# Patient Record
Sex: MALE | Race: WHITE | NOT HISPANIC OR LATINO | Employment: OTHER | URBAN - METROPOLITAN AREA
[De-identification: names, ages, dates, MRNs, and addresses within clinical notes are randomized per-mention and may not be internally consistent; named-entity substitution may affect disease eponyms.]

---

## 2017-01-03 ENCOUNTER — ALLSCRIPTS OFFICE VISIT (OUTPATIENT)
Dept: OTHER | Facility: OTHER | Age: 82
End: 2017-01-03

## 2017-04-02 ENCOUNTER — APPOINTMENT (EMERGENCY)
Dept: ULTRASOUND IMAGING | Facility: HOSPITAL | Age: 82
DRG: 728 | End: 2017-04-02
Payer: MEDICARE

## 2017-04-02 ENCOUNTER — HOSPITAL ENCOUNTER (INPATIENT)
Facility: HOSPITAL | Age: 82
LOS: 2 days | Discharge: HOME | DRG: 728 | End: 2017-04-04
Attending: EMERGENCY MEDICINE | Admitting: HOSPITALIST
Payer: MEDICARE

## 2017-04-02 ENCOUNTER — APPOINTMENT (EMERGENCY)
Dept: CT IMAGING | Facility: HOSPITAL | Age: 82
DRG: 728 | End: 2017-04-02
Payer: MEDICARE

## 2017-04-02 DIAGNOSIS — S32.040A COMPRESSION FRACTURE OF FOURTH LUMBAR VERTEBRA (HCC): ICD-10-CM

## 2017-04-02 DIAGNOSIS — N50.811 TESTICULAR PAIN, RIGHT: Primary | ICD-10-CM

## 2017-04-02 DIAGNOSIS — N49.2 CELLULITIS, SCROTUM: ICD-10-CM

## 2017-04-02 DIAGNOSIS — N43.3 HYDROCELE IN ADULT: ICD-10-CM

## 2017-04-02 DIAGNOSIS — N45.1 EPIDIDYMITIS: ICD-10-CM

## 2017-04-02 LAB
ANION GAP SERPL CALCULATED.3IONS-SCNC: 11 MMOL/L (ref 4–13)
BACTERIA UR QL AUTO: ABNORMAL /HPF
BASOPHILS # BLD AUTO: 0.02 THOUSANDS/ΜL (ref 0–0.1)
BASOPHILS NFR BLD AUTO: 0 % (ref 0–1)
BILIRUB UR QL STRIP: NEGATIVE
BUN SERPL-MCNC: 22 MG/DL (ref 5–25)
CALCIUM SERPL-MCNC: 8.5 MG/DL (ref 8.3–10.1)
CHLORIDE SERPL-SCNC: 103 MMOL/L (ref 100–108)
CLARITY UR: ABNORMAL
CLARITY, POC: CLEAR
CO2 SERPL-SCNC: 25 MMOL/L (ref 21–32)
COLOR UR: YELLOW
COLOR, POC: YELLOW
CREAT SERPL-MCNC: 1.34 MG/DL (ref 0.6–1.3)
EOSINOPHIL # BLD AUTO: 0.18 THOUSAND/ΜL (ref 0–0.61)
EOSINOPHIL NFR BLD AUTO: 2 % (ref 0–6)
ERYTHROCYTE [DISTWIDTH] IN BLOOD BY AUTOMATED COUNT: 14.7 % (ref 11.6–15.1)
EXT BILIRUBIN, UA: NEGATIVE
EXT BLOOD URINE: NORMAL
EXT GLUCOSE, UA: NEGATIVE
EXT KETONES: NEGATIVE
EXT NITRITE, UA: NEGATIVE
EXT PH, UA: 5
EXT PROTEIN, UA: NORMAL
EXT SPECIFIC GRAVITY, UA: 1
EXT UROBILINOGEN: NEGATIVE
GFR SERPL CREATININE-BSD FRML MDRD: 51.3 ML/MIN/1.73SQ M
GLUCOSE SERPL-MCNC: 136 MG/DL (ref 65–140)
GLUCOSE UR STRIP-MCNC: NEGATIVE MG/DL
HCT VFR BLD AUTO: 35.2 % (ref 36.5–49.3)
HGB BLD-MCNC: 11.4 G/DL (ref 12–17)
HGB UR QL STRIP.AUTO: ABNORMAL
KETONES UR STRIP-MCNC: NEGATIVE MG/DL
LEUKOCYTE ESTERASE UR QL STRIP: ABNORMAL
LYMPHOCYTES # BLD AUTO: 1.73 THOUSANDS/ΜL (ref 0.6–4.47)
LYMPHOCYTES NFR BLD AUTO: 21 % (ref 14–44)
MCH RBC QN AUTO: 28.9 PG (ref 26.8–34.3)
MCHC RBC AUTO-ENTMCNC: 32.4 G/DL (ref 31.4–37.4)
MCV RBC AUTO: 89 FL (ref 82–98)
MONOCYTES # BLD AUTO: 0.96 THOUSAND/ΜL (ref 0.17–1.22)
MONOCYTES NFR BLD AUTO: 12 % (ref 4–12)
NEUTROPHILS # BLD AUTO: 5.45 THOUSANDS/ΜL (ref 1.85–7.62)
NEUTS SEG NFR BLD AUTO: 65 % (ref 43–75)
NITRITE UR QL STRIP: NEGATIVE
NON-SQ EPI CELLS URNS QL MICRO: ABNORMAL /HPF
PH UR STRIP.AUTO: 5 [PH] (ref 4.5–8)
PLATELET # BLD AUTO: 177 THOUSANDS/UL (ref 149–390)
PMV BLD AUTO: 10.1 FL (ref 8.9–12.7)
POTASSIUM SERPL-SCNC: 3.9 MMOL/L (ref 3.5–5.3)
PROT UR STRIP-MCNC: NEGATIVE MG/DL
RBC # BLD AUTO: 3.95 MILLION/UL (ref 3.88–5.62)
RBC #/AREA URNS AUTO: ABNORMAL /HPF
SODIUM SERPL-SCNC: 139 MMOL/L (ref 136–145)
SP GR UR STRIP.AUTO: 1.01 (ref 1–1.03)
UROBILINOGEN UR QL STRIP.AUTO: 0.2 E.U./DL
WBC # BLD AUTO: 8.34 THOUSAND/UL (ref 4.31–10.16)
WBC # BLD EST: NEGATIVE 10*3/UL
WBC #/AREA URNS AUTO: ABNORMAL /HPF

## 2017-04-02 PROCEDURE — 76870 US EXAM SCROTUM: CPT

## 2017-04-02 PROCEDURE — 74177 CT ABD & PELVIS W/CONTRAST: CPT

## 2017-04-02 PROCEDURE — 81001 URINALYSIS AUTO W/SCOPE: CPT | Performed by: HOSPITALIST

## 2017-04-02 PROCEDURE — 36415 COLL VENOUS BLD VENIPUNCTURE: CPT | Performed by: EMERGENCY MEDICINE

## 2017-04-02 PROCEDURE — 87086 URINE CULTURE/COLONY COUNT: CPT | Performed by: HOSPITALIST

## 2017-04-02 PROCEDURE — 85025 COMPLETE CBC W/AUTO DIFF WBC: CPT | Performed by: EMERGENCY MEDICINE

## 2017-04-02 PROCEDURE — 81002 URINALYSIS NONAUTO W/O SCOPE: CPT | Performed by: EMERGENCY MEDICINE

## 2017-04-02 PROCEDURE — 99285 EMERGENCY DEPT VISIT HI MDM: CPT

## 2017-04-02 PROCEDURE — 80048 BASIC METABOLIC PNL TOTAL CA: CPT | Performed by: EMERGENCY MEDICINE

## 2017-04-02 PROCEDURE — 96374 THER/PROPH/DIAG INJ IV PUSH: CPT

## 2017-04-02 PROCEDURE — 87040 BLOOD CULTURE FOR BACTERIA: CPT | Performed by: EMERGENCY MEDICINE

## 2017-04-02 RX ORDER — WARFARIN SODIUM 6 MG/1
6 TABLET ORAL
Status: DISCONTINUED | OUTPATIENT
Start: 2017-04-03 | End: 2017-04-04 | Stop reason: HOSPADM

## 2017-04-02 RX ORDER — ATORVASTATIN CALCIUM 40 MG/1
40 TABLET, FILM COATED ORAL
Status: DISCONTINUED | OUTPATIENT
Start: 2017-04-02 | End: 2017-04-04 | Stop reason: HOSPADM

## 2017-04-02 RX ORDER — POTASSIUM CHLORIDE 20 MEQ/1
20 TABLET, EXTENDED RELEASE ORAL DAILY
Status: DISCONTINUED | OUTPATIENT
Start: 2017-04-02 | End: 2017-04-04 | Stop reason: HOSPADM

## 2017-04-02 RX ORDER — DOXYCYCLINE HYCLATE 100 MG/1
100 CAPSULE ORAL 2 TIMES DAILY
Qty: 20 CAPSULE | Refills: 0 | Status: SHIPPED | OUTPATIENT
Start: 2017-04-02 | End: 2017-04-04 | Stop reason: HOSPADM

## 2017-04-02 RX ORDER — WARFARIN SODIUM 4 MG/1
4 TABLET ORAL
Status: DISCONTINUED | OUTPATIENT
Start: 2017-04-05 | End: 2017-04-04 | Stop reason: HOSPADM

## 2017-04-02 RX ORDER — DOXYCYCLINE HYCLATE 100 MG/1
100 CAPSULE ORAL ONCE
Status: COMPLETED | OUTPATIENT
Start: 2017-04-02 | End: 2017-04-02

## 2017-04-02 RX ORDER — PANTOPRAZOLE SODIUM 20 MG/1
20 TABLET, DELAYED RELEASE ORAL
Status: DISCONTINUED | OUTPATIENT
Start: 2017-04-03 | End: 2017-04-04 | Stop reason: HOSPADM

## 2017-04-02 RX ORDER — FENTANYL CITRATE 50 UG/ML
100 INJECTION, SOLUTION INTRAMUSCULAR; INTRAVENOUS ONCE
Status: COMPLETED | OUTPATIENT
Start: 2017-04-02 | End: 2017-04-02

## 2017-04-02 RX ORDER — AMOXICILLIN 250 MG/1
250 CAPSULE ORAL 2 TIMES DAILY
COMMUNITY
End: 2017-04-04 | Stop reason: HOSPADM

## 2017-04-02 RX ORDER — WARFARIN SODIUM 6 MG/1
6 TABLET ORAL
Status: COMPLETED | OUTPATIENT
Start: 2017-04-02 | End: 2017-04-02

## 2017-04-02 RX ORDER — DOXYCYCLINE HYCLATE 100 MG/1
100 CAPSULE ORAL EVERY 12 HOURS SCHEDULED
Status: DISCONTINUED | OUTPATIENT
Start: 2017-04-02 | End: 2017-04-04 | Stop reason: HOSPADM

## 2017-04-02 RX ORDER — OXYCODONE HYDROCHLORIDE AND ACETAMINOPHEN 5; 325 MG/1; MG/1
1 TABLET ORAL EVERY 6 HOURS PRN
Qty: 30 TABLET | Refills: 0 | Status: SHIPPED | OUTPATIENT
Start: 2017-04-02 | End: 2017-04-04 | Stop reason: HOSPADM

## 2017-04-02 RX ORDER — OXYCODONE HYDROCHLORIDE 5 MG/1
5 TABLET ORAL EVERY 6 HOURS PRN
Status: DISCONTINUED | OUTPATIENT
Start: 2017-04-02 | End: 2017-04-04 | Stop reason: HOSPADM

## 2017-04-02 RX ORDER — HYDROCHLOROTHIAZIDE 12.5 MG/1
12.5 TABLET ORAL DAILY
Status: DISCONTINUED | OUTPATIENT
Start: 2017-04-02 | End: 2017-04-04 | Stop reason: HOSPADM

## 2017-04-02 RX ORDER — LEVOFLOXACIN 500 MG/1
500 TABLET, FILM COATED ORAL DAILY
Qty: 10 TABLET | Refills: 0 | Status: SHIPPED | OUTPATIENT
Start: 2017-04-02 | End: 2017-04-02 | Stop reason: ALTCHOICE

## 2017-04-02 RX ORDER — ACETAMINOPHEN 325 MG/1
650 TABLET ORAL EVERY 6 HOURS PRN
Status: DISCONTINUED | OUTPATIENT
Start: 2017-04-02 | End: 2017-04-02 | Stop reason: SDUPTHER

## 2017-04-02 RX ORDER — OXYCODONE HYDROCHLORIDE 10 MG/1
10 TABLET ORAL EVERY 6 HOURS PRN
Status: DISCONTINUED | OUTPATIENT
Start: 2017-04-02 | End: 2017-04-04 | Stop reason: HOSPADM

## 2017-04-02 RX ORDER — FENTANYL CITRATE 50 UG/ML
INJECTION, SOLUTION INTRAMUSCULAR; INTRAVENOUS
Status: COMPLETED
Start: 2017-04-02 | End: 2017-04-02

## 2017-04-02 RX ORDER — TORSEMIDE 20 MG/1
20 TABLET ORAL DAILY
Status: DISCONTINUED | OUTPATIENT
Start: 2017-04-02 | End: 2017-04-04 | Stop reason: HOSPADM

## 2017-04-02 RX ORDER — SOTALOL HYDROCHLORIDE 80 MG/1
80 TABLET ORAL EVERY 12 HOURS SCHEDULED
Status: DISCONTINUED | OUTPATIENT
Start: 2017-04-02 | End: 2017-04-04 | Stop reason: HOSPADM

## 2017-04-02 RX ORDER — FENTANYL CITRATE 50 UG/ML
50 INJECTION, SOLUTION INTRAMUSCULAR; INTRAVENOUS ONCE
Status: COMPLETED | OUTPATIENT
Start: 2017-04-02 | End: 2017-04-02

## 2017-04-02 RX ORDER — ACETAMINOPHEN 325 MG/1
650 TABLET ORAL EVERY 6 HOURS PRN
Status: DISCONTINUED | OUTPATIENT
Start: 2017-04-02 | End: 2017-04-04 | Stop reason: HOSPADM

## 2017-04-02 RX ORDER — VALSARTAN 160 MG/1
160 TABLET ORAL DAILY
Status: DISCONTINUED | OUTPATIENT
Start: 2017-04-02 | End: 2017-04-04 | Stop reason: HOSPADM

## 2017-04-02 RX ADMIN — IOHEXOL 100 ML: 350 INJECTION, SOLUTION INTRAVENOUS at 06:09

## 2017-04-02 RX ADMIN — CEFAZOLIN SODIUM 1000 MG: 1 SOLUTION INTRAVENOUS at 09:21

## 2017-04-02 RX ADMIN — HYDROCHLOROTHIAZIDE 12.5 MG: 12.5 TABLET ORAL at 17:14

## 2017-04-02 RX ADMIN — OXYCODONE HYDROCHLORIDE 5 MG: 5 TABLET ORAL at 17:36

## 2017-04-02 RX ADMIN — CEFAZOLIN SODIUM 1000 MG: 1 SOLUTION INTRAVENOUS at 17:19

## 2017-04-02 RX ADMIN — FENTANYL CITRATE 50 MCG: 50 INJECTION, SOLUTION INTRAMUSCULAR; INTRAVENOUS at 15:20

## 2017-04-02 RX ADMIN — POTASSIUM CHLORIDE 20 MEQ: 1500 TABLET, EXTENDED RELEASE ORAL at 17:14

## 2017-04-02 RX ADMIN — ATORVASTATIN CALCIUM 40 MG: 40 TABLET, FILM COATED ORAL at 17:15

## 2017-04-02 RX ADMIN — FENTANYL CITRATE 50 MCG: 50 INJECTION INTRAMUSCULAR; INTRAVENOUS at 15:20

## 2017-04-02 RX ADMIN — WARFARIN SODIUM 6 MG: 6 TABLET ORAL at 17:15

## 2017-04-02 RX ADMIN — FENTANYL CITRATE 100 MCG: 50 INJECTION, SOLUTION INTRAMUSCULAR; INTRAVENOUS at 05:42

## 2017-04-02 RX ADMIN — TORSEMIDE 20 MG: 20 TABLET ORAL at 17:15

## 2017-04-02 RX ADMIN — SOTALOL HYDROCHLORIDE 80 MG: 80 TABLET ORAL at 17:15

## 2017-04-02 RX ADMIN — VALSARTAN 160 MG: 160 TABLET, FILM COATED ORAL at 17:15

## 2017-04-02 RX ADMIN — DOXYCYCLINE HYCLATE 100 MG: 100 CAPSULE, GELATIN COATED ORAL at 09:23

## 2017-04-02 RX ADMIN — DOXYCYCLINE HYCLATE 100 MG: 100 CAPSULE, GELATIN COATED ORAL at 21:23

## 2017-04-03 LAB
ALBUMIN SERPL BCP-MCNC: 3.3 G/DL (ref 3.5–5)
ALP SERPL-CCNC: 58 U/L (ref 46–116)
ALT SERPL W P-5'-P-CCNC: 20 U/L (ref 12–78)
ANION GAP SERPL CALCULATED.3IONS-SCNC: 12 MMOL/L (ref 4–13)
AST SERPL W P-5'-P-CCNC: 19 U/L (ref 5–45)
BACTERIA UR CULT: NORMAL
BILIRUB SERPL-MCNC: 1.2 MG/DL (ref 0.2–1)
BUN SERPL-MCNC: 23 MG/DL (ref 5–25)
CALCIUM SERPL-MCNC: 8.4 MG/DL (ref 8.3–10.1)
CHLORIDE SERPL-SCNC: 103 MMOL/L (ref 100–108)
CO2 SERPL-SCNC: 25 MMOL/L (ref 21–32)
CREAT SERPL-MCNC: 1.37 MG/DL (ref 0.6–1.3)
ERYTHROCYTE [DISTWIDTH] IN BLOOD BY AUTOMATED COUNT: 14.9 % (ref 11.6–15.1)
GFR SERPL CREATININE-BSD FRML MDRD: 50 ML/MIN/1.73SQ M
GLUCOSE SERPL-MCNC: 119 MG/DL (ref 65–140)
HCT VFR BLD AUTO: 33.7 % (ref 36.5–49.3)
HGB BLD-MCNC: 10.5 G/DL (ref 12–17)
INR PPP: 2.88 (ref 0.86–1.16)
MAGNESIUM SERPL-MCNC: 2 MG/DL (ref 1.6–2.6)
MCH RBC QN AUTO: 28.1 PG (ref 26.8–34.3)
MCHC RBC AUTO-ENTMCNC: 31.2 G/DL (ref 31.4–37.4)
MCV RBC AUTO: 90 FL (ref 82–98)
PLATELET # BLD AUTO: 199 THOUSANDS/UL (ref 149–390)
PMV BLD AUTO: 10.6 FL (ref 8.9–12.7)
POTASSIUM SERPL-SCNC: 4 MMOL/L (ref 3.5–5.3)
PROT SERPL-MCNC: 7 G/DL (ref 6.4–8.2)
PROTHROMBIN TIME: 29 SECONDS (ref 12–14.3)
RBC # BLD AUTO: 3.74 MILLION/UL (ref 3.88–5.62)
SODIUM SERPL-SCNC: 140 MMOL/L (ref 136–145)
WBC # BLD AUTO: 6.56 THOUSAND/UL (ref 4.31–10.16)

## 2017-04-03 PROCEDURE — 80053 COMPREHEN METABOLIC PANEL: CPT | Performed by: HOSPITALIST

## 2017-04-03 PROCEDURE — 85027 COMPLETE CBC AUTOMATED: CPT | Performed by: HOSPITALIST

## 2017-04-03 PROCEDURE — 85610 PROTHROMBIN TIME: CPT | Performed by: HOSPITALIST

## 2017-04-03 PROCEDURE — 83735 ASSAY OF MAGNESIUM: CPT | Performed by: HOSPITALIST

## 2017-04-03 RX ADMIN — HYDROCHLOROTHIAZIDE 12.5 MG: 12.5 TABLET ORAL at 09:54

## 2017-04-03 RX ADMIN — DOXYCYCLINE HYCLATE 100 MG: 100 CAPSULE, GELATIN COATED ORAL at 21:11

## 2017-04-03 RX ADMIN — CEFAZOLIN SODIUM 1000 MG: 1 SOLUTION INTRAVENOUS at 09:56

## 2017-04-03 RX ADMIN — CEFAZOLIN SODIUM 1000 MG: 1 SOLUTION INTRAVENOUS at 00:53

## 2017-04-03 RX ADMIN — SOTALOL HYDROCHLORIDE 80 MG: 80 TABLET ORAL at 09:54

## 2017-04-03 RX ADMIN — VALSARTAN 160 MG: 160 TABLET, FILM COATED ORAL at 09:55

## 2017-04-03 RX ADMIN — OXYCODONE HYDROCHLORIDE 10 MG: 10 TABLET ORAL at 16:20

## 2017-04-03 RX ADMIN — METFORMIN HYDROCHLORIDE 1000 MG: 500 TABLET, FILM COATED ORAL at 09:54

## 2017-04-03 RX ADMIN — WARFARIN SODIUM 6 MG: 6 TABLET ORAL at 16:25

## 2017-04-03 RX ADMIN — ATORVASTATIN CALCIUM 40 MG: 40 TABLET, FILM COATED ORAL at 16:19

## 2017-04-03 RX ADMIN — DOXYCYCLINE HYCLATE 100 MG: 100 CAPSULE, GELATIN COATED ORAL at 09:53

## 2017-04-03 RX ADMIN — TORSEMIDE 20 MG: 20 TABLET ORAL at 09:55

## 2017-04-03 RX ADMIN — CEFAZOLIN SODIUM 1000 MG: 1 SOLUTION INTRAVENOUS at 16:27

## 2017-04-03 RX ADMIN — SOTALOL HYDROCHLORIDE 80 MG: 80 TABLET ORAL at 21:11

## 2017-04-03 RX ADMIN — PANTOPRAZOLE SODIUM 20 MG: 20 TABLET, DELAYED RELEASE ORAL at 05:33

## 2017-04-03 RX ADMIN — POTASSIUM CHLORIDE 20 MEQ: 1500 TABLET, EXTENDED RELEASE ORAL at 09:55

## 2017-04-04 VITALS
TEMPERATURE: 98 F | SYSTOLIC BLOOD PRESSURE: 140 MMHG | RESPIRATION RATE: 20 BRPM | HEIGHT: 73 IN | DIASTOLIC BLOOD PRESSURE: 67 MMHG | BODY MASS INDEX: 41.75 KG/M2 | WEIGHT: 315 LBS | HEART RATE: 63 BPM | OXYGEN SATURATION: 95 %

## 2017-04-04 PROBLEM — E11.29 TYPE 2 DIABETES MELLITUS WITH RENAL COMPLICATION (HCC): Status: ACTIVE | Noted: 2017-04-04

## 2017-04-04 PROBLEM — Z79.01 CHRONIC ANTICOAGULATION: Status: ACTIVE | Noted: 2017-04-04

## 2017-04-04 PROBLEM — D64.9 ANEMIA: Status: ACTIVE | Noted: 2017-04-04

## 2017-04-04 PROBLEM — N43.3 HYDROCELE: Status: ACTIVE | Noted: 2017-04-04

## 2017-04-04 PROBLEM — E66.01 MORBID OBESITY (HCC): Status: ACTIVE | Noted: 2017-04-04

## 2017-04-04 LAB
BASOPHILS # BLD AUTO: 0.02 THOUSANDS/ΜL (ref 0–0.1)
BASOPHILS NFR BLD AUTO: 0 % (ref 0–1)
EOSINOPHIL # BLD AUTO: 0.4 THOUSAND/ΜL (ref 0–0.61)
EOSINOPHIL NFR BLD AUTO: 7 % (ref 0–6)
ERYTHROCYTE [DISTWIDTH] IN BLOOD BY AUTOMATED COUNT: 14.7 % (ref 11.6–15.1)
GLUCOSE SERPL-MCNC: 131 MG/DL (ref 65–140)
HCT VFR BLD AUTO: 33.8 % (ref 36.5–49.3)
HGB BLD-MCNC: 10.8 G/DL (ref 12–17)
LYMPHOCYTES # BLD AUTO: 1.55 THOUSANDS/ΜL (ref 0.6–4.47)
LYMPHOCYTES NFR BLD AUTO: 28 % (ref 14–44)
MCH RBC QN AUTO: 28.5 PG (ref 26.8–34.3)
MCHC RBC AUTO-ENTMCNC: 32 G/DL (ref 31.4–37.4)
MCV RBC AUTO: 89 FL (ref 82–98)
MONOCYTES # BLD AUTO: 0.83 THOUSAND/ΜL (ref 0.17–1.22)
MONOCYTES NFR BLD AUTO: 15 % (ref 4–12)
NEUTROPHILS # BLD AUTO: 2.67 THOUSANDS/ΜL (ref 1.85–7.62)
NEUTS SEG NFR BLD AUTO: 50 % (ref 43–75)
PLATELET # BLD AUTO: 208 THOUSANDS/UL (ref 149–390)
PMV BLD AUTO: 10.2 FL (ref 8.9–12.7)
RBC # BLD AUTO: 3.79 MILLION/UL (ref 3.88–5.62)
WBC # BLD AUTO: 5.47 THOUSAND/UL (ref 4.31–10.16)

## 2017-04-04 PROCEDURE — 82948 REAGENT STRIP/BLOOD GLUCOSE: CPT

## 2017-04-04 PROCEDURE — 85025 COMPLETE CBC W/AUTO DIFF WBC: CPT | Performed by: HOSPITALIST

## 2017-04-04 RX ORDER — CEPHALEXIN 500 MG/1
500 CAPSULE ORAL 2 TIMES DAILY
Qty: 22 CAPSULE | Refills: 0 | Status: SHIPPED | OUTPATIENT
Start: 2017-04-04 | End: 2017-04-04 | Stop reason: HOSPADM

## 2017-04-04 RX ORDER — DOXYCYCLINE HYCLATE 100 MG/1
100 CAPSULE ORAL EVERY 12 HOURS SCHEDULED
Qty: 22 CAPSULE | Refills: 0 | Status: SHIPPED | OUTPATIENT
Start: 2017-04-04 | End: 2017-04-15

## 2017-04-04 RX ADMIN — CEFAZOLIN SODIUM 1000 MG: 1 SOLUTION INTRAVENOUS at 01:50

## 2017-04-04 RX ADMIN — DOXYCYCLINE HYCLATE 100 MG: 100 CAPSULE, GELATIN COATED ORAL at 08:10

## 2017-04-04 RX ADMIN — POTASSIUM CHLORIDE 20 MEQ: 1500 TABLET, EXTENDED RELEASE ORAL at 08:10

## 2017-04-04 RX ADMIN — TORSEMIDE 20 MG: 20 TABLET ORAL at 08:10

## 2017-04-04 RX ADMIN — VALSARTAN 160 MG: 160 TABLET, FILM COATED ORAL at 08:10

## 2017-04-04 RX ADMIN — HYDROCHLOROTHIAZIDE 12.5 MG: 12.5 TABLET ORAL at 08:12

## 2017-04-04 RX ADMIN — PANTOPRAZOLE SODIUM 20 MG: 20 TABLET, DELAYED RELEASE ORAL at 05:38

## 2017-04-04 RX ADMIN — METFORMIN HYDROCHLORIDE 1000 MG: 500 TABLET, FILM COATED ORAL at 08:10

## 2017-04-04 RX ADMIN — CEFAZOLIN SODIUM 1000 MG: 1 SOLUTION INTRAVENOUS at 08:13

## 2017-04-04 RX ADMIN — SOTALOL HYDROCHLORIDE 80 MG: 80 TABLET ORAL at 08:10

## 2017-04-05 ENCOUNTER — TRANSCRIBE ORDERS (OUTPATIENT)
Dept: ADMINISTRATIVE | Facility: HOSPITAL | Age: 82
End: 2017-04-05

## 2017-04-05 DIAGNOSIS — N28.1 ACQUIRED CYST OF KIDNEY: Primary | ICD-10-CM

## 2017-04-07 LAB
BACTERIA BLD CULT: NORMAL
BACTERIA BLD CULT: NORMAL

## 2017-04-08 ENCOUNTER — HOSPITAL ENCOUNTER (OUTPATIENT)
Dept: ULTRASOUND IMAGING | Facility: HOSPITAL | Age: 82
Discharge: HOME | End: 2017-04-08
Payer: MEDICARE

## 2017-04-08 DIAGNOSIS — N28.1 ACQUIRED CYST OF KIDNEY: ICD-10-CM

## 2017-04-08 PROCEDURE — 51798 US URINE CAPACITY MEASURE: CPT

## 2017-04-17 DIAGNOSIS — N40.0 ENLARGED PROSTATE WITHOUT LOWER URINARY TRACT SYMPTOMS (LUTS): ICD-10-CM

## 2017-04-17 DIAGNOSIS — I48.0 PAROXYSMAL ATRIAL FIBRILLATION (HCC): ICD-10-CM

## 2017-04-17 DIAGNOSIS — N39.0 URINARY TRACT INFECTION: ICD-10-CM

## 2017-04-19 ENCOUNTER — ALLSCRIPTS OFFICE VISIT (OUTPATIENT)
Dept: OTHER | Facility: OTHER | Age: 82
End: 2017-04-19

## 2017-04-20 ENCOUNTER — ALLSCRIPTS OFFICE VISIT (OUTPATIENT)
Dept: OTHER | Facility: OTHER | Age: 82
End: 2017-04-20

## 2017-04-20 ENCOUNTER — APPOINTMENT (OUTPATIENT)
Dept: LAB | Facility: HOSPITAL | Age: 82
End: 2017-04-20
Attending: UROLOGY
Payer: MEDICARE

## 2017-04-20 DIAGNOSIS — N40.0 ENLARGED PROSTATE WITHOUT LOWER URINARY TRACT SYMPTOMS (LUTS): ICD-10-CM

## 2017-04-20 LAB
BILIRUB UR QL STRIP: NORMAL
CLARITY UR: NORMAL
COLOR UR: YELLOW
GLUCOSE (HISTORICAL): NORMAL
HGB UR QL STRIP.AUTO: NORMAL
KETONES UR STRIP-MCNC: NORMAL MG/DL
LEUKOCYTE ESTERASE UR QL STRIP: NORMAL
NITRITE UR QL STRIP: NORMAL
PH UR STRIP.AUTO: 6 [PH]
PROT UR STRIP-MCNC: NORMAL MG/DL
SP GR UR STRIP.AUTO: 1.01
UROBILINOGEN UR QL STRIP.AUTO: NORMAL

## 2017-04-20 PROCEDURE — 87086 URINE CULTURE/COLONY COUNT: CPT

## 2017-04-21 LAB — BACTERIA UR CULT: NORMAL

## 2017-04-24 ENCOUNTER — ALLSCRIPTS OFFICE VISIT (OUTPATIENT)
Dept: OTHER | Facility: OTHER | Age: 82
End: 2017-04-24

## 2017-04-26 LAB
LEFT EYE DIABETIC RETINOPATHY: NORMAL
RIGHT EYE DIABETIC RETINOPATHY: NORMAL

## 2017-05-18 ENCOUNTER — HOSPITAL ENCOUNTER (OUTPATIENT)
Dept: NON INVASIVE DIAGNOSTICS | Facility: CLINIC | Age: 82
Discharge: HOME | End: 2017-05-18
Payer: MEDICARE

## 2017-05-18 DIAGNOSIS — I48.0 PAROXYSMAL ATRIAL FIBRILLATION (HCC): ICD-10-CM

## 2017-05-18 PROCEDURE — 93306 TTE W/DOPPLER COMPLETE: CPT

## 2017-05-19 ENCOUNTER — APPOINTMENT (OUTPATIENT)
Dept: LAB | Age: 82
End: 2017-05-19
Payer: MEDICARE

## 2017-05-19 ENCOUNTER — TRANSCRIBE ORDERS (OUTPATIENT)
Dept: ADMINISTRATIVE | Age: 82
End: 2017-05-19

## 2017-05-19 DIAGNOSIS — R31.0 GROSS HEMATURIA: ICD-10-CM

## 2017-05-19 PROCEDURE — 87086 URINE CULTURE/COLONY COUNT: CPT

## 2017-05-19 PROCEDURE — 87077 CULTURE AEROBIC IDENTIFY: CPT

## 2017-05-19 PROCEDURE — 87186 SC STD MICRODIL/AGAR DIL: CPT

## 2017-05-21 LAB — BACTERIA UR CULT: NORMAL

## 2017-06-02 ENCOUNTER — ALLSCRIPTS OFFICE VISIT (OUTPATIENT)
Dept: OTHER | Facility: OTHER | Age: 82
End: 2017-06-02

## 2017-06-23 DIAGNOSIS — N39.0 URINARY TRACT INFECTION: ICD-10-CM

## 2017-06-24 ENCOUNTER — LAB CONVERSION - ENCOUNTER (OUTPATIENT)
Dept: OTHER | Facility: OTHER | Age: 82
End: 2017-06-24

## 2017-06-24 LAB
BILIRUB UR QL STRIP: NEGATIVE
COLOR UR: NORMAL
COMMENT (HISTORICAL): CLEAR
CULTURE RESULT (HISTORICAL): NORMAL
FECAL OCCULT BLOOD DIAGNOSTIC (HISTORICAL): NEGATIVE
GLUCOSE (HISTORICAL): NEGATIVE
KETONES UR STRIP-MCNC: NEGATIVE MG/DL
LEUKOCYTE ESTERASE UR QL STRIP: NEGATIVE
NITRITE UR QL STRIP: NEGATIVE
PH UR STRIP.AUTO: 5.5 [PH] (ref 5–8)
PROT UR STRIP-MCNC: NEGATIVE MG/DL
SP GR UR STRIP.AUTO: 1.02 (ref 1–1.03)

## 2017-07-06 ENCOUNTER — ALLSCRIPTS OFFICE VISIT (OUTPATIENT)
Dept: OTHER | Facility: OTHER | Age: 82
End: 2017-07-06

## 2017-07-06 LAB
CLARITY UR: NORMAL
COLOR UR: YELLOW
GLUCOSE (HISTORICAL): NORMAL
HGB UR QL STRIP.AUTO: NORMAL
KETONES UR STRIP-MCNC: NORMAL MG/DL
LEUKOCYTE ESTERASE UR QL STRIP: NORMAL
NITRITE UR QL STRIP: NORMAL
PH UR STRIP.AUTO: 6 [PH]
PROT UR STRIP-MCNC: NORMAL MG/DL
SP GR UR STRIP.AUTO: 1.02

## 2017-10-09 ENCOUNTER — ALLSCRIPTS OFFICE VISIT (OUTPATIENT)
Dept: OTHER | Facility: OTHER | Age: 82
End: 2017-10-09

## 2017-10-18 ENCOUNTER — ALLSCRIPTS OFFICE VISIT (OUTPATIENT)
Dept: OTHER | Facility: OTHER | Age: 82
End: 2017-10-18

## 2017-10-19 NOTE — PROGRESS NOTES
Assessment  Assessed    1  Aortic Valve Replacement   2  Diastolic congestive heart failure (428 30,428 0) (I50 30)   3  Dyslipidemia (272 4) (E78 5)   4  Edema (782 3) (R60 9)    Plan  Diastolic congestive heart failure, Edema, Hypertension, ICD (implantable  cardioverter-defibrillator) in place    · Torsemide 20 MG Oral Tablet (Demadex); Take 1 tablet twice daily   Rx By: Urbano Perkins; Dispense: 90 Days ; #:180 Tablet; Refill: 3;For: Diastolic congestive heart failure, Edema, Hypertension, ICD (implantable cardioverter-defibrillator) in place; ROBERT = N; Sent To: [place] [id number]  Edema    · Follow-up visit in 2 months Evaluation and Treatment  Follow-up  Status: Hold For -  Scheduling  Requested for: [id number]   Ordered; For: Edema; Ordered By: Urbano Perkins Performed:  Due: 58XUT8765  Paroxysmal atrial fibrillation    · EKG/ECG- POC; Status:Complete;   Done: 05MPB8631   Perform: In Office; Due:11Xdi0136; Last Updated By:Dontae Richmond; 10/18/2017 4:13:31 PM;Ordered; For:Paroxysmal atrial fibrillation; Ordered By:Miguel Angel Cutler; Discussion/Summary  Cardiology Discussion Summary Free Text Note Form [facility]:   81year old man with morbid obesity, hypertension, recent aortic valve replacement for endocarditis who returns for follow up  Has an increase in dyspnea and edema since last visit  Echocardiogram demonstrated normal left ventricular systolic function with normal AVR  s/p aortic valve replacement for endocarditis - doing well  Hypertension - elevated  s/p ICD for ventricular tachycardiaDVT - on warfarinParoxysmal atrial fibrillation - on Sotalol  In normal sinus rhythm  Progressive dyspnea/edema - appears to be somewhat volume overloaded  Increase Demadex to 20mg 2x/day at noon and 4 PM Continue remainder of medications  Follow up in 2 months  Follow up in 6 months  Chief Complaint  Chief Complaint Free Text Note Form: Pt here for a 6 month f/u   Pt complains of sob and swelling of his right leg    Chief Complaint Chronic Condition [facility]: Patient is here today for follow up of chronic conditions described in HPI  History of Present Illness  Cardiology HPI Free Text Note Form [facility]: Mr Jenna Joshi is an 81year old man with morbid obesity, hypertension, recent aortic valve replacement for endocarditis who returns for follow up  Has an increase in dyspnea and edema since last visit  Echocardiogram demonstrated normal left ventricular systolic function with normal AVR  Review of Systems  Cardiology Male ROS:     Cardiac: has swelling in the     Skin: No complaints of nonhealing sores or skin rash  Genitourinary: No complaints of recurrent urinary tract infections, frequent urination at night, difficult urination, blood in urine, kidney stones, loss of bladder control, no kidney or prostate problems, no erectile dysfunction  Psychological: No complaints of feeling depressed, anxiety, panic attacks, or difficulty concentrating  General: lack of energy/fatigue  Respiratory: No complaints of shortness of breath, cough with sputum, or wheezing  HEENT: No complaints of serious problems, hearing problems, nose problems, throat problems, or snoring  Gastrointestinal: No complaints of liver problems, nausea, vomiting, heartburn, constipation, bloody stools, diarrhea, problems swallowing, adbominal pain, or rectal bleeding  Hematologic: bleeding disorders   Neurological: No complaints of numbness, tingling, dizziness, weakness, seizures, headaches, syncope or fainting, AM fatigue, daytime sleepiness, no witnessed apnea episodes  Musculoskeletal: No complaints of arthritis, back pain, or painfull swelling  Active Problems  Problems    1  Aortic stenosis (424 1) (I35 0)   2  Aortic Valve Replacement   3  BPH (benign prostatic hyperplasia) (600 00) (N40 0)   4  BPH (benign prostatic hyperplasia) (600 00) (N40 0)   5  Cardiomegaly (429 3) (I51 7)   6   Chronic bilateral low back pain without sciatica (724 2,338 29) (M54 5,G89 29)   7  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 (459 31) (I87 311)   8  Compression fracture of L4 lumbar vertebra (805 4) (S32 040A)   9  Diabetes Mellitus (250 00)   10  Diastolic congestive heart failure (428 30,428 0) (I50 30)   11  Dyslipidemia (272 4) (E78 5)   12  Edema (782 3) (R60 9)   13  Endocarditis, valve unspecified (424 90) (I38)   14  Epididymitis (604 90) (N45 1)   15  Hematuria, gross (599 71) (R31 0)   16  Hydrocele (603 9) (N43 3)   17  Hypertension (401 9) (I10)   18  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)   19  Left knee pain (719 46) (M25 562)   20  Obesity, unspecified obesity severity, unspecified obesity type (278 00) (E66 9)   21  Osteoarthritis (715 90) (M19 90)   22  Paroxysmal atrial fibrillation (427 31) (I48 0)   23  Pseudogout (275 49)   24  Renal cyst (753 10) (N28 1)   25  Urinary tract infection, acute (599 0) (N39 0)   26  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)   27  Venous insufficiency (chronic) (peripheral) (459 81) (I87 2)   28  Ventricular tachycardia (427 1) (I47 2)    Past Medical History  Problems    1  History of Acute deep vein thrombosis of lower limb, unspecified laterality   2  History of Diabetes Mellitus (250 00)   3  History of Dyslipidemia (272 4) (E78 5)   4  History of hypertension (V12 59) (Z86 79)   5  History of Morbid or severe obesity due to excess calories (278 01) (E66 01)   6  History of Open wound of foot (892 0) (S91 309A)   7  History of Pulmonary Embolism (V12 55)  Active Problems And Past Medical History Reviewed: The active problems and past medical history were reviewed and updated today  Surgical History  Problems    1  History of Aortic Valve Replacement   2  Aortic Valve Replacement   3  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   4  History of Varicose Vein Ligation  Surgical History Reviewed:    The surgical history was reviewed and updated today  Family History  Daughter    1  Family history of Arthritis  Family History    2  Family history of Arthritis  Family History Reviewed: The family history was reviewed and updated today  Social History  Problems    · Denied: History of Alcohol Use (History)   · Marital History - Currently    · Never A Smoker   · No drug use  Social History Reviewed: The social history was reviewed and updated today  The social history was reviewed and is unchanged  Current Meds   1  Atorvastatin Calcium 40 MG Oral Tablet; Take 1 tablet daily; Therapy: (Recorded:16Apr2015) to Recorded   2  Demadex 20 MG Oral Tablet; TAKE 1 TABLET ONCE DAILY; Therapy: (Recorded:24Apr2014) to Recorded   3  Diovan -12 5 MG Oral Tablet; TAKE 1 TABLET ONCE DAILY; Therapy: 15Apr2014 to Recorded   4  Lidocaine HCl (Local Anesth ) 4 % SOLN; Apply prn to wound(s) prior to debridment for   pain control; Therapy: (Dayne Winter) to Recorded   5  Meloxicam 15 MG Oral Tablet; Therapy: (Recorded:93Bym6695) to Recorded   6  MetFORMIN HCl ER (MOD) 1000 MG Oral Tablet Extended Release 24 Hour; Take one   tablet daily; Therapy: (Recorded:09Apr2014) to Recorded   7  Multivitamins TABS; TAKE 1 TABLET DAILY; Therapy: (Recorded:20Apr2016) to Recorded   8  Nitrofurantoin Monohyd Macro 100 MG Oral Capsule; TAKE 1 CAPSULE EVERY 12   HOURS DAILY; Therapy: 09LDO8384 to (Evaluate:01Jun2017)  Requested for: 09UUP8410; Last   Rx:[id number] Ordered   9  Omeprazole 20 MG Oral Tablet Delayed Release; Take 1 tablet daily; Therapy: (Recorded:19Mar2014) to Recorded   10  Potassium Chloride Jesusita ER 20 MEQ Oral Tablet Extended Release; Take 1 tablet daily; Therapy: (Recorded:24Apr2014) to Recorded   11  Sotalol HCl - 80 MG Oral Tablet; TAKE 1 TABLET EVERY 12 HOURS DAILY; Therapy: (Recorded:09Apr2014) to Recorded   12  Tamsulosin HCl - 0 4 MG Oral Capsule;     Therapy: (Recorded:60Ula3243) to Recorded 13  Tylenol 325 MG Oral Tablet; Therapy: (Recorded:80Vex5178) to Recorded   14  Warfarin Sodium 7 5 MG Oral Tablet; TAKE 1 TABLET DAILY; Therapy: (Recorded:19Mar2014) to Recorded    Allergies  Medication    1  No Known Drug Allergies    Vitals  Vital Signs    Recorded: 08CHH8560 04:08PM   Heart Rate 62   Systolic 371, RUE, Sitting   Diastolic 84, RUE, Sitting   Height 6 ft 1 in   Weight 348 lb 7 oz   BMI Calculated 45 97   BSA Calculated 2 72     Physical Exam    Constitutional   General appearance: Abnormal  -- elderly obese male  Ears, Nose, Mouth, and Throat - External inspection of ears and nose: Normal without deformities or discharge  Neck   Neck and thyroid: Normal, supple, trachea midline, no thyromegaly  Pulmonary   Respiratory effort: No increased work of breathing or signs of respiratory distress  Auscultation of lungs: Clear to auscultation, no rales, no rhonchi, no wheezing, good air movement  Cardiovascular   Auscultation of heart: Abnormal   A grade 2 systolic murmur was heard at the RUSB  Examination of extremities for edema and/or varicosities: Abnormal   bilateral ankle 1+ pitting edema  Chest - Chest: Normal    Abdomen   Abdomen: Abnormal  -- obese  Musculoskeletal Gait and station: Normal gait  Skin - Skin and subcutaneous tissue: Normal without rashes or lesions  Skin is warm and well perfused, normal turgor  Neurologic - Speech: Normal     Psychiatric - Orientation to person, place, and time: Normal -- Mood and affect: Normal       Results/Data  ECG Report:   Rhythm and rate:  ventricular rate is 62 beats per minute  -- normal sinus rhythm  ME Interval: first degree heart block     QRS: left bundle branch block      Future Appointments    Date/Time Provider Specialty Site   07/10/2018 08:00 AM Cardiology, [address] [facility]   01/10/2018 08:00 AM Cardiology, Device Remote  [address]   01/11/2018 09:00 AM Corrinne Ogle, Colt Russo, [facility] Urology [facility]     Signatures   Electronically signed by : MAXIME Crowell ; Oct 18 2017  4:26PM EST                       (Author)

## 2017-12-05 ENCOUNTER — APPOINTMENT (OUTPATIENT)
Dept: WOUND CARE | Facility: SKILLED NURSING FACILITY | Age: 82
End: 2017-12-05
Payer: MEDICARE

## 2017-12-05 PROCEDURE — 11042 DBRDMT SUBQ TIS 1ST 20SQCM/<: CPT

## 2017-12-05 PROCEDURE — 99213 OFFICE O/P EST LOW 20 MIN: CPT

## 2017-12-14 ENCOUNTER — ALLSCRIPTS OFFICE VISIT (OUTPATIENT)
Dept: OTHER | Facility: OTHER | Age: 82
End: 2017-12-14

## 2017-12-15 NOTE — PROGRESS NOTES
Assessment  Assessed    1  Aortic Valve Replacement   2  Diastolic congestive heart failure (428 30,428 0) (I50 30)   3  Dyslipidemia (272 4) (E78 5)   4  Edema (782 3) (R60 9)   5  Hypertension (401 9) (I10)   6  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)    Plan  Hypertension    · Valsartan-Hydrochlorothiazide 320-25 MG Oral Tablet (Diovan HCT); TAKE 1TABLET BY MOUTH EVERY DAY   Rx By: Cherri Kimball; Dispense: 90 Days ; #:90 Tablet; Refill: 3;For: Hypertension; ROBERT = N; [facility]  [id number]  ICD (implantable cardioverter-defibrillator) in place    · Follow-up visit in 4 Months Evaluation and Treatment  Follow-up  Status: Hold For -Scheduling  Requested for: [id number]   Ordered; For: ICD (implantable cardioverter-defibrillator) in place; Ordered By: Cherri Kimball Performed:  Due: 04AYB0555    Discussion/Summary  Cardiology Discussion Summary Free Text Note Form [facility]:   81year old man with morbid obesity, hypertension, recent aortic valve replacement for endocarditis who returns for follow up  Dyspnea is stable  Echocardiogram demonstrated normal left ventricular systolic function with normal AVR  Takes Torsemide daily because urinates too much  Impression:1  s/p aortic valve replacement for endocarditis - doing well  2  Hypertension - elevated  3  s/p ICD for ventricular tachycardia4  DVT - on warfarin5  Paroxysmal atrial fibrillation - on Sotalol  In normal sinus rhythm  6  Progressive dyspnea/edema - appears to be somewhat volume overloaded  Recommendations:1  Change Diovan HCT to 320-25mg daily  2  Continue remainder of medications  3  Follow up in 4 months  Chief Complaint  Chief Complaint Free Text Note Form: patient at the office for 2 months follow up, patient complain of shortness of breath when walk and right leg swelling almost daily  Chief Complaint Chronic Condition [facility]: Patient is here today for follow up of chronic conditions described in HPI  History of Present Illness  Cardiology [facility] [place] Free Text Note Form [facility]: Mr  Lelia Barry is an 81year old man with morbid obesity, hypertension, recent aortic valve replacement for endocarditis who returns for follow up  Dyspnea is stable  Echocardiogram demonstrated normal left ventricular systolic function with normal AVR  Takes Torsemide daily because urinates too much  Review of Systems  Cardiology Male ROS:    Cardiac: has swelling in the    Skin: No complaints of nonhealing sores or skin rash  Genitourinary: No complaints of recurrent urinary tract infections, frequent urination at night, difficult urination, blood in urine, kidney stones, loss of bladder control, no kidney or prostate problems, no erectile dysfunction  Psychological: No complaints of feeling depressed, anxiety, panic attacks, or difficulty concentrating  General: lack of energy/fatigue  Respiratory: No complaints of shortness of breath, cough with sputum, or wheezing  HEENT: No complaints of serious problems, hearing problems, nose problems, throat problems, or snoring  Gastrointestinal: No complaints of liver problems, nausea, vomiting, heartburn, constipation, bloody stools, diarrhea, problems swallowing, adbominal pain, or rectal bleeding  Hematologic: bleeding disorders  Neurological: No complaints of numbness, tingling, dizziness, weakness, seizures, headaches, syncope or fainting, AM fatigue, daytime sleepiness, no witnessed apnea episodes  Musculoskeletal: No complaints of arthritis, back pain, or painfull swelling  Active Problems  Problems    1  Aortic stenosis (424 1) (I35 0)   2  Aortic Valve Replacement   3  BPH (benign prostatic hyperplasia) (600 00) (N40 0)   4  BPH (benign prostatic hyperplasia) (600 00) (N40 0)   5  Cardiomegaly (429 3) (I51 7)   6  Chronic bilateral low back pain without sciatica (724 2,338 29) (M54 5,G89 29)   7   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 (459 31) (I87 311)   8  Compression fracture of L4 lumbar vertebra (805 4) (S32 040A)   9  Diabetes Mellitus (250 00)   10  Diastolic congestive heart failure (428 30,428 0) (I50 30)   11  Dyslipidemia (272 4) (E78 5)   12  Edema (782 3) (R60 9)   13  Endocarditis, valve unspecified (424 90) (I38)   14  Epididymitis (604 90) (N45 1)   15  Hematuria, gross (599 71) (R31 0)   16  Hydrocele (603 9) (N43 3)   17  Hypertension (401 9) (I10)   18  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)   19  Left knee pain (719 46) (M25 562)   20  Obesity, unspecified obesity severity, unspecified obesity type (278 00) (E66 9)   21  Osteoarthritis (715 90) (M19 90)   22  Paroxysmal atrial fibrillation (427 31) (I48 0)   23  Pseudogout (275 49)   24  Renal cyst (753 10) (N28 1)   25  Urinary tract infection, acute (599 0) (N39 0)   26  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)   27  Venous insufficiency (chronic) (peripheral) (459 81) (I87 2)   28  Ventricular tachycardia (427 1) (I47 2)    Past Medical History  Problems    1  History of Acute deep vein thrombosis of lower limb, unspecified laterality   2  History of Diabetes Mellitus (250 00)   3  History of Dyslipidemia (272 4) (E78 5)   4  History of hypertension (V12 59) (Z86 79)   5  History of Morbid or severe obesity due to excess calories (278 01) (E66 01)   6  History of Open wound of foot (892 0) (S91 309A)   7  History of Pulmonary Embolism (V12 55)  Active Problems And Past Medical History Reviewed: The active problems and past medical history were reviewed and updated today  Surgical History  Problems    1  History of Aortic Valve Replacement   2  Aortic Valve Replacement   3  History of Interruption Inferior Vena Cava Worden Filter Placement   4  History of Varicose Vein Ligation  Surgical History Reviewed: The surgical history was reviewed and updated today  Family History  Daughter    1  Family history of Arthritis  Family History    2  Family history of Arthritis  Family History Reviewed: The family history was reviewed and updated today  Social History  Problems    · Denied: History of Alcohol Use (History)   · Marital History - Currently    · Never A Smoker   · No drug use  Social History Reviewed: The social history was reviewed and updated today  The social history was reviewed and is unchanged  Current Meds   1  Atorvastatin Calcium 40 MG Oral Tablet; Take 1 tablet daily; Therapy: (Recorded:16Apr2015) to Recorded   2  Diovan -12 5 MG Oral Tablet; TAKE 1 TABLET ONCE DAILY; Therapy: 15Apr2014 to Recorded   3  Lidocaine HCl (Local Anesth ) 4 % SOLN; Apply prn to wound(s) prior to debridment for pain control; Therapy: (Albert Khan) to Recorded   4  Meloxicam 15 MG Oral Tablet; Therapy: (Recorded:48Exn8812) to Recorded   5  MetFORMIN HCl ER (MOD) 1000 MG Oral Tablet Extended Release 24 Hour; Take one tablet daily; Therapy: (Recorded:09Apr2014) to Recorded   6  Multivitamins TABS; TAKE 1 TABLET DAILY; Therapy: (Recorded:20Apr2016) to Recorded   7  Nitrofurantoin Monohyd Macro 100 MG Oral Capsule; TAKE 1 CAPSULE EVERY 12 HOURS DAILY; Therapy: 95DTL3004 to (Evaluate:01Jun2017)  Requested for: 77SWX1931; Last Rx:72Qbz1491 Ordered   8  Omeprazole 20 MG Oral Tablet Delayed Release; Take 1 tablet daily; Therapy: (Recorded:19Mar2014) to Recorded   9  Potassium Chloride Jesusita ER 20 MEQ Oral Tablet Extended Release; Take 1 tablet daily; Therapy: (Recorded:24Apr2014) to Recorded   10  Sotalol HCl - 80 MG Oral Tablet; TAKE 1 TABLET EVERY 12 HOURS DAILY; Therapy: (Recorded:09Apr2014) to Recorded   11  Tamsulosin HCl - 0 4 MG Oral Capsule; Therapy: (Recorded:12Spb1386) to Recorded   12  Torsemide 20 MG Oral Tablet; Take 1 tablet twice daily  Requested for: 88WNX8681; Last  Rx:18Oct2017 Ordered   13  Tylenol 325 MG Oral Tablet; Therapy: (Recorded:17Oct2013) to Recorded   14   Warfarin Sodium 7 5 MG Oral Tablet; TAKE 1 TABLET DAILY; Therapy: (Recorded:19Mar2014) to Recorded  Medication List Reviewed: The medication list was reviewed and updated today  Allergies  Medication    1  No Known Drug Allergies    Vitals  Vital Signs    Recorded: 35JYN8586 04:17PM   Heart Rate 64   Systolic 019, RUE, Sitting   Diastolic 90, RUE, Sitting   Height 6 ft 1 in   Weight 352 lb    BMI Calculated 46 44   BSA Calculated 2 74   O2 Saturation 94     Physical Exam   Constitutional  General appearance: Abnormal  -- elderly obese male  Ears, Nose, Mouth, and Throat - External inspection of ears and nose: Normal without deformities or discharge  Neck  Neck and thyroid: Normal, supple, trachea midline, no thyromegaly  Pulmonary  Respiratory effort: No increased work of breathing or signs of respiratory distress  Auscultation of lungs: Clear to auscultation, no rales, no rhonchi, no wheezing, good air movement  Cardiovascular  Auscultation of heart: Abnormal   A grade 2 systolic murmur was heard at the RUSB  Examination of extremities for edema and/or varicosities: Abnormal   bilateral ankle 1+ pitting edema  Chest - Chest: Normal   Abdomen  Abdomen: Abnormal  -- obese  Musculoskeletal Gait and station: Normal gait  Skin - Skin and subcutaneous tissue: Normal without rashes or lesions  Skin is warm and well perfused, normal turgor  Neurologic - Speech: Normal    Psychiatric - Orientation to person, place, and time: Normal -- Mood and affect: Normal       Results/Data  ECG Report:  Rhythm and rate:  ventricular rate is 63 beats per minute  -- normal sinus rhythm  RI Interval: first degree heart block    QRS: left bundle branch block      Future Appointments    Date/Time Provider Specialty Site   07/10/2018 08:00 AM Cardiology, [address] [facility]   01/10/2018 08:00 AM Cardiology, Device Remote  [address]   01/11/2018 09:00 AM Mojgan Olson, [address] Urology [facility] FOR UROLOGY [place] [facility] Signatures   Electronically signed by : MAXIME Trent ; Dec 14 2017  4:33PM EST                       (Author)

## 2017-12-19 ENCOUNTER — APPOINTMENT (OUTPATIENT)
Dept: WOUND CARE | Facility: SKILLED NURSING FACILITY | Age: 82
End: 2017-12-19
Payer: MEDICARE

## 2017-12-19 PROCEDURE — 97597 DBRDMT OPN WND 1ST 20 CM/<: CPT | Performed by: REGISTERED NURSE

## 2018-01-02 ENCOUNTER — APPOINTMENT (OUTPATIENT)
Dept: WOUND CARE | Facility: SKILLED NURSING FACILITY | Age: 83
End: 2018-01-02
Payer: MEDICARE

## 2018-01-02 PROCEDURE — 99212 OFFICE O/P EST SF 10 MIN: CPT

## 2018-01-10 ENCOUNTER — GENERIC CONVERSION - ENCOUNTER (OUTPATIENT)
Dept: OTHER | Facility: OTHER | Age: 83
End: 2018-01-10

## 2018-01-10 ENCOUNTER — ALLSCRIPTS OFFICE VISIT (OUTPATIENT)
Dept: OTHER | Facility: OTHER | Age: 83
End: 2018-01-10

## 2018-01-10 NOTE — PROGRESS NOTES
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; Ordered By:Chung Mcintosh;    Wound Care Orders/Instructions    Wound Identification and Instructions   Wound #1: right medial ankle    Wound Care Instructions  Discussed with Patient/Caregiver  Dressing Type: Richie Bevel with mild soap and water, normal saline, wound cleanser or as specified  Apply specified dressing to wound base/bed  To periwound apply: Skin prep barrier  Secondary dressing apply: Gauze  Secure with: Kareem, and tape  Dressing change frequency: Daily  Comments/Other:   Puracol with Dermagran over top today only   Apply compression using: compression stockings set to 30 - 40 mm Hg  Wound Goals  Wound Goals:   Healing Goals:   Fair healing potential secondary to moderate comorbid conditions  Wound edges will appear with evidence of contraction and epithelialization   Patient will achieve full wound closure and edema management for wound prevention       Discussion/Summary    Right leg ulcer getting smaller  One dose of Puracol today then Dermagran  Chief Complaint  Follow up for right medial ankle wound      History of Present Illness    Wound Identification HPI   Wound #1: right medial ankle          The patient came to Wound Care and was ambulatory  The patient is being seen for a follow-up with MD and at [facility] at the [address] [place] [address]  The patient's identification was verified  A secondary verification process was completed  Orientation: oriented to person, oriented to place and oriented to time  Blood Glucose:  not instructed to check daily mg/dL as reported by patient  8/11/2015: Patient reports the wound reopened about 4 weeks ago  Patient has been using Maxorb to wound base  Venous with reflux and arterial Doppler ordered  8/18/2015: Patient arrived with lidocaine intact to right medial ankle  Patient is scheduled for his venous and arterial Doppler on Thursday  8/25/15: The patient arrived with dressing intact to wound  No issues since last visit  9/8/15: The patient arrived with lidocaine soaked gauze intact to wound  No issues since last visit  9/15/15: The patient arrived with lidocaine soaked gauze intact to wound  States that he received wound care supplies today  9/22/15: The patient arrived with lidocaine soaked gauze applied to wound  10/6/15: The patient arrived with lidocaine soaked gauze intact to wound  10/20/2015: Arrived with 4x4, kareem, tape intact to right medial ankle  11/3/2015: Arrived with 4x4,Kareem intact to right medial ankle  11/17/2015: Arrived with 4x4, remington intact to right lower extremity  12/1/15: The patient arrived with dressing intact to wound  12/15/2015: Arrived with Dermagran, 4x4, Kareem intact to right lower extremity  Arrived wearing compression stocking intact to bilateral lower extremities  12/29/15: Arrived with Dermagran, 4x4, Kareem intact to right medial ankle  Compression stocking  1/12/2016: Arrived with Dermagran, 4x4, Kareem intact to right lower extremity  1/26/16: Arrived with Dermagran, 4x4,Kareem intact, compression stocking intact to right lower extremity  History of Falling: No = 0   Secondary Diagnosis: Yes = 15   Ambulatory Aid None, Bedrest, Nurse Assist = 0   No = 0   Gait: Normal = 0   Mental Status: Oriented to own ability = 0   Total Score: 15   < 25 = Low Risk         The most recent fall occurred denies any recent falls     Nutrition Assessment Screening: Food intake over the last 3 months due to the loss of appetite, digestive problems, chewing or swallowing difficulties is graded as: 2 = no decrease in food intake   Weight loss during the last 3 months: 3 = no weight loss Mobility scored as: 2 = goes out  Psychological Stress and Acute Disease Scored as: 2 = The patient has not experienced psychological stress or acute disease in the last 3 months  Neuropsychological problems scored as: 2 = no psychological problems  Body Mass Index (BMI) scored as: 3 = BMI 23 or greater  Nutritional Assessment Screening Score: 12 - 14 points - Normal nutritional status  Pain Assessment   the patient states they do not have pain  (on a scale of 0 to 10, the patient rates the pain at 0 )   Abuse And Domestic Violence Screen    Yes, the patient is safe at home  The patient states no one is hurting them  Depression And Suicide Screen  No, the patient has not had thoughts of hurting themself  No, the patient has not felt depressed in the past 7 days  Prefered Language is  [place]  Primary Language is  English  Readiness To Learn: Receptive  Barriers To Learning: none  Preferred Learning: demonstration   Education Completed: further treatment/follow-up and treatment/procedure   Teaching Method: verbal   Person Taught: patient   Evaluation Of Learning: verbalized/demonstrated understanding         Provider Wound Care HPI: no new wound complaints      Active Problems    1  Aortic stenosis (424 1) (I35 0)   2  Aortic Valve Replacement   3  Cardiomegaly (429 3) (I51 7)   4  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 (459 31) (I87 311)   5  Diabetes Mellitus (250 00)   6  Diastolic congestive heart failure (428 30,428 0) (I50 30)   7  Dyslipidemia (272 4) (E78 5)   8  Edema (782 3) (R60 9)   9  Endocarditis, valve unspecified (424 90) (I38)   10  Hypertension (401 9) (I10)   11  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)   12  Left knee pain (719 46) (M25 562)   13  Osteoarthritis (715 90) (M19 90)   14  Paroxysmal atrial fibrillation (427 31) (I48 0)   15  Pseudogout (275 49)   16  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)   17   Venous insufficiency (chronic) (peripheral) (459 81) (I87 2)   18  Ventricular tachycardia (427 1) (I47 2)    Past Medical History    1  History of Acute deep vein thrombosis of lower limb, unspecified laterality   2  History of Diabetes Mellitus (250 00)   3  History of Dyslipidemia (272 4) (E78 5)   4  History of hypertension (V12 59) (Z86 79)   5  History of Morbid or severe obesity due to excess calories (278 01) (E66 01)   6  History of Open wound of foot (892 0) (S91 309A)   7  History of Pulmonary Embolism (V12 55)    Surgical History    1  History of Aortic Valve Replacement   2  Aortic Valve Replacement   3  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   4  History of Varicose Vein Ligation    Family History    1  Family history of Arthritis    2  Family history of Arthritis    Social History    · Denied: History of Alcohol Use (History)   · Marital History - Currently    · Never A Smoker   · No drug use    Current Meds   1  Atorvastatin Calcium 40 MG Oral Tablet; Take 1 tablet daily; Therapy: (Recorded:16Apr2015) to Recorded   2  Demadex 20 MG Oral Tablet; TAKE 1 TABLET ONCE DAILY; Therapy: (Recorded:24Apr2014) to Recorded   3  Diovan -12 5 MG Oral Tablet; TAKE 1 TABLET ONCE DAILY; Therapy: 15Apr2014 to Recorded   4  Lidocaine HCl (Local Anesth ) 4 % SOLN; Apply prn to wound(s) prior to debridment for   pain control; Therapy: (Marybeth Ca) to Recorded   5  Meloxicam 15 MG Oral Tablet; TAKE 1 TABLET DAILY WITH FOOD; Therapy: (Recorded:27Jun2014) to Recorded   6  Metamucil CAPS; Therapy: (Recorded:27Jun2014) to Recorded   7  MetFORMIN HCl 1000 MG (MOD) TB24; Take one tablet daily; Therapy: (Recorded:09Apr2014) to Recorded   8  Omeprazole 20 MG Oral Tablet Delayed Release; Take 1 tablet daily; Therapy: (Recorded:19Mar2014) to Recorded   9  Potassium Chloride Jesusita ER 20 MEQ Oral Tablet Extended Release; Take 1 tablet daily;    Therapy: (Recorded:24Apr2014) to Recorded   10  Sotalol HCl - 80 MG Oral Tablet; TAKE 1 TABLET EVERY 12 HOURS DAILY; Therapy: (Recorded:09Apr2014) to Recorded   11  Tylenol 325 MG Oral Tablet; Therapy: (Recorded:17Oct2013) to Recorded   12  Warfarin Sodium 7 5 MG Oral Tablet; TAKE 1 TABLET DAILY; Therapy: (Recorded:19Mar2014) to Recorded    Allergies    1  No Known Drug Allergies    Vitals  Vital Signs [Data Includes: Current Encounter]    Recorded: 16GUU6268 04:03PM   Temperature 98 2 F, Tympanic   Heart Rate 64, R Radial   Pulse Quality Regular, R Radial   Respiration 18   Respiration Quality Normal   Systolic 513, RUE, Sitting   Diastolic 70, RUE, Sitting   Height 6 ft    Weight 319 lb    BMI Calculated 43 26   BSA Calculated 2 6   Pain Scale 0     Physical Exam    Wound #1 Assessment wound #1 Location:, right medial ankle, started on 7/2015, Care for this wound started on 8/11/2015  Wound Status: not healed  Venous Ulcer: Full Thickness  Length: 0 9cm x Width: 0 3cm x Depth: 0 1cm   Total: 0 27sq cm   Wound Volume: 0 027cm3           Tissue type: Granulation and Slough   Color of Wound: Red - 90% and Yellow - 10%   Exudate Amount: Minimal   Exudate Type: Serosangiunous   Odor: None   Wound Edges: Callous   Periwound Skin Condition: Callus, Scaly        Physician/Provider Wound #1 Exam   I agree with the nursing assessment and documentation  Right medial ankle wound with small open area that is fairly superficial       Trg Deja 1: Wound Nursing Care Plan   Impaired Tissue Integrity related to: right medial ankle   Goals   Patient will achieve 100% epithelialization:  Patient will demonstrate and verbalize knowledge of their disease process and management:  Wound Nursing Care Interventions:   Provide moist wound healing:  Evaluate current medication regime for medications which may slow/impede wound healing:     Teach patient and/or family about disease process and management methods:    Evaluate effectiveness of all above measures every 4 weeks with Patient Specific CQI:    Other:  Plan of care initiated 8/11/2015, reviewed 9/15/15, reviewed 10/20/2015, reviewed 12/1/15,  reviewed 1/12/2016      Procedure      Wound #1: right medial ankle     Nurse Dressing Change:   Wound #1 The wound located on the right medial ankle  Wound care rendered as per Physician/Advanced Practitioner order/plan  Return to [address] [place] [address] 2 weeks  Comments:    Excisional Debridement Subcutaneous Tissue:   Wound was excisionally debrided to subcutaneous tissue as follows  Prior to the procedure, the patient was identified using two identifiers, the general consent was signed, the proper site of procedure was identified, and a time out was taken  Anesthesia: Local anesthesia with 4% topical lidocaine was utilized prior to the procedure for pain control  A curette was utilized to surgically excise devitalized tissue and/or slough through epidermis, dermis and into the subcutaneous tissue  The total sq cm excised was 0 2sq cm  See wound assessment for further details  There was minimal bleeding controlled with gentle pressure  the patient tolerated the procedure well without complication  CPT Code(s)   [id number] - Excisional DebridementTo Subcutaneous Tissue; first 20 sq cm        Future Appointments    Date/Time Provider Specialty Site   06/30/2016 08:00 AM Cardiology, [facility]     Signatures   Electronically signed by : Ricardo Pillai MD; Jan 26 2016  4:26PM EST                       (Author)    Electronically signed by : Ricardo Pillai MD; Jan 29 2016  2:55PM EST                       (Author)

## 2018-01-11 ENCOUNTER — ALLSCRIPTS OFFICE VISIT (OUTPATIENT)
Dept: OTHER | Facility: OTHER | Age: 83
End: 2018-01-11

## 2018-01-13 VITALS
BODY MASS INDEX: 41.75 KG/M2 | WEIGHT: 315 LBS | SYSTOLIC BLOOD PRESSURE: 170 MMHG | HEIGHT: 73 IN | DIASTOLIC BLOOD PRESSURE: 87 MMHG | HEART RATE: 76 BPM

## 2018-01-13 VITALS
DIASTOLIC BLOOD PRESSURE: 84 MMHG | HEART RATE: 62 BPM | BODY MASS INDEX: 41.75 KG/M2 | HEIGHT: 73 IN | WEIGHT: 315 LBS | SYSTOLIC BLOOD PRESSURE: 154 MMHG

## 2018-01-13 VITALS
WEIGHT: 315 LBS | SYSTOLIC BLOOD PRESSURE: 124 MMHG | HEIGHT: 72 IN | HEART RATE: 65 BPM | BODY MASS INDEX: 42.66 KG/M2 | DIASTOLIC BLOOD PRESSURE: 78 MMHG

## 2018-01-13 VITALS
HEART RATE: 64 BPM | WEIGHT: 315 LBS | SYSTOLIC BLOOD PRESSURE: 142 MMHG | DIASTOLIC BLOOD PRESSURE: 88 MMHG | BODY MASS INDEX: 41.75 KG/M2 | HEIGHT: 73 IN

## 2018-01-13 NOTE — CONSULTS
I had the pleasure of evaluating your patient, Claudetta Dredge  My full evaluation follows:      Chief Complaint  Pt here for a 6 month f/u  Pt complains of sob and swelling of his right leg  Patient is here today for follow up of chronic conditions described in HPI  History of Present Illness  Mr Liv Hernandez is an 81year old man with morbid obesity, hypertension, recent aortic valve replacement for endocarditis who returns for follow up  Has an increase in dyspnea and edema since last visit  Echocardiogram demonstrated normal left ventricular systolic function with normal AVR  Review of Systems      Cardiac: has swelling in the     Skin: No complaints of nonhealing sores or skin rash  Genitourinary: No complaints of recurrent urinary tract infections, frequent urination at night, difficult urination, blood in urine, kidney stones, loss of bladder control, no kidney or prostate problems, no erectile dysfunction  Psychological: No complaints of feeling depressed, anxiety, panic attacks, or difficulty concentrating  General: lack of energy/fatigue  Respiratory: No complaints of shortness of breath, cough with sputum, or wheezing  HEENT: No complaints of serious problems, hearing problems, nose problems, throat problems, or snoring  Gastrointestinal: No complaints of liver problems, nausea, vomiting, heartburn, constipation, bloody stools, diarrhea, problems swallowing, adbominal pain, or rectal bleeding  Hematologic: bleeding disorders   Neurological: No complaints of numbness, tingling, dizziness, weakness, seizures, headaches, syncope or fainting, AM fatigue, daytime sleepiness, no witnessed apnea episodes  Musculoskeletal: No complaints of arthritis, back pain, or painfull swelling  Active Problems    1  Aortic stenosis (424 1) (I35 0)   2  Aortic Valve Replacement   3  BPH (benign prostatic hyperplasia) (600 00) (N40 0)   4  BPH (benign prostatic hyperplasia) (600 00) (N40 0)   5  Cardiomegaly (429 3) (I51 7)   6  Chronic bilateral low back pain without sciatica (724 2,338 29) (M54 5,G89 29)   7  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 (459 31) (I87 311)   8  Compression fracture of L4 lumbar vertebra (805 4) (S32 040A)   9  Diabetes Mellitus (250 00)   10  Diastolic congestive heart failure (428 30,428 0) (I50 30)   11  Dyslipidemia (272 4) (E78 5)   12  Edema (782 3) (R60 9)   13  Endocarditis, valve unspecified (424 90) (I38)   14  Epididymitis (604 90) (N45 1)   15  Hematuria, gross (599 71) (R31 0)   16  Hydrocele (603 9) (N43 3)   17  Hypertension (401 9) (I10)   18  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)   19  Left knee pain (719 46) (M25 562)   20  Obesity, unspecified obesity severity, unspecified obesity type (278 00) (E66 9)   21  Osteoarthritis (715 90) (M19 90)   22  Paroxysmal atrial fibrillation (427 31) (I48 0)   23  Pseudogout (275 49)   24  Renal cyst (753 10) (N28 1)   25  Urinary tract infection, acute (599 0) (N39 0)   26  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)   27  Venous insufficiency (chronic) (peripheral) (459 81) (I87 2)   28  Ventricular tachycardia (427 1) (I47 2)    Past Medical History    · History of Acute deep vein thrombosis of lower limb, unspecified laterality   · History of Diabetes Mellitus (250 00)   · History of Dyslipidemia (272 4) (E78 5)   · History of hypertension (V12 59) (Z86 79)   · History of Morbid or severe obesity due to excess calories (278 01) (E66 01)   · History of Open wound of foot (892 0) (S91 309A)   · History of Pulmonary Embolism (V12 55)    The active problems and past medical history were reviewed and updated today  Surgical History    · History of Aortic Valve Replacement   · Aortic Valve Replacement   · 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   · History of Varicose Vein Ligation    The surgical history was reviewed and updated today         Family History    · Family history of Arthritis    · Family history of Arthritis    The family history was reviewed and updated today  Social History    · Denied: History of Alcohol Use (History)   · Marital History - Currently    · Never A Smoker   · No drug use  The social history was reviewed and updated today  The social history was reviewed and is unchanged  Current Meds   1  Atorvastatin Calcium 40 MG Oral Tablet; Take 1 tablet daily; Therapy: (Recorded:37Guu6462) to Recorded   2  Demadex 20 MG Oral Tablet; TAKE 1 TABLET ONCE DAILY; Therapy: (Recorded:50Dit2221) to Recorded   3  Diovan -12 5 MG Oral Tablet; TAKE 1 TABLET ONCE DAILY; Therapy: 15Apr2014 to Recorded   4  Lidocaine HCl (Local Anesth ) 4 % SOLN; Apply prn to wound(s) prior to debridment for   pain control; Therapy: (Tani Nash) to Recorded   5  Meloxicam 15 MG Oral Tablet; Therapy: (Recorded:95Whu1585) to Recorded   6  MetFORMIN HCl ER (MOD) 1000 MG Oral Tablet Extended Release 24 Hour; Take one   tablet daily; Therapy: (Recorded:09Apr2014) to Recorded   7  Multivitamins TABS; TAKE 1 TABLET DAILY; Therapy: (Recorded:20Apr2016) to Recorded   8  Nitrofurantoin Monohyd Macro 100 MG Oral Capsule; TAKE 1 CAPSULE EVERY 12   HOURS DAILY; Therapy: 62EOM4486 to (Evaluate:01Jun2017)  Requested for: 58HTX0879; Last   Rx:20Rgn1715 Ordered   9  Omeprazole 20 MG Oral Tablet Delayed Release; Take 1 tablet daily; Therapy: (Recorded:19Mar2014) to Recorded   10  Potassium Chloride Jesusita ER 20 MEQ Oral Tablet Extended Release; Take 1 tablet daily; Therapy: (Recorded:24Apr2014) to Recorded   11  Sotalol HCl - 80 MG Oral Tablet; TAKE 1 TABLET EVERY 12 HOURS DAILY; Therapy: (Recorded:09Apr2014) to Recorded   12  Tamsulosin HCl - 0 4 MG Oral Capsule; Therapy: (Recorded:27Orn4102) to Recorded   13  Tylenol 325 MG Oral Tablet; Therapy: (Recorded:91Lec0771) to Recorded   14   Warfarin Sodium 7 5 MG Oral Tablet; TAKE 1 TABLET DAILY; Therapy: (Recorded:19Mar2014) to Recorded    Allergies    1  No Known Drug Allergies    Vitals   Recorded: 62AGC9609 04:08PM   Heart Rate 62   Systolic 983, RUE, Sitting   Diastolic 84, RUE, Sitting   Height 6 ft 1 in   Weight 348 lb 7 oz   BMI Calculated 45 97   BSA Calculated 2 72     Physical Exam    Constitutional   General appearance: Abnormal   elderly obese male  Ears, Nose, Mouth, and Throat - External inspection of ears and nose: Normal without deformities or discharge  Neck   Neck and thyroid: Normal, supple, trachea midline, no thyromegaly  Pulmonary   Respiratory effort: No increased work of breathing or signs of respiratory distress  Auscultation of lungs: Clear to auscultation, no rales, no rhonchi, no wheezing, good air movement  Cardiovascular   Auscultation of heart: Abnormal   A grade 2 systolic murmur was heard at the RUSB  Examination of extremities for edema and/or varicosities: Abnormal   bilateral ankle 1+ pitting edema  Chest - Chest: Normal    Abdomen   Abdomen: Abnormal   obese  Musculoskeletal Gait and station: Normal gait  Skin - Skin and subcutaneous tissue: Normal without rashes or lesions  Skin is warm and well perfused, normal turgor  Neurologic - Speech: Normal     Psychiatric - Orientation to person, place, and time: Normal  Mood and affect: Normal       Results/Data    Rhythm and rate:  ventricular rate is 62 beats per minute  normal sinus rhythm  IA Interval: first degree heart block  QRS: left bundle branch block      Assessment    1  Aortic Valve Replacement   2  Diastolic congestive heart failure (428 30,428 0) (I50 30)   3  Dyslipidemia (272 4) (E78 5)   4  Edema (782 3) (R60 9)    Plan  Diastolic congestive heart failure, Edema, Hypertension, ICD (implantable  cardioverter-defibrillator) in place    · Torsemide 20 MG Oral Tablet (Demadex); Take 1 tablet twice daily   Rx By: Nany Mondragon; Dispense: 90 Days ; #:180 Tablet;  Refill: 3; For: Diastolic congestive heart failure, Edema, Hypertension, ICD (implantable cardioverter-defibrillator) in place; ROBERT = N; Sent To: [facility] 52 [id number]  Edema    · Follow-up visit in 2 months Evaluation and Treatment  Follow-up  Status: Hold For -  Scheduling  Requested for: 58UCC0877   Ordered; For: Edema; Ordered By: Ventura Vaca Performed:  Due: 75ZYZ0269  Paroxysmal atrial fibrillation    · EKG/ECG- POC; Status:Complete;   Done: 53FZZ9901   Perform: In Office; Due:18Oct2018; Last Updated By:Khanh Richmond; 10/18/2017 4:13:31 PM;Ordered; For:Paroxysmal atrial fibrillation; Ordered By:Miguel Angel Cutler; Discussion/Summary    81year old man with morbid obesity, hypertension, recent aortic valve replacement for endocarditis who returns for follow up  Has an increase in dyspnea and edema since last visit  Echocardiogram demonstrated normal left ventricular systolic function with normal AVR  Impression:  1  s/p aortic valve replacement for endocarditis - doing well  2  Hypertension - elevated  3  s/p ICD for ventricular tachycardia  4  DVT - on warfarin  5  Paroxysmal atrial fibrillation - on Sotalol  In normal sinus rhythm  6  Progressive dyspnea/edema - appears to be somewhat volume overloaded  Recommendations:  1  Increase Demadex to 20mg 2x/day at noon and 4 PM   2  Continue remainder of medications  3  Follow up in 2 months  3  Follow up in 6 months  Thank you very much for allowing me to participate in the care of this patient  If you have any questions, please do not hesitate to contact me        Future Appointments    Signatures   Electronically signed by : MAXIME Bains ; Oct 18 2017  4:26PM EST                       (Author)

## 2018-01-14 VITALS
HEIGHT: 73 IN | WEIGHT: 315 LBS | DIASTOLIC BLOOD PRESSURE: 80 MMHG | SYSTOLIC BLOOD PRESSURE: 152 MMHG | HEART RATE: 72 BPM | BODY MASS INDEX: 41.75 KG/M2

## 2018-01-14 VITALS
BODY MASS INDEX: 41.75 KG/M2 | DIASTOLIC BLOOD PRESSURE: 90 MMHG | HEIGHT: 73 IN | WEIGHT: 315 LBS | HEART RATE: 66 BPM | SYSTOLIC BLOOD PRESSURE: 142 MMHG

## 2018-01-16 NOTE — MISCELLANEOUS
Physical Exam    Wound #1 Assessment wound #1 Location:, right medial ankle, started on 7/2015, Care for this wound started on 8/11/2015  Wound Status: not healed  Venous Ulcer: Full Thickness  Length: 0 9cm x Width: 0 3cm x Depth: 0 1cm   Total: 0 27sq cm   Wound Volume: 0 027cm3           Tissue type: Granulation and Slough   Color of Wound: Red - 90% and Yellow - 10%   Exudate Amount: Minimal   Exudate Type: Serosangiunous   Odor: None   Wound Edges: Callous   Periwound Skin Condition: Callus, Scaly        Physician/Provider Wound #1 Exam   I agree with the nursing assessment and documentation  Right medial ankle wound with small open area that is fairly superficial       Wound Drsg  Orders/Instructions  Wound Identification Dressing Orders--Instructions:   Wound Identification and Instructions   Wound #1: right medial ankle    Wound Care Instructions  Discussed with Patient/Caregiver  Dressing Type: Emigdio Jovita with mild soap and water, normal saline, wound cleanser or as specified  Apply specified dressing to wound base/bed  To periwound apply: Skin prep barrier  Secondary dressing apply: Gauze  Secure with: Kareem, and tape  Dressing change frequency: Daily  Comments/Other:   Puracol with Dermagran over top today only   Apply compression using: compression stockings set to 30 - 40 mm Hg  Wound Goals  Wound Goals:   Healing Goals:   Fair healing potential secondary to moderate comorbid conditions  Wound edges will appear with evidence of contraction and epithelialization   Patient will achieve full wound closure and edema management for wound prevention       Future Appointments    Date/Time Provider Specialty Site   06/30/2016 08:00 AM Cardiology, [facility]:    Wound Nursing Care Plan   Impaired Tissue Integrity related to: right medial ankle   Goals   Patient will achieve 100% epithelialization:  Patient will demonstrate and verbalize knowledge of their disease process and management:  Wound Nursing Care Interventions:   Provide moist wound healing:  Evaluate current medication regime for medications which may slow/impede wound healing:     Teach patient and/or family about disease process and management methods:    Evaluate effectiveness of all above measures every 4 weeks with Patient Specific CQI:    Other:  Plan of care initiated 8/11/2015, reviewed 9/15/15, reviewed 10/20/2015, reviewed 12/1/15,  reviewed 1/12/2016      Signatures   Electronically signed by : Perla Jung MD; Jan 26 2016  4:26PM EST                       (Author)    Electronically signed by : Perla Jung MD; Jan 29 2016  2:55PM EST                       (Author)

## 2018-01-17 NOTE — MISCELLANEOUS
Physical Exam    Wound #1 Assessment wound #1 Location:, right medial ankle, started on 7/2015, Care for this wound started on 8/11/2015  Wound Status: not healed  Venous Ulcer: Full Thickness  Length: 1 2cm x Width: 0 3cm x Depth: 0 1cm   Total: 0 36sq cm   Wound Volume: 0 036cm3           Tissue type: Granulation and Slough   Color of Wound: Red - 90% and Yellow - 10%   Exudate Amount: Minimal   Exudate Type: Serosangiunous   Odor: None   Wound Edges: Intact   Periwound Skin Condition: Macerated, Callus, Scaly        Physician/Provider Wound #1 Exam   I agree with the nursing assessment and documentation  Right medial ankle wound with pink tissue on base, edges rolled, periwound clean  Wound Drsg  Orders/Instructions  Wound Identification Dressing Orders--Instructions:   Wound Identification and Instructions   Wound #1: right medial ankle    Wound Care Instructions  Discussed with Patient/Caregiver  Dressing Type: Marysue Olp with mild soap and water, normal saline, wound cleanser or as specified  Apply specified dressing to wound base/bed  To periwound apply: Skin prep barrier  Secondary dressing apply: Gauze  Secure with: Kareem, and tape  Dressing change frequency: Daily   Apply compression using: compression stockings set to 30 - 40 mm Hg  Wound Goals  Wound Goals:   Healing Goals:   Fair healing potential secondary to moderate comorbid conditions  Wound edges will appear with evidence of contraction and epithelialization   Patient will achieve full wound closure and edema management for wound prevention       Future Appointments    Date/Time Provider Specialty Site   06/30/2016 08:00 AM Cardiology, [facility]: Wound Nursing Care Plan   Impaired Tissue Integrity related to: right medial ankle   Goals   Patient will achieve 100% epithelialization:     Patient will demonstrate and verbalize knowledge of their disease process and management:  Wound Nursing Care Interventions:   Provide moist wound healing:  Evaluate current medication regime for medications which may slow/impede wound healing:     Teach patient and/or family about disease process and management methods:    Evaluate effectiveness of all above measures every 4 weeks with Patient Specific CQI:    Other:  Plan of care initiated 8/11/2015, reviewed 9/15/15, reviewed 10/20/2015, reviewed 12/1/15, reviewed 1/12/2016      Signatures   Electronically signed by : Asher Lee MD; Jan 12 2016  4:47PM EST                       (Author)    Electronically signed by : Asher Lee MD; Jan 20 2016 10:05AM EST                       (Author)

## 2018-01-17 NOTE — PROGRESS NOTES
Assessment   1  Epididymitis (604 90) (N45 1)   2  BPH (benign prostatic hyperplasia) (600 00) (N40 0)   3  Urinary tract infection, acute (599 0) (N39 0)   4  Morbid obesity (278 01) (E66 01)    Plan   BPH (benign prostatic hyperplasia)    · Follow-up PRN Evaluation and Treatment  Follow-up  Status: Complete  Done:    [id number]   Ordered; For: BPH (benign prostatic hyperplasia); Ordered By: Kathi Ku Performed:  Due: 30OLL0699  Morbid obesity    · *1 - [facility]  *  Status: Active  Requested    for: 27EMJ3206   Ordered; For: Morbid obesity; Ordered By: Kathi Ku Performed:  Due: 41ZVT2742  Care Summary provided  : Yes    Discussion/Summary   Discussion Summary:    Resolved UTI, epididymitis, hydrocele and BPH   is a 82 y/o male being managed by Dr Alvah Gosselin  He is currently doing well with no complaints  We discussed that he can follow up on an as-needed basis  He was instructed to call with any recurrence of UTI like symptoms or any other concerns  He was provided a referral to Medical weight management  All questions were answered  Chief Complaint   Chief Complaint Free Text Note Form: Patient presents for epididymitis, UTI and BPH      History of Present Illness   HPI: 82 y/o male with epididymitis, UTI, and hydrocele presents today for 6 month follow up  He denies any lower urinary tract symptoms  He has occasional nocturia 2 times a night  He does not find this bothersome  He denies any dysuria or urinary tract infections since his last visit  He denies any testicular pain  He has been doing well with no changes in his overall health  He states he is no longer taking Flomax  Review of Systems   Complete-Male Urology:      Constitutional: No fever or chills, feels well, no tiredness, no recent weight gain or weight loss  Respiratory: No complaints of shortness of breath, no wheezing, no cough, no SOB on exertion, no orthopnea or PND        Cardiovascular: No complaints of slow heart rate, no fast heart rate, no chest pain, no palpitations, no leg claudication, no lower extremity  Gastrointestinal: No complaints of abdominal pain, no constipation, no nausea or vomiting, no diarrhea or bloody stools  Genitourinary: Empty sensation-- and-- stream quality fair, but-- no dysuria,-- no urinary hesitancy,-- no hematuria,-- no incontinence-- and-- no feelings of urinary urgency--       The patient presents with complaints of nocturia (2 times )  Musculoskeletal: No complaints of arthralgia, no myalgias, no joint swelling or stiffness, no limb pain or swelling  Integumentary: No complaints of skin rash or skin lesions, no itching, no skin wound, no dry skin  Hematologic/Lymphatic: No complaints of swollen glands, no swollen glands in the neck, does not bleed easily, no easy bruising  Neurological: No compliants of headache, no confusion, no convulsions, no numbness or tingling, no dizziness or fainting, no limb weakness, no difficulty walking  ROS Reviewed:    ROS reviewed  Active Problems   1  Aortic stenosis (424 1) (I35 0)   2  Aortic Valve Replacement   3  BPH (benign prostatic hyperplasia) (600 00) (N40 0)   4  BPH (benign prostatic hyperplasia) (600 00) (N40 0)   5  Cardiomegaly (429 3) (I51 7)   6  Chronic bilateral low back pain without sciatica (724 2,338 29) (M54 5,G89 29)   7  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 (459 31) (I87 311)   8  Compression fracture of L4 lumbar vertebra (805 4) (S32 040A)   9  Diabetes Mellitus (250 00)   10  Diastolic congestive heart failure (428 30,428 0) (I50 30)   11  Dyslipidemia (272 4) (E78 5)   12  Edema (782 3) (R60 9)   13  Endocarditis, valve unspecified (424 90) (I38)   14  Epididymitis (604 90) (N45 1)   15  Hematuria, gross (599 71) (R31 0)   16  Hydrocele (603 9) (N43 3)   17  Hypertension (401 9) (I10)   18   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)   19  Left knee pain (719 46) (M25 562)   20  Obesity, unspecified obesity severity, unspecified obesity type (278 00) (E66 9)   21  Osteoarthritis (715 90) (M19 90)   22  Paroxysmal atrial fibrillation (427 31) (I48 0)   23  Pseudogout (275 49)   24  Renal cyst (753 10) (N28 1)   25  Urinary tract infection, acute (599 0) (N39 0)   26  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)   27  Venous insufficiency (chronic) (peripheral) (459 81) (I87 2)   28  Ventricular tachycardia (427 1) (I47 2)    Past Medical History   1  History of Acute deep vein thrombosis of lower limb, unspecified laterality   2  History of Diabetes Mellitus (250 00)   3  History of Dyslipidemia (272 4) (E78 5)   4  History of hypertension (V12 59) (Z86 79)   5  History of Morbid or severe obesity due to excess calories (278 01) (E66 01)   6  History of Open wound of foot (892 0) (S91 309A)   7  History of Pulmonary Embolism (V12 55)  Active Problems And Past Medical History Reviewed: The active problems and past medical history were reviewed and updated today  Surgical History   1  History of Aortic Valve Replacement   2  Aortic Valve Replacement   3  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   4  History of Varicose Vein Ligation  Surgical History Reviewed: The surgical history was reviewed and updated today  Family History   Daughter    1  Family history of Arthritis  Family History    2  Family history of Arthritis  Family History Reviewed: The family history was reviewed and updated today  Social History    · Denied: History of Alcohol Use (History)   · Marital History - Currently    · Never A Smoker   · No drug use  Social History Reviewed: The social history was reviewed and updated today  The social history was reviewed and is unchanged  Current Meds    1  Atorvastatin Calcium 40 MG Oral Tablet; Take 1 tablet daily;      Therapy: (Recorded:91Ohy5507) to Recorded   2  Lidocaine HCl (Local Anesth ) 4 % SOLN; Apply prn to wound(s) prior to debridment for     pain control; Therapy: (Alfred Solis) to Recorded   3  Meloxicam 15 MG Oral Tablet; Therapy: (Recorded:61Zft0752) to Recorded   4  MetFORMIN HCl ER (MOD) 1000 MG Oral Tablet Extended Release 24 Hour; Take one     tablet daily; Therapy: (Recorded:09Apr2014) to Recorded   5  Multivitamins TABS; TAKE 1 TABLET DAILY; Therapy: (Recorded:20Apr2016) to Recorded   6  Omeprazole 20 MG Oral Tablet Delayed Release; Take 1 tablet daily; Therapy: (Recorded:19Mar2014) to Recorded   7  Potassium Chloride Jesusita ER 20 MEQ Oral Tablet Extended Release; Take 1 tablet daily; Therapy: (Recorded:24Apr2014) to Recorded   8  Sotalol HCl - 80 MG Oral Tablet; TAKE 1 TABLET EVERY 12 HOURS DAILY; Therapy: (Recorded:09Apr2014) to Recorded   9  Tamsulosin HCl - 0 4 MG Oral Capsule; Therapy: (Recorded:06Jul2017) to Recorded   10  Torsemide 20 MG Oral Tablet; Take 1 tablet twice daily  Requested for: 22OBM1959; Last      Rx:[id number] Ordered   11  Tylenol 325 MG Oral Tablet; Therapy: (Recorded:17Oct2013) to Recorded   12  Valsartan-Hydrochlorothiazide 320-25 MG Oral Tablet; TAKE 1 TABLET BY MOUTH      EVERY DAY; Therapy: 15Apr2014 to (Manny Wilkinson)  Requested for: 66DFQ5945; Last      Rx:[id number] Ordered   13  Warfarin Sodium 7 5 MG Oral Tablet; TAKE 1 TABLET DAILY; Therapy: (Recorded:19Mar2014) to Recorded  Medication List Reviewed: The medication list was reviewed and updated today  Allergies   1  No Known Drug Allergies    Vitals   Vital Signs    Recorded: 07VGI0224 09:01AM   Heart Rate 64   Systolic 372   Diastolic 80   Height 6 ft 1 in   Weight 352 lb 6 oz   BMI Calculated 46 49   BSA Calculated 2 74     Physical Exam        Constitutional      General appearance: No acute distress, well appearing and well nourished         Pulmonary      Respiratory effort: No increased work of breathing or signs of respiratory distress  Cardiovascular      Palpation of heart: Normal PMI, no thrills  Examination of extremities for edema and/or varicosities: Normal        Abdomen      Abdomen: Non-tender, no masses  Musculoskeletal      Gait and station: Normal        Skin      Skin and subcutaneous tissue: Normal without rashes or lesions         Results/Data   AUA Symptom Score 77ETD5005 09:02AM User, Ahs      Test Name Result Flag Reference   AUA Symptom Score (for prostate disease) 2     Incomplete emptying: Not at all (0)     Frequency: Not at all (0)     Intermittency: Not at all (0)     Urgency: Not at all (0)     Weak-stream: Not at all (0)     Straining: Not at all (0)     Nocturia: Less than half the time (2)   AUA Symptom Score (for prostate disease) - Quality of Life Due to Urinary Symptoms Mostly satisfied     AUA Symptom Score (for prostate disease) - Score Category Mild          Future Appointments      Date/Time Provider Specialty Site   07/10/2018 08:00 AM Cardiology, [facility]   04/10/2018 08:30 AM Cardiology, Device Remote  [address]     Signatures    Electronically signed by : Maria Alejandra Decker, Tobin Rubi ; Jan 11 2018  9:32AM EST                       (Author)     Electronically signed by : MAXIME Bonilla ; Jan 16 2018  8:59AM EST

## 2018-01-22 VITALS
DIASTOLIC BLOOD PRESSURE: 90 MMHG | HEIGHT: 73 IN | HEART RATE: 64 BPM | SYSTOLIC BLOOD PRESSURE: 150 MMHG | BODY MASS INDEX: 41.75 KG/M2 | OXYGEN SATURATION: 94 % | WEIGHT: 315 LBS

## 2018-01-23 VITALS
HEART RATE: 64 BPM | WEIGHT: 315 LBS | SYSTOLIC BLOOD PRESSURE: 148 MMHG | DIASTOLIC BLOOD PRESSURE: 80 MMHG | HEIGHT: 73 IN | BODY MASS INDEX: 41.75 KG/M2

## 2018-01-23 NOTE — CONSULTS
I had the pleasure of evaluating your patient, Rica Goins  My full evaluation follows:      Chief Complaint  patient at the office for 2 months follow up, patient complain of shortness of breath when walk and right leg swelling almost daily  Patient is here today for follow up of chronic conditions described in HPI  History of Present Illness  Mr Merry Castano is an 81year old man with morbid obesity, hypertension, recent aortic valve replacement for endocarditis who returns for follow up  Dyspnea is stable  Echocardiogram demonstrated normal left ventricular systolic function with normal AVR  Takes Torsemide daily because urinates too much  Review of Systems      Cardiac: has swelling in the     Skin: No complaints of nonhealing sores or skin rash  Genitourinary: No complaints of recurrent urinary tract infections, frequent urination at night, difficult urination, blood in urine, kidney stones, loss of bladder control, no kidney or prostate problems, no erectile dysfunction  Psychological: No complaints of feeling depressed, anxiety, panic attacks, or difficulty concentrating  General: lack of energy/fatigue  Respiratory: No complaints of shortness of breath, cough with sputum, or wheezing  HEENT: No complaints of serious problems, hearing problems, nose problems, throat problems, or snoring  Gastrointestinal: No complaints of liver problems, nausea, vomiting, heartburn, constipation, bloody stools, diarrhea, problems swallowing, adbominal pain, or rectal bleeding  Hematologic: bleeding disorders   Neurological: No complaints of numbness, tingling, dizziness, weakness, seizures, headaches, syncope or fainting, AM fatigue, daytime sleepiness, no witnessed apnea episodes  Musculoskeletal: No complaints of arthritis, back pain, or painfull swelling  Active Problems    1  Aortic stenosis (424 1) (I35 0)   2  Aortic Valve Replacement   3   BPH (benign prostatic hyperplasia) (600 00) (N40 0)   4  BPH (benign prostatic hyperplasia) (600 00) (N40 0)   5  Cardiomegaly (429 3) (I51 7)   6  Chronic bilateral low back pain without sciatica (724 2,338 29) (M54 5,G89 29)   7  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 (459 31) (I87 311)   8  Compression fracture of L4 lumbar vertebra (805 4) (S32 040A)   9  Diabetes Mellitus (250 00)   10  Diastolic congestive heart failure (428 30,428 0) (I50 30)   11  Dyslipidemia (272 4) (E78 5)   12  Edema (782 3) (R60 9)   13  Endocarditis, valve unspecified (424 90) (I38)   14  Epididymitis (604 90) (N45 1)   15  Hematuria, gross (599 71) (R31 0)   16  Hydrocele (603 9) (N43 3)   17  Hypertension (401 9) (I10)   18  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)   19  Left knee pain (719 46) (M25 562)   20  Obesity, unspecified obesity severity, unspecified obesity type (278 00) (E66 9)   21  Osteoarthritis (715 90) (M19 90)   22  Paroxysmal atrial fibrillation (427 31) (I48 0)   23  Pseudogout (275 49)   24  Renal cyst (753 10) (N28 1)   25  Urinary tract infection, acute (599 0) (N39 0)   26  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)   27  Venous insufficiency (chronic) (peripheral) (459 81) (I87 2)   28  Ventricular tachycardia (427 1) (I47 2)    Past Medical History    · History of Acute deep vein thrombosis of lower limb, unspecified laterality   · History of Diabetes Mellitus (250 00)   · History of Dyslipidemia (272 4) (E78 5)   · History of hypertension (V12 59) (Z86 79)   · History of Morbid or severe obesity due to excess calories (278 01) (E66 01)   · History of Open wound of foot (892 0) (S91 309A)   · History of Pulmonary Embolism (V12 55)    The active problems and past medical history were reviewed and updated today        Surgical History    · History of Aortic Valve Replacement   · Aortic Valve Replacement   · 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   · History of Varicose Vein Ligation    The surgical history was reviewed and updated today  Family History    · Family history of Arthritis    · Family history of Arthritis    The family history was reviewed and updated today  Social History    · Denied: History of Alcohol Use (History)   · Marital History - Currently    · Never A Smoker   · No drug use  The social history was reviewed and updated today  The social history was reviewed and is unchanged  Current Meds   1  Atorvastatin Calcium 40 MG Oral Tablet; Take 1 tablet daily; Therapy: (Recorded:16Apr2015) to Recorded   2  Diovan -12 5 MG Oral Tablet; TAKE 1 TABLET ONCE DAILY; Therapy: 15Apr2014 to Recorded   3  Lidocaine HCl (Local Anesth ) 4 % SOLN; Apply prn to wound(s) prior to debridment for   pain control; Therapy: (Florentin Huynh) to Recorded   4  Meloxicam 15 MG Oral Tablet; Therapy: (Recorded:48Pbh8238) to Recorded   5  MetFORMIN HCl ER (MOD) 1000 MG Oral Tablet Extended Release 24 Hour; Take one   tablet daily; Therapy: (Recorded:09Apr2014) to Recorded   6  Multivitamins TABS; TAKE 1 TABLET DAILY; Therapy: (Recorded:20Apr2016) to Recorded   7  Nitrofurantoin Monohyd Macro 100 MG Oral Capsule; TAKE 1 CAPSULE EVERY 12   HOURS DAILY; Therapy: 37ADR6498 to (Evaluate:01Jun2017)  Requested for: 80NJQ8311; Last   Rx:[id number] Ordered   8  Omeprazole 20 MG Oral Tablet Delayed Release; Take 1 tablet daily; Therapy: (Recorded:19Mar2014) to Recorded   9  Potassium Chloride Jesusita ER 20 MEQ Oral Tablet Extended Release; Take 1 tablet daily; Therapy: (Recorded:24Apr2014) to Recorded   10  Sotalol HCl - 80 MG Oral Tablet; TAKE 1 TABLET EVERY 12 HOURS DAILY; Therapy: (Recorded:09Apr2014) to Recorded   11  Tamsulosin HCl - 0 4 MG Oral Capsule; Therapy: (Recorded:46Byd9708) to Recorded   12  Torsemide 20 MG Oral Tablet; Take 1 tablet twice daily  Requested for: 75EQC8139; Last    Rx:18Oct2017 Ordered   13   Tylenol 325 MG Oral Tablet; Therapy: (Recorded:17Oct2013) to Recorded   14  Warfarin Sodium 7 5 MG Oral Tablet; TAKE 1 TABLET DAILY; Therapy: (Recorded:19Mar2014) to Recorded    The medication list was reviewed and updated today  Allergies    1  No Known Drug Allergies    Vitals   Recorded: 93HER3894 04:17PM   Heart Rate 64   Systolic 354, RUE, Sitting   Diastolic 90, RUE, Sitting   Height 6 ft 1 in   Weight 352 lb    BMI Calculated 46 44   BSA Calculated 2 74   O2 Saturation 94     Physical Exam    Constitutional   General appearance: Abnormal   elderly obese male  Ears, Nose, Mouth, and Throat - External inspection of ears and nose: Normal without deformities or discharge  Neck   Neck and thyroid: Normal, supple, trachea midline, no thyromegaly  Pulmonary   Respiratory effort: No increased work of breathing or signs of respiratory distress  Auscultation of lungs: Clear to auscultation, no rales, no rhonchi, no wheezing, good air movement  Cardiovascular   Auscultation of heart: Abnormal   A grade 2 systolic murmur was heard at the RUSB  Examination of extremities for edema and/or varicosities: Abnormal   bilateral ankle 1+ pitting edema  Chest - Chest: Normal    Abdomen   Abdomen: Abnormal   obese  Musculoskeletal Gait and station: Normal gait  Skin - Skin and subcutaneous tissue: Normal without rashes or lesions  Skin is warm and well perfused, normal turgor  Neurologic - Speech: Normal     Psychiatric - Orientation to person, place, and time: Normal  Mood and affect: Normal       Results/Data    Rhythm and rate:  ventricular rate is 63 beats per minute  normal sinus rhythm  [place] Interval: first degree heart block  QRS: left bundle branch block      Assessment    1  Aortic Valve Replacement   2  Diastolic congestive heart failure (428 30,428 0) (I50 30)   3  Dyslipidemia (272 4) (E78 5)   4  Edema (782 3) (R60 9)   5  Hypertension (401 9) (I10)   6   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)    Plan  Hypertension    · Valsartan-Hydrochlorothiazide 320-25 MG Oral Tablet (Diovan HCT); TAKE 1  TABLET BY MOUTH EVERY DAY   Rx By: Karmen Aiken; Dispense: 90 Days ; #:90 Tablet; Refill: 3; For: Hypertension; ROBERT = N; Sent To: [place] [id number]  ICD (implantable cardioverter-defibrillator) in place    · Follow-up visit in 4 Months Evaluation and Treatment  Follow-up  Status: Hold For -  Scheduling  Requested for: 92BFP9180   Ordered; For: ICD (implantable cardioverter-defibrillator) in place; Ordered By: Karmen Aiken Performed:  Due: 48FHL2501    Discussion/Summary    81year old man with morbid obesity, hypertension, recent aortic valve replacement for endocarditis who returns for follow up  Dyspnea is stable  Echocardiogram demonstrated normal left ventricular systolic function with normal AVR  Takes Torsemide daily because urinates too much  Impression:  1  s/p aortic valve replacement for endocarditis - doing well  2  Hypertension - elevated  3  s/p ICD for ventricular tachycardia  4  DVT - on warfarin  5  Paroxysmal atrial fibrillation - on Sotalol  In normal sinus rhythm  6  Progressive dyspnea/edema - appears to be somewhat volume overloaded  Recommendations:  1  Change Diovan HCT to 320-25mg daily  2  Continue remainder of medications  3  Follow up in 4 months  Thank you very much for allowing me to participate in the care of this patient  If you have any questions, please do not hesitate to contact me        Future Appointments    Signatures   Electronically signed by : MAXIME Dixon ; Dec 14 2017  4:33PM EST                       (Author)

## 2018-01-23 NOTE — PROGRESS NOTES
Assessment    1  Epididymitis (604 90) (N45 1)   2  Urinary tract infection, acute (599 0) (N39 0)   3  BPH (benign prostatic hyperplasia) (600 00) (N40 0)   4  Obesity, unspecified obesity severity, unspecified obesity type (278 00) (E66 9)    Plan  BPH (benign prostatic hyperplasia)    · Follow-up visit in 6 months Evaluation and Treatment  Follow-up  Status: Complete   Done: 37EIR6862   Ordered; For: BPH (benign prostatic hyperplasia); Ordered By: Swapnil Sams Performed:  Due: 33LUQ1527; Last Updated By: Patsy Holm; 7/6/2017 9:23:09 AM  Obesity, unspecified obesity severity, unspecified obesity type    · *1 - [facility]  *  Status: Active  Requested  for: 90AKN0285   Ordered; For: Obesity, unspecified obesity severity, unspecified obesity type; Ordered By: Swapnil Sams Performed:  Due: 60YMU0729; Last Updated By: Patsy Holm; 7/6/2017 9:39:15 AM  Care Summary provided  : Yes  Urinary tract infection, acute    · Urine Dip Non-Automated- POC; Status:Complete - Retrospective By Protocol  Authorization;   Done: 48SZI5788 09:06AM   Performed: In Office; BTR:30XAG3219; Last Updated By:Esteban Wallis; 7/6/2017 9:07:41 AM;Ordered; For:Urinary tract infection, acute; Ordered By:Parth Domingo; Discussion/Summary  Discussion Summary:   UTI, epididymitis, hydrocele and BPH    Pratima Alfonso is a [de-identified] y/o male being managed by Dr Denise Baig  He is currently doing well with no complaints  His hematuria and urinary symptoms have resolved  His urine culture is negative for bacterial growth  There is no evidence of microscopic hematuria  A bladder ultrasound was obtained and his PVR was 28ml  He will return in 6 months for follow up  We discussed weight loss to assist with some of his complaints  Instructed to call with any issues        Chief Complaint  Chief Complaint Free Text Note Form: patient presents for PVR; UTI;epididymitis      History of Present Illness  HPI: [de-identified] y/o male with epididymitis, UTI, and hydrocele presents today for follow up  He completed antibiotics as prescribed  His dysuria, hematuria, and testicular pain has resolved  He is doing well with no urinary complaints  He denies any issues since his last visit  He does note that he has difficulty obtaining an erection  Review of Systems  Complete-Male Urology:   Constitutional: No fever or chills, feels well, no tiredness, no recent weight gain or weight loss  Respiratory: No complaints of shortness of breath, no wheezing, no cough, no SOB on exertion, no orthopnea or PND  Cardiovascular: No complaints of slow heart rate, no fast heart rate, no chest pain, no palpitations, no leg claudication, no lower extremity  Gastrointestinal: No complaints of abdominal pain, no constipation, no nausea or vomiting, no diarrhea or bloody stools  Genitourinary: Empty sensation and stream quality fair, but No complaints of dysuria, no incontinence, no hesitancy, no nocturia, no genital lesion, no testicular pain, no dysuria, no urinary hesitancy, no hematuria, no incontinence and no feelings of urinary urgency    The patient presents with complaints of nocturia (1-3x)  Musculoskeletal: No complaints of arthralgia, no myalgias, no joint swelling or stiffness, no limb pain or swelling  Integumentary: No complaints of skin rash or skin lesions, no itching, no skin wound, no dry skin  Hematologic/Lymphatic: No complaints of swollen glands, no swollen glands in the neck, does not bleed easily, no easy bruising  Neurological: No compliants of headache, no confusion, no convulsions, no numbness or tingling, no dizziness or fainting, no limb weakness, no difficulty walking  ROS Reviewed:   ROS reviewed  Active Problems    1  Aortic stenosis (424 1) (I35 0)   2  Aortic Valve Replacement   3  BPH (benign prostatic hyperplasia) (600 00) (N40 0)   4  BPH (benign prostatic hyperplasia) (600 00) (N40 0)   5   Cardiomegaly (429 3) (I51 7)   6  Chronic bilateral low back pain without sciatica (724 2,338 29) (M54 5,G89 29)   7  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 (459 31) (I87 311)   8  Compression fracture of L4 lumbar vertebra (805 4) (S32 040A)   9  Diabetes Mellitus (250 00)   10  Diastolic congestive heart failure (428 30,428 0) (I50 30)   11  Dyslipidemia (272 4) (E78 5)   12  Edema (782 3) (R60 9)   13  Endocarditis, valve unspecified (424 90) (I38)   14  Epididymitis (604 90) (N45 1)   15  Hematuria, gross (599 71) (R31 0)   16  Hydrocele (603 9) (N43 3)   17  Hypertension (401 9) (I10)   18  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)   19  Left knee pain (719 46) (M25 562)   20  Osteoarthritis (715 90) (M19 90)   21  Paroxysmal atrial fibrillation (427 31) (I48 0)   22  Pseudogout (275 49)   23  Renal cyst (753 10) (N28 1)   24  Urinary tract infection, acute (599 0) (N39 0)   25  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)   26  Venous insufficiency (chronic) (peripheral) (459 81) (I87 2)   27  Ventricular tachycardia (427 1) (I47 2)    Past Medical History    1  History of Acute deep vein thrombosis of lower limb, unspecified laterality   2  History of Diabetes Mellitus (250 00)   3  History of Dyslipidemia (272 4) (E78 5)   4  History of hypertension (V12 59) (Z86 79)   5  History of Morbid or severe obesity due to excess calories (278 01) (E66 01)   6  History of Open wound of foot (892 0) (S91 309A)   7  History of Pulmonary Embolism (V12 55)  Active Problems And Past Medical History Reviewed: The active problems and past medical history were reviewed and updated today  Surgical History    1  History of Aortic Valve Replacement   2  Aortic Valve Replacement   3  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   4  History of Varicose Vein Ligation  Surgical History Reviewed: The surgical history was reviewed and updated today         Family History  Daughter 1  Family history of Arthritis  Family History    2  Family history of Arthritis  Family History Reviewed: The family history was reviewed and updated today  Social History    · Denied: History of Alcohol Use (History)   · Marital History - Currently    · Never A Smoker   · No drug use  Social History Reviewed: The social history was reviewed and updated today  The social history was reviewed and is unchanged  Current Meds   1  Atorvastatin Calcium 40 MG Oral Tablet; Take 1 tablet daily; Therapy: (Recorded:16Apr2015) to Recorded   2  Demadex 20 MG Oral Tablet; TAKE 1 TABLET ONCE DAILY; Therapy: (Recorded:24Apr2014) to Recorded   3  Diovan -12 5 MG Oral Tablet; TAKE 1 TABLET ONCE DAILY; Therapy: 15Apr2014 to Recorded   4  Lidocaine HCl (Local Anesth ) 4 % SOLN; Apply prn to wound(s) prior to debridment for   pain control; Therapy: (Isreal Eli) to Recorded   5  Meloxicam 15 MG Oral Tablet; Therapy: (Recorded:76Ooo8746) to Recorded   6  MetFORMIN HCl ER (MOD) 1000 MG Oral Tablet Extended Release 24 Hour; Take one   tablet daily; Therapy: (Recorded:09Apr2014) to Recorded   7  Multivitamins TABS; TAKE 1 TABLET DAILY; Therapy: (Recorded:20Apr2016) to Recorded   8  Nitrofurantoin Monohyd Macro 100 MG Oral Capsule; TAKE 1 CAPSULE EVERY 12   HOURS DAILY; Therapy: 68CTF4167 to (Evaluate:01Jun2017)  Requested for: 33UAF0613; Last   Rx:40Dgz6114 Ordered   9  Omeprazole 20 MG Oral Tablet Delayed Release; Take 1 tablet daily; Therapy: (Recorded:19Mar2014) to Recorded   10  Potassium Chloride Jesusita ER 20 MEQ Oral Tablet Extended Release; Take 1 tablet daily; Therapy: (Recorded:24Apr2014) to Recorded   11  Sotalol HCl - 80 MG Oral Tablet; TAKE 1 TABLET EVERY 12 HOURS DAILY; Therapy: (Recorded:09Apr2014) to Recorded   12  Tamsulosin HCl - 0 4 MG Oral Capsule; Therapy: (Recorded:02Xik7461) to Recorded   13  Tylenol 325 MG Oral Tablet;     Therapy: (Recorded:17Oct2013) to Recorded   14  Warfarin Sodium 7 5 MG Oral Tablet; TAKE 1 TABLET DAILY; Therapy: (Recorded:19Mar2014) to Recorded  Medication List Reviewed: The medication list was reviewed and updated today  Allergies    1  No Known Drug Allergies    Vitals  Vital Signs    Recorded: 40FDE0750 09:06AM   Heart Rate 66   Systolic 151   Diastolic 90   Height 6 ft 1 in   Weight 349 lb    BMI Calculated 46 05   BSA Calculated 2 73     Physical Exam    Constitutional   General appearance: No acute distress, well appearing and well nourished  Pulmonary   Respiratory effort: No increased work of breathing or signs of respiratory distress  Cardiovascular   Palpation of heart: Normal PMI, no thrills  Examination of extremities for edema and/or varicosities: Normal     Abdomen   Abdomen: Non-tender, no masses  Musculoskeletal   Gait and station: Normal     Skin   Skin and subcutaneous tissue: Normal without rashes or lesions         Results/Data  AUA Symptom Score 29JPA6447 09:02AM User, s     Test Name Result Flag Reference   AUA Symptom Score (for prostate disease) 4     Incomplete emptying: Not at all (0)  Frequency: Less than 1 time in 5 (1)  Intermittency: Not at all (0)  Urgency: Not at all (0)  Weak-stream: Not at all (0)  Straining: Not at all (0)  Nocturia: About half the time (3)   AUA Symptom Score (for prostate disease) - Quality of Life Due to Urinary Symptoms Pleased     AUA Symptom Score (for prostate disease) - Score Category Mild       (Q) URINALYSIS REFLEX 94RAB0462 11:59AM Denise Keen     Test Name Result Flag Reference   COLOR DARK YELLOW  YELLOW   APPEARANCE CLEAR  CLEAR   SPECIFIC GRAVITY 1 021  1 001-1 035   [place] 30 5 5  5 0-8 0   GLUCOSE NEGATIVE  NEGATIVE   BILIRUBIN NEGATIVE  NEGATIVE   KETONES NEGATIVE  NEGATIVE   OCCULT BLOOD NEGATIVE  NEGATIVE   PROTEIN NEGATIVE  NEGATIVE   NITRITE NEGATIVE  NEGATIVE   LEUKOCYTE ESTERASE NEGATIVE  NEGATIVE     (1) URINE CULTURE 23Jun2017 11:59AM Denise Keen Test Name Result Flag Reference   CULTURE, URINE, ROUTINE      CULTURE, URINE, ROUTINE         MICRO NUMBER:      [id number]    TEST STATUS:       FINAL    SPECIMEN SOURCE:   URINE    SPECIMEN QUALITY:  ADEQUATE    RESULT:            No Growth     US KIDNEY AND BLADDER WITH PVR 08Apr2017 03:55PM Joseph Suarez     Test Name Result Flag Reference   US KIDNEY AND BLADDER WITH PVR (Report)     RENAL ULTRASOUND     INDICATION: 56-year-old male with history of UTI microhematuria  COMPARISON: Renal ultrasound 7/1/2010; CT abdomen pelvis 4/2/2017  TECHNIQUE:  Ultrasound of the retroperitoneum was performed with a curvilinear transducer utilizing volumetric sweeps and still imaging techniques  FINDINGS:     KIDNEYS:   Symmetric and normal size  Right kidney: 11 4 x 6 1 cm  Normal echogenicity and contour  No suspicious masses detected  An exophytic cyst arising from the upper pole is simple and anechoic measuring 4 0 x 2 7 x 3 3 cm  No hydronephrosis  No shadowing calculi  No perinephric fluid collections  Left kidney: 12 9 x 5 1 cm  Normal echogenicity and contour  No suspicious masses detected  A simple anechoic cyst in the midpole corresponds to the CT finding measuring 1 7 x 1 2 x 1 3 cm  No hydronephrosis  No shadowing calculi  No perinephric fluid collections  URETERS:   Nonvisualized  BLADDER:    Normally distended  No focal thickening or mass lesions  No significant postvoid residual urine volume  IMPRESSION:     Bilateral renal cysts including in the left mid pole which corresponds to the CT finding  Workstation performed: [id number]     Signed by:   Royer Olmedo MD   4/11/17     Procedure    Procedure: Bladder Ultrasound Post Void Residual    The procedure's were discussed with the patient  Equipment And Procedure: The patient voided  U/S Findings: bladder scan=28ml        Future Appointments    Date/Time Provider Specialty Site   07/10/2018 08:00 AM Cardiology, [address]   10/09/2017 01:00 PM Cardiology, Device Remote   [address]   01/11/2018 09:00 AM Nahed Romero, [address] [facility] Urology [facility]     Signatures   Electronically signed by : Tobin Madrigal; Jul 6 2017 10:37AM EST                       (Author)    Electronically signed by : MAXIME Herr ; Jul 7 2017 12:24PM EST

## 2018-03-07 NOTE — PROGRESS NOTES
"  Discussion/Summary  Normal device function      Results/Data  Results   Cardiac Device Remote [id number] 07:34AM Jose Clinton     Test Name Result Flag Reference   MISCELLANEOUS COMMENT      CARELINK TRANSMISSION: BATTERY STATUS "OK"   0% AP 3 2%  NO SIGNIFICANT HIGH RATE EPISODES  ALL OTHER TESTING WITHIN NORMAL LIMITS  NORMAL DEVICE FUNCTION  NC   Cardiac Electrophysiology Report      wbeczsmqxnauxk[id number]{[id number]}  pdf   DEVICE TYPE CRT-D       Cardiac Electrophysiology Report [id number] 07:34AM Jose Clinton     Test Name Result Flag Reference   Cardiac Electrophysiology Report      oijtriq[id number]  pdf     Signatures   Electronically signed by : Gianni Kitchen, ; Apr 5 2016  9:15AM EST                       (Author)    Electronically signed by : MAXIME Springer ; Apr 5 2016  1:06PM EST                       (Author)    "

## 2018-03-07 NOTE — PROGRESS NOTES
"  Discussion/Summary  Normal device function      Results/Data  Cardiac Device Remote [id number] 08:36AM Sandra Copeland     Test Name Result Flag Reference   MISCELLANEOUS COMMENT      CARELINK TRANSMISSION: BATTERY VOLTAGE ADEQUATE (7 6 YR)  AP 3 3%  < 0 1%  ALL LEAD PARAMETERS WITHIN NORMAL LIMITS  NO HIGH RATE EPISODES  NORMAL DEVICE FUNCTION  RG   Cardiac Electrophysiology Report      [id number] [id number]_[id number]  pdf   DEVICE TYPE ICD       Cardiac Electrophysiology Report [id number] 08:36AM Sandra Copeland     Test Name Result Flag Reference   Cardiac Electrophysiology Report      [id number]  pdf     Signatures   Electronically signed by : Walt Calles, ; Jan 23 2018 10:06AM EST                       (Author)    Electronically signed by : MAXIME Boyer ; Jan 23 2018  5:44PM EST                       (Author)    "

## 2018-03-07 NOTE — PROGRESS NOTES
"  Discussion/Summary  Normal device function     PAF  brief PAT  on sotalol  on coumadin  Results/Data  Cardiac Device Remote 03Oct2016 05:22AM Millicent Renthackrs     Test Name Result Flag Reference   MISCELLANEOUS COMMENT      CARELINK TRANSMISSION: BATTERY VOLTAGE ADEQUATE (8 9 YRS)  AP-3%, <0 1%  1 VHR EPISODE FOR 7 BEAT PAT  PT ON WARFARIN & SOTALOL  ALL AVAILABLE LEAD PARAMETERS WITHIN NORMAL LIMITS  NORMAL DEVICE FUNCTION  GV   Cardiac Electrophysiology Report      sl[id number] HospitalFARZAD_[facility]_1935_[id number]_[id number]  pdf   DEVICE TYPE ICD       Cardiac Electrophysiology Report [id number] 05:22AM Millicent Vides     Test Name Result Flag Reference   Cardiac Electrophysiology Report      [id number]  pdf     Signatures   Electronically signed by : Chikis Anderson RN; Oct  3 2016  3:21PM EST                       (Author)    Electronically signed by : MAXIME Kendall ; Oct  9 2016  3:39PM EST                       (Author)    "

## 2018-03-07 NOTE — PROGRESS NOTES
"  Discussion/Summary  Normal device function      Results/Data  Cardiac Device Remote [id number] 05:18AM Dean Felix     Test Name Result Flag Reference   MISCELLANEOUS COMMENT      CARELINK TRANSMISSION: BATTERY STATUS "OK"  AP 5%  0%  NO SIGNIFICANT HIGH RATE EPISODES  ALL LEAD PARAMETERS WITHIN NORMAL LIMITS  NORMAL DEVICE FUNCTION  NC   Cardiac Electrophysiology Report      sl[id number]_[id number]  pdf   DEVICE TYPE ICD       Cardiac Electrophysiology Report [id number] 05:18AM Dean Albany     Test Name Result Flag Reference   Cardiac Electrophysiology Report      [id number]  pdf     Signatures   Electronically signed by : Cory Hernandez, ; Jan 4 2017  3:21PM EST                       (Author)    Electronically signed by : Ventura Driscoll DO; Jan 7 2017  4:56PM EST                       (Author)    "

## 2018-03-07 NOTE — PROGRESS NOTES
"  Discussion/Summary  Normal device function      Results/Data  Cardiac Device Remote 09Oct2017 07:34AM Evan Mueller     Test Name Result Flag Reference   MISCELLANEOUS COMMENT      CARELINK TRANSMISSION: BATTERY VOLTAGE ADEQUATE (7 8 YRS)  AP 3 9%  <0 1%  ALL AVAILABLE LEAD PARAMETERS WITHIN NORMAL LIMITS  NO SIGNIFICANT HIGH RATE EPISODES  APPROPRIATELY FUNCTIONING ICD  CP   Cardiac Electrophysiology Report      [id number]_[facility]_1935_[id number]_[id number]  pdf   DEVICE TYPE ICD       Cardiac Electrophysiology Report [id number] 07:34AM Evan Ervin     Test Name Result Flag Reference   Cardiac Electrophysiology Report      Domonique Owens  pdf     Signatures   Electronically signed by : Obie Domínguez, ; Oct  9 2017  3:59PM EST                       (Author)    Electronically signed by : MAXIME Brewer ; Oct 19 2017  7:10PM EST                       (Author)    "

## 2018-03-07 NOTE — PROGRESS NOTES
"  Discussion/Summary  Normal device function      Results/Data  Cardiac Device In Clinic 30Jun2016 11:59AM Clari Gomez     Test Name Result Flag Reference   MISCELLANEOUS COMMENT      DEVICE INTERROGATED IN THE [facility] OFFICE  BATTERY VOLTAGE ADEQUATE (9 1 YRS)  AP-3%, <0 1%  NO NEW SIGNIFICANT HIGH RATE EPISODES  ALL LEAD PARAMETERS WITHIN NORMAL LIMITS  ALL OTHER TESTING WITHIN NORMAL LIMITS  NORMAL DEVICE FUNCTION  GV   Cardiac Electrophysiology Report      hmr[id number] Henry_[id number] [id number]_30_16_1  pdf   DEVICE TYPE ICD       Cardiac Electrophysiology Report [id number] 11:59AM Clari Gomez     Test Name Result Flag Reference   Cardiac Electrophysiology Report      ukl[id number]  pdf     Signatures   Electronically signed by : Benji Rebolledo RN; Jun 30 2016  8:14AM EST                       (Author)    Electronically signed by : MAXIME Valladares ; Jul 19 2016  2:49PM EST                       (Author)    "

## 2018-03-07 NOTE — PROGRESS NOTES
"  Discussion/Summary  Normal device function      Results/Data  Cardiac Device In Clinic [id number] 12:09PM Alturas Carbine     Test Name Result Flag Reference   MISCELLANEOUS COMMENT (Report)     DEVICE INTERROGATED IN THE [facility] OFFICE  BATTERY VOLTAGE ADEQUATE (8 1 YRS)  AP-4%, <0 1%  ALL LEAD PARAMETERS WITHIN NORMAL LIMITS  ALL OTHER TESTING WITHIN NORMAL LIMITS  NO NEW SIGNIFICANT HIGH RATE EPISODES  INCREASE MADE TO RA AMPLITUDE TO PROVIDE ADEQUATE SAFETY MARGIN  NORMAL DEVICE FUNCTION  GV   Cardiac Electrophysiology Report      vyvvkjyl[id number] Henry_[id number]_Session [id number]_06_17_1  pdf   DEVICE TYPE ICD       Cardiac Electrophysiology Report [id number] 12:09PM Alturas Carbine     Test Name Result Flag Reference   Cardiac Electrophysiology Report      fgyuc[id number]  pdf     Signatures   Electronically signed by : Adriana Sauer RN; Jul 6 2017  8:22AM EST                       (Author)    Electronically signed by : MAXIME Mea ; Jul 6 2017  8:51PM EST                       (Author)    "

## 2018-03-15 RX ORDER — TAMSULOSIN HYDROCHLORIDE 0.4 MG/1
CAPSULE ORAL
COMMUNITY
End: 2018-08-01

## 2018-03-15 RX ORDER — MELOXICAM 15 MG/1
TABLET ORAL DAILY
COMMUNITY
End: 2022-05-09 | Stop reason: HOSPADM

## 2018-03-15 RX ORDER — FERROUS ASPARTO GLYCINATE, IRON, ASCORBIC ACID, FOLIC ACID, CYANOCOBALAMIN, ZINC, SUCCINIC ACID, AND INTRINSIC FACTOR 50; 100; 60; 750; 250; 25; 15; 50; 100 MG/1; MG/1; MG/1; UG/1; UG/1; UG/1; MG/1; MG/1; MG/1
1 TABLET ORAL DAILY
Refills: 5 | COMMUNITY
Start: 2018-02-13

## 2018-03-15 RX ORDER — AMOXICILLIN 250 MG/1
CAPSULE ORAL
Refills: 2 | COMMUNITY
Start: 2017-12-29 | End: 2018-03-22 | Stop reason: ALTCHOICE

## 2018-03-15 RX ORDER — VALSARTAN AND HYDROCHLOROTHIAZIDE 320; 25 MG/1; MG/1
1 TABLET, FILM COATED ORAL DAILY
COMMUNITY
Start: 2014-04-15 | End: 2018-03-22

## 2018-03-15 RX ORDER — A/SINGAPORE/GP1908/2015 IVR-180 (H1N1) (AN A/MICHIGAN/45/2015 (H1N1)PDM09-LIKE VIRUS), A/HONG KONG/4801/2014, NYMC X-263B (H3N2) (AN A/HONG KONG/4801/2014-LIKE VIRUS), AND B/BRISBANE/60/2008, WILD TYPE (A B/BRISBANE/60/2008-LIKE VIRUS) 15; 15; 15 UG/.5ML; UG/.5ML; UG/.5ML
INJECTION, SUSPENSION INTRAMUSCULAR
Refills: 0 | COMMUNITY
Start: 2017-12-31 | End: 2018-03-22 | Stop reason: ALTCHOICE

## 2018-03-15 RX ORDER — NITROFURANTOIN 25; 75 MG/1; MG/1
1 CAPSULE ORAL EVERY 12 HOURS
COMMUNITY
Start: 2017-05-25 | End: 2018-08-01

## 2018-03-22 ENCOUNTER — OFFICE VISIT (OUTPATIENT)
Dept: CARDIOLOGY CLINIC | Facility: CLINIC | Age: 83
End: 2018-03-22
Payer: MEDICARE

## 2018-03-22 VITALS
SYSTOLIC BLOOD PRESSURE: 104 MMHG | WEIGHT: 315 LBS | HEIGHT: 73 IN | HEART RATE: 69 BPM | OXYGEN SATURATION: 97 % | DIASTOLIC BLOOD PRESSURE: 60 MMHG | BODY MASS INDEX: 41.75 KG/M2

## 2018-03-22 DIAGNOSIS — I10 ESSENTIAL HYPERTENSION: ICD-10-CM

## 2018-03-22 DIAGNOSIS — Z95.810 PRESENCE OF IMPLANTABLE CARDIOVERTER-DEFIBRILLATOR (ICD): ICD-10-CM

## 2018-03-22 DIAGNOSIS — I48.0 PAF (PAROXYSMAL ATRIAL FIBRILLATION) (HCC): Primary | ICD-10-CM

## 2018-03-22 DIAGNOSIS — Z95.2 S/P AVR: ICD-10-CM

## 2018-03-22 DIAGNOSIS — O22.30 DVT (DEEP VEIN THROMBOSIS) IN PREGNANCY: ICD-10-CM

## 2018-03-22 PROCEDURE — 99214 OFFICE O/P EST MOD 30 MIN: CPT | Performed by: INTERNAL MEDICINE

## 2018-03-22 RX ORDER — VALSARTAN 160 MG/1
160 TABLET ORAL DAILY
Qty: 30 TABLET | Refills: 5 | Status: SHIPPED | OUTPATIENT
Start: 2018-03-22 | End: 2018-03-23 | Stop reason: SDUPTHER

## 2018-03-22 NOTE — PROGRESS NOTES
Cardiology   Travis Pedro 81 y o  male MRN: [id number]        Impression:  1  s/p aortic valve replacement for endocarditis - doing well  2  Hypertension - controlled  3  s/p ICD for ventricular tachycardia  4  DVT - on warfarin   5  Paroxysmal atrial fibrillation - on Sotalol  In normal sinus rhythm  6  Progressive dyspnea/edema - Dyspnea is unchanged on diuretics  Does not note a change with diuretics, but urinates continually  Recommendations:  1  Change Diovan HCT to Valsartan 160mg daily  2  Continue remainder of medications  3  Follow up in 4 months  HPI: Travis Pedro is a 81y o  year old male with morbid obesity, hypertension, recent aortic valve replacement for endocarditis who returns for follow up  Dyspnea is stable  Echocardiogram demonstrated normal left ventricular systolic function with normal AVR  Takes Torsemide daily because urinates too much  Review of Systems   Constitutional: Negative  HENT: Negative  Eyes: Negative  Respiratory: Positive for shortness of breath  Negative for chest tightness  Cardiovascular: Negative for chest pain, palpitations and leg swelling  Gastrointestinal: Negative  Endocrine: Negative  Genitourinary: Negative  Musculoskeletal: Negative  Skin: Negative  Allergic/Immunologic: Negative  Neurological: Negative  Hematological: Negative  Psychiatric/Behavioral: Negative  All other systems reviewed and are negative  Past Medical History:   Diagnosis Date    Sepsis [facility])      Past Surgical History:   Procedure Laterality Date    AORTIC VALVE REPLACEMENT      CARDIAC SURGERY      TOTAL KNEE ARTHROPLASTY Left     VASCULAR SURGERY       History   Alcohol Use No     History   Drug Use No     History   Smoking Status    Never Smoker   Smokeless Tobacco    Never Used     No family history on file      Allergies:  No Known Allergies    Medications:     Current Outpatient Prescriptions:    acetaminophen (TYLENOL) 325 mg tablet, Take 2 tablets by mouth every 6 (six) hours as needed for mild pain or fever, Disp: 30 tablet, Rfl: 0    atorvastatin (LIPITOR) 40 mg tablet, Atorvastatin Calcium 40 MG Oral Tablet Take 1 tablet daily  Refills: 0  Active, Disp: , Rfl:     Iron Combinations (NIFEREX) TABS, TK 1 T PO BID, Disp: , Rfl: 5    meloxicam (MOBIC) 15 mg tablet, Take by mouth, Disp: , Rfl:     metFORMIN (GLUCOPHAGE) 1000 MG tablet, MetFORMIN HCl 1000 MG (MOD) TB24 Take one tablet daily  Refills: 0  Active, Disp: , Rfl:     Multiple Vitamin (MULTIVITAMINS PO), Take 1 tablet by mouth daily, Disp: , Rfl:     nitrofurantoin (MACROBID) 100 mg capsule, Take 1 capsule by mouth every 12 (twelve) hours, Disp: , Rfl:     omeprazole (PriLOSEC) 20 mg delayed release capsule, Omeprazole 20 MG Oral Tablet Delayed Release Take 1 tablet daily  Refills: 0  Active, Disp: , Rfl:     potassium chloride (K-DUR,KLOR-CON) 20 mEq tablet, Potassium Chloride Jesusita ER 20 MEQ Oral Tablet Extended Release Take 1 tablet daily  Refills: 0  Active, Disp: , Rfl:     sotalol AF (BETAPACE AF) 80 MG, 80 mg 2 (two) times a day  Sotalol HCl - 80 MG Oral Tablet TAKE 1 TABLET EVERY 12 HOURS DAILY    Refills: 0  Active , Disp: , Rfl:     tamsulosin (FLOMAX) 0 4 mg, Take by mouth, Disp: , Rfl:     valsartan-hydrochlorothiazide (DIOVAN-HCT) 320-25 MG per tablet, Take 1 tablet by mouth daily, Disp: , Rfl:     hydrochlorothiazide (HYDRODIURIL) 12 5 mg tablet, Take 1 tablet by mouth daily for 30 days, Disp: 30 tablet, Rfl: 0    torsemide (DEMADEX) 20 mg tablet, Take 1 tablet by mouth daily for 30 days, Disp: 30 tablet, Rfl: 0    valsartan (DIOVAN) 160 mg tablet, Take 1 tablet by mouth daily for 30 days, Disp: 30 tablet, Rfl: 0    warfarin (COUMADIN) 4 mg tablet, Take 1 tablet by mouth 2 (two) times a week for 30 days Sat/Wed, Disp: 8 tablet, Rfl: 0      Wt Readings from Last 3 Encounters:   03/22/18 (!) 157 kg (346 lb 14 4 oz) 18 (!) 160 kg (352 lb 6 oz)   17 (!) 160 kg (351 lb 15 9 oz)     Temp Readings from Last 3 Encounters:   17 98 °F (36 7 °C) (Oral)   16 98 4 °F (36 9 °C) (Oral)   16 98 2 °F (36 8 °C)     BP Readings from Last 3 Encounters:   18 104/60   18 148/80   17 150/90     Pulse Readings from Last 3 Encounters:   18 69   18 64   17 64         Physical Exam   Constitutional: He is oriented to person, place, and time  He appears well-developed  HENT:   Head: Normocephalic  Eyes: EOM are normal    Neck: Normal range of motion  Cardiovascular: Normal rate and regular rhythm  Exam reveals no gallop and no friction rub  Murmur heard  Systolic murmur is present with a grade of 2/6   Pulmonary/Chest: Effort normal and breath sounds normal  No respiratory distress  He has no wheezes  He has no rales  Abdominal: Soft  Musculoskeletal: Normal range of motion  Neurological: He is alert and oriented to person, place, and time  Skin: Skin is warm and dry  Psychiatric: He has a normal mood and affect           Laboratory Studies:  CMP:  Lab Results   Component Value Date     2017    K 4 0 2017     2017    CO2 25 2017    ANIONGAP 12 2017    BUN 23 2017    CREATININE 1 37 (H) 2017    GLUCOSE 119 2017    AST 19 2017    ALT 20 2017    BILITOT 1 20 (H) 2017    EGFR 50 0 2017         Cardiac testing:     Results for orders placed during the hospital encounter of 17   Echo complete with contrast if indicated    [facility] [place] [address]  [address]  [phone]    Transthoracic Echocardiogram  2D, M-mode, Doppler, and Color Doppler    Study date:  18-May-2017    Patient: Remi Gan  MR number: [id number]  Account number: [de-identified]  : 1935  Age: [de-identified] years  Gender: Male  Status: Outpatient  Location: [facility] [place]  Height: 73 in  Weight: 339 lb  BP: 152/ 80 mmHg    Indications: Atrial fibrillation  Diagnoses: I48 0 - Atrial fibrillation    Sonographer:  ABBY Martell  Primary Physician:  Blacno Winchester MD  Referring Physician:  Filemon Thomas MD  Group:  [id number]  Interpreting Physician:  Renée Noland MD    SUMMARY    PROCEDURE INFORMATION:  This was a technically difficult study  LEFT VENTRICLE:  Systolic function was normal  Ejection fraction was estimated to be 55 %  This study was inadequate for the evaluation of regional wall motion  Wall thickness was moderately increased  VENTRICULAR SEPTUM:  There was mild paradoxical motion  RIGHT VENTRICLE:  The ventricle was mildly dilated  Systolic function was mildly reduced  LEFT ATRIUM:  The atrium was mildly dilated  RIGHT ATRIUM:  The atrium was mildly dilated  MITRAL VALVE:  There was mild regurgitation  AORTIC VALVE:  A bioprosthesis was present  It exhibited normal function  TRICUSPID VALVE:  There was mild to moderate regurgitation  PULMONIC VALVE:  There was mild regurgitation  AORTA:  The root exhibited mild dilatation  3 68 cm    HISTORY: PRIOR HISTORY: Endocarditis; Aortic stenosis; Aortic valve replacement; Hypertension; Diabetes; ICD; Cardiomyopathy; DVT; IVC Filter    PROCEDURE: The study was performed in the [address]  This was a routine study  The transthoracic approach was used  The study included complete 2D imaging, M-mode, complete spectral Doppler, and color Doppler  The  heart rate was 66 bpm, at the start of the study  Images were obtained from the parasternal, apical, subcostal, and suprasternal notch acoustic windows  This was a technically difficult study  LEFT VENTRICLE: Size was normal  Systolic function was normal  Ejection fraction was estimated to be 55 %  This study was inadequate for the evaluation of regional wall motion  Wall thickness was moderately increased  No evidence of apical  thrombus  DOPPLER: Left ventricular diastolic function parameters were normal     VENTRICULAR SEPTUM: There was mild paradoxical motion  RIGHT VENTRICLE: The ventricle was mildly dilated  Systolic function was mildly reduced  Wall thickness was normal  A pacing wire was present  LEFT ATRIUM: The atrium was mildly dilated  RIGHT ATRIUM: The atrium was mildly dilated  MITRAL VALVE: Valve structure was normal  There was mild thickening  There was normal leaflet separation  DOPPLER: The transmitral velocity was within the normal range  There was no evidence for stenosis  There was mild regurgitation  AORTIC VALVE: The valve was trileaflet  Leaflets exhibited normal thickness and normal cuspal separation  A bioprosthesis was present  It exhibited normal function  DOPPLER: Transaortic velocity was within the normal range  There was no  evidence for stenosis  There was no significant regurgitation  TRICUSPID VALVE: The valve structure was normal  There was normal leaflet separation  DOPPLER: The transtricuspid velocity was within the normal range  There was no evidence for stenosis  There was mild to moderate regurgitation  PULMONIC VALVE: Leaflets exhibited normal thickness, no calcification, and normal cuspal separation  DOPPLER: The transpulmonic velocity was within the normal range  There was mild regurgitation  PERICARDIUM: There was no pericardial effusion  The pericardium was normal in appearance  AORTA: The root exhibited mild dilatation  3 68 cm    SYSTEMIC VEINS: IVC: The inferior vena cava was normal in size      SYSTEM MEASUREMENT TABLES    2D  %FS: 38 82 %  Ao Diam: 3 68 cm  EDV(Teich): 116 15 ml  EF Biplane: 52 92 %  EF(Cube): 77 1 %  EF(Teich): 68 98 %  ESV(Cube): 27 98 ml  ESV(Teich): 36 03 ml  IVSd: 1 52 cm  LA Area: 24 91 cm2  LA Diam: 3 51 cm  LVEDV MOD A2C: 101 48 ml  LVEDV MOD A4C: 143 69 ml  LVEDV MOD BP: 131 ml  LVEF MOD A2C: 53 51 %  LVEF MOD A4C: 50 94 %  LVESV MOD A2C: 47 18 ml  LVESV MOD A4C: 70 49 ml  LVESV MOD BP: 61 68 ml  LVIDd: 4 96 cm  LVIDs: 3 04 cm  LVLd A2C: 7 05 cm  LVLd A4C: 8 38 cm  LVLs A2C: 5 99 cm  LVLs A4C: 6 87 cm  LVOT Diam: 1 69 cm  LVPWd: 1 51 cm  RA Area: 26 72 cm2  RV Diam : 5 41 cm  SV MOD A2C: 54 31 ml  SV MOD A4C: 73 2 ml  SV(Cube): 94 18 ml  SV(Teich): 80 12 ml    CW  AV Env  Ti: 317 93 ms  AV VTI: 41 26 cm  AV Vmax: 1 79 m/s  AV Vmean: 1 3 m/s  AV maxP 96 mmHg  AV meanP 52 mmHg  HR: 60 11 BPM  MV VTI: 58 36 cm  MV Vmax: 1 76 m/s  MV Vmean: 1 08 m/s  MV maxP 37 mmHg  MV meanP 34 mmHg  TR Vmax: 2 92 m/s  TR maxP 14 mmHg    MM  TAPSE: 1 33 cm    PW  KISHA (VTI): 1 28 cm2  KISHA Vmax: 1 24 cm2  E': 0 06 m/s  E/E': 25 32  HR: 62 79 BPM  LVCO Dopp: 3 31 L/min  LVOT Env  Ti: 313 ms  LVOT VTI: 23 38 cm  LVOT Vmax: 0 99 m/s  LVOT Vmean: 0 75 m/s  LVOT maxPG: 3 91 mmHg  LVOT meanP 42 mmHg  LVSV Dopp: 52 64 ml  MV A Juan: 1 3 m/s  MV Dec [place]: 4 95 m/s2  MV DecT: 295 35 ms  MV E Juan: 1 46 m/s  MV E/A Ratio: 1 13  MVA (VTI): 0 9 cm2    [employer] Accredited Echocardiography Laboratory    Prepared and electronically signed by    Samantha Gallagher MD  Signed [id number] 05:13:10

## 2018-03-22 NOTE — PATIENT INSTRUCTIONS
Recommendations:  1  Change Diovan HCT to Valsartan 160mg daily  2  Continue remainder of medications    3  Follow up in 4 months

## 2018-03-23 ENCOUNTER — TELEPHONE (OUTPATIENT)
Dept: CARDIOLOGY CLINIC | Facility: CLINIC | Age: 83
End: 2018-03-23

## 2018-03-23 DIAGNOSIS — I10 ESSENTIAL HYPERTENSION: ICD-10-CM

## 2018-03-26 RX ORDER — VALSARTAN 160 MG/1
TABLET ORAL
Qty: 90 TABLET | Refills: 3 | Status: SHIPPED | OUTPATIENT
Start: 2018-03-26 | End: 2018-04-05 | Stop reason: SDUPTHER

## 2018-03-27 RX ORDER — VALSARTAN 160 MG/1
160 TABLET ORAL DAILY
Qty: 30 TABLET | Refills: 5 | OUTPATIENT
Start: 2018-03-27 | End: 2018-06-18 | Stop reason: SDUPTHER

## 2018-04-05 ENCOUNTER — OFFICE VISIT (OUTPATIENT)
Dept: CARDIOLOGY CLINIC | Facility: CLINIC | Age: 83
End: 2018-04-05
Payer: MEDICARE

## 2018-04-05 VITALS
HEIGHT: 73 IN | HEART RATE: 69 BPM | SYSTOLIC BLOOD PRESSURE: 158 MMHG | WEIGHT: 315 LBS | BODY MASS INDEX: 41.75 KG/M2 | DIASTOLIC BLOOD PRESSURE: 80 MMHG

## 2018-04-05 DIAGNOSIS — R06.02 SOB (SHORTNESS OF BREATH): Primary | ICD-10-CM

## 2018-04-05 DIAGNOSIS — I48.0 PAF (PAROXYSMAL ATRIAL FIBRILLATION) (HCC): ICD-10-CM

## 2018-04-05 DIAGNOSIS — I10 ESSENTIAL HYPERTENSION: ICD-10-CM

## 2018-04-05 DIAGNOSIS — Z95.810 PRESENCE OF IMPLANTABLE CARDIOVERTER-DEFIBRILLATOR (ICD): ICD-10-CM

## 2018-04-05 DIAGNOSIS — R06.02 SOB (SHORTNESS OF BREATH): ICD-10-CM

## 2018-04-05 DIAGNOSIS — E66.01 MORBID OBESITY (HCC): ICD-10-CM

## 2018-04-05 DIAGNOSIS — Z95.2 S/P AVR: ICD-10-CM

## 2018-04-05 DIAGNOSIS — O22.30 DVT (DEEP VEIN THROMBOSIS) IN PREGNANCY: ICD-10-CM

## 2018-04-05 PROBLEM — I50.33 ACUTE ON CHRONIC DIASTOLIC HEART FAILURE (HCC): Status: ACTIVE | Noted: 2018-04-05

## 2018-04-05 PROBLEM — R60.1 GENERALIZED EDEMA: Status: ACTIVE | Noted: 2018-04-05

## 2018-04-05 PROCEDURE — 99214 OFFICE O/P EST MOD 30 MIN: CPT | Performed by: INTERNAL MEDICINE

## 2018-04-05 PROCEDURE — 93000 ELECTROCARDIOGRAM COMPLETE: CPT | Performed by: INTERNAL MEDICINE

## 2018-04-05 RX ORDER — TORSEMIDE 20 MG/1
20 TABLET ORAL
Qty: 60 TABLET | Refills: 5 | Status: SHIPPED | OUTPATIENT
Start: 2018-04-05 | End: 2018-04-05 | Stop reason: SDUPTHER

## 2018-04-05 NOTE — PROGRESS NOTES
Cardiology   Derrick Sosa 81 y o  male MRN: [id number]        Impression:  1  s/p aortic valve replacement for endocarditis - doing well  2  Hypertension - controlled  3  s/p ICD for ventricular tachycardia  4  DVT - on warfarin   5  Paroxysmal atrial fibrillation - on Sotalol  In normal sinus rhythm  6  Acute on chronic diastolic heart failure - 13 lb weight gain over past several weeks  Recommendations:  1  Torsemide 20mg 2x/day at 9AM and 1PM for one week, then 20mg daily  2  Continue remainder of medications  3  Follow up in 2-3 weeks  HPI: Derrick Sosa is a 81y o  year old male with morbid obesity, hypertension, recent aortic valve replacement for endocarditis who returns for follow up  Has been feeling very dyspneic since decreasing torsemide  Has had increase in LE edema, predominantly in the right leg  Review of Systems   Constitutional: Negative  HENT: Negative  Eyes: Negative  Respiratory: Positive for shortness of breath  Negative for chest tightness  Cardiovascular: Positive for leg swelling  Negative for chest pain and palpitations  Gastrointestinal: Negative  Endocrine: Negative  Genitourinary: Negative  Musculoskeletal: Negative  Skin: Negative  Allergic/Immunologic: Negative  Neurological: Negative  Hematological: Negative  Psychiatric/Behavioral: Negative  All other systems reviewed and are negative  Past Medical History:   Diagnosis Date    [facility]epsis [facility])      Past Surgical History:   Procedure Laterality Date    AORTIC VALVE REPLACEMENT      CARDIAC SURGERY      TOTAL KNEE ARTHROPLASTY Left     VASCULAR SURGERY       History   Alcohol Use No     History   Drug Use No     History   Smoking Status    Never Smoker   Smokeless Tobacco    Never Used     No family history on file      Allergies:  No Known Allergies    Medications:     Current Outpatient Prescriptions:     acetaminophen (TYLENOL) 325 mg tablet, Take 2 tablets by mouth every 6 (six) hours as needed for mild pain or fever, Disp: 30 tablet, Rfl: 0    atorvastatin (LIPITOR) 40 mg tablet, Atorvastatin Calcium 40 MG Oral Tablet Take 1 tablet daily  Refills: 0  Active, Disp: , Rfl:     Iron Combinations (NIFEREX) TABS, TK 1 T PO BID, Disp: , Rfl: 5    meloxicam (MOBIC) 15 mg tablet, Take by mouth, Disp: , Rfl:     metFORMIN (GLUCOPHAGE) 1000 MG tablet, MetFORMIN HCl 1000 MG (MOD) TB24 Take one tablet daily  Refills: 0  Active, Disp: , Rfl:     Multiple Vitamin (MULTIVITAMINS PO), Take 1 tablet by mouth daily, Disp: , Rfl:     nitrofurantoin (MACROBID) 100 mg capsule, Take 1 capsule by mouth every 12 (twelve) hours, Disp: , Rfl:     omeprazole (PriLOSEC) 20 mg delayed release capsule, Omeprazole 20 MG Oral Tablet Delayed Release Take 1 tablet daily  Refills: 0  Active, Disp: , Rfl:     potassium chloride (K-DUR,KLOR-CON) 20 mEq tablet, Potassium Chloride Jesusita ER 20 MEQ Oral Tablet Extended Release Take 1 tablet daily  Refills: 0  Active, Disp: , Rfl:     sotalol AF (BETAPACE AF) 80 MG, 80 mg 2 (two) times a day  Sotalol HCl - 80 MG Oral Tablet TAKE 1 TABLET EVERY 12 HOURS DAILY    Refills: 0  Active , Disp: , Rfl:     tamsulosin (FLOMAX) 0 4 mg, Take by mouth, Disp: , Rfl:     valsartan (DIOVAN) 160 mg tablet, Take 1 tablet (160 mg total) by mouth daily, Disp: 30 tablet, Rfl: 5    torsemide (DEMADEX) 20 mg tablet, Take 1 tablet (20 mg total) by mouth 2 (two) times a day, Disp: 60 tablet, Rfl: 5    warfarin (COUMADIN) 4 mg tablet, Take 1 tablet by mouth 2 (two) times a week for 30 days Sat/Wed, Disp: 8 tablet, Rfl: 0      Wt Readings from Last 3 Encounters:   04/05/18 (!) 163 kg (359 lb)   03/22/18 (!) 157 kg (346 lb 14 4 oz)   01/11/18 (!) 160 kg (352 lb 6 oz)     Temp Readings from Last 3 Encounters:   04/04/17 98 °F (36 7 °C) (Oral)   12/28/16 98 4 °F (36 9 °C) (Oral)   04/23/16 98 2 °F (36 8 °C)     BP Readings from Last 3 Encounters:   04/05/18 158/80   03/22/18 104/60   18 148/80     Pulse Readings from Last 3 Encounters:   18 69   18 69   18 64         Physical Exam   Constitutional: He is oriented to person, place, and time  He appears well-developed  HENT:   Head: Normocephalic  Eyes: EOM are normal    Neck: Normal range of motion  Cardiovascular: Normal rate, regular rhythm and normal heart sounds  Exam reveals no gallop and no friction rub  No murmur heard  2+ B LE edema   Pulmonary/Chest: Effort normal and breath sounds normal  No respiratory distress  He has no wheezes  He has no rales  Abdominal: Soft  Musculoskeletal: Normal range of motion  Neurological: He is alert and oriented to person, place, and time  Skin: Skin is warm and dry  Psychiatric: He has a normal mood and affect  Laboratory Studies:  CMP:  Lab Results   Component Value Date     2017    K 4 0 2017     2017    CO2 25 2017    ANIONGAP 12 2017    BUN 23 2017    CREATININE 1 37 (H) 2017    GLUCOSE 119 2017    AST 19 2017    ALT 20 2017    BILITOT 1 20 (H) 2017    EGFR 50 0 2017       Cardiac testing:   EKG reviewed personally: NSR 69 LBBB  Results for orders placed during the hospital encounter of 17   Echo complete with contrast if indicated    [facility]  [address]  [phone]    Transthoracic Echocardiogram  2D, M-mode, Doppler, and Color Doppler    Study date:  18-May-2017    Patient: Ketan Talamantes  MR number: [id number]  Account number: [de-identified]  : 1935  Age: [de-identified] years  Gender: Male  Status: Outpatient  [facility] [address] [place] [address] [facility]  Height: 73 in  Weight: 339 lb  BP: 152/ 80 mmHg    Indications: Atrial fibrillation      Diagnoses: I48 0 - Atrial fibrillation    Sonographer:  ABBY Olivera  Primary Physician:  Julianne Hilliard MD  Referring Physician:  Sam Ventura Ilsa Salinas MD  Group:  [facility]  Interpreting Physician:  Keron Herrera MD    SUMMARY    PROCEDURE INFORMATION:  This was a technically difficult study  LEFT VENTRICLE:  Systolic function was normal  Ejection fraction was estimated to be 55 %  This study was inadequate for the evaluation of regional wall motion  Wall thickness was moderately increased  VENTRICULAR SEPTUM:  There was mild paradoxical motion  RIGHT VENTRICLE:  The ventricle was mildly dilated  Systolic function was mildly reduced  LEFT ATRIUM:  The atrium was mildly dilated  RIGHT ATRIUM:  The atrium was mildly dilated  MITRAL VALVE:  There was mild regurgitation  AORTIC VALVE:  A bioprosthesis was present  It exhibited normal function  TRICUSPID VALVE:  There was mild to moderate regurgitation  PULMONIC VALVE:  There was mild regurgitation  AORTA:  The root exhibited mild dilatation  3 68 cm    HISTORY: PRIOR HISTORY: Endocarditis; Aortic stenosis; Aortic valve replacement; Hypertension; Diabetes; ICD; Cardiomyopathy; DVT; IVC Filter    PROCEDURE: The study was performed in the [address]  This was a routine study  The transthoracic approach was used  The study included complete 2D imaging, M-mode, complete spectral Doppler, and color Doppler  The  heart rate was 66 bpm, at the start of the study  Images were obtained from the parasternal, apical, subcostal, and suprasternal notch acoustic windows  This was a technically difficult study  LEFT VENTRICLE: Size was normal  Systolic function was normal  Ejection fraction was estimated to be 55 %  This study was inadequate for the evaluation of regional wall motion  Wall thickness was moderately increased  No evidence of apical  thrombus  DOPPLER: Left ventricular diastolic function parameters were normal     VENTRICULAR SEPTUM: There was mild paradoxical motion  RIGHT VENTRICLE: The ventricle was mildly dilated  Systolic function was mildly reduced  Wall thickness was normal  A pacing wire was present  LEFT ATRIUM: The atrium was mildly dilated  RIGHT ATRIUM: The atrium was mildly dilated  MITRAL VALVE: Valve structure was normal  There was mild thickening  There was normal leaflet separation  DOPPLER: The transmitral velocity was within the normal range  There was no evidence for stenosis  There was mild regurgitation  AORTIC VALVE: The valve was trileaflet  Leaflets exhibited normal thickness and normal cuspal separation  A bioprosthesis was present  It exhibited normal function  DOPPLER: Transaortic velocity was within the normal range  There was no  evidence for stenosis  There was no significant regurgitation  TRICUSPID VALVE: The valve structure was normal  There was normal leaflet separation  DOPPLER: The transtricuspid velocity was within the normal range  There was no evidence for stenosis  There was mild to moderate regurgitation  PULMONIC VALVE: Leaflets exhibited normal thickness, no calcification, and normal cuspal separation  DOPPLER: The transpulmonic velocity was within the normal range  There was mild regurgitation  PERICARDIUM: There was no pericardial effusion  The pericardium was normal in appearance  AORTA: The root exhibited mild dilatation  3 68 cm    SYSTEMIC VEINS: IVC: The inferior vena cava was normal in size      SYSTEM MEASUREMENT TABLES    2D  %FS: 38 82 %  Ao Diam: 3 68 cm  EDV(Teich): 116 15 ml  EF Biplane: 52 92 %  EF(Cube): 77 1 %  EF(Teich): 68 98 %  ESV(Cube): 27 98 ml  ESV(Teich): 36 03 ml  IVSd: 1 52 cm  LA Area: 24 91 cm2  LA Diam: 3 51 cm  LVEDV MOD A2C: 101 48 ml  LVEDV MOD A4C: 143 69 ml  LVEDV MOD BP: 131 ml  LVEF MOD A2C: 53 51 %  LVEF MOD A4C: 50 94 %  LVESV MOD A2C: 47 18 ml  LVESV MOD A4C: 70 49 ml  LVESV MOD BP: 61 68 ml  LVIDd: 4 96 cm  LVIDs: 3 04 cm  LVLd A2C: 7 05 cm  LVLd A4C: 8 38 cm  LVLs A2C: 5 99 cm  LVLs A4C: 6 87 cm  LVOT Diam: 1 69 cm  LVPWd: 1 51 cm  RA Area: 26 72 cm2  RV Diam : 5 41 cm  SV MOD A2C: 54 31 ml  SV MOD A4C: 73 2 ml  SV(Cube): 94 18 ml  SV(Teich): 80 12 ml    CW  AV Env  Ti: 317 93 ms  AV VTI: 41 26 cm  AV Vmax: 1 79 m/s  AV Vmean: 1 3 m/s  AV maxP 96 mmHg  AV meanP 52 mmHg  HR: 60 11 BPM  MV VTI: 58 36 cm  MV Vmax: 1 76 m/s  MV Vmean: 1 08 m/s  MV maxP 37 mmHg  MV meanP 34 mmHg  TR Vmax: 2 92 m/s  TR maxP 14 mmHg    MM  TAPSE: 1 33 cm    PW  KISHA (VTI): 1 28 cm2  KISHA Vmax: 1 24 cm2  E': 0 06 m/s  E/E': 25 32  HR: 62 79 BPM  LVCO Dopp: 3 31 L/min  LVOT Env  Ti: 313 ms  LVOT VTI: 23 38 cm  LVOT Vmax: 0 99 m/s  LVOT Vmean: 0 75 m/s  LVOT maxPG: 3 91 mmHg  LVOT meanP 42 mmHg  LVSV Dopp: 52 64 ml  MV A Juan: 1 3 m/s  MV Dec [place]: 4 95 m/s2  MV DecT: 295 35 ms  MV E Juan: 1 46 m/s  MV E/A Ratio: 1 13  MVA (VTI): 0 9 cm2    [employer] Accredited Echocardiography Laboratory    Prepared and electronically signed by    Latrice Kaur MD  Signed [id number] 05:13:10

## 2018-04-05 NOTE — PATIENT INSTRUCTIONS
Recommendations:  1  Torsemide 20mg 2x/day at 9AM and 1PM for one week, then 20mg daily  2  Continue remainder of medications  3  Follow up in 2-3 weeks

## 2018-04-06 RX ORDER — TORSEMIDE 20 MG/1
TABLET ORAL
Qty: 180 TABLET | Refills: 5 | Status: SHIPPED | OUTPATIENT
Start: 2018-04-06 | End: 2019-07-19 | Stop reason: SDUPTHER

## 2018-04-10 ENCOUNTER — CLINICAL SUPPORT (OUTPATIENT)
Dept: CARDIOLOGY CLINIC | Facility: CLINIC | Age: 83
End: 2018-04-10
Payer: MEDICARE

## 2018-04-10 DIAGNOSIS — Z95.810 PRESENCE OF AUTOMATIC CARDIOVERTER/DEFIBRILLATOR (AICD): ICD-10-CM

## 2018-04-10 DIAGNOSIS — I47.2 VENTRICULAR TACHYARRHYTHMIA (HCC): Primary | ICD-10-CM

## 2018-04-10 DIAGNOSIS — I48.0 PAROXYSMAL ATRIAL FIBRILLATION (HCC): ICD-10-CM

## 2018-04-10 PROCEDURE — 93296 REM INTERROG EVL PM/IDS: CPT | Performed by: INTERNAL MEDICINE

## 2018-04-10 PROCEDURE — 93295 DEV INTERROG REMOTE 1/2/MLT: CPT | Performed by: INTERNAL MEDICINE

## 2018-04-10 NOTE — PROGRESS NOTES
[employer] TRANSMISSION: (ICD)BATTERY VOLTAGE ADEQUATE  (7 3 YRS)  AP 6%  <1%  ALL AVAILABLE LEAD PARAMETERS WITHIN NORMAL LIMITS  NO SIGNIFICANT HIGH RATE EPISODES   NORMAL DEVICE FUNCTION --HAWKINS

## 2018-05-08 ENCOUNTER — DOCUMENTATION (OUTPATIENT)
Dept: SURGERY | Facility: CLINIC | Age: 83
End: 2018-05-08

## 2018-05-08 DIAGNOSIS — I87.2 VENOUS STASIS ULCER OF RIGHT ANKLE LIMITED TO BREAKDOWN OF SKIN WITHOUT VARICOSE VEINS (HCC): Primary | ICD-10-CM

## 2018-05-08 DIAGNOSIS — L97.311 VENOUS STASIS ULCER OF RIGHT ANKLE LIMITED TO BREAKDOWN OF SKIN WITHOUT VARICOSE VEINS (HCC): Primary | ICD-10-CM

## 2018-05-08 RX ORDER — CEPHALEXIN 500 MG/1
500 TABLET ORAL 4 TIMES DAILY
Qty: 28 TABLET | Refills: 2 | Status: SHIPPED | OUTPATIENT
Start: 2018-05-08 | End: 2018-05-15

## 2018-05-09 LAB
LEFT EYE DIABETIC RETINOPATHY: NORMAL
RIGHT EYE DIABETIC RETINOPATHY: NORMAL

## 2018-05-10 ENCOUNTER — OFFICE VISIT (OUTPATIENT)
Dept: CARDIOLOGY CLINIC | Facility: CLINIC | Age: 83
End: 2018-05-10
Payer: MEDICARE

## 2018-05-10 VITALS
BODY MASS INDEX: 41.75 KG/M2 | HEIGHT: 73 IN | DIASTOLIC BLOOD PRESSURE: 68 MMHG | OXYGEN SATURATION: 97 % | WEIGHT: 315 LBS | HEART RATE: 70 BPM | SYSTOLIC BLOOD PRESSURE: 144 MMHG

## 2018-05-10 DIAGNOSIS — E66.01 MORBID OBESITY (HCC): ICD-10-CM

## 2018-05-10 DIAGNOSIS — I48.0 PAF (PAROXYSMAL ATRIAL FIBRILLATION) (HCC): ICD-10-CM

## 2018-05-10 DIAGNOSIS — Z95.810 PRESENCE OF IMPLANTABLE CARDIOVERTER-DEFIBRILLATOR (ICD): ICD-10-CM

## 2018-05-10 DIAGNOSIS — Z95.2 S/P AVR: ICD-10-CM

## 2018-05-10 DIAGNOSIS — I50.33 ACUTE ON CHRONIC DIASTOLIC HEART FAILURE (HCC): ICD-10-CM

## 2018-05-10 DIAGNOSIS — I48.91 ATRIAL FIBRILLATION, UNSPECIFIED TYPE (HCC): Primary | ICD-10-CM

## 2018-05-10 DIAGNOSIS — I10 ESSENTIAL HYPERTENSION: ICD-10-CM

## 2018-05-10 PROCEDURE — 99214 OFFICE O/P EST MOD 30 MIN: CPT | Performed by: INTERNAL MEDICINE

## 2018-05-10 PROCEDURE — 93000 ELECTROCARDIOGRAM COMPLETE: CPT | Performed by: INTERNAL MEDICINE

## 2018-05-10 NOTE — PROGRESS NOTES
Cardiology   Essie Gannon 81 y o  male MRN: [id number]        Impression:  1  s/p aortic valve replacement for endocarditis - doing well  2  Hypertension - controlled  3  s/p ICD for ventricular tachycardia  4  DVT - on warfarin   5  Paroxysmal atrial fibrillation - on Sotalol  In normal sinus rhythm  6  Acute on chronic diastolic heart failure - has lost 13 lbs since last visit      Recommendations:  1  Torsemide 20mg daily, except 20mg 2x/day on days not working   2  Continue remainder of medications  3  Check basic metabolic profile  4  Follow up in 3 months  HPI: Essie Gannon is a 81y o  year old male with morbid obesity, hypertension, recent aortic valve replacement for endocarditis who returns for follow up  Feels better since taking the increase in torsemide  Still with some dyspnea  Still with some edema  Review of Systems   Constitutional: Negative  HENT: Negative  Eyes: Negative  Respiratory: Positive for shortness of breath  Negative for chest tightness  Cardiovascular: Negative for chest pain and palpitations  Gastrointestinal: Negative  Endocrine: Negative  Genitourinary: Negative  Musculoskeletal: Negative  Skin: Negative  Allergic/Immunologic: Negative  Neurological: Negative  Hematological: Negative  Psychiatric/Behavioral: Negative  All other systems reviewed and are negative          Past Medical History:   Diagnosis Date    Atrial fibrillation ([address] )     [facility])     Hyperlipidemia     Hypertension     Sepsis ([address] )      Past Surgical History:   Procedure Laterality Date    AORTIC VALVE REPLACEMENT      CARDIAC SURGERY      TOTAL KNEE ARTHROPLASTY Left     VASCULAR SURGERY       History   Alcohol Use No     History   Drug Use No     History   Smoking Status    Never Smoker   Smokeless Tobacco    Never Used     Family History   Problem Relation Age of Onset    Arthritis Mother     Arthritis Father        Allergies:  No Known Allergies    Medications:     Current Outpatient Prescriptions:     acetaminophen (TYLENOL) 325 mg tablet, Take 2 tablets by mouth every 6 (six) hours as needed for mild pain or fever, Disp: 30 tablet, Rfl: 0    atorvastatin (LIPITOR) 40 mg tablet, Atorvastatin Calcium 40 MG Oral Tablet Take 1 tablet daily  Refills: 0  Active, Disp: , Rfl:     Cephalexin 500 MG tablet, Take 1 tablet (500 mg total) by mouth 4 (four) times a day for 7 days, Disp: 28 tablet, Rfl: 2    Iron Combinations (NIFEREX) TABS, TK 1 T PO BID, Disp: , Rfl: 5    meloxicam (MOBIC) 15 mg tablet, Take by mouth, Disp: , Rfl:     metFORMIN (GLUCOPHAGE) 1000 MG tablet, MetFORMIN HCl 1000 MG (MOD) TB24 Take one tablet daily  Refills: 0  Active, Disp: , Rfl:     Multiple Vitamin (MULTIVITAMINS PO), Take 1 tablet by mouth daily, Disp: , Rfl:     nitrofurantoin (MACROBID) 100 mg capsule, Take 1 capsule by mouth every 12 (twelve) hours, Disp: , Rfl:     omeprazole (PriLOSEC) 20 mg delayed release capsule, Omeprazole 20 MG Oral Tablet Delayed Release Take 1 tablet daily  Refills: 0  Active, Disp: , Rfl:     potassium chloride (K-DUR,KLOR-CON) 20 mEq tablet, Potassium Chloride Jesusita ER 20 MEQ Oral Tablet Extended Release Take 1 tablet daily  Refills: 0  Active, Disp: , Rfl:     sotalol AF (BETAPACE AF) 80 MG, 80 mg 2 (two) times a day  Sotalol HCl - 80 MG Oral Tablet TAKE 1 TABLET EVERY 12 HOURS DAILY    Refills: 0  Active , Disp: , Rfl:     tamsulosin (FLOMAX) 0 4 mg, Take by mouth, Disp: , Rfl:     torsemide (DEMADEX) 20 mg tablet, TAKE 1 TABLET(20 MG) BY MOUTH TWICE DAILY, Disp: 180 tablet, Rfl: 5    valsartan (DIOVAN) 160 mg tablet, Take 1 tablet (160 mg total) by mouth daily, Disp: 30 tablet, Rfl: 5    warfarin (COUMADIN) 4 mg tablet, Take 1 tablet by mouth 2 (two) times a week for 30 days Sat/Wed, Disp: 8 tablet, Rfl: 0      Wt Readings from Last 3 Encounters:   05/10/18 (!) 157 kg (346 lb)   04/05/18 (!) 163 kg (359 lb)   18 (!) 157 kg (346 lb 14 4 oz)     Temp Readings from Last 3 Encounters:   17 98 °F (36 7 °C) (Oral)   16 98 4 °F (36 9 °C) (Oral)   16 98 2 °F (36 8 °C)     BP Readings from Last 3 Encounters:   05/10/18 144/68   18 158/80   18 104/60     Pulse Readings from Last 3 Encounters:   05/10/18 70   18 69   18 69         Physical Exam   Constitutional: He is oriented to person, place, and time  He appears well-developed  HENT:   Head: Normocephalic  Eyes: EOM are normal    Neck: Normal range of motion  Cardiovascular: Normal rate, regular rhythm and normal heart sounds  Exam reveals no gallop and no friction rub  No murmur heard  1+ B LE edema   Pulmonary/Chest: Effort normal and breath sounds normal  No respiratory distress  He has no wheezes  He has no rales  Abdominal: Soft  Musculoskeletal: Normal range of motion  Neurological: He is alert and oriented to person, place, and time  Skin: Skin is warm and dry  Psychiatric: He has a normal mood and affect           Laboratory Studies:  CMP:  Lab Results   Component Value Date     2017    K 4 0 2017     2017    CO2 25 2017    ANIONGAP 12 2017    BUN 23 2017    CREATININE 1 37 (H) 2017    GLUCOSE 119 2017    AST 19 2017    ALT 20 2017    BILITOT 1 20 (H) 2017    EGFR 50 0 2017     Cardiac testing:   EKG reviewed personally: NSR 70 1st deg AVB LBBB  Results for orders placed during the hospital encounter of 17   Echo complete with contrast if indicated    Narrative [address]  [address]  [phone]    Transthoracic Echocardiogram  2D, M-mode, Doppler, and Color Doppler    Study date:  18-May-2017    Patient: Jacqueline Daugherty  MR number: [id number]  Account number: [de-identified]  : 1935  Age: [de-identified] years  Gender: Male  Status: Outpatient  Location: [address] [place] [address] and [facility]  Height: 73 in  Weight: 339 lb  BP: 152/ 80 mmHg    Indications: Atrial fibrillation  Diagnoses: I48 0 - Atrial fibrillation    Sonographer:  ABBY Lauren  Primary Physician:  Romana Pintos, MD  Referring Physician:  Can Cerrato MD  Group:  [id number]  Interpreting Physician:  Darian Bajwa MD    SUMMARY    PROCEDURE INFORMATION:  This was a technically difficult study  LEFT VENTRICLE:  Systolic function was normal  Ejection fraction was estimated to be 55 %  This study was inadequate for the evaluation of regional wall motion  Wall thickness was moderately increased  VENTRICULAR SEPTUM:  There was mild paradoxical motion  RIGHT VENTRICLE:  The ventricle was mildly dilated  Systolic function was mildly reduced  LEFT ATRIUM:  The atrium was mildly dilated  RIGHT ATRIUM:  The atrium was mildly dilated  MITRAL VALVE:  There was mild regurgitation  AORTIC VALVE:  A bioprosthesis was present  It exhibited normal function  TRICUSPID VALVE:  There was mild to moderate regurgitation  PULMONIC VALVE:  There was mild regurgitation  AORTA:  The root exhibited mild dilatation  3 68 cm    HISTORY: PRIOR HISTORY: Endocarditis; Aortic stenosis; Aortic valve replacement; Hypertension; Diabetes; ICD; Cardiomyopathy; DVT; IVC Filter    PROCEDURE: The study was performed in the [address]  This was a routine study  The transthoracic approach was used  The study included complete 2D imaging, M-mode, complete spectral Doppler, and color Doppler  The  heart rate was 66 bpm, at the start of the study  Images were obtained from the parasternal, apical, subcostal, and suprasternal notch acoustic windows  This was a technically difficult study  LEFT VENTRICLE: Size was normal  Systolic function was normal  Ejection fraction was estimated to be 55 %   This study was inadequate for the evaluation of regional wall motion  Wall thickness was moderately increased  No evidence of apical  thrombus  DOPPLER: Left ventricular diastolic function parameters were normal     VENTRICULAR SEPTUM: There was mild paradoxical motion  RIGHT VENTRICLE: The ventricle was mildly dilated  Systolic function was mildly reduced  Wall thickness was normal  A pacing wire was present  LEFT ATRIUM: The atrium was mildly dilated  RIGHT ATRIUM: The atrium was mildly dilated  MITRAL VALVE: Valve structure was normal  There was mild thickening  There was normal leaflet separation  DOPPLER: The transmitral velocity was within the normal range  There was no evidence for stenosis  There was mild regurgitation  AORTIC VALVE: The valve was trileaflet  Leaflets exhibited normal thickness and normal cuspal separation  A bioprosthesis was present  It exhibited normal function  DOPPLER: Transaortic velocity was within the normal range  There was no  evidence for stenosis  There was no significant regurgitation  TRICUSPID VALVE: The valve structure was normal  There was normal leaflet separation  DOPPLER: The transtricuspid velocity was within the normal range  There was no evidence for stenosis  There was mild to moderate regurgitation  PULMONIC VALVE: Leaflets exhibited normal thickness, no calcification, and normal cuspal separation  DOPPLER: The transpulmonic velocity was within the normal range  There was mild regurgitation  PERICARDIUM: There was no pericardial effusion  The pericardium was normal in appearance  AORTA: The root exhibited mild dilatation  3 68 cm    SYSTEMIC VEINS: IVC: The inferior vena cava was normal in size      SYSTEM MEASUREMENT TABLES    2D  %FS: 38 82 %  Ao Diam: 3 68 cm  EDV(Teich): 116 15 ml  EF Biplane: 52 92 %  EF(Cube): 77 1 %  EF(Teich): 68 98 %  ESV(Cube): 27 98 ml  ESV(Teich): 36 03 ml  IVSd: 1 52 cm  LA Area: 24 91 cm2  LA Diam: 3 51 cm  LVEDV MOD A2C: 101 48 ml  LVEDV MOD A4C: 143 69 ml  LVEDV MOD BP: 131 ml  LVEF MOD A2C: 53 51 %  LVEF MOD A4C: 50 94 %  LVESV MOD A2C: 47 18 ml  LVESV MOD A4C: 70 49 ml  LVESV MOD BP: 61 68 ml  LVIDd: 4 96 cm  LVIDs: 3 04 cm  LVLd A2C: 7 05 cm  LVLd A4C: 8 38 cm  LVLs A2C: 5 99 cm  LVLs A4C: 6 87 cm  LVOT Diam: 1 69 cm  LVPWd: 1 51 cm  RA Area: 26 72 cm2  RV Diam : 5 41 cm  SV MOD A2C: 54 31 ml  SV MOD A4C: 73 2 ml  SV(Cube): 94 18 ml  SV(Teich): 80 12 ml    CW  AV Env  Ti: 317 93 ms  AV VTI: 41 26 cm  AV Vmax: 1 79 m/s  AV Vmean: 1 3 m/s  AV maxP 96 mmHg  AV meanP 52 mmHg  HR: 60 11 BPM  MV VTI: 58 36 cm  MV Vmax: 1 76 m/s  MV Vmean: 1 08 m/s  MV maxP 37 mmHg  MV meanP 34 mmHg  TR Vmax: 2 92 m/s  TR maxP 14 mmHg    MM  TAPSE: 1 33 cm    PW  KISHA (VTI): 1 28 cm2  KISHA Vmax: 1 24 cm2  E': 0 06 m/s  E/E': 25 32  HR: 62 79 BPM  LVCO Dopp: 3 31 L/min  LVOT Env  Ti: 313 ms  LVOT VTI: 23 38 cm  LVOT Vmax: 0 99 m/s  LVOT Vmean: 0 75 m/s  LVOT maxPG: 3 91 mmHg  LVOT meanP 42 mmHg  LVSV Dopp: 52 64 ml  MV A Juan: 1 3 m/s  MV Dec Cassia: 4 95 m/s2  MV DecT: 295 35 ms  MV E Juan: 1 46 m/s  MV E/A Ratio: 1 13  MVA (VTI): 0 9 cm2    [employer] Accredited Echocardiography Laboratory    Prepared and electronically signed by    Celena Zamudio MD  Signed [id number] 05:13:10

## 2018-05-10 NOTE — PATIENT INSTRUCTIONS
Recommendations:  1  Torsemide 20mg daily, except 20mg 2x/day on days not working   2  Continue remainder of medications  3  Check basic metabolic profile  4  Follow up in 3 months

## 2018-05-12 LAB
BUN SERPL-MCNC: 38 MG/DL (ref 7–25)
BUN/CREAT SERPL: 25 (CALC) (ref 6–22)
CALCIUM SERPL-MCNC: 9.1 MG/DL (ref 8.6–10.3)
CHLORIDE SERPL-SCNC: 106 MMOL/L (ref 98–110)
CO2 SERPL-SCNC: 27 MMOL/L (ref 20–31)
CREAT SERPL-MCNC: 1.53 MG/DL (ref 0.7–1.11)
GLUCOSE SERPL-MCNC: 131 MG/DL (ref 65–99)
POTASSIUM SERPL-SCNC: 4.5 MMOL/L (ref 3.5–5.3)
SL AMB EGFR AFRICAN AMERICAN: 49 ML/MIN/1.73M2
SL AMB EGFR NON AFRICAN AMERICAN: 42 ML/MIN/1.73M2
SODIUM SERPL-SCNC: 141 MMOL/L (ref 135–146)

## 2018-06-01 ENCOUNTER — TELEPHONE (OUTPATIENT)
Dept: UROLOGY | Facility: HOSPITAL | Age: 83
End: 2018-06-01

## 2018-06-01 NOTE — TELEPHONE ENCOUNTER
Patient called stating that he was having some burning with urination  He was not have any blood or anything,  Suggested going for a urine culture and possibly drinking more water  He wanted to try drinking more water   He would call back Monday if he felt he need the order for a urine culture

## 2018-06-05 ENCOUNTER — TRANSCRIBE ORDERS (OUTPATIENT)
Dept: ADMINISTRATIVE | Facility: HOSPITAL | Age: 83
End: 2018-06-05

## 2018-06-05 DIAGNOSIS — L97.311 SKIN ULCER OF RIGHT ANKLE, LIMITED TO BREAKDOWN OF SKIN (HCC): Primary | ICD-10-CM

## 2018-06-18 DIAGNOSIS — I10 ESSENTIAL HYPERTENSION: ICD-10-CM

## 2018-06-18 RX ORDER — VALSARTAN 160 MG/1
160 TABLET ORAL DAILY
Qty: 30 TABLET | Refills: 11 | Status: SHIPPED | OUTPATIENT
Start: 2018-06-18 | End: 2018-08-30

## 2018-06-19 ENCOUNTER — TELEPHONE (OUTPATIENT)
Dept: CARDIOLOGY CLINIC | Facility: CLINIC | Age: 83
End: 2018-06-19

## 2018-06-19 NOTE — TELEPHONE ENCOUNTER
Patient decided that he would like a new script for the Valsartan 160mg, instead of using the valsartan/hctz that he received in error  He states he will pay out of pocket for the script

## 2018-06-19 NOTE — TELEPHONE ENCOUNTER
Patient received a bottle of Valsartan HCTZ 320/25, due to pharmacy working off an old script  Patient was switched to regular Valsartan 160mg back in march  Pharmacy will not allow him to take these pills back and he will have to pay out of pocket for a new script  Can he use the combination pill until he is out or should or should I call in a new script of regular valsartan for him    FYI he is on Torsemide currently

## 2018-06-19 NOTE — TELEPHONE ENCOUNTER
He can continue current medications, but check BMP in 2 weeks  Also, if he feels lightheaded, let us know  Thanks

## 2018-06-21 ENCOUNTER — HOSPITAL ENCOUNTER (OUTPATIENT)
Dept: NON INVASIVE DIAGNOSTICS | Facility: CLINIC | Age: 83
Discharge: HOME | End: 2018-06-21
Payer: MEDICARE

## 2018-06-21 DIAGNOSIS — L97.311 SKIN ULCER OF RIGHT ANKLE, LIMITED TO BREAKDOWN OF SKIN (HCC): ICD-10-CM

## 2018-06-21 PROCEDURE — 93970 EXTREMITY STUDY: CPT

## 2018-06-21 PROCEDURE — 93925 LOWER EXTREMITY STUDY: CPT

## 2018-06-21 PROCEDURE — 93923 UPR/LXTR ART STDY 3+ LVLS: CPT

## 2018-06-22 PROCEDURE — 93970 EXTREMITY STUDY: CPT | Performed by: SURGERY

## 2018-06-22 PROCEDURE — 93925 LOWER EXTREMITY STUDY: CPT | Performed by: SURGERY

## 2018-06-22 PROCEDURE — 93922 UPR/L XTREMITY ART 2 LEVELS: CPT | Performed by: SURGERY

## 2018-06-27 ENCOUNTER — TELEPHONE (OUTPATIENT)
Dept: SURGERY | Facility: CLINIC | Age: 83
End: 2018-06-27

## 2018-06-27 NOTE — TELEPHONE ENCOUNTER
I let him know about the vascular studies  There may be a vessel at the ulcer site  I told him he should make an appointment to see 1 of the vascular surgeons for evaluation  He understands and is agreeable

## 2018-07-10 ENCOUNTER — IN-CLINIC DEVICE VISIT (OUTPATIENT)
Dept: CARDIOLOGY CLINIC | Facility: CLINIC | Age: 83
End: 2018-07-10
Payer: MEDICARE

## 2018-07-10 DIAGNOSIS — I47.2 VENTRICULAR TACHYCARDIA (HCC): Primary | ICD-10-CM

## 2018-07-10 DIAGNOSIS — I50.32 CHRONIC DIASTOLIC CONGESTIVE HEART FAILURE (HCC): ICD-10-CM

## 2018-07-10 DIAGNOSIS — I48.0 PAROXYSMAL ATRIAL FIBRILLATION (HCC): ICD-10-CM

## 2018-07-10 DIAGNOSIS — Z95.810 PRESENCE OF CARDIOVERTER DEFIBRILLATOR: ICD-10-CM

## 2018-07-10 PROCEDURE — 93283 PRGRMG EVAL IMPLANTABLE DFB: CPT | Performed by: INTERNAL MEDICINE

## 2018-07-10 NOTE — PROGRESS NOTES
Results for orders placed or performed in visit on 07/10/18   Cardiac EP device report    Narrative    MDT DUAL CHAMBER ICD  DEVICE INTERROGATED IN THE [facility] OFFICE  BATTERY VOLTAGE ADEQUATE (7 1 YRS)  AP - 3 6%  - <0 1%  ALL LEAD PARAMETERS TEST WITHIN NORMAL LIMITS & STABLE  ALL OTHER TESTING WITHIN NORMAL LIMITS  NO SIGNIFICANT HIGH RATE EPISODES  OPTI-VOL WITHIN NORMAL LIMITS  NORMAL DEVICE FUNCTION    eb

## 2018-07-11 ENCOUNTER — TELEPHONE (OUTPATIENT)
Dept: ADMINISTRATIVE | Facility: HOSPITAL | Age: 83
End: 2018-07-11

## 2018-07-11 ENCOUNTER — OFFICE VISIT (OUTPATIENT)
Dept: VASCULAR SURGERY | Facility: CLINIC | Age: 83
End: 2018-07-11
Payer: MEDICARE

## 2018-07-11 VITALS
BODY MASS INDEX: 41.75 KG/M2 | DIASTOLIC BLOOD PRESSURE: 78 MMHG | TEMPERATURE: 98.4 F | WEIGHT: 315 LBS | HEIGHT: 73 IN | SYSTOLIC BLOOD PRESSURE: 154 MMHG

## 2018-07-11 DIAGNOSIS — I82.591 CHRONIC DEEP VEIN THROMBOSIS (DVT) OF OTHER VEIN OF RIGHT LOWER EXTREMITY (HCC): ICD-10-CM

## 2018-07-11 DIAGNOSIS — E66.01 MORBID OBESITY (HCC): ICD-10-CM

## 2018-07-11 DIAGNOSIS — L97.319 VENOUS ULCER OF ANKLE, RIGHT (HCC): Primary | ICD-10-CM

## 2018-07-11 DIAGNOSIS — I83.013 VENOUS ULCER OF ANKLE, RIGHT (HCC): Primary | ICD-10-CM

## 2018-07-11 PROBLEM — I82.409 DVT (DEEP VENOUS THROMBOSIS) (HCC): Status: ACTIVE | Noted: 2018-03-22

## 2018-07-11 PROCEDURE — 99203 OFFICE O/P NEW LOW 30 MIN: CPT | Performed by: SURGERY

## 2018-07-11 NOTE — ASSESSMENT & PLAN NOTE
Longstanding venous insufficiency  History of right greater saphenous vein stripping  Patient has recurrence of right venous ankle wound  I reviewed his lower extremity venous Doppler with reflux it demonstrates a significant reflux at a perforated her in the ankle that is communicating directly  Into the wound area  Patient is also significant reflux in his below-knee greater saphenous vein  Patient would benefit from foam sclerotherapy of the  and also stripping and ligation of the greater saphenous vein that is remaining  Risks of the procedure such as bleeding common nonhealing of wound, deep vein thrombosis, recurrence of ulceration, nonhealing of ulceration were discussed  I also reviewed his arterial studies, he has excellent arterial inflow as evidenced by normal triphasic waveforms and normal metatarsal and toe pressures

## 2018-07-11 NOTE — TELEPHONE ENCOUNTER
Spoke with [facility] to see if Dr Sonia Chan can clear patient off of last office visit    Faxed clearance request to [phone]

## 2018-07-11 NOTE — LETTER
July 11, 2018     Brad Ca MD  [address] [address]    Patient: Valarie Barker   YOB: 1935   Date of Visit: 7/11/2018       Dear Dr Tanja Robles: Thank you for referring Jorje Leonard to me for evaluation  Below are my notes for this consultation  If you have questions, please do not hesitate to call me  I look forward to following your patient along with you  Sincerely,        Eugene Mckeon MD        CC: MD Chago Painting LipomaMD Eugene MD  7/11/2018  3:21 PM  Sign at close encounter  Assessment/Plan:    Venous ulcer of ankle, right ([address] )   Longstanding venous insufficiency  History of right greater saphenous vein stripping  Patient has recurrence of right venous ankle wound  I reviewed his lower extremity venous Doppler with reflux it demonstrates a significant reflux at a  in the ankle that is communicating directly  into the wound area  Patient also has significant reflux in his below-knee greater saphenous vein  Patient would benefit from foam sclerotherapy of the  and also stripping and ligation of the greater saphenous vein that is remaining  Explained to the patient it might be difficult to strip the remainder of saphenous vein because of significant scarring from prior stripping procedures  Risks of the procedure such as bleeding common nonhealing of wound, deep vein thrombosis, recurrence of ulceration, nonhealing of ulceration were discussed  I also reviewed his arterial studies, he has excellent arterial inflow as evidenced by normal triphasic waveforms and normal metatarsal and toe pressures  DVT (deep venous thrombosis) (HCC)    History of deep vein thrombosis when he was 28years old  At that time he had a Josue filter placed as he had a pulmonary embolism as well  He has been on Coumadin since then  Morbid obesity ([address] )    Importance of weight loss discussed         Diagnoses and all orders for this visit:    Chronic deep vein thrombosis (DVT) of other vein of right lower extremity (HCC)    Venous ulcer of ankle, right ([facility] )    Morbid obesity ([facility] )               Patient ID: Katja Tomlinson is a 81 y o  male  Chief Complaint: Pt is new to our practice referred by Dr Nasir Wagner  Pt is here to review KARSTEN and evaluate right ankle ulcer  Pt states he has two wounds to right leg that has been ongoing for three month  Pt is going to wound care weekly  Pt is changing dressing every other day  Pt states he has tingling to right foot only when sitting for long periods of times  Pt is taking coumadin  The following portions of the patient's history were reviewed and updated as appropriate: allergies, current medications, past family history, past medical history, past social history, past surgical history and problem list     Review of Systems   Constitutional: Negative  HENT: Negative  Eyes: Negative  Respiratory: Positive for shortness of breath  Cardiovascular: Positive for leg swelling  Gastrointestinal: Negative  Endocrine: Negative  Genitourinary: Negative  Musculoskeletal: Positive for back pain, gait problem and joint swelling  Skin: Positive for wound  Allergic/Immunologic: Negative  Neurological: Negative for dizziness, tremors, seizures, syncope, facial asymmetry, speech difficulty, weakness, light-headedness, numbness and headaches  Hematological: Negative  Psychiatric/Behavioral: Negative  Objective:      /78 (BP Location: Right arm, Patient Position: Sitting, Cuff Size: Adult)   Temp 98 4 °F (36 9 °C) (Tympanic)   Ht 6' 1" (1 854 m)   Wt (!) 157 kg (346 lb)   BMI 45 65 kg/m²           Physical Exam   Constitutional: He appears well-developed and well-nourished  No distress  Cardiovascular: Normal rate, regular rhythm, normal heart sounds and intact distal pulses  Exam reveals no gallop and no friction rub      No murmur heard   + Click - prosthetic valve  Pulmonary/Chest: Effort normal and breath sounds normal  No respiratory distress  He has no wheezes  He has no rales  Abdominal: Soft  Morbid obese   Musculoskeletal: Normal range of motion  He exhibits edema  He exhibits no tenderness  Skin: Skin is warm and dry  He is not diaphoretic  Two separate areas of venous ulceration over the right medial malleolus  There is extensive changes of lipodermatosclerosis and hyperpigmentation   On right leg  The is hyperpigmentation of the left leg  There is C6 venous disease on right leg and C4 venous disease on left leg  Nursing note and vitals reviewed       right leg venous ulcer

## 2018-07-11 NOTE — ASSESSMENT & PLAN NOTE
History of deep vein thrombosis when he was 28years old  At that time he had a Josue filter placed as he had a pulmonary embolism as well  He has been on Coumadin since then

## 2018-07-11 NOTE — PROGRESS NOTES
Assessment/Plan:    Venous ulcer of ankle, right (HCC)   Longstanding venous insufficiency  History of right greater saphenous vein stripping  Patient has recurrence of right venous ankle wound  I reviewed his lower extremity venous Doppler with reflux it demonstrates a significant reflux at a  in the ankle that is communicating directly  into the wound area  Patient also has significant reflux in his below-knee greater saphenous vein  Patient would benefit from foam sclerotherapy of the  and also stripping and ligation of the greater saphenous vein that is remaining  Explained to the patient it might be difficult to strip the remainder of saphenous vein because of significant scarring from prior stripping procedures  Risks of the procedure such as bleeding common nonhealing of wound, deep vein thrombosis, recurrence of ulceration, nonhealing of ulceration were discussed  I also reviewed his arterial studies, he has excellent arterial inflow as evidenced by normal triphasic waveforms and normal metatarsal and toe pressures  DVT (deep venous thrombosis) ([facility])    History of deep vein thrombosis when he was 28years old  At that time he had a Elrod filter placed as he had a pulmonary embolism as well  He has been on Coumadin since then  Morbid obesity ([address] )    Importance of weight loss discussed  Diagnoses and all orders for this visit:    Chronic deep vein thrombosis (DVT) of other vein of right lower extremity (HCC)    Venous ulcer of ankle, right ([address] )    Morbid obesity ([address] )               Patient ID: Nkechi Conteh is a 81 y o  male  Chief Complaint: Pt is new to our practice referred by Dr Deisi Brooks  Pt is here to review KARSTEN and evaluate right ankle ulcer  Pt states he has two wounds to right leg that has been ongoing for three month  Pt is going to wound care weekly  Pt is changing dressing every other day   Pt states he has tingling to right foot only when sitting for long periods of times  Pt is taking coumadin  The following portions of the patient's history were reviewed and updated as appropriate: allergies, current medications, past family history, past medical history, past social history, past surgical history and problem list     Review of Systems   Constitutional: Negative  HENT: Negative  Eyes: Negative  Respiratory: Positive for shortness of breath  Cardiovascular: Positive for leg swelling  Gastrointestinal: Negative  Endocrine: Negative  Genitourinary: Negative  Musculoskeletal: Positive for back pain, gait problem and joint swelling  Skin: Positive for wound  Allergic/Immunologic: Negative  Neurological: Negative for dizziness, tremors, seizures, syncope, facial asymmetry, speech difficulty, weakness, light-headedness, numbness and headaches  Hematological: Negative  Psychiatric/Behavioral: Negative  Objective:      /78 (BP Location: Right arm, Patient Position: Sitting, Cuff Size: Adult)   Temp 98 4 °F (36 9 °C) (Tympanic)   Ht 6' 1" (1 854 m)   Wt (!) 157 kg (346 lb)   BMI 45 65 kg/m²          Physical Exam   Constitutional: He appears well-developed and well-nourished  No distress  Cardiovascular: Normal rate, regular rhythm, normal heart sounds and intact distal pulses  Exam reveals no gallop and no friction rub  No murmur heard   + Click - prosthetic valve  Pulmonary/Chest: Effort normal and breath sounds normal  No respiratory distress  He has no wheezes  He has no rales  Abdominal: Soft  Morbid obese   Musculoskeletal: Normal range of motion  He exhibits edema  He exhibits no tenderness  Skin: Skin is warm and dry  He is not diaphoretic  Two separate areas of venous ulceration over the right medial malleolus  There is extensive changes of lipodermatosclerosis and hyperpigmentation   On right leg  The is hyperpigmentation of the left leg    There is C6 venous disease on right leg and C4 venous disease on left leg  Nursing note and vitals reviewed       right leg venous ulcer

## 2018-07-16 ENCOUNTER — TELEPHONE (OUTPATIENT)
Dept: VASCULAR SURGERY | Facility: CLINIC | Age: 83
End: 2018-07-16

## 2018-07-16 NOTE — TELEPHONE ENCOUNTER
I spoke to pt and confirmed the date of surgery for 8/17/18 with Dr Cris Whitaker at [facility]  Pt said he was told by Dr Cris Whitaker he could do the surgery in the afternoon, but this day Dr Cris Whitaker is only available in the morning  He wanted me to look for a date the Dr Del Wilson be available in the afternoon  I did, and the next date available would be 9/7  Pt decided to stay with 8/17, though he was not happy that Dr Cris Whitaker told him it would be afternoon  I tried to explain the best I could the OR scheduling process  Pt will go for pre admission blood work and EKG ordered  He does not need to stop his Coumadin  NPO after MN  Showering and all other instructions reviewed and understood

## 2018-07-19 ENCOUNTER — TELEPHONE (OUTPATIENT)
Dept: VASCULAR SURGERY | Facility: CLINIC | Age: 83
End: 2018-07-19

## 2018-07-19 NOTE — TELEPHONE ENCOUNTER
Pt called stating yesterday his L lat ankle was itchy and now has developed into a wound 3/4" in diameter  He has two known ulcers med aspect  The new ulcer was burning but is better since he put acticoat on it, which is what he is tx other uclers w/   He is sched for sx 8/17/18 w/ Dr Tahmina Camacho  He is wearing his comp hose  Dr Tahmina Camacho informed of same, pt is already sched for surgery, nothing further to do  He should cont to treat w/ acticoat  Pt notified of same,  req pt call if any additional concerns or questions  Pt verb understanding of same

## 2018-08-01 RX ORDER — MELATONIN
2000 DAILY
COMMUNITY

## 2018-08-01 NOTE — PRE-PROCEDURE INSTRUCTIONS
Pre-Surgery Instructions:   Medication Instructions    acetaminophen (TYLENOL) 325 mg tablet Instructed patient per Anesthesia Guidelines   atorvastatin (LIPITOR) 40 mg tablet Instructed patient per Anesthesia Guidelines   B Complex-C (SUPER B COMPLEX PO) Patient was instructed by Physician and understands   cholecalciferol (VITAMIN D3) 1,000 units tablet Patient was instructed by Physician and understands   Iron Combinations (NIFEREX) TABS Patient was instructed by Physician and understands   meloxicam (MOBIC) 15 mg tablet Patient was instructed by Physician and understands   metFORMIN (GLUCOPHAGE) 1000 MG tablet Patient was instructed by Physician and understands   Multiple Vitamin (MULTIVITAMINS PO) Patient was instructed by Physician and understands   omeprazole (PriLOSEC) 20 mg delayed release capsule Patient was instructed by Physician and understands   potassium chloride (K-DUR,KLOR-CON) 20 mEq tablet Instructed patient per Anesthesia Guidelines   sotalol AF (BETAPACE AF) 80 MG Instructed patient per Anesthesia Guidelines   torsemide (DEMADEX) 20 mg tablet Instructed patient per Anesthesia Guidelines   valsartan (DIOVAN) 160 mg tablet Instructed patient per Anesthesia Guidelines   warfarin (COUMADIN) 4 mg tablet Patient was instructed by Physician and understands  Pre op and bathing instructions reviewed   Pt has hibiclens

## 2018-08-06 ENCOUNTER — ANESTHESIA EVENT (OUTPATIENT)
Dept: PERIOP | Facility: AMBULARY SURGERY CENTER | Age: 83
End: 2018-08-06
Payer: MEDICARE

## 2018-08-08 ENCOUNTER — LAB (OUTPATIENT)
Dept: LAB | Facility: CLINIC | Age: 83
End: 2018-08-08
Payer: MEDICARE

## 2018-08-08 ENCOUNTER — APPOINTMENT (OUTPATIENT)
Dept: LAB | Facility: CLINIC | Age: 83
End: 2018-08-08
Payer: MEDICARE

## 2018-08-08 ENCOUNTER — TRANSCRIBE ORDERS (OUTPATIENT)
Dept: LAB | Facility: CLINIC | Age: 83
End: 2018-08-08

## 2018-08-08 DIAGNOSIS — I83.013 VENOUS ULCER OF ANKLE, RIGHT (HCC): ICD-10-CM

## 2018-08-08 DIAGNOSIS — I82.591 CHRONIC DEEP VEIN THROMBOSIS (DVT) OF OTHER VEIN OF RIGHT LOWER EXTREMITY (HCC): ICD-10-CM

## 2018-08-08 DIAGNOSIS — L97.319 VENOUS ULCER OF ANKLE, RIGHT (HCC): ICD-10-CM

## 2018-08-08 LAB
ANION GAP SERPL CALCULATED.3IONS-SCNC: 11 MMOL/L (ref 4–13)
BUN SERPL-MCNC: 34 MG/DL (ref 5–25)
CALCIUM SERPL-MCNC: 9.3 MG/DL (ref 8.3–10.1)
CHLORIDE SERPL-SCNC: 102 MMOL/L (ref 100–108)
CO2 SERPL-SCNC: 27 MMOL/L (ref 21–32)
CREAT SERPL-MCNC: 1.57 MG/DL (ref 0.6–1.3)
ERYTHROCYTE [DISTWIDTH] IN BLOOD BY AUTOMATED COUNT: 13.9 % (ref 11.6–15.1)
GFR SERPL CREATININE-BSD FRML MDRD: 40 ML/MIN/1.73SQ M
GLUCOSE SERPL-MCNC: 135 MG/DL (ref 65–140)
HCT VFR BLD AUTO: 37.1 % (ref 36.5–49.3)
HGB BLD-MCNC: 12.2 G/DL (ref 12–17)
INR PPP: 3.41 (ref 0.86–1.17)
MCH RBC QN AUTO: 30.3 PG (ref 26.8–34.3)
MCHC RBC AUTO-ENTMCNC: 32.9 G/DL (ref 31.4–37.4)
MCV RBC AUTO: 92 FL (ref 82–98)
PLATELET # BLD AUTO: 222 THOUSANDS/UL (ref 149–390)
PMV BLD AUTO: 10 FL (ref 8.9–12.7)
POTASSIUM SERPL-SCNC: 4.4 MMOL/L (ref 3.5–5.3)
PROTHROMBIN TIME: 33.4 SECONDS (ref 11.8–14.2)
RBC # BLD AUTO: 4.03 MILLION/UL (ref 3.88–5.62)
SODIUM SERPL-SCNC: 140 MMOL/L (ref 136–145)
WBC # BLD AUTO: 6.98 THOUSAND/UL (ref 4.31–10.16)

## 2018-08-08 PROCEDURE — 93005 ELECTROCARDIOGRAM TRACING: CPT

## 2018-08-08 PROCEDURE — 80048 BASIC METABOLIC PNL TOTAL CA: CPT

## 2018-08-08 PROCEDURE — 85610 PROTHROMBIN TIME: CPT

## 2018-08-08 PROCEDURE — 85027 COMPLETE CBC AUTOMATED: CPT

## 2018-08-08 PROCEDURE — 36415 COLL VENOUS BLD VENIPUNCTURE: CPT

## 2018-08-09 LAB
ATRIAL RATE: 61 BPM
P AXIS: 41 DEGREES
PR INTERVAL: 278 MS
QRS AXIS: 28 DEGREES
QRSD INTERVAL: 176 MS
QT INTERVAL: 514 MS
QTC INTERVAL: 517 MS
T WAVE AXIS: 74 DEGREES
VENTRICULAR RATE: 61 BPM

## 2018-08-09 PROCEDURE — 93010 ELECTROCARDIOGRAM REPORT: CPT | Performed by: INTERNAL MEDICINE

## 2018-08-11 ENCOUNTER — HOSPITAL ENCOUNTER (EMERGENCY)
Facility: HOSPITAL | Age: 83
Discharge: HOME | End: 2018-08-11
Attending: EMERGENCY MEDICINE
Payer: MEDICARE

## 2018-08-11 ENCOUNTER — APPOINTMENT (EMERGENCY)
Dept: ULTRASOUND IMAGING | Facility: HOSPITAL | Age: 83
End: 2018-08-11
Payer: MEDICARE

## 2018-08-11 VITALS
TEMPERATURE: 98.7 F | RESPIRATION RATE: 18 BRPM | OXYGEN SATURATION: 98 % | DIASTOLIC BLOOD PRESSURE: 64 MMHG | BODY MASS INDEX: 46.28 KG/M2 | WEIGHT: 315 LBS | SYSTOLIC BLOOD PRESSURE: 129 MMHG | HEART RATE: 60 BPM

## 2018-08-11 DIAGNOSIS — M79.606 LEG PAIN: Primary | ICD-10-CM

## 2018-08-11 LAB
ANION GAP SERPL CALCULATED.3IONS-SCNC: 9 MMOL/L (ref 4–13)
APTT PPP: 49 SECONDS (ref 24–36)
BASOPHILS # BLD AUTO: 0.02 THOUSANDS/ΜL (ref 0–0.1)
BASOPHILS NFR BLD AUTO: 0 % (ref 0–1)
BUN SERPL-MCNC: 34 MG/DL (ref 5–25)
CALCIUM SERPL-MCNC: 8.7 MG/DL (ref 8.3–10.1)
CHLORIDE SERPL-SCNC: 106 MMOL/L (ref 100–108)
CO2 SERPL-SCNC: 27 MMOL/L (ref 21–32)
CREAT SERPL-MCNC: 1.56 MG/DL (ref 0.6–1.3)
EOSINOPHIL # BLD AUTO: 0.17 THOUSAND/ΜL (ref 0–0.61)
EOSINOPHIL NFR BLD AUTO: 4 % (ref 0–6)
ERYTHROCYTE [DISTWIDTH] IN BLOOD BY AUTOMATED COUNT: 13.9 % (ref 11.6–15.1)
GFR SERPL CREATININE-BSD FRML MDRD: 41 ML/MIN/1.73SQ M
GLUCOSE SERPL-MCNC: 163 MG/DL (ref 65–140)
HCT VFR BLD AUTO: 34.8 % (ref 36.5–49.3)
HGB BLD-MCNC: 11.1 G/DL (ref 12–17)
IMM GRANULOCYTES # BLD AUTO: 0.01 THOUSAND/UL (ref 0–0.2)
IMM GRANULOCYTES NFR BLD AUTO: 0 % (ref 0–2)
INR PPP: 3.02 (ref 0.86–1.17)
LYMPHOCYTES # BLD AUTO: 1.02 THOUSANDS/ΜL (ref 0.6–4.47)
LYMPHOCYTES NFR BLD AUTO: 23 % (ref 14–44)
MCH RBC QN AUTO: 29.7 PG (ref 26.8–34.3)
MCHC RBC AUTO-ENTMCNC: 31.9 G/DL (ref 31.4–37.4)
MCV RBC AUTO: 93 FL (ref 82–98)
MONOCYTES # BLD AUTO: 0.49 THOUSAND/ΜL (ref 0.17–1.22)
MONOCYTES NFR BLD AUTO: 11 % (ref 4–12)
NEUTROPHILS # BLD AUTO: 2.8 THOUSANDS/ΜL (ref 1.85–7.62)
NEUTS SEG NFR BLD AUTO: 62 % (ref 43–75)
NRBC BLD AUTO-RTO: 0 /100 WBCS
PLATELET # BLD AUTO: 194 THOUSANDS/UL (ref 149–390)
PMV BLD AUTO: 9.8 FL (ref 8.9–12.7)
POTASSIUM SERPL-SCNC: 4 MMOL/L (ref 3.5–5.3)
PROTHROMBIN TIME: 30.4 SECONDS (ref 11.8–14.2)
RBC # BLD AUTO: 3.74 MILLION/UL (ref 3.88–5.62)
SODIUM SERPL-SCNC: 142 MMOL/L (ref 136–145)
WBC # BLD AUTO: 4.51 THOUSAND/UL (ref 4.31–10.16)

## 2018-08-11 PROCEDURE — 80048 BASIC METABOLIC PNL TOTAL CA: CPT | Performed by: EMERGENCY MEDICINE

## 2018-08-11 PROCEDURE — 93971 EXTREMITY STUDY: CPT

## 2018-08-11 PROCEDURE — 36415 COLL VENOUS BLD VENIPUNCTURE: CPT | Performed by: EMERGENCY MEDICINE

## 2018-08-11 PROCEDURE — 85730 THROMBOPLASTIN TIME PARTIAL: CPT | Performed by: EMERGENCY MEDICINE

## 2018-08-11 PROCEDURE — 99284 EMERGENCY DEPT VISIT MOD MDM: CPT

## 2018-08-11 PROCEDURE — 85025 COMPLETE CBC W/AUTO DIFF WBC: CPT | Performed by: EMERGENCY MEDICINE

## 2018-08-11 PROCEDURE — 85610 PROTHROMBIN TIME: CPT | Performed by: EMERGENCY MEDICINE

## 2018-08-11 NOTE — DISCHARGE INSTRUCTIONS
Leg Pain   WHAT YOU NEED TO KNOW:   Leg pain may be caused by a variety of health conditions  Your tests did not show any broken bones or blood clots  DISCHARGE INSTRUCTIONS:   Return to the emergency department if:   · You have a fever  · Your leg starts to swell  · Your leg pain gets worse  · You have numbness or tingling in your leg or toes  · You cannot put any weight on or move your leg  Contact your healthcare provider if:   · Your pain does not decrease, even after treatment  · You have questions or concerns about your condition or care  Medicines:   · NSAIDs , such as ibuprofen, help decrease swelling, pain, and fever  This medicine is available with or without a doctor's order  NSAIDs can cause stomach bleeding or kidney problems in certain people  If you take blood thinner medicine, always ask your healthcare provider if NSAIDs are safe for you  Always read the medicine label and follow directions  · Take your medicine as directed  Contact your healthcare provider if you think your medicine is not helping or if you have side effects  Tell him of her if you are allergic to any medicine  Keep a list of the medicines, vitamins, and herbs you take  Include the amounts, and when and why you take them  Bring the list or the pill bottles to follow-up visits  Carry your medicine list with you in case of an emergency  Follow up with your healthcare provider as directed: You may need more tests to find the cause of your leg pain  You may need to see an orthopedic specialist or a physical therapist  Write down your questions so you remember to ask them during your visits  Manage your leg pain:   · Rest  your injured leg so that it can heal  You may need an immobilizer, brace, or splint to limit the movement of your leg  You may need to avoid putting any weight on your leg for at least 48 hours  Return to normal activities as directed      · Ice  the injury for 20 minutes every 4 hours for up to 24 hours, or as directed  Use an ice pack, or put crushed ice in a plastic bag  Cover it with a towel to protect your skin  Ice helps prevent tissue damage and decreases swelling and pain  · Elevate  your injured leg above the level of your heart as often as you can  This will help decrease swelling and pain  If possible, prop your leg on pillows or blankets to keep the area elevated comfortably  · Use assistive devices as directed  You may need to use a cane or crutches  Assistive devices help decrease pain and pressure on your leg when you walk  Ask your healthcare provider for more information about assistive devices and how to use them correctly  · Maintain a healthy weight  Extra body weight can cause pressure and pain in your hip, knee, and ankle joints  Ask your healthcare provider how much you should weigh  Ask him to help you create a weight loss plan if you are overweight  © 2017 [employer] Information is for End User's use only and may not be sold, redistributed or otherwise used for commercial purposes  All illustrations and images included in CareNotes® are the copyrighted property of [employer] , [employer]  or Davi Meza  The above information is an  only  It is not intended as medical advice for individual conditions or treatments  Talk to your doctor, nurse or pharmacist before following any medical regimen to see if it is safe and effective for you

## 2018-08-11 NOTE — ED NOTES
Made aware from Nan that pt will have to wait for US due to OP tests        Maximus Biggs RN  08/11/18 0634

## 2018-08-11 NOTE — ED PROVIDER NOTES
History  Chief Complaint   Patient presents with    Leg Swelling     Pt rpoerts ongoing R lower extremity swelling  pt has surg scheduled for venous "stripping" friday  Pt has non healing wounds on same foot  Pt comes intoday due to new redness, tenderness, and swelling on R calf  52-year-old male comes in complaining of leg pain patient has a known history of nonhealing wounds on the same foot and is set up for vascular surgery this Friday  Patient states leg has become more painful and swollen over the last 24 hours  Patient is concerned that they had is a blood clot in there  Patient wants to make sure everything is okay so that he can have surgery on Friday as scheduled  History provided by:  Patient   used: No    Leg Pain   Location:  Leg  Injury: no    Leg location:  R lower leg  Pain details:     Quality:  Pressure, shooting and throbbing    Radiates to:  Does not radiate    Severity:  Moderate    Onset quality:  Sudden    Duration:  1 day    Timing:  Constant    Progression:  Worsening  Chronicity:  Recurrent  Dislocation: no    Foreign body present:  No foreign bodies  Tetanus status:  Up to date  Prior injury to area:  No  Ineffective treatments:  None tried  Associated symptoms: swelling    Associated symptoms: no back pain and no fever    Swelling:     Location:  Leg    Onset quality:  Sudden    Duration:  1 day    Timing:  Constant    Progression:  Worsening    Chronicity:  New  Risk factors: obesity    Risk factors: no concern for non-accidental trauma        Prior to Admission Medications   Prescriptions Last Dose Informant Patient Reported? Taking?    B Complex-C (SUPER B COMPLEX PO)   Yes No   Sig: Take by mouth   Iron Combinations (NIFEREX) TABS  Self Yes No   Sig: TK 1 T PO BID   Multiple Vitamin (MULTIVITAMINS PO)  Self Yes No   Sig: Take 1 tablet by mouth daily   acetaminophen (TYLENOL) 325 mg tablet  Self No No   Sig: Take 2 tablets by mouth every 6 (six) hours as needed for mild pain or fever   atorvastatin (LIPITOR) 40 mg tablet  Self Yes No   Sig: Take 40 mg by mouth daily after dinner Atorvastatin Calcium 40 MG Oral Tablet Take 1 tablet daily  Refills: 0  Active    cholecalciferol (VITAMIN D3) 1,000 units tablet   Yes No   Sig: Take 2,000 Units by mouth daily   meloxicam (MOBIC) 15 mg tablet  Self Yes No   Sig: Take by mouth   metFORMIN (GLUCOPHAGE) 1000 MG tablet  Self Yes No   Sig: Take 1,000 mg by mouth daily after dinner MetFORMIN HCl 1000 MG (MOD) TB24 Take one tablet daily  Refills: 0  Active    omeprazole (PriLOSEC) 20 mg delayed release capsule  Self Yes No   Sig: Omeprazole 20 MG Oral Tablet Delayed Release Take 1 tablet daily  Refills: 0  Active   potassium chloride (K-DUR,KLOR-CON) 20 mEq tablet  Self Yes No   Sig: Take 20 mEq by mouth daily after dinner Potassium Chloride Jesusita ER 20 MEQ Oral Tablet Extended Release Take 1 tablet daily  Refills: 0  Active    sotalol AF (BETAPACE AF) 80 MG  Self Yes No   Si mg 2 (two) times a day  Sotalol HCl - 80 MG Oral Tablet TAKE 1 TABLET EVERY 12 HOURS DAILY    Refills: 0  Active    torsemide (DEMADEX) 20 mg tablet  Self No No   Sig: TAKE 1 TABLET(20 MG) BY MOUTH TWICE DAILY   Patient taking differently: TAKE 1 TABLET(20 MG) BY MOUTH  once daily   valsartan (DIOVAN) 160 mg tablet   No No   Sig: Take 1 tablet (160 mg total) by mouth daily   warfarin (COUMADIN) 4 mg tablet   No No   Sig: Take 1 tablet by mouth 2 (two) times a week for 30 days Sat/Wed      Facility-Administered Medications: None       Past Medical History:   Diagnosis Date    Anemia     Atrial fibrillation (HCC)     CHF (congestive heart failure) (HCC)     Diabetes mellitus ([facility] )     Niddm    DVT (deep vein thrombosis) in pregnancy ([facility] )     not in pregnancy    DVT (deep venous thrombosis) ([place] [address] )     GERD (gastroesophageal reflux disease)     Hyperlipidemia     Hypertension     Irregular heart beat     Afib    Sepsis ([place] [address] ) Past Surgical History:   Procedure Laterality Date    AORTIC VALVE REPLACEMENT      CARDIAC DEFIBRILLATOR PLACEMENT  04/2014   Ruby Lisa CARDIAC SURGERY  02/2014    AVR    COLONOSCOPY      INSERT / REPLACE / REMOVE PACEMAKER      JOINT REPLACEMENT Left     LTKR    TOTAL KNEE ARTHROPLASTY Left     VASCULAR SURGERY      WISDOM TOOTH EXTRACTION         Family History   Problem Relation Age of Onset    Arthritis Mother     Arthritis Father      I have reviewed and agree with the history as documented  Social History   Substance Use Topics    Smoking status: Never Smoker    Smokeless tobacco: Never Used    Alcohol use No        Review of Systems   Constitutional: Negative for activity change, appetite change and fever  HENT: Negative for congestion and facial swelling  Eyes: Negative for discharge and redness  Respiratory: Negative for cough and wheezing  Cardiovascular: Negative for chest pain and leg swelling  Gastrointestinal: Negative for abdominal distention, abdominal pain and blood in stool  Endocrine: Negative for cold intolerance and polydipsia  Genitourinary: Negative for difficulty urinating and hematuria  Musculoskeletal: Negative for arthralgias, back pain and gait problem  Skin: Negative for color change and rash  Allergic/Immunologic: Negative for food allergies and immunocompromised state  Neurological: Negative for dizziness, seizures and headaches  Hematological: Negative for adenopathy  Does not bruise/bleed easily  Psychiatric/Behavioral: Negative for agitation, confusion and decreased concentration  All other systems reviewed and are negative  Physical Exam  Physical Exam   Constitutional: He is oriented to person, place, and time  He appears well-developed and well-nourished  Non-toxic appearance  HENT:   Head: Normocephalic and atraumatic     Right Ear: Tympanic membrane normal    Left Ear: Tympanic membrane normal    Nose: Nose normal  Mouth/Throat: Oropharynx is clear and moist    Eyes: Conjunctivae, EOM and lids are normal  Pupils are equal, round, and reactive to light  Neck: Trachea normal and normal range of motion  Neck supple  No JVD present  Carotid bruit is not present  Cardiovascular: Normal rate, regular rhythm, normal heart sounds and intact distal pulses  No extrasystoles are present  Pulmonary/Chest: Effort normal  He has no decreased breath sounds  He has no wheezes  He has no rhonchi  He has no rales  He exhibits no tenderness and no deformity  Abdominal: Soft  Bowel sounds are normal  There is no tenderness  There is no rebound, no guarding and no CVA tenderness  Musculoskeletal:        Right shoulder: He exhibits normal range of motion, no tenderness, no swelling and no deformity  Cervical back: Normal  He exhibits normal range of motion, no tenderness, no bony tenderness and no deformity  Lymphadenopathy:     He has no cervical adenopathy  He has no axillary adenopathy  Neurological: He is alert and oriented to person, place, and time  He has normal strength and normal reflexes  No cranial nerve deficit or sensory deficit  He displays a negative Romberg sign  Skin: Skin is warm and dry  Psychiatric: He has a normal mood and affect  His speech is normal and behavior is normal  Judgment and thought content normal  Cognition and memory are normal    Nursing note and vitals reviewed        Vital Signs  ED Triage Vitals [08/11/18 0944]   Temperature Pulse Respirations Blood Pressure SpO2   98 7 °F (37 1 °C) 72 18 (!) 177/78 97 %      Temp Source Heart Rate Source Patient Position - Orthostatic VS BP Location FiO2 (%)   Oral Monitor Lying Right arm --      Pain Score       2           Vitals:    08/11/18 0944 08/11/18 1145   BP: (!) 177/78 129/64   Pulse: 72 60   Patient Position - Orthostatic VS: Lying Lying       Visual Acuity      ED Medications  Medications - No data to display    Diagnostic Studies  Results Reviewed     Procedure Component Value Units Date/Time    Protime-INR [[id number]]  (Abnormal) Collected:  08/11/18 1020    Lab Status:  Final result Specimen:  Blood from Arm, Right Updated:  08/11/18 1047     Protime 30 4 (H) seconds      INR 3 02 (H)    APTT [[id number]]  (Abnormal) Collected:  08/11/18 1020    Lab Status:  Final result Specimen:  Blood from Arm, Right Updated:  08/11/18 1047     PTT 49 (H) seconds     Basic metabolic panel [[id number]]  (Abnormal) Collected:  08/11/18 1020    Lab Status:  Final result Specimen:  Blood from Arm, Right Updated:  08/11/18 1037     Sodium 142 mmol/L      Potassium 4 0 mmol/L      Chloride 106 mmol/L      CO2 27 mmol/L      Anion Gap 9 mmol/L      BUN 34 (H) mg/dL      Creatinine 1 56 (H) mg/dL      Glucose 163 (H) mg/dL      Calcium 8 7 mg/dL      eGFR 41 ml/min/1 73sq m     Narrative:         National Kidney Disease Education Program recommendations are as follows:  GFR calculation is accurate only with a steady state creatinine  Chronic Kidney disease less than 60 ml/min/1 73 sq  meters  Kidney failure less than 15 ml/min/1 73 sq  meters      CBC and differential [[id number]]  (Abnormal) Collected:  08/11/18 1020    Lab Status:  Final result Specimen:  Blood from Arm, Right Updated:  08/11/18 1028     WBC 4 51 Thousand/uL      RBC 3 74 (L) Million/uL      Hemoglobin 11 1 (L) g/dL      Hematocrit 34 8 (L) %      MCV 93 fL      MCH 29 7 pg      MCHC 31 9 g/dL      RDW 13 9 %      MPV 9 8 fL      Platelets 123 Thousands/uL      nRBC 0 /100 WBCs      Neutrophils Relative 62 %      Immat GRANS % 0 %      Lymphocytes Relative 23 %      Monocytes Relative 11 %      Eosinophils Relative 4 %      Basophils Relative 0 %      Neutrophils Absolute 2 80 Thousands/µL      Immature Grans Absolute 0 01 Thousand/uL      Lymphocytes Absolute 1 02 Thousands/µL      Monocytes Absolute 0 49 Thousand/µL      Eosinophils Absolute 0 17 Thousand/µL      Basophils Absolute 0 02 Thousands/µL                  VAS lower limb venous duplex study, unilateral/limited    (Results Pending)              Procedures  Procedures       Phone Contacts  ED Phone Contact    ED Course                               MDM  Number of Diagnoses or Management Options  Leg pain: established and worsening     Amount and/or Complexity of Data Reviewed  Clinical lab tests: ordered and reviewed  Tests in the radiology section of CPT®: ordered  Tests in the medicine section of CPT®: ordered and reviewed  Independent visualization of images, tracings, or specimens: yes    Patient Progress  Patient progress: stable    CritCare Time    Disposition  Final diagnoses:   Leg pain     Time reflects when diagnosis was documented in both MDM as applicable and the Disposition within this note     Time User Action Codes Description Comment    8/11/2018 12:23 PM Carmel Seals Add [M79 606] Leg pain       ED Disposition     ED Disposition Condition Comment    Discharge  Daniele Corona discharge to home/self care  Condition at discharge: Good        Follow-up Information     Follow up With Specialties Details Why Carlos Smith MD Internal Medicine Schedule an appointment as soon as possible for a visit As needed [address] [place] [address] [facility] [address] [facility]  [phone]            Patient's Medications   Discharge Prescriptions    No medications on file     No discharge procedures on file      ED Provider  Electronically Signed by           Ervin Bryan DO  08/11/18 8252

## 2018-08-12 PROCEDURE — 93971 EXTREMITY STUDY: CPT | Performed by: SURGERY

## 2018-08-14 ENCOUNTER — TELEPHONE (OUTPATIENT)
Dept: VASCULAR SURGERY | Facility: CLINIC | Age: 83
End: 2018-08-14

## 2018-08-14 NOTE — TELEPHONE ENCOUNTER
Pt is on OR sched for Friday for Dr Taylor Aguirre  He called to report he went to er 8/11  due to severe pain in R calf area and had lev Doppler done which was negative for dvt  He said he has area on calf near old incision which is very painful, it is red  He was concerned he had an infection but was told he did not  He is wearing comp hose  He said he wants to make sure there will be no problem proceeding w/ surgery Friday  Dr Taylor Aguirre notified of above, per Dr Taylor Aguirre as long as there was not finding of dvt ok to proceed as planned  Pt notified of same, req he call w /any further questions or concerns

## 2018-08-17 ENCOUNTER — HOSPITAL ENCOUNTER (OUTPATIENT)
Facility: AMBULARY SURGERY CENTER | Age: 83
Setting detail: OUTPATIENT SURGERY
Discharge: HOME | End: 2018-08-17
Attending: SURGERY | Admitting: SURGERY
Payer: MEDICARE

## 2018-08-17 ENCOUNTER — HOSPITAL ENCOUNTER (OUTPATIENT)
Dept: ULTRASOUND IMAGING | Facility: AMBULARY SURGERY CENTER | Age: 83
Setting detail: OUTPATIENT SURGERY
Discharge: HOME | End: 2018-08-17
Payer: MEDICARE

## 2018-08-17 ENCOUNTER — ANESTHESIA (OUTPATIENT)
Dept: PERIOP | Facility: AMBULARY SURGERY CENTER | Age: 83
End: 2018-08-17
Payer: MEDICARE

## 2018-08-17 VITALS
SYSTOLIC BLOOD PRESSURE: 181 MMHG | HEIGHT: 73 IN | DIASTOLIC BLOOD PRESSURE: 77 MMHG | BODY MASS INDEX: 41.75 KG/M2 | RESPIRATION RATE: 18 BRPM | TEMPERATURE: 96.9 F | OXYGEN SATURATION: 99 % | HEART RATE: 63 BPM | WEIGHT: 315 LBS

## 2018-08-17 DIAGNOSIS — I83.90 VARICOSE VEINS OF LOWER EXTREMITY: ICD-10-CM

## 2018-08-17 DIAGNOSIS — L97.319 VENOUS ULCER OF ANKLE, RIGHT (HCC): Primary | ICD-10-CM

## 2018-08-17 DIAGNOSIS — I83.013 VENOUS ULCER OF ANKLE, RIGHT (HCC): Primary | ICD-10-CM

## 2018-08-17 PROCEDURE — 36471 NJX SCLRSNT MLT INCMPTNT VN: CPT | Performed by: SURGERY

## 2018-08-17 PROCEDURE — 93971 EXTREMITY STUDY: CPT

## 2018-08-17 PROCEDURE — 76942 ECHO GUIDE FOR BIOPSY: CPT | Performed by: SURGERY

## 2018-08-17 RX ORDER — MAGNESIUM HYDROXIDE 1200 MG/15ML
LIQUID ORAL AS NEEDED
Status: DISCONTINUED | OUTPATIENT
Start: 2018-08-17 | End: 2018-08-17 | Stop reason: HOSPADM

## 2018-08-17 RX ORDER — SODIUM CHLORIDE 9 MG/ML
INJECTION, SOLUTION INTRAVENOUS CONTINUOUS PRN
Status: DISCONTINUED | OUTPATIENT
Start: 2018-08-17 | End: 2018-08-17 | Stop reason: SURG

## 2018-08-17 RX ORDER — HYDROCODONE BITARTRATE AND ACETAMINOPHEN 5; 325 MG/1; MG/1
1 TABLET ORAL EVERY 6 HOURS PRN
Status: DISCONTINUED | OUTPATIENT
Start: 2018-08-17 | End: 2018-08-17 | Stop reason: HOSPADM

## 2018-08-17 RX ORDER — LIDOCAINE HYDROCHLORIDE 10 MG/ML
INJECTION, SOLUTION INFILTRATION; PERINEURAL AS NEEDED
Status: DISCONTINUED | OUTPATIENT
Start: 2018-08-17 | End: 2018-08-17 | Stop reason: SURG

## 2018-08-17 RX ORDER — PROPOFOL 10 MG/ML
INJECTION, EMULSION INTRAVENOUS AS NEEDED
Status: DISCONTINUED | OUTPATIENT
Start: 2018-08-17 | End: 2018-08-17 | Stop reason: SURG

## 2018-08-17 RX ORDER — ONDANSETRON 2 MG/ML
INJECTION INTRAMUSCULAR; INTRAVENOUS AS NEEDED
Status: DISCONTINUED | OUTPATIENT
Start: 2018-08-17 | End: 2018-08-17 | Stop reason: SURG

## 2018-08-17 RX ORDER — SODIUM CHLORIDE 9 MG/ML
50 INJECTION, SOLUTION INTRAVENOUS CONTINUOUS
Status: DISCONTINUED | OUTPATIENT
Start: 2018-08-17 | End: 2018-08-17 | Stop reason: HOSPADM

## 2018-08-17 RX ORDER — ONDANSETRON 2 MG/ML
4 INJECTION INTRAMUSCULAR; INTRAVENOUS ONCE AS NEEDED
Status: DISCONTINUED | OUTPATIENT
Start: 2018-08-17 | End: 2018-08-17 | Stop reason: HOSPADM

## 2018-08-17 RX ORDER — FENTANYL CITRATE 50 UG/ML
INJECTION, SOLUTION INTRAMUSCULAR; INTRAVENOUS AS NEEDED
Status: DISCONTINUED | OUTPATIENT
Start: 2018-08-17 | End: 2018-08-17 | Stop reason: SURG

## 2018-08-17 RX ORDER — HYDROCODONE BITARTRATE AND ACETAMINOPHEN 5; 325 MG/1; MG/1
1 TABLET ORAL EVERY 6 HOURS PRN
Qty: 20 TABLET | Refills: 0 | Status: SHIPPED | OUTPATIENT
Start: 2018-08-17 | End: 2018-08-27

## 2018-08-17 RX ORDER — FENTANYL CITRATE/PF 50 MCG/ML
25 SYRINGE (ML) INJECTION
Status: DISCONTINUED | OUTPATIENT
Start: 2018-08-17 | End: 2018-08-17 | Stop reason: HOSPADM

## 2018-08-17 RX ADMIN — ONDANSETRON 4 MG: 2 INJECTION INTRAMUSCULAR; INTRAVENOUS at 11:52

## 2018-08-17 RX ADMIN — FENTANYL CITRATE 25 MCG: 50 INJECTION, SOLUTION INTRAMUSCULAR; INTRAVENOUS at 12:55

## 2018-08-17 RX ADMIN — FENTANYL CITRATE 25 MCG: 50 INJECTION, SOLUTION INTRAMUSCULAR; INTRAVENOUS at 10:46

## 2018-08-17 RX ADMIN — FENTANYL CITRATE 25 MCG: 50 INJECTION, SOLUTION INTRAMUSCULAR; INTRAVENOUS at 11:32

## 2018-08-17 RX ADMIN — FENTANYL CITRATE 25 MCG: 50 INJECTION, SOLUTION INTRAMUSCULAR; INTRAVENOUS at 12:49

## 2018-08-17 RX ADMIN — LIDOCAINE HYDROCHLORIDE 50 MG: 10 INJECTION, SOLUTION INFILTRATION; PERINEURAL at 10:43

## 2018-08-17 RX ADMIN — CEFAZOLIN SODIUM 3000 MG: 2 SOLUTION INTRAVENOUS at 10:50

## 2018-08-17 RX ADMIN — PROPOFOL 200 MG: 10 INJECTION, EMULSION INTRAVENOUS at 10:43

## 2018-08-17 RX ADMIN — SODIUM CHLORIDE: 0.9 INJECTION, SOLUTION INTRAVENOUS at 10:37

## 2018-08-17 RX ADMIN — DEXAMETHASONE SODIUM PHOSPHATE 5 MG: 10 INJECTION INTRAMUSCULAR; INTRAVENOUS at 10:55

## 2018-08-17 RX ADMIN — HYDROCODONE BITARTRATE AND ACETAMINOPHEN 1 TABLET: 5; 325 TABLET ORAL at 13:30

## 2018-08-17 RX ADMIN — FENTANYL CITRATE 25 MCG: 50 INJECTION, SOLUTION INTRAMUSCULAR; INTRAVENOUS at 12:36

## 2018-08-17 NOTE — DISCHARGE INSTRUCTIONS
DISCHARGE INSTRUCTIONS  VARICOSE VEIN SURGERY    1) When released from the hospital, you should have a compression bandage in place on the operated leg  This bandage should feel snug but not too tight  If the bandage becomes blood soaked or painfully tight, elevate your leg and call your surgeon immediately  2) If the operated leg becomes increasingly painful or swollen, or if there is increasing redness or pain around your incision, contact our office  3) On the day of your operation, take it easy and elevate your leg as much as possible  You can take short walks around the house  When sitting, the leg should be elevated  The preferred position is to have the leg at or above the level of the heart  Starting on the first day after surgery, light walking is strongly encouraged as tolerated  After your ultrasound test, you can resume your normal activity, but no heavy lifting or strenuous exercise for 2 weeks  4) Some bruising of the skin is common after varicose vein surgery  This can be lessened by strict elevation of the leg  Many patients will notice some numbness of the shin, ankle, calf, or the top of the foot  This usually fades with time, but may be persistent  After surgery you can expect bruising, swelling and hard knots on your leg  As your body heals the bruising will fade and the swelling and knots will subside  5) Observe incisions daily  Report to our office any of the following:  a) Any areas that are red and angry in appearance  b) Any drainage that is milky or cloudy in appearance or that has a foul odor  c) Elevated temperature of 100 5 degrees F or greater  6) Apply sunscreen with SPF 30 to incisions while sun bathing for up to one year after surgery to reduce the chances of your incisions darkening  7) Your first post-operative appointment will be 2-3 days after your surgery   At this appointment your bandages will be removed and you will be seen by a Vascular Surgeon, Nurse Practicioner, or Physician Assistant  An appointment for a follow up Doppler study will be scheduled 5-7 days after surgery  8)         If have any questions, please call our office at ([phone])

## 2018-08-17 NOTE — ANESTHESIA POSTPROCEDURE EVALUATION
Post-Op Assessment Note      CV Status:  Stable    Mental Status:  Alert and awake    Hydration Status:  Euvolemic    PONV Controlled:  Controlled    Airway Patency:  Patent    Post Op Vitals Reviewed: Yes          Staff: Anesthesiologist, CRNA           BP  172/78   Temp 97 5 °F (36 4 °C) (08/17/18 1205)    Pulse  61   Resp   16   SpO2   100%

## 2018-08-17 NOTE — H&P (VIEW-ONLY)
History  Chief Complaint   Patient presents with    Leg Swelling     Pt rpoerts ongoing R lower extremity swelling  pt has surg scheduled for venous "stripping" friday  Pt has non healing wounds on same foot  Pt comes intoday due to new redness, tenderness, and swelling on R calf  81-year-old male comes in complaining of leg pain patient has a known history of nonhealing wounds on the same foot and is set up for vascular surgery this Friday  Patient states leg has become more painful and swollen over the last 24 hours  Patient is concerned that they had is a blood clot in there  Patient wants to make sure everything is okay so that he can have surgery on Friday as scheduled  History provided by:  Patient   used: No    Leg Pain   Location:  Leg  Injury: no    Leg location:  R lower leg  Pain details:     Quality:  Pressure, shooting and throbbing    Radiates to:  Does not radiate    Severity:  Moderate    Onset quality:  Sudden    Duration:  1 day    Timing:  Constant    Progression:  Worsening  Chronicity:  Recurrent  Dislocation: no    Foreign body present:  No foreign bodies  Tetanus status:  Up to date  Prior injury to area:  No  Ineffective treatments:  None tried  Associated symptoms: swelling    Associated symptoms: no back pain and no fever    Swelling:     Location:  Leg    Onset quality:  Sudden    Duration:  1 day    Timing:  Constant    Progression:  Worsening    Chronicity:  New  Risk factors: obesity    Risk factors: no concern for non-accidental trauma        Prior to Admission Medications   Prescriptions Last Dose Informant Patient Reported? Taking?    B Complex-C (SUPER B COMPLEX PO)   Yes No   Sig: Take by mouth   Iron Combinations (NIFEREX) TABS  Self Yes No   Sig: TK 1 T PO BID   Multiple Vitamin (MULTIVITAMINS PO)  Self Yes No   Sig: Take 1 tablet by mouth daily   acetaminophen (TYLENOL) 325 mg tablet  Self No No   Sig: Take 2 tablets by mouth every 6 (six) hours as needed for mild pain or fever   atorvastatin (LIPITOR) 40 mg tablet  Self Yes No   Sig: Take 40 mg by mouth daily after dinner Atorvastatin Calcium 40 MG Oral Tablet Take 1 tablet daily  Refills: 0  Active    cholecalciferol (VITAMIN D3) 1,000 units tablet   Yes No   Sig: Take 2,000 Units by mouth daily   meloxicam (MOBIC) 15 mg tablet  Self Yes No   Sig: Take by mouth   metFORMIN (GLUCOPHAGE) 1000 MG tablet  Self Yes No   Sig: Take 1,000 mg by mouth daily after dinner MetFORMIN HCl 1000 MG (MOD) TB24 Take one tablet daily  Refills: 0  Active    omeprazole (PriLOSEC) 20 mg delayed release capsule  Self Yes No   Sig: Omeprazole 20 MG Oral Tablet Delayed Release Take 1 tablet daily  Refills: 0  Active   potassium chloride (K-DUR,KLOR-CON) 20 mEq tablet  Self Yes No   Sig: Take 20 mEq by mouth daily after dinner Potassium Chloride Jesusita ER 20 MEQ Oral Tablet Extended Release Take 1 tablet daily  Refills: 0  Active    sotalol AF (BETAPACE AF) 80 MG  Self Yes No   Si mg 2 (two) times a day  Sotalol HCl - 80 MG Oral Tablet TAKE 1 TABLET EVERY 12 HOURS DAILY    Refills: 0  Active    torsemide (DEMADEX) 20 mg tablet  Self No No   Sig: TAKE 1 TABLET(20 MG) BY MOUTH TWICE DAILY   Patient taking differently: TAKE 1 TABLET(20 MG) BY MOUTH  once daily   valsartan (DIOVAN) 160 mg tablet   No No   Sig: Take 1 tablet (160 mg total) by mouth daily   warfarin (COUMADIN) 4 mg tablet   No No   Sig: Take 1 tablet by mouth 2 (two) times a week for 30 days Sat/Wed      Facility-Administered Medications: None       Past Medical History:   Diagnosis Date    Anemia     Atrial fibrillation (HCC)     CHF (congestive heart failure) (HCC)     Diabetes mellitus ([facility] )     Niddm    DVT (deep vein thrombosis) in pregnancy ([facility] )     not in pregnancy    DVT (deep venous thrombosis) ([place] [address] )     GERD (gastroesophageal reflux disease)     Hyperlipidemia     Hypertension     Irregular heart beat     Afib    Sepsis ([place] [address] ) Past Surgical History:   Procedure Laterality Date    AORTIC VALVE REPLACEMENT      CARDIAC DEFIBRILLATOR PLACEMENT  04/2014   [facility] CARDIAC SURGERY  02/2014    [facility]    COLONOSCOPY      INSERT / REPLACE / REMOVE PACEMAKER      JOINT REPLACEMENT Left     LTKR    TOTAL KNEE ARTHROPLASTY Left     VASCULAR SURGERY      WISDOM TOOTH EXTRACTION         Family History   Problem Relation Age of Onset    Arthritis Mother     Arthritis Father      I have reviewed and agree with the history as documented  Social History   Substance Use Topics    Smoking status: Never Smoker    Smokeless tobacco: Never Used    Alcohol use No        Review of Systems   Constitutional: Negative for activity change, appetite change and fever  HENT: Negative for congestion and facial swelling  Eyes: Negative for discharge and redness  Respiratory: Negative for cough and wheezing  Cardiovascular: Negative for chest pain and leg swelling  Gastrointestinal: Negative for abdominal distention, abdominal pain and blood in stool  Endocrine: Negative for cold intolerance and polydipsia  Genitourinary: Negative for difficulty urinating and hematuria  Musculoskeletal: Negative for arthralgias, back pain and gait problem  Skin: Negative for color change and rash  Allergic/Immunologic: Negative for food allergies and immunocompromised state  Neurological: Negative for dizziness, seizures and headaches  Hematological: Negative for adenopathy  Does not bruise/bleed easily  Psychiatric/Behavioral: Negative for agitation, confusion and decreased concentration  All other systems reviewed and are negative  Physical Exam  Physical Exam   Constitutional: He is oriented to person, place, and time  He appears well-developed and well-nourished  Non-toxic appearance  HENT:   Head: Normocephalic and atraumatic     Right Ear: Tympanic membrane normal    Left Ear: Tympanic membrane normal    Nose: Nose normal  Mouth/Throat: Oropharynx is clear and moist    Eyes: Conjunctivae, EOM and lids are normal  Pupils are equal, round, and reactive to light  Neck: Trachea normal and normal range of motion  Neck supple  No JVD present  Carotid bruit is not present  Cardiovascular: Normal rate, regular rhythm, normal heart sounds and intact distal pulses  No extrasystoles are present  Pulmonary/Chest: Effort normal  He has no decreased breath sounds  He has no wheezes  He has no rhonchi  He has no rales  He exhibits no tenderness and no deformity  Abdominal: Soft  Bowel sounds are normal  There is no tenderness  There is no rebound, no guarding and no CVA tenderness  Musculoskeletal:        Right shoulder: He exhibits normal range of motion, no tenderness, no swelling and no deformity  Cervical back: Normal  He exhibits normal range of motion, no tenderness, no bony tenderness and no deformity  Lymphadenopathy:     He has no cervical adenopathy  He has no axillary adenopathy  Neurological: He is alert and oriented to person, place, and time  He has normal strength and normal reflexes  No cranial nerve deficit or sensory deficit  He displays a negative Romberg sign  Skin: Skin is warm and dry  Psychiatric: He has a normal mood and affect  His speech is normal and behavior is normal  Judgment and thought content normal  Cognition and memory are normal    Nursing note and vitals reviewed        Vital Signs  ED Triage Vitals [08/11/18 0944]   Temperature Pulse Respirations Blood Pressure SpO2   98 7 °F (37 1 °C) 72 18 (!) 177/78 97 %      Temp Source Heart Rate Source Patient Position - Orthostatic VS BP Location FiO2 (%)   Oral Monitor Lying Right arm --      Pain Score       2           Vitals:    08/11/18 0944 08/11/18 1145   BP: (!) 177/78 129/64   Pulse: 72 60   Patient Position - Orthostatic VS: Lying Lying       Visual Acuity      ED Medications  Medications - No data to display    Diagnostic Studies  Results Reviewed     Procedure Component Value Units Date/Time    Protime-INR [[id number]]  (Abnormal) Collected:  08/11/18 1020    Lab Status:  Final result Specimen:  Blood from Arm, Right Updated:  08/11/18 1047     Protime 30 4 (H) seconds      INR 3 02 (H)    APTT [[id number]]  (Abnormal) Collected:  08/11/18 1020    Lab Status:  Final result Specimen:  Blood from Arm, Right Updated:  08/11/18 1047     PTT 49 (H) seconds     Basic metabolic panel [[id number]]  (Abnormal) Collected:  08/11/18 1020    Lab Status:  Final result Specimen:  Blood from Arm, Right Updated:  08/11/18 1037     Sodium 142 mmol/L      Potassium 4 0 mmol/L      Chloride 106 mmol/L      CO2 27 mmol/L      Anion Gap 9 mmol/L      BUN 34 (H) mg/dL      Creatinine 1 56 (H) mg/dL      Glucose 163 (H) mg/dL      Calcium 8 7 mg/dL      eGFR 41 ml/min/1 73sq m     Narrative:         National Kidney Disease Education Program recommendations are as follows:  GFR calculation is accurate only with a steady state creatinine  Chronic Kidney disease less than 60 ml/min/1 73 sq  meters  Kidney failure less than 15 ml/min/1 73 sq  meters      CBC and differential [[id number]]  (Abnormal) Collected:  08/11/18 1020    Lab Status:  Final result Specimen:  Blood from Arm, Right Updated:  08/11/18 1028     WBC 4 51 Thousand/uL      RBC 3 74 (L) Million/uL      Hemoglobin 11 1 (L) g/dL      Hematocrit 34 8 (L) %      MCV 93 fL      MCH 29 7 pg      MCHC 31 9 g/dL      RDW 13 9 %      MPV 9 8 fL      Platelets 911 Thousands/uL      nRBC 0 /100 WBCs      Neutrophils Relative 62 %      Immat GRANS % 0 %      Lymphocytes Relative 23 %      Monocytes Relative 11 %      Eosinophils Relative 4 %      Basophils Relative 0 %      Neutrophils Absolute 2 80 Thousands/µL      Immature Grans Absolute 0 01 Thousand/uL      Lymphocytes Absolute 1 02 Thousands/µL      Monocytes Absolute 0 49 Thousand/µL      Eosinophils Absolute 0 17 Thousand/µL      Basophils Absolute 0 02 Thousands/µL                  VAS lower limb venous duplex study, unilateral/limited    (Results Pending)              Procedures  Procedures       Phone Contacts  ED Phone Contact    ED Course                               MDM  Number of Diagnoses or Management Options  Leg pain: established and worsening     Amount and/or Complexity of Data Reviewed  Clinical lab tests: ordered and reviewed  Tests in the radiology section of CPT®: ordered  Tests in the medicine section of CPT®: ordered and reviewed  Independent visualization of images, tracings, or specimens: yes    Patient Progress  Patient progress: stable    CritCare Time    Disposition  Final diagnoses:   Leg pain     Time reflects when diagnosis was documented in both MDM as applicable and the Disposition within this note     Time User Action Codes Description Comment    8/11/2018 12:23 PM Catina Rowell [[id number]] Leg pain       ED Disposition     ED Disposition Condition Comment    Discharge  Perico Barley discharge to home/self care  Condition at discharge: Good        Follow-up Information     Follow up With Specialties Details Why Steve Julian MD Internal Medicine Schedule an appointment as soon as possible for a visit As needed [address] [place] [address] [facility] [address] [facility]  [phone]            Patient's Medications   Discharge Prescriptions    No medications on file     No discharge procedures on file      ED Provider  Electronically Signed by           Parth Olsen DO  08/11/18 8027

## 2018-08-17 NOTE — ANESTHESIA PREPROCEDURE EVALUATION
Review of Systems/Medical History  Patient summary reviewed    No history of anesthetic complications     Cardiovascular  EKG reviewed, Exercise tolerance (METS): <4,  Pacemaker/AICD, Hyperlipidemia, Hypertension controlled, Valve replacement aortic valve  replacement, Dysrhythmias , atrial fibrillation,   Comment: 05/18/2017  SUMMARY     PROCEDURE INFORMATION:  This was a technically difficult study      LEFT VENTRICLE:  Systolic function was normal  Ejection fraction was estimated to be 55 %  This study was inadequate for the evaluation of regional wall motion  Wall thickness was moderately increased      VENTRICULAR SEPTUM:  There was mild paradoxical motion      RIGHT VENTRICLE:  The ventricle was mildly dilated  Systolic function was mildly reduced      LEFT ATRIUM:  The atrium was mildly dilated      RIGHT ATRIUM:  The atrium was mildly dilated      MITRAL VALVE:  There was mild regurgitation      AORTIC VALVE:  A bioprosthesis was present  It exhibited normal function      TRICUSPID VALVE:  There was mild to moderate regurgitation      PULMONIC VALVE:  There was mild regurgitation      AORTA:  The root exhibited mild dilatation  3 68 cm,  Pulmonary  Negative pulmonary ROS Not a smoker ,        GI/Hepatic    GERD well controlled,        Negative  ROS        Endo/Other  Diabetes well controlled type 2 Oral agent,   Obesity  super morbid obesity   GYN       Hematology  Anemia ,     Musculoskeletal    Arthritis     Neurology  Negative neurology ROS      Psychology   Negative psychology ROS              Physical Exam    Airway    Mallampati score: III  TM Distance: >3 FB  Neck ROM: full     Dental   Comment: Upper partial denture ,     Cardiovascular  Rhythm: regular, Rate: normal,     Pulmonary  Breath sounds clear to auscultation,     Other Findings        Anesthesia Plan  ASA Score- 3     Anesthesia Type- general with ASA Monitors  Additional Monitors:   Airway Plan: LMA  Plan Factors-  Patient did not smoke on day of surgery  Induction- intravenous  Postoperative Plan-   Planned trial extubation    Informed Consent- Anesthetic plan and risks discussed with patient  I personally reviewed this patient with the CRNA  Discussed and agreed on the Anesthesia Plan with the CRNA  Edwin Davis

## 2018-08-17 NOTE — OP NOTE
OPERATIVE REPORT  PATIENT NAME: Daniela Olvera    :  1935  MRN: [id number]  Pt Location: AN  OR ROOM 05    SURGERY DATE: 2018    Surgeon(s) and Role:     Jak Correia MD - Primary    Preop Diagnosis:  Venous ulcer of ankle, right ([facility] ) [I83 013, P67 [id number]]    Post-Op Diagnosis Codes: * Venous ulcer of ankle, right (HCC) [I83 013, Z87 319]    Procedure(s) (LRB):  LEG PERFORATED INJECTION SCLEROTHERAPY (Right)    Specimen(s):  * No specimens in log *    Estimated Blood Loss:   Minimal    Drains:   none    Anesthesia Type:   Choice    Operative Indications:  Venous ulcer of ankle, right ([facility] ) [I83 013, I85 319]      Operative Findings:  Large incompetent perforators arising from posterior tibial vein in the lower medial leg in the vicinity of the ulcerations  Both successfully closed with injection with Asclera 1/2 % Foam   Remnant GSV in the distal calf also obliterated with Asclera Foam  There is patent posterior tibial vein and artery at completion duplex    Complications:   None    Procedure and Technique:  Patient was brought to OR and LMA was used to induce anesthesia  Ancef was given for iv antibiotics  Duplex was performed to evaluate the veins  The incompetent perforators were identified as directly contributing to the ulcerated area of skin  The GSV in mid to distal calf had recanalized / recurred after prior stripping  The GSV in most of the thigh was absent, however the proximal thigh / inguinal region did have a recurrence    Entire leg was prepped and draped with betadine  Foam was created using Tessari method with 1:4 Asclera: Air  We cannulated each of the perforators using micropuncture needle under realtime ultrasound guidance  Entry was confirmed with aspiration of venous blood  Then Foam was injected slowly into the   We then performed flexion and extension of the foot for several times  Duplex was done and showed patent posterior tibial vein and cessation of flow in the        We then injected Asclera foam after cannulating the remnant GSV in the distal calf and obliterated it    Total of 4cc of Asclera was used in procedure    Adaptic and multilayered compression dressings were applied using webroll and ACE wrap    Patient tolerated procedure well     I was present for the entire procedure and A qualified resident physician was not available    Patient Disposition:  PACU     SIGNATURE: Derrick Roth MD  DATE: August 17, 2018  TIME: 12:09 PM

## 2018-08-17 NOTE — INTERVAL H&P NOTE
H&P reviewed  After examining the patient I find no changes in the patients condition since the H&P had been written  Plan for right GSV ligation and stripping and  sclerotherapy

## 2018-08-21 ENCOUNTER — OFFICE VISIT (OUTPATIENT)
Dept: VASCULAR SURGERY | Facility: CLINIC | Age: 83
End: 2018-08-21

## 2018-08-21 VITALS
BODY MASS INDEX: 41.75 KG/M2 | SYSTOLIC BLOOD PRESSURE: 146 MMHG | TEMPERATURE: 97.6 F | HEIGHT: 73 IN | WEIGHT: 315 LBS | DIASTOLIC BLOOD PRESSURE: 82 MMHG

## 2018-08-21 DIAGNOSIS — I83.013 VENOUS ULCER OF ANKLE, RIGHT (HCC): Primary | ICD-10-CM

## 2018-08-21 DIAGNOSIS — I87.2 CHRONIC VENOUS INSUFFICIENCY: ICD-10-CM

## 2018-08-21 DIAGNOSIS — L97.319 VENOUS ULCER OF ANKLE, RIGHT (HCC): Primary | ICD-10-CM

## 2018-08-21 PROCEDURE — 99024 POSTOP FOLLOW-UP VISIT: CPT | Performed by: NURSE PRACTITIONER

## 2018-08-21 NOTE — PROGRESS NOTES
Assessment/Plan:   81year-oldmale with HTN, DM, paroxysmal AFib, chronic venous insufficiency, history of right lower extremity DVT/PE and Josue filter at age 28 on chronic anticoagulation, history of right GSV stripping with recurrent varicosities and right medial ankle venous ulcerations now status post foam injection of perforated vein and remnant saphenous vein of the calf 8/17 by Dr Diane Zheng  He presents to the office for postop follow-up   -Continue local wound care at [address] for venous ulcerations  -He worn compression for years and requires a new script for right lower extremity as it has been swelling the past several weeks and current pair of compression does not fit  -Recommending pneumatic compression pounds given his chronic venous insufficiency, skin fibrosis and diffuse ulcerations  -Will do postoperative venous scan next week  -Return to the office in 3-4 weeks with Dr Diane Zheng for recheck       Problem List Items Addressed This Visit        Cardiovascular and Mediastinum    Chronic venous insufficiency    Relevant Orders    Compression Stocking    VAS lower limb venous duplex study, unilateral/limited    Pneumatic compression pumps       Musculoskeletal and Integument    Venous ulcer of ankle, right ([address] ) - Primary    Relevant Orders    Compression Stocking    VAS lower limb venous duplex study, unilateral/limited    Pneumatic compression pumps                 Patient ID: Lux Saldana is a 81 y o  male  Chief complaint: DRESSING CHANGE R s/p SCLERO INJ 8/17 SLT EDD    HPI  81year-oldmale with HTN, DM, paroxysmal AFib, chronic venous insufficiency, history of right lower extremity DVT/PE and Gideon filter at age 28 on chronic anticoagulation, history of right GSV stripping with recurrent varicosities and right medial ankle venous ulcerations now status post foam injection of perforated vein and remnant saphenous vein of the calf 8/17 by Dr Diane Zheng    He presents to the office for postop follow-up  He has had worsening right lower extremity swelling over the past several weeks  His current compression does not fit properly due to the increased size of the right lower extremity  He has been maintained on Coumadin throughout his preoperative/postoperative  He has pain to the proximal calf ulceration  He going to wound care weekly  He was at wound Care center today  He denies any chest pain, palpitations, shortness of breath  The following portions of the patient's history were reviewed and updated as appropriate: allergies, current medications, past family history, past medical history, past social history, past surgical history and problem list     Review of Systems   Constitutional: Negative  HENT: Negative  Eyes: Negative  Respiratory: Negative  Cardiovascular: Positive for leg swelling  Gastrointestinal: Negative  Endocrine: Negative  Genitourinary: Negative  Musculoskeletal: Negative  Skin: Positive for wound  Allergic/Immunologic: Negative  Neurological: Negative  Hematological: Negative  Psychiatric/Behavioral: Negative            Objective:  Vitals:    08/21/18 1506   BP: 146/82   BP Location: Right arm   Patient Position: Sitting   Cuff Size: Adult   Temp: 97 6 °F (36 4 °C)   TempSrc: Tympanic   Weight: (!) 161 kg (355 lb)   Height: 6' 1" (1 854 m)       Patient Active Problem List   Diagnosis    Facial cellulitis    Dental abscess    Epididymitis    Cellulitis of scrotum    Morbid obesity ([address] )    Type 2 diabetes mellitus with [facility] complication ([facility]    Hydrocele    Chronic anticoagulation    Anemia    PAF (paroxysmal atrial fibrillation) ([facility])    S/P AVR    Essential hypertension    DVT (deep venous thrombosis) ([facility])    Presence of implantable cardioverter-defibrillator (ICD)    Generalized edema    Acute on chronic diastolic heart failure ([facility])    Venous ulcer of ankle, right ([facility])    Chronic venous insufficiency Past Surgical History:   Procedure Laterality Date    AORTIC VALVE REPLACEMENT      CARDIAC DEFIBRILLATOR PLACEMENT  04/2014   [facility] CARDIAC SURGERY  02/2014    AVR    COLONOSCOPY      INSERT / REPLACE / REMOVE PACEMAKER      JOINT REPLACEMENT Left     LTKR    IL LIGATE/STRIP LONG SAPH VEIN BELW SEP-FEM JUNC Right 8/17/2018    Procedure: LEG PERFORATED INJECTION SCLEROTHERAPY;  Surgeon: Derrick Roth MD;  [facility];  Service: Vascular    TOTAL KNEE ARTHROPLASTY Left     VASCULAR SURGERY      WISDOM TOOTH EXTRACTION         Family History   Problem Relation Age of Onset    Arthritis Mother     Arthritis Father        Social History     Social History    Marital status: /Civil Union     Spouse name: N/A    Number of children: N/A    Years of education: N/A     Occupational History    Not on file       Social History Main Topics    Smoking status: Never Smoker    Smokeless tobacco: Never Used    Alcohol use No    Drug use: No    Sexual activity: Not on file     Other Topics Concern    Not on file     Social History Narrative    No narrative on file       No Known Allergies      Current Outpatient Prescriptions:     acetaminophen (TYLENOL) 325 mg tablet, Take 2 tablets by mouth every 6 (six) hours as needed for mild pain or fever, Disp: 30 tablet, Rfl: 0    atorvastatin (LIPITOR) 40 mg tablet, Take 40 mg by mouth daily after dinner Atorvastatin Calcium 40 MG Oral Tablet Take 1 tablet daily  Refills: 0  Active , Disp: , Rfl:     B Complex-C (SUPER B COMPLEX PO), Take by mouth, Disp: , Rfl:     cholecalciferol (VITAMIN D3) 1,000 units tablet, Take 2,000 Units by mouth daily, Disp: , Rfl:     HYDROcodone-acetaminophen (NORCO) 5-325 mg per tablet, Take 1 tablet by mouth every 6 (six) hours as needed for pain for up to 10 days Max Daily Amount: 4 tablets, Disp: 20 tablet, Rfl: 0    Iron Combinations (NIFEREX) TABS, TK 1 T PO BID, Disp: , Rfl: 5    meloxicam (MOBIC) 15 mg tablet, Take by mouth, Disp: , Rfl:     metFORMIN (GLUCOPHAGE) 1000 MG tablet, Take 1,000 mg by mouth daily after dinner MetFORMIN HCl 1000 MG (MOD) TB24 Take one tablet daily  Refills: 0  Active , Disp: , Rfl:     Multiple Vitamin (MULTIVITAMINS PO), Take 1 tablet by mouth daily, Disp: , Rfl:     omeprazole (PriLOSEC) 20 mg delayed release capsule, Omeprazole 20 MG Oral Tablet Delayed Release Take 1 tablet daily  Refills: 0  Active, Disp: , Rfl:     potassium chloride (K-DUR,KLOR-CON) 20 mEq tablet, Take 20 mEq by mouth daily after dinner Potassium Chloride Jesusita ER 20 MEQ Oral Tablet Extended Release Take 1 tablet daily  Refills: 0  Active , Disp: , Rfl:     sotalol AF (BETAPACE AF) 80 MG, 80 mg 2 (two) times a day  Sotalol HCl - 80 MG Oral Tablet TAKE 1 TABLET EVERY 12 HOURS DAILY  Refills: 0  Active , Disp: , Rfl:     torsemide (DEMADEX) 20 mg tablet, TAKE 1 TABLET(20 MG) BY MOUTH TWICE DAILY (Patient taking differently: TAKE 1 TABLET(20 MG) BY MOUTH  once daily), Disp: 180 tablet, Rfl: 5    valsartan (DIOVAN) 160 mg tablet, Take 1 tablet (160 mg total) by mouth daily, Disp: 30 tablet, Rfl: 11    warfarin (COUMADIN) 4 mg tablet, Take 1 tablet by mouth 2 (two) times a week for 30 days Sat/Wed, Disp: 8 tablet, Rfl: 0           Physical Exam   Constitutional: He appears well-developed  obese   Eyes: EOM are normal    Cardiovascular: Normal heart sounds and intact distal pulses  Pulmonary/Chest: Effort normal and breath sounds normal    Abdominal:   Abdomen is obese   Musculoskeletal: He exhibits edema  Right LE   Skin: Skin is warm  Venous ulceration right medial leg x3  See photo  Bottle neck shape of the right lower extremity with chronic venous skin changes   Psychiatric: He has a normal mood and affect  His behavior is normal  Judgment and thought content normal    Nursing note and vitals reviewed

## 2018-08-22 ENCOUNTER — DOCUMENTATION (OUTPATIENT)
Dept: VASCULAR SURGERY | Facility: CLINIC | Age: 83
End: 2018-08-22

## 2018-08-22 NOTE — PROGRESS NOTES
The patient was seen By Doctor Noemi Martinez on 08/21/18  Doctor Noemi Martinez ordered Pneumatic compression pumps  I faxed over the demographic sheet any notes and insurance information to phone number [phone] to [phone]

## 2018-08-28 ENCOUNTER — HOSPITAL ENCOUNTER (OUTPATIENT)
Dept: NON INVASIVE DIAGNOSTICS | Facility: CLINIC | Age: 83
Discharge: HOME | End: 2018-08-28
Payer: MEDICARE

## 2018-08-28 DIAGNOSIS — L97.319 VENOUS ULCER OF ANKLE, RIGHT (HCC): ICD-10-CM

## 2018-08-28 DIAGNOSIS — I83.013 VENOUS ULCER OF ANKLE, RIGHT (HCC): ICD-10-CM

## 2018-08-28 DIAGNOSIS — I87.2 CHRONIC VENOUS INSUFFICIENCY: ICD-10-CM

## 2018-08-28 PROCEDURE — 93971 EXTREMITY STUDY: CPT

## 2018-08-30 ENCOUNTER — OFFICE VISIT (OUTPATIENT)
Dept: CARDIOLOGY CLINIC | Facility: CLINIC | Age: 83
End: 2018-08-30
Payer: MEDICARE

## 2018-08-30 VITALS
DIASTOLIC BLOOD PRESSURE: 78 MMHG | WEIGHT: 315 LBS | SYSTOLIC BLOOD PRESSURE: 142 MMHG | BODY MASS INDEX: 41.75 KG/M2 | HEIGHT: 73 IN | HEART RATE: 62 BPM

## 2018-08-30 DIAGNOSIS — Z95.2 S/P AVR: ICD-10-CM

## 2018-08-30 DIAGNOSIS — I10 ESSENTIAL HYPERTENSION: Primary | ICD-10-CM

## 2018-08-30 DIAGNOSIS — I47.2 VENTRICULAR TACHYCARDIA (HCC): ICD-10-CM

## 2018-08-30 DIAGNOSIS — I48.0 PAF (PAROXYSMAL ATRIAL FIBRILLATION) (HCC): ICD-10-CM

## 2018-08-30 DIAGNOSIS — I82.591 CHRONIC DEEP VEIN THROMBOSIS (DVT) OF OTHER VEIN OF RIGHT LOWER EXTREMITY (HCC): ICD-10-CM

## 2018-08-30 DIAGNOSIS — Z95.810 PRESENCE OF IMPLANTABLE CARDIOVERTER-DEFIBRILLATOR (ICD): ICD-10-CM

## 2018-08-30 PROBLEM — I47.20 VENTRICULAR TACHYCARDIA: Status: ACTIVE | Noted: 2018-08-30

## 2018-08-30 PROCEDURE — 93971 EXTREMITY STUDY: CPT | Performed by: SURGERY

## 2018-08-30 PROCEDURE — 99214 OFFICE O/P EST MOD 30 MIN: CPT | Performed by: INTERNAL MEDICINE

## 2018-08-30 RX ORDER — CANDESARTAN 16 MG/1
16 TABLET ORAL
Refills: 3 | COMMUNITY
Start: 2018-08-01 | End: 2019-07-22 | Stop reason: SDUPTHER

## 2018-08-30 RX ORDER — SOTALOL HYDROCHLORIDE 80 MG/1
80 TABLET ORAL EVERY 12 HOURS
Refills: 2 | COMMUNITY
Start: 2018-07-28

## 2018-08-30 NOTE — PROGRESS NOTES
Cardiology   Thao James 81 y o  male MRN: [id number]        Impression:  1  s/p aortic valve replacement for endocarditis - doing well  2  Hypertension - borderline  3  s/p ICD for ventricular tachycardia  4  DVT - on warfarin   5  Paroxysmal atrial fibrillation - on Sotalol  In normal sinus rhythm  6  Chronic diastolic heart failure - compensated      Recommendations:  1  Continue current medications  2  Follow up in 6 months  HPI: Thao James is a 81y o  year old male with morbid obesity, hypertension, recent aortic valve replacement for endocarditis who returns for follow up  Recently in hospital 2 weeks ago for sclerotherapy in right leg  Asymptomatic from a cardiac standpoint - no chest pain, shortness of breath, or palpitations  Review of Systems   Constitutional: Negative  HENT: Negative  Eyes: Negative  Respiratory: Positive for shortness of breath  Negative for chest tightness  Cardiovascular: Positive for leg swelling  Negative for chest pain and palpitations  Gastrointestinal: Negative  Endocrine: Negative  Genitourinary: Negative  Musculoskeletal: Negative  Skin: Negative  Allergic/Immunologic: Negative  Neurological: Negative  Hematological: Negative  Psychiatric/Behavioral: Negative  All other systems reviewed and are negative          Past Medical History:   Diagnosis Date    Anemia     [facility] ([facility]     CH[facility]estive [facility] failure) [facility]     Diabetes mellitus ([facility] )     Niddm    DVT (deep vein thrombosis) in pregnancy ([facility] ) 1966    not in pregnancy    DVT (deep venous thrombosis) ([place] [address] ) 1966    GERD (gastroesophageal reflux disease)     Hyperlipidemia     Hypertension     Irregular heart beat     Afib    Sepsis [facility])      Past Surgical History:   Procedure Laterality Date    AORTIC VALVE REPLACEMENT      CARDIAC DEFIBRILLATOR PLACEMENT  04/2014   [facility] CARDIAC SURGERY  02/2014    AVR    COLONOSCOPY      INSERT / Lizbeth Moots / REMOVE PACEMAKER      JOINT REPLACEMENT Left     LTKR    NE LIGATE/STRIP LONG SAPH VEIN BELW SEP-FEM JUNC Right 8/17/2018    Procedure: LEG PERFORATED INJECTION SCLEROTHERAPY;  Surgeon: Matheus Albright MD;  [facility];  Service: Vascular    TOTAL KNEE ARTHROPLASTY Left     VASCULAR SURGERY      WISDOM TOOTH EXTRACTION       History   Alcohol Use No     History   Drug Use No     History   Smoking Status    Never Smoker   Smokeless Tobacco    Never Used     Family History   Problem Relation Age of Onset    Arthritis Mother     Arthritis Father        Allergies:  No Known Allergies    Medications:     Current Outpatient Prescriptions:     acetaminophen (TYLENOL) 325 mg tablet, Take 2 tablets by mouth every 6 (six) hours as needed for mild pain or fever, Disp: 30 tablet, Rfl: 0    atorvastatin (LIPITOR) 40 mg tablet, Take 40 mg by mouth daily after dinner Atorvastatin Calcium 40 MG Oral Tablet Take 1 tablet daily  Refills: 0  Active , Disp: , Rfl:     B Complex-C (SUPER B COMPLEX PO), Take by mouth, Disp: , Rfl:     candesartan (ATACAND) 16 mg tablet, 16 mg, Disp: , Rfl: 3    cholecalciferol (VITAMIN D3) 1,000 units tablet, Take 2,000 Units by mouth daily, Disp: , Rfl:     Iron Combinations (NIFEREX) TABS, TK 1 T PO BID, Disp: , Rfl: 5    meloxicam (MOBIC) 15 mg tablet, Take by mouth, Disp: , Rfl:     metFORMIN (GLUCOPHAGE) 1000 MG tablet, Take 1,000 mg by mouth daily after dinner MetFORMIN HCl 1000 MG (MOD) TB24 Take one tablet daily  Refills: 0  Active , Disp: , Rfl:     Multiple Vitamin (MULTIVITAMINS PO), Take 1 tablet by mouth daily, Disp: , Rfl:     omeprazole (PriLOSEC) 20 mg delayed release capsule, Omeprazole 20 MG Oral Tablet Delayed Release Take 1 tablet daily  Refills: 0  Active, Disp: , Rfl:     potassium chloride (K-DUR,KLOR-CON) 20 mEq tablet, Take 20 mEq by mouth daily after dinner Potassium Chloride Jesusita ER 20 MEQ Oral Tablet Extended Release Take 1 tablet daily  Refills: 0  Active , Disp: , Rfl:     sotalol (BETAPACE) 80 mg tablet, TK 1 T PO Q DAY AT 9 AM AND 9 PM, Disp: , Rfl: 2    torsemide (DEMADEX) 20 mg tablet, TAKE 1 TABLET(20 MG) BY MOUTH TWICE DAILY (Patient taking differently: TAKE 1 TABLET(20 MG) BY MOUTH  once daily), Disp: 180 tablet, Rfl: 5    warfarin (COUMADIN) 4 mg tablet, Take 1 tablet by mouth 2 (two) times a week for 30 days Sat/Wed, Disp: 8 tablet, Rfl: 0      Wt Readings from Last 3 Encounters:   08/30/18 (!) 160 kg (352 lb 12 8 oz)   08/21/18 (!) 161 kg (355 lb)   08/17/18 (!) 156 kg (345 lb)     Temp Readings from Last 3 Encounters:   08/21/18 97 6 °F (36 4 °C) (Tympanic)   08/17/18 (!) 96 9 °F (36 1 °C) (Temporal)   08/11/18 98 7 °F (37 1 °C) (Oral)     BP Readings from Last 3 Encounters:   08/30/18 142/78   08/21/18 146/82   08/17/18 (!) 181/77     Pulse Readings from Last 3 Encounters:   08/30/18 62   08/17/18 63   08/11/18 60         Physical Exam   Constitutional: He is oriented to person, place, and time  He appears well-developed  HENT:   Head: Atraumatic  Eyes: EOM are normal    Neck: Normal range of motion  Cardiovascular: Normal rate, regular rhythm and normal heart sounds  Exam reveals no gallop and no friction rub  No murmur heard  R LE in wrapping from surgery  Pulmonary/Chest: Effort normal and breath sounds normal  No respiratory distress  He has no wheezes  He has no rales  Abdominal: Soft  Musculoskeletal: Normal range of motion  Neurological: He is alert and oriented to person, place, and time  Skin: Skin is warm and dry  Psychiatric: He has a normal mood and affect           Laboratory Studies:  CMP:  Lab Results   Component Value Date     08/11/2018    K 4 0 08/11/2018     08/11/2018    CO2 27 08/11/2018    ANIONGAP 8 10/10/2015    BUN 34 (H) 08/11/2018    CREATININE 1 56 (H) 08/11/2018    GLUCOSE 121 10/10/2015    AST 19 04/03/2017    ALT 20 04/03/2017    BILITOT 2 23 (H) 10/10/2015 EGFR 41 2018       Cardiac testing:     Results for orders placed during the hospital encounter of 17   Echo complete with contrast if indicated    [address]  [address] [facility], [facility]  [phone]    Transthoracic Echocardiogram  2D, M-mode, Doppler, and Color Doppler    Study date:  18-May-2017    Patient: Horacio Ureña  MR number: [id number]  Account number: [de-identified]  : 1935  Age: [de-identified] years  Gender: Male  Status: Outpatient  Location: [address] [place] [address] and [facility] [place]  Height: 73 in  Weight: 339 lb  BP: 152/ 80 mmHg    Indications: Atrial fibrillation  Diagnoses: I48 0 - Atrial fibrillation    Sonographer:  ABBY Salazar  Primary Physician:  Priscilla Khanna MD  Referring Physician:  Francy Ramachandran MD  Group:  [facility]  Interpreting Physician:  Karoline Combs MD    SUMMARY    PROCEDURE INFORMATION:  This was a technically difficult study  LEFT VENTRICLE:  Systolic function was normal  Ejection fraction was estimated to be 55 %  This study was inadequate for the evaluation of regional wall motion  Wall thickness was moderately increased  VENTRICULAR SEPTUM:  There was mild paradoxical motion  RIGHT VENTRICLE:  The ventricle was mildly dilated  Systolic function was mildly reduced  LEFT ATRIUM:  The atrium was mildly dilated  RIGHT ATRIUM:  The atrium was mildly dilated  MITRAL VALVE:  There was mild regurgitation  AORTIC VALVE:  A bioprosthesis was present  It exhibited normal function  TRICUSPID VALVE:  There was mild to moderate regurgitation  PULMONIC VALVE:  There was mild regurgitation  AORTA:  The root exhibited mild dilatation  3 68 cm    HISTORY: PRIOR HISTORY: Endocarditis; Aortic stenosis; Aortic valve replacement; Hypertension; Diabetes; ICD; Cardiomyopathy; DVT; IVC Filter    PROCEDURE: The study was performed in the [place] [address] and [facility] [place]   This was a routine study  The transthoracic approach was used  The study included complete 2D imaging, M-mode, complete spectral Doppler, and color Doppler  The  heart rate was 66 bpm, at the start of the study  Images were obtained from the parasternal, apical, subcostal, and suprasternal notch acoustic windows  This was a technically difficult study  LEFT VENTRICLE: Size was normal  Systolic function was normal  Ejection fraction was estimated to be 55 %  This study was inadequate for the evaluation of regional wall motion  Wall thickness was moderately increased  No evidence of apical  thrombus  DOPPLER: Left ventricular diastolic function parameters were normal     VENTRICULAR SEPTUM: There was mild paradoxical motion  RIGHT VENTRICLE: The ventricle was mildly dilated  Systolic function was mildly reduced  Wall thickness was normal  A pacing wire was present  LEFT ATRIUM: The atrium was mildly dilated  RIGHT ATRIUM: The atrium was mildly dilated  MITRAL VALVE: Valve structure was normal  There was mild thickening  There was normal leaflet separation  DOPPLER: The transmitral velocity was within the normal range  There was no evidence for stenosis  There was mild regurgitation  AORTIC VALVE: The valve was trileaflet  Leaflets exhibited normal thickness and normal cuspal separation  A bioprosthesis was present  It exhibited normal function  DOPPLER: Transaortic velocity was within the normal range  There was no  evidence for stenosis  There was no significant regurgitation  TRICUSPID VALVE: The valve structure was normal  There was normal leaflet separation  DOPPLER: The transtricuspid velocity was within the normal range  There was no evidence for stenosis  There was mild to moderate regurgitation  PULMONIC VALVE: Leaflets exhibited normal thickness, no calcification, and normal cuspal separation  DOPPLER: The transpulmonic velocity was within the normal range   There was mild regurgitation  PERICARDIUM: There was no pericardial effusion  The pericardium was normal in appearance  AORTA: The root exhibited mild dilatation  3 68 cm    SYSTEMIC VEINS: IVC: The inferior vena cava was normal in size  SYSTEM MEASUREMENT TABLES    2D  %FS: 38 82 %  Ao Diam: 3 68 cm  EDV(Teich): 116 15 ml  EF Biplane: 52 92 %  EF(Cube): 77 1 %  EF(Teich): 68 98 %  ESV(Cube): 27 98 ml  ESV(Teich): 36 03 ml  IVSd: 1 52 cm  LA Area: 24 91 cm2  LA Diam: 3 51 cm  LVEDV MOD A2C: 101 48 ml  LVEDV MOD A4C: 143 69 ml  LVEDV MOD BP: 131 ml  LVEF MOD A2C: 53 51 %  LVEF MOD A4C: 50 94 %  LVESV MOD A2C: 47 18 ml  LVESV MOD A4C: 70 49 ml  LVESV MOD BP: 61 68 ml  LVIDd: 4 96 cm  LVIDs: 3 04 cm  LVLd A2C: 7 05 cm  LVLd A4C: 8 38 cm  LVLs A2C: 5 99 cm  LVLs A4C: 6 87 cm  LVOT Diam: 1 69 cm  LVPWd: 1 51 cm  RA Area: 26 72 cm2  RV Diam : 5 41 cm  SV MOD A2C: 54 31 ml  SV MOD A4C: 73 2 ml  SV(Cube): 94 18 ml  SV(Teich): 80 12 ml    CW  AV Env  Ti: 317 93 ms  AV VTI: 41 26 cm  AV Vmax: 1 79 m/s  AV Vmean: 1 3 m/s  AV maxP 96 mmHg  AV meanP 52 mmHg  HR: 60 11 BPM  MV VTI: 58 36 cm  MV Vmax: 1 76 m/s  MV Vmean: 1 08 m/s  MV maxP 37 mmHg  MV meanP 34 mmHg  TR Vmax: 2 92 m/s  TR maxP 14 mmHg    MM  TAPSE: 1 33 cm    PW  KISHA (VTI): 1 28 cm2  KISHA Vmax: 1 24 cm2  E': 0 06 m/s  E/E': 25 32  HR: 62 79 BPM  LVCO Dopp: 3 31 L/min  LVOT Env  Ti: 313 ms  LVOT VTI: 23 38 cm  LVOT Vmax: 0 99 m/s  LVOT Vmean: 0 75 m/s  LVOT maxPG: 3 91 mmHg  LVOT meanP 42 mmHg  LVSV Dopp: 52 64 ml  MV A Juan: 1 3 m/s  MV Dec Gonzales: 4 95 m/s2  MV DecT: 295 35 ms  MV E Juan: 1 46 m/s  MV E/A Ratio: 1 13  MVA (VTI): 0 9 cm2    [facility]    Prepared and electronically signed by    Román Hughes MD  Signed [id number] 05:13:10

## 2018-09-20 ENCOUNTER — OFFICE VISIT (OUTPATIENT)
Dept: VASCULAR SURGERY | Facility: CLINIC | Age: 83
End: 2018-09-20
Payer: MEDICARE

## 2018-09-20 VITALS
HEIGHT: 73 IN | SYSTOLIC BLOOD PRESSURE: 146 MMHG | DIASTOLIC BLOOD PRESSURE: 80 MMHG | TEMPERATURE: 98.3 F | BODY MASS INDEX: 41.75 KG/M2 | WEIGHT: 315 LBS

## 2018-09-20 DIAGNOSIS — I83.013 VENOUS ULCER OF ANKLE, RIGHT (HCC): Primary | ICD-10-CM

## 2018-09-20 DIAGNOSIS — E66.01 MORBID OBESITY (HCC): ICD-10-CM

## 2018-09-20 DIAGNOSIS — I87.2 CHRONIC VENOUS INSUFFICIENCY: ICD-10-CM

## 2018-09-20 DIAGNOSIS — L97.319 VENOUS ULCER OF ANKLE, RIGHT (HCC): Primary | ICD-10-CM

## 2018-09-20 PROCEDURE — 99214 OFFICE O/P EST MOD 30 MIN: CPT | Performed by: SURGERY

## 2018-09-20 NOTE — PROGRESS NOTES
Assessment/Plan: Morbid obesity ([facility] )    Weight loss is important, daily exercise  Venous ulcer of ankle, right (HCC)    Underwent perforators sclero injection and injection of GSV remnant in the right lower leg  He will benefit from use of lymphedema compression pump Rx for venous ulcer healing  He would benefit from skin grafting to accelerate the wound healing as well      patient has been educated on importance of exercise and elevation  He elevates the leg  Daily basis  He will start exercising using his treadmill   As he has obtained an okay from his cardiologist recently  Diagnoses and all orders for this visit:    Venous ulcer of ankle, right ([facility] )    Morbid [facility] ([facility] )    Chronic venous insufficiency               Patient ID: Clair Domingo is a 81 y o  male  SCLERO INJ 8/17 SLT EDD/ Review results LEV 08/28/18  Pt is c/o pain,swelling to right leg  Pt has wound to right leg  Pt is going [facility]  Pt is changing dressing every other day  Pt is wearing compression stockings daily  Pt is on coumadin  The wounds have drainage  The following portions of the patient's history were reviewed and updated as appropriate: allergies, current medications, past family history, past medical history, past social history, past surgical history and problem list     Review of Systems   Constitutional: Negative  HENT: Negative  Eyes: Negative  Respiratory: Negative  Cardiovascular: Positive for leg swelling  Gastrointestinal: Negative  Endocrine: Negative  Genitourinary: Negative  Musculoskeletal: Negative  Leg pain   Skin: Positive for wound  Allergic/Immunologic: Negative  Neurological: Negative  Hematological: Negative  Psychiatric/Behavioral: Negative            Objective:      /80 (BP Location: Right arm, Patient Position: Sitting, Cuff Size: Adult)   Temp 98 3 °F (36 8 °C) (Tympanic)   Ht 6' 1" (1 854 m)   Wt (!) 157 kg (347 lb) BMI 45 78 kg/m²          Physical Exam   Constitutional: He appears well-developed and well-nourished  Morbid obesity   Musculoskeletal: Normal range of motion  He exhibits edema  Skin: Skin is warm  Extensive changes of lipodermatosclerosis on the right leg  Milder changes on the left leg  There venous insufficiency wounds along the medial aspect of the right leg  There are 3 separate wounds that have granular base and are superficial    Nursing note and vitals reviewed

## 2018-09-20 NOTE — LETTER
September 20, 2018     Ria Anaya MD  [address] [address]    Patient: Eliel Alonzo   YOB: 1935   Date of Visit: 9/20/2018       Dear Dr Jevon Rogers:    Thank you for referring Shanika Look to me for evaluation  Below are my notes for this consultation  If you have questions, please do not hesitate to call me  I look forward to following your patient along with you  Sincerely,        Fernando Diggs MD        CC: Garnett Arrant, MD Ulysess Ormond, MD [address], MD  9/20/2018  5:19 PM  Addendum  Assessment/Plan: Morbid obesity ([address] )    Weight loss is important, daily exercise  Venous ulcer of ankle, right (HCC)    Underwent perforators sclero injection and injection of GSV remnant in the right lower leg  He will benefit from use of lymphedema compression pump Rx for venous ulcer healing  He would benefit from skin grafting to accelerate the wound healing as well      patient has been educated on importance of exercise and elevation  He elevates the leg  Daily basis  He will start exercising using his treadmill   As he has obtained an okay from his cardiologist recently  Diagnoses and all orders for this visit:    Venous ulcer of ankle, right ([address] )    Morbid obesity ([facility] [address] )    Chronic venous insufficiency               Patient ID: Eliel Alonzo is a 81 y o  male  SCLERO INJ 8/17 SLT EDD/ Review results LEV 08/28/18  Pt is c/o pain,swelling to right leg  Pt has wound to right leg  Pt is going to [facility] weekly  Pt is changing dressing every other day  Pt is wearing compression stockings daily  Pt is on coumadin  The wounds have drainage          The following portions of the patient's history were reviewed and updated as appropriate: allergies, current medications, past family history, past medical history, past social history, past surgical history and problem list     Review of Systems   Constitutional: Negative  HENT: Negative  Eyes: Negative  Respiratory: Negative  Cardiovascular: Positive for leg swelling  Gastrointestinal: Negative  Endocrine: Negative  Genitourinary: Negative  Musculoskeletal: Negative  Leg pain   Skin: Positive for wound  Allergic/Immunologic: Negative  Neurological: Negative  Hematological: Negative  Psychiatric/Behavioral: Negative  Objective:      /80 (BP Location: Right arm, Patient Position: Sitting, Cuff Size: Adult)   Temp 98 3 °F (36 8 °C) (Tympanic)   Ht 6' 1" (1 854 m)   Wt (!) 157 kg (347 lb)   BMI 45 78 kg/m²           Physical Exam   Constitutional: He appears well-developed and well-nourished  Morbid obesity   Musculoskeletal: Normal range of motion  He exhibits edema  Skin: Skin is warm  Extensive changes of lipodermatosclerosis on the right leg  Milder changes on the left leg  There venous insufficiency wounds along the medial aspect of the right leg  There are 3 separate wounds that have granular base and are superficial    Nursing note and vitals reviewed

## 2018-09-20 NOTE — ASSESSMENT & PLAN NOTE
Underwent perforators sclero injection and injection of GSV remnant in the right lower leg  He will benefit from use of lymphedema compression pump Rx for venous ulcer healing  He would benefit from skin grafting to accelerate the wound healing as well      patient has been educated on importance of exercise and elevation  He elevates the leg  Daily basis  He will start exercising using his treadmill   As he has obtained an okay from his cardiologist recently

## 2018-09-24 PROBLEM — I89.0 SECONDARY LYMPHEDEMA: Status: ACTIVE | Noted: 2018-09-24

## 2018-09-24 NOTE — ASSESSMENT & PLAN NOTE
Due to long standing venous insufficiency he has secondary lymphedema  Non healing ulceration for several months > 6 months and has undergone compression therapy with no improvement  He will benefit from use of lymphedema compression pumps

## 2018-10-15 ENCOUNTER — APPOINTMENT (OUTPATIENT)
Dept: WOUND CARE | Facility: HOSPITAL | Age: 83
End: 2018-10-15
Payer: MEDICARE

## 2018-10-15 PROCEDURE — 11042 DBRDMT SUBQ TIS 1ST 20SQCM/<: CPT

## 2018-10-15 PROCEDURE — 11045 DBRDMT SUBQ TISS EACH ADDL: CPT

## 2018-10-15 PROCEDURE — 99214 OFFICE O/P EST MOD 30 MIN: CPT

## 2018-10-29 ENCOUNTER — APPOINTMENT (OUTPATIENT)
Dept: WOUND CARE | Facility: HOSPITAL | Age: 83
End: 2018-10-29
Payer: MEDICARE

## 2018-10-29 PROCEDURE — 11042 DBRDMT SUBQ TIS 1ST 20SQCM/<: CPT

## 2018-11-05 ENCOUNTER — APPOINTMENT (OUTPATIENT)
Dept: WOUND CARE | Facility: HOSPITAL | Age: 83
End: 2018-11-05
Payer: MEDICARE

## 2018-11-05 PROCEDURE — 11042 DBRDMT SUBQ TIS 1ST 20SQCM/<: CPT

## 2018-11-09 ENCOUNTER — APPOINTMENT (OUTPATIENT)
Dept: WOUND CARE | Facility: HOSPITAL | Age: 83
End: 2018-11-09
Payer: MEDICARE

## 2018-11-09 PROCEDURE — 99211 OFF/OP EST MAY X REQ PHY/QHP: CPT

## 2018-11-12 ENCOUNTER — APPOINTMENT (OUTPATIENT)
Dept: WOUND CARE | Facility: HOSPITAL | Age: 83
End: 2018-11-12
Payer: MEDICARE

## 2018-11-12 PROCEDURE — 11045 DBRDMT SUBQ TISS EACH ADDL: CPT

## 2018-11-12 PROCEDURE — 11042 DBRDMT SUBQ TIS 1ST 20SQCM/<: CPT

## 2018-11-19 ENCOUNTER — APPOINTMENT (OUTPATIENT)
Dept: WOUND CARE | Facility: HOSPITAL | Age: 83
End: 2018-11-19
Payer: MEDICARE

## 2018-11-19 PROCEDURE — 15272 SKIN SUB GRAFT T/A/L ADD-ON: CPT

## 2018-11-19 PROCEDURE — 15271 SKIN SUB GRAFT TRNK/ARM/LEG: CPT

## 2018-11-26 ENCOUNTER — APPOINTMENT (OUTPATIENT)
Dept: WOUND CARE | Facility: HOSPITAL | Age: 83
End: 2018-11-26
Payer: MEDICARE

## 2018-11-26 ENCOUNTER — LAB REQUISITION (OUTPATIENT)
Dept: LAB | Facility: HOSPITAL | Age: 83
End: 2018-11-26
Payer: MEDICARE

## 2018-11-26 DIAGNOSIS — L03.115 CELLULITIS OF RIGHT LOWER EXTREMITY: ICD-10-CM

## 2018-11-26 PROCEDURE — 87147 CULTURE TYPE IMMUNOLOGIC: CPT | Performed by: PREVENTIVE MEDICINE

## 2018-11-26 PROCEDURE — 99214 OFFICE O/P EST MOD 30 MIN: CPT

## 2018-11-26 PROCEDURE — 87186 SC STD MICRODIL/AGAR DIL: CPT | Performed by: PREVENTIVE MEDICINE

## 2018-11-26 PROCEDURE — 87205 SMEAR GRAM STAIN: CPT | Performed by: PREVENTIVE MEDICINE

## 2018-11-26 PROCEDURE — 87070 CULTURE OTHR SPECIMN AEROBIC: CPT | Performed by: PREVENTIVE MEDICINE

## 2018-11-26 PROCEDURE — 11042 DBRDMT SUBQ TIS 1ST 20SQCM/<: CPT

## 2018-11-29 LAB
BACTERIA WND AEROBE CULT: ABNORMAL
BACTERIA WND AEROBE CULT: ABNORMAL
GRAM STN SPEC: ABNORMAL

## 2018-12-03 ENCOUNTER — APPOINTMENT (OUTPATIENT)
Dept: WOUND CARE | Facility: HOSPITAL | Age: 83
End: 2018-12-03
Payer: MEDICARE

## 2018-12-03 LAB
CREAT ?TM UR-SCNC: 56 UMOL/L
EXT MICROALBUMIN URINE RANDOM: 0.2
EXT MICROALBUMIN URINE RANDOM: 0.2
HBA1C MFR BLD HPLC: 6.4 %
MICROALBUMIN/CREAT UR: 4 MG/G{CREAT}
MICROALBUMIN/CREAT UR: 4 MG/G{CREAT}

## 2018-12-03 PROCEDURE — 15271 SKIN SUB GRAFT TRNK/ARM/LEG: CPT

## 2018-12-06 ENCOUNTER — APPOINTMENT (OUTPATIENT)
Dept: WOUND CARE | Facility: HOSPITAL | Age: 83
End: 2018-12-06
Payer: MEDICARE

## 2018-12-06 PROCEDURE — 99211 OFF/OP EST MAY X REQ PHY/QHP: CPT

## 2018-12-10 ENCOUNTER — APPOINTMENT (OUTPATIENT)
Dept: WOUND CARE | Facility: HOSPITAL | Age: 83
End: 2018-12-10
Payer: MEDICARE

## 2018-12-10 PROCEDURE — 99214 OFFICE O/P EST MOD 30 MIN: CPT

## 2018-12-17 ENCOUNTER — APPOINTMENT (OUTPATIENT)
Dept: WOUND CARE | Facility: HOSPITAL | Age: 83
End: 2018-12-17
Payer: MEDICARE

## 2018-12-17 PROCEDURE — 15271 SKIN SUB GRAFT TRNK/ARM/LEG: CPT

## 2018-12-24 ENCOUNTER — APPOINTMENT (OUTPATIENT)
Dept: WOUND CARE | Facility: HOSPITAL | Age: 83
End: 2018-12-24
Payer: MEDICARE

## 2018-12-24 PROCEDURE — 15271 SKIN SUB GRAFT TRNK/ARM/LEG: CPT

## 2018-12-26 ENCOUNTER — OFFICE VISIT (OUTPATIENT)
Dept: VASCULAR SURGERY | Facility: CLINIC | Age: 83
End: 2018-12-26
Payer: MEDICARE

## 2018-12-26 VITALS
HEIGHT: 73 IN | RESPIRATION RATE: 18 BRPM | WEIGHT: 315 LBS | SYSTOLIC BLOOD PRESSURE: 136 MMHG | BODY MASS INDEX: 41.75 KG/M2 | HEART RATE: 68 BPM | DIASTOLIC BLOOD PRESSURE: 88 MMHG

## 2018-12-26 DIAGNOSIS — I83.013 VENOUS ULCER OF ANKLE, RIGHT (HCC): Primary | ICD-10-CM

## 2018-12-26 DIAGNOSIS — L97.319 VENOUS ULCER OF ANKLE, RIGHT (HCC): Primary | ICD-10-CM

## 2018-12-26 PROCEDURE — 99213 OFFICE O/P EST LOW 20 MIN: CPT | Performed by: SURGERY

## 2018-12-26 RX ORDER — WARFARIN SODIUM 6 MG/1
6 TABLET ORAL
COMMUNITY

## 2018-12-26 RX ORDER — WARFARIN SODIUM 4 MG/1
4 TABLET ORAL 2 TIMES WEEKLY
COMMUNITY
End: 2019-08-01 | Stop reason: SDUPTHER

## 2018-12-26 RX ORDER — LIRAGLUTIDE 6 MG/ML
1.8 INJECTION SUBCUTANEOUS DAILY
Refills: 0 | COMMUNITY
Start: 2018-12-11 | End: 2019-06-04 | Stop reason: SDUPTHER

## 2018-12-26 NOTE — LETTER
December 26, 2018     Juanito Price MD  [address] [place] [id number]    Patient: Wenceslao Mcardle   YOB: 1935   Date of Visit: 12/26/2018       Dear Dr Tanja Bernal: Thank you for referring Donato Blair to me for evaluation  Below are my notes for this consultation  If you have questions, please do not hesitate to call me  I look forward to following your patient along with you  Sincerely,        Juan Cleveland MD        CC: [address] Jaime MD  12/26/2018  5:19 PM  Sign at close encounter  Assessment/Plan:    Venous ulcer of ankle, right [facility])    Patient underwent greater saphenous vein remnant sclero injection and also perforated sclerotherapy in August 2018 for nonhealing ulcerations that were venous in etiology  Patient also has significant lymphedema is using compression therapy for ulceration healing  He is here for follow-up  ulceration is improving with regular use of compression, lymphedema pumps and recently wound care center has been placing placental graft over it  Follow up as needed  Diagnoses and all orders for this visit:    Venous ulcer of ankle, right ([address] )    Other orders  -     NOVOFINE 32G X 6 MM MISC; USE 1 NEEDLE QD FOR VICTOZA INJECTION  -     VICTOZA injection; 1 8 mg daily For diabetes  -     warfarin (COUMADIN) 4 mg tablet; Take 6 mg by mouth 2 (two) times a week    -     warfarin (COUMADIN) 6 mg tablet; Take 6 mg by mouth 2 (two) times a week          Subjective:      Patient ID: Wenceslao Mcardle is a 81 y o  male  Pt is here for his 3 month follow up of his 3 right lower leg ulcer  Pt uses his pump 3 time a day for a half hour  Pump has helped with the swelling  Pt does elevate his legs as often as possible  Pt is currently taking Warfarin 4 mg and Lipitor 40 mg  Patient is here for follow-up is right lower extremity wounds and swelling    He has undergone sclero injection in its August for greater saphenous vein and  reflux  He  Is going to wound [facility] once a week where there using special placental membrane dressings with significant improvement in his wound  He also regularly uses the lymphedema compression therapy pumps 3 times a day and has noticed significant improvement in his leg swelling  The wound has also improved significantly  The following portions of the patient's history were reviewed and updated as appropriate: allergies, current medications, past family history, past medical history, past social history, past surgical history and problem list     Review of Systems   Constitutional: Negative  HENT: Positive for trouble swallowing  Eyes: Negative  Respiratory: Positive for shortness of breath (With activity)  Cardiovascular: Positive for leg swelling  Gastrointestinal: Negative  Endocrine: Negative  Genitourinary: Negative  Musculoskeletal: Positive for back pain  Skin: Positive for color change and wound  Allergic/Immunologic: Negative  Neurological: Negative  Hematological: Negative  Psychiatric/Behavioral: Negative  I have reviewed the review of systems as entered and made appropriate changes as necessary    Objective:      /88 (BP Location: Right arm, Patient Position: Sitting, Cuff Size: Adult)   Pulse 68   Resp 18   Ht 6' 1" (1 854 m)   Wt (!) 154 kg (340 lb)   BMI 44 86 kg/m²           Physical Exam   Constitutional: He is oriented to person, place, and time  He appears well-developed and well-nourished  Morbid obesity   HENT:   Head: Normocephalic and atraumatic  Cardiovascular: Normal rate  Pulmonary/Chest: Effort normal    Abdominal: Soft  Bowel sounds are normal    Musculoskeletal: He exhibits edema ( significantly improved compared to last office visit  The wound is in a special wrap with placental graft hence not taken down this office visit)  Neurological: He is alert and oriented to person, place, and time  Skin: Skin is warm and dry  Psychiatric: He has a normal mood and affect  His behavior is normal    Nursing note and vitals reviewed

## 2018-12-26 NOTE — PROGRESS NOTES
Assessment/Plan:    Venous ulcer of ankle, right [facility])    Patient underwent greater saphenous vein remnant sclero injection and also perforated sclerotherapy in August 2018 for nonhealing ulcerations that were venous in etiology  Patient also has significant lymphedema is using compression therapy for ulceration healing  He is here for follow-up  ulceration is improving with regular use of compression, lymphedema pumps and recently wound care center has been placing placental graft over it  Follow up as needed  Diagnoses and all orders for this visit:    Venous ulcer of ankle, right ([address] )    Other orders  -     NOVOFINE 32G X 6 MM MISC; USE 1 NEEDLE QD FOR VICTOZA INJECTION  -     VICTOZA injection; 1 8 mg daily For diabetes  -     warfarin (COUMADIN) 4 mg tablet; Take 6 mg by mouth 2 (two) times a week    -     warfarin (COUMADIN) 6 mg tablet; Take 6 mg by mouth 2 (two) times a week          Subjective:      Patient ID: Marietta Boone is a 81 y o  male  Pt is here for his 3 month follow up of his 3 right lower leg ulcer  Pt uses his pump 3 time a day for a half hour  Pump has helped with the swelling  Pt does elevate his legs as often as possible  Pt is currently taking Warfarin 4 mg and Lipitor 40 mg  Patient is here for follow-up is right lower extremity wounds and swelling  He has undergone sclero injection in its August for greater saphenous vein and  reflux  He  Is going to wound [place] once a week where there using special placental membrane dressings with significant improvement in his wound  He also regularly uses the lymphedema compression therapy pumps 3 times a day and has noticed significant improvement in his leg swelling  The wound has also improved significantly          The following portions of the patient's history were reviewed and updated as appropriate: allergies, current medications, past family history, past medical history, past social history, past surgical history and problem list     Review of Systems   Constitutional: Negative  HENT: Positive for trouble swallowing  Eyes: Negative  Respiratory: Positive for shortness of breath (With activity)  Cardiovascular: Positive for leg swelling  Gastrointestinal: Negative  Endocrine: Negative  Genitourinary: Negative  Musculoskeletal: Positive for back pain  Skin: Positive for color change and wound  Allergic/Immunologic: Negative  Neurological: Negative  Hematological: Negative  Psychiatric/Behavioral: Negative  I have reviewed the review of systems as entered and made appropriate changes as necessary    Objective:      /88 (BP Location: Right arm, Patient Position: Sitting, Cuff Size: Adult)   Pulse 68   Resp 18   Ht 6' 1" (1 854 m)   Wt (!) 154 kg (340 lb)   BMI 44 86 kg/m²          Physical Exam   Constitutional: He is oriented to person, place, and time  He appears well-developed and well-nourished  Morbid obesity   HENT:   Head: Normocephalic and atraumatic  Cardiovascular: Normal rate  Pulmonary/Chest: Effort normal    Abdominal: Soft  Bowel sounds are normal    Musculoskeletal: He exhibits edema ( significantly improved compared to last office visit  The wound is in a special wrap with placental graft hence not taken down this office visit)  Neurological: He is alert and oriented to person, place, and time  Skin: Skin is warm and dry  Psychiatric: He has a normal mood and affect  His behavior is normal    Nursing note and vitals reviewed

## 2018-12-26 NOTE — ASSESSMENT & PLAN NOTE
Patient underwent greater saphenous vein remnant sclero injection and also perforated sclerotherapy in August 2018 for nonhealing ulcerations that were venous in etiology  Patient also has significant lymphedema is using compression therapy for ulceration healing  He is here for follow-up  ulceration is improving with regular use of compression, lymphedema pumps and recently wound care center has been placing placental graft over it  Follow up as needed

## 2018-12-31 ENCOUNTER — APPOINTMENT (OUTPATIENT)
Dept: WOUND CARE | Facility: HOSPITAL | Age: 83
End: 2018-12-31
Payer: MEDICARE

## 2018-12-31 PROCEDURE — 15271 SKIN SUB GRAFT TRNK/ARM/LEG: CPT

## 2018-12-31 PROCEDURE — 15002 WOUND PREP TRK/ARM/LEG: CPT

## 2019-01-07 ENCOUNTER — APPOINTMENT (OUTPATIENT)
Dept: WOUND CARE | Facility: HOSPITAL | Age: 84
End: 2019-01-07
Payer: MEDICARE

## 2019-01-07 PROCEDURE — 11042 DBRDMT SUBQ TIS 1ST 20SQCM/<: CPT

## 2019-01-14 ENCOUNTER — APPOINTMENT (OUTPATIENT)
Dept: WOUND CARE | Facility: HOSPITAL | Age: 84
End: 2019-01-14
Payer: MEDICARE

## 2019-01-14 PROCEDURE — 11042 DBRDMT SUBQ TIS 1ST 20SQCM/<: CPT

## 2019-01-28 ENCOUNTER — APPOINTMENT (OUTPATIENT)
Dept: WOUND CARE | Facility: HOSPITAL | Age: 84
End: 2019-01-28
Payer: MEDICARE

## 2019-01-28 PROCEDURE — 11042 DBRDMT SUBQ TIS 1ST 20SQCM/<: CPT

## 2019-02-07 ENCOUNTER — EVALUATION (OUTPATIENT)
Dept: PHYSICAL THERAPY | Facility: CLINIC | Age: 84
End: 2019-02-07
Payer: MEDICARE

## 2019-02-07 DIAGNOSIS — Z74.09 DECREASED FUNCTIONAL MOBILITY AND ENDURANCE: ICD-10-CM

## 2019-02-07 DIAGNOSIS — R26.2 AMBULATORY DYSFUNCTION: Primary | ICD-10-CM

## 2019-02-07 PROCEDURE — 97162 PT EVAL MOD COMPLEX 30 MIN: CPT | Performed by: PHYSICAL THERAPIST

## 2019-02-07 NOTE — PROGRESS NOTES
PT Evaluation     Today's date: 2019  Patient name: Christal Cosby  : 1935  MRN: [id number]  Referring provider: Karoline Joy MD  Dx: No diagnosis found  Assessment  Assessment details: Pt is a 81year old male referred to OPPT with diagnosis of ambulatory dysfunction and currently have a vascular wound at R ankle  Presents with bilateral strength WFL per MMT however has functional strength deficits per 5 x STS  Also considered a fall risk per multiple outcome measures including FGA, TUG, and slow gait speed  He also demo very poor endurance and activity tolerance since he was easily out of breath with just ambulation in hallway, could not even complete 2 MWT  Pt would benefit from skilled PT to address above impairments in order to improve QOL, decrease fall risk, and maximize functional mobility  Goals  STG - 4 weeks  1  Complete 2 MWT  2  5 x STS < 13 sec  3  SSV = 0 8 m/s    LTG - 6-8 weeks  1  FGA > 22/30  2  Independent with HEP    Plan  Frequency: 2x week  Duration in weeks: 8  Treatment plan discussed with: patient        Subjective Evaluation    History of Present Illness  Mechanism of injury: Pt is a 81year old male referred to OPPT with diagnosis of ambulatory dysfunction  PmHx is significant for Afib, CHF, DM, hx of DVT, GERD, HLD, HTN, aortic valve replacement, cardiac defibrillator, pacemaker, L THR, and other vascular surgeries  Currently ambulating with [facility] which he started to use in Oct/2018 for balance but only uses it in the community - no AD in house or office  Still working as a chaplen and director of educator for "[employer]  Cannot walk very fall and gets OB easily  Used to exercise on TM and use recumbent bike about 2 years ago until he was recommended to stop due to vascular issues  States that he currently has a wound at R medial malleolus that is healing (bandaged now) but previously had 3 total which started in 2017   Felt like since his wounds, he has been losing a lot of "muscle tone " Pt's goals are to improve strength and lose weight  Wears compression stockings since 1966  Home set up: no steps in house, bed and bathroom on 1st floor  Lives with wife and daughter  Pain  No pain reported          Objective     Neurological Testing     Additional Neurological Details  - R LE bandaged, R LE edema noted    Strength/Myotome Testing     Left Hip   Planes of Motion   Flexion: WFL  Extension: 4  Abduction: WFL    Right Hip   Planes of Motion   Flexion: 4+  Extension: 4  Abduction: WFL    Left Knee   Flexion: WFL  Extension: WFL    Right Knee   Flexion: WFL  Extension: WFL    Left Ankle/Foot   Dorsiflexion: WFL  Plantar flexion: WFL  Right Ankle/Foot   Dorsiflexion: WFL  Plantar flexion: WFL  Neuro Exam:     Sensation   Light touch LE: right impaired  Light touch LE: left WNL  Proprioception LE: left WNL and right WNL    Coordination   Normal left heel to shin: unable due to limited ROM   Normal right heel to shin: unable due to limited ROM   Rapid alternating movements: LE impaired     Functional outcomes   Gait speed: 8 87 sec or 0 69m/s  5x sit to stand: 22 (wide ROWENA) (seconds)  TU 09 sec, no AD (seconds)  Functional outcome gait comment: Trendelenburg gait    FGA: 18/30  2MWT: 1 min 30 sec, 200 ft with no AD      MCTSIB Number of Seconds   Feet Together, Eyes Open 30   Feet Together, Eyes Closed 30   Feet Together, Eyes Open Foam 30   Feet Together, Eyes Closed Foam 30 (increased swaying)      Transfers    Sit To Stand Independent   W/C To Bed Independent   Sit To Supine Independent   Roll Independent       BP: 155/70 (pt report norm is 135/70) HR: 65    Precautions: vascular issues, HTN, DM, CHF, pacemaker    Specialty Daily Treatment Diary     Manual                                                     Exercise Diary         Nustep        Step ups - fw/lat        Step downs        Tandem stance        Hurdles Ambulation with HT/HN        Static balance FTEC - airex                                                                                                                    Modalities

## 2019-02-11 ENCOUNTER — OFFICE VISIT (OUTPATIENT)
Dept: PHYSICAL THERAPY | Facility: CLINIC | Age: 84
End: 2019-02-11
Payer: MEDICARE

## 2019-02-11 ENCOUNTER — APPOINTMENT (OUTPATIENT)
Dept: WOUND CARE | Facility: HOSPITAL | Age: 84
End: 2019-02-11
Payer: MEDICARE

## 2019-02-11 DIAGNOSIS — Z74.09 DECREASED FUNCTIONAL MOBILITY AND ENDURANCE: ICD-10-CM

## 2019-02-11 DIAGNOSIS — R26.2 AMBULATORY DYSFUNCTION: Primary | ICD-10-CM

## 2019-02-11 PROCEDURE — 97112 NEUROMUSCULAR REEDUCATION: CPT | Performed by: PHYSICAL THERAPIST

## 2019-02-11 PROCEDURE — 99211 OFF/OP EST MAY X REQ PHY/QHP: CPT

## 2019-02-11 PROCEDURE — 97150 GROUP THERAPEUTIC PROCEDURES: CPT | Performed by: PHYSICAL THERAPIST

## 2019-02-11 NOTE — PROGRESS NOTES
Daily Note     Today's date: 2019  Patient name: Jyoti Terry  : 1935  MRN: [id number]  Referring provider: Kevin Brandt MD  Dx:   Encounter Diagnosis     ICD-10-CM    1  Ambulatory dysfunction R26 2    2  Decreased functional mobility and endurance Z74 09                   Subjective: No new complaints  Objective: See treatment diary below    Precautions: vascular issues, HTN, DM, CHF, pacemaker     Specialty Daily Treatment Diary      Manual                                                                                          Exercise Diary              Nustep  L5, 10 min           Step ups - fw/lat  6" 2 x 10           Step downs             Tandem stance  30" x 2           Hurdles  fw/lat 2 laps           Ambulation with HT/HN  2 laps           Static balance FTEC - airex 30" x 3                                                                                                                                                                                                        Modalities                                                           6:00-6:25 self directed  6:25-6:35 group   6:35-7:00 one on one    Assessment: Had difficulty performing exercises without UE support and more challenged with R LE than L due to knee OA/pain  Demo SOB with exercises and required frequent rest breaks throughout the session  Patient would benefit from continued PT      Plan: Progress treatment as tolerated

## 2019-02-14 ENCOUNTER — OFFICE VISIT (OUTPATIENT)
Dept: PHYSICAL THERAPY | Facility: CLINIC | Age: 84
End: 2019-02-14
Payer: MEDICARE

## 2019-02-14 DIAGNOSIS — R26.2 AMBULATORY DYSFUNCTION: Primary | ICD-10-CM

## 2019-02-14 DIAGNOSIS — Z74.09 DECREASED FUNCTIONAL MOBILITY AND ENDURANCE: ICD-10-CM

## 2019-02-14 PROCEDURE — 97112 NEUROMUSCULAR REEDUCATION: CPT | Performed by: PHYSICAL THERAPIST

## 2019-02-14 PROCEDURE — 97110 THERAPEUTIC EXERCISES: CPT | Performed by: PHYSICAL THERAPIST

## 2019-02-14 NOTE — PROGRESS NOTES
Daily Note     Today's date: 2019   Patient name: Ramu Jamil  : 1935  MRN: [id number]  Referring provider: Summer Renteria MD  Dx:   Encounter Diagnosis     ICD-10-CM    1  Ambulatory dysfunction R26 2    2  Decreased functional mobility and endurance Z74 09                   Subjective: States that he went to see a doctor for his knee, recommended PT 2x/week for 4 week for knee (new script)  Had cortisone shot for R knee yesterday too so feeling more sore  During eval with doctor, he accidentally "hit something" that caused him to bleed but it stopped pretty quickly  Objective: See treatment diary below    Precautions: vascular issues, HTN, DM, CHF, pacemaker     Specialty Daily Treatment Diary      Manual                                                                                          Exercise Diary            Nustep  L5, 10 min  L5, 10 min          Step ups - fw/lat  6" 2 x 10  def due to knee pain          Step downs             Tandem stance  30" x 2  30" x 2         Hurdles  fw/lat 2 laps  fw/lat 2 laps         Ambulation with HT/HN  2 laps  2 laps         Static balance FTEC - airex 30" x 3   FTEC w/HT/HN - airex 30" x 3                                                                                                                                                                                                      Modalities                                                          Assessment: Pt had increased difficulty with exercises today due to increased R knee pain so also deferred step ups  Attempted hurdles without UE support however pt required at least unilateral support  Patient would benefit from continued PT      Plan: Assess R knee NV

## 2019-02-18 ENCOUNTER — OFFICE VISIT (OUTPATIENT)
Dept: PHYSICAL THERAPY | Facility: CLINIC | Age: 84
End: 2019-02-18
Payer: MEDICARE

## 2019-02-18 ENCOUNTER — APPOINTMENT (OUTPATIENT)
Dept: WOUND CARE | Facility: HOSPITAL | Age: 84
End: 2019-02-18
Payer: MEDICARE

## 2019-02-18 DIAGNOSIS — M17.11 OSTEOARTHRITIS OF RIGHT KNEE, UNSPECIFIED OSTEOARTHRITIS TYPE: ICD-10-CM

## 2019-02-18 DIAGNOSIS — R26.2 AMBULATORY DYSFUNCTION: Primary | ICD-10-CM

## 2019-02-18 DIAGNOSIS — Z74.09 DECREASED FUNCTIONAL MOBILITY AND ENDURANCE: ICD-10-CM

## 2019-02-18 PROCEDURE — 97597 DBRDMT OPN WND 1ST 20 CM/<: CPT

## 2019-02-18 PROCEDURE — 97112 NEUROMUSCULAR REEDUCATION: CPT | Performed by: PHYSICAL THERAPIST

## 2019-02-18 PROCEDURE — 97110 THERAPEUTIC EXERCISES: CPT | Performed by: PHYSICAL THERAPIST
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
## 2019-02-21 ENCOUNTER — OFFICE VISIT (OUTPATIENT)
Dept: PHYSICAL THERAPY | Facility: CLINIC | Age: 84
End: 2019-02-21
Payer: MEDICARE

## 2019-02-21 DIAGNOSIS — R26.2 AMBULATORY DYSFUNCTION: Primary | ICD-10-CM

## 2019-02-21 DIAGNOSIS — M17.11 OSTEOARTHRITIS OF RIGHT KNEE, UNSPECIFIED OSTEOARTHRITIS TYPE: ICD-10-CM

## 2019-02-21 DIAGNOSIS — Z74.09 DECREASED FUNCTIONAL MOBILITY AND ENDURANCE: ICD-10-CM

## 2019-02-21 PROCEDURE — 97112 NEUROMUSCULAR REEDUCATION: CPT | Performed by: PHYSICAL THERAPIST

## 2019-02-21 PROCEDURE — 97110 THERAPEUTIC EXERCISES: CPT | Performed by: PHYSICAL THERAPIST

## 2019-02-21 NOTE — PROGRESS NOTES
Daily Note     Today's date: 2019  Patient name: Komal Davila  : 1935  MRN: [id number]  Referring provider: Jaye Wick MD  Dx:   Encounter Diagnosis     ICD-10-CM    1  Ambulatory dysfunction R26 2    2  Decreased functional mobility and endurance Z74 09    3  Osteoarthritis of right knee, unspecified osteoarthritis type M17 11                   Subjective: States knee pain is minimal today  Objective: See treatment diary below    Precautions: vascular issues, HTN, DM, CHF, pacemaker     Specialty Daily Treatment Diary      Manual               R knee flexion mobs, grade III 30" x 3                                                                         Exercise Diary        Nustep  L5, 10 min  L5, 10 min    Recumbentbike - rocking fw/bw 10 min     Step ups - fw/lat  6" 2 x 10  def due to knee pain          Step downs             Tandem stance  30" x 2  30" x 2    30" x 2     Hurdles  fw/lat 2 laps  fw/lat 2 laps         Ambulation with HT/HN  2 laps  2 laps    2 laps     Static balance FTEC - airex 30" x 3   FTEC w/HT/HN - airex 30" x 3     FTEC - airex 30" x 3     Gastroc S     30" x 2 ea   30" x 2     Mod noelle S     1 min ea       HS S     30" x 2  30" x 2     Bridges     10x  2 x 10      R prone k' flexion S       30" x 2      BW ambulation       2 laps      Sidestepping                                                                                                       Modalities                                                            Assessment: Tolerated treatment fair  Attempted full rotation on recumbent bike however pt reported high knee pain and had difficulty due to limited R knee ROM so performed fw/bw rocking instead  No knee pain reported with any other exercises  Overall demo fair balance but continues to demo trendelenburg gait  Patient would benefit from continued PT      Plan: Progress treatment as tolerated

## 2019-02-25 ENCOUNTER — OFFICE VISIT (OUTPATIENT)
Dept: PHYSICAL THERAPY | Facility: CLINIC | Age: 84
End: 2019-02-25
Payer: MEDICARE

## 2019-02-25 DIAGNOSIS — M17.11 OSTEOARTHRITIS OF RIGHT KNEE, UNSPECIFIED OSTEOARTHRITIS TYPE: ICD-10-CM

## 2019-02-25 DIAGNOSIS — Z74.09 DECREASED FUNCTIONAL MOBILITY AND ENDURANCE: ICD-10-CM

## 2019-02-25 DIAGNOSIS — R26.2 AMBULATORY DYSFUNCTION: Primary | ICD-10-CM

## 2019-02-25 PROCEDURE — 97110 THERAPEUTIC EXERCISES: CPT | Performed by: PHYSICAL THERAPIST

## 2019-02-25 PROCEDURE — 97112 NEUROMUSCULAR REEDUCATION: CPT | Performed by: PHYSICAL THERAPIST

## 2019-02-25 NOTE — PROGRESS NOTES
Daily Note     Today's date: 2019  Patient name: Bonny Ford  : 1935  MRN: [id number]  Referring provider: Mello Esquivel MD  Dx:   Encounter Diagnosis     ICD-10-CM    1  Ambulatory dysfunction R26 2    2  Decreased functional mobility and endurance Z74 09    3  Osteoarthritis of right knee, unspecified osteoarthritis type M17 11                   Subjective: Pt states that he think he irritated his knee on Saturday or  because he notices increased swelling  May be from wearing new shoes but switched back to his old one know and is feeling better  Objective: See treatment diary below    Negative R valgus test and varus test, only tenderness reported at medial knee where therapist was putting pressure       Precautions: vascular issues, HTN, DM, CHF, pacemaker     Specialty Daily Treatment Diary      Manual             R knee flexion mobs, grade III 30" x 3 30" x 3                                                                       Exercise Diary      Nustep  L5, 10 min  L5, 10 min    Recumbentbike - rocking fw/bw 10 min recumbent bike rocking fw/bw 5 min, Nustep L5, 5 min    Step ups - fw/lat  6" 2 x 10  def due to knee pain          Step downs             Tandem stance  30" x 2  30" x 2    30" x 2  30" x 2 EC   Hurdles  fw/lat 2 laps  fw/lat 2 laps         Ambulation with HT/HN  2 laps  2 laps    2 laps  2 laps    Static balance FTEC - airex 30" x 3   FTEC w/HT/HN - airex 30" x 3     FTEC - airex 30" x 3  FTEC - airex 30" x 3   Gastroc S     30" x 2 ea   30" x 2     Mod noelle S     1 min ea       HS S     30" x 2  30" x 2  30" x 3   Bridges     10x  2 x 10   2 x 10    R prone k' flexion S       30" x 2  30" x 2    BW ambulation       2 laps 1 lap   Sidestepping             R SLR - sidelying abduction         2 x 10                                                                               Modalities                                                            Assessment: Continues to have c/o medial R knee pain fátima when knee is in varus position  Very limited endurance noted and required multiple rest breaks throughout the session between sets and positional changes  During sidelying abduction, required min A to prevent pelvic retraction and VC to maintain quad contraction and minimize hip flexion due to weak hip abductors  Patient would benefit from continued PT      Plan: Progress treatment as tolerated

## 2019-02-28 ENCOUNTER — OFFICE VISIT (OUTPATIENT)
Dept: PHYSICAL THERAPY | Facility: CLINIC | Age: 84
End: 2019-02-28
Payer: MEDICARE

## 2019-02-28 DIAGNOSIS — M17.11 OSTEOARTHRITIS OF RIGHT KNEE, UNSPECIFIED OSTEOARTHRITIS TYPE: ICD-10-CM

## 2019-02-28 DIAGNOSIS — Z74.09 DECREASED FUNCTIONAL MOBILITY AND ENDURANCE: ICD-10-CM

## 2019-02-28 DIAGNOSIS — R26.2 AMBULATORY DYSFUNCTION: Primary | ICD-10-CM

## 2019-02-28 PROCEDURE — 97150 GROUP THERAPEUTIC PROCEDURES: CPT | Performed by: PHYSICAL THERAPIST

## 2019-02-28 PROCEDURE — 97110 THERAPEUTIC EXERCISES: CPT | Performed by: PHYSICAL THERAPIST

## 2019-02-28 PROCEDURE — 97112 NEUROMUSCULAR REEDUCATION: CPT | Performed by: PHYSICAL THERAPIST

## 2019-02-28 NOTE — PROGRESS NOTES
Daily Note     Today's date: 2019  Patient name: Sherif Pepe  : 1935  MRN: [id number]  Referring provider: Mary Marrero MD  Dx:   Encounter Diagnosis     ICD-10-CM    1  Ambulatory dysfunction R26 2    2  Decreased functional mobility and endurance Z74 09    3  Osteoarthritis of right knee, unspecified osteoarthritis type M17 11                   Subjective: Has 6/10 R R knee pain  States a wound opened back up but he put some cream on and it is healing again  Objective: See treatment diary below    Precautions: venous insufficiency - venous ulcer of R ankel, HTN, DM, CHF, pacemaker     Specialty Daily Treatment Diary      Manual           R knee flexion mobs, grade III 30" x 3 30" x 3  30" x 3                                                                     Exercise Diary     Nustep L6  10 min     recumbent bike rocking fw/bw 5 min, Nustep L5, 5 min    Step ups - fw/lat         Step downs         Tandem stance      30" x 2 EC   Hurdles 2 laps no UE fw/lat        Ambulation with HT/HN      2 laps    Static balance      FTEC - airex 30" x 3   Gastroc S 30" x 2        Mod noelle S         HS S 30" x 3     30" x 3   Bridges 3 x 10      2 x 10    R prone k' flexion S 30" x 3     30" x 2    BW ambulation     1 lap   Sidestepping BTB around ankles 3 laps         R SLR - sidelying abduction     2 x 10                                                                           Modalities                                                          5:00-5:25: one on one  5:25-5:35: group  5:35-6:00: self directed    Assessment: Tolerated treatment well  Challenged with hurdles without UE support  Session limited due to decreased endurance, required frequent seated rest breaks  Patient would benefit from continued PT      Plan: Progress treatment as tolerated

## 2019-03-04 ENCOUNTER — APPOINTMENT (OUTPATIENT)
Dept: WOUND CARE | Facility: HOSPITAL | Age: 84
End: 2019-03-04
Payer: MEDICARE

## 2019-03-04 ENCOUNTER — OFFICE VISIT (OUTPATIENT)
Dept: PHYSICAL THERAPY | Facility: CLINIC | Age: 84
End: 2019-03-04
Payer: MEDICARE

## 2019-03-04 DIAGNOSIS — M17.11 OSTEOARTHRITIS OF RIGHT KNEE, UNSPECIFIED OSTEOARTHRITIS TYPE: ICD-10-CM

## 2019-03-04 DIAGNOSIS — Z74.09 DECREASED FUNCTIONAL MOBILITY AND ENDURANCE: ICD-10-CM

## 2019-03-04 DIAGNOSIS — R26.2 AMBULATORY DYSFUNCTION: Primary | ICD-10-CM

## 2019-03-04 PROCEDURE — 97110 THERAPEUTIC EXERCISES: CPT | Performed by: PHYSICAL THERAPIST

## 2019-03-04 PROCEDURE — 97140 MANUAL THERAPY 1/> REGIONS: CPT | Performed by: PHYSICAL THERAPIST

## 2019-03-04 PROCEDURE — 99212 OFFICE O/P EST SF 10 MIN: CPT

## 2019-03-04 PROCEDURE — 97112 NEUROMUSCULAR REEDUCATION: CPT | Performed by: PHYSICAL THERAPIST

## 2019-03-04 NOTE — PROGRESS NOTES
Daily Note     Today's date: 3/4/2019  Patient name: Vu Stoner  : 1935  MRN: [id number]  Referring provider: Crescencio Coronel MD  Dx:   Encounter Diagnosis     ICD-10-CM    1  Ambulatory dysfunction R26 2    2  Decreased functional mobility and endurance Z74 09    3  Osteoarthritis of right knee, unspecified osteoarthritis type M17 11                   Subjective: Patient states he has no complaints today  Objective: See treatment diary below    Precautions: venous insufficiency - venous ulcer of R ankel, HTN, DM, CHF, pacemaker     Specialty Daily Treatment Diary      Manual           R knee flexion mobs, grade III 30" x 3 30" x 3  30" x 3                                                                     Exercise Diary     Nustep L6  10 min  L6 10 min   recumbent bike rocking fw/bw 5 min, Nustep L5, 5 min    Step ups - fw/lat         Step downs         Tandem stance      30" x 2 EC   Hurdles 2 laps no UE fw/lat 2 laps no UE fw/lat       Ambulation with HT/HN      2 laps    Static balance  FTEC - airex 30" x 3    FTEC - airex 30" x 3   Gastroc S 30" x 2        Mod noelle S         HS S 30" x 3 30" x 3 manual    30" x 3   Bridges 3 x 10      2 x 10    R prone k' flexion S 30" x 3 30" x 3 b/L    30" x 2    BW ambulation     1 lap   Sidestepping BTB around ankles 3 laps  BTB around ankles 3 laps        R SLR - sidelying abduction     2 x 10    marching  In // bars  B/L UE support  30"x3        cone taps   10x ea                                                          Modalities                                                            Assessment: Patient displayed significant muscle tightness with quadriceps stretching in prone  Patient was able to complete resisted side stepping with no loss in balance  Patient displayed difficultly with ambulation over hurdles  Patient would circumduct his foot to complete the hurdles 25% of the time   Patient performed marching and cone taps in order to aid in improving foot clearance during gait  Plan: Progress treatment as tolerated

## 2019-03-06 ENCOUNTER — OFFICE VISIT (OUTPATIENT)
Dept: CARDIOLOGY CLINIC | Facility: CLINIC | Age: 84
End: 2019-03-06
Payer: MEDICARE

## 2019-03-06 VITALS
HEIGHT: 73 IN | BODY MASS INDEX: 41.75 KG/M2 | HEART RATE: 70 BPM | DIASTOLIC BLOOD PRESSURE: 70 MMHG | WEIGHT: 315 LBS | SYSTOLIC BLOOD PRESSURE: 124 MMHG

## 2019-03-06 DIAGNOSIS — I48.91 ATRIAL FIBRILLATION, UNSPECIFIED TYPE (HCC): Primary | ICD-10-CM

## 2019-03-06 DIAGNOSIS — I10 ESSENTIAL HYPERTENSION: ICD-10-CM

## 2019-03-06 DIAGNOSIS — I47.2 VENTRICULAR TACHYCARDIA (HCC): ICD-10-CM

## 2019-03-06 DIAGNOSIS — Z95.2 S/P AVR: ICD-10-CM

## 2019-03-06 DIAGNOSIS — I48.0 PAF (PAROXYSMAL ATRIAL FIBRILLATION) (HCC): ICD-10-CM

## 2019-03-06 PROCEDURE — 93000 ELECTROCARDIOGRAM COMPLETE: CPT | Performed by: INTERNAL MEDICINE

## 2019-03-06 PROCEDURE — 99214 OFFICE O/P EST MOD 30 MIN: CPT | Performed by: INTERNAL MEDICINE

## 2019-03-06 NOTE — PROGRESS NOTES
Cardiology   Jyoti Terry 81 y o  male MRN: [id number]        Impression:  1  s/p aortic valve replacement for endocarditis - doing well  2  Hypertension - borderline  3  s/p ICD for ventricular tachycardia  4  DVT - on warfarin   5  Paroxysmal atrial fibrillation - on Sotalol  In normal sinus rhythm  6  Chronic diastolic heart failure - compensated      Recommendations:  1  Continue current medications  2  Check echocardiogram to evaluate aortic valve replacement  2  Follow up in 6 months  HPI: Jyoti Terry is a 81y o  year old male with morbid obesity, hypertension, paroxysmal atrial fibrillation, and aortic valve replacement for endocarditis who returns for follow up  Doing well from a cardiac standpoint  No chest pain, shortness of breath, or palpitations  Has slowly healing wound on right leg  Review of Systems   Constitutional: Negative  HENT: Negative  Eyes: Negative  Respiratory: Negative for chest tightness and shortness of breath  Cardiovascular: Positive for leg swelling  Negative for chest pain and palpitations  Gastrointestinal: Negative  Endocrine: Negative  Genitourinary: Negative  Musculoskeletal: Negative  Skin: Negative  Allergic/Immunologic: Negative  Neurological: Negative  Hematological: Negative  Psychiatric/Behavioral: Negative  All other systems reviewed and are negative          Past Medical History:   Diagnosis Date    Anemia     [facility]     [facility]     Diabetes mellitus ([facility] )     Niddm    DVT (deep vein thrombosis) in pregnancy [facility] 1966    not in pregnancy    DVT (deep venous thrombosis) ([facility] 1966    GERD (gastroesophageal reflux disease)     Hyperlipidemia     Hypertension     Irregular heart beat     Afib    Sepsis ([facility] )      Past Surgical History:   Procedure Laterality Date    AORTIC VALVE REPLACEMENT      CARDIAC DEFIBRILLATOR PLACEMENT  04/2014   [facility] CARDIAC SURGERY  02/2014    [facility]R    COLONOSCOPY      INSERT / REPLACE / REMOVE PACEMAKER      JOINT REPLACEMENT Left     LTKR    VT LIGATE/STRIP LONG SAPH VEIN BELW SEP-FEM JUNC Right 8/17/2018    Procedure: LEG PERFORATED INJECTION SCLEROTHERAPY;  Surgeon: Baron Elvira MD;  [facility];  Service: Vascular    TOTAL KNEE ARTHROPLASTY Left     VASCULAR SURGERY      WISDOM TOOTH EXTRACTION       Social History     Substance and Sexual Activity   Alcohol Use No     Social History     Substance and Sexual Activity   Drug Use No     Social History     Tobacco Use   Smoking Status Never Smoker   Smokeless Tobacco Never Used     Family History   Problem Relation Age of Onset    Arthritis Mother     Arthritis Father        Allergies:  No Known Allergies    Medications:     Current Outpatient Medications:     acetaminophen (TYLENOL) 325 mg tablet, Take 2 tablets by mouth every 6 (six) hours as needed for mild pain or fever, Disp: 30 tablet, Rfl: 0    atorvastatin (LIPITOR) 40 mg tablet, Take 40 mg by mouth daily after dinner Atorvastatin Calcium 40 MG Oral Tablet Take 1 tablet daily  Refills: 0  Active , Disp: , Rfl:     B Complex-C (SUPER B COMPLEX PO), Take by mouth, Disp: , Rfl:     candesartan (ATACAND) 16 mg tablet, 16 mg, Disp: , Rfl: 3    cholecalciferol (VITAMIN D3) 1,000 units tablet, Take 2,000 Units by mouth daily, Disp: , Rfl:     Iron Combinations (NIFEREX) TABS, TK 1 T PO BID, Disp: , Rfl: 5    meloxicam (MOBIC) 15 mg tablet, Take by mouth, Disp: , Rfl:     metFORMIN (GLUCOPHAGE) 1000 MG tablet, Take 1,000 mg by mouth daily after dinner MetFORMIN HCl 1000 MG (MOD) TB24 Take one tablet daily  Refills: 0  Active , Disp: , Rfl:     Multiple Vitamin (MULTIVITAMINS PO), Take 1 tablet by mouth daily, Disp: , Rfl:     NOVOFINE 32G X 6 MM MISC, USE 1 NEEDLE QD FOR VICTOZA INJECTION, Disp: , Rfl: 3    omeprazole (PriLOSEC) 20 mg delayed release capsule, Omeprazole 20 MG Oral Tablet Delayed Release Take 1 tablet daily  Refills: 0  Active, Disp: , Rfl:     potassium chloride (K-DUR,KLOR-CON) 20 mEq tablet, Take 20 mEq by mouth daily after dinner Potassium Chloride Jesusita ER 20 MEQ Oral Tablet Extended Release Take 1 tablet daily  Refills: 0  Active , Disp: , Rfl:     sotalol (BETAPACE) 80 mg tablet, TK 1 T PO Q DAY AT 9 AM AND 9 PM, Disp: , Rfl: 2    torsemide (DEMADEX) 20 mg tablet, TAKE 1 TABLET(20 MG) BY MOUTH TWICE DAILY (Patient taking differently: TAKE 1 TABLET(20 MG) BY MOUTH  once daily), Disp: 180 tablet, Rfl: 5    VICTOZA injection, 1 8 mg daily For diabetes, Disp: , Rfl: 0    warfarin (COUMADIN) 4 mg tablet, Take 4 mg by mouth 2 (two) times a week Patient take 4mg 5 times a week , Disp: , Rfl:     warfarin (COUMADIN) 6 mg tablet, Take 6 mg by mouth 2 (two) times a week, Disp: , Rfl:     warfarin (COUMADIN) 4 mg tablet, Take 1 tablet by mouth 2 (two) times a week for 30 days Sat/Wed, Disp: 8 tablet, Rfl: 0      Wt Readings from Last 3 Encounters:   03/06/19 (!) 154 kg (339 lb 14 4 oz)   12/26/18 (!) 154 kg (340 lb)   09/20/18 (!) 157 kg (347 lb)     Temp Readings from Last 3 Encounters:   09/20/18 98 3 °F (36 8 °C) (Tympanic)   08/21/18 97 6 °F (36 4 °C) (Tympanic)   08/17/18 (!) 96 9 °F (36 1 °C) (Temporal)     BP Readings from Last 3 Encounters:   03/06/19 124/70   12/26/18 136/88   09/20/18 146/80     Pulse Readings from Last 3 Encounters:   03/06/19 70   12/26/18 68   08/30/18 62         Physical Exam   Constitutional: He is oriented to person, place, and time  He appears well-developed  HENT:   Head: Atraumatic  Eyes: EOM are normal    Neck: Normal range of motion  Cardiovascular: Normal rate and regular rhythm  Murmur heard  Systolic murmur is present with a grade of 2/6  RLE edema   Pulmonary/Chest: Effort normal and breath sounds normal  No stridor  No respiratory distress  He has no wheezes  Musculoskeletal: Normal range of motion     Neurological: He is alert and oriented to person, place, and time  Skin: Skin is warm and dry  Psychiatric: He has a normal mood and affect  Laboratory Studies:  CMP:  Lab Results   Component Value Date     10/10/2015    K 4 0 2018     2018    CO2 27 2018    ANIONGAP 8 10/10/2015    BUN 34 (H) 2018    CREATININE 1 56 (H) 2018    GLUCOSE 121 10/10/2015    AST 19 2017    ALT 20 2017    BILITOT 2 23 (H) 10/10/2015    EGFR 41 2018       Cardiac testing:   EKG reviewed personally: NSR 70 1st deg AV block LBBB  Results for orders placed during the hospital encounter of 17   Echo complete with contrast if indicated    Narrative [facility]  [address]  [address] [facility]  [phone]    Transthoracic Echocardiogram  2D, M-mode, Doppler, and Color Doppler    Study date:  18-May-2017    Patient: Charmayne Harper  MR number: [id number]  Account number: [de-identified]  : 1935  Age: [de-identified] years  Gender: Male  Status: Outpatient  Location: [address] [place] [address] [facility] [place]  Height: 73 in  Weight: 339 lb  BP: 152/ 80 mmHg    Indications: Atrial fibrillation  Diagnoses: I48 0 - Atrial fibrillation    Sonographer:  ABBY Guido  Primary Physician:  Ishmael Ramirez MD  Referring Physician:  Chucky Rausch MD  Group:  [facility]  Interpreting Physician:  Keily Boss MD    SUMMARY    PROCEDURE INFORMATION:  This was a technically difficult study  LEFT VENTRICLE:  Systolic function was normal  Ejection fraction was estimated to be 55 %  This study was inadequate for the evaluation of regional wall motion  Wall thickness was moderately increased  VENTRICULAR SEPTUM:  There was mild paradoxical motion  RIGHT VENTRICLE:  The ventricle was mildly dilated  Systolic function was mildly reduced  LEFT ATRIUM:  The atrium was mildly dilated  RIGHT ATRIUM:  The atrium was mildly dilated      MITRAL VALVE:  There was mild regurgitation  AORTIC VALVE:  A bioprosthesis was present  It exhibited normal function  TRICUSPID VALVE:  There was mild to moderate regurgitation  PULMONIC VALVE:  There was mild regurgitation  AORTA:  The root exhibited mild dilatation  3 68 cm    HISTORY: PRIOR HISTORY: Endocarditis; Aortic stenosis; Aortic valve replacement; Hypertension; Diabetes; ICD; Cardiomyopathy; DVT; IVC Filter    PROCEDURE: The study was performed in the [address]  This was a routine study  The transthoracic approach was used  The study included complete 2D imaging, M-mode, complete spectral Doppler, and color Doppler  The  heart rate was 66 bpm, at the start of the study  Images were obtained from the parasternal, apical, subcostal, and suprasternal notch acoustic windows  This was a technically difficult study  LEFT VENTRICLE: Size was normal  Systolic function was normal  Ejection fraction was estimated to be 55 %  This study was inadequate for the evaluation of regional wall motion  Wall thickness was moderately increased  No evidence of apical  thrombus  DOPPLER: Left ventricular diastolic function parameters were normal     VENTRICULAR SEPTUM: There was mild paradoxical motion  RIGHT VENTRICLE: The ventricle was mildly dilated  Systolic function was mildly reduced  Wall thickness was normal  A pacing wire was present  LEFT ATRIUM: The atrium was mildly dilated  RIGHT ATRIUM: The atrium was mildly dilated  MITRAL VALVE: Valve structure was normal  There was mild thickening  There was normal leaflet separation  DOPPLER: The transmitral velocity was within the normal range  There was no evidence for stenosis  There was mild regurgitation  AORTIC VALVE: The valve was trileaflet  Leaflets exhibited normal thickness and normal cuspal separation  A bioprosthesis was present  It exhibited normal function  DOPPLER: Transaortic velocity was within the normal range  There was no  evidence for stenosis  There was no significant regurgitation  TRICUSPID VALVE: The valve structure was normal  There was normal leaflet separation  DOPPLER: The transtricuspid velocity was within the normal range  There was no evidence for stenosis  There was mild to moderate regurgitation  PULMONIC VALVE: Leaflets exhibited normal thickness, no calcification, and normal cuspal separation  DOPPLER: The transpulmonic velocity was within the normal range  There was mild regurgitation  PERICARDIUM: There was no pericardial effusion  The pericardium was normal in appearance  AORTA: The root exhibited mild dilatation  3 68 cm    SYSTEMIC VEINS: IVC: The inferior vena cava was normal in size  SYSTEM MEASUREMENT TABLES    2D  %FS: 38 82 %  Ao Diam: 3 68 cm  EDV(Teich): 116 15 ml  EF Biplane: 52 92 %  EF(Cube): 77 1 %  EF(Teich): 68 98 %  ESV(Cube): 27 98 ml  ESV(Teich): 36 03 ml  IVSd: 1 52 cm  LA Area: 24 91 cm2  LA Diam: 3 51 cm  LVEDV MOD A2C: 101 48 ml  LVEDV MOD A4C: 143 69 ml  LVEDV MOD BP: 131 ml  LVEF MOD A2C: 53 51 %  LVEF MOD A4C: 50 94 %  LVESV MOD A2C: 47 18 ml  LVESV MOD A4C: 70 49 ml  LVESV MOD BP: 61 68 ml  LVIDd: 4 96 cm  LVIDs: 3 04 cm  LVLd A2C: 7 05 cm  LVLd A4C: 8 38 cm  LVLs A2C: 5 99 cm  LVLs A4C: 6 87 cm  LVOT Diam: 1 69 cm  LVPWd: 1 51 cm  RA Area: 26 72 cm2  RV Diam : 5 41 cm  SV MOD A2C: 54 31 ml  SV MOD A4C: 73 2 ml  SV(Cube): 94 18 ml  SV(Teich): 80 12 ml    CW  AV Env  Ti: 317 93 ms  AV VTI: 41 26 cm  AV Vmax: 1 79 m/s  AV Vmean: 1 3 m/s  AV maxP 96 mmHg  AV meanP 52 mmHg  HR: 60 11 BPM  MV VTI: 58 36 cm  MV Vmax: 1 76 m/s  MV Vmean: 1 08 m/s  MV maxP 37 mmHg  MV meanP 34 mmHg  TR Vmax: 2 92 m/s  TR maxP 14 mmHg    MM  TAPSE: 1 33 cm    PW  KISHA (VTI): 1 28 cm2  KISHA Vmax: 1 24 cm2  E': 0 06 m/s  E/E': 25 32  HR: 62 79 BPM  LVCO Dopp: 3 31 L/min  LVOT Env  Ti: 313 ms  LVOT VTI: 23 38 cm  LVOT Vmax: 0 99 m/s  LVOT Vmean: 0 75 m/s  LVOT maxPG: 3 91 mmHg  LVOT meanP 42 mmHg  LVSV Dopp: 52 64 ml  MV A Juan: 1 3 m/s  MV Dec [place]: 4 95 m/s2  MV DecT: 295 35 ms  MV E Juan: 1 46 m/s  MV E/A Ratio: 1 13  MVA (VTI): 0 9 cm2    [facility] Accredited Echocardiography [facility]    Prepared and electronically signed by    Yesy Pereira MD  Signed [id number] 05:13:10

## 2019-03-06 NOTE — PATIENT INSTRUCTIONS
Recommendations:  1  Continue current medications  2  Check echocardiogram to evaluate aortic valve replacement  2  Follow up in 6 months

## 2019-03-07 ENCOUNTER — OFFICE VISIT (OUTPATIENT)
Dept: PHYSICAL THERAPY | Facility: CLINIC | Age: 84
End: 2019-03-07
Payer: MEDICARE

## 2019-03-07 DIAGNOSIS — R26.2 AMBULATORY DYSFUNCTION: Primary | ICD-10-CM

## 2019-03-07 DIAGNOSIS — Z74.09 DECREASED FUNCTIONAL MOBILITY AND ENDURANCE: ICD-10-CM

## 2019-03-07 DIAGNOSIS — M17.11 OSTEOARTHRITIS OF RIGHT KNEE, UNSPECIFIED OSTEOARTHRITIS TYPE: ICD-10-CM

## 2019-03-07 PROCEDURE — 97110 THERAPEUTIC EXERCISES: CPT

## 2019-03-07 PROCEDURE — 97112 NEUROMUSCULAR REEDUCATION: CPT

## 2019-03-07 PROCEDURE — 97140 MANUAL THERAPY 1/> REGIONS: CPT

## 2019-03-07 NOTE — PROGRESS NOTES
Daily Note     Today's date: 3/7/2019  Patient name: Yovana Fair  : 1935  MRN: [id number]  Referring provider: Andre Villarreal MD  Dx:   Encounter Diagnosis     ICD-10-CM    1  Ambulatory dysfunction R26 2    2  Decreased functional mobility and endurance Z74 09    3  Osteoarthritis of right knee, unspecified osteoarthritis type M17 11          Subjective: Patient noted no new complaints  Objective: See treatment diary below      Assessment: Tolerated treatment fair  Patient was very challenged with side stepping BTB instead changed band to GTB patient was able to complete 3 laps with GTB  Patient demonstrated fatigue post treatment and would benefit from continued PT      Plan: Continue per plan of care        Precautions: venous insufficiency - venous ulcer of R ankel, HTN, DM, CHF, pacemaker     Specialty Daily Treatment Diary      Manual           R knee flexion mobs, grade III 30" x 3 30" x 3  30" x 3                                                                     Exercise Diary  2/28 03/04  3/7    2/25   Nustep L6  10 min  L6 10 min  L6 10 min    recumbent bike rocking fw/bw 5 min, Nustep L5, 5 min    Step ups - fw/lat             Step downs             Tandem stance          30" x 2 EC   Hurdles 2 laps no UE fw/lat 2 laps no UE fw/lat  1 lap no UE fw       Ambulation with HT/HN          2 laps    Static balance   FTEC - airex 30" x 3  FTEC - airex 30" x 3    FTEC - airex 30" x 3   Gastroc S 30" x 2           Mod noelle S             HS S 30" x 3 30" x 3 manual  30" x 3 manual    30" x 3   Bridges 3 x 10         2 x 10    R prone k' flexion S 30" x 3 30" x 3 b/L  30" x 3 b/L    30" x 2    BW ambulation         1 lap   Sidestepping BTB around ankles 3 laps  BTB around ankles 3 laps   GTB around ankles 3 laps        R SLR - sidelying abduction         2 x 10    marching   In // bars  B/L UE support  30"x3  In // bars  B/L UE support  30"x3 2 laps //         cone taps   10x ea   10xea                                                       Modalities

## 2019-03-11 ENCOUNTER — OFFICE VISIT (OUTPATIENT)
Dept: PHYSICAL THERAPY | Facility: CLINIC | Age: 84
End: 2019-03-11
Payer: MEDICARE

## 2019-03-11 DIAGNOSIS — R26.2 AMBULATORY DYSFUNCTION: Primary | ICD-10-CM

## 2019-03-11 DIAGNOSIS — M17.11 OSTEOARTHRITIS OF RIGHT KNEE, UNSPECIFIED OSTEOARTHRITIS TYPE: ICD-10-CM

## 2019-03-11 DIAGNOSIS — Z74.09 DECREASED FUNCTIONAL MOBILITY AND ENDURANCE: ICD-10-CM

## 2019-03-11 PROCEDURE — 97112 NEUROMUSCULAR REEDUCATION: CPT

## 2019-03-11 PROCEDURE — 97140 MANUAL THERAPY 1/> REGIONS: CPT

## 2019-03-11 PROCEDURE — 97110 THERAPEUTIC EXERCISES: CPT

## 2019-03-11 NOTE — PROGRESS NOTES
Daily Note     Today's date: 3/11/2019  Patient name: Mili Melchor  : 1935  MRN: [id number]  Referring provider: Donovan Castillo MD  Dx:   Encounter Diagnosis     ICD-10-CM    1  Ambulatory dysfunction R26 2    2  Decreased functional mobility and endurance Z74 09    3  Osteoarthritis of right knee, unspecified osteoarthritis type M17 11          Subjective: States that he has noticed improvements in balance since starting PT  Objective: See treatment diary below      Assessment: Tried adding lateral stepping over hurdles, patient experienced increased R knee pain of -9/10  Patient demo some moderate sway with semi tandem stance on foam with EC, but improved with repetition  Plan: Continue per plan of care        Precautions: venous insufficiency - venous ulcer of R ankel, HTN, DM, CHF, pacemaker     Specialty Daily Treatment Diary      Manual           R knee flexion mobs, grade III 30" x 3 30" x 3  30" x 3                                                                     Exercise Diary  2/28 03/04  3/7  3/11    Nustep L6  10 min  L6 10 min  L6 10 min   L5, 10 min    Step ups - fw/lat            Step downs            Tandem stance            Hurdles 2 laps no UE fw/lat 2 laps no UE fw/lat  1 lap no UE fw  fwd, 3 laps  Lateral, 2 laps  R knee pain 8-9/10    Ambulation with HT/HN            Static balance   FTEC - airex 30" x 3  FTEC - airex 30" x 3  FTEC - airex 30" x 3    Gastroc S 30" x 2          Mod noelle S            HS S 30" x 3 30" x 3 manual  30" x 3 manual   30" x 3 manual    Bridges 3 x 10           R prone k' flexion S 30" x 3 30" x 3 b/L  30" x 3 b/L  30" x 3 b/L     BW ambulation            Sidestepping BTB around ankles 3 laps  BTB around ankles 3 laps   GTB around ankles 3 laps   GTB around ankles 3 laps     R SLR - sidelying abduction             marching   In // bars  B/L UE support  30"x3  In // bars  B/L UE support  30"x3 2 laps //        cone taps   10x ea  10xea       semi tandem stance       Foam, EC, 30''x2                                    Modalities

## 2019-03-14 ENCOUNTER — OFFICE VISIT (OUTPATIENT)
Dept: PHYSICAL THERAPY | Facility: CLINIC | Age: 84
End: 2019-03-14
Payer: MEDICARE

## 2019-03-14 DIAGNOSIS — M17.11 OSTEOARTHRITIS OF RIGHT KNEE, UNSPECIFIED OSTEOARTHRITIS TYPE: ICD-10-CM

## 2019-03-14 DIAGNOSIS — R26.2 AMBULATORY DYSFUNCTION: Primary | ICD-10-CM

## 2019-03-14 DIAGNOSIS — Z74.09 DECREASED FUNCTIONAL MOBILITY AND ENDURANCE: ICD-10-CM

## 2019-03-14 PROCEDURE — 97110 THERAPEUTIC EXERCISES: CPT | Performed by: PHYSICAL THERAPIST

## 2019-03-14 PROCEDURE — 97112 NEUROMUSCULAR REEDUCATION: CPT | Performed by: PHYSICAL THERAPIST

## 2019-03-14 NOTE — PROGRESS NOTES
Daily Note     Today's date: 3/14/2019  Patient name: Travis Pedro  : 1935  MRN: [id number]  Referring provider: José Miguel Ortiz MD  Dx:   Encounter Diagnosis     ICD-10-CM    1  Ambulatory dysfunction R26 2    2  Decreased functional mobility and endurance Z74 09    3  Osteoarthritis of right knee, unspecified osteoarthritis type M17 11                   Subjective: Pt states that he feels like balance has improved and his knee feels better in the morning but knee still sore by end of the day       Objective: See treatment diary below    Precautions: venous insufficiency - venous ulcer of R ankle, HTN, DM, CHF, pacemaker     Specialty Daily Treatment Diary      Manual   2/21  2/25  2/28  3/14      R knee flexion mobs, grade III 30" x 3 30" x 3  30" x 3       R patella mobs       3 min                                                      Exercise Diary  2/28 03/04  3/7  3/11  3/14   Nustep L6  10 min  L6 10 min  L6 10 min   L5, 10 min  L6, 10 min    Step ups - fw/lat             Step downs             Tandem stance             Hurdles 2 laps no UE fw/lat 2 laps no UE fw/lat  1 lap no UE fw  fwd, 3 laps  Lateral, 2 laps  R knee pain 8-9/10     Ambulation3 with HT/HN             Static balance   FTEC - airex 30" x 3  FTEC - airex 30" x 3  FTEC - airex 30" x 3  FTEC - airex w/ HT/HN 30" x 3   Gastroc S 30" x 2        30" x 2   Mod noelle S             HS S 30" x 3 30" x 3 manual  30" x 3 manual   30" x 3 manual   30" x 3 manual   Bridges 3 x 10            B/L Prone k' flexion S w/ strap 30" x 3 30" x 3 b/L  30" x 3 b/L  30" x 3 b/L  30" x 2    BW ambulation             Sidestepping BTB around ankles 3 laps  BTB around ankles 3 laps   GTB around ankles 3 laps   GTB around ankles 3 laps    GTB around ankles 3 laps    R SLR - sidelying abduction              marching   In // bars  B/L UE support  30"x3  In // bars  B/L UE support  30"x3 2 laps //         cone taps   10x ea   10xea    15x    semi tandem stance       Foam, EC, 30''x2  Foam, EC, 30''x2                                     Modalities                                                             Assessment: Crepitus noted during manual HS today in R knee and pt also demo hypomobile R patella  Limited seated rest breaks taken this session, demoing improved endurance and activity tolerance  Patient would benefit from continued PT      Plan: Progress treatment as tolerated  Re-eval NV

## 2019-03-18 ENCOUNTER — APPOINTMENT (OUTPATIENT)
Dept: WOUND CARE | Facility: HOSPITAL | Age: 84
End: 2019-03-18
Payer: MEDICARE

## 2019-03-18 PROCEDURE — 99212 OFFICE O/P EST SF 10 MIN: CPT

## 2019-03-19 ENCOUNTER — APPOINTMENT (OUTPATIENT)
Dept: PHYSICAL THERAPY | Facility: CLINIC | Age: 84
End: 2019-03-19
Payer: MEDICARE

## 2019-03-21 ENCOUNTER — EVALUATION (OUTPATIENT)
Dept: PHYSICAL THERAPY | Facility: CLINIC | Age: 84
End: 2019-03-21
Payer: MEDICARE

## 2019-03-21 PROCEDURE — 97110 THERAPEUTIC EXERCISES: CPT | Performed by: PHYSICAL THERAPIST

## 2019-03-21 PROCEDURE — 97112 NEUROMUSCULAR REEDUCATION: CPT | Performed by: PHYSICAL THERAPIST

## 2019-03-21 NOTE — PROGRESS NOTES
Progress Note     Today's date: 3/21/2019  Patient name: Mili Melchor  : 1935  MRN: [id number]  Referring provider: Donovan Castillo MD  Dx: No diagnosis found  Subjective: Pt feels like PT is helping fátima for the balance  Still having difficulty with R knee pain  States he saw doctor earlier this week and wound is fully healed  Not using SPC as much as before  Objective: See treatment diary below    Goals  STG - 4 weeks  1  Complete 2 MWT - NOT MET  2  5 x STS < 13 sec - NOT MET  3  SSV = 0 8 m/s - MET  4  PSFS (avg) = at least 4/10 - MET  5  Improve R Knee flexion AROM to > 105 deg - NOT MET    LTG - 6-8 weeks  1  FGA >  - progressing   2  Independent with HEP - MET  3   PSFS (avg) = at least 6/10 - progressing    PSFS  Walking long distances 5/10, prev = 4/10  Going down steps 2/10, prev = 1/10  Standing 6/10, prev = 2/10  Av/10, prev = 2/10    Avg knee pain rated as 7/10      Pain  Current pain ratin (R knee), prev = 7  At best pain ratin, prev = 2  At worst pain ratin, prev = 10    Active Range of Motion      Right Knee   Supine Flexion: : 98 degrees, 3/21: 100-103 deg (pain)   Prone Flexion: 3/21: 85 deg    KOS: : 31%    Functional outcomes   Gait speed: : 8 87 sec or 0 69m/s, 3/21: 7 7 sec or 0 8 m/s  5x sit to stand: : 22 (wide ROWENA) (seconds), 3/21: 19 sec    TU/7: 16 09 sec, no AD (seconds), 3/21:13 56 sec, no AD     FGA: : , 3/21:   2MWT: : 1 min 30 sec, 200 ft with no AD, 3/21: 1 min 30 min, 200 ft    Precautions: venous insufficiency - venous ulcer of R ankle, HTN, DM, CHF, pacemaker     Specialty Daily Treatment Diary      Manual   2/21  2/25  2/28  3/14      R knee flexion mobs, grade III 30" x 3 30" x 3  30" x 3       R patella mobs       3 min                                                      Exercise Diary  3/21     3/14   Nustep      L6, 10 min    Step ups - fw/lat         Step downs         Tandem stance       Hurdles         Ambulation with HT/HN         Static balance      FTEC - airex w/ HT/HN 30" x 3   Gastroc S      30" x 2   Mod noelle S         HS S       30" x 3 manual   Bridges         B/L Prone k' flexion S w/ strap      30" x 2    BW ambulation         Sidestepping       GTB around ankles 3 laps    R SLR - sidelying abduction          marching          cone taps      15x    semi tandem stance      Foam, EC, 30''x2    STS 10x                           Modalities                                                              Assessment: Progress note performed this session  Pt continues to demo significant limitation in knee flexion ROM and report high knee pain which makes it difficulty for him to ambulate down steps  Overall reported improvement in functional activities per PSFS and subjective report fátima in regards to his balance  He demo improvement in gait speed and some balance per FGA but not significant and still limited in endurance/activity tolerance, requiring frequent rest breaks throughout testing and session due to SOB  He is still considered a high fall risk per 5 x STS, TUG, and FGA  Knee pain and vascular disease contributes significantly to balance impairment and gait dysfunction  At this time, pt would benefit from continued PT 2x/week for 4 more weeks focusing on R knee to manage pain, improve ROM, and improve overall balance to maximize functional mobility and safety and improve QOL  Plan: Progress treatment as tolerated

## 2019-03-25 ENCOUNTER — OFFICE VISIT (OUTPATIENT)
Dept: PHYSICAL THERAPY | Facility: CLINIC | Age: 84
End: 2019-03-25
Payer: MEDICARE

## 2019-03-25 DIAGNOSIS — R26.2 AMBULATORY DYSFUNCTION: Primary | ICD-10-CM

## 2019-03-25 DIAGNOSIS — M17.11 OSTEOARTHRITIS OF RIGHT KNEE, UNSPECIFIED OSTEOARTHRITIS TYPE: ICD-10-CM

## 2019-03-25 DIAGNOSIS — Z74.09 DECREASED FUNCTIONAL MOBILITY AND ENDURANCE: ICD-10-CM

## 2019-03-25 PROCEDURE — 97140 MANUAL THERAPY 1/> REGIONS: CPT | Performed by: PHYSICAL THERAPIST

## 2019-03-25 PROCEDURE — 97112 NEUROMUSCULAR REEDUCATION: CPT | Performed by: PHYSICAL THERAPIST

## 2019-03-25 PROCEDURE — 97110 THERAPEUTIC EXERCISES: CPT | Performed by: PHYSICAL THERAPIST

## 2019-03-25 NOTE — PROGRESS NOTES
Daily Note     Today's date: 3/25/2019  Patient name: Mili Melchor  : 1935  MRN: [id number]  Referring provider: Donovan Castillo MD  Dx:   Encounter Diagnosis     ICD-10-CM    1  Ambulatory dysfunction R26 2    2  Decreased functional mobility and endurance Z74 09    3  Osteoarthritis of right knee, unspecified osteoarthritis type M17 11                   Subjective: Pt reports 6-7/10 R knee pain today  Objective: See treatment diary below    Precautions: venous insufficiency - venous ulcer of R ankle, HTN, DM, CHF, pacemaker     Specialty Daily Treatment Diary      Manual   2/21  2/25  2/28  3/14  3/25    R knee flexion mobs, grade III 30" x 3 30" x 3  30" x 3   30" x 3   R patella mobs       3 min   3 min all directions                                                   Exercise Diary  3/21  3/25      3/14   Nustep    L6, 10 min       L6, 10 min    Step ups - fw/lat             Step downs             Tandem stance             Hurdles             Ambulation with HT/HN             Static balance          FTEC - airex w/ HT/HN 30" x 3   Gastroc S    30" x 3      30" x 2   Mod noelle S             HS S    30" x 3       30" x 3 manual   Bridges             R Prone k' flexion S w/ strap    30" x 3      30" x 2    BW ambulation             Sidestepping   GTB around ankles 3 laps     Foam beams 2 laps       GTB around ankles 3 laps    R SLR - sidelying abduction              marching              cone taps          15x    semi tandem stance    Foam, EC, 30''x2      Foam, EC, 30''x2    STS 10x           R TKE   15x, 3" holds BTB               Modalities                                                            Assessment: Increased R knee pain today so pt overall had increased difficulty with weightbearing activities and balance  Patient would benefit from continued PT      Plan: Progress treatment as tolerated

## 2019-03-28 ENCOUNTER — OFFICE VISIT (OUTPATIENT)
Dept: PHYSICAL THERAPY | Facility: CLINIC | Age: 84
End: 2019-03-28
Payer: MEDICARE

## 2019-03-28 DIAGNOSIS — Z74.09 DECREASED FUNCTIONAL MOBILITY AND ENDURANCE: ICD-10-CM

## 2019-03-28 DIAGNOSIS — M17.11 OSTEOARTHRITIS OF RIGHT KNEE, UNSPECIFIED OSTEOARTHRITIS TYPE: ICD-10-CM

## 2019-03-28 DIAGNOSIS — R26.2 AMBULATORY DYSFUNCTION: Primary | ICD-10-CM

## 2019-03-28 PROCEDURE — 97110 THERAPEUTIC EXERCISES: CPT

## 2019-03-28 PROCEDURE — 97112 NEUROMUSCULAR REEDUCATION: CPT

## 2019-03-28 NOTE — PROGRESS NOTES
Daily Note     Today's date: 3/28/2019  Patient name: Ish Chery  : 1935  MRN: [id number]  Referring provider: Mirella Méndez MD  Dx:   Encounter Diagnosis     ICD-10-CM    1  Ambulatory dysfunction R26 2    2  Decreased functional mobility and endurance Z74 09    3  Osteoarthritis of right knee, unspecified osteoarthritis type M17 11          Subjective: Pt reports 2-3/10 R knee when he is sitting, but 6/10 pain with standing and weight bearing  Objective: See treatment diary below    Precautions: venous insufficiency - venous ulcer of R ankle, HTN, DM, CHF, pacemaker     Specialty Daily Treatment Diary      Manual  3/28        R knee flexion mobs, grade III        R patella ROM 3 min                                               Exercise Diary  3/21  3/25  3/28      Nustep    L6, 10 min   L6, 10 min       Step ups - fw/lat            Step downs            Tandem stance            Hurdles            Ambulation with HT/HN            Static balance            Gastroc S    30" x 3  30''x2      Mod noelle S            HS S    30" x 3  30''x2      Bridges            R Prone k' flexion S w/ strap    30" x 3         BW ambulation            Sidestepping   GTB around ankles 3 laps     Foam beams 2 laps  GTB around ankles, foam beams 3 laps        R SLR - sidelying abduction             marching             cone taps            Tandem walking   Foam, 3 laps      semi tandem stance    Foam, EC, 30''x2  Foam, EC, 30''x2       STS 10x          LAQ   3#, RLE, 3x10     SLR   2X10     R TKE   15x, 3" holds BTB  2X15, 3" holds BTB            Modalities                                                            Assessment: Patient has reports of R knee pain throughout session  Patient demo difficulty picking up his feet with sidestepping on foam  Requires fingertip assist with tandem and sidestepping on foam  Noted muscular fatigue with LAQ   Demo decreased ROM, tenderness, and discomfort with patellar ROM       Plan: Progress treatment as tolerated

## 2019-04-01 ENCOUNTER — OFFICE VISIT (OUTPATIENT)
Dept: PHYSICAL THERAPY | Facility: CLINIC | Age: 84
End: 2019-04-01
Payer: MEDICARE

## 2019-04-01 DIAGNOSIS — R26.2 AMBULATORY DYSFUNCTION: Primary | ICD-10-CM

## 2019-04-01 DIAGNOSIS — M17.11 OSTEOARTHRITIS OF RIGHT KNEE, UNSPECIFIED OSTEOARTHRITIS TYPE: ICD-10-CM

## 2019-04-01 DIAGNOSIS — Z74.09 DECREASED FUNCTIONAL MOBILITY AND ENDURANCE: ICD-10-CM

## 2019-04-01 PROCEDURE — 97112 NEUROMUSCULAR REEDUCATION: CPT | Performed by: PHYSICAL THERAPIST

## 2019-04-01 PROCEDURE — 97150 GROUP THERAPEUTIC PROCEDURES: CPT | Performed by: PHYSICAL THERAPIST

## 2019-04-04 ENCOUNTER — OFFICE VISIT (OUTPATIENT)
Dept: PHYSICAL THERAPY | Facility: CLINIC | Age: 84
End: 2019-04-04
Payer: MEDICARE

## 2019-04-04 DIAGNOSIS — Z74.09 DECREASED FUNCTIONAL MOBILITY AND ENDURANCE: ICD-10-CM

## 2019-04-04 DIAGNOSIS — M17.11 OSTEOARTHRITIS OF RIGHT KNEE, UNSPECIFIED OSTEOARTHRITIS TYPE: ICD-10-CM

## 2019-04-04 DIAGNOSIS — R26.2 AMBULATORY DYSFUNCTION: Primary | ICD-10-CM

## 2019-04-04 PROCEDURE — 97140 MANUAL THERAPY 1/> REGIONS: CPT | Performed by: PHYSICAL THERAPIST

## 2019-04-04 PROCEDURE — 97110 THERAPEUTIC EXERCISES: CPT | Performed by: PHYSICAL THERAPIST

## 2019-04-08 ENCOUNTER — OFFICE VISIT (OUTPATIENT)
Dept: PHYSICAL THERAPY | Facility: CLINIC | Age: 84
End: 2019-04-08
Payer: MEDICARE

## 2019-04-08 DIAGNOSIS — M17.11 OSTEOARTHRITIS OF RIGHT KNEE, UNSPECIFIED OSTEOARTHRITIS TYPE: ICD-10-CM

## 2019-04-08 DIAGNOSIS — Z74.09 DECREASED FUNCTIONAL MOBILITY AND ENDURANCE: ICD-10-CM

## 2019-04-08 DIAGNOSIS — R26.2 AMBULATORY DYSFUNCTION: Primary | ICD-10-CM

## 2019-04-08 PROCEDURE — 97110 THERAPEUTIC EXERCISES: CPT | Performed by: PHYSICAL THERAPIST

## 2019-04-11 ENCOUNTER — OFFICE VISIT (OUTPATIENT)
Dept: PHYSICAL THERAPY | Facility: CLINIC | Age: 84
End: 2019-04-11
Payer: MEDICARE

## 2019-04-11 DIAGNOSIS — M17.11 OSTEOARTHRITIS OF RIGHT KNEE, UNSPECIFIED OSTEOARTHRITIS TYPE: ICD-10-CM

## 2019-04-11 DIAGNOSIS — R26.2 AMBULATORY DYSFUNCTION: Primary | ICD-10-CM

## 2019-04-11 DIAGNOSIS — Z74.09 DECREASED FUNCTIONAL MOBILITY AND ENDURANCE: ICD-10-CM

## 2019-04-11 PROCEDURE — 97150 GROUP THERAPEUTIC PROCEDURES: CPT

## 2019-04-11 PROCEDURE — 97110 THERAPEUTIC EXERCISES: CPT | Performed by: PHYSICAL THERAPIST

## 2019-04-11 PROCEDURE — 97140 MANUAL THERAPY 1/> REGIONS: CPT | Performed by: PHYSICAL THERAPIST

## 2019-04-13 LAB — HBA1C MFR BLD HPLC: 6.7 %

## 2019-04-15 ENCOUNTER — OFFICE VISIT (OUTPATIENT)
Dept: PHYSICAL THERAPY | Facility: CLINIC | Age: 84
End: 2019-04-15
Payer: MEDICARE

## 2019-04-15 DIAGNOSIS — Z74.09 DECREASED FUNCTIONAL MOBILITY AND ENDURANCE: ICD-10-CM

## 2019-04-15 DIAGNOSIS — M17.11 OSTEOARTHRITIS OF RIGHT KNEE, UNSPECIFIED OSTEOARTHRITIS TYPE: ICD-10-CM

## 2019-04-15 DIAGNOSIS — R26.2 AMBULATORY DYSFUNCTION: Primary | ICD-10-CM

## 2019-04-15 PROCEDURE — 97150 GROUP THERAPEUTIC PROCEDURES: CPT | Performed by: PHYSICAL THERAPIST

## 2019-04-15 PROCEDURE — 97110 THERAPEUTIC EXERCISES: CPT | Performed by: PHYSICAL THERAPIST

## 2019-04-18 ENCOUNTER — APPOINTMENT (OUTPATIENT)
Dept: PHYSICAL THERAPY | Facility: CLINIC | Age: 84
End: 2019-04-18
Payer: MEDICARE

## 2019-04-22 ENCOUNTER — EVALUATION (OUTPATIENT)
Dept: PHYSICAL THERAPY | Facility: CLINIC | Age: 84
End: 2019-04-22
Payer: MEDICARE

## 2019-04-22 DIAGNOSIS — M17.11 OSTEOARTHRITIS OF RIGHT KNEE, UNSPECIFIED OSTEOARTHRITIS TYPE: ICD-10-CM

## 2019-04-22 DIAGNOSIS — Z74.09 DECREASED FUNCTIONAL MOBILITY AND ENDURANCE: ICD-10-CM

## 2019-04-22 DIAGNOSIS — R26.2 AMBULATORY DYSFUNCTION: Primary | ICD-10-CM

## 2019-04-22 PROCEDURE — 97112 NEUROMUSCULAR REEDUCATION: CPT | Performed by: PHYSICAL THERAPIST

## 2019-04-22 PROCEDURE — 97110 THERAPEUTIC EXERCISES: CPT | Performed by: PHYSICAL THERAPIST

## 2019-04-24 ENCOUNTER — HOSPITAL ENCOUNTER (OUTPATIENT)
Dept: NON INVASIVE DIAGNOSTICS | Facility: CLINIC | Age: 84
Discharge: HOME | End: 2019-04-24
Payer: MEDICARE

## 2019-04-24 DIAGNOSIS — Z95.2 S/P AVR: ICD-10-CM

## 2019-04-24 PROCEDURE — 93306 TTE W/DOPPLER COMPLETE: CPT

## 2019-04-24 PROCEDURE — 93306 TTE W/DOPPLER COMPLETE: CPT | Performed by: INTERNAL MEDICINE

## 2019-04-25 ENCOUNTER — APPOINTMENT (OUTPATIENT)
Dept: PHYSICAL THERAPY | Facility: CLINIC | Age: 84
End: 2019-04-25
Payer: MEDICARE

## 2019-04-26 ENCOUNTER — TELEPHONE (OUTPATIENT)
Dept: CARDIOLOGY CLINIC | Facility: CLINIC | Age: 84
End: 2019-04-26

## 2019-04-29 ENCOUNTER — REMOTE DEVICE CLINIC VISIT (OUTPATIENT)
Dept: CARDIOLOGY CLINIC | Facility: CLINIC | Age: 84
End: 2019-04-29
Payer: MEDICARE

## 2019-04-29 ENCOUNTER — APPOINTMENT (OUTPATIENT)
Dept: PHYSICAL THERAPY | Facility: CLINIC | Age: 84
End: 2019-04-29
Payer: MEDICARE

## 2019-04-29 DIAGNOSIS — I48.0 PAROXYSMAL ATRIAL FIBRILLATION (HCC): ICD-10-CM

## 2019-04-29 DIAGNOSIS — Z95.810 AICD (AUTOMATIC CARDIOVERTER/DEFIBRILLATOR) PRESENT: ICD-10-CM

## 2019-04-29 DIAGNOSIS — I47.2 VENTRICULAR TACHYARRHYTHMIA (HCC): Primary | ICD-10-CM

## 2019-04-29 PROCEDURE — 93295 DEV INTERROG REMOTE 1/2/MLT: CPT | Performed by: INTERNAL MEDICINE

## 2019-04-29 PROCEDURE — 93296 REM INTERROG EVL PM/IDS: CPT | Performed by: INTERNAL MEDICINE

## 2019-05-15 ENCOUNTER — TRANSCRIBE ORDERS (OUTPATIENT)
Dept: PHYSICAL THERAPY | Facility: REHABILITATION | Age: 84
End: 2019-05-15

## 2019-05-15 ENCOUNTER — EVALUATION (OUTPATIENT)
Dept: PHYSICAL THERAPY | Facility: REHABILITATION | Age: 84
End: 2019-05-15
Payer: MEDICARE

## 2019-05-15 DIAGNOSIS — M17.11 OSTEOARTHRITIS OF RIGHT KNEE, UNSPECIFIED OSTEOARTHRITIS TYPE: Primary | ICD-10-CM

## 2019-05-15 PROCEDURE — 97161 PT EVAL LOW COMPLEX 20 MIN: CPT | Performed by: PHYSICAL THERAPIST

## 2019-05-20 ENCOUNTER — OFFICE VISIT (OUTPATIENT)
Dept: PHYSICAL THERAPY | Facility: REHABILITATION | Age: 84
End: 2019-05-20
Payer: MEDICARE

## 2019-05-20 DIAGNOSIS — M17.11 OSTEOARTHRITIS OF RIGHT KNEE, UNSPECIFIED OSTEOARTHRITIS TYPE: Primary | ICD-10-CM

## 2019-05-20 DIAGNOSIS — R26.2 AMBULATORY DYSFUNCTION: ICD-10-CM

## 2019-05-20 DIAGNOSIS — Z74.09 DECREASED FUNCTIONAL MOBILITY AND ENDURANCE: ICD-10-CM

## 2019-05-20 PROCEDURE — 97110 THERAPEUTIC EXERCISES: CPT

## 2019-05-20 PROCEDURE — 97112 NEUROMUSCULAR REEDUCATION: CPT

## 2019-05-22 LAB
LEFT EYE DIABETIC RETINOPATHY: NORMAL
RIGHT EYE DIABETIC RETINOPATHY: NORMAL

## 2019-05-23 ENCOUNTER — OFFICE VISIT (OUTPATIENT)
Dept: PHYSICAL THERAPY | Facility: REHABILITATION | Age: 84
End: 2019-05-23
Payer: MEDICARE

## 2019-05-23 DIAGNOSIS — R26.2 AMBULATORY DYSFUNCTION: ICD-10-CM

## 2019-05-23 DIAGNOSIS — M17.11 OSTEOARTHRITIS OF RIGHT KNEE, UNSPECIFIED OSTEOARTHRITIS TYPE: Primary | ICD-10-CM

## 2019-05-23 DIAGNOSIS — Z74.09 DECREASED FUNCTIONAL MOBILITY AND ENDURANCE: ICD-10-CM

## 2019-05-23 PROCEDURE — 97112 NEUROMUSCULAR REEDUCATION: CPT

## 2019-05-23 PROCEDURE — 97110 THERAPEUTIC EXERCISES: CPT

## 2019-05-28 ENCOUNTER — APPOINTMENT (OUTPATIENT)
Dept: PHYSICAL THERAPY | Facility: REHABILITATION | Age: 84
End: 2019-05-28
Payer: MEDICARE

## 2019-05-30 ENCOUNTER — OFFICE VISIT (OUTPATIENT)
Dept: PHYSICAL THERAPY | Facility: REHABILITATION | Age: 84
End: 2019-05-30
Payer: MEDICARE

## 2019-05-30 DIAGNOSIS — R26.2 AMBULATORY DYSFUNCTION: ICD-10-CM

## 2019-05-30 DIAGNOSIS — Z74.09 DECREASED FUNCTIONAL MOBILITY AND ENDURANCE: ICD-10-CM

## 2019-05-30 DIAGNOSIS — M17.11 OSTEOARTHRITIS OF RIGHT KNEE, UNSPECIFIED OSTEOARTHRITIS TYPE: Primary | ICD-10-CM

## 2019-05-30 PROCEDURE — 97110 THERAPEUTIC EXERCISES: CPT

## 2019-05-30 PROCEDURE — 97112 NEUROMUSCULAR REEDUCATION: CPT

## 2019-06-03 ENCOUNTER — OFFICE VISIT (OUTPATIENT)
Dept: PHYSICAL THERAPY | Facility: REHABILITATION | Age: 84
End: 2019-06-03
Payer: MEDICARE

## 2019-06-03 DIAGNOSIS — M17.11 OSTEOARTHRITIS OF RIGHT KNEE, UNSPECIFIED OSTEOARTHRITIS TYPE: Primary | ICD-10-CM

## 2019-06-03 DIAGNOSIS — R26.2 AMBULATORY DYSFUNCTION: ICD-10-CM

## 2019-06-03 DIAGNOSIS — Z74.09 DECREASED FUNCTIONAL MOBILITY AND ENDURANCE: ICD-10-CM

## 2019-06-03 PROCEDURE — 97110 THERAPEUTIC EXERCISES: CPT | Performed by: PHYSICAL THERAPIST

## 2019-06-03 PROCEDURE — 97112 NEUROMUSCULAR REEDUCATION: CPT | Performed by: PHYSICAL THERAPIST

## 2019-06-04 ENCOUNTER — OFFICE VISIT (OUTPATIENT)
Dept: INTERNAL MEDICINE CLINIC | Facility: CLINIC | Age: 84
End: 2019-06-04
Payer: MEDICARE

## 2019-06-04 VITALS
TEMPERATURE: 98.4 F | DIASTOLIC BLOOD PRESSURE: 68 MMHG | HEIGHT: 73 IN | SYSTOLIC BLOOD PRESSURE: 146 MMHG | BODY MASS INDEX: 41.75 KG/M2 | HEART RATE: 82 BPM | OXYGEN SATURATION: 99 % | RESPIRATION RATE: 16 BRPM | WEIGHT: 315 LBS

## 2019-06-04 DIAGNOSIS — E11.29 TYPE 2 DIABETES MELLITUS WITH MICROALBUMINURIA, WITHOUT LONG-TERM CURRENT USE OF INSULIN (HCC): Primary | ICD-10-CM

## 2019-06-04 DIAGNOSIS — I48.0 PAF (PAROXYSMAL ATRIAL FIBRILLATION) (HCC): ICD-10-CM

## 2019-06-04 DIAGNOSIS — E66.01 MORBID OBESITY (HCC): ICD-10-CM

## 2019-06-04 DIAGNOSIS — Z23 NEED FOR TDAP VACCINATION: ICD-10-CM

## 2019-06-04 DIAGNOSIS — R80.9 TYPE 2 DIABETES MELLITUS WITH MICROALBUMINURIA, WITHOUT LONG-TERM CURRENT USE OF INSULIN (HCC): Primary | ICD-10-CM

## 2019-06-04 DIAGNOSIS — Z23 NEED FOR STREPTOCOCCUS PNEUMONIAE VACCINATION: ICD-10-CM

## 2019-06-04 LAB
CREAT UR-MCNC: 107 MG/DL
MICROALBUMIN UR-MCNC: 14.4 MG/L (ref 0–20)
MICROALBUMIN/CREAT 24H UR: 13 MG/G CREATININE (ref 0–30)
SL AMB POCT HEMOGLOBIN AIC: 6.2 (ref ?–6.5)

## 2019-06-04 PROCEDURE — 90670 PCV13 VACCINE IM: CPT

## 2019-06-04 PROCEDURE — G0009 ADMIN PNEUMOCOCCAL VACCINE: HCPCS

## 2019-06-04 PROCEDURE — 90715 TDAP VACCINE 7 YRS/> IM: CPT

## 2019-06-04 PROCEDURE — 82043 UR ALBUMIN QUANTITATIVE: CPT | Performed by: NURSE PRACTITIONER

## 2019-06-04 PROCEDURE — 83036 HEMOGLOBIN GLYCOSYLATED A1C: CPT | Performed by: NURSE PRACTITIONER

## 2019-06-04 PROCEDURE — 82570 ASSAY OF URINE CREATININE: CPT | Performed by: NURSE PRACTITIONER

## 2019-06-04 PROCEDURE — 90471 IMMUNIZATION ADMIN: CPT

## 2019-06-04 PROCEDURE — 99204 OFFICE O/P NEW MOD 45 MIN: CPT | Performed by: NURSE PRACTITIONER

## 2019-06-04 RX ORDER — AMOXICILLIN 500 MG/1
CAPSULE ORAL
Refills: 3 | COMMUNITY
Start: 2019-05-30

## 2019-06-06 ENCOUNTER — OFFICE VISIT (OUTPATIENT)
Dept: PHYSICAL THERAPY | Facility: REHABILITATION | Age: 84
End: 2019-06-06
Payer: MEDICARE

## 2019-06-06 DIAGNOSIS — M17.11 OSTEOARTHRITIS OF RIGHT KNEE, UNSPECIFIED OSTEOARTHRITIS TYPE: Primary | ICD-10-CM

## 2019-06-06 DIAGNOSIS — Z74.09 DECREASED FUNCTIONAL MOBILITY AND ENDURANCE: ICD-10-CM

## 2019-06-06 DIAGNOSIS — R26.2 AMBULATORY DYSFUNCTION: ICD-10-CM

## 2019-06-06 PROCEDURE — 97112 NEUROMUSCULAR REEDUCATION: CPT

## 2019-06-06 PROCEDURE — 97110 THERAPEUTIC EXERCISES: CPT

## 2019-06-10 ENCOUNTER — OFFICE VISIT (OUTPATIENT)
Dept: PHYSICAL THERAPY | Facility: REHABILITATION | Age: 84
End: 2019-06-10
Payer: MEDICARE

## 2019-06-10 DIAGNOSIS — Z74.09 DECREASED FUNCTIONAL MOBILITY AND ENDURANCE: ICD-10-CM

## 2019-06-10 DIAGNOSIS — R26.2 AMBULATORY DYSFUNCTION: ICD-10-CM

## 2019-06-10 DIAGNOSIS — M17.11 OSTEOARTHRITIS OF RIGHT KNEE, UNSPECIFIED OSTEOARTHRITIS TYPE: Primary | ICD-10-CM

## 2019-06-10 PROCEDURE — 97110 THERAPEUTIC EXERCISES: CPT

## 2019-06-10 PROCEDURE — 97112 NEUROMUSCULAR REEDUCATION: CPT

## 2019-06-13 ENCOUNTER — OFFICE VISIT (OUTPATIENT)
Dept: PHYSICAL THERAPY | Facility: REHABILITATION | Age: 84
End: 2019-06-13
Payer: MEDICARE

## 2019-06-13 DIAGNOSIS — M17.11 OSTEOARTHRITIS OF RIGHT KNEE, UNSPECIFIED OSTEOARTHRITIS TYPE: Primary | ICD-10-CM

## 2019-06-13 DIAGNOSIS — R26.2 AMBULATORY DYSFUNCTION: ICD-10-CM

## 2019-06-13 DIAGNOSIS — Z74.09 DECREASED FUNCTIONAL MOBILITY AND ENDURANCE: ICD-10-CM

## 2019-06-13 PROCEDURE — 97112 NEUROMUSCULAR REEDUCATION: CPT

## 2019-06-13 PROCEDURE — 97110 THERAPEUTIC EXERCISES: CPT

## 2019-06-17 ENCOUNTER — APPOINTMENT (OUTPATIENT)
Dept: PHYSICAL THERAPY | Facility: REHABILITATION | Age: 84
End: 2019-06-17
Payer: MEDICARE

## 2019-06-17 LAB
INR PPP: 2.6
PROTHROMBIN TIME: 26.5 SEC (ref 9–11.5)

## 2019-06-18 ENCOUNTER — ANTICOAG VISIT (OUTPATIENT)
Dept: INTERNAL MEDICINE CLINIC | Facility: CLINIC | Age: 84
End: 2019-06-18

## 2019-06-20 ENCOUNTER — EVALUATION (OUTPATIENT)
Dept: PHYSICAL THERAPY | Facility: REHABILITATION | Age: 84
End: 2019-06-20
Payer: MEDICARE

## 2019-06-20 DIAGNOSIS — M17.11 OSTEOARTHRITIS OF RIGHT KNEE, UNSPECIFIED OSTEOARTHRITIS TYPE: Primary | ICD-10-CM

## 2019-06-20 PROCEDURE — 97112 NEUROMUSCULAR REEDUCATION: CPT | Performed by: PHYSICAL THERAPIST

## 2019-06-20 PROCEDURE — 97110 THERAPEUTIC EXERCISES: CPT | Performed by: PHYSICAL THERAPIST

## 2019-06-21 ENCOUNTER — TRANSCRIBE ORDERS (OUTPATIENT)
Dept: PHYSICAL THERAPY | Facility: REHABILITATION | Age: 84
End: 2019-06-21

## 2019-06-21 DIAGNOSIS — M17.11 OSTEOARTHRITIS OF RIGHT KNEE, UNSPECIFIED OSTEOARTHRITIS TYPE: Primary | ICD-10-CM

## 2019-06-24 ENCOUNTER — OFFICE VISIT (OUTPATIENT)
Dept: PHYSICAL THERAPY | Facility: REHABILITATION | Age: 84
End: 2019-06-24
Payer: MEDICARE

## 2019-06-24 DIAGNOSIS — M17.11 OSTEOARTHRITIS OF RIGHT KNEE, UNSPECIFIED OSTEOARTHRITIS TYPE: Primary | ICD-10-CM

## 2019-06-24 PROCEDURE — 97112 NEUROMUSCULAR REEDUCATION: CPT | Performed by: PHYSICAL THERAPIST

## 2019-06-27 ENCOUNTER — OFFICE VISIT (OUTPATIENT)
Dept: PHYSICAL THERAPY | Facility: REHABILITATION | Age: 84
End: 2019-06-27
Payer: MEDICARE

## 2019-06-27 DIAGNOSIS — R26.2 AMBULATORY DYSFUNCTION: ICD-10-CM

## 2019-06-27 DIAGNOSIS — Z74.09 DECREASED FUNCTIONAL MOBILITY AND ENDURANCE: ICD-10-CM

## 2019-06-27 DIAGNOSIS — M17.11 OSTEOARTHRITIS OF RIGHT KNEE, UNSPECIFIED OSTEOARTHRITIS TYPE: Primary | ICD-10-CM

## 2019-06-27 PROCEDURE — 97110 THERAPEUTIC EXERCISES: CPT

## 2019-06-27 PROCEDURE — 97112 NEUROMUSCULAR REEDUCATION: CPT

## 2019-07-01 ENCOUNTER — OFFICE VISIT (OUTPATIENT)
Dept: PHYSICAL THERAPY | Facility: REHABILITATION | Age: 84
End: 2019-07-01
Payer: MEDICARE

## 2019-07-01 DIAGNOSIS — M17.11 OSTEOARTHRITIS OF RIGHT KNEE, UNSPECIFIED OSTEOARTHRITIS TYPE: Primary | ICD-10-CM

## 2019-07-01 DIAGNOSIS — Z74.09 DECREASED FUNCTIONAL MOBILITY AND ENDURANCE: ICD-10-CM

## 2019-07-01 DIAGNOSIS — R26.2 AMBULATORY DYSFUNCTION: ICD-10-CM

## 2019-07-01 PROCEDURE — 97112 NEUROMUSCULAR REEDUCATION: CPT

## 2019-07-01 PROCEDURE — 97140 MANUAL THERAPY 1/> REGIONS: CPT

## 2019-07-01 NOTE — PROGRESS NOTES
Daily Note     Today's date: 2019  Patient name: Diane Domingo  : 1935  MRN: [id number]  Referring provider: yK Lantigua MD  Dx:   Encounter Diagnosis     ICD-10-CM    1  Osteoarthritis of right knee, unspecified osteoarthritis type M17 11    2  Ambulatory dysfunction R26 2    3  Decreased functional mobility and endurance Z74 09                   Subjective: Pt reported feeling okay today  Intermittent discomfort in medial knee  Objective: See treatment diary below  [facility]  5/20  5/23  5/30  6/3  6/6  6/10  6/13  6/20  6/24  6/27 7/1   Posterior right tibiofemoral JM's Gr 3  TP  TP  TP  DD  TP  TP  TP  TP  TP  np TP   Patellar ROM PRN                                                                                                         Exercise Diary   5/20  5/23  5/30  6/3  6/6  6/10  6/13  6/20  6/24  6/27 7/1   Nustep 10'  10'  10'  10' L4  L5 x10'   L5 x10'  L5 x10'  L5x10'  L5x10'  L5 x10' L5 x10'   Heel slides 10"x10  10"x10  10"x10  5" 20x  10"x10  10"x10  10"x10  10"x10  10"x10  10"x10 10"x10   Bridges  3"x10  3"x10  3"x10  3"x10  3'x10  3"x10  3"x10  3"x15  3"x15  3"x15 3"x15   SLR flex  10  10  10  10x2  10x2  10x2  10x2  2x10  2x10  2x10 20   Clamshells                x10  x15  15 15   LAQ  3"x10  3"x10  3"x10  3" 2x10  3" 2x10  3"x20  3"x20  3"x25 3"x25  3"x25 3"x25   HS curls  10  10  10  10x2 yell TB  10x2  10x2  10x2  2x10  2x10  2x10 20   Step ups                4"x10  4"x15  4"x15 4"x15   Mini squats pain-free  10  10  10  10x2  10x2  10x2  10x2  2x10  2x10  20 20   Sit to stand                x10 w/ biodex foam  x10 w/biodex foam  10 biodex foam 10 biodex foam   Side stepping          x1  x2  x2  x2  2 laps  x2 x3   Biodex:  LOS                        Biodex:  RC                        Standing hip ABD  10  10  10  10x2  10x2  10x2  10x2  2x10  2x10ea  20 ea   20 ea                                                                                                                                                                Modalities   5/20                     CP PRN  def                                                                            Assessment: Tolerated treatment well  Patient demonstrated fatigue post treatment and exhibited good technique with therapeutic exercises  Vc's needed for correct technique throughout session  Improved tolerance with exercises  Continue to progress as tolerated  Plan: Continue per plan of care

## 2019-07-03 ENCOUNTER — OFFICE VISIT (OUTPATIENT)
Dept: PHYSICAL THERAPY | Facility: REHABILITATION | Age: 84
End: 2019-07-03
Payer: MEDICARE

## 2019-07-03 DIAGNOSIS — M17.11 OSTEOARTHRITIS OF RIGHT KNEE, UNSPECIFIED OSTEOARTHRITIS TYPE: Primary | ICD-10-CM

## 2019-07-03 DIAGNOSIS — Z74.09 DECREASED FUNCTIONAL MOBILITY AND ENDURANCE: ICD-10-CM

## 2019-07-03 DIAGNOSIS — R26.2 AMBULATORY DYSFUNCTION: ICD-10-CM

## 2019-07-03 PROCEDURE — 97140 MANUAL THERAPY 1/> REGIONS: CPT

## 2019-07-03 PROCEDURE — 97112 NEUROMUSCULAR REEDUCATION: CPT

## 2019-07-03 NOTE — PROGRESS NOTES
Daily Note     Today's date: 7/3/2019  Patient name: Diane Domingo  : 1935  MRN: [id number]  Referring provider: Ky Lantigua MD  Dx:   Encounter Diagnosis     ICD-10-CM    1  Osteoarthritis of right knee, unspecified osteoarthritis type M17 11    2  Ambulatory dysfunction R26 2    3  Decreased functional mobility and endurance Z74 09                   Subjective: Pt reported intermittent discomfort in knee  Objective: See treatment diary below  [facility]  5/20  5/23  5/30  6/3  6/6  6/10  6/13  6/20  6/24  6/27 7/1 7/3   Posterior right tibiofemoral JM's Gr 3  TP  TP  TP  DD  TP  TP  TP  TP  TP  np TP TP   Patellar ROM PRN                                                                                                                 Exercise Diary   5/20  5/23  5/30  6/3  6/6  6/10  6/13  6/20  6/24  6/27 7/1 7/3   Nustep 10'  10'  10'  10' L4  L5 x10'   L5 x10'  L5 x10'  L5x10'  L5x10'  L5 x10' L5 x10' L7x10'   Heel slides 10"x10  10"x10  10"x10  5" 20x  10"x10  10"x10  10"x10  10"x10  10"x10  10"x10 10"x10 10"x10   Bridges  3"x10  3"x10  3"x10  3"x10  3'x10  3"x10  3"x10  3"x15  3"x15  3"x15 3"x15 3"x15   SLR flex  10  10  10  10x2  10x2  10x2  10x2  2x10  2x10  2x10 20 20   Clamshells                x10  x15  15 15 15   LAQ  3"x10  3"x10  3"x10  3" 2x10  3" 2x10  3"x20  3"x20  3"x25 3"x25  3"x25 3"x25 3"x25   HS curls  10  10  10  10x2 yell TB  10x2  10x2  10x2  2x10  2x10  2x10 20 20   Step ups                4"x10  4"x15  4"x15 4"x15 4"x15   Mini squats pain-free  10  10  10  10x2  10x2  10x2  10x2  2x10  2x10  20 20 20   Sit to stand                x10 w/ biodex foam  x10 w/biodex foam  10 biodex foam 10 biodex foam 10 biodex foam   Side stepping          x1  x2  x2  x2  2 laps  x2 x3 x3   Biodex:  LOS                          Biodex:  RC                          Standing hip ABD  10  10  10  10x2  10x2  10x2  10x2  2x10  2x10ea  20 ea  20 ea   20 ea                                                                                                                                                                            Modalities   5/20                     CP PRN  def                                                                            Assessment: Tolerated treatment well  Patient demonstrated fatigue post treatment and exhibited good technique with therapeutic exercises  Vc's needed for correct technique throughout session  No reports of increased pain throughout session  Plan: Continue per plan of care   1 on 1 with PTA and PT

## 2019-07-08 ENCOUNTER — OFFICE VISIT (OUTPATIENT)
Dept: PHYSICAL THERAPY | Facility: REHABILITATION | Age: 84
End: 2019-07-08
Payer: MEDICARE

## 2019-07-08 DIAGNOSIS — M17.11 OSTEOARTHRITIS OF RIGHT KNEE, UNSPECIFIED OSTEOARTHRITIS TYPE: Primary | ICD-10-CM

## 2019-07-08 DIAGNOSIS — R26.2 AMBULATORY DYSFUNCTION: ICD-10-CM

## 2019-07-08 DIAGNOSIS — Z74.09 DECREASED FUNCTIONAL MOBILITY AND ENDURANCE: ICD-10-CM

## 2019-07-08 PROCEDURE — 97112 NEUROMUSCULAR REEDUCATION: CPT

## 2019-07-08 NOTE — PROGRESS NOTES
Daily Note     Today's date: 2019  Patient name: Akilah Vasquez  : 1935  MRN: [id number]  Referring provider: Karol Lauren MD  Dx:   Encounter Diagnosis     ICD-10-CM    1  Osteoarthritis of right knee, unspecified osteoarthritis type M17 11    2  Ambulatory dysfunction R26 2    3  Decreased functional mobility and endurance Z74 09                   Subjective: Pt reported good and bad days  Intermittent knee soreness  Objective: See treatment diary below  [facility]                Posterior right tibiofemoral JM's Gr 3  TP              Patellar ROM PRN                                                                                                                     Exercise Diary                 Nustep L7 x10'              Heel slides 10"x10              Bridges  3"x15              SLR flex  20              Clamshells  15                    LAQ  3"x25              HS curls  20              Step ups  4"x15                    Mini squats pain-free  20              Sit to stand  10 biodex foam                    Side stepping  x3                 Biodex:  LOS                   Biodex:  RC                           Standing hip ABD  20                                                                                                                                                                                            Modalities   5/20                     CP PRN  def                                                                            Assessment: Tolerated treatment well  Patient demonstrated fatigue post treatment and exhibited good technique with therapeutic exercises  VC's needed for correct technique throughout session  Knee soreness with mini squats  Continue to progress as tolerated  Plan: Continue per plan of care

## 2019-07-09 ENCOUNTER — OFFICE VISIT (OUTPATIENT)
Dept: UROLOGY | Facility: AMBULATORY SURGERY CENTER | Age: 84
End: 2019-07-09
Payer: MEDICARE

## 2019-07-09 VITALS
SYSTOLIC BLOOD PRESSURE: 126 MMHG | HEIGHT: 73 IN | WEIGHT: 315 LBS | HEART RATE: 62 BPM | BODY MASS INDEX: 41.75 KG/M2 | DIASTOLIC BLOOD PRESSURE: 78 MMHG

## 2019-07-09 DIAGNOSIS — R31.9 URINARY TRACT INFECTION WITH HEMATURIA, SITE UNSPECIFIED: ICD-10-CM

## 2019-07-09 DIAGNOSIS — N39.0 URINARY TRACT INFECTION WITH HEMATURIA, SITE UNSPECIFIED: ICD-10-CM

## 2019-07-09 DIAGNOSIS — R31.0 GROSS HEMATURIA: Primary | ICD-10-CM

## 2019-07-09 LAB
BACTERIA UR QL AUTO: ABNORMAL /HPF
BILIRUB UR QL STRIP: NEGATIVE
CLARITY UR: CLEAR
COLOR UR: YELLOW
GLUCOSE UR STRIP-MCNC: NEGATIVE MG/DL
HGB UR QL STRIP.AUTO: NEGATIVE
HYALINE CASTS #/AREA URNS LPF: ABNORMAL /LPF
KETONES UR STRIP-MCNC: NEGATIVE MG/DL
LEUKOCYTE ESTERASE UR QL STRIP: ABNORMAL
NITRITE UR QL STRIP: NEGATIVE
NON-SQ EPI CELLS URNS QL MICRO: ABNORMAL /HPF
PH UR STRIP.AUTO: 5.5 [PH]
PROT UR STRIP-MCNC: NEGATIVE MG/DL
RBC #/AREA URNS AUTO: ABNORMAL /HPF
SL AMB  POCT GLUCOSE, UA: NORMAL
SL AMB LEUKOCYTE ESTERASE,UA: NORMAL
SL AMB POCT BILIRUBIN,UA: NORMAL
SL AMB POCT BLOOD,UA: NORMAL
SL AMB POCT CLARITY,UA: CLEAR
SL AMB POCT COLOR,UA: NORMAL
SL AMB POCT KETONES,UA: NORMAL
SL AMB POCT NITRITE,UA: NORMAL
SL AMB POCT PH,UA: 5
SL AMB POCT SPECIFIC GRAVITY,UA: 1.01
SL AMB POCT URINE PROTEIN: NORMAL
SL AMB POCT UROBILINOGEN: 0.2
SP GR UR STRIP.AUTO: 1.01 (ref 1–1.03)
UROBILINOGEN UR QL STRIP.AUTO: 0.2 E.U./DL
WBC #/AREA URNS AUTO: ABNORMAL /HPF

## 2019-07-09 PROCEDURE — 99213 OFFICE O/P EST LOW 20 MIN: CPT | Performed by: NURSE PRACTITIONER

## 2019-07-09 PROCEDURE — 87086 URINE CULTURE/COLONY COUNT: CPT | Performed by: NURSE PRACTITIONER

## 2019-07-09 PROCEDURE — 81001 URINALYSIS AUTO W/SCOPE: CPT | Performed by: NURSE PRACTITIONER

## 2019-07-09 PROCEDURE — 81002 URINALYSIS NONAUTO W/O SCOPE: CPT | Performed by: NURSE PRACTITIONER

## 2019-07-09 PROCEDURE — 88112 CYTOPATH CELL ENHANCE TECH: CPT | Performed by: PATHOLOGY

## 2019-07-09 RX ORDER — CEPHALEXIN 500 MG/1
500 CAPSULE ORAL EVERY 6 HOURS SCHEDULED
Qty: 28 CAPSULE | Refills: 0 | Status: SHIPPED | OUTPATIENT
Start: 2019-07-09 | End: 2019-07-16

## 2019-07-09 NOTE — PROGRESS NOTES
7/9/2019    Chief Complaint   Patient presents with   Shaggy Kimball Hematuria     Assessment and Plan    81 y o  male managed by Dr Thakur Rome    1  Benign prostatic hyperplasia  · Continue to monitor urinary symptoms, urinary tract infections    2  Hematuria  · Urine in office clear yellow  · Urine dip positive for trace blood, positive for leukocytes  · Cephalexin 500 mg p o  Q 6 hours x7 days prescription provided  · Instructed patient to call for concerning symptoms such as uncontrolled pain, fever, chills, worsening bleeding with urination, passage of clots, inability urinate  · Return for follow-up in 2 weeks      History of Present Illness  Erin Sandhu is a 81 y o  male here for  evaluation of  intermittent gross hematuria  Patient currently presents to the office with clear yellow urine and no complaints of hematuria  Patient reports couple of days ago noticing some light pink drainage on the tissue after cleaning himself after urinating and then more recently obvious gross blood in the toilet after urinating  He does complain of mild dysuria occasionally  He denies urinary frequency and urgency  He denies fever and chills  Patient has no current complaints of suprapubic abdominal pain or bilateral flank pain  Patient with significant past medical history diabetes, benign prostatic hyperplasia, hypertension, ICD insertion, atrial fibrillation treated with Coumadin  Most recent INR evaluated on 06/17/2019 which was 2 6  Review of Systems   Constitutional: Negative for chills and fever  Respiratory: Negative for cough and shortness of breath  Cardiovascular: Negative for chest pain  Gastrointestinal: Negative for abdominal distention, abdominal pain, blood in stool, nausea and vomiting  Genitourinary: Positive for dysuria and hematuria  Negative for difficulty urinating, enuresis, flank pain, frequency, penile pain, penile swelling, scrotal swelling, testicular pain and urgency     Skin: Negative for rash  Urinary Incontinence Screening      Most Recent Value   Urinary Incontinence   Urinary Incontinence? No   Incomplete emptying? No   Urinary frequency? Yes   Urinary urgency? No   Dysuria (painful difficult urination)? No   Nocturia (waking up to use the bathroom)? Yes [3x]   Straining (having to push to go)? No   Weak stream?  No   Intermittent stream?  No          AUA SYMPTOM SCORE      Most Recent Value   AUA SYMPTOM SCORE   How often have you had a sensation of not emptying your bladder completely after you finished urinating? 0   How often have you had to urinate again less than two hours after you finished urinating? 4   How often have you found you stopped and started again several times when you urinate?  0   How often have you found it difficult to postpone urination? 0   How often have you had a weak urinary stream?  0   How often have you had to push or strain to begin urination? 1   How many times did you most typically get up to urinate from the time you went to bed at night until the time you got up in the morning?   3   Quality of Life: If you were to spend the rest of your life with your urinary condition just the way it is now, how would you feel about that?  2   AUA SYMPTOM SCORE  8           Past Medical History  Past Medical History:   Diagnosis Date    Anemia     Arthritis     Atrial fibrillation ([facility] )     [facility]cong[facility] )     Diabetes mellitus ([facility] )     Niddm    DVT (deep vein thrombosis) in pregnancy ([facility] ) 1966    not in pregnancy    DVT (deep venous thrombosis) ([facility] ) 1966    Dyslipidemia     GERD (gastroesophageal reflux disease)     Hyperlipidemia     Hypertension     Irregular heart beat     Afib    Morbid obesity due to excess calories ([facility] )     Resolved 9/2/2014     Pulmonary embolism (HCC)     Sepsis ([facility] )     Visual impairment        Past Social History  Past Surgical History:   Procedure Laterality Date    AORTIC VALVE REPLACEMENT      CARDIAC DEFIBRILLATOR PLACEMENT  04/2014    CARDIAC SURGERY  02/2014    AVR    COLONOSCOPY      INSERT / REPLACE / REMOVE PACEMAKER      JOINT REPLACEMENT Left     LTKR    MD LIGATE/STRIP LONG SAPH VEIN BELW SEP-FEM JUNC Right 8/17/2018    Procedure: LEG PERFORATED INJECTION SCLEROTHERAPY;  Surgeon: Brock Coombs MD;  [facility];  Service: Vascular    TOTAL KNEE ARTHROPLASTY Left     VASCULAR SURGERY      VENA CAVA FILTER PLACEMENT      Interruption inferior vena cava, [place] filter, placement    WISDOM TOOTH EXTRACTION       Social History     Tobacco Use   Smoking Status Never Smoker   Smokeless Tobacco Never Used       Past Family History  Family History   Problem Relation Age of Onset    Arthritis Mother     Stroke Mother     Arthritis Father     Cancer Father     Arthritis Daughter      Past Social history  Social History     Socioeconomic History    Marital status: /Civil Union     Spouse name: Not on file    Number of children: Not on file    Years of education: Not on file    Highest education level: Not on file   Occupational History    Not on file   Social Needs    Financial resource strain: Not on file    Food insecurity:     Worry: Not on file     Inability: Not on file    Transportation needs:     Medical: Not on file     Non-medical: Not on file   Tobacco Use    Smoking status: Never Smoker    Smokeless tobacco: Never Used   Substance and Sexual Activity    Alcohol use: No    Drug use: No    Sexual activity: Not on file   Lifestyle    Physical activity:     Days per week: Not on file     Minutes per session: Not on file    Stress: Not on file   Relationships    Social connections:     Talks on phone: Not on file     Gets together: Not on file     Attends Zoroastrianism service: Not on file     Active member of club or organization: Not on file     Attends meetings of clubs or organizations: Not on file     Relationship status: Not on file    Intimate partner violence:     Fear of current or ex partner: Not on file     Emotionally abused: Not on file     Physically abused: Not on file     Forced sexual activity: Not on file   Other Topics Concern    Not on file   Social History Narrative    Not on file       Current Medications  Current Outpatient Medications   Medication Sig Dispense Refill    acetaminophen (TYLENOL) 325 mg tablet Take 2 tablets by mouth every 6 (six) hours as needed for mild pain or fever 30 tablet 0    amoxicillin (AMOXIL) 500 mg capsule TK 4 CS BEFORE 2 HOURS BEFORE DENTAL OR FACIAL SURGERY WORK  3    atorvastatin (LIPITOR) 40 mg tablet Take 40 mg by mouth daily after dinner Atorvastatin Calcium 40 MG Oral Tablet Take 1 tablet daily  Refills: 0  Active       B Complex-C (SUPER B COMPLEX PO) Take by mouth      candesartan (ATACAND) 16 mg tablet 16 mg  3    cholecalciferol (VITAMIN D3) 1,000 units tablet Take 2,000 Units by mouth daily      Iron Combinations (NIFEREX) TABS Take one tablet daily  5    meloxicam (MOBIC) 15 mg tablet Take by mouth      metFORMIN (GLUCOPHAGE) 1000 MG tablet Take 1,000 mg by mouth daily after dinner MetFORMIN HCl 1000 MG (MOD) TB24 Take one tablet daily  Refills: 0  Active       Multiple Vitamin (MULTIVITAMINS PO) Take 1 tablet by mouth daily      omeprazole (PriLOSEC) 20 mg delayed release capsule Omeprazole 20 MG Oral Tablet Delayed Release Take 1 tablet daily  Refills: 0  Active      potassium chloride (K-DUR,KLOR-CON) 20 mEq tablet Take 20 mEq by mouth daily after dinner Potassium Chloride Jesusita ER 20 MEQ Oral Tablet Extended Release Take 1 tablet daily  Refills: 0  Active       sotalol (BETAPACE) 80 mg tablet TK 1 T PO Q DAY AT 9 AM AND 9 PM  2    torsemide (DEMADEX) 20 mg tablet TAKE 1 TABLET(20 MG) BY MOUTH TWICE DAILY (Patient taking differently: TAKE 1 TABLET(20 MG) BY MOUTH  once daily) 180 tablet 5    warfarin (COUMADIN) 4 mg tablet Take 4 mg by mouth 2 (two) times a week Patient take 4mg everyday but Tuesday and Friday   warfarin (COUMADIN) 6 mg tablet Take 6 mg by mouth 2 (two) times a week The patient takes on Tuesday and Fridays      cephalexin (KEFLEX) 500 mg capsule Take 1 capsule (500 mg total) by mouth every 6 (six) hours for 7 days 28 capsule 0     No current facility-administered medications for this visit  Allergies  No Known Allergies      The following portions of the patient's history were reviewed and updated as appropriate: allergies, current medications, past medical history, past social history, past surgical history and problem list       Vitals  Vitals:    07/09/19 1032   BP: 126/78   BP Location: Left arm   Patient Position: Sitting   Cuff Size: Large   Pulse: 62   Weight: (!) 155 kg (341 lb 12 8 oz)   Height: 6' 1" (1 854 m)     Physical Exam  Physical Exam   Constitutional: He is oriented to person, place, and time  He appears well-developed and well-nourished  HENT:   Head: Atraumatic  Eyes: EOM are normal    Neck: Normal range of motion  Neck supple  Cardiovascular: Normal rate and normal heart sounds  No murmur heard  Pulmonary/Chest: Effort normal and breath sounds normal  No respiratory distress  Abdominal: Soft  Bowel sounds are normal    Obese   Musculoskeletal: Normal range of motion  Neurological: He is alert and oriented to person, place, and time  Skin: Skin is warm and dry  Psychiatric: He has a normal mood and affect  Vitals reviewed      Results  Recent Results (from the past 1 hour(s))   POCT urine dip    Collection Time: 07/09/19 10:32 AM   Result Value Ref Range    LEUKOCYTE ESTERASE,UA ++     NITRITE,UA -     SL AMB POCT UROBILINOGEN 0 2     POCT URINE PROTEIN -      PH,UA 5 0     BLOOD,UA trace     SPECIFIC GRAVITY,UA 1 010     KETONES,UA -     BILIRUBIN,UA -     GLUCOSE, UA -      COLOR,UA yelow     CLARITY,UA clear      No results found for: PSA  Lab Results   Component Value Date    GLUCOSE 121 10/10/2015    CALCIUM 8 7 08/11/2018     10/10/2015    K 4 0 08/11/2018    CO2 27 08/11/2018     08/11/2018    BUN 34 (H) 08/11/2018    CREATININE 1 56 (H) 08/11/2018     Lab Results   Component Value Date    WBC 4 51 08/11/2018    HGB 11 1 (L) 08/11/2018    HCT 34 8 (L) 08/11/2018    MCV 93 08/11/2018     08/11/2018       Orders  Orders Placed This Encounter   Procedures    Urine culture    Urinalysis with microscopic    POCT urine dip     ODESSA Carnes

## 2019-07-09 NOTE — PATIENT INSTRUCTIONS
Maintain adequate hydration  Keflex for 7 days  Call for concerning symptoms such as uncontrolled pain, fever, chills, worsening bleeding with urination

## 2019-07-09 NOTE — LETTER
July 10, 2019     Manasa Cox  [phone] [address]    Patient: Valarie Barker   YOB: 1935   Date of Visit: 7/9/2019       Dear Dr Barrington White: Thank you for referring Robsongrayson Hufflola to me for evaluation  Below are my notes for this consultation  If you have questions, please do not hesitate to call me  I look forward to following your patient along with you  Sincerely,        ODESSA Foster        CC: No Recipients  Cristobal Angel, Tobin Casia   7/10/2019  8:20 AM  Sign at close encounter  7/9/2019    Chief Complaint   Patient presents with   Betsy Hancock Hematuria     Assessment and Plan    81 y o  male managed by Dr Healy Cassette    1  Benign prostatic hyperplasia  · Continue to monitor urinary symptoms, urinary tract infections    2  Hematuria  · Urine in office clear yellow  · Urine dip positive for trace blood, positive for leukocytes  · Cephalexin 500 mg p o  Q 6 hours x7 days prescription provided  · Instructed patient to call for concerning symptoms such as uncontrolled pain, fever, chills, worsening bleeding with urination, passage of clots, inability urinate  · Return for follow-up in 2 weeks      History of Present Illness  Valarie Barker is a 81 y o  male here for  evaluation of  intermittent gross hematuria  Patient currently presents to the office with clear yellow urine and no complaints of hematuria  Patient reports couple of days ago noticing some light pink drainage on the tissue after cleaning himself after urinating and then more recently obvious gross blood in the toilet after urinating  He does complain of mild dysuria occasionally  He denies urinary frequency and urgency  He denies fever and chills  Patient has no current complaints of suprapubic abdominal pain or bilateral flank pain  Patient with significant past medical history diabetes, benign prostatic hyperplasia, hypertension, ICD insertion, atrial fibrillation treated with Coumadin  Most recent INR evaluated on 06/17/2019 which was 2 6  Review of Systems   Constitutional: Negative for chills and fever  Respiratory: Negative for cough and shortness of breath  Cardiovascular: Negative for chest pain  Gastrointestinal: Negative for abdominal distention, abdominal pain, blood in stool, nausea and vomiting  Genitourinary: Positive for dysuria and hematuria  Negative for difficulty urinating, enuresis, flank pain, frequency, penile pain, penile swelling, scrotal swelling, testicular pain and urgency  Skin: Negative for rash  Urinary Incontinence Screening      Most Recent Value   Urinary Incontinence   Urinary Incontinence? No   Incomplete emptying? No   Urinary frequency? Yes   Urinary urgency? No   Dysuria (painful difficult urination)? No   Nocturia (waking up to use the bathroom)? Yes [3x]   Straining (having to push to go)? No   Weak stream?  No   Intermittent stream?  No          AUA SYMPTOM SCORE      Most Recent Value   AUA SYMPTOM SCORE   How often have you had a sensation of not emptying your bladder completely after you finished urinating? 0   How often have you had to urinate again less than two hours after you finished urinating? 4   How often have you found you stopped and started again several times when you urinate?  0   How often have you found it difficult to postpone urination? 0   How often have you had a weak urinary stream?  0   How often have you had to push or strain to begin urination? 1   How many times did you most typically get up to urinate from the time you went to bed at night until the time you got up in the morning?   3   Quality of Life: If you were to spend the rest of your life with your urinary condition just the way it is now, how would you feel about that?  2   AUA SYMPTOM SCORE  8           Past Medical History  Past Medical History:   Diagnosis Date    Anemia     Arthritis     [facility]     [facility]congest[facility] heart failure) ([facility] )     Diabetes mellitus ([place] [address] )     Niddm    DVT (deep vein thrombosis) in pregnancy ([place] [address] ) 1966    not in pregnancy    DVT (deep venous thrombosis) ([place] [address] ) 1966    Dyslipidemia     GERD (gastroesophageal reflux disease)     Hyperlipidemia     Hypertension     Irregular heart beat     Afib    Morbid obesity due to excess calories ([facility] )     Resolved 9/2/2014     Pulmonary embolism (HCC)     Sepsis ([place] [address] )     Visual impairment        Past Social History  Past Surgical History:   Procedure Laterality Date    AORTIC VALVE REPLACEMENT      CARDIAC DEFIBRILLATOR PLACEMENT  04/2014    CARDIAC SURGERY  02/2014    AVR    COLONOSCOPY      INSERT / REPLACE / REMOVE PACEMAKER      JOINT REPLACEMENT Left     LTKR    DE LIGATE/STRIP LONG SAPH VEIN BELW SEP-FEM JUNC Right 8/17/2018    Procedure: LEG PERFORATED INJECTION SCLEROTHERAPY;  Surgeon: Marvin Cardenas MD;  [facility];  Service: Vascular    TOTAL KNEE ARTHROPLASTY Left     VASCULAR SURGERY      VENA CAVA FILTER PLACEMENT      Interruption inferior vena cava, [place] filter, placement    WISDOM TOOTH EXTRACTION       Social History     Tobacco Use   Smoking Status Never Smoker   Smokeless Tobacco Never Used       Past Family History  Family History   Problem Relation Age of Onset    Arthritis Mother     Stroke Mother     Arthritis Father     Cancer Father     Arthritis Daughter      Past Social history  Social History     Socioeconomic History    Marital status: /Civil Union     Spouse name: Not on file    Number of children: Not on file    Years of education: Not on file    Highest education level: Not on file   Occupational History    Not on file   Social Needs    Financial resource strain: Not on file    Food insecurity:     Worry: Not on file     Inability: Not on file    Transportation needs:     Medical: Not on file     Non-medical: Not on file   Tobacco Use    Smoking status: Never Smoker    Smokeless tobacco: Never Used   Substance and Sexual Activity    Alcohol use: No    Drug use: No    Sexual activity: Not on file   Lifestyle    Physical activity:     Days per week: Not on file     Minutes per session: Not on file    Stress: Not on file   Relationships    Social connections:     Talks on phone: Not on file     Gets together: Not on file     Attends [employer] service: Not on file     Active member of club or organization: Not on file     Attends meetings of clubs or organizations: Not on file     Relationship status: Not on file    Intimate partner violence:     Fear of current or ex partner: Not on file     Emotionally abused: Not on file     Physically abused: Not on file     Forced sexual activity: Not on file   Other Topics Concern    Not on file   Social History Narrative    Not on file       Current Medications  Current Outpatient Medications   Medication Sig Dispense Refill    acetaminophen (TYLENOL) 325 mg tablet Take 2 tablets by mouth every 6 (six) hours as needed for mild pain or fever 30 tablet 0    amoxicillin (AMOXIL) 500 mg capsule TK 4 CS BEFORE 2 HOURS BEFORE DENTAL OR FACIAL SURGERY WORK  3    atorvastatin (LIPITOR) 40 mg tablet Take 40 mg by mouth daily after dinner Atorvastatin Calcium 40 MG Oral Tablet Take 1 tablet daily  Refills: 0  Active       B Complex-C (SUPER B COMPLEX PO) Take by mouth      candesartan (ATACAND) 16 mg tablet 16 mg  3    cholecalciferol (VITAMIN D3) 1,000 units tablet Take 2,000 Units by mouth daily      Iron Combinations (NIFEREX) TABS Take one tablet daily  5    meloxicam (MOBIC) 15 mg tablet Take by mouth      metFORMIN (GLUCOPHAGE) 1000 MG tablet Take 1,000 mg by mouth daily after dinner MetFORMIN HCl 1000 MG (MOD) TB24 Take one tablet daily  Refills: 0  Active       Multiple Vitamin (MULTIVITAMINS PO) Take 1 tablet by mouth daily      omeprazole (PriLOSEC) 20 mg delayed release capsule Omeprazole 20 MG Oral Tablet Delayed Release Take 1 tablet daily  Refills: 0  Active      potassium chloride (K-DUR,KLOR-CON) 20 mEq tablet Take 20 mEq by mouth daily after dinner Potassium Chloride Jesusita ER 20 MEQ Oral Tablet Extended Release Take 1 tablet daily  Refills: 0  Active       sotalol (BETAPACE) 80 mg tablet TK 1 T PO Q DAY AT 9 AM AND 9 PM  2    torsemide (DEMADEX) 20 mg tablet TAKE 1 TABLET(20 MG) BY MOUTH TWICE DAILY (Patient taking differently: TAKE 1 TABLET(20 MG) BY MOUTH  once daily) 180 tablet 5    warfarin (COUMADIN) 4 mg tablet Take 4 mg by mouth 2 (two) times a week Patient take 4mg everyday but Tuesday and Friday   warfarin (COUMADIN) 6 mg tablet Take 6 mg by mouth 2 (two) times a week The patient takes on Tuesday and Fridays      cephalexin (KEFLEX) 500 mg capsule Take 1 capsule (500 mg total) by mouth every 6 (six) hours for 7 days 28 capsule 0     No current facility-administered medications for this visit  Allergies  No Known Allergies      The following portions of the patient's history were reviewed and updated as appropriate: allergies, current medications, past medical history, past social history, past surgical history and problem list       Vitals  Vitals:    07/09/19 1032   BP: 126/78   BP Location: Left arm   Patient Position: Sitting   Cuff Size: Large   Pulse: 62   Weight: (!) 155 kg (341 lb 12 8 oz)   Height: 6' 1" (1 854 m)     Physical Exam  Physical Exam   Constitutional: He is oriented to person, place, and time  He appears well-developed and well-nourished  HENT:   Head: Atraumatic  Eyes: EOM are normal    Neck: Normal range of motion  Neck supple  Cardiovascular: Normal rate and normal heart sounds  No murmur heard  Pulmonary/Chest: Effort normal and breath sounds normal  No respiratory distress  Abdominal: Soft  Bowel sounds are normal    Obese   Musculoskeletal: Normal range of motion  Neurological: He is alert and oriented to person, place, and time  Skin: Skin is warm and dry  Psychiatric: He has a normal mood and affect  Vitals reviewed      Results  Recent Results (from the past 1 hour(s))   POCT urine dip    Collection Time: 07/09/19 10:32 AM   Result Value Ref Range    LEUKOCYTE ESTERASE,UA ++     NITRITE,UA -     SL AMB POCT UROBILINOGEN 0 2     POCT URINE PROTEIN -      PH,UA 5 0     BLOOD,UA trace     SPECIFIC GRAVITY,UA 1 010     KETONES,UA -     BILIRUBIN,UA -     GLUCOSE, UA -      COLOR,UA yelow     CLARITY,UA clear      No results found for: PSA  Lab Results   Component Value Date    GLUCOSE 121 10/10/2015    CALCIUM 8 7 08/11/2018     10/10/2015    K 4 0 08/11/2018    CO2 27 08/11/2018     08/11/2018    BUN 34 (H) 08/11/2018    CREATININE 1 56 (H) 08/11/2018     Lab Results   Component Value Date    WBC 4 51 08/11/2018    HGB 11 1 (L) 08/11/2018    HCT 34 8 (L) 08/11/2018    MCV 93 08/11/2018     08/11/2018       Orders  Orders Placed This Encounter   Procedures    Urine culture    Urinalysis with microscopic    POCT urine dip     ODESSA Villar

## 2019-07-10 ENCOUNTER — IN-CLINIC DEVICE VISIT (OUTPATIENT)
Dept: CARDIOLOGY CLINIC | Facility: CLINIC | Age: 84
End: 2019-07-10
Payer: MEDICARE

## 2019-07-10 DIAGNOSIS — Z95.810 AICD (AUTOMATIC CARDIOVERTER/DEFIBRILLATOR) PRESENT: Primary | ICD-10-CM

## 2019-07-10 LAB — BACTERIA UR CULT: NORMAL

## 2019-07-10 PROCEDURE — 93283 PRGRMG EVAL IMPLANTABLE DFB: CPT | Performed by: INTERNAL MEDICINE

## 2019-07-10 NOTE — PROGRESS NOTES
Results for orders placed or performed in visit on 07/10/19   Cardiac EP device report    Narrative    MDT DUAL CHAMBER ICD  DEVICE INTERROGATED IN THE [place] OFFICE: BATTERY VOLTAGE ADEQUATE  AP 1 7%  0%  ALL AVAILABLE LEAD PARAMETERS WITHIN NORMAL LIMITS  NO SIGNIFICANT HIGH RATE EPISODES  NO PROGRAMMING CHANGES MADE TO DEVICE PARAMETERS  NORMAL DEVICE FUNCTION   NC

## 2019-07-11 ENCOUNTER — TELEPHONE (OUTPATIENT)
Dept: UROLOGY | Facility: CLINIC | Age: 84
End: 2019-07-11

## 2019-07-11 ENCOUNTER — OFFICE VISIT (OUTPATIENT)
Dept: PHYSICAL THERAPY | Facility: REHABILITATION | Age: 84
End: 2019-07-11
Payer: MEDICARE

## 2019-07-11 ENCOUNTER — ANTICOAG VISIT (OUTPATIENT)
Dept: INTERNAL MEDICINE CLINIC | Facility: CLINIC | Age: 84
End: 2019-07-11

## 2019-07-11 DIAGNOSIS — M17.11 OSTEOARTHRITIS OF RIGHT KNEE, UNSPECIFIED OSTEOARTHRITIS TYPE: Primary | ICD-10-CM

## 2019-07-11 DIAGNOSIS — Z74.09 DECREASED FUNCTIONAL MOBILITY AND ENDURANCE: ICD-10-CM

## 2019-07-11 DIAGNOSIS — R26.2 AMBULATORY DYSFUNCTION: ICD-10-CM

## 2019-07-11 PROCEDURE — 97140 MANUAL THERAPY 1/> REGIONS: CPT

## 2019-07-11 PROCEDURE — 97112 NEUROMUSCULAR REEDUCATION: CPT

## 2019-07-11 NOTE — TELEPHONE ENCOUNTER
Pt  Called in 11:34am today advise saw Haily Olvera earlier this week and had some questions about medications prescribed

## 2019-07-11 NOTE — PROGRESS NOTES
Daily Note     Today's date: 2019  Patient name: Serafin Coronado  : 1935  MRN: [id number]  Referring provider: Sunita Morelos MD  Dx:   Encounter Diagnosis     ICD-10-CM    1  Osteoarthritis of right knee, unspecified osteoarthritis type M17 11    2  Ambulatory dysfunction R26 2    3  Decreased functional mobility and endurance Z74 09                   Subjective: Pt reported slight increased soreness along medial knee today possibly due to weather  Objective: See treatment diary below  [facility]                         Posterior right tibiofemoral JM's Gr 3  TP  TP                       Patellar ROM PRN                                                                                                                     Exercise Diary                          Nustep L7 x10'  L7 x10'                       Heel slides 10"x10  10"x10                       Bridges  3"x15  3"x15                       SLR flex  20  20                       Clamshells  15  15                       LAQ  3"Z46  8"D64                       HS curls  20  20                       Step ups  4"x15  4"x15                       Mini squats pain-free  20  20                       Sit to stand  10 biodex foam  10 biodex foam                        Side stepping  x3  x3                       Biodex:  LOS                           Biodex:  RC                           Standing hip ABD  20  20                                                                                                                                                                                                     Modalities   5/20                     CP PRN  def                                                                           Assessment: Tolerated treatment well  Patient demonstrated fatigue post treatment and exhibited good technique with therapeutic exercises  VC's needed for correct technique throughout session   Some soreness noted along medial knee with exercises  Plan: Continue per plan of care

## 2019-07-12 NOTE — TELEPHONE ENCOUNTER
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

## 2019-07-14 LAB
INR PPP: 1.8
PROTHROMBIN TIME: 18.6 SEC (ref 9–11.5)

## 2019-07-15 ENCOUNTER — ANTICOAG VISIT (OUTPATIENT)
Dept: INTERNAL MEDICINE CLINIC | Facility: CLINIC | Age: 84
End: 2019-07-15

## 2019-07-18 ENCOUNTER — OFFICE VISIT (OUTPATIENT)
Dept: PHYSICAL THERAPY | Facility: REHABILITATION | Age: 84
End: 2019-07-18
Payer: MEDICARE

## 2019-07-18 DIAGNOSIS — M17.11 OSTEOARTHRITIS OF RIGHT KNEE, UNSPECIFIED OSTEOARTHRITIS TYPE: Primary | ICD-10-CM

## 2019-07-18 PROCEDURE — 97140 MANUAL THERAPY 1/> REGIONS: CPT

## 2019-07-18 PROCEDURE — 97112 NEUROMUSCULAR REEDUCATION: CPT

## 2019-07-18 NOTE — PROGRESS NOTES
Daily Note     Today's date: 2019  Patient name: Juan Antonio Brown  : 1935  MRN: [id number]  Referring provider: Norma Reynolds MD  Dx:   Encounter Diagnosis     ICD-10-CM    1  Osteoarthritis of right knee, unspecified osteoarthritis type M17 11         1:1 with PTA CR 5:15- 5:55  Subjective: " I feel I'm making progress but it's slow  " Continues with medial knee pain  Objective: See treatment diary below  [facility]                       Posterior right tibiofemoral JM's Gr 3  TP  TP  PT,TP  5 mins                       Patellar ROM PRN      NP                                                       Exercise Diary                      Nustep L7 x10'  L7 x10'  L6 x 10 mins                   Heel slides 10"x10  10"x10  10"x10                   Bridges  3"x15  3"x15  3"x20                   SLR flex  20  20  2x10                   Clamshells  15  15  2x10                   LAQ  3"x25  3"x25  3"x30                   HS curls  20  20  20                   Step ups  4"x15  4"x15  4"x20                   Mini squats pain-free  20  20  2x10                   Sit to stand  10 biodex foam  10 biodex foam   10   Biodex  foam                   Side stepping  x3  x3  3 laps                   Biodex:  LOS                         Biodex:  RC                         Standing hip ABD  20  20  20                                                                                                                                                                                     Modalities                    CP PRN  def  declined                                                                   Assessment: Tolerated treatment well  Patient demonstrated fatigue post treatment and exhibited good technique with therapeutic exercises  Able to progress repetitions as charted  Attempted 6" step up however unable due to pain  Plan: Continue per plan of care

## 2019-07-19 DIAGNOSIS — R06.02 SOB (SHORTNESS OF BREATH): ICD-10-CM

## 2019-07-19 RX ORDER — TORSEMIDE 20 MG/1
TABLET ORAL
Qty: 180 TABLET | Refills: 0 | Status: ON HOLD | OUTPATIENT
Start: 2019-07-19 | End: 2022-05-09 | Stop reason: SDUPTHER

## 2019-07-22 ENCOUNTER — OFFICE VISIT (OUTPATIENT)
Dept: PHYSICAL THERAPY | Facility: REHABILITATION | Age: 84
End: 2019-07-22
Payer: MEDICARE

## 2019-07-22 DIAGNOSIS — Z74.09 DECREASED FUNCTIONAL MOBILITY AND ENDURANCE: ICD-10-CM

## 2019-07-22 DIAGNOSIS — R26.2 AMBULATORY DYSFUNCTION: ICD-10-CM

## 2019-07-22 DIAGNOSIS — I10 ESSENTIAL HYPERTENSION: Primary | ICD-10-CM

## 2019-07-22 DIAGNOSIS — M17.11 OSTEOARTHRITIS OF RIGHT KNEE, UNSPECIFIED OSTEOARTHRITIS TYPE: Primary | ICD-10-CM

## 2019-07-22 PROCEDURE — 97110 THERAPEUTIC EXERCISES: CPT

## 2019-07-22 PROCEDURE — 97112 NEUROMUSCULAR REEDUCATION: CPT

## 2019-07-22 RX ORDER — CANDESARTAN 16 MG/1
16 TABLET ORAL DAILY
Qty: 90 TABLET | Refills: 3 | Status: SHIPPED | OUTPATIENT
Start: 2019-07-22 | End: 2022-05-09 | Stop reason: HOSPADM

## 2019-07-22 NOTE — PROGRESS NOTES
Daily Note     Today's date: 2019  Patient name: Phillip Negrete  : 1935  MRN: [id number]  Referring provider: January Casey MD  Dx:   Encounter Diagnosis     ICD-10-CM    1  Osteoarthritis of right knee, unspecified osteoarthritis type M17 11    2  Ambulatory dysfunction R26 2    3  Decreased functional mobility and endurance Z74 09                   Subjective: Pt reported no changes in symptoms since LV  Objective: See treatment diary below  [facility]                   Posterior right tibiofemoral JM's Gr 3  TP  TP  PT,TP  5 mins     TP                 Patellar ROM PRN      NP                                                   Exercise Diary                    Nustep L7 x10'  L7 x10'  L6 x 10 mins  L6 x10'                 Heel slides 10"x10  10"x10  10"x10  10"x10                 Bridges  3"x15  3"x15  3"x20  3"x20                 SLR flex  20  20  2x10  20                 Clamshells  15  15  2x10  20                 LAQ  3"x25  3"x25  3"x30  3"x30                 HS curls  20  20  20  20                 Step ups  4"x15  4"x15  4"x20  4"x20                 Mini squats pain-free  20  20  2x10  20                 Sit to stand  10 biodex foam  10 biodex foam   10   Biodex  foam  10 biodex foam                 Side stepping  x3  x3  3 laps  x3                 Biodex:  LOS                         Biodex:  RC                         Standing hip ABD  20  20  20  20                                                                                                                                                                                   Modalities                    CP PRN  def  declined                                                                   Assessment: Tolerated treatment well  Patient demonstrated fatigue post treatment and exhibited good technique with therapeutic exercises  VC's needed for correct technique throughout session   Rest breaks needed to properly perform exercises  Plan: Continue per plan of care   1 on 1 with PTA and PT

## 2019-07-25 ENCOUNTER — OFFICE VISIT (OUTPATIENT)
Dept: PHYSICAL THERAPY | Facility: REHABILITATION | Age: 84
End: 2019-07-25
Payer: MEDICARE

## 2019-07-25 DIAGNOSIS — M17.11 OSTEOARTHRITIS OF RIGHT KNEE, UNSPECIFIED OSTEOARTHRITIS TYPE: Primary | ICD-10-CM

## 2019-07-25 DIAGNOSIS — Z74.09 DECREASED FUNCTIONAL MOBILITY AND ENDURANCE: ICD-10-CM

## 2019-07-25 DIAGNOSIS — R26.2 AMBULATORY DYSFUNCTION: ICD-10-CM

## 2019-07-25 PROCEDURE — 97110 THERAPEUTIC EXERCISES: CPT

## 2019-07-25 PROCEDURE — 97112 NEUROMUSCULAR REEDUCATION: CPT

## 2019-07-25 NOTE — PROGRESS NOTES
Daily Note     Today's date: 2019  Patient name: Greg Park  : 1935  MRN: [id number]  Referring provider: Jose Carlos Cisneros MD  Dx:   Encounter Diagnosis     ICD-10-CM    1  Osteoarthritis of right knee, unspecified osteoarthritis type M17 11    2  Ambulatory dysfunction R26 2    3  Decreased functional mobility and endurance Z74 09         1:1 with PTA CR 4:45-5:30  Subjective: Tired after working today but no significant changes in pain  Objective: See treatment diary below  [facility]                 Posterior right tibiofemoral JM's Gr 3  TP  TP  PT,TP  5 mins     TP  KEN,PT  5 mins               Patellar ROM PRN      NP    NP                                               Exercise Diary                Nustep L7 x10'  L7 x10'  L6 x 10 mins  L6 x10'  L6  10 mins             Heel slides 10"x10  10"x10  10"x10  10"x10  10"x10             Bridges  3"x15  3"x15  3"x20  3"x20  3"x20             SLR flex  20  20  2x10  20  2x10             Clamshells  15  15  2x10  20  20              LAQ  3"x25  3"x25  3"x30  3"x30  3"x30             HS curls  20  20  20  20  20 ea              Step ups  4"x15  4"x15  4"x20  4"x20  4"x20             Mini squats pain-free  20  20  2x10  20  2x10             Sit to stand  10 biodex foam  10 biodex foam   10   Biodex  foam  10 biodex foam  15  biodex  foam             Side stepping  x3  x3  3 laps  x3  x3             Standing hip ABD  20  20  20  20  20 ea  B/L                                                                                                                   Modalities                    CP PRN  def  declined                                             Assessment: Tolerated treatment well  Patient demonstrated fatigue post treatment and exhibited good technique with therapeutic exercises  Several standing exercises performed bilaterally today  Scheduled for RE [facility]         Plan: Continue per plan of care

## 2019-08-01 ENCOUNTER — EVALUATION (OUTPATIENT)
Dept: PHYSICAL THERAPY | Facility: REHABILITATION | Age: 84
End: 2019-08-01
Payer: MEDICARE

## 2019-08-01 ENCOUNTER — OFFICE VISIT (OUTPATIENT)
Dept: UROLOGY | Facility: AMBULATORY SURGERY CENTER | Age: 84
End: 2019-08-01
Payer: MEDICARE

## 2019-08-01 VITALS
BODY MASS INDEX: 41.75 KG/M2 | HEART RATE: 68 BPM | DIASTOLIC BLOOD PRESSURE: 78 MMHG | SYSTOLIC BLOOD PRESSURE: 138 MMHG | WEIGHT: 315 LBS | HEIGHT: 73 IN

## 2019-08-01 DIAGNOSIS — Z79.01 ANTICOAGULATED: Primary | ICD-10-CM

## 2019-08-01 DIAGNOSIS — Z74.09 DECREASED FUNCTIONAL MOBILITY AND ENDURANCE: ICD-10-CM

## 2019-08-01 DIAGNOSIS — R26.2 AMBULATORY DYSFUNCTION: ICD-10-CM

## 2019-08-01 DIAGNOSIS — M17.11 OSTEOARTHRITIS OF RIGHT KNEE, UNSPECIFIED OSTEOARTHRITIS TYPE: Primary | ICD-10-CM

## 2019-08-01 DIAGNOSIS — R31.0 GROSS HEMATURIA: Primary | ICD-10-CM

## 2019-08-01 DIAGNOSIS — N39.0 URINARY TRACT INFECTION WITH HEMATURIA, SITE UNSPECIFIED: ICD-10-CM

## 2019-08-01 DIAGNOSIS — R31.9 URINARY TRACT INFECTION WITH HEMATURIA, SITE UNSPECIFIED: ICD-10-CM

## 2019-08-01 PROCEDURE — 97110 THERAPEUTIC EXERCISES: CPT

## 2019-08-01 PROCEDURE — 97112 NEUROMUSCULAR REEDUCATION: CPT

## 2019-08-01 PROCEDURE — 99213 OFFICE O/P EST LOW 20 MIN: CPT | Performed by: NURSE PRACTITIONER

## 2019-08-01 RX ORDER — WARFARIN SODIUM 4 MG/1
4 TABLET ORAL 2 TIMES WEEKLY
Qty: 90 TABLET | Refills: 0 | Status: SHIPPED | OUTPATIENT
Start: 2019-08-01

## 2019-08-01 NOTE — LETTER
August 4, 2019     Jeanie Briscoe Rosyhunter  [address] [address]    Patient: Marifer Smith   YOB: 1935   Date of Visit: 8/1/2019       Dear Dr Alcides Canales: Thank you for referring Latanya Barker to me for evaluation  Below are my notes for this consultation  If you have questions, please do not hesitate to call me  I look forward to following your patient along with you  Sincerely,        ODESSA Fitzgerald        CC: No Recipients  ODESSA Fitzgerald  8/4/2019 10:26 PM  Incomplete  8/1/2019      Chief Complaint   Patient presents with   Yuri Giordano Hematuria     2 week f/u     Assessment and Plan    81 y o  male managed by Dr Carter Quinonez    1  Benign prostatic hyperplasia  · Bladder scan PVR not performed due to patient inability to void    2  Hematuria  · Resolved  · Urine for cytology negative for malignancy performed 07/09/2019      History of Present Illness  Marifer Smith is a 81 y o  male here for follow up evaluation of  urinary symptoms related to benign prostatic hyperplasia and gross hematuria  On last office visit dated 07/09/2019 patient complained of intermittent gross hematuria and urine dip is positive for trace blood and trace leukocytes  At that time, cephalexin 500 mg p o  Q 6 hours x7 days was provided  Pt reports full resolution of urinary symptoms and hematuria  He currently has no complaints today on examination  Past medical history includes diabetes, benign prostatic hyperplasia, hypertension, ICD insertion, atrial fibrillation treated with Coumadin  Review of Systems   Constitutional: Negative for chills and fever  Respiratory: Negative for cough and shortness of breath  Cardiovascular: Negative for chest pain  Gastrointestinal: Negative for abdominal distention, abdominal pain, blood in stool, nausea and vomiting  Genitourinary: Negative for difficulty urinating, dysuria, enuresis, flank pain, frequency, hematuria and urgency  Skin: Negative for rash       Past Medical History  Past Medical History:   Diagnosis Date    Anemia     Arthritis     Atrial fibrillation (HCC)     CHF (congestive heart failure) (HCC)     Diabetes mellitus (HCC)     Niddm    DVT (deep vein thrombosis) in pregnancy ([place] [address] ) 1966    not in pregnancy    DVT (deep venous thrombosis) ([place] [address] ) 1966    Dyslipidemia     GERD (gastroesophageal reflux disease)     Hyperlipidemia     Hypertension     Irregular heart beat     Afib    Morbid obesity due to excess calories ([place] [address] )     Resolved 9/2/2014     Pulmonary embolism (HCC)     Sepsis ([place] [address] )     Visual impairment        Past Social History  Past Surgical History:   Procedure Laterality Date    AORTIC VALVE REPLACEMENT      CARDIAC DEFIBRILLATOR PLACEMENT  04/2014    CARDIAC SURGERY  02/2014    AVR    COLONOSCOPY      INSERT / REPLACE / REMOVE PACEMAKER      JOINT REPLACEMENT Left     LTKR    NE LIGATE/STRIP LONG SAPH VEIN BELW SEP-FEM JUNC Right 8/17/2018    Procedure: LEG PERFORATED INJECTION SCLEROTHERAPY;  Surgeon: Sy Ly MD;  [facility];  Service: Vascular    TOTAL KNEE ARTHROPLASTY Left     VASCULAR SURGERY      VENA CAVA FILTER PLACEMENT      Interruption inferior vena cava, [place] filter, placement    WISDOM TOOTH EXTRACTION       Social History     Tobacco Use   Smoking Status Never Smoker   Smokeless Tobacco Never Used       Past Family History  Family History   Problem Relation Age of Onset    Arthritis Mother     Stroke Mother     Arthritis Father     Cancer Father     Arthritis Daughter        Past Social history  Social History     Socioeconomic History    Marital status: /Civil Union     Spouse name: Not on file    Number of children: Not on file    Years of education: Not on file    Highest education level: Not on file   Occupational History    Not on file   Social Needs    Financial resource strain: Not on file    Food insecurity:     Worry: Not on file     Inability: Not on file    Transportation needs:     Medical: Not on file     Non-medical: Not on file   Tobacco Use    Smoking status: Never Smoker    Smokeless tobacco: Never Used   Substance and Sexual Activity    Alcohol use: No    Drug use: No    Sexual activity: Not on file   Lifestyle    Physical activity:     Days per week: Not on file     Minutes per session: Not on file    Stress: Not on file   Relationships    Social connections:     Talks on phone: Not on file     Gets together: Not on file     Attends Gnosticist service: Not on file     Active member of club or organization: Not on file     Attends meetings of clubs or organizations: Not on file     Relationship status: Not on file    Intimate partner violence:     Fear of current or ex partner: Not on file     Emotionally abused: Not on file     Physically abused: Not on file     Forced sexual activity: Not on file   Other Topics Concern    Not on file   Social History Narrative    Not on file       Current Medications  Current Outpatient Medications   Medication Sig Dispense Refill    acetaminophen (TYLENOL) 325 mg tablet Take 2 tablets by mouth every 6 (six) hours as needed for mild pain or fever 30 tablet 0    amoxicillin (AMOXIL) 500 mg capsule TK 4 CS BEFORE 2 HOURS BEFORE DENTAL OR FACIAL SURGERY WORK  3    atorvastatin (LIPITOR) 40 mg tablet Take 40 mg by mouth daily after dinner Atorvastatin Calcium 40 MG Oral Tablet Take 1 tablet daily  Refills: 0  Active       B Complex-C (SUPER B COMPLEX PO) Take by mouth      candesartan (ATACAND) 16 mg tablet Take 1 tablet (16 mg total) by mouth daily 90 tablet 3    cholecalciferol (VITAMIN D3) 1,000 units tablet Take 2,000 Units by mouth daily      Iron Combinations (NIFEREX) TABS Take one tablet daily  5    meloxicam (MOBIC) 15 mg tablet Take by mouth      metFORMIN (GLUCOPHAGE) 1000 MG tablet Take 1,000 mg by mouth daily after dinner MetFORMIN HCl 1000 MG (MOD) TB24 Take one tablet daily  Refills: 0  Active       Multiple Vitamin (MULTIVITAMINS PO) Take 1 tablet by mouth daily      omeprazole (PriLOSEC) 20 mg delayed release capsule Omeprazole 20 MG Oral Tablet Delayed Release Take 1 tablet daily  Refills: 0  Active      potassium chloride (K-DUR,KLOR-CON) 20 mEq tablet Take 20 mEq by mouth daily after dinner Potassium Chloride Jesusita ER 20 MEQ Oral Tablet Extended Release Take 1 tablet daily  Refills: 0  Active       sotalol (BETAPACE) 80 mg tablet TK 1 T PO Q DAY AT 9 AM AND 9 PM  2    torsemide (DEMADEX) 20 mg tablet TAKE 1 TABLET(20 MG) BY MOUTH TWICE DAILY 180 tablet 0    warfarin (COUMADIN) 4 mg tablet Take 4 mg by mouth 2 (two) times a week Patient take 4mg everyday but Tuesday and Friday   warfarin (COUMADIN) 6 mg tablet Take 6 mg by mouth 2 (two) times a week The patient takes on Tuesday and Fridays       No current facility-administered medications for this visit  Allergies  No Known Allergies    The following portions of the patient's history were reviewed and updated as appropriate: allergies, current medications, past medical history, past social history, past surgical history and problem list     Vitals  Vitals:    08/01/19 0833   BP: 138/78   BP Location: Left arm   Patient Position: Sitting   Cuff Size: Large   Pulse: 68   Weight: (!) 154 kg (338 lb 12 8 oz)   Height: 6' 1" (1 854 m)     Physical Exam  Physical Exam   Constitutional: He is oriented to person, place, and time  He appears well-developed and well-nourished  HENT:   Head: Atraumatic  Eyes: EOM are normal    Neck: Neck supple  Pulmonary/Chest: Effort normal  No respiratory distress  Abdominal: Soft  Bowel sounds are normal    Musculoskeletal: Normal range of motion  Neurological: He is alert and oriented to person, place, and time  Skin: Skin is warm and dry  Psychiatric: He has a normal mood and affect  Vitals reviewed      Results  No results found for this or any previous visit (from the past 1 hour(s))  ]  No results found for: PSA  Lab Results   Component Value Date    GLUCOSE 121 10/10/2015    CALCIUM 8 7 08/11/2018     10/10/2015    K 4 0 08/11/2018    CO2 27 08/11/2018     08/11/2018    BUN 34 (H) 08/11/2018    CREATININE 1 56 (H) 08/11/2018     Lab Results   Component Value Date    WBC 4 51 08/11/2018    HGB 11 1 (L) 08/11/2018    HCT 34 8 (L) 08/11/2018    MCV 93 08/11/2018     08/11/2018     Orders  Orders Placed This Encounter   Procedures    POCT Measure PVR     [facility], [address] [place] [address], [address]  8/1/2019  8:55 AM  Sign at close encounter  8/1/2019      Chief Complaint   Patient presents with   Dariuszne Senior Hematuria     2 week f/u     Assessment and Plan    81 y o  male managed by Dr Hope Poe    1  Benign prostatic hyperplasia  · Bladder scan PVR    2  Hematuria  ·       History of Present Illness  Nkechi Conteh is a 81 y o  male here for follow up evaluation of  urinary symptoms related to benign prostatic hyperplasia and gross hematuria  On last office visit dated 07/09/2019 patient complained of intermittent gross hematuria and urine dip is positive for trace blood and trace leukocytes  At that time, cephalexin 500 mg p o  Q 6 hours x7 days was provided  Pt reports full resolution of urinary symptoms and hematuria  He currently has no complaints today on examination  Past medical history includes diabetes, benign prostatic hyperplasia, hypertension, ICD insertion, atrial fibrillation treated with Coumadin  Review of Systems   Constitutional: Negative for chills and fever  Respiratory: Negative for cough and shortness of breath  Cardiovascular: Negative for chest pain  Gastrointestinal: Negative for abdominal distention, abdominal pain, blood in stool, nausea and vomiting  Genitourinary: Negative for difficulty urinating, dysuria, enuresis, flank pain, frequency, hematuria and urgency  Skin: Negative for rash  Past Medical History  Past Medical History:   Diagnosis Date    Anemia     Arthritis     Atrial fibrillation (HCC)     CHF (congestive heart failure) (HCC)     Diabetes mellitus (HCC)     Niddm    DVT (deep vein thrombosis) in pregnancy ([place] [address] ) 1966    not in pregnancy    DVT (deep venous thrombosis) ([place] [address] ) 1966    Dyslipidemia     GERD (gastroesophageal reflux disease)     Hyperlipidemia     Hypertension     Irregular heart beat     Afib    Morbid obesity due to excess calories ([place] [address] )     Resolved 9/2/2014     Pulmonary embolism (HCC)     Sepsis ([place] [address] )     Visual impairment        Past Social History  Past Surgical History:   Procedure Laterality Date    AORTIC VALVE REPLACEMENT      CARDIAC DEFIBRILLATOR PLACEMENT  04/2014    CARDIAC SURGERY  02/2014    AVR    COLONOSCOPY      INSERT / REPLACE / REMOVE PACEMAKER      JOINT REPLACEMENT Left     LTKR    OR LIGATE/STRIP LONG SAPH VEIN BELW SEP-FEM JUNC Right 8/17/2018    Procedure: LEG PERFORATED INJECTION SCLEROTHERAPY;  Surgeon: Elie Farley MD;  [facility];  Service: Vascular    TOTAL KNEE ARTHROPLASTY Left     VASCULAR SURGERY      VENA CAVA FILTER PLACEMENT      Interruption inferior vena cava, [place] filter, placement    WISDOM TOOTH EXTRACTION       Social History     Tobacco Use   Smoking Status Never Smoker   Smokeless Tobacco Never Used       Past Family History  Family History   Problem Relation Age of Onset    Arthritis Mother     Stroke Mother     Arthritis Father     Cancer Father     Arthritis Daughter        Past Social history  Social History     Socioeconomic History    Marital status: /Civil Union     Spouse name: Not on file    Number of children: Not on file    Years of education: Not on file    Highest education level: Not on file   Occupational History    Not on file   Social Needs    Financial resource strain: Not on file    Food insecurity:     Worry: Not on file     Inability: Not on file    Transportation needs:     Medical: Not on file     Non-medical: Not on file   Tobacco Use    Smoking status: Never Smoker    Smokeless tobacco: Never Used   Substance and Sexual Activity    Alcohol use: No    Drug use: No    Sexual activity: Not on file   Lifestyle    Physical activity:     Days per week: Not on file     Minutes per session: Not on file    Stress: Not on file   Relationships    Social connections:     Talks on phone: Not on file     Gets together: Not on file     Attends Islam service: Not on file     Active member of club or organization: Not on file     Attends meetings of clubs or organizations: Not on file     Relationship status: Not on file    Intimate partner violence:     Fear of current or ex partner: Not on file     Emotionally abused: Not on file     Physically abused: Not on file     Forced sexual activity: Not on file   Other Topics Concern    Not on file   Social History Narrative    Not on file       Current Medications  Current Outpatient Medications   Medication Sig Dispense Refill    acetaminophen (TYLENOL) 325 mg tablet Take 2 tablets by mouth every 6 (six) hours as needed for mild pain or fever 30 tablet 0    amoxicillin (AMOXIL) 500 mg capsule TK 4 CS BEFORE 2 HOURS BEFORE DENTAL OR FACIAL SURGERY WORK  3    atorvastatin (LIPITOR) 40 mg tablet Take 40 mg by mouth daily after dinner Atorvastatin Calcium 40 MG Oral Tablet Take 1 tablet daily  Refills: 0  Active       B Complex-C (SUPER B COMPLEX PO) Take by mouth      candesartan (ATACAND) 16 mg tablet Take 1 tablet (16 mg total) by mouth daily 90 tablet 3    cholecalciferol (VITAMIN D3) 1,000 units tablet Take 2,000 Units by mouth daily      Iron Combinations (NIFEREX) TABS Take one tablet daily  5    meloxicam (MOBIC) 15 mg tablet Take by mouth      metFORMIN (GLUCOPHAGE) 1000 MG tablet Take 1,000 mg by mouth daily after dinner MetFORMIN HCl 1000 MG (MOD) TB24 Take one tablet daily Refills: 0  Active       Multiple Vitamin (MULTIVITAMINS PO) Take 1 tablet by mouth daily      omeprazole (PriLOSEC) 20 mg delayed release capsule Omeprazole 20 MG Oral Tablet Delayed Release Take 1 tablet daily  Refills: 0  Active      potassium chloride (K-DUR,KLOR-CON) 20 mEq tablet Take 20 mEq by mouth daily after dinner Potassium Chloride Jesusita ER 20 MEQ Oral Tablet Extended Release Take 1 tablet daily  Refills: 0  Active       sotalol (BETAPACE) 80 mg tablet TK 1 T PO Q DAY AT 9 AM AND 9 PM  2    torsemide (DEMADEX) 20 mg tablet TAKE 1 TABLET(20 MG) BY MOUTH TWICE DAILY 180 tablet 0    warfarin (COUMADIN) 4 mg tablet Take 4 mg by mouth 2 (two) times a week Patient take 4mg everyday but Tuesday and Friday   warfarin (COUMADIN) 6 mg tablet Take 6 mg by mouth 2 (two) times a week The patient takes on Tuesday and Fridays       No current facility-administered medications for this visit  Allergies  No Known Allergies    The following portions of the patient's history were reviewed and updated as appropriate: allergies, current medications, past medical history, past social history, past surgical history and problem list     Vitals  Vitals:    08/01/19 0833   BP: 138/78   BP Location: Left arm   Patient Position: Sitting   Cuff Size: Large   Pulse: 68   Weight: (!) 154 kg (338 lb 12 8 oz)   Height: 6' 1" (1 854 m)     Physical Exam  Physical Exam   Constitutional: He is oriented to person, place, and time  He appears well-developed and well-nourished  HENT:   Head: Atraumatic  Eyes: EOM are normal    Neck: Neck supple  Pulmonary/Chest: Effort normal  No respiratory distress  Abdominal: Soft  Bowel sounds are normal    Musculoskeletal: Normal range of motion  Neurological: He is alert and oriented to person, place, and time  Skin: Skin is warm and dry  Psychiatric: He has a normal mood and affect  Vitals reviewed      Results  No results found for this or any previous visit (from the past 1 hour(s))  ]  No results found for: PSA  Lab Results   Component Value Date    GLUCOSE 121 10/10/2015    CALCIUM 8 7 08/11/2018     10/10/2015    K 4 0 08/11/2018    CO2 27 08/11/2018     08/11/2018    BUN 34 (H) 08/11/2018    CREATININE 1 56 (H) 08/11/2018     Lab Results   Component Value Date    WBC 4 51 08/11/2018    HGB 11 1 (L) 08/11/2018    HCT 34 8 (L) 08/11/2018    MCV 93 08/11/2018     08/11/2018     Orders  Orders Placed This Encounter   Procedures    POCT Measure PVR     ODESSA Burns

## 2019-08-01 NOTE — LETTER
August 4, 2019     Manasa Veras  [address] [id number]    Patient: Yfn Lara   YOB: 1935   Date of Visit: 8/1/2019       Dear Dr Jonathan Gallo: Thank you for referring Ayaan Leonel to me for evaluation  Below are my notes for this consultation  If you have questions, please do not hesitate to call me  I look forward to following your patient along with you  Sincerely,        Pierrette Mortimer, CRNP        CC: No Recipients  Pierrette Mortimer, CRNP  8/1/2019  8:55 AM  Incomplete  8/1/2019      Chief Complaint   Patient presents with   Stevphen Hart Hematuria     2 week f/u     Assessment and Plan    81 y o  male managed by Dr Kimber Grossman    1  Benign prostatic hyperplasia  · Bladder scan PVR    2  Hematuria  ·       History of Present Illness  Yfn Lara is a 81 y o  male here for follow up evaluation of  urinary symptoms related to benign prostatic hyperplasia and gross hematuria  On last office visit dated 07/09/2019 patient complained of intermittent gross hematuria and urine dip is positive for trace blood and trace leukocytes  At that time, cephalexin 500 mg p o  Q 6 hours x7 days was provided  Pt reports full resolution of urinary symptoms and hematuria  He currently has no complaints today on examination  Past medical history includes diabetes, benign prostatic hyperplasia, hypertension, ICD insertion, atrial fibrillation treated with Coumadin  Review of Systems   Constitutional: Negative for chills and fever  Respiratory: Negative for cough and shortness of breath  Cardiovascular: Negative for chest pain  Gastrointestinal: Negative for abdominal distention, abdominal pain, blood in stool, nausea and vomiting  Genitourinary: Negative for difficulty urinating, dysuria, enuresis, flank pain, frequency, hematuria and urgency  Skin: Negative for rash       Past Medical History  Past Medical History:   Diagnosis Date    Anemia     Arthritis     Atrial fibrillation (HCC)     CHF (congestive heart failure) (HCC)     Diabetes mellitus ([facility] )     Niddm    DVT (deep vein thrombosis) in pregnancy ([place] [address] ) 1966    not in pregnancy    DVT (deep venous thrombosis) ([place] [address] ) 1966    Dyslipidemia     GERD (gastroesophageal reflux disease)     Hyperlipidemia     Hypertension     Irregular heart beat     Afib    Morbid obesity due to excess calories ([place] [address] )     Resolved 9/2/2014     Pulmonary embolism (HCC)     Sepsis ([place] [address] )     Visual impairment        Past Social History  Past Surgical History:   Procedure Laterality Date    AORTIC VALVE REPLACEMENT      CARDIAC DEFIBRILLATOR PLACEMENT  04/2014    CARDIAC SURGERY  02/2014    AVR    COLONOSCOPY      INSERT / REPLACE / REMOVE PACEMAKER      JOINT REPLACEMENT Left     LTKR    WV LIGATE/STRIP LONG SAPH VEIN BELW SEP-FEM JUNC Right 8/17/2018    Procedure: LEG PERFORATED INJECTION SCLEROTHERAPY;  Surgeon: Chon Machuca MD;  [facility];  Service: Vascular    TOTAL KNEE ARTHROPLASTY Left     VASCULAR SURGERY      VENA CAVA FILTER PLACEMENT      Interruption inferior vena cava, [place] filter, placement    WISDOM TOOTH EXTRACTION       Social History     Tobacco Use   Smoking Status Never Smoker   Smokeless Tobacco Never Used       Past Family History  Family History   Problem Relation Age of Onset    Arthritis Mother     Stroke Mother     Arthritis Father     Cancer Father     Arthritis Daughter        Past Social history  Social History     Socioeconomic History    Marital status: /Civil Union     Spouse name: Not on file    Number of children: Not on file    Years of education: Not on file    Highest education level: Not on file   Occupational History    Not on file   Social Needs    Financial resource strain: Not on file    Food insecurity:     Worry: Not on file     Inability: Not on file    Transportation needs:     Medical: Not on file     Non-medical: Not on file Tobacco Use    Smoking status: Never Smoker    Smokeless tobacco: Never Used   Substance and Sexual Activity    Alcohol use: No    Drug use: No    Sexual activity: Not on file   Lifestyle    Physical activity:     Days per week: Not on file     Minutes per session: Not on file    Stress: Not on file   Relationships    Social connections:     Talks on phone: Not on file     Gets together: Not on file     Attends Judaism service: Not on file     Active member of club or organization: Not on file     Attends meetings of clubs or organizations: Not on file     Relationship status: Not on file    Intimate partner violence:     Fear of current or ex partner: Not on file     Emotionally abused: Not on file     Physically abused: Not on file     Forced sexual activity: Not on file   Other Topics Concern    Not on file   Social History Narrative    Not on file       Current Medications  Current Outpatient Medications   Medication Sig Dispense Refill    acetaminophen (TYLENOL) 325 mg tablet Take 2 tablets by mouth every 6 (six) hours as needed for mild pain or fever 30 tablet 0    amoxicillin (AMOXIL) 500 mg capsule TK 4 CS BEFORE 2 HOURS BEFORE DENTAL OR FACIAL SURGERY WORK  3    atorvastatin (LIPITOR) 40 mg tablet Take 40 mg by mouth daily after dinner Atorvastatin Calcium 40 MG Oral Tablet Take 1 tablet daily  Refills: 0  Active       B Complex-C (SUPER B COMPLEX PO) Take by mouth      candesartan (ATACAND) 16 mg tablet Take 1 tablet (16 mg total) by mouth daily 90 tablet 3    cholecalciferol (VITAMIN D3) 1,000 units tablet Take 2,000 Units by mouth daily      Iron Combinations (NIFEREX) TABS Take one tablet daily  5    meloxicam (MOBIC) 15 mg tablet Take by mouth      metFORMIN (GLUCOPHAGE) 1000 MG tablet Take 1,000 mg by mouth daily after dinner MetFORMIN HCl 1000 MG (MOD) TB24 Take one tablet daily  Refills: 0  Active       Multiple Vitamin (MULTIVITAMINS PO) Take 1 tablet by mouth daily      omeprazole (PriLOSEC) 20 mg delayed release capsule Omeprazole 20 MG Oral Tablet Delayed Release Take 1 tablet daily  Refills: 0  Active      potassium chloride (K-DUR,KLOR-CON) 20 mEq tablet Take 20 mEq by mouth daily after dinner Potassium Chloride Jesusita ER 20 MEQ Oral Tablet Extended Release Take 1 tablet daily  Refills: 0  Active       sotalol (BETAPACE) 80 mg tablet TK 1 T PO Q DAY AT 9 AM AND 9 PM  2    torsemide (DEMADEX) 20 mg tablet TAKE 1 TABLET(20 MG) BY MOUTH TWICE DAILY 180 tablet 0    warfarin (COUMADIN) 4 mg tablet Take 4 mg by mouth 2 (two) times a week Patient take 4mg everyday but Tuesday and Friday   warfarin (COUMADIN) 6 mg tablet Take 6 mg by mouth 2 (two) times a week The patient takes on Tuesday and Fridays       No current facility-administered medications for this visit  Allergies  No Known Allergies    The following portions of the patient's history were reviewed and updated as appropriate: allergies, current medications, past medical history, past social history, past surgical history and problem list     Vitals  Vitals:    08/01/19 0833   BP: 138/78   BP Location: Left arm   Patient Position: Sitting   Cuff Size: Large   Pulse: 68   Weight: (!) 154 kg (338 lb 12 8 oz)   Height: 6' 1" (1 854 m)     Physical Exam  Physical Exam   Constitutional: He is oriented to person, place, and time  He appears well-developed and well-nourished  HENT:   Head: Atraumatic  Eyes: EOM are normal    Neck: Neck supple  Pulmonary/Chest: Effort normal  No respiratory distress  Abdominal: Soft  Bowel sounds are normal    Musculoskeletal: Normal range of motion  Neurological: He is alert and oriented to person, place, and time  Skin: Skin is warm and dry  Psychiatric: He has a normal mood and affect  Vitals reviewed  Results  No results found for this or any previous visit (from the past 1 hour(s))  ]  No results found for: PSA  Lab Results   Component Value Date    GLUCOSE 121 10/10/2015    CALCIUM 8 7 08/11/2018     10/10/2015    K 4 0 08/11/2018    CO2 27 08/11/2018     08/11/2018    BUN 34 (H) 08/11/2018    CREATININE 1 56 (H) 08/11/2018     Lab Results   Component Value Date    WBC 4 51 08/11/2018    HGB 11 1 (L) 08/11/2018    HCT 34 8 (L) 08/11/2018    MCV 93 08/11/2018     08/11/2018     Orders  Orders Placed This Encounter   Procedures    POCT Measure PVR     oJdi Gómez, [address] [facility], [address]  8/1/2019  8:25 AM  Sign at close encounter  8/1/2019      No chief complaint on file  Assessment and Plan    81 y o  male managed by Dr Mi Tee    1  Benign prostatic hyperplasia  · Bladder scan PVR    2  Hematuria  ·       History of Present Illness  Ever Uribe is a 81 y o  male here for follow up evaluation of  urinary symptoms related to benign prostatic hyperplasia and gross hematuria  On last office visit dated 07/09/2019 patient complained of intermittent gross hematuria and urine dip is positive for trace blood and trace leukocytes  At that time, cephalexin 500 mg p o  Q 6 hours x7 days was provided  Past medical history includes diabetes, benign prostatic hyperplasia, hypertension, ICD insertion, atrial fibrillation treated with Coumadin          Review of Systems               Past Medical History  Past Medical History:   Diagnosis Date    Anemia     Arthritis     [facility]     CHF (congestive heart failure) ([address] )     Diabetes mellitus ([address] )     Niddm    DVT (deep vein thrombosis) in pregnancy ([address] ) 1966    not in pregnancy    DVT (deep venous thrombosis) ([address] ) 1966    Dyslipidemia     GERD (gastroesophageal reflux disease)     Hyperlipidemia     Hypertension     Irregular heart beat     Afib    Morbid obesity due to excess calories ([address] )     Resolved 9/2/2014     Pulmonary embolism ([address] )     Sepsis ([address] )     Visual impairment        Past Social History  Past Surgical History:   Procedure Laterality Date  AORTIC VALVE REPLACEMENT      CARDIAC DEFIBRILLATOR PLACEMENT  04/2014    CARDIAC SURGERY  02/2014    AVR    COLONOSCOPY      INSERT / REPLACE / REMOVE PACEMAKER      JOINT REPLACEMENT Left     LTKR    ID LIGATE/STRIP LONG SAPH VEIN BELW SEP-FEM JUNC Right 8/17/2018    Procedure: LEG PERFORATED INJECTION SCLEROTHERAPY;  Surgeon: Ericka Jackson MD;  [facility];  Service: Vascular    TOTAL KNEE ARTHROPLASTY Left     VASCULAR SURGERY      VENA CAVA FILTER PLACEMENT      Interruption inferior vena cava, [place] filter, placement    WISDOM TOOTH EXTRACTION       Social History     Tobacco Use   Smoking Status Never Smoker   Smokeless Tobacco Never Used       Past Family History  Family History   Problem Relation Age of Onset    Arthritis Mother     Stroke Mother     Arthritis Father     Cancer Father     Arthritis Daughter        Past Social history  Social History     Socioeconomic History    Marital status: /Civil Union     Spouse name: Not on file    Number of children: Not on file    Years of education: Not on file    Highest education level: Not on file   Occupational History    Not on file   Social Needs    Financial resource strain: Not on file    Food insecurity:     Worry: Not on file     Inability: Not on file    Transportation needs:     Medical: Not on file     Non-medical: Not on file   Tobacco Use    Smoking status: Never Smoker    Smokeless tobacco: Never Used   Substance and Sexual Activity    Alcohol use: No    Drug use: No    Sexual activity: Not on file   Lifestyle    Physical activity:     Days per week: Not on file     Minutes per session: Not on file    Stress: Not on file   Relationships    Social connections:     Talks on phone: Not on file     Gets together: Not on file     Attends Lutheran service: Not on file     Active member of club or organization: Not on file     Attends meetings of clubs or organizations: Not on file Relationship status: Not on file    Intimate partner violence:     Fear of current or ex partner: Not on file     Emotionally abused: Not on file     Physically abused: Not on file     Forced sexual activity: Not on file   Other Topics Concern    Not on file   Social History Narrative    Not on file       Current Medications  Current Outpatient Medications   Medication Sig Dispense Refill    acetaminophen (TYLENOL) 325 mg tablet Take 2 tablets by mouth every 6 (six) hours as needed for mild pain or fever 30 tablet 0    amoxicillin (AMOXIL) 500 mg capsule TK 4 CS BEFORE 2 HOURS BEFORE DENTAL OR FACIAL SURGERY WORK  3    atorvastatin (LIPITOR) 40 mg tablet Take 40 mg by mouth daily after dinner Atorvastatin Calcium 40 MG Oral Tablet Take 1 tablet daily  Refills: 0  Active       B Complex-C (SUPER B COMPLEX PO) Take by mouth      candesartan (ATACAND) 16 mg tablet Take 1 tablet (16 mg total) by mouth daily 90 tablet 3    cholecalciferol (VITAMIN D3) 1,000 units tablet Take 2,000 Units by mouth daily      Iron Combinations (NIFEREX) TABS Take one tablet daily  5    meloxicam (MOBIC) 15 mg tablet Take by mouth      metFORMIN (GLUCOPHAGE) 1000 MG tablet Take 1,000 mg by mouth daily after dinner MetFORMIN HCl 1000 MG (MOD) TB24 Take one tablet daily  Refills: 0  Active       Multiple Vitamin (MULTIVITAMINS PO) Take 1 tablet by mouth daily      omeprazole (PriLOSEC) 20 mg delayed release capsule Omeprazole 20 MG Oral Tablet Delayed Release Take 1 tablet daily  Refills: 0  Active      potassium chloride (K-DUR,KLOR-CON) 20 mEq tablet Take 20 mEq by mouth daily after dinner Potassium Chloride Jesusita ER 20 MEQ Oral Tablet Extended Release Take 1 tablet daily  Refills: 0  Active       sotalol (BETAPACE) 80 mg tablet TK 1 T PO Q DAY AT 9 AM AND 9 PM  2    torsemide (DEMADEX) 20 mg tablet TAKE 1 TABLET(20 MG) BY MOUTH TWICE DAILY 180 tablet 0    warfarin (COUMADIN) 4 mg tablet Take 4 mg by mouth 2 (two) times a week Patient take 4mg everyday but Tuesday and Friday   warfarin (COUMADIN) 6 mg tablet Take 6 mg by mouth 2 (two) times a week The patient takes on Tuesday and Fridays       No current facility-administered medications for this visit  Allergies  No Known Allergies      The following portions of the patient's history were reviewed and updated as appropriate: allergies, current medications, past medical history, past social history, past surgical history and problem list       Vitals  There were no vitals filed for this visit  Physical Exam  Physical Exam      Results  No results found for this or any previous visit (from the past 1 hour(s))  ]  No results found for: PSA  Lab Results   Component Value Date    GLUCOSE 121 10/10/2015    CALCIUM 8 7 08/11/2018     10/10/2015    K 4 0 08/11/2018    CO2 27 08/11/2018     08/11/2018    BUN 34 (H) 08/11/2018    CREATININE 1 56 (H) 08/11/2018     Lab Results   Component Value Date    WBC 4 51 08/11/2018    HGB 11 1 (L) 08/11/2018    HCT 34 8 (L) 08/11/2018    MCV 93 08/11/2018     08/11/2018     Orders  No orders of the defined types were placed in this encounter      ODESSA Cheng

## 2019-08-01 NOTE — PROGRESS NOTES
Daily Note     Today's date: 2019  Patient name: Phillip Negrete  : 1935  MRN: [id number]  Referring provider: January Casey MD  Dx:   Encounter Diagnosis     ICD-10-CM    1  Osteoarthritis of right knee, unspecified osteoarthritis type M17 11    2  Ambulatory dysfunction R26 2    3  Decreased functional mobility and endurance Z74 09        Start Time: 1645  Stop Time: 1730  Total time in clinic (min): 45 minutes  Assessment    Patient is a very pleasant male who was referred to Out-patient PT due to functional limitations secondary to pain Right knee  Patient has made great progress in pain level, improved ROM and manual test as well as improved tolerance to exercise and ADL's  At this time he has achieved a plateau in mobility and symptom improvement and is ready to be discharged  He is Independent with his HEP at this time      Goals  Short Term:  Pt will report decreased levels of pain by at least 2 subjective ratings in 4 weeks-met  Pt will demonstrate improved ROM by at least 10 degrees in 4 weeks-partially met  Pt will demonstrate improved strength by 1/2 grade MMT in 4 weeks-partially met  Long Term:   Pt will be independent in their HEP in 8 weeks-MET  Pt will demonstrate improved FOTO, > 45-partially met (35)  Pt will be independent with all ADL's-MET  Plan  Dischage at this time          Subjective Evaluation     Pain  At best pain ratin  At worst pain ratin  Location: R knee        Objective      Active Range of Motion      Right Knee   Flexion: 103 degrees   Extension: 4 degrees      Passive Range of Motion      Right Knee   Flexion: 105 degrees   Extension: 0 degrees      Strength/Myotome Testing      Right Hip   Planes of Motion   Flexion: 4  Abduction: 4     Right Knee   Flexion: 4+  Extension: 4+     General Comments: client has achieved a plateau                 Daily Treatment Diary   Precautions: OA, a-fib, CHF, diabetes, HTN, hx of DVT, pacemaker, L TKA,      Manual     7/11 7/18 7/22 7/25  8/1             Posterior right tibiofemoral JM's Gr 3  TP  TP  PT,TP  5 mins     TP  KEN,PT  5 mins               Patellar ROM PRN      NP    NP  KS              ROM / MS manual re-assessment            KS                   Exercise Diary   7/8 7/11 7/18 7/22 7/25  81           Nustep L7 x10'  L7 x10'  L6 x 10 mins  L6 x10'  L6  10 mins  L6 10 min           Heel slides 10"x10  10"x10  10"x10  10"x10  10"x10  10" x 10           Bridges  3"x15  3"x15  3"x20  3"x20  3"x20  3"x20           SLR flex  20  20  2x10  20  2x10  2 x 10           Clamshells  15  15  2x10  20  20   20           LAQ  3"x25  3"x25  3"x30  3"x30  3"x30  3" x 30           HS curls  20  20  20  20  20 ea   20ea           Step ups  4"x15  4"x15  4"x20  4"x20  4"x20  4" 20           Mini squats pain-free  20  20  2x10  20  2x10  2 x10           Sit to stand  10 biodex foam  10 biodex foam   10   Biodex  foam  10 biodex foam  15  biodex  foam  15 x biodex foam           Side stepping  x3  x3  3 laps  x3  x3  x 3 laps           Standing hip ABD  20  20  20  20  20 ea    B/L  20 ea B/L

## 2019-08-01 NOTE — PROGRESS NOTES
8/1/2019      Chief Complaint   Patient presents with   Yuri Giordano Hematuria     2 week f/u     Assessment and Plan    81 y o  male managed by Dr Carter Quinonez    1  Benign prostatic hyperplasia  · Bladder scan PVR not performed due to patient inability to void    2  Hematuria  · Resolved  · Urine for cytology negative for malignancy performed 07/09/2019      History of Present Illness  Essie Gannon is a 81 y o  male here for follow up evaluation of  urinary symptoms related to benign prostatic hyperplasia and gross hematuria  On last office visit dated 07/09/2019 patient complained of intermittent gross hematuria and urine dip is positive for trace blood and trace leukocytes  At that time, cephalexin 500 mg p o  Q 6 hours x7 days was provided  Pt reports full resolution of urinary symptoms and hematuria  He currently has no complaints today on examination  Past medical history includes diabetes, benign prostatic hyperplasia, hypertension, ICD insertion, atrial fibrillation treated with Coumadin  Review of Systems   Constitutional: Negative for chills and fever  Respiratory: Negative for cough and shortness of breath  Cardiovascular: Negative for chest pain  Gastrointestinal: Negative for abdominal distention, abdominal pain, blood in stool, nausea and vomiting  Genitourinary: Negative for difficulty urinating, dysuria, enuresis, flank pain, frequency, hematuria and urgency  Skin: Negative for rash       Past Medical History  Past Medical History:   Diagnosis Date    Anemia     Arthritis     At[facility] fibrillation ([facility] )     [facility]est[facility] failure) [facility])     Diabetes mellitus ([facility] )     Niddm    DVT (deep vein thrombosis) in pregnancy ([facility] ) 1966    not in pregnancy    DVT (deep venous thrombosis) ([place] [address] ) 1966    Dyslipidemia     GERD (gastroesophageal reflux disease)     Hyperlipidemia     Hypertension     Irregular heart beat     Afib    Morbid obesity due to excess calories ([facility] ) Resolved 9/2/2014     Pulmonary embolism (HCC)     Sepsis ([facility] )     Visual impairment        Past Social History  Past Surgical History:   Procedure Laterality Date    AORTIC VALVE REPLACEMENT      CARDIAC DEFIBRILLATOR PLACEMENT  04/2014    CARDIAC SURGERY  02/2014    AVR    COLONOSCOPY      INSERT / REPLACE / REMOVE PACEMAKER      JOINT REPLACEMENT Left     LTKR    [place] LIGATE/STRIP LONG SAPH VEIN BELW SEP-FEM JUNC Right 8/17/2018    Procedure: LEG PERFORATED INJECTION SCLEROTHERAPY;  Surgeon: Radha Dowell MD;  [facility];  Service: Vascular    TOTAL KNEE ARTHROPLASTY Left     VASCULAR SURGERY      VENA CAVA FILTER PLACEMENT      Interruption inferior vena cava, [place] filter, placement    WISDOM TOOTH EXTRACTION       Social History     Tobacco Use   Smoking Status Never Smoker   Smokeless Tobacco Never Used       Past Family History  Family History   Problem Relation Age of Onset    Arthritis Mother     Stroke Mother     Arthritis Father     Cancer Father     Arthritis Daughter        Past Social history  Social History     Socioeconomic History    Marital status: /Civil Union     Spouse name: Not on file    Number of children: Not on file    Years of education: Not on file    Highest education level: Not on file   Occupational History    Not on file   Social Needs    Financial resource strain: Not on file    Food insecurity:     Worry: Not on file     Inability: Not on file    Transportation needs:     Medical: Not on file     Non-medical: Not on file   Tobacco Use    Smoking status: Never Smoker    Smokeless tobacco: Never Used   Substance and Sexual Activity    Alcohol use: No    Drug use: No    Sexual activity: Not on file   Lifestyle    Physical activity:     Days per week: Not on file     Minutes per session: Not on file    Stress: Not on file   Relationships    Social connections:     Talks on phone: Not on file     Gets together: Not on file Attends [employer] service: Not on file     Active member of club or organization: Not on file     Attends meetings of clubs or organizations: Not on file     Relationship status: Not on file    Intimate partner violence:     Fear of current or ex partner: Not on file     Emotionally abused: Not on file     Physically abused: Not on file     Forced sexual activity: Not on file   Other Topics Concern    Not on file   Social History Narrative    Not on file       Current Medications  Current Outpatient Medications   Medication Sig Dispense Refill    acetaminophen (TYLENOL) 325 mg tablet Take 2 tablets by mouth every 6 (six) hours as needed for mild pain or fever 30 tablet 0    amoxicillin (AMOXIL) 500 mg capsule TK 4 CS BEFORE 2 HOURS BEFORE DENTAL OR FACIAL SURGERY WORK  3    atorvastatin (LIPITOR) 40 mg tablet Take 40 mg by mouth daily after dinner Atorvastatin Calcium 40 MG Oral Tablet Take 1 tablet daily  Refills: 0  Active       B Complex-C (SUPER B COMPLEX PO) Take by mouth      candesartan (ATACAND) 16 mg tablet Take 1 tablet (16 mg total) by mouth daily 90 tablet 3    cholecalciferol (VITAMIN D3) 1,000 units tablet Take 2,000 Units by mouth daily      Iron Combinations (NIFEREX) TABS Take one tablet daily  5    meloxicam (MOBIC) 15 mg tablet Take by mouth      metFORMIN (GLUCOPHAGE) 1000 MG tablet Take 1,000 mg by mouth daily after dinner MetFORMIN HCl 1000 MG (MOD) TB24 Take one tablet daily  Refills: 0  Active       Multiple Vitamin (MULTIVITAMINS PO) Take 1 tablet by mouth daily      omeprazole (PriLOSEC) 20 mg delayed release capsule Omeprazole 20 MG Oral Tablet Delayed Release Take 1 tablet daily  Refills: 0  Active      potassium chloride (K-DUR,KLOR-CON) 20 mEq tablet Take 20 mEq by mouth daily after dinner Potassium Chloride Jesusita ER 20 MEQ Oral Tablet Extended Release Take 1 tablet daily  Refills: 0  Active       sotalol (BETAPACE) 80 mg tablet TK 1 T PO Q DAY AT 9 AM AND 9 PM  2    torsemide (DEMADEX) 20 mg tablet TAKE 1 TABLET(20 MG) BY MOUTH TWICE DAILY 180 tablet 0    warfarin (COUMADIN) 4 mg tablet Take 4 mg by mouth 2 (two) times a week Patient take 4mg everyday but Tuesday and Friday   warfarin (COUMADIN) 6 mg tablet Take 6 mg by mouth 2 (two) times a week The patient takes on Tuesday and Fridays       No current facility-administered medications for this visit  Allergies  No Known Allergies    The following portions of the patient's history were reviewed and updated as appropriate: allergies, current medications, past medical history, past social history, past surgical history and problem list     Vitals  Vitals:    08/01/19 0833   BP: 138/78   BP Location: Left arm   Patient Position: Sitting   Cuff Size: Large   Pulse: 68   Weight: (!) 154 kg (338 lb 12 8 oz)   Height: 6' 1" (1 854 m)     Physical Exam  Physical Exam   Constitutional: He is oriented to person, place, and time  He appears well-developed and well-nourished  HENT:   Head: Atraumatic  Eyes: EOM are normal    Neck: Neck supple  Pulmonary/Chest: Effort normal  No respiratory distress  Abdominal: Soft  Bowel sounds are normal    Musculoskeletal: Normal range of motion  Neurological: He is alert and oriented to person, place, and time  Skin: Skin is warm and dry  Psychiatric: He has a normal mood and affect  Vitals reviewed  Results  No results found for this or any previous visit (from the past 1 hour(s))  ]  No results found for: PSA  Lab Results   Component Value Date    GLUCOSE 121 10/10/2015    CALCIUM 8 7 08/11/2018     10/10/2015    K 4 0 08/11/2018    CO2 27 08/11/2018     08/11/2018    BUN 34 (H) 08/11/2018    CREATININE 1 56 (H) 08/11/2018     Lab Results   Component Value Date    WBC 4 51 08/11/2018    HGB 11 1 (L) 08/11/2018    HCT 34 8 (L) 08/11/2018    MCV 93 08/11/2018     08/11/2018     Orders  Orders Placed This Encounter   Procedures    POCT Measure PVR ODESSA Torres

## 2019-08-08 ENCOUNTER — TELEPHONE (OUTPATIENT)
Dept: VASCULAR SURGERY | Facility: CLINIC | Age: 84
End: 2019-08-08

## 2019-08-08 NOTE — TELEPHONE ENCOUNTER
Pt called because 2 days ago he noticed a lump on the outside of his left calf about 3/4 inch in diameter  He states it is tender when he touches it  He denies redness or warmth to the area  Libia, please advise  Thank you

## 2019-08-08 NOTE — TELEPHONE ENCOUNTER
Pt denies any ulceration, wounds, trauma, or bites to the area  He reports no other symptoms other than "tenderness" when he touches it

## 2019-08-13 LAB
INR PPP: 2.4
PROTHROMBIN TIME: 24.2 SEC (ref 9–11.5)

## 2019-08-15 ENCOUNTER — ANTICOAG VISIT (OUTPATIENT)
Dept: INTERNAL MEDICINE CLINIC | Facility: CLINIC | Age: 84
End: 2019-08-15

## 2019-09-09 LAB
INR PPP: 1.9
PROTHROMBIN TIME: 19.4 SEC (ref 9–11.5)

## 2019-09-10 ENCOUNTER — ANTICOAG VISIT (OUTPATIENT)
Dept: INTERNAL MEDICINE CLINIC | Facility: CLINIC | Age: 84
End: 2019-09-10

## 2019-10-09 ENCOUNTER — ANTICOAG VISIT (OUTPATIENT)
Dept: INTERNAL MEDICINE CLINIC | Facility: CLINIC | Age: 84
End: 2019-10-09

## 2019-10-10 ENCOUNTER — REMOTE DEVICE CLINIC VISIT (OUTPATIENT)
Dept: CARDIOLOGY CLINIC | Facility: CLINIC | Age: 84
End: 2019-10-10
Payer: MEDICARE

## 2019-10-10 DIAGNOSIS — Z95.810 PRESENCE OF AUTOMATIC CARDIOVERTER/DEFIBRILLATOR (AICD): Primary | ICD-10-CM

## 2019-10-10 PROCEDURE — 93295 DEV INTERROG REMOTE 1/2/MLT: CPT | Performed by: INTERNAL MEDICINE

## 2019-10-10 PROCEDURE — 93296 REM INTERROG EVL PM/IDS: CPT | Performed by: INTERNAL MEDICINE

## 2019-10-10 NOTE — PROGRESS NOTES
Results for orders placed or performed in visit on 10/10/19   Cardiac EP device report    Narrative    MDT DUAL CHAMBER ICD  CARELINK TRANSMISSION: BATTERY VOLTAGE ADEQUATE  (6 YRS)  AP 2%  <1%  ALL AVAILABLE LEAD PARAMETERS WITHIN NORMAL LIMITS  NO SIGNIFICANT HIGH RATE EPISODES  NORMAL DEVICE FUNCTION  ---HAWKINS

## 2019-10-11 ENCOUNTER — ANTICOAG VISIT (OUTPATIENT)
Dept: INTERNAL MEDICINE CLINIC | Facility: CLINIC | Age: 84
End: 2019-10-11

## 2019-10-15 ENCOUNTER — ANTICOAG VISIT (OUTPATIENT)
Dept: INTERNAL MEDICINE CLINIC | Facility: CLINIC | Age: 84
End: 2019-10-15

## 2019-10-16 ENCOUNTER — OFFICE VISIT (OUTPATIENT)
Dept: CARDIOLOGY CLINIC | Facility: CLINIC | Age: 84
End: 2019-10-16
Payer: MEDICARE

## 2019-10-16 VITALS
HEART RATE: 69 BPM | SYSTOLIC BLOOD PRESSURE: 134 MMHG | WEIGHT: 315 LBS | DIASTOLIC BLOOD PRESSURE: 82 MMHG | BODY MASS INDEX: 41.75 KG/M2 | HEIGHT: 73 IN

## 2019-10-16 DIAGNOSIS — Z95.810 PRESENCE OF IMPLANTABLE CARDIOVERTER-DEFIBRILLATOR (ICD): ICD-10-CM

## 2019-10-16 DIAGNOSIS — I47.2 VENTRICULAR TACHYARRHYTHMIA (HCC): Primary | ICD-10-CM

## 2019-10-16 DIAGNOSIS — I47.2 VENTRICULAR TACHYCARDIA (HCC): ICD-10-CM

## 2019-10-16 DIAGNOSIS — I10 ESSENTIAL HYPERTENSION: ICD-10-CM

## 2019-10-16 DIAGNOSIS — I48.0 PAF (PAROXYSMAL ATRIAL FIBRILLATION) (HCC): ICD-10-CM

## 2019-10-16 DIAGNOSIS — I82.591 CHRONIC DEEP VEIN THROMBOSIS (DVT) OF OTHER VEIN OF RIGHT LOWER EXTREMITY (HCC): ICD-10-CM

## 2019-10-16 DIAGNOSIS — Z95.2 S/P AVR: ICD-10-CM

## 2019-10-16 PROCEDURE — 93000 ELECTROCARDIOGRAM COMPLETE: CPT | Performed by: INTERNAL MEDICINE

## 2019-10-16 PROCEDURE — 99214 OFFICE O/P EST MOD 30 MIN: CPT | Performed by: INTERNAL MEDICINE

## 2019-10-16 NOTE — PROGRESS NOTES
Cardiology   Era Blair 81 y o  male MRN: [id number]        Impression:  1  s/p aortic valve replacement for endocarditis - doing well  Echo 4/45 Nml LV systolic function and normal bio AVR  2  Hypertension - borderline  3  s/p ICD for ventricular tachycardia  4  DVT - on warfarin   5  Paroxysmal atrial fibrillation - on Sotalol  In normal sinus rhythm  6  Chronic diastolic heart failure - compensated      Recommendations:  1  Continue current medications  2  Follow up in 6 months  HPI: Era Blair is a 81y o  year old male with morbid obesity, hypertension, recent aortic valve replacement for endocarditis who returns for follow up  Asymptomatic from a cardiac standpoint - no chest pain, shortness of breath, or palpitations  Does have some dyspnea on exertion  Review of Systems   Constitutional: Negative  HENT: Negative  Eyes: Negative  Respiratory: Negative for chest tightness and shortness of breath  Cardiovascular: Negative for chest pain, palpitations and leg swelling  Gastrointestinal: Negative  Endocrine: Negative  Genitourinary: Negative  Musculoskeletal: Negative  Skin: Negative  Allergic/Immunologic: Negative  Neurological: Negative  Hematological: Negative  Psychiatric/Behavioral: Negative  All other systems reviewed and are negative          Past Medical History:   Diagnosis Date    Anemia     Arthritis     [facility]     [facility]     Diabetes mellitus ([facility] )     Niddm    DVT (deep vein thrombosis) in pregnancy 1966    not in pregnancy    DVT (deep venous thrombosis) ([address] ) 1966    Dyslipidemia     GERD (gastroesophageal reflux disease)     Hyperlipidemia     Hypertension     Irregular heart beat     Afib    Morbid obesity due to excess calories ([address] )     Resolved 9/2/2014     Pulmonary embolism ([address] )     Sepsis ([address] )     Visual impairment      Past Surgical History:   Procedure Laterality Date    AORTIC VALVE REPLACEMENT      CARDIAC DEFIBRILLATOR PLACEMENT  04/2014   Sharon Rodriguez CARDIAC SURGERY  02/2014    AVR    COLONOSCOPY      INSERT / REPLACE / REMOVE PACEMAKER      JOINT REPLACEMENT Left     LTKR    [place] LIGATE/STRIP LONG SAPH VEIN BELW SEP-FEM JUNC Right 8/17/2018    Procedure: LEG PERFORATED INJECTION SCLEROTHERAPY;  Surgeon: Viviane Quijano MD;  [facility];  Service: Vascular    TOTAL KNEE ARTHROPLASTY Left     VASCULAR SURGERY      VENA CAVA FILTER PLACEMENT      Interruption inferior vena cava, [place] filter, placement    WISDOM TOOTH EXTRACTION       Social History     Substance and Sexual Activity   Alcohol Use No     Social History     Substance and Sexual Activity   Drug Use No     Social History     Tobacco Use   Smoking Status Never Smoker   Smokeless Tobacco Never Used     Family History   Problem Relation Age of Onset    Arthritis Mother     Stroke Mother     Arthritis Father     Cancer Father     Arthritis Daughter        Allergies:  No Known Allergies    Medications:     Current Outpatient Medications:     atorvastatin (LIPITOR) 40 mg tablet, Take 40 mg by mouth daily after dinner Atorvastatin Calcium 40 MG Oral Tablet Take 1 tablet daily  Refills: 0  Active , Disp: , Rfl:     B Complex-C (SUPER B COMPLEX PO), Take by mouth, Disp: , Rfl:     candesartan (ATACAND) 16 mg tablet, Take 1 tablet (16 mg total) by mouth daily, Disp: 90 tablet, Rfl: 3    cholecalciferol (VITAMIN D3) 1,000 units tablet, Take 2,000 Units by mouth daily, Disp: , Rfl:     Iron Combinations (NIFEREX) TABS, Take one tablet daily, Disp: , Rfl: 5    meloxicam (MOBIC) 15 mg tablet, Take by mouth, Disp: , Rfl:     metFORMIN (GLUCOPHAGE) 1000 MG tablet, Take 1,000 mg by mouth daily after dinner MetFORMIN HCl 1000 MG (MOD) TB24 Take one tablet daily  Refills: 0  Active , Disp: , Rfl:     Multiple Vitamin (MULTIVITAMINS PO), Take 1 tablet by mouth daily, Disp: , Rfl:    omeprazole (PriLOSEC) 20 mg delayed release capsule, Omeprazole 20 MG Oral Tablet Delayed Release Take 1 tablet daily  Refills: 0  Active, Disp: , Rfl:     potassium chloride (K-DUR,KLOR-CON) 20 mEq tablet, Take 20 mEq by mouth daily after dinner Potassium Chloride Jesusita ER 20 MEQ Oral Tablet Extended Release Take 1 tablet daily  Refills: 0  Active , Disp: , Rfl:     sotalol (BETAPACE) 80 mg tablet, TK 1 T PO Q DAY AT 9 AM AND 9 PM, Disp: , Rfl: 2    torsemide (DEMADEX) 20 mg tablet, TAKE 1 TABLET(20 MG) BY MOUTH TWICE DAILY, Disp: 180 tablet, Rfl: 0    warfarin (COUMADIN) 4 mg tablet, Take 1 tablet (4 mg total) by mouth 2 (two) times a week Patient take 4mg everyday but Tuesday and Friday , Disp: 90 tablet, Rfl: 0    warfarin (COUMADIN) 6 mg tablet, Take 6 mg by mouth 2 (two) times a week The patient takes on Tuesday and Fridays, Disp: , Rfl:     acetaminophen (TYLENOL) 325 mg tablet, Take 2 tablets by mouth every 6 (six) hours as needed for mild pain or fever (Patient not taking: Reported on 10/16/2019), Disp: 30 tablet, Rfl: 0    amoxicillin (AMOXIL) 500 mg capsule, TK 4 CS BEFORE 2 HOURS BEFORE DENTAL OR FACIAL SURGERY WORK, Disp: , Rfl: 3      Wt Readings from Last 3 Encounters:   10/16/19 (!) 152 kg (334 lb)   08/01/19 (!) 154 kg (338 lb 12 8 oz)   07/09/19 (!) 155 kg (341 lb 12 8 oz)     Temp Readings from Last 3 Encounters:   06/04/19 98 4 °F (36 9 °C) (Tympanic)   09/20/18 98 3 °F (36 8 °C) (Tympanic)   08/21/18 97 6 °F (36 4 °C) (Tympanic)     BP Readings from Last 3 Encounters:   10/16/19 134/82   08/01/19 138/78   07/09/19 126/78     Pulse Readings from Last 3 Encounters:   10/16/19 69   08/01/19 68   07/09/19 62         Physical Exam   Constitutional: He is oriented to person, place, and time  He appears well-developed  HENT:   Head: Atraumatic  Eyes: EOM are normal    Neck: Normal range of motion  Cardiovascular: Normal rate and regular rhythm  Murmur heard     Systolic murmur is present with a grade of 2/6  Pulmonary/Chest: Effort normal and breath sounds normal  No respiratory distress  Abdominal: Soft  Musculoskeletal: Normal range of motion  Neurological: He is alert and oriented to person, place, and time  Skin: Skin is warm and dry  Psychiatric: He has a normal mood and affect  Laboratory Studies:  CMP:  Lab Results   Component Value Date     10/10/2015    K 4 0 2018     2018    CO2 27 2018    ANIONGAP 8 10/10/2015    BUN 34 (H) 2018    CREATININE 1 56 (H) 2018    GLUCOSE 121 10/10/2015    AST 19 2017    ALT 20 2017    BILITOT 2 23 (H) 10/10/2015    EGFR 41 2018         Cardiac testing:   EKG reviewed personally: NSR 69 1st deg AVB LBBB  Results for orders placed during the hospital encounter of 19   Echo complete with contrast if indicated    N[facility]ative [facility]  [address] [place] [address]  [phone]    Transthoracic Echocardiogram  2D, M-mode, Doppler, and Color Doppler    Study date:  2019    Patient: Francia Essex  MR number: [id number]  Account number: [de-identified]  : 1935  Age: 81 years  Gender: Male  Status: Outpatient  Location: [address] [place] [address] [facility] [place]  Height: 73 in  Weight: 338 4 lb  BP: 124/ 70 mmHg    Indications: Aortic Valve Disorder    Diagnoses: I35 9 - Nonrheumatic aortic valve disorder, unspecified    Sonographer:  Fabian Campbell RDCS  Primary Physician:  Karan Oliver MD  Referring Physician:  Dionicio Weiss MD  Group:  [id number]  Interpreting Physician:  Jarred Wakefield MD    SUMMARY    LEFT VENTRICLE:  Size was at the upper limits of normal   Systolic function was normal  Ejection fraction was estimated in the range of 65 % to 70 %  There were no regional wall motion abnormalities  Wall thickness was increased  There was moderate concentric hypertrophy    There was an increased relative contribution of atrial contraction to ventricular filling  LEFT ATRIUM:  The atrium was moderately dilated  RIGHT ATRIUM:  The atrium was mildly dilated  MITRAL VALVE:  There was moderate annular calcification  There was mild regurgitation  AORTIC VALVE:  A bioprosthesis was present  It exhibited normal function  There was mild regurgitation  TRICUSPID VALVE:  There was mild regurgitation  Estimated peak PA pressure was 35 mmHg  PULMONIC VALVE:  There was trace regurgitation  HISTORY: PRIOR HISTORY: AV Endocarditis, S/P AVR, Hyperlipidemia, Hypertension, V-tach, ICD, A-fib, Heart Failure, DVT, Obesity    PROCEDURE: The study was performed in the [address]  This was a routine study  The transthoracic approach was used  The study included complete 2D imaging, M-mode, complete spectral Doppler, and color Doppler  The  heart rate was 71 bpm, at the start of the study  Images were obtained from the parasternal, apical, subcostal, and suprasternal notch acoustic windows  Echocardiographic views were limited due to decreased penetration  This was a  technically difficult study  LEFT VENTRICLE: Size was at the upper limits of normal  Systolic function was normal  Ejection fraction was estimated in the range of 65 % to 70 %  There were no regional wall motion abnormalities  Wall thickness was increased  There was  moderate concentric hypertrophy  DOPPLER: There was an increased relative contribution of atrial contraction to ventricular filling  RIGHT VENTRICLE: The size was normal  Systolic function was normal  Wall thickness was normal     LEFT ATRIUM: The atrium was moderately dilated  RIGHT ATRIUM: The atrium was mildly dilated  MITRAL VALVE: There was moderate annular calcification  DOPPLER: There was mild regurgitation  AORTIC VALVE: A bioprosthesis was present  It exhibited normal function  DOPPLER: There was mild regurgitation      TRICUSPID VALVE: DOPPLER: There was mild regurgitation  Estimated peak PA pressure was 35 mmHg  PULMONIC VALVE: DOPPLER: There was trace regurgitation  PERICARDIUM: There was no pericardial effusion  AORTA: The root exhibited normal size  SYSTEMIC VEINS: IVC: The inferior vena cava was not well visualized  SYSTEM MEASUREMENT TABLES    2D  %FS: 35 22 %  Ao Diam: 2 88 cm  EDV(Teich): 194 07 ml  EF(Cube): 72 82 %  EF(Teich): 63 57 %  ESV(Cube): 64 82 ml  ESV(Teich): 70 71 ml  IVSd: 1 47 cm  LA Area: 26 99 cm2  LA Diam: 4 78 cm  LVEDV MOD A4C: 173 24 ml  LVEF MOD A4C: 63 99 %  LVESV MOD A4C: 62 39 ml  LVIDd: 6 2 cm  LVIDs: 4 02 cm  LVLd A4C: 8 88 cm  LVLs A4C: 7 87 cm  LVOT Diam: 1 8 cm  LVPWd: 0 86 cm  RA Area: 22 24 cm2  RV Diam : 3 6 cm  SI(Cube): 64 31 ml/m2  SI(Teich): 45 69 ml/m2  SV MOD A4C: 110 85 ml  SV(Cube): 173 65 ml  SV(Teich): 123 37 ml    CW  AV Env  Ti: 273 57 ms  AV VTI: 35 86 cm  AV Vmax: 2 m/s  AV Vmean: 1 3 m/s  AV maxP 26 mmHg  AV meanP 02 mmHg  TR Vmax: 2 9 m/s  TR maxP 56 mmHg    MM  TAPSE: 1 09 cm    PW  E': 0 05 m/s  E/E': 27 12  MV A Juan: 1 23 m/s  MV Dec [place]: 4 11 m/s2  MV DecT: 302 23 ms  MV E Juan: 1 24 m/s  MV E/A Ratio: 1 01    [employer] Accredited Echocardiography Laboratory    Prepared and electronically signed by    Curtis Parry MD  Signed [id number] 33:95:00

## 2019-10-28 ENCOUNTER — ANTICOAG VISIT (OUTPATIENT)
Dept: INTERNAL MEDICINE CLINIC | Facility: CLINIC | Age: 84
End: 2019-10-28

## 2019-11-04 ENCOUNTER — ANTICOAG VISIT (OUTPATIENT)
Dept: INTERNAL MEDICINE CLINIC | Facility: CLINIC | Age: 84
End: 2019-11-04

## 2020-01-13 ENCOUNTER — REMOTE DEVICE CLINIC VISIT (OUTPATIENT)
Dept: CARDIOLOGY CLINIC | Facility: CLINIC | Age: 85
End: 2020-01-13
Payer: MEDICARE

## 2020-01-13 DIAGNOSIS — Z95.810 AICD (AUTOMATIC CARDIOVERTER/DEFIBRILLATOR) PRESENT: Primary | ICD-10-CM

## 2020-01-13 PROCEDURE — 93296 REM INTERROG EVL PM/IDS: CPT | Performed by: INTERNAL MEDICINE

## 2020-01-13 PROCEDURE — 93295 DEV INTERROG REMOTE 1/2/MLT: CPT | Performed by: INTERNAL MEDICINE

## 2020-02-20 ENCOUNTER — OFFICE VISIT (OUTPATIENT)
Dept: UROLOGY | Facility: AMBULATORY SURGERY CENTER | Age: 85
End: 2020-02-20
Payer: MEDICARE

## 2020-02-20 VITALS
SYSTOLIC BLOOD PRESSURE: 148 MMHG | HEIGHT: 73 IN | HEART RATE: 66 BPM | DIASTOLIC BLOOD PRESSURE: 78 MMHG | BODY MASS INDEX: 41.75 KG/M2 | WEIGHT: 315 LBS

## 2020-02-20 DIAGNOSIS — N40.1 BENIGN PROSTATIC HYPERPLASIA WITH LOWER URINARY TRACT SYMPTOMS, SYMPTOM DETAILS UNSPECIFIED: ICD-10-CM

## 2020-02-20 DIAGNOSIS — R31.0 GROSS HEMATURIA: Primary | ICD-10-CM

## 2020-02-20 PROCEDURE — 3077F SYST BP >= 140 MM HG: CPT | Performed by: NURSE PRACTITIONER

## 2020-02-20 PROCEDURE — 2022F DILAT RTA XM EVC RTNOPTHY: CPT | Performed by: NURSE PRACTITIONER

## 2020-02-20 PROCEDURE — 3066F NEPHROPATHY DOC TX: CPT | Performed by: NURSE PRACTITIONER

## 2020-02-20 PROCEDURE — 99213 OFFICE O/P EST LOW 20 MIN: CPT | Performed by: NURSE PRACTITIONER

## 2020-02-20 PROCEDURE — 1036F TOBACCO NON-USER: CPT | Performed by: NURSE PRACTITIONER

## 2020-02-20 PROCEDURE — 3078F DIAST BP <80 MM HG: CPT | Performed by: NURSE PRACTITIONER

## 2020-02-20 PROCEDURE — 3008F BODY MASS INDEX DOCD: CPT | Performed by: NURSE PRACTITIONER

## 2020-02-20 PROCEDURE — 4040F PNEUMOC VAC/ADMIN/RCVD: CPT | Performed by: NURSE PRACTITIONER

## 2020-02-20 PROCEDURE — 1160F RVW MEDS BY RX/DR IN RCRD: CPT | Performed by: NURSE PRACTITIONER

## 2020-02-20 NOTE — PROGRESS NOTES
2/20/2020    Juan Pablo Alcaraz  1935  [id number]      Assessment  -BPH  -Resolved gross hematuria    Discussion/Plan  Cindy Fontana is a 81 y o  male being managed by Dr Julio César Cervantes        -Patient is doing well without any urinary complaints  Reviewed dietary and behavior modifications  Encouraged patient to increase his water intake and avoid bladder irritants  He was instructed to call if he experiences recurrence in gross hematuria  Otherwise follow-up in 1 year with urinalysis  If findings remain stable, we will consider follow-up on as-needed basis at that time   -All questions answered, patient agrees with plan      History of Present Illness  81 y o  male with a history of BPH and resolved gross hematuria presents today for follow up  Patient denies any episodes of profound gross hematuria  He does report rare occurrences of darkened urine and trace bright red blood  Cause likely secondary to BPH and long-term anticoagulation on Coumadin  He reports episodes of daytime urinary frequency, but does not find bothersome  He denies any flank pain or dysuria  Patient had previously been on Flomax, but discontinued due to improvement of his symptoms  He denies any strong family history of prostate, bladder, or kidney malignancy  Patient is a lifetime nonsmoker  Review of Systems  Review of Systems   Constitutional: Negative  HENT: Negative  Respiratory: Negative  Cardiovascular: Negative  Gastrointestinal: Negative  Genitourinary: Positive for frequency  Negative for decreased urine volume, difficulty urinating, dysuria, flank pain, hematuria and urgency  Musculoskeletal: Negative  Skin: Negative  Neurological: Negative  Psychiatric/Behavioral: Negative          Past Medical History  Past Medical History:   Diagnosis Date    Anemia     Arthritis     Atrial fibrillation ([facility] )     CHF (congestive heart failure) (HCC)     Diabetes mellitus ([facility] )     Niddm    DVT (deep vein thrombosis) in pregnancy 1966    not in pregnancy    DVT (deep venous thrombosis) ([facility] ) 1966    Dyslipidemia     GERD (gastroesophageal reflux disease)     Hyperlipidemia     Hypertension     Irregular heart beat     Afib    Morbid obesity due to excess calories ([facility] )     Resolved 9/2/2014     Pulmonary embolism (HCC)     Sepsis ([facility] )     Visual impairment        Past Social History  Past Surgical History:   Procedure Laterality Date    AORTIC VALVE REPLACEMENT      CARDIAC DEFIBRILLATOR PLACEMENT  04/2014    CARDIAC SURGERY  02/2014    AVR    COLONOSCOPY      INSERT / REPLACE / REMOVE PACEMAKER      JOINT REPLACEMENT Left     LTKR    TX LIGATE/STRIP LONG SAPH VEIN BELW SEP-FEM JUNC Right 8/17/2018    Procedure: LEG PERFORATED INJECTION SCLEROTHERAPY;  Surgeon: Victoria Garcia MD;  [facility];  Service: Vascular    TOTAL KNEE ARTHROPLASTY Left     VASCULAR SURGERY      VENA CAVA FILTER PLACEMENT      Interruption inferior vena cava, [place] filter, placement    WISDOM TOOTH EXTRACTION         Past Family History  Family History   Problem Relation Age of Onset    Arthritis Mother     Stroke Mother     Arthritis Father     Cancer Father     Arthritis Daughter        Past Social history  Social History     Socioeconomic History    Marital status: /Civil Union     Spouse name: Not on file    Number of children: Not on file    Years of education: Not on file    Highest education level: Not on file   Occupational History    Not on file   Social Needs    Financial resource strain: Not on file    Food insecurity:     Worry: Not on file     Inability: Not on file    Transportation needs:     Medical: Not on file     Non-medical: Not on file   Tobacco Use    Smoking status: Never Smoker    Smokeless tobacco: Never Used   Substance and Sexual Activity    Alcohol use: No    Drug use: No    Sexual activity: Not on file   Lifestyle    Physical activity:     Days per week: Not on file     Minutes per session: Not on file    Stress: Not on file   Relationships    Social connections:     Talks on phone: Not on file     Gets together: Not on file     Attends Mu-ism service: Not on file     Active member of club or organization: Not on file     Attends meetings of clubs or organizations: Not on file     Relationship status: Not on file    Intimate partner violence:     Fear of current or ex partner: Not on file     Emotionally abused: Not on file     Physically abused: Not on file     Forced sexual activity: Not on file   Other Topics Concern    Not on file   Social History Narrative    Not on file       Current Medications  Current Outpatient Medications   Medication Sig Dispense Refill    amoxicillin (AMOXIL) 500 mg capsule TK 4 CS BEFORE 2 HOURS BEFORE DENTAL OR FACIAL SURGERY WORK  3    atorvastatin (LIPITOR) 40 mg tablet Take 40 mg by mouth daily after dinner Atorvastatin Calcium 40 MG Oral Tablet Take 1 tablet daily  Refills: 0  Active       B Complex-C (SUPER B COMPLEX PO) Take by mouth      candesartan (ATACAND) 16 mg tablet Take 1 tablet (16 mg total) by mouth daily 90 tablet 3    cholecalciferol (VITAMIN D3) 1,000 units tablet Take 2,000 Units by mouth daily      meloxicam (MOBIC) 15 mg tablet Take by mouth      metFORMIN (GLUCOPHAGE) 1000 MG tablet Take 1,000 mg by mouth daily after dinner MetFORMIN HCl 1000 MG (MOD) TB24 Take one tablet daily  Refills: 0  Active       Multiple Vitamin (MULTIVITAMINS PO) Take 1 tablet by mouth daily      omeprazole (PriLOSEC) 20 mg delayed release capsule Omeprazole 20 MG Oral Tablet Delayed Release Take 1 tablet daily  Refills: 0  Active      potassium chloride (K-DUR,KLOR-CON) 20 mEq tablet Take 20 mEq by mouth daily after dinner Potassium Chloride Jesusita ER 20 MEQ Oral Tablet Extended Release Take 1 tablet daily  Refills: 0  Active       sotalol (BETAPACE) 80 mg tablet TK 1 T PO Q DAY AT 9 AM AND 9 PM  2    torsemide (DEMADEX) 20 mg tablet TAKE 1 TABLET(20 MG) BY MOUTH TWICE DAILY 180 tablet 0    warfarin (COUMADIN) 4 mg tablet Take 1 tablet (4 mg total) by mouth 2 (two) times a week Patient take 4mg everyday but Tuesday and Friday  90 tablet 0    warfarin (COUMADIN) 6 mg tablet Take 6 mg by mouth 2 (two) times a week The patient takes on Tuesday and Fridays      acetaminophen (TYLENOL) 325 mg tablet Take 2 tablets by mouth every 6 (six) hours as needed for mild pain or fever (Patient not taking: Reported on 10/16/2019) 30 tablet 0    Iron Combinations (NIFEREX) TABS Take one tablet daily  5    mupirocin (BACTROBAN) 2 % ointment ANUSHA SPARINGLY EXT AA BID       No current facility-administered medications for this visit  Allergies  No Known Allergies    Past Medical History, Social History, Family History, medications and allergies were reviewed  Vitals  Vitals:    02/20/20 0813   BP: 148/78   BP Location: Left arm   Patient Position: Sitting   Cuff Size: Adult   Pulse: 66   Weight: (!) 158 kg (347 lb 6 4 oz)   Height: 6' 1" (1 854 m)       Physical Exam  Physical Exam   Constitutional: He is oriented to person, place, and time  He appears well-developed and well-nourished  Obese     HENT:   Head: Normocephalic  Eyes: Pupils are equal, round, and reactive to light  Neck: Normal range of motion  Cardiovascular: Normal rate and regular rhythm  Pulmonary/Chest: Effort normal    Abdominal: Soft  Normal appearance  He exhibits no distension  There is no tenderness  There is no CVA tenderness  Musculoskeletal: Normal range of motion  Neurological: He is alert and oriented to person, place, and time  Skin: Skin is warm and dry  Psychiatric: He has a normal mood and affect   His behavior is normal  Judgment and thought content normal        Results    I have personally reviewed all pertinent lab results and reviewed with patient  No results found for: PSA  Lab Results   Component Value Date    GLUCOSE 121 10/10/2015    CALCIUM 8 7 08/11/2018     10/10/2015    K 4 0 08/11/2018    CO2 27 08/11/2018     08/11/2018    BUN 34 (H) 08/11/2018    CREATININE 1 56 (H) 08/11/2018     Lab Results   Component Value Date    WBC 4 51 08/11/2018    HGB 11 1 (L) 08/11/2018    HCT 34 8 (L) 08/11/2018    MCV 93 08/11/2018     08/11/2018     No results found for this or any previous visit (from the past 1 hour(s))

## 2020-04-06 ENCOUNTER — TELEMEDICINE (OUTPATIENT)
Dept: CARDIOLOGY CLINIC | Facility: CLINIC | Age: 85
End: 2020-04-06
Payer: MEDICARE

## 2020-04-06 VITALS
HEIGHT: 73 IN | WEIGHT: 315 LBS | SYSTOLIC BLOOD PRESSURE: 145 MMHG | HEART RATE: 63 BPM | BODY MASS INDEX: 41.75 KG/M2 | DIASTOLIC BLOOD PRESSURE: 65 MMHG | OXYGEN SATURATION: 97 %

## 2020-04-06 DIAGNOSIS — Z95.2 S/P AVR: ICD-10-CM

## 2020-04-06 DIAGNOSIS — Z95.810 PRESENCE OF IMPLANTABLE CARDIOVERTER-DEFIBRILLATOR (ICD): ICD-10-CM

## 2020-04-06 DIAGNOSIS — I10 ESSENTIAL HYPERTENSION: ICD-10-CM

## 2020-04-06 DIAGNOSIS — I82.591 CHRONIC DEEP VEIN THROMBOSIS (DVT) OF OTHER VEIN OF RIGHT LOWER EXTREMITY (HCC): Primary | ICD-10-CM

## 2020-04-06 DIAGNOSIS — I48.0 PAF (PAROXYSMAL ATRIAL FIBRILLATION) (HCC): ICD-10-CM

## 2020-04-06 DIAGNOSIS — E66.01 MORBID OBESITY (HCC): ICD-10-CM

## 2020-04-06 PROCEDURE — 99214 OFFICE O/P EST MOD 30 MIN: CPT | Performed by: INTERNAL MEDICINE

## 2020-04-15 ENCOUNTER — REMOTE DEVICE CLINIC VISIT (OUTPATIENT)
Dept: CARDIOLOGY CLINIC | Facility: CLINIC | Age: 85
End: 2020-04-15
Payer: MEDICARE

## 2020-04-15 DIAGNOSIS — Z95.810 AICD (AUTOMATIC CARDIOVERTER/DEFIBRILLATOR) PRESENT: Primary | ICD-10-CM

## 2020-04-15 PROCEDURE — 93296 REM INTERROG EVL PM/IDS: CPT | Performed by: INTERNAL MEDICINE

## 2020-04-15 PROCEDURE — 93295 DEV INTERROG REMOTE 1/2/MLT: CPT | Performed by: INTERNAL MEDICINE

## 2020-06-05 DIAGNOSIS — I10 ESSENTIAL HYPERTENSION: ICD-10-CM

## 2020-06-08 RX ORDER — CANDESARTAN 16 MG/1
TABLET ORAL
Qty: 90 TABLET | Refills: 3 | OUTPATIENT
Start: 2020-06-08

## 2020-06-29 ENCOUNTER — HOSPITAL ENCOUNTER (EMERGENCY)
Facility: HOSPITAL | Age: 85
Discharge: HOME | End: 2020-06-29
Attending: EMERGENCY MEDICINE | Admitting: EMERGENCY MEDICINE
Payer: MEDICARE

## 2020-06-29 ENCOUNTER — APPOINTMENT (EMERGENCY)
Dept: RADIOLOGY | Facility: HOSPITAL | Age: 85
End: 2020-06-29
Payer: MEDICARE

## 2020-06-29 ENCOUNTER — TELEPHONE (OUTPATIENT)
Dept: CARDIOLOGY CLINIC | Facility: CLINIC | Age: 85
End: 2020-06-29

## 2020-06-29 VITALS
RESPIRATION RATE: 20 BRPM | TEMPERATURE: 98.3 F | BODY MASS INDEX: 45.04 KG/M2 | DIASTOLIC BLOOD PRESSURE: 73 MMHG | WEIGHT: 315 LBS | SYSTOLIC BLOOD PRESSURE: 161 MMHG | HEART RATE: 58 BPM | OXYGEN SATURATION: 95 %

## 2020-06-29 DIAGNOSIS — R07.9 CHEST PAIN, UNSPECIFIED TYPE: Primary | ICD-10-CM

## 2020-06-29 LAB
ALBUMIN SERPL BCP-MCNC: 3.9 G/DL (ref 3.5–5)
ALP SERPL-CCNC: 65 U/L (ref 46–116)
ALT SERPL W P-5'-P-CCNC: 27 U/L (ref 12–78)
ANION GAP SERPL CALCULATED.3IONS-SCNC: 9 MMOL/L (ref 4–13)
AST SERPL W P-5'-P-CCNC: 22 U/L (ref 5–45)
ATRIAL RATE: 67 BPM
BASOPHILS # BLD AUTO: 0.02 THOUSANDS/ΜL (ref 0–0.1)
BASOPHILS NFR BLD AUTO: 0 % (ref 0–1)
BILIRUB SERPL-MCNC: 1.45 MG/DL (ref 0.2–1)
BUN SERPL-MCNC: 23 MG/DL (ref 5–25)
CALCIUM SERPL-MCNC: 8.8 MG/DL (ref 8.3–10.1)
CHLORIDE SERPL-SCNC: 105 MMOL/L (ref 100–108)
CO2 SERPL-SCNC: 26 MMOL/L (ref 21–32)
CREAT SERPL-MCNC: 1.22 MG/DL (ref 0.6–1.3)
EOSINOPHIL # BLD AUTO: 0.1 THOUSAND/ΜL (ref 0–0.61)
EOSINOPHIL NFR BLD AUTO: 2 % (ref 0–6)
ERYTHROCYTE [DISTWIDTH] IN BLOOD BY AUTOMATED COUNT: 13.9 % (ref 11.6–15.1)
GFR SERPL CREATININE-BSD FRML MDRD: 54 ML/MIN/1.73SQ M
GLUCOSE SERPL-MCNC: 119 MG/DL (ref 65–140)
HCT VFR BLD AUTO: 39.2 % (ref 36.5–49.3)
HGB BLD-MCNC: 12.8 G/DL (ref 12–17)
IMM GRANULOCYTES # BLD AUTO: 0.03 THOUSAND/UL (ref 0–0.2)
IMM GRANULOCYTES NFR BLD AUTO: 0 % (ref 0–2)
INR PPP: 1.53 (ref 0.84–1.19)
LYMPHOCYTES # BLD AUTO: 1.39 THOUSANDS/ΜL (ref 0.6–4.47)
LYMPHOCYTES NFR BLD AUTO: 21 % (ref 14–44)
MCH RBC QN AUTO: 30.8 PG (ref 26.8–34.3)
MCHC RBC AUTO-ENTMCNC: 32.7 G/DL (ref 31.4–37.4)
MCV RBC AUTO: 95 FL (ref 82–98)
MONOCYTES # BLD AUTO: 0.67 THOUSAND/ΜL (ref 0.17–1.22)
MONOCYTES NFR BLD AUTO: 10 % (ref 4–12)
NEUTROPHILS # BLD AUTO: 4.52 THOUSANDS/ΜL (ref 1.85–7.62)
NEUTS SEG NFR BLD AUTO: 67 % (ref 43–75)
NRBC BLD AUTO-RTO: 0 /100 WBCS
P AXIS: 51 DEGREES
PLATELET # BLD AUTO: 200 THOUSANDS/UL (ref 149–390)
PMV BLD AUTO: 9.4 FL (ref 8.9–12.7)
POTASSIUM SERPL-SCNC: 4.7 MMOL/L (ref 3.5–5.3)
PR INTERVAL: 308 MS
PROT SERPL-MCNC: 7.6 G/DL (ref 6.4–8.2)
PROTHROMBIN TIME: 17.6 SECONDS (ref 11.6–14.5)
QRS AXIS: -46 DEGREES
QRSD INTERVAL: 172 MS
QT INTERVAL: 462 MS
QTC INTERVAL: 477 MS
RBC # BLD AUTO: 4.15 MILLION/UL (ref 3.88–5.62)
SODIUM SERPL-SCNC: 140 MMOL/L (ref 136–145)
T WAVE AXIS: 86 DEGREES
TROPONIN I SERPL-MCNC: <0.02 NG/ML
VENTRICULAR RATE: 64 BPM
WBC # BLD AUTO: 6.73 THOUSAND/UL (ref 4.31–10.16)

## 2020-06-29 PROCEDURE — 84484 ASSAY OF TROPONIN QUANT: CPT | Performed by: EMERGENCY MEDICINE

## 2020-06-29 PROCEDURE — 85610 PROTHROMBIN TIME: CPT | Performed by: EMERGENCY MEDICINE

## 2020-06-29 PROCEDURE — 99283 EMERGENCY DEPT VISIT LOW MDM: CPT | Performed by: EMERGENCY MEDICINE

## 2020-06-29 PROCEDURE — 80053 COMPREHEN METABOLIC PANEL: CPT | Performed by: EMERGENCY MEDICINE

## 2020-06-29 PROCEDURE — 93010 ELECTROCARDIOGRAM REPORT: CPT | Performed by: INTERNAL MEDICINE

## 2020-06-29 PROCEDURE — 85025 COMPLETE CBC W/AUTO DIFF WBC: CPT | Performed by: EMERGENCY MEDICINE

## 2020-06-29 PROCEDURE — 99285 EMERGENCY DEPT VISIT HI MDM: CPT

## 2020-06-29 PROCEDURE — 36415 COLL VENOUS BLD VENIPUNCTURE: CPT | Performed by: EMERGENCY MEDICINE

## 2020-06-29 PROCEDURE — 93005 ELECTROCARDIOGRAM TRACING: CPT

## 2020-06-29 PROCEDURE — 71045 X-RAY EXAM CHEST 1 VIEW: CPT

## 2020-06-29 RX ORDER — LIDOCAINE 50 MG/G
1 PATCH TOPICAL ONCE
Status: DISCONTINUED | OUTPATIENT
Start: 2020-06-29 | End: 2020-06-29 | Stop reason: HOSPADM

## 2020-06-29 RX ORDER — ACETAMINOPHEN 325 MG/1
650 TABLET ORAL ONCE
Status: COMPLETED | OUTPATIENT
Start: 2020-06-29 | End: 2020-06-29

## 2020-06-29 RX ORDER — HYDROCHLOROTHIAZIDE 25 MG/1
25 TABLET ORAL DAILY
COMMUNITY
Start: 2020-06-05 | End: 2022-05-09 | Stop reason: HOSPADM

## 2020-06-29 RX ADMIN — ACETAMINOPHEN 650 MG: 325 TABLET, FILM COATED ORAL at 12:50

## 2020-06-29 RX ADMIN — LIDOCAINE 1 PATCH: 50 PATCH TOPICAL at 12:50

## 2020-06-29 NOTE — DISCHARGE INSTRUCTIONS
DIAGNOSIS; LEFT SIDED CHEST PAIN     - ACTIVITY AS TOLERATED     - FOR PAIN- CAN TRY OVER THE COUNTER TYLENOL 500 MG EVERY 4 HRS     - THE LIDOCAINE PATCH NEEDS TO BE REMOVED IN 12 HRS- CAN USE OVER THE COUNTER LIDOCAINE PATCHES TO AREA - CAN NOT GET WARM OR WET     - PLEASE RETURN TO  THE ER FOR ANY WORSENING PAIN/ ANY INCREASING DIFFICULTY BREATHING OR ANY NEW/ WORSENING/CONCERNING SYMPTOMS TO YOU

## 2020-06-29 NOTE — ED PROCEDURE NOTE
PROCEDURE  ECG 12 Lead Documentation Only  Date/Time: 6/29/2020 1:43 PM  Performed by: Shamika Owens MD  Authorized by: Shamika Owens MD     Indications / Diagnosis:  LEFT SIDED CP   ECG reviewed by me, the ED Provider: yes    Patient location:  ED  Previous ECG:     Previous ECG:  Compared to current    Comparison ECG info:  8/8/18- OTHERT THAN LAD- NO SIGN CHAGNES    Similarity:  No change    Comparison to cardiac monitor: Yes    Interpretation:     Interpretation: non-specific    Rate:     ECG rate:  64    ECG rate assessment: normal    Rhythm:     Rhythm: sinus rhythm    Ectopy:     Ectopy: none    QRS:     QRS axis:  Left    QRS intervals:   Wide  Conduction:     Conduction: abnormal      Abnormal conduction: complete LBBB and 1st degree    ST segments:     ST segments:  Normal  T waves:     T waves: inverted      Inverted:  I and aVL  Q waves:     Q waves:  III, aVF and V1  Other findings:     Other findings: poor R wave progression    Comments:      NO MILARBEVELINAA A/B/C BARTOLOME JOHNSON CRITERIA         Shaimka wOens MD  06/29/20 [id number]

## 2020-07-01 ENCOUNTER — TELEPHONE (OUTPATIENT)
Dept: DERMATOLOGY | Facility: CLINIC | Age: 85
End: 2020-07-01

## 2020-07-01 NOTE — TELEPHONE ENCOUNTER
Telephone call to patient  Left voice message to patient in regards scheduling a Consult virtual visit with Dr Trevor Torrez   Pt is to call to scheduled

## 2020-07-01 NOTE — ED PROVIDER NOTES
History  Chief Complaint   Patient presents with    Chest Pain     PT reports "what I thought was indigestion at 11pm last night continued through the night  called cardiologist and nurse asked that I go to ER for further evaluation"     81 yr male- since last night at 11 pm with approx 6 episodes of left sided sharp cp lasting minutes at a time-- with no other symptoms-- no sob/ no radiation/ no n/v- no diaphoresis- pain not worse upon deep breath- no rash in area- no injury -- no abd pain-gerd/dyspepsia/ progressive dysphagia/ melena- currently has no pain       History provided by:  Patient   used: No    Chest Pain   Associated symptoms: no palpitations        Prior to Admission Medications   Prescriptions Last Dose Informant Patient Reported? Taking?    B Complex-C (SUPER B COMPLEX PO) 6/29/2020 at Unknown time Self Yes Yes   Sig: Take 1 capsule by mouth daily    Iron Combinations (NIFEREX) TABS Not Taking at Unknown time Self Yes No   Sig: Take one tablet daily   Multiple Vitamin (MULTIVITAMINS PO)  Self Yes Yes   Sig: Take 1 tablet by mouth daily   acetaminophen (TYLENOL) 325 mg tablet  Self No Yes   Sig: Take 2 tablets by mouth every 6 (six) hours as needed for mild pain or fever   amoxicillin (AMOXIL) 500 mg capsule  Self Yes Yes   Sig: TK 4 CS BEFORE 2 HOURS BEFORE DENTAL OR FACIAL SURGERY WORK   atorvastatin (LIPITOR) 40 mg tablet 6/28/2020 at Unknown time Self Yes Yes   Sig: Take 40 mg by mouth daily after dinner Atorvastatin Calcium 40 MG Oral Tablet Take 1 tablet daily  Refills: 0  Active    candesartan (ATACAND) 16 mg tablet  Self No Yes   Sig: Take 1 tablet (16 mg total) by mouth daily   cholecalciferol (VITAMIN D3) 1,000 units tablet  Self Yes Yes   Sig: Take 2,000 Units by mouth daily   hydrochlorothiazide (HYDRODIURIL) 25 mg tablet  Self Yes Yes   Sig: Take 25 mg by mouth daily    meloxicam (MOBIC) 15 mg tablet  Self Yes Yes   Sig: Take by mouth   metFORMIN (GLUCOPHAGE) 1000 MG tablet  Self Yes Yes   Sig: Take 1,000 mg by mouth daily after dinner MetFORMIN HCl 1000 MG (MOD) TB24 Take one tablet daily  Refills: 0  Active    mupirocin (BACTROBAN) 2 % ointment  Self Yes No   Sig: ANUSHA SPARINGLY EXT AA BID   omeprazole (PriLOSEC) 20 mg delayed release capsule  Self Yes Yes   Sig: Omeprazole 20 MG Oral Tablet Delayed Release Take 1 tablet daily  Refills: 0  Active   potassium chloride (K-DUR,KLOR-CON) 20 mEq tablet  Self Yes Yes   Sig: Take 20 mEq by mouth daily after dinner Potassium Chloride Jesusita ER 20 MEQ Oral Tablet Extended Release Take 1 tablet daily  Refills: 0  Active    sotalol (BETAPACE) 80 mg tablet  Self Yes Yes   Sig: Take 80 mg by mouth every 12 (twelve) hours    torsemide (DEMADEX) 20 mg tablet Not Taking at Unknown time Self No No   Sig: TAKE 1 TABLET(20 MG) BY MOUTH TWICE DAILY   Patient not taking: Reported on 6/29/2020   warfarin (COUMADIN) 4 mg tablet  Self No Yes   Sig: Take 1 tablet (4 mg total) by mouth 2 (two) times a week Patient take 4mg everyday but Tuesday and Friday     Patient taking differently: Take 4 mg by mouth 2 (two) times a week 4 or 6 mg as directed   warfarin (COUMADIN) 6 mg tablet  Self Yes Yes   Sig: Take 6 mg by mouth The patient takes on Tuesday and Fridays      Facility-Administered Medications: None       Past Medical History:   Diagnosis Date    Anemia     Arthritis     [facility]     [facility]     Diabetes mellitus ([facility]     Niddm    DVT (deep vein thrombosis) in pregnancy 1966    not in pregnancy    DVT (deep venous thrombosis) ([facility] ) 1966    Dyslipidemia     GERD (gastroesophageal reflux disease)     Hyperlipidemia     Hypertension     Irregular heart beat     Afib    Morbid obesity due to excess calories ([facility] )     Resolved 9/2/2014     Pulmonary embolism ([facility]     Sepsis ([facility]     Visual impairment        Past Surgical History:   Procedure Laterality Date    AORTIC VALVE REPLACEMENT      CARDIAC DEFIBRILLATOR PLACEMENT  04/2014    CARDIAC SURGERY  02/2014    AVR    COLONOSCOPY      INSERT / REPLACE / REMOVE PACEMAKER      JOINT REPLACEMENT Left     LTKR    FL LIGATE/STRIP LONG SAPH VEIN BELW SEP-FEM JUNC Right 8/17/2018    Procedure: LEG PERFORATED INJECTION SCLEROTHERAPY;  Surgeon: Kamari Bolton MD;  [facility]: [facility];  Service: Vascular    TOTAL KNEE ARTHROPLASTY Left     VASCULAR SURGERY      VENA CAVA FILTER PLACEMENT      Interruption inferior vena cava, [place] filter, placement    WISDOM TOOTH EXTRACTION         Family History   Problem Relation Age of Onset    Arthritis Mother     Stroke Mother     Arthritis Father     Cancer Father     Arthritis Daughter      I have reviewed and agree with the history as documented  E-Cigarette/Vaping    E-Cigarette Use Never User      E-Cigarette/Vaping Substances     Social History     Tobacco Use    Smoking status: Never Smoker    Smokeless tobacco: Never Used   Substance Use Topics    Alcohol use: Not Currently    Drug use: No       Review of Systems   Constitutional: Negative  HENT: Negative  Eyes: Negative  Respiratory: Negative  Cardiovascular: Positive for chest pain  Negative for palpitations and leg swelling  Gastrointestinal: Negative  Endocrine: Negative  Genitourinary: Negative  Musculoskeletal: Negative  Skin: Negative  Allergic/Immunologic: Negative  Neurological: Negative  Hematological: Negative  Psychiatric/Behavioral: Negative  Physical Exam  Physical Exam   Constitutional: He is oriented to person, place, and time  He appears well-developed and well-nourished  Non-toxic appearance  He does not appear ill  No distress  avss-- pulse ox 95-97 % on ra- interpretation is normal- no intervention - well appearing- in nad    HENT:   Head: Normocephalic and atraumatic  Eyes: Pupils are equal, round, and reactive to light   EOM are normal    Mm pink   Neck: Normal range of motion  Neck supple  No hepatojugular reflux and no JVD present  No tracheal deviation present  No thyromegaly present  No pmt c/t/l/s spine   Cardiovascular: Normal rate, regular rhythm and intact distal pulses  No extrasystoles are present  PMI is not displaced  Exam reveals no gallop, no S3, no S4, no distant heart sounds and no friction rub  No murmur heard  No systolic murmur is present  No diastolic murmur is present  Pulses:       Radial pulses are 3+ on the right side, and 3+ on the left side  Dorsalis pedis pulses are 3+ on the right side, and 3+ on the left side  Pulmonary/Chest: Effort normal and breath sounds normal  No accessory muscle usage or stridor  No tachypnea  No respiratory distress  He has no decreased breath sounds  He has no wheezes  He has no rhonchi  He has no rales  Pos left lower anterior chest wall tenderness to palpation under left nippel area   Abdominal: Soft  Bowel sounds are normal  He exhibits no distension, no ascites and no mass  There is no splenomegaly or hepatomegaly  There is no tenderness  There is no rebound and no guarding  Soft nt/nd- no hsm/ no cva tenderness/ no peritoneal signs- no pulsatile abd mass/bruit/ tenderness   Musculoskeletal: Normal range of motion  Right lower leg: Normal  He exhibits edema  He exhibits no tenderness  Left lower leg: Normal  He exhibits edema  He exhibits no tenderness  Trace ble pretibial edema- nt- no asym/ erythema- equal bilateral radial/dp pulses   Lymphadenopathy:     He has no cervical adenopathy  Neurological: He is alert and oriented to person, place, and time  He is not disoriented  No cranial nerve deficit  Normal non focal neuro exam    Skin: Skin is warm  Capillary refill takes less than 2 seconds  No abrasion, no ecchymosis and no rash noted  He is not diaphoretic  No cyanosis or erythema  There is pallor  Nails show no clubbing     Psychiatric: He has a normal mood and affect  His behavior is normal  His mood appears not anxious  He is not agitated  Nursing note and vitals reviewed        Vital Signs  ED Triage Vitals [06/29/20 1148]   Temperature Pulse Respirations Blood Pressure SpO2   98 3 °F (36 8 °C) 69 20 (!) 223/86 97 %      Temp Source Heart Rate Source Patient Position - Orthostatic VS BP Location FiO2 (%)   Oral Monitor Sitting Left arm --      Pain Score       Worst Possible Pain           Vitals:    06/29/20 1245 06/29/20 1315 06/29/20 1345 06/29/20 1415   BP: (!) 176/82 161/75  161/73   Pulse: 70 62 58    Patient Position - Orthostatic VS:             Visual Acuity      ED Medications  Medications   acetaminophen (TYLENOL) tablet 650 mg (650 mg Oral Given 6/29/20 1250)       Diagnostic Studies  Results Reviewed     Procedure Component Value Units Date/Time    Troponin I [[id number]]  (Normal) Collected:  06/29/20 1304    Lab Status:  Final result Specimen:  Blood from Arm, Left Updated:  06/29/20 1337     Troponin I <0 02 ng/mL     Comprehensive metabolic panel [[id number]]  (Abnormal) Collected:  06/29/20 1304    Lab Status:  Final result Specimen:  Blood from Arm, Left Updated:  06/29/20 1336     Sodium 140 mmol/L      Potassium 4 7 mmol/L      Chloride 105 mmol/L      CO2 26 mmol/L      ANION GAP 9 mmol/L      BUN 23 mg/dL      Creatinine 1 22 mg/dL      Glucose 119 mg/dL      Calcium 8 8 mg/dL      AST 22 U/L      ALT 27 U/L      Alkaline Phosphatase 65 U/L      Total Protein 7 6 g/dL      Albumin 3 9 g/dL      Total Bilirubin 1 45 mg/dL      eGFR 54 ml/min/1 73sq m     Narrative:       Vandana guidelines for Chronic Kidney Disease (CKD):     Stage 1 with normal or high GFR (GFR > 90 mL/min/1 73 square meters)    Stage 2 Mild CKD (GFR = 60-89 mL/min/1 73 square meters)    Stage 3A Moderate CKD (GFR = 45-59 mL/min/1 73 square meters)    Stage 3B Moderate CKD (GFR = 30-44 mL/min/1 73 square meters)    Stage 4 Severe CKD (GFR = 15-29 mL/min/1 73 square meters)    Stage 5 End Stage CKD (GFR <15 mL/min/1 73 square meters)  Note: GFR calculation is accurate only with a steady state creatinine    Protime-INR [[id number]]  (Abnormal) Collected:  06/29/20 1304    Lab Status:  Final result Specimen:  Blood from Arm, Left Updated:  06/29/20 1328     Protime 17 6 seconds      INR 1 53    CBC and differential [[id number]] Collected:  06/29/20 1304    Lab Status:  Final result Specimen:  Blood from Arm, Left Updated:  06/29/20 1319     WBC 6 73 Thousand/uL      RBC 4 15 Million/uL      Hemoglobin 12 8 g/dL      Hematocrit 39 2 %      MCV 95 fL      MCH 30 8 pg      MCHC 32 7 g/dL      RDW 13 9 %      MPV 9 4 fL      Platelets 455 Thousands/uL      nRBC 0 /100 WBCs      Neutrophils Relative 67 %      Immat GRANS % 0 %      Lymphocytes Relative 21 %      Monocytes Relative 10 %      Eosinophils Relative 2 %      Basophils Relative 0 %      Neutrophils Absolute 4 52 Thousands/µL      Immature Grans Absolute 0 03 Thousand/uL      Lymphocytes Absolute 1 39 Thousands/µL      Monocytes Absolute 0 67 Thousand/µL      Eosinophils Absolute 0 10 Thousand/µL      Basophils Absolute 0 02 Thousands/µL                  XR chest 1 view portable   ED Interpretation by Lorraine Guerrero MD (06/29 1340)   NAD      Final Result by Charlee Schwab MD (06/29 8903)      No acute cardiopulmonary disease              Workstation performed: [id number]                    Procedures  Procedures         ED Course  ED Course as of Jul 01 1305 Mon Jun 29, 2020   1234 Er md note- pt took normal am meds today       1234 Er md note- normal / equal bue pulse ox pleth 96-97 % on ra      1340 CXR PORTABLE-  COMPARED TO PREVIOUS FROM 2014-- NO SIGN CHANGES-  STERNOTOMY/ DUAL LEAD PACEMAKER-- CARDIOMEGALY- NO FREE/SQ AIR- NO INFILTRATE/ PTX/ PULM EDEMA/ PLEURAL EFFUSIONS      1346 - ER MD NOTE- PT IS HOLDING COUMADIN FOR KNEE PROCEDURE THIS WEEK       1347 ER MD MEDICAL DECISION MAKING NOTE-- BASED ON H AND P- NATURE OF PAIN -- ER MD SUSPICION OF CARDIAC /PULMONARY / VASCULAR CAUSE OF CP IS LOW-- WILL D/C-           US AUDIT      Most Recent Value   Initial Alcohol Screen: US AUDIT-C    1  How often do you have a drink containing alcohol?  0 Filed at: 06/29/2020 1248   2  How many drinks containing alcohol do you have on a typical day you are drinking? 0 Filed at: 06/29/2020 1248   3b  FEMALE Any Age, or MALE 65+: How often do you have 4 or more drinks on one occassion? 0 Filed at: 06/29/2020 1248   Audit-C Score  0 Filed at: 06/29/2020 1248                  KRISTIAN/DAST-10      Most Recent Value   How many times in the past year have you    Used an illegal drug or used a prescription medication for non-medical reasons? Never Filed at: 06/29/2020 1248                                MDM      Disposition  Final diagnoses:   Chest pain, unspecified type     Time reflects when diagnosis was documented in both MDM as applicable and the Disposition within this note     Time User Action Codes Description Comment    6/29/2020  1:48 PM Faisal Ibarra Add [R07 9] Chest pain, unspecified type       ED Disposition     ED Disposition Condition Date/Time Comment    Discharge Stable Mon Jun 29, 2020 1348 Neema Deleon discharge to home/self care              Follow-up Information    None         Discharge Medication List as of 6/29/2020  1:50 PM      CONTINUE these medications which have NOT CHANGED    Details   acetaminophen (TYLENOL) 325 mg tablet Take 2 tablets by mouth every 6 (six) hours as needed for mild pain or fever, Starting Wed 12/28/2016, Print      amoxicillin (AMOXIL) 500 mg capsule TK 4 CS BEFORE 2 HOURS BEFORE DENTAL OR FACIAL SURGERY WORK, Historical Med      atorvastatin (LIPITOR) 40 mg tablet Take 40 mg by mouth daily after dinner Atorvastatin Calcium 40 MG Oral Tablet Take 1 tablet daily  Refills: 0  Active , Historical Med      B Complex-C (SUPER B COMPLEX PO) Take 1 capsule by mouth daily , Historical Med      candesartan (ATACAND) 16 mg tablet Take 1 tablet (16 mg total) by mouth daily, Starting Mon 7/22/2019, Normal      cholecalciferol (VITAMIN D3) 1,000 units tablet Take 2,000 Units by mouth daily, Historical Med      hydrochlorothiazide (HYDRODIURIL) 25 mg tablet Take 25 mg by mouth daily , Starting Fri 6/5/2020, Historical Med      meloxicam (MOBIC) 15 mg tablet Take by mouth, Historical Med      metFORMIN (GLUCOPHAGE) 1000 MG tablet Take 1,000 mg by mouth daily after dinner MetFORMIN HCl 1000 MG (MOD) TB24 Take one tablet daily  Refills: 0  Active , Historical Med      Multiple Vitamin (MULTIVITAMINS PO) Take 1 tablet by mouth daily, Historical Med      omeprazole (PriLOSEC) 20 mg delayed release capsule Omeprazole 20 MG Oral Tablet Delayed Release Take 1 tablet daily  Refills: 0  Active, Historical Med      potassium chloride (K-DUR,KLOR-CON) 20 mEq tablet Take 20 mEq by mouth daily after dinner Potassium Chloride Jesusita ER 20 MEQ Oral Tablet Extended Release Take 1 tablet daily  Refills: 0  Active , Historical Med      sotalol (BETAPACE) 80 mg tablet Take 80 mg by mouth every 12 (twelve) hours , Starting Sat 7/28/2018, Historical Med      !! warfarin (COUMADIN) 4 mg tablet Take 1 tablet (4 mg total) by mouth 2 (two) times a week Patient take 4mg everyday but Tuesday and Friday , Starting Thu 8/1/2019, Normal      !! warfarin (COUMADIN) 6 mg tablet Take 6 mg by mouth The patient takes on Tuesday and Fridays, Historical Med      Iron Combinations (NIFEREX) TABS Take one tablet daily, Historical Med      mupirocin (BACTROBAN) 2 % ointment ANUSHA SPARINGLY EXT AA BID, Historical Med      torsemide (DEMADEX) 20 mg tablet TAKE 1 TABLET(20 MG) BY MOUTH TWICE DAILY, Normal       !! - Potential duplicate medications found  Please discuss with provider  No discharge procedures on file      PDMP Review     None          ED Provider  Electronically Signed by           Yossi Brady MD  07/01/20 [address]

## 2020-07-13 DIAGNOSIS — I10 ESSENTIAL HYPERTENSION: ICD-10-CM

## 2020-07-13 RX ORDER — CANDESARTAN 16 MG/1
TABLET ORAL
Qty: 90 TABLET | Refills: 3 | OUTPATIENT
Start: 2020-07-13

## 2020-07-14 ENCOUNTER — TELEPHONE (OUTPATIENT)
Dept: DERMATOLOGY | Facility: CLINIC | Age: 85
End: 2020-07-14

## 2020-07-15 ENCOUNTER — TELEMEDICINE (OUTPATIENT)
Dept: DERMATOLOGY | Facility: CLINIC | Age: 85
End: 2020-07-15
Payer: MEDICARE

## 2020-07-15 DIAGNOSIS — C44.42 SCC (SQUAMOUS CELL CARCINOMA), SCALP/NECK: Primary | ICD-10-CM

## 2020-07-15 PROCEDURE — 99204 OFFICE O/P NEW MOD 45 MIN: CPT | Performed by: DERMATOLOGY

## 2020-07-15 NOTE — PATIENT INSTRUCTIONS
Assessment and Plan:  Based on a thorough discussion of this condition and the management approach to it (including a comprehensive discussion of the known risks, side effects and potential benefits of treatment), the patient (family) agrees to implement the following specific plan:   [facility], scheduled with Dr Shay De La Cruz 7/30/2020    What is cutaneous squamous cell carcinoma? Cutaneous squamous cell carcinoma (SCC) is a common type of keratinocyte or non-melanoma skin cancer  It is derived from cells within the epidermis that make keratin -- the horny protein that makes up skin, hair and nails  Cutaneous SCC is an invasive disease, referring to cancer cells that have grown beyond the epidermis  SCC can sometimes metastasise and may prove fatal   Intraepidermal carcinoma (cutaneous SCC in situ) and mucosal SCC are considered elsewhere  Who gets cutaneous squamous cell carcinoma? Risk factors for cutaneous SCC include:   Age and sex: SCCs are particularly prevalent in elderly males  However, they also affect females and younger adults   Previous SCC or another form of skin cancer (basal cell carcinoma, melanoma) are a strong predictor for further skin cancers   Actinic keratoses    Outdoor occupation or recreation    Smoking    Fair skin, blue eyes and blond or red hair    Previous cutaneous injury, thermal burn, disease (eg cutaneous lupus, epidermolysis bullosa, leg ulcer)    Inherited syndromes: SCC is a particular problem for families with xeroderma pigmentosum and albinism    Other risk factors include ionising radiation, exposure to arsenic, and immune suppression due to disease (eg chronic lymphocytic leukaemia) or medicines  Organ transplant recipients have a massively increased risk of developing SCC     What causes cutaneous squamous cell carcinoma? More than 90% of cases of SCC are associated with numerous DNA mutations in multiple somatic genes   Mutations in the p53 tumour suppressor gene are caused by exposure to ultraviolet radiation (UV), especially UVB (known as signature 7)  Other signature mutations relate to cigarette smoking, ageing and immune suppression (eg, to drugs such as azathioprine)  Mutations in signalling pathways affect the epidermal growth factor receptor, VERÓNICA, Fyn, and [id number] signalling  Beta-genus human papillomaviruses (wart virus) are thought to play a role in SCC arising in immune-suppressed populations  ?-HPV and HPV subtypes 5, 8, 17, 20, 24, and 38 have also been associated with an increased risk of cutaneous SCC in immunocompetent individuals  What are the clinical features of cutaneous squamous cell carcinoma? Cutaneous SCCs present as enlarging scaly or crusted lumps  They usually arise within pre-existing actinic keratosis or intraepidermal carcinoma   They grow over weeks to months    They may ulcerate    They are often tender or painful    Located on sun-exposed sites, particularly the face, lips, ears, hands, forearms and lower legs    Size varies from a few millimetres to several centimetres in diameter  Types of cutaneous squamous cell carcinoma  Distinct clinical types of invasive cutaneous SCC include:   Cutaneous horn -- the horn is due to excessive production of keratin    Keratoacanthoma (KA) -- a rapidly growing keratinising nodule that may resolve without treatment    Carcinoma cuniculatum (verrucous carcinoma), a slow-growing, warty tumour on the sole of the foot   Multiple eruptive SCC/KA-like lesions arising in syndromes, such as multiple self-healing squamous epitheliomas of Reid-Smith and Grzybowski syndrome  The pathologist may classify a tumour as well differentiated, moderately well differentiated, poorly differentiated or anaplastic cutaneous SCC  There are other variants      Classification of squamous cell carcinoma by risk  Cutaneous SCC is classified as low-risk or high-risk, depending on the chance of tumour recurrence and metastasis  Characteristics of high-risk SCC include:  High-risk cutaneous squamous cell carcinoma has the following characteristics:   Diameter greater than or equal to 2 cm    Location on the ear, vermilion of the lip, central face, hands, feet, genitalia    Arising in elderly or immune suppressed patient    Histological thickness greater than 2 mm, poorly differentiated histology, or with the invasion of the subcutaneous tissue, nerves and blood vessels  Metastatic SCC is found in regional lymph nodes (80%), lungs, liver, brain, bones and skin  Staging cutaneous squamous cell carcinoma  In 2011, the [address] [place] [address] Ave on Cancer (AJCC) published a new staging systemic for cutaneous SCC for the 7th Edition of the AJCC manual  This evaluates the dimensions of the original primary tumour (T) and its metastases to lymph nodes (N)  Tumour staging for cutaneous SCC  TX: Th Primary tumour cannot be assessed  T0: No evidence of a primary tumour  Tis: Carcinoma in situ  T1: Tumour ? 2cm without high-risk features  T2: Tumour ? 2cm; or; Tumour ? 2 cm with high-risk features  T3: Tumour with the invasion of maxilla, mandible, orbit or temporal bone  T4: Tumour with the invasion of axial or appendicular skeleton or perineural invasion of skull base    Harley staging for cutaneous SCC  NX: Regional lymph nodes cannot be assessed  N0: No regional lymph node metastasis  N1: Metastasis in one local lymph node ? 3cm  N2: Metastasis in one local lymph node ? 3cm; or; Metastasis in >1 local lymph node ? 6cm  N3: Metastasis in lymph node ? 6cm    How is squamous cell carcinoma diagnosed? Diagnosis of cutaneous SCC is based on clinical features  The diagnosis and histological subtype are confirmed pathologically by diagnostic biopsy or following excision  See squamous cell carcinoma - pathology    Patients with high-risk SCC may also undergo staging investigations to determine whether it has spread to lymph nodes or elsewhere  These may include:   Imaging using ultrasound scan, X-rays, CT scans, MRI scans    Lymph node or other tissue biopsies    What is the treatment for cutaneous squamous cell carcinoma? Cutaneous SCC is nearly always treated surgically  Most cases are excised with a 3-10 mm margin of normal tissue around a visible tumour  A flap or skin graft may be needed to repair the defect  Other methods of removal include:   Shave, curettage, and electrocautery for low-risk tumours on trunk and limbs    Aggressive cryotherapy for very small, thin, low-risk tumours    Mohs micrographic surgery for large facial lesions with indistinct margins or recurrent tumours    Radiotherapy for an inoperable tumour, patients unsuitable for surgery, or as adjuvant    What is the treatment for advanced or metastatic squamous cell carcinoma? Locally advanced primary, recurrent or metastatic SCC requires multidisciplinary consultation  Often a combination of treatments is used   Surgery    Radiotherapy    Cemiplimab    Experimental targeted therapy using epidermal growth factor receptor inhibitors    How can cutaneous squamous cell carcinoma be prevented? There is a great deal of evidence to show that very careful sun protection at any time of life reduces the number of SCCs  This is particularly important in ageing, sun-damaged, fair skin; in patients that are immune suppressed; and in those who already have actinic keratoses or previous SCC   Stay indoors or under the shade in the middle of the day    Wear covering clothing    Apply high protection factor SPF50+ broad-spectrum sunscreens generously to exposed skin if outdoors    Avoid indoor tanning (sun beds, solaria)    Oral nicotinamide (vitamin B3) in a dose of 500 mg twice daily may reduce the number and severity of SCCs in people at high risk    Patients with multiple squamous cell carcinomas may be prescribed an oral retinoid (acitretin or isotretinoin)  These reduce the number of tumours but have some nuisance side effects  What is the outlook for cutaneous squamous cell carcinoma? Most SCCs are cured by treatment  A cure is most likely if treatment is undertaken when the lesion is small  The risk of recurrence or disease-associated death is greater for tumours that are > 20 mm in diameter and/or > 2 mm in thickness at the time of surgical excision  About 50% of people at high risk of SCC develop a second one within 5 years of the first  They are also at increased risk of other skin cancers, especially melanoma  Regular self-skin examinations and long-term annual skin checks by an experienced health professional are recommended

## 2020-07-15 NOTE — PROGRESS NOTES
Virtual Regular Visit      Assessment/Plan:    Problem List Items Addressed This Visit     None           Reason for visit is   Chief Complaint   Patient presents with    Virtual Regular Visit      Encounter provider Myla Strickland MD    Provider located at [address]  [address] Juliane Markham 115[address] [phone]      Recent Visits  Date Type Provider Dept   07/14/20 Telephone [facility] [place]   Showing recent visits within past 7 days and meeting all other requirements     Future Appointments  No visits were found meeting these conditions  Showing future appointments within next 150 days and meeting all other requirements        The patient was identified by name and date of birth  Sonam Blackmon was informed that this is a telemedicine visit and that the visit is being conducted through [employer] and patient was informed that this is not a secure, HIPAA-complaint platform  He agrees to proceed     My office door was closed  The following individuals were in the room with me and the patient informed Maria Del Carmen Manners  He acknowledged consent and understanding of privacy and security of the video platform  The patient has agreed to participate and understands they can discontinue the visit at any time  Patient is aware this is a billable service  Subjective  Sonam Blackmon is a 81 y o  male see above note       HPI     Past Medical History:   Diagnosis Date    Anemia     Arthritis     Atrial fibrillation ([address] )     CH[facility]congestive [facility] failure[facility])     Diabetes mellitus ([address] )     Niddm    DVT (deep vein thrombosis) in pregnancy 1966    not in pregnancy    DVT (deep venous thrombosis) ([address] ) 1966    Dyslipidemia     GERD (gastroesophageal reflux disease)     Hyperlipidemia     Hypertension     Irregular heart beat     Afib    Morbid obesity due to excess calories ([address] )     Resolved 9/2/2014     Pulmonary embolism [facility]     Sepsis ([facility] )     Squamous cell skin cancer     Visual impairment        Past Surgical History:   Procedure Laterality Date    AORTIC VALVE REPLACEMENT      CARDIAC DEFIBRILLATOR PLACEMENT  04/2014   [facility] CARDIAC SURGERY  02/2014    AVR    COLONOSCOPY      INSERT / REPLACE / REMOVE PACEMAKER      JOINT REPLACEMENT Left     LTKR    MO LIGATE/STRIP LONG SAPH VEIN BELW SEP-FEM JUNC Right 8/17/2018    Procedure: LEG PERFORATED INJECTION SCLEROTHERAPY;  Surgeon: Bev Pettit MD;  [facility];  Service: Vascular    TOTAL KNEE ARTHROPLASTY Left     VASCULAR SURGERY      VENA CAVA FILTER PLACEMENT      Interruption inferior vena cava, Josue filter, placement    WISDOM TOOTH EXTRACTION         Current Outpatient Medications   Medication Sig Dispense Refill    acetaminophen (TYLENOL) 325 mg tablet Take 2 tablets by mouth every 6 (six) hours as needed for mild pain or fever 30 tablet 0    atorvastatin (LIPITOR) 40 mg tablet Take 40 mg by mouth daily after dinner Atorvastatin Calcium 40 MG Oral Tablet Take 1 tablet daily  Refills: 0  Active       B Complex-C (SUPER B COMPLEX PO) Take 1 capsule by mouth daily       candesartan (ATACAND) 16 mg tablet Take 1 tablet (16 mg total) by mouth daily 90 tablet 3    cholecalciferol (VITAMIN D3) 1,000 units tablet Take 2,000 Units by mouth daily      hydrochlorothiazide (HYDRODIURIL) 25 mg tablet Take 25 mg by mouth daily       Iron Combinations (NIFEREX) TABS Take one tablet daily  5    meloxicam (MOBIC) 15 mg tablet Take by mouth      metFORMIN (GLUCOPHAGE) 1000 MG tablet Take 1,000 mg by mouth daily after dinner MetFORMIN HCl 1000 MG (MOD) TB24 Take one tablet daily  Refills: 0  Active       Multiple Vitamin (MULTIVITAMINS PO) Take 1 tablet by mouth daily      mupirocin (BACTROBAN) 2 % ointment ANUSHA SPARINGLY EXT AA BID      omeprazole (PriLOSEC) 20 mg delayed release capsule Omeprazole 20 MG Oral Tablet Delayed Release Take 1 tablet daily  Refills: 0  Active      potassium chloride (K-DUR,KLOR-CON) 20 mEq tablet Take 20 mEq by mouth daily after dinner Potassium Chloride Jesusita ER 20 MEQ Oral Tablet Extended Release Take 1 tablet daily  Refills: 0  Active       sotalol (BETAPACE) 80 mg tablet Take 80 mg by mouth every 12 (twelve) hours   2    torsemide (DEMADEX) 20 mg tablet TAKE 1 TABLET(20 MG) BY MOUTH TWICE DAILY 180 tablet 0    warfarin (COUMADIN) 4 mg tablet Take 1 tablet (4 mg total) by mouth 2 (two) times a week Patient take 4mg everyday but Tuesday and Friday  (Patient taking differently: Take 4 mg by mouth 2 (two) times a week 4 or 6 mg as directed) 90 tablet 0    warfarin (COUMADIN) 6 mg tablet Take 6 mg by mouth The patient takes on Tuesday and Fridays      amoxicillin (AMOXIL) 500 mg capsule TK 4 CS BEFORE 2 HOURS BEFORE DENTAL OR FACIAL SURGERY WORK  3     No current facility-administered medications for this visit  No Known Allergies    Review of Systems    Video Exam    There were no vitals filed for this visit  Physical Exam     I spent 10 minutes directly with the patient during this visit      VIRTUAL VISIT [address] acknowledges that he has consented to an online visit or consultation  He understands that the online visit is based solely on information provided by him, and that, in the absence of a face-to-face physical evaluation by the physician, the diagnosis he receives is both limited and provisional in terms of accuracy and completeness  This is not intended to replace a full medical face-to-face evaluation by the physician  Christal Cosby understands and accepts these terms  [facility] V[facility]UAL Clinic Note     Patient Name: Christal Button  Encounter Date: 07/15/2020     Have you been cared for by a [facility] Dermatologist in the last 3 years and, if so, which one?   No    · Have you traveled outside of the [facility] in the past 3 months or outside of the [facility] area in the last 2 weeks? No     May we call your Preferred Phone number to discuss your specific medical information? Yes     May we leave a detailed message that includes your specific medical information? Yes      Today's Chief Concerns:   Concern #1:  MOHS consult    Past Medical History:  Have you personally ever had or currently have any of the following? · Skin cancer (such as Melanoma, Basal Cell Carcinoma, Squamous Cell Carcinoma? (If Yes, please provide more detail)- No  · Eczema: No  · Psoriasis: No  · HIV/AIDS: No  · Hepatitis B or C: No  · Tuberculosis: No  · Systemic Immunosuppression such as Diabetes, Biologic or Immunotherapy, Chemotherapy, Organ Transplantation, Bone Marrow Transplantation (If YES, please provide more detail): No  · Radiation Treatment (If YES, please provide more detail): No  · Any other major medical conditions/concerns? (If Yes, which types)- No    Social History:     What is/was your primary occupation? Retired      What are your hobbies/past-times? Self employed     Family History:  Have any of your "first degree relatives" (parent, brother, sister, or child) had any of the following       · Skin cancer such as Melanoma or Merkel Cell Carcinoma or Pancreatic Cancer? No  · Eczema, Asthma, Hay Fever or Seasonal Allergies: No  · Psoriasis or Psoriatic Arthritis: No  · Do any other medical conditions seem to run in your family? If Yes, what condition and which relatives?   No    Current Medications:       Current Outpatient Medications:     acetaminophen (TYLENOL) 325 mg tablet, Take 2 tablets by mouth every 6 (six) hours as needed for mild pain or fever, Disp: 30 tablet, Rfl: 0    atorvastatin (LIPITOR) 40 mg tablet, Take 40 mg by mouth daily after dinner Atorvastatin Calcium 40 MG Oral Tablet Take 1 tablet daily  Refills: 0  Active , Disp: , Rfl:     B Complex-C (SUPER B COMPLEX PO), Take 1 capsule by mouth daily , Disp: , Rfl:     candesartan (ATACAND) 16 mg tablet, Take 1 tablet (16 mg total) by mouth daily, Disp: 90 tablet, Rfl: 3    cholecalciferol (VITAMIN D3) 1,000 units tablet, Take 2,000 Units by mouth daily, Disp: , Rfl:     hydrochlorothiazide (HYDRODIURIL) 25 mg tablet, Take 25 mg by mouth daily , Disp: , Rfl:     Iron Combinations (NIFEREX) TABS, Take one tablet daily, Disp: , Rfl: 5    meloxicam (MOBIC) 15 mg tablet, Take by mouth, Disp: , Rfl:     metFORMIN (GLUCOPHAGE) 1000 MG tablet, Take 1,000 mg by mouth daily after dinner MetFORMIN HCl 1000 MG (MOD) TB24 Take one tablet daily  Refills: 0  Active , Disp: , Rfl:     Multiple Vitamin (MULTIVITAMINS PO), Take 1 tablet by mouth daily, Disp: , Rfl:     mupirocin (BACTROBAN) 2 % ointment, ANUSHA SPARINGLY EXT AA BID, Disp: , Rfl:     omeprazole (PriLOSEC) 20 mg delayed release capsule, Omeprazole 20 MG Oral Tablet Delayed Release Take 1 tablet daily  Refills: 0  Active, Disp: , Rfl:     potassium chloride (K-DUR,KLOR-CON) 20 mEq tablet, Take 20 mEq by mouth daily after dinner Potassium Chloride Jesusita ER 20 MEQ Oral Tablet Extended Release Take 1 tablet daily  Refills: 0  Active , Disp: , Rfl:     sotalol (BETAPACE) 80 mg tablet, Take 80 mg by mouth every 12 (twelve) hours , Disp: , Rfl: 2    torsemide (DEMADEX) 20 mg tablet, TAKE 1 TABLET(20 MG) BY MOUTH TWICE DAILY, Disp: 180 tablet, Rfl: 0    warfarin (COUMADIN) 4 mg tablet, Take 1 tablet (4 mg total) by mouth 2 (two) times a week Patient take 4mg everyday but Tuesday and Friday  (Patient taking differently: Take 4 mg by mouth 2 (two) times a week 4 or 6 mg as directed), Disp: 90 tablet, Rfl: 0    warfarin (COUMADIN) 6 mg tablet, Take 6 mg by mouth The patient takes on Tuesday and Fridays, Disp: , Rfl:     amoxicillin (AMOXIL) 500 mg capsule, TK 4 CS BEFORE 2 HOURS BEFORE DENTAL OR FACIAL SURGERY WORK, Disp: , Rfl: 3      Review of Systems:  Have you recently had or currently have any of the following?   If YES, what are you doing for the problem? · Fever, chills or unintended weight loss: No  · Sudden loss or change in your vision: No  · Nausea, vomiting or blood in your stool: No  · Painful or swollen joints: No  · Wheezing or cough: No  · Changing mole or non-healing wound: No  · Nosebleeds: No  · Excessive sweating: No  · Easy or prolonged bleeding? No  · Over the last 2 weeks, how often have you been bothered by the following problems? · Taking little interest or pleasure in doing things: 1 - Not at All  · Feeling down, depressed, or hopeless: 1 - Not at All  · Rapid heartbeat with epinephrine:  No    · FEMALES ONLY:    · Are you pregnant or planning to become pregnant? No  · Are you currently or planning to be nursing or breast feeding? N/A    · Any known allergies?      · No Known Allergies      Physical Exam:     Was a chaperone (Derm Clinical Assistant) present throughout the entire virtual Physical Exam? Yes     Did the Dermatology Team specifically  the patient on the technical and practical limitations of a virtual Physical Exam? Yes}  o Did the patient ultimately request or accept a virtual Physical Exam?  Yes  o Did the patient specifically refuse to have the areas "under-the-bra" examined by the Dermatologist? No  o Did the patient specifically refuse to have the areas "under-the-underwear" examined by the Dermatologist? No    CONSTITUTIONAL:   Appearance: alert, well appearing, and in no distress  PSYCH: Normal mood and affect  EYES: Normal appearing eyes with normal color; no obvious deformities  ENT: Normal ears, nose, lips and neck with normal color and no obvious deformities; no obvious difficulty swallowing  CARDIOVASCULAR: No obvious edema; no obvious jugular venous distension  RESPIRATORY: Normal appearing respirations; no obvious shortness of breath  HEME/LYMPH/IMMUNO:  Normal color without obvious pallor, jaundice, petechiae or bleeding; no obvious cervical chain masses    SKIN: FULL ORGAN SYSTEM EXAM  Hair, Scalp, Ears, Face Normal except as noted below in Assessment   Neck, Cervical Chain Nodes    Right Arm/Hand/Fingers    Left Arm/Hand/Fingers    Chest/Breasts/Axillae    Abdomen, Umbilicus    Back/Spine    Groin/Genitalia/Buttocks    Right Leg, Foot, Toes    Left Leg, Foot, Toes       SQUAMOUS CELL CARCINOMA    Physical Exam:   Anatomic Location Affected:  Left posterior scalp   Morphological Description:  1 1 cm ulcerated nodule   Pertinent Positives:   Pertinent Negatives: n/a    Additional History of Present Condition:  SCC proven    Assessment and Plan:  Based on a thorough discussion of this condition and the management approach to it (including a comprehensive discussion of the known risks, side effects and potential benefits of treatment), the patient (family) agrees to implement the following specific plan:   MOHS, scheduled with Dr Owen aCsper 7/30/2020    What is cutaneous squamous cell carcinoma? Cutaneous squamous cell carcinoma (SCC) is a common type of keratinocyte or non-melanoma skin cancer  It is derived from cells within the epidermis that make keratin -- the horny protein that makes up skin, hair and nails  Cutaneous SCC is an invasive disease, referring to cancer cells that have grown beyond the epidermis  SCC can sometimes metastasise and may prove fatal   Intraepidermal carcinoma (cutaneous SCC in situ) and mucosal SCC are considered elsewhere  Who gets cutaneous squamous cell carcinoma? Risk factors for cutaneous SCC include:   Age and sex: SCCs are particularly prevalent in elderly males  However, they also affect females and younger adults   Previous SCC or another form of skin cancer (basal cell carcinoma, melanoma) are a strong predictor for further skin cancers      Actinic keratoses    Outdoor occupation or recreation    Smoking    Fair skin, blue eyes and blond or red hair    Previous cutaneous injury, thermal burn, disease (eg cutaneous lupus, epidermolysis bullosa, leg ulcer)    Inherited syndromes: SCC is a particular problem for families with xeroderma pigmentosum and albinism    Other risk factors include ionising radiation, exposure to arsenic, and immune suppression due to disease (eg chronic lymphocytic leukaemia) or medicines  Organ transplant recipients have a massively increased risk of developing SCC     What causes cutaneous squamous cell carcinoma? More than 90% of cases of SCC are associated with numerous DNA mutations in multiple somatic genes  Mutations in the p53 tumour suppressor gene are caused by exposure to ultraviolet radiation (UV), especially UVB (known as signature 7)  Other signature mutations relate to cigarette smoking, ageing and immune suppression (eg, to drugs such as azathioprine)  Mutations in signalling pathways affect the epidermal growth factor receptor, VERÓNICA, Fyn, and [id number] signalling  Beta-genus human papillomaviruses (wart virus) are thought to play a role in SCC arising in immune-suppressed populations  ?-HPV and HPV subtypes 5, 8, 17, 20, 24, and 38 have also been associated with an increased risk of cutaneous SCC in immunocompetent individuals  What are the clinical features of cutaneous squamous cell carcinoma? Cutaneous SCCs present as enlarging scaly or crusted lumps  They usually arise within pre-existing actinic keratosis or intraepidermal carcinoma   They grow over weeks to months    They may ulcerate    They are often tender or painful    Located on sun-exposed sites, particularly the face, lips, ears, hands, forearms and lower legs    Size varies from a few millimetres to several centimetres in diameter      Types of cutaneous squamous cell carcinoma  Distinct clinical types of invasive cutaneous SCC include:   Cutaneous horn -- the horn is due to excessive production of keratin    Keratoacanthoma (KA) -- a rapidly growing keratinising nodule that may resolve without treatment  Carcinoma cuniculatum (verrucous carcinoma), a slow-growing, warty tumour on the sole of the foot   Multiple eruptive SCC/KA-like lesions arising in syndromes, such as multiple self-healing squamous epitheliomas of Reid-Smith and Grzybowski syndrome  The pathologist may classify a tumour as well differentiated, moderately well differentiated, poorly differentiated or anaplastic cutaneous SCC  There are other variants  Classification of squamous cell carcinoma by risk  Cutaneous SCC is classified as low-risk or high-risk, depending on the chance of tumour recurrence and metastasis  Characteristics of high-risk SCC include:  High-risk cutaneous squamous cell carcinoma has the following characteristics:   Diameter greater than or equal to 2 cm    Location on the ear, vermilion of the lip, central face, hands, feet, genitalia    Arising in elderly or immune suppressed patient    Histological thickness greater than 2 mm, poorly differentiated histology, or with the invasion of the subcutaneous tissue, nerves and blood vessels  Metastatic SCC is found in regional lymph nodes (80%), lungs, liver, brain, bones and skin  Staging cutaneous squamous cell carcinoma  In 2011, the [address] [place] [address] Av on Cancer (AJCC) published a new staging systemic for cutaneous SCC for the 7th Edition of the AJCC manual  This evaluates the dimensions of the original primary tumour (T) and its metastases to lymph nodes (N)  Tumour staging for cutaneous SCC  TX: Th Primary tumour cannot be assessed  T0: No evidence of a primary tumour  Tis: Carcinoma in situ  T1: Tumour ? 2cm without high-risk features  T2: Tumour ? 2cm; or; Tumour ?  2 cm with high-risk features  T3: Tumour with the invasion of maxilla, mandible, orbit or temporal bone  T4: Tumour with the invasion of axial or appendicular skeleton or perineural invasion of skull base    Harley staging for cutaneous SCC  NX: Regional lymph nodes cannot be assessed  N0: No regional lymph node metastasis  N1: Metastasis in one local lymph node ? 3cm  N2: Metastasis in one local lymph node ? 3cm; or; Metastasis in >1 local lymph node ? 6cm  N3: Metastasis in lymph node ? 6cm    How is squamous cell carcinoma diagnosed? Diagnosis of cutaneous SCC is based on clinical features  The diagnosis and histological subtype are confirmed pathologically by diagnostic biopsy or following excision  See squamous cell carcinoma - pathology  Patients with high-risk SCC may also undergo staging investigations to determine whether it has spread to lymph nodes or elsewhere  These may include:   Imaging using ultrasound scan, X-rays, CT scans, MRI scans    Lymph node or other tissue biopsies    What is the treatment for cutaneous squamous cell carcinoma? Cutaneous SCC is nearly always treated surgically  Most cases are excised with a 3-10 mm margin of normal tissue around a visible tumour  A flap or skin graft may be needed to repair the defect  Other methods of removal include:   Shave, curettage, and electrocautery for low-risk tumours on trunk and limbs    Aggressive cryotherapy for very small, thin, low-risk tumours    Mohs micrographic surgery for large facial lesions with indistinct margins or recurrent tumours    Radiotherapy for an inoperable tumour, patients unsuitable for surgery, or as adjuvant    What is the treatment for advanced or metastatic squamous cell carcinoma? Locally advanced primary, recurrent or metastatic SCC requires multidisciplinary consultation  Often a combination of treatments is used   Surgery    Radiotherapy    Cemiplimab    Experimental targeted therapy using epidermal growth factor receptor inhibitors    How can cutaneous squamous cell carcinoma be prevented? There is a great deal of evidence to show that very careful sun protection at any time of life reduces the number of SCCs   This is particularly important in ageing, sun-damaged, fair skin; in patients that are immune suppressed; and in those who already have actinic keratoses or previous SCC   Stay indoors or under the shade in the middle of the day    Wear covering clothing    Apply high protection factor SPF50+ broad-spectrum sunscreens generously to exposed skin if outdoors    Avoid indoor tanning (sun beds, solaria)    Oral nicotinamide (vitamin B3) in a dose of 500 mg twice daily may reduce the number and severity of SCCs in people at high risk  Patients with multiple squamous cell carcinomas may be prescribed an oral retinoid (acitretin or isotretinoin)  These reduce the number of tumours but have some nuisance side effects  What is the outlook for cutaneous squamous cell carcinoma? Most SCCs are cured by treatment  A cure is most likely if treatment is undertaken when the lesion is small  The risk of recurrence or disease-associated death is greater for tumours that are > 20 mm in diameter and/or > 2 mm in thickness at the time of surgical excision  About 50% of people at high risk of SCC develop a second one within 5 years of the first  They are also at increased risk of other skin cancers, especially melanoma  Regular self-skin examinations and long-term annual skin checks by an experienced health professional are recommended         Scribe Attestation    I,:   Kathryn Alaniz MA am acting as a scribe while in the presence of the attending physician :        I,:   Wyatt Denton MD personally performed the services described in this documentation    as scribed in my presence :

## 2020-07-30 ENCOUNTER — PROCEDURE VISIT (OUTPATIENT)
Dept: DERMATOLOGY | Facility: CLINIC | Age: 85
End: 2020-07-30
Payer: MEDICARE

## 2020-07-30 VITALS
TEMPERATURE: 97.5 F | RESPIRATION RATE: 16 BRPM | SYSTOLIC BLOOD PRESSURE: 119 MMHG | HEART RATE: 77 BPM | DIASTOLIC BLOOD PRESSURE: 72 MMHG | BODY MASS INDEX: 41.75 KG/M2 | HEIGHT: 73 IN | WEIGHT: 315 LBS

## 2020-07-30 DIAGNOSIS — C44.42 SCC (SQUAMOUS CELL CARCINOMA), SCALP/NECK: Primary | ICD-10-CM

## 2020-07-30 PROCEDURE — 12032 INTMD RPR S/A/T/EXT 2.6-7.5: CPT | Performed by: DERMATOLOGY

## 2020-07-30 PROCEDURE — 17311 MOHS 1 STAGE H/N/HF/G: CPT | Performed by: DERMATOLOGY

## 2020-07-30 NOTE — PROGRESS NOTES
MOHS Procedure Note    Patient: Michael Eugene  : 1935  MRN: [id number]  Date: 2020    History of Present Illness:  The patient is a 81 y o  male who presents with complaints of Squamous Cell Carcinoma left podterior scalp    Past Medical History:   Diagnosis Date    Anemia     Arthritis     Atrial fibrillation ([facility] )     [facility]) [facility]     Diabetes mellitus ([facility] )     Niddm    DVT (deep vein thrombosis) in pregnancy 1966    not in pregnancy    DVT (deep venous thrombosis) ([place] [address] ) 1966    Dyslipidemia     GERD (gastroesophageal reflux disease)     Hyperlipidemia     Hypertension     Irregular heart beat     Afib    Morbid obesity due to excess calories ([facility] )     Resolved 2014     Pulmonary embolism (HCC)     Sepsis ([place] [address] )     Squamous cell skin cancer 2020    Left posterior scalp    Visual impairment        Past Surgical History:   Procedure Laterality Date    AORTIC VALVE REPLACEMENT      CARDIAC DEFIBRILLATOR PLACEMENT  2014   Azeb Butler CARDIAC SURGERY  2014    AVR    COLONOSCOPY      INSERT / REPLACE / REMOVE PACEMAKER      JOINT REPLACEMENT Left     LTKR    MOHS SURGERY  2020    Left posterior scalp, Dr aDniel Field SEP-FEM [facility] Right 2018    Procedure: LEG PERFORATED INJECTION SCLEROTHERAPY;  Surgeon: Lurena Alpers, MD;  Location: [facility]  MAIN OR;  Service: Vascular    TOTAL KNEE ARTHROPLASTY Left     VASCULAR SURGERY      VENA CAVA FILTER PLACEMENT      Interruption inferior vena cava, [place] filter, placement    WISDOM TOOTH EXTRACTION           Current Outpatient Medications:     acetaminophen (TYLENOL) 325 mg tablet, Take 2 tablets by mouth every 6 (six) hours as needed for mild pain or fever, Disp: 30 tablet, Rfl: 0    amoxicillin (AMOXIL) 500 mg capsule, TK 4 CS BEFORE 2 HOURS BEFORE DENTAL OR FACIAL SURGERY WORK, Disp: , Rfl: 3    atorvastatin (LIPITOR) 40 mg tablet, Take 40 mg by mouth daily after dinner Atorvastatin Calcium 40 MG Oral Tablet Take 1 tablet daily  Refills: 0  Active , Disp: , Rfl:     B Complex-C (SUPER B COMPLEX PO), Take 1 capsule by mouth daily , Disp: , Rfl:     candesartan (ATACAND) 16 mg tablet, Take 1 tablet (16 mg total) by mouth daily, Disp: 90 tablet, Rfl: 3    cholecalciferol (VITAMIN D3) 1,000 units tablet, Take 2,000 Units by mouth daily, Disp: , Rfl:     hydrochlorothiazide (HYDRODIURIL) 25 mg tablet, Take 25 mg by mouth daily , Disp: , Rfl:     Iron Combinations (NIFEREX) TABS, Take one tablet daily, Disp: , Rfl: 5    meloxicam (MOBIC) 15 mg tablet, Take by mouth, Disp: , Rfl:     metFORMIN (GLUCOPHAGE) 1000 MG tablet, Take 1,000 mg by mouth daily after dinner MetFORMIN HCl 1000 MG (MOD) TB24 Take one tablet daily  Refills: 0  Active , Disp: , Rfl:     Multiple Vitamin (MULTIVITAMINS PO), Take 1 tablet by mouth daily, Disp: , Rfl:     mupirocin (BACTROBAN) 2 % ointment, ANUSHA SPARINGLY EXT AA BID, Disp: , Rfl:     omeprazole (PriLOSEC) 20 mg delayed release capsule, Omeprazole 20 MG Oral Tablet Delayed Release Take 1 tablet daily  Refills: 0  Active, Disp: , Rfl:     potassium chloride (K-DUR,KLOR-CON) 20 mEq tablet, Take 20 mEq by mouth daily after dinner Potassium Chloride Jesusita ER 20 MEQ Oral Tablet Extended Release Take 1 tablet daily  Refills: 0  Active , Disp: , Rfl:     sotalol (BETAPACE) 80 mg tablet, Take 80 mg by mouth every 12 (twelve) hours , Disp: , Rfl: 2    torsemide (DEMADEX) 20 mg tablet, TAKE 1 TABLET(20 MG) BY MOUTH TWICE DAILY, Disp: 180 tablet, Rfl: 0    warfarin (COUMADIN) 4 mg tablet, Take 1 tablet (4 mg total) by mouth 2 (two) times a week Patient take 4mg everyday but Tuesday and Friday   (Patient taking differently: Take 4 mg by mouth 2 (two) times a week 4 or 6 mg as directed), Disp: 90 tablet, Rfl: 0    warfarin (COUMADIN) 6 mg tablet, Take 6 mg by mouth The patient takes on Tuesday and Fridays, Disp: , Rfl:     No Known Allergies    Physical Exam:   Vitals:    07/30/20 0901   BP: 119/72   Pulse: 77   Resp: 16   Temp: 97 5 °F (36 4 °C)     General: Awake, Alert, Oriented x 3, Mood and affect appropriate  Respiratory: Respirations even and unlabored  Cardiovascular: Peripheral pulses intact; no edema  Musculoskeletal Exam: N/A    Assessment: 1 0cm x 1 5cm pink scar    Plan: MOHS     MOHS Procedure Timeout      Most Recent Value   Timeout:  0910   Patient Identity Verified:  Yes   Correct Site Verified:  Yes   Correct Procedure Verified:  Yes          MOHS Diagnosis/Indication/Location/ID      Most Recent Value   Anatomic Site  -- [Left posterior scalp]   Indications for MOHS  tumor location, large tumor size   MOHS ID  [id number]          MOHS Site/Accession/Pre-Post      Most Recent Value   Original Site Identified (as submitted by referring clinician)  Referral   Biopsy Accession/Specimen # (as submitted by referring clincian)  [id number]   Pre-MOHS Size Length (cm)  1   Pre-MOHS Size Width (cm)  1 5   Post-MOHS Size-Length (cm)  1 8   Post MOHS Size-Width (cm)  1 4   Repair Type  Intermediate layered closure   Suture Type  Vicryl, Ethilon   Ethilon Suture Size  4   Vicryl Suture Size  4   Final repair length (cm):  4   Anesthetic Used  1% Lidocaine with epinephrine          MOHS Tumor Stage 1 Information      Most Recent Value   Tissue Sections (blocks)  2   Microscopic Exam Section 2:  No tumor identified in section  Tumor Clear After Stage I? Yes                      Patient identified, procedure verified, site identified and verified  Time out completed  Surgical removal of the lesion discussed with the patient (risks and benefits, including possibility of scarring, infection, recurrence or potential for further treatment)  I have specifically identified the site with the patient  I have discussed the fact that the patient will have a scar after the procedure regardless of granulation or repair with sutures   I have discussed that the repair options can range from granulation in some cases to linear or curvilinear closures to larger flaps or grafts  There are sometimes flaps or grafts used that require multiples stages of surgery and will not be completed today, rather be completed over a series of appointments  I have discussed that occasionally due to location, size or depth of the lesion I may recommend consultation with and transfer of care for further removal or the reconstruction to another provider such as ophthalmology surgery, plastic surgery, ENT surgery, or surgical oncology  There are cases in which other testing such as imaging with MRI or CT scan or testing of lymph nodes is recommended because of the nature/depth/location of tumor seen during the removal  There is a risk of injury to nerves causing temporary or permanent numbness or the inability to move muscles full such as the inability to lift eyebrows  Questions answered and verbal and written consent was obtained  The tumor qualifies for Mohs based on AUC criteria  Dr Rush Paulson served as the surgeon and pathologist during the procedure  Excisions with Simple and Intermediate Closures  The surgical procedure, complications, side effects including scarring, and alternative options were discussed  Informed consent was obtained  The patient was placed in the supine position on the operating table  The left posterior scalp was prepped with chlorhexadine and draped in the usual sterile fashion  1% Lidocaine with Epinephrine was used for local anesthesia  Elliptical fashion through the subcutaneous tissue  To achieve closure without tension, the wound was undermined sharply in the subcutis  Bleeding points were cauterized  The wound was closed in layers buried 4-0 vicryl sutures  Superficial closure was achieved utilizing and 4-0 prolene  Final wound length: 4 cm  The wound was dressed with mupirocin ointment and pressure dressing  There were no complications  Patient tolerated the procedure well  Wound care instructions given  Suture removal in 14 days  SCC cleared with 1 stage of mohs and repaired with 4cm closure  Well tolerated  COVID precautions taken  S/R in 2 weeks      Scribe Attestation    I,:   Remi Wynn MA am acting as a scribe while in the presence of the attending physician :        I,:   Gary Hopson MD personally performed the services described in this documentation    as scribed in my presence :

## 2020-07-30 NOTE — PATIENT INSTRUCTIONS
Mohs Microscopic Surgery After Care    What You Will Need to Do After the Procedure  1  Keep the area clean and dry the first day  Try NOT to remove the bandage for the first day  2  Gently clean the area with soap and water and apply Vaseline ointment (this is over the counter and not a prescription) to the excision site for up to 2 weeks  3  Apply a clean appropriately sized bandage to area  Gauze and paper tape are recommended for sensitive skin  4  Return for suture removal as instructed (generally 1 week for the face, 2 weeks for the body)  5  Take Acetaminophen (Tylenol) for discomfort, if no contraindications  Do NOT take Ibuprofen or aspirin unless specifically told to do so by your Dermatologist because these medications can make bleeding worse  6  Call our office immediately for signs of infection: fever, chills, increased redness, warmth, tenderness, discomfort/pain, or pus or foul smell coming from the wound  If bleeding is noticed, place a clean cloth over the area and apply firm pressure for thirty minutes  Check the wound ONLY after 30 minutes of direct pressure; do not cheat and sneak a peak, as that does not count  If bleeding persists after 30 minutes of legitimate direct pressure, then try one more round of direct pressure for an additional 10 minutes to the area  Should the bleeding become heavier or not stop after the second attempt, call [facility] directly at [phone] (SKIN) or, if after hours, go to your nearest Emergency Room or Urgent Care

## 2020-08-13 ENCOUNTER — OFFICE VISIT (OUTPATIENT)
Dept: DERMATOLOGY | Facility: CLINIC | Age: 85
End: 2020-08-13

## 2020-08-13 DIAGNOSIS — C44.42 SCC (SQUAMOUS CELL CARCINOMA), SCALP/NECK: Primary | ICD-10-CM

## 2020-08-13 PROCEDURE — 3078F DIAST BP <80 MM HG: CPT | Performed by: DERMATOLOGY

## 2020-08-13 PROCEDURE — RECHECK: Performed by: DERMATOLOGY

## 2020-08-13 PROCEDURE — 3066F NEPHROPATHY DOC TX: CPT | Performed by: DERMATOLOGY

## 2020-08-13 PROCEDURE — 4040F PNEUMOC VAC/ADMIN/RCVD: CPT | Performed by: DERMATOLOGY

## 2020-08-13 PROCEDURE — 3074F SYST BP LT 130 MM HG: CPT | Performed by: DERMATOLOGY

## 2020-08-13 PROCEDURE — 2022F DILAT RTA XM EVC RTNOPTHY: CPT | Performed by: DERMATOLOGY

## 2020-08-13 NOTE — PROGRESS NOTES
Suture removal    Date/Time: 8/13/2020 11:24 AM  Performed by: Bryant Carlos  Authorized by: Kenyetta Edward MD     Consent:     Consent obtained:  Verbal  Universal protocol:     Patient identity confirmed:  Verbally with patient  Location:     Location:  [address] [place] [address] location:  Scalp  Procedure details: Tools used:  Suture removal kit    Wound appearance:  No sign(s) of infection  Post-procedure details:     Post-removal:  No dressing applied    Patient tolerance of procedure:   Tolerated well, no immediate complications          Scribe Attestation    I,:   Bryant Carlos am acting as a scribe while in the presence of the attending physician :        I,:   Kenyetta Edward MD personally performed the services described in this documentation    as scribed in my presence :

## 2020-09-30 ENCOUNTER — OFFICE VISIT (OUTPATIENT)
Dept: CARDIOLOGY CLINIC | Facility: CLINIC | Age: 85
End: 2020-09-30
Payer: MEDICARE

## 2020-09-30 VITALS
SYSTOLIC BLOOD PRESSURE: 136 MMHG | DIASTOLIC BLOOD PRESSURE: 92 MMHG | HEART RATE: 67 BPM | HEIGHT: 73 IN | TEMPERATURE: 97.4 F | OXYGEN SATURATION: 99 % | WEIGHT: 315 LBS | BODY MASS INDEX: 41.75 KG/M2

## 2020-09-30 DIAGNOSIS — Z95.810 PRESENCE OF IMPLANTABLE CARDIOVERTER-DEFIBRILLATOR (ICD): ICD-10-CM

## 2020-09-30 DIAGNOSIS — I10 ESSENTIAL HYPERTENSION: ICD-10-CM

## 2020-09-30 DIAGNOSIS — Z95.2 S/P AVR: ICD-10-CM

## 2020-09-30 DIAGNOSIS — I48.0 PAF (PAROXYSMAL ATRIAL FIBRILLATION) (HCC): Primary | ICD-10-CM

## 2020-09-30 DIAGNOSIS — I47.2 VENTRICULAR TACHYCARDIA (HCC): ICD-10-CM

## 2020-09-30 DIAGNOSIS — E66.01 MORBID OBESITY (HCC): ICD-10-CM

## 2020-09-30 DIAGNOSIS — I82.591 CHRONIC DEEP VEIN THROMBOSIS (DVT) OF OTHER VEIN OF RIGHT LOWER EXTREMITY (HCC): ICD-10-CM

## 2020-09-30 DIAGNOSIS — I50.33 ACUTE ON CHRONIC DIASTOLIC HEART FAILURE (HCC): ICD-10-CM

## 2020-09-30 PROCEDURE — 99214 OFFICE O/P EST MOD 30 MIN: CPT | Performed by: INTERNAL MEDICINE

## 2020-09-30 PROCEDURE — 93000 ELECTROCARDIOGRAM COMPLETE: CPT | Performed by: INTERNAL MEDICINE

## 2020-09-30 NOTE — PROGRESS NOTES
Cardiology   Mihai Cortés 81 y o  male MRN: [id number]        Impression:  1  s/p aortic valve replacement for endocarditis - doing well  Echo 3/89 Nml LV systolic function and normal bio AVR  2  Hypertension - previously borderline control  3  s/p ICD for ventricular tachycardia  4  DVT - on warfarin   5  Paroxysmal atrial fibrillation - on Sotalol  On anticoagulation  6  Chronic diastolic heart failure - compensated      Recommendations:  1  Continue current medications  2  Check echocardiogram to evaluation of AVR  3  Follow up in 6 months  HPI: Mihai Cortés is a 81y o  year old male with morbid obesity, hypertension, recent aortic valve replacement for endocarditis who returns for follow up   Asymptomatic from a cardiac standpoint - no chest pain, shortness of breath, or palpitations  Does have some dyspnea on exertion          Review of Systems   Constitutional: Negative  HENT: Negative  Eyes: Negative  Respiratory: Positive for shortness of breath  Negative for chest tightness  Cardiovascular: Positive for leg swelling  Negative for chest pain and palpitations  Gastrointestinal: Negative  Endocrine: Negative  Genitourinary: Negative  Musculoskeletal: Negative  Skin: Negative  Allergic/Immunologic: Negative  Neurological: Negative  Hematological: Negative  Psychiatric/Behavioral: Negative  All other systems reviewed and are negative          Past Medical History:   Diagnosis Date    Anemia     Arthritis     [facility]     [facility]     Diabetes mellitus ([facility] )     Niddm    DVT (deep vein thrombosis) in pregnancy 1966    not in pregnancy    DVT (deep venous thrombosis) ([facility] ) 1966    Dyslipidemia     GERD (gastroesophageal reflux disease)     Hyperlipidemia     Hypertension     Irregular heart beat     Afib    Morbid obesity due to excess calories ([facility] )     Resolved 9/2/2014     Pulmonary [facility]     Sepsis ([facility] )     Squamous cell skin cancer 07/30/2020    Left posterior scalp    Visual impairment      Past Surgical History:   Procedure Laterality Date    AORTIC VALVE REPLACEMENT      CARDIAC DEFIBRILLATOR PLACEMENT  04/2014   [facility] CARDIAC SURGERY  02/2014    AVR    COLONOSCOPY      INSERT / REPLACE / REMOVE PACEMAKER      JOINT REPLACEMENT Left     LTKR    MOHS SURGERY  07/30/2020    Left posterior scalp, Dr Doug Gonzalez SEP-FEM [facility] Right 8/17/2018    Procedure: LEG PERFORATED INJECTION SCLEROTHERAPY;  Surgeon: Santana Gongora MD;  Location: [facility] MA[facility] OR;  Service: Vascular    TOTAL KNEE ARTHROPLASTY Left     VASCULAR SURGERY      VENA CAVA FILTER PLACEMENT      Interruption inferior vena cava, Josue filter, placement    WISDOM TOOTH EXTRACTION       Social History     Substance and Sexual Activity   Alcohol Use Not Currently     Social History     Substance and Sexual Activity   Drug Use No     Social History     Tobacco Use   Smoking Status Never Smoker   Smokeless Tobacco Never Used     Family History   Problem Relation Age of Onset    Arthritis Mother     Stroke Mother     Arthritis Father     Cancer Father     Arthritis Daughter        Allergies:  No Known Allergies    Medications:     Current Outpatient Medications:     acetaminophen (TYLENOL) 325 mg tablet, Take 2 tablets by mouth every 6 (six) hours as needed for mild pain or fever, Disp: 30 tablet, Rfl: 0    amoxicillin (AMOXIL) 500 mg capsule, TK 4 CS BEFORE 2 HOURS BEFORE DENTAL OR FACIAL SURGERY WORK, Disp: , Rfl: 3    atorvastatin (LIPITOR) 40 mg tablet, Take 40 mg by mouth daily after dinner Atorvastatin Calcium 40 MG Oral Tablet Take 1 tablet daily  Refills: 0  Active , Disp: , Rfl:     B Complex-C (SUPER B COMPLEX PO), Take 1 capsule by mouth daily , Disp: , Rfl:     candesartan (ATACAND) 16 mg tablet, Take 1 tablet (16 mg total) by mouth daily, Disp: 90 tablet, Rfl: 3   cholecalciferol (VITAMIN D3) 1,000 units tablet, Take 2,000 Units by mouth daily, Disp: , Rfl:     hydrochlorothiazide (HYDRODIURIL) 25 mg tablet, Take 25 mg by mouth daily , Disp: , Rfl:     Iron Combinations (NIFEREX) TABS, Take one tablet daily, Disp: , Rfl: 5    meloxicam (MOBIC) 15 mg tablet, Take by mouth, Disp: , Rfl:     metFORMIN (GLUCOPHAGE) 1000 MG tablet, Take 1,000 mg by mouth daily after dinner MetFORMIN HCl 1000 MG (MOD) TB24 Take one tablet daily  Refills: 0  Active , Disp: , Rfl:     Multiple Vitamin (MULTIVITAMINS PO), Take 1 tablet by mouth daily, Disp: , Rfl:     mupirocin (BACTROBAN) 2 % ointment, ANUSHA SPARINGLY EXT AA BID, Disp: , Rfl:     omeprazole (PriLOSEC) 20 mg delayed release capsule, Omeprazole 20 MG Oral Tablet Delayed Release Take 1 tablet daily  Refills: 0  Active, Disp: , Rfl:     potassium chloride (K-DUR,KLOR-CON) 20 mEq tablet, Take 20 mEq by mouth daily after dinner Potassium Chloride Jesusita ER 20 MEQ Oral Tablet Extended Release Take 1 tablet daily  Refills: 0  Active , Disp: , Rfl:     sotalol (BETAPACE) 80 mg tablet, Take 80 mg by mouth every 12 (twelve) hours , Disp: , Rfl: 2    torsemide (DEMADEX) 20 mg tablet, TAKE 1 TABLET(20 MG) BY MOUTH TWICE DAILY, Disp: 180 tablet, Rfl: 0    warfarin (COUMADIN) 4 mg tablet, Take 1 tablet (4 mg total) by mouth 2 (two) times a week Patient take 4mg everyday but Tuesday and Friday   (Patient taking differently: Take 4 mg by mouth 2 (two) times a week 4 or 6 mg as directed), Disp: 90 tablet, Rfl: 0    warfarin (COUMADIN) 6 mg tablet, Take 6 mg by mouth The patient takes on Tuesday and Fridays, Disp: , Rfl:       Wt Readings from Last 3 Encounters:   09/30/20 (!) 158 kg (348 lb)   07/30/20 (!) 156 kg (345 lb)   06/29/20 (!) 155 kg (341 lb 6 4 oz)     Temp Readings from Last 3 Encounters:   09/30/20 (!) 97 4 °F (36 3 °C)   07/30/20 97 5 °F (36 4 °C) (Tympanic)   06/29/20 98 3 °F (36 8 °C) (Oral)     BP Readings from Last 3 Encounters:   20 136/92   20 119/72   20 161/73     Pulse Readings from Last 3 Encounters:   20 67   20 77   20 58         Physical Exam  HENT:      Head: Atraumatic  Mouth/Throat:      Mouth: Mucous membranes are moist    Eyes:      Extraocular Movements: Extraocular movements intact  Neck:      Musculoskeletal: Normal range of motion  Cardiovascular:      Rate and Rhythm: Normal rate and regular rhythm  Heart sounds: Normal heart sounds  Pulmonary:      Effort: Pulmonary effort is normal       Breath sounds: Normal breath sounds  Abdominal:      General: Abdomen is flat  Musculoskeletal: Normal range of motion  Skin:     General: Skin is warm  Neurological:      General: No focal deficit present  Mental Status: He is alert and oriented to person, place, and time  Psychiatric:         Mood and Affect: Mood normal          Behavior: Behavior normal            Laboratory Studies:  CMP:  Lab Results   Component Value Date     10/10/2015    K 4 7 2020     2020    CO2 26 2020    ANIONGAP 8 10/10/2015    BUN 23 2020    CREATININE 1 22 2020    GLUCOSE 121 10/10/2015    AST 22 2020    ALT 27 2020    BILITOT 2 23 (H) 10/10/2015    EGFR 54 2020         Cardiac testing:   EKG reviewed personally: NSR 67 LBBB  Results for orders placed during the hospital encounter of 19   Echo complete with contrast if indicated    [facility] [address]  [phone]    Transthoracic Echocardiogram  2D, M-mode, Doppler, and Color Doppler    Study date:  2019    Patient: Michael Wu  MR number: [id number]  Account number: [de-identified]  : 1935  Age: 81 years  Gender: Male  Status: Outpatient  Location: [address] [place] [address] [facility]  Height: 73 in  Weight: 338 4 lb  BP: 124/ 70 mmHg    Indications:  Aortic Valve Disorder    Diagnoses: I35 9 - Nonrheumatic aortic valve disorder, unspecified    Sonographer:  Julien Brown RDCS  Primary Physician:  Adamaris Goldberg MD  Referring Physician:  Gino Matthews MD  Group:  [id number]  Interpreting Physician:  Melissa Kowalski MD    SUMMARY    LEFT VENTRICLE:  Size was at the upper limits of normal   Systolic function was normal  Ejection fraction was estimated in the range of 65 % to 70 %  There were no regional wall motion abnormalities  Wall thickness was increased  There was moderate concentric hypertrophy  There was an increased relative contribution of atrial contraction to ventricular filling  LEFT ATRIUM:  The atrium was moderately dilated  RIGHT ATRIUM:  The atrium was mildly dilated  MITRAL VALVE:  There was moderate annular calcification  There was mild regurgitation  AORTIC VALVE:  A bioprosthesis was present  It exhibited normal function  There was mild regurgitation  TRICUSPID VALVE:  There was mild regurgitation  Estimated peak PA pressure was 35 mmHg  PULMONIC VALVE:  There was trace regurgitation  HISTORY: PRIOR HISTORY: AV Endocarditis, S/P AVR, Hyperlipidemia, Hypertension, V-tach, ICD, A-fib, Heart Failure, DVT, Obesity    PROCEDURE: The study was performed in the [address]  This was a routine study  The transthoracic approach was used  The study included complete 2D imaging, M-mode, complete spectral Doppler, and color Doppler  The  heart rate was 71 bpm, at the start of the study  Images were obtained from the parasternal, apical, subcostal, and suprasternal notch acoustic windows  Echocardiographic views were limited due to decreased penetration  This was a  technically difficult study  LEFT VENTRICLE: Size was at the upper limits of normal  Systolic function was normal  Ejection fraction was estimated in the range of 65 % to 70 %  There were no regional wall motion abnormalities   Wall thickness was increased  There was  moderate concentric hypertrophy  DOPPLER: There was an increased relative contribution of atrial contraction to ventricular filling  RIGHT VENTRICLE: The size was normal  Systolic function was normal  Wall thickness was normal     LEFT ATRIUM: The atrium was moderately dilated  RIGHT ATRIUM: The atrium was mildly dilated  MITRAL VALVE: There was moderate annular calcification  DOPPLER: There was mild regurgitation  AORTIC VALVE: A bioprosthesis was present  It exhibited normal function  DOPPLER: There was mild regurgitation  TRICUSPID VALVE: DOPPLER: There was mild regurgitation  Estimated peak PA pressure was 35 mmHg  PULMONIC VALVE: DOPPLER: There was trace regurgitation  PERICARDIUM: There was no pericardial effusion  AORTA: The root exhibited normal size  SYSTEMIC VEINS: IVC: The inferior vena cava was not well visualized  SYSTEM MEASUREMENT TABLES    2D  %FS: 35 22 %  Ao Diam: 2 88 cm  EDV(Teich): 194 07 ml  EF(Cube): 72 82 %  EF(Teich): 63 57 %  ESV(Cube): 64 82 ml  ESV(Teich): 70 71 ml  IVSd: 1 47 cm  LA Area: 26 99 cm2  LA Diam: 4 78 cm  LVEDV MOD A4C: 173 24 ml  LVEF MOD A4C: 63 99 %  LVESV MOD A4C: 62 39 ml  LVIDd: 6 2 cm  LVIDs: 4 02 cm  LVLd A4C: 8 88 cm  LVLs A4C: 7 87 cm  LVOT Diam: 1 8 cm  LVPWd: 0 86 cm  RA Area: 22 24 cm2  RV Diam : 3 6 cm  SI(Cube): 64 31 ml/m2  SI(Teich): 45 69 ml/m2  SV MOD A4C: 110 85 ml  SV(Cube): 173 65 ml  SV(Teich): 123 37 ml    CW  AV Env  Ti: 273 57 ms  AV VTI: 35 86 cm  AV Vmax: 2 m/s  AV Vmean: 1 3 m/s  AV maxP 26 mmHg  AV meanP 02 mmHg  TR Vmax: 2 9 m/s  TR maxP 56 mmHg    MM  TAPSE: 1 09 cm    PW  E': 0 05 m/s  E/E': 27 12  MV A Juan: 1 23 m/s  MV Dec Wirt: 4 11 m/s2  MV DecT: 302 23 ms  MV E Juan: 1 24 m/s  MV E/A Ratio: 1 01    [employer] Accredited Echocardiography Laboratory    Prepared and electronically signed by    Aleksandr Mcmullen MD  Signed [id number] 35:95:51

## 2020-09-30 NOTE — PATIENT INSTRUCTIONS
Recommendations:  1  Continue current medications  2  Check echocardiogram to evaluation of AVR  3  Follow up in 6 months

## 2020-10-12 ENCOUNTER — TELEPHONE (OUTPATIENT)
Dept: ADMINISTRATIVE | Facility: HOSPITAL | Age: 85
End: 2020-10-12

## 2020-10-12 ENCOUNTER — OFFICE VISIT (OUTPATIENT)
Dept: VASCULAR SURGERY | Facility: CLINIC | Age: 85
End: 2020-10-12
Payer: MEDICARE

## 2020-10-12 VITALS
DIASTOLIC BLOOD PRESSURE: 78 MMHG | TEMPERATURE: 96.8 F | HEIGHT: 73 IN | SYSTOLIC BLOOD PRESSURE: 146 MMHG | WEIGHT: 315 LBS | HEART RATE: 84 BPM | BODY MASS INDEX: 41.75 KG/M2

## 2020-10-12 DIAGNOSIS — I89.0 SECONDARY LYMPHEDEMA: Primary | ICD-10-CM

## 2020-10-12 PROCEDURE — 99213 OFFICE O/P EST LOW 20 MIN: CPT | Performed by: NURSE PRACTITIONER

## 2020-11-05 ENCOUNTER — APPOINTMENT (EMERGENCY)
Dept: RADIOLOGY | Facility: HOSPITAL | Age: 85
End: 2020-11-05
Payer: MEDICARE

## 2020-11-05 ENCOUNTER — TELEPHONE (OUTPATIENT)
Dept: CARDIOLOGY CLINIC | Facility: CLINIC | Age: 85
End: 2020-11-05

## 2020-11-05 ENCOUNTER — HOSPITAL ENCOUNTER (EMERGENCY)
Facility: HOSPITAL | Age: 85
Discharge: HOME | End: 2020-11-05
Attending: EMERGENCY MEDICINE | Admitting: EMERGENCY MEDICINE
Payer: MEDICARE

## 2020-11-05 VITALS
TEMPERATURE: 98.2 F | RESPIRATION RATE: 16 BRPM | HEART RATE: 73 BPM | DIASTOLIC BLOOD PRESSURE: 73 MMHG | OXYGEN SATURATION: 95 % | SYSTOLIC BLOOD PRESSURE: 152 MMHG

## 2020-11-05 DIAGNOSIS — M54.9 UPPER BACK PAIN ON LEFT SIDE: Primary | ICD-10-CM

## 2020-11-05 LAB
ANION GAP SERPL CALCULATED.3IONS-SCNC: 10 MMOL/L (ref 4–13)
BASOPHILS # BLD AUTO: 0.03 THOUSANDS/ΜL (ref 0–0.1)
BASOPHILS NFR BLD AUTO: 0 % (ref 0–1)
BUN SERPL-MCNC: 27 MG/DL (ref 5–25)
CALCIUM SERPL-MCNC: 9.4 MG/DL (ref 8.3–10.1)
CHLORIDE SERPL-SCNC: 105 MMOL/L (ref 100–108)
CO2 SERPL-SCNC: 24 MMOL/L (ref 21–32)
CREAT SERPL-MCNC: 1.25 MG/DL (ref 0.6–1.3)
EOSINOPHIL # BLD AUTO: 0.13 THOUSAND/ΜL (ref 0–0.61)
EOSINOPHIL NFR BLD AUTO: 2 % (ref 0–6)
ERYTHROCYTE [DISTWIDTH] IN BLOOD BY AUTOMATED COUNT: 13.9 % (ref 11.6–15.1)
GFR SERPL CREATININE-BSD FRML MDRD: 53 ML/MIN/1.73SQ M
GLUCOSE SERPL-MCNC: 111 MG/DL (ref 65–140)
HCT VFR BLD AUTO: 38.7 % (ref 36.5–49.3)
HGB BLD-MCNC: 12.7 G/DL (ref 12–17)
IMM GRANULOCYTES # BLD AUTO: 0.03 THOUSAND/UL (ref 0–0.2)
IMM GRANULOCYTES NFR BLD AUTO: 0 % (ref 0–2)
INR PPP: 2.01 (ref 0.84–1.19)
LYMPHOCYTES # BLD AUTO: 1.35 THOUSANDS/ΜL (ref 0.6–4.47)
LYMPHOCYTES NFR BLD AUTO: 19 % (ref 14–44)
MCH RBC QN AUTO: 30.7 PG (ref 26.8–34.3)
MCHC RBC AUTO-ENTMCNC: 32.8 G/DL (ref 31.4–37.4)
MCV RBC AUTO: 94 FL (ref 82–98)
MONOCYTES # BLD AUTO: 0.8 THOUSAND/ΜL (ref 0.17–1.22)
MONOCYTES NFR BLD AUTO: 11 % (ref 4–12)
NEUTROPHILS # BLD AUTO: 4.93 THOUSANDS/ΜL (ref 1.85–7.62)
NEUTS SEG NFR BLD AUTO: 68 % (ref 43–75)
NRBC BLD AUTO-RTO: 0 /100 WBCS
PLATELET # BLD AUTO: 214 THOUSANDS/UL (ref 149–390)
PMV BLD AUTO: 10.1 FL (ref 8.9–12.7)
POTASSIUM SERPL-SCNC: 5 MMOL/L (ref 3.5–5.3)
PROTHROMBIN TIME: 22.8 SECONDS (ref 11.6–14.5)
RBC # BLD AUTO: 4.14 MILLION/UL (ref 3.88–5.62)
SODIUM SERPL-SCNC: 139 MMOL/L (ref 136–145)
TROPONIN I SERPL-MCNC: <0.02 NG/ML
WBC # BLD AUTO: 7.27 THOUSAND/UL (ref 4.31–10.16)

## 2020-11-05 PROCEDURE — 85025 COMPLETE CBC W/AUTO DIFF WBC: CPT | Performed by: EMERGENCY MEDICINE

## 2020-11-05 PROCEDURE — 80048 BASIC METABOLIC PNL TOTAL CA: CPT | Performed by: EMERGENCY MEDICINE

## 2020-11-05 PROCEDURE — 71046 X-RAY EXAM CHEST 2 VIEWS: CPT

## 2020-11-05 PROCEDURE — 84484 ASSAY OF TROPONIN QUANT: CPT | Performed by: EMERGENCY MEDICINE

## 2020-11-05 PROCEDURE — 85610 PROTHROMBIN TIME: CPT | Performed by: EMERGENCY MEDICINE

## 2020-11-05 PROCEDURE — 36415 COLL VENOUS BLD VENIPUNCTURE: CPT | Performed by: EMERGENCY MEDICINE

## 2020-11-05 PROCEDURE — 99284 EMERGENCY DEPT VISIT MOD MDM: CPT

## 2020-11-05 PROCEDURE — 99285 EMERGENCY DEPT VISIT HI MDM: CPT | Performed by: EMERGENCY MEDICINE

## 2020-11-05 PROCEDURE — 93005 ELECTROCARDIOGRAM TRACING: CPT

## 2020-11-05 RX ORDER — LIDOCAINE 50 MG/G
2 PATCH TOPICAL ONCE
Status: DISCONTINUED | OUTPATIENT
Start: 2020-11-05 | End: 2020-11-05 | Stop reason: HOSPADM

## 2020-11-05 RX ADMIN — LIDOCAINE 5% 2 PATCH: 700 PATCH TOPICAL at 14:59

## 2020-11-06 LAB
ATRIAL RATE: 65 BPM
P AXIS: 45 DEGREES
PR INTERVAL: 310 MS
QRS AXIS: -73 DEGREES
QRSD INTERVAL: 172 MS
QT INTERVAL: 482 MS
QTC INTERVAL: 494 MS
T WAVE AXIS: 83 DEGREES
VENTRICULAR RATE: 63 BPM

## 2020-11-06 PROCEDURE — 93010 ELECTROCARDIOGRAM REPORT: CPT | Performed by: INTERNAL MEDICINE

## 2020-11-17 ENCOUNTER — HOSPITAL ENCOUNTER (OUTPATIENT)
Dept: NON INVASIVE DIAGNOSTICS | Facility: CLINIC | Age: 85
Discharge: HOME | End: 2020-11-17
Payer: MEDICARE

## 2020-11-17 DIAGNOSIS — I48.0 PAF (PAROXYSMAL ATRIAL FIBRILLATION) (HCC): ICD-10-CM

## 2020-11-17 DIAGNOSIS — Z95.2 S/P AVR: ICD-10-CM

## 2020-11-17 DIAGNOSIS — I50.33 ACUTE ON CHRONIC DIASTOLIC HEART FAILURE (HCC): ICD-10-CM

## 2020-11-17 PROCEDURE — 93306 TTE W/DOPPLER COMPLETE: CPT

## 2020-11-17 PROCEDURE — 93306 TTE W/DOPPLER COMPLETE: CPT | Performed by: INTERNAL MEDICINE

## 2020-12-02 ENCOUNTER — TELEPHONE (OUTPATIENT)
Dept: CARDIOLOGY CLINIC | Facility: CLINIC | Age: 85
End: 2020-12-02

## 2021-02-25 DIAGNOSIS — N45.1 EPIDIDYMITIS: Primary | ICD-10-CM

## 2021-04-06 ENCOUNTER — REMOTE DEVICE CLINIC VISIT (OUTPATIENT)
Dept: CARDIOLOGY CLINIC | Facility: CLINIC | Age: 86
End: 2021-04-06
Payer: MEDICARE

## 2021-04-06 DIAGNOSIS — Z95.810 PRESENCE OF IMPLANTABLE CARDIOVERTER-DEFIBRILLATOR (ICD): Primary | ICD-10-CM

## 2021-04-06 PROCEDURE — 93295 DEV INTERROG REMOTE 1/2/MLT: CPT | Performed by: INTERNAL MEDICINE

## 2021-04-06 PROCEDURE — 93296 REM INTERROG EVL PM/IDS: CPT | Performed by: INTERNAL MEDICINE

## 2021-04-06 NOTE — PROGRESS NOTES
MDT DUAL CHAMBER ICD   CARELINK TRANSMISSION:  BATTERY VOLTAGE ADEQUATE (4 5 YR)   AP 4 1%  <0 1%    ALL LEAD PARAMETERS WITHIN NORMAL LIMITS   NO HIGH RATE EPISODES   NORMAL DEVICE FUNCTION  Romaine Negrete

## 2021-05-12 ENCOUNTER — OFFICE VISIT (OUTPATIENT)
Dept: CARDIOLOGY CLINIC | Facility: CLINIC | Age: 86
End: 2021-05-12
Payer: MEDICARE

## 2021-05-12 VITALS
SYSTOLIC BLOOD PRESSURE: 136 MMHG | DIASTOLIC BLOOD PRESSURE: 78 MMHG | BODY MASS INDEX: 41.75 KG/M2 | WEIGHT: 315 LBS | HEIGHT: 73 IN | HEART RATE: 68 BPM

## 2021-05-12 DIAGNOSIS — I82.591 CHRONIC DEEP VEIN THROMBOSIS (DVT) OF OTHER VEIN OF RIGHT LOWER EXTREMITY (HCC): ICD-10-CM

## 2021-05-12 DIAGNOSIS — I48.0 PAF (PAROXYSMAL ATRIAL FIBRILLATION) (HCC): Primary | ICD-10-CM

## 2021-05-12 DIAGNOSIS — Z95.810 PRESENCE OF IMPLANTABLE CARDIOVERTER-DEFIBRILLATOR (ICD): ICD-10-CM

## 2021-05-12 DIAGNOSIS — I10 ESSENTIAL HYPERTENSION: ICD-10-CM

## 2021-05-12 DIAGNOSIS — Z95.2 S/P AVR: ICD-10-CM

## 2021-05-12 PROCEDURE — 99214 OFFICE O/P EST MOD 30 MIN: CPT | Performed by: INTERNAL MEDICINE

## 2021-05-12 PROCEDURE — 93000 ELECTROCARDIOGRAM COMPLETE: CPT | Performed by: INTERNAL MEDICINE

## 2021-05-12 RX ORDER — ALBUTEROL SULFATE 90 UG/1
AEROSOL, METERED RESPIRATORY (INHALATION)
COMMUNITY
Start: 2021-03-04

## 2021-05-12 NOTE — PROGRESS NOTES
Cardiology   Jyoti Terry 81 y o  male MRN: [id number]        Impression:  1  s/p aortic valve replacement for endocarditis - doing well  Echo 5/36 Nml LV systolic function and normal bio AVR  2  Hypertension - previously borderline control  3  s/p ICD for ventricular tachycardia  4  DVT - on warfarin   5  Paroxysmal atrial fibrillation - in NSR  on Sotalol  On anticoagulation  6  Chronic diastolic heart failure - compensated      Recommendations:  1  Continue current medications  2  Follow up in 6 months  HPI: Jyoti Terry is a 81y o  year old male with morbid obesity, hypertension, recent aortic valve replacement for endocarditis who returns for follow up   Asymptomatic from a cardiac standpoint - no chest pain, shortness of breath, or palpitations  Does have some dyspnea on exertion, which is getting worse  Review of Systems   Constitutional: Negative  HENT: Negative  Eyes: Negative  Respiratory: Positive for shortness of breath  Negative for chest tightness  Cardiovascular: Negative for chest pain, palpitations and leg swelling  Gastrointestinal: Negative  Endocrine: Negative  Genitourinary: Negative  Musculoskeletal: Negative  Skin: Negative  Allergic/Immunologic: Negative  Neurological: Negative  Hematological: Negative  Psychiatric/Behavioral: Negative  All other systems reviewed and are negative          Past Medical History:   Diagnosis Date    Anemia     Arthritis     [facility]rial fibrillation [facility]     [facility]     Diabetes mellitus ([facility] )     Niddm    DVT (deep vein thrombosis) in pregnancy 1966    not in pregnancy    DVT (deep venous thrombosis) ([facility] ) 1966    Dyslipidemia     GERD (gastroesophageal reflux disease)     Hyperlipidemia     Hypertension     Irregular heart beat     Afib    Morbid obesity due to excess calories ([address] )     Resolved 9/2/2014     Pulmonary embolism ([address] )     Sepsis ([facility] [facility] )     Squamous cell skin cancer 07/30/2020    Left posterior scalp    Visual impairment      Past Surgical History:   Procedure Laterality Date    AORTIC VALVE REPLACEMENT      CARDIAC DEFIBRILLATOR PLACEMENT  04/2014   Wash Caller CARDIAC SURGERY  02/2014    AVR    COLONOSCOPY      INSERT / REPLACE / REMOVE PACEMAKER      JOINT REPLACEMENT Left     LTKR    MOHS SURGERY  07/30/2020    Left posterior scalp, Dr Buffy Peralta [facility] Right 8/17/2018    Procedure: LEG PERFORATED INJECTION SCLEROTHERAPY;  Surgeon: Nara Yancey MD;  Location: [facility];  Service: Vascular    TOTAL KNEE ARTHROPLASTY Left     VASCULAR SURGERY      VENA CAVA FILTER PLACEMENT      Interruption inferior vena cava, Josue filter, placement    WISDOM TOOTH EXTRACTION       Social History     Substance and Sexual Activity   Alcohol Use Not Currently     Social History     Substance and Sexual Activity   Drug Use No     Social History     Tobacco Use   Smoking Status Never Smoker   Smokeless Tobacco Never Used     Family History   Problem Relation Age of Onset    Arthritis Mother     Stroke Mother     Arthritis Father     Cancer Father     Arthritis Daughter        Allergies:  No Known Allergies    Medications:     Current Outpatient Medications:     atorvastatin (LIPITOR) 40 mg tablet, Take 40 mg by mouth daily after dinner Atorvastatin Calcium 40 MG Oral Tablet Take 1 tablet daily  Refills: 0  Active , Disp: , Rfl:     B Complex-C (SUPER B COMPLEX PO), Take 1 capsule by mouth daily , Disp: , Rfl:     candesartan (ATACAND) 16 mg tablet, Take 1 tablet (16 mg total) by mouth daily, Disp: 90 tablet, Rfl: 3    cholecalciferol (VITAMIN D3) 1,000 units tablet, Take 2,000 Units by mouth daily, Disp: , Rfl:     hydrochlorothiazide (HYDRODIURIL) 25 mg tablet, Take 25 mg by mouth daily , Disp: , Rfl:     meloxicam (MOBIC) 15 mg tablet, Take by mouth daily , Disp: , Rfl:     metFORMIN (GLUCOPHAGE) 1000 MG tablet, Take 1,000 mg by mouth daily after dinner MetFORMIN HCl 1000 MG (MOD) TB24 Take one tablet daily  Refills: 0  Active , Disp: , Rfl:     Multiple Vitamin (MULTIVITAMINS PO), Take 1 tablet by mouth daily, Disp: , Rfl:     omeprazole (PriLOSEC) 20 mg delayed release capsule, Omeprazole 20 MG Oral Tablet Delayed Release Take 1 tablet daily  Refills: 0  Active, Disp: , Rfl:     potassium chloride (K-DUR,KLOR-CON) 20 mEq tablet, Take 20 mEq by mouth daily after dinner Potassium Chloride Jesusita ER 20 MEQ Oral Tablet Extended Release Take 1 tablet daily  Refills: 0  Active , Disp: , Rfl:     sotalol (BETAPACE) 80 mg tablet, Take 80 mg by mouth every 12 (twelve) hours , Disp: , Rfl: 2    torsemide (DEMADEX) 20 mg tablet, TAKE 1 TABLET(20 MG) BY MOUTH TWICE DAILY, Disp: 180 tablet, Rfl: 0    warfarin (COUMADIN) 4 mg tablet, Take 1 tablet (4 mg total) by mouth 2 (two) times a week Patient take 4mg everyday but Tuesday and Friday   (Patient taking differently: Take 4 mg by mouth 2 (two) times a week 4 or 6 mg as directed), Disp: 90 tablet, Rfl: 0    warfarin (COUMADIN) 6 mg tablet, Take 6 mg by mouth The patient takes on Tuesday and Fridays, Disp: , Rfl:     acetaminophen (TYLENOL) 325 mg tablet, Take 2 tablets by mouth every 6 (six) hours as needed for mild pain or fever (Patient not taking: Reported on 5/12/2021), Disp: 30 tablet, Rfl: 0    albuterol (PROVENTIL HFA,VENTOLIN HFA) 90 mcg/act inhaler, INHALE 2 PUFFS BY MOUTH EVERY 6 HOURS AS NEEDED FOR WHEEZING OR SHORTNESS OF BREATH, Disp: , Rfl:     amoxicillin (AMOXIL) 500 mg capsule, TK 4 CS BEFORE 2 HOURS BEFORE DENTAL OR FACIAL SURGERY WORK, Disp: , Rfl: 3    Iron Combinations (NIFEREX) TABS, Take one tablet daily, Disp: , Rfl: 5    mupirocin (BACTROBAN) 2 % ointment, as needed , Disp: , Rfl:       Wt Readings from Last 3 Encounters:   05/12/21 (!) 159 kg (351 lb 3 2 oz)   10/12/20 (!) 160 kg (352 lb)   09/30/20 (!) 158 kg (348 lb)     Temp Readings from Last 3 Encounters:   11/05/20 98 2 °F (36 8 °C)   10/12/20 (!) 96 8 °F (36 °C) (Tympanic)   09/30/20 (!) 97 4 °F (36 3 °C)     BP Readings from Last 3 Encounters:   05/12/21 136/78   11/05/20 152/73   10/12/20 146/78     Pulse Readings from Last 3 Encounters:   05/12/21 68   11/05/20 73   10/12/20 84         Physical Exam  HENT:      Head: Atraumatic  Mouth/Throat:      Mouth: Mucous membranes are moist    Eyes:      Extraocular Movements: Extraocular movements intact  Neck:      Musculoskeletal: Normal range of motion  Cardiovascular:      Rate and Rhythm: Normal rate and regular rhythm  Heart sounds: Murmur present  Systolic murmur present with a grade of 2/6  Pulmonary:      Effort: Pulmonary effort is normal       Breath sounds: Normal breath sounds  Abdominal:      General: Abdomen is flat  Musculoskeletal: Normal range of motion  Skin:     General: Skin is warm  Neurological:      General: No focal deficit present  Mental Status: He is alert and oriented to person, place, and time     Psychiatric:         Mood and Affect: Mood normal          Behavior: Behavior normal            Laboratory Studies:  CMP:  Lab Results   Component Value Date     10/10/2015    K 5 0 11/05/2020     11/05/2020    CO2 24 11/05/2020    ANIONGAP 8 10/10/2015    BUN 27 (H) 11/05/2020    CREATININE 1 25 11/05/2020    GLUCOSE 121 10/10/2015    AST 22 06/29/2020    ALT 27 06/29/2020    BILITOT 2 23 (H) 10/10/2015    EGFR 53 11/05/2020         Cardiac testing:   EKG reviewed personally: NSR 68 1st deg AV block LBBB  Results for orders placed during the hospital encounter of 11/17/20   Echo complete with contrast if indicated    Narrative [address]  [phone]    Transthoracic Echocardiogram  2D, M-mode, Doppler, and Color Doppler    Study date:  17-Nov-2020    Patient: Mehnaz Shi  MR number: [id number]  Account number: [de-identified]  : 1935  Age: 81 years  Gender: Male  Status: Outpatient  [facility] [address] [place] [address] [facility]  Height: 73 in  Weight: 351 3 lb  BP: 152/ 73 mmHg    Indications: PAF; Acute on chronic diastolic HF;s/p AVR  Diagnoses: I35 9 - Nonrheumatic aortic valve disorder, unspecified, I48 0 - Atrial fibrillation, I50 9 - Heart failure, unspecified    Sonographer:  ABBY Daniels  Interpreting Physician:  Brittaney Leahy MD  Primary Physician:  Chester Holland DO  Referring Physician:  Juliana Khanna MD  Group:  [id number]  Cardiology Fellow:  Nakul Vaz DO    SUMMARY    PROCEDURE INFORMATION:  This was a technically difficult study  Echocardiographic views were limited due to restricted patient mobility, poor patient compliance, poor acoustic window availability, decreased penetration, and lung interference  LEFT VENTRICLE:  Systolic function was normal  Ejection fraction was estimated to be 55 %  Although no diagnostic regional wall motion abnormality was identified, this possibility cannot be completely excluded on the basis of this study  Wall thickness was mildly to moderately increased  There was mild concentric hypertrophy  RIGHT VENTRICLE:  The ventricle was mildly dilated  Wall thickness was mildly increased  LEFT ATRIUM:  The atrium was mildly to moderately dilated  RIGHT ATRIUM:  The atrium was mildly to moderately dilated  MITRAL VALVE:  There was mild to moderate annular calcification  There was mild regurgitation  AORTIC VALVE:  A bioprosthesis was present  It exhibited normal function  The peak valve velocity was 197 cm/s  The mean valve velocity was 152 cm/s  Valve peak gradient was 16 mmHg  Valve mean gradient was 9 mmHg  Estimated aortic valve area (by VTI) was 2 16 cmï¾²  TRICUSPID VALVE:  There was mild regurgitation  Pulmonary artery systolic pressure was mildly increased    Estimated peak PA pressure was 40 mmHg  PULMONIC VALVE:  There was mild regurgitation  HISTORY: PRIOR HISTORY: HTN;s/p ICD;VT;SOB; Morbid obesity  PROCEDURE: The study was performed in the [address]  This was a routine study  The transthoracic approach was used  The study included complete 2D imaging, M-mode, complete spectral Doppler, and color Doppler  The  heart rate was 67 bpm, at the start of the study  Images were obtained from the parasternal, apical, subcostal, and suprasternal notch acoustic windows  Echocardiographic views were limited due to restricted patient mobility, poor patient  compliance, poor acoustic window availability, decreased penetration, and lung interference  This was a technically difficult study  LEFT VENTRICLE: Size was normal  Systolic function was normal  Ejection fraction was estimated to be 55 %  Although no diagnostic regional wall motion abnormality was identified, this possibility cannot be completely excluded on the basis  of this study  Wall thickness was mildly to moderately increased  There was mild concentric hypertrophy  RIGHT VENTRICLE: The ventricle was mildly dilated  Systolic function was low normal  Wall thickness was mildly increased  A pacing wire was present in the ventricular cavity  LEFT ATRIUM: The atrium was mildly to moderately dilated  RIGHT ATRIUM: The atrium was mildly to moderately dilated  MITRAL VALVE: There was mild to moderate annular calcification  There was normal leaflet separation  DOPPLER: The transmitral velocity was within the normal range  There was no evidence for stenosis  There was mild regurgitation  AORTIC VALVE: A bioprosthesis was present  It exhibited normal function  TRICUSPID VALVE: The valve structure was normal  There was normal leaflet separation  DOPPLER: The transtricuspid velocity was within the normal range  There was no evidence for stenosis  There was mild regurgitation   Pulmonary artery  systolic pressure was mildly increased  Estimated peak PA pressure was 40 mmHg  PULMONIC VALVE: Leaflets exhibited normal thickness, no calcification, and normal cuspal separation  DOPPLER: The transpulmonic velocity was within the normal range  There was mild regurgitation  PERICARDIUM: There was no pericardial effusion  AORTA: The root exhibited normal size  SYSTEMIC VEINS: IVC: The inferior vena cava was not well visualized  MEASUREMENT TABLES    2D MEASUREMENTS  LVOT   (Reference normals)  Diam   23 mm   (--)    DOPPLER MEASUREMENTS  LVOT   (Reference normals)  Peak annie   101 cm/s   (--)  Mean annie   70 cm/s   (--)  VTI   23 cm   (--)  Peak gradient   4 mmHg   (--)  Mean gradient   2 2 mmHg   (--)  Stroke vol   95 56 ml   (--)  Aortic valve   (Reference normals)  Peak annie   197 cm/s   (--)  Mean annie   152 cm/s   (--)  VTI   44 cm   (--)  Peak gradient   16 mmHg   (--)  Mean gradient   9 mmHg   (--)  Obstr index, VTI   0 52    (--)  Valve area, VTI   2 16 cmï¾²   (--)  Area index, VTI   0 79 cmï¾²/mï¾²   (--)  Obstr index, Vmax   0 51    (--)  Valve area, Vmax   2 12 cmï¾²   (--)  Area index, Vmax   0 77 cmï¾²/mï¾²   (--)  Obstr index, Vmean   0 46    (--)  Valve area, Vmean   1 91 cmï¾²   (--)  Area index, Vmean   0 7 cmï¾²/mï¾²   (--)    SYSTEM MEASUREMENT TABLES    2D  %FS: 22 82 %  Ao Diam: 2 49 cm  EDV(Teich): 57 38 ml  EF(Teich): 46 66 %  ESV(Teich): 30 61 ml  IVSd: 1 96 cm  LA Area: 26 79 cm2  LA Diam: 4 97 cm  LVEDV MOD A4C: 112 1 ml  LVEF MOD A4C: 50 26 %  LVESV MOD A4C: 55 76 ml  LVIDd: 3 68 cm  LVIDs: 2 84 cm  LVLd A4C: 8 11 cm  LVLs A4C: 6 99 cm  LVOT Diam: 2 25 cm  LVPWd: 1 98 cm  RA Area: 24 17 cm2  RVIDd: 4 17 cm  SV MOD A4C: 56 34 ml  SV(Teich): 26 78 ml    CW  AV Env  Ti: 296 86 ms  AV VTI: 39 2 cm  AV Vmax: 2 1 m/s  AV Vmean: 1 32 m/s  AV maxP 68 mmHg  AV meanP 71 mmHg  TR Vmax: 2 85 m/s  TR maxP 45 mmHg    MM  TAPSE: 1 69 cm    PW  KISHA (VTI): 2 34 cm2  KISHA Vmax: 1 91 cm2  AVAI (VTI): 0 cm2/m2  AVAI Vmax: 0 cm2/m2  E' Sept: 0 06 m/s  E/E' Sept: 19 55  LVOT Env  Ti: 327 31 ms  LVOT VTI: 22 97 cm  LVOT Vmax: 1 01 m/s  LVOT Vmean: 0 7 m/s  LVOT maxP 04 mmHg  LVOT meanP 24 mmHg  LVSI Dopp: 33 45 ml/m2  LVSV Dopp: 91 66 ml  MV A Juan: 1 43 m/s  MV Dec Dodge: 5 21 m/s2  MV DecT: 213 91 ms  MV E Juan: 1 11 m/s  MV E/A Ratio: 0 78  MV PHT: 62 03 ms  MVA By PHT: 3 55 cm2    [employer] Accredited Echocardiography Laboratory    Prepared and electronically signed by    Roseline Huertas MD  Signed [id number] 10:03:59

## 2021-05-18 ENCOUNTER — IN-CLINIC DEVICE VISIT (OUTPATIENT)
Dept: CARDIOLOGY CLINIC | Facility: CLINIC | Age: 86
End: 2021-05-18
Payer: MEDICARE

## 2021-05-18 DIAGNOSIS — Z95.810 AICD (AUTOMATIC CARDIOVERTER/DEFIBRILLATOR) PRESENT: Primary | ICD-10-CM

## 2021-05-18 PROCEDURE — 93283 PRGRMG EVAL IMPLANTABLE DFB: CPT | Performed by: INTERNAL MEDICINE

## 2021-05-18 NOTE — PROGRESS NOTES
Results for orders placed or performed in visit on 05/18/21   Cardiac EP device report    Narrative    MDT DUAL CHAMBER ICD/ NOT MRI CONDITIONAL  DEVICE INTERROGATED IN THE [facility] OFFICE  BATTERY VOLTAGE ADEQUATE (4 4 YRS)  AP-3%, -0 1%  ALL LEAD PARAMETERS WITHIN NORMAL LIMITS  NO SIGNIFICANT HIGH RATE EPISODES  NORMAL DEVICE FUNCTION   GV

## 2021-07-23 ENCOUNTER — OFFICE VISIT (OUTPATIENT)
Dept: VASCULAR SURGERY | Facility: CLINIC | Age: 86
End: 2021-07-23
Payer: MEDICARE

## 2021-07-23 VITALS
BODY MASS INDEX: 41.75 KG/M2 | DIASTOLIC BLOOD PRESSURE: 80 MMHG | SYSTOLIC BLOOD PRESSURE: 140 MMHG | WEIGHT: 315 LBS | HEART RATE: 71 BPM | HEIGHT: 73 IN | TEMPERATURE: 97.6 F | RESPIRATION RATE: 16 BRPM

## 2021-07-23 DIAGNOSIS — I87.2 CHRONIC VENOUS INSUFFICIENCY: Primary | ICD-10-CM

## 2021-07-23 PROCEDURE — 99212 OFFICE O/P EST SF 10 MIN: CPT | Performed by: SURGERY

## 2021-07-23 RX ORDER — FLUTICASONE PROPIONATE 50 MCG
2 SPRAY, SUSPENSION (ML) NASAL AS NEEDED
COMMUNITY
Start: 2021-07-07

## 2021-07-23 NOTE — PROGRESS NOTES
Assessment/Plan:    Chronic venous insufficiency  Extensive history of DVT in past   Chronic venous insufficiency in both legs  Right leg had non healing ulcer in 2018, after performing  sclerotherapy he underwent skin graft and it has remained healed since then  He is now using compression pumps in addition to compression stockings  Overall doing very well with management of edema and venous stasis skin changes  Using aquaphor ointment daily for moisturizer  Follow up in 6 months  If he develops any open wounds he should contact us as we will have to evaluate the left leg for reflux  Diagnoses and all orders for this visit:    Chronic venous insufficiency    Other orders  -     fluticasone (FLONASE) 50 mcg/act nasal spray; 2 sprays as needed Shake liquid and spray           Subjective:      Patient ID: Srinivas Yeung is a 81 y o  male  Patient presents in office for 3 month follow up of L leg burning and redness  HPI  Follow up after starting the use of lymphedema compression pump therapy  His left leg has improved over time with use of compression  No open wounds or drainage  He is also using stockings regularly  On long term coumadin for DVTs  The following portions of the patient's history were reviewed and updated as appropriate: allergies, current medications, past family history, past medical history, past social history, past surgical history and problem list     Review of Systems   Constitutional: Negative  Negative for chills and fever  HENT: Negative  Negative for hearing loss and trouble swallowing  Eyes: Negative  Negative for visual disturbance  Respiratory: Negative  Negative for cough, chest tightness and shortness of breath  Cardiovascular: Positive for leg swelling  Gastrointestinal: Negative  Negative for diarrhea, nausea and vomiting  Endocrine: Negative  Genitourinary: Negative      Musculoskeletal: Positive for gait problem (ambulates with cane)  Skin: Negative  Negative for wound  Allergic/Immunologic: Negative  Neurological: Negative for dizziness, speech difficulty, weakness, numbness and headaches  Hematological: Negative  Does not bruise/bleed easily  Psychiatric/Behavioral: Negative  Negative for self-injury and suicidal ideas  I have reviewed the ROS as entered and made changes as necessary  Objective:      /80 (BP Location: Left arm, Patient Position: Sitting, Cuff Size: Adult)   Pulse 71   Temp 97 6 °F (36 4 °C) (Tympanic)   Resp 16   Ht 6' 1" (1 854 m)   Wt (!) 159 kg (351 lb)   BMI 46 31 kg/m²          Physical Exam  Vitals and nursing note reviewed  Constitutional:       Appearance: He is obese  Cardiovascular:      Rate and Rhythm: Normal rate and regular rhythm  Musculoskeletal:      Right lower leg: Edema present  Left lower leg: Edema present  Skin:     General: Skin is warm  Capillary Refill: Capillary refill takes less than 2 seconds  Comments: Bilateral lower extremity venous hyperpigmentation  No open wounds

## 2021-07-23 NOTE — PATIENT INSTRUCTIONS
Continue with daily moisturizer, elevation, compression stockings and use of lymphedema compression pump therapy  If there is any deterioration in the skin quality of the skin by the inner ankle area,  if there are any skin openings or drainage/ seepage please contact us

## 2021-07-24 NOTE — ASSESSMENT & PLAN NOTE
Extensive history of DVT in past   Chronic venous insufficiency in both legs  Right leg had non healing ulcer in 2018, after performing  sclerotherapy he underwent skin graft and it has remained healed since then  He is now using compression pumps in addition to compression stockings  Overall doing very well with management of edema and venous stasis skin changes  Using aquaphor ointment daily for moisturizer  Follow up in 6 months  If he develops any open wounds he should contact us as we will have to evaluate the left leg for reflux

## 2021-08-10 ENCOUNTER — APPOINTMENT (OUTPATIENT)
Dept: LAB | Age: 86
End: 2021-08-10
Payer: MEDICARE

## 2021-08-10 LAB
BACTERIA UR QL AUTO: ABNORMAL /HPF
BILIRUB UR QL STRIP: NEGATIVE
CLARITY UR: CLEAR
COLOR UR: YELLOW
GLUCOSE UR STRIP-MCNC: NEGATIVE MG/DL
HGB UR QL STRIP.AUTO: NEGATIVE
HYALINE CASTS #/AREA URNS LPF: ABNORMAL /LPF
KETONES UR STRIP-MCNC: NEGATIVE MG/DL
LEUKOCYTE ESTERASE UR QL STRIP: NEGATIVE
NITRITE UR QL STRIP: NEGATIVE
NON-SQ EPI CELLS URNS QL MICRO: ABNORMAL /HPF
PH UR STRIP.AUTO: 5.5 [PH]
PROT UR STRIP-MCNC: NEGATIVE MG/DL
RBC #/AREA URNS AUTO: ABNORMAL /HPF
SP GR UR STRIP.AUTO: 1.02 (ref 1–1.03)
UROBILINOGEN UR QL STRIP.AUTO: 0.2 E.U./DL
WBC #/AREA URNS AUTO: ABNORMAL /HPF

## 2021-08-10 PROCEDURE — 81001 URINALYSIS AUTO W/SCOPE: CPT

## 2021-08-12 ENCOUNTER — OFFICE VISIT (OUTPATIENT)
Dept: UROLOGY | Facility: AMBULATORY SURGERY CENTER | Age: 86
End: 2021-08-12
Payer: MEDICARE

## 2021-08-12 VITALS
HEART RATE: 80 BPM | DIASTOLIC BLOOD PRESSURE: 82 MMHG | BODY MASS INDEX: 41.75 KG/M2 | HEIGHT: 73 IN | WEIGHT: 315 LBS | SYSTOLIC BLOOD PRESSURE: 142 MMHG

## 2021-08-12 DIAGNOSIS — R31.29 MICROSCOPIC HEMATURIA: ICD-10-CM

## 2021-08-12 DIAGNOSIS — N40.1 BENIGN PROSTATIC HYPERPLASIA WITH LOWER URINARY TRACT SYMPTOMS, SYMPTOM DETAILS UNSPECIFIED: ICD-10-CM

## 2021-08-12 DIAGNOSIS — N52.9 ERECTILE DYSFUNCTION, UNSPECIFIED ERECTILE DYSFUNCTION TYPE: Primary | ICD-10-CM

## 2021-08-12 PROCEDURE — 99213 OFFICE O/P EST LOW 20 MIN: CPT | Performed by: NURSE PRACTITIONER

## 2021-08-12 NOTE — PROGRESS NOTES
8/12/2021    Katja Tomlinson  1935  [id number]      Assessment  -BPH with lower urinary tract symptoms  -History of gross hematuria, microscopic hematuria  -Erectile dysfunction    Discussion/Plan  Donald Wyman is a 81 y o  male being managed by our office    1  BPH with lower urinary tract symptoms-   Patient reports mild lower urinary tract symptoms  At this time, he is not interested in restarting tamsulosin due to polypharmacy  We reviewed dietary and behavioral modifications including timed and double voiding  2  Microscopic hematuria-   Recent urinalysis with microscopic results identified 2-4 RBC on hpf  He has had no episodes of gross hematuria  Results may be secondary to anticoagulation  He does not wish to pursue invasive testing, but is amenable to a renal ultrasound  His last upper tract imaging was performed in 2017  Will call patient with results  3  Erectile dysfunction-   Discussed management with oral PDE 5 inhibitors  However, will consult with his cardiologist to obtain clearance  If medication is okay to start, will send prescription for sildenafil to his pharmacy  Reviewed mechanism of action, potential side effects, and administration instructions  Plan to follow-up in 1 year for re-evaluation with PVR assessment  However, will be in contact with patient sooner to review ultrasound results  He was instructed to call with any issues   -All questions answered, patient agrees with plan      History of Present Illness  81 y o  male with a history of BPH, microhematuria, ED presents today for follow up  Patient last seen in the office in February 2020  He continues to report weak urinary stream   Patient had previously tried tamsulosin, but discontinue medication due to ineffectiveness  He denies any gross hematuria or dysuria  Patient had episode of gross hematuria over 2 years ago  He remains on coumadin for history of aortic valve replacement    Patient continues to follow under Cardiology  He also reports difficulties achieving an erection  He has never tried an oral PDE 5 inhibitor  Patient denies any additional changes to his overall health  Review of Systems  Review of Systems   Constitutional: Negative  HENT: Negative  Respiratory: Negative  Cardiovascular: Negative  Gastrointestinal: Negative  Genitourinary: Negative for decreased urine volume, difficulty urinating, dysuria, flank pain, frequency, hematuria and urgency  Musculoskeletal: Negative  Skin: Negative  Neurological: Negative  Psychiatric/Behavioral: Negative  AUA SYMPTOM SCORE      Most Recent Value   AUA SYMPTOM SCORE   How often have you had a sensation of not emptying your bladder completely after you finished urinating? 0   How often have you had to urinate again less than two hours after you finished urinating? 5   How often have you found you stopped and started again several times when you urinate?  0   How often have you found it difficult to postpone urination? 3   How often have you had a weak urinary stream?  2   How often have you had to push or strain to begin urination? 1   How many times did you most typically get up to urinate from the time you went to bed at night until the time you got up in the morning?   3   Quality of Life: If you were to spend the rest of your life with your urinary condition just the way it is now, how would you feel about that?  2   AUA SYMPTOM SCORE  14          Past Medical History  Past Medical History:   Diagnosis Date    Anemia     Arthritis     Atrial fibrillation ([facility] )     CHF (congestive heart failure) ([facility] )     Diabetes mellitus ([facility] )     Niddm    DVT (deep vein thrombosis) in pregnancy 1966    not in pregnancy    DVT (deep venous thrombosis) ([place] [address] ) 1966    Dyslipidemia     GERD (gastroesophageal reflux disease)     Hyperlipidemia     Hypertension     Irregular heart beat     Afib    Morbid obesity due to excess calories ([facility] )     Resolved 9/2/2014     Pulmonary embolism (HCC)     Sepsis ([facility] )     Squamous cell skin cancer 07/30/2020    Left posterior scalp    Visual impairment        Past Social History  Past Surgical History:   Procedure Laterality Date    AORTIC VALVE REPLACEMENT      CARDIAC DEFIBRILLATOR PLACEMENT  04/2014   Leslie Winkler CARDIAC SURGERY  02/2014    [facility]    COLONOSCOPY      INSERT / REPLACE / REMOVE PACEMAKER      JOINT REPLACEMENT Left     LTKR    MOHS SURGERY  07/30/2020    Left posterior scalp, Dr Cheyenne Rollins SEP-FEM JUNC Right 8/17/2018    Procedure: LEG PERFORATED INJECTION SCLEROTHERAPY;  Surgeon: Darwin Matute MD;  Location: [facility];  Service: Vascular    TOTAL KNEE ARTHROPLASTY Left     VASCULAR SURGERY      VENA CAVA FILTER PLACEMENT      Interruption inferior vena cava, Josue filter, placement    WISDOM TOOTH EXTRACTION         Past Family History  Family History   Problem Relation Age of Onset    Arthritis Mother     Stroke Mother     Arthritis Father     Cancer Father     Arthritis Daughter        Past Social history  Social History     Socioeconomic History    Marital status: /Civil Union     Spouse name: Not on file    Number of children: Not on file    Years of education: Not on file    Highest education level: Not on file   Occupational History    Not on file   Tobacco Use    Smoking status: Never Smoker    Smokeless tobacco: Never Used   Vaping Use    Vaping Use: Never used   Substance and Sexual Activity    Alcohol use: Not Currently    Drug use: No    Sexual activity: Not on file   Other Topics Concern    Not on file   Social History Narrative    Not on file     Social Determinants of Health     Financial Resource Strain:     Difficulty of Paying Living Expenses:    Food Insecurity:     Worried About Running Out of Food in the Last Year:     [address] in the Last Year:    Transportation Needs:     Lack of Transportation (Medical):      Lack of Transportation (Non-Medical):    Physical Activity:     Days of Exercise per Week:     Minutes of Exercise per Session:    Stress:     Feeling of Stress :    Social Connections:     Frequency of Communication with Friends and Family:     Frequency of Social Gatherings with Friends and Family:     Attends Baptism Services:     Active Member of Clubs or Organizations:     Attends Club or Organization Meetings:     Marital Status:    Intimate Partner Violence:     Fear of Current or Ex-Partner:     Emotionally Abused:     Physically Abused:     Sexually Abused:        Current Medications  Current Outpatient Medications   Medication Sig Dispense Refill    acetaminophen (TYLENOL) 325 mg tablet Take 2 tablets by mouth every 6 (six) hours as needed for mild pain or fever (Patient not taking: Reported on 5/12/2021) 30 tablet 0    albuterol (PROVENTIL HFA,VENTOLIN HFA) 90 mcg/act inhaler INHALE 2 PUFFS BY MOUTH EVERY 6 HOURS AS NEEDED FOR WHEEZING OR SHORTNESS OF BREATH      amoxicillin (AMOXIL) 500 mg capsule TK 4 CS BEFORE 2 HOURS BEFORE DENTAL OR FACIAL SURGERY WORK  3    atorvastatin (LIPITOR) 40 mg tablet Take 40 mg by mouth daily after dinner Atorvastatin Calcium 40 MG Oral Tablet Take 1 tablet daily  Refills: 0  Active       B Complex-C (SUPER B COMPLEX PO) Take 1 capsule by mouth daily       candesartan (ATACAND) 16 mg tablet Take 1 tablet (16 mg total) by mouth daily 90 tablet 3    cholecalciferol (VITAMIN D3) 1,000 units tablet Take 2,000 Units by mouth daily      fluticasone (FLONASE) 50 mcg/act nasal spray 2 sprays as needed Shake liquid and spray       hydrochlorothiazide (HYDRODIURIL) 25 mg tablet Take 25 mg by mouth daily       Iron Combinations (NIFEREX) TABS Take one tablet daily  5    meloxicam (MOBIC) 15 mg tablet Take by mouth daily       metFORMIN (GLUCOPHAGE) 1000 MG tablet Take 1,000 mg by mouth daily after dinner MetFORMIN HCl 1000 MG (MOD) TB24 Take one tablet daily  Refills: 0  Active       Multiple Vitamin (MULTIVITAMINS PO) Take 1 tablet by mouth daily      mupirocin (BACTROBAN) 2 % ointment as needed       omeprazole (PriLOSEC) 20 mg delayed release capsule Omeprazole 20 MG Oral Tablet Delayed Release Take 1 tablet daily  Refills: 0  Active      potassium chloride (K-DUR,KLOR-CON) 20 mEq tablet Take 20 mEq by mouth daily after dinner Potassium Chloride Jesusita ER 20 MEQ Oral Tablet Extended Release Take 1 tablet daily  Refills: 0  Active       sotalol (BETAPACE) 80 mg tablet Take 80 mg by mouth every 12 (twelve) hours   2    torsemide (DEMADEX) 20 mg tablet TAKE 1 TABLET(20 MG) BY MOUTH TWICE DAILY (Patient taking differently: 20 mg daily ) 180 tablet 0    warfarin (COUMADIN) 4 mg tablet Take 1 tablet (4 mg total) by mouth 2 (two) times a week Patient take 4mg everyday but Tuesday and Friday  (Patient taking differently: Take 4 mg by mouth 2 (two) times a week 4 or 6 mg as directed) 90 tablet 0    warfarin (COUMADIN) 6 mg tablet Take 6 mg by mouth The patient takes on Tuesday and Fridays       No current facility-administered medications for this visit  Allergies  No Known Allergies    Past Medical History, Social History, Family History, medications and allergies were reviewed  Vitals  Vitals:    08/12/21 1000   BP: 142/82   Pulse: 80   Weight: (!) 152 kg (336 lb)   Height: 6' 1" (1 854 m)       Physical Exam  Physical Exam  Constitutional:       Appearance: Normal appearance  He is well-developed  He is obese  HENT:      Head: Normocephalic  Eyes:      Pupils: Pupils are equal, round, and reactive to light  Pulmonary:      Effort: Pulmonary effort is normal    Abdominal:      Palpations: Abdomen is soft  Musculoskeletal:         General: Normal range of motion  Cervical back: Normal range of motion  Skin:     General: Skin is warm and dry  Neurological:      General: No focal deficit present  Mental Status: He is alert and oriented to person, place, and time  Psychiatric:         Mood and Affect: Mood normal          Behavior: Behavior normal          Thought Content: Thought content normal          Judgment: Judgment normal          Results    I have personally reviewed all pertinent lab results and reviewed with patient  No results found for: PSA  Lab Results   Component Value Date    GLUCOSE 121 10/10/2015    CALCIUM 9 4 11/05/2020     10/10/2015    K 5 0 11/05/2020    CO2 24 11/05/2020     11/05/2020    BUN 27 (H) 11/05/2020    CREATININE 1 25 11/05/2020     Lab Results   Component Value Date    WBC 7 27 11/05/2020    HGB 12 7 11/05/2020    HCT 38 7 11/05/2020    MCV 94 11/05/2020     11/05/2020     No results found for this or any previous visit (from the past 1 hour(s))

## 2021-08-12 NOTE — Clinical Note
Hello, this is a mutual patient seen in our office today  He would like to start sildenafil for management of ED  I wanted to confirm that this is ok with you first   Thank you

## 2021-08-19 ENCOUNTER — REMOTE DEVICE CLINIC VISIT (OUTPATIENT)
Dept: CARDIOLOGY CLINIC | Facility: CLINIC | Age: 86
End: 2021-08-19
Payer: MEDICARE

## 2021-08-19 DIAGNOSIS — Z95.810 AICD (AUTOMATIC CARDIOVERTER/DEFIBRILLATOR) PRESENT: Primary | ICD-10-CM

## 2021-08-19 PROCEDURE — 93296 REM INTERROG EVL PM/IDS: CPT | Performed by: INTERNAL MEDICINE

## 2021-08-19 PROCEDURE — 93295 DEV INTERROG REMOTE 1/2/MLT: CPT | Performed by: INTERNAL MEDICINE

## 2021-12-28 ENCOUNTER — REMOTE DEVICE CLINIC VISIT (OUTPATIENT)
Dept: CARDIOLOGY CLINIC | Facility: CLINIC | Age: 86
End: 2021-12-28
Payer: MEDICARE

## 2021-12-28 DIAGNOSIS — Z95.810 PRESENCE OF IMPLANTABLE CARDIOVERTER-DEFIBRILLATOR (ICD): Primary | ICD-10-CM

## 2021-12-28 PROCEDURE — 93295 DEV INTERROG REMOTE 1/2/MLT: CPT | Performed by: INTERNAL MEDICINE

## 2021-12-28 PROCEDURE — 93296 REM INTERROG EVL PM/IDS: CPT | Performed by: INTERNAL MEDICINE

## 2022-01-10 ENCOUNTER — OFFICE VISIT (OUTPATIENT)
Dept: CARDIOLOGY CLINIC | Facility: CLINIC | Age: 87
End: 2022-01-10
Payer: MEDICARE

## 2022-01-10 VITALS
DIASTOLIC BLOOD PRESSURE: 60 MMHG | WEIGHT: 315 LBS | BODY MASS INDEX: 41.75 KG/M2 | HEART RATE: 72 BPM | HEIGHT: 73 IN | SYSTOLIC BLOOD PRESSURE: 152 MMHG

## 2022-01-10 DIAGNOSIS — Z95.2 S/P AVR: ICD-10-CM

## 2022-01-10 DIAGNOSIS — I50.33 ACUTE ON CHRONIC DIASTOLIC HEART FAILURE (HCC): ICD-10-CM

## 2022-01-10 DIAGNOSIS — E66.01 MORBID OBESITY (HCC): ICD-10-CM

## 2022-01-10 DIAGNOSIS — Z95.810 PRESENCE OF IMPLANTABLE CARDIOVERTER-DEFIBRILLATOR (ICD): ICD-10-CM

## 2022-01-10 DIAGNOSIS — I47.2 VENTRICULAR TACHYCARDIA (HCC): ICD-10-CM

## 2022-01-10 DIAGNOSIS — I48.0 PAF (PAROXYSMAL ATRIAL FIBRILLATION) (HCC): Primary | ICD-10-CM

## 2022-01-10 DIAGNOSIS — I82.591 CHRONIC DEEP VEIN THROMBOSIS (DVT) OF OTHER VEIN OF RIGHT LOWER EXTREMITY (HCC): ICD-10-CM

## 2022-01-10 PROCEDURE — 93000 ELECTROCARDIOGRAM COMPLETE: CPT | Performed by: INTERNAL MEDICINE

## 2022-01-10 PROCEDURE — 99214 OFFICE O/P EST MOD 30 MIN: CPT | Performed by: INTERNAL MEDICINE

## 2022-01-10 NOTE — PATIENT INSTRUCTIONS
Recommendations:  1  Continue current medications  2  Check BP 9-6Y/DACC - if systolic BP is consistently greater than 140    3  Follow up in 6 months

## 2022-01-10 NOTE — PROGRESS NOTES
Cardiology   Yovana Fair 81 y o  male MRN: [id number]          Impression:  1  s/p aortic valve replacement for endocarditis - doing well  Echo 9/18 Nml LV systolic function and normal bio AVR  2  Hypertension - elevated today  Usually better controlled at home  3  s/p ICD for ventricular tachycardia  4  DVT - on warfarin   5  Paroxysmal atrial fibrillation - in NSR  on Sotalol  On anticoagulation    6  Chronic diastolic heart failure - compensated      Recommendations:  1  Continue current medications  2  Check BP 2-6M/NOFN - if systolic BP is consistently greater than 140    3  Follow up in 6 months  HPI: Yovana Fair is a 81y o  year old male with morbid obesity, hypertension, recent aortic valve replacement for endocarditis who returns for follow up   Asymptomatic from a cardiac standpoint - no chest pain, shortness of breath, or palpitations  Underwent R TKR 11/21  Does get some dyspnea on exertion  No chest pain, palpitations  Some RLE edema  Review of Systems   Constitutional: Negative  HENT: Negative  Eyes: Negative  Respiratory: Positive for shortness of breath  Negative for chest tightness  Cardiovascular: Positive for leg swelling  Negative for chest pain and palpitations  Gastrointestinal: Negative  Endocrine: Negative  Genitourinary: Negative  Musculoskeletal: Negative  Skin: Negative  Allergic/Immunologic: Negative  Neurological: Negative  Hematological: Negative  Psychiatric/Behavioral: Negative  All other systems reviewed and are negative          Past Medical History:   Diagnosis Date    Anemia     Arthritis     Atrial fibrillation ([facility] )     CHF (congestive heart failure) ([facility])     Diabetes mellitus ([facility] )     Niddm    DVT (deep vein thrombosis) in pregnancy 1966    not in pregnancy    DVT (deep venous thrombosis) ([place] [address] ) 1966    Dyslipidemia     GERD (gastroesophageal reflux disease)     Hyperlipidemia     Hypertension     Irregular heart beat     Afib    Morbid obesity due to excess calories ([facility] )     Resolved 9/2/2014     Pulmonary embolism (HCC)     Sepsis ([facility] )     Squamous cell skin cancer 07/30/2020    Left posterior scalp    Visual impairment      Past Surgical History:   Procedure Laterality Date    AORTIC VALVE REPLACEMENT      CARDIAC DEFIBRILLATOR PLACEMENT  04/2014   Eduin Murray CARDIAC SURGERY  02/2014    AVR    COLONOSCOPY      INSERT / REPLACE / REMOVE PACEMAKER      JOINT REPLACEMENT Left     LTKR    MOHS SURGERY  07/30/2020    Left posterior scalp, Dr Ryder Cornejo SEP-FEM JUNC Right 8/17/2018    Procedure: LEG PERFORATED INJECTION SCLEROTHERAPY;  Surgeon: Flori Shook MD;  [facility];  Service: Vascular    TOTAL KNEE ARTHROPLASTY Left     VASCULAR SURGERY      VENA CAVA FILTER PLACEMENT      Interruption inferior vena cava, [place] filter, placement    WISDOM TOOTH EXTRACTION       Social History     Substance and Sexual Activity   Alcohol Use Not Currently     Social History     Substance and Sexual Activity   Drug Use No     Social History     Tobacco Use   Smoking Status Never Smoker   Smokeless Tobacco Never Used     Family History   Problem Relation Age of Onset    Arthritis Mother     Stroke Mother     Arthritis Father     Cancer Father     Arthritis Daughter        Allergies:   Allergies   Allergen Reactions    Tramadol Other (See Comments)     intolerance       Medications:     Current Outpatient Medications:     albuterol (PROVENTIL HFA,VENTOLIN HFA) 90 mcg/act inhaler, INHALE 2 PUFFS BY MOUTH EVERY 6 HOURS AS NEEDED FOR WHEEZING OR SHORTNESS OF BREATH, Disp: , Rfl:     amoxicillin (AMOXIL) 500 mg capsule, TK 4 CS BEFORE 2 HOURS BEFORE DENTAL OR FACIAL SURGERY WORK, Disp: , Rfl: 3    atorvastatin (LIPITOR) 40 mg tablet, Take 40 mg by mouth daily after dinner Atorvastatin Calcium 40 MG Oral Tablet Take 1 tablet daily  Refills: 0  Active , Disp: , Rfl:     B Complex-C (SUPER B COMPLEX PO), Take 1 capsule by mouth daily , Disp: , Rfl:     candesartan (ATACAND) 16 mg tablet, Take 1 tablet (16 mg total) by mouth daily, Disp: 90 tablet, Rfl: 3    cholecalciferol (VITAMIN D3) 1,000 units tablet, Take 2,000 Units by mouth daily, Disp: , Rfl:     fluticasone (FLONASE) 50 mcg/act nasal spray, 2 sprays as needed Shake liquid and spray , Disp: , Rfl:     hydrochlorothiazide (HYDRODIURIL) 25 mg tablet, Take 25 mg by mouth daily , Disp: , Rfl:     Iron Combinations (NIFEREX) TABS, Take one tablet daily, Disp: , Rfl: 5    metFORMIN (GLUCOPHAGE) 1000 MG tablet, Take 1,000 mg by mouth daily after dinner MetFORMIN HCl 1000 MG (MOD) TB24 Take one tablet daily  Refills: 0  Active , Disp: , Rfl:     Multiple Vitamin (MULTIVITAMINS PO), Take 1 tablet by mouth daily, Disp: , Rfl:     mupirocin (BACTROBAN) 2 % ointment, as needed , Disp: , Rfl:     omeprazole (PriLOSEC) 20 mg delayed release capsule, Omeprazole 20 MG Oral Tablet Delayed Release Take 1 tablet daily  Refills: 0  Active, Disp: , Rfl:     potassium chloride (K-DUR,KLOR-CON) 20 mEq tablet, Take 20 mEq by mouth daily after dinner Potassium Chloride Jesusita ER 20 MEQ Oral Tablet Extended Release Take 1 tablet daily  Refills: 0  Active , Disp: , Rfl:     sotalol (BETAPACE) 80 mg tablet, Take 80 mg by mouth every 12 (twelve) hours , Disp: , Rfl: 2    torsemide (DEMADEX) 20 mg tablet, TAKE 1 TABLET(20 MG) BY MOUTH TWICE DAILY (Patient taking differently: 20 mg daily ), Disp: 180 tablet, Rfl: 0    warfarin (COUMADIN) 4 mg tablet, Take 1 tablet (4 mg total) by mouth 2 (two) times a week Patient take 4mg everyday but Tuesday and Friday   (Patient taking differently: Take 4 mg by mouth 2 (two) times a week 4 or 6 mg as directed), Disp: 90 tablet, Rfl: 0    warfarin (COUMADIN) 6 mg tablet, Take 6 mg by mouth The patient takes on Tuesday and Fridays, Disp: , Rfl:     acetaminophen (TYLENOL) 325 mg tablet, Take 2 tablets by mouth every 6 (six) hours as needed for mild pain or fever (Patient not taking: Reported on 5/12/2021), Disp: 30 tablet, Rfl: 0    meloxicam (MOBIC) 15 mg tablet, Take by mouth daily  (Patient not taking: Reported on 1/10/2022 ), Disp: , Rfl:       Wt Readings from Last 3 Encounters:   01/10/22 (!) 150 kg (330 lb 14 4 oz)   08/12/21 (!) 152 kg (336 lb)   07/23/21 (!) 159 kg (351 lb)     Temp Readings from Last 3 Encounters:   07/23/21 97 6 °F (36 4 °C) (Tympanic)   11/05/20 98 2 °F (36 8 °C)   10/12/20 (!) 96 8 °F (36 °C) (Tympanic)     BP Readings from Last 3 Encounters:   01/10/22 152/60   08/12/21 142/82   07/23/21 140/80     Pulse Readings from Last 3 Encounters:   01/10/22 72   08/12/21 80   07/23/21 71         Physical Exam  Constitutional:       Appearance: He is obese  HENT:      Head: Atraumatic  Mouth/Throat:      Mouth: Mucous membranes are moist    Eyes:      Extraocular Movements: Extraocular movements intact  Cardiovascular:      Rate and Rhythm: Normal rate and regular rhythm  Heart sounds: Normal heart sounds  Pulmonary:      Effort: Pulmonary effort is normal       Breath sounds: Normal breath sounds  Abdominal:      General: Abdomen is flat  Musculoskeletal:         General: Normal range of motion  Cervical back: Normal range of motion  Skin:     General: Skin is warm  Neurological:      General: No focal deficit present  Mental Status: He is alert and oriented to person, place, and time     Psychiatric:         Mood and Affect: Mood normal          Behavior: Behavior normal            Laboratory Studies:  CMP:  Lab Results   Component Value Date     10/10/2015    K 5 0 11/05/2020     11/05/2020    CO2 24 11/05/2020    ANIONGAP 8 10/10/2015    BUN 27 (H) 11/05/2020    CREATININE 1 25 11/05/2020    GLUCOSE 121 10/10/2015    AST 22 06/29/2020    ALT 27 06/29/2020    BILITOT 2 23 (H) 10/10/2015    EGFR 53 11/05/2020       Cardiac testing:   EKG reviewed personally: NSR 72 1st deg AV block LBBB  Results for orders placed during the hospital encounter of 20    Echo complete with contrast if indicated    Narrative  [address]  [address] [facility], [address] [place] [address]  [phone]    Transthoracic Echocardiogram  2D, M-mode, Doppler, and Color Doppler    Study date:  2020    Patient: Dianne Alexis  MR number: [id number]  Account number: [de-identified]  : 1935  Age: 81 years  Gender: Male  Status: Outpatient  Location: [address] [place] [address] [facility] [place]  Height: 73 in  Weight: 351 3 lb  BP: 152/ 73 mmHg    Indications: PAF; Acute on chronic diastolic HF;s/p AVR  Diagnoses: I35 9 - Nonrheumatic aortic valve disorder, unspecified, I48 0 - Atrial fibrillation, I50 9 - Heart failure, unspecified    Sonographer:  ABBY Sheikh  Interpreting Physician:  Fabian Munguia MD  Primary Physician:  Meryl Taylor DO  Referring Physician:  Cirilo Braswell MD  Group:  [id number]  Cardiology Fellow:  Sherlyn Cushing, DO    SUMMARY    PROCEDURE INFORMATION:  This was a technically difficult study  Echocardiographic views were limited due to restricted patient mobility, poor patient compliance, poor acoustic window availability, decreased penetration, and lung interference  LEFT VENTRICLE:  Systolic function was normal  Ejection fraction was estimated to be 55 %  Although no diagnostic regional wall motion abnormality was identified, this possibility cannot be completely excluded on the basis of this study  Wall thickness was mildly to moderately increased  There was mild concentric hypertrophy  RIGHT VENTRICLE:  The ventricle was mildly dilated  Wall thickness was mildly increased  LEFT ATRIUM:  The atrium was mildly to moderately dilated  RIGHT ATRIUM:  The atrium was mildly to moderately dilated  MITRAL VALVE:  There was mild to moderate annular calcification    There was mild regurgitation  AORTIC VALVE:  A bioprosthesis was present  It exhibited normal function  The peak valve velocity was 197 cm/s  The mean valve velocity was 152 cm/s  Valve peak gradient was 16 mmHg  Valve mean gradient was 9 mmHg  Estimated aortic valve area (by VTI) was 2 16 cmï¾²  TRICUSPID VALVE:  There was mild regurgitation  Pulmonary artery systolic pressure was mildly increased  Estimated peak PA pressure was 40 mmHg  PULMONIC VALVE:  There was mild regurgitation  HISTORY: PRIOR HISTORY: HTN;s/p ICD;VT;SOB; Morbid obesity  PROCEDURE: The study was performed in the [address]  This was a routine study  The transthoracic approach was used  The study included complete 2D imaging, M-mode, complete spectral Doppler, and color Doppler  The  heart rate was 67 bpm, at the start of the study  Images were obtained from the parasternal, apical, subcostal, and suprasternal notch acoustic windows  Echocardiographic views were limited due to restricted patient mobility, poor patient  compliance, poor acoustic window availability, decreased penetration, and lung interference  This was a technically difficult study  LEFT VENTRICLE: Size was normal  Systolic function was normal  Ejection fraction was estimated to be 55 %  Although no diagnostic regional wall motion abnormality was identified, this possibility cannot be completely excluded on the basis  of this study  Wall thickness was mildly to moderately increased  There was mild concentric hypertrophy  RIGHT VENTRICLE: The ventricle was mildly dilated  Systolic function was low normal  Wall thickness was mildly increased  A pacing wire was present in the ventricular cavity  LEFT ATRIUM: The atrium was mildly to moderately dilated  RIGHT ATRIUM: The atrium was mildly to moderately dilated  MITRAL VALVE: There was mild to moderate annular calcification  There was normal leaflet separation   DOPPLER: The transmitral velocity was within the normal range  There was no evidence for stenosis  There was mild regurgitation  AORTIC VALVE: A bioprosthesis was present  It exhibited normal function  TRICUSPID VALVE: The valve structure was normal  There was normal leaflet separation  DOPPLER: The transtricuspid velocity was within the normal range  There was no evidence for stenosis  There was mild regurgitation  Pulmonary artery  systolic pressure was mildly increased  Estimated peak PA pressure was 40 mmHg  PULMONIC VALVE: Leaflets exhibited normal thickness, no calcification, and normal cuspal separation  DOPPLER: The transpulmonic velocity was within the normal range  There was mild regurgitation  PERICARDIUM: There was no pericardial effusion  AORTA: The root exhibited normal size  SYSTEMIC VEINS: IVC: The inferior vena cava was not well visualized      MEASUREMENT TABLES    2D MEASUREMENTS  LVOT   (Reference normals)  Diam   23 mm   (--)    DOPPLER MEASUREMENTS  LVOT   (Reference normals)  Peak annie   101 cm/s   (--)  Mean annie   70 cm/s   (--)  VTI   23 cm   (--)  Peak gradient   4 mmHg   (--)  Mean gradient   2 2 mmHg   (--)  Stroke vol   95 56 ml   (--)  Aortic valve   (Reference normals)  Peak annie   197 cm/s   (--)  Mean annie   152 cm/s   (--)  VTI   44 cm   (--)  Peak gradient   16 mmHg   (--)  Mean gradient   9 mmHg   (--)  Obstr index, VTI   0 52    (--)  Valve area, VTI   2 16 cmï¾²   (--)  Area index, VTI   0 79 cmï¾²/mï¾²   (--)  Obstr index, Vmax   0 51    (--)  Valve area, Vmax   2 12 cmï¾²   (--)  Area index, Vmax   0 77 cmï¾²/mï¾²   (--)  Obstr index, Vmean   0 46    (--)  Valve area, Vmean   1 91 cmï¾²   (--)  Area index, Vmean   0 7 cmï¾²/mï¾²   (--)    SYSTEM MEASUREMENT TABLES    2D  %FS: 22 82 %  Ao Diam: 2 49 cm  EDV(Teich): 57 38 ml  EF(Teich): 46 66 %  ESV(Teich): 30 61 ml  IVSd: 1 96 cm  LA Area: 26 79 cm2  LA Diam: 4 97 cm  LVEDV MOD A4C: 112 1 ml  LVEF MOD A4C: 50 26 %  LVESV MOD A4C: 55 76 ml  LVIDd: 3 68 cm  LVIDs: 2 84 cm  LVLd A4C: 8 11 cm  LVLs A4C: 6 99 cm  LVOT Diam: 2 25 cm  LVPWd: 1 98 cm  RA Area: 24 17 cm2  RVIDd: 4 17 cm  SV MOD A4C: 56 34 ml  SV(Teich): 26 78 ml    CW  AV Env  Ti: 296 86 ms  AV VTI: 39 2 cm  AV Vmax: 2 1 m/s  AV Vmean: 1 32 m/s  AV maxP 68 mmHg  AV meanP 71 mmHg  TR Vmax: 2 85 m/s  TR maxP 45 mmHg    MM  TAPSE: 1 69 cm    PW  KISHA (VTI): 2 34 cm2  KISHA Vmax: 1 91 cm2  AVAI (VTI): 0 cm2/m2  AVAI Vmax: 0 cm2/m2  E' Sept: 0 06 m/s  E/E' Sept: 19 55  LVOT Env  Ti: 327 31 ms  LVOT VTI: 22 97 cm  LVOT Vmax: 1 01 m/s  LVOT Vmean: 0 7 m/s  LVOT maxP 04 mmHg  LVOT meanP 24 mmHg  LVSI Dopp: 33 45 ml/m2  LVSV Dopp: 91 66 ml  MV A Juan: 1 43 m/s  MV Dec Hendry: 5 21 m/s2  MV DecT: 213 91 ms  MV E Juan: 1 11 m/s  MV E/A Ratio: 0 78  MV PHT: 62 03 ms  MVA By PHT: 3 55 cm2    [facility] Accredited Echocardiography Laboratory    Prepared and electronically signed by    Michel Man MD  Signed  10:03:59    No results found for this or any previous visit  No results found for this or any previous visit  No results found for this or any previous visit

## 2022-03-29 ENCOUNTER — REMOTE DEVICE CLINIC VISIT (OUTPATIENT)
Dept: CARDIOLOGY CLINIC | Facility: CLINIC | Age: 87
End: 2022-03-29
Payer: MEDICARE

## 2022-03-29 DIAGNOSIS — Z95.810 PRESENCE OF IMPLANTABLE CARDIOVERTER-DEFIBRILLATOR (ICD): Primary | ICD-10-CM

## 2022-03-29 PROCEDURE — 93296 REM INTERROG EVL PM/IDS: CPT | Performed by: INTERNAL MEDICINE

## 2022-03-29 PROCEDURE — 93295 DEV INTERROG REMOTE 1/2/MLT: CPT | Performed by: INTERNAL MEDICINE

## 2022-03-29 NOTE — PROGRESS NOTES
Results for orders placed or performed in visit on 03/29/22   Cardiac EP device report    Narrative    MDT DUAL CHAMBER ICD/ NOT MRI CONDITIONAL  CARELINK TRANSMISSION: BATTERY VOLTAGE ADEQUATE (3 3 YRS)  AP 0 4%  0 1% (AAI-DDD 50)  ALL AVAILABLE LEAD PARAMETERS WITHIN NORMAL LIMITS  NO SIGNIFICANT HIGH RATE EPISODES  PATIENT ON WARFARIN, SOTALOL  NORMAL DEVICE FUNCTION     ES

## 2022-04-04 ENCOUNTER — OFFICE VISIT (OUTPATIENT)
Dept: DERMATOLOGY | Facility: CLINIC | Age: 87
End: 2022-04-04
Payer: MEDICARE

## 2022-04-04 ENCOUNTER — TELEPHONE (OUTPATIENT)
Dept: DERMATOLOGY | Facility: CLINIC | Age: 87
End: 2022-04-04

## 2022-04-04 VITALS — TEMPERATURE: 98.2 F | HEIGHT: 73 IN | WEIGHT: 315 LBS | BODY MASS INDEX: 41.75 KG/M2

## 2022-04-04 DIAGNOSIS — C44.311 BASAL CELL CARCINOMA (BCC) OF SKIN OF NOSE: Primary | ICD-10-CM

## 2022-04-04 PROCEDURE — 99213 OFFICE O/P EST LOW 20 MIN: CPT | Performed by: DERMATOLOGY

## 2022-04-04 RX ORDER — GABAPENTIN 100 MG/1
100 CAPSULE ORAL DAILY
COMMUNITY
Start: 2022-01-25

## 2022-04-04 RX ORDER — CANDESARTAN CILEXETIL AND HYDROCHLOROTHIAZIDE 16; 12.5 MG/1; MG/1
1 TABLET ORAL DAILY
COMMUNITY
Start: 2022-03-19 | End: 2022-05-09 | Stop reason: HOSPADM

## 2022-04-04 RX ORDER — HYDROCODONE BITARTRATE AND ACETAMINOPHEN 5; 325 MG/1; MG/1
1 TABLET ORAL EVERY 6 HOURS PRN
COMMUNITY
Start: 2022-01-25

## 2022-04-04 RX ORDER — POLYETHYLENE GLYCOL 3350 17 G/17G
17 POWDER, FOR SOLUTION ORAL 2 TIMES DAILY
COMMUNITY

## 2022-04-04 NOTE — PROGRESS NOTES
MOHS CONSULTATION    Referring Physician: Dr Alondra Fleming  Biopsy Location: Tip of nose  Resulting Laboratory:   Case number: [id number]   Biopsy diagnosis: Basal Cell Carcinoma, superficial/nodular type  Patient on blood thinners: Yes, coumadin      Reviewed directly with patient treatment options, specifically Mohs  Discussed possible surgical complications and alternatives  All questions were answered in full  Objective:     CONSTITUTIONAL:   Vitals:    04/04/22 1423   Temp: 98 2 °F (36 8 °C)       PSYCH: Normal mood and affect  EYES: Normal conjunctiva  ENT: Normal lips and oral mucosa  CARDIOVASCULAR: No edema  RESPIRATORY: Normal respirations  HEME/LYMPH/IMMUNO:  No regional lymphadenopathy except as noted below in "ASSESSMENT AND PLAN BY DIAGNOSIS"     SKIN:  FOCUSED ORGAN SYSTEM EXAM  Face Normal except as noted below in Assessment       BASAL CELL CARCINOMA    Physical Exam:   Anatomic Location Affected: Tip of nose   Morphological Description:  0 7 cm x 0 7 cm pink macule    Additional History of Present Condition:  Previous accession #[id number] by Dr Felicita Mirza and Plan:  Based on a thorough discussion of this condition and the management approach to it (including a comprehensive discussion of the known risks, side effects and potential benefits of treatment), the patient (family) agrees to implement the following specific plan:   Schedule Mohs      Pre- operative Mohs Scheduling Note    Do you have a pacemaker or defibrillator? yes: Defibrillator and no    Do you take antibiotics before skin or dental procedures? yes: Amoxicillin and no  If yes, will likely require pre-operative antibiotics  Ask  the patient why they take the antibiotics (usually because of joint replacement)  Do you have a history of a joint replacements within the past 2 years? yes: Bilateral knees and no   If yes, will likely require pre-operative antibiotics   Ask if orthopaedic surgeon has prescribed pre-operative antibiotics to take before procedures/dental work?    Patient states he has amoxicillin at home, advised to sonja if he needs some    Do you take any OTC medications that thin your blood (Aspirin, Aleve, Ibuprofen) or supplements that thin your blood (fish oil, garlic, vitamin E, Ginko Biloba)? no    Do you take any prescribed medications that thin your blood (Coumadin, Plavix, Xarelto, Eliquis or another prescribed blood thinner)? yes: Coumadin and no    Do you have an allergy to lidocaine or epinephrine? no    Do you have an allergy to shellfish? no    Do you smoke? no      If yes,  patient to try and stop 2 days before surgery and 7 days after the surgery  Minimizing smoking as much as possible during this time will improve healing and the cosmetic result after surgery  Date scheduled: 4/18/22 at 11:00 with Dr Hunetr Cadena of Care with other provider (Abdoul Wahl, ENT) required? no   IF YES, PLEASE FORWARD TO APPROPRIATE PERSONNEL TO HELP COORDINATE  Are there remaining tumors to be scheduled? no    BCC on tip of nose, discussed treatment options in detail with the patient and will plan on mohs  Patient is familiar with the procedure and the site does qualify for mohs  Discussed plastics closure but patient prefers everything done together  Will schedule for next available    Scribe Attestation    I,:  Juan Antonio Arreola MA am acting as a scribe while in the presence of the attending physician :       I,:  Katerina Santos MD personally performed the services described in this documentation    as scribed in my presence :

## 2022-04-04 NOTE — TELEPHONE ENCOUNTER
Telephone call to patient to advise patient to get a PT/INR  Patient will get Pt/INR 1 week prior to Mohs, Patient has Amoxicillin at home and will take 1 hour prior to surgery  patient reports that he has a pacemake and a defibrillator

## 2022-04-04 NOTE — PATIENT INSTRUCTIONS
--PRE-OPERATIVE INSTRUCTIONS - MOHS    Before your scheduled surgery, there are a number of important precautions and positive steps you should take to help prepare yourself for a successful treatment and speedy recovery  Some of the steps, which are listed below, may seem unnecessary and inconvenient, but they are important  For example, when you stop smoking, you increase your ability to heal  Occasionally, there may be valid reasons, personal or medical, why you can't comply  In such cases, please call the office so we can discuss possible ways to overcome any obstacles you may be encountering  If you have any questions about the surgery, or remember additional medical information that you forgot to mention to our staff, please contact the office prior to your surgery  GENERAL INFORMATION REGARDING MOHS MICROGRAPHIC SURGERY    Mohs surgery is a specialized technique for the removal of skin cancer developed by Dr Dannette Beach Mohs over 50 years ago to improve the cure rates of skin cancer  Traditionally, skin cancers are treated by destructive methods (radiation, freezing, scraping, and burning) or excision (cutting out the tissue with standards margins and sending it to an outside laboratory for testing)  These methods all yield cure rates between 65%-94%  However, for cancers located in cosmetically sensitive areas, large tumors, or tumors unsuccessfully treated by other means, Mohs surgery offers a higher cure rate  In most cases, Mohs surgery provides you with a 99% cure rate for primary (previously untreated) basal cell cancer and a 95% cure rate for primary squamous cell cancer  In Mohs surgery, tissue is removed and processed in a way that we are able to check 100% of the margins, giving the highest cure rate for any method of treating skin cancers while providing maximal preservation of normal skin   This allows the surgeon to produce an optimal cosmetic result for the patient by maximizing the amount of tissue removed yielding as small a scar as possible    On the day of surgery, you will be given local anesthesia only (similar to what was given to you during your initial biopsy)  You will remain awake  You will verify the location of the skin cancer prior to the onset of the surgery  Once the area is numb, the tissue containing the skin cancer will be removed, taking a small safety margin  This margin is usually smaller than what would be taken with a standard excision  Once the tissue is removed, it is marked and oriented  The first layer (Stage I) will be processed in our laboratory  The wound will be treated for bleeding and a bandage will be placed to keep you comfortable while you wait an approximate 45 minutes-1 hour (for the processing of the tissue) in your room  Your Mohs surgeon will examine the pathology in the lab, checking all the margins  If any tumor remains, you will need to take a second layer of skin (Stage 2)  The area will be re-anesthetized and your Mohs surgeon will remove more skin only in the area where the tumor exists  This process will continue until all the skin cancer is removed  Unfortunately, there is no method to predict how many layers or stages will be taken  Once the tumor has been removed completely, we will discuss the best ways to close the defect  Most wounds may be closed with stitches  A larger wound may require a skin graft or a flap  In rare instances, especially for cancers around the eye or for larger cancers, we may work with another surgeon (oculoplastic, ENT, plastics) with special skills to assist with reconstruction  Medications: Please take all your normal medications the morning of your surgery  If you are a diabetic, please bring your insulin or medications with you, as well as a snack to avoid having low blood sugar during your day with us  1)  Blood Thinners  ;  Most people should stop all aspirins, aspirin-containing medications (Pily-Tippo, Anacin, Ecotrin, etc), and non-steroidal anti-inflammatory medications (Motrin, Naproxen, Advil, Midol, Aleve, etc ) for 7 days prior to your scheduled surgery and 2 days after (unless instructed otherwise after surgery)  You may take Tylenol for pain  ; VERY IMPORTANT: If you take aspirin because you have had a stroke, heart attack, heart disease, other condition, or your physician has prescribed you to take it, please continue your aspirin     ; Ask the doctor (that prescribed the medication) if prior to surgery you should stop your prescribed blood thinners, such as Coumadin/Warfarin, Plavix, or Aggrenox  NEVER stop them without your doctor's permission or knowledge  If you have had a stroke, heart attack, or have an irregular heartbeat, your doctor may want you to continue your medication  We can still do your surgery  You may have more bruising  2) Antibiotics  ; If you usually require antibiotics prior to dental work, please let the office know at least 24 hours prior to your surgery  Medical conditions that sometimes require preoperative antibiotics include artificial heart valves, heart murmurs, artificial joints, and related problems  We will give you a different medication than the dentist, so please contact us for the correct antibiotic   ; If you were prescribed pre-operative antibiotics by our office, please take the medication 2 hours prior to your procedure  3) Vitamins and Supplements  ; Avoid taking any supplements with Vitamin E, Fish Oil, Gingko, Ginseng, and Garlic for 2 weeks before and 2 days after your surgery  These thin your blood    Alcohol: Avoid drinking alcohol for 2 days prior to your surgery, and for 2 days afterwards (it thins the blood and causes more bruising and swelling)  Smoking: Try to STOP or reduce smoking significantly the week before your surgery, and especially the week afterwards (it greatly improves how well you heal)   Tobacco smoke deprives the blood of oxygen, which is urgently needed by the wound during the healing process  Contact Lenses: Do not wear them on the day of the surgery  Instead, wear glasses and bring your case, in case we need to remove them  Clothing: Do not wear your nicest clothing on your surgery day  We recommend wearing a button down shirt that will not disrupt your post-operative dressing when changing later that night  Bathing: On the morning of your surgery, you may bathe or shower normally  If you get your hair done on a weekly basis, remember to get your hair washed the day before surgery  You will need to keep your surgical site dry for a minimum of 48 hours  Makeup: If your surgery is on the face, please do not wear any makeup on the day of the surgery  Jewelry: Please try to avoid wearing jewelry on the day of surgery  Food: On the morning of surgery, have breakfast but limit your intake of caffeinated beverages  They are diuretic and may inconvenience you during surgery  If you are following up with another surgeon the same day as your Mohs surgery, you must receive permission to eat breakfast from that surgeon  What to bring with you on the day of your surgery:  ; Bring snacks - Since you could be at the office long, you may bring snacks and/or lunch with you  Some snacks and drinks are available at the office as well    ; Bring a sweater - Bring a sweater or jacket that buttons or zips down the front and will not disturb your wound dressing during removal   ; Bring something to do - You will be spending much of the day in our office  There will be 45-60 minute waiting periods  between layers/stages, and while there is a television with cable in every room, it is nice to have something to keep you occupied such as books, magazines, knitting, music, or work       Planning Ahead:  ; Other Appointments - It is important to realize that no matter how small the skin cancer appears to be, looks can be deceiving  Since your surgery may last the entire day, you should not schedule any other appointments that day   ; Special Occasions - Surgery often creates swelling and bruising  Also, the post-op dressing may be rather large and obvious  Keep this in mind as you arrange your social/work schedule  If an important event is already planned, please check with your referring physician or your Mohs surgeon to see if the surgery can be postponed  ; Activity Limits after Surgery - If surgery was performed on your face, we recommend that you keep your activity level to a minimum for 2-3 days (the blood pressure elevation related to exercise can lead to bleeding)  If you have stitches in an area that will be under tension or significant movement (neck, back, arms, legs), you will need to avoid heavy lifting (anything over 5 lbs) or exercise for at least 2 weeks and possibly longer  We also advise that you limit out of town travel for the first 7 days after surgery  You should also wait at least 7 days before going into a pool or the ocean   ; Housework - Since you will need to minimize activity after surgery, plan to do your groceries, laundry, gardening, and other heavy household chores prior to your surgery  Please make arrangements for assistance during the post-op period  If surgery is around your mouth area, you may need to eat soft foods, such as soup, milkshakes, or yogurt for 48 hours  Purchasing bandage supplies: Prior to surgery, please purchase the following items to care for your surgical wound properly   ; Cotton swabs (Q-tips)  ; Vaseline or Aquaphor  ; Telfa pads (or any non-stick dressing)  ; Paper tape or Hypafix tape  ; Gauze pads (3x3)      We will provide you with some bandage supplies after surgery to get you started  Transportation: It is often reassuring and comforting to have a  drive you to and from the surgery  He or she is welcome to wait in the office during the surgery  Please note that for safety reasons, only the patient is allowed in the procedure room during surgery  Thank you for your cooperation  Rescheduling: If you need to reschedule your surgery, please notify the office as soon as possible

## 2022-04-12 NOTE — TELEPHONE ENCOUNTER
Pt called and asked to speak with Aixa regarding his blood test results and if he should take Amoxicillin prior to surgery  Sent message to [facility] to contact Pt back

## 2022-04-13 NOTE — TELEPHONE ENCOUNTER
Return call to patient  Patient advised me his INR is 2 9  Advised him to stay on the warfarin  Also advised patient to take preop Amoxicillin because his right knee was replaced 11/15/2021

## 2022-04-18 ENCOUNTER — PROCEDURE VISIT (OUTPATIENT)
Dept: DERMATOLOGY | Facility: CLINIC | Age: 87
End: 2022-04-18
Payer: MEDICARE

## 2022-04-18 VITALS
SYSTOLIC BLOOD PRESSURE: 154 MMHG | OXYGEN SATURATION: 98 % | RESPIRATION RATE: 18 BRPM | TEMPERATURE: 97.8 F | HEART RATE: 76 BPM | DIASTOLIC BLOOD PRESSURE: 67 MMHG | HEIGHT: 73 IN | WEIGHT: 315 LBS | BODY MASS INDEX: 41.75 KG/M2

## 2022-04-18 DIAGNOSIS — C44.311 BASAL CELL CARCINOMA (BCC) OF SKIN OF NOSE: Primary | ICD-10-CM

## 2022-04-18 PROCEDURE — 17311 MOHS 1 STAGE H/N/HF/G: CPT | Performed by: DERMATOLOGY

## 2022-04-18 PROCEDURE — 17312 MOHS ADDL STAGE: CPT | Performed by: DERMATOLOGY

## 2022-04-18 PROCEDURE — 12051 INTMD RPR FACE/MM 2.5 CM/<: CPT | Performed by: DERMATOLOGY

## 2022-04-18 NOTE — PATIENT INSTRUCTIONS
Mohs Microscopic Surgery After Care    WOUND CARE AFTER SURGERY:    1  Do NOT to remove the pressure bandage for 48 hours  Keep the area clean and dry while this bandage is on  2  After removing the bandage for the first time, gently clean the area with soap and water  If the bandage is difficult to remove, getting the bandage wet in the shower will sometimes help soften the adhesive and allow it to be removed more easily  3  You will now need to cleanse this area daily in the shower with gentle soap  There is no need to scrub the area  Apply plain Vaseline ointment (this is over the counter and not a prescription) to the site followed by a clean appropriately sized bandage to area  Non stick dressing and paper tape (or Hypafix) are recommended for sensitive skin but a bandaid is fine if it covers the area well  4  You will need to continue the above daily wound care until you return for suture removal in 7 days (generally 7 days for the face, 10-14 days off the face)      RESTRICTIONS:     For two DAYS:   - You will need to take it very easy as this time is highest risk for bleeding  Being a "couch potato" during these two days is generally recommended  - For surgeries on the face/neck/scalp: Avoid leaning down to pick things up off the floor as this brings blood up to your head  Instead, squat down to pick things up  For two WEEKS:   - No heavy lifting (anything greater than 10 pounds)   - You can start to do slow, gentle activities such as slow walking but nothing to increase your heart rate and blood pressure too much (such as cardiovascular exercise)  It is important to take it easy as there is still a risk for bleeding and a high risk popping of stitches open during this time  If we did surgery near the eyes (including the nose, forehead, front part of your scalp, cheeks): It is VERY common to get a large amount of swelling around your eyes (puffy eyes)   Although less frequent, this can be enough to swell your eyes shut and can also come along with bruising  This should not hurt and is very expected and normal  It is typically worst at ~ 3 days out from your surgery and dramatically better 1 week post-operatively  If we did surgery around your nose: No blowing your nose as this puts you at higher risk of popping stitches durign this time  Instead dab under your nose with a tissue or use a Q-tip inside your nose  MANAGING YOUR PAIN AFTER SURGERY     You can expect to have some pain after surgery  This is normal  The pain is typically worse the first two days after surgery, and quickly begins to get better  The best strategy for controlling your pain after surgery is around the clock pain control  You can take over the counter Acetaminophen (Tylenol) for discomfort, if no contraindications  If you are taking this at the maximum dose, you can alternate this with Motrin (ibuprofen or Advil) as well  Alternating these medications with each other allows you to maximize your pain control  In addition to Tylenol and Motrin, you can use heating pads or ice packs on your incisions to help reduce your pain  How will I alternate your regular strength over-the-counter pain medication? You will take a dose of pain medication every three hours   Start by taking 650 mg of Tylenol (2 pills of 325 mg)   3 hours later take 600 mg of Motrin (3 pills of 200 mg)   3 hours after taking the Motrin take 650 mg of Tylenol   3 hours after that take 600 mg of Motrin  See example - if your first dose of Tylenol is at 12:00 PM     12:00 PM  Tylenol 650 mg (2 pills of 325 mg)    3:00 PM  Motrin 600 mg (3 pills of 200 mg)    6:00 PM  Tylenol 650 mg (2 pills of 325 mg)    9:00 PM  Motrin 600 mg (3 pills of 200 mg)    Continue alternating every 3 hours      Important:   Do not take more than 4000mg of Tylenol or 3200mg of Motrin in a 24-hour period  What if I still have pain?    If you have pain that is not controlled with the over-the-counter pain medications (Tylenol and Motrin or Advil), don't hesitate to call our staff using the number provided  We will help make sure you are managing your pain in the best way possible, and if necessary, we can provide a prescription for additional pain medication  CALL OUR OFFICE IMMEDIATELY FOR ANY SIGNS OF INFECTION:    This includes fever, chills, increased redness, warmth, tenderness, severe discomfort/pain, or pus or foul smell coming from the wound  [facility] directly at [phone] (SKIN)    IF BLEEDING IS NOTICED:    Place a clean cloth over the area and apply firm pressure for thirty minutes  Check the wound ONLY after 30 minutes of direct pressure; do not cheat and sneak a peak, as that does not count  If bleeding persists after 30 minutes of legitimate direct pressure, then try one more round of direct pressure to the area  Should the bleeding become heavier or not stop after the second attempt, call [facility] directly at [phone] (SKIN)  Your call will get routed to the dermatology surgeon on call even after hours

## 2022-04-18 NOTE — PROGRESS NOTES
MOHS Procedure Note    Patient: Tania Cabrera  : 1935  MRN: [id number]  Date: 22    History of Present Illness: The patient is a 81 y o  male who presents with complaints of a Basal Cell Carcinoma on the tip of the nose      Past Medical History:   Diagnosis Date    Anemia     Arthritis     [facility] ([facility] )     Basal cell carcinoma 2022    Tip of Nose    CHF (congestive heart failure) (HCC)     Diabetes mellitus ([facility] )     Niddm    DVT (deep vein thrombosis) in pregnancy 1966    not in pregnancy    DVT (deep venous thrombosis) ([facility] ) 1966    Dyslipidemia     GERD (gastroesophageal reflux disease)     Hyperlipidemia     Hypertension     Irregular heart beat     Afib    Morbid obesity due to excess calories ([facility] )     Resolved 2014     Pulmonary embolism (HCC)     Sepsis ([place] [address] )     Squamous cell skin cancer 2020    Left posterior scalp    Visual impairment        Past Surgical History:   Procedure Laterality Date    AORTIC VALVE REPLACEMENT      CARDIAC DEFIBRILLATOR PLACEMENT  2014   Emaline Folds CARDIAC SURGERY  2014    AVR    COLONOSCOPY      INSERT / REPLACE / REMOVE PACEMAKER      JOINT REPLACEMENT Left     LTKR    MOHS SURGERY  2020    Left posterior scalp, Dr Kulwinder Wolfe  2022    BCC Tip of Nose- Dr Elda Bone SEP-FEM JUN Right 2018    Procedure: LEG PERFORATED INJECTION SCLEROTHERAPY;  Surgeon: Florencia Vera MD;  [facility];  Service: Vascular    REPLACEMENT TOTAL KNEE Right     TOTAL KNEE ARTHROPLASTY Left     VASCULAR SURGERY      VENA CAVA FILTER PLACEMENT      Interruption inferior vena cava, Josue filter, placement    WISDOM TOOTH EXTRACTION           Current Outpatient Medications:     acetaminophen (TYLENOL) 325 mg tablet, Take 2 tablets by mouth every 6 (six) hours as needed for mild pain or fever, Disp: 30 tablet, Rfl: 0    albuterol (PROVENTIL HFA,VENTOLIN HFA) 90 mcg/act inhaler, INHALE 2 PUFFS BY MOUTH EVERY 6 HOURS AS NEEDED FOR WHEEZING OR SHORTNESS OF BREATH, Disp: , Rfl:     amoxicillin (AMOXIL) 500 mg capsule, TK 4 CS BEFORE 2 HOURS BEFORE DENTAL OR FACIAL SURGERY WORK, Disp: , Rfl: 3    atorvastatin (LIPITOR) 40 mg tablet, Take 40 mg by mouth daily after dinner Atorvastatin Calcium 40 MG Oral Tablet Take 1 tablet daily  Refills: 0  Active , Disp: , Rfl:     B Complex-C (SUPER B COMPLEX PO), Take 1 capsule by mouth daily , Disp: , Rfl:     candesartan (ATACAND) 16 mg tablet, Take 1 tablet (16 mg total) by mouth daily, Disp: 90 tablet, Rfl: 3    candesartan-hydrochlorothiazide (ATACAND HCT) 16-12 5 MG per tablet, Take 1 tablet by mouth daily, Disp: , Rfl:     cholecalciferol (VITAMIN D3) 1,000 units tablet, Take 2,000 Units by mouth daily, Disp: , Rfl:     fluticasone (FLONASE) 50 mcg/act nasal spray, 2 sprays as needed Shake liquid and spray , Disp: , Rfl:     gabapentin (NEURONTIN) 100 mg capsule, , Disp: , Rfl:     hydrochlorothiazide (HYDRODIURIL) 25 mg tablet, Take 25 mg by mouth daily , Disp: , Rfl:     HYDROcodone-acetaminophen (NORCO) 5-325 mg per tablet, , Disp: , Rfl:     Iron Combinations (NIFEREX) TABS, Take one tablet daily, Disp: , Rfl: 5    meloxicam (MOBIC) 15 mg tablet, Take by mouth daily  , Disp: , Rfl:     metFORMIN (GLUCOPHAGE) 1000 MG tablet, Take 1,000 mg by mouth daily after dinner MetFORMIN HCl 1000 MG (MOD) TB24 Take one tablet daily  Refills: 0  Active , Disp: , Rfl:     Multiple Vitamin (MULTIVITAMINS PO), Take 1 tablet by mouth daily, Disp: , Rfl:     mupirocin (BACTROBAN) 2 % ointment, as needed , Disp: , Rfl:     omeprazole (PriLOSEC) 20 mg delayed release capsule, Omeprazole 20 MG Oral Tablet Delayed Release Take 1 tablet daily  Refills: 0  Active, Disp: , Rfl:     polyethylene glycol (GLYCOLAX) 17 GM/SCOOP powder, Take 17 g by mouth 2 (two) times a day, Disp: , Rfl:     potassium chloride (K-DUR,KLOR-CON) 20 mEq tablet, Take 20 mEq by mouth daily after dinner Potassium Chloride Jesusita ER 20 MEQ Oral Tablet Extended Release Take 1 tablet daily  Refills: 0  Active , Disp: , Rfl:     sotalol (BETAPACE) 80 mg tablet, Take 80 mg by mouth every 12 (twelve) hours , Disp: , Rfl: 2    torsemide (DEMADEX) 20 mg tablet, TAKE 1 TABLET(20 MG) BY MOUTH TWICE DAILY, Disp: 180 tablet, Rfl: 0    warfarin (COUMADIN) 4 mg tablet, Take 1 tablet (4 mg total) by mouth 2 (two) times a week Patient take 4mg everyday but Tuesday and Friday  (Patient taking differently: Take 4 mg by mouth 2 (two) times a week 4 or 6 mg as directed), Disp: 90 tablet, Rfl: 0    warfarin (COUMADIN) 6 mg tablet, Take 6 mg by mouth The patient takes on Tuesday and Fridays, Disp: , Rfl:     Allergies   Allergen Reactions    Tramadol Other (See Comments)     intolerance       Physical Exam:   Vitals:    04/18/22 1154   BP: 154/67   Pulse: 76   Resp: 18   Temp: 97 8 °F (36 6 °C)   SpO2: 98%     General: Awake, Alert, Oriented x 3, Mood and affect appropriate  Respiratory: Respirations even and unlabored  Cardiovascular: Peripheral pulses intact; no edema  Musculoskeletal Exam: N/A    Assessment: 0 5cm x 0 4cm pink scar, biopsy proven Superficial/Nodular Basal Cell Carcinoma      Plan: MOHS Procedure    MOHS Procedure Timeout      Most Recent Value   Timeout: 1644   Patient Identity Verified: Yes   Correct Site Verified: Yes   Correct Procedure Verified: Yes          MOHS Diagnosis/Indication/Location/ID      Most Recent Value   Pathology Type Basal cell carcinoma   Anatomic Site --  [Tip of Nose]   Indications for MOHS tumor location   MOHS ID [id number]          MOHS Site/Accession/Pre-Post      Most Recent Value   Original Site Identified (as submitted by referring clinician) Referral   Biopsy Accession/Specimen # (as submitted by referring clincian) [id number]   Pre-MOHS Size Length (cm) 0 5   Pre-MOHS Size Width (cm) 0 4   Post-MOHS Size-Length (cm) 1   Post MOHS Size-Width (cm) 1 2   Repair Type Intermediate layered closure   Suture Type Vicryl, Prolene   Prolene Suture Size 5   Vicryl Suture Size 5   Final repair length (cm): 2   Anesthetic Used 1% Lidocaine with epinephrine          MOHS Tumor Stage 1 Information      Most Recent Value   Tissue Sections (blocks) 2   Microscopic Exam Section 1: Irregularly shaped islands of basaloid keratinocytes with peripheral palisading and retraction artifact consistent with basal cell carcinoma were noted on microscopic analysis  The cells have scant cytoplasm and round dark nuclei , Emanating from the epidermis and infiltrating the dermis are irregularly shaped islands of basaloid keratinocytes  The nuclei at the periphery of the islands have a palisaded arrangement  [address] are associated with a fibromyxoid stroma and clefting , Arising from the epidermis and superficial follicles are small, superficial, multicentric buds of basaloid keratinocytes associated with a fibromyxoid stroma and clefting  Nuclei at the periphery of the islands have a palisaded arrangement  Microscopic Exam Section 2: Irregularly shaped islands of basaloid keratinocytes with peripheral palisading and retraction artifact consistent with basal cell carcinoma were noted on microscopic analysis  The cells have scant cytoplasm and round dark nuclei , Emanating from the epidermis and infiltrating the dermis are irregularly shaped islands of basaloid keratinocytes  The nuclei at the periphery of the islands have a palisaded arrangement  [address] are associated with a fibromyxoid stroma and clefting , Arising from the epidermis and superficial follicles are small, superficial, multicentric buds of basaloid keratinocytes associated with a fibromyxoid stroma and clefting  Nuclei at the periphery of the islands have a palisaded arrangement  Tumor Clear After Stage I?  No          MOHS Tumor Stage 2 Information      Most Recent Value Tissue Sections (blocks) 1   Microscopic Exam Section 1: No tumor identified in section  Tumor Clear After Stage II? Yes                      Patient identified, procedure verified, site identified and verified  Time out completed  Surgical removal of the lesion discussed with the patient (risks and benefits, including possibility of scarring, infection, recurrence or potential for further treatment)  I have specifically identified the site with the patient  I have discussed the fact that the patient will have a scar after the procedure regardless of granulation or repair with sutures  I have discussed that the repair options can range from granulation in some cases to linear or curvilinear closures to larger flaps or grafts  There are sometimes flaps or grafts used that require multiples stages of surgery and will not be completed today, rather be completed over a series of appointments  I have discussed that occasionally due to location, size or depth of the lesion I may recommend consultation with and transfer of care for further removal or the reconstruction to another provider such as ophthalmology surgery, plastic surgery, ENT surgery, or surgical oncology  There are cases in which other testing such as imaging with MRI or CT scan or testing of lymph nodes is recommended because of the nature/depth/location of tumor seen during the removal  There is a risk of injury to nerves causing temporary or permanent numbness or the inability to move muscles full such as the inability to lift eyebrows  Questions answered and verbal and written consent was obtained  The tumor qualifies for Mohs based on AUC criteria  Dr Frances Smith served as the surgeon and pathologist during the procedure  With the patient in the supine position and under adequate local anesthesia with 1% lidocaine with epinephrine 1:100,000, the defect was scrubbed with Hibiclens  Sterile drapes were placed from the sterile tray    Because of the location of the surgical defect, an intermediate closure was judged to give the best possible cosmetic and functional result  The edges of the defect were carefully debrided removing any dead or coagulated tissue  Hemostasis was obtained by pinpoint electrocoagulation  Careful planning of removal of redundant tissue at either end of the defect was drawn out so that the suture lines would fall in the optimal orientation with regard to the relaxed skin tension lines  These were then removed with a #15 blade scalpel  The wound was then approximated by a deep layer of buried vertical mattress sutures and the cutaneous margins were approximated and closed by superficial sutures as noted above  Estimated blood loss was less than 5 mL  The patient tolerated the procedure well  The wound was dressed with petrolatum, a non-stick pad, and a compression dressing  Jackson Ordonez MD served as the surgeon and pathologist during the procedure  Postoperative care: Wound care discussed at length  I urged the patient to call us if any problems or question should arise  Complications: none  Post-op medications: none  Patient condition after procedure: stable  Discharge plans: Plan for suture removal 7 days, at next scheduled appointment  BCC cleared with 2 stages of mohs and repaired with 2cm closure  Well tolerated  S/R in 1 week      Scribe Attestation    I,:  Charis Jimenez RN am acting as a scribe while in the presence of the attending physician :       I,:  Jackson Ordonez MD personally performed the services described in this documentation    as scribed in my presence :

## 2022-04-25 ENCOUNTER — OFFICE VISIT (OUTPATIENT)
Dept: DERMATOLOGY | Facility: CLINIC | Age: 87
End: 2022-04-25

## 2022-04-25 DIAGNOSIS — Z48.02 ENCOUNTER FOR REMOVAL OF SUTURES: Primary | ICD-10-CM

## 2022-04-25 PROCEDURE — 99024 POSTOP FOLLOW-UP VISIT: CPT | Performed by: DERMATOLOGY

## 2022-04-25 NOTE — PROGRESS NOTES
]Suture removal    Date/Time: 4/25/2022 8:35 AM  Performed by: Felix Trinidad MA  Authorized by: Keren Carrera MD   Universal Protocol:  Consent: Verbal consent obtained  Consent given by: patient  Timeout called at: 4/25/2022 8:35 AM   Patient understanding: patient states understanding of the procedure being performed  Patient consent: the patient's understanding of the procedure matches consent given  Procedure consent: procedure consent matches procedure scheduled  Relevant documents: relevant documents present and verified  Test results: test results not available  Site marked: the operative site was not marked  Radiology Images displayed and confirmed  If images not available, report reviewed: imaging studies not available  Patient identity confirmed: verbally with patient        Patient location:  Clinic  Location:     Location:  [address] [facility] location:  Nose  Procedure details: Tools used:  Suture removal kit    Wound appearance:  No sign(s) of infection    Number of sutures removed:  5  Post-procedure details:     Post-removal:  No dressing applied and antibiotic ointment applied (vaseline)    Patient tolerance of procedure: Tolerated well, no immediate complications  Comments: Follow Up 3-6 months Skin Exam        Well healing scar, sutures removed  Discussed erythema should improve over time and to call for worsening symptoms      Scribe Attestation    I,:  Felix Trinidad MA am acting as a scribe while in the presence of the attending physician :       I,:  Felix Trinidad MA personally performed the services described in this documentation    as scribed in my presence :

## 2022-05-07 ENCOUNTER — HOSPITAL ENCOUNTER (INPATIENT)
Facility: HOSPITAL | Age: 87
LOS: 2 days | Discharge: HOME | DRG: 291 | End: 2022-05-09
Attending: EMERGENCY MEDICINE | Admitting: INTERNAL MEDICINE
Payer: MEDICARE

## 2022-05-07 ENCOUNTER — APPOINTMENT (EMERGENCY)
Dept: CT IMAGING | Facility: HOSPITAL | Age: 87
DRG: 291 | End: 2022-05-07
Payer: MEDICARE

## 2022-05-07 ENCOUNTER — HOSPITAL ENCOUNTER (EMERGENCY)
Dept: VASCULAR ULTRASOUND | Facility: HOSPITAL | Age: 87
Discharge: HOME | DRG: 291 | End: 2022-05-07
Payer: MEDICARE

## 2022-05-07 ENCOUNTER — APPOINTMENT (EMERGENCY)
Dept: RADIOLOGY | Facility: HOSPITAL | Age: 87
DRG: 291 | End: 2022-05-07
Payer: MEDICARE

## 2022-05-07 DIAGNOSIS — R06.02 SOB (SHORTNESS OF BREATH): ICD-10-CM

## 2022-05-07 DIAGNOSIS — N18.31 STAGE 3A CHRONIC KIDNEY DISEASE (HCC): ICD-10-CM

## 2022-05-07 DIAGNOSIS — I50.9 CHF (CONGESTIVE HEART FAILURE) (HCC): Primary | ICD-10-CM

## 2022-05-07 DIAGNOSIS — R60.9 PERIPHERAL EDEMA: ICD-10-CM

## 2022-05-07 DIAGNOSIS — R09.02 HYPOXIA: ICD-10-CM

## 2022-05-07 LAB
2HR DELTA HS TROPONIN: 1 NG/L
ALBUMIN SERPL BCP-MCNC: 4.1 G/DL (ref 3.5–5)
ALP SERPL-CCNC: 61 U/L (ref 34–104)
ALT SERPL W P-5'-P-CCNC: 13 U/L (ref 7–52)
ANION GAP SERPL CALCULATED.3IONS-SCNC: 9 MMOL/L (ref 4–13)
AST SERPL W P-5'-P-CCNC: 15 U/L (ref 13–39)
ATRIAL RATE: 84 BPM
BASOPHILS # BLD AUTO: 0.02 THOUSANDS/ΜL (ref 0–0.1)
BASOPHILS NFR BLD AUTO: 0 % (ref 0–1)
BILIRUB SERPL-MCNC: 3.22 MG/DL (ref 0.2–1)
BNP SERPL-MCNC: 224 PG/ML (ref 0–100)
BUN SERPL-MCNC: 33 MG/DL (ref 5–25)
CALCIUM SERPL-MCNC: 8.9 MG/DL (ref 8.4–10.2)
CARDIAC TROPONIN I PNL SERPL HS: 12 NG/L
CARDIAC TROPONIN I PNL SERPL HS: 13 NG/L
CHLORIDE SERPL-SCNC: 106 MMOL/L (ref 96–108)
CO2 SERPL-SCNC: 25 MMOL/L (ref 21–32)
CREAT SERPL-MCNC: 1.51 MG/DL (ref 0.6–1.3)
EOSINOPHIL # BLD AUTO: 0.14 THOUSAND/ΜL (ref 0–0.61)
EOSINOPHIL NFR BLD AUTO: 2 % (ref 0–6)
ERYTHROCYTE [DISTWIDTH] IN BLOOD BY AUTOMATED COUNT: 14.8 % (ref 11.6–15.1)
FLUAV RNA RESP QL NAA+PROBE: NEGATIVE
FLUBV RNA RESP QL NAA+PROBE: NEGATIVE
GFR SERPL CREATININE-BSD FRML MDRD: 41 ML/MIN/1.73SQ M
GLUCOSE SERPL-MCNC: 116 MG/DL (ref 65–140)
GLUCOSE SERPL-MCNC: 158 MG/DL (ref 65–140)
HCT VFR BLD AUTO: 32.4 % (ref 36.5–49.3)
HGB BLD-MCNC: 10.1 G/DL (ref 12–17)
IMM GRANULOCYTES # BLD AUTO: 0.03 THOUSAND/UL (ref 0–0.2)
IMM GRANULOCYTES NFR BLD AUTO: 1 % (ref 0–2)
INR PPP: 2.44 (ref 0.84–1.19)
LACTATE SERPL-SCNC: 1.4 MMOL/L (ref 0.5–2)
LIPASE SERPL-CCNC: 19 U/L (ref 11–82)
LYMPHOCYTES # BLD AUTO: 0.92 THOUSANDS/ΜL (ref 0.6–4.47)
LYMPHOCYTES NFR BLD AUTO: 15 % (ref 14–44)
MAGNESIUM SERPL-MCNC: 1.6 MG/DL (ref 1.9–2.7)
MCH RBC QN AUTO: 29.4 PG (ref 26.8–34.3)
MCHC RBC AUTO-ENTMCNC: 31.2 G/DL (ref 31.4–37.4)
MCV RBC AUTO: 95 FL (ref 82–98)
MONOCYTES # BLD AUTO: 0.74 THOUSAND/ΜL (ref 0.17–1.22)
MONOCYTES NFR BLD AUTO: 12 % (ref 4–12)
NEUTROPHILS # BLD AUTO: 4.25 THOUSANDS/ΜL (ref 1.85–7.62)
NEUTS SEG NFR BLD AUTO: 70 % (ref 43–75)
NRBC BLD AUTO-RTO: 0 /100 WBCS
P AXIS: 71 DEGREES
PLATELET # BLD AUTO: 221 THOUSANDS/UL (ref 149–390)
PMV BLD AUTO: 10 FL (ref 8.9–12.7)
POTASSIUM SERPL-SCNC: 4.4 MMOL/L (ref 3.5–5.3)
PR INTERVAL: 280 MS
PROCALCITONIN SERPL-MCNC: <0.05 NG/ML
PROT SERPL-MCNC: 7.2 G/DL (ref 6.4–8.4)
PROTHROMBIN TIME: 26 SECONDS (ref 11.6–14.5)
QRS AXIS: 87 DEGREES
QRSD INTERVAL: 150 MS
QT INTERVAL: 430 MS
QTC INTERVAL: 500 MS
RBC # BLD AUTO: 3.43 MILLION/UL (ref 3.88–5.62)
RSV RNA RESP QL NAA+PROBE: NEGATIVE
SARS-COV-2 RNA RESP QL NAA+PROBE: NEGATIVE
SODIUM SERPL-SCNC: 140 MMOL/L (ref 135–147)
T WAVE AXIS: 71 DEGREES
VENTRICULAR RATE: 81 BPM
WBC # BLD AUTO: 6.1 THOUSAND/UL (ref 4.31–10.16)

## 2022-05-07 PROCEDURE — 71275 CT ANGIOGRAPHY CHEST: CPT

## 2022-05-07 PROCEDURE — 82948 REAGENT STRIP/BLOOD GLUCOSE: CPT

## 2022-05-07 PROCEDURE — 93005 ELECTROCARDIOGRAM TRACING: CPT

## 2022-05-07 PROCEDURE — 99285 EMERGENCY DEPT VISIT HI MDM: CPT

## 2022-05-07 PROCEDURE — 36415 COLL VENOUS BLD VENIPUNCTURE: CPT

## 2022-05-07 PROCEDURE — 71045 X-RAY EXAM CHEST 1 VIEW: CPT

## 2022-05-07 PROCEDURE — 1124F ACP DISCUSS-NO DSCNMKR DOCD: CPT

## 2022-05-07 PROCEDURE — 85025 COMPLETE CBC W/AUTO DIFF WBC: CPT

## 2022-05-07 PROCEDURE — 80053 COMPREHEN METABOLIC PANEL: CPT

## 2022-05-07 PROCEDURE — 84484 ASSAY OF TROPONIN QUANT: CPT

## 2022-05-07 PROCEDURE — G1004 CDSM NDSC: HCPCS

## 2022-05-07 PROCEDURE — 85610 PROTHROMBIN TIME: CPT | Performed by: INTERNAL MEDICINE

## 2022-05-07 PROCEDURE — 0241U HB NFCT DS VIR RESP RNA 4 TRGT: CPT

## 2022-05-07 PROCEDURE — 83735 ASSAY OF MAGNESIUM: CPT

## 2022-05-07 PROCEDURE — 93971 EXTREMITY STUDY: CPT

## 2022-05-07 PROCEDURE — 99223 1ST HOSP IP/OBS HIGH 75: CPT | Performed by: INTERNAL MEDICINE

## 2022-05-07 PROCEDURE — 93010 ELECTROCARDIOGRAM REPORT: CPT | Performed by: INTERNAL MEDICINE

## 2022-05-07 PROCEDURE — 83880 ASSAY OF NATRIURETIC PEPTIDE: CPT

## 2022-05-07 PROCEDURE — 83690 ASSAY OF LIPASE: CPT

## 2022-05-07 PROCEDURE — 83605 ASSAY OF LACTIC ACID: CPT

## 2022-05-07 PROCEDURE — 87040 BLOOD CULTURE FOR BACTERIA: CPT

## 2022-05-07 PROCEDURE — 84145 PROCALCITONIN (PCT): CPT

## 2022-05-07 RX ORDER — INSULIN LISPRO 100 [IU]/ML
2-12 INJECTION, SOLUTION INTRAVENOUS; SUBCUTANEOUS
Status: DISCONTINUED | OUTPATIENT
Start: 2022-05-08 | End: 2022-05-09 | Stop reason: HOSPADM

## 2022-05-07 RX ORDER — ACETAMINOPHEN 325 MG/1
650 TABLET ORAL EVERY 4 HOURS PRN
Status: DISCONTINUED | OUTPATIENT
Start: 2022-05-07 | End: 2022-05-07

## 2022-05-07 RX ORDER — MAGNESIUM SULFATE HEPTAHYDRATE 40 MG/ML
2 INJECTION, SOLUTION INTRAVENOUS ONCE
Status: DISCONTINUED | OUTPATIENT
Start: 2022-05-07 | End: 2022-05-07 | Stop reason: SDUPTHER

## 2022-05-07 RX ORDER — FUROSEMIDE 10 MG/ML
40 INJECTION INTRAMUSCULAR; INTRAVENOUS 2 TIMES DAILY
Status: DISCONTINUED | OUTPATIENT
Start: 2022-05-07 | End: 2022-05-07

## 2022-05-07 RX ORDER — FERROUS ASPARTO GLYCINATE, IRON, ASCORBIC ACID, FOLIC ACID, CYANOCOBALAMIN, ZINC, SUCCINIC ACID, AND INTRINSIC FACTOR 50; 100; 60; 750; 250; 25; 15; 50; 100 MG/1; MG/1; MG/1; UG/1; UG/1; UG/1; MG/1; MG/1; MG/1
1 TABLET ORAL DAILY
Status: DISCONTINUED | OUTPATIENT
Start: 2022-05-08 | End: 2022-05-07 | Stop reason: CLARIF

## 2022-05-07 RX ORDER — FLUTICASONE PROPIONATE 50 MCG
2 SPRAY, SUSPENSION (ML) NASAL DAILY
Status: DISCONTINUED | OUTPATIENT
Start: 2022-05-08 | End: 2022-05-09 | Stop reason: HOSPADM

## 2022-05-07 RX ORDER — SODIUM CHLORIDE 9 MG/ML
3 INJECTION INTRAVENOUS
Status: DISCONTINUED | OUTPATIENT
Start: 2022-05-07 | End: 2022-05-09 | Stop reason: HOSPADM

## 2022-05-07 RX ORDER — MELATONIN
2000 DAILY
Status: DISCONTINUED | OUTPATIENT
Start: 2022-05-08 | End: 2022-05-09 | Stop reason: HOSPADM

## 2022-05-07 RX ORDER — FUROSEMIDE 10 MG/ML
40 INJECTION INTRAMUSCULAR; INTRAVENOUS 2 TIMES DAILY
Status: DISCONTINUED | OUTPATIENT
Start: 2022-05-08 | End: 2022-05-08

## 2022-05-07 RX ORDER — FUROSEMIDE 10 MG/ML
60 INJECTION INTRAMUSCULAR; INTRAVENOUS ONCE
Status: COMPLETED | OUTPATIENT
Start: 2022-05-07 | End: 2022-05-07

## 2022-05-07 RX ORDER — ACETAMINOPHEN 325 MG/1
650 TABLET ORAL EVERY 6 HOURS PRN
Status: DISCONTINUED | OUTPATIENT
Start: 2022-05-07 | End: 2022-05-09 | Stop reason: HOSPADM

## 2022-05-07 RX ORDER — INSULIN LISPRO 100 [IU]/ML
1-5 INJECTION, SOLUTION INTRAVENOUS; SUBCUTANEOUS
Status: DISCONTINUED | OUTPATIENT
Start: 2022-05-07 | End: 2022-05-09 | Stop reason: HOSPADM

## 2022-05-07 RX ORDER — POTASSIUM CHLORIDE 20 MEQ/1
20 TABLET, EXTENDED RELEASE ORAL
Status: DISCONTINUED | OUTPATIENT
Start: 2022-05-08 | End: 2022-05-09 | Stop reason: HOSPADM

## 2022-05-07 RX ORDER — WARFARIN SODIUM 6 MG/1
6 TABLET ORAL
Status: DISCONTINUED | OUTPATIENT
Start: 2022-05-10 | End: 2022-05-09 | Stop reason: HOSPADM

## 2022-05-07 RX ORDER — SOTALOL HYDROCHLORIDE 80 MG/1
80 TABLET ORAL EVERY 12 HOURS
Status: DISCONTINUED | OUTPATIENT
Start: 2022-05-07 | End: 2022-05-09 | Stop reason: HOSPADM

## 2022-05-07 RX ORDER — ATORVASTATIN CALCIUM 40 MG/1
40 TABLET, FILM COATED ORAL
Status: DISCONTINUED | OUTPATIENT
Start: 2022-05-07 | End: 2022-05-09 | Stop reason: HOSPADM

## 2022-05-07 RX ORDER — MAGNESIUM SULFATE HEPTAHYDRATE 40 MG/ML
2 INJECTION, SOLUTION INTRAVENOUS ONCE
Status: COMPLETED | OUTPATIENT
Start: 2022-05-07 | End: 2022-05-07

## 2022-05-07 RX ORDER — WARFARIN SODIUM 4 MG/1
4 TABLET ORAL
Status: DISCONTINUED | OUTPATIENT
Start: 2022-05-07 | End: 2022-05-09 | Stop reason: HOSPADM

## 2022-05-07 RX ORDER — PANTOPRAZOLE SODIUM 40 MG/1
40 TABLET, DELAYED RELEASE ORAL
Status: DISCONTINUED | OUTPATIENT
Start: 2022-05-08 | End: 2022-05-09 | Stop reason: HOSPADM

## 2022-05-07 RX ORDER — POTASSIUM CHLORIDE 20 MEQ/1
20 TABLET, EXTENDED RELEASE ORAL
Status: DISCONTINUED | OUTPATIENT
Start: 2022-05-07 | End: 2022-05-07

## 2022-05-07 RX ORDER — BENZONATATE 100 MG/1
100 CAPSULE ORAL ONCE
Status: COMPLETED | OUTPATIENT
Start: 2022-05-07 | End: 2022-05-07

## 2022-05-07 RX ADMIN — FUROSEMIDE 60 MG: 10 INJECTION, SOLUTION INTRAMUSCULAR; INTRAVENOUS at 19:20

## 2022-05-07 RX ADMIN — IOHEXOL 100 ML: 350 INJECTION, SOLUTION INTRAVENOUS at 17:51

## 2022-05-07 RX ADMIN — SOTALOL HYDROCHLORIDE 80 MG: 80 TABLET ORAL at 21:35

## 2022-05-07 RX ADMIN — MAGNESIUM SULFATE HEPTAHYDRATE 2 G: 40 INJECTION, SOLUTION INTRAVENOUS at 19:52

## 2022-05-07 RX ADMIN — BENZONATATE 100 MG: 100 CAPSULE ORAL at 19:52

## 2022-05-07 RX ADMIN — ATORVASTATIN CALCIUM 40 MG: 40 TABLET, FILM COATED ORAL at 21:35

## 2022-05-07 NOTE — ASSESSMENT & PLAN NOTE
Wt Readings from Last 3 Encounters:   05/07/22 (!) 160 kg (352 lb)   04/18/22 (!) 159 kg (350 lb 12 8 oz)   04/04/22 (!) 150 kg (330 lb)     · POA, history of diastolic heart failure on torsemide 20 mg twice daily  · Patient on advise of his pharmacist discontinued torsemide about 1 month ago after he was started on blood pressure medication with HCTZ  · Presents with 1 day history shortness of breath especially with exertion  · CTA chest revealed "1  Findings suggestive of mild congestive heart failure   2  No evidence of acute pulmonary embolus, thoracic aortic aneurysm or dissection"  · Last echocardiogram was in November 2020 which showed EF 55%, mild to moderately dilated left and right atrium, bioprosthetic AV  · He will be started on IV Lasix 40 mg twice daily, daily weights, I's and O's, 2 g salt diet  · Obtain echocardiogram, cardiology consult

## 2022-05-07 NOTE — ASSESSMENT & PLAN NOTE
· Heart rate on telemetry ranging from 40s to 80s during my evaluation  · He is status post pacemaker placement and on sotalol  · Continue medications  Anticoagulation with Coumadin    INR pending

## 2022-05-07 NOTE — ASSESSMENT & PLAN NOTE
Lab Results   Component Value Date    HGBA1C 6 3 (H) 03/01/2022     No results for input(s): POCGLU in the last 72 hours  Blood Sugar Average: Last 72 hrs:  ·  Most recent hemoglobin A1c results noted  · On metformin 1000 mg q p m   At home  · Hold oral medications, start on Accu-Cheks a c  HS with SSI coverage

## 2022-05-07 NOTE — ASSESSMENT & PLAN NOTE
· History of V-tach    Status post ICD placement  · Patient denies ICD firing  · Follows outpatient with Dr Imani Blanco

## 2022-05-07 NOTE — ASSESSMENT & PLAN NOTE
Lab Results   Component Value Date    EGFR 41 05/07/2022    EGFR 53 11/05/2020    EGFR 54 06/29/2020    CREATININE 1 51 (H) 05/07/2022    CREATININE 1 25 11/05/2020    CREATININE 1 22 06/29/2020     · Baseline creatinine appears to be around 1 2  · Patient presents with creatinine elevated at 1 51 likely in the setting of acute CHF  · Continue to monitor closely with diuresis  · Hold ARB pending return to baseline function  · Avoid NSAID's

## 2022-05-07 NOTE — ED PROVIDER NOTES
History  Chief Complaint   Patient presents with    Shortness of Breath     sob since yesterday  worse today  states he is unable to ambulate more than 30 feet  no chest pain  speaking in full sentences   Leg Swelling     RLE edema p knee replacement 11/2021     Patient is a 12year-old male who comes in for 2 days of shortness of breath  Patient denies any chest pain, nausea, vomiting, abdominal pain, additional swelling of the legs  Patient states that he has been compliant with his medications  Patient does have a cardiac history of heart valve replacement, and chronic DVTs  Patient states he feels out of breath after approximately 30 ft of walking      History provided by:  Patient   used: No    Shortness of Breath  Severity:  Moderate  Associated symptoms: no abdominal pain, no chest pain, no cough, no diaphoresis, no fever, no headaches, no vomiting and no wheezing    Risk factors: hx of PE/DVT and obesity        Prior to Admission Medications   Prescriptions Last Dose Informant Patient Reported? Taking?    B Complex-C (SUPER B COMPLEX PO)  Self Yes Yes   Sig: Take 1 capsule by mouth daily    HYDROcodone-acetaminophen (NORCO) 5-325 mg per tablet More than a month at Unknown time  Yes No   Sig: Take 1 tablet by mouth every 6 (six) hours as needed     Iron Combinations (NIFEREX) TABS  Self Yes Yes   Sig: Take one tablet daily   Multiple Vitamin (MULTIVITAMINS PO)  Self Yes Yes   Sig: Take 1 tablet by mouth daily   acetaminophen (TYLENOL) 325 mg tablet Past Month at Unknown time Self No Yes   Sig: Take 2 tablets by mouth every 6 (six) hours as needed for mild pain or fever   albuterol (PROVENTIL HFA,VENTOLIN HFA) 90 mcg/act inhaler Past Month at Unknown time Self Yes Yes   Sig: INHALE 2 PUFFS BY MOUTH EVERY 6 HOURS AS NEEDED FOR WHEEZING OR SHORTNESS OF BREATH   amoxicillin (AMOXIL) 500 mg capsule More than a month at Unknown time Self Yes No   Sig: TK 4 CS BEFORE 2 HOURS BEFORE DENTAL OR FACIAL SURGERY WORK   atorvastatin (LIPITOR) 40 mg tablet  Self Yes Yes   Sig: Take 40 mg by mouth daily after dinner Atorvastatin Calcium 40 MG Oral Tablet Take 1 tablet daily  Refills: 0  Active    candesartan (ATACAND) 16 mg tablet  Self No Yes   Sig: Take 1 tablet (16 mg total) by mouth daily   candesartan-hydrochlorothiazide (ATACAND HCT) 16-12 5 MG per tablet   Yes Yes   Sig: Take 1 tablet by mouth daily   cholecalciferol (VITAMIN D3) 1,000 units tablet  Self Yes Yes   Sig: Take 2,000 Units by mouth daily   fluticasone (FLONASE) 50 mcg/act nasal spray Past Month at Unknown time Self Yes Yes   Si sprays as needed Shake liquid and spray    gabapentin (NEURONTIN) 100 mg capsule   Yes Yes   hydrochlorothiazide (HYDRODIURIL) 25 mg tablet  Self Yes Yes   Sig: Take 25 mg by mouth daily    meloxicam (MOBIC) 15 mg tablet  Self Yes Yes   Sig: Take by mouth daily     metFORMIN (GLUCOPHAGE) 1000 MG tablet  Self Yes Yes   Sig: Take 1,000 mg by mouth daily after dinner MetFORMIN HCl 1000 MG (MOD) TB24 Take one tablet daily  Refills: 0  Active    mupirocin (BACTROBAN) 2 % ointment Past Month at Unknown time Self Yes Yes   Sig: as needed    omeprazole (PriLOSEC) 20 mg delayed release capsule  Self Yes Yes   Sig: Omeprazole 20 MG Oral Tablet Delayed Release Take 1 tablet daily  Refills: 0  Active   polyethylene glycol (GLYCOLAX) 17 GM/SCOOP powder   Yes Yes   Sig: Take 17 g by mouth 2 (two) times a day   potassium chloride (K-DUR,KLOR-CON) 20 mEq tablet  Self Yes Yes   Sig: Take 20 mEq by mouth daily after dinner Potassium Chloride Jesusita ER 20 MEQ Oral Tablet Extended Release Take 1 tablet daily  Refills: 0  Active    sotalol (BETAPACE) 80 mg tablet  Self Yes Yes   Sig: Take 80 mg by mouth every 12 (twelve) hours    torsemide (DEMADEX) 20 mg tablet  Self No Yes   Sig: TAKE 1 TABLET(20 MG) BY MOUTH TWICE DAILY   warfarin (COUMADIN) 4 mg tablet  Self No Yes   Sig: Take 1 tablet (4 mg total) by mouth 2 (two) times a week Patient take 4mg everyday but Tuesday and Friday     Patient taking differently: Take 4 mg by mouth 2 (two) times a week 4 or 6 mg as directed   warfarin (COUMADIN) 6 mg tablet  Self Yes Yes   Sig: Take 6 mg by mouth The patient takes on Tuesday and Fridays      Facility-Administered Medications: None       Past Medical History:   Diagnosis Date    Anemia     Arthritis     [facility]     Basal cell carcinoma 03/22/2022    Tip of Nose    CHF (congestive heart failure) (HCC)     Diabetes mellitus ([facility] )     Niddm    DVT (deep vein thrombosis) in pregnancy 1966    not in pregnancy    DVT (deep venous thrombosis) ([facility] ) 1966    Dyslipidemia     GERD (gastroesophageal reflux disease)     Hyperlipidemia     Hypertension     Irregular heart beat     Afib    Morbid obesity due to excess calories ([place] [address] )     Resolved 9/2/2014     Pulmonary embolism (HCC)     Sepsis ([facility] )     Squamous cell skin cancer 07/30/2020    Left posterior scalp    Visual impairment        Past Surgical History:   Procedure Laterality Date    AORTIC VALVE REPLACEMENT      CARDIAC DEFIBRILLATOR PLACEMENT  04/2014   [facility] CARDIAC SURGERY  02/2014    AVR    COLONOSCOPY      INSERT / REPLACE / REMOVE PACEMAKER      JOINT REPLACEMENT Left     LTKR    MOHS SURGERY  07/30/2020    Left posterior scalp, Dr Baron Esteban  04/18/2022    [facility] Tip of Nose- Dr Holly Lindo SEP-FEM JUNC Right 8/17/2018    Procedure: LEG PERFORATED INJECTION SCLEROTHERAPY;  Surgeon: Ran Torres MD;  [facility];  Service: Vascular    REPLACEMENT TOTAL KNEE Right     TOTAL KNEE ARTHROPLASTY Left     VASCULAR SURGERY      VENA CAVA FILTER PLACEMENT      Interruption inferior vena cava, Josue filter, placement    WISDOM TOOTH EXTRACTION         Family History   Problem Relation Age of Onset    Arthritis Mother     Stroke Mother     Arthritis Father     Cancer Father     Arthritis Daughter      I have reviewed and agree with the history as documented  E-Cigarette/Vaping    E-Cigarette Use Never User      E-Cigarette/Vaping Substances    Nicotine No     THC No     CBD No     Other No     Unknown No      Social History     Tobacco Use    Smoking status: Never Smoker    Smokeless tobacco: Never Used   Vaping Use    Vaping Use: Never used   Substance Use Topics    Alcohol use: Not Currently    Drug use: No       Review of Systems   Constitutional: Negative  Negative for activity change, appetite change, diaphoresis and fever  HENT: Negative  Eyes: Negative  Respiratory: Positive for shortness of breath  Negative for cough, chest tightness and wheezing  Cardiovascular: Negative  Negative for chest pain and palpitations  Gastrointestinal: Negative  Negative for abdominal pain and vomiting  Genitourinary: Negative  Musculoskeletal: Negative  Skin: Negative  Neurological: Negative  Negative for headaches  Psychiatric/Behavioral: Negative  Physical Exam  Physical Exam  Vitals reviewed  Constitutional:       Appearance: He is well-developed  He is obese  HENT:      Head: Normocephalic and atraumatic  Right Ear: External ear normal       Left Ear: External ear normal       Nose: Nose normal    Eyes:      Conjunctiva/sclera: Conjunctivae normal    Cardiovascular:      Rate and Rhythm: Normal rate  Pulmonary:      Effort: Pulmonary effort is normal       Breath sounds: Normal breath sounds  Abdominal:      Palpations: Abdomen is soft  There is no mass  Tenderness: There is no abdominal tenderness  There is no guarding  Musculoskeletal:         General: Normal range of motion  Cervical back: Normal range of motion  Skin:     General: Skin is warm and dry  Neurological:      Mental Status: He is alert           Vital Signs  ED Triage Vitals   Temperature Pulse Respirations Blood Pressure SpO2   05/07/22 1600 05/07/22 1545 05/07/22 1545 05/07/22 1545 05/07/22 1545   98 9 °F (37 2 °C) 84 20 (!) 176/77 99 %      Temp Source Heart Rate Source Patient Position - Orthostatic VS BP Location FiO2 (%)   05/07/22 1600 05/07/22 1558 05/07/22 1600 05/07/22 1600 --   Oral Right Lying Right arm       Pain Score       05/07/22 1600       5           Vitals:    05/07/22 1558 05/07/22 1600 05/07/22 1700 05/07/22 1807   BP:  170/80 164/72 148/70   Pulse: 77 78 76 70   Patient Position - Orthostatic VS:  Lying Lying          Visual Acuity      ED Medications  Medications   sodium chloride (PF) 0 9 % injection 3 mL (has no administration in time range)   magnesium sulfate 2 g/50 mL IVPB (premix) 2 g (has no administration in time range)   furosemide (LASIX) injection 60 mg (has no administration in time range)   iohexol (OMNIPAQUE) 350 MG/ML injection (SINGLE-DOSE) 100 mL (100 mL Intravenous Given 5/7/22 1751)       Diagnostic Studies  Results Reviewed     Procedure Component Value Units Date/Time    HS Troponin I 2hr [[id number]]  (Normal) Collected: 05/07/22 1805    Lab Status: Final result Specimen: Blood from Line, Venous Updated: 05/07/22 1901     hs TnI 2hr 13 ng/L      Delta 2hr hsTnI 1 ng/L     COVID/FLU/RSV [[id number]]     Lab Status: No result Specimen: Nares from Nose     Procalcitonin [[id number]]  (Normal) Collected: 05/07/22 1550    Lab Status: Final result Specimen: Blood from Arm, Left Updated: 05/07/22 1701     Procalcitonin <0 05 ng/ml     HS Troponin 0hr (reflex protocol) [[id number]]  (Normal) Collected: 05/07/22 1550    Lab Status: Final result Specimen: Blood from Arm, Left Updated: 05/07/22 1700     hs TnI 0hr 12 ng/L     Comprehensive metabolic panel [[id number]]  (Abnormal) Collected: 05/07/22 1550    Lab Status: Final result Specimen: Blood from Arm, Left Updated: 05/07/22 1655     Sodium 140 mmol/L      Potassium 4 4 mmol/L      Chloride 106 mmol/L      CO2 25 mmol/L      ANION GAP 9 mmol/L      BUN 33 mg/dL      Creatinine 1 51 mg/dL      Glucose 116 mg/dL      Calcium 8 9 mg/dL      AST 15 U/L      ALT 13 U/L      Alkaline Phosphatase 61 U/L      Total Protein 7 2 g/dL      Albumin 4 1 g/dL      Total Bilirubin 3 22 mg/dL      eGFR 41 ml/min/1 73sq m     Narrative:      [place] guidelines for Chronic Kidney Disease (CKD):     Stage 1 with normal or high GFR (GFR > 90 mL/min/1 73 square meters)    Stage 2 Mild CKD (GFR = 60-89 mL/min/1 73 square meters)    Stage 3A Moderate CKD (GFR = 45-59 mL/min/1 73 square meters)    Stage 3B Moderate CKD (GFR = 30-44 mL/min/1 73 square meters)    Stage 4 Severe CKD (GFR = 15-29 mL/min/1 73 square meters)    Stage 5 End Stage CKD (GFR <15 mL/min/1 73 square meters)  Note: GFR calculation is accurate only with a steady state creatinine    Lipase [[id number]]  (Normal) Collected: 05/07/22 1550    Lab Status: Final result Specimen: Blood from Arm, Left Updated: 05/07/22 1655     Lipase 19 u/L     Magnesium [[id number]]  (Abnormal) Collected: 05/07/22 1550    Lab Status: Final result Specimen: Blood from Arm, Left Updated: 05/07/22 1655     Magnesium 1 6 mg/dL     Lactic acid, plasma [[id number]]  (Normal) Collected: 05/07/22 1550    Lab Status: Final result Specimen: Blood from Arm, Left Updated: 05/07/22 1653     LACTIC ACID 1 4 mmol/L     Narrative:      Result may be elevated if tourniquet was used during collection      CBC and differential [[id number]]  (Abnormal) Collected: 05/07/22 1550    Lab Status: Final result Specimen: Blood from Arm, Left Updated: 05/07/22 1632     WBC 6 10 Thousand/uL      RBC 3 43 Million/uL      Hemoglobin 10 1 g/dL      Hematocrit 32 4 %      MCV 95 fL      MCH 29 4 pg      MCHC 31 2 g/dL      RDW 14 8 %      MPV 10 0 fL      Platelets 816 Thousands/uL      nRBC 0 /100 WBCs      Neutrophils Relative 70 %      Immat GRANS % 1 %      Lymphocytes Relative 15 %      Monocytes Relative 12 %      Eosinophils Relative 2 %      Basophils Relative 0 % Neutrophils Absolute 4 25 Thousands/µL      Immature Grans Absolute 0 03 Thousand/uL      Lymphocytes Absolute 0 92 Thousands/µL      Monocytes Absolute 0 74 Thousand/µL      Eosinophils Absolute 0 14 Thousand/µL      Basophils Absolute 0 02 Thousands/µL     B-Type Natriuretic Peptide(BNP) [[id number]] Collected: 05/07/22 1550    Lab Status: In process Specimen: Blood from Arm, Left Updated: 05/07/22 1618    Blood culture [[id number]] Collected: 05/07/22 1550    Lab Status: In process Specimen: Blood from Arm, Left Updated: 05/07/22 1616                 CTA ED chest PE Study   Final Result by Janina Serrano MD (05/07 1851)         1  Findings suggestive of mild congestive heart failure  2   No evidence of acute pulmonary embolus, thoracic aortic aneurysm or dissection  Workstation performed: [id number]         X-ray chest 1 view portable   ED Interpretation by Komal Nix PA-C (05/07 1738)   Potential vascular congestion      VAS lower limb venous duplex study, unilateral/limited    (Results Pending)              Procedures  Procedures         ED Course  ED Course as of 05/07/22 1910   Sat May 07, 2022   1631 Ventricular rate 81 beats per minute  AK interval 280 milliseconds  QRS duration 150 milliseconds  QT//500 milliseconds  PRT axes 71, 87, 71,    ECG interpretation-"sinus rhythm first-degree AV block, left bundle-branch block, abnormal ECG"     1637 Patient spo2 was in 80s when I first interviewed patient  Went up to 100% on 3 liters   1654 Per Vascular techDaniela, duplex was "grossly negative for dvt "   1703 hs TnI 0hr: 12   1712 VAS lower limb venous duplex study, unilateral/limited  Evaluation shows no evidence of thrombus in the common femoral vein  Doppler evaluation shows a normal response to augmentation maneuvers  Technically difficult/limited study to body habitus, pitting edema and poor  visualization of calf veins     [address] ED chest PE Study     1  Findings suggestive of mild congestive heart failure  2   No evidence of acute pulmonary embolus, thoracic aortic aneurysm or dissection  1855 Magnesium(!): 1 6 1901 Delta 2hr hsTnI: 1 1902 Patient told me that he works at the [employer], and he was exposed last week to multiple people with Veronica  Patient also said that he felt short of breath going to the bathroom and had to stop             HEART Risk Score      Most Recent Value   Heart Score Risk Calculator    History 1 Filed at: 05/07/2022 1908   ECG 0 Filed at: 05/07/2022 1908   Age 2 Filed at: 05/07/2022 1908   Risk Factors 2 Filed at: 05/07/2022 1908   Troponin 0 Filed at: 05/07/2022 1908   HEART Score 5 Filed at: 05/07/2022 1908                        SBIRT 22yo+      Most Recent Value   SBIRT (23 yo +)    In order to provide better care to our patients, we are screening all of our patients for alcohol and drug use  Would it be okay to ask you these screening questions? Unable to answer at this time Filed at: 05/07/2022 1614                    MDM  Number of Diagnoses or Management Options  CHF (congestive heart failure) ([facility] ): new and requires workup  Hypoxia: new and requires workup  Peripheral edema: established and worsening  SOB (shortness of breath): new and requires workup  Diagnosis management comments: Patient presents with shortness breath for 2 days  Patient was hypoxic on initial exam, put on oxygen  CTA showed no signs of PE, but did show evidence of congestive heart failure  BNP is still pending at this time    Consulted with attending, will give magnesium sulfate, Lasix, and admit patient for further treatment    Counseling: I had a detailed discussion with the patient and/or guardian regarding: the historical points, exam findings, and any diagnostic results supporting the discharge diagnosis, lab results, radiology results, discharge instructions reviewed with patient and/or family/caregiver and understanding was verbalized  Instructions given to return to the emergency department if symptoms worsen or persist, or if there are any questions or concerns that arise at home       All labs reviewed and utilized in the medical decision making process     All radiology studies independently viewed by me and interpreted by the radiologist     Portions of the record may have been created with voice recognition software   Occasional wrong word or "sound a like" substitutions may have occurred due to the inherent limitations of voice recognition software   Read the chart carefully and recognize, using context, where substitutions have occurred  Amount and/or Complexity of Data Reviewed  Clinical lab tests: ordered and reviewed  Tests in the radiology section of CPT®: ordered and reviewed    Risk of Complications, Morbidity, and/or Mortality  Presenting problems: moderate  Diagnostic procedures: minimal  Management options: minimal    Patient Progress  Patient progress: stable      Disposition  Final diagnoses:   CHF (congestive heart failure) ([facility])   SOB (shortness of breath)   Peripheral edema   Hypoxia     Time reflects when diagnosis was documented in both MDM as applicable and the Disposition within this note     Time User Action Codes Description Comment    5/7/2022  7:03 PM Samy Darrick Add [I50 9] CHF (congestive heart failure) ([facility] )     5/7/2022  7:03 PM Samy Darrick Add [R06 02] SOB (shortness of breath)     5/7/2022  7:03 PM Samy Darrick Add [R60 9] Peripheral edema     5/7/2022  7:03 PM Samy Darrick Add [R09 02] Hypoxia       ED Disposition     ED Disposition Condition Date/Time Comment    Admit Stable Sat May 7, 2022  7:03 PM Case was discussed with Dr Neil Fraser and the patient's admission status was agreed to be Admission Status: inpatient status to the service of Dr Neil Fraser           Follow-up Information    None         Patient's Medications   Discharge Prescriptions    No medications on file       No discharge procedures on file      PDMP Review     None          ED Provider  Electronically Signed by           Paul Durbin PA-C  05/07/22 4213

## 2022-05-07 NOTE — ASSESSMENT & PLAN NOTE
· Extensive history of DVT with chronic venous insufficiency in both legs  · Uses compression pumps and stockings regularly and is on [facility] with Warfarin  · INR today is pending    Will follow-up on results and place order for Coumadin accordingly

## 2022-05-08 ENCOUNTER — APPOINTMENT (INPATIENT)
Dept: NON INVASIVE DIAGNOSTICS | Facility: HOSPITAL | Age: 87
DRG: 291 | End: 2022-05-08
Payer: MEDICARE

## 2022-05-08 LAB
ANION GAP SERPL CALCULATED.3IONS-SCNC: 12 MMOL/L (ref 4–13)
AORTIC ROOT: 2.8 CM
AORTIC VALVE MEAN VELOCITY: 14.8 M/S
APICAL FOUR CHAMBER EJECTION FRACTION: 66 %
ASCENDING AORTA: 3.2 CM (ref 2.41–3.62)
ATRIAL RATE: 83 BPM
AV LVOT MEAN GRADIENT: 2 MMHG
AV LVOT PEAK GRADIENT: 3 MMHG
AV MEAN GRADIENT: 10 MMHG
AV PEAK GRADIENT: 20 MMHG
AV VELOCITY RATIO: 0.41
BUN SERPL-MCNC: 31 MG/DL (ref 5–25)
CALCIUM SERPL-MCNC: 9.1 MG/DL (ref 8.4–10.2)
CHLORIDE SERPL-SCNC: 103 MMOL/L (ref 96–108)
CO2 SERPL-SCNC: 23 MMOL/L (ref 21–32)
CREAT SERPL-MCNC: 1.37 MG/DL (ref 0.6–1.3)
DOP CALC AO PEAK VEL: 2.22 M/S
DOP CALC AO VTI: 42.4 CM
DOP CALC LVOT PEAK VEL VTI: 21.29 CM
DOP CALC LVOT PEAK VEL: 0.92 M/S
FRACTIONAL SHORTENING: 43 % (ref 28–44)
GFR SERPL CREATININE-BSD FRML MDRD: 46 ML/MIN/1.73SQ M
GLUCOSE SERPL-MCNC: 132 MG/DL (ref 65–140)
GLUCOSE SERPL-MCNC: 132 MG/DL (ref 65–140)
GLUCOSE SERPL-MCNC: 142 MG/DL (ref 65–140)
GLUCOSE SERPL-MCNC: 143 MG/DL (ref 65–140)
GLUCOSE SERPL-MCNC: 156 MG/DL (ref 65–140)
INTERVENTRICULAR SEPTUM IN DIASTOLE (PARASTERNAL SHORT AXIS VIEW): 1.2 CM
INTERVENTRICULAR SEPTUM: 1.2 CM (ref 0.61–1.15)
LEFT ATRIUM AREA SYSTOLE SINGLE PLANE A4C: 21.5 CM2
LEFT ATRIUM SIZE: 4.4 CM
LEFT INTERNAL DIMENSION IN SYSTOLE: 3.3 CM (ref 20.19–30.64)
LEFT VENTRICLE DIASTOLIC VOLUME (MOD BIPLANE): 123 ML (ref 148.15–333.66)
LEFT VENTRICLE SYSTOLIC VOLUME (MOD BIPLANE): 40 ML
LEFT VENTRICULAR INTERNAL DIMENSION IN DIASTOLE: 5.8 CM (ref 35.8–53.4)
LEFT VENTRICULAR POSTERIOR WALL IN END DIASTOLE: 1.2 CM (ref 0.6–1.13)
LEFT VENTRICULAR STROKE VOLUME: 122 ML
LV EF: 68 %
LVSV (TEICH): 122 ML
MV STENOSIS PRESSURE HALF TIME: 77 MS
MV VALVE AREA P 1/2 METHOD: 2.86 CM2
P AXIS: 53 DEGREES
PA SYSTOLIC PRESSURE: 70 MMHG
POTASSIUM SERPL-SCNC: 4 MMOL/L (ref 3.5–5.3)
PR INTERVAL: 280 MS
QRS AXIS: 48 DEGREES
QRSD INTERVAL: 164 MS
QT INTERVAL: 446 MS
QTC INTERVAL: 512 MS
RIGHT ATRIUM AREA SYSTOLE A4C: 19.4 CM2
RIGHT VENTRICLE ID DIMENSION: 4.4 CM
SL CV LV DIAS VOL ENDO Z SCORE: -2.54
SL CV LV EF: 60
SL CV PED ECHO LEFT VENTRICLE DIASTOLIC VOLUME (MOD BIPLANE) 2D: 166 ML
SL CV PED ECHO LEFT VENTRICLE SYSTOLIC VOLUME (MOD BIPLANE) 2D: 44 ML
SODIUM SERPL-SCNC: 138 MMOL/L (ref 135–147)
T WAVE AXIS: 66 DEGREES
TR MAX PG: 69 MMHG
TR PEAK VELOCITY: 4.2 M/S
TRICUSPID VALVE PEAK REGURGITATION VELOCITY: 4.15 M/S
VENTRICULAR RATE: 79 BPM
Z-SCORE OF ASCENDING AORTA: 0.6
Z-SCORE OF INTERVENTRICULAR SEPTUM IN END DIASTOLE: 2.34
Z-SCORE OF LEFT VENTRICULAR DIMENSION IN END DIASTOLE: -20.2
Z-SCORE OF LEFT VENTRICULAR DIMENSION IN END SYSTOLE: -15.97
Z-SCORE OF LEFT VENTRICULAR POSTERIOR WALL IN END DIASTOLE: 2.45

## 2022-05-08 PROCEDURE — 93306 TTE W/DOPPLER COMPLETE: CPT

## 2022-05-08 PROCEDURE — 99232 SBSQ HOSP IP/OBS MODERATE 35: CPT | Performed by: PHYSICIAN ASSISTANT

## 2022-05-08 PROCEDURE — 93971 EXTREMITY STUDY: CPT | Performed by: SURGERY

## 2022-05-08 PROCEDURE — 93010 ELECTROCARDIOGRAM REPORT: CPT | Performed by: INTERNAL MEDICINE

## 2022-05-08 PROCEDURE — 82948 REAGENT STRIP/BLOOD GLUCOSE: CPT

## 2022-05-08 PROCEDURE — 80048 BASIC METABOLIC PNL TOTAL CA: CPT | Performed by: INTERNAL MEDICINE

## 2022-05-08 PROCEDURE — 93306 TTE W/DOPPLER COMPLETE: CPT | Performed by: INTERNAL MEDICINE

## 2022-05-08 PROCEDURE — 99222 1ST HOSP IP/OBS MODERATE 55: CPT | Performed by: INTERNAL MEDICINE

## 2022-05-08 RX ORDER — FUROSEMIDE 10 MG/ML
40 INJECTION INTRAMUSCULAR; INTRAVENOUS ONCE
Status: COMPLETED | OUTPATIENT
Start: 2022-05-08 | End: 2022-05-08

## 2022-05-08 RX ORDER — FUROSEMIDE 10 MG/ML
80 INJECTION INTRAMUSCULAR; INTRAVENOUS 2 TIMES DAILY
Status: DISCONTINUED | OUTPATIENT
Start: 2022-05-08 | End: 2022-05-09

## 2022-05-08 RX ADMIN — FUROSEMIDE 40 MG: 10 INJECTION, SOLUTION INTRAMUSCULAR; INTRAVENOUS at 08:44

## 2022-05-08 RX ADMIN — B-COMPLEX W/ C & FOLIC ACID TAB 1 TABLET: TAB at 08:45

## 2022-05-08 RX ADMIN — SOTALOL HYDROCHLORIDE 80 MG: 80 TABLET ORAL at 21:11

## 2022-05-08 RX ADMIN — ATORVASTATIN CALCIUM 40 MG: 40 TABLET, FILM COATED ORAL at 17:13

## 2022-05-08 RX ADMIN — WARFARIN SODIUM 4 MG: 4 TABLET ORAL at 17:13

## 2022-05-08 RX ADMIN — FUROSEMIDE 40 MG: 10 INJECTION, SOLUTION INTRAMUSCULAR; INTRAVENOUS at 12:14

## 2022-05-08 RX ADMIN — PANTOPRAZOLE SODIUM 40 MG: 40 TABLET, DELAYED RELEASE ORAL at 04:45

## 2022-05-08 RX ADMIN — POTASSIUM CHLORIDE 20 MEQ: 1500 TABLET, EXTENDED RELEASE ORAL at 17:13

## 2022-05-08 RX ADMIN — Medication 2000 UNITS: at 08:43

## 2022-05-08 RX ADMIN — FUROSEMIDE 80 MG: 10 INJECTION, SOLUTION INTRAMUSCULAR; INTRAVENOUS at 17:13

## 2022-05-08 RX ADMIN — SOTALOL HYDROCHLORIDE 80 MG: 80 TABLET ORAL at 08:43

## 2022-05-08 NOTE — ASSESSMENT & PLAN NOTE
Wt Readings from Last 3 Encounters:   05/08/22 (!) 149 kg (329 lb)   04/18/22 (!) 159 kg (350 lb 12 8 oz)   04/04/22 (!) 150 kg (330 lb)     · POA, history of diastolic heart failure on torsemide 20 mg twice daily  · Patient on advise of his pharmacist discontinued torsemide about 1 month ago after he was started on blood pressure medication with HCTZ  · Presents with 1 day history shortness of breath especially with exertion  · CTA chest revealed "1  Findings suggestive of mild congestive heart failure   2  No evidence of acute pulmonary embolus, thoracic aortic aneurysm or dissection"  · Last echocardiogram was in November 2020 which showed EF 55%, mild to moderately dilated left and right atrium, bioprosthetic AV  · Cardiology consultation appreciated   · Increase Lasix to 80 mg IV BID  · Continue I/O, daily weights  · Cardiac   · Follow up ECHO  · Likely transition to PO Torsemide tomorrow

## 2022-05-08 NOTE — ASSESSMENT & PLAN NOTE
· Extensive history of DVT with chronic venous insufficiency in both legs  · Uses compression pumps and stockings regularly and is on [facility] with Warfarin  · INR therapeutic  · Continue Coumadin

## 2022-05-08 NOTE — PLAN OF CARE
Problem: Potential for Falls  Goal: Patient will remain free of falls  Description: INTERVENTIONS:  - Educate patient/family on patient safety including physical limitations  - Instruct patient to call for assistance with activity   - Consult OT/PT to assist with strengthening/mobility   - Keep Call bell within reach  - Keep bed low and locked with side rails adjusted as appropriate  - Keep care items and personal belongings within reach  - Initiate and maintain comfort rounds  - Make Fall Risk Sign visible to staff  - Offer Toileting every 2 Hours, in advance of need  - Initiate/Maintain call bell alarm  - Obtain necessary fall risk management equipment: call bell alarm  - Apply yellow socks and bracelet for high fall risk patients  - Consider moving patient to room near nurses station  Outcome: Progressing     Problem: PAIN - ADULT  Goal: Verbalizes/displays adequate comfort level or baseline comfort level  Description: Interventions:  - Encourage patient to monitor pain and request assistance  - Assess pain using appropriate pain scale  - Administer analgesics based on type and severity of pain and evaluate response  - Implement non-pharmacological measures as appropriate and evaluate response  - Consider cultural and social influences on pain and pain management  - Notify physician/advanced practitioner if interventions unsuccessful or patient reports new pain  Outcome: Progressing     Problem: INFECTION - ADULT  Goal: Absence or prevention of progression during hospitalization  Description: INTERVENTIONS:  - Assess and monitor for signs and symptoms of infection  - Monitor lab/diagnostic results  - Monitor all insertion sites, i e  indwelling lines, tubes, and drains  - Monitor endotracheal if appropriate and nasal secretions for changes in amount and color  - [place] appropriate cooling/warming therapies per order  - Administer medications as ordered  - Instruct and encourage patient and family to use good hand hygiene technique  - Identify and instruct in appropriate isolation precautions for identified infection/condition  Outcome: Progressing  Goal: Absence of fever/infection during neutropenic period  Description: INTERVENTIONS:  - Monitor WBC    Outcome: Progressing     Problem: SAFETY ADULT  Goal: Patient will remain free of falls  Description: INTERVENTIONS:  - Educate patient/family on patient safety including physical limitations  - Instruct patient to call for assistance with activity   - Consult OT/PT to assist with strengthening/mobility   - Keep Call bell within reach  - Keep bed low and locked with side rails adjusted as appropriate  - Keep care items and personal belongings within reach  - Initiate and maintain comfort rounds  - Make Fall Risk Sign visible to staff  - Offer Toileting every 2 Hours, in advance of need  - Initiate/Maintain call bell alarm  - Obtain necessary fall risk management equipment: call bell alarm/bed alarm  - Apply yellow socks and bracelet for high fall risk patients  - Consider moving patient to room near nurses station  Outcome: Progressing  Goal: Maintain or return to baseline ADL function  Description: INTERVENTIONS:  -  Assess patient's ability to carry out ADLs; assess patient's baseline for ADL function and identify physical deficits which impact ability to perform ADLs (bathing, care of mouth/teeth, toileting, grooming, dressing, etc )  - Assess/evaluate cause of self-care deficits   - Assess range of motion  - Assess patient's mobility; develop plan if impaired  - Assess patient's need for assistive devices and provide as appropriate  - Encourage maximum independence but intervene and supervise when necessary  - Involve family in performance of ADLs  - Assess for home care needs following discharge   - Consider OT consult to assist with ADL evaluation and planning for discharge  - Provide patient education as appropriate  Outcome: Progressing  Goal: Maintains/Returns to pre admission functional level  Description: INTERVENTIONS:  - Perform BMAT or MOVE assessment daily    - Set and communicate daily mobility goal to care team and patient/family/caregiver  - Collaborate with rehabilitation services on mobility goals if consulted  - Perform Range of Motion 2 times a day  - Reposition patient every 2 hours  - Dangle patient 2 times a day  - Stand patient 2 times a day  - Ambulate patient 2 times a day  - Out of bed to chair 2 times a day   - Out of bed for meals 2 times a day  - Out of bed for toileting  - Record patient progress and toleration of activity level   Outcome: Progressing     Problem: DISCHARGE PLANNING  Goal: Discharge to home or other facility with appropriate resources  Description: INTERVENTIONS:  - Identify barriers to discharge w/patient and caregiver  - Arrange for needed discharge resources and transportation as appropriate  - Identify discharge learning needs (meds, wound care, etc )  - Arrange for interpretive services to assist at discharge as needed  - Refer to Case Management Department for coordinating discharge planning if the patient needs post-hospital services based on physician/advanced practitioner order or complex needs related to functional status, cognitive ability, or social support system  Outcome: Progressing     Problem: Knowledge Deficit  Goal: Patient/family/caregiver demonstrates understanding of disease process, treatment plan, medications, and discharge instructions  Description: Complete learning assessment and assess knowledge base    Interventions:  - Provide teaching at level of understanding  - Provide teaching via preferred learning methods  Outcome: Progressing

## 2022-05-08 NOTE — CONSULTS
Consultation - Cardiology Team One  Zander Shaffer 81 y o  male MRN: [id number]  Unit/Bed#: S -01 Encounter: [id number]    Consults    Physician Requesting Consult: Callum Ahmadi MD  Reason for Consult / Principal Problem:  CHF    Assessment:    1  Acute on chronic HFpEF:  Presents with progressive shortness of breath and lower extremity edema in the setting of torsemide discontinuation 1 month ago  CXR and CTA with vascular congestion    Given 60 mg IV Lasix in the ED and started on 40 IV BID  · Echocardiogram 11/17/2020:  EF 55% with no obvious wall motion abnormalities but this cannot be excluded on the basis of this study, mild concentric LVH, mildly dilated RV, mild-moderate SID, bioprosthetic aortic valve with normal function, mild MR, mild TR  · Home diuretic regimen:  HCTZ only  Torsemide 20 mg BID was stopped 1 month ago when he went on a combination candesartan-HCTZ pill  · Dry weight:  330 lb per office visit 01/2022  · Weight on admission:  352 lb  · Weight today:  329 lb per standing scale  · Net output: -1 2 L  · Continues to appear volume overloaded on exam with lower extremity edema, abdominal bloating  2  History of Endocarditis s/p bioprosthetic AVR:  Normal valve function on echocardiogram 11/2020  3  History of VT s/p ICD:  MDT DC ICD (NOT MRI conditional)  Device interrogation 03/2022 with no significant high rate episodes and normal device function  4  PAF:  Currently maintaining NSR on sotalol 80 mg BID  Anticoagulated on Coumadin with INR 2 44   5  Essential hypertension:  Average /73  6  Hyperlipidemia:  Maintained on atorvastatin 40 mg daily  7  History of DVT:  Maintained on Coumadin        Plan/Recommendations:  · Follow-up on echocardiogram to evaluate heart function and valvular status  · Increase IV Lasix to 80 mg BID today and may be able to transition to oral torsemide tomorrow  · Monitor I&Os, renal function, electrolytes and standing weight  · Maintain potassium >4 and magnesium >2  · Low-sodium, fluid-restricted diet  · Continue remaining cardiac medications  · Wean oxygen as able  _____________________________________________________________________________________    CC:  Shortness of breath and lower extremity edema      History of Present Illness   HPI: Era Blair is a 81y o  year old male who has history of endocarditis s/p bioprosthetic aortic valve replacement, VT a/p ICD, PAF, chronic diastolic CHF, essential hypertension, history of DVT who follows with cardiologist Dr Ash Mcdaniel  Patient presented to the emergency room at [facility] on 05/07/2022 with complaints of worsening shortness of breath and lower extremity edema over the last 2 days  On arrival to the ED patient's BP was 176/771 oxygen saturation 99% on 3 L  EKG revealed sinus rhythm with LBBB  CXR revealed cardiomegaly with mild vascular congestion  Right lower extremity venous duplex was negative for DVT or superficial thrombophlebitis  CTA chest was negative for PE, thoracic aortic aneurysm or dissection with mild congestive heart failure  Labs revealed creatinine 1 51 otherwise stable CMP, HS troponin negative x2, , unremarkable lactic acid and procalcitonin, hemoglobin 10 otherwise stable CBC, INR 2 44  Patient was given 60 mg IV Lasix in the ED and started on 40 IV BID for acute on chronic CHF exacerbation  Cardiology has been consulted for further evaluation management of CHF  Home medication regimen includes atorvastatin 40 mg daily, candesartan-HCTZ 16-12 5 mg daily, sotalol 80 mg BID, torsemide 20 mg BID, Coumadin  Patient resting in bed during consultation and states that approximately 1 month ago his torsemide was discontinued when he was put on candesartan-HCTZ combination medication  He does not weigh himself daily but has slowly noticed a slow weight gain    Since admission he feels his breathing has improved but is still not at baseline  Denies any chest pain, palpitations, lightheadedness or dizziness  He continues with edema though it is slightly improved  ICD interrogation 03/29/2022:  MDT DC ICD (NOT MRI conditional)  AP 0 4%,  0 1% no significant high rate episodes, normal device function  Echocardiogram 11/17/2020:  EF 55% with no obvious wall motion abnormalities but this cannot be excluded on the basis of this study, mild concentric LVH, mildly dilated RV, mild-moderate SID, bioprosthetic aortic valve with normal function, mild MR, mild TR  EKG reviewed personally: 5/7/2022-sinus rhythm with first-degree AVB 79 beats per minute with LBBB  No significant change when compared to the EKG from 11/05/2020  Telemetry reviewed personally:  NSR        Review of Systems   Constitutional: Negative  Negative for chills  Cardiovascular: Positive for dyspnea on exertion and leg swelling  Negative for chest pain, near-syncope, orthopnea, palpitations, paroxysmal nocturnal dyspnea and syncope  Respiratory: Negative  Negative for cough, shortness of breath and wheezing  Endocrine: Negative  Hematologic/Lymphatic: Negative  Skin: Negative  Musculoskeletal: Negative  Gastrointestinal: Negative  Negative for diarrhea, nausea and vomiting  Neurological: Negative for dizziness, light-headedness and weakness  Psychiatric/Behavioral: Negative  Negative for altered mental status  All other systems reviewed and are negative      Historical Information   Past Medical History:   Diagnosis Date    Anemia     Arthritis     Atrial fibrillation ([facility] )     Basal cell carcinoma 03/22/2022    Tip of Nose    CHF (congestive heart failure) (HCC)     Diabetes mellitus ([facility] )     Niddm    DVT (deep vein thrombosis) in pregnancy 1966    not in pregnancy    DVT (deep venous thrombosis) ([facility] ) 1966    Dyslipidemia     GERD (gastroesophageal reflux disease)     Hyperlipidemia     Hypertension     Irregular heart beat     Afib    Morbid obesity due to excess calories ([facility] )     Resolved 9/2/2014     Pulmonary embolism (HCC)     Sepsis ([facility] )     Squamous cell skin cancer 07/30/2020    Left posterior scalp    Visual impairment      Past Surgical History:   Procedure Laterality Date    AORTIC VALVE REPLACEMENT      CARDIAC DEFIBRILLATOR PLACEMENT  04/2014   Reji Alonzo CARDIAC SURGERY  02/2014    AVR    COLONOSCOPY      INSERT / REPLACE / REMOVE PACEMAKER      JOINT REPLACEMENT Left     LTKR    MOHS SURGERY  07/30/2020    Left posterior scalp, Dr Hanson Foots  04/18/2022    [address]  [employer] Tip of Nose- Dr Ryan Mancera SEP-FEM JUNC Right 8/17/2018    Procedure: LEG PERFORATED INJECTION SCLEROTHERAPY;  Surgeon: Martina Wright MD;  [facility];  Service: Vascular    REPLACEMENT TOTAL KNEE Right     TOTAL KNEE ARTHROPLASTY Left     VASCULAR SURGERY      VENA CAVA FILTER PLACEMENT      Interruption inferior vena cava, [place] filter, placement    WISDOM TOOTH EXTRACTION       Social History     Substance and Sexual Activity   Alcohol Use Not Currently     Social History     Substance and Sexual Activity   Drug Use No     Social History     Tobacco Use   Smoking Status Never Smoker   Smokeless Tobacco Never Used     Family History:   Family History   Problem Relation Age of Onset    Arthritis Mother     Stroke Mother     Arthritis Father     Cancer Father     Arthritis Daughter        Meds/Allergies   all current active meds have been reviewed, current meds:   Current Facility-Administered Medications   Medication Dose Route Frequency    acetaminophen (TYLENOL) tablet 650 mg  650 mg Oral Q6H PRN    atorvastatin (LIPITOR) tablet 40 mg  40 mg Oral After Dinner    cholecalciferol (VITAMIN D3) tablet 2,000 Units  2,000 Units Oral Daily    fluticasone (FLONASE) 50 mcg/act nasal spray 2 spray  2 spray Each Nare Daily    furosemide (LASIX) injection 40 mg  40 mg Intravenous BID    insulin lispro (HumaLOG) 100 units/mL subcutaneous injection 1-5 Units  1-5 Units Subcutaneous HS    insulin lispro (HumaLOG) 100 units/mL subcutaneous injection 2-12 Units  2-12 Units Subcutaneous TID AC    multivitamin stress formula tablet 1 tablet  1 tablet Oral Daily    pantoprazole (PROTONIX) EC tablet 40 mg  40 mg Oral Early Morning    potassium chloride (K-DUR,KLOR-CON) CR tablet 20 mEq  20 mEq Oral After Dinner    sodium chloride (PF) 0 9 % injection 3 mL  3 mL Intravenous Q1H PRN    sotalol (BETAPACE) tablet 80 mg  80 mg Oral Q12H    warfarin (COUMADIN) tablet 4 mg  4 mg Oral Once per day on Sun Mon Wed Thu Sat    [START ON 5/10/2022] warfarin (COUMADIN) tablet 6 mg  6 mg Oral Once per day on Tue Fri    and PTA meds:   Prior to Admission Medications   Prescriptions Last Dose Informant Patient Reported? Taking?    B Complex-C (SUPER B COMPLEX PO)  Self Yes Yes   Sig: Take 1 capsule by mouth daily    HYDROcodone-acetaminophen (NORCO) 5-325 mg per tablet More than a month at Unknown time  Yes No   Sig: Take 1 tablet by mouth every 6 (six) hours as needed     Iron Combinations (NIFEREX) TABS  Self Yes Yes   Sig: Take one tablet daily   Multiple Vitamin (MULTIVITAMINS PO)  Self Yes Yes   Sig: Take 1 tablet by mouth daily   acetaminophen (TYLENOL) 325 mg tablet Past Month at Unknown time Self No Yes   Sig: Take 2 tablets by mouth every 6 (six) hours as needed for mild pain or fever   albuterol (PROVENTIL HFA,VENTOLIN HFA) 90 mcg/act inhaler Past Month at Unknown time Self Yes Yes   Sig: INHALE 2 PUFFS BY MOUTH EVERY 6 HOURS AS NEEDED FOR WHEEZING OR SHORTNESS OF BREATH   amoxicillin (AMOXIL) 500 mg capsule More than a month at Unknown time Self Yes No   Sig: TK 4 CS BEFORE 2 HOURS BEFORE DENTAL OR FACIAL SURGERY WORK   atorvastatin (LIPITOR) 40 mg tablet  Self Yes Yes   Sig: Take 40 mg by mouth daily after dinner Atorvastatin Calcium 40 MG Oral Tablet Take 1 tablet daily  Refills: 0 Active    candesartan (ATACAND) 16 mg tablet  Self No Yes   Sig: Take 1 tablet (16 mg total) by mouth daily   candesartan-hydrochlorothiazide (ATACAND HCT) 16-12 5 MG per tablet   Yes Yes   Sig: Take 1 tablet by mouth daily   cholecalciferol (VITAMIN D3) 1,000 units tablet  Self Yes Yes   Sig: Take 2,000 Units by mouth daily   fluticasone (FLONASE) 50 mcg/act nasal spray Past Month at Unknown time Self Yes Yes   Si sprays as needed Shake liquid and spray    gabapentin (NEURONTIN) 100 mg capsule   Yes Yes   hydrochlorothiazide (HYDRODIURIL) 25 mg tablet  Self Yes Yes   Sig: Take 25 mg by mouth daily    meloxicam (MOBIC) 15 mg tablet  Self Yes Yes   Sig: Take by mouth daily     metFORMIN (GLUCOPHAGE) 1000 MG tablet  Self Yes Yes   Sig: Take 1,000 mg by mouth daily after dinner MetFORMIN HCl 1000 MG (MOD) TB24 Take one tablet daily  Refills: 0  Active    mupirocin (BACTROBAN) 2 % ointment Past Month at Unknown time Self Yes Yes   Sig: as needed    omeprazole (PriLOSEC) 20 mg delayed release capsule  Self Yes Yes   Sig: Omeprazole 20 MG Oral Tablet Delayed Release Take 1 tablet daily  Refills: 0  Active   polyethylene glycol (GLYCOLAX) 17 GM/SCOOP powder   Yes Yes   Sig: Take 17 g by mouth 2 (two) times a day   potassium chloride (K-DUR,KLOR-CON) 20 mEq tablet  Self Yes Yes   Sig: Take 20 mEq by mouth daily after dinner Potassium Chloride Jesusita ER 20 MEQ Oral Tablet Extended Release Take 1 tablet daily  Refills: 0  Active    sotalol (BETAPACE) 80 mg tablet  Self Yes Yes   Sig: Take 80 mg by mouth every 12 (twelve) hours    torsemide (DEMADEX) 20 mg tablet  Self No Yes   Sig: TAKE 1 TABLET(20 MG) BY MOUTH TWICE DAILY   warfarin (COUMADIN) 4 mg tablet  Self No Yes   Sig: Take 1 tablet (4 mg total) by mouth 2 (two) times a week Patient take 4mg everyday but Tuesday and Friday     Patient taking differently: Take 4 mg by mouth 2 (two) times a week 4 or 6 mg as directed   warfarin (COUMADIN) 6 mg tablet  Self Yes Yes Sig: Take 6 mg by mouth The patient takes on Tuesday and Fridays      Facility-Administered Medications: None          Allergies   Allergen Reactions    Tramadol Other (See Comments)     intolerance       Objective   Vitals: Blood pressure 117/77, pulse 85, temperature 98 3 °F (36 8 °C), temperature source Oral, resp  rate 18, height 6' 1" (1 854 m), weight (!) 149 kg (329 lb), SpO2 96 %  ,     Body mass index is 43 41 kg/m²  ,     Systolic (36JZE), PWL:490 , Min:114 , XZF:826     Diastolic (61EXN), PPE:70, Min:58, Max:80    Wt Readings from Last 3 Encounters:   05/08/22 (!) 149 kg (329 lb)   04/18/22 (!) 159 kg (350 lb 12 8 oz)   04/04/22 (!) 150 kg (330 lb)      Lab Results   Component Value Date    CREATININE 1 37 (H) 05/08/2022    CREATININE 1 51 (H) 05/07/2022    CREATININE 1 25 11/05/2020         Intake/Output Summary (Last 24 hours) at 5/8/2022 1040  Last data filed at 5/8/2022 0601  Gross per 24 hour   Intake 480 ml   Output 1750 ml   Net -1270 ml     Weight (last 2 days)     Date/Time Weight    05/08/22 0810 149 (329)    05/08/22 0600 149 (329 37)    05/08/22 0300 149 (329 37)    05/07/22 2115 156 (344 58)    05/07/22 2004 156 (344 58)    05/07/22 1600 160 (352)        Invasive Devices  Report    Peripheral Intravenous Line            Peripheral IV 05/07/22 Right Antecubital <1 day                  Physical Exam  Vitals and nursing note reviewed  Constitutional:       General: He is not in acute distress  Appearance: He is well-developed  He is obese  Comments: On 2 L NC in NAD   HENT:      Head: Normocephalic and atraumatic  Neck:      Vascular: No JVD  Cardiovascular:      Rate and Rhythm: Normal rate and regular rhythm  Heart sounds: Normal heart sounds  No murmur heard  No friction rub  No gallop  Pulmonary:      Effort: Pulmonary effort is normal  No respiratory distress  Breath sounds: No wheezing or rales        Comments: Diminished breath sounds at the bases bilaterally  Chest:      Chest wall: No tenderness  Abdominal:      General: Bowel sounds are normal  There is distension  Palpations: Abdomen is soft  Tenderness: There is no abdominal tenderness  Musculoskeletal:         General: No tenderness  Normal range of motion  Cervical back: Normal range of motion and neck supple  Right lower leg: Edema present  Left lower leg: Edema present  Skin:     General: Skin is warm and dry  Coloration: Skin is not pale  Findings: No erythema  Neurological:      Mental Status: He is alert and oriented to person, place, and time  Psychiatric:         Mood and Affect: Mood normal          Behavior: Behavior normal          Thought Content:  Thought content normal          Judgment: Judgment normal            LABORATORY RESULTS:      CBC with diff:   Results from last 7 days   Lab Units 05/07/22  1550   WBC Thousand/uL 6 10   HEMOGLOBIN g/dL 10 1*   HEMATOCRIT % 32 4*   MCV fL 95   PLATELETS Thousands/uL 221   MCH pg 29 4   MCHC g/dL 31 2*   RDW % 14 8   MPV fL 10 0   NRBC AUTO /100 WBCs 0       CMP:  Results from last 7 days   Lab Units 05/08/22  0452 05/07/22  1550   POTASSIUM mmol/L 4 0 4 4   CHLORIDE mmol/L 103 106   CO2 mmol/L 23 25   BUN mg/dL 31* 33*   CREATININE mg/dL 1 37* 1 51*   CALCIUM mg/dL 9 1 8 9   AST U/L  --  15   ALT U/L  --  13   ALK PHOS U/L  --  61   EGFR ml/min/1 73sq m 46 41       BMP:  Results from last 7 days   Lab Units 05/08/22  0452 05/07/22  1550   POTASSIUM mmol/L 4 0 4 4   CHLORIDE mmol/L 103 106   CO2 mmol/L 23 25   BUN mg/dL 31* 33*   CREATININE mg/dL 1 37* 1 51*   CALCIUM mg/dL 9 1 8 9          No results found for: NTBNP         Results from last 7 days   Lab Units 05/07/22  1550   MAGNESIUM mg/dL 1 6*         Results from last 7 days   Lab Units 05/07/22  1954   INR  2 44*     Lipid Profile:   No results found for: CHOL  No results found for: HDL  No results found for: LDLCALC  No results found for: TRIG      Cardiac testing:   Results for orders placed during the hospital encounter of 20    Echo complete with contrast if indicated    Narrative  [address]  [phone]    Transthoracic Echocardiogram  2D, M-mode, Doppler, and Color Doppler    Study date:  2020    Patient: Antoinette Leong  MR number: [id number]  Account number: [de-identified]  : 1935  Age: 81 years  Gender: Male  Status: Outpatient  Location: [address] [place] [address] [facility] [place]  Height: 73 in  Weight: 351 3 lb  BP: 152/ 73 mmHg    Indications: PAF; Acute on chronic diastolic HF;s/p AVR  Diagnoses: I35 9 - Nonrheumatic aortic valve disorder, unspecified, I48 0 - Atrial fibrillation, I50 9 - Heart failure, unspecified    Sonographer:  ABBY Felipe  Interpreting Physician:  Evelia Doll MD  Primary Physician:  Roark Brunner, DO  Referring Physician:  Mariano Sullivan MD  Group:  [id number]  Cardiology Fellow:  Dayana Ndiaye DO    SUMMARY    PROCEDURE INFORMATION:  This was a technically difficult study  Echocardiographic views were limited due to restricted patient mobility, poor patient compliance, poor acoustic window availability, decreased penetration, and lung interference  LEFT VENTRICLE:  Systolic function was normal  Ejection fraction was estimated to be 55 %  Although no diagnostic regional wall motion abnormality was identified, this possibility cannot be completely excluded on the basis of this study  Wall thickness was mildly to moderately increased  There was mild concentric hypertrophy  RIGHT VENTRICLE:  The ventricle was mildly dilated  Wall thickness was mildly increased  LEFT ATRIUM:  The atrium was mildly to moderately dilated  RIGHT ATRIUM:  The atrium was mildly to moderately dilated  MITRAL VALVE:  There was mild to moderate annular calcification  There was mild regurgitation      AORTIC VALVE:  A bioprosthesis was present  It exhibited normal function  The peak valve velocity was 197 cm/s  The mean valve velocity was 152 cm/s  Valve peak gradient was 16 mmHg  Valve mean gradient was 9 mmHg  Estimated aortic valve area (by VTI) was 2 16 cmï¾²  TRICUSPID VALVE:  There was mild regurgitation  Pulmonary artery systolic pressure was mildly increased  Estimated peak PA pressure was 40 mmHg  PULMONIC VALVE:  There was mild regurgitation  HISTORY: PRIOR HISTORY: HTN;s/p ICD;VT;SOB; Morbid obesity  PROCEDURE: The study was performed in the [address] [facility] [place]  This was a routine study  The transthoracic approach was used  The study included complete 2D imaging, M-mode, complete spectral Doppler, and color Doppler  The  heart rate was 67 bpm, at the start of the study  Images were obtained from the parasternal, apical, subcostal, and suprasternal notch acoustic windows  Echocardiographic views were limited due to restricted patient mobility, poor patient  compliance, poor acoustic window availability, decreased penetration, and lung interference  This was a technically difficult study  LEFT VENTRICLE: Size was normal  Systolic function was normal  Ejection fraction was estimated to be 55 %  Although no diagnostic regional wall motion abnormality was identified, this possibility cannot be completely excluded on the basis  of this study  Wall thickness was mildly to moderately increased  There was mild concentric hypertrophy  RIGHT VENTRICLE: The ventricle was mildly dilated  Systolic function was low normal  Wall thickness was mildly increased  A pacing wire was present in the ventricular cavity  LEFT ATRIUM: The atrium was mildly to moderately dilated  RIGHT ATRIUM: The atrium was mildly to moderately dilated  MITRAL VALVE: There was mild to moderate annular calcification  There was normal leaflet separation   DOPPLER: The transmitral velocity was within the normal range  There was no evidence for stenosis  There was mild regurgitation  AORTIC VALVE: A bioprosthesis was present  It exhibited normal function  TRICUSPID VALVE: The valve structure was normal  There was normal leaflet separation  DOPPLER: The transtricuspid velocity was within the normal range  There was no evidence for stenosis  There was mild regurgitation  Pulmonary artery  systolic pressure was mildly increased  Estimated peak PA pressure was 40 mmHg  PULMONIC VALVE: Leaflets exhibited normal thickness, no calcification, and normal cuspal separation  DOPPLER: The transpulmonic velocity was within the normal range  There was mild regurgitation  PERICARDIUM: There was no pericardial effusion  AORTA: The root exhibited normal size  SYSTEMIC VEINS: IVC: The inferior vena cava was not well visualized      MEASUREMENT TABLES    2D MEASUREMENTS  LVOT   (Reference normals)  Diam   23 mm   (--)    DOPPLER MEASUREMENTS  LVOT   (Reference normals)  Peak annie   101 cm/s   (--)  Mean annie   70 cm/s   (--)  VTI   23 cm   (--)  Peak gradient   4 mmHg   (--)  Mean gradient   2 2 mmHg   (--)  Stroke vol   95 56 ml   (--)  Aortic valve   (Reference normals)  Peak annie   197 cm/s   (--)  Mean annie   152 cm/s   (--)  VTI   44 cm   (--)  Peak gradient   16 mmHg   (--)  Mean gradient   9 mmHg   (--)  Obstr index, VTI   0 52    (--)  Valve area, VTI   2 16 cmï¾²   (--)  Area index, VTI   0 79 cmï¾²/mï¾²   (--)  Obstr index, Vmax   0 51    (--)  Valve area, Vmax   2 12 cmï¾²   (--)  Area index, Vmax   0 77 cmï¾²/mï¾²   (--)  Obstr index, Vmean   0 46    (--)  Valve area, Vmean   1 91 cmï¾²   (--)  Area index, Vmean   0 7 cmï¾²/mï¾²   (--)    SYSTEM MEASUREMENT TABLES    2D  %FS: 22 82 %  Ao Diam: 2 49 cm  EDV(Teich): 57 38 ml  EF(Teich): 46 66 %  ESV(Teich): 30 61 ml  IVSd: 1 96 cm  LA Area: 26 79 cm2  LA Diam: 4 97 cm  LVEDV MOD A4C: 112 1 ml  LVEF MOD A4C: 50 26 %  LVESV MOD A4C: 55 76 ml  LVIDd: 3 68 cm  LVIDs: 2 84 cm  LVLd A4C: 8 11 cm  LVLs A4C: 6 99 cm  LVOT Diam: 2 25 cm  LVPWd: 1 98 cm  RA Area: 24 17 cm2  RVIDd: 4 17 cm  SV MOD A4C: 56 34 ml  SV(Teich): 26 78 ml    CW  AV Env  Ti: 296 86 ms  AV VTI: 39 2 cm  AV Vmax: 2 1 m/s  AV Vmean: 1 32 m/s  AV maxP 68 mmHg  AV meanP 71 mmHg  TR Vmax: 2 85 m/s  TR maxP 45 mmHg    MM  TAPSE: 1 69 cm    PW  KISHA (VTI): 2 34 cm2  KISHA Vmax: 1 91 cm2  AVAI (VTI): 0 cm2/m2  AVAI Vmax: 0 cm2/m2  E' Sept: 0 06 m/s  E/E' Sept: 19 55  LVOT Env  Ti: 327 31 ms  LVOT VTI: 22 97 cm  LVOT Vmax: 1 01 m/s  LVOT Vmean: 0 7 m/s  LVOT maxP 04 mmHg  LVOT meanP 24 mmHg  LVSI Dopp: 33 45 ml/m2  LVSV Dopp: 91 66 ml  MV A Juan: 1 43 m/s  MV Dec Wirt: 5 21 m/s2  MV DecT: 213 91 ms  MV E Juan: 1 11 m/s  MV E/A Ratio: 0 78  MV PHT: 62 03 ms  MVA By PHT: 3 55 cm2    [facility] Accredited Echocardiography Laboratory    Prepared and electronically signed by    Kenneth Linares MD  Signed [id number] 10:03:59    No results found for this or any previous visit  No valid procedures specified  No results found for this or any previous visit  Imaging: I have personally reviewed pertinent reports  X-ray chest 1 view portable    Result Date: 2022  Narrative: CHEST INDICATION:   chest pain  COMPARISON:  2020 EXAM PERFORMED/VIEWS:  XR CHEST PORTABLE FINDINGS:  Status post median sternotomy  Atrial appendage occlusive device  Dual-lead cardiac device remains in place  Cardiomegaly  Mild central vascular congestion  No pneumothorax or pleural effusion  Osseous structures appear within normal limits for patient age  Impression: Cardiomegaly with mild central vascular congestion  Workstation performed: [id number]     CTA ED chest PE Study    Result Date: 2022  Narrative: CTA - CHEST WITH IV CONTRAST - PULMONARY ANGIOGRAM INDICATION:   Shortness of breath  Chronic DVT  COMPARISON: 2022   TECHNIQUE: CTA examination of the chest was performed using angiographic technique according to a protocol specifically tailored to evaluate for pulmonary embolism  Axial, sagittal, and coronal 2D reformatted images were created from the source data and  submitted for interpretation  In addition, coronal 3D MIP postprocessing was performed on the acquisition scanner  Radiation dose length product (DLP) for this visit:  1894 mGy-cm   This examination, like all CT scans performed in the [facility], was performed utilizing techniques to minimize radiation dose exposure, including the use of iterative reconstruction and automated exposure control  IV Contrast:  100 mL of iohexol (OMNIPAQUE)  FINDINGS: PULMONARY ARTERIAL TREE:  No filling defect in the visualized pulmonary arteries to suggest acute embolus  LUNGS:  Respiratory motion artifact  Pulmonary vascular congestion and mild interstitial edema at the lung bases and apices  There is no tracheal or endobronchial lesion  PLEURA:  Small right effusion  HEART/GREAT VESSELS:  Unremarkable for patient's age  No thoracic aortic aneurysm  MEDIASTINUM AND HANG:  Cardiomegaly  Postsurgical change from CABG  Left atrial closure device and prosthetic aortic valve  No thoracic aortic aneurysm or dissection  CHEST WALL AND LOWER NECK:   Unremarkable  VISUALIZED STRUCTURES IN THE UPPER ABDOMEN:  Stone filled partially collapsed gallbladder  Partially visualized exophytic right renal 4 cm cyst  Small hiatal hernia OSSEOUS STRUCTURES:  No acute fracture or destructive osseous lesion  Impression: 1  Findings suggestive of mild congestive heart failure  2   No evidence of acute pulmonary embolus, thoracic aortic aneurysm or dissection  Workstation performed: [id number]     VAS lower limb venous duplex study, unilateral/limited    Result Date: 5/8/2022  Narrative:  [facility] REPORT CLINICAL: Indications: Patient presents with increased right lower extremity edema x 4 days   Operative History: 2018-08-17 Right Sclerotherapy 2015-08-20 Endovascular procedure AO valve replacement zahra filter R gsv stripping and ligation Risk Factors The patient has history of Obesity, HTN, Diabetes (NIDDM (Diet)), Hyperlipidemia and DVT  CONCLUSION: Impression: RIGHT LOWER LIMB No evidence of acute or chronic deep vein thrombosis   No evidence of superficial thrombophlebitis noted  Doppler evaluation shows a normal response to augmentation maneuvers  Popliteal, posterior tibial and anterior tibial arterial Doppler waveforms are triphasic  LEFT LOWER LIMB LIMITED Evaluation shows no evidence of thrombus in the common femoral vein  Doppler evaluation shows a normal response to augmentation maneuvers  Technically difficult/limited study to body habitus, pitting edema and poor visualization of calf veins  Technical findings were given to Marcin Gaston via [employer] and posted to [employer]  SIGNATURE: Electronically Signed by: Angel Palomo MD on 2022-05-08 09:24:53 AM      Counseling / Coordination of Care  Total floor / unit time spent today 45 minutes  Greater than 50% of total time was spent with the patient and / or family counseling and / or coordination of care  A description of the counseling / coordination of care: Review of history, current assessment, development of a plan  Code Status: Level 1 - Full Code    ** Please Note: Dragon 360 Dictation voice to text software may have been used in the creation of this document   **

## 2022-05-08 NOTE — H&P
[address] 1935, 81 y o  male MRN: [id number]  Unit/Bed#: ED 05 Encounter: [id number]  Primary Care Provider: Jada Calderon DO   Date and time admitted to hospital: 5/7/2022  3:46 PM    * Acute on chronic diastolic heart failure [facility])  Assessment & Plan  Wt Readings from Last 3 Encounters:   05/07/22 (!) 160 kg (352 lb)   04/18/22 (!) 159 kg (350 lb 12 8 oz)   04/04/22 (!) 150 kg (330 lb)     · POA, history of diastolic heart failure on torsemide 20 mg twice daily  · Patient on advise of his pharmacist discontinued torsemide about 1 month ago after he was started on blood pressure medication with HCTZ  · Presents with 1 day history shortness of breath especially with exertion  · CTA chest revealed "1  Findings suggestive of mild congestive heart failure  2  No evidence of acute pulmonary embolus, thoracic aortic aneurysm or dissection"  · Last echocardiogram was in November 2020 which showed EF 55%, mild to moderately dilated left and right atrium, bioprosthetic AV  · He will be started on IV Lasix 40 mg twice daily, daily weights, I's and O's, 2 g salt diet  · Obtain echocardiogram, cardiology consult    Ventricular tachycardia [facility])  Assessment & Plan  · History of V-tach  Status post ICD placement  · Patient denies ICD firing  · Follows outpatient with Dr Roberto Hyman    Type 2 diabetes mellitus with renal complication [facility])  Assessment & Plan  Lab Results   Component Value Date    HGBA1C 6 3 (H) 03/01/2022     No results for input(s): POCGLU in the last 72 hours  Blood Sugar Average: Last 72 hrs:  ·  Most recent hemoglobin A1c results noted  · On metformin 1000 mg q p m   At home  · Hold oral medications, start on Accu-Cheks a c  HS with SSI coverage    DVT (deep venous thrombosis) (HCC)  Assessment & Plan  · Extensive history of DVT with chronic venous insufficiency in both legs  · Uses compression pumps and stockings regularly and is on [facility] with Warfarin  · INR today is pending  Will follow-up on results and place order for Coumadin accordingly    PAF (paroxysmal atrial fibrillation) ([facility])  Assessment & Plan  · Heart rate on telemetry ranging from 40s to 80s during my evaluation  · He is status post pacemaker placement and on sotalol  · Continue medications  Anticoagulation with Coumadin  INR pending    Essential hypertension  Assessment & Plan  · Blood pressure acceptable in low 983H LCOKLRPM/63Z diastolic  · Continue Atacand/HCTZ    S/P AVR  Assessment & Plan  · Status post bioprosthetic aortic valve replacement  · Follow-up on echocardiogram    Stage 3a chronic kidney disease with elevated creatinine  Assessment & Plan  Lab Results   Component Value Date    EGFR 41 05/07/2022    EGFR 53 11/05/2020    EGFR 54 06/29/2020    CREATININE 1 51 (H) 05/07/2022    CREATININE 1 25 11/05/2020    CREATININE 1 22 06/29/2020     · Baseline creatinine appears to be around 1 2  · Patient presents with creatinine elevated at 1 51 likely in the setting of acute CHF  · Continue to monitor closely with diuresis  · Hold ARB pending return to baseline function  · Avoid NSAID's      VTE Prophylaxis: Warfarin (Coumadin)  / sequential compression device     Code Status:  Full code    Patient and spouse updated at the bedside, all questions answered    Anticipated Length of Stay:  Patient will be admitted on an Inpatient basis with an anticipated length of stay of  > 2 midnights  Justification for Hospital Stay:  Acute CHF    Chief Complaint:     Shortness of breath    History of Present Illness:  Joan Pinedo is a 81 y o  male who presents with 1 day history dyspnea on exertion  The patient reports symptoms started yesterday when he was walking to his chart poor  Symptoms worse with exertion and continued into today prompting his presentation to the ED  He denies chest pain, diaphoresis or palpitations    He reports approximately 1 month ago he was informed by his pharmacist to discontinue torsemide since he was started on a blood pressure medication that included a diuretic  For about a month he has not been taking his torsemide which was typically dose at 20 mg twice daily  He denies any fever or chills, no cough  He reports chronic lower extremity swelling which has been worse in the last several days  He uses compression pumps and stockings on a regular basis  He is status post right knee surgery and reports right knee pain is typically worse  He has been on chronic anticoagulation with warfarin since the 60s secondary to extensive history of DVT  Workup in the ED which included CT of the chest showed findings suggestive of mild congestive heart failure  He reports chronic dysphagia symptoms which is worse recently but no odynophagia  Review of Systems   Constitutional: Positive for fatigue and unexpected weight change  Negative for diaphoresis  HENT: Negative  Eyes: Negative  Respiratory: Positive for shortness of breath  Negative for cough  Gastrointestinal: Negative  Genitourinary: Negative  Musculoskeletal: Negative  Neurological: Negative  Psychiatric/Behavioral: Negative  All other systems reviewed and are negative        Past Medical History:   Diagnosis Date    Anemia     Arthritis     Atrial fibrillation ([facility]     Basal cell carcinoma 03/22/2022    Tip of Nose    CHF (congestive heart failure) (HCC)     Diabetes mellitus ([facility] )     Niddm    DVT (deep vein thrombosis) in pregnancy 1966    not in pregnancy    DVT (deep venous thrombosis) ([facility] ) 1966    Dyslipidemia     GERD (gastroesophageal reflux disease)     Hyperlipidemia     Hypertension     Irregular heart beat     Afib    Morbid obesity due to excess calories ([address] )     Resolved 9/2/2014     Pulmonary embolism ([facility]     Sepsis ([facility]     Squamous cell skin cancer 07/30/2020    Left posterior scalp    Visual impairment        Past Surgical History:   Procedure Laterality Date    AORTIC VALVE REPLACEMENT      CARDIAC DEFIBRILLATOR PLACEMENT  04/2014   [facility] CARDIAC SURGERY  02/2014    AVR    COLONOSCOPY      INSERT / REPLACE / REMOVE PACEMAKER      JOINT REPLACEMENT Left     LTKR    MOHS SURGERY  07/30/2020    Left posterior scalp, Dr Bela Gasca  04/18/2022    [address]  [place] Tip of Nose- Dr José Miguel Mitchell SEP-FEM JUNC Right 8/17/2018    Procedure: LEG PERFORATED INJECTION SCLEROTHERAPY;  Surgeon: Jann Masterson MD;  Location: [facility];  Service: Vascular    REPLACEMENT TOTAL KNEE Right     TOTAL KNEE ARTHROPLASTY Left     VASCULAR SURGERY      VENA CAVA FILTER PLACEMENT      Interruption inferior vena cava, [place] filter, placement    WISDOM TOOTH EXTRACTION         Family History:  Family History   Problem Relation Age of Onset    Arthritis Mother     Stroke Mother     Arthritis Father     Cancer Father     Arthritis Daughter        Home Meds:  all medications and allergies reviewed    Allergies: Allergies   Allergen Reactions    Tramadol Other (See Comments)     intolerance       Marital Status: /Civil Union     Social History     Substance and Sexual Activity   Alcohol Use Not Currently     Social History     Tobacco Use   Smoking Status Never Smoker   Smokeless Tobacco Never Used     Social History     Substance and Sexual Activity   Drug Use No     Physical Exam:   Vitals:   Blood Pressure: 154/69 (05/07/22 1927)  Pulse: 77 (05/07/22 1927)  Temperature: 98 9 °F (37 2 °C) (05/07/22 1600)  Temp Source: Oral (05/07/22 1600)  Respirations: 20 (05/07/22 1927)  Height: 6' 1" (185 4 cm) (05/07/22 1600)  Weight - Scale: (!) 160 kg (352 lb) (05/07/22 1600)  SpO2: 98 % (05/07/22 1927)    Physical Exam  Vitals reviewed  Constitutional:       General: He is not in acute distress  Appearance: He is obese  He is not ill-appearing, toxic-appearing or diaphoretic     HENT:      Head: Normocephalic and atraumatic  Mouth/Throat:      Mouth: Mucous membranes are moist    Eyes:      General: No scleral icterus  Right eye: No discharge  Left eye: No discharge  Cardiovascular:      Rate and Rhythm: Normal rate and regular rhythm  Heart sounds: Murmur heard  Pulmonary:      Effort: Pulmonary effort is normal       Breath sounds: Examination of the right-lower field reveals rales  Examination of the left-lower field reveals rales  Rales present  No wheezing or rhonchi  Abdominal:      General: There is no distension  Palpations: Abdomen is soft  There is no mass  Tenderness: There is no abdominal tenderness  Musculoskeletal:      Cervical back: Neck supple  Right lower leg: Edema present  Left lower leg: Edema present  Skin:     General: Skin is warm  Neurological:      General: No focal deficit present  Mental Status: He is alert and oriented to person, place, and time  Mental status is at baseline  Psychiatric:         Mood and Affect: Mood normal          Behavior: Behavior normal          Lab Results: I have personally reviewed pertinent reports  Results from last 7 days   Lab Units 05/07/22  1550   WBC Thousand/uL 6 10   HEMOGLOBIN g/dL 10 1*   HEMATOCRIT % 32 4*   PLATELETS Thousands/uL 221   NEUTROS PCT % 70   LYMPHS PCT % 15   MONOS PCT % 12   EOS PCT % 2     Results from last 7 days   Lab Units 05/07/22  1550   POTASSIUM mmol/L 4 4   CHLORIDE mmol/L 106   CO2 mmol/L 25   BUN mg/dL 33*   CREATININE mg/dL 1 51*   CALCIUM mg/dL 8 9   ALK PHOS U/L 61   ALT U/L 13   AST U/L 15           Imaging: I have personally reviewed pertinent reports  CTA ED chest PE Study    Result Date: 5/7/2022  Narrative: CTA - CHEST WITH IV CONTRAST - PULMONARY ANGIOGRAM INDICATION:   Shortness of breath  Chronic DVT  COMPARISON: 5/7/2022   TECHNIQUE: CTA examination of the chest was performed using angiographic technique according to a protocol specifically tailored to evaluate for pulmonary embolism  Axial, sagittal, and coronal 2D reformatted images were created from the source data and  submitted for interpretation  In addition, coronal 3D MIP postprocessing was performed on the acquisition scanner  Radiation dose length product (DLP) for this visit:  1894 mGy-cm   This examination, like all CT scans performed in the [facility], was performed utilizing techniques to minimize radiation dose exposure, including the use of iterative reconstruction and automated exposure control  IV Contrast:  100 mL of iohexol (OMNIPAQUE)  FINDINGS: PULMONARY ARTERIAL TREE:  No filling defect in the visualized pulmonary arteries to suggest acute embolus  LUNGS:  Respiratory motion artifact  Pulmonary vascular congestion and mild interstitial edema at the lung bases and apices  There is no tracheal or endobronchial lesion  PLEURA:  Small right effusion  HEART/GREAT VESSELS:  Unremarkable for patient's age  No thoracic aortic aneurysm  MEDIASTINUM AND HANG:  Cardiomegaly  Postsurgical change from CABG  Left atrial closure device and prosthetic aortic valve  No thoracic aortic aneurysm or dissection  CHEST WALL AND LOWER NECK:   Unremarkable  VISUALIZED STRUCTURES IN THE UPPER ABDOMEN:  Stone filled partially collapsed gallbladder  Partially visualized exophytic right renal 4 cm cyst  Small hiatal hernia OSSEOUS STRUCTURES:  No acute fracture or destructive osseous lesion  Impression: 1  Findings suggestive of mild congestive heart failure  2   No evidence of acute pulmonary embolus, thoracic aortic aneurysm or dissection  Workstation performed: [id number]     VAS lower limb venous duplex study, unilateral/limited    Result Date: 5/7/2022  Narrative:  [facility] REPORT CLINICAL: Indications: Patient presents with increased right lower extremity edema x 4 days   Operative History: 2018-08-17 Right Sclerotherapy 2015-08-20 Endovascular procedure AO valve replacement zahra filter R gsv stripping and ligation Risk Factors The patient has history of Obesity, HTN, Diabetes (NIDDM (Diet)), Hyperlipidemia and DVT  CONCLUSION: RIGHT LOWER LIMB No evidence of acute or chronic deep vein thrombosis   No evidence of superficial thrombophlebitis noted  Doppler evaluation shows a normal response to augmentation maneuvers  Popliteal, posterior tibial and anterior tibial arterial Doppler waveforms are triphasic  LEFT LOWER LIMB LIMITED Evaluation shows no evidence of thrombus in the common femoral vein  Doppler evaluation shows a normal response to augmentation maneuvers  Technically difficult/limited study to body habitus, pitting edema and poor visualization of calf veins  Technical findings were given to Roslyn Martinez via [employer] and posted to [facility]  EKG, Pathology, and Other Studies Reviewed on Admission:   · Sinus rhythm with first-degree AV block, LBBB    ** Please Note: Dragon 360 Dictation voice to text software may have been used in the creation of this document   **

## 2022-05-08 NOTE — ASSESSMENT & PLAN NOTE
· History of V-tach    Status post ICD placement  · Patient denies ICD firing  · Follows outpatient with Dr Angy Franco  · Follow up tele

## 2022-05-08 NOTE — PROGRESS NOTES
[facility]  Progress Note - [facility] 1935, 81 y o  male MRN: [id number]  Unit/Bed#: S -01 Encounter: [id number]  Primary Care Provider: William Roberson DO   Date and time admitted to hospital: 5/7/2022  3:46 PM    * Acute on chronic diastolic heart failure [facility])  Assessment & Plan  Wt Readings from Last 3 Encounters:   05/08/22 (!) 149 kg (329 lb)   04/18/22 (!) 159 kg (350 lb 12 8 oz)   04/04/22 (!) 150 kg (330 lb)     · POA, history of diastolic heart failure on torsemide 20 mg twice daily  · Patient on advise of his pharmacist discontinued torsemide about 1 month ago after he was started on blood pressure medication with HCTZ  · Presents with 1 day history shortness of breath especially with exertion  · CTA chest revealed "1  Findings suggestive of mild congestive heart failure  2  No evidence of acute pulmonary embolus, thoracic aortic aneurysm or dissection"  · Last echocardiogram was in November 2020 which showed EF 55%, mild to moderately dilated left and right atrium, bioprosthetic AV  · Cardiology consultation appreciated   · Increase Lasix to 80 mg IV BID  · Continue I/O, daily weights  · Cardiac   · Follow up ECHO  · Likely transition to PO Torsemide tomorrow     PAF (paroxysmal atrial fibrillation) (HCC)  Assessment & Plan  · Heart rate on telemetry ranging from 40s to 80s during my evaluation  · He is status post pacemaker placement and on sotalol  · Continue medications  · Anticoagulation with Coumadin  · INR therapeutic     Ventricular tachycardia (HCC)  Assessment & Plan  · History of V-tach    Status post ICD placement  · Patient denies ICD firing  · Follows outpatient with Dr Elly Peralta  · Follow up tele     S/P AVR  Assessment & Plan  · Status post bioprosthetic aortic valve replacement  · Follow-up on echocardiogram    Stage 3a chronic kidney disease with elevated creatinine  Assessment & Plan  Lab Results   Component Value Date    EGFR 46 05/08/2022 EGFR 41 05/07/2022    EGFR 53 11/05/2020    CREATININE 1 37 (H) 05/08/2022    CREATININE 1 51 (H) 05/07/2022    CREATININE 1 25 11/05/2020     · Baseline creatinine appears to be around 1 2  · Patient presents with creatinine elevated at 1 51 likely in the setting of acute CHF  · Improved to 1 3 with diuresis   · Continue to monitor closely with diuresis  · Hold ARB pending return to baseline function  · Avoid NSAID's    DVT (deep venous thrombosis) ([address] )  Assessment & Plan  · Extensive history of DVT with chronic venous insufficiency in both legs  · Uses compression pumps and stockings regularly and is on [facility] with Warfarin  · INR therapeutic  · Continue Coumadin     Type 2 diabetes mellitus with renal complication [facility])  Assessment & Plan  Lab Results   Component Value Date    HGBA1C 6 3 (H) 03/01/2022     Recent Labs     05/07/22  2121 05/08/22  0749 05/08/22  1118   POCGLU 158* 142* 143*       Blood Sugar Average: Last 72 hrs:  · (P) 094 [id number]   · Most recent hemoglobin A1c results noted  · On metformin 1000 mg QPM at home  · Hold oral medications  · Start on Accu-Cheks AC/HS with SSI coverage    Essential hypertension  Assessment & Plan  · Blood pressure acceptable  · Holding ARB in setting of elevated creatinine   · Stop HCTZ  · Likely hold indefinitely when placed back on loop diuretic         VTE Pharmacologic Prophylaxis: VTE Score: 4 Moderate Risk (Score 3-4) - Pharmacological DVT Prophylaxis Ordered: warfarin (Coumadin)  Patient Centered Rounds: I performed bedside rounds with nursing staff today  Discussions with Specialists or Other Care Team Provider: Discussed with Cardiology, RN, CM    Education and Discussions with Family / Patient: Updated  (wife) via phone  Time Spent for Care: 30 minutes  More than 50% of total time spent on counseling and coordination of care as described above      Current Length of Stay: 1 day(s)  Current Patient Status: Inpatient   Certification Statement: The patient will continue to require additional inpatient hospital stay due to further IV diuresis   Discharge Plan: Anticipate discharge tomorrow to home  Code Status: Level 1 - Full Code    Subjective:   Patient reports feeling much better today  Reports less shortness of breath  Reports urinating frequently  Denies chest pain  Objective:     Vitals:   Temp (24hrs), Av 4 °F (36 9 °C), Min:97 9 °F (36 6 °C), Max:98 9 °F (37 2 °C)    Temp:  [97 9 °F (36 6 °C)-98 9 °F (37 2 °C)] 98 3 °F (36 8 °C)  HR:  [67-85] 85  Resp:  [18-22] 18  BP: (114-176)/(58-80) 117/77  SpO2:  [95 %-100 %] 96 %  Body mass index is 43 41 kg/m²  Input and Output Summary (last 24 hours): Intake/Output Summary (Last 24 hours) at 2022 1130  Last data filed at 2022 0601  Gross per 24 hour   Intake 480 ml   Output 1750 ml   Net -1270 ml       Physical Exam:   Physical Exam  Constitutional:       General: He is not in acute distress  Appearance: Normal appearance  He is overweight  He is not ill-appearing or diaphoretic  HENT:      Head: Normocephalic and atraumatic  Mouth/Throat:      Mouth: Mucous membranes are moist    Eyes:      General: No scleral icterus  Pupils: Pupils are equal, round, and reactive to light  Cardiovascular:      Rate and Rhythm: Normal rate and regular rhythm  Pulses: Normal pulses  Heart sounds: Normal heart sounds, S1 normal and S2 normal  No murmur heard  No systolic murmur is present  No diastolic murmur is present  No gallop  No S3 or S4 sounds  Pulmonary:      Effort: Pulmonary effort is normal  No accessory muscle usage or respiratory distress  Breath sounds: Normal breath sounds  No stridor  No decreased breath sounds, wheezing, rhonchi or rales  Chest:      Chest wall: No tenderness  Abdominal:      General: Bowel sounds are normal  There is no distension  Palpations: Abdomen is soft  Tenderness:  There is no abdominal tenderness  There is no guarding  Musculoskeletal:      Right lower leg: No edema  Left lower leg: No edema  Skin:     General: Skin is warm and dry  Coloration: Skin is not jaundiced  Neurological:      General: No focal deficit present  Mental Status: He is alert  Mental status is at baseline  Motor: No tremor or seizure activity  Psychiatric:         Behavior: Behavior is cooperative  Additional Data:     Labs:  Results from last 7 days   Lab Units 05/07/22  1550   WBC Thousand/uL 6 10   HEMOGLOBIN g/dL 10 1*   HEMATOCRIT % 32 4*   PLATELETS Thousands/uL 221   NEUTROS PCT % 70   LYMPHS PCT % 15   MONOS PCT % 12   EOS PCT % 2     Results from last 7 days   Lab Units 05/08/22  0452 05/07/22  1550 05/07/22  1550   SODIUM mmol/L 138   < > 140   POTASSIUM mmol/L 4 0   < > 4 4   CHLORIDE mmol/L 103   < > 106   CO2 mmol/L 23   < > 25   BUN mg/dL 31*   < > 33*   CREATININE mg/dL 1 37*   < > 1 51*   ANION GAP mmol/L 12   < > 9   CALCIUM mg/dL 9 1   < > 8 9   ALBUMIN g/dL  --   --  4 1   TOTAL BILIRUBIN mg/dL  --   --  3 22*   ALK PHOS U/L  --   --  61   ALT U/L  --   --  13   AST U/L  --   --  15   GLUCOSE RANDOM mg/dL 132   < > 116    < > = values in this interval not displayed       Results from last 7 days   Lab Units 05/07/22  1954   INR  2 44*     Results from last 7 days   Lab Units 05/08/22  1118 05/08/22  0749 05/07/22  2121   POC GLUCOSE mg/dl 143* 142* 158*         Results from last 7 days   Lab Units 05/07/22  1550   LACTIC ACID mmol/L 1 4   PROCALCITONIN ng/ml <0 05       Lines/Drains:  Invasive Devices  Report    Peripheral Intravenous Line            Peripheral IV 05/07/22 Right Antecubital <1 day                  Telemetry:  Telemetry Orders (From admission, onward)             48 Hour Telemetry Monitoring  Continuous x 48 hours        References:    Telemetry Guidelines   Question:  Reason for 48 Hour Telemetry  Answer:  Acute Decompensated CHF (continuous diuretic infusion or total diuretic dose > 200 mg daily, associated electrolyte derangement, ionotropic drip, history of ventricular arrhythmia, or new EF <35%)                 Telemetry Reviewed: Normal Sinus Rhythm  Indication for Continued Telemetry Use: Acute CHF on >200 mg lasix/day or equivalent dose or with new reduced EF  Imaging: No pertinent imaging reviewed  Recent Cultures (last 7 days):   Results from last 7 days   Lab Units 05/07/22  1550   BLOOD CULTURE  Received in Microbiology Lab  Culture in Progress  Last 24 Hours Medication List:   Current Facility-Administered Medications   Medication Dose Route Frequency Provider Last Rate    acetaminophen  650 mg Oral Q6H PRN Gabrielle Ying MD      atorvastatin  40 mg Oral After Donnie Garcia MD      cholecalciferol  2,000 Units Oral Daily Rin Duff MD      fluticasone  2 spray Each Nare Daily Rin Duff MD      furosemide  40 mg Intravenous Once Automatic Data, ALLISON      furosemide  80 mg Intravenous BID Automatic Data, ALLISON      insulin lispro  1-5 Units Subcutaneous HS Rin Duff MD      insulin lispro  2-12 Units Subcutaneous TID AC Rin Duff MD      multivitamin stress formula  1 tablet Oral Daily Rin Duff MD      pantoprazole  40 mg Oral Early Morning Rin Duff MD      potassium chloride  20 mEq Oral After Donnie Garcia MD      sodium chloride (PF)  3 mL Intravenous Q1H PRN Gabrielle Ying MD      sotalol  80 mg Oral Q12H Rin Duff MD      warfarin  4 mg Oral Once per day on Sun Mon Wed Thu Sat ODESSA Nails      [START ON 5/10/2022] warfarin  6 mg Oral Once per day on Tue Fri ODESSA Nails          Today, Patient Was Seen By: Meeta Méndez PA-C    **Please Note: This note may have been constructed using a voice recognition system  **

## 2022-05-08 NOTE — ASSESSMENT & PLAN NOTE
Lab Results   Component Value Date    EGFR 46 05/08/2022    EGFR 41 05/07/2022    EGFR 53 11/05/2020    CREATININE 1 37 (H) 05/08/2022    CREATININE 1 51 (H) 05/07/2022    CREATININE 1 25 11/05/2020     · Baseline creatinine appears to be around 1 2  · Patient presents with creatinine elevated at 1 51 likely in the setting of acute CHF  · Improved to 1 3 with diuresis   · Continue to monitor closely with diuresis  · Hold ARB pending return to baseline function  · Avoid NSAID's

## 2022-05-08 NOTE — PLAN OF CARE
Problem: Potential for Falls  Goal: Patient will remain free of falls  Description: INTERVENTIONS:  - Educate patient/family on patient safety including physical limitations  - Instruct patient to call for assistance with activity   - Consult OT/PT to assist with strengthening/mobility   - Keep Call bell within reach  - Keep bed low and locked with side rails adjusted as appropriate  - Keep care items and personal belongings within reach  - Initiate and maintain comfort rounds  - Make Fall Risk Sign visible to staff  - Offer Toileting every  Hours, in advance of need  - Initiate/Maintain alarm  - Obtain necessary fall risk management equipment:   - Apply yellow socks and bracelet for high fall risk patients  - Consider moving patient to room near nurses station  Outcome: Progressing     Problem: PAIN - ADULT  Goal: Verbalizes/displays adequate comfort level or baseline comfort level  Description: Interventions:  - Encourage patient to monitor pain and request assistance  - Assess pain using appropriate pain scale  - Administer analgesics based on type and severity of pain and evaluate response  - Implement non-pharmacological measures as appropriate and evaluate response  - Consider cultural and social influences on pain and pain management  - Notify physician/advanced practitioner if interventions unsuccessful or patient reports new pain  Outcome: Progressing     Problem: INFECTION - ADULT  Goal: Absence or prevention of progression during hospitalization  Description: INTERVENTIONS:  - Assess and monitor for signs and symptoms of infection  - Monitor lab/diagnostic results  - Monitor all insertion sites, i e  indwelling lines, tubes, and drains  - Monitor endotracheal if appropriate and nasal secretions for changes in amount and color  - [place] appropriate cooling/warming therapies per order  - Administer medications as ordered  - Instruct and encourage patient and family to use good hand hygiene technique  - Identify and instruct in appropriate isolation precautions for identified infection/condition  Outcome: Progressing  Goal: Absence of fever/infection during neutropenic period  Description: INTERVENTIONS:  - Monitor WBC    Outcome: Progressing     Problem: SAFETY ADULT  Goal: Patient will remain free of falls  Description: INTERVENTIONS:  - Educate patient/family on patient safety including physical limitations  - Instruct patient to call for assistance with activity   - Consult OT/PT to assist with strengthening/mobility   - Keep Call bell within reach  - Keep bed low and locked with side rails adjusted as appropriate  - Keep care items and personal belongings within reach  - Initiate and maintain comfort rounds  - Make Fall Risk Sign visible to staff  - Offer Toileting every  Hours, in advance of need  - Initiate/Maintain alarm  - Obtain necessary fall risk management equipment:   - Apply yellow socks and bracelet for high fall risk patients  - Consider moving patient to room near nurses station  Outcome: Progressing  Goal: Maintain or return to baseline ADL function  Description: INTERVENTIONS:  -  Assess patient's ability to carry out ADLs; assess patient's baseline for ADL function and identify physical deficits which impact ability to perform ADLs (bathing, care of mouth/teeth, toileting, grooming, dressing, etc )  - Assess/evaluate cause of self-care deficits   - Assess range of motion  - Assess patient's mobility; develop plan if impaired  - Assess patient's need for assistive devices and provide as appropriate  - Encourage maximum independence but intervene and supervise when necessary  - Involve family in performance of ADLs  - Assess for home care needs following discharge   - Consider OT consult to assist with ADL evaluation and planning for discharge  - Provide patient education as appropriate  Outcome: Progressing  Goal: Maintains/Returns to pre admission functional level  Description: INTERVENTIONS:  - Perform BMAT or MOVE assessment daily    - Set and communicate daily mobility goal to care team and patient/family/caregiver  - Collaborate with rehabilitation services on mobility goals if consulted  - Perform Range of Motion  times a day  - Reposition patient every  hours  - Dangle patient  times a day  - Stand patient  times a day  - Ambulate patient  times a day  - Out of bed to chair  times a day   - Out of bed for meals  times a day  - Out of bed for toileting  - Record patient progress and toleration of activity level   Outcome: Progressing     Problem: DISCHARGE PLANNING  Goal: Discharge to home or other facility with appropriate resources  Description: INTERVENTIONS:  - Identify barriers to discharge w/patient and caregiver  - Arrange for needed discharge resources and transportation as appropriate  - Identify discharge learning needs (meds, wound care, etc )  - Arrange for interpretive services to assist at discharge as needed  - Refer to Case Management Department for coordinating discharge planning if the patient needs post-hospital services based on physician/advanced practitioner order or complex needs related to functional status, cognitive ability, or social support system  Outcome: Progressing     Problem: Knowledge Deficit  Goal: Patient/family/caregiver demonstrates understanding of disease process, treatment plan, medications, and discharge instructions  Description: Complete learning assessment and assess knowledge base    Interventions:  - Provide teaching at level of understanding  - Provide teaching via preferred learning methods  Outcome: Progressing

## 2022-05-08 NOTE — ASSESSMENT & PLAN NOTE
Lab Results   Component Value Date    HGBA1C 6 3 (H) 03/01/2022     Recent Labs     05/07/22  2121 05/08/22  0749 05/08/22  1118   POCGLU 158* 142* 143*       Blood Sugar Average: Last 72 hrs:  · (P) 009 [id number]   · Most recent hemoglobin A1c results noted  · On metformin 1000 mg QPM at home  · Hold oral medications  · Start on Accu-Cheks AC/HS with SSI coverage

## 2022-05-08 NOTE — ASSESSMENT & PLAN NOTE
· Heart rate on telemetry ranging from 40s to 80s during my evaluation  · He is status post pacemaker placement and on sotalol  · Continue medications  · Anticoagulation with Coumadin  · INR therapeutic

## 2022-05-08 NOTE — ASSESSMENT & PLAN NOTE
· Blood pressure acceptable  · Holding ARB in setting of elevated creatinine   · Stop HCTZ  · Likely hold indefinitely when placed back on loop diuretic

## 2022-05-09 ENCOUNTER — EPISODE CHANGES (OUTPATIENT)
Dept: CASE MANAGEMENT | Facility: OTHER | Age: 87
End: 2022-05-09

## 2022-05-09 VITALS
BODY MASS INDEX: 41.75 KG/M2 | TEMPERATURE: 98.2 F | DIASTOLIC BLOOD PRESSURE: 59 MMHG | SYSTOLIC BLOOD PRESSURE: 122 MMHG | HEART RATE: 64 BPM | RESPIRATION RATE: 18 BRPM | WEIGHT: 315 LBS | HEIGHT: 73 IN | OXYGEN SATURATION: 96 %

## 2022-05-09 LAB
ANION GAP SERPL CALCULATED.3IONS-SCNC: 10 MMOL/L (ref 4–13)
ATRIAL RATE: 85 BPM
BUN SERPL-MCNC: 40 MG/DL (ref 5–25)
CALCIUM SERPL-MCNC: 8.7 MG/DL (ref 8.4–10.2)
CHLORIDE SERPL-SCNC: 104 MMOL/L (ref 96–108)
CO2 SERPL-SCNC: 26 MMOL/L (ref 21–32)
CREAT SERPL-MCNC: 1.57 MG/DL (ref 0.6–1.3)
GFR SERPL CREATININE-BSD FRML MDRD: 39 ML/MIN/1.73SQ M
GLUCOSE SERPL-MCNC: 130 MG/DL (ref 65–140)
GLUCOSE SERPL-MCNC: 130 MG/DL (ref 65–140)
GLUCOSE SERPL-MCNC: 136 MG/DL (ref 65–140)
POTASSIUM SERPL-SCNC: 3.8 MMOL/L (ref 3.5–5.3)
PR INTERVAL: 248 MS
QRS AXIS: 85 DEGREES
QRSD INTERVAL: 164 MS
QT INTERVAL: 454 MS
QTC INTERVAL: 540 MS
SODIUM SERPL-SCNC: 140 MMOL/L (ref 135–147)
T WAVE AXIS: 47 DEGREES
VENTRICULAR RATE: 85 BPM

## 2022-05-09 PROCEDURE — 93010 ELECTROCARDIOGRAM REPORT: CPT | Performed by: INTERNAL MEDICINE

## 2022-05-09 PROCEDURE — 99239 HOSP IP/OBS DSCHRG MGMT >30: CPT | Performed by: PHYSICIAN ASSISTANT

## 2022-05-09 PROCEDURE — 99232 SBSQ HOSP IP/OBS MODERATE 35: CPT | Performed by: INTERNAL MEDICINE

## 2022-05-09 PROCEDURE — 80048 BASIC METABOLIC PNL TOTAL CA: CPT | Performed by: PHYSICIAN ASSISTANT

## 2022-05-09 PROCEDURE — 82948 REAGENT STRIP/BLOOD GLUCOSE: CPT

## 2022-05-09 RX ORDER — TORSEMIDE 20 MG/1
40 TABLET ORAL DAILY
Status: DISCONTINUED | OUTPATIENT
Start: 2022-05-09 | End: 2022-05-09

## 2022-05-09 RX ORDER — TORSEMIDE 20 MG/1
20 TABLET ORAL 2 TIMES DAILY
Qty: 180 TABLET | Refills: 0 | Status: SHIPPED | OUTPATIENT
Start: 2022-05-09 | End: 2022-06-20

## 2022-05-09 RX ORDER — TORSEMIDE 20 MG/1
20 TABLET ORAL
Status: DISCONTINUED | OUTPATIENT
Start: 2022-05-10 | End: 2022-05-09 | Stop reason: HOSPADM

## 2022-05-09 RX ADMIN — PANTOPRAZOLE SODIUM 40 MG: 40 TABLET, DELAYED RELEASE ORAL at 05:09

## 2022-05-09 RX ADMIN — B-COMPLEX W/ C & FOLIC ACID TAB 1 TABLET: TAB at 09:23

## 2022-05-09 RX ADMIN — FLUTICASONE PROPIONATE 2 SPRAY: 50 SPRAY, METERED NASAL at 09:24

## 2022-05-09 RX ADMIN — Medication 2000 UNITS: at 09:23

## 2022-05-09 RX ADMIN — SOTALOL HYDROCHLORIDE 80 MG: 80 TABLET ORAL at 09:23

## 2022-05-09 RX ADMIN — FUROSEMIDE 80 MG: 10 INJECTION, SOLUTION INTRAMUSCULAR; INTRAVENOUS at 09:23

## 2022-05-09 NOTE — DISCHARGE SUMMARY
[facility] 1935, 81 y o  male MRN: [id number]  Unit/Bed#: S -01 Encounter: [id number]  Primary Care Provider: Delbert Castro DO   Date and time admitted to hospital: 5/7/2022  3:46 PM    * Acute on chronic diastolic heart failure [facility])  Assessment & Plan  Wt Readings from Last 3 Encounters:   05/09/22 (!) 149 kg (328 lb 11 3 oz)   04/18/22 (!) 159 kg (350 lb 12 8 oz)   04/04/22 (!) 150 kg (330 lb)     · POA, history of diastolic heart failure on torsemide 20 mg twice daily  · Patient on advise of his pharmacist discontinued torsemide about 1 month ago after he was started on blood pressure medication with HCTZ  · Presents with 1 day history shortness of breath especially with exertion  · CTA chest revealed "1  Findings suggestive of mild congestive heart failure  2  No evidence of acute pulmonary embolus, thoracic aortic aneurysm or dissection"  · Last echocardiogram was in November 2020 which showed EF 55%, mild to moderately dilated left and right atrium, bioprosthetic AV  · Stable for discharge   · Cardiology consultation appreciated   · Restart Torsemide 20 mg BID   · Continue I/O, daily weights      PAF (paroxysmal atrial fibrillation) (HCC)  Assessment & Plan  · Heart rate on telemetry ranging from 40s to 80s during my evaluation  · He is status post pacemaker placement and on sotalol  · Continue medications  · Anticoagulation with Coumadin  · INR therapeutic     Ventricular tachycardia (HCC)  Assessment & Plan  · History of V-tach    Status post ICD placement  · Patient denies ICD firing  · Follows outpatient with Dr Estefany Khanna  · Continue Sotalol     S/P AVR  Assessment & Plan  · Status post bioprosthetic aortic valve replacement  · ECHO stable     Stage 3a chronic kidney disease with elevated creatinine  Assessment & Plan  Lab Results   Component Value Date    EGFR 39 05/09/2022    EGFR 46 05/08/2022    EGFR 41 05/07/2022    CREATININE 1 57 (H) 05/09/2022    CREATININE 1 37 (H) 05/08/2022    CREATININE 1 51 (H) 05/07/2022     · Baseline creatinine appears to be around 1 2  · Patient presents with creatinine elevated at 1 51 likely in the setting of acute CHF  · Improved to 1 3 with diuresis but then increased to 1 57 again   · Hold ARB pending return to baseline function  · Avoid NSAID's  · Check BMP on Wednesday    DVT (deep venous thrombosis) ([facility])  Assessment & Plan  · Extensive history of DVT with chronic venous insufficiency in both legs  · Uses compression pumps and stockings regularly and is on [facility] with Warfarin  · INR therapeutic  · Continue Coumadin     Type 2 diabetes mellitus with renal complication [facility])  Assessment & Plan  Lab Results   Component Value Date    HGBA1C 6 3 (H) 03/01/2022     Recent Labs     05/08/22  1619 05/08/22  2103 05/09/22  0722 05/09/22  1103   POCGLU 132 156* 136 130       Blood Sugar Average: Last 72 hrs:  · (P) 142 [id number]   · Most recent hemoglobin A1c results noted  · Restart home medications       Essential hypertension  Assessment & Plan  · Blood pressure acceptable  · Holding ARB in setting of elevated creatinine   · Stop HCTZ  · Restart ARB as indicated in renal function stabilizes       Medical Problems             Resolved Problems  Date Reviewed: 5/9/2022    None              Discharging Physician / Practitioner: Candace Bishop PA-C  PCP: Sherryll Essex, DO  Admission Date:   Admission Orders (From admission, onward)     Ordered        05/07/22 1904  Inpatient Admission  Once                      Discharge Date: 05/09/22    Consultations During Hospital Stay:  · Cardiology - Dr Liban Brandt, Dr Hal Wong     Procedures Performed:   · CXR  · CTA PE Study  · LE Duplex     · ECHO    Significant Findings / Test Results:   · CXR: Cardiomegaly with mild central vascular congestion   · CTA PE study: Findings suggestive of mild congestive heart failure   No evidence of acute pulmonary embolus, thoracic aortic aneurysm or dissection   · LE Duplex: Negative for DVT bilaterally   · ECHO: EF = 60%  Normal systolic function and wall motion    Incidental Findings:   · None     Test Results Pending at Discharge (will require follow up): · None     Outpatient Tests Requested:  · BMP    Complications:  None    Reason for Admission: Acute on Chronic Congestive Heart Failure     Hospital Course: Cal Ware is a 81 y o  male patient who originally presented to the hospital on 5/7/2022 due to shortness of breath  Patient was found to have mild congestive heart failure  DVT and PE were ruled out  He was started on IV diuresis and admitted to the hospital  Cardiology was consulted and increased diuretics  ECHO was updated and was stable  His symptoms improved and he was at his dry weight day of discharge  He was changed back to his oral torsemide from prior  His creatinine is above his baseline on day of discharge  His ARB and HCTZ are on hold currently and he will get a BMP on Wednesday  He will follow up with Cardiology in the outpatient setting  Please see above list of diagnoses and related plan for additional information  Condition at Discharge: stable    Discharge Day Visit / Exam:   Subjective:  Patient reports feeling much better day of discharge  He is excited to go home  Vitals: Blood Pressure: 122/59 (05/09/22 0700)  Pulse: 64 (05/09/22 0700)  Temperature: 98 2 °F (36 8 °C) (05/09/22 0700)  Temp Source: Oral (05/09/22 0700)  Respirations: 18 (05/09/22 0700)  Height: 6' 1" (185 4 cm) (05/08/22 0810)  Weight - Scale: (!) 149 kg (328 lb 11 3 oz) (05/09/22 0551)  SpO2: 96 % (05/09/22 0700)  Exam:   Physical Exam  Constitutional:       General: He is not in acute distress  Appearance: Normal appearance  He is obese  He is not ill-appearing or diaphoretic  HENT:      Head: Normocephalic and atraumatic  Mouth/Throat:      Mouth: Mucous membranes are moist    Eyes:      General: No scleral icterus       Pupils: Pupils are equal, round, and reactive to light  Cardiovascular:      Rate and Rhythm: Normal rate and regular rhythm  Pulses: Normal pulses  Heart sounds: S1 normal and S2 normal  Murmur heard  No systolic murmur is present  No diastolic murmur is present  No gallop  No S3 or S4 sounds  Pulmonary:      Effort: Pulmonary effort is normal  No accessory muscle usage or respiratory distress  Breath sounds: Normal breath sounds  No stridor  No decreased breath sounds, wheezing, rhonchi or rales  Chest:      Chest wall: No tenderness  Abdominal:      General: Bowel sounds are normal  There is no distension  Palpations: Abdomen is soft  Tenderness: There is no abdominal tenderness  There is no guarding  Musculoskeletal:      Right lower leg: No edema  Left lower leg: No edema  Skin:     General: Skin is warm and dry  Coloration: Skin is not jaundiced  Neurological:      General: No focal deficit present  Mental Status: He is alert  Mental status is at baseline  Motor: No tremor or seizure activity  Psychiatric:         Behavior: Behavior is cooperative  Discussion with Family: Updated  (wife) at bedside  Discharge instructions/Information to patient and family:   See after visit summary for information provided to patient and family  Provisions for Follow-Up Care:  See after visit summary for information related to follow-up care and any pertinent home health orders  Disposition:   Home    Planned Readmission: None     Discharge Statement:  I spent 45 minutes discharging the patient  This time was spent on the day of discharge  I had direct contact with the patient on the day of discharge  Greater than 50% of the total time was spent examining patient, answering all patient questions, arranging and discussing plan of care with patient as well as directly providing post-discharge instructions    Additional time then spent on discharge activities  Discharge Medications:  See after visit summary for reconciled discharge medications provided to patient and/or family        **Please Note: This note may have been constructed using a voice recognition system**

## 2022-05-09 NOTE — ASSESSMENT & PLAN NOTE
· Extensive history of DVT with chronic venous insufficiency in both legs  · Uses compression pumps and stockings regularly and is on [facility] with Warfarin  · INR therapeutic  · Continue Coumadin

## 2022-05-09 NOTE — ASSESSMENT & PLAN NOTE
· Blood pressure acceptable  · Holding ARB in setting of elevated creatinine   · Stop HCTZ  · Restart ARB as indicated in renal function stabilizes

## 2022-05-09 NOTE — DISCHARGE INSTR - AVS FIRST PAGE
Dear Zander Shaffer,     It was our pleasure to care for you here at [facility]  It is our hope that we were always able to exceed the expected standards for your care during your stay  You were hospitalized due to congestive heart failure  You were cared for on the 3rd floor by Will Hernandez PA-C under the service of Callum Ahmadi MD with the [facility] who covers for your primary care physician (PCP), [place], DO, while you were hospitalized  If you have any questions or concerns related to this hospitalization, you may contact us at [phone]  For follow up as well as any medication refills, we recommend that you follow up with your primary care physician  A registered nurse will reach out to you by phone within a few days after your discharge to answer any additional questions that you may have after going home  However, at this time we provide for you here, the most important instructions / recommendations at discharge:     Notable Medication Adjustments -   RESTART your Torsemide 20 mg tablets - Take 1 tablet by mouth twice a day   STOP the Candesartan and HCTZ  Continue the rest of your home medications   Testing Required after Discharge -   Check you kidney function on Wednesday   Important follow up information -   Follow up with Dr Timoteo Dean as planned and he will make further adjustments as necessary   Other Instructions -   None  Please review this entire after visit summary as additional general instructions including medication list, appointments, activity, diet, any pertinent wound care, and other additional recommendations from your care team that may be provided for you        Sincerely,     Will Hernandez PA-C

## 2022-05-09 NOTE — DISCHARGE INSTRUCTIONS
Take your medications as directed, and keep your follow up appointments  Adhere to a heart healthy lifestyle, maintaining a low sodium diet  Daily weight and record    If your weight increases 2-3 lbs in one day, or 5 lbs in 2 days, you are short of breath or have lower extremity swelling, please call Nurse Carroll Ly at  [place] [address] office  at [phone]

## 2022-05-09 NOTE — ASSESSMENT & PLAN NOTE
· History of V-tach    Status post ICD placement  · Patient denies ICD firing  · Follows outpatient with Dr Queenie Lamb  · Continue Sotalol

## 2022-05-09 NOTE — PROGRESS NOTES
General Cardiology   Progress Note -  [facility]   Viki Olmos 81 y o  male MRN: [id number]    Unit/Bed#: S -01 Encounter: [id number]    Assessment:    1  Acute on chronic HFpEF:  Presents with progressive shortness of breath and lower extremity edema in the setting of torsemide discontinuation 1 month ago  CXR and CTA with vascular congestion    Currently on IV Lasix 80 mg BID  · Echocardiogram 05/08/2022:   EF 60% with no WMA, dilated RV with moderately reduced function, mild SID, normal bioprosthetic AV function, mild-moderate MR, moderate TR  · Home diuretic regimen:  HCTZ only  Torsemide 20 mg BID was stopped 1 month ago when he went on a combination candesartan-HCTZ pill per pharmacy recommendation  · Dry weight:  330 lb per office visit 01/2022  · Weight on admission:  352 lb per bed scale  · Weight today:  328 lb per standing scale  · Net output: -1 5 L  · Volume status appears improved on exam   Continues with lower extremity edema that is close to his baseline  2  History of Endocarditis s/p bioprosthetic AVR:  Normal valve function without regurgitation or stenosis on echocardiogram this admission  3  History of VT s/p ICD:  MDT DC ICD (NOT MRI conditional)  Device interrogation 03/2022 with no significant high rate episodes and normal device function  4  PAF:  Currently maintaining NSR on sotalol 80 mg BID  Anticoagulated on Coumadin with INR 2 44   5  Essential hypertension:  Average /68 on sotalol 80 mg BID and IV Lasix  6  Hyperlipidemia: No lipid panel on file  Maintained on atorvastatin 40 mg daily  7  REMA on CKD stage IIIA:  Baseline creatinine 1 2  Creatinine 1 51 POA, likely cardiorenal in the setting of volume overload  Had improved to 1 3 yesterday    Today has worsened to 1 57   8  History of DVT:  Maintained on Coumadin         Plan/Recommendations:  · Will have patient ambulate halls to ensure symptomatic improvement on room air  · Transition to torsemide 40 mg daily  · Monitor I&Os, renal function, electrolytes and standing weight  · Maintain potassium >4 and magnesium >2  · Low-sodium, fluid-restricted diet  · May need to accept a higher creatinine in order to achieve euvolemia  · Continue remaining cardiac medications  · Follow-up with ODESSA Madrigal on 05/25/2022  _____________________________________________________________________________________    Subjective    Patient seen and examined  No acute events overnight  He denies any shortness of breath, palpitations, lightheadedness or dizziness  Reports significant UOP stating he has another full urine L in the bathroom that has not been measured yet  His lower extremity edema feels close to his baseline  He wears compression stockings daily  Review of Systems   Constitutional: Negative  Negative for chills  Cardiovascular: Positive for leg swelling  Negative for chest pain, dyspnea on exertion, near-syncope, orthopnea, palpitations, paroxysmal nocturnal dyspnea and syncope  Respiratory: Negative  Negative for cough, shortness of breath and wheezing  Endocrine: Negative  Hematologic/Lymphatic: Negative  Skin: Negative  Musculoskeletal: Negative  Gastrointestinal: Negative  Negative for diarrhea, nausea and vomiting  Neurological: Negative for dizziness, light-headedness and weakness  Psychiatric/Behavioral: Negative  Negative for altered mental status  All other systems reviewed and are negative  Objective:   Vitals: Blood pressure 122/59, pulse 64, temperature 98 2 °F (36 8 °C), temperature source Oral, resp  rate 18, height 6' 1" (1 854 m), weight (!) 149 kg (328 lb 11 3 oz), SpO2 96 %  ,     Wt Readings from Last 3 Encounters:   05/09/22 (!) 149 kg (328 lb 11 3 oz)   04/18/22 (!) 159 kg (350 lb 12 8 oz)   04/04/22 (!) 150 kg (330 lb)        Lab Results   Component Value Date    CREATININE 1 57 (H) 05/09/2022    CREATININE 1 37 (H) 05/08/2022    CREATININE 1 51 (H) 05/07/2022         Body mass index is 43 37 kg/m²  ,     Systolic (15CSK), RES:064 , Min:122 , WVT:196     Diastolic (78NPH), LDU:93, Min:59, Max:77          Intake/Output Summary (Last 24 hours) at 5/9/2022 0948  Last data filed at 5/9/2022 0452  Gross per 24 hour   Intake 480 ml   Output 600 ml   Net -120 ml     Weight (last 2 days)     Date/Time Weight    05/09/22 0551 149 (328 71)    05/09/22 0500 149 (328 71)    05/08/22 0810 149 (329)    05/08/22 0600 149 (329 37)    05/08/22 0300 149 (329 37)    05/07/22 2115 156 (344 58)    05/07/22 2004 156 (344 58)    05/07/22 1600 160 (352)            Telemetry Review: No significant arrhythmias seen on telemetry review  Physical Exam  Vitals and nursing note reviewed  Constitutional:       General: He is not in acute distress  Appearance: He is well-developed  He is obese  Comments: On RA in NAD   HENT:      Head: Normocephalic and atraumatic  Neck:      Vascular: No JVD  Cardiovascular:      Rate and Rhythm: Normal rate and regular rhythm  Heart sounds: Normal heart sounds  No murmur heard  No friction rub  No gallop  Pulmonary:      Effort: Pulmonary effort is normal  No respiratory distress  Breath sounds: Normal breath sounds  No wheezing or rales  Chest:      Chest wall: No tenderness  Abdominal:      General: Bowel sounds are normal  There is no distension  Palpations: Abdomen is soft  Tenderness: There is no abdominal tenderness  Musculoskeletal:         General: No tenderness  Normal range of motion  Cervical back: Normal range of motion and neck supple  Right lower leg: Edema present  Left lower leg: Edema present  Skin:     General: Skin is warm and dry  Coloration: Skin is not pale  Findings: No erythema  Comments: Venous stasis changes noted bilateral lower extremities   Neurological:      Mental Status: He is alert and oriented to person, place, and time     Psychiatric: Mood and Affect: Mood normal          Behavior: Behavior normal          Thought Content:  Thought content normal          Judgment: Judgment normal          LABORATORY RESULTS      CBC with diff:   Results from last 7 days   Lab Units 22  1550   WBC Thousand/uL 6 10   HEMOGLOBIN g/dL 10 1*   HEMATOCRIT % 32 4*   MCV fL 95   PLATELETS Thousands/uL 221   MCH pg 29 4   MCHC g/dL 31 2*   RDW % 14 8   MPV fL 10 0   NRBC AUTO /100 WBCs 0       CMP:  Results from last 7 days   Lab Units 22  0452 22  0452 22  1550   POTASSIUM mmol/L 3 8 4 0 4 4   CHLORIDE mmol/L 104 103 106   CO2 mmol/L 26 23 25   BUN mg/dL 40* 31* 33*   CREATININE mg/dL 1 57* 1 37* 1 51*   CALCIUM mg/dL 8 7 9 1 8 9   AST U/L  --   --  15   ALT U/L  --   --  13   ALK PHOS U/L  --   --  61   EGFR ml/min/1 73sq m 39 46 41       BMP:  Results from last 7 days   Lab Units 22  0452 22  0452 22  1550   POTASSIUM mmol/L 3 8 4 0 4 4   CHLORIDE mmol/L 104 103 106   CO2 mmol/L 26 23 25   BUN mg/dL 40* 31* 33*   CREATININE mg/dL 1 57* 1 37* 1 51*   CALCIUM mg/dL 8 7 9 1 8 9       No results found for: NTBNP          Results from last 7 days   Lab Units 22  1550   MAGNESIUM mg/dL 1 6*                     Results from last 7 days   Lab Units 22  1954   INR  2 44*       Lipid Profile:   No results found for: CHOL  No results found for: HDL  No results found for: LDLCALC  No results found for: TRIG    Cardiac testing:   Results for orders placed during the hospital encounter of 20    Echo complete with contrast if indicated    Narrative  [facility]  [facility], [address] [place] [address]  [phone]    Transthoracic Echocardiogram  2D, M-mode, Doppler, and Color Doppler    Study date:  2020    Patient: Maryann Sterling  MR number: [id number]  Account number: [de-identified]  : 1935  Age: 81 years  Gender: Male  Status: Outpatient  Location: [address] [place] [address] [facility]  Height: 73 in  Weight: 351 3 lb  BP: 152/ 73 mmHg    Indications: PAF; Acute on chronic diastolic HF;s/p AVR  Diagnoses: I35 9 - Nonrheumatic aortic valve disorder, unspecified, I48 0 - Atrial fibrillation, I50 9 - Heart failure, unspecified    Sonographer:  ABBY Novoa  Interpreting Physician:  Marc Arvizu MD  Primary Physician:  Brandee Stevens DO  Referring Physician:  Kt Fofana MD  Group:  [id number]  Cardiology Fellow:  Leonard Harris DO    SUMMARY    PROCEDURE INFORMATION:  This was a technically difficult study  Echocardiographic views were limited due to restricted patient mobility, poor patient compliance, poor acoustic window availability, decreased penetration, and lung interference  LEFT VENTRICLE:  Systolic function was normal  Ejection fraction was estimated to be 55 %  Although no diagnostic regional wall motion abnormality was identified, this possibility cannot be completely excluded on the basis of this study  Wall thickness was mildly to moderately increased  There was mild concentric hypertrophy  RIGHT VENTRICLE:  The ventricle was mildly dilated  Wall thickness was mildly increased  LEFT ATRIUM:  The atrium was mildly to moderately dilated  RIGHT ATRIUM:  The atrium was mildly to moderately dilated  MITRAL VALVE:  There was mild to moderate annular calcification  There was mild regurgitation  AORTIC VALVE:  A bioprosthesis was present  It exhibited normal function  The peak valve velocity was 197 cm/s  The mean valve velocity was 152 cm/s  Valve peak gradient was 16 mmHg  Valve mean gradient was 9 mmHg  Estimated aortic valve area (by VTI) was 2 16 cmï¾²  TRICUSPID VALVE:  There was mild regurgitation  Pulmonary artery systolic pressure was mildly increased  Estimated peak PA pressure was 40 mmHg  PULMONIC VALVE:  There was mild regurgitation  HISTORY: PRIOR HISTORY: HTN;s/p ICD;VT;SOB; Morbid obesity  PROCEDURE: The study was performed in the [address] [facility] [place]  This was a routine study  The transthoracic approach was used  The study included complete 2D imaging, M-mode, complete spectral Doppler, and color Doppler  The  heart rate was 67 bpm, at the start of the study  Images were obtained from the parasternal, apical, subcostal, and suprasternal notch acoustic windows  Echocardiographic views were limited due to restricted patient mobility, poor patient  compliance, poor acoustic window availability, decreased penetration, and lung interference  This was a technically difficult study  LEFT VENTRICLE: Size was normal  Systolic function was normal  Ejection fraction was estimated to be 55 %  Although no diagnostic regional wall motion abnormality was identified, this possibility cannot be completely excluded on the basis  of this study  Wall thickness was mildly to moderately increased  There was mild concentric hypertrophy  RIGHT VENTRICLE: The ventricle was mildly dilated  Systolic function was low normal  Wall thickness was mildly increased  A pacing wire was present in the ventricular cavity  LEFT ATRIUM: The atrium was mildly to moderately dilated  RIGHT ATRIUM: The atrium was mildly to moderately dilated  MITRAL VALVE: There was mild to moderate annular calcification  There was normal leaflet separation  DOPPLER: The transmitral velocity was within the normal range  There was no evidence for stenosis  There was mild regurgitation  AORTIC VALVE: A bioprosthesis was present  It exhibited normal function  TRICUSPID VALVE: The valve structure was normal  There was normal leaflet separation  DOPPLER: The transtricuspid velocity was within the normal range  There was no evidence for stenosis  There was mild regurgitation  Pulmonary artery  systolic pressure was mildly increased  Estimated peak PA pressure was 40 mmHg      PULMONIC VALVE: Leaflets exhibited normal thickness, no calcification, and normal cuspal separation  DOPPLER: The transpulmonic velocity was within the normal range  There was mild regurgitation  PERICARDIUM: There was no pericardial effusion  AORTA: The root exhibited normal size  SYSTEMIC VEINS: IVC: The inferior vena cava was not well visualized  MEASUREMENT TABLES    2D MEASUREMENTS  LVOT   (Reference normals)  Diam   23 mm   (--)    DOPPLER MEASUREMENTS  LVOT   (Reference normals)  Peak annie   101 cm/s   (--)  Mean annie   70 cm/s   (--)  VTI   23 cm   (--)  Peak gradient   4 mmHg   (--)  Mean gradient   2 2 mmHg   (--)  Stroke vol   95 56 ml   (--)  Aortic valve   (Reference normals)  Peak annie   197 cm/s   (--)  Mean annie   152 cm/s   (--)  VTI   44 cm   (--)  Peak gradient   16 mmHg   (--)  Mean gradient   9 mmHg   (--)  Obstr index, VTI   0 52    (--)  Valve area, VTI   2 16 cmï¾²   (--)  Area index, VTI   0 79 cmï¾²/mï¾²   (--)  Obstr index, Vmax   0 51    (--)  Valve area, Vmax   2 12 cmï¾²   (--)  Area index, Vmax   0 77 cmï¾²/mï¾²   (--)  Obstr index, Vmean   0 46    (--)  Valve area, Vmean   1 91 cmï¾²   (--)  Area index, Vmean   0 7 cmï¾²/mï¾²   (--)    SYSTEM MEASUREMENT TABLES    2D  %FS: 22 82 %  Ao Diam: 2 49 cm  EDV(Teich): 57 38 ml  EF(Teich): 46 66 %  ESV(Teich): 30 61 ml  IVSd: 1 96 cm  LA Area: 26 79 cm2  LA Diam: 4 97 cm  LVEDV MOD A4C: 112 1 ml  LVEF MOD A4C: 50 26 %  LVESV MOD A4C: 55 76 ml  LVIDd: 3 68 cm  LVIDs: 2 84 cm  LVLd A4C: 8 11 cm  LVLs A4C: 6 99 cm  LVOT Diam: 2 25 cm  LVPWd: 1 98 cm  RA Area: 24 17 cm2  RVIDd: 4 17 cm  SV MOD A4C: 56 34 ml  SV(Teich): 26 78 ml    CW  AV Env  Ti: 296 86 ms  AV VTI: 39 2 cm  AV Vmax: 2 1 m/s  AV Vmean: 1 32 m/s  AV maxP 68 mmHg  AV meanP 71 mmHg  TR Vmax: 2 85 m/s  TR maxP 45 mmHg    MM  TAPSE: 1 69 cm    PW  KISHA (VTI): 2 34 cm2  KISHA Vmax: 1 91 cm2  AVAI (VTI): 0 cm2/m2  AVAI Vmax: 0 cm2/m2  E' Sept: 0 06 m/s  E/E' Sept: 19 55  LVOT Env  Ti: 327 31 ms  LVOT VTI: 22 97 cm  LVOT Vmax: 1 01 m/s  LVOT Vmean: 0 7 m/s  LVOT maxP 04 mmHg  LVOT meanP 24 mmHg  LVSI Dopp: 33 45 ml/m2  LVSV Dopp: 91 66 ml  MV A Juan: 1 43 m/s  MV Dec [place]: 5 21 m/s2  MV DecT: 213 91 ms  MV E Juan: 1 11 m/s  MV E/A Ratio: 0 78  MV PHT: 62 03 ms  MVA By PHT: 3 55 cm2    [employer] Accredited Echocardiography Laboratory    Prepared and electronically signed by    Renetta Augustin MD  Signed [id number] 10:03:59    No results found for this or any previous visit  No results found for this or any previous visit  No valid procedures specified  No results found for this or any previous visit          Meds/Allergies   all current active meds have been reviewed, current meds:   Current Facility-Administered Medications   Medication Dose Route Frequency    acetaminophen (TYLENOL) tablet 650 mg  650 mg Oral Q6H PRN    atorvastatin (LIPITOR) tablet 40 mg  40 mg Oral After Dinner    cholecalciferol (VITAMIN D3) tablet 2,000 Units  2,000 Units Oral Daily    fluticasone (FLONASE) 50 mcg/act nasal spray 2 spray  2 spray Each Nare Daily    furosemide (LASIX) injection 80 mg  80 mg Intravenous BID    insulin lispro (HumaLOG) 100 units/mL subcutaneous injection 1-5 Units  1-5 Units Subcutaneous HS    insulin lispro (HumaLOG) 100 units/mL subcutaneous injection 2-12 Units  2-12 Units Subcutaneous TID AC    multivitamin stress formula tablet 1 tablet  1 tablet Oral Daily    pantoprazole (PROTONIX) EC tablet 40 mg  40 mg Oral Early Morning    potassium chloride (K-DUR,KLOR-CON) CR tablet 20 mEq  20 mEq Oral After Dinner    sodium chloride (PF) 0 9 % injection 3 mL  3 mL Intravenous Q1H PRN    sotalol (BETAPACE) tablet 80 mg  80 mg Oral Q12H    warfarin (COUMADIN) tablet 4 mg  4 mg Oral Once per day on Sun  Sat    [START ON 5/10/2022] warfarin (COUMADIN) tablet 6 mg  6 mg Oral Once per day on     and PTA meds:   Prior to Admission Medications   Prescriptions Last Dose Informant Patient Reported? Taking?    B Complex-C (SUPER B COMPLEX PO)  Self Yes Yes   Sig: Take 1 capsule by mouth daily    HYDROcodone-acetaminophen (NORCO) 5-325 mg per tablet More than a month at Unknown time  Yes No   Sig: Take 1 tablet by mouth every 6 (six) hours as needed     Iron Combinations (NIFEREX) TABS  Self Yes Yes   Sig: Take one tablet daily   Multiple Vitamin (MULTIVITAMINS PO)  Self Yes Yes   Sig: Take 1 tablet by mouth daily   acetaminophen (TYLENOL) 325 mg tablet Past Month at Unknown time Self No Yes   Sig: Take 2 tablets by mouth every 6 (six) hours as needed for mild pain or fever   albuterol (PROVENTIL HFA,VENTOLIN HFA) 90 mcg/act inhaler Past Month at Unknown time Self Yes Yes   Sig: INHALE 2 PUFFS BY MOUTH EVERY 6 HOURS AS NEEDED FOR WHEEZING OR SHORTNESS OF BREATH   amoxicillin (AMOXIL) 500 mg capsule More than a month at Unknown time Self Yes No   Sig: TK 4 CS BEFORE 2 HOURS BEFORE DENTAL OR FACIAL SURGERY WORK   atorvastatin (LIPITOR) 40 mg tablet  Self Yes Yes   Sig: Take 40 mg by mouth daily after dinner Atorvastatin Calcium 40 MG Oral Tablet Take 1 tablet daily  Refills: 0  Active    candesartan (ATACAND) 16 mg tablet  Self No Yes   Sig: Take 1 tablet (16 mg total) by mouth daily   candesartan-hydrochlorothiazide (ATACAND HCT) 16-12 5 MG per tablet   Yes Yes   Sig: Take 1 tablet by mouth daily   cholecalciferol (VITAMIN D3) 1,000 units tablet  Self Yes Yes   Sig: Take 2,000 Units by mouth daily   fluticasone (FLONASE) 50 mcg/act nasal spray Past Month at Unknown time Self Yes Yes   Si sprays as needed Shake liquid and spray    gabapentin (NEURONTIN) 100 mg capsule   Yes Yes   hydrochlorothiazide (HYDRODIURIL) 25 mg tablet  Self Yes Yes   Sig: Take 25 mg by mouth daily    meloxicam (MOBIC) 15 mg tablet  Self Yes Yes   Sig: Take by mouth daily     metFORMIN (GLUCOPHAGE) 1000 MG tablet  Self Yes Yes   Sig: Take 1,000 mg by mouth daily after dinner MetFORMIN HCl 1000 MG (MOD) TB24 Take one tablet daily  Refills: 0  Active    mupirocin (BACTROBAN) 2 % ointment Past Month at Unknown time Self Yes Yes   Sig: as needed    omeprazole (PriLOSEC) 20 mg delayed release capsule  Self Yes Yes   Sig: Omeprazole 20 MG Oral Tablet Delayed Release Take 1 tablet daily  Refills: 0  Active   polyethylene glycol (GLYCOLAX) 17 GM/SCOOP powder   Yes Yes   Sig: Take 17 g by mouth 2 (two) times a day   potassium chloride (K-DUR,KLOR-CON) 20 mEq tablet  Self Yes Yes   Sig: Take 20 mEq by mouth daily after dinner Potassium Chloride Jesusita ER 20 MEQ Oral Tablet Extended Release Take 1 tablet daily  Refills: 0  Active    sotalol (BETAPACE) 80 mg tablet  Self Yes Yes   Sig: Take 80 mg by mouth every 12 (twelve) hours    torsemide (DEMADEX) 20 mg tablet  Self No Yes   Sig: TAKE 1 TABLET(20 MG) BY MOUTH TWICE DAILY   warfarin (COUMADIN) 4 mg tablet  Self No Yes   Sig: Take 1 tablet (4 mg total) by mouth 2 (two) times a week Patient take 4mg everyday but Tuesday and Friday     Patient taking differently: Take 4 mg by mouth 2 (two) times a week 4 or 6 mg as directed   warfarin (COUMADIN) 6 mg tablet  Self Yes Yes   Sig: Take 6 mg by mouth The patient takes on Tuesday and Fridays      Facility-Administered Medications: None     Medications Prior to Admission   Medication    acetaminophen (TYLENOL) 325 mg tablet    albuterol (PROVENTIL HFA,VENTOLIN HFA) 90 mcg/act inhaler    atorvastatin (LIPITOR) 40 mg tablet    B Complex-C (SUPER B COMPLEX PO)    candesartan (ATACAND) 16 mg tablet    candesartan-hydrochlorothiazide (ATACAND HCT) 16-12 5 MG per tablet    cholecalciferol (VITAMIN D3) 1,000 units tablet    fluticasone (FLONASE) 50 mcg/act nasal spray    gabapentin (NEURONTIN) 100 mg capsule    hydrochlorothiazide (HYDRODIURIL) 25 mg tablet    Iron Combinations (NIFEREX) TABS    meloxicam (MOBIC) 15 mg tablet    metFORMIN (GLUCOPHAGE) 1000 MG tablet    Multiple Vitamin (MULTIVITAMINS PO)    mupirocin (BACTROBAN) 2 % ointment    omeprazole (PriLOSEC) 20 mg delayed release capsule    polyethylene glycol (GLYCOLAX) 17 GM/SCOOP powder    potassium chloride (K-DUR,KLOR-CON) 20 mEq tablet    sotalol (BETAPACE) 80 mg tablet    torsemide (DEMADEX) 20 mg tablet    warfarin (COUMADIN) 4 mg tablet    warfarin (COUMADIN) 6 mg tablet    amoxicillin (AMOXIL) 500 mg capsule    HYDROcodone-acetaminophen (NORCO) 5-325 mg per tablet            Counseling / Coordination of Care  Total floor / unit time spent today 20 minutes  Greater than 50% of total time was spent with the patient and / or family counseling and / or coordination of care  ** Please Note: Dragon 360 Dictation voice to text software may have been used in the creation of this document   **

## 2022-05-09 NOTE — ASSESSMENT & PLAN NOTE
Lab Results   Component Value Date    HGBA1C 6 3 (H) 03/01/2022     Recent Labs     05/08/22  1619 05/08/22  2103 05/09/22  0722 05/09/22  1103   POCGLU 132 156* 136 130       Blood Sugar Average: Last 72 hrs:  · (P) 142 [id number]   · Most recent hemoglobin A1c results noted  · Restart home medications

## 2022-05-09 NOTE — ASSESSMENT & PLAN NOTE
Lab Results   Component Value Date    EGFR 39 05/09/2022    EGFR 46 05/08/2022    EGFR 41 05/07/2022    CREATININE 1 57 (H) 05/09/2022    CREATININE 1 37 (H) 05/08/2022    CREATININE 1 51 (H) 05/07/2022     · Baseline creatinine appears to be around 1 2  · Patient presents with creatinine elevated at 1 51 likely in the setting of acute CHF  · Improved to 1 3 with diuresis but then increased to 1 57 again   · Hold ARB pending return to baseline function  · Avoid NSAID's  · Check BMP on Wednesday

## 2022-05-09 NOTE — ASSESSMENT & PLAN NOTE
Wt Readings from Last 3 Encounters:   05/09/22 (!) 149 kg (328 lb 11 3 oz)   04/18/22 (!) 159 kg (350 lb 12 8 oz)   04/04/22 (!) 150 kg (330 lb)     · POA, history of diastolic heart failure on torsemide 20 mg twice daily  · Patient on advise of his pharmacist discontinued torsemide about 1 month ago after he was started on blood pressure medication with HCTZ  · Presents with 1 day history shortness of breath especially with exertion  · CTA chest revealed "1  Findings suggestive of mild congestive heart failure   2  No evidence of acute pulmonary embolus, thoracic aortic aneurysm or dissection"  · Last echocardiogram was in November 2020 which showed EF 55%, mild to moderately dilated left and right atrium, bioprosthetic AV  · Stable for discharge   · Cardiology consultation appreciated   · Restart Torsemide 20 mg BID   · Continue I/O, daily weights

## 2022-05-09 NOTE — PLAN OF CARE
Problem: Potential for Falls  Goal: Patient will remain free of falls  Description: INTERVENTIONS:  - Educate patient/family on patient safety including physical limitations  - Instruct patient to call for assistance with activity   - Consult OT/PT to assist with strengthening/mobility   - Keep Call bell within reach  - Keep bed low and locked with side rails adjusted as appropriate  - Keep care items and personal belongings within reach  - Initiate and maintain comfort rounds  - Make Fall Risk Sign visible to staff  - Offer Toileting every 2 Hours, in advance of need  - Initiate/Maintain bed alarm  - Obtain necessary fall risk management equipment: bed alarm  - Apply yellow socks and bracelet for high fall risk patients  - Consider moving patient to room near nurses station  Outcome: Progressing     Problem: PAIN - ADULT  Goal: Verbalizes/displays adequate comfort level or baseline comfort level  Description: Interventions:  - Encourage patient to monitor pain and request assistance  - Assess pain using appropriate pain scale  - Administer analgesics based on type and severity of pain and evaluate response  - Implement non-pharmacological measures as appropriate and evaluate response  - Consider cultural and social influences on pain and pain management  - Notify physician/advanced practitioner if interventions unsuccessful or patient reports new pain  Outcome: Progressing     Problem: INFECTION - ADULT  Goal: Absence or prevention of progression during hospitalization  Description: INTERVENTIONS:  - Assess and monitor for signs and symptoms of infection  - Monitor lab/diagnostic results  - Monitor all insertion sites, i e  indwelling lines, tubes, and drains  - Monitor endotracheal if appropriate and nasal secretions for changes in amount and color  - [place] appropriate cooling/warming therapies per order  - Administer medications as ordered  - Instruct and encourage patient and family to use good hand hygiene technique  - Identify and instruct in appropriate isolation precautions for identified infection/condition  Outcome: Progressing  Goal: Absence of fever/infection during neutropenic period  Description: INTERVENTIONS:  - Monitor WBC    Outcome: Progressing     Problem: SAFETY ADULT  Goal: Patient will remain free of falls  Description: INTERVENTIONS:  - Educate patient/family on patient safety including physical limitations  - Instruct patient to call for assistance with activity   - Consult OT/PT to assist with strengthening/mobility   - Keep Call bell within reach  - Keep bed low and locked with side rails adjusted as appropriate  - Keep care items and personal belongings within reach  - Initiate and maintain comfort rounds  - Make Fall Risk Sign visible to staff  - Offer Toileting every 2 Hours, in advance of need  - Initiate/Maintain bed alarmalarm  - Obtain necessary fall risk management equipment:bed alarm  - Apply yellow socks and bracelet for high fall risk patients  - Consider moving patient to room near nurses station  Outcome: Progressing  Goal: Maintain or return to baseline ADL function  Description: INTERVENTIONS:  -  Assess patient's ability to carry out ADLs; assess patient's baseline for ADL function and identify physical deficits which impact ability to perform ADLs (bathing, care of mouth/teeth, toileting, grooming, dressing, etc )  - Assess/evaluate cause of self-care deficits   - Assess range of motion  - Assess patient's mobility; develop plan if impaired  - Assess patient's need for assistive devices and provide as appropriate  - Encourage maximum independence but intervene and supervise when necessary  - Involve family in performance of ADLs  - Assess for home care needs following discharge   - Consider OT consult to assist with ADL evaluation and planning for discharge  - Provide patient education as appropriate  Outcome: Progressing  Goal: Maintains/Returns to pre admission functional level  Description: INTERVENTIONS:  - Perform BMAT or MOVE assessment daily    - Set and communicate daily mobility goal to care team and patient/family/caregiver  - Collaborate with rehabilitation services on mobility goals if consulted  - Perform Range of Motion 2 times a day  - Reposition patient every 2 hours    - Dangle patient 2 times a day  - Stand patient 2 times a day  - Ambulate patient 2 times a day  - Out of bed to chair 2 times a day   - Out of bed for meals 2 times a day  - Out of bed for toileting  - Record patient progress and toleration of activity level   Outcome: Progressing

## 2022-05-11 ENCOUNTER — PATIENT OUTREACH (OUTPATIENT)
Dept: CASE MANAGEMENT | Facility: OTHER | Age: 87
End: 2022-05-11

## 2022-05-11 NOTE — PROGRESS NOTES
Patient returned my call  He reports he is at home feeling better  He denies shortness of breath,swelling or chest pressure  Patient is taking his medications as directed  He is a  and returned to work  He has his follow up appointments with cardiology 5/24 for a device check and 5/25 at the office  He is aware of the red flags and I advised him to call the office with a weight gain of 2-3 pounds overnight or 5 pounds in one week  He read the discharge instructions and knows the foods high in salt  He has no needs at this time  He has my contact information and I advised him to call me with questions/concerns  He did consent to another call

## 2022-05-11 NOTE — PROGRESS NOTES
In basket message received with hospital discharge  Chart reviewed  Patient was admitted to [facility] 5/7 with heart failure,diabetes,hypertension S/P AVR and ICD implant  Discharged home on 5/9  Outreach attempted  I left a message on patient's voicemail with my contact information

## 2022-05-13 LAB — BACTERIA BLD CULT: NORMAL

## 2022-05-20 ENCOUNTER — PATIENT OUTREACH (OUTPATIENT)
Dept: CASE MANAGEMENT | Facility: OTHER | Age: 87
End: 2022-05-20

## 2022-05-20 NOTE — PROGRESS NOTES
I spoke with patient who is doing well  He denies shortness of breath or swelling of the legs  His weight has not changed since last outreach and his blood pressures at home have been normal   He has a cardiology appointment 5/23 and I advised him to take his recorded weights  Will continue to follow

## 2022-05-22 PROBLEM — I50.32 CHRONIC HEART FAILURE WITH PRESERVED EJECTION FRACTION (HFPEF) (HCC): Status: ACTIVE | Noted: 2022-05-22

## 2022-05-22 PROBLEM — I50.33 ACUTE ON CHRONIC DIASTOLIC HEART FAILURE (HCC): Status: RESOLVED | Noted: 2018-04-05 | Resolved: 2022-05-22

## 2022-05-22 NOTE — PROGRESS NOTES
Cardiology  Heart Failure   Follow Up Office Visit Note     Lavenia Sicard   81 y o    male   MRN: [id number]  [address]  [address]  [facility] [address] [facility]  [address] [address] [phone]    PCP: Hyacinth Rinne, DO  Cardiologist : Dr Codie Herron            Summary of Recommendations  Low-sodium diet, Heart failure education as below  Nonfasting BMP tomorrow, he gets this drawn at [facility]   His a creatinine was 1 8 a day after discharge  This may reflect diuresis with IV medication, when hospitalized Before changing medications, would recheck his BMP  His baseline creatinine is 1 4-1 6  Follow-up with his PCP for discolored phlegm  Follow up will be scheduled with Dr Codie Herron in a few months      Impression/plan  TATE  His weight has improved  He appears euvolemic  He has a persistent cough productive for gray- green phlegm  He may need antibiotics  I recommend he follow with his PCP  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, recent adm 5/7-5/9/22 for decompensation related to being off his loop diuretics(per an outpatient pharmacist)  He is wearing lower extremity tense  Home wt today 322 lb  Wt Readings from Last 3 Encounters:   05/23/22 (!) 145 kg (320 lb)   05/09/22 (!) 149 kg (328 lb 11 3 oz)   04/18/22 (!) 159 kg (350 lb 12 8 oz)     --beta-blocker:   sotalol 80 mg b i d   --Diuretic:   torsemide 20 mg b i d   prior to admission  HCTZ only  Torsemide 20 mg BID was stopped 1 month ago when he went on a combination candesartan-HCTZ pill  --2 g sodium diet, 1800 cc fluid restriction  Daily weights, call weight gain 2-3 lb in 1 day or 5 lb in 5 days  PAF  Maintained on AARD with sotalol 80 mg b i d  anticoagulation with warfarin monitored by his PCP  Maintaining NSR  History endocarditis; S/P bio AVR 2014  Normal functioning valve on echocardiogram 5/22  History ventricular tachycardia   On sotalol 80 mg q 12  · Status post MDT ICD    Interrogation 03/29/2022: (NOT MRI conditional)  AP 0 4%,  0 1% no significant high rate episodes, normal device function  Chronic left bundle branch block  Pulmonary hypertension  Hypertension, essential  BP  146/94  On sotalol, torsemide 20 b i d  He monitors blood pressures at home  His systolics are in the 856A  Today, he tells me he had a challenging day  Continue to monitor  Hyperlipidemia  On atorvastatin 40 mg/d  Type 2 diabetes mellitus  Hemoglobin A1c 6 2 On metformin  History DVT maintained on warfarin, per his PCP  5/7/22:  INR 2 4  CKD 3  Baseline creatinine 1 4-1 6  Morbid obesity, BMI 43  Cardiac testing  · TTE11/17/2020:  EF 55% with no obvious wall motion abnormalities but this cannot be excluded on the basis of this study, mild concentric LVH, mildly dilated RV, mild-moderate ISD, bioprosthetic aortic valve with normal function, mild MR, mild TR   · TTE 5/8/22  EF 60%  Wall motion is normal   RV cavity size moderately dilated, with moderately reduced systolic function  Mild biatrial enlargement  Bioprosthetic aortic valve present  Mild-to-moderate MR  Moderate TR  Mild pulmonic regurgitation, PASP 70 mm Hg  PASP severely increased                     HPI:   Clari Good is an 60-year-old gentleman who has a history of endocarditis, and is S/P Bio AVR, chronicHFpEF, ventricular tachycardia, S/P ICD, PAF on sotalol, with chronic anticoagulation, essential hypertension, prior DVT  He follows with Dr Floridalma Irizarry and was last seen in the office January 2022  NOted: Underwent R TKR 11/21  Does get some dyspnea on exertion  No chest pain, palpitations  Some RLE edema  Home medication regimen includes atorvastatin 40 mg daily, candesartan-HCTZ 16-12 5 mg daily, sotalol 80 mg BID, torsemide 20 mg BID, Coumadin  Adm 5/7-5/9/22  CC: Worsening SOB and lower extremity edema  His torsemide was stopped a month prior  20 lb over his dry weight    Normally on torsemide 20 mg b i d --->advised by his pharmacist to discontinue torsemide a month prior as he was already on BP medication with HCTZ  EKG showed normal sinus rhythm, first-degree AV block, left bundle branch block   Echocardiogram:  EF 55%  Mild LVH  Normal function bioprosthetic AVR  Lower extremity duplex:  No DVT  CTA: No PE  Mild heart failure  Dx: Acute on chronic heart failure preserved ejection fraction secondary to inadequate diuretics  Discharge weight :328 lb down from 352  OV wt 330 1/22  Discharge creatinine:1 57  Discharge diuretics: Torsemide 20 mg b i d  ---not to be on HCTZ or chlorthalidone      5/23/22  Hospital follow-up-->Acute diastolic heart failure in the setting of being off his loop diuretics  ROS: productive cough for gray /green phlegm, 3-4 times a week-Mostly its clear  Positive TATE, however is improved  No chest pain  He regularly monitor his is blood pressures  Systolics are in the 611H routinely  BP Readings from Last 3 Encounters:   05/23/22 146/94   05/09/22 122/59   04/18/22 154/67     Wt Readings from Last 3 Encounters:   05/23/22 (!) 145 kg (320 lb)   05/09/22 (!) 149 kg (328 lb 11 3 oz)   04/18/22 (!) 159 kg (350 lb 12 8 oz)   Labs 5/10: cr 1 82 BUN 51 K 4 0  INR 1 9  Labs drawn May 10th may reflect hospital medicine/IV diuretics  Would change no medications, and simply recheck his BMP  He tells me he is getting labs drawn tomorrow for his PCP      I have spent  40 minutes with Patient and family today in which greater than 50% of this time was spent in counseling/coordination of care regarding Intructions for management, Patient and family education, Importance of tx compliance and Risk factor reductions  Assessment  Diagnoses and all orders for this visit:    Hospital discharge follow-up    Chronic heart failure with preserved ejection fraction (HFpEF) ([facility] [address] )  -     Basic metabolic panel;  Future    S/P AVR    PAF (paroxysmal atrial fibrillation) ([facility])    Chronic right-sided HF (heart failure) [facility])    Ventricular tachycardia ([place] [address] )    Presence of implantable cardioverter-defibrillator (ICD)    Chronic anticoagulation    Essential hypertension    Chronic venous insufficiency    Type 2 diabetes mellitus with microalbuminuria, without long-term current use of insulin ([place] [address] )        Past Medical History:   Diagnosis Date    Anemia     Arthritis     Atrial fibrillation ([place] [address] )     Basal cell carcinoma 03/22/2022    Tip of Nose    CH[facility]congestive heart failure) ([facility])     Diabetes mellitus ([place] [address] )     Niddm    DVT (deep vein thrombosis) in pregnancy 1966    not in pregnancy    DVT (deep venous thrombosis) ([place] [address] ) 1966    Dyslipidemia     GERD (gastroesophageal reflux disease)     Hyperlipidemia     Hypertension     Irregular heart beat     Afib    Morbid obesity due to excess calories ([place] [address] )     Resolved 9/2/2014     Pulmonary embolism (HCC)     Sepsis ([place] [address] )     Squamous cell skin cancer 07/30/2020    Left posterior scalp    Visual impairment        Review of Systems   Constitutional: Negative for chills  Cardiovascular: Positive for dyspnea on exertion  Negative for chest pain, claudication, cyanosis, irregular heartbeat, leg swelling, near-syncope, orthopnea, palpitations, paroxysmal nocturnal dyspnea and syncope  Respiratory: Positive for shortness of breath  Negative for cough  Gastrointestinal: Negative for heartburn and nausea  Neurological: Negative for dizziness, focal weakness, headaches, light-headedness and weakness  All other systems reviewed and are negative  Allergies   Allergen Reactions    Tramadol Other (See Comments)     intolerance           Current Outpatient Medications:     acetaminophen (TYLENOL) 325 mg tablet, Take 2 tablets by mouth every 6 (six) hours as needed for mild pain or fever, Disp: 30 tablet, Rfl: 0    albuterol (PROVENTIL HFA,VENTOLIN HFA) 90 mcg/act inhaler, INHALE 2 PUFFS BY MOUTH EVERY 6 HOURS AS NEEDED FOR WHEEZING OR SHORTNESS OF BREATH, Disp: , Rfl:     amoxicillin (AMOXIL) 500 mg capsule, TK 4 CS BEFORE 2 HOURS BEFORE DENTAL OR FACIAL SURGERY WORK, Disp: , Rfl: 3    atorvastatin (LIPITOR) 40 mg tablet, Take 40 mg by mouth daily after dinner Atorvastatin Calcium 40 MG Oral Tablet Take 1 tablet daily  Refills: 0  Active , Disp: , Rfl:     B Complex-C (SUPER B COMPLEX PO), Take 1 capsule by mouth daily , Disp: , Rfl:     cholecalciferol (VITAMIN D3) 1,000 units tablet, Take 2,000 Units by mouth daily, Disp: , Rfl:     fluticasone (FLONASE) 50 mcg/act nasal spray, 2 sprays as needed Shake liquid and spray , Disp: , Rfl:     gabapentin (NEURONTIN) 100 mg capsule, , Disp: , Rfl:     HYDROcodone-acetaminophen (NORCO) 5-325 mg per tablet, Take 1 tablet by mouth every 6 (six) hours as needed  , Disp: , Rfl:     metFORMIN (GLUCOPHAGE) 1000 MG tablet, Take 1,000 mg by mouth daily after dinner MetFORMIN HCl 1000 MG (MOD) TB24 Take one tablet daily  Refills: 0  Active , Disp: , Rfl:     Multiple Vitamin (MULTIVITAMINS PO), Take 1 tablet by mouth daily, Disp: , Rfl:     mupirocin (BACTROBAN) 2 % ointment, as needed , Disp: , Rfl:     omeprazole (PriLOSEC) 20 mg delayed release capsule, Omeprazole 20 MG Oral Tablet Delayed Release Take 1 tablet daily  Refills: 0  Active, Disp: , Rfl:     polyethylene glycol (GLYCOLAX) 17 GM/SCOOP powder, Take 17 g by mouth 2 (two) times a day, Disp: , Rfl:     potassium chloride (K-DUR,KLOR-CON) 20 mEq tablet, Take 20 mEq by mouth daily after dinner Potassium Chloride Jesusita ER 20 MEQ Oral Tablet Extended Release Take 1 tablet daily  Refills: 0  Active , Disp: , Rfl:     sotalol (BETAPACE) 80 mg tablet, Take 80 mg by mouth every 12 (twelve) hours , Disp: , Rfl: 2    torsemide (DEMADEX) 20 mg tablet, Take 1 tablet (20 mg total) by mouth 2 (two) times a day, Disp: 180 tablet, Rfl: 0    warfarin (COUMADIN) 4 mg tablet, Take 1 tablet (4 mg total) by mouth 2 (two) times a week Patient take 4mg everyday but Tuesday and Friday  (Patient taking differently: Take 4 mg by mouth 2 (two) times a week 4 or 6 mg as directed), Disp: 90 tablet, Rfl: 0    warfarin (COUMADIN) 6 mg tablet, Take 6 mg by mouth The patient takes on Tuesday and Fridays, Disp: , Rfl:     Iron Combinations (NIFEREX) TABS, Take one tablet daily (Patient not taking: Reported on 5/23/2022), Disp: , Rfl: 5    Social History     Socioeconomic History    Marital status: /Civil Union     Spouse name: Not on file    Number of children: Not on file    Years of education: Not on file    Highest education level: Not on file   Occupational History    Not on file   Tobacco Use    Smoking status: Never Smoker    Smokeless tobacco: Never Used   Vaping Use    Vaping Use: Never used   Substance and Sexual Activity    Alcohol use: Not Currently    Drug use: No    Sexual activity: Not on file   Other Topics Concern    Not on file   Social History Narrative    Not on file     Social Determinants of Health     Financial Resource Strain: Not on file   Food Insecurity: Not on file   Transportation Needs: Not on file   Physical Activity: Not on file   Stress: Not on file   Social Connections: Not on file   Intimate Partner Violence: Not on file   Housing Stability: Not on file       Family History   Problem Relation Age of Onset    Arthritis Mother     Stroke Mother     Arthritis Father     Cancer Father     Arthritis Daughter        Physical Exam  Vitals and nursing note reviewed  Constitutional:       General: He is not in acute distress  Appearance: He is obese  HENT:      Head: Normocephalic and atraumatic  Eyes:      Conjunctiva/sclera: Conjunctivae normal    Cardiovascular:      Rate and Rhythm: Normal rate and regular rhythm  Pulses: Intact distal pulses  Heart sounds: Heart sounds are distant  Pulmonary:      Effort: Pulmonary effort is normal       Breath sounds: Decreased breath sounds present     Abdominal:      General: Bowel sounds are normal       Palpations: Abdomen is soft  Musculoskeletal:         General: Normal range of motion  Cervical back: Normal range of motion and neck supple  Comments: Bilateral lower extremity TEDS on   Skin:     General: Skin is warm and dry  Neurological:      Mental Status: He is alert and oriented to person, place, and time  Vitals: Blood pressure 146/94, pulse 68, height 6' 1" (1 854 m), weight (!) 145 kg (320 lb), SpO2 95 %  Wt Readings from Last 3 Encounters:   22 (!) 145 kg (320 lb)   22 (!) 149 kg (328 lb 11 3 oz)   22 (!) 159 kg (350 lb 12 8 oz)         Labs & Results:  Lab Results   Component Value Date    WBC 6 10 2022    HGB 10 1 (L) 2022    HCT 32 4 (L) 2022    MCV 95 2022     2022     BNP   Date Value Ref Range Status   2022 224 (H) 0 - 100 pg/mL Final   2014 270 (H) 0 - 99 pg/mL Final     Comment:     The above 1 analytes were performed by [id number]     2014 262 (H) 0 - 100 pg/mL Final     Comment:     The above 1 analytes were performed by [facility] [id number]     2014 281 (H) 0 - 99 pg/mL Final     Comment:     The above 1 analytes were performed by [id number]       No components found for: CHEM    Results for orders placed during the hospital encounter of 20    Echo complete with contrast if indicated    Narrative  Can [address]  [address]  [phone]    Transthoracic Echocardiogram  2D, M-mode, Doppler, and Color Doppler    Study date:  2020    Patient: Darlene Hayes  MR number: [id number]  Account number: [de-identified]  : 1935  Age: 81 years  Gender: Male  Status: Outpatient  [facility] [address] [place] [address] [facility]  Height: 73 in  Weight: 351 3 lb  BP: 152/ 73 mmHg    Indications: PAF; Acute on chronic diastolic HF;s/p AVR  Diagnoses: I35 9 - Nonrheumatic aortic valve disorder, unspecified, I48 0 - Atrial fibrillation, I50 9 - Heart failure, unspecified    Sonographer:  ABBY Novoa  Interpreting Physician:  Marc Arvizu MD  Primary Physician:  Brandee Stevens DO  Referring Physician:  Kt Fofana MD  Group:  [id number]  Cardiology Fellow:  Leonard Harris DO    SUMMARY    PROCEDURE INFORMATION:  This was a technically difficult study  Echocardiographic views were limited due to restricted patient mobility, poor patient compliance, poor acoustic window availability, decreased penetration, and lung interference  LEFT VENTRICLE:  Systolic function was normal  Ejection fraction was estimated to be 55 %  Although no diagnostic regional wall motion abnormality was identified, this possibility cannot be completely excluded on the basis of this study  Wall thickness was mildly to moderately increased  There was mild concentric hypertrophy  RIGHT VENTRICLE:  The ventricle was mildly dilated  Wall thickness was mildly increased  LEFT ATRIUM:  The atrium was mildly to moderately dilated  RIGHT ATRIUM:  The atrium was mildly to moderately dilated  MITRAL VALVE:  There was mild to moderate annular calcification  There was mild regurgitation  AORTIC VALVE:  A bioprosthesis was present  It exhibited normal function  The peak valve velocity was 197 cm/s  The mean valve velocity was 152 cm/s  Valve peak gradient was 16 mmHg  Valve mean gradient was 9 mmHg  Estimated aortic valve area (by VTI) was 2 16 cmï¾²  TRICUSPID VALVE:  There was mild regurgitation  Pulmonary artery systolic pressure was mildly increased  Estimated peak PA pressure was 40 mmHg  PULMONIC VALVE:  There was mild regurgitation  HISTORY: PRIOR HISTORY: HTN;s/p ICD;VT;SOB; Morbid obesity  PROCEDURE: The study was performed in the [address]  This was a routine study   The transthoracic approach was used  The study included complete 2D imaging, M-mode, complete spectral Doppler, and color Doppler  The  heart rate was 67 bpm, at the start of the study  Images were obtained from the parasternal, apical, subcostal, and suprasternal notch acoustic windows  Echocardiographic views were limited due to restricted patient mobility, poor patient  compliance, poor acoustic window availability, decreased penetration, and lung interference  This was a technically difficult study  LEFT VENTRICLE: Size was normal  Systolic function was normal  Ejection fraction was estimated to be 55 %  Although no diagnostic regional wall motion abnormality was identified, this possibility cannot be completely excluded on the basis  of this study  Wall thickness was mildly to moderately increased  There was mild concentric hypertrophy  RIGHT VENTRICLE: The ventricle was mildly dilated  Systolic function was low normal  Wall thickness was mildly increased  A pacing wire was present in the ventricular cavity  LEFT ATRIUM: The atrium was mildly to moderately dilated  RIGHT ATRIUM: The atrium was mildly to moderately dilated  MITRAL VALVE: There was mild to moderate annular calcification  There was normal leaflet separation  DOPPLER: The transmitral velocity was within the normal range  There was no evidence for stenosis  There was mild regurgitation  AORTIC VALVE: A bioprosthesis was present  It exhibited normal function  TRICUSPID VALVE: The valve structure was normal  There was normal leaflet separation  DOPPLER: The transtricuspid velocity was within the normal range  There was no evidence for stenosis  There was mild regurgitation  Pulmonary artery  systolic pressure was mildly increased  Estimated peak PA pressure was 40 mmHg  PULMONIC VALVE: Leaflets exhibited normal thickness, no calcification, and normal cuspal separation   DOPPLER: The transpulmonic velocity was within the normal range  There was mild regurgitation  PERICARDIUM: There was no pericardial effusion  AORTA: The root exhibited normal size  SYSTEMIC VEINS: IVC: The inferior vena cava was not well visualized  MEASUREMENT TABLES    2D MEASUREMENTS  LVOT   (Reference normals)  Diam   23 mm   (--)    DOPPLER MEASUREMENTS  LVOT   (Reference normals)  Peak annie   101 cm/s   (--)  Mean annie   70 cm/s   (--)  VTI   23 cm   (--)  Peak gradient   4 mmHg   (--)  Mean gradient   2 2 mmHg   (--)  Stroke vol   95 56 ml   (--)  Aortic valve   (Reference normals)  Peak annie   197 cm/s   (--)  Mean annie   152 cm/s   (--)  VTI   44 cm   (--)  Peak gradient   16 mmHg   (--)  Mean gradient   9 mmHg   (--)  Obstr index, VTI   0 52    (--)  Valve area, VTI   2 16 cmï¾²   (--)  Area index, VTI   0 79 cmï¾²/mï¾²   (--)  Obstr index, Vmax   0 51    (--)  Valve area, Vmax   2 12 cmï¾²   (--)  Area index, Vmax   0 77 cmï¾²/mï¾²   (--)  Obstr index, Vmean   0 46    (--)  Valve area, Vmean   1 91 cmï¾²   (--)  Area index, Vmean   0 7 cmï¾²/mï¾²   (--)    SYSTEM MEASUREMENT TABLES    2D  %FS: 22 82 %  Ao Diam: 2 49 cm  EDV(Teich): 57 38 ml  EF(Teich): 46 66 %  ESV(Teich): 30 61 ml  IVSd: 1 96 cm  LA Area: 26 79 cm2  LA Diam: 4 97 cm  LVEDV MOD A4C: 112 1 ml  LVEF MOD A4C: 50 26 %  LVESV MOD A4C: 55 76 ml  LVIDd: 3 68 cm  LVIDs: 2 84 cm  LVLd A4C: 8 11 cm  LVLs A4C: 6 99 cm  LVOT Diam: 2 25 cm  LVPWd: 1 98 cm  RA Area: 24 17 cm2  RVIDd: 4 17 cm  SV MOD A4C: 56 34 ml  SV(Teich): 26 78 ml    CW  AV Env  Ti: 296 86 ms  AV VTI: 39 2 cm  AV Vmax: 2 1 m/s  AV Vmean: 1 32 m/s  AV maxP 68 mmHg  AV meanP 71 mmHg  TR Vmax: 2 85 m/s  TR maxP 45 mmHg    MM  TAPSE: 1 69 cm    PW  KISHA (VTI): 2 34 cm2  KISHA Vmax: 1 91 cm2  AVAI (VTI): 0 cm2/m2  AVAI Vmax: 0 cm2/m2  E' Sept: 0 06 m/s  E/E' Sept: 19 55  LVOT Env  Ti: 327 31 ms  LVOT VTI: 22 97 cm  LVOT Vmax: 1 01 m/s  LVOT Vmean: 0 7 m/s  LVOT maxP 04 mmHg  LVOT meanP 24 mmHg  LVSI Dopp: 33 45 ml/m2  LVSV Dopp: 91 66 ml  MV A Juan: 1 43 m/s  MV Dec Barbour: 5 21 m/s2  MV DecT: 213 91 ms  MV E Juan: 1 11 m/s  MV E/A Ratio: 0 78  MV PHT: 62 03 ms  MVA By PHT: 3 55 cm2    [employer] Accredited Echocardi[facility]    Prepared and electronically signed by    Seema Savage MD  Signed [id number] 10:03:59    No results found for this or any previous visit  This note was completed in part utilizing m-modal fluency direct voice recognition software  Grammatical errors, random word insertion, spelling mistakes, and incomplete sentences may be an occasional consequence of the system secondary to software limitations, ambient noise and hardware issues  At the time of dictation, efforts were made to edit, clarify and /or correct errors  Please read the chart carefully and recognize, using context, where substitutions have occurred    If you have any questions or concerns about the context, text or information contained within the body of this dictation, please contact myself, the provider, for further clarification Rhombic Flap Text: The defect edges were debeveled with a #15 scalpel blade.  Given the location of the defect and the proximity to free margins a rhombic flap was deemed most appropriate.  Using a sterile surgical marker, an appropriate rhombic flap was drawn incorporating the defect.    The area thus outlined was incised deep to adipose tissue with a #15 scalpel blade.  The skin margins were undermined to an appropriate distance in all directions utilizing iris scissors.

## 2022-05-23 ENCOUNTER — OFFICE VISIT (OUTPATIENT)
Dept: CARDIOLOGY CLINIC | Facility: CLINIC | Age: 87
End: 2022-05-23
Payer: MEDICARE

## 2022-05-23 VITALS
BODY MASS INDEX: 41.75 KG/M2 | HEART RATE: 68 BPM | WEIGHT: 315 LBS | SYSTOLIC BLOOD PRESSURE: 146 MMHG | OXYGEN SATURATION: 95 % | DIASTOLIC BLOOD PRESSURE: 94 MMHG | HEIGHT: 73 IN

## 2022-05-23 DIAGNOSIS — Z79.01 CHRONIC ANTICOAGULATION: ICD-10-CM

## 2022-05-23 DIAGNOSIS — I50.812 CHRONIC RIGHT-SIDED HF (HEART FAILURE) (HCC): ICD-10-CM

## 2022-05-23 DIAGNOSIS — Z95.2 S/P AVR: ICD-10-CM

## 2022-05-23 DIAGNOSIS — I87.2 CHRONIC VENOUS INSUFFICIENCY: ICD-10-CM

## 2022-05-23 DIAGNOSIS — I48.0 PAF (PAROXYSMAL ATRIAL FIBRILLATION) (HCC): ICD-10-CM

## 2022-05-23 DIAGNOSIS — I10 ESSENTIAL HYPERTENSION: ICD-10-CM

## 2022-05-23 DIAGNOSIS — R80.9 TYPE 2 DIABETES MELLITUS WITH MICROALBUMINURIA, WITHOUT LONG-TERM CURRENT USE OF INSULIN (HCC): ICD-10-CM

## 2022-05-23 DIAGNOSIS — Z09 HOSPITAL DISCHARGE FOLLOW-UP: Primary | ICD-10-CM

## 2022-05-23 DIAGNOSIS — I50.32 CHRONIC HEART FAILURE WITH PRESERVED EJECTION FRACTION (HFPEF) (HCC): ICD-10-CM

## 2022-05-23 DIAGNOSIS — E11.29 TYPE 2 DIABETES MELLITUS WITH MICROALBUMINURIA, WITHOUT LONG-TERM CURRENT USE OF INSULIN (HCC): ICD-10-CM

## 2022-05-23 DIAGNOSIS — I47.2 VENTRICULAR TACHYCARDIA (HCC): ICD-10-CM

## 2022-05-23 DIAGNOSIS — Z95.810 PRESENCE OF IMPLANTABLE CARDIOVERTER-DEFIBRILLATOR (ICD): ICD-10-CM

## 2022-05-23 PROCEDURE — 99215 OFFICE O/P EST HI 40 MIN: CPT | Performed by: NURSE PRACTITIONER

## 2022-05-23 NOTE — LETTER
May 23, 2022     Annika Bui DO  [address] [address]    Patient: Tess Bowman   YOB: 1935   Date of Visit: 5/23/2022       Dear Dr Erin Aguilar: Thank you for referring Racheal Payne to me for evaluation  Below are my notes for this consultation  If you have questions, please do not hesitate to call me  I look forward to following your patient along with you  Sincerely,        ODESSA Grimes        CC: MD Seb Alonso CRNP  5/23/2022  4:02 PM  Sign when Signing Visit  Cardiology  Heart Failure   Follow Up Office Visit Note     Tess Bowman   81 y o    male   MRN: [id number]  [address]  [address]  [address]  [address] [facility]  [phone]    PCP: Annika Bui DO  Cardiologist : Dr Jm Hickman            Summary of Recommendations  Low-sodium diet, Heart failure education as below  Nonfasting BMP tomorrow, he gets this drawn at [facility]   His a creatinine was 1 8 a day after discharge  This may reflect diuresis with IV medication, when hospitalized Before changing medications, would recheck his BMP  His baseline creatinine is 1 4-1 6  Follow-up with his PCP for discolored phlegm  Follow up will be scheduled with Dr Jm Hickman in a few months      Impression/plan  TATE  His weight has improved  He appears euvolemic  He has a persistent cough productive for gray- green phlegm  He may need antibiotics  I recommend he follow with his PCP  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, recent adm 5/7-5/9/22 for decompensation related to being off his loop diuretics(per an outpatient pharmacist)    He is wearing lower extremity tense  Home wt today 322 lb  Wt Readings from Last 3 Encounters:   05/23/22 (!) 145 kg (320 lb)   05/09/22 (!) 149 kg (328 lb 11 3 oz)   04/18/22 (!) 159 kg (350 lb 12 8 oz)     --beta-blocker:   sotalol 80 mg b i d   --Diuretic:   torsemide 20 mg b i d   prior to admission  HCTZ only  Torsemide 20 mg BID was stopped 1 month ago when he went on a combination candesartan-HCTZ pill  --2 g sodium diet, 1800 cc fluid restriction  Daily weights, call weight gain 2-3 lb in 1 day or 5 lb in 5 days  PAF  Maintained on AARD with sotalol 80 mg b i d  anticoagulation with warfarin monitored by his PCP  Maintaining NSR  History endocarditis; S/P bio AVR 2014  Normal functioning valve on echocardiogram 5/22  History ventricular tachycardia   On sotalol 80 mg q 12  · Status post MDT ICD  Interrogation 03/29/2022: (NOT MRI conditional)  AP 0 4%,  0 1% no significant high rate episodes, normal device function  Chronic left bundle branch block  Pulmonary hypertension  Hypertension, essential  BP  146/94  On sotalol, torsemide 20 b i d  He monitors blood pressures at home  His systolics are in the 783E  Today, he tells me he had a challenging day  Continue to monitor  Hyperlipidemia  On atorvastatin 40 mg/d  Type 2 diabetes mellitus  Hemoglobin A1c 6 2 On metformin  History DVT maintained on warfarin, per his PCP  5/7/22:  INR 2 4  CKD 3  Baseline creatinine 1 4-1 6  Morbid obesity, BMI 43  Cardiac testing  · TTE11/17/2020:  EF 55% with no obvious wall motion abnormalities but this cannot be excluded on the basis of this study, mild concentric LVH, mildly dilated RV, mild-moderate SID, bioprosthetic aortic valve with normal function, mild MR, mild TR   · TTE 5/8/22  EF 60%  Wall motion is normal   RV cavity size moderately dilated, with moderately reduced systolic function  Mild biatrial enlargement  Bioprosthetic aortic valve present  Mild-to-moderate MR  Moderate TR  Mild pulmonic regurgitation, PASP 70 mm Hg    PASP severely increased                     HPI:   Micheal Speaker is an 28-year-old gentleman who has a history of endocarditis, and is S/P Bio AVR, chronicHFpEF, ventricular tachycardia, S/P ICD, PAF on sotalol, with chronic anticoagulation, essential hypertension, prior DVT  He follows with Dr Jluiane English and was last seen in the office January 2022  NOted: Underwent R TKR 11/21  Does get some dyspnea on exertion  No chest pain, palpitations  Some RLE edema  Home medication regimen includes atorvastatin 40 mg daily, candesartan-HCTZ 16-12 5 mg daily, sotalol 80 mg BID, torsemide 20 mg BID, Coumadin  Adm 5/7-5/9/22  CC: Worsening SOB and lower extremity edema  His torsemide was stopped a month prior  20 lb over his dry weight  Normally on torsemide 20 mg b i d --->advised by his pharmacist to discontinue torsemide a month prior as he was already on BP medication with HCTZ  EKG showed normal sinus rhythm, first-degree AV block, left bundle branch block   Echocardiogram:  EF 55%  Mild LVH  Normal function bioprosthetic AVR  Lower extremity duplex:  No DVT  CTA: No PE  Mild heart failure  Dx: Acute on chronic heart failure preserved ejection fraction secondary to inadequate diuretics  Discharge weight :328 lb down from 352  OV wt 330 1/22  Discharge creatinine:1 57  Discharge diuretics: Torsemide 20 mg b i d  ---not to be on HCTZ or chlorthalidone      5/23/22  Hospital follow-up-->Acute diastolic heart failure in the setting of being off his loop diuretics  ROS: productive cough for gray /green phlegm, 3-4 times a week-Mostly its clear  Positive TATE, however is improved  No chest pain  He regularly monitor his is blood pressures  Systolics are in the 624X routinely  BP Readings from Last 3 Encounters:   05/23/22 146/94   05/09/22 122/59   04/18/22 154/67     Wt Readings from Last 3 Encounters:   05/23/22 (!) 145 kg (320 lb)   05/09/22 (!) 149 kg (328 lb 11 3 oz)   04/18/22 (!) 159 kg (350 lb 12 8 oz)   Labs 5/10: cr 1 82 BUN 51 K 4 0  INR 1 9  Labs drawn May 10th may reflect hospital medicine/IV diuretics  Would change no medications, and simply recheck his BMP    He tells me he is getting labs drawn tomorrow for his PCP      I have spent  40 minutes with Patient and family today in which greater than 50% of this time was spent in counseling/coordination of care regarding Intructions for management, Patient and family education, Importance of tx compliance and Risk factor reductions  Assessment  Diagnoses and all orders for this visit:    Hospital discharge follow-up    Chronic heart failure with preserved ejection fraction (HFpEF) ([facility] )  -     Basic metabolic panel; Future    S/P AVR    PAF (paroxysmal atrial fibrillation) (HCC)    Chronic right-sided HF (heart failure) ([facility])    Ventricular tachycardia (HCC)    Presence of implantable cardioverter-defibrillator (ICD)    Chronic anticoagulation    Essential hypertension    Chronic venous insufficiency    Type 2 diabetes mellitus with microalbuminuria, without long-term current use of insulin ([facility])        Past Medical History:   Diagnosis Date    Anemia     Arthritis     Atrial fibrillation ([facility] )     Basal cell carcinoma 03/22/2022    Tip of Nose    CHF (congestive heart failure) ([facility] )     Diabetes mellitus ([facility] )     Niddm    DVT (deep vein thrombosis) in pregnancy 1966    not in pregnancy    DVT (deep venous thrombosis) ([place] [address] ) 1966    Dyslipidemia     GERD (gastroesophageal reflux disease)     Hyperlipidemia     Hypertension     Irregular heart beat     Afib    Morbid obesity due to excess calories ([facility] )     Resolved 9/2/2014     Pulmonary embolism ([place] [address] )     Sepsis ([place] [address] )     Squamous cell skin cancer 07/30/2020    Left posterior scalp    Visual impairment        Review of Systems   Constitutional: Negative for chills  Cardiovascular: Positive for dyspnea on exertion  Negative for chest pain, claudication, cyanosis, irregular heartbeat, leg swelling, near-syncope, orthopnea, palpitations, paroxysmal nocturnal dyspnea and syncope  Respiratory: Positive for shortness of breath  Negative for cough  Gastrointestinal: Negative for heartburn and nausea     Neurological: Negative for dizziness, focal weakness, headaches, light-headedness and weakness  All other systems reviewed and are negative  Allergies   Allergen Reactions    Tramadol Other (See Comments)     intolerance           Current Outpatient Medications:     acetaminophen (TYLENOL) 325 mg tablet, Take 2 tablets by mouth every 6 (six) hours as needed for mild pain or fever, Disp: 30 tablet, Rfl: 0    albuterol (PROVENTIL HFA,VENTOLIN HFA) 90 mcg/act inhaler, INHALE 2 PUFFS BY MOUTH EVERY 6 HOURS AS NEEDED FOR WHEEZING OR SHORTNESS OF BREATH, Disp: , Rfl:     amoxicillin (AMOXIL) 500 mg capsule, TK 4 CS BEFORE 2 HOURS BEFORE DENTAL OR FACIAL SURGERY WORK, Disp: , Rfl: 3    atorvastatin (LIPITOR) 40 mg tablet, Take 40 mg by mouth daily after dinner Atorvastatin Calcium 40 MG Oral Tablet Take 1 tablet daily  Refills: 0  Active , Disp: , Rfl:     B Complex-C (SUPER B COMPLEX PO), Take 1 capsule by mouth daily , Disp: , Rfl:     cholecalciferol (VITAMIN D3) 1,000 units tablet, Take 2,000 Units by mouth daily, Disp: , Rfl:     fluticasone (FLONASE) 50 mcg/act nasal spray, 2 sprays as needed Shake liquid and spray , Disp: , Rfl:     gabapentin (NEURONTIN) 100 mg capsule, , Disp: , Rfl:     HYDROcodone-acetaminophen (NORCO) 5-325 mg per tablet, Take 1 tablet by mouth every 6 (six) hours as needed  , Disp: , Rfl:     metFORMIN (GLUCOPHAGE) 1000 MG tablet, Take 1,000 mg by mouth daily after dinner MetFORMIN HCl 1000 MG (MOD) TB24 Take one tablet daily  Refills: 0  Active , Disp: , Rfl:     Multiple Vitamin (MULTIVITAMINS PO), Take 1 tablet by mouth daily, Disp: , Rfl:     mupirocin (BACTROBAN) 2 % ointment, as needed , Disp: , Rfl:     omeprazole (PriLOSEC) 20 mg delayed release capsule, Omeprazole 20 MG Oral Tablet Delayed Release Take 1 tablet daily  Refills: 0  Active, Disp: , Rfl:     polyethylene glycol (GLYCOLAX) 17 GM/SCOOP powder, Take 17 g by mouth 2 (two) times a day, Disp: , Rfl:     potassium chloride (K-DUR,KLOR-CON) 20 mEq tablet, Take 20 mEq by mouth daily after dinner Potassium Chloride Jesusita ER 20 MEQ Oral Tablet Extended Release Take 1 tablet daily  Refills: 0  Active , Disp: , Rfl:     sotalol (BETAPACE) 80 mg tablet, Take 80 mg by mouth every 12 (twelve) hours , Disp: , Rfl: 2    torsemide (DEMADEX) 20 mg tablet, Take 1 tablet (20 mg total) by mouth 2 (two) times a day, Disp: 180 tablet, Rfl: 0    warfarin (COUMADIN) 4 mg tablet, Take 1 tablet (4 mg total) by mouth 2 (two) times a week Patient take 4mg everyday but Tuesday and Friday   (Patient taking differently: Take 4 mg by mouth 2 (two) times a week 4 or 6 mg as directed), Disp: 90 tablet, Rfl: 0    warfarin (COUMADIN) 6 mg tablet, Take 6 mg by mouth The patient takes on Tuesday and Fridays, Disp: , Rfl:     Iron Combinations (NIFEREX) TABS, Take one tablet daily (Patient not taking: Reported on 5/23/2022), Disp: , Rfl: 5    Social History     Socioeconomic History    Marital status: /Civil Union     Spouse name: Not on file    Number of children: Not on file    Years of education: Not on file    Highest education level: Not on file   Occupational History    Not on file   Tobacco Use    Smoking status: Never Smoker    Smokeless tobacco: Never Used   Vaping Use    Vaping Use: Never used   Substance and Sexual Activity    Alcohol use: Not Currently    Drug use: No    Sexual activity: Not on file   Other Topics Concern    Not on file   Social History Narrative    Not on file     Social Determinants of Health     Financial Resource Strain: Not on file   Food Insecurity: Not on file   Transportation Needs: Not on file   Physical Activity: Not on file   Stress: Not on file   Social Connections: Not on file   Intimate Partner Violence: Not on file   Housing Stability: Not on file       Family History   Problem Relation Age of Onset    Arthritis Mother     Stroke Mother     Arthritis Father     Cancer Father     Arthritis Daughter        Physical Exam  Vitals and nursing note reviewed  Constitutional:       General: He is not in acute distress  Appearance: He is obese  HENT:      Head: Normocephalic and atraumatic  Eyes:      Conjunctiva/sclera: Conjunctivae normal    Cardiovascular:      Rate and Rhythm: Normal rate and regular rhythm  Pulses: Intact distal pulses  Heart sounds: Heart sounds are distant  Pulmonary:      Effort: Pulmonary effort is normal       Breath sounds: Decreased breath sounds present  Abdominal:      General: Bowel sounds are normal       Palpations: Abdomen is soft  Musculoskeletal:         General: Normal range of motion  Cervical back: Normal range of motion and neck supple  Comments: Bilateral lower extremity TEDS on   Skin:     General: Skin is warm and dry  Neurological:      Mental Status: He is alert and oriented to person, place, and time  Vitals: Blood pressure 146/94, pulse 68, height 6' 1" (1 854 m), weight (!) 145 kg (320 lb), SpO2 95 %     Wt Readings from Last 3 Encounters:   05/23/22 (!) 145 kg (320 lb)   05/09/22 (!) 149 kg (328 lb 11 3 oz)   04/18/22 (!) 159 kg (350 lb 12 8 oz)         Labs & Results:  Lab Results   Component Value Date    WBC 6 10 05/07/2022    HGB 10 1 (L) 05/07/2022    HCT 32 4 (L) 05/07/2022    MCV 95 05/07/2022     05/07/2022     BNP   Date Value Ref Range Status   05/07/2022 224 (H) 0 - 100 pg/mL Final   03/30/2014 270 (H) 0 - 99 pg/mL Final     Comment:     The above 1 analytes were performed by [id number]     02/25/2014 262 (H) 0 - 100 pg/mL Final     Comment:     The above 1 analytes were performed by [facility] [id number]     02/14/2014 281 (H) 0 - 99 pg/mL Final     Comment:     The above 1 analytes were performed by [id number]       No components found for: CHEM    Results for orders placed during the hospital encounter of 20    Echo complete with contrast if indicated    Narrative  [address]  [address] [facility], [facility]  [phone]    Transthoracic Echocardiogram  2D, M-mode, Doppler, and Color Doppler    Study date:  2020    Patient: Tanvir Becerra  MR number: [id number]  Account number: [de-identified]  : 1935  Age: 81 years  Gender: Male  Status: Outpatient  Location: [address] [place] [address] [facility] [place]  Height: 73 in  Weight: 351 3 lb  BP: 152/ 73 mmHg    Indications: PAF; Acute on chronic diastolic HF;s/p AVR  Diagnoses: I35 9 - Nonrheumatic aortic valve disorder, unspecified, I48 0 - Atrial fibrillation, I50 9 - Heart failure, unspecified    Sonographer:  ABBY Denis  Interpreting Physician:  Queenie Case MD  Primary Physician:  Nallely Villasenor DO  Referring Physician:  Quique Turk MD  Group:  [facility]  Cardiology Fellow:  Tasha Lindsey DO    SUMMARY    PROCEDURE INFORMATION:  This was a technically difficult study  Echocardiographic views were limited due to restricted patient mobility, poor patient compliance, poor acoustic window availability, decreased penetration, and lung interference  LEFT VENTRICLE:  Systolic function was normal  Ejection fraction was estimated to be 55 %  Although no diagnostic regional wall motion abnormality was identified, this possibility cannot be completely excluded on the basis of this study  Wall thickness was mildly to moderately increased  There was mild concentric hypertrophy  RIGHT VENTRICLE:  The ventricle was mildly dilated  Wall thickness was mildly increased  LEFT ATRIUM:  The atrium was mildly to moderately dilated  RIGHT ATRIUM:  The atrium was mildly to moderately dilated  MITRAL VALVE:  There was mild to moderate annular calcification  There was mild regurgitation  AORTIC VALVE:  A bioprosthesis was present  It exhibited normal function    The peak valve velocity was 197 cm/s  The mean valve velocity was 152 cm/s  Valve peak gradient was 16 mmHg  Valve mean gradient was 9 mmHg  Estimated aortic valve area (by VTI) was 2 16 cmï¾²  TRICUSPID VALVE:  There was mild regurgitation  Pulmonary artery systolic pressure was mildly increased  Estimated peak PA pressure was 40 mmHg  PULMONIC VALVE:  There was mild regurgitation  HISTORY: PRIOR HISTORY: HTN;s/p ICD;VT;SOB; Morbid obesity  PROCEDURE: The study was performed in the [address]  This was a routine study  The transthoracic approach was used  The study included complete 2D imaging, M-mode, complete spectral Doppler, and color Doppler  The  heart rate was 67 bpm, at the start of the study  Images were obtained from the parasternal, apical, subcostal, and suprasternal notch acoustic windows  Echocardiographic views were limited due to restricted patient mobility, poor patient  compliance, poor acoustic window availability, decreased penetration, and lung interference  This was a technically difficult study  LEFT VENTRICLE: Size was normal  Systolic function was normal  Ejection fraction was estimated to be 55 %  Although no diagnostic regional wall motion abnormality was identified, this possibility cannot be completely excluded on the basis  of this study  Wall thickness was mildly to moderately increased  There was mild concentric hypertrophy  RIGHT VENTRICLE: The ventricle was mildly dilated  Systolic function was low normal  Wall thickness was mildly increased  A pacing wire was present in the ventricular cavity  LEFT ATRIUM: The atrium was mildly to moderately dilated  RIGHT ATRIUM: The atrium was mildly to moderately dilated  MITRAL VALVE: There was mild to moderate annular calcification  There was normal leaflet separation  DOPPLER: The transmitral velocity was within the normal range  There was no evidence for stenosis   There was mild regurgitation  AORTIC VALVE: A bioprosthesis was present  It exhibited normal function  TRICUSPID VALVE: The valve structure was normal  There was normal leaflet separation  DOPPLER: The transtricuspid velocity was within the normal range  There was no evidence for stenosis  There was mild regurgitation  Pulmonary artery  systolic pressure was mildly increased  Estimated peak PA pressure was 40 mmHg  PULMONIC VALVE: Leaflets exhibited normal thickness, no calcification, and normal cuspal separation  DOPPLER: The transpulmonic velocity was within the normal range  There was mild regurgitation  PERICARDIUM: There was no pericardial effusion  AORTA: The root exhibited normal size  SYSTEMIC VEINS: IVC: The inferior vena cava was not well visualized      MEASUREMENT TABLES    2D MEASUREMENTS  LVOT   (Reference normals)  Diam   23 mm   (--)    DOPPLER MEASUREMENTS  LVOT   (Reference normals)  Peak annie   101 cm/s   (--)  Mean annie   70 cm/s   (--)  VTI   23 cm   (--)  Peak gradient   4 mmHg   (--)  Mean gradient   2 2 mmHg   (--)  Stroke vol   95 56 ml   (--)  Aortic valve   (Reference normals)  Peak annie   197 cm/s   (--)  Mean annie   152 cm/s   (--)  VTI   44 cm   (--)  Peak gradient   16 mmHg   (--)  Mean gradient   9 mmHg   (--)  Obstr index, VTI   0 52    (--)  Valve area, VTI   2 16 cmï¾²   (--)  Area index, VTI   0 79 cmï¾²/mï¾²   (--)  Obstr index, Vmax   0 51    (--)  Valve area, Vmax   2 12 cmï¾²   (--)  Area index, Vmax   0 77 cmï¾²/mï¾²   (--)  Obstr index, Vmean   0 46    (--)  Valve area, Vmean   1 91 cmï¾²   (--)  Area index, Vmean   0 7 cmï¾²/mï¾²   (--)    SYSTEM MEASUREMENT TABLES    2D  %FS: 22 82 %  Ao Diam: 2 49 cm  EDV(Teich): 57 38 ml  EF(Teich): 46 66 %  ESV(Teich): 30 61 ml  IVSd: 1 96 cm  LA Area: 26 79 cm2  LA Diam: 4 97 cm  LVEDV MOD A4C: 112 1 ml  LVEF MOD A4C: 50 26 %  LVESV MOD A4C: 55 76 ml  LVIDd: 3 68 cm  LVIDs: 2 84 cm  LVLd A4C: 8 11 cm  LVLs A4C: 6 99 cm  LVOT Diam: 2 25 cm  LVPWd: 1 98 cm  RA Area: 24 17 cm2  RVIDd: 4 17 cm  SV MOD A4C: 56 34 ml  SV(Teich): 26 78 ml    CW  AV Env  Ti: 296 86 ms  AV VTI: 39 2 cm  AV Vmax: 2 1 m/s  AV Vmean: 1 32 m/s  AV maxP 68 mmHg  AV meanP 71 mmHg  TR Vmax: 2 85 m/s  TR maxP 45 mmHg    MM  TAPSE: 1 69 cm    PW  KISHA (VTI): 2 34 cm2  KISHA Vmax: 1 91 cm2  AVAI (VTI): 0 cm2/m2  AVAI Vmax: 0 cm2/m2  E' Sept: 0 06 m/s  E/E' Sept: 19 55  LVOT Env  Ti: 327 31 ms  LVOT VTI: 22 97 cm  LVOT Vmax: 1 01 m/s  LVOT Vmean: 0 7 m/s  LVOT maxP 04 mmHg  LVOT meanP 24 mmHg  LVSI Dopp: 33 45 ml/m2  LVSV Dopp: 91 66 ml  MV A Juan: 1 43 m/s  MV Dec Luna: 5 21 m/s2  MV DecT: 213 91 ms  MV E Juan: 1 11 m/s  MV E/A Ratio: 0 78  MV PHT: 62 03 ms  MVA By PHT: 3 55 cm2    [employer] Accredited Echocardi[facility]    Prepared and electronically signed by    Daxa Souza MD  Signed [id number] 10:03:59    No results found for this or any previous visit  This note was completed in part utilizing [employer] fluency direct voice recognition software  Grammatical errors, random word insertion, spelling mistakes, and incomplete sentences may be an occasional consequence of the system secondary to software limitations, ambient noise and hardware issues  At the time of dictation, efforts were made to edit, clarify and /or correct errors  Please read the chart carefully and recognize, using context, where substitutions have occurred    If you have any questions or concerns about the context, text or information contained within the body of this dictation, please contact myself, the provider, for further clarification

## 2022-05-24 ENCOUNTER — IN-CLINIC DEVICE VISIT (OUTPATIENT)
Dept: CARDIOLOGY CLINIC | Facility: CLINIC | Age: 87
End: 2022-05-24
Payer: MEDICARE

## 2022-05-24 DIAGNOSIS — Z95.810 AICD (AUTOMATIC CARDIOVERTER/DEFIBRILLATOR) PRESENT: Primary | ICD-10-CM

## 2022-05-24 LAB
BUN SERPL-MCNC: 32 MG/DL (ref 7–25)
BUN/CREAT SERPL: 19 (CALC) (ref 6–22)
CALCIUM SERPL-MCNC: 9.5 MG/DL (ref 8.6–10.3)
CHLORIDE SERPL-SCNC: 102 MMOL/L (ref 98–110)
CO2 SERPL-SCNC: 27 MMOL/L (ref 20–32)
CREAT SERPL-MCNC: 1.65 MG/DL (ref 0.7–1.11)
GLUCOSE SERPL-MCNC: 120 MG/DL (ref 65–99)
POTASSIUM SERPL-SCNC: 4 MMOL/L (ref 3.5–5.3)
SL AMB EGFR AFRICAN AMERICAN: 43 ML/MIN/1.73M2
SL AMB EGFR NON AFRICAN AMERICAN: 37 ML/MIN/1.73M2
SODIUM SERPL-SCNC: 141 MMOL/L (ref 135–146)

## 2022-05-24 PROCEDURE — 93283 PRGRMG EVAL IMPLANTABLE DFB: CPT | Performed by: INTERNAL MEDICINE

## 2022-05-24 NOTE — PROGRESS NOTES
Results for orders placed or performed in visit on 05/24/22   Cardiac EP device report    Narrative    MDT DUAL CHAMBER ICD/ NOT MRI CONDITIONAL  DEVICE INTERROGATED IN THE [facility] OFFICE  BATTERY VOLTAGE ADEQUATE (3 2 YRS)  AP-1%, <0 1%  ALL LEAD PARAMETERS WITHIN NORMAL LIMITS  NO SIGNIFICANT HIGH RATE EPISODES  NORMAL DEVICE FUNCTION   GV

## 2022-05-31 ENCOUNTER — TELEPHONE (OUTPATIENT)
Dept: VASCULAR SURGERY | Facility: CLINIC | Age: 87
End: 2022-05-31

## 2022-05-31 NOTE — TELEPHONE ENCOUNTER
Attempted to contact patient to schedule appointment(s) listed below  Requested patient call [phone] option 3 to schedule appointment(s)  Patient's appointment(s) are past due, expected ASAP      Dopplers  [] Abdominal Aorta Iliac (AOIL)  [] Carotid (CV)   [] Celiac and/or Mesenteric  [] Endovascular Aortic Repair (EVAR)   [] Hemodialysis Access (HD)   [] Lower Limb Arterial (KARSTEN)  [] Lower Limb Venous Duplex with Reflux (LEVDR)  [] Renal Artery  [] Upper Limb (UEA)    Advanced Imaging   [] CTA abdomen    [] CTA abdomen & pelvis    [] CT abdomen with/ without contrast  [] CT abdomen with contrast  [] CT abdomen without contrast    [] CT abdomen & pelvis with/ without contrast  [] CT abdomen & pelvis with contrast  [] CT abdomen & pelvis without contrast    Office Visit   [] New patient, patient last seen over 3 years ago  [] Risk factor modification (RFM)   [x] Follow up

## 2022-06-03 ENCOUNTER — PATIENT OUTREACH (OUTPATIENT)
Dept: CASE MANAGEMENT | Facility: OTHER | Age: 87
End: 2022-06-03

## 2022-06-03 NOTE — PROGRESS NOTES
Patient reports he is doing well  He remains asymptomatic, no shortness of breath or leg swelling  He knows how to manage heart failure  He has scheduled a follow up with his PCP on 6/9  He has no needs at this time

## 2022-06-15 NOTE — PROGRESS NOTES
Heart Failure Outpatient Consult Note - Joan Pinedo 81 y o  male MRN: [id number]    @ Encounter: [id number]      Assessment/Plan:    Patient Active Problem List    Diagnosis Date Noted    Chronic heart failure with preserved ejection fraction (HFpEF) ([place] [address] ) 05/22/2022    Stage 3a chronic kidney disease with elevated creatinine 05/07/2022    Secondary lymphedema 09/24/2018    Ventricular tachycardia ([place] [address] ) 08/30/2018    Chronic venous insufficiency 08/21/2018    Venous ulcer of ankle, right ([place] [address] ) 07/11/2018    Generalized edema 04/05/2018    PAF (paroxysmal atrial fibrillation) ([place] [address] ) 03/22/2018    S/P AVR 03/22/2018    Essential hypertension 03/22/2018    DVT (deep venous thrombosis) ([place] [address] ) 03/22/2018    Presence of implantable cardioverter-defibrillator (ICD) 03/22/2018    Morbid obesity ([place] [address] ) 04/04/2017    Type 2 diabetes mellitus with renal complication ([place] [address] ) 99/87/7727    Hydrocele 04/04/2017    Chronic anticoagulation 04/04/2017    Anemia 04/04/2017    Epididymitis 04/02/2017    Cellulitis of scrotum 04/02/2017    Facial cellulitis 12/26/2016    Dental abscess 12/26/2016     Chronic HFpEF with PH, RV dysfunction  Has elevated PAP on TTE though performed in decompensated state  Likely has some level of RV dysfunction given obesity, likely untreated OBINNA, left sided heart dsz  Torsemide recently decreased to  20 mg daily by PCP for unknown reasons    BP is controlled  Will increase Torsemide back to BID dosing with 20 meq of Kdur BID  Patient orthopneic with  weight gain, worsening TATE, elevated JVP  Will check in on progress next week  Weight:  320-->330 lbs  TTE 5/8/22 (patient in decompensated state)  EF 60%  Wall motion is normal   RV cavity size moderately dilated, with moderately reduced systolic function  Mild biatrial enlargement  Bioprosthetic aortic valve present  Mild-to-moderate MR  Moderate TR  Est PASP 70 mmHg  Mild pulmonic regurgitation, PASP 70 mm Hg    PASP severely increased, IVC dilated, no pericardial effusion    TTE11/17/2020:  EF 55% with no obvious wall motion abnormalities but this cannot be excluded on the basis of this study, mild concentric LVH, mildly dilated RV, mild-moderate SID, bioprosthetic aortic valve with normal function, mild MR, mild TR  PAF  H/O DCCV  Maintained on AARD with sotalol 80 mg b i d  anticoagulation with warfarin monitored by his PCP  Maintaining NSR  History endocarditis; S/P bio AVR 2014  Normal functioning valve on echocardiogram 5/22  History ventricular tachycardia  In setting of structural heart disease     On sotalol 80 mg q 12, Status post MDT DC ICD 2014  --Interrogation 03/29/2022: (NOT MRI conditional)   AP 0 4%,  0 1% no significant high rate episodes, normal device function  Chronic left bundle branch block  Hypertension, essential     Hyperlipidemia  On atorvastatin 40 mg/d  Type 2 diabetes mellitus  Hemoglobin A1c 6 2 On metformin  History DVT maintained on warfarin, per his PCP  5/7/22:  INR 2 4  CKD 3  Baseline creatinine 1 4-1 6  Stable  Morbid obesity, BMI 43  Possible OBINNA  Will send for PSG to screen given risk factors  No results found for: NTBNP       HPI:   Eugnee Gomes is an 27-year-old gentleman who has a history of endocarditis, and is S/P Bio AVR, chronicHFpEF, ventricular tachycardia, S/P ICD, PAF on sotalol, with chronic anticoagulation, essential hypertension, prior DVT  He follows with Dr Michelle Jensen and was last seen in the office January 2022  NOted: Underwent R TKR 11/21   Does get some dyspnea on exertion   No chest pain, palpitations   Some RLE edema     Home medication regimen includes atorvastatin 40 mg daily, candesartan-HCTZ 16-12 5 mg daily, sotalol 80 mg BID, torsemide 20 mg BID, Coumadin      Adm 5/7-5/9/22  CC: Worsening SOB and lower extremity edema  His torsemide was stopped a month prior  20 lb over his dry weight    Normally on torsemide 20 mg b i d --->advised by his pharmacist to discontinue torsemide a month prior as he was already on BP medication with HCTZ  EKG showed normal sinus rhythm, first-degree AV block, left bundle branch block   Echocardiogram:  EF 55%  Mild LVH  Normal function bioprosthetic AVR  Lower extremity duplex:  No DVT  CTA: No PE  Mild heart failure  Dx: Acute on chronic heart failure preserved ejection fraction secondary to inadequate diuretics  Discharge weight :328 lb down from 352  OV wt 330 1/22  Discharge creatinine:1 57  Discharge diuretics: Torsemide 20 mg b i d  ---not to be on HCTZ or chlorthalidone        5/23/22  Hospital follow-up-->Acute diastolic heart failure in the setting of being off his loop diuretics  ROS: productive cough for gray /green phlegm, 3-4 times a week-Mostly its clear  Positive TATE, however is improved  No chest pain  He regularly monitor his is blood pressures  Systolics are in the 091W routinely    6/20/22  Patient presents for follow up and initial visit to our clinic  Recently admitted in May with acute heart failure decompensation  Doing well about a month ago  SOB was improved  Tells me his PCP took him off BID dosing of his Torsemide and now with weight gain, worsening TATE         Past Medical History:   Diagnosis Date    Anemia     Arthritis     Atrial fibrillation ([address] )     Basal cell carcinoma 03/22/2022    Tip of Nose    CHF (congestive heart failure) (HCC)     Diabetes mellitus ([address] )     Niddm    DVT (deep vein thrombosis) in pregnancy 1966    not in pregnancy    DVT (deep venous thrombosis) ([address] ) 1966    Dyslipidemia     GERD (gastroesophageal reflux disease)     Hyperlipidemia     Hypertension     Irregular heart beat     Afib    Morbid obesity due to excess calories ([address] )     Resolved 9/2/2014     Pulmonary embolism (HCC)     Sepsis [facility]     Squamous cell skin cancer 07/30/2020    Left posterior scalp    Visual impairment        12 point ROS negative other than that stated in HPI    Allergies   Allergen Reactions    Tramadol Other (See Comments)     intolerance           Current Outpatient Medications:     acetaminophen (TYLENOL) 325 mg tablet, Take 2 tablets by mouth every 6 (six) hours as needed for mild pain or fever, Disp: 30 tablet, Rfl: 0    albuterol (PROVENTIL HFA,VENTOLIN HFA) 90 mcg/act inhaler, INHALE 2 PUFFS BY MOUTH EVERY 6 HOURS AS NEEDED FOR WHEEZING OR SHORTNESS OF BREATH, Disp: , Rfl:     amoxicillin (AMOXIL) 500 mg capsule, TK 4 CS BEFORE 2 HOURS BEFORE DENTAL OR FACIAL SURGERY WORK, Disp: , Rfl: 3    atorvastatin (LIPITOR) 40 mg tablet, Take 40 mg by mouth daily after dinner Atorvastatin Calcium 40 MG Oral Tablet Take 1 tablet daily  Refills: 0  Active , Disp: , Rfl:     B Complex-C (SUPER B COMPLEX PO), Take 1 capsule by mouth daily , Disp: , Rfl:     cholecalciferol (VITAMIN D3) 1,000 units tablet, Take 2,000 Units by mouth daily, Disp: , Rfl:     fluticasone (FLONASE) 50 mcg/act nasal spray, 2 sprays as needed Shake liquid and spray , Disp: , Rfl:     gabapentin (NEURONTIN) 100 mg capsule, , Disp: , Rfl:     HYDROcodone-acetaminophen (NORCO) 5-325 mg per tablet, Take 1 tablet by mouth every 6 (six) hours as needed  , Disp: , Rfl:     Iron Combinations (NIFEREX) TABS, Take one tablet daily (Patient not taking: Reported on 5/23/2022), Disp: , Rfl: 5    metFORMIN (GLUCOPHAGE) 1000 MG tablet, Take 1,000 mg by mouth daily after dinner MetFORMIN HCl 1000 MG (MOD) TB24 Take one tablet daily  Refills: 0  Active , Disp: , Rfl:     Multiple Vitamin (MULTIVITAMINS PO), Take 1 tablet by mouth daily, Disp: , Rfl:     mupirocin (BACTROBAN) 2 % ointment, as needed , Disp: , Rfl:     omeprazole (PriLOSEC) 20 mg delayed release capsule, Omeprazole 20 MG Oral Tablet Delayed Release Take 1 tablet daily  Refills: 0  Active, Disp: , Rfl:     polyethylene glycol (GLYCOLAX) 17 GM/SCOOP powder, Take 17 g by mouth 2 (two) times a day, Disp: , Rfl:     potassium chloride (K-DUR,KLOR-CON) 20 mEq tablet, Take 20 mEq by mouth daily after dinner Potassium Chloride Jesusita ER 20 MEQ Oral Tablet Extended Release Take 1 tablet daily  Refills: 0  Active , Disp: , Rfl:     sotalol (BETAPACE) 80 mg tablet, Take 80 mg by mouth every 12 (twelve) hours , Disp: , Rfl: 2    torsemide (DEMADEX) 20 mg tablet, Take 1 tablet (20 mg total) by mouth 2 (two) times a day, Disp: 180 tablet, Rfl: 0    warfarin (COUMADIN) 4 mg tablet, Take 1 tablet (4 mg total) by mouth 2 (two) times a week Patient take 4mg everyday but Tuesday and Friday   (Patient taking differently: Take 4 mg by mouth 2 (two) times a week 4 or 6 mg as directed), Disp: 90 tablet, Rfl: 0    warfarin (COUMADIN) 6 mg tablet, Take 6 mg by mouth The patient takes on Tuesday and Fridays, Disp: , Rfl:     Social History     Socioeconomic History    Marital status: /Civil Union     Spouse name: Not on file    Number of children: Not on file    Years of education: Not on file    Highest education level: Not on file   Occupational History    Not on file   Tobacco Use    Smoking status: Never Smoker    Smokeless tobacco: Never Used   Vaping Use    Vaping Use: Never used   Substance and Sexual Activity    Alcohol use: Not Currently    Drug use: No    Sexual activity: Not on file   Other Topics Concern    Not on file   Social History Narrative    Not on file     Social Determinants of Health     Financial Resource Strain: Not on file   Food Insecurity: Not on file   Transportation Needs: Not on file   Physical Activity: Not on file   Stress: Not on file   Social Connections: Not on file   Intimate Partner Violence: Not on file   Housing Stability: Not on file       Family History   Problem Relation Age of Onset    Arthritis Mother     Stroke Mother     Arthritis Father     Cancer Father     Arthritis Daughter        Physical Exam:    Vitals:/70 (BP Location: Left arm, Patient Position: Sitting, Cuff Size: Standard) Pulse 74   Ht 6' 1" (1 854 m)   Wt (!) 150 kg (330 lb 3 2 oz)   SpO2 99%   BMI 43 56 kg/m²     Wt Readings from Last 3 Encounters:   05/23/22 (!) 145 kg (320 lb)   05/09/22 (!) 149 kg (328 lb 11 3 oz)   04/18/22 (!) 159 kg (350 lb 12 8 oz)       Physical Exam:    GEN: [place] appears well, alert and oriented x 3, pleasant and cooperative   HEENT: pupils equal, round, and reactive to light; extraocular muscles intact  NECK: supple, no carotid bruits   HEART: regular rhythm, normal S1 and S2, no murmurs, clicks, gallops or rubs, JVP is  up  LUNGS: clear to auscultation bilaterally; no wheezes, rales, or rhonchi   ABDOMEN: normal bowel sounds, soft, no tenderness, no distention  EXTREMITIES: peripheral pulses normal; no clubbing, cyanosis, + BLLE edema, R>L  NEURO: no focal findings   SKIN: normal without suspicious lesions on exposed skin    Labs & Results:    Lab Results   Component Value Date    WBC 6 10 05/07/2022    HGB 10 1 (L) 05/07/2022    HCT 32 4 (L) 05/07/2022    MCV 95 05/07/2022     05/07/2022     Lab Results   Component Value Date    SODIUM 141 05/24/2022    K 4 0 05/24/2022     05/24/2022    CO2 27 05/24/2022    BUN 32 (H) 05/24/2022    CREATININE 1 65 (H) 05/24/2022    GLUC 120 (H) 05/24/2022    CALCIUM 9 5 05/24/2022     No results found for: NTBNP   No results found for: CHOLESTEROL  No results found for: HDL  No results found for: TRIG  No results found for: [place]    EKG personally reviewed by ODESSA Naik  No results found for this visit on 06/20/22  Counseling / Coordination of Care  Total floor / unit time spent today 40 minutes  Greater than 50% of total time was spent with the patient and / or family counseling and / or coordination of care  A description of the counseling / coordination of care: 25 min  Thank you for the opportunity to participate in the care of this patient  Elvira Mahmood

## 2022-06-16 ENCOUNTER — TELEPHONE (OUTPATIENT)
Dept: WOUND CARE | Facility: HOSPITAL | Age: 87
End: 2022-06-16

## 2022-06-17 ENCOUNTER — PATIENT OUTREACH (OUTPATIENT)
Dept: CASE MANAGEMENT | Facility: OTHER | Age: 87
End: 2022-06-17

## 2022-06-20 ENCOUNTER — OFFICE VISIT (OUTPATIENT)
Dept: CARDIOLOGY CLINIC | Facility: CLINIC | Age: 87
End: 2022-06-20
Payer: MEDICARE

## 2022-06-20 VITALS
HEART RATE: 74 BPM | HEIGHT: 73 IN | DIASTOLIC BLOOD PRESSURE: 70 MMHG | SYSTOLIC BLOOD PRESSURE: 124 MMHG | OXYGEN SATURATION: 99 % | BODY MASS INDEX: 41.75 KG/M2 | WEIGHT: 315 LBS

## 2022-06-20 DIAGNOSIS — L97.319 VENOUS ULCER OF ANKLE, RIGHT (HCC): ICD-10-CM

## 2022-06-20 DIAGNOSIS — G47.33 OSA (OBSTRUCTIVE SLEEP APNEA): Primary | ICD-10-CM

## 2022-06-20 DIAGNOSIS — I50.30 DIASTOLIC CONGESTIVE HEART FAILURE, UNSPECIFIED HF CHRONICITY (HCC): ICD-10-CM

## 2022-06-20 DIAGNOSIS — R06.02 SOB (SHORTNESS OF BREATH): ICD-10-CM

## 2022-06-20 DIAGNOSIS — I83.013 VENOUS ULCER OF ANKLE, RIGHT (HCC): ICD-10-CM

## 2022-06-20 PROCEDURE — 99215 OFFICE O/P EST HI 40 MIN: CPT | Performed by: NURSE PRACTITIONER

## 2022-06-20 RX ORDER — TORSEMIDE 20 MG/1
20 TABLET ORAL 2 TIMES DAILY
Qty: 180 TABLET | Refills: 0 | Status: SHIPPED | OUTPATIENT
Start: 2022-06-20 | End: 2022-07-18

## 2022-06-20 NOTE — PATIENT INSTRUCTIONS
Weight yourself daily  If you gain 3 lbs in one day or 5 lbs in one week, please call the office at [phone] and ask for a nurse or the heart failure nurse  Keep your sodium intake to <2 grams, (2000 mg) per day, and fluids <2 Liters (2000 ml) per day  This is around 6-7, 8 oz glasses of fluid per day    Try a melatonin supplement to help you sleep  Increase Torsemide back to 20 mg twice daily  Increase Potassium to 20 meq twice daily  I will check in with you in one week to see how you are progressing  Schedule sleep study

## 2022-06-21 ENCOUNTER — OFFICE VISIT (OUTPATIENT)
Dept: VASCULAR SURGERY | Facility: CLINIC | Age: 87
End: 2022-06-21
Payer: MEDICARE

## 2022-06-21 VITALS
DIASTOLIC BLOOD PRESSURE: 80 MMHG | BODY MASS INDEX: 41.75 KG/M2 | SYSTOLIC BLOOD PRESSURE: 132 MMHG | HEIGHT: 73 IN | WEIGHT: 315 LBS | HEART RATE: 68 BPM

## 2022-06-21 DIAGNOSIS — I87.2 CHRONIC VENOUS INSUFFICIENCY: Primary | ICD-10-CM

## 2022-06-21 PROCEDURE — 99213 OFFICE O/P EST LOW 20 MIN: CPT | Performed by: NURSE PRACTITIONER

## 2022-06-21 NOTE — PROGRESS NOTES
Assessment/Plan:    Chronic venous insufficiency  81-year-old male with HTN, DM, paroxysmal AFib, chronic HF, s/p AVR '14, pHTN,chronic venous insufficiency, secondary lymphedema, history of right lower extremity DVT/PE/IVCF, chronic anticoagulation, history of right GSV stripping with recurrent varicosities and RLE ulceration s/p injection sclero of  vein by Dr Candy Arana '18, s/p R TKR November '21  Presents to the office for evaluation right distal anterior lower leg ulceration   -Patient admitted 5/7- 5/9 for acute CHF exacerbation  -Developed weeping from right distal anterior lower leg and ulceration which has since healed with local wound care  -continue with compression   -Resume pneumatic compression pumps   -moisturize skin daily to maintain skin integrity   -No vascular intervention   -Follow up PRN        Diagnoses and all orders for this visit:    Chronic venous insufficiency          Subjective:      Patient ID: Vvii Meier is a 81 y o  male  Pt is here to eval RL wound  Pt says wound on RL has healed and denies drainage or pain  Pt is taking Warfarin and Atorvastatin  HPI  81-year-old male with HTN, DM, paroxysmal AFib, chronic HF, s/p AVR '14, pHTN,chronic venous insufficiency, secondary lymphedema, history of right lower extremity DVT/PE/IVCF, chronic anticoagulation, history of right GSV stripping with recurrent varicosities and RLE ulceration s/p injection sclero of  vein by Dr Candy Arana '18  Presents to the office for evaluation right distal anterior lower leg ulceration  Previously seen by Dr Candy Arana 7/23/21  Patient admitted last month with acute on chronic heart failure  Was diuresed  About 2-3 weeks ago noticed weeping from distal anterior right lower leg  Area has since healed with local wound care  He stopped using pneumatic compression pumps with weeping wound  He is compliant with prescription heavy grade compression socks  He has no open ulceration presently  No signs of cellulitis  Diuretics were adjusted by cardiology recently  The following portions of the patient's history were reviewed and updated as appropriate: allergies, current medications, past family history, past medical history, past social history, past surgical history and problem list   ROS reviewed     Review of Systems   Constitutional: Negative  HENT: Negative  Eyes: Negative  Respiratory: Negative  Cardiovascular: Negative  Gastrointestinal: Negative  Endocrine: Negative  Genitourinary: Negative  Musculoskeletal: Negative  Skin: Negative  Allergic/Immunologic: Negative  Neurological: Negative  Hematological: Negative  Psychiatric/Behavioral: Negative  Objective:  I have reviewed and made appropriate changes to the review of systems input by the medical assistant      Vitals:    06/21/22 1550   BP: 132/80   BP Location: Right arm   Patient Position: Sitting   Cuff Size: Large   Pulse: 68   Weight: (!) 169 kg (373 lb 9 6 oz)   Height: 6' 1" (1 854 m)       Patient Active Problem List   Diagnosis    Facial cellulitis    Dental abscess    Epididymitis    Cellulitis of scrotum    Morbid obesity ([address] )    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)    Hydrocele    Chronic anticoagulation    Anemia    [facility]) [facility])    S/P AVR    Essential hypertension    DVT (deep venous thrombosis) ([facility])    Presence of implantable cardioverter-defibrillator (ICD)    Generalized edema    Venous ulcer of ankle, right ([facility])    Chronic venous insufficiency    Ventricular tachycardia ([facility])    Secondary lymphedema    Stage 3a chronic kidney disease with elevated creatinine    Chronic heart failure with preserved ejection fraction (HFpEF) ([address] )       Past Surgical History:   Procedure Laterality Date    AORTIC VALVE REPLACEMENT      CARDIAC DEFIBRILLATOR PLACEMENT  04/2014   Guanako Iqbal CARDIAC SURGERY  02/2014    AVR    COLONOSCOPY      INSERT / REPLACE / REMOVE PACEMAKER      JOINT REPLACEMENT Left     LTKR    MOHS SURGERY  07/30/2020    Left posterior scalp, Dr Camejo Bis  04/18/2022    [address] [place] of [address]- Dr Paramjit Miller SEP-FEM JUNC Right 8/17/2018    Procedure: LEG PERFORATED INJECTION SCLEROTHERAPY;  Surgeon: Mulu Gonzalez MD;  [facility]: [facility];  Service: Vascular    REPLACEMENT TOTAL KNEE Right     TOTAL KNEE ARTHROPLASTY Left     VASCULAR SURGERY      VENA CAVA FILTER PLACEMENT      Interruption inferior vena cava, [place] filter, placement    WISDOM TOOTH EXTRACTION         Family History   Problem Relation Age of Onset    Arthritis Mother     Stroke Mother     Arthritis Father     Cancer Father     Arthritis Daughter        Social History     Socioeconomic History    Marital status: /Civil Union     Spouse name: Not on file    Number of children: Not on file    Years of education: Not on file    Highest education level: Not on file   Occupational History    Not on file   Tobacco Use    Smoking status: Never Smoker    Smokeless tobacco: Never Used   Vaping Use    Vaping Use: Never used   Substance and Sexual Activity    Alcohol use: Not Currently    Drug use: No    Sexual activity: Not on file   Other Topics Concern    Not on file   Social History Narrative    Not on file     Social Determinants of Health     Financial Resource Strain: Not on file   Food Insecurity: Not on file   Transportation Needs: Not on file   Physical Activity: Not on file   Stress: Not on file   Social Connections: Not on file   Intimate Partner Violence: Not on file   Housing Stability: Not on file       Allergies   Allergen Reactions    Tramadol Other (See Comments)     intolerance         Current Outpatient Medications:     acetaminophen (TYLENOL) 325 mg tablet, Take 2 tablets by mouth every 6 (six) hours as needed for mild pain or fever, Disp: 30 tablet, Rfl: 0    atorvastatin (LIPITOR) 40 mg tablet, Take 40 mg by mouth daily after dinner Atorvastatin Calcium 40 MG Oral Tablet Take 1 tablet daily  Refills: 0  Active , Disp: , Rfl:     B Complex-C (SUPER B COMPLEX PO), Take 1 capsule by mouth daily , Disp: , Rfl:     cholecalciferol (VITAMIN D3) 1,000 units tablet, Take 2,000 Units by mouth daily, Disp: , Rfl:     fluticasone (FLONASE) 50 mcg/act nasal spray, 2 sprays as needed Shake liquid and spray , Disp: , Rfl:     gabapentin (NEURONTIN) 100 mg capsule, Take 100 mg by mouth daily, Disp: , Rfl:     Iron Combinations (NIFEREX) TABS, Take 1 tablet by mouth in the morning, Disp: , Rfl: 5    metFORMIN (GLUCOPHAGE) 1000 MG tablet, Take 1,000 mg by mouth daily after dinner MetFORMIN HCl 1000 MG (MOD) TB24 Take one tablet daily  Refills: 0  Active , Disp: , Rfl:     Multiple Vitamin (MULTIVITAMINS PO), Take 1 tablet by mouth daily, Disp: , Rfl:     mupirocin (BACTROBAN) 2 % ointment, as needed , Disp: , Rfl:     omeprazole (PriLOSEC) 20 mg delayed release capsule, Omeprazole 20 MG Oral Tablet Delayed Release Take 1 tablet daily  Refills: 0  Active, Disp: , Rfl:     polyethylene glycol (GLYCOLAX) 17 GM/SCOOP powder, Take 17 g by mouth 2 (two) times a day, Disp: , Rfl:     potassium chloride (K-DUR,KLOR-CON) 20 mEq tablet, Take 20 mEq by mouth 2 (two) times a day Potassium Chloride Jesusita ER 20 MEQ Oral Tablet Extended Release Take 1 tablet daily  Refills: 0  Active , Disp: , Rfl:     sotalol (BETAPACE) 80 mg tablet, Take 80 mg by mouth every 12 (twelve) hours , Disp: , Rfl: 2    torsemide (DEMADEX) 20 mg tablet, Take 1 tablet (20 mg total) by mouth 2 (two) times a day, Disp: 180 tablet, Rfl: 0    warfarin (COUMADIN) 4 mg tablet, Take 1 tablet (4 mg total) by mouth 2 (two) times a week Patient take 4mg everyday but Tuesday and Friday   (Patient taking differently: Take 4 mg by mouth 2 (two) times a week 4 or 6 mg as directed), Disp: 90 tablet, Rfl: 0   warfarin (COUMADIN) 6 mg tablet, Take 6 mg by mouth The patient takes on Tuesday and Fridays, Disp: , Rfl:     albuterol (PROVENTIL HFA,VENTOLIN HFA) 90 mcg/act inhaler, INHALE 2 PUFFS BY MOUTH EVERY 6 HOURS AS NEEDED FOR WHEEZING OR SHORTNESS OF BREATH (Patient not taking: Reported on 6/21/2022), Disp: , Rfl:     amoxicillin (AMOXIL) 500 mg capsule, As needed (Patient not taking: Reported on 6/21/2022), Disp: , Rfl: 3    HYDROcodone-acetaminophen (NORCO) 5-325 mg per tablet, Take 1 tablet by mouth every 6 (six) hours as needed   (Patient not taking: Reported on 6/21/2022), Disp: , Rfl:       /80 (BP Location: Right arm, Patient Position: Sitting, Cuff Size: Large)   Pulse 68   Ht 6' 1" (1 854 m)   Wt (!) 169 kg (373 lb 9 6 oz)   BMI 49 29 kg/m²          Physical Exam  Vitals and nursing note reviewed  Exam conducted with a chaperone present  Constitutional:       Appearance: Normal appearance  He is obese  HENT:      Head: Normocephalic and atraumatic  Eyes:      Extraocular Movements: Extraocular movements intact  Pulmonary:      Effort: Pulmonary effort is normal    Abdominal:      General: Bowel sounds are normal    Musculoskeletal:      Comments: Chronic venous stasis changes of the right lower extremity with medial ankle prior skin grafting  Advanced lymphedema with lichenification and papilomatosis at the medial proximal calf  Right anterior distal lower leg ulceration has completely healed  No weeping or cellulitis   Neurological:      Mental Status: He is alert

## 2022-06-21 NOTE — ASSESSMENT & PLAN NOTE
67-year-old male with HTN, DM, paroxysmal AFib, chronic HF, s/p AVR '14, pHTN,chronic venous insufficiency, secondary lymphedema, history of right lower extremity DVT/PE/IVCF, chronic anticoagulation, history of right GSV stripping with recurrent varicosities and RLE ulceration s/p injection sclero of  vein by Dr Ortega Mcdowell '18, s/p R TKR November '21   Presents to the office for evaluation right distal anterior lower leg ulceration   -Patient admitted 5/7- 5/9 for acute CHF exacerbation  -Developed weeping from right distal anterior lower leg and ulceration which has since healed with local wound care  -continue with compression   -Resume pneumatic compression pumps   -moisturize skin daily to maintain skin integrity   -No vascular intervention   -Follow up PRN

## 2022-06-22 ENCOUNTER — TELEPHONE (OUTPATIENT)
Dept: SLEEP CENTER | Facility: CLINIC | Age: 87
End: 2022-06-22

## 2022-06-22 NOTE — TELEPHONE ENCOUNTER
----- Message from Tawnya Mathew MD sent at 6/21/2022  4:23 PM EDT -----  Approved    ----- Message -----  From: Abigail Grubbs  Sent: 6/21/2022   8:55 AM EDT  To: Sleep Medicine Ranjith Provider    This Diagnostic sleep study needs approval      If approved please sign and return to clerical pool  If denied please include reasons why  Also provide alternative testing if warranted  Please sign and return to clerical pool

## 2022-07-01 ENCOUNTER — PATIENT OUTREACH (OUTPATIENT)
Dept: CASE MANAGEMENT | Facility: OTHER | Age: 87
End: 2022-07-01

## 2022-07-01 NOTE — PROGRESS NOTES
I spoke with patient who reports he has dyspnea on exertion relied by rest  He was seen by his cardiologist on 6/20 and his Torsemide was increased to 20 mg twice daily  He denies chest pain or weight gain at this time  He reports he is following a low sodium diet  He stated he has not missed any of his doses of Torsemide  I advised him to call the cardiology if shortness of breath does not improve or he gains 2-3 pounds overnight or 5 pounds in one week  Next appointment 7/18 with cardiology 
none/eyeglasses

## 2022-07-11 NOTE — PROGRESS NOTES
Heart Failure Outpatient Progress Note - Nan Claros 81 y o  male MRN: [id number]    @ Encounter: [id number]      Assessment/Plan:    Patient Active Problem List    Diagnosis Date Noted    Chronic heart failure with preserved ejection fraction (HFpEF) ([place] [address] ) 05/22/2022    Stage 3a chronic kidney disease with elevated creatinine 05/07/2022    Secondary lymphedema 09/24/2018    Ventricular tachycardia ([place] [address] ) 08/30/2018    Chronic venous insufficiency 08/21/2018    Venous ulcer of ankle, right ([place] [address] ) 07/11/2018    Generalized edema 04/05/2018    PAF (paroxysmal atrial fibrillation) ([place] [address] ) 03/22/2018    S/P AVR 03/22/2018    Essential hypertension 03/22/2018    DVT (deep venous thrombosis) ([place] [address] ) 03/22/2018    Presence of implantable cardioverter-defibrillator (ICD) 03/22/2018    Morbid obesity ([place] [address] ) 04/04/2017    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) 09/51/2612    Hydrocele 04/04/2017    Chronic anticoagulation 04/04/2017    Anemia 04/04/2017    Epididymitis 04/02/2017    Cellulitis of scrotum 04/02/2017    Facial cellulitis 12/26/2016    Dental abscess 12/26/2016     Chronic HFpEF with PH, RV dysfunction  Has elevated PAP on TTE though performed in decompensated state  Likely has some level of RV dysfunction given obesity, likely untreated OBINNA, left sided heart dsz  Restarted on BID dosing of Torsemide 20 mg at last visit however still with elevated JVP, weight gain, abdominal distension  Suspect some of this is dietary/fluid intake related  Will increase Torsemide again today to 60 mg daily with Kdur 20 meq BID  Check labs  Does not want to go for sleep study but in agreement to home study         Weight:  320-->330 lbs, today 334 lbs  TTE 5/8/22 (patient in decompensated state)  EF 60%  Wall motion is normal   RV cavity size moderately dilated, with moderately reduced systolic function  Mild biatrial enlargement  Bioprosthetic aortic valve present  Mild-to-moderate MR  Moderate TR  Est PASP 70 mmHg  Mild pulmonic regurgitation, PASP 70 mm Hg  PASP severely increased, IVC dilated, no pericardial effusion    TTE11/17/2020:  EF 55% with no obvious wall motion abnormalities but this cannot be excluded on the basis of this study, mild concentric LVH, mildly dilated RV, mild-moderate SID, bioprosthetic aortic valve with normal function, mild MR, mild TR  PAF  H/O DCCV  Maintained on AARD with sotalol 80 mg b i d  anticoagulation with warfarin monitored by his PCP  Maintaining NSR  History endocarditis; S/P bio AVR 2014  Normal functioning valve on echocardiogram 5/22  History ventricular tachycardia  In setting of structural heart disease     On sotalol 80 mg q 12, Status post MDT DC ICD 2014  --Interrogation 03/29/2022: (NOT MRI conditional)   AP 0 4%,  0 1% no significant high rate episodes, normal device function  Chronic left bundle branch block  Hypertension, essential     Hyperlipidemia  On atorvastatin 40 mg/d  Type 2 diabetes mellitus  Hemoglobin A1c 6 2 On metformin  History DVT maintained on warfarin, per his PCP  5/7/22:  INR 2 4  CKD 3  Baseline creatinine 1 4-1 6  Stable  Repeat BMP  Morbid obesity, BMI 43  Possible OBINNA  Will send for home study, possible PSG to screen given risk factors  No results found for: NTBNP       HPI:   Temo Alston is an 52-year-old gentleman who has a history of endocarditis, and is S/P Bio AVR, chronicHFpEF, ventricular tachycardia, S/P ICD, PAF on sotalol, with chronic anticoagulation, essential hypertension, prior DVT  He follows with Dr aKmila Wilder and was last seen in the office January 2022  NOted: Underwent R TKR 11/21   Does get some dyspnea on exertion   No chest pain, palpitations   Some RLE edema     Home medication regimen includes atorvastatin 40 mg daily, candesartan-HCTZ 16-12 5 mg daily, sotalol 80 mg BID, torsemide 20 mg BID, Coumadin      Adm 5/7-5/9/22  CC:   Worsening SOB and lower extremity edema  His torsemide was stopped a month prior  20 lb over his dry weight  Normally on torsemide 20 mg b i d --->advised by his pharmacist to discontinue torsemide a month prior as he was already on BP medication with HCTZ  EKG showed normal sinus rhythm, first-degree AV block, left bundle branch block   Echocardiogram:  EF 55%  Mild LVH  Normal function bioprosthetic AVR  Lower extremity duplex:  No DVT  CTA: No PE  Mild heart failure  Dx: Acute on chronic heart failure preserved ejection fraction secondary to inadequate diuretics  Discharge weight :328 lb down from 352  OV wt 330 1/22  Discharge creatinine:1 57  Discharge diuretics: Torsemide 20 mg b i d  ---not to be on HCTZ or chlorthalidone        5/23/22  Hospital follow-up-->Acute diastolic heart failure in the setting of being off his loop diuretics  ROS: productive cough for gray /green phlegm, 3-4 times a week-Mostly its clear  Positive TATE, however is improved  No chest pain  He regularly monitor his is blood pressures  Systolics are in the 957F routinely    6/20/22  Patient presents for follow up and initial visit to our clinic  Recently admitted in May with acute heart failure decompensation  Doing well about a month ago  SOB was improved  Tells me his PCP took him off BID dosing of his Torsemide and now with weight gain, worsening TATE      7/18/22  Presents for follow up  At last visit, increased his Torsemide back to BID due to progressively worsening volume overload  He continues with significant TATE despite this change  His weight is up  Feeling bloated  Admits to excessive thirst and probably drinking more than he should  Diet contains a good amount of sodium         Past Medical History:   Diagnosis Date    Anemia     Arthritis     Atrial fibrillation ([facility] )     Basal cell carcinoma 03/22/2022    Tip of Nose    CHF (congestive heart failure) (HCC)     Diabetes mellitus ([facility] )     Niddm    DVT (deep vein thrombosis) in pregnancy 1966    not in pregnancy  DVT (deep venous thrombosis) ([facility] ) 1966    Dyslipidemia     GERD (gastroesophageal reflux disease)     Hyperlipidemia     Hypertension     Irregular heart beat     Afib    Morbid obesity due to excess calories ([facility] )     Resolved 9/2/2014     Pulmonary embolism (HCC)     Sepsis ([facility] )     Squamous cell skin cancer 07/30/2020    Left posterior scalp    Visual impairment        12 point ROS negative other than that stated in HPI    Allergies   Allergen Reactions    Tramadol Other (See Comments)     intolerance           Current Outpatient Medications:     acetaminophen (TYLENOL) 325 mg tablet, Take 2 tablets by mouth every 6 (six) hours as needed for mild pain or fever, Disp: 30 tablet, Rfl: 0    albuterol (PROVENTIL HFA,VENTOLIN HFA) 90 mcg/act inhaler, INHALE 2 PUFFS BY MOUTH EVERY 6 HOURS AS NEEDED FOR WHEEZING OR SHORTNESS OF BREATH (Patient not taking: Reported on 6/21/2022), Disp: , Rfl:     amoxicillin (AMOXIL) 500 mg capsule, As needed (Patient not taking: Reported on 6/21/2022), Disp: , Rfl: 3    atorvastatin (LIPITOR) 40 mg tablet, Take 40 mg by mouth daily after dinner Atorvastatin Calcium 40 MG Oral Tablet Take 1 tablet daily  Refills: 0  Active , Disp: , Rfl:     B Complex-C (SUPER B COMPLEX PO), Take 1 capsule by mouth daily , Disp: , Rfl:     cholecalciferol (VITAMIN D3) 1,000 units tablet, Take 2,000 Units by mouth daily, Disp: , Rfl:     fluticasone (FLONASE) 50 mcg/act nasal spray, 2 sprays as needed Shake liquid and spray , Disp: , Rfl:     gabapentin (NEURONTIN) 100 mg capsule, Take 100 mg by mouth daily, Disp: , Rfl:     HYDROcodone-acetaminophen (NORCO) 5-325 mg per tablet, Take 1 tablet by mouth every 6 (six) hours as needed   (Patient not taking: Reported on 6/21/2022), Disp: , Rfl:     Iron Combinations (NIFEREX) TABS, Take 1 tablet by mouth in the morning, Disp: , Rfl: 5    metFORMIN (GLUCOPHAGE) 1000 MG tablet, Take 1,000 mg by mouth daily after dinner MetFORMIN HCl 1000 MG (MOD) TB24 Take one tablet daily  Refills: 0  Active , Disp: , Rfl:     Multiple Vitamin (MULTIVITAMINS PO), Take 1 tablet by mouth daily, Disp: , Rfl:     mupirocin (BACTROBAN) 2 % ointment, as needed , Disp: , Rfl:     omeprazole (PriLOSEC) 20 mg delayed release capsule, Omeprazole 20 MG Oral Tablet Delayed Release Take 1 tablet daily  Refills: 0  Active, Disp: , Rfl:     polyethylene glycol (GLYCOLAX) 17 GM/SCOOP powder, Take 17 g by mouth 2 (two) times a day, Disp: , Rfl:     potassium chloride (K-DUR,KLOR-CON) 20 mEq tablet, Take 20 mEq by mouth 2 (two) times a day Potassium Chloride Jesusita ER 20 MEQ Oral Tablet Extended Release Take 1 tablet daily  Refills: 0  Active , Disp: , Rfl:     sotalol (BETAPACE) 80 mg tablet, Take 80 mg by mouth every 12 (twelve) hours , Disp: , Rfl: 2    torsemide (DEMADEX) 20 mg tablet, Take 1 tablet (20 mg total) by mouth 2 (two) times a day, Disp: 180 tablet, Rfl: 0    warfarin (COUMADIN) 4 mg tablet, Take 1 tablet (4 mg total) by mouth 2 (two) times a week Patient take 4mg everyday but Tuesday and Friday   (Patient taking differently: Take 4 mg by mouth 2 (two) times a week 4 or 6 mg as directed), Disp: 90 tablet, Rfl: 0    warfarin (COUMADIN) 6 mg tablet, Take 6 mg by mouth The patient takes on Tuesday and Fridays, Disp: , Rfl:     Social History     Socioeconomic History    Marital status: /Civil Union     Spouse name: Not on file    Number of children: Not on file    Years of education: Not on file    Highest education level: Not on file   Occupational History    Not on file   Tobacco Use    Smoking status: Never Smoker    Smokeless tobacco: Never Used   Vaping Use    Vaping Use: Never used   Substance and Sexual Activity    Alcohol use: Not Currently    Drug use: No    Sexual activity: Not on file   Other Topics Concern    Not on file   Social History Narrative    Not on file     Social Determinants of Health     Financial Resource Strain: Not on file   Food Insecurity: Not on file   Transportation Needs: Not on file   Physical Activity: Not on file   Stress: Not on file   Social Connections: Not on file   Intimate Partner Violence: Not on file   Housing Stability: Not on file       Family History   Problem Relation Age of Onset    Arthritis Mother     Stroke Mother     Arthritis Father     Cancer Father     Arthritis Daughter        Physical Exam:    Vitals:/74 (BP Location: Right arm, Patient Position: Sitting, Cuff Size: Standard)   Pulse 69   Ht 6' 1" (1 854 m)   Wt (!) 152 kg (334 lb 6 4 oz)   SpO2 98%   BMI 44 12 kg/m²       Wt Readings from Last 3 Encounters:   06/21/22 (!) 169 kg (373 lb 9 6 oz)   06/20/22 (!) 150 kg (330 lb 3 2 oz)   05/23/22 (!) 145 kg (320 lb)       Physical Exam:    GEN: Leah Hunger appears well, alert and oriented x 3, pleasant and cooperative   HEENT: pupils equal, round, and reactive to light; extraocular muscles intact  NECK: supple, no carotid bruits   HEART: regular rhythm, normal S1 and S2, no murmurs, clicks, gallops or rubs, JVP is  up  LUNGS: clear to auscultation bilaterally; no wheezes, rales, or rhonchi   ABDOMEN: normal bowel sounds, soft, no tenderness, no distention  EXTREMITIES: peripheral pulses normal; no clubbing, cyanosis, +1 BLLE edema, R>L  NEURO: no focal findings   SKIN: normal without suspicious lesions on exposed skin    Labs & Results:    Lab Results   Component Value Date    WBC 6 10 05/07/2022    HGB 10 1 (L) 05/07/2022    HCT 32 4 (L) 05/07/2022    MCV 95 05/07/2022     05/07/2022     Lab Results   Component Value Date    SODIUM 141 05/24/2022    K 4 0 05/24/2022     05/24/2022    CO2 27 05/24/2022    BUN 32 (H) 05/24/2022    CREATININE 1 65 (H) 05/24/2022    GLUC 120 (H) 05/24/2022    CALCIUM 9 5 05/24/2022     No results found for: NTBNP   No results found for: CHOLESTEROL  No results found for: HDL  No results found for: TRIG  No results found for: Tam    EKG personally reviewed by ODESSA Cohen  No results found for this visit on 07/18/22  Counseling / Coordination of Care  Total floor / unit time spent today 40 minutes  Greater than 50% of total time was spent with the patient and / or family counseling and / or coordination of care  A description of the counseling / coordination of care: 25 min  Thank you for the opportunity to participate in the care of this patient  Elvira Santillan

## 2022-07-18 ENCOUNTER — OFFICE VISIT (OUTPATIENT)
Dept: CARDIOLOGY CLINIC | Facility: CLINIC | Age: 87
End: 2022-07-18
Payer: MEDICARE

## 2022-07-18 VITALS
OXYGEN SATURATION: 98 % | HEART RATE: 69 BPM | WEIGHT: 315 LBS | DIASTOLIC BLOOD PRESSURE: 74 MMHG | HEIGHT: 73 IN | SYSTOLIC BLOOD PRESSURE: 138 MMHG | BODY MASS INDEX: 41.75 KG/M2

## 2022-07-18 DIAGNOSIS — I50.30 DIASTOLIC CONGESTIVE HEART FAILURE, UNSPECIFIED HF CHRONICITY (HCC): ICD-10-CM

## 2022-07-18 DIAGNOSIS — R06.02 SOB (SHORTNESS OF BREATH): ICD-10-CM

## 2022-07-18 DIAGNOSIS — G47.33 OSA (OBSTRUCTIVE SLEEP APNEA): Primary | ICD-10-CM

## 2022-07-18 PROCEDURE — 99215 OFFICE O/P EST HI 40 MIN: CPT | Performed by: NURSE PRACTITIONER

## 2022-07-18 RX ORDER — COLCHICINE 0.6 MG/1
0.6 TABLET ORAL AS NEEDED
COMMUNITY

## 2022-07-18 RX ORDER — TORSEMIDE 20 MG/1
60 TABLET ORAL DAILY
Qty: 180 TABLET | Refills: 2 | Status: SHIPPED | OUTPATIENT
Start: 2022-07-18 | End: 2022-07-18

## 2022-07-18 RX ORDER — TORSEMIDE 20 MG/1
TABLET ORAL
Qty: 270 TABLET | Refills: 3 | Status: SHIPPED | OUTPATIENT
Start: 2022-07-18

## 2022-07-18 NOTE — PATIENT INSTRUCTIONS
Weight yourself daily  If you gain 3 lbs in one day or 5 lbs in one week, please call the office at [phone] and ask for a nurse or the heart failure nurse  Keep your sodium intake to <2 grams, (2000 mg) per day, and fluids <2 Liters (2000 ml) per day  This is around 6-7, 8 oz glasses of fluid per day    Increase Torsemide to 60 mg daily  You can take 40 mg in the morning and 20 mg in the afternoon  Continue potassium 20 meq twice daily  Get lab work to check kidney function and electrolytes ASAP  Schedule home sleep study

## 2022-07-19 ENCOUNTER — APPOINTMENT (OUTPATIENT)
Dept: LAB | Age: 87
End: 2022-07-19
Payer: MEDICARE

## 2022-07-19 DIAGNOSIS — I50.32 CHRONIC HEART FAILURE WITH PRESERVED EJECTION FRACTION (HFPEF) (HCC): ICD-10-CM

## 2022-07-19 DIAGNOSIS — N18.31 STAGE 3A CHRONIC KIDNEY DISEASE (HCC): ICD-10-CM

## 2022-07-19 DIAGNOSIS — G47.33 OSA (OBSTRUCTIVE SLEEP APNEA): ICD-10-CM

## 2022-07-19 DIAGNOSIS — I50.9 CHF (CONGESTIVE HEART FAILURE) (HCC): ICD-10-CM

## 2022-07-19 LAB
ANION GAP SERPL CALCULATED.3IONS-SCNC: 8 MMOL/L (ref 4–13)
BUN SERPL-MCNC: 32 MG/DL (ref 5–25)
CALCIUM SERPL-MCNC: 9.4 MG/DL (ref 8.3–10.1)
CHLORIDE SERPL-SCNC: 106 MMOL/L (ref 96–108)
CO2 SERPL-SCNC: 26 MMOL/L (ref 21–32)
CREAT SERPL-MCNC: 1.93 MG/DL (ref 0.6–1.3)
GFR SERPL CREATININE-BSD FRML MDRD: 30 ML/MIN/1.73SQ M
GLUCOSE P FAST SERPL-MCNC: 154 MG/DL (ref 65–99)
MAGNESIUM SERPL-MCNC: 2.2 MG/DL (ref 1.6–2.6)
NT-PROBNP SERPL-MCNC: 2258 PG/ML
POTASSIUM SERPL-SCNC: 4 MMOL/L (ref 3.5–5.3)
SODIUM SERPL-SCNC: 140 MMOL/L (ref 135–147)

## 2022-07-19 PROCEDURE — 36415 COLL VENOUS BLD VENIPUNCTURE: CPT | Performed by: NURSE PRACTITIONER

## 2022-07-19 PROCEDURE — 83880 ASSAY OF NATRIURETIC PEPTIDE: CPT

## 2022-07-19 PROCEDURE — 83735 ASSAY OF MAGNESIUM: CPT | Performed by: NURSE PRACTITIONER

## 2022-07-19 PROCEDURE — 80048 BASIC METABOLIC PNL TOTAL CA: CPT | Performed by: NURSE PRACTITIONER

## 2022-07-21 ENCOUNTER — TELEPHONE (OUTPATIENT)
Dept: CARDIOLOGY CLINIC | Facility: CLINIC | Age: 87
End: 2022-07-21

## 2022-07-21 ENCOUNTER — TELEPHONE (OUTPATIENT)
Dept: SLEEP CENTER | Facility: CLINIC | Age: 87
End: 2022-07-21

## 2022-07-21 ENCOUNTER — HOSPITAL ENCOUNTER (OUTPATIENT)
Dept: SLEEP CENTER | Facility: CLINIC | Age: 87
Discharge: HOME | End: 2022-07-21
Payer: MEDICARE

## 2022-07-21 DIAGNOSIS — G47.33 OSA (OBSTRUCTIVE SLEEP APNEA): ICD-10-CM

## 2022-07-21 PROCEDURE — G0399 HOME SLEEP TEST/TYPE 3 PORTA: HCPCS

## 2022-07-21 NOTE — TELEPHONE ENCOUNTER
----- Message from Nena Chin MD sent at 7/19/2022  1:53 PM EDT -----  Fozia Vital      ----- Message -----  From: Antonio Castro  Sent: 7/19/2022   9:24 AM EDT  To: Nena Chin MD    This Home sleep study needs approval      If approved please sign and return to clerical pool  If denied please include reasons why  Also provide alternative testing if warranted  Please sign and return to clerical pool

## 2022-07-22 ENCOUNTER — TELEPHONE (OUTPATIENT)
Dept: CARDIOLOGY CLINIC | Facility: CLINIC | Age: 87
End: 2022-07-22

## 2022-07-22 NOTE — PROGRESS NOTES
Home Sleep Study Documentation    HOME STUDY DEVICE: Noxturnal yes                                           Amanda G3 no      Pre-Sleep Home Study:    Set-up and instructions performed by: GENARO Zhou     Technician performed demonstration for Patient: yes    Return demonstration performed by Patient: yes    Written instructions provided to Patient: yes    Patient signed consent form: yes        Post-Sleep Home Study:    Additional comments by Patient: None    Home Sleep Study Failed:no:    Failure reason: N/A    Reported or Detected: N/A    Scored by: KIERSTEN Fierro

## 2022-07-22 NOTE — TELEPHONE ENCOUNTER
Susana Monahan, [facility] Card[facility] Ranjith Clinical  Please call patient and let him know that his home study showed evidence of sleep apnea  We recommend a formal sleep study at this time  Will place order if he is in agreement  Thanks         I called and spoke to patient, advised of results, he stated he is not ready at this time  Pt stated he wanted to take care of his breathing issues first  I told him I would put a note in his chart and when he is ready to give us a call

## 2022-07-25 ENCOUNTER — TELEPHONE (OUTPATIENT)
Dept: SLEEP CENTER | Facility: CLINIC | Age: 87
End: 2022-07-25

## 2022-07-25 ENCOUNTER — TELEPHONE (OUTPATIENT)
Dept: CARDIOLOGY CLINIC | Facility: CLINIC | Age: 87
End: 2022-07-25

## 2022-07-25 NOTE — TELEPHONE ENCOUNTER
----- Message from Ken Stuart sent at 7/20/2022  9:28 AM EDT -----  Hi,     Can you please follow up with patient next week and check to see how he is doing on the higher dose of Torsemide? His creat is up but I suspect this is from congestion  If he is not feeling any better in a week, may need to escalate dosing again       Thanks  Sweta Alcaraz

## 2022-07-25 NOTE — TELEPHONE ENCOUNTER
Ann Xiong is off this week as is Dr Sherman   Please let me know if you think patient should increase diuretic more  Continues w/ SOB w/ exertion  Unable to walk w/o resting  States it is a "shade worse" since seeing Ann Xiong  Works as a  at [employer] and has trouble w/ working his full day  LE edema has improved  Voiding good amount of urine-- needs to know where bathrooms are     Weight yesterday 328 lbs-- did not weigh himself today  Weight 7/18 334 4 lbs    Currently taking Torsemide 40 mg am and 20 mg pm w/ Potassium 20 meq BID     Made some changes w/ diet-- now avoiding canned veggies but still eating frozen burgers, steak sandwiches  Trying to stay within 2L fluids daily but tends to be thirty so likely drinking more     O/v w/ Dr Sherman  8/23    Calling central scheduling to have formal sleep study done   Home study showed mod to severe OBINNA

## 2022-07-25 NOTE — TELEPHONE ENCOUNTER
S/w Lisset Linares advised-- verbally understood  Discussed checking daily weights and contact office at [phone] if he gains 3 lbs in one day or 5 lbs in 5-7 days  Read labels  Limit daily sodium intake to 2000 mg daily  Limit daily fluid intake to 2000 mL (about 60 ounces) daily

## 2022-07-25 NOTE — TELEPHONE ENCOUNTER
Sleep study shows moderate to severe OBINNA scheduled the patient for first available consult with Dr Surendra Trevino in [facility]

## 2022-07-27 ENCOUNTER — PATIENT OUTREACH (OUTPATIENT)
Dept: CASE MANAGEMENT | Facility: OTHER | Age: 87
End: 2022-07-27

## 2022-07-28 ENCOUNTER — PATIENT OUTREACH (OUTPATIENT)
Dept: CASE MANAGEMENT | Facility: OTHER | Age: 87
End: 2022-07-28

## 2022-07-28 NOTE — PROGRESS NOTES
I spoke with patient who reports he saw his PCP this morning who changed his medications  Un fortunately the office note is not complete when I opened it  He feels good he reports  He stated he is less short of breath and note less leg swelling  I advised him I will be closing this episode next week but I am always available to assist with his healthcare needs  He has my contact information

## 2022-08-02 ENCOUNTER — OFFICE VISIT (OUTPATIENT)
Dept: SLEEP CENTER | Facility: HOSPITAL | Age: 87
End: 2022-08-02
Payer: MEDICARE

## 2022-08-02 VITALS
DIASTOLIC BLOOD PRESSURE: 78 MMHG | WEIGHT: 315 LBS | HEIGHT: 73 IN | SYSTOLIC BLOOD PRESSURE: 130 MMHG | BODY MASS INDEX: 41.75 KG/M2

## 2022-08-02 DIAGNOSIS — E11.29 TYPE 2 DIABETES MELLITUS WITH MICROALBUMINURIA, WITHOUT LONG-TERM CURRENT USE OF INSULIN (HCC): ICD-10-CM

## 2022-08-02 DIAGNOSIS — I82.591 CHRONIC DEEP VEIN THROMBOSIS (DVT) OF OTHER VEIN OF RIGHT LOWER EXTREMITY (HCC): ICD-10-CM

## 2022-08-02 DIAGNOSIS — E66.01 MORBID OBESITY (HCC): ICD-10-CM

## 2022-08-02 DIAGNOSIS — I50.32 CHRONIC HEART FAILURE WITH PRESERVED EJECTION FRACTION (HFPEF) (HCC): ICD-10-CM

## 2022-08-02 DIAGNOSIS — I10 ESSENTIAL HYPERTENSION: ICD-10-CM

## 2022-08-02 DIAGNOSIS — I47.20 VENTRICULAR TACHYCARDIA: ICD-10-CM

## 2022-08-02 DIAGNOSIS — R80.9 TYPE 2 DIABETES MELLITUS WITH MICROALBUMINURIA, WITHOUT LONG-TERM CURRENT USE OF INSULIN (HCC): ICD-10-CM

## 2022-08-02 DIAGNOSIS — I48.0 PAF (PAROXYSMAL ATRIAL FIBRILLATION) (HCC): ICD-10-CM

## 2022-08-02 DIAGNOSIS — G47.33 OSA (OBSTRUCTIVE SLEEP APNEA): Primary | ICD-10-CM

## 2022-08-02 DIAGNOSIS — I89.0 SECONDARY LYMPHEDEMA: ICD-10-CM

## 2022-08-02 PROCEDURE — 99204 OFFICE O/P NEW MOD 45 MIN: CPT | Performed by: INTERNAL MEDICINE

## 2022-08-02 NOTE — PATIENT INSTRUCTIONS
What is OBINNA? Obstructive sleep apnea is a common and serious sleep disorder that causes you to stop breathing during sleep  The airway repeatedly becomes blocked, limiting the amount of air that reaches your lungs  When this happens, you may snore loudly or making choking noises as you try to breathe  Your brain and body becomes oxygen deprived and you may wake up  This may happen a few times a night, or in more severe cases, several hundred times a night  Sleep apnea can make you wake up in the morning feeling tired or unrefreshed even though you have had a full night of sleep  During the day, you may feel fatigued, have difficulty concentrating or you may even unintentionally fall asleep  This is because your body is waking up numerous times throughout the night, even though you might not be conscious of each awakening  The lack of oxygen your body receives can have negative long-term consequences for your health  This includes:  High blood pressure  Heart disease  Irregular heart rhythms  Stroke  Pre-diabetes and diabetes  Depression    Testing  An objective evaluation of your sleep may be needed before your board certified sleep physician can make a diagnosis  Options include:   In-lab overnight sleep study  This type of sleep study requires you to stay overnight at a sleep center, in a bed that may resemble a hotel room  You will sleep with sensors hooked up to various parts of your body  These sensors record your brain waves, heartbeat, breathing and movement  An overnight sleep study also provides your doctor with the most complete information about your sleep  Learn more about an overnight sleep study  Home sleep apnea test  Some patients with high risk factors for obstructive sleep apnea and no other medical disorders may be candidates for a home sleep apnea test  The testing equipment differs in that it is less complicated than what is used in an overnight sleep study   As such, does not give all the data an in-lab will and if negative, may not mean you do not have the problem  Treatment for sleep apnea  includes using a continuous positive airway pressure (CPAP) machine to keep your airway open during sleep  A mask is placed over your nose and mouth, or just your nose  The mask is hooked to the CPAP machine that blows a gentle stream of air into the mask when you breathe  This helps keep your airway open so you can breathe more regularly  Extra oxygen may be given to you through the machine  You may be given a mouth device  It looks like a mouth guard or dental retainer and stops your tongue and mouth tissues from blocking your throat while you sleep  Surgery may be needed to remove extra tissues that block your mouth, throat, or nose  Manage sleep apnea:   Do not smoke  Nicotine and other chemicals in cigarettes and cigars can cause lung damage  Ask your healthcare provider for information if you currently smoke and need help to quit  E-cigarettes or smokeless tobacco still contain nicotine  Talk to your healthcare provider before you use these products  Do not drink alcohol or take sedative medicine before you go to sleep  Alcohol and sedatives can relax the muscles and tissues around your throat  This can block the airflow to your lungs  Maintain a healthy weight  Excess tissue around your throat may restrict your breathing  Ask your healthcare provider for information if you need to lose weight  Sleep on your side or use pillows designed to prevent sleep apnea  This prevents your tongue or other tissues from blocking your throat  You can also raise the head of your bed  Driving Safety  Refrain from driving when drowsy  Follow up with your healthcare provider as directed:  Write down your questions so you remember to ask them during your visits  Go to AASM website for more information: Sleepeducation  org     What is OBINNA?    Obstructive sleep apnea is a common and serious sleep disorder that causes you to stop breathing during sleep  The airway repeatedly becomes blocked, limiting the amount of air that reaches your lungs  When this happens, you may snore loudly or making choking noises as you try to breathe  Your brain and body becomes oxygen deprived and you may wake up  This may happen a few times a night, or in more severe cases, several hundred times a night  Sleep apnea can make you wake up in the morning feeling tired or unrefreshed even though you have had a full night of sleep  During the day, you may feel fatigued, have difficulty concentrating or you may even unintentionally fall asleep  This is because your body is waking up numerous times throughout the night, even though you might not be conscious of each awakening  The lack of oxygen your body receives can have negative long-term consequences for your health  This includes:  High blood pressure  Heart disease  Irregular heart rhythms  Stroke  Pre-diabetes and diabetes  Depression    Testing  An objective evaluation of your sleep may be needed before your board certified sleep physician can make a diagnosis  Options include:   In-lab overnight sleep study  This type of sleep study requires you to stay overnight at a sleep center, in a bed that may resemble a hotel room  You will sleep with sensors hooked up to various parts of your body  These sensors record your brain waves, heartbeat, breathing and movement  An overnight sleep study also provides your doctor with the most complete information about your sleep  Learn more about an overnight sleep study  Home sleep apnea test  Some patients with high risk factors for obstructive sleep apnea and no other medical disorders may be candidates for a home sleep apnea test  The testing equipment differs in that it is less complicated than what is used in an overnight sleep study   As such, does not give all the data an in-lab will and if negative, may not mean you do not have the problem  Treatment for sleep apnea  includes using a continuous positive airway pressure (CPAP) machine to keep your airway open during sleep  A mask is placed over your nose and mouth, or just your nose  The mask is hooked to the CPAP machine that blows a gentle stream of air into the mask when you breathe  This helps keep your airway open so you can breathe more regularly  Extra oxygen may be given to you through the machine  You may be given a mouth device  It looks like a mouth guard or dental retainer and stops your tongue and mouth tissues from blocking your throat while you sleep  Surgery may be needed to remove extra tissues that block your mouth, throat, or nose  Manage sleep apnea:   Do not smoke  Nicotine and other chemicals in cigarettes and cigars can cause lung damage  Ask your healthcare provider for information if you currently smoke and need help to quit  E-cigarettes or smokeless tobacco still contain nicotine  Talk to your healthcare provider before you use these products  Do not drink alcohol or take sedative medicine before you go to sleep  Alcohol and sedatives can relax the muscles and tissues around your throat  This can block the airflow to your lungs  Maintain a healthy weight  Excess tissue around your throat may restrict your breathing  Ask your healthcare provider for information if you need to lose weight  Sleep on your side or use pillows designed to prevent sleep apnea  This prevents your tongue or other tissues from blocking your throat  You can also raise the head of your bed  Driving Safety  Refrain from driving when drowsy  Follow up with your healthcare provider as directed:  Write down your questions so you remember to ask them during your visits  Go to AAS website for more information: Sleepeducation  org       Nursing Support:  When: Monday through Friday 7A-5PM except holidays  Where: Our direct line is [phone]      If you are having a true emergency please call 911  In the event that the line is busy or it is after hours please leave a voice message and we will return your call  Please speak clearly, leaving your full name, birth date, best number to reach you and the reason for your call  Medication refills: We will need the name of the medication, the dosage, the ordering provider, whether you get a 30 or 90 day refill, and the pharmacy name and address  Medications will be ordered by the provider only  Nurses cannot call in prescriptions  Please allow 7 days for medication refills  Physician requested updates: If your provider requested that you call with an update after starting medication, please be ready to provide us the medication and dosage, what time you take your medication, the time you attempt to fall asleep, time you fall asleep, when you wake up, and what time you get out of bed  Sleep Study Results: We will contact you with sleep study results and/or next steps after the physician has reviewed your testing

## 2022-08-02 NOTE — PROGRESS NOTES
Review of Systems      Genitourinary need to urinate more than twice a night and difficulty with erection   Cardiology ankle/leg swelling   Gastrointestinal none   Neurology muscle weakness and numbness/tingling of an extremity   Constitutional fatigue and weight change   Integumentary none   Psychiatry none   Musculoskeletal joint pain, muscle aches and back pain   Pulmonary frequent cough   ENT throat clearing   Endocrine none   Hematological none 23-Aug-2019 02:12

## 2022-08-02 NOTE — PROGRESS NOTES
Consultation - [facility]  81 y o  male  VRV:7/62/9913  NVT:[id number]  DOS:8/2/2022    Physician Requesting Consult: Monie Groves DO             Reason for Consult : At your kind request I saw Sandie Prince for initial sleep evaluation today  Home sleep testing was undertaken to evaluate for sleep disordered breathing and patient is here to review results and further options  The study demonstrated :  respiratory event index (ILDA) of 25 6/hr  The lowest SpO2 recorded is 69 0% and 11 5% of the study spent with saturations below 90%  The snore index was 0 8%    PFSH, Problem List, Medications & Allergies were reviewed in EMR  Prince thayer  has a past medical history of Anemia, Arthritis, Atrial fibrillation ([facility] ), Basal cell carcinoma (03/22/2022), CHF (congestive heart failure) ([facility] ), Diabetes mellitus ([facility] ), DVT (deep vein thrombosis) in pregnancy (1966), DVT (deep venous thrombosis) ([facility] ) (1966), Dyslipidemia, GERD (gastroesophageal reflux disease), Hyperlipidemia, Hypertension, Irregular heart beat, Morbid obesity due to excess calories ([facility] ), Pulmonary embolism ([facility] ), Sepsis ([facility] ), Squamous cell skin cancer (07/30/2020), and Visual impairment  He has a current medication list which includes the following prescription(s): acetaminophen, albuterol, atorvastatin, b complex-c, colchicine, fluticasone, gabapentin, hydrocodone-acetaminophen, niferex, metformin, multiple vitamin, mupirocin, omeprazole, polyethylene glycol, potassium chloride, sotalol, torsemide, warfarin, warfarin, amoxicillin, and cholecalciferol  HPI:  He has study was undertaken because of newly diagnosed CHF, comorbidities and his other risk factors  He also had an episode of sleep difficulties which he feels has resolved  Wife has not noted snoring or breathing difficulties during sleep  Other Complaints: none   Restless Leg Syndrome: reports no suggestive symptoms but reports aching in lower extremities and radicular symptoms  Parasomnia: no features reported    Sleep Routine (on average):   Typical Bedtime:  10:30 p m  Gets OOB:  6:00 a m  TIB:7 5 hrs  Sleep latency:< 15 minutes Sleep Interruptions:3-4/nite because of nocturia and able to fall back asleep  Awakens: spontaneously  Upon awakening: usually feels refreshed   Recardo Pluck denies Excessive Daytime Sleepiness and very rarely dozes off when sedentary at home   He rated himself at Total score: 2 /24 on the [place] Sleepiness Scale  Habits:  reports that he has never smoked  He has never used smokeless tobacco  ;   E-Cigarette/Vaping    E-Cigarette Use Never User     ;  reports no history of drug use ;  reports previous alcohol use  ; Caffeine use:very little ; Exercise routine: "not enough"   Family History: Negative for sleep disturbance  ROS - reviewed and as attached  Significant for approximately 20 lb weight loss since he is being diuresed  He has frequent postnasal drip and cough  He has chronic swelling of lower extremities  He had increased shortness of breath that has improved somewhat since he is on treatment for CHF  He reported no other cardiac or respiratory symptoms  He has musculoskeletal aches and pains  Zoey Tony EXAM:  /78 (BP Location: Left arm, Patient Position: Sitting, Cuff Size: Large)   Ht 6' 1" (1 854 m)   Wt (!) 144 kg (317 lb)   BMI 41 82 kg/m²    General: Well groomed male, well appearing, in no apparent distress  Neurological: Alert and orientated;  cooperative; Cranial nerves intact;    Psychiatric: Speech:clear and coherent;  Normal mood, affect & thought   Skin: warm and dry; Color& Hydration good; no facial rashes or lesions   HEENT:  Craniofacial anatomy: obvious overjet Sinuses: non- tender   TMJ: Normal    Eyes: EOM's intact;  conjunctiva/corneas clear   Ears: Externallyappear normal     Nasal Airway: is patent Septum:intact; Mucosa: normal; Turbinates: normal; Rhinorrhea: None   Mouth: Lips: normal posture; Dentition: worn down  Mucosa:moist  ; Hard Palate:normal    Oropharryx: crowded and AP narrowing Tongue: Mallampati:Class IV, Mobile and MacroglossiaSoft Palate:  redundant  Tonsils: absent  Neck:is thick and excess fatty tissue; Neck Circumference: 19 "; Supple; no abnormal masses; Thyroid:normal  Trachea:central     Lymph: No Cervical or Submandibular Lymhadenopathy  Heart: S1,S2 normal; RRR; no gallop; 3/6 pansystolic murmur   Lungs: Respiratory Effort:normal  Air entry good bilaterally  No wheezes  No rales  Abdomen: Obese, Soft & non-tender    Extremities: + pedal edema - wearing compression stockings  No clubbing or cyanosis  Musculoskeletal:  Motor normal; Gait:  Ambulant with a cane  Last CMP in June of this year demonstrated finding consistent with chronic renal failure and diabetes  But CO2 was normal at 26 millimoles per L    IMPRESSION: Primary/Secondary Sleep Diagnoses (to Medical or Psych conditions) & Comorbidities   1  OBINNA (obstructive sleep apnea)  CPAP Study   2  Chronic heart failure with preserved ejection fraction (HFpEF) ([facility])  CPAP Study   3  Essential hypertension     4  PAF (paroxysmal atrial fibrillation) ([facility] )     5  Ventricular tachycardia ([facility] )     6  Chronic deep vein thrombosis (DVT) of other vein of right lower extremity ([facility])     7  Morbid obesity ([facility] )     8  Type 2 diabetes mellitus with microalbuminuria, without long-term current use of insulin ([facility])     9  Secondary lymphedema          PLAN:   1  I reviewed results of the Sleep study with the patient  2  With respect to above conditions, I counseled on pathophysiology, diagnosis, treatment options, risks and benefits; inter-relationship and effects on symptoms and comorbidities; risks of no treatment; costs and insurance aspects  3  Patient elected positive airway pressure therapy and is to be scheduled for a titration study  4  I also advised on weight reduction    5  Follow-up to be scheduled after the study to review results and to initiate therapy  Sincerely,      Authenticated electronically on 43/26/17   Board Certified Specialist     Portions of the record may have been created with voice recognition software  Occasional wrong word or "sound a like" substitutions may have occurred due to the inherent limitations of voice recognition software  There may also be notations and random deletions of words or characters from malfunctioning software  Read the chart carefully and recognize, using context, where substitutions/deletions have occurred

## 2022-08-08 ENCOUNTER — PATIENT OUTREACH (OUTPATIENT)
Dept: CASE MANAGEMENT | Facility: OTHER | Age: 87
End: 2022-08-08

## 2022-08-09 ENCOUNTER — TELEMEDICINE (OUTPATIENT)
Dept: UROLOGY | Facility: AMBULATORY SURGERY CENTER | Age: 87
End: 2022-08-09
Payer: MEDICARE

## 2022-08-09 DIAGNOSIS — N40.1 BENIGN PROSTATIC HYPERPLASIA WITH LOWER URINARY TRACT SYMPTOMS, SYMPTOM DETAILS UNSPECIFIED: Primary | ICD-10-CM

## 2022-08-09 PROCEDURE — 99442 PR PHYS/QHP TELEPHONE EVALUATION 11-20 MIN: CPT | Performed by: NURSE PRACTITIONER

## 2022-08-09 NOTE — ASSESSMENT & PLAN NOTE
Patient is doing well without any urinary complaints  Reviewed the results of his recent BMP, creatinine continues to rise  Recommend patient follow up with PCP and consider referral to nephrology  He denies any episodes of gross hematuria

## 2022-08-09 NOTE — PROGRESS NOTES
Virtual Brief Visit    Patient is located in the following state in which I hold an active license [place]      Assessment/Plan:    Problem List Items Addressed This Visit        Genitourinary    Benign prostatic hyperplasia with lower urinary tract symptoms - Primary     Patient is doing well without any urinary complaints  Reviewed the results of his recent BMP, creatinine continues to rise  Recommend patient follow up with PCP and consider referral to nephrology  He denies any episodes of gross hematuria  Patient will follow up with our office on as needed basis  All questions answered, patient amenable with plan and call with any issues  History of Present Illness  81 y o  male with a history of BPH, microhematuria, ED presents today for follow up  Patient last seen in the office in August 2021  He denies any lower urinary tract symptoms, and is no longer on Flomax  Patient had episode of gross hematuria over 3 years ago  He denies any recurrences  He remains on coumadin for history of aortic valve replacement  Patient continues to follow under Cardiology  He feels he is otherwise emptying his bladder to completion  No additional changes to his overall health  Recent Visits  No visits were found meeting these conditions  Showing recent visits within past 7 days and meeting all other requirements  Today's Visits  Date Type Provider Dept   08/09/22 [id number]   Showing today's visits and meeting all other requirements  Future Appointments  No visits were found meeting these conditions    Showing future appointments within next 150 days and meeting all other requirements         I spent 15 minutes with patient today in which greater than 50% of the time was spent in counseling/coordination of care regarding plan of care

## 2022-08-23 ENCOUNTER — OFFICE VISIT (OUTPATIENT)
Dept: CARDIOLOGY CLINIC | Facility: CLINIC | Age: 87
End: 2022-08-23
Payer: MEDICARE

## 2022-08-23 VITALS
SYSTOLIC BLOOD PRESSURE: 132 MMHG | HEIGHT: 73 IN | WEIGHT: 314.2 LBS | HEART RATE: 64 BPM | BODY MASS INDEX: 41.64 KG/M2 | DIASTOLIC BLOOD PRESSURE: 74 MMHG

## 2022-08-23 DIAGNOSIS — I48.0 PAF (PAROXYSMAL ATRIAL FIBRILLATION) (HCC): Primary | ICD-10-CM

## 2022-08-23 DIAGNOSIS — Z95.2 S/P AVR: ICD-10-CM

## 2022-08-23 DIAGNOSIS — I50.32 CHRONIC HEART FAILURE WITH PRESERVED EJECTION FRACTION (HFPEF) (HCC): ICD-10-CM

## 2022-08-23 DIAGNOSIS — Z95.810 PRESENCE OF IMPLANTABLE CARDIOVERTER-DEFIBRILLATOR (ICD): ICD-10-CM

## 2022-08-23 DIAGNOSIS — I10 ESSENTIAL HYPERTENSION: ICD-10-CM

## 2022-08-23 PROCEDURE — 99214 OFFICE O/P EST MOD 30 MIN: CPT | Performed by: INTERNAL MEDICINE

## 2022-08-23 PROCEDURE — 93000 ELECTROCARDIOGRAM COMPLETE: CPT | Performed by: INTERNAL MEDICINE

## 2022-08-23 NOTE — PATIENT INSTRUCTIONS
Recommendations:  1  Continue current medications, including current dosing of diuretics  2  Check basic metabolic profile in approx 2 weeks  3  Continue remainder of medications  4  Follow up in 2 months

## 2022-08-23 NOTE — PROGRESS NOTES
Cardiology   Scarlett Ceja 81 y o  male MRN: [id number]          Impression:  1  s/p aortic valve replacement for endocarditis - doing well  Echo 2/28 Nml LV systolic function and normal bio AVR  2  Hypertension - improved control  3  s/p ICD for ventricular tachycardia  4  DVT - on warfarin   5  Paroxysmal atrial fibrillation - in NSR   on Sotalol  On anticoagulation    6  Chronic diastolic heart failure/PH/RV failure -      Recommendations:  1  Continue current medications, including current dosing of diuretics  2  Check basic metabolic profile in approx 2 weeks  3  Continue remainder of medications  4  Follow up in 2 months  HPI: Scarlett Ceja is a 81y o  year old male with morbid obesity, diastolic HF with PH/RV failure, hypertension,  aortic valve replacement for endocarditis who returns for follow up  Admitted to hospital 5/22 with dyspnea/fluid overload - had preserved LV LV systolic function, mild-mod MR, mod TR, but elevated PAP and RV dysfunction  Diuretics increased at last visit a month ago by HF team to torsemide 60mg daily  Still having dyspnea on exertion, but improving  Has lost 20 lbs since visit with HF one month ago  Review of Systems   Constitutional: Negative  HENT: Negative  Eyes: Negative  Respiratory: Positive for shortness of breath  Negative for chest tightness  Cardiovascular: Positive for leg swelling  Negative for chest pain and palpitations  Gastrointestinal: Negative  Endocrine: Negative  Genitourinary: Negative  Musculoskeletal: Negative  Skin: Negative  Allergic/Immunologic: Negative  Neurological: Negative  Hematological: Negative  Psychiatric/Behavioral: Negative  All other systems reviewed and are negative          Past Medical History:   Diagnosis Date    Anemia     Arthritis     Atrial fibrillation ([facility] )     Basal cell carcinoma 03/22/2022    Tip of Nose    CHF (congestive heart failure) ([facility]     Diabetes mellitus ([facility] )     Niddm    DVT (deep vein thrombosis) in pregnancy 1966    not in pregnancy    DVT (deep venous thrombosis) ([place] [address] ) 1966    Dyslipidemia     GERD (gastroesophageal reflux disease)     Hyperlipidemia     Hypertension     Irregular heart beat     Afib    Morbid obesity due to excess calories ([facility] )     Resolved 9/2/2014     Pulmonary embolism (HCC)     Sepsis ([facility] )     Squamous cell skin cancer 07/30/2020    Left posterior scalp    Visual impairment      Past Surgical History:   Procedure Laterality Date    AORTIC VALVE REPLACEMENT      CARDIAC DEFIBRILLATOR PLACEMENT  04/2014   [facility] CARDIAC SURGERY  02/2014    AVR    COLONOSCOPY      INSERT / REPLACE / REMOVE PACEMAKER      JOINT REPLACEMENT Left     LTKR    MOHS SURGERY  07/30/2020    Left posterior scalp, Dr William Goodrich  04/18/2022    [facility] Tip of Nose- Dr Olinda Bates SEP-FEM JUN Right 8/17/2018    Procedure: LEG PERFORATED INJECTION SCLEROTHERAPY;  Surgeon: Judson Mathias MD;  [facility];  Service: Vascular    REPLACEMENT TOTAL KNEE Right     TOTAL KNEE ARTHROPLASTY Left     VASCULAR SURGERY      VENA CAVA FILTER PLACEMENT      Interruption inferior vena cava, Josue filter, placement    WISDOM TOOTH EXTRACTION       Social History     Substance and Sexual Activity   Alcohol Use Not Currently     Social History     Substance and Sexual Activity   Drug Use No     Social History     Tobacco Use   Smoking Status Never Smoker   Smokeless Tobacco Never Used     Family History   Problem Relation Age of Onset    Arthritis Mother     Stroke Mother     Arthritis Father     Cancer Father     Arthritis Daughter        Allergies:   Allergies   Allergen Reactions    Tramadol Other (See Comments)     intolerance       Medications:     Current Outpatient Medications:     acetaminophen (TYLENOL) 325 mg tablet, Take 2 tablets by mouth every 6 (six) hours as needed for mild pain or fever, Disp: 30 tablet, Rfl: 0    albuterol (PROVENTIL HFA,VENTOLIN HFA) 90 mcg/act inhaler, , Disp: , Rfl:     atorvastatin (LIPITOR) 40 mg tablet, Take 40 mg by mouth daily after dinner Atorvastatin Calcium 40 MG Oral Tablet Take 1 tablet daily  Refills: 0  Active , Disp: , Rfl:     B Complex-C (SUPER B COMPLEX PO), Take 1 capsule by mouth daily , Disp: , Rfl:     colchicine (COLCRYS) 0 6 mg tablet, Take 0 6 mg by mouth as needed for muscle/joint pain, Disp: , Rfl:     fluticasone (FLONASE) 50 mcg/act nasal spray, 2 sprays as needed Shake liquid and spray , Disp: , Rfl:     gabapentin (NEURONTIN) 100 mg capsule, Take 100 mg by mouth daily, Disp: , Rfl:     HYDROcodone-acetaminophen (NORCO) 5-325 mg per tablet, Take 1 tablet by mouth every 6 (six) hours as needed, Disp: , Rfl:     Iron Combinations (NIFEREX) TABS, Take 1 tablet by mouth in the morning, Disp: , Rfl: 5    metFORMIN (GLUCOPHAGE) 1000 MG tablet, Take 1,000 mg by mouth daily after dinner MetFORMIN HCl 1000 MG (MOD) TB24 Take one tablet daily  Refills: 0  Active , Disp: , Rfl:     Multiple Vitamin (MULTIVITAMINS PO), Take 1 tablet by mouth daily, Disp: , Rfl:     mupirocin (BACTROBAN) 2 % ointment, as needed , Disp: , Rfl:     omeprazole (PriLOSEC) 20 mg delayed release capsule, Omeprazole 20 MG Oral Tablet Delayed Release Take 1 tablet daily  Refills: 0  Active, Disp: , Rfl:     polyethylene glycol (GLYCOLAX) 17 GM/SCOOP powder, Take 17 g by mouth 2 (two) times a day, Disp: , Rfl:     potassium chloride (K-DUR,KLOR-CON) 20 mEq tablet, Take 20 mEq by mouth 2 (two) times a day Potassium Chloride Jesusita ER 20 MEQ Oral Tablet Extended Release Take 1 tablet daily  Refills: 0  Active , Disp: , Rfl:     sotalol (BETAPACE) 80 mg tablet, Take 80 mg by mouth every 12 (twelve) hours , Disp: , Rfl: 2    torsemide (DEMADEX) 20 mg tablet, TAKE 3 TABLETS(60 MG TOTAL) BY MOUTH EVERY DAY   CAN TAKE 40 MG IN THE AM AND 20 MG IN THE AFTERNOON, Disp: 270 tablet, Rfl: 3    warfarin (COUMADIN) 4 mg tablet, Take 1 tablet (4 mg total) by mouth 2 (two) times a week Patient take 4mg everyday but Tuesday and Friday  (Patient taking differently: Take 4 mg by mouth 2 (two) times a week 4 or 6 mg as directed), Disp: 90 tablet, Rfl: 0    warfarin (COUMADIN) 6 mg tablet, Take 6 mg by mouth The patient takes on Tuesday and Fridays, Disp: , Rfl:     amoxicillin (AMOXIL) 500 mg capsule, As needed (Patient not taking: No sig reported), Disp: , Rfl: 3    cholecalciferol (VITAMIN D3) 1,000 units tablet, Take 2,000 Units by mouth daily (Patient not taking: No sig reported), Disp: , Rfl:       Wt Readings from Last 3 Encounters:   08/23/22 (!) 143 kg (314 lb 3 2 oz)   08/02/22 (!) 144 kg (317 lb)   07/18/22 (!) 152 kg (334 lb 6 4 oz)     Temp Readings from Last 3 Encounters:   05/09/22 98 2 °F (36 8 °C) (Oral)   04/18/22 97 8 °F (36 6 °C) (Temporal)   04/04/22 98 2 °F (36 8 °C) (Temporal)     BP Readings from Last 3 Encounters:   08/23/22 132/74   08/02/22 130/78   07/18/22 138/74     Pulse Readings from Last 3 Encounters:   08/23/22 64   07/18/22 69   06/21/22 68         Physical Exam  HENT:      Head: Atraumatic  Mouth/Throat:      Mouth: Mucous membranes are moist    Eyes:      Extraocular Movements: Extraocular movements intact  Cardiovascular:      Rate and Rhythm: Normal rate and regular rhythm  Heart sounds: Normal heart sounds  Pulmonary:      Effort: Pulmonary effort is normal       Breath sounds: Normal breath sounds  Abdominal:      General: Abdomen is flat  Musculoskeletal:         General: Normal range of motion  Cervical back: Normal range of motion  Skin:     General: Skin is warm  Neurological:      General: No focal deficit present  Mental Status: He is alert and oriented to person, place, and time     Psychiatric:         Mood and Affect: Mood normal          Behavior: Behavior normal            Laboratory Studies:  CMP:  Lab Results   Component Value Date     10/10/2015    K 4 0 2022     2022    CO2 26 2022    ANIONGAP 8 10/10/2015    BUN 32 (H) 2022    CREATININE 1 93 (H) 2022    GLUCOSE 121 10/10/2015    AST 15 2022    ALT 13 2022    BILITOT 2 23 (H) 10/10/2015    EGFR 30 2022       Cardiac testing:   EKG reviewed personally: Atrial bigeminy 64 LBBB  Results for orders placed during the hospital encounter of 20    Echo complete with contrast if indicated    Narrative  [address]  [phone]    Transthoracic Echocardiogram  2D, M-mode, Doppler, and Color Doppler    Study date:  2020    Patient: Connor Slaughter  MR number: [id number]  Account number: [de-identified]  : 1935  Age: 81 years  Gender: Male  Status: Outpatient  Location: [address] [place] [address] [facility] [place]  Height: 73 in  Weight: 351 3 lb  BP: 152/ 73 mmHg    Indications: PAF; Acute on chronic diastolic HF;s/p AVR  Diagnoses: I35 9 - Nonrheumatic aortic valve disorder, unspecified, I48 0 - Atrial fibrillation, I50 9 - Heart failure, unspecified    Sonographer:  ABBY Hodges  Interpreting Physician:  Connor Solomon MD  Primary Physician:  Aubrey Matute DO  Referring Physician:  Maryana Cole MD  Group:  [facility]  Cardiology Fellow:  Jocelyn Blevins DO    SUMMARY    PROCEDURE INFORMATION:  This was a technically difficult study  Echocardiographic views were limited due to restricted patient mobility, poor patient compliance, poor acoustic window availability, decreased penetration, and lung interference  LEFT VENTRICLE:  Systolic function was normal  Ejection fraction was estimated to be 55 %  Although no diagnostic regional wall motion abnormality was identified, this possibility cannot be completely excluded on the basis of this study    Wall thickness was mildly to moderately increased  There was mild concentric hypertrophy  RIGHT VENTRICLE:  The ventricle was mildly dilated  Wall thickness was mildly increased  LEFT ATRIUM:  The atrium was mildly to moderately dilated  RIGHT ATRIUM:  The atrium was mildly to moderately dilated  MITRAL VALVE:  There was mild to moderate annular calcification  There was mild regurgitation  AORTIC VALVE:  A bioprosthesis was present  It exhibited normal function  The peak valve velocity was 197 cm/s  The mean valve velocity was 152 cm/s  Valve peak gradient was 16 mmHg  Valve mean gradient was 9 mmHg  Estimated aortic valve area (by VTI) was 2 16 cmï¾²  TRICUSPID VALVE:  There was mild regurgitation  Pulmonary artery systolic pressure was mildly increased  Estimated peak PA pressure was 40 mmHg  PULMONIC VALVE:  There was mild regurgitation  HISTORY: PRIOR HISTORY: HTN;s/p ICD;VT;SOB; Morbid obesity  PROCEDURE: The study was performed in the [address] [facility] [place]  This was a routine study  The transthoracic approach was used  The study included complete 2D imaging, M-mode, complete spectral Doppler, and color Doppler  The  heart rate was 67 bpm, at the start of the study  Images were obtained from the parasternal, apical, subcostal, and suprasternal notch acoustic windows  Echocardiographic views were limited due to restricted patient mobility, poor patient  compliance, poor acoustic window availability, decreased penetration, and lung interference  This was a technically difficult study  LEFT VENTRICLE: Size was normal  Systolic function was normal  Ejection fraction was estimated to be 55 %  Although no diagnostic regional wall motion abnormality was identified, this possibility cannot be completely excluded on the basis  of this study  Wall thickness was mildly to moderately increased  There was mild concentric hypertrophy  RIGHT VENTRICLE: The ventricle was mildly dilated   Systolic function was low normal  Wall thickness was mildly increased  A pacing wire was present in the ventricular cavity  LEFT ATRIUM: The atrium was mildly to moderately dilated  RIGHT ATRIUM: The atrium was mildly to moderately dilated  MITRAL VALVE: There was mild to moderate annular calcification  There was normal leaflet separation  DOPPLER: The transmitral velocity was within the normal range  There was no evidence for stenosis  There was mild regurgitation  AORTIC VALVE: A bioprosthesis was present  It exhibited normal function  TRICUSPID VALVE: The valve structure was normal  There was normal leaflet separation  DOPPLER: The transtricuspid velocity was within the normal range  There was no evidence for stenosis  There was mild regurgitation  Pulmonary artery  systolic pressure was mildly increased  Estimated peak PA pressure was 40 mmHg  PULMONIC VALVE: Leaflets exhibited normal thickness, no calcification, and normal cuspal separation  DOPPLER: The transpulmonic velocity was within the normal range  There was mild regurgitation  PERICARDIUM: There was no pericardial effusion  AORTA: The root exhibited normal size  SYSTEMIC VEINS: IVC: The inferior vena cava was not well visualized      MEASUREMENT TABLES    2D MEASUREMENTS  LVOT   (Reference normals)  Diam   23 mm   (--)    DOPPLER MEASUREMENTS  LVOT   (Reference normals)  Peak annie   101 cm/s   (--)  Mean annie   70 cm/s   (--)  VTI   23 cm   (--)  Peak gradient   4 mmHg   (--)  Mean gradient   2 2 mmHg   (--)  Stroke vol   95 56 ml   (--)  Aortic valve   (Reference normals)  Peak annie   197 cm/s   (--)  Mean annie   152 cm/s   (--)  VTI   44 cm   (--)  Peak gradient   16 mmHg   (--)  Mean gradient   9 mmHg   (--)  Obstr index, VTI   0 52    (--)  Valve area, VTI   2 16 cmï¾²   (--)  Area index, VTI   0 79 cmï¾²/mï¾²   (--)  Obstr index, Vmax   0 51    (--)  Valve area, Vmax   2 12 cmï¾²   (--)  Area index, Vmax   0 77 cmï¾²/mï¾²   (--)  Obstr index, Vmean   0 46    (--)  Valve area, Vmean   1 91 cmï¾²   (--)  Area index, Vmean   0 7 cmï¾²/mï¾²   (--)    SYSTEM MEASUREMENT TABLES    2D  %FS: 22 82 %  Ao Diam: 2 49 cm  EDV(Teich): 57 38 ml  EF(Teich): 46 66 %  ESV(Teich): 30 61 ml  IVSd: 1 96 cm  LA Area: 26 79 cm2  LA Diam: 4 97 cm  LVEDV MOD A4C: 112 1 ml  LVEF MOD A4C: 50 26 %  LVESV MOD A4C: 55 76 ml  LVIDd: 3 68 cm  LVIDs: 2 84 cm  LVLd A4C: 8 11 cm  LVLs A4C: 6 99 cm  LVOT Diam: 2 25 cm  LVPWd: 1 98 cm  RA Area: 24 17 cm2  RVIDd: 4 17 cm  SV MOD A4C: 56 34 ml  SV(Teich): 26 78 ml    CW  AV Env  Ti: 296 86 ms  AV VTI: 39 2 cm  AV Vmax: 2 1 m/s  AV Vmean: 1 32 m/s  AV maxP 68 mmHg  AV meanP 71 mmHg  TR Vmax: 2 85 m/s  TR maxP 45 mmHg    MM  TAPSE: 1 69 cm    PW  KISHA (VTI): 2 34 cm2  KISHA Vmax: 1 91 cm2  AVAI (VTI): 0 cm2/m2  AVAI Vmax: 0 cm2/m2  E' Sept: 0 06 m/s  E/E' Sept: 19 55  LVOT Env  Ti: 327 31 ms  LVOT VTI: 22 97 cm  LVOT Vmax: 1 01 m/s  LVOT Vmean: 0 7 m/s  LVOT maxP 04 mmHg  LVOT meanP 24 mmHg  LVSI Dopp: 33 45 ml/m2  LVSV Dopp: 91 66 ml  MV A Juan: 1 43 m/s  MV Dec [place]: 5 21 m/s2  MV DecT: 213 91 ms  MV E Juan: 1 11 m/s  MV E/A Ratio: 0 78  MV PHT: 62 03 ms  MVA By PHT: 3 55 cm2    [employer] Accredited Echocardiography Laboratory    Prepared and electronically signed by    Artemio Jensen MD  Signed [id number] 10:03:59    No results found for this or any previous visit  No results found for this or any previous visit  No results found for this or any previous visit

## 2022-08-26 ENCOUNTER — REMOTE DEVICE CLINIC VISIT (OUTPATIENT)
Dept: CARDIOLOGY CLINIC | Facility: CLINIC | Age: 87
End: 2022-08-26
Payer: MEDICARE

## 2022-08-26 DIAGNOSIS — Z95.810 PRESENCE OF AUTOMATIC CARDIOVERTER/DEFIBRILLATOR (AICD): Primary | ICD-10-CM

## 2022-08-26 PROCEDURE — 93296 REM INTERROG EVL PM/IDS: CPT | Performed by: INTERNAL MEDICINE

## 2022-08-26 PROCEDURE — 93295 DEV INTERROG REMOTE 1/2/MLT: CPT | Performed by: INTERNAL MEDICINE

## 2022-08-29 ENCOUNTER — HOSPITAL ENCOUNTER (INPATIENT)
Facility: HOSPITAL | Age: 87
LOS: 4 days | Discharge: HOME HEALTH | DRG: 445 | End: 2022-09-03
Attending: EMERGENCY MEDICINE | Admitting: INTERNAL MEDICINE
Payer: MEDICARE

## 2022-08-29 ENCOUNTER — APPOINTMENT (OUTPATIENT)
Dept: ULTRASOUND IMAGING | Facility: HOSPITAL | Age: 87
DRG: 445 | End: 2022-08-29
Payer: MEDICARE

## 2022-08-29 ENCOUNTER — APPOINTMENT (EMERGENCY)
Dept: CT IMAGING | Facility: HOSPITAL | Age: 87
DRG: 445 | End: 2022-08-29
Payer: MEDICARE

## 2022-08-29 ENCOUNTER — APPOINTMENT (OUTPATIENT)
Dept: RADIOLOGY | Facility: HOSPITAL | Age: 87
DRG: 445 | End: 2022-08-29
Payer: MEDICARE

## 2022-08-29 DIAGNOSIS — R07.9 CHEST PAIN: ICD-10-CM

## 2022-08-29 DIAGNOSIS — I48.0 PAF (PAROXYSMAL ATRIAL FIBRILLATION) (HCC): ICD-10-CM

## 2022-08-29 DIAGNOSIS — K81.0 ACUTE CHOLECYSTITIS: Primary | ICD-10-CM

## 2022-08-29 DIAGNOSIS — Z95.2 S/P AVR: ICD-10-CM

## 2022-08-29 LAB
2HR DELTA HS TROPONIN: 1 NG/L
4HR DELTA HS TROPONIN: 1 NG/L
ABO GROUP BLD: NORMAL
ALBUMIN SERPL BCP-MCNC: 4.2 G/DL (ref 3.5–5)
ALP SERPL-CCNC: 78 U/L (ref 34–104)
ALT SERPL W P-5'-P-CCNC: 11 U/L (ref 7–52)
ANION GAP SERPL CALCULATED.3IONS-SCNC: 10 MMOL/L (ref 4–13)
APTT PPP: 45 SECONDS (ref 23–37)
AST SERPL W P-5'-P-CCNC: 17 U/L (ref 13–39)
BASOPHILS # BLD AUTO: 0.03 THOUSANDS/ΜL (ref 0–0.1)
BASOPHILS NFR BLD AUTO: 1 % (ref 0–1)
BILIRUB SERPL-MCNC: 1.99 MG/DL (ref 0.2–1)
BLD GP AB SCN SERPL QL: NEGATIVE
BNP SERPL-MCNC: 385 PG/ML (ref 0–100)
BUN SERPL-MCNC: 33 MG/DL (ref 5–25)
CALCIUM SERPL-MCNC: 9.5 MG/DL (ref 8.4–10.2)
CARDIAC TROPONIN I PNL SERPL HS: 8 NG/L
CARDIAC TROPONIN I PNL SERPL HS: 9 NG/L
CARDIAC TROPONIN I PNL SERPL HS: 9 NG/L
CHLORIDE SERPL-SCNC: 103 MMOL/L (ref 96–108)
CO2 SERPL-SCNC: 26 MMOL/L (ref 21–32)
CREAT SERPL-MCNC: 1.63 MG/DL (ref 0.6–1.3)
EOSINOPHIL # BLD AUTO: 0.25 THOUSAND/ΜL (ref 0–0.61)
EOSINOPHIL NFR BLD AUTO: 5 % (ref 0–6)
ERYTHROCYTE [DISTWIDTH] IN BLOOD BY AUTOMATED COUNT: 16.5 % (ref 11.6–15.1)
GFR SERPL CREATININE-BSD FRML MDRD: 37 ML/MIN/1.73SQ M
GLUCOSE SERPL-MCNC: 166 MG/DL (ref 65–140)
HCT VFR BLD AUTO: 33.4 % (ref 36.5–49.3)
HGB BLD-MCNC: 10.4 G/DL (ref 12–17)
IMM GRANULOCYTES # BLD AUTO: 0.01 THOUSAND/UL (ref 0–0.2)
IMM GRANULOCYTES NFR BLD AUTO: 0 % (ref 0–2)
INR PPP: 2.59 (ref 0.84–1.19)
LACTATE SERPL-SCNC: 1.2 MMOL/L (ref 0.5–2)
LIPASE SERPL-CCNC: 53 U/L (ref 11–82)
LYMPHOCYTES # BLD AUTO: 1 THOUSANDS/ΜL (ref 0.6–4.47)
LYMPHOCYTES NFR BLD AUTO: 18 % (ref 14–44)
MCH RBC QN AUTO: 27.1 PG (ref 26.8–34.3)
MCHC RBC AUTO-ENTMCNC: 31.1 G/DL (ref 31.4–37.4)
MCV RBC AUTO: 87 FL (ref 82–98)
MONOCYTES # BLD AUTO: 0.68 THOUSAND/ΜL (ref 0.17–1.22)
MONOCYTES NFR BLD AUTO: 12 % (ref 4–12)
NEUTROPHILS # BLD AUTO: 3.53 THOUSANDS/ΜL (ref 1.85–7.62)
NEUTS SEG NFR BLD AUTO: 64 % (ref 43–75)
NRBC BLD AUTO-RTO: 0 /100 WBCS
PLATELET # BLD AUTO: 188 THOUSANDS/UL (ref 149–390)
PMV BLD AUTO: 9.9 FL (ref 8.9–12.7)
POTASSIUM SERPL-SCNC: 3.9 MMOL/L (ref 3.5–5.3)
PROT SERPL-MCNC: 7.2 G/DL (ref 6.4–8.4)
PROTHROMBIN TIME: 28 SECONDS (ref 11.6–14.5)
RBC # BLD AUTO: 3.84 MILLION/UL (ref 3.88–5.62)
RH BLD: NEGATIVE
SODIUM SERPL-SCNC: 139 MMOL/L (ref 135–147)
SPECIMEN EXPIRATION DATE: NORMAL
WBC # BLD AUTO: 5.5 THOUSAND/UL (ref 4.31–10.16)

## 2022-08-29 PROCEDURE — 85025 COMPLETE CBC W/AUTO DIFF WBC: CPT | Performed by: EMERGENCY MEDICINE

## 2022-08-29 PROCEDURE — 83880 ASSAY OF NATRIURETIC PEPTIDE: CPT | Performed by: EMERGENCY MEDICINE

## 2022-08-29 PROCEDURE — 86900 BLOOD TYPING SEROLOGIC ABO: CPT | Performed by: EMERGENCY MEDICINE

## 2022-08-29 PROCEDURE — 93005 ELECTROCARDIOGRAM TRACING: CPT

## 2022-08-29 PROCEDURE — 96374 THER/PROPH/DIAG INJ IV PUSH: CPT

## 2022-08-29 PROCEDURE — 83690 ASSAY OF LIPASE: CPT | Performed by: EMERGENCY MEDICINE

## 2022-08-29 PROCEDURE — 83605 ASSAY OF LACTIC ACID: CPT | Performed by: EMERGENCY MEDICINE

## 2022-08-29 PROCEDURE — 85730 THROMBOPLASTIN TIME PARTIAL: CPT

## 2022-08-29 PROCEDURE — 96376 TX/PRO/DX INJ SAME DRUG ADON: CPT

## 2022-08-29 PROCEDURE — 71275 CT ANGIOGRAPHY CHEST: CPT

## 2022-08-29 PROCEDURE — 76705 ECHO EXAM OF ABDOMEN: CPT

## 2022-08-29 PROCEDURE — 80053 COMPREHEN METABOLIC PANEL: CPT | Performed by: EMERGENCY MEDICINE

## 2022-08-29 PROCEDURE — 96361 HYDRATE IV INFUSION ADD-ON: CPT

## 2022-08-29 PROCEDURE — 86901 BLOOD TYPING SEROLOGIC RH(D): CPT | Performed by: EMERGENCY MEDICINE

## 2022-08-29 PROCEDURE — 86850 RBC ANTIBODY SCREEN: CPT | Performed by: EMERGENCY MEDICINE

## 2022-08-29 PROCEDURE — 36415 COLL VENOUS BLD VENIPUNCTURE: CPT | Performed by: EMERGENCY MEDICINE

## 2022-08-29 PROCEDURE — 99285 EMERGENCY DEPT VISIT HI MDM: CPT

## 2022-08-29 PROCEDURE — 71045 X-RAY EXAM CHEST 1 VIEW: CPT

## 2022-08-29 PROCEDURE — 74174 CTA ABD&PLVS W/CONTRAST: CPT

## 2022-08-29 PROCEDURE — 85610 PROTHROMBIN TIME: CPT

## 2022-08-29 PROCEDURE — G1004 CDSM NDSC: HCPCS

## 2022-08-29 PROCEDURE — 96375 TX/PRO/DX INJ NEW DRUG ADDON: CPT

## 2022-08-29 PROCEDURE — 84484 ASSAY OF TROPONIN QUANT: CPT | Performed by: EMERGENCY MEDICINE

## 2022-08-29 PROCEDURE — 99285 EMERGENCY DEPT VISIT HI MDM: CPT | Performed by: EMERGENCY MEDICINE

## 2022-08-29 RX ORDER — FENTANYL CITRATE 50 UG/ML
100 INJECTION, SOLUTION INTRAMUSCULAR; INTRAVENOUS ONCE
Status: COMPLETED | OUTPATIENT
Start: 2022-08-29 | End: 2022-08-29

## 2022-08-29 RX ORDER — ONDANSETRON 2 MG/ML
4 INJECTION INTRAMUSCULAR; INTRAVENOUS ONCE
Status: COMPLETED | OUTPATIENT
Start: 2022-08-29 | End: 2022-08-29

## 2022-08-29 RX ORDER — NITROGLYCERIN 0.4 MG/1
0.4 TABLET SUBLINGUAL ONCE
Status: COMPLETED | OUTPATIENT
Start: 2022-08-29 | End: 2022-08-29

## 2022-08-29 RX ORDER — FENTANYL CITRATE 50 UG/ML
50 INJECTION, SOLUTION INTRAMUSCULAR; INTRAVENOUS ONCE
Status: COMPLETED | OUTPATIENT
Start: 2022-08-29 | End: 2022-08-29

## 2022-08-29 RX ORDER — MORPHINE SULFATE 4 MG/ML
4 INJECTION, SOLUTION INTRAMUSCULAR; INTRAVENOUS ONCE
Status: COMPLETED | OUTPATIENT
Start: 2022-08-29 | End: 2022-08-29

## 2022-08-29 RX ADMIN — MORPHINE SULFATE 4 MG: 4 INJECTION INTRAVENOUS at 20:05

## 2022-08-29 RX ADMIN — IOHEXOL 100 ML: 350 INJECTION, SOLUTION INTRAVENOUS at 21:15

## 2022-08-29 RX ADMIN — ONDANSETRON 4 MG: 2 INJECTION INTRAMUSCULAR; INTRAVENOUS at 20:06

## 2022-08-29 RX ADMIN — NITROGLYCERIN 0.4 MG: 0.4 TABLET SUBLINGUAL at 20:02

## 2022-08-29 RX ADMIN — FENTANYL CITRATE 50 MCG: 50 INJECTION INTRAMUSCULAR; INTRAVENOUS at 23:21

## 2022-08-29 RX ADMIN — FENTANYL CITRATE 50 MCG: 50 INJECTION INTRAMUSCULAR; INTRAVENOUS at 22:11

## 2022-08-29 RX ADMIN — NITROGLYCERIN 0.4 MG: 0.4 TABLET SUBLINGUAL at 20:21

## 2022-08-29 RX ADMIN — FENTANYL CITRATE 100 MCG: 50 INJECTION INTRAMUSCULAR; INTRAVENOUS at 20:39

## 2022-08-29 RX ADMIN — SODIUM CHLORIDE 500 ML: 0.9 INJECTION, SOLUTION INTRAVENOUS at 20:40

## 2022-08-29 NOTE — PROGRESS NOTES
Results for orders placed or performed in visit on 08/26/22   Cardiac EP device report    Narrative    MDT DUAL CHAMBER ICD/ NOT MRI CONDITIONAL  CARELINK TRANSMISSION: BATTERY VOLTAGE ADEQUATE  (2 8 YRS) AP 6%  <1%  ALL AVAILABLE LEAD PARAMETERS WITHIN NORMAL LIMITS  NO SIGNIFICANT HIGH RATE EPISODES  14 AT/AF EPISODES DETECTED <3 HOURS  PATIENT IS ON  WARFARIN AND SOTALOL  NORMAL DEVICE FUNCTION  ---HAWKINS

## 2022-08-29 NOTE — LETTER
Contains abnormal data Body fluid culture and Gram stain  Order: [id number]   Status: Final result     Visible to patient: Yes (not seen)     Next appt: Today at 02:00 PM in [place] [address] ([address] [facility], [facility])    Specimen Information: Gallbladder;  Body Fluid         0 Result Notes    Body Fluid Culture, Sterile 3+ Growth of Streptococcus sanguis Abnormal     Not viable on subculture - unable to perform sensitivities            GRAM STAIN RESULT   Abnormal   2+ Gram positive cocci in pairs and chains      No polys seen

## 2022-08-30 ENCOUNTER — APPOINTMENT (OUTPATIENT)
Dept: NUCLEAR MEDICINE | Facility: HOSPITAL | Age: 87
DRG: 445 | End: 2022-08-30
Payer: MEDICARE

## 2022-08-30 PROBLEM — R10.13 EPIGASTRIC PAIN: Status: ACTIVE | Noted: 2022-08-30

## 2022-08-30 LAB
ALBUMIN SERPL BCP-MCNC: 4.4 G/DL (ref 3.5–5)
ALP SERPL-CCNC: 77 U/L (ref 34–104)
ALT SERPL W P-5'-P-CCNC: 13 U/L (ref 7–52)
ANION GAP SERPL CALCULATED.3IONS-SCNC: 11 MMOL/L (ref 4–13)
AST SERPL W P-5'-P-CCNC: 18 U/L (ref 13–39)
ATRIAL RATE: 56 BPM
ATRIAL RATE: 61 BPM
ATRIAL RATE: 62 BPM
BASOPHILS # BLD AUTO: 0.03 THOUSANDS/ΜL (ref 0–0.1)
BASOPHILS NFR BLD AUTO: 0 % (ref 0–1)
BILIRUB SERPL-MCNC: 2.3 MG/DL (ref 0.2–1)
BUN SERPL-MCNC: 27 MG/DL (ref 5–25)
CALCIUM SERPL-MCNC: 9.7 MG/DL (ref 8.4–10.2)
CHLORIDE SERPL-SCNC: 104 MMOL/L (ref 96–108)
CO2 SERPL-SCNC: 26 MMOL/L (ref 21–32)
CREAT SERPL-MCNC: 1.59 MG/DL (ref 0.6–1.3)
EOSINOPHIL # BLD AUTO: 0.03 THOUSAND/ΜL (ref 0–0.61)
EOSINOPHIL NFR BLD AUTO: 0 % (ref 0–6)
ERYTHROCYTE [DISTWIDTH] IN BLOOD BY AUTOMATED COUNT: 16.6 % (ref 11.6–15.1)
FLUAV RNA RESP QL NAA+PROBE: NEGATIVE
FLUBV RNA RESP QL NAA+PROBE: NEGATIVE
GFR SERPL CREATININE-BSD FRML MDRD: 38 ML/MIN/1.73SQ M
GLUCOSE P FAST SERPL-MCNC: 171 MG/DL (ref 65–99)
GLUCOSE SERPL-MCNC: 137 MG/DL (ref 65–140)
GLUCOSE SERPL-MCNC: 143 MG/DL (ref 65–140)
GLUCOSE SERPL-MCNC: 163 MG/DL (ref 65–140)
GLUCOSE SERPL-MCNC: 171 MG/DL (ref 65–140)
HCT VFR BLD AUTO: 37 % (ref 36.5–49.3)
HGB BLD-MCNC: 11.3 G/DL (ref 12–17)
IMM GRANULOCYTES # BLD AUTO: 0.06 THOUSAND/UL (ref 0–0.2)
IMM GRANULOCYTES NFR BLD AUTO: 1 % (ref 0–2)
INR PPP: 2.49 (ref 0.84–1.19)
LYMPHOCYTES # BLD AUTO: 0.65 THOUSANDS/ΜL (ref 0.6–4.47)
LYMPHOCYTES NFR BLD AUTO: 6 % (ref 14–44)
MCH RBC QN AUTO: 26.8 PG (ref 26.8–34.3)
MCHC RBC AUTO-ENTMCNC: 30.5 G/DL (ref 31.4–37.4)
MCV RBC AUTO: 88 FL (ref 82–98)
MONOCYTES # BLD AUTO: 1.25 THOUSAND/ΜL (ref 0.17–1.22)
MONOCYTES NFR BLD AUTO: 11 % (ref 4–12)
NEUTROPHILS # BLD AUTO: 9.89 THOUSANDS/ΜL (ref 1.85–7.62)
NEUTS SEG NFR BLD AUTO: 82 % (ref 43–75)
NRBC BLD AUTO-RTO: 0 /100 WBCS
P AXIS: 53 DEGREES
PLATELET # BLD AUTO: 192 THOUSANDS/UL (ref 149–390)
PMV BLD AUTO: 10.4 FL (ref 8.9–12.7)
POTASSIUM SERPL-SCNC: 4.3 MMOL/L (ref 3.5–5.3)
PR INTERVAL: 312 MS
PROT SERPL-MCNC: 7.7 G/DL (ref 6.4–8.4)
PROTHROMBIN TIME: 27.2 SECONDS (ref 11.6–14.5)
QRS AXIS: -80 DEGREES
QRS AXIS: 106 DEGREES
QRS AXIS: 253 DEGREES
QRSD INTERVAL: 166 MS
QRSD INTERVAL: 170 MS
QRSD INTERVAL: 178 MS
QT INTERVAL: 536 MS
QT INTERVAL: 536 MS
QT INTERVAL: 562 MS
QTC INTERVAL: 539 MS
QTC INTERVAL: 542 MS
QTC INTERVAL: 544 MS
RBC # BLD AUTO: 4.22 MILLION/UL (ref 3.88–5.62)
RSV RNA RESP QL NAA+PROBE: NEGATIVE
SARS-COV-2 RNA RESP QL NAA+PROBE: NEGATIVE
SODIUM SERPL-SCNC: 141 MMOL/L (ref 135–147)
T WAVE AXIS: 51 DEGREES
T WAVE AXIS: 69 DEGREES
T WAVE AXIS: 74 DEGREES
VENTRICULAR RATE: 56 BPM
VENTRICULAR RATE: 61 BPM
VENTRICULAR RATE: 62 BPM
WBC # BLD AUTO: 11.91 THOUSAND/UL (ref 4.31–10.16)

## 2022-08-30 PROCEDURE — A9537 TC99M MEBROFENIN: HCPCS

## 2022-08-30 PROCEDURE — 99223 1ST HOSP IP/OBS HIGH 75: CPT | Performed by: INTERNAL MEDICINE

## 2022-08-30 PROCEDURE — 96375 TX/PRO/DX INJ NEW DRUG ADDON: CPT

## 2022-08-30 PROCEDURE — 0241U HB NFCT DS VIR RESP RNA 4 TRGT: CPT | Performed by: PHYSICIAN ASSISTANT

## 2022-08-30 PROCEDURE — 78226 HEPATOBILIARY SYSTEM IMAGING: CPT

## 2022-08-30 PROCEDURE — 99284 EMERGENCY DEPT VISIT MOD MDM: CPT | Performed by: SURGERY

## 2022-08-30 PROCEDURE — 0F9430Z DRAINAGE OF GALLBLADDER WITH DRAINAGE DEVICE, PERCUTANEOUS APPROACH: ICD-10-PCS | Performed by: RADIOLOGY

## 2022-08-30 PROCEDURE — 36415 COLL VENOUS BLD VENIPUNCTURE: CPT | Performed by: PHYSICIAN ASSISTANT

## 2022-08-30 PROCEDURE — 82948 REAGENT STRIP/BLOOD GLUCOSE: CPT

## 2022-08-30 PROCEDURE — G1004 CDSM NDSC: HCPCS

## 2022-08-30 PROCEDURE — 80053 COMPREHEN METABOLIC PANEL: CPT | Performed by: PHYSICIAN ASSISTANT

## 2022-08-30 PROCEDURE — 93010 ELECTROCARDIOGRAM REPORT: CPT | Performed by: INTERNAL MEDICINE

## 2022-08-30 PROCEDURE — 85610 PROTHROMBIN TIME: CPT | Performed by: PHYSICIAN ASSISTANT

## 2022-08-30 PROCEDURE — 85025 COMPLETE CBC W/AUTO DIFF WBC: CPT | Performed by: PHYSICIAN ASSISTANT

## 2022-08-30 PROCEDURE — NC001 PR NO CHARGE: Performed by: INTERNAL MEDICINE

## 2022-08-30 PROCEDURE — 87040 BLOOD CULTURE FOR BACTERIA: CPT | Performed by: PHYSICIAN ASSISTANT

## 2022-08-30 RX ORDER — HYDROCODONE BITARTRATE AND ACETAMINOPHEN 5; 325 MG/1; MG/1
1 TABLET ORAL EVERY 6 HOURS PRN
Status: DISCONTINUED | OUTPATIENT
Start: 2022-08-30 | End: 2022-09-03 | Stop reason: HOSPADM

## 2022-08-30 RX ORDER — IRON POLYSACCHARIDE COMPLEX 150 MG
150 CAPSULE ORAL DAILY
Status: DISCONTINUED | OUTPATIENT
Start: 2022-08-30 | End: 2022-09-03 | Stop reason: HOSPADM

## 2022-08-30 RX ORDER — TORSEMIDE 20 MG/1
40 TABLET ORAL DAILY
Status: DISCONTINUED | OUTPATIENT
Start: 2022-08-30 | End: 2022-08-31

## 2022-08-30 RX ORDER — MORPHINE SULFATE 10 MG/ML
6 INJECTION, SOLUTION INTRAMUSCULAR; INTRAVENOUS
Status: COMPLETED | OUTPATIENT
Start: 2022-08-30 | End: 2022-08-30

## 2022-08-30 RX ORDER — WARFARIN SODIUM 4 MG/1
4 TABLET ORAL
Status: DISCONTINUED | OUTPATIENT
Start: 2022-08-30 | End: 2022-08-30

## 2022-08-30 RX ORDER — WARFARIN SODIUM 4 MG/1
4 TABLET ORAL
Status: DISCONTINUED | OUTPATIENT
Start: 2022-08-31 | End: 2022-08-30

## 2022-08-30 RX ORDER — SOTALOL HYDROCHLORIDE 80 MG/1
80 TABLET ORAL EVERY 12 HOURS
Status: DISCONTINUED | OUTPATIENT
Start: 2022-08-30 | End: 2022-09-03 | Stop reason: HOSPADM

## 2022-08-30 RX ORDER — ATORVASTATIN CALCIUM 40 MG/1
40 TABLET, FILM COATED ORAL
Status: DISCONTINUED | OUTPATIENT
Start: 2022-08-30 | End: 2022-09-03 | Stop reason: HOSPADM

## 2022-08-30 RX ORDER — WARFARIN SODIUM 6 MG/1
6 TABLET ORAL
Status: COMPLETED | OUTPATIENT
Start: 2022-08-30 | End: 2022-08-30

## 2022-08-30 RX ORDER — PANTOPRAZOLE SODIUM 40 MG/1
40 TABLET, DELAYED RELEASE ORAL
Status: DISCONTINUED | OUTPATIENT
Start: 2022-08-30 | End: 2022-09-03 | Stop reason: HOSPADM

## 2022-08-30 RX ORDER — INSULIN LISPRO 100 [IU]/ML
2-12 INJECTION, SOLUTION INTRAVENOUS; SUBCUTANEOUS EVERY 6 HOURS
Status: DISCONTINUED | OUTPATIENT
Start: 2022-08-30 | End: 2022-08-31

## 2022-08-30 RX ORDER — HYDROMORPHONE HCL/PF 1 MG/ML
0.5 SYRINGE (ML) INJECTION ONCE AS NEEDED
Status: COMPLETED | OUTPATIENT
Start: 2022-08-30 | End: 2022-08-30

## 2022-08-30 RX ORDER — HYDROMORPHONE HCL IN WATER/PF 6 MG/30 ML
0.2 PATIENT CONTROLLED ANALGESIA SYRINGE INTRAVENOUS EVERY 4 HOURS PRN
Status: DISCONTINUED | OUTPATIENT
Start: 2022-08-30 | End: 2022-09-03 | Stop reason: HOSPADM

## 2022-08-30 RX ORDER — ACETAMINOPHEN 325 MG/1
650 TABLET ORAL EVERY 6 HOURS PRN
Status: DISCONTINUED | OUTPATIENT
Start: 2022-08-30 | End: 2022-09-03 | Stop reason: HOSPADM

## 2022-08-30 RX ORDER — POTASSIUM CHLORIDE 20 MEQ/1
20 TABLET, EXTENDED RELEASE ORAL 2 TIMES DAILY
Status: DISCONTINUED | OUTPATIENT
Start: 2022-08-30 | End: 2022-09-03 | Stop reason: HOSPADM

## 2022-08-30 RX ORDER — GABAPENTIN 100 MG/1
100 CAPSULE ORAL DAILY
Status: DISCONTINUED | OUTPATIENT
Start: 2022-08-30 | End: 2022-09-03 | Stop reason: HOSPADM

## 2022-08-30 RX ORDER — TORSEMIDE 20 MG/1
20 TABLET ORAL EVERY EVENING
Status: DISCONTINUED | OUTPATIENT
Start: 2022-08-30 | End: 2022-08-31

## 2022-08-30 RX ORDER — PHYTONADIONE 5 MG/1
2.5 TABLET ORAL ONCE
Status: COMPLETED | OUTPATIENT
Start: 2022-08-30 | End: 2022-08-30

## 2022-08-30 RX ORDER — HYDROMORPHONE HCL/PF 1 MG/ML
0.5 SYRINGE (ML) INJECTION EVERY 6 HOURS PRN
Status: DISCONTINUED | OUTPATIENT
Start: 2022-08-30 | End: 2022-09-03 | Stop reason: HOSPADM

## 2022-08-30 RX ADMIN — SOTALOL HYDROCHLORIDE 80 MG: 80 TABLET ORAL at 16:19

## 2022-08-30 RX ADMIN — PHYTONADIONE 2.5 MG: 5 TABLET ORAL at 16:20

## 2022-08-30 RX ADMIN — WARFARIN SODIUM 6 MG: 6 TABLET ORAL at 08:59

## 2022-08-30 RX ADMIN — TORSEMIDE 40 MG: 20 TABLET ORAL at 08:57

## 2022-08-30 RX ADMIN — GABAPENTIN 100 MG: 100 CAPSULE ORAL at 08:57

## 2022-08-30 RX ADMIN — PIPERACILLIN AND TAZOBACTAM 3.38 G: 36; 4.5 INJECTION, POWDER, FOR SOLUTION INTRAVENOUS at 07:25

## 2022-08-30 RX ADMIN — ACETAMINOPHEN 650 MG: 325 TABLET ORAL at 08:57

## 2022-08-30 RX ADMIN — POLYSACCHARIDE-IRON COMPLEX 150 MG: 150 CAPSULE ORAL at 08:58

## 2022-08-30 RX ADMIN — MORPHINE SULFATE 6 MG: 10 INJECTION, SOLUTION INTRAMUSCULAR; INTRAVENOUS at 14:00

## 2022-08-30 RX ADMIN — HYDROMORPHONE HYDROCHLORIDE 0.5 MG: 1 INJECTION, SOLUTION INTRAMUSCULAR; INTRAVENOUS; SUBCUTANEOUS at 00:33

## 2022-08-30 RX ADMIN — POTASSIUM CHLORIDE 20 MEQ: 1500 TABLET, EXTENDED RELEASE ORAL at 08:58

## 2022-08-30 RX ADMIN — PANTOPRAZOLE SODIUM 40 MG: 40 TABLET, DELAYED RELEASE ORAL at 06:07

## 2022-08-30 RX ADMIN — POTASSIUM CHLORIDE 20 MEQ: 1500 TABLET, EXTENDED RELEASE ORAL at 17:36

## 2022-08-30 RX ADMIN — HYDROMORPHONE HYDROCHLORIDE 0.2 MG: 0.2 INJECTION, SOLUTION INTRAMUSCULAR; INTRAVENOUS; SUBCUTANEOUS at 20:01

## 2022-08-30 RX ADMIN — HYDROMORPHONE HYDROCHLORIDE 0.5 MG: 1 INJECTION, SOLUTION INTRAMUSCULAR; INTRAVENOUS; SUBCUTANEOUS at 21:14

## 2022-08-30 RX ADMIN — TORSEMIDE 20 MG: 20 TABLET ORAL at 17:36

## 2022-08-30 RX ADMIN — SOTALOL HYDROCHLORIDE 80 MG: 80 TABLET ORAL at 06:07

## 2022-08-30 NOTE — ASSESSMENT & PLAN NOTE
· P/w severe chest and epigastric pain  · HST neg x3, EKG without RADHA  · CTA dissection study neg for dissection   · Imaging shows gallstones but no pericholecystic inflammation on CT however RUQ US read as "equivocal for cholecystitis", CBD 6mm, no evidence of CBD stone  · T   Bili 2 3 not outside of baseline, no transaminitis, lactate neg  · Postprandial, positive Patel's sign on exam, new leukocytosis to 11 91, 8/30AM  · DDx  · Most likely:  · Acute cholecystitis, biliary colic, choledocholithiasis  · Less likely:  · Kidney stone  · MSK  · Rectus sheath hematoma  HIDA scan showed nonvisualization of gallbladder, consistent with acute cholecystitis    Plan:  · Continue NPO  · Plan as per surgery for percutaneous cholecystostomy  · Started Zosyn given new leukocytosis  · F/u blood cultures 8/31AM  · Appreciate further surgery input  · Continue to monitor CBC and LFTs

## 2022-08-30 NOTE — ASSESSMENT & PLAN NOTE
Wt Readings from Last 3 Encounters:   08/29/22 (!) 145 kg (319 lb 7 1 oz)   08/23/22 (!) 143 kg (314 lb 3 2 oz)   08/02/22 (!) 144 kg (317 lb)       · BNP in 300's up from prior admissions but satting well on RA and no pulmonary edema on imaging  · Echo May 2022 (while decompensated) with EF 60-% and moderately reduced systolic function, dilated RV, PASP 70 mm Hg  · Examines euvolemic  · Cont home torsemide dose of 60 mg daily for now (increased to 60 mg in July 2022), cont BB   · Monitor daily weights

## 2022-08-30 NOTE — ASSESSMENT & PLAN NOTE
· Cont Sotalol 80 mg BID and AC with warfarin  · Warfarin managed by PCP Dr Melly Fraser of   · Dosing is 6 mg on Tuesdays and Fridays, 4 mg daily on all other days of the week

## 2022-08-30 NOTE — PROGRESS NOTES
[facility]  Progress Note - Jaime Gipson 1935, 81 y o  male MRN: [id number]  Unit/Bed#: S -01 Encounter: [id number]  Primary Care Provider: Katia Helms DO   Date and time admitted to hospital: 8/29/2022  7:32 PM    * Epigastric pain  Assessment & Plan  · P/w severe chest and epigastric pain  · HST neg x3, EKG without RADHA  · CTA dissection study neg for dissection   · Imaging shows gallstones but no pericholecystic inflammation on CT however RUQ US read as "equivocal for cholecystitis", CBD 6mm, no evidence of CBD stone  · T  Bili 2 3 not outside of baseline, no transaminitis, lactate neg  · Postprandial, positive Patel's sign on exam, new leukocytosis to 11 91, 8/30AM  · DDx  · Most likely:  · Acute cholecystitis, biliary colic, choledocholithiasis  · Less likely:  · Kidney stone  · MSK  · Rectus sheath hematoma  HIDA scan showed nonvisualization of gallbladder, consistent with acute cholecystitis    Plan:  · Continue NPO  · Plan as per surgery for percutaneous cholecystostomy  · Started Zosyn given new leukocytosis  · F/u blood cultures 8/31AM  · Appreciate further surgery input  · Continue to monitor CBC and LFTs    PAF (paroxysmal atrial fibrillation) ([facility])  Assessment & Plan    · Warfarin managed by PCP Dr Yaniv Gerber of   · Dosing is 6 mg on Tuesdays and Fridays, 4 mg daily on all other days of the week    Plan:  · Cont Sotalol 80 mg BID  · A m   INR - pending a m  values, start heparin drip  · Vitamin K 2 5 mg x1 now  · Warfarin held    OBINNA (obstructive sleep apnea)  Assessment & Plan  · Recent home sleep study suggestive of OBINNA but is in process of confirming with in office polysomnography    Chronic heart failure with preserved ejection fraction (HFpEF) ([facility])  Assessment & Plan  Wt Readings from Last 3 Encounters:   08/29/22 (!) 145 kg (319 lb 7 1 oz)   08/23/22 (!) 143 kg (314 lb 3 2 oz)   08/02/22 (!) 144 kg (317 lb)       · BNP in 300's up from prior admissions but satting well on RA and no pulmonary edema on imaging  · Echo May 2022 (while decompensated) with EF 60-% and moderately reduced systolic function, dilated RV, PASP 70 mm Hg  · Examines euvolemic  · Cont home torsemide dose of 60 mg daily for now (increased to 60 mg in July 2022), cont BB   · Monitor daily weights       Stage 3a chronic kidney disease with elevated creatinine  Assessment & Plan  Lab Results   Component Value Date    EGFR 38 08/30/2022    EGFR 37 08/29/2022    EGFR 30 07/19/2022    CREATININE 1 59 (H) 08/30/2022    CREATININE 1 63 (H) 08/29/2022    CREATININE 1 93 (H) 07/19/2022   · Cr 1 63 here, appears near baseline of approx 1 4-1 6  · A m  CMP    Ventricular tachycardia ([address] )  Assessment & Plan  · ICD placed with recent interrogation in March 2022, nml functioning device    DVT (deep venous thrombosis) ([facility])  Assessment & Plan  · Hx of DVT   Plan:  · Hold warfarin, monitor INR daily    Essential hypertension  Assessment & Plan  · Cont satolol and torsemide    S/P AVR  Assessment & Plan  · H/o endocarditis s/p AVR in 2014  · Echo 5/22 with nml functioning valve  · Hold Warfarin     Anemia  Assessment & Plan  · Hgb 10 4 on arrival, stable  Baseline around 11  Plan:  · Monitor CBC    Type 2 diabetes mellitus with renal complication [facility])  Assessment & Plan    Lab Results   Component Value Date    HGBA1C 6 3 (H) 03/01/2022   · Hold Metformin,   · SSI q 6 while NPO  · Fingersticks q 6      VTE Pharmacologic Prophylaxis:   VTE Score: 5 High Risk (Score >/= 5) - Pharmacological DVT Prophylaxis Contraindicated  Sequential Compression Devices Ordered  Mechanical VTE Prophylaxis in Place: Yes    Patient Centered Rounds: I have performed bedside rounds with nursing staff today  Discussions with Specialists or Other Care Team Provider:  Nursing, surgical team    Education and Discussions with Family / Patient: Updated  (wife) at bedside      Current Length of Stay: 0 day(s)    Current Patient Status: Inpatient     Discharge Plan / Estimated Discharge Date: Anticipate discharge in 48 hrs to home  Code Status: Level 1 - Full Code      Subjective:   Patient reported pain habits sided scored 0/10 with pain medication  Patient denied nausea and vomiting, and diarrhea  Patient reports order urine appropriate  Patient reported he was feeling very tired given he did not sleep overnight  Objective:     Vitals:   Temp (24hrs), Av 5 °F (36 9 °C), Min:97 6 °F (36 4 °C), Max:99 4 °F (37 4 °C)    Temp:  [97 6 °F (36 4 °C)-99 4 °F (37 4 °C)] 99 4 °F (37 4 °C)  HR:  [51-82] 78  Resp:  [16-22] 18  BP: (113-173)/(54-86) 141/71  SpO2:  [94 %-100 %] 94 %  Body mass index is 42 15 kg/m²  Input and Output Summary (last 24 hours): Intake/Output Summary (Last 24 hours) at 2022 1540  Last data filed at 2022 0801  Gross per 24 hour   Intake 1100 ml   Output --   Net 1100 ml       Physical Exam:     Physical Exam  Constitutional:       General: He is not in acute distress  Appearance: He is obese  He is ill-appearing  HENT:      Head: Normocephalic and atraumatic  Mouth/Throat:      Mouth: Mucous membranes are moist    Eyes:      General: No scleral icterus  Right eye: No discharge  Left eye: No discharge  Extraocular Movements: Extraocular movements intact  Cardiovascular:      Rate and Rhythm: Normal rate and regular rhythm  Pulses: Normal pulses  Heart sounds: Normal heart sounds  No murmur heard  No gallop  Pulmonary:      Effort: Pulmonary effort is normal  No respiratory distress  Breath sounds: Normal breath sounds  No stridor  No wheezing or rales  Abdominal:      General: Abdomen is flat  Bowel sounds are normal  There is no distension  Palpations: Abdomen is soft  There is no mass  Tenderness: There is abdominal tenderness (RUQ)  There is right CVA tenderness and left CVA tenderness  There is no guarding or rebound  Musculoskeletal:         General: Tenderness (mid thoracic and lumbar spine) present  No deformity or signs of injury  Right lower leg: Edema present  Left lower leg: Edema present  Skin:     General: Skin is warm and dry  Coloration: Skin is not jaundiced  Findings: No bruising, erythema or rash  Neurological:      General: No focal deficit present  Mental Status: He is alert and oriented to person, place, and time  Psychiatric:         Mood and Affect: Mood normal       Comments: Pt very confused on exam, improved with one hour          Additional Data:     Labs:  Results from last 7 days   Lab Units 08/30/22  0600   WBC Thousand/uL 11 91*   HEMOGLOBIN g/dL 11 3*   HEMATOCRIT % 37 0   PLATELETS Thousands/uL 192   NEUTROS PCT % 82*   LYMPHS PCT % 6*   MONOS PCT % 11   EOS PCT % 0     Results from last 7 days   Lab Units 08/30/22  0600   SODIUM mmol/L 141   POTASSIUM mmol/L 4 3   CHLORIDE mmol/L 104   CO2 mmol/L 26   BUN mg/dL 27*   CREATININE mg/dL 1 59*   ANION GAP mmol/L 11   CALCIUM mg/dL 9 7   ALBUMIN g/dL 4 4   TOTAL BILIRUBIN mg/dL 2 30*   ALK PHOS U/L 77   ALT U/L 13   AST U/L 18   GLUCOSE RANDOM mg/dL 171*     Results from last 7 days   Lab Units 08/30/22  0600   INR  2 49*             Results from last 7 days   Lab Units 08/29/22  2225   LACTIC ACID mmol/L 1 2       Imaging: Reviewed radiology reports from this admission including: CTA, RUQ U/S, HIDA scan    Recent Cultures (last 7 days):     Results from last 7 days   Lab Units 08/30/22  0601 08/30/22  0600   BLOOD CULTURE  Received in Microbiology Lab  Culture in Progress  Received in Microbiology Lab  Culture in Progress         Lines/Drains:  Invasive Devices  Report    Peripheral Intravenous Line  Duration           Peripheral IV 08/29/22 Left Forearm <1 day    Peripheral IV 08/29/22 Right Arm <1 day                Telemetry:        Last 24 Hours Medication List:   Current Facility-Administered Medications   Medication Dose Route Frequency Provider Last Rate    acetaminophen  650 mg Oral Q6H PRN Maricruz Beards, PA-C      atorvastatin  40 mg Oral After [employer], PA-C      gabapentin  100 mg Oral Daily Maricruz Beards, PA-C      HYDROcodone-acetaminophen  1 tablet Oral Q6H PRN Maricruz Beards, PA-C      HYDROmorphone  0 5 mg Intravenous Q6H PRN Maricruz Beards, PA-C      HYDROmorphone  0 2 mg Intravenous Q4H PRN Maricruz Beards, PA-C      insulin lispro  2-12 Units Subcutaneous Q6H Zora Pena MD      iron polysaccharides  150 mg Oral Daily Maricruz Beards, PA-C      pantoprazole  40 mg Oral Early Morning Maricruz Beards, PA-C      phytonadione  2 5 mg Oral Once Zora Pena MD      potassium chloride  20 mEq Oral BID Maricruz Beards, PA-C      sotalol  80 mg Oral Q12H [facility], PA-C      torsemide  20 mg Oral QPM Maricruz Beards, PA-C      torsemide  40 mg Oral Daily Maricruz Beards, PA-C          Today, Patient Was Seen By: Zora Pena MD    ** Please Note: This note has been constructed using a voice recognition system   **

## 2022-08-30 NOTE — ED PROVIDER NOTES
History  Chief Complaint   Patient presents with    Chest Pain     Generalized chest pain x 2 5 hrs  Received nitro sl  x 1 and [facility]      Patient is an 67-year-old male who presents to the emergency department via EMS with intense lower chest discomfort  Heaviness started while driving at 25:54  He had consumed ice cream shortly prior to this  This improved a couple of hours later from a 10/10 to a 6/10 but persisted and felt unlike anything he has ever experienced prompting call for ambulance  He was appreciated to be pale and diaphoretic  He was given aspirin 325 mg and 1 sublingual nitroglycerin with improvement of pain from a 6 to a 5  Blood pressure following this was appreciated to be low in the 90s  Patient continues to describe heaviness and gestures to the lower chest/epigastric region  He denies radiation of pain, associated dyspnea, nausea, vomiting, lightheadedness, abdominal discomfort or abnormal sensation in the legs  Past medical history significant for hyperlipidemia, aortic valve replacement following endocarditis and placement of pacemaker/defibrillator after experiencing V-tach  No known history of CAD/mi  He does have CHF  No known aortic aneurysm  Cardiology note from this month reviewed  Heart failure appreciated to be under better control following prior increase of torsemide to 60 mg daily  No recent weight gain or increased LE swelling  Echo reveals cardiomegaly with some enlargement of all of the chambers, good function of the aortic valve and trace regurgitation of the others  EF 55%  Due to degree of distress and ill appearance briefly addressed advanced directive/code status  Patient is full code  Prior to Admission Medications   Prescriptions Last Dose Informant Patient Reported? Taking?    B Complex-C (SUPER B COMPLEX PO) 8/29/2022 at Unknown time Self Yes Yes   Sig: Take 1 capsule by mouth daily    HYDROcodone-acetaminophen (NORCO) 5-325 mg per tablet 2022 at Unknown time Self Yes Yes   Sig: Take 1 tablet by mouth every 6 (six) hours as needed   Iron Combinations (NIFEREX) TABS 2022 at Unknown time Self Yes Yes   Sig: Take 1 tablet by mouth in the morning   Multiple Vitamin (MULTIVITAMINS PO) 2022 at Unknown time Self Yes Yes   Sig: Take 1 tablet by mouth daily   acetaminophen (TYLENOL) 325 mg tablet 2022 at Unknown time Self No Yes   Sig: Take 2 tablets by mouth every 6 (six) hours as needed for mild pain or fever   albuterol (PROVENTIL HFA,VENTOLIN HFA) 90 mcg/act inhaler 2022 at Unknown time Self Yes Yes   atorvastatin (LIPITOR) 40 mg tablet 2022 at Unknown time Self Yes Yes   Sig: Take 40 mg by mouth daily after dinner Atorvastatin Calcium 40 MG Oral Tablet Take 1 tablet daily  Refills: 0  Active    colchicine (COLCRYS) 0 6 mg tablet 2022 at Unknown time Self Yes Yes   Sig: Take 0 6 mg by mouth as needed for muscle/joint pain   fluticasone (FLONASE) 50 mcg/act nasal spray 2022 at Unknown time Self Yes Yes   Si sprays as needed Shake liquid and spray    gabapentin (NEURONTIN) 100 mg capsule 2022 at Unknown time Self Yes Yes   Sig: Take 100 mg by mouth daily   metFORMIN (GLUCOPHAGE) 1000 MG tablet 2022 at Unknown time Self Yes Yes   Sig: Take 1,000 mg by mouth daily after dinner MetFORMIN HCl 1000 MG (MOD) TB24 Take one tablet daily  Refills: 0  Active    mupirocin (BACTROBAN) 2 % ointment 2022 at Unknown time Self Yes Yes   Sig: as needed    omeprazole (PriLOSEC) 20 mg delayed release capsule 2022 at Unknown time Self Yes Yes   Sig: Omeprazole 20 MG Oral Tablet Delayed Release Take 1 tablet daily  Refills: 0  Active   polyethylene glycol (GLYCOLAX) 17 GM/SCOOP powder 2022 at Unknown time Self Yes Yes   Sig: Take 17 g by mouth 2 (two) times a day   potassium chloride (K-DUR,KLOR-CON) 20 mEq tablet 2022 at Unknown time Self Yes Yes   Sig: Take 20 mEq by mouth 2 (two) times a day Potassium Chloride Jesusita ER 20 MEQ Oral Tablet Extended Release Take 1 tablet daily  Refills: 0  Active    sotalol (BETAPACE) 80 mg tablet 8/29/2022 at Unknown time Self Yes Yes   Sig: Take 80 mg by mouth every 12 (twelve) hours    torsemide (DEMADEX) 20 mg tablet 8/29/2022 at Unknown time Self No Yes   Sig: TAKE 3 TABLETS(60 MG TOTAL) BY MOUTH EVERY DAY  CAN TAKE 40 MG IN THE AM AND 20 MG IN THE AFTERNOON   warfarin (COUMADIN) 4 mg tablet 8/29/2022 at Unknown time Self No Yes   Sig: Take 1 tablet (4 mg total) by mouth 2 (two) times a week Patient take 4mg everyday but Tuesday and Friday     Patient taking differently: Take 4 mg by mouth 2 (two) times a week 4 or 6 mg as directed   warfarin (COUMADIN) 6 mg tablet 8/29/2022 at Unknown time Self Yes Yes   Sig: Take 6 mg by mouth The patient takes on Tuesday and Fridays      Facility-Administered Medications: None       Past Medical History:   Diagnosis Date    Anemia     Arthritis     Atrial fibrillation ([facility] [address] )     Basal cell carcinoma 03/22/2022    Tip of Nose    CHF (congestive heart failure) (HCC)     Diabetes mellitus ([facility] [address] )     Niddm    DVT (deep vein thrombosis) in pregnancy 1966    not in pregnancy    DVT (deep venous thrombosis) ([facility] [address] ) 1966    Dyslipidemia     GERD (gastroesophageal reflux disease)     Hyperlipidemia     Hypertension     Irregular heart beat     Afib    Morbid obesity due to excess calories ([address] )     Resolved 9/2/2014     Pulmonary embolism (HCC)     Sepsis ([facility] [address] )     Squamous cell skin cancer 07/30/2020    Left posterior scalp    Visual impairment        Past Surgical History:   Procedure Laterality Date    AORTIC VALVE REPLACEMENT      CARDIAC DEFIBRILLATOR PLACEMENT  04/2014   [facility] CARDIAC SURGERY  02/2014    AVR    COLONOSCOPY      INSERT / REPLACE / REMOVE PACEMAKER      JOINT REPLACEMENT Left     LTKR    MOHS SURGERY  07/30/2020    Left posterior scalp, Dr Robina Bobby  04/18/2022    [facility] Tip of Nose-   Seven    WY LIGATE/STRIP LONG SAPH VEIN BELW SEP-FEM JUNC Right 8/17/2018    Procedure: LEG PERFORATED INJECTION SCLEROTHERAPY;  Surgeon: Judson Mathias MD;  [facility]: [facility];  Service: Vascular    REPLACEMENT TOTAL KNEE Right     TOTAL KNEE ARTHROPLASTY Left     VASCULAR SURGERY      VENA CAVA FILTER PLACEMENT      Interruption inferior vena cava, Josue filter, placement    WISDOM TOOTH EXTRACTION         Family History   Problem Relation Age of Onset    Arthritis Mother     Stroke Mother     Arthritis Father     Cancer Father     Arthritis Daughter      I have reviewed and agree with the history as documented  E-Cigarette/Vaping    E-Cigarette Use Never User      E-Cigarette/Vaping Substances    Nicotine No     THC No     CBD No     Other No     Unknown No      Social History     Tobacco Use    Smoking status: Never Smoker    Smokeless tobacco: Never Used   Vaping Use    Vaping Use: Never used   Substance Use Topics    Alcohol use: Not Currently    Drug use: No       Review of Systems   Constitutional: Negative for fever  Respiratory: Negative for shortness of breath  Cardiovascular: Negative for palpitations  All other systems reviewed and are negative  Physical Exam  Physical Exam  Vitals and nursing note reviewed  Constitutional:       General: He is in acute distress (Very uncomfortable appearing with pallor)  Appearance: He is well-developed  He is obese  He is ill-appearing and diaphoretic  Eyes:      Extraocular Movements: Extraocular movements intact  Comments: No icterus   Cardiovascular:      Rate and Rhythm: Normal rate and regular rhythm  Heart sounds: Normal heart sounds  Pulmonary:      Effort: Pulmonary effort is normal  No accessory muscle usage or respiratory distress  Breath sounds: No decreased breath sounds, wheezing, rhonchi or rales  Chest:      Chest wall: No tenderness  Comments: Well-healed sternotomy  Pacemaker appreciated in the left upper chest  Abdominal:      Comments: Abdomen protuberant  Normoactive bowel sounds  Patient denies tenderness though does appear to grimace slightly upon palpation in the epigastrium  No CVAT  Musculoskeletal:      Right lower leg: No tenderness  Edema ( trace) present  Left lower leg: No tenderness  Edema ( trace) present  Comments: Compression stockings in place  Plus two PT pulses  Neurological:      General: No focal deficit present  Mental Status: He is alert     Psychiatric:         Mood and Affect: Mood normal          Behavior: Behavior normal          Vital Signs  ED Triage Vitals   Temperature Pulse Respirations Blood Pressure SpO2   08/29/22 2010 08/29/22 1943 08/29/22 1943 08/29/22 1943 08/29/22 1943   97 6 °F (36 4 °C) 61 22 131/65 97 %      Temp Source Heart Rate Source Patient Position - Orthostatic VS BP Location FiO2 (%)   08/29/22 2010 08/29/22 1943 08/29/22 1943 08/29/22 1943 --   Oral Monitor Sitting Right arm       Pain Score       08/29/22 1943       10 - Worst Possible Pain           Vitals:    08/30/22 0400 08/30/22 0600 08/30/22 0700 08/30/22 1613   BP: 156/72 141/63 141/71 144/68   Pulse: 80 82 78 68   Patient Position - Orthostatic VS:   Lying          Visual Acuity      ED Medications  Medications   acetaminophen (TYLENOL) tablet 650 mg (650 mg Oral Given 8/30/22 0857)   atorvastatin (LIPITOR) tablet 40 mg (0 mg Oral Hold 8/30/22 1735)   gabapentin (NEURONTIN) capsule 100 mg (100 mg Oral Given 8/30/22 0857)   HYDROcodone-acetaminophen (NORCO) 5-325 mg per tablet 1 tablet (has no administration in time range)   iron polysaccharides (FERREX) capsule 150 mg (150 mg Oral Given 8/30/22 0858)   pantoprazole (PROTONIX) EC tablet 40 mg (40 mg Oral Given 8/30/22 0607)   potassium chloride (K-DUR,KLOR-CON) CR tablet 20 mEq (20 mEq Oral Given 8/30/22 1736)   sotalol (BETAPACE) tablet 80 mg (80 mg Oral Given 8/30/22 1619)   torsemide (DEMADEX) tablet 40 mg (40 mg Oral Given 8/30/22 0857)   torsemide (DEMADEX) tablet 20 mg (20 mg Oral Given 8/30/22 1736)   HYDROmorphone HCl (DILAUDID) injection 0 2 mg (0 2 mg Intravenous Given 8/30/22 2001)   HYDROmorphone (DILAUDID) injection 0 5 mg (has no administration in time range)   insulin lispro (HumaLOG) 100 units/mL subcutaneous injection 2-12 Units (2 Units Subcutaneous Not Given 8/30/22 1724)   morphine injection 4 mg (4 mg Intravenous Given 8/29/22 2005)   nitroglycerin (NITROSTAT) SL tablet 0 4 mg (0 4 mg Sublingual Given 8/29/22 2002)   ondansetron (ZOFRAN) injection 4 mg (4 mg Intravenous Given 8/29/22 2006)   nitroglycerin (NITROSTAT) SL tablet 0 4 mg (0 4 mg Sublingual Given 8/29/22 2021)   fentanyl citrate (PF) 100 MCG/2ML 100 mcg (100 mcg Intravenous Given 8/29/22 2039)   sodium chloride 0 9 % bolus 500 mL (0 mL Intravenous Stopped 8/29/22 2158)   iohexol (OMNIPAQUE) 350 MG/ML injection (MULTI-DOSE) 100 mL (100 mL Intravenous Given 8/29/22 2115)   fentanyl citrate (PF) 100 MCG/2ML 50 mcg (50 mcg Intravenous Given 8/29/22 2211)   fentanyl citrate (PF) 100 MCG/2ML 50 mcg (50 mcg Intravenous Given 8/29/22 2321)   HYDROmorphone (DILAUDID) injection 0 5 mg (0 5 mg Intravenous Given 8/30/22 0033)   warfarin (COUMADIN) tablet 6 mg (6 mg Oral Given 8/30/22 0859)   piperacillin-tazobactam (ZOSYN) 3 375 g in sodium chloride 0 9 % 100 mL IVPB (0 g Intravenous Stopped 8/30/22 0801)   morphine injection 6 mg (6 mg Intravenous Given 8/30/22 1400)   phytonadione (MEPHYTON) tablet 2 5 mg (2 5 mg Oral Given 8/30/22 1620)       Diagnostic Studies  Results Reviewed     Procedure Component Value Units Date/Time    Blood culture [[id number]] Collected: 08/30/22 0600    Lab Status: Preliminary result Specimen: Blood from Arm, Right Updated: 08/30/22 1103     Blood Culture Received in Microbiology Lab  Culture in Progress      Blood culture [[id number]] Collected: 08/30/22 0601    Lab Status: Preliminary result Specimen: Blood from Arm, Left Updated: 08/30/22 1103     Blood Culture Received in Microbiology Lab  Culture in Progress  COVID/FLU/RSV [[id number]]  (Normal) Collected: 08/30/22 0601    Lab Status: Final result Specimen: Nares from Nose Updated: 08/30/22 0657     SARS-CoV-2 Negative     INFLUENZA A PCR Negative     INFLUENZA B PCR Negative     RSV PCR Negative    Narrative:      FOR PEDIATRIC PATIENTS - copy/paste COVID Guidelines URL to browser: [email or web address]  [facility]    SARS-CoV-2 assay is a Nucleic Acid Amplification assay intended for the  qualitative detection of nucleic acid from SARS-CoV-2 in nasopharyngeal  swabs  Results are for the presumptive identification of SARS-CoV-2 RNA  Positive results are indicative of infection with SARS-CoV-2, the virus  causing COVID-19, but do not rule out bacterial infection or co-infection  with other viruses  Laboratories within the [place] and its  territories are required to report all positive results to the appropriate  public health authorities  Negative results do not preclude SARS-CoV-2  infection and should not be used as the sole basis for treatment or other  patient management decisions  Negative results must be combined with  clinical observations, patient history, and epidemiological information  This test has not been FDA cleared or approved  This test has been authorized by FDA under an Emergency Use Authorization  (EUA)  This test is only authorized for the duration of time the  declaration that circumstances exist justifying the authorization of the  emergency use of an in vitro diagnostic tests for detection of SARS-CoV-2  virus and/or diagnosis of COVID-19 infection under section 564(b)(1) of  the Act, 21 U  S C  850RBT-4(I)(9), unless the authorization is terminated  or revoked sooner  The test has been validated but independent review by FDA  and CLIA is pending      Test performed using [employer] GeneXpert: This RT-PCR assay targets N2,  a region unique to SARS-CoV-2  A conserved region in the E-gene was chosen  for pan-Sarbecovirus detection which includes SARS-CoV-2      Comprehensive metabolic panel [[id number]]  (Abnormal) Collected: 08/30/22 0600    Lab Status: Final result Specimen: Blood from Arm, Right Updated: 08/30/22 9066     Sodium 141 mmol/L      Potassium 4 3 mmol/L      Chloride 104 mmol/L      CO2 26 mmol/L      ANION GAP 11 mmol/L      BUN 27 mg/dL      Creatinine 1 59 mg/dL      Glucose 171 mg/dL      Glucose, Fasting 171 mg/dL      Calcium 9 7 mg/dL      AST 18 U/L      ALT 13 U/L      Alkaline Phosphatase 77 U/L      Total Protein 7 7 g/dL      Albumin 4 4 g/dL      Total Bilirubin 2 30 mg/dL      eGFR 38 ml/min/1 73sq m     Narrative:      [facility] guidelines for Chronic Kidney Disease (CKD):     Stage 1 with normal or high GFR (GFR > 90 mL/min/1 73 square meters)    Stage 2 Mild CKD (GFR = 60-89 mL/min/1 73 square meters)    Stage 3A Moderate CKD (GFR = 45-59 mL/min/1 73 square meters)    Stage 3B Moderate CKD (GFR = 30-44 mL/min/1 73 square meters)    Stage 4 Severe CKD (GFR = 15-29 mL/min/1 73 square meters)    Stage 5 End Stage CKD (GFR <15 mL/min/1 73 square meters)  Note: GFR calculation is accurate only with a steady state creatinine    Protime-INR [[id number]]  (Abnormal) Collected: 08/30/22 0600    Lab Status: Final result Specimen: Blood from Arm, Right Updated: 08/30/22 0624     Protime 27 2 seconds      INR 2 49    CBC and differential [[id number]]  (Abnormal) Collected: 08/30/22 0600    Lab Status: Final result Specimen: Blood from Arm, Right Updated: 08/30/22 0614     WBC 11 91 Thousand/uL      RBC 4 22 Million/uL      Hemoglobin 11 3 g/dL      Hematocrit 37 0 %      MCV 88 fL      MCH 26 8 pg      MCHC 30 5 g/dL      RDW 16 6 %      MPV 10 4 fL      Platelets 717 Thousands/uL      nRBC 0 /100 WBCs      Neutrophils Relative 82 %      Immat GRANS % 1 %      Lymphocytes Relative 6 %      Monocytes Relative 11 %      Eosinophils Relative 0 %      Basophils Relative 0 %      Neutrophils Absolute 9 89 Thousands/µL      Immature Grans Absolute 0 06 Thousand/uL      Lymphocytes Absolute 0 65 Thousands/µL      Monocytes Absolute 1 25 Thousand/µL      Eosinophils Absolute 0 03 Thousand/µL      Basophils Absolute 0 03 Thousands/µL     HS Troponin I 4hr [[id number]]  (Normal) Collected: 08/29/22 2305    Lab Status: Final result Specimen: Blood Updated: 08/29/22 2337     hs TnI 4hr 9 ng/L      Delta 4hr hsTnI 1 ng/L     Lactic acid, plasma [[id number]]  (Normal) Collected: 08/29/22 2225    Lab Status: Final result Specimen: Blood from Arm, Right Updated: 08/29/22 2249     LACTIC ACID 1 2 mmol/L     Narrative:      Result may be elevated if tourniquet was used during collection      HS Troponin I 2hr [[id number]]  (Normal) Collected: 08/29/22 2157    Lab Status: Final result Specimen: Blood Updated: 08/29/22 2229     hs TnI 2hr 9 ng/L      Delta 2hr hsTnI 1 ng/L     Protime-INR [[id number]]  (Abnormal) Collected: 08/29/22 1952    Lab Status: Final result Specimen: Blood from Arm, Right Updated: 08/29/22 2130     Protime 28 0 seconds      INR 2 59    APTT [[id number]]  (Abnormal) Collected: 08/29/22 1952    Lab Status: Final result Specimen: Blood from Arm, Right Updated: 08/29/22 2130     PTT 45 seconds     Lipase [[id number]]  (Normal) Collected: 08/29/22 1952    Lab Status: Final result Specimen: Blood from Arm, Right Updated: 08/29/22 2105     Lipase 53 u/L     B-Type Natriuretic Peptide(BNP), AN, CA, EA Campuses Only [[id number]]  (Abnormal) Collected: 08/29/22 1952    Lab Status: Final result Specimen: Blood from Arm, Right Updated: 08/29/22 2101      pg/mL     HS Troponin 0hr (reflex protocol) [[id number]]  (Normal) Collected: 08/29/22 1952    Lab Status: Final result Specimen: Blood from Arm, Right Updated: 08/29/22 2022     hs TnI 0hr 8 ng/L     Comprehensive metabolic panel [[id number]]  (Abnormal) Collected: 08/29/22 1952    Lab Status: Final result Specimen: Blood from Arm, Right Updated: 08/29/22 2015     Sodium 139 mmol/L      Potassium 3 9 mmol/L      Chloride 103 mmol/L      CO2 26 mmol/L      ANION GAP 10 mmol/L      BUN 33 mg/dL      Creatinine 1 63 mg/dL      Glucose 166 mg/dL      Calcium 9 5 mg/dL      AST 17 U/L      ALT 11 U/L      Alkaline Phosphatase 78 U/L      Total Protein 7 2 g/dL      Albumin 4 2 g/dL      Total Bilirubin 1 99 mg/dL      eGFR 37 ml/min/1 73sq m     Narrative:      [place] guidelines for Chronic Kidney Disease (CKD):     Stage 1 with normal or high GFR (GFR > 90 mL/min/1 73 square meters)    Stage 2 Mild CKD (GFR = 60-89 mL/min/1 73 square meters)    Stage 3A Moderate CKD (GFR = 45-59 mL/min/1 73 square meters)    Stage 3B Moderate CKD (GFR = 30-44 mL/min/1 73 square meters)    Stage 4 Severe CKD (GFR = 15-29 mL/min/1 73 square meters)    Stage 5 End Stage CKD (GFR <15 mL/min/1 73 square meters)  Note: GFR calculation is accurate only with a steady state creatinine    CBC and differential [[id number]]  (Abnormal) Collected: 08/29/22 1952    Lab Status: Final result Specimen: Blood from Arm, Right Updated: 08/29/22 1957     WBC 5 50 Thousand/uL      RBC 3 84 Million/uL      Hemoglobin 10 4 g/dL      Hematocrit 33 4 %      MCV 87 fL      MCH 27 1 pg      MCHC 31 1 g/dL      RDW 16 5 %      MPV 9 9 fL      Platelets 596 Thousands/uL      nRBC 0 /100 WBCs      Neutrophils Relative 64 %      Immat GRANS % 0 %      Lymphocytes Relative 18 %      Monocytes Relative 12 %      Eosinophils Relative 5 %      Basophils Relative 1 %      Neutrophils Absolute 3 53 Thousands/µL      Immature Grans Absolute 0 01 Thousand/uL      Lymphocytes Absolute 1 00 Thousands/µL      Monocytes Absolute 0 68 Thousand/µL      Eosinophils Absolute 0 25 Thousand/µL      Basophils Absolute 0 03 Thousands/µL                  NM hepatobiliary   Final Result by Aliza Huffman MD (08/30 4895)      1  Nonvisualization of the gallbladder despite morphine administration  Findings consistent with acute cholecystitis in the appropriate clinical setting, see discussion above  2   Patent common bile duct  The study was marked in [facility] for immediate notification  Workstation performed: [id number]         US right upper quadrant   Final Result by Julissa Benson MD (38/10 1557)      1  Cholelithiasis with mild wall thickening and trace pericholecystic fluid  No evidence of sonographic Patel's sign  The findings are equivocal for cholecystitis  Correlate with LFTs  HIDA scan may be considered as clinically appropriate  2   Hepatomegaly  3   Trace perihepatic fluid  Workstation performed: [id number]         CTA dissection protocol chest/abdomen/pelvis   Final Result by Susie Mata DO (08/29 2145)      No aortic aneurysm or dissection  No acute inflammatory process  Cholelithiasis without evidence of acute cholecystitis  Additional nonacute findings, as described  Workstation performed: [id number]         XR chest 1 view portable   Final Result by Phil Dockery MD (08/30 0805)   Stable cardiomegaly and mild central congestion        Workstation performed: [id number]         IR cholecystostomy tube placement    (Results Pending)              Procedures  ECG 12 Lead Documentation Only    Date/Time: 8/29/2022 10:38 PM  Performed by: Mandi Bauer MD  Authorized by: Mandi Bauer MD     Patient location:  ED  Previous ECG:     Previous ECG:  Compared to current  Rate:     ECG rate:  56    ECG rate assessment: bradycardic    Rhythm:     Rhythm: paced    Pacing:     Capture:  Intermittent  QRS:     QRS axis:  Normal    QRS intervals:  Normal  Conduction:     Conduction: normal    T waves:     T waves: inverted      Inverted:  V6  ECG 12 Lead Documentation Only    Date/Time: 8/29/2022 10:40 PM  Performed by: Herminio Bernal MD  Authorized by: Herminio Bernal MD     ECG reviewed by me, the ED Provider: yes    Patient location:  ED  Previous ECG:     Previous ECG:  Compared to current    Comparison ECG info:  5/7/22-sinus rhythm appreciated on prior    Similarity:  Changes noted  Rate:     ECG rate:  62    ECG rate assessment: normal    Rhythm:     Rhythm: sinus rhythm and paced    Pacing:     Capture:  Intermittent             ED Course  ED Course as of 08/30/22 2106   Mon Aug 29, 2022   2015 Received morphine and sublingual nitroglycerin with relief  Pain re intensified to a 10/10  Will try additional sublingual nitroglycerin   And and reassess   2101 Remains uncomfortable  He continued to endorse 10/10 pain  He did have some mild sedation with this and O2 sats dipped to the 80s  Nasal cannula at 4 L applied  I did speak with CT tech; patient will be next to CT scan  2124 Much more comfortable at this time  He does not appreciate any pain when laying still  He continues to have mild tenderness upon palpation in the epigastric  I do not appreciate dissection on his CT scan  He does have a large quantity of gallstones  Radiology read pending  2218 Pain returned  Additional fentanyl ordered  Second troponin is in process  2316 Repeat troponin not significantly changed nor elevated  Lactic acid is normal at 1 2  Ultrasound currently at the bedside  2354 Ultrasound images reveal wall thickening and pericholecystic fluid as well as nonmobile gallstones  Radiologist currently reviewing images  I did send a message to the surgical resident with concern for early cholecystitis  Tue Aug 30, 2022   0021 Patient feeling well at this time  Now that blood pressure is no longer low have ordered hydromorphone for next dose of analgesic as needed for longer duration of coverage    Care is being transferred to   Jose Tran  Surgical resident aware of patient and will evaluate  HEART Risk Score    Flowsheet Row Most Recent Value   Heart Score Risk Calculator    History 2 Filed at: 08/29/2022 2236   ECG 1 Filed at: 08/29/2022 2236   Age 2 Filed at: 08/29/2022 2236   Risk Factors 2 Filed at: 08/29/2022 2236   Troponin 0 Filed at: 08/29/2022 2236   HEART Score 7 Filed at: 08/29/2022 2236                        SBIRT 22yo+    Flowsheet Row Most Recent Value   SBIRT (25 yo +)    In order to provide better care to our patients, we are screening all of our patients for alcohol and drug use  Would it be okay to ask you these screening questions? Yes Filed at: 08/30/2022 8818   Initial Alcohol Screen: US AUDIT-C     1  How often do you have a drink containing alcohol? 0 Filed at: 08/30/2022 0436   2  How many drinks containing alcohol do you have on a typical day you are drinking? 0 Filed at: 08/30/2022 0436   3a  Male UNDER 65: How often do you have five or more drinks on one occasion? 0 Filed at: 08/30/2022 0436   3b  FEMALE Any Age, or MALE 65+: How often do you have 4 or more drinks on one occassion? 0 Filed at: 08/30/2022 0436   Audit-C Score 0 Filed at: 08/30/2022 4600   KRISTIAN: How many times in the past year have you    Used an illegal drug or used a prescription medication for non-medical reasons?  Never Filed at: 08/30/2022 8875                    MDM    Disposition  Final diagnoses:   Chest pain     Time reflects when diagnosis was documented in both MDM as applicable and the Disposition within this note     Time User Action Codes Description Comment    8/30/2022  3:14 AM Shantel Miles Add [R07 9] Chest pain     8/30/2022  3:23 PM Darrick Escobar Add [K81 0] Acute cholecystitis       ED Disposition     ED Disposition   Admit    Condition   Stable    Date/Time   Tue Aug 30, 2022  3:14 AM    Comment   --         Follow-up Information    None         Current Discharge Medication List      CONTINUE these medications which have NOT CHANGED    Details   acetaminophen (TYLENOL) 325 mg tablet Take 2 tablets by mouth every 6 (six) hours as needed for mild pain or fever  Qty: 30 tablet, Refills: 0      albuterol (PROVENTIL HFA,VENTOLIN HFA) 90 mcg/act inhaler     Comments: Substitution to a formulary equivalent within the same pharmaceutical class is authorized  atorvastatin (LIPITOR) 40 mg tablet Take 40 mg by mouth daily after dinner Atorvastatin Calcium 40 MG Oral Tablet Take 1 tablet daily  Refills: 0  Active       B Complex-C (SUPER B COMPLEX PO) Take 1 capsule by mouth daily       colchicine (COLCRYS) 0 6 mg tablet Take 0 6 mg by mouth as needed for muscle/joint pain      fluticasone (FLONASE) 50 mcg/act nasal spray 2 sprays as needed Shake liquid and spray       gabapentin (NEURONTIN) 100 mg capsule Take 100 mg by mouth daily      HYDROcodone-acetaminophen (NORCO) 5-325 mg per tablet Take 1 tablet by mouth every 6 (six) hours as needed      Iron Combinations (NIFEREX) TABS Take 1 tablet by mouth in the morning  Refills: 5      metFORMIN (GLUCOPHAGE) 1000 MG tablet Take 1,000 mg by mouth daily after dinner MetFORMIN HCl 1000 MG (MOD) TB24 Take one tablet daily  Refills: 0  Active       Multiple Vitamin (MULTIVITAMINS PO) Take 1 tablet by mouth daily      mupirocin (BACTROBAN) 2 % ointment as needed       omeprazole (PriLOSEC) 20 mg delayed release capsule Omeprazole 20 MG Oral Tablet Delayed Release Take 1 tablet daily  Refills: 0  Active      polyethylene glycol (GLYCOLAX) 17 GM/SCOOP powder Take 17 g by mouth 2 (two) times a day      potassium chloride (K-DUR,KLOR-CON) 20 mEq tablet Take 20 mEq by mouth 2 (two) times a day Potassium Chloride Jesusita ER 20 MEQ Oral Tablet Extended Release Take 1 tablet daily  Refills: 0  Active       sotalol (BETAPACE) 80 mg tablet Take 80 mg by mouth every 12 (twelve) hours   Refills: 2      torsemide (DEMADEX) 20 mg tablet TAKE 3 TABLETS(60 MG TOTAL) BY MOUTH EVERY DAY   CAN TAKE 40 MG IN THE AM AND 20 MG IN THE AFTERNOON  Qty: 270 tablet, Refills: 3    Comments: **Patient requests 90 days supply**  Associated Diagnoses: SOB (shortness of breath); Diastolic congestive heart failure, unspecified [facility] )      ! ! warfarin (COUMADIN) 4 mg tablet Take 1 tablet (4 mg total) by mouth 2 (two) times a week Patient take 4mg everyday but Tuesday and Friday  Qty: 90 tablet, Refills: 0    Associated Diagnoses: Anticoagulated      !! warfarin (COUMADIN) 6 mg tablet Take 6 mg by mouth The patient takes on Tuesday and Fridays       ! ! - Potential duplicate medications found  Please discuss with provider  No discharge procedures on file      PDMP Review     None          ED Provider  Electronically Signed by           Racehll Dumont MD  08/30/22 1848

## 2022-08-30 NOTE — ASSESSMENT & PLAN NOTE
Lab Results   Component Value Date    HGBA1C 6 3 (H) 03/01/2022   · Hold Metformin, start SSI  · Fingersticks

## 2022-08-30 NOTE — PLAN OF CARE
Problem: Potential for Falls  Goal: Patient will remain free of falls  Description: INTERVENTIONS:  - Educate patient/family on patient safety including physical limitations  - Instruct patient to call for assistance with activity   - Consult OT/PT to assist with strengthening/mobility   - Keep Call bell within reach  - Keep bed low and locked with side rails adjusted as appropriate  - Keep care items and personal belongings within reach  - Initiate and maintain comfort rounds  - Make Fall Risk Sign visible to staff  - Offer Toileting every 2 Hours, in advance of need  - Initiate/Maintain bed alarm  - Obtain necessary fall risk management equipment: Alarms  - Apply yellow socks and bracelet for high fall risk patients  - Consider moving patient to room near nurses station  Outcome: Progressing     Problem: MOBILITY - ADULT  Goal: Maintain or return to baseline ADL function  Description: INTERVENTIONS:  -  Assess patient's ability to carry out ADLs; assess patient's baseline for ADL function and identify physical deficits which impact ability to perform ADLs (bathing, care of mouth/teeth, toileting, grooming, dressing, etc )  - Assess/evaluate cause of self-care deficits   - Assess range of motion  - Assess patient's mobility; develop plan if impaired  - Assess patient's need for assistive devices and provide as appropriate  - Encourage maximum independence but intervene and supervise when necessary  - Involve family in performance of ADLs  - Assess for home care needs following discharge   - Consider OT consult to assist with ADL evaluation and planning for discharge  - Provide patient education as appropriate  Outcome: Progressing  Goal: Maintains/Returns to pre admission functional level  Description: INTERVENTIONS:  - Perform BMAT or MOVE assessment daily    - Set and communicate daily mobility goal to care team and patient/family/caregiver     - Collaborate with rehabilitation services on mobility goals if consulted  - Out of bed to chair 3 times a day   - Out of bed for meals 3 times a day  - Out of bed for toileting  - Record patient progress and toleration of activity level   Outcome: Progressing     Problem: PAIN - ADULT  Goal: Verbalizes/displays adequate comfort level or baseline comfort level  Description: Interventions:  - Encourage patient to monitor pain and request assistance  - Assess pain using appropriate pain scale  - Administer analgesics based on type and severity of pain and evaluate response  - Implement non-pharmacological measures as appropriate and evaluate response  - Consider cultural and social influences on pain and pain management  - Notify physician/advanced practitioner if interventions unsuccessful or patient reports new pain  Outcome: Progressing     Problem: INFECTION - ADULT  Goal: Absence or prevention of progression during hospitalization  Description: INTERVENTIONS:  - Assess and monitor for signs and symptoms of infection  - Monitor lab/diagnostic results  - Monitor all insertion sites, i e  indwelling lines, tubes, and drains  - Monitor endotracheal if appropriate and nasal secretions for changes in amount and color  - [place] appropriate cooling/warming therapies per order  - Administer medications as ordered  - Instruct and encourage patient and family to use good hand hygiene technique  - Identify and instruct in appropriate isolation precautions for identified infection/condition  Outcome: Progressing     Problem: DISCHARGE PLANNING  Goal: Discharge to home or other facility with appropriate resources  Description: INTERVENTIONS:  - Identify barriers to discharge w/patient and caregiver  - Arrange for needed discharge resources and transportation as appropriate  - Identify discharge learning needs (meds, wound care, etc )  - Arrange for interpretive services to assist at discharge as needed  - Refer to Case Management Department for coordinating discharge planning if the patient needs post-hospital services based on physician/advanced practitioner order or complex needs related to functional status, cognitive ability, or social support system  Outcome: Progressing     Problem: Knowledge Deficit  Goal: Patient/family/caregiver demonstrates understanding of disease process, treatment plan, medications, and discharge instructions  Description: Complete learning assessment and assess knowledge base    Interventions:  - Provide teaching at level of understanding  - Provide teaching via preferred learning methods  Outcome: Progressing

## 2022-08-30 NOTE — CONSULTS
Consultation - General Surgery  Laury Stroud 81 y o  male MRN: [id number]  Unit/Bed#: ED-20 Encounter: [id number]        Assessment/Plan     Assessment:  Laury Stroud is a 81 y o  male with PMHx HTN, HLD, aortic valve replacement for endocarditis, s/p ICD, CHF (last EF 55%), afib, DM, DVT/PE on warfarin, GERD, who presents with acute onset chest pain  ED workup with findings of cholelithiasis  Tbil 1 99  WBC 5 5  Abdomen soft, protuberant, mildly tender upper abdomen, negative Patel's  Without associated symptoms  Discussed with patient and family bedside extensively  The severity of patient's symptoms are seemingly out of proportion for typical cholelithiasis  He is still requiring frequent redosing of analgesic medications  While gallbladder pathology is not completely remote, the extent of patient's presentation warrants further medicine workup to rule out critical pathologies (I e  cardiac)    Plan:  Serial abdominal exams  Recommend further workup of pain etiology per medicine   Surgery team to follow closely  Trend LFTs/tbil/WBC  If suspicion for cholecystitis persists, can consider HIDA scan, however further workup (including cardiac etiology given patient's history) is recommended as acute presentation is inconsistent with acute gallstone etiology       History of Present Illness     HPI:  Laury Stroud is a 81 y o  male with PMHx HTN, HLD, aortic valve replacement for endocarditis, s/p ICD, CHF (last EF 55%), afib, DM, DVT/PE on warfarin, GERD, who presents with acute onset chest pain  Patient states this evening he went out for ice cream with his family and began feeling a pressure sensation across his chest  This pressure spread across his lower abdomen and worsened over time  He described it as "a ton of bricks"  He denies any additional associated symptoms, aside from recent weight loss of 30lbs (which he attributes to his heart failure)   No nausea, emesis, fevers, chills, constipation, diarrhea, changes to bowel movements or urinary symptoms  Patient received nitro, morphine, dilaudid, and multiple fentanyl doses without relief  Due to acute cardiac concerns, CTA dissection protocol was obtained by ED which was revealing for cholelithiasis without cholecystitis  A RUQ US was obtained which was "equivocal for cholecystitis"  Labs significant for tbil 1 99, Hgb 10 4, WBC 5 5, INR 2 59, trops neg,   Abdomen soft, protuberant, mildly tender upper abdomen, negative Patel's  Review of Systems   Constitutional: Negative  HENT: Negative  Eyes: Negative  Respiratory: Negative  Cardiovascular: Positive for chest pain and leg swelling  Endocrine: Negative  Genitourinary: Negative  Musculoskeletal: Negative  Skin: Negative  Allergic/Immunologic: Negative  Neurological: Negative  Hematological: Negative  Psychiatric/Behavioral: Negative  All other systems reviewed and are negative        Historical Information   Past Medical History:   Diagnosis Date    Anemia     Arthritis     Atrial fibrillation ([address] )     Basal cell carcinoma 03/22/2022    Tip of Nose    CHF (congestive heart failure) (HCC)     Diabetes mellitus ([facility] )     Niddm    DVT (deep vein thrombosis) in pregnancy 1966    not in pregnancy    DVT (deep venous thrombosis) ([facility] ) 1966    Dyslipidemia     GERD (gastroesophageal reflux disease)     Hyperlipidemia     Hypertension     Irregular heart beat     Afib    Morbid obesity due to excess calories ([address] )     Resolved 9/2/2014     Pulmonary embolism (HCC)     Sepsis ([address] )     Squamous cell skin cancer 07/30/2020    Left posterior scalp    Visual impairment      Past Surgical History:   Procedure Laterality Date    AORTIC VALVE REPLACEMENT      CARDIAC DEFIBRILLATOR PLACEMENT  04/2014   Mavis Cousin CARDIAC SURGERY  02/2014    AVR    COLONOSCOPY      INSERT / REPLACE / REMOVE PACEMAKER      JOINT REPLACEMENT Left     LTKR    MOHS SURGERY 07/30/2020    Left posterior scalp, Dr Barry Ortiz  04/18/2022    [address]  [employer] Tip of Nose- Dr Destiny Valera SEP-FEM JUNC Right 8/17/2018    Procedure: LEG PERFORATED INJECTION SCLEROTHERAPY;  Surgeon: Kamari Bolton MD;  Location: [facility];  Service: Vascular    REPLACEMENT TOTAL KNEE Right     TOTAL KNEE ARTHROPLASTY Left     VASCULAR SURGERY      VENA CAVA FILTER PLACEMENT      Interruption inferior vena cava, Guttenberg filter, placement    WISDOM TOOTH EXTRACTION       Social History   Social History     Substance and Sexual Activity   Alcohol Use Not Currently     Social History     Substance and Sexual Activity   Drug Use No     Social History     Tobacco Use   Smoking Status Never Smoker   Smokeless Tobacco Never Used     Family History:   Family History   Problem Relation Age of Onset    Arthritis Mother     Stroke Mother     Arthritis Father     Cancer Father     Arthritis Daughter        Meds/Allergies   PTA meds:   Prior to Admission Medications   Prescriptions Last Dose Informant Patient Reported? Taking?    B Complex-C (SUPER B COMPLEX PO)  Self Yes No   Sig: Take 1 capsule by mouth daily    HYDROcodone-acetaminophen (NORCO) 5-325 mg per tablet  Self Yes No   Sig: Take 1 tablet by mouth every 6 (six) hours as needed   Iron Combinations (NIFEREX) TABS  Self Yes No   Sig: Take 1 tablet by mouth in the morning   Multiple Vitamin (MULTIVITAMINS PO)  Self Yes No   Sig: Take 1 tablet by mouth daily   acetaminophen (TYLENOL) 325 mg tablet  Self No No   Sig: Take 2 tablets by mouth every 6 (six) hours as needed for mild pain or fever   albuterol (PROVENTIL HFA,VENTOLIN HFA) 90 mcg/act inhaler  Self Yes No   amoxicillin (AMOXIL) 500 mg capsule  Self Yes No   Sig: As needed   Patient not taking: No sig reported   atorvastatin (LIPITOR) 40 mg tablet  Self Yes No   Sig: Take 40 mg by mouth daily after dinner Atorvastatin Calcium 40 MG Oral Tablet Take 1 tablet daily  Refills: 0  Active    cholecalciferol (VITAMIN D3) 1,000 units tablet  Self Yes No   Sig: Take 2,000 Units by mouth daily   Patient not taking: No sig reported   colchicine (COLCRYS) 0 6 mg tablet  Self Yes No   Sig: Take 0 6 mg by mouth as needed for muscle/joint pain   fluticasone (FLONASE) 50 mcg/act nasal spray  Self Yes No   Si sprays as needed Shake liquid and spray    gabapentin (NEURONTIN) 100 mg capsule  Self Yes No   Sig: Take 100 mg by mouth daily   metFORMIN (GLUCOPHAGE) 1000 MG tablet  Self Yes No   Sig: Take 1,000 mg by mouth daily after dinner MetFORMIN HCl 1000 MG (MOD) TB24 Take one tablet daily  Refills: 0  Active    mupirocin (BACTROBAN) 2 % ointment  Self Yes No   Sig: as needed    omeprazole (PriLOSEC) 20 mg delayed release capsule  Self Yes No   Sig: Omeprazole 20 MG Oral Tablet Delayed Release Take 1 tablet daily  Refills: 0  Active   polyethylene glycol (GLYCOLAX) 17 GM/SCOOP powder  Self Yes No   Sig: Take 17 g by mouth 2 (two) times a day   potassium chloride (K-DUR,KLOR-CON) 20 mEq tablet  Self Yes No   Sig: Take 20 mEq by mouth 2 (two) times a day Potassium Chloride Jesusita ER 20 MEQ Oral Tablet Extended Release Take 1 tablet daily  Refills: 0  Active    sotalol (BETAPACE) 80 mg tablet  Self Yes No   Sig: Take 80 mg by mouth every 12 (twelve) hours    torsemide (DEMADEX) 20 mg tablet  Self No No   Sig: TAKE 3 TABLETS(60 MG TOTAL) BY MOUTH EVERY DAY  CAN TAKE 40 MG IN THE AM AND 20 MG IN THE AFTERNOON   warfarin (COUMADIN) 4 mg tablet  Self No No   Sig: Take 1 tablet (4 mg total) by mouth 2 (two) times a week Patient take 4mg everyday but Tuesday and Friday     Patient taking differently: Take 4 mg by mouth 2 (two) times a week 4 or 6 mg as directed   warfarin (COUMADIN) 6 mg tablet  Self Yes No   Sig: Take 6 mg by mouth The patient takes on Tuesday and       Facility-Administered Medications: None     Allergies   Allergen Reactions    Tramadol Other (See Comments) intolerance       Objective   First Vitals:   Blood Pressure: 131/65 (08/29/22 1943)  Pulse: 61 (08/29/22 1943)  Temperature: 97 6 °F (36 4 °C) (08/29/22 2010)  Temp Source: Oral (08/29/22 2010)  Respirations: 22 (08/29/22 1943)  Height: 6' 1" (185 4 cm) (08/29/22 1943)  Weight - Scale: (!) 145 kg (319 lb 7 1 oz) (08/29/22 1943)  SpO2: 97 % (08/29/22 1943)    Current Vitals:   Blood Pressure: 155/86 (08/30/22 0030)  Pulse: 68 (08/30/22 0030)  Temperature: 97 6 °F (36 4 °C) (08/29/22 2010)  Temp Source: Oral (08/29/22 2010)  Respirations: 17 (08/30/22 0030)  Height: 6' 1" (185 4 cm) (08/29/22 1943)  Weight - Scale: (!) 145 kg (319 lb 7 1 oz) (08/29/22 1943)  SpO2: 100 % (08/30/22 0030)      Intake/Output Summary (Last 24 hours) at 8/30/2022 0158  Last data filed at 8/29/2022 2158  Gross per 24 hour   Intake 1000 ml   Output --   Net 1000 ml       Invasive Devices  Report    Peripheral Intravenous Line  Duration           Peripheral IV 08/29/22 Right Arm <1 day    Peripheral IV 08/29/22 Right Forearm <1 day                Physical Exam  Vitals reviewed  Constitutional:       Appearance: He is obese  HENT:      Head: Normocephalic and atraumatic  Right Ear: External ear normal       Left Ear: External ear normal       Nose: Nose normal       Mouth/Throat:      Mouth: Mucous membranes are moist       Pharynx: Oropharynx is clear  Eyes:      Extraocular Movements: Extraocular movements intact  Conjunctiva/sclera: Conjunctivae normal    Cardiovascular:      Rate and Rhythm: Normal rate  Pulmonary:      Effort: Pulmonary effort is normal  No respiratory distress  Abdominal:      General: There is distension  Palpations: Abdomen is soft  Tenderness: There is abdominal tenderness (mild, nonspecific, upper abdomen)  There is no guarding or rebound  Genitourinary:     Comments: deferred  Musculoskeletal:         General: Normal range of motion  Cervical back: Normal range of motion  Right lower leg: Edema present  Left lower leg: Edema present  Skin:     General: Skin is warm and dry  Neurological:      General: No focal deficit present  Mental Status: He is oriented to person, place, and time  Motor: No weakness  Psychiatric:         Mood and Affect: Mood normal          Judgment: Judgment normal          Lab Results:   CBC:   Lab Results   Component Value Date    WBC 5 50 08/29/2022    HGB 10 4 (L) 08/29/2022    HCT 33 4 (L) 08/29/2022    MCV 87 08/29/2022     08/29/2022    MCH 27 1 08/29/2022    MCHC 31 1 (L) 08/29/2022    RDW 16 5 (H) 08/29/2022    MPV 9 9 08/29/2022    NRBC 0 08/29/2022   , CMP:   Lab Results   Component Value Date    SODIUM 139 08/29/2022    K 3 9 08/29/2022     08/29/2022    CO2 26 08/29/2022    BUN 33 (H) 08/29/2022    CREATININE 1 63 (H) 08/29/2022    CALCIUM 9 5 08/29/2022    AST 17 08/29/2022    ALT 11 08/29/2022    ALKPHOS 78 08/29/2022    EGFR 37 08/29/2022   , Coagulation:   Lab Results   Component Value Date    INR 2 59 (H) 08/29/2022   , Urinalysis: No results found for: Albers Maximo, SPECGRAV, PHUR, LEUKOCYTESUR, NITRITE, PROTEINUA, GLUCOSEU, KETONESU, BILIRUBINUR, BLOODU, Amylase: No results found for: AMYLASE, Lipase:   Lab Results   Component Value Date    LIPASE 53 08/29/2022     Imaging: I have personally reviewed pertinent reports  EKG, Pathology, and Other Studies: I have personally reviewed pertinent reports        Code Status: Prior  Advance Directive and Living Will:      Power of :    POLST:

## 2022-08-30 NOTE — TELEMEDICINE
e-Consult (IPC)  - Interventional Radiology  Bonny Ford 81 y o  male MRN: [id number]  Unit/Bed#: S -01 Encounter: [id number]          Interventional Radiology has been consulted to evaluate Bonny Ford    We were consulted by surgery service concerning this patient with abdominal pain  IP Consult to IR  Consult performed by: Orlin Peoples MD  Consult ordered by: Vazquez Pollock MD        08/30/22    Assessment/Recommendation:   Patient is a 81year-old male with history of paroxysmal atrial fibrillation on coumadin transitioning to heparin, obstructive sleep apnea, CHF, CKD, HTN, now with epigastric abdominal pain  Recent HIDA showed findings of acute cholecystitis  Patient has mild leukocytosis of 11 9, but currently afebrile and hemodynamically stable  INR is currently elevated at 2 49  We will plan for percutaneous cholecystostomy tube placement tomorrow pending INR tomorrow morning, ideally the INR would be <1 5  Please make patient NPO  5-10 minutes, >50% of the total time devoted to medical consultative verbal/EMR discussion between providers  Written report will be generated in the EMR  Thank you for allowing Interventional Radiology to participate in the care of Bonny Ford  Please don't hesitate to call or TigerText us with any questions       Orlin Peoples MD

## 2022-08-30 NOTE — ASSESSMENT & PLAN NOTE
· P/w severe chest and epigastric pain  · HST neg x3, EKG without RADHA  · CTA dissection study neg for dissection   · Imaging shows gallstones but no pericholecystic inflammation on CT however RUQ US read as "equivocal for cholecystitis"  · T   Bili 1 9, no transaminitis, lactate neg  · Will continue with symptomatic management  · No indication to further trend HS troponin, effectively ruled out for ACS  · Given significant ttp over RUQ, will proceed with ordering HIDA  · Low threshold to initiate abx but can monitor off for now given lack of leukocytosis  · Pt rigoring at bedside initially but obtained rectal temp on my exam, normothermic  · Appreciate further surgery input  · Monitor CBC and LFTs

## 2022-08-30 NOTE — ED NOTES
Patient and sheets/gown cleaned and changed  Comfort and safety measures provided  Patient denies complaints at this time  Will monitor        Elisa Rogers RN  08/30/22 6222

## 2022-08-30 NOTE — H&P
[address] 1935, 81 y o  male MRN: [id number]  Unit/Bed#: ED-20 Encounter: [id number]  Primary Care Provider: Jabier Buitrago DO   Date and time admitted to hospital: 8/29/2022  7:32 PM     **Addendum: Given worsening rigors with new leukocytosis this AM, ordered Blood Cx's x2 and will dose empiric Zosyn  Text paged Surgery on call to notify  * Epigastric pain  Assessment & Plan  · P/w severe chest and epigastric pain  · HST neg x3, EKG without RADHA  · CTA dissection study neg for dissection   · Imaging shows gallstones but no pericholecystic inflammation on CT however RUQ US read as "equivocal for cholecystitis"  · T   Bili 1 9, no transaminitis, lactate neg  · Will continue with symptomatic management  · No indication to further trend HS troponin, effectively ruled out for ACS  · Given significant ttp over RUQ, will proceed with ordering HIDA  · Low threshold to initiate abx but can monitor off for now given lack of leukocytosis  · Pt rigoring at bedside initially but obtained rectal temp on my exam, normothermic  · Appreciate further surgery input  · Monitor CBC and LFTs    OBINNA (obstructive sleep apnea)  Assessment & Plan  · Recent home sleep study suggestive of OBINNA but is in process of confirming with in office polysomnography    Chronic heart failure with preserved ejection fraction (HFpEF) ([facility])  Assessment & Plan  Wt Readings from Last 3 Encounters:   08/29/22 (!) 145 kg (319 lb 7 1 oz)   08/23/22 (!) 143 kg (314 lb 3 2 oz)   08/02/22 (!) 144 kg (317 lb)       · BNP in 300's up from prior admissions but satting well on RA and no pulmonary edema on imaging  · Echo May 2022 (while decompensated) with EF 60-% and moderately reduced systolic function, dilated RV, PASP 70 mm Hg  · Examines euvolemic  · Cont home torsemide dose of 60 mg daily for now (increased to 60 mg in July 2022), cont BB   · Monitor daily weights       Stage 3a chronic kidney disease with elevated creatinine  Assessment & Plan  Lab Results   Component Value Date    EGFR 37 08/29/2022    EGFR 30 07/19/2022    EGFR 39 05/09/2022    CREATININE 1 63 (H) 08/29/2022    CREATININE 1 93 (H) 07/19/2022    CREATININE 1 65 (H) 05/24/2022   · Cr 1 63 here, appears near baseline of approx 1 4-1 6    Ventricular tachycardia ([address] )  Assessment & Plan  · ICD placed with recent interrogation in March 2022, nml functioning device    DVT (deep venous thrombosis) ([facility])  Assessment & Plan  · Cont warfarin, monitor INR daily    Essential hypertension  Assessment & Plan  · Cont BB and torsemide    S/P AVR  Assessment & Plan  · H/o endocarditis s/p AVR in 2014  · Echo 5/22 with nml functioning valve  · Continue AC    PAF (paroxysmal atrial fibrillation) ([facility])  Assessment & Plan  · Cont Sotalol 80 mg BID and AC with warfarin  · Warfarin managed by PCP Dr Lennox Fraire of [place]  · Dosing is 6 mg on Tuesdays and Fridays, 4 mg daily on all other days of the week    Anemia  Assessment & Plan  · Hgb 10 4 on arrival, stable   · Monitor CBC    Type 2 diabetes mellitus with renal complication [facility])  Assessment & Plan    Lab Results   Component Value Date    HGBA1C 6 3 (H) 03/01/2022   · Hold Metformin, start SSI  · Fingersticks    VTE Prophylaxis: Warfarin (Coumadin)  / sequential compression device   Code Status: Full  POLST: POLST form is not discussed and not completed at this time  Discussion with family: discussed plan with wife at bedside    Anticipated Length of Stay:  Patient will be admitted on an Observation basis with an anticipated length of stay of  < 2 midnights  Justification for Hospital Stay: epigastric pain requiring pain control    Total Time for Visit, including Counseling / Coordination of Care: 45 minutes  Greater than 50% of this total time spent on direct patient counseling and coordination of care      Chief Complaint:   Epigastric pain    History of Present Illness:    Mona Duggan is a 81 y o  male with PMHx of pAF and VT s/p ICD, CHF, CKD, DM, HTN, who presents with epigastric/chest pain  Patient states he was coming home yesterday evening around 5:00 p m  When he developed sudden onset chest last epigastric pain that has been constant in nature with radiation to his back  He has never had any similar episodes to this pain  He denies any shortness of breath associated with this, denies nausea or diaphoresis associated with the chest pain  He tried taking Tums at home with no relief and ultimately presented to the ED where he says morphine did not even relieve his pain, he finally experience relief following administration of IV Dilaudid  He denies any fevers or chills until this past hour during my exam or he states he suddenly has become extremely cold  He also endorses bright red blood per rectum since yesterday with wiping  He has no knowledge of hemorrhoids and states he had a bowel movement this morning that was normal for him  Denies black or tarry stools  Denies vomiting  Denies paresthesias  Denies headache or dizziness  Denies any recent cough, congestion  Patient states he just saw Dr Sammi Camacho 2 weeks ago who said he was doing well from a cardiac standpoint and congratulated him on a recent 30 lb weight loss  He has made recent lifestyle changes to lose weight and has subsequently noticed less lower extremity edema over the past couple months  Review of Systems:    Review of Systems   Constitutional: Positive for chills and diaphoresis  Negative for activity change, appetite change, fatigue and fever  HENT: Negative for congestion, rhinorrhea and sore throat  Eyes: Negative  Respiratory: Positive for chest tightness  Negative for cough, shortness of breath and wheezing  Cardiovascular: Positive for chest pain  Negative for palpitations and leg swelling  Gastrointestinal: Positive for abdominal pain and blood in stool  Negative for diarrhea, nausea, rectal pain and vomiting  Endocrine: Negative  Genitourinary: Negative for difficulty urinating, dysuria and frequency  Musculoskeletal: Positive for back pain  Skin: Negative  Allergic/Immunologic: Negative  Neurological: Negative for weakness, light-headedness and headaches  Hematological: Negative  Psychiatric/Behavioral: Negative          Past Medical and Surgical History:     Past Medical History:   Diagnosis Date    Anemia     Arthritis     Atrial fibrillation ([facility] )     Basal cell carcinoma 03/22/2022    Tip of Nose    CHF (congestive heart failure) (HCC)     Diabetes mellitus ([facility] )     Niddm    DVT (deep vein thrombosis) in pregnancy 1966    not in pregnancy    DVT (deep venous thrombosis) ([facility] ) 1966    Dyslipidemia     GERD (gastroesophageal reflux disease)     Hyperlipidemia     Hypertension     Irregular heart beat     Afib    Morbid obesity due to excess calories ([facility] )     Resolved 9/2/2014     Pulmonary embolism (HCC)     Sepsis ([facility] )     Squamous cell skin cancer 07/30/2020    Left posterior scalp    Visual impairment        Past Surgical History:   Procedure Laterality Date    AORTIC VALVE REPLACEMENT      CARDIAC DEFIBRILLATOR PLACEMENT  04/2014   [facility] CARDIAC SURGERY  02/2014    AVR    COLONOSCOPY      INSERT / REPLACE / REMOVE PACEMAKER      JOINT REPLACEMENT Left     LTKR    MOHS SURGERY  07/30/2020    Left posterior scalp, Dr Murphy Moment  04/18/2022    BCC Tip of Nose- Dr Fern Ha SEP-FEM JUNC Right 8/17/2018    Procedure: LEG PERFORATED INJECTION SCLEROTHERAPY;  Surgeon: Janine Garcia MD;  Location: [facility];  Service: Vascular    REPLACEMENT TOTAL KNEE Right     TOTAL KNEE ARTHROPLASTY Left     VASCULAR SURGERY      VENA CAVA FILTER PLACEMENT      Interruption inferior vena cava, Josue filter, placement    WISDOM TOOTH EXTRACTION         Meds/Allergies:    Prior to Admission medications    Medication Sig Start Date End Date Taking?  Authorizing Provider   acetaminophen (TYLENOL) 325 mg tablet Take 2 tablets by mouth every 6 (six) hours as needed for mild pain or fever 12/28/16  Yes Manoj Trevizo MD   albuterol (PROVENTIL HFA,VENTOLIN HFA) 90 mcg/act inhaler  3/4/21  Yes Historical Provider, MD   atorvastatin (LIPITOR) 40 mg tablet Take 40 mg by mouth daily after dinner Atorvastatin Calcium 40 MG Oral Tablet Take 1 tablet daily  Refills: 0  Active    Yes Historical Provider, MD   B Complex-C (SUPER B COMPLEX PO) Take 1 capsule by mouth daily    Yes Historical Provider, MD   colchicine (COLCRYS) 0 6 mg tablet Take 0 6 mg by mouth as needed for muscle/joint pain   Yes Historical Provider, MD   fluticasone (FLONASE) 50 mcg/act nasal spray 2 sprays as needed Shake liquid and spray  7/7/21  Yes Historical Provider, MD   gabapentin (NEURONTIN) 100 mg capsule Take 100 mg by mouth daily 1/25/22  Yes Historical Provider, MD   HYDROcodone-acetaminophen (NORCO) 5-325 mg per tablet Take 1 tablet by mouth every 6 (six) hours as needed 1/25/22  Yes Historical Provider, MD   Iron Combinations (NIFEREX) TABS Take 1 tablet by mouth in the morning 2/13/18  Yes Historical Provider, MD   metFORMIN (GLUCOPHAGE) 1000 MG tablet Take 1,000 mg by mouth daily after dinner MetFORMIN HCl 1000 MG (MOD) TB24 Take one tablet daily  Refills: 0  Active    Yes Historical Provider, MD   Multiple Vitamin (MULTIVITAMINS PO) Take 1 tablet by mouth daily   Yes Historical Provider, MD   mupirocin (BACTROBAN) 2 % ointment as needed  1/7/20  Yes Historical Provider, MD   omeprazole (PriLOSEC) 20 mg delayed release capsule Omeprazole 20 MG Oral Tablet Delayed Release Take 1 tablet daily  Refills: 0  Active   Yes Historical Provider, MD   polyethylene glycol (GLYCOLAX) 17 GM/SCOOP powder Take 17 g by mouth 2 (two) times a day   Yes Historical Provider, MD   potassium chloride (K-DUR,KLOR-CON) 20 mEq tablet Take 20 mEq by mouth 2 (two) times a day Potassium Chloride Jesusita ER 20 MEQ Oral Tablet Extended Release Take 1 tablet daily  Refills: 0  Active    Yes Historical Provider, MD   sotalol (BETAPACE) 80 mg tablet Take 80 mg by mouth every 12 (twelve) hours  7/28/18  Yes Historical Provider, MD   torsemide (DEMADEX) 20 mg tablet TAKE 3 TABLETS(60 MG TOTAL) BY MOUTH EVERY DAY  CAN TAKE 40 MG IN THE AM AND 20 MG IN THE AFTERNOON 7/18/22  Yes ODESSA Brown   warfarin (COUMADIN) 4 mg tablet Take 1 tablet (4 mg total) by mouth 2 (two) times a week Patient take 4mg everyday but Tuesday and Friday  Patient taking differently: Take 4 mg by mouth 2 (two) times a week 4 or 6 mg as directed 8/1/19  Yes ODESSA Shah   warfarin (COUMADIN) 6 mg tablet Take 6 mg by mouth The patient takes on Tuesday and Fridays   Yes Historical Provider, MD   amoxicillin (AMOXIL) 500 mg capsule As needed  Patient not taking: No sig reported 5/30/19 8/30/22  Historical Provider, MD   cholecalciferol (VITAMIN D3) 1,000 units tablet Take 2,000 Units by mouth daily  Patient not taking: No sig reported  8/30/22  Historical Provider, MD     I have reviewed home medications with patient personally  Allergies:    Allergies   Allergen Reactions    Tramadol Other (See Comments)     intolerance       Social History:     Marital Status: /Civil Union   Occupation: , still working at [employer]  Patient Pre-hospital Living Situation: home with wife  Patient Pre-hospital Level of Mobility: ambulates with cane   Patient Pre-hospital Diet Restrictions: cardiac  Substance Use History:   Social History     Substance and Sexual Activity   Alcohol Use Not Currently     Social History     Tobacco Use   Smoking Status Never Smoker   Smokeless Tobacco Never Used     Social History     Substance and Sexual Activity   Drug Use No       Family History:    non-contributory    Physical Exam:     Vitals:   Blood Pressure: 156/72 (08/30/22 0400)  Pulse: 80 (08/30/22 0400)  Temperature: 97 6 °F (36 4 °C) (08/29/22 2010)  Temp Source: Oral (08/29/22 2010)  Respirations: 16 (08/30/22 0300)  Height: 6' 1" (185 4 cm) (08/29/22 1943)  Weight - Scale: (!) 145 kg (319 lb 7 1 oz) (08/29/22 1943)  SpO2: 96 % (08/30/22 0400)    Physical Exam  Vitals and nursing note reviewed  Constitutional:       General: He is not in acute distress  Appearance: Normal appearance  He is normal weight  He is not ill-appearing or toxic-appearing  HENT:      Head: Normocephalic and atraumatic  Right Ear: External ear normal       Left Ear: External ear normal       Nose: Nose normal       Mouth/Throat:      Mouth: Mucous membranes are moist       Pharynx: Oropharynx is clear  No oropharyngeal exudate  Eyes:      General: No scleral icterus  Conjunctiva/sclera: Conjunctivae normal    Cardiovascular:      Rate and Rhythm: Normal rate and regular rhythm  Pulses: Normal pulses  Heart sounds: No murmur heard  No gallop  Pulmonary:      Effort: Pulmonary effort is normal  No respiratory distress  Breath sounds: Normal breath sounds  No stridor  No wheezing, rhonchi or rales  Abdominal:      General: Abdomen is flat  Bowel sounds are normal       Tenderness: There is abdominal tenderness (Pos murphys sign, mild ttp over LUQ as well)  Musculoskeletal:      Cervical back: Normal range of motion  Right lower leg: Edema present  Left lower leg: Edema present  Skin:     General: Skin is warm and dry  Capillary Refill: Capillary refill takes less than 2 seconds  Coloration: Skin is not jaundiced or pale  Findings: No lesion  Neurological:      General: No focal deficit present  Mental Status: He is alert  Mental status is at baseline  Sensory: No sensory deficit  Motor: No weakness  Psychiatric:         Mood and Affect: Mood normal          Thought Content: Thought content normal          Additional Data:     Lab Results: I have personally reviewed pertinent reports  Results from last 7 days   Lab Units 08/29/22 1952   WBC Thousand/uL 5 50   HEMOGLOBIN g/dL 10 4*   HEMATOCRIT % 33 4*   PLATELETS Thousands/uL 188   NEUTROS PCT % 64   LYMPHS PCT % 18   MONOS PCT % 12   EOS PCT % 5     Results from last 7 days   Lab Units 08/29/22 1952   SODIUM mmol/L 139   POTASSIUM mmol/L 3 9   CHLORIDE mmol/L 103   CO2 mmol/L 26   BUN mg/dL 33*   CREATININE mg/dL 1 63*   ANION GAP mmol/L 10   CALCIUM mg/dL 9 5   ALBUMIN g/dL 4 2   TOTAL BILIRUBIN mg/dL 1 99*   ALK PHOS U/L 78   ALT U/L 11   AST U/L 17   GLUCOSE RANDOM mg/dL 166*     Results from last 7 days   Lab Units 08/29/22 1952   INR  2 59*             Results from last 7 days   Lab Units 08/29/22  2225   LACTIC ACID mmol/L 1 2       Imaging: I have personally reviewed pertinent reports  US right upper quadrant   Final Result by Julissa Benson MD (92/31 [id number])      1  Cholelithiasis with mild wall thickening and trace pericholecystic fluid  No evidence of sonographic Patel's sign  The findings are equivocal for cholecystitis  Correlate with LFTs  HIDA scan may be considered as clinically appropriate  2   Hepatomegaly  3   Trace perihepatic fluid  Workstation performed: [id number]         CTA dissection protocol chest/abdomen/pelvis   Final Result by Susie Mata DO (08/29 2145)      No aortic aneurysm or dissection  No acute inflammatory process  Cholelithiasis without evidence of acute cholecystitis  Additional nonacute findings, as described  Workstation performed: [id number]         XR chest 1 view portable    (Results Pending)   NM inpatient order    (Results Pending)       EKG, Pathology, and Other Studies Reviewed on Admission:   · EKG: SR, paced at 62 BPM    Allscripts / Epic Records Reviewed: Yes     ** Please Note: This note has been constructed using a voice recognition system   **

## 2022-08-30 NOTE — ASSESSMENT & PLAN NOTE
· Recent home sleep study suggestive of OBINNA but is in process of confirming with in office polysomnography

## 2022-08-30 NOTE — ASSESSMENT & PLAN NOTE
· Warfarin managed by PCP Dr Nathan Calderón of   · Dosing is 6 mg on Tuesdays and Fridays, 4 mg daily on all other days of the week    Plan:  · Cont Sotalol 80 mg BID  · A m   INR - pending a m  values, start heparin drip  · Vitamin K 2 5 mg x1 now  · Warfarin held

## 2022-08-30 NOTE — ASSESSMENT & PLAN NOTE
Lab Results   Component Value Date    EGFR 38 08/30/2022    EGFR 37 08/29/2022    EGFR 30 07/19/2022    CREATININE 1 59 (H) 08/30/2022    CREATININE 1 63 (H) 08/29/2022    CREATININE 1 93 (H) 07/19/2022   · Cr 1 63 here, appears near baseline of approx 1 4-1 6  · A m  CMP

## 2022-08-30 NOTE — ASSESSMENT & PLAN NOTE
Lab Results   Component Value Date    HGBA1C 6 3 (H) 03/01/2022   · Hold Metformin,   · SSI q 6 while NPO  · Fingersticks q 6

## 2022-08-30 NOTE — ASSESSMENT & PLAN NOTE
Lab Results   Component Value Date    EGFR 37 08/29/2022    EGFR 30 07/19/2022    EGFR 39 05/09/2022    CREATININE 1 63 (H) 08/29/2022    CREATININE 1 93 (H) 07/19/2022    CREATININE 1 65 (H) 05/24/2022   · Cr 1 63 here, appears near baseline of approx 1 4-1 6

## 2022-08-30 NOTE — PLAN OF CARE
Problem: Potential for Falls  Goal: Patient will remain free of falls  Description: INTERVENTIONS:  - Educate patient/family on patient safety including physical limitations  - Instruct patient to call for assistance with activity   - Consult OT/PT to assist with strengthening/mobility   - Keep Call bell within reach  - Keep bed low and locked with side rails adjusted as appropriate  - Keep care items and personal belongings within reach  - Initiate and maintain comfort rounds  - Make Fall Risk Sign visible to staff  - Offer Toileting every 2 Hours, in advance of need  - Initiate/Maintain bed alarm  - Obtain necessary fall risk management equipment: Alarms  - Apply yellow socks and bracelet for high fall risk patients  - Consider moving patient to room near nurses station  Outcome: Progressing     Problem: MOBILITY - ADULT  Goal: Maintain or return to baseline ADL function  Description: INTERVENTIONS:  -  Assess patient's ability to carry out ADLs; assess patient's baseline for ADL function and identify physical deficits which impact ability to perform ADLs (bathing, care of mouth/teeth, toileting, grooming, dressing, etc )  - Assess/evaluate cause of self-care deficits   - Assess range of motion  - Assess patient's mobility; develop plan if impaired  - Assess patient's need for assistive devices and provide as appropriate  - Encourage maximum independence but intervene and supervise when necessary  - Involve family in performance of ADLs  - Assess for home care needs following discharge   - Consider OT consult to assist with ADL evaluation and planning for discharge  - Provide patient education as appropriate  Outcome: Progressing  Goal: Maintains/Returns to pre admission functional level  Description: INTERVENTIONS:  - Perform BMAT or MOVE assessment daily    - Set and communicate daily mobility goal to care team and patient/family/caregiver     - Collaborate with rehabilitation services on mobility goals if consulted  - Out of bed to chair 3 times a day   - Out of bed for meals 3 times a day  - Out of bed for toileting  - Record patient progress and toleration of activity level   Outcome: Progressing     Problem: PAIN - ADULT  Goal: Verbalizes/displays adequate comfort level or baseline comfort level  Description: Interventions:  - Encourage patient to monitor pain and request assistance  - Assess pain using appropriate pain scale  - Administer analgesics based on type and severity of pain and evaluate response  - Implement non-pharmacological measures as appropriate and evaluate response  - Consider cultural and social influences on pain and pain management  - Notify physician/advanced practitioner if interventions unsuccessful or patient reports new pain  Outcome: Progressing     Problem: INFECTION - ADULT  Goal: Absence or prevention of progression during hospitalization  Description: INTERVENTIONS:  - Assess and monitor for signs and symptoms of infection  - Monitor lab/diagnostic results  - Monitor all insertion sites, i e  indwelling lines, tubes, and drains  - Monitor endotracheal if appropriate and nasal secretions for changes in amount and color  - [place] appropriate cooling/warming therapies per order  - Administer medications as ordered  - Instruct and encourage patient and family to use good hand hygiene technique  - Identify and instruct in appropriate isolation precautions for identified infection/condition  Outcome: Progressing     Problem: DISCHARGE PLANNING  Goal: Discharge to home or other facility with appropriate resources  Description: INTERVENTIONS:  - Identify barriers to discharge w/patient and caregiver  - Arrange for needed discharge resources and transportation as appropriate  - Identify discharge learning needs (meds, wound care, etc )  - Arrange for interpretive services to assist at discharge as needed  - Refer to Case Management Department for coordinating discharge planning if the patient needs post-hospital services based on physician/advanced practitioner order or complex needs related to functional status, cognitive ability, or social support system  Outcome: Progressing     Problem: Knowledge Deficit  Goal: Patient/family/caregiver demonstrates understanding of disease process, treatment plan, medications, and discharge instructions  Description: Complete learning assessment and assess knowledge base    Interventions:  - Provide teaching at level of understanding  - Provide teaching via preferred learning methods  Outcome: Progressing

## 2022-08-31 PROBLEM — N18.30 ACUTE RENAL FAILURE SUPERIMPOSED ON STAGE 3 CHRONIC KIDNEY DISEASE (HCC): Status: ACTIVE | Noted: 2022-08-31

## 2022-08-31 PROBLEM — K81.0 ACUTE CHOLECYSTITIS: Status: ACTIVE | Noted: 2022-08-30

## 2022-08-31 PROBLEM — N17.9 ACUTE RENAL FAILURE SUPERIMPOSED ON STAGE 3 CHRONIC KIDNEY DISEASE (HCC): Status: ACTIVE | Noted: 2022-08-31

## 2022-08-31 LAB
ALBUMIN SERPL BCP-MCNC: 4 G/DL (ref 3.5–5)
ALP SERPL-CCNC: 63 U/L (ref 34–104)
ALT SERPL W P-5'-P-CCNC: 12 U/L (ref 7–52)
ANION GAP SERPL CALCULATED.3IONS-SCNC: 11 MMOL/L (ref 4–13)
AST SERPL W P-5'-P-CCNC: 18 U/L (ref 13–39)
BASOPHILS # BLD AUTO: 0.06 THOUSANDS/ΜL (ref 0–0.1)
BASOPHILS NFR BLD AUTO: 0 % (ref 0–1)
BILIRUB DIRECT SERPL-MCNC: 0.68 MG/DL (ref 0–0.2)
BILIRUB SERPL-MCNC: 3.78 MG/DL (ref 0.2–1)
BUN SERPL-MCNC: 29 MG/DL (ref 5–25)
CALCIUM SERPL-MCNC: 9.5 MG/DL (ref 8.4–10.2)
CHLORIDE SERPL-SCNC: 106 MMOL/L (ref 96–108)
CO2 SERPL-SCNC: 25 MMOL/L (ref 21–32)
CREAT SERPL-MCNC: 1.91 MG/DL (ref 0.6–1.3)
EOSINOPHIL # BLD AUTO: 0.01 THOUSAND/ΜL (ref 0–0.61)
EOSINOPHIL NFR BLD AUTO: 0 % (ref 0–6)
ERYTHROCYTE [DISTWIDTH] IN BLOOD BY AUTOMATED COUNT: 17.1 % (ref 11.6–15.1)
GFR SERPL CREATININE-BSD FRML MDRD: 31 ML/MIN/1.73SQ M
GLUCOSE SERPL-MCNC: 117 MG/DL (ref 65–140)
GLUCOSE SERPL-MCNC: 153 MG/DL (ref 65–140)
GLUCOSE SERPL-MCNC: 153 MG/DL (ref 65–140)
GLUCOSE SERPL-MCNC: 176 MG/DL (ref 65–140)
HCT VFR BLD AUTO: 37 % (ref 36.5–49.3)
HGB BLD-MCNC: 11.2 G/DL (ref 12–17)
IMM GRANULOCYTES # BLD AUTO: 0.1 THOUSAND/UL (ref 0–0.2)
IMM GRANULOCYTES NFR BLD AUTO: 1 % (ref 0–2)
INR PPP: 2.41 (ref 0.84–1.19)
INR PPP: 3.04 (ref 0.84–1.19)
LYMPHOCYTES # BLD AUTO: 0.84 THOUSANDS/ΜL (ref 0.6–4.47)
LYMPHOCYTES NFR BLD AUTO: 5 % (ref 14–44)
MCH RBC QN AUTO: 27.6 PG (ref 26.8–34.3)
MCHC RBC AUTO-ENTMCNC: 30.3 G/DL (ref 31.4–37.4)
MCV RBC AUTO: 91 FL (ref 82–98)
MONOCYTES # BLD AUTO: 1.53 THOUSAND/ΜL (ref 0.17–1.22)
MONOCYTES NFR BLD AUTO: 10 % (ref 4–12)
NEUTROPHILS # BLD AUTO: 13.43 THOUSANDS/ΜL (ref 1.85–7.62)
NEUTS SEG NFR BLD AUTO: 84 % (ref 43–75)
NRBC BLD AUTO-RTO: 0 /100 WBCS
PLATELET # BLD AUTO: 172 THOUSANDS/UL (ref 149–390)
PMV BLD AUTO: 10.3 FL (ref 8.9–12.7)
POTASSIUM SERPL-SCNC: 4.5 MMOL/L (ref 3.5–5.3)
PROT SERPL-MCNC: 7.2 G/DL (ref 6.4–8.4)
PROTHROMBIN TIME: 26.5 SECONDS (ref 11.6–14.5)
PROTHROMBIN TIME: 31.7 SECONDS (ref 11.6–14.5)
RBC # BLD AUTO: 4.06 MILLION/UL (ref 3.88–5.62)
SODIUM SERPL-SCNC: 142 MMOL/L (ref 135–147)
WBC # BLD AUTO: 15.97 THOUSAND/UL (ref 4.31–10.16)

## 2022-08-31 PROCEDURE — 82248 BILIRUBIN DIRECT: CPT | Performed by: PHYSICIAN ASSISTANT

## 2022-08-31 PROCEDURE — 99232 SBSQ HOSP IP/OBS MODERATE 35: CPT | Performed by: INTERNAL MEDICINE

## 2022-08-31 PROCEDURE — 80053 COMPREHEN METABOLIC PANEL: CPT | Performed by: PHYSICIAN ASSISTANT

## 2022-08-31 PROCEDURE — 99223 1ST HOSP IP/OBS HIGH 75: CPT | Performed by: INTERNAL MEDICINE

## 2022-08-31 PROCEDURE — P9017 PLASMA 1 DONOR FRZ W/IN 8 HR: HCPCS

## 2022-08-31 PROCEDURE — 85610 PROTHROMBIN TIME: CPT

## 2022-08-31 PROCEDURE — 99232 SBSQ HOSP IP/OBS MODERATE 35: CPT | Performed by: SURGERY

## 2022-08-31 PROCEDURE — 82948 REAGENT STRIP/BLOOD GLUCOSE: CPT

## 2022-08-31 PROCEDURE — 85025 COMPLETE CBC W/AUTO DIFF WBC: CPT | Performed by: PHYSICIAN ASSISTANT

## 2022-08-31 RX ORDER — SODIUM CHLORIDE 9 MG/ML
50 INJECTION, SOLUTION INTRAVENOUS CONTINUOUS
Status: DISCONTINUED | OUTPATIENT
Start: 2022-08-31 | End: 2022-09-02

## 2022-08-31 RX ORDER — SODIUM CHLORIDE 9 MG/ML
50 INJECTION, SOLUTION INTRAVENOUS CONTINUOUS
Status: DISCONTINUED | OUTPATIENT
Start: 2022-08-31 | End: 2022-08-31

## 2022-08-31 RX ORDER — INSULIN LISPRO 100 [IU]/ML
2-12 INJECTION, SOLUTION INTRAVENOUS; SUBCUTANEOUS EVERY 6 HOURS
Status: DISCONTINUED | OUTPATIENT
Start: 2022-08-31 | End: 2022-09-02

## 2022-08-31 RX ORDER — PHYTONADIONE 5 MG/1
2.5 TABLET ORAL ONCE
Status: COMPLETED | OUTPATIENT
Start: 2022-08-31 | End: 2022-08-31

## 2022-08-31 RX ADMIN — INSULIN LISPRO 2 UNITS: 100 INJECTION, SOLUTION INTRAVENOUS; SUBCUTANEOUS at 17:38

## 2022-08-31 RX ADMIN — HYDROMORPHONE HYDROCHLORIDE 0.2 MG: 0.2 INJECTION, SOLUTION INTRAMUSCULAR; INTRAVENOUS; SUBCUTANEOUS at 14:43

## 2022-08-31 RX ADMIN — PIPERACILLIN AND TAZOBACTAM 3.38 G: 36; 4.5 INJECTION, POWDER, FOR SOLUTION INTRAVENOUS at 17:38

## 2022-08-31 RX ADMIN — PANTOPRAZOLE SODIUM 40 MG: 40 TABLET, DELAYED RELEASE ORAL at 05:25

## 2022-08-31 RX ADMIN — PIPERACILLIN AND TAZOBACTAM 3.38 G: 36; 4.5 INJECTION, POWDER, FOR SOLUTION INTRAVENOUS at 21:57

## 2022-08-31 RX ADMIN — SODIUM CHLORIDE 50 ML/HR: 0.9 INJECTION, SOLUTION INTRAVENOUS at 11:37

## 2022-08-31 RX ADMIN — POTASSIUM CHLORIDE 20 MEQ: 1500 TABLET, EXTENDED RELEASE ORAL at 17:37

## 2022-08-31 RX ADMIN — HYDROCODONE BITARTRATE AND ACETAMINOPHEN 1 TABLET: 5; 325 TABLET ORAL at 13:47

## 2022-08-31 RX ADMIN — SOTALOL HYDROCHLORIDE 80 MG: 80 TABLET ORAL at 05:25

## 2022-08-31 RX ADMIN — HYDROCODONE BITARTRATE AND ACETAMINOPHEN 1 TABLET: 5; 325 TABLET ORAL at 00:40

## 2022-08-31 RX ADMIN — HYDROCODONE BITARTRATE AND ACETAMINOPHEN 1 TABLET: 5; 325 TABLET ORAL at 07:18

## 2022-08-31 RX ADMIN — SOTALOL HYDROCHLORIDE 80 MG: 80 TABLET ORAL at 17:38

## 2022-08-31 RX ADMIN — TORSEMIDE 40 MG: 20 TABLET ORAL at 09:17

## 2022-08-31 RX ADMIN — INSULIN LISPRO 2 UNITS: 100 INJECTION, SOLUTION INTRAVENOUS; SUBCUTANEOUS at 12:14

## 2022-08-31 RX ADMIN — HYDROMORPHONE HYDROCHLORIDE 0.5 MG: 1 INJECTION, SOLUTION INTRAMUSCULAR; INTRAVENOUS; SUBCUTANEOUS at 10:32

## 2022-08-31 RX ADMIN — HYDROMORPHONE HYDROCHLORIDE 0.2 MG: 0.2 INJECTION, SOLUTION INTRAMUSCULAR; INTRAVENOUS; SUBCUTANEOUS at 20:53

## 2022-08-31 RX ADMIN — PHYTONADIONE 2.5 MG: 5 TABLET ORAL at 17:37

## 2022-08-31 RX ADMIN — POLYSACCHARIDE-IRON COMPLEX 150 MG: 150 CAPSULE ORAL at 09:17

## 2022-08-31 RX ADMIN — GABAPENTIN 100 MG: 100 CAPSULE ORAL at 09:17

## 2022-08-31 RX ADMIN — ATORVASTATIN CALCIUM 40 MG: 40 TABLET, FILM COATED ORAL at 17:37

## 2022-08-31 RX ADMIN — POTASSIUM CHLORIDE 20 MEQ: 1500 TABLET, EXTENDED RELEASE ORAL at 09:17

## 2022-08-31 NOTE — CONSULTS
Consultation - 126 [facility]  Ish Chery 81 y o  male MRN: [id number]  Unit/Bed#: S -01 Encounter: [id number]        Consults    Reason for Consult / Principal Problem: Epigastric pain    HPI: Ish Chery is a 81y o  year old male BMI 39 with multiple medical problems including CHF, ICD placement, atrial fibrillation anticoagulated with Coumadin who presented to the hospital with complaints of acute onset of epigastric and right upper quadrant pain which started Monday after eating some ice cream   Patient says he has never had pain like this ever before  He did not have any blood in his stools or hematemesis  Last colonoscopy in 2015 saw the removal of 2 polyps  No recent EGD  CT scan here 8/29 showed evidence of gallbladder stones, this was followed up by an ultrasound which showed similar findings and also mild thickening of the gallbladder wall equivocal for cholecystitis, CBD was measured at 6 mm at that time  This was followed up by a HIDA scan yesterday which did show findings consistent with cholecystitis, and the CBD appeared to be patent  Our service were consulted as his bilirubin was noted to be elevated; was 2 3 yesterday and is 3 78 this morning  His alkaline phosphatase, ALT and AST however appear to have been normal throughout this time  He is currently receiving FFP transfusion, his INR this morning was 3 04 and he was seen by interventional radiology who are planning to perform percutaneous cholecystostomy tube placement this afternoon  Patient reports ongoing substantial right upper quadrant pain, just recently received pain medications  Has not had vomiting today  REVIEW OF SYSTEMS:    CONSTITUTIONAL: Denies any fever, chills, or rigors  Good appetite, and no recent weight loss  HEENT: No earache or tinnitus  Denies hearing loss or visual disturbances  CARDIOVASCULAR: No chest pain or palpitations     RESPIRATORY: Denies any cough, hemoptysis, shortness of breath or dyspnea on exertion  GASTROINTESTINAL: As noted in the History of Present Illness  GENITOURINARY: No problems with urination  Denies any hematuria or dysuria  NEUROLOGIC: No dizziness or vertigo, denies headaches  MUSCULOSKELETAL: Denies any muscle or joint pain  SKIN: Denies skin rashes or itching  ENDOCRINE: Denies excessive thirst  Denies intolerance to heat or cold  PSYCHOSOCIAL: Denies depression or anxiety  Denies any recent memory loss         Historical Information   Past Medical History:   Diagnosis Date    Anemia     Arthritis     Atrial fibrillation ([facility] )     Basal cell carcinoma 03/22/2022    Tip of Nose    CHF (congestive heart failure) (HCC)     Diabetes mellitus ([facility] )     Niddm    DVT (deep vein thrombosis) in pregnancy 1966    not in pregnancy    DVT (deep venous thrombosis) ([facility] ) 1966    Dyslipidemia     GERD (gastroesophageal reflux disease)     Hyperlipidemia     Hypertension     Irregular heart beat     Afib    Morbid obesity due to excess calories ([facility] )     Resolved 9/2/2014     Pulmonary embolism (HCC)     Sepsis ([facility] )     Squamous cell skin cancer 07/30/2020    Left posterior scalp    Visual impairment      Past Surgical History:   Procedure Laterality Date    AORTIC VALVE REPLACEMENT      CARDIAC DEFIBRILLATOR PLACEMENT  04/2014   [place] CARDIAC SURGERY  02/2014    AVR    COLONOSCOPY      INSERT / REPLACE / REMOVE PACEMAKER      JOINT REPLACEMENT Left     LTKR    MOHS SURGERY  07/30/2020    Left posterior scalp, Dr Della Duran  04/18/2022    BCC Tip of Nose- Dr Kenia Leonard SEP-FEM JUN Right 8/17/2018    Procedure: LEG PERFORATED INJECTION SCLEROTHERAPY;  Surgeon: Geovanny Prince MD;  Location: [facility];  Service: Vascular    REPLACEMENT TOTAL KNEE Right     TOTAL KNEE ARTHROPLASTY Left     VASCULAR SURGERY      VENA CAVA FILTER PLACEMENT      Interruption inferior vena cava, [place] filter, placement    WISDOM TOOTH EXTRACTION       Social History   Social History     Substance and Sexual Activity   Alcohol Use Not Currently     Social History     Substance and Sexual Activity   Drug Use No     Social History     Tobacco Use   Smoking Status Never Smoker   Smokeless Tobacco Never Used     Family History   Problem Relation Age of Onset    Arthritis Mother     Stroke Mother     Arthritis Father     Cancer Father     Arthritis Daughter        Meds/Allergies     Medications Prior to Admission   Medication    acetaminophen (TYLENOL) 325 mg tablet    albuterol (PROVENTIL HFA,VENTOLIN HFA) 90 mcg/act inhaler    atorvastatin (LIPITOR) 40 mg tablet    B Complex-C (SUPER B COMPLEX PO)    colchicine (COLCRYS) 0 6 mg tablet    fluticasone (FLONASE) 50 mcg/act nasal spray    gabapentin (NEURONTIN) 100 mg capsule    HYDROcodone-acetaminophen (NORCO) 5-325 mg per tablet    Iron Combinations (NIFEREX) TABS    metFORMIN (GLUCOPHAGE) 1000 MG tablet    Multiple Vitamin (MULTIVITAMINS PO)    mupirocin (BACTROBAN) 2 % ointment    omeprazole (PriLOSEC) 20 mg delayed release capsule    polyethylene glycol (GLYCOLAX) 17 GM/SCOOP powder    potassium chloride (K-DUR,KLOR-CON) 20 mEq tablet    sotalol (BETAPACE) 80 mg tablet    torsemide (DEMADEX) 20 mg tablet    warfarin (COUMADIN) 4 mg tablet    warfarin (COUMADIN) 6 mg tablet     Current Facility-Administered Medications   Medication Dose Route Frequency    acetaminophen (TYLENOL) tablet 650 mg  650 mg Oral Q6H PRN    atorvastatin (LIPITOR) tablet 40 mg  40 mg Oral After Dinner    gabapentin (NEURONTIN) capsule 100 mg  100 mg Oral Daily    HYDROcodone-acetaminophen (NORCO) 5-325 mg per tablet 1 tablet  1 tablet Oral Q6H PRN    HYDROmorphone (DILAUDID) injection 0 5 mg  0 5 mg Intravenous Q6H PRN    HYDROmorphone HCl (DILAUDID) injection 0 2 mg  0 2 mg Intravenous Q4H PRN    insulin lispro (HumaLOG) 100 units/mL subcutaneous injection 2-12 Units  2-12 Units Subcutaneous Q6H    iron polysaccharides (FERREX) capsule 150 mg  150 mg Oral Daily    pantoprazole (PROTONIX) EC tablet 40 mg  40 mg Oral Early Morning    potassium chloride (K-DUR,KLOR-CON) CR tablet 20 mEq  20 mEq Oral BID    sodium chloride 0 9 % infusion  50 mL/hr Intravenous Continuous    sotalol (BETAPACE) tablet 80 mg  80 mg Oral Q12H       Allergies   Allergen Reactions    Tramadol Other (See Comments)     intolerance           Objective     Blood pressure 140/67, pulse 68, temperature 99 5 °F (37 5 °C), resp  rate 18, height 6' 1" (1 854 m), weight (!) 141 kg (309 lb 15 5 oz), SpO2 91 %  Intake/Output Summary (Last 24 hours) at 8/31/2022 1033  Last data filed at 8/31/2022 1012  Gross per 24 hour   Intake 286 67 ml   Output --   Net 286 67 ml         PHYSICAL EXAM     General Appearance:   Alert, cooperative, no distress, appears stated age    HEENT:   Normocephalic, atraumatic, anicteric      Neck:  Supple, symmetrical, trachea midline, no adenopathy;    thyroid: no enlargement/tenderness/nodules; no carotid  bruit or JVD    Lungs:   Clear to auscultation bilaterally; no rales, rhonchi or wheezing; respirations unlabored    Heart[de-identified]   S1 and S2 normal; regular rate and rhythm; no murmur, rub, or gallop     Abdomen:   Soft, substantial tenderness of the right upper quadrant elicited, non-distended; normal bowel sounds; no masses, no organomegaly    Genitalia:   Deferred    Rectal:   Deferred    Extremities:  No cyanosis, clubbing or edema    Pulses:  2+ and symmetric all extremities    Skin:  Skin color, texture, turgor normal, no rashes or lesions    Lymph nodes:  No palpable cervical, axillary or inguinal lymphadenopathy        Lab Results:   Admission on 08/29/2022   Component Date Value    Ventricular Rate 08/29/2022 62     Atrial Rate 08/29/2022 62     QRSD Interval 08/29/2022 170     QT Interval 08/29/2022 536     QTC Interval 08/29/2022 544     QRS Axis 08/29/2022 -80     T Wave Axis 08/29/2022 74     Ventricular Rate 08/29/2022 61     Atrial Rate 08/29/2022 61     QRSD Interval 08/29/2022 166     QT Interval 08/29/2022 536     QTC Interval 08/29/2022 539     QRS Axis 08/29/2022 253     T Wave Axis 08/29/2022 69     WBC 08/29/2022 5 50     RBC 08/29/2022 3 84 (A)    Hemoglobin 08/29/2022 10 4 (A)    Hematocrit 08/29/2022 33 4 (A)    MCV 08/29/2022 87     MCH 08/29/2022 27 1     MCHC 08/29/2022 31 1 (A)    RDW 08/29/2022 16 5 (A)    MPV 08/29/2022 9 9     Platelets 60/30/3097 188     nRBC 08/29/2022 0     Neutrophils Relative 08/29/2022 64     Immat GRANS % 08/29/2022 0     Lymphocytes Relative 08/29/2022 18     Monocytes Relative 08/29/2022 12     Eosinophils Relative 08/29/2022 5     Basophils Relative 08/29/2022 1     Neutrophils Absolute 08/29/2022 3 53     Immature Grans Absolute 08/29/2022 0 01     Lymphocytes Absolute 08/29/2022 1 00     Monocytes Absolute 08/29/2022 0 68     Eosinophils Absolute 08/29/2022 0 25     Basophils Absolute 08/29/2022 0 03     Sodium 08/29/2022 139     Potassium 08/29/2022 3 9     Chloride 08/29/2022 103     CO2 08/29/2022 26     ANION GAP 08/29/2022 10     BUN 08/29/2022 33 (A)    Creatinine 08/29/2022 1 63 (A)    Glucose 08/29/2022 166 (A)    Calcium 08/29/2022 9 5     AST 08/29/2022 17     ALT 08/29/2022 11     Alkaline Phosphatase 08/29/2022 78     Total Protein 08/29/2022 7 2     Albumin 08/29/2022 4 2     Total Bilirubin 08/29/2022 1 99 (A)    eGFR 08/29/2022 37     hs TnI 0hr 08/29/2022 8     ABO Grouping 08/29/2022 O     Rh Factor 08/29/2022 Negative     Antibody Screen 08/29/2022 Negative     Specimen Expiration Date 08/29/2022 [id number]     Protime 08/29/2022 28 0 (A)    INR 08/29/2022 2 59 (A)    PTT 08/29/2022 45 (A)    BNP 08/29/2022 385 (A)    hs TnI 2hr 08/29/2022 9     Delta 2hr hsTnI 08/29/2022 1     hs TnI 4hr 08/29/2022 9     Delta 4hr hsTnI 08/29/2022 1  Lipase 08/29/2022 53     LACTIC ACID 08/29/2022 1 2     Ventricular Rate 08/29/2022 56     Atrial Rate 08/29/2022 56     CT Interval 08/29/2022 312     QRSD Interval 08/29/2022 178     QT Interval 08/29/2022 562     QTC Interval 08/29/2022 542     P Axis 08/29/2022 53     QRS Axis 08/29/2022 106     T Wave Axis 08/29/2022 51     Sodium 08/30/2022 141     Potassium 08/30/2022 4 3     Chloride 08/30/2022 104     CO2 08/30/2022 26     ANION GAP 08/30/2022 11     BUN 08/30/2022 27 (A)    Creatinine 08/30/2022 1 59 (A)    Glucose 08/30/2022 171 (A)    Glucose, Fasting 08/30/2022 171 (A)    Calcium 08/30/2022 9 7     AST 08/30/2022 18     ALT 08/30/2022 13     Alkaline Phosphatase 08/30/2022 77     Total Protein 08/30/2022 7 7     Albumin 08/30/2022 4 4     Total Bilirubin 08/30/2022 2 30 (A)    eGFR 08/30/2022 38     WBC 08/30/2022 11 91 (A)    RBC 08/30/2022 4 22     Hemoglobin 08/30/2022 11 3 (A)    Hematocrit 08/30/2022 37 0     MCV 08/30/2022 88     MCH 08/30/2022 26 8     MCHC 08/30/2022 30 5 (A)    RDW 08/30/2022 16 6 (A)    MPV 08/30/2022 10 4     Platelets 41/88/4320 192     nRBC 08/30/2022 0     Neutrophils Relative 08/30/2022 82 (A)    Immat GRANS % 08/30/2022 1     Lymphocytes Relative 08/30/2022 6 (A)    Monocytes Relative 08/30/2022 11     Eosinophils Relative 08/30/2022 0     Basophils Relative 08/30/2022 0     Neutrophils Absolute 08/30/2022 9 89 (A)    Immature Grans Absolute 08/30/2022 0 06     Lymphocytes Absolute 08/30/2022 0 65     Monocytes Absolute 08/30/2022 1 25 (A)    Eosinophils Absolute 08/30/2022 0 03     Basophils Absolute 08/30/2022 0 03     Protime 08/30/2022 27 2 (A)    INR 08/30/2022 2 49 (A)    SARS-CoV-2 08/30/2022 Negative     INFLUENZA A PCR 08/30/2022 Negative     INFLUENZA B PCR 08/30/2022 Negative     RSV PCR 08/30/2022 Negative     Blood Culture 08/30/2022 No Growth at 24 hrs       Blood Culture 08/30/2022 No Growth at 24 hrs       POC Glucose 08/30/2022 163 (A)    POC Glucose 08/30/2022 143 (A)    POC Glucose 08/30/2022 137     WBC 08/31/2022 15 97 (A)    RBC 08/31/2022 4 06     Hemoglobin 08/31/2022 11 2 (A)    Hematocrit 08/31/2022 37 0     MCV 08/31/2022 91     MCH 08/31/2022 27 6     MCHC 08/31/2022 30 3 (A)    RDW 08/31/2022 17 1 (A)    MPV 08/31/2022 10 3     Platelets 96/73/2629 172     nRBC 08/31/2022 0     Neutrophils Relative 08/31/2022 84 (A)    Immat GRANS % 08/31/2022 1     Lymphocytes Relative 08/31/2022 5 (A)    Monocytes Relative 08/31/2022 10     Eosinophils Relative 08/31/2022 0     Basophils Relative 08/31/2022 0     Neutrophils Absolute 08/31/2022 13 43 (A)    Immature Grans Absolute 08/31/2022 0 10     Lymphocytes Absolute 08/31/2022 0 84     Monocytes Absolute 08/31/2022 1 53 (A)    Eosinophils Absolute 08/31/2022 0 01     Basophils Absolute 08/31/2022 0 06     Sodium 08/31/2022 142     Potassium 08/31/2022 4 5     Chloride 08/31/2022 106     CO2 08/31/2022 25     ANION GAP 08/31/2022 11     BUN 08/31/2022 29 (A)    Creatinine 08/31/2022 1 91 (A)    Glucose 08/31/2022 153 (A)    Calcium 08/31/2022 9 5     AST 08/31/2022 18     ALT 08/31/2022 12     Alkaline Phosphatase 08/31/2022 63     Total Protein 08/31/2022 7 2     Albumin 08/31/2022 4 0     Total Bilirubin 08/31/2022 3 78 (A)    eGFR 08/31/2022 31     Protime 08/31/2022 31 7 (A)    INR 08/31/2022 3 04 (A)    Unit Product Code 08/31/2022 D4638R39     Unit Number 08/31/2022 [id number]     Unit ABO 08/31/2022 A     Unit [facility] 08/31/2022 POS     Unit Dispense Status 08/31/2022 Issued     Unit Product Volume 08/31/2022 280     Unit Product Code 08/31/2022 [id number]     Unit Number 08/31/2022 [id number]     Unit ABO 08/31/2022 AB     Unit [facility] 08/31/2022 POS     Unit Dispense Status 08/31/2022 Issued     Unit Product Volume 08/31/2022 280     Bilirubin, Direct 08/31/2022 0 68 (A)       Imaging Studies: I have personally reviewed pertinent reports  RIGHT UPPER QUADRANT ULTRASOUND     INDICATION:     Severe epigastric discomfort, onset after eating, cholelithiasis - concern for biliary colic, choledocholithiasis or early cholecystitis      COMPARISON:  CTA dissection protocol on 8/29/2022     TECHNIQUE:   Real-time ultrasound of the right upper quadrant was performed with a curvilinear transducer with both volumetric sweeps and still imaging techniques      FINDINGS:     PANCREAS:  Not visualized due to bowel gas       AORTA AND IVC:  Visualized portions are normal for patient age      LIVER:  Size:  Mildly enlarged  The liver measures 19 6 cm in the midclavicular line  Contour:  Surface contour is smooth  Parenchyma:  Echogenicity and echotexture are within normal limits  No liver mass identified  Limited imaging of the main portal vein shows it to be patent and hepatopetal      BILIARY:  The gallbladder is normal in caliber  Mild wall thickening measuring 4 mm  Trace pericholecystic fluid  There are multiple dependent calculi without sludge  No sonographic Patel sign  No intrahepatic biliary dilatation  Proximal CBD measures 6 0 mm  Distal portion is obscured by bowel gas  No choledocholithiasis      KIDNEY:   Right kidney measures 11 1 x 6 4 x 4 2 cm  Volume 158 5 mL  Kidney within normal limits  4 8 x 3 3 x 3 8 cm cyst in the upper pole      ASCITES:   Trace perihepatic fluid      IMPRESSION:     1  Cholelithiasis with mild wall thickening and trace pericholecystic fluid  No evidence of sonographic Patel's sign  The findings are equivocal for cholecystitis  Correlate with LFTs  HIDA scan may be considered as clinically appropriate      2  Hepatomegaly      3  Trace perihepatic fluid  HEPATOBILIARY SCAN WITH MORPHINE ADMINISTRATION      INDICATION:  Abdominal pain    rule out cholecystitis     COMPARISON: Comparison is made to the gallbladder on coronal CT angiography of the chest, abdomen, and pelvis dated 8/29/2022 as well as abdominal ultrasound dated 8/29/2022      TECHNIQUE:   Following the intravenous administration of 5 2 mCi Tc-99m labeled mebrofenin, dynamic abdominal images were obtained over a 60 minute time period  Images were performed in AP projection         FINDINGS:      There is prompt, uniform accumulation with normal clearance of the radiopharmaceutical by the liver  There is normal excretion of tracer activity via the common bile duct into the small bowel consistent with patent common bile duct      At 60 minutes, the gallbladder was not visualized  Therefore, 6 mg of intravenous morphine was administered      30 minutes after morphine administration, there is nonvisualization of the gallbladder indicating occluded cystic duct  Findings are consistent with acute cholecystitis in the appropriate clinical setting  Please note that false positive results can   occur in various clinical settings such as severe chronic cholecystitis, hyperalimentation, and prolonged nothing by mouth status  Correlation is recommended  ]       IMPRESSION:     1  Nonvisualization of the gallbladder despite morphine administration  Findings consistent with acute cholecystitis in the appropriate clinical setting, see discussion above      2   Patent common bile duct  ASSESSMENT/PLAN:     1  Acute cholecystitis with isolated hyperbilirubinemia, question for choledocholithiasis to account for this finding    He does have multiple medical problems including anticoagulation with Coumadin for atrial fibrillation, CHF requiring ICD placement, obesity with sleep apnea; would be at elevated risk for ERCP    - IR input appreciated, planning for cholecystostomy placement this afternoon with FFP transfusion beforehand    -symptomatic management, pain control; patient's family are enquiring about adjusting patient's pain medications as he still is having substantial discomfort at this time, I discussed with Dr Javed Burnham ([facility])    - I ordered a direct bilirubin to be checked against his total bilirubin drawn from this morning and results indicate his hyperbilirubinemia appears to be predominantly indirect at this time - this may be related to benign conjugation disorder such as Gilbert's syndrome, and is probably related in some degree to cholecystitis itself  His HIDA scan findings also implied patency of the CBD    -in view of above, will continue to monitor patient's liver enzymes after cholecystostomy tube placement, but doubt patient requires ERCP at this time      The patient was seen and examined by Dr Bob Plummer, all pascual medical decisions were made with Dr Bob Plummer  Thank you for allowing us to participate in the care of this pleasant patient  We will follow up with you closely

## 2022-08-31 NOTE — ASSESSMENT & PLAN NOTE
· H/o endocarditis s/p AVR in 2014  · Echo 5/22 with nml functioning valve  · Hold Warfarin - pending IR procedure, restart tonight

## 2022-08-31 NOTE — ASSESSMENT & PLAN NOTE
Lab Results   Component Value Date    EGFR 31 08/31/2022    EGFR 38 08/30/2022    EGFR 37 08/29/2022    CREATININE 1 91 (H) 08/31/2022    CREATININE 1 59 (H) 08/30/2022    CREATININE 1 63 (H) 08/29/2022   On presentation, creatinine 1 63  baseline 1 5-1 6      Plan:  ·

## 2022-08-31 NOTE — PLAN OF CARE
Problem: Potential for Falls  Goal: Patient will remain free of falls  Description: INTERVENTIONS:  - Educate patient/family on patient safety including physical limitations  - Instruct patient to call for assistance with activity   - Consult OT/PT to assist with strengthening/mobility   - Keep Call bell within reach  - Keep bed low and locked with side rails adjusted as appropriate  - Keep care items and personal belongings within reach  - Initiate and maintain comfort rounds  - Make Fall Risk Sign visible to staff  - Offer Toileting every 2 Hours, in advance of need  - Initiate/Maintain bed alarm  - Obtain necessary fall risk management equipment: Alarms  - Apply yellow socks and bracelet for high fall risk patients  - Consider moving patient to room near nurses station  Outcome: Progressing     Problem: MOBILITY - ADULT  Goal: Maintain or return to baseline ADL function  Description: INTERVENTIONS:  -  Assess patient's ability to carry out ADLs; assess patient's baseline for ADL function and identify physical deficits which impact ability to perform ADLs (bathing, care of mouth/teeth, toileting, grooming, dressing, etc )  - Assess/evaluate cause of self-care deficits   - Assess range of motion  - Assess patient's mobility; develop plan if impaired  - Assess patient's need for assistive devices and provide as appropriate  - Encourage maximum independence but intervene and supervise when necessary  - Involve family in performance of ADLs  - Assess for home care needs following discharge   - Consider OT consult to assist with ADL evaluation and planning for discharge  - Provide patient education as appropriate  Outcome: Progressing  Goal: Maintains/Returns to pre admission functional level  Description: INTERVENTIONS:  - Perform BMAT or MOVE assessment daily    - Set and communicate daily mobility goal to care team and patient/family/caregiver     - Collaborate with rehabilitation services on mobility goals if consulted  - Out of bed to chair 3 times a day   - Out of bed for meals 3 times a day  - Out of bed for toileting  - Record patient progress and toleration of activity level   Outcome: Progressing     Problem: PAIN - ADULT  Goal: Verbalizes/displays adequate comfort level or baseline comfort level  Description: Interventions:  - Encourage patient to monitor pain and request assistance  - Assess pain using appropriate pain scale  - Administer analgesics based on type and severity of pain and evaluate response  - Implement non-pharmacological measures as appropriate and evaluate response  - Consider cultural and social influences on pain and pain management  - Notify physician/advanced practitioner if interventions unsuccessful or patient reports new pain  Outcome: Progressing     Problem: INFECTION - ADULT  Goal: Absence or prevention of progression during hospitalization  Description: INTERVENTIONS:  - Assess and monitor for signs and symptoms of infection  - Monitor lab/diagnostic results  - Monitor all insertion sites, i e  indwelling lines, tubes, and drains  - Monitor endotracheal if appropriate and nasal secretions for changes in amount and color  - [place] appropriate cooling/warming therapies per order  - Administer medications as ordered  - Instruct and encourage patient and family to use good hand hygiene technique  - Identify and instruct in appropriate isolation precautions for identified infection/condition  Outcome: Progressing     Problem: DISCHARGE PLANNING  Goal: Discharge to home or other facility with appropriate resources  Description: INTERVENTIONS:  - Identify barriers to discharge w/patient and caregiver  - Arrange for needed discharge resources and transportation as appropriate  - Identify discharge learning needs (meds, wound care, etc )  - Arrange for interpretive services to assist at discharge as needed  - Refer to Case Management Department for coordinating discharge planning if the patient needs post-hospital services based on physician/advanced practitioner order or complex needs related to functional status, cognitive ability, or social support system  Outcome: Progressing     Problem: Knowledge Deficit  Goal: Patient/family/caregiver demonstrates understanding of disease process, treatment plan, medications, and discharge instructions  Description: Complete learning assessment and assess knowledge base  Interventions:  - Provide teaching at level of understanding  - Provide teaching via preferred learning methods  Outcome: Progressing     Problem: Nutrition/Hydration-ADULT  Goal: Nutrient/Hydration intake appropriate for improving, restoring or maintaining nutritional needs  Description: Monitor and assess patient's nutrition/hydration status for malnutrition  Collaborate with interdisciplinary team and initiate plan and interventions as ordered  Monitor patient's weight and dietary intake as ordered or per policy  Utilize nutrition screening tool and intervene as necessary  Determine patient's food preferences and provide high-protein, high-caloric foods as appropriate       INTERVENTIONS:  - Monitor oral intake, urinary output, labs, and treatment plans  - Assess nutrition and hydration status and recommend course of action  - Evaluate amount of meals eaten  - Assist patient with eating if necessary   - Allow adequate time for meals  - Recommend/ encourage appropriate diets, oral nutritional supplements, and vitamin/mineral supplements  - Order, calculate, and assess calorie counts as needed  - Recommend, monitor, and adjust tube feedings and TPN/PPN based on assessed needs  - Assess need for intravenous fluids  - Provide specific nutrition/hydration education as appropriate  - Include patient/family/caregiver in decisions related to nutrition  Outcome: Progressing

## 2022-08-31 NOTE — PROGRESS NOTES
[facility]  Progress Note - Liliana Houser 1935, 81 y o  male MRN: [id number]  Unit/Bed#: S -01 Encounter: [id number]  Primary Care Provider: Fay Elena DO   Date and time admitted to hospital: 8/29/2022  7:32 PM    * Acute cholecystitis  Assessment & Plan  · P/w severe chest and epigastric pain  · HST neg x3, EKG without RADHA  · CTA dissection study neg for dissection   · Imaging shows gallstones but no pericholecystic inflammation on CT however RUQ US read as "equivocal for cholecystitis", CBD 6mm, no evidence of CBD stone  · T   Bili 2 3 not outside of baseline/previously elevated, no transaminitis, lactate neg  · Postprandial, positive Patel's sign on exam, elevated leukocytosis 15 97  · Dx: HIDA scan showed nonvisualization of gallbladder, consistent with acute cholecystitis  · 8/31AM INR 3 04  Plan:  · Continue NPO  · Plan as per surgery for percutaneous cholecystostomy 8/31  · Cont Zosyn given new leukocytosis  · F/u blood cultures - -ve 24h  · Appreciate further surgery input  · Continue to monitor CBC and LFTs  · FFP x2, recheck 8/31 INR  · Goal INR <1 5 for procedure  · GI consult in place, per surgery team     PAF (paroxysmal atrial fibrillation) ([place] [address] )  Assessment & Plan    · Warfarin managed by PCP Dr Baldemar Tobar of   · Dosing is 6 mg on Tuesdays and Fridays, 4 mg daily on all other days of the week  · 8/31 INR 3 09     Plan:  · Vitamin K 2 5 mg x1 yesterday   · Warfarin held  · Cont Sotalol 80 mg BID  · FFPx2 8/31 AM, start heparin drip when INR below 2   · Repeat INR today   · Plan to restart warfarin 5mg after perc jeny tonight   · AM INR     OBINNA (obstructive sleep apnea)  Assessment & Plan  · Recent home sleep study suggestive of OBINNA but is in process of confirming with in office polysomnography    Chronic heart failure with preserved ejection fraction (HFpEF) ([facility] )  Assessment & Plan  Wt Readings from Last 3 Encounters:   08/31/22 (!) 141 kg (309 lb 15 5 oz) 08/23/22 (!) 143 kg (314 lb 3 2 oz)   08/02/22 (!) 144 kg (317 lb)       · BNP in 300's up from prior admissions but satting well on RA and no pulmonary edema on imaging  · Echo May 2022 (while decompensated) with EF 60-% and moderately reduced systolic function, dilated RV, PASP 70 mm Hg  · Examines hypovolemic  · Hold torsemide given REMA 8/31AM, cont BB   · Monitor daily weights       Stage 3a chronic kidney disease with elevated creatinine  Assessment & Plan  Lab Results   Component Value Date    EGFR 31 08/31/2022    EGFR 38 08/30/2022    EGFR 37 08/29/2022    CREATININE 1 91 (H) 08/31/2022    CREATININE 1 59 (H) 08/30/2022    CREATININE 1 63 (H) 08/29/2022   · Cr 1 63 here, appears near baseline of approx 1 4-1 6  · Cr 1 91 on 8/31AM demonstrating REMA - in the setting poor oral intake given NPO status, and diuretic use    Plan:  · Hold home torsemide  · Started on IVF 50 CC/hr  · Urine retention protocol  · Bladder scan  · A m  CMP    Ventricular tachycardia ([address] )  Assessment & Plan  · ICD placed with recent interrogation in March 2022, nml functioning device    DVT (deep venous thrombosis) ([facility])  Assessment & Plan  · Hx of DVT   Plan:  · Hold warfarin, monitor INR daily  · Start SubQ lovenox when INR <2  · Restart Warfarin 5mg after perc jeny    Essential hypertension  Assessment & Plan  · Cont satolol  · Hold torsemide given 8/31AM REMA    S/P AVR  Assessment & Plan  · H/o endocarditis s/p AVR in 2014  · Echo 5/22 with nml functioning valve  · Hold Warfarin - pending IR procedure, restart tonight    Anemia  Assessment & Plan  · Hgb 10 4 on arrival, stable   Baseline around 11    · Hgb 11 2 on 8/31 AM    Plan:  · Monitor CBC    Type 2 diabetes mellitus with renal complication [facility])  Assessment & Plan    Lab Results   Component Value Date    HGBA1C 6 3 (H) 03/01/2022       · Hold Metformin,   · SSI q 6 while NPO  · Fingersticks q 6  · Hypoglycemia protocol in place      VTE Pharmacologic Prophylaxis:   VTE Score: 5 High Risk (Score >/= 5) - Pharmacological DVT Prophylaxis Contraindicated  Sequential Compression Devices Ordered  Plan for IR procedure, INR above goal of 1 5  Mechanical VTE Prophylaxis in Place: Yes    Patient Centered Rounds: I have performed bedside rounds with nursing staff today  Discussions with Specialists or Other Care Team Provider:  NURSING TEAM, SURGERY    Education and Discussions with Family / Patient: Updated  (wife) at bedside  Current Length of Stay: 1 day(s)    Current Patient Status: Inpatient     Discharge Plan / Estimated Discharge Date: Anticipate discharge in 48 hrs to TBD, pending PT/OT and clinical improvement  Code Status: Level 1 - Full Code      Subjective:   Overnight pt experienced 10/10 pain requiring dilaudid x2  Upon entering the room pt said the abdominal pain was improved  He denied N/V, CP, SOB, fever/chills, or change in urination  His wife at bedside said he had not had a BM since he's been in the hospital, though he denied constipation and has been NPO since yesterday  Medical student Nicole Livingston educated them on the percutaneous cholecystostomy tube procedure as well as discussed with the pt and wife, his REMA likely from dehydration given his NPO status since admission  Objective:     Vitals:   Temp (24hrs), Av 4 °F (36 9 °C), Min:97 6 °F (36 4 °C), Max:99 5 °F (37 5 °C)    Temp:  [97 6 °F (36 4 °C)-99 5 °F (37 5 °C)] 99 1 °F (37 3 °C)  HR:  [63-72] 63  Resp:  [16-20] 18  BP: (120-144)/(61-75) 133/68  SpO2:  [90 %-97 %] 94 %  Body mass index is 40 9 kg/m²  Input and Output Summary (last 24 hours): Intake/Output Summary (Last 24 hours) at 2022 1159  Last data filed at 2022 1140  Gross per 24 hour   Intake 653 34 ml   Output --   Net 653 34 ml       Physical Exam:     Physical Exam  Constitutional:       General: He is not in acute distress  Appearance: He is obese  He is ill-appearing     HENT:      Head: Normocephalic and atraumatic  Mouth/Throat:      Mouth: Mucous membranes are moist    Eyes:      General: No scleral icterus  Right eye: No discharge  Left eye: No discharge  Extraocular Movements: Extraocular movements intact  Cardiovascular:      Rate and Rhythm: Normal rate and regular rhythm  Pulses: Normal pulses  Heart sounds: Normal heart sounds  No murmur heard  No gallop  Pulmonary:      Effort: Pulmonary effort is normal  No respiratory distress  Breath sounds: Normal breath sounds  No stridor  No wheezing or rales  Abdominal:      General: Abdomen is flat  Bowel sounds are normal  There is no distension  Palpations: Abdomen is soft  There is no mass  Tenderness: There is abdominal tenderness (RUQ, positive Patel's sign)  There is no right CVA tenderness, guarding or rebound  Musculoskeletal:         General: No tenderness, deformity or signs of injury  Right lower leg: Edema present  Left lower leg: Edema present  Skin:     General: Skin is warm and dry  Coloration: Skin is not jaundiced  Findings: No bruising, erythema or rash  Neurological:      General: No focal deficit present  Mental Status: He is alert and oriented to person, place, and time     Psychiatric:         Mood and Affect: Mood normal          Behavior: Behavior normal           Additional Data:     Labs:  Results from last 7 days   Lab Units 08/31/22  0532   WBC Thousand/uL 15 97*   HEMOGLOBIN g/dL 11 2*   HEMATOCRIT % 37 0   PLATELETS Thousands/uL 172   NEUTROS PCT % 84*   LYMPHS PCT % 5*   MONOS PCT % 10   EOS PCT % 0     Results from last 7 days   Lab Units 08/31/22  0532   SODIUM mmol/L 142   POTASSIUM mmol/L 4 5   CHLORIDE mmol/L 106   CO2 mmol/L 25   BUN mg/dL 29*   CREATININE mg/dL 1 91*   ANION GAP mmol/L 11   CALCIUM mg/dL 9 5   ALBUMIN g/dL 4 0   TOTAL BILIRUBIN mg/dL 3 78*   ALK PHOS U/L 63   ALT U/L 12   AST U/L 18   GLUCOSE RANDOM mg/dL 153*     Results from last 7 days   Lab Units 08/31/22  0532   INR  3 04*     Results from last 7 days   Lab Units 08/31/22  1122 08/30/22  2329 08/30/22  1946 08/30/22  1717   POC GLUCOSE mg/dl 153* 137 143* 163*         Results from last 7 days   Lab Units 08/29/22  2225   LACTIC ACID mmol/L 1 2       Imaging: Reviewed radiology reports from this admission including: HIDA scan    Recent Cultures (last 7 days):     Results from last 7 days   Lab Units 08/30/22  0601 08/30/22  0600   BLOOD CULTURE  No Growth at 24 hrs  No Growth at 24 hrs  Lines/Drains:  Invasive Devices  Report    Peripheral Intravenous Line  Duration           Peripheral IV 08/29/22 Left Forearm 1 day    Peripheral IV 08/29/22 Right Arm 1 day                Telemetry:        Last 24 Hours Medication List:   Current Facility-Administered Medications   Medication Dose Route Frequency Provider Last Rate    acetaminophen  650 mg Oral Q6H PRN Hollice Sites, PA-C      atorvastatin  40 mg Oral After Archuleta Media, PA-C      gabapentin  100 mg Oral Daily Hollice Sites, PA-C      HYDROcodone-acetaminophen  1 tablet Oral Q6H PRN Hollice Sites, PA-C      HYDROmorphone  0 5 mg Intravenous Q6H PRN Hollice Sites, PA-C      HYDROmorphone  0 2 mg Intravenous Q4H PRN Hollice Sites, PA-C      insulin lispro  2-12 Units Subcutaneous Q6H Benny Valle MD      iron polysaccharides  150 mg Oral Daily Hollice Sites, PA-C      pantoprazole  40 mg Oral Early Morning Hollice Sites, PA-C      potassium chloride  20 mEq Oral BID Hollice Sites, PA-C      sodium chloride  50 mL/hr Intravenous Continuous Renetta Adamson MD 50 mL/hr (08/31/22 1137)    sotalol  80 mg Oral Q12H Hollice Sites, PA-C          Today, Patient Was Seen By: Don Alfaro MD    ** Please Note: This note has been constructed using a voice recognition system   **

## 2022-08-31 NOTE — ASSESSMENT & PLAN NOTE
· Hx of DVT   Plan:  · Hold warfarin, monitor INR daily  · Start SubQ lovenox when INR <2  · Restart Warfarin 5mg after perc jeny

## 2022-08-31 NOTE — ASSESSMENT & PLAN NOTE
· P/w severe chest and epigastric pain  · HST neg x3, EKG without RADHA  · CTA dissection study neg for dissection   · Imaging shows gallstones but no pericholecystic inflammation on CT however RUQ US read as "equivocal for cholecystitis", CBD 6mm, no evidence of CBD stone  · T   Bili 2 3 not outside of baseline/previously elevated, no transaminitis, lactate neg  · Postprandial, positive Patel's sign on exam, elevated leukocytosis 15 97  · Dx: HIDA scan showed nonvisualization of gallbladder, consistent with acute cholecystitis  · 8/31AM INR 3 04  Plan:  · Continue NPO  · Plan as per surgery for percutaneous cholecystostomy 8/31  · Cont Zosyn given new leukocytosis  · F/u blood cultures - -ve 24h  · Appreciate further surgery input  · Continue to monitor CBC and LFTs  · FFP x2, recheck 8/31 INR  · Goal INR <1 5 for procedure  · GI consult in place, per surgery team

## 2022-08-31 NOTE — PROGRESS NOTES
Progress Note - General Surgery   Mili Pastures 81 y o  male MRN: [id number]  Unit/Bed#: S -01 Encounter: [id number]    Assessment:  Patient is a 81 y o  male who presented with acute cholecystitis  Plan:  - IR to place percutaneous cholecystostomy tube today   - continue zosyn  - NPO  - pain control  - FFP with INR recheck per medicine      Subjective/Objective     Subjective:   No acute events overnight  Patient denies nausea or vomiting  Patient states he does have abdominal pain  Objective:    Blood pressure 137/73, pulse 69, temperature 98 3 °F (36 8 °C), temperature source Oral, resp  rate 18, height 6' 1" (1 854 m), weight (!) 141 kg (309 lb 15 5 oz), SpO2 95 %  ,Body mass index is 40 9 kg/m²  Intake/Output Summary (Last 24 hours) at 8/31/2022 0630  Last data filed at 8/30/2022 0801  Gross per 24 hour   Intake 100 ml   Output --   Net 100 ml       Invasive Devices  Report    Peripheral Intravenous Line  Duration           Peripheral IV 08/29/22 Left Forearm 1 day    Peripheral IV 08/29/22 Right Arm 1 day                Physical Exam:   Gen:  Well-developed, well-nourished male in NAD  CV: HR 69  Lungs: Normal respiratory effort  Abd: soft, tender RUQ, nondistended  Neuro:  AxO x3      Results from last 7 days   Lab Units 08/30/22  0600 08/29/22 1952   WBC Thousand/uL 11 91* 5 50   HEMOGLOBIN g/dL 11 3* 10 4*   HEMATOCRIT % 37 0 33 4*   PLATELETS Thousands/uL 192 188     Results from last 7 days   Lab Units 08/30/22  0600 08/29/22 1952   POTASSIUM mmol/L 4 3 3 9   CHLORIDE mmol/L 104 103   CO2 mmol/L 26 26   BUN mg/dL 27* 33*   CREATININE mg/dL 1 59* 1 63*   CALCIUM mg/dL 9 7 9 5     Results from last 7 days   Lab Units 08/31/22  0532 08/30/22  0600 08/29/22 1952   INR  3 04* 2 49* 2 59*   PTT seconds  --   --  45*

## 2022-08-31 NOTE — ASSESSMENT & PLAN NOTE
Lab Results   Component Value Date    HGBA1C 6 3 (H) 03/01/2022       · Hold Metformin,   · SSI q 6 while NPO  · Fingersticks q 6  · Hypoglycemia protocol in place

## 2022-08-31 NOTE — QUICK NOTE
70J with plans for cholecystostomy tube placement    A on Coumadin, unfortunately this continue to arise and despite appropriate administration of a vitamin K and FFP INR 2 4 on most recent check    Guideline for placement is INR less than 1 5, however in certain situations may be reasonable to place with even higher INR    CHOLECYSTOSTOMY TUBE PLACEMENT No CBC, INR Yes ²² No Yes Yes Yes Yes ? No Yes Yes Yes ? Yes ? Yes Yes ?1 5/50¹?      We will defer tube placement at this time  - okay to proceed with diet as per primary team    - plan for tube placement tomorrow, hopefully with further correction of INR    This was discussed with primary team and surgical service    Unfortunately also with rising leukocytosis and bilirubin, if patient begins to show signs of systemic decompensation would encourage earlier placement

## 2022-08-31 NOTE — ASSESSMENT & PLAN NOTE
· Warfarin managed by PCP Dr Melly Fraser of   · Dosing is 6 mg on Tuesdays and Fridays, 4 mg daily on all other days of the week  · 8/31 INR 3 09     Plan:  · Vitamin K 2 5 mg x1 yesterday   · Warfarin held  · Cont Sotalol 80 mg BID  · FFPx2 8/31 AM, start heparin drip when INR below 2   · Repeat INR today   · Plan to restart warfarin 5mg after perc jeny tonight   · AM INR

## 2022-08-31 NOTE — ASSESSMENT & PLAN NOTE
Lab Results   Component Value Date    EGFR 31 08/31/2022    EGFR 38 08/30/2022    EGFR 37 08/29/2022    CREATININE 1 91 (H) 08/31/2022    CREATININE 1 59 (H) 08/30/2022    CREATININE 1 63 (H) 08/29/2022   · Cr 1 63 here, appears near baseline of approx 1 4-1 6  · Cr 1 91 on 8/31AM demonstrating REMA - in the setting poor oral intake given NPO status, and diuretic use    Plan:  · Hold home torsemide  · Started on IVF 50 CC/hr  · Urine retention protocol  · Bladder scan  · A m  CMP

## 2022-08-31 NOTE — ASSESSMENT & PLAN NOTE
Wt Readings from Last 3 Encounters:   08/31/22 (!) 141 kg (309 lb 15 5 oz)   08/23/22 (!) 143 kg (314 lb 3 2 oz)   08/02/22 (!) 144 kg (317 lb)       · BNP in 300's up from prior admissions but satting well on RA and no pulmonary edema on imaging  · Echo May 2022 (while decompensated) with EF 60-% and moderately reduced systolic function, dilated RV, PASP 70 mm Hg  · Examines hypovolemic  · Hold torsemide given REMA 8/31AM, cont BB   · Monitor daily weights

## 2022-09-01 ENCOUNTER — APPOINTMENT (INPATIENT)
Dept: RADIOLOGY | Facility: HOSPITAL | Age: 87
DRG: 445 | End: 2022-09-01
Payer: MEDICARE

## 2022-09-01 LAB
ABO GROUP BLD BPU: NORMAL
ABO GROUP BLD BPU: NORMAL
ALBUMIN SERPL BCP-MCNC: 3.7 G/DL (ref 3.5–5)
ALP SERPL-CCNC: 63 U/L (ref 34–104)
ALT SERPL W P-5'-P-CCNC: 12 U/L (ref 7–52)
ANION GAP SERPL CALCULATED.3IONS-SCNC: 11 MMOL/L (ref 4–13)
AST SERPL W P-5'-P-CCNC: 17 U/L (ref 13–39)
BILIRUB DIRECT SERPL-MCNC: 0.92 MG/DL (ref 0–0.2)
BILIRUB SERPL-MCNC: 4.89 MG/DL (ref 0.2–1)
BPU ID: NORMAL
BPU ID: NORMAL
BUN SERPL-MCNC: 37 MG/DL (ref 5–25)
CALCIUM SERPL-MCNC: 9.3 MG/DL (ref 8.4–10.2)
CHLORIDE SERPL-SCNC: 108 MMOL/L (ref 96–108)
CO2 SERPL-SCNC: 24 MMOL/L (ref 21–32)
CREAT SERPL-MCNC: 1.99 MG/DL (ref 0.6–1.3)
ERYTHROCYTE [DISTWIDTH] IN BLOOD BY AUTOMATED COUNT: 17.1 % (ref 11.6–15.1)
GFR SERPL CREATININE-BSD FRML MDRD: 29 ML/MIN/1.73SQ M
GLUCOSE SERPL-MCNC: 129 MG/DL (ref 65–140)
GLUCOSE SERPL-MCNC: 130 MG/DL (ref 65–140)
GLUCOSE SERPL-MCNC: 133 MG/DL (ref 65–140)
GLUCOSE SERPL-MCNC: 134 MG/DL (ref 65–140)
GLUCOSE SERPL-MCNC: 143 MG/DL (ref 65–140)
GLUCOSE SERPL-MCNC: 144 MG/DL (ref 65–140)
GLUCOSE SERPL-MCNC: 176 MG/DL (ref 65–140)
HCT VFR BLD AUTO: 32.8 % (ref 36.5–49.3)
HGB BLD-MCNC: 10 G/DL (ref 12–17)
INR PPP: 1.86 (ref 0.84–1.19)
INR PPP: 2.16 (ref 0.84–1.19)
MCH RBC QN AUTO: 27.5 PG (ref 26.8–34.3)
MCHC RBC AUTO-ENTMCNC: 30.5 G/DL (ref 31.4–37.4)
MCV RBC AUTO: 90 FL (ref 82–98)
PLATELET # BLD AUTO: 172 THOUSANDS/UL (ref 149–390)
PMV BLD AUTO: 10.5 FL (ref 8.9–12.7)
POTASSIUM SERPL-SCNC: 4.1 MMOL/L (ref 3.5–5.3)
PROT SERPL-MCNC: 6.9 G/DL (ref 6.4–8.4)
PROTHROMBIN TIME: 21.7 SECONDS (ref 11.6–14.5)
PROTHROMBIN TIME: 24.3 SECONDS (ref 11.6–14.5)
RBC # BLD AUTO: 3.63 MILLION/UL (ref 3.88–5.62)
SODIUM SERPL-SCNC: 143 MMOL/L (ref 135–147)
UNIT DISPENSE STATUS: NORMAL
UNIT DISPENSE STATUS: NORMAL
UNIT PRODUCT CODE: NORMAL
UNIT PRODUCT CODE: NORMAL
UNIT PRODUCT VOLUME: 280 ML
UNIT PRODUCT VOLUME: 280 ML
UNIT RH: NORMAL
UNIT RH: NORMAL
WBC # BLD AUTO: 14.19 THOUSAND/UL (ref 4.31–10.16)

## 2022-09-01 PROCEDURE — 87205 SMEAR GRAM STAIN: CPT | Performed by: INTERNAL MEDICINE

## 2022-09-01 PROCEDURE — 80053 COMPREHEN METABOLIC PANEL: CPT | Performed by: INTERNAL MEDICINE

## 2022-09-01 PROCEDURE — 99232 SBSQ HOSP IP/OBS MODERATE 35: CPT | Performed by: PHYSICIAN ASSISTANT

## 2022-09-01 PROCEDURE — 85610 PROTHROMBIN TIME: CPT

## 2022-09-01 PROCEDURE — C1729 CATH, DRAINAGE: HCPCS

## 2022-09-01 PROCEDURE — 85610 PROTHROMBIN TIME: CPT | Performed by: INTERNAL MEDICINE

## 2022-09-01 PROCEDURE — P9017 PLASMA 1 DONOR FRZ W/IN 8 HR: HCPCS

## 2022-09-01 PROCEDURE — 85027 COMPLETE CBC AUTOMATED: CPT | Performed by: INTERNAL MEDICINE

## 2022-09-01 PROCEDURE — 99153 MOD SED SAME PHYS/QHP EA: CPT

## 2022-09-01 PROCEDURE — 87070 CULTURE OTHR SPECIMN AEROBIC: CPT | Performed by: INTERNAL MEDICINE

## 2022-09-01 PROCEDURE — 99232 SBSQ HOSP IP/OBS MODERATE 35: CPT | Performed by: INTERNAL MEDICINE

## 2022-09-01 PROCEDURE — 99232 SBSQ HOSP IP/OBS MODERATE 35: CPT | Performed by: SURGERY

## 2022-09-01 PROCEDURE — 87075 CULTR BACTERIA EXCEPT BLOOD: CPT | Performed by: INTERNAL MEDICINE

## 2022-09-01 PROCEDURE — 82948 REAGENT STRIP/BLOOD GLUCOSE: CPT

## 2022-09-01 PROCEDURE — 47490 INCISION OF GALLBLADDER: CPT

## 2022-09-01 PROCEDURE — 82248 BILIRUBIN DIRECT: CPT | Performed by: PHYSICIAN ASSISTANT

## 2022-09-01 PROCEDURE — 99152 MOD SED SAME PHYS/QHP 5/>YRS: CPT

## 2022-09-01 PROCEDURE — C1769 GUIDE WIRE: HCPCS

## 2022-09-01 PROCEDURE — 87077 CULTURE AEROBIC IDENTIFY: CPT | Performed by: INTERNAL MEDICINE

## 2022-09-01 RX ORDER — MIDAZOLAM HYDROCHLORIDE 2 MG/2ML
INJECTION, SOLUTION INTRAMUSCULAR; INTRAVENOUS CODE/TRAUMA/SEDATION MEDICATION
Status: COMPLETED | OUTPATIENT
Start: 2022-09-01 | End: 2022-09-01

## 2022-09-01 RX ORDER — SODIUM CHLORIDE 9 MG/ML
10 INJECTION INTRAVENOUS DAILY
Qty: 300 ML | Refills: 3 | Status: ON HOLD | OUTPATIENT
Start: 2022-09-01 | End: 2022-10-24 | Stop reason: SDUPTHER

## 2022-09-01 RX ORDER — HEPARIN SODIUM 5000 [USP'U]/ML
5000 INJECTION, SOLUTION INTRAVENOUS; SUBCUTANEOUS EVERY 12 HOURS SCHEDULED
Status: DISCONTINUED | OUTPATIENT
Start: 2022-09-01 | End: 2022-09-03 | Stop reason: HOSPADM

## 2022-09-01 RX ORDER — CEFTRIAXONE 2 G/50ML
2000 INJECTION, SOLUTION INTRAVENOUS ONCE
Status: COMPLETED | OUTPATIENT
Start: 2022-09-01 | End: 2022-09-01

## 2022-09-01 RX ORDER — FENTANYL CITRATE 50 UG/ML
INJECTION, SOLUTION INTRAMUSCULAR; INTRAVENOUS CODE/TRAUMA/SEDATION MEDICATION
Status: COMPLETED | OUTPATIENT
Start: 2022-09-01 | End: 2022-09-01

## 2022-09-01 RX ORDER — WARFARIN SODIUM 5 MG/1
5 TABLET ORAL
Status: DISCONTINUED | OUTPATIENT
Start: 2022-09-01 | End: 2022-09-02

## 2022-09-01 RX ORDER — ENOXAPARIN SODIUM 100 MG/ML
40 INJECTION SUBCUTANEOUS EVERY 12 HOURS SCHEDULED
Status: DISCONTINUED | OUTPATIENT
Start: 2022-09-01 | End: 2022-09-01

## 2022-09-01 RX ADMIN — PIPERACILLIN AND TAZOBACTAM 3.38 G: 36; 4.5 INJECTION, POWDER, FOR SOLUTION INTRAVENOUS at 18:36

## 2022-09-01 RX ADMIN — GABAPENTIN 100 MG: 100 CAPSULE ORAL at 09:06

## 2022-09-01 RX ADMIN — HYDROMORPHONE HYDROCHLORIDE 0.5 MG: 1 INJECTION, SOLUTION INTRAMUSCULAR; INTRAVENOUS; SUBCUTANEOUS at 15:56

## 2022-09-01 RX ADMIN — CEFTRIAXONE 2000 MG: 2 INJECTION, SOLUTION INTRAVENOUS at 16:07

## 2022-09-01 RX ADMIN — HYDROMORPHONE HYDROCHLORIDE 0.2 MG: 0.2 INJECTION, SOLUTION INTRAMUSCULAR; INTRAVENOUS; SUBCUTANEOUS at 07:09

## 2022-09-01 RX ADMIN — ACETAMINOPHEN 650 MG: 325 TABLET ORAL at 21:25

## 2022-09-01 RX ADMIN — SOTALOL HYDROCHLORIDE 80 MG: 80 TABLET ORAL at 05:22

## 2022-09-01 RX ADMIN — IOHEXOL 15 ML: 350 INJECTION, SOLUTION INTRAVENOUS at 14:11

## 2022-09-01 RX ADMIN — WARFARIN SODIUM 5 MG: 5 TABLET ORAL at 19:43

## 2022-09-01 RX ADMIN — HEPARIN SODIUM 5000 UNITS: 5000 INJECTION INTRAVENOUS; SUBCUTANEOUS at 22:21

## 2022-09-01 RX ADMIN — HEPARIN SODIUM 5000 UNITS: 5000 INJECTION INTRAVENOUS; SUBCUTANEOUS at 14:41

## 2022-09-01 RX ADMIN — POTASSIUM CHLORIDE 20 MEQ: 1500 TABLET, EXTENDED RELEASE ORAL at 19:43

## 2022-09-01 RX ADMIN — PIPERACILLIN AND TAZOBACTAM 3.38 G: 36; 4.5 INJECTION, POWDER, FOR SOLUTION INTRAVENOUS at 11:57

## 2022-09-01 RX ADMIN — FENTANYL CITRATE 50 MCG: 50 INJECTION, SOLUTION INTRAMUSCULAR; INTRAVENOUS at 13:55

## 2022-09-01 RX ADMIN — MIDAZOLAM HYDROCHLORIDE 0.5 MG: 1 INJECTION, SOLUTION INTRAMUSCULAR; INTRAVENOUS at 13:55

## 2022-09-01 RX ADMIN — POTASSIUM CHLORIDE 20 MEQ: 1500 TABLET, EXTENDED RELEASE ORAL at 09:06

## 2022-09-01 RX ADMIN — FENTANYL CITRATE 50 MCG: 50 INJECTION, SOLUTION INTRAMUSCULAR; INTRAVENOUS at 13:37

## 2022-09-01 RX ADMIN — POLYSACCHARIDE-IRON COMPLEX 150 MG: 150 CAPSULE ORAL at 09:06

## 2022-09-01 RX ADMIN — SOTALOL HYDROCHLORIDE 80 MG: 80 TABLET ORAL at 19:47

## 2022-09-01 RX ADMIN — PIPERACILLIN AND TAZOBACTAM 3.38 G: 36; 4.5 INJECTION, POWDER, FOR SOLUTION INTRAVENOUS at 05:20

## 2022-09-01 RX ADMIN — MIDAZOLAM HYDROCHLORIDE 0.5 MG: 1 INJECTION, SOLUTION INTRAMUSCULAR; INTRAVENOUS at 13:37

## 2022-09-01 RX ADMIN — PANTOPRAZOLE SODIUM 40 MG: 40 TABLET, DELAYED RELEASE ORAL at 05:20

## 2022-09-01 RX ADMIN — ATORVASTATIN CALCIUM 40 MG: 40 TABLET, FILM COATED ORAL at 19:42

## 2022-09-01 RX ADMIN — SODIUM CHLORIDE 50 ML/HR: 0.9 INJECTION, SOLUTION INTRAVENOUS at 11:59

## 2022-09-01 NOTE — ASSESSMENT & PLAN NOTE
Wt Readings from Last 3 Encounters:   09/01/22 (!) 141 kg (310 lb 12 8 oz)   08/23/22 (!) 143 kg (314 lb 3 2 oz)   08/02/22 (!) 144 kg (317 lb)       · BNP in 300's up from prior admissions but satting well on RA and no pulmonary edema on imaging  · Echo May 2022 (while decompensated) with EF 60-% and moderately reduced systolic function, dilated RV, PASP 70 mm Hg  · Examines hypovolemic  · Hold torsemide given REMA 9/1AM, cont BB   · Monitor daily weights

## 2022-09-01 NOTE — BRIEF OP NOTE (RAD/CATH)
INTERVENTIONAL RADIOLOGY PROCEDURE NOTE    Date: 9/1/2022    Procedure: Procedure name not found  Preoperative diagnosis:   1  Chest pain    2  Acute cholecystitis         Postoperative diagnosis: Same  Surgeon: Gisell Crouch MD     Assistant: None  No qualified resident was available  Blood loss: None    Specimens: Yes     Findings: Successful placement of 10F APDL percutaneous cholecystostomy tube into stone-filled GB with turbid, dark brown, slightly thick bilious material recovered, sent for micro analysis  Complications: None immediate      Anesthesia: conscious sedation and local

## 2022-09-01 NOTE — CASE MANAGEMENT
Case Management Discharge Planning Note    Patient name Alfred Holm  Location S /S -35 MRN [id number]  : 1935 Date 2022       Current Admission Date: 2022  Current Admission Diagnosis:Acute cholecystitis   Patient Active Problem List    Diagnosis Date Noted    Acute cholecystitis 2022    Benign prostatic hyperplasia with lower urinary tract symptoms 2022    OBINNA (obstructive sleep apnea)     Chronic heart failure with preserved ejection fraction (HFpEF) ([facility] ) 2022    Stage 3a chronic kidney disease with elevated creatinine 2022    Secondary lymphedema 2018    Ventricular tachycardia ([facility] ) 2018    Chronic venous insufficiency 2018    Venous ulcer of ankle, right ([facility] ) 2018    Generalized edema 2018    PAF (paroxysmal atrial fibrillation) ([facility] ) 2018    S/P AVR 2018    Essential hypertension 2018    DVT (deep venous thrombosis) ([facility] ) 2018    Presence of implantable cardioverter-defibrillator (ICD) 2018    Morbid obesity ([facility] ) 2017    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)     Hydrocele 2017    Chronic anticoagulation 2017    Anemia 2017    Epididymitis 2017    Cellulitis of scrotum 2017    Facial cellulitis 2016    Dental abscess 2016      LOS (days): 2  Geometric Mean LOS (GMLOS) (days): 3 00  Days to GMLOS:0 9     OBJECTIVE:  Risk of Unplanned Readmission Score: 19 7         Current admission status: Inpatient   Preferred Pharmacy:   [address] [address]  Phone: [phone] Fax: [phone]    Primary Care Provider: Mateo Mirza DO    Primary Insurance: MEDICARE  Secondary Insurance: [facility] HEALTH OPTIONS PROGRAM    DISCHARGE DETAILS:    Contacts  Patient Contacts: Spouse-Paloma and patient  Contact Method:  In Person  Reason/Outcome: Continuity of Care, Discharge Planning    Other Referral/Resources/Interventions Provided:  Interventions: [facility]  Referral Comments: CM met with pt and spouse at bedside to offer VNA services at [place]  Family currently declining, wife feels she would be able to care for him after teaching  CM encouraged wife to reach out if she changes her mind      Treatment Team Recommendation: Home with [address] [facility]  Discharge Destination Plan[de-identified] Home

## 2022-09-01 NOTE — PROGRESS NOTES
Progress Note - Paddy Band 81 y o  male MRN: [id number]    Unit/Bed#: S -01 Encounter: [id number]        Subjective:   Patient says he is feeling little better today, still some abdominal pain but not as bad as yesterday, no vomiting or nausea currently    Objective:     Vitals: Blood pressure 148/79, pulse 73, temperature 98 8 °F (37 1 °C), resp  rate 16, height 6' 1" (1 854 m), weight (!) 141 kg (310 lb 12 8 oz), SpO2 99 %  ,Body mass index is 41 01 kg/m²  Intake/Output Summary (Last 24 hours) at 9/1/2022 1408  Last data filed at 9/1/2022 1100  Gross per 24 hour   Intake 730 ml   Output --   Net 730 ml       Physical Exam:   General appearance: alert, appears stated age and cooperative  Lungs: clear to auscultation bilaterally, no labored breathing/accessory muscle use  Heart: regular rate and rhythm, S1, S2 normal, no murmur, click, rub or gallop  Abdomen: soft, non-tender; bowel sounds normal; no masses,  no organomegaly  Extremities: no edema    Invasive Devices  Report    Peripheral Intravenous Line  Duration           Peripheral IV 08/29/22 Right Arm 2 days          Drain  Duration           Cholecystostomy Tube RUQ <1 day                Lab, Imaging and other studies: I have personally reviewed pertinent reports  No results displayed because visit has over 200 results  Latest Reference Range & Units 09/01/22 08:27 09/01/22 10:06 09/01/22 11:37 09/01/22 11:56   POC Glucose 65 - 140 mg/dl   133    PROTIME 11 6 - 14 5 seconds    21 7 (H)   POCT INR 0 84 - 1 19     1 86 (H)   Unit ABO  A  A A     Unit RH  POS  POS NEG     Unit Number  [id number]  [id number]     Unit Dispense Status  Issued  Presumed Trans Issued     (H): Data is abnormally high    Assessment/Plan:    1  Abdominal pain and elevated bilirubin, with acute cholecystitis    Hyperbilirubinemia appears to be predominantly indirect, and 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

## 2022-09-01 NOTE — ASSESSMENT & PLAN NOTE
· Hgb 10 4 on arrival, stable   Baseline around 11    · Hgb 11 2 on 8/31 AM  · Hgb 10 0 on 9/1AM    Plan:  · Monitor CBC

## 2022-09-01 NOTE — OCCUPATIONAL THERAPY NOTE
Occupational Therapy Cancellation       09/01/22 6151   OT Last Visit   OT Visit Date 09/01/22   Note Type   Note type Cancelled Session   Cancel Reasons Medical status   Additional Comments OT consult received  Chart reviewed  NSG reports pt with 10/10 pain and s/p procedure recently and therefore not appropriate at this time  Will f/u as appropraite       [facility], OT

## 2022-09-01 NOTE — ASSESSMENT & PLAN NOTE
· Warfarin managed by PCP Dr Baldemar Tobar of   · Dosing is 6 mg on Tuesdays and Fridays, 4 mg daily on all other days of the week  · Vitamin K 2 5 mg x1 on 8/30 and 8/31  · FFPx2 8/31 AM   · 8/31 INR 3 09     Plan:  · Warfarin held  · Cont Sotalol 80 mg BID  · FFPx2 9/1AM, INR 1 86, Start SubQ Lovenox 40mg   · Repeat INR today   · Plan to restart warfarin 5mg after perc jeny tonight

## 2022-09-01 NOTE — PHYSICAL THERAPY NOTE
Physical Therapy Cancellation Note       09/01/22 1545   PT Last Visit   PT Visit Date 09/01/22   Note Type   Note type Cancelled Session; Evaluation   Cancel Reasons Medical status   Additional Comments referral received for PT eval and tx  attempted to see pt but Corrinne Pap states pt is having 10/10 pain and recently received a procedure  she stated pt is not appropriate presently  will follow and initiate PT as appropriate       Lexis Jarquin, PT

## 2022-09-01 NOTE — DISCHARGE INSTR - AVS FIRST PAGE
Dear Christal Cosby,     It was our pleasure to care for you here at [facility], [address]  It is our hope that we were always able to exceed the expected standards for your care during your stay  You were hospitalized due to Acute cholecystitis  You were cared for on the [facility] 3rd floor by Paloma Santiago MD under the service of Refugio Medeiros MD with the [facility] who covers for your primary care physician (PCP), Chivo De La Cruz DO, while you were hospitalized  If you have any questions or concerns related to this hospitalization, you may contact us at [phone]  For follow up as well as any medication refills, we recommend that you follow up with your primary care physician  A registered nurse will reach out to you by phone within a few days after your discharge to answer any additional questions that you may have after going home  However, at this time we provide for you here, the most important instructions / recommendations at discharge:     Notable Medication Adjustments -   Please, start taking Augmentin (amoxicillin-clavulanate) 875 mg-125 mg once every 12 hours for the next 7 days for your gallbladder infection  Please, take warfarin 2 mg once per day  Please, continue taking the rest of your home medications as you were  Testing Required after Discharge -   Please, obtain PT/INR blood work in 3 days from discharge  Discussed the results with your primary care provider  Important follow up information -   Please, follow-up with your primary care provider within 1 week of discharge from the hospital   Please, follow-up with the surgery team within 2 weeks  Other Instructions -   Please, return to the emergency department if you have any worsening abdominal pain, fever, chills, yellowing of your skin (jaundice), constipation, diarrhea, chest pain, difficulty breathing    Please review this entire after visit summary as additional general instructions including medication list, appointments, activity, diet, any pertinent wound care, and other additional recommendations from your care team that may be provided for you        Sincerely,     Marifer Hubbard MD

## 2022-09-01 NOTE — ASSESSMENT & PLAN NOTE
Lab Results   Component Value Date    EGFR 29 09/01/2022    EGFR 31 08/31/2022    EGFR 38 08/30/2022    CREATININE 1 99 (H) 09/01/2022    CREATININE 1 91 (H) 08/31/2022    CREATININE 1 59 (H) 08/30/2022   · Cr 1 63 here, appears near baseline of approx 1 4-1 6  · Cr 1 91 on 8/31AM demonstrating REMA - in the setting poor oral intake given NPO status, and diuretic use  · Cr 1 99 on 9/1AM demonstrating REMA    Plan:  · Hold home torsemide  · Started on IVF 50 CC/hr  · Urine retention protocol  · Bladder scan  · A m  CMP

## 2022-09-01 NOTE — PROGRESS NOTES
[facility]  Progress Note - Jyoti Terry 1935, 81 y o  male MRN: [id number]  Unit/Bed#: S -01 Encounter: [id number]  Primary Care Provider: Guilherme Powell DO   Date and time admitted to hospital: 8/29/2022  7:32 PM    * Acute cholecystitis  Assessment & Plan  · P/w severe chest and epigastric pain  · HST neg x3, EKG without RADHA  · CTA dissection study neg for dissection   · Imaging shows gallstones but no pericholecystic inflammation on CT however RUQ US read as "equivocal for cholecystitis", CBD 6mm, no evidence of CBD stone  · T   Bili 2 3 not outside of baseline/previously elevated, no transaminitis, lactate neg  · Postprandial, positive Patel's sign on exam, elevated leukocytosis 15 97  · Dx: HIDA scan showed nonvisualization of gallbladder, consistent with acute cholecystitis  · 8/31AM INR 3 04  · 9/1AM INR 2 16  · GI consulted for hyperbilirubinemia--> d/t hepatic congestion vs  Cholecystitis  · recs to treat cholecystitis with fluids and Abx  · Blood cultures negative at 24hr, 9/1 WBC down to 14 19 from 15 97  · Repeat 9/1 INR, 1 86 after 2 FFP u's  Plan:  · Continue NPO  · Plan as per surgery for percutaneous cholecystostomy 9/1  · Cont Zosyn IV  · F/u blood cultures - -ve 48hr  · Appreciate further surgery input  · Continue to monitor CBC and LFTs  · Start SubQ Lovenox 40mg given INR<2  · Goal INR <1 5 for procedure    PAF (paroxysmal atrial fibrillation) ([facility])  Assessment & Plan    · Warfarin managed by PCP Dr Jackie Escobedo of   · Dosing is 6 mg on Tuesdays and Fridays, 4 mg daily on all other days of the week  · Vitamin K 2 5 mg x1 on 8/30 and 8/31  · FFPx2 8/31 AM   · 8/31 INR 3 09     Plan:  · Warfarin held  · Cont Sotalol 80 mg BID  · FFPx2 9/1AM, INR 1 86, Start SubQ Lovenox 40mg   · Repeat INR today   · Plan to restart warfarin 5mg after perc jeny tonight     Chronic heart failure with preserved ejection fraction (HFpEF) ([facility])  Assessment & Plan  Wt Readings from Last 3 Encounters:   09/01/22 (!) 141 kg (310 lb 12 8 oz)   08/23/22 (!) 143 kg (314 lb 3 2 oz)   08/02/22 (!) 144 kg (317 lb)       · BNP in 300's up from prior admissions but satting well on RA and no pulmonary edema on imaging  · Echo May 2022 (while decompensated) with EF 60-% and moderately reduced systolic function, dilated RV, PASP 70 mm Hg  · Examines hypovolemic  · Hold torsemide given REMA 9/1AM, cont BB   · Monitor daily weights       Stage 3a chronic kidney disease with elevated creatinine  Assessment & Plan  Lab Results   Component Value Date    EGFR 29 09/01/2022    EGFR 31 08/31/2022    EGFR 38 08/30/2022    CREATININE 1 99 (H) 09/01/2022    CREATININE 1 91 (H) 08/31/2022    CREATININE 1 59 (H) 08/30/2022   · Cr 1 63 here, appears near baseline of approx 1 4-1 6  · Cr 1 91 on 8/31AM demonstrating REMA - in the setting poor oral intake given NPO status, and diuretic use  · Cr 1 99 on 9/1AM demonstrating REMA    Plan:  · Hold home torsemide  · Started on IVF 50 CC/hr  · Urine retention protocol  · Bladder scan  · A m  CMP    DVT (deep venous thrombosis) ([facility])  Assessment & Plan  · Hx of DVT   Plan:  · Hold warfarin, monitor INR daily  · Start SubQ lovenox when INR <2  · Restart Warfarin 5mg after perc jeny    Essential hypertension  Assessment & Plan  · Cont satolol  · Hold torsemide given 8/31AM, 9/1AM REMA    Anemia  Assessment & Plan  · Hgb 10 4 on arrival, stable  Baseline around 11    · Hgb 11 2 on 8/31 AM  · Hgb 10 0 on 9/1AM    Plan:  · Monitor CBC    Type 2 diabetes mellitus with renal complication [facility])  Assessment & Plan    Lab Results   Component Value Date    HGBA1C 6 3 (H) 03/01/2022       · Hold Metformin,   · SSI q 6 while NPO  · Fingersticks q 6  · Hypoglycemia protocol in place      VTE Pharmacologic Prophylaxis:   VTE Score: 5 High Risk (Score >/= 5) - Pharmacological DVT Prophylaxis Ordered: Enoxaparin (Lovenox)  Sequential Compression Devices Ordered      Mechanical VTE Prophylaxis in Place: Yes    Patient Centered Rounds: I have performed bedside rounds with nursing staff today  Discussions with Specialists or Other Care Team Provider:  Nursing, surgery    Education and Discussions with Family / Patient: Updated  (wife) at bedside  Current Length of Stay: 2 day(s)    Current Patient Status: Inpatient     Discharge Plan / Estimated Discharge Date: Anticipate discharge in 24-48 hours, pending medical improvement and PT OT eval    Code Status: Level 1 - Full Code      Subjective:   Pt was sitting up right and in pain upon entering the room this AM  His wife said he barely slept at night but that his pain was well controlled on Norco  He denied N/V, SOB, CP, headache, fever/chills  Patient has not had any bowel movements recently  Pt stated he had no change in his urination but that he was very thirsty  He is eager to get the percutaneous cholecystotomy tube placed  Objective:     Vitals:   Temp (24hrs), Av 7 °F (37 1 °C), Min:98 2 °F (36 8 °C), Max:100 1 °F (37 8 °C)    Temp:  [98 2 °F (36 8 °C)-100 1 °F (37 8 °C)] 98 8 °F (37 1 °C)  HR:  [64-74] 69  Resp:  [16-19] 16  BP: (102-147)/(39-90) 131/67  SpO2:  [92 %-99 %] 99 %  Body mass index is 41 01 kg/m²  Input and Output Summary (last 24 hours): Intake/Output Summary (Last 24 hours) at 2022 1358  Last data filed at 2022 1100  Gross per 24 hour   Intake 983 33 ml   Output --   Net 983 33 ml       Physical Exam:     Physical Exam  Constitutional:       General: He is not in acute distress  Appearance: He is obese  He is ill-appearing  HENT:      Head: Normocephalic and atraumatic  Mouth/Throat:      Mouth: Mucous membranes are moist    Eyes:      General: No scleral icterus  Right eye: No discharge  Left eye: No discharge  Extraocular Movements: Extraocular movements intact  Cardiovascular:      Rate and Rhythm: Normal rate and regular rhythm  Pulses: Normal pulses  Heart sounds: Murmur (holosystolic) heard  No gallop  Pulmonary:      Effort: Pulmonary effort is normal  No respiratory distress  Breath sounds: Normal breath sounds  No stridor  No wheezing or rales  Abdominal:      General: Abdomen is flat  Bowel sounds are normal  There is no distension  Palpations: Abdomen is soft  There is no mass  Tenderness: There is abdominal tenderness (RUQ, positive Patel's sign)  There is no right CVA tenderness, guarding or rebound  Musculoskeletal:         General: No tenderness, deformity or signs of injury  Right lower leg: Edema present  Left lower leg: Edema present  Skin:     General: Skin is warm and dry  Coloration: Skin is not jaundiced  Findings: No bruising, erythema or rash  Neurological:      General: No focal deficit present  Mental Status: He is alert and oriented to person, place, and time     Psychiatric:         Mood and Affect: Mood normal          Behavior: Behavior normal           Additional Data:     Labs:  Results from last 7 days   Lab Units 09/01/22  0526 08/31/22  0532   WBC Thousand/uL 14 19* 15 97*   HEMOGLOBIN g/dL 10 0* 11 2*   HEMATOCRIT % 32 8* 37 0   PLATELETS Thousands/uL 172 172   NEUTROS PCT %  --  84*   LYMPHS PCT %  --  5*   MONOS PCT %  --  10   EOS PCT %  --  0     Results from last 7 days   Lab Units 09/01/22  0526   SODIUM mmol/L 143   POTASSIUM mmol/L 4 1   CHLORIDE mmol/L 108   CO2 mmol/L 24   BUN mg/dL 37*   CREATININE mg/dL 1 99*   ANION GAP mmol/L 11   CALCIUM mg/dL 9 3   ALBUMIN g/dL 3 7   TOTAL BILIRUBIN mg/dL 4 89*   ALK PHOS U/L 63   ALT U/L 12   AST U/L 17   GLUCOSE RANDOM mg/dL 129     Results from last 7 days   Lab Units 09/01/22  1156   INR  1 86*     Results from last 7 days   Lab Units 09/01/22  1137 09/01/22  0513 09/01/22  0002 08/31/22  2201 08/31/22  1616 08/31/22  1122 08/30/22  2329 08/30/22  1946 08/30/22  1717   POC GLUCOSE mg/dl 133 130 134 117 176* 153* 137 143* 163*         Results from last 7 days   Lab Units 08/29/22  2225   LACTIC ACID mmol/L 1 2       Imaging: No pertinent imaging reviewed  Recent Cultures (last 7 days):     Results from last 7 days   Lab Units 08/30/22  0601 08/30/22  0600   BLOOD CULTURE  No Growth at 48 hrs  No Growth at 48 hrs         Lines/Drains:  Invasive Devices  Report    Peripheral Intravenous Line  Duration           Peripheral IV 08/29/22 Right Arm 2 days          Drain  Duration           Cholecystostomy Tube RUQ <1 day                Telemetry:        Last 24 Hours Medication List:   Current Facility-Administered Medications   Medication Dose Route Frequency Provider Last Rate    acetaminophen  650 mg Oral Q6H PRN Jose Shadow, PA-C      atorvastatin  40 mg Oral After [employer], PA-C      cefTRIAXone  2,000 mg Intravenous Once Jai Light MD      enoxaparin  40 mg Subcutaneous Q12H [address] [place] MD      fentanyl citrate (PF)   Intravenous Code/Trauma/Sedation Aden Bates MD      gabapentin  100 mg Oral Daily Jose [address], [place]      HYDROcodone-acetaminophen  1 tablet Oral Q6H PRN Jose Shadow, PA-NAZARIO      HYDROmorphone  0 5 mg Intravenous Q6H PRN Jose Shadow, PA-C      HYDROmorphone  0 2 mg Intravenous Q4H PRN Jose Shadow, PA-C      insulin lispro  2-12 Units Subcutaneous Q6H Candy Fermin MD      iron polysaccharides  150 mg Oral Daily Jose Chacon, PA-NAZARIO      midazolam    Code/Trauma/Sedation Aden Bates MD      pantoprazole  40 mg Oral Early Morning Jose Chacon, PA-C      piperacillin-tazobactam  3 375 g Intravenous Q6H [place] MD 3 375 g (09/01/22 1157)    potassium chloride  20 mEq Oral BID DARWIN Joseph-NAZARIO      sodium chloride  50 mL/hr Intravenous Continuous Ranjit Baig MD 50 mL/hr (09/01/22 1159)    sotalol  80 mg Oral Q12H Jose Chacon PA-C          Today, Patient Was Seen By: Khang Mcknight MD    ** Please Note: This note has been constructed using a voice recognition system   **

## 2022-09-01 NOTE — PROGRESS NOTES
Progress Note - General Surgery   Kartik Jackson 81 y o  male MRN: [id number]  Unit/Bed#: S -01 Encounter: [id number]    Assessment:  80 yo M with acute cholecystitis  INR 2 1 (2 4)  Tbili 4 89 (3 78, 2  3)  AF, WBC 14 (15, 11)    Plan:  - No perc jeny tube yesterday 2/2 INR   - another 2 u FFP ordered for this am, per SLIM  - f/u post transfusion INR  Ideally, pt would get perc jeny tube today  - cont zosyn  - NPO/IVF  - prn pain, antiemetic regimen  - trend TBili, white count  Monitor for fevers    Rest of care per primary    Subjective/Objective     Subjective: doing well overnight  States abdominal pain improving  Objective:     Blood pressure 130/61, pulse 71, temperature 98 2 °F (36 8 °C), resp  rate 19, height 6' 1" (1 854 m), weight (!) 141 kg (309 lb 15 5 oz), SpO2 92 %  ,Body mass index is 40 9 kg/m²  Intake/Output Summary (Last 24 hours) at 9/1/2022 0631  Last data filed at 9/1/2022 0520  Gross per 24 hour   Intake 1106 67 ml   Output --   Net 1106 67 ml       Invasive Devices  Report    Peripheral Intravenous Line  Duration           Peripheral IV 08/29/22 Left Forearm 2 days    Peripheral IV 08/29/22 Right Arm 2 days                Physical Exam  Vitals and nursing note reviewed  Constitutional:       General: He is not in acute distress  Appearance: Normal appearance  He is normal weight  He is not ill-appearing, toxic-appearing or diaphoretic  HENT:      Head: Normocephalic and atraumatic  Eyes:      Extraocular Movements: Extraocular movements intact  Cardiovascular:      Rate and Rhythm: Normal rate  Pulmonary:      Effort: Pulmonary effort is normal  No respiratory distress  Abdominal:      General: There is no distension  Palpations: Abdomen is soft  Tenderness: There is abdominal tenderness (RUQ)  There is no guarding or rebound  Comments: Protuberant abdomen   Skin:     General: Skin is warm and dry        Capillary Refill: Capillary refill takes less than 2 seconds  Neurological:      General: No focal deficit present  Mental Status: He is alert and oriented to person, place, and time  Psychiatric:         Mood and Affect: Mood normal          Behavior: Behavior normal             Scheduled Meds:  Current Facility-Administered Medications   Medication Dose Route Frequency Provider Last Rate    acetaminophen  650 mg Oral Q6H PRN Rosita Pleva, PA-C      atorvastatin  40 mg Oral After [employer], PA-C      gabapentin  100 mg Oral Daily Rosita Pleva, PA-C      HYDROcodone-acetaminophen  1 tablet Oral Q6H PRN Rosita Pleva, PA-C      HYDROmorphone  0 5 mg Intravenous Q6H PRN Rosita Pleva, PA-C      HYDROmorphone  0 2 mg Intravenous Q4H PRN Rosita Pleva, PA-C      insulin lispro  2-12 Units Subcutaneous Q6H Sharmila Payan MD      iron polysaccharides  150 mg Oral Daily Rosita Pleva, PA-C      pantoprazole  40 mg Oral Early Morning Rosita Pleva, PA-C      piperacillin-tazobactam  3 375 g Intravenous Q6H Melecio Garcia MD 3 375 g (09/01/22 0520)    potassium chloride  20 mEq Oral BID Rosita Castanon, PA-C      sodium chloride  50 mL/hr Intravenous Continuous Elsy Martinez MD 50 mL/hr (08/31/22 1137)    sotalol  80 mg Oral Q12H Clari Yancey PA-C       Continuous Infusions:sodium chloride, 50 mL/hr, Last Rate: 50 mL/hr (08/31/22 1137)      PRN Meds:   acetaminophen    HYDROcodone-acetaminophen    HYDROmorphone    HYDROmorphone      Lab, Imaging and other studies:  I have personally reviewed pertinent lab results    , CBC:   Lab Results   Component Value Date    WBC 14 19 (H) 09/01/2022    HGB 10 0 (L) 09/01/2022    HCT 32 8 (L) 09/01/2022    MCV 90 09/01/2022     09/01/2022    MCH 27 5 09/01/2022    MCHC 30 5 (L) 09/01/2022    RDW 17 1 (H) 09/01/2022    MPV 10 5 09/01/2022   , CMP:   Lab Results   Component Value Date    SODIUM 143 09/01/2022    K 4 1 09/01/2022    CL 108 09/01/2022    CO2 24 09/01/2022    BUN 37 (H) 09/01/2022    CREATININE 1 99 (H) 09/01/2022    CALCIUM 9 3 09/01/2022    AST 17 09/01/2022    ALT 12 09/01/2022    ALKPHOS 63 09/01/2022    EGFR 29 09/01/2022   , Coagulation:   Lab Results   Component Value Date    INR 2 16 (H) 09/01/2022   , Urinalysis: No results found for: Jimmy Climes, SPECGRAV, PHUR, LEUKOCYTESUR, NITRITE, PROTEINUA, GLUCOSEU, KETONESU, BILIRUBINUR, BLOODU  VTE Pharmacologic Prophylaxis: Reason for no pharmacologic prophylaxis supratherapeutic INR  VTE Mechanical Prophylaxis: sequential compression device      Cathi Montelongo PA-C  9/1/2022 6:31 AM

## 2022-09-01 NOTE — ASSESSMENT & PLAN NOTE
· P/w severe chest and epigastric pain  · HST neg x3, EKG without RADHA  · CTA dissection study neg for dissection   · Imaging shows gallstones but no pericholecystic inflammation on CT however RUQ US read as "equivocal for cholecystitis", CBD 6mm, no evidence of CBD stone  · T   Bili 2 3 not outside of baseline/previously elevated, no transaminitis, lactate neg  · Postprandial, positive Patel's sign on exam, elevated leukocytosis 15 97  · Dx: HIDA scan showed nonvisualization of gallbladder, consistent with acute cholecystitis  · 8/31AM INR 3 04  · 9/1AM INR 2 16  · GI consulted for hyperbilirubinemia--> d/t hepatic congestion vs  Cholecystitis  · recs to treat cholecystitis with fluids and Abx  · Blood cultures negative at 24hr, 9/1 WBC down to 14 19 from 15 97  · Repeat 9/1 INR, 1 86 after 2 FFP u's  Plan:  · Continue NPO  · Plan as per surgery for percutaneous cholecystostomy 9/1  · Cont Zosyn IV  · F/u blood cultures - -ve 48hr  · Appreciate further surgery input  · Continue to monitor CBC and LFTs  · Start SubQ Lovenox 40mg given INR<2  · Goal INR <1 5 for procedure

## 2022-09-02 LAB
ABO GROUP BLD BPU: NORMAL
ALBUMIN SERPL BCP-MCNC: 3.3 G/DL (ref 3.5–5)
ALP SERPL-CCNC: 61 U/L (ref 34–104)
ALT SERPL W P-5'-P-CCNC: 13 U/L (ref 7–52)
ANION GAP SERPL CALCULATED.3IONS-SCNC: 10 MMOL/L (ref 4–13)
AST SERPL W P-5'-P-CCNC: 23 U/L (ref 13–39)
BILIRUB DIRECT SERPL-MCNC: 1.07 MG/DL (ref 0–0.2)
BILIRUB SERPL-MCNC: 4.47 MG/DL (ref 0.2–1)
BPU ID: NORMAL
BUN SERPL-MCNC: 38 MG/DL (ref 5–25)
CALCIUM ALBUM COR SERPL-MCNC: 9.3 MG/DL (ref 8.3–10.1)
CALCIUM SERPL-MCNC: 8.7 MG/DL (ref 8.4–10.2)
CHLORIDE SERPL-SCNC: 108 MMOL/L (ref 96–108)
CO2 SERPL-SCNC: 25 MMOL/L (ref 21–32)
CREAT SERPL-MCNC: 1.95 MG/DL (ref 0.6–1.3)
ERYTHROCYTE [DISTWIDTH] IN BLOOD BY AUTOMATED COUNT: 17.2 % (ref 11.6–15.1)
GFR SERPL CREATININE-BSD FRML MDRD: 30 ML/MIN/1.73SQ M
GLUCOSE SERPL-MCNC: 136 MG/DL (ref 65–140)
GLUCOSE SERPL-MCNC: 142 MG/DL (ref 65–140)
GLUCOSE SERPL-MCNC: 146 MG/DL (ref 65–140)
GLUCOSE SERPL-MCNC: 151 MG/DL (ref 65–140)
GLUCOSE SERPL-MCNC: 156 MG/DL (ref 65–140)
GLUCOSE SERPL-MCNC: 180 MG/DL (ref 65–140)
HCT VFR BLD AUTO: 31.4 % (ref 36.5–49.3)
HGB BLD-MCNC: 9.7 G/DL (ref 12–17)
INR PPP: 1.97 (ref 0.84–1.19)
MCH RBC QN AUTO: 27.7 PG (ref 26.8–34.3)
MCHC RBC AUTO-ENTMCNC: 30.9 G/DL (ref 31.4–37.4)
MCV RBC AUTO: 90 FL (ref 82–98)
PLATELET # BLD AUTO: 169 THOUSANDS/UL (ref 149–390)
PMV BLD AUTO: 10.6 FL (ref 8.9–12.7)
POTASSIUM SERPL-SCNC: 4 MMOL/L (ref 3.5–5.3)
PROT SERPL-MCNC: 6.2 G/DL (ref 6.4–8.4)
PROTHROMBIN TIME: 22.7 SECONDS (ref 11.6–14.5)
RBC # BLD AUTO: 3.5 MILLION/UL (ref 3.88–5.62)
SODIUM SERPL-SCNC: 143 MMOL/L (ref 135–147)
UNIT DISPENSE STATUS: NORMAL
UNIT PRODUCT CODE: NORMAL
UNIT PRODUCT VOLUME: 250 ML
UNIT PRODUCT VOLUME: 280 ML
UNIT PRODUCT VOLUME: 280 ML
UNIT RH: NORMAL
WBC # BLD AUTO: 8.86 THOUSAND/UL (ref 4.31–10.16)

## 2022-09-02 PROCEDURE — 97116 GAIT TRAINING THERAPY: CPT

## 2022-09-02 PROCEDURE — 85027 COMPLETE CBC AUTOMATED: CPT

## 2022-09-02 PROCEDURE — 99232 SBSQ HOSP IP/OBS MODERATE 35: CPT | Performed by: SURGERY

## 2022-09-02 PROCEDURE — 85610 PROTHROMBIN TIME: CPT

## 2022-09-02 PROCEDURE — 97163 PT EVAL HIGH COMPLEX 45 MIN: CPT

## 2022-09-02 PROCEDURE — 80053 COMPREHEN METABOLIC PANEL: CPT

## 2022-09-02 PROCEDURE — 82948 REAGENT STRIP/BLOOD GLUCOSE: CPT

## 2022-09-02 PROCEDURE — 82248 BILIRUBIN DIRECT: CPT | Performed by: PHYSICIAN ASSISTANT

## 2022-09-02 PROCEDURE — 99232 SBSQ HOSP IP/OBS MODERATE 35: CPT | Performed by: INTERNAL MEDICINE

## 2022-09-02 RX ORDER — POLYETHYLENE GLYCOL 3350 17 G/17G
17 POWDER, FOR SOLUTION ORAL DAILY
Status: DISCONTINUED | OUTPATIENT
Start: 2022-09-02 | End: 2022-09-03 | Stop reason: HOSPADM

## 2022-09-02 RX ORDER — INSULIN LISPRO 100 [IU]/ML
2-12 INJECTION, SOLUTION INTRAVENOUS; SUBCUTANEOUS
Status: DISCONTINUED | OUTPATIENT
Start: 2022-09-02 | End: 2022-09-03 | Stop reason: HOSPADM

## 2022-09-02 RX ORDER — WARFARIN SODIUM 2 MG/1
2 TABLET ORAL
Status: DISCONTINUED | OUTPATIENT
Start: 2022-09-02 | End: 2022-09-03 | Stop reason: HOSPADM

## 2022-09-02 RX ADMIN — POLYSACCHARIDE-IRON COMPLEX 150 MG: 150 CAPSULE ORAL at 08:39

## 2022-09-02 RX ADMIN — INSULIN LISPRO 2 UNITS: 100 INJECTION, SOLUTION INTRAVENOUS; SUBCUTANEOUS at 23:00

## 2022-09-02 RX ADMIN — PIPERACILLIN AND TAZOBACTAM 3.38 G: 36; 4.5 INJECTION, POWDER, FOR SOLUTION INTRAVENOUS at 13:11

## 2022-09-02 RX ADMIN — INSULIN LISPRO 2 UNITS: 100 INJECTION, SOLUTION INTRAVENOUS; SUBCUTANEOUS at 00:35

## 2022-09-02 RX ADMIN — PIPERACILLIN AND TAZOBACTAM 3.38 G: 36; 4.5 INJECTION, POWDER, FOR SOLUTION INTRAVENOUS at 05:38

## 2022-09-02 RX ADMIN — POTASSIUM CHLORIDE 20 MEQ: 1500 TABLET, EXTENDED RELEASE ORAL at 17:33

## 2022-09-02 RX ADMIN — ATORVASTATIN CALCIUM 40 MG: 40 TABLET, FILM COATED ORAL at 17:33

## 2022-09-02 RX ADMIN — HEPARIN SODIUM 5000 UNITS: 5000 INJECTION INTRAVENOUS; SUBCUTANEOUS at 08:40

## 2022-09-02 RX ADMIN — GABAPENTIN 100 MG: 100 CAPSULE ORAL at 08:40

## 2022-09-02 RX ADMIN — POLYETHYLENE GLYCOL 3350 17 G: 17 POWDER, FOR SOLUTION ORAL at 09:34

## 2022-09-02 RX ADMIN — POTASSIUM CHLORIDE 20 MEQ: 1500 TABLET, EXTENDED RELEASE ORAL at 08:39

## 2022-09-02 RX ADMIN — WARFARIN SODIUM 2 MG: 2 TABLET ORAL at 17:33

## 2022-09-02 RX ADMIN — PANTOPRAZOLE SODIUM 40 MG: 40 TABLET, DELAYED RELEASE ORAL at 04:53

## 2022-09-02 RX ADMIN — HEPARIN SODIUM 5000 UNITS: 5000 INJECTION INTRAVENOUS; SUBCUTANEOUS at 22:59

## 2022-09-02 RX ADMIN — INSULIN LISPRO 2 UNITS: 100 INJECTION, SOLUTION INTRAVENOUS; SUBCUTANEOUS at 11:49

## 2022-09-02 RX ADMIN — PIPERACILLIN AND TAZOBACTAM 3.38 G: 36; 4.5 INJECTION, POWDER, FOR SOLUTION INTRAVENOUS at 00:31

## 2022-09-02 RX ADMIN — PIPERACILLIN AND TAZOBACTAM 3.38 G: 36; 4.5 INJECTION, POWDER, FOR SOLUTION INTRAVENOUS at 18:25

## 2022-09-02 RX ADMIN — SOTALOL HYDROCHLORIDE 80 MG: 80 TABLET ORAL at 08:39

## 2022-09-02 NOTE — CASE MANAGEMENT
Case Management Discharge Planning Note    Patient name Komal   Location S /S -67 MRN [id number]  : 1935 Date 2022       Current Admission Date: 2022  Current Admission Diagnosis:Acute cholecystitis   Patient Active Problem List    Diagnosis Date Noted    Acute cholecystitis 2022    Benign prostatic hyperplasia with lower urinary tract symptoms 2022    OBINNA (obstructive sleep apnea)     Chronic heart failure with preserved ejection fraction (HFpEF) ([facility] ) 2022    Stage 3a chronic kidney disease with elevated creatinine 2022    Secondary lymphedema 2018    Ventricular tachycardia ([facility] ) 2018    Chronic venous insufficiency 2018    Venous ulcer of ankle, right ([facility] ) 2018    Generalized edema 2018    PAF (paroxysmal atrial fibrillation) ([facility] ) 2018    S/P AVR 2018    Essential hypertension 2018    DVT (deep venous thrombosis) ([facility] ) 2018    Presence of implantable cardioverter-defibrillator (ICD) 2018    Morbid obesity ([facility] ) 2017    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)     Hydrocele 2017    Chronic anticoagulation 2017    Anemia 2017    Epididymitis 2017    Cellulitis of scrotum 2017    Facial cellulitis 2016    Dental abscess 2016      LOS (days): 3  Geometric Mean LOS (GMLOS) (days): 3 00  Days to GMLOS:0     OBJECTIVE:  Risk of Unplanned Readmission Score: 20 58         Current admission status: Inpatient   Preferred Pharmacy:   [address]   [address] [place] [address] [place] [address]  Phone: [phone] Fax: [phone]    Primary Care Provider: Roxane Vallecillo DO    Primary Insurance: MEDICARE  Secondary Insurance: [facility] HEALTH OPTIONS PROGRAM    DISCHARGE DETAILS:    Discharge planning discussed with[de-identified] Patient and spouse-Paloma  [place] of Choice: Yes  Comments - Freedom of Choice: FOC discussed regarding therapy recs for IP rehab  Family and pt declining, in favor of home with [facility]-no preference in agency at this time  CM contacted family/caregiver?: Yes  Were Treatment Team discharge recommendations reviewed with patient/caregiver?: Yes  Did patient/caregiver verbalize understanding of patient care needs?: Yes  Were patient/caregiver advised of the risks associated with not following Treatment Team discharge recommendations?: Yes    Contacts  Patient Contacts: Patient and spouse-Paloma  Relationship to Patient[de-identified] Family  Contact Method:  In Person  Reason/Outcome: Continuity of Care, Referral, Discharge [address]         Is the patient interested in [place] [address] at discharge?: Yes  [address] requested[de-identified] Physical Therapy, Occupational Therapy, [address] Name[de-identified] Other  [address] [place] [address] Provider[de-identified] PCP  Home Health Services Needed[de-identified] Evaluate Functional Status and Safety, Gait/ADL Training, Strengthening/Theraputic Exercises to Improve Function, Wound/Ostomy Care  Homebound Criteria Met[de-identified] Requires the Assistance of Another Person for Safe Ambulation or to Leave the Home, Uses an Assist Device (i e  cane, walker, etc)  Supporting Clincal Findings[de-identified] Limited Endurance, Fatigues Easliy in [employer]    Other Referral/Resources/Interventions Provided:  Interventions: [facility]  Referral Comments: CM placed referral to several [place] [address] agencies for home PT/OT/SN    Treatment Team Recommendation: Short Term Rehab  Discharge Destination Plan[de-identified] Home with [address]

## 2022-09-02 NOTE — ASSESSMENT & PLAN NOTE
· H/o endocarditis s/p AVR in 2014  · Echo 5/22 with nml functioning valve  · Home meds include warfarin

## 2022-09-02 NOTE — PROGRESS NOTES
[facility]  Progress Note - Erick Siddiqui 1935, 81 y o  male MRN: [id number]  Unit/Bed#: S -01 Encounter: [id number]  Primary Care Provider: Meryl Taylor DO   Date and time admitted to hospital: 8/29/2022  7:32 PM    * Acute cholecystitis  Assessment & Plan  · P/w severe chest and epigastric pain  · Imaging shows gallstones but no pericholecystic inflammation on CT however RUQ US read as "equivocal for cholecystitis", CBD 6mm, no evidence of CBD stone  · HIDA scan showed nonvisualization of gallbladder, consistent with acute cholecystitis  · hyperbilirubinemia likely d/t fasting state vs  Cholecystitis  · Blood cultures negative at 72 hours  · Leukocytosis resolved  · Repeat 9/1 INR, 1 86 after 2 FFP u's  · Percutaneous cholecystostomy tube placed 9/1 - fluid culture growing g positive cocci is likely a contaminant from skin    Plan:  · Continue Zosyn, stop IVF  · Follow Blood cultures   · Follow body fluid culture and anaerobic culture  · Percutaneous cholecystostomy flushing b i d   · Continue to monitor CBC and LFTs  · Continue SubQ Heparin 40mg given INR<2  · Continue Warfarin 2 5mg to INR goal of 2 5-3 5  · Changed pain meds to PRN    PAF (paroxysmal atrial fibrillation) ([facility])  Assessment & Plan  · Warfarin managed by PCP Dr Dat Mireles of   · Dosing is 6 mg on Tuesdays and Fridays, 4 mg daily on all other days of the week  · Vitamin K 2 5 mg - total of 2 doses  · FFP - total of 4 units  · 9/2 INR 1 97    Plan:  · Warfarin 5mg to 2 0mg today  · Cont Sotalol 80 mg BID  · Continue SubQ Heparin 40mg while INR below 2  · Repeat INR a m     OBINNA (obstructive sleep apnea)  Assessment & Plan  · Recent home sleep study suggestive of OBINNA but is in process of confirming with in office polysomnography    Chronic heart failure with preserved ejection fraction (HFpEF) ([facility] )  Assessment & Plan  Wt Readings from Last 3 Encounters:   09/02/22 (!) 140 kg (308 lb 10 3 oz)   08/23/22 (!) 143 kg (314 lb 3 2 oz)   08/02/22 (!) 144 kg (317 lb)       · BNP in 300's up from prior admissions but satting well on RA and no pulmonary edema on imaging  · Echo May 2022 (while decompensated) with EF 60-% and moderately reduced systolic function, dilated RV, PASP 70 mm Hg  · Examines euvolemic  · Hold torsemide given REMA   · Cont sotalol  · Monitor daily weights       Stage 3a chronic kidney disease with elevated creatinine  Assessment & Plan  Lab Results   Component Value Date    EGFR 30 09/02/2022    EGFR 29 09/01/2022    EGFR 31 08/31/2022    CREATININE 1 95 (H) 09/02/2022    CREATININE 1 99 (H) 09/01/2022    CREATININE 1 91 (H) 08/31/2022   · Cr 1 63 on admission,baseline of approx 1 4-1 6    Plan:  · Hold home torsemide  · Encourage oral hydration, monitor off of fluids given history of CHF  · Urine retention protocol  · A m  CMP    Ventricular tachycardia ([address] )  Assessment & Plan  · ICD placed with recent interrogation in March 2022, nml functioning device    DVT (deep venous thrombosis) (HCC)  Assessment & Plan  · Hx of DVT   Plan:  · Started SubQ Heparin when INR <2  · Warfarin 2 0 mg     Essential hypertension  Assessment & Plan  · Cont satolol  · Hold torsemide in setting of REMA    S/P AVR  Assessment & Plan  · H/o endocarditis s/p AVR in 2014  · Echo 5/22 with nml functioning valve  · Home meds include warfarin    Anemia  Assessment & Plan  · Hgb 10 4 on arrival, stable  Baseline around 11    · Hgb 9 7 on 9/2AM    Plan:  · Monitor CBC    Type 2 diabetes mellitus with renal complication [facility])  Assessment & Plan    Lab Results   Component Value Date    HGBA1C 6 3 (H) 03/01/2022     · Hold Metformin, continue at discharge  · SSI a c  And HS  · Fingersticks q i d   · Diabetic diet  · Hypoglycemia protocol in place      VTE Pharmacologic Prophylaxis:   VTE Score: 5 High Risk (Score >/= 5) - Pharmacological DVT Prophylaxis Ordered: Warfarin (Coumadin)  Sequential Compression Devices Ordered      Mechanical VTE Prophylaxis in Place: Yes    Patient Centered Rounds: I have performed bedside rounds with nursing staff today  Discussions with Specialists or Other Care Team Provider: IR, GI, surgery    Education and Discussions with Family / Patient: Updated  (wife and daughter) at bedside  Current Length of Stay: 3 day(s)    Current Patient Status: Inpatient     Discharge Plan / Estimated Discharge Date: Anticipate discharge tomorrow to home  Code Status: Level 1 - Full Code      Subjective:   Mr Charli Hartley was asleep with pillows under his right side upon entering the room  He said he was not in pain overnight or this morning and felt much better following the procedure  His appetite had returned and he was able to eat the breakfast provided to him in the AM  He denied bleeding, N/V, CP, SOB, fever/chills, urination changes, and said he had passed 1BM overnight  Objective:     Vitals:   Temp (24hrs), Av 4 °F (37 4 °C), Min:97 9 °F (36 6 °C), Max:102 8 °F (39 3 °C)    Temp:  [97 9 °F (36 6 °C)-102 8 °F (39 3 °C)] 98 1 °F (36 7 °C)  HR:  [67-93] 67  Resp:  [16] 16  BP: (118-148)/(42-76) 140/73  SpO2:  [90 %-97 %] 92 %  Body mass index is 40 72 kg/m²  Input and Output Summary (last 24 hours): Intake/Output Summary (Last 24 hours) at 2022 1455  Last data filed at 2022 1311  Gross per 24 hour   Intake 360 ml   Output 400 ml   Net -40 ml       Physical Exam:     Physical Exam  Constitutional:       General: He is not in acute distress  Appearance: He is obese  He is not ill-appearing or toxic-appearing  HENT:      Head: Normocephalic and atraumatic  Mouth/Throat:      Mouth: Mucous membranes are moist    Eyes:      General: No scleral icterus  Right eye: No discharge  Left eye: No discharge  Extraocular Movements: Extraocular movements intact  Cardiovascular:      Rate and Rhythm: Normal rate and regular rhythm  Pulses: Normal pulses        Heart sounds: Murmur (holosystolic) heard  No gallop  Pulmonary:      Effort: Pulmonary effort is normal  No respiratory distress  Breath sounds: Normal breath sounds  No stridor  No wheezing or rales  Abdominal:      General: Abdomen is flat  Bowel sounds are normal  There is no distension  Palpations: Abdomen is soft  There is no mass  Tenderness: There is abdominal tenderness (RUQ)  There is no guarding  Musculoskeletal:         General: No tenderness, deformity or signs of injury  Right lower leg: Edema present  Left lower leg: Edema present  Skin:     General: Skin is warm and dry  Coloration: Skin is not jaundiced  Findings: No bruising, erythema or rash  Comments: No signs of infection or inflammation around the cholecystostomy tube site  Neurological:      General: No focal deficit present  Mental Status: He is alert and oriented to person, place, and time  Psychiatric:         Mood and Affect: Mood normal          Behavior: Behavior normal           Additional Data:     Labs:  Results from last 7 days   Lab Units 09/02/22  0528 09/01/22  0526 08/31/22  0532   WBC Thousand/uL 8 86   < > 15 97*   HEMOGLOBIN g/dL 9 7*   < > 11 2*   HEMATOCRIT % 31 4*   < > 37 0   PLATELETS Thousands/uL 169   < > 172   NEUTROS PCT %  --   --  84*   LYMPHS PCT %  --   --  5*   MONOS PCT %  --   --  10   EOS PCT %  --   --  0    < > = values in this interval not displayed       Results from last 7 days   Lab Units 09/02/22  0528   SODIUM mmol/L 143   POTASSIUM mmol/L 4 0   CHLORIDE mmol/L 108   CO2 mmol/L 25   BUN mg/dL 38*   CREATININE mg/dL 1 95*   ANION GAP mmol/L 10   CALCIUM mg/dL 8 7   ALBUMIN g/dL 3 3*   TOTAL BILIRUBIN mg/dL 4 47*   ALK PHOS U/L 61   ALT U/L 13   AST U/L 23   GLUCOSE RANDOM mg/dL 136     Results from last 7 days   Lab Units 09/02/22  0528   INR  1 97*     Results from last 7 days   Lab Units 09/02/22  1117 09/02/22  0802 09/02/22  0034 09/01/22  2148 09/01/22  1939 09/01/22  1547 09/01/22  1137 09/01/22  0513 09/01/22  0002 08/31/22  2201 08/31/22  1616 08/31/22  1122   POC GLUCOSE mg/dl 180* 146* 156* 176* 143* 144* 133 130 134 117 176* 153*         Results from last 7 days   Lab Units 08/29/22  2225   LACTIC ACID mmol/L 1 2       Imaging: None    Recent Cultures (last 7 days):     Results from last 7 days   Lab Units 09/01/22  1347 08/30/22  0601 08/30/22  0600   BLOOD CULTURE   --  No Growth at 72 hrs  No Growth at 72 hrs     GRAM STAIN RESULT  2+ Gram positive cocci in pairs and chains*  No polys seen*  --   --    BODY FLUID CULTURE, STERILE  Culture too young- will reincubate  --   --        Lines/Drains:  Invasive Devices  Report    Peripheral Intravenous Line  Duration           Peripheral IV 08/29/22 Right Arm 3 days          Drain  Duration           Cholecystostomy Tube RUQ 1 day                Telemetry:        Last 24 Hours Medication List:   Current Facility-Administered Medications   Medication Dose Route Frequency Provider Last Rate    acetaminophen  650 mg Oral Q6H PRN Lori Hernandez, PA-C      atorvastatin  40 mg Oral After [employer], PA-C      gabapentin  100 mg Oral Daily Lori Hernandez, PA-C      heparin (porcine)  5,000 Units Subcutaneous Q12H [address] Jeannine Arnold MD      HYDROcodone-acetaminophen  1 tablet Oral Q6H PRN Lori Hernandez, PA-C      HYDROmorphone  0 5 mg Intravenous Q6H PRN Lori Hernandez, PA-C      HYDROmorphone  0 2 mg Intravenous Q4H PRN Lori Hernandez, PA-C      insulin lispro  2-12 Units Subcutaneous 4x Daily (AC & HS) Jeannine Arnold MD      iron polysaccharides  150 mg Oral Daily Lori Hernandez, PA-C      pantoprazole  40 mg Oral Early Morning Lori Hernandez, PA-C      piperacillin-tazobactam  3 375 g Intravenous Q6H Jeannine Arnold MD 3 375 g (09/02/22 1311)    polyethylene glycol  17 g Oral Daily Jeannine Arnold MD      potassium chloride  20 mEq Oral BID Santana Crook PA-C      sotalol  80 mg Oral Q12H Santana Crook PA-C      warfarin  2 mg Oral Daily (warfarin) Dylon Hale MD          Today, Patient Was Seen By: Betty Mathew MD    ** Please Note: This note has been constructed using a voice recognition system   **

## 2022-09-02 NOTE — PHYSICAL THERAPY NOTE
PHYSICAL THERAPY EVALUATION NOTE    Patient Name: Alfred Holm  GWDKM'T Date: 9/2/2022  AGE:   81 y o  Mrn:   [id number]  ADMIT DX:  Acute cholecystitis [K81 0]  Chest pain [R07 9]    Past Medical History:   Diagnosis Date    Anemia     Arthritis     Atrial fibrillation ([facility] )     Basal cell carcinoma 03/22/2022    Tip of Nose    CHF (congestive heart failure) (HCC)     Diabetes mellitus (HCC)     Niddm    DVT (deep vein thrombosis) in pregnancy 1966    not in pregnancy    DVT (deep venous thrombosis) ([facility] ) 1966    Dyslipidemia     GERD (gastroesophageal reflux disease)     Hyperlipidemia     Hypertension     Irregular heart beat     Afib    Morbid obesity due to excess calories ([place] [address] )     Resolved 9/2/2014     Pulmonary embolism (HCC)     Sepsis ([place] [address] )     Squamous cell skin cancer 07/30/2020    Left posterior scalp    Visual impairment      Length Of Stay: 3  PHYSICAL THERAPY EVALUATION :    09/02/22 1429   PT Last Visit   PT Visit Date 09/02/22   Pain Assessment   Pain Assessment Tool 0-10   Pain Score 4   Pain Location/Orientation Orientation: Right;Location: Abdomen   Restrictions/Precautions   Other Precautions Chair Alarm; Bed Alarm;Multiple lines; Fall Risk;Pain;Hard of hearing  ([place])   Home Living   Type of [address] [place] [address] Two level; Able to live on main level with bedroom/bathroom; Other (Comment)  (no RADHA)   Additional Comments lives w/ spouse and family  ambulates w/ cane  owns rollator  independent w/ ADLs  no falls in last 6 months  General   Additional Pertinent History 8/31/22 at 22:02 blood pressure was 102/39   Family/Caregiver Present Yes   Cognition   Arousal/Participation Cooperative   Orientation Level Oriented to person; Other (Comment)  (pt was identified w/ full name, birth date)   Following Commands Follows one step commands with increased time or repetition   Comments room air resting pulse ox 97% and 73 BPM, active 94% and 85 BPM    Subjective   Subjective pt seen sitting out of bed w/ spouse present  pt agreed to PT eval  pt states having right sided abdominal pain  RUE Assessment   RUE Assessment WFL   LUE Assessment   LUE Assessment WFL   RLE Assessment   RLE Assessment X  (3+ to 4-/5, hip flexion 3-/5)   LLE Assessment   LLE Assessment X  (3+ to 4-/5, hip flexion 3-/5)   Vision-Basic Assessment   Current Vision Wears glasses all the time   Light Touch   RLE Light Touch Grossly intact   LLE Light Touch Grossly intact   Bed Mobility   Additional Comments pt was incontinent of urine while ambulating to the bathroom and had to be cleaned  Transfers   Sit to Stand 3  Moderate assistance   Additional items Assist x 1; Increased time required;Verbal cues  (for LE positioning)   Stand to Sit 4  Minimal assistance   Additional items Assist x 1; Increased time required;Verbal cues  (for body positioning)   Toilet transfer 4  Minimal assistance   Additional items Standard toilet; Increased time required;Verbal cues  (for body positioning, grab bar use)   Ambulation/Elevation   Gait pattern Wide ROWENA; Decreased foot clearance; Short stride; Excessively slow   Gait Assistance 3  Moderate assist   Additional items Assist x 1;Verbal cues  (for cane positioning)   Assistive Device [address]   Distance 20 feet  (additional not possible due to fatigue and dyspnea)   Balance   Static Sitting Fair +   Static Standing Poor +  (w/ cane)   Ambulatory Poor  (w/ cane)   Activity Tolerance   Activity Tolerance Patient limited by fatigue;Patient limited by pain   Nurse Made Aware spoke to Grace Stone    Assessment   Problem List Decreased strength;Decreased range of motion; Impaired balance;Decreased endurance;Decreased mobility; Decreased safety awareness; Impaired hearing;Obesity   Assessment Pt presents with epigastric/chest pain   Dx: acute cholecystitis, anemia, chronic heart failure, a-fib, and DM  9/1/22 IR percutaneous cholecystostomy tube placement  order placed for PT eval and tx  pt presents w/ comorbidities of anemia, arthritis, CHF, DM, dyslipidemia, DVT, HTN, a-fib, PE, morbid obesity, and AVR and personal factors of advanced age and hearing impairments  pt presents w/ pain, weakness, decreased ROM, decreased endurance, impaired balance, gait deviations, decreased safety awareness and fall risk  these impairments are evident in findings from physical examination (weakness and decreased ROM), mobility assessment (need for min to mod assist w/ all phases of mobility when usually mobilizing independently, tolerance to only 20 feet of ambulation and need for cueing for mobility technique), and Barthel Index: 20/100  pt needed input for task focus and mobility technique  pt is at risk for falls due to physical and safety awareness deficits  pt's clinical presentation is unstable/unpredictable (evident in poor blood pressure control, need for assist w/ all phases of mobility when usually mobilizing independently, tolerance to only 20 feet of ambulation and need for input for mobility technique/safety)  pt needs inpatient PT tx to improve mobility deficits and progress mobility training as appropriate  discharge recommendation is for inpatient rehab to reduce fall risk and maximize level of functional independence  Goals   Patient Goals go home  go back to work  STG Expiration Date 09/12/22   Short Term Goal #1 pt will:  Increase bilateral LE strength 1/2 grade to facilitate independent mobility, Perform bed mobility modified independent to increase level of independence, Perform all transfers modified independent to improve independence, Ambulate 150 ft  with least restrictive assistive device w/ supervision w/o LOB to improve functional independence and expedite eventual return to work, Increase all balance 1 grade to decrease risk for falls, Complete exercise program independently to increase strength and endurance, Tolerate 3 hr OOB to faciliate upright tolerance, Improve Barthel Index score to 70 or greater to facilitate independence and Complete Timed Up and Go or Comfortable Gait Speed to further assess mobility and monitor progress   PT Treatment Day 1   Plan   Treatment/Interventions Functional transfer training;LE strengthening/ROM; Therapeutic exercise; Endurance training;Bed mobility; Equipment eval/education;Gait training;Patient/family training   PT Frequency Other (Comment)  (5x/week)   Recommendation   PT Discharge Recommendation Post acute rehabilitation services   Additional Comments recommend bariatric roller walker use w/ mobility   AM-PAC Basic Mobility Inpatient   Turning in Bed Without Bedrails 3   Lying on Back to Sitting on Edge of Flat Bed 2   Moving Bed to Chair 2   Standing Up From Chair 2   Walk in Room 2   Climb 3-5 Stairs 1   Basic Mobility Inpatient Raw Score 12   Basic Mobility Standardized Score 32 23   Highest Level Of Mobility   -[facility] Goal 4: Move to chair/commode   -HL Achieved 7: Walk 25 feet or more   Barthel Index   Feeding 10   Bathing 0   Grooming Score 5   Dressing Score 5   Bladder Score 5   Bowels Score 10   Toilet Use Score 5   Transfers (Bed/Chair) Score 10   Mobility (Level Surface) Score 0   Stairs Score 0   Barthel Index Score 50   Additional Treatment Session   Start Time 1429   End Time 1439   Treatment Assessment Pt agreed to participate in PT intervention  Therapist introduced baratric roller walker use w/ ambulation to improve gait stability  Sit <---> stand transfer w/ minx1  Ambulated 20 feet, 30 feet w/ bariatric roller walker w/ min to modx1 (increased level of assistance was needed when turning)  Seated rest break was required  Additional ambulation was not possible due to fatigue and dyspnea  Pt was noted to have improvement w/ use of bariatric roller walker w/ increased ambulation distance and decreased level of assist to maintain safety   continued inpatient PT tx is indicated to reduce fall risk  Equipment Use bariatric roller walker   Additional Treatment Day 1   End of Consult   Patient Position at End of Consult Bedside chair;Bed/Chair alarm activated; All needs within reach     The patient's AM-PAC Basic Mobility Inpatient Short Form Raw Score is 12  A Raw score of less than or equal to 16 suggests the patient may benefit from discharge to post-acute rehabilitation services  Please also refer to the recommendation of the Physical Therapist for safe discharge planning  Skilled PT recommended while in hospital and upon DC to progress pt toward treatment goals       Jackie Kennedy, PT

## 2022-09-02 NOTE — ASSESSMENT & PLAN NOTE
Wt Readings from Last 3 Encounters:   09/02/22 (!) 140 kg (308 lb 10 3 oz)   08/23/22 (!) 143 kg (314 lb 3 2 oz)   08/02/22 (!) 144 kg (317 lb)       · BNP in 300's up from prior admissions but satting well on RA and no pulmonary edema on imaging  · Echo May 2022 (while decompensated) with EF 60-% and moderately reduced systolic function, dilated RV, PASP 70 mm Hg  · Examines euvolemic  · Hold torsemide given REMA   · Cont sotalol  · Monitor daily weights

## 2022-09-02 NOTE — DISCHARGE SUMMARY
[facility]  Discharge- Raynald  1935, 81 y o  male MRN: [id number]  Unit/Bed#: S -01 Encounter: [id number]  Primary Care Provider: Roxane Vallecillo DO   Date and time admitted to hospital: 8/29/2022  7:32 PM    * Acute cholecystitis  Assessment & Plan  · P/w severe chest and epigastric pain  · Imaging shows gallstones but no pericholecystic inflammation on CT however RUQ US read as "equivocal for cholecystitis", CBD 6mm, no evidence of CBD stone  · HIDA scan showed nonvisualization of gallbladder, consistent with acute cholecystitis  · hyperbilirubinemia likely d/t fasting state vs  Cholecystitis  · Blood cultures negative at 72 hours  · Leukocytosis resolved  · Repeat 9/1 INR, 1 86 after 2 FFP u's  · Percutaneous cholecystostomy tube placed 9/1 - fluid culture growing g positive cocci is likely a contaminant from skin    Plan:  · Augmentin for 1 week  · Follow Blood cultures   · Home VNA for drain care  · Percutaneous cholecystostomy flushing b i d   · Follow-up with surgery in 2 weeks    Chronic heart failure with preserved ejection fraction (HFpEF) ([facility])  Assessment & Plan  Wt Readings from Last 3 Encounters:   09/03/22 (!) 144 kg (317 lb 10 9 oz)   08/23/22 (!) 143 kg (314 lb 3 2 oz)   08/02/22 (!) 144 kg (317 lb)       Resume home torsemide    Ventricular tachycardia ([address] )  Assessment & Plan  · ICD placed with recent interrogation in March 2022, nml functioning device    DVT (deep venous thrombosis) ([facility])  Assessment & Plan  · Hx of DVT   Plan:  · Warfarin 2 0 mg   · See plan for AFib    PAF (paroxysmal atrial fibrillation) ([facility])  Assessment & Plan  · Warfarin managed by PCP Dr Chu Barrios of   · Dosing is 6 mg on Tuesdays and Fridays, 4 mg daily on all other days of the week  · Vitamin K 2 5 mg - total of 2 doses  · FFP - total of 4 units  · 9/2 INR 1 97    Plan:  · Warfarin 2 mg once daily  · Check PT INR in 3 days  · Follow-up with primary care provider to adjust    Type 2 diabetes mellitus with renal complication [facility])  Assessment & Plan    Lab Results   Component Value Date    HGBA1C 6 3 (H) 03/01/2022     · Continue home metformin    Medical Problems             Resolved Problems  Date Reviewed: 9/2/2022   None               Discharging Resident: Milo Lira MD  Discharging Attending: Cleo Leonard MD  PCP: Reyes Buhl, DO  Admission Date:   Admission Orders (From admission, onward)     Ordered        08/30/22 1433  Inpatient Admission  Once            08/30/22 0314  Place in Observation  Once                      Discharge Date: 09/03/22    Consultations During Hospital Stay:  · Interventional Radiology  · General Surgery  · Gastroenterology    Procedures Performed:   · Percutaneous cholecystostomy by IR - successful placement of percutaneous cholecystostomy tube into stone filled gallbladder  Recovered to bid, dark brown, slightly thick bilious material which was sent for culture  Significant Findings / Test Results:   XR chest 1 view portable    Result Date: 8/30/2022  Impression: Stable cardiomegaly and mild central congestion  Workstation performed: [id number]     NM hepatobiliary/HIDA scan    Result Date: 8/30/2022  Impression: 1  Nonvisualization of the gallbladder despite morphine administration  Findings consistent with acute cholecystitis in the appropriate clinical setting, see discussion above  2   Patent common bile duct  The study was marked in [facility] for immediate notification  Workstation performed: [id number]     IR cholecystostomy tube placement    Result Date: 9/1/2022  Impression: Technically successful placement of percutaneous cholecystostomy tube as noted  40 mL turbid brown bile was collected  Specimen sent for microbiologic analysis  Gallstones present   Workstation performed: [id number]     CTA dissection protocol chest/abdomen/pelvis    Result Date: 8/29/2022  Impression: No aortic aneurysm or dissection  No acute inflammatory process  Cholelithiasis without evidence of acute cholecystitis  Additional nonacute findings, as described  Workstation performed: [id number]     US right upper quadrant    Result Date: 8/30/2022  Impression: 1  Cholelithiasis with mild wall thickening and trace pericholecystic fluid  No evidence of sonographic Patel's sign  The findings are equivocal for cholecystitis  Correlate with LFTs  HIDA scan may be considered as clinically appropriate  2   Hepatomegaly  3   Trace perihepatic fluid  Workstation performed: [id number]     Incidental Findings:   · None     Test Results Pending at Discharge (will require follow up): · Fluid culture  · Anaerobic cultures - can be followed with PCP     Outpatient Tests Requested:  · PT INR in 3 days    Complications:  None    Reason for Admission:  Epigastric and chest pain    Hospital Course: Travis Pedro is a 81 y o  male patient who originally presented to the hospital on 8/29/2022 due to  chief complaint of epigastric and chest pain  Patient underwent cardiac workup including high sensitivity troponins, EKG, and CTA which were negative for any acute signs MI or dissection  Right upper quadrant ultrasound demonstrated gallstones and given equivocal results patient underwent HIDA scan which was consistent with acute cholecystitis  Patient managed with IV antibiotics Zosyn and monitored with daily chemistries and cell counts  Patient underwent percutaneous cholecystostomy tube placement for drainage performed by IR, and fluid was sent for cultures  Patient discharged with home PT/OT/VNA and antibiotics x1 week  Patient will follow-up with surgery department in 2 weeks  The patient's home warfarin dose was decreased with plan for checking PT INR in 3 days with adjustment per patient's PCP  Incidentally, on presentation patient noted to have elevated total bilirubin which was predominantly indirect bilirubin    Patient was evaluated by Gastroenterology  Etiology unknown, possible multifactorial including component of benign conjugation disorder, fasting state prior to IR intervention or other etiology  Bilirubin was down trending on day of discharge  Please see above list of diagnoses and related plan for additional information  Condition at Discharge: good    Discharge Day Visit / Exam:   Subjective:     Patient denies any physical complaints today  There was an episode last night where pulse ox registered heart rate in 30s  EKG was performed which demonstrated heart rate in 50s to 60s with activation of ICD  Patient did remain asymptomatic throughout this time denying any chest pain, palpitations, lightheadedness/dizziness, fatigue, confusion  Patient denies any abdominal pain today  Patient states his last bowel movement was early this morning but he feels that he has another one coming  No issues with urination  Vitals: Blood Pressure: 124/57 (09/03/22 0737)  Pulse: 57 (09/03/22 0737)  Temperature: 98 2 °F (36 8 °C) (09/03/22 0737)  Temp Source: Oral (09/01/22 2125)  Respirations: 18 (09/03/22 0737)  Height: 6' 1" (185 4 cm) (08/30/22 1613)  Weight - Scale: (!) 144 kg (317 lb 10 9 oz) (09/03/22 0511)  SpO2: 97 % (09/03/22 0737)  Exam:   Physical Exam  Constitutional:       General: He is not in acute distress  Appearance: He is obese  He is not ill-appearing or toxic-appearing  HENT:      Head: Normocephalic and atraumatic  Mouth/Throat:      Mouth: Mucous membranes are moist    Eyes:      General: No scleral icterus  Right eye: No discharge  Left eye: No discharge  Extraocular Movements: Extraocular movements intact  Cardiovascular:      Rate and Rhythm: Normal rate and regular rhythm  Pulses: Normal pulses  Heart sounds: Murmur (holosystolic) heard  No gallop  Pulmonary:      Effort: Pulmonary effort is normal  No respiratory distress        Breath sounds: Normal breath sounds  No stridor  No wheezing or rales  Abdominal:      General: Abdomen is flat  Bowel sounds are normal  There is no distension  Palpations: Abdomen is soft  There is no mass  Tenderness: There is abdominal tenderness  There is no guarding  Comments:   Denies pain at rest but doses mild tenderness to palpation of right upper quadrant around insertion site    Cholecystostomy tube place with dressing draining approximately 30 mL of dark fluid   Musculoskeletal:         General: No tenderness, deformity or signs of injury  Right lower leg: Edema present  Left lower leg: Edema present  Skin:     General: Skin is warm and dry  Coloration: Skin is not jaundiced  Findings: No bruising, erythema or rash  Comments: No signs of infection or inflammation around the cholecystostomy tube site  Neurological:      General: No focal deficit present  Mental Status: He is alert and oriented to person, place, and time  Psychiatric:         Mood and Affect: Mood normal          Behavior: Behavior normal          Discussion with Family: Updated  (wife) at bedside  Discharge instructions/Information to patient and family:   See after visit summary for information provided to patient and family  Provisions for Follow-Up Care:  See after visit summary for information related to follow-up care and any pertinent home health orders  Disposition:   Home with VNA Services (Reminder: Complete face to face encounter)    Planned Readmission: None    Discharge Medications:  See after visit summary for reconciled discharge medications provided to patient and/or family        **Please Note: This note may have been constructed using a voice recognition system**

## 2022-09-02 NOTE — PROGRESS NOTES
Progress Note - General Surgery   Travis Pedro 81 y o  male MRN: [id number]  Unit/Bed#: S -01 Encounter: [id number]    Assessment:  80 yo M with acute cholecystitis s/p IR percutaneous cholecystostomy tube placement w/turbid, dark brown, bilious material on 9/1      Plan:  - Monitor perc jeny tube output and character  - Strict I/Os  - Continue abx  - Diet as tolerated  - Pain and nausea control PRN  - Rest of care per primary team  - trend TBili, white count  Monitor for fevers    Rest of care per primary    Subjective/Objective     Subjective:   Patient states his pain is overall improved  Perc jeny with bilious drainage per bag  Resting comfortably in NAD    Objective:     Blood pressure 140/73, pulse 67, temperature 98 1 °F (36 7 °C), resp  rate 16, height 6' 1" (1 854 m), weight (!) 140 kg (308 lb 10 3 oz), SpO2 92 %  ,Body mass index is 40 72 kg/m²        Intake/Output Summary (Last 24 hours) at 9/2/2022 0944  Last data filed at 9/2/2022 1976  Gross per 24 hour   Intake 520 ml   Output 400 ml   Net 120 ml       Invasive Devices  Report    Peripheral Intravenous Line  Duration           Peripheral IV 08/29/22 Right Arm 3 days          Drain  Duration           Cholecystostomy Tube RUQ <1 day              Physical Exam:  General - no acute distress, responsive and cooperative  CV - warm, regular rate  Pulm - normal work of breathing, no respiratory distress  Abd - soft, protuberant, distended, tender RUQ and diffusely, per jeny drain  Neuro - m/s grossly intact, cn grossly intact  Ext - moving all extremities          Scheduled Meds:  Current Facility-Administered Medications   Medication Dose Route Frequency Provider Last Rate    acetaminophen  650 mg Oral Q6H PRN Jasmin Hernandez PA-C      atorvastatin  40 mg Oral After [employer]      gabapentin  100 mg Oral Daily Jasmin Hernandez PA-C      heparin (porcine)  5,000 Units Subcutaneous Q12H [address] Iza Dewitt MD  HYDROcodone-acetaminophen  1 tablet Oral Q6H PRN Miguel Angel Sep, PA-C      HYDROmorphone  0 5 mg Intravenous Q6H PRN Miguel Angel Sep, PA-C      HYDROmorphone  0 2 mg Intravenous Q4H PRN Miguel Angel Sep, PA-C      insulin lispro  2-12 Units Subcutaneous 4x Daily (AC & HS) Jeneane Given, MD      iron polysaccharides  150 mg Oral Daily Miguel Angel Sep, PA-C      pantoprazole  40 mg Oral Early Morning Miguel Angel Sep, PA-C      piperacillin-tazobactam  3 375 g Intravenous Q6H Nikolai Given, MD 3 375 g (09/02/22 0538)    polyethylene glycol  17 g Oral Daily Biancaeane Given, MD      potassium chloride  20 mEq Oral BID Miguel Angel Sep, PA-NAZARIO      sotalol  80 mg Oral Q12H Clari Pichardo PA-C      warfarin  5 mg Oral Daily (warfarin) Nikolai Given, MD       Continuous Infusions:   PRN Meds:   acetaminophen    HYDROcodone-acetaminophen    HYDROmorphone    HYDROmorphone      Lab, Imaging and other studies:  I have personally reviewed pertinent lab results    , CBC:   Lab Results   Component Value Date    WBC 8 86 09/02/2022    HGB 9 7 (L) 09/02/2022    HCT 31 4 (L) 09/02/2022    MCV 90 09/02/2022     09/02/2022    MCH 27 7 09/02/2022    MCHC 30 9 (L) 09/02/2022    RDW 17 2 (H) 09/02/2022    MPV 10 6 09/02/2022   , CMP:   Lab Results   Component Value Date    SODIUM 143 09/02/2022    K 4 0 09/02/2022     09/02/2022    CO2 25 09/02/2022    BUN 38 (H) 09/02/2022    CREATININE 1 95 (H) 09/02/2022    CALCIUM 8 7 09/02/2022    AST 23 09/02/2022    ALT 13 09/02/2022    ALKPHOS 61 09/02/2022    EGFR 30 09/02/2022   , Coagulation:   Lab Results   Component Value Date    INR 1 97 (H) 09/02/2022   , Urinalysis: No results found for: Seymour Ana, SPECGRAV, PHUR, LEUKOCYTESUR, NITRITE, PROTEINUA, GLUCOSEU, KETONESU, BILIRUBINUR, BLOODU  VTE Pharmacologic Prophylaxis: Reason for no pharmacologic prophylaxis supratherapeutic INR  VTE Mechanical Prophylaxis: sequential compression device      Maxime Membreno MD  9/2/2022 9:44 AM

## 2022-09-02 NOTE — ASSESSMENT & PLAN NOTE
· P/w severe chest and epigastric pain  · Imaging shows gallstones but no pericholecystic inflammation on CT however RUQ US read as "equivocal for cholecystitis", CBD 6mm, no evidence of CBD stone  · HIDA scan showed nonvisualization of gallbladder, consistent with acute cholecystitis  · hyperbilirubinemia likely d/t fasting state vs  Cholecystitis  · Blood cultures negative at 72 hours  · Leukocytosis resolved  · Repeat 9/1 INR, 1 86 after 2 FFP u's  · Percutaneous cholecystostomy tube placed 9/1 - fluid culture growing g positive cocci is likely a contaminant from skin    Plan:  · Continue Zosyn, stop IVF  · Follow Blood cultures   · Follow body fluid culture and anaerobic culture  · Percutaneous cholecystostomy flushing b i d   · Continue to monitor CBC and LFTs  · Continue SubQ Heparin 40mg given INR<2  · Continue Warfarin 2 5mg to INR goal of 2 5-3 5  · Changed pain meds to PRN

## 2022-09-02 NOTE — ASSESSMENT & PLAN NOTE
Lab Results   Component Value Date    HGBA1C 6 3 (H) 03/01/2022     · Hold Metformin, continue at discharge  · SSI a c   And HS  · Fingersticks q i d   · Diabetic diet  · Hypoglycemia protocol in place

## 2022-09-02 NOTE — PLAN OF CARE
Problem: Potential for Falls  Goal: Patient will remain free of falls  Description: INTERVENTIONS:  - Educate patient/family on patient safety including physical limitations  - Instruct patient to call for assistance with activity   - Consult OT/PT to assist with strengthening/mobility   - Keep Call bell within reach  - Keep bed low and locked with side rails adjusted as appropriate  - Keep care items and personal belongings within reach  - Initiate and maintain comfort rounds  - Make Fall Risk Sign visible to staff  - Offer Toileting every 2 Hours, in advance of need  - Initiate/Maintain bed alarm  - Obtain necessary fall risk management equipment: Alarms  - Apply yellow socks and bracelet for high fall risk patients  - Consider moving patient to room near nurses station  Outcome: Progressing     Problem: MOBILITY - ADULT  Goal: Maintain or return to baseline ADL function  Description: INTERVENTIONS:  -  Assess patient's ability to carry out ADLs; assess patient's baseline for ADL function and identify physical deficits which impact ability to perform ADLs (bathing, care of mouth/teeth, toileting, grooming, dressing, etc )  - Assess/evaluate cause of self-care deficits   - Assess range of motion  - Assess patient's mobility; develop plan if impaired  - Assess patient's need for assistive devices and provide as appropriate  - Encourage maximum independence but intervene and supervise when necessary  - Involve family in performance of ADLs  - Assess for home care needs following discharge   - Consider OT consult to assist with ADL evaluation and planning for discharge  - Provide patient education as appropriate  Outcome: Progressing  Goal: Maintains/Returns to pre admission functional level  Description: INTERVENTIONS:  - Perform BMAT or MOVE assessment daily    - Set and communicate daily mobility goal to care team and patient/family/caregiver     - Collaborate with rehabilitation services on mobility goals if consulted  - Out of bed to chair 3 times a day   - Out of bed for meals 3 times a day  - Out of bed for toileting  - Record patient progress and toleration of activity level   Outcome: Progressing     Problem: PAIN - ADULT  Goal: Verbalizes/displays adequate comfort level or baseline comfort level  Description: Interventions:  - Encourage patient to monitor pain and request assistance  - Assess pain using appropriate pain scale  - Administer analgesics based on type and severity of pain and evaluate response  - Implement non-pharmacological measures as appropriate and evaluate response  - Consider cultural and social influences on pain and pain management  - Notify physician/advanced practitioner if interventions unsuccessful or patient reports new pain  Outcome: Progressing     Problem: INFECTION - ADULT  Goal: Absence or prevention of progression during hospitalization  Description: INTERVENTIONS:  - Assess and monitor for signs and symptoms of infection  - Monitor lab/diagnostic results  - Monitor all insertion sites, i e  indwelling lines, tubes, and drains  - Monitor endotracheal if appropriate and nasal secretions for changes in amount and color  - [place] appropriate cooling/warming therapies per order  - Administer medications as ordered  - Instruct and encourage patient and family to use good hand hygiene technique  - Identify and instruct in appropriate isolation precautions for identified infection/condition  Outcome: Progressing     Problem: DISCHARGE PLANNING  Goal: Discharge to home or other facility with appropriate resources  Description: INTERVENTIONS:  - Identify barriers to discharge w/patient and caregiver  - Arrange for needed discharge resources and transportation as appropriate  - Identify discharge learning needs (meds, wound care, etc )  - Arrange for interpretive services to assist at discharge as needed  - Refer to Case Management Department for coordinating discharge planning if the patient needs post-hospital services based on physician/advanced practitioner order or complex needs related to functional status, cognitive ability, or social support system  Outcome: Progressing     Problem: Knowledge Deficit  Goal: Patient/family/caregiver demonstrates understanding of disease process, treatment plan, medications, and discharge instructions  Description: Complete learning assessment and assess knowledge base  Interventions:  - Provide teaching at level of understanding  - Provide teaching via preferred learning methods  Outcome: Progressing     Problem: Nutrition/Hydration-ADULT  Goal: Nutrient/Hydration intake appropriate for improving, restoring or maintaining nutritional needs  Description: Monitor and assess patient's nutrition/hydration status for malnutrition  Collaborate with interdisciplinary team and initiate plan and interventions as ordered  Monitor patient's weight and dietary intake as ordered or per policy  Utilize nutrition screening tool and intervene as necessary  Determine patient's food preferences and provide high-protein, high-caloric foods as appropriate       INTERVENTIONS:  - Monitor oral intake, urinary output, labs, and treatment plans  - Assess nutrition and hydration status and recommend course of action  - Evaluate amount of meals eaten  - Assist patient with eating if necessary   - Allow adequate time for meals  - Recommend/ encourage appropriate diets, oral nutritional supplements, and vitamin/mineral supplements  - Order, calculate, and assess calorie counts as needed  - Recommend, monitor, and adjust tube feedings and TPN/PPN based on assessed needs  - Assess need for intravenous fluids  - Provide specific nutrition/hydration education as appropriate  - Include patient/family/caregiver in decisions related to nutrition  Outcome: Progressing

## 2022-09-02 NOTE — ASSESSMENT & PLAN NOTE
· Warfarin managed by PCP Dr Yaniv Gerber of   · Dosing is 6 mg on Tuesdays and Fridays, 4 mg daily on all other days of the week  · Vitamin K 2 5 mg - total of 2 doses  · FFP - total of 4 units  · 9/2 INR 1 97    Plan:  · Warfarin 5mg to 2 0mg today  · Cont Sotalol 80 mg BID  · Continue SubQ Heparin 40mg while INR below 2  · Repeat INR a m

## 2022-09-02 NOTE — PLAN OF CARE
Problem: PHYSICAL THERAPY ADULT  Goal: Performs mobility at highest level of function for planned discharge setting  See evaluation for individualized goals  Description: Treatment/Interventions: Functional transfer training, LE strengthening/ROM, Therapeutic exercise, Endurance training, Bed mobility, Equipment eval/education, Gait training, Patient/family training          See flowsheet documentation for full assessment, interventions and recommendations  Note:    Problem List: Decreased strength, Decreased range of motion, Impaired balance, Decreased endurance, Decreased mobility, Decreased safety awareness, Impaired hearing, Obesity  Assessment: Pt presents with epigastric/chest pain  Dx: acute cholecystitis, anemia, chronic heart failure, a-fib, and DM  9/1/22 IR percutaneous cholecystostomy tube placement  order placed for PT eval and tx  pt presents w/ comorbidities of anemia, arthritis, CHF, DM, dyslipidemia, DVT, HTN, a-fib, PE, morbid obesity, and AVR and personal factors of advanced age and hearing impairments  pt presents w/ pain, weakness, decreased ROM, decreased endurance, impaired balance, gait deviations, decreased safety awareness and fall risk  these impairments are evident in findings from physical examination (weakness and decreased ROM), mobility assessment (need for min to mod assist w/ all phases of mobility when usually mobilizing independently, tolerance to only 20 feet of ambulation and need for cueing for mobility technique), and Barthel Index: 20/100  pt needed input for task focus and mobility technique  pt is at risk for falls due to physical and safety awareness deficits  pt's clinical presentation is unstable/unpredictable (evident in poor blood pressure control, need for assist w/ all phases of mobility when usually mobilizing independently, tolerance to only 20 feet of ambulation and need for input for mobility technique/safety)   pt needs inpatient PT tx to improve mobility deficits and progress mobility training as appropriate  discharge recommendation is for inpatient rehab to reduce fall risk and maximize level of functional independence  PT Discharge Recommendation: Post acute rehabilitation services    See flowsheet documentation for full assessment

## 2022-09-02 NOTE — ASSESSMENT & PLAN NOTE
Lab Results   Component Value Date    EGFR 30 09/02/2022    EGFR 29 09/01/2022    EGFR 31 08/31/2022    CREATININE 1 95 (H) 09/02/2022    CREATININE 1 99 (H) 09/01/2022    CREATININE 1 91 (H) 08/31/2022   · Cr 1 63 on admission,baseline of approx 1 4-1 6    Plan:  · Hold home torsemide  · Encourage oral hydration, monitor off of fluids given history of CHF  · Urine retention protocol  · A m  CMP

## 2022-09-03 ENCOUNTER — HOME HEALTH ADMISSION (OUTPATIENT)
Dept: HOME HEALTH SERVICES | Facility: HOME HEALTHCARE | Age: 87
End: 2022-09-03
Payer: MEDICARE

## 2022-09-03 VITALS
SYSTOLIC BLOOD PRESSURE: 124 MMHG | HEIGHT: 73 IN | HEART RATE: 57 BPM | OXYGEN SATURATION: 97 % | RESPIRATION RATE: 18 BRPM | TEMPERATURE: 98.2 F | BODY MASS INDEX: 41.75 KG/M2 | DIASTOLIC BLOOD PRESSURE: 57 MMHG | WEIGHT: 315 LBS

## 2022-09-03 LAB
ALBUMIN SERPL BCP-MCNC: 3.4 G/DL (ref 3.5–5)
ALP SERPL-CCNC: 73 U/L (ref 34–104)
ALT SERPL W P-5'-P-CCNC: 21 U/L (ref 7–52)
ANION GAP SERPL CALCULATED.3IONS-SCNC: 12 MMOL/L (ref 4–13)
AST SERPL W P-5'-P-CCNC: 40 U/L (ref 13–39)
BILIRUB SERPL-MCNC: 3.31 MG/DL (ref 0.2–1)
BUN SERPL-MCNC: 36 MG/DL (ref 5–25)
CALCIUM ALBUM COR SERPL-MCNC: 9.2 MG/DL (ref 8.3–10.1)
CALCIUM SERPL-MCNC: 8.7 MG/DL (ref 8.4–10.2)
CHLORIDE SERPL-SCNC: 105 MMOL/L (ref 96–108)
CO2 SERPL-SCNC: 20 MMOL/L (ref 21–32)
CREAT SERPL-MCNC: 1.76 MG/DL (ref 0.6–1.3)
ERYTHROCYTE [DISTWIDTH] IN BLOOD BY AUTOMATED COUNT: 16.8 % (ref 11.6–15.1)
GFR SERPL CREATININE-BSD FRML MDRD: 34 ML/MIN/1.73SQ M
GLUCOSE SERPL-MCNC: 141 MG/DL (ref 65–140)
GLUCOSE SERPL-MCNC: 147 MG/DL (ref 65–140)
GLUCOSE SERPL-MCNC: 204 MG/DL (ref 65–140)
HCT VFR BLD AUTO: 32.4 % (ref 36.5–49.3)
HGB BLD-MCNC: 9.8 G/DL (ref 12–17)
INR PPP: 1.92 (ref 0.84–1.19)
MCH RBC QN AUTO: 27.7 PG (ref 26.8–34.3)
MCHC RBC AUTO-ENTMCNC: 30.2 G/DL (ref 31.4–37.4)
MCV RBC AUTO: 92 FL (ref 82–98)
PLATELET # BLD AUTO: 178 THOUSANDS/UL (ref 149–390)
PMV BLD AUTO: 10.4 FL (ref 8.9–12.7)
POTASSIUM SERPL-SCNC: 3.7 MMOL/L (ref 3.5–5.3)
PROT SERPL-MCNC: 6.4 G/DL (ref 6.4–8.4)
PROTHROMBIN TIME: 22.2 SECONDS (ref 11.6–14.5)
RBC # BLD AUTO: 3.54 MILLION/UL (ref 3.88–5.62)
SODIUM SERPL-SCNC: 137 MMOL/L (ref 135–147)
WBC # BLD AUTO: 7.03 THOUSAND/UL (ref 4.31–10.16)

## 2022-09-03 PROCEDURE — 82948 REAGENT STRIP/BLOOD GLUCOSE: CPT

## 2022-09-03 PROCEDURE — 85610 PROTHROMBIN TIME: CPT

## 2022-09-03 PROCEDURE — 99239 HOSP IP/OBS DSCHRG MGMT >30: CPT | Performed by: INTERNAL MEDICINE

## 2022-09-03 PROCEDURE — 93005 ELECTROCARDIOGRAM TRACING: CPT

## 2022-09-03 PROCEDURE — 85027 COMPLETE CBC AUTOMATED: CPT

## 2022-09-03 PROCEDURE — 80053 COMPREHEN METABOLIC PANEL: CPT

## 2022-09-03 PROCEDURE — 99232 SBSQ HOSP IP/OBS MODERATE 35: CPT | Performed by: SURGERY

## 2022-09-03 RX ORDER — AMOXICILLIN AND CLAVULANATE POTASSIUM 875; 125 MG/1; MG/1
1 TABLET, FILM COATED ORAL EVERY 12 HOURS SCHEDULED
Qty: 14 TABLET | Refills: 0 | Status: SHIPPED | OUTPATIENT
Start: 2022-09-03 | End: 2022-09-10

## 2022-09-03 RX ORDER — LANOLIN ALCOHOL/MO/W.PET/CERES
3 CREAM (GRAM) TOPICAL
Status: DISCONTINUED | OUTPATIENT
Start: 2022-09-03 | End: 2022-09-03 | Stop reason: HOSPADM

## 2022-09-03 RX ORDER — WARFARIN SODIUM 2 MG/1
TABLET ORAL
Qty: 14 TABLET | Refills: 0 | Status: ON HOLD | OUTPATIENT
Start: 2022-09-03 | End: 2022-10-24 | Stop reason: SDUPTHER

## 2022-09-03 RX ORDER — AMOXICILLIN AND CLAVULANATE POTASSIUM 250; 125 MG/1; MG/1
1 TABLET, FILM COATED ORAL EVERY 12 HOURS SCHEDULED
Qty: 14 TABLET | Refills: 0 | Status: SHIPPED | OUTPATIENT
Start: 2022-09-03 | End: 2022-09-03

## 2022-09-03 RX ORDER — TORSEMIDE 20 MG/1
20 TABLET ORAL DAILY
Status: DISCONTINUED | OUTPATIENT
Start: 2022-09-03 | End: 2022-09-03 | Stop reason: HOSPADM

## 2022-09-03 RX ADMIN — POLYSACCHARIDE-IRON COMPLEX 150 MG: 150 CAPSULE ORAL at 09:09

## 2022-09-03 RX ADMIN — POTASSIUM CHLORIDE 20 MEQ: 1500 TABLET, EXTENDED RELEASE ORAL at 09:09

## 2022-09-03 RX ADMIN — SOTALOL HYDROCHLORIDE 80 MG: 80 TABLET ORAL at 09:09

## 2022-09-03 RX ADMIN — PIPERACILLIN AND TAZOBACTAM 3.38 G: 36; 4.5 INJECTION, POWDER, FOR SOLUTION INTRAVENOUS at 06:47

## 2022-09-03 RX ADMIN — TORSEMIDE 20 MG: 20 TABLET ORAL at 10:47

## 2022-09-03 RX ADMIN — PIPERACILLIN AND TAZOBACTAM 3.38 G: 36; 4.5 INJECTION, POWDER, FOR SOLUTION INTRAVENOUS at 01:08

## 2022-09-03 RX ADMIN — GABAPENTIN 100 MG: 100 CAPSULE ORAL at 09:09

## 2022-09-03 RX ADMIN — Medication 3 MG: at 01:23

## 2022-09-03 RX ADMIN — PANTOPRAZOLE SODIUM 40 MG: 40 TABLET, DELAYED RELEASE ORAL at 06:43

## 2022-09-03 RX ADMIN — HEPARIN SODIUM 5000 UNITS: 5000 INJECTION INTRAVENOUS; SUBCUTANEOUS at 09:09

## 2022-09-03 NOTE — ASSESSMENT & PLAN NOTE
· P/w severe chest and epigastric pain  · Imaging shows gallstones but no pericholecystic inflammation on CT however RUQ US read as "equivocal for cholecystitis", CBD 6mm, no evidence of CBD stone  · HIDA scan showed nonvisualization of gallbladder, consistent with acute cholecystitis  · hyperbilirubinemia likely d/t fasting state vs  Cholecystitis  · Blood cultures negative at 72 hours  · Leukocytosis resolved  · Repeat 9/1 INR, 1 86 after 2 FFP u's  · Percutaneous cholecystostomy tube placed 9/1 - fluid culture growing g positive cocci is likely a contaminant from skin    Plan:  · Augmentin for 1 week  · Follow Blood cultures   · Home VNA for drain care  · Percutaneous cholecystostomy flushing b i d   · Follow-up with surgery in 2 weeks

## 2022-09-03 NOTE — PROGRESS NOTES
Progress Note - General Surgery   Gale Breaux 81 y o  male MRN: [id number]  Unit/Bed#: S -01 Encounter: [id number]    Assessment:  82 yo M with acute cholecystitis s/p IR percutaneous cholecystostomy tube placement w/turbid, dark brown, bilious material on 9/1    afebrile  UOP -/200, 1x  Perc jeny 100cc bilious  tbil 3 31 from 4 47     Plan:  - Monitor perc jeny tube output and character  - Strict I/Os  - Currently on IV Zosyn   Recommend 1 week course abx post perc jeny drainage placement   Follow up outpatient surgery office in 2 weeks post discharge  - Diet as tolerated  - Pain and nausea control PRN  - OOB ambulate  - Rest of care per primary team  - Surgery team to sign off, please reach out for any questions or concerns        Subjective/Objective     Subjective:   Patient states his pain is overall improved  OOB and ambulating  Denies nausea, emesis, fevers, chills  +BMs and flatus    Objective:     Blood pressure 138/76, pulse 73, temperature 98 8 °F (37 1 °C), resp  rate 17, height 6' 1" (1 854 m), weight (!) 144 kg (317 lb 10 9 oz), SpO2 97 %  ,Body mass index is 41 91 kg/m²        Intake/Output Summary (Last 24 hours) at 9/3/2022 3086  Last data filed at 9/3/2022 0301  Gross per 24 hour   Intake 360 ml   Output 300 ml   Net 60 ml       Invasive Devices  Report    Peripheral Intravenous Line  Duration           Peripheral IV 08/29/22 Right Arm 4 days          Drain  Duration           Cholecystostomy Tube RUQ 1 day              Physical Exam:  General - no acute distress, responsive and cooperative  CV - warm, regular rate  Pulm - normal work of breathing, no respiratory distress  Abd - soft, protuberant, tender RUQ, perc jeny drain w/bilious output  Neuro - m/s grossly intact, cn grossly intact  Ext - moving all extremities        Scheduled Meds:  Current Facility-Administered Medications   Medication Dose Route Frequency Provider Last Rate    acetaminophen  650 mg Oral Q6H PRN Clari P Monica Maloney PA-C      atorvastatin  40 mg Oral After [employer], ALLISON      gabapentin  100 mg Oral Daily Nicky Mcgraw PA-C      heparin (porcine)  5,000 Units Subcutaneous Q12H [address] Jacob Zuniga MD      HYDROcodone-acetaminophen  1 tablet Oral Q6H PRN Nicky Mcgraw PA-C      HYDROmorphone  0 5 mg Intravenous Q6H PRN Nicky Mcgraw PA-C      HYDROmorphone  0 2 mg Intravenous Q4H PRN Nicky Mcgraw PA-C      insulin lispro  2-12 Units Subcutaneous 4x Daily (AC & HS) Jacob Zuniga MD      iron polysaccharides  150 mg Oral Daily Nicky Mcgraw PA-C      melatonin  3 mg Oral HS Lizzie Cueva MD      pantoprazole  40 mg Oral Early Morning Nicky Mcgraw PA-C      piperacillin-tazobactam  3 375 g Intravenous Q6H Jacob Zuniga MD 3 375 g (09/03/22 0647)    polyethylene glycol  17 g Oral Daily Jacob Zuniga MD      potassium chloride  20 mEq Oral BID Nicky Mcgraw PA-C      sotalol  80 mg Oral Q12H Clari Bansal PA-C      warfarin  2 mg Oral Daily (warfarin) Roxanna Matmaoros MD       Continuous Infusions:   PRN Meds:   acetaminophen    HYDROcodone-acetaminophen    HYDROmorphone    HYDROmorphone      Lab, Imaging and other studies:  I have personally reviewed pertinent lab results    , CBC:   Lab Results   Component Value Date    WBC 7 03 09/03/2022    HGB 9 8 (L) 09/03/2022    HCT 32 4 (L) 09/03/2022    MCV 92 09/03/2022     09/03/2022    MCH 27 7 09/03/2022    MCHC 30 2 (L) 09/03/2022    RDW 16 8 (H) 09/03/2022    MPV 10 4 09/03/2022   , CMP:   Lab Results   Component Value Date    SODIUM 137 09/03/2022    K 3 7 09/03/2022     09/03/2022    CO2 20 (L) 09/03/2022    BUN 36 (H) 09/03/2022    CREATININE 1 76 (H) 09/03/2022    CALCIUM 8 7 09/03/2022    AST 40 (H) 09/03/2022    ALT 21 09/03/2022    ALKPHOS 73 09/03/2022    EGFR 34 09/03/2022   , Coagulation:   Lab Results   Component Value Date    INR 1 92 (H) 09/03/2022   , Urinalysis: No results found for: Wanda Roel, SPECGRAV, PHUR, LEUKOCYTESUR, NITRITE, PROTEINUA, GLUCOSEU, KETONESU, BILIRUBINUR, BLOODU  VTE Pharmacologic Prophylaxis: Reason for no pharmacologic prophylaxis supratherapeutic INR  VTE Mechanical Prophylaxis: sequential compression device      Fina Gramajo MD  9/3/2022 7:14 AM

## 2022-09-03 NOTE — CASE MANAGEMENT
Case Management Discharge Planning Note    Patient name Sonam Blackmon  Location S /S -20 MRN [id number]  : 1935 Date 9/3/2022       Current Admission Date: 2022  Current Admission Diagnosis:Acute cholecystitis   Patient Active Problem List    Diagnosis Date Noted    Acute cholecystitis 2022    Benign prostatic hyperplasia with lower urinary tract symptoms 2022    OBINNA (obstructive sleep apnea)     Chronic heart failure with preserved ejection fraction (HFpEF) ([facility] ) 2022    Stage 3a chronic kidney disease with elevated creatinine 2022    Secondary lymphedema 2018    Ventricular tachycardia ([facility] ) 2018    Chronic venous insufficiency 2018    Venous ulcer of ankle, right ([facility] ) 2018    Generalized edema 2018    PAF (paroxysmal atrial fibrillation) ([facility] ) 2018    S/P AVR 2018    Essential hypertension 2018    DVT (deep venous thrombosis) ([facility] ) 2018    Presence of implantable cardioverter-defibrillator (ICD) 2018    Morbid obesity ([facility] ) 2017    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)     Hydrocele 2017    Chronic anticoagulation 2017    Anemia 2017    Epididymitis 2017    Cellulitis of scrotum 2017    Facial cellulitis 2016    Dental abscess 2016      LOS (days): 4  Geometric Mean LOS (GMLOS) (days): 3 00  Days to GMLOS:-0 9     OBJECTIVE:  Risk of Unplanned Readmission Score: 21 71         Current admission status: Inpatient   Preferred Pharmacy:   [facility] [address] [address]  Phone: [phone] Fax: [phone]    Primary Care Provider: Margaret Arias DO    Primary Insurance: MEDICARE  Secondary Insurance: [facility] HEALTH OPTIONS PROGRAM    DISCHARGE DETAILS:    Discharge planning discussed with[de-identified] Patient and spouse Kenyatta Roman  Freedom of Choice: Yes  Comments - Freedom of Choice: Reviewed with patient and wife accepting [facility] agencies for them to pick from  CM contacted family/caregiver?: Yes  Were Treatment Team discharge recommendations reviewed with patient/caregiver?: Yes  Did patient/caregiver verbalize understanding of patient care needs?: Yes  Were patient/caregiver advised of the risks associated with not following Treatment Team discharge recommendations?: Yes    Contacts  Patient Contacts: Patient and spouse-Paloma  Relationship to Patient[de-identified] Family  Contact Method: In Person  Reason/Outcome: Discharge Planning, Referral    Requested [address] [facility]         Is the patient interested in [facility] at discharge?: Yes  [address] requested[de-identified] Occupational Therapy, Nursing, Physical [address] Name[de-identified] [address] [facility] Provider[de-identified] PCP  Home Health Services Needed[de-identified] Evaluate Functional Status and Safety, Gait/ADL Training, Strengthening/Theraputic Exercises to Improve Function, Wound/Ostomy Care  Homebound Criteria Met[de-identified] Requires the Assistance of Another Person for Safe Ambulation or to Leave the Home, Uses an Assist Device (i e  cane, walker, etc)  Supporting Clincal Findings[de-identified] Fatigues Easliy in [employer], Limited Endurance    DME Referral Provided  Referral made for DME?: No    Other Referral/Resources/Interventions Provided:  Interventions: [facility]  Referral Comments: CM met with patient and spouse at bedside and reviewed available accepting [facility] agencies  Patient requested to move forward with [facility]  CM reserved them as the selected provider and updated SLIM that patient was all set on CM end for DC           Treatment Team Recommendation: Short Term Rehab  Discharge Destination Plan[de-identified] Home with [place] at Discharge : Family                             IMM Given (Date):: 09/03/22  IMM Given to[de-identified] Patient                       CM encouraged patient to follow up with all recommended appointments after discharge  Patient advised of importance for patient and family to participate in managing patient's medical well being

## 2022-09-03 NOTE — PLAN OF CARE
Problem: Potential for Falls  Goal: Patient will remain free of falls  Description: INTERVENTIONS:  - Educate patient/family on patient safety including physical limitations  - Instruct patient to call for assistance with activity   - Consult OT/PT to assist with strengthening/mobility   - Keep Call bell within reach  - Keep bed low and locked with side rails adjusted as appropriate  - Keep care items and personal belongings within reach  - Initiate and maintain comfort rounds  - Make Fall Risk Sign visible to staff  - Offer Toileting every 2 Hours, in advance of need  - Initiate/Maintain bed alarm  - Obtain necessary fall risk management equipment: Alarms  - Apply yellow socks and bracelet for high fall risk patients  - Consider moving patient to room near nurses station  Outcome: Progressing     Problem: PAIN - ADULT  Goal: Verbalizes/displays adequate comfort level or baseline comfort level  Description: Interventions:  - Encourage patient to monitor pain and request assistance  - Assess pain using appropriate pain scale  - Administer analgesics based on type and severity of pain and evaluate response  - Implement non-pharmacological measures as appropriate and evaluate response  - Consider cultural and social influences on pain and pain management  - Notify physician/advanced practitioner if interventions unsuccessful or patient reports new pain  Outcome: Progressing     Problem: INFECTION - ADULT  Goal: Absence or prevention of progression during hospitalization  Description: INTERVENTIONS:  - Assess and monitor for signs and symptoms of infection  - Monitor lab/diagnostic results  - Monitor all insertion sites, i e  indwelling lines, tubes, and drains  - Monitor endotracheal if appropriate and nasal secretions for changes in amount and color  - [place] appropriate cooling/warming therapies per order  - Administer medications as ordered  - Instruct and encourage patient and family to use good hand hygiene technique  - Identify and instruct in appropriate isolation precautions for identified infection/condition  Outcome: Progressing     Problem: DISCHARGE PLANNING  Goal: Discharge to home or other facility with appropriate resources  Description: INTERVENTIONS:  - Identify barriers to discharge w/patient and caregiver  - Arrange for needed discharge resources and transportation as appropriate  - Identify discharge learning needs (meds, wound care, etc )  - Arrange for interpretive services to assist at discharge as needed  - Refer to Case Management Department for coordinating discharge planning if the patient needs post-hospital services based on physician/advanced practitioner order or complex needs related to functional status, cognitive ability, or social support system  Outcome: Progressing     Problem: Knowledge Deficit  Goal: Patient/family/caregiver demonstrates understanding of disease process, treatment plan, medications, and discharge instructions  Description: Complete learning assessment and assess knowledge base  Interventions:  - Provide teaching at level of understanding  - Provide teaching via preferred learning methods  Outcome: Progressing     Problem: Nutrition/Hydration-ADULT  Goal: Nutrient/Hydration intake appropriate for improving, restoring or maintaining nutritional needs  Description: Monitor and assess patient's nutrition/hydration status for malnutrition  Collaborate with interdisciplinary team and initiate plan and interventions as ordered  Monitor patient's weight and dietary intake as ordered or per policy  Utilize nutrition screening tool and intervene as necessary  Determine patient's food preferences and provide high-protein, high-caloric foods as appropriate       INTERVENTIONS:  - Monitor oral intake, urinary output, labs, and treatment plans  - Assess nutrition and hydration status and recommend course of action  - Evaluate amount of meals eaten  - Assist patient with eating if necessary   - Allow adequate time for meals  - Recommend/ encourage appropriate diets, oral nutritional supplements, and vitamin/mineral supplements  - Order, calculate, and assess calorie counts as needed  - Recommend, monitor, and adjust tube feedings and TPN/PPN based on assessed needs  - Assess need for intravenous fluids  - Provide specific nutrition/hydration education as appropriate  - Include patient/family/caregiver in decisions related to nutrition  Outcome: Progressing

## 2022-09-03 NOTE — ASSESSMENT & PLAN NOTE
Wt Readings from Last 3 Encounters:   09/03/22 (!) 144 kg (317 lb 10 9 oz)   08/23/22 (!) 143 kg (314 lb 3 2 oz)   08/02/22 (!) 144 kg (317 lb)       Resume home torsemide

## 2022-09-03 NOTE — ASSESSMENT & PLAN NOTE
· Warfarin managed by PCP Dr Yaw Davila of   · Dosing is 6 mg on Tuesdays and Fridays, 4 mg daily on all other days of the week  · Vitamin K 2 5 mg - total of 2 doses  · FFP - total of 4 units  · 9/2 INR 1 97    Plan:  · Warfarin 2 mg once daily  · Check PT INR in 3 days  · Follow-up with primary care provider to adjust

## 2022-09-03 NOTE — PLAN OF CARE
Problem: Potential for Falls  Goal: Patient will remain free of falls  Description: INTERVENTIONS:  - Educate patient/family on patient safety including physical limitations  - Instruct patient to call for assistance with activity   - Consult OT/PT to assist with strengthening/mobility   - Keep Call bell within reach  - Keep bed low and locked with side rails adjusted as appropriate  - Keep care items and personal belongings within reach  - Initiate and maintain comfort rounds  - Make Fall Risk Sign visible to staff  - Offer Toileting every 2 Hours, in advance of need  - Initiate/Maintain bed alarm  - Obtain necessary fall risk management equipment: Alarms  - Apply yellow socks and bracelet for high fall risk patients  - Consider moving patient to room near nurses station  Outcome: Progressing

## 2022-09-04 LAB
BACTERIA BLD CULT: NORMAL
BACTERIA BLD CULT: NORMAL
BACTERIA SPEC ANAEROBE CULT: ABNORMAL
BACTERIA SPEC BFLD CULT: ABNORMAL
GRAM STN SPEC: ABNORMAL
GRAM STN SPEC: ABNORMAL

## 2022-09-05 ENCOUNTER — HOME CARE VISIT (OUTPATIENT)
Dept: HOME HEALTH SERVICES | Facility: HOME HEALTHCARE | Age: 87
End: 2022-09-05
Payer: MEDICARE

## 2022-09-05 VITALS
TEMPERATURE: 96.3 F | OXYGEN SATURATION: 97 % | SYSTOLIC BLOOD PRESSURE: 128 MMHG | HEART RATE: 82 BPM | DIASTOLIC BLOOD PRESSURE: 64 MMHG | RESPIRATION RATE: 16 BRPM

## 2022-09-05 PROCEDURE — 400013 VN SOC

## 2022-09-05 PROCEDURE — G0299 HHS/HOSPICE OF RN EA 15 MIN: HCPCS

## 2022-09-05 PROCEDURE — 10330081 VN NO-PAY CLAIM PROCEDURE

## 2022-09-05 NOTE — CASE COMMUNICATION
Back office please fax to PCP Marc Leon DO Fax: [phone]   [facility]'s [facility] has admitted your patient to [address] [place] [address] service with the following disciplines:      SN, PT and OT    Response needed, please respond via [employer] or tiger connect or call Colman Mcburney RN [facility] [phone]    Primary focus of home health care: GI  Patient stated goals of care:Get weight down and keep family doctor frequent check ups  Anticipated visit pa ttern: 2w3 and next visit date: 9/6/22 Return demo IR tube flushing and care  See medication list - meds in home differ from AVS: Potassium 20 meq BID and daily on AVS but patients bottle says daily  Patient taking miralax PRN only for consitpation  Patient reports using albuterol twice per week but no frequency listed in EPIC  Patient may benifit from a maitanance inhaler  TC to PCP left message with Chan Hua and tiger text to Dr Yovany jeffers to clarify potassium  Thank you for allowing us to participate in the care of your patient        Colman Mcburney RN

## 2022-09-06 ENCOUNTER — HOME CARE VISIT (OUTPATIENT)
Dept: HOME HEALTH SERVICES | Facility: HOME HEALTHCARE | Age: 87
End: 2022-09-06
Payer: MEDICARE

## 2022-09-06 VITALS
SYSTOLIC BLOOD PRESSURE: 140 MMHG | HEART RATE: 89 BPM | RESPIRATION RATE: 18 BRPM | TEMPERATURE: 97.7 F | OXYGEN SATURATION: 97 % | DIASTOLIC BLOOD PRESSURE: 72 MMHG

## 2022-09-06 PROCEDURE — 10330064 SPONGE, EXCILON SPLIT DRN STR 4"X4" (2/P

## 2022-09-06 PROCEDURE — 10330064

## 2022-09-06 PROCEDURE — G0299 HHS/HOSPICE OF RN EA 15 MIN: HCPCS

## 2022-09-06 NOTE — CASE COMMUNICATION
Ship to [facility]    Wound 1 Gail IR tube       Partial x    Drain sponges 4x4 split [id number] _[id number]  Transpore white  2in [id number] _1

## 2022-09-06 NOTE — CASE COMMUNICATION
Ship to _ Pt   Home    Insurance [facility]    Wound 1 Gail IR tube       Partial x    Drain sponges 4x4 split [id number] _7

## 2022-09-07 LAB
ATRIAL RATE: 86 BPM
QRS AXIS: 105 DEGREES
QRSD INTERVAL: 154 MS
QT INTERVAL: 444 MS
QTC INTERVAL: 506 MS
T WAVE AXIS: 76 DEGREES
VENTRICULAR RATE: 78 BPM

## 2022-09-07 PROCEDURE — 93010 ELECTROCARDIOGRAM REPORT: CPT | Performed by: INTERNAL MEDICINE

## 2022-09-09 ENCOUNTER — HOME CARE VISIT (OUTPATIENT)
Dept: HOME HEALTH SERVICES | Facility: HOME HEALTHCARE | Age: 87
End: 2022-09-09
Payer: MEDICARE

## 2022-09-09 PROCEDURE — G0152 HHCP-SERV OF OT,EA 15 MIN: HCPCS

## 2022-09-09 NOTE — CASE COMMUNICATION
OT evaluation completed this date and no further skilled intervention planned  Patient is performing nearly all ADLs independently although he  has not resumed showering on MD orders  Spouse is assisting with flushing drain  No DME or other devices needed  OT discharging

## 2022-09-12 ENCOUNTER — HOME CARE VISIT (OUTPATIENT)
Dept: HOME HEALTH SERVICES | Facility: HOME HEALTHCARE | Age: 87
End: 2022-09-12
Payer: MEDICARE

## 2022-09-12 VITALS — OXYGEN SATURATION: 97 % | DIASTOLIC BLOOD PRESSURE: 68 MMHG | SYSTOLIC BLOOD PRESSURE: 130 MMHG | HEART RATE: 78 BPM

## 2022-09-12 VITALS
DIASTOLIC BLOOD PRESSURE: 64 MMHG | RESPIRATION RATE: 20 BRPM | OXYGEN SATURATION: 98 % | HEART RATE: 77 BPM | SYSTOLIC BLOOD PRESSURE: 144 MMHG

## 2022-09-12 PROCEDURE — G0151 HHCP-SERV OF PT,EA 15 MIN: HCPCS

## 2022-09-12 PROCEDURE — G0180 MD CERTIFICATION HHA PATIENT: HCPCS | Performed by: INTERNAL MEDICINE

## 2022-09-13 ENCOUNTER — HOME CARE VISIT (OUTPATIENT)
Dept: HOME HEALTH SERVICES | Facility: HOME HEALTHCARE | Age: 87
End: 2022-09-13
Payer: MEDICARE

## 2022-09-13 VITALS
TEMPERATURE: 97.2 F | SYSTOLIC BLOOD PRESSURE: 122 MMHG | HEART RATE: 72 BPM | DIASTOLIC BLOOD PRESSURE: 68 MMHG | OXYGEN SATURATION: 96 % | RESPIRATION RATE: 18 BRPM

## 2022-09-13 PROCEDURE — G0299 HHS/HOSPICE OF RN EA 15 MIN: HCPCS

## 2022-09-13 NOTE — CASE COMMUNICATION
Home PT eval completed   Pts PCP Dr Chari Guerrero called for PT orders   Plan 2w2      teach home endurance program   Teach Energy conservation strategies    Teach bilat leg strengthening exs    DC to home program

## 2022-09-14 ENCOUNTER — HOME CARE VISIT (OUTPATIENT)
Dept: HOME HEALTH SERVICES | Facility: HOME HEALTHCARE | Age: 87
End: 2022-09-14
Payer: MEDICARE

## 2022-09-14 VITALS
RESPIRATION RATE: 20 BRPM | SYSTOLIC BLOOD PRESSURE: 112 MMHG | DIASTOLIC BLOOD PRESSURE: 68 MMHG | OXYGEN SATURATION: 97 % | HEART RATE: 78 BPM

## 2022-09-14 PROCEDURE — G0151 HHCP-SERV OF PT,EA 15 MIN: HCPCS

## 2022-09-15 ENCOUNTER — HOME CARE VISIT (OUTPATIENT)
Dept: HOME HEALTH SERVICES | Facility: HOME HEALTHCARE | Age: 87
End: 2022-09-15
Payer: MEDICARE

## 2022-09-15 VITALS
SYSTOLIC BLOOD PRESSURE: 122 MMHG | TEMPERATURE: 97.9 F | DIASTOLIC BLOOD PRESSURE: 48 MMHG | RESPIRATION RATE: 18 BRPM | OXYGEN SATURATION: 96 % | HEART RATE: 62 BPM

## 2022-09-15 PROCEDURE — G0299 HHS/HOSPICE OF RN EA 15 MIN: HCPCS

## 2022-09-20 ENCOUNTER — HOME CARE VISIT (OUTPATIENT)
Dept: HOME HEALTH SERVICES | Facility: HOME HEALTHCARE | Age: 87
End: 2022-09-20
Payer: MEDICARE

## 2022-09-20 VITALS
SYSTOLIC BLOOD PRESSURE: 118 MMHG | HEART RATE: 62 BPM | RESPIRATION RATE: 18 BRPM | OXYGEN SATURATION: 97 % | DIASTOLIC BLOOD PRESSURE: 64 MMHG | WEIGHT: 315 LBS | BODY MASS INDEX: 42.09 KG/M2

## 2022-09-20 VITALS
OXYGEN SATURATION: 95 % | HEART RATE: 62 BPM | TEMPERATURE: 97.9 F | DIASTOLIC BLOOD PRESSURE: 60 MMHG | RESPIRATION RATE: 18 BRPM | SYSTOLIC BLOOD PRESSURE: 116 MMHG

## 2022-09-20 PROCEDURE — G0151 HHCP-SERV OF PT,EA 15 MIN: HCPCS

## 2022-09-20 PROCEDURE — G0299 HHS/HOSPICE OF RN EA 15 MIN: HCPCS

## 2022-09-22 ENCOUNTER — HOME CARE VISIT (OUTPATIENT)
Dept: HOME HEALTH SERVICES | Facility: HOME HEALTHCARE | Age: 87
End: 2022-09-22
Payer: MEDICARE

## 2022-09-22 VITALS
RESPIRATION RATE: 18 BRPM | HEART RATE: 68 BPM | OXYGEN SATURATION: 97 % | SYSTOLIC BLOOD PRESSURE: 100 MMHG | DIASTOLIC BLOOD PRESSURE: 56 MMHG

## 2022-09-22 PROCEDURE — G0151 HHCP-SERV OF PT,EA 15 MIN: HCPCS

## 2022-09-26 ENCOUNTER — OFFICE VISIT (OUTPATIENT)
Dept: SURGERY | Facility: CLINIC | Age: 87
End: 2022-09-26
Payer: MEDICARE

## 2022-09-26 VITALS — BODY MASS INDEX: 40.69 KG/M2 | HEIGHT: 73 IN | WEIGHT: 307 LBS

## 2022-09-26 DIAGNOSIS — K81.0 ACUTE CHOLECYSTITIS: Primary | ICD-10-CM

## 2022-09-26 PROCEDURE — 99214 OFFICE O/P EST MOD 30 MIN: CPT | Performed by: SURGERY

## 2022-09-26 NOTE — PROGRESS NOTES
Assessment/Plan:  Patient was hospitalized recently with acute calculous cholecystitis  Given his delay in diagnosis and comorbidities, a percutaneous cholecystostomy tube was placed  His symptoms abated  He returns today for follow-up visit  Overall he feels well  He seems to be back to his baseline  Denies any fevers or chills  He is due for a biliary tube check in 2 weeks  If the cystic duct is patent, he will be able to remove the biliary drainage bag  This may make him more comfortable  Examination reveals his abdomen is soft  Cholecystostomy tube in place  I recommended an interval of time to allow the adhesions and scar tissue from the acute inflammation to self  Recommended a office visit his cardiologist in 3 months to clear him in terms of the history of aortic valvular replacement, atrial fibrillation resolved to normal sinus rhythm, history of V-tach with AICD implantation as well as diastolic heart failure  A follow-up visit in 4 months to schedule interval laparoscopic cholecystectomy is planned  He is agreeable  All questions answered  There are no diagnoses linked to this encounter  Subjective:      Patient ID: Vu Stoner is a 81 y o  male  Patient presents for hospital follow up  Hospitalized 8/29 to 9/3 for acute cholecystitis  He has a cholecystostomy tube        The following portions of the patient's history were reviewed and updated as appropriate:     He  has a past medical history of Anemia, Arthritis, Atrial fibrillation ([address] ), Basal cell carcinoma (03/22/2022), CHF (congestive heart failure) ([address] ), Diabetes mellitus ([address] ), DVT (deep vein thrombosis) in pregnancy (1966), DVT (deep venous thrombosis) ([address] ) (1966), Dyslipidemia, GERD (gastroesophageal reflux disease), Hyperlipidemia, Hypertension, Irregular heart beat, Morbid obesity due to excess calories ([address] ), Pulmonary embolism ([address] ), Sepsis ([address] ), Squamous cell skin cancer (07/30/2020), and Visual impairment  He  has a past surgical history that includes Vascular surgery; Aortic valve replacement; Total knee arthroplasty (Left); Cardiac surgery (02/2014); Insert / replace / remove pacemaker; Cardiac defibrillator placement (04/2014); Joint replacement (Left); Ivel tooth extraction; Colonoscopy; pr ligate/strip long saph vein belw sep-fem junc (Right, 8/17/2018); Vena cava filter placement; Replacement total knee (Right); Mohs surgery (07/30/2020); Mohs surgery (04/18/2022); and IR cholecystostomy tube placement (9/1/2022)  His family history includes Arthritis in his daughter, father, and mother; Cancer in his father; Stroke in his mother  He  reports that he has never smoked  He has never used smokeless tobacco  He reports previous alcohol use  He reports that he does not use drugs    Current Outpatient Medications   Medication Sig Dispense Refill    acetaminophen (TYLENOL) 325 mg tablet Take 2 tablets by mouth every 6 (six) hours as needed for mild pain or fever 30 tablet 0    albuterol (PROVENTIL HFA,VENTOLIN HFA) 90 mcg/act inhaler       atorvastatin (LIPITOR) 40 mg tablet Take 40 mg by mouth daily after dinner Atorvastatin Calcium 40 MG Oral Tablet Take 1 tablet daily  Refills: 0  Active       B Complex-C (SUPER B COMPLEX PO) Take 1 capsule by mouth daily       colchicine (COLCRYS) 0 6 mg tablet Take 0 6 mg by mouth as needed for muscle/joint pain      fluticasone (FLONASE) 50 mcg/act nasal spray 2 sprays as needed Shake liquid and spray       gabapentin (NEURONTIN) 100 mg capsule Take 100 mg by mouth daily      HYDROcodone-acetaminophen (NORCO) 5-325 mg per tablet Take 1 tablet by mouth every 6 (six) hours as needed      Iron Combinations (NIFEREX) TABS Take 1 tablet by mouth in the morning  5    metFORMIN (GLUCOPHAGE) 1000 MG tablet Take 1,000 mg by mouth daily after dinner MetFORMIN HCl 1000 MG (MOD) TB24 Take one tablet daily  Refills: 0  Active       Multiple Vitamin (MULTIVITAMINS PO) Take 1 tablet by mouth daily      mupirocin (BACTROBAN) 2 % ointment as needed       omeprazole (PriLOSEC) 20 mg delayed release capsule Omeprazole 20 MG Oral Tablet Delayed Release Take 1 tablet daily  Refills: 0  Active      polyethylene glycol (GLYCOLAX) 17 GM/SCOOP powder Take 17 g by mouth 2 (two) times a day      potassium chloride (K-DUR,KLOR-CON) 20 mEq tablet Take 20 mEq by mouth daily       sodium chloride, PF, 0 9 % 10 mL by Intracatheter route daily Intracatheter flushing daily 300 mL 3    sotalol (BETAPACE) 80 mg tablet Take 80 mg by mouth every 12 (twelve) hours   2    torsemide (DEMADEX) 20 mg tablet TAKE 3 TABLETS(60 MG TOTAL) BY MOUTH EVERY DAY  CAN TAKE 40 MG IN THE AM AND 20 MG IN THE AFTERNOON 270 tablet 3    warfarin (COUMADIN) 2 mg tablet Acknowledge (Patient taking differently: Acknowledge patient has 4 mg and 6 mg in home  Indications: Atrial Fibrillation, and DVT) 14 tablet 0     No current facility-administered medications for this visit  He is allergic to tramadol       Review of Systems   Constitutional: Negative  Negative for activity change  HENT: Negative  Eyes: Negative  Respiratory: Negative  Cardiovascular: Negative  Gastrointestinal: Negative  Endocrine: Negative  Genitourinary: Negative  Musculoskeletal: Positive for gait problem  Skin: Negative  Allergic/Immunologic: Negative  Psychiatric/Behavioral: Negative for agitation, behavioral problems and confusion  The patient is not nervous/anxious  Objective:      Ht 6' 1" (1 854 m)   Wt (!) 139 kg (307 lb)   BMI 40 50 kg/m²          Physical Exam  Constitutional:       Appearance: He is well-developed  He is not diaphoretic  HENT:      Head: Normocephalic and atraumatic  Eyes:      General: No scleral icterus  Right eye: No discharge  Left eye: No discharge  Extraocular Movements: Extraocular movements intact        Conjunctiva/sclera: Conjunctivae normal    Neck:      Thyroid: No thyromegaly  Trachea: No tracheal deviation  Cardiovascular:      Rate and Rhythm: Normal rate and regular rhythm  Heart sounds: Normal heart sounds  No murmur heard  No friction rub  Pulmonary:      Effort: Pulmonary effort is normal  No respiratory distress  Breath sounds: Normal breath sounds  No stridor  No wheezing  Chest:      Chest wall: No tenderness  Abdominal:      General: There is no distension  Palpations: Abdomen is soft  There is no mass  Tenderness: There is no abdominal tenderness  There is no guarding or rebound  Musculoskeletal:         General: No tenderness  Cervical back: Normal range of motion and neck supple  Right lower leg: Edema present  Left lower leg: Edema present  Lymphadenopathy:      Cervical: No cervical adenopathy  Skin:     General: Skin is warm and dry  Findings: No erythema or rash  Neurological:      Mental Status: He is alert and oriented to person, place, and time  Cranial Nerves: No cranial nerve deficit        Coordination: Coordination normal    Psychiatric:         Behavior: Behavior normal          Judgment: Judgment normal

## 2022-09-27 ENCOUNTER — HOME CARE VISIT (OUTPATIENT)
Dept: HOME HEALTH SERVICES | Facility: HOME HEALTHCARE | Age: 87
End: 2022-09-27
Payer: MEDICARE

## 2022-09-27 PROCEDURE — G0151 HHCP-SERV OF PT,EA 15 MIN: HCPCS

## 2022-09-28 VITALS — SYSTOLIC BLOOD PRESSURE: 122 MMHG | HEART RATE: 70 BPM | DIASTOLIC BLOOD PRESSURE: 60 MMHG | RESPIRATION RATE: 18 BRPM

## 2022-09-29 ENCOUNTER — HOME CARE VISIT (OUTPATIENT)
Dept: HOME HEALTH SERVICES | Facility: HOME HEALTHCARE | Age: 87
End: 2022-09-29
Payer: MEDICARE

## 2022-09-29 VITALS — HEART RATE: 64 BPM | DIASTOLIC BLOOD PRESSURE: 58 MMHG | SYSTOLIC BLOOD PRESSURE: 122 MMHG | RESPIRATION RATE: 20 BRPM

## 2022-09-29 PROCEDURE — G0151 HHCP-SERV OF PT,EA 15 MIN: HCPCS

## 2022-10-11 ENCOUNTER — TELEPHONE (OUTPATIENT)
Dept: SLEEP CENTER | Facility: CLINIC | Age: 87
End: 2022-10-11

## 2022-10-11 NOTE — TELEPHONE ENCOUNTER
Patient left message on nurse line, unsure if he should cancel CPAP titration study  Patient recently had a gallbladder infection and surgery  Patient is in a great deal of pain and has been unable to sleep in any position  Patient also having difficulty with ambulation  Patient was re-scheduled to 3/30/2023 in [place]  Patient was added to cancellation list for sooner study date  Staff made aware of cancellation

## 2022-10-13 ENCOUNTER — HOSPITAL ENCOUNTER (OUTPATIENT)
Dept: RADIOLOGY | Facility: HOSPITAL | Age: 87
Discharge: HOME | End: 2022-10-13
Attending: RADIOLOGY
Payer: MEDICARE

## 2022-10-13 DIAGNOSIS — K81.0 ACUTE CHOLECYSTITIS: Primary | ICD-10-CM

## 2022-10-13 PROCEDURE — 47531 INJECTION FOR CHOLANGIOGRAM: CPT

## 2022-10-13 RX ORDER — SODIUM CHLORIDE 9 MG/ML
10 INJECTION INTRAVENOUS DAILY
Qty: 1200 ML | Refills: 0 | Status: SHIPPED | OUTPATIENT
Start: 2022-10-13 | End: 2022-10-24

## 2022-10-13 RX ADMIN — IOHEXOL 10 ML: 350 INJECTION, SOLUTION INTRAVENOUS at 09:56

## 2022-10-13 NOTE — SEDATION DOCUMENTATION
Tube check, cystic duct not visualized  Reconnected to bag  Patient reports flushing daily with 10 mL of saline

## 2022-10-18 ENCOUNTER — HOSPITAL ENCOUNTER (INPATIENT)
Facility: HOSPITAL | Age: 87
LOS: 6 days | Discharge: HOME HEALTH | DRG: 444 | End: 2022-10-24
Attending: EMERGENCY MEDICINE | Admitting: SURGERY
Payer: MEDICARE

## 2022-10-18 ENCOUNTER — APPOINTMENT (EMERGENCY)
Dept: CT IMAGING | Facility: HOSPITAL | Age: 87
DRG: 444 | End: 2022-10-18
Payer: MEDICARE

## 2022-10-18 ENCOUNTER — APPOINTMENT (EMERGENCY)
Dept: RADIOLOGY | Facility: HOSPITAL | Age: 87
DRG: 444 | End: 2022-10-18
Payer: MEDICARE

## 2022-10-18 DIAGNOSIS — R79.1 SUPRATHERAPEUTIC INR: ICD-10-CM

## 2022-10-18 DIAGNOSIS — K81.0 ACUTE CHOLECYSTITIS: Primary | ICD-10-CM

## 2022-10-18 DIAGNOSIS — Z95.2 S/P AVR: ICD-10-CM

## 2022-10-18 DIAGNOSIS — I48.0 PAF (PAROXYSMAL ATRIAL FIBRILLATION) (HCC): ICD-10-CM

## 2022-10-18 DIAGNOSIS — N18.31 STAGE 3A CHRONIC KIDNEY DISEASE (HCC): ICD-10-CM

## 2022-10-18 DIAGNOSIS — E11.40 TYPE 2 DIABETES MELLITUS WITH DIABETIC NEUROPATHY, WITHOUT LONG-TERM CURRENT USE OF INSULIN (HCC): ICD-10-CM

## 2022-10-18 LAB
2HR DELTA HS TROPONIN: 1 NG/L
ALBUMIN SERPL BCP-MCNC: 3.6 G/DL (ref 3.5–5)
ALP SERPL-CCNC: 99 U/L (ref 34–104)
ALT SERPL W P-5'-P-CCNC: 61 U/L (ref 7–52)
ANION GAP SERPL CALCULATED.3IONS-SCNC: 11 MMOL/L (ref 4–13)
APTT PPP: 67 SECONDS (ref 23–37)
AST SERPL W P-5'-P-CCNC: 68 U/L (ref 13–39)
ATRIAL RATE: 51 BPM
BASOPHILS # BLD AUTO: 0.03 THOUSANDS/ÂΜL (ref 0–0.1)
BASOPHILS NFR BLD AUTO: 0 % (ref 0–1)
BILIRUB SERPL-MCNC: 1.58 MG/DL (ref 0.2–1)
BILIRUB UR QL STRIP: NEGATIVE
BUN SERPL-MCNC: 54 MG/DL (ref 5–25)
CALCIUM SERPL-MCNC: 9.7 MG/DL (ref 8.4–10.2)
CARDIAC TROPONIN I PNL SERPL HS: 10 NG/L
CARDIAC TROPONIN I PNL SERPL HS: 9 NG/L
CHLORIDE SERPL-SCNC: 98 MMOL/L (ref 96–108)
CLARITY UR: CLEAR
CO2 SERPL-SCNC: 28 MMOL/L (ref 21–32)
COLOR UR: NORMAL
CREAT SERPL-MCNC: 1.92 MG/DL (ref 0.6–1.3)
EOSINOPHIL # BLD AUTO: 0.16 THOUSAND/ÂΜL (ref 0–0.61)
EOSINOPHIL NFR BLD AUTO: 2 % (ref 0–6)
ERYTHROCYTE [DISTWIDTH] IN BLOOD BY AUTOMATED COUNT: 17.3 % (ref 11.6–15.1)
FLUAV RNA RESP QL NAA+PROBE: NEGATIVE
FLUBV RNA RESP QL NAA+PROBE: NEGATIVE
GFR SERPL CREATININE-BSD FRML MDRD: 30 ML/MIN/1.73SQ M
GLUCOSE SERPL-MCNC: 136 MG/DL (ref 65–140)
GLUCOSE UR STRIP-MCNC: NEGATIVE MG/DL
HCT VFR BLD AUTO: 31.5 % (ref 36.5–49.3)
HGB BLD-MCNC: 9.9 G/DL (ref 12–17)
HGB UR QL STRIP.AUTO: NEGATIVE
IMM GRANULOCYTES # BLD AUTO: 0.03 THOUSAND/UL (ref 0–0.2)
IMM GRANULOCYTES NFR BLD AUTO: 0 % (ref 0–2)
INR PPP: 3.95 (ref 0.84–1.19)
KETONES UR STRIP-MCNC: NEGATIVE MG/DL
LACTATE SERPL-SCNC: 1.2 MMOL/L (ref 0.5–2)
LEUKOCYTE ESTERASE UR QL STRIP: NEGATIVE
LIPASE SERPL-CCNC: 26 U/L (ref 11–82)
LYMPHOCYTES # BLD AUTO: 0.92 THOUSANDS/ÂΜL (ref 0.6–4.47)
LYMPHOCYTES NFR BLD AUTO: 12 % (ref 14–44)
MAGNESIUM SERPL-MCNC: 1.8 MG/DL (ref 1.9–2.7)
MCH RBC QN AUTO: 27 PG (ref 26.8–34.3)
MCHC RBC AUTO-ENTMCNC: 31.4 G/DL (ref 31.4–37.4)
MCV RBC AUTO: 86 FL (ref 82–98)
MONOCYTES # BLD AUTO: 0.77 THOUSAND/ÂΜL (ref 0.17–1.22)
MONOCYTES NFR BLD AUTO: 10 % (ref 4–12)
NEUTROPHILS # BLD AUTO: 6.03 THOUSANDS/ÂΜL (ref 1.85–7.62)
NEUTS SEG NFR BLD AUTO: 76 % (ref 43–75)
NITRITE UR QL STRIP: NEGATIVE
NRBC BLD AUTO-RTO: 0 /100 WBCS
PH UR STRIP.AUTO: 5 [PH]
PLATELET # BLD AUTO: 262 THOUSANDS/UL (ref 149–390)
PMV BLD AUTO: 9.9 FL (ref 8.9–12.7)
POTASSIUM SERPL-SCNC: 4.2 MMOL/L (ref 3.5–5.3)
PROCALCITONIN SERPL-MCNC: 0.1 NG/ML
PROT SERPL-MCNC: 7.7 G/DL (ref 6.4–8.4)
PROT UR STRIP-MCNC: NEGATIVE MG/DL
PROTHROMBIN TIME: 38.9 SECONDS (ref 11.6–14.5)
QRS AXIS: 126 DEGREES
QRSD INTERVAL: 158 MS
QT INTERVAL: 460 MS
QTC INTERVAL: 513 MS
RBC # BLD AUTO: 3.66 MILLION/UL (ref 3.88–5.62)
RSV RNA RESP QL NAA+PROBE: NEGATIVE
SARS-COV-2 RNA RESP QL NAA+PROBE: NEGATIVE
SODIUM SERPL-SCNC: 137 MMOL/L (ref 135–147)
SP GR UR STRIP.AUTO: 1.01 (ref 1–1.03)
T WAVE AXIS: 51 DEGREES
UROBILINOGEN UR STRIP-ACNC: <2 MG/DL
VENTRICULAR RATE: 75 BPM
WBC # BLD AUTO: 7.94 THOUSAND/UL (ref 4.31–10.16)

## 2022-10-18 PROCEDURE — 83605 ASSAY OF LACTIC ACID: CPT

## 2022-10-18 PROCEDURE — 36415 COLL VENOUS BLD VENIPUNCTURE: CPT

## 2022-10-18 PROCEDURE — NC001 PR NO CHARGE: Performed by: SURGERY

## 2022-10-18 PROCEDURE — 93010 ELECTROCARDIOGRAM REPORT: CPT | Performed by: INTERNAL MEDICINE

## 2022-10-18 PROCEDURE — 96361 HYDRATE IV INFUSION ADD-ON: CPT

## 2022-10-18 PROCEDURE — 85730 THROMBOPLASTIN TIME PARTIAL: CPT

## 2022-10-18 PROCEDURE — 99285 EMERGENCY DEPT VISIT HI MDM: CPT | Performed by: EMERGENCY MEDICINE

## 2022-10-18 PROCEDURE — 83690 ASSAY OF LIPASE: CPT

## 2022-10-18 PROCEDURE — 71045 X-RAY EXAM CHEST 1 VIEW: CPT

## 2022-10-18 PROCEDURE — G1004 CDSM NDSC: HCPCS

## 2022-10-18 PROCEDURE — 85610 PROTHROMBIN TIME: CPT

## 2022-10-18 PROCEDURE — 71275 CT ANGIOGRAPHY CHEST: CPT

## 2022-10-18 PROCEDURE — 85025 COMPLETE CBC W/AUTO DIFF WBC: CPT

## 2022-10-18 PROCEDURE — 87040 BLOOD CULTURE FOR BACTERIA: CPT

## 2022-10-18 PROCEDURE — 84145 PROCALCITONIN (PCT): CPT

## 2022-10-18 PROCEDURE — 96365 THER/PROPH/DIAG IV INF INIT: CPT

## 2022-10-18 PROCEDURE — 84484 ASSAY OF TROPONIN QUANT: CPT

## 2022-10-18 PROCEDURE — 81003 URINALYSIS AUTO W/O SCOPE: CPT

## 2022-10-18 PROCEDURE — 74177 CT ABD & PELVIS W/CONTRAST: CPT

## 2022-10-18 PROCEDURE — 99285 EMERGENCY DEPT VISIT HI MDM: CPT

## 2022-10-18 PROCEDURE — 93005 ELECTROCARDIOGRAM TRACING: CPT

## 2022-10-18 PROCEDURE — 83735 ASSAY OF MAGNESIUM: CPT

## 2022-10-18 PROCEDURE — 80053 COMPREHEN METABOLIC PANEL: CPT

## 2022-10-18 PROCEDURE — 87154 CUL TYP ID BLD PTHGN 6+ TRGT: CPT

## 2022-10-18 PROCEDURE — 0241U HB NFCT DS VIR RESP RNA 4 TRGT: CPT

## 2022-10-18 RX ORDER — ONDANSETRON 2 MG/ML
4 INJECTION INTRAMUSCULAR; INTRAVENOUS EVERY 6 HOURS PRN
Status: DISCONTINUED | OUTPATIENT
Start: 2022-10-18 | End: 2022-10-18

## 2022-10-18 RX ORDER — OXYCODONE HYDROCHLORIDE 5 MG/1
5 TABLET ORAL EVERY 4 HOURS PRN
Status: DISCONTINUED | OUTPATIENT
Start: 2022-10-18 | End: 2022-10-19

## 2022-10-18 RX ORDER — SODIUM CHLORIDE, SODIUM LACTATE, POTASSIUM CHLORIDE, CALCIUM CHLORIDE 600; 310; 30; 20 MG/100ML; MG/100ML; MG/100ML; MG/100ML
75 INJECTION, SOLUTION INTRAVENOUS CONTINUOUS
Status: DISCONTINUED | OUTPATIENT
Start: 2022-10-18 | End: 2022-10-20

## 2022-10-18 RX ORDER — ACETAMINOPHEN 325 MG/1
975 TABLET ORAL EVERY 8 HOURS SCHEDULED
Status: DISCONTINUED | OUTPATIENT
Start: 2022-10-18 | End: 2022-10-24 | Stop reason: HOSPADM

## 2022-10-18 RX ORDER — HYDROMORPHONE HCL IN WATER/PF 6 MG/30 ML
0.2 PATIENT CONTROLLED ANALGESIA SYRINGE INTRAVENOUS EVERY 4 HOURS PRN
Status: DISCONTINUED | OUTPATIENT
Start: 2022-10-18 | End: 2022-10-19

## 2022-10-18 RX ORDER — CEFAZOLIN SODIUM 2 G/50ML
2000 SOLUTION INTRAVENOUS EVERY 8 HOURS
Status: DISCONTINUED | OUTPATIENT
Start: 2022-10-18 | End: 2022-10-21

## 2022-10-18 RX ORDER — OXYCODONE HYDROCHLORIDE 5 MG/1
2.5 TABLET ORAL EVERY 4 HOURS PRN
Status: DISCONTINUED | OUTPATIENT
Start: 2022-10-18 | End: 2022-10-19

## 2022-10-18 RX ORDER — METRONIDAZOLE 500 MG/100ML
500 INJECTION, SOLUTION INTRAVENOUS EVERY 8 HOURS
Status: DISCONTINUED | OUTPATIENT
Start: 2022-10-18 | End: 2022-10-21

## 2022-10-18 RX ORDER — SCOLOPAMINE TRANSDERMAL SYSTEM 1 MG/1
1 PATCH, EXTENDED RELEASE TRANSDERMAL
Status: DISCONTINUED | OUTPATIENT
Start: 2022-10-18 | End: 2022-10-24 | Stop reason: HOSPADM

## 2022-10-18 RX ADMIN — SODIUM CHLORIDE 500 ML: 0.9 INJECTION, SOLUTION INTRAVENOUS at 20:19

## 2022-10-18 RX ADMIN — IOHEXOL 100 ML: 350 INJECTION, SOLUTION INTRAVENOUS at 20:05

## 2022-10-18 RX ADMIN — PIPERACILLIN AND TAZOBACTAM 4.5 G: 36; 4.5 INJECTION, POWDER, FOR SOLUTION INTRAVENOUS at 21:42

## 2022-10-18 NOTE — ED NOTES
Patient transported to [facility]  10/18/22 1954

## 2022-10-18 NOTE — ED NOTES
This RN attempted twice at IV access and was unsuccessful    Will request assistance from another RN     April Keith Denis, RN  10/18/22 [id number]

## 2022-10-18 NOTE — LETTER
[address] [address]  Dept: [phone]    October 24, 2022     Patient: Emmett Duggan   YOB: 1935   Date of Visit: 10/18/2022       To Whom it May Concern:    Maida Bustos is under my professional care  He was seen in the hospital from 10/18/2022   to 10/24/22  He may return to work on 10/25 with the following limitations no lifting greather than 20lbs  If you have any questions or concerns, please don't hesitate to call           Sincerely,          Yimi Morrow MD

## 2022-10-19 ENCOUNTER — APPOINTMENT (INPATIENT)
Dept: RADIOLOGY | Facility: HOSPITAL | Age: 87
DRG: 444 | End: 2022-10-19
Attending: RADIOLOGY
Payer: MEDICARE

## 2022-10-19 PROBLEM — G89.29 CHRONIC BILATERAL LOW BACK PAIN WITHOUT SCIATICA: Status: ACTIVE | Noted: 2017-04-24

## 2022-10-19 PROBLEM — N28.1 RENAL CYST: Status: ACTIVE | Noted: 2017-04-04

## 2022-10-19 PROBLEM — M79.89 SWELLING OF LIMB: Status: ACTIVE | Noted: 2021-02-04

## 2022-10-19 PROBLEM — R31.0 HEMATURIA, GROSS: Status: ACTIVE | Noted: 2017-05-19

## 2022-10-19 PROBLEM — M17.9 OSTEOARTHRITIS, KNEE: Status: ACTIVE | Noted: 2021-11-15

## 2022-10-19 PROBLEM — N17.9 AKI (ACUTE KIDNEY INJURY) (HCC): Status: ACTIVE | Noted: 2021-11-18

## 2022-10-19 PROBLEM — Z96.651 STATUS POST RIGHT KNEE REPLACEMENT: Status: ACTIVE | Noted: 2021-11-15

## 2022-10-19 PROBLEM — S32.040A COMPRESSION FRACTURE OF L4 LUMBAR VERTEBRA: Status: ACTIVE | Noted: 2017-04-24

## 2022-10-19 PROBLEM — M54.50 CHRONIC BILATERAL LOW BACK PAIN WITHOUT SCIATICA: Status: ACTIVE | Noted: 2017-04-24

## 2022-10-19 PROBLEM — E78.2 MIXED HYPERLIPIDEMIA: Status: ACTIVE | Noted: 2021-03-29

## 2022-10-19 PROBLEM — N40.0 BPH (BENIGN PROSTATIC HYPERPLASIA): Status: ACTIVE | Noted: 2017-04-20

## 2022-10-19 PROBLEM — E66.9 OBESITY, UNSPECIFIED OBESITY SEVERITY, UNSPECIFIED OBESITY TYPE: Status: ACTIVE | Noted: 2017-04-04

## 2022-10-19 PROBLEM — I82.509 CHRONIC DEEP VEIN THROMBOSIS (DVT) (HCC): Status: ACTIVE | Noted: 2020-09-10

## 2022-10-19 PROBLEM — E83.42 HYPOMAGNESEMIA: Status: ACTIVE | Noted: 2022-10-19

## 2022-10-19 PROBLEM — N39.0 URINARY TRACT INFECTION, ACUTE: Status: ACTIVE | Noted: 2017-04-04

## 2022-10-19 LAB
ALBUMIN SERPL BCP-MCNC: 3.3 G/DL (ref 3.5–5)
ALP SERPL-CCNC: 80 U/L (ref 34–104)
ALT SERPL W P-5'-P-CCNC: 49 U/L (ref 7–52)
ANION GAP SERPL CALCULATED.3IONS-SCNC: 10 MMOL/L (ref 4–13)
AST SERPL W P-5'-P-CCNC: 49 U/L (ref 13–39)
BASOPHILS # BLD AUTO: 0.03 THOUSANDS/ÂΜL (ref 0–0.1)
BASOPHILS NFR BLD AUTO: 0 % (ref 0–1)
BILIRUB SERPL-MCNC: 1.5 MG/DL (ref 0.2–1)
BUN SERPL-MCNC: 50 MG/DL (ref 5–25)
CALCIUM ALBUM COR SERPL-MCNC: 9.9 MG/DL (ref 8.3–10.1)
CALCIUM SERPL-MCNC: 9.3 MG/DL (ref 8.4–10.2)
CHLORIDE SERPL-SCNC: 103 MMOL/L (ref 96–108)
CO2 SERPL-SCNC: 27 MMOL/L (ref 21–32)
CREAT SERPL-MCNC: 1.93 MG/DL (ref 0.6–1.3)
EOSINOPHIL # BLD AUTO: 0.14 THOUSAND/ÂΜL (ref 0–0.61)
EOSINOPHIL NFR BLD AUTO: 2 % (ref 0–6)
ERYTHROCYTE [DISTWIDTH] IN BLOOD BY AUTOMATED COUNT: 17.4 % (ref 11.6–15.1)
GFR SERPL CREATININE-BSD FRML MDRD: 30 ML/MIN/1.73SQ M
GLUCOSE SERPL-MCNC: 108 MG/DL (ref 65–140)
GLUCOSE SERPL-MCNC: 113 MG/DL (ref 65–140)
GLUCOSE SERPL-MCNC: 126 MG/DL (ref 65–140)
GLUCOSE SERPL-MCNC: 131 MG/DL (ref 65–140)
GLUCOSE SERPL-MCNC: 137 MG/DL (ref 65–140)
GLUCOSE SERPL-MCNC: 152 MG/DL (ref 65–140)
GLUCOSE SERPL-MCNC: 164 MG/DL (ref 65–140)
HCT VFR BLD AUTO: 29.6 % (ref 36.5–49.3)
HGB BLD-MCNC: 9.2 G/DL (ref 12–17)
IMM GRANULOCYTES # BLD AUTO: 0.04 THOUSAND/UL (ref 0–0.2)
IMM GRANULOCYTES NFR BLD AUTO: 1 % (ref 0–2)
INR PPP: 4.36 (ref 0.84–1.19)
LYMPHOCYTES # BLD AUTO: 1.25 THOUSANDS/ÂΜL (ref 0.6–4.47)
LYMPHOCYTES NFR BLD AUTO: 16 % (ref 14–44)
MAGNESIUM SERPL-MCNC: 1.8 MG/DL (ref 1.9–2.7)
MCH RBC QN AUTO: 27.1 PG (ref 26.8–34.3)
MCHC RBC AUTO-ENTMCNC: 31.1 G/DL (ref 31.4–37.4)
MCV RBC AUTO: 87 FL (ref 82–98)
MONOCYTES # BLD AUTO: 0.74 THOUSAND/ÂΜL (ref 0.17–1.22)
MONOCYTES NFR BLD AUTO: 9 % (ref 4–12)
NEUTROPHILS # BLD AUTO: 5.65 THOUSANDS/ÂΜL (ref 1.85–7.62)
NEUTS SEG NFR BLD AUTO: 72 % (ref 43–75)
NRBC BLD AUTO-RTO: 0 /100 WBCS
PHOSPHATE SERPL-MCNC: 5 MG/DL (ref 2.3–4.1)
PLATELET # BLD AUTO: 251 THOUSANDS/UL (ref 149–390)
PMV BLD AUTO: 9.7 FL (ref 8.9–12.7)
POTASSIUM SERPL-SCNC: 3.7 MMOL/L (ref 3.5–5.3)
PROT SERPL-MCNC: 6.8 G/DL (ref 6.4–8.4)
PROTHROMBIN TIME: 41.9 SECONDS (ref 11.6–14.5)
RBC # BLD AUTO: 3.39 MILLION/UL (ref 3.88–5.62)
SODIUM SERPL-SCNC: 140 MMOL/L (ref 135–147)
WBC # BLD AUTO: 7.85 THOUSAND/UL (ref 4.31–10.16)

## 2022-10-19 PROCEDURE — 85025 COMPLETE CBC W/AUTO DIFF WBC: CPT | Performed by: STUDENT IN AN ORGANIZED HEALTH CARE EDUCATION/TRAINING PROGRAM

## 2022-10-19 PROCEDURE — 36415 COLL VENOUS BLD VENIPUNCTURE: CPT | Performed by: STUDENT IN AN ORGANIZED HEALTH CARE EDUCATION/TRAINING PROGRAM

## 2022-10-19 PROCEDURE — C1729 CATH, DRAINAGE: HCPCS

## 2022-10-19 PROCEDURE — 83735 ASSAY OF MAGNESIUM: CPT | Performed by: STUDENT IN AN ORGANIZED HEALTH CARE EDUCATION/TRAINING PROGRAM

## 2022-10-19 PROCEDURE — 99223 1ST HOSP IP/OBS HIGH 75: CPT | Performed by: INTERNAL MEDICINE

## 2022-10-19 PROCEDURE — 82948 REAGENT STRIP/BLOOD GLUCOSE: CPT

## 2022-10-19 PROCEDURE — 47536 EXCHANGE BILIARY DRG CATH: CPT | Performed by: RADIOLOGY

## 2022-10-19 PROCEDURE — 99232 SBSQ HOSP IP/OBS MODERATE 35: CPT | Performed by: SURGERY

## 2022-10-19 PROCEDURE — 84100 ASSAY OF PHOSPHORUS: CPT | Performed by: STUDENT IN AN ORGANIZED HEALTH CARE EDUCATION/TRAINING PROGRAM

## 2022-10-19 PROCEDURE — 80053 COMPREHEN METABOLIC PANEL: CPT | Performed by: STUDENT IN AN ORGANIZED HEALTH CARE EDUCATION/TRAINING PROGRAM

## 2022-10-19 PROCEDURE — 47531 INJECTION FOR CHOLANGIOGRAM: CPT

## 2022-10-19 PROCEDURE — NC001 PR NO CHARGE: Performed by: RADIOLOGY

## 2022-10-19 PROCEDURE — 85610 PROTHROMBIN TIME: CPT | Performed by: STUDENT IN AN ORGANIZED HEALTH CARE EDUCATION/TRAINING PROGRAM

## 2022-10-19 RX ORDER — HYDROMORPHONE HCL IN WATER/PF 6 MG/30 ML
0.2 PATIENT CONTROLLED ANALGESIA SYRINGE INTRAVENOUS
Status: DISCONTINUED | OUTPATIENT
Start: 2022-10-19 | End: 2022-10-20

## 2022-10-19 RX ORDER — ALBUTEROL SULFATE 90 UG/1
2 AEROSOL, METERED RESPIRATORY (INHALATION) EVERY 4 HOURS PRN
Status: DISCONTINUED | OUTPATIENT
Start: 2022-10-19 | End: 2022-10-24 | Stop reason: HOSPADM

## 2022-10-19 RX ORDER — LIDOCAINE WITH 8.4% SOD BICARB 0.9%(10ML)
SYRINGE (ML) INJECTION CODE/TRAUMA/SEDATION MEDICATION
Status: COMPLETED | OUTPATIENT
Start: 2022-10-19 | End: 2022-10-19

## 2022-10-19 RX ORDER — INSULIN LISPRO 100 [IU]/ML
4-20 INJECTION, SOLUTION INTRAVENOUS; SUBCUTANEOUS EVERY 6 HOURS SCHEDULED
Status: DISCONTINUED | OUTPATIENT
Start: 2022-10-19 | End: 2022-10-21

## 2022-10-19 RX ORDER — SOTALOL HYDROCHLORIDE 80 MG/1
80 TABLET ORAL DAILY
Status: DISCONTINUED | OUTPATIENT
Start: 2022-10-19 | End: 2022-10-24 | Stop reason: HOSPADM

## 2022-10-19 RX ORDER — ATORVASTATIN CALCIUM 40 MG/1
40 TABLET, FILM COATED ORAL
Status: DISCONTINUED | OUTPATIENT
Start: 2022-10-19 | End: 2022-10-24 | Stop reason: HOSPADM

## 2022-10-19 RX ORDER — PHYTONADIONE 5 MG/1
2.5 TABLET ORAL ONCE
Status: COMPLETED | OUTPATIENT
Start: 2022-10-19 | End: 2022-10-19

## 2022-10-19 RX ORDER — HYDROMORPHONE HCL/PF 1 MG/ML
0.5 SYRINGE (ML) INJECTION
Status: DISCONTINUED | OUTPATIENT
Start: 2022-10-19 | End: 2022-10-20

## 2022-10-19 RX ORDER — MAGNESIUM SULFATE HEPTAHYDRATE 40 MG/ML
2 INJECTION, SOLUTION INTRAVENOUS ONCE
Status: COMPLETED | OUTPATIENT
Start: 2022-10-19 | End: 2022-10-19

## 2022-10-19 RX ORDER — PANTOPRAZOLE SODIUM 40 MG/1
40 TABLET, DELAYED RELEASE ORAL
Status: DISCONTINUED | OUTPATIENT
Start: 2022-10-20 | End: 2022-10-24 | Stop reason: HOSPADM

## 2022-10-19 RX ADMIN — METRONIDAZOLE 500 MG: 500 INJECTION, SOLUTION INTRAVENOUS at 00:34

## 2022-10-19 RX ADMIN — METRONIDAZOLE 500 MG: 500 INJECTION, SOLUTION INTRAVENOUS at 07:36

## 2022-10-19 RX ADMIN — ACETAMINOPHEN 975 MG: 325 TABLET, FILM COATED ORAL at 22:17

## 2022-10-19 RX ADMIN — METRONIDAZOLE 500 MG: 500 INJECTION, SOLUTION INTRAVENOUS at 22:51

## 2022-10-19 RX ADMIN — SODIUM CHLORIDE, SODIUM LACTATE, POTASSIUM CHLORIDE, AND CALCIUM CHLORIDE 75 ML/HR: .6; .31; .03; .02 INJECTION, SOLUTION INTRAVENOUS at 00:29

## 2022-10-19 RX ADMIN — ACETAMINOPHEN 975 MG: 325 TABLET, FILM COATED ORAL at 05:56

## 2022-10-19 RX ADMIN — CEFAZOLIN SODIUM 2000 MG: 2 SOLUTION INTRAVENOUS at 00:33

## 2022-10-19 RX ADMIN — METRONIDAZOLE 500 MG: 500 INJECTION, SOLUTION INTRAVENOUS at 15:31

## 2022-10-19 RX ADMIN — PHYTONADIONE 2.5 MG: 5 TABLET ORAL at 13:01

## 2022-10-19 RX ADMIN — SODIUM CHLORIDE, SODIUM LACTATE, POTASSIUM CHLORIDE, AND CALCIUM CHLORIDE 75 ML/HR: .6; .31; .03; .02 INJECTION, SOLUTION INTRAVENOUS at 20:10

## 2022-10-19 RX ADMIN — CEFAZOLIN SODIUM 2000 MG: 2 SOLUTION INTRAVENOUS at 22:30

## 2022-10-19 RX ADMIN — CEFAZOLIN SODIUM 2000 MG: 2 SOLUTION INTRAVENOUS at 14:33

## 2022-10-19 RX ADMIN — ATORVASTATIN CALCIUM 40 MG: 40 TABLET, FILM COATED ORAL at 19:25

## 2022-10-19 RX ADMIN — INSULIN LISPRO 4 UNITS: 100 INJECTION, SOLUTION INTRAVENOUS; SUBCUTANEOUS at 01:12

## 2022-10-19 RX ADMIN — SCOPALAMINE 1 PATCH: 1 PATCH, EXTENDED RELEASE TRANSDERMAL at 18:37

## 2022-10-19 RX ADMIN — CEFAZOLIN SODIUM 2000 MG: 2 SOLUTION INTRAVENOUS at 08:23

## 2022-10-19 RX ADMIN — Medication 10 ML: at 17:20

## 2022-10-19 RX ADMIN — MAGNESIUM SULFATE HEPTAHYDRATE 2 G: 40 INJECTION, SOLUTION INTRAVENOUS at 09:10

## 2022-10-19 RX ADMIN — ACETAMINOPHEN 975 MG: 325 TABLET, FILM COATED ORAL at 00:38

## 2022-10-19 RX ADMIN — HYDROMORPHONE HYDROCHLORIDE 0.2 MG: 0.2 INJECTION, SOLUTION INTRAMUSCULAR; INTRAVENOUS; SUBCUTANEOUS at 07:36

## 2022-10-19 NOTE — ED CARE HANDOFF
Emergency Department Sign Out Note        Sign out and transfer of care from [facility]  See Separate Emergency Department note  The patient, Tima Shine, was evaluated by the previous provider for abdominal pain, fever and malaise  Workup Completed:  Labs and CT    ED Course / Workup Pending (followup):  CT reveals acute cholecystitis, suggestion of possible obstructed cholecystostomy tube  Admit patient to general surgery's service after evaluation by surgery resident, Dr Hedy Alvarez                                     Procedures  MDM        Disposition  Final diagnoses:   Acute cholecystitis   S/P AVR   PAF (paroxysmal atrial fibrillation) ([facility] )   Supratherapeutic INR     Time reflects when diagnosis was documented in both MDM as applicable and the Disposition within this note     Time User Action Codes Description Comment    10/18/2022 11:13 PM Derrick Ovalles Add [K81 0] Acute cholecystitis     10/18/2022 11:29 PM Sage Maynard Modify [K81 0] Acute cholecystitis     10/19/2022  9:35 AM Aleck Lyn Add [Z95 2] S/P AVR     10/19/2022  9:35 AM Aleck Lyn Add [I48 0] PAF (paroxysmal atrial fibrillation) ([facility] )     10/19/2022  9:35 AM Aleck Lyn Add [R79 1] Supratherapeutic INR       ED Disposition     ED Disposition   Admit    Condition   Stable    Date/Time   Tue Oct 18, 2022 11:28 PM    Comment   Case was discussed with Dr Hedy Alvarez and the patient's admission status was agreed to be Admission Status: inpatient status to the service of Dr Hedy Alvarez             Follow-up Information    None       Current Discharge Medication List      CONTINUE these medications which have NOT CHANGED    Details   acetaminophen (TYLENOL) 325 mg tablet Take 2 tablets by mouth every 6 (six) hours as needed for mild pain or fever  Qty: 30 tablet, Refills: 0      albuterol (PROVENTIL HFA,VENTOLIN HFA) 90 mcg/act inhaler     Comments: Substitution to a formulary equivalent within the same pharmaceutical class is authorized  atorvastatin (LIPITOR) 40 mg tablet Take 40 mg by mouth daily after dinner Atorvastatin Calcium 40 MG Oral Tablet Take 1 tablet daily  Refills: 0  Active       B Complex-C (SUPER B COMPLEX PO) Take 1 capsule by mouth daily       colchicine (COLCRYS) 0 6 mg tablet Take 0 6 mg by mouth as needed for muscle/joint pain      fluticasone (FLONASE) 50 mcg/act nasal spray 2 sprays as needed Shake liquid and spray       gabapentin (NEURONTIN) 100 mg capsule Take 100 mg by mouth daily      HYDROcodone-acetaminophen (NORCO) 5-325 mg per tablet Take 1 tablet by mouth every 6 (six) hours as needed      Iron Combinations (NIFEREX) TABS Take 1 tablet by mouth in the morning  Refills: 5      metFORMIN (GLUCOPHAGE) 1000 MG tablet Take 1,000 mg by mouth daily after dinner MetFORMIN HCl 1000 MG (MOD) TB24 Take one tablet daily  Refills: 0  Active       Multiple Vitamin (MULTIVITAMINS PO) Take 1 tablet by mouth daily      mupirocin (BACTROBAN) 2 % ointment as needed       omeprazole (PriLOSEC) 20 mg delayed release capsule Omeprazole 20 MG Oral Tablet Delayed Release Take 1 tablet daily  Refills: 0  Active      polyethylene glycol (GLYCOLAX) 17 GM/SCOOP powder Take 17 g by mouth 2 (two) times a day      potassium chloride (K-DUR,KLOR-CON) 20 mEq tablet Take 20 mEq by mouth daily       !! sodium chloride, PF, 0 9 % 10 mL by Intracatheter route daily Intracatheter flushing daily  Qty: 300 mL, Refills: 3    Associated Diagnoses: Acute cholecystitis      !! sodium chloride, PF, 0 9 % 10 mL by Intracatheter route daily Intracatheter flushing daily  Qty: 1200 mL, Refills: 0    Associated Diagnoses: Acute cholecystitis      sotalol (BETAPACE) 80 mg tablet Take 80 mg by mouth every 12 (twelve) hours   Refills: 2      torsemide (DEMADEX) 20 mg tablet TAKE 3 TABLETS(60 MG TOTAL) BY MOUTH EVERY DAY   CAN TAKE 40 MG IN THE AM AND 20 MG IN THE AFTERNOON  Qty: 270 tablet, Refills: 3    Comments: **Patient requests 90 days supply**  Associated Diagnoses: SOB (shortness of breath); Diastolic congestive heart failure, unspecified HF chronicity (HCC)      warfarin (COUMADIN) 2 mg tablet Acknowledge  Qty: 14 tablet, Refills: 0    Associated Diagnoses: PA[facility]ox[facility] fibrillation) [facility]       ! ! - Potential duplicate medications found  Please discuss with provider  No discharge procedures on file         ED Provider  Electronically Signed by     Val Ewing DO  10/19/22 [id number]

## 2022-10-19 NOTE — ASSESSMENT & PLAN NOTE
Lab Results   Component Value Date    EGFR 30 10/19/2022    EGFR 30 10/18/2022    EGFR 34 09/03/2022    CREATININE 1 93 (H) 10/19/2022    CREATININE 1 92 (H) 10/18/2022    CREATININE 1 76 (H) 09/03/2022     Patient's baseline renal function appears to be 1 6-1 9  Daily BMP  Continue torsemide

## 2022-10-19 NOTE — ASSESSMENT & PLAN NOTE
Plan:  · IR tube check/exchanged once INR <1 5  · Patient is currently stable, no need for rapid anticoagulation  · Vitamin K 2 5 mg given x1  · Recheck INR in a m    · Ancef/Flagyl  · Patient becomes unstable/rapid anticoagulation given, consider IV vitamin K + [facility]  · Heparin for DVT ppx once INR <2, given hx of DVT

## 2022-10-19 NOTE — ASSESSMENT & PLAN NOTE
Lab Results   Component Value Date    HGBA1C 6 3 (H) 03/01/2022       Recent Labs     10/19/22  0101 10/19/22  0544 10/19/22  0739 10/19/22  1154   POCGLU 164* 108 126 137       Plan:  [facility]  Hypoglycemia protocol  Q 6 sugar checks while NPO  When diet resumed, diabetic diet

## 2022-10-19 NOTE — ASSESSMENT & PLAN NOTE
Plan:  · IR tube exchanged yesterday and with bloody discharge today  · INR remains elevated today, surgery ordered FFP 2 units  S/p 2 5 mg of vitamin K yesterday    · Continue Ancef and Flagyl per primary team she  · Goal INR 2-3

## 2022-10-19 NOTE — CONSULTS
[facility] 1935, 81 y o  male MRN: [id number]  Unit/Bed#: W -01 Encounter: [id number]  Primary Care Provider: Kevin Olivo DO   Date and time admitted to hospital: 10/18/2022  5:38 PM    Inpatient consult to Internal Medicine  Consult performed by: Marina Gonzales MD  Consult ordered by: Philip Zafar PA-C        Acute cholecystitis  Assessment & Plan  Plan:  · IR tube check/exchanged  Per surgery AP, IR is okay with elevated INR  · Patient is currently stable, no need for rapid anticoagulation  · Vitamin K 2 5 mg given x1  · Recheck INR in a m    · Ancef/Flagyl  · If patient becomes unstable/rapid anticoagulation needed, consider IV vitamin K + PCC  · Goal INR 2-3    Paroxysmal atrial fibrillation (HCC)  Assessment & Plan  Holding warfarin due to supratheraputic level, goal INR 2-3  Continue sotalol, will decrease dose adjust to 80 mg once daily due to renal function    Type 2 diabetes mellitus with diabetic neuropathy, without long-term current use of insulin [facility])  Assessment & Plan  Lab Results   Component Value Date    HGBA1C 6 3 (H) 03/01/2022       Recent Labs     10/19/22  0101 10/19/22  0544 10/19/22  0739 10/19/22  1154   POCGLU 164* 108 126 137       Plan:  SSI  Hypoglycemia protocol  Q 6 sugar checks while NPO  When diet resumed, diabetic diet    Hypomagnesemia  Assessment & Plan  Currently 1 8, goal greater than/equal to 2  Will replete    Chronic deep vein thrombosis (DVT) (HCC)  Assessment & Plan  History lower extremity DVT in 2015  Goal INR 2-3    OBINNA (obstructive sleep apnea)  Assessment & Plan  Will order CPAP while inpatient    Diastolic congestive heart failure [facility])  Assessment & Plan  Wt Readings from Last 3 Encounters:   09/26/22 (!) 139 kg (307 lb)   09/20/22 (!) 145 kg (319 lb)   09/03/22 (!) 144 kg (317 lb 10 9 oz)       Will check patient's current weight      Stage 3a chronic kidney disease with elevated creatinine  Assessment & Plan  Lab Results   Component Value Date    EGFR 30 10/19/2022    EGFR 30 10/18/2022    EGFR 34 09/03/2022    CREATININE 1 93 (H) 10/19/2022    CREATININE 1 92 (H) 10/18/2022    CREATININE 1 76 (H) 09/03/2022     Patient's baseline renal function appears to be 1 6-1 9  Daily BMP  Continue torsemide       VTE Prophylaxis: VTE Score: 7 High Risk (Score >/= 5) - Pharmacological DVT Prophylaxis Ordered: heparin  Sequential Compression Devices Ordered  Recommendations for Discharge:  · Pending perc jeny exchange    Collaboration of Care: Were Recommendations Directly Discussed with Primary Treatment Team? Yes    History of Present Illness:  Helder Williamson is a 81 y o  male who is originally admitted to the surgery service due to recurrent cholecystitis  We are consulted for anticoagulation reversal and management of chronic medical conditions  Past medical history AFib on Coumadin, bioprosthetic aortic valve replacement 2014, chronic DVT from 2015, AICD, CHF, HTN, HLD, CKD3  Patient had percutaneous cholecystostomy tube placed 9/1 for cholecystitis  Patient was recovering well at home  Patient had appointment with surgery 9/26 I which time he agreed to future cholecystectomy  Within 1 week of office visit, patient began experiencing gradually increasing abdominal pain/malaise  Patient versus anorexia, subjective fevers, diarrhea  Few days prior to admission, patient had decreased output from drain as well as difficulty flushing the drain  Patient presented to ED where CT abdomen revealed distended gallbladder, despite proper placement of tube, with stones and evidence of inflammation/phlegmon around rectus muscle  VSS, patient afebrile, no leukocytosis  T bili still elevated  Patient's INR supratherapeutic 3 95  Creatinine appears to be her baseline 1 6-1 9  Per surgery, plan for tube check and possible replacement with IR    Per gen surg AP, IR is okay with supratherapitic INR   Goal INR 2-3  Per chart review, patient took a few days for his INR to become subtherapeutic for initial perc jeny placement, despite 2 doses with K 2 5 mg plus 4 units FFP  Review of Systems:  Review of Systems   Constitutional: Positive for appetite change, chills and fatigue  Negative for fever  HENT: Negative for rhinorrhea and sore throat  Eyes: Negative for pain and redness  Respiratory: Negative for chest tightness and shortness of breath  Cardiovascular: Negative for chest pain  Gastrointestinal: Positive for abdominal pain and diarrhea  Negative for blood in stool, constipation, nausea and vomiting  Genitourinary: Negative for difficulty urinating  Neurological: Negative for light-headedness and headaches         Past Medical and Surgical History:   Past Medical History:   Diagnosis Date   • Anemia    • Arthritis    • Atrial fibrillation ([facility] [address] )    • Basal cell carcinoma 03/22/2022    Tip of Nose   • CHF (congestive heart failure) (HCC)    • Diabetes mellitus (HCC)     Niddm   • DVT (deep vein thrombosis) in pregnancy 1966    not in pregnancy   • DVT (deep venous thrombosis) ([facility] [address] ) 1966   • Dyslipidemia    • GERD (gastroesophageal reflux disease)    • Hyperlipidemia    • Hypertension    • Irregular heart beat     Afib   • Morbid obesity due to excess calories ([address] )     Resolved 9/2/2014    • Pulmonary embolism (HCC)    • Sepsis ([facility] [address] )    • Squamous cell skin cancer 07/30/2020    Left posterior scalp   • Visual impairment        Past Surgical History:   Procedure Laterality Date   • AORTIC VALVE REPLACEMENT     • CARDIAC DEFIBRILLATOR PLACEMENT  04/2014   • CARDIAC SURGERY  02/2014    AVR   • COLONOSCOPY     • INSERT / REPLACE / REMOVE PACEMAKER     • IR CHOLECYSTOSTOMY TUBE CHECK/CHANGE/REPOSITION/REINSERTION/UPSIZE  10/13/2022   • IR CHOLECYSTOSTOMY TUBE PLACEMENT  9/1/2022   • JOINT REPLACEMENT Left     LTKR   • MOHS SURGERY  07/30/2020    Left posterior scalp, Dr Ashley Mendoza   • MOHS SURGERY  2022    [facility] Tip of Nose- Dr Ashley Mendoza   • WI LIGATE/STRIP LONG [address] SEP-FEM JUN Right 2018    Procedure: LEG PERFORATED INJECTION SCLEROTHERAPY;  Surgeon: Dayami Guadarrama MD;  Location: [facility]  MAIN OR;  Service: Vascular   • REPLACEMENT TOTAL KNEE Right    • TOTAL KNEE ARTHROPLASTY Left    • VASCULAR SURGERY     • VENA CAVA FILTER PLACEMENT      Interruption inferior vena cava, Josue filter, placement   • WISDOM TOOTH EXTRACTION         Meds/Allergies:  PTA meds:   Prior to Admission Medications   Prescriptions Last Dose Informant Patient Reported? Taking?    B Complex-C (SUPER B COMPLEX PO)  Self Yes No   Sig: Take 1 capsule by mouth daily    HYDROcodone-acetaminophen (NORCO) 5-325 mg per tablet  Self Yes No   Sig: Take 1 tablet by mouth every 6 (six) hours as needed   Iron Combinations (NIFEREX) TABS  Self Yes No   Sig: Take 1 tablet by mouth in the morning   Multiple Vitamin (MULTIVITAMINS PO)  Self Yes No   Sig: Take 1 tablet by mouth daily   acetaminophen (TYLENOL) 325 mg tablet  Self No No   Sig: Take 2 tablets by mouth every 6 (six) hours as needed for mild pain or fever   albuterol (PROVENTIL HFA,VENTOLIN HFA) 90 mcg/act inhaler  Self Yes No   atorvastatin (LIPITOR) 40 mg tablet  Self Yes No   Sig: Take 40 mg by mouth daily after dinner Atorvastatin Calcium 40 MG Oral Tablet Take 1 tablet daily  Refills: 0  Active    colchicine (COLCRYS) 0 6 mg tablet  Self Yes No   Sig: Take 0 6 mg by mouth as needed for muscle/joint pain   fluticasone (FLONASE) 50 mcg/act nasal spray  Self Yes No   Si sprays as needed Shake liquid and spray    gabapentin (NEURONTIN) 100 mg capsule  Self Yes No   Sig: Take 100 mg by mouth daily   metFORMIN (GLUCOPHAGE) 1000 MG tablet  Self Yes No   Sig: Take 1,000 mg by mouth daily after dinner MetFORMIN HCl 1000 MG (MOD) TB24 Take one tablet daily  Refills: 0  Active    mupirocin (BACTROBAN) 2 % ointment  Self Yes No   Sig: as needed omeprazole (PriLOSEC) 20 mg delayed release capsule  Self Yes No   Sig: Omeprazole 20 MG Oral Tablet Delayed Release Take 1 tablet daily  Refills: 0  Active   polyethylene glycol (GLYCOLAX) 17 GM/SCOOP powder  Self Yes No   Sig: Take 17 g by mouth 2 (two) times a day   potassium chloride (K-DUR,KLOR-CON) 20 mEq tablet  Self Yes No   Sig: Take 20 mEq by mouth daily    sodium chloride, PF, 0 9 %   No No   Sig: 10 mL by Intracatheter route daily Intracatheter flushing daily   sodium chloride, PF, 0 9 %   No No   Sig: 10 mL by Intracatheter route daily Intracatheter flushing daily   sotalol (BETAPACE) 80 mg tablet  Self Yes No   Sig: Take 80 mg by mouth every 12 (twelve) hours    torsemide (DEMADEX) 20 mg tablet  Self No No   Sig: TAKE 3 TABLETS(60 MG TOTAL) BY MOUTH EVERY DAY  CAN TAKE 40 MG IN THE AM AND 20 MG IN THE AFTERNOON   warfarin (COUMADIN) 2 mg tablet   No No   Sig: Acknowledge   Patient taking differently: Acknowledge patient has 4 mg and 6 mg in home  Indications: Atrial Fibrillation, and DVT      Facility-Administered Medications: None       Allergies:    Allergies   Allergen Reactions   • Tramadol Other (See Comments)     intolerance       Social History:  Marital Status: /Civil Union  Substance Use History:   Social History     Substance and Sexual Activity   Alcohol Use Not Currently    Comment: no alcohol in 28 yrs     Social History     Tobacco Use   Smoking Status Never Smoker   Smokeless Tobacco Never Used     Social History     Substance and Sexual Activity   Drug Use Never       Family History:  Family History   Problem Relation Age of Onset   • Arthritis Mother    • Stroke Mother    • Arthritis Father    • Cancer Father    • Arthritis Daughter        Physical Exam:   Vitals:   Blood Pressure: 144/61 (10/19/22 1323)  Pulse: (!) 54 (10/19/22 1323)  Temperature: 97 8 °F (36 6 °C) (10/19/22 0724)  Temp Source: Oral (10/19/22 0724)  Respirations: 18 (10/19/22 0315)  SpO2: 99 % (10/19/22 1323)    Physical Exam  Vitals and nursing note reviewed  Constitutional:       Appearance: He is well-developed  HENT:      Head: Normocephalic and atraumatic  Eyes:      Conjunctiva/sclera: Conjunctivae normal    Cardiovascular:      Rate and Rhythm: Normal rate  Rhythm irregular  Heart sounds: No murmur heard  Pulmonary:      Effort: Pulmonary effort is normal  No respiratory distress  Breath sounds: Normal breath sounds  No wheezing  Abdominal:      Palpations: Abdomen is soft  Tenderness: There is abdominal tenderness  There is no guarding  Musculoskeletal:      Cervical back: Neck supple  Skin:     General: Skin is warm and dry  Neurological:      General: No focal deficit present  Mental Status: He is alert and oriented to person, place, and time            Additional Data:   Lab Results:    Results from last 7 days   Lab Units 10/19/22  0545   WBC Thousand/uL 7 85   HEMOGLOBIN g/dL 9 2*   HEMATOCRIT % 29 6*   PLATELETS Thousands/uL 251   NEUTROS PCT % 72   LYMPHS PCT % 16   MONOS PCT % 9   EOS PCT % 2     Results from last 7 days   Lab Units 10/19/22  0545   SODIUM mmol/L 140   POTASSIUM mmol/L 3 7   CHLORIDE mmol/L 103   CO2 mmol/L 27   BUN mg/dL 50*   CREATININE mg/dL 1 93*   ANION GAP mmol/L 10   CALCIUM mg/dL 9 3   ALBUMIN g/dL 3 3*   TOTAL BILIRUBIN mg/dL 1 50*   ALK PHOS U/L 80   ALT U/L 49   AST U/L 49*   GLUCOSE RANDOM mg/dL 113     Results from last 7 days   Lab Units 10/19/22  0545   INR  4 36*         Lab Results   Component Value Date/Time    HGBA1C 6 3 (H) 03/01/2022 09:03 AM    HGBA1C 6 2 (H) 12/09/2021 08:40 AM    HGBA1C 6 3 (H) 09/13/2021 08:17 AM    HGBA1C 6 4 (H) 05/04/2021 08:25 AM     Results from last 7 days   Lab Units 10/19/22  1154 10/19/22  0739 10/19/22  0544 10/19/22  0101   POC GLUCOSE mg/dl 137 126 108 164*     Results from last 7 days   Lab Units 10/18/22  1847   LACTIC ACID mmol/L 1 2   PROCALCITONIN ng/ml 0 10       Imaging: Reviewed radiology reports from this admission including: abdominal/pelvic CT and Personally reviewed the following imaging: abdominal/pelvic CT  PE Study with CT Abdomen and Pelvis with contrast   Final Result by Reynold Nino MD (10/18 2057)      1  No filling defect to suggest acute or chronic pulmonary embolism in the entire pulmonary arterial system  Measured RV/LV ratio is within normal limits at less than 0 9      2   Cholelithiasis with distended gallbladder and inflammation of the gallbladder wall and adjacent fat despite appropriately positioned cholecystostomy tube, likely related to obstructed catheter  There are inflammatory changes about the catheter tract    with probable phlegmon splaying the rectus abdominis musculature without discrete collection/abscess  The study was marked in EPIC for significant notification  Workstation performed: [id number]         XR chest 1 view portable   Final Result by Reynold Nino MD (10/18 2058)      Stable mild central pulmonary vascular congestion and cardiomegaly  Workstation performed: [id number]         IR cholecystostomy tube check/change/reposition/reinsertion/upsize    (Results Pending)       EKG, Pathology, and Other Studies Reviewed on Admission:   · EKG: Atrial fibrillation  HR 75     ** Please Note: This note may have been constructed using a voice recognition system  **

## 2022-10-19 NOTE — PROGRESS NOTES
Progress Note - general Surgery  Lady Metzger 81 y o  male MRN: [id number]  Unit/Bed#: ED-02 Encounter: [id number]    Assessment:  81 y o  male now with cholecystitis s/p perc cholecystostomy tube placed on 09/01  CT scan suggested potential obstruction of the drainage tube  Tube was able to be flushed, but there is no return from the drain  Plan:  - Diet NPO; Sips with meds  - Pain and Nausea control PRN  - Incentive spirometry  - OOB, ambulate  - monitor drain output  - IR for tube check today  - monitor INR    Subjective/Objective     Subjective:  Patient feels better this morning and states that his pain has significantly improved, now only located in the LUQ in RUQ  He denies any nausea  Objective:   Vitals: Blood pressure 109/82, pulse 61, temperature 98 4 °F (36 9 °C), temperature source Oral, resp  rate 18, SpO2 95 %  ,There is no height or weight on file to calculate BMI  I/O       10/17 0701  10/18 0700 10/18 0701  10/19 0700 10/19 0701  10/20 0700    IV Piggyback  750     Total Intake  750     Net  +750                  Physical Exam:  Gen: NAD, Aox3, Comfortable in bed  Chest: Normal work of breathing, no respiratory distress  Abd: Soft, ND, LUQ in RUQ tenderness improved  Drain is purulent  Ext: No Edema  Skin: Warm, Dry, Intact      Lab, Imaging and other studies:   I have personally reviewed pertinent reports    , CBC with diff:   Lab Results   Component Value Date    WBC 7 85 10/19/2022    HGB 9 2 (L) 10/19/2022    HCT 29 6 (L) 10/19/2022    MCV 87 10/19/2022     10/19/2022    MCH 27 1 10/19/2022    MCHC 31 1 (L) 10/19/2022    RDW 17 4 (H) 10/19/2022    MPV 9 7 10/19/2022    NRBC 0 10/19/2022   , BMP/CMP:   Lab Results   Component Value Date    SODIUM 140 10/19/2022    K 3 7 10/19/2022     10/19/2022    CO2 27 10/19/2022    BUN 50 (H) 10/19/2022    CREATININE 1 93 (H) 10/19/2022    CALCIUM 9 3 10/19/2022    AST 49 (H) 10/19/2022    ALT 49 10/19/2022    ALKPHOS 80 10/19/2022    EGFR 30 10/19/2022   , Magnesium: No components found for: MAG, Coags:   Lab Results   Component Value Date    PTT 67 (H) 10/18/2022    INR 4 36 (H) 10/19/2022     VTE Pharmacologic Prophylaxis: Sequential compression device (Venodyne)   VTE Mechanical Prophylaxis: sequential compression device        Edouard Driscoll  10/19/2022  7:33 AM

## 2022-10-19 NOTE — ASSESSMENT & PLAN NOTE
Wt Readings from Last 3 Encounters:   09/26/22 (!) 139 kg (307 lb)   09/20/22 (!) 145 kg (319 lb)   09/03/22 (!) 144 kg (317 lb 10 9 oz)       Without shortness of breath or symptoms suggestive of failure  Zyclara Pregnancy And Lactation Text: This medication is Pregnancy Category C. It is unknown if this medication is excreted in breast milk.

## 2022-10-19 NOTE — ASSESSMENT & PLAN NOTE
Wt Readings from Last 3 Encounters:   09/26/22 (!) 139 kg (307 lb)   09/20/22 (!) 145 kg (319 lb)   09/03/22 (!) 144 kg (317 lb 10 9 oz)       Will check patient's current weight

## 2022-10-19 NOTE — ASSESSMENT & PLAN NOTE
Lab Results   Component Value Date    HGBA1C 6 3 (H) 03/01/2022       Recent Labs     10/19/22  0101 10/19/22  0544 10/19/22  0739 10/19/22  1154   POCGLU 164* 108 126 137       Plan:  Continue insulin sliding scale with hypoglycemia protocol    Carb controlled diet when eating

## 2022-10-19 NOTE — SEDATION DOCUMENTATION
Perc choley tube exchange completed  Removal 75ml purulent fluid  Sutured in placed and transferred back to room via bed

## 2022-10-19 NOTE — H&P
General surgery  History and Physical  Max Lopez 81 y o  male MRN: [id number]  Unit/Bed#: ED-02 Encounter: [id number]    Assessment and Plan:  80yo M with multiple comorbidities presents with prolonged abdominal pain consistent with recurrent cholecystitis  Patient initially had a perc cholecystostomy tube placed on 09/01  He began feeling pain and weakness over the last 2-3 weeks  CT scan now suggest a distended gallbladder with innumerable stones seen within, likely pointing to obstruction of the IR drain  This is further supported by patient's difficulty in flushing the drain and aunts recent decrease in output despite CT showing that the gallbladder is distended  Patient is currently hemodynamically stable without fever or leukocytosis, however imaging does suggest cholecystitis  Patient's INR is supratherapeutic at 3 95, and he has a hyperbilirubinemia of 1 6 from 3 3     - will admit to general surgery service  - will reach out to IR for tube check and possible replacement  - NPO IV fluids  - Ancef Flagyl  - hold anticoagulation at this time as he is supratherapeutic with INR of 3 95      History of Present Illness     HPI:  Max Lopez is a 81 y o  male with history of AFib on Coumadin, aortic valve replacement, AICD, CHF, HTN, HLD, DVT/PE, , IVCFplacement SCC, BCC, cholecystitis with placement of percutaneous cholecystostomy tube 9/1 who presents with recurrent abdominal pain  Patient was initially seen in September for cholecystitis where he underwent placement of a IR perc cholecystostomy tube on 09/01  Since discharge from the hospital patient has been seen in the office as recently as 9/26, at which time it was agreed that he would receive an interval cholecystectomy after being cleared by Cardiology and allowing time for resolution of the acute inflammatory state      Patient states that within a week of being seen in the office he began experiencing gradually increasing abdominal pain and generalized malaise and energy loss  He has had poor p o  intake since this time, and reports having episodes of diarrhea and subjective fevers  Per patient his wife over the past several days they have had difficulty flushing the drain, and output has decreased to almost nothing  A CT was performed showing stones with the distended gallbladder and inflammation despite placement of the cholecystostomy to, likely related to obstructive cath, as well as probable phlegmon how around the area of the rectus muscle  Physical exam shows moderate diffuse tenderness  Patient is afebrile with normal white count, however he still retains an elevation in his bilirubin of 1 6 from previously recorded values of 3 3  Patient's INR is supratherapeutic at 3 95, and he appears to have a baseline renal dysfunction with creatinine ranging between 1 6-1 9  Review of Systems   Review of systems otherwise negative except for as listed above      Historical Information   Past Medical History:   Diagnosis Date   • Anemia    • Arthritis    • Atrial fibrillation ([address] )    • Basal cell carcinoma 03/22/2022    Tip of Nose   • CHF (congestive heart failure) (HCC)    • Diabetes mellitus (HCC)     Niddm   • DVT (deep vein thrombosis) in pregnancy 1966    not in pregnancy   • DVT (deep venous thrombosis) ([address] ) 1966   • Dyslipidemia    • GERD (gastroesophageal reflux disease)    • Hyperlipidemia    • Hypertension    • Irregular heart beat     Afib   • Morbid obesity due to excess calories ([address] )     Resolved 9/2/2014    • Pulmonary embolism (HCC)    • Sepsis ([address] )    • Squamous cell skin cancer 07/30/2020    Left posterior scalp   • Visual impairment      Past Surgical History:   Procedure Laterality Date   • AORTIC VALVE REPLACEMENT     • CARDIAC DEFIBRILLATOR PLACEMENT  04/2014   • CARDIAC SURGERY  02/2014    AVR   • COLONOSCOPY     • INSERT / REPLACE / REMOVE PACEMAKER     • IR CHOLECYSTOSTOMY TUBE CHECK/CHANGE/REPOSITION/REINSERTION/UPSIZE  10/13/2022   • IR CHOLECYSTOSTOMY TUBE PLACEMENT  9/1/2022   • JOINT REPLACEMENT Left     LTKR   • MOHS SURGERY  07/30/2020    Left posterior scalp, Dr Elba Damon   • [address]  04/18/2022    [facility] Tip of Nose- Dr Elba Damon   • OR LIGATE/STRIP LONG [address] SEP-FEM JUNC Right 8/17/2018    Procedure: LEG PERFORATED INJECTION SCLEROTHERAPY;  Surgeon: Radha Andersen MD;  Location: [facility];  Service: Vascular   • REPLACEMENT TOTAL KNEE Right    • TOTAL KNEE ARTHROPLASTY Left    • VASCULAR SURGERY     • VENA CAVA FILTER PLACEMENT      Interruption inferior vena cava, Josue filter, placement   • WISDOM TOOTH EXTRACTION       Social History   Social History     Substance and Sexual Activity   Alcohol Use Not Currently     Social History     Substance and Sexual Activity   Drug Use No     Social History     Tobacco Use   Smoking Status Never Smoker   Smokeless Tobacco Never Used     Family History:   Family History   Problem Relation Age of Onset   • Arthritis Mother    • Stroke Mother    • Arthritis Father    • Cancer Father    • Arthritis Daughter        Meds/Allergies   PTA meds:   Prior to Admission Medications   Prescriptions Last Dose Informant Patient Reported? Taking?    B Complex-C (SUPER B COMPLEX PO)  Self Yes No   Sig: Take 1 capsule by mouth daily    HYDROcodone-acetaminophen (NORCO) 5-325 mg per tablet  Self Yes No   Sig: Take 1 tablet by mouth every 6 (six) hours as needed   Iron Combinations (NIFEREX) TABS  Self Yes No   Sig: Take 1 tablet by mouth in the morning   Multiple Vitamin (MULTIVITAMINS PO)  Self Yes No   Sig: Take 1 tablet by mouth daily   acetaminophen (TYLENOL) 325 mg tablet  Self No No   Sig: Take 2 tablets by mouth every 6 (six) hours as needed for mild pain or fever   albuterol (PROVENTIL HFA,VENTOLIN HFA) 90 mcg/act inhaler  Self Yes No   atorvastatin (LIPITOR) 40 mg tablet  Self Yes No   Sig: Take 40 mg by mouth daily after dinner Atorvastatin Calcium 40 MG Oral Tablet Take 1 tablet daily  Refills: 0  Active    colchicine (COLCRYS) 0 6 mg tablet  Self Yes No   Sig: Take 0 6 mg by mouth as needed for muscle/joint pain   fluticasone (FLONASE) 50 mcg/act nasal spray  Self Yes No   Si sprays as needed Shake liquid and spray    gabapentin (NEURONTIN) 100 mg capsule  Self Yes No   Sig: Take 100 mg by mouth daily   metFORMIN (GLUCOPHAGE) 1000 MG tablet  Self Yes No   Sig: Take 1,000 mg by mouth daily after dinner MetFORMIN HCl 1000 MG (MOD) TB24 Take one tablet daily  Refills: 0  Active    mupirocin (BACTROBAN) 2 % ointment  Self Yes No   Sig: as needed    omeprazole (PriLOSEC) 20 mg delayed release capsule  Self Yes No   Sig: Omeprazole 20 MG Oral Tablet Delayed Release Take 1 tablet daily  Refills: 0  Active   polyethylene glycol (GLYCOLAX) 17 GM/SCOOP powder  Self Yes No   Sig: Take 17 g by mouth 2 (two) times a day   potassium chloride (K-DUR,KLOR-CON) 20 mEq tablet  Self Yes No   Sig: Take 20 mEq by mouth daily    sodium chloride, PF, 0 9 %   No No   Sig: 10 mL by Intracatheter route daily Intracatheter flushing daily   sodium chloride, PF, 0 9 %   No No   Sig: 10 mL by Intracatheter route daily Intracatheter flushing daily   sotalol (BETAPACE) 80 mg tablet  Self Yes No   Sig: Take 80 mg by mouth every 12 (twelve) hours    torsemide (DEMADEX) 20 mg tablet  Self No No   Sig: TAKE 3 TABLETS(60 MG TOTAL) BY MOUTH EVERY DAY  CAN TAKE 40 MG IN THE AM AND 20 MG IN THE AFTERNOON   warfarin (COUMADIN) 2 mg tablet   No No   Sig: Acknowledge   Patient taking differently: Acknowledge patient has 4 mg and 6 mg in home     Indications: Atrial Fibrillation, and DVT      Facility-Administered Medications: None     Allergies   Allergen Reactions   • Tramadol Other (See Comments)     intolerance       Objective   First Vitals:   Blood Pressure: 118/57 (10/18/22 1750)  Pulse: 72 (10/18/22 1750)  Temperature: 98 4 °F (36 9 °C) (10/18/22 1752)  Temp Source: Oral (10/18/22 1752)  Respirations: 16 (10/18/22 1750)  SpO2: 97 % (10/18/22 1750)    Current Vitals:   Blood Pressure: 107/56 (10/18/22 2215)  Pulse: 78 (10/18/22 2215)  Temperature: 98 4 °F (36 9 °C) (10/18/22 1752)  Temp Source: Oral (10/18/22 1752)  Respirations: 20 (10/18/22 2215)  SpO2: 94 % (10/18/22 2215)      Intake/Output Summary (Last 24 hours) at 10/18/2022 2335  Last data filed at 10/18/2022 2215  Gross per 24 hour   Intake 600 ml   Output --   Net 600 ml       Invasive Devices  Report    Peripheral Intravenous Line  Duration           Peripheral IV 10/18/22 Right Antecubital <1 day                Physical Exam  Vitals reviewed  HENT:      Head: Normocephalic  Nose: Nose normal       Mouth/Throat:      Mouth: Mucous membranes are moist    Eyes:      Pupils: Pupils are equal, round, and reactive to light  Cardiovascular:      Pulses: Normal pulses  Pulmonary:      Effort: Pulmonary effort is normal    Abdominal:      Comments: Abdomen is soft with right upper quadrant IR drain with bilious output  He is diffusely tender more concentrated in the upper abdomen compared to lower abdomen  Mild guarding, no rebound  Musculoskeletal:         General: Normal range of motion  Skin:     General: Skin is warm  Capillary Refill: Capillary refill takes less than 2 seconds  Neurological:      General: No focal deficit present  Mental Status: He is alert and oriented to person, place, and time  Mental status is at baseline  Lab Results:   I have personally reviewed pertinent lab results    , CBC:   Lab Results   Component Value Date    WBC 7 94 10/18/2022    HGB 9 9 (L) 10/18/2022    HCT 31 5 (L) 10/18/2022    MCV 86 10/18/2022     10/18/2022    MCH 27 0 10/18/2022    MCHC 31 4 10/18/2022    RDW 17 3 (H) 10/18/2022    MPV 9 9 10/18/2022    NRBC 0 10/18/2022   , CMP:   Lab Results   Component Value Date    SODIUM 137 10/18/2022    K 4 2 10/18/2022    CL 98 10/18/2022    CO2 28 10/18/2022    BUN 54 (H) 10/18/2022    CREATININE 1 92 (H) 10/18/2022    CALCIUM 9 7 10/18/2022    AST 68 (H) 10/18/2022    ALT 61 (H) 10/18/2022    ALKPHOS 99 10/18/2022    EGFR 30 10/18/2022   , Coagulation:   Lab Results   Component Value Date    INR 3 95 (H) 10/18/2022   , Urinalysis:   Lab Results   Component Value Date    COLORU Light Yellow 10/18/2022    CLARITYU Clear 10/18/2022    SPECGRAV 1 010 10/18/2022    PHUR 5 0 10/18/2022    LEUKOCYTESUR Negative 10/18/2022    NITRITE Negative 10/18/2022    GLUCOSEU Negative 10/18/2022    KETONESU Negative 10/18/2022    BILIRUBINUR Negative 10/18/2022    BLOODU Negative 10/18/2022   , Lipase:   Lab Results   Component Value Date    LIPASE 26 10/18/2022     Imaging: I have personally reviewed pertinent reports  XR chest 1 view portable    Result Date: 10/18/2022  Impression: Stable mild central pulmonary vascular congestion and cardiomegaly  Workstation performed: [id number]     PE Study with CT Abdomen and Pelvis with contrast    Result Date: 10/18/2022  Impression: 1  No filling defect to suggest acute or chronic pulmonary embolism in the entire pulmonary arterial system  Measured RV/LV ratio is within normal limits at less than 0 9   2   Cholelithiasis with distended gallbladder and inflammation of the gallbladder wall and adjacent fat despite appropriately positioned cholecystostomy tube, likely related to obstructed catheter  There are inflammatory changes about the catheter tract with probable phlegmon splaying the rectus abdominis musculature without discrete collection/abscess  The study was marked in EPIC for significant notification  Workstation performed: [id number]     EKG, Pathology, and Other Studies: I have personally reviewed pertinent reports  Code Status: Prior  Advance Directive and Living Will:      Power of :    POLST:      Counseling / Coordination of Care  Total floor / unit time spent today 30 minutes   This involved direct patient contact where I performed a full history and physical, reviewed previous records, and reviewed laboratory and other diagnostic studies  Greater than 50% of total time was spent with the patient and / or family counseling and / or coordination of care      Maria Luisa Perera  10/18/2022

## 2022-10-19 NOTE — ASSESSMENT & PLAN NOTE
Warfarin held due to supratherapeutic INR  Plasma given today    Continue sotalol at reduced dose of 80 mg due to renal function

## 2022-10-19 NOTE — DISCHARGE INSTRUCTIONS
Acute Care Surgery Discharge Instructions    Please get your INR checked on 10/25 and 10/28  Please follow up with your primary care provider regarding checking your INR and discuss your home medications  Please follow-up as instructed or on an as needed basis  If you need a follow-up appointment, please call the office when you leave to schedule an appointment  Activity:  - You may resume activity as tolerated  Return to work:    - You are clear to return to work on discharge  Diet:    - You may resume your normal diet  Medications:  - You should continue your current medication regimenafter discharge unless otherwise instructed  Please refer to your discharge medication list for further details  - Please take the pain medications as directed  - You are encouraged to use non-narcotic pain medications first and whenever possible  Reserve the use of narcotic pain medication for moderate to severe pain not controlled by non-narcotic medications   - No driving while taking narcotic pain medications  - You may become constipated, especially if taking pain medications  You may take any over the counter stool softeners or laxatives as needed  Examples: Milk of Magnesia, Colace, Senna  Additional Instructions:  - May shower daily and/or bathe normally  - If you have any questions or concerns after discharge please call the office   - Call office or return to ER if fever greater than 101, chills, persistent nausea/vomiting, and/or worsening/uncontrollable pain  TUBE CARE INSTRUCTIONS    Care after your procedure:    Resume your normal diet  Small sips of flat soda will help with nausea  1  The properly functioning catheter should be forward flushed once (1x) daily with 10ml of normal saline using clean technique  You will be given a prescription for flushes  To flush the tube, clean both connections with alcohol swab  Twist off the drainage bag/ bulb  tubing and twist the saline syringe into the drainage tube and flush  Remove the syringe and twist the drainage bag / bulb tubing tubing back on     2  The drainage bag/bulb may be emptied as necessary  Keep a record of the amount of fluid you drain from your tube  This should be done with clean technique as well  3  A fresh dressing should be applied daily over the tube insertion site  4  As the tube is secured to the skin with only a suture,try not to pull on your tube  Tub baths are not permitted  Showers are permitted if the patient's skin entry site is prevented from getting wet  Similarly, washcloth "baths" are acceptable  Contact Interventional Radiology at [phone] Denn[place] PATIENTS: Contact Interventional Radiology at [phone]) Zulema Martins PATIENTS: Contact Interventional Radiology at [phone]) if:    1  Leakage or large amounts of liquid around the catheter  2  Fever of 101 degrees lasting several hours without other obvious cause (such as sore throat, flu, etc)  3  Persistent nausea or vomiting  4  Diminished drainage, which may be associated with pressure or pain  Or when the     drainage from your tube is less than 10mls for 48 hours  5  Catheter pulled back or falls out  The following pharmacies carry the flush syringes  [facility]                     [facility]  [address]  Phone [phone]            Phone [phone] [address]             [address] [facility]                                [phone] [address] [facility] [address]                      [address] [facility]  Phone [phone]            Phone [phone]                      [facility] [address]    [address]  Phone [phone]

## 2022-10-19 NOTE — ASSESSMENT & PLAN NOTE
Lab Results   Component Value Date    EGFR 30 10/19/2022    EGFR 30 10/18/2022    EGFR 34 09/03/2022    CREATININE 1 93 (H) 10/19/2022    CREATININE 1 92 (H) 10/18/2022    CREATININE 1 76 (H) 09/03/2022     Appears close to baseline of 1 6-1 9  Continue to trend daily BMP    Torsemide continued

## 2022-10-19 NOTE — ED PROVIDER NOTES
Discharge Planner History  Chief Complaint   Patient presents with   • Surgical Problem Re-Evaluation     PT had a gall bladder drain placed over a week ago and now presents with pain, discharge from site, and general malaise and some confusion     Patient is an 73-year-old male with a past medical history of her cholecystitis drain placement, DVT, dyslipidemia, sepsis arriving for evaluation of shortness of breath, and right-sided abdominal pain  Patient reports that he also had a fever on Wednesday of 101  Patient's daughter reports that patient has been doing poorly for several weeks since he was initially diagnosed cholecystitis common had a drain placed by IR  Patient endorses that over the past week he has had decrease in energy, and notes an objective fever as well  Patient denies any urinary symptoms  Patient reports that he has been short of breath with intermittent nonproductive cough  Patient reports pain palpation to right-sided  Patient has a drain in place, the drains to gravity  Wife endorses that this amount has been coming out over the past week  Noting that there has also been important discharge as well  Reports the patient was seen on 10/13, with no change to his course  Patient's reports that last night she tried to flush the drain, was more difficult to flush normal           Prior to Admission Medications   Prescriptions Last Dose Informant Patient Reported? Taking?    B Complex-C (SUPER B COMPLEX PO)  Self Yes No   Sig: Take 1 capsule by mouth daily    HYDROcodone-acetaminophen (NORCO) 5-325 mg per tablet  Self Yes No   Sig: Take 1 tablet by mouth every 6 (six) hours as needed   Iron Combinations (NIFEREX) TABS  Self Yes No   Sig: Take 1 tablet by mouth in the morning   Multiple Vitamin (MULTIVITAMINS PO)  Self Yes No   Sig: Take 1 tablet by mouth daily   acetaminophen (TYLENOL) 325 mg tablet  Self No No   Sig: Take 2 tablets by mouth every 6 (six) hours as needed for mild pain or fever   albuterol (PROVENTIL HFA,VENTOLIN HFA) 90 mcg/act inhaler  Self Yes No   atorvastatin (LIPITOR) 40 mg tablet  Self Yes No   Sig: Take 40 mg by mouth daily after dinner Atorvastatin Calcium 40 MG Oral Tablet Take 1 tablet daily  Refills: 0  Active    colchicine (COLCRYS) 0 6 mg tablet  Self Yes No   Sig: Take 0 6 mg by mouth as needed for muscle/joint pain   fluticasone (FLONASE) 50 mcg/act nasal spray  Self Yes No   Si sprays as needed Shake liquid and spray    gabapentin (NEURONTIN) 100 mg capsule  Self Yes No   Sig: Take 100 mg by mouth daily   metFORMIN (GLUCOPHAGE) 1000 MG tablet  Self Yes No   Sig: Take 1,000 mg by mouth daily after dinner MetFORMIN HCl 1000 MG (MOD) TB24 Take one tablet daily  Refills: 0  Active    mupirocin (BACTROBAN) 2 % ointment  Self Yes No   Sig: as needed    omeprazole (PriLOSEC) 20 mg delayed release capsule  Self Yes No   Sig: Omeprazole 20 MG Oral Tablet Delayed Release Take 1 tablet daily  Refills: 0  Active   polyethylene glycol (GLYCOLAX) 17 GM/SCOOP powder  Self Yes No   Sig: Take 17 g by mouth 2 (two) times a day   potassium chloride (K-DUR,KLOR-CON) 20 mEq tablet  Self Yes No   Sig: Take 20 mEq by mouth daily    sodium chloride, PF, 0 9 %   No No   Sig: 10 mL by Intracatheter route daily Intracatheter flushing daily   sodium chloride, PF, 0 9 %   No No   Sig: 10 mL by Intracatheter route daily Intracatheter flushing daily   sotalol (BETAPACE) 80 mg tablet  Self Yes No   Sig: Take 80 mg by mouth every 12 (twelve) hours    torsemide (DEMADEX) 20 mg tablet  Self No No   Sig: TAKE 3 TABLETS(60 MG TOTAL) BY MOUTH EVERY DAY  CAN TAKE 40 MG IN THE AM AND 20 MG IN THE AFTERNOON   warfarin (COUMADIN) 2 mg tablet   No No   Sig: Acknowledge   Patient taking differently: Acknowledge patient has 4 mg and 6 mg in home     Indications: Atrial Fibrillation, and DVT      Facility-Administered Medications: None       Past Medical History:   Diagnosis Date   • Anemia    • Arthritis    • Atrial fibrillation ([place] [address] )    • Basal cell carcinoma 03/22/2022    Tip of Nose   • CHF (congestive heart failure) (HCC)    • Diabetes mellitus ([place] [address] )     Niddm   • DVT (deep vein thrombosis) in pregnancy 1966    not in pregnancy   • DVT (deep venous thrombosis) ([place] [address] ) 1966   • Dyslipidemia    • GERD (gastroesophageal reflux disease)    • Hyperlipidemia    • Hypertension    • Irregular heart beat     Afib   • Morbid obesity due to excess calories ([place] [address] )     Resolved 9/2/2014    • Pulmonary embolism (HCC)    • Sepsis ([place] [address] )    • Squamous cell skin cancer 07/30/2020    Left posterior scalp   • Visual impairment        Past Surgical History:   Procedure Laterality Date   • AORTIC VALVE REPLACEMENT     • CARDIAC DEFIBRILLATOR PLACEMENT  04/2014   • CARDIAC SURGERY  02/2014    AVR   • COLONOSCOPY     • INSERT / REPLACE / REMOVE PACEMAKER     • IR CHOLECYSTOSTOMY TUBE CHECK/CHANGE/REPOSITION/REINSERTION/UPSIZE  10/13/2022   • IR CHOLECYSTOSTOMY TUBE PLACEMENT  9/1/2022   • JOINT REPLACEMENT Left     LTKR   • MOHS SURGERY  07/30/2020    Left posterior scalp, Dr Bing England   • [address]  04/18/2022    [facility] Tip of Nose- Dr Bing England   • AR LIGATE/STRIP LONG [address] SEP-FEM JUNC Right 8/17/2018    Procedure: LEG PERFORATED INJECTION SCLEROTHERAPY;  Surgeon: Morro Jaramillo MD;  Location: [facility]  MAIN OR;  Service: Vascular   • REPLACEMENT TOTAL KNEE Right    • TOTAL KNEE ARTHROPLASTY Left    • VASCULAR SURGERY     • VENA CAVA FILTER PLACEMENT      Interruption inferior vena cava, Siler filter, placement   • WISDOM TOOTH EXTRACTION         Family History   Problem Relation Age of Onset   • Arthritis Mother    • Stroke Mother    • Arthritis Father    • Cancer Father    • Arthritis Daughter      I have reviewed and agree with the history as documented      E-Cigarette/Vaping   • E-Cigarette Use Never User      E-Cigarette/Vaping Substances   • Nicotine No    • THC No    • CBD No    • Other No    • Unknown No      Social History Tobacco Use   • Smoking status: Never Smoker   • Smokeless tobacco: Never Used   Vaping Use   • Vaping Use: Never used   Substance Use Topics   • Alcohol use: Not Currently   • Drug use: No       Review of Systems   Constitutional: Positive for activity change, appetite change, fatigue and fever  Negative for diaphoresis  HENT: Negative  Eyes: Negative  Respiratory: Positive for cough and shortness of breath  Negative for apnea, choking, chest tightness, wheezing and stridor  Cardiovascular: Negative  Negative for chest pain, palpitations and leg swelling  Gastrointestinal: Positive for abdominal pain  Negative for nausea and vomiting  Endocrine: Negative  Genitourinary: Negative  Negative for dysuria  Musculoskeletal: Negative  Skin: Negative  Allergic/Immunologic: Negative  Neurological: Negative  Hematological: Negative  Psychiatric/Behavioral: Negative  All other systems reviewed and are negative  Physical Exam  Physical Exam  Vitals and nursing note reviewed  Constitutional:       General: He is not in acute distress  Appearance: Normal appearance  He is normal weight  He is not ill-appearing  HENT:      Head: Normocephalic  Right Ear: External ear normal       Left Ear: External ear normal       Nose: Nose normal       Mouth/Throat:      Mouth: Mucous membranes are moist    Eyes:      Extraocular Movements: Extraocular movements intact  Pupils: Pupils are equal, round, and reactive to light  Cardiovascular:      Rate and Rhythm: Normal rate and regular rhythm  Pulses: Normal pulses  Heart sounds: Normal heart sounds  Pulmonary:      Effort: Pulmonary effort is normal       Breath sounds: Normal breath sounds  Abdominal:      General: Abdomen is flat  Bowel sounds are normal       Palpations: Abdomen is soft  Tenderness: There is abdominal tenderness  There is no right CVA tenderness or left CVA tenderness  Musculoskeletal:         General: Normal range of motion  Cervical back: Normal range of motion and neck supple  No tenderness  Skin:     General: Skin is warm  Capillary Refill: Capillary refill takes less than 2 seconds  Neurological:      General: No focal deficit present  Mental Status: He is alert and oriented to person, place, and time  Mental status is at baseline  Psychiatric:         Mood and Affect: Mood normal          Vital Signs  ED Triage Vitals   Temperature Pulse Respirations Blood Pressure SpO2   10/18/22 1752 10/18/22 1750 10/18/22 1750 10/18/22 1750 10/18/22 1750   98 4 °F (36 9 °C) 72 16 118/57 97 %      Temp Source Heart Rate Source Patient Position - Orthostatic VS BP Location FiO2 (%)   10/18/22 1752 10/18/22 1800 10/18/22 1750 10/18/22 1750 --   Oral Monitor Lying Left arm       Pain Score       10/18/22 1945       No Pain           Vitals:    10/18/22 1750 10/18/22 1800 10/18/22 1845 10/18/22 1945   BP: 118/57 134/60 126/60 113/55   Pulse: 72 77 77 78   Patient Position - Orthostatic VS: Lying Lying Lying          Visual Acuity      ED Medications  Medications   sodium chloride 0 9 % bolus 500 mL (500 mL Intravenous New Bag 10/18/22 2019)   iohexol (OMNIPAQUE) 350 MG/ML injection (SINGLE-DOSE) 100 mL (100 mL Intravenous Given 10/18/22 2005)       Diagnostic Studies  Results Reviewed     Procedure Component Value Units Date/Time    HS Troponin 0hr (reflex protocol) [[id number]] Collected: 10/18/22 2009    Lab Status:  In process Specimen: Blood from Arm, Right Updated: 10/18/22 2012    Protime-INR [[id number]]  (Abnormal) Collected: 10/18/22 1847    Lab Status: Final result Specimen: Blood from Arm, Right Updated: 10/18/22 1946     Protime 38 9 seconds      INR 3 95    APTT [[id number]]  (Abnormal) Collected: 10/18/22 1847    Lab Status: Final result Specimen: Blood from Arm, Right Updated: 10/18/22 1946     PTT 67 seconds     UA w Reflex to Microscopic w Reflex to Culture [[id number]] Collected: 10/18/22 1931    Lab Status: Final result Specimen: Urine, Clean Catch Updated: 10/18/22 1946     Color, UA Light Yellow     Clarity, UA Clear     Specific Gravity, UA 1 010     pH, UA 5 0     Leukocytes, UA Negative     Nitrite, UA Negative     Protein, UA Negative mg/dl      Glucose, UA Negative mg/dl      Ketones, UA Negative mg/dl      Urobilinogen, UA <2 0 mg/dl      Bilirubin, UA Negative     Occult Blood, UA Negative    FLU/RSV/COVID - if FLU/RSV clinically relevant [[id number]]  (Normal) Collected: 10/18/22 1847    Lab Status: Final result Specimen: Nares from Nose Updated: 10/18/22 1939     SARS-CoV-2 Negative     INFLUENZA A PCR Negative     INFLUENZA B PCR Negative     RSV PCR Negative    Narrative:      FOR PEDIATRIC PATIENTS - copy/paste COVID Guidelines URL to browser: [email or web address]    SARS-CoV-2 assay is a Nucleic Acid Amplification assay intended for the  qualitative detection of nucleic acid from SARS-CoV-2 in nasopharyngeal  swabs  Results are for the presumptive identification of SARS-CoV-2 RNA  Positive results are indicative of infection with SARS-CoV-2, the virus  causing COVID-19, but do not rule out bacterial infection or co-infection  with other viruses  Laboratories within the [place] and its  territories are required to report all positive results to the appropriate  public health authorities  Negative results do not preclude SARS-CoV-2  infection and should not be used as the sole basis for treatment or other  patient management decisions  Negative results must be combined with  clinical observations, patient history, and epidemiological information  This test has not been FDA cleared or approved  This test has been authorized by FDA under an Emergency Use Authorization  (EUA)   This test is only authorized for the duration of time the  declaration that circumstances exist justifying the authorization of the  emergency use of an in vitro diagnostic tests for detection of SARS-CoV-2  virus and/or diagnosis of COVID-19 infection under section 564(b)(1) of  the Act, 21 U  S C  609UCY-1(Z)(0), unless the authorization is terminated  or revoked sooner  The test has been validated but independent review by FDA  and CLIA is pending  Test performed using [employer] GeneXpert: This RT-PCR assay targets N2,  a region unique to SARS-CoV-2  A conserved region in the E-gene was chosen  for pan-Sarbecovirus detection which includes SARS-CoV-2  According to CMS-2020-01-R, this platform meets the definition of high-throughput technology  Blood culture #2 [[id number]] Collected: 10/18/22 1926    Lab Status: In process Specimen: Blood from Arm, Left Updated: 10/18/22 1937    Procalcitonin [[id number]]  (Normal) Collected: 10/18/22 1847    Lab Status: Final result Specimen: Blood from Arm, Right Updated: 10/18/22 1925     Procalcitonin 0 10 ng/ml     Lactic acid [[id number]]  (Normal) Collected: 10/18/22 1847    Lab Status: Final result Specimen: Blood from Arm, Right Updated: 10/18/22 1915     LACTIC ACID 1 2 mmol/L     Narrative:      Result may be elevated if tourniquet was used during collection      Comprehensive metabolic panel [[id number]]  (Abnormal) Collected: 10/18/22 1847    Lab Status: Final result Specimen: Blood from Arm, Right Updated: 10/18/22 1915     Sodium 137 mmol/L      Potassium 4 2 mmol/L      Chloride 98 mmol/L      CO2 28 mmol/L      ANION GAP 11 mmol/L      BUN 54 mg/dL      Creatinine 1 92 mg/dL      Glucose 136 mg/dL      Calcium 9 7 mg/dL      AST 68 U/L      ALT 61 U/L      Alkaline Phosphatase 99 U/L      Total Protein 7 7 g/dL      Albumin 3 6 g/dL      Total Bilirubin 1 58 mg/dL      eGFR 30 ml/min/1 73sq m     Narrative:      [place] guidelines for Chronic Kidney Disease (CKD):   •  Stage 1 with normal or high GFR (GFR > 90 mL/min/1 73 square meters)  • Stage 2 Mild CKD (GFR = 60-89 mL/min/1 73 square meters)  •  Stage 3A Moderate CKD (GFR = 45-59 mL/min/1 73 square meters)  •  Stage 3B Moderate CKD (GFR = 30-44 mL/min/1 73 square meters)  •  Stage 4 Severe CKD (GFR = 15-29 mL/min/1 73 square meters)  •  Stage 5 End Stage CKD (GFR <15 mL/min/1 73 square meters)  Note: GFR calculation is accurate only with a steady state creatinine    Lipase [[id number]]  (Normal) Collected: 10/18/22 1847    Lab Status: Final result Specimen: Blood from Arm, Right Updated: 10/18/22 1915     Lipase 26 u/L     Magnesium [[id number]]  (Abnormal) Collected: 10/18/22 1847    Lab Status: Final result Specimen: Blood from Arm, Right Updated: 10/18/22 1915     Magnesium 1 8 mg/dL     CBC and differential [[id number]]  (Abnormal) Collected: 10/18/22 1847    Lab Status: Final result Specimen: Blood from Arm, Right Updated: 10/18/22 1903     WBC 7 94 Thousand/uL      RBC 3 66 Million/uL      Hemoglobin 9 9 g/dL      Hematocrit 31 5 %      MCV 86 fL      MCH 27 0 pg      MCHC 31 4 g/dL      RDW 17 3 %      MPV 9 9 fL      Platelets 808 Thousands/uL      nRBC 0 /100 WBCs      Neutrophils Relative 76 %      Immat GRANS % 0 %      Lymphocytes Relative 12 %      Monocytes Relative 10 %      Eosinophils Relative 2 %      Basophils Relative 0 %      Neutrophils Absolute 6 03 Thousands/µL      Immature Grans Absolute 0 03 Thousand/uL      Lymphocytes Absolute 0 92 Thousands/µL      Monocytes Absolute 0 77 Thousand/µL      Eosinophils Absolute 0 16 Thousand/µL      Basophils Absolute 0 03 Thousands/µL     Blood culture #1 [[id number]] Collected: 10/18/22 1847    Lab Status:  In process Specimen: Blood from Arm, Right Updated: 10/18/22 1856                 XR chest 1 view portable    (Results Pending)   PE Study with CT Abdomen and Pelvis with contrast    (Results Pending)              Procedures  ECG 12 Lead Documentation Only    Date/Time: 10/18/2022 6:22 PM  Performed by: Miriam Flores ODESSA  Authorized by: ODESSA Perea     Patient location:  ED  Previous ECG:     Previous ECG:  Compared to current    Similarity:  No change  Interpretation:     Interpretation: abnormal    Rate:     ECG rate:  75  Rhythm:     Rhythm: atrial fibrillation    Ectopy:     Ectopy: none    QRS:     QRS axis:  Normal  Conduction:     Conduction: abnormal      Abnormal conduction: complete LBBB    ST segments:     ST segments:  Normal  T waves:     T waves: normal               ED Course                                             MDM  Number of Diagnoses or Management Options  Acute cholecystitis  PAF (paroxysmal atrial fibrillation) (HCC)  S/P AVR  Supratherapeutic INR  Diagnosis management comments: DDx: PE, pneumonia, abdominal abscess  Review of records shows that pt/family called doctor's office today to report shortness of breath with note from answerer, "Pt whispered," and denoted, "DVT history," and recent surgery, sending them to the ED  Pt does endorse increasing shortness of breath as well  Pt was subtherapeutic on coumadin at last check earlier in the mouth  Pt appear chronically ill  Pt has drainage coming from right upper quadrant drain site, as well as into the collection  Wife reports consistent amount draining to gravity into bag  As pt is also reporting fever of 101 less than a week ago and during this acute phase illness, will check sepsis labs  Additionally, will PE a/p CT scan as pt has recently had a surgical procedure, been more sedentary, has been subtherapeutic on his coumadin, and reports shortness of breath  Shortness of breath was also documented by other medical provider in call discussion today  Will also r/o covid/flu/rsv as pt has been in and out of medical settings  Review of records shows IR evaluated catheter on 10/13 as well  Pt has slight increase to creatinine   Will continue with plan to r/o PE based on pt's own reporting of shortness of breath with aforementioned risk factors  Reached out to general surgery resident prior to CT read to come evaluated catheter as there is drainage from around it  CT findings appreciated, see read for specifics  No PE visualized  Again, reached back out to general surgery resident  Will initiate antibiotics while awaiting response from General Surgery Resident  Reviewed findings and plan for abx with family  Aware general surgery has been contacted for further recommendations  Pt has no leukocytotsis, no tachycardia, no fever, anemia is at baseline  No lactic acidosis or elevated pro calcitonin  Pt is supra therapeutic on his coumadin today  COVID/FLU/RSV is negative today  Will provide 500 mL NSS for increase to creatinine and need for CT study, eGFR remains baseline in 30s  Pt remains without episodes of hypotension  Pt signed out to Dr Jj Smith as awaiting pt to be evaluated by general surgery for CT findings  Aware of antibiotics in response to CT findings  Amount and/or Complexity of Data Reviewed  Clinical lab tests: reviewed and ordered  Tests in the radiology section of CPT®: ordered and reviewed    Risk of Complications, Morbidity, and/or Mortality  General comments: Pt signed out to Dr Burgess to await final disposition based on general surgery eval      Patient Progress  Patient progress: stable      Disposition  Final diagnoses:   None     ED Disposition     None      Follow-up Information    None         Patient's Medications   Discharge Prescriptions    No medications on file       No discharge procedures on file      PDMP Review     None          ED Provider  Electronically Signed by           ODESSA Hernandez  10/21/22 [address] [place] Dumont  10/21/22 0431

## 2022-10-19 NOTE — PLAN OF CARE
Problem: Potential for Falls  Goal: Patient will remain free of falls  Description: INTERVENTIONS:  - Educate patient/family on patient safety including physical limitations  - Instruct patient to call for assistance with activity   - Consult OT/PT to assist with strengthening/mobility   - Keep Call bell within reach  - Keep bed low and locked with side rails adjusted as appropriate  - Keep care items and personal belongings within reach  - Initiate and maintain comfort rounds  - Make Fall Risk Sign visible to staff  - Offer Toileting every  Hours, in advance of need  - Initiate/Maintain alarm  - Obtain necessary fall risk management equipment:   - Apply yellow socks and bracelet for high fall risk patients  - Consider moving patient to room near nurses station  Outcome: Progressing     Problem: MOBILITY - ADULT  Goal: Maintain or return to baseline ADL function  Description: INTERVENTIONS:  -  Assess patient's ability to carry out ADLs; assess patient's baseline for ADL function and identify physical deficits which impact ability to perform ADLs (bathing, care of mouth/teeth, toileting, grooming, dressing, etc )  - Assess/evaluate cause of self-care deficits   - Assess range of motion  - Assess patient's mobility; develop plan if impaired  - Assess patient's need for assistive devices and provide as appropriate  - Encourage maximum independence but intervene and supervise when necessary  - Involve family in performance of ADLs  - Assess for home care needs following discharge   - Consider OT consult to assist with ADL evaluation and planning for discharge  - Provide patient education as appropriate  Outcome: Progressing  Goal: Maintains/Returns to pre admission functional level  Description: INTERVENTIONS:  - Perform BMAT or MOVE assessment daily    - Set and communicate daily mobility goal to care team and patient/family/caregiver     - Collaborate with rehabilitation services on mobility goals if consulted  - Perform Range of Motion  times a day  - Reposition patient every  hours    - Dangle patient  times a day  - Stand patient  times a day  - Ambulate patient  times a day  - Out of bed to chair  times a day   - Out of bed for meal times a day  - Out of bed for toileting  - Record patient progress and toleration of activity level   Outcome: Progressing     Problem: CARDIOVASCULAR - ADULT  Goal: Maintains optimal cardiac output and hemodynamic stability  Description: INTERVENTIONS:  - Monitor I/O, vital signs and rhythm  - Monitor for S/S and trends of decreased cardiac output  - Administer and titrate ordered vasoactive medications to optimize hemodynamic stability  - Assess quality of pulses, skin color and temperature  - Assess for signs of decreased coronary artery perfusion  - Instruct patient to report change in severity of symptoms  Outcome: Progressing  Goal: Absence of cardiac dysrhythmias or at baseline rhythm  Description: INTERVENTIONS:  - Continuous cardiac monitoring, vital signs, obtain 12 lead EKG if ordered  - Administer antiarrhythmic and heart rate control medications as ordered  - Monitor electrolytes and administer replacement therapy as ordered  Outcome: Progressing     Problem: GASTROINTESTINAL - ADULT  Goal: Minimal or absence of nausea and/or vomiting  Description: INTERVENTIONS:  - Administer IV fluids if ordered to ensure adequate hydration  - Maintain NPO status until nausea and vomiting are resolved  - Nasogastric tube if ordered  - Administer ordered antiemetic medications as needed  - Provide nonpharmacologic comfort measures as appropriate  - Advance diet as tolerated, if ordered  - Consider nutrition services referral to assist patient with adequate nutrition and appropriate food choices  Outcome: Progressing  Goal: Maintains or returns to baseline bowel function  Description: INTERVENTIONS:  - Assess bowel function  - Encourage oral fluids to ensure adequate hydration  - Administer IV fluids if ordered to ensure adequate hydration  - Administer ordered medications as needed  - Encourage mobilization and activity  - Consider nutritional services referral to assist patient with adequate nutrition and appropriate food choices  Outcome: Progressing  Goal: Maintains adequate nutritional intake  Description: INTERVENTIONS:  - Monitor percentage of each meal consumed  - Identify factors contributing to decreased intake, treat as appropriate  - Assist with meals as needed  - Monitor I&O, weight, and lab values if indicated  - Obtain nutrition services referral as needed  Outcome: Progressing  Goal: Establish and maintain optimal ostomy function  Description: INTERVENTIONS:  - Assess bowel function  - Encourage oral fluids to ensure adequate hydration  - Administer IV fluids if ordered to ensure adequate hydration   - Administer ordered medications as needed  - Encourage mobilization and activity  - Nutrition services referral to assist patient with appropriate food choices  - Assess stoma site  - Consider wound care consult   Outcome: Progressing  Goal: Oral mucous membranes remain intact  Description: INTERVENTIONS  - Assess oral mucosa and hygiene practices  - Implement preventative oral hygiene regimen  - Implement oral medicated treatments as ordered  - Initiate Nutrition services referral as needed  Outcome: Progressing     Problem: METABOLIC, FLUID AND ELECTROLYTES - ADULT  Goal: Electrolytes maintained within normal limits  Description: INTERVENTIONS:  - Monitor labs and assess patient for signs and symptoms of electrolyte imbalances  - Administer electrolyte replacement as ordered  - Monitor response to electrolyte replacements, including repeat lab results as appropriate  - Instruct patient on fluid and nutrition as appropriate  Outcome: Progressing  Goal: Fluid balance maintained  Description: INTERVENTIONS:  - Monitor labs   - Monitor I/O and WT  - Instruct patient on fluid and nutrition as appropriate  - Assess for signs & symptoms of volume excess or deficit  Outcome: Progressing  Goal: Glucose maintained within target range  Description: INTERVENTIONS:  - Monitor Blood Glucose as ordered  - Assess for signs and symptoms of hyperglycemia and hypoglycemia  - Administer ordered medications to maintain glucose within target range  - Assess nutritional intake and initiate nutrition service referral as needed  Outcome: Progressing     Problem: MUSCULOSKELETAL - ADULT  Goal: Maintain or return mobility to safest level of function  Description: INTERVENTIONS:  - Assess patient's ability to carry out ADLs; assess patient's baseline for ADL function and identify physical deficits which impact ability to perform ADLs (bathing, care of mouth/teeth, toileting, grooming, dressing, etc )  - Assess/evaluate cause of self-care deficits   - Assess range of motion  - Assess patient's mobility  - Assess patient's need for assistive devices and provide as appropriate  - Encourage maximum independence but intervene and supervise when necessary  - Involve family in performance of ADLs  - Assess for home care needs following discharge   - Consider OT consult to assist with ADL evaluation and planning for discharge  - Provide patient education as appropriate  Outcome: Progressing  Goal: Maintain proper alignment of affected body part  Description: INTERVENTIONS:  - Support, maintain and protect limb and body alignment  - Provide patient/ family with appropriate education  Outcome: Progressing

## 2022-10-19 NOTE — CONSULTS
Interventional Radiology  Consultation 10/19/2022     Consults  Brent Jaquez   1935   [id number]      Assessment/Plan:  81year old male with recent cholecystostomy tube placement  Presented with abdominal pain, discharge from the site and confusion  Will plan for cholecystostomy tube check and exchange       Medical Problems             Problem List     * (Principal) Acute cholecystitis    Facial cellulitis    Dental abscess    Epididymitis    Cellulitis of scrotum    Obesity, unspecified obesity severity, unspecified obesity type    Type 2 diabetes mellitus with diabetic neuropathy, without long-term current use of insulin ([facility])    Lab Results   Component Value Date    HGBA1C 6 3 (H) 03/01/2022       Recent Labs     10/19/22  0101 10/19/22  0544 10/19/22  0739 10/19/22  1154   POCGLU 164* 108 126 137       Blood Sugar Average: Last 72 hrs:  (P) 133 75        Hydrocele    Chronic anticoagulation    Anemia    Paroxysmal atrial fibrillation (HCC)    S/P AVR    PVD (peripheral vascular disease) ([facility])    DVT (deep venous thrombosis) ([facility])    Presence of implantable cardioverter-defibrillator (ICD)    Edema    Venous ulcer of ankle, right ([facility])    Peripheral venous insufficiency    Ventricular tachycardia    Secondary lymphedema    Stage 3a chronic kidney disease with elevated creatinine    Lab Results   Component Value Date    EGFR 30 10/19/2022    EGFR 30 10/18/2022    EGFR 34 09/03/2022    CREATININE 1 93 (H) 10/19/2022    CREATININE 1 92 (H) 10/18/2022    CREATININE 1 76 (H) 31/85/2262         Diastolic congestive heart failure (HCC)    Wt Readings from Last 3 Encounters:   09/26/22 (!) 139 kg (307 lb)   09/20/22 (!) 145 kg (319 lb)   09/03/22 (!) 144 kg (317 lb 10 9 oz)                 OBINNA (obstructive sleep apnea)    Benign prostatic hyperplasia with lower urinary tract symptoms    REMA (acute kidney injury) ([facility] [address] )    Aortic stenosis    Cardiomegaly    Chronic bilateral low back pain without sciatica 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    Compression fracture of L4 lumbar vertebra    Dyslipidemia    Endocarditis    Hematuria, gross    Left knee pain    Mixed hyperlipidemia    Other disorder of calcium metabolism    Osteoarthritis, knee    Renal cyst    Status post right knee replacement    Swelling of limb    Urinary tract infection, acute    BPH (benign prostatic hyperplasia)    Chronic deep vein thrombosis (DVT) (HCC)    Hypomagnesemia                  Subjective:     Patient ID: Lady Metzger is a 81 y o  male  History of Present Illness  81year old male with recent cholecystostomy tube placement  Presented with abdominal pain, discharge from the site and confusion  Will plan for cholecystostomy tube check and exchange         Review of Systems  as above    Past Medical History:   Diagnosis Date   • Anemia    • Arthritis    • Atrial fibrillation ([address] )    • Basal cell carcinoma 03/22/2022    Tip of Nose   • CHF (congestive heart failure) (HCC)    • Diabetes mellitus (HCC)     Niddm   • DVT (deep vein thrombosis) in pregnancy 1966    not in pregnancy   • DVT (deep venous thrombosis) ([address] ) 1966   • Dyslipidemia    • GERD (gastroesophageal reflux disease)    • Hyperlipidemia    • Hypertension    • Irregular heart beat     Afib   • Morbid obesity due to excess calories ([address] )     Resolved 9/2/2014    • Pulmonary embolism (HCC)    • Sepsis ([address] )    • Squamous cell skin cancer 07/30/2020    Left posterior scalp   • Visual impairment         Past Surgical History:   Procedure Laterality Date   • AORTIC VALVE REPLACEMENT     • CARDIAC DEFIBRILLATOR PLACEMENT  04/2014   • CARDIAC SURGERY  02/2014    AVR   • COLONOSCOPY     • INSERT / REPLACE / REMOVE PACEMAKER     • IR CHOLECYSTOSTOMY TUBE CHECK/CHANGE/REPOSITION/REINSERTION/UPSIZE  10/13/2022   • IR CHOLECYSTOSTOMY TUBE PLACEMENT  9/1/2022   • JOINT REPLACEMENT Left     LTKR   • MOHS SURGERY  07/30/2020    Left posterior scalp, Dr Falguni Wolf • MOHS SURGERY  04/18/2022    [facility] Tip of Nose- Dr Alpha Sandhoff   • GA LIGATE/STRIP LONG [address] SEP-FEM JUNC Right 8/17/2018    Procedure: LEG PERFORATED INJECTION SCLEROTHERAPY;  Surgeon: Antolin Hernandez MD;  Location: [facility]  MAIN [facility];  Service: Vascular   • REPLACEMENT TOTAL KNEE Right    • TOTAL KNEE ARTHROPLASTY Left    • VASCULAR SURGERY     • VENA CAVA FILTER PLACEMENT      Interruption inferior vena cava, Josue filter, placement   • WISDOM TOOTH EXTRACTION          Social History     Tobacco Use   Smoking Status Never Smoker   Smokeless Tobacco Never Used        Social History     Substance and Sexual Activity   Alcohol Use Not Currently    Comment: no alcohol in 28 yrs        Social History     Substance and Sexual Activity   Drug Use Never        Allergies   Allergen Reactions   • Tramadol Other (See Comments)     intolerance       Current Facility-Administered Medications   Medication Dose Route Frequency Provider Last Rate Last Admin   • acetaminophen (TYLENOL) tablet 975 mg  975 mg Oral Q8H [address] Ruth Jimenez MD   975 mg at 10/19/22 0556   • albuterol (PROVENTIL HFA,VENTOLIN HFA) inhaler 2 puff  2 puff Inhalation Q4H PRN Ruth Jimenez MD       • atorvastatin (LIPITOR) tablet 40 mg  40 mg Oral After Yarelis Fitch MD       • ceFAZolin (ANCEF) IVPB (premix in dextrose) 2,000 mg 50 mL  2,000 mg Intravenous Q8H Ruth Jimenez  mL/hr at 10/19/22 1433 2,000 mg at 10/19/22 1433   • HYDROmorphone (DILAUDID) injection 0 5 mg  0 5 mg Intravenous Q3H PRN Yao Morris PA-C       • HYDROmorphone HCl (DILAUDID) injection 0 2 mg  0 2 mg Intravenous Q1H PRN Yao Morris PA-C       • HYDROmorphone HCl (DILAUDID) injection 0 2 mg  0 2 mg Intravenous Q3H PRN Yao Morris PA-C       • influenza vaccine, high-dose (FLUZONE HIGH-DOSE) IM injection RAMON 0 7 mL  0 7 mL Intramuscular Prior to discharge Amrik Ventura DO       • insulin lispro (HumaLOG) 100 units/mL subcutaneous injection 4-20 Units  4-20 Units Subcutaneous Q6H [address] Lynette Virk MD   4 Units at 10/19/22 0112   • lactated ringers infusion  75 mL/hr Intravenous Continuous Lynette Virk MD 75 mL/hr at 10/19/22 0029 75 mL/hr at 10/19/22 0029   • metroNIDAZOLE (FLAGYL) IVPB (premix) 500 mg 100 mL  500 mg Intravenous Q8H Lynette Virk  mL/hr at 10/19/22 1531 500 mg at 10/19/22 1531   • [START ON 10/20/2022] pantoprazole (PROTONIX) EC tablet 40 mg  40 mg Oral Early Morning Maxine Roman MD       • pneumococcal 23-valent polysaccharide vaccine (PNEUMOVAX-23) injection 0 5 mL  0 5 mL Subcutaneous Prior to discharge Marcus Jorge Ventura DO       • scopolamine (TRANSDERM-SCOP) 1 mg/3 days TD 72 hr patch 1 patch  1 patch Transdermal Q72H PRN Lynette Virk MD       • sotalol (BETAPACE) tablet 80 mg  80 mg Oral Daily Teja Victor MD              Objective:    Vitals:    10/19/22 0315 10/19/22 0724 10/19/22 1323 10/19/22 1641   BP: 101/55 109/82 144/61 143/79   BP Location: Left arm   Right arm   Pulse: 73 61 (!) 54 68   Resp: 18   18   Temp:  97 8 °F (36 6 °C)  98 °F (36 7 °C)   TempSrc:  Oral  Oral   SpO2: 95% 96% 99% 97%        Physical Exam  Not performed    Lab Results   Component Value Date     (H) 08/29/2022      Lab Results   Component Value Date    WBC 7 85 10/19/2022    HGB 9 2 (L) 10/19/2022    HCT 29 6 (L) 10/19/2022    MCV 87 10/19/2022     10/19/2022     Lab Results   Component Value Date    INR 4 36 (H) 10/19/2022    INR 3 95 (H) 10/18/2022    INR 1 92 (H) 09/03/2022    PROTIME 41 9 (H) 10/19/2022    PROTIME 38 9 (H) 10/18/2022    PROTIME 22 2 (H) 09/03/2022     Lab Results   Component Value Date    PTT 67 (H) 10/18/2022         I have personally reviewed pertinent imaging and laboratory results       Code Status: Prior  Advance Directive and Living Will:      Power of :    POLST:      IR has been consulted to evaluate the patient, determine the appropriate procedure, and whether or not a procedure can and should be performed  Thank you for allowing me to participate in the care of Kishan Connelly  Please don't hesitate to call, text, email, or TigerText with any questions  This text is generated with voice recognition software  There may be translation, syntax,  or grammatical errors  If you have any questions, please contact the dictating provider

## 2022-10-19 NOTE — ASSESSMENT & PLAN NOTE
Holding warfarin due to planned IR procedure  Continue sotalol, will decrease dose adjust to 80 mg once daily due to renal function

## 2022-10-20 LAB
ABO GROUP BLD: NORMAL
ALBUMIN SERPL BCP-MCNC: 3.2 G/DL (ref 3.5–5)
ALP SERPL-CCNC: 204 U/L (ref 34–104)
ALT SERPL W P-5'-P-CCNC: 94 U/L (ref 7–52)
ANION GAP SERPL CALCULATED.3IONS-SCNC: 10 MMOL/L (ref 4–13)
AST SERPL W P-5'-P-CCNC: 221 U/L (ref 13–39)
BASOPHILS # BLD AUTO: 0.02 THOUSANDS/ÂΜL (ref 0–0.1)
BASOPHILS NFR BLD AUTO: 0 % (ref 0–1)
BILIRUB SERPL-MCNC: 2.36 MG/DL (ref 0.2–1)
BLD GP AB SCN SERPL QL: NEGATIVE
BUN SERPL-MCNC: 47 MG/DL (ref 5–25)
CALCIUM ALBUM COR SERPL-MCNC: 9.6 MG/DL (ref 8.3–10.1)
CALCIUM SERPL-MCNC: 9 MG/DL (ref 8.4–10.2)
CHLORIDE SERPL-SCNC: 104 MMOL/L (ref 96–108)
CO2 SERPL-SCNC: 27 MMOL/L (ref 21–32)
CREAT SERPL-MCNC: 1.8 MG/DL (ref 0.6–1.3)
EOSINOPHIL # BLD AUTO: 0.17 THOUSAND/ÂΜL (ref 0–0.61)
EOSINOPHIL NFR BLD AUTO: 3 % (ref 0–6)
ERYTHROCYTE [DISTWIDTH] IN BLOOD BY AUTOMATED COUNT: 17.4 % (ref 11.6–15.1)
GFR SERPL CREATININE-BSD FRML MDRD: 33 ML/MIN/1.73SQ M
GLUCOSE SERPL-MCNC: 115 MG/DL (ref 65–140)
GLUCOSE SERPL-MCNC: 122 MG/DL (ref 65–140)
GLUCOSE SERPL-MCNC: 137 MG/DL (ref 65–140)
GLUCOSE SERPL-MCNC: 144 MG/DL (ref 65–140)
GLUCOSE SERPL-MCNC: 166 MG/DL (ref 65–140)
HCT VFR BLD AUTO: 28.5 % (ref 36.5–49.3)
HGB BLD-MCNC: 8.9 G/DL (ref 12–17)
IMM GRANULOCYTES # BLD AUTO: 0.03 THOUSAND/UL (ref 0–0.2)
IMM GRANULOCYTES NFR BLD AUTO: 1 % (ref 0–2)
INR PPP: 5.51 (ref 0.84–1.19)
INR PPP: 7.32 (ref 0.84–1.19)
LYMPHOCYTES # BLD AUTO: 1.02 THOUSANDS/ÂΜL (ref 0.6–4.47)
LYMPHOCYTES NFR BLD AUTO: 17 % (ref 14–44)
MCH RBC QN AUTO: 26.5 PG (ref 26.8–34.3)
MCHC RBC AUTO-ENTMCNC: 31.2 G/DL (ref 31.4–37.4)
MCV RBC AUTO: 85 FL (ref 82–98)
MONOCYTES # BLD AUTO: 0.56 THOUSAND/ÂΜL (ref 0.17–1.22)
MONOCYTES NFR BLD AUTO: 9 % (ref 4–12)
NEUTROPHILS # BLD AUTO: 4.28 THOUSANDS/ÂΜL (ref 1.85–7.62)
NEUTS SEG NFR BLD AUTO: 70 % (ref 43–75)
NRBC BLD AUTO-RTO: 0 /100 WBCS
PHOSPHATE SERPL-MCNC: 5 MG/DL (ref 2.3–4.1)
PLATELET # BLD AUTO: 246 THOUSANDS/UL (ref 149–390)
PMV BLD AUTO: 10 FL (ref 8.9–12.7)
POTASSIUM SERPL-SCNC: 3.5 MMOL/L (ref 3.5–5.3)
PROT SERPL-MCNC: 6.5 G/DL (ref 6.4–8.4)
PROTHROMBIN TIME: 50.3 SECONDS (ref 11.6–14.5)
PROTHROMBIN TIME: 62.7 SECONDS (ref 11.6–14.5)
RBC # BLD AUTO: 3.36 MILLION/UL (ref 3.88–5.62)
RH BLD: NEGATIVE
SODIUM SERPL-SCNC: 141 MMOL/L (ref 135–147)
SPECIMEN EXPIRATION DATE: NORMAL
WBC # BLD AUTO: 6.08 THOUSAND/UL (ref 4.31–10.16)

## 2022-10-20 PROCEDURE — 85025 COMPLETE CBC W/AUTO DIFF WBC: CPT | Performed by: PHYSICIAN ASSISTANT

## 2022-10-20 PROCEDURE — 86850 RBC ANTIBODY SCREEN: CPT | Performed by: PHYSICIAN ASSISTANT

## 2022-10-20 PROCEDURE — 80053 COMPREHEN METABOLIC PANEL: CPT | Performed by: PHYSICIAN ASSISTANT

## 2022-10-20 PROCEDURE — 0F24X0Z CHANGE DRAINAGE DEVICE IN GALLBLADDER, EXTERNAL APPROACH: ICD-10-PCS | Performed by: SURGERY

## 2022-10-20 PROCEDURE — 86900 BLOOD TYPING SEROLOGIC ABO: CPT | Performed by: PHYSICIAN ASSISTANT

## 2022-10-20 PROCEDURE — 99232 SBSQ HOSP IP/OBS MODERATE 35: CPT | Performed by: SURGERY

## 2022-10-20 PROCEDURE — 85610 PROTHROMBIN TIME: CPT | Performed by: PHYSICIAN ASSISTANT

## 2022-10-20 PROCEDURE — 99232 SBSQ HOSP IP/OBS MODERATE 35: CPT | Performed by: INTERNAL MEDICINE

## 2022-10-20 PROCEDURE — P9017 PLASMA 1 DONOR FRZ W/IN 8 HR: HCPCS

## 2022-10-20 PROCEDURE — 84100 ASSAY OF PHOSPHORUS: CPT | Performed by: PHYSICIAN ASSISTANT

## 2022-10-20 PROCEDURE — 86901 BLOOD TYPING SEROLOGIC RH(D): CPT | Performed by: PHYSICIAN ASSISTANT

## 2022-10-20 PROCEDURE — 82948 REAGENT STRIP/BLOOD GLUCOSE: CPT

## 2022-10-20 PROCEDURE — 93005 ELECTROCARDIOGRAM TRACING: CPT

## 2022-10-20 RX ORDER — SODIUM CHLORIDE, SODIUM LACTATE, POTASSIUM CHLORIDE, CALCIUM CHLORIDE 600; 310; 30; 20 MG/100ML; MG/100ML; MG/100ML; MG/100ML
50 INJECTION, SOLUTION INTRAVENOUS CONTINUOUS
Status: DISCONTINUED | OUTPATIENT
Start: 2022-10-20 | End: 2022-10-20

## 2022-10-20 RX ORDER — DEXTROSE, SODIUM CHLORIDE, AND POTASSIUM CHLORIDE 5; .45; .15 G/100ML; G/100ML; G/100ML
50 INJECTION INTRAVENOUS CONTINUOUS
Status: DISCONTINUED | OUTPATIENT
Start: 2022-10-20 | End: 2022-10-21

## 2022-10-20 RX ORDER — OXYCODONE HYDROCHLORIDE 5 MG/1
2.5 TABLET ORAL EVERY 6 HOURS PRN
Status: DISCONTINUED | OUTPATIENT
Start: 2022-10-20 | End: 2022-10-24 | Stop reason: HOSPADM

## 2022-10-20 RX ORDER — OXYCODONE HYDROCHLORIDE 5 MG/1
5 TABLET ORAL EVERY 6 HOURS PRN
Status: DISCONTINUED | OUTPATIENT
Start: 2022-10-20 | End: 2022-10-24 | Stop reason: HOSPADM

## 2022-10-20 RX ORDER — ONDANSETRON 2 MG/ML
4 INJECTION INTRAMUSCULAR; INTRAVENOUS EVERY 4 HOURS PRN
Status: DISCONTINUED | OUTPATIENT
Start: 2022-10-20 | End: 2022-10-20

## 2022-10-20 RX ORDER — HYDROMORPHONE HCL IN WATER/PF 6 MG/30 ML
0.2 PATIENT CONTROLLED ANALGESIA SYRINGE INTRAVENOUS
Status: DISCONTINUED | OUTPATIENT
Start: 2022-10-20 | End: 2022-10-24 | Stop reason: HOSPADM

## 2022-10-20 RX ADMIN — METRONIDAZOLE 500 MG: 500 INJECTION, SOLUTION INTRAVENOUS at 23:10

## 2022-10-20 RX ADMIN — PANTOPRAZOLE SODIUM 40 MG: 40 TABLET, DELAYED RELEASE ORAL at 05:28

## 2022-10-20 RX ADMIN — ACETAMINOPHEN 975 MG: 325 TABLET, FILM COATED ORAL at 21:49

## 2022-10-20 RX ADMIN — METRONIDAZOLE 500 MG: 500 INJECTION, SOLUTION INTRAVENOUS at 08:28

## 2022-10-20 RX ADMIN — DEXTROSE, SODIUM CHLORIDE, AND POTASSIUM CHLORIDE 50 ML/HR: 5; .45; .15 INJECTION INTRAVENOUS at 17:42

## 2022-10-20 RX ADMIN — CEFAZOLIN SODIUM 2000 MG: 2 SOLUTION INTRAVENOUS at 07:36

## 2022-10-20 RX ADMIN — SODIUM CHLORIDE, SODIUM LACTATE, POTASSIUM CHLORIDE, AND CALCIUM CHLORIDE 50 ML/HR: .6; .31; .03; .02 INJECTION, SOLUTION INTRAVENOUS at 09:45

## 2022-10-20 RX ADMIN — ACETAMINOPHEN 975 MG: 325 TABLET, FILM COATED ORAL at 05:28

## 2022-10-20 RX ADMIN — SODIUM CHLORIDE, SODIUM LACTATE, POTASSIUM CHLORIDE, AND CALCIUM CHLORIDE 75 ML/HR: .6; .31; .03; .02 INJECTION, SOLUTION INTRAVENOUS at 07:36

## 2022-10-20 RX ADMIN — ATORVASTATIN CALCIUM 40 MG: 40 TABLET, FILM COATED ORAL at 17:55

## 2022-10-20 RX ADMIN — METRONIDAZOLE 500 MG: 500 INJECTION, SOLUTION INTRAVENOUS at 16:18

## 2022-10-20 RX ADMIN — INSULIN LISPRO 4 UNITS: 100 INJECTION, SOLUTION INTRAVENOUS; SUBCUTANEOUS at 17:56

## 2022-10-20 RX ADMIN — CEFAZOLIN SODIUM 2000 MG: 2 SOLUTION INTRAVENOUS at 15:31

## 2022-10-20 RX ADMIN — SOTALOL HYDROCHLORIDE 80 MG: 80 TABLET ORAL at 11:29

## 2022-10-20 RX ADMIN — ACETAMINOPHEN 975 MG: 325 TABLET, FILM COATED ORAL at 16:31

## 2022-10-20 NOTE — PROGRESS NOTES
[facility]  Progress Note - Lady Metzger 1935, 81 y o  male MRN: [id number]  Unit/Bed#: W -01 Encounter: [id number]  Primary Care Provider: Mariola Li DO   Date and time admitted to hospital: 10/18/2022  5:38 PM    * Acute cholecystitis  Assessment & Plan  Plan:  · IR tube exchanged yesterday and with bloody discharge today  · INR remains elevated today, surgery ordered FFP 2 units  S/p 2 5 mg of vitamin K yesterday  · Continue Ancef and Flagyl per primary team she  · Goal INR 2-3    Hypomagnesemia  Assessment & Plan  Currently 1 8, goal greater than/equal to 2  Will replete    Chronic deep vein thrombosis (DVT) (HCC)  Assessment & Plan  History lower extremity DVT in 2015  Goal INR 2-3    OBINNA (obstructive sleep apnea)  Assessment & Plan  Continue CPAP    Diastolic congestive heart failure [facility])  Assessment & Plan  Wt Readings from Last 3 Encounters:   09/26/22 (!) 139 kg (307 lb)   09/20/22 (!) 145 kg (319 lb)   09/03/22 (!) 144 kg (317 lb 10 9 oz)       Without shortness of breath or symptoms suggestive of failure  Stage 3a chronic kidney disease with elevated creatinine  Assessment & Plan  Lab Results   Component Value Date    EGFR 30 10/19/2022    EGFR 30 10/18/2022    EGFR 34 09/03/2022    CREATININE 1 93 (H) 10/19/2022    CREATININE 1 92 (H) 10/18/2022    CREATININE 1 76 (H) 09/03/2022     Appears close to baseline of 1 6-1 9  Continue to trend daily BMP  Torsemide continued    Paroxysmal atrial fibrillation (HCC)  Assessment & Plan  Warfarin held due to supratherapeutic INR  Plasma given today    Continue sotalol at reduced dose of 80 mg due to renal function    Anemia  Assessment & Plan  Appears chronic without signs of bleeding outside of the recent tube exchange    Type 2 diabetes mellitus with diabetic neuropathy, without long-term current use of insulin [facility])  Assessment & Plan  Lab Results   Component Value Date    HGBA1C 6 3 (H) 03/01/2022 Recent Labs     10/19/22  0101 10/19/22  0544 10/19/22  0739 10/19/22  1154   POCGLU 164* 108 126 137       Plan:  Continue insulin sliding scale with hypoglycemia protocol  Carb controlled diet when eating        VTE Pharmacologic Prophylaxis: VTE Score: 7 High Risk (Score >/= 5) - Pharmacological DVT Prophylaxis Ordered: warfarin (Coumadin)  Sequential Compression Devices Ordered  Patient Centered Rounds: I evaluated the patient without nursing staff present due to Unavailable  Discussions with Specialists or Other Care Team Provider:  Discussed with primary team, surgery AP    Education and Discussions with Family / Patient: Patient declined call to   Time Spent for Care: 30 minutes  More than 50% of total time spent on counseling and coordination of care as described above  Current Length of Stay: 2 day(s)  Current Patient Status: Inpatient   Certification Statement: The patient will continue to require additional inpatient hospital stay due to IV antibiotics and monitoring of recent tube exchange good  Discharge Plan: [facility] is following this patient on consult  They are not yet medically stable for discharge secondary to Elevated INR, sepsis control  Code Status: Prior    Subjective:   He is feeling much better since yesterday  Although he admits the procedure was painful than he had expected he feels it has improved today  He denies any fevers or chills  He does not feel constipated though he has not had a bowel movement since being admitted which he attributes to not eating as much  He denies any shortness of breath or chest pain  He denies any abdominal pain  He denies any bright red blood per rectum or hematuria      Objective:     Vitals:   Temp (24hrs), Av 1 °F (36 7 °C), Min:97 7 °F (36 5 °C), Max:98 9 °F (37 2 °C)    Temp:  [97 7 °F (36 5 °C)-98 9 °F (37 2 °C)] 97 8 °F (36 6 °C)  HR:  [54-68] 59  Resp:  [16-19] 16  BP: (114-144)/(61-79) 124/61  SpO2:  [92 %-99 %] 99 %  Body mass index is 40 72 kg/m²  Input and Output Summary (last 24 hours): Intake/Output Summary (Last 24 hours) at 10/20/2022 1128  Last data filed at 10/20/2022 0736  Gross per 24 hour   Intake 1857 5 ml   Output 215 ml   Net 1642 5 ml       Physical Exam:   Physical Exam  Vitals and nursing note reviewed  Constitutional:       Appearance: Normal appearance  He is obese  He is not ill-appearing or diaphoretic  HENT:      Head: Normocephalic  Nose: Nose normal  No congestion  Mouth/Throat:      Mouth: Mucous membranes are moist       Pharynx: No oropharyngeal exudate  Eyes:      General: No scleral icterus  Conjunctiva/sclera: Conjunctivae normal    Pulmonary:      Effort: Pulmonary effort is normal  No respiratory distress  Breath sounds: No wheezing or rales  Abdominal:      General: Bowel sounds are normal  There is no distension  Palpations: Abdomen is soft  Tenderness: There is no abdominal tenderness  Comments: Percutaneous cholecystostomy tube present with bloody discharge in right upper quadrant   Musculoskeletal:      Cervical back: Neck supple  No rigidity  Skin:     General: Skin is warm  Neurological:      Mental Status: He is alert     Psychiatric:         Mood and Affect: Mood normal          Behavior: Behavior normal           Additional Data:     Labs:  Results from last 7 days   Lab Units 10/20/22  0449   WBC Thousand/uL 6 08   HEMOGLOBIN g/dL 8 9*   HEMATOCRIT % 28 5*   PLATELETS Thousands/uL 246   NEUTROS PCT % 70   LYMPHS PCT % 17   MONOS PCT % 9   EOS PCT % 3     Results from last 7 days   Lab Units 10/20/22  0449   SODIUM mmol/L 141   POTASSIUM mmol/L 3 5   CHLORIDE mmol/L 104   CO2 mmol/L 27   BUN mg/dL 47*   CREATININE mg/dL 1 80*   ANION GAP mmol/L 10   CALCIUM mg/dL 9 0   ALBUMIN g/dL 3 2*   TOTAL BILIRUBIN mg/dL 2 36*   ALK PHOS U/L 204*   ALT U/L 94*   AST U/L 221*   GLUCOSE RANDOM mg/dL 115     Results from last 7 days   Lab Units 10/20/22  0449   INR  7 32*     Results from last 7 days   Lab Units 10/20/22  0454 10/19/22  2356 10/19/22  2203 10/19/22  1803 10/19/22  1154 10/19/22  0739 10/19/22  0544 10/19/22  0101   POC GLUCOSE mg/dl 122 144* 152* 131 137 126 108 164*         Results from last 7 days   Lab Units 10/18/22  1847   LACTIC ACID mmol/L 1 2   PROCALCITONIN ng/ml 0 10       Lines/Drains:  Invasive Devices  Report    Peripheral Intravenous Line  Duration           Peripheral IV 10/18/22 Right Antecubital 1 day    Peripheral IV 10/19/22 Left Antecubital 1 day          Drain  Duration           Cholecystostomy Tube RUQ <1 day                      Imaging: Personally reviewed the following imaging: procedure reports    Recent Cultures (last 7 days):   Results from last 7 days   Lab Units 10/18/22  1926 10/18/22  1847   BLOOD CULTURE  No Growth at 24 hrs  No Growth at 24 hrs         Last 24 Hours Medication List:   Current Facility-Administered Medications   Medication Dose Route Frequency Provider Last Rate   • acetaminophen  975 mg Oral [facility] Maria Luisa Perera MD     • albuterol  2 puff Inhalation Q4H PRN Maria Luisa Perera MD     • atorvastatin  40 mg Oral After Missy Daniels MD     • cefazolin  2,000 mg Intravenous Q8H Maria Luisa Perera MD 2,000 mg (10/20/22 0736)   • HYDROmorphone  0 5 mg Intravenous Q3H PRN Maureen Goode PA-C     • HYDROmorphone  0 2 mg Intravenous Q1H PRN Maureen Goode PA-C     • HYDROmorphone  0 2 mg Intravenous Q3H PRN Maureen Goode PA-C     • influenza vaccine  0 7 mL Intramuscular Prior to discharge Angelica Ventura DO     • insulin lispro  4-20 Units Subcutaneous Q6H [address] Maria Luisa Perera MD     • lactated ringers  50 mL/hr Intravenous Continuous Yimi Morrow MD     • metroNIDAZOLE  500 mg Intravenous Q8H Maria Luisa Perera  mg (10/20/22 1074)   • pantoprazole  40 mg Oral Early Morning Maxine Nevarez MD     • pneumococcal 23-valent polysaccharide vaccine  0 5 mL Subcutaneous Prior to discharge Justen Ventura DO     • scopolamine  1 patch Transdermal Q72H PRN Sarina Caldwell MD     • sotalol  80 mg Oral Daily Shaun Velia Lefort, MD          Today, Patient Was Seen By: Elizabeth Rucker    **Please Note: This note may have been constructed using a voice recognition system  **

## 2022-10-20 NOTE — PROGRESS NOTES
Progress Note - General Surgery  Angélica Police 81 y o  male MRN: [id number]  Unit/Bed#: W -01 Encounter: [id number]      Assessment:  81 y o  male with cholecystitis s/p perc cholecystostomy tube placed on 09/01  CT scan suggested potential obstruction of the drainage tube  Perc cholecystostomy tube replaced by IR on 10/19  Patient course complicated by elevated INR  Drain:  40cc bilious  Hgb: 8 9 from 9 2  T Bili: 2 36 from 1 5    Plan:  - CLD  - PRN antiemetics and analgesia  - Holding warfarin and DVT ppx given INR of 7 3, will transfuse two units of FFP and recheck INR   - Trend T Bili        Subjective: No acute events overnight  Afebrile, hemodynamically stable  Tolerating diet  No nausea, or vomiting, fevers or chills  Patient states his pain is significantly improved since drain placement  Objective:   Temp:  [97 7 °F (36 5 °C)-98 9 °F (37 2 °C)] 97 7 °F (36 5 °C)  HR:  [54-68] 62  Resp:  [17-19] 17  BP: (114-144)/(61-79) 130/68    Physical Exam:  General: No acute distress, alert and oriented  CV: Well perfused, regular rate and rhythm  Lungs: Normal work of breathing, no increased respiratory effort  Abdomen: Soft, non-tender, non-distended  Incision(s) clean, dry and intact  Extremities: No edema, clubbing or cyanosis  Skin: Warm, dry      I/O       10/18 0701  10/19 0700 10/19 0701  10/20 0700 10/20 0701  10/21 0700    P  O   0     I V  (mL/kg)  1000 (7 1) 857 5 (6 1)    IV Piggyback 750      Total Intake(mL/kg) 750 1000 (7 1) 857 5 (6 1)    Urine (mL/kg/hr)  175 (0 1)     Stool  40     Total Output  215     Net +750 +785 +857 5           Unmeasured Urine Occurrence  1 x     Unmeasured Stool Occurrence  0 x           Lab, Imaging and other studies: I have personally reviewed pertinent reports    , CBC with diff:   Lab Results   Component Value Date    WBC 6 08 10/20/2022    HGB 8 9 (L) 10/20/2022    HCT 28 5 (L) 10/20/2022    MCV 85 10/20/2022     10/20/2022    MCH 26 5 (L) 10/20/2022    MCHC 31 2 (L) 10/20/2022    RDW 17 4 (H) 10/20/2022    MPV 10 0 10/20/2022    NRBC 0 10/20/2022   , BMP/CMP:   Lab Results   Component Value Date    SODIUM 141 10/20/2022    K 3 5 10/20/2022     10/20/2022    CO2 27 10/20/2022    BUN 47 (H) 10/20/2022    CREATININE 1 80 (H) 10/20/2022    CALCIUM 9 0 10/20/2022     (H) 10/20/2022    ALT 94 (H) 10/20/2022    ALKPHOS 204 (H) 10/20/2022    EGFR 33 10/20/2022         [address]  10/20/2022 8:47 AM

## 2022-10-20 NOTE — PLAN OF CARE
Problem: Potential for Falls  Goal: Patient will remain free of falls  Description: INTERVENTIONS:  - Educate patient/family on patient safety including physical limitations  - Instruct patient to call for assistance with activity   - Consult OT/PT to assist with strengthening/mobility   - Keep Call bell within reach  - Keep bed low and locked with side rails adjusted as appropriate  - Keep care items and personal belongings within reach  - Initiate and maintain comfort rounds  - Make Fall Risk Sign visible to staff  - Offer Toileting every  Hours, in advance of need  - Initiate/Maintain alarm  - Obtain necessary fall risk management equipment  - Apply yellow socks and bracelet for high fall risk patients  - Consider moving patient to room near nurses station  Outcome: Progressing     Problem: MOBILITY - ADULT  Goal: Maintain or return to baseline ADL function  Description: INTERVENTIONS:  -  Assess patient's ability to carry out ADLs; assess patient's baseline for ADL function and identify physical deficits which impact ability to perform ADLs (bathing, care of mouth/teeth, toileting, grooming, dressing, etc )  - Assess/evaluate cause of self-care deficits   - Assess range of motion  - Assess patient's mobility; develop plan if impaired  - Assess patient's need for assistive devices and provide as appropriate  - Encourage maximum independence but intervene and supervise when necessary  - Involve family in performance of ADLs  - Assess for home care needs following discharge   - Consider OT consult to assist with ADL evaluation and planning for discharge  - Provide patient education as appropriate  Outcome: Progressing  Goal: Maintains/Returns to pre admission functional level  Description: INTERVENTIONS:  - Perform BMAT or MOVE assessment daily    - Set and communicate daily mobility goal to care team and patient/family/caregiver     - Collaborate with rehabilitation services on mobility goals if consulted  - Perform Range of Motion  times a day  - Reposition patient every  hours    - Dangle patient  times a day  - Stand patient  times a day  - Ambulate patient  times a day  - Out of bed to chair  times a day   - Out of bed for meals  times a day  - Out of bed for toileting  - Record patient progress and toleration of activity level   Outcome: Progressing     Problem: CARDIOVASCULAR - ADULT  Goal: Maintains optimal cardiac output and hemodynamic stability  Description: INTERVENTIONS:  - Monitor I/O, vital signs and rhythm  - Monitor for S/S and trends of decreased cardiac output  - Administer and titrate ordered vasoactive medications to optimize hemodynamic stability  - Assess quality of pulses, skin color and temperature  - Assess for signs of decreased coronary artery perfusion  - Instruct patient to report change in severity of symptoms  Outcome: Progressing  Goal: Absence of cardiac dysrhythmias or at baseline rhythm  Description: INTERVENTIONS:  - Continuous cardiac monitoring, vital signs, obtain 12 lead EKG if ordered  - Administer antiarrhythmic and heart rate control medications as ordered  - Monitor electrolytes and administer replacement therapy as ordered  Outcome: Progressing     Problem: GASTROINTESTINAL - ADULT  Goal: Minimal or absence of nausea and/or vomiting  Description: INTERVENTIONS:  - Administer IV fluids if ordered to ensure adequate hydration  - Maintain NPO status until nausea and vomiting are resolved  - Nasogastric tube if ordered  - Administer ordered antiemetic medications as needed  - Provide nonpharmacologic comfort measures as appropriate  - Advance diet as tolerated, if ordered  - Consider nutrition services referral to assist patient with adequate nutrition and appropriate food choices  Outcome: Progressing  Goal: Maintains or returns to baseline bowel function  Description: INTERVENTIONS:  - Assess bowel function  - Encourage oral fluids to ensure adequate hydration  - Administer IV fluids if ordered to ensure adequate hydration  - Administer ordered medications as needed  - Encourage mobilization and activity  - Consider nutritional services referral to assist patient with adequate nutrition and appropriate food choices  Outcome: Progressing  Goal: Maintains adequate nutritional intake  Description: INTERVENTIONS:  - Monitor percentage of each meal consumed  - Identify factors contributing to decreased intake, treat as appropriate  - Assist with meals as needed  - Monitor I&O, weight, and lab values if indicated  - Obtain nutrition services referral as needed  Outcome: Progressing  Goal: Establish and maintain optimal ostomy function  Description: INTERVENTIONS:  - Assess bowel function  - Encourage oral fluids to ensure adequate hydration  - Administer IV fluids if ordered to ensure adequate hydration   - Administer ordered medications as needed  - Encourage mobilization and activity  - Nutrition services referral to assist patient with appropriate food choices  - Assess stoma site  - Consider wound care consult   Outcome: Progressing  Goal: Oral mucous membranes remain intact  Description: INTERVENTIONS  - Assess oral mucosa and hygiene practices  - Implement preventative oral hygiene regimen  - Implement oral medicated treatments as ordered  - Initiate Nutrition services referral as needed  Outcome: Progressing     Problem: METABOLIC, FLUID AND ELECTROLYTES - ADULT  Goal: Electrolytes maintained within normal limits  Description: INTERVENTIONS:  - Monitor labs and assess patient for signs and symptoms of electrolyte imbalances  - Administer electrolyte replacement as ordered  - Monitor response to electrolyte replacements, including repeat lab results as appropriate  - Instruct patient on fluid and nutrition as appropriate  Outcome: Progressing  Goal: Fluid balance maintained  Description: INTERVENTIONS:  - Monitor labs   - Monitor I/O and WT  - Instruct patient on fluid and nutrition as appropriate  - Assess for signs & symptoms of volume excess or deficit  Outcome: Progressing  Goal: Glucose maintained within target range  Description: INTERVENTIONS:  - Monitor Blood Glucose as ordered  - Assess for signs and symptoms of hyperglycemia and hypoglycemia  - Administer ordered medications to maintain glucose within target range  - Assess nutritional intake and initiate nutrition service referral as needed  Outcome: Progressing     Problem: MUSCULOSKELETAL - ADULT  Goal: Maintain or return mobility to safest level of function  Description: INTERVENTIONS:  - Assess patient's ability to carry out ADLs; assess patient's baseline for ADL function and identify physical deficits which impact ability to perform ADLs (bathing, care of mouth/teeth, toileting, grooming, dressing, etc )  - Assess/evaluate cause of self-care deficits   - Assess range of motion  - Assess patient's mobility  - Assess patient's need for assistive devices and provide as appropriate  - Encourage maximum independence but intervene and supervise when necessary  - Involve family in performance of ADLs  - Assess for home care needs following discharge   - Consider OT consult to assist with ADL evaluation and planning for discharge  - Provide patient education as appropriate  Outcome: Progressing  Goal: Maintain proper alignment of affected body part  Description: INTERVENTIONS:  - Support, maintain and protect limb and body alignment  - Provide patient/ family with appropriate education  Outcome: Progressing     Problem: Nutrition/Hydration-ADULT  Goal: Nutrient/Hydration intake appropriate for improving, restoring or maintaining nutritional needs  Description: Monitor and assess patient's nutrition/hydration status for malnutrition  Collaborate with interdisciplinary team and initiate plan and interventions as ordered  Monitor patient's weight and dietary intake as ordered or per policy   Utilize nutrition screening tool and intervene as necessary  Determine patient's food preferences and provide high-protein, high-caloric foods as appropriate       INTERVENTIONS:  - Monitor oral intake, urinary output, labs, and treatment plans  - Assess nutrition and hydration status and recommend course of action  - Evaluate amount of meals eaten  - Assist patient with eating if necessary   - Allow adequate time for meals  - Recommend/ encourage appropriate diets, oral nutritional supplements, and vitamin/mineral supplements  - Order, calculate, and assess calorie counts as needed  - Recommend, monitor, and adjust tube feedings and TPN/PPN based on assessed needs  - Assess need for intravenous fluids  - Provide specific nutrition/hydration education as appropriate  - Include patient/family/caregiver in decisions related to nutrition  Outcome: Progressing

## 2022-10-21 LAB
ABO GROUP BLD BPU: NORMAL
ALBUMIN SERPL BCP-MCNC: 3.3 G/DL (ref 3.5–5)
ALP SERPL-CCNC: 176 U/L (ref 34–104)
ALT SERPL W P-5'-P-CCNC: 15 U/L (ref 7–52)
ANION GAP SERPL CALCULATED.3IONS-SCNC: 13 MMOL/L (ref 4–13)
AST SERPL W P-5'-P-CCNC: 112 U/L (ref 13–39)
ATRIAL RATE: 100 BPM
BASOPHILS # BLD AUTO: 0.02 THOUSANDS/ÂΜL (ref 0–0.1)
BASOPHILS NFR BLD AUTO: 0 % (ref 0–1)
BILIRUB SERPL-MCNC: 1.38 MG/DL (ref 0.2–1)
BPU ID: NORMAL
BUN SERPL-MCNC: 46 MG/DL (ref 5–25)
CALCIUM ALBUM COR SERPL-MCNC: 9.5 MG/DL (ref 8.3–10.1)
CALCIUM SERPL-MCNC: 8.9 MG/DL (ref 8.4–10.2)
CHLORIDE SERPL-SCNC: 103 MMOL/L (ref 96–108)
CO2 SERPL-SCNC: 25 MMOL/L (ref 21–32)
CREAT SERPL-MCNC: 2.47 MG/DL (ref 0.6–1.3)
EOSINOPHIL # BLD AUTO: 0.28 THOUSAND/ÂΜL (ref 0–0.61)
EOSINOPHIL NFR BLD AUTO: 5 % (ref 0–6)
ERYTHROCYTE [DISTWIDTH] IN BLOOD BY AUTOMATED COUNT: 17.6 % (ref 11.6–15.1)
GFR SERPL CREATININE-BSD FRML MDRD: 22 ML/MIN/1.73SQ M
GLUCOSE SERPL-MCNC: 114 MG/DL (ref 65–140)
GLUCOSE SERPL-MCNC: 129 MG/DL (ref 65–140)
GLUCOSE SERPL-MCNC: 134 MG/DL (ref 65–140)
GLUCOSE SERPL-MCNC: 135 MG/DL (ref 65–140)
GLUCOSE SERPL-MCNC: 147 MG/DL (ref 65–140)
GLUCOSE SERPL-MCNC: 161 MG/DL (ref 65–140)
GLUCOSE SERPL-MCNC: 165 MG/DL (ref 65–140)
HCT VFR BLD AUTO: 27.2 % (ref 36.5–49.3)
HGB BLD-MCNC: 8.5 G/DL (ref 12–17)
IMM GRANULOCYTES # BLD AUTO: 0.03 THOUSAND/UL (ref 0–0.2)
IMM GRANULOCYTES NFR BLD AUTO: 1 % (ref 0–2)
INR PPP: 5.55 (ref 0.84–1.19)
LYMPHOCYTES # BLD AUTO: 0.8 THOUSANDS/ÂΜL (ref 0.6–4.47)
LYMPHOCYTES NFR BLD AUTO: 14 % (ref 14–44)
MCH RBC QN AUTO: 26.7 PG (ref 26.8–34.3)
MCHC RBC AUTO-ENTMCNC: 31.3 G/DL (ref 31.4–37.4)
MCV RBC AUTO: 86 FL (ref 82–98)
MONOCYTES # BLD AUTO: 0.42 THOUSAND/ÂΜL (ref 0.17–1.22)
MONOCYTES NFR BLD AUTO: 7 % (ref 4–12)
NEUTROPHILS # BLD AUTO: 4.3 THOUSANDS/ÂΜL (ref 1.85–7.62)
NEUTS SEG NFR BLD AUTO: 73 % (ref 43–75)
NRBC BLD AUTO-RTO: 0 /100 WBCS
PLATELET # BLD AUTO: 249 THOUSANDS/UL (ref 149–390)
PMV BLD AUTO: 9.5 FL (ref 8.9–12.7)
POTASSIUM SERPL-SCNC: 3.5 MMOL/L (ref 3.5–5.3)
PROT SERPL-MCNC: 6.9 G/DL (ref 6.4–8.4)
PROTHROMBIN TIME: 50.6 SECONDS (ref 11.6–14.5)
QRS AXIS: -25 DEGREES
QRSD INTERVAL: 176 MS
QT INTERVAL: 536 MS
QTC INTERVAL: 566 MS
RBC # BLD AUTO: 3.18 MILLION/UL (ref 3.88–5.62)
SODIUM SERPL-SCNC: 141 MMOL/L (ref 135–147)
T WAVE AXIS: 74 DEGREES
UNIT DISPENSE STATUS: NORMAL
UNIT PRODUCT CODE: NORMAL
UNIT PRODUCT VOLUME: 280 ML
UNIT RH: NORMAL
VENTRICULAR RATE: 67 BPM
WBC # BLD AUTO: 5.85 THOUSAND/UL (ref 4.31–10.16)

## 2022-10-21 PROCEDURE — 99223 1ST HOSP IP/OBS HIGH 75: CPT | Performed by: INTERNAL MEDICINE

## 2022-10-21 PROCEDURE — 82948 REAGENT STRIP/BLOOD GLUCOSE: CPT

## 2022-10-21 PROCEDURE — 93010 ELECTROCARDIOGRAM REPORT: CPT | Performed by: INTERNAL MEDICINE

## 2022-10-21 PROCEDURE — 99232 SBSQ HOSP IP/OBS MODERATE 35: CPT | Performed by: SURGERY

## 2022-10-21 PROCEDURE — 80053 COMPREHEN METABOLIC PANEL: CPT | Performed by: PHYSICIAN ASSISTANT

## 2022-10-21 PROCEDURE — 85025 COMPLETE CBC W/AUTO DIFF WBC: CPT | Performed by: PHYSICIAN ASSISTANT

## 2022-10-21 PROCEDURE — 85610 PROTHROMBIN TIME: CPT | Performed by: PHYSICIAN ASSISTANT

## 2022-10-21 PROCEDURE — 87040 BLOOD CULTURE FOR BACTERIA: CPT | Performed by: PHYSICIAN ASSISTANT

## 2022-10-21 RX ORDER — INSULIN LISPRO 100 [IU]/ML
4-20 INJECTION, SOLUTION INTRAVENOUS; SUBCUTANEOUS
Status: DISCONTINUED | OUTPATIENT
Start: 2022-10-21 | End: 2022-10-24 | Stop reason: HOSPADM

## 2022-10-21 RX ORDER — CEFAZOLIN SODIUM 1 G/50ML
1000 SOLUTION INTRAVENOUS EVERY 8 HOURS
Status: DISCONTINUED | OUTPATIENT
Start: 2022-10-21 | End: 2022-10-24 | Stop reason: HOSPADM

## 2022-10-21 RX ORDER — LANOLIN ALCOHOL/MO/W.PET/CERES
6 CREAM (GRAM) TOPICAL ONCE
Status: COMPLETED | OUTPATIENT
Start: 2022-10-21 | End: 2022-10-21

## 2022-10-21 RX ORDER — METRONIDAZOLE 500 MG/1
500 TABLET ORAL EVERY 8 HOURS SCHEDULED
Status: DISCONTINUED | OUTPATIENT
Start: 2022-10-21 | End: 2022-10-24 | Stop reason: HOSPADM

## 2022-10-21 RX ORDER — PHYTONADIONE 5 MG/1
10 TABLET ORAL ONCE
Status: COMPLETED | OUTPATIENT
Start: 2022-10-21 | End: 2022-10-21

## 2022-10-21 RX ORDER — DEXTROSE, SODIUM CHLORIDE, AND POTASSIUM CHLORIDE 5; .45; .15 G/100ML; G/100ML; G/100ML
75 INJECTION INTRAVENOUS CONTINUOUS
Status: DISCONTINUED | OUTPATIENT
Start: 2022-10-21 | End: 2022-10-22

## 2022-10-21 RX ORDER — INSULIN LISPRO 100 [IU]/ML
4-20 INJECTION, SOLUTION INTRAVENOUS; SUBCUTANEOUS EVERY 6 HOURS SCHEDULED
Status: DISCONTINUED | OUTPATIENT
Start: 2022-10-21 | End: 2022-10-21

## 2022-10-21 RX ADMIN — PANTOPRAZOLE SODIUM 40 MG: 40 TABLET, DELAYED RELEASE ORAL at 05:15

## 2022-10-21 RX ADMIN — ATORVASTATIN CALCIUM 40 MG: 40 TABLET, FILM COATED ORAL at 18:01

## 2022-10-21 RX ADMIN — PHYTONADIONE 10 MG: 5 TABLET ORAL at 08:45

## 2022-10-21 RX ADMIN — METRONIDAZOLE 500 MG: 500 TABLET ORAL at 22:04

## 2022-10-21 RX ADMIN — SOTALOL HYDROCHLORIDE 80 MG: 80 TABLET ORAL at 08:45

## 2022-10-21 RX ADMIN — DEXTROSE, SODIUM CHLORIDE, AND POTASSIUM CHLORIDE 75 ML/HR: 5; .45; .15 INJECTION INTRAVENOUS at 12:11

## 2022-10-21 RX ADMIN — CEFAZOLIN SODIUM 1000 MG: 1 SOLUTION INTRAVENOUS at 23:08

## 2022-10-21 RX ADMIN — INSULIN LISPRO 4 UNITS: 100 INJECTION, SOLUTION INTRAVENOUS; SUBCUTANEOUS at 12:14

## 2022-10-21 RX ADMIN — CEFAZOLIN SODIUM 2000 MG: 2 SOLUTION INTRAVENOUS at 00:13

## 2022-10-21 RX ADMIN — ACETAMINOPHEN 975 MG: 325 TABLET, FILM COATED ORAL at 05:15

## 2022-10-21 RX ADMIN — CEFAZOLIN SODIUM 2000 MG: 2 SOLUTION INTRAVENOUS at 08:45

## 2022-10-21 RX ADMIN — CEFAZOLIN SODIUM 2000 MG: 2 SOLUTION INTRAVENOUS at 16:29

## 2022-10-21 RX ADMIN — DEXTROSE, SODIUM CHLORIDE, AND POTASSIUM CHLORIDE 75 ML/HR: 5; .45; .15 INJECTION INTRAVENOUS at 16:24

## 2022-10-21 RX ADMIN — Medication 6 MG: at 03:17

## 2022-10-21 RX ADMIN — METRONIDAZOLE 500 MG: 500 INJECTION, SOLUTION INTRAVENOUS at 08:45

## 2022-10-21 RX ADMIN — METRONIDAZOLE 500 MG: 500 TABLET ORAL at 16:29

## 2022-10-21 NOTE — ASSESSMENT & PLAN NOTE
Recent Labs     10/19/22  0545 10/20/22  0449 10/21/22  0920   CREATININE 1 93* 1 80* 2 47*   EGFR 30 33 22     Estimated Creatinine Clearance: 31 mL/min (A) (by C-G formula based on SCr of 2 47 mg/dL (H))  Unclear etiology  May be prerenal secondary to fluid overload as torsemide was held since admission ( he was taking 60 mg torsemide a day also patient with severe pulmonary hypertension being on 3 5 L plus since admission), unlikely ATN versus postobstructive    With restart torsemide today and monitor kidney function tomorrow  Bladder scans/PVRs  Intake output  avoid nephrotoxins and hypotension

## 2022-10-21 NOTE — ASSESSMENT & PLAN NOTE
Plan:  · IR tube exchanged on 10/19 and with bloody discharge today  · INR remains elevated today, status post 3 units FFP; S/p 2 5 mg of vitamin K 2 days ago  · Continue Ancef and Flagyl per primary team she  · Goal INR 2-3

## 2022-10-21 NOTE — ASSESSMENT & PLAN NOTE
Recent Labs     10/19/22  0545 10/20/22  0449 10/21/22  0920   HGB 9 2* 8 9* 8 5*     Appears chronic without signs of bleeding outside of the recent tube exchange

## 2022-10-21 NOTE — ASSESSMENT & PLAN NOTE
Wt Readings from Last 3 Encounters:   10/21/22 135 kg (298 lb)   09/26/22 (!) 139 kg (307 lb)   09/20/22 (!) 145 kg (319 lb)     Last echo LVEF 60%, by prostatic LD valve, mild to moderate mitral regurgitation, severe pulmonary hypertension  Without shortness of breath or symptoms suggestive of failure    With lower extremity edema  On torsemide 60 mg daily that was held since admission  Oxygen saturation 93- 96 % at room air, patient denied shortness of breath  Status post 3 5 L since admission  Might need torsemide to be restarted  Also recommend discontinuing IV fluids if not needed

## 2022-10-21 NOTE — PLAN OF CARE
Problem: Potential for Falls  Goal: Patient will remain free of falls  Description: INTERVENTIONS:  - Educate patient/family on patient safety including physical limitations  - Instruct patient to call for assistance with activity   - Consult OT/PT to assist with strengthening/mobility   - Keep Call bell within reach  - Keep bed low and locked with side rails adjusted as appropriate  - Keep care items and personal belongings within reach  - Initiate and maintain comfort rounds  - Make Fall Risk Sign visible to staff  - Initiate/Maintain bed/chair alarm  - Apply yellow socks and bracelet for high fall risk patients  - Consider moving patient to room near nurses station  Outcome: Progressing      Problem: GASTROINTESTINAL - ADULT  Goal: Minimal or absence of nausea and/or vomiting  Description: INTERVENTIONS:  - Administer IV fluids if ordered to ensure adequate hydration  - Maintain NPO status until nausea and vomiting are resolved  - Nasogastric tube if ordered  - Administer ordered antiemetic medications as needed  - Provide nonpharmacologic comfort measures as appropriate  - Advance diet as tolerated, if ordered  - Consider nutrition services referral to assist patient with adequate nutrition and appropriate food choices  Outcome: Progressing  Goal: Maintains or returns to baseline bowel function  Description: INTERVENTIONS:  - Assess bowel function  - Encourage oral fluids to ensure adequate hydration  - Administer IV fluids if ordered to ensure adequate hydration  - Administer ordered medications as needed  - Encourage mobilization and activity  - Consider nutritional services referral to assist patient with adequate nutrition and appropriate food choices  Outcome: Progressing  Goal: Maintains adequate nutritional intake  Description: INTERVENTIONS:  - Monitor percentage of each meal consumed  - Identify factors contributing to decreased intake, treat as appropriate  - Assist with meals as needed  - Monitor I&O, weight, and lab values if indicated  - Obtain nutrition services referral as needed  Outcome: Progressing  Goal: Establish and maintain optimal ostomy function  Description: INTERVENTIONS:  - Assess bowel function  - Encourage oral fluids to ensure adequate hydration  - Administer IV fluids if ordered to ensure adequate hydration   - Administer ordered medications as needed  - Encourage mobilization and activity  - Nutrition services referral to assist patient with appropriate food choices  - Assess stoma site  - Consider wound care consult   Outcome: Progressing  Goal: Oral mucous membranes remain intact  Description: INTERVENTIONS  - Assess oral mucosa and hygiene practices  - Implement preventative oral hygiene regimen  - Implement oral medicated treatments as ordered  - Initiate Nutrition services referral as needed  Outcome: Progressing     Problem: METABOLIC, FLUID AND ELECTROLYTES - ADULT  Goal: Electrolytes maintained within normal limits  Description: INTERVENTIONS:  - Monitor labs and assess patient for signs and symptoms of electrolyte imbalances  - Administer electrolyte replacement as ordered  - Monitor response to electrolyte replacements, including repeat lab results as appropriate  - Instruct patient on fluid and nutrition as appropriate  Outcome: Progressing  Goal: Fluid balance maintained  Description: INTERVENTIONS:  - Monitor labs   - Monitor I/O and WT  - Instruct patient on fluid and nutrition as appropriate  - Assess for signs & symptoms of volume excess or deficit  Outcome: Progressing  Goal: Glucose maintained within target range  Description: INTERVENTIONS:  - Monitor Blood Glucose as ordered  - Assess for signs and symptoms of hyperglycemia and hypoglycemia  - Administer ordered medications to maintain glucose within target range  - Assess nutritional intake and initiate nutrition service referral as needed  Outcome: Progressing     Problem: Nutrition/Hydration-ADULT  Goal: Nutrient/Hydration intake appropriate for improving, restoring or maintaining nutritional needs  Description: Monitor and assess patient's nutrition/hydration status for malnutrition  Collaborate with interdisciplinary team and initiate plan and interventions as ordered  Monitor patient's weight and dietary intake as ordered or per policy  Utilize nutrition screening tool and intervene as necessary  Determine patient's food preferences and provide high-protein, high-caloric foods as appropriate       INTERVENTIONS:  - Monitor oral intake, urinary output, labs, and treatment plans  - Assess nutrition and hydration status and recommend course of action  - Evaluate amount of meals eaten  - Assist patient with eating if necessary   - Allow adequate time for meals  - Recommend/ encourage appropriate diets, oral nutritional supplements, and vitamin/mineral supplements  - Order, calculate, and assess calorie counts as needed  - Recommend, monitor, and adjust tube feedings and TPN/PPN based on assessed needs  - Assess need for intravenous fluids  - Provide specific nutrition/hydration education as appropriate  - Include patient/family/caregiver in decisions related to nutrition  Outcome: Progressing

## 2022-10-21 NOTE — PROGRESS NOTES
Progress Note - General Surgery   Erick Harkins 81 y o  male MRN: [id number]  Unit/Bed#: W -01 Encounter: [id number]    Assessment:  81 M with hx cholecystitis status post percutaneous cholecystostomy placement on 09/01, returning with obstructed percutaneous cholecystostomy tube status post replacement 70/65, admission complicated by supratherapeutic INR requiring correction and evolving REMA  VS:  AVSS, room air  UOP:  Numerous unmeasured occurrences  Perc jeny: 135cc, bilious     AM Labs:   Serum creatinine 2 67 from 2 47  Potassium 3 4    WBC 5 97 from 5 85  Hemoglobin 8 4 from 8 5    INR 2 5    10/21 blood cultures: NGTD      Plan:  -trend INR, plan for resumption of oral warfarin with target INR 2-3  -diet as tolerated  - nephrology consultation for evolving REMA, recently restarted torsemide, will discuss management of diuresis versus fluid balance  -pain/nausea control  -out of bed, ambulate  -ID input appreciated will follow-up repeat blood cultures, plan for 10-14 day total course of Augmentin in setting of dislodged percutaneous cholecystostomy, now replaced  -disposition planning    Subjective/Objective       Subjective:  No acute complaints overnight    Objective:     Blood pressure 134/65, pulse 60, temperature (!) 97 4 °F (36 3 °C), resp  rate 18, weight (!) 142 kg (313 lb 12 8 oz), SpO2 97 %  ,Body mass index is 41 4 kg/m²        Intake/Output Summary (Last 24 hours) at 10/22/2022 0657  Last data filed at 10/22/2022 0522  Gross per 24 hour   Intake 2381 25 ml   Output 210 ml   Net 2171 25 ml       Invasive Devices  Report    Peripheral Intravenous Line  Duration           Peripheral IV 10/18/22 Right Antecubital 3 days    Peripheral IV 10/19/22 Left Antecubital 3 days          Drain  Duration           Cholecystostomy Tube RUQ 2 days                Physical Exam:     General:  No acute distress, morbidly obese  HEENT:  Normocephalic, atraumatic  Cardiovascular:  Intermittent sinus bradycardia  Respiratory:  No distress, room air  Abdomen:  Soft, minimally tender drain insertion site, protuberant but not distended, drain with bilious effluent  Extremities:  No clubbing or cyanosis  Neuro:  AAO x3  Skin:  Warm, dry      Lab, Imaging and other studies:  I have personally reviewed pertinent lab results    , CBC:   Lab Results   Component Value Date    WBC 5 97 10/22/2022    HGB 8 4 (L) 10/22/2022    HCT 26 9 (L) 10/22/2022    MCV 87 10/22/2022     10/22/2022    MCH 27 0 10/22/2022    MCHC 31 2 (L) 10/22/2022    RDW 17 5 (H) 10/22/2022    MPV 10 0 10/22/2022    NRBC 0 10/22/2022   , CMP:   Lab Results   Component Value Date    SODIUM 134 (L) 10/22/2022    K 3 4 (L) 10/22/2022    CL 99 10/22/2022    CO2 25 10/22/2022    BUN 48 (H) 10/22/2022    CREATININE 2 67 (H) 10/22/2022    CALCIUM 8 9 10/22/2022     (H) 10/21/2022    ALT 15 10/21/2022    ALKPHOS 176 (H) 10/21/2022    EGFR 20 10/22/2022   , Coagulation:   Lab Results   Component Value Date    INR 2 50 (H) 10/22/2022     VTE Pharmacologic Prophylaxis: Sequential compression device (Venodyne)   VTE Mechanical Prophylaxis: sequential compression device

## 2022-10-21 NOTE — ASSESSMENT & PLAN NOTE
INR 7 5 yesterday status post 2 FFP but decreased to 5 5 this morning 1 unit of FFP ordered per primary team  Warfarin held due to supratherapeutic INR      Continue sotalol at reduced dose of 80 mg due to renal function  Patient denied any bleeding  Monitor INR

## 2022-10-21 NOTE — ASSESSMENT & PLAN NOTE
Lab Results   Component Value Date    HGBA1C 6 3 (H) 03/01/2022       Recent Labs     10/20/22  1630 10/21/22  0020 10/21/22  0734 10/21/22  1113   POCGLU 166* 135 134 161*       Plan:  Continue insulin sliding scale with hypoglycemia protocol    Carb controlled diet when eating

## 2022-10-21 NOTE — PLAN OF CARE
Problem: Potential for Falls  Goal: Patient will remain free of falls  Description: INTERVENTIONS:  - Educate patient/family on patient safety including physical limitations  - Instruct patient to call for assistance with activity   - Consult OT/PT to assist with strengthening/mobility   - Keep Call bell within reach  - Keep bed low and locked with side rails adjusted as appropriate  - Keep care items and personal belongings within reach  - Initiate and maintain comfort rounds  - Make Fall Risk Sign visible to staff  - Apply yellow socks and bracelet for high fall risk patients  - Consider moving patient to room near nurses station  Outcome: Progressing     Problem: MOBILITY - ADULT  Goal: Maintain or return to baseline ADL function  Description: INTERVENTIONS:  -  Assess patient's ability to carry out ADLs; assess patient's baseline for ADL function and identify physical deficits which impact ability to perform ADLs (bathing, care of mouth/teeth, toileting, grooming, dressing, etc )  - Assess/evaluate cause of self-care deficits   - Assess range of motion  - Assess patient's mobility; develop plan if impaired  - Assess patient's need for assistive devices and provide as appropriate  - Encourage maximum independence but intervene and supervise when necessary  - Involve family in performance of ADLs  - Assess for home care needs following discharge   - Consider OT consult to assist with ADL evaluation and planning for discharge  - Provide patient education as appropriate  Outcome: Progressing  Goal: Maintains/Returns to pre admission functional level  Description: INTERVENTIONS:  - Perform BMAT or MOVE assessment daily    - Set and communicate daily mobility goal to care team and patient/family/caregiver     - Collaborate with rehabilitation services on mobility goals if consulted  - Out of bed for toileting  - Record patient progress and toleration of activity level   Outcome: Progressing     Problem: CARDIOVASCULAR - ADULT  Goal: Maintains optimal cardiac output and hemodynamic stability  Description: INTERVENTIONS:  - Monitor I/O, vital signs and rhythm  - Monitor for S/S and trends of decreased cardiac output  - Administer and titrate ordered vasoactive medications to optimize hemodynamic stability  - Assess quality of pulses, skin color and temperature  - Assess for signs of decreased coronary artery perfusion  - Instruct patient to report change in severity of symptoms  Outcome: Progressing  Goal: Absence of cardiac dysrhythmias or at baseline rhythm  Description: INTERVENTIONS:  - Continuous cardiac monitoring, vital signs, obtain 12 lead EKG if ordered  - Administer antiarrhythmic and heart rate control medications as ordered  - Monitor electrolytes and administer replacement therapy as ordered  Outcome: Progressing     Problem: GASTROINTESTINAL - ADULT  Goal: Minimal or absence of nausea and/or vomiting  Description: INTERVENTIONS:  - Administer IV fluids if ordered to ensure adequate hydration  - Maintain NPO status until nausea and vomiting are resolved  - Nasogastric tube if ordered  - Administer ordered antiemetic medications as needed  - Provide nonpharmacologic comfort measures as appropriate  - Advance diet as tolerated, if ordered  - Consider nutrition services referral to assist patient with adequate nutrition and appropriate food choices  Outcome: Progressing  Goal: Maintains or returns to baseline bowel function  Description: INTERVENTIONS:  - Assess bowel function  - Encourage oral fluids to ensure adequate hydration  - Administer IV fluids if ordered to ensure adequate hydration  - Administer ordered medications as needed  - Encourage mobilization and activity  - Consider nutritional services referral to assist patient with adequate nutrition and appropriate food choices  Outcome: Progressing  Goal: Maintains adequate nutritional intake  Description: INTERVENTIONS:  - Monitor percentage of each meal consumed  - Identify factors contributing to decreased intake, treat as appropriate  - Assist with meals as needed  - Monitor I&O, weight, and lab values if indicated  - Obtain nutrition services referral as needed  Outcome: Progressing  Goal: Establish and maintain optimal ostomy function  Description: INTERVENTIONS:  - Assess bowel function  - Encourage oral fluids to ensure adequate hydration  - Administer IV fluids if ordered to ensure adequate hydration   - Administer ordered medications as needed  - Encourage mobilization and activity  - Nutrition services referral to assist patient with appropriate food choices  - Assess stoma site  - Consider wound care consult   Outcome: Progressing  Goal: Oral mucous membranes remain intact  Description: INTERVENTIONS  - Assess oral mucosa and hygiene practices  - Implement preventative oral hygiene regimen  - Implement oral medicated treatments as ordered  - Initiate Nutrition services referral as needed  Outcome: Progressing     Problem: METABOLIC, FLUID AND ELECTROLYTES - ADULT  Goal: Electrolytes maintained within normal limits  Description: INTERVENTIONS:  - Monitor labs and assess patient for signs and symptoms of electrolyte imbalances  - Administer electrolyte replacement as ordered  - Monitor response to electrolyte replacements, including repeat lab results as appropriate  - Instruct patient on fluid and nutrition as appropriate  Outcome: Progressing  Goal: Fluid balance maintained  Description: INTERVENTIONS:  - Monitor labs   - Monitor I/O and WT  - Instruct patient on fluid and nutrition as appropriate  - Assess for signs & symptoms of volume excess or deficit  Outcome: Progressing  Goal: Glucose maintained within target range  Description: INTERVENTIONS:  - Monitor Blood Glucose as ordered  - Assess for signs and symptoms of hyperglycemia and hypoglycemia  - Administer ordered medications to maintain glucose within target range  - Assess nutritional intake and initiate nutrition service referral as needed  Outcome: Progressing     Problem: MUSCULOSKELETAL - ADULT  Goal: Maintain or return mobility to safest level of function  Description: INTERVENTIONS:  - Assess patient's ability to carry out ADLs; assess patient's baseline for ADL function and identify physical deficits which impact ability to perform ADLs (bathing, care of mouth/teeth, toileting, grooming, dressing, etc )  - Assess/evaluate cause of self-care deficits   - Assess range of motion  - Assess patient's mobility  - Assess patient's need for assistive devices and provide as appropriate  - Encourage maximum independence but intervene and supervise when necessary  - Involve family in performance of ADLs  - Assess for home care needs following discharge   - Consider OT consult to assist with ADL evaluation and planning for discharge  - Provide patient education as appropriate  Outcome: Progressing  Goal: Maintain proper alignment of affected body part  Description: INTERVENTIONS:  - Support, maintain and protect limb and body alignment  - Provide patient/ family with appropriate education  Outcome: Progressing

## 2022-10-21 NOTE — CONSULTS
Consultation - Infectious Disease   Aleena Delgado 81 y o  male MRN: [id number]  Unit/Bed#: W -01 Encounter: [id number]      IMPRESSION & RECOMMENDATIONS:     1  Gram positive jack in blood culture:  - In setting of bioprosthetic aortic valve and AICD  1 of 2 sets of blood cultures from 10/18 with late growth of a GPR  Repeat blood cultures collected today  Patient has been afebrile overall  He denies having any B-symptoms over last few months  He has no major wounds on exam other than one on right medial ankle which is superficial and does not appear infected  - GPR in 1 of 2 blood culture sets that is not identifiable on blood ID panel may very well be a contaminant (Bacillus species, Corynebacterium etc); we will need to follow up formal identification to determine if is of true concern or not and if further evaluation warrented  - follow up repeat blood cultures 10/21     2  Acute cholecystitis with associated catheter tract infection:  - Patient had likely malfunction/blocked perc jeny drain with resultant gallbladder infection, now s/p IR replacement of tube on 10/19 with purulent fluid found but no cultures sent  He was not systemically ill, and has resolved pain now  CT did show inflammatory changess about the catheter tract, concerning for possible catheter tunnel infection in addition to infection of gallbladder itself  - Prior cultures in September had grown Strep sanguis + Lactobacillus; as improving currently can continue Cefazolin (dose adjusted to 1g q8h based on renal function) plus PO Metronidazole 500 mg q8h  - please check BMP to renally adjust antibiotics  - Once ready for discharge, can switch to PO Amox-Clav, dose pending renal function  - Would plan to treat for 10-14 days from catheter removal/exchange given possible catheter tunnel infection    3   Phlegmonous changes of rectus abdominis muscles:  - This was seen on CT scan, related to his gallbladder infection and catheter tunnel inflammation/infection  - antibiotics as above  - total duration of  10-14 days from tube exchange as above    4  REMA: Estimated Creatinine Clearance: 31 mL/min (A) (by C-G formula based on SCr of 2 47 mg/dL (H))  - trend BMP  - renally adjust antibiotics       I have discussed the above management plan in detail with the primary service    I have performed an extensive review of the medical records in Epic including review of the notes, radiographs, and laboratory results     HISTORY OF PRESENT ILLNESS:  Reason for Consult:     HPI: Alisha Rodriguez is a 81y o  year old male PMHx infective endocarditis s/p bioprosthetic Aortic Valve (2014), Hx of DVT/PE s/p IVC filter, ventricular tachycardia s/p ICD (2014), T2DM, CKD, cholecystitis s/p percutaneous cholecystotomy tube (9/1/22) who presented on 10/18 with complaints of right sided abdominal pain  He was admitted on August 29th with HIDA scan showing acute cholecystitis  He underwent percutaneous cholecystostomy tube placement with IR and cultures grew Strep sanguis and Lactobacillus species on 9/1/22  He was eventually discharged to complete course of Amox-Clav  He had IR tube study on 10/13, tube was not changed with plans for reassessment and tube change in 6 weeks ( 3 months from placement)  Eventual plans are for gallbladder removal once he is cleared by his cardiologist and after gallbladder inflammation had improved  Patient said over the last week he developed intermittent RLQ abdominal pains near his tube  Then his wife noted decreased output from his drain with difficulty flushing  He denied fever or chills  CT done in ED noted cholelithiasis with distended gallbladder and inflammation of the gallbladder wall and adjacent fat, felt to be from obstructed catheter  Also noted probable phlegmon splaying the rectus abdominis musculature without abscess   IR was consulted and on 10/19 placed new 10 F tube, with 75 cc of purulent fluid removed from gallbladder  No cultures sent  He has been on IV Cefazolin + Metronidazole for treatment of recurrent cholecystitis in setting of malfunctioning perc jeny tube  He has been afebrile, without leukocytosis and reports today his abdominal pain is resolved  He feels well overall  Denies any wounds other than one to medial right ankle but is not having any significant pain there and is healing  Denies diarrhea  REVIEW OF SYSTEMS:  A complete review of systems is negative other than that noted in the HPI      PAST MEDICAL HISTORY:  Past Medical History:   Diagnosis Date   • Anemia    • Arthritis    • Atrial fibrillation ([facility] [address] )    • Basal cell carcinoma 03/22/2022    Tip of Nose   • CHF (congestive heart failure) ([facility])    • Diabetes mellitus (HCC)     Niddm   • DVT (deep vein thrombosis) in pregnancy 1966    not in pregnancy   • DVT (deep venous thrombosis) ([facility] [place] [address] ) 1966   • Dyslipidemia    • GERD (gastroesophageal reflux disease)    • Hyperlipidemia    • Hypertension    • Irregular heart beat     Afib   • Morbid obesity due to excess calories ([facility] [place] [address] )     Resolved 9/2/2014    • Pulmonary embolism (HCC)    • Sepsis ([facility] [address] )    • Squamous cell skin cancer 07/30/2020    Left posterior scalp   • Visual impairment      Past Surgical History:   Procedure Laterality Date   • AORTIC VALVE REPLACEMENT     • CARDIAC DEFIBRILLATOR PLACEMENT  04/2014   • CARDIAC SURGERY  02/2014    AVR   • COLONOSCOPY     • INSERT / REPLACE / REMOVE PACEMAKER     • IR CHOLECYSTOSTOMY TUBE CHECK/CHANGE/REPOSITION/REINSERTION/UPSIZE  10/13/2022   • IR CHOLECYSTOSTOMY TUBE CHECK/CHANGE/REPOSITION/REINSERTION/UPSIZE  10/19/2022   • IR CHOLECYSTOSTOMY TUBE PLACEMENT  9/1/2022   • JOINT REPLACEMENT Left     LTKR   • MOHS SURGERY  07/30/2020    Left posterior scalp, Dr Edouard Joseph   • MOHS SURGERY  04/18/2022    BCC Tip of Nose- Dr Edouard Joseph   • NE LIGATE/STRIP LONG [address] SEP-FEM JUNC Right 8/17/2018    Procedure: LEG PERFORATED INJECTION SCLEROTHERAPY; Surgeon: Ev Lovett MD;  Location: [facility];  Service: Vascular   • REPLACEMENT TOTAL KNEE Right    • TOTAL KNEE ARTHROPLASTY Left    • VASCULAR SURGERY     • VENA CAVA FILTER PLACEMENT      Interruption inferior vena cava, Kell filter, placement   • WISDOM TOOTH EXTRACTION         FAMILY HISTORY:  Non-contributory    SOCIAL HISTORY:  Social History   Social History     Substance and Sexual Activity   Alcohol Use Not Currently    Comment: no alcohol in 28 yrs     Social History     Substance and Sexual Activity   Drug Use Never     Social History     Tobacco Use   Smoking Status Never Smoker   Smokeless Tobacco Never Used       ALLERGIES:  Allergies   Allergen Reactions   • Tramadol Other (See Comments)     intolerance       MEDICATIONS:  All current active medications have been reviewed      PHYSICAL EXAM:  Temp:  [97 3 °F (36 3 °C)-99 5 °F (37 5 °C)] 98 8 °F (37 1 °C)  HR:  [54-67] 58  Resp:  [16-18] 18  BP: (110-125)/(48-78) 120/54  SpO2:  [91 %-98 %] 95 %  Temp (24hrs), Av 2 °F (36 8 °C), Min:97 3 °F (36 3 °C), Max:99 5 °F (37 5 °C)  Current: Temperature: 98 8 °F (37 1 °C)    Intake/Output Summary (Last 24 hours) at 10/21/2022 1521  Last data filed at 10/21/2022 1430  Gross per 24 hour   Intake 2446 67 ml   Output 505 ml   Net 1941 67 ml       General Appearance:  Appearing well, nontoxic, and in no distress   Head:  Normocephalic, without obvious abnormality, atraumatic   Eyes:  Conjunctiva pink and sclera anicteric, both eyes   Nose: Nares normal, mucosa normal, no drainage   Throat: Oropharynx moist without lesions   Neck: Supple, symmetrical, no adenopathy, no tenderness/mass/nodules   Back:   Symmetric, no curvature   Lungs:   Clear to auscultation bilaterally, respirations unlabored   Chest Wall:  No tenderness or deformity   Heart:  RRR   Abdomen:   Soft, non-tender,  No rebound or guarding; perc jeny tube in place with dark brown/green fluid in bag   Extremities: Chronic venous stasis of both legs; right medial ankle with superficial wound without erythema or drainage   Skin: No rashes or lesions  No draining wounds noted  Lymph nodes: Cervical, supraclavicular nodes normal   Neurologic: Alert and oriented times 3       LABS, IMAGING, & OTHER STUDIES:  Lab Results:  I have personally reviewed pertinent labs  Results from last 7 days   Lab Units 10/21/22  0920 10/20/22  0449 10/19/22  0545   WBC Thousand/uL 5 85 6 08 7 85   HEMOGLOBIN g/dL 8 5* 8 9* 9 2*   PLATELETS Thousands/uL 249 246 251     Results from last 7 days   Lab Units 10/21/22  0920 10/20/22  0449 10/19/22  0545   SODIUM mmol/L 141 141 140   POTASSIUM mmol/L 3 5 3 5 3 7   CHLORIDE mmol/L 103 104 103   CO2 mmol/L 25 27 27   BUN mg/dL 46* 47* 50*   CREATININE mg/dL 2 47* 1 80* 1 93*   EGFR ml/min/1 73sq m 22 33 30   CALCIUM mg/dL 8 9 9 0 9 3   AST U/L 112* 221* 49*   ALT U/L 15 94* 49   ALK PHOS U/L 176* 204* 80     Results from last 7 days   Lab Units 10/18/22  1926 10/18/22  1847   BLOOD CULTURE   --  No Growth at 48 hrs  GRAM STAIN RESULT  Gram positive rods*  --      Results from last 7 days   Lab Units 10/18/22  1847   PROCALCITONIN ng/ml 0 10       Imaging Studies:   I have personally reviewed pertinent imaging study reports and images in PACS  CTA chest/abd/pelvis with contrast 10/18:  1  No filling defect to suggest acute or chronic pulmonary embolism in the entire pulmonary arterial system  Measured RV/LV ratio is within normal limits at less than 0 9     2   Cholelithiasis with distended gallbladder and inflammation of the gallbladder wall and adjacent fat despite appropriately positioned cholecystostomy tube, likely related to obstructed catheter  There are inflammatory changes about the catheter tract   with probable phlegmon splaying the rectus abdominis musculature without discrete collection/abscess  Other Studies:   I have personally reviewed pertinent reports        Audrey Combs MD  [facility]

## 2022-10-21 NOTE — PROGRESS NOTES
Progress Note - Kierra Butler 81 y o  male MRN: [id number]    Unit/Bed#: W -01 Encounter: [id number]      Assessment:  Kierra Butler is a 81 y o  male with cholecystitis s/p perc jeny tube placement 9/1, presented with obstructed perc jeny tube, s/p replacement by IR 10/19, now with supra therapeutic INR    INR overnight 5 5 from 7 3 s/p 2u FFP  Additional unit of FFP given for INR 5 5    Plan:  F/u am INR  Diet as tolerated  Hold warfarin in setting of supra therapeutic INR  F/u outpt for dosing and resumption  Pain and nausea control PRN  OOB and ambulate  Dispo planning    Subjective:   Patient seen and examined bedside  States he had some nausea yesterday when he had chicken noodle soup  Otherwise has no continued episodes  No emesis at this time  Passing flatus, no bowel movement this admission  Objective:     Vitals: Blood pressure 125/58, pulse 67, temperature 97 9 °F (36 6 °C), temperature source Oral, resp  rate 18, weight (!) 140 kg (308 lb 10 3 oz), SpO2 93 %  ,Body mass index is 40 72 kg/m²  Intake/Output Summary (Last 24 hours) at 10/21/2022 1636  Last data filed at 10/20/2022 2340  Gross per 24 hour   Intake 2484 17 ml   Output 300 ml   Net 2184 17 ml       Physical Exam:   General - no acute distress, responsive and cooperative  CV - warm, regular rate  Pulm - normal work of breathing, no respiratory distress  Abd - soft, nondistended, perc jeny w/bilious/sang output  Neuro - m/s grossly intact, cn grossly intact  Ext - moving all extremities       Invasive Devices  Report    Peripheral Intravenous Line  Duration           Peripheral IV 10/18/22 Right Antecubital 2 days    Peripheral IV 10/19/22 Left Antecubital 2 days          Drain  Duration           Cholecystostomy Tube RUQ 1 day                Lab, Imaging and other studies: I have personally reviewed pertinent reports      VTE Pharmacologic Prophylaxis: Reason for no pharmacologic prophylaxis supra therapeutic INR  VTE Mechanical Prophylaxis: sequential compression device

## 2022-10-21 NOTE — PROGRESS NOTES
[facility]  Progress Note - Khai Marshall 1935, 81 y o  male MRN: [id number]  Unit/Bed#: W -01 Encounter: [id number]  Primary Care Provider: Meg Alcantara DO   Date and time admitted to hospital: 10/18/2022  5:38 PM    Acute kidney injury [facility])  Assessment & Plan  Recent Labs     10/19/22  0545 10/20/22  0449 10/21/22  0920   CREATININE 1 93* 1 80* 2 47*   EGFR 30 33 22     Estimated Creatinine Clearance: 31 mL/min (A) (by C-G formula based on SCr of 2 47 mg/dL (H))  Unclear etiology  May be prerenal secondary to fluid overload as torsemide was held since admission ( he was taking 60 mg torsemide a day also patient with severe pulmonary hypertension being on 3 5 L plus since admission), unlikely ATN versus postobstructive  With restart torsemide today and monitor kidney function tomorrow  Bladder scans/PVRs  Intake output  avoid nephrotoxins and hypotension      * Acute cholecystitis  Assessment & Plan  Plan:  · IR tube exchanged on 10/19 and with bloody discharge today  · INR remains elevated today, status post 3 units FFP; S/p 2 5 mg of vitamin K 2 days ago  · Continue Ancef and Flagyl per primary team she  · Goal INR 2-3    Hypomagnesemia  Assessment & Plan  Currently 1 8, goal greater than/equal to 2  Was repleted yesterday    Chronic deep vein thrombosis (DVT) (HCC)  Assessment & Plan  History lower extremity DVT in 2015  Goal INR 2-3    OBINNA (obstructive sleep apnea)  Assessment & Plan  Continue CPAP    Diastolic congestive heart failure (HCC)  Assessment & Plan  Wt Readings from Last 3 Encounters:   10/21/22 135 kg (298 lb)   09/26/22 (!) 139 kg (307 lb)   09/20/22 (!) 145 kg (319 lb)     Last echo LVEF 60%, by prostatic LD valve, mild to moderate mitral regurgitation, severe pulmonary hypertension  Without shortness of breath or symptoms suggestive of failure    With lower extremity edema  On torsemide 60 mg daily that was held since admission  Oxygen saturation 93- 96 % at room air, patient denied shortness of breath  Status post 3 5 L since admission  Might need torsemide to be restarted  Also recommend discontinuing IV fluids if not needed      Stage 3a chronic kidney disease with elevated creatinine  Assessment & Plan  Lab Results   Component Value Date    EGFR 22 10/21/2022    EGFR 33 10/20/2022    EGFR 30 10/19/2022    CREATININE 2 47 (H) 10/21/2022    CREATININE 1 80 (H) 10/20/2022    CREATININE 1 93 (H) 10/19/2022     Appears close to baseline of 1 6-1 9  Continue to trend daily BMP  Torsemide was held since admission    Paroxysmal atrial fibrillation (HCC)  Assessment & Plan  INR 7 5 yesterday status post 2 FFP but decreased to 5 5 this morning 1 unit of FFP ordered per primary team  Warfarin held due to supratherapeutic INR  Continue sotalol at reduced dose of 80 mg due to renal function  Patient denied any bleeding  Monitor INR    Anemia  Assessment & Plan  Recent Labs     10/19/22  0545 10/20/22  0449 10/21/22  0920   HGB 9 2* 8 9* 8 5*     Appears chronic without signs of bleeding outside of the recent tube exchange    Type 2 diabetes mellitus with diabetic neuropathy, without long-term current use of insulin [facility])  Assessment & Plan  Lab Results   Component Value Date    HGBA1C 6 3 (H) 03/01/2022       Recent Labs     10/20/22  1630 10/21/22  0020 10/21/22  0734 10/21/22  1113   POCGLU 166* 135 134 161*       Plan:  Continue insulin sliding scale with hypoglycemia protocol  Carb controlled diet when eating      VTE Pharmacologic Prophylaxis: VTE Score: 7 High Risk (Score >/= 5) - Pharmacological DVT Prophylaxis Contraindicated  Sequential Compression Devices Ordered  Patient Centered Rounds: I performed bedside rounds with nursing staff today    Discussions with Specialists or Other Care Team Provider: cm    Education and Discussions with Family / Patient: defer to primary team       Current Length of Stay: 3 day(s)  Current Patient Status: Inpatient Discharge Plan: per primary team     Code Status: Prior    Subjective:   Patient in recliner in no distress  Said he has lower extremity edema there chronic, denied shortness of breath chest pain or abdominal pain  Had no chills or fevers  Denied any bleeding except from the tube    Objective:     Vitals:   Temp (24hrs), Av 2 °F (36 8 °C), Min:97 3 °F (36 3 °C), Max:99 5 °F (37 5 °C)    Temp:  [97 3 °F (36 3 °C)-99 5 °F (37 5 °C)] 98 5 °F (36 9 °C)  HR:  [51-67] 51  Resp:  [18] 18  BP: (110-125)/(48-78) 122/62  SpO2:  [91 %-98 %] 98 %  Body mass index is 39 32 kg/m²  Input and Output Summary (last 24 hours): Intake/Output Summary (Last 24 hours) at 10/21/2022 1627  Last data filed at 10/21/2022 1430  Gross per 24 hour   Intake 2446 67 ml   Output 505 ml   Net 1941 67 ml       Physical Exam:   Physical Exam  Vitals reviewed  Constitutional:       General: He is not in acute distress  Appearance: He is obese  He is not diaphoretic  Eyes:      General: No scleral icterus  Right eye: No discharge  Left eye: No discharge  Cardiovascular:      Rate and Rhythm: Normal rate and regular rhythm  Heart sounds: Murmur heard  Pulmonary:      Effort: No respiratory distress  Breath sounds: Normal breath sounds  No wheezing, rhonchi or rales  Abdominal:      General: There is distension  Palpations: Abdomen is soft  Tenderness: There is no abdominal tenderness  There is no guarding or rebound  Musculoskeletal:      Right lower leg: Edema present  Left lower leg: Edema present  Skin:     General: Skin is warm  Coloration: Skin is pale  Neurological:      Mental Status: He is alert  Psychiatric:         Mood and Affect: Mood normal          Behavior: Behavior normal          Thought Content:  Thought content normal          Judgment: Judgment normal           Additional Data:     Labs:  Results from last 7 days   Lab Units 10/21/22  0920   WBC Thousand/uL 5 85   HEMOGLOBIN g/dL 8 5*   HEMATOCRIT % 27 2*   PLATELETS Thousands/uL 249   NEUTROS PCT % 73   LYMPHS PCT % 14   MONOS PCT % 7   EOS PCT % 5     Results from last 7 days   Lab Units 10/21/22  0920   SODIUM mmol/L 141   POTASSIUM mmol/L 3 5   CHLORIDE mmol/L 103   CO2 mmol/L 25   BUN mg/dL 46*   CREATININE mg/dL 2 47*   ANION GAP mmol/L 13   CALCIUM mg/dL 8 9   ALBUMIN g/dL 3 3*   TOTAL BILIRUBIN mg/dL 1 38*   ALK PHOS U/L 176*   ALT U/L 15   AST U/L 112*   GLUCOSE RANDOM mg/dL 165*     Results from last 7 days   Lab Units 10/21/22  0521   INR  5 55*     Results from last 7 days   Lab Units 10/21/22  1113 10/21/22  0734 10/21/22  0020 10/20/22  1630 10/20/22  1139 10/20/22  0454 10/19/22  2356 10/19/22  2203 10/19/22  1803 10/19/22  1154 10/19/22  0739 10/19/22  0544   POC GLUCOSE mg/dl 161* 134 135 166* 137 122 144* 152* 131 137 126 108         Results from last 7 days   Lab Units 10/18/22  1847   LACTIC ACID mmol/L 1 2   PROCALCITONIN ng/ml 0 10       Lines/Drains:  Invasive Devices  Report    Peripheral Intravenous Line  Duration           Peripheral IV 10/18/22 Right Antecubital 2 days    Peripheral IV 10/19/22 Left Antecubital 2 days          Drain  Duration           Cholecystostomy Tube RUQ 1 day                      Imaging: No pertinent imaging reviewed  Recent Cultures (last 7 days):   Results from last 7 days   Lab Units 10/21/22  0912 10/18/22  1926 10/18/22  1847   BLOOD CULTURE  Received in Microbiology Lab  Culture in Progress  Received in Microbiology Lab  Culture in Progress  --  No Growth at 48 hrs     GRAM STAIN RESULT   --  Gram positive rods*  --        Last 24 Hours Medication List:   Current Facility-Administered Medications   Medication Dose Route Frequency Provider Last Rate   • acetaminophen  975 mg Oral Q8H [address] Becky Salas MD     • albuterol  2 puff Inhalation Q4H PRN Becky Saals MD     • atorvastatin  40 mg Oral After Marcelina Carnes MD     • cefazolin 2,000 mg Intravenous Q8H Brendan Sweet MD Stopped (10/21/22 1100)   • dextrose 5 % and sodium chloride 0 45 % with KCl 20 mEq/L  75 mL/hr Intravenous Continuous Sixto Nunes PA-C 75 mL/hr (10/21/22 1624)   • HYDROmorphone  0 2 mg Intravenous Q3H PRN Yecenia Blanco PA-C     • influenza vaccine  0 7 mL Intramuscular Prior to discharge Clifford Ventura DO     • insulin lispro  4-20 Units Subcutaneous 4x Daily (AC & HS) Endy Ventura DO     • metroNIDAZOLE  500 mg Oral Q8H [address] Latrelle Kehr, MD     • oxyCODONE  2 5 mg Oral Q6H PRN Yecenia Blanco PA-C     • oxyCODONE  5 mg Oral Q6H PRN Yecenia Blanco PA-C     • pantoprazole  40 mg Oral Early Morning Maxine Hall MD     • pneumococcal 23-valent polysaccharide vaccine  0 5 mL Subcutaneous Prior to discharge Clifford Ventura DO     • scopolamine  1 patch Transdermal Q72H PRN Brendan Sweet MD     • sotalol  80 mg Oral Daily Alfredo Leone MD          Today, Patient Was Seen By: Maximino Dutta    **Please Note: This note may have been constructed using a voice recognition system  **

## 2022-10-21 NOTE — ASSESSMENT & PLAN NOTE
Lab Results   Component Value Date    EGFR 22 10/21/2022    EGFR 33 10/20/2022    EGFR 30 10/19/2022    CREATININE 2 47 (H) 10/21/2022    CREATININE 1 80 (H) 10/20/2022    CREATININE 1 93 (H) 10/19/2022     Appears close to baseline of 1 6-1 9  Continue to trend daily BMP    Torsemide was held since admission

## 2022-10-22 LAB
ANION GAP SERPL CALCULATED.3IONS-SCNC: 10 MMOL/L (ref 4–13)
BASOPHILS # BLD AUTO: 0.03 THOUSANDS/ÂΜL (ref 0–0.1)
BASOPHILS NFR BLD AUTO: 1 % (ref 0–1)
BUN SERPL-MCNC: 48 MG/DL (ref 5–25)
CALCIUM SERPL-MCNC: 8.9 MG/DL (ref 8.4–10.2)
CHLORIDE SERPL-SCNC: 99 MMOL/L (ref 96–108)
CO2 SERPL-SCNC: 25 MMOL/L (ref 21–32)
CREAT SERPL-MCNC: 2.67 MG/DL (ref 0.6–1.3)
EOSINOPHIL # BLD AUTO: 0.26 THOUSAND/ÂΜL (ref 0–0.61)
EOSINOPHIL NFR BLD AUTO: 4 % (ref 0–6)
ERYTHROCYTE [DISTWIDTH] IN BLOOD BY AUTOMATED COUNT: 17.5 % (ref 11.6–15.1)
GFR SERPL CREATININE-BSD FRML MDRD: 20 ML/MIN/1.73SQ M
GLUCOSE SERPL-MCNC: 107 MG/DL (ref 65–140)
GLUCOSE SERPL-MCNC: 132 MG/DL (ref 65–140)
GLUCOSE SERPL-MCNC: 134 MG/DL (ref 65–140)
GLUCOSE SERPL-MCNC: 137 MG/DL (ref 65–140)
GLUCOSE SERPL-MCNC: 160 MG/DL (ref 65–140)
HCT VFR BLD AUTO: 26.9 % (ref 36.5–49.3)
HGB BLD-MCNC: 8.4 G/DL (ref 12–17)
IMM GRANULOCYTES # BLD AUTO: 0.04 THOUSAND/UL (ref 0–0.2)
IMM GRANULOCYTES NFR BLD AUTO: 1 % (ref 0–2)
INR PPP: 2.5 (ref 0.84–1.19)
LYMPHOCYTES # BLD AUTO: 1.03 THOUSANDS/ÂΜL (ref 0.6–4.47)
LYMPHOCYTES NFR BLD AUTO: 17 % (ref 14–44)
MCH RBC QN AUTO: 27 PG (ref 26.8–34.3)
MCHC RBC AUTO-ENTMCNC: 31.2 G/DL (ref 31.4–37.4)
MCV RBC AUTO: 87 FL (ref 82–98)
MONOCYTES # BLD AUTO: 0.59 THOUSAND/ÂΜL (ref 0.17–1.22)
MONOCYTES NFR BLD AUTO: 10 % (ref 4–12)
NEUTROPHILS # BLD AUTO: 4.02 THOUSANDS/ÂΜL (ref 1.85–7.62)
NEUTS SEG NFR BLD AUTO: 67 % (ref 43–75)
NRBC BLD AUTO-RTO: 0 /100 WBCS
PLATELET # BLD AUTO: 261 THOUSANDS/UL (ref 149–390)
PMV BLD AUTO: 10 FL (ref 8.9–12.7)
POTASSIUM SERPL-SCNC: 3.4 MMOL/L (ref 3.5–5.3)
PROTHROMBIN TIME: 27.2 SECONDS (ref 11.6–14.5)
RBC # BLD AUTO: 3.11 MILLION/UL (ref 3.88–5.62)
SODIUM SERPL-SCNC: 134 MMOL/L (ref 135–147)
WBC # BLD AUTO: 5.97 THOUSAND/UL (ref 4.31–10.16)

## 2022-10-22 PROCEDURE — 85610 PROTHROMBIN TIME: CPT | Performed by: PHYSICIAN ASSISTANT

## 2022-10-22 PROCEDURE — 99232 SBSQ HOSP IP/OBS MODERATE 35: CPT | Performed by: INTERNAL MEDICINE

## 2022-10-22 PROCEDURE — 85025 COMPLETE CBC W/AUTO DIFF WBC: CPT | Performed by: PHYSICIAN ASSISTANT

## 2022-10-22 PROCEDURE — 99223 1ST HOSP IP/OBS HIGH 75: CPT | Performed by: INTERNAL MEDICINE

## 2022-10-22 PROCEDURE — 80048 BASIC METABOLIC PNL TOTAL CA: CPT | Performed by: PHYSICIAN ASSISTANT

## 2022-10-22 PROCEDURE — 93005 ELECTROCARDIOGRAM TRACING: CPT

## 2022-10-22 PROCEDURE — 87205 SMEAR GRAM STAIN: CPT | Performed by: INTERNAL MEDICINE

## 2022-10-22 PROCEDURE — 82948 REAGENT STRIP/BLOOD GLUCOSE: CPT

## 2022-10-22 PROCEDURE — 99233 SBSQ HOSP IP/OBS HIGH 50: CPT | Performed by: SURGERY

## 2022-10-22 PROCEDURE — 84166 PROTEIN E-PHORESIS/URINE/CSF: CPT | Performed by: INTERNAL MEDICINE

## 2022-10-22 RX ORDER — POTASSIUM CHLORIDE 20 MEQ/1
40 TABLET, EXTENDED RELEASE ORAL ONCE
Status: COMPLETED | OUTPATIENT
Start: 2022-10-22 | End: 2022-10-22

## 2022-10-22 RX ORDER — WARFARIN SODIUM 4 MG/1
4 TABLET ORAL
Status: DISCONTINUED | OUTPATIENT
Start: 2022-10-22 | End: 2022-10-24 | Stop reason: HOSPADM

## 2022-10-22 RX ORDER — ALBUMIN (HUMAN) 12.5 G/50ML
25 SOLUTION INTRAVENOUS 2 TIMES DAILY
Status: COMPLETED | OUTPATIENT
Start: 2022-10-22 | End: 2022-10-23

## 2022-10-22 RX ADMIN — ALBUMIN (HUMAN) 25 G: 0.25 INJECTION, SOLUTION INTRAVENOUS at 12:46

## 2022-10-22 RX ADMIN — ATORVASTATIN CALCIUM 40 MG: 40 TABLET, FILM COATED ORAL at 18:12

## 2022-10-22 RX ADMIN — POTASSIUM CHLORIDE 40 MEQ: 1500 TABLET, EXTENDED RELEASE ORAL at 07:53

## 2022-10-22 RX ADMIN — SOTALOL HYDROCHLORIDE 80 MG: 80 TABLET ORAL at 08:06

## 2022-10-22 RX ADMIN — INSULIN LISPRO 4 UNITS: 100 INJECTION, SOLUTION INTRAVENOUS; SUBCUTANEOUS at 12:45

## 2022-10-22 RX ADMIN — METRONIDAZOLE 500 MG: 500 TABLET ORAL at 05:09

## 2022-10-22 RX ADMIN — CEFAZOLIN SODIUM 1000 MG: 1 SOLUTION INTRAVENOUS at 16:47

## 2022-10-22 RX ADMIN — WARFARIN SODIUM 4 MG: 4 TABLET ORAL at 18:12

## 2022-10-22 RX ADMIN — ACETAMINOPHEN 975 MG: 325 TABLET, FILM COATED ORAL at 14:42

## 2022-10-22 RX ADMIN — METRONIDAZOLE 500 MG: 500 TABLET ORAL at 14:42

## 2022-10-22 RX ADMIN — ALBUMIN (HUMAN) 25 G: 0.25 INJECTION, SOLUTION INTRAVENOUS at 18:12

## 2022-10-22 RX ADMIN — METRONIDAZOLE 500 MG: 500 TABLET ORAL at 21:53

## 2022-10-22 RX ADMIN — ACETAMINOPHEN 975 MG: 325 TABLET, FILM COATED ORAL at 05:09

## 2022-10-22 RX ADMIN — CEFAZOLIN SODIUM 1000 MG: 1 SOLUTION INTRAVENOUS at 22:43

## 2022-10-22 RX ADMIN — PANTOPRAZOLE SODIUM 40 MG: 40 TABLET, DELAYED RELEASE ORAL at 05:09

## 2022-10-22 RX ADMIN — CEFAZOLIN SODIUM 1000 MG: 1 SOLUTION INTRAVENOUS at 07:52

## 2022-10-22 NOTE — QUICK NOTE
Discussed with cardiology- okay to reduce dose from 80 mg BID to once a day  Discussed with primary team regarding telemetry versus EKG  Plan for now is to get EKG and if concern for QTc prolongation worsening  Will start off with EKG and if further QTc prolongation will place on telemetry

## 2022-10-22 NOTE — CONSULTS
Consultation - Nephrology   Rojelio Garcia 81 y o  male MRN: [id number]  Unit/Bed#: W -01 Encounter: [id number]    ASSESSMENT and PLAN:  Acute kidney injury on chronic kidney disease stage IIIB  -Baseline creatinine:1 5 to 1 9 mg/dl based on blood work from May 2022 to September 2022  -Admission creatinine: on 10/18 was 1 92 mg/dl  - Work up:   · UA with microscopy:  From 10/18 was bland  · Imaging:  CT without any hydronephrosis, finding of  -Etiology:  Acute kidney injury likely due to hemodynamic changes as well as contrast nephropathy with patient receiving CTA with IV contrast on 10/18 and renal function worsening since 10/21  Less likely AIN as he has been on antibiotic for less than a week and does not have any peripheral eosinophilia  -Hospital Course:  Status post IV contrast with CT on 10/18  Renal function worsened to creatinine 2 4 on 10/21 and further worsening today to 2 6  -Plan:   · Renal function continue to worsen, no improvement renal function despite IV fluid started on 10/21 by primary team   Most likely this is ATN from CT with IV contrast/contrast nephropathy  Will stop further IV fluids, stressed on oral hydration  If any worsening volume status may need to consider IV Lasix  Would hold off on Lasix for today  Ordered for IV albumin  · Bladder scan was negative for urine retention  · In the setting of worsening renal function with EGFR around 20, recommend discussion with Cardiology regarding the dose of sotalol as there is risk of QT prolongation with worsening renal function  · Less likely GN as UA was bland on 10/18, check SPEP UPEP light chain ratio in the setting of anemia  Consider renal ultrasound if there is no improvement in renal function  Check urine eosinophils  · Discussed with patient that renal function may worsen further until it plateaus    No indication for renal replacement therapy at this time  · Agree with holding torsemide for now  · Avoid nephrotoxins and dose all medications per EGFR  · Avoid hypotension  Hypokalemia:  Potassium 3 4, replaced with oral potassium chloride    Hyponatremia:  Sodium 134 likely due to hypotonic IV fluid which has been stopped, continue to monitor    Anemia, hemoglobin currently stable at 8 4 gram/deciliter, MCV 87 continue to monitor per primary team  -checking SPEP UPEP light chain ratio    Lower extremity edema/as per patient has a history of lymphedema and uses compression stockings at home  -weight trending up slightly to 142 kg from 140 kg  -hold off on further IV fluids, ordered for IV albumin   -may consider Lasix if any worsening shortness of breath or respiratory distress    Acute cholecystitis, with cholecystostomy which was blocked and exchange on 10/19 by IR  Surgery team on board  Also finding of Gram-positive rods in blood culture in the setting of bioprosthetic AV well and AICD, ID on board  Follow-up repeat blood cultures    Others obstructive sleep apnea/chronic lower extremity DVT/paroxysmal AFib/diabetes mellitus type 2      Discussed with primary team    HISTORY OF PRESENT ILLNESS:  Requesting Physician: Corwin Martinez DO  Reason for Consult: REMA Paulino is a 81 y o  male with history of AFib on Coumadin, aortic valve replacement, CHF, hypertension, hyperlipidemia, DVT/PE, cholecystitis with placement of a percutaneous cholecystostomy tube on 09/01 who was admitted to [facility] after presenting with recurrent abdominal pain  She was plan for elective cholecystectomy after clearance by Cardiology  She was found to have obstructed cholecystostomy tube on CT abdomen with contrast done on 10/18  Underwent exchange of cholecystostomy tube on 10/19 by IR    Admission creatinine was within recent baseline but worsened to creatinine 2 6 on 10/22 for which Nephrology was consulted    PAST MEDICAL HISTORY:  Past Medical History: Diagnosis Date   • Anemia    • Arthritis    • Atrial fibrillation ([facility] )    • Basal cell carcinoma 03/22/2022    Tip of Nose   • CHF (congestive heart failure) (HCC)    • Diabetes mellitus (HCC)     Niddm   • DVT (deep vein thrombosis) in pregnancy 1966    not in pregnancy   • DVT (deep venous thrombosis) ([facility] ) 1966   • Dyslipidemia    • GERD (gastroesophageal reflux disease)    • Hyperlipidemia    • Hypertension    • Irregular heart beat     Afib   • Morbid obesity due to excess calories ([facility] )     Resolved 9/2/2014    • Pulmonary embolism (HCC)    • Sepsis ([facility] )    • Squamous cell skin cancer 07/30/2020    Left posterior scalp   • Visual impairment        PAST SURGICAL HISTORY:  Past Surgical History:   Procedure Laterality Date   • AORTIC VALVE REPLACEMENT     • CARDIAC DEFIBRILLATOR PLACEMENT  04/2014   • CARDIAC SURGERY  02/2014    AVR   • COLONOSCOPY     • INSERT / REPLACE / REMOVE PACEMAKER     • IR CHOLECYSTOSTOMY TUBE CHECK/CHANGE/REPOSITION/REINSERTION/UPSIZE  10/13/2022   • IR CHOLECYSTOSTOMY TUBE CHECK/CHANGE/REPOSITION/REINSERTION/UPSIZE  10/19/2022   • IR CHOLECYSTOSTOMY TUBE PLACEMENT  9/1/2022   • JOINT REPLACEMENT Left     LTKR   • MOHS SURGERY  07/30/2020    Left posterior scalp, Dr Ari Reardon   • [address]  04/18/2022    [facility] Tip of Nose- Dr Ari Reardon   • FL LIGATE/STRIP LONG [address] SEP-FEM JUNC Right 8/17/2018    Procedure: LEG PERFORATED INJECTION SCLEROTHERAPY;  Surgeon: Keily Salmeron MD;  Location: [facility]  MAIN OR;  Service: Vascular   • REPLACEMENT TOTAL KNEE Right    • TOTAL KNEE ARTHROPLASTY Left    • VASCULAR SURGERY     • VENA CAVA FILTER PLACEMENT      Interruption inferior vena cava, [place] filter, placement   • WISDOM TOOTH EXTRACTION         SOCIAL HISTORY:  Social History     Substance and Sexual Activity   Alcohol Use Not Currently    Comment: no alcohol in 28 yrs     Social History     Substance and Sexual Activity   Drug Use Never     Social History     Tobacco Use Smoking Status Never Smoker   Smokeless Tobacco Never Used       FAMILY HISTORY:  Family History   Problem Relation Age of Onset   • Arthritis Mother    • Stroke Mother    • Arthritis Father    • Cancer Father    • Arthritis Daughter        ALLERGIES:  Allergies   Allergen Reactions   • Tramadol Other (See Comments)     intolerance       MEDICATIONS:    Current Facility-Administered Medications:   •  acetaminophen (TYLENOL) tablet 975 mg, 975 mg, Oral, Q8H [facility], Lynette Virk MD, 975 mg at 10/22/22 0509  •  albuterol (PROVENTIL HFA,VENTOLIN HFA) inhaler 2 puff, 2 puff, Inhalation, Q4H PRN, Lynette Virk MD  •  atorvastatin (LIPITOR) tablet 40 mg, 40 mg, Oral, After Dinner, Lynette Virk MD, 40 mg at 10/21/22 1801  •  ceFAZolin (ANCEF) IVPB (premix in dextrose) 1,000 mg 50 mL, 1,000 mg, Intravenous, Q8H, Mariano Mueller MD, Last Rate: 100 mL/hr at 10/22/22 0752, 1,000 mg at 10/22/22 0752  •  dextrose 5 % and sodium chloride 0 45 % with KCl 20 mEq/L infusion, 75 mL/hr, Intravenous, Continuous, Julianne Bunch PA-C, Last Rate: 75 mL/hr at 10/21/22 1624, 75 mL/hr at 10/21/22 1624  •  HYDROmorphone HCl (DILAUDID) injection 0 2 mg, 0 2 mg, Intravenous, Q3H PRN, Yecenia Blanco PA-C  •  influenza vaccine, high-dose (FLUZONE HIGH-DOSE) IM injection RAMON 0 7 mL, 0 7 mL, Intramuscular, Prior to discharge, Marcus Ventura DO  •  insulin lispro (HumaLOG) 100 units/mL subcutaneous injection 4-20 Units, 4-20 Units, Subcutaneous, 4x Daily (AC & HS), 4 Units at 10/21/22 1214 **AND** Fingerstick Glucose (POCT), , , 4x Daily AC and at bedtime, Endy Ventura DO  •  metroNIDAZOLE (FLAGYL) tablet 500 mg, 500 mg, Oral, Q8H [facility], Mariano Mueller MD, 500 mg at 10/22/22 0509  •  oxyCODONE (ROXICODONE) IR tablet 2 5 mg, 2 5 mg, Oral, Q6H PRN, Yecenia Blanco PA-C  •  oxyCODONE (ROXICODONE) IR tablet 5 mg, 5 mg, Oral, Q6H PRN, Yecenia Blanco PA-C  •  pantoprazole (PROTONIX) EC tablet 40 mg, 40 mg, Oral, Early Morning, Maxine Bentley MD, 40 mg at 10/22/22 0106  •  pneumococcal 23-valent polysaccharide vaccine (PNEUMOVAX-23) injection 0 5 mL, 0 5 mL, Subcutaneous, Prior to discharge, Gomez Ventura DO  •  scopolamine (TRANSDERM-SCOP) 1 mg/3 days TD 72 hr patch 1 patch, 1 patch, Transdermal, Q72H PRN, Elda Garcia MD, 1 patch at 10/19/22 1837  •  sotalol (BETAPACE) tablet 80 mg, 80 mg, Oral, Daily, Alfredo Donnelly MD, 80 mg at 10/22/22 2825  •  warfarin (COUMADIN) tablet 4 mg, 4 mg, Oral, Daily (warfarin), Susie Yeung MD    REVIEW OF SYSTEMS:   Review of Systems   Constitutional: Negative for activity change, appetite change, chills, diaphoresis, fatigue and fever  HENT: Negative for congestion, facial swelling and nosebleeds  Eyes: Negative for pain and visual disturbance  Respiratory: Negative for cough, chest tightness and shortness of breath  Cardiovascular: Positive for leg swelling (Is chronic and use compression stockings at home)  Negative for chest pain and palpitations  Gastrointestinal: Negative for abdominal distention, abdominal pain, diarrhea, nausea and vomiting  Genitourinary: Negative for difficulty urinating, dysuria, flank pain, frequency and hematuria  Musculoskeletal: Negative for arthralgias, back pain and joint swelling  Skin: Negative for rash  Neurological: Negative for dizziness, seizures, syncope, weakness and headaches  Psychiatric/Behavioral: Negative for agitation and confusion  The patient is not nervous/anxious  All the systems were reviewed and were negative except as documented on the HPI      PHYSICAL EXAM:  Current Weight: Weight - Scale: (!) 142 kg (313 lb 12 8 oz)  First Weight: Weight - Scale: (!) 140 kg (308 lb 10 3 oz)  Vitals:    10/21/22 2021 10/21/22 2316 10/22/22 0600 10/22/22 0740   BP: 134/65   160/53   Pulse: 60   (!) 50   Resp: 18   16   Temp: (!) 97 4 °F (36 3 °C)   98 4 °F (36 9 °C)   TempSrc: SpO2: 98% 97%  98%   Weight:   (!) 142 kg (313 lb 12 8 oz)        Intake/Output Summary (Last 24 hours) at 10/22/2022 1018  Last data filed at 10/22/2022 0522  Gross per 24 hour   Intake 2371 25 ml   Output 200 ml   Net 2171 25 ml     Physical Exam  Constitutional:       General: He is not in acute distress  Appearance: He is well-developed  He is not diaphoretic  HENT:      Head: Normocephalic and atraumatic  Mouth/Throat:      Mouth: Mucous membranes are moist    Eyes:      General: No scleral icterus  Conjunctiva/sclera: Conjunctivae normal       Pupils: Pupils are equal, round, and reactive to light  Neck:      Thyroid: No thyromegaly  Cardiovascular:      Rate and Rhythm: Normal rate and regular rhythm  Heart sounds: Normal heart sounds  No murmur heard  No friction rub  Pulmonary:      Effort: Pulmonary effort is normal  No respiratory distress  Breath sounds: Normal breath sounds  No wheezing or rales  Abdominal:      General: Bowel sounds are normal  There is no distension  Palpations: Abdomen is soft  Tenderness: There is no abdominal tenderness  Musculoskeletal:         General: No deformity  Cervical back: Neck supple  Right lower leg: Edema (1+) present  Left lower leg: Edema (1+) present  Lymphadenopathy:      Cervical: No cervical adenopathy  Skin:     Coloration: Skin is not pale  Nails: There is no clubbing  Neurological:      Mental Status: He is alert and oriented to person, place, and time  He is not disoriented  Psychiatric:         Mood and Affect: Mood normal  Mood is not anxious  Affect is not inappropriate  Behavior: Behavior normal          Thought Content:  Thought content normal            Invasive Devices:        Lab Results:   Results from last 7 days   Lab Units 10/22/22  0529 10/21/22  0920 10/20/22  0449 10/19/22  0545 10/18/22  1847   WBC Thousand/uL 5 97 5 85 6 08 7 85 7 94   HEMOGLOBIN g/dL 8 4* 8  5* 8 9* 9 2* 9 9*   HEMATOCRIT % 26 9* 27 2* 28 5* 29 6* 31 5*   PLATELETS Thousands/uL 261 249 246 251 262   POTASSIUM mmol/L 3 4* 3 5 3 5 3 7 4 2   CHLORIDE mmol/L 99 103 104 103 98   CO2 mmol/L 25 25 27 27 28   BUN mg/dL 48* 46* 47* 50* 54*   CREATININE mg/dL 2 67* 2 47* 1 80* 1 93* 1 92*   CALCIUM mg/dL 8 9 8 9 9 0 9 3 9 7   MAGNESIUM mg/dL  --   --   --  1 8* 1 8*   PHOSPHORUS mg/dL  --   --  5 0* 5 0*  --    ALK PHOS U/L  --  176* 204* 80 99   ALT U/L  --  15 94* 49 61*   AST U/L  --  112* 221* 49* 68*       Other Studies:   CT chest abdomen pelvis with contrast  IMPRESSION:     1  No filling defect to suggest acute or chronic pulmonary embolism in the entire pulmonary arterial system  Measured RV/LV ratio is within normal limits at less than 0 9     2   Cholelithiasis with distended gallbladder and inflammation of the gallbladder wall and adjacent fat despite appropriately positioned cholecystostomy tube, likely related to obstructed catheter  There are inflammatory changes about the catheter tract   with probable phlegmon splaying the rectus abdominis musculature without discrete collection/abscess  a 4 2 cm simple cysts at the upper pole right kidney, as well as multiple bilateral smaller circumscribed renal hypodensities that are too small to accurately characterize, which   are statistically benign findings requiring no further workup or follow-up     Portions of the record may have been created with voice recognition software  Occasional wrong word or "sound a like" substitutions may have occurred due to the inherent limitations of voice recognition software  Read the chart carefully and recognize, using context, where substitutions have occurred  If you have any questions, please contact the dictating provider

## 2022-10-22 NOTE — PROGRESS NOTES
Progress Note - Kierra Butler 81 y o  male MRN: [id number]    Unit/Bed#: W -01 Encounter: [id number]      Assessment:  Kierra Butler is a 81 y o  male with cholecystitis s/p perc jeny tube placement 9/1, presented with obstructed perc jeny tube, s/p replacement by IR 10/19, course c/b supra therapeutic INR and REMA    INR yesterday therapeutic, warfarin resumed at 4mg QD  Cre 2 51 from 2 67  Repeat bcx [id number], prior likely contaminant   Perc jeny drain: 40 cc output turbid/bilious w/sediment    Plan:  F/u am INR and cre  Nephrology consulted, appreciate recommendations  Diet as tolerated  ID consulted, appreciate recommendations, continue abx while inpatient, will dose adjust PO abx on d/c  F/u with PCP for dosing adjustments of INR  Pain and nausea control PRN  OOB and ambulate  Dispo planning    Subjective:   Patient seen examined bedside  No acute complaints  Voiding spontaneously  Out of bed ambulating  States he has a decreased appetite for the food here, but he's still eating as much as he can  Having bowel movements  Objective:     Vitals: Blood pressure 118/59, pulse (!) 54, temperature 98 6 °F (37 °C), resp  rate 16, weight (!) 138 kg (303 lb 6 4 oz), SpO2 95 %  ,Body mass index is 40 03 kg/m²        Intake/Output Summary (Last 24 hours) at 10/23/2022 9203  Last data filed at 10/23/2022 0300  Gross per 24 hour   Intake 1275 ml   Output 475 ml   Net 800 ml       Physical Exam:   General - no acute distress, responsive and cooperative  CV - warm, regular rate  Pulm - normal work of breathing, no respiratory distress  Abd - soft, nondistended, perc jeny w/bilious/turbid output  Neuro - m/s grossly intact, cn grossly intact  Ext - moving all extremities       Invasive Devices  Report    Peripheral Intravenous Line  Duration           Peripheral IV 10/18/22 Right Antecubital 4 days    Peripheral IV 10/22/22 Left;Ventral (anterior) Forearm <1 day          Drain  Duration Cholecystostomy Tube RUQ 3 days                Lab, Imaging and other studies: I have personally reviewed pertinent reports      VTE Pharmacologic Prophylaxis: Reason for no pharmacologic prophylaxis supra therapeutic INR  VTE Mechanical Prophylaxis: sequential compression device

## 2022-10-22 NOTE — ASSESSMENT & PLAN NOTE
Lab Results   Component Value Date    HGBA1C 6 3 (H) 03/01/2022       Recent Labs     10/21/22  2018 10/22/22  0737 10/22/22  1109 10/22/22  1509   POCGLU 147* 137 160* 132       Plan:  Continue insulin sliding scale with hypoglycemia protocol    Carb controlled diet

## 2022-10-22 NOTE — PROGRESS NOTES
Progress Note - Infectious Disease   Erick Harkins 81 y o  male MRN: [id number]  Unit/Bed#: W -01 Encounter: [id number]      Impression/Plan:  1  Gram-positive jack bacteremia  One of 2 blood cultures isolate diphtheroids  Noted to be with late growth  Repeat cultures remain negative at 24 hours  Patient is overall hemodynamically stable  No other progressive her proceeding symptoms  Previous episode of endocarditis was in 2014 with strep mitis  Suspect these cultures represent contaminant  Continue antibiotics as below  Continue to trend fever curve/vitals  Repeat chemistry tomorrow  Duration of therapy as below  Follow-up repeat blood cultures while admitted  Additional supportive care as per primary  Notified ID office staff to follow-up final cultures as outpatient    2  Acute cholecystitis with possible tract infection  Patient likely developed malfunction/blockage of his percutaneous cholecystostomy tube  Status post IR intervention on 10/19  No cultures were sent  Systemically appears well  No residual pain  CT showed some inflammatory changes along the catheter tract  Course complicated by 3  Continue Ancef 1 g q 8  Continue Flagyl 500 q 8  Continue to trend fever curve/vitals  Repeat chemistry tomorrow  Tentative plan for total of 10 days of therapy from intervention through 10/28  Transition oral Augmentin at discharge  Will need to dose adjust antibiotic based on creatinine at discharge  Additional supportive care as per primary    3  Acute kidney injury on chronic kidney disease  Acute elevation in creatinine noted from baseline  Likely multifactorial given issues above  Patient seen by Nephrology today  Continue antibiotics as above  Repeat chemistry tomorrow  Ongoing follow-up by Nephrology  Will need to dose adjust oral antibiotic regimen at discharge  Avoid nephrotoxic agents    Above plan discussed in detail with the patient, his wife and surgical resident      ID consult service will re-evaluate this patient again on Monday, if admitted  Please contact ID attending on call if questions in the interim  Antibiotics:  Ancef/flagyl 5    Subjective:  Patient seen at bedside and he denied having any nausea vomiting, chest pain or shortness of breath  Denies significant abdominal discomfort  Renal function unfortunately worsened  Patient is still hopeful to go home tomorrow if kidney function improves  Discussed the need for antibiotic dose adjustment with surgical resident  Additionally reviewed prior records and patient was noted to have a 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   Current blood cultures reviewed  Objective:  Vitals:  Temp:  [97 4 °F (36 3 °C)-98 6 °F (37 °C)] 98 6 °F (37 °C)  HR:  [50-60] 57  Resp:  [16-18] 17  BP: (110-160)/(43-65) 110/43  SpO2:  [97 %-98 %] 98 %  Temp (24hrs), Av 2 °F (36 8 °C), Min:97 4 °F (36 3 °C), Max:98 6 °F (37 °C)  Current: Temperature: 98 6 °F (37 °C)    Physical Exam:   General Appearance:  Alert, interactive, nontoxic, no acute distress  Throat: Oropharynx moist without lesions  Lungs:   Clear to auscultation bilaterally; no wheezes, rhonchi or rales; respirations unlabored on room air   Heart:  RRR; no murmur, rub or gallop appreciated   Abdomen:   Soft, non-tender, non-distended, positive bowel sounds  No discomfort appreciated in the right upper quadrant  The drain is putting out purulent bilious drainage  Extremities: No clubbing, cyanosis or edema   Skin: No new rashes or lesions  No new draining wounds noted         Labs, Imaging, & Other studies:   All pertinent labs and imaging studies were personally reviewed  Results from last 7 days   Lab Units 10/22/22  0529 10/21/22  0920 10/20/22  0449   WBC Thousand/uL 5 97 5 85 6 08   HEMOGLOBIN g/dL 8 4* 8 5* 8 9*   PLATELETS Thousands/uL 261 249 246     Results from last 7 days   Lab Units 10/22/22  0529 10/21/22  0920 10/20/22  0449 10/19/22  0545   POTASSIUM mmol/L 3 4* 3 5 3 5 3 7   CHLORIDE mmol/L 99 103 104 103   CO2 mmol/L 25 25 27 27   BUN mg/dL 48* 46* 47* 50*   CREATININE mg/dL 2 67* 2 47* 1 80* 1 93*   EGFR ml/min/1 73sq m 20 22 33 30   CALCIUM mg/dL 8 9 8 9 9 0 9 3   AST U/L  --  112* 221* 49*   ALT U/L  --  15 94* 49   ALK PHOS U/L  --  176* 204* 80     Results from last 7 days   Lab Units 10/21/22  0912 10/18/22  1926 10/18/22  1847   BLOOD CULTURE  No Growth at 24 hrs  No Growth at 24 hrs  Diphtheroids No Growth at 72 hrs     GRAM STAIN RESULT   --  Gram positive rods*  --

## 2022-10-23 PROBLEM — R78.81 BACTEREMIA: Status: ACTIVE | Noted: 2022-10-23

## 2022-10-23 LAB
ALBUMIN SERPL BCP-MCNC: 3.6 G/DL (ref 3.5–5)
ALL TARGETS: NOT DETECTED
ALP SERPL-CCNC: 149 U/L (ref 34–104)
ALT SERPL W P-5'-P-CCNC: <3 U/L (ref 7–52)
ANION GAP SERPL CALCULATED.3IONS-SCNC: 11 MMOL/L (ref 4–13)
AST SERPL W P-5'-P-CCNC: 49 U/L (ref 13–39)
ATRIAL RATE: 57 BPM
BACTERIA BLD CULT: ABNORMAL
BASOPHILS # BLD AUTO: 0.03 THOUSANDS/ÂΜL (ref 0–0.1)
BASOPHILS NFR BLD AUTO: 0 % (ref 0–1)
BILIRUB SERPL-MCNC: 1.33 MG/DL (ref 0.2–1)
BUN SERPL-MCNC: 47 MG/DL (ref 5–25)
CALCIUM SERPL-MCNC: 9 MG/DL (ref 8.4–10.2)
CHLORIDE SERPL-SCNC: 103 MMOL/L (ref 96–108)
CO2 SERPL-SCNC: 23 MMOL/L (ref 21–32)
CREAT SERPL-MCNC: 2.51 MG/DL (ref 0.6–1.3)
EOSINOPHIL # BLD AUTO: 0.26 THOUSAND/ÂΜL (ref 0–0.61)
EOSINOPHIL NFR BLD AUTO: 4 % (ref 0–6)
EOSINOPHIL NFR URNS MANUAL: 0 %
ERYTHROCYTE [DISTWIDTH] IN BLOOD BY AUTOMATED COUNT: 17.7 % (ref 11.6–15.1)
GFR SERPL CREATININE-BSD FRML MDRD: 22 ML/MIN/1.73SQ M
GLUCOSE SERPL-MCNC: 109 MG/DL (ref 65–140)
GLUCOSE SERPL-MCNC: 109 MG/DL (ref 65–140)
GLUCOSE SERPL-MCNC: 113 MG/DL (ref 65–140)
GLUCOSE SERPL-MCNC: 118 MG/DL (ref 65–140)
GLUCOSE SERPL-MCNC: 120 MG/DL (ref 65–140)
GLUCOSE SERPL-MCNC: 145 MG/DL (ref 65–140)
GRAM STN SPEC: ABNORMAL
HCT VFR BLD AUTO: 26.7 % (ref 36.5–49.3)
HGB BLD-MCNC: 8.5 G/DL (ref 12–17)
IMM GRANULOCYTES # BLD AUTO: 0.04 THOUSAND/UL (ref 0–0.2)
IMM GRANULOCYTES NFR BLD AUTO: 1 % (ref 0–2)
INR PPP: 1.98 (ref 0.84–1.19)
LYMPHOCYTES # BLD AUTO: 1.02 THOUSANDS/ÂΜL (ref 0.6–4.47)
LYMPHOCYTES NFR BLD AUTO: 15 % (ref 14–44)
MCH RBC QN AUTO: 27.2 PG (ref 26.8–34.3)
MCHC RBC AUTO-ENTMCNC: 31.8 G/DL (ref 31.4–37.4)
MCV RBC AUTO: 86 FL (ref 82–98)
MONOCYTES # BLD AUTO: 0.62 THOUSAND/ÂΜL (ref 0.17–1.22)
MONOCYTES NFR BLD AUTO: 9 % (ref 4–12)
NEUTROPHILS # BLD AUTO: 4.82 THOUSANDS/ÂΜL (ref 1.85–7.62)
NEUTS SEG NFR BLD AUTO: 71 % (ref 43–75)
NRBC BLD AUTO-RTO: 0 /100 WBCS
PLATELET # BLD AUTO: 255 THOUSANDS/UL (ref 149–390)
PMV BLD AUTO: 10.3 FL (ref 8.9–12.7)
POTASSIUM SERPL-SCNC: 3.7 MMOL/L (ref 3.5–5.3)
PROT SERPL-MCNC: 6.8 G/DL (ref 6.4–8.4)
PROTHROMBIN TIME: 22.7 SECONDS (ref 11.6–14.5)
QRS AXIS: 137 DEGREES
QRSD INTERVAL: 176 MS
QT INTERVAL: 584 MS
QTC INTERVAL: 578 MS
RBC # BLD AUTO: 3.12 MILLION/UL (ref 3.88–5.62)
SODIUM SERPL-SCNC: 137 MMOL/L (ref 135–147)
T WAVE AXIS: 16 DEGREES
VENTRICULAR RATE: 59 BPM
WBC # BLD AUTO: 6.79 THOUSAND/UL (ref 4.31–10.16)

## 2022-10-23 PROCEDURE — 85610 PROTHROMBIN TIME: CPT | Performed by: STUDENT IN AN ORGANIZED HEALTH CARE EDUCATION/TRAINING PROGRAM

## 2022-10-23 PROCEDURE — 86334 IMMUNOFIX E-PHORESIS SERUM: CPT | Performed by: INTERNAL MEDICINE

## 2022-10-23 PROCEDURE — 93010 ELECTROCARDIOGRAM REPORT: CPT | Performed by: INTERNAL MEDICINE

## 2022-10-23 PROCEDURE — NC001 PR NO CHARGE: Performed by: SURGERY

## 2022-10-23 PROCEDURE — 99232 SBSQ HOSP IP/OBS MODERATE 35: CPT | Performed by: INTERNAL MEDICINE

## 2022-10-23 PROCEDURE — 80053 COMPREHEN METABOLIC PANEL: CPT | Performed by: SURGERY

## 2022-10-23 PROCEDURE — 84165 PROTEIN E-PHORESIS SERUM: CPT | Performed by: INTERNAL MEDICINE

## 2022-10-23 PROCEDURE — 82948 REAGENT STRIP/BLOOD GLUCOSE: CPT

## 2022-10-23 PROCEDURE — 85025 COMPLETE CBC W/AUTO DIFF WBC: CPT | Performed by: SURGERY

## 2022-10-23 PROCEDURE — 83521 IG LIGHT CHAINS FREE EACH: CPT | Performed by: INTERNAL MEDICINE

## 2022-10-23 RX ORDER — FUROSEMIDE 10 MG/ML
40 INJECTION INTRAMUSCULAR; INTRAVENOUS ONCE
Status: COMPLETED | OUTPATIENT
Start: 2022-10-23 | End: 2022-10-23

## 2022-10-23 RX ORDER — POTASSIUM CHLORIDE 20 MEQ/1
20 TABLET, EXTENDED RELEASE ORAL ONCE
Status: COMPLETED | OUTPATIENT
Start: 2022-10-23 | End: 2022-10-23

## 2022-10-23 RX ADMIN — ACETAMINOPHEN 975 MG: 325 TABLET, FILM COATED ORAL at 05:01

## 2022-10-23 RX ADMIN — FUROSEMIDE 40 MG: 10 INJECTION, SOLUTION INTRAMUSCULAR; INTRAVENOUS at 10:55

## 2022-10-23 RX ADMIN — ATORVASTATIN CALCIUM 40 MG: 40 TABLET, FILM COATED ORAL at 17:38

## 2022-10-23 RX ADMIN — CEFAZOLIN SODIUM 1000 MG: 1 SOLUTION INTRAVENOUS at 07:50

## 2022-10-23 RX ADMIN — WARFARIN SODIUM 4 MG: 4 TABLET ORAL at 17:38

## 2022-10-23 RX ADMIN — CEFAZOLIN SODIUM 1000 MG: 1 SOLUTION INTRAVENOUS at 15:49

## 2022-10-23 RX ADMIN — ALBUMIN (HUMAN) 25 G: 0.25 INJECTION, SOLUTION INTRAVENOUS at 10:47

## 2022-10-23 RX ADMIN — POTASSIUM CHLORIDE 20 MEQ: 1500 TABLET, EXTENDED RELEASE ORAL at 07:51

## 2022-10-23 RX ADMIN — METRONIDAZOLE 500 MG: 500 TABLET ORAL at 05:01

## 2022-10-23 RX ADMIN — METRONIDAZOLE 500 MG: 500 TABLET ORAL at 22:12

## 2022-10-23 RX ADMIN — CEFAZOLIN SODIUM 1000 MG: 1 SOLUTION INTRAVENOUS at 22:35

## 2022-10-23 RX ADMIN — PANTOPRAZOLE SODIUM 40 MG: 40 TABLET, DELAYED RELEASE ORAL at 05:01

## 2022-10-23 RX ADMIN — ACETAMINOPHEN 975 MG: 325 TABLET, FILM COATED ORAL at 14:56

## 2022-10-23 RX ADMIN — METRONIDAZOLE 500 MG: 500 TABLET ORAL at 14:56

## 2022-10-23 RX ADMIN — SOTALOL HYDROCHLORIDE 80 MG: 80 TABLET ORAL at 10:26

## 2022-10-23 NOTE — ASSESSMENT & PLAN NOTE
· 1/2 blood culture showed g negative rods bacteremia  · Repeated blood culture showed no growth at 48 hours  · ID on board  · Continue cefazolin and metronidazole

## 2022-10-23 NOTE — ASSESSMENT & PLAN NOTE
Initially warfarin was held due to supratherapeutic INR, INR normalized and the warfarin was resumed yesterday    Continue sotalol at reduced dose of 80 mg due to renal function  Monitor INR

## 2022-10-23 NOTE — ASSESSMENT & PLAN NOTE
Recent Labs     10/21/22  0920 10/22/22  0529 10/23/22  0502   CREATININE 2 47* 2 67* 2 51*   EGFR 22 20 22     Estimated Creatinine Clearance: 30 8 mL/min (A) (by C-G formula based on SCr of 2 51 mg/dL (H))     Nephrology on board, appreciate recommendations  Status post 40 IV Lasix today  Bladder scans/PVRs  Intake output  avoid nephrotoxins and hypotension

## 2022-10-23 NOTE — ASSESSMENT & PLAN NOTE
Plan:  · IR tube exchanged on 10/19  · INR remains elevated today, status post 3 units FFP; S/p 2 5 mg of vitamin K 2 since admission  · Continue Ancef and Flagyl per primary team she  · Goal INR 2-3

## 2022-10-23 NOTE — ASSESSMENT & PLAN NOTE
Recent Labs     10/21/22  0920 10/22/22  0529 10/23/22  0502   HGB 8 5* 8 4* 8 5*     Appears chronic without signs of bleeding outside of the recent tube exchange

## 2022-10-23 NOTE — PROGRESS NOTES
NEPHROLOGY PROGRESS NOTE   Helder Williamson 81 y o  male MRN: [id number]  Unit/Bed#: W -01 Encounter: [id number]    ASSESSMENT & PLAN:  79-year-old male presented with abdominal pain and found to have obstructed cholecystostomy tube which was exchanged on 10/19 by IR, nephrology consulted for REMA    Acute kidney injury on chronic kidney disease stage IIIB  -Baseline creatinine:1 5 to 1 9 mg/dl based on blood work from May 2022 to September 2022  -Admission creatinine: on 10/18 was 1 92 mg/dl  - Work up:   · UA with microscopy:  From 10/18 was bland  · Imaging:  CT without any hydronephrosis, finding of  -Etiology:  Acute kidney injury likely due to hemodynamic changes as well as contrast nephropathy with patient receiving CTA with IV contrast on 10/18 and renal function worsening since 10/21     Most likely has ATN with IV contrast  Less likely AIN as he has been on antibiotic for less than a week and does not have any peripheral eosinophilia, also low urine eosinophils  -Hospital Course:  Status post IV contrast with CT on 10/18  Renal function worsened to creatinine 2 4 on 10/21 a peak creatinine 2 6 on 10/22 at the time of consultation, was taken off IV fluids on 10/22, given IV albumin  -Plan:   · Renal function improving today to creatinine 2 5 mg/dL with stable electrolytes  Urine output improving over last 24 hours to around 800 mL, has lower extremity edema will give IV Lasix 40 mg x 1 dose and monitor  · Follow-up SPEP UPEP light chain ratio which was ordered in the setting of worsening renal function and anemia  · Bladder scan was negative for retention   · Internal Medicine Team discussed with Cardiology and decrease the dose of sotalol to 80 mg daily in the setting of worsening renal function and monitoring EKG  · No indication for renal replacement therapy at this time  · Avoid nephrotoxins and dose all medications per EGFR  · Avoid hypotension     Hypokalemia: resolved  Continue to monitor as patient receiving IV Lasix today     Hyponatremia:  Sodium 134 on 10/22  likely due to hypotonic IV fluid which was stopped and sodium level improving to normal range today     Anemia, hemoglobin currently stable at 8 5 gram/deciliter, MCV 87 continue to monitor per primary team  -checking SPEP UPEP light chain ratio     Lower extremity edema/as per patient has a history of lymphedema and uses compression stockings at home  -weight currently stable at 303 lb  -ordered IV Lasix     Acute cholecystitis, with cholecystostomy which was blocked and exchange on 10/19 by IR  Surgery team on board  Also finding of Gram-positive rods in blood culture in the setting of bioprosthetic AV well and AICD, ID on board  Follow-up repeat blood cultures-negative for last 24 hours  Patient followed by ID and noted plan for 10 days of antibiotics through 10/28     Others obstructive sleep apnea/chronic lower extremity DVT/paroxysmal AFib/diabetes mellitus type 2     Discussed with surgical team    SUBJECTIVE:  Patient still with lower extremity edema, no chest pain or shortness of breath, no nausea vomiting    OBJECTIVE:  Current Weight: Weight - Scale: (!) 138 kg (303 lb 6 4 oz)  Vitals:    10/23/22 0719   BP: 118/59   Pulse: (!) 54   Resp: 16   Temp: 98 6 °F (37 °C)   SpO2: 95%       Intake/Output Summary (Last 24 hours) at 10/23/2022 0901  Last data filed at 10/23/2022 0300  Gross per 24 hour   Intake 1275 ml   Output 875 ml   Net 400 ml       Physical Exam  General:  Ill looking, awake  Eyes: Conjunctivae pink,  Sclera anicteric  ENT: lips and mucous membranes moist  Neck: supple   Chest: Clear to Auscultation both lungs,  no crackles, ronchus or wheezing  CVS: S1 & S2 present, normal rate, regular rhythm, no murmur  Abdomen: soft, non-tender, non-distended, Bowel sounds normoactive    Has cholecystostomy tube  Extremities: 2 +  edema of  legs  Skin: no rash  Neuro: awake, alert, oriented x 3   Psych: Mood and affect appropriate     Medications:    Current Facility-Administered Medications:   •  acetaminophen (TYLENOL) tablet 975 mg, 975 mg, Oral, Q8H [address], All Sauer MD, 975 mg at 10/23/22 0501  •  albumin human (FLEXBUMIN) 25 % injection 25 g, 25 g, Intravenous, BID, Layla Collins MD, 25 g at 10/22/22 1812  •  albuterol (PROVENTIL HFA,VENTOLIN HFA) inhaler 2 puff, 2 puff, Inhalation, Q4H PRN, All Sauer MD  •  atorvastatin (LIPITOR) tablet 40 mg, 40 mg, Oral, After Dinner, All Sauer MD, 40 mg at 10/22/22 1812  •  ceFAZolin (ANCEF) IVPB (premix in dextrose) 1,000 mg 50 mL, 1,000 mg, Intravenous, Q8H, Matt Rosa MD, Last Rate: 100 mL/hr at 10/23/22 0750, 1,000 mg at 10/23/22 0750  •  HYDROmorphone HCl (DILAUDID) injection 0 2 mg, 0 2 mg, Intravenous, Q3H PRN, Yecenia Blanco PA-C  •  influenza vaccine, high-dose (FLUZONE HIGH-DOSE) IM injection RAMON 0 7 mL, 0 7 mL, Intramuscular, Prior to discharge, Peter Ventura DO  •  insulin lispro (HumaLOG) 100 units/mL subcutaneous injection 4-20 Units, 4-20 Units, Subcutaneous, 4x Daily (AC & HS), 4 Units at 10/22/22 1245 **AND** Fingerstick Glucose (POCT), , , 4x Daily AC and at bedtime, Endy Ventura DO  •  metroNIDAZOLE (FLAGYL) tablet 500 mg, 500 mg, Oral, Q8H [address], Matt Rosa MD, 500 mg at 10/23/22 0501  •  oxyCODONE (ROXICODONE) IR tablet 2 5 mg, 2 5 mg, Oral, Q6H PRN, Yecenia Blanco PA-C  •  oxyCODONE (ROXICODONE) IR tablet 5 mg, 5 mg, Oral, Q6H PRN, Yecenia Blanco PA-C  •  pantoprazole (PROTONIX) EC tablet 40 mg, 40 mg, Oral, Early Morning, Maxine Hall MD, 40 mg at 10/23/22 0501  •  pneumococcal 23-valent polysaccharide vaccine (PNEUMOVAX-23) injection 0 5 mL, 0 5 mL, Subcutaneous, Prior to discharge, hospitals Frank Ventura DO  •  scopolamine (TRANSDERM-SCOP) 1 mg/3 days TD 72 hr patch 1 patch, 1 patch, Transdermal, Q72H PRN, All Sauer MD, 1 patch at 10/19/22 1837  •  sotalol (BETAPACE) tablet 80 mg, 80 mg, Oral, Daily, Jeffrey Garcia MD, 80 mg at 10/22/22 0806  •  warfarin (COUMADIN) tablet 4 mg, 4 mg, Oral, Daily (warfarin), Rex Reed MD, 4 mg at 10/22/22 1812    Invasive Devices:        Lab Results:   Results from last 7 days   Lab Units 10/23/22  0502 10/22/22  0529 10/21/22  0920 10/20/22  0449 10/19/22  0545 10/18/22  1847   WBC Thousand/uL 6 79 5 97 5 85 6 08 7 85 7 94   HEMOGLOBIN g/dL 8 5* 8 4* 8 5* 8 9* 9 2* 9 9*   HEMATOCRIT % 26 7* 26 9* 27 2* 28 5* 29 6* 31 5*   PLATELETS Thousands/uL 255 261 249 246 251 262   POTASSIUM mmol/L 3 7 3 4* 3 5 3 5 3 7 4 2   CHLORIDE mmol/L 103 99 103 104 103 98   CO2 mmol/L 23 25 25 27 27 28   BUN mg/dL 47* 48* 46* 47* 50* 54*   CREATININE mg/dL 2 51* 2 67* 2 47* 1 80* 1 93* 1 92*   CALCIUM mg/dL 9 0 8 9 8 9 9 0 9 3 9 7   MAGNESIUM mg/dL  --   --   --   --  1 8* 1 8*   PHOSPHORUS mg/dL  --   --   --  5 0* 5 0*  --    ALK PHOS U/L 149*  --  176* 204* 80 99   ALT U/L <3*  --  15 94* 49 61*   AST U/L 49*  --  112* 221* 49* 68*       Previous work up:         Portions of the record may have been created with voice recognition software  Occasional wrong word or "sound a like" substitutions may have occurred due to the inherent limitations of voice recognition software  Read the chart carefully and recognize, using context, where substitutions have occurred  If you have any questions, please contact the dictating provider

## 2022-10-23 NOTE — ASSESSMENT & PLAN NOTE
Lab Results   Component Value Date    EGFR 22 10/23/2022    EGFR 20 10/22/2022    EGFR 22 10/21/2022    CREATININE 2 51 (H) 10/23/2022    CREATININE 2 67 (H) 10/22/2022    CREATININE 2 47 (H) 10/21/2022     Appears close to baseline of 1 6-1 9  Continue to trend daily BMP    Torsemide was held since admission

## 2022-10-23 NOTE — ASSESSMENT & PLAN NOTE
Wt Readings from Last 3 Encounters:   10/23/22 (!) 138 kg (303 lb 6 4 oz)   09/26/22 (!) 139 kg (307 lb)   09/20/22 (!) 145 kg (319 lb)     Last echo LVEF 60%, by prostatic LD valve, mild to moderate mitral regurgitation, severe pulmonary hypertension  Without shortness of breath or symptoms suggestive of failure  With lower extremity edema  On torsemide 60 mg daily that was held since admission  Oxygen saturation 93- 96 % at room air, patient denied shortness of breath  Status post + 6 3 L since admission  Received 40 mg IV Lasix today 1 time per Nephrology    Strict intake output  Monitor volume status  Daily weights

## 2022-10-23 NOTE — PROGRESS NOTES
[facility]  Progress Note - Nichole Gallegos 1935, 81 y o  male MRN: [id number]  Unit/Bed#: W -01 Encounter: [id number]  Primary Care Provider: Paul Rehman DO   Date and time admitted to hospital: 10/18/2022  5:38 PM    Acute kidney injury [facility])  Assessment & Plan  Recent Labs     10/21/22  0920 10/22/22  0529 10/23/22  0502   CREATININE 2 47* 2 67* 2 51*   EGFR 22 20 22     Estimated Creatinine Clearance: 30 8 mL/min (A) (by C-G formula based on SCr of 2 51 mg/dL (H))  Nephrology on board, appreciate recommendations  Status post 40 IV Lasix today  Bladder scans/PVRs  Intake output  avoid nephrotoxins and hypotension      * Acute cholecystitis  Assessment & Plan  Plan:  · IR tube exchanged on 10/19  · INR remains elevated today, status post 3 units FFP; S/p 2 5 mg of vitamin K 2 since admission  · Continue Ancef and Flagyl per primary team she  · Goal INR 2-3    Bacteremia  Assessment & Plan  · 1/2 blood culture showed g negative rods bacteremia  · Repeated blood culture showed no growth at 48 hours  · ID on board  · Continue cefazolin and metronidazole    Hypomagnesemia  Assessment & Plan  Monitor    Chronic deep vein thrombosis (DVT) (HCC)  Assessment & Plan  History lower extremity DVT in 2015  Goal INR 2-3    OBINNA (obstructive sleep apnea)  Assessment & Plan  Continue CPAP    Diastolic congestive heart failure (HCC)  Assessment & Plan  Wt Readings from Last 3 Encounters:   10/23/22 (!) 138 kg (303 lb 6 4 oz)   09/26/22 (!) 139 kg (307 lb)   09/20/22 (!) 145 kg (319 lb)     Last echo LVEF 60%, by prostatic LD valve, mild to moderate mitral regurgitation, severe pulmonary hypertension  Without shortness of breath or symptoms suggestive of failure    With lower extremity edema  On torsemide 60 mg daily that was held since admission  Oxygen saturation 93- 96 % at room air, patient denied shortness of breath  Status post + 6 3 L since admission  Received 40 mg IV Lasix today 1 time per Nephrology  Strict intake output  Monitor volume status  Daily weights      Stage 3a chronic kidney disease with elevated creatinine  Assessment & Plan  Lab Results   Component Value Date    EGFR 22 10/23/2022    EGFR 20 10/22/2022    EGFR 22 10/21/2022    CREATININE 2 51 (H) 10/23/2022    CREATININE 2 67 (H) 10/22/2022    CREATININE 2 47 (H) 10/21/2022     Appears close to baseline of 1 6-1 9  Continue to trend daily BMP  Torsemide was held since admission    Paroxysmal atrial fibrillation [facility])  Assessment & Plan  Initially warfarin was held due to supratherapeutic INR, INR normalized and the warfarin was resumed yesterday  Continue sotalol at reduced dose of 80 mg due to renal function  Monitor INR    Anemia  Assessment & Plan  Recent Labs     10/21/22  0920 10/22/22  0529 10/23/22  0502   HGB 8 5* 8 4* 8 5*     Appears chronic without signs of bleeding outside of the recent tube exchange    Type 2 diabetes mellitus with diabetic neuropathy, without long-term current use of insulin [facility])  Assessment & Plan  Lab Results   Component Value Date    HGBA1C 6 3 (H) 03/01/2022       Recent Labs     10/21/22  2018 10/22/22  0737 10/22/22  1109 10/22/22  1509   POCGLU 147* 137 160* 132       Plan:  Continue insulin sliding scale with hypoglycemia protocol  Carb controlled diet          VTE Pharmacologic Prophylaxis: VTE Score: 7 High Risk (Score >/= 5) - Pharmacological DVT Prophylaxis Ordered: warfarin (Coumadin)  Sequential Compression Devices Ordered  Patient Centered Rounds: I performed bedside rounds with nursing staff today  Discussions with Specialists or Other Care Team Provider:     Education and Discussions with Family / Patient: per primary team       Current Length of Stay: 5 day(s)  Current Patient Status: Inpatient   Discharge Plan: Anticipate discharge in 24-48 hrs to home  Code Status: Prior    Subjective:   Patient doing fine  Said he has mild sob with exertion intermittent   Had no other complaints  Wants to go home     Objective:     Vitals:   Temp (24hrs), Av 1 °F (36 7 °C), Min:97 5 °F (36 4 °C), Max:98 6 °F (37 °C)    Temp:  [97 5 °F (36 4 °C)-98 6 °F (37 °C)] 98 3 °F (36 8 °C)  HR:  [54-57] 57  Resp:  [16-18] 18  BP: (117-127)/(59-96) 127/96  SpO2:  [95 %-97 %] 97 %  Body mass index is 40 03 kg/m²  Input and Output Summary (last 24 hours): Intake/Output Summary (Last 24 hours) at 10/23/2022 1535  Last data filed at 10/23/2022 1533  Gross per 24 hour   Intake 1575 ml   Output 650 ml   Net 925 ml       Physical Exam:   Physical Exam  Vitals reviewed  Constitutional:       General: He is not in acute distress  Appearance: He is obese  He is not diaphoretic  HENT:      Head: Normocephalic  Eyes:      General: No scleral icterus  Right eye: No discharge  Left eye: No discharge  Cardiovascular:      Rate and Rhythm: Normal rate  Rhythm irregular  Heart sounds: Murmur heard  No gallop  Pulmonary:      Effort: No respiratory distress  Breath sounds: Normal breath sounds  No wheezing, rhonchi or rales  Abdominal:      General: There is no distension  Palpations: Abdomen is soft  Tenderness: There is no abdominal tenderness  There is no guarding  Musculoskeletal:      Right lower leg: Edema present  Left lower leg: Edema present  Skin:     General: Skin is warm  Neurological:      Mental Status: He is alert and oriented to person, place, and time  Psychiatric:         Mood and Affect: Mood normal          Thought Content:  Thought content normal          Judgment: Judgment normal           Additional Data:     Labs:  Results from last 7 days   Lab Units 10/23/22  0502   WBC Thousand/uL 6 79   HEMOGLOBIN g/dL 8 5*   HEMATOCRIT % 26 7*   PLATELETS Thousands/uL 255   NEUTROS PCT % 71   LYMPHS PCT % 15   MONOS PCT % 9   EOS PCT % 4     Results from last 7 days   Lab Units 10/23/22  0502   SODIUM mmol/L 137   POTASSIUM mmol/L 3  7   CHLORIDE mmol/L 103   CO2 mmol/L 23   BUN mg/dL 47*   CREATININE mg/dL 2 51*   ANION GAP mmol/L 11   CALCIUM mg/dL 9 0   ALBUMIN g/dL 3 6   TOTAL BILIRUBIN mg/dL 1 33*   ALK PHOS U/L 149*   ALT U/L <3*   AST U/L 49*   GLUCOSE RANDOM mg/dL 109     Results from last 7 days   Lab Units 10/23/22  0502   INR  1 98*     Results from last 7 days   Lab Units 10/23/22  1138 10/23/22  0717 10/23/22  0625 10/22/22  2049 10/22/22  1509 10/22/22  1109 10/22/22  0737 10/21/22  2018 10/21/22  1627 10/21/22  1552 10/21/22  1113 10/21/22  0734   POC GLUCOSE mg/dl 145* 109 120 107 132 160* 137 147* 114 129 161* 134         Results from last 7 days   Lab Units 10/18/22  1847   LACTIC ACID mmol/L 1 2   PROCALCITONIN ng/ml 0 10       Lines/Drains:  Invasive Devices  Report    Peripheral Intravenous Line  Duration           Peripheral IV 10/18/22 Right Antecubital 4 days    Peripheral IV 10/22/22 Left;Ventral (anterior) Forearm <1 day          Drain  Duration           Cholecystostomy Tube RUQ 3 days                      Imaging: No pertinent imaging reviewed  Recent Cultures (last 7 days):   Results from last 7 days   Lab Units 10/21/22  0912 10/18/22  1926 10/18/22  1847   BLOOD CULTURE  No Growth at 48 hrs  No Growth at 48 hrs  Diphtheroids No Growth After 4 Days     GRAM STAIN RESULT   --  Gram positive rods*  --        Last 24 Hours Medication List:   Current Facility-Administered Medications   Medication Dose Route Frequency Provider Last Rate   • acetaminophen  975 mg Oral Q8H [address] Theresa Champagne MD     • albuterol  2 puff Inhalation Q4H PRN Theresa Champagne MD     • atorvastatin  40 mg Oral After Marck Hsu MD     • cefazolin  1,000 mg Intravenous Rosamaria Arriola MD 1,000 mg (10/23/22 0750)   • HYDROmorphone  0 2 mg Intravenous Q3H PRN Yecenia Blanco PA-C     • influenza vaccine  0 7 mL Intramuscular Prior to discharge Lorena Ventura DO     • insulin lispro  4-20 Units Subcutaneous 4x Daily [facility]) Emiliana Ventura DO     • metroNIDAZOLE  500 mg Oral Q8H Yfn Mustafa MD     • oxyCODONE  2 5 mg Oral Q6H PRN Yecenia Blanco PA-C     • oxyCODONE  5 mg Oral Q6H PRN Yecenia Blanco PA-C     • pantoprazole  40 mg Oral Early Morning Maxine Hall MD     • pneumococcal 23-valent polysaccharide vaccine  0 5 mL Subcutaneous Prior to discharge Emiliana Ventura DO     • scopolamine  1 patch Transdermal Q72H PRN Pedro De La Vega MD     • sotalol  80 mg Oral Daily Adina Bloch, MD     • warfarin  4 mg Oral Daily (warfarin) Tony Mckeon MD          Today, Patient Was Seen By: Conner London    **Please Note: This note may have been constructed using a voice recognition system  **

## 2022-10-23 NOTE — PROGRESS NOTES
Progress Note - General Surgery   Nichole Gallegos 81 y o  male MRN: [id number]  Unit/Bed#: W -01 Encounter: [id number]    Assessment:  81 M with Hx cholecystitis s/p percutaneous cholecystostomy on 9/1, p/w obstructed/displaced perc jeny tube, replaced 10/19 by IR  Hospitalization c/b supratherapeutic INR and REMA  VS:  AVSS, room air  UOP:  575 cc recorded plus multiple unmeasured voids  Perc Jeny:  50 cc, bilious    AM Labs:  Pending    10/21 Repeat Blood Cx: NGTD    Plan:  -diet as tolerated  -maintain percutaneous cholecystostomy tube, record output  -daily INR monitoring, continue warfarin, Goal INR 2-3  -trend Cr daily, nephrology input appreciated for REMA on CKD  -continue IV ancef/Flagyl, ID input appreciated, plan to d/ on Augmentin for total of 10d, thru 10/28  -oral augmentin dose adjustment required on d/c  -f/u final repeat blood culture results, suspect 1/2 GPR growth due to contaminant at this time  -OOB, ambulate  -strict I/O recording  -PT/OT eval  -disposition planning  -will require 2x weekly INR checks with PCP upon discharge     Subjective/Objective       Subjective:  No new complaints this a m  Objective:     Blood pressure 134/62, pulse 63, temperature 98 7 °F (37 1 °C), resp  rate 16, weight (!) 138 kg (305 lb 3 2 oz), SpO2 97 %  ,Body mass index is 40 27 kg/m²        Intake/Output Summary (Last 24 hours) at 10/24/2022 0604  Last data filed at 10/24/2022 0301  Gross per 24 hour   Intake 1260 ml   Output 575 ml   Net 685 ml       Invasive Devices  Report    Peripheral Intravenous Line  Duration           Peripheral IV 10/22/22 Left;Ventral (anterior) Forearm 1 day          Drain  Duration           Cholecystostomy Tube RUQ 4 days                Physical Exam:     General:  No acute distress, morbidly obese  HEENT:  Normocephalic, atraumatic  Cardiovascular: Regular rate, regular rhythm  Respiratory:  No distress, room air  Abdomen:  Soft, minimally tender drain insertion site, nondistended, percutaneous cholecystostomy with bilious effluent  Extremities:  No clubbing, cyanosis, trace pedal edema b/l  Neuro:  AAO x3  Skin:  Warm, dry, no jaundice      Lab, Imaging and other studies:  Pending  VTE Pharmacologic Prophylaxis: Warfarin (Coumadin)  VTE Mechanical Prophylaxis: sequential compression device

## 2022-10-24 ENCOUNTER — HOME HEALTH ADMISSION (OUTPATIENT)
Dept: HOME HEALTH SERVICES | Facility: HOME HEALTHCARE | Age: 87
End: 2022-10-24

## 2022-10-24 VITALS
DIASTOLIC BLOOD PRESSURE: 64 MMHG | RESPIRATION RATE: 16 BRPM | OXYGEN SATURATION: 97 % | BODY MASS INDEX: 40.27 KG/M2 | HEART RATE: 60 BPM | WEIGHT: 305.2 LBS | SYSTOLIC BLOOD PRESSURE: 127 MMHG | TEMPERATURE: 99.2 F

## 2022-10-24 PROBLEM — E83.42 HYPOMAGNESEMIA: Status: RESOLVED | Noted: 2022-10-19 | Resolved: 2022-10-24

## 2022-10-24 LAB
ANION GAP SERPL CALCULATED.3IONS-SCNC: 10 MMOL/L (ref 4–13)
ATRIAL RATE: 56 BPM
ATRIAL RATE: 60 BPM
BACTERIA BLD CULT: NORMAL
BUN SERPL-MCNC: 44 MG/DL (ref 5–25)
CALCIUM SERPL-MCNC: 9.1 MG/DL (ref 8.4–10.2)
CHLORIDE SERPL-SCNC: 104 MMOL/L (ref 96–108)
CO2 SERPL-SCNC: 24 MMOL/L (ref 21–32)
CREAT SERPL-MCNC: 2.08 MG/DL (ref 0.6–1.3)
GFR SERPL CREATININE-BSD FRML MDRD: 27 ML/MIN/1.73SQ M
GLUCOSE SERPL-MCNC: 114 MG/DL (ref 65–140)
GLUCOSE SERPL-MCNC: 119 MG/DL (ref 65–140)
GLUCOSE SERPL-MCNC: 132 MG/DL (ref 65–140)
INR PPP: 2.06 (ref 0.84–1.19)
P AXIS: 59 DEGREES
POTASSIUM SERPL-SCNC: 3.8 MMOL/L (ref 3.5–5.3)
PR INTERVAL: 336 MS
PROTHROMBIN TIME: 23.4 SECONDS (ref 11.6–14.5)
QRS AXIS: -45 DEGREES
QRS AXIS: -45 DEGREES
QRSD INTERVAL: 176 MS
QRSD INTERVAL: 178 MS
QT INTERVAL: 542 MS
QT INTERVAL: 546 MS
QTC INTERVAL: 523 MS
QTC INTERVAL: 546 MS
SODIUM SERPL-SCNC: 138 MMOL/L (ref 135–147)
T WAVE AXIS: 69 DEGREES
T WAVE AXIS: 69 DEGREES
VENTRICULAR RATE: 56 BPM
VENTRICULAR RATE: 60 BPM

## 2022-10-24 PROCEDURE — 80048 BASIC METABOLIC PNL TOTAL CA: CPT | Performed by: STUDENT IN AN ORGANIZED HEALTH CARE EDUCATION/TRAINING PROGRAM

## 2022-10-24 PROCEDURE — 82948 REAGENT STRIP/BLOOD GLUCOSE: CPT

## 2022-10-24 PROCEDURE — 97163 PT EVAL HIGH COMPLEX 45 MIN: CPT

## 2022-10-24 PROCEDURE — 99232 SBSQ HOSP IP/OBS MODERATE 35: CPT | Performed by: INTERNAL MEDICINE

## 2022-10-24 PROCEDURE — 93010 ELECTROCARDIOGRAM REPORT: CPT | Performed by: INTERNAL MEDICINE

## 2022-10-24 PROCEDURE — 93005 ELECTROCARDIOGRAM TRACING: CPT

## 2022-10-24 PROCEDURE — NC001 PR NO CHARGE: Performed by: SURGERY

## 2022-10-24 PROCEDURE — 85610 PROTHROMBIN TIME: CPT | Performed by: STUDENT IN AN ORGANIZED HEALTH CARE EDUCATION/TRAINING PROGRAM

## 2022-10-24 PROCEDURE — 99232 SBSQ HOSP IP/OBS MODERATE 35: CPT | Performed by: SURGERY

## 2022-10-24 RX ORDER — SOTALOL HYDROCHLORIDE 80 MG/1
80 TABLET ORAL DAILY
Refills: 0 | Status: ON HOLD
Start: 2022-10-24

## 2022-10-24 RX ORDER — ACETAMINOPHEN 325 MG/1
975 TABLET ORAL EVERY 8 HOURS SCHEDULED
Refills: 0 | Status: ON HOLD | COMMUNITY
Start: 2022-10-24

## 2022-10-24 RX ORDER — AMOXICILLIN AND CLAVULANATE POTASSIUM 875; 125 MG/1; MG/1
1 TABLET, FILM COATED ORAL EVERY 12 HOURS SCHEDULED
Qty: 8 TABLET | Refills: 0 | Status: SHIPPED | OUTPATIENT
Start: 2022-10-24 | End: 2022-10-28

## 2022-10-24 RX ORDER — AMOXICILLIN AND CLAVULANATE POTASSIUM 875; 125 MG/1; MG/1
1 TABLET, FILM COATED ORAL EVERY 12 HOURS SCHEDULED
Qty: 8 TABLET | Refills: 0 | Status: SHIPPED | OUTPATIENT
Start: 2022-10-24 | End: 2022-10-24 | Stop reason: SDUPTHER

## 2022-10-24 RX ORDER — WARFARIN SODIUM 2 MG/1
4 TABLET ORAL
Qty: 14 TABLET | Refills: 0 | Status: ON HOLD
Start: 2022-10-24

## 2022-10-24 RX ORDER — SODIUM CHLORIDE 9 MG/ML
10 INJECTION INTRAVENOUS DAILY
Qty: 300 ML | Refills: 1 | Status: ON HOLD | OUTPATIENT
Start: 2022-10-24 | End: 2022-12-23

## 2022-10-24 RX ORDER — POTASSIUM CHLORIDE 20 MEQ/1
20 TABLET, EXTENDED RELEASE ORAL ONCE
Status: COMPLETED | OUTPATIENT
Start: 2022-10-24 | End: 2022-10-24

## 2022-10-24 RX ADMIN — PANTOPRAZOLE SODIUM 40 MG: 40 TABLET, DELAYED RELEASE ORAL at 05:09

## 2022-10-24 RX ADMIN — METRONIDAZOLE 500 MG: 500 TABLET ORAL at 05:09

## 2022-10-24 RX ADMIN — POTASSIUM CHLORIDE 20 MEQ: 1500 TABLET, EXTENDED RELEASE ORAL at 07:19

## 2022-10-24 RX ADMIN — SOTALOL HYDROCHLORIDE 80 MG: 80 TABLET ORAL at 08:27

## 2022-10-24 RX ADMIN — ACETAMINOPHEN 975 MG: 325 TABLET, FILM COATED ORAL at 05:09

## 2022-10-24 RX ADMIN — CEFAZOLIN SODIUM 1000 MG: 1 SOLUTION INTRAVENOUS at 08:27

## 2022-10-24 NOTE — ASSESSMENT & PLAN NOTE
Wt Readings from Last 3 Encounters:   10/24/22 (!) 138 kg (305 lb 3 2 oz)   09/26/22 (!) 139 kg (307 lb)   09/20/22 (!) 145 kg (319 lb)     Last echo LVEF 60%, by prostatic LD valve, mild to moderate mitral regurgitation, severe pulmonary hypertension  Without shortness of breath or symptoms suggestive of failure  With lower extremity edema  Okay to resume torsemide 40 mg a m  And 20 mg p m   Per Nephrology  Follow-up with nephrology and cardiology

## 2022-10-24 NOTE — ASSESSMENT & PLAN NOTE
· 1/2 blood culture showed g negative rods bacteremia  · Repeated blood culture showed no growth at 48 hours  · ID on board  · Continue antibiotics per ID recommendations

## 2022-10-24 NOTE — ASSESSMENT & PLAN NOTE
Lab Results   Component Value Date    HGBA1C 6 3 (H) 03/01/2022       Recent Labs     10/23/22  1522 10/23/22  2114 10/24/22  0747 10/24/22  1228   POCGLU 113 118 119 132       Discontinue metformin on discharge  Follow-up with PCP

## 2022-10-24 NOTE — PLAN OF CARE
Problem: Potential for Falls  Goal: Patient will remain free of falls  Description: INTERVENTIONS:  - Educate patient/family on patient safety including physical limitations  - Instruct patient to call for assistance with activity   - Consult OT/PT to assist with strengthening/mobility   - Keep Call bell within reach  - Keep bed low and locked with side rails adjusted as appropriate  - Keep care items and personal belongings within reach  - Initiate and maintain comfort rounds  - Make Fall Risk Sign visible to staff  - Offer Toileting every 2 Hours, in advance of need  - Initiate/Maintain alarm  - Obtain necessary fall risk management equipment:   - Apply yellow socks and bracelet for high fall risk patients  - Consider moving patient to room near nurses station  Outcome: Progressing     Problem: MOBILITY - ADULT  Goal: Maintain or return to baseline ADL function  Description: INTERVENTIONS:  -  Assess patient's ability to carry out ADLs; assess patient's baseline for ADL function and identify physical deficits which impact ability to perform ADLs (bathing, care of mouth/teeth, toileting, grooming, dressing, etc )  - Assess/evaluate cause of self-care deficits   - Assess range of motion  - Assess patient's mobility; develop plan if impaired  - Assess patient's need for assistive devices and provide as appropriate  - Encourage maximum independence but intervene and supervise when necessary  - Involve family in performance of ADLs  - Assess for home care needs following discharge   - Consider OT consult to assist with ADL evaluation and planning for discharge  - Provide patient education as appropriate  Outcome: Progressing  Goal: Maintains/Returns to pre admission functional level  Description: INTERVENTIONS:  - Perform BMAT or MOVE assessment daily    - Set and communicate daily mobility goal to care team and patient/family/caregiver     - Collaborate with rehabilitation services on mobility goals if consulted  - Perform Range of Motion 3 times a day  - Reposition patient every 2 hours    - Dangle patient 2 times a day  - Stand patient 2 times a day  - Ambulate patient 2 times a day  - Out of bed to chair 2 times a day   - Out of bed for meals 2 times a day  - Out of bed for toileting  - Record patient progress and toleration of activity level   Outcome: Progressing

## 2022-10-24 NOTE — ASSESSMENT & PLAN NOTE
Lab Results   Component Value Date    EGFR 27 10/24/2022    EGFR 22 10/23/2022    EGFR 20 10/22/2022    CREATININE 2 08 (H) 10/24/2022    CREATININE 2 51 (H) 10/23/2022    CREATININE 2 67 (H) 10/22/2022     Appears close to baseline of 1 6-1 9    BMP in 1 week

## 2022-10-24 NOTE — CASE MANAGEMENT
Case Management Assessment & Discharge Planning Note    Patient name Kishan Connelly  Location W /W -20 MRN [id number]  : 1935 Date 10/24/2022       Current Admission Date: 10/18/2022  Current Admission Diagnosis:Acute cholecystitis   Patient Active Problem List    Diagnosis Date Noted   • Bacteremia 10/23/2022   • Hypomagnesemia 10/19/2022   • Acute cholecystitis 2022   • Benign prostatic hyperplasia with lower urinary tract symptoms 2022   • OBINNA (obstructive sleep apnea)    • Stage 3a chronic kidney disease with elevated creatinine 2022   • Acute kidney injury ([address] ) 2021   • Osteoarthritis, knee 11/15/2021   • Status post right knee replacement 11/15/2021   • Mixed hyperlipidemia 2021   • Swelling of limb 2021   • Chronic deep vein thrombosis (DVT) ([facility] ) 09/10/2020   • Secondary lymphedema 2018   • Venous ulcer of ankle, right ([facility] ) 2018   • S/P AVR 2018   • DVT (deep venous thrombosis) ([facility] ) 2018   • Presence of implantable cardioverter-defibrillator (ICD) 2018   • Hematuria, gross 2017   • Chronic bilateral low back pain without sciatica 2017   • Compression fracture of L4 lumbar vertebra 2017   • BPH (benign prostatic hyperplasia) 2017   • Obesity, unspecified obesity severity, unspecified obesity type 2017   • Hydrocele 2017   • Chronic anticoagulation 2017   • Anemia 2017   • Renal cyst 2017   • Urinary tract infection, acute 2017   • Epididymitis 2017   • Cellulitis of scrotum 2017   • Facial cellulitis 2016   • Dental abscess 2016   • 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 2015   • Ventricular tachycardia 04/15/2014   • Paroxysmal atrial fibrillation ([address] ) 2014   • Edema 92/10/9632   • Diastolic congestive heart failure ([address] ) 2014   • Endocarditis 2014   • Left knee pain 2014   • Other disorder of calcium metabolism 03/14/2014   • Aortic stenosis 10/17/2013   • Type 2 diabetes mellitus with diabetic neuropathy, without long-term current use of insulin ([facility] ) 08/19/2013   • Peripheral venous insufficiency 08/19/2013   • Cardiomegaly 08/19/2013   • PVD (peripheral vascular disease) ([facility] ) 04/19/2013   • Dyslipidemia 04/19/2013      LOS (days): 6  Geometric Mean LOS (GMLOS) (days): 3 00  Days to GMLOS:-2 5     OBJECTIVE:    Risk of Unplanned Readmission Score: 24 64         Current admission status: Inpatient       Preferred Pharmacy:   [address] [facility] [address]  Phone: [phone] Fax: [phone] [address] ([address]) [facility], [address] [facility] [address] [facility] [address]  [address] [place] [address]  Phone: [phone] Fax: [phone]    Primary Care Provider: Erick Chun DO    Primary Insurance: MEDICARE  Secondary Insurance: [employer] PROGRAM    ASSESSMENT:  Active Health Care Proxies    There are no active Health Care Proxies on file                   Readmission Root Cause  30 Day Readmission: No    Patient Information  Admitted from[de-identified] Home  Mental Status: Alert  During Assessment patient was accompanied by: Spouse  Assessment information provided by[de-identified] Patient  Primary Caregiver: Self  Support Systems: Self, Spouse/significant other, Daughter, Friend, [address] of Residence: [address] [place] [address] do you live in?: [address] [facility]: Lives w/ Spouse/significant other    Activities of Daily Living Prior to Admission  Functional Status: Independent  Does patient currently own DME?: Yes  What DME does the patient currently own?: [employer] Burkitt  Does patient have a history of [address]?: Yes  Does patient currently have [address]?: No                            DISCHARGE DETAILS:    Discharge planning discussed with[de-identified] Pt and spouse  Freedom of Choice: Yes  Comments - Freedom of Choice: Pt wishes to use [facility] for his home physical therapy  CM contacted family/caregiver?: Yes  Were Treatment Team discharge recommendations reviewed with patient/caregiver?: Yes  Did patient/caregiver verbalize understanding of patient care needs?: Yes  Were patient/caregiver advised of the risks associated with not following Treatment Team discharge recommendations?: Yes    Contacts  Patient Contacts: Patient and spouse-Paloma  Relationship to Patient[de-identified] Family  Contact Method: In Person  Reason/Outcome: Discharge Planning, Referral    Requested [address]         Is the patient interested in Cheryle Sullivan at discharge?: Yes  [facility] [facility]ogg requested[de-identified] Physical [address] Name[de-identified] [address] [facility] Provider[de-identified] PCP  Home Health Services Needed[de-identified] Evaluate Functional Status and Safety, Gait/ADL Training, Strengthening/Theraputic Exercises to Improve Function  Homebound Criteria Met[de-identified] Requires the Assistance of Another Person for Safe Ambulation or to Leave the Home, Uses an Assist Device (i e  cane, walker, etc)  Supporting Clincal Findings[de-identified] Fatigues Easliy in [employer], Limited Endurance    DME Referral Provided  Referral made for DME?: No    Other Referral/Resources/Interventions Provided:  Interventions: [facility]  Referral Comments: CM placed a referral via [address] for home physical therapy for the pt  CM accepted the availability of [facility] for pt's home PT           Treatment Team Recommendation: Home with [address] [employer]  Discharge Destination Plan[de-identified] Home with [place] at Discharge : Family

## 2022-10-24 NOTE — OCCUPATIONAL THERAPY NOTE
Occupational Therapy Screen Note         Patient Name: Mando GOMEZP Date: 10/24/2022           10/24/22 1116   OT Last Visit   OT Visit Date 10/24/22   Note Type   Note type Screen   Additional Comments OT orders received  Chart review performed  Based on conversation with RN Quincy Rodas and PT Jerome Mancia, pt appears to be performing at functional baseline  Pt has been (I) in room and getting to/from bathroom without assistance  Pt does not demonstrate need for skilled OT at this time  Pt will not be seen further by OT services  DC OT orders  If pt's functional status declines please re-consult         Sharda Bliss MS, OTR/L

## 2022-10-24 NOTE — ASSESSMENT & PLAN NOTE
Recent Labs     10/22/22  0529 10/23/22  0502 10/24/22  0523   CREATININE 2 67* 2 51* 2 08*   EGFR 20 22 27     Estimated Creatinine Clearance: 37 1 mL/min (A) (by C-G formula based on SCr of 2 08 mg/dL (H))  Nephrology on board, appreciate recommendations  Creatinine improving, cleared by Nephrology for discharge    Resume torsemide and have a repeat BMP in 1 week

## 2022-10-24 NOTE — ASSESSMENT & PLAN NOTE
Initially warfarin was held due to supratherapeutic INR, INR normalized and the warfarin was resumed  Continue sotalol at reduced dose of 80 mg due to renal function and follow-up with cardiology  Continue warfarin and follow up with primary care physician for INR

## 2022-10-24 NOTE — PROGRESS NOTES
[address] [facility] NOTE   Max Brood 81 y o  male MRN: [id number]  Unit/Bed#: W -01 Encounter: [id number]  Reason for Consult: REMA    ASSESSMENT and PLAN:  1  Acute kidney injury  · Admitted with a creatinine of 1 92 on October 18, 2022  · Peak creatinine 2 67 on October 22  · Etiology felt to be ATN due to contrast nephropathy  · UA is bland  No hydronephrosis on CT imaging  · Renal function improving  · Creatinine down to 2 08 today  2  Chronic kidney disease, stage III:  · Baseline creatinine around 1 3-1 6  · No prior renal follow-up  3  Lower extremity edema:  · Prior to admission, he was on torsemide 40 mg in AM and 20 mg in PM    · May restart this upon discharge  4  Anemia:  · Hemoglobin low but stable  · SPEP, free light chains, and UPEP are pending  5  Acute cholecystitis  · S/p cholecystostomy tube replacement on 10/19/22  · On abx  DISPOSITION:  · Improving renal function  · Stable for discharge from renal standpoint  · Okay to restart torsemide upon discharge  · Would hold off on metformin on discharge  · Check BMP in 1 week  · Will arrange for renal follow-up  Discussed with medical resident  SUBJECTIVE / 24H INTERVAL HISTORY:  Feels good  No CP or SOB  OBJECTIVE:  Current Weight: Weight - Scale: (!) 138 kg (305 lb 3 2 oz)  Vitals:    10/23/22 1915 10/23/22 2117 10/24/22 0557 10/24/22 0750   BP:  134/62  127/64   BP Location:    Right arm   Pulse:  63  60   Resp:  16  16   Temp:  98 7 °F (37 1 °C)  99 2 °F (37 3 °C)   TempSrc:    Oral   SpO2: 95% 97%  97%   Weight:   (!) 138 kg (305 lb 3 2 oz)        Intake/Output Summary (Last 24 hours) at 10/24/2022 1104  Last data filed at 10/24/2022 0901  Gross per 24 hour   Intake 1550 ml   Output 400 ml   Net 1150 ml     General: conscious, cooperative, no distress  Skin: dry  Eyes: pink conjunctivae  ENT: moist mucous membranes  Respiratory: equal chest expansion, decreased in bases     Cardiovascular: distinct heart sounds, normal rate, regular rhythm  Abdomen: soft, non tender, non distended, normal bowel sounds  Extremities: (+) bilateral lower extremity edema  Genitourinary: no alfred catheter  Neuro: awake, alert     Psych: appropriate affect    Medications:    Current Facility-Administered Medications:   •  acetaminophen (TYLENOL) tablet 975 mg, 975 mg, Oral, Q8H [facility], Torin Barajas MD, 975 mg at 10/24/22 0509  •  albuterol (PROVENTIL HFA,VENTOLIN HFA) inhaler 2 puff, 2 puff, Inhalation, Q4H PRN, Torin Barajas MD  •  atorvastatin (LIPITOR) tablet 40 mg, 40 mg, Oral, After Dinner, Torin Barajas MD, 40 mg at 10/23/22 1738  •  ceFAZolin (ANCEF) IVPB (premix in dextrose) 1,000 mg 50 mL, 1,000 mg, Intravenous, Q8H, Juan Carlos Morrissey MD, Stopped at 10/24/22 0901  •  HYDROmorphone HCl (DILAUDID) injection 0 2 mg, 0 2 mg, Intravenous, Q3H PRN, Yecenia Blanco PA-C  •  influenza vaccine, high-dose (FLUZONE HIGH-DOSE) IM injection RAMON 0 7 mL, 0 7 mL, Intramuscular, Prior to discharge, Sim Ventura DO  •  insulin lispro (HumaLOG) 100 units/mL subcutaneous injection 4-20 Units, 4-20 Units, Subcutaneous, 4x Daily (AC & HS), 4 Units at 10/22/22 1245 **AND** Fingerstick Glucose (POCT), , , 4x Daily AC and at bedtime, Endy Ventura DO  •  metroNIDAZOLE (FLAGYL) tablet 500 mg, 500 mg, Oral, Q8H [facility], Juan Carlos Morrissey MD, 500 mg at 10/24/22 0509  •  oxyCODONE (ROXICODONE) IR tablet 2 5 mg, 2 5 mg, Oral, Q6H PRN, Yecenia Blanco PA-C  •  oxyCODONE (ROXICODONE) IR tablet 5 mg, 5 mg, Oral, Q6H PRN, Yecenia Blanco PA-C  •  pantoprazole (PROTONIX) EC tablet 40 mg, 40 mg, Oral, Early Morning, Maxine Hall MD, 40 mg at 10/24/22 2310  •  pneumococcal 23-valent polysaccharide vaccine (PNEUMOVAX-23) injection 0 5 mL, 0 5 mL, Subcutaneous, Prior to discharge, Sim Ventura DO  •  scopolamine (TRANSDERM-SCOP) 1 mg/3 days TD 72 hr patch 1 patch, 1 patch, Transdermal, Q72H PRN, Rossy Ramirez MD, 1 patch at 10/19/22 1837  •  sotalol (BETAPACE) tablet 80 mg, 80 mg, Oral, Daily, Nav Morris MD, 80 mg at 10/24/22 0827  •  warfarin (COUMADIN) tablet 4 mg, 4 mg, Oral, Daily (warfarin), Bob Beard MD, 4 mg at 10/23/22 1738    Laboratory Results:  Results from last 7 days   Lab Units 10/24/22  0523 10/23/22  0502 10/22/22  0529 10/21/22  0920 10/20/22  0449 10/19/22  0545 10/18/22  1847   WBC Thousand/uL  --  6 79 5 97 5 85 6 08 7 85 7 94   HEMOGLOBIN g/dL  --  8 5* 8 4* 8 5* 8 9* 9 2* 9 9*   HEMATOCRIT %  --  26 7* 26 9* 27 2* 28 5* 29 6* 31 5*   PLATELETS Thousands/uL  --  255 261 249 246 251 262   POTASSIUM mmol/L 3 8 3 7 3 4* 3 5 3 5 3 7 4 2   CHLORIDE mmol/L 104 103 99 103 104 103 98   CO2 mmol/L 24 23 25 25 27 27 28   BUN mg/dL 44* 47* 48* 46* 47* 50* 54*   CREATININE mg/dL 2 08* 2 51* 2 67* 2 47* 1 80* 1 93* 1 92*   CALCIUM mg/dL 9 1 9 0 8 9 8 9 9 0 9 3 9 7   MAGNESIUM mg/dL  --   --   --   --   --  1 8* 1 8*   PHOSPHORUS mg/dL  --   --   --   --  5 0* 5 0*  --

## 2022-10-24 NOTE — PLAN OF CARE
Problem: PHYSICAL THERAPY ADULT  Goal: Performs mobility at highest level of function for planned discharge setting  See evaluation for individualized goals  Description: Treatment/Interventions: Functional transfer training, LE strengthening/ROM, Therapeutic exercise, Endurance training, Patient/family training, Equipment eval/education, Bed mobility  Equipment Recommended: Melanie Beyer       See flowsheet documentation for full assessment, interventions and recommendations  10/24/2022 1105 by Cecilia Maynard, PT  Note: Prognosis: Good  Problem List: Decreased strength, Decreased endurance, Impaired balance, Decreased mobility  Assessment: Suzan Sibley is a 81 y o  Male who [facility] on 10/18/2022 due to surgical problem re-evaluation and diagnosis of acute cholecystitis  Orders for PT eval and treat received  Comorbidities affecting pt at time of evaluation include: DM, anemia, CHF, chronic DVT, CKD, osteoarthritis  Personal factors affecting DC include: ambulating w/ assistive device  At baseline, pt mobilizes mod I w/ SPC vs walkers, and w/ 0 fall(s) in the previous 6 months  Upon evaluation, pt presents w/ the following deficits: weakness, impaired balance, decreased endurance and gait deviations  Pt currently requires supervision for transfers and supervision w/ RW for ambulation  Pt's clinical presentation is unstable/unpredictable due to need for input for mobility technique, abnormal lab values, ongoing medical monitoring/management, and recent drastic decline in mobility compared to baseline  Pt is at an increased risk of falls due to impaired balance  From a PT/mobility standpoint, given the above findings, DC recommendation is: Home w/ HHPT  During current admission, pt will benefit from continued skilled inpatient PT in the acute care setting in order to address the above deficits and to maximize function and mobility prior to DC from acute care          PT Discharge Recommendation: Home with home health rehabilitation    See flowsheet documentation for full assessment

## 2022-10-24 NOTE — ASSESSMENT & PLAN NOTE
Recent Labs     10/22/22  0529 10/23/22  0502   HGB 8 4* 8 5*     Appears chronic without signs of bleeding outside of the recent tube exchange

## 2022-10-24 NOTE — PROGRESS NOTES
[facility]  Progress Note - Ramóne Cue 1935, 81 y o  male MRN: [id number]  Unit/Bed#: W -01 Encounter: [id number]  Primary Care Provider: Laurie Denise DO   Date and time admitted to hospital: 10/18/2022  5:38 PM    Acute kidney injury [facility])  Assessment & Plan  Recent Labs     10/22/22  0529 10/23/22  0502 10/24/22  0523   CREATININE 2 67* 2 51* 2 08*   EGFR 20 22 27     Estimated Creatinine Clearance: 37 1 mL/min (A) (by C-G formula based on SCr of 2 08 mg/dL (H))  Nephrology on board, appreciate recommendations  Creatinine improving, cleared by Nephrology for discharge  Resume torsemide and have a repeat BMP in 1 week      * Acute cholecystitis  Assessment & Plan  Plan:  · IR tube exchanged on 10/19  · INR remains elevated today, status post 3 units FFP; S/p 2 5 mg of vitamin K 2 since admission  · Continue Ancef and Flagyl per primary team she  · Goal INR 2-3    Managed per surgery    Bacteremia  Assessment & Plan  · 1/2 blood culture showed g negative rods bacteremia  · Repeated blood culture showed no growth at 48 hours  · ID on board  · Continue antibiotics per ID recommendations    Chronic deep vein thrombosis (DVT) ([address] )  Assessment & Plan  History lower extremity DVT in 2015  Goal INR 2-3    OBINNA (obstructive sleep apnea)  Assessment & Plan  Continue CPAP    Diastolic congestive heart failure (HCC)  Assessment & Plan  Wt Readings from Last 3 Encounters:   10/24/22 (!) 138 kg (305 lb 3 2 oz)   09/26/22 (!) 139 kg (307 lb)   09/20/22 (!) 145 kg (319 lb)     Last echo LVEF 60%, by prostatic LD valve, mild to moderate mitral regurgitation, severe pulmonary hypertension  Without shortness of breath or symptoms suggestive of failure  With lower extremity edema  Okay to resume torsemide 40 mg a m  And 20 mg p m   Per Nephrology  Follow-up with nephrology and cardiology      Stage 3a chronic kidney disease with elevated creatinine  Assessment & Plan  Lab Results Component Value Date    EGFR 27 10/24/2022    EGFR 22 10/23/2022    EGFR 20 10/22/2022    CREATININE 2 08 (H) 10/24/2022    CREATININE 2 51 (H) 10/23/2022    CREATININE 2 67 (H) 10/22/2022     Appears close to baseline of 1 6-1 9  BMP in 1 week    Paroxysmal atrial fibrillation (HCC)  Assessment & Plan  Initially warfarin was held due to supratherapeutic INR, INR normalized and the warfarin was resumed  Continue sotalol at reduced dose of 80 mg due to renal function and follow-up with cardiology  Continue warfarin and follow up with primary care physician for INR      Anemia  Assessment & Plan  Recent Labs     10/22/22  0529 10/23/22  0502   HGB 8 4* 8 5*     Appears chronic without signs of bleeding outside of the recent tube exchange    Type 2 diabetes mellitus with diabetic neuropathy, without long-term current use of insulin [facility])  Assessment & Plan  Lab Results   Component Value Date    HGBA1C 6 3 (H) 2022       Recent Labs     10/23/22  1522 10/23/22  2114 10/24/22  0747 10/24/22  1228   POCGLU 113 118 119 132       Discontinue metformin on discharge  Follow-up with PCP        VTE Pharmacologic Prophylaxis: VTE Score: 7 High Risk (Score >/= 5) - Pharmacological DVT Prophylaxis Ordered: warfarin (Coumadin)  Sequential Compression Devices Ordered  Patient Centered Rounds: I performed bedside rounds with nursing staff today  Discussions with Specialists or Other Care Team Provider: surgery, nephrology     Education and Discussions with Family / Patient: Updated  (wife) at bedside  Current Length of Stay: 6 day(s)  Current Patient Status: Inpatient   Discharge Plan: per primary     Code Status: Prior    Subjective:   Patient eager to go home today  Denied any pain or shortness of breath      Objective:     Vitals:   Temp (24hrs), Av °F (37 2 °C), Min:98 7 °F (37 1 °C), Max:99 2 °F (37 3 °C)    Temp:  [98 7 °F (37 1 °C)-99 2 °F (37 3 °C)] 99 2 °F (37 3 °C)  HR:  [60-63] 60  Resp: [16] 16  BP: (127-134)/(62-64) 127/64  SpO2:  [95 %-97 %] 97 %  Body mass index is 40 27 kg/m²  Input and Output Summary (last 24 hours): Intake/Output Summary (Last 24 hours) at 10/24/2022 1702  Last data filed at 10/24/2022 0901  Gross per 24 hour   Intake 1250 ml   Output 400 ml   Net 850 ml       Physical Exam:   Physical Exam  Vitals reviewed  Constitutional:       General: He is not in acute distress  Appearance: He is obese  He is not diaphoretic  Eyes:      General: No scleral icterus  Right eye: No discharge  Left eye: No discharge  Cardiovascular:      Rate and Rhythm: Normal rate  Rhythm irregular  Heart sounds: Murmur heard  Pulmonary:      Effort: No respiratory distress  Breath sounds: Normal breath sounds  No wheezing or rales  Abdominal:      General: There is no distension  Palpations: Abdomen is soft  Tenderness: There is no abdominal tenderness  There is no guarding  Musculoskeletal:      Right lower leg: Edema present  Left lower leg: Edema present  Skin:     General: Skin is warm  Neurological:      Mental Status: He is alert and oriented to person, place, and time  Psychiatric:         Mood and Affect: Mood normal          Behavior: Behavior normal          Thought Content:  Thought content normal          Judgment: Judgment normal           Additional Data:     Labs:  Results from last 7 days   Lab Units 10/23/22  0502   WBC Thousand/uL 6 79   HEMOGLOBIN g/dL 8 5*   HEMATOCRIT % 26 7*   PLATELETS Thousands/uL 255   NEUTROS PCT % 71   LYMPHS PCT % 15   MONOS PCT % 9   EOS PCT % 4     Results from last 7 days   Lab Units 10/24/22  0523 10/23/22  0502   SODIUM mmol/L 138 137   POTASSIUM mmol/L 3 8 3 7   CHLORIDE mmol/L 104 103   CO2 mmol/L 24 23   BUN mg/dL 44* 47*   CREATININE mg/dL 2 08* 2 51*   ANION GAP mmol/L 10 11   CALCIUM mg/dL 9 1 9 0   ALBUMIN g/dL  --  3 6   TOTAL BILIRUBIN mg/dL  --  1 33*   ALK PHOS U/L  --  149* ALT U/L  --  <3*   AST U/L  --  49*   GLUCOSE RANDOM mg/dL 114 109     Results from last 7 days   Lab Units 10/24/22  0523   INR  2 06*     Results from last 7 days   Lab Units 10/24/22  1228 10/24/22  0747 10/23/22  2114 10/23/22  1522 10/23/22  1138 10/23/22  0717 10/23/22  0625 10/22/22  2049 10/22/22  1509 10/22/22  1109 10/22/22  0737 10/21/22  2018   POC GLUCOSE mg/dl 132 119 118 113 145* 109 120 107 132 160* 137 147*         Results from last 7 days   Lab Units 10/18/22  1847   LACTIC ACID mmol/L 1 2   PROCALCITONIN ng/ml 0 10       Lines/Drains:  Invasive Devices  Report    Drain  Duration           Cholecystostomy Tube RUQ 4 days                      Imaging: No pertinent imaging reviewed  Recent Cultures (last 7 days):   Results from last 7 days   Lab Units 10/21/22  0912 10/18/22  1926 10/18/22  1847   BLOOD CULTURE  No Growth at 72 hrs  No Growth at 72 hrs  Diphtheroids No Growth After 5 Days  GRAM STAIN RESULT   --  Gram positive rods*  --        Last 24 Hours Medication List:        Today, Patient Was Seen By: Jaiden Ocasio    **Please Note: This note may have been constructed using a voice recognition system  **

## 2022-10-24 NOTE — ASSESSMENT & PLAN NOTE
Plan:  · IR tube exchanged on 10/19  · INR remains elevated today, status post 3 units FFP; S/p 2 5 mg of vitamin K 2 since admission  · Continue Ancef and Flagyl per primary team she  · Goal INR 2-3    Managed per surgery

## 2022-10-24 NOTE — DISCHARGE SUMMARY
Discharge Summary - Aleena Delgado 81 y o  male MRN: [id number]    Unit/Bed#: W -01 Encounter: [id number]    Admission Date:   Admission Orders (From admission, onward)     Ordered        10/18/22 2310  Inpatient Admission  Once                        Admitting Diagnosis: Acute cholecystitis [K81 0]  Bladder pain [R39 89]  PAF (paroxysmal atrial fibrillation) ([address] ) [I48 0]  Supratherapeutic INR [R79 1]  S/P AVR [Z95 2]    HPI: Aleena Delgado is a 81 y o  male with history of AFib on Coumadin, aortic valve replacement, AICD, CHF, HTN, HLD, DVT/PE, , IVCFplacement SCC, BCC, cholecystitis with placement of percutaneous cholecystostomy tube 9/1 who presents with recurrent abdominal pain  Patient was initially seen in September for cholecystitis where he underwent placement of a IR perc cholecystostomy tube on 09/01  Since discharge from the hospital patient has been seen in the office as recently as 9/26, at which time it was agreed that he would receive an interval cholecystectomy after being cleared by Cardiology and allowing time for resolution of the acute inflammatory state      Patient states that within a week of being seen in the office he began experiencing gradually increasing abdominal pain and generalized malaise and energy loss  He has had poor p o  intake since this time, and reports having episodes of diarrhea and subjective fevers  Per patient his wife over the past several days they have had difficulty flushing the drain, and output has decreased to almost nothing  A CT was performed showing stones with the distended gallbladder and inflammation despite placement of the cholecystostomy to, likely related to obstructive cath, as well as probable phlegmon how around the area of the rectus muscle  Physical exam shows moderate diffuse tenderness  Patient is afebrile with normal white count, however he still retains an elevation in his bilirubin of 1 6 from previously recorded values of 3 3  Patient's INR is supratherapeutic at 3 95, and he appears to have a baseline renal dysfunction with creatinine ranging between 1 6-1 9  Procedures Performed:   10/19 Cholecystostomy tube exchange- IR    Summary of Hospital Course: Patient was admitted for above  His cholecystostomy tube was replaced on 10/19 by IR and he tolerated the procedure well  His hospital course was complicated by a supratherapeutic INR requiring reversal with Vitamin K and FFP as well as an REMA  Medicine was consulted for assistance with management of the patients INR and Nephrology was consulted for patient's REMA  His Cr began normalizing and his urine output improved  The patients sotalol was decreased to 80mg daily in the setting of decreased renal function  Over the next few days, the patient's INR returned to the therapeutic range and the patient was tolerating a diet and ambulating with assistance  The patient was seen on rounds on 10/24 and deemed stable for discharge home with close outpatient follow up, INR checks, and home health PT  Significant Findings, Care, Treatment and Services Provided:   IR cholecystostomy tube check/change/reposition/reinsertion/upsize   Final Result by Adriana Fontaine MD (10/20 1330)   Impression:    1  Technically successful cholecystostomy tube check and exchange  Workstation performed: [id number]         PE Study with CT Abdomen and Pelvis with contrast   Final Result by Shiv Barbour MD (10/18 2057)      1  No filling defect to suggest acute or chronic pulmonary embolism in the entire pulmonary arterial system  Measured RV/LV ratio is within normal limits at less than 0 9      2   Cholelithiasis with distended gallbladder and inflammation of the gallbladder wall and adjacent fat despite appropriately positioned cholecystostomy tube, likely related to obstructed catheter   There are inflammatory changes about the catheter tract    with probable phlegmon splaying the rectus abdominis musculature without discrete collection/abscess  The study was marked in EPIC for significant notification  Workstation performed: [id number]         XR chest 1 view portable   Final Result by Phoebe Iqbal MD (10/18 2058)      Stable mild central pulmonary vascular congestion and cardiomegaly  Workstation performed: [id number]         IR cholecystostomy tube check/change/reposition/reinsertion/upsize    (Results Pending)         Complications: Supratherapeutic INR, REMA    Discharge Diagnosis: Cholecystitis    Medical Problems             Resolved Problems  Date Reviewed: 10/19/2022   None                 Condition at Discharge: stable         Discharge instructions/Information to patient and family:   See after visit summary for information provided to patient and family  Provisions for Follow-Up Care:  See after visit summary for information related to follow-up care and any pertinent home health orders  Please follow up with your PCP, will need specific follow up regarding INR and warfarin dosing, home medications, and labs regarding immunoglobulin free light chains and protein electrophoresis  PCP: Ghazala Membreno DO    Disposition: See After Visit Summary for discharge disposition information  Planned Readmission: No      Discharge Statement   I spent 25 minutes discharging the patient  This time was spent on the day of discharge  I had direct contact with the patient on the day of discharge  Additional documentation is required if more than 30 minutes were spent on discharge  Discharge Medications:  See after visit summary for reconciled discharge medications provided to patient and family         Brice Vieyra MD  General Surgery   10/24/22

## 2022-10-24 NOTE — PHYSICAL THERAPY NOTE
PHYSICAL THERAPY EVALUATION NOTE          Patient Name: Placido Jones  [id number] Date: 10/24/2022      AGE:   81 y o    Mrn:   [id number]  ADMIT DX:  Acute cholecystitis [K81 0]  Bladder pain [R39 89]  PAF (paroxysmal atrial fibrillation) ([facility] ) [I48 0]  Supratherapeutic INR [R79 1]  S/P AVR [Z95 2]    Past Medical History:  Past Medical History:   Diagnosis Date    Anemia     Arthritis     Atrial fibrillation ([place] [address] )     Basal cell carcinoma 03/22/2022    Tip of Nose    CHF (congestive heart failure) (HCC)     Diabetes mellitus (HCC)     Niddm    DVT (deep vein thrombosis) in pregnancy 1966    not in pregnancy    DVT (deep venous thrombosis) ([place] [address] ) 1966    Dyslipidemia     GERD (gastroesophageal reflux disease)     Hyperlipidemia     Hypertension     Irregular heart beat     Afib    Morbid obesity due to excess calories ([place] [address] )     Resolved 9/2/2014     Pulmonary embolism (HCC)     Sepsis ([place] [address] )     Squamous cell skin cancer 07/30/2020    Left posterior scalp    Visual impairment        Past Surgical History:  Past Surgical History:   Procedure Laterality Date    AORTIC VALVE REPLACEMENT      CARDIAC DEFIBRILLATOR PLACEMENT  04/2014    CARDIAC SURGERY  02/2014    AVR    COLONOSCOPY      INSERT / REPLACE / REMOVE PACEMAKER      IR CHOLECYSTOSTOMY TUBE CHECK/CHANGE/REPOSITION/REINSERTION/UPSIZE  10/13/2022    IR CHOLECYSTOSTOMY TUBE CHECK/CHANGE/REPOSITION/REINSERTION/UPSIZE  10/19/2022    IR CHOLECYSTOSTOMY TUBE PLACEMENT  9/1/2022    JOINT REPLACEMENT Left     LTKR    MOHS SURGERY  07/30/2020    Left posterior scalp, Dr Torito Vincent  04/18/2022    BCC Tip of Nose- Dr Diane Kothari SEP-FEM JUNC Right 8/17/2018    Procedure: LEG PERFORATED INJECTION SCLEROTHERAPY;  Surgeon: Milton Sanabria MD;  [facility];  Service: Vascular    REPLACEMENT TOTAL KNEE Right     TOTAL KNEE ARTHROPLASTY Left     VASCULAR SURGERY      VENA CAVA FILTER PLACEMENT      Interruption inferior vena cava, [place] filter, placement    WISDOM TOOTH EXTRACTION       Length Of Stay: 6        PHYSICAL THERAPY EVALUATION:    Patient's identity confirmed via 2 patient identifiers (full name and ) at start of session       10/24/22 0828   PT Last Visit   PT Visit Date 10/24/22   Note Type   Note type Evaluation   Pain Assessment   Pain Assessment Tool 0-10   Pain Score No Pain   Restrictions/Precautions   Weight Bearing Precautions Per Order No   Other Precautions Multiple lines   Home Living   Type of [address] Two level;Performs ADLs on one level; Able to live on main level with bedroom/bathroom  (0 RADHA)   Bathroom Shower/Tub Walk-in shower   Bathroom Toilet Raised   Bathroom Equipment Shower chair   Bathroom Accessibility Accessible   Home Equipment Walker;Cane  (SPC, laly RW, 3 wheeled rollator walker)   Additional Comments Pt lives w/ his wife and family (daughter's family) in a 2 level house w/ 0 RADHA  Pt remains on the first floor w/ bedroom and full shower   Prior Function   Level of [place] Independent with functional mobility; Independent with ADLs; Needs assistance with IADLS   Lives With Spouse; Family   Receives Help From Family  (daughter's family lives on 2nd floor of house)   IADLs Independent with driving;Family/Friend/Other provides meals; Family/Friend/Other provides medication management   Falls in the last 6 months 0   Vocational Self employed  ()   Comments At baseline, pt ambulates w/ SPC for household distances and RW vs rollator walker in community   General   Family/Caregiver Present Yes  (pt's spouse)   Cognition   Overall Cognitive Status WFL   Arousal/Participation Cooperative   Orientation Level Oriented X4   Memory Within functional limits   Following Commands Follows multistep commands without difficulty   Comments Pt ID via name and ; pt agreeable to PT eval and functional mobility   Strength RLE   RLE Overall Strength 3+/5  (grossly assessed w/ functional mobility)   Strength LLE   LLE Overall Strength 3+/5  (grossly assessed w/ functional mobility)   Vision-Basic Assessment   Current Vision Wears glasses all the time   Bed Mobility   Supine to Sit Unable to assess   Sit to Supine Unable to assess   Additional Comments pt OOB sitting in chair upon arrival and returned to chair at end of session   Transfers   Sit to Stand 5  Supervision   Additional items Assist x 1; Armrests; Increased time required   Stand to Sit 5  Supervision   Additional items Assist x 1; Armrests; Increased time required;Verbal cues   Ambulation/Elevation   Gait pattern Decreased foot clearance; Forward Flexion; Short stride; Excessively slow   Gait Assistance 5  Supervision   Additional items Assist x 1;Verbal cues   Assistive Device Rolling walker   Distance 79'   Ambulation/Elevation Additional Comments Pt demonstrated ability to negotiate multiple turns and obstacles w/o evidence of LOB   Balance   Static Sitting Fair +   Dynamic Sitting Fair   Static Standing Fair   Dynamic Standing [address],4, & 5 -  (w/ RW)   Endurance Deficit   Endurance Deficit Yes   Endurance Deficit Description pt w/ limited ambulatory endurance   Activity Tolerance   Activity Tolerance Patient limited by fatigue   Medical Staff [facility] Residents via role on TT   Nurse Made Aware JEET Fishman   Assessment   Prognosis Good   Problem List Decreased strength;Decreased endurance; Impaired balance;Decreased mobility   Assessment Susan Pollard is a 81 y o  Male who [facility]18[facility] due to surgical problem re-evaluation and diagnosis of acute cholecystitis  Orders for PT eval and treat received  Comorbidities affecting pt at time of evaluation include: DM, anemia, CHF, chronic DVT, CKD, osteoarthritis  Personal factors affecting DC include: ambulating w/ assistive device   At baseline, pt mobilizes mod I w/ SPC vs walkers, and w/ 0 fall(s) in the previous 6 months  Upon evaluation, pt presents w/ the following deficits: weakness, impaired balance, decreased endurance and gait deviations  Pt currently requires supervision for transfers and supervision w/ RW for ambulation  Pt's clinical presentation is unstable/unpredictable due to need for input for mobility technique, abnormal lab values, ongoing medical monitoring/management, and recent drastic decline in mobility compared to baseline  Pt is at an increased risk of falls due to impaired balance  From a PT/mobility standpoint, given the above findings, DC recommendation is: Home w/ HHPT  During current admission, pt will benefit from continued skilled inpatient PT in the acute care setting in order to address the above deficits and to maximize function and mobility prior to DC from acute care  Goals   Patient Goals to go home today   STG Expiration Date 11/03/22   Short Term Goal #1 Pt will: perform bed mobility w/ mod I to decrease pt's burden of care and increase pt's independence w/ repositioning in bed; perform transfers w/ mod I to increase pt's OOB mobility; ambulate at least 250' w/ LRAD and mod I to increase pt's ambulatory endurance/tolerance; increase all balance ratings by at least 1 grade to decrease pt's risk of falls   PT Treatment Day 0   Plan   Treatment/Interventions Functional transfer training;LE strengthening/ROM; Therapeutic exercise; Endurance training;Patient/family training;Equipment eval/education; Bed mobility   PT Frequency 2-3x/wk   Recommendation   PT Discharge Recommendation Home with home health rehabilitation   Equipment Recommended Joe walker   Change/add to [email or web address]?  Yes, Change Size  (wide RW, pt reports owning PTA)   AM-PAC Basic Mobility Inpatient   Turning in Bed Without Bedrails 3   Lying on Back to Sitting on Edge of Flat Bed 3   Moving Bed to Chair 3   Standing Up From Chair 3   Walk in Room 3   Climb 3-5 Stairs 3   Basic Mobility Inpatient Raw Score 18   Basic Mobility Standardized Score 41 05   Highest Level Of Mobility   JH-HLM Goal 6: Walk 10 steps or more   JH-HLM Achieved 7: Walk 25 feet or more   End of Consult   Patient Position at End of Consult Bedside chair; All needs within reach       The patient's AM-PAC Basic Mobility Inpatient Short Form Raw Score is 18  A Raw score of greater than 16 suggests the patient may benefit from discharge to home  Please also refer to the recommendation of the Physical Therapist for safe discharge planning      Pt will benefit from skilled inpatient PT during this admission in order to facilitate progress towards goals and to maximize functional independence prior to [address] rec: Almedia Goodell, PT, DPT  10/24/22

## 2022-10-25 ENCOUNTER — HOME CARE VISIT (OUTPATIENT)
Dept: HOME HEALTH SERVICES | Facility: HOME HEALTHCARE | Age: 87
End: 2022-10-25
Payer: MEDICARE

## 2022-10-25 VITALS
OXYGEN SATURATION: 94 % | SYSTOLIC BLOOD PRESSURE: 126 MMHG | BODY MASS INDEX: 40.02 KG/M2 | DIASTOLIC BLOOD PRESSURE: 72 MMHG | TEMPERATURE: 97.5 F | HEART RATE: 74 BPM | HEIGHT: 73 IN | WEIGHT: 302 LBS

## 2022-10-25 LAB
ALBUMIN SERPL ELPH-MCNC: 3.6 G/DL (ref 3.5–5)
ALBUMIN SERPL ELPH-MCNC: 54.5 % (ref 52–65)
ALBUMIN UR ELPH-MCNC: 100 %
ALPHA1 GLOB MFR UR ELPH: 0 %
ALPHA1 GLOB SERPL ELPH-MCNC: 0.48 G/DL (ref 0.1–0.4)
ALPHA1 GLOB SERPL ELPH-MCNC: 7.3 % (ref 2.5–5)
ALPHA2 GLOB MFR UR ELPH: 0 %
ALPHA2 GLOB SERPL ELPH-MCNC: 1.06 G/DL (ref 0.4–1.2)
ALPHA2 GLOB SERPL ELPH-MCNC: 16 % (ref 7–13)
B-GLOBULIN MFR UR ELPH: 0 %
BETA GLOB ABNORMAL SERPL ELPH-MCNC: 0.27 G/DL (ref 0.4–0.8)
BETA1 GLOB SERPL ELPH-MCNC: 4.1 % (ref 5–13)
BETA2 GLOB SERPL ELPH-MCNC: 3.8 % (ref 2–8)
BETA2+GAMMA GLOB SERPL ELPH-MCNC: 0.25 G/DL (ref 0.2–0.5)
GAMMA GLOB ABNORMAL SERPL ELPH-MCNC: 0.94 G/DL (ref 0.5–1.6)
GAMMA GLOB MFR UR ELPH: 0 %
GAMMA GLOB SERPL ELPH-MCNC: 14.3 % (ref 12–22)
IGG/ALB SER: 1.2 {RATIO} (ref 1.1–1.8)
INTERPRETATION UR IFE-IMP: NORMAL
KAPPA LC FREE SER-MCNC: 72.2 MG/L (ref 3.3–19.4)
KAPPA LC FREE/LAMBDA FREE SER: 1.79 {RATIO} (ref 0.26–1.65)
LAMBDA LC FREE SERPL-MCNC: 40.3 MG/L (ref 5.7–26.3)
M PROTEIN 1 MFR SERPL ELPH: 2.8 %
M PROTEIN 1 SERPL ELPH-MCNC: 0.18 G/DL
PROT PATTERN SERPL ELPH-IMP: ABNORMAL
PROT PATTERN UR ELPH-IMP: ABNORMAL
PROT SERPL-MCNC: 6.6 G/DL (ref 6.4–8.2)
PROT UR-MCNC: 42 MG/DL

## 2022-10-25 PROCEDURE — 84166 PROTEIN E-PHORESIS/URINE/CSF: CPT | Performed by: PATHOLOGY

## 2022-10-25 PROCEDURE — G0151 HHCP-SERV OF PT,EA 15 MIN: HCPCS

## 2022-10-25 PROCEDURE — 84165 PROTEIN E-PHORESIS SERUM: CPT | Performed by: PATHOLOGY

## 2022-10-25 PROCEDURE — 86334 IMMUNOFIX E-PHORESIS SERUM: CPT | Performed by: PATHOLOGY

## 2022-10-26 ENCOUNTER — PREP FOR PROCEDURE (OUTPATIENT)
Dept: INTERVENTIONAL RADIOLOGY/VASCULAR | Facility: CLINIC | Age: 87
End: 2022-10-26

## 2022-10-26 ENCOUNTER — TELEPHONE (OUTPATIENT)
Dept: NEPHROLOGY | Facility: CLINIC | Age: 87
End: 2022-10-26

## 2022-10-26 ENCOUNTER — TELEPHONE (OUTPATIENT)
Dept: INTERVENTIONAL RADIOLOGY/VASCULAR | Facility: CLINIC | Age: 87
End: 2022-10-26

## 2022-10-26 ENCOUNTER — HOME CARE VISIT (OUTPATIENT)
Dept: HOME HEALTH SERVICES | Facility: HOME HEALTHCARE | Age: 87
End: 2022-10-26
Payer: MEDICARE

## 2022-10-26 DIAGNOSIS — D47.2 MONOCLONAL PARAPROTEINEMIA: Primary | ICD-10-CM

## 2022-10-26 DIAGNOSIS — K81.0 ACUTE CHOLECYSTITIS: Primary | ICD-10-CM

## 2022-10-26 LAB
BACTERIA BLD CULT: NORMAL
BACTERIA BLD CULT: NORMAL

## 2022-10-26 NOTE — TELEPHONE ENCOUNTER
Wife called regarding clogged cholecystostomy tube  I returned a call  Her  is without fever or pain  She reports a clogged cholecystostomy tube with leakage along side the drain to the dressings  Will arrange for him to come in for cholecystostomy tube check/change tomorrow  Placed order with schedulers and contacted them

## 2022-10-26 NOTE — CASE COMMUNICATION
[facility] has admitted your patient to [address] [place] [address] service with the following disciplines:      PT    Recommend home OT and pt declines    Response needed, please respond via EPIC communication and documentation pertaining to medication information listed below  Primary focus of home health care URINARY    Patient stated goals of care to get stronger in the legs, wants to get back to activity as an , to get appetite back     Anticipated visit pattern 2wk4 and 1wk2    See medication list - meds in home differ from AVS Pt reports taking Metformin which is not listed in EPIC medication list   Pt reports not taking hydrocodone-Acetaminophen  Report not using Colchicine "in a long time"  Only taking Sodium Chloride in the hospital and takes Polyethylene Glycol as needed  Pt reports taking extra strength Tylenol as needed for pain  Pt reports having Marifer Arnt in the home, but has not taken it since hospital admission    Significant clinical findings inc edema BLE, ataxic and usnteady gait pattern  Site of BRIANNA TUBE insertion, yellow mucous type drainage and red around edges of insertion  pt reports having a MD appointment to check the BRIANNA TUBE insertion site "soon"  Potential barriers to goal achievement inc fatigue and BLE weakness, fall risk      Thank you for allowing us to part nikko in the care of your patient        Nirmala HERNANDEZT

## 2022-10-26 NOTE — CASE COMMUNICATION
Major drug to drug medication interaction include:  Sotalol and Albuterol, Colchicine and atorvastatin

## 2022-10-26 NOTE — TELEPHONE ENCOUNTER
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

## 2022-10-27 ENCOUNTER — TELEPHONE (OUTPATIENT)
Dept: HEMATOLOGY ONCOLOGY | Facility: CLINIC | Age: 87
End: 2022-10-27

## 2022-10-27 ENCOUNTER — HOME CARE VISIT (OUTPATIENT)
Dept: HOME HEALTH SERVICES | Facility: HOME HEALTHCARE | Age: 87
End: 2022-10-27
Payer: MEDICARE

## 2022-10-27 ENCOUNTER — HOSPITAL ENCOUNTER (OUTPATIENT)
Dept: RADIOLOGY | Facility: HOSPITAL | Age: 87
Discharge: HOME | End: 2022-10-27
Attending: RADIOLOGY
Payer: MEDICARE

## 2022-10-27 VITALS — HEART RATE: 70 BPM | SYSTOLIC BLOOD PRESSURE: 108 MMHG | DIASTOLIC BLOOD PRESSURE: 62 MMHG | OXYGEN SATURATION: 97 %

## 2022-10-27 DIAGNOSIS — K81.0 ACUTE CHOLECYSTITIS: ICD-10-CM

## 2022-10-27 PROCEDURE — C1729 CATH, DRAINAGE: HCPCS

## 2022-10-27 PROCEDURE — C1769 GUIDE WIRE: HCPCS

## 2022-10-27 PROCEDURE — 47531 INJECTION FOR CHOLANGIOGRAM: CPT

## 2022-10-27 PROCEDURE — G0157 HHC PT ASSISTANT EA 15: HCPCS

## 2022-10-27 RX ADMIN — IOHEXOL 10 ML: 350 INJECTION, SOLUTION INTRAVENOUS at 10:26

## 2022-10-27 NOTE — H&P
Interventional Radiology  History and Physical 10/27/2022     Veronica Mishra   1935   [id number]    Assessment/Plan:  25-year-old male with history of acute cholecystitis status post cholecystostomy tube placement, most recently exchanged on 10/19/2022, returns due to no output from drain for 2 days  There has been some leakage around the tube  Patient returns for a drain check/change  Problem List Items Addressed This Visit        Digestive    Acute cholecystitis    Relevant Orders    IR cholecystostomy tube check/change/reposition/reinsertion/upsize             Subjective:     Patient ID: Veronica Mishra is a 81 y o  male  History of Present Illness  Patient with history of acute cholecystitis status post cholecystostomy tube placement, most recently exchanged on 10/19/2022, returns due to no output from drain for 2 days  There has been some leakage around the tube  Patient returns for a drain check/change      Review of Systems      Past Medical History:   Diagnosis Date   • Anemia    • Arthritis    • Atrial fibrillation ([address] )    • Basal cell carcinoma 03/22/2022    Tip of Nose   • CHF (congestive heart failure) (HCC)    • Diabetes mellitus (HCC)     Niddm   • DVT (deep vein thrombosis) in pregnancy 1966    not in pregnancy   • DVT (deep venous thrombosis) ([address] ) 1966   • Dyslipidemia    • GERD (gastroesophageal reflux disease)    • Hyperlipidemia    • Hypertension    • Hypomagnesemia 10/19/2022   • Irregular heart beat     Afib   • Morbid obesity due to excess calories ([address] )     Resolved 9/2/2014    • Pulmonary embolism (HCC)    • Sepsis ([address] )    • Squamous cell skin cancer 07/30/2020    Left posterior scalp   • Visual impairment         Past Surgical History:   Procedure Laterality Date   • AORTIC VALVE REPLACEMENT     • CARDIAC DEFIBRILLATOR PLACEMENT  04/2014   • CARDIAC SURGERY  02/2014    AVR   • COLONOSCOPY     • INSERT / REPLACE / REMOVE PACEMAKER     • IR CHOLECYSTOSTOMY TUBE CHECK/CHANGE/REPOSITION/REINSERTION/UPSIZE  10/13/2022   • IR CHOLECYSTOSTOMY TUBE CHECK/CHANGE/REPOSITION/REINSERTION/UPSIZE  10/19/2022   • IR CHOLECYSTOSTOMY TUBE PLACEMENT  9/1/2022   • JOINT REPLACEMENT Left     LTKR   • MOHS SURGERY  07/30/2020    Left posterior scalp, Dr Liliana Esparza   • [address]  04/18/2022    [address] Tip of Nose- Dr Liliana Esparza   • MD LIGATE/STRIP LONG [address] SEP-FEM JUNC Right 8/17/2018    Procedure: LEG PERFORATED INJECTION SCLEROTHERAPY;  Surgeon: Bridgette Figueroa MD;  [facility];  Service: Vascular   • REPLACEMENT TOTAL KNEE Right    • TOTAL KNEE ARTHROPLASTY Left    • VASCULAR SURGERY     • VENA CAVA FILTER PLACEMENT      Interruption inferior vena cava, [place] filter, placement   • WISDOM TOOTH EXTRACTION          Social History     Tobacco Use   Smoking Status Never Smoker   Smokeless Tobacco Never Used        Social History     Substance and Sexual Activity   Alcohol Use Not Currently    Comment: no alcohol in 28 yrs        Social History     Substance and Sexual Activity   Drug Use Never        Allergies   Allergen Reactions   • Tramadol Other (See Comments)     intolerance       Current Outpatient Medications   Medication Sig Dispense Refill   • Acetaminophen (TYLENOL EXTRA STRENGTH PO) Take 500 mg by mouth if needed (for pain as needed)   Indications: pain as needed     • acetaminophen (TYLENOL) 325 mg tablet Take 3 tablets (975 mg total) by mouth every 8 (eight) hours  0   • albuterol (PROVENTIL HFA,VENTOLIN HFA) 90 mcg/act inhaler      • amoxicillin-clavulanate (AUGMENTIN) 875-125 mg per tablet Take 1 tablet by mouth every 12 (twelve) hours for 4 days 8 tablet 0   • atorvastatin (LIPITOR) 40 mg tablet Take 40 mg by mouth daily after dinner Atorvastatin Calcium 40 MG Oral Tablet Take 1 tablet daily  Refills: 0  Active      • B Complex-C (SUPER B COMPLEX PO) Take 1 capsule by mouth daily      • colchicine (COLCRYS) 0 6 mg tablet Take 0 6 mg by mouth as needed for muscle/joint pain     • fluticasone (FLONASE) 50 mcg/act nasal spray 2 sprays as needed Shake liquid and spray      • gabapentin (NEURONTIN) 100 mg capsule Take 100 mg by mouth daily     • HYDROcodone-acetaminophen (NORCO) 5-325 mg per tablet Take 1 tablet by mouth every 6 (six) hours as needed     • Iron Combinations (NIFEREX) TABS Take 1 tablet by mouth in the morning  5   • metFORMIN (GLUCOPHAGE) 1000 MG tablet Take 1,000 mg by mouth daily with dinner  Indications: Type 2 Diabetes     • Multiple Vitamin (MULTIVITAMINS PO) Take 1 tablet by mouth daily     • mupirocin (BACTROBAN) 2 % ointment as needed      • omeprazole (PriLOSEC) 20 mg delayed release capsule Omeprazole 20 MG Oral Tablet Delayed Release Take 1 tablet daily  Refills: 0  Active     • polyethylene glycol (GLYCOLAX) 17 GM/SCOOP powder Take 17 g by mouth 2 (two) times a day     • potassium chloride (K-DUR,KLOR-CON) 20 mEq tablet Take 20 mEq by mouth daily      • sodium chloride, PF, 0 9 % 10 mL by Intracatheter route daily Intracatheter flushing daily 300 mL 1   • sotalol (BETAPACE) 80 mg tablet Take 1 tablet (80 mg total) by mouth daily  0   • torsemide (DEMADEX) 20 mg tablet TAKE 3 TABLETS(60 MG TOTAL) BY MOUTH EVERY DAY  CAN TAKE 40 MG IN THE AM AND 20 MG IN THE AFTERNOON 270 tablet 3   • warfarin (COUMADIN) 2 mg tablet Take 2 tablets (4 mg total) by mouth daily Acknowledge 14 tablet 0     No current facility-administered medications for this encounter  Objective: There were no vitals filed for this visit  Physical Exam  Constitutional:       Appearance: Normal appearance     Pulmonary:      Effort: Pulmonary effort is normal    Abdominal:      Comments: Gail tube in place           Lab Results   Component Value Date     (H) 08/29/2022      Lab Results   Component Value Date    WBC 6 79 10/23/2022    HGB 8 5 (L) 10/23/2022    HCT 26 7 (L) 10/23/2022    MCV 86 10/23/2022     10/23/2022     Lab Results   Component Value Date    INR 2 06 (H) 10/24/2022    INR 1 98 (H) 10/23/2022    INR 2 50 (H) 10/22/2022    PROTIME 23 4 (H) 10/24/2022    PROTIME 22 7 (H) 10/23/2022    PROTIME 27 2 (H) 10/22/2022     Lab Results   Component Value Date    PTT 67 (H) 10/18/2022         I have personally reviewed pertinent imaging and laboratory results  Code Status: Prior  Advance Directive and Living Will:      Power of :    POLST:      This text is generated with voice recognition software  There may be translation, syntax,  or grammatical errors  If you have any questions, please contact the dictating provider

## 2022-10-27 NOTE — BRIEF OP NOTE (RAD/CATH)
INTERVENTIONAL RADIOLOGY PROCEDURE NOTE    Date: 10/27/2022    Procedure: IR CHOLECYSTOSTOMY TUBE CHECK/CHANGE/REPOSITION/REINSERTION/UPSIZE    Preoperative diagnosis:   1  Acute cholecystitis         Postoperative diagnosis: Same  Surgeon: Rebeca Aaron     Assistant: None  No qualified resident was available  Blood loss: None    Specimens: None     Findings:   1  Cholecystostomy tube was in proper position within gallbladder lumen  2  Multiple filling defects within the gallbladder consistent with stones  3  Cholecystostomy tube exchange using 10 [place] catheter  4  Cystic and common bile ducts were patent  Complications: None immediate      Anesthesia: local

## 2022-10-27 NOTE — TELEPHONE ENCOUNTER
I spoke to Leida Tavarez and he would like hematology to call and help coordinate consult appointment   Patient lives in [place] he is okay going to either location

## 2022-10-27 NOTE — DISCHARGE INSTRUCTIONS
TUBE CARE INSTRUCTIONS    Care after your procedure:    Resume your normal diet  Small sips of flat soda will help with nausea  1  The properly functioning catheter should be forward flushed once (1x) daily with 10ml of normal saline using clean technique  You will be given a prescription for flushes  To flush the tube, clean both connections with alcohol swab  Twist off the drainage bag/ bulb  tubing and twist the saline syringe into the drainage tube and flush  Remove the syringe and twist the drainage bag / bulb tubing tubing back on     2  The drainage bag/bulb may be emptied as necessary  Keep a record of the amount of fluid you drain from your tube  This should be done with clean technique as well  3  A fresh dressing should be applied daily over the tube insertion site  4  As the tube is secured to the skin with only a suture,try not to pull on your tube  Tub baths are not permitted  Showers are permitted if the patient's skin entry site is prevented from getting wet  Similarly, washcloth "baths" are acceptable  Contact Interventional Radiology at [phone] Denn[place] PATIENTS: Contact Interventional Radiology at [phone]) Cristal Rangel PATIENTS: Contact Interventional Radiology at [phone]) if:    1  Leakage or large amounts of liquid around the catheter  2  Fever of 101 degrees lasting several hours without other obvious cause (such as sore throat, flu, etc)  3  Persistent nausea or vomiting  4  Diminished drainage, which may be associated with pressure or pain  Or when the     drainage from your tube is less than 10mls for 48 hours  5  Catheter pulled back or falls out  The following pharmacies carry the flush syringes         [facility]                     [facility]  [address] [facility]                         [address] [facility]  Phone [phone]            Phone [phone]   [place] [address]                                [phone]  [address] [facility] [address]                      [address] [facility]  Phone [phone]            Phone [phone]                      [facility]                                                                                                          [phone]  [facility]  [facility]    [address]  Phone [phone]

## 2022-10-27 NOTE — TELEPHONE ENCOUNTER
Made attempt to schedule new patient consultation  Spoke with patient who explained that he is just getting home from the hospital and will call back at a later time  I reviewed [place] phone number with patient

## 2022-10-28 NOTE — HOME HEALTH
"The tube that drains from my gallbladder got blocked  This morning I had a new tube placed in my gall bladder   I have a lot of pain and I am not sure how much I can do today"

## 2022-10-31 ENCOUNTER — TELEPHONE (OUTPATIENT)
Dept: NEPHROLOGY | Facility: HOSPITAL | Age: 87
End: 2022-10-31

## 2022-10-31 ENCOUNTER — HOME CARE VISIT (OUTPATIENT)
Dept: HOME HEALTH SERVICES | Facility: HOME HEALTHCARE | Age: 87
End: 2022-10-31

## 2022-10-31 VITALS
SYSTOLIC BLOOD PRESSURE: 122 MMHG | DIASTOLIC BLOOD PRESSURE: 54 MMHG | RESPIRATION RATE: 16 BRPM | HEART RATE: 78 BPM | TEMPERATURE: 96.7 F | OXYGEN SATURATION: 99 %

## 2022-10-31 NOTE — TELEPHONE ENCOUNTER
Called and spoke with Patient to complete their bloodwork prior to their appointment on 11/7 with Dr Cathleen Brady at the [facility]

## 2022-10-31 NOTE — TELEPHONE ENCOUNTER
----- Message from Rosas Broussard, DO sent at 10/31/2022 10:01 AM EDT -----  He already has BMP ordered - please ensure he gets this done, thanks!  ----- Message -----  From: Cuca Thorne  Sent: 10/31/2022   9:38 AM EDT  To: Rosas Broussard, DO    What labs you want me to order   Thanks

## 2022-11-01 ENCOUNTER — TELEPHONE (OUTPATIENT)
Dept: HEMATOLOGY ONCOLOGY | Facility: CLINIC | Age: 87
End: 2022-11-01

## 2022-11-01 ENCOUNTER — HOME CARE VISIT (OUTPATIENT)
Dept: HOME HEALTH SERVICES | Facility: HOME HEALTHCARE | Age: 87
End: 2022-11-01

## 2022-11-01 VITALS — OXYGEN SATURATION: 92 % | SYSTOLIC BLOOD PRESSURE: 118 MMHG | HEART RATE: 97 BPM | DIASTOLIC BLOOD PRESSURE: 70 MMHG

## 2022-11-01 NOTE — CASE COMMUNICATION
Back office please fax to PCP  Zander Liu DO  at [phone]  [facility] has added SN to [facility] Bustos  [address] service    Response needed, please respond via phone [phone]  Primary focus of home health care:   Patient stated goals of care: get back to work  Anticipated visit pattern: 2w3 and next visit date: 11/3/22  Significant clinical findings:  TC to Zander Liu [phone] left message with  [place] to report patient had diarrhea this am and an increase in fatigue in last week  /54 HR 78  Decrease in appetite in last wee  Patient reports shooting dagger pain when moving left arm down ever since having blood cultures done last week  There is a hard mass under the skin in the left anticubital area it is not warm or red  Patient taking the following not in [facility] EPIC Candesartan HCT 16-12 5 mg daily, vitamin D3 1000IU inder y, and tylenol 975 mg every 8 hours  Taking Gabapentin PRN 100mg for pain, not 300 mg HS  No longer using mupirocin or miralax  Confirmed PCP fax number as  [phone]  Potential barriers to goal achievement: imobility  Other pertinent information: -TT to Dr Paulina Akins to clarify frequency of Gail tube flushing  Verbal order obtained for patient to flush BID  Thank you for allowing us to participate in the care of your patient         Nan MARCANO

## 2022-11-01 NOTE — TELEPHONE ENCOUNTER
Made attempt to schedule a new patient appointment with Hematology oncology, patient stating he will call back to schedule a appointment at a later time

## 2022-11-02 ENCOUNTER — HOME CARE VISIT (OUTPATIENT)
Dept: HOME HEALTH SERVICES | Facility: HOME HEALTHCARE | Age: 87
End: 2022-11-02

## 2022-11-02 NOTE — CASE COMMUNICATION
TC to schedule routine visit for 11/3/22 patient reports seeing MD tomorrow so has no need for SN visit tomorrow

## 2022-11-04 ENCOUNTER — HOME CARE VISIT (OUTPATIENT)
Dept: HOME HEALTH SERVICES | Facility: HOME HEALTHCARE | Age: 87
End: 2022-11-04

## 2022-11-04 ENCOUNTER — TELEPHONE (OUTPATIENT)
Dept: NEPHROLOGY | Facility: CLINIC | Age: 87
End: 2022-11-04

## 2022-11-04 ENCOUNTER — APPOINTMENT (EMERGENCY)
Dept: RADIOLOGY | Facility: HOSPITAL | Age: 87
DRG: 444 | End: 2022-11-04
Payer: MEDICARE

## 2022-11-04 ENCOUNTER — HOSPITAL ENCOUNTER (INPATIENT)
Facility: HOSPITAL | Age: 87
LOS: 10 days | Discharge: NURSING, REHAB OR LONG-TERM CARE | DRG: 444 | End: 2022-11-14
Attending: EMERGENCY MEDICINE | Admitting: HOSPITALIST
Payer: MEDICARE

## 2022-11-04 ENCOUNTER — APPOINTMENT (EMERGENCY)
Dept: CT IMAGING | Facility: HOSPITAL | Age: 87
DRG: 444 | End: 2022-11-04
Payer: MEDICARE

## 2022-11-04 DIAGNOSIS — M79.605 PAIN IN BOTH LOWER EXTREMITIES: ICD-10-CM

## 2022-11-04 DIAGNOSIS — K81.9 CHOLECYSTITIS: ICD-10-CM

## 2022-11-04 DIAGNOSIS — M10.9 GOUT, UNSPECIFIED CAUSE, UNSPECIFIED CHRONICITY, UNSPECIFIED SITE: ICD-10-CM

## 2022-11-04 DIAGNOSIS — R13.10 DYSPHAGIA: ICD-10-CM

## 2022-11-04 DIAGNOSIS — T85.518A CHOLECYSTOSTOMY TUBE DYSFUNCTION: ICD-10-CM

## 2022-11-04 DIAGNOSIS — K81.0 ACUTE CHOLECYSTITIS: ICD-10-CM

## 2022-11-04 DIAGNOSIS — R41.82 ALTERED MENTAL STATUS: ICD-10-CM

## 2022-11-04 DIAGNOSIS — M79.604 PAIN IN BOTH LOWER EXTREMITIES: ICD-10-CM

## 2022-11-04 DIAGNOSIS — R10.11 RUQ PAIN: ICD-10-CM

## 2022-11-04 DIAGNOSIS — R53.1 WEAKNESS: Primary | ICD-10-CM

## 2022-11-04 DIAGNOSIS — K81.2 ACUTE ON CHRONIC CHOLECYSTITIS: ICD-10-CM

## 2022-11-04 DIAGNOSIS — K83.09 CHOLANGITIS: ICD-10-CM

## 2022-11-04 PROBLEM — G93.40 ENCEPHALOPATHY ACUTE: Status: ACTIVE | Noted: 2022-11-04

## 2022-11-04 PROBLEM — G93.40 ENCEPHALOPATHY ACUTE: Status: RESOLVED | Noted: 2022-11-04 | Resolved: 2022-11-04

## 2022-11-04 PROBLEM — M54.6 THORACIC BACK PAIN: Status: ACTIVE | Noted: 2022-11-04

## 2022-11-04 LAB
2HR DELTA HS TROPONIN: 0 NG/L
ALBUMIN SERPL BCP-MCNC: 3.7 G/DL (ref 3.5–5)
ALP SERPL-CCNC: 131 U/L (ref 34–104)
ALT SERPL W P-5'-P-CCNC: 65 U/L (ref 7–52)
AMMONIA PLAS-SCNC: 24 UMOL/L (ref 18–72)
ANION GAP SERPL CALCULATED.3IONS-SCNC: 10 MMOL/L (ref 4–13)
APTT PPP: 52 SECONDS (ref 23–37)
AST SERPL W P-5'-P-CCNC: 108 U/L (ref 13–39)
BACTERIA UR QL AUTO: ABNORMAL /HPF
BASOPHILS # BLD AUTO: 0.05 THOUSANDS/ÂΜL (ref 0–0.1)
BASOPHILS NFR BLD AUTO: 1 % (ref 0–1)
BILIRUB SERPL-MCNC: 1.8 MG/DL (ref 0.2–1)
BILIRUB UR QL STRIP: NEGATIVE
BNP SERPL-MCNC: 195 PG/ML (ref 0–100)
BUN SERPL-MCNC: 32 MG/DL (ref 5–25)
CALCIUM SERPL-MCNC: 9.2 MG/DL (ref 8.4–10.2)
CARDIAC TROPONIN I PNL SERPL HS: 10 NG/L
CARDIAC TROPONIN I PNL SERPL HS: 10 NG/L
CHLORIDE SERPL-SCNC: 97 MMOL/L (ref 96–108)
CLARITY UR: CLEAR
CO2 SERPL-SCNC: 28 MMOL/L (ref 21–32)
COLOR UR: YELLOW
CREAT SERPL-MCNC: 1.62 MG/DL (ref 0.6–1.3)
EOSINOPHIL # BLD AUTO: 0.16 THOUSAND/ÂΜL (ref 0–0.61)
EOSINOPHIL NFR BLD AUTO: 2 % (ref 0–6)
ERYTHROCYTE [DISTWIDTH] IN BLOOD BY AUTOMATED COUNT: 18.6 % (ref 11.6–15.1)
FLUAV RNA RESP QL NAA+PROBE: NEGATIVE
FLUBV RNA RESP QL NAA+PROBE: NEGATIVE
GFR SERPL CREATININE-BSD FRML MDRD: 37 ML/MIN/1.73SQ M
GLUCOSE SERPL-MCNC: 128 MG/DL (ref 65–140)
GLUCOSE UR STRIP-MCNC: NEGATIVE MG/DL
HCT VFR BLD AUTO: 32.1 % (ref 36.5–49.3)
HGB BLD-MCNC: 10 G/DL (ref 12–17)
HGB UR QL STRIP.AUTO: NEGATIVE
HYALINE CASTS #/AREA URNS LPF: ABNORMAL /LPF
IMM GRANULOCYTES # BLD AUTO: 0.04 THOUSAND/UL (ref 0–0.2)
IMM GRANULOCYTES NFR BLD AUTO: 1 % (ref 0–2)
INR PPP: 3.49 (ref 0.84–1.19)
KETONES UR STRIP-MCNC: NEGATIVE MG/DL
LACTATE SERPL-SCNC: 1.7 MMOL/L (ref 0.5–2)
LEUKOCYTE ESTERASE UR QL STRIP: NEGATIVE
LIPASE SERPL-CCNC: 42 U/L (ref 11–82)
LYMPHOCYTES # BLD AUTO: 1.54 THOUSANDS/ÂΜL (ref 0.6–4.47)
LYMPHOCYTES NFR BLD AUTO: 18 % (ref 14–44)
MCH RBC QN AUTO: 27 PG (ref 26.8–34.3)
MCHC RBC AUTO-ENTMCNC: 31.2 G/DL (ref 31.4–37.4)
MCV RBC AUTO: 87 FL (ref 82–98)
MONOCYTES # BLD AUTO: 1.02 THOUSAND/ÂΜL (ref 0.17–1.22)
MONOCYTES NFR BLD AUTO: 12 % (ref 4–12)
NEUTROPHILS # BLD AUTO: 5.79 THOUSANDS/ÂΜL (ref 1.85–7.62)
NEUTS SEG NFR BLD AUTO: 66 % (ref 43–75)
NITRITE UR QL STRIP: NEGATIVE
NON-SQ EPI CELLS URNS QL MICRO: ABNORMAL /HPF
NRBC BLD AUTO-RTO: 0 /100 WBCS
PH UR STRIP.AUTO: 6 [PH]
PLATELET # BLD AUTO: 387 THOUSANDS/UL (ref 149–390)
PMV BLD AUTO: 10.4 FL (ref 8.9–12.7)
POTASSIUM SERPL-SCNC: 4 MMOL/L (ref 3.5–5.3)
PROCALCITONIN SERPL-MCNC: 0.07 NG/ML
PROT SERPL-MCNC: 7.7 G/DL (ref 6.4–8.4)
PROT UR STRIP-MCNC: ABNORMAL MG/DL
PROTHROMBIN TIME: 35.3 SECONDS (ref 11.6–14.5)
RBC # BLD AUTO: 3.7 MILLION/UL (ref 3.88–5.62)
RBC #/AREA URNS AUTO: ABNORMAL /HPF
RSV RNA RESP QL NAA+PROBE: NEGATIVE
SARS-COV-2 RNA RESP QL NAA+PROBE: NEGATIVE
SODIUM SERPL-SCNC: 135 MMOL/L (ref 135–147)
SP GR UR STRIP.AUTO: 1.03 (ref 1–1.03)
UROBILINOGEN UR STRIP-ACNC: <2 MG/DL
WBC # BLD AUTO: 8.6 THOUSAND/UL (ref 4.31–10.16)
WBC #/AREA URNS AUTO: ABNORMAL /HPF

## 2022-11-04 PROCEDURE — 96375 TX/PRO/DX INJ NEW DRUG ADDON: CPT

## 2022-11-04 PROCEDURE — 99285 EMERGENCY DEPT VISIT HI MDM: CPT | Performed by: EMERGENCY MEDICINE

## 2022-11-04 PROCEDURE — 83605 ASSAY OF LACTIC ACID: CPT

## 2022-11-04 PROCEDURE — 81001 URINALYSIS AUTO W/SCOPE: CPT

## 2022-11-04 PROCEDURE — 83690 ASSAY OF LIPASE: CPT

## 2022-11-04 PROCEDURE — 0241U HB NFCT DS VIR RESP RNA 4 TRGT: CPT

## 2022-11-04 PROCEDURE — 70450 CT HEAD/BRAIN W/O DYE: CPT

## 2022-11-04 PROCEDURE — 84484 ASSAY OF TROPONIN QUANT: CPT

## 2022-11-04 PROCEDURE — 36415 COLL VENOUS BLD VENIPUNCTURE: CPT

## 2022-11-04 PROCEDURE — 71260 CT THORAX DX C+: CPT

## 2022-11-04 PROCEDURE — 85730 THROMBOPLASTIN TIME PARTIAL: CPT

## 2022-11-04 PROCEDURE — 87040 BLOOD CULTURE FOR BACTERIA: CPT

## 2022-11-04 PROCEDURE — 83880 ASSAY OF NATRIURETIC PEPTIDE: CPT | Performed by: EMERGENCY MEDICINE

## 2022-11-04 PROCEDURE — G1004 CDSM NDSC: HCPCS

## 2022-11-04 PROCEDURE — 96365 THER/PROPH/DIAG IV INF INIT: CPT

## 2022-11-04 PROCEDURE — 85610 PROTHROMBIN TIME: CPT

## 2022-11-04 PROCEDURE — 99223 1ST HOSP IP/OBS HIGH 75: CPT | Performed by: HOSPITALIST

## 2022-11-04 PROCEDURE — 82140 ASSAY OF AMMONIA: CPT

## 2022-11-04 PROCEDURE — 80053 COMPREHEN METABOLIC PANEL: CPT

## 2022-11-04 PROCEDURE — 84145 PROCALCITONIN (PCT): CPT

## 2022-11-04 PROCEDURE — 85025 COMPLETE CBC W/AUTO DIFF WBC: CPT

## 2022-11-04 PROCEDURE — 93005 ELECTROCARDIOGRAM TRACING: CPT

## 2022-11-04 PROCEDURE — 99285 EMERGENCY DEPT VISIT HI MDM: CPT

## 2022-11-04 PROCEDURE — 71045 X-RAY EXAM CHEST 1 VIEW: CPT

## 2022-11-04 PROCEDURE — 74177 CT ABD & PELVIS W/CONTRAST: CPT

## 2022-11-04 PROCEDURE — 99222 1ST HOSP IP/OBS MODERATE 55: CPT | Performed by: SURGERY

## 2022-11-04 RX ORDER — OXYCODONE HYDROCHLORIDE AND ACETAMINOPHEN 5; 325 MG/1; MG/1
1 TABLET ORAL ONCE
Status: DISCONTINUED | OUTPATIENT
Start: 2022-11-04 | End: 2022-11-04

## 2022-11-04 RX ORDER — PANTOPRAZOLE SODIUM 40 MG/1
40 TABLET, DELAYED RELEASE ORAL
Status: DISCONTINUED | OUTPATIENT
Start: 2022-11-05 | End: 2022-11-14 | Stop reason: HOSPADM

## 2022-11-04 RX ORDER — SOTALOL HYDROCHLORIDE 80 MG/1
80 TABLET ORAL DAILY
Status: DISCONTINUED | OUTPATIENT
Start: 2022-11-05 | End: 2022-11-14 | Stop reason: HOSPADM

## 2022-11-04 RX ORDER — ACETAMINOPHEN 325 MG/1
650 TABLET ORAL EVERY 6 HOURS SCHEDULED
Status: DISCONTINUED | OUTPATIENT
Start: 2022-11-04 | End: 2022-11-14 | Stop reason: HOSPADM

## 2022-11-04 RX ORDER — CEFTRIAXONE 2 G/50ML
2000 INJECTION, SOLUTION INTRAVENOUS EVERY 24 HOURS
Status: DISCONTINUED | OUTPATIENT
Start: 2022-11-04 | End: 2022-11-11

## 2022-11-04 RX ORDER — FENTANYL CITRATE 50 UG/ML
50 INJECTION, SOLUTION INTRAMUSCULAR; INTRAVENOUS ONCE
Status: COMPLETED | OUTPATIENT
Start: 2022-11-04 | End: 2022-11-04

## 2022-11-04 RX ORDER — HYDROMORPHONE HCL IN WATER/PF 6 MG/30 ML
0.2 PATIENT CONTROLLED ANALGESIA SYRINGE INTRAVENOUS EVERY 4 HOURS PRN
Status: DISCONTINUED | OUTPATIENT
Start: 2022-11-04 | End: 2022-11-14 | Stop reason: HOSPADM

## 2022-11-04 RX ORDER — LIDOCAINE 50 MG/G
1 PATCH TOPICAL ONCE
Status: COMPLETED | OUTPATIENT
Start: 2022-11-04 | End: 2022-11-05

## 2022-11-04 RX ORDER — METRONIDAZOLE 500 MG/100ML
500 INJECTION, SOLUTION INTRAVENOUS EVERY 8 HOURS
Status: DISCONTINUED | OUTPATIENT
Start: 2022-11-04 | End: 2022-11-11

## 2022-11-04 RX ORDER — SODIUM CHLORIDE 9 MG/ML
10 INJECTION INTRAVENOUS DAILY
Status: DISCONTINUED | OUTPATIENT
Start: 2022-11-05 | End: 2022-11-05

## 2022-11-04 RX ADMIN — CEFTRIAXONE 2000 MG: 2 INJECTION, SOLUTION INTRAVENOUS at 22:20

## 2022-11-04 RX ADMIN — ACETAMINOPHEN 650 MG: 325 TABLET, FILM COATED ORAL at 22:22

## 2022-11-04 RX ADMIN — FENTANYL CITRATE 50 MCG: 50 INJECTION INTRAMUSCULAR; INTRAVENOUS at 18:27

## 2022-11-04 RX ADMIN — HYDROMORPHONE HYDROCHLORIDE 0.2 MG: 0.2 INJECTION, SOLUTION INTRAMUSCULAR; INTRAVENOUS; SUBCUTANEOUS at 20:36

## 2022-11-04 RX ADMIN — METRONIDAZOLE 500 MG: 5 INJECTION, SOLUTION INTRAVENOUS at 21:19

## 2022-11-04 RX ADMIN — LIDOCAINE 5% 1 PATCH: 700 PATCH TOPICAL at 22:13

## 2022-11-04 RX ADMIN — IOHEXOL 100 ML: 350 INJECTION, SOLUTION INTRAVENOUS at 16:40

## 2022-11-04 RX ADMIN — PIPERACILLIN AND TAZOBACTAM 3.38 G: 36; 4.5 INJECTION, POWDER, FOR SOLUTION INTRAVENOUS at 15:43

## 2022-11-04 NOTE — ED PROVIDER NOTES
History  Chief Complaint   Patient presents with   • Weakness - Generalized     81-year-old male with PMH of CHF, AFib, DVTs, DM2, HTN, GERD, s/p aortic valve replacement, presents today due to 1 day altered mental status  Upon arrival to the patient was lethargic and mostly incoherent  Patient was repeatedly moaning and groaning, but denied any pain  Most questions were answered with a "no", or a "I don't know "  Patient was unable to answer any questions appropriately  He was recently hospitalized in September due to cholecystitis  A cholecystostomy tube was placed  He was then hospitalized again about a month later due to blockage of the tube  Shortly after, his wife and daughter were bedside and stated that after his 1st hospitalization, he returned to his baseline and was continuing to work  Then after he was hospitalized again he never returned to baseline  His wife stated that his AMS started acutely this morning, but after talking with daughter they state he has been this way since his last discharge  ROS was unable to be performed due to acuity of condition  Prior to Admission Medications   Prescriptions Last Dose Informant Patient Reported? Taking? Acetaminophen (TYLENOL EXTRA STRENGTH PO) 11/4/2022 at Unknown time  Yes Yes   Sig: Take 500 mg by mouth if needed (for pain as needed)   Indications: pain as needed   B Complex-C (SUPER B COMPLEX PO) 11/4/2022 at Unknown time Self Yes Yes   Sig: Take 1 capsule by mouth daily    HYDROcodone-acetaminophen (NORCO) 5-325 mg per tablet Past Week at Unknown time Self Yes Yes   Sig: Take 1 tablet by mouth every 6 (six) hours as needed   Iron Combinations (NIFEREX) TABS 11/4/2022 at Unknown time Self Yes Yes   Sig: Take 1 tablet by mouth in the morning   Multiple Vitamin (MULTIVITAMINS PO) 11/4/2022 at Unknown time Self Yes Yes   Sig: Take 1 tablet by mouth daily   acetaminophen (TYLENOL) 325 mg tablet Not Taking at Unknown time  No No   Sig: Take 3 tablets (975 mg total) by mouth every 8 (eight) hours   Patient not taking: Reported on 2022   albuterol (PROVENTIL HFA,VENTOLIN HFA) 90 mcg/act inhaler Unknown at Unknown time Self Yes No   atorvastatin (LIPITOR) 40 mg tablet 11/3/2022 at Unknown time Self Yes Yes   Sig: Take 40 mg by mouth daily after dinner Atorvastatin Calcium 40 MG Oral Tablet Take 1 tablet daily  Refills: 0  Active    colchicine (COLCRYS) 0 6 mg tablet Unknown at Unknown time Self Yes No   Sig: Take 0 6 mg by mouth as needed for muscle/joint pain   fluticasone (FLONASE) 50 mcg/act nasal spray Not Taking at Unknown time Self Yes No   Si sprays as needed Shake liquid and spray    Patient not taking: Reported on 2022   gabapentin (NEURONTIN) 100 mg capsule Not Taking at Unknown time Self Yes No   Sig: Take 100 mg by mouth daily   Patient not taking: Reported on 2022   metFORMIN (GLUCOPHAGE) 1000 MG tablet 11/3/2022 at Unknown time  Yes Yes   Sig: Take 1,000 mg by mouth daily with dinner   Indications: Type 2 Diabetes   mupirocin (BACTROBAN) 2 % ointment 11/3/2022 at Unknown time Self Yes Yes   Sig: as needed    omeprazole (PriLOSEC) 20 mg delayed release capsule 11/3/2022 at Unknown time Self Yes Yes   Sig: Omeprazole 20 MG Oral Tablet Delayed Release Take 1 tablet daily  Refills: 0  Active   polyethylene glycol (GLYCOLAX) 17 GM/SCOOP powder Not Taking at Unknown time Self Yes No   Sig: Take 17 g by mouth 2 (two) times a day   Patient not taking: Reported on 2022   potassium chloride (K-DUR,KLOR-CON) 20 mEq tablet 11/3/2022 at Unknown time Self Yes Yes   Sig: Take 20 mEq by mouth daily    sodium chloride, PF, 0 9 % 11/3/2022 at Unknown time  No Yes   Sig: 10 mL by Intracatheter route daily Intracatheter flushing daily   sotalol (BETAPACE) 80 mg tablet 2022 at Unknown time  No Yes   Sig: Take 1 tablet (80 mg total) by mouth daily   torsemide (DEMADEX) 20 mg tablet  Self No No   Sig: TAKE 3 TABLETS(60 MG TOTAL) BY MOUTH EVERY DAY   CAN TAKE 40 MG IN THE AM AND 20 MG IN THE AFTERNOON   warfarin (COUMADIN) 2 mg tablet 11/3/2022 at Unknown time  No Yes   Sig: Take 2 tablets (4 mg total) by mouth daily Acknowledge      Facility-Administered Medications: None       Past Medical History:   Diagnosis Date   • Anemia    • Arthritis    • Atrial fibrillation ([facility] [address] )    • Basal cell carcinoma 03/22/2022    Tip of Nose   • CHF (congestive heart failure) (HCC)    • Diabetes mellitus (HCC)     Niddm   • DVT (deep vein thrombosis) in pregnancy 1966    not in pregnancy   • DVT (deep venous thrombosis) ([facility] [address] ) 1966   • Dyslipidemia    • GERD (gastroesophageal reflux disease)    • Hyperlipidemia    • Hypertension    • Hypomagnesemia 10/19/2022   • Irregular heart beat     Afib   • Morbid obesity due to excess calories ([facility] [address] )     Resolved 9/2/2014    • Pulmonary embolism (HCC)    • Sepsis ([facility] )    • Squamous cell skin cancer 07/30/2020    Left posterior scalp   • Visual impairment        Past Surgical History:   Procedure Laterality Date   • AORTIC VALVE REPLACEMENT     • CARDIAC DEFIBRILLATOR PLACEMENT  04/2014   • CARDIAC SURGERY  02/2014    AVR   • COLONOSCOPY     • INSERT / REPLACE / REMOVE PACEMAKER     • IR CHOLECYSTOSTOMY TUBE CHECK/CHANGE/REPOSITION/REINSERTION/UPSIZE  10/13/2022   • IR CHOLECYSTOSTOMY TUBE CHECK/CHANGE/REPOSITION/REINSERTION/UPSIZE  10/19/2022   • IR CHOLECYSTOSTOMY TUBE CHECK/CHANGE/REPOSITION/REINSERTION/UPSIZE  10/27/2022   • IR CHOLECYSTOSTOMY TUBE PLACEMENT  9/1/2022   • JOINT REPLACEMENT Left     LTKR   • MOHS SURGERY  07/30/2020    Left posterior scalp, Dr Boyd   • MOHS SURGERY  04/18/2022    [facility] Tip of Nose- Dr Boyd   • NM LIGATE/STRIP LONG Cleave Drafts SEP-FEM JUNC Right 8/17/2018    Procedure: LEG PERFORATED INJECTION SCLEROTHERAPY;  Surgeon: Juan Carlos Santillan MD;  Location: [facility];  Service: Vascular   • REPLACEMENT TOTAL KNEE Right    • TOTAL KNEE ARTHROPLASTY Left    • VASCULAR SURGERY     • VENA CAVA FILTER PLACEMENT      Interruption inferior vena cava, [place] filter, placement   • WISDOM TOOTH EXTRACTION         Family History   Problem Relation Age of Onset   • Arthritis Mother    • Stroke Mother    • Arthritis Father    • Cancer Father    • Arthritis Daughter      I have reviewed and agree with the history as documented  E-Cigarette/Vaping   • E-Cigarette Use Never User      E-Cigarette/Vaping Substances   • Nicotine No    • THC No    • CBD No    • Other No    • Unknown No      Social History     Tobacco Use   • Smoking status: Never Smoker   • Smokeless tobacco: Never Used   Vaping Use   • Vaping Use: Never used   Substance Use Topics   • Alcohol use: Not Currently     Comment: no alcohol in 28 yrs   • Drug use: Never        Review of Systems   Unable to perform ROS: Acuity of condition       Physical Exam  ED Triage Vitals   Temperature Pulse Respirations Blood Pressure SpO2   11/04/22 1507 11/04/22 1447 11/04/22 1447 11/04/22 1447 11/04/22 1447   99 7 °F (37 6 °C) 97 18 145/71 99 %      Temp Source Heart Rate Source Patient Position - Orthostatic VS BP Location FiO2 (%)   11/04/22 1507 11/04/22 1447 11/04/22 1447 11/04/22 1447 --   Oral Monitor Lying Right arm       Pain Score       11/04/22 1447       No Pain             Orthostatic Vital Signs  Vitals:    11/04/22 1700 11/04/22 1800 11/04/22 1830 11/04/22 1900   BP: 141/60 107/59 109/56 110/59   Pulse: 99 98 79 94   Patient Position - Orthostatic VS: Lying Sitting         Physical Exam  Constitutional:       General: He is in acute distress  Appearance: He is ill-appearing  HENT:      Head: Normocephalic and atraumatic  Right Ear: External ear normal       Left Ear: External ear normal       Nose: Nose normal       Mouth/Throat:      Mouth: Mucous membranes are moist       Pharynx: Oropharynx is clear  Eyes:      Extraocular Movements: Extraocular movements intact  Pupils: Pupils are equal, round, and reactive to light  Cardiovascular:      Rate and Rhythm: Normal rate and regular rhythm  Pulses: Normal pulses  Heart sounds: Normal heart sounds  Pulmonary:      Effort: Pulmonary effort is normal       Breath sounds: Normal breath sounds  Abdominal:      Palpations: Abdomen is soft  Tenderness: There is no abdominal tenderness  Musculoskeletal:         General: Normal range of motion  Cervical back: Normal range of motion and neck supple  No rigidity  Right lower leg: Edema present  Left lower leg: Edema present  Neurological:      Mental Status: He is lethargic, disoriented and confused  Cranial Nerves: Cranial nerves are intact  Sensory: Sensation is intact  Motor: Weakness present  Psychiatric:         Cognition and Memory: Cognition is impaired           ED Medications  Medications   HYDROmorphone HCl (DILAUDID) injection 0 2 mg (has no administration in time range)   cefTRIAXone (ROCEPHIN) IVPB (premix in dextrose) 2,000 mg 50 mL (has no administration in time range)   metroNIDAZOLE (FLAGYL) IVPB (premix) 500 mg 100 mL (has no administration in time range)   piperacillin-tazobactam (ZOSYN) 3 375 g in sodium chloride 0 9 % 100 mL IVPB (0 g Intravenous Stopped 11/4/22 1613)   iohexol (OMNIPAQUE) 350 MG/ML injection (SINGLE-DOSE) 100 mL (100 mL Intravenous Given 11/4/22 1640)   fentanyl citrate (PF) 100 MCG/2ML 50 mcg (50 mcg Intravenous Given 11/4/22 1827)       Diagnostic Studies  Results Reviewed     Procedure Component Value Units Date/Time    HS Troponin I 2hr [[id number]]  (Normal) Collected: 11/04/22 1751    Lab Status: Final result Specimen: Blood from Arm, Right Updated: 11/04/22 1838     hs TnI 2hr 10 ng/L      Delta 2hr hsTnI 0 ng/L     Ammonia [[id number]]  (Normal) Collected: 11/04/22 1751    Lab Status: Final result Specimen: Blood from Arm, Right Updated: 11/04/22 1833     Ammonia 24 umol/L     Urine Microscopic [[id number]]  (Abnormal) Collected: 11/04/22 1704 Lab Status: Final result Specimen: Urine, Straight Cath Updated: 11/04/22 1731     RBC, UA 1-2 /hpf      WBC, UA 1-2 /hpf      Epithelial Cells None Seen /hpf      Bacteria, UA None Seen /hpf      Hyaline Casts, UA 3-5 /lpf     UA w Reflex to Microscopic w Reflex to Culture [[id number]]  (Abnormal) Collected: 11/04/22 1704    Lab Status: Final result Specimen: Urine, Straight Cath Updated: 11/04/22 1715     Color, UA Yellow     Clarity, UA Clear     Specific [place], UA 1 030     pH, UA 6 0     Leukocytes, UA Negative     Nitrite, UA Negative     Protein, UA Trace mg/dl      Glucose, UA Negative mg/dl      Ketones, UA Negative mg/dl      Urobilinogen, UA <2 0 mg/dl      Bilirubin, UA Negative     Occult Blood, UA Negative    B-Type Natriuretic Peptide(BNP) AN, CA, EA Campuses Only [[id number]]  (Abnormal) Collected: 11/04/22 1535    Lab Status: Final result Specimen: Blood from Arm, Right Updated: 11/04/22 1700      pg/mL     FLU/RSV/COVID - if FLU/RSV clinically relevant [[id number]]  (Normal) Collected: 11/04/22 1535    Lab Status: Final result Specimen: Nares from Nose Updated: 11/04/22 1623     SARS-CoV-2 Negative     INFLUENZA A PCR Negative     INFLUENZA B PCR Negative     RSV PCR Negative    Narrative:      FOR PEDIATRIC PATIENTS - copy/paste COVID Guidelines URL to browser: [email or web address]  [email or web address]    SARS-CoV-2 assay is a Nucleic Acid Amplification assay intended for the  qualitative detection of nucleic acid from SARS-CoV-2 in nasopharyngeal  swabs  Results are for the presumptive identification of SARS-CoV-2 RNA  Positive results are indicative of infection with SARS-CoV-2, the virus  causing COVID-19, but do not rule out bacterial infection or co-infection  with other viruses  Laboratories within the [place] and its  territories are required to report all positive results to the appropriate  public health authorities   Negative results do not preclude SARS-CoV-2  infection and should not be used as the sole basis for treatment or other  patient management decisions  Negative results must be combined with  clinical observations, patient history, and epidemiological information  This test has not been FDA cleared or approved  This test has been authorized by FDA under an Emergency Use Authorization  (EUA)  This test is only authorized for the duration of time the  declaration that circumstances exist justifying the authorization of the  emergency use of an in vitro diagnostic tests for detection of SARS-CoV-2  virus and/or diagnosis of COVID-19 infection under section 564(b)(1) of  the Act, 21 U  S C  455FBD-5(V)(9), unless the authorization is terminated  or revoked sooner  The test has been validated but independent review by FDA  and CLIA is pending  Test performed using [employer] GeneXpert: This RT-PCR assay targets N2,  a region unique to SARS-CoV-2  A conserved region in the E-gene was chosen  for pan-Sarbecovirus detection which includes SARS-CoV-2  According to CMS-2020-01-R, this platform meets the definition of high-throughput technology      Procalcitonin [[id number]]  (Normal) Collected: 11/04/22 1535    Lab Status: Final result Specimen: Blood from Arm, Right Updated: 11/04/22 1613     Procalcitonin 0 07 ng/ml     HS Troponin 0hr (reflex protocol) [[id number]]  (Normal) Collected: 11/04/22 1539    Lab Status: Final result Specimen: Blood from Arm, Right Updated: 11/04/22 1610     hs TnI 0hr 10 ng/L     Comprehensive metabolic panel [[id number]]  (Abnormal) Collected: 11/04/22 1535    Lab Status: Final result Specimen: Blood from Arm, Right Updated: 11/04/22 1603     Sodium 135 mmol/L      Potassium 4 0 mmol/L      Chloride 97 mmol/L      CO2 28 mmol/L      ANION GAP 10 mmol/L      BUN 32 mg/dL      Creatinine 1 62 mg/dL      Glucose 128 mg/dL      Calcium 9 2 mg/dL       U/L      ALT 65 U/L      Alkaline Phosphatase 131 U/L Total Protein 7 7 g/dL      Albumin 3 7 g/dL      Total Bilirubin 1 80 mg/dL      eGFR 37 ml/min/1 73sq m     Narrative:      [place] guidelines for Chronic Kidney Disease (CKD):   •  Stage 1 with normal or high GFR (GFR > 90 mL/min/1 73 square meters)  •  Stage 2 Mild CKD (GFR = 60-89 mL/min/1 73 square meters)  •  Stage 3A Moderate CKD (GFR = 45-59 mL/min/1 73 square meters)  •  Stage 3B Moderate CKD (GFR = 30-44 mL/min/1 73 square meters)  •  Stage 4 Severe CKD (GFR = 15-29 mL/min/1 73 square meters)  •  Stage 5 End Stage CKD (GFR <15 mL/min/1 73 square meters)  Note: GFR calculation is accurate only with a steady state creatinine    Lipase [[id number]]  (Normal) Collected: 11/04/22 1535    Lab Status: Final result Specimen: Blood from Arm, Right Updated: 11/04/22 1603     Lipase 42 u/L     APTT [[id number]]  (Abnormal) Collected: 11/04/22 1535    Lab Status: Final result Specimen: Blood from Arm, Right Updated: 11/04/22 1602     PTT 52 seconds     Protime-INR [[id number]]  (Abnormal) Collected: 11/04/22 1535    Lab Status: Final result Specimen: Blood from Arm, Right Updated: 11/04/22 1601     Protime 35 3 seconds      INR 3 49    Lactic acid [[id number]]  (Normal) Collected: 11/04/22 1535    Lab Status: Final result Specimen: Blood from Arm, Right Updated: 11/04/22 1601     LACTIC ACID 1 7 mmol/L     Narrative:      Result may be elevated if tourniquet was used during collection      CBC and differential [[id number]]  (Abnormal) Collected: 11/04/22 1535    Lab Status: Final result Specimen: Blood from Arm, Right Updated: 11/04/22 1547     WBC 8 60 Thousand/uL      RBC 3 70 Million/uL      Hemoglobin 10 0 g/dL      Hematocrit 32 1 %      MCV 87 fL      MCH 27 0 pg      MCHC 31 2 g/dL      RDW 18 6 %      MPV 10 4 fL      Platelets 594 Thousands/uL      nRBC 0 /100 WBCs      Neutrophils Relative 66 %      Immat GRANS % 1 %      Lymphocytes Relative 18 %      Monocytes Relative 12 % Eosinophils Relative 2 %      Basophils Relative 1 %      Neutrophils Absolute 5 79 Thousands/µL      Immature Grans Absolute 0 04 Thousand/uL      Lymphocytes Absolute 1 54 Thousands/µL      Monocytes Absolute 1 02 Thousand/µL      Eosinophils Absolute 0 16 Thousand/µL      Basophils Absolute 0 05 Thousands/µL     Blood culture #2 [[id number]] Collected: 11/04/22 1535    Lab Status: In process Specimen: Blood from Arm, Right Updated: 11/04/22 1541    Blood culture #1 [[id number]] Collected: 11/04/22 1535    Lab Status: In process Specimen: Blood from Arm, Left Updated: 11/04/22 1541                 CT head without contrast   Final Result by Aure Huynh MD (11/04 1651)      No acute intracranial abnormality  Workstation performed: [id number]         CT chest abdomen pelvis w contrast   Final Result by Aure Huynh MD (11/04 1659)      No acute findings in the chest abdomen or pelvis  Transhepatic percutaneous cholecystostomy tube remains in place  Persistent gallbladder inflammatory changes  Workstation performed: [id number]         XR chest 1 view portable   Final Result by Khurram Low MD (11/04 1650)      No acute cardiopulmonary disease                    Workstation performed: [id number]               Procedures  ECG 12 Lead Documentation Only    Date/Time: 11/4/2022 7:43 PM  Performed by: Minor Melchor DO  Authorized by: Minor Melchor DO     Indications / Diagnosis:  AMS  ECG reviewed by me, the ED Provider: yes    Patient location:  ED  Previous ECG:     Previous ECG:  Compared to current    Similarity:  No change    Comparison to cardiac monitor: Yes    Interpretation:     Interpretation: abnormal    Rate:     ECG rate:  115    ECG rate assessment: tachycardic    Rhythm:     Rhythm: sinus rhythm    Ectopy:     Ectopy: PVCs    QRS:     QRS axis:  Normal    QRS intervals:  Normal  Conduction:     Conduction: normal    ST segments:     ST segments:  Normal  T waves:     T waves: normal            ED Course  ED Course as of 11/04/22 1954 Fri Nov 04, 2022   1547 Attempted to contact wife via telephone number in chart  No answer x2   1622 hs TnI 0hr: 10   1622 Lipase: 42   1622 WBC: 8 60   1622 Hemoglobin(!): 10 0   1625 FLU/RSV/COVID - if FLU/RSV clinically relevant  All negative   1656 CT head without contrast  No acute intracranial abnormality   1656 XR chest 1 view portable  No acute cardiopulmonary disease   1706 CT chest abdomen pelvis w contrast  No acute chest abdomen or pelvis abnormalities  Cholecystostomy tube present and continued inflammation in the gallbladder  1707 BNP(!): 195   1718 UA w Reflex to Microscopic w Reflex to Culture(!)  wnl   1948 Ammonia: 24       SBIRT 22yo+    Flowsheet Row Most Recent Value   SBIRT (25 yo +)    In order to provide better care to our patients, we are screening all of our patients for alcohol and drug use  Would it be okay to ask you these screening questions? Yes Filed at: 11/04/2022 1637   Initial Alcohol Screen: US AUDIT-C     1  How often do you have a drink containing alcohol? 0 Filed at: 11/04/2022 1637   2  How many drinks containing alcohol do you have on a typical day you are drinking? 0 Filed at: 11/04/2022 1637   3b  FEMALE Any Age, or MALE 65+: How often do you have 4 or more drinks on one occassion? 0 Filed at: 11/04/2022 1637   Audit-C Score 0 Filed at: 11/04/2022 1637   KRISTIAN: How many times in the past year have you    Used an illegal drug or used a prescription medication for non-medical reasons? Never Filed at: 11/04/2022 1637          MDM  Number of Diagnoses or Management Options  Altered mental status  Weakness  Diagnosis management comments:   Patient presented due to altered mental status after most recent hospitalization approximately 3 weeks ago  Patient was mostly nonresponsive and disoriented for the majority of his time in the ED    He was moaning most of the time but denying pain initially  For workup was initiated including CBC, CMP, troponin, BNP, lipase, UA, Procal, blood cultures, lactic acid, coags, flu/COVID/RSV, CT head, CT C/A/P, CXR which were all largely unremarkable except for slightly elevated transaminase levels and continued inflammation of his gallbladder  Ammonia level sent off which also came back normal   Since this now seems to be more subacute, delirium is high probability  Patient will be admitted for further evaluation and treatment  Disposition  Final diagnoses:   Weakness   Altered mental status     Time reflects when diagnosis was documented in both MDM as applicable and the Disposition within this note     Time User Action Codes Description Comment    11/4/2022  6:38 PM Nikki Prakash Add [R53 1] Weakness     11/4/2022  6:38 PM Nikki Prakash Add [R41 82] Altered mental status     11/4/2022  7:16 PM Estevan Holley Add [K81 9] Cholecystitis       ED Disposition     ED Disposition   Admit    Condition   Stable    Date/Time   Fri Nov 4, 2022  6:38 PM    Comment   Case was discussed with TAMAR and the patient's admission status was agreed to be Admission Status: inpatient status to the service of Dr Susie Segura   Follow-up Information    None         Patient's Medications   Discharge Prescriptions    No medications on file     No discharge procedures on file  PDMP Review     None           ED Provider  Attending physically available and evaluated Corcoransharon Gardner  PRISCILLA managed the patient along with the ED Attending      Electronically Signed by         Donald Mckeon DO  11/04/22 1954

## 2022-11-04 NOTE — TELEPHONE ENCOUNTER
Appointment Confirmation   Person confirmed appointment with  If not patient, name of the person Patient    Date and time of appointment 11/7/22  2:30 pm   Patient acknowledged and will be at appointment? yes    Did you advise the patient that they will need a urine sample if they are a new patient?  N/A    Did you advise the patient to bring their current medications for verification? (including any OTC) Yes    Additional Information

## 2022-11-04 NOTE — H&P
[address] 1935, 81 y o  male MRN: [id number]  Unit/Bed#: [facility] Encounter: [id number]  Primary Care Provider: Chuck Pena DO   Date and time admitted to hospital: 11/4/2022  2:38 PM    RUQ pain  Assessment & Plan  · Patient has a history of acute cholecystitis s/p percutaneous cholecystostomy tube  · Now presenting with LFT elevation and right upper quadrant pain  · Patient appears to be in significant pain with perhaps some confusion  · Ammonia normal]  · CT abdomen/pelvis: No acute findings in the chest abdomen or pelvis Transhepatic percutaneous cholecystostomy tube remains in place  Persistent gallbladder inflammatory changes    · UA negative  · Procalcitonin negative  · Troponin 10, 10   · INR 3 49    Plan:  · Consult Surgery for assessment of perc jeny tube  · Continue IV antibiotics with ceftriaxone and flagyl  · Continue to monitor CMP      Thoracic back pain  Assessment & Plan  · Consider MRI if patient's pain better controlled or increased concern for bone infection    OBINNA (obstructive sleep apnea)  Assessment & Plan  · Continue cpap    Diastolic congestive heart failure (HCC)  Assessment & Plan  Wt Readings from Last 3 Encounters:   11/04/22 133 kg (293 lb 6 9 oz)   10/25/22 (!) 137 kg (302 lb)   10/24/22 (!) 138 kg (305 lb 3 2 oz)     · Hold home diuretic for now  · Reassess volume status daily with daily weight and I/O         Stage 3a chronic kidney disease with elevated creatinine  Assessment & Plan  Lab Results   Component Value Date    EGFR 37 11/04/2022    EGFR 27 10/24/2022    EGFR 22 10/23/2022    CREATININE 1 62 (H) 11/04/2022    CREATININE 2 08 (H) 10/24/2022    CREATININE 2 51 (H) 10/23/2022   · Check AM BMP    DVT (deep venous thrombosis) (HCC)  Assessment & Plan  · S/p IVC filter     Paroxysmal atrial fibrillation (HCC)  Assessment & Plan  · Hold warfarin  · Check INR in AM, can restart if therapeutic  · Continue sotalol    VTE Pharmacologic Prophylaxis: VTE Score: 3 Moderate Risk (Score 3-4) - Pharmacological DVT Prophylaxis Ordered: holding warfarin due to supratherapeutic inr  Code Status: Level 1  Discussion with family: Updated  (wife and daughter) at bedside  Anticipated Length of Stay: Patient will be admitted on an inpatient basis with an anticipated length of stay of greater than 2 midnights secondary to acute encephalopathy  Chief Complaint: Pain     History of Present Illness:  Jaime Gipson is a 81 y o  male with a PMH of atrial fibrillation on warfarin, DVT/PE s/p IVC filter, aortic valve replacement, dCHF, HTN, GERD, DM Type 2 and cholecystitis with placement of percutaneous cholecystostomy tube in 9/2022 who presents with right upper quadrant pain and back pain  Most of history is provided by patient's daughter and wife at bedside  Patient was hospitalized from 8/29/2022 - 9/3/2022 for acute cholecystitis and had a percutaneous jeny tube placed at that time with plan to perform cholecystectomy in approximately 4 months  Patient had his tube replaced on 10/19  Patient's wife reports that he woke up in normal health this morning and had no complaints  Around noontime he went to go take a nap in his recliner  Approximately 30 minutes later he woke up and started moaning in pain  Wife became concerned and his home physical therapist soon arrived  The physical therapist recommended that he be taken to the  hospital for evaluation  In the ED, patient is currently afebrile but he is groaning in pain  He is redirectable when you ask him direct questions repeatedly  He reports pain in his right upper quadrant as well as his thoracic back  He cannot well qualify his pain  We will continue to monitor his LFTs which are elevated from prior  Will hold his warfarin tonight due to his supratherapeutic INR  Will also consult acute surgery for consideration of cholecystectomy    Will continue IV antibiotics  Review of Systems:  Review of Systems   Unable to perform ROS: Mental status change   Gastrointestinal: Positive for abdominal pain  Psychiatric/Behavioral: Positive for confusion         Past Medical and Surgical History:   Past Medical History:   Diagnosis Date   • Anemia    • Arthritis    • Atrial fibrillation ([facility] )    • Basal cell carcinoma 03/22/2022    Tip of Nose   • CHF (congestive heart failure) ([facility])    • Diabetes mellitus (HCC)     Niddm   • DVT (deep vein thrombosis) in pregnancy 1966    not in pregnancy   • DVT (deep venous thrombosis) ([facility] ) 1966   • Dyslipidemia    • GERD (gastroesophageal reflux disease)    • Hyperlipidemia    • Hypertension    • Hypomagnesemia 10/19/2022   • Irregular heart beat     Afib   • Morbid obesity due to excess calories ([facility] )     Resolved 9/2/2014    • Pulmonary embolism (HCC)    • Sepsis ([facility] )    • Squamous cell skin cancer 07/30/2020    Left posterior scalp   • Visual impairment        Past Surgical History:   Procedure Laterality Date   • AORTIC VALVE REPLACEMENT     • CARDIAC DEFIBRILLATOR PLACEMENT  04/2014   • CARDIAC SURGERY  02/2014    AVR   • COLONOSCOPY     • INSERT / REPLACE / REMOVE PACEMAKER     • IR CHOLECYSTOSTOMY TUBE CHECK/CHANGE/REPOSITION/REINSERTION/UPSIZE  10/13/2022   • IR CHOLECYSTOSTOMY TUBE CHECK/CHANGE/REPOSITION/REINSERTION/UPSIZE  10/19/2022   • IR CHOLECYSTOSTOMY TUBE CHECK/CHANGE/REPOSITION/REINSERTION/UPSIZE  10/27/2022   • IR CHOLECYSTOSTOMY TUBE PLACEMENT  9/1/2022   • JOINT REPLACEMENT Left     LTKR   • MOHS SURGERY  07/30/2020    Left posterior scalp, Dr Hilda Young   • MOHS SURGERY  04/18/2022    BCC Tip of Nose- Dr Hilda Young   • AK LIGATE/STRIP LONG [address] SEP-FEM JUNC Right 8/17/2018    Procedure: LEG PERFORATED INJECTION SCLEROTHERAPY;  Surgeon: Janine Garcia MD;  Location: [facility]  MAIN [facility];  Service: Vascular   • REPLACEMENT TOTAL KNEE Right    • TOTAL KNEE ARTHROPLASTY Left    • VASCULAR SURGERY     • VENA CAVA FILTER PLACEMENT      Interruption inferior vena cava, [place] filter, placement   • WISDOM TOOTH EXTRACTION         Meds/Allergies:  Prior to Admission medications    Medication Sig Start Date End Date Taking? Authorizing Provider   Acetaminophen (TYLENOL EXTRA STRENGTH PO) Take 500 mg by mouth if needed (for pain as needed)  Indications: pain as needed    Historical Provider, MD   acetaminophen (TYLENOL) 325 mg tablet Take 3 tablets (975 mg total) by mouth every 8 (eight) hours 10/24/22   Nishi Viera MD   albuterol (PROVENTIL HFA,[facility]) 90 mcg/act inhaler  3/4/21   Historical Provider, MD   atorvastatin (LIPITOR) 40 mg tablet Take 40 mg by mouth daily after dinner Atorvastatin Calcium 40 MG Oral Tablet Take 1 tablet daily  Refills: 0  Active     Historical Provider, MD   B Complex-C (SUPER B COMPLEX PO) Take 1 capsule by mouth daily     Historical Provider, MD   colchicine (COLCRYS) 0 6 mg tablet Take 0 6 mg by mouth as needed for muscle/joint pain    Historical Provider, MD   fluticasone (FLONASE) 50 mcg/act nasal spray 2 sprays as needed Shake liquid and spray  7/7/21   Historical Provider, MD   gabapentin (NEURONTIN) 100 mg capsule Take 100 mg by mouth daily 1/25/22   Historical Provider, MD   HYDROcodone-acetaminophen (NORCO) 5-325 mg per tablet Take 1 tablet by mouth every 6 (six) hours as needed 1/25/22   Historical Provider, MD   Iron Combinations (NIFEREX) TABS Take 1 tablet by mouth in the morning 2/13/18   Historical Provider, MD   metFORMIN (GLUCOPHAGE) 1000 MG tablet Take 1,000 mg by mouth daily with dinner   Indications: Type 2 Diabetes    Historical Provider, MD   Multiple Vitamin (MULTIVITAMINS PO) Take 1 tablet by mouth daily    Historical Provider, MD   mupirocin (BACTROBAN) 2 % ointment as needed  1/7/20   Historical Provider, MD   omeprazole (PriLOSEC) 20 mg delayed release capsule Omeprazole 20 MG Oral Tablet Delayed Release Take 1 tablet daily  Refills: 0  Active Historical Provider, MD   polyethylene glycol (GLYCOLAX) 17 GM/SCOOP powder Take 17 g by mouth 2 (two) times a day    Historical Provider, MD   potassium chloride (K-DUR,KLOR-CON) 20 mEq tablet Take 20 mEq by mouth daily     Historical Provider, MD   sodium chloride, PF, 0 9 % 10 mL by Intracatheter route daily Intracatheter flushing daily 10/24/22 12/23/22  Kathleen Cid MD   sotalol (BETAPACE) 80 mg tablet Take 1 tablet (80 mg total) by mouth daily 10/24/22   Kathleen Cid MD   torsemide (DEMADEX) 20 mg tablet TAKE 3 TABLETS(60 MG TOTAL) BY MOUTH EVERY DAY  CAN TAKE 40 MG IN THE AM AND 20 MG IN THE AFTERNOON 7/18/22   ODESSA Florian   warfarin (COUMADIN) 2 mg tablet Take 2 tablets (4 mg total) by mouth daily Acknowledge 10/24/22   Kathleen Cid MD     I have reviewed home medications with patient family member  Allergies:    Allergies   Allergen Reactions   • Tramadol Other (See Comments)     intolerance       Social History:  Marital Status: /Civil Union   Occupation: Retired  Patient Pre-hospital Living Situation: Home  Patient Pre-hospital Level of Mobility: walks with cane  Patient Pre-hospital Diet Restrictions: Low fat   Substance Use History:   Social History     Substance and Sexual Activity   Alcohol Use Not Currently    Comment: no alcohol in 28 yrs     Social History     Tobacco Use   Smoking Status Never Smoker   Smokeless Tobacco Never Used     Social History     Substance and Sexual Activity   Drug Use Never       Family History:  Family History   Problem Relation Age of Onset   • Arthritis Mother    • Stroke Mother    • Arthritis Father    • Cancer Father    • Arthritis Daughter        Physical Exam:     Vitals:   Blood Pressure: 110/59 (11/04/22 1900)  Pulse: 94 (11/04/22 1900)  Temperature: 99 7 °F (37 6 °C) (11/04/22 1507)  Temp Source: Oral (11/04/22 1507)  Respirations: 18 (11/04/22 1900)  Height: 6' 1" (185 4 cm) (11/04/22 7387)  Weight - Scale: 133 kg (293 lb 6 9 oz) (11/04/22 1447)  SpO2: 94 % (11/04/22 1900)    Physical Exam  Constitutional:       Appearance: He is ill-appearing  HENT:      Head: Normocephalic and atraumatic  Cardiovascular:      Rate and Rhythm: Normal rate and regular rhythm  Heart sounds: Murmur heard  Pulmonary:      Effort: Pulmonary effort is normal       Breath sounds: Normal breath sounds  Abdominal:      General: Bowel sounds are normal       Palpations: Abdomen is soft  Tenderness: There is no abdominal tenderness  Comments: Percutaneous cholecystostomy tube intact, bag showing milky brown tan looking fluid  Musculoskeletal:      Right lower leg: No edema  Left lower leg: No edema  Comments: Midline thoracic tenderness approximately around T5-T7   Skin:     General: Skin is warm and dry  Neurological:      General: No focal deficit present  Mental Status: He is alert and oriented to person, place, and time  Comments: Patient moaning in pain but will answer questions appropriately when redirected      Psychiatric:         Mood and Affect: Mood normal          Behavior: Behavior normal           Additional Data:     Lab Results:  Results from last 7 days   Lab Units 11/04/22  1535   WBC Thousand/uL 8 60   HEMOGLOBIN g/dL 10 0*   HEMATOCRIT % 32 1*   PLATELETS Thousands/uL 387   NEUTROS PCT % 66   LYMPHS PCT % 18   MONOS PCT % 12   EOS PCT % 2     Results from last 7 days   Lab Units 11/04/22  1535   SODIUM mmol/L 135   POTASSIUM mmol/L 4 0   CHLORIDE mmol/L 97   CO2 mmol/L 28   BUN mg/dL 32*   CREATININE mg/dL 1 62*   ANION GAP mmol/L 10   CALCIUM mg/dL 9 2   ALBUMIN g/dL 3 7   TOTAL BILIRUBIN mg/dL 1 80*   ALK PHOS U/L 131*   ALT U/L 65*   AST U/L 108*   GLUCOSE RANDOM mg/dL 128     Results from last 7 days   Lab Units 11/04/22  1535   INR  3 49*             Results from last 7 days   Lab Units 11/04/22  1535   LACTIC ACID mmol/L 1 7   PROCALCITONIN ng/ml 0 07       Imaging: Reviewed radiology reports from this admission including: chest xray, abdominal/pelvic CT and CT head  CT head without contrast   Final Result by Mario Root MD (11/04 1651)      No acute intracranial abnormality  Workstation performed: [id number]         CT chest abdomen pelvis w contrast   Final Result by Mario Root MD (11/04 1659)      No acute findings in the chest abdomen or pelvis  Transhepatic percutaneous cholecystostomy tube remains in place  Persistent gallbladder inflammatory changes  Workstation performed: [id number]         XR chest 1 view portable   Final Result by Zaira Lancaster MD (11/04 1650)      No acute cardiopulmonary disease  Workstation performed: [id number]             EKG and Other Studies Reviewed on Admission:   · EKG: Atrial fibrillation  ** Please Note: This note has been constructed using a voice recognition system   **

## 2022-11-04 NOTE — CASE COMMUNICATION
Arrived to patient home during scheduled apt time  Daughter meets PTA in driveway reporting that her father has not been himself since breakfast  Reports pt slurring his words and occasionally moaning  PTA greets pt and spouse inside home and notices the same sx  Vitals taken    70   O2 saturation 90%   Pulse rate 85 bpm    Based off of pt sx, PTA recommends pt seek medical attention immediately  Family telephones for an [address] e  Pt taken to [employer]

## 2022-11-04 NOTE — ED ATTENDING ATTESTATION
11/4/2022  I, Mare Brittle, MD, saw and evaluated the patient  I have discussed the patient with the resident/non-physician practitioner and agree with the resident's/non-physician practitioner's findings, Plan of Care, and MDM as documented in the resident's/non-physician practitioner's note, except where noted  All available labs and Radiology studies were reviewed  I was present for key portions of any procedure(s) performed by the resident/non-physician practitioner and I was immediately available to provide assistance  At this point I agree with the current assessment done in the Emergency Department  I have conducted an independent evaluation of this patient a history and physical is as follows:    67-year-old male with history of AFib on Coumadin, prior aortic valve replacement, AICD in place, CHF, hypertension, hyperlipidemia, DVT and PE with IVC filter in place, recent cholecystitis with placement of percutaneous cholecystectomy tube on September 1st requiring IR tube exchange on October 19th for failure to drain  Patient presents from home with acute altered mental status that started this morning  History is provided by the patient's wife and daughter  They state that this morning the patient began moaning and he saying “help me help me ” The patient denies any areas of pain  At the time of arrival to the emergency department the patient has a nonfocal exam, moves all 4 extremities but is generally weak, face symmetric, speech clear, however is oriented only to person and knows that he is in the hospital   When asked all other questions he states “I can not remember ”    Patient denies any areas of pain however is moaning  Specifically, he denies headache, chest pain, abdominal pain, or pain in his back or extremities however appears to be uncomfortable at the time of my evaluation  Physical Exam  Vitals and nursing note reviewed     Constitutional:       General: He is in acute distress  Appearance: He is well-developed  He is ill-appearing  He is not diaphoretic  Comments: Patient moaning and stating “help me ”   HENT:      Head: Normocephalic and atraumatic  Right Ear: External ear normal       Left Ear: External ear normal       Nose: Nose normal    Eyes:      Extraocular Movements: Extraocular movements intact  Conjunctiva/sclera: Conjunctivae normal       Pupils: Pupils are equal, round, and reactive to light  Cardiovascular:      Rate and Rhythm: Regular rhythm  Tachycardia present  Pulses: Normal pulses  Heart sounds: Normal heart sounds  No murmur heard  No friction rub  No gallop  Pulmonary:      Effort: Pulmonary effort is normal  No respiratory distress  Breath sounds: Rales (scattered) present  No wheezing  Abdominal:      General: Bowel sounds are normal  There is no distension  Palpations: Abdomen is soft  Tenderness: There is no abdominal tenderness  There is no guarding  Comments: Abdomen soft and pt denies pain, however he moans with generalized palpation of the abdomen  Cholecystostomy tube in place, yellow bilious drainage in the bag  No surrounding erythema or drainage  Genitourinary:     Comments: No rash to the perineum  No sacral decubitus ulcer  Musculoskeletal:         General: No deformity  Normal range of motion  Cervical back: Normal range of motion and neck supple  Comments: 2+ pitting edema to the bilateral LEs up the knees with overlying venous stasis changes  Superficial wound over the R medial malleolus with scant white drainage  No associated TTP  Skin:     General: Skin is warm and dry  Neurological:      Mental Status: He is alert  Motor: No abnormal muscle tone  Comments: Oriented to person and place easily  Face symmetric  Generally weak but moves all 4 extremities with equal strength and follows commands with extensive prompting  Speech clear             ED Course  ED Course as of 11/04/22 1931   Viridiana Gold Nov 04, 2022   1629 I personally interpreted the patient's EKG which has a poor baseline but was the best EKG obtainable by the machine  Patient has sinus tachycardia to 115 beats per minutes, left bundle branch block unchanged from prior, S no obvious ST segment deviation  Initial troponin is 10, will continue to trend  36 COVID, flu, and RSV negative  CMP with mild improvement in renal function with GFR of 37  INR is therapeutic at 3 49  Patient is satting well on room air, is has an IVC filter and supratherapeutic INR, doubt PE  It is CBC with no leukocytosis  No lactic acidosis or procalcitonin elevation  1630 Patient denies tenderness to palpation of the abdomen but moans when I palpate his abdomen  There is yellow bilious drainage in the bag coming from his cholecystostomy tube  36 Doubt CVA given nonfocal neurologic exam but will obtain CT scan of the head as well as scan of the chest, abdomen, and pelvis for further evaluation  UA ordered  Patient empirically treated with Zosyn for possible infectious source as the underlying cause of his encephalopathy  1733 CT head with no acute intracranial abnormality  0 CT scan of the chest, abdomen, and pelvis with persistent inflammatory changes around the gallbladder without acute findings  1929 Ammonia level is within normal limits  Patient's daughter arrives at bedside who now gives history that his confusion and cognitive decline has been more subacute since he was discharged from the hospital   Concern at this point is high for delirium  Will admit for further workup and management            Critical Care Time  Procedures

## 2022-11-05 PROBLEM — K81.2 ACUTE ON CHRONIC CHOLECYSTITIS: Status: RESOLVED | Noted: 2022-11-05 | Resolved: 2022-11-05

## 2022-11-05 PROBLEM — K81.2 ACUTE ON CHRONIC CHOLECYSTITIS: Status: ACTIVE | Noted: 2022-11-05

## 2022-11-05 PROBLEM — K81.2 ACUTE ON CHRONIC CHOLECYSTITIS: Status: ACTIVE | Noted: 2022-11-04

## 2022-11-05 LAB
ALBUMIN SERPL BCP-MCNC: 3.2 G/DL (ref 3.5–5)
ALP SERPL-CCNC: 107 U/L (ref 34–104)
ALT SERPL W P-5'-P-CCNC: 50 U/L (ref 7–52)
ANION GAP SERPL CALCULATED.3IONS-SCNC: 9 MMOL/L (ref 4–13)
AST SERPL W P-5'-P-CCNC: 63 U/L (ref 13–39)
BASOPHILS # BLD AUTO: 0.04 THOUSANDS/ÂΜL (ref 0–0.1)
BASOPHILS NFR BLD AUTO: 0 % (ref 0–1)
BILIRUB SERPL-MCNC: 1.92 MG/DL (ref 0.2–1)
BUN SERPL-MCNC: 36 MG/DL (ref 5–25)
CALCIUM ALBUM COR SERPL-MCNC: 9.5 MG/DL (ref 8.3–10.1)
CALCIUM SERPL-MCNC: 8.9 MG/DL (ref 8.4–10.2)
CHLORIDE SERPL-SCNC: 100 MMOL/L (ref 96–108)
CO2 SERPL-SCNC: 28 MMOL/L (ref 21–32)
CREAT SERPL-MCNC: 1.75 MG/DL (ref 0.6–1.3)
EOSINOPHIL # BLD AUTO: 0.07 THOUSAND/ÂΜL (ref 0–0.61)
EOSINOPHIL NFR BLD AUTO: 1 % (ref 0–6)
ERYTHROCYTE [DISTWIDTH] IN BLOOD BY AUTOMATED COUNT: 18.6 % (ref 11.6–15.1)
GFR SERPL CREATININE-BSD FRML MDRD: 34 ML/MIN/1.73SQ M
GLUCOSE SERPL-MCNC: 118 MG/DL (ref 65–140)
HCT VFR BLD AUTO: 26.2 % (ref 36.5–49.3)
HGB BLD-MCNC: 8.3 G/DL (ref 12–17)
IMM GRANULOCYTES # BLD AUTO: 0.05 THOUSAND/UL (ref 0–0.2)
IMM GRANULOCYTES NFR BLD AUTO: 1 % (ref 0–2)
INR PPP: 4.06 (ref 0.84–1.19)
LYMPHOCYTES # BLD AUTO: 1.45 THOUSANDS/ÂΜL (ref 0.6–4.47)
LYMPHOCYTES NFR BLD AUTO: 16 % (ref 14–44)
MCH RBC QN AUTO: 27.2 PG (ref 26.8–34.3)
MCHC RBC AUTO-ENTMCNC: 31.7 G/DL (ref 31.4–37.4)
MCV RBC AUTO: 86 FL (ref 82–98)
MONOCYTES # BLD AUTO: 1.28 THOUSAND/ÂΜL (ref 0.17–1.22)
MONOCYTES NFR BLD AUTO: 14 % (ref 4–12)
NEUTROPHILS # BLD AUTO: 6.36 THOUSANDS/ÂΜL (ref 1.85–7.62)
NEUTS SEG NFR BLD AUTO: 68 % (ref 43–75)
NRBC BLD AUTO-RTO: 0 /100 WBCS
PLATELET # BLD AUTO: 277 THOUSANDS/UL (ref 149–390)
PMV BLD AUTO: 9.6 FL (ref 8.9–12.7)
POTASSIUM SERPL-SCNC: 3.7 MMOL/L (ref 3.5–5.3)
PROT SERPL-MCNC: 6.8 G/DL (ref 6.4–8.4)
PROTHROMBIN TIME: 39.7 SECONDS (ref 11.6–14.5)
RBC # BLD AUTO: 3.05 MILLION/UL (ref 3.88–5.62)
SODIUM SERPL-SCNC: 137 MMOL/L (ref 135–147)
WBC # BLD AUTO: 9.25 THOUSAND/UL (ref 4.31–10.16)

## 2022-11-05 PROCEDURE — 99232 SBSQ HOSP IP/OBS MODERATE 35: CPT | Performed by: HOSPITALIST

## 2022-11-05 PROCEDURE — G0008 ADMIN INFLUENZA VIRUS VAC: HCPCS | Performed by: INTERNAL MEDICINE

## 2022-11-05 PROCEDURE — 85610 PROTHROMBIN TIME: CPT | Performed by: INTERNAL MEDICINE

## 2022-11-05 PROCEDURE — 99232 SBSQ HOSP IP/OBS MODERATE 35: CPT | Performed by: SURGERY

## 2022-11-05 PROCEDURE — 99223 1ST HOSP IP/OBS HIGH 75: CPT | Performed by: INTERNAL MEDICINE

## 2022-11-05 PROCEDURE — 90662 IIV NO PRSV INCREASED AG IM: CPT | Performed by: INTERNAL MEDICINE

## 2022-11-05 PROCEDURE — 80053 COMPREHEN METABOLIC PANEL: CPT | Performed by: INTERNAL MEDICINE

## 2022-11-05 PROCEDURE — NC001 PR NO CHARGE: Performed by: RADIOLOGY

## 2022-11-05 PROCEDURE — 85025 COMPLETE CBC W/AUTO DIFF WBC: CPT | Performed by: INTERNAL MEDICINE

## 2022-11-05 RX ORDER — GABAPENTIN 100 MG/1
100 CAPSULE ORAL
Status: DISCONTINUED | OUTPATIENT
Start: 2022-11-05 | End: 2022-11-07

## 2022-11-05 RX ORDER — COLCHICINE 0.6 MG/1
0.6 TABLET ORAL DAILY PRN
Status: DISCONTINUED | OUTPATIENT
Start: 2022-11-05 | End: 2022-11-06

## 2022-11-05 RX ORDER — OXYCODONE HYDROCHLORIDE 5 MG/1
2.5 TABLET ORAL EVERY 6 HOURS PRN
Status: DISCONTINUED | OUTPATIENT
Start: 2022-11-05 | End: 2022-11-14 | Stop reason: HOSPADM

## 2022-11-05 RX ORDER — FUROSEMIDE 10 MG/ML
40 INJECTION INTRAMUSCULAR; INTRAVENOUS ONCE
Status: COMPLETED | OUTPATIENT
Start: 2022-11-05 | End: 2022-11-05

## 2022-11-05 RX ORDER — SODIUM CHLORIDE 9 MG/ML
5 INJECTION INTRAVENOUS DAILY
Status: DISCONTINUED | OUTPATIENT
Start: 2022-11-06 | End: 2022-11-14 | Stop reason: HOSPADM

## 2022-11-05 RX ORDER — OXYCODONE HYDROCHLORIDE 5 MG/1
5 TABLET ORAL EVERY 6 HOURS PRN
Status: DISCONTINUED | OUTPATIENT
Start: 2022-11-05 | End: 2022-11-14 | Stop reason: HOSPADM

## 2022-11-05 RX ADMIN — METRONIDAZOLE 500 MG: 5 INJECTION, SOLUTION INTRAVENOUS at 03:39

## 2022-11-05 RX ADMIN — HYDROMORPHONE HYDROCHLORIDE 0.2 MG: 0.2 INJECTION, SOLUTION INTRAMUSCULAR; INTRAVENOUS; SUBCUTANEOUS at 21:49

## 2022-11-05 RX ADMIN — HYDROMORPHONE HYDROCHLORIDE 0.2 MG: 0.2 INJECTION, SOLUTION INTRAMUSCULAR; INTRAVENOUS; SUBCUTANEOUS at 12:41

## 2022-11-05 RX ADMIN — SOTALOL HYDROCHLORIDE 80 MG: 80 TABLET ORAL at 08:38

## 2022-11-05 RX ADMIN — HYDROMORPHONE HYDROCHLORIDE 0.2 MG: 0.2 INJECTION, SOLUTION INTRAMUSCULAR; INTRAVENOUS; SUBCUTANEOUS at 04:38

## 2022-11-05 RX ADMIN — COLCHICINE 0.6 MG: 0.6 TABLET ORAL at 04:05

## 2022-11-05 RX ADMIN — ACETAMINOPHEN 650 MG: 325 TABLET, FILM COATED ORAL at 11:51

## 2022-11-05 RX ADMIN — PANTOPRAZOLE SODIUM 40 MG: 40 TABLET, DELAYED RELEASE ORAL at 06:22

## 2022-11-05 RX ADMIN — FUROSEMIDE 40 MG: 10 INJECTION, SOLUTION INTRAMUSCULAR; INTRAVENOUS at 16:04

## 2022-11-05 RX ADMIN — HYDROMORPHONE HYDROCHLORIDE 0.2 MG: 0.2 INJECTION, SOLUTION INTRAMUSCULAR; INTRAVENOUS; SUBCUTANEOUS at 16:43

## 2022-11-05 RX ADMIN — CEFTRIAXONE 2000 MG: 2 INJECTION, SOLUTION INTRAVENOUS at 19:35

## 2022-11-05 RX ADMIN — GABAPENTIN 100 MG: 100 CAPSULE ORAL at 21:49

## 2022-11-05 RX ADMIN — ACETAMINOPHEN 650 MG: 325 TABLET, FILM COATED ORAL at 06:22

## 2022-11-05 RX ADMIN — METRONIDAZOLE 500 MG: 5 INJECTION, SOLUTION INTRAVENOUS at 10:58

## 2022-11-05 RX ADMIN — SODIUM CHLORIDE 10 ML: 9 INJECTION INTRAMUSCULAR; INTRAVENOUS; SUBCUTANEOUS at 08:38

## 2022-11-05 RX ADMIN — HYDROMORPHONE HYDROCHLORIDE 0.2 MG: 0.2 INJECTION, SOLUTION INTRAMUSCULAR; INTRAVENOUS; SUBCUTANEOUS at 00:40

## 2022-11-05 RX ADMIN — INFLUENZA A VIRUS A/VICTORIA/2570/2019 IVR-215 (H1N1) ANTIGEN (FORMALDEHYDE INACTIVATED), INFLUENZA A VIRUS A/DARWIN/9/2021 SAN-010 (H3N2) ANTIGEN (FORMALDEHYDE INACTIVATED), INFLUENZA B VIRUS B/PHUKET/3073/2013 ANTIGEN (FORMALDEHYDE INACTIVATED), AND INFLUENZA B VIRUS B/MICHIGAN/01/2021 ANTIGEN (FORMALDEHYDE INACTIVATED) 0.7 ML: 60; 60; 60; 60 INJECTION, SUSPENSION INTRAMUSCULAR at 09:03

## 2022-11-05 RX ADMIN — ACETAMINOPHEN 650 MG: 325 TABLET, FILM COATED ORAL at 17:40

## 2022-11-05 RX ADMIN — OXYCODONE HYDROCHLORIDE 5 MG: 5 TABLET ORAL at 19:48

## 2022-11-05 RX ADMIN — METRONIDAZOLE 500 MG: 5 INJECTION, SOLUTION INTRAVENOUS at 19:35

## 2022-11-05 NOTE — ASSESSMENT & PLAN NOTE
· Patient has a history of acute cholecystitis s/p percutaneous cholecystostomy tube  · Now presenting with LFT elevation and right upper quadrant pain  · Patient appears to be in significant pain with perhaps some confusion  · Ammonia normal]  · CT abdomen/pelvis: No acute findings in the chest abdomen or pelvis Transhepatic percutaneous cholecystostomy tube remains in place  Persistent gallbladder inflammatory changes    · UA negative  · Procalcitonin negative  · Troponin 10, 10   · INR 3 49    Plan:  · Consult Surgery for assessment of perc jeny tube  · Continue IV antibiotics with ceftriaxone and flagyl  · Continue to monitor CMP

## 2022-11-05 NOTE — CONSULTS
Acute Care Surgery  Consultation  Assessment:  81year-old male with 1 day of altered mental status  History of chronic cholecystitis status post percutaneous cholecystostomy tube placement initially on 09/01, exchanged on 10/19 by IR during patient admission from 10/18-10/24, exchanged again on 10/27 by IR  Diastolic congestive heart failure  Stage III CKD  History of AFib, on warfarin-currently supratherapeutic INR    Plan:  • Recommend IR consult for tube check  • Although normal WBC and procal, patient has elevated LFTs and worsening AMS, would consult GI to rule out cholangitis + assess patient's new onset dysphagia   • IV Rocephin/Flagyl per SLIM  • Given chronicity of cholecystitis, patient would be a high risk surgery  Recommend continuing percutaneous cholecystostomy drain until inflammation has decreased and follow-up with interval cholecystectomy  If surgery would be attempted at this time per patient, he would be a high likelihood for open cholecystectomy and high risk for morbidity (damage to CBD, bowel) and mortality   • Recommend DVT ppx      History of Present Illness   HPI:  Usman Farias is a 81 y o  male with a past medical history of chronic cholecystitis status post percutaneous cholecystostomy tube placement, congestive heart failure, atrial fibrillation on warfarin, DVT, GERD, hypertension, hyperlipidemia, morbid obesity who presents with 1 day of altered mental status  Patient was reported to be normal earlier this morning, however subsequently developed word-finding difficulties and acute changes in his memory  He was reported to be unable to remember his name, birthday  Per family, patient was fully functional and healthy prior to perc jeny drain placement but has since experienced weakness, change in appetite, difficulty swallowing  Family states that he seems to be choking on food, decrease in p o  Intake    He was previously seen and admitted from 10/18 to 10/24 after presenting for leakage around his perc jeny drain  His drain was interrogated by IR which was found to be clogged and was subsequently replaced  After discharge, he experienced decreased output from the drain and was seen by IR outpatient on 10/27 in which they noted the drain to be in good position  At that time they change the drain a 2nd time  He reports minimal abdominal pain, reports he does have some back pain but appears to be an unreliable historian at bedside  Denies nausea, vomiting, chest pain  Family does report that he has had increased swelling in bilateral lower extremities and difficulty walking secondary to this  Review of Systems   Constitutional: Positive for activity change and appetite change  Negative for chills, fever and unexpected weight change  HENT: Positive for trouble swallowing  Negative for voice change  Eyes: Negative  Respiratory: Negative for cough and shortness of breath  Cardiovascular: Positive for leg swelling  Negative for chest pain  Gastrointestinal: Positive for abdominal pain  Negative for abdominal distention, diarrhea, nausea and vomiting  Musculoskeletal: Positive for back pain  Neurological: Positive for speech difficulty and weakness  Psychiatric/Behavioral: Positive for confusion  Negative for agitation and behavioral problems  All other systems reviewed and are negative        Historical Information   Past Medical History:   Diagnosis Date   • Anemia    • Arthritis    • Atrial fibrillation ([facility] [address] )    • Basal cell carcinoma 03/22/2022    Tip of Nose   • CHF (congestive heart failure) (HCC)    • Diabetes mellitus (HCC)     Niddm   • DVT (deep vein thrombosis) in pregnancy 1966    not in pregnancy   • DVT (deep venous thrombosis) ([facility] [address] ) 1966   • Dyslipidemia    • GERD (gastroesophageal reflux disease)    • Hyperlipidemia    • Hypertension    • Hypomagnesemia 10/19/2022   • Irregular heart beat     Afib   • Morbid obesity due to excess calories ([facility] [address] ) Resolved 9/2/2014    • Pulmonary embolism (HCC)    • Sepsis ([address] )    • Squamous cell skin cancer 07/30/2020    Left posterior scalp   • Visual impairment      Past Surgical History:   Procedure Laterality Date   • AORTIC VALVE REPLACEMENT     • CARDIAC DEFIBRILLATOR PLACEMENT  04/2014   • CARDIAC SURGERY  02/2014    AVR   • COLONOSCOPY     • INSERT / REPLACE / REMOVE PACEMAKER     • IR CHOLECYSTOSTOMY TUBE CHECK/CHANGE/REPOSITION/REINSERTION/UPSIZE  10/13/2022   • IR CHOLECYSTOSTOMY TUBE CHECK/CHANGE/REPOSITION/REINSERTION/UPSIZE  10/19/2022   • IR CHOLECYSTOSTOMY TUBE CHECK/CHANGE/REPOSITION/REINSERTION/UPSIZE  10/27/2022   • IR CHOLECYSTOSTOMY TUBE PLACEMENT  9/1/2022   • JOINT REPLACEMENT Left     LTKR   • MOHS SURGERY  07/30/2020    Left posterior scalp, Dr Michael العلي   • [address]  04/18/2022    [address] Tip of Nose- Dr Michael العلي   • AK LIGATE/STRIP LONG [address] SEP-FEM JUNC Right 8/17/2018    Procedure: LEG PERFORATED INJECTION SCLEROTHERAPY;  Surgeon: Manolo Hernández MD;  Location: [facility];  Service: Vascular   • REPLACEMENT TOTAL KNEE Right    • TOTAL KNEE ARTHROPLASTY Left    • VASCULAR SURGERY     • VENA CAVA FILTER PLACEMENT      Interruption inferior vena cava, [place] filter, placement   • WISDOM TOOTH EXTRACTION       Social History   Social History     Substance and Sexual Activity   Alcohol Use Not Currently    Comment: no alcohol in 28 yrs     Social History     Substance and Sexual Activity   Drug Use Never     Social History     Tobacco Use   Smoking Status Never Smoker   Smokeless Tobacco Never Used     Family History   Problem Relation Age of Onset   • Arthritis Mother    • Stroke Mother    • Arthritis Father    • Cancer Father    • Arthritis Daughter        Meds/Allergies   all current active meds have been reviewed  Allergies   Allergen Reactions   • Tramadol Other (See Comments)     intolerance       Objective   First Vitals:   Blood Pressure: 145/71 (11/04/22 1447)  Pulse: 97 (11/04/22 1447)  Temperature: 99 7 °F (37 6 °C) (11/04/22 1507)  Temp Source: Oral (11/04/22 1507)  Respirations: 18 (11/04/22 1447)  Height: 6' 1" (185 4 cm) (11/04/22 1447)  Weight - Scale: 133 kg (293 lb 6 9 oz) (11/04/22 1447)  SpO2: 99 % (11/04/22 1447)    Current Vitals:   Blood Pressure: 110/59 (11/04/22 1900)  Pulse: 94 (11/04/22 1900)  Temperature: 99 7 °F (37 6 °C) (11/04/22 1507)  Temp Source: Oral (11/04/22 1507)  Respirations: 18 (11/04/22 1900)  Height: 6' 1" (185 4 cm) (11/04/22 1447)  Weight - Scale: 133 kg (293 lb 6 9 oz) (11/04/22 1447)  SpO2: 94 % (11/04/22 1900)      Intake/Output Summary (Last 24 hours) at 11/4/2022 2207  Last data filed at 11/4/2022 1613  Gross per 24 hour   Intake 100 ml   Output --   Net 100 ml       Invasive Devices  Report    Peripheral Intravenous Line  Duration           Peripheral IV 11/04/22 Right Antecubital <1 day                Physical Exam  Constitutional:       General: He is not in acute distress  Appearance: He is obese  He is ill-appearing  HENT:      Head: Normocephalic and atraumatic  Mouth/Throat:      Mouth: Mucous membranes are moist       Pharynx: Oropharynx is clear  Eyes:      Conjunctiva/sclera: Conjunctivae normal    Cardiovascular:      Rate and Rhythm: Normal rate and regular rhythm  Pulmonary:      Effort: Pulmonary effort is normal  No respiratory distress  Abdominal:      General: Abdomen is flat  There is no distension (minimal distension on palpaption  Initially reports tenderness throughout abdomen, however, on second evaluation patient is non tender)  Palpations: Abdomen is soft  Tenderness: There is abdominal tenderness (minimal distension on palpaption  Initially reports tenderness throughout abdomen, however, on second evaluation patient is non tender)  There is no guarding or rebound  Musculoskeletal:      Cervical back: Neck supple  Right lower leg: Edema present  Left lower leg: Edema present  Skin:     General: Skin is warm and dry  Coloration: Skin is not jaundiced  Comments: B/l lower extremity venous stasis changes   Neurological:      Mental Status: He is alert  He is disoriented  Lab Results:   CBC:   Lab Results   Component Value Date    WBC 8 60 11/04/2022    HGB 10 0 (L) 11/04/2022    HCT 32 1 (L) 11/04/2022    MCV 87 11/04/2022     11/04/2022    MCH 27 0 11/04/2022    MCHC 31 2 (L) 11/04/2022    RDW 18 6 (H) 11/04/2022    MPV 10 4 11/04/2022    NRBC 0 11/04/2022   , CMP:   Lab Results   Component Value Date    SODIUM 135 11/04/2022    K 4 0 11/04/2022    CL 97 11/04/2022    CO2 28 11/04/2022    BUN 32 (H) 11/04/2022    CREATININE 1 62 (H) 11/04/2022    CALCIUM 9 2 11/04/2022     (H) 11/04/2022    ALT 65 (H) 11/04/2022    ALKPHOS 131 (H) 11/04/2022    EGFR 37 11/04/2022     Imaging:   CT head without contrast   Final Result by Janette Olsen MD (11/04 1651)      No acute intracranial abnormality  Workstation performed: [id number]         CT chest abdomen pelvis w contrast   Final Result by Janette Olsen MD (11/04 1659)      No acute findings in the chest abdomen or pelvis  Transhepatic percutaneous cholecystostomy tube remains in place  Persistent gallbladder inflammatory changes  Workstation performed: [id number]         XR chest 1 view portable   Final Result by Nhi Delgado MD (11/04 1650)      No acute cardiopulmonary disease  Workstation performed: [id number]             EKG, Pathology, and Other Studies: I have personally reviewed pertinent reports  Counseling / Coordination of Care  Total floor / unit time spent today 30 minutes  Greater than 50% of total time was spent with the patient and / or family counseling and / or coordination of care      Misty Marie  11/4/2022 10:07 PM

## 2022-11-05 NOTE — PROGRESS NOTES
[facility]  Progress Note - Carla Parker 1935, 81 y o  male MRN: [id number]  Unit/Bed#: S -01 Encounter: [id number]  Primary Care Provider: Dev Velázquez DO   Date and time admitted to hospital: 11/4/2022  2:38 PM    Acute on chronic cholecystitis  Assessment & Plan  · Patient has a history of acute cholecystitis s/p percutaneous cholecystostomy tube  · Now presenting with LFT elevation and right upper quadrant pain  · Patient appears to be in significant pain with perhaps some confusion  · Ammonia normal]  · CT abdomen/pelvis: No acute findings in the chest abdomen or pelvis Transhepatic percutaneous cholecystostomy tube remains in place  Persistent gallbladder inflammatory changes  · UA negative  · Procalcitonin negative  · Troponin 10, 10   · INR 3 49    Plan:  · Surgery consulted, suggested IR evaluation  IR consult, plan to evaluate  · GI was consulted regarding the possibility of acute cholangitis  · Continue IV antibiotics with ceftriaxone and flagyl  Patient had had symptomatic improvement  · Continue to monitor CMP      Diastolic congestive heart failure [facility])  Assessment & Plan  Wt Readings from Last 3 Encounters:   11/04/22 133 kg (293 lb 6 9 oz)   10/25/22 (!) 137 kg (302 lb)   10/24/22 (!) 138 kg (305 lb 3 2 oz)     · Resumed home Lasix    · Reassess volume status daily with daily weight and I/O         Stage 3a chronic kidney disease with elevated creatinine  Assessment & Plan  Lab Results   Component Value Date    EGFR 34 11/05/2022    EGFR 37 11/04/2022    EGFR 27 10/24/2022    CREATININE 1 75 (H) 11/05/2022    CREATININE 1 62 (H) 11/04/2022    CREATININE 2 08 (H) 10/24/2022   · Following BMP    Paroxysmal atrial fibrillation (HCC)  Assessment & Plan  · Hold warfarin, INR still supratherapeutic at 4 06  · Continue sotalol    Thoracic back pain  Assessment & Plan  · Consider MRI if patient's pain better controlled or increased concern for bone infection  · He did not complain of back pain on 2022  OBINNA (obstructive sleep apnea)  Assessment & Plan  · Continue cpap    DVT (deep venous thrombosis) ([facility])  Assessment & Plan  · S/p IVC filter   · Warfarin held as above  VTE Pharmacologic Prophylaxis: VTE Score: 3 Moderate Risk (Score 3-4) - Pharmacological DVT Prophylaxis Contraindicated  Sequential Compression Devices Ordered  Supratherapeutic INR    Patient Centered Rounds: I performed bedside rounds with nursing staff today  Discussions with Specialists or Other Care Team Provider:  IR, GI  Education and Discussions with Family / Patient: Updated  (wife and daughter) at bedside  Current Length of Stay: 1 day(s)  Current Patient Status: Inpatient   Discharge Plan: Anticipate discharge in 48-72 hrs to home  Code Status: Level 1 - Full Code    Subjective: The patient had no acute events overnight  This morning, he was feeling significantly improved from yesterday, with less right upper quadrant pain, and no complaints of back pain  He does state that he has pain with dorsiflexion of his bilateral feet and increased swelling of his legs  He denies any shortness of breath, chest pain, fevers, chills, or nausea or vomiting or diarrhea  Objective:     Vitals:   Temp (24hrs), Av 1 °F (37 8 °C), Min:99 6 °F (37 6 °C), Max:101 °F (38 3 °C)    Temp:  [99 6 °F (37 6 °C)-101 °F (38 3 °C)] 99 9 °F (37 7 °C)  HR:  [79-99] 82  Resp:  [16-20] 18  BP: (107-145)/(56-71) 110/56  SpO2:  [94 %-99 %] 94 %  Body mass index is 38 71 kg/m²  Input and Output Summary (last 24 hours): Intake/Output Summary (Last 24 hours) at 2022 1429  Last data filed at 2022 1300  Gross per 24 hour   Intake 110 ml   Output --   Net 110 ml       Physical Exam:   Physical Exam  Constitutional:       General: He is not in acute distress  Appearance: He is not toxic-appearing  HENT:      Head: Normocephalic and atraumatic  Nose: Nose normal       Mouth/Throat:      Mouth: Mucous membranes are moist       Pharynx: No oropharyngeal exudate or posterior oropharyngeal erythema  Eyes:      Conjunctiva/sclera: Conjunctivae normal       Pupils: Pupils are equal, round, and reactive to light  Cardiovascular:      Rate and Rhythm: Normal rate and regular rhythm  Pulses: Normal pulses  Heart sounds: Normal heart sounds  Pulmonary:      Breath sounds: Normal breath sounds  No wheezing, rhonchi or rales  Abdominal:      General: Bowel sounds are normal  There is no distension  Palpations: Abdomen is soft  Tenderness: There is abdominal tenderness  Comments: He has some tenderness when the ostomy site is manipulated, but no tenderness otherwise around his abdomen  Musculoskeletal:         General: No deformity  Cervical back: Neck supple  Right lower leg: Edema present  Left lower leg: Edema present  Comments: Tenderness on dorsiflexion of the bilateral feet  Skin:     General: Skin is warm and dry  Comments: Evidence of chronic venous stasis in the lower leg  Neurological:      General: No focal deficit present  Mental Status: He is alert and oriented to person, place, and time  Psychiatric:         Mood and Affect: Mood normal          Behavior: Behavior normal       Comments: The patient's wife confirms that the patient's mood and personality are at his baseline this morning, but states that he was not yesterday            Additional Data:     Labs:  Results from last 7 days   Lab Units 11/05/22  0754   WBC Thousand/uL 9 25   HEMOGLOBIN g/dL 8 3*   HEMATOCRIT % 26 2*   PLATELETS Thousands/uL 277   NEUTROS PCT % 68   LYMPHS PCT % 16   MONOS PCT % 14*   EOS PCT % 1     Results from last 7 days   Lab Units 11/05/22  0754   SODIUM mmol/L 137   POTASSIUM mmol/L 3 7   CHLORIDE mmol/L 100   CO2 mmol/L 28   BUN mg/dL 36*   CREATININE mg/dL 1 75*   ANION GAP mmol/L 9   CALCIUM mg/dL 8  9   ALBUMIN g/dL 3 2*   TOTAL BILIRUBIN mg/dL 1 92*   ALK PHOS U/L 107*   ALT U/L 50   AST U/L 63*   GLUCOSE RANDOM mg/dL 118     Results from last 7 days   Lab Units 11/05/22  0754   INR  4 06*             Results from last 7 days   Lab Units 11/04/22  1535   LACTIC ACID mmol/L 1 7   PROCALCITONIN ng/ml 0 07       Lines/Drains:  Invasive Devices  Report    Peripheral Intravenous Line  Duration           Peripheral IV 11/04/22 Right Antecubital <1 day                      Imaging: Reviewed radiology reports from this admission including: abdominal/pelvic CT and CT head    Recent Cultures (last 7 days):   Results from last 7 days   Lab Units 11/04/22  1535   BLOOD CULTURE  Received in Microbiology Lab  Culture in Progress  Received in Microbiology Lab  Culture in Progress  Last 24 Hours Medication List:   Current Facility-Administered Medications   Medication Dose Route Frequency Provider Last Rate   • acetaminophen  650 mg Oral Q6H [facility] Luwana Helling, DO     • cefTRIAXone  2,000 mg Intravenous Q24H Luwana Helling, DO 2,000 mg (11/04/22 2220)   • colchicine  0 6 mg Oral Daily PRN Emanuel Valdez DO     • furosemide  40 mg Intravenous Once Mariano Yanes MD     • HYDROmorphone  0 2 mg Intravenous Q4H PRN Luwana Helling, DO     • metroNIDAZOLE  500 mg Intravenous Q8H Luwana Helling,  mg (11/05/22 1058)   • pantoprazole  40 mg Oral Early Morning Luwana Helling, DO     • sodium chloride (PF)  10 mL Intracatheter Daily Luwana Helling, DO     • sotalol  80 mg Oral Daily Luwana Helling, DO          Today, Patient Was Seen By: Edmond Barbosa    **Please Note: This note may have been constructed using a voice recognition system  **

## 2022-11-05 NOTE — ASSESSMENT & PLAN NOTE
· Patient has a history of acute cholecystitis s/p percutaneous cholecystostomy tube  · Now presenting with LFT elevation and right upper quadrant pain  · Patient appears to be in significant pain with perhaps some confusion  · Ammonia normal]  · CT abdomen/pelvis: No acute findings in the chest abdomen or pelvis Transhepatic percutaneous cholecystostomy tube remains in place  Persistent gallbladder inflammatory changes  · UA negative  · Procalcitonin negative  · Troponin 10, 10   · INR 3 49    Plan:  · Surgery consulted, suggested IR evaluation  IR consult planning for tube check tomorrow  · GI was consulted regarding the possibility of acute cholangitis  No plan for ERCP or MRCP at this time  · Continue IV antibiotics with ceftriaxone and flagyl  Patient had had symptomatic improvement    · Continue to monitor CMP

## 2022-11-05 NOTE — TELEMEDICINE
INTERPROFESSIONAL (ELECTRONIC OR PHONE) [address] 81 y o  male MRN: [id number]  Unit/Bed#: S -01 Encounter: [id number]    IR has been consulted regarding the care of Vincent Greco  We were consulted by SLIM concerning this pt with right upper quadrant pain    IP Consult to IR  Consult performed by: Giovanni Scheuermann, MD  Consult ordered by: Jose Roberto Dnoaldson DO        11/05/22      Assessment/Recommendation:   47U with cholecystitis, poor surgical candidate    Initially placed September 1st, subsequently exchanged twice in October    Elevated liver functions imply hepatic dysfunction but imaging does not reveal further gallbladder distention or inflammation    Therefore I believe the tube is working    There may be tract inflammation seen in the abdominal wall although this can not be soft by exchanging the tube, in fact manipulating the tube may make it worse due to transient leakage    Based on my review of the imaging this abdominal wall fluid actually appears decreased from October 18th, therefore I favor no acute manipulation    Low output from cholecystostomy tube may not clinically indicated problem, if the cystic duct is obstructed and no bile enters the gallbladder than no bile exit the tube    Recommendations  - reduced flush volume 5 cc per day for the tube  - trend output  - continue antibiotics    Nothing to indicate tube malfunction on CT    It is reasonable to perform a drain check when logistically feasible if the patient remains in patient, I will place order    Total time spent in review of data, discussion with requesting provider and rendering advice was 12 minutes     Thank you for allowing Interventional Radiology to participate in the care of Vincent Greco  Please don't hesitate to call or TigerText us with any questions       Giovanni Scheuermann      Past Medical History:  Past Medical History:   Diagnosis Date   • Anemia    • Arthritis    • Atrial fibrillation ([place] [address] )    • Basal cell carcinoma 03/22/2022    Tip of Nose   • CHF (congestive heart failure) (HCC)    • Diabetes mellitus ([place] [address] )     Niddm   • DVT (deep vein thrombosis) in pregnancy 1966    not in pregnancy   • DVT (deep venous thrombosis) ([place] [address] ) 1966   • Dyslipidemia    • GERD (gastroesophageal reflux disease)    • Hyperlipidemia    • Hypertension    • Hypomagnesemia 10/19/2022   • Irregular heart beat     Afib   • Morbid obesity due to excess calories ([place] [address] )     Resolved 9/2/2014    • Pulmonary embolism (HCC)    • Sepsis ([place] [address] )    • Squamous cell skin cancer 07/30/2020    Left posterior scalp   • Visual impairment        Past Surgical History:  Past Surgical History:   Procedure Laterality Date   • AORTIC VALVE REPLACEMENT     • CARDIAC DEFIBRILLATOR PLACEMENT  04/2014   • CARDIAC SURGERY  02/2014    AVR   • COLONOSCOPY     • INSERT / REPLACE / REMOVE PACEMAKER     • IR CHOLECYSTOSTOMY TUBE CHECK/CHANGE/REPOSITION/REINSERTION/UPSIZE  10/13/2022   • IR CHOLECYSTOSTOMY TUBE CHECK/CHANGE/REPOSITION/REINSERTION/UPSIZE  10/19/2022   • IR CHOLECYSTOSTOMY TUBE CHECK/CHANGE/REPOSITION/REINSERTION/UPSIZE  10/27/2022   • IR CHOLECYSTOSTOMY TUBE PLACEMENT  9/1/2022   • JOINT REPLACEMENT Left     LTKR   • MOHS SURGERY  07/30/2020    Left posterior scalp, Dr Olu Clay   • MOHS SURGERY  04/18/2022    BCC Tip of Nose- Dr Olu Clay   • NH LIGATE/STRIP LONG [address] SEP-FEM JUNC Right 8/17/2018    Procedure: LEG PERFORATED INJECTION SCLEROTHERAPY;  Surgeon: Sukhwinder Cesar MD;  Location: [facility];  Service: Vascular   • REPLACEMENT TOTAL KNEE Right    • TOTAL KNEE ARTHROPLASTY Left    • VASCULAR SURGERY     • VENA CAVA FILTER PLACEMENT      Interruption inferior vena cava, Gardners filter, placement   • WISDOM TOOTH EXTRACTION         Social History:  Social History     Substance and Sexual Activity   Alcohol Use Not Currently    Comment: no alcohol in 28 yrs     Social History     Substance and Sexual Activity Drug Use Never     Social History     Tobacco Use   Smoking Status Never Smoker   Smokeless Tobacco Never Used       Family History:  Family History   Problem Relation Age of Onset   • Arthritis Mother    • Stroke Mother    • Arthritis Father    • Cancer Father    • Arthritis Daughter        Allergies:   Allergies   Allergen Reactions   • Tramadol Other (See Comments)     intolerance       Medications:  Medications Prior to Admission   Medication   • Acetaminophen (TYLENOL EXTRA STRENGTH PO)   • atorvastatin (LIPITOR) 40 mg tablet   • B Complex-C (SUPER B COMPLEX PO)   • HYDROcodone-acetaminophen (NORCO) 5-325 mg per tablet   • Iron Combinations (NIFEREX) TABS   • metFORMIN (GLUCOPHAGE) 1000 MG tablet   • Multiple Vitamin (MULTIVITAMINS PO)   • mupirocin (BACTROBAN) 2 % ointment   • omeprazole (PriLOSEC) 20 mg delayed release capsule   • potassium chloride (K-DUR,KLOR-CON) 20 mEq tablet   • sodium chloride, PF, 0 9 %   • sotalol (BETAPACE) 80 mg tablet   • torsemide (DEMADEX) 20 mg tablet   • warfarin (COUMADIN) 2 mg tablet   • acetaminophen (TYLENOL) 325 mg tablet   • albuterol (PROVENTIL HFA,VENTOLIN HFA) 90 mcg/act inhaler   • colchicine (COLCRYS) 0 6 mg tablet   • fluticasone (FLONASE) 50 mcg/act nasal spray   • gabapentin (NEURONTIN) 100 mg capsule   • polyethylene glycol (GLYCOLAX) 17 GM/SCOOP powder     Current Facility-Administered Medications   Medication Dose Route Frequency   • acetaminophen (TYLENOL) tablet 650 mg  650 mg Oral Q6H [facility] & assisted   • cefTRIAXone (ROCEPHIN) IVPB (premix in dextrose) 2,000 mg 50 mL  2,000 mg Intravenous Q24H   • colchicine (COLCRYS) tablet 0 6 mg  0 6 mg Oral Daily PRN   • HYDROmorphone HCl (DILAUDID) injection 0 2 mg  0 2 mg Intravenous Q4H PRN   • influenza vaccine, high-dose (FLUZONE HIGH-DOSE) IM injection RAMON 0 7 mL  0 7 mL Intramuscular Once   • lidocaine (LIDODERM) 5 % patch 1 patch  1 patch Topical Once   • metroNIDAZOLE (FLAGYL) IVPB (premix) 500 mg 100 mL  500 mg Intravenous Q8H   • pantoprazole (PROTONIX) EC tablet 40 mg  40 mg Oral Early Morning   • sodium chloride (PF) 0 9 % injection 10 mL  10 mL Intracatheter Daily   • sotalol (BETAPACE) tablet 80 mg  80 mg Oral Daily       Vitals:  /56 (BP Location: Right arm)   Pulse 82   Temp 99 6 °F (37 6 °C) (Oral)   Resp 18   Ht 6' 1" (1 854 m)   Wt 133 kg (293 lb 6 9 oz)   SpO2 94%   BMI 38 71 kg/m²   Body mass index is 38 71 kg/m²    Weight (last 2 days)     Date/Time Weight    11/04/22 1447 133 (293 43)          I/Os:    Intake/Output Summary (Last 24 hours) at 11/5/2022 0857  Last data filed at 11/5/2022 0839  Gross per 24 hour   Intake 110 ml   Output --   Net 110 ml       Lab Results and Cultures:   CBC with diff:   Lab Results   Component Value Date    WBC 9 25 11/05/2022    HGB 8 3 (L) 11/05/2022    HCT 26 2 (L) 11/05/2022    MCV 86 11/05/2022     11/05/2022    MCH 27 2 11/05/2022    MCHC 31 7 11/05/2022    RDW 18 6 (H) 11/05/2022    MPV 9 6 11/05/2022    NRBC 0 11/05/2022      BMP/CMP:  Lab Results   Component Value Date     10/10/2015    K 3 7 11/05/2022    K 4 0 05/24/2022     11/05/2022     05/24/2022    CO2 28 11/05/2022    CO2 27 05/24/2022    ANIONGAP 8 10/10/2015    BUN 36 (H) 11/05/2022    BUN 32 (H) 05/24/2022    CREATININE 1 75 (H) 11/05/2022    CREATININE 1 32 (H) 10/10/2015    GLUCOSE 121 10/10/2015    CALCIUM 8 9 11/05/2022    CALCIUM 9 5 05/24/2022    AST 63 (H) 11/05/2022    AST 23 10/10/2015    ALT 50 11/05/2022    ALT 30 10/10/2015    ALKPHOS 107 (H) 11/05/2022    ALKPHOS 68 10/10/2015    PROT 7 5 10/10/2015    BILITOT 2 23 (H) 10/10/2015    EGFR 34 11/05/2022   ,     Coags:   Lab Results   Component Value Date    PTT 52 (H) 11/04/2022    PTT 37 (H) 04/04/2014    INR 4 06 (H) 11/05/2022    INR 3 25 (H) 04/11/2015   ,   Results from last 7 days   Lab Units 11/05/22  0754 11/04/22  1535   PTT seconds  --  52*   INR  4 06* 3 49*        HgbA1c:   Lab Results   Component Value Date    HGBA1C 6 3 (H) 03/01/2022    HGBA1C 6 2 (H) 12/09/2021    HGBA1C 6 3 (H) 09/13/2021       Blood Culture:   Lab Results   Component Value Date    BLOODCX Received in Microbiology Lab  Culture in Progress  11/04/2022    BLOODCX Received in Microbiology Lab  Culture in Progress   11/04/2022   ,   Urinalysis:   Lab Results   Component Value Date    COLORU Yellow 11/04/2022    COLORU Yellow 07/06/2017    CLARITYU Clear 11/04/2022    CLARITYU Transparent 07/06/2017    SPECGRAV 1 030 11/04/2022    SPECGRAV 1 025 07/06/2017    PHUR 6 0 11/04/2022    PHUR 6 0 07/06/2017    LEUKOCYTESUR Negative 11/04/2022    LEUKOCYTESUR - 07/06/2017    NITRITE Negative 11/04/2022    NITRITE - 07/06/2017    PROTEINUA - 07/06/2017    GLUCOSEU Negative 11/04/2022    GLUCOSEU Negative 02/25/2014    KETONESU Negative 11/04/2022    KETONESU - 07/06/2017    BILIRUBINUR Negative 11/04/2022    BILIRUBINUR NEGATIVE 06/23/2017    BLOODU Negative 11/04/2022    BLOODU - 07/06/2017   ,   Urine Culture:   Lab Results   Component Value Date    URINECX No Growth <1000 cfu/mL 07/09/2019   ,   Wound Culure:    Lab Results   Component Value Date    WOUNDCULT (A) 11/26/2018     1+ Growth of Methicillin Resistant Staphylococcus aureus    WOUNDCULT 2+ Growth of  11/26/2018       Imaging Studies: I have personally reviewed pertinent films in PACS

## 2022-11-05 NOTE — ASSESSMENT & PLAN NOTE
Wt Readings from Last 3 Encounters:   11/04/22 133 kg (293 lb 6 9 oz)   10/25/22 (!) 137 kg (302 lb)   10/24/22 (!) 138 kg (305 lb 3 2 oz)     · Resumed home Lasix    · Reassess volume status daily with daily weight and I/O

## 2022-11-05 NOTE — ASSESSMENT & PLAN NOTE
Lab Results   Component Value Date    EGFR 37 11/04/2022    EGFR 27 10/24/2022    EGFR 22 10/23/2022    CREATININE 1 62 (H) 11/04/2022    CREATININE 2 08 (H) 10/24/2022    CREATININE 2 51 (H) 10/23/2022   · Check AM BMP

## 2022-11-05 NOTE — ASSESSMENT & PLAN NOTE
· Consider MRI if patient's pain better controlled or increased concern for bone infection  · He did not complain of back pain on 11/05/2022

## 2022-11-05 NOTE — DISCHARGE INSTR - AVS FIRST PAGE
Any questions or problems with your tube, please contact Interventional Radiology  [phone]      Dear Vu Stoner,     It was our pleasure to care for you here at [facility]  It is our hope that we were always able to exceed the expected standards for your care during your stay  You were hospitalized due to cholecystitis  You were cared for on the 3rd floor by Philomena Moreau MD under the service of Albina Guadalupe MD with the [facility] who covers for your primary care physician (PCP), Jabier Buitrago DO, while you were hospitalized  If you have any questions or concerns related to this hospitalization, you may contact us at [phone]  For follow up as well as any medication refills, we recommend that you follow up with your primary care physician  A registered nurse will reach out to you by phone within a few days after your discharge to answer any additional questions that you may have after going home  However, at this time we provide for you here, the most important instructions / recommendations at discharge:     Notable Medication Adjustments -   Start Allopurinol 100 mg once a day  Your water pill is held  Please check with your primary care doctor regarding restarting the medication  Testing Required after Discharge -   CMP in 1 week  Important follow up information -   IR , GI for surgery  Other Instructions -   Follow tube care instructions  Please review this entire after visit summary as additional general instructions including medication list, appointments, activity, diet, any pertinent wound care, and other additional recommendations from your care team that may be provided for you  Sincerely,     Philomena Moreau MD   Care after your procedure:    Resume your normal diet  Small sips of flat soda will help with nausea      1  The properly functioning catheter should be forward flushed twice (2x) daily with 10ml of normal saline using clean technique  You will be given a prescription for flushes  To flush the tube, clean both connections with alcohol swab  Twist off the drainage bag/ bulb  tubing and twist the saline syringe into the drainage tube and flush  Remove the syringe and twist the drainage bag / bulb tubing tubing back on     2  The drainage bag/bulb may be emptied as necessary  Keep a record of the amount of fluid you drain from your tube  This should be done with clean technique as well  3  A fresh dressing should be applied daily over the tube insertion site  4  As the tube is secured to the skin with only a suture,try not to pull on your tube  Tub baths are not permitted  Showers are permitted if the patient's skin entry site is prevented from getting wet  Similarly, washcloth "baths" are acceptable  Contact Interventional Radiology at [phone] Dennys PATIENTS: Contact Interventional Radiology at [phone]) Pebbles Caicedo PATIENTS: Contact Interventional Radiology at [phone]) if:    1  Leakage or large amounts of liquid around the catheter  2  Fever of 101 degrees lasting several hours without other obvious cause (such as sore throat, flu, etc)  3  Persistent nausea or vomiting  4  Diminished drainage, which may be associated with pressure or pain  Or when the     drainage from your tube is less than 10mls for 48 hours  5  Catheter pulled back or falls out  The following pharmacies carry the flush syringes         [facility]                     [facility]  [address]                         [address] [facility]  Phone [phone]            Phone [phone] [address]             [facility]                                [phone]  [address] [place] [facility]                      [address]  Phone [phone]            Phone [phone]                      Jeremy Quiles                                                                                                          [phone]  [facility]  [facility] [address]  [address] [facility]  Phone [phone]   ___________________________________________________________________________________________________________    Radiology should contact you for an appointment in 2 weeks  Consider calling them at [phone]  The plan is to remove your gallstones through your gallbladder tube  It is thought that the risk of surgery to remove your gallbladder is too great  Radiology will  you in this regard  Please remind IR team you are  on anticoagulation  You will need to discontinue this whenever a radiology procedure is to be completed    ______________________________________________________________________________________________________________   [facility]

## 2022-11-05 NOTE — PROGRESS NOTES
General Surgery  Progress Note   Mona Duggan 81 y o  male MRN: [id number]  Unit/Bed#: S -01 Encounter: [id number]    Assessment:  60-year-old male with 1 day of altered mental status  History of chronic cholecystitis status post percutaneous cholecystostomy tube placement initially on , exchanged on 10/19 by IR during patient admission from 10/18-10/24, exchanged again on 10/27 by IR  Diastolic congestive heart failure  Stage III CKD  History of AFib, on warfarin-currently supratherapeutic INR    Vital signs stable, afebrile to 101 F last night at 10:00 p m  Abdomen remains soft, nontender  Perc jeny drain with purulent output  Patient reports some discomfort at tube insertion site  Plan:  • NPO  • Follow-up IR/GI consult  • Follow-up CMP, INR  • IV Rocephin/Flagyl per slim  • Monitor perc jeny tube output  • Care per primary team    Subjective/Objective     Subjective: Patient seen and examined at bedside, in no acute distress  No acute events overnight  Denies pain  Further events as above    Objective:     Vitals:Blood pressure 110/56, pulse 82, temperature 99 6 °F (37 6 °C), temperature source Oral, resp  rate 18, height 6' 1" (1 854 m), weight 133 kg (293 lb 6 9 oz), SpO2 94 %  ,Body mass index is 38 71 kg/m²  Temp (24hrs), Av 1 °F (37 8 °C), Min:99 6 °F (37 6 °C), Max:101 °F (38 3 °C)  Current: Temperature: 99 6 °F (37 6 °C)      Intake/Output Summary (Last 24 hours) at 2022 9911  Last data filed at 2022 1613  Gross per 24 hour   Intake 100 ml   Output --   Net 100 ml       Invasive Devices  Report    Peripheral Intravenous Line  Duration           Peripheral IV 22 Right Antecubital <1 day                Physical Exam:  General: No acute distress  CV: Well perfused, regular rate and rhythm  Lungs: Normal work of breathing, no increased respiratory effort  Abdomen: Soft, non-tender, non-distended    Right perc jeny tube in place with purulent output  Extremities:  Bilateral lower extremity edema with venous stasis skin changes  Skin: Warm, dry    Lab Results:   BMP/CMP:   Lab Results   Component Value Date    SODIUM 135 11/04/2022    K 4 0 11/04/2022    CL 97 11/04/2022    CO2 28 11/04/2022    BUN 32 (H) 11/04/2022    CREATININE 1 62 (H) 11/04/2022    CALCIUM 9 2 11/04/2022     (H) 11/04/2022    ALT 65 (H) 11/04/2022    ALKPHOS 131 (H) 11/04/2022    EGFR 37 11/04/2022    and CBC:   Lab Results   Component Value Date    WBC 9 25 11/05/2022    HGB 8 3 (L) 11/05/2022    HCT 26 2 (L) 11/05/2022    MCV 86 11/05/2022     11/05/2022    MCH 27 2 11/05/2022    MCHC 31 7 11/05/2022    RDW 18 6 (H) 11/05/2022    MPV 9 6 11/05/2022    NRBC 0 11/05/2022     VTE Prophylaxis: Sequential compression device (Venodyne)     Madelyn Handspiker  11/5/2022

## 2022-11-05 NOTE — ASSESSMENT & PLAN NOTE
Lab Results   Component Value Date    EGFR 34 11/05/2022    EGFR 37 11/04/2022    EGFR 27 10/24/2022    CREATININE 1 75 (H) 11/05/2022    CREATININE 1 62 (H) 11/04/2022    CREATININE 2 08 (H) 10/24/2022   · Following BMP

## 2022-11-05 NOTE — RESPIRATORY THERAPY NOTE
Pt refusing HS CPAP at this time  Told Pt and family if he changes his mind to let us know  RN aware  Ladan Lomax, RRT   11/4/22  10:35 PM

## 2022-11-05 NOTE — ASSESSMENT & PLAN NOTE
· Patient has a history of acute cholecystitis s/p percutaneous cholecystostomy tube  · Now presenting with LFT elevation and right upper quadrant pain  · Patient appears to be in significant pain with perhaps some confusion  · Ammonia normal]  · CT abdomen/pelvis: No acute findings in the chest abdomen or pelvis Transhepatic percutaneous cholecystostomy tube remains in place  Persistent gallbladder inflammatory changes  · UA negative  · Procalcitonin negative  · Troponin 10, 10   · INR 3 49    Plan:  · Surgery consulted, suggested IR evaluation  IR consult, plan to evaluate  · GI was consulted regarding the possibility of acute cholangitis  · Continue IV antibiotics with ceftriaxone and flagyl  Patient had had symptomatic improvement    · Continue to monitor CMP

## 2022-11-05 NOTE — ASSESSMENT & PLAN NOTE
Wt Readings from Last 3 Encounters:   11/04/22 133 kg (293 lb 6 9 oz)   10/25/22 (!) 137 kg (302 lb)   10/24/22 (!) 138 kg (305 lb 3 2 oz)     · Hold home diuretic for now  · Reassess volume status daily with daily weight and I/O

## 2022-11-06 LAB
ALBUMIN SERPL BCP-MCNC: 3.3 G/DL (ref 3.5–5)
ALP SERPL-CCNC: 108 U/L (ref 34–104)
ALT SERPL W P-5'-P-CCNC: 38 U/L (ref 7–52)
ANION GAP SERPL CALCULATED.3IONS-SCNC: 11 MMOL/L (ref 4–13)
AST SERPL W P-5'-P-CCNC: 41 U/L (ref 13–39)
ATRIAL RATE: 115 BPM
BILIRUB DIRECT SERPL-MCNC: 0.54 MG/DL (ref 0–0.2)
BILIRUB SERPL-MCNC: 1.72 MG/DL (ref 0.2–1)
BUN SERPL-MCNC: 41 MG/DL (ref 5–25)
CALCIUM SERPL-MCNC: 8.8 MG/DL (ref 8.4–10.2)
CHLORIDE SERPL-SCNC: 101 MMOL/L (ref 96–108)
CO2 SERPL-SCNC: 25 MMOL/L (ref 21–32)
CREAT SERPL-MCNC: 2 MG/DL (ref 0.6–1.3)
ERYTHROCYTE [DISTWIDTH] IN BLOOD BY AUTOMATED COUNT: 18.3 % (ref 11.6–15.1)
GFR SERPL CREATININE-BSD FRML MDRD: 29 ML/MIN/1.73SQ M
GLUCOSE SERPL-MCNC: 125 MG/DL (ref 65–140)
GLUCOSE SERPL-MCNC: 125 MG/DL (ref 65–140)
HCT VFR BLD AUTO: 28.9 % (ref 36.5–49.3)
HGB BLD-MCNC: 8.8 G/DL (ref 12–17)
INR PPP: 5.48 (ref 0.84–1.19)
MCH RBC QN AUTO: 27.1 PG (ref 26.8–34.3)
MCHC RBC AUTO-ENTMCNC: 30.4 G/DL (ref 31.4–37.4)
MCV RBC AUTO: 89 FL (ref 82–98)
PLATELET # BLD AUTO: 281 THOUSANDS/UL (ref 149–390)
PMV BLD AUTO: 9.7 FL (ref 8.9–12.7)
POTASSIUM SERPL-SCNC: 3.8 MMOL/L (ref 3.5–5.3)
PR INTERVAL: 160 MS
PROT SERPL-MCNC: 7.2 G/DL (ref 6.4–8.4)
PROTHROMBIN TIME: 50.1 SECONDS (ref 11.6–14.5)
QRS AXIS: 138 DEGREES
QRSD INTERVAL: 146 MS
QT INTERVAL: 404 MS
QTC INTERVAL: 558 MS
RBC # BLD AUTO: 3.25 MILLION/UL (ref 3.88–5.62)
SODIUM SERPL-SCNC: 137 MMOL/L (ref 135–147)
T WAVE AXIS: 34 DEGREES
VENTRICULAR RATE: 115 BPM
WBC # BLD AUTO: 10.76 THOUSAND/UL (ref 4.31–10.16)

## 2022-11-06 PROCEDURE — 93010 ELECTROCARDIOGRAM REPORT: CPT | Performed by: INTERNAL MEDICINE

## 2022-11-06 PROCEDURE — 85610 PROTHROMBIN TIME: CPT | Performed by: INTERNAL MEDICINE

## 2022-11-06 PROCEDURE — 82948 REAGENT STRIP/BLOOD GLUCOSE: CPT

## 2022-11-06 PROCEDURE — 85027 COMPLETE CBC AUTOMATED: CPT

## 2022-11-06 PROCEDURE — 80048 BASIC METABOLIC PNL TOTAL CA: CPT

## 2022-11-06 PROCEDURE — 99232 SBSQ HOSP IP/OBS MODERATE 35: CPT | Performed by: HOSPITALIST

## 2022-11-06 PROCEDURE — 80076 HEPATIC FUNCTION PANEL: CPT | Performed by: SURGERY

## 2022-11-06 PROCEDURE — 99232 SBSQ HOSP IP/OBS MODERATE 35: CPT | Performed by: SURGERY

## 2022-11-06 RX ORDER — INSULIN LISPRO 100 [IU]/ML
2-12 INJECTION, SOLUTION INTRAVENOUS; SUBCUTANEOUS
Status: DISCONTINUED | OUTPATIENT
Start: 2022-11-06 | End: 2022-11-14 | Stop reason: HOSPADM

## 2022-11-06 RX ADMIN — CEFTRIAXONE 2000 MG: 2 INJECTION, SOLUTION INTRAVENOUS at 19:57

## 2022-11-06 RX ADMIN — OXYCODONE HYDROCHLORIDE 5 MG: 5 TABLET ORAL at 00:46

## 2022-11-06 RX ADMIN — GABAPENTIN 100 MG: 100 CAPSULE ORAL at 21:18

## 2022-11-06 RX ADMIN — OXYCODONE HYDROCHLORIDE 5 MG: 5 TABLET ORAL at 20:21

## 2022-11-06 RX ADMIN — OXYCODONE HYDROCHLORIDE 5 MG: 5 TABLET ORAL at 14:11

## 2022-11-06 RX ADMIN — ACETAMINOPHEN 650 MG: 325 TABLET, FILM COATED ORAL at 11:24

## 2022-11-06 RX ADMIN — SODIUM CHLORIDE 5 ML: 9 INJECTION INTRAMUSCULAR; INTRAVENOUS; SUBCUTANEOUS at 08:42

## 2022-11-06 RX ADMIN — ACETAMINOPHEN 650 MG: 325 TABLET, FILM COATED ORAL at 17:49

## 2022-11-06 RX ADMIN — METRONIDAZOLE 500 MG: 5 INJECTION, SOLUTION INTRAVENOUS at 20:53

## 2022-11-06 RX ADMIN — PANTOPRAZOLE SODIUM 40 MG: 40 TABLET, DELAYED RELEASE ORAL at 04:03

## 2022-11-06 RX ADMIN — METRONIDAZOLE 500 MG: 5 INJECTION, SOLUTION INTRAVENOUS at 04:02

## 2022-11-06 RX ADMIN — SOTALOL HYDROCHLORIDE 80 MG: 80 TABLET ORAL at 09:01

## 2022-11-06 RX ADMIN — ACETAMINOPHEN 650 MG: 325 TABLET, FILM COATED ORAL at 04:03

## 2022-11-06 RX ADMIN — HYDROMORPHONE HYDROCHLORIDE 0.2 MG: 0.2 INJECTION, SOLUTION INTRAMUSCULAR; INTRAVENOUS; SUBCUTANEOUS at 15:31

## 2022-11-06 RX ADMIN — METRONIDAZOLE 500 MG: 5 INJECTION, SOLUTION INTRAVENOUS at 11:23

## 2022-11-06 NOTE — ASSESSMENT & PLAN NOTE
Lab Results   Component Value Date    EGFR 29 11/06/2022    EGFR 34 11/05/2022    EGFR 37 11/04/2022    CREATININE 2 00 (H) 11/06/2022    CREATININE 1 75 (H) 11/05/2022    CREATININE 1 62 (H) 11/04/2022   · Following BMP

## 2022-11-06 NOTE — ASSESSMENT & PLAN NOTE
Wt Readings from Last 3 Encounters:   11/06/22 133 kg (294 lb 1 5 oz)   10/25/22 (!) 137 kg (302 lb)   10/24/22 (!) 138 kg (305 lb 3 2 oz)     · Resumed home Lasix    · Reassess volume status daily with daily weight and I/O

## 2022-11-06 NOTE — ASSESSMENT & PLAN NOTE
· Hold warfarin, INR still supratherapeutic  · Continue sotalol initiation of breastfeeding/breast milk feeding

## 2022-11-06 NOTE — PROGRESS NOTES
General Surgery  Progress Note   Michael Eugene 81 y o  male MRN: [id number]  Unit/Bed#: S -01 Encounter: [id number]    Assessment:  60-year-old male who presented with acute encephalopathy, now resolved  History of chronic cholecystitis status post percutaneous cholecystostomy tube placement initially on , exchanged on 10/19 by IR during patient admission from 10/18-10/24, exchanged again on 10/27 by IR  Diastolic congestive heart failure  Stage III CKD  History of AFib, on warfarin-currently supratherapeutic INR (Warfarin currently held)    Vital signs stable, afebrile over the last 24 hours  Abdomen remains soft, nontender  Perc jeny drain with purulent output  Patient reports some discomfort at tube insertion site  Mild purulent leakage around drain site    Patient complaining of bilateral lower extremity pain, left upper arm pain and bilateral hand pain with touch  His home gabapentin was resumed last night  Left upper extremity bicep has small area of induration c/f hematoma  Plan:  • Ok to give regular diet, will order diabetic Ensures as well  o NPO after midnight  • Given b/l LE pain and arm pain, would recommend bilateral lower extremity venous duplexes and left upper extremity duplex  o Discussed with primary team  • Continue to hold warfarin - INR 5 48 today  • IV Rocephin/Flagyl per slim  • Monitor perc jeny tube output  o Plan for IR tube check tomorrow vs Tuesday, appreciate IR input  o GI consult: No current role for MR or ERCP  • Care per primary team    Subjective/Objective     Subjective: Patient seen and examined at bedside, in no acute distress  No acute events overnight  Improved mental status but reporting pins and needles in hands and feet  Objective:     Vitals:Blood pressure 110/57, pulse 82, temperature 99 2 °F (37 3 °C), resp  rate 18, height 6' 1" (1 854 m), weight 133 kg (294 lb 1 5 oz), SpO2 98 %  ,Body mass index is 38 8 kg/m²       Temp (24hrs), Av 6 °F (37 6 °C), Min:98 9 °F (37 2 °C), Max:100 3 °F (37 9 °C)  Current: Temperature: 99 2 °F (37 3 °C)      Intake/Output Summary (Last 24 hours) at 11/6/2022 1006  Last data filed at 11/6/2022 0301  Gross per 24 hour   Intake 0 ml   Output 650 ml   Net -650 ml       Invasive Devices  Report    Peripheral Intravenous Line  Duration           Peripheral IV 11/04/22 Right Antecubital 1 day                Physical Exam:  General: No acute distress  CV: Well perfused, regular rate and rhythm  Lungs: Normal work of breathing, no increased respiratory effort  Abdomen: Soft, non-tender, non-distended  Right perc jeny tube in place with purulent output in bag  Some purulent discharge around tube at skin site  Extremities:  Bilateral lower extremity edema with venous stasis skin changes, improved edema   B/l feet warm  Skin: Warm, dry    Lab Results:   BMP/CMP:   Lab Results   Component Value Date    SODIUM 137 11/06/2022    K 3 8 11/06/2022     11/06/2022    CO2 25 11/06/2022    BUN 41 (H) 11/06/2022    CREATININE 2 00 (H) 11/06/2022    CALCIUM 8 8 11/06/2022    AST 41 (H) 11/06/2022    ALT 38 11/06/2022    ALKPHOS 108 (H) 11/06/2022    EGFR 29 11/06/2022    and CBC:   Lab Results   Component Value Date    WBC 10 76 (H) 11/06/2022    HGB 8 8 (L) 11/06/2022    HCT 28 9 (L) 11/06/2022    MCV 89 11/06/2022     11/06/2022    MCH 27 1 11/06/2022    MCHC 30 4 (L) 11/06/2022    RDW 18 3 (H) 11/06/2022    MPV 9 7 11/06/2022     VTE Prophylaxis: Sequential compression device (Venodyne)     Madelyn Handspiker  11/6/2022

## 2022-11-06 NOTE — PROGRESS NOTES
[facility]  Progress Note - Beth Agarwal 1935, 81 y o  male MRN: [id number]  Unit/Bed#: S -01 Encounter: [id number]  Primary Care Provider: Franco Quiroz DO   Date and time admitted to hospital: 11/4/2022  2:38 PM    * Acute on chronic cholecystitis  Assessment & Plan  · Patient has a history of acute cholecystitis s/p percutaneous cholecystostomy tube  · Now presenting with LFT elevation and right upper quadrant pain  · Patient appears to be in significant pain with perhaps some confusion  · Ammonia normal]  · CT abdomen/pelvis: No acute findings in the chest abdomen or pelvis Transhepatic percutaneous cholecystostomy tube remains in place  Persistent gallbladder inflammatory changes  · UA negative  · Procalcitonin negative  · Troponin 10, 10   · INR 3 49    Plan:  · Surgery consulted, suggested IR evaluation  IR consult planning for tube check tomorrow  · GI was consulted regarding the possibility of acute cholangitis  No plan for ERCP or MRCP at this time  · Continue IV antibiotics with ceftriaxone and flagyl  Patient had had symptomatic improvement  · Continue to monitor CMP      Thoracic back pain  Assessment & Plan  · Consider MRI if patient's pain better controlled or increased concern for bone infection  · He did not complain of back pain on 11/05/2022  OBINNA (obstructive sleep apnea)  Assessment & Plan  · Continue cpap    Diastolic congestive heart failure [facility])  Assessment & Plan  Wt Readings from Last 3 Encounters:   11/06/22 133 kg (294 lb 1 5 oz)   10/25/22 (!) 137 kg (302 lb)   10/24/22 (!) 138 kg (305 lb 3 2 oz)     · Resumed home Lasix    · Reassess volume status daily with daily weight and I/O         Stage 3a chronic kidney disease with elevated creatinine  Assessment & Plan  Lab Results   Component Value Date    EGFR 29 11/06/2022    EGFR 34 11/05/2022    EGFR 37 11/04/2022    CREATININE 2 00 (H) 11/06/2022    CREATININE 1 75 (H) 11/05/2022 CREATININE 1 62 (H) 2022   · Following BMP    DVT (deep venous thrombosis) (HCC)  Assessment & Plan  · S/p IVC filter   · Warfarin held as above  · Patient ordered for Doppler of lower extremities for lower extremity pain    Paroxysmal atrial fibrillation (HCC)  Assessment & Plan  · Hold warfarin, INR still supratherapeutic  · Continue sotalol    VTE Pharmacologic Prophylaxis: VTE Score: 3 Moderate Risk (Score 3-4) - Pharmacological DVT Prophylaxis Contraindicated  Sequential Compression Devices Ordered  Patient Centered Rounds: I performed bedside rounds with nursing staff today  Discussions with Specialists or Other Care Team Provider:  General surgery    Education and Discussions with Family / Patient: Updated  (wife) at bedside  Current Length of Stay: 2 day(s)  Current Patient Status: Inpatient   Discharge Plan: Pending clinical course    Code Status: Level 1 - Full Code    Subjective:   Having some overall arthralgias  No obvious increased warmth or swelling appreciated at these joints  Objective:     Vitals:   Temp (24hrs), Av 5 °F (37 5 °C), Min:99 °F (37 2 °C), Max:100 3 °F (37 9 °C)    Temp:  [99 °F (37 2 °C)-100 3 °F (37 9 °C)] 99 °F (37 2 °C)  HR:  [70-82] 70  Resp:  [18] 18  BP: (108-116)/(54-67) 116/54  SpO2:  [91 %-98 %] 94 %  Body mass index is 38 8 kg/m²  Input and Output Summary (last 24 hours): Intake/Output Summary (Last 24 hours) at 2022 1705  Last data filed at 2022 1100  Gross per 24 hour   Intake 260 ml   Output 450 ml   Net -190 ml       Physical Exam:   Physical Exam  Constitutional:       Appearance: He is ill-appearing  HENT:      Head: Normocephalic and atraumatic  Cardiovascular:      Rate and Rhythm: Normal rate and regular rhythm  Heart sounds: Murmur heard  Pulmonary:      Effort: Pulmonary effort is normal       Breath sounds: Normal breath sounds     Abdominal:      General: Bowel sounds are normal       Palpations: Abdomen is soft  Tenderness: There is no abdominal tenderness  Comments: Percutaneous cholecystostomy tube intact   Musculoskeletal:      Right lower leg: No edema  Left lower leg: No edema  Skin:     General: Skin is warm and dry  Neurological:      General: No focal deficit present  Mental Status: He is alert and oriented to person, place, and time  Psychiatric:         Mood and Affect: Mood normal          Behavior: Behavior normal           Additional Data:     Labs:  Results from last 7 days   Lab Units 11/06/22  0443 11/05/22  0754   WBC Thousand/uL 10 76* 9 25   HEMOGLOBIN g/dL 8 8* 8 3*   HEMATOCRIT % 28 9* 26 2*   PLATELETS Thousands/uL 281 277   NEUTROS PCT %  --  68   LYMPHS PCT %  --  16   MONOS PCT %  --  14*   EOS PCT %  --  1     Results from last 7 days   Lab Units 11/06/22  0850 11/06/22  0443   SODIUM mmol/L  --  137   POTASSIUM mmol/L  --  3 8   CHLORIDE mmol/L  --  101   CO2 mmol/L  --  25   BUN mg/dL  --  41*   CREATININE mg/dL  --  2 00*   ANION GAP mmol/L  --  11   CALCIUM mg/dL  --  8 8   ALBUMIN g/dL 3 3*  --    TOTAL BILIRUBIN mg/dL 1 72*  --    ALK PHOS U/L 108*  --    ALT U/L 38  --    AST U/L 41*  --    GLUCOSE RANDOM mg/dL  --  125     Results from last 7 days   Lab Units 11/06/22  0850   INR  5 48*             Results from last 7 days   Lab Units 11/04/22  1535   LACTIC ACID mmol/L 1 7   PROCALCITONIN ng/ml 0 07       Lines/Drains:  Invasive Devices  Report    Peripheral Intravenous Line  Duration           Peripheral IV 11/04/22 Right Antecubital 2 days                  Telemetry:  Telemetry Orders (From admission, onward)             48 Hour Telemetry Monitoring  Continuous x 48 hours        References:    Telemetry Guidelines   Question:  Reason for 48 Hour Telemetry  Answer:  Arrhythmias Requiring Medical Therapy (eg  SVT, Vtach/fib, Bradycardia, Uncontrolled A-fib)                          Imaging: No pertinent imaging reviewed      Recent Cultures (last 7 days):   Results from last 7 days   Lab Units 11/04/22  1535   BLOOD CULTURE  No Growth at 24 hrs  No Growth at 24 hrs  Last 24 Hours Medication List:   Current Facility-Administered Medications   Medication Dose Route Frequency Provider Last Rate   • acetaminophen  650 mg Oral Q6H [address] Edmonia Verónica, DO     • cefTRIAXone  2,000 mg Intravenous Q24H Edmonia Verónica, DO 2,000 mg (11/05/22 1935)   • gabapentin  100 mg Oral HS Chung Ortiz DO     • HYDROmorphone  0 2 mg Intravenous Q4H PRN Edmonia Verónica, DO     • metroNIDAZOLE  500 mg Intravenous Q8H Edmonia Verónica,  mg (11/06/22 1123)   • oxyCODONE  2 5 mg Oral Q6H PRN Eldonna Gastlola, DO     • oxyCODONE  5 mg Oral Q6H PRN Eldonna Gaster, DO     • pantoprazole  40 mg Oral Early Morning Lamberto Sanches DO     • sodium chloride (PF)  5 mL Intracatheter Daily Josesito Charles MD     • sotalol  80 mg Oral Daily Lamberto Sanches DO          Today, Patient Was Seen By: Lamberto Sanches    **Please Note: This note may have been constructed using a voice recognition system  **

## 2022-11-06 NOTE — ASSESSMENT & PLAN NOTE
· S/p IVC filter   · Warfarin held as above    · Patient ordered for Doppler of lower extremities for lower extremity pain

## 2022-11-06 NOTE — PLAN OF CARE
Problem: Potential for Falls  Goal: Patient will remain free of falls  Description: INTERVENTIONS:  - Educate patient/family on patient safety including physical limitations  - Instruct patient to call for assistance with activity   - Consult OT/PT to assist with strengthening/mobility   - Keep Call bell within reach  - Keep bed low and locked with side rails adjusted as appropriate  - Keep care items and personal belongings within reach  - Initiate and maintain comfort rounds  - Make Fall Risk Sign visible to staff  - Offer Toileting every 2 Hours, in advance of need  - Initiate/Maintain bed alarm  - Obtain necessary fall risk management equipment: bed alarm  - Apply yellow socks and bracelet for high fall risk patients  - Consider moving patient to room near nurses station  Outcome: Progressing     Problem: Prexisting or High Potential for Compromised Skin Integrity  Goal: Skin integrity is maintained or improved  Description: INTERVENTIONS:  - Identify patients at risk for skin breakdown  - Assess and monitor skin integrity  - Assess and monitor nutrition and hydration status  - Monitor labs   - Assess for incontinence   - Turn and reposition patient  - Assist with mobility/ambulation  - Relieve pressure over bony prominences  - Avoid friction and shearing  - Provide appropriate hygiene as needed including keeping skin clean and dry  - Evaluate need for skin moisturizer/barrier cream  - Collaborate with interdisciplinary team   - Patient/family teaching  - Consider wound care consult   Outcome: Progressing     Problem: MOBILITY - ADULT  Goal: Maintain or return to baseline ADL function  Description: INTERVENTIONS:  -  Assess patient's ability to carry out ADLs; assess patient's baseline for ADL function and identify physical deficits which impact ability to perform ADLs (bathing, care of mouth/teeth, toileting, grooming, dressing, etc )  - Assess/evaluate cause of self-care deficits   - Assess range of motion  - Assess patient's mobility; develop plan if impaired  - Assess patient's need for assistive devices and provide as appropriate  - Encourage maximum independence but intervene and supervise when necessary  - Involve family in performance of ADLs  - Assess for home care needs following discharge   - Consider OT consult to assist with ADL evaluation and planning for discharge  - Provide patient education as appropriate  Outcome: Progressing  Goal: Maintains/Returns to pre admission functional level  Description: INTERVENTIONS:  - Perform BMAT or MOVE assessment daily    - Set and communicate daily mobility goal to care team and patient/family/caregiver  - Collaborate with rehabilitation services on mobility goals if consulted  - Perform Range of Motion 2 times a day  - Reposition patient every 2 hours    - Dangle patient 2 times a day  - Stand patient 2 times a day  - Ambulate patient 2 times a day  - Out of bed to chair 2 times a day   - Out of bed for meals 2 times a day  - Out of bed for toileting  - Record patient progress and toleration of activity level   Outcome: Progressing

## 2022-11-07 ENCOUNTER — APPOINTMENT (INPATIENT)
Dept: VASCULAR ULTRASOUND | Facility: HOSPITAL | Age: 87
DRG: 444 | End: 2022-11-07
Payer: MEDICARE

## 2022-11-07 ENCOUNTER — TELEPHONE (OUTPATIENT)
Dept: NEPHROLOGY | Facility: CLINIC | Age: 87
End: 2022-11-07

## 2022-11-07 ENCOUNTER — APPOINTMENT (INPATIENT)
Dept: RADIOLOGY | Facility: HOSPITAL | Age: 87
DRG: 444 | End: 2022-11-07
Attending: RADIOLOGY
Payer: MEDICARE

## 2022-11-07 ENCOUNTER — HOME CARE VISIT (OUTPATIENT)
Dept: HOME HEALTH SERVICES | Facility: HOME HEALTHCARE | Age: 87
End: 2022-11-07

## 2022-11-07 ENCOUNTER — TELEPHONE (OUTPATIENT)
Dept: RADIOLOGY | Facility: HOSPITAL | Age: 87
End: 2022-11-07

## 2022-11-07 PROBLEM — I80.9 THROMBOPHLEBITIS: Status: ACTIVE | Noted: 2018-03-22

## 2022-11-07 PROBLEM — M79.606 LEG PAIN: Status: ACTIVE | Noted: 2022-11-07

## 2022-11-07 LAB
ALBUMIN SERPL BCP-MCNC: 2.9 G/DL (ref 3.5–5)
ALP SERPL-CCNC: 97 U/L (ref 34–104)
ALT SERPL W P-5'-P-CCNC: 28 U/L (ref 7–52)
ANION GAP SERPL CALCULATED.3IONS-SCNC: 11 MMOL/L (ref 4–13)
AST SERPL W P-5'-P-CCNC: 36 U/L (ref 13–39)
BILIRUB SERPL-MCNC: 1.09 MG/DL (ref 0.2–1)
BUN SERPL-MCNC: 47 MG/DL (ref 5–25)
CALCIUM ALBUM COR SERPL-MCNC: 9.1 MG/DL (ref 8.3–10.1)
CALCIUM SERPL-MCNC: 8.2 MG/DL (ref 8.4–10.2)
CHLORIDE SERPL-SCNC: 100 MMOL/L (ref 96–108)
CO2 SERPL-SCNC: 26 MMOL/L (ref 21–32)
CREAT SERPL-MCNC: 2.24 MG/DL (ref 0.6–1.3)
ERYTHROCYTE [DISTWIDTH] IN BLOOD BY AUTOMATED COUNT: 18.5 % (ref 11.6–15.1)
GFR SERPL CREATININE-BSD FRML MDRD: 25 ML/MIN/1.73SQ M
GLUCOSE SERPL-MCNC: 109 MG/DL (ref 65–140)
GLUCOSE SERPL-MCNC: 112 MG/DL (ref 65–140)
GLUCOSE SERPL-MCNC: 114 MG/DL (ref 65–140)
GLUCOSE SERPL-MCNC: 117 MG/DL (ref 65–140)
GLUCOSE SERPL-MCNC: 130 MG/DL (ref 65–140)
HCT VFR BLD AUTO: 27.3 % (ref 36.5–49.3)
HGB BLD-MCNC: 8.3 G/DL (ref 12–17)
INR PPP: 5.9 (ref 0.84–1.19)
MCH RBC QN AUTO: 26.9 PG (ref 26.8–34.3)
MCHC RBC AUTO-ENTMCNC: 30.4 G/DL (ref 31.4–37.4)
MCV RBC AUTO: 89 FL (ref 82–98)
PLATELET # BLD AUTO: 304 THOUSANDS/UL (ref 149–390)
PMV BLD AUTO: 10.4 FL (ref 8.9–12.7)
POTASSIUM SERPL-SCNC: 3.7 MMOL/L (ref 3.5–5.3)
PROT SERPL-MCNC: 6.4 G/DL (ref 6.4–8.4)
PROTHROMBIN TIME: 53 SECONDS (ref 11.6–14.5)
RBC # BLD AUTO: 3.08 MILLION/UL (ref 3.88–5.62)
SODIUM SERPL-SCNC: 137 MMOL/L (ref 135–147)
WBC # BLD AUTO: 9.05 THOUSAND/UL (ref 4.31–10.16)

## 2022-11-07 PROCEDURE — 85027 COMPLETE CBC AUTOMATED: CPT | Performed by: INTERNAL MEDICINE

## 2022-11-07 PROCEDURE — 85610 PROTHROMBIN TIME: CPT | Performed by: INTERNAL MEDICINE

## 2022-11-07 PROCEDURE — 99232 SBSQ HOSP IP/OBS MODERATE 35: CPT | Performed by: HOSPITALIST

## 2022-11-07 PROCEDURE — 0FP430Z REMOVAL OF DRAINAGE DEVICE FROM GALLBLADDER, PERCUTANEOUS APPROACH: ICD-10-PCS | Performed by: RADIOLOGY

## 2022-11-07 PROCEDURE — 93971 EXTREMITY STUDY: CPT

## 2022-11-07 PROCEDURE — 93970 EXTREMITY STUDY: CPT

## 2022-11-07 PROCEDURE — 97530 THERAPEUTIC ACTIVITIES: CPT

## 2022-11-07 PROCEDURE — 93970 EXTREMITY STUDY: CPT | Performed by: SURGERY

## 2022-11-07 PROCEDURE — 97163 PT EVAL HIGH COMPLEX 45 MIN: CPT

## 2022-11-07 PROCEDURE — 97167 OT EVAL HIGH COMPLEX 60 MIN: CPT

## 2022-11-07 PROCEDURE — 99232 SBSQ HOSP IP/OBS MODERATE 35: CPT | Performed by: SURGERY

## 2022-11-07 PROCEDURE — 82948 REAGENT STRIP/BLOOD GLUCOSE: CPT

## 2022-11-07 PROCEDURE — 80053 COMPREHEN METABOLIC PANEL: CPT | Performed by: INTERNAL MEDICINE

## 2022-11-07 PROCEDURE — 47531 INJECTION FOR CHOLANGIOGRAM: CPT

## 2022-11-07 PROCEDURE — 93971 EXTREMITY STUDY: CPT | Performed by: SURGERY

## 2022-11-07 RX ORDER — GABAPENTIN 100 MG/1
100 CAPSULE ORAL 3 TIMES DAILY
Status: DISCONTINUED | OUTPATIENT
Start: 2022-11-07 | End: 2022-11-14 | Stop reason: HOSPADM

## 2022-11-07 RX ORDER — POTASSIUM CHLORIDE 20 MEQ/1
40 TABLET, EXTENDED RELEASE ORAL ONCE
Status: DISCONTINUED | OUTPATIENT
Start: 2022-11-07 | End: 2022-11-08

## 2022-11-07 RX ORDER — PHYTONADIONE 10 MG/ML
5 INJECTION, EMULSION INTRAMUSCULAR; INTRAVENOUS; SUBCUTANEOUS ONCE
Status: COMPLETED | OUTPATIENT
Start: 2022-11-07 | End: 2022-11-07

## 2022-11-07 RX ADMIN — ACETAMINOPHEN 650 MG: 325 TABLET, FILM COATED ORAL at 17:54

## 2022-11-07 RX ADMIN — IOHEXOL 15 ML: 350 INJECTION, SOLUTION INTRAVENOUS at 17:16

## 2022-11-07 RX ADMIN — PHYTONADIONE 5 MG: 10 INJECTION, EMULSION INTRAMUSCULAR; INTRAVENOUS; SUBCUTANEOUS at 06:33

## 2022-11-07 RX ADMIN — OXYCODONE HYDROCHLORIDE 5 MG: 5 TABLET ORAL at 09:57

## 2022-11-07 RX ADMIN — OXYCODONE HYDROCHLORIDE 5 MG: 5 TABLET ORAL at 22:33

## 2022-11-07 RX ADMIN — GABAPENTIN 100 MG: 100 CAPSULE ORAL at 21:56

## 2022-11-07 RX ADMIN — METRONIDAZOLE 500 MG: 5 INJECTION, SOLUTION INTRAVENOUS at 11:29

## 2022-11-07 RX ADMIN — METRONIDAZOLE 500 MG: 5 INJECTION, SOLUTION INTRAVENOUS at 17:55

## 2022-11-07 RX ADMIN — METRONIDAZOLE 500 MG: 5 INJECTION, SOLUTION INTRAVENOUS at 04:37

## 2022-11-07 RX ADMIN — SODIUM CHLORIDE 5 ML: 9 INJECTION INTRAMUSCULAR; INTRAVENOUS; SUBCUTANEOUS at 08:10

## 2022-11-07 RX ADMIN — GABAPENTIN 100 MG: 100 CAPSULE ORAL at 17:54

## 2022-11-07 RX ADMIN — CEFTRIAXONE 2000 MG: 2 INJECTION, SOLUTION INTRAVENOUS at 19:40

## 2022-11-07 NOTE — TELEPHONE ENCOUNTER
IR Clinic Consult:    Patient clinical visit in 1 month      Schedule visit for the first available IR Physician: No                *If no, schedule for:   Francie    Type of Visit: Virtual Regular Visit - Video    Additional Details: Bo consult

## 2022-11-07 NOTE — PLAN OF CARE
Problem: Potential for Falls  Goal: Patient will remain free of falls  Description: INTERVENTIONS:  - Educate patient/family on patient safety including physical limitations  - Instruct patient to call for assistance with activity   - Consult OT/PT to assist with strengthening/mobility   - Keep Call bell within reach  - Keep bed low and locked with side rails adjusted as appropriate  - Keep care items and personal belongings within reach  - Initiate and maintain comfort rounds  - Apply yellow socks and bracelet for high fall risk patients  - Consider moving patient to room near nurses station  Outcome: Progressing     Problem: Prexisting or High Potential for Compromised Skin Integrity  Goal: Skin integrity is maintained or improved  Description: INTERVENTIONS:  - Identify patients at risk for skin breakdown  - Assess and monitor skin integrity  - Assess and monitor nutrition and hydration status  - Monitor labs   - Assess for incontinence   - Turn and reposition patient  - Assist with mobility/ambulation  - Relieve pressure over bony prominences  - Avoid friction and shearing  - Provide appropriate hygiene as needed including keeping skin clean and dry  - Evaluate need for skin moisturizer/barrier cream  - Collaborate with interdisciplinary team   - Patient/family teaching  - Consider wound care consult   Outcome: Progressing     Problem: MOBILITY - ADULT  Goal: Maintain or return to baseline ADL function  Description: INTERVENTIONS:  -  Assess patient's ability to carry out ADLs; assess patient's baseline for ADL function and identify physical deficits which impact ability to perform ADLs (bathing, care of mouth/teeth, toileting, grooming, dressing, etc )  - Assess/evaluate cause of self-care deficits   - Assess range of motion  - Assess patient's mobility; develop plan if impaired  - Assess patient's need for assistive devices and provide as appropriate  - Encourage maximum independence but intervene and supervise when necessary  - Involve family in performance of ADLs  - Assess for home care needs following discharge   - Consider OT consult to assist with ADL evaluation and planning for discharge  - Provide patient education as appropriate  Outcome: Progressing  Goal: Maintains/Returns to pre admission functional level  Description: INTERVENTIONS:  - Perform BMAT or MOVE assessment daily    - Set and communicate daily mobility goal to care team and patient/family/caregiver     - Collaborate with rehabilitation services on mobility goals if consulted  - Out of bed for toileting  - Record patient progress and toleration of activity level   Outcome: Progressing

## 2022-11-07 NOTE — ASSESSMENT & PLAN NOTE
Lab Results   Component Value Date    EGFR 25 11/07/2022    EGFR 29 11/06/2022    EGFR 34 11/05/2022    CREATININE 2 24 (H) 11/07/2022    CREATININE 2 00 (H) 11/06/2022    CREATININE 1 75 (H) 11/05/2022   · Following BMP

## 2022-11-07 NOTE — ASSESSMENT & PLAN NOTE
· Patient has a history of acute cholecystitis s/p percutaneous cholecystostomy tube  · Now presenting with LFT elevation and right upper quadrant pain  · CT abdomen/pelvis: No acute findings in the chest abdomen or pelvis Transhepatic percutaneous cholecystostomy tube remains in place  Persistent gallbladder inflammatory changes  · INR 3 49    Plan:  · Surgery consulted, suggested IR evaluation  · IR consult planning for tube check, discussed and they are hesitant to manipulate the tube while his INR is elevated  · GI was consulted regarding the possibility of acute cholangitis  No plan for ERCP or MRCP at this time  · Continue IV antibiotics with ceftriaxone and flagyl  Patient had had symptomatic improvement    · Continue to monitor CMP

## 2022-11-07 NOTE — PROGRESS NOTES
[facility]  Progress Note - Imagene Button 1935, 81 y o  male MRN: [id number]  Unit/Bed#: S -01 Encounter: [id number]  Primary Care Provider: Chivo De La Cruz DO   Date and time admitted to hospital: 11/4/2022  2:38 PM    * Acute on chronic cholecystitis  Assessment & Plan  · Patient has a history of acute cholecystitis s/p percutaneous cholecystostomy tube  · Now presenting with LFT elevation and right upper quadrant pain  · CT abdomen/pelvis: No acute findings in the chest abdomen or pelvis Transhepatic percutaneous cholecystostomy tube remains in place  Persistent gallbladder inflammatory changes  · INR 3 49    Plan:  · Surgery consulted, suggested IR evaluation  · IR consult planning for tube check, discussed and they are hesitant to manipulate the tube while his INR is elevated  · GI was consulted regarding the possibility of acute cholangitis  No plan for ERCP or MRCP at this time  · Continue IV antibiotics with ceftriaxone and flagyl  Patient had had symptomatic improvement  · Continue to monitor CMP      Leg pain  Assessment & Plan   The patient has been complaining of bilateral leg pain, likely secondary to his edema   Versus neuropathic pain  He describes it as a shooting pain, and that is exacerbated when he moves legs  -  Increase gabapentin to t i d  100 mg  Diastolic congestive heart failure [facility])  Assessment & Plan  Wt Readings from Last 3 Encounters:   10/25/22 (!) 137 kg (302 lb)   10/24/22 (!) 138 kg (305 lb 3 2 oz)   09/26/22 (!) 139 kg (307 lb)     ·  not currently diuresing    · Reassess volume status daily with daily weight and I/O         Stage 3a chronic kidney disease with elevated creatinine  Assessment & Plan  Lab Results   Component Value Date    EGFR 25 11/07/2022    EGFR 29 11/06/2022    EGFR 34 11/05/2022    CREATININE 2 24 (H) 11/07/2022    CREATININE 2 00 (H) 11/06/2022    CREATININE 1 75 (H) 11/05/2022   · Following BMP    Paroxysmal atrial fibrillation (HCC)  Assessment & Plan  · Hold warfarin, INR still supratherapeutic  ·  vitamin K administered on 2020 2:00 a m  · Continue sotalol    Thoracic back pain  Assessment & Plan  · Consider MRI if patient's pain better controlled or increased concern for bone infection  · He did not complain of back pain on 2022  OBINNA (obstructive sleep apnea)  Assessment & Plan  · Continue cpap    Thrombophlebitis  Assessment & Plan  · S/p IVC filter   · Warfarin held as above  · Patient ordered for Doppler of lower extremities for lower extremity pain  ·   Unrevealing for lower extremities and right  Upper extremity, demonstrated left upper extremity thrombophlebitis  ·   Ordered warm compresses for thrombophlebitis  VTE Pharmacologic Prophylaxis: VTE Score: 3 Moderate Risk (Score 3-4) - Pharmacological DVT Prophylaxis Contraindicated  Sequential Compression Devices Ordered  supratherapeutic INR    Patient Centered Rounds: I performed bedside rounds with nursing staff today  Discussions with Specialists or Other Care Team Provider:  Interventional Radiology  Education and Discussions with Family / Patient: Updated  (wife) at bedside  Current Length of Stay: 3 day(s)  Current Patient Status: Inpatient   Discharge Plan: Anticipate discharge in 48-72 hrs to home with home services  Code Status: Level 1 - Full Code    Subjective:     No acute events overnight  This morning the patient states Ace having bilateral leg pain, which  Had not been relieved with the gabapentin thus far  Objective:     Vitals:   Temp (24hrs), Av 1 °F (37 3 °C), Min:98 9 °F (37 2 °C), Max:99 2 °F (37 3 °C)    Temp:  [98 9 °F (37 2 °C)-99 2 °F (37 3 °C)] 99 2 °F (37 3 °C)  HR:  [68-75] 75  Resp:  [16-18] 16  BP: (113-124)/(50-65) 124/65  SpO2:  [95 %-100 %] 95 %  Body mass index is 38 8 kg/m²  Input and Output Summary (last 24 hours):      Intake/Output Summary (Last 24 hours) at 11/7/2022 1543  Last data filed at 11/7/2022 0900  Gross per 24 hour   Intake 125 ml   Output 15 ml   Net 110 ml       Physical Exam:   Physical Exam  Constitutional:       General: He is not in acute distress  Appearance: He is not toxic-appearing  HENT:      Head: Normocephalic and atraumatic  Nose: Nose normal       Mouth/Throat:      Mouth: Mucous membranes are moist       Pharynx: No oropharyngeal exudate or posterior oropharyngeal erythema  Eyes:      Conjunctiva/sclera: Conjunctivae normal       Pupils: Pupils are equal, round, and reactive to light  Cardiovascular:      Rate and Rhythm: Normal rate and regular rhythm  Pulses: Normal pulses  Heart sounds: Normal heart sounds  Pulmonary:      Breath sounds: Normal breath sounds  No wheezing, rhonchi or rales  Abdominal:      General: Bowel sounds are normal  There is no distension  Palpations: Abdomen is soft  Tenderness: There is abdominal tenderness  Comments: He has some tenderness when the ostomy site is manipulated, but no tenderness otherwise around his abdomen  Musculoskeletal:         General: No deformity  Cervical back: Neck supple  Right lower leg: Edema present  Left lower leg: Edema present  Comments: Tenderness on dorsiflexion of the bilateral feet  Tenderness on palpation of bilateral calves  Skin:     General: Skin is warm and dry  Comments: Evidence of chronic venous stasis in the lower leg  Neurological:      General: No focal deficit present  Mental Status: He is alert and oriented to person, place, and time  Psychiatric:         Mood and Affect: Mood normal          Behavior: Behavior normal       Comments: Alert and oriented x3            Additional Data:     Labs:  Results from last 7 days   Lab Units 11/07/22  0501 11/06/22  0443 11/05/22  0754   WBC Thousand/uL 9 05   < > 9 25   HEMOGLOBIN g/dL 8 3*   < > 8 3*   HEMATOCRIT % 27 3*   < > 26 2* PLATELETS Thousands/uL 304   < > 277   NEUTROS PCT %  --   --  68   LYMPHS PCT %  --   --  16   MONOS PCT %  --   --  14*   EOS PCT %  --   --  1    < > = values in this interval not displayed  Results from last 7 days   Lab Units 11/07/22  0501   SODIUM mmol/L 137   POTASSIUM mmol/L 3 7   CHLORIDE mmol/L 100   CO2 mmol/L 26   BUN mg/dL 47*   CREATININE mg/dL 2 24*   ANION GAP mmol/L 11   CALCIUM mg/dL 8 2*   ALBUMIN g/dL 2 9*   TOTAL BILIRUBIN mg/dL 1 09*   ALK PHOS U/L 97   ALT U/L 28   AST U/L 36   GLUCOSE RANDOM mg/dL 117     Results from last 7 days   Lab Units 11/07/22  0501   INR  5 90*     Results from last 7 days   Lab Units 11/07/22  1146 11/07/22  0810 11/06/22  2028   POC GLUCOSE mg/dl 112 114 125         Results from last 7 days   Lab Units 11/04/22  1535   LACTIC ACID mmol/L 1 7   PROCALCITONIN ng/ml 0 07       Lines/Drains:  Invasive Devices  Report    Peripheral Intravenous Line  Duration           Peripheral IV 11/04/22 Right Antecubital 3 days          Drain  Duration           Cholecystostomy Tube 11 days                  Telemetry:  Telemetry Orders (From admission, onward)             48 Hour Telemetry Monitoring  Continuous x 48 hours        References:    Telemetry Guidelines   Question:  Reason for 48 Hour Telemetry  Answer:  Arrhythmias Requiring Medical Therapy (eg  SVT, Vtach/fib, Bradycardia, Uncontrolled A-fib)                 Telemetry Reviewed: Normal Sinus Rhythm  Indication for Continued Telemetry Use: No indication for continued use  Will discontinue  Imaging: No pertinent imaging reviewed  Recent Cultures (last 7 days):   Results from last 7 days   Lab Units 11/04/22  1535   BLOOD CULTURE  No Growth at 48 hrs  No Growth at 48 hrs         Last 24 Hours Medication List:   Current Facility-Administered Medications   Medication Dose Route Frequency Provider Last Rate   • acetaminophen  650 mg Oral Q6H [facility] Rocky Lopez DO     • cefTRIAXone  2,000 mg Intravenous Q24H Vertis Seats, DO Stopped (11/06/22 2100)   • gabapentin  100 mg Oral TID Zuleyka Harris MD     • HYDROmorphone  0 2 mg Intravenous Q4H PRN Vertis Seats, DO     • insulin lispro  2-12 Units Subcutaneous 4x Daily (AC & HS) Ayo Mcginnis, DO     • metroNIDAZOLE  500 mg Intravenous Q8H Vertis Seats,  mg (11/07/22 1129)   • oxyCODONE  2 5 mg Oral Q6H PRN Lakisha Primas, DO     • oxyCODONE  5 mg Oral Q6H PRN Lakisha Primas, DO     • pantoprazole  40 mg Oral Early Morning Vertis Seats, DO     • potassium chloride  40 mEq Oral Once Zuleyka Harris MD     • sodium chloride (PF)  5 mL Intracatheter Daily Zuleyka Harris MD     • sotalol  80 mg Oral Daily Vertis Seats, DO          Today, Patient Was Seen By: Zuleyka Harris    **Please Note: This note may have been constructed using a voice recognition system  **

## 2022-11-07 NOTE — CASE MANAGEMENT
Case Management Assessment & Discharge Planning Note    Patient name Vu Stoner  Location S /S -79 MRN [id number]  : 1935 Date 2022       Current Admission Date: 2022  Current Admission Diagnosis:Acute on chronic cholecystitis   Patient Active Problem List    Diagnosis Date Noted   • Leg pain 2022   • Thoracic back pain 2022   • Acute on chronic cholecystitis 2022   • Bacteremia 10/23/2022   • Acute cholecystitis 2022   • Benign prostatic hyperplasia with lower urinary tract symptoms 2022   • OBINNA (obstructive sleep apnea)    • Stage 3a chronic kidney disease with elevated creatinine 2022   • Acute kidney injury ([address] ) 2021   • Osteoarthritis, knee 11/15/2021   • Status post right knee replacement 11/15/2021   • Mixed hyperlipidemia 2021   • Swelling of limb 2021   • Chronic deep vein thrombosis (DVT) ([facility] ) 09/10/2020   • Secondary lymphedema 2018   • Venous ulcer of ankle, right ([address] ) 2018   • S/P AVR 2018   • Thrombophlebitis 2018   • Presence of implantable cardioverter-defibrillator (ICD) 2018   • Hematuria, gross 2017   • Chronic bilateral low back pain without sciatica 2017   • Compression fracture of L4 lumbar vertebra 2017   • BPH (benign prostatic hyperplasia) 2017   • Obesity, unspecified obesity severity, unspecified obesity type 2017   • Hydrocele 2017   • Chronic anticoagulation 2017   • Anemia 2017   • Renal cyst 2017   • Urinary tract infection, acute 2017   • Epididymitis 2017   • Cellulitis of scrotum 2017   • Facial cellulitis 2016   • Dental abscess 2016   • 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 2015   • Ventricular tachycardia 04/15/2014   • Paroxysmal atrial fibrillation ([address] ) 2014   • Edema    • Diastolic congestive heart failure ([address] ) 2014 • Endocarditis 03/19/2014   • Left knee pain 03/14/2014   • Other disorder of calcium metabolism 03/14/2014   • Type 2 diabetes mellitus with diabetic neuropathy, without long-term current use of insulin ([facility] ) 08/19/2013   • Peripheral venous insufficiency 08/19/2013   • Cardiomegaly 08/19/2013   • PVD (peripheral vascular disease) ([facility] ) 04/19/2013   • Dyslipidemia 04/19/2013      LOS (days): 3  Geometric Mean LOS (GMLOS) (days): 4 40  Days to GMLOS:1 5     OBJECTIVE:  PATIENT READMITTED TO HOSPITAL  Risk of Unplanned Readmission Score: 30 01         Current admission status: Inpatient       Preferred Pharmacy:   [address]  [address] [address]  Phone: [phone] Fax: [phone] [address] ([place]) [facility], [address] [facility] [address] [facility]  [address] [place] [address]  Phone: [phone] Fax: [phone]    Primary Care Provider: Meryl Taylor DO    Primary Insurance: MEDICARE  Secondary Insurance: [facility] HEALTH OPTIONS PROGRAM    ASSESSMENT:  Active Health Care Proxies    There are no active Health Care Proxies on file  Readmission Root Cause  30 Day Readmission: Yes  Who directed you to return to the hospital?: [facility]  Did you understand whom to contact if you had questions or problems?: Yes  Did you take your medications as prescribed?: Yes  During previous admission, was a post-acute recommendation made?: Yes  What post-acute resources were offered?: Cheryle Sullivan  Patient was readmitted due to: Acute on chronic cholecystitis  Action Plan: IR to check Gail tube   Plan to return home with [facility]-currently declining IP rehab    Patient Information  Admitted from[de-identified] Home  Mental Status: Alert  During Assessment patient was accompanied by: Spouse  Assessment information provided by[de-identified] Patient, Spouse  Primary Caregiver: Self  Support Systems: Spouse/significant other, Children  What city do you live in?: [facility] entry access options   Select all that apply : Stairs, Garage  Number of steps to enter home : 1  Type of Current Residence: [place]  In the last 12 months, was there a time when you were not able to pay the mortgage or rent on time?: Yes  In the last 12 months, how many places have you lived?: 1  In the last 12 months, was there a time when you did not have a steady place to sleep or slept in a shelter (including now)?: No  Homeless/housing insecurity resource given?: N/A  Living Arrangements: Lives w/ Spouse/significant other  Is patient a ?: No    Activities of Daily Living Prior to Admission  Functional Status: Independent  Completes ADLs independently?: Yes  Ambulates independently?: Yes  Does patient use assisted devices?: No  Does patient currently own DME?: Yes  What DME does the patient currently own?: [employer] [place], Wheelchair  Does patient have a history of Outpatient Therapy (PT/OT)?: Yes  Does the patient have a history of Short-Term Rehab?: No  Does patient have a history of HHC?: Yes (JOELNA)  Does patient currently have [facility]?: Yes    Current Home Health Care  Type of Current Home Care Services: Nurse visit  Current Home Health Agency[de-identified] [facility] Provider[de-identified] PCP    Patient Information Continued  Does patient have prescription coverage?: Yes  Within the past 12 months, you worried that your food would run out before you got the money to buy more : Never true  Within the past 12 months, the food you bought just didn't last and you didn't have money to get more : Never true  Food insecurity resource given?: N/A  Does patient receive dialysis treatments?: No  Does patient have a history of substance abuse?: No  Does patient have a history of Mental Health Diagnosis?: No    Means of Transportation  Means of Transport to Appts[de-identified] Drives Self  In the past 12 months, has lack of transportation kept you from medical appointments or from getting medications?: No  In the past 12 months, has lack of transportation kept you from meetings, work, or from getting things needed for daily living?: No  Was application for public transport provided?: N/A    DISCHARGE DETAILS:    Discharge planning discussed with[de-identified] Patient and spouse at bedside  Freedom of Choice: Yes  Comments - Freedom of Choice: FOC discussed regarding therapy recs for IP rehab  Patient and spouse currently declining, in favor of home PT/OT with [facility] as their RN currently is provided by [facility]  Patient and spouse will think about IP rehab as an option, but currently are not interested    CM contacted family/caregiver?: Yes  Were Treatment Team discharge recommendations reviewed with patient/caregiver?: Yes  Did patient/caregiver verbalize understanding of patient care needs?: Yes  Were patient/caregiver advised of the risks associated with not following Treatment Team discharge recommendations?: Yes    Contacts  Patient Contacts: Patient and spouse-Paloma  Relationship to Patient[de-identified] Family  Reason/Outcome: Continuity of Care, Referral, Discharge [address]         Is the patient interested in [address] at discharge?: Yes  [address] requested[de-identified] Nursing, Occupational Therapy, Physical [address] [place] [address] Name[de-identified] [facility] Provider[de-identified] PCP  Home Health Services Needed[de-identified] Evaluate Functional Status and Safety, Gait/ADL Training, Strengthening/Theraputic Exercises to Improve Function, Wound/Ostomy Care  Homebound Criteria Met[de-identified] Requires the Assistance of Another Person for Safe Ambulation or to Leave the Home, Uses an Assist Device (i e  cane, walker, etc)  Supporting Clincal Findings[de-identified] Fatigues Easliy in [employer], Limited Endurance    Other Referral/Resources/Interventions Provided:  Interventions: [facility]  Referral Comments: CM placed referral to [facility] per request of pt and family    Treatment Team Recommendation: Short Term Rehab  Discharge Destination Plan[de-identified] Home with [facility] Given (Date):: 11/04/22

## 2022-11-07 NOTE — PLAN OF CARE
Problem: Potential for Falls  Goal: Patient will remain free of falls  Description: INTERVENTIONS:  - Educate patient/family on patient safety including physical limitations  - Instruct patient to call for assistance with activity   - Consult OT/PT to assist with strengthening/mobility   - Keep Call bell within reach  - Keep bed low and locked with side rails adjusted as appropriate  - Keep care items and personal belongings within reach  - Initiate and maintain comfort rounds  - Make Fall Risk Sign visible to staff  - Offer Toileting every 2 Hours, in advance of need  - Initiate/Maintain bed alarm  - Obtain necessary fall risk management equipment: bed alarm  - Apply yellow socks and bracelet for high fall risk patients  - Consider moving patient to room near nurses station  Outcome: Progressing     Problem: Prexisting or High Potential for Compromised Skin Integrity  Goal: Skin integrity is maintained or improved  Description: INTERVENTIONS:  - Identify patients at risk for skin breakdown  - Assess and monitor skin integrity  - Assess and monitor nutrition and hydration status  - Monitor labs   - Assess for incontinence   - Turn and reposition patient  - Assist with mobility/ambulation  - Relieve pressure over bony prominences  - Avoid friction and shearing  - Provide appropriate hygiene as needed including keeping skin clean and dry  - Evaluate need for skin moisturizer/barrier cream  - Collaborate with interdisciplinary team   - Patient/family teaching  - Consider wound care consult   Outcome: Progressing     Problem: MOBILITY - ADULT  Goal: Maintain or return to baseline ADL function  Description: INTERVENTIONS:  -  Assess patient's ability to carry out ADLs; assess patient's baseline for ADL function and identify physical deficits which impact ability to perform ADLs (bathing, care of mouth/teeth, toileting, grooming, dressing, etc )  - Assess/evaluate cause of self-care deficits   - Assess range of motion  - Assess patient's mobility; develop plan if impaired  - Assess patient's need for assistive devices and provide as appropriate  - Encourage maximum independence but intervene and supervise when necessary  - Involve family in performance of ADLs  - Assess for home care needs following discharge   - Consider OT consult to assist with ADL evaluation and planning for discharge  - Provide patient education as appropriate  Outcome: Progressing  Goal: Maintains/Returns to pre admission functional level  Description: INTERVENTIONS:  - Perform BMAT or MOVE assessment daily    - Set and communicate daily mobility goal to care team and patient/family/caregiver  - Collaborate with rehabilitation services on mobility goals if consulted  - Perform Range of Motion 2 times a day  - Reposition patient every 2 hours    - Dangle patient 2 times a day  - Stand patient 2 times a day  - Ambulate patient 3 times a day  - Out of bed to chair 3 times a day   - Out of bed for meals 3 times a day  - Out of bed for toileting  - Record patient progress and toleration of activity level   Outcome: Progressing

## 2022-11-07 NOTE — Clinical Note
Nsg staff verbally cleared pt for OT evaluation  Pt received  {pmlocation:99313::"supine in bed"} on this date reporting {pmpain:40226} {pmd:51373} *** + is agreeable to OT session @ this time  @ exit, pt remains in  {pmlocation:72661} c all lines intact, all needs met, call bell in hand + nsg aware of location/disposition  @ {pmloc:29262} {pmbody:72759}     During evaluation, {pmwho:11636} present in room  Pt lives in  {pmhome:02378} {pmwho:88421} + @ baseline, performs ADLs  {pmlevels:59407}, IADLs {pmlevels:90584} + fxl mobility {pmlevels:83096} {pmad:47907}  ({pmplus:79370})   {pmemp:52430}        Given fxl performance skills + medical complexity, therapist suspecting via clinical judgement + skilled analysis; pt currently requires stated assist above to perform each area of ADL d/t limitations including: {pmdeficits:08349}      {pmdnt:53807}      Pt performed short distance ADL related fxl mobility {pmfxlmob:86156} {pmassistlevels:50723} {pmad:81604} simulating toileting distances  {pmpain:76017} overt LOB demonstrated + pt {pmc:32217} {pmd:30219} /  {pmpain:58897} SOB/ {pmpain:54301} dizziness during transitional movements  Reports feeling {pmst:98764}  {pmgf:06087} safety awareness noted while navigating t/o room  {pmfxl:77899}    @ {pmloc:89497} {pmbody:94164}         PT/OT co-eval on this date d/t medical complexity, ambulatory dysfunction c high fall risk, various impeding lines + requirement for skilled interdisciplinary analysis of appropriate d/c recommendations  PT/OT POC/goals I'ly determined per respective discipline  ({pmpt:02902})    Medical staff made aware of current fxn, recommendations for d/c planning, fall risk + pt's location upon exit  (***)    None reported  No acute changes reported since hospitalization  SBT administered: scored *** indicating {pmsbt:72605} cognitive impairment      1: 0 OR 1 (year)                ___ x 4 =  2: 0 OR 1 (month)                 ___ x 3 =  3: 0 OR 1 (time)                 ___ x 3 =  4: 0, 1 OR 2 (20-1)     ___ x 2 =  5: 0, 1 OR 2  (months)    ___ x 2 =  6: 0, 1, 2, 3, 4 or 5 (name/add)  ___ x 2 =    0-4= normal cognition   5-9= questionable impairment   >10= impairment consistent c dementia          Patient is a 81 y o  male seen for OT evaluation s/p admit to  {PM location:53520} on 11/4/2022 w/Acute on chronic cholecystitis + commorbidities/PMHx (as listed in medical record) affecting patient's functional performance c ADL tasks at time of assessment  OT orders placed for evaluation and treatment to assess pt's ADLs, cognitive status + performance during functional tasks in order to formulate appropriate d/c recommendations  Therapist performed at least two patient identifiers during session including name and wristband  Personal factors affecting patient at time of initial evaluation include: {NCPERSONALFACTORS:84275}  Pt's clinical presentation is currently {MSLstable:32120} given new onset deficits that effect pt's occupational performance and ability to safely return to PLOF including {NCOTdeficits:74466} combined with medical complications of {GZKVJAMJRAMURVLCWVFXGD:19979}  This evaluation required an extensive review of medical and/or therapy records and additional review of physical, cognitive and psychosocial history related to functional performance  Based upon functional performance deficits and assessments, this evaluation has been identified as a  {pmcomplex:85107} complexity evaluation  Patient to benefit from continued Occupational Therapy treatment while in the hospital to address aforementioned deficits and maximize level of functional independence with ADLs and functional mobility  Pt currently {pmdc:42419}  From OT standpoint, recommendation at time of d/c would be {RV recommendation:36575}  as pt appears to be at baseline level of function   Pt may benefit from rehab if  wife is unable to provide physical assist for mobility and ADLs    @ this time, pt presents @ baseline fxn including I c ADLs/fxl mobility  D/t aforementioned factors, no further skilled occupational therapy services warranted during hospitalization/ following return home

## 2022-11-07 NOTE — CONSULTS
e-Consult (IPC)  - Interventional Radiology  Gale Breaux 81 y o  male MRN: [id number]  Unit/Bed#: S -01 Encounter: [id number]          Interventional Radiology has been consulted to evaluate Gale Breaux    We were consulted by [facility] concerning this patient with decreased output from percutaneous cholecystostomy tube  Consults  11/07/22    Assessment/Recommendation:   Pt on Coumadin with supratherapeutic INR at 5 90, which is too high for tube change  Will plan on a tube check to determine tube patency and cystic duct patency and if indicated, can change tube when INR < 1 5    11-20 minutes, >50% of the total time devoted to medical consultative verbal/EMR discussion between providers  Written report will be generated in the EMR  Thank you for allowing Interventional Radiology to participate in the care of Gale Breaux  Please don't hesitate to call or TigerText us with any questions       Joshua Ames MD

## 2022-11-07 NOTE — TELEPHONE ENCOUNTER
Wife Guillermina Butterfield called to cx 11/7 appointment due to Winnie Handley being in the hospital

## 2022-11-07 NOTE — PLAN OF CARE
Problem: PHYSICAL THERAPY ADULT  Goal: Performs mobility at highest level of function for planned discharge setting  See evaluation for individualized goals  Description: Treatment/Interventions: Functional transfer training, LE strengthening/ROM, Therapeutic exercise, Endurance training, Patient/family training, Equipment eval/education, Bed mobility, Gait training  Equipment Recommended: Yecenia Magana       See flowsheet documentation for full assessment, interventions and recommendations  11/7/2022 1717 by Joy Hyman, PT  Note:    Problem List: Decreased strength, Decreased endurance, Impaired balance, Decreased mobility, Pain, Obesity, Impaired sensation  Assessment: Arielle Hinton is a 81 y o  Male who [facility] on 11/4/2022 from home w/ c/o generalized weakness and diagnosis of acute on chronic cholecystitis  Orders for PT eval and treat received  Pt presents w/ comorbidities of Afib, CHF, OBINNA, DMII, s/p AVR, PVD, secondary lymphedema  At baseline, pt mobilizes mod I w/ RW, and reports 0 falls in the last 6 months  Upon evaluation, pt presents w/ the following deficits: weakness, altered sensation, edema of extremities, impaired balance, decreased endurance and pain limiting functional mobility  Upon eval, pt requires min A x 1 for bed mobility, max A x 2 for transfers, and mod A x 2 for gait  Pt's clinical presentation is unstable/unpredictable due to need for assist w/ all phases of mobility when usually mobilizing independently, pain impacting overall mobility status, need for input for mobility technique and recent drastic decline in mobility compared to baseline  Patient is at an increased risk of falls due to physical deficits (weakness and especially pain)  Given the above findings, discharge recommendation is for Post-acute inpatient rehabilitation   During this admission, pt would benefit from continued skilled inpatient PT in the acute care setting in order to address the abovementioned deficits to maximize function and mobility before DC from acute care  PT Discharge Recommendation: Post acute rehabilitation services    See flowsheet documentation for full assessment

## 2022-11-07 NOTE — PLAN OF CARE
Problem: OCCUPATIONAL THERAPY ADULT  Goal: Performs self-care activities at highest level of function for planned discharge setting  See evaluation for individualized goals  Description: Treatment Interventions: ADL retraining, Functional transfer training, UE strengthening/ROM, Endurance training, Equipment evaluation/education, Compensatory technique education, Energy conservation, Activityengagement          See flowsheet documentation for full assessment, interventions and recommendations  Note: Limitation: Decreased ADL status, Decreased UE strength, Decreased endurance, Decreased high-level ADLs, Decreased self-care trans  Prognosis: Fair  Assessment: Patient is a 81 y o  male seen for OT evaluation s/p admit to  [address] [place] [address] on 11/4/2022 w/Acute on chronic cholecystitis + commorbidities/PMHx (as listed in medical record) affecting patient's functional performance c ADL tasks at time of assessment  OT orders placed for evaluation and treatment to assess pt's ADLs, cognitive status + performance during functional tasks in order to formulate appropriate d/c recommendations  Therapist performed at least two patient identifiers during session including name and wristband  Personal factors affecting patient at time of initial evaluation include: limited home support, advanced age, inability to perform current job functions, inability to perform IADLs, inability to perform ADLs, inability to ambulate household distances and inability to navigate community distances     Pt's clinical presentation is currently unstable/unpredictable given new onset deficits that effect pt's occupational performance and ability to safely return to OF including decrease activity tolerance, decrease standing balance, decrease sitting balance, decrease performance during ADL tasks, increased pain, decrease generalized strength, decrease activity engagement, decrease performance during functional transfers and high fall risk combined with medical complications of pain impacting overall mobility status, abnormal renal lab values, abnormal CBC, multiple readmissions and need for input for mobility technique/safety  This evaluation required an extensive review of medical and/or therapy records and additional review of physical, cognitive and psychosocial history related to functional performance  Based upon functional performance deficits and assessments, this evaluation has been identified as a  high complexity evaluation  Patient to benefit from continued Occupational Therapy treatment while in the hospital to address aforementioned deficits and maximize level of functional independence with ADLs and functional mobility  Pt currently demonstrates WFL ability to collaboratively engage in d/c planning, supportive family involved, demonstrates G awareness in d/c plan and is agreeable to d/c plan including transition to STR (as able)  From OT standpoint, recommendation at time of d/c would be Short Term Rehab       OT Discharge Recommendation: Post acute rehabilitation services

## 2022-11-07 NOTE — PHYSICAL THERAPY NOTE
PHYSICAL THERAPY EVALUATION NOTE    Patient Name: Scarlett LORD Date: 2022    AGE:   81 y o  Mrn:   [id number]  ADMIT DX:  Cholangitis [K83 09]  Cholecystitis [K81 9]  Altered mental status [R41 82]  Weakness [R53 1]  RUQ pain [R10 11]  Dysphagia [R13 10]  Cholecystostomy tube dysfunction [T85 518A]    Past Medical History:   Diagnosis Date    Anemia     Arthritis     Atrial fibrillation ([facility] [address] )     Basal cell carcinoma 2022    Tip of Nose    CHF (congestive heart failure) (HCC)     Diabetes mellitus (HCC)     Niddm    DVT (deep vein thrombosis) in pregnancy 1966    not in pregnancy    DVT (deep venous thrombosis) ([facility] )     Dyslipidemia     Encephalopathy acute 2022    GERD (gastroesophageal reflux disease)     Hyperlipidemia     Hypertension     Hypomagnesemia 10/19/2022    Irregular heart beat     Afib    Morbid obesity due to excess calories ([facility] )     Resolved 2014     Pulmonary embolism (HCC)     Sepsis ([facility] )     Squamous cell skin cancer 2020    Left posterior scalp    Visual impairment      Length Of Stay: 3  PHYSICAL THERAPY EVALUATION :   Patient's identity confirmed via 2 patient identifiers (full name and ) at start of session       22 1244   PT Last Visit   PT Visit Date 22   Note Type   Note type Evaluation   Pain Assessment   Pain Assessment Tool 0-10   Pain Score 10 - Worst Possible Pain   Pain Location/Orientation Orientation: Bilateral;Location: Foot   Pain Onset/Description Descriptor: Sharp  (with weight bearing, pressure, touch)   Effect of Pain on Daily Activities limits activity tolerance   Patient's Stated Pain Goal No pain   Hospital Pain Intervention(s) Medication (See MAR); Repositioned; Ambulation/increased activity; Emotional support   Restrictions/Precautions   Weight Bearing Precautions Per Order No   Other Precautions Chair Alarm; Bed Alarm; Fall Risk;Pain Home Living   Type of [address] [place] [address] Two level; Able to live on main level with bedroom/bathroom  (0 RADHA)   Bathroom Shower/Tub Walk-in shower   Home Equipment Walker;Cane   Additional Comments Pt reports using RW PTA   Prior Function   Level of [place] Independent with functional mobility; Independent with ADLs; Needs assistance with IADLS   Lives With Spouse; Family   Receives Help From Family;Home health  (getting HHPT [facility] the end of October)   IADLs Independent with driving;Family/Friend/Other provides meals; Family/Friend/Other provides medication management   Falls in the last 6 months 0   Vocational Self employed  ( for [employer])   Comments Pt reports using SPC for mobility at baseline in the home and RW in the community  Pt was S for all functional mobility and 70 ft w/ RW when seen w/ PT during last admission on 10/24/2022  Pt reports sudden worsening mobility due to significant pain in BLEs   General   Family/Caregiver Present Yes  (Wife, Paloma)   Cognition   Overall Cognitive Status WFL   Arousal/Participation Alert   Attention Within functional limits   Orientation Level Oriented X4   Memory Within functional limits   Following Commands Follows all commands and directions without difficulty   Comments Pt is very pleasant and agreeable to participate in PT evaluation  Motivated and accepting of all education and cues provided by therapists  Pt verbalizes understandable frustration re: this BLE pain limiting his mobility   Subjective   Subjective "it feels like someone shot an arrow through my leg"   Strength RLE   RLE Overall Strength 3/5   Strength LLE   LLE Overall Strength 3/5   Light Touch   RLE Light Touch Impaired   RLE Light Touch Comments hypersensitive   LLE Light Touch Impaired   LLE Light Touch Comments hypersensitive   Bed Mobility   Supine to Sit 4  Minimal assistance   Additional items Assist x 1; Increased time required;Verbal cues   Transfers   Sit to Stand 2  Maximal assistance   Additional items Assist x 2; Increased time required;Verbal cues   Stand to Sit 2  Maximal assistance   Additional items Assist x 2; Increased time required;Verbal cues   Additional Comments Attempted STS from EOB x 4 during session  Elevated height of bed twice  Ambulation/Elevation   Gait pattern Decreased foot clearance; Short stride; Excessively slow   Gait Assistance 3  Moderate assist   Additional items Assist x 2;Verbal cues; Tactile cues   Assistive Device Bariatric Rolling walker   Distance 3 lateral steps towards [facility]   Ambulation/Elevation Additional Comments Further mobility deferred due to significant pain  Deferred attempting OOB to recliner chair due to pt having to go down to IR soon   Balance   Static Sitting Fair +   Static Standing Zero  (Ax2)   Ambulatory Zero  (Ax2)   Activity Tolerance   Activity Tolerance Patient limited by pain   Medical Staff Aileen coordination w/ OT Lesley   Nurse Made Aware Spoke to JEET Dial   Assessment   Problem List Decreased strength;Decreased endurance; Impaired balance;Decreased mobility;Pain;Obesity; Impaired sensation   Assessment Travis Pedro is a 81 y o  Male who [facility] on 11/4/2022 from home w/ c/o generalized weakness and diagnosis of acute on chronic cholecystitis  Orders for PT eval and treat received  Pt presents w/ comorbidities of Afib, CHF, OBINNA, DMII, s/p AVR, PVD, secondary lymphedema  At baseline, pt mobilizes mod I w/ RW, and reports 0 falls in the last 6 months  Upon evaluation, pt presents w/ the following deficits: weakness, altered sensation, edema of extremities, impaired balance, decreased endurance and pain limiting functional mobility  Upon eval, pt requires min A x 1 for bed mobility, max A x 2 for transfers, and mod A x 2 for gait   Pt's clinical presentation is unstable/unpredictable due to need for assist w/ all phases of mobility when usually mobilizing independently, pain impacting overall mobility status, need for input for mobility technique and recent drastic decline in mobility compared to baseline  Patient is at an increased risk of falls due to physical deficits (weakness and especially pain)  Given the above findings, discharge recommendation is for Post-acute inpatient rehabilitation  During this admission, pt would benefit from continued skilled inpatient PT in the acute care setting in order to address the abovementioned deficits to maximize function and mobility before DC from acute care  Goals   Patient Goals to have less pain   STG Expiration Date 11/17/22   Short Term Goal #1 Patient will: Increase bilateral LE strength 1/2 grade to facilitate independent mobility, Perform all bed mobility tasks w/ supervision to decrease fall risk factors, Perform all transfers w/ modx1 to improve independence, Ambulate at least 50 ft  with roller walker w/ modx1 w/o LOB, Increase all balance 1/2 grade to decrease risk for falls, Complete exercise program independently and Tolerate 3 hr OOB to faciliate upright tolerance   PT Treatment Day 0   Plan   Treatment/Interventions Functional transfer training;LE strengthening/ROM; Therapeutic exercise; Endurance training;Patient/family training;Equipment eval/education; Bed mobility;Gait training   PT Frequency 3-5x/wk   Recommendation   PT Discharge Recommendation Post acute rehabilitation services   Equipment Recommended [address] Mobility Inpatient   Turning in Bed Without Bedrails 3   Lying on Back to Sitting on Edge of Flat Bed 3   Moving Bed to Chair 1   Standing Up From Chair 1   Walk in Room 1   Climb 3-5 Stairs 1   Basic Mobility Inpatient Raw Score 10   Turning Head Towards Sound 4   Follow Simple Instructions 4   Low Function Basic Mobility Raw Score 18   Low Function Basic Mobility Standardized Score 29 25   Highest Level Of Mobility   -[facility] [place] [facility] Goal 4: Move to chair/commode   -HL Achieved 5: Stand (1 or more minutes)   Additional Treatment Session   Start Time 1300   End Time 1310   Treatment Assessment Pt very pleasant and agreeable to participate in PT intervention  Pt is able to perform lateral scooting at EOB w/ min A x 1 and use of delvin pad to assist  Pt mostly using BUE support to assist due to LE pain w/ weightbearing tasks  Pt requesting to sit EOB  Pt noted to have fair + balance while sitting EOB, wife and daughter present in room  Provided education to ring callbell when pt wants to return supine in bed (JEET Wise arriving soon anyway due to pt having to go to IR for a procedure)  Pt seated EOB x 5 minutes prior to therapist exit  Additional Treatment Day 1   End of Consult   Patient Position at End of Consult Seated edge of bed; All needs within reach     *attempted to don pt's shoes for session so he could have more cushion/potentially less pain when standing  However pt's feet are so sensitive/has so much pain, OT was unable to don his shoes    The patient's AM-PAC Basic Mobility Inpatient Short Form Raw Score is 10, Standardized Score is    A standardized score less than 38 32 (raw score of 16) suggests the patient may benefit from discharge to post-acute rehabilitation services which may not coincide with above PT recommendations  However please refer to therapist recommendation for discharge planning given other factors that may influence destination      Pt would benefit from skilled inpatient PT during this admission in order to facilitate progress towards goals to maximize functional independence    Ramos Grider, PT, DPT

## 2022-11-07 NOTE — ASSESSMENT & PLAN NOTE
The patient has been complaining of bilateral leg pain, likely secondary to his edema   Versus neuropathic pain  He describes it as a shooting pain, and that is exacerbated when he moves legs  -  Increase gabapentin to t i d  100 mg

## 2022-11-07 NOTE — ASSESSMENT & PLAN NOTE
· S/p IVC filter   · Warfarin held as above  · Patient ordered for Doppler of lower extremities for lower extremity pain  ·   Unrevealing for lower extremities and right  Upper extremity, demonstrated left upper extremity thrombophlebitis  ·   Ordered warm compresses for thrombophlebitis

## 2022-11-07 NOTE — OCCUPATIONAL THERAPY NOTE
Occupational Therapy Evaluation     Patient Name: Tere QUINTANILLATP'D Date: 11/7/2022  Problem List  Principal Problem:    Acute on chronic cholecystitis  Active Problems:    Paroxysmal atrial fibrillation (HCC)    DVT (deep venous thrombosis) (HCC)    Stage 3a chronic kidney disease with elevated creatinine    Diastolic congestive heart failure (HCC)    OBINNA (obstructive sleep apnea)    Thoracic back pain    Past Medical History  Past Medical History:   Diagnosis Date    Anemia     Arthritis     Atrial fibrillation ([address] )     Basal cell carcinoma 03/22/2022    Tip of Nose    CHF (congestive heart failure) (HCC)     Diabetes mellitus ([facility] [address] )     Niddm    DVT (deep vein thrombosis) in pregnancy 1966    not in pregnancy    DVT (deep venous thrombosis) ([facility] [address] ) 1966    Dyslipidemia     Encephalopathy acute 11/4/2022    GERD (gastroesophageal reflux disease)     Hyperlipidemia     Hypertension     Hypomagnesemia 10/19/2022    Irregular heart beat     Afib    Morbid obesity due to excess calories ([facility] [address] )     Resolved 9/2/2014     Pulmonary embolism (HCC)     Sepsis (HCC)     Squamous cell skin cancer 07/30/2020    Left posterior scalp    Visual impairment      Past Surgical History  Past Surgical History:   Procedure Laterality Date    AORTIC VALVE REPLACEMENT      CARDIAC DEFIBRILLATOR PLACEMENT  04/2014    CARDIAC SURGERY  02/2014    AVR    COLONOSCOPY      INSERT / REPLACE / REMOVE PACEMAKER      IR CHOLECYSTOSTOMY TUBE CHECK/CHANGE/REPOSITION/REINSERTION/UPSIZE  10/13/2022    IR CHOLECYSTOSTOMY TUBE CHECK/CHANGE/REPOSITION/REINSERTION/UPSIZE  10/19/2022    IR CHOLECYSTOSTOMY TUBE CHECK/CHANGE/REPOSITION/REINSERTION/UPSIZE  10/27/2022    IR CHOLECYSTOSTOMY TUBE PLACEMENT  9/1/2022    JOINT REPLACEMENT Left     LTKR    MOHS SURGERY  07/30/2020    Left posterior scalp, Dr Chandana Pena  04/18/2022    BCC Tip of Nose- Dr Sheets Plate SEP-FEM JUNC Right 8/17/2018    Procedure: LEG PERFORATED INJECTION SCLEROTHERAPY;  Surgeon: Baron Elvira MD;  Location: [facility];  Service: Vascular    REPLACEMENT TOTAL KNEE Right     TOTAL KNEE ARTHROPLASTY Left     VASCULAR SURGERY      VENA CAVA FILTER PLACEMENT      Interruption inferior vena cava, [place] filter, placement    WISDOM TOOTH EXTRACTION           11/07/22 1243   OT Last Visit   OT Visit Date 11/07/22   Note Type   Note type Evaluation   Additional Comments Nsg staff verbally cleared pt for OT evaluation  Pt received  supine in bed c HOB semi-elevated on this date reporting severe pain @ left  and right foot  + is agreeable to OT session @ this time  @ exit, pt remains in  seated EOB c all lines intact, all needs met, call bell in hand + nsg aware of location/disposition  (During evaluation, wife present in room )   Pain Assessment   Pain Assessment Tool 0-10   Pain Score 10 - Worst Possible Pain   Pain Location/Orientation Orientation: Left;Orientation: Right;Location: Leg   Pain Onset/Description Frequency: Intermittent; Descriptor: [facility] Pain Intervention(s) Medication (See MAR); Emotional support;Rest;Elevated   Restrictions/Precautions   Weight Bearing Precautions Per Order No   Other Precautions Fall Risk;Pain   Home Living   Type of [address] [place] [address] Two level;Performs ADLs on one level; Able to live on main level with bedroom/bathroom  (0 RADHA)   Bathroom Shower/Tub Walk-in shower   Bathroom Toilet Raised   Bathroom Equipment Shower chair   Bathroom Accessibility Accessible   Home Equipment Walker;Cane  ((SPC, laly RW, 3 wheeled rollator walker))   Prior Function   Level of [place] Independent with functional mobility; Independent with ADLs; Needs assistance with IADLS   Lives With Spouse; Family  ((daughter's family lives on 2nd floor of house))   Receives Help From Family   IADLs Independent with driving;Family/Friend/Other provides meals; Family/Friend/Other provides medication management   Falls in the last 6 months 0   Vocational Self employed  (())   Comments At baseline, pt ambulates w/ SPC for household distances and RW vs rollator walker in community  Lifestyle   Autonomy Pt lives in  2 story home c wife and family + @ baseline, performs ADLs  I'ly, IADLs with A + fxl mobility I'ly with RW  (+)   Pt is currently employed (full-time)  Reciprocal Relationships Family   Service to Others Vaishali Dill Enjoys job as    General   Additional Pertinent History Recent hospitalization 10/21/22   ADL   Eating Assistance 5  Supervision/Setup   Grooming Assistance 5  Supervision/Setup   UB Bathing Assistance 4  Minimal Assistance   LB [address] 2  Maximal Assistance   [address] 5  Supervision/Setup    [facility] 2  Maximal [address]  2  Maximal Assistance   Additional Comments Given fxl performance skills + medical complexity, therapist suspecting via clinical judgement + skilled analysis; pt currently requires stated assist above to perform each area of ADL d/t limitations including: pain, generalized muscle weakness, sitting/standing balance deficits, fxl activity tolerance limitations, difficulty performing bend/reach-anterior trunk flexion, requires external assist to complete transitional movements, decreased core strength, instability in stance and high fall risk  Bed Mobility   Supine to Sit 4  Minimal assistance   Additional items Assist x 1;HOB elevated;Verbal cues; Increased time required   Additional Comments DNT sit-sup; pt transitioned from sup-EOB     Transfers   Sit to Stand   (Attempted c various trials + modifications to environment - pt remains unsuccessful d/t severe pain reported @ BLE )   Balance   Static Sitting Fair +   Dynamic Sitting Fair   Activity Tolerance   Activity Tolerance Patient limited by pain   Medical Staff Made Aware PT/OT co-eval on this date d/t medical complexity, ambulatory dysfunction c high fall risk, various impeding lines + requirement for skilled interdisciplinary analysis of appropriate d/c recommendations  PT/OT POC/goals I'ly determined per respective discipline  Lora Plascencia)   Nurse Made Aware Medical staff made aware of current fxn, recommendations for d/c planning, fall risk + pt's location upon exit  Diamond June)   RUE Assessment   RUE Assessment WFL   LUE Assessment   LUE Assessment   (Reports pain c LUE during STS trials; WFL for attempts to perform extension to assist standing from bed)   Hand Function   Gross Motor Coordination Functional   Fine Motor Coordination Functional   Psychosocial   Psychosocial (WDL) WDL   Cognition   Overall Cognitive Status WFL   Arousal/Participation Alert; Responsive; Cooperative   Attention Within functional limits   Orientation Level Oriented X4   Memory Within functional limits   Following Commands Follows all commands and directions without difficulty   Assessment   Limitation Decreased ADL status; Decreased UE strength;Decreased endurance;Decreased high-level ADLs; Decreased self-care trans   Prognosis Fair   Assessment Patient is a 81 y o  male seen for OT evaluation s/p admit to  [address] [place] [address] on 11/4/2022 w/Acute on chronic cholecystitis + commorbidities/PMHx (as listed in medical record) affecting patient's functional performance c ADL tasks at time of assessment  OT orders placed for evaluation and treatment to assess pt's ADLs, cognitive status + performance during functional tasks in order to formulate appropriate d/c recommendations  Therapist performed at least two patient identifiers during session including name and wristband  Personal factors affecting patient at time of initial evaluation include: limited home support, advanced age, inability to perform current job functions, inability to perform IADLs, inability to perform ADLs, inability to ambulate household distances and inability to navigate community distances     Pt's clinical presentation is currently unstable/unpredictable given new onset deficits that effect pt's occupational performance and ability to safely return to PLOF including decrease activity tolerance, decrease standing balance, decrease sitting balance, decrease performance during ADL tasks, increased pain, decrease generalized strength, decrease activity engagement, decrease performance during functional transfers and high fall risk combined with medical complications of pain impacting overall mobility status, abnormal renal lab values, abnormal CBC, multiple readmissions and need for input for mobility technique/safety  This evaluation required an extensive review of medical and/or therapy records and additional review of physical, cognitive and psychosocial history related to functional performance  Based upon functional performance deficits and assessments, this evaluation has been identified as a  high complexity evaluation  Patient to benefit from continued Occupational Therapy treatment while in the hospital to address aforementioned deficits and maximize level of functional independence with ADLs and functional mobility  Pt currently demonstrates WFL ability to collaboratively engage in d/c planning, supportive family involved, demonstrates G awareness in d/c plan and is agreeable to d/c plan including transition to STR (as able)  From OT standpoint, recommendation at time of d/c would be Short Term Rehab  Plan   Treatment Interventions ADL retraining;Functional transfer training;UE strengthening/ROM; Endurance training;Equipment evaluation/education; Compensatory technique education; Energy conservation; Activityengagement   Goal Expiration Date 11/17/22   OT Treatment Day 0   OT Frequency 3-5x/wk   Recommendation   OT Discharge Recommendation Post acute rehabilitation services   AM-PAC Daily Activity Inpatient   Lower Body Dressing 2   Bathing 2   Toileting 2   Upper Body Dressing 3   Grooming 3 Eating 3   Daily Activity Raw Score 15   Daily Activity Standardized Score (Calc for Raw Score >=11) 34 69   AM-[facility] Applied Cognition Inpatient   Following a Speech/Presentation 4   Understanding Ordinary Conversation 4   Taking Medications 4   Remembering Where Things Are Placed or Put Away 4   Remembering List of 4-5 Errands 4   Taking Care of Complicated Tasks 4   Applied Cognition Raw Score 24   Applied Cognition Standardized Score 62 21   Barthel Index   Feeding 10   Bathing 0   Grooming Score 5   Dressing Score 5   Bladder Score 5   Bowels Score 5   Toilet Use Score 0   Transfers (Bed/Chair) Score 0   Mobility (Level Surface) Score 0   Stairs Score 0   Barthel Index Score 30   The patient's raw score on the AM-PAC Daily Activity inpatient short form is 15, standardized score is 34 69, less than 39 4  Patients at this level are likely to benefit from DC to post-acute rehabilitation services  Please refer to the recommendation of the Occupational Therapist for safe DC planning  GOALS:    *ADL transfers with Mod (A) for inc'd independence with ADLs/purposeful tasks    *UB ADL with (I) for inc'd independence with self cares    *LB ADL with Min (A) using AE prn for inc'd independence with self cares    *Toileting with Min (A) for clothing management and hygiene for return to PLOF with personal care    *Increase stand tolerance x 30s  m for inc'd tolerance with standing purposeful tasks    *Participate in 10m UE therex to increase overall stamina/activity tolerance for purposeful tasks    *Bed mobility- (I) for inc'd independence to manage own comfort and initiate EOB & OOB purposeful tasks    *Patient will verbalize 3 safety awareness/ principles to prevent falls in the home setting  *Patient will verbalize and demonstrate use of energy conservation/deep breathing techniques and work simplification skills during functional activities with no verbal cues       *Patient will increase OOB/sitting tolerance to 2-4 hours per day to increase participation in self-care and leisure tasks with no s/s of exertion  *Patient will engage in ongoing cognitive assessment to assist with safe discharge planning/recommendations       Alyssa García OTR/L

## 2022-11-07 NOTE — PROGRESS NOTES
General Surgery  Progress Note   Alfred Holm 81 y o  male MRN: [id number]  Unit/Bed#: S -01 Encounter: [id number]    Assessment:  69-year-old male who presented with acute encephalopathy, now resolved  History of chronic cholecystitis status post percutaneous cholecystostomy tube placement initially on , exchanged on 10/19 by IR during patient admission from 10/18-10/24, exchanged again on 10/27 by IR  Diastolic congestive heart failure  Stage III CKD  History of AFib, on warfarin-currently supratherapeutic INR (Warfarin held)    Vital signs stable, afebrile over the last 24 hours  Abdomen remains soft, nontender  Perc jeny drain with purulent output  Plan:  · NPO for IR tube check today  • F/u LE/UE duplexes   • Continue to hold warfarin - INR today 5 90  • IV Rocephin/Flagyl per slim  • Monitor perc jeny tube output   o IR tube check today  o GI consult: No current role for MR or ERCP  • Care per primary team    Subjective/Objective     Subjective: Patient seen and examined at bedside, in no acute distress  No acute events overnight  Objective:     Vitals:Blood pressure 116/54, pulse 70, temperature 99 °F (37 2 °C), resp  rate 18, height 6' 1" (1 854 m), weight 133 kg (294 lb 1 5 oz), SpO2 94 %  ,Body mass index is 38 8 kg/m²  Temp (24hrs), Av 5 °F (37 5 °C), Min:99 °F (37 2 °C), Max:100 3 °F (37 9 °C)  Current: Temperature: 99 °F (37 2 °C)      Intake/Output Summary (Last 24 hours) at 2022  Last data filed at 2022 1753  Gross per 24 hour   Intake 380 ml   Output 450 ml   Net -70 ml       Invasive Devices  Report    Peripheral Intravenous Line  Duration           Peripheral IV 22 Right Antecubital 2 days                Physical Exam:  General: No acute distress  CV: Well perfused, regular rate and rhythm  Lungs: Normal work of breathing, no increased respiratory effort on room air  Abdomen: Soft, non-tender, non-distended    Right perc jeny tube in place with purulent output in bag  Extremities:  Bilateral lower extremity edema with venous stasis skin changes, improved edema     Skin: Warm, dry    Lab Results:   BMP/CMP:   Lab Results   Component Value Date    SODIUM 137 11/07/2022    K 3 7 11/07/2022     11/07/2022    CO2 26 11/07/2022    BUN 47 (H) 11/07/2022    CREATININE 2 24 (H) 11/07/2022    CALCIUM 8 2 (L) 11/07/2022    AST 36 11/07/2022    ALT 28 11/07/2022    ALKPHOS 97 11/07/2022    EGFR 25 11/07/2022    and CBC:   Lab Results   Component Value Date    WBC 9 05 11/07/2022    HGB 8 3 (L) 11/07/2022    HCT 27 3 (L) 11/07/2022    MCV 89 11/07/2022     11/07/2022    MCH 26 9 11/07/2022    MCHC 30 4 (L) 11/07/2022    RDW 18 5 (H) 11/07/2022    MPV 10 4 11/07/2022     VTE Prophylaxis: Sequential compression device (Venodyne)     Mad[facility]  11/6/2022

## 2022-11-07 NOTE — ASSESSMENT & PLAN NOTE
· Hold warfarin, INR still supratherapeutic  ·  vitamin K administered on 11/07/2020 2:00 a m    · Continue sotalol

## 2022-11-07 NOTE — BRIEF OP NOTE (RAD/CATH)
INTERVENTIONAL RADIOLOGY PROCEDURE NOTE    Date: 11/7/2022    Procedure: Procedure name not found  Preoperative diagnosis:   1  Weakness    2  Altered mental status    3  Cholecystitis    4  Cholecystostomy tube dysfunction    5  RUQ pain    6  Cholangitis    7  Dysphagia    8  Acute on chronic cholecystitis         Postoperative diagnosis: Same  Surgeon: Ignacio Villafana     Assistant: None  No qualified resident was available  Blood loss: None    Specimens: None     Findings: Cholecystostomy tube fairly clogged with sediment, likely related to ongoing slow breakdown of his numerous gallstones  With much manipulation, was able to clear a moderate amount of sediment with a guidewire, thereby allowing at least some drainage of the gallbladder  Keep tube to bag drainage, with bid flushing  Monitor INR and we can attempt exchange for new tube, possible upsized to 12F, when INR less than 1 5  Would refer patient to be evaluated as outpatient by Dr Alma Patiño in his IR clinic to determine feasibility of percutaneous GB stone clearance  Complications: None immediate      Anesthesia: none

## 2022-11-07 NOTE — SEDATION DOCUMENTATION
Tube check for clogged jeny tube  Tube clogged with thick sediment  Tube now functioning  Please refer to Dr Miesha Clinton dictation for more information

## 2022-11-07 NOTE — ASSESSMENT & PLAN NOTE
[facility]  Nephrology  Progress Note    Patient Name: Eufemia Haq  MRN: [id number]  Admission Date: 8/30/2017  Hospital Length of Stay: 1 days  Attending Provider: Maria Esther Joseph MD   Primary Care Physician: Alecia Delacruz MD  Principal Problem:<principal problem not specified>    Subjective:     HPI: Ms. Haq is a 52 yo AAF with HTN, T1DM, and ESRD admitted with DKA. Patient was recently admitted 8/26/17-8/28/17 for similar complaint. Nephrology consulted for ESRD management. She normally receives iHD TTS via RIJ TDC at a [facility] in [place], [place]. Treatment duration 4 hours; EDW 65kg. She continues to make some urine. She received dialysis inpatient on Saturday night for acidosis. Per report she received iHD at her temporary unit yesterday. Patient was somnolent on exam and unable to provide any history. Labs with glucose 1241, +beta-hydrobutyrate, hyperkalemia, and metabolic acidosis. Corrected Na 134. She is currently on an insulin infusion and being admitted to ICU for further care. Emergency consent was obtained for dialysis.     Interval History:   Received HD last night for ~3 hours; UF 1.7L. Doing well this morning. Off insulin. Eating breakfast. Upset because she can't see. Tells me her home [place] is [place]. Feels better today; no SOB. 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   Family also upset about the amount of edema the patient has accumulated.     Review of patient's allergies indicates:   Allergen Reactions    Ciprofloxacin (bulk) Itching    Latex Itching and Other (See Comments)     Very low Oxygen    Vancomycin Shortness Of Breath and Itching    Neurontin [gabapentin] Other (See Comments)     Seizure like activity    Niacin Itching and  Wt Readings from Last 3 Encounters:   10/25/22 (!) 137 kg (302 lb)   10/24/22 (!) 138 kg (305 lb 3 2 oz)   09/26/22 (!) 139 kg (307 lb)     ·  not currently diuresing    · Reassess volume status daily with daily weight and I/O Other (See Comments)     Burning      Bactrim [sulfamethoxazole-trimethoprim] Rash     Current Facility-Administered Medications   Medication Frequency    0.9%  NaCl infusion (for blood administration) Q24H PRN    0.9%  NaCl infusion PRN    dextrose 50% injection 12.5 g PRN    dextrose 50% injection 25 g PRN    enoxaparin injection 30 mg Daily    glucagon (human recombinant) injection 1 mg PRN    glucose chewable tablet 16 g PRN    glucose chewable tablet 24 g PRN    heparin (porcine) injection 2,000 Units PRN    insulin aspart pen 0-5 Units QID (AC + HS) PRN    insulin aspart pen 5 Units TIDWM    insulin detemir pen 10 Units Daily    losartan-hydrochlorothiazide 50-12.5 mg per tablet 1 tablet Daily    metoprolol tartrate (LOPRESSOR) tablet 50 mg BID    sodium chloride 0.9% flush 3 mL Q8H       Objective:     Vital Signs (Most Recent):  Temp: 98.5 °F (36.9 °C) (08/31/17 1100)  Pulse: 82 (08/31/17 1200)  Resp: 14 (08/31/17 1200)  BP: 129/61 (08/31/17 1200)  SpO2: 99 % (08/31/17 1200)  O2 Device (Oxygen Therapy): room air (08/31/17 1200) Vital Signs (24h Range):  Temp:  [97.3 °F (36.3 °C)-98.5 °F (36.9 °C)] 98.5 °F (36.9 °C)  Pulse:  [68-89] 82  Resp:  [9-20] 14  SpO2:  [93 %-100 %] 99 %  BP: (102-191)/(53-72) 129/61     Weight: 80 kg (176 lb 5.9 oz) (08/31/17 0800)  Body mass index is 28.47 kg/m².  Body surface area is 1.93 meters squared.    I/O last 3 completed shifts:  In: 812 [I.V.:312; Other:500]  Out: 2280 [Other:2280]    Physical Exam   Constitutional: She appears well-developed and well-nourished. No distress.   HENT:   Head: Normocephalic and atraumatic.   Cardiovascular: Normal rate and regular rhythm.    Pulmonary/Chest: Effort normal and breath sounds normal.   Abdominal: Soft. She exhibits no distension.   Musculoskeletal: She exhibits edema. She exhibits no deformity.   Skin: Skin is warm and dry.       Significant Labs:  CBC:   Recent Labs  Lab 08/31/17  0424   WBC 7.49   RBC 2.51*    HGB 6.8*   HCT 20.0*      MCV 80*   MCH 27.1   MCHC 34.0     CMP:   Recent Labs  Lab 08/31/17  0624      CALCIUM 7.5*   ALBUMIN 2.0*   PROT 5.3*   *   K 5.0   CO2 26   CL 94*   BUN 57*   CREATININE 4.1*   ALKPHOS 167*   ALT 21   AST 37   BILITOT 0.1       Recent Labs  Lab 08/30/17  1534   COLORU Yellow   SPECGRAV 1.015   PHUR 5.0   PROTEINUA 2+*   BACTERIA Rare   NITRITE Negative   LEUKOCYTESUR Negative   UROBILINOGEN Negative   HYALINECASTS 0     All labs within the past 24 hours have been reviewed.     Significant Imaging:  Labs: Reviewed    Assessment/Plan:     ESRD (end stage renal disease)    - receives iHD TTS via RI TDC at [facility] in [place], [place]. Temporary unit is [facility]. Treatment duration 4 hours; EDW 65kg. Minimal residual renal function.  - performed iHD last night to help correct electrolyte abnormalities related to DKA  - DKA now resolved  - family upset about worsening peripheral edema  - will perform HD again today. UF goal 3-4L as tolerated. This will also help patient to get back on her home dialysis schedule  - next HD planned for Saturday if patient remains inpatient        Anemia    - hgb below goal  - blood transfusion per primary team  - will dose ANGELICA with HD treatments            Ana Egan, PGY-5  Nephrology Fellow  [facility]  Pager: [phone]    I have reviewed and concur with the fellow's history, physical, assessment, and plan. I have personally interviewed and examined the patient at bedside. See below addendum for my evaluation and additional findings.-[phone]

## 2022-11-08 LAB
ALBUMIN SERPL BCP-MCNC: 2.9 G/DL (ref 3.5–5)
ALP SERPL-CCNC: 95 U/L (ref 34–104)
ALT SERPL W P-5'-P-CCNC: 23 U/L (ref 7–52)
ANION GAP SERPL CALCULATED.3IONS-SCNC: 10 MMOL/L (ref 4–13)
APTT PPP: 57 SECONDS (ref 23–37)
APTT PPP: 65 SECONDS (ref 23–37)
AST SERPL W P-5'-P-CCNC: 33 U/L (ref 13–39)
BILIRUB SERPL-MCNC: 0.89 MG/DL (ref 0.2–1)
BUN SERPL-MCNC: 50 MG/DL (ref 5–25)
CALCIUM ALBUM COR SERPL-MCNC: 9.6 MG/DL (ref 8.3–10.1)
CALCIUM SERPL-MCNC: 8.7 MG/DL (ref 8.4–10.2)
CHLORIDE SERPL-SCNC: 102 MMOL/L (ref 96–108)
CO2 SERPL-SCNC: 26 MMOL/L (ref 21–32)
CREAT SERPL-MCNC: 2.13 MG/DL (ref 0.6–1.3)
ERYTHROCYTE [DISTWIDTH] IN BLOOD BY AUTOMATED COUNT: 18.2 % (ref 11.6–15.1)
GFR SERPL CREATININE-BSD FRML MDRD: 27 ML/MIN/1.73SQ M
GLUCOSE SERPL-MCNC: 106 MG/DL (ref 65–140)
GLUCOSE SERPL-MCNC: 107 MG/DL (ref 65–140)
GLUCOSE SERPL-MCNC: 130 MG/DL (ref 65–140)
GLUCOSE SERPL-MCNC: 158 MG/DL (ref 65–140)
GLUCOSE SERPL-MCNC: 159 MG/DL (ref 65–140)
HCT VFR BLD AUTO: 26.5 % (ref 36.5–49.3)
HGB BLD-MCNC: 8.2 G/DL (ref 12–17)
INR PPP: 2.61 (ref 0.84–1.19)
INR PPP: 3.59 (ref 0.84–1.19)
MCH RBC QN AUTO: 27.1 PG (ref 26.8–34.3)
MCHC RBC AUTO-ENTMCNC: 30.9 G/DL (ref 31.4–37.4)
MCV RBC AUTO: 88 FL (ref 82–98)
PLATELET # BLD AUTO: 326 THOUSANDS/UL (ref 149–390)
PMV BLD AUTO: 9.4 FL (ref 8.9–12.7)
POTASSIUM SERPL-SCNC: 3.6 MMOL/L (ref 3.5–5.3)
PROT SERPL-MCNC: 6.7 G/DL (ref 6.4–8.4)
PROTHROMBIN TIME: 28.2 SECONDS (ref 11.6–14.5)
PROTHROMBIN TIME: 36.1 SECONDS (ref 11.6–14.5)
RBC # BLD AUTO: 3.03 MILLION/UL (ref 3.88–5.62)
SODIUM SERPL-SCNC: 138 MMOL/L (ref 135–147)
WBC # BLD AUTO: 6.98 THOUSAND/UL (ref 4.31–10.16)

## 2022-11-08 PROCEDURE — 85027 COMPLETE CBC AUTOMATED: CPT | Performed by: INTERNAL MEDICINE

## 2022-11-08 PROCEDURE — 85610 PROTHROMBIN TIME: CPT | Performed by: INTERNAL MEDICINE

## 2022-11-08 PROCEDURE — 85730 THROMBOPLASTIN TIME PARTIAL: CPT | Performed by: INTERNAL MEDICINE

## 2022-11-08 PROCEDURE — 97110 THERAPEUTIC EXERCISES: CPT

## 2022-11-08 PROCEDURE — 80053 COMPREHEN METABOLIC PANEL: CPT | Performed by: INTERNAL MEDICINE

## 2022-11-08 PROCEDURE — 99232 SBSQ HOSP IP/OBS MODERATE 35: CPT | Performed by: INTERNAL MEDICINE

## 2022-11-08 PROCEDURE — 82948 REAGENT STRIP/BLOOD GLUCOSE: CPT

## 2022-11-08 PROCEDURE — 97530 THERAPEUTIC ACTIVITIES: CPT

## 2022-11-08 PROCEDURE — 99232 SBSQ HOSP IP/OBS MODERATE 35: CPT | Performed by: SURGERY

## 2022-11-08 PROCEDURE — 97116 GAIT TRAINING THERAPY: CPT

## 2022-11-08 RX ORDER — PHYTONADIONE 10 MG/ML
5 INJECTION, EMULSION INTRAMUSCULAR; INTRAVENOUS; SUBCUTANEOUS ONCE
Status: COMPLETED | OUTPATIENT
Start: 2022-11-08 | End: 2022-11-08

## 2022-11-08 RX ORDER — POTASSIUM CHLORIDE 20 MEQ/1
40 TABLET, EXTENDED RELEASE ORAL ONCE
Status: COMPLETED | OUTPATIENT
Start: 2022-11-08 | End: 2022-11-08

## 2022-11-08 RX ORDER — AMOXICILLIN 250 MG
1 CAPSULE ORAL 2 TIMES DAILY
Status: DISCONTINUED | OUTPATIENT
Start: 2022-11-08 | End: 2022-11-12

## 2022-11-08 RX ORDER — HEPARIN SODIUM 1000 [USP'U]/ML
2000 INJECTION, SOLUTION INTRAVENOUS; SUBCUTANEOUS
Status: DISCONTINUED | OUTPATIENT
Start: 2022-11-08 | End: 2022-11-10

## 2022-11-08 RX ORDER — HEPARIN SODIUM 1000 [USP'U]/ML
4000 INJECTION, SOLUTION INTRAVENOUS; SUBCUTANEOUS
Status: DISCONTINUED | OUTPATIENT
Start: 2022-11-08 | End: 2022-11-10

## 2022-11-08 RX ORDER — HEPARIN SODIUM 10000 [USP'U]/100ML
3-20 INJECTION, SOLUTION INTRAVENOUS
Status: DISPENSED | OUTPATIENT
Start: 2022-11-08 | End: 2022-11-10

## 2022-11-08 RX ADMIN — OXYCODONE HYDROCHLORIDE 5 MG: 5 TABLET ORAL at 05:59

## 2022-11-08 RX ADMIN — METRONIDAZOLE 500 MG: 5 INJECTION, SOLUTION INTRAVENOUS at 20:39

## 2022-11-08 RX ADMIN — POTASSIUM CHLORIDE 40 MEQ: 1500 TABLET, EXTENDED RELEASE ORAL at 06:28

## 2022-11-08 RX ADMIN — ACETAMINOPHEN 650 MG: 325 TABLET, FILM COATED ORAL at 05:54

## 2022-11-08 RX ADMIN — SODIUM CHLORIDE 5 ML: 9 INJECTION INTRAMUSCULAR; INTRAVENOUS; SUBCUTANEOUS at 08:20

## 2022-11-08 RX ADMIN — GABAPENTIN 100 MG: 100 CAPSULE ORAL at 17:08

## 2022-11-08 RX ADMIN — METRONIDAZOLE 500 MG: 5 INJECTION, SOLUTION INTRAVENOUS at 03:34

## 2022-11-08 RX ADMIN — ACETAMINOPHEN 650 MG: 325 TABLET, FILM COATED ORAL at 17:08

## 2022-11-08 RX ADMIN — PHYTONADIONE 5 MG: 10 INJECTION, EMULSION INTRAMUSCULAR; INTRAVENOUS; SUBCUTANEOUS at 10:58

## 2022-11-08 RX ADMIN — GABAPENTIN 100 MG: 100 CAPSULE ORAL at 20:39

## 2022-11-08 RX ADMIN — PANTOPRAZOLE SODIUM 40 MG: 40 TABLET, DELAYED RELEASE ORAL at 05:54

## 2022-11-08 RX ADMIN — ACETAMINOPHEN 650 MG: 325 TABLET, FILM COATED ORAL at 10:58

## 2022-11-08 RX ADMIN — SENNOSIDES AND DOCUSATE SODIUM 1 TABLET: 8.6; 5 TABLET ORAL at 17:08

## 2022-11-08 RX ADMIN — GABAPENTIN 100 MG: 100 CAPSULE ORAL at 08:20

## 2022-11-08 RX ADMIN — METRONIDAZOLE 500 MG: 5 INJECTION, SOLUTION INTRAVENOUS at 10:57

## 2022-11-08 RX ADMIN — CEFTRIAXONE 2000 MG: 2 INJECTION, SOLUTION INTRAVENOUS at 19:18

## 2022-11-08 RX ADMIN — INSULIN LISPRO 2 UNITS: 100 INJECTION, SOLUTION INTRAVENOUS; SUBCUTANEOUS at 11:52

## 2022-11-08 RX ADMIN — HEPARIN SODIUM 11.1 UNITS/KG/HR: 10000 INJECTION, SOLUTION INTRAVENOUS at 14:42

## 2022-11-08 NOTE — ASSESSMENT & PLAN NOTE
· Patient has a history of acute cholecystitis s/p percutaneous cholecystostomy tube  · Now presenting with LFT elevation and right upper quadrant pain  · CT abdomen/pelvis: No acute findings in the chest abdomen or pelvis Transhepatic percutaneous cholecystostomy tube remains in place  Persistent gallbladder inflammatory changes  · INR 3 59 --> 2 61 this a m  to p m  Plan:  · Surgery consulted, suggested IR evaluation  · IR consult planning for tube check, discussed and they intend to do to change once INR drops to approximately 1 5  · Vitamin K given on 11/08/2022  · GI was consulted regarding the possibility of acute cholangitis  No plan for ERCP or MRCP at this time  · Continue IV antibiotics with ceftriaxone and flagyl  Patient had had symptomatic improvement    · Continue to monitor CMP

## 2022-11-08 NOTE — ASSESSMENT & PLAN NOTE
Wt Readings from Last 3 Encounters:   10/25/22 (!) 137 kg (302 lb)   10/24/22 (!) 138 kg (305 lb 3 2 oz)   09/26/22 (!) 139 kg (307 lb)     ·  not currently diuresing    · Reassess volume status daily with daily weight and I/O

## 2022-11-08 NOTE — PHYSICAL THERAPY NOTE
PHYSICAL THERAPY NOTE          Patient Name: Neema Deleon  [id number] Date: 11/8/2022 11/08/22 1511   PT Last Visit   PT Visit Date 11/08/22   Note Type   Note Type Treatment   Pain Assessment   Pain Assessment Tool 0-10   Pain Score No Pain  (0/10 pre tx session, 4/10 post tx session)   Pain Location/Orientation Orientation: Left; Location: Leg   Pain Onset/Description Descriptor: Sharp   Effect of Pain on Daily Activities limited standing tolerance and functional mobility   Patient's Stated Pain Goal No pain   Hospital Pain Intervention(s) Repositioned; Ambulation/increased activity; Emotional support; Rest   Multiple Pain Sites No   Restrictions/Precautions   Weight Bearing Precautions Per Order No   Other Precautions Chair Alarm; Bed Alarm; Fall Risk;Pain   General   Chart Reviewed Yes   Response to Previous Treatment Patient with no complaints from previous session  Family/Caregiver Present Yes  (wife Ese Ramachandran)   Cognition   Overall Cognitive Status WFL   Arousal/Participation Alert; Responsive; Cooperative   Attention Within functional limits   Orientation Level Oriented X4   Memory Within functional limits   Following Commands Follows all commands and directions without difficulty   Comments pt pleasant and cooperative throughout tx session  Subjective   Subjective pt was agreeable to participate in PT intervention   Bed Mobility   Rolling R Unable to assess   Rolling L Unable to assess   Supine to Sit Unable to assess   Sit to Supine Unable to assess   Additional Comments pt seated EOB Upon arrival to pt room pre tx session   Transfers   Sit to Stand 2  Maximal assistance   Additional items Assist x 1; Increased time required;Verbal cues   Stand to Sit 2  Maximal assistance   Additional items Assist x 1; Increased time required;Verbal cues   Stand pivot 2  Maximal assistance   Additional items Assist x 1; Increased time required;Verbal cues;Armrests   Additional Comments pt required bariatric RW in order to complete all functional transfers in todays tx session with VC's for hand placement in order to increase safety and balance   Ambulation/Elevation   Gait pattern Decreased foot clearance; Short stride; Excessively slow; Antalgic; Wide ROWENA   Gait Assistance 3  Moderate assist   Additional items Assist x 1;Verbal cues; Tactile cues   Assistive Device Bariatric Rolling walker   Distance 2'fwd and 2' back BRW   Ambulation/Elevation Additional Comments additonal ambulation ot possible due to increase pain in L Leg  pt required a therapeutic seated rest break after ambulation   Balance   Static Sitting Fair +   Dynamic Sitting Fair   Static Standing Poor +   Ambulatory Poor   Endurance Deficit   Endurance Deficit Yes   Endurance Deficit Description limited functional mobility and ambulation distance   Activity Tolerance   Activity Tolerance Patient limited by pain   Nurse Made Aware Spoke to RN   Exercises   Hip Abduction Sitting;10 reps;AROM; Bilateral   Hip Adduction Sitting;10 reps;AROM; Bilateral  (pillow squeezes)   Knee AROM Long Arc Quad Sitting;15 reps;AROM; Bilateral   Ankle Pumps Sitting;20 reps;AROM; Bilateral   Marching Sitting;10 reps;AROM; Bilateral   Assessment   Problem List Decreased strength;Decreased endurance; Impaired balance;Decreased mobility;Pain; Impaired sensation;Obesity   Assessment pt began tx session seated EOB and was agreeable to participate in PT intervention  pt and care giver were educated on use of consistant cues for hand placement in orderto increase safety and balance while completing functional transfers  pt continues to require max ax1 for all functional transfers to and from RW  pt was able to increase static standing with RW as pt was able to stand 3 minutes w/o LOB before requesting a seated rest break at EOB   pt was able to perform 2 STS and 1 SPT in todays tx session all with max Ax1 with VC's inn order to strengthen LE's and increase endurance and safety while performing functional transfers  pt was only able to ambulate 2' fwd and 2'back w/ BRW and mod Ax1 for safety and balance  pt was able to participate in TE activities while seated in recliner with good form and no increases in pain  pt continues to be functioning below his base line level and remains limited with functional mobility, ambulation distance and standing tolerance  Continue to recommend post acute rehab services at the time of D/C in order to [facility] pt functional independence and safety with all OOB mobility  post tx pt in recliner with call bell and all pt needs met   Goals   Patient Goals to be able to walk better   STG Expiration Date 11/17/22   PT Treatment Day 1   Plan   Treatment/Interventions Functional transfer training;LE strengthening/ROM; Therapeutic exercise; Endurance training;Patient/family training;Equipment eval/education; Bed mobility;Gait training;Spoke to nursing   Progress Slow progress, decreased activity tolerance   PT Frequency 3-5x/wk   Recommendation   PT Discharge Recommendation Post acute rehabilitation services   Equipment Recommended [address] Mobility Inpatient   Turning in Bed Without Bedrails 3   Lying on Back to Sitting on Edge of Flat Bed 3   Moving Bed to Chair 2   Standing Up From Chair 2   Walk in Room 2   Climb 3-5 Stairs 1   Basic Mobility Inpatient Raw Score 13   Basic Mobility Standardized Score 33 99   Turning Head Towards Sound 4   Follow Simple Instructions 4   Low Function Basic Mobility Raw Score 21   Low Function Basic Mobility Standardized Score 34 39   Highest Level Of Mobility   JH-HLM Goal 4: Move to chair/commode   JH-HLM Achieved 5: Stand (1 or more minutes)   Education   Education Provided Mobility training;Assistive device  (functional transfers with BRW)   Patient Demonstrates acceptance/verbal understanding   End of Consult   Patient Position at End of Consult Bedside chair;Bed/Chair alarm activated; All needs within reach   The patient's AM-PAC Basic Mobility Inpatient Short Form Raw Score is 13  A Raw score of less than or equal to 16 suggests the patient may benefit from discharge to post-acute rehabilitation services  Please also refer to the recommendation of the Physical Therapist for safe discharge planning       Ron Razo

## 2022-11-08 NOTE — ASSESSMENT & PLAN NOTE
· Consider MRI if patient's pain better controlled or increased concern for bone infection  · He did not complain of back pain on 11/05/2022 - 11/08/2022

## 2022-11-08 NOTE — PROGRESS NOTES
Progress Note - General Surgery  Jaime Gipson 81 y o  male MRN: [id number]  Unit/Bed#: S -01 Encounter: [id number]      Assessment:  81 y o  male who presented with acute encephalopathy, now resolved  History of chronic cholecystitis status post percutaneous cholecystostomy tube placement initially on 09/01, exchanged on 10/19 by IR during patient admission from 10/18-10/24, exchanged again on 10/27 by IR  S/p IR tube check and clearance on 11/7  Diastolic congestive heart failure  Stage III CKD  History of AFib, on warfarin-currently supratherapeutic INR (Warfarin held)     INR 3 6 from 5 9, WBC 7 from 9, Cr 2 from 2 2    Plan:  - Diet as tolerated  - INR still elevated at 3 6, per IR, once less than 1 5 can go for drain upsizing  - Continue drain flushes per IR   - Will need outpatient IR follow up for possible IR percutaneous gallstone removal   - Remainder of care per primary team  - Please reach out with any questions or concerns  Subjective: No acute events overnight  Afebrile, hemodynamically stable  Tolerating diet  No nausea, or vomiting, fevers or chills  Tolerated procedure well  Objective:   Temp:  [97 8 °F (36 6 °C)-99 2 °F (37 3 °C)] 97 8 °F (36 6 °C)  HR:  [67-82] 67  Resp:  [16-20] 20  BP: ()/(55-65) 99/55    Physical Exam:  General: No acute distress, alert and oriented  CV: Well perfused, regular rate and rhythm  Lungs: Normal work of breathing, no increased respiratory effort  Abdomen: Soft, non-tender, non-distended  IR drain in place with minimal output  Extremities: No edema, clubbing or cyanosis  Skin: Warm, dry      I/O       11/06 0701 11/07 0700 11/07 0701 11/08 0700 11/08 0701 11/09 0700    P  O  380 480     I V  (mL/kg)  5 (0)     NG/GT  0     Total Intake(mL/kg) 380 (2 9) 485 (3 6)     Urine (mL/kg/hr) 0 (0) 100 (0)     Emesis/NG output 10 5     Total Output 10 105     Net +370 +380            Unmeasured Urine Occurrence 1 x            Lab, Imaging and other studies: I have personally reviewed pertinent reports    , CBC with diff:   Lab Results   Component Value Date    WBC 6 98 11/08/2022    HGB 8 2 (L) 11/08/2022    HCT 26 5 (L) 11/08/2022    MCV 88 11/08/2022     11/08/2022    MCH 27 1 11/08/2022    MCHC 30 9 (L) 11/08/2022    RDW 18 2 (H) 11/08/2022    MPV 9 4 11/08/2022   , BMP/CMP:   Lab Results   Component Value Date    SODIUM 138 11/08/2022    K 3 6 11/08/2022     11/08/2022    CO2 26 11/08/2022    BUN 50 (H) 11/08/2022    CREATININE 2 13 (H) 11/08/2022    CALCIUM 8 7 11/08/2022    AST 33 11/08/2022    ALT 23 11/08/2022    ALKPHOS 95 11/08/2022    EGFR 27 11/08/2022         Ashmayra Cherry  11/8/2022 8:24 AM

## 2022-11-08 NOTE — PLAN OF CARE
Problem: PHYSICAL THERAPY ADULT  Goal: Performs mobility at highest level of function for planned discharge setting  See evaluation for individualized goals  Description: Treatment/Interventions: Functional transfer training, LE strengthening/ROM, Therapeutic exercise, Endurance training, Patient/family training, Equipment eval/education, Bed mobility, Gait training  Equipment Recommended: Alyssa Aguilar       See flowsheet documentation for full assessment, interventions and recommendations  Outcome: Progressing  Note:    Problem List: Decreased strength, Decreased endurance, Impaired balance, Decreased mobility, Pain, Impaired sensation, Obesity  Assessment: pt began tx session seated EOB and was agreeable to participate in PT intervention  pt and care giver were educated on use of consistant cues for hand placement in orderto increase safety and balance while completing functional transfers  pt continues to require max ax1 for all functional transfers to and from RW  pt was able to increase static standing with RW as pt was able to stand 3 minutes w/o LOB before requesting a seated rest break at EOB  pt was able to perform 2 STS and 1 SPT in todays tx session all with max Ax1 with VC's inn order to strengthen LE's and increase endurance and safety while performing functional transfers  pt was only able to ambulate 2' fwd and 2'back w/ BRW and mod Ax1 for safety and balance  pt was able to participate in TE activities while seated in recliner with good form and no increases in pain  pt continues to be functioning below his base line level and remains limited with functional mobility, ambulation distance and standing tolerance  Continue to recommend post acute rehab services at the time of D/C in order to [facility] pt functional independence and safety with all OOB mobility   post tx pt in recliner with call bell and all pt needs met        PT Discharge Recommendation: Post acute rehabilitation services    See flowsheet documentation for full assessment

## 2022-11-08 NOTE — PROGRESS NOTES
[facility]  Progress Note - Sonam Blackmon 1935, 81 y o  male MRN: [id number]  Unit/Bed#: S -01 Encounter: [id number]  Primary Care Provider: Margaret Arias DO   Date and time admitted to hospital: 11/4/2022  2:38 PM    * Acute on chronic cholecystitis  Assessment & Plan  · Patient has a history of acute cholecystitis s/p percutaneous cholecystostomy tube  · Now presenting with LFT elevation and right upper quadrant pain  · CT abdomen/pelvis: No acute findings in the chest abdomen or pelvis Transhepatic percutaneous cholecystostomy tube remains in place  Persistent gallbladder inflammatory changes  · INR 3 59 --> 2 61 this a m  to p m  Plan:  · Surgery consulted, suggested IR evaluation  · IR consult planning for tube check, discussed and they intend to do to change once INR drops to approximately 1 5  · Vitamin K given on 11/08/2022  · GI was consulted regarding the possibility of acute cholangitis  No plan for ERCP or MRCP at this time  · Continue IV antibiotics with ceftriaxone and flagyl  Patient had had symptomatic improvement  · Continue to monitor CMP      Leg pain  Assessment & Plan   The patient has been complaining of bilateral leg pain, likely secondary to his edema   Versus neuropathic pain  He describes it as a shooting pain, and that is exacerbated when he moves legs  -  Increase gabapentin to t i d  100 mg  Diastolic congestive heart failure [facility])  Assessment & Plan  Wt Readings from Last 3 Encounters:   10/25/22 (!) 137 kg (302 lb)   10/24/22 (!) 138 kg (305 lb 3 2 oz)   09/26/22 (!) 139 kg (307 lb)     ·  not currently diuresing    · Reassess volume status daily with daily weight and I/O         Stage 3a chronic kidney disease with elevated creatinine  Assessment & Plan  Lab Results   Component Value Date    EGFR 27 11/08/2022    EGFR 25 11/07/2022    EGFR 29 11/06/2022    CREATININE 2 13 (H) 11/08/2022    CREATININE 2 24 (H) 11/07/2022 CREATININE 2 00 (H) 2022   · Following BMP    Paroxysmal atrial fibrillation (HCC)  Assessment & Plan  · Hold warfarin, INR therapeutic, continuing to reduce for IR procedure  · Continue sotalol    Thoracic back pain  Assessment & Plan  · Consider MRI if patient's pain better controlled or increased concern for bone infection  · He did not complain of back pain on 2022 - 2022    OBINNA (obstructive sleep apnea)  Assessment & Plan  · Continue cpap    Thrombophlebitis  Assessment & Plan  · S/p IVC filter   · Warfarin held as above  · Patient ordered for Doppler of lower extremities for lower extremity pain  ·   Unrevealing for lower extremities and right  Upper extremity, demonstrated left upper extremity thrombophlebitis  ·   Ordered warm compresses for thrombophlebitis  VTE Pharmacologic Prophylaxis: VTE Score: 3 Moderate Risk (Score 3-4) - Pharmacological DVT Prophylaxis Contraindicated  Sequential Compression Devices Ordered  Patient Centered Rounds: I performed bedside rounds with nursing staff today  Discussions with Specialists or Other Care Team Provider:  IR    Education and Discussions with Family / Patient: Updated  (wife) at bedside  Current Length of Stay: 4 day(s)  Current Patient Status: Inpatient   Discharge Plan: Anticipate discharge in 48 hrs to home with home services  Code Status: Level 1 - Full Code    Subjective:   No acute events overnight  The patient states that he continues to have leg pain, the gabapentin has helped with this  He has not had any abdominal pain, and feels that the cholecystostomy site is not painful  He denies any nausea or vomiting, and denies shortness of breath or chest pain  He understands the plan to re-evaluate his INR, and to then proceed with tube change      Objective:     Vitals:   Temp (24hrs), Av 3 °F (36 8 °C), Min:97 8 °F (36 6 °C), Max:98 6 °F (37 °C)    Temp:  [97 8 °F (36 6 °C)-98 6 °F (37 °C)] 98 4 °F (36 9 °C)  HR:  [59-82] 59  Resp:  [16-20] 16  BP: ()/(55-69) 104/69  SpO2:  [95 %-99 %] 99 %  Body mass index is 38 8 kg/m²  Input and Output Summary (last 24 hours): Intake/Output Summary (Last 24 hours) at 11/8/2022 1629  Last data filed at 11/8/2022 1330  Gross per 24 hour   Intake 940 ml   Output 336 ml   Net 604 ml       Physical Exam:   Physical Exam  Constitutional:       General: He is not in acute distress  Appearance: He is not toxic-appearing  HENT:      Head: Normocephalic and atraumatic  Nose: Nose normal       Mouth/Throat:      Mouth: Mucous membranes are moist       Pharynx: No oropharyngeal exudate or posterior oropharyngeal erythema  Eyes:      Conjunctiva/sclera: Conjunctivae normal       Pupils: Pupils are equal, round, and reactive to light  Cardiovascular:      Rate and Rhythm: Normal rate and regular rhythm  Pulses: Normal pulses  Heart sounds: Normal heart sounds  Pulmonary:      Breath sounds: Normal breath sounds  No wheezing, rhonchi or rales  Abdominal:      General: Bowel sounds are normal  There is no distension  Palpations: Abdomen is soft  Tenderness: There is no abdominal tenderness  Musculoskeletal:         General: No deformity  Cervical back: Neck supple  Right lower leg: Edema present  Left lower leg: Edema present  Comments: Tenderness on dorsiflexion of the bilateral feet  Tenderness on palpation of bilateral calves  Skin:     General: Skin is warm and dry  Comments: Evidence of chronic venous stasis in the lower leg  Neurological:      General: No focal deficit present  Mental Status: He is alert and oriented to person, place, and time  Psychiatric:         Mood and Affect: Mood normal          Behavior: Behavior normal       Comments: Alert and oriented x3            Additional Data:     Labs:  Results from last 7 days   Lab Units 11/08/22  0350 11/06/22  0443 11/05/22  0754   WBC Thousand/uL 6 98   < > 9 25   HEMOGLOBIN g/dL 8 2*   < > 8 3*   HEMATOCRIT % 26 5*   < > 26 2*   PLATELETS Thousands/uL 326   < > 277   NEUTROS PCT %  --   --  68   LYMPHS PCT %  --   --  16   MONOS PCT %  --   --  14*   EOS PCT %  --   --  1    < > = values in this interval not displayed  Results from last 7 days   Lab Units 11/08/22  0350   SODIUM mmol/L 138   POTASSIUM mmol/L 3 6   CHLORIDE mmol/L 102   CO2 mmol/L 26   BUN mg/dL 50*   CREATININE mg/dL 2 13*   ANION GAP mmol/L 10   CALCIUM mg/dL 8 7   ALBUMIN g/dL 2 9*   TOTAL BILIRUBIN mg/dL 0 89   ALK PHOS U/L 95   ALT U/L 23   AST U/L 33   GLUCOSE RANDOM mg/dL 106     Results from last 7 days   Lab Units 11/08/22  1428   INR  2 61*     Results from last 7 days   Lab Units 11/08/22  1605 11/08/22  1136 11/08/22  0757 11/07/22  2159 11/07/22  1753 11/07/22  1146 11/07/22  0810 11/06/22  2028   POC GLUCOSE mg/dl 130 159* 107 130 109 112 114 125         Results from last 7 days   Lab Units 11/04/22  1535   LACTIC ACID mmol/L 1 7   PROCALCITONIN ng/ml 0 07       Lines/Drains:  Invasive Devices  Report    Peripheral Intravenous Line  Duration           Peripheral IV 11/04/22 Right Antecubital 4 days    Peripheral IV 11/08/22 Left;Proximal;Ventral (anterior) Forearm <1 day          Drain  Duration           Cholecystostomy Tube 12 days                      Imaging: No pertinent imaging reviewed  Recent Cultures (last 7 days):   Results from last 7 days   Lab Units 11/04/22  1535   BLOOD CULTURE  No Growth at 72 hrs  No Growth at 72 hrs         Last 24 Hours Medication List:   Current Facility-Administered Medications   Medication Dose Route Frequency Provider Last Rate   • acetaminophen  650 mg Oral Q6H [address] [place] D[place], DO     • cefTRIAXone  2,000 mg Intravenous Q24H [place] Dos[place], DO 2,000 mg (11/07/22 1940)   • gabapentin  100 mg Oral TID Mojgan Mancilla MD     • heparin (porcine)  3-20 Units/kg/hr (Order-Specific) Intravenous Titrated Ta Johnston Cardio, MD 11 1 Units/kg/hr (11/08/22 1442)   • heparin (porcine)  2,000 Units Intravenous Q1H PRN Marquis Sonido MD     • heparin (porcine)  4,000 Units Intravenous Q1H PRN Marquis Sonido MD     • HYDROmorphone  0 2 mg Intravenous Q4H PRN Lyndon Mcgraw, DO     • insulin lispro  2-12 Units Subcutaneous 4x Daily (AC & HS) Avani Alvarez DO     • metroNIDAZOLE  500 mg Intravenous Q8H Lyndon Schultzoms,  mg (11/08/22 1057)   • oxyCODONE  2 5 mg Oral Q6H PRN Dean Apple, DO     • oxyCODONE  5 mg Oral Q6H PRN Dean Apple, DO     • pantoprazole  40 mg Oral Early Morning Lyndon Mcgraw DO     • senna-docusate sodium  1 tablet Oral BID Marquis Sonido MD     • sodium chloride (PF)  5 mL Intracatheter Daily Michelet Orta MD     • sotalol  80 mg Oral Daily Lyndon Mcgraw DO          Today, Patient Was Seen By: Michelet Orta    **Please Note: This note may have been constructed using a voice recognition system  **

## 2022-11-08 NOTE — ASSESSMENT & PLAN NOTE
Lab Results   Component Value Date    EGFR 27 11/08/2022    EGFR 25 11/07/2022    EGFR 29 11/06/2022    CREATININE 2 13 (H) 11/08/2022    CREATININE 2 24 (H) 11/07/2022    CREATININE 2 00 (H) 11/06/2022   · Following BMP

## 2022-11-09 ENCOUNTER — TELEPHONE (OUTPATIENT)
Dept: INTERVENTIONAL RADIOLOGY/VASCULAR | Facility: CLINIC | Age: 87
End: 2022-11-09

## 2022-11-09 LAB
ALBUMIN SERPL BCP-MCNC: 2.8 G/DL (ref 3.5–5)
ALP SERPL-CCNC: 91 U/L (ref 34–104)
ALT SERPL W P-5'-P-CCNC: 17 U/L (ref 7–52)
ANION GAP SERPL CALCULATED.3IONS-SCNC: 9 MMOL/L (ref 4–13)
APTT PPP: 74 SECONDS (ref 23–37)
AST SERPL W P-5'-P-CCNC: 24 U/L (ref 13–39)
BACTERIA BLD CULT: NORMAL
BACTERIA BLD CULT: NORMAL
BILIRUB SERPL-MCNC: 0.66 MG/DL (ref 0.2–1)
BUN SERPL-MCNC: 49 MG/DL (ref 5–25)
CALCIUM ALBUM COR SERPL-MCNC: 9.7 MG/DL (ref 8.3–10.1)
CALCIUM SERPL-MCNC: 8.7 MG/DL (ref 8.4–10.2)
CHLORIDE SERPL-SCNC: 104 MMOL/L (ref 96–108)
CO2 SERPL-SCNC: 24 MMOL/L (ref 21–32)
CREAT SERPL-MCNC: 1.67 MG/DL (ref 0.6–1.3)
ERYTHROCYTE [DISTWIDTH] IN BLOOD BY AUTOMATED COUNT: 18.3 % (ref 11.6–15.1)
GFR SERPL CREATININE-BSD FRML MDRD: 36 ML/MIN/1.73SQ M
GLUCOSE SERPL-MCNC: 117 MG/DL (ref 65–140)
GLUCOSE SERPL-MCNC: 126 MG/DL (ref 65–140)
GLUCOSE SERPL-MCNC: 127 MG/DL (ref 65–140)
GLUCOSE SERPL-MCNC: 158 MG/DL (ref 65–140)
GLUCOSE SERPL-MCNC: 167 MG/DL (ref 65–140)
HCT VFR BLD AUTO: 26.6 % (ref 36.5–49.3)
HGB BLD-MCNC: 8.3 G/DL (ref 12–17)
INR PPP: 2.1 (ref 0.84–1.19)
MCH RBC QN AUTO: 27.2 PG (ref 26.8–34.3)
MCHC RBC AUTO-ENTMCNC: 31.2 G/DL (ref 31.4–37.4)
MCV RBC AUTO: 87 FL (ref 82–98)
PLATELET # BLD AUTO: 337 THOUSANDS/UL (ref 149–390)
PMV BLD AUTO: 10 FL (ref 8.9–12.7)
POTASSIUM SERPL-SCNC: 3.8 MMOL/L (ref 3.5–5.3)
PROT SERPL-MCNC: 6.4 G/DL (ref 6.4–8.4)
PROTHROMBIN TIME: 23.9 SECONDS (ref 11.6–14.5)
RBC # BLD AUTO: 3.05 MILLION/UL (ref 3.88–5.62)
SODIUM SERPL-SCNC: 137 MMOL/L (ref 135–147)
WBC # BLD AUTO: 6.94 THOUSAND/UL (ref 4.31–10.16)

## 2022-11-09 PROCEDURE — 80053 COMPREHEN METABOLIC PANEL: CPT | Performed by: INTERNAL MEDICINE

## 2022-11-09 PROCEDURE — 82948 REAGENT STRIP/BLOOD GLUCOSE: CPT

## 2022-11-09 PROCEDURE — 85027 COMPLETE CBC AUTOMATED: CPT | Performed by: INTERNAL MEDICINE

## 2022-11-09 PROCEDURE — 99232 SBSQ HOSP IP/OBS MODERATE 35: CPT | Performed by: INTERNAL MEDICINE

## 2022-11-09 PROCEDURE — 85730 THROMBOPLASTIN TIME PARTIAL: CPT | Performed by: INTERNAL MEDICINE

## 2022-11-09 PROCEDURE — 85610 PROTHROMBIN TIME: CPT | Performed by: INTERNAL MEDICINE

## 2022-11-09 RX ORDER — COLCHICINE 0.6 MG/1
1.2 TABLET ORAL ONCE
Status: COMPLETED | OUTPATIENT
Start: 2022-11-09 | End: 2022-11-09

## 2022-11-09 RX ORDER — PHYTONADIONE 10 MG/ML
5 INJECTION, EMULSION INTRAMUSCULAR; INTRAVENOUS; SUBCUTANEOUS ONCE
Status: COMPLETED | OUTPATIENT
Start: 2022-11-09 | End: 2022-11-09

## 2022-11-09 RX ORDER — POLYETHYLENE GLYCOL 3350 17 G/17G
17 POWDER, FOR SOLUTION ORAL DAILY PRN
Status: DISCONTINUED | OUTPATIENT
Start: 2022-11-09 | End: 2022-11-14 | Stop reason: HOSPADM

## 2022-11-09 RX ORDER — BISACODYL 10 MG
10 SUPPOSITORY, RECTAL RECTAL DAILY PRN
Status: DISCONTINUED | OUTPATIENT
Start: 2022-11-09 | End: 2022-11-14 | Stop reason: HOSPADM

## 2022-11-09 RX ORDER — POTASSIUM CHLORIDE 20 MEQ/1
20 TABLET, EXTENDED RELEASE ORAL ONCE
Status: COMPLETED | OUTPATIENT
Start: 2022-11-09 | End: 2022-11-09

## 2022-11-09 RX ORDER — COLCHICINE 0.6 MG/1
0.6 TABLET ORAL ONCE
Status: COMPLETED | OUTPATIENT
Start: 2022-11-09 | End: 2022-11-09

## 2022-11-09 RX ADMIN — METRONIDAZOLE 500 MG: 5 INJECTION, SOLUTION INTRAVENOUS at 19:25

## 2022-11-09 RX ADMIN — HEPARIN SODIUM 11.1 UNITS/KG/HR: 10000 INJECTION, SOLUTION INTRAVENOUS at 13:56

## 2022-11-09 RX ADMIN — OXYCODONE HYDROCHLORIDE 5 MG: 5 TABLET ORAL at 01:29

## 2022-11-09 RX ADMIN — PANTOPRAZOLE SODIUM 40 MG: 40 TABLET, DELAYED RELEASE ORAL at 05:01

## 2022-11-09 RX ADMIN — ACETAMINOPHEN 650 MG: 325 TABLET, FILM COATED ORAL at 12:11

## 2022-11-09 RX ADMIN — METRONIDAZOLE 500 MG: 5 INJECTION, SOLUTION INTRAVENOUS at 03:07

## 2022-11-09 RX ADMIN — COLCHICINE 1.2 MG: 0.6 TABLET ORAL at 12:45

## 2022-11-09 RX ADMIN — ACETAMINOPHEN 650 MG: 325 TABLET, FILM COATED ORAL at 17:00

## 2022-11-09 RX ADMIN — GABAPENTIN 100 MG: 100 CAPSULE ORAL at 17:00

## 2022-11-09 RX ADMIN — POTASSIUM CHLORIDE 20 MEQ: 1500 TABLET, EXTENDED RELEASE ORAL at 06:28

## 2022-11-09 RX ADMIN — DICLOFENAC SODIUM TOPICAL GEL, 1%, 2 G: 10 GEL TOPICAL at 12:55

## 2022-11-09 RX ADMIN — GABAPENTIN 100 MG: 100 CAPSULE ORAL at 21:44

## 2022-11-09 RX ADMIN — SOTALOL HYDROCHLORIDE 80 MG: 80 TABLET ORAL at 08:43

## 2022-11-09 RX ADMIN — COLCHICINE 0.6 MG: 0.6 TABLET ORAL at 13:57

## 2022-11-09 RX ADMIN — ACETAMINOPHEN 650 MG: 325 TABLET, FILM COATED ORAL at 05:01

## 2022-11-09 RX ADMIN — OXYCODONE HYDROCHLORIDE 5 MG: 5 TABLET ORAL at 08:34

## 2022-11-09 RX ADMIN — CEFTRIAXONE 2000 MG: 2 INJECTION, SOLUTION INTRAVENOUS at 17:01

## 2022-11-09 RX ADMIN — SENNOSIDES AND DOCUSATE SODIUM 1 TABLET: 8.6; 5 TABLET ORAL at 08:43

## 2022-11-09 RX ADMIN — DICLOFENAC SODIUM TOPICAL GEL, 1%, 2 G: 10 GEL TOPICAL at 17:01

## 2022-11-09 RX ADMIN — INSULIN LISPRO 2 UNITS: 100 INJECTION, SOLUTION INTRAVENOUS; SUBCUTANEOUS at 21:45

## 2022-11-09 RX ADMIN — INSULIN LISPRO 2 UNITS: 100 INJECTION, SOLUTION INTRAVENOUS; SUBCUTANEOUS at 12:12

## 2022-11-09 RX ADMIN — PHYTONADIONE 5 MG: 10 INJECTION, EMULSION INTRAMUSCULAR; INTRAVENOUS; SUBCUTANEOUS at 08:35

## 2022-11-09 RX ADMIN — DICLOFENAC SODIUM TOPICAL GEL, 1%, 2 G: 10 GEL TOPICAL at 21:47

## 2022-11-09 RX ADMIN — SENNOSIDES AND DOCUSATE SODIUM 1 TABLET: 8.6; 5 TABLET ORAL at 17:00

## 2022-11-09 RX ADMIN — METRONIDAZOLE 500 MG: 5 INJECTION, SOLUTION INTRAVENOUS at 12:11

## 2022-11-09 RX ADMIN — OXYCODONE HYDROCHLORIDE 5 MG: 5 TABLET ORAL at 21:44

## 2022-11-09 RX ADMIN — SODIUM CHLORIDE 5 ML: 9 INJECTION INTRAMUSCULAR; INTRAVENOUS; SUBCUTANEOUS at 08:38

## 2022-11-09 RX ADMIN — GABAPENTIN 100 MG: 100 CAPSULE ORAL at 08:45

## 2022-11-09 NOTE — TELEPHONE ENCOUNTER
IR Clinic Follow-Up:    Patient clinical visit in  Within 2 weeks after discharge from current hospitalization November 9    Schedule visit for the first available IR Physician: No                *If no, schedule for:     Jaylin Shirley doing cholangioscopy        Type of Visit:   Video or telephone    Additional Details:     Discussed cholangioscopy    Referring Dr Manuel Dumont

## 2022-11-09 NOTE — PROGRESS NOTES
Unable to weigh patient this morning, patient too weak to stand on scale and bed weight not working

## 2022-11-09 NOTE — PLAN OF CARE
Problem: Potential for Falls  Goal: Patient will remain free of falls  Description: INTERVENTIONS:  - Educate patient/family on patient safety including physical limitations  - Instruct patient to call for assistance with activity   - Consult OT/PT to assist with strengthening/mobility   - Keep Call bell within reach  - Keep bed low and locked with side rails adjusted as appropriate  - Keep care items and personal belongings within reach  - Initiate and maintain comfort rounds  - Make Fall Risk Sign visible to staff  - Offer Toileting every  Hours, in advance of need  - Initiate/Maintain alarm  - Obtain necessary fall risk management equipment:   - Apply yellow socks and bracelet for high fall risk patients  - Consider moving patient to room near nurses station  Outcome: Progressing     Problem: Prexisting or High Potential for Compromised Skin Integrity  Goal: Skin integrity is maintained or improved  Description: INTERVENTIONS:  - Identify patients at risk for skin breakdown  - Assess and monitor skin integrity  - Assess and monitor nutrition and hydration status  - Monitor labs   - Assess for incontinence   - Turn and reposition patient  - Assist with mobility/ambulation  - Relieve pressure over bony prominences  - Avoid friction and shearing  - Provide appropriate hygiene as needed including keeping skin clean and dry  - Evaluate need for skin moisturizer/barrier cream  - Collaborate with interdisciplinary team   - Patient/family teaching  - Consider wound care consult   Outcome: Progressing     Problem: MOBILITY - ADULT  Goal: Maintain or return to baseline ADL function  Description: INTERVENTIONS:  -  Assess patient's ability to carry out ADLs; assess patient's baseline for ADL function and identify physical deficits which impact ability to perform ADLs (bathing, care of mouth/teeth, toileting, grooming, dressing, etc )  - Assess/evaluate cause of self-care deficits   - Assess range of motion  - Assess patient's mobility; develop plan if impaired  - Assess patient's need for assistive devices and provide as appropriate  - Encourage maximum independence but intervene and supervise when necessary  - Involve family in performance of ADLs  - Assess for home care needs following discharge   - Consider OT consult to assist with ADL evaluation and planning for discharge  - Provide patient education as appropriate  Outcome: Progressing  Goal: Maintains/Returns to pre admission functional level  Description: INTERVENTIONS:  - Perform BMAT or MOVE assessment daily    - Set and communicate daily mobility goal to care team and patient/family/caregiver  - Collaborate with rehabilitation services on mobility goals if consulted  - Perform Range of Motion  times a day  - Reposition patient every hours    - Dangle patient  times a day  - Stand patient  times a day  - Ambulate patient  times a day  - Out of bed to chair  times a day   - Out of bed for meals  times a day  - Out of bed for toileting  - Record patient progress and toleration of activity level   Outcome: Progressing     Problem: HEMATOLOGIC - ADULT  Goal: Maintains hematologic stability  Description: INTERVENTIONS  - Assess for signs and symptoms of bleeding or hemorrhage  - Monitor labs  - Administer supportive blood products/factors as ordered and appropriate  Outcome: Progressing

## 2022-11-09 NOTE — PLAN OF CARE
Problem: Potential for Falls  Goal: Patient will remain free of falls  Description: INTERVENTIONS:  - Educate patient/family on patient safety including physical limitations  - Instruct patient to call for assistance with activity   - Consult OT/PT to assist with strengthening/mobility   - Keep Call bell within reach  - Keep bed low and locked with side rails adjusted as appropriate  - Keep care items and personal belongings within reach  - Initiate and maintain comfort rounds  - Make Fall Risk Sign visible to staff  - Offer Toileting every 2 Hours, in advance of need  - Initiate/Maintain bed alarm  - Apply yellow socks and bracelet for high fall risk patients  - Consider moving patient to room near nurses station  Outcome: Progressing     Problem: Prexisting or High Potential for Compromised Skin Integrity  Goal: Skin integrity is maintained or improved  Description: INTERVENTIONS:  - Identify patients at risk for skin breakdown  - Assess and monitor skin integrity  - Assess and monitor nutrition and hydration status  - Monitor labs   - Assess for incontinence   - Turn and reposition patient  - Assist with mobility/ambulation  - Relieve pressure over bony prominences  - Avoid friction and shearing  - Provide appropriate hygiene as needed including keeping skin clean and dry  - Evaluate need for skin moisturizer/barrier cream  - Collaborate with interdisciplinary team   - Patient/family teaching  - Consider wound care consult   Outcome: Progressing     Problem: MOBILITY - ADULT  Goal: Maintain or return to baseline ADL function  Description: INTERVENTIONS:  -  Assess patient's ability to carry out ADLs; assess patient's baseline for ADL function and identify physical deficits which impact ability to perform ADLs (bathing, care of mouth/teeth, toileting, grooming, dressing, etc )  - Assess/evaluate cause of self-care deficits   - Assess range of motion  - Assess patient's mobility; develop plan if impaired  - Assess patient's need for assistive devices and provide as appropriate  - Encourage maximum independence but intervene and supervise when necessary  - Involve family in performance of ADLs  - Assess for home care needs following discharge   - Consider OT consult to assist with ADL evaluation and planning for discharge  - Provide patient education as appropriate  Outcome: Progressing  Goal: Maintains/Returns to pre admission functional level  Description: INTERVENTIONS:  - Perform BMAT or MOVE assessment daily    - Set and communicate daily mobility goal to care team and patient/family/caregiver  - Collaborate with rehabilitation services on mobility goals if consulted  - Perform Range of Motion 3 times a day  - Reposition patient every 2 hours    - Dangle patient 3 times a day  - Stand patient 3 times a day  - Ambulate patient 3 times a day  - Out of bed to chair 3 times a day   - Out of bed for meals 3 times a day  - Out of bed for toileting  - Record patient progress and toleration of activity level   Outcome: Progressing     Problem: HEMATOLOGIC - ADULT  Goal: Maintains hematologic stability  Description: INTERVENTIONS  - Assess for signs and symptoms of bleeding or hemorrhage  - Monitor labs  - Administer supportive blood products/factors as ordered and appropriate  Outcome: Progressing

## 2022-11-09 NOTE — PROGRESS NOTES
[facility]  Progress Note - Larry Barbour 1935, 81 y o  male MRN: [id number]  Unit/Bed#: S -01 Encounter: [id number]  Primary Care Provider: Dex Majano DO   Date and time admitted to hospital: 11/4/2022  2:38 PM    * Acute on chronic cholecystitis  Assessment & Plan  · Patient has a history of acute cholecystitis s/p percutaneous cholecystostomy tube  · Now presenting with LFT elevation and right upper quadrant pain  · CT abdomen/pelvis: No acute findings in the chest abdomen or pelvis Transhepatic percutaneous cholecystostomy tube remains in place  Persistent gallbladder inflammatory changes  · INR 2 10 this a m  Plan:  · Surgery consulted, suggested IR evaluation  · IR consult planning for tube check, discussed and they intend to do to change once INR drops to approximately 1 5  · Vitamin K given on 11/09/2022  · GI was consulted regarding the possibility of acute cholangitis  No plan for ERCP or MRCP at this time  · Continue IV antibiotics with ceftriaxone and flagyl  Patient had had symptomatic improvement  · Continue to monitor CMP  · NPO after midnight for tube change in the morning  Leg pain  Assessment & Plan   The patient has been complaining of bilateral leg pain, likely secondary to his edema   Versus neuropathic pain  He describes it as a shooting pain, and that is exacerbated when he moves legs  -  Increase gabapentin to t i d  100 mg   - initiate colchicine for suspicion gout affecting bilateral ankles  - initiated compression stockings  Diastolic congestive heart failure [facility])  Assessment & Plan  Wt Readings from Last 3 Encounters:   10/25/22 (!) 137 kg (302 lb)   10/24/22 (!) 138 kg (305 lb 3 2 oz)   09/26/22 (!) 139 kg (307 lb)     ·  not currently diuresing    · Reassess volume status daily with daily weight and I/O         Stage 3a chronic kidney disease with elevated creatinine  Assessment & Plan  Lab Results   Component Value Date    EGFR 36 2022    EGFR 27 2022    EGFR 25 2022    CREATININE 1 67 (H) 2022    CREATININE 2 13 (H) 2022    CREATININE 2 24 (H) 2022   · Following BMP    Paroxysmal atrial fibrillation (HCC)  Assessment & Plan  · Hold warfarin, INR therapeutic, continuing to reduce for IR procedure  · Continue sotalol    Thoracic back pain  Assessment & Plan  · Consider MRI if patient's pain better controlled or increased concern for bone infection  · He did not complain of back pain on 2022 - 2022    OBINNA (obstructive sleep apnea)  Assessment & Plan  · Continue cpap    Thrombophlebitis  Assessment & Plan  · S/p IVC filter   · Warfarin held as above  · Patient ordered for Doppler of lower extremities for lower extremity pain  ·   Unrevealing for lower extremities and right  Upper extremity, demonstrated left upper extremity thrombophlebitis  ·   Ordered warm compresses for thrombophlebitis  VTE Pharmacologic Prophylaxis: VTE Score: 3 Moderate Risk (Score 3-4) - Pharmacological DVT Prophylaxis Ordered: heparin drip  Patient Centered Rounds: I performed bedside rounds with nursing staff today  Discussions with Specialists or Other Care Team Provider:  IR    Education and Discussions with Family / Patient: Updated  (wife) at bedside  Current Length of Stay: 5 day(s)  Current Patient Status: Inpatient   Discharge Plan: Anticipate discharge in 48 hrs to rehab facility  Code Status: Level 1 - Full Code    Subjective:   No acute events overnight  Patient states that he is having some leg pain, but it is bearable  He feels that his legs are a bit swollen  He is not having any abdominal pain, nausea, vomiting, chest pain, or shortness of breath  He is aware of the plan to reduce his INR in order to have his procedure for tube change tomorrow      Objective:     Vitals:   Temp (24hrs), Av °F (36 7 °C), Min:98 °F (36 7 °C), Max:98 °F (36 7 °C)    Temp: [98 °F (36 7 °C)] 98 °F (36 7 °C)  HR:  [66-74] 70  Resp:  [16] 16  BP: (109-136)/(54-63) 136/63  SpO2:  [93 %-96 %] 96 %  Body mass index is 38 8 kg/m²  Input and Output Summary (last 24 hours): Intake/Output Summary (Last 24 hours) at 11/9/2022 2202  Last data filed at 11/9/2022 1839  Gross per 24 hour   Intake 180 ml   Output --   Net 180 ml       Physical Exam:   Physical Exam  Constitutional:       General: He is not in acute distress  Appearance: He is not toxic-appearing  HENT:      Head: Normocephalic and atraumatic  Nose: Nose normal       Mouth/Throat:      Mouth: Mucous membranes are moist       Pharynx: No oropharyngeal exudate or posterior oropharyngeal erythema  Eyes:      Conjunctiva/sclera: Conjunctivae normal       Pupils: Pupils are equal, round, and reactive to light  Cardiovascular:      Rate and Rhythm: Normal rate and regular rhythm  Pulses: Normal pulses  Heart sounds: Normal heart sounds  Pulmonary:      Breath sounds: Normal breath sounds  No wheezing, rhonchi or rales  Abdominal:      General: Bowel sounds are normal  There is no distension  Palpations: Abdomen is soft  Tenderness: There is no abdominal tenderness  Musculoskeletal:         General: No deformity  Cervical back: Neck supple  Right lower leg: Edema present  Left lower leg: Edema present  Comments: Tenderness on dorsiflexion of the bilateral feet  Tenderness on palpation of bilateral calves  Skin:     General: Skin is warm and dry  Comments: Evidence of chronic venous stasis in the lower leg  Neurological:      General: No focal deficit present  Mental Status: He is alert and oriented to person, place, and time  Psychiatric:         Mood and Affect: Mood normal          Behavior: Behavior normal       Comments: Alert and oriented x3            Additional Data:     Labs:  Results from last 7 days   Lab Units 11/09/22  0300 11/06/22  3167 11/05/22  0754   WBC Thousand/uL 6 94   < > 9 25   HEMOGLOBIN g/dL 8 3*   < > 8 3*   HEMATOCRIT % 26 6*   < > 26 2*   PLATELETS Thousands/uL 337   < > 277   NEUTROS PCT %  --   --  68   LYMPHS PCT %  --   --  16   MONOS PCT %  --   --  14*   EOS PCT %  --   --  1    < > = values in this interval not displayed  Results from last 7 days   Lab Units 11/09/22  0300   SODIUM mmol/L 137   POTASSIUM mmol/L 3 8   CHLORIDE mmol/L 104   CO2 mmol/L 24   BUN mg/dL 49*   CREATININE mg/dL 1 67*   ANION GAP mmol/L 9   CALCIUM mg/dL 8 7   ALBUMIN g/dL 2 8*   TOTAL BILIRUBIN mg/dL 0 66   ALK PHOS U/L 91   ALT U/L 17   AST U/L 24   GLUCOSE RANDOM mg/dL 126     Results from last 7 days   Lab Units 11/09/22  0307   INR  2 10*     Results from last 7 days   Lab Units 11/09/22  2054 11/09/22  1611 11/09/22  1144 11/09/22  0747 11/08/22  2040 11/08/22  1605 11/08/22  1136 11/08/22  0757 11/07/22  2159 11/07/22  1753 11/07/22  1146 11/07/22  0810   POC GLUCOSE mg/dl 167* 127 158* 117 158* 130 159* 107 130 109 112 114         Results from last 7 days   Lab Units 11/04/22  1535   LACTIC ACID mmol/L 1 7   PROCALCITONIN ng/ml 0 07       Lines/Drains:  Invasive Devices  Report    Peripheral Intravenous Line  Duration           Peripheral IV 11/08/22 Left;Proximal;Ventral (anterior) Forearm 1 day          Drain  Duration           Cholecystostomy Tube 13 days                      Imaging: No pertinent imaging reviewed  Recent Cultures (last 7 days):   Results from last 7 days   Lab Units 11/04/22  1535   BLOOD CULTURE  No Growth After 5 Days  No Growth After 5 Days         Last 24 Hours Medication List:   Current Facility-Administered Medications   Medication Dose Route Frequency Provider Last Rate   • acetaminophen  650 mg Oral Q6H [facility] Anju Pollock DO     • bisacodyl  10 mg Rectal Daily PRN Anju oPllock DO     • cefTRIAXone  2,000 mg Intravenous Q24H Anju Pollock DO 2,000 mg (11/09/22 1701)   • Diclofenac Sodium  2 g Topical 4x Daily Agata Bernard DO     • gabapentin  100 mg Oral TID Jmaison Hood MD     • heparin (porcine)  3-20 Units/kg/hr (Order-Specific) Intravenous Titrated Jamison Hood MD 11 1 Units/kg/hr (11/09/22 1356)   • heparin (porcine)  2,000 Units Intravenous Q1H PRN Hafsa Montilla MD     • heparin (porcine)  4,000 Units Intravenous Q1H PRN Hafsa Montilla MD     • HYDROmorphone  0 2 mg Intravenous Q4H PRN Agata Bernard DO     • insulin lispro  2-12 Units Subcutaneous 4x Daily (AC & HS) Itzel Rico DO     • metroNIDAZOLE  500 mg Intravenous Q8H Agata Bernard  mg (11/09/22 1925)   • oxyCODONE  2 5 mg Oral Q6H PRN Gretchen Wagner DO     • oxyCODONE  5 mg Oral Q6H PRN Gretchen Wagner DO     • pantoprazole  40 mg Oral Early Morning Agata Bernard DO     • polyethylene glycol  17 g Oral Daily PRN Agata Bernard DO     • senna-docusate sodium  1 tablet Oral BID Hafsa Montilla MD     • sodium chloride (PF)  5 mL Intracatheter Daily Jamison Hood MD     • sotalol  80 mg Oral Daily Agata Bernard DO          Today, Patient Was Seen By: Jamison Hood    **Please Note: This note may have been constructed using a voice recognition system  **

## 2022-11-10 ENCOUNTER — APPOINTMENT (INPATIENT)
Dept: RADIOLOGY | Facility: HOSPITAL | Age: 87
DRG: 444 | End: 2022-11-10
Attending: RADIOLOGY
Payer: MEDICARE

## 2022-11-10 LAB
ALBUMIN SERPL BCP-MCNC: 2.9 G/DL (ref 3.5–5)
ALP SERPL-CCNC: 91 U/L (ref 34–104)
ALT SERPL W P-5'-P-CCNC: 13 U/L (ref 7–52)
ANION GAP SERPL CALCULATED.3IONS-SCNC: 10 MMOL/L (ref 4–13)
APTT PPP: 58 SECONDS (ref 23–37)
APTT PPP: 62 SECONDS (ref 23–37)
AST SERPL W P-5'-P-CCNC: 20 U/L (ref 13–39)
BILIRUB SERPL-MCNC: 0.58 MG/DL (ref 0.2–1)
BUN SERPL-MCNC: 37 MG/DL (ref 5–25)
CALCIUM ALBUM COR SERPL-MCNC: 9.6 MG/DL (ref 8.3–10.1)
CALCIUM SERPL-MCNC: 8.7 MG/DL (ref 8.4–10.2)
CHLORIDE SERPL-SCNC: 106 MMOL/L (ref 96–108)
CO2 SERPL-SCNC: 23 MMOL/L (ref 21–32)
CREAT SERPL-MCNC: 1.26 MG/DL (ref 0.6–1.3)
ERYTHROCYTE [DISTWIDTH] IN BLOOD BY AUTOMATED COUNT: 18.5 % (ref 11.6–15.1)
GFR SERPL CREATININE-BSD FRML MDRD: 51 ML/MIN/1.73SQ M
GLUCOSE SERPL-MCNC: 118 MG/DL (ref 65–140)
GLUCOSE SERPL-MCNC: 122 MG/DL (ref 65–140)
GLUCOSE SERPL-MCNC: 144 MG/DL (ref 65–140)
GLUCOSE SERPL-MCNC: 144 MG/DL (ref 65–140)
GLUCOSE SERPL-MCNC: 151 MG/DL (ref 65–140)
HCT VFR BLD AUTO: 28.2 % (ref 36.5–49.3)
HGB BLD-MCNC: 8.6 G/DL (ref 12–17)
INR PPP: 1.63 (ref 0.84–1.19)
MCH RBC QN AUTO: 27 PG (ref 26.8–34.3)
MCHC RBC AUTO-ENTMCNC: 30.5 G/DL (ref 31.4–37.4)
MCV RBC AUTO: 89 FL (ref 82–98)
PLATELET # BLD AUTO: 353 THOUSANDS/UL (ref 149–390)
PMV BLD AUTO: 9.6 FL (ref 8.9–12.7)
POTASSIUM SERPL-SCNC: 3.9 MMOL/L (ref 3.5–5.3)
PROT SERPL-MCNC: 6.6 G/DL (ref 6.4–8.4)
PROTHROMBIN TIME: 19.6 SECONDS (ref 11.6–14.5)
RBC # BLD AUTO: 3.18 MILLION/UL (ref 3.88–5.62)
SODIUM SERPL-SCNC: 139 MMOL/L (ref 135–147)
WBC # BLD AUTO: 7.68 THOUSAND/UL (ref 4.31–10.16)

## 2022-11-10 PROCEDURE — 80053 COMPREHEN METABOLIC PANEL: CPT | Performed by: INTERNAL MEDICINE

## 2022-11-10 PROCEDURE — C1769 GUIDE WIRE: HCPCS

## 2022-11-10 PROCEDURE — C1894 INTRO/SHEATH, NON-LASER: HCPCS

## 2022-11-10 PROCEDURE — 49423 EXCHANGE DRAINAGE CATHETER: CPT | Performed by: INTERNAL MEDICINE

## 2022-11-10 PROCEDURE — 97110 THERAPEUTIC EXERCISES: CPT

## 2022-11-10 PROCEDURE — 47536 EXCHANGE BILIARY DRG CATH: CPT

## 2022-11-10 PROCEDURE — 85027 COMPLETE CBC AUTOMATED: CPT | Performed by: INTERNAL MEDICINE

## 2022-11-10 PROCEDURE — 85730 THROMBOPLASTIN TIME PARTIAL: CPT | Performed by: INTERNAL MEDICINE

## 2022-11-10 PROCEDURE — 0F9430Z DRAINAGE OF GALLBLADDER WITH DRAINAGE DEVICE, PERCUTANEOUS APPROACH: ICD-10-PCS | Performed by: INTERNAL MEDICINE

## 2022-11-10 PROCEDURE — 99232 SBSQ HOSP IP/OBS MODERATE 35: CPT | Performed by: INTERNAL MEDICINE

## 2022-11-10 PROCEDURE — 85610 PROTHROMBIN TIME: CPT | Performed by: INTERNAL MEDICINE

## 2022-11-10 PROCEDURE — 82948 REAGENT STRIP/BLOOD GLUCOSE: CPT

## 2022-11-10 PROCEDURE — C1729 CATH, DRAINAGE: HCPCS

## 2022-11-10 PROCEDURE — 97530 THERAPEUTIC ACTIVITIES: CPT

## 2022-11-10 RX ORDER — MIDAZOLAM HYDROCHLORIDE 2 MG/2ML
INJECTION, SOLUTION INTRAMUSCULAR; INTRAVENOUS CODE/TRAUMA/SEDATION MEDICATION
Status: COMPLETED | OUTPATIENT
Start: 2022-11-10 | End: 2022-11-10

## 2022-11-10 RX ORDER — HEPARIN SODIUM 1000 [USP'U]/ML
4000 INJECTION, SOLUTION INTRAVENOUS; SUBCUTANEOUS
Status: DISCONTINUED | OUTPATIENT
Start: 2022-11-10 | End: 2022-11-13

## 2022-11-10 RX ORDER — COLCHICINE 0.6 MG/1
0.6 TABLET ORAL DAILY
Status: DISCONTINUED | OUTPATIENT
Start: 2022-11-10 | End: 2022-11-13

## 2022-11-10 RX ORDER — HEPARIN SODIUM 1000 [USP'U]/ML
2000 INJECTION, SOLUTION INTRAVENOUS; SUBCUTANEOUS
Status: DISCONTINUED | OUTPATIENT
Start: 2022-11-10 | End: 2022-11-13

## 2022-11-10 RX ORDER — HEPARIN SODIUM 10000 [USP'U]/100ML
3-20 INJECTION, SOLUTION INTRAVENOUS
Status: DISCONTINUED | OUTPATIENT
Start: 2022-11-10 | End: 2022-11-13

## 2022-11-10 RX ORDER — WARFARIN SODIUM 5 MG/1
5 TABLET ORAL
Status: DISCONTINUED | OUTPATIENT
Start: 2022-11-10 | End: 2022-11-10

## 2022-11-10 RX ORDER — WARFARIN SODIUM 7.5 MG/1
7.5 TABLET ORAL
Status: DISCONTINUED | OUTPATIENT
Start: 2022-11-10 | End: 2022-11-13

## 2022-11-10 RX ORDER — POLYETHYLENE GLYCOL 3350 17 G/17G
17 POWDER, FOR SOLUTION ORAL ONCE
Status: COMPLETED | OUTPATIENT
Start: 2022-11-10 | End: 2022-11-10

## 2022-11-10 RX ORDER — FENTANYL CITRATE 50 UG/ML
INJECTION, SOLUTION INTRAMUSCULAR; INTRAVENOUS CODE/TRAUMA/SEDATION MEDICATION
Status: COMPLETED | OUTPATIENT
Start: 2022-11-10 | End: 2022-11-10

## 2022-11-10 RX ADMIN — INSULIN LISPRO 2 UNITS: 100 INJECTION, SOLUTION INTRAVENOUS; SUBCUTANEOUS at 16:40

## 2022-11-10 RX ADMIN — ACETAMINOPHEN 650 MG: 325 TABLET, FILM COATED ORAL at 16:38

## 2022-11-10 RX ADMIN — PANTOPRAZOLE SODIUM 40 MG: 40 TABLET, DELAYED RELEASE ORAL at 04:40

## 2022-11-10 RX ADMIN — DICLOFENAC SODIUM TOPICAL GEL, 1%, 2 G: 10 GEL TOPICAL at 21:02

## 2022-11-10 RX ADMIN — METRONIDAZOLE 500 MG: 5 INJECTION, SOLUTION INTRAVENOUS at 19:51

## 2022-11-10 RX ADMIN — METRONIDAZOLE 500 MG: 5 INJECTION, SOLUTION INTRAVENOUS at 04:40

## 2022-11-10 RX ADMIN — GABAPENTIN 100 MG: 100 CAPSULE ORAL at 16:38

## 2022-11-10 RX ADMIN — IOHEXOL 40 ML: 350 INJECTION, SOLUTION INTRAVENOUS at 09:39

## 2022-11-10 RX ADMIN — CEFTRIAXONE 2000 MG: 2 INJECTION, SOLUTION INTRAVENOUS at 19:51

## 2022-11-10 RX ADMIN — HEPARIN SODIUM 2000 UNITS: 1000 INJECTION INTRAVENOUS; SUBCUTANEOUS at 19:50

## 2022-11-10 RX ADMIN — COLCHICINE 0.6 MG: 0.6 TABLET ORAL at 13:57

## 2022-11-10 RX ADMIN — POLYETHYLENE GLYCOL 3350 17 G: 17 POWDER, FOR SOLUTION ORAL at 18:10

## 2022-11-10 RX ADMIN — SENNOSIDES AND DOCUSATE SODIUM 1 TABLET: 8.6; 5 TABLET ORAL at 09:34

## 2022-11-10 RX ADMIN — DICLOFENAC SODIUM TOPICAL GEL, 1%, 2 G: 10 GEL TOPICAL at 16:39

## 2022-11-10 RX ADMIN — SENNOSIDES AND DOCUSATE SODIUM 1 TABLET: 8.6; 5 TABLET ORAL at 16:39

## 2022-11-10 RX ADMIN — DICLOFENAC SODIUM TOPICAL GEL, 1%, 2 G: 10 GEL TOPICAL at 09:37

## 2022-11-10 RX ADMIN — GABAPENTIN 100 MG: 100 CAPSULE ORAL at 09:34

## 2022-11-10 RX ADMIN — HEPARIN SODIUM 11.1 UNITS/KG/HR: 10000 INJECTION, SOLUTION INTRAVENOUS at 11:41

## 2022-11-10 RX ADMIN — SOTALOL HYDROCHLORIDE 80 MG: 80 TABLET ORAL at 09:34

## 2022-11-10 RX ADMIN — OXYCODONE HYDROCHLORIDE 5 MG: 5 TABLET ORAL at 21:54

## 2022-11-10 RX ADMIN — FENTANYL CITRATE 25 MCG: 50 INJECTION, SOLUTION INTRAMUSCULAR; INTRAVENOUS at 08:55

## 2022-11-10 RX ADMIN — WARFARIN SODIUM 7.5 MG: 7.5 TABLET ORAL at 18:10

## 2022-11-10 RX ADMIN — ACETAMINOPHEN 650 MG: 325 TABLET, FILM COATED ORAL at 23:44

## 2022-11-10 RX ADMIN — GABAPENTIN 100 MG: 100 CAPSULE ORAL at 20:58

## 2022-11-10 RX ADMIN — METRONIDAZOLE 500 MG: 5 INJECTION, SOLUTION INTRAVENOUS at 11:30

## 2022-11-10 RX ADMIN — MIDAZOLAM HYDROCHLORIDE 0.5 MG: 1 INJECTION, SOLUTION INTRAMUSCULAR; INTRAVENOUS at 08:55

## 2022-11-10 RX ADMIN — ACETAMINOPHEN 650 MG: 325 TABLET, FILM COATED ORAL at 04:40

## 2022-11-10 NOTE — ASSESSMENT & PLAN NOTE
The patient has been complaining of bilateral leg pain, likely secondary to his edema   Versus neuropathic pain  He describes it as a shooting pain, and that is exacerbated when he moves legs  Leg pain is improving with colchicine     -  Increase gabapentin to t i d  100 mg   - Continuing colchicine for suspicion gout affecting bilateral ankles  - initiated compression stockings

## 2022-11-10 NOTE — ASSESSMENT & PLAN NOTE
Lab Results   Component Value Date    EGFR 51 11/10/2022    EGFR 36 11/09/2022    EGFR 27 11/08/2022    CREATININE 1 26 11/10/2022    CREATININE 1 67 (H) 11/09/2022    CREATININE 2 13 (H) 11/08/2022   · Following BMP

## 2022-11-10 NOTE — ASSESSMENT & PLAN NOTE
· Patient has a history of acute cholecystitis s/p percutaneous cholecystostomy tube  · Now presenting with LFT elevation and right upper quadrant pain  · CT abdomen/pelvis: No acute findings in the chest abdomen or pelvis Transhepatic percutaneous cholecystostomy tube remains in place  Persistent gallbladder inflammatory changes  · INR 2 10 this a m  Plan:  · Surgery consulted, suggested IR evaluation  · IR consult planning for tube check, discussed and they intend to do to change once INR drops to approximately 1 5  · Vitamin K given on 11/09/2022  · GI was consulted regarding the possibility of acute cholangitis  No plan for ERCP or MRCP at this time  · Continue IV antibiotics with ceftriaxone and flagyl  Patient had had symptomatic improvement  · Continue to monitor CMP  · NPO after midnight for tube change in the morning

## 2022-11-10 NOTE — ASSESSMENT & PLAN NOTE
· S/p IVC filter   · Resuming warfarin as above, with heparin bridge  · Patient ordered for Doppler of lower extremities for lower extremity pain  ·   Unrevealing for lower extremities and right  Upper extremity, demonstrated left upper extremity thrombophlebitis  ·   Ordered warm compresses for thrombophlebitis

## 2022-11-10 NOTE — ASSESSMENT & PLAN NOTE
· Patient has a history of acute cholecystitis s/p percutaneous cholecystostomy tube  · Now presenting with LFT elevation and right upper quadrant pain  · CT abdomen/pelvis: No acute findings in the chest abdomen or pelvis Transhepatic percutaneous cholecystostomy tube remains in place  Persistent gallbladder inflammatory changes  · INR 1 63 this a m  Plan:  · Surgery consulted, suggested IR evaluation  · Cholecystostomy tube has been replaced and upsized by IR today 11/10/22  · GI was consulted regarding the possibility of acute cholangitis  No plan for ERCP or MRCP at this time  · Continue IV antibiotics with ceftriaxone and flagyl  Patient had had symptomatic improvement    · Continue to monitor CMP

## 2022-11-10 NOTE — DISCHARGE INSTRUCTIONS
TUBE CARE INSTRUCTIONS  Any questions or problems with your tube, please contact Interventional Radiology  [phone]    Care after your procedure:    Resume your normal diet  Small sips of flat soda will help with nausea  1  The properly functioning catheter should be forward flushed twice (2x) daily with 10ml of normal saline using clean technique  You will be given a prescription for flushes  To flush the tube, clean both connections with alcohol swab  Twist off the drainage bag/ bulb  tubing and twist the saline syringe into the drainage tube and flush  Remove the syringe and twist the drainage bag / bulb tubing tubing back on     2  The drainage bag/bulb may be emptied as necessary  Keep a record of the amount of fluid you drain from your tube  This should be done with clean technique as well  3  A fresh dressing should be applied daily over the tube insertion site  4  As the tube is secured to the skin with only a suture,try not to pull on your tube  Tub baths are not permitted  Showers are permitted if the patient's skin entry site is prevented from getting wet  Similarly, washcloth "baths" are acceptable  Contact Interventional Radiology at [phone] Dennys PATIENTS: Contact Interventional Radiology at [phone]) Conrad De Dios PATIENTS: Contact Interventional Radiology at [phone]) if:    1  Leakage or large amounts of liquid around the catheter  2  Fever of 101 degrees lasting several hours without other obvious cause (such as sore throat, flu, etc)  3  Persistent nausea or vomiting  4  Diminished drainage, which may be associated with pressure or pain  Or when the     drainage from your tube is less than 10mls for 48 hours  5  Catheter pulled back or falls out  The following pharmacies carry the flush syringes         [address] [facility] [address]                             [facility] [place] [address] [address] [facility]                         [phone]  [place] [address]  [place]  Phone [phone]            Phone Cheryle                                 [phone]  [facility] Mary GRANADOS                      [address] [place]  Phone [phone]            Phone [phone]                      Jagjit Tena                                                                                                          [phone]  [facility]  [address]  [address] [facility]  Phone [phone]   ___________________________________________________________________________________________________________    Radiology should contact you for an appointment in 2 weeks  Consider calling them at [phone]  The plan is to remove your gallstones through your gallbladder tube  It is thought that the risk of surgery is too great  Radiology will  you in this regard  Please remind the IR team you are  on anticoagulation  You will need to discontinue this whenever a radiology procedure is to be completed    ______________________________________________________________________________________________________________

## 2022-11-10 NOTE — ASSESSMENT & PLAN NOTE
Lab Results   Component Value Date    EGFR 36 11/09/2022    EGFR 27 11/08/2022    EGFR 25 11/07/2022    CREATININE 1 67 (H) 11/09/2022    CREATININE 2 13 (H) 11/08/2022    CREATININE 2 24 (H) 11/07/2022   · Following BMP

## 2022-11-10 NOTE — PROGRESS NOTES
[facility]  Progress Note - Nan Claros 1935, 81 y o  male MRN: [id number]  Unit/Bed#: S -01 Encounter: [id number]  Primary Care Provider: Shaheed Perez DO   Date and time admitted to hospital: 11/4/2022  2:38 PM    * Acute on chronic cholecystitis  Assessment & Plan  · Patient has a history of acute cholecystitis s/p percutaneous cholecystostomy tube  · Now presenting with LFT elevation and right upper quadrant pain  · CT abdomen/pelvis: No acute findings in the chest abdomen or pelvis Transhepatic percutaneous cholecystostomy tube remains in place  Persistent gallbladder inflammatory changes  · INR 1 63 this a m  Plan:  · Surgery consulted, suggested IR evaluation  · Cholecystostomy tube has been replaced and upsized by IR today 11/10/22  · GI was consulted regarding the possibility of acute cholangitis  No plan for ERCP or MRCP at this time  · Continue IV antibiotics with ceftriaxone and flagyl  Patient had had symptomatic improvement  · Continue to monitor CMP    Leg pain  Assessment & Plan   The patient has been complaining of bilateral leg pain, likely secondary to his edema   Versus neuropathic pain  He describes it as a shooting pain, and that is exacerbated when he moves legs  Leg pain is improving with colchicine     -  Increase gabapentin to t i d  100 mg   - Continuing colchicine for suspicion gout affecting bilateral ankles  - initiated compression stockings  Diastolic congestive heart failure [facility])  Assessment & Plan  Wt Readings from Last 3 Encounters:   10/25/22 (!) 137 kg (302 lb)   10/24/22 (!) 138 kg (305 lb 3 2 oz)   09/26/22 (!) 139 kg (307 lb)     ·  not currently diuresing    · Reassess volume status daily with daily weight and I/O         Stage 3a chronic kidney disease with elevated creatinine  Assessment & Plan  Lab Results   Component Value Date    EGFR 51 11/10/2022    EGFR 36 11/09/2022    EGFR 27 11/08/2022    CREATININE 1 26 11/10/2022    CREATININE 1 67 (H) 2022    CREATININE 2 13 (H) 2022   · Following BMP    Paroxysmal atrial fibrillation (HCC)  Assessment & Plan  · Restarting warfarin at 7 5 mg on 11/10/22  · On heparin bridge  · Continue sotalol    Thoracic back pain  Assessment & Plan  · Consider MRI if patient's pain better controlled or increased concern for bone infection  · He did not complain of back pain on 2022 - 11/10/2022    OBINNA (obstructive sleep apnea)  Assessment & Plan  · Continue cpap    Thrombophlebitis  Assessment & Plan  · S/p IVC filter   · Resuming warfarin as above, with heparin bridge  · Patient ordered for Doppler of lower extremities for lower extremity pain  ·   Unrevealing for lower extremities and right  Upper extremity, demonstrated left upper extremity thrombophlebitis  ·   Ordered warm compresses for thrombophlebitis  VTE Pharmacologic Prophylaxis: VTE Score: 3 Moderate Risk (Score 3-4) - Pharmacological DVT Prophylaxis Ordered: heparin drip  Patient Centered Rounds: I performed bedside rounds with nursing staff today  Discussions with Specialists or Other Care Team Provider: IR    Education and Discussions with Family / Patient: Updated  (wife and sister) at bedside  Current Length of Stay: 6 day(s)  Current Patient Status: Inpatient   Discharge Plan: Anticipate discharge in 24-48 hrs to home with home services  Code Status: Level 1 - Full Code    Subjective:   No acute events overnight  He is having no abdominal pain, no N/V, no chest pain, no shortness of breath  He feels that his leg pain has improved overnight, though it can still have the stabbing pain on occasion       Objective:     Vitals:   Temp (24hrs), Av 7 °F (36 5 °C), Min:97 5 °F (36 4 °C), Max:98 °F (36 7 °C)    Temp:  [97 5 °F (36 4 °C)-98 °F (36 7 °C)] 97 7 °F (36 5 °C)  HR:  [46-81] 60  Resp:  [16] 16  BP: (112-143)/(54-68) 112/56  SpO2:  [93 %-99 %] 99 %  Body mass index is 38 8 kg/m²  Input and Output Summary (last 24 hours): Intake/Output Summary (Last 24 hours) at 11/10/2022 1504  Last data filed at 11/10/2022 1457  Gross per 24 hour   Intake 180 ml   Output 58 ml   Net 122 ml       Physical Exam:   Physical Exam  Constitutional:       General: He is not in acute distress  Appearance: He is not toxic-appearing  HENT:      Head: Normocephalic and atraumatic  Nose: Nose normal       Mouth/Throat:      Mouth: Mucous membranes are moist       Pharynx: No oropharyngeal exudate or posterior oropharyngeal erythema  Eyes:      Conjunctiva/sclera: Conjunctivae normal       Pupils: Pupils are equal, round, and reactive to light  Cardiovascular:      Rate and Rhythm: Normal rate and regular rhythm  Pulses: Normal pulses  Heart sounds: Normal heart sounds  Pulmonary:      Breath sounds: Normal breath sounds  No wheezing, rhonchi or rales  Abdominal:      General: Bowel sounds are normal  There is no distension  Palpations: Abdomen is soft  Tenderness: There is no abdominal tenderness  Musculoskeletal:         General: No deformity  Cervical back: Neck supple  Right lower leg: Edema present  Left lower leg: Edema present  Comments: Tenderness on dorsiflexion of the bilateral feet  Tenderness on palpation of bilateral calves  Improved from 11/9/22  Skin:     General: Skin is warm and dry  Comments: Evidence of chronic venous stasis in the lower leg  Neurological:      General: No focal deficit present  Mental Status: He is alert and oriented to person, place, and time  Psychiatric:         Mood and Affect: Mood normal          Behavior: Behavior normal       Comments: Alert and oriented x3            Additional Data:     Labs:  Results from last 7 days   Lab Units 11/10/22  0447 11/06/22  0443 11/05/22  0754   WBC Thousand/uL 7 68   < > 9 25   HEMOGLOBIN g/dL 8 6*   < > 8 3*   HEMATOCRIT % 28 2*   < > 26 2* PLATELETS Thousands/uL 353   < > 277   NEUTROS PCT %  --   --  68   LYMPHS PCT %  --   --  16   MONOS PCT %  --   --  14*   EOS PCT %  --   --  1    < > = values in this interval not displayed  Results from last 7 days   Lab Units 11/10/22  0447   SODIUM mmol/L 139   POTASSIUM mmol/L 3 9   CHLORIDE mmol/L 106   CO2 mmol/L 23   BUN mg/dL 37*   CREATININE mg/dL 1 26   ANION GAP mmol/L 10   CALCIUM mg/dL 8 7   ALBUMIN g/dL 2 9*   TOTAL BILIRUBIN mg/dL 0 58   ALK PHOS U/L 91   ALT U/L 13   AST U/L 20   GLUCOSE RANDOM mg/dL 118     Results from last 7 days   Lab Units 11/10/22  0447   INR  1 63*     Results from last 7 days   Lab Units 11/10/22  1110 11/10/22  0746 11/09/22  2054 11/09/22  1611 11/09/22  1144 11/09/22  0747 11/08/22  2040 11/08/22  1605 11/08/22  1136 11/08/22  0757 11/07/22  2159 11/07/22  1753   POC GLUCOSE mg/dl 144* 122 167* 127 158* 117 158* 130 159* 107 130 109         Results from last 7 days   Lab Units 11/04/22  1535   LACTIC ACID mmol/L 1 7   PROCALCITONIN ng/ml 0 07       Lines/Drains:  Invasive Devices  Report    Peripheral Intravenous Line  Duration           Peripheral IV 11/08/22 Left;Proximal;Ventral (anterior) Forearm 2 days          Drain  Duration           Cholecystostomy Tube <1 day                      Imaging: No pertinent imaging reviewed  Recent Cultures (last 7 days):   Results from last 7 days   Lab Units 11/04/22  1535   BLOOD CULTURE  No Growth After 5 Days  No Growth After 5 Days         Last 24 Hours Medication List:   Current Facility-Administered Medications   Medication Dose Route Frequency Provider Last Rate   • acetaminophen  650 mg Oral Q6H [address] Anju Pollock DO     • bisacodyl  10 mg Rectal Daily PRN Anju Pollock DO     • cefTRIAXone  2,000 mg Intravenous Q24H Anju Pollock DO 2,000 mg (11/09/22 1701)   • colchicine  0 6 mg Oral Daily Anju Pollock DO     • Diclofenac Sodium  2 g Topical 4x Daily Anju Pollock DO     • gabapentin  100 mg Oral TID Dajuan Bhandari Alyce Jaquez MD     • heparin (porcine)  3-20 Units/kg/hr (Order-Specific) Intravenous Titrated Sabas Nava MD 11 1 Units/kg/hr (11/10/22 1141)   • heparin (porcine)  2,000 Units Intravenous Q1H PRN Sabas Nava MD     • heparin (porcine)  4,000 Units Intravenous Q1H PRN Sabas Nava MD     • HYDROmorphone  0 2 mg Intravenous Q4H PRN Janessa Birmingham DO     • insulin lispro  2-12 Units Subcutaneous 4x Daily (AC & HS) Gema Lance DO     • metroNIDAZOLE  500 mg Intravenous Q8H Janessa Birmingham  mg (11/10/22 1130)   • oxyCODONE  2 5 mg Oral Q6H PRN Shayan Hylton DO     • oxyCODONE  5 mg Oral Q6H PRN Shayan Hylton DO     • pantoprazole  40 mg Oral Early Morning Janessa Birmingham DO     • polyethylene glycol  17 g Oral Daily PRN Janessa Birmingham DO     • senna-docusate sodium  1 tablet Oral BID Dev Spain MD     • sodium chloride (PF)  5 mL Intracatheter Daily Sabas Nava MD     • sotalol  80 mg Oral Daily Janessa Birmingham DO     • warfarin  7 5 mg Oral Daily (warfarin) Janessa Birmingham DO          Today, Patient Was Seen By: Sabas Nava    **Please Note: This note may have been constructed using a voice recognition system  **

## 2022-11-10 NOTE — ASSESSMENT & PLAN NOTE
· Consider MRI if patient's pain better controlled or increased concern for bone infection  · He did not complain of back pain on 11/05/2022 - 11/09/2022

## 2022-11-10 NOTE — BRIEF OP NOTE (RAD/CATH)
INTERVENTIONAL RADIOLOGY PROCEDURE NOTE    Date: 11/10/2022    Procedure:   Cholecystostomy tube exchange    Procedure Summary     Date:  Room / Location:     Anesthesia Start:  Anesthesia Stop:     Procedure:  Diagnosis:     Scheduled Providers:  Responsible Provider:     Anesthesia Type: Not recorded ASA Status: Not recorded          Preoperative diagnosis:   1  Weakness    2  Altered mental status    3  Cholecystitis    4  Cholecystostomy tube dysfunction    5  RUQ pain    6  Cholangitis    7  Dysphagia    8  Acute on chronic cholecystitis         Postoperative diagnosis: Same  Surgeon: Leti Haley MD     Assistant: None  No qualified resident was available  Blood loss: None    Specimens: None     Findings: Cholelithiasis present with cystic duct not visualized  A 12 Danish sheath was placed and the hub cut and the gallbladder was irrigated copiously, yielding sludge and numerous small stones  A new 14 Danish cholecystostomy tube was placed  Contrast injection showed the presence of innumerable cholelithiasis remaining within the gallbladder  Complications: None immediate      Anesthesia: conscious sedation and local     Recommendations:  - Maintain bag drainage of the catheter  - Flush daily with 10 mL saline  - Patient should be seen in IR clinic to discuss cholangioscopy for further removal of gallbladder stones, no tube exchange will be scheduled at this time

## 2022-11-10 NOTE — ASSESSMENT & PLAN NOTE
The patient has been complaining of bilateral leg pain, likely secondary to his edema   Versus neuropathic pain  He describes it as a shooting pain, and that is exacerbated when he moves legs  -  Increase gabapentin to t i d  100 mg   - initiate colchicine for suspicion gout affecting bilateral ankles  - initiated compression stockings

## 2022-11-10 NOTE — PHYSICAL THERAPY NOTE
PHYSICAL THERAPY NOTE          Patient Name: Mona Duggan  PHNWR'C Date: 11/10/2022         11/10/22 1455   PT Last Visit   PT Visit Date 11/10/22   Note Type   Note Type Treatment   Pain Assessment   Pain Assessment Tool 0-10   Pain Score No Pain   Pain Location/Orientation Orientation: Left; Location: Leg   Effect of Pain on Daily Activities limited actyivity tolerance with increased pain   Patient's Stated Pain Goal No pain   Hospital Pain Intervention(s) Repositioned; Ambulation/increased activity; Emotional support; Rest   Multiple Pain Sites No   Restrictions/Precautions   Weight Bearing Precautions Per Order No   Other Precautions Chair Alarm; Bed Alarm; Fall Risk;Pain   General   Chart Reviewed Yes   Response to Previous Treatment Patient with no complaints from previous session  Family/Caregiver Present No   Cognition   Overall Cognitive Status WFL   Arousal/Participation Alert; Responsive; Cooperative   Attention Within functional limits   Orientation Level Oriented X4   Memory Within functional limits   Following Commands Follows all commands and directions without difficulty   Comments pt was pleasant and cooperative throughout tx session   Subjective   Subjective pt was agreeable to participate in PT intervention   Bed Mobility   Supine to Sit 4  Minimal assistance   Additional items Assist x 1;HOB elevated; Bedrails; Increased time required;Verbal cues; Other  (HHA)   Sit to Supine Unable to assess   Additional Comments pt was seated on commode post tx session with call bell in hand and all pt needs met   Transfers   Sit to Stand 3  Moderate assistance   Additional items Assist x 1; Increased time required;Verbal cues   Stand to Sit 3  Moderate assistance   Additional items Assist x 1; Armrests; Increased time required;Verbal cues   Stand pivot 3  Moderate assistance   Additional items Assist x 1; Armrests; Increased time required;Verbal cues   Toilet transfer 4  Minimal assistance   Additional items Assist x 1; Armrests; Increased time required;Verbal cues; Commode   Additional Comments pt performed muliple STS and SPT in [facility] tx session pt initially was mod Ax1 then became min Ax1 for completing SPT to commode from recliner   Ambulation/Elevation   Gait pattern Decreased foot clearance; Short stride; Excessively slow; Shuffling   Gait Assistance 3  Moderate assist   Additional items Assist x 1;Verbal cues; Tactile cues   Assistive Device Bariatric Rolling walker   Distance 3'x1 BRW   Balance   Static Sitting Fair +   Dynamic Sitting Fair   Static Standing Poor +   Ambulatory Poor   Endurance Deficit   Endurance Deficit Yes   Endurance Deficit Description limited activity tolerance   Activity Tolerance   Activity Tolerance Patient limited by fatigue   Nurse Made Aware Spoke to RN   Exercises   Hip Abduction Sitting;15 reps;AROM; Bilateral   Hip Adduction Sitting;15 reps;AROM; Bilateral  (pillow squeezes)   Knee AROM Long Arc Quad Sitting;15 reps;AROM; Bilateral   Ankle Pumps Sitting;20 reps;AROM; Bilateral   Assessment   Problem List Decreased strength;Decreased endurance; Impaired balance;Decreased mobility;Pain; Impaired sensation   Assessment pt began tx session lying supine in the bed and was agreeable to participate in PT intervention  progress was noted th tx session as pt was able to complete a supine<>sit EOB transfer with a decrease in level of assistance required min Ax1 w/ HHA to complete transfer to EOB  pt was able to sit EOB and increase static/dynamic sitting balance by performing TE activities w/o LOB for 5 minutes  pt levele of assistance varied in [facility] tx session for all functional transfers to and from [place] [address] with VC's for hand placement  initially all transfers were mod Ax1 then pt completed 1 STS and 1 SPT with min Ax1 and VC's for hand placement   pt does continue to be limited with activity tolerance and functional mobility as pt was only able to ambulate 3' fwd before requesting to sit in recliner  Continue to recommend post acute rehab services at the time of D/C in order to maximize pt functional independence and safety with all OOB mobility as appropriate  post tx pt on commode with call bell and RN made aware   Goals   Patient Goals to be more active and get OOB   STG Expiration Date 11/17/22   PT Treatment Day 2   Plan   Treatment/Interventions Functional transfer training;LE strengthening/ROM; Therapeutic exercise; Endurance training;Patient/family training;Equipment eval/education; Bed mobility;Gait training;Spoke to nursing   Progress Slow progress, decreased activity tolerance   PT Frequency 3-5x/wk   Recommendation   PT Discharge Recommendation Post acute rehabilitation services   Equipment Recommended [address] Mobility Inpatient   Turning in Bed Without Bedrails 3   Lying on Back to Sitting on Edge of Flat Bed 3   Moving Bed to Chair 2   Standing Up From Chair 2   Walk in Room 2   Climb 3-5 Stairs 1   Basic Mobility Inpatient Raw Score 13   Basic Mobility Standardized Score 33 99   Turning Head Towards Sound 4   Follow Simple Instructions 4   Low Function Basic Mobility Raw Score 21   Low Function Basic Mobility Standardized Score 34 39   Highest Level Of Mobility   -[facility] Goal 4: Move to chair/commode   -HL Achieved 4: Move to [facility] Financial Provided Mobility training;Assistive device; Other  (functional transfers)   Patient Demonstrates acceptance/verbal understanding   End of Consult   Patient Position at End of Consult Other (comment)  (commode)   The patient's AM-PAC Basic Mobility Inpatient Short Form Raw Score is 13  A Raw score of less than or equal to 16 suggests the patient may benefit from discharge to post-acute rehabilitation services  Please also refer to the recommendation of the Physical Therapist for safe discharge planning       [facility]

## 2022-11-10 NOTE — CASE MANAGEMENT
Case Management Discharge Planning Note    Patient name Erick Siddiqui  Location S /S -00 MRN [id number]  : 1935 Date 11/10/2022       Current Admission Date: 2022  Current Admission Diagnosis:Acute on chronic cholecystitis   Patient Active Problem List    Diagnosis Date Noted   • Leg pain 2022   • Thoracic back pain 2022   • Acute on chronic cholecystitis 2022   • Bacteremia 10/23/2022   • Acute cholecystitis 2022   • Benign prostatic hyperplasia with lower urinary tract symptoms 2022   • OBINNA (obstructive sleep apnea)    • Stage 3a chronic kidney disease with elevated creatinine 2022   • Acute kidney injury ([address] ) 2021   • Osteoarthritis, knee 11/15/2021   • Status post right knee replacement 11/15/2021   • Mixed hyperlipidemia 2021   • Swelling of limb 2021   • Chronic deep vein thrombosis (DVT) ([facility] ) 09/10/2020   • Secondary lymphedema 2018   • Venous ulcer of ankle, right ([facility] [facility] ) 2018   • S/P AVR 2018   • Thrombophlebitis 2018   • Presence of implantable cardioverter-defibrillator (ICD) 2018   • Hematuria, gross 2017   • Chronic bilateral low back pain without sciatica 2017   • Compression fracture of L4 lumbar vertebra 2017   • BPH (benign prostatic hyperplasia) 2017   • Obesity, unspecified obesity severity, unspecified obesity type 2017   • Hydrocele 2017   • Chronic anticoagulation 2017   • Anemia 2017   • Renal cyst 2017   • Urinary tract infection, acute 2017   • Epididymitis 2017   • Cellulitis of scrotum 2017   • Facial cellulitis 2016   • Dental abscess 2016   • 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 2015   • Ventricular tachycardia 04/15/2014   • Paroxysmal atrial fibrillation ([facility] ) 2014   • Edema    • Diastolic congestive heart failure ([address] ) 2014   • Endocarditis 03/19/2014   • Left knee pain 03/14/2014   • Other disorder of calcium metabolism 03/14/2014   • Type 2 diabetes mellitus with diabetic neuropathy, without long-term current use of insulin ([facility] ) 08/19/2013   • Peripheral venous insufficiency 08/19/2013   • Cardiomegaly 08/19/2013   • PVD (peripheral vascular disease) ([facility] [address] ) 04/19/2013   • Dyslipidemia 04/19/2013      LOS (days): 6  Geometric Mean LOS (GMLOS) (days): 4 40  Days to GMLOS:-1 3     OBJECTIVE:  Risk of Unplanned Readmission Score: 25 36         Current admission status: Inpatient   Preferred Pharmacy:   [address] [facility] [address]  Phone: [phone] Fax: Patricia (ALEX) [facility], [address] \[facility] [place] [facility]\"" [address]  [address] [place] [address]  Phone: [phone] Fax: [phone]    Primary Care Provider: Mohan Cole DO    Primary Insurance: MEDICARE  Secondary Insurance: [facility] HEALTH OPTIONS PROGRAM    DISCHARGE DETAILS:    Contacts  Patient Contacts: Patient and spouse-Paloma  Relationship to Patient[de-identified] Family  Contact Method: In Person  Reason/Outcome: Continuity of Care, Discharge Planning    Other Referral/Resources/Interventions Provided:  Interventions: [facility]  Referral Comments: CM met with pt and spouse to confirm [facility] accepted  Per both patient and spouse, still declining IP rehab  Family was hoping to see PT yesterday, but was not  CM informed spouse that Cm will reach out to PT to see if they are able to see pt this afternoon pending stability as his tube was exchanged this morning    CM informed PTA-Isaac and OT-Paul

## 2022-11-10 NOTE — PLAN OF CARE
Problem: PHYSICAL THERAPY ADULT  Goal: Performs mobility at highest level of function for planned discharge setting  See evaluation for individualized goals  Description: Treatment/Interventions: Functional transfer training, LE strengthening/ROM, Therapeutic exercise, Endurance training, Patient/family training, Equipment eval/education, Bed mobility, Gait training  Equipment Recommended: Ankita Sandoval       See flowsheet documentation for full assessment, interventions and recommendations  Note:    Problem List: Decreased strength, Decreased endurance, Impaired balance, Decreased mobility, Pain, Impaired sensation  Assessment: pt began tx session lying supine in the bed and was agreeable to participate in PT intervention  progress was noted thsi tx session as pt was able to complete a supine<>sit EOB transfer with a decrease in level of assistance required min Ax1 w/ HHA to complete transfer to EOB  pt was able to sit EOB and increase static/dynamic sitting balance by performing TE activities w/o LOB for 5 minutes  pt levele of assistance varied in todays tx session for all functional transfers to and from [facility] with VC's for hand placement  initially all transfers were mod Ax1 then pt completed 1 STS and 1 SPT with min Ax1 and VC's for hand placement  pt does continue to be limited with activity tolerance and functional mobility as pt was only able to ambulate 3' fwd before requesting to sit in recliner  Continue to recommend post acute rehab services at the time of D/C in order to maximize pt functional independence and safety with all OOB mobility as appropriate  post tx pt on commode with call bell and RN made aware        PT Discharge Recommendation: Post acute rehabilitation services    See flowsheet documentation for full assessment

## 2022-11-10 NOTE — ASSESSMENT & PLAN NOTE
What Is The Reason For Today's Visit?: Full Body Skin Examination · Consider MRI if patient's pain better controlled or increased concern for bone infection  · He did not complain of back pain on 11/05/2022 - 11/10/2022

## 2022-11-10 NOTE — PLAN OF CARE
Problem: Potential for Falls  Goal: Patient will remain free of falls  Description: INTERVENTIONS:  - Educate patient/family on patient safety including physical limitations  - Instruct patient to call for assistance with activity   - Consult OT/PT to assist with strengthening/mobility   - Keep Call bell within reach  - Keep bed low and locked with side rails adjusted as appropriate  - Keep care items and personal belongings within reach  - Initiate and maintain comfort rounds  - Make Fall Risk Sign visible to staff  - Offer Toileting every 2 Hours, in advance of need  - Initiate/Maintain bed alarm  - Obtain necessary fall risk management equipment: bed alarm  - Apply yellow socks and bracelet for high fall risk patients  - Consider moving patient to room near nurses station  Outcome: Progressing     Problem: Prexisting or High Potential for Compromised Skin Integrity  Goal: Skin integrity is maintained or improved  Description: INTERVENTIONS:  - Identify patients at risk for skin breakdown  - Assess and monitor skin integrity  - Assess and monitor nutrition and hydration status  - Monitor labs   - Assess for incontinence   - Turn and reposition patient  - Assist with mobility/ambulation  - Relieve pressure over bony prominences  - Avoid friction and shearing  - Provide appropriate hygiene as needed including keeping skin clean and dry  - Evaluate need for skin moisturizer/barrier cream  - Collaborate with interdisciplinary team   - Patient/family teaching  - Consider wound care consult   Outcome: Progressing     Problem: MOBILITY - ADULT  Goal: Maintain or return to baseline ADL function  Description: INTERVENTIONS:  -  Assess patient's ability to carry out ADLs; assess patient's baseline for ADL function and identify physical deficits which impact ability to perform ADLs (bathing, care of mouth/teeth, toileting, grooming, dressing, etc )  - Assess/evaluate cause of self-care deficits   - Assess range of motion  - Assess patient's mobility; develop plan if impaired  - Assess patient's need for assistive devices and provide as appropriate  - Encourage maximum independence but intervene and supervise when necessary  - Involve family in performance of ADLs  - Assess for home care needs following discharge   - Consider OT consult to assist with ADL evaluation and planning for discharge  - Provide patient education as appropriate  Outcome: Progressing  Goal: Maintains/Returns to pre admission functional level  Description: INTERVENTIONS:  - Perform BMAT or MOVE assessment daily    - Set and communicate daily mobility goal to care team and patient/family/caregiver  - Collaborate with rehabilitation services on mobility goals if consulted  - Perform Range of Motion 2 times a day  - Reposition patient every 2 hours    - Dangle patient 2 times a day  - Stand patient 2 times a day  - Ambulate patient 3 times a day  - Out of bed to chair 3 times a day   - Out of bed for meals 3 times a day  - Out of bed for toileting  - Record patient progress and toleration of activity level   Outcome: Progressing     Problem: HEMATOLOGIC - ADULT  Goal: Maintains hematologic stability  Description: INTERVENTIONS  - Assess for signs and symptoms of bleeding or hemorrhage  - Monitor labs  - Administer supportive blood products/factors as ordered and appropriate  Outcome: Progressing

## 2022-11-11 LAB
ALBUMIN SERPL BCP-MCNC: 3 G/DL (ref 3.5–5)
ALP SERPL-CCNC: 91 U/L (ref 34–104)
ALT SERPL W P-5'-P-CCNC: 12 U/L (ref 7–52)
ANION GAP SERPL CALCULATED.3IONS-SCNC: 10 MMOL/L (ref 4–13)
APTT PPP: 60 SECONDS (ref 23–37)
APTT PPP: 68 SECONDS (ref 23–37)
AST SERPL W P-5'-P-CCNC: 21 U/L (ref 13–39)
BILIRUB SERPL-MCNC: 0.62 MG/DL (ref 0.2–1)
BUN SERPL-MCNC: 28 MG/DL (ref 5–25)
CALCIUM ALBUM COR SERPL-MCNC: 10 MG/DL (ref 8.3–10.1)
CALCIUM SERPL-MCNC: 9.2 MG/DL (ref 8.4–10.2)
CHLORIDE SERPL-SCNC: 106 MMOL/L (ref 96–108)
CO2 SERPL-SCNC: 24 MMOL/L (ref 21–32)
CREAT SERPL-MCNC: 1.1 MG/DL (ref 0.6–1.3)
ERYTHROCYTE [DISTWIDTH] IN BLOOD BY AUTOMATED COUNT: 18.6 % (ref 11.6–15.1)
GFR SERPL CREATININE-BSD FRML MDRD: 60 ML/MIN/1.73SQ M
GLUCOSE SERPL-MCNC: 112 MG/DL (ref 65–140)
GLUCOSE SERPL-MCNC: 121 MG/DL (ref 65–140)
GLUCOSE SERPL-MCNC: 137 MG/DL (ref 65–140)
GLUCOSE SERPL-MCNC: 145 MG/DL (ref 65–140)
GLUCOSE SERPL-MCNC: 145 MG/DL (ref 65–140)
HCT VFR BLD AUTO: 28.7 % (ref 36.5–49.3)
HGB BLD-MCNC: 8.6 G/DL (ref 12–17)
INR PPP: 1.5 (ref 0.84–1.19)
MCH RBC QN AUTO: 26.9 PG (ref 26.8–34.3)
MCHC RBC AUTO-ENTMCNC: 30 G/DL (ref 31.4–37.4)
MCV RBC AUTO: 90 FL (ref 82–98)
PLATELET # BLD AUTO: 345 THOUSANDS/UL (ref 149–390)
PMV BLD AUTO: 9.2 FL (ref 8.9–12.7)
POTASSIUM SERPL-SCNC: 3.8 MMOL/L (ref 3.5–5.3)
PROT SERPL-MCNC: 6.7 G/DL (ref 6.4–8.4)
PROTHROMBIN TIME: 18.4 SECONDS (ref 11.6–14.5)
RBC # BLD AUTO: 3.2 MILLION/UL (ref 3.88–5.62)
SODIUM SERPL-SCNC: 140 MMOL/L (ref 135–147)
WBC # BLD AUTO: 6.14 THOUSAND/UL (ref 4.31–10.16)

## 2022-11-11 PROCEDURE — 82948 REAGENT STRIP/BLOOD GLUCOSE: CPT

## 2022-11-11 PROCEDURE — 99232 SBSQ HOSP IP/OBS MODERATE 35: CPT | Performed by: INTERNAL MEDICINE

## 2022-11-11 PROCEDURE — 80053 COMPREHEN METABOLIC PANEL: CPT | Performed by: INTERNAL MEDICINE

## 2022-11-11 PROCEDURE — 85027 COMPLETE CBC AUTOMATED: CPT | Performed by: INTERNAL MEDICINE

## 2022-11-11 PROCEDURE — 85730 THROMBOPLASTIN TIME PARTIAL: CPT | Performed by: INTERNAL MEDICINE

## 2022-11-11 PROCEDURE — 85610 PROTHROMBIN TIME: CPT | Performed by: INTERNAL MEDICINE

## 2022-11-11 RX ORDER — DICYCLOMINE HYDROCHLORIDE 10 MG/1
10 CAPSULE ORAL 3 TIMES DAILY PRN
Status: DISCONTINUED | OUTPATIENT
Start: 2022-11-11 | End: 2022-11-14 | Stop reason: HOSPADM

## 2022-11-11 RX ORDER — MAGNESIUM HYDROXIDE/ALUMINUM HYDROXICE/SIMETHICONE 120; 1200; 1200 MG/30ML; MG/30ML; MG/30ML
30 SUSPENSION ORAL ONCE
Status: COMPLETED | OUTPATIENT
Start: 2022-11-11 | End: 2022-11-11

## 2022-11-11 RX ORDER — POTASSIUM CHLORIDE 20 MEQ/1
20 TABLET, EXTENDED RELEASE ORAL ONCE
Status: COMPLETED | OUTPATIENT
Start: 2022-11-11 | End: 2022-11-11

## 2022-11-11 RX ADMIN — DICYCLOMINE HYDROCHLORIDE 10 MG: 10 CAPSULE ORAL at 16:35

## 2022-11-11 RX ADMIN — ACETAMINOPHEN 650 MG: 325 TABLET, FILM COATED ORAL at 06:10

## 2022-11-11 RX ADMIN — METRONIDAZOLE 500 MG: 5 INJECTION, SOLUTION INTRAVENOUS at 04:53

## 2022-11-11 RX ADMIN — PANTOPRAZOLE SODIUM 40 MG: 40 TABLET, DELAYED RELEASE ORAL at 06:10

## 2022-11-11 RX ADMIN — ALUMINUM HYDROXIDE, MAGNESIUM HYDROXIDE, AND SIMETHICONE 30 ML: 200; 200; 20 SUSPENSION ORAL at 22:24

## 2022-11-11 RX ADMIN — HYDROMORPHONE HYDROCHLORIDE 0.2 MG: 0.2 INJECTION, SOLUTION INTRAMUSCULAR; INTRAVENOUS; SUBCUTANEOUS at 01:42

## 2022-11-11 RX ADMIN — POTASSIUM CHLORIDE 20 MEQ: 1500 TABLET, EXTENDED RELEASE ORAL at 11:05

## 2022-11-11 RX ADMIN — HEPARIN SODIUM 13.1 UNITS/KG/HR: 10000 INJECTION, SOLUTION INTRAVENOUS at 09:21

## 2022-11-11 RX ADMIN — WARFARIN SODIUM 7.5 MG: 7.5 TABLET ORAL at 16:35

## 2022-11-11 RX ADMIN — GABAPENTIN 100 MG: 100 CAPSULE ORAL at 08:53

## 2022-11-11 RX ADMIN — GABAPENTIN 100 MG: 100 CAPSULE ORAL at 20:37

## 2022-11-11 RX ADMIN — TRIMETHOBENZAMIDE HYDROCHLORIDE 200 MG: 100 INJECTION INTRAMUSCULAR at 23:00

## 2022-11-11 RX ADMIN — COLCHICINE 0.6 MG: 0.6 TABLET ORAL at 08:53

## 2022-11-11 RX ADMIN — POLYETHYLENE GLYCOL 3350 17 G: 17 POWDER, FOR SOLUTION ORAL at 08:53

## 2022-11-11 RX ADMIN — SENNOSIDES AND DOCUSATE SODIUM 1 TABLET: 8.6; 5 TABLET ORAL at 08:53

## 2022-11-11 RX ADMIN — OXYCODONE HYDROCHLORIDE 5 MG: 5 TABLET ORAL at 06:12

## 2022-11-11 RX ADMIN — ACETAMINOPHEN 650 MG: 325 TABLET, FILM COATED ORAL at 16:35

## 2022-11-11 RX ADMIN — SODIUM CHLORIDE 5 ML: 9 INJECTION INTRAMUSCULAR; INTRAVENOUS; SUBCUTANEOUS at 08:55

## 2022-11-11 RX ADMIN — GABAPENTIN 100 MG: 100 CAPSULE ORAL at 16:35

## 2022-11-11 RX ADMIN — SOTALOL HYDROCHLORIDE 80 MG: 80 TABLET ORAL at 08:53

## 2022-11-11 RX ADMIN — DICLOFENAC SODIUM TOPICAL GEL, 1%, 2 G: 10 GEL TOPICAL at 08:53

## 2022-11-11 RX ADMIN — OXYCODONE HYDROCHLORIDE 2.5 MG: 5 TABLET ORAL at 20:37

## 2022-11-11 NOTE — ASSESSMENT & PLAN NOTE
Lab Results   Component Value Date    EGFR 60 11/11/2022    EGFR 51 11/10/2022    EGFR 36 11/09/2022    CREATININE 1 10 11/11/2022    CREATININE 1 26 11/10/2022    CREATININE 1 67 (H) 11/09/2022   · Following BMP

## 2022-11-11 NOTE — ASSESSMENT & PLAN NOTE
· Consider MRI if patient's pain better controlled or increased concern for bone infection  · He did not complain of back pain on 11/05/2022 - 11/10/2022

## 2022-11-11 NOTE — PROGRESS NOTES
[facility]  Progress Note - Scarlett Ceja 1935, 81 y o  male MRN: [id number]  Unit/Bed#: S -01 Encounter: [id number]  Primary Care Provider: Chuck Pena DO   Date and time admitted to hospital: 11/4/2022  2:38 PM    * Acute on chronic cholecystitis  Assessment & Plan  · Patient has a history of acute cholecystitis s/p percutaneous cholecystostomy tube  · Now presenting with LFT elevation and right upper quadrant pain  · CT abdomen/pelvis: No acute findings in the chest abdomen or pelvis Transhepatic percutaneous cholecystostomy tube remains in place  Persistent gallbladder inflammatory changes  · INR 1 50 this a m  -->  Awaiting INR to be between 2 and 3 for therapeutic effect  Plan:  · Surgery consulted, suggested IR evaluation  · Cholecystostomy tube has been replaced and upsized by IR 11/10/22  ·   Small amounts of bloody discharge present in the   Cholecystostomy bag  ·  stopped IV antibiotics  Patient had had symptomatic improvement  · Continue to monitor CMP    Leg pain  Assessment & Plan   The patient has been complaining of bilateral leg pain, likely secondary to his edema   Versus neuropathic pain  He describes it as a shooting pain, and that is exacerbated when he moves legs  Leg pain is improving with colchicine     -  Increase gabapentin to t i d  100 mg   - Continuing colchicine for suspicion gout affecting bilateral ankles  - initiated compression stockings  Diastolic congestive heart failure [facility])  Assessment & Plan  Wt Readings from Last 3 Encounters:   10/25/22 (!) 137 kg (302 lb)   10/24/22 (!) 138 kg (305 lb 3 2 oz)   09/26/22 (!) 139 kg (307 lb)     ·  not currently diuresing    · Reassess volume status daily with daily weight and I/O         Stage 3a chronic kidney disease with elevated creatinine  Assessment & Plan  Lab Results   Component Value Date    EGFR 60 11/11/2022    EGFR 51 11/10/2022    EGFR 36 11/09/2022    CREATININE 1 10 11/11/2022    CREATININE 1 26 11/10/2022    CREATININE 1 67 (H) 11/09/2022   · Following BMP    Paroxysmal atrial fibrillation (HCC)  Assessment & Plan  · Restarting warfarin at 7 5 mg on 11/10/22  · On heparin bridge  · Continue sotalol    Thoracic back pain  Assessment & Plan  · Consider MRI if patient's pain better controlled or increased concern for bone infection  · He did not complain of back pain on 11/05/2022 - 11/10/2022    OBINNA (obstructive sleep apnea)  Assessment & Plan  · Continue cpap    Thrombophlebitis  Assessment & Plan  · S/p IVC filter   · Resuming warfarin as above, with heparin bridge  · Patient ordered for Doppler of lower extremities for lower extremity pain  ·   Unrevealing for lower extremities and right  Upper extremity, demonstrated left upper extremity thrombophlebitis  ·   Ordered warm compresses for thrombophlebitis  VTE Pharmacologic Prophylaxis: VTE Score: 3 Moderate Risk (Score 3-4) - Pharmacological DVT Prophylaxis Ordered: heparin drip  Patient Centered Rounds: I performed bedside rounds with nursing staff today  Discussions with Specialists or Other Care Team Provider:  none    Education and Discussions with Family / Patient: Updated  (wife) at bedside  Current Length of Stay: 7 day(s)  Current Patient Status: Inpatient   Discharge Plan: Anticipate discharge in 48-72 hrs to home with home services  Code Status: Level 1 - Full Code    Subjective:    no acute events overnight  The patient does not complain of any shortness of breath, chest pain, abdominal pain, nausea, or vomiting  He says that he had 2 bowel movements this morning  These are accompanied by some cramping sensations  Later this afternoon around lunchtime, he had vomiting after eating his lunch  This did not have any symptoms prior or after, such as no nausea preceding or following the event    He did state that about an hour after this vomiting, he had some abdominal cramping, which was treated with dicyclomine  He  Noted that the pain is like seems to be improved even from yesterday  Objective:     Vitals:   Temp (24hrs), Av 1 °F (36 7 °C), Min:97 8 °F (36 6 °C), Max:99 °F (37 2 °C)    Temp:  [97 8 °F (36 6 °C)-99 °F (37 2 °C)] 97 8 °F (36 6 °C)  HR:  [69-79] 79  Resp:  [20] 20  BP: (111-141)/(60-70) 141/70  SpO2:  [93 %-96 %] 93 %  Body mass index is 38 8 kg/m²  Input and Output Summary (last 24 hours): Intake/Output Summary (Last 24 hours) at 2022 1748  Last data filed at 2022 1301  Gross per 24 hour   Intake 465 ml   Output 410 ml   Net 55 ml       Physical Exam:   Physical Exam  Constitutional:       General: He is not in acute distress  Appearance: He is not toxic-appearing  HENT:      Head: Normocephalic and atraumatic  Nose: Nose normal       Mouth/Throat:      Mouth: Mucous membranes are moist       Pharynx: No oropharyngeal exudate or posterior oropharyngeal erythema  Eyes:      Conjunctiva/sclera: Conjunctivae normal       Pupils: Pupils are equal, round, and reactive to light  Cardiovascular:      Rate and Rhythm: Normal rate and regular rhythm  Pulses: Normal pulses  Heart sounds: Normal heart sounds  Pulmonary:      Breath sounds: Normal breath sounds  No wheezing, rhonchi or rales  Abdominal:      General: Bowel sounds are normal  There is no distension  Palpations: Abdomen is soft  Tenderness: There is no abdominal tenderness  Musculoskeletal:         General: No deformity  Cervical back: Neck supple  Right lower leg: Edema present  Left lower leg: Edema present  Comments: Almost completely resolved tenderness on dorsiflexion of the bilateral feet, no pain with plantar flexion, no pain with pressing on bilateral calves  Skin:     General: Skin is warm and dry  Comments: Evidence of chronic venous stasis in the lower leg  Neurological:      General: No focal deficit present  Mental Status: He is alert and oriented to person, place, and time  Psychiatric:         Mood and Affect: Mood normal          Behavior: Behavior normal       Comments: Alert and oriented x3  Additional Data:     Labs:  Results from last 7 days   Lab Units 11/11/22  0459 11/06/22  0443 11/05/22  0754   WBC Thousand/uL 6 14   < > 9 25   HEMOGLOBIN g/dL 8 6*   < > 8 3*   HEMATOCRIT % 28 7*   < > 26 2*   PLATELETS Thousands/uL 345   < > 277   NEUTROS PCT %  --   --  68   LYMPHS PCT %  --   --  16   MONOS PCT %  --   --  14*   EOS PCT %  --   --  1    < > = values in this interval not displayed  Results from last 7 days   Lab Units 11/11/22  0459   SODIUM mmol/L 140   POTASSIUM mmol/L 3 8   CHLORIDE mmol/L 106   CO2 mmol/L 24   BUN mg/dL 28*   CREATININE mg/dL 1 10   ANION GAP mmol/L 10   CALCIUM mg/dL 9 2   ALBUMIN g/dL 3 0*   TOTAL BILIRUBIN mg/dL 0 62   ALK PHOS U/L 91   ALT U/L 12   AST U/L 21   GLUCOSE RANDOM mg/dL 112     Results from last 7 days   Lab Units 11/11/22  0459   INR  1 50*     Results from last 7 days   Lab Units 11/11/22  1558 11/11/22  1141 11/11/22  0736 11/10/22  2149 11/10/22  1603 11/10/22  1110 11/10/22  0746 11/09/22  2054 11/09/22  1611 11/09/22  1144 11/09/22  0747 11/08/22  2040   POC GLUCOSE mg/dl 145* 145* 121 144* 151* 144* 122 167* 127 158* 117 158*               Lines/Drains:  Invasive Devices  Report    Peripheral Intravenous Line  Duration           Peripheral IV 11/11/22 Distal;Right;Ventral (anterior) Forearm <1 day          Drain  Duration           Cholecystostomy Tube 1 day                      Imaging: No pertinent imaging reviewed      Recent Cultures (last 7 days):         Last 24 Hours Medication List:   Current Facility-Administered Medications   Medication Dose Route Frequency Provider Last Rate   • acetaminophen  650 mg Oral Q6H [address] Sherice Vasquez DO     • bisacodyl  10 mg Rectal Daily PRN Sherice Vasquez DO     • colchicine  0 6 mg Oral Daily Aicha Stevenson Canas, DO     • Diclofenac Sodium  2 g Topical 4x Daily Michelle Savage, DO     • dicyclomine  10 mg Oral TID PRN Cookie Bosworth, MD     • gabapentin  100 mg Oral TID Cookie Bosworth, MD     • heparin (porcine)  3-20 Units/kg/hr (Order-Specific) Intravenous Titrated Cookie Bosworth, MD 13 1 Units/kg/hr (11/11/22 6283)   • heparin (porcine)  2,000 Units Intravenous Q1H PRN Cookie Bosworth, MD     • heparin (porcine)  4,000 Units Intravenous Q1H PRN Cookie Bosworth, MD     • HYDROmorphone  0 2 mg Intravenous Q4H PRN Michelle Savage DO     • insulin lispro  2-12 Units Subcutaneous 4x Daily (AC & HS) Catrachito Walker, DO     • oxyCODONE  2 5 mg Oral Q6H PRN Peosandy Purcell, DO     • oxyCODONE  5 mg Oral Q6H PRN Peola Jazzy, DO     • pantoprazole  40 mg Oral Early Morning Michelle Savage DO     • polyethylene glycol  17 g Oral Daily PRN Michelle Savage DO     • senna-docusate sodium  1 tablet Oral BID Venita Rubalcava MD     • sodium chloride (PF)  5 mL Intracatheter Daily Cookie Bosworth, MD     • sotalol  80 mg Oral Daily Michelle Savage DO     • warfarin  7 5 mg Oral Daily (warfarin) Michelle Savage DO          Today, Patient Was Seen By: Cookie Bosworth    **Please Note: This note may have been constructed using a voice recognition system  **

## 2022-11-11 NOTE — PLAN OF CARE
Problem: Potential for Falls  Goal: Patient will remain free of falls  Description: INTERVENTIONS:  - Educate patient/family on patient safety including physical limitations  - Instruct patient to call for assistance with activity   - Consult OT/PT to assist with strengthening/mobility   - Keep Call bell within reach  - Keep bed low and locked with side rails adjusted as appropriate  - Keep care items and personal belongings within reach  - Initiate and maintain comfort rounds  - Make Fall Risk Sign visible to staff  - Initiate/Maintain bed alarm  - Apply yellow socks and bracelet for high fall risk patients  - Consider moving patient to room near nurses station  Outcome: Progressing     Problem: Prexisting or High Potential for Compromised Skin Integrity  Goal: Skin integrity is maintained or improved  Description: INTERVENTIONS:  - Identify patients at risk for skin breakdown  - Assess and monitor skin integrity  - Assess and monitor nutrition and hydration status  - Monitor labs   - Assess for incontinence   - Turn and reposition patient  - Assist with mobility/ambulation  - Relieve pressure over bony prominences  - Avoid friction and shearing  - Provide appropriate hygiene as needed including keeping skin clean and dry  - Evaluate need for skin moisturizer/barrier cream  - Collaborate with interdisciplinary team   - Patient/family teaching  - Consider wound care consult   Outcome: Progressing     Problem: MOBILITY - ADULT  Goal: Maintain or return to baseline ADL function  Description: INTERVENTIONS:  -  Assess patient's ability to carry out ADLs; assess patient's baseline for ADL function and identify physical deficits which impact ability to perform ADLs (bathing, care of mouth/teeth, toileting, grooming, dressing, etc )  - Assess/evaluate cause of self-care deficits   - Assess range of motion  - Assess patient's mobility; develop plan if impaired  - Assess patient's need for assistive devices and provide as appropriate  - Encourage maximum independence but intervene and supervise when necessary  - Involve family in performance of ADLs  - Assess for home care needs following discharge   - Consider OT consult to assist with ADL evaluation and planning for discharge  - Provide patient education as appropriate  Outcome: Progressing  Goal: Maintains/Returns to pre admission functional level  Description: INTERVENTIONS:  - Perform BMAT or MOVE assessment daily    - Set and communicate daily mobility goal to care team and patient/family/caregiver  - Collaborate with rehabilitation services on mobility goals if consulted  - Reposition patient every 2 hours  - Record patient progress and toleration of activity level   Outcome: Progressing     Problem: HEMATOLOGIC - ADULT  Goal: Maintains hematologic stability  Description: INTERVENTIONS  - Assess for signs and symptoms of bleeding or hemorrhage  - Monitor labs  - Administer supportive blood products/factors as ordered and appropriate  Outcome: Progressing     Problem: Nutrition/Hydration-ADULT  Goal: Nutrient/Hydration intake appropriate for improving, restoring or maintaining nutritional needs  Description: Monitor and assess patient's nutrition/hydration status for malnutrition  Collaborate with interdisciplinary team and initiate plan and interventions as ordered  Monitor patient's weight and dietary intake as ordered or per policy  Utilize nutrition screening tool and intervene as necessary  Determine patient's food preferences and provide high-protein, high-caloric foods as appropriate       INTERVENTIONS:  - Monitor oral intake, urinary output, labs, and treatment plans  - Assess nutrition and hydration status and recommend course of action  - Evaluate amount of meals eaten  - Assist patient with eating if necessary   - Allow adequate time for meals  - Recommend/ encourage appropriate diets, oral nutritional supplements, and vitamin/mineral supplements  - Order, calculate, and assess calorie counts as needed  - Recommend, monitor, and adjust tube feedings and TPN/PPN based on assessed needs  - Assess need for intravenous fluids  - Provide specific nutrition/hydration education as appropriate  - Include patient/family/caregiver in decisions related to nutrition  Outcome: Progressing

## 2022-11-11 NOTE — ASSESSMENT & PLAN NOTE
· Patient has a history of acute cholecystitis s/p percutaneous cholecystostomy tube  · Now presenting with LFT elevation and right upper quadrant pain  · CT abdomen/pelvis: No acute findings in the chest abdomen or pelvis Transhepatic percutaneous cholecystostomy tube remains in place  Persistent gallbladder inflammatory changes  · INR 1 50 this a m  -->  Awaiting INR to be between 2 and 3 for therapeutic effect  Plan:  · Surgery consulted, suggested IR evaluation  · Cholecystostomy tube has been replaced and upsized by IR 11/10/22  ·   Small amounts of bloody discharge present in the   Cholecystostomy bag  ·  stopped IV antibiotics  Patient had had symptomatic improvement    · Continue to monitor CMP

## 2022-11-11 NOTE — CASE MANAGEMENT
Case Management Discharge Planning Note    Patient name Mona Duggan  [facility] /S -66 MRN [id number]  : 1935 Date 2022       Current Admission Date: 2022  Current Admission Diagnosis:Acute on chronic cholecystitis   Patient Active Problem List    Diagnosis Date Noted   • Leg pain 2022   • Thoracic back pain 2022   • Acute on chronic cholecystitis 2022   • Bacteremia 10/23/2022   • Acute cholecystitis 2022   • Benign prostatic hyperplasia with lower urinary tract symptoms 2022   • OBINNA (obstructive sleep apnea)    • Stage 3a chronic kidney disease with elevated creatinine 2022   • Acute kidney injury ([address] ) 2021   • Osteoarthritis, knee 11/15/2021   • Status post right knee replacement 11/15/2021   • Mixed hyperlipidemia 2021   • Swelling of limb 2021   • Chronic deep vein thrombosis (DVT) ([facility] ) 09/10/2020   • Secondary lymphedema 2018   • Venous ulcer of ankle, right ([facility] ) 2018   • S/P AVR 2018   • Thrombophlebitis 2018   • Presence of implantable cardioverter-defibrillator (ICD) 2018   • Hematuria, gross 2017   • Chronic bilateral low back pain without sciatica 2017   • Compression fracture of L4 lumbar vertebra 2017   • BPH (benign prostatic hyperplasia) 2017   • Obesity, unspecified obesity severity, unspecified obesity type 2017   • Hydrocele 2017   • Chronic anticoagulation 2017   • Anemia 2017   • Renal cyst 2017   • Urinary tract infection, acute 2017   • Epididymitis 2017   • Cellulitis of scrotum 2017   • Facial cellulitis 2016   • Dental abscess 2016   • 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 2015   • Ventricular tachycardia 04/15/2014   • Paroxysmal atrial fibrillation ([facility] ) 2014   • Edema    • Diastolic congestive heart failure ([address] ) 2014   • Endocarditis 03/19/2014   • Left knee pain 03/14/2014   • Other disorder of calcium metabolism 03/14/2014   • Type 2 diabetes mellitus with diabetic neuropathy, without long-term current use of insulin ([facility] ) 08/19/2013   • Peripheral venous insufficiency 08/19/2013   • Cardiomegaly 08/19/2013   • PVD (peripheral vascular disease) ([facility] ) 04/19/2013   • Dyslipidemia 04/19/2013      LOS (days): 7  Geometric Mean LOS (GMLOS) (days): 7 90  Days to GMLOS:1 1     OBJECTIVE:  Risk of Unplanned Readmission Score: 25 99         Current admission status: Inpatient   Preferred Pharmacy:   [address] [address]  Phone: [phone] Fax: aPtricia (OS) [facility], [address] [facility] [address] [facility]  [address] [place] [address]  Phone: [phone] Fax: [phone]    Primary Care Provider: Kelvin Marquez DO    Primary Insurance: MEDICARE  Secondary Insurance: 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

## 2022-11-12 ENCOUNTER — APPOINTMENT (INPATIENT)
Dept: RADIOLOGY | Facility: HOSPITAL | Age: 87
DRG: 444 | End: 2022-11-12
Payer: MEDICARE

## 2022-11-12 PROBLEM — Z79.01 SUBTHERAPEUTIC ANTICOAGULATION: Status: ACTIVE | Noted: 2022-11-12

## 2022-11-12 PROBLEM — Z51.81 SUBTHERAPEUTIC ANTICOAGULATION: Status: ACTIVE | Noted: 2022-11-12

## 2022-11-12 PROBLEM — R10.9 ABDOMINAL CRAMPING: Status: ACTIVE | Noted: 2022-11-12

## 2022-11-12 PROBLEM — M54.6 THORACIC BACK PAIN: Status: RESOLVED | Noted: 2022-11-04 | Resolved: 2022-11-12

## 2022-11-12 LAB
ALBUMIN SERPL BCP-MCNC: 3.1 G/DL (ref 3.5–5)
ALP SERPL-CCNC: 88 U/L (ref 34–104)
ALT SERPL W P-5'-P-CCNC: 11 U/L (ref 7–52)
ANION GAP SERPL CALCULATED.3IONS-SCNC: 10 MMOL/L (ref 4–13)
APTT PPP: 144 SECONDS (ref 23–37)
APTT PPP: 58 SECONDS (ref 23–37)
APTT PPP: 85 SECONDS (ref 23–37)
AST SERPL W P-5'-P-CCNC: 22 U/L (ref 13–39)
BILIRUB DIRECT SERPL-MCNC: 0.13 MG/DL (ref 0–0.2)
BILIRUB SERPL-MCNC: 0.62 MG/DL (ref 0.2–1)
BUN SERPL-MCNC: 23 MG/DL (ref 5–25)
CALCIUM SERPL-MCNC: 9.3 MG/DL (ref 8.4–10.2)
CHLORIDE SERPL-SCNC: 106 MMOL/L (ref 96–108)
CO2 SERPL-SCNC: 23 MMOL/L (ref 21–32)
CREAT SERPL-MCNC: 0.96 MG/DL (ref 0.6–1.3)
ERYTHROCYTE [DISTWIDTH] IN BLOOD BY AUTOMATED COUNT: 19.3 % (ref 11.6–15.1)
GFR SERPL CREATININE-BSD FRML MDRD: 71 ML/MIN/1.73SQ M
GLUCOSE SERPL-MCNC: 124 MG/DL (ref 65–140)
GLUCOSE SERPL-MCNC: 132 MG/DL (ref 65–140)
GLUCOSE SERPL-MCNC: 132 MG/DL (ref 65–140)
GLUCOSE SERPL-MCNC: 159 MG/DL (ref 65–140)
GLUCOSE SERPL-MCNC: 163 MG/DL (ref 65–140)
HCT VFR BLD AUTO: 30.3 % (ref 36.5–49.3)
HGB BLD-MCNC: 9.1 G/DL (ref 12–17)
INR PPP: 1.56 (ref 0.84–1.19)
MCH RBC QN AUTO: 27.2 PG (ref 26.8–34.3)
MCHC RBC AUTO-ENTMCNC: 30 G/DL (ref 31.4–37.4)
MCV RBC AUTO: 91 FL (ref 82–98)
PLATELET # BLD AUTO: 358 THOUSANDS/UL (ref 149–390)
PMV BLD AUTO: 9.4 FL (ref 8.9–12.7)
POTASSIUM SERPL-SCNC: 3.7 MMOL/L (ref 3.5–5.3)
PROT SERPL-MCNC: 6.8 G/DL (ref 6.4–8.4)
PROTHROMBIN TIME: 18.9 SECONDS (ref 11.6–14.5)
RBC # BLD AUTO: 3.34 MILLION/UL (ref 3.88–5.62)
SODIUM SERPL-SCNC: 139 MMOL/L (ref 135–147)
WBC # BLD AUTO: 7.86 THOUSAND/UL (ref 4.31–10.16)

## 2022-11-12 PROCEDURE — 97116 GAIT TRAINING THERAPY: CPT

## 2022-11-12 PROCEDURE — 99232 SBSQ HOSP IP/OBS MODERATE 35: CPT | Performed by: INTERNAL MEDICINE

## 2022-11-12 PROCEDURE — 85730 THROMBOPLASTIN TIME PARTIAL: CPT | Performed by: INTERNAL MEDICINE

## 2022-11-12 PROCEDURE — 85610 PROTHROMBIN TIME: CPT

## 2022-11-12 PROCEDURE — 74018 RADEX ABDOMEN 1 VIEW: CPT

## 2022-11-12 PROCEDURE — 97110 THERAPEUTIC EXERCISES: CPT

## 2022-11-12 PROCEDURE — 80076 HEPATIC FUNCTION PANEL: CPT

## 2022-11-12 PROCEDURE — 85027 COMPLETE CBC AUTOMATED: CPT

## 2022-11-12 PROCEDURE — 82948 REAGENT STRIP/BLOOD GLUCOSE: CPT

## 2022-11-12 PROCEDURE — 80048 BASIC METABOLIC PNL TOTAL CA: CPT

## 2022-11-12 PROCEDURE — 97530 THERAPEUTIC ACTIVITIES: CPT

## 2022-11-12 RX ORDER — POTASSIUM CHLORIDE 20 MEQ/1
40 TABLET, EXTENDED RELEASE ORAL ONCE
Status: COMPLETED | OUTPATIENT
Start: 2022-11-12 | End: 2022-11-12

## 2022-11-12 RX ADMIN — TRIMETHOBENZAMIDE HYDROCHLORIDE 200 MG: 100 INJECTION INTRAMUSCULAR at 08:29

## 2022-11-12 RX ADMIN — SOTALOL HYDROCHLORIDE 80 MG: 80 TABLET ORAL at 08:13

## 2022-11-12 RX ADMIN — HEPARIN SODIUM 2000 UNITS: 1000 INJECTION INTRAVENOUS; SUBCUTANEOUS at 07:16

## 2022-11-12 RX ADMIN — ACETAMINOPHEN 650 MG: 325 TABLET, FILM COATED ORAL at 05:44

## 2022-11-12 RX ADMIN — POTASSIUM CHLORIDE 40 MEQ: 1500 TABLET, EXTENDED RELEASE ORAL at 10:37

## 2022-11-12 RX ADMIN — SENNOSIDES AND DOCUSATE SODIUM 1 TABLET: 8.6; 5 TABLET ORAL at 08:13

## 2022-11-12 RX ADMIN — HEPARIN SODIUM 13.1 UNITS/KG/HR: 10000 INJECTION, SOLUTION INTRAVENOUS at 05:45

## 2022-11-12 RX ADMIN — DICLOFENAC SODIUM TOPICAL GEL, 1%, 2 G: 10 GEL TOPICAL at 08:18

## 2022-11-12 RX ADMIN — PANTOPRAZOLE SODIUM 40 MG: 40 TABLET, DELAYED RELEASE ORAL at 05:44

## 2022-11-12 RX ADMIN — WARFARIN SODIUM 7.5 MG: 7.5 TABLET ORAL at 17:28

## 2022-11-12 RX ADMIN — COLCHICINE 0.6 MG: 0.6 TABLET ORAL at 08:13

## 2022-11-12 RX ADMIN — ACETAMINOPHEN 650 MG: 325 TABLET, FILM COATED ORAL at 23:00

## 2022-11-12 RX ADMIN — HEPARIN SODIUM 15.1 UNITS/KG/HR: 10000 INJECTION, SOLUTION INTRAVENOUS at 21:59

## 2022-11-12 RX ADMIN — ACETAMINOPHEN 650 MG: 325 TABLET, FILM COATED ORAL at 17:28

## 2022-11-12 RX ADMIN — SODIUM CHLORIDE 5 ML: 9 INJECTION INTRAMUSCULAR; INTRAVENOUS; SUBCUTANEOUS at 08:18

## 2022-11-12 RX ADMIN — DICLOFENAC SODIUM TOPICAL GEL, 1%, 2 G: 10 GEL TOPICAL at 21:27

## 2022-11-12 RX ADMIN — GABAPENTIN 100 MG: 100 CAPSULE ORAL at 17:28

## 2022-11-12 RX ADMIN — INSULIN LISPRO 2 UNITS: 100 INJECTION, SOLUTION INTRAVENOUS; SUBCUTANEOUS at 17:30

## 2022-11-12 RX ADMIN — ACETAMINOPHEN 650 MG: 325 TABLET, FILM COATED ORAL at 12:59

## 2022-11-12 RX ADMIN — DICYCLOMINE HYDROCHLORIDE 10 MG: 10 CAPSULE ORAL at 02:38

## 2022-11-12 RX ADMIN — GABAPENTIN 100 MG: 100 CAPSULE ORAL at 08:13

## 2022-11-12 RX ADMIN — GABAPENTIN 100 MG: 100 CAPSULE ORAL at 21:27

## 2022-11-12 NOTE — ASSESSMENT & PLAN NOTE
Lab Results   Component Value Date    EGFR 71 11/12/2022    EGFR 60 11/11/2022    EGFR 51 11/10/2022    CREATININE 0 96 11/12/2022    CREATININE 1 10 11/11/2022    CREATININE 1 26 11/10/2022   · Following BMP

## 2022-11-12 NOTE — ASSESSMENT & PLAN NOTE
· Patient has a history of acute cholecystitis s/p percutaneous cholecystostomy tube  · Now presenting with LFT elevation and right upper quadrant pain  · CT abdomen/pelvis: No acute findings in the chest abdomen or pelvis Transhepatic percutaneous cholecystostomy tube remains in place  Persistent gallbladder inflammatory changes  · Small amounts of bloody discharge present in the Cholecystostomy bag  Plan:  · Cholecystostomy tube has been replaced and upsized by IR 11/10/22  · Stopped IV antibiotics  Completed 7 days of cefepime and flagyl     · Continue to monitor CMP

## 2022-11-12 NOTE — ASSESSMENT & PLAN NOTE
Pt complaining of abdominal cramping, with vomiting  Denies nausea  He vomited multiple times between 11/11/22 and 11/12/22  He felt mild relief with dicyclomine, and some relief with tigan  Plan: dicyclomine and tigan PRN for cramping and vomiting

## 2022-11-12 NOTE — ASSESSMENT & PLAN NOTE
· Patient has a history of acute cholecystitis s/p percutaneous cholecystostomy tube  · Now presenting with LFT elevation and right upper quadrant pain  · CT abdomen/pelvis: No acute findings in the chest abdomen or pelvis Transhepatic percutaneous cholecystostomy tube remains in place  Persistent gallbladder inflammatory changes  · Small amounts of bloody discharge present in the Cholecystostomy bag  Plan:  · Surgery consulted, suggested IR evaluation  · Cholecystostomy tube has been replaced and upsized by IR 11/10/22  · stopped IV antibiotics  Patient had had symptomatic improvement    · Continue to monitor CMP

## 2022-11-12 NOTE — PROGRESS NOTES
[facility]  Progress Note - Yovana Fair 1935, 81 y o  male MRN: [id number]  Unit/Bed#: S -01 Encounter: [id number]  Primary Care Provider: Braxton Campos DO   Date and time admitted to hospital: 11/4/2022  2:38 PM    Subtherapeutic anticoagulation  Assessment & Plan  The patient required reduction of anticoagulation to facilitate his cholecystostomy tube resizing  He will need a theraputic INR of 2-3 before discharge, with heparin bridge  -INR still subtherapeutic  * Acute on chronic cholecystitis  Assessment & Plan  · Patient has a history of acute cholecystitis s/p percutaneous cholecystostomy tube  · Now presenting with LFT elevation and right upper quadrant pain  · CT abdomen/pelvis: No acute findings in the chest abdomen or pelvis Transhepatic percutaneous cholecystostomy tube remains in place  Persistent gallbladder inflammatory changes  · Small amounts of bloody discharge present in the Cholecystostomy bag  Plan:  · Surgery consulted, suggested IR evaluation  · Cholecystostomy tube has been replaced and upsized by IR 11/10/22  · stopped IV antibiotics  Patient had had symptomatic improvement  · Continue to monitor CMP    Abdominal cramping  Assessment & Plan  Pt complaining of abdominal cramping, with vomiting  Denies nausea  He vomited multiple times between 11/11/22 and 11/12/22  He felt mild relief with dicyclomine, and some relief with tigan  Plan:   -dicyclomine and tigan PRN for cramping and vomiting   -KUB - pending   -Hepatic function panel  -speech eval    Leg pain  Assessment & Plan   The patient has been complaining of bilateral leg pain, likely secondary to his edema   Versus neuropathic pain  He describes it as a shooting pain, and that is exacerbated when he moves legs  Leg pain is improving with colchicine     -  Increase gabapentin to t i d  100 mg   - Continuing colchicine for suspicion gout affecting bilateral ankles    - initiated compression stockings  Diastolic congestive heart failure [facility])  Assessment & Plan  Wt Readings from Last 3 Encounters:   10/25/22 (!) 137 kg (302 lb)   10/24/22 (!) 138 kg (305 lb 3 2 oz)   09/26/22 (!) 139 kg (307 lb)     ·  not currently diuresing  · Reassess volume status daily with daily weight and I/O         Stage 3a chronic kidney disease with elevated creatinine  Assessment & Plan  Lab Results   Component Value Date    EGFR 71 11/12/2022    EGFR 60 11/11/2022    EGFR 51 11/10/2022    CREATININE 0 96 11/12/2022    CREATININE 1 10 11/11/2022    CREATININE 1 26 11/10/2022   · Following BMP    Paroxysmal atrial fibrillation (HCC)  Assessment & Plan  · Restarting warfarin at 7 5 mg on 11/10/22  · On heparin bridge  · Continue sotalol    OBINNA (obstructive sleep apnea)  Assessment & Plan  · Continue cpap    Thrombophlebitis  Assessment & Plan  · S/p IVC filter   · Resuming warfarin as above, with heparin bridge  · Patient ordered for Doppler of lower extremities for lower extremity pain  ·   Unrevealing for lower extremities and right  Upper extremity, demonstrated left upper extremity thrombophlebitis  ·   Ordered warm compresses for thrombophlebitis  Thoracic back pain-resolved as of 11/12/2022  Assessment & Plan  · Consider MRI if patient's pain better controlled or increased concern for bone infection  · He did not complain of back pain on 11/05/2022 - 11/10/2022      VTE Pharmacologic Prophylaxis: VTE Score: 3 Moderate Risk (Score 3-4) - Pharmacological DVT Prophylaxis Ordered: heparin  Patient Centered Rounds: I performed bedside rounds with nursing staff today  Discussions with Specialists or Other Care Team Provider: None    Education and Discussions with Family / Patient: Updated  (wife) at bedside  Current Length of Stay: 8 day(s)  Current Patient Status: Inpatient   Discharge Plan: Anticipate discharge in 48-72 hrs to rehab facility      Code Status: Level 1 - Full Code    Subjective:   Patient had events of vomiting last night, though he denies any current nausea  He did not vomit after being given tigan  He also is still having abdominal cramping, with some relief with using dicyclomine  Otherwise, he denies shortness of breath, diarrhea, constipation, or abdominal pains  He says that he felt air come out of around the cholecystostomy tube, not into the bag  He feels that his foot pain is improving  Objective:     Vitals:   Temp (24hrs), Av 8 °F (36 6 °C), Min:97 8 °F (36 6 °C), Max:97 8 °F (36 6 °C)    Temp:  [97 8 °F (36 6 °C)] 97 8 °F (36 6 °C)  HR:  [79-85] 79  Resp:  [18-20] 18  BP: (132-141)/(70-81) 132/81  SpO2:  [93 %-98 %] 98 %  Body mass index is 38 8 kg/m²  Input and Output Summary (last 24 hours): Intake/Output Summary (Last 24 hours) at 2022 1331  Last data filed at 2022 1030  Gross per 24 hour   Intake 245 ml   Output 425 ml   Net -180 ml       Physical Exam:   Physical Exam  Constitutional:       General: He is not in acute distress  Appearance: He is not toxic-appearing  HENT:      Head: Normocephalic and atraumatic  Nose: Nose normal       Mouth/Throat:      Mouth: Mucous membranes are moist       Pharynx: No oropharyngeal exudate or posterior oropharyngeal erythema  Eyes:      Conjunctiva/sclera: Conjunctivae normal       Pupils: Pupils are equal, round, and reactive to light  Cardiovascular:      Rate and Rhythm: Normal rate and regular rhythm  Pulses: Normal pulses  Heart sounds: Normal heart sounds  Pulmonary:      Breath sounds: Normal breath sounds  No wheezing, rhonchi or rales  Abdominal:      General: Bowel sounds are normal  There is no distension  Palpations: Abdomen is soft  Tenderness: There is abdominal tenderness  Comments: Mild tenderness across the lower abdomen  Musculoskeletal:         General: No deformity  Cervical back: Neck supple        Right lower leg: Edema present  Left lower leg: Edema present  Comments: Almost completely resolved tenderness on dorsiflexion of the bilateral feet, no pain with plantar flexion  Some pain with pressing on bilateral calves  Skin:     General: Skin is warm and dry  Comments: Evidence of chronic venous stasis in the lower leg  Neurological:      General: No focal deficit present  Mental Status: He is alert and oriented to person, place, and time  Psychiatric:         Mood and Affect: Mood normal          Behavior: Behavior normal       Comments: Alert and oriented x3  Additional Data:     Labs:  Results from last 7 days   Lab Units 11/12/22  0543   WBC Thousand/uL 7 86   HEMOGLOBIN g/dL 9 1*   HEMATOCRIT % 30 3*   PLATELETS Thousands/uL 358     Results from last 7 days   Lab Units 11/12/22  0543 11/11/22  0459   SODIUM mmol/L 139 140   POTASSIUM mmol/L 3 7 3 8   CHLORIDE mmol/L 106 106   CO2 mmol/L 23 24   BUN mg/dL 23 28*   CREATININE mg/dL 0 96 1 10   ANION GAP mmol/L 10 10   CALCIUM mg/dL 9 3 9 2   ALBUMIN g/dL  --  3 0*   TOTAL BILIRUBIN mg/dL  --  0 62   ALK PHOS U/L  --  91   ALT U/L  --  12   AST U/L  --  21   GLUCOSE RANDOM mg/dL 132 112     Results from last 7 days   Lab Units 11/12/22  0543   INR  1 56*     Results from last 7 days   Lab Units 11/12/22  1054 11/12/22  0733 11/11/22  2108 11/11/22  1558 11/11/22  1141 11/11/22  0736 11/10/22  2149 11/10/22  1603 11/10/22  1110 11/10/22  0746 11/09/22  2054 11/09/22  1611   POC GLUCOSE mg/dl 159* 132 137 145* 145* 121 144* 151* 144* 122 167* 127               Lines/Drains:  Invasive Devices  Report    Peripheral Intravenous Line  Duration           Peripheral IV 11/11/22 Distal;Right;Ventral (anterior) Forearm 1 day          Drain  Duration           Cholecystostomy Tube 2 days                      Imaging: No pertinent imaging reviewed      Recent Cultures (last 7 days):         Last 24 Hours Medication List:   Current Facility-Administered Medications   Medication Dose Route Frequency Provider Last Rate   • acetaminophen  650 mg Oral Q6H [facility] Les Sick, DO     • bisacodyl  10 mg Rectal Daily PRN Les Sick, DO     • colchicine  0 6 mg Oral Daily Les Sick, DO     • Diclofenac Sodium  2 g Topical 4x Daily Les Sick, DO     • dicyclomine  10 mg Oral TID PRN Simeon Osborn MD     • gabapentin  100 mg Oral TID Simeon Osborn MD     • heparin (porcine)  3-20 Units/kg/hr (Order-Specific) Intravenous Titrated Simeon Osborn MD 15 1 Units/kg/hr (11/12/22 4854)   • heparin (porcine)  2,000 Units Intravenous Q1H PRN Simeon Osborn MD     • heparin (porcine)  4,000 Units Intravenous Q1H PRN Simeon Osborn MD     • HYDROmorphone  0 2 mg Intravenous Q4H PRN Les Sick, DO     • insulin lispro  2-12 Units Subcutaneous 4x Daily (AC & HS) Akbar Brennan DO     • oxyCODONE  2 5 mg Oral Q6H PRN Colt Dorr, DO     • oxyCODONE  5 mg Oral Q6H PRN Colt Dorr, DO     • pantoprazole  40 mg Oral Early Morning Les Sick, DO     • polyethylene glycol  17 g Oral Daily PRN Les Sick, DO     • sodium chloride (PF)  5 mL Intracatheter Daily Simeon Osborn MD     • sotalol  80 mg Oral Daily Les Sick, DO     • trimethobenzamide  200 mg Intramuscular Q6H PRN Billie Toribio DO     • warfarin  7 5 mg Oral Daily (warfarin) Les Sick, DO          Today, Patient Was Seen By: Simeon Osborn    **Please Note: This note may have been constructed using a voice recognition system  **

## 2022-11-12 NOTE — PLAN OF CARE
Problem: PHYSICAL THERAPY ADULT  Goal: Performs mobility at highest level of function for planned discharge setting  See evaluation for individualized goals  Description: Treatment/Interventions: Functional transfer training, LE strengthening/ROM, Therapeutic exercise, Endurance training, Patient/family training, Equipment eval/education, Bed mobility, Gait training  Equipment Recommended: Deborah Somers       See flowsheet documentation for full assessment, interventions and recommendations  Outcome: Progressing  Note:    Problem List: Decreased strength, Decreased endurance, Decreased mobility, Impaired balance, Pain, Impaired sensation  Assessment: pt began tx session lying supine in the bed and was agreeable to participate in PT intervention  pt continues to remain consistnat with min Ax1 for all bed mobility such as completing a supine<>sit EOB transfer  pt was able to sit EOB and perform TE activities in order to increase static/dynamic sitting balance for 8 minutes  progess was noted this tx session as pt was able to perform multiple STS and SPT in todays tx session with BRW w/ a decrease in level of assistance required min Ax1  pt was also able to increase static standing tolerance and balance as pt stood 3 minutes w/o UE support and min Ax1 while PCA was cleaning pt buttocks after bowel movement  pt continues to be limited with functional mobility and ambulation distance  pt would benefit from continued skilled PT intervention in order to maximize functional independence and safety with all OOB mobility  post tx pt in recliner with call bell and all pt needs met        PT Discharge Recommendation: Post acute rehabilitation services    See flowsheet documentation for full assessment

## 2022-11-12 NOTE — PHYSICAL THERAPY NOTE
PHYSICAL THERAPY NOTE          Patient Name: Liliana Houser  KMHTX'Y Date: 11/12/2022 11/12/22 0935   PT Last Visit   PT Visit Date 11/12/22   Note Type   Note Type Treatment   Pain Assessment   Pain Assessment Tool 0-10   Pain Score No Pain   Pain Location/Orientation Orientation: Left; Location: Leg   Pain Onset/Description Descriptor: Sharp   Effect of Pain on Daily Activities limited functional mobility and ambulation distance   Patient's Stated Pain Goal No pain   Hospital Pain Intervention(s) Repositioned; Ambulation/increased activity; Emotional support; Rest   Multiple Pain Sites No   Restrictions/Precautions   Weight Bearing Precautions Per Order No   Other Precautions Chair Alarm; Bed Alarm; Fall Risk;Pain   General   Chart Reviewed Yes   Additional Pertinent History pt compression stocking put on prior to PT intervention   Response to Previous Treatment Patient with no complaints from previous session  Family/Caregiver Present Yes  (wife)   Cognition   Overall Cognitive Status WFL   Arousal/Participation Alert; Responsive; Cooperative   Attention Within functional limits   Orientation Level Oriented X4   Memory Within functional limits   Following Commands Follows all commands and directions without difficulty   Comments pt was pleasant and cooperative throughout tx session   Subjective   Subjective pt was agreeable to participate in PT intervention   Bed Mobility   Supine to Sit 4  Minimal assistance   Additional items Assist x 1; Increased time required;HOB elevated;Verbal cues   Sit to Supine Unable to assess   Additional Comments pt seated OOB in the recliner post tx session with call bell and all  pt needs met   Transfers   Sit to Stand 4  Minimal assistance   Additional items Assist x 1; Increased time required;Verbal cues   Stand to Sit 4  Minimal assistance   Additional items Assist x 1; Increased time required;Verbal cues   Stand pivot 4  Minimal assistance   Additional items Assist x 1; Armrests; Increased time required;Verbal cues   Toilet transfer 4  Minimal assistance   Additional items Assist x 1; Armrests; Increased time required;Verbal cues; Commode   Additional Comments pt was able to perform multiple STS and SPT in todays tx session with RW and a decrease in level of assistance required compared to previous tx sessions min Ax1   Ambulation/Elevation   Gait pattern Decreased foot clearance; Short stride; Foward flexed; Excessively slow; Shuffling   Gait Assistance 3  Moderate assist   Additional items Assist x 1;Verbal cues   Assistive Device Bariatric Rolling walker   Distance 3'x2 RW   Balance   Static Sitting Fair +   Dynamic Sitting Fair   Static Standing Poor +   Ambulatory Poor +   Endurance Deficit   Endurance Deficit Yes   Endurance Deficit Description limited ambulation distance   Activity Tolerance   Activity Tolerance Patient limited by fatigue   Nurse Made Aware Spoke to RN iW[employer]   Exercises   Knee AROM Long Arc Quad Sitting;15 reps;AROM; Bilateral   Ankle Pumps Sitting;20 reps;AROM; Bilateral   Marching Sitting;10 reps;AROM; Bilateral   Assessment   Problem List Decreased strength;Decreased endurance;Decreased mobility; Impaired balance;Pain; Impaired sensation   Assessment pt began tx session lying supine in the bed and was agreeable to participate in PT intervention  pt continues to remain consistnat with min Ax1 for all bed mobility such as completing a supine<>sit EOB transfer  pt was able to sit EOB and perform TE activities in order to increase static/dynamic sitting balance for 8 minutes  progess was noted this tx session as pt was able to perform multiple STS and SPT in todays tx session with BRW w/ a decrease in level of assistance required min Ax1   pt was also able to increase static standing tolerance and balance as pt stood 3 minutes w/o UE support and min Ax1 while PCA was cleaning pt buttocks after bowel movement  pt continues to be limited with functional mobility and ambulation distance  pt would benefit from continued skilled PT intervention in order to maximize functional independence and safety with all OOB mobility  post tx pt in recliner with call bell and all pt needs met   Goals   Patient Goals to be more active   STG Expiration Date 11/17/22   PT Treatment Day 3   Plan   Treatment/Interventions Functional transfer training;LE strengthening/ROM; Therapeutic exercise; Endurance training;Patient/family training;Equipment eval/education; Bed mobility;Gait training;Spoke to nursing   Progress Slow progress, decreased activity tolerance   PT Frequency 3-5x/wk   Recommendation   PT Discharge Recommendation Post acute rehabilitation services   Equipment Recommended [address] Mobility Inpatient   Turning in Bed Without Bedrails 3   Lying on Back to Sitting on Edge of Flat Bed 3   Moving Bed to Chair 3   Standing Up From Chair 3   Walk in Room 2   Climb 3-5 Stairs 1   Basic Mobility Inpatient Raw Score 15   Basic Mobility Standardized Score 36 97   Turning Head Towards Sound 4   Follow Simple Instructions 4   Low Function Basic Mobility Raw Score 23   Low Function Basic Mobility Standardized Score 37 22   Highest Level Of Mobility   -[facility] Goal 4: Move to chair/commode   -[facility] Achieved 5: Stand (1 or more minutes)   Education   Education Provided Mobility training;Assistive device; Other  (functional transfers)   Patient Demonstrates acceptance/verbal understanding   End of Consult   Patient Position at End of Consult Bedside chair;Bed/Chair alarm activated; All needs within reach      11/12/22 0935   PT Last Visit   PT Visit Date 11/12/22   Note Type   Note Type Treatment   Pain Assessment   Pain Assessment Tool 0-10   Pain Score No Pain   Pain Location/Orientation Orientation: Left; Location: Leg   Pain Onset/Description Descriptor: Sharp   Effect of Pain on Daily Activities limited functional mobility and ambulation distance   Patient's Stated Pain Goal No pain   Hospital Pain Intervention(s) Repositioned; Ambulation/increased activity; Emotional support; Rest   Multiple Pain Sites No   Restrictions/Precautions   Weight Bearing Precautions Per Order No   Other Precautions Chair Alarm; Bed Alarm; Fall Risk;Pain   General   Chart Reviewed Yes   Additional Pertinent History pt compression stocking put on prior to PT intervention   Response to Previous Treatment Patient with no complaints from previous session  Family/Caregiver Present Yes  (wife)   Cognition   Overall Cognitive Status WFL   Arousal/Participation Alert; Responsive; Cooperative   Attention Within functional limits   Orientation Level Oriented X4   Memory Within functional limits   Following Commands Follows all commands and directions without difficulty   Comments pt was pleasant and cooperative throughout tx session   Subjective   Subjective pt was agreeable to participate in PT intervention   Bed Mobility   Supine to Sit 4  Minimal assistance   Additional items Assist x 1; Increased time required;HOB elevated;Verbal cues   Sit to Supine Unable to assess   Additional Comments pt seated OOB in the recliner post tx session with call bell and all  pt needs met   Transfers   Sit to Stand 4  Minimal assistance   Additional items Assist x 1; Increased time required;Verbal cues   Stand to Sit 4  Minimal assistance   Additional items Assist x 1; Increased time required;Verbal cues   Stand pivot 4  Minimal assistance   Additional items Assist x 1; Armrests; Increased time required;Verbal cues   Toilet transfer 4  Minimal assistance   Additional items Assist x 1; Armrests; Increased time required;Verbal cues; Commode   Additional Comments pt was able to perform multiple STS and SPT in todays tx session with RW and a decrease in level of assistance required compared to previous tx sessions min Ax1   Ambulation/Elevation   Gait pattern Decreased foot clearance; Short stride; Foward flexed; Excessively slow; Shuffling   Gait Assistance 3  Moderate assist   Additional items Assist x 1;Verbal cues   Assistive Device Bariatric Rolling walker   Distance 3'x2 RW   Balance   Static Sitting Fair +   Dynamic Sitting Fair   Static Standing Poor +   Ambulatory Poor +   Endurance Deficit   Endurance Deficit Yes   Endurance Deficit Description limited ambulation distance   Activity Tolerance   Activity Tolerance Patient limited by fatigue   Nurse Made Aware Spoke to RN Wily   Exercises   Knee AROM Long Arc Quad Sitting;15 reps;AROM; Bilateral   Ankle Pumps Sitting;20 reps;AROM; Bilateral   Marching Sitting;10 reps;AROM; Bilateral   Assessment   Problem List Decreased strength;Decreased endurance;Decreased mobility; Impaired balance;Pain; Impaired sensation   Assessment pt began tx session lying supine in the bed and was agreeable to participate in PT intervention  pt continues to remain consistnat with min Ax1 for all bed mobility such as completing a supine<>sit EOB transfer  pt was able to sit EOB and perform TE activities in order to increase static/dynamic sitting balance for 8 minutes  progess was noted this tx session as pt was able to perform multiple STS and SPT in todays tx session with BRW w/ a decrease in level of assistance required min Ax1  pt was also able to increase static standing tolerance and balance as pt stood 3 minutes w/o UE support and min Ax1 while PCA was cleaning pt buttocks after bowel movement  pt continues to be limited with functional mobility and ambulation distance  pt would benefit from continued skilled PT intervention in order to maximize functional independence and safety with all OOB mobility  post tx pt in recliner with call bell and all pt needs met   Goals   Patient Goals to be more active   STG Expiration Date 11/17/22   PT Treatment Day 3   Plan   Treatment/Interventions Functional transfer training;LE strengthening/ROM; Therapeutic exercise; Endurance training;Patient/family training;Equipment eval/education; Bed mobility;Gait training;Spoke to nursing   Progress Slow progress, decreased activity tolerance   PT Frequency 3-5x/wk   Recommendation   PT Discharge Recommendation Post acute rehabilitation services   Equipment Recommended [address] Mobility Inpatient   Turning in Bed Without Bedrails 3   Lying on Back to Sitting on Edge of Flat Bed 3   Moving Bed to Chair 3   Standing Up From Chair 3   Walk in Room 2   Climb 3-5 Stairs 1   Basic Mobility Inpatient Raw Score 15   Basic Mobility Standardized Score 36 97   Turning Head Towards Sound 4   Follow Simple Instructions 4   Low Function Basic Mobility Raw Score 23   Low Function Basic Mobility Standardized Score 37 22   Highest Level Of Mobility   -[facility] Goal 4: Move to chair/commode   -[facility] Achieved 5: Stand (1 or more minutes)   Education   Education Provided Mobility training;Assistive device; Other  (functional transfers)   Patient Demonstrates acceptance/verbal understanding   End of Consult   Patient Position at End of Consult Bedside chair;Bed/Chair alarm activated; All needs within reach   The patient's AM-PAC Basic Mobility Inpatient Short Form Raw Score is 15  A Raw score of less than or equal to 16 suggests the patient may benefit from discharge to post-acute rehabilitation services  Please also refer to the recommendation of the Physical Therapist for safe discharge planning        Jg Mendoza

## 2022-11-12 NOTE — ASSESSMENT & PLAN NOTE
The patient has been complaining of bilateral leg pain, likely secondary to his edema   Versus neuropathic pain  He describes it as a shooting pain, and that is exacerbated when he moves legs  Leg pain is improving with colchicine     -  Gabapentin to t i d  100 mg   - Continuing colchicine for suspicion gout affecting bilateral ankles until tomorrow  Transition to allopurinol  - initiated compression stockings

## 2022-11-12 NOTE — CONSULTS
Consult Note - Wound   Hershall Loulou 81 y o  male MRN: [id number]  Unit/Bed#: S -01 Encounter: [id number]      Assessment:   Patient has a wound on right medial ankle  According to patient, dermatology is managing this wound and gave patient a cream to put on it  Patient says it has improved  Wound is superficial  Measures approximately 1x1x0 1 cm  Irregular wound edges  No odor  No drainage  Heels currently on mattress surface  Heels were offloaded off the mattress; I provided education on why it is necessary to offload heels  Heels assessed by peeling back at the toe the [place] hose  Patient was sensitive to this and expressed discomfort when I rolled back the left ELDON hose  Patient declines to turn  Wound/Skin Care Plan:   1  For right medial wound, cleanse with normal saline  Apply Cavilon barrier film to periwound  Cover with a bordered foam dressing  Change every 3 days and as needed  2  Once patient is discharged, patient to resume home regimen  3  Pressure redistribution cushion to chair  4  Turn and reposition patient as patient allows  5  Offload heels from mattress surface  6  Wound care nurse follow up  Discussed with Demetra Villalba RN, patient, and wife

## 2022-11-12 NOTE — PLAN OF CARE
Problem: Potential for Falls  Goal: Patient will remain free of falls  Description: INTERVENTIONS:  - Educate patient/family on patient safety including physical limitations  - Instruct patient to call for assistance with activity   - Consult OT/PT to assist with strengthening/mobility   - Keep Call bell within reach  - Keep bed low and locked with side rails adjusted as appropriate  - Keep care items and personal belongings within reach  - Initiate and maintain comfort rounds  - Make Fall Risk Sign visible to staff  - Offer Toileting every 2 Hours, in advance of need  - Initiate/Maintain bed/chair alarm  - Apply yellow socks and bracelet for high fall risk patients  - Consider moving patient to room near nurses station  Outcome: Progressing     Problem: Prexisting or High Potential for Compromised Skin Integrity  Goal: Skin integrity is maintained or improved  Description: INTERVENTIONS:  - Identify patients at risk for skin breakdown  - Assess and monitor skin integrity  - Assess and monitor nutrition and hydration status  - Monitor labs   - Assess for incontinence   - Turn and reposition patient  - Assist with mobility/ambulation  - Relieve pressure over bony prominences  - Avoid friction and shearing  - Provide appropriate hygiene as needed including keeping skin clean and dry  - Evaluate need for skin moisturizer/barrier cream  - Collaborate with interdisciplinary team   - Patient/family teaching  - Consider wound care consult   Outcome: Progressing     Problem: MOBILITY - ADULT  Goal: Maintain or return to baseline ADL function  Description: INTERVENTIONS:  -  Assess patient's ability to carry out ADLs; assess patient's baseline for ADL function and identify physical deficits which impact ability to perform ADLs (bathing, care of mouth/teeth, toileting, grooming, dressing, etc )  - Assess/evaluate cause of self-care deficits   - Assess range of motion  - Assess patient's mobility; develop plan if impaired  - Assess patient's need for assistive devices and provide as appropriate  - Encourage maximum independence but intervene and supervise when necessary  - Involve family in performance of ADLs  - Assess for home care needs following discharge   - Consider OT consult to assist with ADL evaluation and planning for discharge  - Provide patient education as appropriate  Outcome: Progressing  Goal: Maintains/Returns to pre admission functional level  Description: INTERVENTIONS:  - Perform BMAT or MOVE assessment daily    - Set and communicate daily mobility goal to care team and patient/family/caregiver  - Collaborate with rehabilitation services on mobility goals if consulted  - Perform Range of Motion 3 times a day  - Reposition patient every 2 hours  - Dangle patient 3 times a day  - Stand patient 3 times a day  - Ambulate patient 3 times a day  - Out of bed to chair 3 times a day   - Out of bed for meals 3 times a day  - Out of bed for toileting  - Record patient progress and toleration of activity level   Outcome: Progressing     Problem: HEMATOLOGIC - ADULT  Goal: Maintains hematologic stability  Description: INTERVENTIONS  - Assess for signs and symptoms of bleeding or hemorrhage  - Monitor labs  - Administer supportive blood products/factors as ordered and appropriate  Outcome: Progressing     Problem: Nutrition/Hydration-ADULT  Goal: Nutrient/Hydration intake appropriate for improving, restoring or maintaining nutritional needs  Description: Monitor and assess patient's nutrition/hydration status for malnutrition  Collaborate with interdisciplinary team and initiate plan and interventions as ordered  Monitor patient's weight and dietary intake as ordered or per policy  Utilize nutrition screening tool and intervene as necessary  Determine patient's food preferences and provide high-protein, high-caloric foods as appropriate       INTERVENTIONS:  - Monitor oral intake, urinary output, labs, and treatment plans  - Assess nutrition and hydration status and recommend course of action  - Evaluate amount of meals eaten  - Assist patient with eating if necessary   - Allow adequate time for meals  - Recommend/ encourage appropriate diets, oral nutritional supplements, and vitamin/mineral supplements  - Order, calculate, and assess calorie counts as needed  - Recommend, monitor, and adjust tube feedings and TPN/PPN based on assessed needs  - Assess need for intravenous fluids  - Provide specific nutrition/hydration education as appropriate  - Include patient/family/caregiver in decisions related to nutrition  Outcome: Progressing

## 2022-11-12 NOTE — ASSESSMENT & PLAN NOTE
· Was on heparin to warfarin bridge  · Now therapeutic  · Continue warfarin 4 mg daily  · Continue sotalol

## 2022-11-12 NOTE — PLAN OF CARE
Problem: Potential for Falls  Goal: Patient will remain free of falls  Description: INTERVENTIONS:  - Educate patient/family on patient safety including physical limitations  - Instruct patient to call for assistance with activity   - Consult OT/PT to assist with strengthening/mobility   - Keep Call bell within reach  - Keep bed low and locked with side rails adjusted as appropriate  - Keep care items and personal belongings within reach  - Initiate and maintain comfort rounds  - Make Fall Risk Sign visible to staff  - Offer Toileting every  Hours, in advance of need  - Initiate/Maintain alarm  - Obtain necessary fall risk management equipment:   - Apply yellow socks and bracelet for high fall risk patients  - Consider moving patient to room near nurses station  Outcome: Progressing     Problem: Prexisting or High Potential for Compromised Skin Integrity  Goal: Skin integrity is maintained or improved  Description: INTERVENTIONS:  - Identify patients at risk for skin breakdown  - Assess and monitor skin integrity  - Assess and monitor nutrition and hydration status  - Monitor labs   - Assess for incontinence   - Turn and reposition patient  - Assist with mobility/ambulation  - Relieve pressure over bony prominences  - Avoid friction and shearing  - Provide appropriate hygiene as needed including keeping skin clean and dry  - Evaluate need for skin moisturizer/barrier cream  - Collaborate with interdisciplinary team   - Patient/family teaching  - Consider wound care consult   Outcome: Progressing     Problem: MOBILITY - ADULT  Goal: Maintain or return to baseline ADL function  Description: INTERVENTIONS:  -  Assess patient's ability to carry out ADLs; assess patient's baseline for ADL function and identify physical deficits which impact ability to perform ADLs (bathing, care of mouth/teeth, toileting, grooming, dressing, etc )  - Assess/evaluate cause of self-care deficits   - Assess range of motion  - Assess patient's mobility; develop plan if impaired  - Assess patient's need for assistive devices and provide as appropriate  - Encourage maximum independence but intervene and supervise when necessary  - Involve family in performance of ADLs  - Assess for home care needs following discharge   - Consider OT consult to assist with ADL evaluation and planning for discharge  - Provide patient education as appropriate  Outcome: Progressing  Goal: Maintains/Returns to pre admission functional level  Description: INTERVENTIONS:  - Perform BMAT or MOVE assessment daily    - Set and communicate daily mobility goal to care team and patient/family/caregiver  - Collaborate with rehabilitation services on mobility goals if consulted  - Perform Range of Motion  times a day  - Reposition patient every  hours  - Dangle patient  times a day  - Stand patient  times a day  - Ambulate patient  times a day  - Out of bed to chair  times a day   - Out of bed for meals times a day  - Out of bed for toileting  - Record patient progress and toleration of activity level   Outcome: Progressing     Problem: HEMATOLOGIC - ADULT  Goal: Maintains hematologic stability  Description: INTERVENTIONS  - Assess for signs and symptoms of bleeding or hemorrhage  - Monitor labs  - Administer supportive blood products/factors as ordered and appropriate  Outcome: Progressing     Problem: Nutrition/Hydration-ADULT  Goal: Nutrient/Hydration intake appropriate for improving, restoring or maintaining nutritional needs  Description: Monitor and assess patient's nutrition/hydration status for malnutrition  Collaborate with interdisciplinary team and initiate plan and interventions as ordered  Monitor patient's weight and dietary intake as ordered or per policy  Utilize nutrition screening tool and intervene as necessary  Determine patient's food preferences and provide high-protein, high-caloric foods as appropriate       INTERVENTIONS:  - Monitor oral intake, urinary output, labs, and treatment plans  - Assess nutrition and hydration status and recommend course of action  - Evaluate amount of meals eaten  - Assist patient with eating if necessary   - Allow adequate time for meals  - Recommend/ encourage appropriate diets, oral nutritional supplements, and vitamin/mineral supplements  - Order, calculate, and assess calorie counts as needed  - Recommend, monitor, and adjust tube feedings and TPN/PPN based on assessed needs  - Assess need for intravenous fluids  - Provide specific nutrition/hydration education as appropriate  - Include patient/family/caregiver in decisions related to nutrition  Outcome: Progressing     Problem: Nutrition/Hydration-ADULT  Goal: Nutrient/Hydration intake appropriate for improving, restoring or maintaining nutritional needs  Description: Monitor and assess patient's nutrition/hydration status for malnutrition  Collaborate with interdisciplinary team and initiate plan and interventions as ordered  Monitor patient's weight and dietary intake as ordered or per policy  Utilize nutrition screening tool and intervene as necessary  Determine patient's food preferences and provide high-protein, high-caloric foods as appropriate       INTERVENTIONS:  - Monitor oral intake, urinary output, labs, and treatment plans  - Assess nutrition and hydration status and recommend course of action  - Evaluate amount of meals eaten  - Assist patient with eating if necessary   - Allow adequate time for meals  - Recommend/ encourage appropriate diets, oral nutritional supplements, and vitamin/mineral supplements  - Order, calculate, and assess calorie counts as needed  - Recommend, monitor, and adjust tube feedings and TPN/PPN based on assessed needs  - Assess need for intravenous fluids  - Provide specific nutrition/hydration education as appropriate  - Include patient/family/caregiver in decisions related to nutrition  Outcome: Progressing

## 2022-11-12 NOTE — ASSESSMENT & PLAN NOTE
Pt complaining of abdominal cramping, with vomiting  Denies nausea  He vomited multiple times between 11/11/22 and 11/12/22  He felt mild relief with dicyclomine, and some relief with tigan  Plan:   -dicyclomine and tigan PRN for cramping and vomiting   -KUB - pending   -Hepatic function panel  -Improved symptoms, upgrade diet as tolerated   Currently on surgical lite diet

## 2022-11-12 NOTE — ASSESSMENT & PLAN NOTE
The patient required reduction of anticoagulation to facilitate his cholecystostomy tube resizing  Went through heparin to warfarin bridge  Now therapeutic

## 2022-11-12 NOTE — ASSESSMENT & PLAN NOTE
The patient required reduction of anticoagulation to facilitate his cholecystostomy tube resizing  He will need a theraputic INR of 2-3 before discharge, with heparin bridge  -INR still subtherapeutic

## 2022-11-13 LAB
ANION GAP SERPL CALCULATED.3IONS-SCNC: 7 MMOL/L (ref 4–13)
APTT PPP: 87 SECONDS (ref 23–37)
BUN SERPL-MCNC: 20 MG/DL (ref 5–25)
CALCIUM SERPL-MCNC: 8.9 MG/DL (ref 8.4–10.2)
CHLORIDE SERPL-SCNC: 106 MMOL/L (ref 96–108)
CO2 SERPL-SCNC: 26 MMOL/L (ref 21–32)
CREAT SERPL-MCNC: 0.99 MG/DL (ref 0.6–1.3)
ERYTHROCYTE [DISTWIDTH] IN BLOOD BY AUTOMATED COUNT: 19.8 % (ref 11.6–15.1)
FLUAV RNA RESP QL NAA+PROBE: NEGATIVE
FLUBV RNA RESP QL NAA+PROBE: NEGATIVE
GFR SERPL CREATININE-BSD FRML MDRD: 68 ML/MIN/1.73SQ M
GLUCOSE SERPL-MCNC: 109 MG/DL (ref 65–140)
GLUCOSE SERPL-MCNC: 121 MG/DL (ref 65–140)
GLUCOSE SERPL-MCNC: 141 MG/DL (ref 65–140)
GLUCOSE SERPL-MCNC: 144 MG/DL (ref 65–140)
GLUCOSE SERPL-MCNC: 170 MG/DL (ref 65–140)
HCT VFR BLD AUTO: 27.6 % (ref 36.5–49.3)
HGB BLD-MCNC: 8.5 G/DL (ref 12–17)
INR PPP: 2.01 (ref 0.84–1.19)
MCH RBC QN AUTO: 27.7 PG (ref 26.8–34.3)
MCHC RBC AUTO-ENTMCNC: 30.8 G/DL (ref 31.4–37.4)
MCV RBC AUTO: 90 FL (ref 82–98)
PLATELET # BLD AUTO: 346 THOUSANDS/UL (ref 149–390)
PMV BLD AUTO: 9.7 FL (ref 8.9–12.7)
POTASSIUM SERPL-SCNC: 3.7 MMOL/L (ref 3.5–5.3)
PROTHROMBIN TIME: 23 SECONDS (ref 11.6–14.5)
RBC # BLD AUTO: 3.07 MILLION/UL (ref 3.88–5.62)
RSV RNA RESP QL NAA+PROBE: NEGATIVE
SARS-COV-2 RNA RESP QL NAA+PROBE: NEGATIVE
SODIUM SERPL-SCNC: 139 MMOL/L (ref 135–147)
WBC # BLD AUTO: 5.49 THOUSAND/UL (ref 4.31–10.16)

## 2022-11-13 PROCEDURE — 0241U HB NFCT DS VIR RESP RNA 4 TRGT: CPT | Performed by: INTERNAL MEDICINE

## 2022-11-13 PROCEDURE — 85027 COMPLETE CBC AUTOMATED: CPT

## 2022-11-13 PROCEDURE — 82948 REAGENT STRIP/BLOOD GLUCOSE: CPT

## 2022-11-13 PROCEDURE — 99232 SBSQ HOSP IP/OBS MODERATE 35: CPT | Performed by: INTERNAL MEDICINE

## 2022-11-13 PROCEDURE — 85610 PROTHROMBIN TIME: CPT | Performed by: INTERNAL MEDICINE

## 2022-11-13 PROCEDURE — 85730 THROMBOPLASTIN TIME PARTIAL: CPT

## 2022-11-13 PROCEDURE — 80048 BASIC METABOLIC PNL TOTAL CA: CPT

## 2022-11-13 RX ORDER — WARFARIN SODIUM 4 MG/1
4 TABLET ORAL
Status: DISCONTINUED | OUTPATIENT
Start: 2022-11-13 | End: 2022-11-14 | Stop reason: HOSPADM

## 2022-11-13 RX ORDER — COLCHICINE 0.6 MG/1
0.6 TABLET ORAL DAILY
Status: COMPLETED | OUTPATIENT
Start: 2022-11-14 | End: 2022-11-14

## 2022-11-13 RX ORDER — ALLOPURINOL 100 MG/1
100 TABLET ORAL DAILY
Status: DISCONTINUED | OUTPATIENT
Start: 2022-11-14 | End: 2022-11-14 | Stop reason: HOSPADM

## 2022-11-13 RX ADMIN — GABAPENTIN 100 MG: 100 CAPSULE ORAL at 21:08

## 2022-11-13 RX ADMIN — DICLOFENAC SODIUM TOPICAL GEL, 1%, 2 G: 10 GEL TOPICAL at 09:22

## 2022-11-13 RX ADMIN — SODIUM CHLORIDE 5 ML: 9 INJECTION INTRAMUSCULAR; INTRAVENOUS; SUBCUTANEOUS at 09:21

## 2022-11-13 RX ADMIN — DICLOFENAC SODIUM TOPICAL GEL, 1%, 2 G: 10 GEL TOPICAL at 21:09

## 2022-11-13 RX ADMIN — PANTOPRAZOLE SODIUM 40 MG: 40 TABLET, DELAYED RELEASE ORAL at 05:07

## 2022-11-13 RX ADMIN — WARFARIN SODIUM 4 MG: 4 TABLET ORAL at 17:13

## 2022-11-13 RX ADMIN — OXYCODONE HYDROCHLORIDE 5 MG: 5 TABLET ORAL at 21:08

## 2022-11-13 RX ADMIN — COLCHICINE 0.6 MG: 0.6 TABLET ORAL at 09:21

## 2022-11-13 RX ADMIN — GABAPENTIN 100 MG: 100 CAPSULE ORAL at 09:21

## 2022-11-13 RX ADMIN — ACETAMINOPHEN 650 MG: 325 TABLET, FILM COATED ORAL at 05:07

## 2022-11-13 RX ADMIN — INSULIN LISPRO 2 UNITS: 100 INJECTION, SOLUTION INTRAVENOUS; SUBCUTANEOUS at 17:13

## 2022-11-13 RX ADMIN — GABAPENTIN 100 MG: 100 CAPSULE ORAL at 17:13

## 2022-11-13 RX ADMIN — SOTALOL HYDROCHLORIDE 80 MG: 80 TABLET ORAL at 09:23

## 2022-11-13 NOTE — DISCHARGE SUMMARY
[facility]  Discharge- Sampson Spruce 1935, 81 y o  male MRN: [id number]  Unit/Bed#: S -01 Encounter: [id number]  Primary Care Provider: Beatrice Dewitt DO   Date and time admitted to hospital: 11/4/2022  2:38 PM    * Acute on chronic cholecystitis  Assessment & Plan  · Patient has a history of acute cholecystitis s/p percutaneous cholecystostomy tube  · Now presenting with LFT elevation and right upper quadrant pain  · CT abdomen/pelvis: No acute findings in the chest abdomen or pelvis Transhepatic percutaneous cholecystostomy tube remains in place  Persistent gallbladder inflammatory changes  · Small amount of yellowish, cloudy discharge in bag    Plan:  · Cholecystostomy tube has been replaced and upsized by IR 11/10/22  · Stopped IV antibiotics  Completed 7 days of cefepime and flagyl  · Continue to monitor CMP    Abdominal cramping  Assessment & Plan  Pt complaining of abdominal cramping, with vomiting  Denies nausea  He vomited multiple times between 11/11/22 and 11/12/22  He felt mild relief with dicyclomine, and some relief with tigan  Plan:   -dicyclomine and tigan PRN for cramping and vomiting   -KUB - pending   -Hepatic function panel  -Improved symptoms, upgrade diet as tolerated  Currently on surgical lite diet    Subtherapeutic anticoagulation  Assessment & Plan  The patient required reduction of anticoagulation to facilitate his cholecystostomy tube resizing  Went through heparin to warfarin bridge  Now therapeutic    Leg pain  Assessment & Plan   The patient has been complaining of bilateral leg pain, likely secondary to his edema   Versus neuropathic pain  He describes it as a shooting pain, and that is exacerbated when he moves legs  Leg pain is improving with colchicine     -  Gabapentin to t i d  100 mg   - Continuing colchicine for suspicion gout affecting bilateral ankles until tomorrow   Transition to allopurinol  - initiated compression stockings  OBINNA (obstructive sleep apnea)  Assessment & Plan  · Continue cpap    Diastolic congestive heart failure [facility])  Assessment & Plan  Wt Readings from Last 3 Encounters:   11/14/22 130 kg (286 lb)   10/25/22 (!) 137 kg (302 lb)   10/24/22 (!) 138 kg (305 lb 3 2 oz)     ·  not currently diuresing  · Reassess volume status daily with daily weight and I/O         Stage 3a chronic kidney disease with elevated creatinine  Assessment & Plan  Lab Results   Component Value Date    EGFR 72 11/14/2022    EGFR 68 11/13/2022    EGFR 71 11/12/2022    CREATININE 0 95 11/14/2022    CREATININE 0 99 11/13/2022    CREATININE 0 96 11/12/2022   · Following BMP    Thrombophlebitis  Assessment & Plan  · S/p IVC filter   · Resuming warfarin as above, with heparin bridge  · Patient ordered for Doppler of lower extremities for lower extremity pain  ·   Unrevealing for lower extremities and right  Upper extremity, demonstrated left upper extremity thrombophlebitis  ·   Ordered warm compresses for thrombophlebitis      Paroxysmal atrial fibrillation (HCC)  Assessment & Plan  · Was on heparin to warfarin bridge  · Now therapeutic  · Continue warfarin 4 mg daily  · Continue sotalol    Medical Problems             Resolved Problems  Date Reviewed: 10/19/2022          Resolved    Aortic stenosis 11/4/2022     Resolved by  Felicia Piedra MD    Encephalopathy acute 11/4/2022     Resolved by  Felicia Piedra MD    Thoracic back pain 11/12/2022     Resolved by  Felicia Piedra MD              Discharging Resident: Felicia Piedra  Discharging Attending: Yamilet Hernández MD  PCP: Glenford Brunner, DO  Admission Date:   Admission Orders (From admission, onward)     Ordered        11/04/22 1843  INPATIENT ADMISSION  Once                      Discharge Date: 11/13/22    Consultations During Hospital Stay:  · General surgery, interventional radiology, gastroenterology     Procedures Performed:   · Cholecystostomy tube exchange      Significant Findings / Test Results:   XR chest 1 view portable  Result Date: 11/4/2022  Impression: No acute cardiopulmonary disease  Workstation performed: [id number]     CT head without contrast  Result Date: 11/4/2022  Impression: No acute intracranial abnormality  Workstation performed: [id number]     CT chest abdomen pelvis w contrast  Result Date: 11/4/2022  Impression: No acute findings in the chest abdomen or pelvis  Transhepatic percutaneous cholecystostomy tube remains in place  Persistent gallbladder inflammatory changes  Workstation performed: [id number]     IR cholecystostomy tube check/change/reposition/reinsertion/upsize  Result Date: 11/7/2022  Impression: Occluded percutaneous biliary drainage catheter due to stones sediment/debris which was partially cleared using a guidewire  Tube left in place for now to bag drainage  Tube exchange/upsizing can be performed once the patient's INR falls to less than 1 5  Workstation performed: [id number]       Incidental Findings:   · none    Test Results Pending at Discharge (will require follow up):  · none     Outpatient Tests Requested:  · cmp in a week    Complications:  None     Reason for Admission: Right upper quadrant pain and back pain  Hospital Course: Neema Deleon is a 81 y o  male patient with a PMH of atrial fibrillation on warfarin, DVT/PE s/p IVC filter, aortic valve replacement, dCHF, HTN, GERD, DM Type 2 and cholecystitis with placement of percutaneous cholecystostomy tube in 9/2022 who originally presented to the hospital on 11/4/2022 due to right upper quadrant pain and back pain  Patient had his cholecystostomy tube replaced on 10/19  He had woken up from a nap and was moaning in pain from RUQ and back pain with altered mental status  On presentation to the ED, his LFT's and INR were elevated, with a supratherapeutic INR (on warfarin)  Warfarin was held   CT abdomen pelvis demonstrated no acute findings, with cholecystostomy tube in appropriate position  Surgery and IR were consulted regarding cholecystitis, and GI was consulted regarding possible cholangitis  He was treated with ceftriaxone and flagyl for a full course, with symptomatic improvement  His altered mental status and RUQ pain improved shortly after initiating antibiotics, and his back pain did not persist beyond the day of admission  IR performed tube check and ultimately decided to up-size his cholecystostomy tube due to clogging from gal stone debris, with suggestion that percutaneous stone removal could be pursued as an outpatient  His warfarin was resumed after this procedure, with a heparin bridge  He then developed abdominal cramping and vomiting with meals in the following days  His vomiting and abdominal cramping improved by time of discharge  He also had bilateral tenderness to palpation of his calves and to manipulation of his ankles  Venous duplex ultrasound of lower and upper extremities revealed no DVT, with thrombophlebitis identified in the LUE, treated with warm compress  This pain improved with gabapentin and colchicine  Please see above list of diagnoses and related plan for additional information  Condition at Discharge: fair    Discharge Day Visit / Exam:   Subjective:  Pt seen and evaluated at bedside  Pt states his appetite has improved, and he is no longer vomiting after meals  He has no acute complaints  Vitals: Blood Pressure: 115/61 (11/13/22 1531)  Pulse: 67 (11/13/22 1531)  Temperature: 98 2 °F (36 8 °C) (11/13/22 1531)  Temp Source: Oral (11/12/22 2044)  Respirations: 17 (11/13/22 1531)  Height: 6' 1" (185 4 cm) (11/04/22 1447)  Weight - Scale: 133 kg (294 lb 1 5 oz) (11/06/22 0538)  SpO2: 97 % (11/13/22 1531)  Exam:   Physical Exam  Constitutional:       Appearance: Normal appearance  HENT:      Head: Normocephalic and atraumatic  Cardiovascular:      Rate and Rhythm: Normal rate and regular rhythm     Pulmonary: Effort: Pulmonary effort is normal       Breath sounds: Normal breath sounds  Abdominal:      General: Bowel sounds are normal       Palpations: Abdomen is soft  Tenderness: There is no abdominal tenderness  Comments: Cholecystostomy tube intact  Musculoskeletal:      Right lower leg: No edema  Left lower leg: No edema  Skin:     General: Skin is warm and dry  Neurological:      General: No focal deficit present  Mental Status: He is alert and oriented to person, place, and time  Psychiatric:         Mood and Affect: Mood normal          Behavior: Behavior normal           Discussion with Family: Updated  (wife) via phone  Discharge instructions/Information to patient and family:   See after visit summary for information provided to patient and family  Provisions for Follow-Up Care:  See after visit summary for information related to follow-up care and any pertinent home health orders  Disposition:   Other: subacute rehab at [address] Readmission: no    Discharge Medications:  See after visit summary for reconciled discharge medications provided to patient and/or family        **Please Note: This note may have been constructed using a voice recognition system**

## 2022-11-13 NOTE — CASE MANAGEMENT
Case Management Discharge Planning Note    Patient name Wenceslao Mcardle  Location S /S -79 MRN [id number]  : 1935 Date 2022       Current Admission Date: 2022  Current Admission Diagnosis:Acute on chronic cholecystitis   Patient Active Problem List    Diagnosis Date Noted   • Subtherapeutic anticoagulation 2022   • Abdominal cramping 2022   • Leg pain 2022   • Acute on chronic cholecystitis 2022   • Bacteremia 10/23/2022   • Acute cholecystitis 2022   • Benign prostatic hyperplasia with lower urinary tract symptoms 2022   • OBINNA (obstructive sleep apnea)    • Stage 3a chronic kidney disease with elevated creatinine 2022   • Acute kidney injury ([address] ) 2021   • Osteoarthritis, knee 11/15/2021   • Status post right knee replacement 11/15/2021   • Mixed hyperlipidemia 2021   • Swelling of limb 2021   • Chronic deep vein thrombosis (DVT) ([facility] ) 09/10/2020   • Secondary lymphedema 2018   • Venous ulcer of ankle, right ([address] ) 2018   • S/P AVR 2018   • Thrombophlebitis 2018   • Presence of implantable cardioverter-defibrillator (ICD) 2018   • Hematuria, gross 2017   • Chronic bilateral low back pain without sciatica 2017   • Compression fracture of L4 lumbar vertebra 2017   • BPH (benign prostatic hyperplasia) 2017   • Obesity, unspecified obesity severity, unspecified obesity type 2017   • Hydrocele 2017   • Chronic anticoagulation 2017   • Anemia 2017   • Renal cyst 2017   • Urinary tract infection, acute 2017   • Epididymitis 2017   • Cellulitis of scrotum 2017   • Facial cellulitis 2016   • Dental abscess 2016   • 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 2015   • Ventricular tachycardia 04/15/2014   • Paroxysmal atrial fibrillation ([address] ) 2014   • Edema 21/10/3822   • Diastolic congestive heart failure ([facility] ) 03/19/2014   • Endocarditis 03/19/2014   • Left knee pain 03/14/2014   • Other disorder of calcium metabolism 03/14/2014   • Type 2 diabetes mellitus with diabetic neuropathy, without long-term current use of insulin ([facility] ) 08/19/2013   • Peripheral venous insufficiency 08/19/2013   • Cardiomegaly 08/19/2013   • PVD (peripheral vascular disease) ([facility] ) 04/19/2013   • Dyslipidemia 04/19/2013      LOS (days): 9  Geometric Mean LOS (GMLOS) (days): 7 90  Days to GMLOS:-1 1     OBJECTIVE:  Risk of Unplanned Readmission Score: 23 79         Current admission status: Inpatient   Preferred Pharmacy:   [facility] , [address] [facility] [address] [place] [address] Rd  [address] [facility] [address]  Phone: [phone] Fax: [phone]    [address] ([place] [address]) [facility], [address] [place] [address] 63  [address] [place] [address]  Phone: [phone] Fax: [phone]    Primary Care Provider: Israel Cade DO    Primary Insurance: MEDICARE  Secondary Insurance: [facility] HEALTH OPTIONS PROGRAM    DISCHARGE DETAILS:    Discharge planning discussed with[de-identified] patient and spouse; Sara Álvarez at [address] [place] [address] Rd of Choice: Yes (re: rehab)  Comments - Freedom of Choice: [facility] contacted family/caregiver?: Yes  Were Treatment Team discharge recommendations reviewed with patient/caregiver?: Yes  Did patient/caregiver verbalize understanding of patient care needs?: N/A- going to facility  Were patient/caregiver advised of the risks associated with not following Treatment Team discharge recommendations?: Yes    Contacts  Patient Contacts: spouse, Paloma  Relationship to Patient[de-identified] Family  Contact Method:  In Person  Reason/Outcome: Continuity of Care, Emergency Contact, Referral, Discharge Planning              Other Referral/Resources/Interventions Provided:  Interventions: SNF, Short Term Rehab  Referral Comments: Call received from Ana Hubbard [phone] at [facility] relaying ability to offer bed for patient tomorrow AM  Requesting transfer at 11:30am and COVID swab within 24 hours of admission  Request for COVID test sent to MD and RN to swab patient this afternoon  Patient and spouse informed of bed availability for tomorrow, 11/14, and that transport will be requested for 11:30am  Transport request entered in [address] for 11:30am; same confirmed with [facility] notified of transport time via [employer] and MDs/RN notified via TT      Would you like to participate in our [address] service program?  : No - Declined    Treatment Team Recommendation: Short Term Rehab, SNF  Discharge Destination Plan[de-identified] SNF, Short Term Rehab [facility])  Transport at Discharge : [address]: Yes  Number/Name of Dispatcher: [employer]  Transported by [employer] and Unit #): [facility]  ETA of Transport (Date): 11/14/22  ETA of Transport (Time): 3054 (confirmed)              IMM Given (Date):: 11/13/22  IMM Given to[de-identified] Patient          Accepting Facility Name, [address] : [facility]  Receiving Facility/Agency Phone Number: [phone] ext [phone]  Facility/Agency Fax Number: [phone]

## 2022-11-13 NOTE — PROGRESS NOTES
[facility]  Progress Note - Michael Eugene 1935, 81 y o  male MRN: [id number]  Unit/Bed#: S -01 Encounter: [id number]  Primary Care Provider: Michelle Mckinney DO   Date and time admitted to hospital: 11/4/2022  2:38 PM    * Acute on chronic cholecystitis  Assessment & Plan  · Patient has a history of acute cholecystitis s/p percutaneous cholecystostomy tube  · Now presenting with LFT elevation and right upper quadrant pain  · CT abdomen/pelvis: No acute findings in the chest abdomen or pelvis Transhepatic percutaneous cholecystostomy tube remains in place  Persistent gallbladder inflammatory changes  · Small amounts of bloody discharge present in the Cholecystostomy bag  Plan:  · Cholecystostomy tube has been replaced and upsized by IR 11/10/22  · Stopped IV antibiotics  Completed 7 days of cefepime and flagyl  · Continue to monitor CMP    Thoracic back pain-resolved as of 11/12/2022  Assessment & Plan  · Consider MRI if patient's pain better controlled or increased concern for bone infection  · He did not complain of back pain on 11/05/2022 - 11/10/2022    Abdominal cramping  Assessment & Plan  Pt complaining of abdominal cramping, with vomiting  Denies nausea  He vomited multiple times between 11/11/22 and 11/12/22  He felt mild relief with dicyclomine, and some relief with tigan  Plan:   -dicyclomine and tigan PRN for cramping and vomiting   -KUB - pending   -Hepatic function panel  -Improved symptoms, upgrade diet as tolerated  Currently on surgical lite diet    Subtherapeutic anticoagulation  Assessment & Plan  The patient required reduction of anticoagulation to facilitate his cholecystostomy tube resizing  Went through heparin to warfarin bridge  Now therapeutic    Leg pain  Assessment & Plan   The patient has been complaining of bilateral leg pain, likely secondary to his edema   Versus neuropathic pain    He describes it as a shooting pain, and that is exacerbated when he moves legs  Leg pain is improving with colchicine     -  Gabapentin to t i d  100 mg   - Continuing colchicine for suspicion gout affecting bilateral ankles until tomorrow  Transition to allopurinol  - initiated compression stockings  OBINNA (obstructive sleep apnea)  Assessment & Plan  · Continue cpap    Diastolic congestive heart failure [facility])  Assessment & Plan  Wt Readings from Last 3 Encounters:   10/25/22 (!) 137 kg (302 lb)   10/24/22 (!) 138 kg (305 lb 3 2 oz)   09/26/22 (!) 139 kg (307 lb)     ·  not currently diuresing  · Reassess volume status daily with daily weight and I/O         Stage 3a chronic kidney disease with elevated creatinine  Assessment & Plan  Lab Results   Component Value Date    EGFR 71 11/12/2022    EGFR 60 11/11/2022    EGFR 51 11/10/2022    CREATININE 0 96 11/12/2022    CREATININE 1 10 11/11/2022    CREATININE 1 26 11/10/2022   · Following BMP    Thrombophlebitis  Assessment & Plan  · S/p IVC filter   · Resuming warfarin as above, with heparin bridge  · Patient ordered for Doppler of lower extremities for lower extremity pain  ·   Unrevealing for lower extremities and right  Upper extremity, demonstrated left upper extremity thrombophlebitis  ·   Ordered warm compresses for thrombophlebitis  Paroxysmal atrial fibrillation (HCC)  Assessment & Plan  · Was on heparin to warfarin bridge  · Now therapeutic  · Continue warfarin 4 mg daily  · Continue sotalol      VTE Pharmacologic Prophylaxis: VTE Score: 3 Moderate Risk (Score 3-4) - Pharmacological DVT Prophylaxis Ordered: warfarin (Coumadin)  Patient Centered Rounds: I performed bedside rounds with nursing staff today  Discussions with Specialists or Other Care Team Provider:     Education and Discussions with Family / Patient: Updated  (wife) at bedside      Current Length of Stay: 9 day(s)  Current Patient Status: Inpatient   Discharge Plan: Anticipate discharge tomorrow to rehab facility  Code Status: Level 1 - Full Code    Subjective:   Patient feeling much improved today  Objective:     Vitals:   Temp (24hrs), Av 8 °F (36 6 °C), Min:97 3 °F (36 3 °C), Max:98 2 °F (36 8 °C)    Temp:  [97 3 °F (36 3 °C)-98 2 °F (36 8 °C)] 98 2 °F (36 8 °C)  HR:  [67-85] 67  Resp:  [16-18] 17  BP: (115-128)/(61-69) 115/61  SpO2:  [90 %-99 %] 97 %  Body mass index is 38 8 kg/m²  Input and Output Summary (last 24 hours): Intake/Output Summary (Last 24 hours) at 2022 1555  Last data filed at 2022 0857  Gross per 24 hour   Intake 820 ml   Output 275 ml   Net 545 ml       Physical Exam:   Physical Exam  Constitutional:       Appearance: Normal appearance  HENT:      Head: Normocephalic and atraumatic  Cardiovascular:      Rate and Rhythm: Normal rate and regular rhythm  Pulmonary:      Effort: Pulmonary effort is normal       Breath sounds: Normal breath sounds  Abdominal:      General: Bowel sounds are normal       Palpations: Abdomen is soft  Tenderness: There is no abdominal tenderness  Comments: Cholecystostomy tube intact  Musculoskeletal:      Right lower leg: No edema  Left lower leg: No edema  Skin:     General: Skin is warm and dry  Neurological:      General: No focal deficit present  Mental Status: He is alert and oriented to person, place, and time     Psychiatric:         Mood and Affect: Mood normal          Behavior: Behavior normal           Additional Data:     Labs:  Results from last 7 days   Lab Units 22  0505   WBC Thousand/uL 5 49   HEMOGLOBIN g/dL 8 5*   HEMATOCRIT % 27 6*   PLATELETS Thousands/uL 346     Results from last 7 days   Lab Units 22  0504 22  0543   SODIUM mmol/L 139 139   POTASSIUM mmol/L 3 7 3 7   CHLORIDE mmol/L 106 106   CO2 mmol/L 26 23   BUN mg/dL 20 23   CREATININE mg/dL 0 99 0 96   ANION GAP mmol/L 7 10   CALCIUM mg/dL 8 9 9 3   ALBUMIN g/dL  --  3 1*   TOTAL BILIRUBIN mg/dL  --  0 62   ALK PHOS U/L  --  88   ALT U/L  --  11   AST U/L  --  22   GLUCOSE RANDOM mg/dL 109 132     Results from last 7 days   Lab Units 11/13/22  0718   INR  2 01*     Results from last 7 days   Lab Units 11/13/22  1129 11/13/22  0759 11/12/22  2045 11/12/22  1555 11/12/22  1054 11/12/22  0733 11/11/22  2108 11/11/22  1558 11/11/22  1141 11/11/22  0736 11/10/22  2149 11/10/22  1603   POC GLUCOSE mg/dl 141* 121 124 163* 159* 132 137 145* 145* 121 144* 151*               Lines/Drains:  Invasive Devices  Report    Peripheral Intravenous Line  Duration           Peripheral IV 11/11/22 Distal;Right;Ventral (anterior) Forearm 2 days          Drain  Duration           Cholecystostomy Tube 3 days              Imaging: No pertinent imaging reviewed      Recent Cultures (last 7 days):         Last 24 Hours Medication List:   Current Facility-Administered Medications   Medication Dose Route Frequency Provider Last Rate   • acetaminophen  650 mg Oral Q6H [address] Holly Chong, DO     • [START ON 11/14/2022] allopurinol  100 mg Oral Daily Holly Schwarz, DO     • bisacodyl  10 mg Rectal Daily PRN Holly Schwarz, DO     • [START ON 11/14/2022] colchicine  0 6 mg Oral Daily Holly Schwarz, DO     • Diclofenac Sodium  2 g Topical 4x Daily Holly Schwarz, DO     • dicyclomine  10 mg Oral TID PRN Bettie Castle MD     • gabapentin  100 mg Oral TID Bettie Castle MD     • HYDROmorphone  0 2 mg Intravenous Q4H PRN Holly Schwarz, DO     • insulin lispro  2-12 Units Subcutaneous 4x Daily (AC & HS) Anant Olmos DO     • oxyCODONE  2 5 mg Oral Q6H PRN Rebecca Hopson DO     • oxyCODONE  5 mg Oral Q6H PRN Mereric Hopson, DO     • pantoprazole  40 mg Oral Early Morning Holly Schwarz, DO     • polyethylene glycol  17 g Oral Daily PRN Holly Schwarz, DO     • sodium chloride (PF)  5 mL Intracatheter Daily Bettie Castle MD     • sotalol  80 mg Oral Daily Holly Schwarz DO     • trimethobenzamide  200 mg Intramuscular Q6H PRN Urbano Robles DO • warfarin  4 mg Oral Daily (warfarin) Josafat King, DO          Today, Patient Was Seen By: Josafat King    **Please Note: This note may have been constructed using a voice recognition system  **

## 2022-11-13 NOTE — PLAN OF CARE
Problem: Potential for Falls  Goal: Patient will remain free of falls  Description: INTERVENTIONS:  - Educate patient/family on patient safety including physical limitations  - Instruct patient to call for assistance with activity   - Consult OT/PT to assist with strengthening/mobility   - Keep Call bell within reach  - Keep bed low and locked with side rails adjusted as appropriate  - Keep care items and personal belongings within reach  - Initiate and maintain comfort rounds  - Make Fall Risk Sign visible to staff  - Offer Toileting every 2 Hours, in advance of need  - Apply yellow socks and bracelet for high fall risk patients  - Consider moving patient to room near nurses station  Outcome: Progressing     Problem: Prexisting or High Potential for Compromised Skin Integrity  Goal: Skin integrity is maintained or improved  Description: INTERVENTIONS:  - Identify patients at risk for skin breakdown  - Assess and monitor skin integrity  - Assess and monitor nutrition and hydration status  - Monitor labs   - Assess for incontinence   - Turn and reposition patient  - Assist with mobility/ambulation  - Relieve pressure over bony prominences  - Avoid friction and shearing  - Provide appropriate hygiene as needed including keeping skin clean and dry  - Evaluate need for skin moisturizer/barrier cream  - Collaborate with interdisciplinary team   - Patient/family teaching  - Consider wound care consult   Outcome: Progressing     Problem: MOBILITY - ADULT  Goal: Maintain or return to baseline ADL function  Description: INTERVENTIONS:  -  Assess patient's ability to carry out ADLs; assess patient's baseline for ADL function and identify physical deficits which impact ability to perform ADLs (bathing, care of mouth/teeth, toileting, grooming, dressing, etc )  - Assess/evaluate cause of self-care deficits   - Assess range of motion  - Assess patient's mobility; develop plan if impaired  - Assess patient's need for assistive devices and provide as appropriate  - Encourage maximum independence but intervene and supervise when necessary  - Involve family in performance of ADLs  - Assess for home care needs following discharge   - Consider OT consult to assist with ADL evaluation and planning for discharge  - Provide patient education as appropriate  Outcome: Progressing  Goal: Maintains/Returns to pre admission functional level  Description: INTERVENTIONS:  - Perform BMAT or MOVE assessment daily    - Set and communicate daily mobility goal to care team and patient/family/caregiver  - Collaborate with rehabilitation services on mobility goals if consulted  - Perform Range of Motion 3 times a day  - Reposition patient every 2 hours    - Dangle patient 3 times a day  - Stand patient 3 times a day  - Ambulate patient 3 times a day  - Out of bed to chair 3 times a day   - Out of bed for meals 3 times a day  - Out of bed for toileting  - Record patient progress and toleration of activity level   Outcome: Progressing     Problem: HEMATOLOGIC - ADULT  Goal: Maintains hematologic stability  Description: INTERVENTIONS  - Assess for signs and symptoms of bleeding or hemorrhage  - Monitor labs  - Administer supportive blood products/factors as ordered and appropriate  Outcome: Progressing

## 2022-11-13 NOTE — CASE MANAGEMENT
Case Management Discharge Planning Note    Patient name Anusha Washington  Location S /S -47 MRN [id number]  : 1935 Date 2022       Current Admission Date: 2022  Current Admission Diagnosis:Acute on chronic cholecystitis   Patient Active Problem List    Diagnosis Date Noted   • Subtherapeutic anticoagulation 2022   • Abdominal cramping 2022   • Leg pain 2022   • Acute on chronic cholecystitis 2022   • Bacteremia 10/23/2022   • Acute cholecystitis 2022   • Benign prostatic hyperplasia with lower urinary tract symptoms 2022   • OBINNA (obstructive sleep apnea)    • Stage 3a chronic kidney disease with elevated creatinine 2022   • Acute kidney injury ([address] ) 2021   • Osteoarthritis, knee 11/15/2021   • Status post right knee replacement 11/15/2021   • Mixed hyperlipidemia 2021   • Swelling of limb 2021   • Chronic deep vein thrombosis (DVT) ([address] ) 09/10/2020   • Secondary lymphedema 2018   • Venous ulcer of ankle, right ([address] ) 2018   • S/P AVR 2018   • Thrombophlebitis 2018   • Presence of implantable cardioverter-defibrillator (ICD) 2018   • Hematuria, gross 2017   • Chronic bilateral low back pain without sciatica 2017   • Compression fracture of L4 lumbar vertebra 2017   • BPH (benign prostatic hyperplasia) 2017   • Obesity, unspecified obesity severity, unspecified obesity type 2017   • Hydrocele 2017   • Chronic anticoagulation 2017   • Anemia 2017   • Renal cyst 2017   • Urinary tract infection, acute 2017   • Epididymitis 2017   • Cellulitis of scrotum 2017   • Facial cellulitis 2016   • Dental abscess 2016   • 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 2015   • Ventricular tachycardia 04/15/2014   • Paroxysmal atrial fibrillation ([address] ) 2014   • Edema    • Diastolic congestive heart failure ([facility] ) 03/19/2014   • Endocarditis 03/19/2014   • Left knee pain 03/14/2014   • Other disorder of calcium metabolism 03/14/2014   • Type 2 diabetes mellitus with diabetic neuropathy, without long-term current use of insulin ([facility] ) 08/19/2013   • Peripheral venous insufficiency 08/19/2013   • Cardiomegaly 08/19/2013   • PVD (peripheral vascular disease) ([facility] ) 04/19/2013   • Dyslipidemia 04/19/2013      LOS (days): 9  Geometric Mean LOS (GMLOS) (days): 7 90  Days to GMLOS:-0 7     OBJECTIVE:  Risk of Unplanned Readmission Score: 24 14         Current admission status: Inpatient   Preferred Pharmacy:   [place] [address] [address] [place] [address] Rd  [address] Count includes the [facility] [address]  Phone: [phone] Fax: Patricia [address] [address]  Phone: [phone] Fax: [phone]    Primary Care Provider: Dev Velázquez DO    Primary Insurance: MEDICARE  Secondary Insurance: [facility] HEALTH OPTIONS PROGRAM    DISCHARGE DETAILS:    Discharge planning discussed with[de-identified] patient and spouse at bedside  Freedom of Choice: Yes (re: rehab)  Comments - Freedom of Choice: accepted bed at [address] contacted family/caregiver?: Yes  Were Treatment Team discharge recommendations reviewed with patient/caregiver?: Yes  Did patient/caregiver verbalize understanding of patient care needs?: N/A- going to facility  Were patient/caregiver advised of the risks associated with not following Treatment Team discharge recommendations?: Yes    Contacts  Patient Contacts: spouse, Paloma  Relationship to Patient[de-identified] Family  Contact Method:  In Person  Reason/Outcome: Continuity of Care, Emergency Contact, Referral, Discharge [address]         Is the patient interested in [facility] at discharge?: No    DME Referral Provided  Referral made for DME?: No    Other Referral/Resources/Interventions Provided:  Interventions: SNF, Short Term Rehab  Referral Comments: Per MD, patient is likely medically stable for d/c today - inquiring about family decision re: rehab vs home with home care  Per response in [address] [place] [address], [facility] able to offer a bed for Monday; message sent to see if they could accept patient today; awaiting response  Met with patient and spouse, Daniele Olson, at bedside to review options for d/c and both confirm wish for him to go to rehab at [facility]  Patient aware that per prior responses from [facility], bed was anticipated for Monday, but did inquire about availability for today  Patient confirmed agreement with discharge to [facility] once bed is available and aware that update will be provided once response received from facility  IMM reviewed with patient which he signed; copy provided  Patient will need wheelchair transport to [facility]  Would you like to participate in our [address] service program?  : No - Declined    Treatment Team Recommendation: Short Term Rehab, SNF  Discharge Destination Plan[de-identified] Short Term Rehab, SNF  Transport at Discharge : [facility]                             IMM Given (Date):: 11/13/22  IMM Given to[de-identified] Patient  Family notified[de-identified] spouseDaniele  IMM reviewed with patient and spouse, both agree with discharge determination

## 2022-11-14 VITALS
DIASTOLIC BLOOD PRESSURE: 66 MMHG | SYSTOLIC BLOOD PRESSURE: 117 MMHG | WEIGHT: 286 LBS | OXYGEN SATURATION: 100 % | RESPIRATION RATE: 17 BRPM | BODY MASS INDEX: 37.91 KG/M2 | HEIGHT: 73 IN | HEART RATE: 68 BPM | TEMPERATURE: 97.4 F

## 2022-11-14 LAB
ALBUMIN SERPL BCP-MCNC: 3.2 G/DL (ref 3.5–5)
ALP SERPL-CCNC: 84 U/L (ref 34–104)
ALT SERPL W P-5'-P-CCNC: 13 U/L (ref 7–52)
ANION GAP SERPL CALCULATED.3IONS-SCNC: 9 MMOL/L (ref 4–13)
AST SERPL W P-5'-P-CCNC: 33 U/L (ref 13–39)
BILIRUB SERPL-MCNC: 0.66 MG/DL (ref 0.2–1)
BUN SERPL-MCNC: 17 MG/DL (ref 5–25)
CALCIUM ALBUM COR SERPL-MCNC: 10.1 MG/DL (ref 8.3–10.1)
CALCIUM SERPL-MCNC: 9.5 MG/DL (ref 8.4–10.2)
CHLORIDE SERPL-SCNC: 107 MMOL/L (ref 96–108)
CO2 SERPL-SCNC: 23 MMOL/L (ref 21–32)
CREAT SERPL-MCNC: 0.95 MG/DL (ref 0.6–1.3)
ERYTHROCYTE [DISTWIDTH] IN BLOOD BY AUTOMATED COUNT: 20 % (ref 11.6–15.1)
GFR SERPL CREATININE-BSD FRML MDRD: 72 ML/MIN/1.73SQ M
GLUCOSE SERPL-MCNC: 122 MG/DL (ref 65–140)
GLUCOSE SERPL-MCNC: 132 MG/DL (ref 65–140)
GLUCOSE SERPL-MCNC: 174 MG/DL (ref 65–140)
HCT VFR BLD AUTO: 30.7 % (ref 36.5–49.3)
HGB BLD-MCNC: 9.1 G/DL (ref 12–17)
INR PPP: 2.21 (ref 0.84–1.19)
MCH RBC QN AUTO: 26.8 PG (ref 26.8–34.3)
MCHC RBC AUTO-ENTMCNC: 29.6 G/DL (ref 31.4–37.4)
MCV RBC AUTO: 91 FL (ref 82–98)
PLATELET # BLD AUTO: 373 THOUSANDS/UL (ref 149–390)
PMV BLD AUTO: 9.5 FL (ref 8.9–12.7)
POTASSIUM SERPL-SCNC: 3.9 MMOL/L (ref 3.5–5.3)
PROT SERPL-MCNC: 6.7 G/DL (ref 6.4–8.4)
PROTHROMBIN TIME: 24.8 SECONDS (ref 11.6–14.5)
RBC # BLD AUTO: 3.39 MILLION/UL (ref 3.88–5.62)
SODIUM SERPL-SCNC: 139 MMOL/L (ref 135–147)
WBC # BLD AUTO: 8.05 THOUSAND/UL (ref 4.31–10.16)

## 2022-11-14 PROCEDURE — 82948 REAGENT STRIP/BLOOD GLUCOSE: CPT

## 2022-11-14 PROCEDURE — 85610 PROTHROMBIN TIME: CPT | Performed by: INTERNAL MEDICINE

## 2022-11-14 PROCEDURE — 85027 COMPLETE CBC AUTOMATED: CPT | Performed by: INTERNAL MEDICINE

## 2022-11-14 PROCEDURE — 99239 HOSP IP/OBS DSCHRG MGMT >30: CPT | Performed by: INTERNAL MEDICINE

## 2022-11-14 PROCEDURE — 80053 COMPREHEN METABOLIC PANEL: CPT | Performed by: INTERNAL MEDICINE

## 2022-11-14 RX ORDER — BISACODYL 10 MG
10 SUPPOSITORY, RECTAL RECTAL DAILY PRN
Qty: 12 SUPPOSITORY | Refills: 0 | Status: CANCELLED
Start: 2022-11-14

## 2022-11-14 RX ORDER — ALLOPURINOL 100 MG/1
100 TABLET ORAL DAILY
Refills: 0 | Status: CANCELLED
Start: 2022-11-15

## 2022-11-14 RX ORDER — DICYCLOMINE HYDROCHLORIDE 10 MG/1
10 CAPSULE ORAL 3 TIMES DAILY PRN
Refills: 0 | Status: CANCELLED
Start: 2022-11-14

## 2022-11-14 RX ORDER — SODIUM CHLORIDE 9 MG/ML
5 INJECTION INTRAVENOUS DAILY
Qty: 150 ML | Refills: 1
Start: 2022-11-14 | End: 2023-01-13

## 2022-11-14 RX ORDER — ALLOPURINOL 100 MG/1
100 TABLET ORAL DAILY
Start: 2022-11-15 | End: 2023-02-13

## 2022-11-14 RX ADMIN — ACETAMINOPHEN 650 MG: 325 TABLET, FILM COATED ORAL at 04:30

## 2022-11-14 RX ADMIN — PANTOPRAZOLE SODIUM 40 MG: 40 TABLET, DELAYED RELEASE ORAL at 04:30

## 2022-11-14 RX ADMIN — GABAPENTIN 100 MG: 100 CAPSULE ORAL at 08:32

## 2022-11-14 RX ADMIN — ALLOPURINOL 100 MG: 100 TABLET ORAL at 08:32

## 2022-11-14 RX ADMIN — SOTALOL HYDROCHLORIDE 80 MG: 80 TABLET ORAL at 08:32

## 2022-11-14 RX ADMIN — DICLOFENAC SODIUM TOPICAL GEL, 1%, 2 G: 10 GEL TOPICAL at 08:39

## 2022-11-14 RX ADMIN — COLCHICINE 0.6 MG: 0.6 TABLET ORAL at 08:32

## 2022-11-14 RX ADMIN — SODIUM CHLORIDE 5 ML: 9 INJECTION INTRAMUSCULAR; INTRAVENOUS; SUBCUTANEOUS at 08:32

## 2022-11-14 NOTE — ASSESSMENT & PLAN NOTE
Wt Readings from Last 3 Encounters:   11/14/22 130 kg (286 lb)   10/25/22 (!) 137 kg (302 lb)   10/24/22 (!) 138 kg (305 lb 3 2 oz)     ·  not currently diuresing    · Reassess volume status daily with daily weight and I/O

## 2022-11-14 NOTE — ASSESSMENT & PLAN NOTE
Lab Results   Component Value Date    EGFR 72 11/14/2022    EGFR 68 11/13/2022    EGFR 71 11/12/2022    CREATININE 0 95 11/14/2022    CREATININE 0 99 11/13/2022    CREATININE 0 96 11/12/2022   · Following BMP

## 2022-11-14 NOTE — ASSESSMENT & PLAN NOTE
· Patient has a history of acute cholecystitis s/p percutaneous cholecystostomy tube  · Now presenting with LFT elevation and right upper quadrant pain  · CT abdomen/pelvis: No acute findings in the chest abdomen or pelvis Transhepatic percutaneous cholecystostomy tube remains in place  Persistent gallbladder inflammatory changes  · Small amount of yellowish, cloudy discharge in bag    Plan:  · Cholecystostomy tube has been replaced and upsized by IR 11/10/22  · Stopped IV antibiotics  Completed 7 days of cefepime and flagyl     · Continue to monitor CMP

## 2022-11-14 NOTE — PLAN OF CARE
Problem: Potential for Falls  Goal: Patient will remain free of falls  Description: INTERVENTIONS:  - Educate patient/family on patient safety including physical limitations  - Instruct patient to call for assistance with activity   - Consult OT/PT to assist with strengthening/mobility   - Keep Call bell within reach  - Keep bed low and locked with side rails adjusted as appropriate  - Keep care items and personal belongings within reach  - Initiate and maintain comfort rounds  - Make Fall Risk Sign visible to staff  - Offer Toileting every 2 Hours, in advance of need  - Initiate/Maintain bed alarm  - Obtain necessary fall risk management equipment: bed alarm  - Apply yellow socks and bracelet for high fall risk patients  - Consider moving patient to room near nurses station  Outcome: Progressing     Problem: Prexisting or High Potential for Compromised Skin Integrity  Goal: Skin integrity is maintained or improved  Description: INTERVENTIONS:  - Identify patients at risk for skin breakdown  - Assess and monitor skin integrity  - Assess and monitor nutrition and hydration status  - Monitor labs   - Assess for incontinence   - Turn and reposition patient  - Assist with mobility/ambulation  - Relieve pressure over bony prominences  - Avoid friction and shearing  - Provide appropriate hygiene as needed including keeping skin clean and dry  - Evaluate need for skin moisturizer/barrier cream  - Collaborate with interdisciplinary team   - Patient/family teaching  - Consider wound care consult   Outcome: Progressing     Problem: MOBILITY - ADULT  Goal: Maintain or return to baseline ADL function  Description: INTERVENTIONS:  -  Assess patient's ability to carry out ADLs; assess patient's baseline for ADL function and identify physical deficits which impact ability to perform ADLs (bathing, care of mouth/teeth, toileting, grooming, dressing, etc )  - Assess/evaluate cause of self-care deficits   - Assess range of motion  - Assess patient's mobility; develop plan if impaired  - Assess patient's need for assistive devices and provide as appropriate  - Encourage maximum independence but intervene and supervise when necessary  - Involve family in performance of ADLs  - Assess for home care needs following discharge   - Consider OT consult to assist with ADL evaluation and planning for discharge  - Provide patient education as appropriate  Outcome: Progressing  Goal: Maintains/Returns to pre admission functional level  Description: INTERVENTIONS:  - Perform BMAT or MOVE assessment daily    - Set and communicate daily mobility goal to care team and patient/family/caregiver  - Collaborate with rehabilitation services on mobility goals if consulted  - Perform Range of Motion 2 times a day  - Reposition patient every 2 hours    - Dangle patient 2 times a day  - Stand patient 2 times a day  - Ambulate patient 2 times a day  - Out of bed to chair 2 times a day   - Out of bed for meals 2 times a day  - Out of bed for toileting  - Record patient progress and toleration of activity level   Outcome: Progressing     Problem: HEMATOLOGIC - ADULT  Goal: Maintains hematologic stability  Description: INTERVENTIONS  - Assess for signs and symptoms of bleeding or hemorrhage  - Monitor labs  - Administer supportive blood products/factors as ordered and appropriate  Outcome: Progressing

## 2022-11-14 NOTE — CASE MANAGEMENT
Case Management Progress Note    Patient name Bonny Ford  Location S /S -35 MRN [id number]  : 1935 Date 2022       LOS (days): 10  Geometric Mean LOS (GMLOS) (days): 7 90  Days to GMLOS:-1 7        OBJECTIVE:        Current admission status: Inpatient  Preferred Pharmacy:   [address]  [address] [facility] [address]  Phone: [phone] Fax: [phone] [address] ([place]) - [place] [address]  Phone: [phone] Fax: [phone]    Primary Care Provider: Chester Holland DO    Primary Insurance: MEDICARE  Secondary Insurance: [facility] HEALTH OPTIONS PROGRAM    PROGRESS NOTE:    Patient remains medically stable for dc today  Cm confirmed via Aidin that patient is stable and that transport is set for 11:30am today  Cm informed patient, family and RN about dc time

## 2022-11-14 NOTE — PLAN OF CARE
Problem: Potential for Falls  Goal: Patient will remain free of falls  Description: INTERVENTIONS:  - Educate patient/family on patient safety including physical limitations  - Instruct patient to call for assistance with activity   - Consult OT/PT to assist with strengthening/mobility   - Keep Call bell within reach  - Keep bed low and locked with side rails adjusted as appropriate  - Keep care items and personal belongings within reach  - Initiate and maintain comfort rounds  - Make Fall Risk Sign visible to staff  - Offer Toileting every 2 Hours, in advance of need  - Apply yellow socks and bracelet for high fall risk patients  - Consider moving patient to room near nurses station  Outcome: Progressing     Problem: Prexisting or High Potential for Compromised Skin Integrity  Goal: Skin integrity is maintained or improved  Description: INTERVENTIONS:  - Identify patients at risk for skin breakdown  - Assess and monitor skin integrity  - Assess and monitor nutrition and hydration status  - Monitor labs   - Assess for incontinence   - Turn and reposition patient  - Assist with mobility/ambulation  - Relieve pressure over bony prominences  - Avoid friction and shearing  - Provide appropriate hygiene as needed including keeping skin clean and dry  - Evaluate need for skin moisturizer/barrier cream  - Collaborate with interdisciplinary team   - Patient/family teaching  - Consider wound care consult   Outcome: Progressing     Problem: MOBILITY - ADULT  Goal: Maintain or return to baseline ADL function  Description: INTERVENTIONS:  -  Assess patient's ability to carry out ADLs; assess patient's baseline for ADL function and identify physical deficits which impact ability to perform ADLs (bathing, care of mouth/teeth, toileting, grooming, dressing, etc )  - Assess/evaluate cause of self-care deficits   - Assess range of motion  - Assess patient's mobility; develop plan if impaired  - Assess patient's need for assistive devices and provide as appropriate  - Encourage maximum independence but intervene and supervise when necessary  - Involve family in performance of ADLs  - Assess for home care needs following discharge   - Consider OT consult to assist with ADL evaluation and planning for discharge  - Provide patient education as appropriate  Outcome: Progressing  Goal: Maintains/Returns to pre admission functional level  Description: INTERVENTIONS:  - Perform BMAT or MOVE assessment daily    - Set and communicate daily mobility goal to care team and patient/family/caregiver  - Collaborate with rehabilitation services on mobility goals if consulted  - Perform Range of Motion 3 times a day  - Reposition patient every 2 hours  - Dangle patient 3 times a day  - Stand patient 3 times a day  - Ambulate patient 3 times a day  - Out of bed to chair 3 times a day   - Out of bed for meals 3 times a day  - Out of bed for toileting  - Record patient progress and toleration of activity level   Outcome: Progressing     Problem: HEMATOLOGIC - ADULT  Goal: Maintains hematologic stability  Description: INTERVENTIONS  - Assess for signs and symptoms of bleeding or hemorrhage  - Monitor labs  - Administer supportive blood products/factors as ordered and appropriate  Outcome: Progressing     Problem: Nutrition/Hydration-ADULT  Goal: Nutrient/Hydration intake appropriate for improving, restoring or maintaining nutritional needs  Description: Monitor and assess patient's nutrition/hydration status for malnutrition  Collaborate with interdisciplinary team and initiate plan and interventions as ordered  Monitor patient's weight and dietary intake as ordered or per policy  Utilize nutrition screening tool and intervene as necessary  Determine patient's food preferences and provide high-protein, high-caloric foods as appropriate       INTERVENTIONS:  - Monitor oral intake, urinary output, labs, and treatment plans  - Assess nutrition and hydration status and recommend course of action  - Evaluate amount of meals eaten  - Assist patient with eating if necessary   - Allow adequate time for meals  - Recommend/ encourage appropriate diets, oral nutritional supplements, and vitamin/mineral supplements  - Order, calculate, and assess calorie counts as needed  - Assess need for intravenous fluids  - Provide specific nutrition/hydration education as appropriate  - Include patient/family/caregiver in decisions related to nutrition  Outcome: Progressing

## 2022-11-17 ENCOUNTER — NURSING HOME VISIT (OUTPATIENT)
Dept: GERIATRICS | Facility: OTHER | Age: 87
End: 2022-11-17

## 2022-11-17 DIAGNOSIS — Z43.4 CHOLECYSTOSTOMY CARE (HCC): ICD-10-CM

## 2022-11-17 DIAGNOSIS — D64.9 ANEMIA, UNSPECIFIED TYPE: ICD-10-CM

## 2022-11-17 DIAGNOSIS — M10.9 ACUTE GOUT OF ANKLE, UNSPECIFIED CAUSE, UNSPECIFIED LATERALITY: ICD-10-CM

## 2022-11-17 DIAGNOSIS — R53.81 PHYSICAL DECONDITIONING: ICD-10-CM

## 2022-11-17 DIAGNOSIS — E11.40 TYPE 2 DIABETES MELLITUS WITH DIABETIC NEUROPATHY, WITHOUT LONG-TERM CURRENT USE OF INSULIN (HCC): ICD-10-CM

## 2022-11-17 DIAGNOSIS — N18.30 STAGE 3 CHRONIC KIDNEY DISEASE, UNSPECIFIED WHETHER STAGE 3A OR 3B CKD (HCC): ICD-10-CM

## 2022-11-17 DIAGNOSIS — I48.0 PAROXYSMAL ATRIAL FIBRILLATION (HCC): ICD-10-CM

## 2022-11-17 DIAGNOSIS — K81.2 ACUTE ON CHRONIC CHOLECYSTITIS: Primary | ICD-10-CM

## 2022-11-17 DIAGNOSIS — Z79.01 CHRONIC ANTICOAGULATION: ICD-10-CM

## 2022-11-17 DIAGNOSIS — I50.32 CHRONIC DIASTOLIC CHF (CONGESTIVE HEART FAILURE) (HCC): ICD-10-CM

## 2022-11-17 NOTE — PROGRESS NOTES
[facility]   [address] [place] [address], [place], [address]  History and Physical  POS: SNF-31    Records Reviewed include: Nohemi Garciachristiano [facility] [[email or web address] 76  records    Chief Complaint/ Reason for Admission: Cholecystitis s/p cholecystostomy tube replacement; Afib on coumadin    History of Present Illness:            81year old male admitted for SNF rehab following hospitalization at [address] [place] [address]  Presented with abdominal pain; imaging revealed acute on chronic cholecystitis  Patient with recent hospitalization in October for acute cholecystitis with REMA and bacteremia; underwent percutaneous cholecystostomy tube at that time; tube found to be functioning but was replaced and upsize on 11/10/22  Treated with IV antibiotics x7 days with improvement in LFTs and symptoms  Patient without complaint of nausea, vomiting or abdominal pain at time of admission exam  Was previously on torsemide for chronic diastolic CHF and lower extremity edema; this was discontinued due to recent REMA; not currently on diuretic therapy; no shortness of breath, chest pressure reported  On chronic anticoagulation with coumadin in setting of pAfib; rhythm control with sotalol, HR trending 70s-80s  Was started on allopurinol 100mg daily inpatient with concerns for gout of bilateral ankles per record review         Allergies    Allergies   Allergen Reactions   • Tramadol Other (See Comments)     intolerance       Past Medical History  Past Medical History:   Diagnosis Date   • Anemia    • Arthritis    • Atrial fibrillation ([facility] )    • Basal cell carcinoma 03/22/2022    Tip of Nose   • CHF (congestive heart failure) (HCC)    • Diabetes mellitus ([facility] )     Niddm   • DVT (deep vein thrombosis) in pregnancy 1966    not in pregnancy   • DVT (deep venous thrombosis) ([facility] ) 1966   • Dyslipidemia    • Encephalopathy acute 11/4/2022   • GERD (gastroesophageal reflux disease)    • Hyperlipidemia    • Hypertension    • Hypomagnesemia 10/19/2022   • Irregular heart beat     Afib   • Morbid obesity due to excess calories ([facility] )     Resolved 9/2/2014    • Pulmonary embolism (HCC)    • Sepsis ([facility] )    • Squamous cell skin cancer 07/30/2020    Left posterior scalp   • Visual impairment         Past Surgical History:   Procedure Laterality Date   • AORTIC VALVE REPLACEMENT     • CARDIAC DEFIBRILLATOR PLACEMENT  04/2014   • CARDIAC SURGERY  02/2014    AVR   • COLONOSCOPY     • INSERT / REPLACE / REMOVE PACEMAKER     • IR CHOLECYSTOSTOMY TUBE CHECK/CHANGE/REPOSITION/REINSERTION/UPSIZE  10/13/2022   • IR CHOLECYSTOSTOMY TUBE CHECK/CHANGE/REPOSITION/REINSERTION/UPSIZE  10/19/2022   • IR CHOLECYSTOSTOMY TUBE CHECK/CHANGE/REPOSITION/REINSERTION/UPSIZE  10/27/2022   • IR CHOLECYSTOSTOMY TUBE CHECK/CHANGE/REPOSITION/REINSERTION/UPSIZE  11/7/2022   • IR CHOLECYSTOSTOMY TUBE CHECK/CHANGE/REPOSITION/REINSERTION/UPSIZE  11/10/2022   • IR CHOLECYSTOSTOMY TUBE PLACEMENT  9/1/2022   • JOINT REPLACEMENT Left     LTKR   • MOHS SURGERY  07/30/2020    Left posterior scalp, Dr Elton Javed   • [address]  04/18/2022    [facility] Tip of Nose- Dr Elton Javed   • LA LIGATE/STRIP LONG [address] SEP-FEM JUNC Right 8/17/2018    Procedure: LEG PERFORATED INJECTION SCLEROTHERAPY;  Surgeon: Frida Nina MD;  Location: [facility]  MAIN OR;  Service: Vascular   • REPLACEMENT TOTAL KNEE Right    • TOTAL KNEE ARTHROPLASTY Left    • VASCULAR SURGERY     • VENA CAVA FILTER PLACEMENT      Interruption inferior vena cava, Josue filter, placement   • WISDOM TOOTH EXTRACTION         Family History  Family History   Problem Relation Age of Onset   • Arthritis Mother    • Stroke Mother    • Arthritis Father    • Cancer Father    • Arthritis Daughter        Social History  Social History     Tobacco Use   Smoking Status Never   Smokeless Tobacco Never      Social History     Substance and Sexual Activity   Alcohol Use Not Currently    Comment: no alcohol in 28 yrs      Social History     Substance and Sexual Activity   Drug Use Never      Physical Exam    Weight: 291 6lb Temp:98 1F BP:106/72 Pulse:83 Resp:16 O2 Sat:98%RA    Physical Exam  Vitals and nursing note reviewed  Constitutional:       General: He is awake  He is not in acute distress  Appearance: He is well-developed and well-groomed  He is obese  He is not ill-appearing, toxic-appearing or diaphoretic  HENT:      Head: Normocephalic and atraumatic  Right Ear: External ear normal       Left Ear: External ear normal       Nose: No rhinorrhea  Mouth/Throat:      Mouth: Mucous membranes are moist    Eyes:      General: No scleral icterus  Right eye: No discharge  Left eye: No discharge  Conjunctiva/sclera: Conjunctivae normal    Pulmonary:      Effort: Pulmonary effort is normal  No respiratory distress  Abdominal:      General: There is no distension  Musculoskeletal:      Cervical back: No rigidity  Right lower leg: Edema present  Left lower leg: Edema present  Skin:     General: Skin is warm and dry  Coloration: Skin is not jaundiced or pale  Neurological:      Mental Status: He is alert  Cranial Nerves: No dysarthria or facial asymmetry  Psychiatric:         Attention and Perception: Attention and perception normal          Mood and Affect: Mood and affect normal          Speech: Speech normal          Behavior: Behavior normal  Behavior is cooperative  Thought Content: Thought content normal          Cognition and Memory: He does not exhibit impaired recent memory  Review of Systems:  Review of Systems   Constitutional: Negative for chills and fever  Respiratory: Negative for cough and shortness of breath  Cardiovascular: Positive for leg swelling  Gastrointestinal: Negative for abdominal pain, nausea and vomiting  Musculoskeletal: Positive for arthralgias  Neurological: Negative for dizziness and light-headedness  Psychiatric/Behavioral: Positive for sleep disturbance   Negative for confusion  All other systems reviewed and are negative        List of Current Medications: Medication list reviewed and updated in Epic to reflect most current SNF orders    Labs/Diagnostics (reviewed by this provider): Hospital Paperwork, Copy in Chart and Old Records  Lab Results   Component Value Date     10/10/2015    SODIUM 139 11/14/2022    K 3 9 11/14/2022     11/14/2022    CO2 23 11/14/2022    ANIONGAP 8 10/10/2015    AGAP 9 11/14/2022    BUN 17 11/14/2022    CREATININE 0 95 11/14/2022    GLUC 122 11/14/2022    GLUF 171 (H) 08/30/2022    CALCIUM 9 5 11/14/2022    AST 33 11/14/2022    ALT 13 11/14/2022    ALKPHOS 84 11/14/2022    PROT 7 5 10/10/2015    TP 6 7 11/14/2022    BILITOT 2 23 (H) 10/10/2015    TBILI 0 66 11/14/2022    EGFR 72 11/14/2022     Lab Results   Component Value Date    WBC 8 05 11/14/2022    HGB 9 1 (L) 11/14/2022    HCT 30 7 (L) 11/14/2022    MCV 91 11/14/2022     11/14/2022     Lab Results   Component Value Date    HGBA1C 6 3 (H) 03/01/2022     Lab Results   Component Value Date    INR 2 21 (H) 11/14/2022    INR 2 01 (H) 11/13/2022    INR 1 56 (H) 11/12/2022    PROTIME 24 8 (H) 11/14/2022    PROTIME 23 0 (H) 11/13/2022    PROTIME 18 9 (H) 11/12/2022     Imaging Reviewed:  CT Chest/Abdomen/Pelvis (11/4/22) cholelithiasis with persistent gallbladder inflammatory changes  IR cholecystostomy tube change (11/10/22)    Assessment/Plan:  81year old male with:    Acute on chronic cholecystitis  Completed 7 day course of IV antibiotics inpatient  Cholecystostomy tube exchanged on 11/10/22 with upsizing  Currently asymptomatic, monitor closely  Outpatient surgery follow up as scheduled  Follow up with IR as scheduled on 12/12/22    Cholecystostomy care [facility])  S/p IR cholecystostomy tube change on 11/10/22    Paroxysmal atrial fibrillation (HCC)  Continue sotalol  Continue chronic anticoagulation with coumadin- management as below    Chronic anticoagulation  Goal INR 2-3 in setting of pAfib  Continue coumadin 4mg daily, INR ordered for 11/21/22    Anemia  Continue oral iron supplementation  CBC ordered     Type 2 diabetes mellitus with diabetic neuropathy, without long-term current use of insulin ([facility])    Lab Results   Component Value Date    HGBA1C 6 3 (H) 03/01/2022   At goal A1c of <8  Continue metformin  Accuchecks ranging 118-173; d/c QID accuchecks, change to fasting accuchecks once daily with plan to d/c accuchecks completely next week if glucose remains stable/well controlled  Continue gabapentin for neuropathic pain  Recommend checking Vitamin B12 level outpatient    Chronic diastolic CHF (congestive heart failure) ([facility] [address] )  Monitor daily weights/CHF pathway  Previously on torsemide 60mg total daily for chronic LE edema; currently not on diuretics in setting of recent REMA  BMP ordered    CKD (chronic kidney disease) stage 3, GFR 30-59 ml/min ([facility])  Baseline creatinine 1 3-1 6  With recent REMA in setting of cholecystitis  Monitor volume status, renal function closely  BMP ordered for 11/21    Physical deconditioning  Multifactorial  Admit to SNF for rehab  PT/OT consults placed- evaluate and treat  Supportive care, nutritional support, ADL support  Fall precautions  Management of acute and chronic medical conditions as outlined    Gout  Started on allopurinol inpatient with concerns for gout as etiology of bilateral ankle pain and swelling  Recommend outpatient workup including uric acid level  Monitor renal function closely  Ensure adequate hydration    Pain: No  Rehab Potential:Good  Patient Informed of Medical Condition: Yes  Patient is Capable of Understanding Their Right: Yes  Prognosis:Fair  Discharge Plan:   Surrogate Decision Maker:  Advanced Directives:  Code status:Full Code  PCP: Abimael January, DO    Immunization History   Administered Date(s) Administered   • COVID-19 PFIZER VACCINE 0 3 ML IM 09/28/2021   • COVID-19 Pfizer vac (Ancelmo-sucrose, gray cap) 12 yr+ IM 04/07/2022   • INFLUENZA 10/21/2014, 01/17/2016, 12/13/2016, 12/31/2017, 12/11/2018, 09/28/2021, 11/05/2022   • Influenza Split High Dose Preservative Free IM 10/25/2019   • Influenza, Seasonal Vaccine, Quadrivalent, Adjuvanted,  5e 11/30/2020   • Influenza, high dose seasonal 0 7 mL 11/05/2022   • Pneumococcal Conjugate 13-Valent 06/04/2019   • Tdap 06/04/2019     Preston Alba DO  11/17/22

## 2022-11-21 ENCOUNTER — NURSING HOME VISIT (OUTPATIENT)
Dept: GERIATRICS | Facility: OTHER | Age: 87
End: 2022-11-21

## 2022-11-21 DIAGNOSIS — K81.2 ACUTE ON CHRONIC CHOLECYSTITIS: Primary | ICD-10-CM

## 2022-11-21 DIAGNOSIS — D64.9 ANEMIA, UNSPECIFIED TYPE: ICD-10-CM

## 2022-11-21 DIAGNOSIS — I48.0 PAROXYSMAL ATRIAL FIBRILLATION (HCC): ICD-10-CM

## 2022-11-21 DIAGNOSIS — R53.81 PHYSICAL DECONDITIONING: ICD-10-CM

## 2022-11-21 DIAGNOSIS — I50.32 CHRONIC DIASTOLIC CHF (CONGESTIVE HEART FAILURE) (HCC): ICD-10-CM

## 2022-11-21 DIAGNOSIS — E11.40 TYPE 2 DIABETES MELLITUS WITH DIABETIC NEUROPATHY, WITHOUT LONG-TERM CURRENT USE OF INSULIN (HCC): ICD-10-CM

## 2022-11-21 DIAGNOSIS — G47.01 INSOMNIA DUE TO MEDICAL CONDITION: ICD-10-CM

## 2022-11-21 DIAGNOSIS — Z43.4 CHOLECYSTOSTOMY CARE (HCC): ICD-10-CM

## 2022-11-21 DIAGNOSIS — Z79.01 CHRONIC ANTICOAGULATION: ICD-10-CM

## 2022-11-21 PROBLEM — N39.0 URINARY TRACT INFECTION, ACUTE: Status: RESOLVED | Noted: 2017-04-04 | Resolved: 2022-11-21

## 2022-11-21 PROBLEM — R10.9 ABDOMINAL CRAMPING: Status: RESOLVED | Noted: 2022-11-12 | Resolved: 2022-11-21

## 2022-11-21 PROBLEM — Z51.81 SUBTHERAPEUTIC ANTICOAGULATION: Status: RESOLVED | Noted: 2022-11-12 | Resolved: 2022-11-21

## 2022-11-21 PROBLEM — M10.9 GOUT: Status: ACTIVE | Noted: 2022-11-21

## 2022-11-21 PROBLEM — N18.30 CKD (CHRONIC KIDNEY DISEASE) STAGE 3, GFR 30-59 ML/MIN (HCC): Status: ACTIVE | Noted: 2022-05-07

## 2022-11-21 PROBLEM — R31.0 HEMATURIA, GROSS: Status: RESOLVED | Noted: 2017-05-19 | Resolved: 2022-11-21

## 2022-11-21 PROBLEM — R78.81 BACTEREMIA: Status: RESOLVED | Noted: 2022-10-23 | Resolved: 2022-11-21

## 2022-11-21 NOTE — ASSESSMENT & PLAN NOTE
Multifactorial  Admit to SNF for rehab  PT/OT consults placed- evaluate and treat  Supportive care, nutritional support, ADL support  Fall precautions  Management of acute and chronic medical conditions as outlined

## 2022-11-21 NOTE — ASSESSMENT & PLAN NOTE
Multifactorial  Continue supportive care at SNF for ADLs  Continue PT/OT  Continue fall precautions  Ensure adequate hydration and nutrition

## 2022-11-21 NOTE — ASSESSMENT & PLAN NOTE
Completed IV antibiotic course inpatient  With cholecystostomy tube right abdomen to gravity drainage minimal drainage  No complaints of abdominal pain, nausea, or vomiting  Afebrile and hemodynamically stable  Follow-up with surgery and IR outpatient

## 2022-11-21 NOTE — PROGRESS NOTES
Facility: [facility]  [address] [place] [address], [facility] [address]  POS: 31 (STR)  Progress Note    Chief Complaint/Reason for visit: STR follow up visit  Code status:  Full code  History obtained from patient, nursing staff, and EMR  History of Present Illness:  62-year-old male seen and examined for STR follow up of acute and chronic medical conditions  Received patient seated in chair and appears in no distress  Patient's nurse was present in the room performing dressing change to right abdominal jeny tube insertion site  Minimal amount of drainage noted in jeny tube  No complaints of pain, nausea, or vomiting  Afebrile and hemodynamically stable  Patient's weight is fluctuating between 290-292 lb and currently not on any diuretics due to recent REMA  Recent creatinine below baseline  He has significant bilateral lower extremity edema  Denies having shortness of breath or chest pain  Past Medical History: unchanged from history and physical  Past Medical History:   Diagnosis Date   • Anemia    • Arthritis    • Atrial fibrillation ([address] )    • Basal cell carcinoma 03/22/2022    Tip of Nose   • CHF (congestive heart failure) ([facility])    • Diabetes mellitus ([address] )     Niddm   • DVT (deep vein thrombosis) in pregnancy 1966    not in pregnancy   • DVT (deep venous thrombosis) ([address] ) 1966   • Dyslipidemia    • Encephalopathy acute 11/4/2022   • GERD (gastroesophageal reflux disease)    • Hyperlipidemia    • Hypertension    • Hypomagnesemia 10/19/2022   • Irregular heart beat     Afib   • Morbid obesity due to excess calories ([address] )     Resolved 9/2/2014    • Pulmonary embolism (HCC)    • Sepsis ([address] )    • Squamous cell skin cancer 07/30/2020    Left posterior scalp   • Visual impairment      Family History: unchanged from history and physical  Social History: unchanged from history and physical  Review of systems: Review of Systems   Constitutional: Negative for appetite change, chills and diaphoresis     Eyes: Positive for visual disturbance  Respiratory: Negative for cough and shortness of breath  Cardiovascular: Positive for leg swelling  Negative for chest pain and palpitations  Gastrointestinal: Negative for abdominal pain, constipation, diarrhea, nausea and vomiting  Endocrine: Negative  Genitourinary: Negative for difficulty urinating and dysuria  Musculoskeletal: Positive for gait problem  Neurological: Negative for dizziness, syncope, speech difficulty, light-headedness, numbness and headaches  Psychiatric/Behavioral: Positive for sleep disturbance  Negative for behavioral problems, confusion, dysphoric mood and hallucinations  All other systems reviewed and are negative  Medications: All medication and routine orders were reviewed and updated  Allergies: Reviewed and unchanged  Consults reviewed:PT, OT and Other  Labs/Diagnostics (reviewed by this provider): Copy in Chart    Imaging Reviewed:  None today    Physical Exam    Weight:  Temp:   98        BP:  120/75  Pulse:  80 Resp: 18      O2 Sat:  97% on room air  Constitutional: Normocephalic  Orientation:Person, Place, Day, Date, Month and Year     Physical Exam  Vitals and nursing note reviewed  Constitutional:       General: He is not in acute distress  Appearance: He is obese  He is not toxic-appearing or diaphoretic  Comments: Elderly male who appears chronically ill  In no distress  HENT:      Head: Normocephalic  Nose: No congestion or rhinorrhea  Mouth/Throat:      Mouth: Mucous membranes are moist       Pharynx: No oropharyngeal exudate  Eyes:      General: No scleral icterus  Right eye: No discharge  Left eye: No discharge  Extraocular Movements: Extraocular movements intact  Conjunctiva/sclera: Conjunctivae normal       Pupils: Pupils are equal, round, and reactive to light  Comments: Wears glasses  Cardiovascular:      Rate and Rhythm: Normal rate        Pulses: Normal pulses  Pulmonary:      Effort: Pulmonary effort is normal  No respiratory distress  Breath sounds: Normal breath sounds  No wheezing, rhonchi or rales  Abdominal:      General: Bowel sounds are normal  There is no distension  Palpations: Abdomen is soft  Tenderness: There is no abdominal tenderness  There is no guarding  Comments: Large abdomen  Right abdominal jney tube intact with minimal amount of drainage  Musculoskeletal:      Right lower leg: Edema (Significant edema) present  Left lower leg: Edema (Significant edema) present  Comments: Moves all 4 extremities  Skin:     General: Skin is warm  Capillary Refill: Capillary refill takes less than 2 seconds  Neurological:      Mental Status: He is alert and oriented to person, place, and time  Motor: Weakness present  Gait: Gait abnormal    Psychiatric:         Mood and Affect: Mood normal          Behavior: Behavior normal          Thought Content: Thought content normal        Assessment/Plan:  52-year-old male with:    Acute on chronic cholecystitis  Completed IV antibiotic course inpatient  With cholecystostomy tube right abdomen to gravity drainage minimal drainage  No complaints of abdominal pain, nausea, or vomiting  Afebrile and hemodynamically stable  Follow-up with surgery and IR outpatient    Cholecystostomy care [facility])  S/p cholecystostomy tube exchange in IR 11/10/2022  No signs of infection at insertion site    Chronic diastolic CHF (congestive heart failure) ([address] )  Weights fluctuating between 290-292 lbs  Weight 292 2 lb today with Significant edema noted to bilateral lower extremities  Currently not on maintenance diuretics due to recent REMA    He was previously on torsemide 60 mg daily  BMP stable x2 with most recent creatinine 0 98/GFR 75 on 11/17/2022  Will order torsemide 20mg today x1 dose  Continue CHF pathway/daily weights    Paroxysmal atrial fibrillation (HCC)  Heart rate stable  Continue sotalol 80 mg daily  Continue Coumadin management during SNF stay    Chronic anticoagulation  Goal INR 2-3 in setting of atrial fibrillation  Currently on Coumadin 4 mg daily  Check INR on 11/21/2022    Type 2 diabetes mellitus with diabetic neuropathy, without long-term current use of insulin (HCC)    Lab Results   Component Value Date    HGBA1C 6 3 (H) 03/01/2022   Blood glucose 126 this morning  Will plan to discontinue a m  Accu-Cheks with next visit if glucose remains stable  Patient is currently on metformin 1000 mg daily and gabapentin 100 mg daily for neuropathic pain    Anemia  Most recent hemoglobin 9 5/hematocrit 28 5 on 11/15/2022 and previously 8 5 inpatient  Continue Niferex daily  Continue vitamin B complex and multivitamin  Recommend checking vitamin B12 outpatient    Physical deconditioning  Multifactorial  Continue supportive care at SNF for ADLs  Continue PT/OT  Continue fall precautions  Ensure adequate hydration and nutrition    Insomnia due to medical condition  Continue melatonin 5 mg q h s  This note was completed in part utilizing m-modal fluency direct voice recognition software  Grammatical errors, random word insertion, spelling mistakes, and incomplete sentences may be an occasional consequence of the system secondary to software limitations, ambient noise and hardware issues  At the time of dictation, efforts were made to edit, clarify and/or correct errors  Please read the chart carefully and recognize, using context, where substitutions have occurred  If you have any questions or concerns about the context, text or information contained within the body of this dictation, please contact myself, the provider, for further clarification      Romelia Gore 79, [address]  11/21/202211:39 AM

## 2022-11-21 NOTE — ASSESSMENT & PLAN NOTE
Completed 7 day course of IV antibiotics inpatient  Cholecystostomy tube exchanged on 11/10/22 with upsizing  Currently asymptomatic, monitor closely  Outpatient surgery follow up as scheduled  Follow up with IR as scheduled on 12/12/22

## 2022-11-21 NOTE — ASSESSMENT & PLAN NOTE
Weights fluctuating between 290-292 lbs  Weight 292 2 lb today with Significant edema noted to bilateral lower extremities  Currently not on maintenance diuretics due to recent REMA    He was previously on torsemide 60 mg daily  BMP stable x2 with most recent creatinine 0 98/GFR 75 on 11/17/2022  Will order torsemide 20mg today x1 dose  Continue CHF pathway/daily weights

## 2022-11-21 NOTE — ASSESSMENT & PLAN NOTE
Started on allopurinol inpatient with concerns for gout as etiology of bilateral ankle pain and swelling  Recommend outpatient workup including uric acid level  Monitor renal function closely  Ensure adequate hydration Detail Level: Detailed Plan: Pt given biopsy aftercare handout

## 2022-11-21 NOTE — ASSESSMENT & PLAN NOTE
Lab Results   Component Value Date    HGBA1C 6 3 (H) 03/01/2022   At goal A1c of <8  Continue metformin  Accuchecks ranging 118-173; d/c QID accuchecks, change to fasting accuchecks once daily with plan to d/c accuchecks completely next week if glucose remains stable/well controlled  Continue gabapentin for neuropathic pain  Recommend checking Vitamin B12 level outpatient

## 2022-11-21 NOTE — ASSESSMENT & PLAN NOTE
Most recent hemoglobin 9 5/hematocrit 28 5 on 11/15/2022 and previously 8 5 inpatient  Continue Niferex daily  Continue vitamin B complex and multivitamin  Recommend checking vitamin B12 outpatient

## 2022-11-21 NOTE — ASSESSMENT & PLAN NOTE
Goal INR 2-3 in setting of atrial fibrillation  Currently on Coumadin 4 mg daily  Check INR on 11/21/2022

## 2022-11-21 NOTE — ASSESSMENT & PLAN NOTE
Lab Results   Component Value Date    HGBA1C 6 3 (H) 03/01/2022   Blood glucose 126 this morning  Will plan to discontinue a m   Accu-Cheks with next visit if glucose remains stable  Patient is currently on metformin 1000 mg daily and gabapentin 100 mg daily for neuropathic pain

## 2022-11-21 NOTE — ASSESSMENT & PLAN NOTE
Baseline creatinine 1 3-1 6  With recent REMA in setting of cholecystitis  Monitor volume status, renal function closely  BMP ordered for 11/21

## 2022-11-21 NOTE — ASSESSMENT & PLAN NOTE
Monitor daily weights/CHF pathway  Previously on torsemide 60mg total daily for chronic LE edema; currently not on diuretics in setting of recent REMA  BMP ordered

## 2022-11-23 ENCOUNTER — NURSING HOME VISIT (OUTPATIENT)
Dept: GERIATRICS | Facility: OTHER | Age: 87
End: 2022-11-23

## 2022-11-23 DIAGNOSIS — I50.32 CHRONIC DIASTOLIC CHF (CONGESTIVE HEART FAILURE) (HCC): ICD-10-CM

## 2022-11-23 DIAGNOSIS — R53.81 PHYSICAL DECONDITIONING: ICD-10-CM

## 2022-11-23 DIAGNOSIS — I48.0 PAROXYSMAL ATRIAL FIBRILLATION (HCC): ICD-10-CM

## 2022-11-23 DIAGNOSIS — K81.2 ACUTE ON CHRONIC CHOLECYSTITIS: Primary | ICD-10-CM

## 2022-11-23 DIAGNOSIS — E11.40 TYPE 2 DIABETES MELLITUS WITH DIABETIC NEUROPATHY, WITHOUT LONG-TERM CURRENT USE OF INSULIN (HCC): ICD-10-CM

## 2022-11-23 DIAGNOSIS — Z79.01 CHRONIC ANTICOAGULATION: ICD-10-CM

## 2022-11-23 RX ORDER — TORSEMIDE 20 MG/1
20 TABLET ORAL DAILY
COMMUNITY

## 2022-11-23 NOTE — ASSESSMENT & PLAN NOTE
Goal INR 2-3 in setting of atrial fibrillation  Most recent INR 3 0 and Coumadin dose was decreased to 3 5 milligram daily  Recheck INR on 11/28/2022

## 2022-11-23 NOTE — ASSESSMENT & PLAN NOTE
Lab Results   Component Value Date    HGBA1C 6 3 (H) 03/01/2022   Blood glucose 133 this morning  Will discontinue Accu-Cheks since blood glucose has been stable  Continue metformin 1000 milligram daily and gabapentin 100 milligram daily for neuropathic pain

## 2022-11-23 NOTE — ASSESSMENT & PLAN NOTE
Completed IV antibiotic course inpatient  Cholecystostomy tube to gravity drainage  Patient had some moderate drainage at insertion site today    No signs of infection  No abdominal pain, nausea, or vomiting  Afebrile and hemodynamically stable  Followup with surgery and IR outpatient

## 2022-11-23 NOTE — ASSESSMENT & PLAN NOTE
Wt Readings from Last 3 Encounters:   11/14/22 130 kg (286 lb)   10/25/22 (!) 137 kg (302 lb)   10/24/22 (!) 138 kg (305 lb 3 2 oz)   Previously on torsemide 60 milligrams which was discontinued due to recent REMA  Patient has pitting edema bilateral lower extremities and feels the edema has worsened    Will reach order torsemide 20 milligrams daily which was discussed with patient  Nursing to report a weight gain of >= 2 pounds in 2 days  Most recent creatinine stable 0 98  Recheck BMP on 11/28/2022  Continue CHF pathway/daily weights

## 2022-11-23 NOTE — PROGRESS NOTES
Facility: [facility]  [address] [place] [address], [address]  POS: 31 (STR)  Progress Note    Chief Complaint/Reason for visit: STR follow up visit  Code status: Full code  History obtained from patient, nursing staff, and EMR  History of Present Illness:  60-year-old male seen and examined for STR follow up of acute and chronic medical conditions  Received patient seated in chair and appears in no distress  Gail tube right abdomen intact and dressing saturated brown colored drainage  Gail tube flushed with 10 mL of NS with nurse present and new dressing applied  No signs of infection noted at insertion site  Patient denies having nausea or vomiting  He is participating in physical therapy and ambulating up to 72 feet with cs  He is for tentative discharge home on 11/29/2022 in which he is okay with  Past Medical History: unchanged from history and physical  Past Medical History:   Diagnosis Date   • Anemia    • Arthritis    • Atrial fibrillation ([address] )    • Basal cell carcinoma 03/22/2022    Tip of Nose   • CHF (congestive heart failure) ([facility])    • Diabetes mellitus ([address] )     Niddm   • DVT (deep vein thrombosis) in pregnancy 1966    not in pregnancy   • DVT (deep venous thrombosis) ([address] ) 1966   • Dyslipidemia    • Encephalopathy acute 11/4/2022   • GERD (gastroesophageal reflux disease)    • Hyperlipidemia    • Hypertension    • Hypomagnesemia 10/19/2022   • Irregular heart beat     Afib   • Morbid obesity due to excess calories ([address] )     Resolved 9/2/2014    • Pulmonary embolism (HCC)    • Sepsis ([address] )    • Squamous cell skin cancer 07/30/2020    Left posterior scalp   • Visual impairment      Family History: unchanged from history and physical  Social History: unchanged from history and physical  Review of systems: Review of Systems   Constitutional: Negative for chills and diaphoresis  Respiratory: Negative for cough and shortness of breath  Cardiovascular: Negative for chest pain  Gastrointestinal: Negative for abdominal pain, constipation, diarrhea, nausea and vomiting  Genitourinary: Negative for flank pain  Musculoskeletal: Positive for gait problem  Neurological: Negative for dizziness, syncope, speech difficulty, light-headedness, numbness and headaches  Psychiatric/Behavioral: Negative for confusion and hallucinations  The patient is not nervous/anxious  All other systems reviewed and are negative  Medications: All medication and routine orders were reviewed and updated  Allergies: Reviewed and unchanged  Consults reviewed: Other  Labs/Diagnostics (reviewed by this provider): Copy in Chart    Imaging Reviewed:  None today    Physical Exam    Weight:  291 8 lb Temp:  98         BP: 131/78  Pulse:  77 Resp:  18     O2 Sat:  Constitutional: Obese  Orientation:Person, Place, Day, Date, Month and Year     Physical Exam  Vitals and nursing note reviewed  Constitutional:       General: He is not in acute distress  Appearance: He is obese  He is not toxic-appearing or diaphoretic  Comments: Elderly male who appears with chronic illness  HENT:      Head: Normocephalic  Nose: No congestion or rhinorrhea  Mouth/Throat:      Mouth: Mucous membranes are moist       Pharynx: No oropharyngeal exudate  Eyes:      General: No scleral icterus  Right eye: No discharge  Left eye: No discharge  Extraocular Movements: Extraocular movements intact  Conjunctiva/sclera: Conjunctivae normal       Pupils: Pupils are equal, round, and reactive to light  Cardiovascular:      Rate and Rhythm: Normal rate  Pulses: Normal pulses  Pulmonary:      Effort: Pulmonary effort is normal  No respiratory distress  Breath sounds: Normal breath sounds  No wheezing, rhonchi or rales  Abdominal:      General: Bowel sounds are normal  There is no distension  Tenderness: There is no abdominal tenderness  There is no guarding        Comments: Abdominal jeny tube intact  Jeny tube insertion site dressing was saturated with brown color drainage  Musculoskeletal:      Right lower leg: Edema (+3 pitting edema ) present  Left lower leg: Edema (+3 pititng edema ) present  Comments: Moves all 4 extremities  Skin:     General: Skin is warm and dry  Capillary Refill: Capillary refill takes less than 2 seconds  Comments: Vascular changes noted to bilateral lower extremities  Neurological:      Mental Status: He is alert and oriented to person, place, and time  Motor: Weakness present  Gait: Gait abnormal    Psychiatric:         Mood and Affect: Mood normal          Behavior: Behavior normal          Thought Content: Thought content normal        Assessment/Plan:  81-year-old male with:    Acute on chronic cholecystitis  Completed IV antibiotic course inpatient  Cholecystostomy tube to gravity drainage  Patient had some moderate drainage at insertion site today  No signs of infection  No abdominal pain, nausea, or vomiting  Afebrile and hemodynamically stable  Followup with surgery and IR outpatient    Chronic diastolic CHF (congestive heart failure) ([facility])  Wt Readings from Last 3 Encounters:   11/14/22 130 kg (286 lb)   10/25/22 (!) 137 kg (302 lb)   10/24/22 (!) 138 kg (305 lb 3 2 oz)   Previously on torsemide 60 milligrams which was discontinued due to recent REMA  Patient has pitting edema bilateral lower extremities and feels the edema has worsened    Will reach order torsemide 20 milligrams daily which was discussed with patient  Nursing to report a weight gain of >= 2 pounds in 2 days  Most recent creatinine stable 0 98  Recheck BMP on 11/28/2022  Continue CHF pathway/daily weights    Type 2 diabetes mellitus with diabetic neuropathy, without long-term current use of insulin ([facility])    Lab Results   Component Value Date    HGBA1C 6 3 (H) 03/01/2022   Blood glucose 133 this morning  Will discontinue Accu-Cheks since blood glucose has been stable  Continue metformin 1000 milligram daily and gabapentin 100 milligram daily for neuropathic pain    Paroxysmal atrial fibrillation (HCC)  Heart rate stable  Continue sotalol 80 milligrams daily  Continue Coumadin management    Chronic anticoagulation  Goal INR 2-3 in setting of atrial fibrillation  Most recent INR 3 0 and Coumadin dose was decreased to 3 5 milligram daily  Recheck INR on 11/28/2022    Physical deconditioning  Multifactorial  Continue supportive care at SNF for ADLs  Continue PT/OT  Continue fall precautions  Provide nutritional support  Management of acute and chronic medical conditions    This note was completed in part utilizing [employer] direct voice recognition software  Grammatical errors, random word insertion, spelling mistakes, and incomplete sentences may be an occasional consequence of the system secondary to software limitations, ambient noise and hardware issues  At the time of dictation, efforts were made to edit, clarify and/or correct errors  Please read the chart carefully and recognize, using context, where substitutions have occurred  If you have any questions or concerns about the context, text or information contained within the body of this dictation, please contact myself, the provider, for further clarification      Alter Bao [address], [address] [facility]  60/64/54290:47 PM

## 2022-11-25 ENCOUNTER — HOME HEALTH ADMISSION (OUTPATIENT)
Dept: HOME HEALTH SERVICES | Facility: HOME HEALTHCARE | Age: 87
End: 2022-11-25

## 2022-11-25 ENCOUNTER — TRANSCRIBE ORDERS (OUTPATIENT)
Dept: HOME HEALTH SERVICES | Facility: HOME HEALTHCARE | Age: 87
End: 2022-11-25

## 2022-11-25 DIAGNOSIS — I50.32 CHRONIC DIASTOLIC CHF (CONGESTIVE HEART FAILURE) (HCC): ICD-10-CM

## 2022-11-25 DIAGNOSIS — I48.0 PAROXYSMAL ATRIAL FIBRILLATION (HCC): ICD-10-CM

## 2022-11-25 DIAGNOSIS — D64.9 ANEMIA, UNSPECIFIED TYPE: ICD-10-CM

## 2022-11-25 DIAGNOSIS — N18.30 STAGE 3 CHRONIC KIDNEY DISEASE, UNSPECIFIED WHETHER STAGE 3A OR 3B CKD (HCC): ICD-10-CM

## 2022-11-25 DIAGNOSIS — R53.81 PHYSICAL DECONDITIONING: ICD-10-CM

## 2022-11-25 DIAGNOSIS — M10.9 ACUTE GOUT OF ANKLE, UNSPECIFIED CAUSE, UNSPECIFIED LATERALITY: ICD-10-CM

## 2022-11-25 DIAGNOSIS — Z43.4 CHOLECYSTOSTOMY CARE (HCC): ICD-10-CM

## 2022-11-25 DIAGNOSIS — K81.2 ACUTE ON CHRONIC CHOLECYSTITIS: Primary | ICD-10-CM

## 2022-11-25 DIAGNOSIS — E11.40 TYPE 2 DIABETES MELLITUS WITH DIABETIC NEUROPATHY, WITHOUT LONG-TERM CURRENT USE OF INSULIN (HCC): ICD-10-CM

## 2022-11-25 DIAGNOSIS — Z79.01 CHRONIC ANTICOAGULATION: ICD-10-CM

## 2022-11-28 ENCOUNTER — NURSING HOME VISIT (OUTPATIENT)
Dept: GERIATRICS | Facility: OTHER | Age: 87
End: 2022-11-28

## 2022-11-28 DIAGNOSIS — D64.9 ANEMIA, UNSPECIFIED TYPE: ICD-10-CM

## 2022-11-28 DIAGNOSIS — Z79.01 CHRONIC ANTICOAGULATION: ICD-10-CM

## 2022-11-28 DIAGNOSIS — N18.30 STAGE 3 CHRONIC KIDNEY DISEASE, UNSPECIFIED WHETHER STAGE 3A OR 3B CKD (HCC): ICD-10-CM

## 2022-11-28 DIAGNOSIS — R53.81 PHYSICAL DECONDITIONING: ICD-10-CM

## 2022-11-28 DIAGNOSIS — K81.2 ACUTE ON CHRONIC CHOLECYSTITIS: Primary | ICD-10-CM

## 2022-11-28 DIAGNOSIS — E11.40 TYPE 2 DIABETES MELLITUS WITH DIABETIC NEUROPATHY, WITHOUT LONG-TERM CURRENT USE OF INSULIN (HCC): ICD-10-CM

## 2022-11-28 DIAGNOSIS — I48.0 PAROXYSMAL ATRIAL FIBRILLATION (HCC): ICD-10-CM

## 2022-11-28 DIAGNOSIS — I50.32 CHRONIC DIASTOLIC CHF (CONGESTIVE HEART FAILURE) (HCC): ICD-10-CM

## 2022-11-28 DIAGNOSIS — Z43.4 CHOLECYSTOSTOMY CARE (HCC): ICD-10-CM

## 2022-11-28 NOTE — ASSESSMENT & PLAN NOTE
Wt Readings from Last 3 Encounters:   11/14/22 130 kg (286 lb)   10/25/22 (!) 137 kg (302 lb)   10/24/22 (!) 138 kg (305 lb 3 2 oz)   Previously on torsemide 60 mg which was discontinued inpatient due to REMA  Restarted patient on torsemide 20 mg daily due to severe pitting edema of bilateral lower extremities    Most recent creatinine 0 98  Awaiting BMP results from today  Patient denies having shortness of breath or chest pain  Follow-up with cardiology tomorrow 11/29/2022

## 2022-11-28 NOTE — ASSESSMENT & PLAN NOTE
Recent WBC 6 0  Completed IV antibiotic course inpatient  Cholecystostomy tube to gravity drainage  Patient had some moderate drainage at insertion site last week and none noted today  Dressing is dry and intact    No abdominal pain, nausea, or vomiting  Follow-up with surgery in IR outpatient

## 2022-11-28 NOTE — ASSESSMENT & PLAN NOTE
Status post cholecystostomy tube exchange in IR 11/10/2022  No infection noted at insertion site  Patient's wife was educated by nursing staff on how to care for cholecystostomy tube  VNA also ordered

## 2022-11-28 NOTE — ASSESSMENT & PLAN NOTE
Heart rate stable on sotalol 80 mg daily  Patient is on Coumadin for anticoagulation  Follow-up with cardiology as scheduled tomorrow

## 2022-11-28 NOTE — ASSESSMENT & PLAN NOTE
Lab Results   Component Value Date    EGFR 72 11/14/2022    EGFR 68 11/13/2022    EGFR 71 11/12/2022    CREATININE 0 95 11/14/2022    CREATININE 0 99 11/13/2022    CREATININE 0 96 11/12/2022   Baseline creatinine 1 3  Recent REMA in setting of cholecystitis inpatient  Most recent creatinine 0 93/GFR 80 on 11/21/2022  Awaiting BMP results from today

## 2022-11-28 NOTE — ASSESSMENT & PLAN NOTE
Goal INR 2-3 in setting of atrial fibrillation  Most recent INR 3 0 and Coumadin dose was decreased from 4 mg daily to 3 5 mg daily    Awaiting PT/INR results from today  No signs of active bleeding noted  Follow-up with PCP/Cardiology for management

## 2022-11-28 NOTE — ASSESSMENT & PLAN NOTE
Multifactorial  Patient participated in PT/OT during his SNF stay  Patient ambulating up to 120 ft with FWW and AB, W/C to follow  As per physical therapy notes, patient ambulated with slow gait, increased trunk flexion, notable hip dropped during stance phase limited by fatigue    Recommended patient to continue PT/OT with home health services

## 2022-11-28 NOTE — ASSESSMENT & PLAN NOTE
Hemoglobin trend with most recent 8 6<< 9 5<< 8 5 inpatient    Awaiting CBC results from today  Continue Niferex daily  Continue vitamin B complex and multivitamin  Recommend checking vitamin B12 outpatient

## 2022-11-28 NOTE — ASSESSMENT & PLAN NOTE
Lab Results   Component Value Date    HGBA1C 6 3 (H) 03/01/2022   Blood glucose was stable during SNF stay so Accu-Cheks were discontinued  Continue metformin 1000 mg daily   Continue gabapentin 100 mg daily for neuropathic pain  Follow-up with PCP for management

## 2022-11-28 NOTE — PROGRESS NOTES
[facility]  [address]  [phone]  DISCHARGE SUMMARY  POS: 31 (XQU)  Facility: [facility]    NAME: Yaya Constantino  AGE: 81 y o  SEX: male  DATE OF ADMISSION:  11/14/2022 DATE OF DISCHARGE:  11/28/2022 DISCHARGE DISPOSITION:  Home    Reason for admission: Patient was admitted from [facility] for rehabilitation after hospitalization for acute on chronic cholecystitis  Admission Diagnoses:  Cholecystitis status post cholecystostomy tube replacement  Additional Problems:   Past Medical History:   Diagnosis Date   • Anemia    • Arthritis    • Atrial fibrillation ([facility] )    • Basal cell carcinoma 03/22/2022    Tip of Nose   • CHF (congestive heart failure) ([facility])    • Diabetes mellitus (HCC)     Niddm   • DVT (deep vein thrombosis) in pregnancy 1966    not in pregnancy   • DVT (deep venous thrombosis) ([facility] ) 1966   • Dyslipidemia    • Encephalopathy acute 11/4/2022   • GERD (gastroesophageal reflux disease)    • Hyperlipidemia    • Hypertension    • Hypomagnesemia 10/19/2022   • Irregular heart beat     Afib   • Morbid obesity due to excess calories ([facility] )     Resolved 9/2/2014    • Pulmonary embolism (HCC)    • Sepsis ([facility] )    • Squamous cell skin cancer 07/30/2020    Left posterior scalp   • Visual impairment       Discharge Diagnoses: See problem list follow up recommendations below  Course of stay: Patient was admitted to [facility] for rehabilitation following hospitalization for above mentioned  At time of examination, patient is seated in chair, and appears in no distress  Denies having pain or discomfort  Denies shortness of breath, chest pain, headache, dizziness, nausea, or vomiting  During the resident's stay at [facility], he received skilled nursing care, PT, OT, dietitian support, social service support, and medical management for an overall improvement in his functional status  He is scheduled to be discharged home today    A script was provided to repeat CBC and BMP in 1 week with results to PCP  A referral was placed to [facility] by social service for home health services  Labs and testing performed during stay:  PT/INR, CBC, BMP    Discharge Medications: See discharge medication list which was reviewed in epic and compared to facility orders for accuracy  Status at time of discharge exam: Stable    Today's Visit: 11/28/202210:49 AM    Subjective:  No complaints  Review of systems:  As per review of present illness, all other systems reviewed and negative  Vitals:  Weight:  290 4 lb   BP: 122/75    temp: 98 2°   heart rate: 80    resp: 16    Exam: Physical Exam  Vitals and nursing note reviewed  Constitutional:       General: He is not in acute distress  Appearance: He is not toxic-appearing or diaphoretic  HENT:      Head: Normocephalic  Nose: No congestion or rhinorrhea  Mouth/Throat:      Mouth: Mucous membranes are moist       Pharynx: No oropharyngeal exudate  Eyes:      General: No scleral icterus  Right eye: No discharge  Left eye: No discharge  Extraocular Movements: Extraocular movements intact  Conjunctiva/sclera: Conjunctivae normal       Pupils: Pupils are equal, round, and reactive to light  Comments: Wears glasses  Cardiovascular:      Rate and Rhythm: Normal rate  Rhythm irregular  Pulses: Normal pulses  Pulmonary:      Effort: Pulmonary effort is normal  No respiratory distress  Breath sounds: Normal breath sounds  No wheezing, rhonchi or rales  Abdominal:      General: Bowel sounds are normal  There is no distension  Palpations: Abdomen is soft  Tenderness: There is no abdominal tenderness  There is no guarding  Comments: Right abdominal jeny tube intact  No leaking noted at site  Musculoskeletal:      Cervical back: Neck supple  No rigidity  Right lower leg: Edema (Chronic pitting edema lower extremity) present  Left lower leg: Edema (Chronic pitting edema lower extremity) present  Comments: Moves all 4 extremities  Lymphadenopathy:      Cervical: No cervical adenopathy  Skin:     General: Skin is warm and dry  Capillary Refill: Capillary refill takes less than 2 seconds  Comments: Vascular changes to bilateral lower extremities  Neurological:      Mental Status: He is alert and oriented to person, place, and time  Motor: Weakness present  Gait: Gait abnormal    Psychiatric:         Mood and Affect: Mood normal          Behavior: Behavior normal          Thought Content: Thought content normal          Discussion with patient/family and further instructions:  -Fall precautions  -Bleeding precautions  -Monitor for signs/symptoms of infection  -Medication list was reviewed   -cholecystostomy tube care  -home health services/blood work ordered    Follow-up Recommendations: Please follow-up with your primary care physician within 7-10 days of discharge to review medication changes and current status  Problem List Follow-up Recommendations:  26-year-old male with:    Acute on chronic cholecystitis  Recent WBC 6 0  Completed IV antibiotic course inpatient  Cholecystostomy tube to gravity drainage  Patient had some moderate drainage at insertion site last week and none noted today  Dressing is dry and intact  No abdominal pain, nausea, or vomiting  Follow-up with surgery in IR outpatient    Anemia  Hemoglobin trend with most recent 8 6<< 9 5<< 8 5 inpatient    Awaiting CBC results from today  Continue Niferex daily  Continue vitamin B complex and multivitamin  Recommend checking vitamin B12 outpatient    Paroxysmal atrial fibrillation (HCC)  Heart rate stable on sotalol 80 mg daily  Patient is on Coumadin for anticoagulation  Follow-up with cardiology as scheduled tomorrow    Chronic anticoagulation  Goal INR 2-3 in setting of atrial fibrillation  Most recent INR 3 0 and Coumadin dose was decreased from 4 mg daily to 3 5 mg daily  Awaiting PT/INR results from today  No signs of active bleeding noted  Follow-up with PCP/Cardiology for management    Type 2 diabetes mellitus with diabetic neuropathy, without long-term current use of insulin ([facility])    Lab Results   Component Value Date    HGBA1C 6 3 (H) 03/01/2022   Blood glucose was stable during SNF stay so Accu-Cheks were discontinued  Continue metformin 1000 mg daily   Continue gabapentin 100 mg daily for neuropathic pain  Follow-up with PCP for management    Chronic diastolic CHF (congestive heart failure) ([facility])  Wt Readings from Last 3 Encounters:   11/14/22 130 kg (286 lb)   10/25/22 (!) 137 kg (302 lb)   10/24/22 (!) 138 kg (305 lb 3 2 oz)   Previously on torsemide 60 mg which was discontinued inpatient due to REMA  Restarted patient on torsemide 20 mg daily due to severe pitting edema of bilateral lower extremities  Most recent creatinine 0 98  Awaiting BMP results from today  Patient denies having shortness of breath or chest pain  Follow-up with cardiology tomorrow 11/29/2022    CKD (chronic kidney disease) stage 3, GFR 30-59 ml/min ([facility])  Lab Results   Component Value Date    EGFR 72 11/14/2022    EGFR 68 11/13/2022    EGFR 71 11/12/2022    CREATININE 0 95 11/14/2022    CREATININE 0 99 11/13/2022    CREATININE 0 96 11/12/2022   Baseline creatinine 1 3  Recent REMA in setting of cholecystitis inpatient  Most recent creatinine 0 93/GFR 80 on 11/21/2022  Awaiting BMP results from today    Cholecystostomy care [facility])  Status post cholecystostomy tube exchange in IR 11/10/2022  No infection noted at insertion site  Patient's wife was educated by nursing staff on how to care for cholecystostomy tube  VNA also ordered    Physical deconditioning  Multifactorial  Patient participated in PT/OT during his SNF stay  Patient ambulating up to 120 ft with FWW and SBA, W/C to follow    As per physical therapy notes, patient ambulated with slow gait, increased trunk flexion, notable hip dropped during stance phase limited by fatigue  Recommended patient to continue PT/OT with home health services    I have spent >30 minutes with patient today in which greater than 50% of this time was spent in counseling/coordination of care regarding Diagnostic results, Intructions for management and Importance of tx compliance  PCP made aware of discharge summary via epic communications  This note was completed in part utilizing [employer] direct voice recognition software  Grammatical errors, random word insertion, spelling mistakes, and incomplete sentences may be an occasional consequence of the system secondary to software limitations, ambient noise and hardware issues  At the time of dictation, efforts were made to edit, clarify and/or correct errors  Please read the chart carefully and recognize, using context, where substitutions have occurred  If you have any questions or concerns about the context, text or information contained within the body of this dictation, please contact myself, the provider, for further clarification      Romelia Gore [address], [facility]  89/27/421852:63 AM

## 2022-11-29 ENCOUNTER — REMOTE DEVICE CLINIC VISIT (OUTPATIENT)
Dept: CARDIOLOGY CLINIC | Facility: CLINIC | Age: 87
End: 2022-11-29

## 2022-11-29 DIAGNOSIS — Z95.810 PRESENCE OF AUTOMATIC CARDIOVERTER/DEFIBRILLATOR (AICD): Primary | ICD-10-CM

## 2022-11-29 NOTE — PROGRESS NOTES
Results for orders placed or performed in visit on 11/29/22   Cardiac EP device report    Narrative    MDT DUAL CHAMBER ICD/ NOT MRI CONDITIONAL  CARELINK TRANSMISSION: BATTERY VOLTAGE ADEQUATE (2 5 YRS)  AP: 12 7%  : 0 1% (MVP-ON)  ALL AVAILABLE LEAD PARAMETERS WITHIN NORMAL LIMITS  NO SIGNIFICANT HIGH RATE EPISODES  APPROPRIATELY FUNCTIONING ICD    [address] [place] [address]

## 2022-11-30 ENCOUNTER — HOME CARE VISIT (OUTPATIENT)
Dept: HOME HEALTH SERVICES | Facility: HOME HEALTHCARE | Age: 87
End: 2022-11-30

## 2022-11-30 VITALS
OXYGEN SATURATION: 99 % | BODY MASS INDEX: 38.61 KG/M2 | HEART RATE: 83 BPM | WEIGHT: 291.3 LBS | DIASTOLIC BLOOD PRESSURE: 58 MMHG | RESPIRATION RATE: 18 BRPM | TEMPERATURE: 97.9 F | HEIGHT: 73 IN | SYSTOLIC BLOOD PRESSURE: 118 MMHG

## 2022-11-30 NOTE — CASE COMMUNICATION
[facility] has admitted your patient to [address] [place] [address] service with the following disciplines:      SN, PT and OT  This report is informational only, no responses is needed  Primary focus of home health care- GI   Patient stated goals of care-   Anticipated visit pattern and next visit date- Next SN visit 12/3 2w6  See medication list - meds in home differ from AVS  Significant clinical findings- While assessing pt SN attempted to flus h choli drain and encountered resistance  Wife stated she was having difficulty the night before and could not flush  Vista Fuel MD- GI general SX via TT  Messaged back stating drain is most likey clogged from stones and pt should contact IR at [phone] to set up appt for biliary tube study  Message was relayed to pt and he is in the process of setting up appt with IR  Potential barriers to goal achievement- continu ing choli drain clogging   Other pertinent information    Thank you for allowing us to participate in the care of your patient

## 2022-12-01 ENCOUNTER — HOSPITAL ENCOUNTER (OUTPATIENT)
Dept: RADIOLOGY | Facility: HOSPITAL | Age: 87
Discharge: HOME | End: 2022-12-01
Attending: SURGERY

## 2022-12-01 DIAGNOSIS — K81.0 ACUTE CHOLECYSTITIS: ICD-10-CM

## 2022-12-01 DIAGNOSIS — Z43.4 CHOLECYSTOSTOMY CARE (HCC): Primary | ICD-10-CM

## 2022-12-01 RX ORDER — LIDOCAINE HYDROCHLORIDE 10 MG/ML
INJECTION, SOLUTION EPIDURAL; INFILTRATION; INTRACAUDAL; PERINEURAL AS NEEDED
Status: COMPLETED | OUTPATIENT
Start: 2022-12-01 | End: 2022-12-01

## 2022-12-01 RX ORDER — SODIUM CHLORIDE 9 MG/ML
10 INJECTION INTRAVENOUS DAILY
Qty: 300 ML | Refills: 2 | Status: SHIPPED | OUTPATIENT
Start: 2022-12-01 | End: 2023-03-01

## 2022-12-01 RX ADMIN — IOHEXOL 10 ML: 300 INJECTION, SOLUTION INTRAVENOUS at 16:04

## 2022-12-01 RX ADMIN — LIDOCAINE HYDROCHLORIDE 10 ML: 10 INJECTION, SOLUTION EPIDURAL; INFILTRATION; INTRACAUDAL; PERINEURAL at 15:44

## 2022-12-01 NOTE — DISCHARGE INSTRUCTIONS
TUBE CARE INSTRUCTIONS    Care after your procedure:    Resume your normal diet  Small sips of flat soda will help with nausea  1  The properly functioning catheter should be forward flushed TWICE DAILY with 10ml of normal saline using clean technique  To flush the tube, clean both connections with alcohol swab  Twist off the drainage bag/ bulb  tubing and twist the saline syringe into the drainage tube and flush  Remove the syringe and twist the drainage bag / bulb tubing tubing back on     2  The drainage bag/bulb may be emptied as necessary  Keep a record of the amount of fluid you drain from your tube  This should be done with clean technique as well  3  A fresh dressing should be applied daily over the tube insertion site  4  As the tube is secured to the skin with only a suture,try not to pull on your tube  Tub baths are not permitted  Showers are permitted if the patient's skin entry site is prevented from getting wet  Similarly, washcloth "baths" are acceptable  Contact Interventional Radiology at [phone] [place] PATIENTS: Contact Interventional Radiology at [phone]) [place] PATIENTS: Contact Interventional Radiology at [phone]) if:    1  Leakage or large amounts of liquid around the catheter  2  Fever of 101 degrees lasting several hours without other obvious cause (such as sore throat, flu, etc)  3  Persistent nausea or vomiting  4  Diminished drainage, which may be associated with pressure or pain  Or when the     drainage from your tube is less than 10mls for 48 hours  5  Catheter pulled back or falls out  The following pharmacies carry the flush syringes         [facility]                     [facility]  [address] [facility]                         [phone]  [facility]  Phone [phone]            Phone [phone] [address] [facility]                                [phone]  [address] [place] [address] Joanne GRANADOS                      [address] [place]  Phone [phone]            Phone [phone]                      Elaine Sellers                                                                                                          [phone]  [facility]  [facility]    [address]  Phone [phone]

## 2022-12-01 NOTE — SEDATION DOCUMENTATION
Cholecystostomy tube exchange performed by Darlin Rogers  Procedure tolerated well  Patient instructed to flush tube twice daily

## 2022-12-01 NOTE — BRIEF OP NOTE (RAD/CATH)
IR CHOLECYSTOSTOMY TUBE CHECK/CHANGE/REPOSITION/REINSERTION/UPSIZE Procedure Note    PATIENT NAME: Selin Waldron  : 1935  MRN: [id number]    Pre-op Diagnosis:   1  Acute cholecystitis      Post-op Diagnosis:   1   Acute cholecystitis        Provider:   Fariha Santiago  Assistants:     No qualified resident was available, Resident is only observing    Estimated Blood Loss: none     Findings: occluded 14f jeny tube, exchange of 14f tube for new 14f tube,     Specimens: none    Complications:  None immediate, recommend flushing tube with 10 mL NS BID     Anesthesia: local    Fariha Santiago     Date: 2022  Time: 4:10 PM

## 2022-12-02 ENCOUNTER — OFFICE VISIT (OUTPATIENT)
Dept: CARDIOLOGY CLINIC | Facility: CLINIC | Age: 87
End: 2022-12-02

## 2022-12-02 VITALS
WEIGHT: 292.2 LBS | DIASTOLIC BLOOD PRESSURE: 62 MMHG | BODY MASS INDEX: 38.73 KG/M2 | SYSTOLIC BLOOD PRESSURE: 110 MMHG | HEIGHT: 73 IN | HEART RATE: 76 BPM

## 2022-12-02 DIAGNOSIS — I50.32 CHRONIC DIASTOLIC CHF (CONGESTIVE HEART FAILURE) (HCC): ICD-10-CM

## 2022-12-02 DIAGNOSIS — K81.0 ACUTE CHOLECYSTITIS: ICD-10-CM

## 2022-12-02 DIAGNOSIS — Z95.2 S/P AVR: ICD-10-CM

## 2022-12-02 DIAGNOSIS — E78.5 DYSLIPIDEMIA: ICD-10-CM

## 2022-12-02 DIAGNOSIS — I48.0 PAROXYSMAL ATRIAL FIBRILLATION (HCC): Primary | ICD-10-CM

## 2022-12-02 DIAGNOSIS — Z95.810 PRESENCE OF IMPLANTABLE CARDIOVERTER-DEFIBRILLATOR (ICD): ICD-10-CM

## 2022-12-02 RX ORDER — MIRTAZAPINE 7.5 MG/1
TABLET, FILM COATED ORAL
COMMUNITY
Start: 2022-11-30

## 2022-12-02 NOTE — PROGRESS NOTES
Cardiology   Zay Forman 81 y o  male MRN: [id number]        Impression:  1  s/p aortic valve replacement for endocarditis - doing well  Echo 5/11 Nml LV systolic function and normal bio AVR  2  Hypertension - improved control  3  s/p ICD for ventricular tachycardia  4  DVT - on warfarin   5  Paroxysmal atrial fibrillation - in NSR   on Sotalol (was decreased to daily when in hospital)  On anticoagulation    6  Chronic diastolic heart failure/PH/RV failure - stable  7  Cholecystitis - patient is at acceptable cardiovascular risk to proceed with open or lap jeny       Recommendations:  1  Increase torsemide to 20mg in the AM, 10mg in the afternoon  2  Continue remainder of medications  3  Proceed with surgery  4  Stop warfarin 5 days prior to surgery  5  Follow up in 4 months  HPI: Zay Forman is a 81y o  year old male with morbid obesity, diastolic HF with PH/RV failure, hypertension,  aortic valve replacement for endocarditis who returns for follow up  Admitted to hospital 5/22 with dyspnea/fluid overload - had preserved LV LV systolic function, mild-mod MR, mod TR, but elevated PAP and RV dysfunction  Recently had cholecystitis, and has a cholecystostomy tube - is slated for cholecystectomy in January 2023  Review of Systems   Constitutional: Negative  HENT: Negative  Eyes: Negative  Respiratory: Negative for chest tightness and shortness of breath  Cardiovascular: Negative for chest pain, palpitations and leg swelling  Gastrointestinal: Positive for abdominal pain  Endocrine: Negative  Genitourinary: Negative  Musculoskeletal: Negative  Skin: Negative  Allergic/Immunologic: Negative  Neurological: Negative  Hematological: Negative  Psychiatric/Behavioral: Negative  All other systems reviewed and are negative          Past Medical History:   Diagnosis Date   • Anemia    • Arthritis    • Atrial fibrillation (HCC)    • Basal cell carcinoma 03/22/2022    Tip of Nose   • CHF (congestive heart failure) (HCC)    • Diabetes mellitus ([facility] )     Niddm   • DVT (deep vein thrombosis) in pregnancy 1966    not in pregnancy   • DVT (deep venous thrombosis) ([facility] ) 1966   • Dyslipidemia    • Encephalopathy acute 11/4/2022   • GERD (gastroesophageal reflux disease)    • Hyperlipidemia    • Hypertension    • Hypomagnesemia 10/19/2022   • Irregular heart beat     Afib   • Morbid obesity due to excess calories ([facility] )     Resolved 9/2/2014    • Pulmonary embolism (HCC)    • Sepsis ([facility] )    • Squamous cell skin cancer 07/30/2020    Left posterior scalp   • Visual impairment      Past Surgical History:   Procedure Laterality Date   • AORTIC VALVE REPLACEMENT     • CARDIAC DEFIBRILLATOR PLACEMENT  04/2014   • CARDIAC SURGERY  02/2014    AVR   • COLONOSCOPY     • INSERT / REPLACE / REMOVE PACEMAKER     • IR CHOLECYSTOSTOMY TUBE CHECK/CHANGE/REPOSITION/REINSERTION/UPSIZE  10/13/2022   • IR CHOLECYSTOSTOMY TUBE CHECK/CHANGE/REPOSITION/REINSERTION/UPSIZE  10/19/2022   • IR CHOLECYSTOSTOMY TUBE CHECK/CHANGE/REPOSITION/REINSERTION/UPSIZE  10/27/2022   • IR CHOLECYSTOSTOMY TUBE CHECK/CHANGE/REPOSITION/REINSERTION/UPSIZE  11/7/2022   • IR CHOLECYSTOSTOMY TUBE CHECK/CHANGE/REPOSITION/REINSERTION/UPSIZE  11/10/2022   • IR CHOLECYSTOSTOMY TUBE CHECK/CHANGE/REPOSITION/REINSERTION/UPSIZE  12/1/2022   • IR CHOLECYSTOSTOMY TUBE PLACEMENT  9/1/2022   • JOINT REPLACEMENT Left     LTKR   • MOHS SURGERY  07/30/2020    Left posterior scalp, Dr Jing Leonard   • MOHS SURGERY  04/18/2022    BCC Tip of Nose- Dr Jing Leonard   • IL LIGATE/STRIP LONG [address] SEP-FEM JUNC Right 8/17/2018    Procedure: LEG PERFORATED INJECTION SCLEROTHERAPY;  Surgeon: Ji Mcdonald MD;  Location: [facility];  Service: Vascular   • REPLACEMENT TOTAL KNEE Right    • TOTAL KNEE ARTHROPLASTY Left    • VASCULAR SURGERY     • VENA CAVA FILTER PLACEMENT      Interruption inferior vena cava, Josue filter, placement   • WISDOM TOOTH EXTRACTION       Social History     Substance and Sexual Activity   Alcohol Use Not Currently    Comment: no alcohol in 28 yrs     Social History     Substance and Sexual Activity   Drug Use Never     Social History     Tobacco Use   Smoking Status Never   Smokeless Tobacco Never     Family History   Problem Relation Age of Onset   • Arthritis Mother    • Stroke Mother    • Arthritis Father    • Cancer Father    • Arthritis Daughter        Allergies: Allergies   Allergen Reactions   • Tramadol Other (See Comments)     intolerance       Medications:     Current Outpatient Medications:   •  Acetaminophen (TYLENOL EXTRA STRENGTH PO), Take 500 mg by mouth if needed (for pain as needed)  Indications: pain as needed, Disp: , Rfl:   •  albuterol (PROVENTIL HFA,VENTOLIN HFA) 90 mcg/act inhaler, Inhale 2 puffs every 6 (six) hours as needed for wheezing or shortness of breath, Disp: , Rfl:   •  allopurinol (ZYLOPRIM) 100 mg tablet, Take 1 tablet (100 mg total) by mouth daily Do not start before November 15, 2022 , Disp: , Rfl:   •  atorvastatin (LIPITOR) 40 mg tablet, Take 40 mg by mouth daily after dinner Atorvastatin Calcium 40 MG Oral Tablet Take 1 tablet daily  Refills: 0  Active , Disp: , Rfl:   •  B Complex-C (SUPER B COMPLEX PO), Take 1 capsule by mouth daily , Disp: , Rfl:   •  Diclofenac Sodium (VOLTAREN) 1 %, Apply 2 g topically 4 (four) times a day, Disp: , Rfl:   •  fluticasone (FLONASE) 50 mcg/act nasal spray, 2 sprays into each nostril daily as needed Shake liquid and spray, Disp: , Rfl:   •  gabapentin (NEURONTIN) 100 mg capsule, Take 100 mg by mouth daily, Disp: , Rfl:   •  Iron Combinations (NIFEREX) TABS, Take 1 tablet by mouth in the morning, Disp: , Rfl: 5  •  metFORMIN (GLUCOPHAGE) 1000 MG tablet, Take 1,000 mg by mouth daily with dinner   Indications: Type 2 Diabetes, Disp: , Rfl:   •  mirtazapine (REMERON) 7 5 MG tablet, , Disp: , Rfl:   •  Multiple Vitamin (MULTIVITAMINS PO), Take 1 tablet by mouth daily, Disp: , Rfl:   •  omeprazole (PriLOSEC) 20 mg delayed release capsule, Omeprazole 20 MG Oral Tablet Delayed Release Take 1 tablet daily  Refills: 0  Active, Disp: , Rfl:   •  polyethylene glycol (GLYCOLAX) 17 GM/SCOOP powder, Take 17 g by mouth 2 (two) times a day, Disp: , Rfl:   •  sodium chloride, PF, 0 9 %, 5 mL by Intracatheter route daily Intracatheter flushing daily, Disp: 150 mL, Rfl: 1  •  sodium chloride, PF, 0 9 %, 10 mL by Intracatheter route daily Intracatheter flushing daily  May substitute prefilled syringe with normal saline 10 mL vials, 10 mL syringes, and 18 g blunt needles, Disp: 300 mL, Rfl: 2  •  sotalol (BETAPACE) 80 mg tablet, Take 1 tablet (80 mg total) by mouth daily, Disp: , Rfl: 0  •  torsemide (DEMADEX) 20 mg tablet, Take 20 mg by mouth daily, Disp: , Rfl:   •  warfarin (COUMADIN) 2 mg tablet, Take 2 tablets (4 mg total) by mouth daily Acknowledge, Disp: 14 tablet, Rfl: 0  No current facility-administered medications for this visit  Wt Readings from Last 3 Encounters:   12/02/22 133 kg (292 lb 3 2 oz)   11/30/22 132 kg (291 lb 4 8 oz)   11/14/22 130 kg (286 lb)     Temp Readings from Last 3 Encounters:   11/30/22 97 9 °F (36 6 °C) (Temporal)   11/14/22 (!) 97 4 °F (36 3 °C)   10/31/22 (!) 96 7 °F (35 9 °C)     BP Readings from Last 3 Encounters:   12/02/22 110/62   11/30/22 118/58   11/14/22 117/66     Pulse Readings from Last 3 Encounters:   12/02/22 76   11/30/22 83   11/14/22 68         Physical Exam  HENT:      Head: Atraumatic  Mouth/Throat:      Mouth: Mucous membranes are moist    Eyes:      Extraocular Movements: Extraocular movements intact  Cardiovascular:      Rate and Rhythm: Normal rate and regular rhythm  Heart sounds: Normal heart sounds  Pulmonary:      Effort: Pulmonary effort is normal       Breath sounds: Normal breath sounds  Abdominal:      General: Abdomen is flat  Musculoskeletal:         General: Normal range of motion  Cervical back: Normal range of motion  Skin:     General: Skin is warm  Neurological:      General: No focal deficit present  Mental Status: He is alert and oriented to person, place, and time  Psychiatric:         Mood and Affect: Mood normal          Behavior: Behavior normal            Laboratory Studies:  CMP:  Lab Results   Component Value Date     10/10/2015    K 3 9 2022     2022    CO2 23 2022    ANIONGAP 8 10/10/2015    BUN 17 2022    CREATININE 0 95 2022    GLUCOSE 121 10/10/2015    AST 33 2022    ALT 13 2022    BILITOT 2 23 (H) 10/10/2015    EGFR 72 2022         Cardiac testing:   EKG reviewed personally: NSR 76 PAC 1st deg AV block LBBB  Results for orders placed during the hospital encounter of 20    Echo complete with contrast if indicated    Narrative  [address]  [phone]    Transthoracic Echocardiogram  2D, M-mode, Doppler, and Color Doppler    Study date:  2020    Patient: Lissett Villasenor  MR number: [id number]  Account number: [de-identified]  : 1935  Age: 81 years  Gender: Male  Status: Outpatient  Location: [address] [place] [address] [facility]  Height: 73 in  Weight: 351 3 lb  BP: 152/ 73 mmHg    Indications: PAF; Acute on chronic diastolic HF;s/p AVR  Diagnoses: I35 9 - Nonrheumatic aortic valve disorder, unspecified, I48 0 - Atrial fibrillation, I50 9 - Heart failure, unspecified    Sonographer:  ABBY Matthews  Interpreting Physician:  Jensen Bernardo MD  Primary Physician:  Abimael   Referring Physician:  Teja Rodríguez MD  Group:  [facility]  Cardiology Fellow:  Kaden Campa DO    SUMMARY    PROCEDURE INFORMATION:  This was a technically difficult study    Echocardiographic views were limited due to restricted patient mobility, poor patient compliance, poor acoustic window availability, decreased penetration, and lung interference  LEFT VENTRICLE:  Systolic function was normal  Ejection fraction was estimated to be 55 %  Although no diagnostic regional wall motion abnormality was identified, this possibility cannot be completely excluded on the basis of this study  Wall thickness was mildly to moderately increased  There was mild concentric hypertrophy  RIGHT VENTRICLE:  The ventricle was mildly dilated  Wall thickness was mildly increased  LEFT ATRIUM:  The atrium was mildly to moderately dilated  RIGHT ATRIUM:  The atrium was mildly to moderately dilated  MITRAL VALVE:  There was mild to moderate annular calcification  There was mild regurgitation  AORTIC VALVE:  A bioprosthesis was present  It exhibited normal function  The peak valve velocity was 197 cm/s  The mean valve velocity was 152 cm/s  Valve peak gradient was 16 mmHg  Valve mean gradient was 9 mmHg  Estimated aortic valve area (by VTI) was 2 16 cmï¾²  TRICUSPID VALVE:  There was mild regurgitation  Pulmonary artery systolic pressure was mildly increased  Estimated peak PA pressure was 40 mmHg  PULMONIC VALVE:  There was mild regurgitation  HISTORY: PRIOR HISTORY: HTN;s/p ICD;VT;SOB; Morbid obesity  PROCEDURE: The study was performed in the [address]  This was a routine study  The transthoracic approach was used  The study included complete 2D imaging, M-mode, complete spectral Doppler, and color Doppler  The  heart rate was 67 bpm, at the start of the study  Images were obtained from the parasternal, apical, subcostal, and suprasternal notch acoustic windows  Echocardiographic views were limited due to restricted patient mobility, poor patient  compliance, poor acoustic window availability, decreased penetration, and lung interference  This was a technically difficult study  LEFT VENTRICLE: Size was normal  Systolic function was normal  Ejection fraction was estimated to be 55 %  Although no diagnostic regional wall motion abnormality was identified, this possibility cannot be completely excluded on the basis  of this study  Wall thickness was mildly to moderately increased  There was mild concentric hypertrophy  RIGHT VENTRICLE: The ventricle was mildly dilated  Systolic function was low normal  Wall thickness was mildly increased  A pacing wire was present in the ventricular cavity  LEFT ATRIUM: The atrium was mildly to moderately dilated  RIGHT ATRIUM: The atrium was mildly to moderately dilated  MITRAL VALVE: There was mild to moderate annular calcification  There was normal leaflet separation  DOPPLER: The transmitral velocity was within the normal range  There was no evidence for stenosis  There was mild regurgitation  AORTIC VALVE: A bioprosthesis was present  It exhibited normal function  TRICUSPID VALVE: The valve structure was normal  There was normal leaflet separation  DOPPLER: The transtricuspid velocity was within the normal range  There was no evidence for stenosis  There was mild regurgitation  Pulmonary artery  systolic pressure was mildly increased  Estimated peak PA pressure was 40 mmHg  PULMONIC VALVE: Leaflets exhibited normal thickness, no calcification, and normal cuspal separation  DOPPLER: The transpulmonic velocity was within the normal range  There was mild regurgitation  PERICARDIUM: There was no pericardial effusion  AORTA: The root exhibited normal size  SYSTEMIC VEINS: IVC: The inferior vena cava was not well visualized      MEASUREMENT TABLES    2D MEASUREMENTS  LVOT   (Reference normals)  Diam   23 mm   (--)    DOPPLER MEASUREMENTS  LVOT   (Reference normals)  Peak annie   101 cm/s   (--)  Mean annie   70 cm/s   (--)  VTI   23 cm   (--)  Peak gradient   4 mmHg   (--)  Mean gradient   2 2 mmHg   (--)  Stroke vol   95 56 ml   (--)  Aortic valve   (Reference normals)  Peak annie   197 cm/s   (--)  Mean annie   152 cm/s   (--)  VTI   44 cm (--)  Peak gradient   16 mmHg   (--)  Mean gradient   9 mmHg   (--)  Obstr index, VTI   0 52    (--)  Valve area, VTI   2 16 cmï¾²   (--)  Area index, VTI   0 79 cmï¾²/mï¾²   (--)  Obstr index, Vmax   0 51    (--)  Valve area, Vmax   2 12 cmï¾²   (--)  Area index, Vmax   0 77 cmï¾²/mï¾²   (--)  Obstr index, Vmean   0 46    (--)  Valve area, Vmean   1 91 cmï¾²   (--)  Area index, Vmean   0 7 cmï¾²/mï¾²   (--)    SYSTEM MEASUREMENT TABLES    2D  %FS: 22 82 %  Ao Diam: 2 49 cm  EDV(Teich): 57 38 ml  EF(Teich): 46 66 %  ESV(Teich): 30 61 ml  IVSd: 1 96 cm  LA Area: 26 79 cm2  LA Diam: 4 97 cm  LVEDV MOD A4C: 112 1 ml  LVEF MOD A4C: 50 26 %  LVESV MOD A4C: 55 76 ml  LVIDd: 3 68 cm  LVIDs: 2 84 cm  LVLd A4C: 8 11 cm  LVLs A4C: 6 99 cm  LVOT Diam: 2 25 cm  LVPWd: 1 98 cm  RA Area: 24 17 cm2  RVIDd: 4 17 cm  SV MOD A4C: 56 34 ml  SV(Teich): 26 78 ml    CW  AV Env  Ti: 296 86 ms  AV VTI: 39 2 cm  AV Vmax: 2 1 m/s  AV Vmean: 1 32 m/s  AV maxP 68 mmHg  AV meanP 71 mmHg  TR Vmax: 2 85 m/s  TR maxP 45 mmHg    MM  TAPSE: 1 69 cm    PW  KISHA (VTI): 2 34 cm2  KISHA Vmax: 1 91 cm2  AVAI (VTI): 0 cm2/m2  AVAI Vmax: 0 cm2/m2  E' Sept: 0 06 m/s  E/E' Sept: 19 55  LVOT Env  Ti: 327 31 ms  LVOT VTI: 22 97 cm  LVOT Vmax: 1 01 m/s  LVOT Vmean: 0 7 m/s  LVOT maxP 04 mmHg  LVOT meanP 24 mmHg  LVSI Dopp: 33 45 ml/m2  LVSV Dopp: 91 66 ml  MV A Juan: 1 43 m/s  MV Dec Sawyer: 5 21 m/s2  MV DecT: 213 91 ms  MV E Juan: 1 11 m/s  MV E/A Ratio: 0 78  MV PHT: 62 03 ms  MVA By PHT: 3 55 cm2    [facility] Accredited Echocardiography Laboratory    Prepared and electronically signed by    Kenneth Linares MD  Signed [id number] 10:03:59

## 2022-12-02 NOTE — PATIENT INSTRUCTIONS
Recommendations:  1  Increase torsemide to 20mg in the AM, 10mg in the afternoon  2  Continue remainder of medications  3  Proceed with surgery  4  Stop warfarin 5 days prior to surgery  5  Follow up in 4 months

## 2022-12-03 ENCOUNTER — HOME CARE VISIT (OUTPATIENT)
Dept: HOME HEALTH SERVICES | Facility: HOME HEALTHCARE | Age: 87
End: 2022-12-03

## 2022-12-03 VITALS
SYSTOLIC BLOOD PRESSURE: 100 MMHG | TEMPERATURE: 98.2 F | DIASTOLIC BLOOD PRESSURE: 60 MMHG | OXYGEN SATURATION: 98 % | RESPIRATION RATE: 20 BRPM | HEART RATE: 80 BPM | BODY MASS INDEX: 38.52 KG/M2 | WEIGHT: 292 LBS

## 2022-12-05 ENCOUNTER — HOME CARE VISIT (OUTPATIENT)
Dept: HOME HEALTH SERVICES | Facility: HOME HEALTHCARE | Age: 87
End: 2022-12-05

## 2022-12-05 VITALS — SYSTOLIC BLOOD PRESSURE: 118 MMHG | DIASTOLIC BLOOD PRESSURE: 58 MMHG | OXYGEN SATURATION: 98 % | HEART RATE: 68 BPM

## 2022-12-06 ENCOUNTER — HOME CARE VISIT (OUTPATIENT)
Dept: HOME HEALTH SERVICES | Facility: HOME HEALTHCARE | Age: 87
End: 2022-12-06

## 2022-12-06 VITALS
RESPIRATION RATE: 18 BRPM | HEART RATE: 73 BPM | DIASTOLIC BLOOD PRESSURE: 56 MMHG | SYSTOLIC BLOOD PRESSURE: 112 MMHG | TEMPERATURE: 97.5 F | OXYGEN SATURATION: 99 %

## 2022-12-07 VITALS — DIASTOLIC BLOOD PRESSURE: 60 MMHG | SYSTOLIC BLOOD PRESSURE: 116 MMHG | OXYGEN SATURATION: 98 % | HEART RATE: 78 BPM

## 2022-12-07 NOTE — CASE COMMUNICATION
Patient with vomiting last night and diarrhea x 2 this AM   No pain or fever  He was instructed to contact PCP if symptoms persist and was reminded of [facility] 24 hour on-call SN if needed  OT lisa completed this date and Plan of Care established for 2week3 to focus on ADL training, ADL transfer training, BUE ther ex with HEP and patient education to energy conservation/work simplification techniques

## 2022-12-09 ENCOUNTER — TELEPHONE (OUTPATIENT)
Dept: CARDIOLOGY CLINIC | Facility: CLINIC | Age: 87
End: 2022-12-09

## 2022-12-09 ENCOUNTER — HOME CARE VISIT (OUTPATIENT)
Dept: HOME HEALTH SERVICES | Facility: HOME HEALTHCARE | Age: 87
End: 2022-12-09

## 2022-12-09 VITALS — OXYGEN SATURATION: 98 % | SYSTOLIC BLOOD PRESSURE: 90 MMHG | DIASTOLIC BLOOD PRESSURE: 54 MMHG | HEART RATE: 62 BPM

## 2022-12-09 VITALS
HEART RATE: 81 BPM | RESPIRATION RATE: 18 BRPM | OXYGEN SATURATION: 96 % | DIASTOLIC BLOOD PRESSURE: 48 MMHG | SYSTOLIC BLOOD PRESSURE: 100 MMHG | TEMPERATURE: 97.6 F

## 2022-12-09 NOTE — TELEPHONE ENCOUNTER
Patient:   SYLWIA PICKETT            MRN: [id number]            FIN: [id number]              Age:   51 years     Sex:  MALE     :  [birth year]   Associated Diagnoses:   None   Author:   ONDINA DONOVAN           Chief Complaint   Hematemesis     Subjective  Patient underwent repeat EGD yesterday with additional 7 bands placed for varicies. Total x14 placed to date  Mentation improved. Reports to be feeling well.     Histories   Past Med History:    No problem items selected or recorded., Past Medical History   Alcohol abuse  Diabetes mellitus  Diabetic on diet only  H/O: blood transfusion  Hypertension  Hypertension    Family History:    CA - Cancer  MOTHER  Diabetes mellitus type 2  MOTHER  BROTHER  Hypertension  MOTHER  FATHER    Procedure History:    Drainage of abscess ([id number]).   Social History       Alcohol  Details: Alcohol Abuse in Household: Yes.  Use: Current.  If current Alcohol user: More than 5 (M) or 4 (F) drinks within a couple of hours? Yes.  Details: Alcohol Abuse in Household: Yes.  Use: Current.  If current Alcohol user: More than 5 (M) or 4 (F) drinks within a couple of hours? Yes.  Exercise  Details: Exercise: Never.; Comment(s): \" Iusedto excercise not anymore\"  Sexual  Details: Sexual orientation: Straight or heterosexual.  Gender Identity: Identifies as male.  Gender on Ins: Male.  Preferred Pronouns: Male.  Substance Abuse  Details: Substance Abuse in Household: No.  Use: None.  Tobacco  Details: Smoker in [place]: No.  Smoked/Smokeless Tobacco Last 30 Days: No.  Smoking Tobacco Use: Former smoker.  Smokeless Tobacco Use Never.; Comment(s): Patient states\" Last time I smoked is when I was young\".  Details: Smoker in [place]: No.  Smoked/Smokeless Tobacco Last 30 Days: No.  Smoking Tobacco Use: Never smoker.  Smokeless Tobacco Use Never.  Cultural/Mormonism Practices  Details: Mormonism or Cultural Practices: Alevism.  Mormonism or Cultural Practices While in Hospital: Yes.  .    Lulú Rollins from [facility] called was out for a visit today  P/C of fatigued, and no energy  HR taken X 3 44,46,47 at rest taken for a full min each time  After exercise came back up to 97 HR  BP today 102/50  Last week HR 48 did come back up  Pt denies any dizziness, or lightheadedness  LL slight edema        OV 12/2   Increase torsemide to 20mg in the AM, 10mg in the afternoon    Please advise     Health Status   Allergies:    Allergic Reactions (All)  NKA  Canceled/Inactive Reactions (All)  Severe  Minocycline- No reactions were documented.   Current medications:  (Selected)   Inpatient Medications  Ordered  Carnitor: 1,000 mg, 5 mL, IV Push, Q6H  Keflex oral 500 mg capsule: 500 mg, 1 cap, Oral, Q8H  Protonix oral 20 mg DR tablet: 20 mg, 1 tab, Oral, Daily  dextrose (glucose) injection 50%.: 12.5 gm, 25 mL, IV Push, As Directed PRN, PRN: low blood glucose  dextrose (glucose) oral.: 15 gm, Oral, As Directed PRN, PRN: low blood glucose  glucagon (GlucaGen).: 1 mg, 1 mL, IM, As Directed PRN, PRN: low blood glucose  insulin lispro (HumaLOG) dose correction.: 1-6 units, Subcutaneous, QID [with meals & HS]  ondansetron (Zofran).: 4 mg, 2 mL, Slow IV Push, Q6H, PRN: nausea or vomiting  propranolol oral 10 mg tablet (Inderal): 10 mg, Oral, Q12H  Documented Medications  Documented  cholecalciferol 10,000 intl units oral capsule: 10,000 unit, 1 cap, Oral, Daily  glipiZIDE oral 5 mg tablet: 5 mg, 1 tab, Oral, BID,    Medications (9) Active  Scheduled: (5)  Cephalexin 500 mg cap  500 mg 1 cap, Oral, Q8H  Insulin human lispro 10 unit/0.1 mL inj  1-6 units, Subcutaneous, QID [with meals & HS]  LevOCARNitine 1,000 mg/5 mL inj  1,000 mg 5 mL, IV Push, Q6H  Pantoprazole 20 mg DR tab  20 mg 1 tab, Oral, Daily  propranolol  10 mg, Oral, Q12H  Continuous: (0)  PRN: (4)  Dextrose (glucose) 40% 15 gm/37.5 gm oral gel UD  15 gm, Oral, As Directed PRN  Dextrose (glucose) 50% 25 gm/50 mL syringe  12.5 gm 25 mL, IV Push, As Directed PRN  Glucagon 1 mg/1 mL emergency kit SDV  1 mg 1 mL, IM, As Directed PRN  Ondansetron 4 mg/2 mL inj SDV  4 mg 2 mL, Slow IV Push, Q6H      Physical Examination   VS/Measurements     Vitals between:   08-JUN-2019 12:29:47   TO   09-JUN-2019 12:29:47                   LAST RESULT MINIMUM MAXIMUM  Temperature 36.9 36.7 37.0  Heart Rate 77 77 107  Respiratory Rate 15 11 24  NISBP           128 108  158  NIDBP           84 59 93  NIMBP           99 75 110  SpO2                    99 96 100    General:  No acute distress, Alert, Bilateral asterixis, Oriented to person and place only.   Eye:  Extraocular movements are intact, Normal conjunctiva, No scleral icterus.   HENT:  Normocephalic, Normal hearing.    Respiratory:  Lungs are clear to auscultation, Respirations are non-labored.   Cardiovascular:  Normal rate, Regular rhythm, No murmur, Normal peripheral perfusion.   Gastrointestinal:  Soft, Non-distended, Mild tenderness to right upper quadrant.   Neurologic:  Alert, No focal deficits, Oriented to person and place only.      Review / Management   Laboratory results:     Labs between:  08-JUN-2019 12:29 to 09-JUN-2019 12:29  CBC:                 WBC  HgB  Hct  Plt  MCV  RDW   09-JUN-2019   (L) 7.5  (L) 22.7         09-JUN-2019 8.7  (L) 7.7  (L) 23.3  (L) 106  93.2  (H) 19.1   08-JUN-2019   (L) 8.0  (L) 24.2         DIFF:                 Seg  Neutroph//ABS  Lymph//ABS  Mono//ABS  EOS/ABS  09-JUN-2019 NOT APPLICABLE  61 // 5.3 28 // 2.4 8 // 0.7 1 // 0.1  BMP:                 Na  Cl  BUN  Glu   09-JUN-2019 145  (H) 113  (H) 25  (H) 129                              K  CO2  Cr  Ca                              3.9  27  (L) 0.64  (L) 7.7   CMP:                 AST  ALT  AlkPhos  Bili  Albumin   09-JUN-2019 35  23  47  (H) 1.7  (L) 2.4   Other Chem:             Mg  Phos  Triglycerides  GGTP  DirectBili                           2.4  2.5         POC GLU:                 Latest Result  Latest Date  Minimum  Min Date  Maximum  Max Date                             (H) 163  09-JUN-2019 (H) 163  09-JUN-2019 (H) 124  08-JUN-2019  COAG:                 INR  PT  PTT  Ddimer  Fibrinogen    09-JUN-2019 1.2  (H) 12.5                        .      Impression and Plan   Assessment:  Patient is a 51-year-old male with history of alcohol use disorder and DM 2 presenting for hematemesis. Found to have esophageal varicies now s/p  varicies banding x7.  1. Neuro  #Hepatic encephalopathy  -Patient oriented to person and place only  -Ammonia: 129  PLAN  -Lactulose 10g TID  -Carnitor 1000mg q6h  -Monitor for improvement  2. Cardiovascular  #Troponemia  -2/2 demand ischemia  -Troponin: 0.13>0.25>0.09  -ECG shows sinus tach and left axis deviation  3. Pulmonary  -was intubated for airway protection 6/6  -extubated 6/7  -no active issues  4. GI  #Alcoholic cirrhosis  #Esophageal varices bleed  -Patient with significant alcohol history with recent binge drinking episode  -S/p esophageal banding x7 on 6/6, x7 on 6/8  -MELD: 13  -Discriminant function: 25.4  -US liver: Heterogenous liver. No lesions.  PLAN  -S/p octreotide gtt  -Keflex x5 days  -Pantoprazole 20mg q daily  -Monitor LFTs, INR, T. billirubin  -Counseled family and patient regarding complete alcohol cessation  -GI following   -Propranolol to be started near discharge  5. Nephrology  -No acute issues  6. Hematology  #Acute blood loss anemia 2/2 esophageal varices bleed  -Hgb 7.7, baseline ~12.5  PLAN  -S/p esophageal banding x14  -S/p 2 units PRBC transfusion  -H&H this PM  7. Endocrinology  #DM2  -LDSS  8. Infectious Disease  -No acute issues  Fluids: None  DVT ppx: SCDs. Holding chemical AC 2/2 GI bleed  GI ppx: pantoprazole  Nutrition: CLD  Bowel regimen: None  Antibiotics: Keflex  Restraints: None  LINES  Peripheral Intravenous Antecubital Right   Gauge: 20   Charted: 06/09/19 09:44  Inserted: 06/06/19   Days Since Insertion: 3 days  Indication of Use: Saline Lock  Peripheral Intravenous Antecubital Left   Gauge: 18   Charted: 06/09/19 09:44  Inserted: 06/06/19   Days Since Insertion: 3 days  Indication of Use: Saline Lock  Code Status: FULL CODE  Discussed with attending, Dr. Keyonna Arita DO  PGY-1, Internal Medicine

## 2022-12-10 VITALS — SYSTOLIC BLOOD PRESSURE: 102 MMHG | HEART RATE: 97 BPM | OXYGEN SATURATION: 94 % | DIASTOLIC BLOOD PRESSURE: 50 MMHG

## 2022-12-12 ENCOUNTER — HOME CARE VISIT (OUTPATIENT)
Dept: HOME HEALTH SERVICES | Facility: HOME HEALTHCARE | Age: 87
End: 2022-12-12

## 2022-12-12 VITALS — SYSTOLIC BLOOD PRESSURE: 126 MMHG | DIASTOLIC BLOOD PRESSURE: 67 MMHG | OXYGEN SATURATION: 98 % | HEART RATE: 79 BPM

## 2022-12-12 VITALS — DIASTOLIC BLOOD PRESSURE: 60 MMHG | HEART RATE: 80 BPM | OXYGEN SATURATION: 99 % | SYSTOLIC BLOOD PRESSURE: 128 MMHG

## 2022-12-13 ENCOUNTER — TELEPHONE (OUTPATIENT)
Dept: HOME HEALTH SERVICES | Facility: HOME HEALTHCARE | Age: 87
End: 2022-12-13

## 2022-12-13 ENCOUNTER — HOME CARE VISIT (OUTPATIENT)
Dept: HOME HEALTH SERVICES | Facility: HOME HEALTHCARE | Age: 87
End: 2022-12-13

## 2022-12-13 ENCOUNTER — TELEPHONE (OUTPATIENT)
Dept: CARDIOLOGY CLINIC | Facility: CLINIC | Age: 87
End: 2022-12-13

## 2022-12-13 NOTE — TELEPHONE ENCOUNTER
Called and spoke to pt, he advised HR's as of 12/13/2022 HR's 60,52,22,13 seem to be back to normal      Advised to continue to watch and track, and let us know if drops again, or has any s/s to call office       Pt verbally understood

## 2022-12-14 ENCOUNTER — HOME CARE VISIT (OUTPATIENT)
Dept: HOME HEALTH SERVICES | Facility: HOME HEALTHCARE | Age: 87
End: 2022-12-14

## 2022-12-14 VITALS
DIASTOLIC BLOOD PRESSURE: 68 MMHG | HEART RATE: 98 BPM | SYSTOLIC BLOOD PRESSURE: 128 MMHG | TEMPERATURE: 97.8 F | RESPIRATION RATE: 17 BRPM | OXYGEN SATURATION: 94 %

## 2022-12-14 VITALS — HEART RATE: 68 BPM | SYSTOLIC BLOOD PRESSURE: 98 MMHG | DIASTOLIC BLOOD PRESSURE: 66 MMHG | OXYGEN SATURATION: 98 %

## 2022-12-15 ENCOUNTER — HOME CARE VISIT (OUTPATIENT)
Dept: HOME HEALTH SERVICES | Facility: HOME HEALTHCARE | Age: 87
End: 2022-12-15

## 2022-12-15 VITALS — HEART RATE: 78 BPM | DIASTOLIC BLOOD PRESSURE: 69 MMHG | OXYGEN SATURATION: 100 % | SYSTOLIC BLOOD PRESSURE: 121 MMHG

## 2022-12-16 ENCOUNTER — HOME CARE VISIT (OUTPATIENT)
Dept: HOME HEALTH SERVICES | Facility: HOME HEALTHCARE | Age: 87
End: 2022-12-16

## 2022-12-16 VITALS
DIASTOLIC BLOOD PRESSURE: 70 MMHG | OXYGEN SATURATION: 98 % | RESPIRATION RATE: 18 BRPM | SYSTOLIC BLOOD PRESSURE: 128 MMHG | TEMPERATURE: 97.8 F | HEART RATE: 61 BPM

## 2022-12-17 ENCOUNTER — HOME CARE VISIT (OUTPATIENT)
Dept: HOME HEALTH SERVICES | Facility: HOME HEALTHCARE | Age: 87
End: 2022-12-17

## 2022-12-17 VITALS
BODY MASS INDEX: 38.66 KG/M2 | SYSTOLIC BLOOD PRESSURE: 104 MMHG | OXYGEN SATURATION: 99 % | DIASTOLIC BLOOD PRESSURE: 50 MMHG | HEART RATE: 85 BPM | TEMPERATURE: 97.8 F | WEIGHT: 293 LBS

## 2022-12-18 NOTE — CASE COMMUNICATION
12 17 22 at 1900   I spoke with pt who states he has a new wound on right inner leg near ankle area  States Kathryn RN saw him yesterday and called Dr MERCY [facility]  Dermatology and received orders for wound   Wife is able to change dressings but pt called to say in past 1 5 hrs he has saturated through all dressings,  through clothes onto furniture  x2    States he wants  a nurse to assess wound this evening  Also states no one is on c all for Dr MERCY [facility]"" [facility]

## 2022-12-19 ENCOUNTER — HOME CARE VISIT (OUTPATIENT)
Dept: HOME HEALTH SERVICES | Facility: HOME HEALTHCARE | Age: 87
End: 2022-12-19

## 2022-12-19 VITALS — HEART RATE: 75 BPM | SYSTOLIC BLOOD PRESSURE: 109 MMHG | OXYGEN SATURATION: 99 % | DIASTOLIC BLOOD PRESSURE: 66 MMHG

## 2022-12-20 ENCOUNTER — HOME CARE VISIT (OUTPATIENT)
Dept: HOME HEALTH SERVICES | Facility: HOME HEALTHCARE | Age: 87
End: 2022-12-20

## 2022-12-20 VITALS
SYSTOLIC BLOOD PRESSURE: 122 MMHG | OXYGEN SATURATION: 97 % | DIASTOLIC BLOOD PRESSURE: 64 MMHG | TEMPERATURE: 97.4 F | HEART RATE: 78 BPM | RESPIRATION RATE: 16 BRPM

## 2022-12-20 VITALS — HEART RATE: 78 BPM

## 2022-12-21 ENCOUNTER — HOME CARE VISIT (OUTPATIENT)
Dept: HOME HEALTH SERVICES | Facility: HOME HEALTHCARE | Age: 87
End: 2022-12-21

## 2022-12-22 ENCOUNTER — HOME CARE VISIT (OUTPATIENT)
Dept: HOME HEALTH SERVICES | Facility: HOME HEALTHCARE | Age: 87
End: 2022-12-22

## 2022-12-22 VITALS — OXYGEN SATURATION: 98 % | DIASTOLIC BLOOD PRESSURE: 60 MMHG | SYSTOLIC BLOOD PRESSURE: 112 MMHG | HEART RATE: 77 BPM

## 2022-12-22 NOTE — CASE COMMUNICATION
OT discharge this date as planned due to goals met to highest level of safe function  Patient reports surgery planned for January 23 for gall bladder removal   Patient independent with sink bathing and dressing except for Min A with compression stockings by spouse as assistive devices were not helpful

## 2022-12-23 ENCOUNTER — HOME CARE VISIT (OUTPATIENT)
Dept: HOME HEALTH SERVICES | Facility: HOME HEALTHCARE | Age: 87
End: 2022-12-23

## 2022-12-26 ENCOUNTER — HOME CARE VISIT (OUTPATIENT)
Dept: HOME HEALTH SERVICES | Facility: HOME HEALTHCARE | Age: 87
End: 2022-12-26

## 2022-12-26 VITALS
SYSTOLIC BLOOD PRESSURE: 146 MMHG | OXYGEN SATURATION: 99 % | RESPIRATION RATE: 18 BRPM | HEART RATE: 78 BPM | DIASTOLIC BLOOD PRESSURE: 92 MMHG | TEMPERATURE: 98 F

## 2022-12-27 ENCOUNTER — HOME CARE VISIT (OUTPATIENT)
Dept: HOME HEALTH SERVICES | Facility: HOME HEALTHCARE | Age: 87
End: 2022-12-27

## 2022-12-29 ENCOUNTER — HOME CARE VISIT (OUTPATIENT)
Dept: HOME HEALTH SERVICES | Facility: HOME HEALTHCARE | Age: 87
End: 2022-12-29

## 2022-12-29 VITALS
RESPIRATION RATE: 18 BRPM | OXYGEN SATURATION: 95 % | DIASTOLIC BLOOD PRESSURE: 66 MMHG | TEMPERATURE: 97.9 F | SYSTOLIC BLOOD PRESSURE: 120 MMHG | HEART RATE: 95 BPM

## 2022-12-30 ENCOUNTER — HOME CARE VISIT (OUTPATIENT)
Dept: HOME HEALTH SERVICES | Facility: HOME HEALTHCARE | Age: 87
End: 2022-12-30

## 2022-12-30 VITALS — DIASTOLIC BLOOD PRESSURE: 64 MMHG | OXYGEN SATURATION: 98 % | SYSTOLIC BLOOD PRESSURE: 120 MMHG | HEART RATE: 74 BPM

## 2023-01-01 ENCOUNTER — APPOINTMENT (INPATIENT)
Dept: RADIOLOGY | Facility: HOSPITAL | Age: 88
DRG: 871 | End: 2023-01-01
Payer: MEDICARE

## 2023-01-01 ENCOUNTER — APPOINTMENT (INPATIENT)
Dept: DIALYSIS | Facility: HOSPITAL | Age: 88
DRG: 871 | End: 2023-01-01
Payer: MEDICARE

## 2023-01-01 ENCOUNTER — APPOINTMENT (INPATIENT)
Dept: RADIOLOGY | Facility: HOSPITAL | Age: 88
DRG: 871 | End: 2023-01-01
Attending: RADIOLOGY
Payer: MEDICARE

## 2023-01-01 ENCOUNTER — APPOINTMENT (INPATIENT)
Dept: NON INVASIVE DIAGNOSTICS | Facility: HOSPITAL | Age: 88
DRG: 871 | End: 2023-01-01
Payer: MEDICARE

## 2023-01-01 ENCOUNTER — TELEPHONE (OUTPATIENT)
Dept: NEUROSURGERY | Facility: CLINIC | Age: 88
End: 2023-01-01

## 2023-01-01 ENCOUNTER — APPOINTMENT (INPATIENT)
Dept: GASTROENTEROLOGY | Facility: HOSPITAL | Age: 88
DRG: 871 | End: 2023-01-01
Payer: MEDICARE

## 2023-01-01 ENCOUNTER — APPOINTMENT (INPATIENT)
Dept: RADIOLOGY | Facility: HOSPITAL | Age: 88
DRG: 871 | End: 2023-01-01
Attending: FAMILY MEDICINE
Payer: MEDICARE

## 2023-01-01 ENCOUNTER — APPOINTMENT (EMERGENCY)
Dept: RADIOLOGY | Facility: HOSPITAL | Age: 88
DRG: 871 | End: 2023-01-01
Payer: MEDICARE

## 2023-01-01 ENCOUNTER — APPOINTMENT (INPATIENT)
Dept: RADIOLOGY | Facility: HOSPITAL | Age: 88
DRG: 871 | End: 2023-01-01
Attending: INTERNAL MEDICINE
Payer: MEDICARE

## 2023-01-01 ENCOUNTER — APPOINTMENT (INPATIENT)
Dept: RADIOLOGY | Facility: HOSPITAL | Age: 88
DRG: 871 | End: 2023-01-01
Attending: STUDENT IN AN ORGANIZED HEALTH CARE EDUCATION/TRAINING PROGRAM
Payer: MEDICARE

## 2023-01-01 ENCOUNTER — HOSPITAL ENCOUNTER (INPATIENT)
Facility: HOSPITAL | Age: 88
LOS: 16 days | DRG: 871 | End: 2023-06-30
Attending: EMERGENCY MEDICINE | Admitting: EMERGENCY MEDICINE
Payer: MEDICARE

## 2023-01-01 VITALS
RESPIRATION RATE: 19 BRPM | HEIGHT: 73 IN | BODY MASS INDEX: 39.44 KG/M2 | SYSTOLIC BLOOD PRESSURE: 109 MMHG | DIASTOLIC BLOOD PRESSURE: 59 MMHG | OXYGEN SATURATION: 94 % | HEART RATE: 97 BPM | WEIGHT: 297.62 LBS | TEMPERATURE: 103.1 F

## 2023-01-01 DIAGNOSIS — E16.2 HYPOGLYCEMIA: ICD-10-CM

## 2023-01-01 DIAGNOSIS — M10.9 ACUTE GOUT OF ANKLE, UNSPECIFIED CAUSE, UNSPECIFIED LATERALITY: ICD-10-CM

## 2023-01-01 DIAGNOSIS — R10.9 ABDOMINAL PAIN: ICD-10-CM

## 2023-01-01 DIAGNOSIS — R00.1 BRADYCARDIA: ICD-10-CM

## 2023-01-01 DIAGNOSIS — E87.5 HYPERKALEMIA: ICD-10-CM

## 2023-01-01 DIAGNOSIS — S32.010A CLOSED WEDGE COMPRESSION FRACTURE OF L1 VERTEBRA, INITIAL ENCOUNTER (HCC): ICD-10-CM

## 2023-01-01 DIAGNOSIS — E87.20 METABOLIC ACIDOSIS: ICD-10-CM

## 2023-01-01 DIAGNOSIS — R65.21 SEPTIC SHOCK (HCC): ICD-10-CM

## 2023-01-01 DIAGNOSIS — K92.1 MELENA: ICD-10-CM

## 2023-01-01 DIAGNOSIS — M54.50 ACUTE LOW BACK PAIN: ICD-10-CM

## 2023-01-01 DIAGNOSIS — A41.9 SEPTIC SHOCK (HCC): ICD-10-CM

## 2023-01-01 DIAGNOSIS — R53.1 GENERALIZED WEAKNESS: Primary | ICD-10-CM

## 2023-01-01 DIAGNOSIS — Z51.5 COMFORT MEASURES ONLY STATUS: ICD-10-CM

## 2023-01-01 DIAGNOSIS — N18.30 ACUTE RENAL FAILURE SUPERIMPOSED ON STAGE 3 CHRONIC KIDNEY DISEASE, UNSPECIFIED ACUTE RENAL FAILURE TYPE, UNSPECIFIED WHETHER STAGE 3A OR 3B CKD (HCC): ICD-10-CM

## 2023-01-01 DIAGNOSIS — F03.90 DEMENTIA (HCC): ICD-10-CM

## 2023-01-01 DIAGNOSIS — N39.0 UTI (URINARY TRACT INFECTION): ICD-10-CM

## 2023-01-01 DIAGNOSIS — Z95.810 AICD (AUTOMATIC CARDIOVERTER/DEFIBRILLATOR) PRESENT: ICD-10-CM

## 2023-01-01 DIAGNOSIS — N17.9 AKI (ACUTE KIDNEY INJURY) (HCC): ICD-10-CM

## 2023-01-01 DIAGNOSIS — R13.10 DYSPHAGIA: ICD-10-CM

## 2023-01-01 DIAGNOSIS — N17.9 ACUTE RENAL FAILURE SUPERIMPOSED ON STAGE 3 CHRONIC KIDNEY DISEASE, UNSPECIFIED ACUTE RENAL FAILURE TYPE, UNSPECIFIED WHETHER STAGE 3A OR 3B CKD (HCC): ICD-10-CM

## 2023-01-01 DIAGNOSIS — R41.0 DELIRIUM: ICD-10-CM

## 2023-01-01 LAB
2HR DELTA HS TROPONIN: 10 NG/L
2HR DELTA HS TROPONIN: 2 NG/L
2HR DELTA HS TROPONIN: 4 NG/L
4HR DELTA HS TROPONIN: -2 NG/L
4HR DELTA HS TROPONIN: 16 NG/L
ABO GROUP BLD BPU: NORMAL
ABO GROUP BLD: NORMAL
ABO GROUP BLD: NORMAL
ACANTHOCYTES BLD QL SMEAR: PRESENT
ALBUMIN SERPL BCP-MCNC: 1.8 G/DL (ref 3.5–5)
ALBUMIN SERPL BCP-MCNC: 2.1 G/DL (ref 3.5–5)
ALBUMIN SERPL BCP-MCNC: 2.6 G/DL (ref 3.5–5)
ALBUMIN SERPL BCP-MCNC: 2.8 G/DL (ref 3.5–5)
ALBUMIN SERPL BCP-MCNC: 2.8 G/DL (ref 3.5–5)
ALBUMIN SERPL BCP-MCNC: 3.1 G/DL (ref 3.5–5)
ALP SERPL-CCNC: 101 U/L (ref 46–116)
ALP SERPL-CCNC: 105 U/L (ref 46–116)
ALP SERPL-CCNC: 79 U/L (ref 46–116)
ALP SERPL-CCNC: 92 U/L (ref 46–116)
ALT SERPL W P-5'-P-CCNC: 19 U/L (ref 12–78)
ALT SERPL W P-5'-P-CCNC: 20 U/L (ref 12–78)
ALT SERPL W P-5'-P-CCNC: 23 U/L (ref 12–78)
ALT SERPL W P-5'-P-CCNC: 30 U/L (ref 12–78)
ANION GAP SERPL CALCULATED.3IONS-SCNC: 0 MMOL/L
ANION GAP SERPL CALCULATED.3IONS-SCNC: 0 MMOL/L
ANION GAP SERPL CALCULATED.3IONS-SCNC: 0 MMOL/L (ref 4–13)
ANION GAP SERPL CALCULATED.3IONS-SCNC: 0 MMOL/L (ref 4–13)
ANION GAP SERPL CALCULATED.3IONS-SCNC: 1 MMOL/L
ANION GAP SERPL CALCULATED.3IONS-SCNC: 10 MMOL/L (ref 4–13)
ANION GAP SERPL CALCULATED.3IONS-SCNC: 10 MMOL/L (ref 4–13)
ANION GAP SERPL CALCULATED.3IONS-SCNC: 12 MMOL/L (ref 4–13)
ANION GAP SERPL CALCULATED.3IONS-SCNC: 14 MMOL/L (ref 4–13)
ANION GAP SERPL CALCULATED.3IONS-SCNC: 14 MMOL/L (ref 4–13)
ANION GAP SERPL CALCULATED.3IONS-SCNC: 16 MMOL/L (ref 4–13)
ANION GAP SERPL CALCULATED.3IONS-SCNC: 16 MMOL/L (ref 4–13)
ANION GAP SERPL CALCULATED.3IONS-SCNC: 17 MMOL/L (ref 4–13)
ANION GAP SERPL CALCULATED.3IONS-SCNC: 18 MMOL/L (ref 4–13)
ANION GAP SERPL CALCULATED.3IONS-SCNC: 2 MMOL/L
ANION GAP SERPL CALCULATED.3IONS-SCNC: 2 MMOL/L (ref 4–13)
ANION GAP SERPL CALCULATED.3IONS-SCNC: 3 MMOL/L
ANION GAP SERPL CALCULATED.3IONS-SCNC: 3 MMOL/L (ref 4–13)
ANION GAP SERPL CALCULATED.3IONS-SCNC: 4 MMOL/L
ANION GAP SERPL CALCULATED.3IONS-SCNC: 4 MMOL/L (ref 4–13)
ANION GAP SERPL CALCULATED.3IONS-SCNC: 5 MMOL/L
ANION GAP SERPL CALCULATED.3IONS-SCNC: 5 MMOL/L
ANION GAP SERPL CALCULATED.3IONS-SCNC: 5 MMOL/L (ref 4–13)
ANISOCYTOSIS BLD QL SMEAR: PRESENT
APTT PPP: 102 SECONDS (ref 23–37)
APTT PPP: 111 SECONDS (ref 23–37)
APTT PPP: 154 SECONDS (ref 23–37)
APTT PPP: 43 SECONDS (ref 23–37)
APTT PPP: 45 SECONDS (ref 23–37)
APTT PPP: 74 SECONDS (ref 23–37)
APTT PPP: 77 SECONDS (ref 23–37)
AST SERPL W P-5'-P-CCNC: 28 U/L (ref 5–45)
AST SERPL W P-5'-P-CCNC: 33 U/L (ref 5–45)
AST SERPL W P-5'-P-CCNC: 39 U/L (ref 5–45)
AST SERPL W P-5'-P-CCNC: 58 U/L (ref 5–45)
ATRIAL RATE: 100 BPM
ATRIAL RATE: 52 BPM
ATRIAL RATE: 64 BPM
ATRIAL RATE: 65 BPM
ATRIAL RATE: 65 BPM
ATRIAL RATE: 66 BPM
ATRIAL RATE: 66 BPM
ATRIAL RATE: 68 BPM
ATRIAL RATE: 72 BPM
ATRIAL RATE: 75 BPM
ATRIAL RATE: 87 BPM
ATRIAL RATE: 97 BPM
ATRIAL RATE: 97 BPM
BACTERIA BLD CULT: NORMAL
BACTERIA UR CULT: ABNORMAL
BACTERIA UR QL AUTO: ABNORMAL /HPF
BACTERIA UR QL AUTO: ABNORMAL /HPF
BASE EX.OXY STD BLDV CALC-SCNC: 94.6 % (ref 60–80)
BASE EXCESS BLDA CALC-SCNC: -15 MMOL/L (ref -2–3)
BASE EXCESS BLDA CALC-SCNC: -16.8 MMOL/L
BASE EXCESS BLDA CALC-SCNC: -6.2 MMOL/L
BASE EXCESS BLDA CALC-SCNC: -7 MMOL/L (ref -2–3)
BASE EXCESS BLDA CALC-SCNC: -7.3 MMOL/L
BASE EXCESS BLDV CALC-SCNC: -18.8 MMOL/L
BASOPHILS # BLD AUTO: 0.01 THOUSANDS/ÂΜL (ref 0–0.1)
BASOPHILS # BLD AUTO: 0.02 THOUSANDS/ÂΜL (ref 0–0.1)
BASOPHILS # BLD AUTO: 0.03 THOUSANDS/ÂΜL (ref 0–0.1)
BASOPHILS # BLD AUTO: 0.07 THOUSANDS/ÂΜL (ref 0–0.1)
BASOPHILS # BLD MANUAL: 0 THOUSAND/UL (ref 0–0.1)
BASOPHILS NFR BLD AUTO: 0 % (ref 0–1)
BASOPHILS NFR BLD AUTO: 1 % (ref 0–1)
BASOPHILS NFR MAR MANUAL: 0 % (ref 0–1)
BILIRUB DIRECT SERPL-MCNC: 0.57 MG/DL (ref 0–0.2)
BILIRUB SERPL-MCNC: 0.9 MG/DL (ref 0.2–1)
BILIRUB SERPL-MCNC: 1.06 MG/DL (ref 0.2–1)
BILIRUB SERPL-MCNC: 1.45 MG/DL (ref 0.2–1)
BILIRUB SERPL-MCNC: 1.86 MG/DL (ref 0.2–1)
BILIRUB UR QL STRIP: NEGATIVE
BILIRUB UR QL STRIP: NEGATIVE
BLD GP AB SCN SERPL QL: NEGATIVE
BLD GP AB SCN SERPL QL: NEGATIVE
BLD SMEAR INTERP: NORMAL
BPU ID: NORMAL
BUN SERPL-MCNC: 12 MG/DL (ref 5–25)
BUN SERPL-MCNC: 13 MG/DL (ref 5–25)
BUN SERPL-MCNC: 14 MG/DL (ref 5–25)
BUN SERPL-MCNC: 14 MG/DL (ref 5–25)
BUN SERPL-MCNC: 15 MG/DL (ref 5–25)
BUN SERPL-MCNC: 15 MG/DL (ref 5–25)
BUN SERPL-MCNC: 17 MG/DL (ref 5–25)
BUN SERPL-MCNC: 18 MG/DL (ref 5–25)
BUN SERPL-MCNC: 18 MG/DL (ref 5–25)
BUN SERPL-MCNC: 19 MG/DL (ref 5–25)
BUN SERPL-MCNC: 20 MG/DL (ref 5–25)
BUN SERPL-MCNC: 21 MG/DL (ref 5–25)
BUN SERPL-MCNC: 23 MG/DL (ref 5–25)
BUN SERPL-MCNC: 24 MG/DL (ref 5–25)
BUN SERPL-MCNC: 25 MG/DL (ref 5–25)
BUN SERPL-MCNC: 27 MG/DL (ref 5–25)
BUN SERPL-MCNC: 30 MG/DL (ref 5–25)
BUN SERPL-MCNC: 33 MG/DL (ref 5–25)
BUN SERPL-MCNC: 34 MG/DL (ref 5–25)
BUN SERPL-MCNC: 34 MG/DL (ref 5–25)
BUN SERPL-MCNC: 36 MG/DL (ref 5–25)
BUN SERPL-MCNC: 40 MG/DL (ref 5–25)
BUN SERPL-MCNC: 40 MG/DL (ref 5–25)
BUN SERPL-MCNC: 44 MG/DL (ref 5–25)
BUN SERPL-MCNC: 44 MG/DL (ref 5–25)
BUN SERPL-MCNC: 48 MG/DL (ref 5–25)
BUN SERPL-MCNC: 51 MG/DL (ref 5–25)
BUN SERPL-MCNC: 54 MG/DL (ref 5–25)
BUN SERPL-MCNC: 60 MG/DL (ref 5–25)
BUN SERPL-MCNC: 63 MG/DL (ref 5–25)
BUN SERPL-MCNC: 79 MG/DL (ref 5–25)
BUN SERPL-MCNC: 80 MG/DL (ref 5–25)
BUN SERPL-MCNC: 81 MG/DL (ref 5–25)
C PEPTIDE SERPL-MCNC: 2.8 NG/ML (ref 1.1–4.4)
CA-I BLD-SCNC: 0.93 MMOL/L (ref 1.12–1.32)
CA-I BLD-SCNC: 1.05 MMOL/L (ref 1.12–1.32)
CA-I BLD-SCNC: 1.08 MMOL/L (ref 1.12–1.32)
CA-I BLD-SCNC: 1.08 MMOL/L (ref 1.12–1.32)
CA-I BLD-SCNC: 1.09 MMOL/L (ref 1.12–1.32)
CA-I BLD-SCNC: 1.1 MMOL/L (ref 1.12–1.32)
CA-I BLD-SCNC: 1.11 MMOL/L (ref 1.12–1.32)
CA-I BLD-SCNC: 1.13 MMOL/L (ref 1.12–1.32)
CA-I BLD-SCNC: 1.13 MMOL/L (ref 1.12–1.32)
CA-I BLD-SCNC: 1.15 MMOL/L (ref 1.12–1.32)
CA-I BLD-SCNC: 1.16 MMOL/L (ref 1.12–1.32)
CA-I BLD-SCNC: 1.17 MMOL/L (ref 1.12–1.32)
CA-I BLD-SCNC: 1.17 MMOL/L (ref 1.12–1.32)
CA-I BLD-SCNC: 1.18 MMOL/L (ref 1.12–1.32)
CA-I BLD-SCNC: 1.2 MMOL/L (ref 1.12–1.32)
CA-I BLD-SCNC: 1.42 MMOL/L (ref 1.12–1.32)
CALCIUM ALBUM COR SERPL-MCNC: 10.1 MG/DL (ref 8.3–10.1)
CALCIUM ALBUM COR SERPL-MCNC: 10.5 MG/DL (ref 8.3–10.1)
CALCIUM ALBUM COR SERPL-MCNC: 9.4 MG/DL (ref 8.3–10.1)
CALCIUM ALBUM COR SERPL-MCNC: 9.4 MG/DL (ref 8.3–10.1)
CALCIUM ALBUM COR SERPL-MCNC: 9.9 MG/DL (ref 8.3–10.1)
CALCIUM SERPL-MCNC: 5.4 MG/DL (ref 8.3–10.1)
CALCIUM SERPL-MCNC: 7.6 MG/DL (ref 8.3–10.1)
CALCIUM SERPL-MCNC: 7.9 MG/DL (ref 8.3–10.1)
CALCIUM SERPL-MCNC: 8.1 MG/DL (ref 8.3–10.1)
CALCIUM SERPL-MCNC: 8.1 MG/DL (ref 8.3–10.1)
CALCIUM SERPL-MCNC: 8.2 MG/DL (ref 8.3–10.1)
CALCIUM SERPL-MCNC: 8.3 MG/DL (ref 8.3–10.1)
CALCIUM SERPL-MCNC: 8.3 MG/DL (ref 8.3–10.1)
CALCIUM SERPL-MCNC: 8.4 MG/DL (ref 8.3–10.1)
CALCIUM SERPL-MCNC: 8.5 MG/DL (ref 8.3–10.1)
CALCIUM SERPL-MCNC: 8.6 MG/DL (ref 8.3–10.1)
CALCIUM SERPL-MCNC: 8.6 MG/DL (ref 8.3–10.1)
CALCIUM SERPL-MCNC: 8.7 MG/DL (ref 8.3–10.1)
CALCIUM SERPL-MCNC: 8.8 MG/DL (ref 8.3–10.1)
CALCIUM SERPL-MCNC: 8.9 MG/DL (ref 8.3–10.1)
CALCIUM SERPL-MCNC: 9 MG/DL (ref 8.3–10.1)
CALCIUM SERPL-MCNC: 9.1 MG/DL (ref 8.3–10.1)
CALCIUM SERPL-MCNC: 9.2 MG/DL (ref 8.3–10.1)
CALCIUM SERPL-MCNC: 9.4 MG/DL (ref 8.3–10.1)
CALCIUM SERPL-MCNC: 9.5 MG/DL (ref 8.3–10.1)
CALCIUM SERPL-MCNC: 9.6 MG/DL (ref 8.3–10.1)
CARDIAC TROPONIN I PNL SERPL HS: 12 NG/L
CARDIAC TROPONIN I PNL SERPL HS: 14 NG/L
CARDIAC TROPONIN I PNL SERPL HS: 16 NG/L
CARDIAC TROPONIN I PNL SERPL HS: 19 NG/L
CARDIAC TROPONIN I PNL SERPL HS: 23 NG/L
CARDIAC TROPONIN I PNL SERPL HS: 64 NG/L
CARDIAC TROPONIN I PNL SERPL HS: 74 NG/L
CARDIAC TROPONIN I PNL SERPL HS: 80 NG/L
CFFFLEV: 498.2 MG/DL (ref 278–581)
CFFMA (FUNCTIONAL FIBRINOGEN MAX AMPLITUDE): 27.3 MM (ref 15–32)
CHLORIDE SERPL-SCNC: 100 MMOL/L (ref 96–108)
CHLORIDE SERPL-SCNC: 101 MMOL/L (ref 96–108)
CHLORIDE SERPL-SCNC: 102 MMOL/L (ref 96–108)
CHLORIDE SERPL-SCNC: 103 MMOL/L (ref 96–108)
CHLORIDE SERPL-SCNC: 104 MMOL/L (ref 96–108)
CHLORIDE SERPL-SCNC: 105 MMOL/L (ref 96–108)
CHLORIDE SERPL-SCNC: 105 MMOL/L (ref 96–108)
CHLORIDE SERPL-SCNC: 106 MMOL/L (ref 96–108)
CHLORIDE SERPL-SCNC: 107 MMOL/L (ref 96–108)
CHLORIDE SERPL-SCNC: 107 MMOL/L (ref 96–108)
CHLORIDE SERPL-SCNC: 108 MMOL/L (ref 96–108)
CHLORIDE SERPL-SCNC: 109 MMOL/L (ref 96–108)
CHLORIDE SERPL-SCNC: 109 MMOL/L (ref 96–108)
CHLORIDE SERPL-SCNC: 110 MMOL/L (ref 96–108)
CHLORIDE SERPL-SCNC: 111 MMOL/L (ref 96–108)
CHLORIDE SERPL-SCNC: 111 MMOL/L (ref 96–108)
CHLORIDE SERPL-SCNC: 112 MMOL/L (ref 96–108)
CHLORIDE SERPL-SCNC: 113 MMOL/L (ref 96–108)
CHLORIDE SERPL-SCNC: 113 MMOL/L (ref 96–108)
CHLORIDE SERPL-SCNC: 125 MMOL/L (ref 96–108)
CHLORIDE SERPL-SCNC: 98 MMOL/L (ref 96–108)
CK SERPL-CCNC: 44 U/L (ref 39–308)
CKA(ANGLE): 39 DEG (ref 63–78)
CKHR(HEPARINASE REACTION TIME): 10 MIN (ref 4.3–8.3)
CKK(CLOT KINETICS): >5 MIN (ref 0.8–2.1)
CKMA(MAX AMPLITUDE): <40 MM (ref 52–69)
CKR(REACTION TIME): >17 MIN (ref 4.6–9.1)
CLARITY UR: ABNORMAL
CLARITY UR: CLEAR
CO2 SERPL-SCNC: 10 MMOL/L (ref 21–32)
CO2 SERPL-SCNC: 11 MMOL/L (ref 21–32)
CO2 SERPL-SCNC: 12 MMOL/L (ref 21–32)
CO2 SERPL-SCNC: 15 MMOL/L (ref 21–32)
CO2 SERPL-SCNC: 15 MMOL/L (ref 21–32)
CO2 SERPL-SCNC: 17 MMOL/L (ref 21–32)
CO2 SERPL-SCNC: 17 MMOL/L (ref 21–32)
CO2 SERPL-SCNC: 18 MMOL/L (ref 21–32)
CO2 SERPL-SCNC: 19 MMOL/L (ref 21–32)
CO2 SERPL-SCNC: 20 MMOL/L (ref 21–32)
CO2 SERPL-SCNC: 20 MMOL/L (ref 21–32)
CO2 SERPL-SCNC: 21 MMOL/L (ref 21–32)
CO2 SERPL-SCNC: 26 MMOL/L (ref 21–32)
CO2 SERPL-SCNC: 27 MMOL/L (ref 21–32)
CO2 SERPL-SCNC: 28 MMOL/L (ref 21–32)
CO2 SERPL-SCNC: 29 MMOL/L (ref 21–32)
CO2 SERPL-SCNC: 30 MMOL/L (ref 21–32)
CO2 SERPL-SCNC: 31 MMOL/L (ref 21–32)
CO2 SERPL-SCNC: 32 MMOL/L (ref 21–32)
CO2 SERPL-SCNC: 6 MMOL/L (ref 21–32)
COLOR UR: YELLOW
COLOR UR: YELLOW
CREAT SERPL-MCNC: 1.05 MG/DL (ref 0.6–1.3)
CREAT SERPL-MCNC: 1.18 MG/DL (ref 0.6–1.3)
CREAT SERPL-MCNC: 1.31 MG/DL (ref 0.6–1.3)
CREAT SERPL-MCNC: 1.34 MG/DL (ref 0.6–1.3)
CREAT SERPL-MCNC: 1.36 MG/DL (ref 0.6–1.3)
CREAT SERPL-MCNC: 1.45 MG/DL (ref 0.6–1.3)
CREAT SERPL-MCNC: 1.45 MG/DL (ref 0.6–1.3)
CREAT SERPL-MCNC: 1.53 MG/DL (ref 0.6–1.3)
CREAT SERPL-MCNC: 1.56 MG/DL (ref 0.6–1.3)
CREAT SERPL-MCNC: 1.61 MG/DL (ref 0.6–1.3)
CREAT SERPL-MCNC: 1.61 MG/DL (ref 0.6–1.3)
CREAT SERPL-MCNC: 1.62 MG/DL (ref 0.6–1.3)
CREAT SERPL-MCNC: 1.63 MG/DL (ref 0.6–1.3)
CREAT SERPL-MCNC: 1.72 MG/DL (ref 0.6–1.3)
CREAT SERPL-MCNC: 1.74 MG/DL (ref 0.6–1.3)
CREAT SERPL-MCNC: 1.83 MG/DL (ref 0.6–1.3)
CREAT SERPL-MCNC: 1.83 MG/DL (ref 0.6–1.3)
CREAT SERPL-MCNC: 1.87 MG/DL (ref 0.6–1.3)
CREAT SERPL-MCNC: 1.9 MG/DL (ref 0.6–1.3)
CREAT SERPL-MCNC: 1.9 MG/DL (ref 0.6–1.3)
CREAT SERPL-MCNC: 1.98 MG/DL (ref 0.6–1.3)
CREAT SERPL-MCNC: 1.99 MG/DL (ref 0.6–1.3)
CREAT SERPL-MCNC: 2.22 MG/DL (ref 0.6–1.3)
CREAT SERPL-MCNC: 2.68 MG/DL (ref 0.6–1.3)
CREAT SERPL-MCNC: 3.07 MG/DL (ref 0.6–1.3)
CREAT SERPL-MCNC: 3.35 MG/DL (ref 0.6–1.3)
CREAT SERPL-MCNC: 3.46 MG/DL (ref 0.6–1.3)
CREAT SERPL-MCNC: 3.72 MG/DL (ref 0.6–1.3)
CREAT SERPL-MCNC: 3.89 MG/DL (ref 0.6–1.3)
CREAT SERPL-MCNC: 4.33 MG/DL (ref 0.6–1.3)
CREAT SERPL-MCNC: 4.97 MG/DL (ref 0.6–1.3)
CREAT SERPL-MCNC: 5.55 MG/DL (ref 0.6–1.3)
CREAT SERPL-MCNC: 6.06 MG/DL (ref 0.6–1.3)
CREAT SERPL-MCNC: 6.49 MG/DL (ref 0.6–1.3)
CREAT SERPL-MCNC: 8.19 MG/DL (ref 0.6–1.3)
CREAT SERPL-MCNC: 8.58 MG/DL (ref 0.6–1.3)
CREAT SERPL-MCNC: 8.7 MG/DL (ref 0.6–1.3)
CROSSMATCH: NORMAL
CROSSMATCH: NORMAL
CRTMA(RAPIDTEG MAX AMPLITUDE): 63.9 MM (ref 52–70)
DOP CALC MV VTI: 63.8 CM
EOSINOPHIL # BLD AUTO: 0 THOUSAND/ÂΜL (ref 0–0.61)
EOSINOPHIL # BLD AUTO: 0.01 THOUSAND/ÂΜL (ref 0–0.61)
EOSINOPHIL # BLD AUTO: 0.02 THOUSAND/ÂΜL (ref 0–0.61)
EOSINOPHIL # BLD AUTO: 0.02 THOUSAND/ÂΜL (ref 0–0.61)
EOSINOPHIL # BLD AUTO: 0.04 THOUSAND/ÂΜL (ref 0–0.61)
EOSINOPHIL # BLD AUTO: 0.04 THOUSAND/ÂΜL (ref 0–0.61)
EOSINOPHIL # BLD AUTO: 0.06 THOUSAND/ÂΜL (ref 0–0.61)
EOSINOPHIL # BLD AUTO: 0.24 THOUSAND/ÂΜL (ref 0–0.61)
EOSINOPHIL # BLD AUTO: 0.38 THOUSAND/ÂΜL (ref 0–0.61)
EOSINOPHIL # BLD MANUAL: 0 THOUSAND/UL (ref 0–0.4)
EOSINOPHIL NFR BLD AUTO: 0 % (ref 0–6)
EOSINOPHIL NFR BLD AUTO: 1 % (ref 0–6)
EOSINOPHIL NFR BLD AUTO: 2 % (ref 0–6)
EOSINOPHIL NFR BLD AUTO: 3 % (ref 0–6)
EOSINOPHIL NFR BLD MANUAL: 0 % (ref 0–6)
ERYTHROCYTE [DISTWIDTH] IN BLOOD BY AUTOMATED COUNT: 18.9 % (ref 11.6–15.1)
ERYTHROCYTE [DISTWIDTH] IN BLOOD BY AUTOMATED COUNT: 19.1 % (ref 11.6–15.1)
ERYTHROCYTE [DISTWIDTH] IN BLOOD BY AUTOMATED COUNT: 19.2 % (ref 11.6–15.1)
ERYTHROCYTE [DISTWIDTH] IN BLOOD BY AUTOMATED COUNT: 19.3 % (ref 11.6–15.1)
ERYTHROCYTE [DISTWIDTH] IN BLOOD BY AUTOMATED COUNT: 19.3 % (ref 11.6–15.1)
ERYTHROCYTE [DISTWIDTH] IN BLOOD BY AUTOMATED COUNT: 19.4 % (ref 11.6–15.1)
ERYTHROCYTE [DISTWIDTH] IN BLOOD BY AUTOMATED COUNT: 19.5 % (ref 11.6–15.1)
ERYTHROCYTE [DISTWIDTH] IN BLOOD BY AUTOMATED COUNT: 19.6 % (ref 11.6–15.1)
ERYTHROCYTE [DISTWIDTH] IN BLOOD BY AUTOMATED COUNT: 19.6 % (ref 11.6–15.1)
ERYTHROCYTE [DISTWIDTH] IN BLOOD BY AUTOMATED COUNT: 20.1 % (ref 11.6–15.1)
ERYTHROCYTE [DISTWIDTH] IN BLOOD BY AUTOMATED COUNT: 20.5 % (ref 11.6–15.1)
ERYTHROCYTE [DISTWIDTH] IN BLOOD BY AUTOMATED COUNT: 20.9 % (ref 11.6–15.1)
ERYTHROCYTE [DISTWIDTH] IN BLOOD BY AUTOMATED COUNT: 21 % (ref 11.6–15.1)
ERYTHROCYTE [DISTWIDTH] IN BLOOD BY AUTOMATED COUNT: 21.1 % (ref 11.6–15.1)
ERYTHROCYTE [DISTWIDTH] IN BLOOD BY AUTOMATED COUNT: 21.2 % (ref 11.6–15.1)
ERYTHROCYTE [DISTWIDTH] IN BLOOD BY AUTOMATED COUNT: 21.5 % (ref 11.6–15.1)
ERYTHROCYTE [DISTWIDTH] IN BLOOD BY AUTOMATED COUNT: 21.6 % (ref 11.6–15.1)
EST. AVERAGE GLUCOSE BLD GHB EST-MCNC: 97 MG/DL
FERRITIN SERPL-MCNC: 304 NG/ML (ref 24–336)
FIBRINOGEN PPP-MCNC: 234 MG/DL (ref 227–495)
FOLATE SERPL-MCNC: 10.5 NG/ML
FRACTIONAL SHORTENING: 46 % (ref 28–44)
GFR SERPL CREATININE-BSD FRML MDRD: 11 ML/MIN/1.73SQ M
GFR SERPL CREATININE-BSD FRML MDRD: 13 ML/MIN/1.73SQ M
GFR SERPL CREATININE-BSD FRML MDRD: 13 ML/MIN/1.73SQ M
GFR SERPL CREATININE-BSD FRML MDRD: 15 ML/MIN/1.73SQ M
GFR SERPL CREATININE-BSD FRML MDRD: 15 ML/MIN/1.73SQ M
GFR SERPL CREATININE-BSD FRML MDRD: 17 ML/MIN/1.73SQ M
GFR SERPL CREATININE-BSD FRML MDRD: 20 ML/MIN/1.73SQ M
GFR SERPL CREATININE-BSD FRML MDRD: 25 ML/MIN/1.73SQ M
GFR SERPL CREATININE-BSD FRML MDRD: 29 ML/MIN/1.73SQ M
GFR SERPL CREATININE-BSD FRML MDRD: 29 ML/MIN/1.73SQ M
GFR SERPL CREATININE-BSD FRML MDRD: 31 ML/MIN/1.73SQ M
GFR SERPL CREATININE-BSD FRML MDRD: 32 ML/MIN/1.73SQ M
GFR SERPL CREATININE-BSD FRML MDRD: 32 ML/MIN/1.73SQ M
GFR SERPL CREATININE-BSD FRML MDRD: 34 ML/MIN/1.73SQ M
GFR SERPL CREATININE-BSD FRML MDRD: 34 ML/MIN/1.73SQ M
GFR SERPL CREATININE-BSD FRML MDRD: 37 ML/MIN/1.73SQ M
GFR SERPL CREATININE-BSD FRML MDRD: 39 ML/MIN/1.73SQ M
GFR SERPL CREATININE-BSD FRML MDRD: 4 ML/MIN/1.73SQ M
GFR SERPL CREATININE-BSD FRML MDRD: 40 ML/MIN/1.73SQ M
GFR SERPL CREATININE-BSD FRML MDRD: 42 ML/MIN/1.73SQ M
GFR SERPL CREATININE-BSD FRML MDRD: 42 ML/MIN/1.73SQ M
GFR SERPL CREATININE-BSD FRML MDRD: 46 ML/MIN/1.73SQ M
GFR SERPL CREATININE-BSD FRML MDRD: 47 ML/MIN/1.73SQ M
GFR SERPL CREATININE-BSD FRML MDRD: 48 ML/MIN/1.73SQ M
GFR SERPL CREATININE-BSD FRML MDRD: 5 ML/MIN/1.73SQ M
GFR SERPL CREATININE-BSD FRML MDRD: 5 ML/MIN/1.73SQ M
GFR SERPL CREATININE-BSD FRML MDRD: 55 ML/MIN/1.73SQ M
GFR SERPL CREATININE-BSD FRML MDRD: 63 ML/MIN/1.73SQ M
GFR SERPL CREATININE-BSD FRML MDRD: 7 ML/MIN/1.73SQ M
GFR SERPL CREATININE-BSD FRML MDRD: 7 ML/MIN/1.73SQ M
GFR SERPL CREATININE-BSD FRML MDRD: 8 ML/MIN/1.73SQ M
GFR SERPL CREATININE-BSD FRML MDRD: 9 ML/MIN/1.73SQ M
GLUCOSE SERPL-MCNC: 102 MG/DL (ref 65–140)
GLUCOSE SERPL-MCNC: 106 MG/DL (ref 65–140)
GLUCOSE SERPL-MCNC: 108 MG/DL (ref 65–140)
GLUCOSE SERPL-MCNC: 110 MG/DL (ref 65–140)
GLUCOSE SERPL-MCNC: 112 MG/DL (ref 65–140)
GLUCOSE SERPL-MCNC: 114 MG/DL (ref 65–140)
GLUCOSE SERPL-MCNC: 114 MG/DL (ref 65–140)
GLUCOSE SERPL-MCNC: 115 MG/DL (ref 65–140)
GLUCOSE SERPL-MCNC: 115 MG/DL (ref 65–140)
GLUCOSE SERPL-MCNC: 116 MG/DL (ref 65–140)
GLUCOSE SERPL-MCNC: 117 MG/DL (ref 65–140)
GLUCOSE SERPL-MCNC: 118 MG/DL (ref 65–140)
GLUCOSE SERPL-MCNC: 119 MG/DL (ref 65–140)
GLUCOSE SERPL-MCNC: 120 MG/DL (ref 65–140)
GLUCOSE SERPL-MCNC: 121 MG/DL (ref 65–140)
GLUCOSE SERPL-MCNC: 121 MG/DL (ref 65–140)
GLUCOSE SERPL-MCNC: 122 MG/DL (ref 65–140)
GLUCOSE SERPL-MCNC: 123 MG/DL (ref 65–140)
GLUCOSE SERPL-MCNC: 123 MG/DL (ref 65–140)
GLUCOSE SERPL-MCNC: 124 MG/DL (ref 65–140)
GLUCOSE SERPL-MCNC: 124 MG/DL (ref 65–140)
GLUCOSE SERPL-MCNC: 125 MG/DL (ref 65–140)
GLUCOSE SERPL-MCNC: 125 MG/DL (ref 65–140)
GLUCOSE SERPL-MCNC: 126 MG/DL (ref 65–140)
GLUCOSE SERPL-MCNC: 131 MG/DL (ref 65–140)
GLUCOSE SERPL-MCNC: 135 MG/DL (ref 65–140)
GLUCOSE SERPL-MCNC: 137 MG/DL (ref 65–140)
GLUCOSE SERPL-MCNC: 139 MG/DL (ref 65–140)
GLUCOSE SERPL-MCNC: 139 MG/DL (ref 65–140)
GLUCOSE SERPL-MCNC: 140 MG/DL (ref 65–140)
GLUCOSE SERPL-MCNC: 141 MG/DL (ref 65–140)
GLUCOSE SERPL-MCNC: 142 MG/DL (ref 65–140)
GLUCOSE SERPL-MCNC: 143 MG/DL (ref 65–140)
GLUCOSE SERPL-MCNC: 144 MG/DL (ref 65–140)
GLUCOSE SERPL-MCNC: 144 MG/DL (ref 65–140)
GLUCOSE SERPL-MCNC: 145 MG/DL (ref 65–140)
GLUCOSE SERPL-MCNC: 147 MG/DL (ref 65–140)
GLUCOSE SERPL-MCNC: 148 MG/DL (ref 65–140)
GLUCOSE SERPL-MCNC: 148 MG/DL (ref 65–140)
GLUCOSE SERPL-MCNC: 149 MG/DL (ref 65–140)
GLUCOSE SERPL-MCNC: 151 MG/DL (ref 65–140)
GLUCOSE SERPL-MCNC: 152 MG/DL (ref 65–140)
GLUCOSE SERPL-MCNC: 152 MG/DL (ref 65–140)
GLUCOSE SERPL-MCNC: 155 MG/DL (ref 65–140)
GLUCOSE SERPL-MCNC: 156 MG/DL (ref 65–140)
GLUCOSE SERPL-MCNC: 157 MG/DL (ref 65–140)
GLUCOSE SERPL-MCNC: 158 MG/DL (ref 65–140)
GLUCOSE SERPL-MCNC: 159 MG/DL (ref 65–140)
GLUCOSE SERPL-MCNC: 161 MG/DL (ref 65–140)
GLUCOSE SERPL-MCNC: 161 MG/DL (ref 65–140)
GLUCOSE SERPL-MCNC: 162 MG/DL (ref 65–140)
GLUCOSE SERPL-MCNC: 162 MG/DL (ref 65–140)
GLUCOSE SERPL-MCNC: 163 MG/DL (ref 65–140)
GLUCOSE SERPL-MCNC: 164 MG/DL (ref 65–140)
GLUCOSE SERPL-MCNC: 164 MG/DL (ref 65–140)
GLUCOSE SERPL-MCNC: 168 MG/DL (ref 65–140)
GLUCOSE SERPL-MCNC: 168 MG/DL (ref 65–140)
GLUCOSE SERPL-MCNC: 171 MG/DL (ref 65–140)
GLUCOSE SERPL-MCNC: 172 MG/DL (ref 65–140)
GLUCOSE SERPL-MCNC: 172 MG/DL (ref 65–140)
GLUCOSE SERPL-MCNC: 174 MG/DL (ref 65–140)
GLUCOSE SERPL-MCNC: 175 MG/DL (ref 65–140)
GLUCOSE SERPL-MCNC: 179 MG/DL (ref 65–140)
GLUCOSE SERPL-MCNC: 182 MG/DL (ref 65–140)
GLUCOSE SERPL-MCNC: 183 MG/DL (ref 65–140)
GLUCOSE SERPL-MCNC: 183 MG/DL (ref 65–140)
GLUCOSE SERPL-MCNC: 184 MG/DL (ref 65–140)
GLUCOSE SERPL-MCNC: 185 MG/DL (ref 65–140)
GLUCOSE SERPL-MCNC: 187 MG/DL (ref 65–140)
GLUCOSE SERPL-MCNC: 191 MG/DL (ref 65–140)
GLUCOSE SERPL-MCNC: 194 MG/DL (ref 65–140)
GLUCOSE SERPL-MCNC: 194 MG/DL (ref 65–140)
GLUCOSE SERPL-MCNC: 196 MG/DL (ref 65–140)
GLUCOSE SERPL-MCNC: 197 MG/DL (ref 65–140)
GLUCOSE SERPL-MCNC: 201 MG/DL (ref 65–140)
GLUCOSE SERPL-MCNC: 201 MG/DL (ref 65–140)
GLUCOSE SERPL-MCNC: 202 MG/DL (ref 65–140)
GLUCOSE SERPL-MCNC: 203 MG/DL (ref 65–140)
GLUCOSE SERPL-MCNC: 204 MG/DL (ref 65–140)
GLUCOSE SERPL-MCNC: 207 MG/DL (ref 65–140)
GLUCOSE SERPL-MCNC: 207 MG/DL (ref 65–140)
GLUCOSE SERPL-MCNC: 21 MG/DL (ref 65–140)
GLUCOSE SERPL-MCNC: 211 MG/DL (ref 65–140)
GLUCOSE SERPL-MCNC: 222 MG/DL (ref 65–140)
GLUCOSE SERPL-MCNC: 225 MG/DL (ref 65–140)
GLUCOSE SERPL-MCNC: 227 MG/DL (ref 65–140)
GLUCOSE SERPL-MCNC: 228 MG/DL (ref 65–140)
GLUCOSE SERPL-MCNC: 230 MG/DL (ref 65–140)
GLUCOSE SERPL-MCNC: 231 MG/DL (ref 65–140)
GLUCOSE SERPL-MCNC: 233 MG/DL (ref 65–140)
GLUCOSE SERPL-MCNC: 236 MG/DL (ref 65–140)
GLUCOSE SERPL-MCNC: 236 MG/DL (ref 65–140)
GLUCOSE SERPL-MCNC: 252 MG/DL (ref 65–140)
GLUCOSE SERPL-MCNC: 255 MG/DL (ref 65–140)
GLUCOSE SERPL-MCNC: 256 MG/DL (ref 65–140)
GLUCOSE SERPL-MCNC: 43 MG/DL (ref 65–140)
GLUCOSE SERPL-MCNC: 47 MG/DL (ref 65–140)
GLUCOSE SERPL-MCNC: 56 MG/DL (ref 65–140)
GLUCOSE SERPL-MCNC: 57 MG/DL (ref 65–140)
GLUCOSE SERPL-MCNC: 60 MG/DL (ref 65–140)
GLUCOSE SERPL-MCNC: 75 MG/DL (ref 65–140)
GLUCOSE SERPL-MCNC: 83 MG/DL (ref 65–140)
GLUCOSE SERPL-MCNC: 87 MG/DL (ref 65–140)
GLUCOSE SERPL-MCNC: 87 MG/DL (ref 65–140)
GLUCOSE SERPL-MCNC: 89 MG/DL (ref 65–140)
GLUCOSE SERPL-MCNC: 91 MG/DL (ref 65–140)
GLUCOSE SERPL-MCNC: 92 MG/DL (ref 65–140)
GLUCOSE SERPL-MCNC: 96 MG/DL (ref 65–140)
GLUCOSE SERPL-MCNC: 97 MG/DL (ref 65–140)
GLUCOSE SERPL-MCNC: 97 MG/DL (ref 65–140)
GLUCOSE SERPL-MCNC: 98 MG/DL (ref 65–140)
GLUCOSE SERPL-MCNC: 98 MG/DL (ref 65–140)
GLUCOSE UR STRIP-MCNC: ABNORMAL MG/DL
GLUCOSE UR STRIP-MCNC: NEGATIVE MG/DL
GRAN CASTS #/AREA URNS LPF: ABNORMAL /[LPF]
HBA1C MFR BLD: 5 %
HBV CORE AB SER QL: NORMAL
HBV CORE IGM SER QL: NORMAL
HBV SURFACE AB SER-ACNC: 3.96 MIU/ML
HBV SURFACE AG SER QL: NORMAL
HCO3 BLDA-SCNC: 11.4 MMOL/L (ref 22–28)
HCO3 BLDA-SCNC: 17 MMOL/L (ref 22–28)
HCO3 BLDA-SCNC: 19 MMOL/L (ref 22–28)
HCO3 BLDA-SCNC: 21 MMOL/L (ref 22–28)
HCO3 BLDA-SCNC: 9.9 MMOL/L (ref 22–28)
HCO3 BLDV-SCNC: 8.6 MMOL/L (ref 24–30)
HCT VFR BLD AUTO: 22.4 % (ref 36.5–49.3)
HCT VFR BLD AUTO: 22.6 % (ref 36.5–49.3)
HCT VFR BLD AUTO: 22.9 % (ref 36.5–49.3)
HCT VFR BLD AUTO: 23 % (ref 36.5–49.3)
HCT VFR BLD AUTO: 23.1 % (ref 36.5–49.3)
HCT VFR BLD AUTO: 24.1 % (ref 36.5–49.3)
HCT VFR BLD AUTO: 24.3 % (ref 36.5–49.3)
HCT VFR BLD AUTO: 24.6 % (ref 36.5–49.3)
HCT VFR BLD AUTO: 24.9 % (ref 36.5–49.3)
HCT VFR BLD AUTO: 25.4 % (ref 36.5–49.3)
HCT VFR BLD AUTO: 25.7 % (ref 36.5–49.3)
HCT VFR BLD AUTO: 26 % (ref 36.5–49.3)
HCT VFR BLD AUTO: 26 % (ref 36.5–49.3)
HCT VFR BLD AUTO: 26.1 % (ref 36.5–49.3)
HCT VFR BLD AUTO: 26.3 % (ref 36.5–49.3)
HCT VFR BLD AUTO: 26.4 % (ref 36.5–49.3)
HCT VFR BLD AUTO: 26.5 % (ref 36.5–49.3)
HCT VFR BLD AUTO: 26.7 % (ref 36.5–49.3)
HCT VFR BLD AUTO: 27.6 % (ref 36.5–49.3)
HCT VFR BLD AUTO: 27.9 % (ref 36.5–49.3)
HCT VFR BLD AUTO: 28.3 % (ref 36.5–49.3)
HCT VFR BLD AUTO: 28.9 % (ref 36.5–49.3)
HCT VFR BLD AUTO: 31.7 % (ref 36.5–49.3)
HCT VFR BLD CALC: 25 % (ref 36.5–49.3)
HCT VFR BLD CALC: 26 % (ref 36.5–49.3)
HCV AB SER QL: NORMAL
HGB BLD-MCNC: 6.9 G/DL (ref 12–17)
HGB BLD-MCNC: 7 G/DL (ref 12–17)
HGB BLD-MCNC: 7.3 G/DL (ref 12–17)
HGB BLD-MCNC: 7.5 G/DL (ref 12–17)
HGB BLD-MCNC: 7.6 G/DL (ref 12–17)
HGB BLD-MCNC: 7.8 G/DL (ref 12–17)
HGB BLD-MCNC: 7.8 G/DL (ref 12–17)
HGB BLD-MCNC: 7.9 G/DL (ref 12–17)
HGB BLD-MCNC: 7.9 G/DL (ref 12–17)
HGB BLD-MCNC: 8.1 G/DL (ref 12–17)
HGB BLD-MCNC: 8.1 G/DL (ref 12–17)
HGB BLD-MCNC: 8.2 G/DL (ref 12–17)
HGB BLD-MCNC: 8.4 G/DL (ref 12–17)
HGB BLD-MCNC: 8.4 G/DL (ref 12–17)
HGB BLD-MCNC: 8.7 G/DL (ref 12–17)
HGB BLD-MCNC: 9.4 G/DL (ref 12–17)
HGB BLDA-MCNC: 8.5 G/DL (ref 12–17)
HGB BLDA-MCNC: 8.8 G/DL (ref 12–17)
HGB UR QL STRIP.AUTO: ABNORMAL
HGB UR QL STRIP.AUTO: NEGATIVE
HYALINE CASTS #/AREA URNS LPF: ABNORMAL /LPF
IMM GRANULOCYTES # BLD AUTO: 0.07 THOUSAND/UL (ref 0–0.2)
IMM GRANULOCYTES # BLD AUTO: 0.07 THOUSAND/UL (ref 0–0.2)
IMM GRANULOCYTES # BLD AUTO: 0.08 THOUSAND/UL (ref 0–0.2)
IMM GRANULOCYTES # BLD AUTO: 0.12 THOUSAND/UL (ref 0–0.2)
IMM GRANULOCYTES # BLD AUTO: 0.13 THOUSAND/UL (ref 0–0.2)
IMM GRANULOCYTES # BLD AUTO: 0.23 THOUSAND/UL (ref 0–0.2)
IMM GRANULOCYTES # BLD AUTO: 0.27 THOUSAND/UL (ref 0–0.2)
IMM GRANULOCYTES NFR BLD AUTO: 1 % (ref 0–2)
IMM GRANULOCYTES NFR BLD AUTO: 2 % (ref 0–2)
INR PPP: 1.17 (ref 0.84–1.19)
INR PPP: 1.57 (ref 0.84–1.19)
INR PPP: 1.61 (ref 0.84–1.19)
INR PPP: 1.66 (ref 0.84–1.19)
INR PPP: 1.74 (ref 0.84–1.19)
INR PPP: 1.84 (ref 0.84–1.19)
INR PPP: 1.86 (ref 0.84–1.19)
INR PPP: 1.94 (ref 0.84–1.19)
INR PPP: 1.95 (ref 0.84–1.19)
INR PPP: 2.35 (ref 0.84–1.19)
INR PPP: 2.5 (ref 0.84–1.19)
INR PPP: 2.51 (ref 0.84–1.19)
INR PPP: >15 (ref 0.84–1.19)
INR PPP: >15 (ref 0.84–1.19)
INSULIN SERPL-ACNC: 2.2 UIU/ML
INTERVENTRICULAR SEPTUM IN DIASTOLE (PARASTERNAL SHORT AXIS VIEW): 2 CM
INTERVENTRICULAR SEPTUM: 2 CM (ref 0.6–1.1)
IRON SATN MFR SERPL: 12 % (ref 20–50)
IRON SERPL-MCNC: 33 UG/DL (ref 65–175)
KETONES UR STRIP-MCNC: NEGATIVE MG/DL
KETONES UR STRIP-MCNC: NEGATIVE MG/DL
LACTATE SERPL-SCNC: 1.5 MMOL/L (ref 0.5–2)
LACTATE SERPL-SCNC: 1.6 MMOL/L (ref 0.5–2)
LACTATE SERPL-SCNC: 1.8 MMOL/L (ref 0.5–2)
LACTATE SERPL-SCNC: 1.9 MMOL/L (ref 0.5–2)
LACTATE SERPL-SCNC: 2.5 MMOL/L (ref 0.5–2)
LACTATE SERPL-SCNC: 3.6 MMOL/L (ref 0.5–2)
LACTATE SERPL-SCNC: 4.6 MMOL/L (ref 0.5–2)
LACTATE SERPL-SCNC: 5.1 MMOL/L (ref 0.5–2)
LACTATE SERPL-SCNC: 5.1 MMOL/L (ref 0.5–2)
LACTATE SERPL-SCNC: 5.7 MMOL/L (ref 0.5–2)
LACTATE SERPL-SCNC: 7.8 MMOL/L (ref 0.5–2)
LACTATE SERPL-SCNC: 8.4 MMOL/L (ref 0.5–2)
LACTATE SERPL-SCNC: 8.5 MMOL/L (ref 0.5–2)
LACTATE SERPL-SCNC: 8.8 MMOL/L (ref 0.5–2)
LACTATE SERPL-SCNC: 9.4 MMOL/L (ref 0.5–2)
LACTATE SERPL-SCNC: 9.7 MMOL/L (ref 0.5–2)
LACTATE SERPL-SCNC: 9.9 MMOL/L (ref 0.5–2)
LEFT ATRIUM SIZE: 4.7 CM
LEFT INTERNAL DIMENSION IN SYSTOLE: 2.2 CM (ref 2.1–4)
LEFT VENTRICULAR INTERNAL DIMENSION IN DIASTOLE: 4.1 CM (ref 3.5–6)
LEFT VENTRICULAR POSTERIOR WALL IN END DIASTOLE: 1.2 CM
LEFT VENTRICULAR STROKE VOLUME: 58 ML
LEUKOCYTE ESTERASE UR QL STRIP: ABNORMAL
LEUKOCYTE ESTERASE UR QL STRIP: NEGATIVE
LIPASE SERPL-CCNC: 118 U/L (ref 73–393)
LVSV (TEICH): 58 ML
LYMPHOCYTES # BLD AUTO: 0.15 THOUSAND/UL (ref 0.6–4.47)
LYMPHOCYTES # BLD AUTO: 0.76 THOUSANDS/ÂΜL (ref 0.6–4.47)
LYMPHOCYTES # BLD AUTO: 0.78 THOUSANDS/ÂΜL (ref 0.6–4.47)
LYMPHOCYTES # BLD AUTO: 0.86 THOUSANDS/ÂΜL (ref 0.6–4.47)
LYMPHOCYTES # BLD AUTO: 0.91 THOUSANDS/ÂΜL (ref 0.6–4.47)
LYMPHOCYTES # BLD AUTO: 1 % (ref 14–44)
LYMPHOCYTES # BLD AUTO: 1 THOUSANDS/ÂΜL (ref 0.6–4.47)
LYMPHOCYTES # BLD AUTO: 1.18 THOUSANDS/ÂΜL (ref 0.6–4.47)
LYMPHOCYTES # BLD AUTO: 1.22 THOUSANDS/ÂΜL (ref 0.6–4.47)
LYMPHOCYTES # BLD AUTO: 1.33 THOUSANDS/ÂΜL (ref 0.6–4.47)
LYMPHOCYTES # BLD AUTO: 1.42 THOUSANDS/ÂΜL (ref 0.6–4.47)
LYMPHOCYTES NFR BLD AUTO: 10 % (ref 14–44)
LYMPHOCYTES NFR BLD AUTO: 10 % (ref 14–44)
LYMPHOCYTES NFR BLD AUTO: 13 % (ref 14–44)
LYMPHOCYTES NFR BLD AUTO: 19 % (ref 14–44)
LYMPHOCYTES NFR BLD AUTO: 22 % (ref 14–44)
LYMPHOCYTES NFR BLD AUTO: 5 % (ref 14–44)
LYMPHOCYTES NFR BLD AUTO: 7 % (ref 14–44)
LYMPHOCYTES NFR BLD AUTO: 8 % (ref 14–44)
LYMPHOCYTES NFR BLD AUTO: 9 % (ref 14–44)
MACROCYTES BLD QL AUTO: PRESENT
MAGNESIUM SERPL-MCNC: 1.4 MG/DL (ref 1.6–2.6)
MAGNESIUM SERPL-MCNC: 1.5 MG/DL (ref 1.6–2.6)
MAGNESIUM SERPL-MCNC: 1.7 MG/DL (ref 1.6–2.6)
MAGNESIUM SERPL-MCNC: 1.8 MG/DL (ref 1.6–2.6)
MAGNESIUM SERPL-MCNC: 1.9 MG/DL (ref 1.6–2.6)
MAGNESIUM SERPL-MCNC: 2 MG/DL (ref 1.6–2.6)
MAGNESIUM SERPL-MCNC: 2.1 MG/DL (ref 1.6–2.6)
MAGNESIUM SERPL-MCNC: 2.2 MG/DL (ref 1.6–2.6)
MAGNESIUM SERPL-MCNC: 2.3 MG/DL (ref 1.6–2.6)
MAGNESIUM SERPL-MCNC: 2.3 MG/DL (ref 1.6–2.6)
MAGNESIUM SERPL-MCNC: 2.4 MG/DL (ref 1.6–2.6)
MAGNESIUM SERPL-MCNC: 2.4 MG/DL (ref 1.6–2.6)
MCH RBC QN AUTO: 29 PG (ref 26.8–34.3)
MCH RBC QN AUTO: 29.3 PG (ref 26.8–34.3)
MCH RBC QN AUTO: 29.4 PG (ref 26.8–34.3)
MCH RBC QN AUTO: 29.7 PG (ref 26.8–34.3)
MCH RBC QN AUTO: 29.8 PG (ref 26.8–34.3)
MCH RBC QN AUTO: 30 PG (ref 26.8–34.3)
MCH RBC QN AUTO: 30.3 PG (ref 26.8–34.3)
MCH RBC QN AUTO: 30.3 PG (ref 26.8–34.3)
MCH RBC QN AUTO: 30.4 PG (ref 26.8–34.3)
MCH RBC QN AUTO: 30.5 PG (ref 26.8–34.3)
MCH RBC QN AUTO: 30.6 PG (ref 26.8–34.3)
MCH RBC QN AUTO: 30.7 PG (ref 26.8–34.3)
MCH RBC QN AUTO: 30.7 PG (ref 26.8–34.3)
MCH RBC QN AUTO: 30.8 PG (ref 26.8–34.3)
MCH RBC QN AUTO: 31 PG (ref 26.8–34.3)
MCH RBC QN AUTO: 31.3 PG (ref 26.8–34.3)
MCH RBC QN AUTO: 31.7 PG (ref 26.8–34.3)
MCHC RBC AUTO-ENTMCNC: 29 G/DL (ref 31.4–37.4)
MCHC RBC AUTO-ENTMCNC: 29.1 G/DL (ref 31.4–37.4)
MCHC RBC AUTO-ENTMCNC: 29.2 G/DL (ref 31.4–37.4)
MCHC RBC AUTO-ENTMCNC: 29.2 G/DL (ref 31.4–37.4)
MCHC RBC AUTO-ENTMCNC: 29.7 G/DL (ref 31.4–37.4)
MCHC RBC AUTO-ENTMCNC: 29.7 G/DL (ref 31.4–37.4)
MCHC RBC AUTO-ENTMCNC: 30 G/DL (ref 31.4–37.4)
MCHC RBC AUTO-ENTMCNC: 30.4 G/DL (ref 31.4–37.4)
MCHC RBC AUTO-ENTMCNC: 30.6 G/DL (ref 31.4–37.4)
MCHC RBC AUTO-ENTMCNC: 30.7 G/DL (ref 31.4–37.4)
MCHC RBC AUTO-ENTMCNC: 30.7 G/DL (ref 31.4–37.4)
MCHC RBC AUTO-ENTMCNC: 30.9 G/DL (ref 31.4–37.4)
MCHC RBC AUTO-ENTMCNC: 30.9 G/DL (ref 31.4–37.4)
MCHC RBC AUTO-ENTMCNC: 31.1 G/DL (ref 31.4–37.4)
MCHC RBC AUTO-ENTMCNC: 31.2 G/DL (ref 31.4–37.4)
MCHC RBC AUTO-ENTMCNC: 31.3 G/DL (ref 31.4–37.4)
MCHC RBC AUTO-ENTMCNC: 31.4 G/DL (ref 31.4–37.4)
MCHC RBC AUTO-ENTMCNC: 31.7 G/DL (ref 31.4–37.4)
MCHC RBC AUTO-ENTMCNC: 31.9 G/DL (ref 31.4–37.4)
MCV RBC AUTO: 100 FL (ref 82–98)
MCV RBC AUTO: 101 FL (ref 82–98)
MCV RBC AUTO: 104 FL (ref 82–98)
MCV RBC AUTO: 105 FL (ref 82–98)
MCV RBC AUTO: 106 FL (ref 82–98)
MCV RBC AUTO: 106 FL (ref 82–98)
MCV RBC AUTO: 95 FL (ref 82–98)
MCV RBC AUTO: 95 FL (ref 82–98)
MCV RBC AUTO: 96 FL (ref 82–98)
MCV RBC AUTO: 97 FL (ref 82–98)
MCV RBC AUTO: 99 FL (ref 82–98)
MISCELLANEOUS LAB TEST RESULT: NORMAL
MONOCYTES # BLD AUTO: 0 THOUSAND/UL (ref 0–1.22)
MONOCYTES # BLD AUTO: 0.45 THOUSAND/ÂΜL (ref 0.17–1.22)
MONOCYTES # BLD AUTO: 0.61 THOUSAND/ÂΜL (ref 0.17–1.22)
MONOCYTES # BLD AUTO: 0.67 THOUSAND/ÂΜL (ref 0.17–1.22)
MONOCYTES # BLD AUTO: 0.73 THOUSAND/ÂΜL (ref 0.17–1.22)
MONOCYTES # BLD AUTO: 0.75 THOUSAND/ÂΜL (ref 0.17–1.22)
MONOCYTES # BLD AUTO: 0.76 THOUSAND/ÂΜL (ref 0.17–1.22)
MONOCYTES # BLD AUTO: 0.94 THOUSAND/ÂΜL (ref 0.17–1.22)
MONOCYTES # BLD AUTO: 1 THOUSAND/ÂΜL (ref 0.17–1.22)
MONOCYTES # BLD AUTO: 1.11 THOUSAND/ÂΜL (ref 0.17–1.22)
MONOCYTES NFR BLD AUTO: 10 % (ref 4–12)
MONOCYTES NFR BLD AUTO: 12 % (ref 4–12)
MONOCYTES NFR BLD AUTO: 3 % (ref 4–12)
MONOCYTES NFR BLD AUTO: 6 % (ref 4–12)
MONOCYTES NFR BLD AUTO: 7 % (ref 4–12)
MONOCYTES NFR BLD AUTO: 7 % (ref 4–12)
MONOCYTES NFR BLD AUTO: 8 % (ref 4–12)
MONOCYTES NFR BLD AUTO: 9 % (ref 4–12)
MONOCYTES NFR BLD AUTO: 9 % (ref 4–12)
MONOCYTES NFR BLD: 0 % (ref 4–12)
MRSA NOSE QL CULT: NORMAL
MV MEAN GRADIENT: 7 MMHG
MV PEAK GRADIENT: 19 MMHG
MV STENOSIS PRESSURE HALF TIME: 73 MS
MV VALVE AREA P 1/2 METHOD: 3.01 CM2
NEUTROPHILS # BLD AUTO: 13.5 THOUSANDS/ÂΜL (ref 1.85–7.62)
NEUTROPHILS # BLD AUTO: 3.97 THOUSANDS/ÂΜL (ref 1.85–7.62)
NEUTROPHILS # BLD AUTO: 4.09 THOUSANDS/ÂΜL (ref 1.85–7.62)
NEUTROPHILS # BLD AUTO: 7.5 THOUSANDS/ÂΜL (ref 1.85–7.62)
NEUTROPHILS # BLD AUTO: 8.06 THOUSANDS/ÂΜL (ref 1.85–7.62)
NEUTROPHILS # BLD AUTO: 9.06 THOUSANDS/ÂΜL (ref 1.85–7.62)
NEUTROPHILS # BLD AUTO: 9.27 THOUSANDS/ÂΜL (ref 1.85–7.62)
NEUTROPHILS # BLD AUTO: 9.64 THOUSANDS/ÂΜL (ref 1.85–7.62)
NEUTROPHILS # BLD AUTO: 9.71 THOUSANDS/ÂΜL (ref 1.85–7.62)
NEUTROPHILS # BLD MANUAL: 14.69 THOUSAND/UL (ref 1.85–7.62)
NEUTS BAND NFR BLD MANUAL: 15 % (ref 0–8)
NEUTS SEG NFR BLD AUTO: 66 % (ref 43–75)
NEUTS SEG NFR BLD AUTO: 67 % (ref 43–75)
NEUTS SEG NFR BLD AUTO: 74 % (ref 43–75)
NEUTS SEG NFR BLD AUTO: 76 % (ref 43–75)
NEUTS SEG NFR BLD AUTO: 82 % (ref 43–75)
NEUTS SEG NFR BLD AUTO: 82 % (ref 43–75)
NEUTS SEG NFR BLD AUTO: 83 % (ref 43–75)
NEUTS SEG NFR BLD AUTO: 83 % (ref 43–75)
NEUTS SEG NFR BLD AUTO: 85 % (ref 43–75)
NEUTS SEG NFR BLD AUTO: 91 % (ref 43–75)
NITRITE UR QL STRIP: NEGATIVE
NITRITE UR QL STRIP: NEGATIVE
NON-SQ EPI CELLS URNS QL MICRO: ABNORMAL /HPF
NON-SQ EPI CELLS URNS QL MICRO: ABNORMAL /HPF
NRBC BLD AUTO-RTO: 0 /100 WBCS
NRBC BLD AUTO-RTO: 1 /100 WBCS
NRBC BLD AUTO-RTO: 1 /100 WBCS
NRBC BLD AUTO-RTO: 2 /100 WBCS
O2 CT BLDA-SCNC: 11.4 ML/DL (ref 16–23)
O2 CT BLDA-SCNC: 13.2 ML/DL (ref 16–23)
O2 CT BLDA-SCNC: 13.3 ML/DL (ref 16–23)
O2 CT BLDV-SCNC: 13.4 ML/DL
OXYHGB MFR BLDA: 90.6 % (ref 94–97)
OXYHGB MFR BLDA: 95.5 % (ref 94–97)
OXYHGB MFR BLDA: 97.3 % (ref 94–97)
P AXIS: 30 DEGREES
PCO2 BLD: 12 MMOL/L (ref 21–32)
PCO2 BLD: 23 MMOL/L (ref 21–32)
PCO2 BLD: 28.7 MM HG (ref 36–44)
PCO2 BLD: 58.1 MM HG (ref 36–44)
PCO2 BLDA: 26.2 MM HG (ref 36–44)
PCO2 BLDA: 30 MM HG (ref 36–44)
PCO2 BLDA: 36.6 MM HG (ref 36–44)
PCO2 BLDV: 25.7 MM HG (ref 42–50)
PH BLD: 7.17 [PH] (ref 7.35–7.45)
PH BLD: 7.21 [PH] (ref 7.35–7.45)
PH BLDA: 7.19 [PH] (ref 7.35–7.45)
PH BLDA: 7.33 [PH] (ref 7.35–7.45)
PH BLDA: 7.37 [PH] (ref 7.35–7.45)
PH BLDV: 7.14 [PH] (ref 7.3–7.4)
PH UR STRIP.AUTO: 5.5 [PH]
PH UR STRIP.AUTO: 6 [PH]
PHOSPHATE SERPL-MCNC: 1.7 MG/DL (ref 2.3–4.1)
PHOSPHATE SERPL-MCNC: 1.8 MG/DL (ref 2.3–4.1)
PHOSPHATE SERPL-MCNC: 2.2 MG/DL (ref 2.3–4.1)
PHOSPHATE SERPL-MCNC: 2.3 MG/DL (ref 2.3–4.1)
PHOSPHATE SERPL-MCNC: 2.4 MG/DL (ref 2.3–4.1)
PHOSPHATE SERPL-MCNC: 2.5 MG/DL (ref 2.3–4.1)
PHOSPHATE SERPL-MCNC: 2.7 MG/DL (ref 2.3–4.1)
PHOSPHATE SERPL-MCNC: 3.1 MG/DL (ref 2.3–4.1)
PHOSPHATE SERPL-MCNC: 3.3 MG/DL (ref 2.3–4.1)
PHOSPHATE SERPL-MCNC: 3.7 MG/DL (ref 2.3–4.1)
PHOSPHATE SERPL-MCNC: 3.7 MG/DL (ref 2.3–4.1)
PHOSPHATE SERPL-MCNC: 3.9 MG/DL (ref 2.3–4.1)
PHOSPHATE SERPL-MCNC: 4.5 MG/DL (ref 2.3–4.1)
PHOSPHATE SERPL-MCNC: 5.1 MG/DL (ref 2.3–4.1)
PHOSPHATE SERPL-MCNC: 5.1 MG/DL (ref 2.3–4.1)
PHOSPHATE SERPL-MCNC: 5.8 MG/DL (ref 2.3–4.1)
PHOSPHATE SERPL-MCNC: 6.2 MG/DL (ref 2.3–4.1)
PHOSPHATE SERPL-MCNC: 7.7 MG/DL (ref 2.3–4.1)
PLATELET # BLD AUTO: 110 THOUSANDS/UL (ref 149–390)
PLATELET # BLD AUTO: 123 THOUSANDS/UL (ref 149–390)
PLATELET # BLD AUTO: 136 THOUSANDS/UL (ref 149–390)
PLATELET # BLD AUTO: 138 THOUSANDS/UL (ref 149–390)
PLATELET # BLD AUTO: 171 THOUSANDS/UL (ref 149–390)
PLATELET # BLD AUTO: 180 THOUSANDS/UL (ref 149–390)
PLATELET # BLD AUTO: 184 THOUSANDS/UL (ref 149–390)
PLATELET # BLD AUTO: 203 THOUSANDS/UL (ref 149–390)
PLATELET # BLD AUTO: 205 THOUSANDS/UL (ref 149–390)
PLATELET # BLD AUTO: 250 THOUSANDS/UL (ref 149–390)
PLATELET # BLD AUTO: 266 THOUSANDS/UL (ref 149–390)
PLATELET # BLD AUTO: 274 THOUSANDS/UL (ref 149–390)
PLATELET # BLD AUTO: 35 THOUSANDS/UL (ref 149–390)
PLATELET # BLD AUTO: 61 THOUSANDS/UL (ref 149–390)
PLATELET # BLD AUTO: 63 THOUSANDS/UL (ref 149–390)
PLATELET # BLD AUTO: 67 THOUSANDS/UL (ref 149–390)
PLATELET # BLD AUTO: 69 THOUSANDS/UL (ref 149–390)
PLATELET # BLD AUTO: 78 THOUSANDS/UL (ref 149–390)
PLATELET # BLD AUTO: 88 THOUSANDS/UL (ref 149–390)
PLATELET # BLD AUTO: 92 THOUSANDS/UL (ref 149–390)
PLATELET BLD QL SMEAR: ABNORMAL
PMV BLD AUTO: 10 FL (ref 8.9–12.7)
PMV BLD AUTO: 10.1 FL (ref 8.9–12.7)
PMV BLD AUTO: 10.2 FL (ref 8.9–12.7)
PMV BLD AUTO: 10.3 FL (ref 8.9–12.7)
PMV BLD AUTO: 10.3 FL (ref 8.9–12.7)
PMV BLD AUTO: 10.4 FL (ref 8.9–12.7)
PMV BLD AUTO: 10.4 FL (ref 8.9–12.7)
PMV BLD AUTO: 10.5 FL (ref 8.9–12.7)
PMV BLD AUTO: 10.6 FL (ref 8.9–12.7)
PMV BLD AUTO: 10.8 FL (ref 8.9–12.7)
PMV BLD AUTO: 10.8 FL (ref 8.9–12.7)
PMV BLD AUTO: 10.9 FL (ref 8.9–12.7)
PMV BLD AUTO: 10.9 FL (ref 8.9–12.7)
PMV BLD AUTO: 11 FL (ref 8.9–12.7)
PMV BLD AUTO: 11.1 FL (ref 8.9–12.7)
PMV BLD AUTO: 11.3 FL (ref 8.9–12.7)
PMV BLD AUTO: 11.3 FL (ref 8.9–12.7)
PMV BLD AUTO: 11.4 FL (ref 8.9–12.7)
PMV BLD AUTO: 9.2 FL (ref 8.9–12.7)
PMV BLD AUTO: 9.7 FL (ref 8.9–12.7)
PO2 BLD: 77 MM HG (ref 75–129)
PO2 BLD: 82 MM HG (ref 75–129)
PO2 BLDA: 102.4 MM HG (ref 75–129)
PO2 BLDA: 123.5 MM HG (ref 75–129)
PO2 BLDA: 71.2 MM HG (ref 75–129)
PO2 BLDV: 128.3 MM HG (ref 35–45)
POIKILOCYTOSIS BLD QL SMEAR: PRESENT
POLYCHROMASIA BLD QL SMEAR: PRESENT
POTASSIUM BLD-SCNC: 5.5 MMOL/L (ref 3.5–5.3)
POTASSIUM BLD-SCNC: 6.9 MMOL/L (ref 3.5–5.3)
POTASSIUM SERPL-SCNC: 3.8 MMOL/L (ref 3.5–5.3)
POTASSIUM SERPL-SCNC: 3.9 MMOL/L (ref 3.5–5.3)
POTASSIUM SERPL-SCNC: 4 MMOL/L (ref 3.5–5.3)
POTASSIUM SERPL-SCNC: 4.1 MMOL/L (ref 3.5–5.3)
POTASSIUM SERPL-SCNC: 4.2 MMOL/L (ref 3.5–5.3)
POTASSIUM SERPL-SCNC: 4.3 MMOL/L (ref 3.5–5.3)
POTASSIUM SERPL-SCNC: 4.3 MMOL/L (ref 3.5–5.3)
POTASSIUM SERPL-SCNC: 4.4 MMOL/L (ref 3.5–5.3)
POTASSIUM SERPL-SCNC: 4.5 MMOL/L (ref 3.5–5.3)
POTASSIUM SERPL-SCNC: 4.7 MMOL/L (ref 3.5–5.3)
POTASSIUM SERPL-SCNC: 4.7 MMOL/L (ref 3.5–5.3)
POTASSIUM SERPL-SCNC: 4.8 MMOL/L (ref 3.5–5.3)
POTASSIUM SERPL-SCNC: 5.1 MMOL/L (ref 3.5–5.3)
POTASSIUM SERPL-SCNC: 5.2 MMOL/L (ref 3.5–5.3)
POTASSIUM SERPL-SCNC: 5.2 MMOL/L (ref 3.5–5.3)
POTASSIUM SERPL-SCNC: 5.3 MMOL/L (ref 3.5–5.3)
POTASSIUM SERPL-SCNC: 5.3 MMOL/L (ref 3.5–5.3)
POTASSIUM SERPL-SCNC: 5.4 MMOL/L (ref 3.5–5.3)
POTASSIUM SERPL-SCNC: 5.4 MMOL/L (ref 3.5–5.3)
POTASSIUM SERPL-SCNC: 5.5 MMOL/L (ref 3.5–5.3)
POTASSIUM SERPL-SCNC: 5.6 MMOL/L (ref 3.5–5.3)
POTASSIUM SERPL-SCNC: 5.8 MMOL/L (ref 3.5–5.3)
POTASSIUM SERPL-SCNC: 6.9 MMOL/L (ref 3.5–5.3)
POTASSIUM SERPL-SCNC: 7.4 MMOL/L (ref 3.5–5.3)
POTASSIUM SERPL-SCNC: 8.1 MMOL/L (ref 3.5–5.3)
PR INTERVAL: 0 MS
PR INTERVAL: 300 MS
PR INTERVAL: 316 MS
PROCALCITONIN SERPL-MCNC: 0.19 NG/ML
PROCALCITONIN SERPL-MCNC: 0.2 NG/ML
PROCALCITONIN SERPL-MCNC: 0.25 NG/ML
PROCALCITONIN SERPL-MCNC: 0.42 NG/ML
PROCALCITONIN SERPL-MCNC: 0.46 NG/ML
PROT SERPL-MCNC: 6 G/DL (ref 6.4–8.4)
PROT SERPL-MCNC: 7 G/DL (ref 6.4–8.4)
PROT SERPL-MCNC: 7.1 G/DL (ref 6.4–8.4)
PROT SERPL-MCNC: 8.1 G/DL (ref 6.4–8.4)
PROT UR STRIP-MCNC: ABNORMAL MG/DL
PROT UR STRIP-MCNC: ABNORMAL MG/DL
PROTHROMBIN TIME: 109.8 SECONDS (ref 11.6–14.5)
PROTHROMBIN TIME: 111.8 SECONDS (ref 11.6–14.5)
PROTHROMBIN TIME: 15.1 SECONDS (ref 11.6–14.5)
PROTHROMBIN TIME: 19 SECONDS (ref 11.6–14.5)
PROTHROMBIN TIME: 19.4 SECONDS (ref 11.6–14.5)
PROTHROMBIN TIME: 19.9 SECONDS (ref 11.6–14.5)
PROTHROMBIN TIME: 20.6 SECONDS (ref 11.6–14.5)
PROTHROMBIN TIME: 21.5 SECONDS (ref 11.6–14.5)
PROTHROMBIN TIME: 21.7 SECONDS (ref 11.6–14.5)
PROTHROMBIN TIME: 22.4 SECONDS (ref 11.6–14.5)
PROTHROMBIN TIME: 22.5 SECONDS (ref 11.6–14.5)
PROTHROMBIN TIME: 26 SECONDS (ref 11.6–14.5)
PROTHROMBIN TIME: 27.3 SECONDS (ref 11.6–14.5)
PROTHROMBIN TIME: 27.3 SECONDS (ref 11.6–14.5)
QRS AXIS: 101 DEGREES
QRS AXIS: 101 DEGREES
QRS AXIS: 103 DEGREES
QRS AXIS: 120 DEGREES
QRS AXIS: 132 DEGREES
QRS AXIS: 143 DEGREES
QRS AXIS: 145 DEGREES
QRS AXIS: 146 DEGREES
QRS AXIS: 150 DEGREES
QRS AXIS: 155 DEGREES
QRS AXIS: 180 DEGREES
QRS AXIS: 186 DEGREES
QRS AXIS: 228 DEGREES
QRS AXIS: 231 DEGREES
QRS AXIS: 91 DEGREES
QRSD INTERVAL: 144 MS
QRSD INTERVAL: 148 MS
QRSD INTERVAL: 150 MS
QRSD INTERVAL: 154 MS
QRSD INTERVAL: 163 MS
QRSD INTERVAL: 163 MS
QRSD INTERVAL: 167 MS
QRSD INTERVAL: 167 MS
QRSD INTERVAL: 171 MS
QRSD INTERVAL: 175 MS
QRSD INTERVAL: 175 MS
QRSD INTERVAL: 178 MS
QRSD INTERVAL: 179 MS
QT INTERVAL: 366 MS
QT INTERVAL: 370 MS
QT INTERVAL: 410 MS
QT INTERVAL: 428 MS
QT INTERVAL: 438 MS
QT INTERVAL: 442 MS
QT INTERVAL: 446 MS
QT INTERVAL: 446 MS
QT INTERVAL: 458 MS
QT INTERVAL: 467 MS
QT INTERVAL: 533 MS
QT INTERVAL: 554 MS
QT INTERVAL: 558 MS
QT INTERVAL: 620 MS
QT INTERVAL: 642 MS
QTC INTERVAL: 480 MS
QTC INTERVAL: 484 MS
QTC INTERVAL: 486 MS
QTC INTERVAL: 488 MS
QTC INTERVAL: 490 MS
QTC INTERVAL: 494 MS
QTC INTERVAL: 529 MS
QTC INTERVAL: 531 MS
QTC INTERVAL: 537 MS
QTC INTERVAL: 537 MS
QTC INTERVAL: 555 MS
QTC INTERVAL: 572 MS
QTC INTERVAL: 581 MS
QTC INTERVAL: 598 MS
QTC INTERVAL: 608 MS
RA PRESSURE ESTIMATED: 15 MMHG
RBC # BLD AUTO: 2.28 MILLION/UL (ref 3.88–5.62)
RBC # BLD AUTO: 2.32 MILLION/UL (ref 3.88–5.62)
RBC # BLD AUTO: 2.43 MILLION/UL (ref 3.88–5.62)
RBC # BLD AUTO: 2.48 MILLION/UL (ref 3.88–5.62)
RBC # BLD AUTO: 2.5 MILLION/UL (ref 3.88–5.62)
RBC # BLD AUTO: 2.56 MILLION/UL (ref 3.88–5.62)
RBC # BLD AUTO: 2.59 MILLION/UL (ref 3.88–5.62)
RBC # BLD AUTO: 2.61 MILLION/UL (ref 3.88–5.62)
RBC # BLD AUTO: 2.61 MILLION/UL (ref 3.88–5.62)
RBC # BLD AUTO: 2.62 MILLION/UL (ref 3.88–5.62)
RBC # BLD AUTO: 2.63 MILLION/UL (ref 3.88–5.62)
RBC # BLD AUTO: 2.65 MILLION/UL (ref 3.88–5.62)
RBC # BLD AUTO: 2.69 MILLION/UL (ref 3.88–5.62)
RBC # BLD AUTO: 2.71 MILLION/UL (ref 3.88–5.62)
RBC # BLD AUTO: 2.74 MILLION/UL (ref 3.88–5.62)
RBC # BLD AUTO: 2.75 MILLION/UL (ref 3.88–5.62)
RBC # BLD AUTO: 2.86 MILLION/UL (ref 3.88–5.62)
RBC # BLD AUTO: 2.92 MILLION/UL (ref 3.88–5.62)
RBC # BLD AUTO: 3.21 MILLION/UL (ref 3.88–5.62)
RBC #/AREA URNS AUTO: ABNORMAL /HPF
RBC #/AREA URNS AUTO: ABNORMAL /HPF
RBC MORPH BLD: PRESENT
RH BLD: NEGATIVE
RH BLD: NEGATIVE
RV PSP: 73 MMHG
SAO2 % BLD FROM PO2: 91 % (ref 60–85)
SAO2 % BLD FROM PO2: 94 % (ref 60–85)
SL CV LV EF: 70
SL CV PED ECHO LEFT VENTRICLE DIASTOLIC VOLUME (MOD BIPLANE) 2D: 75 ML
SL CV PED ECHO LEFT VENTRICLE SYSTOLIC VOLUME (MOD BIPLANE) 2D: 17 ML
SODIUM BLD-SCNC: 131 MMOL/L (ref 136–145)
SODIUM BLD-SCNC: 133 MMOL/L (ref 136–145)
SODIUM SERPL-SCNC: 129 MMOL/L (ref 135–147)
SODIUM SERPL-SCNC: 130 MMOL/L (ref 135–147)
SODIUM SERPL-SCNC: 130 MMOL/L (ref 135–147)
SODIUM SERPL-SCNC: 131 MMOL/L (ref 135–147)
SODIUM SERPL-SCNC: 132 MMOL/L (ref 135–147)
SODIUM SERPL-SCNC: 133 MMOL/L (ref 135–147)
SODIUM SERPL-SCNC: 134 MMOL/L (ref 135–147)
SODIUM SERPL-SCNC: 134 MMOL/L (ref 135–147)
SODIUM SERPL-SCNC: 135 MMOL/L (ref 135–147)
SODIUM SERPL-SCNC: 136 MMOL/L (ref 135–147)
SODIUM SERPL-SCNC: 136 MMOL/L (ref 135–147)
SODIUM SERPL-SCNC: 137 MMOL/L (ref 135–147)
SODIUM SERPL-SCNC: 138 MMOL/L (ref 135–147)
SODIUM SERPL-SCNC: 139 MMOL/L (ref 135–147)
SODIUM SERPL-SCNC: 140 MMOL/L (ref 135–147)
SODIUM SERPL-SCNC: 141 MMOL/L (ref 135–147)
SODIUM SERPL-SCNC: 142 MMOL/L (ref 135–147)
SODIUM SERPL-SCNC: 143 MMOL/L (ref 135–147)
SODIUM SERPL-SCNC: 143 MMOL/L (ref 135–147)
SODIUM SERPL-SCNC: 145 MMOL/L (ref 135–147)
SODIUM SERPL-SCNC: 146 MMOL/L (ref 135–147)
SODIUM SERPL-SCNC: 146 MMOL/L (ref 135–147)
SODIUM SERPL-SCNC: 147 MMOL/L (ref 135–147)
SP GR UR STRIP.AUTO: 1.01 (ref 1–1.03)
SP GR UR STRIP.AUTO: 1.02 (ref 1–1.03)
SPECIMEN EXPIRATION DATE: NORMAL
SPECIMEN EXPIRATION DATE: NORMAL
SPECIMEN SOURCE: ABNORMAL
T WAVE AXIS: -1 DEGREES
T WAVE AXIS: -2 DEGREES
T WAVE AXIS: -70 DEGREES
T WAVE AXIS: -70 DEGREES
T WAVE AXIS: 24 DEGREES
T WAVE AXIS: 26 DEGREES
T WAVE AXIS: 31 DEGREES
T WAVE AXIS: 35 DEGREES
T WAVE AXIS: 43 DEGREES
T WAVE AXIS: 50 DEGREES
T WAVE AXIS: 63 DEGREES
T WAVE AXIS: 68 DEGREES
T WAVE AXIS: 72 DEGREES
T WAVE AXIS: 76 DEGREES
T WAVE AXIS: 83 DEGREES
TARGETS BLD QL SMEAR: PRESENT
TIBC SERPL-MCNC: 273 UG/DL (ref 250–450)
TR MAX PG: 58 MMHG
TR PEAK VELOCITY: 3.8 M/S
TRANS CELLS #/AREA URNS HPF: PRESENT /[HPF]
TRICUSPID ANNULAR PLANE SYSTOLIC EXCURSION: 1.2 CM
TRICUSPID VALVE PEAK REGURGITATION VELOCITY: 3.82 M/S
TSH SERPL DL<=0.05 MIU/L-ACNC: 3.9 UIU/ML (ref 0.45–4.5)
UNIT DISPENSE STATUS: NORMAL
UNIT PRODUCT CODE: NORMAL
UNIT PRODUCT VOLUME: 300 ML
UNIT PRODUCT VOLUME: 350 ML
UNIT PRODUCT VOLUME: 350 ML
UNIT RH: NORMAL
UROBILINOGEN UR STRIP-ACNC: <2 MG/DL
UROBILINOGEN UR STRIP-ACNC: <2 MG/DL
VANCOMYCIN TROUGH SERPL-MCNC: 18.6 UG/ML (ref 10–20)
VARIANT LYMPHS # BLD AUTO: 1 %
VENTRICULAR RATE: 101 BPM
VENTRICULAR RATE: 103 BPM
VENTRICULAR RATE: 106 BPM
VENTRICULAR RATE: 52 BPM
VENTRICULAR RATE: 58 BPM
VENTRICULAR RATE: 64 BPM
VENTRICULAR RATE: 65 BPM
VENTRICULAR RATE: 65 BPM
VENTRICULAR RATE: 66 BPM
VENTRICULAR RATE: 68 BPM
VENTRICULAR RATE: 72 BPM
VENTRICULAR RATE: 75 BPM
VENTRICULAR RATE: 87 BPM
VENTRICULAR RATE: 87 BPM
VENTRICULAR RATE: 92 BPM
VIT B12 SERPL-MCNC: 469 PG/ML (ref 180–914)
WBC # BLD AUTO: 10.08 THOUSAND/UL (ref 4.31–10.16)
WBC # BLD AUTO: 10.13 THOUSAND/UL (ref 4.31–10.16)
WBC # BLD AUTO: 10.36 THOUSAND/UL (ref 4.31–10.16)
WBC # BLD AUTO: 10.68 THOUSAND/UL (ref 4.31–10.16)
WBC # BLD AUTO: 10.96 THOUSAND/UL (ref 4.31–10.16)
WBC # BLD AUTO: 11.19 THOUSAND/UL (ref 4.31–10.16)
WBC # BLD AUTO: 11.24 THOUSAND/UL (ref 4.31–10.16)
WBC # BLD AUTO: 11.47 THOUSAND/UL (ref 4.31–10.16)
WBC # BLD AUTO: 11.64 THOUSAND/UL (ref 4.31–10.16)
WBC # BLD AUTO: 11.98 THOUSAND/UL (ref 4.31–10.16)
WBC # BLD AUTO: 12.64 THOUSAND/UL (ref 4.31–10.16)
WBC # BLD AUTO: 13.25 THOUSAND/UL (ref 4.31–10.16)
WBC # BLD AUTO: 14.86 THOUSAND/UL (ref 4.31–10.16)
WBC # BLD AUTO: 14.99 THOUSAND/UL (ref 4.31–10.16)
WBC # BLD AUTO: 16.47 THOUSAND/UL (ref 4.31–10.16)
WBC # BLD AUTO: 6.08 THOUSAND/UL (ref 4.31–10.16)
WBC # BLD AUTO: 6.14 THOUSAND/UL (ref 4.31–10.16)
WBC # BLD AUTO: 9.19 THOUSAND/UL (ref 4.31–10.16)
WBC # BLD AUTO: 9.44 THOUSAND/UL (ref 4.31–10.16)
WBC #/AREA URNS AUTO: ABNORMAL /HPF
WBC #/AREA URNS AUTO: ABNORMAL /HPF
WBC CLUMPS # UR AUTO: PRESENT /UL

## 2023-01-01 PROCEDURE — NC001 PR NO CHARGE: Performed by: NURSE PRACTITIONER

## 2023-01-01 PROCEDURE — 83735 ASSAY OF MAGNESIUM: CPT | Performed by: INTERNAL MEDICINE

## 2023-01-01 PROCEDURE — 84295 ASSAY OF SERUM SODIUM: CPT

## 2023-01-01 PROCEDURE — 36556 INSERT NON-TUNNEL CV CATH: CPT | Performed by: INTERNAL MEDICINE

## 2023-01-01 PROCEDURE — 82948 REAGENT STRIP/BLOOD GLUCOSE: CPT

## 2023-01-01 PROCEDURE — 83735 ASSAY OF MAGNESIUM: CPT | Performed by: EMERGENCY MEDICINE

## 2023-01-01 PROCEDURE — 90945 DIALYSIS ONE EVALUATION: CPT

## 2023-01-01 PROCEDURE — 85025 COMPLETE CBC W/AUTO DIFF WBC: CPT

## 2023-01-01 PROCEDURE — 92526 ORAL FUNCTION THERAPY: CPT

## 2023-01-01 PROCEDURE — 99233 SBSQ HOSP IP/OBS HIGH 50: CPT | Performed by: INTERNAL MEDICINE

## 2023-01-01 PROCEDURE — C9113 INJ PANTOPRAZOLE SODIUM, VIA: HCPCS | Performed by: FAMILY MEDICINE

## 2023-01-01 PROCEDURE — 82330 ASSAY OF CALCIUM: CPT | Performed by: INTERNAL MEDICINE

## 2023-01-01 PROCEDURE — 83605 ASSAY OF LACTIC ACID: CPT | Performed by: STUDENT IN AN ORGANIZED HEALTH CARE EDUCATION/TRAINING PROGRAM

## 2023-01-01 PROCEDURE — 86850 RBC ANTIBODY SCREEN: CPT | Performed by: EMERGENCY MEDICINE

## 2023-01-01 PROCEDURE — 85027 COMPLETE CBC AUTOMATED: CPT | Performed by: STUDENT IN AN ORGANIZED HEALTH CARE EDUCATION/TRAINING PROGRAM

## 2023-01-01 PROCEDURE — 99291 CRITICAL CARE FIRST HOUR: CPT | Performed by: EMERGENCY MEDICINE

## 2023-01-01 PROCEDURE — 93010 ELECTROCARDIOGRAM REPORT: CPT | Performed by: INTERNAL MEDICINE

## 2023-01-01 PROCEDURE — 80048 BASIC METABOLIC PNL TOTAL CA: CPT | Performed by: INTERNAL MEDICINE

## 2023-01-01 PROCEDURE — NC001 PR NO CHARGE: Performed by: PHYSICIAN ASSISTANT

## 2023-01-01 PROCEDURE — 85576 BLOOD PLATELET AGGREGATION: CPT

## 2023-01-01 PROCEDURE — 84145 PROCALCITONIN (PCT): CPT | Performed by: NURSE PRACTITIONER

## 2023-01-01 PROCEDURE — 85027 COMPLETE CBC AUTOMATED: CPT | Performed by: INTERNAL MEDICINE

## 2023-01-01 PROCEDURE — 99233 SBSQ HOSP IP/OBS HIGH 50: CPT | Performed by: STUDENT IN AN ORGANIZED HEALTH CARE EDUCATION/TRAINING PROGRAM

## 2023-01-01 PROCEDURE — 90945 DIALYSIS ONE EVALUATION: CPT | Performed by: INTERNAL MEDICINE

## 2023-01-01 PROCEDURE — 85610 PROTHROMBIN TIME: CPT

## 2023-01-01 PROCEDURE — C9113 INJ PANTOPRAZOLE SODIUM, VIA: HCPCS

## 2023-01-01 PROCEDURE — 99233 SBSQ HOSP IP/OBS HIGH 50: CPT | Performed by: FAMILY MEDICINE

## 2023-01-01 PROCEDURE — 36415 COLL VENOUS BLD VENIPUNCTURE: CPT

## 2023-01-01 PROCEDURE — 93306 TTE W/DOPPLER COMPLETE: CPT | Performed by: INTERNAL MEDICINE

## 2023-01-01 PROCEDURE — 84484 ASSAY OF TROPONIN QUANT: CPT

## 2023-01-01 PROCEDURE — 84100 ASSAY OF PHOSPHORUS: CPT

## 2023-01-01 PROCEDURE — 84132 ASSAY OF SERUM POTASSIUM: CPT

## 2023-01-01 PROCEDURE — 4A133B1 MONITORING OF ARTERIAL PRESSURE, PERIPHERAL, PERCUTANEOUS APPROACH: ICD-10-PCS | Performed by: EMERGENCY MEDICINE

## 2023-01-01 PROCEDURE — 99232 SBSQ HOSP IP/OBS MODERATE 35: CPT | Performed by: INTERNAL MEDICINE

## 2023-01-01 PROCEDURE — 99223 1ST HOSP IP/OBS HIGH 75: CPT | Performed by: NURSE PRACTITIONER

## 2023-01-01 PROCEDURE — 85384 FIBRINOGEN ACTIVITY: CPT

## 2023-01-01 PROCEDURE — 82947 ASSAY GLUCOSE BLOOD QUANT: CPT

## 2023-01-01 PROCEDURE — 82330 ASSAY OF CALCIUM: CPT

## 2023-01-01 PROCEDURE — 94760 N-INVAS EAR/PLS OXIMETRY 1: CPT

## 2023-01-01 PROCEDURE — 92611 MOTION FLUOROSCOPY/SWALLOW: CPT

## 2023-01-01 PROCEDURE — 85025 COMPLETE CBC W/AUTO DIFF WBC: CPT | Performed by: NURSE PRACTITIONER

## 2023-01-01 PROCEDURE — 85018 HEMOGLOBIN: CPT

## 2023-01-01 PROCEDURE — 94669 MECHANICAL CHEST WALL OSCILL: CPT

## 2023-01-01 PROCEDURE — 83735 ASSAY OF MAGNESIUM: CPT

## 2023-01-01 PROCEDURE — 71045 X-RAY EXAM CHEST 1 VIEW: CPT

## 2023-01-01 PROCEDURE — 77001 FLUOROGUIDE FOR VEIN DEVICE: CPT

## 2023-01-01 PROCEDURE — 84443 ASSAY THYROID STIM HORMONE: CPT | Performed by: EMERGENCY MEDICINE

## 2023-01-01 PROCEDURE — 0JH63XZ INSERTION OF TUNNELED VASCULAR ACCESS DEVICE INTO CHEST SUBCUTANEOUS TISSUE AND FASCIA, PERCUTANEOUS APPROACH: ICD-10-PCS | Performed by: RADIOLOGY

## 2023-01-01 PROCEDURE — 84145 PROCALCITONIN (PCT): CPT

## 2023-01-01 PROCEDURE — 84100 ASSAY OF PHOSPHORUS: CPT | Performed by: INTERNAL MEDICINE

## 2023-01-01 PROCEDURE — 99232 SBSQ HOSP IP/OBS MODERATE 35: CPT | Performed by: FAMILY MEDICINE

## 2023-01-01 PROCEDURE — 86704 HEP B CORE ANTIBODY TOTAL: CPT | Performed by: INTERNAL MEDICINE

## 2023-01-01 PROCEDURE — 74230 X-RAY XM SWLNG FUNCJ C+: CPT

## 2023-01-01 PROCEDURE — 99291 CRITICAL CARE FIRST HOUR: CPT | Performed by: INTERNAL MEDICINE

## 2023-01-01 PROCEDURE — NC001 PR NO CHARGE: Performed by: INTERNAL MEDICINE

## 2023-01-01 PROCEDURE — 85025 COMPLETE CBC W/AUTO DIFF WBC: CPT | Performed by: EMERGENCY MEDICINE

## 2023-01-01 PROCEDURE — 82330 ASSAY OF CALCIUM: CPT | Performed by: NURSE PRACTITIONER

## 2023-01-01 PROCEDURE — 84484 ASSAY OF TROPONIN QUANT: CPT | Performed by: EMERGENCY MEDICINE

## 2023-01-01 PROCEDURE — 93005 ELECTROCARDIOGRAM TRACING: CPT

## 2023-01-01 PROCEDURE — 80048 BASIC METABOLIC PNL TOTAL CA: CPT

## 2023-01-01 PROCEDURE — 80076 HEPATIC FUNCTION PANEL: CPT

## 2023-01-01 PROCEDURE — 83605 ASSAY OF LACTIC ACID: CPT

## 2023-01-01 PROCEDURE — 83525 ASSAY OF INSULIN: CPT

## 2023-01-01 PROCEDURE — 96376 TX/PRO/DX INJ SAME DRUG ADON: CPT

## 2023-01-01 PROCEDURE — 80069 RENAL FUNCTION PANEL: CPT | Performed by: INTERNAL MEDICINE

## 2023-01-01 PROCEDURE — 36556 INSERT NON-TUNNEL CV CATH: CPT | Performed by: EMERGENCY MEDICINE

## 2023-01-01 PROCEDURE — G1004 CDSM NDSC: HCPCS

## 2023-01-01 PROCEDURE — 83605 ASSAY OF LACTIC ACID: CPT | Performed by: EMERGENCY MEDICINE

## 2023-01-01 PROCEDURE — 99223 1ST HOSP IP/OBS HIGH 75: CPT | Performed by: INTERNAL MEDICINE

## 2023-01-01 PROCEDURE — 02HV33Z INSERTION OF INFUSION DEVICE INTO SUPERIOR VENA CAVA, PERCUTANEOUS APPROACH: ICD-10-PCS | Performed by: RADIOLOGY

## 2023-01-01 PROCEDURE — 80053 COMPREHEN METABOLIC PANEL: CPT

## 2023-01-01 PROCEDURE — 80048 BASIC METABOLIC PNL TOTAL CA: CPT | Performed by: STUDENT IN AN ORGANIZED HEALTH CARE EDUCATION/TRAINING PROGRAM

## 2023-01-01 PROCEDURE — 87340 HEPATITIS B SURFACE AG IA: CPT | Performed by: INTERNAL MEDICINE

## 2023-01-01 PROCEDURE — 85730 THROMBOPLASTIN TIME PARTIAL: CPT | Performed by: INTERNAL MEDICINE

## 2023-01-01 PROCEDURE — 36558 INSERT TUNNELED CV CATH: CPT

## 2023-01-01 PROCEDURE — 94640 AIRWAY INHALATION TREATMENT: CPT

## 2023-01-01 PROCEDURE — 97167 OT EVAL HIGH COMPLEX 60 MIN: CPT

## 2023-01-01 PROCEDURE — 77001 FLUOROGUIDE FOR VEIN DEVICE: CPT | Performed by: RADIOLOGY

## 2023-01-01 PROCEDURE — NC001 PR NO CHARGE: Performed by: EMERGENCY MEDICINE

## 2023-01-01 PROCEDURE — 84145 PROCALCITONIN (PCT): CPT | Performed by: FAMILY MEDICINE

## 2023-01-01 PROCEDURE — 82550 ASSAY OF CK (CPK): CPT | Performed by: INTERNAL MEDICINE

## 2023-01-01 PROCEDURE — 85730 THROMBOPLASTIN TIME PARTIAL: CPT

## 2023-01-01 PROCEDURE — 03HY32Z INSERTION OF MONITORING DEVICE INTO UPPER ARTERY, PERCUTANEOUS APPROACH: ICD-10-PCS | Performed by: EMERGENCY MEDICINE

## 2023-01-01 PROCEDURE — 72100 X-RAY EXAM L-S SPINE 2/3 VWS: CPT

## 2023-01-01 PROCEDURE — 86900 BLOOD TYPING SEROLOGIC ABO: CPT

## 2023-01-01 PROCEDURE — 82803 BLOOD GASES ANY COMBINATION: CPT

## 2023-01-01 PROCEDURE — 76937 US GUIDE VASCULAR ACCESS: CPT

## 2023-01-01 PROCEDURE — 88305 TISSUE EXAM BY PATHOLOGIST: CPT | Performed by: PATHOLOGY

## 2023-01-01 PROCEDURE — 86706 HEP B SURFACE ANTIBODY: CPT | Performed by: INTERNAL MEDICINE

## 2023-01-01 PROCEDURE — 80053 COMPREHEN METABOLIC PANEL: CPT | Performed by: EMERGENCY MEDICINE

## 2023-01-01 PROCEDURE — 82330 ASSAY OF CALCIUM: CPT | Performed by: EMERGENCY MEDICINE

## 2023-01-01 PROCEDURE — 0DB68ZX EXCISION OF STOMACH, VIA NATURAL OR ARTIFICIAL OPENING ENDOSCOPIC, DIAGNOSTIC: ICD-10-PCS | Performed by: INTERNAL MEDICINE

## 2023-01-01 PROCEDURE — 90935 HEMODIALYSIS ONE EVALUATION: CPT | Performed by: INTERNAL MEDICINE

## 2023-01-01 PROCEDURE — 05HM33Z INSERTION OF INFUSION DEVICE INTO RIGHT INTERNAL JUGULAR VEIN, PERCUTANEOUS APPROACH: ICD-10-PCS | Performed by: EMERGENCY MEDICINE

## 2023-01-01 PROCEDURE — 72110 X-RAY EXAM L-2 SPINE 4/>VWS: CPT

## 2023-01-01 PROCEDURE — 84132 ASSAY OF SERUM POTASSIUM: CPT | Performed by: STUDENT IN AN ORGANIZED HEALTH CARE EDUCATION/TRAINING PROGRAM

## 2023-01-01 PROCEDURE — 30233R1 TRANSFUSION OF NONAUTOLOGOUS PLATELETS INTO PERIPHERAL VEIN, PERCUTANEOUS APPROACH: ICD-10-PCS | Performed by: INTERNAL MEDICINE

## 2023-01-01 PROCEDURE — 94664 DEMO&/EVAL PT USE INHALER: CPT

## 2023-01-01 PROCEDURE — 99285 EMERGENCY DEPT VISIT HI MDM: CPT

## 2023-01-01 PROCEDURE — 99152 MOD SED SAME PHYS/QHP 5/>YRS: CPT | Performed by: RADIOLOGY

## 2023-01-01 PROCEDURE — 87081 CULTURE SCREEN ONLY: CPT | Performed by: EMERGENCY MEDICINE

## 2023-01-01 PROCEDURE — 80048 BASIC METABOLIC PNL TOTAL CA: CPT | Performed by: NURSE PRACTITIONER

## 2023-01-01 PROCEDURE — 85610 PROTHROMBIN TIME: CPT | Performed by: FAMILY MEDICINE

## 2023-01-01 PROCEDURE — 99497 ADVNCD CARE PLAN 30 MIN: CPT | Performed by: STUDENT IN AN ORGANIZED HEALTH CARE EDUCATION/TRAINING PROGRAM

## 2023-01-01 PROCEDURE — 86901 BLOOD TYPING SEROLOGIC RH(D): CPT | Performed by: EMERGENCY MEDICINE

## 2023-01-01 PROCEDURE — 76937 US GUIDE VASCULAR ACCESS: CPT | Performed by: EMERGENCY MEDICINE

## 2023-01-01 PROCEDURE — 99223 1ST HOSP IP/OBS HIGH 75: CPT | Performed by: PHYSICIAN ASSISTANT

## 2023-01-01 PROCEDURE — 96367 TX/PROPH/DG ADDL SEQ IV INF: CPT

## 2023-01-01 PROCEDURE — 82805 BLOOD GASES W/O2 SATURATION: CPT | Performed by: EMERGENCY MEDICINE

## 2023-01-01 PROCEDURE — 76937 US GUIDE VASCULAR ACCESS: CPT | Performed by: RADIOLOGY

## 2023-01-01 PROCEDURE — 86901 BLOOD TYPING SEROLOGIC RH(D): CPT

## 2023-01-01 PROCEDURE — 99291 CRITICAL CARE FIRST HOUR: CPT | Performed by: STUDENT IN AN ORGANIZED HEALTH CARE EDUCATION/TRAINING PROGRAM

## 2023-01-01 PROCEDURE — 85347 COAGULATION TIME ACTIVATED: CPT

## 2023-01-01 PROCEDURE — 86923 COMPATIBILITY TEST ELECTRIC: CPT

## 2023-01-01 PROCEDURE — 80048 BASIC METABOLIC PNL TOTAL CA: CPT | Performed by: FAMILY MEDICINE

## 2023-01-01 PROCEDURE — 85014 HEMATOCRIT: CPT

## 2023-01-01 PROCEDURE — 85049 AUTOMATED PLATELET COUNT: CPT

## 2023-01-01 PROCEDURE — 85610 PROTHROMBIN TIME: CPT | Performed by: INTERNAL MEDICINE

## 2023-01-01 PROCEDURE — 97530 THERAPEUTIC ACTIVITIES: CPT

## 2023-01-01 PROCEDURE — 83540 ASSAY OF IRON: CPT | Performed by: INTERNAL MEDICINE

## 2023-01-01 PROCEDURE — 82805 BLOOD GASES W/O2 SATURATION: CPT

## 2023-01-01 PROCEDURE — 97163 PT EVAL HIGH COMPLEX 45 MIN: CPT

## 2023-01-01 PROCEDURE — 83605 ASSAY OF LACTIC ACID: CPT | Performed by: INTERNAL MEDICINE

## 2023-01-01 PROCEDURE — 85027 COMPLETE CBC AUTOMATED: CPT | Performed by: FAMILY MEDICINE

## 2023-01-01 PROCEDURE — NC001 PR NO CHARGE

## 2023-01-01 PROCEDURE — 4A133J1 MONITORING OF ARTERIAL PULSE, PERIPHERAL, PERCUTANEOUS APPROACH: ICD-10-PCS | Performed by: EMERGENCY MEDICINE

## 2023-01-01 PROCEDURE — 85027 COMPLETE CBC AUTOMATED: CPT

## 2023-01-01 PROCEDURE — 99232 SBSQ HOSP IP/OBS MODERATE 35: CPT | Performed by: NURSE PRACTITIONER

## 2023-01-01 PROCEDURE — 99152 MOD SED SAME PHYS/QHP 5/>YRS: CPT

## 2023-01-01 PROCEDURE — 80202 ASSAY OF VANCOMYCIN: CPT | Performed by: INTERNAL MEDICINE

## 2023-01-01 PROCEDURE — 84100 ASSAY OF PHOSPHORUS: CPT | Performed by: NURSE PRACTITIONER

## 2023-01-01 PROCEDURE — 86900 BLOOD TYPING SEROLOGIC ABO: CPT | Performed by: EMERGENCY MEDICINE

## 2023-01-01 PROCEDURE — 83735 ASSAY OF MAGNESIUM: CPT | Performed by: NURSE PRACTITIONER

## 2023-01-01 PROCEDURE — 87077 CULTURE AEROBIC IDENTIFY: CPT

## 2023-01-01 PROCEDURE — 80048 BASIC METABOLIC PNL TOTAL CA: CPT | Performed by: PHYSICIAN ASSISTANT

## 2023-01-01 PROCEDURE — 82607 VITAMIN B-12: CPT | Performed by: INTERNAL MEDICINE

## 2023-01-01 PROCEDURE — 5A1D70Z PERFORMANCE OF URINARY FILTRATION, INTERMITTENT, LESS THAN 6 HOURS PER DAY: ICD-10-PCS | Performed by: INTERNAL MEDICINE

## 2023-01-01 PROCEDURE — 82728 ASSAY OF FERRITIN: CPT | Performed by: INTERNAL MEDICINE

## 2023-01-01 PROCEDURE — 83690 ASSAY OF LIPASE: CPT | Performed by: EMERGENCY MEDICINE

## 2023-01-01 PROCEDURE — 84681 ASSAY OF C-PEPTIDE: CPT

## 2023-01-01 PROCEDURE — C1750 CATH, HEMODIALYSIS,LONG-TERM: HCPCS

## 2023-01-01 PROCEDURE — 74176 CT ABD & PELVIS W/O CONTRAST: CPT

## 2023-01-01 PROCEDURE — 92610 EVALUATE SWALLOWING FUNCTION: CPT

## 2023-01-01 PROCEDURE — 85397 CLOTTING FUNCT ACTIVITY: CPT

## 2023-01-01 PROCEDURE — 82746 ASSAY OF FOLIC ACID SERUM: CPT | Performed by: INTERNAL MEDICINE

## 2023-01-01 PROCEDURE — 86850 RBC ANTIBODY SCREEN: CPT

## 2023-01-01 PROCEDURE — 99233 SBSQ HOSP IP/OBS HIGH 50: CPT | Performed by: NURSE PRACTITIONER

## 2023-01-01 PROCEDURE — 83036 HEMOGLOBIN GLYCOSYLATED A1C: CPT | Performed by: EMERGENCY MEDICINE

## 2023-01-01 PROCEDURE — 02HV33Z INSERTION OF INFUSION DEVICE INTO SUPERIOR VENA CAVA, PERCUTANEOUS APPROACH: ICD-10-PCS | Performed by: INTERNAL MEDICINE

## 2023-01-01 PROCEDURE — 96375 TX/PRO/DX INJ NEW DRUG ADDON: CPT

## 2023-01-01 PROCEDURE — 85025 COMPLETE CBC W/AUTO DIFF WBC: CPT | Performed by: INTERNAL MEDICINE

## 2023-01-01 PROCEDURE — 99239 HOSP IP/OBS DSCHRG MGMT >30: CPT | Performed by: INTERNAL MEDICINE

## 2023-01-01 PROCEDURE — 97535 SELF CARE MNGMENT TRAINING: CPT

## 2023-01-01 PROCEDURE — 99222 1ST HOSP IP/OBS MODERATE 55: CPT | Performed by: INTERNAL MEDICINE

## 2023-01-01 PROCEDURE — P9037 PLATE PHERES LEUKOREDU IRRAD: HCPCS

## 2023-01-01 PROCEDURE — 30233N1 TRANSFUSION OF NONAUTOLOGOUS RED BLOOD CELLS INTO PERIPHERAL VEIN, PERCUTANEOUS APPROACH: ICD-10-PCS | Performed by: STUDENT IN AN ORGANIZED HEALTH CARE EDUCATION/TRAINING PROGRAM

## 2023-01-01 PROCEDURE — 93306 TTE W/DOPPLER COMPLETE: CPT

## 2023-01-01 PROCEDURE — 85007 BL SMEAR W/DIFF WBC COUNT: CPT

## 2023-01-01 PROCEDURE — 36558 INSERT TUNNELED CV CATH: CPT | Performed by: RADIOLOGY

## 2023-01-01 PROCEDURE — 30233N1 TRANSFUSION OF NONAUTOLOGOUS RED BLOOD CELLS INTO PERIPHERAL VEIN, PERCUTANEOUS APPROACH: ICD-10-PCS | Performed by: INTERNAL MEDICINE

## 2023-01-01 PROCEDURE — 85610 PROTHROMBIN TIME: CPT | Performed by: EMERGENCY MEDICINE

## 2023-01-01 PROCEDURE — 43239 EGD BIOPSY SINGLE/MULTIPLE: CPT | Performed by: INTERNAL MEDICINE

## 2023-01-01 PROCEDURE — 86803 HEPATITIS C AB TEST: CPT | Performed by: INTERNAL MEDICINE

## 2023-01-01 PROCEDURE — 96365 THER/PROPH/DIAG IV INF INIT: CPT

## 2023-01-01 PROCEDURE — 84100 ASSAY OF PHOSPHORUS: CPT | Performed by: EMERGENCY MEDICINE

## 2023-01-01 PROCEDURE — 36620 INSERTION CATHETER ARTERY: CPT | Performed by: EMERGENCY MEDICINE

## 2023-01-01 PROCEDURE — 72131 CT LUMBAR SPINE W/O DYE: CPT

## 2023-01-01 PROCEDURE — 06HY33Z INSERTION OF INFUSION DEVICE INTO LOWER VEIN, PERCUTANEOUS APPROACH: ICD-10-PCS | Performed by: EMERGENCY MEDICINE

## 2023-01-01 PROCEDURE — 85610 PROTHROMBIN TIME: CPT | Performed by: PHYSICIAN ASSISTANT

## 2023-01-01 PROCEDURE — 87040 BLOOD CULTURE FOR BACTERIA: CPT

## 2023-01-01 PROCEDURE — 94668 MNPJ CHEST WALL SBSQ: CPT

## 2023-01-01 PROCEDURE — NC001 PR NO CHARGE: Performed by: RADIOLOGY

## 2023-01-01 PROCEDURE — 86705 HEP B CORE ANTIBODY IGM: CPT | Performed by: INTERNAL MEDICINE

## 2023-01-01 PROCEDURE — P9016 RBC LEUKOCYTES REDUCED: HCPCS

## 2023-01-01 PROCEDURE — 81001 URINALYSIS AUTO W/SCOPE: CPT | Performed by: FAMILY MEDICINE

## 2023-01-01 PROCEDURE — 87186 SC STD MICRODIL/AGAR DIL: CPT

## 2023-01-01 PROCEDURE — 5A1D90Z PERFORMANCE OF URINARY FILTRATION, CONTINUOUS, GREATER THAN 18 HOURS PER DAY: ICD-10-PCS | Performed by: INTERNAL MEDICINE

## 2023-01-01 PROCEDURE — 83605 ASSAY OF LACTIC ACID: CPT | Performed by: FAMILY MEDICINE

## 2023-01-01 PROCEDURE — 87040 BLOOD CULTURE FOR BACTERIA: CPT | Performed by: FAMILY MEDICINE

## 2023-01-01 PROCEDURE — 81001 URINALYSIS AUTO W/SCOPE: CPT

## 2023-01-01 PROCEDURE — C1894 INTRO/SHEATH, NON-LASER: HCPCS

## 2023-01-01 PROCEDURE — 80375 DRUG/SUBSTANCE NOS 1-3: CPT | Performed by: INTERNAL MEDICINE

## 2023-01-01 PROCEDURE — 83550 IRON BINDING TEST: CPT | Performed by: INTERNAL MEDICINE

## 2023-01-01 PROCEDURE — 87086 URINE CULTURE/COLONY COUNT: CPT

## 2023-01-01 RX ORDER — HYDROMORPHONE HCL IN WATER/PF 6 MG/30 ML
0.2 PATIENT CONTROLLED ANALGESIA SYRINGE INTRAVENOUS
Status: DISCONTINUED | OUTPATIENT
Start: 2023-01-01 | End: 2023-01-01

## 2023-01-01 RX ORDER — HYDROMORPHONE HCL IN WATER/PF 6 MG/30 ML
0.2 PATIENT CONTROLLED ANALGESIA SYRINGE INTRAVENOUS ONCE
Status: COMPLETED | OUTPATIENT
Start: 2023-01-01 | End: 2023-01-01

## 2023-01-01 RX ORDER — PANTOPRAZOLE SODIUM 40 MG/1
40 TABLET, DELAYED RELEASE ORAL
Status: DISCONTINUED | OUTPATIENT
Start: 2023-01-01 | End: 2023-01-01

## 2023-01-01 RX ORDER — LORAZEPAM 2 MG/ML
1 INJECTION INTRAMUSCULAR
Status: DISCONTINUED | OUTPATIENT
Start: 2023-01-01 | End: 2023-01-01 | Stop reason: HOSPADM

## 2023-01-01 RX ORDER — INSULIN GLARGINE 100 [IU]/ML
10 INJECTION, SOLUTION SUBCUTANEOUS
Status: DISCONTINUED | OUTPATIENT
Start: 2023-01-01 | End: 2023-01-01

## 2023-01-01 RX ORDER — SENNOSIDES 8.6 MG
2 TABLET ORAL
Status: DISCONTINUED | OUTPATIENT
Start: 2023-01-01 | End: 2023-01-01

## 2023-01-01 RX ORDER — CHOLECALCIFEROL (VITAMIN D3) 10(400)/ML
1000 DROPS ORAL DAILY
Status: DISCONTINUED | OUTPATIENT
Start: 2023-01-01 | End: 2023-01-01

## 2023-01-01 RX ORDER — CALCIUM GLUCONATE 20 MG/ML
1 INJECTION, SOLUTION INTRAVENOUS ONCE
Status: DISCONTINUED | OUTPATIENT
Start: 2023-01-01 | End: 2023-01-01 | Stop reason: SDUPTHER

## 2023-01-01 RX ORDER — METHYLPREDNISOLONE SODIUM SUCCINATE 40 MG/ML
20 INJECTION, POWDER, LYOPHILIZED, FOR SOLUTION INTRAMUSCULAR; INTRAVENOUS EVERY 12 HOURS SCHEDULED
Status: DISCONTINUED | OUTPATIENT
Start: 2023-01-01 | End: 2023-01-01

## 2023-01-01 RX ORDER — CALCIUM GLUCONATE 20 MG/ML
1 INJECTION, SOLUTION INTRAVENOUS ONCE
Status: COMPLETED | OUTPATIENT
Start: 2023-01-01 | End: 2023-01-01

## 2023-01-01 RX ORDER — HEPARIN SODIUM 1000 [USP'U]/ML
4000 INJECTION, SOLUTION INTRAVENOUS; SUBCUTANEOUS EVERY 6 HOURS PRN
Status: DISCONTINUED | OUTPATIENT
Start: 2023-01-01 | End: 2023-01-01

## 2023-01-01 RX ORDER — HYDROMORPHONE HCL/PF 1 MG/ML
0.5 SYRINGE (ML) INJECTION ONCE
Status: COMPLETED | OUTPATIENT
Start: 2023-01-01 | End: 2023-01-01

## 2023-01-01 RX ORDER — DEXTROSE MONOHYDRATE 25 G/50ML
25 INJECTION, SOLUTION INTRAVENOUS ONCE
Status: COMPLETED | OUTPATIENT
Start: 2023-01-01 | End: 2023-01-01

## 2023-01-01 RX ORDER — SODIUM CHLORIDE, SODIUM GLUCONATE, SODIUM ACETATE, POTASSIUM CHLORIDE, MAGNESIUM CHLORIDE, SODIUM PHOSPHATE, DIBASIC, AND POTASSIUM PHOSPHATE .53; .5; .37; .037; .03; .012; .00082 G/100ML; G/100ML; G/100ML; G/100ML; G/100ML; G/100ML; G/100ML
500 INJECTION, SOLUTION INTRAVENOUS ONCE
Status: COMPLETED | OUTPATIENT
Start: 2023-01-01 | End: 2023-01-01

## 2023-01-01 RX ORDER — HYDROMORPHONE HCL IN WATER/PF 6 MG/30 ML
0.2 PATIENT CONTROLLED ANALGESIA SYRINGE INTRAVENOUS EVERY 4 HOURS PRN
Status: DISCONTINUED | OUTPATIENT
Start: 2023-01-01 | End: 2023-01-01

## 2023-01-01 RX ORDER — HEPARIN SODIUM 5000 [USP'U]/ML
5000 INJECTION, SOLUTION INTRAVENOUS; SUBCUTANEOUS EVERY 8 HOURS SCHEDULED
Status: DISCONTINUED | OUTPATIENT
Start: 2023-01-01 | End: 2023-01-01

## 2023-01-01 RX ORDER — FUROSEMIDE 10 MG/ML
60 INJECTION INTRAMUSCULAR; INTRAVENOUS ONCE
Status: COMPLETED | OUTPATIENT
Start: 2023-01-01 | End: 2023-01-01

## 2023-01-01 RX ORDER — OXYCODONE HCL 5 MG/5 ML
5 SOLUTION, ORAL ORAL EVERY 4 HOURS PRN
Status: DISCONTINUED | OUTPATIENT
Start: 2023-01-01 | End: 2023-01-01

## 2023-01-01 RX ORDER — INSULIN LISPRO 100 [IU]/ML
2-12 INJECTION, SOLUTION INTRAVENOUS; SUBCUTANEOUS
Status: DISCONTINUED | OUTPATIENT
Start: 2023-01-01 | End: 2023-01-01

## 2023-01-01 RX ORDER — FENTANYL CITRATE-0.9 % NACL/PF 10 MCG/ML
100 PLASTIC BAG, INJECTION (ML) INTRAVENOUS CONTINUOUS
Status: DISCONTINUED | OUTPATIENT
Start: 2023-01-01 | End: 2023-01-01

## 2023-01-01 RX ORDER — VANCOMYCIN HYDROCHLORIDE 1 G/200ML
10 INJECTION, SOLUTION INTRAVENOUS EVERY 12 HOURS
Status: DISCONTINUED | OUTPATIENT
Start: 2023-01-01 | End: 2023-01-01

## 2023-01-01 RX ORDER — METOPROLOL TARTRATE 5 MG/5ML
2.5 INJECTION INTRAVENOUS EVERY 6 HOURS
Status: DISCONTINUED | OUTPATIENT
Start: 2023-01-01 | End: 2023-01-01

## 2023-01-01 RX ORDER — PANTOPRAZOLE SODIUM 40 MG/10ML
40 INJECTION, POWDER, LYOPHILIZED, FOR SOLUTION INTRAVENOUS
Status: DISCONTINUED | OUTPATIENT
Start: 2023-01-01 | End: 2023-01-01

## 2023-01-01 RX ORDER — INSULIN LISPRO 100 [IU]/ML
2-12 INJECTION, SOLUTION INTRAVENOUS; SUBCUTANEOUS EVERY 6 HOURS SCHEDULED
Status: DISCONTINUED | OUTPATIENT
Start: 2023-01-01 | End: 2023-01-01

## 2023-01-01 RX ORDER — MAGNESIUM SULFATE HEPTAHYDRATE 40 MG/ML
4 INJECTION, SOLUTION INTRAVENOUS ONCE
Status: COMPLETED | OUTPATIENT
Start: 2023-01-01 | End: 2023-01-01

## 2023-01-01 RX ORDER — HYDROMORPHONE HCL/PF 1 MG/ML
0.5 SYRINGE (ML) INJECTION EVERY 2 HOUR PRN
Status: DISCONTINUED | OUTPATIENT
Start: 2023-01-01 | End: 2023-01-01 | Stop reason: HOSPADM

## 2023-01-01 RX ORDER — CALCIUM CHLORIDE 100 MG/ML
1 INJECTION INTRAVENOUS; INTRAVENTRICULAR ONCE
Status: COMPLETED | OUTPATIENT
Start: 2023-01-01 | End: 2023-01-01

## 2023-01-01 RX ORDER — MIDAZOLAM HYDROCHLORIDE 2 MG/2ML
0.5 INJECTION, SOLUTION INTRAMUSCULAR; INTRAVENOUS ONCE
Status: COMPLETED | OUTPATIENT
Start: 2023-01-01 | End: 2023-01-01

## 2023-01-01 RX ORDER — MIDAZOLAM HYDROCHLORIDE 2 MG/2ML
INJECTION, SOLUTION INTRAMUSCULAR; INTRAVENOUS
Status: COMPLETED
Start: 2023-01-01 | End: 2023-01-01

## 2023-01-01 RX ORDER — INSULIN LISPRO 100 [IU]/ML
10 INJECTION, SOLUTION INTRAVENOUS; SUBCUTANEOUS ONCE
Status: DISCONTINUED | OUTPATIENT
Start: 2023-01-01 | End: 2023-01-01

## 2023-01-01 RX ORDER — CHLORHEXIDINE GLUCONATE 0.12 MG/ML
15 RINSE ORAL EVERY 12 HOURS SCHEDULED
Status: DISCONTINUED | OUTPATIENT
Start: 2023-01-01 | End: 2023-01-01

## 2023-01-01 RX ORDER — HYDROMORPHONE HCL/PF 1 MG/ML
0.3 SYRINGE (ML) INJECTION EVERY 2 HOUR PRN
Status: DISCONTINUED | OUTPATIENT
Start: 2023-01-01 | End: 2023-01-01

## 2023-01-01 RX ORDER — MAGNESIUM SULFATE 1 G/100ML
1 INJECTION INTRAVENOUS ONCE
Status: COMPLETED | OUTPATIENT
Start: 2023-01-01 | End: 2023-01-01

## 2023-01-01 RX ORDER — ALBUTEROL SULFATE 2.5 MG/3ML
SOLUTION RESPIRATORY (INHALATION)
Status: COMPLETED
Start: 2023-01-01 | End: 2023-01-01

## 2023-01-01 RX ORDER — FENTANYL CITRATE 50 UG/ML
25 INJECTION, SOLUTION INTRAMUSCULAR; INTRAVENOUS ONCE
Status: COMPLETED | OUTPATIENT
Start: 2023-01-01 | End: 2023-01-01

## 2023-01-01 RX ORDER — FENTANYL CITRATE 50 UG/ML
INJECTION, SOLUTION INTRAMUSCULAR; INTRAVENOUS AS NEEDED
Status: COMPLETED | OUTPATIENT
Start: 2023-01-01 | End: 2023-01-01

## 2023-01-01 RX ORDER — FUROSEMIDE 10 MG/ML
80 INJECTION INTRAMUSCULAR; INTRAVENOUS ONCE
Status: DISCONTINUED | OUTPATIENT
Start: 2023-01-01 | End: 2023-01-01

## 2023-01-01 RX ORDER — PANTOPRAZOLE SODIUM 20 MG/1
20 TABLET, DELAYED RELEASE ORAL
Status: DISCONTINUED | OUTPATIENT
Start: 2023-01-01 | End: 2023-01-01

## 2023-01-01 RX ORDER — HYDROMORPHONE HCL/PF 1 MG/ML
0.5 SYRINGE (ML) INJECTION EVERY 4 HOURS PRN
Status: DISCONTINUED | OUTPATIENT
Start: 2023-01-01 | End: 2023-01-01

## 2023-01-01 RX ORDER — LEVALBUTEROL INHALATION SOLUTION 1.25 MG/3ML
1.25 SOLUTION RESPIRATORY (INHALATION)
Status: DISCONTINUED | OUTPATIENT
Start: 2023-01-01 | End: 2023-01-01

## 2023-01-01 RX ORDER — SODIUM CHLORIDE, SODIUM GLUCONATE, SODIUM ACETATE, POTASSIUM CHLORIDE, MAGNESIUM CHLORIDE, SODIUM PHOSPHATE, DIBASIC, AND POTASSIUM PHOSPHATE .53; .5; .37; .037; .03; .012; .00082 G/100ML; G/100ML; G/100ML; G/100ML; G/100ML; G/100ML; G/100ML
1000 INJECTION, SOLUTION INTRAVENOUS ONCE
Status: COMPLETED | OUTPATIENT
Start: 2023-01-01 | End: 2023-01-01

## 2023-01-01 RX ORDER — BUMETANIDE 0.25 MG/ML
0.5 INJECTION INTRAMUSCULAR; INTRAVENOUS CONTINUOUS
Status: DISCONTINUED | OUTPATIENT
Start: 2023-01-01 | End: 2023-01-01

## 2023-01-01 RX ORDER — HEPARIN SODIUM 10000 [USP'U]/100ML
3-20 INJECTION, SOLUTION INTRAVENOUS
Status: DISCONTINUED | OUTPATIENT
Start: 2023-01-01 | End: 2023-01-01

## 2023-01-01 RX ORDER — NOREPINEPHRINE BITARTRATE 1 MG/ML
INJECTION, SOLUTION INTRAVENOUS
Status: COMPLETED
Start: 2023-01-01 | End: 2023-01-01

## 2023-01-01 RX ORDER — MAGNESIUM SULFATE HEPTAHYDRATE 40 MG/ML
2 INJECTION, SOLUTION INTRAVENOUS ONCE
Status: DISCONTINUED | OUTPATIENT
Start: 2023-01-01 | End: 2023-01-01

## 2023-01-01 RX ORDER — INSULIN LISPRO 100 [IU]/ML
2-12 INJECTION, SOLUTION INTRAVENOUS; SUBCUTANEOUS EVERY 6 HOURS
Status: DISCONTINUED | OUTPATIENT
Start: 2023-01-01 | End: 2023-01-01

## 2023-01-01 RX ORDER — ALBUTEROL SULFATE 2.5 MG/3ML
5 SOLUTION RESPIRATORY (INHALATION) ONCE
Status: COMPLETED | OUTPATIENT
Start: 2023-01-01 | End: 2023-01-01

## 2023-01-01 RX ORDER — ONDANSETRON 2 MG/ML
4 INJECTION INTRAMUSCULAR; INTRAVENOUS EVERY 6 HOURS PRN
Status: DISCONTINUED | OUTPATIENT
Start: 2023-01-01 | End: 2023-01-01

## 2023-01-01 RX ORDER — SODIUM CHLORIDE 9 MG/ML
50 INJECTION, SOLUTION INTRAVENOUS CONTINUOUS
Status: DISCONTINUED | OUTPATIENT
Start: 2023-01-01 | End: 2023-01-01

## 2023-01-01 RX ORDER — FENTANYL CITRATE 50 UG/ML
INJECTION, SOLUTION INTRAMUSCULAR; INTRAVENOUS
Status: COMPLETED
Start: 2023-01-01 | End: 2023-01-01

## 2023-01-01 RX ORDER — LIDOCAINE HYDROCHLORIDE AND EPINEPHRINE 10; 10 MG/ML; UG/ML
INJECTION, SOLUTION INFILTRATION; PERINEURAL AS NEEDED
Status: COMPLETED | OUTPATIENT
Start: 2023-01-01 | End: 2023-01-01

## 2023-01-01 RX ORDER — GLYCOPYRROLATE 0.2 MG/ML
0.1 INJECTION INTRAMUSCULAR; INTRAVENOUS EVERY 4 HOURS PRN
Status: DISCONTINUED | OUTPATIENT
Start: 2023-01-01 | End: 2023-01-01 | Stop reason: HOSPADM

## 2023-01-01 RX ORDER — PROPOFOL 10 MG/ML
5-50 INJECTION, EMULSION INTRAVENOUS
Status: DISCONTINUED | OUTPATIENT
Start: 2023-01-01 | End: 2023-01-01

## 2023-01-01 RX ORDER — PANTOPRAZOLE SODIUM 40 MG/10ML
40 INJECTION, POWDER, LYOPHILIZED, FOR SOLUTION INTRAVENOUS EVERY 12 HOURS SCHEDULED
Status: DISCONTINUED | OUTPATIENT
Start: 2023-01-01 | End: 2023-01-01

## 2023-01-01 RX ORDER — HYDROMORPHONE HCL/PF 1 MG/ML
1 SYRINGE (ML) INJECTION EVERY 4 HOURS PRN
Status: DISCONTINUED | OUTPATIENT
Start: 2023-01-01 | End: 2023-01-01

## 2023-01-01 RX ORDER — CALCIUM GLUCONATE 20 MG/ML
2 INJECTION, SOLUTION INTRAVENOUS ONCE
Status: COMPLETED | OUTPATIENT
Start: 2023-01-01 | End: 2023-01-01

## 2023-01-01 RX ORDER — BISACODYL 10 MG
10 SUPPOSITORY, RECTAL RECTAL DAILY
Status: DISCONTINUED | OUTPATIENT
Start: 2023-01-01 | End: 2023-01-01

## 2023-01-01 RX ORDER — FUROSEMIDE 10 MG/ML
40 INJECTION INTRAMUSCULAR; INTRAVENOUS DAILY
Status: COMPLETED | OUTPATIENT
Start: 2023-01-01 | End: 2023-01-01

## 2023-01-01 RX ORDER — ACETAMINOPHEN 160 MG/5ML
650 SUSPENSION ORAL EVERY 4 HOURS PRN
Status: DISCONTINUED | OUTPATIENT
Start: 2023-01-01 | End: 2023-01-01

## 2023-01-01 RX ORDER — ACETAMINOPHEN 325 MG/1
488 TABLET ORAL EVERY 6 HOURS PRN
Status: DISCONTINUED | OUTPATIENT
Start: 2023-01-01 | End: 2023-01-01

## 2023-01-01 RX ORDER — HEPARIN SODIUM 1000 [USP'U]/ML
2000 INJECTION, SOLUTION INTRAVENOUS; SUBCUTANEOUS EVERY 6 HOURS PRN
Status: DISCONTINUED | OUTPATIENT
Start: 2023-01-01 | End: 2023-01-01

## 2023-01-01 RX ORDER — GLYCOPYRROLATE 0.2 MG/ML
0.2 INJECTION INTRAMUSCULAR; INTRAVENOUS ONCE
Status: COMPLETED | OUTPATIENT
Start: 2023-01-01 | End: 2023-01-01

## 2023-01-01 RX ORDER — SODIUM CHLORIDE FOR INHALATION 3 %
4 VIAL, NEBULIZER (ML) INHALATION
Status: DISCONTINUED | OUTPATIENT
Start: 2023-01-01 | End: 2023-01-01

## 2023-01-01 RX ORDER — HYDROMORPHONE HCL IN WATER/PF 6 MG/30 ML
0.2 PATIENT CONTROLLED ANALGESIA SYRINGE INTRAVENOUS EVERY 2 HOUR PRN
Status: DISCONTINUED | OUTPATIENT
Start: 2023-01-01 | End: 2023-01-01

## 2023-01-01 RX ORDER — DEXTROSE MONOHYDRATE 25 G/50ML
INJECTION, SOLUTION INTRAVENOUS
Status: COMPLETED
Start: 2023-01-01 | End: 2023-01-01

## 2023-01-01 RX ORDER — POLYETHYLENE GLYCOL 3350 17 G/17G
17 POWDER, FOR SOLUTION ORAL DAILY
Status: DISCONTINUED | OUTPATIENT
Start: 2023-01-01 | End: 2023-01-01

## 2023-01-01 RX ORDER — FUROSEMIDE 10 MG/ML
40 INJECTION INTRAMUSCULAR; INTRAVENOUS DAILY
Status: DISCONTINUED | OUTPATIENT
Start: 2023-01-01 | End: 2023-01-01

## 2023-01-01 RX ORDER — HEPARIN SODIUM 5000 [USP'U]/ML
7500 INJECTION, SOLUTION INTRAVENOUS; SUBCUTANEOUS EVERY 8 HOURS SCHEDULED
Status: DISCONTINUED | OUTPATIENT
Start: 2023-01-01 | End: 2023-01-01

## 2023-01-01 RX ORDER — MIDAZOLAM HYDROCHLORIDE 2 MG/2ML
INJECTION, SOLUTION INTRAMUSCULAR; INTRAVENOUS AS NEEDED
Status: COMPLETED | OUTPATIENT
Start: 2023-01-01 | End: 2023-01-01

## 2023-01-01 RX ORDER — OXYCODONE HCL 5 MG/5 ML
7.5 SOLUTION, ORAL ORAL EVERY 4 HOURS PRN
Status: DISCONTINUED | OUTPATIENT
Start: 2023-01-01 | End: 2023-01-01

## 2023-01-01 RX ORDER — GABAPENTIN 300 MG/1
300 CAPSULE ORAL
Status: DISCONTINUED | OUTPATIENT
Start: 2023-01-01 | End: 2023-01-01

## 2023-01-01 RX ORDER — LANOLIN ALCOHOL/MO/W.PET/CERES
6 CREAM (GRAM) TOPICAL
Status: DISCONTINUED | OUTPATIENT
Start: 2023-01-01 | End: 2023-01-01

## 2023-01-01 RX ORDER — LEVALBUTEROL INHALATION SOLUTION 1.25 MG/3ML
SOLUTION RESPIRATORY (INHALATION)
Status: DISPENSED
Start: 2023-01-01 | End: 2023-01-01

## 2023-01-01 RX ORDER — SOTALOL HYDROCHLORIDE 80 MG/1
80 TABLET ORAL DAILY
Status: DISCONTINUED | OUTPATIENT
Start: 2023-01-01 | End: 2023-01-01

## 2023-01-01 RX ORDER — ACETAMINOPHEN 325 MG/1
650 TABLET ORAL EVERY 6 HOURS PRN
Status: DISCONTINUED | OUTPATIENT
Start: 2023-01-01 | End: 2023-01-01

## 2023-01-01 RX ORDER — ACETAMINOPHEN 160 MG/5ML
650 SUSPENSION ORAL EVERY 6 HOURS
Status: DISCONTINUED | OUTPATIENT
Start: 2023-01-01 | End: 2023-01-01

## 2023-01-01 RX ORDER — POTASSIUM CHLORIDE 14.9 MG/ML
20 INJECTION INTRAVENOUS
Status: COMPLETED | OUTPATIENT
Start: 2023-01-01 | End: 2023-01-01

## 2023-01-01 RX ORDER — DEXTROSE MONOHYDRATE 25 G/50ML
25 INJECTION, SOLUTION INTRAVENOUS ONCE
Status: DISCONTINUED | OUTPATIENT
Start: 2023-01-01 | End: 2023-01-01

## 2023-01-01 RX ORDER — ALBUTEROL SULFATE 2.5 MG/3ML
2.5 SOLUTION RESPIRATORY (INHALATION) EVERY 4 HOURS PRN
Status: DISCONTINUED | OUTPATIENT
Start: 2023-01-01 | End: 2023-01-01

## 2023-01-01 RX ORDER — HEPARIN SODIUM 10000 [USP'U]/100ML
400 INJECTION, SOLUTION INTRAVENOUS CONTINUOUS
Status: DISCONTINUED | OUTPATIENT
Start: 2023-01-01 | End: 2023-01-01

## 2023-01-01 RX ORDER — LIDOCAINE 40 MG/G
CREAM TOPICAL 4 TIMES DAILY PRN
Status: DISCONTINUED | OUTPATIENT
Start: 2023-01-01 | End: 2023-01-01 | Stop reason: HOSPADM

## 2023-01-01 RX ORDER — LIDOCAINE 50 MG/G
1 PATCH TOPICAL DAILY
Status: DISCONTINUED | OUTPATIENT
Start: 2023-01-01 | End: 2023-01-01

## 2023-01-01 RX ORDER — NYSTATIN 100000 [USP'U]/G
POWDER TOPICAL 2 TIMES DAILY
Status: DISCONTINUED | OUTPATIENT
Start: 2023-01-01 | End: 2023-01-01

## 2023-01-01 RX ORDER — HALOPERIDOL 5 MG/ML
0.5 INJECTION INTRAMUSCULAR EVERY 2 HOUR PRN
Status: DISCONTINUED | OUTPATIENT
Start: 2023-01-01 | End: 2023-01-01 | Stop reason: HOSPADM

## 2023-01-01 RX ORDER — TORSEMIDE 20 MG/1
40 TABLET ORAL ONCE
Status: DISCONTINUED | OUTPATIENT
Start: 2023-01-01 | End: 2023-01-01

## 2023-01-01 RX ORDER — FLUDROCORTISONE ACETATE 0.1 MG/1
0.05 TABLET ORAL DAILY
Status: DISCONTINUED | OUTPATIENT
Start: 2023-01-01 | End: 2023-01-01

## 2023-01-01 RX ORDER — DIPHENHYDRAMINE HYDROCHLORIDE AND LIDOCAINE HYDROCHLORIDE AND ALUMINUM HYDROXIDE AND MAGNESIUM HYDRO
10 KIT EVERY 6 HOURS PRN
Status: DISCONTINUED | OUTPATIENT
Start: 2023-01-01 | End: 2023-01-01

## 2023-01-01 RX ORDER — MAGNESIUM SULFATE 1 G/100ML
1 INJECTION INTRAVENOUS ONCE
Status: DISCONTINUED | OUTPATIENT
Start: 2023-01-01 | End: 2023-01-01

## 2023-01-01 RX ORDER — HYDROMORPHONE HCL/PF 1 MG/ML
0.5 SYRINGE (ML) INJECTION EVERY 2 HOUR PRN
Status: DISCONTINUED | OUTPATIENT
Start: 2023-01-01 | End: 2023-01-01

## 2023-01-01 RX ORDER — MAGNESIUM SULFATE HEPTAHYDRATE 40 MG/ML
2 INJECTION, SOLUTION INTRAVENOUS ONCE
Status: COMPLETED | OUTPATIENT
Start: 2023-01-01 | End: 2023-01-01

## 2023-01-01 RX ADMIN — PANTOPRAZOLE SODIUM 40 MG: 40 INJECTION, POWDER, FOR SOLUTION INTRAVENOUS at 12:12

## 2023-01-01 RX ADMIN — LIDOCAINE 5% 1 PATCH: 700 PATCH TOPICAL at 09:04

## 2023-01-01 RX ADMIN — HEPARIN SODIUM 5000 UNITS: 5000 INJECTION INTRAVENOUS; SUBCUTANEOUS at 20:47

## 2023-01-01 RX ADMIN — FUROSEMIDE 40 MG: 10 INJECTION, SOLUTION INTRAMUSCULAR; INTRAVENOUS at 08:16

## 2023-01-01 RX ADMIN — CALCIUM GLUCONATE 2 G: 20 INJECTION, SOLUTION INTRAVENOUS at 05:45

## 2023-01-01 RX ADMIN — HYDROMORPHONE HYDROCHLORIDE 0.5 MG: 1 INJECTION, SOLUTION INTRAMUSCULAR; INTRAVENOUS; SUBCUTANEOUS at 12:24

## 2023-01-01 RX ADMIN — HEPARIN SODIUM 5000 UNITS: 5000 INJECTION INTRAVENOUS; SUBCUTANEOUS at 13:23

## 2023-01-01 RX ADMIN — NOREPINEPHRINE BITARTRATE 13 MCG/MIN: 1 INJECTION, SOLUTION, CONCENTRATE INTRAVENOUS at 17:20

## 2023-01-01 RX ADMIN — CEFEPIME 1000 MG: 1 INJECTION, POWDER, FOR SOLUTION INTRAMUSCULAR; INTRAVENOUS at 19:23

## 2023-01-01 RX ADMIN — PANTOPRAZOLE SODIUM 40 MG: 40 INJECTION, POWDER, FOR SOLUTION INTRAVENOUS at 08:11

## 2023-01-01 RX ADMIN — ACETAMINOPHEN 650 MG: 650 SUSPENSION ORAL at 05:19

## 2023-01-01 RX ADMIN — INSULIN LISPRO 4 UNITS: 100 INJECTION, SOLUTION INTRAVENOUS; SUBCUTANEOUS at 18:38

## 2023-01-01 RX ADMIN — PANTOPRAZOLE SODIUM 20 MG: 20 TABLET, DELAYED RELEASE ORAL at 06:18

## 2023-01-01 RX ADMIN — Medication 1000 UNITS: at 08:46

## 2023-01-01 RX ADMIN — LIDOCAINE 5% 1 PATCH: 700 PATCH TOPICAL at 08:21

## 2023-01-01 RX ADMIN — INSULIN LISPRO 4 UNITS: 100 INJECTION, SOLUTION INTRAVENOUS; SUBCUTANEOUS at 12:22

## 2023-01-01 RX ADMIN — CEFTRIAXONE SODIUM 1000 MG: 10 INJECTION, POWDER, FOR SOLUTION INTRAVENOUS at 11:52

## 2023-01-01 RX ADMIN — CHLORHEXIDINE GLUCONATE 15 ML: 1.2 SOLUTION ORAL at 10:08

## 2023-01-01 RX ADMIN — ACETAMINOPHEN 650 MG: 650 SUSPENSION ORAL at 00:21

## 2023-01-01 RX ADMIN — HYDROMORPHONE HYDROCHLORIDE 0.5 MG: 1 INJECTION, SOLUTION INTRAMUSCULAR; INTRAVENOUS; SUBCUTANEOUS at 15:50

## 2023-01-01 RX ADMIN — Medication 20000 ML: at 21:35

## 2023-01-01 RX ADMIN — METHYLPREDNISOLONE SODIUM SUCCINATE 20 MG: 40 INJECTION, POWDER, FOR SOLUTION INTRAMUSCULAR; INTRAVENOUS at 23:04

## 2023-01-01 RX ADMIN — CALCIUM GLUCONATE 1 G: 20 INJECTION, SOLUTION INTRAVENOUS at 07:26

## 2023-01-01 RX ADMIN — HYDROMORPHONE HYDROCHLORIDE 0.5 MG: 1 INJECTION, SOLUTION INTRAMUSCULAR; INTRAVENOUS; SUBCUTANEOUS at 08:29

## 2023-01-01 RX ADMIN — PANTOPRAZOLE SODIUM 40 MG: 40 INJECTION, POWDER, FOR SOLUTION INTRAVENOUS at 10:08

## 2023-01-01 RX ADMIN — HYDROMORPHONE HYDROCHLORIDE 0.2 MG: 0.2 INJECTION, SOLUTION INTRAMUSCULAR; INTRAVENOUS; SUBCUTANEOUS at 21:47

## 2023-01-01 RX ADMIN — CALCIUM GLUCONATE 1 G: 20 INJECTION, SOLUTION INTRAVENOUS at 08:59

## 2023-01-01 RX ADMIN — CALCIUM GLUCONATE 2 G: 20 INJECTION, SOLUTION INTRAVENOUS at 20:35

## 2023-01-01 RX ADMIN — PANTOPRAZOLE SODIUM 40 MG: 40 INJECTION, POWDER, FOR SOLUTION INTRAVENOUS at 08:16

## 2023-01-01 RX ADMIN — ACETAMINOPHEN 650 MG: 650 SUSPENSION ORAL at 13:47

## 2023-01-01 RX ADMIN — PANTOPRAZOLE SODIUM 40 MG: 40 INJECTION, POWDER, FOR SOLUTION INTRAVENOUS at 08:18

## 2023-01-01 RX ADMIN — VASOPRESSIN 0.04 UNITS/MIN: 20 INJECTION, SOLUTION INTRAMUSCULAR; SUBCUTANEOUS at 09:09

## 2023-01-01 RX ADMIN — GLYCOPYRROLATE 0.1 MG: 0.2 INJECTION, SOLUTION INTRAMUSCULAR; INTRAVENOUS at 20:37

## 2023-01-01 RX ADMIN — CHLORHEXIDINE GLUCONATE 15 ML: 1.2 SOLUTION ORAL at 09:06

## 2023-01-01 RX ADMIN — INSULIN LISPRO 2 UNITS: 100 INJECTION, SOLUTION INTRAVENOUS; SUBCUTANEOUS at 05:06

## 2023-01-01 RX ADMIN — PANTOPRAZOLE SODIUM 40 MG: 40 INJECTION, POWDER, FOR SOLUTION INTRAVENOUS at 22:00

## 2023-01-01 RX ADMIN — SODIUM PHOSPHATE, MONOBASIC, MONOHYDRATE AND SODIUM PHOSPHATE, DIBASIC, ANHYDROUS 21 MMOL: 142; 276 INJECTION, SOLUTION INTRAVENOUS at 09:05

## 2023-01-01 RX ADMIN — FENTANYL CITRATE 25 MCG: 50 INJECTION, SOLUTION INTRAMUSCULAR; INTRAVENOUS at 18:53

## 2023-01-01 RX ADMIN — HYDROMORPHONE HYDROCHLORIDE 0.5 MG: 1 INJECTION, SOLUTION INTRAMUSCULAR; INTRAVENOUS; SUBCUTANEOUS at 21:02

## 2023-01-01 RX ADMIN — HYDROMORPHONE HYDROCHLORIDE 0.2 MG: 0.2 INJECTION, SOLUTION INTRAMUSCULAR; INTRAVENOUS; SUBCUTANEOUS at 21:58

## 2023-01-01 RX ADMIN — SODIUM BICARBONATE 150 ML/HR: 84 INJECTION, SOLUTION INTRAVENOUS at 05:26

## 2023-01-01 RX ADMIN — INSULIN LISPRO 2 UNITS: 100 INJECTION, SOLUTION INTRAVENOUS; SUBCUTANEOUS at 18:08

## 2023-01-01 RX ADMIN — LIDOCAINE 5% 1 PATCH: 700 PATCH TOPICAL at 09:22

## 2023-01-01 RX ADMIN — HYDROMORPHONE HYDROCHLORIDE 0.2 MG: 0.2 INJECTION, SOLUTION INTRAMUSCULAR; INTRAVENOUS; SUBCUTANEOUS at 05:05

## 2023-01-01 RX ADMIN — Medication 20000 ML: at 17:44

## 2023-01-01 RX ADMIN — HYDROMORPHONE HYDROCHLORIDE 0.5 MG: 1 INJECTION, SOLUTION INTRAMUSCULAR; INTRAVENOUS; SUBCUTANEOUS at 03:29

## 2023-01-01 RX ADMIN — Medication 20000 ML: at 09:52

## 2023-01-01 RX ADMIN — HYDROMORPHONE HYDROCHLORIDE 1 MG: 1 INJECTION, SOLUTION INTRAMUSCULAR; INTRAVENOUS; SUBCUTANEOUS at 17:42

## 2023-01-01 RX ADMIN — Medication 1 MG/HR: at 17:54

## 2023-01-01 RX ADMIN — CHLORHEXIDINE GLUCONATE 15 ML: 1.2 SOLUTION ORAL at 22:06

## 2023-01-01 RX ADMIN — INSULIN LISPRO 2 UNITS: 100 INJECTION, SOLUTION INTRAVENOUS; SUBCUTANEOUS at 17:56

## 2023-01-01 RX ADMIN — CALCIUM GLUCONATE 1 G: 20 INJECTION, SOLUTION INTRAVENOUS at 03:02

## 2023-01-01 RX ADMIN — NYSTATIN: 100000 POWDER TOPICAL at 08:17

## 2023-01-01 RX ADMIN — Medication 20000 ML: at 20:48

## 2023-01-01 RX ADMIN — SODIUM BICARBONATE 50 MEQ: 84 INJECTION INTRAVENOUS at 22:30

## 2023-01-01 RX ADMIN — MELATONIN 6 MG: at 21:01

## 2023-01-01 RX ADMIN — POTASSIUM CHLORIDE 20 MEQ: 14.9 INJECTION, SOLUTION INTRAVENOUS at 19:34

## 2023-01-01 RX ADMIN — HEPARIN SODIUM 5000 UNITS: 5000 INJECTION INTRAVENOUS; SUBCUTANEOUS at 21:05

## 2023-01-01 RX ADMIN — TRIMETHOBENZAMIDE HYDROCHLORIDE 200 MG: 100 INJECTION INTRAMUSCULAR at 13:25

## 2023-01-01 RX ADMIN — CALCIUM GLUCONATE 1 G: 20 INJECTION, SOLUTION INTRAVENOUS at 07:36

## 2023-01-01 RX ADMIN — HYDROMORPHONE HYDROCHLORIDE 0.2 MG: 0.2 INJECTION, SOLUTION INTRAMUSCULAR; INTRAVENOUS; SUBCUTANEOUS at 08:21

## 2023-01-01 RX ADMIN — OXYCODONE HYDROCHLORIDE 5 MG: 5 SOLUTION ORAL at 15:32

## 2023-01-01 RX ADMIN — HYDROMORPHONE HYDROCHLORIDE 0.5 MG: 1 INJECTION, SOLUTION INTRAMUSCULAR; INTRAVENOUS; SUBCUTANEOUS at 18:54

## 2023-01-01 RX ADMIN — MELATONIN 6 MG: at 21:56

## 2023-01-01 RX ADMIN — LIDOCAINE 5% 1 PATCH: 700 PATCH TOPICAL at 08:43

## 2023-01-01 RX ADMIN — DEXTROSE MONOHYDRATE 50 ML: 25 INJECTION, SOLUTION INTRAVENOUS at 17:16

## 2023-01-01 RX ADMIN — SODIUM CHLORIDE, SODIUM GLUCONATE, SODIUM ACETATE, POTASSIUM CHLORIDE, MAGNESIUM CHLORIDE, SODIUM PHOSPHATE, DIBASIC, AND POTASSIUM PHOSPHATE 500 ML: .53; .5; .37; .037; .03; .012; .00082 INJECTION, SOLUTION INTRAVENOUS at 11:15

## 2023-01-01 RX ADMIN — IPRATROPIUM BROMIDE 0.5 MG: 0.5 SOLUTION RESPIRATORY (INHALATION) at 14:28

## 2023-01-01 RX ADMIN — MELATONIN 6 MG: at 21:11

## 2023-01-01 RX ADMIN — LIDOCAINE 5% 1 PATCH: 700 PATCH TOPICAL at 08:15

## 2023-01-01 RX ADMIN — HYDROMORPHONE HYDROCHLORIDE 0.5 MG/HR: 10 INJECTION, SOLUTION INTRAMUSCULAR; INTRAVENOUS; SUBCUTANEOUS at 17:26

## 2023-01-01 RX ADMIN — MAGNESIUM SULFATE HEPTAHYDRATE 1 G: 1 INJECTION, SOLUTION INTRAVENOUS at 00:47

## 2023-01-01 RX ADMIN — LIDOCAINE 5% 1 PATCH: 700 PATCH TOPICAL at 08:18

## 2023-01-01 RX ADMIN — MIDAZOLAM 0.5 MG: 1 INJECTION INTRAMUSCULAR; INTRAVENOUS at 18:53

## 2023-01-01 RX ADMIN — HYDROMORPHONE HYDROCHLORIDE 0.5 MG: 1 INJECTION, SOLUTION INTRAMUSCULAR; INTRAVENOUS; SUBCUTANEOUS at 20:54

## 2023-01-01 RX ADMIN — METHYLPREDNISOLONE SODIUM SUCCINATE 20 MG: 40 INJECTION, POWDER, FOR SOLUTION INTRAMUSCULAR; INTRAVENOUS at 10:08

## 2023-01-01 RX ADMIN — CALCIUM GLUCONATE 1 G: 20 INJECTION, SOLUTION INTRAVENOUS at 01:04

## 2023-01-01 RX ADMIN — HYDROMORPHONE HYDROCHLORIDE 0.2 MG: 0.2 INJECTION, SOLUTION INTRAMUSCULAR; INTRAVENOUS; SUBCUTANEOUS at 00:24

## 2023-01-01 RX ADMIN — CALCIUM GLUCONATE 1 G: 20 INJECTION, SOLUTION INTRAVENOUS at 16:12

## 2023-01-01 RX ADMIN — METHYLPREDNISOLONE SODIUM SUCCINATE 20 MG: 40 INJECTION, POWDER, FOR SOLUTION INTRAMUSCULAR; INTRAVENOUS at 20:55

## 2023-01-01 RX ADMIN — PHYTONADIONE 10 MG: 10 INJECTION, EMULSION INTRAMUSCULAR; INTRAVENOUS; SUBCUTANEOUS at 22:06

## 2023-01-01 RX ADMIN — CEFEPIME 1000 MG: 1 INJECTION, POWDER, FOR SOLUTION INTRAMUSCULAR; INTRAVENOUS at 18:39

## 2023-01-01 RX ADMIN — MIDAZOLAM 0.5 MG: 1 INJECTION INTRAMUSCULAR; INTRAVENOUS at 19:30

## 2023-01-01 RX ADMIN — INSULIN HUMAN 5 UNITS: 100 INJECTION, SOLUTION PARENTERAL at 20:24

## 2023-01-01 RX ADMIN — HYDROMORPHONE HYDROCHLORIDE 0.5 MG: 1 INJECTION, SOLUTION INTRAMUSCULAR; INTRAVENOUS; SUBCUTANEOUS at 01:28

## 2023-01-01 RX ADMIN — FENTANYL CITRATE 25 MCG: 50 INJECTION, SOLUTION INTRAMUSCULAR; INTRAVENOUS at 22:44

## 2023-01-01 RX ADMIN — SODIUM BICARBONATE 50 MEQ: 84 INJECTION INTRAVENOUS at 21:58

## 2023-01-01 RX ADMIN — Medication 1000 UNITS: at 09:04

## 2023-01-01 RX ADMIN — HYDROMORPHONE HYDROCHLORIDE 0.5 MG: 1 INJECTION, SOLUTION INTRAMUSCULAR; INTRAVENOUS; SUBCUTANEOUS at 02:06

## 2023-01-01 RX ADMIN — Medication 10 MG: at 09:00

## 2023-01-01 RX ADMIN — PANTOPRAZOLE SODIUM 40 MG: 40 INJECTION, POWDER, FOR SOLUTION INTRAVENOUS at 10:06

## 2023-01-01 RX ADMIN — ACETAMINOPHEN 650 MG: 650 SUSPENSION ORAL at 18:05

## 2023-01-01 RX ADMIN — LIDOCAINE 5% 1 PATCH: 700 PATCH TOPICAL at 08:16

## 2023-01-01 RX ADMIN — METHYLPREDNISOLONE SODIUM SUCCINATE 20 MG: 40 INJECTION, POWDER, FOR SOLUTION INTRAMUSCULAR; INTRAVENOUS at 09:05

## 2023-01-01 RX ADMIN — HYDROMORPHONE HYDROCHLORIDE 0.5 MG: 1 INJECTION, SOLUTION INTRAMUSCULAR; INTRAVENOUS; SUBCUTANEOUS at 10:08

## 2023-01-01 RX ADMIN — HEPARIN SODIUM 5000 UNITS: 5000 INJECTION INTRAVENOUS; SUBCUTANEOUS at 12:12

## 2023-01-01 RX ADMIN — NOREPINEPHRINE BITARTRATE 15 MCG/MIN: 1 INJECTION, SOLUTION, CONCENTRATE INTRAVENOUS at 08:56

## 2023-01-01 RX ADMIN — POTASSIUM CHLORIDE 20 MEQ: 14.9 INJECTION, SOLUTION INTRAVENOUS at 20:33

## 2023-01-01 RX ADMIN — HYDROMORPHONE HYDROCHLORIDE 0.2 MG: 0.2 INJECTION, SOLUTION INTRAMUSCULAR; INTRAVENOUS; SUBCUTANEOUS at 10:13

## 2023-01-01 RX ADMIN — ACETAMINOPHEN 650 MG: 650 SUSPENSION ORAL at 12:15

## 2023-01-01 RX ADMIN — MELATONIN 6 MG: at 20:47

## 2023-01-01 RX ADMIN — NOREPINEPHRINE BITARTRATE 4 MG: 1 INJECTION, SOLUTION, CONCENTRATE INTRAVENOUS at 01:55

## 2023-01-01 RX ADMIN — SOTALOL HYDROCHLORIDE 80 MG: 80 TABLET ORAL at 18:08

## 2023-01-01 RX ADMIN — PANTOPRAZOLE SODIUM 40 MG: 40 INJECTION, POWDER, FOR SOLUTION INTRAVENOUS at 20:06

## 2023-01-01 RX ADMIN — Medication 1000 UNITS: at 11:52

## 2023-01-01 RX ADMIN — INSULIN LISPRO 2 UNITS: 100 INJECTION, SOLUTION INTRAVENOUS; SUBCUTANEOUS at 06:20

## 2023-01-01 RX ADMIN — MAGNESIUM SULFATE HEPTAHYDRATE 1 G: 1 INJECTION, SOLUTION INTRAVENOUS at 16:25

## 2023-01-01 RX ADMIN — CHLORHEXIDINE GLUCONATE 15 ML: 1.2 SOLUTION ORAL at 20:06

## 2023-01-01 RX ADMIN — DEXTROSE MONOHYDRATE 25 ML: 25 INJECTION, SOLUTION INTRAVENOUS at 20:23

## 2023-01-01 RX ADMIN — SODIUM BICARBONATE 150 ML/HR: 84 INJECTION, SOLUTION INTRAVENOUS at 21:57

## 2023-01-01 RX ADMIN — LEVALBUTEROL HYDROCHLORIDE 1.25 MG: 1.25 SOLUTION RESPIRATORY (INHALATION) at 21:35

## 2023-01-01 RX ADMIN — Medication 20000 ML: at 08:00

## 2023-01-01 RX ADMIN — INSULIN LISPRO 2 UNITS: 100 INJECTION, SOLUTION INTRAVENOUS; SUBCUTANEOUS at 18:06

## 2023-01-01 RX ADMIN — LIDOCAINE 5% 1 PATCH: 700 PATCH TOPICAL at 10:06

## 2023-01-01 RX ADMIN — PANTOPRAZOLE SODIUM 40 MG: 40 INJECTION, POWDER, FOR SOLUTION INTRAVENOUS at 08:21

## 2023-01-01 RX ADMIN — VASOPRESSIN 0.04 UNITS/MIN: 20 INJECTION, SOLUTION INTRAMUSCULAR; SUBCUTANEOUS at 08:00

## 2023-01-01 RX ADMIN — HYDROMORPHONE HYDROCHLORIDE 0.2 MG: 0.2 INJECTION, SOLUTION INTRAMUSCULAR; INTRAVENOUS; SUBCUTANEOUS at 21:05

## 2023-01-01 RX ADMIN — NYSTATIN: 100000 POWDER TOPICAL at 09:35

## 2023-01-01 RX ADMIN — HEPARIN SODIUM 5000 UNITS: 5000 INJECTION INTRAVENOUS; SUBCUTANEOUS at 06:13

## 2023-01-01 RX ADMIN — HEPARIN SODIUM 5000 UNITS: 5000 INJECTION INTRAVENOUS; SUBCUTANEOUS at 21:36

## 2023-01-01 RX ADMIN — NYSTATIN: 100000 POWDER TOPICAL at 18:15

## 2023-01-01 RX ADMIN — GABAPENTIN 300 MG: 300 CAPSULE ORAL at 21:01

## 2023-01-01 RX ADMIN — GLYCOPYRROLATE 0.1 MG: 0.2 INJECTION, SOLUTION INTRAMUSCULAR; INTRAVENOUS at 02:19

## 2023-01-01 RX ADMIN — Medication 10 MG: at 16:16

## 2023-01-01 RX ADMIN — HYDROMORPHONE HYDROCHLORIDE 0.5 MG: 1 INJECTION, SOLUTION INTRAMUSCULAR; INTRAVENOUS; SUBCUTANEOUS at 13:32

## 2023-01-01 RX ADMIN — SODIUM PHOSPHATE, MONOBASIC, MONOHYDRATE AND SODIUM PHOSPHATE, DIBASIC, ANHYDROUS 6 MMOL: 142; 276 INJECTION, SOLUTION INTRAVENOUS at 08:24

## 2023-01-01 RX ADMIN — LIDOCAINE 5% 1 PATCH: 700 PATCH TOPICAL at 12:12

## 2023-01-01 RX ADMIN — CALCIUM GLUCONATE 2 G: 20 INJECTION, SOLUTION INTRAVENOUS at 14:00

## 2023-01-01 RX ADMIN — OXYCODONE HYDROCHLORIDE 7.5 MG: 5 SOLUTION ORAL at 12:15

## 2023-01-01 RX ADMIN — HYDROMORPHONE HYDROCHLORIDE 0.5 MG/HR: 10 INJECTION, SOLUTION INTRAMUSCULAR; INTRAVENOUS; SUBCUTANEOUS at 09:22

## 2023-01-01 RX ADMIN — ALBUTEROL SULFATE: 2.5 SOLUTION RESPIRATORY (INHALATION) at 17:47

## 2023-01-01 RX ADMIN — LIDOCAINE HYDROCHLORIDE,EPINEPHRINE BITARTRATE 10 ML: 10; .01 INJECTION, SOLUTION INFILTRATION; PERINEURAL at 18:58

## 2023-01-01 RX ADMIN — FUROSEMIDE 40 MG: 10 INJECTION, SOLUTION INTRAMUSCULAR; INTRAVENOUS at 08:18

## 2023-01-01 RX ADMIN — CALCIUM CHLORIDE 1 G: 100 INJECTION INTRAVENOUS; INTRAVENTRICULAR at 22:30

## 2023-01-01 RX ADMIN — NYSTATIN: 100000 POWDER TOPICAL at 08:44

## 2023-01-01 RX ADMIN — HEPARIN SODIUM 7500 UNITS: 5000 INJECTION INTRAVENOUS; SUBCUTANEOUS at 21:55

## 2023-01-01 RX ADMIN — MAGNESIUM SULFATE HEPTAHYDRATE 2 G: 40 INJECTION, SOLUTION INTRAVENOUS at 20:35

## 2023-01-01 RX ADMIN — PANTOPRAZOLE SODIUM 40 MG: 40 INJECTION, POWDER, FOR SOLUTION INTRAVENOUS at 09:06

## 2023-01-01 RX ADMIN — INSULIN LISPRO 2 UNITS: 100 INJECTION, SOLUTION INTRAVENOUS; SUBCUTANEOUS at 17:41

## 2023-01-01 RX ADMIN — FLUDROCORTISONE ACETATE 0.05 MG: 0.1 TABLET ORAL at 14:17

## 2023-01-01 RX ADMIN — Medication 0.5 MG/HR: at 12:26

## 2023-01-01 RX ADMIN — HEPARIN SODIUM 5000 UNITS: 5000 INJECTION INTRAVENOUS; SUBCUTANEOUS at 06:08

## 2023-01-01 RX ADMIN — INSULIN GLARGINE 10 UNITS: 100 INJECTION, SOLUTION SUBCUTANEOUS at 21:30

## 2023-01-01 RX ADMIN — HYDROMORPHONE HYDROCHLORIDE 0.5 MG: 1 INJECTION, SOLUTION INTRAMUSCULAR; INTRAVENOUS; SUBCUTANEOUS at 10:55

## 2023-01-01 RX ADMIN — ALTEPLASE 2 MG: 2.2 INJECTION, POWDER, LYOPHILIZED, FOR SOLUTION INTRAVENOUS at 20:44

## 2023-01-01 RX ADMIN — Medication 2000 UNITS: at 22:14

## 2023-01-01 RX ADMIN — TRIMETHOBENZAMIDE HYDROCHLORIDE 200 MG: 100 INJECTION INTRAMUSCULAR at 16:40

## 2023-01-01 RX ADMIN — HYDROMORPHONE HYDROCHLORIDE 0.5 MG: 1 INJECTION, SOLUTION INTRAMUSCULAR; INTRAVENOUS; SUBCUTANEOUS at 15:00

## 2023-01-01 RX ADMIN — HYDROMORPHONE HYDROCHLORIDE 0.2 MG: 0.2 INJECTION, SOLUTION INTRAMUSCULAR; INTRAVENOUS; SUBCUTANEOUS at 00:01

## 2023-01-01 RX ADMIN — MELATONIN 6 MG: at 21:00

## 2023-01-01 RX ADMIN — INSULIN GLARGINE 10 UNITS: 100 INJECTION, SOLUTION SUBCUTANEOUS at 21:01

## 2023-01-01 RX ADMIN — LEVALBUTEROL HYDROCHLORIDE 1.25 MG: 1.25 SOLUTION RESPIRATORY (INHALATION) at 13:51

## 2023-01-01 RX ADMIN — CALCIUM GLUCONATE 1 G: 20 INJECTION, SOLUTION INTRAVENOUS at 14:12

## 2023-01-01 RX ADMIN — INSULIN LISPRO 2 UNITS: 100 INJECTION, SOLUTION INTRAVENOUS; SUBCUTANEOUS at 21:55

## 2023-01-01 RX ADMIN — CEFEPIME 1000 MG: 1 INJECTION, POWDER, FOR SOLUTION INTRAMUSCULAR; INTRAVENOUS at 06:14

## 2023-01-01 RX ADMIN — INSULIN LISPRO 2 UNITS: 100 INJECTION, SOLUTION INTRAVENOUS; SUBCUTANEOUS at 11:39

## 2023-01-01 RX ADMIN — MAGNESIUM SULFATE HEPTAHYDRATE 1 G: 1 INJECTION, SOLUTION INTRAVENOUS at 20:00

## 2023-01-01 RX ADMIN — HYDROMORPHONE HYDROCHLORIDE 0.5 MG: 1 INJECTION, SOLUTION INTRAMUSCULAR; INTRAVENOUS; SUBCUTANEOUS at 20:56

## 2023-01-01 RX ADMIN — CALCIUM GLUCONATE 1 G: 20 INJECTION, SOLUTION INTRAVENOUS at 21:05

## 2023-01-01 RX ADMIN — HYDROMORPHONE HYDROCHLORIDE 0.2 MG: 0.2 INJECTION, SOLUTION INTRAMUSCULAR; INTRAVENOUS; SUBCUTANEOUS at 01:50

## 2023-01-01 RX ADMIN — ACETAMINOPHEN 650 MG: 650 SUSPENSION ORAL at 11:50

## 2023-01-01 RX ADMIN — HYDROMORPHONE HYDROCHLORIDE 0.5 MG: 1 INJECTION, SOLUTION INTRAMUSCULAR; INTRAVENOUS; SUBCUTANEOUS at 21:57

## 2023-01-01 RX ADMIN — Medication 20000 ML: at 07:45

## 2023-01-01 RX ADMIN — METHYLPREDNISOLONE SODIUM SUCCINATE 20 MG: 40 INJECTION, POWDER, FOR SOLUTION INTRAMUSCULAR; INTRAVENOUS at 14:17

## 2023-01-01 RX ADMIN — CALCIUM CHLORIDE 1 G: 100 INJECTION INTRAVENOUS; INTRAVENTRICULAR at 22:00

## 2023-01-01 RX ADMIN — INSULIN LISPRO 2 UNITS: 100 INJECTION, SOLUTION INTRAVENOUS; SUBCUTANEOUS at 12:11

## 2023-01-01 RX ADMIN — HEPARIN SODIUM 7500 UNITS: 5000 INJECTION INTRAVENOUS; SUBCUTANEOUS at 17:56

## 2023-01-01 RX ADMIN — Medication 20000 ML: at 03:03

## 2023-01-01 RX ADMIN — LIDOCAINE 4%: 4 CREAM TOPICAL at 16:40

## 2023-01-01 RX ADMIN — Medication 20000 ML: at 15:24

## 2023-01-01 RX ADMIN — SODIUM PHOSPHATE, MONOBASIC, MONOHYDRATE AND SODIUM PHOSPHATE, DIBASIC, ANHYDROUS 6 MMOL: 142; 276 INJECTION, SOLUTION INTRAVENOUS at 20:06

## 2023-01-01 RX ADMIN — Medication 25 MEQ: at 20:28

## 2023-01-01 RX ADMIN — INSULIN HUMAN 10 UNITS: 100 INJECTION, SOLUTION PARENTERAL at 18:05

## 2023-01-01 RX ADMIN — HEPARIN SODIUM 5000 UNITS: 5000 INJECTION INTRAVENOUS; SUBCUTANEOUS at 13:18

## 2023-01-01 RX ADMIN — Medication 50 MEQ: at 21:58

## 2023-01-01 RX ADMIN — HEPARIN SODIUM 5000 UNITS: 5000 INJECTION INTRAVENOUS; SUBCUTANEOUS at 06:04

## 2023-01-01 RX ADMIN — CHLORHEXIDINE GLUCONATE 15 ML: 1.2 SOLUTION ORAL at 20:55

## 2023-01-01 RX ADMIN — SODIUM PHOSPHATE, MONOBASIC, MONOHYDRATE AND SODIUM PHOSPHATE, DIBASIC, ANHYDROUS 9 MMOL: 142; 276 INJECTION, SOLUTION INTRAVENOUS at 01:55

## 2023-01-01 RX ADMIN — FUROSEMIDE 40 MG: 10 INJECTION, SOLUTION INTRAMUSCULAR; INTRAVENOUS at 08:43

## 2023-01-01 RX ADMIN — CALCIUM GLUCONATE 1 G: 20 INJECTION, SOLUTION INTRAVENOUS at 20:06

## 2023-01-01 RX ADMIN — NYSTATIN: 100000 POWDER TOPICAL at 08:19

## 2023-01-01 RX ADMIN — HEPARIN SODIUM 5000 UNITS: 5000 INJECTION INTRAVENOUS; SUBCUTANEOUS at 05:19

## 2023-01-01 RX ADMIN — PANTOPRAZOLE SODIUM 40 MG: 40 INJECTION, POWDER, FOR SOLUTION INTRAVENOUS at 20:55

## 2023-01-01 RX ADMIN — CALCIUM CHLORIDE 1 G: 100 INJECTION INTRAVENOUS; INTRAVENTRICULAR at 23:00

## 2023-01-01 RX ADMIN — SODIUM PHOSPHATE, MONOBASIC, MONOHYDRATE AND SODIUM PHOSPHATE, DIBASIC, ANHYDROUS 6 MMOL: 142; 276 INJECTION, SOLUTION INTRAVENOUS at 21:59

## 2023-01-01 RX ADMIN — SODIUM BICARBONATE 50 MEQ: 84 INJECTION INTRAVENOUS at 23:30

## 2023-01-01 RX ADMIN — INSULIN LISPRO 2 UNITS: 100 INJECTION, SOLUTION INTRAVENOUS; SUBCUTANEOUS at 11:52

## 2023-01-01 RX ADMIN — VASOPRESSIN 0.04 UNITS/MIN: 20 INJECTION, SOLUTION INTRAMUSCULAR; SUBCUTANEOUS at 07:46

## 2023-01-01 RX ADMIN — GLYCOPYRROLATE 0.1 MG: 0.2 INJECTION, SOLUTION INTRAMUSCULAR; INTRAVENOUS at 15:50

## 2023-01-01 RX ADMIN — HYDROMORPHONE HYDROCHLORIDE 0.2 MG: 0.2 INJECTION, SOLUTION INTRAMUSCULAR; INTRAVENOUS; SUBCUTANEOUS at 11:34

## 2023-01-01 RX ADMIN — HEPARIN SODIUM 5000 UNITS: 5000 INJECTION INTRAVENOUS; SUBCUTANEOUS at 18:03

## 2023-01-01 RX ADMIN — HEPARIN SODIUM 5000 UNITS: 5000 INJECTION INTRAVENOUS; SUBCUTANEOUS at 05:06

## 2023-01-01 RX ADMIN — CALCIUM GLUCONATE 1 G: 20 INJECTION, SOLUTION INTRAVENOUS at 17:08

## 2023-01-01 RX ADMIN — INSULIN LISPRO 2 UNITS: 100 INJECTION, SOLUTION INTRAVENOUS; SUBCUTANEOUS at 06:27

## 2023-01-01 RX ADMIN — LORAZEPAM 1 MG: 2 INJECTION INTRAMUSCULAR; INTRAVENOUS at 15:52

## 2023-01-01 RX ADMIN — HYDROMORPHONE HYDROCHLORIDE 0.5 MG: 1 INJECTION, SOLUTION INTRAMUSCULAR; INTRAVENOUS; SUBCUTANEOUS at 06:27

## 2023-01-01 RX ADMIN — CALCIUM CHLORIDE 1 G: 100 INJECTION INTRAVENOUS; INTRAVENTRICULAR at 22:01

## 2023-01-01 RX ADMIN — ACETAMINOPHEN 650 MG: 325 TABLET ORAL at 13:30

## 2023-01-01 RX ADMIN — HYDROMORPHONE HYDROCHLORIDE 0.2 MG: 0.2 INJECTION, SOLUTION INTRAMUSCULAR; INTRAVENOUS; SUBCUTANEOUS at 18:24

## 2023-01-01 RX ADMIN — PANTOPRAZOLE SODIUM 40 MG: 40 INJECTION, POWDER, FOR SOLUTION INTRAVENOUS at 10:35

## 2023-01-01 RX ADMIN — LIDOCAINE 5% 1 PATCH: 700 PATCH TOPICAL at 09:01

## 2023-01-01 RX ADMIN — SODIUM CHLORIDE SOLN NEBU 3% 4 ML: 3 NEBU SOLN at 14:28

## 2023-01-01 RX ADMIN — LEVALBUTEROL HYDROCHLORIDE 1.25 MG: 1.25 SOLUTION RESPIRATORY (INHALATION) at 14:40

## 2023-01-01 RX ADMIN — SODIUM PHOSPHATE, MONOBASIC, MONOHYDRATE AND SODIUM PHOSPHATE, DIBASIC, ANHYDROUS 6 MMOL: 142; 276 INJECTION, SOLUTION INTRAVENOUS at 04:06

## 2023-01-01 RX ADMIN — HYDROMORPHONE HYDROCHLORIDE 0.5 MG: 1 INJECTION, SOLUTION INTRAMUSCULAR; INTRAVENOUS; SUBCUTANEOUS at 02:57

## 2023-01-01 RX ADMIN — SENNOSIDES 17.2 MG: 8.6 TABLET, FILM COATED ORAL at 22:06

## 2023-01-01 RX ADMIN — Medication 20000 ML: at 02:15

## 2023-01-01 RX ADMIN — VASOPRESSIN 0.04 UNITS/MIN: 20 INJECTION, SOLUTION INTRAMUSCULAR; SUBCUTANEOUS at 14:00

## 2023-01-01 RX ADMIN — FUROSEMIDE 40 MG: 10 INJECTION, SOLUTION INTRAMUSCULAR; INTRAVENOUS at 08:11

## 2023-01-01 RX ADMIN — CALCIUM GLUCONATE 1 G: 20 INJECTION, SOLUTION INTRAVENOUS at 03:15

## 2023-01-01 RX ADMIN — ACETAMINOPHEN 650 MG: 650 SUSPENSION ORAL at 11:22

## 2023-01-01 RX ADMIN — HEPARIN SODIUM 5000 UNITS: 5000 INJECTION INTRAVENOUS; SUBCUTANEOUS at 14:14

## 2023-01-01 RX ADMIN — ACETAMINOPHEN 650 MG: 325 TABLET ORAL at 08:46

## 2023-01-01 RX ADMIN — HYDROMORPHONE HYDROCHLORIDE 0.2 MG: 0.2 INJECTION, SOLUTION INTRAMUSCULAR; INTRAVENOUS; SUBCUTANEOUS at 05:59

## 2023-01-01 RX ADMIN — ACETAMINOPHEN 650 MG: 650 SUSPENSION ORAL at 17:38

## 2023-01-01 RX ADMIN — PANTOPRAZOLE SODIUM 40 MG: 40 INJECTION, POWDER, FOR SOLUTION INTRAVENOUS at 08:43

## 2023-01-01 RX ADMIN — Medication 10 MG: at 12:12

## 2023-01-01 RX ADMIN — SODIUM PHOSPHATE, MONOBASIC, MONOHYDRATE AND SODIUM PHOSPHATE, DIBASIC, ANHYDROUS 6 MMOL: 142; 276 INJECTION, SOLUTION INTRAVENOUS at 01:58

## 2023-01-01 RX ADMIN — GLYCOPYRROLATE 0.1 MG: 0.2 INJECTION, SOLUTION INTRAMUSCULAR; INTRAVENOUS at 08:09

## 2023-01-01 RX ADMIN — HEPARIN SODIUM 7500 UNITS: 5000 INJECTION INTRAVENOUS; SUBCUTANEOUS at 05:53

## 2023-01-01 RX ADMIN — HYDROMORPHONE HYDROCHLORIDE 0.5 MG: 1 INJECTION, SOLUTION INTRAMUSCULAR; INTRAVENOUS; SUBCUTANEOUS at 16:08

## 2023-01-01 RX ADMIN — PANTOPRAZOLE SODIUM 40 MG: 40 INJECTION, POWDER, FOR SOLUTION INTRAVENOUS at 09:00

## 2023-01-01 RX ADMIN — FLUDROCORTISONE ACETATE 0.05 MG: 0.1 TABLET ORAL at 08:58

## 2023-01-01 RX ADMIN — HYDROMORPHONE HYDROCHLORIDE 0.5 MG: 1 INJECTION, SOLUTION INTRAMUSCULAR; INTRAVENOUS; SUBCUTANEOUS at 17:38

## 2023-01-01 RX ADMIN — PANTOPRAZOLE SODIUM 40 MG: 40 INJECTION, POWDER, FOR SOLUTION INTRAVENOUS at 08:19

## 2023-01-01 RX ADMIN — MELATONIN 6 MG: at 21:42

## 2023-01-01 RX ADMIN — SODIUM PHOSPHATE, MONOBASIC, MONOHYDRATE AND SODIUM PHOSPHATE, DIBASIC, ANHYDROUS 9 MMOL: 142; 276 INJECTION, SOLUTION INTRAVENOUS at 20:00

## 2023-01-01 RX ADMIN — DEXTROSE MONOHYDRATE 25 ML: 25 INJECTION, SOLUTION INTRAVENOUS at 18:57

## 2023-01-01 RX ADMIN — SODIUM CHLORIDE, SODIUM GLUCONATE, SODIUM ACETATE, POTASSIUM CHLORIDE, MAGNESIUM CHLORIDE, SODIUM PHOSPHATE, DIBASIC, AND POTASSIUM PHOSPHATE 1000 ML: .53; .5; .37; .037; .03; .012; .00082 INJECTION, SOLUTION INTRAVENOUS at 14:30

## 2023-01-01 RX ADMIN — SOTALOL HYDROCHLORIDE 80 MG: 80 TABLET ORAL at 08:18

## 2023-01-01 RX ADMIN — SODIUM BICARBONATE 25 MEQ: 84 INJECTION INTRAVENOUS at 20:28

## 2023-01-01 RX ADMIN — HYDROMORPHONE HYDROCHLORIDE 0.5 MG: 1 INJECTION, SOLUTION INTRAMUSCULAR; INTRAVENOUS; SUBCUTANEOUS at 03:04

## 2023-01-01 RX ADMIN — HYDROMORPHONE HYDROCHLORIDE 0.5 MG: 1 INJECTION, SOLUTION INTRAMUSCULAR; INTRAVENOUS; SUBCUTANEOUS at 09:53

## 2023-01-01 RX ADMIN — INSULIN LISPRO 2 UNITS: 100 INJECTION, SOLUTION INTRAVENOUS; SUBCUTANEOUS at 05:40

## 2023-01-01 RX ADMIN — FUROSEMIDE 60 MG: 10 INJECTION, SOLUTION INTRAMUSCULAR; INTRAVENOUS at 15:49

## 2023-01-01 RX ADMIN — NYSTATIN: 100000 POWDER TOPICAL at 17:40

## 2023-01-01 RX ADMIN — INSULIN LISPRO 2 UNITS: 100 INJECTION, SOLUTION INTRAVENOUS; SUBCUTANEOUS at 23:47

## 2023-01-01 RX ADMIN — METOROPROLOL TARTRATE 2.5 MG: 5 INJECTION, SOLUTION INTRAVENOUS at 14:15

## 2023-01-01 RX ADMIN — MAGNESIUM SULFATE HEPTAHYDRATE 4 G: 40 INJECTION, SOLUTION INTRAVENOUS at 08:00

## 2023-01-01 RX ADMIN — PANTOPRAZOLE SODIUM 40 MG: 40 INJECTION, POWDER, FOR SOLUTION INTRAVENOUS at 21:48

## 2023-01-01 RX ADMIN — NOREPINEPHRINE BITARTRATE 26 MCG/MIN: 1 INJECTION, SOLUTION, CONCENTRATE INTRAVENOUS at 01:44

## 2023-01-01 RX ADMIN — HEPARIN SODIUM 7500 UNITS: 5000 INJECTION INTRAVENOUS; SUBCUTANEOUS at 10:06

## 2023-01-01 RX ADMIN — HYDROMORPHONE HYDROCHLORIDE 0.5 MG: 1 INJECTION, SOLUTION INTRAMUSCULAR; INTRAVENOUS; SUBCUTANEOUS at 16:45

## 2023-01-01 RX ADMIN — HEPARIN SODIUM 11.1 UNITS/KG/HR: 10000 INJECTION, SOLUTION INTRAVENOUS at 08:21

## 2023-01-01 RX ADMIN — Medication 20000 ML: at 00:12

## 2023-01-01 RX ADMIN — LIDOCAINE 5% 1 PATCH: 700 PATCH TOPICAL at 09:34

## 2023-01-01 RX ADMIN — INSULIN LISPRO 4 UNITS: 100 INJECTION, SOLUTION INTRAVENOUS; SUBCUTANEOUS at 00:20

## 2023-01-01 RX ADMIN — HYDROMORPHONE HYDROCHLORIDE 0.5 MG: 1 INJECTION, SOLUTION INTRAMUSCULAR; INTRAVENOUS; SUBCUTANEOUS at 03:53

## 2023-01-01 RX ADMIN — Medication 20000 ML: at 08:58

## 2023-01-01 RX ADMIN — Medication 20000 ML: at 04:15

## 2023-01-01 RX ADMIN — MAGNESIUM SULFATE HEPTAHYDRATE 1 G: 1 INJECTION, SOLUTION INTRAVENOUS at 04:48

## 2023-01-01 RX ADMIN — HEPARIN SODIUM 5000 UNITS: 5000 INJECTION INTRAVENOUS; SUBCUTANEOUS at 21:56

## 2023-01-01 RX ADMIN — CALCIUM GLUCONATE 1 G: 20 INJECTION, SOLUTION INTRAVENOUS at 19:34

## 2023-01-01 RX ADMIN — Medication 1000 UNITS: at 08:19

## 2023-01-01 RX ADMIN — SODIUM CHLORIDE SOLN NEBU 3% 4 ML: 3 NEBU SOLN at 07:51

## 2023-01-01 RX ADMIN — LEVALBUTEROL HYDROCHLORIDE 1.25 MG: 1.25 SOLUTION RESPIRATORY (INHALATION) at 07:51

## 2023-01-01 RX ADMIN — FUROSEMIDE 40 MG: 10 INJECTION, SOLUTION INTRAMUSCULAR; INTRAVENOUS at 09:54

## 2023-01-01 RX ADMIN — CEFEPIME 2000 MG: 2 INJECTION, POWDER, FOR SOLUTION INTRAVENOUS at 22:50

## 2023-01-01 RX ADMIN — ACETAMINOPHEN 650 MG: 650 SUSPENSION ORAL at 06:05

## 2023-01-01 RX ADMIN — SODIUM BICARBONATE 150 ML/HR: 84 INJECTION, SOLUTION INTRAVENOUS at 14:17

## 2023-01-01 RX ADMIN — CEFTRIAXONE SODIUM 2000 MG: 10 INJECTION, POWDER, FOR SOLUTION INTRAVENOUS at 10:48

## 2023-01-01 RX ADMIN — INSULIN LISPRO 4 UNITS: 100 INJECTION, SOLUTION INTRAVENOUS; SUBCUTANEOUS at 06:13

## 2023-01-01 RX ADMIN — HEPARIN SODIUM 5000 UNITS: 5000 INJECTION INTRAVENOUS; SUBCUTANEOUS at 21:11

## 2023-01-01 RX ADMIN — ACETAMINOPHEN 650 MG: 650 SUSPENSION ORAL at 00:34

## 2023-01-01 RX ADMIN — CALCIUM GLUCONATE 1 G: 20 INJECTION, SOLUTION INTRAVENOUS at 00:47

## 2023-01-01 RX ADMIN — INSULIN LISPRO 2 UNITS: 100 INJECTION, SOLUTION INTRAVENOUS; SUBCUTANEOUS at 00:45

## 2023-01-01 RX ADMIN — INSULIN LISPRO 2 UNITS: 100 INJECTION, SOLUTION INTRAVENOUS; SUBCUTANEOUS at 11:22

## 2023-01-01 RX ADMIN — ACETAMINOPHEN 650 MG: 650 SUSPENSION ORAL at 17:42

## 2023-01-01 RX ADMIN — NYSTATIN: 100000 POWDER TOPICAL at 17:41

## 2023-01-01 RX ADMIN — HYDROMORPHONE HYDROCHLORIDE 0.2 MG: 0.2 INJECTION, SOLUTION INTRAMUSCULAR; INTRAVENOUS; SUBCUTANEOUS at 22:04

## 2023-01-01 RX ADMIN — HYDROMORPHONE HYDROCHLORIDE 0.2 MG: 0.2 INJECTION, SOLUTION INTRAMUSCULAR; INTRAVENOUS; SUBCUTANEOUS at 14:58

## 2023-01-01 RX ADMIN — INSULIN LISPRO 2 UNITS: 100 INJECTION, SOLUTION INTRAVENOUS; SUBCUTANEOUS at 06:05

## 2023-01-01 RX ADMIN — SODIUM BICARBONATE 150 ML/HR: 84 INJECTION, SOLUTION INTRAVENOUS at 14:00

## 2023-01-01 RX ADMIN — Medication 1 MG/HR: at 23:35

## 2023-01-01 RX ADMIN — Medication 20000 ML: at 23:33

## 2023-01-01 RX ADMIN — CHLORHEXIDINE GLUCONATE 15 ML: 1.2 SOLUTION ORAL at 08:46

## 2023-01-01 RX ADMIN — LIDOCAINE 5% 1 PATCH: 700 PATCH TOPICAL at 08:19

## 2023-01-01 RX ADMIN — Medication 20000 ML: at 14:38

## 2023-01-01 RX ADMIN — ALTEPLASE 2 MG: 2.2 INJECTION, POWDER, LYOPHILIZED, FOR SOLUTION INTRAVENOUS at 02:14

## 2023-01-01 RX ADMIN — NOREPINEPHRINE BITARTRATE 4 MCG/MIN: 1 INJECTION, SOLUTION, CONCENTRATE INTRAVENOUS at 01:56

## 2023-01-01 RX ADMIN — CHLORHEXIDINE GLUCONATE 15 ML: 1.2 SOLUTION ORAL at 08:59

## 2023-01-01 RX ADMIN — SODIUM CHLORIDE, SODIUM GLUCONATE, SODIUM ACETATE, POTASSIUM CHLORIDE, MAGNESIUM CHLORIDE, SODIUM PHOSPHATE, DIBASIC, AND POTASSIUM PHOSPHATE 500 ML: .53; .5; .37; .037; .03; .012; .00082 INJECTION, SOLUTION INTRAVENOUS at 13:12

## 2023-01-01 RX ADMIN — OXYCODONE HYDROCHLORIDE 7.5 MG: 5 SOLUTION ORAL at 18:06

## 2023-01-01 RX ADMIN — CEFTRIAXONE SODIUM 1000 MG: 10 INJECTION, POWDER, FOR SOLUTION INTRAVENOUS at 20:56

## 2023-01-01 RX ADMIN — HEPARIN SODIUM 5000 UNITS: 5000 INJECTION INTRAVENOUS; SUBCUTANEOUS at 13:26

## 2023-01-01 RX ADMIN — DEXTROSE MONOHYDRATE 25 ML: 25 INJECTION, SOLUTION INTRAVENOUS at 20:16

## 2023-01-01 RX ADMIN — SODIUM PHOSPHATE, MONOBASIC, MONOHYDRATE AND SODIUM PHOSPHATE, DIBASIC, ANHYDROUS 6 MMOL: 142; 276 INJECTION, SOLUTION INTRAVENOUS at 09:31

## 2023-01-01 RX ADMIN — VANCOMYCIN HYDROCHLORIDE 1500 MG: 10 INJECTION, POWDER, LYOPHILIZED, FOR SOLUTION INTRAVENOUS at 20:45

## 2023-01-01 RX ADMIN — Medication 10 MG: at 08:43

## 2023-01-01 RX ADMIN — MIDAZOLAM HYDROCHLORIDE 0.5 MG: 2 INJECTION, SOLUTION INTRAMUSCULAR; INTRAVENOUS at 19:30

## 2023-01-01 RX ADMIN — NOREPINEPHRINE BITARTRATE 15 MCG/MIN: 1 INJECTION, SOLUTION, CONCENTRATE INTRAVENOUS at 09:15

## 2023-01-01 RX ADMIN — NOREPINEPHRINE BITARTRATE 12 MCG/MIN: 1 INJECTION, SOLUTION, CONCENTRATE INTRAVENOUS at 04:49

## 2023-01-01 RX ADMIN — LIDOCAINE 5% 1 PATCH: 700 PATCH TOPICAL at 09:26

## 2023-01-01 RX ADMIN — CEFEPIME 2000 MG: 2 INJECTION, POWDER, FOR SOLUTION INTRAVENOUS at 18:16

## 2023-01-01 RX ADMIN — HYDROMORPHONE HYDROCHLORIDE 0.5 MG: 1 INJECTION, SOLUTION INTRAMUSCULAR; INTRAVENOUS; SUBCUTANEOUS at 16:34

## 2023-01-01 RX ADMIN — SODIUM BICARBONATE 50 MEQ: 84 INJECTION INTRAVENOUS at 23:00

## 2023-01-01 RX ADMIN — HYDROMORPHONE HYDROCHLORIDE 0.2 MG: 0.2 INJECTION, SOLUTION INTRAMUSCULAR; INTRAVENOUS; SUBCUTANEOUS at 23:37

## 2023-01-01 RX ADMIN — CHLORHEXIDINE GLUCONATE 15 ML: 1.2 SOLUTION ORAL at 08:05

## 2023-01-01 RX ADMIN — HYDROMORPHONE HYDROCHLORIDE 0.2 MG: 0.2 INJECTION, SOLUTION INTRAMUSCULAR; INTRAVENOUS; SUBCUTANEOUS at 05:38

## 2023-01-01 RX ADMIN — HYDROMORPHONE HYDROCHLORIDE 0.2 MG: 0.2 INJECTION, SOLUTION INTRAMUSCULAR; INTRAVENOUS; SUBCUTANEOUS at 09:49

## 2023-01-01 RX ADMIN — SODIUM CHLORIDE 50 ML/HR: 0.9 INJECTION, SOLUTION INTRAVENOUS at 16:37

## 2023-01-01 RX ADMIN — INSULIN LISPRO 6 UNITS: 100 INJECTION, SOLUTION INTRAVENOUS; SUBCUTANEOUS at 12:04

## 2023-01-01 RX ADMIN — HYDROMORPHONE HYDROCHLORIDE 0.5 MG: 1 INJECTION, SOLUTION INTRAMUSCULAR; INTRAVENOUS; SUBCUTANEOUS at 23:53

## 2023-01-01 RX ADMIN — CEFTRIAXONE SODIUM 2000 MG: 10 INJECTION, POWDER, FOR SOLUTION INTRAVENOUS at 12:22

## 2023-01-01 RX ADMIN — HYDROMORPHONE HYDROCHLORIDE 0.5 MG: 1 INJECTION, SOLUTION INTRAMUSCULAR; INTRAVENOUS; SUBCUTANEOUS at 08:21

## 2023-01-01 RX ADMIN — INSULIN LISPRO 2 UNITS: 100 INJECTION, SOLUTION INTRAVENOUS; SUBCUTANEOUS at 12:16

## 2023-01-01 RX ADMIN — CEFTRIAXONE SODIUM 1000 MG: 10 INJECTION, POWDER, FOR SOLUTION INTRAVENOUS at 21:02

## 2023-01-01 RX ADMIN — VASOPRESSIN 0.04 UNITS/MIN: 20 INJECTION, SOLUTION INTRAMUSCULAR; SUBCUTANEOUS at 14:50

## 2023-01-01 RX ADMIN — Medication 20000 ML: at 14:30

## 2023-01-01 RX ADMIN — SODIUM BICARBONATE 150 ML/HR: 84 INJECTION, SOLUTION INTRAVENOUS at 21:12

## 2023-01-01 RX ADMIN — CALCIUM GLUCONATE 2 G: 20 INJECTION, SOLUTION INTRAVENOUS at 06:10

## 2023-01-01 RX ADMIN — SODIUM CHLORIDE 1000 ML: 0.9 INJECTION, SOLUTION INTRAVENOUS at 20:26

## 2023-01-01 RX ADMIN — NYSTATIN: 100000 POWDER TOPICAL at 11:52

## 2023-01-01 RX ADMIN — PANTOPRAZOLE SODIUM 20 MG: 20 TABLET, DELAYED RELEASE ORAL at 05:48

## 2023-01-01 RX ADMIN — HEPARIN SODIUM 5000 UNITS: 5000 INJECTION INTRAVENOUS; SUBCUTANEOUS at 05:55

## 2023-01-01 RX ADMIN — HYDROMORPHONE HYDROCHLORIDE 0.2 MG: 0.2 INJECTION, SOLUTION INTRAMUSCULAR; INTRAVENOUS; SUBCUTANEOUS at 10:07

## 2023-01-01 RX ADMIN — SODIUM PHOSPHATE, MONOBASIC, MONOHYDRATE AND SODIUM PHOSPHATE, DIBASIC, ANHYDROUS 6 MMOL: 142; 276 INJECTION, SOLUTION INTRAVENOUS at 15:08

## 2023-01-01 RX ADMIN — INSULIN LISPRO 2 UNITS: 100 INJECTION, SOLUTION INTRAVENOUS; SUBCUTANEOUS at 00:34

## 2023-01-01 RX ADMIN — INSULIN LISPRO 4 UNITS: 100 INJECTION, SOLUTION INTRAVENOUS; SUBCUTANEOUS at 18:17

## 2023-01-01 RX ADMIN — HEPARIN SODIUM 5000 UNITS: 5000 INJECTION INTRAVENOUS; SUBCUTANEOUS at 13:32

## 2023-01-01 RX ADMIN — HYDROMORPHONE HYDROCHLORIDE 0.2 MG: 0.2 INJECTION, SOLUTION INTRAMUSCULAR; INTRAVENOUS; SUBCUTANEOUS at 13:25

## 2023-01-01 RX ADMIN — CEFTRIAXONE SODIUM 2000 MG: 10 INJECTION, POWDER, FOR SOLUTION INTRAVENOUS at 10:13

## 2023-01-01 RX ADMIN — MIDAZOLAM 0.5 MG: 1 INJECTION INTRAMUSCULAR; INTRAVENOUS at 18:46

## 2023-01-01 RX ADMIN — CEFEPIME 2000 MG: 2 INJECTION, POWDER, FOR SOLUTION INTRAVENOUS at 05:40

## 2023-01-01 RX ADMIN — VASOPRESSIN 0.04 UNITS/MIN: 20 INJECTION, SOLUTION INTRAMUSCULAR; SUBCUTANEOUS at 00:30

## 2023-01-01 RX ADMIN — PANTOPRAZOLE SODIUM 40 MG: 40 INJECTION, POWDER, FOR SOLUTION INTRAVENOUS at 08:46

## 2023-01-01 RX ADMIN — CEFEPIME 2000 MG: 2 INJECTION, POWDER, FOR SOLUTION INTRAVENOUS at 06:18

## 2023-01-01 RX ADMIN — VANCOMYCIN HYDROCHLORIDE 1000 MG: 1 INJECTION, SOLUTION INTRAVENOUS at 09:00

## 2023-01-01 RX ADMIN — LIDOCAINE 5% 1 PATCH: 700 PATCH TOPICAL at 10:10

## 2023-01-01 RX ADMIN — VANCOMYCIN HYDROCHLORIDE 1000 MG: 1 INJECTION, SOLUTION INTRAVENOUS at 22:00

## 2023-01-01 RX ADMIN — HYDROMORPHONE HYDROCHLORIDE 0.2 MG: 0.2 INJECTION, SOLUTION INTRAMUSCULAR; INTRAVENOUS; SUBCUTANEOUS at 16:07

## 2023-01-01 RX ADMIN — METOROPROLOL TARTRATE 2.5 MG: 5 INJECTION, SOLUTION INTRAVENOUS at 09:34

## 2023-01-01 RX ADMIN — CHLORHEXIDINE GLUCONATE 15 ML: 1.2 SOLUTION ORAL at 20:17

## 2023-01-01 RX ADMIN — CEFEPIME 1000 MG: 1 INJECTION, POWDER, FOR SOLUTION INTRAMUSCULAR; INTRAVENOUS at 06:15

## 2023-01-01 RX ADMIN — FENTANYL CITRATE 25 MCG: 50 INJECTION, SOLUTION INTRAMUSCULAR; INTRAVENOUS at 18:46

## 2023-01-01 RX ADMIN — SODIUM PHOSPHATE, MONOBASIC, MONOHYDRATE AND SODIUM PHOSPHATE, DIBASIC, ANHYDROUS 9 MMOL: 142; 276 INJECTION, SOLUTION INTRAVENOUS at 16:11

## 2023-01-01 RX ADMIN — HYDROMORPHONE HYDROCHLORIDE 0.5 MG: 1 INJECTION, SOLUTION INTRAMUSCULAR; INTRAVENOUS; SUBCUTANEOUS at 07:36

## 2023-01-01 RX ADMIN — Medication 10 MG: at 08:16

## 2023-01-01 RX ADMIN — ALBUTEROL SULFATE 5 MG: 2.5 SOLUTION RESPIRATORY (INHALATION) at 17:15

## 2023-01-01 RX ADMIN — GLYCOPYRROLATE 0.2 MG: 0.2 INJECTION, SOLUTION INTRAMUSCULAR; INTRAVENOUS at 21:30

## 2023-01-01 RX ADMIN — ACETAMINOPHEN 650 MG: 650 SUSPENSION ORAL at 05:55

## 2023-01-01 RX ADMIN — PANTOPRAZOLE SODIUM 40 MG: 40 INJECTION, POWDER, FOR SOLUTION INTRAVENOUS at 20:17

## 2023-01-01 RX ADMIN — INSULIN LISPRO 2 UNITS: 100 INJECTION, SOLUTION INTRAVENOUS; SUBCUTANEOUS at 18:05

## 2023-01-01 RX ADMIN — NOREPINEPHRINE BITARTRATE 19 MCG/MIN: 1 INJECTION, SOLUTION, CONCENTRATE INTRAVENOUS at 19:00

## 2023-01-01 RX ADMIN — HYDROMORPHONE HYDROCHLORIDE 0.2 MG: 0.2 INJECTION, SOLUTION INTRAMUSCULAR; INTRAVENOUS; SUBCUTANEOUS at 01:29

## 2023-01-03 ENCOUNTER — HOME CARE VISIT (OUTPATIENT)
Dept: HOME HEALTH SERVICES | Facility: HOME HEALTHCARE | Age: 88
End: 2023-01-03

## 2023-01-03 VITALS
RESPIRATION RATE: 14 BRPM | TEMPERATURE: 96.7 F | OXYGEN SATURATION: 98 % | DIASTOLIC BLOOD PRESSURE: 62 MMHG | SYSTOLIC BLOOD PRESSURE: 118 MMHG | HEART RATE: 71 BPM

## 2023-01-03 NOTE — CASE COMMUNICATION
TC from IR Physician Isaac Corona  Sent tiger connect photo of patients Gail bag  Per Dr Vignesh Rm patient will be called to schedule a Gail tube check in IR asap

## 2023-01-03 NOTE — CASE COMMUNICATION
TC to [facility] IR left message with Luiz Ovalle to report last wee3-4 dfays there is thick tan mucus in drainage

## 2023-01-05 ENCOUNTER — HOME CARE VISIT (OUTPATIENT)
Dept: HOME HEALTH SERVICES | Facility: HOME HEALTHCARE | Age: 88
End: 2023-01-05

## 2023-01-05 VITALS
RESPIRATION RATE: 18 BRPM | TEMPERATURE: 97.8 F | DIASTOLIC BLOOD PRESSURE: 78 MMHG | HEART RATE: 74 BPM | SYSTOLIC BLOOD PRESSURE: 122 MMHG | OXYGEN SATURATION: 98 %

## 2023-01-06 ENCOUNTER — HOSPITAL ENCOUNTER (OUTPATIENT)
Dept: RADIOLOGY | Facility: HOSPITAL | Age: 88
Discharge: HOME | End: 2023-01-06
Attending: RADIOLOGY

## 2023-01-06 DIAGNOSIS — K81.0 ACUTE CHOLECYSTITIS: ICD-10-CM

## 2023-01-06 DIAGNOSIS — Z43.4 CHOLECYSTOSTOMY CARE (HCC): Primary | ICD-10-CM

## 2023-01-06 RX ADMIN — IOHEXOL 7 ML: 350 INJECTION, SOLUTION INTRAVENOUS at 10:36

## 2023-01-06 NOTE — SEDATION DOCUMENTATION
Cholecystostomy tube check performed by ED [facility] NP  Patient tolerated well  Flush once per day with 5 mL  Patient assisted back to wheelchair and transported to waiting room

## 2023-01-06 NOTE — BRIEF OP NOTE (RAD/CATH)
IR CHOLECYSTOSTOMY TUBE CHECK/CHANGE/REPOSITION/REINSERTION/UPSIZE Procedure Note    PATIENT NAME: Travis Pedro  : 1935  MRN: [id number]    Pre-op Diagnosis:   1  Acute cholecystitis      Post-op Diagnosis:   1  Acute cholecystitis        Provider:   Contreras Christy  Assistants:     No qualified resident was available, Resident is only observing    Estimated Blood Loss: none    Findings: cholecystomy with filling through cystic duct and CBD to duodenum  Fistula to hepatic flexure  Recommend capping bag and flushing once a day with 5 mL normal saline      Specimens: none    Complications:  None immediate     Anesthesia: none    ODESSA Diaz     Date: 2023  Time: 10:49 AM

## 2023-01-06 NOTE — DISCHARGE INSTRUCTIONS
TUBE CARE INSTRUCTIONS    Care after your procedure:    Resume your normal diet  Small sips of flat soda will help with nausea  1  The properly functioning catheter should be forward flushed once (1x) daily with 10ml of normal saline using clean technique  You will be given a prescription for flushes  To flush the tube, clean both connections with alcohol swab  Twist off the drainage bag/ bulb  tubing and twist the saline syringe into the drainage tube and flush  Remove the syringe and twist the drainage bag / bulb tubing tubing back on     2  The drainage bag/bulb may be emptied as necessary  Keep a record of the amount of fluid you drain from your tube  This should be done with clean technique as well  3  A fresh dressing should be applied daily over the tube insertion site  4  As the tube is secured to the skin with only a suture,try not to pull on your tube  Tub baths are not permitted  Showers are permitted if the patient's skin entry site is prevented from getting wet  Similarly, washcloth "baths" are acceptable  Contact Interventional Radiology at [phone] Dennys PATIENTS: Contact Interventional Radiology at [phone]) Conrad De Dios PATIENTS: Contact Interventional Radiology at [phone]) if:    1  Leakage or large amounts of liquid around the catheter  2  Fever of 101 degrees lasting several hours without other obvious cause (such as sore throat, flu, etc)  3  Persistent nausea or vomiting  4  Diminished drainage, which may be associated with pressure or pain  Or when the     drainage from your tube is less than 10mls for 48 hours  5  Catheter pulled back or falls out  The following pharmacies carry the flush syringes         [facility]                     [facility]  [address]                         [address] [facility]  Phone [phone]            Phone [phone]   [place] [address]                                [phone]  [address] [facility] [address]                      [address] [place]  Phone [phone]            Phone [phone]                      [facility]                                                                                                          [phone]  [facility]  [facility] [address]    [address]  Phone [phone]

## 2023-01-09 ENCOUNTER — HOME CARE VISIT (OUTPATIENT)
Dept: HOME HEALTH SERVICES | Facility: HOME HEALTHCARE | Age: 88
End: 2023-01-09

## 2023-01-09 VITALS
OXYGEN SATURATION: 95 % | DIASTOLIC BLOOD PRESSURE: 58 MMHG | RESPIRATION RATE: 17 BRPM | SYSTOLIC BLOOD PRESSURE: 122 MMHG | HEART RATE: 76 BPM | TEMPERATURE: 97.6 F

## 2023-01-12 ENCOUNTER — HOME CARE VISIT (OUTPATIENT)
Dept: HOME HEALTH SERVICES | Facility: HOME HEALTHCARE | Age: 88
End: 2023-01-12

## 2023-01-12 VITALS
HEART RATE: 78 BPM | OXYGEN SATURATION: 94 % | DIASTOLIC BLOOD PRESSURE: 78 MMHG | RESPIRATION RATE: 18 BRPM | SYSTOLIC BLOOD PRESSURE: 128 MMHG | TEMPERATURE: 97.9 F

## 2023-01-13 ENCOUNTER — OFFICE VISIT (OUTPATIENT)
Dept: WOUND CARE | Facility: HOSPITAL | Age: 88
End: 2023-01-13

## 2023-01-13 VITALS
DIASTOLIC BLOOD PRESSURE: 72 MMHG | SYSTOLIC BLOOD PRESSURE: 150 MMHG | RESPIRATION RATE: 12 BRPM | HEART RATE: 75 BPM | TEMPERATURE: 97 F

## 2023-01-13 DIAGNOSIS — E11.40 TYPE 2 DIABETES MELLITUS WITH DIABETIC NEUROPATHY, WITHOUT LONG-TERM CURRENT USE OF INSULIN (HCC): ICD-10-CM

## 2023-01-13 DIAGNOSIS — I87.311 CHRONIC VENOUS HYPERTENSION (IDIOPATHIC) WITH ULCER OF RIGHT LOWER EXTREMITY (HCC): Primary | ICD-10-CM

## 2023-01-13 DIAGNOSIS — L97.312 NON-PRESSURE CHRONIC ULCER OF RIGHT ANKLE WITH FAT LAYER EXPOSED (HCC): ICD-10-CM

## 2023-01-13 DIAGNOSIS — L97.919 CHRONIC VENOUS HYPERTENSION (IDIOPATHIC) WITH ULCER OF RIGHT LOWER EXTREMITY (HCC): Primary | ICD-10-CM

## 2023-01-13 RX ORDER — LIDOCAINE 40 MG/G
CREAM TOPICAL ONCE
Status: COMPLETED | OUTPATIENT
Start: 2023-01-13 | End: 2023-01-13

## 2023-01-13 RX ADMIN — LIDOCAINE 1 APPLICATION: 40 CREAM TOPICAL at 14:32

## 2023-01-13 NOTE — PATIENT INSTRUCTIONS
Orders Placed This Encounter   Procedures    Wound cleansing and dressings     Wash your hands with soap and water  Remove old dressing, discard into plastic bag and place in trash  Cleanse the wound with NSS prior to applying a clean dressing  Do not use tissue or cotton balls  Do not scrub the wound  Pat dry using gauze  Shower yes     Apply moisturi to skin surrounding wound  Apply silver alginate to the wound  Cover with guaze   Secure with juanjo and tape    Change dressing 3 times a week: Dressing change may be done: 2 times by VNA and the other by patient or family member    Treatment was done as above  Standing Status:   Future     Standing Expiration Date:   1/13/2024    Wound compression and edema control     Compression Stocking   spandagrip size G:    Remove compression stockings every night HS and re-apply first thing qAM  Follow daily skin care as instructed  Avoid prolonged standing in one place  Use compression pump at home  Elevate leg(s) above the level of the heart when sitting or as much as possible       Standing Status:   Future     Standing Expiration Date:   1/13/2024    Wound home care     Continue VNA to change the wound dressing 2 times a week     Standing Status:   Future     Standing Expiration Date:   1/13/2024    Wound miscellaneous orders     Consume 3-4 servings protein daily     Standing Status:   Future     Standing Expiration Date:   1/13/2024    Debridement     This order was created via procedure documentation

## 2023-01-13 NOTE — PROGRESS NOTES
Patient ID: Silvio Suarez is a 81 y o  male Date of Birth 1935     Chief Complaint  Chief Complaint   Patient presents with   • Follow Up Wound Care Visit     RLE wound       Allergies  Tramadol    Assessment:    Type 2 diabetes mellitus with diabetic neuropathy, without long-term current use of insulin ([facility])    Lab Results   Component Value Date    HGBA1C 6 3 (H) 03/01/2022        Diagnoses and all orders for this visit:    Chronic venous hypertension (idiopathic) with ulcer of right lower extremity ([facility])  -     lidocaine (LMX) 4 % cream  -     Wound cleansing and dressings; Future  -     Wound compression and edema control; Future  -     Wound home care; Future  -     Wound miscellaneous orders; Future  -     Debridement    Non-pressure chronic ulcer of right ankle with fat layer exposed ([address] )  -     lidocaine (LMX) 4 % cream  -     Wound cleansing and dressings; Future  -     Wound compression and edema control; Future  -     Wound home care; Future  -     Wound miscellaneous orders; Future    Type 2 diabetes mellitus with diabetic neuropathy, without long-term current use of insulin ([address] )              Debridement   Wound 01/13/23 Ankle Right;Medial    Universal Protocol:  Consent: Verbal consent obtained  Consent given by: patient  Time out: Immediately prior to procedure a "time out" was called to verify the correct patient, procedure, equipment, support staff and site/side marked as required    Timeout called at: 1/13/2023 1:20 PM   Patient understanding: patient states understanding of the procedure being performed  Patient identity confirmed: verbally with patient      Performed by: physician  Debridement type: surgical  Level of debridement: subcutaneous tissue  Pain control: lidocaine 4%  Post-debridement measurements  Length (cm): 2 9  Width (cm): 2 5  Depth (cm): 0 4  Percent debrided: 100%  Surface Area (cm^2): 7 25  Area debrided (cm^2): 7 25  Volume (cm^3): 2 9  Tissue and other material debrided: subcutaneous tissue  Devitalized tissue debrided: exudate and slough  Instrument(s) utilized: curette  Bleeding: small  Hemostasis obtained with: pressure  Procedural pain (0-10): 0  Post-procedural pain: 0   Response to treatment: procedure was tolerated well          Plan:  Initial evaluation  Wound debrided as above  Wound management with silver alginate, see wound orders below  Compression with Tubigrip  No harsh cleansers such as alcohol, peroxide, or antibacterial soap  Never leave wound open to air  Do not submerge in any body water such as bathtub, hot tub, swimming pool, etc   Keep legs elevated whenever seated and avoid prolonged standing  Discussed the importance of tight diabetic control  A1C results reviewed with the patient today  Follow-up in 1 week or call sooner with questions or concerns    Wound 01/13/23 Ankle Right;Medial (Active)   Wound Image Images linked 01/13/23 1423   Wound Description Yellow;Pink;Slough; White 01/13/23 1308   Deedee-wound Assessment Edema; Maceration;[place] 01/13/23 1308   Wound Length (cm) 2 9 cm 01/13/23 1308   Wound Width (cm) 2 5 cm 01/13/23 1308   Wound Depth (cm) 0 3 cm 01/13/23 1308   Wound Surface Area (cm^2) 7 25 cm^2 01/13/23 1308   Wound Volume (cm^3) 2 175 cm^3 01/13/23 1308   Calculated Wound Volume (cm^3) 2 18 cm^3 01/13/23 1308   Drainage Amount Moderate 01/13/23 1308   Drainage Description Serosanguineous 01/13/23 1308   Non-staged Wound Description Full thickness 01/13/23 1308   Patient Tolerance Tolerated well 01/13/23 1308   Dressing Status Intact 01/13/23 1308       Wound 12/16/22 Other (comment) Leg Inner;Right (Active)   Date First Assessed/Time First Assessed: 12/16/22 0909   Pre-Existing Wound: No  Primary Wound Type: Other (comment)  Location: Leg  Wound Location Orientation: Inner;Right  Dressing Status: Clean;Dry; Intact       Wound 01/13/23 Ankle Right;Medial (Active)   Date First Assessed/Time First Assessed: 01/13/23 1308   Location: Ankle  Wound Location Orientation: Right;Medial       Subjective:        Patient presents for initial evaluation of a wound of the right ankle that has been present for approximately the past 4 to 5 months  Has been using alginate on the wound and wearing his compression stockings which she states are 20 to 30 mmHg  He states that he does elevate his legs often  He also has compression pumps and reports that he is getting back into the routine of the doing the pumps once daily  Patient is a non-insulin-dependent diabetic, last A1c was 6 1  No smoking  The following portions of the patient's history were reviewed and updated as appropriate:   He  has a past medical history of Anemia, Arthritis, Atrial fibrillation ([address] ), Basal cell carcinoma (03/22/2022), CHF (congestive heart failure) ([facility] ), Diabetes mellitus ([facility] ), DVT (deep vein thrombosis) in pregnancy (1966), DVT (deep venous thrombosis) ([facility] ) (1966), Dyslipidemia, Encephalopathy acute (11/4/2022), GERD (gastroesophageal reflux disease), Hyperlipidemia, Hypertension, Hypomagnesemia (10/19/2022), Irregular heart beat, Morbid obesity due to excess calories ([address] ), Pulmonary embolism ([facility] ), Sepsis ([facility] ), Squamous cell skin cancer (07/30/2020), and Visual impairment    He   Patient Active Problem List    Diagnosis Date Noted   • Cholecystostomy care [facility]) 11/21/2022   • Physical deconditioning 11/21/2022   • Gout 11/21/2022   • Insomnia due to medical condition 11/21/2022   • Leg pain 11/07/2022   • Acute on chronic cholecystitis 11/04/2022   • Acute cholecystitis 08/30/2022   • Benign prostatic hyperplasia with lower urinary tract symptoms 08/09/2022   • OBINNA (obstructive sleep apnea)    • CKD (chronic kidney disease) stage 3, GFR 30-59 ml/min ([address] ) 05/07/2022   • Acute kidney injury ([facility] ) 11/18/2021   • Osteoarthritis, knee 11/15/2021   • Status post right knee replacement 11/15/2021   • Mixed hyperlipidemia 03/29/2021   • Swelling of limb 02/04/2021   • Chronic deep vein thrombosis (DVT) ([facility]) 09/10/2020   • Secondary lymphedema 09/24/2018   • Venous ulcer of ankle, right ([facility] ) 07/11/2018   • S/P AVR 03/22/2018   • Thrombophlebitis 03/22/2018   • Presence of implantable cardioverter-defibrillator (ICD) 03/22/2018   • Chronic bilateral low back pain without sciatica 04/24/2017   • Compression fracture of L4 lumbar vertebra 04/24/2017   • BPH (benign prostatic hyperplasia) 04/20/2017   • Obesity, unspecified obesity severity, unspecified obesity type 04/04/2017   • Hydrocele 04/04/2017   • Chronic anticoagulation 04/04/2017   • Anemia 04/04/2017   • Renal cyst 04/04/2017   • Epididymitis 04/02/2017   • Cellulitis of scrotum 04/02/2017   • Dental abscess 12/26/2016   • 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 08/11/2015   • Ventricular tachycardia 04/15/2014   • Paroxysmal atrial fibrillation ([place] [address] ) 03/19/2014   • Edema 03/19/2014   • Chronic diastolic CHF (congestive heart failure) ([place] [address] ) 03/19/2014   • Endocarditis 03/19/2014   • Type 2 diabetes mellitus with diabetic neuropathy, without long-term current use of insulin ([place] [address] ) 08/19/2013   • Peripheral venous insufficiency 08/19/2013   • Cardiomegaly 08/19/2013   • PVD (peripheral vascular disease) ([place] [address] ) 04/19/2013   • Dyslipidemia 04/19/2013     He  has a past surgical history that includes Vascular surgery; Aortic valve replacement; Total knee arthroplasty (Left); Cardiac surgery (02/2014); Insert / replace / remove pacemaker; Cardiac defibrillator placement (04/2014); Joint replacement (Left); [place] tooth extraction; Colonoscopy; pr ligj divj&strip long saph saphfem junct kne/belw (Right, 8/17/2018); Vena cava filter placement; Replacement total knee (Right); Mohs surgery (07/30/2020); Mohs surgery (04/18/2022); IR cholecystostomy tube placement (9/1/2022); IR cholecystostomy tube check/change/reposition/reinsertion/upsize (10/13/2022);  IR cholecystostomy tube check/change/reposition/reinsertion/upsize (10/19/2022); IR cholecystostomy tube check/change/reposition/reinsertion/upsize (10/27/2022); IR cholecystostomy tube check/change/reposition/reinsertion/upsize (11/7/2022); IR cholecystostomy tube check/change/reposition/reinsertion/upsize (11/10/2022); IR cholecystostomy tube check/change/reposition/reinsertion/upsize (12/1/2022); and IR cholecystostomy tube check/change/reposition/reinsertion/upsize (1/6/2023)  He  reports that he has never smoked  He has never used smokeless tobacco  He reports that he does not currently use alcohol  He reports that he does not use drugs  Current Outpatient Medications   Medication Sig Dispense Refill   • Acetaminophen (TYLENOL EXTRA STRENGTH PO) Take 500 mg by mouth if needed (for pain as needed)  Indications: pain as needed     • albuterol (PROVENTIL HFA,VENTOLIN HFA) 90 mcg/act inhaler Inhale 2 puffs every 6 (six) hours as needed for wheezing or shortness of breath     • allopurinol (ZYLOPRIM) 100 mg tablet Take 1 tablet (100 mg total) by mouth daily Do not start before November 15, 2022  • atorvastatin (LIPITOR) 40 mg tablet Take 40 mg by mouth daily after dinner Atorvastatin Calcium 40 MG Oral Tablet Take 1 tablet daily  Refills: 0  Active      • B Complex-C (SUPER B COMPLEX PO) Take 1 capsule by mouth daily      • Diclofenac Sodium (VOLTAREN) 1 % Apply 2 g topically 4 (four) times a day     • fluticasone (FLONASE) 50 mcg/act nasal spray 2 sprays into each nostril daily as needed Shake liquid and spray     • gabapentin (NEURONTIN) 100 mg capsule Take 100 mg by mouth daily     • Iron Combinations (NIFEREX) TABS Take 1 tablet by mouth in the morning  5   • metFORMIN (GLUCOPHAGE) 1000 MG tablet Take 1,000 mg by mouth daily with dinner   Indications: Type 2 Diabetes     • mirtazapine (REMERON) 7 5 MG tablet Take 15 mg by mouth daily at bedtime ordered by Dr Beckie Hurtado     • Multiple Vitamin (MULTIVITAMINS PO) Take 1 tablet by mouth daily     • omeprazole (PriLOSEC) 20 mg delayed release capsule Omeprazole 20 MG Oral Tablet Delayed Release Take 1 tablet daily  Refills: 0  Active     • polyethylene glycol (GLYCOLAX) 17 GM/SCOOP powder Take 17 g by mouth 2 (two) times a day     • sodium chloride, PF, 0 9 % 5 mL by Intracatheter route daily Intracatheter flushing daily 150 mL 1   • sodium chloride, PF, 0 9 % 10 mL by Intracatheter route daily Intracatheter flushing daily  May substitute prefilled syringe with normal saline 10 mL vials, 10 mL syringes, and 18 g blunt needles (Patient taking differently: 10 mL by Intracatheter route every 12 (twelve) hours  Intracatheter flushing daily  May substitute prefilled syringe with normal saline 10 mL vials, 10 mL syringes, and 18 g blunt needles  Indications: FLUSHING) 300 mL 2   • sotalol (BETAPACE) 80 mg tablet Take 1 tablet (80 mg total) by mouth daily  0   • torsemide (DEMADEX) 20 mg tablet Take 20 mg by mouth daily and 10 mg in PM     • warfarin (COUMADIN) 2 mg tablet Take 2 tablets (4 mg total) by mouth daily Acknowledge 14 tablet 0     No current facility-administered medications for this visit  He is allergic to tramadol       Review of Systems   Constitutional: Negative for chills and fever  HENT: Negative for congestion and sneezing  Respiratory: Negative for cough  Cardiovascular: Positive for leg swelling  Musculoskeletal: Positive for gait problem  Skin: Positive for wound  Psychiatric/Behavioral: Negative for agitation  Objective:       Wound 01/13/23 Ankle Right;Medial (Active)   Wound Image Images linked 01/13/23 2429   Wound Description Yellow;Pink;Slough; White 01/13/23 1308   Deedee-wound Assessment Edema; Maceration;[place] 01/13/23 1308   Wound Length (cm) 2 9 cm 01/13/23 1308   Wound Width (cm) 2 5 cm 01/13/23 1308   Wound Depth (cm) 0 3 cm 01/13/23 1308   Wound Surface Area (cm^2) 7 25 cm^2 01/13/23 1308   Wound Volume (cm^3) 2 175 cm^3 01/13/23 1308 Calculated Wound Volume (cm^3) 2 18 cm^3 01/13/23 1308   Drainage Amount Moderate 01/13/23 1308   Drainage Description Serosanguineous 01/13/23 1308   Non-staged Wound Description Full thickness 01/13/23 1308   Patient Tolerance Tolerated well 01/13/23 1308   Dressing Status Intact 01/13/23 1308       /72   Pulse 75   Temp (!) 97 °F (36 1 °C)   Resp 12     Physical Exam  Vitals reviewed  Constitutional:       General: He is not in acute distress  Appearance: Normal appearance  He is obese  He is not ill-appearing, toxic-appearing or diaphoretic  HENT:      Head: Normocephalic and atraumatic  Right Ear: External ear normal       Left Ear: External ear normal    Eyes:      Conjunctiva/sclera: Conjunctivae normal    Pulmonary:      Effort: Pulmonary effort is normal  No respiratory distress  Musculoskeletal:      Cervical back: Neck supple  Right lower leg: Edema present  Left lower leg: Edema present  Skin:     Comments: See wound assessment   Neurological:      Mental Status: He is alert  Psychiatric:         Mood and Affect: Mood normal          Behavior: Behavior normal            Wound 12/16/22 Other (comment) Leg Inner;Right (Active)       Wound 01/13/23 Ankle Right;Medial (Active)   Wound Image   01/13/23 1308   Wound Description Yellow;Pink;Slough; White 01/13/23 1308   Deedee-wound Assessment Edema; Maceration;[place] 01/13/23 1308   Wound Length (cm) 2 9 cm 01/13/23 1308   Wound Width (cm) 2 5 cm 01/13/23 1308   Wound Depth (cm) 0 3 cm 01/13/23 1308   Wound Surface Area (cm^2) 7 25 cm^2 01/13/23 1308   Wound Volume (cm^3) 2 175 cm^3 01/13/23 1308   Calculated Wound Volume (cm^3) 2 18 cm^3 01/13/23 1308   Drainage Amount Moderate 01/13/23 1308   Drainage Description Serosanguineous 01/13/23 1308   Non-staged Wound Description Full thickness 01/13/23 1308   Patient Tolerance Tolerated well 01/13/23 1308   Dressing Status Intact 01/13/23 1308                         Wound Instructions:  Orders Placed This Encounter   Procedures   • Wound cleansing and dressings     Wash your hands with soap and water  Remove old dressing, discard into plastic bag and place in trash  Cleanse the wound with NSS prior to applying a clean dressing  Do not use tissue or cotton balls  Do not scrub the wound  Pat dry using gauze  Shower yes     Apply moisturi to skin surrounding wound  Apply silver alginate to the wound  Cover with guaze   Secure with juanjo and tape    Change dressing 3 times a week: Dressing change may be done: 2 times by VNA and the other by patient or family member    Treatment was done as above  Standing Status:   Future     Standing Expiration Date:   1/13/2024   • Wound compression and edema control     Compression Stocking   spandagrip size G:    Remove compression stockings every night HS and re-apply first thing qAM  Follow daily skin care as instructed  Avoid prolonged standing in one place  Use compression pump at home  Elevate leg(s) above the level of the heart when sitting or as much as possible  Standing Status:   Future     Standing Expiration Date:   1/13/2024   • Wound home care     Continue VNA to change the wound dressing 2 times a week     Standing Status:   Future     Standing Expiration Date:   1/13/2024   • Wound miscellaneous orders     Consume 3-4 servings protein daily     Standing Status:   Future     Standing Expiration Date:   1/13/2024   • Debridement     This order was created via procedure documentation        Diagnosis ICD-10-CM Associated Orders   1  Chronic venous hypertension (idiopathic) with ulcer of right lower extremity (HCC)  I87 311 lidocaine (LMX) 4 % cream    L97 919 Wound cleansing and dressings     Wound compression and edema control     Wound home care     Wound miscellaneous orders     Debridement      2   Non-pressure chronic ulcer of right ankle with fat layer exposed ([facility] )  L97 312 lidocaine (LMX) 4 % cream     Wound cleansing and dressings     Wound compression and edema control     Wound home care     Wound miscellaneous orders      3   Type 2 diabetes mellitus with diabetic neuropathy, without long-term current use of insulin (HCC)  E11 40

## 2023-01-14 ENCOUNTER — HOME CARE VISIT (OUTPATIENT)
Dept: HOME HEALTH SERVICES | Facility: HOME HEALTHCARE | Age: 88
End: 2023-01-14

## 2023-01-17 ENCOUNTER — HOME CARE VISIT (OUTPATIENT)
Dept: HOME HEALTH SERVICES | Facility: HOME HEALTHCARE | Age: 88
End: 2023-01-17

## 2023-01-17 VITALS
TEMPERATURE: 97.8 F | OXYGEN SATURATION: 99 % | HEART RATE: 82 BPM | DIASTOLIC BLOOD PRESSURE: 62 MMHG | SYSTOLIC BLOOD PRESSURE: 128 MMHG | RESPIRATION RATE: 18 BRPM

## 2023-01-19 ENCOUNTER — HOME CARE VISIT (OUTPATIENT)
Dept: HOME HEALTH SERVICES | Facility: HOME HEALTHCARE | Age: 88
End: 2023-01-19

## 2023-01-19 VITALS
TEMPERATURE: 97.9 F | RESPIRATION RATE: 18 BRPM | DIASTOLIC BLOOD PRESSURE: 88 MMHG | SYSTOLIC BLOOD PRESSURE: 130 MMHG | OXYGEN SATURATION: 99 % | HEART RATE: 74 BPM

## 2023-01-20 ENCOUNTER — OFFICE VISIT (OUTPATIENT)
Dept: WOUND CARE | Facility: HOSPITAL | Age: 88
End: 2023-01-20

## 2023-01-20 VITALS
HEART RATE: 68 BPM | TEMPERATURE: 97.7 F | SYSTOLIC BLOOD PRESSURE: 127 MMHG | RESPIRATION RATE: 12 BRPM | DIASTOLIC BLOOD PRESSURE: 73 MMHG

## 2023-01-20 DIAGNOSIS — L97.919 CHRONIC VENOUS HYPERTENSION (IDIOPATHIC) WITH ULCER OF RIGHT LOWER EXTREMITY (HCC): Primary | ICD-10-CM

## 2023-01-20 DIAGNOSIS — I87.311 CHRONIC VENOUS HYPERTENSION (IDIOPATHIC) WITH ULCER OF RIGHT LOWER EXTREMITY (HCC): Primary | ICD-10-CM

## 2023-01-20 RX ORDER — LIDOCAINE 40 MG/G
CREAM TOPICAL ONCE
Status: COMPLETED | OUTPATIENT
Start: 2023-01-20 | End: 2023-01-20

## 2023-01-20 RX ADMIN — LIDOCAINE 1 APPLICATION: 40 CREAM TOPICAL at 15:35

## 2023-01-20 NOTE — PROGRESS NOTES
Patient ID: Jo Russo is a 81 y o  male Date of Birth 1935     Chief Complaint  Chief Complaint   Patient presents with   • Follow Up Wound Care Visit     RLE wound       Allergies  Tramadol    Assessment:    No problem-specific Assessment & Plan notes found for this encounter  Diagnoses and all orders for this visit:    Chronic venous hypertension (idiopathic) with ulcer of right lower extremity (HCC)  -     lidocaine (LMX) 4 % cream  -     Wound cleansing and dressings; Future  -     Wound compression and edema control; Future  -     Debridement              Debridement   Wound 01/13/23 Ankle Right;Medial    Universal Protocol:  Consent: Verbal consent obtained  Consent given by: patient  Time out: Immediately prior to procedure a "time out" was called to verify the correct patient, procedure, equipment, support staff and site/side marked as required    Timeout called at: 1/20/2023 3:13 PM   Patient understanding: patient states understanding of the procedure being performed  Patient identity confirmed: verbally with patient      Performed by: physician  Debridement type: surgical  Level of debridement: subcutaneous tissue  Pain control: lidocaine 4%  Post-debridement measurements  Length (cm): 2 8  Width (cm): 1 8  Depth (cm): 0 4  Percent debrided: 90%  Surface Area (cm^2): 5 04  Area debrided (cm^2): 4 54  Volume (cm^3): 2 02  Tissue and other material debrided: subcutaneous tissue  Devitalized tissue debrided: exudate and slough  Instrument(s) utilized: curette  Bleeding: small  Hemostasis obtained with: pressure  Procedural pain (0-10): 0  Post-procedural pain: 0   Response to treatment: procedure was tolerated well          Plan:  Wound is improved  Debrided as above  Continue wound management with silver alginate, see wound orders below  Continue compression with Tubigrip  Keep legs elevated whenever seated and avoid prolonged standing  Follow-up in 2 weeks or call sooner with questions or concerns    Wound 01/13/23 Ankle Right;Medial (Active)   Wound Image Images linked 01/20/23 1531   Wound Description Slough; Yellow 01/20/23 1508   Deedee-wound Assessment Excoriated 01/20/23 1508   Wound Length (cm) 2 8 cm 01/20/23 1508   Wound Width (cm) 1 8 cm 01/20/23 1508   Wound Depth (cm) 0 3 cm 01/20/23 1508   Wound Surface Area (cm^2) 5 04 cm^2 01/20/23 1508   Wound Volume (cm^3) 1 512 cm^3 01/20/23 1508   Calculated Wound Volume (cm^3) 1 51 cm^3 01/20/23 1508   Change in Wound Size % 30 73 01/20/23 1508   Drainage Amount Moderate 01/20/23 1508   Drainage Description Serosanguineous 01/20/23 1508   Non-staged Wound Description Full thickness 01/20/23 1508   Wound packed? No 01/20/23 1508   Patient Tolerance Tolerated well 01/20/23 1508   Dressing Status Intact 01/20/23 1508       Wound 12/16/22 Other (comment) Leg Inner;Right (Active)   Date First Assessed/Time First Assessed: 12/16/22 0909   Pre-Existing Wound: No  Primary Wound Type: Other (comment)  Location: Leg  Wound Location Orientation: Inner;Right  Dressing Status: Clean;Dry; Intact       Wound 01/13/23 Ankle Right;Medial (Active)   Date First Assessed/Time First Assessed: 01/13/23 1308   Location: Ankle  Wound Location Orientation: Right;Medial       [REMOVED] Incision 08/17/18 Leg Right (Removed)   Resolved Date: (c) 09/05/22  Date First Assessed/Time First Assessed: 08/17/18 1127   Location: Leg  Wound Location Orientation: Right       [REMOVED] Wound 09/05/22 Surgical Abdomen Right (Removed)   Resolved Date: 10/25/22  Date First Assessed/Time First Assessed: 09/05/22 1505   Pre-Existing Wound: Yes  Primary Wound Type: Surgical  Location: Abdomen  Wound Location Orientation: Right  Wound Description (Comments): IR Drain tube       [REMOVED] Wound 10/31/22 Surgical Abdomen Right;Quadrant; Upper (Removed)   Resolved Date: 11/30/22  Date First Assessed/Time First Assessed: 10/31/22 1422   Pre-Existing Wound: Yes  Primary Wound Type: Surgical  Location: Abdomen  Wound Location Orientation: Right;Quadrant; Upper  Wound Description (Comments): IR Gail tube p  [REMOVED] Wound 11/09/22 Venous Ulcer Ankle Right;Medial (Removed)   Resolved Date: 11/30/22  Date First Assessed/Time First Assessed: 11/09/22 2200   Pre-Existing Wound: No  Primary Wound Type: Venous Ulcer  Location: Ankle  Wound Location Orientation: Right;Medial  Dressing Status: Clean;Dry; Intact  Wound Outcome: He    [REMOVED] Wound 11/11/22 Pressure Injury Buttocks Right; Inner (Removed)   Resolved Date: 11/30/22  Date First Assessed/Time First Assessed: 11/11/22 1200   Pre-Existing Wound: No  Primary Wound Type: Pressure Injury  Location: Buttocks  Wound Location Orientation: Right; Inner  Wound Description (Comments): stage 2 pressure     Subjective:        Patient presents for follow-up of venous ulcer of the right ankle  No increased pain or drainage  Has been using silver alginate on the wound and Tubigrip for compression  The following portions of the patient's history were reviewed and updated as appropriate:   He  has a past medical history of Anemia, Arthritis, Atrial fibrillation ([facility] [address] ), Basal cell carcinoma (03/22/2022), CHF (congestive heart failure) ([facility] [address] ), Diabetes mellitus ([facility] [address] ), DVT (deep vein thrombosis) in pregnancy (1966), DVT (deep venous thrombosis) ([facility] [address] ) (1966), Dyslipidemia, Encephalopathy acute (11/4/2022), GERD (gastroesophageal reflux disease), Hyperlipidemia, Hypertension, Hypomagnesemia (10/19/2022), Irregular heart beat, Morbid obesity due to excess calories ([address] ), Pulmonary embolism ([facility] [address] ), Sepsis ([facility] [address] ), Squamous cell skin cancer (07/30/2020), and Visual impairment    He   Patient Active Problem List    Diagnosis Date Noted   • Cholecystostomy care [facility]) 11/21/2022   • Physical deconditioning 11/21/2022   • Gout 11/21/2022   • Insomnia due to medical condition 11/21/2022   • Leg pain 11/07/2022   • Acute on chronic cholecystitis 11/04/2022   • Acute cholecystitis 08/30/2022   • Benign prostatic hyperplasia with lower urinary tract symptoms 08/09/2022   • OBINNA (obstructive sleep apnea)    • CKD (chronic kidney disease) stage 3, GFR 30-59 ml/min ([place] [address] ) 05/07/2022   • Acute kidney injury ([place] [address] ) 11/18/2021   • Osteoarthritis, knee 11/15/2021   • Status post right knee replacement 11/15/2021   • Mixed hyperlipidemia 03/29/2021   • Swelling of limb 02/04/2021   • Chronic deep vein thrombosis (DVT) ([place] [address] ) 09/10/2020   • Secondary lymphedema 09/24/2018   • Venous ulcer of ankle, right ([place] [address] ) 07/11/2018   • S/P AVR 03/22/2018   • Thrombophlebitis 03/22/2018   • Presence of implantable cardioverter-defibrillator (ICD) 03/22/2018   • Chronic bilateral low back pain without sciatica 04/24/2017   • Compression fracture of L4 lumbar vertebra 04/24/2017   • BPH (benign prostatic hyperplasia) 04/20/2017   • Obesity, unspecified obesity severity, unspecified obesity type 04/04/2017   • Hydrocele 04/04/2017   • Chronic anticoagulation 04/04/2017   • Anemia 04/04/2017   • Renal cyst 04/04/2017   • Epididymitis 04/02/2017   • Cellulitis of scrotum 04/02/2017   • Dental abscess 12/26/2016   • 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 08/11/2015   • Ventricular tachycardia 04/15/2014   • Paroxysmal atrial fibrillation ([place] [address] ) 03/19/2014   • Edema 03/19/2014   • Chronic diastolic CHF (congestive heart failure) ([place] [address] ) 03/19/2014   • Endocarditis 03/19/2014   • Type 2 diabetes mellitus with diabetic neuropathy, without long-term current use of insulin ([place] [address] ) 08/19/2013   • Peripheral venous insufficiency 08/19/2013   • Cardiomegaly 08/19/2013   • PVD (peripheral vascular disease) ([place] [address] ) 04/19/2013   • Dyslipidemia 04/19/2013     He  reports that he has never smoked  He has never used smokeless tobacco  He reports that he does not currently use alcohol  He reports that he does not use drugs    Current Outpatient Medications   Medication Sig Dispense Refill   • Acetaminophen (TYLENOL EXTRA STRENGTH PO) Take 500 mg by mouth if needed (for pain as needed)  Indications: pain as needed     • albuterol (PROVENTIL HFA,VENTOLIN HFA) 90 mcg/act inhaler Inhale 2 puffs every 6 (six) hours as needed for wheezing or shortness of breath     • allopurinol (ZYLOPRIM) 100 mg tablet Take 1 tablet (100 mg total) by mouth daily Do not start before November 15, 2022  • atorvastatin (LIPITOR) 40 mg tablet Take 40 mg by mouth daily after dinner Atorvastatin Calcium 40 MG Oral Tablet Take 1 tablet daily  Refills: 0  Active      • B Complex-C (SUPER B COMPLEX PO) Take 1 capsule by mouth daily      • Diclofenac Sodium (VOLTAREN) 1 % Apply 2 g topically 4 (four) times a day     • fluticasone (FLONASE) 50 mcg/act nasal spray 2 sprays into each nostril daily as needed Shake liquid and spray     • gabapentin (NEURONTIN) 100 mg capsule Take 100 mg by mouth daily     • Iron Combinations (NIFEREX) TABS Take 1 tablet by mouth in the morning  5   • metFORMIN (GLUCOPHAGE) 1000 MG tablet Take 1,000 mg by mouth daily with dinner  Indications: Type 2 Diabetes     • mirtazapine (REMERON) 7 5 MG tablet Take 15 mg by mouth daily at bedtime ordered by Dr Claudine Esteban     • Multiple Vitamin (MULTIVITAMINS PO) Take 1 tablet by mouth daily     • omeprazole (PriLOSEC) 20 mg delayed release capsule Omeprazole 20 MG Oral Tablet Delayed Release Take 1 tablet daily  Refills: 0  Active     • polyethylene glycol (GLYCOLAX) 17 GM/SCOOP powder Take 17 g by mouth 2 (two) times a day     • sodium chloride, PF, 0 9 % 5 mL by Intracatheter route daily Intracatheter flushing daily 150 mL 1   • sodium chloride, PF, 0 9 % 10 mL by Intracatheter route daily Intracatheter flushing daily  May substitute prefilled syringe with normal saline 10 mL vials, 10 mL syringes, and 18 g blunt needles (Patient taking differently: 10 mL by Intracatheter route every 12 (twelve) hours  Intracatheter flushing daily   May substitute prefilled syringe with normal saline 10 mL vials, 10 mL syringes, and 18 g blunt needles  Indications: FLUSHING) 300 mL 2   • sotalol (BETAPACE) 80 mg tablet Take 1 tablet (80 mg total) by mouth daily  0   • torsemide (DEMADEX) 20 mg tablet Take 20 mg by mouth daily and 10 mg in PM     • warfarin (COUMADIN) 2 mg tablet Take 2 tablets (4 mg total) by mouth daily Acknowledge 14 tablet 0     No current facility-administered medications for this visit  He is allergic to tramadol       Review of Systems   Constitutional: Negative for chills and fever  HENT: Negative for congestion and sneezing  Respiratory: Negative for cough  Cardiovascular: Positive for leg swelling  Skin: Positive for wound  Psychiatric/Behavioral: Negative for agitation  Objective:       Wound 01/13/23 Ankle Right;Medial (Active)   Wound Image Images linked 01/20/23 1531   Wound Description Slough; Yellow 01/20/23 1508   Deedee-wound Assessment Excoriated 01/20/23 1508   Wound Length (cm) 2 8 cm 01/20/23 1508   Wound Width (cm) 1 8 cm 01/20/23 1508   Wound Depth (cm) 0 3 cm 01/20/23 1508   Wound Surface Area (cm^2) 5 04 cm^2 01/20/23 1508   Wound Volume (cm^3) 1 512 cm^3 01/20/23 1508   Calculated Wound Volume (cm^3) 1 51 cm^3 01/20/23 1508   Change in Wound Size % 30 73 01/20/23 1508   Drainage Amount Moderate 01/20/23 1508   Drainage Description Serosanguineous 01/20/23 1508   Non-staged Wound Description Full thickness 01/20/23 1508   Wound packed? No 01/20/23 1508   Patient Tolerance Tolerated well 01/20/23 1508   Dressing Status Intact 01/20/23 1508       /73   Pulse 68   Temp 97 7 °F (36 5 °C)   Resp 12     Physical Exam        Wound 12/16/22 Other (comment) Leg Inner;Right (Active)       Wound 01/13/23 Ankle Right;Medial (Active)   Wound Image   01/20/23 1531   Wound Description Slough; Yellow 01/20/23 1508   Deedee-wound Assessment Excoriated 01/20/23 1508   Wound Length (cm) 2 8 cm 01/20/23 1508   Wound Width (cm) 1 8 cm 01/20/23 1507 Wound Depth (cm) 0 3 cm 01/20/23 1508   Wound Surface Area (cm^2) 5 04 cm^2 01/20/23 1508   Wound Volume (cm^3) 1 512 cm^3 01/20/23 1508   Calculated Wound Volume (cm^3) 1 51 cm^3 01/20/23 1508   Change in Wound Size % 30 73 01/20/23 1508   Drainage Amount Moderate 01/20/23 1508   Drainage Description Serosanguineous 01/20/23 1508   Non-staged Wound Description Full thickness 01/20/23 1508   Wound packed? No 01/20/23 1508   Patient Tolerance Tolerated well 01/20/23 1508   Dressing Status Intact 01/20/23 1508                       Wound Instructions:  Orders Placed This Encounter   Procedures   • Wound cleansing and dressings     Wash your hands with soap and water  Remove old dressing, discard into plastic bag and place in trash  Cleanse the wound with NSS prior to applying a clean dressing  Do not use tissue or cotton balls  Do not scrub the wound  Pat dry using gauze      Shower yes      Apply Moisturizing cream to skin surrounding wound  Apply Silver alginate to the wound  Cover with guaze   Secure with juanjo and tape     Change dressing every other day times a week     Treatment was done as above  Standing Status:   Future     Standing Expiration Date:   1/20/2024   • Wound compression and edema control     Compression Stocking   spandagrip size G:     Remove compression stockings every night HS and re-apply first thing qAM  Follow daily skin care as instructed      Avoid prolonged standing in one place      Use compression pump at home      Elevate leg(s) above the level of the heart when sitting or as much as possible  Standing Status:   Future     Standing Expiration Date:   1/20/2024   • Debridement     This order was created via procedure documentation        Diagnosis ICD-10-CM Associated Orders   1   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

## 2023-01-20 NOTE — PATIENT INSTRUCTIONS
Orders Placed This Encounter   Procedures    Wound cleansing and dressings     Wash your hands with soap and water  Remove old dressing, discard into plastic bag and place in trash  Cleanse the wound with NSS prior to applying a clean dressing  Do not use tissue or cotton balls  Do not scrub the wound  Pat dry using gauze  Shower yes      Apply Moisturizing cream to skin surrounding wound  Apply Silver alginate to the wound  Cover with guaze   Secure with juanjo and tape     Change dressing every other day times a week     Treatment was done as above  Standing Status:   Future     Standing Expiration Date:   1/20/2024    Wound compression and edema control     Compression Stocking   spandagrip size G:     Remove compression stockings every night HS and re-apply first thing qAM  Follow daily skin care as instructed  Avoid prolonged standing in one place  Use compression pump at home  Elevate leg(s) above the level of the heart when sitting or as much as possible       Standing Status:   Future     Standing Expiration Date:   1/20/2024

## 2023-01-23 ENCOUNTER — OFFICE VISIT (OUTPATIENT)
Dept: SURGERY | Facility: CLINIC | Age: 88
End: 2023-01-23

## 2023-01-23 VITALS
WEIGHT: 307 LBS | HEIGHT: 73 IN | BODY MASS INDEX: 40.69 KG/M2 | HEART RATE: 95 BPM | DIASTOLIC BLOOD PRESSURE: 80 MMHG | SYSTOLIC BLOOD PRESSURE: 152 MMHG

## 2023-01-23 DIAGNOSIS — K81.0 ACUTE CHOLECYSTITIS: Primary | ICD-10-CM

## 2023-01-23 NOTE — PROGRESS NOTES
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  The tube cannot be clamped as it becomes clogged  Returns for follow-up visit  He denies upper abdominal pain  He has no energy  Denies fevers or chills  He has occasional coughing associated with meals which is new  Examination reveals the biliary tube in good position  Abdomen is soft  Obese  There was hope that the stones could be removed percutaneously with interventional radiology  There is discussion within the group as to whether this should be pursued given the biliary fistula  Personally, I do not see a contraindication  I explained that he is very high risk for surgery  Although his mind is excellent, he is 81years old with significant morbidities  He is 307 pounds and not very active  Given the development of the fistula, operative repair would need to be open cholecystectomy and repair of colon  Interventional radiology plans on discussing and deciding on further course  All questions answered  There are no diagnoses linked to this encounter  Subjective:      Patient ID: Hailey Lockhart is a 81 y o  male  Patient presents for gallbladder follow up  He has a cholecystostomy tube  His last IR tube check was 1/6/2023  Denies pain          The following portions of the patient's history were reviewed and updated as appropriate:     He  has a past medical history of Anemia, Arthritis, Atrial fibrillation ([facility] [address] ), Basal cell carcinoma (03/22/2022), [facility] Diabetes mellitus ([facility] [address] ), DVT (deep vein thrombosis) in pregnancy (1966), DVT (deep venous thrombosis) ([facility] [address] ) (1966), Dyslipidemia, Encephalopathy acute (11/4/2022), GERD (gastroesophageal reflux disease), Hyperlipidemia, Hypertension, Hypomagnesemia (10/19/2022), Irregular heart beat, Morbid obesity due to excess calories ([facility] [address] ), Pulmonary embolism ([facility] [address] ), Sepsis ([facility] [address] ), Squamous cell skin cancer (07/30/2020), and Visual impairment  He  has a past surgical history that includes Vascular surgery; Aortic valve replacement; Total knee arthroplasty (Left); Cardiac surgery (02/2014); Insert / replace / remove pacemaker; Cardiac defibrillator placement (04/2014); Joint replacement (Left); [place] tooth extraction; Colonoscopy; pr ligj divj&strip long saph saphfem junct kne/belw (Right, 8/17/2018); Vena cava filter placement; Replacement total knee (Right); Mohs surgery (07/30/2020); Mohs surgery (04/18/2022); IR cholecystostomy tube placement (9/1/2022); IR cholecystostomy tube check/change/reposition/reinsertion/upsize (10/13/2022); IR cholecystostomy tube check/change/reposition/reinsertion/upsize (10/19/2022); IR cholecystostomy tube check/change/reposition/reinsertion/upsize (10/27/2022); IR cholecystostomy tube check/change/reposition/reinsertion/upsize (11/7/2022); IR cholecystostomy tube check/change/reposition/reinsertion/upsize (11/10/2022); IR cholecystostomy tube check/change/reposition/reinsertion/upsize (12/1/2022); and IR cholecystostomy tube check/change/reposition/reinsertion/upsize (1/6/2023)  His family history includes Arthritis in his daughter, father, and mother; Cancer in his father; Stroke in his mother  He  reports that he has never smoked  He has never used smokeless tobacco  He reports that he does not currently use alcohol  He reports that he does not use drugs  Current Outpatient Medications   Medication Sig Dispense Refill   • Acetaminophen (TYLENOL EXTRA STRENGTH PO) Take 500 mg by mouth if needed (for pain as needed)   Indications: pain as needed     • albuterol (PROVENTIL HFA,VENTOLIN HFA) 90 mcg/act inhaler Inhale 2 puffs every 6 (six) hours as needed for wheezing or shortness of breath     • allopurinol (ZYLOPRIM) 100 mg tablet Take 1 tablet (100 mg total) by mouth daily Do not start before November 15, 2022  • atorvastatin (LIPITOR) 40 mg tablet Take 40 mg by mouth daily after dinner Atorvastatin Calcium 40 MG Oral Tablet Take 1 tablet daily  Refills: 0  Active      • B Complex-C (SUPER B COMPLEX PO) Take 1 capsule by mouth daily      • Diclofenac Sodium (VOLTAREN) 1 % Apply 2 g topically 4 (four) times a day     • fluticasone (FLONASE) 50 mcg/act nasal spray 2 sprays into each nostril daily as needed Shake liquid and spray     • gabapentin (NEURONTIN) 100 mg capsule Take 100 mg by mouth daily     • Iron Combinations (NIFEREX) TABS Take 1 tablet by mouth in the morning  5   • metFORMIN (GLUCOPHAGE) 1000 MG tablet Take 1,000 mg by mouth daily with dinner  Indications: Type 2 Diabetes     • mirtazapine (REMERON) 7 5 MG tablet Take 15 mg by mouth daily at bedtime ordered by Dr Chepe Garner     • Multiple Vitamin (MULTIVITAMINS PO) Take 1 tablet by mouth daily     • omeprazole (PriLOSEC) 20 mg delayed release capsule Omeprazole 20 MG Oral Tablet Delayed Release Take 1 tablet daily  Refills: 0  Active     • polyethylene glycol (GLYCOLAX) 17 GM/SCOOP powder Take 17 g by mouth 2 (two) times a day     • sodium chloride, PF, 0 9 % 5 mL by Intracatheter route daily Intracatheter flushing daily 150 mL 1   • sodium chloride, PF, 0 9 % 10 mL by Intracatheter route daily Intracatheter flushing daily  May substitute prefilled syringe with normal saline 10 mL vials, 10 mL syringes, and 18 g blunt needles (Patient taking differently: 10 mL by Intracatheter route every 12 (twelve) hours  Intracatheter flushing daily   May substitute prefilled syringe with normal saline 10 mL vials, 10 mL syringes, and 18 g blunt needles  Indications: FLUSHING) 300 mL 2   • sotalol (BETAPACE) 80 mg tablet Take 1 tablet (80 mg total) by mouth daily  0   • torsemide (DEMADEX) 20 mg tablet Take 20 mg by mouth daily and 10 mg in PM     • warfarin (COUMADIN) 2 mg tablet Take 2 tablets (4 mg total) by mouth daily Acknowledge 14 tablet 0     No current facility-administered medications for this visit  He is allergic to tramadol       Review of Systems   Constitutional: Positive for fatigue  Negative for activity change  HENT: Negative  Eyes: Negative  Respiratory: Negative  Cardiovascular: Negative  Gastrointestinal: Negative  Endocrine: Negative  Genitourinary: Negative  Musculoskeletal: Positive for gait problem and myalgias  Skin: Negative  Allergic/Immunologic: Negative  Psychiatric/Behavioral: Negative for agitation, behavioral problems and confusion  The patient is not nervous/anxious  Objective:      /80   Pulse 95   Ht 6' 1" (1 854 m)   Wt (!) 139 kg (307 lb)   BMI 40 50 kg/m²          Physical Exam  Constitutional:       Appearance: He is well-developed  He is not diaphoretic  HENT:      Head: Normocephalic and atraumatic  Eyes:      General: No scleral icterus  Right eye: No discharge  Left eye: No discharge  Extraocular Movements: Extraocular movements intact  Conjunctiva/sclera: Conjunctivae normal    Neck:      Thyroid: No thyromegaly  Trachea: No tracheal deviation  Cardiovascular:      Rate and Rhythm: Normal rate and regular rhythm  Heart sounds: Normal heart sounds  No murmur heard  No friction rub  Pulmonary:      Effort: Pulmonary effort is normal  No respiratory distress  Breath sounds: Normal breath sounds  No stridor  No wheezing  Chest:      Chest wall: No tenderness  Abdominal:      General: There is no distension  Palpations: Abdomen is soft  There is no mass  Tenderness: There is no abdominal tenderness  There is no guarding or rebound  Musculoskeletal:         General: No tenderness  Right lower leg: No edema  Left lower leg: No edema  Lymphadenopathy:      Cervical: No cervical adenopathy     Skin:     General: Skin is warm and dry  Findings: No erythema or rash  Neurological:      Mental Status: He is alert and oriented to person, place, and time  Cranial Nerves: No cranial nerve deficit        Coordination: Coordination normal    Psychiatric:         Behavior: Behavior normal          Judgment: Judgment normal

## 2023-01-24 ENCOUNTER — HOSPITAL ENCOUNTER (OUTPATIENT)
Dept: RADIOLOGY | Facility: HOSPITAL | Age: 88
Discharge: HOME | End: 2023-01-24

## 2023-01-24 ENCOUNTER — TELEPHONE (OUTPATIENT)
Dept: INTERVENTIONAL RADIOLOGY/VASCULAR | Facility: CLINIC | Age: 88
End: 2023-01-24

## 2023-01-24 DIAGNOSIS — Z43.4 CHOLECYSTOSTOMY CARE (HCC): ICD-10-CM

## 2023-01-24 NOTE — TELEPHONE ENCOUNTER
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

## 2023-01-24 NOTE — BRIEF OP NOTE (RAD/CATH)
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)    Scheduled Providers: Saint Opoka, MD Responsible Provider:     Anesthesia Type: Not recorded ASA Status: Not recorded          Preoperative diagnosis:   1  Cholecystostomy care [facility])         Postoperative diagnosis: Same  Surgeon: Saint Opoka, MD     Assistant: None  No qualified resident was available  Blood loss: None    Specimens: None     Findings:   1  Gail tube is in stable position within the gallbladder lumen  2  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

## 2023-01-24 NOTE — H&P
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  Patient returns for drain check  Problem List Items Addressed This Visit        Other    Cholecystostomy care [facility])    Relevant Orders    IR cholecystostomy tube check/change/reposition/reinsertion/upsize          Subjective:     Patient ID: Adeola Medina is a 81 y o  male  History of Present Illness  Patient with history of acute cholecystitis s/p cholecystostomy tube placement with most recent tube check showing 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  Patient returns for drain check      Review of Systems      Past Medical History:   Diagnosis Date   • Anemia    • Arthritis    • Atrial fibrillation ([facility] )    • Basal cell carcinoma 03/22/2022    Tip of Nose   • CHF (congestive heart failure) (HCC)    • Diabetes mellitus (HCC)     Niddm   • DVT (deep vein thrombosis) in pregnancy 1966    not in pregnancy   • DVT (deep venous thrombosis) ([facility] [address] ) 1966   • Dyslipidemia    • Encephalopathy acute 11/4/2022   • GERD (gastroesophageal reflux disease)    • Hyperlipidemia    • Hypertension    • Hypomagnesemia 10/19/2022   • Irregular heart beat     Afib   • Morbid obesity due to excess calories ([address] )     Resolved 9/2/2014    • Pulmonary embolism (HCC)    • Sepsis ([facility] )    • Squamous cell skin cancer 07/30/2020    Left posterior scalp   • Visual impairment         Past Surgical History:   Procedure Laterality Date   • AORTIC VALVE REPLACEMENT     • CARDIAC DEFIBRILLATOR PLACEMENT  04/2014   • CARDIAC SURGERY  02/2014    AVR   • COLONOSCOPY     • INSERT / REPLACE / REMOVE PACEMAKER     • IR CHOLECYSTOSTOMY TUBE CHECK/CHANGE/REPOSITION/REINSERTION/UPSIZE  10/13/2022   • IR CHOLECYSTOSTOMY TUBE CHECK/CHANGE/REPOSITION/REINSERTION/UPSIZE  10/19/2022   • IR CHOLECYSTOSTOMY TUBE CHECK/CHANGE/REPOSITION/REINSERTION/UPSIZE  10/27/2022   • IR CHOLECYSTOSTOMY TUBE CHECK/CHANGE/REPOSITION/REINSERTION/UPSIZE  11/7/2022   • IR CHOLECYSTOSTOMY TUBE CHECK/CHANGE/REPOSITION/REINSERTION/UPSIZE  11/10/2022   • IR CHOLECYSTOSTOMY TUBE CHECK/CHANGE/REPOSITION/REINSERTION/UPSIZE  12/1/2022   • IR CHOLECYSTOSTOMY TUBE CHECK/CHANGE/REPOSITION/REINSERTION/UPSIZE  1/6/2023   • IR CHOLECYSTOSTOMY TUBE PLACEMENT  9/1/2022   • JOINT REPLACEMENT Left     LTKR   • MOHS SURGERY  07/30/2020    Left posterior scalp, Dr Dav Joseph   • [address]  04/18/2022    [facility] Tip of Nose- Dr Dav Joseph   • Natalia Sand DIVJ&STRIP LONG SAPH SAPHFEM Mina Boros Right 8/17/2018    Procedure: LEG PERFORATED INJECTION SCLEROTHERAPY;  Surgeon: Magdalena Amanda MD;  Location: [facility];  Service: Vascular   • REPLACEMENT TOTAL KNEE Right    • TOTAL KNEE ARTHROPLASTY Left    • VASCULAR SURGERY     • VENA CAVA FILTER PLACEMENT      Interruption inferior vena cava, Josue filter, placement   • WISDOM TOOTH EXTRACTION          Social History     Tobacco Use   Smoking Status Never   Smokeless Tobacco Never        Social History     Substance and Sexual Activity   Alcohol Use Not Currently    Comment: no alcohol in 28 yrs        Social History     Substance and Sexual Activity   Drug Use Never        Allergies   Allergen Reactions   • Tramadol Other (See Comments)     intolerance       Current Outpatient Medications   Medication Sig Dispense Refill   • Acetaminophen (TYLENOL EXTRA STRENGTH PO) Take 500 mg by mouth if needed (for pain as needed)  Indications: pain as needed     • albuterol (PROVENTIL HFA,VENTOLIN HFA) 90 mcg/act inhaler Inhale 2 puffs every 6 (six) hours as needed for wheezing or shortness of breath     • allopurinol (ZYLOPRIM) 100 mg tablet Take 1 tablet (100 mg total) by mouth daily Do not start before November 15, 2022       • atorvastatin (LIPITOR) 40 mg tablet Take 40 mg by mouth daily after dinner Atorvastatin Calcium 40 MG Oral Tablet Take 1 tablet daily  Refills: 0  Active      • B Complex-C (SUPER B COMPLEX PO) Take 1 capsule by mouth daily      • Diclofenac Sodium (VOLTAREN) 1 % Apply 2 g topically 4 (four) times a day     • fluticasone (FLONASE) 50 mcg/act nasal spray 2 sprays into each nostril daily as needed Shake liquid and spray     • gabapentin (NEURONTIN) 100 mg capsule Take 100 mg by mouth daily     • Iron Combinations (NIFEREX) TABS Take 1 tablet by mouth in the morning  5   • metFORMIN (GLUCOPHAGE) 1000 MG tablet Take 1,000 mg by mouth daily with dinner  Indications: Type 2 Diabetes     • mirtazapine (REMERON) 7 5 MG tablet Take 15 mg by mouth daily at bedtime ordered by Dr Domingo Ron     • Multiple Vitamin (MULTIVITAMINS PO) Take 1 tablet by mouth daily     • omeprazole (PriLOSEC) 20 mg delayed release capsule Omeprazole 20 MG Oral Tablet Delayed Release Take 1 tablet daily  Refills: 0  Active     • polyethylene glycol (GLYCOLAX) 17 GM/SCOOP powder Take 17 g by mouth 2 (two) times a day     • sodium chloride, PF, 0 9 % 5 mL by Intracatheter route daily Intracatheter flushing daily 150 mL 1   • sodium chloride, PF, 0 9 % 10 mL by Intracatheter route daily Intracatheter flushing daily  May substitute prefilled syringe with normal saline 10 mL vials, 10 mL syringes, and 18 g blunt needles (Patient taking differently: 10 mL by Intracatheter route every 12 (twelve) hours  Intracatheter flushing daily  May substitute prefilled syringe with normal saline 10 mL vials, 10 mL syringes, and 18 g blunt needles  Indications: FLUSHING) 300 mL 2   • sotalol (BETAPACE) 80 mg tablet Take 1 tablet (80 mg total) by mouth daily  0   • torsemide (DEMADEX) 20 mg tablet Take 20 mg by mouth daily and 10 mg in PM     • warfarin (COUMADIN) 2 mg tablet Take 2 tablets (4 mg total) by mouth daily Acknowledge 14 tablet 0     No current facility-administered medications for this encounter  Objective: There were no vitals filed for this visit  Physical Exam  Constitutional:       Appearance: Normal appearance  Pulmonary:      Effort: Pulmonary effort is normal    Abdominal:      Comments: Gail tube in place           Lab Results   Component Value Date     (H) 11/04/2022      Lab Results   Component Value Date    WBC 8 05 11/14/2022    HGB 9 1 (L) 11/14/2022    HCT 30 7 (L) 11/14/2022    MCV 91 11/14/2022     11/14/2022     Lab Results   Component Value Date    INR 2 21 (H) 11/14/2022    INR 2 01 (H) 11/13/2022    INR 1 56 (H) 11/12/2022    PROTIME 24 8 (H) 11/14/2022    PROTIME 23 0 (H) 11/13/2022    PROTIME 18 9 (H) 11/12/2022     Lab Results   Component Value Date    PTT 87 (H) 11/13/2022         I have personally reviewed pertinent imaging and laboratory results  Code Status: Prior  Advance Directive and Living Will:      Power of :    POLST:      This text is generated with voice recognition software  There may be translation, syntax,  or grammatical errors  If you have any questions, please contact the dictating provider

## 2023-01-24 NOTE — DISCHARGE INSTRUCTIONS
TUBE CARE INSTRUCTIONS    Care after your procedure:    Resume your normal diet  Small sips of flat soda will help with nausea  1  The properly functioning catheter should be forward flushed once (1x) daily with 5ml of normal saline using clean technique  You will be given a prescription for flushes  To flush the tube, clean both connections with alcohol swab  Twist off the drainage bag/ bulb  tubing and twist the saline syringe into the drainage tube and flush  Remove the syringe and twist the drainage bag / bulb tubing tubing back on     2  The drainage bag/bulb may be emptied as necessary  Keep a record of the amount of fluid you drain from your tube  This should be done with clean technique as well  3  A fresh dressing should be applied daily over the tube insertion site  4  As the tube is secured to the skin with only a suture,try not to pull on your tube  Tub baths are not permitted  Showers are permitted if the patient's skin entry site is prevented from getting wet  Similarly, washcloth "baths" are acceptable  Contact Interventional Radiology at [phone] Dennys PATIENTS: Contact Interventional Radiology at [phone]) Rita Kaiser PATIENTS: Contact Interventional Radiology at [phone]) if:    1  Leakage or large amounts of liquid around the catheter  2  Fever of 101 degrees lasting several hours without other obvious cause (such as sore throat, flu, etc)  3  Persistent nausea or vomiting  4  Diminished drainage, which may be associated with pressure or pain  Or when the     drainage from your tube is less than 10mls for 48 hours  5  Catheter pulled back or falls out  The following pharmacies carry the flush syringes         [facility]                     [facility]  [address] [facility]                         [address] [facility]  Phone [phone]            Phone [phone]   [place] [address]                                [phone]  [address] [facility] [place] Pritesh Less [place]                      [address]  Phone [phone]            Phone [phone]                      Feroz Alexander                                                                                                          [phone]  [facility]  [facility] [address]    [address]  Phone [phone]

## 2023-01-25 ENCOUNTER — TELEPHONE (OUTPATIENT)
Dept: INTERVENTIONAL RADIOLOGY/VASCULAR | Facility: CLINIC | Age: 88
End: 2023-01-25

## 2023-01-25 NOTE — TELEPHONE ENCOUNTER
IR Clinic Consult:    Patient clinical visit in 1 week      Schedule visit for the first available IR Physician: Celine                *If no, schedule for:   IR Physician: Francie    Type of Visit: Virtual Regular Visit - Video    Additional Details: Cholangioscopy

## 2023-02-03 ENCOUNTER — OFFICE VISIT (OUTPATIENT)
Dept: WOUND CARE | Facility: HOSPITAL | Age: 88
End: 2023-02-03

## 2023-02-03 VITALS
RESPIRATION RATE: 20 BRPM | HEART RATE: 82 BPM | SYSTOLIC BLOOD PRESSURE: 162 MMHG | DIASTOLIC BLOOD PRESSURE: 76 MMHG | TEMPERATURE: 96.2 F

## 2023-02-03 DIAGNOSIS — E11.40 TYPE 2 DIABETES MELLITUS WITH DIABETIC NEUROPATHY, WITHOUT LONG-TERM CURRENT USE OF INSULIN (HCC): ICD-10-CM

## 2023-02-03 DIAGNOSIS — L97.312 NON-PRESSURE CHRONIC ULCER OF RIGHT ANKLE WITH FAT LAYER EXPOSED (HCC): ICD-10-CM

## 2023-02-03 DIAGNOSIS — I87.311 CHRONIC VENOUS HYPERTENSION (IDIOPATHIC) WITH ULCER OF RIGHT LOWER EXTREMITY (HCC): Primary | ICD-10-CM

## 2023-02-03 DIAGNOSIS — L97.919 CHRONIC VENOUS HYPERTENSION (IDIOPATHIC) WITH ULCER OF RIGHT LOWER EXTREMITY (HCC): Primary | ICD-10-CM

## 2023-02-03 RX ORDER — LIDOCAINE 40 MG/G
CREAM TOPICAL ONCE
Status: COMPLETED | OUTPATIENT
Start: 2023-02-03 | End: 2023-02-03

## 2023-02-03 RX ADMIN — LIDOCAINE: 40 CREAM TOPICAL at 15:18

## 2023-02-03 NOTE — PATIENT INSTRUCTIONS
Orders Placed This Encounter   Procedures    Wound cleansing and dressings     Right ankle wound:    Wash your hands with soap and water  Remove old dressing, discard into plastic bag and place in trash  Cleanse the wound with NSS prior to applying a clean dressing  Do not use tissue or cotton balls  Do not scrub the wound  Pat dry using gauze  Apply Moisturizing cream to intact skin   Apply Silver alginate to the wound  Cover with gauze   Secure with juanjo and tape     Change dressing every other day as needed for excessive drainage    Above treatment performed at wound care center             Standing Status:   Future     Standing Expiration Date:   2/3/2024    Wound compression and edema control     Elastic Tubular Stocking    Size G    Tubular elastic bandage: Apply from base of toes to behind the knee  Apply in AM, may remove for sleep  Avoid prolonged standing in one place  Elevate leg(s) above the level of the heart when sitting or as much as possible         Continue use of compression pumps at home     Standing Status:   Future     Standing Expiration Date:   2/3/2024

## 2023-02-03 NOTE — PROGRESS NOTES
Patient ID: Keily Ng is a 81 y o  male Date of Birth 1935     Chief Complaint  Chief Complaint   Patient presents with   • Follow Up Wound Care Visit     Right lower leg       Allergies  Tramadol    Assessment:     Diagnoses and all orders for this visit:    Chronic venous hypertension (idiopathic) with ulcer of right lower extremity ([address] )  -     Wound cleansing and dressings; Future  -     lidocaine (LMX) 4 % cream  -     Wound compression and edema control; Future    Non-pressure chronic ulcer of right ankle with fat layer exposed ([facility] )    Type 2 diabetes mellitus with diabetic neuropathy, without long-term current use of insulin ([facility] [address] )    Other orders  -     Debridement              Debridement   Wound 01/13/23 Ankle Right;Medial    Universal Protocol:  Consent: Written consent obtained  Consent given by: patient  Time out: Immediately prior to procedure a "time out" was called to verify the correct patient, procedure, equipment, support staff and site/side marked as required  Timeout called at: 2/3/2023 3:52 PM   Patient identity confirmed: verbally with patient      Performed by: NP  Debridement type: selective  Pain control: lidocaine 4%  Pre-debridement measurements  Length (cm): 2 5  Width (cm): 1 9  Depth (cm): 0 3  Surface Area (cm^2): 4 75  Volume (cm^3): 1 43    Post-debridement measurements  Length (cm): 2 5  Width (cm): 1 9  Depth (cm): 0 3  Percent debrided: 100%  Surface Area (cm^2): 4 75  Area debrided (cm^2): 4 75  Volume (cm^3): 1 43  Devitalized tissue debrided: biofilm, fibrin and slough  Instrument(s) utilized: curette  Bleeding: small  Hemostasis obtained with: pressure  Procedural pain (0-10): 0  Post-procedural pain: 0   Response to treatment: procedure was tolerated well          Plan:  1  F/u visit  Wound debrided  Wound improving and measuring smaller  Continue current plan of care    Patient will follow-up in 2 weeks     Wound 01/13/23 Ankle Right;Medial (Active)   Wound Image Images linked 02/03/23 1510   Wound Description Yellow;Pink;Slough; Epithelialization 02/03/23 1515   Deedee-wound Assessment Edema; Maceration;Pink 02/03/23 1515   Wound Length (cm) 2 5 cm 02/03/23 1515   Wound Width (cm) 1 9 cm 02/03/23 1515   Wound Depth (cm) 0 3 cm 02/03/23 1515   Wound Surface Area (cm^2) 4 75 cm^2 02/03/23 1515   Wound Volume (cm^3) 1 425 cm^3 02/03/23 1515   Calculated Wound Volume (cm^3) 1 43 cm^3 02/03/23 1515   Change in Wound Size % 34 4 02/03/23 1515   Drainage Amount Moderate 02/03/23 1515   Drainage Description Serosanguineous;Tan;Green 02/03/23 1515   Non-staged Wound Description Full thickness 02/03/23 1515   Dressing Status Intact 02/03/23 1515       Wound 12/16/22 Other (comment) Leg Inner;Right (Active)   Date First Assessed/Time First Assessed: 12/16/22 0909   Pre-Existing Wound: No  Primary Wound Type: Other (comment)  Location: Leg  Wound Location Orientation: Inner;Right  Dressing Status: Clean;Dry; Intact       Wound 01/13/23 Ankle Right;Medial (Active)   Date First Assessed/Time First Assessed: 01/13/23 1308   Location: Ankle  Wound Location Orientation: Right;Medial       [REMOVED] Incision 08/17/18 Leg Right (Removed)   Resolved Date: (c) 09/05/22  Date First Assessed/Time First Assessed: 08/17/18 1127   Location: Leg  Wound Location Orientation: Right       [REMOVED] Wound 09/05/22 Surgical Abdomen Right (Removed)   Resolved Date: 10/25/22  Date First Assessed/Time First Assessed: 09/05/22 1505   Pre-Existing Wound: Yes  Primary Wound Type: Surgical  Location: Abdomen  Wound Location Orientation: Right  Wound Description (Comments): IR Drain tube       [REMOVED] Wound 10/31/22 Surgical Abdomen Right;Quadrant; Upper (Removed)   Resolved Date: 11/30/22  Date First Assessed/Time First Assessed: 10/31/22 1422   Pre-Existing Wound: Yes  Primary Wound Type: Surgical  Location: Abdomen  Wound Location Orientation: Right;Quadrant; Upper  Wound Description (Comments): IR Gail tube p        [REMOVED] Wound 11/09/22 Venous Ulcer Ankle Right;Medial (Removed)   Resolved Date: 11/30/22  Date First Assessed/Time First Assessed: 11/09/22 2200   Pre-Existing Wound: No  Primary Wound Type: Venous Ulcer  Location: Ankle  Wound Location Orientation: Right;Medial  Dressing Status: Clean;Dry; Intact  Wound Outcome: He    [REMOVED] Wound 11/11/22 Pressure Injury Buttocks Right; Inner (Removed)   Resolved Date: 11/30/22  Date First Assessed/Time First Assessed: 11/11/22 1200   Pre-Existing Wound: No  Primary Wound Type: Pressure Injury  Location: Buttocks  Wound Location Orientation: Right; Inner  Wound Description (Comments): stage 2 pressure     Subjective:     F/U visit for venous ulcer of right ankle  No new complaints  He denies any pain, fevers, or chills  The following portions of the patient's history were reviewed and updated as appropriate:   He  has a past medical history of Anemia, Arthritis, Atrial fibrillation ([facility] ), Basal cell carcinoma (03/22/2022), CHF (congestive heart failure) ([facility] [address] ), Diabetes mellitus ([facility] [address] ), DVT (deep vein thrombosis) in pregnancy (1966), DVT (deep venous thrombosis) ([facility] [address] ) (1966), Dyslipidemia, Encephalopathy acute (11/4/2022), GERD (gastroesophageal reflux disease), Hyperlipidemia, Hypertension, Hypomagnesemia (10/19/2022), Irregular heart beat, Morbid obesity due to excess calories ([address] ), Pulmonary embolism ([facility] [address] ), Sepsis ([facility] [address] ), Squamous cell skin cancer (07/30/2020), and Visual impairment    He   Patient Active Problem List    Diagnosis Date Noted   • Cholecystostomy care [facility]) 11/21/2022   • Physical deconditioning 11/21/2022   • Gout 11/21/2022   • Insomnia due to medical condition 11/21/2022   • Leg pain 11/07/2022   • Acute on chronic cholecystitis 11/04/2022   • Acute cholecystitis 08/30/2022   • Benign prostatic hyperplasia with lower urinary tract symptoms 08/09/2022   • OBINNA (obstructive sleep apnea)    • CKD (chronic kidney disease) stage 3, GFR 30-59 ml/min ([place] [address] ) 05/07/2022   • Acute kidney injury ([place] [address] ) 11/18/2021   • Osteoarthritis, knee 11/15/2021   • Status post right knee replacement 11/15/2021   • Mixed hyperlipidemia 03/29/2021   • Swelling of limb 02/04/2021   • Chronic deep vein thrombosis (DVT) ([place] [address] ) 09/10/2020   • Secondary lymphedema 09/24/2018   • Venous ulcer of ankle, right ([place] [address] ) 07/11/2018   • S/P AVR 03/22/2018   • Thrombophlebitis 03/22/2018   • Presence of implantable cardioverter-defibrillator (ICD) 03/22/2018   • Chronic bilateral low back pain without sciatica 04/24/2017   • Compression fracture of L4 lumbar vertebra 04/24/2017   • BPH (benign prostatic hyperplasia) 04/20/2017   • Obesity, unspecified obesity severity, unspecified obesity type 04/04/2017   • Hydrocele 04/04/2017   • Chronic anticoagulation 04/04/2017   • Anemia 04/04/2017   • Renal cyst 04/04/2017   • Epididymitis 04/02/2017   • Cellulitis of scrotum 04/02/2017   • Dental abscess 12/26/2016   • 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 08/11/2015   • Ventricular tachycardia 04/15/2014   • Paroxysmal atrial fibrillation ([place] [address] ) 03/19/2014   • Edema 03/19/2014   • Chronic diastolic CHF (congestive heart failure) ([place] [address] ) 03/19/2014   • Endocarditis 03/19/2014   • Type 2 diabetes mellitus with diabetic neuropathy, without long-term current use of insulin ([place] [address] ) 08/19/2013   • Peripheral venous insufficiency 08/19/2013   • Cardiomegaly 08/19/2013   • PVD (peripheral vascular disease) ([place] [address] ) 04/19/2013   • Dyslipidemia 04/19/2013     He  has a past surgical history that includes Vascular surgery; Aortic valve replacement; Total knee arthroplasty (Left); Cardiac surgery (02/2014); Insert / replace / remove pacemaker; Cardiac defibrillator placement (04/2014); Joint replacement (Left); [place] tooth extraction; Colonoscopy; pr ligj divj&strip long saph saphfem junct kne/belw (Right, 8/17/2018); Vena cava filter placement; Replacement total knee (Right);  Mohs surgery (07/30/2020); Mohs surgery (04/18/2022); IR cholecystostomy tube placement (9/1/2022); IR cholecystostomy tube check/change/reposition/reinsertion/upsize (10/13/2022); IR cholecystostomy tube check/change/reposition/reinsertion/upsize (10/19/2022); IR cholecystostomy tube check/change/reposition/reinsertion/upsize (10/27/2022); IR cholecystostomy tube check/change/reposition/reinsertion/upsize (11/7/2022); IR cholecystostomy tube check/change/reposition/reinsertion/upsize (11/10/2022); IR cholecystostomy tube check/change/reposition/reinsertion/upsize (12/1/2022); IR cholecystostomy tube check/change/reposition/reinsertion/upsize (1/6/2023); and IR cholecystostomy tube check/change/reposition/reinsertion/upsize (1/24/2023)  His family history includes Arthritis in his daughter, father, and mother; Cancer in his father; Stroke in his mother  He  reports that he has never smoked  He has never used smokeless tobacco  He reports that he does not currently use alcohol  He reports that he does not use drugs  Current Outpatient Medications   Medication Sig Dispense Refill   • Acetaminophen (TYLENOL EXTRA STRENGTH PO) Take 500 mg by mouth if needed (for pain as needed)  Indications: pain as needed     • albuterol (PROVENTIL HFA,VENTOLIN HFA) 90 mcg/act inhaler Inhale 2 puffs every 6 (six) hours as needed for wheezing or shortness of breath     • allopurinol (ZYLOPRIM) 100 mg tablet Take 1 tablet (100 mg total) by mouth daily Do not start before November 15, 2022       • atorvastatin (LIPITOR) 40 mg tablet Take 40 mg by mouth daily after dinner Atorvastatin Calcium 40 MG Oral Tablet Take 1 tablet daily  Refills: 0  Active      • B Complex-C (SUPER B COMPLEX PO) Take 1 capsule by mouth daily      • Diclofenac Sodium (VOLTAREN) 1 % Apply 2 g topically 4 (four) times a day     • fluticasone (FLONASE) 50 mcg/act nasal spray 2 sprays into each nostril daily as needed Shake liquid and spray     • gabapentin (NEURONTIN) 100 mg capsule Take 100 mg by mouth daily     • Iron Combinations (NIFEREX) TABS Take 1 tablet by mouth in the morning  5   • metFORMIN (GLUCOPHAGE) 1000 MG tablet Take 1,000 mg by mouth daily with dinner  Indications: Type 2 Diabetes     • mirtazapine (REMERON) 7 5 MG tablet Take 15 mg by mouth daily at bedtime ordered by Dr Zheng Soto     • Multiple Vitamin (MULTIVITAMINS PO) Take 1 tablet by mouth daily     • omeprazole (PriLOSEC) 20 mg delayed release capsule Omeprazole 20 MG Oral Tablet Delayed Release Take 1 tablet daily  Refills: 0  Active     • polyethylene glycol (GLYCOLAX) 17 GM/SCOOP powder Take 17 g by mouth 2 (two) times a day     • sodium chloride, PF, 0 9 % 5 mL by Intracatheter route daily Intracatheter flushing daily 150 mL 1   • sodium chloride, PF, 0 9 % 10 mL by Intracatheter route daily Intracatheter flushing daily  May substitute prefilled syringe with normal saline 10 mL vials, 10 mL syringes, and 18 g blunt needles (Patient taking differently: 10 mL by Intracatheter route every 12 (twelve) hours  Intracatheter flushing daily  May substitute prefilled syringe with normal saline 10 mL vials, 10 mL syringes, and 18 g blunt needles  Indications: FLUSHING) 300 mL 2   • sotalol (BETAPACE) 80 mg tablet Take 1 tablet (80 mg total) by mouth daily  0   • torsemide (DEMADEX) 20 mg tablet Take 20 mg by mouth daily and 10 mg in PM     • warfarin (COUMADIN) 2 mg tablet Take 2 tablets (4 mg total) by mouth daily Acknowledge 14 tablet 0     No current facility-administered medications for this visit  He is allergic to tramadol       Review of Systems   Constitutional: Negative  HENT: Negative for ear pain and hearing loss  Eyes: Negative for pain  Respiratory: Negative for chest tightness and shortness of breath  Cardiovascular: Positive for leg swelling  Negative for chest pain and palpitations  Gastrointestinal: Negative for diarrhea, nausea and vomiting  Genitourinary: Negative for dysuria  Musculoskeletal: Negative for gait problem  Skin: Positive for wound  Neurological: Negative for tremors and weakness  Psychiatric/Behavioral: Negative for behavioral problems, confusion and suicidal ideas  Objective:       Wound 01/13/23 Ankle Right;Medial (Active)   Wound Image Images linked 02/03/23 1510   Wound Description Yellow;Pink;Slough; Epithelialization 02/03/23 1515   Deedee-wound Assessment Edema; Maceration;Pink 02/03/23 1515   Wound Length (cm) 2 5 cm 02/03/23 1515   Wound Width (cm) 1 9 cm 02/03/23 1515   Wound Depth (cm) 0 3 cm 02/03/23 1515   Wound Surface Area (cm^2) 4 75 cm^2 02/03/23 1515   Wound Volume (cm^3) 1 425 cm^3 02/03/23 1515   Calculated Wound Volume (cm^3) 1 43 cm^3 02/03/23 1515   Change in Wound Size % 34 4 02/03/23 1515   Drainage Amount Moderate 02/03/23 1515   Drainage Description Serosanguineous;Tan;Green 02/03/23 1515   Non-staged Wound Description Full thickness 02/03/23 1515   Dressing Status Intact 02/03/23 1515       /76   Pulse 82   Temp (!) 96 2 °F (35 7 °C)   Resp 20              Wound Instructions:  Orders Placed This Encounter   Procedures   • Wound cleansing and dressings     Right ankle wound:    Wash your hands with soap and water  Remove old dressing, discard into plastic bag and place in trash  Cleanse the wound with NSS prior to applying a clean dressing  Do not use tissue or cotton balls  Do not scrub the wound  Pat dry using gauze      Apply Moisturizing cream to intact skin   Apply Silver alginate to the wound  Cover with gauze   Secure with juanjo and tape     Change dressing every other day as needed for excessive drainage    Above treatment performed at wound care center             Standing Status:   Future     Standing Expiration Date:   2/3/2024   • Wound compression and edema control     Elastic Tubular Stocking    Size G    Tubular elastic bandage: Apply from base of toes to behind the knee   Apply in AM, may remove for sleep  Avoid prolonged standing in one place  Elevate leg(s) above the level of the heart when sitting or as much as possible  Continue use of compression pumps at home     Standing Status:   Future     Standing Expiration Date:   2/3/2024   • Debridement     This order was created via procedure documentation        Diagnosis ICD-10-CM Associated Orders   1  Chronic venous hypertension (idiopathic) with ulcer of right lower extremity ([facility])  I87 311 Wound cleansing and dressings    L97 919 lidocaine (LMX) 4 % cream     Wound compression and edema control      2  Non-pressure chronic ulcer of right ankle with fat layer exposed ([facility] [address] )  L97 312       3   Type 2 diabetes mellitus with diabetic neuropathy, without long-term current use of insulin ([facility])  E11 40

## 2023-02-13 ENCOUNTER — TELEMEDICINE (OUTPATIENT)
Dept: INTERVENTIONAL RADIOLOGY/VASCULAR | Facility: CLINIC | Age: 88
End: 2023-02-13

## 2023-02-13 DIAGNOSIS — K81.2 ACUTE ON CHRONIC CHOLECYSTITIS: Primary | ICD-10-CM

## 2023-02-13 RX ORDER — SODIUM CHLORIDE 9 MG/ML
75 INJECTION, SOLUTION INTRAVENOUS CONTINUOUS
OUTPATIENT
Start: 2023-02-13

## 2023-02-13 NOTE — H&P (VIEW-ONLY)
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  Although he has cardiac clearance, the surgery would be complex given the presence of the fistula and Dr Alisha Gonsales has referred the patient for percutaneous removal of the stones  Will proceed with scheduling the procedure under mac/general anesthesia  He prefers [facility] as he lives closest to that campus  Recent Visits  No visits were found meeting these conditions  Showing recent visits within past 7 days and meeting all other requirements  Today's Visits  Date Type Provider Dept   02/13/23 Telemedicine MD Benja Garcia today's visits and meeting all other requirements  Future Appointments  No visits were found meeting these conditions    Showing future appointments within next 150 days and meeting all other requirements         Visit Time    Visit Start Time: 2:00  Visit Stop Time: 2:18  Total Visit Duration: 18 minutes

## 2023-02-13 NOTE — PROGRESS NOTES
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  Although he has cardiac clearance, the surgery would be complex given the presence of the fistula and Dr Batsheva Florez has referred the patient for percutaneous removal of the stones  Will proceed with scheduling the procedure under mac/general anesthesia  He prefers [facility] as he lives closest to that campus  Recent Visits  No visits were found meeting these conditions  Showing recent visits within past 7 days and meeting all other requirements  Today's Visits  Date Type Provider Dept   02/13/23 Telemedicine MD Benja Haynes today's visits and meeting all other requirements  Future Appointments  No visits were found meeting these conditions    Showing future appointments within next 150 days and meeting all other requirements         Visit Time    Visit Start Time: 2:00  Visit Stop Time: 2:18  Total Visit Duration: 18 minutes

## 2023-02-13 NOTE — ASSESSMENT & PLAN NOTE
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

## 2023-02-17 ENCOUNTER — OFFICE VISIT (OUTPATIENT)
Dept: WOUND CARE | Facility: HOSPITAL | Age: 88
End: 2023-02-17

## 2023-02-17 VITALS
HEART RATE: 76 BPM | RESPIRATION RATE: 20 BRPM | SYSTOLIC BLOOD PRESSURE: 144 MMHG | DIASTOLIC BLOOD PRESSURE: 66 MMHG | TEMPERATURE: 96.2 F

## 2023-02-17 DIAGNOSIS — E11.40 TYPE 2 DIABETES MELLITUS WITH DIABETIC NEUROPATHY, WITHOUT LONG-TERM CURRENT USE OF INSULIN (HCC): ICD-10-CM

## 2023-02-17 DIAGNOSIS — L97.919 CHRONIC VENOUS HYPERTENSION (IDIOPATHIC) WITH ULCER OF RIGHT LOWER EXTREMITY (HCC): Primary | ICD-10-CM

## 2023-02-17 DIAGNOSIS — I87.311 CHRONIC VENOUS HYPERTENSION (IDIOPATHIC) WITH ULCER OF RIGHT LOWER EXTREMITY (HCC): Primary | ICD-10-CM

## 2023-02-17 DIAGNOSIS — L97.312 NON-PRESSURE CHRONIC ULCER OF RIGHT ANKLE WITH FAT LAYER EXPOSED (HCC): ICD-10-CM

## 2023-02-17 RX ORDER — LIDOCAINE 40 MG/G
CREAM TOPICAL ONCE
Status: COMPLETED | OUTPATIENT
Start: 2023-02-17 | End: 2023-02-17

## 2023-02-17 RX ADMIN — LIDOCAINE 1 APPLICATION: 40 CREAM TOPICAL at 14:42

## 2023-02-17 NOTE — PROGRESS NOTES
Patient ID: Natalie Zavala is a 81 y o  male Date of Birth 1935     Chief Complaint  Chief Complaint   Patient presents with   • Follow Up Wound Care Visit     RLE       Allergies  Tramadol    Assessment:    No problem-specific Assessment & Plan notes found for this encounter  Diagnoses and all orders for this visit:    Chronic venous hypertension (idiopathic) with ulcer of right lower extremity (HCC)  -     lidocaine (LMX) 4 % cream  -     Cancel: Wound cleansing and dressings; Future  -     Wound compression and edema control; Future  -     Wound cleansing and dressings; Future  -     Debridement  -     Debridement    Non-pressure chronic ulcer of right ankle with fat layer exposed ([address] )  -     Debridement  -     Debridement    Type 2 diabetes mellitus with diabetic neuropathy, without long-term current use of insulin (HCC)              Debridement   Wound 01/13/23 Ankle Right;Medial    Universal Protocol:  Consent: Verbal consent obtained  Written consent obtained  Risks and benefits: risks, benefits and alternatives were discussed  Consent given by: patient  Time out: Immediately prior to procedure a "time out" was called to verify the correct patient, procedure, equipment, support staff and site/side marked as required    Timeout called at: 2/17/2023 3:11 PM   Patient understanding: patient states understanding of the procedure being performed  Patient identity confirmed: verbally with patient      Performed by: NP  Debridement type: selective  Pain control: lidocaine 4%  Pre-debridement measurements  Length (cm): 2 1  Width (cm): 1 3  Depth (cm): 0 2  Surface Area (cm^2): 2 73  Volume (cm^3): 0 55    Post-debridement measurements  Length (cm): 2 1  Width (cm): 1 3  Depth (cm): 0 2  Percent debrided: 100%  Surface Area (cm^2): 2 73  Area debrided (cm^2): 2 73  Volume (cm^3): 0 55  Devitalized tissue debrided: biofilm, exudate, fibrin and slough  Instrument(s) utilized: curette  Bleeding: small  Hemostasis obtained with: not applicable  Procedural pain (0-10): 0  Post-procedural pain: 0   Response to treatment: procedure was tolerated well    Debridement   Wound 02/17/23 Venous Ulcer Leg Right; Lower;Mid;Medial    Universal Protocol:  Consent: Verbal consent obtained  Written consent obtained  Risks and benefits: risks, benefits and alternatives were discussed  Consent given by: patient  Time out: Immediately prior to procedure a "time out" was called to verify the correct patient, procedure, equipment, support staff and site/side marked as required  Timeout called at: 2/17/2023 3:21 PM   Patient understanding: patient states understanding of the procedure being performed  Patient identity confirmed: verbally with patient      Performed by: NP  Debridement type: selective  Pain control: lidocaine 4%  Pre-debridement measurements  Length (cm): 3 2  Width (cm): 4 5  Depth (cm): 0 1  Surface Area (cm^2): 14 4  Volume (cm^3): 1 44    Post-debridement measurements  Length (cm): 3 2  Width (cm): 4 5  Depth (cm): 0 1  Percent debrided: 100%  Surface Area (cm^2): 14 4  Area debrided (cm^2): 14 4  Volume (cm^3): 1 44  Devitalized tissue debrided: biofilm, exudate, fibrin and slough  Instrument(s) utilized: curette  Bleeding: small  Hemostasis obtained with: not applicable  Procedural pain (0-10): 0  Post-procedural pain: 0   Response to treatment: procedure was tolerated well          Plan:  Follow-up visit for right lower extremity wound  Right ankle wound measuring smaller today  Patient has cluster of new wounds to the right anterior pretibial   Recommend to continue to apply silver alginate to both areas every other day  Follow-up in 2 weeks  Wound 01/13/23 Ankle Right;Medial (Active)   Wound Image Images linked 02/17/23 1431   Wound Description Yellow;Pink;Slough 02/17/23 1443   Deedee-wound Assessment Edema; Maceration;Pink 02/17/23 1443   Wound Length (cm) 2 1 cm 02/17/23 1443   Wound Width (cm) 1 3 cm 02/17/23 1443   Wound Depth (cm) 0 2 cm 02/17/23 1443   Wound Surface Area (cm^2) 2 73 cm^2 02/17/23 1443   Wound Volume (cm^3) 0 546 cm^3 02/17/23 1443   Calculated Wound Volume (cm^3) 0 55 cm^3 02/17/23 1443   Change in Wound Size % 74 77 02/17/23 1443   Drainage Amount Moderate 02/17/23 1443   Drainage Description Serosanguineous;Castanon;Yellow 02/17/23 1443   Non-staged Wound Description Full thickness 02/17/23 1443   Dressing Status Intact 02/17/23 1443       Wound 02/17/23 Venous Ulcer Leg Right; Lower;Mid;Medial (Active)   Wound Image Images linked 02/17/23 1434   Wound Description Epithelialization;Slough; Yellow;Pink 02/17/23 1442   Deedee-wound Assessment Hyperpigmented;Edema 02/17/23 1442   Wound Length (cm) 3 2 cm 02/17/23 1442   Wound Width (cm) 4 5 cm 02/17/23 1442   Wound Depth (cm) 0 1 cm 02/17/23 1442   Wound Surface Area (cm^2) 14 4 cm^2 02/17/23 1442   Wound Volume (cm^3) 1 44 cm^3 02/17/23 1442   Calculated Wound Volume (cm^3) 1 44 cm^3 02/17/23 1442   Drainage Amount Moderate 02/17/23 1442   Drainage Description Serosanguineous; Yellow 02/17/23 1442   Non-staged Wound Description Full thickness 02/17/23 1442   Dressing Status Intact 02/17/23 1442       Wound 12/16/22 Other (comment) Leg Inner;Right (Active)   Date First Assessed/Time First Assessed: 12/16/22 0909   Pre-Existing Wound: No  Primary Wound Type: Other (comment)  Location: Leg  Wound Location Orientation: Inner;Right  Dressing Status: Clean;Dry; Intact       Wound 01/13/23 Ankle Right;Medial (Active)   Date First Assessed/Time First Assessed: 01/13/23 1308   Location: Ankle  Wound Location Orientation: Right;Medial       Wound 02/17/23 Venous Ulcer Leg Right; Lower;Mid;Medial (Active)   Date First Assessed/Time First Assessed: 02/17/23 1433   Primary Wound Type: Venous Ulcer  Location: Leg  Wound Location Orientation: Right; Lower;Mid;Medial       [REMOVED] Incision 08/17/18 Leg Right (Removed)   Resolved Date: (c) 09/05/22  Date First Assessed/Time First Assessed: 08/17/18 1127   Location: Leg  Wound Location Orientation: Right       [REMOVED] Wound 09/05/22 Surgical Abdomen Right (Removed)   Resolved Date: 10/25/22  Date First Assessed/Time First Assessed: 09/05/22 1505   Pre-Existing Wound: Yes  Primary Wound Type: Surgical  Location: Abdomen  Wound Location Orientation: Right  Wound Description (Comments): IR Drain tube       [REMOVED] Wound 10/31/22 Surgical Abdomen Right;Quadrant; Upper (Removed)   Resolved Date: 11/30/22  Date First Assessed/Time First Assessed: 10/31/22 1422   Pre-Existing Wound: Yes  Primary Wound Type: Surgical  Location: Abdomen  Wound Location Orientation: Right;Quadrant; Upper  Wound Description (Comments): IR Gail tube p  [REMOVED] Wound 11/09/22 Venous Ulcer Ankle Right;Medial (Removed)   Resolved Date: 11/30/22  Date First Assessed/Time First Assessed: 11/09/22 2200   Pre-Existing Wound: No  Primary Wound Type: Venous Ulcer  Location: Ankle  Wound Location Orientation: Right;Medial  Dressing Status: Clean;Dry; Intact  Wound Outcome: He    [REMOVED] Wound 11/11/22 Pressure Injury Buttocks Right; Inner (Removed)   Resolved Date: 11/30/22  Date First Assessed/Time First Assessed: 11/11/22 1200   Pre-Existing Wound: No  Primary Wound Type: Pressure Injury  Location: Buttocks  Wound Location Orientation: Right; Inner  Wound Description (Comments): stage 2 pressure     Subjective:        Patient presents to the [facility] wound care center for follow-up visit of right lower extremity wounds  New wound present today on right anterior pretibial area  Patient denies increased edema  No new complaints  Denies fever chills and increased pain        The following portions of the patient's history were reviewed and updated as appropriate:   He  has a past medical history of Anemia, Arthritis, Atrial fibrillation ([facility] ), Basal cell carcinoma (03/22/2022), CHF (congestive heart failure) ([facility] ), Diabetes mellitus [facility]), DVT (deep vein thrombosis) in pregnancy (1966), DVT (deep venous thrombosis) ([facility] ) (1966), Dyslipidemia, Encephalopathy acute (11/4/2022), GERD (gastroesophageal reflux disease), Hyperlipidemia, Hypertension, Hypomagnesemia (10/19/2022), Irregular heart beat, Morbid obesity due to excess calories ([facility] ), Pulmonary embolism ([place] [address] ), Sepsis ([place] [address] ), Squamous cell skin cancer (07/30/2020), and Visual impairment    He   Patient Active Problem List    Diagnosis Date Noted   • Cholecystostomy care [facility]) 11/21/2022   • Physical deconditioning 11/21/2022   • Gout 11/21/2022   • Insomnia due to medical condition 11/21/2022   • Leg pain 11/07/2022   • Acute on chronic cholecystitis 11/04/2022   • Acute cholecystitis 08/30/2022   • Benign prostatic hyperplasia with lower urinary tract symptoms 08/09/2022   • OBINNA (obstructive sleep apnea)    • CKD (chronic kidney disease) stage 3, GFR 30-59 ml/min ([facility] ) 05/07/2022   • Acute kidney injury ([facility] ) 11/18/2021   • Osteoarthritis, knee 11/15/2021   • Status post right knee replacement 11/15/2021   • Mixed hyperlipidemia 03/29/2021   • Swelling of limb 02/04/2021   • Chronic deep vein thrombosis (DVT) ([facility] ) 09/10/2020   • Secondary lymphedema 09/24/2018   • Venous ulcer of ankle, right ([facility] ) 07/11/2018   • S/P AVR 03/22/2018   • Thrombophlebitis 03/22/2018   • Presence of implantable cardioverter-defibrillator (ICD) 03/22/2018   • Chronic bilateral low back pain without sciatica 04/24/2017   • Compression fracture of L4 lumbar vertebra 04/24/2017   • BPH (benign prostatic hyperplasia) 04/20/2017   • Obesity, unspecified obesity severity, unspecified obesity type 04/04/2017   • Hydrocele 04/04/2017   • Chronic anticoagulation 04/04/2017   • Anemia 04/04/2017   • Renal cyst 04/04/2017   • Epididymitis 04/02/2017   • Cellulitis of scrotum 04/02/2017   • Dental abscess 12/26/2016   • 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 08/11/2015   • Ventricular tachycardia 04/15/2014   • Paroxysmal atrial fibrillation (HCC) 03/19/2014   • Edema 03/19/2014   • Chronic diastolic CHF (congestive heart failure) ([place] [address] ) 03/19/2014   • Endocarditis 03/19/2014   • Type 2 diabetes mellitus with diabetic neuropathy, without long-term current use of insulin ([place] [address] ) 08/19/2013   • Peripheral venous insufficiency 08/19/2013   • Cardiomegaly 08/19/2013   • PVD (peripheral vascular disease) ([place] [address] ) 04/19/2013   • Dyslipidemia 04/19/2013     He  has a past surgical history that includes Vascular surgery; Aortic valve replacement; Total knee arthroplasty (Left); Cardiac surgery (02/2014); Insert / replace / remove pacemaker; Cardiac defibrillator placement (04/2014); Joint replacement (Left); Samson tooth extraction; Colonoscopy; pr ligj divj&strip long saph saphfem junct kne/belw (Right, 8/17/2018); Vena cava filter placement; Replacement total knee (Right); Mohs surgery (07/30/2020); Mohs surgery (04/18/2022); IR cholecystostomy tube placement (9/1/2022); IR cholecystostomy tube check/change/reposition/reinsertion/upsize (10/13/2022); IR cholecystostomy tube check/change/reposition/reinsertion/upsize (10/19/2022); IR cholecystostomy tube check/change/reposition/reinsertion/upsize (10/27/2022); IR cholecystostomy tube check/change/reposition/reinsertion/upsize (11/7/2022); IR cholecystostomy tube check/change/reposition/reinsertion/upsize (11/10/2022); IR cholecystostomy tube check/change/reposition/reinsertion/upsize (12/1/2022); IR cholecystostomy tube check/change/reposition/reinsertion/upsize (1/6/2023); and IR cholecystostomy tube check/change/reposition/reinsertion/upsize (1/24/2023)  His family history includes Arthritis in his daughter, father, and mother; Cancer in his father; Stroke in his mother  He  reports that he has never smoked  He has never used smokeless tobacco  He reports that he does not currently use alcohol  He reports that he does not use drugs    Current Outpatient Medications Medication Sig Dispense Refill   • Acetaminophen (TYLENOL EXTRA STRENGTH PO) Take 500 mg by mouth if needed (for pain as needed)  Indications: pain as needed     • albuterol (PROVENTIL HFA,VENTOLIN HFA) 90 mcg/act inhaler Inhale 2 puffs every 6 (six) hours as needed for wheezing or shortness of breath     • allopurinol (ZYLOPRIM) 100 mg tablet Take 1 tablet (100 mg total) by mouth daily Do not start before November 15, 2022  • atorvastatin (LIPITOR) 40 mg tablet Take 40 mg by mouth daily after dinner Atorvastatin Calcium 40 MG Oral Tablet Take 1 tablet daily  Refills: 0  Active      • B Complex-C (SUPER B COMPLEX PO) Take 1 capsule by mouth daily      • Diclofenac Sodium (VOLTAREN) 1 % Apply 2 g topically 4 (four) times a day     • fluticasone (FLONASE) 50 mcg/act nasal spray 2 sprays into each nostril daily as needed Shake liquid and spray     • gabapentin (NEURONTIN) 100 mg capsule Take 100 mg by mouth daily     • Iron Combinations (NIFEREX) TABS Take 1 tablet by mouth in the morning  5   • metFORMIN (GLUCOPHAGE) 1000 MG tablet Take 1,000 mg by mouth daily with dinner  Indications: Type 2 Diabetes     • mirtazapine (REMERON) 7 5 MG tablet Take 15 mg by mouth daily at bedtime ordered by Dr Beckie Hurtado     • Multiple Vitamin (MULTIVITAMINS PO) Take 1 tablet by mouth daily     • omeprazole (PriLOSEC) 20 mg delayed release capsule Omeprazole 20 MG Oral Tablet Delayed Release Take 1 tablet daily  Refills: 0  Active     • polyethylene glycol (GLYCOLAX) 17 GM/SCOOP powder Take 17 g by mouth 2 (two) times a day     • sodium chloride, PF, 0 9 % 5 mL by Intracatheter route daily Intracatheter flushing daily 150 mL 1   • sodium chloride, PF, 0 9 % 10 mL by Intracatheter route daily Intracatheter flushing daily  May substitute prefilled syringe with normal saline 10 mL vials, 10 mL syringes, and 18 g blunt needles (Patient taking differently: 10 mL by Intracatheter route every 12 (twelve) hours   Intracatheter flushing daily  May substitute prefilled syringe with normal saline 10 mL vials, 10 mL syringes, and 18 g blunt needles  Indications: FLUSHING) 300 mL 2   • sotalol (BETAPACE) 80 mg tablet Take 1 tablet (80 mg total) by mouth daily  0   • torsemide (DEMADEX) 20 mg tablet Take 20 mg by mouth daily and 10 mg in PM     • warfarin (COUMADIN) 2 mg tablet Take 2 tablets (4 mg total) by mouth daily Acknowledge 14 tablet 0     No current facility-administered medications for this visit  He is allergic to tramadol       Review of Systems   Constitutional: Negative for chills and fever  HENT: Negative for ear pain and sore throat  Eyes: Negative for pain and visual disturbance  Respiratory: Negative for cough and shortness of breath  Cardiovascular: Positive for leg swelling  Negative for chest pain and palpitations  Gastrointestinal: Negative for abdominal pain and vomiting  Genitourinary: Negative for dysuria and hematuria  Musculoskeletal: Positive for gait problem  Negative for arthralgias and back pain  Skin: Positive for wound  Negative for color change and rash  Neurological: Negative for seizures and syncope  All other systems reviewed and are negative  Objective:       Wound 01/13/23 Ankle Right;Medial (Active)   Wound Image Images linked 02/17/23 1435   Wound Description Yellow;Pink;Slough 02/17/23 1443   Deedee-wound Assessment Edema; Maceration;Pink 02/17/23 1443   Wound Length (cm) 2 1 cm 02/17/23 1443   Wound Width (cm) 1 3 cm 02/17/23 1443   Wound Depth (cm) 0 2 cm 02/17/23 1443   Wound Surface Area (cm^2) 2 73 cm^2 02/17/23 1443   Wound Volume (cm^3) 0 546 cm^3 02/17/23 1443   Calculated Wound Volume (cm^3) 0 55 cm^3 02/17/23 1443   Change in Wound Size % 74 77 02/17/23 1443   Drainage Amount Moderate 02/17/23 1443   Drainage Description Serosanguineous;Castanon;Yellow 02/17/23 1443   Non-staged Wound Description Full thickness 02/17/23 1443   Dressing Status Intact 02/17/23 1443 Wound 02/17/23 Venous Ulcer Leg Right; Lower;Mid;Medial (Active)   Wound Image Images linked 02/17/23 1434   Wound Description Epithelialization;Slough; Yellow;Pink 02/17/23 1442   Deedee-wound Assessment Hyperpigmented;Edema 02/17/23 1442   Wound Length (cm) 3 2 cm 02/17/23 1442   Wound Width (cm) 4 5 cm 02/17/23 1442   Wound Depth (cm) 0 1 cm 02/17/23 1442   Wound Surface Area (cm^2) 14 4 cm^2 02/17/23 1442   Wound Volume (cm^3) 1 44 cm^3 02/17/23 1442   Calculated Wound Volume (cm^3) 1 44 cm^3 02/17/23 1442   Drainage Amount Moderate 02/17/23 1442   Drainage Description Serosanguineous; Yellow 02/17/23 1442   Non-staged Wound Description Full thickness 02/17/23 1442   Dressing Status Intact 02/17/23 1442       /66   Pulse 76   Temp (!) 96 2 °F (35 7 °C)   Resp 20                 Wound Instructions:  Orders Placed This Encounter   Procedures   • Wound compression and edema control     Elastic Tubular Stocking     Size G     Tubular elastic bandage: Apply from base of toes to behind the knee  Apply in AM, may remove for sleep      Avoid prolonged standing in one place      Elevate leg(s) above the level of the heart when sitting or as much as possible          Continue use of compression pumps at home     Standing Status:   Future     Standing Expiration Date:   2/17/2024   • Wound cleansing and dressings     Right ankle wound:     Wash your hands with soap and water  Remove old dressing, discard into plastic bag and place in trash  Cleanse the wound with NSS prior to applying a clean dressing  Do not use tissue or cotton balls  Do not scrub the wound  Pat dry using gauze      Apply Moisturizing cream to intact skin   Apply Silver alginate to the wound    Cover with abd  Secure with juanjo and tape     Change dressing every other day as needed for excessive drainage     Above treatment performed at wound care center                                        Standing Status:   Future     Standing Expiration Date: 2/17/2024   • Debridement     This order was created via procedure documentation   • Debridement     This order was created via procedure documentation        Diagnosis ICD-10-CM Associated Orders   1  Chronic venous hypertension (idiopathic) with ulcer of right lower extremity ([facility])  I87 311 lidocaine (LMX) 4 % cream    L97 919 Wound compression and edema control     Wound cleansing and dressings     Debridement     Debridement      2  Non-pressure chronic ulcer of right ankle with fat layer exposed ([facility] )  L97 312 Debridement     Debridement      3   Type 2 diabetes mellitus with diabetic neuropathy, without long-term current use of insulin ([facility])  E11 40

## 2023-03-01 ENCOUNTER — TELEPHONE (OUTPATIENT)
Dept: RADIOLOGY | Facility: HOSPITAL | Age: 88
End: 2023-03-01

## 2023-03-01 NOTE — PRE-PROCEDURE INSTRUCTIONS
Pre-procedure Instructions for Interventional Radiology  [address] [place] [address] Dr  [place] [address] [phone]    You are scheduled for a/an Percutaneous Cholecystolithotomy  On Wednesday 3/8/23  Your tentative arrival time is 0930  Short stay will notify you the day before your procedure with the exact arrival time and the location to arrive  To prepare for your procedure:  1  Please arrange for someone to drive you home after the procedure and stay with you until the next morning if you are instructed to do so  This is typically for patients receiving some type of sedative or anesthetic for the procedure  2  DO NOT EAT OR DRINK ANYTHING after midnight on the evening before your procedure including candy & gum   3  ONLY SIPS OF WATER with your medications are allowed on the morning of your procedure  4  TAKE ALL OF YOUR REGULAR MEDICATIONS THE MORNING OF YOUR PROCEDURE with sips of water! We may call you to stop some of your blood sugar, blood pressure and blood thinning medications depending on the procedure  Please take all of these medications unless we instruct you to stop them  5  If you have an allergy to x-ray dye, please contact Interventional Radiology for an x-ray dye preparation which usually consists of an oral steroid and Benadryl  The day of your procedure:  1  Bring a list of the medications you take at home  2  Bring medications you take for breathing problems (such as inhalers), medications for chest pain, or both  3  Bring a case for your glasses or contacts  4  Bring your insurance card and a form of photo ID   5  Please leave all valuables such as credit cards and jewelry at home  6  Report to the registration desk in the main lobby at the [facility]  Ask to be directed to [facility]    7  While your procedure is being performed, your family may wait in the Radiology Waiting Room on the 1st floor in Radiology  if they need to leave, they may provide a number to be called following the procedure  8  Be prepared to stay overnight just in case  Sometimes procedures will indicate the need for further observation or treatment  9  If you are scheduled for a follow-up visit with the Interventional Radiologist after your procedure, you will be called with a date and time      Special Instructions (Medications to stop taking before your procedure etc ):  Coumadin LD 3/2/23 and restart 3/8/23 PM

## 2023-03-02 ENCOUNTER — REMOTE DEVICE CLINIC VISIT (OUTPATIENT)
Dept: CARDIOLOGY CLINIC | Facility: CLINIC | Age: 88
End: 2023-03-02

## 2023-03-02 DIAGNOSIS — Z95.810 PRESENCE OF AUTOMATIC CARDIOVERTER/DEFIBRILLATOR (AICD): Primary | ICD-10-CM

## 2023-03-02 NOTE — PROGRESS NOTES
MDT DUAL CHAMBER ICD/ NOT MRI CONDITIONAL   CARELINK TRANSMISSION: BATTERY VOLTAGE ADEQUATE (2 2 YRS)  AP 1 6%  0 1% (AAI-DDD 50)  ALL AVAILABLE LEAD PARAMETERS WITHIN NORMAL LIMITS  4 AT/AF EPISODES WITH PAF, UNDERSENSING ON EGM'S STORED; MAX EPISODE DURATION 07:36:40 HRS  HX: PAF & PATIENT ON WARFARIN, SOTALOL   NORMAL DEVICE FUNCTION    ES

## 2023-03-03 ENCOUNTER — TELEPHONE (OUTPATIENT)
Dept: WOUND CARE | Facility: HOSPITAL | Age: 88
End: 2023-03-03

## 2023-03-03 ENCOUNTER — OFFICE VISIT (OUTPATIENT)
Dept: WOUND CARE | Facility: HOSPITAL | Age: 88
End: 2023-03-03

## 2023-03-03 VITALS
HEART RATE: 82 BPM | TEMPERATURE: 96.2 F | SYSTOLIC BLOOD PRESSURE: 132 MMHG | DIASTOLIC BLOOD PRESSURE: 63 MMHG | RESPIRATION RATE: 12 BRPM

## 2023-03-03 DIAGNOSIS — I87.311 CHRONIC VENOUS HYPERTENSION (IDIOPATHIC) WITH ULCER OF RIGHT LOWER EXTREMITY (HCC): Primary | ICD-10-CM

## 2023-03-03 DIAGNOSIS — L97.919 CHRONIC VENOUS HYPERTENSION (IDIOPATHIC) WITH ULCER OF RIGHT LOWER EXTREMITY (HCC): Primary | ICD-10-CM

## 2023-03-03 DIAGNOSIS — L97.312 NON-PRESSURE CHRONIC ULCER OF RIGHT ANKLE WITH FAT LAYER EXPOSED (HCC): ICD-10-CM

## 2023-03-03 DIAGNOSIS — E11.40 TYPE 2 DIABETES MELLITUS WITH DIABETIC NEUROPATHY, WITHOUT LONG-TERM CURRENT USE OF INSULIN (HCC): ICD-10-CM

## 2023-03-03 RX ORDER — LIDOCAINE 40 MG/G
CREAM TOPICAL ONCE
Status: COMPLETED | OUTPATIENT
Start: 2023-03-03 | End: 2023-03-03

## 2023-03-03 RX ADMIN — LIDOCAINE 1 APPLICATION.: 40 CREAM TOPICAL at 13:37

## 2023-03-03 NOTE — PATIENT INSTRUCTIONS
Orders Placed This Encounter   Procedures    Wound cleansing and dressings     Elastic Tubular Stocking Size G     Tubular elastic bandage: Apply from base of toes to behind the knee  Apply in AM, may remove for sleep  Avoid prolonged standing in one place  Elevate leg(s) above the level of the heart when sitting or as much as possible  Continue use of compression pumps at home            --Wound cleansing and dressings      Right ankle wound:     Wash your hands with soap and water  Remove old dressing, discard into plastic bag and place in trash  Cleanse the wound with NSS prior to applying a clean dressing  Do not use tissue or cotton balls  Do not scrub the wound  Pat dry using gauze  Apply Moisturizing cream to intact skin   Apply Silver alginate to the wound    Cover with abd  Secure with juanjo and tape     Change dressing every other day as needed for excessive drainage     Above treatment performed at wound care center                                                         Standing Status:   Future     Standing Expiration Date:   3/3/2024

## 2023-03-03 NOTE — TELEPHONE ENCOUNTER
Called CS to get [facility] arterial Duplex scheduled for pt  Scheduled for 3/21/23 @ 1130am  Called pt to inform of appt and verbally gave instructions to location and arrival expectations, pt verbalized understanding instructions

## 2023-03-03 NOTE — PROGRESS NOTES
Patient ID: Jez Quintero is a 81 y o  male Date of Birth 1935     Chief Complaint  Chief Complaint   Patient presents with   • Follow Up Wound Care Visit     RLE wound       Allergies  Tramadol    Assessment:       Diagnoses and all orders for this visit:    Chronic venous hypertension (idiopathic) with ulcer of right lower extremity (HCC)  -     lidocaine (LMX) 4 % cream  -     Cancel: Wound cleansing and dressings; Future  -     Wound cleansing and dressings; Future    Non-pressure chronic ulcer of right ankle with fat layer exposed ([address] )    Type 2 diabetes mellitus with diabetic neuropathy, without long-term current use of insulin (HCC)              Debridement   Wound 01/13/23 Ankle Right;Medial    Universal Protocol:  Procedure performed by: (ODESSA)  Consent: Verbal consent obtained  Risks and benefits: risks, benefits and alternatives were discussed  Consent given by: patient  Time out: Immediately prior to procedure a "time out" was called to verify the correct patient, procedure, equipment, support staff and site/side marked as required  Timeout called at: 3/3/2023 2:06 PM   Patient understanding: patient states understanding of the procedure being performed  Patient identity confirmed: verbally with patient      Performed by: physician and NP  Debridement type: surgical  Level of debridement: subcutaneous tissue  Pain control: lidocaine 4%  Post-debridement measurements  Length (cm): 2 6  Width (cm): 2  Depth (cm): 0 4  Percent debrided: 100%  Surface Area (cm^2): 5 2  Area debrided (cm^2): 5 2  Volume (cm^3): 2 08  Tissue and other material debrided: subcutaneous tissue  Devitalized tissue debrided: biofilm, exudate, fibrin and slough  Instrument(s) utilized: curette  Bleeding: small  Hemostasis obtained with: pressure  Procedural pain (0-10): 0  Post-procedural pain: 0   Response to treatment: procedure was tolerated well                 Plan:  Follow-up visit for RLE wounds    New partial thickness wounds noted to the R medial leg, wounds are otherwise stable in appearance  Wounds are related to the edema  No s/s of infection present  R medial ankle wound was surgically debrided today  Recommend to continue with current wound management with silver alginate and tubi-  Recommend to obtain new ABIs to evaluate for higher level of compression  Continue with leg elevation and leg pumps for edema management  Protein intake  A1C results reviewed with the patient today  Follow-up in 3 weeks, call sooner with questions or concerns  Wound 01/13/23 Ankle Right;Medial (Active)   Wound Image Images linked 03/03/23 1331   Wound Description Slough; Yellow;Granulation tissue; Epithelialization 03/03/23 1312   Deedee-wound Assessment Pink;Edema; Maceration 03/03/23 1312   Wound Length (cm) 2 6 cm 03/03/23 1312   Wound Width (cm) 2 cm 03/03/23 1312   Wound Depth (cm) 0 3 cm 03/03/23 1312   Wound Surface Area (cm^2) 5 2 cm^2 03/03/23 1312   Wound Volume (cm^3) 1 56 cm^3 03/03/23 1312   Calculated Wound Volume (cm^3) 1 56 cm^3 03/03/23 1312   Change in Wound Size % 28 44 03/03/23 1312   Drainage Amount Moderate 03/03/23 1312   Drainage Description Serosanguineous 03/03/23 1312   Non-staged Wound Description Full thickness 03/03/23 1312   Patient Tolerance Tolerated well 03/03/23 1312   Dressing Status Intact 03/03/23 1312       Wound 02/17/23 Venous Ulcer Leg Right; Lower;Mid;Anterior (Active)   Wound Image Images linked 03/03/23 1315   Wound Description Dry;Epithelialization;Pink;Slough 03/03/23 1315   Deedee-wound Assessment Dry 03/03/23 1315   Wound Length (cm) 0 4 cm 03/03/23 1315   Wound Width (cm) 0 6 cm 03/03/23 1315   Wound Depth (cm) 0 1 cm 03/03/23 1315   Wound Surface Area (cm^2) 0 24 cm^2 03/03/23 1315   Wound Volume (cm^3) 0 024 cm^3 03/03/23 1315   Calculated Wound Volume (cm^3) 0 02 cm^3 03/03/23 1315   Change in Wound Size % 98 61 03/03/23 1315   Drainage Amount Moderate 03/03/23 1315 Non-staged Wound Description Full thickness 03/03/23 1315   Patient Tolerance Tolerated well 03/03/23 1315   Dressing Status Intact 03/03/23 1315       Wound 03/03/23 Leg Right;Medial (Active)   Wound Image Images linked 03/03/23 1331   Wound Description [place] 03/03/23 1331   Deedee-wound Assessment Intact 03/03/23 1331   Wound Length (cm) 0 7 cm 03/03/23 1331   Wound Width (cm) 5 7 cm 03/03/23 1331   Wound Depth (cm) 0 1 cm 03/03/23 1331   Wound Surface Area (cm^2) 3 99 cm^2 03/03/23 1331   Wound Volume (cm^3) 0 399 cm^3 03/03/23 1331   Calculated Wound Volume (cm^3) 0 4 cm^3 03/03/23 1331   Drainage Amount Moderate 03/03/23 1331   Drainage Description Serosanguineous 03/03/23 1331   Patient Tolerance Tolerated well 03/03/23 1331   Dressing Status Intact 03/03/23 1331       Wound 01/13/23 Ankle Right;Medial (Active)   Date First Assessed/Time First Assessed: 01/13/23 1308   Location: Ankle  Wound Location Orientation: Right;Medial       Wound 02/17/23 Venous Ulcer Leg Right; Lower;Mid;Anterior (Active)   Date First Assessed/Time First Assessed: 02/17/23 1433   Primary Wound Type: Venous Ulcer  Location: Leg  Wound Location Orientation: Right; Lower;Mid;Anterior       Wound 03/03/23 Leg Right;Medial (Active)   Date First Assessed/Time First Assessed: 03/03/23 1329   Location: Leg  Wound Location Orientation: Right;Medial       [REMOVED] Incision 08/17/18 Leg Right (Removed)   Resolved Date: (c) 09/05/22  Date First Assessed/Time First Assessed: 08/17/18 1127   Location: Leg  Wound Location Orientation: Right       [REMOVED] Wound 09/05/22 Surgical Abdomen Right (Removed)   Resolved Date: 10/25/22  Date First Assessed/Time First Assessed: 09/05/22 1505   Pre-Existing Wound: Yes  Primary Wound Type: Surgical  Location: Abdomen  Wound Location Orientation: Right  Wound Description (Comments): IR Drain tube       [REMOVED] Wound 10/31/22 Surgical Abdomen Right;Quadrant; Upper (Removed)   Resolved Date: 11/30/22  Date First Assessed/Time First Assessed: 10/31/22 1422   Pre-Existing Wound: Yes  Primary Wound Type: Surgical  Location: Abdomen  Wound Location Orientation: Right;Quadrant; Upper  Wound Description (Comments): IR Gail tube p  [REMOVED] Wound 11/09/22 Venous Ulcer Ankle Right;Medial (Removed)   Resolved Date: 11/30/22  Date First Assessed/Time First Assessed: 11/09/22 2200   Pre-Existing Wound: No  Primary Wound Type: Venous Ulcer  Location: Ankle  Wound Location Orientation: Right;Medial  Dressing Status: Clean;Dry; Intact  Wound Outcome: He    [REMOVED] Wound 11/11/22 Pressure Injury Buttocks Right; Inner (Removed)   Resolved Date: 11/30/22  Date First Assessed/Time First Assessed: 11/11/22 1200   Pre-Existing Wound: No  Primary Wound Type: Pressure Injury  Location: Buttocks  Wound Location Orientation: Right; Inner  Wound Description (Comments): stage 2 pressure     [REMOVED] Wound 12/16/22 Other (comment) Leg Inner;Right (Removed)   Resolved Date: 03/03/23  Date First Assessed/Time First Assessed: 12/16/22 0909   Pre-Existing Wound: No  Primary Wound Type: Other (comment)  Location: Leg  Wound Location Orientation: Inner;Right  Dressing Status: Clean;Dry; Intact  Wound Outcome: Ot    Subjective:        Follow-up visit for R lower extremity wounds  Wife reports new area of wounds to the right medial leg  They have been using silver alginate to the wounds  Patient is elevating his legs, wearing the tubi- and using his leg pumps  Denies fever, chills, or increased pain or drainage         The following portions of the patient's history were reviewed and updated as appropriate:   He  has a past medical history of Anemia, Arthritis, Atrial fibrillation ([facility] ), Basal cell carcinoma (03/22/2022), CHF (congestive heart failure) ([facility] ), Diabetes mellitus ([facility] ), DVT (deep vein thrombosis) in pregnancy (1966), DVT (deep venous thrombosis) ([facility] ) (1966), Dyslipidemia, Encephalopathy acute (11/4/2022), GERD (gastroesophageal reflux disease), Hyperlipidemia, Hypertension, Hypomagnesemia (10/19/2022), Irregular heart beat, Morbid obesity due to excess calories ([facility] ), Pulmonary embolism ([facility] ), Sepsis ([facility] ), Squamous cell skin cancer (07/30/2020), and Visual impairment    He   Patient Active Problem List    Diagnosis Date Noted   • Cholecystostomy care [facility]) 11/21/2022   • Physical deconditioning 11/21/2022   • Gout 11/21/2022   • Insomnia due to medical condition 11/21/2022   • Leg pain 11/07/2022   • Acute on chronic cholecystitis 11/04/2022   • Acute cholecystitis 08/30/2022   • Benign prostatic hyperplasia with lower urinary tract symptoms 08/09/2022   • OBINNA (obstructive sleep apnea)    • CKD (chronic kidney disease) stage 3, GFR 30-59 ml/min ([facility] ) 05/07/2022   • Acute kidney injury ([facility] ) 11/18/2021   • Osteoarthritis, knee 11/15/2021   • Status post right knee replacement 11/15/2021   • Mixed hyperlipidemia 03/29/2021   • Swelling of limb 02/04/2021   • Chronic deep vein thrombosis (DVT) ([facility] ) 09/10/2020   • Secondary lymphedema 09/24/2018   • Venous ulcer of ankle, right ([facility] ) 07/11/2018   • S/P AVR 03/22/2018   • Thrombophlebitis 03/22/2018   • Presence of implantable cardioverter-defibrillator (ICD) 03/22/2018   • Chronic bilateral low back pain without sciatica 04/24/2017   • Compression fracture of L4 lumbar vertebra 04/24/2017   • BPH (benign prostatic hyperplasia) 04/20/2017   • Obesity, unspecified obesity severity, unspecified obesity type 04/04/2017   • Hydrocele 04/04/2017   • Chronic anticoagulation 04/04/2017   • Anemia 04/04/2017   • Renal cyst 04/04/2017   • Epididymitis 04/02/2017   • Cellulitis of scrotum 04/02/2017   • Dental abscess 12/26/2016   • 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 08/11/2015   • Ventricular tachycardia 04/15/2014   • Paroxysmal atrial fibrillation ([address] ) 03/19/2014   • Edema 03/19/2014   • Chronic diastolic CHF (congestive heart failure) ([facility] ) 03/19/2014   • Endocarditis 03/19/2014   • Type 2 diabetes mellitus with diabetic neuropathy, without long-term current use of insulin ([facility] ) 08/19/2013   • Peripheral venous insufficiency 08/19/2013   • Cardiomegaly 08/19/2013   • PVD (peripheral vascular disease) ([facility] ) 04/19/2013   • Dyslipidemia 04/19/2013     He  has a past surgical history that includes Vascular surgery; Aortic valve replacement; Total knee arthroplasty (Left); Cardiac surgery (02/2014); Insert / replace / remove pacemaker; Cardiac defibrillator placement (04/2014); Joint replacement (Left); [place] tooth extraction; Colonoscopy; pr ligj divj&strip long saph saphfem junct kne/belw (Right, 8/17/2018); Vena cava filter placement; Replacement total knee (Right); Mohs surgery (07/30/2020); Mohs surgery (04/18/2022); IR cholecystostomy tube placement (9/1/2022); IR cholecystostomy tube check/change/reposition/reinsertion/upsize (10/13/2022); IR cholecystostomy tube check/change/reposition/reinsertion/upsize (10/19/2022); IR cholecystostomy tube check/change/reposition/reinsertion/upsize (10/27/2022); IR cholecystostomy tube check/change/reposition/reinsertion/upsize (11/7/2022); IR cholecystostomy tube check/change/reposition/reinsertion/upsize (11/10/2022); IR cholecystostomy tube check/change/reposition/reinsertion/upsize (12/1/2022); IR cholecystostomy tube check/change/reposition/reinsertion/upsize (1/6/2023); and IR cholecystostomy tube check/change/reposition/reinsertion/upsize (1/24/2023)  His family history includes Arthritis in his daughter, father, and mother; Cancer in his father; Stroke in his mother  He  reports that he has never smoked  He has never used smokeless tobacco  He reports that he does not currently use alcohol  He reports that he does not use drugs       Review of Systems   Constitutional: Negative for chills and fever  HENT: Negative for congestion and sneezing  Respiratory: Negative for cough and shortness of breath  Cardiovascular: Positive for leg swelling  Musculoskeletal: Positive for gait problem  Skin: Positive for wound  Psychiatric/Behavioral: Negative for agitation  Objective:       Wound 01/13/23 Ankle Right;Medial (Active)   Wound Image Images linked 03/03/23 1331   Wound Description Slough; Yellow;Granulation tissue; Epithelialization 03/03/23 1312   Deedee-wound Assessment Pink;Edema; Maceration 03/03/23 1312   Wound Length (cm) 2 6 cm 03/03/23 1312   Wound Width (cm) 2 cm 03/03/23 1312   Wound Depth (cm) 0 3 cm 03/03/23 1312   Wound Surface Area (cm^2) 5 2 cm^2 03/03/23 1312   Wound Volume (cm^3) 1 56 cm^3 03/03/23 1312   Calculated Wound Volume (cm^3) 1 56 cm^3 03/03/23 1312   Change in Wound Size % 28 44 03/03/23 1312   Drainage Amount Moderate 03/03/23 1312   Drainage Description Serosanguineous 03/03/23 1312   Non-staged Wound Description Full thickness 03/03/23 1312   Patient Tolerance Tolerated well 03/03/23 1312   Dressing Status Intact 03/03/23 1312       Wound 02/17/23 Venous Ulcer Leg Right; Lower;Mid;Anterior (Active)   Wound Image Images linked 03/03/23 1315   Wound Description Dry;Epithelialization;Pink;Slough 03/03/23 1315   Deedee-wound Assessment Dry 03/03/23 1315   Wound Length (cm) 0 4 cm 03/03/23 1315   Wound Width (cm) 0 6 cm 03/03/23 1315   Wound Depth (cm) 0 1 cm 03/03/23 1315   Wound Surface Area (cm^2) 0 24 cm^2 03/03/23 1315   Wound Volume (cm^3) 0 024 cm^3 03/03/23 1315   Calculated Wound Volume (cm^3) 0 02 cm^3 03/03/23 1315   Change in Wound Size % 98 61 03/03/23 1315   Drainage Amount Moderate 03/03/23 1315   Non-staged Wound Description Full thickness 03/03/23 1315   Patient Tolerance Tolerated well 03/03/23 1315   Dressing Status Intact 03/03/23 1315       Wound 03/03/23 Leg Right;Medial (Active)   Wound Image Images linked 03/03/23 1331   Wound Description [place] 03/03/23 1331   Deedee-wound Assessment Intact 03/03/23 1331   Wound Length (cm) 0 7 cm 03/03/23 1331   Wound Width (cm) 5 7 cm 03/03/23 1331   Wound Depth (cm) 0 1 cm 03/03/23 1331   Wound Surface Area (cm^2) 3 99 cm^2 03/03/23 1331   Wound Volume (cm^3) 0 399 cm^3 03/03/23 1331   Calculated Wound Volume (cm^3) 0 4 cm^3 03/03/23 1331   Drainage Amount Moderate 03/03/23 1331   Drainage Description Serosanguineous 03/03/23 1331   Patient Tolerance Tolerated well 03/03/23 1331   Dressing Status Intact 03/03/23 1331       /63   Pulse 82   Temp (!) 96 2 °F (35 7 °C)   Resp 12     Physical Exam  Constitutional:       General: He is not in acute distress  Appearance: He is obese  He is not ill-appearing, toxic-appearing or diaphoretic  HENT:      Head: Normocephalic and atraumatic  Right Ear: External ear normal       Left Ear: External ear normal    Eyes:      Conjunctiva/sclera: Conjunctivae normal    Pulmonary:      Effort: Pulmonary effort is normal  No respiratory distress  Skin:     General: Skin is warm and dry  Findings: Wound present  Comments: See wound assessment for details  Neurological:      Mental Status: He is alert  Gait: Gait abnormal    Psychiatric:         Mood and Affect: Mood normal          Behavior: Behavior normal            Wound Instructions:  Orders Placed This Encounter   Procedures   • Wound cleansing and dressings     Elastic Tubular Stocking Size G     Tubular elastic bandage: Apply from base of toes to behind the knee  Apply in AM, may remove for sleep      Avoid prolonged standing in one place      Elevate leg(s) above the level of the heart when sitting or as much as possible          Continue use of compression pumps at home            --Wound cleansing and dressings      Right ankle wound:     Wash your hands with soap and water  Remove old dressing, discard into plastic bag and place in trash  Cleanse the wound with NSS prior to applying a clean dressing  Do not use tissue or cotton balls  Do not scrub the wound   Pat dry using gauze      Apply Moisturizing cream to intact skin   Apply Silver alginate to the wound  Cover with abd  Secure with juanjo and tape     Change dressing every other day as needed for excessive drainage     Above treatment performed at wound care center                                                         Standing Status:   Future     Standing Expiration Date:   3/3/2024        Diagnosis ICD-10-CM Associated Orders   1  Chronic venous hypertension (idiopathic) with ulcer of right lower extremity (HCC)  I87 311 lidocaine (LMX) 4 % cream    L97 919 Wound cleansing and dressings      2  Non-pressure chronic ulcer of right ankle with fat layer exposed ([address] )  L97 312       3   Type 2 diabetes mellitus with diabetic neuropathy, without long-term current use of insulin ([address] )  E11 40           Scribe Attestation    I,:  ODESSA Jeong am acting as a scribe while in the presence of the attending physician :       I,:  Ligia Berman DO personally performed the services described in this documentation    as scribed in my presence :

## 2023-03-08 ENCOUNTER — ANESTHESIA (OUTPATIENT)
Dept: RADIOLOGY | Facility: HOSPITAL | Age: 88
End: 2023-03-08

## 2023-03-08 ENCOUNTER — ANESTHESIA EVENT (OUTPATIENT)
Dept: RADIOLOGY | Facility: HOSPITAL | Age: 88
End: 2023-03-08

## 2023-03-08 ENCOUNTER — HOSPITAL ENCOUNTER (OUTPATIENT)
Dept: RADIOLOGY | Facility: HOSPITAL | Age: 88
End: 2023-03-08
Attending: RADIOLOGY

## 2023-03-08 VITALS
DIASTOLIC BLOOD PRESSURE: 67 MMHG | RESPIRATION RATE: 18 BRPM | BODY MASS INDEX: 39.89 KG/M2 | OXYGEN SATURATION: 96 % | WEIGHT: 301 LBS | SYSTOLIC BLOOD PRESSURE: 120 MMHG | HEART RATE: 77 BPM | TEMPERATURE: 97.5 F | HEIGHT: 73 IN

## 2023-03-08 DIAGNOSIS — K80.20 CALCULUS OF GALLBLADDER WITHOUT CHOLECYSTITIS WITHOUT OBSTRUCTION: Primary | Chronic | ICD-10-CM

## 2023-03-08 DIAGNOSIS — K81.2 ACUTE ON CHRONIC CHOLECYSTITIS: ICD-10-CM

## 2023-03-08 LAB
INR PPP: 1.34 (ref 0.84–1.19)
PROTHROMBIN TIME: 16.8 SECONDS (ref 11.6–14.5)

## 2023-03-08 RX ORDER — LIDOCAINE HYDROCHLORIDE 10 MG/ML
INJECTION, SOLUTION EPIDURAL; INFILTRATION; INTRACAUDAL; PERINEURAL AS NEEDED
Status: COMPLETED | OUTPATIENT
Start: 2023-03-08 | End: 2023-03-08

## 2023-03-08 RX ORDER — SODIUM CHLORIDE 9 MG/ML
75 INJECTION, SOLUTION INTRAVENOUS CONTINUOUS
Status: DISPENSED | OUTPATIENT
Start: 2023-03-08

## 2023-03-08 RX ORDER — ONDANSETRON 2 MG/ML
INJECTION INTRAMUSCULAR; INTRAVENOUS AS NEEDED
Status: DISCONTINUED | OUTPATIENT
Start: 2023-03-08 | End: 2023-03-08

## 2023-03-08 RX ORDER — DEXMEDETOMIDINE HYDROCHLORIDE 100 UG/ML
INJECTION, SOLUTION INTRAVENOUS AS NEEDED
Status: DISCONTINUED | OUTPATIENT
Start: 2023-03-08 | End: 2023-03-08

## 2023-03-08 RX ORDER — SODIUM CHLORIDE 9 MG/ML
INJECTION, SOLUTION INTRAVENOUS CONTINUOUS PRN
Status: DISCONTINUED | OUTPATIENT
Start: 2023-03-08 | End: 2023-03-08

## 2023-03-08 RX ORDER — FENTANYL CITRATE 50 UG/ML
INJECTION, SOLUTION INTRAMUSCULAR; INTRAVENOUS AS NEEDED
Status: DISCONTINUED | OUTPATIENT
Start: 2023-03-08 | End: 2023-03-08

## 2023-03-08 RX ADMIN — ONDANSETRON 4 MG: 2 INJECTION INTRAMUSCULAR; INTRAVENOUS at 12:08

## 2023-03-08 RX ADMIN — LIDOCAINE HYDROCHLORIDE 10 ML: 10 INJECTION, SOLUTION EPIDURAL; INFILTRATION; INTRACAUDAL; PERINEURAL at 12:26

## 2023-03-08 RX ADMIN — FENTANYL CITRATE 50 MCG: 50 INJECTION, SOLUTION INTRAMUSCULAR; INTRAVENOUS at 13:06

## 2023-03-08 RX ADMIN — DEXMEDETOMIDINE 8 MCG: 100 INJECTION, SOLUTION, CONCENTRATE INTRAVENOUS at 12:50

## 2023-03-08 RX ADMIN — FENTANYL CITRATE 25 MCG: 50 INJECTION, SOLUTION INTRAMUSCULAR; INTRAVENOUS at 13:37

## 2023-03-08 RX ADMIN — FENTANYL CITRATE 25 MCG: 50 INJECTION, SOLUTION INTRAMUSCULAR; INTRAVENOUS at 12:25

## 2023-03-08 RX ADMIN — DEXMEDETOMIDINE 4 MCG: 100 INJECTION, SOLUTION, CONCENTRATE INTRAVENOUS at 12:43

## 2023-03-08 RX ADMIN — DEXMEDETOMIDINE 8 MCG: 100 INJECTION, SOLUTION, CONCENTRATE INTRAVENOUS at 12:18

## 2023-03-08 RX ADMIN — FENTANYL CITRATE 25 MCG: 50 INJECTION, SOLUTION INTRAMUSCULAR; INTRAVENOUS at 12:21

## 2023-03-08 RX ADMIN — DEXMEDETOMIDINE 12 MCG: 100 INJECTION, SOLUTION, CONCENTRATE INTRAVENOUS at 12:08

## 2023-03-08 RX ADMIN — FENTANYL CITRATE 25 MCG: 50 INJECTION, SOLUTION INTRAMUSCULAR; INTRAVENOUS at 12:13

## 2023-03-08 RX ADMIN — DEXMEDETOMIDINE 4 MCG: 100 INJECTION, SOLUTION, CONCENTRATE INTRAVENOUS at 12:31

## 2023-03-08 RX ADMIN — FENTANYL CITRATE 25 MCG: 50 INJECTION, SOLUTION INTRAMUSCULAR; INTRAVENOUS at 13:29

## 2023-03-08 RX ADMIN — FENTANYL CITRATE 25 MCG: 50 INJECTION, SOLUTION INTRAMUSCULAR; INTRAVENOUS at 12:08

## 2023-03-08 RX ADMIN — SODIUM CHLORIDE: 0.9 INJECTION, SOLUTION INTRAVENOUS at 11:37

## 2023-03-08 RX ADMIN — CEFTRIAXONE SODIUM 2 G: 10 INJECTION, POWDER, FOR SOLUTION INTRAVENOUS at 11:59

## 2023-03-08 RX ADMIN — SODIUM CHLORIDE 75 ML/HR: 0.9 INJECTION, SOLUTION INTRAVENOUS at 10:00

## 2023-03-08 RX ADMIN — IOHEXOL 20 ML: 350 INJECTION, SOLUTION INTRAVENOUS at 14:32

## 2023-03-08 RX ADMIN — DEXMEDETOMIDINE 8 MCG: 100 INJECTION, SOLUTION, CONCENTRATE INTRAVENOUS at 13:37

## 2023-03-08 NOTE — ANESTHESIA POSTPROCEDURE EVALUATION
Post-Op Assessment Note    CV Status:  Stable  Pain Score: 0    Pain management: adequate     Mental Status:  Alert and awake   Hydration Status:  Euvolemic   PONV Controlled:  Controlled   Airway Patency:  Patent      Post Op Vitals Reviewed: Yes      Staff: CRNA, Anesthesiologist         No notable events documented      BP   152/78   Temp      Pulse  82   Resp   14   SpO2   97

## 2023-03-08 NOTE — ANESTHESIA PREPROCEDURE EVALUATION
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      Although he has cardiac clearance, the surgery would be complex given the presence of the fistula and Dr Richi Freire has referred the patient for percutaneous removal of the stones      Will proceed with scheduling the procedure under mac/general anesthesia  He prefers [facility] as he lives closest to that campus                  Dr Richi Freire Note from 1/23/2023    acute cholecystitis requiring hospitalization  He has had intermittent tube checks revealing cholelithiasis as well as 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  The tube cannot be clamped as it becomes clogged      Returns for follow-up visit  He denies upper abdominal pain  He has no energy  Denies fevers or chills  He has occasional coughing associated with meals which is new      Examination reveals the biliary tube in good position  Abdomen is soft  Obese      There was hope that the stones could be removed percutaneously with interventional radiology  There is discussion within the group as to whether this should be pursued given the biliary fistula  Personally, I do not see a contraindication      I explained that he is very high risk for surgery  Although his mind is excellent, he is 81years old with significant morbidities  He is 307 pounds and not very active  Given the development of the fistula, operative repair would need to be open cholecystectomy and repair of colon    Interventional radiology plans on discussing and deciding on further course  All questions answered        Relevant Problems   CARDIO   (+) Chronic deep vein thrombosis (DVT) ([facility])   (+) Mixed hyperlipidemia   (+) Paroxysmal atrial fibrillation (HCC)   (+) S/P AVR   (+) Ventricular tachycardia      ENDO   (+) Type 2 diabetes mellitus with diabetic neuropathy, without long-term current use of insulin (HCC)      /RENAL   (+) Acute kidney injury (HCC)   (+) BPH (benign prostatic hyperplasia)   (+) Benign prostatic hyperplasia with lower urinary tract symptoms   (+) CKD (chronic kidney disease) stage 3, GFR 30-59 ml/min ([facility])   (+) Renal cyst      HEMATOLOGY   (+) Anemia      MUSCULOSKELETAL   (+) Chronic bilateral low back pain without sciatica   (+) Gout   (+) Osteoarthritis, knee      NEURO/PSYCH   (+) Chronic bilateral low back pain without sciatica      PULMONARY   (+) OBINNA (obstructive sleep apnea)        Physical Exam    Airway    Mallampati score: III  TM Distance: >3 FB  Neck ROM: full     Dental       Cardiovascular      Pulmonary  Rhonchi,     Other Findings       Epididymitis   Cellulitis of scrotum   Obesity, unspecified obesity severity, unspecified obesity type   Type 2 diabetes mellitus with diabetic neuropathy, without long-term current use of insulin ([facility])   Hydrocele   Chronic anticoagulation   Anemia   Paroxysmal atrial fibrillation ([facility])   S/P AVR   PVD (peripheral vascular disease) ([facility])   Thrombophlebitis   Presence of implantable cardioverter-defibrillator (ICD)   Edema   Venous ulcer of ankle, right ([facility])   Peripheral venous insufficiency   Ventricular tachycardia   Secondary lymphedema   CKD (chronic kidney disease) stage 3, GFR 30-59 ml/min ([facility])   Chronic diastolic CHF (congestive heart failure) ([facility])   OBINNA (obstructive sleep apnea)   Benign prostatic hyperplasia with lower urinary tract symptoms   Acute cholecystitis   Acute kidney injury ([address] )   Cardiomegaly   Chronic bilateral low back pain without sciatica   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   Compression fracture of L4 lumbar vertebra   Dyslipidemia   Endocarditis   Mixed hyperlipidemia   Osteoarthritis, knee   Renal cyst   Status post right knee replacement   Swelling of limb   BPH (benign prostatic hyperplasia)   Chronic deep vein thrombosis (DVT) (HCC)   Acute on chronic cholecystitis   Leg pain   Cholecystostomy care [facility])   Physical deconditioning   Gout   Insomnia due to medical condition       TTE Interpretation Summary 5/2022       •  Left Ventricle: Left ventricular cavity size is normal  Wall thickness is mildly increased  The left ventricular ejection fraction is 60%  Systolic function is normal  Wall motion is normal   •  Right Ventricle: Right ventricular cavity size is moderately dilated  Systolic function is moderately reduced  •  Left Atrium: The atrium is mildly dilated  •  Right Atrium: The atrium is mildly dilated  •  Aortic Valve: There is a bioprosthetic valve  •  Mitral Valve: There is moderate calcification  There is mild annular calcification  There is mild to moderate regurgitation  •  Tricuspid Valve: There is moderate regurgitation  •  Pulmonic Valve: There is mild regurgitation  •  Pulmonary Artery: The estimated pulmonary artery systolic pressure is 77 7 mmHg  The pulmonary artery systolic pressure is severely increased            Anesthesia Plan  ASA Score- 4     Anesthesia Type- IV sedation with anesthesia with ASA Monitors  Additional Monitors:   Airway Plan:     Comment: Will start with MAC if does not tolerate then will plan on GA  Plan Factors-Exercise tolerance (METS): <4 METS  Chart reviewed  EKG reviewed  Imaging results reviewed  Patient summary reviewed  Patient is not a current smoker  Induction- intravenous  Postoperative Plan-     Informed Consent- Anesthetic plan and risks discussed with patient  I personally reviewed this patient with the CRNA   Discussed and agreed on the Anesthesia Plan with the CRNA Thomasina Cooks

## 2023-03-08 NOTE — INTERVAL H&P NOTE
Update: (This section must be completed if the H&P was completed greater than 24 hrs to procedure or admission)    H&P reviewed  After examining the patient, I find no changed to the H&P since it had been written  /69 (BP Location: Left arm)   Pulse 90   Temp 97 8 °F (36 6 °C) (Temporal)   Resp 18   Ht 6' 1" (1 854 m)   Wt (!) 137 kg (301 lb)   SpO2 96%   BMI 39 71 kg/m²       Patient re-evaluated   Accept as history and physical     Benny Ly MD/March 8, 2023/2:17 PM

## 2023-03-08 NOTE — QUICK NOTE
Dr Tre Pate at bedside to assess patient- verbal okay to discharge patient after 1hr bedrest instead of 2hr, if patient feeling well and criteria met

## 2023-03-08 NOTE — SEDATION DOCUMENTATION
Percutaneous cholecystolithotomy performed by Dr Julianne Brooks without complication  Patient tolerated procedure well  Anesthesia present throughout case  IR Procedure Bedrest Start Time is 5774  Report called to short stay nurse

## 2023-03-08 NOTE — BRIEF OP NOTE (RAD/CATH)
INTERVENTIONAL RADIOLOGY PROCEDURE NOTE    Date: 3/8/2023    Procedure: IR PERCUTANEOUS GALLSTONE REMOVAL (PERCUTANEOUS CHOLECYSTOLITHOTOMY)     Preoperative diagnosis:   1  Acute on chronic cholecystitis         Postoperative diagnosis: Same  Surgeon: Rossy Sanders MD     Assistant: None  No qualified resident was available  Blood loss: None    Specimens: None    Findings: Multiple gallstones removed using cholangioscopy, EHL, and endoscopic basket  Will follow up in 1 week with catheter check to evaluate for residual stones  Complications: None immediate      Anesthesia: local and MAC sedation

## 2023-03-08 NOTE — DISCHARGE INSTRUCTIONS
Percutaneous Cholecystostomy Tube  Percutaneous Cholecystostomy tube is a drain tube placed into your gallbladder  This tube is placed because of infection and or gallstones,  or because the gallbladder cannot empty internally through the cystic duct  The length of time the tube remains in place will be determined by your physician and the Interventional Radiologist     *The tube needs to be changed every 3 months or sooner if any problems should occur*    If you have questions or complications with the tube please Contact Interventional Radiology at [phone] Dennys PATIENTS: Contact Interventional Radiology at [phone]) Yecenia Ovalles PATIENTS: Contact Interventional Radiology at [phone] and care of the tube: You may receive a prescription for 10 ml pre-filled saline syringes  [address] [place] [address] Pharmacy) is one of the best places to get this filled because they usually have these saline syringes available  Otherwise please check with your pharmacy for availability  They often have to special order them  Other supplies include gauze dressings, alcohol wipes and tape  Flushing Instructions:  1) Flush your Gail tube daily with 10 ml's of Normal Saline  2) Wipe the tube or flush port with an alcohol pad   3) attach the saline syringe to the tube/port with a twisting motion (clockwise) do not pull back "aspirate" and flush the saline into the tube  4)discard the syringe (do not use syringe more than once to prevent infection)  5) replace drain bag to tube  Make sure drain tube and bag have no kinks and remains draining/patent  You may shower if you cover the dressing with plastic  Change the dressing after your shower to keep tube site clean and dry  Change the dressing every 1-2 days or sooner if becomes soiled  Wear loose comfortable clothing so there is no pulling or tugging on the tube   To prevent the tube from kinking or falling out, do not let the drain bag hang loosely       Contact Interventional Radiology if you have any of the followin)Fever greater than 101 Farenheit and or chills within 24 hours of tube placement or change   2) unusual pain around tube   3)If your tube pulls back or fall out or stops draining  4) if the skin around the tube is red, irritated or a foul discharge is noted  5) Difficulty flushing the tube or leaking at the site with flushing

## 2023-03-15 ENCOUNTER — HOSPITAL ENCOUNTER (OUTPATIENT)
Dept: RADIOLOGY | Facility: HOSPITAL | Age: 88
Discharge: HOME | End: 2023-03-15
Attending: RADIOLOGY

## 2023-03-15 ENCOUNTER — HOSPITAL ENCOUNTER (OUTPATIENT)
Dept: RADIOLOGY | Facility: HOSPITAL | Age: 88
Discharge: HOME | End: 2023-03-15

## 2023-03-15 DIAGNOSIS — K81.2 ACUTE ON CHRONIC CHOLECYSTITIS: ICD-10-CM

## 2023-03-15 DIAGNOSIS — K80.10 CALCULUS OF GALLBLADDER WITH CHRONIC CHOLECYSTITIS WITHOUT OBSTRUCTION: Primary | Chronic | ICD-10-CM

## 2023-03-15 DIAGNOSIS — K81.2 ACUTE ON CHRONIC CHOLECYSTITIS: Primary | ICD-10-CM

## 2023-03-15 DIAGNOSIS — K80.20 CALCULUS OF GALLBLADDER WITHOUT CHOLECYSTITIS WITHOUT OBSTRUCTION: Chronic | ICD-10-CM

## 2023-03-15 RX ADMIN — IOHEXOL 10 ML: 350 INJECTION, SOLUTION INTRAVENOUS at 08:27

## 2023-03-15 NOTE — SEDATION DOCUMENTATION
Abdominal CT scan completed  Discharged home via wheelchair with spouse  Verbalizes understanding of discharge instructions

## 2023-03-15 NOTE — SEDATION DOCUMENTATION
Percutaneous Cholecystostomy Drain tube check completed by Dr Mihai Edwards  Tube capped as per Dr Mihai Edwards  CT abdomen today as per Dr Mihai Edwards  Patient sent home with bag in case fails capping trial   To return in 1-2 weeks for Perc   Gail drain check and possible removal

## 2023-03-15 NOTE — BRIEF OP NOTE (RAD/CATH)
INTERVENTIONAL RADIOLOGY PROCEDURE NOTE    Date: 3/15/2023    Procedure:   Procedure Summary     Date: 03/15/23 Room / Location: [address] [place] [address] Interventional Radiology    Anesthesia Start:  Anesthesia Stop:     Procedure: IR CHOLECYSTOSTOMY TUBE CHECK/CHANGE/REPOSITION/REINSERTION/UPSIZE Diagnosis:       Calculus of gallbladder without cholecystitis without obstruction      (s/p stone removal- eval for residual stones  capping trial if no stones seen )    Scheduled Providers: Hector Simmons MD Responsible Provider:     Anesthesia Type: Not recorded ASA Status: Not recorded          Preoperative diagnosis:   1  Calculus of gallbladder without cholecystitis without obstruction         Postoperative diagnosis: Same  Surgeon: Hector Simmons MD     Assistant: None  No qualified resident was available  Blood loss: none    Specimens: none     Findings: No definite residual stones  CT with contrast in gallbladder shows possible single tiny stone  Will proceed with 1 week capping trial prior to catheter removal     Complications: None immediate      Anesthesia: none

## 2023-03-15 NOTE — DISCHARGE INSTRUCTIONS
Percutaneous Cholecystostomy Tube  Percutaneous Cholecystostomy tube is a drain tube placed into your gallbladder  This tube is placed because of infection and or gallstones,  or because the gallbladder cannot empty internally through the cystic duct  The length of time the tube remains in place will be determined by your physician and the Interventional Radiologist     *The tube needs to be changed every 3 months or sooner if any problems should occur*    If you have questions or complications with the tube please Contact Interventional Radiology at [phone] Dennys PATIENTS: Contact Interventional Radiology at [phone]) Yecenia Rheajasson PATIENTS: Contact Interventional Radiology at [phone] and care of the tube: You may receive a prescription for 10 ml pre-filled saline syringes  [facility]) is one of the best places to get this filled because they usually have these saline syringes available  Otherwise please check with your pharmacy for availability  They often have to special order them  Other supplies include gauze dressings, alcohol wipes and tape  Flushing Instructions:  1) Flush your Gail tube daily with 10 ml's of Normal Saline  2) Wipe the tube or flush port with an alcohol pad   3) attach the saline syringe to the tube/port with a twisting motion (clockwise) do not pull back "aspirate" and flush the saline into the tube  4)discard the syringe (do not use syringe more than once to prevent infection)  5) replace drain bag to tube  Make sure drain tube and bag have no kinks and remains draining/patent  You may shower if you cover the dressing with plastic  Change the dressing after your shower to keep tube site clean and dry  Change the dressing every 1-2 days or sooner if becomes soiled  Wear loose comfortable clothing so there is no pulling or tugging on the tube   To prevent the tube from kinking or falling out, do not let the drain bag hang loosely       Contact Interventional Radiology if you have any of the followin)Fever greater than 101 Farenheit and or chills within 24 hours of tube placement or change   2) unusual pain around tube   3)If your tube pulls back or fall out or stops draining  4) if the skin around the tube is red, irritated or a foul discharge is noted  5) Difficulty flushing the tube or leaking at the site with flushing

## 2023-03-21 ENCOUNTER — HOSPITAL ENCOUNTER (OUTPATIENT)
Dept: NON INVASIVE DIAGNOSTICS | Facility: HOSPITAL | Age: 88
Discharge: HOME | End: 2023-03-21

## 2023-03-21 DIAGNOSIS — I87.311 CHRONIC VENOUS HYPERTENSION (IDIOPATHIC) WITH ULCER OF RIGHT LOWER EXTREMITY (HCC): ICD-10-CM

## 2023-03-21 DIAGNOSIS — L97.919 CHRONIC VENOUS HYPERTENSION (IDIOPATHIC) WITH ULCER OF RIGHT LOWER EXTREMITY (HCC): ICD-10-CM

## 2023-03-21 DIAGNOSIS — E11.40 TYPE 2 DIABETES MELLITUS WITH DIABETIC NEUROPATHY, WITHOUT LONG-TERM CURRENT USE OF INSULIN (HCC): ICD-10-CM

## 2023-03-21 DIAGNOSIS — L97.312 NON-PRESSURE CHRONIC ULCER OF RIGHT ANKLE WITH FAT LAYER EXPOSED (HCC): ICD-10-CM

## 2023-03-22 ENCOUNTER — HOSPITAL ENCOUNTER (OUTPATIENT)
Dept: RADIOLOGY | Facility: HOSPITAL | Age: 88
Discharge: HOME | End: 2023-03-22
Attending: RADIOLOGY | Admitting: RADIOLOGY

## 2023-03-22 DIAGNOSIS — K80.10 CALCULUS OF GALLBLADDER WITH CHRONIC CHOLECYSTITIS WITHOUT OBSTRUCTION: Chronic | ICD-10-CM

## 2023-03-22 RX ADMIN — IOHEXOL 5 ML: 350 INJECTION, SOLUTION INTRAVENOUS at 10:46

## 2023-03-22 NOTE — DISCHARGE INSTRUCTIONS
Drainage Tube Removal    Your drainage tube was removed today  What you need know at home:   Keep a clean dry dressing at the tube site until the small opening closes  It will take twenty four to forty eight hours  Keep the site dry until it heals  A small amount of drainage on your dressing is normal  Resume your normal diet  Small sips of flat soda will help with any nausea  Contact Interventional Radiology for any of the following: You have pain, fever greater than 101, shaking chills  If you have increased redness or swelling at the site  I the drainage from your site does not stop  If the site drains pus or has a bad odor       Contact Interventional Radiology at [phone]   Dennys PATIENTS: Contact Interventional Radiology at [phone]) Cherry Arcos PATIENTS: Contact Interventional Radiology at [phone]) if:

## 2023-03-22 NOTE — SEDATION DOCUMENTATION
Cholecystostomy tube removed per Dr Rhianna Polanco  Procedure tolerated well, dry dressing applied to site

## 2023-03-24 ENCOUNTER — OFFICE VISIT (OUTPATIENT)
Dept: WOUND CARE | Facility: HOSPITAL | Age: 88
End: 2023-03-24

## 2023-03-24 VITALS
DIASTOLIC BLOOD PRESSURE: 71 MMHG | RESPIRATION RATE: 18 BRPM | HEART RATE: 81 BPM | TEMPERATURE: 97.1 F | SYSTOLIC BLOOD PRESSURE: 137 MMHG

## 2023-03-24 DIAGNOSIS — I87.311 CHRONIC VENOUS HYPERTENSION (IDIOPATHIC) WITH ULCER OF RIGHT LOWER EXTREMITY (HCC): Primary | ICD-10-CM

## 2023-03-24 DIAGNOSIS — L97.919 CHRONIC VENOUS HYPERTENSION (IDIOPATHIC) WITH ULCER OF RIGHT LOWER EXTREMITY (HCC): Primary | ICD-10-CM

## 2023-03-24 DIAGNOSIS — L97.312 NON-PRESSURE CHRONIC ULCER OF RIGHT ANKLE WITH FAT LAYER EXPOSED (HCC): ICD-10-CM

## 2023-03-24 RX ORDER — LIDOCAINE 40 MG/G
CREAM TOPICAL ONCE
Status: COMPLETED | OUTPATIENT
Start: 2023-03-24 | End: 2023-03-24

## 2023-03-24 RX ADMIN — LIDOCAINE 1 APPLICATION.: 40 CREAM TOPICAL at 13:35

## 2023-03-24 NOTE — PROGRESS NOTES
Patient ID: Wenceslao Mcardle is a 81 y o  male Date of Birth 1935     Chief Complaint  Chief Complaint   Patient presents with   • Follow Up Wound Care Visit     RLE       Allergies  Tramadol    Assessment:    No problem-specific Assessment & Plan notes found for this encounter  Diagnoses and all orders for this visit:    Chronic venous hypertension (idiopathic) with ulcer of right lower extremity (HCC)  -     Cancel: Wound cleansing and dressings; Future  -     Wound compression and edema control; Future  -     lidocaine (LMX) 4 % cream  -     Cancel: Wound cleansing and dressings; Future  -     Cancel: Wound cleansing and dressings; Future  -     Cancel: Wound cleansing and dressings; Future  -     Debridement  -     Wound cleansing and dressings; Future    Non-pressure chronic ulcer of right ankle with fat layer exposed ([facility] [address] )  -     Cancel: Wound cleansing and dressings; Future  -     Wound compression and edema control; Future  -     lidocaine (LMX) 4 % cream  -     Cancel: Wound cleansing and dressings; Future  -     Cancel: Wound cleansing and dressings; Future  -     Cancel: Wound cleansing and dressings; Future  -     Wound cleansing and dressings; Future              Debridement   Wound 01/13/23 Ankle Right;Medial    Universal Protocol:  Consent: Verbal consent obtained  Consent given by: patient  Time out: Immediately prior to procedure a "time out" was called to verify the correct patient, procedure, equipment, support staff and site/side marked as required    Timeout called at: 3/24/2023 1:41 PM   Patient understanding: patient states understanding of the procedure being performed  Patient identity confirmed: verbally with patient      Performed by: physician  Debridement type: selective  Pain control: lidocaine 4%  Post-debridement measurements  Length (cm): 13 5  Width (cm): 4  Depth (cm): 0 2  Percent debrided: 90%  Surface Area (cm^2): 54  Area debrided (cm^2): 48 6  Volume (cm^3): 10 8  Devitalized tissue debrided: biofilm  Instrument(s) utilized: curette  Bleeding: small  Hemostasis obtained with: pressure  Response to treatment: procedure was tolerated well          Plan:  Wound is significantly worse today, copious amounts of drainage with a mild odor noted today  Wound debrided as above  Continue wound management with silver alginate and use baby diapers as a secondary layer  Continue to change dressing daily  Cleanse with prophase  See wound orders below  Arterial studies obtained a few days ago and reviewed with patient and his wife today  ABIs were noncompressible bilaterally but toe pressures were adequate for healing although it was noted that toe pressures were less than previous study  Consider vascular referral  Extensively discussed the importance of edema reduction via compression and elevation  Resume use of Tubigrip size G  Keep legs elevated whenever seated and avoid prolonged standing  Minimize amount of time the patient is on his feet  Follow-up in 2 weeks or call sooner with questions or concerns    Wound 01/13/23 Ankle Right;Medial (Active)   Wound Image Images linked 03/24/23 1323   Wound Description Yellow;Pink;Slough;Granulation tissue; Epithelialization 03/24/23 1324   Deedee-wound Assessment Edema; Hyperpigmented; Maceration 03/24/23 1324   Wound Length (cm) 13 5 cm 03/24/23 1324   Wound Width (cm) 4 cm 03/24/23 1324   Wound Depth (cm) 0 2 cm 03/24/23 1324   Wound Surface Area (cm^2) 54 cm^2 03/24/23 1324   Wound Volume (cm^3) 10 8 cm^3 03/24/23 1324   Calculated Wound Volume (cm^3) 10 8 cm^3 03/24/23 1324   Change in Wound Size % -395 41 03/24/23 1324   Drainage Amount Copious 03/24/23 1324   Drainage Description Serosanguineous; Yellow; Tan 03/24/23 1324   Non-staged Wound Description Full thickness 03/24/23 1324   Dressing Status Intact 03/24/23 1324       Wound 02/17/23 Venous Ulcer Leg Right; Lower;Mid;Anterior (Active)   Wound Image Images linked 03/24/23 1323 Wound Description Epithelialization 03/24/23 1323   Deedee-wound Assessment Intact 03/24/23 1323   Drainage Amount None 03/24/23 1323   Non-staged Wound Description Not applicable 91/94/24 6173   Dressing Status Intact 03/24/23 1323       Wound 01/13/23 Ankle Right;Medial (Active)   Date First Assessed/Time First Assessed: 01/13/23 1308   Location: Ankle  Wound Location Orientation: Right;Medial       Wound 02/17/23 Venous Ulcer Leg Right; Lower;Mid;Anterior (Active)   Date First Assessed/Time First Assessed: 02/17/23 1433   Primary Wound Type: Venous Ulcer  Location: Leg  Wound Location Orientation: Right; Lower;Mid;Anterior  Wound Outcome: Healed       Wound 03/03/23 Leg Right;Medial (Active)   Date First Assessed/Time First Assessed: 03/03/23 1329   Location: Leg  Wound Location Orientation: Right;Medial       [REMOVED] Incision 08/17/18 Leg Right (Removed)   Resolved Date: (c) 09/05/22  Date First Assessed/Time First Assessed: 08/17/18 1127   Location: Leg  Wound Location Orientation: Right       [REMOVED] Wound 09/05/22 Surgical Abdomen Right (Removed)   Resolved Date: 10/25/22  Date First Assessed/Time First Assessed: 09/05/22 1505   Pre-Existing Wound: Yes  Primary Wound Type: Surgical  Location: Abdomen  Wound Location Orientation: Right  Wound Description (Comments): IR Drain tube       [REMOVED] Wound 10/31/22 Surgical Abdomen Right;Quadrant; Upper (Removed)   Resolved Date: 11/30/22  Date First Assessed/Time First Assessed: 10/31/22 1422   Pre-Existing Wound: Yes  Primary Wound Type: Surgical  Location: Abdomen  Wound Location Orientation: Right;Quadrant; Upper  Wound Description (Comments): IR Gail tube p         [REMOVED] Wound 11/09/22 Venous Ulcer Ankle Right;Medial (Removed)   Resolved Date: 11/30/22  Date First Assessed/Time First Assessed: 11/09/22 2200   Pre-Existing Wound: No  Primary Wound Type: Venous Ulcer  Location: Ankle  Wound Location Orientation: Right;Medial  Dressing Status: Clean;Dry; Intact  Wound Outcome: He    [REMOVED] Wound 11/11/22 Pressure Injury Buttocks Right; Inner (Removed)   Resolved Date: 11/30/22  Date First Assessed/Time First Assessed: 11/11/22 1200   Pre-Existing Wound: No  Primary Wound Type: Pressure Injury  Location: Buttocks  Wound Location Orientation: Right; Inner  Wound Description (Comments): stage 2 pressure     [REMOVED] Wound 12/16/22 Other (comment) Leg Inner;Right (Removed)   Resolved Date: 03/03/23  Date First Assessed/Time First Assessed: 12/16/22 0909   Pre-Existing Wound: No  Primary Wound Type: Other (comment)  Location: Leg  Wound Location Orientation: Inner;Right  Dressing Status: Clean;Dry; Intact  Wound Outcome: Ot    Subjective:        Patient presents for follow-up of right lower extremity venous ulcers  Patient and wife report an increase in drainage, no increased pain  They have been changing the dressing which is silver alginate daily instead of every other day due to the copious amounts of drainage  They report that at patient's last visit a tighter compression was used on his leg and he could not tolerate it  It caused pain but it also was bunching up and rolling down his leg causing issues    They replaced it with a home compression stocking which is 10 mmHg in strength      The following portions of the patient's history were reviewed and updated as appropriate:   He  has a past medical history of Anemia, Arthritis, Atrial fibrillation ([facility] [address] ), Basal cell carcinoma (03/22/2022), CHF (congestive heart failure) ([facility] [address] ), Diabetes mellitus ([facility] [address] ), DVT (deep vein thrombosis) in pregnancy (1966), DVT (deep venous thrombosis) ([facility] [address] ) (1966), Dyslipidemia, Encephalopathy acute (11/4/2022), GERD (gastroesophageal reflux disease), Hyperlipidemia, Hypertension, Hypomagnesemia (10/19/2022), Irregular heart beat, Morbid obesity due to excess calories ([facility] [address] ), Pulmonary embolism ([facility] [address] ), Sepsis ([facility] [address] ), Squamous cell skin cancer (07/30/2020), and Visual impairment    He   Patient Active Problem List    Diagnosis Date Noted   • Calculus of gallbladder 03/08/2023   • Cholecystostomy care ([place] [address] ) 11/21/2022   • Physical deconditioning 11/21/2022   • Gout 11/21/2022   • Insomnia due to medical condition 11/21/2022   • Leg pain 11/07/2022   • Acute on chronic cholecystitis 11/04/2022   • Acute cholecystitis 08/30/2022   • Benign prostatic hyperplasia with lower urinary tract symptoms 08/09/2022   • OBINNA (obstructive sleep apnea)    • CKD (chronic kidney disease) stage 3, GFR 30-59 ml/min ([place] [address] ) 05/07/2022   • Acute kidney injury ([place] [address] ) 11/18/2021   • Osteoarthritis, knee 11/15/2021   • Status post right knee replacement 11/15/2021   • Mixed hyperlipidemia 03/29/2021   • Swelling of limb 02/04/2021   • Chronic deep vein thrombosis (DVT) ([place] [address] ) 09/10/2020   • Secondary lymphedema 09/24/2018   • Venous ulcer of ankle, right ([place] [address] ) 07/11/2018   • S/P AVR 03/22/2018   • Thrombophlebitis 03/22/2018   • Presence of implantable cardioverter-defibrillator (ICD) 03/22/2018   • Chronic bilateral low back pain without sciatica 04/24/2017   • Compression fracture of L4 lumbar vertebra 04/24/2017   • BPH (benign prostatic hyperplasia) 04/20/2017   • Obesity, unspecified obesity severity, unspecified obesity type 04/04/2017   • Hydrocele 04/04/2017   • Chronic anticoagulation 04/04/2017   • Anemia 04/04/2017   • Renal cyst 04/04/2017   • Epididymitis 04/02/2017   • Cellulitis of scrotum 04/02/2017   • Dental abscess 12/26/2016   • 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 08/11/2015   • Ventricular tachycardia 04/15/2014   • Paroxysmal atrial fibrillation ([place] [address] ) 03/19/2014   • Edema 03/19/2014   • Chronic diastolic CHF (congestive heart failure) ([place] [address] ) 03/19/2014   • Endocarditis 03/19/2014   • Type 2 diabetes mellitus with diabetic neuropathy, without long-term current use of insulin ([place] [address] ) 08/19/2013   • Peripheral venous insufficiency 08/19/2013   • Cardiomegaly 08/19/2013   • PVD (peripheral vascular disease) ([facility] [address] ) 04/19/2013   • Dyslipidemia 04/19/2013     He  reports that he has never smoked  He has never used smokeless tobacco  He reports that he does not currently use alcohol  He reports that he does not use drugs  Current Outpatient Medications   Medication Sig Dispense Refill   • Acetaminophen (TYLENOL EXTRA STRENGTH PO) Take 500 mg by mouth if needed (for pain as needed)  Indications: pain as needed     • albuterol (PROVENTIL HFA,VENTOLIN HFA) 90 mcg/act inhaler Inhale 2 puffs every 6 (six) hours as needed for wheezing or shortness of breath     • allopurinol (ZYLOPRIM) 100 mg tablet Take 1 tablet (100 mg total) by mouth daily Do not start before November 15, 2022  • atorvastatin (LIPITOR) 40 mg tablet Take 40 mg by mouth daily after dinner Atorvastatin Calcium 40 MG Oral Tablet Take 1 tablet daily  Refills: 0  Active      • B Complex-C (SUPER B COMPLEX PO) Take 1 capsule by mouth daily      • Diclofenac Sodium (VOLTAREN) 1 % Apply 2 g topically 4 (four) times a day     • fluticasone (FLONASE) 50 mcg/act nasal spray 2 sprays into each nostril daily as needed Shake liquid and spray     • gabapentin (NEURONTIN) 100 mg capsule Take 100 mg by mouth daily     • Iron Combinations (NIFEREX) TABS Take 1 tablet by mouth in the morning  5   • metFORMIN (GLUCOPHAGE) 1000 MG tablet Take 1,000 mg by mouth daily with dinner  Indications: Type 2 Diabetes     • mirtazapine (REMERON) 7 5 MG tablet Take 15 mg by mouth daily at bedtime ordered by Dr Aysha Velazquez     • Multiple Vitamin (MULTIVITAMINS PO) Take 1 tablet by mouth daily     • omeprazole (PriLOSEC) 20 mg delayed release capsule Omeprazole 20 MG Oral Tablet Delayed Release Take 1 tablet daily  Refills: 0  Active     • polyethylene glycol (GLYCOLAX) 17 GM/SCOOP powder Take 17 g by mouth 2 (two) times a day     • sodium chloride, PF, 0 9 % 10 mL by Intracatheter route daily Intracatheter flushing daily   May substitute prefilled syringe with normal saline 10 mL vials, 10 mL syringes, and 18 g blunt needles (Patient taking differently: 10 mL by Intracatheter route every 12 (twelve) hours Intracatheter flushing daily  May substitute prefilled syringe with normal saline 10 mL vials, 10 mL syringes, and 18 g blunt needles) 300 mL 2   • sotalol (BETAPACE) 80 mg tablet Take 1 tablet (80 mg total) by mouth daily  0   • torsemide (DEMADEX) 20 mg tablet Take 20 mg by mouth daily and 10 mg in PM     • warfarin (COUMADIN) 2 mg tablet Take 2 tablets (4 mg total) by mouth daily Acknowledge 14 tablet 0     No current facility-administered medications for this visit  Facility-Administered Medications Ordered in Other Visits   Medication Dose Route Frequency Provider Last Rate Last Admin   • cefTRIAXone (ROCEPHIN) 2,000 mg in dextrose 5 % 50 mL IVPB  2,000 mg Intravenous Once Robert Rivas MD       • sodium chloride 0 9 % infusion  75 mL/hr Intravenous Continuous Robert Rivas MD 75 mL/hr at 03/08/23 1000 75 mL/hr at 03/08/23 1000     He is allergic to tramadol       Review of Systems   Constitutional: Negative for chills and fever  HENT: Negative for congestion and sneezing  Respiratory: Negative for cough  Cardiovascular: Positive for leg swelling  Skin: Positive for wound  Psychiatric/Behavioral: Negative for agitation  Objective:       Wound 01/13/23 Ankle Right;Medial (Active)   Wound Image Images linked 03/24/23 1323   Wound Description Yellow;Pink;Slough;Granulation tissue; Epithelialization 03/24/23 1324   Deedee-wound Assessment Edema; Hyperpigmented; Maceration 03/24/23 1324   Wound Length (cm) 13 5 cm 03/24/23 1324   Wound Width (cm) 4 cm 03/24/23 1324   Wound Depth (cm) 0 2 cm 03/24/23 1324   Wound Surface Area (cm^2) 54 cm^2 03/24/23 1324   Wound Volume (cm^3) 10 8 cm^3 03/24/23 1324   Calculated Wound Volume (cm^3) 10 8 cm^3 03/24/23 1324   Change in Wound Size % -395 41 03/24/23 1324   Drainage Amount Copious 03/24/23 1324   Drainage Description Serosanguineous; Yellow; Tan 03/24/23 1324   Non-staged Wound Description Full thickness 03/24/23 1324   Dressing Status Intact 03/24/23 1324       Wound 02/17/23 Venous Ulcer Leg Right; Lower;Mid;Anterior (Active)   Wound Image Images linked 03/24/23 1323   Wound Description Epithelialization 03/24/23 1323   Deedee-wound Assessment Intact 03/24/23 1323   Drainage Amount None 03/24/23 1323   Non-staged Wound Description Not applicable 70/09/68 5470   Dressing Status Intact 03/24/23 1323       /71   Pulse 81   Temp (!) 97 1 °F (36 2 °C)   Resp 18     Physical Exam        Wound 01/13/23 Ankle Right;Medial (Active)   Wound Image   03/24/23 1323   Wound Description Yellow;Pink;Slough;Granulation tissue; Epithelialization 03/24/23 1324   Deedee-wound Assessment Edema; Hyperpigmented; Maceration 03/24/23 1324   Wound Length (cm) 13 5 cm 03/24/23 1324   Wound Width (cm) 4 cm 03/24/23 1324   Wound Depth (cm) 0 2 cm 03/24/23 1324   Wound Surface Area (cm^2) 54 cm^2 03/24/23 1324   Wound Volume (cm^3) 10 8 cm^3 03/24/23 1324   Calculated Wound Volume (cm^3) 10 8 cm^3 03/24/23 1324   Change in Wound Size % -395 41 03/24/23 1324   Drainage Amount Copious 03/24/23 1324   Drainage Description Serosanguineous; Yellow; Tan 03/24/23 1324   Non-staged Wound Description Full thickness 03/24/23 1324   Patient Tolerance Tolerated well 01/13/23 1308   Dressing Status Intact 03/24/23 1324       Wound 02/17/23 Venous Ulcer Leg Right; Lower;Mid;Anterior (Active)   Wound Image   03/24/23 1323   Wound Description Epithelialization 03/24/23 1323   Deedee-wound Assessment Intact 03/24/23 1323   Wound Length (cm) 3 2 cm 02/17/23 1442   Wound Width (cm) 4 5 cm 02/17/23 1442   Wound Depth (cm) 0 1 cm 02/17/23 1442   Wound Surface Area (cm^2) 14 4 cm^2 02/17/23 1442   Wound Volume (cm^3) 1 44 cm^3 02/17/23 1442   Calculated Wound Volume (cm^3) 1 44 cm^3 02/17/23 1442   Drainage Amount None 03/24/23 1323 Drainage Description Serosanguineous; Yellow 02/17/23 1442   Non-staged Wound Description Not applicable 38/14/69 0030   Dressing Status Intact 03/24/23 1323       Wound 03/03/23 Leg Right;Medial (Active)                       Wound Instructions:  Orders Placed This Encounter   Procedures   • Wound compression and edema control     Elastic Tubular Stocking Size G  Tubular elastic bandage: Apply from base of toes to behind the knee  Apply in AM, may remove for sleep  Avoid prolonged standing in one place  Elevate leg(s) above the level of the heart when sitting or as much as possible  Continue use of compression pumps at home     Standing Status:   Future     Standing Expiration Date:   3/24/2024   • Debridement     This order was created via procedure documentation   • Wound cleansing and dressings     Right anterior leg wound is healed  Cleanse daily with mild soap and water, apply non-scented moisturizer    Right ankle wounds:  Cleanse the wounds with prophase prior to applying a clean dressing, rinse well  Do not use tissue or cotton balls  Do not  scrub the wounds  Pat dry using gauze  Apply Zinc cream to intact skin  Apply Silver alginate to the open wounds  Cover with abd (may use baby diapers for excess drainage)  Secure with juanjo and tape  Change dressing daily and PRN for soilage  Above treatment performed at wound care center     Standing Status:   Future     Standing Expiration Date:   3/24/2024        Diagnosis ICD-10-CM Associated Orders   1  Chronic venous hypertension (idiopathic) with ulcer of right lower extremity (HCC)  I87 311 Wound compression and edema control    L97 919 lidocaine (LMX) 4 % cream     Debridement     Wound cleansing and dressings      2   Non-pressure chronic ulcer of right ankle with fat layer exposed ([facility] [address] )  L97 312 Wound compression and edema control     lidocaine (LMX) 4 % cream     Wound cleansing and dressings

## 2023-03-24 NOTE — PATIENT INSTRUCTIONS
Orders Placed This Encounter   Procedures    Wound compression and edema control     Elastic Tubular Stocking Size G  Tubular elastic bandage: Apply from base of toes to behind the knee  Apply in AM, may remove for sleep  Avoid prolonged standing in one place  Elevate leg(s) above the level of the heart when sitting or as much as possible  Continue use of compression pumps at home     Standing Status:   Future     Standing Expiration Date:   3/24/2024    Debridement     This order was created via procedure documentation    Wound cleansing and dressings     Right anterior leg wound is healed  Cleanse daily with mild soap and water, apply non-scented moisturizer    Right ankle wounds:  Cleanse the wounds with prophase prior to applying a clean dressing, rinse well  Do not use tissue or cotton balls  Do not  scrub the wounds  Pat dry using gauze  Apply Zinc cream to intact skin  Apply Silver alginate to the open wounds    Cover with abd (may use baby diapers for excess drainage)  Secure with juanjo and tape  Change dressing daily and PRN for soilage  Above treatment performed at wound care center     Standing Status:   Future     Standing Expiration Date:   3/24/2024

## 2023-04-04 ENCOUNTER — APPOINTMENT (EMERGENCY)
Dept: CT IMAGING | Facility: HOSPITAL | Age: 88
End: 2023-04-04

## 2023-04-04 ENCOUNTER — HOSPITAL ENCOUNTER (INPATIENT)
Facility: HOSPITAL | Age: 88
LOS: 2 days | Discharge: HOME HEALTH | End: 2023-04-07
Attending: EMERGENCY MEDICINE | Admitting: INTERNAL MEDICINE

## 2023-04-04 ENCOUNTER — APPOINTMENT (EMERGENCY)
Dept: RADIOLOGY | Facility: HOSPITAL | Age: 88
End: 2023-04-04

## 2023-04-04 DIAGNOSIS — R10.9 ABDOMINAL PAIN: ICD-10-CM

## 2023-04-04 DIAGNOSIS — J96.00 ACUTE RESPIRATORY FAILURE (HCC): ICD-10-CM

## 2023-04-04 DIAGNOSIS — J18.9 PNEUMONIA: ICD-10-CM

## 2023-04-04 DIAGNOSIS — E83.42 HYPOMAGNESEMIA: ICD-10-CM

## 2023-04-04 DIAGNOSIS — N17.9 AKI (ACUTE KIDNEY INJURY) (HCC): ICD-10-CM

## 2023-04-04 DIAGNOSIS — R65.20 SEVERE SEPSIS (HCC): Primary | ICD-10-CM

## 2023-04-04 DIAGNOSIS — J90 PLEURAL EFFUSION: ICD-10-CM

## 2023-04-04 DIAGNOSIS — E87.20 LACTIC ACIDOSIS: ICD-10-CM

## 2023-04-04 DIAGNOSIS — U07.1 COVID-19: ICD-10-CM

## 2023-04-04 DIAGNOSIS — R18.8 ABDOMINAL FLUID COLLECTION: ICD-10-CM

## 2023-04-04 DIAGNOSIS — S81.801A NON-HEALING WOUND OF RIGHT LOWER EXTREMITY: ICD-10-CM

## 2023-04-04 DIAGNOSIS — I50.9 CHF (CONGESTIVE HEART FAILURE) (HCC): ICD-10-CM

## 2023-04-04 DIAGNOSIS — J18.9 PNEUMONIA OF BOTH LUNGS DUE TO INFECTIOUS ORGANISM, UNSPECIFIED PART OF LUNG: ICD-10-CM

## 2023-04-04 DIAGNOSIS — K81.2 ACUTE ON CHRONIC CHOLECYSTITIS: ICD-10-CM

## 2023-04-04 DIAGNOSIS — A41.9 SEVERE SEPSIS (HCC): Primary | ICD-10-CM

## 2023-04-04 LAB
2HR DELTA HS TROPONIN: 22 NG/L
ALBUMIN SERPL BCP-MCNC: 3.8 G/DL (ref 3.5–5)
ALP SERPL-CCNC: 103 U/L (ref 34–104)
ALT SERPL W P-5'-P-CCNC: 16 U/L (ref 7–52)
ANION GAP SERPL CALCULATED.3IONS-SCNC: 11 MMOL/L (ref 4–13)
APTT PPP: 53 SECONDS (ref 23–37)
AST SERPL W P-5'-P-CCNC: 29 U/L (ref 13–39)
BASE EXCESS BLDA CALC-SCNC: -2 MMOL/L (ref -2–3)
BASOPHILS # BLD AUTO: 0.05 THOUSANDS/ÂΜL (ref 0–0.1)
BASOPHILS NFR BLD AUTO: 0 % (ref 0–1)
BILIRUB SERPL-MCNC: 2.41 MG/DL (ref 0.2–1)
BILIRUB UR QL STRIP: NEGATIVE
BNP SERPL-MCNC: 493 PG/ML (ref 0–100)
BUN SERPL-MCNC: 20 MG/DL (ref 5–25)
CA-I BLD-SCNC: 1.17 MMOL/L (ref 1.12–1.32)
CALCIUM SERPL-MCNC: 9.2 MG/DL (ref 8.4–10.2)
CARDIAC TROPONIN I PNL SERPL HS: 11 NG/L
CARDIAC TROPONIN I PNL SERPL HS: 33 NG/L
CHLORIDE SERPL-SCNC: 103 MMOL/L (ref 96–108)
CLARITY UR: CLEAR
CO2 SERPL-SCNC: 24 MMOL/L (ref 21–32)
COLOR UR: NORMAL
CREAT SERPL-MCNC: 1.46 MG/DL (ref 0.6–1.3)
EOSINOPHIL # BLD AUTO: 0.09 THOUSAND/ÂΜL (ref 0–0.61)
EOSINOPHIL NFR BLD AUTO: 1 % (ref 0–6)
ERYTHROCYTE [DISTWIDTH] IN BLOOD BY AUTOMATED COUNT: 17.3 % (ref 11.6–15.1)
GFR SERPL CREATININE-BSD FRML MDRD: 42 ML/MIN/1.73SQ M
GLUCOSE SERPL-MCNC: 147 MG/DL (ref 65–140)
GLUCOSE SERPL-MCNC: 149 MG/DL (ref 65–140)
GLUCOSE UR STRIP-MCNC: NEGATIVE MG/DL
HCO3 BLDA-SCNC: 22.4 MMOL/L (ref 24–30)
HCT VFR BLD AUTO: 34.8 % (ref 36.5–49.3)
HCT VFR BLD CALC: 32 % (ref 36.5–49.3)
HGB BLD-MCNC: 10.5 G/DL (ref 12–17)
HGB BLDA-MCNC: 10.9 G/DL (ref 12–17)
HGB UR QL STRIP.AUTO: NEGATIVE
IMM GRANULOCYTES # BLD AUTO: 0.1 THOUSAND/UL (ref 0–0.2)
IMM GRANULOCYTES NFR BLD AUTO: 1 % (ref 0–2)
INR PPP: 4.8 (ref 0.84–1.19)
KETONES UR STRIP-MCNC: NEGATIVE MG/DL
LACTATE SERPL-SCNC: 2.3 MMOL/L (ref 0.5–2)
LACTATE SERPL-SCNC: 2.4 MMOL/L (ref 0.5–2)
LEUKOCYTE ESTERASE UR QL STRIP: NEGATIVE
LIPASE SERPL-CCNC: 24 U/L (ref 11–82)
LYMPHOCYTES # BLD AUTO: 1.01 THOUSANDS/ÂΜL (ref 0.6–4.47)
LYMPHOCYTES NFR BLD AUTO: 7 % (ref 14–44)
MAGNESIUM SERPL-MCNC: 1.7 MG/DL (ref 1.9–2.7)
MCH RBC QN AUTO: 28.1 PG (ref 26.8–34.3)
MCHC RBC AUTO-ENTMCNC: 30.2 G/DL (ref 31.4–37.4)
MCV RBC AUTO: 93 FL (ref 82–98)
MONOCYTES # BLD AUTO: 0.57 THOUSAND/ÂΜL (ref 0.17–1.22)
MONOCYTES NFR BLD AUTO: 4 % (ref 4–12)
NEUTROPHILS # BLD AUTO: 12.03 THOUSANDS/ÂΜL (ref 1.85–7.62)
NEUTS SEG NFR BLD AUTO: 87 % (ref 43–75)
NITRITE UR QL STRIP: NEGATIVE
NRBC BLD AUTO-RTO: 0 /100 WBCS
PCO2 BLD: 23 MMOL/L (ref 21–32)
PCO2 BLD: 34.2 MM HG (ref 42–50)
PH BLD: 7.42 [PH] (ref 7.3–7.4)
PH UR STRIP.AUTO: 5 [PH]
PLATELET # BLD AUTO: 324 THOUSANDS/UL (ref 149–390)
PMV BLD AUTO: 9.6 FL (ref 8.9–12.7)
PO2 BLD: 84 MM HG (ref 35–45)
POTASSIUM BLD-SCNC: 4.2 MMOL/L (ref 3.5–5.3)
POTASSIUM SERPL-SCNC: 4.4 MMOL/L (ref 3.5–5.3)
PROCALCITONIN SERPL-MCNC: 0.05 NG/ML
PROT SERPL-MCNC: 7.8 G/DL (ref 6.4–8.4)
PROT UR STRIP-MCNC: NEGATIVE MG/DL
PROTHROMBIN TIME: 45.2 SECONDS (ref 11.6–14.5)
RBC # BLD AUTO: 3.74 MILLION/UL (ref 3.88–5.62)
SAO2 % BLD FROM PO2: 97 % (ref 60–85)
SODIUM BLD-SCNC: 139 MMOL/L (ref 136–145)
SODIUM SERPL-SCNC: 138 MMOL/L (ref 135–147)
SP GR UR STRIP.AUTO: 1.01 (ref 1–1.03)
SPECIMEN SOURCE: ABNORMAL
UROBILINOGEN UR STRIP-ACNC: <2 MG/DL
WBC # BLD AUTO: 13.85 THOUSAND/UL (ref 4.31–10.16)

## 2023-04-04 RX ORDER — SODIUM CHLORIDE, SODIUM GLUCONATE, SODIUM ACETATE, POTASSIUM CHLORIDE, MAGNESIUM CHLORIDE, SODIUM PHOSPHATE, DIBASIC, AND POTASSIUM PHOSPHATE .53; .5; .37; .037; .03; .012; .00082 G/100ML; G/100ML; G/100ML; G/100ML; G/100ML; G/100ML; G/100ML
500 INJECTION, SOLUTION INTRAVENOUS ONCE
Status: COMPLETED | OUTPATIENT
Start: 2023-04-04 | End: 2023-04-05

## 2023-04-04 RX ORDER — MAGNESIUM SULFATE HEPTAHYDRATE 40 MG/ML
2 INJECTION, SOLUTION INTRAVENOUS ONCE
Status: COMPLETED | OUTPATIENT
Start: 2023-04-04 | End: 2023-04-04

## 2023-04-04 RX ORDER — ACETAMINOPHEN 650 MG/1
650 SUPPOSITORY RECTAL ONCE
Status: DISCONTINUED | OUTPATIENT
Start: 2023-04-04 | End: 2023-04-07

## 2023-04-04 RX ORDER — METRONIDAZOLE 500 MG/100ML
500 INJECTION, SOLUTION INTRAVENOUS EVERY 8 HOURS
Status: DISCONTINUED | OUTPATIENT
Start: 2023-04-04 | End: 2023-04-07

## 2023-04-04 RX ADMIN — SODIUM CHLORIDE, SODIUM GLUCONATE, SODIUM ACETATE, POTASSIUM CHLORIDE, MAGNESIUM CHLORIDE, SODIUM PHOSPHATE, DIBASIC, AND POTASSIUM PHOSPHATE 500 ML: .53; .5; .37; .037; .03; .012; .00082 INJECTION, SOLUTION INTRAVENOUS at 22:48

## 2023-04-04 RX ADMIN — MAGNESIUM SULFATE HEPTAHYDRATE 2 G: 40 INJECTION, SOLUTION INTRAVENOUS at 21:40

## 2023-04-04 RX ADMIN — METRONIDAZOLE 500 MG: 500 INJECTION, SOLUTION INTRAVENOUS at 20:09

## 2023-04-04 RX ADMIN — IOHEXOL 100 ML: 350 INJECTION, SOLUTION INTRAVENOUS at 21:22

## 2023-04-04 RX ADMIN — CEFTRIAXONE SODIUM 1000 MG: 10 INJECTION, POWDER, FOR SOLUTION INTRAVENOUS at 20:10

## 2023-04-04 NOTE — ED ATTENDING ATTESTATION
4/4/2023  I, Kev Bowles MD, saw and evaluated the patient  I have discussed the patient with the resident/non-physician practitioner and agree with the resident's/non-physician practitioner's findings, Plan of Care, and MDM as documented in the resident's/non-physician practitioner's note, except where noted  All available labs and Radiology studies were reviewed  I was present for key portions of any procedure(s) performed by the resident/non-physician practitioner and I was immediately available to provide assistance  At this point I agree with the current assessment done in the Emergency Department  I have conducted an independent evaluation of this patient a history and physical is as follows:    81-year-old male, presents in respiratory distress by EMS  On exam on arrival, patient in severe respiratory distress, tachypneic with decreased breath sounds  ED Course     Placed on BiPAP in ED with noted improvement in oxygen saturations and respiratory effort  Patient noted to be febrile, concern for severe sepsis, received IV fluids and IV antibiotics  CT scan findings reviewed, surgery consulted and admitted patient to ICU      Critical Care Time  CriticalCare Time    Date/Time: 4/4/2023 7:00 PM  Performed by: Kev Bowles MD  Authorized by: Kev Bowles MD     Critical care provider statement:     Critical care time (minutes):  45    Critical care time was exclusive of:  Separately billable procedures and treating other patients    Critical care was necessary to treat or prevent imminent or life-threatening deterioration of the following conditions:  Respiratory failure and sepsis    Critical care was time spent personally by me on the following activities:  Obtaining history from patient or surrogate, development of treatment plan with patient or surrogate, discussions with consultants, evaluation of patient's response to treatment, examination of patient, ordering and performing treatments and interventions, ordering and review of laboratory studies, ordering and review of radiographic studies, re-evaluation of patient's condition and review of old charts

## 2023-04-04 NOTE — SEPSIS NOTE
"  Sepsis Note   Phoebe Rivera 81 y o  male MRN: [id number]  Unit/Bed#: ED-21 Encounter: [id number]       Initial Sepsis Screening     Row Name 04/04/23 1943                Is the patient's history suggestive of a new or worsening infection? Yes (Proceed)  -MR        Suspected source of infection suspect infection, source unknown  -MR        Indicate SIRS criteria Hyperthemia > 38 3C (100 9F) OR Hypothermia <36C (96 8F); Tachycardia > 90 bpm;Tachypnea > 20 resp per min;Leukocytosis (WBC > 71539 IJL) OR Leukopenia (WBC <4000 IJL) OR Bandemia (WBC >10% bands)  -MR        Are two or more of the above signs & symptoms of infection both present and new to the patient? Yes (Proceed)  -MR        Assess for evidence of organ dysfunction: Are any of the below criteria present within 6 hours of suspected infection and SIRS criteria that are NOT considered to be chronic conditions? Lactate > 2  0;New need for invasive/non-invasive ventilation  -MR        Date of presentation of severe sepsis 04/04/23  -MR        Time of presentation of severe sepsis 1945  -MR        Sepsis Note: Click \"NEXT\" below (NOT \"close\") to generate sepsis note based on above information  YES (proceed by clicking \"NEXT\")  -MR              User Key  (r) = Recorded By, (t) = Taken By, (c) = Cosigned By    [address] Name Provider Type     Carmen Velázquez MD Resident                    There is no height or weight on file to calculate BMI    Wt Readings from Last 1 Encounters:   03/08/23 (!) 137 kg (301 lb)        Ideal body weight: 79 9 kg (176 lb 2 4 oz)  Adjusted ideal body weight: 103 kg (226 lb 1 4 oz)  "

## 2023-04-04 NOTE — ED PROVIDER NOTES
"History  Chief Complaint   Patient presents with   • Shortness of Breath     Pt to ED via EMS from home with c/o increased SOB, productive cough  Pt arrives on CPAP        History provided by:  Medical records   81-year-old male with history of A-fib on warfarin, peripheral vascular disease, type 2 diabetes complicated by chronic nonhealing foot wound, stage III CKD, CHF, OBINNA, anemia, acute on chronic cholecystitis requiring repeated drainage via IR, history of DVT, aortic valve replacement, cardiac defibrillator, presents to the ED via EMS in respiratory distress  Patient was at home, wife and daughter noticed him disoriented, struggling to breathe and \"gurgling  \"  Wife says he has been \"puffing more when he walks  \"  Daughter says the whole family was sick with COVID last week, and the patient had all the same symptoms but would not let her do an at home test   He is not on home oxygen  Daughter says that he is a sharp individual, still working as a  daily  Per EMS, when they arrived patient was satting 80s on room air  On arrival to the ED, he is wearing a CPAP, pulse ox 90%  Tachycardic to 114, tachypneic to 83N, systolic blood pressure 698X  Febrile to 101 9  Wife says she last gave him 1000 mg of Tylenol at 3 PM  Patient is working hard to breathe, though he does say he is not having chest pain  He does say he is having some abdominal tenderness, specifically in the right upper quadrant  He has a chronic nonhealing right lower extremity wound, gets wound care changes every 2 weeks  Prior to Admission Medications   Prescriptions Last Dose Informant Patient Reported? Taking? Acetaminophen (TYLENOL EXTRA STRENGTH PO)   Yes No   Sig: Take 500 mg by mouth if needed (for pain as needed)   Indications: pain as needed   B Complex-C (SUPER B COMPLEX PO)   Yes No   Sig: Take 1 capsule by mouth daily    Diclofenac Sodium (VOLTAREN) 1 %   No No   Sig: Apply 2 g topically 4 (four) times a day   Iron " Combinations (NIFEREX) TABS   Yes No   Sig: Take 1 tablet by mouth in the morning   Multiple Vitamin (MULTIVITAMINS PO)   Yes No   Sig: Take 1 tablet by mouth daily   albuterol (PROVENTIL HFA,VENTOLIN HFA) 90 mcg/act inhaler   Yes No   Sig: Inhale 2 puffs every 6 (six) hours as needed for wheezing or shortness of breath   allopurinol (ZYLOPRIM) 100 mg tablet   No No   Sig: Take 1 tablet (100 mg total) by mouth daily Do not start before November 15, 2022  atorvastatin (LIPITOR) 40 mg tablet   Yes No   Sig: Take 40 mg by mouth daily after dinner Atorvastatin Calcium 40 MG Oral Tablet Take 1 tablet daily  Refills: 0  Active    fluticasone (FLONASE) 50 mcg/act nasal spray   Yes No   Si sprays into each nostril daily as needed Shake liquid and spray   gabapentin (NEURONTIN) 100 mg capsule   Yes No   Sig: Take 100 mg by mouth daily   metFORMIN (GLUCOPHAGE) 1000 MG tablet   Yes No   Sig: Take 1,000 mg by mouth daily with dinner  Indications: Type 2 Diabetes   mirtazapine (REMERON) 7 5 MG tablet   Yes No   Sig: Take 15 mg by mouth daily at bedtime ordered by Dr Ena Austin   omeprazole (PriLOSEC) 20 mg delayed release capsule   Yes No   Sig: Omeprazole 20 MG Oral Tablet Delayed Release Take 1 tablet daily  Refills: 0  Active   polyethylene glycol (GLYCOLAX) 17 GM/SCOOP powder   Yes No   Sig: Take 17 g by mouth 2 (two) times a day   sodium chloride, PF, 0 9 %   No No   Sig: 10 mL by Intracatheter route daily Intracatheter flushing daily  May substitute prefilled syringe with normal saline 10 mL vials, 10 mL syringes, and 18 g blunt needles   Patient taking differently: 10 mL by Intracatheter route every 12 (twelve) hours Intracatheter flushing daily   May substitute prefilled syringe with normal saline 10 mL vials, 10 mL syringes, and 18 g blunt needles   sotalol (BETAPACE) 80 mg tablet   No No   Sig: Take 1 tablet (80 mg total) by mouth daily   torsemide (DEMADEX) 20 mg tablet   Yes No   Sig: Take 20 mg by mouth daily and 10 mg in PM   warfarin (COUMADIN) 2 mg tablet   No No   Sig: Take 2 tablets (4 mg total) by mouth daily Acknowledge      Facility-Administered Medications: None       Past Medical History:   Diagnosis Date   • Anemia    • Arthritis    • Atrial fibrillation ([facility] )    • Basal cell carcinoma 03/22/2022    Tip of Nose   • CHF (congestive heart failure) (HCC)    • Diabetes mellitus ([facility] )     Niddm   • DVT (deep vein thrombosis) in pregnancy 1966    not in pregnancy   • DVT (deep venous thrombosis) ([facility] ) 1966   • Dyslipidemia    • Encephalopathy acute 11/4/2022   • GERD (gastroesophageal reflux disease)    • Hyperlipidemia    • Hypertension    • Hypomagnesemia 10/19/2022   • Irregular heart beat     Afib   • Morbid obesity due to excess calories ([facility] )     Resolved 9/2/2014    • Pulmonary embolism (HCC)    • Sepsis ([facility] )    • Squamous cell skin cancer 07/30/2020    Left posterior scalp   • Visual impairment        Past Surgical History:   Procedure Laterality Date   • AORTIC VALVE REPLACEMENT     • CARDIAC DEFIBRILLATOR PLACEMENT  04/2014   • CARDIAC SURGERY  02/2014    AVR   • COLONOSCOPY     • INSERT / REPLACE / REMOVE PACEMAKER     • IR ASPIRATION BAKERS CYST  3/8/2023   • IR CHOLECYSTOSTOMY TUBE CHECK/CHANGE/REPOSITION/REINSERTION/UPSIZE  10/13/2022   • IR CHOLECYSTOSTOMY TUBE CHECK/CHANGE/REPOSITION/REINSERTION/UPSIZE  10/19/2022   • IR CHOLECYSTOSTOMY TUBE CHECK/CHANGE/REPOSITION/REINSERTION/UPSIZE  10/27/2022   • IR CHOLECYSTOSTOMY TUBE CHECK/CHANGE/REPOSITION/REINSERTION/UPSIZE  11/7/2022   • IR CHOLECYSTOSTOMY TUBE CHECK/CHANGE/REPOSITION/REINSERTION/UPSIZE  11/10/2022   • IR CHOLECYSTOSTOMY TUBE CHECK/CHANGE/REPOSITION/REINSERTION/UPSIZE  12/1/2022   • IR CHOLECYSTOSTOMY TUBE CHECK/CHANGE/REPOSITION/REINSERTION/UPSIZE  1/6/2023   • IR CHOLECYSTOSTOMY TUBE CHECK/CHANGE/REPOSITION/REINSERTION/UPSIZE  1/24/2023   • IR CHOLECYSTOSTOMY TUBE CHECK/CHANGE/REPOSITION/REINSERTION/UPSIZE  3/15/2023   • IR CHOLECYSTOSTOMY TUBE PLACEMENT  9/1/2022   • IR DRAINAGE TUBE CHECK AND/OR REMOVAL  3/22/2023   • JOINT REPLACEMENT Left     LTKR   • MOHS SURGERY  07/30/2020    Left posterior scalp, Dr Janey Patton   • [address]  04/18/2022    [facility] Tip of Nose- Dr Janey Patton   • Shagufta Popper DIVJ&STRIP LONG SAPH SAPHFEM Maria Alejandra Leavitt Right 8/17/2018    Procedure: LEG PERFORATED INJECTION SCLEROTHERAPY;  Surgeon: Obie Cody MD;  Location: [facility];  Service: Vascular   • REPLACEMENT TOTAL KNEE Right    • TOTAL KNEE ARTHROPLASTY Left    • VASCULAR SURGERY     • VENA CAVA FILTER PLACEMENT      Interruption inferior vena cava, Wayne filter, placement   • WISDOM TOOTH EXTRACTION         Family History   Problem Relation Age of Onset   • Arthritis Mother    • Stroke Mother    • Arthritis Father    • Cancer Father    • Arthritis Daughter      I have reviewed and agree with the history as documented      E-Cigarette/Vaping   • E-Cigarette Use Never User      E-Cigarette/Vaping Substances   • Nicotine No    • THC No    • CBD No    • Other No    • Unknown No      Social History     Tobacco Use   • Smoking status: Never   • Smokeless tobacco: Never   Vaping Use   • Vaping Use: Never used   Substance Use Topics   • Alcohol use: Not Currently     Comment: no alcohol in 28 yrs   • Drug use: Never        Review of Systems   Unable to perform ROS: Acuity of condition       Physical Exam  ED Triage Vitals   Temperature Pulse Respirations Blood Pressure SpO2   04/04/23 1849 04/04/23 1847 04/04/23 1847 04/04/23 1847 04/04/23 1840   (!) 101 9 °F (38 8 °C) 101 (!) 24 161/79 98 %      Temp Source Heart Rate Source Patient Position - Orthostatic VS BP Location FiO2 (%)   04/04/23 2330 04/04/23 2045 04/04/23 2045 04/04/23 2045 04/04/23 1840   Oral Monitor Lying Right arm 40      Pain Score       04/05/23 0220       No Pain             Orthostatic Vital Signs  Vitals:    04/04/23 2330 04/04/23 2345 04/05/23 0000 04/05/23 0202   BP: 103/57 107/63 96/54 105/55   Pulse: 75 82 81 74   Patient Position - Orthostatic VS: Lying  Lying Lying       Physical Exam  Vitals and nursing note reviewed  Constitutional:       General: He is in acute distress  Appearance: He is well-developed  He is ill-appearing  HENT:      Head: Normocephalic and atraumatic  Eyes:      Extraocular Movements: Extraocular movements intact  Cardiovascular:      Rate and Rhythm: Regular rhythm  Tachycardia present  Pulmonary:      Effort: Tachypnea, accessory muscle usage and respiratory distress present  Breath sounds: Examination of the right-lower field reveals decreased breath sounds  Examination of the left-lower field reveals decreased breath sounds  Decreased breath sounds and rhonchi present  No wheezing  Abdominal:      Palpations: Abdomen is soft  Tenderness: There is abdominal tenderness (RUQ)  There is no guarding or rebound  Musculoskeletal:      Cervical back: Neck supple  Right lower leg: Edema present  Left lower leg: Edema present  Skin:     General: Skin is warm and dry  Comments: Chronic venous stasis changes to bilateral lower extremities, chronic nonhealing wound to the right lower extremity  Neurological:      General: No focal deficit present  Mental Status: He is alert   He is disoriented                         ED Medications  Medications   acetaminophen (TYLENOL) rectal suppository 650 mg (0 mg Rectal Hold 4/4/23 1911)   metroNIDAZOLE (FLAGYL) IVPB (premix) 500 mg 100 mL (0 mg Intravenous Stopped 4/4/23 2039)   lactated ringers infusion (100 mL/hr Intravenous New Bag 4/5/23 0106)   albuterol (PROVENTIL HFA,VENTOLIN HFA) inhaler 2 puff (has no administration in time range)   mirtazapine (REMERON) tablet 15 mg (has no administration in time range)   insulin lispro (HumaLOG) 100 units/mL subcutaneous injection 2-12 Units (has no administration in time range)   cefepime (MAXIPIME) 2,000 mg in dextrose 5 % 50 mL IVPB (has no administration in time range)   vancomycin (VANCOCIN) 2,000 mg in sodium chloride 0 9 % 500 mL IVPB (has no administration in time range)   dexamethasone (DECADRON) injection 6 mg (has no administration in time range)   baricitinib (OLUMIANT) tablet 2 mg (has no administration in time range)   remdesivir (Veklury) 200 mg in sodium chloride 0 9 % 290 mL IVPB (has no administration in time range)     Followed by   remdesivir Blease Inessa) 100 mg in sodium chloride 0 9 % 270 mL IVPB (has no administration in time range)   ceftriaxone (ROCEPHIN) 1 g/50 mL in dextrose IVPB (0 mg Intravenous Stopped 4/4/23 2040)   magnesium sulfate 2 g/50 mL IVPB (premix) 2 g (0 g Intravenous Stopped 4/4/23 2323)   iohexol (OMNIPAQUE) 350 MG/ML injection (SINGLE-DOSE) 100 mL (100 mL Intravenous Given 4/4/23 2122)   multi-electrolyte (ISOLYTE-S PH 7 4) bolus 500 mL (0 mL Intravenous Stopped 4/5/23 0053)   phytonadione (AQUA-MEPHYTON) 10 mg/mL 10 mg in sodium chloride 0 9 % 50 mL IVPB (10 mg Intravenous New Bag 4/5/23 0117)   prothrombin complex concentrate (human) (Kcentra) 1,500 Units (1,500 Units Intravenous Given 4/5/23 0121)   lactated ringers bolus 1,000 mL (1,000 mL Intravenous New Bag 4/5/23 0126)       Diagnostic Studies  Results Reviewed     Procedure Component Value Units Date/Time    COVID/FLU/RSV [[id number]]  (Abnormal) Collected: 04/05/23 0123    Lab Status: Final result Specimen: Nares from Nose Updated: 04/05/23 0207     SARS-CoV-2 Positive     INFLUENZA A PCR Negative     INFLUENZA B PCR Negative     RSV PCR Negative    Narrative:      FOR PEDIATRIC PATIENTS - copy/paste COVID Guidelines URL to browser: [email or web address]    SARS-CoV-2 assay is a Nucleic Acid Amplification assay intended for the  qualitative detection of nucleic acid from SARS-CoV-2 in nasopharyngeal  swabs  Results are for the presumptive identification of SARS-CoV-2 RNA      Positive results are indicative of infection with SARS-CoV-2, the virus  causing COVID-19, but do not rule out bacterial infection or co-infection  with other viruses  Laboratories within the [place] and its  territories are required to report all positive results to the appropriate  public health authorities  Negative results do not preclude SARS-CoV-2  infection and should not be used as the sole basis for treatment or other  patient management decisions  Negative results must be combined with  clinical observations, patient history, and epidemiological information  This test has not been FDA cleared or approved  This test has been authorized by FDA under an Emergency Use Authorization  (EUA)  This test is only authorized for the duration of time the  declaration that circumstances exist justifying the authorization of the  emergency use of an in vitro diagnostic tests for detection of SARS-CoV-2  virus and/or diagnosis of COVID-19 infection under section 564(b)(1) of  the Act, 21 U  S C  852GDI-9(F)(6), unless the authorization is terminated  or revoked sooner  The test has been validated but independent review by FDA  and CLIA is pending  Test performed using [employer] GeneXpert: This RT-PCR assay targets N2,  a region unique to SARS-CoV-2  A conserved region in the E-gene was chosen  for pan-Sarbecovirus detection which includes SARS-CoV-2  According to CMS-2020-01-R, this platform meets the definition of high-throughput technology  HS Troponin I 4hr [[id number]]  (Abnormal) Collected: 04/04/23 2313    Lab Status: Final result Specimen: Blood from Arm, Right Updated: 04/05/23 0141     hs TnI 4hr 39 ng/L      Delta 4hr hsTnI 28 ng/L     Blood culture #1 [[id number]] Collected: 04/04/23 1856    Lab Status: Preliminary result Specimen: Blood from Arm, Left Updated: 04/05/23 0101     Blood Culture Received in Microbiology Lab  Culture in Progress      Blood culture #2 [[id number]] Collected: 04/04/23 1856    Lab Status: Preliminary result Specimen: Blood from Arm, Right Updated: 04/05/23 0101     Blood Culture Received in Microbiology Lab  Culture in Progress  Lactic acid 2 Hours [[id number]]  (Abnormal) Collected: 04/04/23 2138    Lab Status: Final result Specimen: Blood from Arm, Right Updated: 04/04/23 2238     LACTIC ACID 2 3 mmol/L     Narrative:      Result may be elevated if tourniquet was used during collection      HS Troponin I 2hr [[id number]]  (Abnormal) Collected: 04/04/23 2138    Lab Status: Final result Specimen: Blood from Arm, Right Updated: 04/04/23 2214     hs TnI 2hr 33 ng/L      Delta 2hr hsTnI 22 ng/L     UA w Reflex to Microscopic w Reflex to Culture [[id number]] Collected: 04/04/23 2152    Lab Status: Final result Specimen: Urine, Clean Catch Updated: 04/04/23 2159     Color, UA Light Yellow     Clarity, UA Clear     Specific Gravity, UA 1 010     pH, UA 5 0     Leukocytes, UA Negative     Nitrite, UA Negative     Protein, UA Negative mg/dl      Glucose, UA Negative mg/dl      Ketones, UA Negative mg/dl      Urobilinogen, UA <2 0 mg/dl      Bilirubin, UA Negative     Occult Blood, UA Negative    Magnesium [[id number]]  (Abnormal) Collected: 04/04/23 1856    Lab Status: Final result Specimen: Blood from Arm, Right Updated: 04/04/23 2022     Magnesium 1 7 mg/dL     Lipase [[id number]]  (Normal) Collected: 04/04/23 1856    Lab Status: Final result Specimen: Blood from Arm, Right Updated: 04/04/23 2022     Lipase 24 u/L     Procalcitonin [[id number]]  (Normal) Collected: 04/04/23 1856    Lab Status: Final result Specimen: Blood from Arm, Right Updated: 04/04/23 1947     Procalcitonin 0 05 ng/ml     B-Type Natriuretic Peptide(BNP) [[id number]]  (Abnormal) Collected: 04/04/23 1856    Lab Status: Final result Specimen: Blood from Arm, Right Updated: 04/04/23 1940      pg/mL     HS Troponin 0hr (reflex protocol) [[id number]]  (Normal) Collected: 04/04/23 1856    Lab Status: Final result Specimen: Blood from Arm, Right Updated: 04/04/23 1938     hs TnI 0hr 11 ng/L     Lactic acid [[id number]]  (Abnormal) Collected: 04/04/23 1856    Lab Status: Final result Specimen: Blood from Arm, Right Updated: 04/04/23 1931     LACTIC ACID 2 4 mmol/L     Narrative:      Result may be elevated if tourniquet was used during collection      Protime-INR [[id number]]  (Abnormal) Collected: 04/04/23 1856    Lab Status: Final result Specimen: Blood from Arm, Right Updated: 04/04/23 1926     Protime 45 2 seconds      INR 4 80    APTT [[id number]]  (Abnormal) Collected: 04/04/23 1856    Lab Status: Final result Specimen: Blood from Arm, Right Updated: 04/04/23 1926     PTT 53 seconds     Comprehensive metabolic panel [[id number]]  (Abnormal) Collected: 04/04/23 1856    Lab Status: Final result Specimen: Blood from Arm, Right Updated: 04/04/23 1926     Sodium 138 mmol/L      Potassium 4 4 mmol/L      Chloride 103 mmol/L      CO2 24 mmol/L      ANION GAP 11 mmol/L      BUN 20 mg/dL      Creatinine 1 46 mg/dL      Glucose 147 mg/dL      Calcium 9 2 mg/dL      AST 29 U/L      ALT 16 U/L      Alkaline Phosphatase 103 U/L      Total Protein 7 8 g/dL      Albumin 3 8 g/dL      Total Bilirubin 2 41 mg/dL      eGFR 42 ml/min/1 73sq m     Narrative:      [place] guidelines for Chronic Kidney Disease (CKD):   •  Stage 1 with normal or high GFR (GFR > 90 mL/min/1 73 square meters)  •  Stage 2 Mild CKD (GFR = 60-89 mL/min/1 73 square meters)  •  Stage 3A Moderate CKD (GFR = 45-59 mL/min/1 73 square meters)  •  Stage 3B Moderate CKD (GFR = 30-44 mL/min/1 73 square meters)  •  Stage 4 Severe CKD (GFR = 15-29 mL/min/1 73 square meters)  •  Stage 5 End Stage CKD (GFR <15 mL/min/1 73 square meters)  Note: GFR calculation is accurate only with a steady state creatinine    POCT Blood Gas (CG8+) [[id number]]  (Abnormal) Collected: 04/04/23 1905    Lab Status: Final result Specimen: Venous Updated: 04/04/23 1909     ph, Pete ISTAT 7 425     pCO2, Pete i-STAT 34 2 mm HG      pO2, Pete i-STAT 84 0 mm HG      BE, i-STAT -2 mmol/L      HCO3, Pete i-STAT 22 4 mmol/L      CO2, i-STAT 23 mmol/L      O2 Sat, i-STAT 97 %      SODIUM, I-STAT 139 mmol/l      Potassium, i-STAT 4 2 mmol/L      Calcium, Ionized i-STAT 1 17 mmol/L      Hct, i-STAT 32 %      Hgb, i-STAT 10 9 g/dl      Glucose, i-STAT 149 mg/dl      Specimen Type VENOUS    CBC and differential [[id number]]  (Abnormal) Collected: 04/04/23 1856    Lab Status: Final result Specimen: Blood from Arm, Right Updated: 04/04/23 1909     WBC 13 85 Thousand/uL      RBC 3 74 Million/uL      Hemoglobin 10 5 g/dL      Hematocrit 34 8 %      MCV 93 fL      MCH 28 1 pg      MCHC 30 2 g/dL      RDW 17 3 %      MPV 9 6 fL      Platelets 463 Thousands/uL      nRBC 0 /100 WBCs      Neutrophils Relative 87 %      Immat GRANS % 1 %      Lymphocytes Relative 7 %      Monocytes Relative 4 %      Eosinophils Relative 1 %      Basophils Relative 0 %      Neutrophils Absolute 12 03 Thousands/µL      Immature Grans Absolute 0 10 Thousand/uL      Lymphocytes Absolute 1 01 Thousands/µL      Monocytes Absolute 0 57 Thousand/µL      Eosinophils Absolute 0 09 Thousand/µL      Basophils Absolute 0 05 Thousands/µL     Blood gas, arterial [[id number]]     Lab Status: No result Specimen: Blood, Arterial                  CT chest abdomen pelvis w contrast   Final Result by Osei Fisher MD (04/04 9051)      1  Moderate right pleural effusion is slightly increased since the prior study  New trace left pleural effusion is also seen  2   New lower lobe predominant pulmonary infiltrates bilaterally worse in the left lower lobe suggesting multifocal pneumonia  3   Likely reactive enlarged mediastinal/hilar lymph nodes measuring up to 2 2 x 1 6 cm    4   Interval removal of percutaneous cholecystostomy tube    Gallbladder is contracted containing small stones demonstrated mild diffuse wall thickening, similar compared to the prior exam   Findings may indicate chronic cholecystitis  5   There is an irregular partially loculated fluid collection surrounding the cecum, ascending colon, and inferior right hepatic lobe measuring approximately 9 4 x 5 1 cm in axial dimension and 13 cm in craniocaudal dimension  This could be from bile    leak given interval cholecystostomy tube placement and removal   Alternatively, cannot exclude contained perforation of the right colon without pericolonic abscess  Clinical correlation recommended  6   No evidence for bowel obstruction, obstructive uropathy, or free air  7   Small umbilical hernia containing mesenteric fat and trace ascites noted  8   Subcutaneous stranding in the lower abdominal/pelvic wall and bilateral flank regions could be due to edema or cellulitis  Please correlate with physical exam findings/direct visualization  Other ancillary findings detailed above  Workstation performed: [id number]         XR chest 1 view portable   ED Interpretation by Sherri Joya MD (19/11 1186)   Small left-sided pneumonia  Right lower pleural effusion  Right perihilar congestion  No evidence of pneumothorax  Interpreted by me  Procedures  ECG 12 Lead Documentation Only    Date/Time: 4/4/2023 7:30 PM  Performed by: Sherri Joya MD  Authorized by:  Sherri Joya MD     Indications / Diagnosis:  Sob/respiratory distress  ECG reviewed by me, the ED Provider: yes    Patient location:  ED  Previous ECG:     Previous ECG:  Compared to current  Interpretation:     Interpretation: abnormal    Rate:     ECG rate:  97    ECG rate assessment: normal    Rhythm:     Rhythm: sinus rhythm    Ectopy:     Ectopy: PVCs      PVCs:  Frequent  QRS:     QRS axis:  Indeterminate    QRS intervals:  Normal  Conduction:     Conduction: abnormal      Abnormal conduction: complete RBBB    ST segments:     ST segments:  Non-specific  T waves:     T waves: normal    Other findings:     Other findings: prolonged qTc interval    Comments:      QTc 523          ED Course  ED Course as of 04/05/23 0236   Tue Apr 04, 2023 1852 Respirations(!): 24   1852 Pulse: 101   1852 Temperature(!): 101 9 °F (38 8 °C)  Holding tylenol at this time as wife at bedside tells me she gave him 1,000mg at 3pm   1852 RT at bedside, on bipap 18/10     1933 POCT INR(!): 4 80  On warfarin  Supratheraputic    1934 WBC(!): 13 85   1937 ABG 7 42/34/84/22, demonstrates mixed respiratory and metabolic alkalosis   4354 BNP(!): 493   1944 Creatinine(!): 1 46  Baseline 1 - 1 2   1948 Meets severe sepsis criteria for WBC 13 85, Lactic 2 4, , RR 30s, and on bipap -- HDS with pressures 161/79, not in septic shock, not giving 30cc/kg bolus at this time, will start on abx -- ceftriaxone (respiratory) and flagyl (on-going biliary infection with some RUQ tenderness here)   2105 Magnesium(!): 1 7  Replacing with 2g   2106 hs TnI 0hr: 11   2106 Lipase: 24  wnl   2106 On re-evaluation, patient remains HDS  SBP 140s, HR 88, SpO2 98% on bipap 18/10 at 40%   2237 Blood Pressure: 90/57  Giving 500cc bolus (hx of CHF)   2300 CT A/P IMPRESSION:    1  Moderate right pleural effusion is slightly increased since the prior study  New trace left pleural effusion is also seen  2   New lower lobe predominant pulmonary infiltrates bilaterally worse in the left lower lobe suggesting multifocal pneumonia  3   Likely reactive enlarged mediastinal/hilar lymph nodes measuring up to 2 2 x 1 6 cm   4   Interval removal of percutaneous cholecystostomy tube  Gallbladder is contracted containing small stones demonstrated mild diffuse wall thickening, similar compared to the prior exam   Findings may indicate chronic cholecystitis  5   There is an irregular partially loculated fluid collection surrounding the cecum, ascending colon, and inferior right hepatic lobe measuring approximately 9 4 x 5 1 cm in axial dimension and 13 cm in craniocaudal dimension    This could be from "bile   leak given interval cholecystostomy tube placement and removal   Alternatively, cannot exclude contained perforation of the right colon without pericolonic abscess  Clinical correlation recommended  6   No evidence for bowel obstruction, obstructive uropathy, or free air  7   Small umbilical hernia containing mesenteric fat and trace ascites noted  8   Subcutaneous stranding in the lower abdominal/pelvic wall and bilateral flank regions could be due to edema or cellulitis  Please correlate with physical exam findings/direct visualization  Other ancillary findings detailed above  2335 Consult to general surgery to rule out acute surgical intervention   Wed Apr 05, 2023   0043 Admitted to SICU under Dr Jimena Garsia Name 04/04/23 1943                Is the patient's history suggestive of a new or worsening infection? Yes (Proceed)  -MR        Suspected source of infection suspect infection, source unknown  -MR        Indicate SIRS criteria Hyperthemia > 38 3C (100 9F) OR Hypothermia <36C (96 8F); Tachycardia > 90 bpm;Tachypnea > 20 resp per min;Leukocytosis (WBC > 27825 IJL) OR Leukopenia (WBC <4000 IJL) OR Bandemia (WBC >10% bands)  -MR        Are two or more of the above signs & symptoms of infection both present and new to the patient? Yes (Proceed)  -MR        Assess for evidence of organ dysfunction: Are any of the below criteria present within 6 hours of suspected infection and SIRS criteria that are NOT considered to be chronic conditions? Lactate > 2  0;New need for invasive/non-invasive ventilation  -MR        Date of presentation of severe sepsis 04/04/23  -MR        Time of presentation of severe sepsis 1945  -MR        Sepsis Note: Click \"NEXT\" below (NOT \"close\") to generate sepsis note based on above information   YES (proceed by clicking \"NEXT\")  -MR              User Key  (r) = Recorded By, (t) = Taken By, (c) = Cosigned By    Initials Name " Provider Type    MR Kendra Figueroa MD Resident                          Medical Decision Making  41-year-old male with history of A-fib on warfarin, peripheral vascular disease, type 2 diabetes complicated by chronic nonhealing foot wound, stage III CKD, CHF, OBINNA, anemia, acute on chronic cholecystitis requiring repeated drainage, history of DVT, aortic valve replacement, cardiac defibrillator, presents to the ED via EMS in respiratory distress  Differential diagnosis includes but is not limited to: Flash pulmonary edema, ACS, MI, PE, pneumothorax, pleural effusion, pneumonia, anemia    On arrival to the ED, patient with significant work of breathing, tachypnea to 30s, satting 90% on CPAP with EMS  RT at bedside, immediately transferred to BiPAP 18/10 at 40% FiO2, O2 sat 98%, febrile to 101 9, last Tylenol administration was 4-hour hours prior to arrival  Initial pressure with systolic 637L, heart rate 110s  Patient is awake and alert  Denies chest pain, does endorse shortness of breath and abdominal pain  He has diffuse rhonchi bilaterally, decreased breath sounds in the bases bilaterally  Abdominal tenderness in the right upper quadrant, where there is a well-healing cholecystostomy draining site  He has chronic venous stasis changes to bilateral lower extremities, with a large, nonhealing right lower extremity wound  Laboratory work-up notable for: Lactic acid 2 4, white count to 13 85, hemoglobin 10 5 (baseline ~9), REMA with creatinine 1 46 (baseline 0 99)  ABG 7 42/34/84/22    Chest x-ray suggestive of left lower lobe pneumonia and right lower lobe pleural effusion, with some right-sided perihilar congestion  Do not appreciate significant pulmonary edema  No evidence of pneumothorax  Patient meets severe sepsis criteria, started on ceftriaxone for suspected pneumonia, as well as Flagyl for possible intra-abdominal infection    After several hours, patient does begin to have lower pressures, with systolics in the 64T  He is given small fluid bolus given his history of CHF and current respiratory distress requiring BiPAP  He responds well to fluid administration with systolics in the 702F  CT imaging demonstrates large irregular partially loculated fluid collection around the inferior right hepatic lobe and cecum, possibly representing bile leak versus abscess  Reached out to general surgery resident Dr Baylee Johnson, who determines the patient will be admitted to the surgical ICU for continued workup and treatment  Amount and/or Complexity of Data Reviewed  Labs: ordered  Decision-making details documented in ED Course  Radiology: ordered and independent interpretation performed  Risk  OTC drugs  Prescription drug management  Decision regarding hospitalization              Disposition  Final diagnoses:   Severe sepsis ([facility] )   Acute respiratory failure (HCC)   Pneumonia   Acute on chronic cholecystitis   Pleural effusion   Hypomagnesemia   Lactic acidosis   CHF (congestive heart failure) ([facility])   Non-healing wound of right lower extremity   REMA (acute kidney injury) ([place] [address] )   Abdominal pain     Time reflects when diagnosis was documented in both MDM as applicable and the Disposition within this note     Time User Action Codes Description Comment    4/4/2023 11:35 PM Darryl Quant Add [A41 9,  R65 20] Severe sepsis ([facility] )     4/5/2023 12:25 AM Baudette Quant Add [J96 00] Acute respiratory failure ([facility] )     4/5/2023 12:25 AM Darryl Quant Add [J18 9] Pneumonia     4/5/2023 12:26 AM Darryl Quant Add [K81 2] Acute on chronic cholecystitis     4/5/2023 12:26 AM Darryl Quant Add [J90] Pleural effusion     4/5/2023 12:27 AM Baudette Quant Add [E83 42] Hypomagnesemia     4/5/2023 12:27 AM Darryl Quant Add [E87 20] Lactic acidosis     4/5/2023 12:28 AM Baudette Quant Add [I50 9] CHF (congestive heart failure) ([facility] )     4/5/2023 12:29 AM Darryl Quant Add [S81 801A] Non-healing wound of right lower extremity     4/5/2023 12:30 AM Deena Fallon Add [N17 9] REMA (acute kidney injury) ([facility] )     4/5/2023 12:30 AM Deena Fallon Add [R10 9] Abdominal pain     4/5/2023  2:09 AM Vanessa URIBE Add [J18 9] Pneumonia of both lungs due to infectious organism, unspecified part of lung       ED Disposition     ED Disposition   Admit    Condition   Stable    Date/Time   Wed Apr 5, 2023 12:30 AM    Comment   Case was discussed with Dr Shavonne Romo and the patient's admission status was agreed to be Admission Status: inpatient status to the service of Dr Macey Coronel   Follow-up Information    None         Current Discharge Medication List      CONTINUE these medications which have NOT CHANGED    Details   Acetaminophen (TYLENOL EXTRA STRENGTH PO) Take 500 mg by mouth if needed (for pain as needed)  Indications: pain as needed      albuterol (PROVENTIL HFA,VENTOLIN HFA) 90 mcg/act inhaler Inhale 2 puffs every 6 (six) hours as needed for wheezing or shortness of breath    Comments: Substitution to a formulary equivalent within the same pharmaceutical class is authorized  allopurinol (ZYLOPRIM) 100 mg tablet Take 1 tablet (100 mg total) by mouth daily Do not start before November 15, 2022      Associated Diagnoses: Gout, unspecified cause, unspecified chronicity, unspecified site      atorvastatin (LIPITOR) 40 mg tablet Take 40 mg by mouth daily after dinner Atorvastatin Calcium 40 MG Oral Tablet Take 1 tablet daily  Refills: 0  Active       B Complex-C (SUPER B COMPLEX PO) Take 1 capsule by mouth daily       Diclofenac Sodium (VOLTAREN) 1 % Apply 2 g topically 4 (four) times a day    Associated Diagnoses: Pain in both lower extremities      fluticasone (FLONASE) 50 mcg/act nasal spray 2 sprays into each nostril daily as needed Shake liquid and spray      gabapentin (NEURONTIN) 100 mg capsule Take 100 mg by mouth daily      Iron Combinations (NIFEREX) TABS Take 1 tablet by mouth in the morning  Refills: 5      metFORMIN (GLUCOPHAGE) 1000 MG tablet Take 1,000 mg by mouth daily with dinner  Indications: Type 2 Diabetes      mirtazapine (REMERON) 7 5 MG tablet Take 15 mg by mouth daily at bedtime ordered by Dr Forte Morning      Multiple Vitamin (MULTIVITAMINS PO) Take 1 tablet by mouth daily      omeprazole (PriLOSEC) 20 mg delayed release capsule Omeprazole 20 MG Oral Tablet Delayed Release Take 1 tablet daily  Refills: 0  Active      polyethylene glycol (GLYCOLAX) 17 GM/SCOOP powder Take 17 g by mouth 2 (two) times a day      sodium chloride, PF, 0 9 % 10 mL by Intracatheter route daily Intracatheter flushing daily  May substitute prefilled syringe with normal saline 10 mL vials, 10 mL syringes, and 18 g blunt needles  Qty: 300 mL, Refills: 2    Associated Diagnoses: Cholecystostomy care ([facility] )      sotalol (BETAPACE) 80 mg tablet Take 1 tablet (80 mg total) by mouth daily  Refills: 0    Associated Diagnoses: Acute cholecystitis      torsemide (DEMADEX) 20 mg tablet Take 20 mg by mouth daily and 10 mg in PM      warfarin (COUMADIN) 2 mg tablet Take 2 tablets (4 mg total) by mouth daily Acknowledge  Qty: 14 tablet, Refills: 0    Associated Diagnoses: PAF (paroxysmal atrial fibrillation) (HCC)           No discharge procedures on file  PDMP Review     None           ED Provider  Attending physically available and evaluated Teri Gatito WELLS managed the patient along with the ED Attending      Electronically Signed by         Sherri Joya MD  04/05/23 3544

## 2023-04-05 ENCOUNTER — APPOINTMENT (INPATIENT)
Dept: RADIOLOGY | Facility: HOSPITAL | Age: 88
End: 2023-04-05

## 2023-04-05 PROBLEM — E78.2 MIXED HYPERLIPIDEMIA: Chronic | Status: ACTIVE | Noted: 2021-03-29

## 2023-04-05 PROBLEM — N40.0 BPH (BENIGN PROSTATIC HYPERPLASIA): Chronic | Status: ACTIVE | Noted: 2017-04-20

## 2023-04-05 PROBLEM — R53.81 PHYSICAL DECONDITIONING: Status: RESOLVED | Noted: 2022-11-21 | Resolved: 2023-04-05

## 2023-04-05 PROBLEM — N18.30 CKD (CHRONIC KIDNEY DISEASE) STAGE 3, GFR 30-59 ML/MIN (HCC): Chronic | Status: ACTIVE | Noted: 2022-05-07

## 2023-04-05 PROBLEM — J96.01 ACUTE HYPOXEMIC RESPIRATORY FAILURE (HCC): Status: ACTIVE | Noted: 2023-04-05

## 2023-04-05 PROBLEM — S32.040A COMPRESSION FRACTURE OF L4 LUMBAR VERTEBRA: Status: RESOLVED | Noted: 2017-04-24 | Resolved: 2023-04-05

## 2023-04-05 PROBLEM — Z95.810 PRESENCE OF IMPLANTABLE CARDIOVERTER-DEFIBRILLATOR (ICD): Chronic | Status: ACTIVE | Noted: 2018-03-22

## 2023-04-05 PROBLEM — K66.8: Status: ACTIVE | Noted: 2023-04-05

## 2023-04-05 PROBLEM — G47.01 INSOMNIA DUE TO MEDICAL CONDITION: Status: RESOLVED | Noted: 2022-11-21 | Resolved: 2023-04-05

## 2023-04-05 PROBLEM — Z79.01 CHRONIC ANTICOAGULATION: Chronic | Status: ACTIVE | Noted: 2017-04-04

## 2023-04-05 PROBLEM — I82.509 CHRONIC DEEP VEIN THROMBOSIS (DVT) (HCC): Chronic | Status: ACTIVE | Noted: 2020-09-10

## 2023-04-05 PROBLEM — N45.1 EPIDIDYMITIS: Status: RESOLVED | Noted: 2017-04-02 | Resolved: 2023-04-05

## 2023-04-05 PROBLEM — J18.9 PNEUMONIA OF BOTH LUNGS DUE TO INFECTIOUS ORGANISM: Status: ACTIVE | Noted: 2023-04-05

## 2023-04-05 PROBLEM — U07.1 COVID-19: Status: ACTIVE | Noted: 2023-04-05

## 2023-04-05 PROBLEM — N49.2 CELLULITIS OF SCROTUM: Status: RESOLVED | Noted: 2017-04-02 | Resolved: 2023-04-05

## 2023-04-05 PROBLEM — J90 PLEURAL EFFUSION ON RIGHT: Status: ACTIVE | Noted: 2023-04-05

## 2023-04-05 PROBLEM — M79.606 LEG PAIN: Status: RESOLVED | Noted: 2022-11-07 | Resolved: 2023-04-05

## 2023-04-05 LAB
4HR DELTA HS TROPONIN: 28 NG/L
ALBUMIN SERPL BCP-MCNC: 3 G/DL (ref 3.5–5)
ALP SERPL-CCNC: 66 U/L (ref 34–104)
ALT SERPL W P-5'-P-CCNC: 11 U/L (ref 7–52)
ANION GAP SERPL CALCULATED.3IONS-SCNC: 9 MMOL/L (ref 4–13)
AST SERPL W P-5'-P-CCNC: 22 U/L (ref 13–39)
BASOPHILS # BLD AUTO: 0.04 THOUSANDS/ÂΜL (ref 0–0.1)
BASOPHILS NFR BLD AUTO: 0 % (ref 0–1)
BILIRUB SERPL-MCNC: 2.31 MG/DL (ref 0.2–1)
BUN SERPL-MCNC: 22 MG/DL (ref 5–25)
CALCIUM ALBUM COR SERPL-MCNC: 9.1 MG/DL (ref 8.3–10.1)
CALCIUM SERPL-MCNC: 8.3 MG/DL (ref 8.4–10.2)
CARDIAC TROPONIN I PNL SERPL HS: 39 NG/L
CHLORIDE SERPL-SCNC: 106 MMOL/L (ref 96–108)
CO2 SERPL-SCNC: 23 MMOL/L (ref 21–32)
CREAT SERPL-MCNC: 1.39 MG/DL (ref 0.6–1.3)
CRP SERPL QL: 40.2 MG/L
D DIMER PPP FEU-MCNC: 2.3 UG/ML FEU
EOSINOPHIL # BLD AUTO: 0.02 THOUSAND/ÂΜL (ref 0–0.61)
EOSINOPHIL NFR BLD AUTO: 0 % (ref 0–6)
ERYTHROCYTE [DISTWIDTH] IN BLOOD BY AUTOMATED COUNT: 17.4 % (ref 11.6–15.1)
FLUAV RNA RESP QL NAA+PROBE: NEGATIVE
FLUBV RNA RESP QL NAA+PROBE: NEGATIVE
GFR SERPL CREATININE-BSD FRML MDRD: 45 ML/MIN/1.73SQ M
GLUCOSE SERPL-MCNC: 101 MG/DL (ref 65–140)
GLUCOSE SERPL-MCNC: 106 MG/DL (ref 65–140)
GLUCOSE SERPL-MCNC: 109 MG/DL (ref 65–140)
GLUCOSE SERPL-MCNC: 143 MG/DL (ref 65–140)
GLUCOSE SERPL-MCNC: 153 MG/DL (ref 65–140)
HCT VFR BLD AUTO: 27.9 % (ref 36.5–49.3)
HGB BLD-MCNC: 8.5 G/DL (ref 12–17)
IMM GRANULOCYTES # BLD AUTO: 0.16 THOUSAND/UL (ref 0–0.2)
IMM GRANULOCYTES NFR BLD AUTO: 1 % (ref 0–2)
INR PPP: 1.6 (ref 0.84–1.19)
INR PPP: 1.93 (ref 0.84–1.19)
INR PPP: 2.1 (ref 0.84–1.19)
LACTATE SERPL-SCNC: 1.6 MMOL/L (ref 0.5–2)
LYMPHOCYTES # BLD AUTO: 1.15 THOUSANDS/ÂΜL (ref 0.6–4.47)
LYMPHOCYTES NFR BLD AUTO: 5 % (ref 14–44)
MAGNESIUM SERPL-MCNC: 2 MG/DL (ref 1.9–2.7)
MCH RBC QN AUTO: 28.3 PG (ref 26.8–34.3)
MCHC RBC AUTO-ENTMCNC: 30.5 G/DL (ref 31.4–37.4)
MCV RBC AUTO: 93 FL (ref 82–98)
MONOCYTES # BLD AUTO: 1.27 THOUSAND/ÂΜL (ref 0.17–1.22)
MONOCYTES NFR BLD AUTO: 6 % (ref 4–12)
NEUTROPHILS # BLD AUTO: 19.02 THOUSANDS/ÂΜL (ref 1.85–7.62)
NEUTS SEG NFR BLD AUTO: 88 % (ref 43–75)
NRBC BLD AUTO-RTO: 0 /100 WBCS
PHOSPHATE SERPL-MCNC: 3.6 MG/DL (ref 2.3–4.1)
PLATELET # BLD AUTO: 234 THOUSANDS/UL (ref 149–390)
PMV BLD AUTO: 9.5 FL (ref 8.9–12.7)
POTASSIUM SERPL-SCNC: 4.4 MMOL/L (ref 3.5–5.3)
PROT SERPL-MCNC: 5.9 G/DL (ref 6.4–8.4)
PROTHROMBIN TIME: 19.3 SECONDS (ref 11.6–14.5)
PROTHROMBIN TIME: 22.3 SECONDS (ref 11.6–14.5)
PROTHROMBIN TIME: 23.8 SECONDS (ref 11.6–14.5)
RBC # BLD AUTO: 3 MILLION/UL (ref 3.88–5.62)
RSV RNA RESP QL NAA+PROBE: NEGATIVE
SARS-COV-2 RNA RESP QL NAA+PROBE: POSITIVE
SODIUM SERPL-SCNC: 138 MMOL/L (ref 135–147)
WBC # BLD AUTO: 21.66 THOUSAND/UL (ref 4.31–10.16)

## 2023-04-05 RX ORDER — DEXAMETHASONE SODIUM PHOSPHATE 4 MG/ML
6 INJECTION, SOLUTION INTRA-ARTICULAR; INTRALESIONAL; INTRAMUSCULAR; INTRAVENOUS; SOFT TISSUE EVERY 24 HOURS
Status: DISCONTINUED | OUTPATIENT
Start: 2023-04-05 | End: 2023-04-07

## 2023-04-05 RX ORDER — HEPARIN SODIUM 5000 [USP'U]/ML
5000 INJECTION, SOLUTION INTRAVENOUS; SUBCUTANEOUS EVERY 8 HOURS SCHEDULED
Status: DISCONTINUED | OUTPATIENT
Start: 2023-04-05 | End: 2023-04-05

## 2023-04-05 RX ORDER — FAMOTIDINE 10 MG/ML
20 INJECTION, SOLUTION INTRAVENOUS EVERY 12 HOURS SCHEDULED
Status: DISCONTINUED | OUTPATIENT
Start: 2023-04-05 | End: 2023-04-07 | Stop reason: HOSPADM

## 2023-04-05 RX ORDER — MIRTAZAPINE 15 MG/1
15 TABLET, FILM COATED ORAL
Status: DISCONTINUED | OUTPATIENT
Start: 2023-04-05 | End: 2023-04-07 | Stop reason: HOSPADM

## 2023-04-05 RX ORDER — AMOXICILLIN 250 MG
1 CAPSULE ORAL
Status: DISCONTINUED | OUTPATIENT
Start: 2023-04-05 | End: 2023-04-07 | Stop reason: HOSPADM

## 2023-04-05 RX ORDER — ALBUTEROL SULFATE 90 UG/1
2 AEROSOL, METERED RESPIRATORY (INHALATION) EVERY 6 HOURS PRN
Status: DISCONTINUED | OUTPATIENT
Start: 2023-04-05 | End: 2023-04-07 | Stop reason: HOSPADM

## 2023-04-05 RX ORDER — INSULIN LISPRO 100 [IU]/ML
2-12 INJECTION, SOLUTION INTRAVENOUS; SUBCUTANEOUS EVERY 6 HOURS SCHEDULED
Status: DISCONTINUED | OUTPATIENT
Start: 2023-04-05 | End: 2023-04-06

## 2023-04-05 RX ORDER — NYSTATIN 100000 [USP'U]/G
POWDER TOPICAL 2 TIMES DAILY
Status: DISCONTINUED | OUTPATIENT
Start: 2023-04-05 | End: 2023-04-07 | Stop reason: HOSPADM

## 2023-04-05 RX ORDER — BENZONATATE 100 MG/1
100 CAPSULE ORAL 3 TIMES DAILY PRN
Status: DISCONTINUED | OUTPATIENT
Start: 2023-04-05 | End: 2023-04-07 | Stop reason: HOSPADM

## 2023-04-05 RX ORDER — CHLORHEXIDINE GLUCONATE 0.12 MG/ML
15 RINSE ORAL EVERY 12 HOURS SCHEDULED
Status: DISCONTINUED | OUTPATIENT
Start: 2023-04-05 | End: 2023-04-05

## 2023-04-05 RX ORDER — SOTALOL HYDROCHLORIDE 80 MG/1
80 TABLET ORAL DAILY
Status: DISCONTINUED | OUTPATIENT
Start: 2023-04-05 | End: 2023-04-07 | Stop reason: HOSPADM

## 2023-04-05 RX ORDER — HEPARIN SODIUM 5000 [USP'U]/ML
5000 INJECTION, SOLUTION INTRAVENOUS; SUBCUTANEOUS EVERY 8 HOURS SCHEDULED
Status: DISCONTINUED | OUTPATIENT
Start: 2023-04-05 | End: 2023-04-07 | Stop reason: HOSPADM

## 2023-04-05 RX ORDER — SODIUM CHLORIDE, SODIUM LACTATE, POTASSIUM CHLORIDE, CALCIUM CHLORIDE 600; 310; 30; 20 MG/100ML; MG/100ML; MG/100ML; MG/100ML
75 INJECTION, SOLUTION INTRAVENOUS CONTINUOUS
Status: DISCONTINUED | OUTPATIENT
Start: 2023-04-05 | End: 2023-04-05

## 2023-04-05 RX ORDER — VANCOMYCIN HYDROCHLORIDE 1 G/200ML
10 INJECTION, SOLUTION INTRAVENOUS EVERY 24 HOURS
Status: DISCONTINUED | OUTPATIENT
Start: 2023-04-06 | End: 2023-04-07

## 2023-04-05 RX ADMIN — Medication 1500 UNITS: at 01:21

## 2023-04-05 RX ADMIN — MIRTAZAPINE 15 MG: 15 TABLET, FILM COATED ORAL at 21:18

## 2023-04-05 RX ADMIN — PHYTONADIONE 5 MG: 10 INJECTION, EMULSION INTRAMUSCULAR; INTRAVENOUS; SUBCUTANEOUS at 15:29

## 2023-04-05 RX ADMIN — METRONIDAZOLE 500 MG: 500 INJECTION, SOLUTION INTRAVENOUS at 11:59

## 2023-04-05 RX ADMIN — NYSTATIN: 100000 POWDER TOPICAL at 14:13

## 2023-04-05 RX ADMIN — PHYTONADIONE 10 MG: 10 INJECTION, EMULSION INTRAMUSCULAR; INTRAVENOUS; SUBCUTANEOUS at 01:17

## 2023-04-05 RX ADMIN — VANCOMYCIN HYDROCHLORIDE 2000 MG: 1 INJECTION, POWDER, LYOPHILIZED, FOR SOLUTION INTRAVENOUS at 02:55

## 2023-04-05 RX ADMIN — SODIUM CHLORIDE, SODIUM LACTATE, POTASSIUM CHLORIDE, AND CALCIUM CHLORIDE 1000 ML: .6; .31; .03; .02 INJECTION, SOLUTION INTRAVENOUS at 01:26

## 2023-04-05 RX ADMIN — NYSTATIN: 100000 POWDER TOPICAL at 17:22

## 2023-04-05 RX ADMIN — REMDESIVIR 200 MG: 100 INJECTION, POWDER, LYOPHILIZED, FOR SOLUTION INTRAVENOUS at 03:53

## 2023-04-05 RX ADMIN — Medication 1500 UNITS: at 14:11

## 2023-04-05 RX ADMIN — HEPARIN SODIUM 5000 UNITS: 5000 INJECTION INTRAVENOUS; SUBCUTANEOUS at 21:18

## 2023-04-05 RX ADMIN — FAMOTIDINE 20 MG: 10 INJECTION, SOLUTION INTRAVENOUS at 10:47

## 2023-04-05 RX ADMIN — BARICITINIB 2 MG: 2 TABLET, FILM COATED ORAL at 03:54

## 2023-04-05 RX ADMIN — DEXAMETHASONE SODIUM PHOSPHATE 6 MG: 4 INJECTION INTRA-ARTICULAR; INTRALESIONAL; INTRAMUSCULAR; INTRAVENOUS; SOFT TISSUE at 03:53

## 2023-04-05 RX ADMIN — MIRTAZAPINE 15 MG: 15 TABLET, FILM COATED ORAL at 02:55

## 2023-04-05 RX ADMIN — INSULIN LISPRO 2 UNITS: 100 INJECTION, SOLUTION INTRAVENOUS; SUBCUTANEOUS at 17:24

## 2023-04-05 RX ADMIN — METRONIDAZOLE 500 MG: 500 INJECTION, SOLUTION INTRAVENOUS at 20:28

## 2023-04-05 RX ADMIN — CEFEPIME 2000 MG: 2 INJECTION, POWDER, FOR SOLUTION INTRAVENOUS at 10:46

## 2023-04-05 RX ADMIN — SOTALOL HYDROCHLORIDE 80 MG: 80 TABLET ORAL at 10:46

## 2023-04-05 RX ADMIN — BENZONATATE 100 MG: 100 CAPSULE ORAL at 21:44

## 2023-04-05 RX ADMIN — FAMOTIDINE 20 MG: 10 INJECTION, SOLUTION INTRAVENOUS at 21:18

## 2023-04-05 RX ADMIN — SENNOSIDES AND DOCUSATE SODIUM 1 TABLET: 8.6; 5 TABLET ORAL at 21:18

## 2023-04-05 RX ADMIN — CEFEPIME 2000 MG: 2 INJECTION, POWDER, FOR SOLUTION INTRAVENOUS at 20:28

## 2023-04-05 RX ADMIN — METRONIDAZOLE 500 MG: 500 INJECTION, SOLUTION INTRAVENOUS at 05:16

## 2023-04-05 RX ADMIN — SODIUM CHLORIDE, SODIUM LACTATE, POTASSIUM CHLORIDE, AND CALCIUM CHLORIDE 100 ML/HR: .6; .31; .03; .02 INJECTION, SOLUTION INTRAVENOUS at 01:06

## 2023-04-05 NOTE — ASSESSMENT & PLAN NOTE
· As seen on CT chest 4/4- significant infiltrate vs GGO LLE  · Weaned off bipap and placed on 6L NC- keep sat > 88%  · Given Ceftriaxone and Azithro in ED- recent admission 3/8 will change over to Cefepime and Vanco  · May be secondary to Covid- start Moderate pathway  · Sputum culture if able to obtain specimen  · Encourage I/S  · Keep [facility] elevated

## 2023-04-05 NOTE — ASSESSMENT & PLAN NOTE
· Recent family illness, daughter reports pt had negative test today prior to coming in  · Start moderate Covid pathway- start Decadron 6 mg, Remdesivir, Baricitinib  · Encourage self proning if able  · I/S while awake  · Wean O2 to keep sat > 88%

## 2023-04-05 NOTE — PROGRESS NOTES
"Progress Note -general surgery  Ena Morning 81 y o  male MRN: [id number]  Unit/Bed#: ICU 02 Encounter: [id number]    Assessment:  81 y o  male With history of cholecystitis requiring multiple percutaneous cholecystostomy tube, now presents with bilateral multifocal pneumonia secondary to COVID, and large abdominal fluid collection suspected to be a potential bile leak  Patient has improved overnight  He is now off of BiPAP on nasal cannula  His abdominal exam remains benign  He received [address] overnight for reversal of his INR to 4 8, now 2 1 this morning  Lactic acidosis is cleared  Plan:  - Diet NPO; Sips with meds  - Pain and Nausea control PRN  - Incentive spirometry  -Respiratory protocol  -Plan for IR drainage of abdominal fluid collection when appropriate  - Continue antibiotics for pneumonia  - Follow-up blood cultures    Subjective/Objective     Subjective: Patient feeling better since admission  Feels as though he is able to take torres breaths  Still denies any abdominal pain  Objective:   Vitals: Blood pressure 117/54, pulse 69, temperature 99 4 °F (37 4 °C), temperature source Axillary, resp  rate 19, height 6' 1\" (1 854 m), weight (!) 145 kg (319 lb 14 2 oz), SpO2 98 %  ,Body mass index is 42 2 kg/m²  I/O       04/03 0701  04/04 0700 04/04 0701  04/05 0700    I V  (mL/kg)  491 7 (3 4)    IV Piggyback  2140    Total Intake(mL/kg)  2631 7 (18 1)    Net  +2631 7                Physical Exam:  Gen: NAD, Aox3, Comfortable in bed  Chest: Normal work of breathing, no respiratory distress  2 L nasal cannula  Abd: Soft, ND, NT  Ext: No Edema  Skin: Warm, Dry, Intact      Lab, Imaging and other studies:   I have personally reviewed pertinent reports    , CBC with diff:   Lab Results   Component Value Date    WBC 13 85 (H) 04/04/2023    HGB 10 9 (L) 04/04/2023    HCT 32 (L) 04/04/2023    MCV 93 04/04/2023     04/04/2023    MCH 28 1 04/04/2023    MCHC 30 2 (L) 04/04/2023    RDW 17 3 (H) " 04/04/2023    MPV 9 6 04/04/2023    NRBC 0 04/04/2023   , BMP/CMP:   Lab Results   Component Value Date    SODIUM 138 04/05/2023    K 4 4 04/05/2023     04/05/2023    CO2 23 04/05/2023    CO2 23 04/04/2023    BUN 22 04/05/2023    CREATININE 1 39 (H) 04/05/2023    GLUCOSE 149 (H) 04/04/2023    CALCIUM 8 3 (L) 04/05/2023    AST 22 04/05/2023    ALT 11 04/05/2023    ALKPHOS 66 04/05/2023    EGFR 45 04/05/2023   , Coags:   Lab Results   Component Value Date    PTT 53 (H) 04/04/2023    INR 2 10 (H) 04/05/2023     VTE Pharmacologic Prophylaxis: Sequential compression device (Venodyne)   VTE Mechanical Prophylaxis: sequential compression device        Shavon Joy MD  4/5/2023  6:38 AM

## 2023-04-05 NOTE — QUICK NOTE
-Case discussed with IR on call (Dr Teja Park) regarding abdominal fluid collection drainage plans   -States INR will need to be <1 5 before drainage is done  Today, pt's INR: 2 1    -Recheck INR in AM and if below 1 5, aspiration vs drain placement can be performed     -I requested for IR to obtain fluid cultures, cell count and differential when drainage occurs for possible antibiotic changes

## 2023-04-05 NOTE — CONSULTS
Interventional Radiology  Consultation 4/5/2023     Inpatient Consult to IR  Consult performed by: Robert Santoyo MD  Consult ordered by: MD Caroline Goldman   1935   [phone]      Assessment/Plan:  81year old male with COVID pneumonia  CT done shows enlarging right upper quadrant fluid collection post jeny tube removal  INR elevated at 2 1  Plan to correct INR then aspirate vs drain placement, possibly today pending INR  Repeat INR is 1 9  For abdominal abscess drainage INR should be 1 5 or less per national guidelines  Patient evaluated at bedside, HR normal, BP normal, breathing on room air  Lactic acid now normal  Patient denies any abdominal pain  Plan for drain placement once INR corrected  If the patient's clinical picture worsens discussed with ICU team to reach out to IR      Medical Problems     Problem List     * (Principal) Pneumonia of both lungs due to infectious organism    Obesity, unspecified obesity severity, unspecified obesity type    Type 2 diabetes mellitus with diabetic neuropathy, without long-term current use of insulin ([facility]) (Chronic)    Lab Results   Component Value Date    HGBA1C 6 3 (H) 03/01/2022       Recent Labs     04/05/23  0259 04/05/23  0612 04/05/23  1152   POCGLU 109 106 143*       Blood Sugar Average: Last 72 hrs:  (P) 532 [id number]        Hydrocele    Chronic anticoagulation (Chronic)    Anemia    Paroxysmal atrial fibrillation (HCC) (Chronic)    S/P AVR    PVD (peripheral vascular disease) ([facility])    Thrombophlebitis    Presence of implantable cardioverter-defibrillator (ICD) (Chronic)    Edema    Venous ulcer of ankle, right ([facility])    Peripheral venous insufficiency    Ventricular tachycardia    Secondary lymphedema    CKD (chronic kidney disease) stage 3, GFR 30-59 ml/min ([facility]) (Chronic)    Lab Results   Component Value Date    EGFR 45 04/05/2023    EGFR 42 04/04/2023    EGFR 72 11/14/2022    CREATININE 1 39 (H) 04/05/2023 CREATININE 1 46 (H) 04/04/2023    CREATININE 0 95 11/14/2022         Chronic diastolic CHF (congestive heart failure) (HCC) (Chronic)    Wt Readings from Last 3 Encounters:   04/05/23 (!) 145 kg (319 lb 14 2 oz)   03/08/23 (!) 137 kg (301 lb)   01/23/23 (!) 139 kg (307 lb)                 OBINNA (obstructive sleep apnea) (Chronic)    Benign prostatic hyperplasia with lower urinary tract symptoms    Acute cholecystitis    Acute kidney injury ([facility] )    Cardiomegaly    Chronic bilateral low back pain without sciatica    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    Dyslipidemia    Endocarditis    Mixed hyperlipidemia (Chronic)    Osteoarthritis, knee    Renal cyst    Status post right knee replacement    Swelling of limb    BPH (benign prostatic hyperplasia) (Chronic)    Chronic deep vein thrombosis (DVT) (HCC) (Chronic)    Acute on chronic cholecystitis    Cholecystostomy care ([facility] )    Gout    Calculus of gallbladder (Chronic)    Extrahepatic biloma    Acute hypoxemic respiratory failure (HCC)    COVID-19    Pleural effusion on right            Subjective:     Patient ID: Danial Kussmaul is a 81 y o  male  History of Present Illness  81year old male with COVID pneumonia  CT done shows enlarging right upper quadrant fluid collection post jeny  Tube removal  INR elevated at 2 1  Plan to correct INR then aspirate vs drain placement, possibly today pending INR        Review of Systems  as above    Past Medical History:   Diagnosis Date   • Anemia    • Arthritis    • Atrial fibrillation ([facility] )    • Basal cell carcinoma 03/22/2022    Tip of Nose   • CHF (congestive heart failure) (HCC)    • Diabetes mellitus ([facility] )     Niddm   • DVT (deep vein thrombosis) in pregnancy 1966    not in pregnancy   • DVT (deep venous thrombosis) ([facility] ) 1966   • Dyslipidemia    • Encephalopathy acute 11/4/2022   • GERD (gastroesophageal reflux disease)    • Hyperlipidemia    • Hypertension    • Hypomagnesemia 10/19/2022   • Irregular heart beat     Afib   • Morbid obesity due to excess calories ([facility] )     Resolved 9/2/2014    • Pulmonary embolism (HCC)    • Sepsis ([facility] )    • Squamous cell skin cancer 07/30/2020    Left posterior scalp   • Visual impairment         Past Surgical History:   Procedure Laterality Date   • AORTIC VALVE REPLACEMENT     • CARDIAC DEFIBRILLATOR PLACEMENT  04/2014   • CARDIAC SURGERY  02/2014    AVR   • COLONOSCOPY     • INSERT / REPLACE / REMOVE PACEMAKER     • IR ASPIRATION BAKERS CYST  3/8/2023   • IR CHOLECYSTOSTOMY TUBE CHECK/CHANGE/REPOSITION/REINSERTION/UPSIZE  10/13/2022   • IR CHOLECYSTOSTOMY TUBE CHECK/CHANGE/REPOSITION/REINSERTION/UPSIZE  10/19/2022   • IR CHOLECYSTOSTOMY TUBE CHECK/CHANGE/REPOSITION/REINSERTION/UPSIZE  10/27/2022   • IR CHOLECYSTOSTOMY TUBE CHECK/CHANGE/REPOSITION/REINSERTION/UPSIZE  11/7/2022   • IR CHOLECYSTOSTOMY TUBE CHECK/CHANGE/REPOSITION/REINSERTION/UPSIZE  11/10/2022   • IR CHOLECYSTOSTOMY TUBE CHECK/CHANGE/REPOSITION/REINSERTION/UPSIZE  12/1/2022   • IR CHOLECYSTOSTOMY TUBE CHECK/CHANGE/REPOSITION/REINSERTION/UPSIZE  1/6/2023   • IR CHOLECYSTOSTOMY TUBE CHECK/CHANGE/REPOSITION/REINSERTION/UPSIZE  1/24/2023   • IR CHOLECYSTOSTOMY TUBE CHECK/CHANGE/REPOSITION/REINSERTION/UPSIZE  3/15/2023   • IR CHOLECYSTOSTOMY TUBE PLACEMENT  9/1/2022   • IR DRAINAGE TUBE CHECK AND/OR REMOVAL  3/22/2023   • JOINT REPLACEMENT Left     LTKR   • MOHS SURGERY  07/30/2020    Left posterior scalp, Dr Sami Blanchard   • MOHS SURGERY  04/18/2022    BCC Tip of Nose- Dr Sami Blanchard   • Cherrie Bugler DIVJ&STRIP LONG SAPH Camilo Macleod Right 8/17/2018    Procedure: LEG PERFORATED INJECTION SCLEROTHERAPY;  Surgeon: Kelvin Reed MD;  Location: [facility]  MAIN OR;  Service: Vascular   • REPLACEMENT TOTAL KNEE Right    • TOTAL KNEE ARTHROPLASTY Left    • VASCULAR SURGERY     • VENA CAVA FILTER PLACEMENT      Interruption inferior vena cava, Craig filter, placement   • WISDOM TOOTH EXTRACTION          Social History     Tobacco Use   Smoking Status Never   Smokeless Tobacco Never        Social History     Substance and Sexual Activity   Alcohol Use Not Currently    Comment: no alcohol in 28 yrs        Social History     Substance and Sexual Activity   Drug Use Never        Allergies   Allergen Reactions   • Tramadol Other (See Comments)     intolerance       Current Facility-Administered Medications   Medication Dose Route Frequency Provider Last Rate Last Admin   • acetaminophen (TYLENOL) rectal suppository 650 mg  650 mg Rectal Once Sherri Joya MD       • albuterol (PROVENTIL HFA,VENTOLIN HFA) inhaler 2 puff  2 puff Inhalation Q6H PRN Aura Klinefelter, MD       • baricitinib (OLUMIANT) tablet 2 mg  2 mg Oral Q24H Mace Bon, CRNP   2 mg at 04/05/23 0354   • cefepime (MAXIPIME) 2,000 mg in dextrose 5 % 50 mL IVPB  2,000 mg Intravenous Q12H Mace Bon, CRNP 100 mL/hr at 04/05/23 1046 2,000 mg at 04/05/23 1046   • dexamethasone (DECADRON) injection 6 mg  6 mg Intravenous Q24H Mace Bon, CRNP   6 mg at 04/05/23 0353   • Famotidine (PF) (PEPCID) injection 20 mg  20 mg Intravenous Q12H [address] JORI CallowayNP   20 mg at 04/05/23 1047   • heparin (porcine) subcutaneous injection 5,000 Units  5,000 Units Subcutaneous Q8H [address] Virginia Llanos MD       • insulin lispro (HumaLOG) 100 units/mL subcutaneous injection 2-12 Units  2-12 Units Subcutaneous Q6H [address] Aura Klinefelter, MD       • lactated ringers infusion  75 mL/hr Intravenous Continuous Sydnee Evans MD 75 mL/hr at 04/05/23 1047 75 mL/hr at 04/05/23 1047   • metroNIDAZOLE (FLAGYL) IVPB (premix) 500 mg 100 mL  500 mg Intravenous Q8H Sherri Joya  mL/hr at 04/05/23 1159 500 mg at 04/05/23 1159   • mirtazapine (REMERON) tablet 15 mg  15 mg Oral HS Aura Klinefelter, MD   15 mg at 04/05/23 0255   • prothrombin complex concentrate (human) (Kcentra) 1,500 Units  1,500 Units Intravenous Once Hipolito Weinberg MD       • [START ON 4/6/2023] remdesivir (Veklury) 100 mg in sodium chloride 0 9 % 270 mL IVPB  100 mg Intravenous Q24H ODESSA Prince       • senna-docusate sodium (SENOKOT S) 8 6-50 mg per tablet 1 tablet  1 tablet Oral HS ODESSA Fung       • sotalol (BETAPACE) tablet 80 mg  80 mg Oral Daily ODESSA Toro   80 mg at 04/05/23 1046   • [START ON 4/6/2023] vancomycin (VANCOCIN) IVPB (premix in dextrose) 1,000 mg 200 mL  10 mg/kg (Adjusted) Intravenous Q24H Neelam Danelle         Facility-Administered Medications Ordered in Other Encounters   Medication Dose Route Frequency Provider Last Rate Last Admin   • cefTRIAXone (ROCEPHIN) 2,000 mg in dextrose 5 % 50 mL IVPB  2,000 mg Intravenous Once Rodrigo Arriola MD       • sodium chloride 0 9 % infusion  75 mL/hr Intravenous Continuous Rodrigo Arriola MD 75 mL/hr at 03/08/23 1000 75 mL/hr at 03/08/23 1000          Objective:    Vitals:    04/05/23 0500 04/05/23 0600 04/05/23 0700 04/05/23 1100   BP: 108/51 117/54 117/56 136/69   BP Location:   Right arm Right arm   Pulse: 71 69 72 71   Resp: 18 19 (!) 26 21   Temp:   (!) 97 4 °F (36 3 °C) (!) 97 4 °F (36 3 °C)   TempSrc:   Oral Oral   SpO2: 96% 98% 98% 93%   Weight:       Height:            Physical Exam  Not performed    Lab Results   Component Value Date     (H) 04/04/2023      Lab Results   Component Value Date    WBC 21 66 (H) 04/05/2023    HGB 8 5 (L) 04/05/2023    HCT 27 9 (L) 04/05/2023    MCV 93 04/05/2023     04/05/2023     Lab Results   Component Value Date    INR 2 10 (H) 04/05/2023    INR 4 80 (H) 04/04/2023    INR 1 34 (H) 03/08/2023    PROTIME 23 8 (H) 04/05/2023    PROTIME 45 2 (H) 04/04/2023    PROTIME 16 8 (H) 03/08/2023     Lab Results   Component Value Date    PTT 53 (H) 04/04/2023         I have personally reviewed pertinent imaging and laboratory results       Code Status: Level 1 - Full Code  Advance Directive and Living Will:      Power of : POLST:      IR has been consulted to evaluate the patient, determine the appropriate procedure, and whether or not a procedure can and should be performed  Thank you for allowing me to participate in the care of Phoebe Nicole  Please don't hesitate to call, text, email, or TigerText with any questions  This text is generated with voice recognition software  There may be translation, syntax,  or grammatical errors  If you have any questions, please contact the dictating provider

## 2023-04-05 NOTE — PLAN OF CARE
Problem: PAIN - ADULT  Goal: Verbalizes/displays adequate comfort level or baseline comfort level  Description: Interventions:  - Encourage patient to monitor pain and request assistance  - Assess pain using appropriate pain scale  - Administer analgesics based on type and severity of pain and evaluate response  - Implement non-pharmacological measures as appropriate and evaluate response  - Consider cultural and social influences on pain and pain management  - Notify physician/advanced practitioner if interventions unsuccessful or patient reports new pain  Outcome: Progressing     Problem: INFECTION - ADULT  Goal: Absence or prevention of progression during hospitalization  Description: INTERVENTIONS:  - Assess and monitor for signs and symptoms of infection  - Monitor lab/diagnostic results  - Monitor all insertion sites, i e  indwelling lines, tubes, and drains  - Monitor endotracheal if appropriate and nasal secretions for changes in amount and color  - [place] appropriate cooling/warming therapies per order  - Administer medications as ordered  - Instruct and encourage patient and family to use good hand hygiene technique  - Identify and instruct in appropriate isolation precautions for identified infection/condition  Outcome: Progressing  Goal: Absence of fever/infection during neutropenic period  Description: INTERVENTIONS:  - Monitor WBC    Outcome: Progressing     Problem: SAFETY ADULT  Goal: Patient will remain free of falls  Description: INTERVENTIONS:  - Educate patient/family on patient safety including physical limitations  - Instruct patient to call for assistance with activity   - Consult OT/PT to assist with strengthening/mobility   - Keep Call bell within reach  - Keep bed low and locked with side rails adjusted as appropriate  - Keep care items and personal belongings within reach  - Initiate and maintain comfort rounds  - Make Fall Risk Sign visible to staff  - Offer Toileting every 2 Hours, in advance of need  - Initiate/Maintain all alarm  - Obtain necessary fall risk management equipment: bed alarm  - Apply yellow socks and bracelet for high fall risk patients  - Consider moving patient to room near nurses station  Outcome: Progressing  Goal: Maintain or return to baseline ADL function  Description: INTERVENTIONS:  -  Assess patient's ability to carry out ADLs; assess patient's baseline for ADL function and identify physical deficits which impact ability to perform ADLs (bathing, care of mouth/teeth, toileting, grooming, dressing, etc )  - Assess/evaluate cause of self-care deficits   - Assess range of motion  - Assess patient's mobility; develop plan if impaired  - Assess patient's need for assistive devices and provide as appropriate  - Encourage maximum independence but intervene and supervise when necessary  - Involve family in performance of ADLs  - Assess for home care needs following discharge   - Consider OT consult to assist with ADL evaluation and planning for discharge  - Provide patient education as appropriate  Outcome: Progressing  Goal: Maintains/Returns to pre admission functional level  Description: INTERVENTIONS:  - Perform BMAT or MOVE assessment daily    - Set and communicate daily mobility goal to care team and patient/family/caregiver  - Collaborate with rehabilitation services on mobility goals if consulted  - Perform Range of Motion 4 times a day  - Reposition patient every 2 hours    - Dangle patient 1 times a day  - Stand patient 1 times a day  - Ambulate patient 1 times a day  - Out of bed to chair 1 times a day   - Out of bed for meals 1 times a day  - Out of bed for toileting  - Record patient progress and toleration of activity level   Outcome: Progressing     Problem: DISCHARGE PLANNING  Goal: Discharge to home or other facility with appropriate resources  Description: INTERVENTIONS:  - Identify barriers to discharge w/patient and caregiver  - Arrange for needed discharge resources and transportation as appropriate  - Identify discharge learning needs (meds, wound care, etc )  - Arrange for interpretive services to assist at discharge as needed  - Refer to Case Management Department for coordinating discharge planning if the patient needs post-hospital services based on physician/advanced practitioner order or complex needs related to functional status, cognitive ability, or social support system  Outcome: Progressing     Problem: Knowledge Deficit  Goal: Patient/family/caregiver demonstrates understanding of disease process, treatment plan, medications, and discharge instructions  Description: Complete learning assessment and assess knowledge base  Interventions:  - Provide teaching at level of understanding  - Provide teaching via preferred learning methods  Outcome: Progressing     Problem: Nutrition/Hydration-ADULT  Goal: Nutrient/Hydration intake appropriate for improving, restoring or maintaining nutritional needs  Description: Monitor and assess patient's nutrition/hydration status for malnutrition  Collaborate with interdisciplinary team and initiate plan and interventions as ordered  Monitor patient's weight and dietary intake as ordered or per policy  Utilize nutrition screening tool and intervene as necessary  Determine patient's food preferences and provide high-protein, high-caloric foods as appropriate       INTERVENTIONS:  - Monitor oral intake, urinary output, labs, and treatment plans  - Assess nutrition and hydration status and recommend course of action  - Evaluate amount of meals eaten  - Assist patient with eating if necessary   - Allow adequate time for meals  - Recommend/ encourage appropriate diets, oral nutritional supplements, and vitamin/mineral supplements  - Order, calculate, and assess calorie counts as needed  - Recommend, monitor, and adjust tube feedings and TPN/PPN based on assessed needs  - Assess need for intravenous fluids  - Provide specific nutrition/hydration education as appropriate  - Include patient/family/caregiver in decisions related to nutrition  Outcome: Progressing     Problem: MOBILITY - ADULT  Goal: Maintain or return to baseline ADL function  Description: INTERVENTIONS:  -  Assess patient's ability to carry out ADLs; assess patient's baseline for ADL function and identify physical deficits which impact ability to perform ADLs (bathing, care of mouth/teeth, toileting, grooming, dressing, etc )  - Assess/evaluate cause of self-care deficits   - Assess range of motion  - Assess patient's mobility; develop plan if impaired  - Assess patient's need for assistive devices and provide as appropriate  - Encourage maximum independence but intervene and supervise when necessary  - Involve family in performance of ADLs  - Assess for home care needs following discharge   - Consider OT consult to assist with ADL evaluation and planning for discharge  - Provide patient education as appropriate  Outcome: Progressing  Goal: Maintains/Returns to pre admission functional level  Description: INTERVENTIONS:  - Perform BMAT or MOVE assessment daily    - Set and communicate daily mobility goal to care team and patient/family/caregiver  - Collaborate with rehabilitation services on mobility goals if consulted  - Perform Range of Motion 4times a day  - Reposition patient every 2 hours    - Dangle patient 1 times a day  - Stand patient 1 times a day  - Ambulate patient 1 times a day  - Out of bed to chair 1 times a day   - Out of bed for meals 1 times a day  - Out of bed for toileting  - Record patient progress and toleration of activity level   Outcome: Progressing

## 2023-04-05 NOTE — ASSESSMENT & PLAN NOTE
· Covid vs PNA  · Weaned off bipap in ED, placed on 6L NC wean for goal sat > 88%  · Bipap as needed  · Started moderate Covid pathway

## 2023-04-05 NOTE — ASSESSMENT & PLAN NOTE
Lab Results   Component Value Date    HGBA1C 6 3 (H) 03/01/2022       No results for input(s): POCGLU in the last 72 hours      Blood Sugar Average: Last 72 hrs:     · Start sliding scale insulin coverage

## 2023-04-05 NOTE — ASSESSMENT & PLAN NOTE
Wt Readings from Last 3 Encounters:   04/05/23 (!) 145 kg (319 lb 14 2 oz)   03/08/23 (!) 137 kg (301 lb)   01/23/23 (!) 139 kg (307 lb)     · Monitor intake and output closely  · Avoid any aggressive fluids at this time  · Monitor daily weights

## 2023-04-05 NOTE — ASSESSMENT & PLAN NOTE
· Recent perc jeny tube removed 2 weeks ago  · Large collection on CT 9 x 5 x 13 cm  · Pt denies any c/o abd pain at present but wife reports he c/o abd pain earlier today  · Biloma vs possible abscess  · Hold anticoagulation as pt will need drainage today  · Unable to palpate collection at this time  · Reverse current INR w/ [address] and Vit K

## 2023-04-05 NOTE — CONSULTS
[facility]  Consult  Name: Bjorn Molina  MRN: [id number]  Unit/Bed#: ICU 02 I Date of Admission: 4/4/2023   Date of Service: 4/5/2023 I Hospital Day: 0    Consults    Assessment/Plan   * Pneumonia of both lungs due to infectious organism  Assessment & Plan  · As seen on CT chest 4/4- significant infiltrate vs GGO LLE  · Weaned off bipap and placed on 6L NC- keep sat > 88%  · Given Ceftriaxone and Azithro in ED- recent admission 3/8 will change over to Cefepime and Vanco  · May be secondary to Covid- start Moderate pathway  · Sputum culture if able to obtain specimen  · Encourage I/S  · Keep [facility] elevated    Acute hypoxemic respiratory failure ([facility] [address] )  Assessment & Plan  · Covid vs PNA  · Weaned off bipap in ED, placed on 6L NC wean for goal sat > 88%  · Bipap as needed  · Started moderate Covid pathway    COVID-19  Assessment & Plan  · Recent family illness, daughter reports pt had negative test today prior to coming in  · Start moderate Covid pathway- start Decadron 6 mg, Remdesivir, Baricitinib  · Encourage self proning if able  · I/S while awake  · Wean O2 to keep sat > 88%    Extrahepatic biloma  Assessment & Plan  · Recent perc jeny tube removed 2 weeks ago  · Large collection on CT 9 x 5 x 13 cm  · Pt denies any c/o abd pain at present but wife reports he c/o abd pain earlier today  · Biloma vs possible abscess  · Hold anticoagulation as pt will need drainage today  · Unable to palpate collection at this time  · Reverse current INR w/ [address] and Vit K    Pleural effusion on right  Assessment & Plan  · Amador snot appear large enough to drain at this time  · CTM and if resp status not improving would consider thoracentesis  · Supportive care at this time    Calculus of gallbladder  Assessment & Plan  · Recent perc jeny placed and then removed 2 weeks ago  · No reported issues since removal  · Wife reports pt had abd pain today but pt currently denies pain  · Still with visible stones on CT scan    Chronic diastolic CHF (congestive heart failure) (HCC)  Assessment & Plan  Wt Readings from Last 3 Encounters:   04/05/23 (!) 145 kg (319 lb 14 2 oz)   03/08/23 (!) 137 kg (301 lb)   01/23/23 (!) 139 kg (307 lb)     · Monitor intake and output closely  · Avoid any aggressive fluids at this time  · Monitor daily weights        OBINNA (obstructive sleep apnea)  Assessment & Plan  · Reports he does not wear CPAP at home  · Supportive care    Paroxysmal atrial fibrillation (HCC)  Assessment & Plan  · Hold Coumadin for now  · Appears to be in regular rhythm at present  · Rate controlled    Chronic anticoagulation  Assessment & Plan  · Hold Coumadin until further notice  · Reverse affects w/ K-centra  · Recheck INR in am  · Resume when cleared from all procedures    Type 2 diabetes mellitus with diabetic neuropathy, without long-term current use of insulin ([facility] )  Assessment & Plan  Lab Results   Component Value Date    HGBA1C 6 3 (H) 03/01/2022       No results for input(s): POCGLU in the last 72 hours  Blood Sugar Average: Last 72 hrs:     · Start sliding scale insulin coverage    Chronic deep vein thrombosis (DVT) ([facility] [address] )  Assessment & Plan  · Hold Coumadin for now  · Resume when cleared after procedures    Mixed hyperlipidemia  Assessment & Plan  · Resume home Lipitor    CKD (chronic kidney disease) stage 3, GFR 30-59 ml/min [facility])  Assessment & Plan  Lab Results   Component Value Date    EGFR 42 04/04/2023    EGFR 72 11/14/2022    EGFR 68 11/13/2022    CREATININE 1 46 (H) 04/04/2023    CREATININE 0 95 11/14/2022    CREATININE 0 99 11/13/2022     · Current 1 46, last check 0 95 Nov of 2022  · Avoid nephrotoxic meds         History of Present Illness     HPI: Corwin Queen is a 81 y o  w/ pmhx of HTN, chronic diastolic and systolic hear failure, paroxysmal A-fib, DM2, chronic DVT on Coumadin, CKD stage III, HLD, OBINNA who presents with respiratory distress    Patient was recently admitted on March 8 with acute cholecystitis and had a PERC Pam tube placed  Patient reports the catheter was removed 2 weeks ago  Since tube removed patient denies any complaints of abdominal pain until this morning  Had vomit x1 at home  Wife and daughter noticed patient to be disoriented with severe respiratory distress  Reports that family at home has had [place]  On arrival to the ED patient was wearing CPAP with pulse ox of 90%, , RR 30, febrile 101 9  History obtained from chart review and the patient  Review of Systems   Constitutional: Positive for fatigue and fever  HENT: Negative  Eyes: Negative  Respiratory: Positive for cough and shortness of breath  Cardiovascular: Negative  Gastrointestinal: Negative  Genitourinary: Negative  Musculoskeletal: Negative  Skin: Negative  Neurological: Negative  Psychiatric/Behavioral: Negative  All other systems reviewed and are negative  Historical Information   Past Medical History:  No date: Anemia  No date: Arthritis  No date: Atrial fibrillation ([place] [address] )  03/22/2022: Basal cell carcinoma      Comment:  Tip of Nose  No date: CHF (congestive heart failure) ([facility])  No date: Diabetes mellitus ([place] [address] )      Comment:  Niddm  1966: DVT (deep vein thrombosis) in pregnancy      Comment:  not in pregnancy  1966: DVT (deep venous thrombosis) ([place] [address] )  No date: Dyslipidemia  11/4/2022: Encephalopathy acute  No date: GERD (gastroesophageal reflux disease)  No date: Hyperlipidemia  No date: Hypertension  10/19/2022: Hypomagnesemia  No date: Irregular heart beat      Comment:  Afib  No date:  Morbid obesity due to excess calories ([facility] )      Comment:  Resolved 9/2/2014   No date: Pulmonary embolism ([facility] )  No date: Sepsis ([place] [address] )  07/30/2020: Squamous cell skin cancer      Comment:  Left posterior scalp  No date: Visual impairment Past Surgical History:  No date: AORTIC VALVE REPLACEMENT  04/2014: CARDIAC DEFIBRILLATOR PLACEMENT  02/2014: CARDIAC SURGERY Comment:  AVR  No date: COLONOSCOPY  No date: INSERT / REPLACE / REMOVE PACEMAKER  3/8/2023: IR ASPIRATION BAKERS CYST  10/13/2022: IR CHOLECYSTOSTOMY TUBE CHECK/CHANGE/REPOSITION/  REINSERTION/UPSIZE  10/19/2022: IR CHOLECYSTOSTOMY TUBE CHECK/CHANGE/REPOSITION/  REINSERTION/UPSIZE  10/27/2022: IR CHOLECYSTOSTOMY TUBE CHECK/CHANGE/REPOSITION/  REINSERTION/UPSIZE  11/7/2022: IR CHOLECYSTOSTOMY TUBE CHECK/CHANGE/REPOSITION/  REINSERTION/UPSIZE  11/10/2022: IR CHOLECYSTOSTOMY TUBE CHECK/CHANGE/REPOSITION/  REINSERTION/UPSIZE  12/1/2022: IR CHOLECYSTOSTOMY TUBE CHECK/CHANGE/REPOSITION/  REINSERTION/UPSIZE  1/6/2023: IR CHOLECYSTOSTOMY TUBE CHECK/CHANGE/REPOSITION/REINSERTION/  UPSIZE  1/24/2023: IR CHOLECYSTOSTOMY TUBE CHECK/CHANGE/REPOSITION/  REINSERTION/UPSIZE  3/15/2023: IR CHOLECYSTOSTOMY TUBE CHECK/CHANGE/REPOSITION/  REINSERTION/UPSIZE  9/1/2022: IR CHOLECYSTOSTOMY TUBE PLACEMENT  3/22/2023: IR DRAINAGE TUBE CHECK AND/OR REMOVAL  No date: JOINT REPLACEMENT; Left      Comment:  LTKR  07/30/2020: MOHS SURGERY      Comment:  Left posterior scalp, Dr Daina Aguirre  04/18/2022: MOHS SURGERY      Comment:  BCC Tip of Nose- Dr Daina Aguirre  8/17/2018: Taylor Lute DIVJ&STRIP LONG SAPH SAPHFEM Trenia Fees; Right      Comment:  Procedure: LEG PERFORATED INJECTION SCLEROTHERAPY;                 Surgeon: Daniela Haque MD;  Location: [facility]  MAIN OR;                 Service: Vascular  No date: REPLACEMENT TOTAL KNEE; Right  No date: TOTAL KNEE ARTHROPLASTY;  Left  No date: VASCULAR SURGERY  No date: VENA CAVA FILTER PLACEMENT      Comment:  Interruption inferior vena cava, Josue filter,                placement  No date: WISDOM TOOTH EXTRACTION   Current Outpatient Medications   Medication Instructions   • Acetaminophen (TYLENOL EXTRA STRENGTH PO) 500 mg, Oral, As needed   • albuterol (PROVENTIL HFA,VENTOLIN HFA) 90 mcg/act inhaler 2 puffs, Inhalation, Every 6 hours PRN   • allopurinol (ZYLOPRIM) 100 mg, Oral, Daily   • atorvastatin (LIPITOR) 40 mg, Oral, Daily after dinner, Atorvastatin Calcium 40 MG Oral Tablet Take 1 tablet daily  Refills: 0  Active   • B Complex-C (SUPER B COMPLEX PO) 1 capsule, Oral, Daily   • Diclofenac Sodium (VOLTAREN) 2 g, Topical, 4 times daily   • fluticasone (FLONASE) 50 mcg/act nasal spray 2 sprays, Nasal, Daily PRN, Shake liquid and spray    • gabapentin (NEURONTIN) 100 mg, Oral, Daily   • Iron Combinations (NIFEREX) TABS 1 tablet, Oral, Daily   • metFORMIN (GLUCOPHAGE) 1,000 mg, Oral, Daily with dinner   • mirtazapine (REMERON) 15 mg, Oral, Daily at bedtime, ordered by Dr Forte Morning   • Multiple Vitamin (MULTIVITAMINS PO) 1 tablet, Oral, Daily   • omeprazole (PriLOSEC) 20 mg delayed release capsule Omeprazole 20 MG Oral Tablet Delayed Release Take 1 tablet daily  Refills: 0  Active   • polyethylene glycol (GLYCOLAX) 17 g, Oral, 2 times daily   • sodium chloride, PF, 0 9 % 10 mL, Intracatheter, Daily, Intracatheter flushing daily   May substitute prefilled syringe with normal saline 10 mL vials, 10 mL syringes, and 18 g blunt needles   • sotalol (BETAPACE) 80 mg, Oral, Daily   • torsemide (DEMADEX) 20 mg, Oral, Daily, and 10 mg in PM   • warfarin (COUMADIN) 4 mg, Oral, Daily (warfarin), Acknowledge    Allergies   Allergen Reactions   • Tramadol Other (See Comments)     intolerance      Social History     Tobacco Use   • Smoking status: Never   • Smokeless tobacco: Never   Vaping Use   • Vaping Use: Never used   Substance Use Topics   • Alcohol use: Not Currently     Comment: no alcohol in 28 yrs   • Drug use: Never    Family History   Problem Relation Age of Onset   • Arthritis Mother    • Stroke Mother    • Arthritis Father    • Cancer Father    • Arthritis Daughter         Objective                            Vitals I/O      Most Recent Min/Max in 24hrs   Temp 100 °F (37 8 °C) Temp  Min: 97 7 °F (36 5 °C)  Max: 101 9 °F (38 8 °C)   Pulse 74 Pulse  Min: 74  Max: 101   Resp 20 Resp  Min: 20  Max: 26   /55 BP  Min: 90/57  Max: 161/79   O2 Sat 98 % SpO2  Min: 95 %  Max: 99 %      Intake/Output Summary (Last 24 hours) at 4/5/2023 0305  Last data filed at 4/5/2023 0053  Gross per 24 hour   Intake 200 ml   Output --   Net 200 ml         Diet NPO; Sips with meds     Invasive Monitoring Physical exam    Physical Exam  Vitals and nursing note reviewed  Constitutional:       General: He is awake  He is not in acute distress  Appearance: Normal appearance  He is well-developed  He is morbidly obese  He is ill-appearing  Interventions: Face mask in place  Comments: Transitioned to NC O2 during exam   HENT:      Head: Normocephalic and atraumatic  Eyes:      Extraocular Movements: Extraocular movements intact  Pupils: Pupils are equal, round, and reactive to light  Cardiovascular:      Rate and Rhythm: Normal rate and regular rhythm  Pulses: Normal pulses  Pulmonary:      Effort: Pulmonary effort is normal  Tachypnea present  No respiratory distress  Breath sounds: Examination of the right-middle field reveals decreased breath sounds  Examination of the left-middle field reveals decreased breath sounds  Examination of the right-lower field reveals rales  Examination of the left-lower field reveals rales  Decreased breath sounds and rales present  Abdominal:      General: Bowel sounds are normal  There is no distension  Palpations: Abdomen is soft  There is no mass  Tenderness: There is no abdominal tenderness  Musculoskeletal:         General: Normal range of motion  Right lower leg: Edema present  Left lower leg: Edema present  Skin:     General: Skin is warm and dry  Capillary Refill: Capillary refill takes less than 2 seconds  Comments: Open wound RLE, covered w/ DSD; Chronic venous stasis color changes   Neurological:      General: No focal deficit present  Mental Status: He is alert and oriented to person, place, and time   Mental status is at baseline  Psychiatric:         Mood and Affect: Mood normal          Behavior: Behavior normal  Behavior is cooperative  Thought Content: Thought content normal             Diagnostic Studies      EKG: First degree block  Imaging: I have personally reviewed pertinent reports     and I have personally reviewed pertinent films in PACS     Medications:  Scheduled PRN   acetaminophen, 650 mg, Once  baricitinib, 2 mg, Q24H  cefepime, 2,000 mg, Q12H  dexamethasone, 6 mg, Q24H  insulin lispro, 2-12 Units, Q6H [facility]  metroNIDAZOLE, 500 mg, Q8H  mirtazapine, 15 mg, HS  remdesivir, 200 mg, Q24H   Followed by  Miriam Mad ON 4/6/2023] remdesivir, 100 mg, Q24H  vancomycin, 2,000 mg, Once      albuterol, 2 puff, Q6H PRN       Continuous    lactated ringers, 100 mL/hr, Last Rate: 100 mL/hr (04/05/23 0106)         Labs:    CBC    Recent Labs     04/04/23 1856 04/04/23  1905   WBC 13 85*  --    HGB 10 5* 10 9*   HCT 34 8* 32*     --      BMP    Recent Labs     04/04/23  1856 04/04/23  1905   SODIUM 138  --    K 4 4  --      --    CO2 24 23   AGAP 11  --    BUN 20  --    CREATININE 1 46*  --    CALCIUM 9 2  --        Coags    Recent Labs     04/04/23  1856   INR 4 80*   PTT 53*        Additional Electrolytes  Recent Labs     04/04/23  1856 04/04/23  1905   MG 1 7*  --    CAIONIZED  --  1 17          Blood Gas    No recent results  No recent results LFTs  Recent Labs     04/04/23 1856   ALT 16   AST 29   ALKPHOS 103   ALB 3 8   TBILI 2 41*       Infectious  Recent Labs     04/04/23 1856   PROCALCITONI 0 05     Glucose  Recent Labs     04/04/23 1856   GLUC 150 [facility]

## 2023-04-05 NOTE — PROGRESS NOTES
0210: Patient arrived to ICU from [facility]-ED  Patient is alert and oriented  Endorses increased WOB and inspiratory wheezes  6L NC in place  Lung sounds diminished  Abdomen rounded and nondistended; pt denies pain  Wounds noted per LDA  Wound consult placed  Call bell within reach  0400: Patient unable to provide sputum culture  RN attempted to deep suction patient using trap; unsuccessful  Patient aware to call RN if sample becomes available

## 2023-04-05 NOTE — ASSESSMENT & PLAN NOTE
· Recent perc jeny placed and then removed 2 weeks ago  · No reported issues since removal  · Wife reports pt had abd pain today but pt currently denies pain  · Still with visible stones on CT scan

## 2023-04-05 NOTE — ASSESSMENT & PLAN NOTE
· Amador snot appear large enough to drain at this time  · CTM and if resp status not improving would consider thoracentesis  · Supportive care at this time

## 2023-04-05 NOTE — ASSESSMENT & PLAN NOTE
Lab Results   Component Value Date    EGFR 42 04/04/2023    EGFR 72 11/14/2022    EGFR 68 11/13/2022    CREATININE 1 46 (H) 04/04/2023    CREATININE 0 95 11/14/2022    CREATININE 0 99 11/13/2022     · Current 1 46, last check 0 95 Nov of 2022  · Avoid nephrotoxic meds

## 2023-04-05 NOTE — H&P
General surgery  History and Physical  Mirella Cheng 81 y o  male MRN: [id number]  Unit/Bed#: ED-21 Encounter: [id number]    Assessment and Plan:  82yo M with multiple comorbidities, presents with sepsis likely secondary from a superimposed bacterial pneumonia following a COVID infection, as well as a right abdominal fluid collection believed to be bile versus right colonic perforation on CT scan  Fluid collection measures 9 4 x 5 1 x 13 cm but in the absence of any abdominal pain  Patient's unstable presentation and leukocytosis however is more likely from his bilateral multifocal pneumonia  He is currently requiring BiPAP on 35 % FiO2     Patient warrants close observation given his high risks of decompensating with minimal reserve     -Admit to stepdown 1 under surgical critical care  - Wean BiPAP as tolerated  - Pain and nausea control  - Serial abdominal exams  - Trend lactic acid  - IV antibiotics for treatment of pneumonia  - IR consultation for drainage of intra-abdominal fluid collection      History of Present Illness     HPI:  Mirella Cheng is a 81 y o  male with history of A-fib on warfarin, CHF, DVT/PE, Bioprosthetic AV replacement for endocarditis, ICD for VT, PVD, DM, chronic nonhealing foot wound, CKD III, OBINNA, anemia, acute on chronic cholecystitis s/p multiple perc jeny tubes,  perc gallstones extraction and lithotripsy, biliary colonic fistula, who presents with respiratory distress  Patient presents to the ER with worsening TATE, which has grown worse over the past 1 to 2 weeks, reaching its peak today  Patient had associated symptoms of a persistent cough, nausea and vomiting, and at least 1 week of dark black stools  Per patient and family, the entire family had COVID within the last 1 to 2 weeks, however despite having all the symptoms, the patient did not test himself  The family describes him as being disoriented with difficulty speaking in between breaths   In addition patient has a long history of chronic cholecystitis requiring nonoperative management given his multiple comorbidities  Patient had his last IR procedure on 3/22, which included percutaneous extraction of stones, as well as lithotripsy and removal of his cholecystostomy tube  On presentation today he is febrile to 101 9 and hypotensive to the 90s /50s, and tachypneic requiring the use of BiPAP with settings of 18/10 on 35% FiO2  His abdomen is soft on exam with minimal LLQ tenderness  CT scan shows bilateral pleural effusions with multifocal bilateral pneumonia  Scan also shows a contracted gallbladder with chronic cholecystitis with partially loculated fluid around the right colon and right hepatic lobe measuring 9 4 x 5 x 13 cm  Fluid collection on CT concerning for a possible bile leak, while being unable to exclude a right colonic perforation  Review of Systems   All other systems reviewed and are negative         Historical Information   Past Medical History:   Diagnosis Date   • Anemia    • Arthritis    • Atrial fibrillation ([address] )    • Basal cell carcinoma 03/22/2022    Tip of Nose   • CHF (congestive heart failure) (HCC)    • Diabetes mellitus (HCC)     Niddm   • DVT (deep vein thrombosis) in pregnancy 1966    not in pregnancy   • DVT (deep venous thrombosis) ([address] ) 1966   • Dyslipidemia    • Encephalopathy acute 11/4/2022   • GERD (gastroesophageal reflux disease)    • Hyperlipidemia    • Hypertension    • Hypomagnesemia 10/19/2022   • Irregular heart beat     Afib   • Morbid obesity due to excess calories ([address] )     Resolved 9/2/2014    • Pulmonary embolism (HCC)    • Sepsis ([address] )    • Squamous cell skin cancer 07/30/2020    Left posterior scalp   • Visual impairment      Past Surgical History:   Procedure Laterality Date   • AORTIC VALVE REPLACEMENT     • CARDIAC DEFIBRILLATOR PLACEMENT  04/2014   • CARDIAC SURGERY  02/2014    AVR   • COLONOSCOPY     • INSERT / REPLACE / REMOVE PACEMAKER     • IR ASPIRATION BAKERS CYST  3/8/2023   • IR CHOLECYSTOSTOMY TUBE CHECK/CHANGE/REPOSITION/REINSERTION/UPSIZE  10/13/2022   • IR CHOLECYSTOSTOMY TUBE CHECK/CHANGE/REPOSITION/REINSERTION/UPSIZE  10/19/2022   • IR CHOLECYSTOSTOMY TUBE CHECK/CHANGE/REPOSITION/REINSERTION/UPSIZE  10/27/2022   • IR CHOLECYSTOSTOMY TUBE CHECK/CHANGE/REPOSITION/REINSERTION/UPSIZE  11/7/2022   • IR CHOLECYSTOSTOMY TUBE CHECK/CHANGE/REPOSITION/REINSERTION/UPSIZE  11/10/2022   • IR CHOLECYSTOSTOMY TUBE CHECK/CHANGE/REPOSITION/REINSERTION/UPSIZE  12/1/2022   • IR CHOLECYSTOSTOMY TUBE CHECK/CHANGE/REPOSITION/REINSERTION/UPSIZE  1/6/2023   • IR CHOLECYSTOSTOMY TUBE CHECK/CHANGE/REPOSITION/REINSERTION/UPSIZE  1/24/2023   • IR CHOLECYSTOSTOMY TUBE CHECK/CHANGE/REPOSITION/REINSERTION/UPSIZE  3/15/2023   • IR CHOLECYSTOSTOMY TUBE PLACEMENT  9/1/2022   • IR DRAINAGE TUBE CHECK AND/OR REMOVAL  3/22/2023   • JOINT REPLACEMENT Left     LTKR   • MOHS SURGERY  07/30/2020    Left posterior scalp, Dr Hermann Leal   • [address]  04/18/2022    [address] Tip of Nose- Dr Hermann Leal   • Padma Leader DIVJ&STRIP LONG SAPH [place] Right 8/17/2018    Procedure: LEG PERFORATED INJECTION SCLEROTHERAPY;  Surgeon: Sudhakar Byrd MD;  Location: [facility];  Service: Vascular   • REPLACEMENT TOTAL KNEE Right    • TOTAL KNEE ARTHROPLASTY Left    • VASCULAR SURGERY     • VENA CAVA FILTER PLACEMENT      Interruption inferior vena cava, Josue filter, placement   • WISDOM TOOTH EXTRACTION       Social History   Social History     Substance and Sexual Activity   Alcohol Use Not Currently    Comment: no alcohol in 28 yrs     Social History     Substance and Sexual Activity   Drug Use Never     Social History     Tobacco Use   Smoking Status Never   Smokeless Tobacco Never     Family History:   Family History   Problem Relation Age of Onset   • Arthritis Mother    • Stroke Mother    • Arthritis Father    • Cancer Father    • Arthritis Daughter        Meds/Allergies   PTA meds:   Prior to Admission Medications   Prescriptions Last Dose Informant Patient Reported? Taking? Acetaminophen (TYLENOL EXTRA STRENGTH PO)   Yes No   Sig: Take 500 mg by mouth if needed (for pain as needed)  Indications: pain as needed   B Complex-C (SUPER B COMPLEX PO)   Yes No   Sig: Take 1 capsule by mouth daily    Diclofenac Sodium (VOLTAREN) 1 %   No No   Sig: Apply 2 g topically 4 (four) times a day   Iron Combinations (NIFEREX) TABS   Yes No   Sig: Take 1 tablet by mouth in the morning   Multiple Vitamin (MULTIVITAMINS PO)   Yes No   Sig: Take 1 tablet by mouth daily   albuterol (PROVENTIL HFA,VENTOLIN HFA) 90 mcg/act inhaler   Yes No   Sig: Inhale 2 puffs every 6 (six) hours as needed for wheezing or shortness of breath   allopurinol (ZYLOPRIM) 100 mg tablet   No No   Sig: Take 1 tablet (100 mg total) by mouth daily Do not start before November 15, 2022  atorvastatin (LIPITOR) 40 mg tablet   Yes No   Sig: Take 40 mg by mouth daily after dinner Atorvastatin Calcium 40 MG Oral Tablet Take 1 tablet daily  Refills: 0  Active    fluticasone (FLONASE) 50 mcg/act nasal spray   Yes No   Si sprays into each nostril daily as needed Shake liquid and spray   gabapentin (NEURONTIN) 100 mg capsule   Yes No   Sig: Take 100 mg by mouth daily   metFORMIN (GLUCOPHAGE) 1000 MG tablet   Yes No   Sig: Take 1,000 mg by mouth daily with dinner  Indications: Type 2 Diabetes   mirtazapine (REMERON) 7 5 MG tablet   Yes No   Sig: Take 15 mg by mouth daily at bedtime ordered by Dr Nathaniel Baptiste   omeprazole (PriLOSEC) 20 mg delayed release capsule   Yes No   Sig: Omeprazole 20 MG Oral Tablet Delayed Release Take 1 tablet daily  Refills: 0  Active   polyethylene glycol (GLYCOLAX) 17 GM/SCOOP powder   Yes No   Sig: Take 17 g by mouth 2 (two) times a day   sodium chloride, PF, 0 9 %   No No   Sig: 10 mL by Intracatheter route daily Intracatheter flushing daily   May substitute prefilled syringe with normal saline 10 mL vials, 10 mL syringes, and 18 g blunt needles   Patient taking differently: 10 mL by Intracatheter route every 12 (twelve) hours Intracatheter flushing daily  May substitute prefilled syringe with normal saline 10 mL vials, 10 mL syringes, and 18 g blunt needles   sotalol (BETAPACE) 80 mg tablet   No No   Sig: Take 1 tablet (80 mg total) by mouth daily   torsemide (DEMADEX) 20 mg tablet   Yes No   Sig: Take 20 mg by mouth daily and 10 mg in PM   warfarin (COUMADIN) 2 mg tablet   No No   Sig: Take 2 tablets (4 mg total) by mouth daily Acknowledge      Facility-Administered Medications: None     Allergies   Allergen Reactions   • Tramadol Other (See Comments)     intolerance       Objective   First Vitals:   Blood Pressure: 161/79 (04/04/23 1847)  Pulse: 101 (04/04/23 1847)  Temperature: (!) 101 9 °F (38 8 °C) (04/04/23 1849)  Temp Source: Oral (04/04/23 2330)  Respirations: (!) 24 (04/04/23 1847)  SpO2: 98 % (04/04/23 1840)    Current Vitals:   Blood Pressure: 96/54 (04/05/23 0000)  Pulse: 81 (04/05/23 0000)  Temperature: 97 7 °F (36 5 °C) (04/04/23 2345)  Temp Source: Oral (04/04/23 2345)  Respirations: (!) 26 (04/05/23 0000)  SpO2: 99 % (04/05/23 0000)      Intake/Output Summary (Last 24 hours) at 4/5/2023 0055  Last data filed at 4/5/2023 0053  Gross per 24 hour   Intake 200 ml   Output --   Net 200 ml       Invasive Devices     Peripheral Intravenous Line  Duration           Peripheral IV 03/08/23 Right Antecubital 27 days    Peripheral IV 04/04/23 Left Antecubital <1 day    Peripheral IV 04/04/23 Right;Ventral (anterior) Forearm <1 day                Physical Exam   Gen: NAD, Comfortable  Neuro: A&O, No focal deficits  Head: Normal Cephalic, Atraumatic  Eye: EOMI, PERRLA, No scleral icterus  Neck: Supple, No JVD, Midline trachea  CV: RRR, Cap refill <2 sec  Pulm: Tachypneic to 28  BiPAP 13/10 on 35% FiO2 with SPO2 of 98  Abd: Soft, Non-Distended, minimal LLQ tenderness    No rebound, no guarding  Ext: No edema, Non-tender  Skin: warm, dry, intact      Lab Results:   I have personally reviewed pertinent lab results  , CBC:   Lab Results   Component Value Date    WBC 13 85 (H) 04/04/2023    HGB 10 9 (L) 04/04/2023    HCT 32 (L) 04/04/2023    MCV 93 04/04/2023     04/04/2023    MCH 28 1 04/04/2023    MCHC 30 2 (L) 04/04/2023    RDW 17 3 (H) 04/04/2023    MPV 9 6 04/04/2023    NRBC 0 04/04/2023   , CMP:   Lab Results   Component Value Date    SODIUM 138 04/04/2023    K 4 4 04/04/2023     04/04/2023    CO2 23 04/04/2023    CO2 24 04/04/2023    BUN 20 04/04/2023    CREATININE 1 46 (H) 04/04/2023    GLUCOSE 149 (H) 04/04/2023    CALCIUM 9 2 04/04/2023    AST 29 04/04/2023    ALT 16 04/04/2023    ALKPHOS 103 04/04/2023    EGFR 42 04/04/2023   , Coagulation:   Lab Results   Component Value Date    INR 4 80 (H) 04/04/2023   , Urinalysis:   Lab Results   Component Value Date    COLORU Light Yellow 04/04/2023    CLARITYU Clear 04/04/2023    SPECGRAV 1 010 04/04/2023    PHUR 5 0 04/04/2023    LEUKOCYTESUR Negative 04/04/2023    NITRITE Negative 04/04/2023    GLUCOSEU Negative 04/04/2023    KETONESU Negative 04/04/2023    BILIRUBINUR Negative 04/04/2023    BLOODU Negative 04/04/2023   , Amylase: No results found for: AMYLASE, Lipase:   Lab Results   Component Value Date    LIPASE 24 04/04/2023     Imaging: I have personally reviewed pertinent reports  CT chest abdomen pelvis w contrast    Result Date: 4/4/2023  Impression: 1  Moderate right pleural effusion is slightly increased since the prior study  New trace left pleural effusion is also seen  2   New lower lobe predominant pulmonary infiltrates bilaterally worse in the left lower lobe suggesting multifocal pneumonia  3   Likely reactive enlarged mediastinal/hilar lymph nodes measuring up to 2 2 x 1 6 cm  4   Interval removal of percutaneous cholecystostomy tube    Gallbladder is contracted containing small stones demonstrated mild diffuse wall thickening, similar compared to the prior exam   Findings may indicate chronic cholecystitis  5   There is an irregular partially loculated fluid collection surrounding the cecum, ascending colon, and inferior right hepatic lobe measuring approximately 9 4 x 5 1 cm in axial dimension and 13 cm in craniocaudal dimension  This could be from bile leak given interval cholecystostomy tube placement and removal   Alternatively, cannot exclude contained perforation of the right colon without pericolonic abscess  Clinical correlation recommended  6   No evidence for bowel obstruction, obstructive uropathy, or free air  7   Small umbilical hernia containing mesenteric fat and trace ascites noted  8   Subcutaneous stranding in the lower abdominal/pelvic wall and bilateral flank regions could be due to edema or cellulitis  Please correlate with physical exam findings/direct visualization  Other ancillary findings detailed above  Workstation performed: [id number]     EKG, Pathology, and Other Studies: I have personally reviewed pertinent reports  Code Status: Level 1 - Full Code  Advance Directive and Living Will:      Power of :    POLST:      Counseling / Coordination of Care  Total floor / unit time spent today 30 minutes  This involved direct patient contact where I performed a full history and physical, reviewed previous records, and reviewed laboratory and other diagnostic studies  Greater than 50% of total time was spent with the patient and / or family counseling and / or coordination of care      Funmilayo Donohue MD  4/5/2023

## 2023-04-05 NOTE — PROGRESS NOTES
"Progress Note - General Surgery   Luz Jimenez 81 y o  male MRN: [id number]  Unit/Bed#: ICU 02 Encounter: [id number]    Assessment:  80yo M with abdominal fluid collection suspected to be bile leak in the setting of recurrent cholecystitis requiring multiple perc jeny tubes  Also positive for covid    WBC 20 01 (21 66)  Creatinine 1 38 (1 39)    INR 1 57 (1 6) after 2u [address]    Plan:  - NPO for IR procedure  - IR drainage of abdominal collection pending INR <1 5  - cont abx  - appreciate remainder of care per ICU team  - Please TigerText the surgery resident or AP role with any questions  Subjective/Objective   Subjective:   No acute events overnight  No abdominal pain  Objective:     Blood pressure 147/93, pulse 83, temperature 97 7 °F (36 5 °C), temperature source Oral, resp  rate (!) 30, height 6' 1\" (1 854 m), weight (!) 147 kg (324 lb 1 2 oz), SpO2 96 %  ,Body mass index is 42 2 kg/m²  Intake/Output Summary (Last 24 hours) at 4/5/2023 1645  Last data filed at 4/5/2023 1617  Gross per 24 hour   Intake 3730 83 ml   Output --   Net 3730 83 ml       Invasive Devices     Peripheral Intravenous Line  Duration           Peripheral IV 03/08/23 Right Antecubital 28 days    Peripheral IV 04/04/23 Right;Ventral (anterior) Forearm <1 day                Physical Exam:  General: No acute distress  Neuro: alert and oriented  HEENT: moist mucous membranes  CV: Well perfused, regular rate and rhythm  Lungs: Normal work of breathing, no increased respiratory effort  Abdomen: Soft, non-tender, non-distended    Extremities: No edema, clubbing or cyanosis  Skin: Warm, dry      Lady Mai MD  General Surgery PGY1    "

## 2023-04-05 NOTE — ASSESSMENT & PLAN NOTE
· Hold Coumadin until further notice  · Reverse affects w/ K-centra  · Recheck INR in am  · Resume when cleared from all procedures

## 2023-04-05 NOTE — PLAN OF CARE
Problem: PAIN - ADULT  Goal: Verbalizes/displays adequate comfort level or baseline comfort level  Description: Interventions:  - Encourage patient to monitor pain and request assistance  - Assess pain using appropriate pain scale  - Administer analgesics based on type and severity of pain and evaluate response  - Implement non-pharmacological measures as appropriate and evaluate response  - Consider cultural and social influences on pain and pain management  - Notify physician/advanced practitioner if interventions unsuccessful or patient reports new pain  Outcome: Progressing     Problem: INFECTION - ADULT  Goal: Absence or prevention of progression during hospitalization  Description: INTERVENTIONS:  - Assess and monitor for signs and symptoms of infection  - Monitor lab/diagnostic results  - Monitor all insertion sites, i e  indwelling lines, tubes, and drains  - Monitor endotracheal if appropriate and nasal secretions for changes in amount and color  - [place] appropriate cooling/warming therapies per order  - Administer medications as ordered  - Instruct and encourage patient and family to use good hand hygiene technique  - Identify and instruct in appropriate isolation precautions for identified infection/condition  Outcome: Progressing  Goal: Absence of fever/infection during neutropenic period  Description: INTERVENTIONS:  - Monitor WBC    Outcome: Progressing     Problem: SAFETY ADULT  Goal: Patient will remain free of falls  Description: INTERVENTIONS:  - Educate patient/family on patient safety including physical limitations  - Instruct patient to call for assistance with activity   - Consult OT/PT to assist with strengthening/mobility   - Keep Call bell within reach  - Keep bed low and locked with side rails adjusted as appropriate  - Keep care items and personal belongings within reach  - Initiate and maintain comfort rounds  - Make Fall Risk Sign visible to staff  - Obtain necessary fall risk management equipment  - Apply yellow socks and bracelet for high fall risk patients  - Consider moving patient to room near nurses station  Outcome: Progressing  Goal: Maintain or return to baseline ADL function  Description: INTERVENTIONS:  -  Assess patient's ability to carry out ADLs; assess patient's baseline for ADL function and identify physical deficits which impact ability to perform ADLs (bathing, care of mouth/teeth, toileting, grooming, dressing, etc )  - Assess/evaluate cause of self-care deficits   - Assess range of motion  - Assess patient's mobility; develop plan if impaired  - Assess patient's need for assistive devices and provide as appropriate  - Encourage maximum independence but intervene and supervise when necessary  - Involve family in performance of ADLs  - Assess for home care needs following discharge   - Consider OT consult to assist with ADL evaluation and planning for discharge  - Provide patient education as appropriate  Outcome: Progressing  Goal: Maintains/Returns to pre admission functional level  Description: INTERVENTIONS:  - Perform BMAT or MOVE assessment daily    - Set and communicate daily mobility goal to care team and patient/family/caregiver     - Collaborate with rehabilitation services on mobility goals if consulted  - Perform Range of Motion  - Reposition   - Out of bed for toileting  - Record patient progress and toleration of activity level   Outcome: Progressing     Problem: DISCHARGE PLANNING  Goal: Discharge to home or other facility with appropriate resources  Description: INTERVENTIONS:  - Identify barriers to discharge w/patient and caregiver  - Arrange for needed discharge resources and transportation as appropriate  - Identify discharge learning needs (meds, wound care, etc )  - Arrange for interpretive services to assist at discharge as needed  - Refer to Case Management Department for coordinating discharge planning if the patient needs post-hospital services based on physician/advanced practitioner order or complex needs related to functional status, cognitive ability, or social support system  Outcome: Progressing     Problem: Knowledge Deficit  Goal: Patient/family/caregiver demonstrates understanding of disease process, treatment plan, medications, and discharge instructions  Description: Complete learning assessment and assess knowledge base  Interventions:  - Provide teaching at level of understanding  - Provide teaching via preferred learning methods  Outcome: Progressing     Problem: Nutrition/Hydration-ADULT  Goal: Nutrient/Hydration intake appropriate for improving, restoring or maintaining nutritional needs  Description: Monitor and assess patient's nutrition/hydration status for malnutrition  Collaborate with interdisciplinary team and initiate plan and interventions as ordered  Monitor patient's weight and dietary intake as ordered or per policy  Utilize nutrition screening tool and intervene as necessary  Determine patient's food preferences and provide high-protein, high-caloric foods as appropriate       INTERVENTIONS:  - Monitor oral intake, urinary output, labs, and treatment plans  - Assess nutrition and hydration status and recommend course of action  - Evaluate amount of meals eaten  - Assist patient with eating if necessary   - Allow adequate time for meals  - Recommend/ encourage appropriate diets, oral nutritional supplements, and vitamin/mineral supplements  - Order, calculate, and assess calorie counts as needed  - Recommend, monitor, and adjust tube feedings and TPN/PPN based on assessed needs  - Assess need for intravenous fluids  - Provide specific nutrition/hydration education as appropriate  - Include patient/family/caregiver in decisions related to nutrition  Outcome: Progressing     Problem: MOBILITY - ADULT  Goal: Maintain or return to baseline ADL function  Description: INTERVENTIONS:  -  Assess patient's ability to carry out ADLs; assess patient's baseline for ADL function and identify physical deficits which impact ability to perform ADLs (bathing, care of mouth/teeth, toileting, grooming, dressing, etc )  - Assess/evaluate cause of self-care deficits   - Assess range of motion  - Assess patient's mobility; develop plan if impaired  - Assess patient's need for assistive devices and provide as appropriate  - Encourage maximum independence but intervene and supervise when necessary  - Involve family in performance of ADLs  - Assess for home care needs following discharge   - Consider OT consult to assist with ADL evaluation and planning for discharge  - Provide patient education as appropriate  Outcome: Progressing  Goal: Maintains/Returns to pre admission functional level  Description: INTERVENTIONS:  - Perform BMAT or MOVE assessment daily    - Set and communicate daily mobility goal to care team and patient/family/caregiver     - Collaborate with rehabilitation services on mobility goals if consulted  - Perform Range of Motion  - Reposition patient   - Record patient progress and toleration of activity level   Outcome: Progressing     Problem: Prexisting or High Potential for Compromised Skin Integrity  Goal: Skin integrity is maintained or improved  Description: INTERVENTIONS:  - Identify patients at risk for skin breakdown  - Assess and monitor skin integrity  - Assess and monitor nutrition and hydration status  - Monitor labs   - Assess for incontinence   - Turn and reposition patient  - Assist with mobility/ambulation  - Relieve pressure over bony prominences  - Avoid friction and shearing  - Provide appropriate hygiene as needed including keeping skin clean and dry  - Evaluate need for skin moisturizer/barrier cream  - Collaborate with interdisciplinary team   - Patient/family teaching  - Consider wound care consult   Outcome: Progressing

## 2023-04-06 ENCOUNTER — APPOINTMENT (INPATIENT)
Dept: RADIOLOGY | Facility: HOSPITAL | Age: 88
End: 2023-04-06
Attending: RADIOLOGY

## 2023-04-06 LAB
ANION GAP SERPL CALCULATED.3IONS-SCNC: 10 MMOL/L (ref 4–13)
APPEARANCE FLD: ABNORMAL
BUN SERPL-MCNC: 28 MG/DL (ref 5–25)
CA-I BLD-SCNC: 1.09 MMOL/L (ref 1.12–1.32)
CALCIUM SERPL-MCNC: 8.6 MG/DL (ref 8.4–10.2)
CHLORIDE SERPL-SCNC: 106 MMOL/L (ref 96–108)
CO2 SERPL-SCNC: 23 MMOL/L (ref 21–32)
COLOR FLD: YELLOW
CREAT SERPL-MCNC: 1.38 MG/DL (ref 0.6–1.3)
ERYTHROCYTE [DISTWIDTH] IN BLOOD BY AUTOMATED COUNT: 17.4 % (ref 11.6–15.1)
GFR SERPL CREATININE-BSD FRML MDRD: 45 ML/MIN/1.73SQ M
GLUCOSE SERPL-MCNC: 133 MG/DL (ref 65–140)
GLUCOSE SERPL-MCNC: 133 MG/DL (ref 65–140)
GLUCOSE SERPL-MCNC: 151 MG/DL (ref 65–140)
GLUCOSE SERPL-MCNC: 152 MG/DL (ref 65–140)
GLUCOSE SERPL-MCNC: 155 MG/DL (ref 65–140)
GLUCOSE SERPL-MCNC: 157 MG/DL (ref 65–140)
HCT VFR BLD AUTO: 30.2 % (ref 36.5–49.3)
HGB BLD-MCNC: 9.1 G/DL (ref 12–17)
INR PPP: 1.57 (ref 0.84–1.19)
LYMPHOCYTES NFR BLD AUTO: 71 %
MAGNESIUM SERPL-MCNC: 2.2 MG/DL (ref 1.9–2.7)
MCH RBC QN AUTO: 28.2 PG (ref 26.8–34.3)
MCHC RBC AUTO-ENTMCNC: 30.1 G/DL (ref 31.4–37.4)
MCV RBC AUTO: 94 FL (ref 82–98)
MONOCYTES NFR BLD AUTO: 6 %
MRSA NOSE QL CULT: NORMAL
NEUTS SEG NFR BLD AUTO: 23 %
PHOSPHATE SERPL-MCNC: 3.8 MG/DL (ref 2.3–4.1)
PLATELET # BLD AUTO: 246 THOUSANDS/UL (ref 149–390)
PMV BLD AUTO: 9.6 FL (ref 8.9–12.7)
POTASSIUM SERPL-SCNC: 4.4 MMOL/L (ref 3.5–5.3)
PROCALCITONIN SERPL-MCNC: 3.48 NG/ML
PROTHROMBIN TIME: 19 SECONDS (ref 11.6–14.5)
RBC # BLD AUTO: 3.23 MILLION/UL (ref 3.88–5.62)
SITE: ABNORMAL
SODIUM SERPL-SCNC: 139 MMOL/L (ref 135–147)
TOTAL CELLS COUNTED SPEC: 100
WBC # BLD AUTO: 20.01 THOUSAND/UL (ref 4.31–10.16)
WBC # FLD MANUAL: 139 /UL

## 2023-04-06 RX ORDER — LIDOCAINE HYDROCHLORIDE 10 MG/ML
INJECTION, SOLUTION EPIDURAL; INFILTRATION; INTRACAUDAL; PERINEURAL AS NEEDED
Status: COMPLETED | OUTPATIENT
Start: 2023-04-06 | End: 2023-04-06

## 2023-04-06 RX ORDER — INSULIN LISPRO 100 [IU]/ML
1-5 INJECTION, SOLUTION INTRAVENOUS; SUBCUTANEOUS
Status: DISCONTINUED | OUTPATIENT
Start: 2023-04-06 | End: 2023-04-07 | Stop reason: HOSPADM

## 2023-04-06 RX ORDER — INSULIN LISPRO 100 [IU]/ML
2-12 INJECTION, SOLUTION INTRAVENOUS; SUBCUTANEOUS
Status: DISCONTINUED | OUTPATIENT
Start: 2023-04-07 | End: 2023-04-07 | Stop reason: HOSPADM

## 2023-04-06 RX ORDER — SODIUM CHLORIDE 9 MG/ML
10 INJECTION INTRAVENOUS DAILY
Qty: 300 ML | Refills: 3 | Status: SHIPPED | OUTPATIENT
Start: 2023-04-06 | End: 2023-08-04

## 2023-04-06 RX ADMIN — INSULIN LISPRO 2 UNITS: 100 INJECTION, SOLUTION INTRAVENOUS; SUBCUTANEOUS at 19:30

## 2023-04-06 RX ADMIN — INSULIN LISPRO 1 UNITS: 100 INJECTION, SOLUTION INTRAVENOUS; SUBCUTANEOUS at 23:44

## 2023-04-06 RX ADMIN — BARICITINIB 2 MG: 2 TABLET, FILM COATED ORAL at 03:21

## 2023-04-06 RX ADMIN — FAMOTIDINE 20 MG: 10 INJECTION, SOLUTION INTRAVENOUS at 20:19

## 2023-04-06 RX ADMIN — METRONIDAZOLE 500 MG: 500 INJECTION, SOLUTION INTRAVENOUS at 04:33

## 2023-04-06 RX ADMIN — FAMOTIDINE 20 MG: 10 INJECTION, SOLUTION INTRAVENOUS at 07:45

## 2023-04-06 RX ADMIN — LIDOCAINE HYDROCHLORIDE 8 ML: 10 INJECTION, SOLUTION EPIDURAL; INFILTRATION; INTRACAUDAL; PERINEURAL at 12:42

## 2023-04-06 RX ADMIN — METRONIDAZOLE 500 MG: 500 INJECTION, SOLUTION INTRAVENOUS at 12:08

## 2023-04-06 RX ADMIN — VANCOMYCIN HYDROCHLORIDE 1000 MG: 1 INJECTION, SOLUTION INTRAVENOUS at 09:38

## 2023-04-06 RX ADMIN — BENZONATATE 100 MG: 100 CAPSULE ORAL at 07:45

## 2023-04-06 RX ADMIN — MIRTAZAPINE 15 MG: 15 TABLET, FILM COATED ORAL at 23:45

## 2023-04-06 RX ADMIN — NYSTATIN: 100000 POWDER TOPICAL at 19:31

## 2023-04-06 RX ADMIN — METRONIDAZOLE 500 MG: 500 INJECTION, SOLUTION INTRAVENOUS at 20:18

## 2023-04-06 RX ADMIN — CEFEPIME 2000 MG: 2 INJECTION, POWDER, FOR SOLUTION INTRAVENOUS at 19:30

## 2023-04-06 RX ADMIN — NYSTATIN: 100000 POWDER TOPICAL at 07:46

## 2023-04-06 RX ADMIN — REMDESIVIR 100 MG: 100 INJECTION, POWDER, LYOPHILIZED, FOR SOLUTION INTRAVENOUS at 03:21

## 2023-04-06 RX ADMIN — CEFEPIME 2000 MG: 2 INJECTION, POWDER, FOR SOLUTION INTRAVENOUS at 07:46

## 2023-04-06 RX ADMIN — INSULIN LISPRO 2 UNITS: 100 INJECTION, SOLUTION INTRAVENOUS; SUBCUTANEOUS at 06:38

## 2023-04-06 RX ADMIN — SENNOSIDES AND DOCUSATE SODIUM 1 TABLET: 8.6; 5 TABLET ORAL at 23:45

## 2023-04-06 RX ADMIN — DEXAMETHASONE SODIUM PHOSPHATE 6 MG: 4 INJECTION INTRA-ARTICULAR; INTRALESIONAL; INTRAMUSCULAR; INTRAVENOUS; SOFT TISSUE at 03:21

## 2023-04-06 RX ADMIN — HEPARIN SODIUM 5000 UNITS: 5000 INJECTION INTRAVENOUS; SUBCUTANEOUS at 23:45

## 2023-04-06 RX ADMIN — SOTALOL HYDROCHLORIDE 80 MG: 80 TABLET ORAL at 07:45

## 2023-04-06 RX ADMIN — HEPARIN SODIUM 5000 UNITS: 5000 INJECTION INTRAVENOUS; SUBCUTANEOUS at 05:34

## 2023-04-06 NOTE — DISCHARGE INSTR - OTHER ORDERS
Wound/Skin Care DIscharge Plan:   1  For lower leg wounds: Cleanse with normal saline or foam cleanser  Blot dry  Apply alcohol free barrier film to periwound  Apply Acticoat 3 silver impregnated contact layer dressings to wounds  Then place Maxorb calcium alginate sheet dressings on top of Acticoat dressings  The purpose of Acticoat is to provide antimicrobial dressing to leg wounds  The purpose of the Maxorb is to absorb the drainage since Acticoat has no absorptive ability  Then cover with ABD pads and secure with gauze rolls and tape  Change dressing every two days and as needed  2  Continue frequent turning and repositioning  Use wedges  3  Offload heels from mattress surface  4  Pressure redistribution cushion in chair  5  Moisturize skin each day

## 2023-04-06 NOTE — ASSESSMENT & PLAN NOTE
· Recent perc jeny tube removed 2 weeks ago  · Large collection on CT 9 x 5 x 13 cm concerning for Biloma vs possible abscess  · Hold AC  · Continue Flagyl (day 3)  · 4/6: s/p IR drainage tube placement in right lower abdomen

## 2023-04-06 NOTE — CASE MANAGEMENT
Case Management Discharge Planning Note    Patient name Nancy Antonio  Location ICU 02/ICU 41 MRN [id number]  : 1935 Date 2023       Current Admission Date: 2023  Current Admission Diagnosis:Pneumonia of both lungs due to infectious organism   Patient Active Problem List    Diagnosis Date Noted   • Extrahepatic biloma 2023   • Pneumonia of both lungs due to infectious organism 2023   • Acute hypoxemic respiratory failure ([facility] ) 2023   • COVID-19 2023   • Pleural effusion on right 2023   • Calculus of gallbladder 2023   • Cholecystostomy care ([facility] ) 2022   • Gout 2022   • Acute on chronic cholecystitis 2022   • Acute cholecystitis 2022   • Benign prostatic hyperplasia with lower urinary tract symptoms 2022   • OBINNA (obstructive sleep apnea)    • CKD (chronic kidney disease) stage 3, GFR 30-59 ml/min ([facility] ) 2022   • Acute kidney injury ([facility] ) 2021   • Osteoarthritis, knee 11/15/2021   • Status post right knee replacement 11/15/2021   • Mixed hyperlipidemia 2021   • Swelling of limb 2021   • Chronic deep vein thrombosis (DVT) ([facility] ) 09/10/2020   • Secondary lymphedema 2018   • Venous ulcer of ankle, right ([facility] ) 2018   • S/P AVR 2018   • Thrombophlebitis 2018   • Presence of implantable cardioverter-defibrillator (ICD) 2018   • Chronic bilateral low back pain without sciatica 2017   • BPH (benign prostatic hyperplasia) 2017   • Obesity, unspecified obesity severity, unspecified obesity type 2017   • Hydrocele 2017   • Chronic anticoagulation 2017   • Anemia 2017   • Renal cyst 2017   • 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 2015   • Ventricular tachycardia 04/15/2014   • Paroxysmal atrial fibrillation ([facility] ) 2014   • Edema 2014   • Chronic diastolic CHF (congestive heart failure) ([facility] ) 2014   • Endocarditis 03/19/2014   • Type 2 diabetes mellitus with diabetic neuropathy, without long-term current use of insulin ([facility] ) 08/19/2013   • Peripheral venous insufficiency 08/19/2013   • Cardiomegaly 08/19/2013   • PVD (peripheral vascular disease) ([facility] ) 04/19/2013   • Dyslipidemia 04/19/2013      LOS (days): 1  Geometric Mean LOS (GMLOS) (days): 5 20  Days to GMLOS:3 6     OBJECTIVE:  Risk of Unplanned Readmission Score: 30 26         Current admission status: Inpatient   Preferred Pharmacy:   [address] [address]  Phone: [phone] Fax: [phone]    Primary Care Provider: Benja Domingo DO    Primary Insurance: MEDICARE  Secondary Insurance: [facility] HEALTH OPTIONS PROGRAM    DISCHARGE DETAILS:  CM [facility]  CM will f/u with patient/spouse to discuss Home health services at discharge and options

## 2023-04-06 NOTE — ASSESSMENT & PLAN NOTE
· Covid vs PNA  · Weaned off bipap and NC, on RA today  · Bipap and NC as needed  · Started moderate Covid pathway

## 2023-04-06 NOTE — PLAN OF CARE
Problem: PAIN - ADULT  Goal: Verbalizes/displays adequate comfort level or baseline comfort level  Description: Interventions:  - Encourage patient to monitor pain and request assistance  - Assess pain using appropriate pain scale  - Administer analgesics based on type and severity of pain and evaluate response  - Implement non-pharmacological measures as appropriate and evaluate response  - Consider cultural and social influences on pain and pain management  - Notify physician/advanced practitioner if interventions unsuccessful or patient reports new pain  4/6/2023 1243 by Klaudia Trevizo RN  Outcome: Progressing  4/6/2023 1243 by Klaudia Trevizo RN  Outcome: Progressing     Problem: INFECTION - ADULT  Goal: Absence or prevention of progression during hospitalization  Description: INTERVENTIONS:  - Assess and monitor for signs and symptoms of infection  - Monitor lab/diagnostic results  - Monitor all insertion sites, i e  indwelling lines, tubes, and drains  - Monitor endotracheal if appropriate and nasal secretions for changes in amount and color  - [place] appropriate cooling/warming therapies per order  - Administer medications as ordered  - Instruct and encourage patient and family to use good hand hygiene technique  - Identify and instruct in appropriate isolation precautions for identified infection/condition  4/6/2023 1243 by Klaudia Trevizo RN  Outcome: Progressing  4/6/2023 1243 by Klaudia Trevizo RN  Outcome: Progressing  Goal: Absence of fever/infection during neutropenic period  Description: INTERVENTIONS:  - Monitor WBC    4/6/2023 1243 by Klaudia Trevizo RN  Outcome: Progressing  4/6/2023 1243 by Klaudia Trevizo RN  Outcome: Progressing     Problem: SAFETY ADULT  Goal: Patient will remain free of falls  Description: INTERVENTIONS:  - Educate patient/family on patient safety including physical limitations  - Instruct patient to call for assistance with activity - Consult OT/PT to assist with strengthening/mobility   - Keep Call bell within reach  - Keep bed low and locked with side rails adjusted as appropriate  - Keep care items and personal belongings within reach  - Initiate and maintain comfort rounds  - Make Fall Risk Sign visible to staff  - Offer Toileting every  Hours, in advance of need  - Initiate/Maintain alarm  - Obtain necessary fall risk management equipment:   - Apply yellow socks and bracelet for high fall risk patients  - Consider moving patient to room near nurses station  4/6/2023 1243 by Kaveh Chacon RN  Outcome: Progressing  4/6/2023 1243 by Kaveh Chacon RN  Outcome: Progressing  Goal: Maintain or return to baseline ADL function  Description: INTERVENTIONS:  -  Assess patient's ability to carry out ADLs; assess patient's baseline for ADL function and identify physical deficits which impact ability to perform ADLs (bathing, care of mouth/teeth, toileting, grooming, dressing, etc )  - Assess/evaluate cause of self-care deficits   - Assess range of motion  - Assess patient's mobility; develop plan if impaired  - Assess patient's need for assistive devices and provide as appropriate  - Encourage maximum independence but intervene and supervise when necessary  - Involve family in performance of ADLs  - Assess for home care needs following discharge   - Consider OT consult to assist with ADL evaluation and planning for discharge  - Provide patient education as appropriate  4/6/2023 1243 by Kaveh Chacon RN  Outcome: Progressing  4/6/2023 1243 by Kaveh Chacon RN  Outcome: Progressing  Goal: Maintains/Returns to pre admission functional level  Description: INTERVENTIONS:  - Perform BMAT or MOVE assessment daily    - Set and communicate daily mobility goal to care team and patient/family/caregiver  - Collaborate with rehabilitation services on mobility goals if consulted  - Perform Range of Motion  times a day    - Reposition patient every  hours  - Dangle patient  times a day  - Stand patient  times a day  - Ambulate patient  times a day  - Out of bed to chair  times a day   - Out of bed for meals  times a day  - Out of bed for toileting  - Record patient progress and toleration of activity level   4/6/2023 1243 by Jaimie Angel RN  Outcome: Progressing  4/6/2023 1243 by Jaimie Angel RN  Outcome: Progressing     Problem: DISCHARGE PLANNING  Goal: Discharge to home or other facility with appropriate resources  Description: INTERVENTIONS:  - Identify barriers to discharge w/patient and caregiver  - Arrange for needed discharge resources and transportation as appropriate  - Identify discharge learning needs (meds, wound care, etc )  - Arrange for interpretive services to assist at discharge as needed  - Refer to Case Management Department for coordinating discharge planning if the patient needs post-hospital services based on physician/advanced practitioner order or complex needs related to functional status, cognitive ability, or social support system  4/6/2023 1243 by Jaimie Angel RN  Outcome: Progressing  4/6/2023 1243 by Jaimie Angel RN  Outcome: Progressing     Problem: Nutrition/Hydration-ADULT  Goal: Nutrient/Hydration intake appropriate for improving, restoring or maintaining nutritional needs  Description: Monitor and assess patient's nutrition/hydration status for malnutrition  Collaborate with interdisciplinary team and initiate plan and interventions as ordered  Monitor patient's weight and dietary intake as ordered or per policy  Utilize nutrition screening tool and intervene as necessary  Determine patient's food preferences and provide high-protein, high-caloric foods as appropriate       INTERVENTIONS:  - Monitor oral intake, urinary output, labs, and treatment plans  - Assess nutrition and hydration status and recommend course of action  - Evaluate amount of meals eaten  - Assist patient with eating if necessary   - Allow adequate time for meals  - Recommend/ encourage appropriate diets, oral nutritional supplements, and vitamin/mineral supplements  - Order, calculate, and assess calorie counts as needed  - Recommend, monitor, and adjust tube feedings and TPN/PPN based on assessed needs  - Assess need for intravenous fluids  - Provide specific nutrition/hydration education as appropriate  - Include patient/family/caregiver in decisions related to nutrition  4/6/2023 1243 by Kaveh Chacon RN  Outcome: Progressing  4/6/2023 1243 by Kaveh Chacon RN  Outcome: Progressing     Problem: Knowledge Deficit  Goal: Patient/family/caregiver demonstrates understanding of disease process, treatment plan, medications, and discharge instructions  Description: Complete learning assessment and assess knowledge base    Interventions:  - Provide teaching at level of understanding  - Provide teaching via preferred learning methods  4/6/2023 1243 by Kaveh Chacon RN  Outcome: Progressing  4/6/2023 1243 by Kaveh Chacon RN  Outcome: Progressing     Problem: MOBILITY - ADULT  Goal: Maintain or return to baseline ADL function  Description: INTERVENTIONS:  -  Assess patient's ability to carry out ADLs; assess patient's baseline for ADL function and identify physical deficits which impact ability to perform ADLs (bathing, care of mouth/teeth, toileting, grooming, dressing, etc )  - Assess/evaluate cause of self-care deficits   - Assess range of motion  - Assess patient's mobility; develop plan if impaired  - Assess patient's need for assistive devices and provide as appropriate  - Encourage maximum independence but intervene and supervise when necessary  - Involve family in performance of ADLs  - Assess for home care needs following discharge   - Consider OT consult to assist with ADL evaluation and planning for discharge  - Provide patient education as appropriate  4/6/2023 1243 by Shira Ram RN  Outcome: Progressing  4/6/2023 1243 by Shira Ram RN  Outcome: Progressing  Goal: Maintains/Returns to pre admission functional level  Description: INTERVENTIONS:  - Perform BMAT or MOVE assessment daily    - Set and communicate daily mobility goal to care team and patient/family/caregiver  - Collaborate with rehabilitation services on mobility goals if consulted  - Perform Range of Motion  times a day  - Reposition patient every  hours    - Dangle patient  times a day  - Stand patient  times a day  - Ambulate patient  times a day  - Out of bed to chair  times a day   - Out of bed for meal times a day  - Out of bed for toileting  - Record patient progress and toleration of activity level   4/6/2023 1243 by Shira Ram RN  Outcome: Progressing  4/6/2023 1243 by Shira Ram RN  Outcome: Progressing     Problem: Prexisting or High Potential for Compromised Skin Integrity  Goal: Skin integrity is maintained or improved  Description: INTERVENTIONS:  - Identify patients at risk for skin breakdown  - Assess and monitor skin integrity  - Assess and monitor nutrition and hydration status  - Monitor labs   - Assess for incontinence   - Turn and reposition patient  - Assist with mobility/ambulation  - Relieve pressure over bony prominences  - Avoid friction and shearing  - Provide appropriate hygiene as needed including keeping skin clean and dry  - Evaluate need for skin moisturizer/barrier cream  - Collaborate with interdisciplinary team   - Patient/family teaching  - Consider wound care consult   4/6/2023 1243 by Shira Ram RN  Outcome: Progressing  4/6/2023 1243 by Shira Rma RN  Outcome: Progressing

## 2023-04-06 NOTE — ASSESSMENT & PLAN NOTE
Wt Readings from Last 3 Encounters:   04/06/23 (!) 147 kg (324 lb 1 2 oz)   03/08/23 (!) 137 kg (301 lb)   01/23/23 (!) 139 kg (307 lb)     · Monitor intake and output closely  · Avoid any aggressive fluids at this time  · Monitor daily weights

## 2023-04-06 NOTE — ASSESSMENT & PLAN NOTE
Lab Results   Component Value Date    HGBA1C 6 3 (H) 03/01/2022       Recent Labs     04/05/23  1717 04/06/23  0004 04/06/23  0612 04/06/23  1149   POCGLU 153* 133 151* 152*       Blood Sugar Average: Last 72 hrs:  (P) 135 [id number]   · Start SSI humalog

## 2023-04-06 NOTE — PLAN OF CARE
Problem: OCCUPATIONAL THERAPY ADULT  Goal: Performs self-care activities at highest level of function for planned discharge setting  See evaluation for individualized goals  Description: Treatment Interventions: ADL retraining, Functional transfer training, Endurance training, Cognitive reorientation, Patient/family training, Equipment evaluation/education, Compensatory technique education, Continued evaluation, Energy conservation          See flowsheet documentation for full assessment, interventions and recommendations  Note: Limitation: Decreased ADL status, Decreased cognition, Decreased endurance, Decreased self-care trans, Decreased high-level ADLs  Prognosis: Good  Assessment: Pt is a 81 y o  male seen for OT evaluation s/p admit to [address] [place] [address] on 4/4/2023 w/Pneumonia of both lungs due to infectious organism  Comorbidities affecting patient at time of evaluation are listed above  Personal and environmental factors affecting patient at time of evaluation include: limited insight into deficits, decreased initiation and engagement, difficulty performing ADLs and difficulty performing IADLs  PTA, patient was independent with functional mobility with RW, independent with ADLS, requiring assist for IADLS, living with spouse in a bi level home with FFSU and 0 steps to enter and works full time  The evaluation identifies the following performance deficits: weakness, decreased endurance, new onset of impairment of functional mobility, decreased ADLS, decreased IADLS, decreased activity tolerance, SOB upon exertion, decreased cognition and decreased strength, that result in activity limitations (as is outlined above in flowsheet)  The patient's raw score on the AM-PAC Daily Activity inpatient short form is 22, standardized score is 47 1, greater than 39 4  From an OT standpoint, patients at this level may benefit from d/c to Home with home health rehabilitation   Pt will benefit from continued IPOT treatment to address above deficits and maximize level of independence with ADLs and functional mobility in the areas of: bathing/ shower, dressing, toilet hygiene, transfer to all surfaces and functional ambulation       OT Discharge Recommendation: Home with home health rehabilitation (increased social support for ADL/IADL completion)     Lilliam Velez, OTR/L  [place] License [id number]  [facility] [id number]

## 2023-04-06 NOTE — ASSESSMENT & PLAN NOTE
· Hold Coumadin for now  · Appears to be in regular rhythm at present  · Rate controlled w/ Sotalol 80 mg daily (home med)

## 2023-04-06 NOTE — ASSESSMENT & PLAN NOTE
Lab Results   Component Value Date    EGFR 45 04/06/2023    EGFR 45 04/05/2023    EGFR 42 04/04/2023    CREATININE 1 38 (H) 04/06/2023    CREATININE 1 39 (H) 04/05/2023    CREATININE 1 46 (H) 04/04/2023     · 4/4: Creatinine 1 46, last check 0 95 Nov of 2022  · 4/6: Creatinine 1 38  · Avoid nephrotoxic meds, contrast, monitor for REMA

## 2023-04-06 NOTE — CONSULTS
Consult Note - Wound   Noemi Contreras 81 y o  male MRN: [id number]  Unit/Bed#: ICU 02 Encounter: [id number]      Assessment:   Patient admitted with Covid +  Has pneumonia  He is diabetic  Has a history of CHF  Sees outpatient wound care  Virtual wound care nurse consult performed  Images reviewed and reviewed outpatient wound care nurse notes  Wound/Skin Care Plan:   1  For lower leg wounds: Cleanse with normal saline or foam cleanser  Blot dry  Apply Cavilon alcohol free barrier film to periwound  Apply Acticoat 3 silver impregnated contact layer dressings to wounds  Then place Maxorb calcium alginate sheet dressings on top of Acticoat dressings  The purpose of Acticoat is to provide antimicrobial dressing to leg wounds  The purpose of the Maxorb is to absorb the drainage since Acticoat has no absorptive ability  Then cover with ABD pads and secure with gauze rolls and tape  Change dressing every two days and as needed  2  Continue frequent turning and repositioning  Use wedges  3  Offload heels from mattress surface  4  Pressure redistribution cushion in chair  5  Moisturize skin each day  6  Wound care nurse follow-up  7  Follow up at outpatient wound care center

## 2023-04-06 NOTE — ASSESSMENT & PLAN NOTE
· Recent perc jeny tube removed 2 weeks ago  · Large collection on CT 9 x 5 x 13 cm  · Pt denies any c/o abd pain at present but wife reports he c/o abd pain earlier today  · Biloma vs possible abscess  · Hold anticoagulation as pt will need drainage today  · Unable to palpate collection at this time  · Reverse current INR w/ [address] and Vit K  · IR plans to take pt for procedure when INR < 1 5

## 2023-04-06 NOTE — PROGRESS NOTES
Vancomycin IV Pharmacy-to-Dose Consultation    Ena Morris is a 81 y o  male who is currently receiving Vancomycin IV with management by the Pharmacy Consult service  Relevant clinical data and objective / subjective history reviewed  Vancomycin Assessment:  Indication and Goal AUC/Trough: Pneumonia (goal -600, trough >10)    Micro:       Renal Function:  SCr:  1 38 mg/dL  CrCl:  57 1 mL/min  Days of Therapy: 2  Current Dose:  1000mg IV every 24 hours  Vancomycin Plan:  New Dosing:  continue current dosing  Estimated AUC:  447 mcg*hr/mL  Estimated Trough:  12 2 mcg/mL  Next Level: 0600 on 4/7  Renal Function Monitoring: Daily BMP and UOP  Pharmacy will continue to follow closely for s/sx of nephrotoxicity, infusion reactions and appropriateness of therapy  BMP and CBC will be ordered per protocol  We will continue to follow the patient’s culture results and clinical progress daily      [facility], Pharmacist

## 2023-04-06 NOTE — PLAN OF CARE
Problem: PHYSICAL THERAPY ADULT  Goal: Performs mobility at highest level of function for planned discharge setting  See evaluation for individualized goals  Description: Treatment/Interventions: Functional transfer training, LE strengthening/ROM, Therapeutic exercise, Endurance training, Patient/family training, Equipment eval/education, Bed mobility, Gait training, Spoke to nursing, OT  Equipment Recommended: Maynor Sidhu       See flowsheet documentation for full assessment, interventions and recommendations  Outcome: Progressing  Note: Prognosis: Fair  Problem List: Decreased strength, Decreased range of motion, Impaired balance, Decreased mobility, Decreased safety awareness, Impaired judgement, Decreased cognition, Obesity (questionable higher level cognition)  Assessment: Pt is a 81 y o  male seen for PT evaluation s/p admit to [address] [place] [address] on 4/4/2023  Pt was admitted with a primary dx of: acute respiratory failure, CHF, SOB, Pleural effusion, REMA, severe sepsis, pneumonia  PT now consulted for assessment of mobility and d/c needs  Pt with Activity as tolerated orders  Pts current comorbidities and personal factors effecting treatment include: BMi, DM II, PVD, CKD, OA, GERD, COVID-19  Pts current clinical presentation is Unstable/Unpredictable (high complexity) due to Ongoing medical management for primary dx, Decreased activity tolerance compared to baseline, Fall risk, Increased assistance needed from caregiver at current time, Ongoing telemetry monitoring  Prior to admission, pt was independent with use of RW  Upon evaluation, pt currently is requiring ; Supervision for transfers and Supervision for ambulation 25 ft w/ RW   Pt presents at PT eval functioning below baseline and currently w/ overall mobility deficits 2* to: BLE weakness, decreased ROM, impaired balance, decreased endurance, impaired coordination, gait deviations, decreased activity tolerance compared to baseline, decreased functional mobility tolerance compared to baseline, altered sensation, decreased safety awareness, impaired judgement, fall risk, SOB upon exertion, decreased skin integrity  Pt currently at a fall risk 2* to impairments listed above  Pt will continue to benefit from skilled acute PT interventions to address stated impairments; to maximize functional mobility; for ongoing pt/ family training; and DME needs  At conclusion of PT session pt returned back in chair, chair alarm engaged, RN notified of session findings/recommendations and pt returned back in recliner chair with phone and call bell within reach  Pt denies any further questions at this time  Recommend home with HHPT upon hospital D/C  PT Discharge Recommendation: Home with home health rehabilitation    See flowsheet documentation for full assessment

## 2023-04-06 NOTE — ASSESSMENT & PLAN NOTE
· As seen on CT chest 4/4- significant infiltrate vs GGO LLE  · Now on room air  · Given Ceftriaxone and Azithro in ED  · Given recent admission on 3/8, abx changed to Cefepime, Vancomycin (both abx day 2)  · 4/6: May be secondary to Covid- start Moderate pathway: Decadron, baricitinib, remdesivir day 2  · Pending sputum culture, MRSA, Legionella, strep    · Stop vancomycin if MRSA culture (-)  · Preliminary BC's negative  · Encourage I/S-currently pulling 250-500 on IS  · Keep HOB elevated

## 2023-04-06 NOTE — QUICK NOTE
-Case discussed with IR on-call (Dr Petar Valerio) given INR of 1 57 this morning  -Plan to repeat INR at noon and if INR is above 1 5, plan to go to to IR with FFP running at time of placement  -INR ordered for noon, will follow up with IR when INR results    -Was called to bedside by RN due to dark bowel movement  On my physical exam: Guiac results (-), no gross rectal bleeding, no fissures, hemorrhoids or masses  Will continue to monitor bowel movements

## 2023-04-06 NOTE — PHYSICAL THERAPY NOTE
Physical Therapy Evaluation    Patient's Name: Corwin Queen    Admitting Diagnosis  Acute respiratory failure ([place] [address] ) [J96 00]  Lactic acidosis [E87 20]  Hypomagnesemia [E83 42]  CHF (congestive heart failure) ([place] [address] ) [I50 9]  Pneumonia [J18 9]  SOB (shortness of breath) [R06 02]  Abdominal pain [R10 9]  Pleural effusion [J90]  REMA (acute kidney injury) ([place] [address] ) [N17 9]  Severe sepsis ([place] [address] ) [A41 9, R65 20]  Acute on chronic cholecystitis [K81 2]  Non-healing wound of right lower extremity [S81 801A]    Problem List  Patient Active Problem List   Diagnosis    Obesity, unspecified obesity severity, unspecified obesity type    Type 2 diabetes mellitus with diabetic neuropathy, without long-term current use of insulin ([facility])    Hydrocele    Chronic anticoagulation    Anemia    Paroxysmal atrial fibrillation ([facility])    S/P AVR    PVD (peripheral vascular disease) ([facility])    Thrombophlebitis    Presence of implantable cardioverter-defibrillator (ICD)    Edema    Venous ulcer of ankle, right ([facility])    Peripheral venous insufficiency    Ventricular tachycardia    Secondary lymphedema    CKD (chronic kidney disease) stage 3, GFR 30-59 ml/min ([facility])    Chronic diastolic CHF (congestive heart failure) ([facility])    OBINNA (obstructive sleep apnea)    Benign prostatic hyperplasia with lower urinary tract symptoms    Acute cholecystitis    Acute kidney injury ([place] [address] )    Cardiomegaly    Chronic bilateral low back pain without sciatica    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    Dyslipidemia    Endocarditis    Mixed hyperlipidemia    Osteoarthritis, knee    Renal cyst    Status post right knee replacement    Swelling of limb    BPH (benign prostatic hyperplasia)    Chronic deep vein thrombosis (DVT) ([facility])    Acute on chronic cholecystitis    Cholecystostomy care ([facility])    Gout    Calculus of gallbladder    Extrahepatic biloma    Pneumonia of both lungs due to infectious organism    Acute hypoxemic respiratory failure ([place] [address] ) COVID-19    Pleural effusion on right       Past Medical History  Past Medical History:   Diagnosis Date    Anemia     Arthritis     Atrial fibrillation ([facility] )     Basal cell carcinoma 03/22/2022    Tip of Nose    CHF (congestive heart failure) (HCC)     Diabetes mellitus ([facility] )     Niddm    DVT (deep vein thrombosis) in pregnancy 1966    not in pregnancy    DVT (deep venous thrombosis) ([facility] ) 1966    Dyslipidemia     Encephalopathy acute 11/4/2022    GERD (gastroesophageal reflux disease)     Hyperlipidemia     Hypertension     Hypomagnesemia 10/19/2022    Irregular heart beat     Afib    Morbid obesity due to excess calories ([facility] )     Resolved 9/2/2014     Pulmonary embolism (HCC)     Sepsis (HCC)     Squamous cell skin cancer 07/30/2020    Left posterior scalp    Visual impairment        Past Surgical History  Past Surgical History:   Procedure Laterality Date    AORTIC VALVE REPLACEMENT      CARDIAC DEFIBRILLATOR PLACEMENT  04/2014    CARDIAC SURGERY  02/2014    AVR    COLONOSCOPY      INSERT / REPLACE / REMOVE PACEMAKER      IR ASPIRATION BAKERS CYST  3/8/2023    IR CHOLECYSTOSTOMY TUBE CHECK/CHANGE/REPOSITION/REINSERTION/UPSIZE  10/13/2022    IR CHOLECYSTOSTOMY TUBE CHECK/CHANGE/REPOSITION/REINSERTION/UPSIZE  10/19/2022    IR CHOLECYSTOSTOMY TUBE CHECK/CHANGE/REPOSITION/REINSERTION/UPSIZE  10/27/2022    IR CHOLECYSTOSTOMY TUBE CHECK/CHANGE/REPOSITION/REINSERTION/UPSIZE  11/7/2022    IR CHOLECYSTOSTOMY TUBE CHECK/CHANGE/REPOSITION/REINSERTION/UPSIZE  11/10/2022    IR CHOLECYSTOSTOMY TUBE CHECK/CHANGE/REPOSITION/REINSERTION/UPSIZE  12/1/2022    IR CHOLECYSTOSTOMY TUBE CHECK/CHANGE/REPOSITION/REINSERTION/UPSIZE  1/6/2023    IR CHOLECYSTOSTOMY TUBE CHECK/CHANGE/REPOSITION/REINSERTION/UPSIZE  1/24/2023    IR CHOLECYSTOSTOMY TUBE CHECK/CHANGE/REPOSITION/REINSERTION/UPSIZE  3/15/2023    IR CHOLECYSTOSTOMY TUBE PLACEMENT  9/1/2022    IR DRAINAGE TUBE CHECK AND/OR REMOVAL  3/22/2023    JOINT REPLACEMENT Left     LTKR MOHS SURGERY  07/30/2020    Left posterior scalp, Dr Makenna Vasquez  04/18/2022    [address] Tip of Nose- Dr Berry Fernández DIVJ&STRIP LONG SAPH [place] Right 8/17/2018    Procedure: LEG PERFORATED INJECTION SCLEROTHERAPY;  Surgeon: Tiesha Andrade MD;  Location: [facility]  MAIN OR;  Service: Vascular    REPLACEMENT TOTAL KNEE Right     TOTAL KNEE ARTHROPLASTY Left     VASCULAR SURGERY      VENA CAVA FILTER PLACEMENT      Interruption inferior vena cava, Josue filter, placement    WISDOM TOOTH EXTRACTION        04/06/23 1210   PT Last Visit   PT Visit Date 04/06/23   Note Type   Note type Evaluation   Pain Assessment   Pain Assessment Tool 0-10   Pain Score No Pain   Restrictions/Precautions   Weight Bearing Precautions Per Order No   Other Precautions Contact/isolation; Airborne/isolation;Droplet precautions; Chair Alarm; Bed Alarm;Multiple lines;Telemetry; Fall Risk   Home Living   Type of [address] Two level;Performs ADLs on one level; Able to live on main level with bedroom/bathroom   Home Equipment Walker;Cane  (rollator)   Additional Comments pt notes utilizing rollator for longer distances   Prior Function   Lives With Spouse;Daughter  (daughter lives on top floor of bi level)   Receives Help From [facility] in the last 6 months 0   Vocational Full time employment  ()   Comments pt dtr notes recently pt has become confused recently   General   Family/Caregiver Present Yes  (wife, pt dtr exiting room)   Cognition   Orientation Level Oriented to person;Oriented to place;Oriented to time  (takes longer time to recall place,)   Following Commands Follows multistep commands with increased time or repetition   Comments pt ID by wristband, name andDOB   Subjective   Subjective pt up in recliner, agreeable to PT eval   RLE Assessment   RLE Assessment WFL   Strength RLE   RLE Overall Strength 4/5   LLE Assessment   LLE Assessment WFL   Strength LLE   LLE Overall Strength 4/5 Vision-Basic Assessment   Current Vision Wears glasses all the time   Bed Mobility   Additional Comments pt up in recliner at start and end of session   Transfers   Sit to Stand 5  Supervision   Additional items Assist x 1; Increased time required;Verbal cues   Stand to Sit 5  Supervision   Additional items Assist x 1; Increased time required;Verbal cues   Toilet transfer 5  Supervision   Additional items Assist x 1; Increased time required   Additional Comments increased time, and min verbal cues for performance of transfers   Ambulation/Elevation   Gait pattern Narrow ROWENA; Forward Flexion; Short stride   Gait Assistance 5  Supervision   Additional items Assist x 1;Verbal cues  (CGA for safety, cues for RW management)   Assistive Device Rolling walker   Distance 25 ft x 1, 10 ft x 1   Stair Management Assistance Not tested  (due to patient fatigue)   Ambulation/Elevation Additional Comments patient rates ambulation as a 5/10 on modified emelia RPE scale   Balance   Static Sitting Fair +   Dynamic Sitting Fair   Static Standing Fair -   Dynamic Standing Fair -   Ambulatory Poor +  (RW)   Activity Tolerance   Activity Tolerance Patient limited by fatigue   Medical Staff Made Aware care coordinated by [address] RN in room   Assessment   Prognosis Fair   Problem List Decreased strength;Decreased range of motion; Impaired balance;Decreased mobility; Decreased safety awareness; Impaired judgement;Decreased cognition;Obesity  (questionable higher level cognition)   Assessment Pt is a 81 y o  male seen for PT evaluation s/p admit to [facility] on 4/4/2023  Pt was admitted with a primary dx of: acute respiratory failure, CHF, SOB, Pleural effusion, REMA, severe sepsis, pneumonia  PT now consulted for assessment of mobility and d/c needs  Pt with Activity as tolerated orders  Pts current comorbidities and personal factors effecting treatment include: BMi, DM II, PVD, CKD, OA, GERD, COVID-19   Pts current clinical presentation is Unstable/Unpredictable (high complexity) due to Ongoing medical management for primary dx, Decreased activity tolerance compared to baseline, Fall risk, Increased assistance needed from caregiver at current time, Ongoing telemetry monitoring  Prior to admission, pt was independent with use of RW  Upon evaluation, pt currently is requiring ; Supervision for transfers and Supervision for ambulation 25 ft w/ RW  Pt presents at PT eval functioning below baseline and currently w/ overall mobility deficits 2* to: BLE weakness, decreased ROM, impaired balance, decreased endurance, impaired coordination, gait deviations, decreased activity tolerance compared to baseline, decreased functional mobility tolerance compared to baseline, altered sensation, decreased safety awareness, impaired judgement, fall risk, SOB upon exertion, decreased skin integrity  Pt currently at a fall risk 2* to impairments listed above  Pt will continue to benefit from skilled acute PT interventions to address stated impairments; to maximize functional mobility; for ongoing pt/ family training; and DME needs  At conclusion of PT session pt returned back in chair, chair alarm engaged, RN notified of session findings/recommendations and pt returned back in recliner chair with phone and call bell within reach  Pt denies any further questions at this time  Recommend home with HHPT upon hospital D/C  Goals   Patient Goals to prepare for weekend services   STG Expiration Date 04/16/23   Short Term Goal #1 In 10 days pt will be able to: 1  Demonstrate ability to perform all aspects of bed mobility independently to improve functional safety  2  Perform functional transfers independently to facilitate safe return to previous living environment  3   Ambulate 150 ft with LRAD independently with stable vitals to improve safety with household distances and reduce fall risk    4  Improve LE strength grades by 1 to increase ease of functional mobility with transfers and gait  5  Pt will demonstrate improved balance by one grade in order to decrease risk of falls  6  Climb 5 steps with 1 HR independently to simulate entrance to home  PT Treatment Day 0   Plan   Treatment/Interventions Functional transfer training;LE strengthening/ROM; Therapeutic exercise; Endurance training;Patient/family training;Equipment eval/education; Bed mobility;Gait training;Spoke to nursing;OT   PT Frequency 3-5x/wk   Recommendation   PT Discharge Recommendation Home with home health rehabilitation   Equipment Recommended [address] Mobility Inpatient   Turning in Flat Bed Without Bedrails 2   Lying on Back to Sitting on Edge of Flat Bed Without Bedrails 2   Moving Bed to Chair 3   Standing Up From Chair Using Arms 3   Walk in Room 3   Climb 3-5 Stairs With Railing 2   Basic Mobility Inpatient Raw Score 15   Basic Mobility Standardized Score 36 97   Highest Level Of Mobility   -HLM Goal 4: Move to chair/commode   JH-HLM Achieved 6: Walk 10 steps or more   End of Consult   Patient Position at End of Consult Bedside chair; All needs within reach  (OT Jorgeee performing Cog portion of eval)   The patient's AM-PAC Basic Mobility Inpatient Short Form Raw Score is 15   A Raw score of less than or equal to 16 suggests the patient may benefit from discharge to post-acute rehabilitation services  However, Please also refer to the recommendation of the Physical Therapist for safe discharge planning              Georgia Hernandez, PT, DPT,

## 2023-04-06 NOTE — BRIEF OP NOTE (RAD/CATH)
INTERVENTIONAL RADIOLOGY PROCEDURE NOTE    Date: 4/6/2023    Procedure:   Procedure Summary     Date:  Room / Location:     Anesthesia Start:  Anesthesia Stop:     Procedure:  Diagnosis:     Scheduled Providers:  Responsible Provider:     Anesthesia Type: Not recorded ASA Status: Not recorded          Preoperative diagnosis:   1  Severe sepsis ([facility] )    2  Acute respiratory failure ([facility] )    3  Pneumonia    4  Acute on chronic cholecystitis    5  Pleural effusion    6  Hypomagnesemia    7  Lactic acidosis    8  CHF (congestive heart failure) ([facility] )    9  Non-healing wound of right lower extremity    10  RMEA (acute kidney injury) ([facility] )    11  Abdominal pain    12  Pneumonia of both lungs due to infectious organism, unspecified part of lung         Postoperative diagnosis: Same  Surgeon: Laura De Luna MD     Assistant: None  No qualified resident was available  Blood loss: Minimal    Specimens: 20cc serous / clear mora     Findings:     US guided 10F drain placement within a perihepatic fluid collection  Serous fluid obtained  If cultures are negative, would recommend short interval drain removal      Discussed with Dr Yudi Ovalle from surgery  Complications: None immediate       Anesthesia: local

## 2023-04-06 NOTE — PROGRESS NOTES
[facility]  Transfer/Progress Note  Name: Frank Aranda  MRN: [id number]  Unit/Bed#: ICU 02 I Date of Admission: 4/4/2023   Date of Service: 4/6/2023 I Hospital Day: 1    Assessment/Plan   Pleural effusion on right  Assessment & Plan  · Amador snot appear large enough to drain at this time  · CTM and if resp status not improving would consider thoracentesis  · Supportive care at this time    COVID-19  Assessment & Plan  · Recent family illness, daughter reports pt had negative test today prior to coming in  · Start moderate Covid pathway- start Decadron 6 mg, Remdesivir, Baricitinib  · Encourage self proning if able  · I/S while awake  · Wean O2 to keep sat > 88%    Acute hypoxemic respiratory failure (HCC)  Assessment & Plan  · Covid vs PNA  · Weaned off bipap and NC, on RA today  · Bipap and NC as needed  · Started moderate Covid pathway    Extrahepatic biloma  Assessment & Plan  · Recent perc jeny tube removed 2 weeks ago  · Large collection on CT 9 x 5 x 13 cm concerning for Biloma vs possible abscess  · Hold AC  · Continue Flagyl (day 3)  · 4/6: s/p IR drainage tube placement in right lower abdomen     Calculus of gallbladder  Assessment & Plan  · Recent perc jeny placed and then removed 2 weeks ago  · No reported issues since removal  · Wife reports pt had abd pain today but pt currently denies pain  · Still with visible stones on CT scan    Chronic deep vein thrombosis (DVT) ([facility])  Assessment & Plan  · Hold Coumadin for now  · Resume when cleared after procedures    Mixed hyperlipidemia  Assessment & Plan  · Resume home Lipitor    OBINNA (obstructive sleep apnea)  Assessment & Plan  · Reports he does not wear CPAP at home  · Supportive care    Chronic diastolic CHF (congestive heart failure) ([facility])  Assessment & Plan  Wt Readings from Last 3 Encounters:   04/06/23 (!) 147 kg (324 lb 1 2 oz)   03/08/23 (!) 137 kg (301 lb)   01/23/23 (!) 139 kg (307 lb)     · Monitor intake and output closely  · Avoid any aggressive fluids at this time  · Monitor daily weights        CKD (chronic kidney disease) stage 3, GFR 30-59 ml/min [facility])  Assessment & Plan  Lab Results   Component Value Date    EGFR 45 04/06/2023    EGFR 45 04/05/2023    EGFR 42 04/04/2023    CREATININE 1 38 (H) 04/06/2023    CREATININE 1 39 (H) 04/05/2023    CREATININE 1 46 (H) 04/04/2023     · 4/4: Creatinine 1 46, last check 0 95 Nov of 2022  · 4/6: Creatinine 1 38  · Avoid nephrotoxic meds, contrast, monitor for REMA    Paroxysmal atrial fibrillation (HCC)  Assessment & Plan  · Hold Coumadin for now  · Appears to be in regular rhythm at present  · Rate controlled w/ Sotalol 80 mg daily (home med)    Chronic anticoagulation  Assessment & Plan  · Hold Coumadin until further notice  · S/p Kcentra 1500 mg x 2, vitamin K  · 4/6 INR: 1 57    Type 2 diabetes mellitus with diabetic neuropathy, without long-term current use of insulin [facility])  Assessment & Plan  Lab Results   Component Value Date    HGBA1C 6 3 (H) 03/01/2022       Recent Labs     04/05/23  1717 04/06/23  0004 04/06/23  0612 04/06/23  1149   POCGLU 153* 133 151* 152*       Blood Sugar Average: Last 72 hrs:  (P) 135 [id number]   · Start SSI humalog    * Pneumonia of both lungs due to infectious organism  Assessment & Plan  · As seen on CT chest 4/4- significant infiltrate vs GGO LLE  · Now on room air  · Given Ceftriaxone and Azithro in ED  · Given recent admission on 3/8, abx changed to Cefepime, Vancomycin (both abx day 2)  · 4/6: May be secondary to Covid- start Moderate pathway: Decadron, baricitinib, remdesivir day 2  · Pending sputum culture, MRSA, Legionella, strep    · Stop vancomycin if MRSA culture (-)  · Preliminary BC's negative  · Encourage I/S-currently pulling 250-500 on IS  · Keep HOB elevated         ----------------------------------------------------------------------------------------------------------------------    Code Status: Level 1 - Full Code    Reason for ICU/Stepdown Admission:   No longer has ICU needs    Consultants:  General surgery, IR, PT/OT, case management, ICU    HPI:   HPI from initial H&P written by Dr Geovani Booker (general surgery resident): Ritesh Caicedo is a 81 y o  male with history of A-fib on warfarin, CHF, DVT/PE, Bioprosthetic AV replacement for endocarditis, ICD for VT, PVD, DM, chronic nonhealing foot wound, CKD III, OBINNA, anemia, acute on chronic cholecystitis s/p multiple perc jeny tubes,  perc gallstones extraction and lithotripsy, biliary colonic fistula, who presents with respiratory distress  Patient presents to the ER on 4/4 with worsening TATE, which has grown worse over the past 1 to 2 weeks, reaching its peak today  Patient had associated symptoms of a persistent cough, nausea and vomiting, and at least 1 week of dark black stools  Per patient and family, the entire family had COVID within the last 1 to 2 weeks, however despite having all the symptoms, the patient did not test himself  The family describes him as being disoriented with difficulty speaking in between breaths  In addition patient has a long history of chronic cholecystitis requiring nonoperative management given his multiple comorbidities  Patient had his last IR procedure on 3/22, which included percutaneous extraction of stones, as well as lithotripsy and removal of his cholecystostomy tube      On presentation today (4/4/23) he is febrile to 101 9 and hypotensive to the 90s /50s, and tachypneic requiring the use of BiPAP with settings of 18/10 on 35% FiO2  His abdomen is soft on exam with minimal LLQ tenderness  CT scan shows bilateral pleural effusions with multifocal bilateral pneumonia  Scan also shows a contracted gallbladder with chronic cholecystitis with partially loculated fluid around the right colon and right hepatic lobe measuring 9 4 x 5 x 13 cm    Fluid collection on CT concerning for a possible bile leak, while being unable to exclude a right colonic perforation  Summary of clinical course:  -Presented to the ED on 4/4/23, admitted to ICU due to BiPAP requirements  -4/4 CT chest/ab/pelvis with contrast:    1  Moderate right pleural effusion is slightly increased since the prior study  New  trace left pleural effusion is also seen  2   New lower lobe predominant pulmonary infiltrates bilaterally worse in the left  lower lobe suggesting multifocal pneumonia  3   Likely reactive enlarged mediastinal/hilar lymph nodes measuring up to 2 2 x 1 6  cm     4   Interval removal of percutaneous cholecystostomy tube  Gallbladder is  contracted containing small stones demonstrated mild diffuse wall thickening,  similar compared to the prior exam   Findings may indicate chronic cholecystitis  5   There is an irregular partially loculated fluid collection surrounding the cecum,  ascending colon, and inferior right hepatic lobe measuring approximately 9 4 x 5 1  cm in axial dimension and 13 cm in craniocaudal dimension  This could be from  bile leak given interval cholecystostomy tube placement and removal   Alternatively,  cannot exclude contained perforation of the right colon without pericolonic abscess  Clinical correlation recommended  6   No evidence for bowel obstruction,  obstructive uropathy, or free air  7   Small umbilical hernia containing mesenteric fat and trace ascites noted  8   Subcutaneous stranding in the lower abdominal/pelvic wall and bilateral flank  regions could be due to edema or cellulitis  -4/4 CXR: Airspace disease at lung bases, bilateral pleural effusions   -IV abx for multifocal PNA started  Pt received Rocephin and Azithromycin in ED  -When pt arrived to ICU, abx switched to cefepime and vancomycin for PNA as pt had recent hospital admission on 3/8 thus we broadened abx    -Pt started on moderate COVID pathway (Decadron, Baricitinib, Remdesivir)  -Flagyl added for biloma vs  Abdominal abscess  -4/6: S/p IR drainage tube placement in right lower abdomen   -4/6: transfer [facility] to [facility] with [facility] as primary team, general surgery states they will follow pt from the periphery to monitor his IR drain, but pt no longer has surgical needs and can be transferred to [facility]  Please refer to today's progress note for further clinical details    Recent procedures:  4/6: IR drainage tube placement in right abdomen    Outstanding or pending diagnostics/cultures/procedures:  -Sputum culture and gram stain  -Blood culture x2  -Legionella Ag urine  -Strep pneumonia urine  -MRSA culture  -For IR from fluid collection: anarobic culture and gram stain, body fluid culture and gram stain, body bilirubin   -4/5 CXR official read by radiology   -Heparin-ACS Low as per COVID pathway for DVT ppx; this should be ordered in conjunction with IR when pt is cleared after procedure    -Can start cardiac diet now that IR procedure is done, pt no longer needs to be NPO    Mobilization Plan:  Up and out of bed as tolerated, work with PT and OT    LDA's and reason for remaining devices: Invasive Devices     Peripheral Intravenous Line  Duration           Peripheral IV 04/04/23 Right;Ventral (anterior) Forearm 1 day    Peripheral IV 04/06/23 Left;Proximal;Ventral (anterior) Forearm <1 day              Discharge Plan:  Discharge plan per primary team     Family aware of transfer out of critical care? Yes, at bedside    Home medications not resumed and why:  Coumadin (supratherapeutic INR, S/p IR procedure, plan to resume after procedure)    Spoke with Dr Jeromy Whitfield, Dr Martha Ramos Hunger attendings), Dr Blanco Simmons regarding transfer to the med-surg service with Dr Miguel Plascencia as primary team at [facility]

## 2023-04-06 NOTE — ASSESSMENT & PLAN NOTE
Lab Results   Component Value Date    HGBA1C 6 3 (H) 03/01/2022       Recent Labs     04/05/23  1152 04/05/23  1717 04/06/23  0004 04/06/23  0612   POCGLU 143* 153* 133 151*       Blood Sugar Average: Last 72 hrs:  (P) 132 5   · Start SSI humalog

## 2023-04-06 NOTE — PLAN OF CARE
Problem: PAIN - ADULT  Goal: Verbalizes/displays adequate comfort level or baseline comfort level  Description: Interventions:  - Encourage patient to monitor pain and request assistance  - Assess pain using appropriate pain scale  - Administer analgesics based on type and severity of pain and evaluate response  - Implement non-pharmacological measures as appropriate and evaluate response  - Consider cultural and social influences on pain and pain management  - Notify physician/advanced practitioner if interventions unsuccessful or patient reports new pain  Outcome: Progressing     Problem: INFECTION - ADULT  Goal: Absence or prevention of progression during hospitalization  Description: INTERVENTIONS:  - Assess and monitor for signs and symptoms of infection  - Monitor lab/diagnostic results  - Monitor all insertion sites, i e  indwelling lines, tubes, and drains  - Monitor endotracheal if appropriate and nasal secretions for changes in amount and color  - [place] appropriate cooling/warming therapies per order  - Administer medications as ordered  - Instruct and encourage patient and family to use good hand hygiene technique  - Identify and instruct in appropriate isolation precautions for identified infection/condition  Outcome: Progressing     Problem: SAFETY ADULT  Goal: Maintain or return to baseline ADL function  Description: INTERVENTIONS:  -  Assess patient's ability to carry out ADLs; assess patient's baseline for ADL function and identify physical deficits which impact ability to perform ADLs (bathing, care of mouth/teeth, toileting, grooming, dressing, etc )  - Assess/evaluate cause of self-care deficits   - Assess range of motion  - Assess patient's mobility; develop plan if impaired  - Assess patient's need for assistive devices and provide as appropriate  - Encourage maximum independence but intervene and supervise when necessary  - Involve family in performance of ADLs  - Assess for home care needs following discharge   - Consider OT consult to assist with ADL evaluation and planning for discharge  - Provide patient education as appropriate  Outcome: Progressing     Problem: DISCHARGE PLANNING  Goal: Discharge to home or other facility with appropriate resources  Description: INTERVENTIONS:  - Identify barriers to discharge w/patient and caregiver  - Arrange for needed discharge resources and transportation as appropriate  - Identify discharge learning needs (meds, wound care, etc )  - Arrange for interpretive services to assist at discharge as needed  - Refer to Case Management Department for coordinating discharge planning if the patient needs post-hospital services based on physician/advanced practitioner order or complex needs related to functional status, cognitive ability, or social support system  Outcome: Progressing     Problem: Knowledge Deficit  Goal: Patient/family/caregiver demonstrates understanding of disease process, treatment plan, medications, and discharge instructions  Description: Complete learning assessment and assess knowledge base  Interventions:  - Provide teaching at level of understanding  - Provide teaching via preferred learning methods  Outcome: Progressing     Problem: Nutrition/Hydration-ADULT  Goal: Nutrient/Hydration intake appropriate for improving, restoring or maintaining nutritional needs  Description: Monitor and assess patient's nutrition/hydration status for malnutrition  Collaborate with interdisciplinary team and initiate plan and interventions as ordered  Monitor patient's weight and dietary intake as ordered or per policy  Utilize nutrition screening tool and intervene as necessary  Determine patient's food preferences and provide high-protein, high-caloric foods as appropriate       INTERVENTIONS:  - Monitor oral intake, urinary output, labs, and treatment plans  - Assess nutrition and hydration status and recommend course of action  - Evaluate amount of meals eaten  - Assist patient with eating if necessary   - Allow adequate time for meals  - Recommend/ encourage appropriate diets, oral nutritional supplements, and vitamin/mineral supplements  - Order, calculate, and assess calorie counts as needed  - Recommend, monitor, and adjust tube feedings and TPN/PPN based on assessed needs  - Assess need for intravenous fluids  - Provide specific nutrition/hydration education as appropriate  - Include patient/family/caregiver in decisions related to nutrition  Outcome: Progressing     Problem: Prexisting or High Potential for Compromised Skin Integrity  Goal: Skin integrity is maintained or improved  Description: INTERVENTIONS:  - Identify patients at risk for skin breakdown  - Assess and monitor skin integrity  - Assess and monitor nutrition and hydration status  - Monitor labs   - Assess for incontinence   - Turn and reposition patient  - Assist with mobility/ambulation  - Relieve pressure over bony prominences  - Avoid friction and shearing  - Provide appropriate hygiene as needed including keeping skin clean and dry  - Evaluate need for skin moisturizer/barrier cream  - Collaborate with interdisciplinary team   - Patient/family teaching  - Consider wound care consult   Outcome: Progressing

## 2023-04-06 NOTE — PROGRESS NOTES
[facility]  Progress Note  Name: Swati Donaldson  MRN: [id number]  Unit/Bed#: ICU 02 I Date of Admission: 4/4/2023   Date of Service: 4/6/2023 I Hospital Day: 1    Assessment/Plan   Pleural effusion on right  Assessment & Plan  · [place] snot appear large enough to drain at this time  · CTM and if resp status not improving would consider thoracentesis  · Supportive care at this time    COVID-19  Assessment & Plan  · Recent family illness, daughter reports pt had negative test today prior to coming in  · Start moderate Covid pathway- start Decadron 6 mg, Remdesivir, Baricitinib  · Encourage self proning if able  · I/S while awake  · Wean O2 to keep sat > 88%    Acute hypoxemic respiratory failure (HCC)  Assessment & Plan  · Covid vs PNA  · Weaned off bipap and NC, on RA today  · Bipap and NC as needed  · Started moderate Covid pathway    Extrahepatic biloma  Assessment & Plan  · Recent perc jeny tube removed 2 weeks ago  · Large collection on CT 9 x 5 x 13 cm  · Pt denies any c/o abd pain at present but wife reports he c/o abd pain earlier today  · Biloma vs possible abscess  · Hold anticoagulation as pt will need drainage today  · Unable to palpate collection at this time  · Reverse current INR w/ [address] and Vit K  · IR plans to take pt for procedure when INR < 1 5     Calculus of gallbladder  Assessment & Plan  · Recent perc jeny placed and then removed 2 weeks ago  · No reported issues since removal  · Wife reports pt had abd pain today but pt currently denies pain  · Still with visible stones on CT scan    Chronic deep vein thrombosis (DVT) (HCC)  Assessment & Plan  · Hold Coumadin for now  · Resume when cleared after procedures    Mixed hyperlipidemia  Assessment & Plan  · Resume home Lipitor    OBINNA (obstructive sleep apnea)  Assessment & Plan  · Reports he does not wear CPAP at home  · Supportive care    Chronic diastolic CHF (congestive heart failure) ([facility] [address] )  Assessment & Plan  Wt Readings from Last 3 Encounters:   04/06/23 (!) 147 kg (324 lb 1 2 oz)   03/08/23 (!) 137 kg (301 lb)   01/23/23 (!) 139 kg (307 lb)     · Monitor intake and output closely  · Avoid any aggressive fluids at this time  · Monitor daily weights        CKD (chronic kidney disease) stage 3, GFR 30-59 ml/min [facility])  Assessment & Plan  Lab Results   Component Value Date    EGFR 45 04/06/2023    EGFR 45 04/05/2023    EGFR 42 04/04/2023    CREATININE 1 38 (H) 04/06/2023    CREATININE 1 39 (H) 04/05/2023    CREATININE 1 46 (H) 04/04/2023     · 4/4: Creatinine 1 46, last check 0 95 Nov of 2022  · 4/6: Creatinine 1 38  · Avoid nephrotoxic meds, contrast, monitor for REMA    Paroxysmal atrial fibrillation (HCC)  Assessment & Plan  · Hold Coumadin for now  · Appears to be in regular rhythm at present  · Rate controlled w/ Sotalol 80 mg daily (home med)    Chronic anticoagulation  Assessment & Plan  · Hold Coumadin until further notice  · S/p Kcentra 1500 mg x 2, vitamin K  · 4/6 INR: 1 57    Type 2 diabetes mellitus with diabetic neuropathy, without long-term current use of insulin [facility])  Assessment & Plan  Lab Results   Component Value Date    HGBA1C 6 3 (H) 03/01/2022       Recent Labs     04/05/23  1152 04/05/23  1717 04/06/23  0004 04/06/23  0612   POCGLU 143* 153* 133 151*       Blood Sugar Average: Last 72 hrs:  (P) 132 5   · Start SSI humalog    * Pneumonia of both lungs due to infectious organism  Assessment & Plan  · As seen on CT chest 4/4- significant infiltrate vs GGO LLE  · Now on room air  · Given Ceftriaxone and Azithro in ED  · Given recent admission on 3/8, abx changed to Cefepime, Vancomycin  · 4/6: May be secondary to Covid- start Moderate pathway: Decadron, baricitinib, remdesivir day 2  · Pending sputum culture, MRSA, Legionella, strep    · Preliminary BC's negative  · Encourage I/S-currently pulling 250-500 on IS  · Keep HOB elevated ----------------------------------------------------------------------------------------  HPI/24hr events: No acute events overnight, consider transfer to med surg today as pt has no critical care needs    Disposition: Transfer to Med-Surg   Code Status: Level 1 - Full Code  ---------------------------------------------------------------------------------------  SUBJECTIVE  Pt still feels dyspnea at rest, pulling 250-500 on IS, currently on RA satting 95% without tachypnea  Review of Systems   Constitutional: Negative for chills and fever  HENT: Negative for ear pain and sore throat  Eyes: Negative for pain and visual disturbance  Respiratory: Positive for shortness of breath  Negative for cough  Cardiovascular: Negative for chest pain and palpitations  Gastrointestinal: Negative for abdominal pain and vomiting  Genitourinary: Negative for dysuria and hematuria  Musculoskeletal: Negative for arthralgias and back pain  Skin: Negative for color change and rash  Neurological: Negative for seizures and syncope  All other systems reviewed and are negative      Review of systems was reviewed and negative unless stated above in HPI/24-hour events   ---------------------------------------------------------------------------------------  OBJECTIVE    Vitals   Vitals:    23 0252 23 0300 23 0600 23 0747   BP: 118/59 118/59  147/93   BP Location: Left arm      Pulse:  79  83   Resp:  (!) 37  (!) 30   Temp: 97 7 °F (36 5 °C)   97 7 °F (36 5 °C)   TempSrc: Oral   Oral   SpO2:  96%  96%   Weight: (!) 147 kg (324 lb 1 2 oz)  (!) 147 kg (324 lb 1 2 oz)    Height:         Temp (24hrs), Av 6 °F (36 4 °C), Min:97 3 °F (36 3 °C), Max:97 9 °F (36 6 °C)  Current: Temperature: 97 7 °F (36 5 °C)          Respiratory:  SpO2: SpO2: 96 %  Nasal Cannula O2 Flow Rate (L/min): 2 L/min    Invasive/non-invasive ventilation settings   Respiratory    Lab Data (Last 4 hours)    None O2/Vent Data (Last 4 hours)    None                Physical Exam  Vitals and nursing note reviewed  Constitutional:       General: He is not in acute distress  Appearance: He is well-developed  He is obese  HENT:      Head: Normocephalic and atraumatic  Eyes:      Conjunctiva/sclera: Conjunctivae normal    Cardiovascular:      Rate and Rhythm: Normal rate and regular rhythm  Pulses: Normal pulses  Radial pulses are 2+ on the right side and 2+ on the left side  Heart sounds: Normal heart sounds  No murmur heard  Comments: Bilateral lower extremity venous insufficieny  RLE: wound wrapped   Pulmonary:      Effort: Pulmonary effort is normal  No respiratory distress  Breath sounds: Normal breath sounds and air entry  Abdominal:      Palpations: Abdomen is soft  Tenderness: There is no abdominal tenderness  Comments: Soft nontender nonperitoneal    Musculoskeletal:         General: No swelling  Cervical back: Neck supple  Right lower le+ Pitting Edema present  Left lower le+ Pitting Edema present  Skin:     General: Skin is warm and dry  Capillary Refill: Capillary refill takes less than 2 seconds  Neurological:      Mental Status: He is alert and oriented to person, place, and time  GCS: GCS eye subscore is 4  GCS verbal subscore is 5  GCS motor subscore is 6     Psychiatric:         Mood and Affect: Mood normal                Laboratory and Diagnostics:  Results from last 7 days   Lab Units 23  04423  1905 23  1856   WBC Thousand/uL 20 01* 21 66*  --  13 85*   HEMOGLOBIN g/dL 9 1* 8 5*  --  10 5*   I STAT HEMOGLOBIN g/dl  --   --  10 9*  --    HEMATOCRIT % 30 2* 27 9*  --  34 8*   HEMATOCRIT, ISTAT %  --   --  32*  --    PLATELETS Thousands/uL 246 234  --  324   NEUTROS PCT %  --  88*  --  87*   MONOS PCT %  --  6  --  4     Results from last 7 days   Lab Units 23  04423  045 "04/04/23  1905 04/04/23  1856   SODIUM mmol/L 139 138  --  138   POTASSIUM mmol/L 4 4 4 4  --  4 4   CHLORIDE mmol/L 106 106  --  103   CO2 mmol/L 23 23  --  24   CO2, I-STAT mmol/L  --   --  23  --    ANION GAP mmol/L 10 9  --  11   BUN mg/dL 28* 22  --  20   CREATININE mg/dL 1 38* 1 39*  --  1 46*   CALCIUM mg/dL 8 6 8 3*  --  9 2   GLUCOSE RANDOM mg/dL 133 101  --  147*   ALT U/L  --  11  --  16   AST U/L  --  22  --  29   ALK PHOS U/L  --  66  --  103   ALBUMIN g/dL  --  3 0*  --  3 8   TOTAL BILIRUBIN mg/dL  --  2 31*  --  2 41*     Results from last 7 days   Lab Units 04/06/23 0441 04/05/23 0454 04/04/23  1856   MAGNESIUM mg/dL 2 2 2 0 1 7*   PHOSPHORUS mg/dL 3 8 3 6  --       Results from last 7 days   Lab Units 04/06/23  0441 04/05/23  1526 04/05/23  1226 04/05/23  0454 04/04/23  1856   INR  1 57* 1 60* 1 93* 2 10* 4 80*   PTT seconds  --   --   --   --  53*          Results from last 7 days   Lab Units 04/05/23 0454 04/04/23  2138 04/04/23  1856   LACTIC ACID mmol/L 1 6 2 3* 2 4*     ABG:    VBG:    Results from last 7 days   Lab Units 04/06/23 0441 04/04/23  1856   PROCALCITONIN ng/ml 3 48* 0 05       Micro  Results from last 7 days   Lab Units 04/04/23  1856   BLOOD CULTURE  No Growth at 24 hrs  No Growth at 24 hrs  EKG: Complete RBBB, NSR with PVC's  Imaging: I have personally reviewed pertinent reports  Intake and Output  I/O       04/04 0701 04/05 0700 04/05 0701 04/06 0700 04/06 0701 04/07 0700    I V  (mL/kg) 491 7 (3 4) 949 2 (6 5)     IV Piggyback 2140 770     Total Intake(mL/kg) 2631 7 (18 1) 1719 2 (11 7)     Urine (mL/kg/hr)  244 (0 1)     Total Output  244     Net +2631 7 +1475 2            Unmeasured Urine Occurrence  2 x           Height and Weights   Height: 6' 1\" (185 4 cm)  IBW (Ideal Body Weight): 79 9 kg  Body mass index is 42 76 kg/m²    Weight (last 2 days)     Date/Time Weight    04/06/23 0600 147 (324 08)    04/06/23 0252 147 (324 08)    04/05/23 0202 145 (319 89) " Nutrition       Diet Orders   (From admission, onward)             Start     Ordered    04/06/23 0000  Diet NPO  Diet effective now        References:    Nutrtion Support Algorithm Enteral vs  Parenteral   Question Answer Comment   Diet Type NPO    RD to adjust diet per protocol?  Yes        04/05/23 1503                  Active Medications  Scheduled Meds:  Current Facility-Administered Medications   Medication Dose Route Frequency Provider Last Rate   • acetaminophen  650 mg Rectal Once Guerreromagdalena Duff MD     • albuterol  2 puff Inhalation Q6H PRN Juvenal Lane MD     • baricitinib  2 mg Oral Q24H Sabrina Leisure, CRMAR     • benzonatate  100 mg Oral TID PRN Sabrina Leisure, CRNP     • cefepime  2,000 mg Intravenous Q12H Sabrina Leisure, CRNP 2,000 mg (04/06/23 0746)   • dexamethasone  6 mg Intravenous Q24H Sabrina Leisure, CRMAR     • famotidine  20 mg Intravenous Q12H [facility] ODESSA Sesay     • heparin (porcine)  5,000 Units Subcutaneous Q8H [facility] Brennon Ndiaye MD     • insulin lispro  2-12 Units Subcutaneous Q6H [facility] Juvenal Lane MD     • metroNIDAZOLE  500 mg Intravenous Q8H Guerreromagdalena Duff  mg (04/06/23 0433)   • mirtazapine  15 mg Oral HS Juvenal Lane MD     • nystatin   Topical BID Julio Anderson MD     • remdesivir  100 mg Intravenous Q24H Sabrina Leisure, ODESSA     • senna-docusate sodium  1 tablet Oral HS ODESSA Sesay     • sotalol  80 mg Oral Daily ODESSA Sesay     • vancomycin  10 mg/kg (Adjusted) Intravenous Q24H Sabrina Britt, ODESSA       Facility-Administered Medications Ordered in Other Encounters   Medication Dose Route Frequency Provider Last Rate   • cefTRIAXone  2,000 mg Intravenous Once Sonja Burks MD     • sodium chloride  75 mL/hr Intravenous Continuous Sonja Burks MD 75 mL/hr (03/08/23 1000)     Continuous Infusions:     PRN Meds:   albuterol, 2 puff, Q6H PRN  benzonatate, 100 mg, TID "PRN        Invasive Devices Review  Invasive Devices     Peripheral Intravenous Line  Duration           Peripheral IV 04/04/23 Right;Ventral (anterior) Forearm 1 day    Peripheral IV 04/06/23 Left;Proximal;Ventral (anterior) Forearm <1 day                  ---------------------------------------------------------------------------------------  Advance Directive and Living Will:      Power of :    POLST:    ---------------------------------------------------------------------------------------  Care Time Delivered:   No Critical Care time spent       Lonnie Melgoza MD      Portions of the record may have been created with voice recognition software  Occasional wrong word or \"sound a like\" substitutions may have occurred due to the inherent limitations of voice recognition software    Read the chart carefully and recognize, using context, where substitutions have occurred  "

## 2023-04-06 NOTE — ASSESSMENT & PLAN NOTE
· As seen on CT chest 4/4- significant infiltrate vs GGO LLE  · Now on room air  · Given Ceftriaxone and Azithro in ED  · Given recent admission on 3/8, abx changed to Cefepime, Vancomycin  · 4/6: May be secondary to Covid- start Moderate pathway: Decadron, baricitinib, remdesivir day 2  · Pending sputum culture, MRSA, Legionella, strep    · Preliminary BC's negative  · Encourage I/S-currently pulling 250-500 on IS  · Keep HOB elevated

## 2023-04-06 NOTE — CASE MANAGEMENT
Case Management Assessment & Discharge Planning Note    Patient name Selin Wright  Location ICU 02/ICU 16 MRN [id number]  : 1935 Date 2023       Current Admission Date: 2023  Current Admission Diagnosis:Pneumonia of both lungs due to infectious organism   Patient Active Problem List    Diagnosis Date Noted   • Extrahepatic biloma 2023   • Pneumonia of both lungs due to infectious organism 2023   • Acute hypoxemic respiratory failure ([facility] ) 2023   • COVID-19 2023   • Pleural effusion on right 2023   • Calculus of gallbladder 2023   • Cholecystostomy care ([facility] ) 2022   • Gout 2022   • Acute on chronic cholecystitis 2022   • Acute cholecystitis 2022   • Benign prostatic hyperplasia with lower urinary tract symptoms 2022   • OBINNA (obstructive sleep apnea)    • CKD (chronic kidney disease) stage 3, GFR 30-59 ml/min ([facility] ) 2022   • Acute kidney injury ([facility] ) 2021   • Osteoarthritis, knee 11/15/2021   • Status post right knee replacement 11/15/2021   • Mixed hyperlipidemia 2021   • Swelling of limb 2021   • Chronic deep vein thrombosis (DVT) ([facility] ) 09/10/2020   • Secondary lymphedema 2018   • Venous ulcer of ankle, right ([facility] ) 2018   • S/P AVR 2018   • Thrombophlebitis 2018   • Presence of implantable cardioverter-defibrillator (ICD) 2018   • Chronic bilateral low back pain without sciatica 2017   • BPH (benign prostatic hyperplasia) 2017   • Obesity, unspecified obesity severity, unspecified obesity type 2017   • Hydrocele 2017   • Chronic anticoagulation 2017   • Anemia 2017   • Renal cyst 2017   • 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 2015   • Ventricular tachycardia 04/15/2014   • Paroxysmal atrial fibrillation ([facility] ) 2014   • Edema 2014   • Chronic diastolic CHF (congestive heart failure) ([facility] ) 03/19/2014   • Endocarditis 03/19/2014   • Type 2 diabetes mellitus with diabetic neuropathy, without long-term current use of insulin ([facility] ) 08/19/2013   • Peripheral venous insufficiency 08/19/2013   • Cardiomegaly 08/19/2013   • PVD (peripheral vascular disease) ([facility] ) 04/19/2013   • Dyslipidemia 04/19/2013      LOS (days): 1  Geometric Mean LOS (GMLOS) (days): 5 20  Days to GMLOS:3 7     OBJECTIVE:    Risk of Unplanned Readmission Score: 30 26         Current admission status: Inpatient  Referral Reason: Other (dispo planning)    Preferred Pharmacy:   [facility] [address] [place] [id number]  Phone: [phone] Fax: [phone]    Primary Care Provider: Karen Gomez DO    Primary Insurance: MEDICARE  Secondary Insurance: [employer]    ASSESSMENT:  Shana Aguirre [address], [address] [facility] Representative - Spouse   Primary Phone: [phone] (Mobile)               Advance Directives  Does patient have a [address]?: Yes  Does patient have Advance Directives?: No  Was patient offered paperwork?: Yes  Primary Contact: Patient's spouse Paloma    Readmission Root Cause  30 Day Readmission: No    Patient Information  Admitted from[de-identified] Home  Mental Status: Alert  During Assessment patient was accompanied by: Spouse  Assessment information provided by[de-identified] Spouse  Primary Caregiver: Spouse  Caregiver's Name[de-identified] [facility] Relationship to Patient[de-identified] Family Member  Support Systems: Self, Spouse/significant other  [place] of Residence: [address] [place] [address] do you live in?: [facility] entry access options  Select all that apply : Stairs  Number of steps to enter home  : 1  Do the steps have railings?: No  Type of Current Residence: Ranch  In the last 12 months, was there a time when you were not able to pay the mortgage or rent on time?: No  In the last 12 months, was there a time when you did not have a steady place to sleep or slept in a shelter (including now)?: No  Homeless/housing insecurity resource given?: No  Living Arrangements: Lives w/ Spouse/significant other    Activities of Daily Living Prior to Admission  Functional Status: Independent  Completes ADLs independently?: Yes  Ambulates independently?: Yes  Does patient use assisted devices?: Yes  Assisted Devices (DME) used: Maynor Sidhu  Does patient currently own DME?: Yes  What DME does the patient currently own?: Maynor Sidhu  Does patient have a history of Outpatient Therapy (PT/OT)?: No  Does the patient have a history of Short-Term Rehab?: Yes ([facility])  Does patient have a history of HHC?: Yes ([facility])  Does patient currently have [address]?: No    Patient Information Continued  Income Source: Pension/skilled nursing  Does patient have prescription coverage?: Yes  Food insecurity resource given?: No  Does patient receive dialysis treatments?: No  Does patient have a history of substance abuse?: No  Does patient have a history of Mental Health Diagnosis?: No    PHQ 2/9 Screening   Reviewed PHQ 2/9 Depression Screening Score?: No    Means of Transportation  Means of Transport to Appts[de-identified] Drives Self  In the past 12 months, has lack of transportation kept you from medical appointments or from getting medications?: No  In the past 12 months, has lack of transportation kept you from meetings, work, or from getting things needed for daily living?: No  Was application for public transport provided?: No    DISCHARGE DETAILS:    Discharge planning discussed with[de-identified] patient's spouse  Freedom of Choice: Yes     CM contacted family/caregiver?: Yes  Were Treatment Team discharge recommendations reviewed with patient/caregiver?: Yes  Did patient/caregiver verbalize understanding of patient care needs?: Yes  Were patient/caregiver advised of the risks associated with not following Treatment Team discharge recommendations?: Yes    Contacts  Patient Contacts: Paloma-spouse  Relationship to Patient[de-identified] Family  Contact Method: Phone  Reason/Outcome: Continuity of Care, Emergency Contact, Referral, Discharge Planning    Other Referral/Resources/Interventions Provided:  Referral Comments: PT/OT evaluation pending    CM spoke with patient's spouse Maia Rangel on the phone, [phone]  CM introduced self and role  Patient lives with his spouse in a ranch style home  Per spouse, patient independent with care at baseline  Patient uses a RW at home  No current VNA services at home  PT/OT at bedside with patient at this time     Patient is Covid vaccinated x 2 and boosted x 2  Patient's spouse Maia Rangel is primary contact  No advance directive/living will  CM will f/u with patient/spouse re:DCP  All questions/concerns answered at this time  CM reviewed discharge planning process including the following: identifying caregivers at home, preference for d/c planning needs,   availability of [facility] program, availability of treatment team to discuss questions or concerns patient and/or family may have regarding diagnosis, plan of care, old or new medications and discharge planning   CM will continue to follow for care coordination and update assessment as appropriate

## 2023-04-06 NOTE — OCCUPATIONAL THERAPY NOTE
Occupational Therapy Evaluation     Patient Name: Ena Morris  POCNW'J Date: 4/6/2023  Problem List  Principal Problem:    Pneumonia of both lungs due to infectious organism  Active Problems:    Type 2 diabetes mellitus with diabetic neuropathy, without long-term current use of insulin (HCC)    Chronic anticoagulation    Paroxysmal atrial fibrillation (HCC)    CKD (chronic kidney disease) stage 3, GFR 30-59 ml/min (HCC)    Chronic diastolic CHF (congestive heart failure) (HCC)    OBINNA (obstructive sleep apnea)    Mixed hyperlipidemia    Chronic deep vein thrombosis (DVT) (HCC)    Calculus of gallbladder    Extrahepatic biloma    Acute hypoxemic respiratory failure (HCC)    COVID-19    Pleural effusion on right    Past Medical History  Past Medical History:   Diagnosis Date    Anemia     Arthritis     Atrial fibrillation ([facility] )     Basal cell carcinoma 03/22/2022    Tip of Nose    CHF (congestive heart failure) (HCC)     Diabetes mellitus ([facility] )     Niddm    DVT (deep vein thrombosis) in pregnancy 1966    not in pregnancy    DVT (deep venous thrombosis) ([facility] ) 1966    Dyslipidemia     Encephalopathy acute 11/4/2022    GERD (gastroesophageal reflux disease)     Hyperlipidemia     Hypertension     Hypomagnesemia 10/19/2022    Irregular heart beat     Afib    Morbid obesity due to excess calories ([address] )     Resolved 9/2/2014     Pulmonary embolism (HCC)     Sepsis ([facility] )     Squamous cell skin cancer 07/30/2020    Left posterior scalp    Visual impairment      Past Surgical History  Past Surgical History:   Procedure Laterality Date    AORTIC VALVE REPLACEMENT      CARDIAC DEFIBRILLATOR PLACEMENT  04/2014    CARDIAC SURGERY  02/2014    AVR    COLONOSCOPY      INSERT / REPLACE / REMOVE PACEMAKER      IR ASPIRATION BAKERS CYST  3/8/2023    IR CHOLECYSTOSTOMY TUBE CHECK/CHANGE/REPOSITION/REINSERTION/UPSIZE  10/13/2022    IR CHOLECYSTOSTOMY TUBE CHECK/CHANGE/REPOSITION/REINSERTION/UPSIZE  10/19/2022    IR CHOLECYSTOSTOMY TUBE CHECK/CHANGE/REPOSITION/REINSERTION/UPSIZE  10/27/2022    IR CHOLECYSTOSTOMY TUBE CHECK/CHANGE/REPOSITION/REINSERTION/UPSIZE  11/7/2022    IR CHOLECYSTOSTOMY TUBE CHECK/CHANGE/REPOSITION/REINSERTION/UPSIZE  11/10/2022    IR CHOLECYSTOSTOMY TUBE CHECK/CHANGE/REPOSITION/REINSERTION/UPSIZE  12/1/2022    IR CHOLECYSTOSTOMY TUBE CHECK/CHANGE/REPOSITION/REINSERTION/UPSIZE  1/6/2023    IR CHOLECYSTOSTOMY TUBE CHECK/CHANGE/REPOSITION/REINSERTION/UPSIZE  1/24/2023    IR CHOLECYSTOSTOMY TUBE CHECK/CHANGE/REPOSITION/REINSERTION/UPSIZE  3/15/2023    IR CHOLECYSTOSTOMY TUBE PLACEMENT  9/1/2022    IR DRAINAGE TUBE CHECK AND/OR REMOVAL  3/22/2023    JOINT REPLACEMENT Left     LTKR    MOHS SURGERY  07/30/2020    Left posterior scalp, Dr Taiwo Tarango  04/18/2022    [facility] Tip of Nose- Dr Lianne Arechiga DIVJ&STRIP LONG SAPH Libby Jean Right 8/17/2018    Procedure: LEG PERFORATED INJECTION SCLEROTHERAPY;  Surgeon: Keith Hendrix MD;  Location: [facility];  Service: Vascular    REPLACEMENT TOTAL KNEE Right     TOTAL KNEE ARTHROPLASTY Left     VASCULAR SURGERY      VENA CAVA FILTER PLACEMENT      Interruption inferior vena cava, Josue filter, placement    WISDOM TOOTH EXTRACTION           04/06/23 1211   OT Last Visit   OT Visit Date 04/06/23   Note Type   Note type Evaluation   Pain Assessment   Pain Assessment Tool 0-10   Pain Score No Pain   Restrictions/Precautions   Weight Bearing Precautions Per Order No   Other Precautions Contact/isolation; Airborne/isolation;Droplet precautions; Chair Alarm; Bed Alarm;Multiple lines; Fall Risk;Telemetry  (IV)   Home Living   Type of [address] [place] [address] Ave Two level;Performs ADLs on one level; Able to live on main level with bedroom/bathroom  (bi-level with [facility])   Bathroom Shower/Tub Walk-in shower   Bathroom Toilet Standard   Bathroom Equipment Grab bars in shower; Shower chair;Grab bars around toilet   [address] "Walker;Cane  (rollator)   Additional Comments Pt reports he has 9 long RADHA to complete to get to work   Prior Function   Level of [place] Independent with ADLs; Independent with functional mobility; Needs assistance with IADLS   Lives With Spouse  (daughter and family live upstairs)   Receives Help From Family   IADLs Independent with driving; Independent with medication management; Family/Friend/Other provides meals   Falls in the last 6 months 0   Vocational Full time employment  (Works as a  at his [employer])   Comments Pt normally (I) with ADLs, RW for mobility since September, intermittent use of rollator for longer distances  Pt continues to work full-time as a  for his [employer] and is usually able to walk community distances   Daughter reports pt has been more confused lately   Lifestyle   Autonomy Pt (I) with ADLs baseline living with spouse in a bi-level home with FFSU, RW, (-) falls, (+)    Reciprocal Relationships Supportive spouse and family   Service to Others Full-time employment as a  for his local [employer]   Intrinsic Gratification Enjoys spending quality time with his wife and reports \"She's the boss\"   General   Additional Pertinent History COVID-19 +   Family/Caregiver Present Yes  (Daughter at beginning of session and spouse Rowan Joy)   Subjective   Subjective \"The key is not fighting\" (for a happy marriage)   ADL   Eating Assistance Unable to assess   Eating Deficit NPO   Grooming Assistance 5  Supervision/Setup   Grooming Deficit Wash/dry face  (blow nose sitting in recliner)   UB Bathing Assistance 5  Supervision/Setup   LB Bathing Assistance 4  Minimal Assistance   [address] 5  Supervision/Setup   LB Dressing Assistance 4  [address]  5  Supervision/Setup   Bed Mobility   Additional Comments Pt received in recliner and returned at end of session   Transfers   Sit to Stand 5  Supervision   Additional items Assist x " 1;Armrests; Increased time required;Verbal cues   Stand to Sit 5  Supervision   Additional items Assist x 1; Increased time required;Verbal cues;Armrests   Toilet transfer 5  Supervision   Additional items Assist x 1; Increased time required;Verbal cues;Standard toilet  (use of grab bar L)   Additional Comments Increased time and effort to complete functional transfers   Functional Mobility   Functional Mobility 5  Supervision   Additional Comments Short household distance to bathroom and back with RW with (S)  SOB upon return to recliner SpO2 remained 100% on room air  Pt rated fatigue level as a 5/10  Additional items Rolling walker   Balance   Static Sitting Fair +   Dynamic Sitting Fair   Static Standing Fair -   Dynamic Standing Fair -   Activity Tolerance   Activity Tolerance Patient limited by fatigue  (limited by SOB)   Medical Staff Made Aware Care coordinated with PT David Segovia   Nurse Made Aware yes, RN Tanisha Lima ok to see pt and updated on outcomes   RUE Assessment   RUE Assessment WFL   LUE Assessment   LUE Assessment WFL   Hand Function   Gross Motor Coordination Functional   Fine Motor Coordination Functional   Sensation   Light Touch No apparent deficits   Vision-Basic Assessment   Current Vision Wears glasses all the time   Cognition   Overall Cognitive Status WFL  (functional cognition appears [facility] however questionable higher level cognition)   Arousal/Participation Alert; Cooperative   Attention Within functional limits   Orientation Level Oriented to person;Oriented to place;Oriented to time  (generally oriented to situation: (Gallbladder) unable to recall COVID)   Memory Decreased short term memory;Decreased recall of precautions   Following Commands Follows multistep commands with increased time or repetition   Comments Pleasant and agreeable, motivated  Functional cognition/safety and command follow appear [facility] however questionable higher level cognition  See below for mini-cog   Pt may benefit from additional cognitive evaluation to determine safe DC planning  Assessment   Limitation Decreased ADL status; Decreased cognition;Decreased endurance;Decreased self-care trans;Decreased high-level ADLs   Prognosis Good   Assessment Pt is a 81 y o  male seen for OT evaluation s/p admit to [address] [place] [address] on 4/4/2023 w/Pneumonia of both lungs due to infectious organism  Comorbidities affecting patient at time of evaluation are listed above  Personal and environmental factors affecting patient at time of evaluation include: limited insight into deficits, decreased initiation and engagement, difficulty performing ADLs and difficulty performing IADLs  PTA, patient was independent with functional mobility with RW, independent with ADLS, requiring assist for IADLS, living with spouse in a bi level home with FFSU and 0 steps to enter and works full time  The evaluation identifies the following performance deficits: weakness, decreased endurance, new onset of impairment of functional mobility, decreased ADLS, decreased IADLS, decreased activity tolerance, SOB upon exertion, decreased cognition and decreased strength, that result in activity limitations (as is outlined above in flowsheet)  The patient's raw score on the AM-PAC Daily Activity inpatient short form is 22, standardized score is 47 1, greater than 39 4  From an OT standpoint, patients at this level may benefit from d/c to Home with home health rehabilitation  Pt will benefit from continued IPOT treatment to address above deficits and maximize level of independence with ADLs and functional mobility in the areas of: bathing/ shower, dressing, toilet hygiene, transfer to all surfaces and functional ambulation  Goals   Patient Goals to get back to work   LTG Time Frame 10-14   Long Term Goal #1 see goals below   Plan   Treatment Interventions ADL retraining;Functional transfer training; Endurance training;Cognitive reorientation;Patient/family training;Equipment "evaluation/education; Compensatory technique education;Continued evaluation; Energy conservation   Goal Expiration Date 04/16/23   OT Treatment Day 0   OT Frequency 2-3x/wk   Recommendation   OT Discharge Recommendation Home with home health rehabilitation  (increased social support for ADL/IADL completion)   Additional Comments  Pt seen as a co-eval with PT due to the patient's co-morbidities, clinically unstable presentation, and present impairments which are a regression from the patient's baseline  AM-PAC Daily Activity Inpatient   Lower Body Dressing 3   Bathing 3   Toileting 4   Upper Body Dressing 4   Grooming 4   Eating 4   Daily Activity Raw Score 22   Daily Activity Standardized Score (Calc for Raw Score >=11) 47  1   AM-PAC Applied Cognition Inpatient   Following a Speech/Presentation 4   Understanding Ordinary Conversation 4   Taking Medications 3   Remembering Where Things Are Placed or Put Away 4   Remembering List of 4-5 Errands 2   Taking Care of Complicated Tasks 3   Applied Cognition Raw Score 20   Applied Cognition Standardized Score 41 76   Mini-Cog Assessment   Word Version Version 1: Banana, Sunrise, Chair   Clock Draw (CDT) 0   Word Recall 2   Mini-Cog Score 2   Mini-Cog Results Positive screen for dementia   Mini-Cog Assessment Comment Pt unable to draw hands of clock despite cueing  Able to label numbers correctly however evans an additional number 10 at the 2 spot when prompted to draw hands of clock  When prompted to retry, pt evans only the numbers 11 and 10 outside of the clock [place] and added additional lines crossing through each number from L to R indicating the \"hands\"  End of Consult   Patient Position at End of Consult Bedside chair;Bed/Chair alarm activated; All needs within reach   Nurse Communication Nurse aware of consult     GOALS:    *Goals established to promote patient goal of to get back to work:      *ADL transfers with (I) for inc'd independence with ADLs/purposeful " tasks    *LB ADL with (I) using AE prn for inc'd independence with self care    *Toileting with (I) for clothing management and hygiene to increase hygiene/thoroughness in order to reduce caregiver burden    *Increase stand tolerance x 5  m for inc'd tolerance with standing purposeful tasks    *Patient will verbalize 3 safety awareness/ principles to prevent falls in the home setting  *Patient will verbalize and demonstrate use of energy conservation/deep breathing techniques and work simplification skills during functional activities with no verbal cues  *Patient will engage in ongoing cognitive assessment to assist with safe discharge planning/recommendations  *Patient will increase functional mobility to and from bathroom with rolling walker independently to increase independence with toileting    *Patient will improve functional activity tolerance to 20 minutes of sustained functional tasks to increase participation in basic self-care and decrease assistance level       FAUSTO Keyes, OTR/L    [place] License [id number]  [address] [facility] [id number]

## 2023-04-07 ENCOUNTER — HOME HEALTH ADMISSION (OUTPATIENT)
Dept: HOME HEALTH SERVICES | Facility: HOME HEALTHCARE | Age: 88
End: 2023-04-07
Payer: MEDICARE

## 2023-04-07 VITALS
WEIGHT: 315 LBS | HEART RATE: 77 BPM | HEIGHT: 73 IN | BODY MASS INDEX: 41.75 KG/M2 | TEMPERATURE: 97.4 F | SYSTOLIC BLOOD PRESSURE: 147 MMHG | DIASTOLIC BLOOD PRESSURE: 84 MMHG | RESPIRATION RATE: 18 BRPM | OXYGEN SATURATION: 98 %

## 2023-04-07 PROBLEM — J96.01 ACUTE HYPOXEMIC RESPIRATORY FAILURE (HCC): Status: RESOLVED | Noted: 2023-04-05 | Resolved: 2023-04-07

## 2023-04-07 LAB
BACTERIA SPT RESP CULT: ABNORMAL
BACTERIA SPT RESP CULT: ABNORMAL
GLUCOSE SERPL-MCNC: 196 MG/DL (ref 65–140)
GRAM STN SPEC: ABNORMAL

## 2023-04-07 RX ORDER — CEFDINIR 300 MG/1
300 CAPSULE ORAL EVERY 12 HOURS SCHEDULED
Qty: 8 CAPSULE | Refills: 0 | Status: SHIPPED | OUTPATIENT
Start: 2023-04-07 | End: 2023-04-11

## 2023-04-07 RX ORDER — AMOXICILLIN 250 MG
1 CAPSULE ORAL
Qty: 30 TABLET | Refills: 0 | Status: SHIPPED | OUTPATIENT
Start: 2023-04-07

## 2023-04-07 RX ORDER — CEFDINIR 300 MG/1
300 CAPSULE ORAL EVERY 12 HOURS SCHEDULED
Status: DISCONTINUED | OUTPATIENT
Start: 2023-04-07 | End: 2023-04-07 | Stop reason: HOSPADM

## 2023-04-07 RX ORDER — BENZONATATE 100 MG/1
100 CAPSULE ORAL 3 TIMES DAILY PRN
Qty: 20 CAPSULE | Refills: 0 | Status: SHIPPED | OUTPATIENT
Start: 2023-04-07

## 2023-04-07 RX ORDER — METRONIDAZOLE 500 MG/1
500 TABLET ORAL EVERY 8 HOURS SCHEDULED
Qty: 12 TABLET | Refills: 0 | Status: SHIPPED | OUTPATIENT
Start: 2023-04-07 | End: 2023-04-11

## 2023-04-07 RX ORDER — METRONIDAZOLE 500 MG/1
500 TABLET ORAL EVERY 8 HOURS SCHEDULED
Status: DISCONTINUED | OUTPATIENT
Start: 2023-04-07 | End: 2023-04-07 | Stop reason: HOSPADM

## 2023-04-07 RX ADMIN — DEXAMETHASONE SODIUM PHOSPHATE 6 MG: 4 INJECTION INTRA-ARTICULAR; INTRALESIONAL; INTRAMUSCULAR; INTRAVENOUS; SOFT TISSUE at 01:34

## 2023-04-07 RX ADMIN — HEPARIN SODIUM 5000 UNITS: 5000 INJECTION INTRAVENOUS; SUBCUTANEOUS at 05:00

## 2023-04-07 RX ADMIN — INSULIN LISPRO 2 UNITS: 100 INJECTION, SOLUTION INTRAVENOUS; SUBCUTANEOUS at 10:55

## 2023-04-07 RX ADMIN — REMDESIVIR 100 MG: 100 INJECTION, POWDER, LYOPHILIZED, FOR SOLUTION INTRAVENOUS at 02:59

## 2023-04-07 RX ADMIN — SOTALOL HYDROCHLORIDE 80 MG: 80 TABLET ORAL at 10:54

## 2023-04-07 RX ADMIN — CEFDINIR 300 MG: 300 CAPSULE ORAL at 10:54

## 2023-04-07 RX ADMIN — BARICITINIB 2 MG: 2 TABLET, FILM COATED ORAL at 02:58

## 2023-04-07 RX ADMIN — METRONIDAZOLE 500 MG: 500 INJECTION, SOLUTION INTRAVENOUS at 04:53

## 2023-04-07 RX ADMIN — NYSTATIN: 100000 POWDER TOPICAL at 10:44

## 2023-04-07 RX ADMIN — INSULIN LISPRO 2 UNITS: 100 INJECTION, SOLUTION INTRAVENOUS; SUBCUTANEOUS at 11:54

## 2023-04-07 NOTE — CASE MANAGEMENT
Case Management Discharge Planning Note    Patient name Danielle Patel S /S -50 MRN [id number]  : 1935 Date 2023       Current Admission Date: 2023  Current Admission Diagnosis:Pneumonia of both lungs due to infectious organism   Patient Active Problem List    Diagnosis Date Noted   • Extrahepatic biloma 2023   • Pneumonia of both lungs due to infectious organism 2023   • Acute hypoxemic respiratory failure ([facility] ) 2023   • COVID-19 2023   • Pleural effusion on right 2023   • Calculus of gallbladder 2023   • Cholecystostomy care ([facility] ) 2022   • Gout 2022   • Acute on chronic cholecystitis 2022   • Acute cholecystitis 2022   • Benign prostatic hyperplasia with lower urinary tract symptoms 2022   • OBINNA (obstructive sleep apnea)    • CKD (chronic kidney disease) stage 3, GFR 30-59 ml/min ([facility] ) 2022   • Acute kidney injury ([facility] ) 2021   • Osteoarthritis, knee 11/15/2021   • Status post right knee replacement 11/15/2021   • Mixed hyperlipidemia 2021   • Swelling of limb 2021   • Chronic deep vein thrombosis (DVT) ([facility] ) 09/10/2020   • Secondary lymphedema 2018   • Venous ulcer of ankle, right ([facility] ) 2018   • S/P AVR 2018   • Thrombophlebitis 2018   • Presence of implantable cardioverter-defibrillator (ICD) 2018   • Chronic bilateral low back pain without sciatica 2017   • BPH (benign prostatic hyperplasia) 2017   • Obesity, unspecified obesity severity, unspecified obesity type 2017   • Hydrocele 2017   • Chronic anticoagulation 2017   • Anemia 2017   • Renal cyst 2017   • 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 2015   • Ventricular tachycardia 04/15/2014   • Paroxysmal atrial fibrillation ([facility] ) 2014   • Edema 2014   • Chronic diastolic CHF (congestive heart failure) ([facility] ) 2014   • Endocarditis 03/19/2014   • Type 2 diabetes mellitus with diabetic neuropathy, without long-term current use of insulin ([facility] ) 08/19/2013   • Peripheral venous insufficiency 08/19/2013   • Cardiomegaly 08/19/2013   • PVD (peripheral vascular disease) ([facility] ) 04/19/2013   • Dyslipidemia 04/19/2013      LOS (days): 2  Geometric Mean LOS (GMLOS) (days): 5 20  Days to GMLOS:2 8     OBJECTIVE:  Risk of Unplanned Readmission Score: 31 1         Current admission status: Inpatient   Preferred Pharmacy:   [address] [address]  Phone: [phone] Fax: [phone]    Primary Care Provider: Imtiaz Kelley DO    Primary Insurance: MEDICARE  Secondary Insurance: [facility] HE[employer] OPTIONS PROGRAM    DISCHARGE DETAILS:    Discharge planning discussed with[de-identified] Patient's spouse  Freedom of Choice: Yes  Comments - Freedom of Choice: Choice is for [facility] for [facility] provider  CM contacted family/caregiver?: Yes  Were Treatment Team discharge recommendations reviewed with patient/caregiver?: Yes  Did patient/caregiver verbalize understanding of patient care needs?: N/A- going to facility  Were patient/caregiver advised of the risks associated with not following Treatment Team discharge recommendations?: Yes    Contacts  Patient Contacts: Ayaz Sahu  Relationship to Patient[de-identified] Family  Contact Method: Phone  Phone Number: [phone]  Reason/Outcome: Emergency Contact, Discharge [address]         Is the patient interested in [facility] at discharge?: Yes  [address] requested[de-identified] Nursing, Physical Therapy, Occupational [address] Name[de-identified] [address] [facility] Provider[de-identified] PCP  Home Health Services Needed[de-identified] Diabetes Management, Evaluate Functional Status and Safety, Gait/ADL Training, Heart Failure Management, Strengthening/Theraputic Exercises to Improve Function, Wound/Ostomy Care  Homebound Criteria Met[de-identified] Requires the Assistance of Another Person for Safe Ambulation or to Leave the Home, Uses an Assist Device (i e  cane, walker, etc)  Supporting Clincal Findings[de-identified] Fatigues Easliy in [employer], [employer]    DME Referral Provided  Referral made for DME?: No    Other Referral/Resources/Interventions Provided:  Interventions: C  Referral Comments: [facility] confirmed with LALA    Treatment Team Recommendation: [facility] with [facility]  Discharge Destination Plan[de-identified] Home with [facility] [place])  Transport at Discharge : Family     IMM Given (Date):: 04/07/23  IMM Given to[de-identified] Family

## 2023-04-07 NOTE — ASSESSMENT & PLAN NOTE
Lab Results   Component Value Date    HGBA1C 6 3 (H) 03/01/2022       Recent Labs     04/06/23  1149 04/06/23  1821 04/06/23  2334 04/07/23  0644   POCGLU 152* 155* 157* 196*       Blood Sugar Average: Last 72 hrs:  (P) 145 5   · Resume metformin

## 2023-04-07 NOTE — PROGRESS NOTES
"Progress Note -general surgery  Samy Qiu 81 y o  male MRN: [id number]  Unit/Bed#: S -01 Encounter: [id number]    Assessment:  81 y o  male  with abdominal fluid collection suspected to be bile leak in the setting of recurrent cholecystitis requiring multiple perc jeny tubes  Also positive for covid  Patient successfully transferred out of the ICU yesterday and is now on room air  Continues to deny any abdominal pain and is tolerating diet  He is s/p placement of 10 [place] IR drain on 4/6 into his abdominal collection  Plan:  - Diet Cardiovascular; Cardiac; Consistent Carbohydrate Diet Level 2 (5 carb servings/75 grams CHO/meal)  - Pain and Nausea control PRN  - Incentive spirometry  - OOB, ambulate  -General surgery will sign off for now  Please reach out with any additional questions  Subjective/Objective     Subjective: The patients pain is well controlled and the patient denies any nausea         Objective:   Vitals: Blood pressure 140/93, pulse 91, temperature (!) 97 4 °F (36 3 °C), temperature source Oral, resp  rate 17, height 6' 1\" (1 854 m), weight (!) 147 kg (323 lb 1 6 oz), SpO2 96 %  ,Body mass index is 42 63 kg/m²  I/O       04/05 0701  04/06 0700 04/06 0701  04/07 0700 04/07 0701  04/08 0700    I V  (mL/kg) 949 2 (6 5) 5 (0)     IV Piggyback 770      Total Intake(mL/kg) 1719 2 (11 7) 5 (0)     Urine (mL/kg/hr) 244 (0 1)      Drains  940     Total Output 244 940     Net +1475 2 -935            Unmeasured Urine Occurrence 2 x            Physical Exam:  Gen: NAD, Aox3, Comfortable in bed  Chest: Normal work of breathing, no respiratory distress  Abd: Soft, ND, NT  Ext: No Edema  Skin: Warm, Dry, Intact      Lab, Imaging and other studies: I have personally reviewed pertinent reports      VTE Pharmacologic Prophylaxis: Heparin  VTE Mechanical Prophylaxis: sequential compression device        Roberto Klein MD  4/7/2023  7:38 AM    "

## 2023-04-07 NOTE — ASSESSMENT & PLAN NOTE
• As seen on CT chest 4/4- significant infiltrate vs GGO LLE  • Now on room air  • Given Ceftriaxone and Azithro in ED  ?  Given recent admission on 3/8, abx changed to Cefepime, Vancomycin (both abx day 2)  • 4/6: May be secondary to Covid- started Moderate pathway: Decadron, baricitinib, remdesivir  • We will discharge with Omnicef and Flagyl

## 2023-04-07 NOTE — ASSESSMENT & PLAN NOTE
• Recent perc jeny placed and then removed 2 weeks ago  • No reported issues since removal  • Still with visible stones on CT scan

## 2023-04-07 NOTE — CONSULTS
Vancomycin IV Pharmacy-to-Dose Consultation    Juan Luis Santoyo is a 81 y o  male who was receiving Vancomycin IV with management by the Pharmacy Consult service for treatment of Pneumonia (goal -600, trough >10)    The patient's Vancomycin therapy has been completed / discontinued  Thank you for allowing us to take part in this patient's care  Pharmacy will sign-off now; please call or re-consult if there are any questions          Magy Red PharmD  Pharmacist

## 2023-04-07 NOTE — DISCHARGE SUMMARY
[facility]  Discharge- [facility] 1935, 81 y o  male MRN: [id number]  Unit/Bed#: S -01 Encounter: [id number]  Primary Care Provider: Wili Anaya DO   Date and time admitted to hospital: 4/4/2023  6:37 PM    COVID-19  Assessment & Plan  · On room air  · Supportive care at home  Extrahepatic biloma  Assessment & Plan  • Recent perc jeny tube removed 2 weeks ago  • Large collection on CT 9 x 5 x 13 cm concerning for Biloma vs possible abscess  • Continue Flagyl and Omnicef  • 4/6: s/p IR drainage tube placement in right lower abdomen   • Will need outpatient follow-up with nursing and general surgery, general surgery signed off today    Calculus of gallbladder  Assessment & Plan  • Recent perc jeny placed and then removed 2 weeks ago  • No reported issues since removal  • Still with visible stones on CT scan    Chronic deep vein thrombosis (DVT) (HCC)  Assessment & Plan  · Resume Coumadin    Chronic diastolic CHF (congestive heart failure) (HCC)  Assessment & Plan  Wt Readings from Last 3 Encounters:   04/07/23 (!) 147 kg (323 lb 1 6 oz)   03/08/23 (!) 137 kg (301 lb)   01/23/23 (!) 139 kg (307 lb)     · Appears compensated      CKD (chronic kidney disease) stage 3, GFR 30-59 ml/min [facility])  Assessment & Plan  Lab Results   Component Value Date    EGFR 45 04/06/2023    EGFR 45 04/05/2023    EGFR 42 04/04/2023    CREATININE 1 38 (H) 04/06/2023    CREATININE 1 39 (H) 04/05/2023    CREATININE 1 46 (H) 04/04/2023   · Creatinine stable at baseline    Paroxysmal atrial fibrillation (HCC)  Assessment & Plan  · Resume Coumadin and sotalol    Type 2 diabetes mellitus with diabetic neuropathy, without long-term current use of insulin [facility])  Assessment & Plan  Lab Results   Component Value Date    HGBA1C 6 3 (H) 03/01/2022       Recent Labs     04/06/23  1149 04/06/23  1821 04/06/23  2334 04/07/23  0644   POCGLU 152* 155* 157* 196*       Blood Sugar Average: Last 72 hrs:  (P) 145 5 · Resume metformin    * Pneumonia of both lungs due to infectious organism  Assessment & Plan  • As seen on CT chest 4/4- significant infiltrate vs GGO LLE  • Now on room air  • Given Ceftriaxone and Azithro in ED  ? Given recent admission on 3/8, abx changed to Cefepime, Vancomycin (both abx day 2)  • 4/6: May be secondary to Covid- started Moderate pathway: Decadron, baricitinib, remdesivir  • We will discharge with LUIS JHA and Flagyl    Medical Problems     Resolved Problems  Date Reviewed: 4/6/2023          Resolved    Acute hypoxemic respiratory failure ([facility] ) 4/7/2023     Resolved by  Chico Corbin PA-C        Discharging Physician / Practitioner: Jagjit Noriega PA-C  PCP: Otf Linares DO  Admission Date:   Admission Orders (From admission, onward)     Ordered        04/05/23 0119  Inpatient Admission  Once                      Discharge Date: 04/07/23    Consultations During Hospital Stay:  · General surgery  · ICU  · IR    Procedures Performed:   · IR perc abd drain placement    Significant Findings / Test Results:   · Chest x-ray with pulmonary edema versus GGO  · CT chest abdomen pelvis with lower lobe infiltrates bilaterally jesting multifocal pneumonia, loculated fluid collection surrounding the cecum which could be from bile leak    Incidental Findings:   · none     Test Results Pending at Discharge (will require follow up):   · none     Outpatient Tests Requested:  · Will need IR drain removal in 1 week per IR  Complications:  none     Reason for Admission: severe shortness of breath with abdominal pain    Hospital Course: Juan Luis Santoyo is a 81 y o  male patient who originally presented to the hospital on 4/4/2023 due to respiratory distress and abdominal pain  Found to have COVID-19 infection upon admission and required BiPAP for stabilization  Thus was admitted to the ICU under the moderate to severe pathway receiving Decadron, remdesivir, baricitinib    Found to have some abdominal "pain with history of percutaneous cholecystostomy tube for cholecystitis  CT of the abdomen showed a fluid collection for which IR was consulted and inserted a drain  This fluid was serous in nature and there was no concern for intra-abdominal infection  Was found to have a bilateral pneumonia for which she was placed on broad-spectrum antibiotics  Blood cultures negative he was quickly weaned off of O2  As of today he is on room air and feeling well, reporting a cough but no shortness of breath  He is eating with no belly pain  Recommending outpatient follow-up with IR for drain removal   We will send in with Omnicef and Flagyl for pneumonia for total of 7 days of antibiotics  He wants to go home today and will be sent home with VNA as well as home therapy  Please see above list of diagnoses and related plan for additional information  Condition at Discharge: stable    Discharge Day Visit / Exam:   Subjective: Patient feels well  Denying abdominal pain or shortness of breath presently  Still with a cough  Reports to me that he wants to go home today if possible  He is eating and doing well otherwise  Vitals: Blood Pressure: 139/90 (04/07/23 0803)  Pulse: 64 (04/07/23 0803)  Temperature: (!) 97 2 °F (36 2 °C) (04/07/23 0803)  Temp Source: Oral (04/07/23 0222)  Respirations: 16 (04/07/23 0803)  Height: 6' 1\" (185 4 cm) (04/05/23 0202)  Weight - Scale: (!) 147 kg (323 lb 1 6 oz) (04/07/23 0600)  SpO2: 98 % (04/07/23 0803)  Exam:   Physical Exam  Vitals and nursing note reviewed  Constitutional:       General: He is not in acute distress  Appearance: Normal appearance  He is obese  HENT:      Head: Normocephalic  Mouth/Throat:      Mouth: Mucous membranes are moist    Eyes:      Pupils: Pupils are equal, round, and reactive to light  Cardiovascular:      Rate and Rhythm: Normal rate and regular rhythm  Heart sounds: No murmur heard    Pulmonary:      Effort: Pulmonary effort is " normal  No respiratory distress  Breath sounds: Normal breath sounds  No wheezing, rhonchi or rales  Abdominal:      General: Bowel sounds are normal  There is no distension  Palpations: Abdomen is soft  Tenderness: There is no abdominal tenderness  There is no guarding  Musculoskeletal:         General: No deformity  Cervical back: Normal range of motion  Right lower leg: No edema  Left lower leg: No edema  Skin:     Capillary Refill: Capillary refill takes less than 2 seconds  Neurological:      General: No focal deficit present  Mental Status: He is alert and oriented to person, place, and time  Mental status is at baseline  Discussion with Family: Patient declined call to   Discharge instructions/Information to patient and family:   See after visit summary for information provided to patient and family  Provisions for Follow-Up Care:  See after visit summary for information related to follow-up care and any pertinent home health orders  Disposition:   Home with VNA Services (Reminder: Complete face to face encounter)    Planned Readmission: no     Discharge Statement:  I spent 45 minutes discharging the patient  This time was spent on the day of discharge  I had direct contact with the patient on the day of discharge  Greater than 50% of the total time was spent examining patient, answering all patient questions, arranging and discussing plan of care with patient as well as directly providing post-discharge instructions  Additional time then spent on discharge activities  Discharge Medications:  See after visit summary for reconciled discharge medications provided to patient and/or family        **Please Note: This note may have been constructed using a voice recognition system**

## 2023-04-07 NOTE — DISCHARGE INSTR - AVS FIRST PAGE
Dear Cj Rahman,     It was our pleasure to care for you here at [facility]  It is our hope that we were always able to exceed the expected standards for your care during your stay  You were hospitalized due to COVID  You were cared for on the 4th floor by Hugo Hamlin PA-C under the service of Yuriy Murry,* with the [facility] who covers for your primary care physician (PCP), Jasmyne Guzman, DO, while you were hospitalized  If you have any questions or concerns related to this hospitalization, you may contact us at [phone]  For follow up as well as any medication refills, we recommend that you follow up with your primary care physician  A registered nurse will reach out to you by phone within a few days after your discharge to answer any additional questions that you may have after going home  However, at this time we provide for you here, the most important instructions / recommendations at discharge:     Notable Medication Adjustments -   Please take the medications cefdinir and metronidazole for the next 4 days for antibiotics  Please also take the Colace and tessalon  Testing Required after Discharge -   None  Important follow up information -   Please follow-up with your family doctor in the next few weeks  Other Instructions -   We are setting up home health care for you  Please review this entire after visit summary as additional general instructions including medication list, appointments, activity, diet, any pertinent wound care, and other additional recommendations from your care team that may be provided for you        Sincerely,     Hugo Hamlin PA-C

## 2023-04-07 NOTE — ASSESSMENT & PLAN NOTE
• Recent perc jeny tube removed 2 weeks ago  • Large collection on CT 9 x 5 x 13 cm concerning for Biloma vs possible abscess  • Continue Flagyl and Omnicef  • 4/6: s/p IR drainage tube placement in right lower abdomen   • Will need outpatient follow-up with nursing and general surgery, general surgery signed off today

## 2023-04-07 NOTE — PLAN OF CARE
Problem: PAIN - ADULT  Goal: Verbalizes/displays adequate comfort level or baseline comfort level  Description: Interventions:  - Encourage patient to monitor pain and request assistance  - Assess pain using appropriate pain scale  - Administer analgesics based on type and severity of pain and evaluate response  - Implement non-pharmacological measures as appropriate and evaluate response  - Consider cultural and social influences on pain and pain management  - Notify physician/advanced practitioner if interventions unsuccessful or patient reports new pain  Outcome: Progressing     Problem: INFECTION - ADULT  Goal: Absence or prevention of progression during hospitalization  Description: INTERVENTIONS:  - Assess and monitor for signs and symptoms of infection  - Monitor lab/diagnostic results  - Monitor all insertion sites, i e  indwelling lines, tubes, and drains  - Monitor endotracheal if appropriate and nasal secretions for changes in amount and color  - [place] appropriate cooling/warming therapies per order  - Administer medications as ordered  - Instruct and encourage patient and family to use good hand hygiene technique  - Identify and instruct in appropriate isolation precautions for identified infection/condition  Outcome: Progressing  Goal: Absence of fever/infection during neutropenic period  Description: INTERVENTIONS:  - Monitor WBC    Outcome: Progressing     Problem: SAFETY ADULT  Goal: Patient will remain free of falls  Description: INTERVENTIONS:  - Educate patient/family on patient safety including physical limitations  - Instruct patient to call for assistance with activity   - Consult OT/PT to assist with strengthening/mobility   - Keep Call bell within reach  - Keep bed low and locked with side rails adjusted as appropriate  - Keep care items and personal belongings within reach  - Initiate and maintain comfort rounds  - Make Fall Risk Sign visible to staff  - Offer Toileting every 2 Hours, in advance of need  - Initiate/Maintain alarm  - Obtain necessary fall risk management equipment  - Apply yellow socks and bracelet for high fall risk patients  - Consider moving patient to room near nurses station  Outcome: Progressing  Goal: Maintain or return to baseline ADL function  Description: INTERVENTIONS:  -  Assess patient's ability to carry out ADLs; assess patient's baseline for ADL function and identify physical deficits which impact ability to perform ADLs (bathing, care of mouth/teeth, toileting, grooming, dressing, etc )  - Assess/evaluate cause of self-care deficits   - Assess range of motion  - Assess patient's mobility; develop plan if impaired  - Assess patient's need for assistive devices and provide as appropriate  - Encourage maximum independence but intervene and supervise when necessary  - Involve family in performance of ADLs  - Assess for home care needs following discharge   - Consider OT consult to assist with ADL evaluation and planning for discharge  - Provide patient education as appropriate  Outcome: Progressing  Goal: Maintains/Returns to pre admission functional level  Description: INTERVENTIONS:  - Perform BMAT or MOVE assessment daily    - Set and communicate daily mobility goal to care team and patient/family/caregiver  - Collaborate with rehabilitation services on mobility goals if consulted  - Perform Range of Motion 3 times a day  - Reposition patient every 2 hours    - Dangle patient 3 times a day  - Stand patient 3 times a day  - Ambulate patient 3 times a day  - Out of bed to chair 3 times a day   - Out of bed for meals 3 times a day  - Out of bed for toileting  - Record patient progress and toleration of activity level   Outcome: Progressing     Problem: DISCHARGE PLANNING  Goal: Discharge to home or other facility with appropriate resources  Description: INTERVENTIONS:  - Identify barriers to discharge w/patient and caregiver  - Arrange for needed discharge resources and transportation as appropriate  - Identify discharge learning needs (meds, wound care, etc )  - Arrange for interpretive services to assist at discharge as needed  - Refer to Case Management Department for coordinating discharge planning if the patient needs post-hospital services based on physician/advanced practitioner order or complex needs related to functional status, cognitive ability, or social support system  Outcome: Progressing     Problem: Nutrition/Hydration-ADULT  Goal: Nutrient/Hydration intake appropriate for improving, restoring or maintaining nutritional needs  Description: Monitor and assess patient's nutrition/hydration status for malnutrition  Collaborate with interdisciplinary team and initiate plan and interventions as ordered  Monitor patient's weight and dietary intake as ordered or per policy  Utilize nutrition screening tool and intervene as necessary  Determine patient's food preferences and provide high-protein, high-caloric foods as appropriate       INTERVENTIONS:  - Monitor oral intake, urinary output, labs, and treatment plans  - Assess nutrition and hydration status and recommend course of action  - Evaluate amount of meals eaten  - Assist patient with eating if necessary   - Allow adequate time for meals  - Recommend/ encourage appropriate diets, oral nutritional supplements, and vitamin/mineral supplements  - Order, calculate, and assess calorie counts as needed  - Recommend, monitor, and adjust tube feedings and TPN/PPN based on assessed needs  - Assess need for intravenous fluids  - Provide specific nutrition/hydration education as appropriate  - Include patient/family/caregiver in decisions related to nutrition  Outcome: Progressing     Problem: Knowledge Deficit  Goal: Patient/family/caregiver demonstrates understanding of disease process, treatment plan, medications, and discharge instructions  Description: Complete learning assessment and assess knowledge base   Interventions:  - Provide teaching at level of understanding  - Provide teaching via preferred learning methods  Outcome: Progressing     Problem: MOBILITY - ADULT  Goal: Maintain or return to baseline ADL function  Description: INTERVENTIONS:  -  Assess patient's ability to carry out ADLs; assess patient's baseline for ADL function and identify physical deficits which impact ability to perform ADLs (bathing, care of mouth/teeth, toileting, grooming, dressing, etc )  - Assess/evaluate cause of self-care deficits   - Assess range of motion  - Assess patient's mobility; develop plan if impaired  - Assess patient's need for assistive devices and provide as appropriate  - Encourage maximum independence but intervene and supervise when necessary  - Involve family in performance of ADLs  - Assess for home care needs following discharge   - Consider OT consult to assist with ADL evaluation and planning for discharge  - Provide patient education as appropriate  Outcome: Progressing  Goal: Maintains/Returns to pre admission functional level  Description: INTERVENTIONS:  - Perform BMAT or MOVE assessment daily    - Set and communicate daily mobility goal to care team and patient/family/caregiver  - Collaborate with rehabilitation services on mobility goals if consulted  - Perform Range of Motion 3 times a day  - Reposition patient every 2 hours    - Dangle patient 3 times a day  - Stand patient 3 times a day  - Ambulate patient 3 times a day  - Out of bed to chair 3 times a day   - Out of bed for meals 3 times a day  - Out of bed for toileting  - Record patient progress and toleration of activity level   Outcome: Progressing

## 2023-04-07 NOTE — ASSESSMENT & PLAN NOTE
Lab Results   Component Value Date    EGFR 45 04/06/2023    EGFR 45 04/05/2023    EGFR 42 04/04/2023    CREATININE 1 38 (H) 04/06/2023    CREATININE 1 39 (H) 04/05/2023    CREATININE 1 46 (H) 04/04/2023   · Creatinine stable at baseline

## 2023-04-07 NOTE — ASSESSMENT & PLAN NOTE
Wt Readings from Last 3 Encounters:   04/07/23 (!) 147 kg (323 lb 1 6 oz)   03/08/23 (!) 137 kg (301 lb)   01/23/23 (!) 139 kg (307 lb)     · Appears compensated

## 2023-04-08 ENCOUNTER — HOME CARE VISIT (OUTPATIENT)
Dept: HOME HEALTH SERVICES | Facility: HOME HEALTHCARE | Age: 88
End: 2023-04-08

## 2023-04-08 VITALS
HEART RATE: 66 BPM | SYSTOLIC BLOOD PRESSURE: 120 MMHG | BODY MASS INDEX: 41.16 KG/M2 | DIASTOLIC BLOOD PRESSURE: 60 MMHG | TEMPERATURE: 98.4 F | WEIGHT: 312 LBS | RESPIRATION RATE: 18 BRPM | OXYGEN SATURATION: 99 %

## 2023-04-08 DIAGNOSIS — K66.8: Primary | ICD-10-CM

## 2023-04-08 LAB — GLUCOSE SERPL-MCNC: 167 MG/DL (ref 65–140)

## 2023-04-08 PROCEDURE — 10330081 VN NO-PAY CLAIM PROCEDURE

## 2023-04-09 LAB
ATRIAL RATE: 98 BPM
BACTERIA SPEC ANAEROBE CULT: NO GROWTH
BACTERIA SPEC BFLD CULT: NO GROWTH
GRAM STN SPEC: NORMAL
MISCELLANEOUS LAB TEST RESULT: NORMAL
QRS AXIS: 170 DEGREES
QRSD INTERVAL: 154 MS
QT INTERVAL: 412 MS
QTC INTERVAL: 523 MS
T WAVE AXIS: 44 DEGREES
VENTRICULAR RATE: 97 BPM

## 2023-04-09 NOTE — QUICK NOTE
Please note that during the patient stay met sepsis criteria with fever and tachycardia  Suspected source is pneumonia  This was treated with IV antibiotics during his hospitalization and he will be discharged with Flagyl and Omnicef  This clinically resolved upon discharge      Ondina Bravo

## 2023-04-10 LAB
BACTERIA BLD CULT: NORMAL
BACTERIA BLD CULT: NORMAL

## 2023-04-10 PROCEDURE — 10330064 DRESSING, CALCIUM ALGINATE AG SHEET 4"X4

## 2023-04-10 PROCEDURE — 10330064 PAD, ABD 5X9" STR LF (1/PK 20PK/BX) MGM1

## 2023-04-10 PROCEDURE — 10330064 SPONGE, GAUZE 8PLY N/S 4"X4" (200/PK 20P

## 2023-04-10 PROCEDURE — 10330064 CLEANSER, WND SEA-CLEANS 6OZ  COLPLT

## 2023-04-10 PROCEDURE — 10330064 TAPE, ADHSV TRANSPORE WHT 2" (6RL/BX 10B

## 2023-04-10 PROCEDURE — 10330064 DRESSING, ACTICOAT FLEX 3 ANTIMICRO 4X4

## 2023-04-24 ENCOUNTER — HOME CARE VISIT (OUTPATIENT)
Dept: HOME HEALTH SERVICES | Facility: HOME HEALTHCARE | Age: 88
End: 2023-04-24

## 2023-04-24 VITALS
SYSTOLIC BLOOD PRESSURE: 132 MMHG | RESPIRATION RATE: 17 BRPM | HEART RATE: 79 BPM | OXYGEN SATURATION: 97 % | TEMPERATURE: 97.4 F | DIASTOLIC BLOOD PRESSURE: 74 MMHG

## 2023-04-25 ENCOUNTER — OFFICE VISIT (OUTPATIENT)
Dept: WOUND CARE | Facility: HOSPITAL | Age: 88
End: 2023-04-25

## 2023-04-25 ENCOUNTER — HOME CARE VISIT (OUTPATIENT)
Dept: HOME HEALTH SERVICES | Facility: HOME HEALTHCARE | Age: 88
End: 2023-04-25

## 2023-04-25 VITALS
RESPIRATION RATE: 12 BRPM | HEART RATE: 88 BPM | TEMPERATURE: 97.1 F | SYSTOLIC BLOOD PRESSURE: 148 MMHG | DIASTOLIC BLOOD PRESSURE: 70 MMHG

## 2023-04-25 VITALS — SYSTOLIC BLOOD PRESSURE: 150 MMHG | DIASTOLIC BLOOD PRESSURE: 90 MMHG | OXYGEN SATURATION: 99 % | HEART RATE: 76 BPM

## 2023-04-25 DIAGNOSIS — I87.311 CHRONIC VENOUS HYPERTENSION (IDIOPATHIC) WITH ULCER OF RIGHT LOWER EXTREMITY (HCC): Primary | ICD-10-CM

## 2023-04-25 DIAGNOSIS — L97.919 CHRONIC VENOUS HYPERTENSION (IDIOPATHIC) WITH ULCER OF RIGHT LOWER EXTREMITY (HCC): Primary | ICD-10-CM

## 2023-04-25 RX ORDER — LIDOCAINE 40 MG/G
CREAM TOPICAL ONCE
Status: COMPLETED | OUTPATIENT
Start: 2023-04-25 | End: 2023-04-25

## 2023-04-25 RX ADMIN — LIDOCAINE 1 APPLICATION.: 40 CREAM TOPICAL at 13:43

## 2023-04-25 NOTE — PROGRESS NOTES
"Patient ID: Brittanie Torres is a 81 y o  male Date of Birth 1935     Chief Complaint  Chief Complaint   Patient presents with   • Follow Up Wound Care Visit     RLE wound       Allergies  Tramadol    Assessment:    No problem-specific Assessment & Plan notes found for this encounter  Diagnoses and all orders for this visit:    Chronic venous hypertension (idiopathic) with ulcer of right lower extremity (HCC)  -     lidocaine (LMX) 4 % cream  -     Wound cleansing and dressings; Future  -     Wound compression and edema control; Future  -     Wound home care; Future  -     Debridement  -     Debridement              Debridement   Wound 01/13/23 Ankle Right;Medial    Universal Protocol:  Consent: Verbal consent obtained  Consent given by: patient  Time out: Immediately prior to procedure a \"time out\" was called to verify the correct patient, procedure, equipment, support staff and site/side marked as required  Timeout called at: 4/25/2023 1:38 PM   Patient understanding: patient states understanding of the procedure being performed  Patient identity confirmed: verbally with patient      Performed by: physician  Debridement type: surgical  Level of debridement: subcutaneous tissue  Pain control: lidocaine 4%  Post-debridement measurements  Length (cm): 4  Width (cm): 2 5  Depth (cm): 0 1  Percent debrided: 100%  Surface Area (cm^2): 10  Area debrided (cm^2): 10  Volume (cm^3): 1  Tissue and other material debrided: subcutaneous tissue  Devitalized tissue debrided: exudate and slough  Instrument(s) utilized: curette  Bleeding: small  Hemostasis obtained with: pressure  Response to treatment: procedure was tolerated well      Debridement   Wound 03/03/23 Leg Right;Medial    Universal Protocol:  Consent: Verbal consent obtained    Consent given by: patient  Time out: Immediately prior to procedure a \"time out\" was called to verify the correct patient, procedure, equipment, support staff and site/side marked as " required  Timeout called at: 4/25/2023 1:38 PM   Patient understanding: patient states understanding of the procedure being performed  Patient identity confirmed: verbally with patient      Performed by: physician  Debridement type: surgical  Level of debridement: subcutaneous tissue  Pain control: lidocaine 4%  Post-debridement measurements  Length (cm): 8 2  Width (cm): 5  Depth (cm): 0 2  Percent debrided: 75%  Surface Area (cm^2): 41  Area debrided (cm^2): 30 75  Volume (cm^3): 8 2  Tissue and other material debrided: subcutaneous tissue  Devitalized tissue debrided: exudate and slough  Instrument(s) utilized: curette  Bleeding: small  Hemostasis obtained with: pressure  Response to treatment: procedure was tolerated well          Plan:  Wounds are improved  Debrided as above  Wound management with silver alginate, see wound orders below  Continue compression with Tubigrip  Keep legs elevated whenever seated and avoid prolonged standing  Follow-up in 2 weeks or call sooner with questions or concerns    Wound 01/13/23 Ankle Right;Medial (Active)   Wound Image Images linked 04/25/23 1354   Wound Description Granulation tissue; Yellow;Slough;Pink;Epithelialization 04/25/23 1330   Deedee-wound Assessment Intact 04/25/23 1330   Wound Length (cm) 4 cm 04/25/23 1330   Wound Width (cm) 2 5 cm 04/25/23 1330   Wound Depth (cm) 0 1 cm 04/25/23 1330   Wound Surface Area (cm^2) 10 cm^2 04/25/23 1330   Wound Volume (cm^3) 1 cm^3 04/25/23 1330   Calculated Wound Volume (cm^3) 1 cm^3 04/25/23 1330   Change in Wound Size % 54 13 04/25/23 1330   Drainage Amount Large 04/25/23 1330   Drainage Description Serosanguineous 04/25/23 1330   Non-staged Wound Description Full thickness 04/25/23 1330   Patient Tolerance Tolerated well 04/25/23 1330   Dressing Status Intact 04/25/23 1330       Wound 03/03/23 Leg Right;Medial (Active)   Wound Image Images linked 04/25/23 1353   Wound Description Epithelialization;Granulation tissue;Pink 04/25/23 1331   Deedee-wound Assessment Fragile 04/25/23 1331   Wound Length (cm) 8 2 cm 04/25/23 1331   Wound Width (cm) 5 cm 04/25/23 1331   Wound Depth (cm) 0 1 cm 04/25/23 1331   Wound Surface Area (cm^2) 41 cm^2 04/25/23 1331   Wound Volume (cm^3) 4 1 cm^3 04/25/23 1331   Calculated Wound Volume (cm^3) 4 1 cm^3 04/25/23 1331   Change in Wound Size % -925 04/25/23 1331   Drainage Amount Large 04/25/23 1331   Drainage Description Serosanguineous 04/25/23 1331   Non-staged Wound Description Full thickness 04/25/23 1331   Patient Tolerance Tolerated well 04/25/23 1331   Dressing Status Intact 04/25/23 1331       Wound 01/13/23 Ankle Right;Medial (Active)   Date First Assessed/Time First Assessed: 01/13/23 1308   Location: Ankle  Wound Location Orientation: Right;Medial       Wound 03/03/23 Leg Right;Medial (Active)   Date First Assessed/Time First Assessed: 03/03/23 1329   Location: Leg  Wound Location Orientation: Right;Medial       [REMOVED] Incision 08/17/18 Leg Right (Removed)   Resolved Date: (c) 09/05/22  Date First Assessed/Time First Assessed: 08/17/18 1127   Location: Leg  Wound Location Orientation: Right       [REMOVED] Wound 09/05/22 Surgical Abdomen Right (Removed)   Resolved Date: 10/25/22  Date First Assessed/Time First Assessed: 09/05/22 1505   Pre-Existing Wound: Yes  Primary Wound Type: Surgical  Location: Abdomen  Wound Location Orientation: Right  Wound Description (Comments): IR Drain tube       [REMOVED] Wound 10/31/22 Surgical Abdomen Right;Quadrant; Upper (Removed)   Resolved Date: 11/30/22  Date First Assessed/Time First Assessed: 10/31/22 1422   Pre-Existing Wound: Yes  Primary Wound Type: Surgical  Location: Abdomen  Wound Location Orientation: Right;Quadrant; Upper  Wound Description (Comments): IR Gail tube p         [REMOVED] Wound 11/09/22 Venous Ulcer Ankle Right;Medial (Removed)   Resolved Date: 11/30/22  Date First Assessed/Time First Assessed: 11/09/22 2200   Pre-Existing Wound: No Primary Wound Type: Venous Ulcer  Location: Ankle  Wound Location Orientation: Right;Medial  Dressing Status: Clean;Dry; Intact  Wound Outcome: He    [REMOVED] Wound 11/11/22 Pressure Injury Buttocks Right; Inner (Removed)   Resolved Date: 11/30/22  Date First Assessed/Time First Assessed: 11/11/22 1200   Pre-Existing Wound: No  Primary Wound Type: Pressure Injury  Location: Buttocks  Wound Location Orientation: Right; Inner  Wound Description (Comments): stage 2 pressure     [REMOVED] Wound 12/16/22 Other (comment) Leg Inner;Right (Removed)   Resolved Date: 03/03/23  Date First Assessed/Time First Assessed: 12/16/22 0909   Pre-Existing Wound: No  Primary Wound Type: Other (comment)  Location: Leg  Wound Location Orientation: Inner;Right  Dressing Status: Clean;Dry; Intact  Wound Outcome: Ot    [REMOVED] Wound 02/17/23 Venous Ulcer Leg Right; Lower;Mid;Anterior (Removed)   Resolved Date: 03/24/23  Date First Assessed/Time First Assessed: 02/17/23 1433   Primary Wound Type: Venous Ulcer  Location: Leg  Wound Location Orientation: Right; Lower;Mid;Anterior  Wound Outcome: Healed       Subjective:        Patient presents for follow-up of right lower extremity venous ulcers  Patient was hospitalized in the last time he was here  Was followed by wound nurses while inpatient  Has been applying Acticoat and alginate to the wounds as well as Tubigrip's for compression        The following portions of the patient's history were reviewed and updated as appropriate:   He  has a past medical history of Anemia, Arthritis, Atrial fibrillation ([facility] ), Basal cell carcinoma (03/22/2022), [facility] Diabetes mellitus ([facility] ), DVT (deep vein thrombosis) in pregnancy (1966), DVT (deep venous thrombosis) ([facility] [address] ) (1966), Dyslipidemia, Encephalopathy acute (11/4/2022), GERD (gastroesophageal reflux disease), Hyperlipidemia, Hypertension, Hypomagnesemia (10/19/2022), Irregular heart beat, Morbid obesity due to excess calories ([place] [address] ), Pulmonary embolism ([place] [address] ), Sepsis ([place] [address] ), Squamous cell skin cancer (07/30/2020), and Visual impairment    He   Patient Active Problem List    Diagnosis Date Noted   • 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   • Pneumonia of both lungs due to infectious organism 04/05/2023   • COVID-19 04/05/2023   • Pleural effusion on right 04/05/2023   • Calculus of gallbladder 03/08/2023   • Cholecystostomy care ([place] [address] ) 11/21/2022   • Gout 11/21/2022   • Acute on chronic cholecystitis 11/04/2022   • Acute cholecystitis 08/30/2022   • Benign prostatic hyperplasia with lower urinary tract symptoms 08/09/2022   • OBINNA (obstructive sleep apnea)    • CKD (chronic kidney disease) stage 3, GFR 30-59 ml/min ([place] [address] ) 05/07/2022   • Acute kidney injury ([place] [address] ) 11/18/2021   • Osteoarthritis, knee 11/15/2021   • Status post right knee replacement 11/15/2021   • Mixed hyperlipidemia 03/29/2021   • Swelling of limb 02/04/2021   • Chronic deep vein thrombosis (DVT) ([place] [address] ) 09/10/2020   • Secondary lymphedema 09/24/2018   • Venous ulcer of ankle, right ([place] [address] ) 07/11/2018   • S/P AVR 03/22/2018   • Thrombophlebitis 03/22/2018   • Presence of implantable cardioverter-defibrillator (ICD) 03/22/2018   • Chronic bilateral low back pain without sciatica 04/24/2017   • BPH (benign prostatic hyperplasia) 04/20/2017   • Obesity, unspecified obesity severity, unspecified obesity type 04/04/2017   • Hydrocele 04/04/2017   • Chronic anticoagulation 04/04/2017   • Anemia 04/04/2017   • Renal cyst 04/04/2017   • 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 08/11/2015   • Ventricular tachycardia 04/15/2014   • Paroxysmal atrial fibrillation ([place] [address] ) 03/19/2014   • Edema 03/19/2014   • Chronic diastolic CHF (congestive heart failure) ([place] [address] ) 03/19/2014   • Endocarditis 03/19/2014   • Type 2 diabetes mellitus with diabetic neuropathy, without long-term current use of insulin [facility] ) 08/19/2013   • Peripheral venous insufficiency 08/19/2013 • Cardiomegaly 08/19/2013   • PVD (peripheral vascular disease) ([facility] [address] ) 04/19/2013   • Dyslipidemia 04/19/2013     He  reports that he has never smoked  He has never used smokeless tobacco  He reports that he does not currently use alcohol  He reports that he does not use drugs  Current Outpatient Medications   Medication Sig Dispense Refill   • Acetaminophen (TYLENOL EXTRA STRENGTH PO) Take 500 mg by mouth if needed (for pain as needed)  Indications: pain as needed     • albuterol (PROVENTIL HFA,VENTOLIN HFA) 90 mcg/act inhaler Inhale 2 puffs every 6 (six) hours as needed for wheezing or shortness of breath     • allopurinol (ZYLOPRIM) 100 mg tablet Take 1 tablet (100 mg total) by mouth daily Do not start before November 15, 2022  (Patient not taking: Reported on 4/8/2023)     • atorvastatin (LIPITOR) 40 mg tablet Take 40 mg by mouth daily after dinner Atorvastatin Calcium 40 MG Oral Tablet Take 1 tablet daily  Refills: 0  Active      • B Complex-C (SUPER B COMPLEX PO) Take 1 capsule by mouth daily      • benzonatate (TESSALON PERLES) 100 mg capsule Take 1 capsule (100 mg total) by mouth 3 (three) times a day as needed for cough 20 capsule 0   • candesartan-hydrochlorothiazide (ATACAND HCT) 16-12 5 MG per tablet Take 1 tablet by mouth daily  Indications: High Blood Pressure Disorder     • Cholecalciferol 25 MCG (1000 UT) capsule Take 3,000 Units by mouth daily  Indications: Vitamin D Deficiency     • Diclofenac Sodium (VOLTAREN) 1 % Apply 2 g topically 4 (four) times a day     • fluticasone (FLONASE) 50 mcg/act nasal spray 2 sprays into each nostril daily as needed Shake liquid and spray     • gabapentin (NEURONTIN) 100 mg capsule Take 100 mg by mouth daily     • Iron Combinations (NIFEREX) TABS Take 1 tablet by mouth in the morning  5   • lubiprostone (AMITIZA) 24 mcg capsule Take 24 mcg by mouth 2 (two) times a day with meals   Indications: Chronic Constipation of Unknown Cause     • metFORMIN (GLUCOPHAGE) 1000 MG tablet Take 1,000 mg by mouth daily with dinner  Indications: Type 2 Diabetes     • mirtazapine (REMERON) 7 5 MG tablet Take 15 mg by mouth daily at bedtime ordered by Dr Hoffmann Meter     • Multiple Vitamin (MULTIVITAMINS PO) Take 1 tablet by mouth daily     • omeprazole (PriLOSEC) 20 mg delayed release capsule Omeprazole 20 MG Oral Tablet Delayed Release Take 1 tablet daily  Refills: 0  Active     • polyethylene glycol (GLYCOLAX) 17 GM/SCOOP powder Take 17 g by mouth 2 (two) times a day     • Potassium Chloride ER 20 MEQ TBCR Take 20 mEq by mouth in the morning  Indications: Low Amount of Potassium in the Blood     • senna-docusate sodium (SENOKOT S) 8 6-50 mg per tablet Take 1 tablet by mouth daily at bedtime 30 tablet 0   • sodium chloride, PF, 0 9 % 10 mL by Intracatheter route daily Intracatheter flushing daily  May substitute prefilled syringe with normal saline 10 mL vials, 10 mL syringes, and 18 g blunt needles (Patient taking differently: 10 mL by Intracatheter route every 12 (twelve) hours Intracatheter flushing daily  May substitute prefilled syringe with normal saline 10 mL vials, 10 mL syringes, and 18 g blunt needles) 300 mL 2   • sodium chloride, PF, 0 9 % 10 mL by Intracatheter route daily for 120 doses Intracatheter flushing daily  May substitute prefilled syringe with normal saline 10 mL vials, 10 mL syringes, and 18 g blunt needles 300 mL 3   • sotalol (BETAPACE) 80 mg tablet Take 1 tablet (80 mg total) by mouth daily  0   • torsemide (DEMADEX) 20 mg tablet Take 20 mg by mouth daily and 10 mg in PM     • warfarin (COUMADIN) 2 mg tablet Take 2 tablets (4 mg total) by mouth daily Acknowledge (Patient taking differently: Take 4 mg by mouth daily  Pt taking 6mg Tue/Thur/Sat and 4 mg M/W/F/Sun  Indications: Atrial Fibrillation) 14 tablet 0     No current facility-administered medications for this visit       Facility-Administered Medications Ordered in Other Visits   Medication Dose Route Frequency Provider Last Rate Last Admin   • cefTRIAXone (ROCEPHIN) 2,000 mg in dextrose 5 % 50 mL IVPB  2,000 mg Intravenous Once Nancy Blackmon MD       • sodium chloride 0 9 % infusion  75 mL/hr Intravenous Continuous Nancy Blackmon MD 75 mL/hr at 03/08/23 1000 75 mL/hr at 03/08/23 1000     He is allergic to tramadol       Review of Systems   Constitutional: Negative for chills and fever  HENT: Negative for congestion and sneezing  Respiratory: Negative for cough  Cardiovascular: Positive for leg swelling  Musculoskeletal: Positive for gait problem  Skin: Positive for wound  Psychiatric/Behavioral: Negative for agitation  Objective:       Wound 01/13/23 Ankle Right;Medial (Active)   Wound Image Images linked 04/25/23 1354   Wound Description Granulation tissue; Yellow;Slough;Pink;Epithelialization 04/25/23 1330   Deedee-wound Assessment Intact 04/25/23 1330   Wound Length (cm) 4 cm 04/25/23 1330   Wound Width (cm) 2 5 cm 04/25/23 1330   Wound Depth (cm) 0 1 cm 04/25/23 1330   Wound Surface Area (cm^2) 10 cm^2 04/25/23 1330   Wound Volume (cm^3) 1 cm^3 04/25/23 1330   Calculated Wound Volume (cm^3) 1 cm^3 04/25/23 1330   Change in Wound Size % 54 13 04/25/23 1330   Drainage Amount Large 04/25/23 1330   Drainage Description Serosanguineous 04/25/23 1330   Non-staged Wound Description Full thickness 04/25/23 1330   Patient Tolerance Tolerated well 04/25/23 1330   Dressing Status Intact 04/25/23 1330       Wound 03/03/23 Leg Right;Medial (Active)   Wound Image Images linked 04/25/23 1353   Wound Description Epithelialization;Granulation tissue;Pink 04/25/23 1331   Deedee-wound Assessment Fragile 04/25/23 1331   Wound Length (cm) 8 2 cm 04/25/23 1331   Wound Width (cm) 5 cm 04/25/23 1331   Wound Depth (cm) 0 1 cm 04/25/23 1331   Wound Surface Area (cm^2) 41 cm^2 04/25/23 1331   Wound Volume (cm^3) 4 1 cm^3 04/25/23 1331   Calculated Wound Volume (cm^3) 4 1 cm^3 04/25/23 1331   Change in Wound Size % -925 04/25/23 1331   Drainage Amount Large 04/25/23 1331   Drainage Description Serosanguineous 04/25/23 1331   Non-staged Wound Description Full thickness 04/25/23 1331   Patient Tolerance Tolerated well 04/25/23 1331   Dressing Status Intact 04/25/23 1331       /70   Pulse 88   Temp (!) 97 1 °F (36 2 °C)   Resp 12     Physical Exam        Wound 01/13/23 Ankle Right;Medial (Active)   Wound Image   04/25/23 1354   Wound Description Granulation tissue; Yellow;Slough;Pink;Epithelialization 04/25/23 1330   Deedee-wound Assessment Intact 04/25/23 1330   Wound Length (cm) 4 cm 04/25/23 1330   Wound Width (cm) 2 5 cm 04/25/23 1330   Wound Depth (cm) 0 1 cm 04/25/23 1330   Wound Surface Area (cm^2) 10 cm^2 04/25/23 1330   Wound Volume (cm^3) 1 cm^3 04/25/23 1330   Calculated Wound Volume (cm^3) 1 cm^3 04/25/23 1330   Change in Wound Size % 54 13 04/25/23 1330   Drainage Amount Large 04/25/23 1330   Drainage Description Serosanguineous 04/25/23 1330   Non-staged Wound Description Full thickness 04/25/23 1330   Patient Tolerance Tolerated well 04/25/23 1330   Dressing Status Intact 04/25/23 1330       Wound 03/03/23 Leg Right;Medial (Active)   Wound Image   04/25/23 1353   Wound Description Epithelialization;Granulation tissue;Pink 04/25/23 1331   Deedee-wound Assessment Fragile 04/25/23 1331   Wound Length (cm) 8 2 cm 04/25/23 1331   Wound Width (cm) 5 cm 04/25/23 1331   Wound Depth (cm) 0 1 cm 04/25/23 1331   Wound Surface Area (cm^2) 41 cm^2 04/25/23 1331   Wound Volume (cm^3) 4 1 cm^3 04/25/23 1331   Calculated Wound Volume (cm^3) 4 1 cm^3 04/25/23 1331   Change in Wound Size % -925 04/25/23 1331   Drainage Amount Large 04/25/23 1331   Drainage Description Serosanguineous 04/25/23 1331   Non-staged Wound Description Full thickness 04/25/23 1331   Patient Tolerance Tolerated well 04/25/23 1331   Dressing Status Intact 04/25/23 1331                       Wound Instructions:  Orders Placed This Encounter   Procedures   • Wound cleansing and dressings     Wound location: Right ankle and Right lower leg medial  Change dressing 3 times a week and as needed  You may remove the dressing and shower  Do not leave wound open to air, apply new dressing immediately  Cleanse the wounds with prophase prior to applying a clean dressing, rinse well  Do not use tissue or cotton balls  Do not scrub the wounds  Pat dry using gauze  Apply Zinc cream to intact skin  Apply Alginate Ag to the open wounds  Cover with Abd pad  Secure with juanjo and tape  Apply Spandagrip G on Right lower leg    Above treatment performed at wound care center     Standing Status:   Future     Standing Expiration Date:   4/25/2024   • Wound compression and edema control     Elastic Tubular Stocking Size G  Tubular elastic bandage: Apply from base of toes to behind the knee  Apply in AM, may remove for sleep  Avoid prolonged standing in one place  Elevate leg(s) above the level of the heart when sitting or as much as possible  Continue use of compression pumps at home     Standing Status:   Future     Standing Expiration Date:   4/25/2024   • Wound home care     Continue SL VNA to change dressing     Standing Status:   Future     Standing Expiration Date:   4/25/2024   • Debridement     This order was created via procedure documentation   • Debridement     This order was created via procedure documentation        Diagnosis ICD-10-CM Associated Orders   1   Chronic venous hypertension (idiopathic) with ulcer of right lower extremity (HCC)  I87 [id number] lidocaine (LMX) 4 % cream    L97 [id number] Wound cleansing and dressings     Wound compression and edema control     Wound home care     Debridement     Debridement

## 2023-04-25 NOTE — PATIENT INSTRUCTIONS
Orders Placed This Encounter   Procedures    Wound cleansing and dressings     Wound location: Right ankle and Right lower leg medial  Change dressing 3 times a week and as needed  You may remove the dressing and shower  Do not leave wound open to air, apply new dressing immediately  Cleanse the wounds with prophase prior to applying a clean dressing, rinse well  Do not use tissue or cotton balls  Do not scrub the wounds  Pat dry using gauze  Apply Zinc cream to intact skin  Apply Alginate Ag to the open wounds  Cover with Abd pad  Secure with juanjo and tape  Apply Spandagrip G on Right lower leg    Above treatment performed at wound care center     Standing Status:   Future     Standing Expiration Date:   4/25/2024    Wound compression and edema control     Elastic Tubular Stocking Size G  Tubular elastic bandage: Apply from base of toes to behind the knee  Apply in AM, may remove for sleep  Avoid prolonged standing in one place  Elevate leg(s) above the level of the heart when sitting or as much as possible    Continue use of compression pumps at home     Standing Status:   Future     Standing Expiration Date:   4/25/2024    Wound home care     Continue SL VNA to change dressing     Standing Status:   Future     Standing Expiration Date:   4/25/2024     Lungs slight for something in the notes like every once a while it feels like my legs  Also her last  Okay I put in bilateral okay care

## 2023-04-26 ENCOUNTER — HOME CARE VISIT (OUTPATIENT)
Dept: HOME HEALTH SERVICES | Facility: HOME HEALTHCARE | Age: 88
End: 2023-04-26

## 2023-04-27 ENCOUNTER — OFFICE VISIT (OUTPATIENT)
Dept: CARDIOLOGY CLINIC | Facility: CLINIC | Age: 88
End: 2023-04-27

## 2023-04-27 ENCOUNTER — HOME CARE VISIT (OUTPATIENT)
Dept: HOME HEALTH SERVICES | Facility: HOME HEALTHCARE | Age: 88
End: 2023-04-27

## 2023-04-27 VITALS — SYSTOLIC BLOOD PRESSURE: 140 MMHG | DIASTOLIC BLOOD PRESSURE: 78 MMHG | HEART RATE: 96 BPM | OXYGEN SATURATION: 98 %

## 2023-04-27 VITALS
WEIGHT: 315 LBS | HEIGHT: 73 IN | DIASTOLIC BLOOD PRESSURE: 78 MMHG | SYSTOLIC BLOOD PRESSURE: 132 MMHG | HEART RATE: 82 BPM | BODY MASS INDEX: 41.75 KG/M2

## 2023-04-27 DIAGNOSIS — Z95.2 S/P AVR: ICD-10-CM

## 2023-04-27 DIAGNOSIS — I50.32 CHRONIC DIASTOLIC CHF (CONGESTIVE HEART FAILURE) (HCC): Chronic | ICD-10-CM

## 2023-04-27 DIAGNOSIS — I73.9 PVD (PERIPHERAL VASCULAR DISEASE) (HCC): ICD-10-CM

## 2023-04-27 DIAGNOSIS — E78.5 DYSLIPIDEMIA: ICD-10-CM

## 2023-04-27 DIAGNOSIS — I48.0 PAROXYSMAL ATRIAL FIBRILLATION (HCC): Primary | Chronic | ICD-10-CM

## 2023-04-27 RX ORDER — METOLAZONE 2.5 MG/1
2.5 TABLET ORAL 2 TIMES WEEKLY
Qty: 30 TABLET | Refills: 5 | Status: SHIPPED | OUTPATIENT
Start: 2023-04-27

## 2023-04-27 NOTE — PROGRESS NOTES
Cardiology   Melchor Shepard 81 y o  male MRN: [id number]        Impression:  1  s/p aortic valve replacement for endocarditis - doing well  Echo 7/12 Nml LV systolic function and normal bio AVR  2  Hypertension - improved control    3  s/p ICD for ventricular tachycardia  4  DVT - on warfarin   5  Paroxysmal atrial fibrillation - Paced   on Sotalol (was decreased to daily when in hospital)  On anticoagulation    6  Chronic diastolic heart failure/PH/RV failure - worsening  7  Cholecystitis - had abscess formation around site of perc jeny drain  8  Recent COVID pneumonia       Recommendations:  1  Start metolazone 2 5mg 2x/week on Saturday/Thursday  Take 30 minutes before AM torsemide  2  Continue remainder of medications  3  Check basic metabolic profile in 2 weeks  4  Follow up in one month  HPI: Melchor Shepard is a 81y o  year old male with morbid obesity, diastolic HF with PH/RV failure, hypertension,  aortic valve replacement for endocarditis who returns for follow up  Admitted to hospital 5/22 with dyspnea/fluid overload - had preserved LV LV systolic function, mild-mod MR, mod TR, but elevated PAP and RV dysfunction   Recently had cholecystostomy tube removed - was treated for abscess 3 weeks ago  Also admitted with COVID pneumonia 3 weeks ago  Does have dyspnea and significant LE edema  Review of Systems   Constitutional: Negative  HENT: Negative  Eyes: Negative  Respiratory: Positive for shortness of breath  Negative for chest tightness  Cardiovascular: Positive for leg swelling  Negative for chest pain and palpitations  Gastrointestinal: Negative  Endocrine: Negative  Genitourinary: Negative  Musculoskeletal: Negative  Skin: Negative  Allergic/Immunologic: Negative  Neurological: Negative  Hematological: Negative  Psychiatric/Behavioral: Negative  All other systems reviewed and are negative          Past Medical History:   Diagnosis Date • Anemia    • Arthritis    • Atrial fibrillation (HCC)    • Basal cell carcinoma 03/22/2022    Tip of Nose   • CHF (congestive heart failure) (HCC)    • Diabetes mellitus ([facility] )     Niddm   • DVT (deep vein thrombosis) in pregnancy 1966    not in pregnancy   • DVT (deep venous thrombosis) ([facility] ) 1966   • Dyslipidemia    • Encephalopathy acute 11/4/2022   • GERD (gastroesophageal reflux disease)    • Hyperlipidemia    • Hypertension    • Hypomagnesemia 10/19/2022   • Irregular heart beat     Afib   • Morbid obesity due to excess calories ([facility] )     Resolved 9/2/2014    • Pulmonary embolism (HCC)    • [facility]eps[facility] )    • Squamous cell skin cancer 07/30/2020    Left posterior scalp   • Visual impairment      Past Surgical History:   Procedure Laterality Date   • AORTIC VALVE REPLACEMENT     • CARDIAC DEFIBRILLATOR PLACEMENT  04/2014   • CARDIAC SURGERY  02/2014    AVR   • COLONOSCOPY     • INSERT / REPLACE / REMOVE PACEMAKER     • IR ASPIRATION BAKERS CYST  3/8/2023   • IR CHOLECYSTOSTOMY TUBE CHECK/CHANGE/REPOSITION/REINSERTION/UPSIZE  10/13/2022   • IR CHOLECYSTOSTOMY TUBE CHECK/CHANGE/REPOSITION/REINSERTION/UPSIZE  10/19/2022   • IR CHOLECYSTOSTOMY TUBE CHECK/CHANGE/REPOSITION/REINSERTION/UPSIZE  10/27/2022   • IR CHOLECYSTOSTOMY TUBE CHECK/CHANGE/REPOSITION/REINSERTION/UPSIZE  11/7/2022   • IR CHOLECYSTOSTOMY TUBE CHECK/CHANGE/REPOSITION/REINSERTION/UPSIZE  11/10/2022   • IR CHOLECYSTOSTOMY TUBE CHECK/CHANGE/REPOSITION/REINSERTION/UPSIZE  12/1/2022   • IR CHOLECYSTOSTOMY TUBE CHECK/CHANGE/REPOSITION/REINSERTION/UPSIZE  1/6/2023   • IR CHOLECYSTOSTOMY TUBE CHECK/CHANGE/REPOSITION/REINSERTION/UPSIZE  1/24/2023   • IR CHOLECYSTOSTOMY TUBE CHECK/CHANGE/REPOSITION/REINSERTION/UPSIZE  3/15/2023   • IR CHOLECYSTOSTOMY TUBE PLACEMENT  9/1/2022   • IR DRAINAGE TUBE CHECK AND/OR REMOVAL  3/22/2023   • IR DRAINAGE TUBE CHECK AND/OR REMOVAL  4/10/2023   • IR DRAINAGE TUBE PLACEMENT  4/6/2023   • JOINT REPLACEMENT Left     LTKR • MOHS SURGERY  07/30/2020    Left posterior scalp, Dr Radha Simms   • [phone]  04/18/2022    [facility] Tip of Nose- Dr Radha Simms   • Tran Marten DIVJ&STRIP LONG SAPH SAPHFEM Ralene Page Right 8/17/2018    Procedure: LEG PERFORATED INJECTION SCLEROTHERAPY;  Surgeon: Tomás Gomez MD;  Location: [facility];  Service: Vascular   • REPLACEMENT TOTAL KNEE Right    • TOTAL KNEE ARTHROPLASTY Left    • VASCULAR SURGERY     • VENA CAVA FILTER PLACEMENT      Interruption inferior vena cava, [place] filter, placement   • WISDOM TOOTH EXTRACTION       Social History     Substance and Sexual Activity   Alcohol Use Not Currently    Comment: no alcohol in 28 yrs     Social History     Substance and Sexual Activity   Drug Use Never     Social History     Tobacco Use   Smoking Status Never   Smokeless Tobacco Never     Family History   Problem Relation Age of Onset   • Arthritis Mother    • Stroke Mother    • Arthritis Father    • Cancer Father    • Arthritis Daughter        Allergies: Allergies   Allergen Reactions   • Tramadol Other (See Comments)     intolerance       Medications:     Current Outpatient Medications:   •  Acetaminophen (TYLENOL EXTRA STRENGTH PO), Take 500 mg by mouth if needed (for pain as needed)  Indications: pain as needed, Disp: , Rfl:   •  albuterol (PROVENTIL HFA,VENTOLIN HFA) 90 mcg/act inhaler, Inhale 2 puffs every 6 (six) hours as needed for wheezing or shortness of breath, Disp: , Rfl:   •  atorvastatin (LIPITOR) 40 mg tablet, Take 40 mg by mouth daily after dinner Atorvastatin Calcium 40 MG Oral Tablet Take 1 tablet daily  Refills: 0  Active , Disp: , Rfl:   •  B Complex-C (SUPER B COMPLEX PO), Take 1 capsule by mouth daily , Disp: , Rfl:   •  benzonatate (TESSALON PERLES) 100 mg capsule, Take 1 capsule (100 mg total) by mouth 3 (three) times a day as needed for cough, Disp: 20 capsule, Rfl: 0  •  candesartan-hydrochlorothiazide (ATACAND HCT) 16-12 5 MG per tablet, Take 1 tablet by mouth daily  Indications: High Blood Pressure Disorder, Disp: , Rfl:   •  Cholecalciferol 25 MCG (1000 UT) capsule, Take 3,000 Units by mouth daily  Indications: Vitamin D Deficiency, Disp: , Rfl:   •  Diclofenac Sodium (VOLTAREN) 1 %, Apply 2 g topically 4 (four) times a day, Disp: , Rfl:   •  fluticasone (FLONASE) 50 mcg/act nasal spray, 2 sprays into each nostril daily as needed Shake liquid and spray, Disp: , Rfl:   •  gabapentin (NEURONTIN) 100 mg capsule, Take 100 mg by mouth daily, Disp: , Rfl:   •  Iron Combinations (NIFEREX) TABS, Take 1 tablet by mouth in the morning, Disp: , Rfl: 5  •  lubiprostone (AMITIZA) 24 mcg capsule, Take 24 mcg by mouth 2 (two) times a day with meals  Indications: Chronic Constipation of Unknown Cause, Disp: , Rfl:   •  metFORMIN (GLUCOPHAGE) 1000 MG tablet, Take 1,000 mg by mouth daily with dinner  Indications: Type 2 Diabetes, Disp: , Rfl:   •  mirtazapine (REMERON) 7 5 MG tablet, Take 15 mg by mouth daily at bedtime ordered by Dr Pantera Royal, Disp: , Rfl:   •  Multiple Vitamin (MULTIVITAMINS PO), Take 1 tablet by mouth daily, Disp: , Rfl:   •  omeprazole (PriLOSEC) 20 mg delayed release capsule, Omeprazole 20 MG Oral Tablet Delayed Release Take 1 tablet daily  Refills: 0  Active, Disp: , Rfl:   •  polyethylene glycol (GLYCOLAX) 17 GM/SCOOP powder, Take 17 g by mouth 2 (two) times a day, Disp: , Rfl:   •  Potassium Chloride ER 20 MEQ TBCR, Take 20 mEq by mouth in the morning  Indications: Low Amount of Potassium in the Blood, Disp: , Rfl:   •  senna-docusate sodium (SENOKOT S) 8 6-50 mg per tablet, Take 1 tablet by mouth daily at bedtime, Disp: 30 tablet, Rfl: 0  •  sodium chloride, PF, 0 9 %, 10 mL by Intracatheter route daily for 120 doses Intracatheter flushing daily   May substitute prefilled syringe with normal saline 10 mL vials, 10 mL syringes, and 18 g blunt needles, Disp: 300 mL, Rfl: 3  •  sotalol (BETAPACE) 80 mg tablet, Take 1 tablet (80 mg total) by mouth daily, Disp: , Rfl: 0  •  torsemide (DEMADEX) 20 mg tablet, Take 20 mg by mouth daily and 10 mg in PM, Disp: , Rfl:   •  warfarin (COUMADIN) 2 mg tablet, Take 2 tablets (4 mg total) by mouth daily Acknowledge (Patient taking differently: Take 4 mg by mouth daily Pt taking 6mg Tue/Thur/Sat and 4 mg M/W/F/Sun), Disp: 14 tablet, Rfl: 0  •  allopurinol (ZYLOPRIM) 100 mg tablet, Take 1 tablet (100 mg total) by mouth daily Do not start before November 15, 2022  (Patient not taking: Reported on 4/8/2023), Disp: , Rfl:   •  sodium chloride, PF, 0 9 %, 10 mL by Intracatheter route daily Intracatheter flushing daily  May substitute prefilled syringe with normal saline 10 mL vials, 10 mL syringes, and 18 g blunt needles (Patient taking differently: 10 mL by Intracatheter route every 12 (twelve) hours Intracatheter flushing daily  May substitute prefilled syringe with normal saline 10 mL vials, 10 mL syringes, and 18 g blunt needles), Disp: 300 mL, Rfl: 2  No current facility-administered medications for this visit  Facility-Administered Medications Ordered in Other Visits:   •  cefTRIAXone (ROCEPHIN) 2,000 mg in dextrose 5 % 50 mL IVPB, 2,000 mg, Intravenous, Once, Emily Lovell MD  •  sodium chloride 0 9 % infusion, 75 mL/hr, Intravenous, Continuous, Emily Lovell MD, Last Rate: 75 mL/hr at 03/08/23 1000, 75 mL/hr at 03/08/23 1000      Wt Readings from Last 3 Encounters:   04/27/23 (!) 147 kg (323 lb)   04/08/23 (!) 142 kg (312 lb)   04/07/23 (!) 147 kg (323 lb 1 6 oz)     Temp Readings from Last 3 Encounters:   04/25/23 (!) 97 1 °F (36 2 °C)   04/24/23 (!) 97 4 °F (36 3 °C) (Temporal)   04/20/23 97 8 °F (36 6 °C) (Temporal)     BP Readings from Last 3 Encounters:   04/27/23 132/78   04/27/23 140/78   04/25/23 150/90     Pulse Readings from Last 3 Encounters:   04/27/23 82   04/27/23 96   04/25/23 76         Physical Exam  HENT:      Head: Atraumatic        Mouth/Throat:      Mouth: Mucous membranes are moist    Eyes:      Extraocular Movements: Extraocular movements intact  Cardiovascular:      Rate and Rhythm: Normal rate  Rhythm irregularly irregular  Heart sounds: Murmur heard  Systolic murmur is present with a grade of 2/6  Pulmonary:      Breath sounds: Normal breath sounds  Abdominal:      General: Abdomen is flat  Musculoskeletal:         General: Normal range of motion  Cervical back: Normal range of motion  Right lower le+ Pitting Edema present  Left lower le+ Pitting Edema present  Skin:     General: Skin is warm  Neurological:      General: No focal deficit present  Mental Status: He is alert and oriented to person, place, and time  Psychiatric:         Mood and Affect: Mood normal          Behavior: Behavior normal            Laboratory Studies:  CMP:  Lab Results   Component Value Date     10/10/2015    K 4 4 2023     2023    CO2 23 2023    ANIONGAP 8 10/10/2015    BUN 28 (H) 2023    CREATININE 1 38 (H) 2023    GLUCOSE 149 (H) 2023    AST 22 2023    ALT 11 2023    BILITOT 2 23 (H) 10/10/2015    EGFR 45 2023       Lipid Profile:   No results found for: CHOL  No results found for: HDL  No results found for: LDLCALC  No results found for: TRIG    Cardiac testing:   EKG reviewed personally: Afib 82 LBBB   Results for orders placed during the hospital encounter of 20    Echo complete with contrast if indicated    [facility] [address]  [address]  [phone]    Transthoracic Echocardiogram  2D, M-mode, Doppler, and Color Doppler    Study date:  2020    Patient: Amira Wolf  MR number: [id number]  Account number: [de-identified]  : 1935  Age: 81 years  Gender: Male  Status: Outpatient  [facility]: [address] [place] [address] [facility]  Height: 73 in  Weight: 351 3 lb  BP: 152/ 73 mmHg    Indications: PAF; Acute on chronic diastolic HF;s/p AVR     Diagnoses: I35 9 - Nonrheumatic aortic valve disorder, unspecified, I48 0 - Atrial fibrillation, I50 9 - Heart failure, unspecified    Sonographer:  ABBY Mccain  Interpreting Physician:  Mayra Dewitt MD  Primary Physician:  Zoya Celeste DO  Referring Physician:  Mahogany Candelario MD  Group:  [facility]  Cardiology Fellow:  Amy Melvin DO    SUMMARY    PROCEDURE INFORMATION:  This was a technically difficult study  Echocardiographic views were limited due to restricted patient mobility, poor patient compliance, poor acoustic window availability, decreased penetration, and lung interference  LEFT VENTRICLE:  Systolic function was normal  Ejection fraction was estimated to be 55 %  Although no diagnostic regional wall motion abnormality was identified, this possibility cannot be completely excluded on the basis of this study  Wall thickness was mildly to moderately increased  There was mild concentric hypertrophy  RIGHT VENTRICLE:  The ventricle was mildly dilated  Wall thickness was mildly increased  LEFT ATRIUM:  The atrium was mildly to moderately dilated  RIGHT ATRIUM:  The atrium was mildly to moderately dilated  MITRAL VALVE:  There was mild to moderate annular calcification  There was mild regurgitation  AORTIC VALVE:  A bioprosthesis was present  It exhibited normal function  The peak valve velocity was 197 cm/s  The mean valve velocity was 152 cm/s  Valve peak gradient was 16 mmHg  Valve mean gradient was 9 mmHg  Estimated aortic valve area (by VTI) was 2 16 cmï¾²  TRICUSPID VALVE:  There was mild regurgitation  Pulmonary artery systolic pressure was mildly increased  Estimated peak PA pressure was 40 mmHg  PULMONIC VALVE:  There was mild regurgitation  HISTORY: PRIOR HISTORY: HTN;s/p ICD;VT;SOB; Morbid obesity  PROCEDURE: The study was performed in the [address]  This was a routine study   The transthoracic approach was used  The study included complete 2D imaging, M-mode, complete spectral Doppler, and color Doppler  The  heart rate was 67 bpm, at the start of the study  Images were obtained from the parasternal, apical, subcostal, and suprasternal notch acoustic windows  Echocardiographic views were limited due to restricted patient mobility, poor patient  compliance, poor acoustic window availability, decreased penetration, and lung interference  This was a technically difficult study  LEFT VENTRICLE: Size was normal  Systolic function was normal  Ejection fraction was estimated to be 55 %  Although no diagnostic regional wall motion abnormality was identified, this possibility cannot be completely excluded on the basis  of this study  Wall thickness was mildly to moderately increased  There was mild concentric hypertrophy  RIGHT VENTRICLE: The ventricle was mildly dilated  Systolic function was low normal  Wall thickness was mildly increased  A pacing wire was present in the ventricular cavity  LEFT ATRIUM: The atrium was mildly to moderately dilated  RIGHT ATRIUM: The atrium was mildly to moderately dilated  MITRAL VALVE: There was mild to moderate annular calcification  There was normal leaflet separation  DOPPLER: The transmitral velocity was within the normal range  There was no evidence for stenosis  There was mild regurgitation  AORTIC VALVE: A bioprosthesis was present  It exhibited normal function  TRICUSPID VALVE: The valve structure was normal  There was normal leaflet separation  DOPPLER: The transtricuspid velocity was within the normal range  There was no evidence for stenosis  There was mild regurgitation  Pulmonary artery  systolic pressure was mildly increased  Estimated peak PA pressure was 40 mmHg  PULMONIC VALVE: Leaflets exhibited normal thickness, no calcification, and normal cuspal separation   DOPPLER: The transpulmonic velocity was within the normal range  There was mild regurgitation  PERICARDIUM: There was no pericardial effusion  AORTA: The root exhibited normal size  SYSTEMIC VEINS: IVC: The inferior vena cava was not well visualized  MEASUREMENT TABLES    2D MEASUREMENTS  LVOT   (Reference normals)  Diam   23 mm   (--)    DOPPLER MEASUREMENTS  LVOT   (Reference normals)  Peak annie   101 cm/s   (--)  Mean annie   70 cm/s   (--)  VTI   23 cm   (--)  Peak gradient   4 mmHg   (--)  Mean gradient   2 2 mmHg   (--)  Stroke vol   95 56 ml   (--)  Aortic valve   (Reference normals)  Peak annie   197 cm/s   (--)  Mean annie   152 cm/s   (--)  VTI   44 cm   (--)  Peak gradient   16 mmHg   (--)  Mean gradient   9 mmHg   (--)  Obstr index, VTI   0 52    (--)  Valve area, VTI   2 16 cmï¾²   (--)  Area index, VTI   0 79 cmï¾²/mï¾²   (--)  Obstr index, Vmax   0 51    (--)  Valve area, Vmax   2 12 cmï¾²   (--)  Area index, Vmax   0 77 cmï¾²/mï¾²   (--)  Obstr index, Vmean   0 46    (--)  Valve area, Vmean   1 91 cmï¾²   (--)  Area index, Vmean   0 7 cmï¾²/mï¾²   (--)    SYSTEM MEASUREMENT TABLES    2D  %FS: 22 82 %  Ao Diam: 2 49 cm  EDV(Teich): 57 38 ml  EF(Teich): 46 66 %  ESV(Teich): 30 61 ml  IVSd: 1 96 cm  LA Area: 26 79 cm2  LA Diam: 4 97 cm  LVEDV MOD A4C: 112 1 ml  LVEF MOD A4C: 50 26 %  LVESV MOD A4C: 55 76 ml  LVIDd: 3 68 cm  LVIDs: 2 84 cm  LVLd A4C: 8 11 cm  LVLs A4C: 6 99 cm  LVOT Diam: 2 25 cm  LVPWd: 1 98 cm  RA Area: 24 17 cm2  RVIDd: 4 17 cm  SV MOD A4C: 56 34 ml  SV(Teich): 26 78 ml    CW  AV Env  Ti: 296 86 ms  AV VTI: 39 2 cm  AV Vmax: 2 1 m/s  AV Vmean: 1 32 m/s  AV maxP 68 mmHg  AV meanP 71 mmHg  TR Vmax: 2 85 m/s  TR maxP 45 mmHg    MM  TAPSE: 1 69 cm    PW  KISHA (VTI): 2 34 cm2  KISHA Vmax: 1 91 cm2  AVAI (VTI): 0 cm2/m2  AVAI Vmax: 0 cm2/m2  E' Sept: 0 06 m/s  E/E' Sept: 19 55  LVOT Env  Ti: 327 31 ms  LVOT VTI: 22 97 cm  LVOT Vmax: 1 01 m/s  LVOT Vmean: 0 7 m/s  LVOT maxP 04 mmHg  LVOT meanP 24 mmHg  LVSI Dopp: 33 45 ml/m2  LVSV Dopp: 91 66 ml  MV A Juan: 1 43 m/s  MV Dec [place]: 5 21 m/s2  MV DecT: 213 91 ms  MV E Juan: 1 11 m/s  MV E/A Ratio: 0 78  MV PHT: 62 03 ms  MVA By PHT: 3 55 cm2    [facility] Accredited [facility]    Prepared and electronically signed by    Heaven Maravilla MD  Signed [id number]-[id number] 10:03:59    No results found for this or any previous visit  No results found for this or any previous visit  No results found for this or any previous visit

## 2023-04-27 NOTE — PATIENT INSTRUCTIONS
Recommendations:  1  Start metolazone 2 5mg 2x/week on Saturday/Thursday  Take 30 minutes before AM torsemide  2  Continue remainder of medications  3  Check basic metabolic profile in 2 weeks  4  Follow up in one month

## 2023-04-28 ENCOUNTER — HOME CARE VISIT (OUTPATIENT)
Dept: HOME HEALTH SERVICES | Facility: HOME HEALTHCARE | Age: 88
End: 2023-04-28

## 2023-04-28 VITALS
HEART RATE: 81 BPM | SYSTOLIC BLOOD PRESSURE: 128 MMHG | RESPIRATION RATE: 17 BRPM | OXYGEN SATURATION: 99 % | TEMPERATURE: 97.4 F | DIASTOLIC BLOOD PRESSURE: 66 MMHG

## 2023-05-02 ENCOUNTER — HOME CARE VISIT (OUTPATIENT)
Dept: HOME HEALTH SERVICES | Facility: HOME HEALTHCARE | Age: 88
End: 2023-05-02

## 2023-05-02 VITALS
HEART RATE: 67 BPM | OXYGEN SATURATION: 97 % | TEMPERATURE: 97.6 F | DIASTOLIC BLOOD PRESSURE: 56 MMHG | SYSTOLIC BLOOD PRESSURE: 122 MMHG | RESPIRATION RATE: 18 BRPM

## 2023-05-04 ENCOUNTER — HOME CARE VISIT (OUTPATIENT)
Dept: HOME HEALTH SERVICES | Facility: HOME HEALTHCARE | Age: 88
End: 2023-05-04

## 2023-05-04 VITALS
SYSTOLIC BLOOD PRESSURE: 124 MMHG | HEART RATE: 64 BPM | TEMPERATURE: 97.4 F | DIASTOLIC BLOOD PRESSURE: 70 MMHG | RESPIRATION RATE: 18 BRPM | OXYGEN SATURATION: 98 %

## 2023-05-06 ENCOUNTER — HOME CARE VISIT (OUTPATIENT)
Dept: HOME HEALTH SERVICES | Facility: HOME HEALTHCARE | Age: 88
End: 2023-05-06

## 2023-05-06 VITALS
OXYGEN SATURATION: 96 % | HEART RATE: 89 BPM | SYSTOLIC BLOOD PRESSURE: 128 MMHG | DIASTOLIC BLOOD PRESSURE: 52 MMHG | TEMPERATURE: 97.8 F | RESPIRATION RATE: 18 BRPM

## 2023-05-08 ENCOUNTER — HOME CARE VISIT (OUTPATIENT)
Dept: HOME HEALTH SERVICES | Facility: HOME HEALTHCARE | Age: 88
End: 2023-05-08

## 2023-05-09 ENCOUNTER — OFFICE VISIT (OUTPATIENT)
Dept: WOUND CARE | Facility: HOSPITAL | Age: 88
End: 2023-05-09

## 2023-05-09 ENCOUNTER — HOME CARE VISIT (OUTPATIENT)
Dept: HOME HEALTH SERVICES | Facility: HOME HEALTHCARE | Age: 88
End: 2023-05-09

## 2023-05-09 VITALS
DIASTOLIC BLOOD PRESSURE: 53 MMHG | RESPIRATION RATE: 20 BRPM | SYSTOLIC BLOOD PRESSURE: 109 MMHG | HEART RATE: 75 BPM | TEMPERATURE: 97.6 F

## 2023-05-09 DIAGNOSIS — L97.919 CHRONIC VENOUS HYPERTENSION (IDIOPATHIC) WITH ULCER OF RIGHT LOWER EXTREMITY (HCC): Primary | ICD-10-CM

## 2023-05-09 DIAGNOSIS — L97.312 NON-PRESSURE CHRONIC ULCER OF RIGHT ANKLE WITH FAT LAYER EXPOSED (HCC): ICD-10-CM

## 2023-05-09 DIAGNOSIS — I87.311 CHRONIC VENOUS HYPERTENSION (IDIOPATHIC) WITH ULCER OF RIGHT LOWER EXTREMITY (HCC): Primary | ICD-10-CM

## 2023-05-09 PROCEDURE — 10330064

## 2023-05-09 RX ORDER — LIDOCAINE 40 MG/G
CREAM TOPICAL ONCE
Status: COMPLETED | OUTPATIENT
Start: 2023-05-09 | End: 2023-05-09

## 2023-05-09 RX ORDER — SODIUM HYPOCHLORITE 2.5 MG/ML
1 SOLUTION TOPICAL DAILY
Qty: 473 ML | Refills: 0 | Status: SHIPPED | OUTPATIENT
Start: 2023-05-09

## 2023-05-09 RX ADMIN — LIDOCAINE 1 APPLICATION.: 40 CREAM TOPICAL at 14:40

## 2023-05-09 NOTE — PATIENT INSTRUCTIONS
Orders Placed This Encounter   Procedures    Wound compression and edema control     Right Legz; Ace Bandage: Apply from base of toes to behind the knee  Apply in AM, may remove for sleep  Avoid prolonged standing in one place  Elevate leg(s) above the level of the heart when sitting or as much as possible  Continue use of compression pumps at home     Standing Status:   Future     Standing Expiration Date:   5/9/2024    Wound home care     Continue [facility] for wound care     Standing Status:   Future     Standing Expiration Date:   5/9/2024    Wound miscellaneous orders     Obtain dakins solution from pharmacy as prescribed today  Standing Status:   Future     Standing Expiration Date:   5/9/2024    Wound cleansing and dressings     Wound location: Right ankle and Right lower leg medial  Change dressing 3 times a week and as needed  You may remove the dressing and shower  Do not leave wound open to air, apply new dressing immediately  Soak wound with 1/4 strength dakins moistened gauze for 10 mins  Remove gauze and rinse well with saline  Apply Zinc cream to intact skin  Apply Alginate Ag to the open wounds    Cover with [employer] absorbent pad, may use equivalent if unable to obtain mextra  Secure with juanjo and tape  Above treatment performed at wound care center     Standing Status:   Future     Standing Expiration Date:   5/9/2024

## 2023-05-09 NOTE — PROGRESS NOTES
"Patient ID: Chase Olmos is a 81 y o  male Date of Birth 1935     Chief Complaint  Chief Complaint   Patient presents with   • Follow Up Wound Care Visit     RLE       Allergies  Tramadol    Assessment:    No problem-specific Assessment & Plan notes found for this encounter  Diagnoses and all orders for this visit:    Chronic venous hypertension (idiopathic) with ulcer of right lower extremity (HCC)  -     lidocaine (LMX) 4 % cream  -     Cancel: Wound cleansing and dressings; Future  -     Wound compression and edema control; Future  -     Wound home care; Future  -     Wound miscellaneous orders; Future  -     Wound cleansing and dressings; Future  -     sodium hypochlorite (DAKIN'S HALF-STRENGTH) external solution; Apply 1 application  topically daily At each dressing change  -     Debridement  -     Debridement    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)  -     lidocaine (LMX) 4 % cream  -     Cancel: Wound cleansing and dressings; Future  -     Wound compression and edema control; Future  -     Wound home care; Future  -     Wound miscellaneous orders; Future  -     Wound cleansing and dressings; Future              Debridement   Wound 01/13/23 Ankle Right;Medial    Universal Protocol:  Consent: Verbal consent obtained  Consent given by: patient  Time out: Immediately prior to procedure a \"time out\" was called to verify the correct patient, procedure, equipment, support staff and site/side marked as required    Timeout called at: 5/9/2023 2:35 PM   Patient understanding: patient states understanding of the procedure being performed  Patient identity confirmed: verbally with patient      Performed by: physician  Debridement type: surgical  Level of debridement: subcutaneous tissue  Pain control: lidocaine 4%  Post-debridement measurements  Length (cm): 5  Width (cm): 2  Depth (cm): 0 2  Percent debrided: 100%  Surface Area (cm^2): 10  Area debrided (cm^2): 10  Volume " "(cm^3): 2  Tissue and other material debrided: subcutaneous tissue  Devitalized tissue debrided: exudate and slough  Instrument(s) utilized: curette  Bleeding: small  Hemostasis obtained with: pressure  Response to treatment: procedure was tolerated well    Debridement   Wound 03/03/23 Leg Right;Medial    Universal Protocol:  Consent: Verbal consent obtained  Consent given by: patient  Time out: Immediately prior to procedure a \"time out\" was called to verify the correct patient, procedure, equipment, support staff and site/side marked as required  Timeout called at: 5/9/2023 2:35 PM   Patient understanding: patient states understanding of the procedure being performed  Patient identity confirmed: verbally with patient      Performed by: physician  Debridement type: surgical  Level of debridement: subcutaneous tissue  Pain control: lidocaine 4%  Post-debridement measurements  Length (cm): 6 9  Width (cm): 5 5  Depth (cm): 0 2  Percent debrided: 100%  Surface Area (cm^2): 37 95  Area debrided (cm^2): 37 95  Volume (cm^3): 7 59  Tissue and other material debrided: subcutaneous tissue  Devitalized tissue debrided: exudate and slough  Instrument(s) utilized: curette  Bleeding: small  Hemostasis obtained with: pressure  Response to treatment: procedure was tolerated well          Plan:  Wounds are worse  Debrided as above  Continue wound management silver alginate but add a 10-minute Dakin soak at each dressing change  See wound orders below  Did consent to the pharmacy today  Change compression to Ace wrap  Keep legs elevated whenever seated and avoid prolonged standing  Call cardiologist to discuss symptomatic decline  Patient agreeable to calling tomorrow  Follow-up in 2 weeks or call sooner with questions or concerns    Wound 01/13/23 Ankle Right;Medial (Active)   Wound Image Images linked 05/09/23 1502   Wound Description Granulation tissue; Yellow;Slough;Pink;Epithelialization 05/09/23 5488   Deedee-wound " Assessment [place] 05/09/23 1438   Wound Length (cm) 5 cm 05/09/23 1438   Wound Width (cm) 2 cm 05/09/23 1438   Wound Depth (cm) 0 1 cm 05/09/23 1438   Wound Surface Area (cm^2) 10 cm^2 05/09/23 1438   Wound Volume (cm^3) 1 cm^3 05/09/23 1438   Calculated Wound Volume (cm^3) 1 cm^3 05/09/23 1438   Change in Wound Size % 54 13 05/09/23 1438   Drainage Amount Copious 05/09/23 1438   Drainage Description Serosanguineous; Yellow;Green 05/09/23 1438   Non-staged Wound Description Full thickness 05/09/23 1438   Dressing Status Leaking (Comment) 05/09/23 1438       Wound 03/03/23 Leg Right;Medial (Active)   Wound Image Images linked 05/09/23 1502   Wound Description Epithelialization;Granulation tissue;Pink;Slough; Yellow 05/09/23 1439   Deedee-wound Assessment Fragile 05/09/23 1439   Wound Length (cm) 6 9 cm 05/09/23 1439   Wound Width (cm) 5 5 cm 05/09/23 1439   Wound Depth (cm) 0 1 cm 05/09/23 1439   Wound Surface Area (cm^2) 37 95 cm^2 05/09/23 1439   Wound Volume (cm^3) 3 795 cm^3 05/09/23 1439   Calculated Wound Volume (cm^3) 3 8 cm^3 05/09/23 1439   Change in Wound Size % -850 05/09/23 1439   Drainage Amount Copious 05/09/23 1439   Drainage Description Serosanguineous; Yellow;Green 05/09/23 1439   Non-staged Wound Description Full thickness 05/09/23 1439   Dressing Status Intact 05/09/23 1439       Wound 01/13/23 Ankle Right;Medial (Active)   Date First Assessed/Time First Assessed: 01/13/23 1308   Location: Ankle  Wound Location Orientation: Right;Medial       Wound 03/03/23 Leg Right;Medial (Active)   Date First Assessed/Time First Assessed: 03/03/23 1329   Location: Leg  Wound Location Orientation: Right;Medial       [REMOVED] Incision 08/17/18 Leg Right (Removed)   Resolved Date: (c) 09/05/22  Date First Assessed/Time First Assessed: 08/17/18 1127   Location: Leg  Wound Location Orientation: Right       [REMOVED] Wound 09/05/22 Surgical Abdomen Right (Removed)   Resolved Date: 10/25/22  Date First Assessed/Time First Assessed: 09/05/22 1505   Pre-Existing Wound: Yes  Primary Wound Type: Surgical  Location: Abdomen  Wound Location Orientation: Right  Wound Description (Comments): IR Drain tube       [REMOVED] Wound 10/31/22 Surgical Abdomen Right;Quadrant; Upper (Removed)   Resolved Date: 11/30/22  Date First Assessed/Time First Assessed: 10/31/22 1422   Pre-Existing Wound: Yes  Primary Wound Type: Surgical  Location: Abdomen  Wound Location Orientation: Right;Quadrant; Upper  Wound Description (Comments): IR Gail tube p  [REMOVED] Wound 11/09/22 Venous Ulcer Ankle Right;Medial (Removed)   Resolved Date: 11/30/22  Date First Assessed/Time First Assessed: 11/09/22 2200   Pre-Existing Wound: No  Primary Wound Type: Venous Ulcer  Location: Ankle  Wound Location Orientation: Right;Medial  Dressing Status: Clean;Dry; Intact  Wound Outcome: He    [REMOVED] Wound 11/11/22 Pressure Injury Buttocks Right; Inner (Removed)   Resolved Date: 11/30/22  Date First Assessed/Time First Assessed: 11/11/22 1200   Pre-Existing Wound: No  Primary Wound Type: Pressure Injury  Location: Buttocks  Wound Location Orientation: Right; Inner  Wound Description (Comments): stage 2 pressure     [REMOVED] Wound 12/16/22 Other (comment) Leg Inner;Right (Removed)   Resolved Date: 03/03/23  Date First Assessed/Time First Assessed: 12/16/22 0909   Pre-Existing Wound: No  Primary Wound Type: Other (comment)  Location: Leg  Wound Location Orientation: Inner;Right  Dressing Status: Clean;Dry; Intact  Wound Outcome: Ot    [REMOVED] Wound 02/17/23 Venous Ulcer Leg Right; Lower;Mid;Anterior (Removed)   Resolved Date: 03/24/23  Date First Assessed/Time First Assessed: 02/17/23 1433   Primary Wound Type: Venous Ulcer  Location: Leg  Wound Location Orientation: Right; Lower;Mid;Anterior  Wound Outcome: Healed       Subjective:        F/u RLE venous ulcer  Increased drainage, noted to be green over the past few days per wife  No increased pain    Has been using silver alginate on the wound and tubigrip for compression  Patient reports feeling weak, cold, increased SOB, poor apetite  He was seen by PCP and Cardiologist last week and was experiencing these symptoms at that time  No significant worsening since then  Has f/u with Cardiologist next week but is supposed to call him in 2 days  The following portions of the patient's history were reviewed and updated as appropriate:   He  has a past medical history of Anemia, Arthritis, Atrial fibrillation ([facility] ), Basal cell carcinoma (03/22/2022), CHF (congestive heart failure) ([place] [address] ), Diabetes mellitus ([facility] ), DVT (deep vein thrombosis) in pregnancy (1966), DVT (deep venous thrombosis) ([facility] ) (1966), Dyslipidemia, Encephalopathy acute (11/4/2022), GERD (gastroesophageal reflux disease), Hyperlipidemia, Hypertension, Hypomagnesemia (10/19/2022), Irregular heart beat, Morbid obesity due to excess calories ([place] [address] ), Pulmonary embolism ([place] [address] ), Sepsis ([place] [address] ), Squamous cell skin cancer (07/30/2020), and Visual impairment    He   Patient Active Problem List    Diagnosis Date Noted   • 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   • Pneumonia of both lungs due to infectious organism 04/05/2023   • COVID-19 04/05/2023   • Pleural effusion on right 04/05/2023   • Calculus of gallbladder 03/08/2023   • Cholecystostomy care ([place] [address] ) 11/21/2022   • Gout 11/21/2022   • Acute on chronic cholecystitis 11/04/2022   • Acute cholecystitis 08/30/2022   • Benign prostatic hyperplasia with lower urinary tract symptoms 08/09/2022   • OBINNA (obstructive sleep apnea)    • CKD (chronic kidney disease) stage 3, GFR 30-59 ml/min ([facility] ) 05/07/2022   • Acute kidney injury ([facility] ) 11/18/2021   • Osteoarthritis, knee 11/15/2021   • Status post right knee replacement 11/15/2021   • Mixed hyperlipidemia 03/29/2021   • Swelling of limb 02/04/2021   • Chronic deep vein thrombosis (DVT) ([facility] ) 09/10/2020   • Secondary lymphedema 09/24/2018   • Venous ulcer of ankle, right ([place] [address] ) 07/11/2018   • S/P AVR 03/22/2018   • Thrombophlebitis 03/22/2018   • Presence of implantable cardioverter-defibrillator (ICD) 03/22/2018   • Chronic bilateral low back pain without sciatica 04/24/2017   • BPH (benign prostatic hyperplasia) 04/20/2017   • Obesity, unspecified obesity severity, unspecified obesity type 04/04/2017   • Hydrocele 04/04/2017   • Chronic anticoagulation 04/04/2017   • Anemia 04/04/2017   • Renal cyst 04/04/2017   • 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 08/11/2015   • Ventricular tachycardia 04/15/2014   • Paroxysmal atrial fibrillation ([place] [address] ) 03/19/2014   • Edema 03/19/2014   • Chronic diastolic CHF (congestive heart failure) ([place] [address] ) 03/19/2014   • Endocarditis 03/19/2014   • Type 2 diabetes mellitus with diabetic neuropathy, without long-term current use of insulin ([place] [address] ) 08/19/2013   • Peripheral venous insufficiency 08/19/2013   • Cardiomegaly 08/19/2013   • PVD (peripheral vascular disease) ([place] [address] ) 04/19/2013   • Dyslipidemia 04/19/2013     He  reports that he has never smoked  He has never used smokeless tobacco  He reports that he does not currently use alcohol  He reports that he does not use drugs  Current Outpatient Medications   Medication Sig Dispense Refill   • sodium hypochlorite (DAKIN'S HALF-STRENGTH) external solution Apply 1 application  topically daily At each dressing change 473 mL 0   • Acetaminophen (TYLENOL EXTRA STRENGTH PO) Take 500 mg by mouth if needed (for pain as needed)  Indications: pain as needed     • albuterol (PROVENTIL HFA,VENTOLIN HFA) 90 mcg/act inhaler Inhale 2 puffs every 6 (six) hours as needed for wheezing or shortness of breath     • allopurinol (ZYLOPRIM) 100 mg tablet Take 1 tablet (100 mg total) by mouth daily Do not start before November 15, 2022   (Patient not taking: Reported on 4/8/2023)     • atorvastatin (LIPITOR) 40 mg tablet Take 40 mg by mouth daily after dinner Atorvastatin Calcium 40 MG Oral Tablet Take 1 tablet daily  Refills: 0  Active      • B Complex-C (SUPER B COMPLEX PO) Take 1 capsule by mouth daily      • benzonatate (TESSALON PERLES) 100 mg capsule Take 1 capsule (100 mg total) by mouth 3 (three) times a day as needed for cough 20 capsule 0   • candesartan-hydrochlorothiazide (ATACAND HCT) 16-12 5 MG per tablet Take 1 tablet by mouth daily  Indications: High Blood Pressure Disorder     • Cholecalciferol 25 MCG (1000 UT) capsule Take 3,000 Units by mouth daily  Indications: Vitamin D Deficiency     • Diclofenac Sodium (VOLTAREN) 1 % Apply 2 g topically 4 (four) times a day     • fluticasone (FLONASE) 50 mcg/act nasal spray 2 sprays into each nostril daily as needed Shake liquid and spray     • gabapentin (NEURONTIN) 100 mg capsule Take 100 mg by mouth daily     • Iron Combinations (NIFEREX) TABS Take 1 tablet by mouth in the morning  5   • lubiprostone (AMITIZA) 24 mcg capsule Take 24 mcg by mouth 2 (two) times a day with meals  Indications: Chronic Constipation of Unknown Cause     • metFORMIN (GLUCOPHAGE) 1000 MG tablet Take 1,000 mg by mouth daily with dinner  Indications: Type 2 Diabetes     • metolazone (ZAROXOLYN) 2 5 mg tablet Take 1 tablet (2 5 mg total) by mouth 2 (two) times a week Take on Saturday AM and Thursday AM 30 minutes prior to AM torsemide  30 tablet 5   • mirtazapine (REMERON) 7 5 MG tablet Take 15 mg by mouth daily at bedtime ordered by Dr Jackie Segal     • Multiple Vitamin (MULTIVITAMINS PO) Take 1 tablet by mouth daily     • omeprazole (PriLOSEC) 20 mg delayed release capsule Omeprazole 20 MG Oral Tablet Delayed Release Take 1 tablet daily  Refills: 0  Active     • polyethylene glycol (GLYCOLAX) 17 GM/SCOOP powder Take 17 g by mouth 2 (two) times a day     • Potassium Chloride ER 20 MEQ TBCR Take 20 mEq by mouth in the morning   Indications: Low Amount of Potassium in the Blood     • senna-docusate sodium (SENOKOT S) 8 6-50 mg per tablet Take 1 tablet by mouth daily at bedtime 30 tablet 0 • sodium chloride, PF, 0 9 % 10 mL by Intracatheter route daily Intracatheter flushing daily  May substitute prefilled syringe with normal saline 10 mL vials, 10 mL syringes, and 18 g blunt needles (Patient taking differently: 10 mL by Intracatheter route every 12 (twelve) hours Intracatheter flushing daily  May substitute prefilled syringe with normal saline 10 mL vials, 10 mL syringes, and 18 g blunt needles) 300 mL 2   • sodium chloride, PF, 0 9 % 10 mL by Intracatheter route daily for 120 doses Intracatheter flushing daily  May substitute prefilled syringe with normal saline 10 mL vials, 10 mL syringes, and 18 g blunt needles 300 mL 3   • sotalol (BETAPACE) 80 mg tablet Take 1 tablet (80 mg total) by mouth daily  0   • torsemide (DEMADEX) 20 mg tablet Take 20 mg by mouth daily and 10 mg in PM     • warfarin (COUMADIN) 2 mg tablet Take 2 tablets (4 mg total) by mouth daily Acknowledge (Patient taking differently: Take 4 mg by mouth daily Pt taking 6mg Tue/Thur/Sat and 4 mg M/W/F/Sun) 14 tablet 0     No current facility-administered medications for this visit  Facility-Administered Medications Ordered in Other Visits   Medication Dose Route Frequency Provider Last Rate Last Admin   • cefTRIAXone (ROCEPHIN) 2,000 mg in dextrose 5 % 50 mL IVPB  2,000 mg Intravenous Once Farhat Patel MD       • sodium chloride 0 9 % infusion  75 mL/hr Intravenous Continuous Farhat Patel MD 75 mL/hr at 03/08/23 1000 75 mL/hr at 03/08/23 1000     He is allergic to tramadol       Review of Systems   Constitutional: Negative for chills and fever  HENT: Negative for congestion and sneezing  Respiratory: Negative for cough  Cardiovascular: Positive for leg swelling  Musculoskeletal: Positive for gait problem  Skin: Positive for wound  Psychiatric/Behavioral: Negative for agitation           Objective:       Wound 01/13/23 Ankle Right;Medial (Active)   Wound Image Images linked 05/09/23 1502   Wound Description Granulation tissue; Yellow;Slough;Pink;Epithelialization 05/09/23 1438   Deedee-wound Assessment Pink 05/09/23 1438   Wound Length (cm) 5 cm 05/09/23 1438   Wound Width (cm) 2 cm 05/09/23 1438   Wound Depth (cm) 0 1 cm 05/09/23 1438   Wound Surface Area (cm^2) 10 cm^2 05/09/23 1438   Wound Volume (cm^3) 1 cm^3 05/09/23 1438   Calculated Wound Volume (cm^3) 1 cm^3 05/09/23 1438   Change in Wound Size % 54 13 05/09/23 1438   Drainage Amount Copious 05/09/23 1438   Drainage Description Serosanguineous; Yellow;Green 05/09/23 1438   Non-staged Wound Description Full thickness 05/09/23 1438   Dressing Status Leaking (Comment) 05/09/23 1438       Wound 03/03/23 Leg Right;Medial (Active)   Wound Image Images linked 05/09/23 1502   Wound Description Epithelialization;Granulation tissue;Pink;Slough; Yellow 05/09/23 1439   Deedee-wound Assessment Fragile 05/09/23 1439   Wound Length (cm) 6 9 cm 05/09/23 1439   Wound Width (cm) 5 5 cm 05/09/23 1439   Wound Depth (cm) 0 1 cm 05/09/23 1439   Wound Surface Area (cm^2) 37 95 cm^2 05/09/23 1439   Wound Volume (cm^3) 3 795 cm^3 05/09/23 1439   Calculated Wound Volume (cm^3) 3 8 cm^3 05/09/23 1439   Change in Wound Size % -850 05/09/23 1439   Drainage Amount Copious 05/09/23 1439   Drainage Description Serosanguineous; Yellow;Green 05/09/23 1439   Non-staged Wound Description Full thickness 05/09/23 1439   Dressing Status Intact 05/09/23 1439       /53   Pulse 75   Temp 97 6 °F (36 4 °C)   Resp 20     Physical Exam        Wound 01/13/23 Ankle Right;Medial (Active)   Wound Image   05/09/23 1502   Wound Description Granulation tissue; Yellow;Slough;Pink;Epithelialization 05/09/23 1438   Deedee-wound Assessment Pink 05/09/23 1438   Wound Length (cm) 5 cm 05/09/23 1438   Wound Width (cm) 2 cm 05/09/23 1438   Wound Depth (cm) 0 1 cm 05/09/23 1438   Wound Surface Area (cm^2) 10 cm^2 05/09/23 1438   Wound Volume (cm^3) 1 cm^3 05/09/23 1438   Calculated Wound Volume (cm^3) 1 cm^3 05/09/23 1438 Change in Wound Size % 54 13 05/09/23 1438   Drainage Amount Copious 05/09/23 1438   Drainage Description Serosanguineous; Yellow;Green 05/09/23 1438   Non-staged Wound Description Full thickness 05/09/23 1438   Patient Tolerance Tolerated well 04/25/23 1330   Dressing Status Leaking (Comment) 05/09/23 1438       Wound 03/03/23 Leg Right;Medial (Active)   Wound Image   05/09/23 1502   Wound Description Epithelialization;Granulation tissue;Pink;Slough; Yellow 05/09/23 1439   Deedee-wound Assessment Fragile 05/09/23 1439   Wound Length (cm) 6 9 cm 05/09/23 1439   Wound Width (cm) 5 5 cm 05/09/23 1439   Wound Depth (cm) 0 1 cm 05/09/23 1439   Wound Surface Area (cm^2) 37 95 cm^2 05/09/23 1439   Wound Volume (cm^3) 3 795 cm^3 05/09/23 1439   Calculated Wound Volume (cm^3) 3 8 cm^3 05/09/23 1439   Change in Wound Size % -850 05/09/23 1439   Drainage Amount Copious 05/09/23 1439   Drainage Description Serosanguineous; Yellow;Green 05/09/23 1439   Non-staged Wound Description Full thickness 05/09/23 1439   Patient Tolerance Tolerated well 04/25/23 1331   Dressing Status Intact 05/09/23 1439                       Wound Instructions:  Orders Placed This Encounter   Procedures   • Wound compression and edema control     Right Legz; Ace Bandage: Apply from base of toes to behind the knee  Apply in AM, may remove for sleep  Avoid prolonged standing in one place  Elevate leg(s) above the level of the heart when sitting or as much as possible  Continue use of compression pumps at home     Standing Status:   Future     Standing Expiration Date:   5/9/2024   • Wound home care     Continue [facility] for wound care     Standing Status:   Future     Standing Expiration Date:   5/9/2024   • Wound miscellaneous orders     Obtain dakins solution from pharmacy as prescribed today       Standing Status:   Future     Standing Expiration Date:   5/9/2024   • Wound cleansing and dressings     Wound location: Right ankle and Right lower leg medial  Change dressing 3 times a week and as needed  You may remove the dressing and shower  Do not leave wound open to air, apply new dressing immediately  Soak wound with 1/4 strength dakins moistened gauze for 10 mins  Remove gauze and rinse well with saline  Apply Zinc cream to intact skin  Apply Alginate Ag to the open wounds  Cover with [employer] absorbent pad, may use equivalent if unable to obtain mextra  Secure with juanjo and tape  Above treatment performed at wound care center     Standing Status:   Future     Standing Expiration Date:   5/9/2024   • Debridement     This order was created via procedure documentation   • Debridement     This order was created via procedure documentation        Diagnosis ICD-10-CM Associated Orders   1  Chronic venous hypertension (idiopathic) with ulcer of right lower extremity (HCC)  I87 [id number] lidocaine (LMX) 4 % cream    L97 [id number] Wound compression and edema control     Wound home care     Wound miscellaneous orders     Wound cleansing and dressings     sodium hypochlorite (DAKIN'S HALF-STRENGTH) external solution     Debridement     Debridement      2   Non-pressure chronic ulcer of right ankle with fat layer exposed ([address] [address] )  L97 312 lidocaine (LMX) 4 % cream     Wound compression and edema control     Wound home care     Wound miscellaneous orders     Wound cleansing and dressings

## 2023-05-11 ENCOUNTER — HOME CARE VISIT (OUTPATIENT)
Dept: HOME HEALTH SERVICES | Facility: HOME HEALTHCARE | Age: 88
End: 2023-05-11

## 2023-05-11 VITALS
TEMPERATURE: 98.9 F | RESPIRATION RATE: 20 BRPM | SYSTOLIC BLOOD PRESSURE: 108 MMHG | HEART RATE: 76 BPM | OXYGEN SATURATION: 96 % | DIASTOLIC BLOOD PRESSURE: 58 MMHG

## 2023-05-12 ENCOUNTER — HOME CARE VISIT (OUTPATIENT)
Dept: HOME HEALTH SERVICES | Facility: HOME HEALTHCARE | Age: 88
End: 2023-05-12

## 2023-05-13 ENCOUNTER — HOME CARE VISIT (OUTPATIENT)
Dept: HOME HEALTH SERVICES | Facility: HOME HEALTHCARE | Age: 88
End: 2023-05-13

## 2023-05-13 VITALS
DIASTOLIC BLOOD PRESSURE: 60 MMHG | SYSTOLIC BLOOD PRESSURE: 120 MMHG | RESPIRATION RATE: 16 BRPM | OXYGEN SATURATION: 95 % | HEART RATE: 62 BPM | TEMPERATURE: 99 F

## 2023-05-16 ENCOUNTER — HOME CARE VISIT (OUTPATIENT)
Dept: HOME HEALTH SERVICES | Facility: HOME HEALTHCARE | Age: 88
End: 2023-05-16

## 2023-05-16 VITALS
RESPIRATION RATE: 17 BRPM | DIASTOLIC BLOOD PRESSURE: 70 MMHG | OXYGEN SATURATION: 97 % | SYSTOLIC BLOOD PRESSURE: 116 MMHG | TEMPERATURE: 97.9 F | HEART RATE: 79 BPM

## 2023-05-18 ENCOUNTER — HOME CARE VISIT (OUTPATIENT)
Dept: HOME HEALTH SERVICES | Facility: HOME HEALTHCARE | Age: 88
End: 2023-05-18

## 2023-05-18 VITALS
RESPIRATION RATE: 19 BRPM | SYSTOLIC BLOOD PRESSURE: 90 MMHG | HEART RATE: 65 BPM | TEMPERATURE: 97.4 F | OXYGEN SATURATION: 98 % | DIASTOLIC BLOOD PRESSURE: 50 MMHG

## 2023-05-19 ENCOUNTER — HOSPITAL ENCOUNTER (EMERGENCY)
Facility: HOSPITAL | Age: 88
Discharge: HOME | End: 2023-05-19
Attending: EMERGENCY MEDICINE

## 2023-05-19 VITALS
TEMPERATURE: 97.9 F | DIASTOLIC BLOOD PRESSURE: 60 MMHG | HEART RATE: 74 BPM | RESPIRATION RATE: 18 BRPM | OXYGEN SATURATION: 98 % | BODY MASS INDEX: 41.75 KG/M2 | SYSTOLIC BLOOD PRESSURE: 119 MMHG | WEIGHT: 315 LBS | HEIGHT: 73 IN

## 2023-05-19 DIAGNOSIS — E86.9 VOLUME DEPLETION: ICD-10-CM

## 2023-05-19 DIAGNOSIS — D68.9 COAGULOPATHY (HCC): Primary | ICD-10-CM

## 2023-05-19 LAB
ALBUMIN SERPL BCP-MCNC: 3.3 G/DL (ref 3.5–5)
ALP SERPL-CCNC: 103 U/L (ref 34–104)
ALT SERPL W P-5'-P-CCNC: 13 U/L (ref 7–52)
ANION GAP SERPL CALCULATED.3IONS-SCNC: 8 MMOL/L (ref 4–13)
AST SERPL W P-5'-P-CCNC: 22 U/L (ref 13–39)
BASOPHILS # BLD AUTO: 0.05 THOUSANDS/ÂΜL (ref 0–0.1)
BASOPHILS NFR BLD AUTO: 1 % (ref 0–1)
BILIRUB SERPL-MCNC: 1.26 MG/DL (ref 0.2–1)
BUN SERPL-MCNC: 45 MG/DL (ref 5–25)
CALCIUM ALBUM COR SERPL-MCNC: 9.3 MG/DL (ref 8.3–10.1)
CALCIUM SERPL-MCNC: 8.7 MG/DL (ref 8.4–10.2)
CHLORIDE SERPL-SCNC: 105 MMOL/L (ref 96–108)
CO2 SERPL-SCNC: 25 MMOL/L (ref 21–32)
CREAT SERPL-MCNC: 2.16 MG/DL (ref 0.6–1.3)
EOSINOPHIL # BLD AUTO: 0.21 THOUSAND/ÂΜL (ref 0–0.61)
EOSINOPHIL NFR BLD AUTO: 4 % (ref 0–6)
ERYTHROCYTE [DISTWIDTH] IN BLOOD BY AUTOMATED COUNT: 19.2 % (ref 11.6–15.1)
GFR SERPL CREATININE-BSD FRML MDRD: 26 ML/MIN/1.73SQ M
GLUCOSE SERPL-MCNC: 107 MG/DL (ref 65–140)
HCT VFR BLD AUTO: 25.8 % (ref 36.5–49.3)
HGB BLD-MCNC: 7.8 G/DL (ref 12–17)
IMM GRANULOCYTES # BLD AUTO: 0.02 THOUSAND/UL (ref 0–0.2)
IMM GRANULOCYTES NFR BLD AUTO: 0 % (ref 0–2)
INR PPP: 6.64 (ref 0.84–1.19)
LYMPHOCYTES # BLD AUTO: 0.95 THOUSANDS/ÂΜL (ref 0.6–4.47)
LYMPHOCYTES NFR BLD AUTO: 19 % (ref 14–44)
MCH RBC QN AUTO: 28.8 PG (ref 26.8–34.3)
MCHC RBC AUTO-ENTMCNC: 30.2 G/DL (ref 31.4–37.4)
MCV RBC AUTO: 95 FL (ref 82–98)
MONOCYTES # BLD AUTO: 0.59 THOUSAND/ÂΜL (ref 0.17–1.22)
MONOCYTES NFR BLD AUTO: 12 % (ref 4–12)
NEUTROPHILS # BLD AUTO: 3.23 THOUSANDS/ÂΜL (ref 1.85–7.62)
NEUTS SEG NFR BLD AUTO: 64 % (ref 43–75)
NRBC BLD AUTO-RTO: 0 /100 WBCS
PLATELET # BLD AUTO: 239 THOUSANDS/UL (ref 149–390)
PMV BLD AUTO: 9 FL (ref 8.9–12.7)
POTASSIUM SERPL-SCNC: 5 MMOL/L (ref 3.5–5.3)
PROT SERPL-MCNC: 6.8 G/DL (ref 6.4–8.4)
PROTHROMBIN TIME: 58.1 SECONDS (ref 11.6–14.5)
RBC # BLD AUTO: 2.71 MILLION/UL (ref 3.88–5.62)
SODIUM SERPL-SCNC: 138 MMOL/L (ref 135–147)
WBC # BLD AUTO: 5.05 THOUSAND/UL (ref 4.31–10.16)

## 2023-05-19 RX ADMIN — SODIUM CHLORIDE 500 ML: 0.9 INJECTION, SOLUTION INTRAVENOUS at 15:20

## 2023-05-19 NOTE — DISCHARGE INSTRUCTIONS
Hold your dose of Coumadin Saturday morning and Sunday morning  Resume normal dose of Coumadin on Monday morning  Please call your physician on Monday to arrange close follow-up

## 2023-05-19 NOTE — ED PROVIDER NOTES
"History  Chief Complaint   Patient presents with   • Hypotension     Sent in by home care nurse for low BP of systolic's in the 27A, pt his asystematic      Patient is an 52-year-old male with a history of atrial fibrillation, DVT/PE, hypertension and hyperlipidemia who presents with hypotension  Patient states that he has a visiting nurse every other day  She comes to the house to care for his lower extremity wounds  His blood pressure yesterday was \"low\", and they recommended he come to the emergency department  However it was late in the day and he decided to check his blood pressure this morning instead of coming in this evening  Wife states that his systolic blood pressure was 102 mmHg  he states that he was asymptomatic  He specifically denies lightheadedness, dizziness, palpitations, chest pain, shortness of breath or other complaints  He denies any issues and states that his right lower extremity wound is improving with wound care  History provided by:  Patient  Medical Problem  Location:  Hypotension  Chronicity:  New  Ineffective treatments:  None tried  Associated symptoms: no abdominal pain, no chest pain, no cough, no diarrhea, no fever, no headaches, no nausea, no rash, no shortness of breath, no sore throat and no vomiting        Prior to Admission Medications   Prescriptions Last Dose Informant Patient Reported? Taking? Acetaminophen (TYLENOL EXTRA STRENGTH PO)   Yes No   Sig: Take 500 mg by mouth if needed (for pain as needed)  Indications: pain as needed   B Complex-C (SUPER B COMPLEX PO)   Yes No   Sig: Take 1 capsule by mouth daily    Cholecalciferol 25 MCG (1000 UT) capsule   Yes No   Sig: Take 3,000 Units by mouth daily   Indications: Vitamin D Deficiency   Diclofenac Sodium (VOLTAREN) 1 %   No No   Sig: Apply 2 g topically 4 (four) times a day   Iron Combinations (NIFEREX) TABS   Yes No   Sig: Take 1 tablet by mouth in the morning   Multiple Vitamin (MULTIVITAMINS PO)   Yes No " Sig: Take 1 tablet by mouth daily   Potassium Chloride ER 20 MEQ TBCR   Yes No   Sig: Take 20 mEq by mouth in the morning  Indications: Low Amount of Potassium in the Blood   albuterol (PROVENTIL HFA,VENTOLIN HFA) 90 mcg/act inhaler   Yes No   Sig: Inhale 2 puffs every 6 (six) hours as needed for wheezing or shortness of breath   allopurinol (ZYLOPRIM) 100 mg tablet   No No   Sig: Take 1 tablet (100 mg total) by mouth daily Do not start before November 15, 2022  Patient not taking: Reported on 2023   atorvastatin (LIPITOR) 40 mg tablet   Yes No   Sig: Take 40 mg by mouth daily after dinner Atorvastatin Calcium 40 MG Oral Tablet Take 1 tablet daily  Refills: 0  Active    benzonatate (TESSALON PERLES) 100 mg capsule   No No   Sig: Take 1 capsule (100 mg total) by mouth 3 (three) times a day as needed for cough   candesartan-hydrochlorothiazide (ATACAND HCT) 16-12 5 MG per tablet   Yes No   Sig: Take 1 tablet by mouth daily  Indications: High Blood Pressure Disorder   fluticasone (FLONASE) 50 mcg/act nasal spray   Yes No   Si sprays into each nostril daily as needed Shake liquid and spray   gabapentin (NEURONTIN) 100 mg capsule   Yes No   Sig: Take 100 mg by mouth daily   lubiprostone (AMITIZA) 24 mcg capsule   Yes No   Sig: Take 24 mcg by mouth 2 (two) times a day with meals  Indications: Chronic Constipation of Unknown Cause   metFORMIN (GLUCOPHAGE) 1000 MG tablet   Yes No   Sig: Take 1,000 mg by mouth daily with dinner  Indications: Type 2 Diabetes   metolazone (ZAROXOLYN) 2 5 mg tablet   No No   Sig: Take 1 tablet (2 5 mg total) by mouth 2 (two) times a week Take on Saturday AM and Thursday AM 30 minutes prior to AM torsemide     mirtazapine (REMERON) 7 5 MG tablet   Yes No   Sig: Take 15 mg by mouth daily at bedtime ordered by Dr Baltazar Messina   omeprazole (PriLOSEC) 20 mg delayed release capsule   Yes No   Sig: Omeprazole 20 MG Oral Tablet Delayed Release Take 1 tablet daily  Refills: 0  Active polyethylene glycol (GLYCOLAX) 17 GM/SCOOP powder   Yes No   Sig: Take 17 g by mouth 2 (two) times a day   senna-docusate sodium (SENOKOT S) 8 6-50 mg per tablet   No No   Sig: Take 1 tablet by mouth daily at bedtime   sodium chloride, PF, 0 9 %   No No   Sig: 10 mL by Intracatheter route daily Intracatheter flushing daily  May substitute prefilled syringe with normal saline 10 mL vials, 10 mL syringes, and 18 g blunt needles   Patient taking differently: 10 mL by Intracatheter route every 12 (twelve) hours Intracatheter flushing daily  May substitute prefilled syringe with normal saline 10 mL vials, 10 mL syringes, and 18 g blunt needles   sodium chloride, PF, 0 9 %   No No   Sig: 10 mL by Intracatheter route daily for 120 doses Intracatheter flushing daily  May substitute prefilled syringe with normal saline 10 mL vials, 10 mL syringes, and 18 g blunt needles   sodium hypochlorite (DAKIN'S HALF-STRENGTH) external solution   No No   Sig: Apply 1 application   topically daily At each dressing change   sotalol (BETAPACE) 80 mg tablet   No No   Sig: Take 1 tablet (80 mg total) by mouth daily   torsemide (DEMADEX) 20 mg tablet   Yes No   Sig: Take 20 mg by mouth daily and 10 mg in PM   warfarin (COUMADIN) 2 mg tablet   No No   Sig: Take 2 tablets (4 mg total) by mouth daily Acknowledge   Patient taking differently: Take 4 mg by mouth daily Pt taking 6mg Tue/Thur/Sat and 4 mg M/W/F/Sun      Facility-Administered Medications: None       Past Medical History:   Diagnosis Date   • Anemia    • Arthritis    • Atrial fibrillation (HCC)    • Basal cell carcinoma 03/22/2022    Tip of Nose   • CHF (congestive heart failure) (HCC)    • Diabetes mellitus ([facility] [address] )     Niddm   • DVT (deep vein thrombosis) in pregnancy 1966    not in pregnancy   • DVT (deep venous thrombosis) ([facility] [address] ) 1966   • Dyslipidemia    • Encephalopathy acute 11/4/2022   • GERD (gastroesophageal reflux disease)    • Hyperlipidemia    • Hypertension    • Hypomagnesemia 10/19/2022   • Irregular heart beat     Afib   • Morbid obesity due to excess calories ([facility] [address] )     Resolved 9/2/2014    • Pulmonary embolism (HCC)    • Sepsis ([facility] [address] )    • Squamous cell skin cancer 07/30/2020    Left posterior scalp   • Visual impairment        Past Surgical History:   Procedure Laterality Date   • AORTIC VALVE REPLACEMENT     • CARDIAC DEFIBRILLATOR PLACEMENT  04/2014   • CARDIAC SURGERY  02/2014    AVR   • COLONOSCOPY     • INSERT / REPLACE / REMOVE PACEMAKER     • IR ASPIRATION BAKERS CYST  3/8/2023   • IR CHOLECYSTOSTOMY TUBE CHECK/CHANGE/REPOSITION/REINSERTION/UPSIZE  10/13/2022   • IR CHOLECYSTOSTOMY TUBE CHECK/CHANGE/REPOSITION/REINSERTION/UPSIZE  10/19/2022   • IR CHOLECYSTOSTOMY TUBE CHECK/CHANGE/REPOSITION/REINSERTION/UPSIZE  10/27/2022   • IR CHOLECYSTOSTOMY TUBE CHECK/CHANGE/REPOSITION/REINSERTION/UPSIZE  11/7/2022   • IR CHOLECYSTOSTOMY TUBE CHECK/CHANGE/REPOSITION/REINSERTION/UPSIZE  11/10/2022   • IR CHOLECYSTOSTOMY TUBE CHECK/CHANGE/REPOSITION/REINSERTION/UPSIZE  12/1/2022   • IR CHOLECYSTOSTOMY TUBE CHECK/CHANGE/REPOSITION/REINSERTION/UPSIZE  1/6/2023   • IR CHOLECYSTOSTOMY TUBE CHECK/CHANGE/REPOSITION/REINSERTION/UPSIZE  1/24/2023   • IR CHOLECYSTOSTOMY TUBE CHECK/CHANGE/REPOSITION/REINSERTION/UPSIZE  3/15/2023   • IR CHOLECYSTOSTOMY TUBE PLACEMENT  9/1/2022   • IR DRAINAGE TUBE CHECK AND/OR REMOVAL  3/22/2023   • IR DRAINAGE TUBE CHECK AND/OR REMOVAL  4/10/2023   • IR DRAINAGE TUBE PLACEMENT  4/6/2023   • JOINT REPLACEMENT Left     LTKR   • MOHS SURGERY  07/30/2020    Left posterior scalp, Dr Malik Broussard   • MOHS SURGERY  04/18/2022    BCC Tip of Nose- Dr Malik Broussard   • Laury Reza DIVJ&STRIP LONG SAPH Malva Chema Right 8/17/2018    Procedure: LEG PERFORATED INJECTION SCLEROTHERAPY;  Surgeon: Valerie Wolf MD;  Location: [facility];  Service: Vascular   • REPLACEMENT TOTAL KNEE Right    • TOTAL KNEE ARTHROPLASTY Left    • VASCULAR SURGERY     • VENA CAVA FILTER PLACEMENT      Interruption inferior vena cava, Josue filter, placement   • WISDOM TOOTH EXTRACTION         Family History   Problem Relation Age of Onset   • Arthritis Mother    • Stroke Mother    • Arthritis Father    • Cancer Father    • Arthritis Daughter      I have reviewed and agree with the history as documented  E-Cigarette/Vaping   • E-Cigarette Use Never User      E-Cigarette/Vaping Substances   • Nicotine No    • THC No    • CBD No    • Other No    • Unknown No      Social History     Tobacco Use   • Smoking status: Never   • Smokeless tobacco: Never   Vaping Use   • Vaping Use: Never used   Substance Use Topics   • Alcohol use: Not Currently     Comment: no alcohol in 28 yrs   • Drug use: Never       Review of Systems   Constitutional: Negative for chills, diaphoresis and fever  HENT: Negative for nosebleeds, sore throat and trouble swallowing  Eyes: Negative for photophobia, pain and visual disturbance  Respiratory: Negative for cough, chest tightness and shortness of breath  Cardiovascular: Negative for chest pain, palpitations and leg swelling  Gastrointestinal: Negative for abdominal pain, constipation, diarrhea, nausea and vomiting  Endocrine: Negative for polydipsia and polyuria  Genitourinary: Negative for difficulty urinating, dysuria and hematuria  Musculoskeletal: Negative for back pain, neck pain and neck stiffness  Skin: Negative for pallor and rash  Neurological: Negative for dizziness, syncope, light-headedness and headaches  All other systems reviewed and are negative  Physical Exam  Physical Exam  Vitals and nursing note reviewed  Constitutional:       General: He is not in acute distress  Appearance: He is well-developed  HENT:      Head: Normocephalic and atraumatic  Eyes:      Pupils: Pupils are equal, round, and reactive to light  Cardiovascular:      Rate and Rhythm: Normal rate and regular rhythm  Pulses: Normal pulses  Heart sounds: Normal heart sounds  Pulmonary:      Effort: Pulmonary effort is normal  No respiratory distress  Breath sounds: Normal breath sounds  Abdominal:      General: There is no distension  Palpations: Abdomen is soft  Abdomen is not rigid  Tenderness: There is no abdominal tenderness  There is no guarding or rebound  Musculoskeletal:         General: No tenderness  Normal range of motion  Cervical back: Normal range of motion and neck supple  Right lower leg: Edema present  Left lower leg: Edema present  Lymphadenopathy:      Cervical: No cervical adenopathy  Skin:     General: Skin is warm and dry  Capillary Refill: Capillary refill takes less than 2 seconds  Findings: Wound (Extensive wound to the medial aspect of the left lower leg  Inferior aspect has foul-smelling drainage ) present  Neurological:      Mental Status: He is alert and oriented to person, place, and time  Cranial Nerves: No cranial nerve deficit  Sensory: No sensory deficit           Vital Signs  ED Triage Vitals   Temperature Pulse Respirations Blood Pressure SpO2   05/19/23 1432 05/19/23 1428 05/19/23 1428 05/19/23 1428 05/19/23 1428   97 9 °F (36 6 °C) 80 18 123/59 94 %      Temp src Heart Rate Source Patient Position - Orthostatic VS BP Location FiO2 (%)   -- -- 05/19/23 1428 05/19/23 1428 --     Sitting Left arm       Pain Score       05/19/23 1428       No Pain           Vitals:    05/19/23 1500 05/19/23 1530 05/19/23 1600 05/19/23 1630   BP: 99/53 120/59 125/56 119/60   Pulse: 69 73 73 74   Patient Position - Orthostatic VS:             Visual Acuity      ED Medications  Medications   sodium chloride 0 9 % bolus 500 mL (0 mL Intravenous Stopped 5/19/23 1627)       Diagnostic Studies  Results Reviewed     Procedure Component Value Units Date/Time    Comprehensive metabolic panel [[id number]]  (Abnormal) Collected: 05/19/23 1449    Lab Status: Final result Specimen: Blood from Arm, Right Updated: 05/19/23 1513     Sodium 138 mmol/L      Potassium 5 0 mmol/L      Chloride 105 mmol/L      CO2 25 mmol/L      ANION GAP 8 mmol/L      BUN 45 mg/dL      Creatinine 2 16 mg/dL      Glucose 107 mg/dL      Calcium 8 7 mg/dL      Corrected Calcium 9 3 mg/dL      AST 22 U/L      ALT 13 U/L      Alkaline Phosphatase 103 U/L      Total Protein 6 8 g/dL      Albumin 3 3 g/dL      Total Bilirubin 1 26 mg/dL      eGFR 26 ml/min/1 73sq m     Narrative:      National Kid[employer] Disease Foundation guidelines for Chronic Kidney Disease (CKD):   •  Stage 1 with normal or high GFR (GFR > 90 mL/min/1 73 square meters)  •  Stage 2 Mild CKD (GFR = 60-89 mL/min/1 73 square meters)  •  Stage 3A Moderate CKD (GFR = 45-59 mL/min/1 73 square meters)  •  Stage 3B Moderate CKD (GFR = 30-44 mL/min/1 73 square meters)  •  Stage 4 Severe CKD (GFR = 15-29 mL/min/1 73 square meters)  •  Stage 5 End Stage CKD (GFR <15 mL/min/1 73 square meters)  Note: GFR calculation is accurate only with a steady state creatinine    Protime-INR [[id number]]  (Abnormal) Collected: 05/19/23 1450    Lab Status: Final result Specimen: Blood from Arm, Right Updated: 05/19/23 1511     Protime 58 1 seconds      INR 6 64    CBC and differential [[id number]]  (Abnormal) Collected: 05/19/23 1449    Lab Status: Final result Specimen: Blood from Arm, Right Updated: 05/19/23 1456     WBC 5 05 Thousand/uL      RBC 2 71 Million/uL      Hemoglobin 7 8 g/dL      Hematocrit 25 8 %      MCV 95 fL      MCH 28 8 pg      MCHC 30 2 g/dL      RDW 19 2 %      MPV 9 0 fL      Platelets 138 Thousands/uL      nRBC 0 /100 WBCs      Neutrophils Relative 64 %      Immat GRANS % 0 %      Lymphocytes Relative 19 %      Monocytes Relative 12 %      Eosinophils Relative 4 %      Basophils Relative 1 %      Neutrophils Absolute 3 23 Thousands/µL      Immature Grans Absolute 0 02 Thousand/uL      Lymphocytes Absolute 0 95 Thousands/µL      Monocytes Absolute 0 59 Thousand/µL Eosinophils Absolute 0 21 Thousand/µL      Basophils Absolute 0 05 Thousands/µL                  No orders to display              Procedures  ECG 12 Lead Documentation Only    Date/Time: 5/19/2023 2:44 PM  Performed by: Tiago Turk DO  Authorized by: Tiago Turk DO     ECG reviewed by me, the ED Provider: yes    Patient location:  ED  Previous ECG:     Previous ECG:  Compared to current    Similarity:  No change    Comparison to cardiac monitor: Yes    Comments:      Sinus rhythm at a rate of 71 bpm   First-degree AV block  Right axis deviation  Left bundle branch block  Nonspecific ST-T wave abnormalities  Similar to previous from 4/4/2023  ED Course  ED Course as of 05/21/23 1024   Fri May 19, 2023   1514 BUN and creatinine elevated from baseline  Likely hypovolemia  We will give small fluid bolus  Continue to monitor blood pressure  SBIRT 22yo+    Flowsheet Row Most Recent Value   Initial Alcohol Screen: US AUDIT-C     1  How often do you have a drink containing alcohol? 0 Filed at: 05/19/2023 1436   2  How many drinks containing alcohol do you have on a typical day you are drinking? 0 Filed at: 05/19/2023 1436   3b  FEMALE Any Age, or MALE 65+: How often do you have 4 or more drinks on one occassion? 0 Filed at: 05/19/2023 1436   Audit-C Score 0 Filed at: 05/19/2023 1436   KRISTIAN: How many times in the past year have you    Used an illegal drug or used a prescription medication for non-medical reasons? Never Filed at: 05/19/2023 1436                    Medical Decision Making  Patient presents with episodes of hypotension  However patient has been asymptomatic  Patient has lower extremity wounds which are being followed by the wound care  He is afebrile and I do not suspect active infection/sepsis as cause of hypotension  He has an elevated INR, but has no evidence of bleeding  He has skin breakdown on buttocks, but no active bleeding   No "evidence of rectal bleeding  I do not suspect acute blood loss as cause of hypotension  Patient is on diuretics and his labs indicate volume depletion  I suspect volume depletion in addition to antihypertensives as cause of hypotension  Patient was given a fluid bolus and his blood pressure has been stable  Discussed hospitalization for further management and observation  However patient feels well and would like to go home  Given his stable vital signs, patient will be discharged home  I recommended he continue p o  hydration  I also recommended that he hold his dose of Coumadin on Saturday and Sunday  He should resume Coumadin on Monday and call his PCP for further instructions  Patient should return to ED if he develops lightheadedness, bleeding, shortness of breath or other concerns  Portions of the above record have been created with voice recognition software   Occasional wrong word or \"sound alike\" substitutions may have occurred due to the inherent limitations of voice recognition software   Read the chart carefully and recognize, using context, where substitutions may have occurred  Coagulopathy [facility]):     Details: Elevated INR  No evidence of active bleeding  He is therefore, no indication for reversal of anticoagulation  We will hold 2 doses of Coumadin and patient should follow-up with PCP  Volume depletion:     Details: Secondary to diuretics  Patient given a fluid bolus in the emergency department  He is asymptomatic and stable for discharge  Amount and/or Complexity of Data Reviewed  Labs: ordered  ECG/medicine tests: ordered and independent interpretation performed  Risk  OTC drugs            Disposition  Final diagnoses:   Coagulopathy ([address] )   Volume depletion     Time reflects when diagnosis was documented in both MDM as applicable and the Disposition within this note     Time User Action Codes Description Comment    5/19/2023  4:43 PM Adrián Ro Add [D68 9] " Coagulopathy ([facility] [address] )     5/19/2023  4:43 PM Clari Borja Add [E86 9] Volume depletion       ED Disposition     ED Disposition   Discharge    Condition   Stable    Date/Time   Fri May 19, 2023  4:43 PM    Comment   Dipesh oRssi discharge to home/self care  Follow-up Information     Follow up With Specialties Details Why Contact Will Mendez, DO Internal Medicine Schedule an appointment as soon as possible for a visit  Return to ED sooner if you develop lightheadedness, dizziness, shortness of breath, chest pain or other concerns  [address]  [phone]            Discharge Medication List as of 5/19/2023  4:45 PM      CONTINUE these medications which have NOT CHANGED    Details   Acetaminophen (TYLENOL EXTRA STRENGTH PO) Take 500 mg by mouth if needed (for pain as needed)  Indications: pain as needed, Historical Med      albuterol (PROVENTIL HFA,VENTOLIN HFA) 90 mcg/act inhaler Inhale 2 puffs every 6 (six) hours as needed for wheezing or shortness of breath, Starting Thu 3/4/2021, Historical Med      allopurinol (ZYLOPRIM) 100 mg tablet Take 1 tablet (100 mg total) by mouth daily Do not start before November 15, 2022 , Starting Tue 11/15/2022, Until Mon 2/13/2023, No Print      atorvastatin (LIPITOR) 40 mg tablet Take 40 mg by mouth daily after dinner Atorvastatin Calcium 40 MG Oral Tablet Take 1 tablet daily  Refills: 0  Active , Historical Med      B Complex-C (SUPER B COMPLEX PO) Take 1 capsule by mouth daily , Historical Med      benzonatate (TESSALON PERLES) 100 mg capsule Take 1 capsule (100 mg total) by mouth 3 (three) times a day as needed for cough, Starting Fri 4/7/2023, Normal      candesartan-hydrochlorothiazide (ATACAND HCT) 16-12 5 MG per tablet Take 1 tablet by mouth daily  Indications: High Blood Pressure Disorder, Historical Med      Cholecalciferol 25 MCG (1000 UT) capsule Take 3,000 Units by mouth daily   Indications: Vitamin D Deficiency, Historical Med      Diclofenac Sodium (VOLTAREN) 1 % Apply 2 g topically 4 (four) times a day, Starting Mon 11/14/2022, No Print      fluticasone (FLONASE) 50 mcg/act nasal spray 2 sprays into each nostril daily as needed Shake liquid and spray, Starting Wed 7/7/2021, Historical Med      gabapentin (NEURONTIN) 100 mg capsule Take 100 mg by mouth daily, Starting Tue 1/25/2022, Historical Med      Iron Combinations (NIFEREX) TABS Take 1 tablet by mouth in the morning, Starting Tue 2/13/2018, Historical Med      lubiprostone (AMITIZA) 24 mcg capsule Take 24 mcg by mouth 2 (two) times a day with meals  Indications: Chronic Constipation of Unknown Cause, Historical Med      metFORMIN (GLUCOPHAGE) 1000 MG tablet Take 1,000 mg by mouth daily with dinner  Indications: Type 2 Diabetes, Historical Med      metolazone (ZAROXOLYN) 2 5 mg tablet Take 1 tablet (2 5 mg total) by mouth 2 (two) times a week Take on Saturday AM and Thursday AM 30 minutes prior to AM torsemide , Starting Thu 4/27/2023, Normal      mirtazapine (REMERON) 7 5 MG tablet Take 15 mg by mouth daily at bedtime ordered by Dr Baltazar Messina, Starting Wed 11/30/2022, Historical Med      Multiple Vitamin (MULTIVITAMINS PO) Take 1 tablet by mouth daily, Historical Med      omeprazole (PriLOSEC) 20 mg delayed release capsule Omeprazole 20 MG Oral Tablet Delayed Release Take 1 tablet daily  Refills: 0  Active, Historical Med      polyethylene glycol (GLYCOLAX) 17 GM/SCOOP powder Take 17 g by mouth 2 (two) times a day, Historical Med      Potassium Chloride ER 20 MEQ TBCR Take 20 mEq by mouth in the morning  Indications: Low Amount of Potassium in the Blood, Starting Sat 4/8/2023, Historical Med      senna-docusate sodium (SENOKOT S) 8 6-50 mg per tablet Take 1 tablet by mouth daily at bedtime, Starting Fri 4/7/2023, Normal      sodium chloride, PF, 0 9 % 10 mL by Intracatheter route daily for 120 doses Intracatheter flushing daily   May substitute prefilled syringe with normal saline 10 mL vials, 10 mL syringes, and 18 g blunt needles, Starting Thu 4/6/2023, Until Fri 8/4/2023, Normal      sodium hypochlorite (DAKIN'S HALF-STRENGTH) external solution Apply 1 application  topically daily At each dressing change, Starting Tue 5/9/2023, Normal      sotalol (BETAPACE) 80 mg tablet Take 1 tablet (80 mg total) by mouth daily, Starting Mon 10/24/2022, No Print      torsemide (DEMADEX) 20 mg tablet Take 20 mg by mouth daily and 10 mg in PM, Historical Med      warfarin (COUMADIN) 2 mg tablet Take 2 tablets (4 mg total) by mouth daily Acknowledge, Starting Mon 10/24/2022, No Print             No discharge procedures on file      PDMP Review     None          ED Provider  Electronically Signed by           Diego Patel DO  05/21/23 1024

## 2023-05-20 ENCOUNTER — HOME CARE VISIT (OUTPATIENT)
Dept: HOME HEALTH SERVICES | Facility: HOME HEALTHCARE | Age: 88
End: 2023-05-20

## 2023-05-20 LAB
ATRIAL RATE: 95 BPM
QRS AXIS: 239 DEGREES
QRSD INTERVAL: 160 MS
QT INTERVAL: 480 MS
QTC INTERVAL: 521 MS
T WAVE AXIS: 78 DEGREES
VENTRICULAR RATE: 71 BPM

## 2023-05-23 ENCOUNTER — OFFICE VISIT (OUTPATIENT)
Dept: WOUND CARE | Facility: HOSPITAL | Age: 88
End: 2023-05-23

## 2023-05-23 VITALS
SYSTOLIC BLOOD PRESSURE: 111 MMHG | HEART RATE: 69 BPM | RESPIRATION RATE: 12 BRPM | TEMPERATURE: 96.8 F | DIASTOLIC BLOOD PRESSURE: 56 MMHG

## 2023-05-23 DIAGNOSIS — I87.311 CHRONIC VENOUS HYPERTENSION (IDIOPATHIC) WITH ULCER OF RIGHT LOWER EXTREMITY (HCC): Primary | ICD-10-CM

## 2023-05-23 DIAGNOSIS — L97.919 CHRONIC VENOUS HYPERTENSION (IDIOPATHIC) WITH ULCER OF RIGHT LOWER EXTREMITY (HCC): Primary | ICD-10-CM

## 2023-05-23 RX ORDER — LIDOCAINE 40 MG/G
CREAM TOPICAL ONCE
Status: COMPLETED | OUTPATIENT
Start: 2023-05-23 | End: 2023-05-23

## 2023-05-23 RX ADMIN — LIDOCAINE 1 APPLICATION.: 40 CREAM TOPICAL at 14:58

## 2023-05-23 NOTE — PROGRESS NOTES
"Patient ID: Bianca Tavarez is a 81 y o  male Date of Birth 1935     Chief Complaint  Chief Complaint   Patient presents with   • Follow Up Wound Care Visit     RLE wounds       Allergies  Tramadol    Assessment:    No problem-specific Assessment & Plan notes found for this encounter  Diagnoses and all orders for this visit:    Chronic venous hypertension (idiopathic) with ulcer of right lower extremity (HCC)  -     lidocaine (LMX) 4 % cream  -     Wound cleansing and dressings; Future  -     Wound compression and edema control; Future  -     Wound home care; Future  -     Wound miscellaneous orders; Future    Other orders  -     Debridement  -     Debridement              Debridement   Wound 01/13/23 Ankle Right;Medial    Universal Protocol:  Consent: Verbal consent obtained  Consent given by: patient  Time out: Immediately prior to procedure a \"time out\" was called to verify the correct patient, procedure, equipment, support staff and site/side marked as required  Timeout called at: 5/23/2023 2:27 PM   Patient understanding: patient states understanding of the procedure being performed  Patient identity confirmed: verbally with patient      Performed by: physician  Debridement type: surgical  Level of debridement: subcutaneous tissue  Pain control: lidocaine 4%  Post-debridement measurements  Length (cm): 5 5  Width (cm): 2 8  Depth (cm): 0 2  Percent debrided: 50%  Surface Area (cm^2): 15 4  Area debrided (cm^2): 7 7  Volume (cm^3): 3 08  Tissue and other material debrided: subcutaneous tissue  Devitalized tissue debrided: exudate and slough  Instrument(s) utilized: curette  Bleeding: small  Hemostasis obtained with: pressure  Response to treatment: procedure was tolerated well    Debridement   Universal Protocol:  Consent: Verbal consent obtained    Consent given by: patient  Time out: Immediately prior to procedure a \"time out\" was called to verify the correct patient, procedure, equipment, support " staff and site/side marked as required  Timeout called at: 5/23/2023 2:27 PM   Patient understanding: patient states understanding of the procedure being performed  Patient identity confirmed: verbally with patient      Performed by: physician  Debridement type: surgical  Level of debridement: subcutaneous tissue  Pain control: lidocaine 4%  Post-debridement measurements  Length (cm): 8  Width (cm): 7 8  Depth (cm): 0 2  Percent debrided: 100%  Surface Area (cm^2): 62 4  Area debrided (cm^2): 62 4  Volume (cm^3): 12 48  Tissue and other material debrided: subcutaneous tissue  Devitalized tissue debrided: exudate and slough  Instrument(s) utilized: curette  Bleeding: small  Hemostasis obtained with: pressure  Response to treatment: procedure was tolerated well          Plan:  Wounds are essentially unchanged  Wounds debrided as above  Continue wound management with silver alginate, see wound orders below  Continue compression with Ace wrap's  Keep legs elevated whenever seated and avoid prolonged standing  I recommend ED evaluation today for weakness, shortness of breath, and black stool which may indicate an active GI bleed and worsening anemia  Patient is agreeable to go to the ED today  Follow-up in the wound management center in 2 weeks or call sooner with questions or concerns    Wound 01/13/23 Ankle Right;Medial (Active)   Wound Image Images linked 05/23/23 1517   Wound Description Granulation tissue; Yellow;Slough;Pink;Epithelialization 05/23/23 1444   Deedee-wound Assessment Induration; Intact 05/23/23 1444   Wound Length (cm) 5 5 cm 05/23/23 1444   Wound Width (cm) 2 8 cm 05/23/23 1444   Wound Depth (cm) 0 1 cm 05/23/23 1444   Wound Surface Area (cm^2) 15 4 cm^2 05/23/23 1444   Wound Volume (cm^3) 1 54 cm^3 05/23/23 1444   Calculated Wound Volume (cm^3) 1 54 cm^3 05/23/23 1444   Change in Wound Size % 29 36 05/23/23 1444   Drainage Amount Large 05/23/23 1444   Drainage Description Serosanguineous 05/23/23 1444 Non-staged Wound Description Full thickness 05/23/23 1444   Patient Tolerance Tolerated well 05/23/23 1444   Dressing Status Intact 05/23/23 1444       Wound 03/03/23 Leg Right;Medial (Active)   Wound Image Images linked 05/23/23 1516   Wound Description Epithelialization;Granulation tissue;Pink;Slough; Yellow 05/23/23 1444   Deedee-wound Assessment Maceration; Intact 05/23/23 1444   Wound Length (cm) 8 cm 05/23/23 1444   Wound Width (cm) 7 8 cm 05/23/23 1444   Wound Depth (cm) 0 1 cm 05/23/23 1444   Wound Surface Area (cm^2) 62 4 cm^2 05/23/23 1444   Wound Volume (cm^3) 6 24 cm^3 05/23/23 1444   Calculated Wound Volume (cm^3) 6 24 cm^3 05/23/23 1444   Change in Wound Size % -1460 05/23/23 1444   Drainage Amount Large 05/23/23 1444   Drainage Description Serosanguineous 05/23/23 1444   Non-staged Wound Description Full thickness 05/23/23 1444   Patient Tolerance Tolerated well 05/23/23 1444   Dressing Status Intact 05/23/23 1444       Wound 01/13/23 Ankle Right;Medial (Active)   Date First Assessed/Time First Assessed: 01/13/23 1308   Location: Ankle  Wound Location Orientation: Right;Medial       Wound 03/03/23 Leg Right;Medial (Active)   Date First Assessed/Time First Assessed: 03/03/23 1329   Location: Leg  Wound Location Orientation: Right;Medial       [REMOVED] Incision 08/17/18 Leg Right (Removed)   Resolved Date: (c) 09/05/22  Date First Assessed/Time First Assessed: 08/17/18 1127   Location: Leg  Wound Location Orientation: Right       [REMOVED] Wound 09/05/22 Surgical Abdomen Right (Removed)   Resolved Date: 10/25/22  Date First Assessed/Time First Assessed: 09/05/22 1505   Pre-Existing Wound: Yes  Primary Wound Type: Surgical  Location: Abdomen  Wound Location Orientation: Right  Wound Description (Comments): IR Drain tube       [REMOVED] Wound 10/31/22 Surgical Abdomen Right;Quadrant; Upper (Removed)   Resolved Date: 11/30/22  Date First Assessed/Time First Assessed: 10/31/22 1422   Pre-Existing Wound:  Yes Primary Wound Type: Surgical  Location: Abdomen  Wound Location Orientation: Right;Quadrant; Upper  Wound Description (Comments): IR Gail tube p  [REMOVED] Wound 11/09/22 Venous Ulcer Ankle Right;Medial (Removed)   Resolved Date: 11/30/22  Date First Assessed/Time First Assessed: 11/09/22 2200   Pre-Existing Wound: No  Primary Wound Type: Venous Ulcer  Location: Ankle  Wound Location Orientation: Right;Medial  Dressing Status: Clean;Dry; Intact  Wound Outcome: He    [REMOVED] Wound 11/11/22 Pressure Injury Buttocks Right; Inner (Removed)   Resolved Date: 11/30/22  Date First Assessed/Time First Assessed: 11/11/22 1200   Pre-Existing Wound: No  Primary Wound Type: Pressure Injury  Location: Buttocks  Wound Location Orientation: Right; Inner  Wound Description (Comments): stage 2 pressure     [REMOVED] Wound 12/16/22 Other (comment) Leg Inner;Right (Removed)   Resolved Date: 03/03/23  Date First Assessed/Time First Assessed: 12/16/22 0909   Pre-Existing Wound: No  Primary Wound Type: Other (comment)  Location: Leg  Wound Location Orientation: Inner;Right  Dressing Status: Clean;Dry; Intact  Wound Outcome: Ot    [REMOVED] Wound 02/17/23 Venous Ulcer Leg Right; Lower;Mid;Anterior (Removed)   Resolved Date: 03/24/23  Date First Assessed/Time First Assessed: 02/17/23 1433   Primary Wound Type: Venous Ulcer  Location: Leg  Wound Location Orientation: Right; Lower;Mid;Anterior  Wound Outcome: Healed       Subjective:        Patient presents for follow-up of right lower extremity venous ulcers  No increased pain or drainage  Has been using silver alginate on the wounds and Ace wrap for compression  Patient was seen in the ED 3 days ago and was offered admission but he refused because he states that he felt better  Today patient reports that he was not feeling well  He states that he feels weak and short of breath  He noticed black stool this morning which is something new         The following portions of the patient's history were reviewed and updated as appropriate:   He  has a past medical history of Anemia, Arthritis, Atrial fibrillation ([place] [address] ), Basal cell carcinoma (03/22/2022), CHF (congestive heart failure) ([place] [address] ), Diabetes mellitus ([place] [address] ), DVT (deep vein thrombosis) in pregnancy (1966), DVT (deep venous thrombosis) ([place] [address] ) (1966), Dyslipidemia, Encephalopathy acute (11/4/2022), GERD (gastroesophageal reflux disease), Hyperlipidemia, Hypertension, Hypomagnesemia (10/19/2022), Irregular heart beat, Morbid obesity due to excess calories ([place] [address] ), Pulmonary embolism ([place] [address] ), Sepsis ([place] [address] ), Squamous cell skin cancer (07/30/2020), and Visual impairment    He   Patient Active Problem List    Diagnosis Date Noted   • 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   • Pneumonia of both lungs due to infectious organism 04/05/2023   • COVID-19 04/05/2023   • Pleural effusion on right 04/05/2023   • Calculus of gallbladder 03/08/2023   • Cholecystostomy care ([place] [address] ) 11/21/2022   • Gout 11/21/2022   • Acute on chronic cholecystitis 11/04/2022   • Acute cholecystitis 08/30/2022   • Benign prostatic hyperplasia with lower urinary tract symptoms 08/09/2022   • OBINNA (obstructive sleep apnea)    • CKD (chronic kidney disease) stage 3, GFR 30-59 ml/min ([place] [address] ) 05/07/2022   • Acute kidney injury ([place] [address] ) 11/18/2021   • Osteoarthritis, knee 11/15/2021   • Status post right knee replacement 11/15/2021   • Mixed hyperlipidemia 03/29/2021   • Swelling of limb 02/04/2021   • Chronic deep vein thrombosis (DVT) ([place] [address] ) 09/10/2020   • Secondary lymphedema 09/24/2018   • Venous ulcer of ankle, right ([place] [address] ) 07/11/2018   • S/P AVR 03/22/2018   • Thrombophlebitis 03/22/2018   • Presence of implantable cardioverter-defibrillator (ICD) 03/22/2018   • Chronic bilateral low back pain without sciatica 04/24/2017   • BPH (benign prostatic hyperplasia) 04/20/2017   • Obesity, unspecified obesity severity, unspecified obesity type 04/04/2017   • Hydrocele 04/04/2017   • Chronic anticoagulation 04/04/2017   • Anemia 04/04/2017   • Renal cyst 04/04/2017   • 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 08/11/2015   • Ventricular tachycardia 04/15/2014   • Paroxysmal atrial fibrillation (HCC) 03/19/2014   • Edema 03/19/2014   • Chronic diastolic CHF (congestive heart failure) ([place] [address] ) 03/19/2014   • Endocarditis 03/19/2014   • Type 2 diabetes mellitus with diabetic neuropathy, without long-term current use of insulin ([place] [address] ) 08/19/2013   • Peripheral venous insufficiency 08/19/2013   • Cardiomegaly 08/19/2013   • PVD (peripheral vascular disease) ([place] [address] ) 04/19/2013   • Dyslipidemia 04/19/2013     He  reports that he has never smoked  He has never used smokeless tobacco  He reports that he does not currently use alcohol  He reports that he does not use drugs  Current Outpatient Medications   Medication Sig Dispense Refill   • Acetaminophen (TYLENOL EXTRA STRENGTH PO) Take 500 mg by mouth if needed (for pain as needed)  Indications: pain as needed     • albuterol (PROVENTIL HFA,VENTOLIN HFA) 90 mcg/act inhaler Inhale 2 puffs every 6 (six) hours as needed for wheezing or shortness of breath     • allopurinol (ZYLOPRIM) 100 mg tablet Take 1 tablet (100 mg total) by mouth daily Do not start before November 15, 2022  (Patient not taking: Reported on 4/8/2023)     • atorvastatin (LIPITOR) 40 mg tablet Take 40 mg by mouth daily after dinner Atorvastatin Calcium 40 MG Oral Tablet Take 1 tablet daily  Refills: 0  Active      • B Complex-C (SUPER B COMPLEX PO) Take 1 capsule by mouth daily      • benzonatate (TESSALON PERLES) 100 mg capsule Take 1 capsule (100 mg total) by mouth 3 (three) times a day as needed for cough 20 capsule 0   • candesartan-hydrochlorothiazide (ATACAND HCT) 16-12 5 MG per tablet Take 1 tablet by mouth daily  Indications: High Blood Pressure Disorder     • Cholecalciferol 25 MCG (1000 UT) capsule Take 3,000 Units by mouth daily   Indications: Vitamin D Deficiency • Diclofenac Sodium (VOLTAREN) 1 % Apply 2 g topically 4 (four) times a day     • fluticasone (FLONASE) 50 mcg/act nasal spray 2 sprays into each nostril daily as needed Shake liquid and spray     • gabapentin (NEURONTIN) 100 mg capsule Take 100 mg by mouth daily     • Iron Combinations (NIFEREX) TABS Take 1 tablet by mouth in the morning  5   • lubiprostone (AMITIZA) 24 mcg capsule Take 24 mcg by mouth 2 (two) times a day with meals  Indications: Chronic Constipation of Unknown Cause     • metFORMIN (GLUCOPHAGE) 1000 MG tablet Take 1,000 mg by mouth daily with dinner  Indications: Type 2 Diabetes     • metolazone (ZAROXOLYN) 2 5 mg tablet Take 1 tablet (2 5 mg total) by mouth 2 (two) times a week Take on Saturday AM and Thursday AM 30 minutes prior to AM torsemide  30 tablet 5   • mirtazapine (REMERON) 7 5 MG tablet Take 15 mg by mouth daily at bedtime ordered by Dr Tracie Rangel     • Multiple Vitamin (MULTIVITAMINS PO) Take 1 tablet by mouth daily     • omeprazole (PriLOSEC) 20 mg delayed release capsule Omeprazole 20 MG Oral Tablet Delayed Release Take 1 tablet daily  Refills: 0  Active     • polyethylene glycol (GLYCOLAX) 17 GM/SCOOP powder Take 17 g by mouth 2 (two) times a day     • Potassium Chloride ER 20 MEQ TBCR Take 20 mEq by mouth in the morning  Indications: Low Amount of Potassium in the Blood     • senna-docusate sodium (SENOKOT S) 8 6-50 mg per tablet Take 1 tablet by mouth daily at bedtime 30 tablet 0   • sodium chloride, PF, 0 9 % 10 mL by Intracatheter route daily Intracatheter flushing daily  May substitute prefilled syringe with normal saline 10 mL vials, 10 mL syringes, and 18 g blunt needles (Patient taking differently: 10 mL by Intracatheter route every 12 (twelve) hours Intracatheter flushing daily   May substitute prefilled syringe with normal saline 10 mL vials, 10 mL syringes, and 18 g blunt needles) 300 mL 2   • sodium chloride, PF, 0 9 % 10 mL by Intracatheter route daily for 120 doses Intracatheter flushing daily  May substitute prefilled syringe with normal saline 10 mL vials, 10 mL syringes, and 18 g blunt needles 300 mL 3   • sodium hypochlorite (DAKIN'S HALF-STRENGTH) external solution Apply 1 application  topically daily At each dressing change 473 mL 0   • sotalol (BETAPACE) 80 mg tablet Take 1 tablet (80 mg total) by mouth daily  0   • torsemide (DEMADEX) 20 mg tablet Take 20 mg by mouth daily and 10 mg in PM     • warfarin (COUMADIN) 2 mg tablet Take 2 tablets (4 mg total) by mouth daily Acknowledge (Patient taking differently: Take 4 mg by mouth daily Pt taking 6mg Tue/Thur/Sat and 4 mg M/W/F/Sun) 14 tablet 0     No current facility-administered medications for this visit  Facility-Administered Medications Ordered in Other Visits   Medication Dose Route Frequency Provider Last Rate Last Admin   • cefTRIAXone (ROCEPHIN) 2,000 mg in dextrose 5 % 50 mL IVPB  2,000 mg Intravenous Once Antione Ng MD       • sodium chloride 0 9 % infusion  75 mL/hr Intravenous Continuous Antione Ng MD 75 mL/hr at 03/08/23 1000 75 mL/hr at 03/08/23 1000     He is allergic to tramadol       Review of Systems   Constitutional: Negative for chills and fever  HENT: Negative for congestion and sneezing  Respiratory: Negative for cough  Cardiovascular: Positive for leg swelling  Gastrointestinal: Negative for blood in stool  Musculoskeletal: Positive for gait problem  Skin: Positive for wound  Neurological: Positive for weakness  Psychiatric/Behavioral: Negative for agitation  Objective:       Wound 01/13/23 Ankle Right;Medial (Active)   Wound Image Images linked 05/23/23 1517   Wound Description Granulation tissue; Yellow;Slough;Pink;Epithelialization 05/23/23 1444   Deedee-wound Assessment Induration; Intact 05/23/23 1444   Wound Length (cm) 5 5 cm 05/23/23 1444   Wound Width (cm) 2 8 cm 05/23/23 1444   Wound Depth (cm) 0 1 cm 05/23/23 1444   Wound Surface Area (cm^2) 15 4 cm^2 05/23/23 1444   Wound Volume (cm^3) 1 54 cm^3 05/23/23 1444   Calculated Wound Volume (cm^3) 1 54 cm^3 05/23/23 1444   Change in Wound Size % 29 36 05/23/23 1444   Drainage Amount Large 05/23/23 1444   Drainage Description Serosanguineous 05/23/23 1444   Non-staged Wound Description Full thickness 05/23/23 1444   Patient Tolerance Tolerated well 05/23/23 1444   Dressing Status Intact 05/23/23 1444       Wound 03/03/23 Leg Right;Medial (Active)   Wound Image Images linked 05/23/23 1516   Wound Description Epithelialization;Granulation tissue;Pink;Slough; Yellow 05/23/23 1444   Deedee-wound Assessment Maceration; Intact 05/23/23 1444   Wound Length (cm) 8 cm 05/23/23 1444   Wound Width (cm) 7 8 cm 05/23/23 1444   Wound Depth (cm) 0 1 cm 05/23/23 1444   Wound Surface Area (cm^2) 62 4 cm^2 05/23/23 1444   Wound Volume (cm^3) 6 24 cm^3 05/23/23 1444   Calculated Wound Volume (cm^3) 6 24 cm^3 05/23/23 1444   Change in Wound Size % -1460 05/23/23 1444   Drainage Amount Large 05/23/23 1444   Drainage Description Serosanguineous 05/23/23 1444   Non-staged Wound Description Full thickness 05/23/23 1444   Patient Tolerance Tolerated well 05/23/23 1444   Dressing Status Intact 05/23/23 1444       /56   Pulse 69   Temp (!) 96 8 °F (36 °C)   Resp 12     Physical Exam  Vitals reviewed  Constitutional:       General: He is not in acute distress  Appearance: Normal appearance  He is obese  He is not toxic-appearing or diaphoretic  HENT:      Head: Normocephalic and atraumatic  Right Ear: External ear normal       Left Ear: External ear normal    Eyes:      Conjunctiva/sclera: Conjunctivae normal    Pulmonary:      Effort: Pulmonary effort is normal  No respiratory distress  Musculoskeletal:      Cervical back: Neck supple  Right lower leg: Edema present  Left lower leg: Edema present  Skin:     Comments: See wound assessment   Neurological:      Mental Status: He is alert     Psychiatric: Mood and Affect: Mood normal          Behavior: Behavior normal            Wound 01/13/23 Ankle Right;Medial (Active)   Wound Image   05/23/23 1517   Wound Description Granulation tissue; Yellow;Slough;Pink;Epithelialization 05/23/23 1444   Deedee-wound Assessment Induration; Intact 05/23/23 1444   Wound Length (cm) 5 5 cm 05/23/23 1444   Wound Width (cm) 2 8 cm 05/23/23 1444   Wound Depth (cm) 0 1 cm 05/23/23 1444   Wound Surface Area (cm^2) 15 4 cm^2 05/23/23 1444   Wound Volume (cm^3) 1 54 cm^3 05/23/23 1444   Calculated Wound Volume (cm^3) 1 54 cm^3 05/23/23 1444   Change in Wound Size % 29 36 05/23/23 1444   Drainage Amount Large 05/23/23 1444   Drainage Description Serosanguineous 05/23/23 1444   Non-staged Wound Description Full thickness 05/23/23 1444   Patient Tolerance Tolerated well 05/23/23 1444   Dressing Status Intact 05/23/23 1444       Wound 03/03/23 Leg Right;Medial (Active)   Wound Image   05/23/23 1516   Wound Description Epithelialization;Granulation tissue;Pink;Slough; Yellow 05/23/23 1444   Deedee-wound Assessment Maceration; Intact 05/23/23 1444   Wound Length (cm) 8 cm 05/23/23 1444   Wound Width (cm) 7 8 cm 05/23/23 1444   Wound Depth (cm) 0 1 cm 05/23/23 1444   Wound Surface Area (cm^2) 62 4 cm^2 05/23/23 1444   Wound Volume (cm^3) 6 24 cm^3 05/23/23 1444   Calculated Wound Volume (cm^3) 6 24 cm^3 05/23/23 1444   Change in Wound Size % -1460 05/23/23 1444   Drainage Amount Large 05/23/23 1444   Drainage Description Serosanguineous 05/23/23 1444   Non-staged Wound Description Full thickness 05/23/23 1444   Patient Tolerance Tolerated well 05/23/23 1444   Dressing Status Intact 05/23/23 1444                       Wound Instructions:  Orders Placed This Encounter   Procedures   • Wound cleansing and dressings     Wound location: Right ankle and Right lower leg medial  Change dressing 3 times a week and as needed  You may remove the dressing and shower   Do not leave wound open to air, apply new dressing immediately  Soak wound with 1/4 strength dakins moistened gauze for 10 mins  Remove gauze and rinse well with saline  Apply Zinc cream to foster wound  Apply Alginate Ag to the open wounds  Cover with [employer] absorbent pad, may use equivalent if unable to obtain mextra  Secure with roll guaze and tape    Apply Silver alginate yo skin irritation area on Left lower leg   Cover with gauze   Secure with roll gauze and tape    Apply ace wrap on Right lower leg    Above treatment performed at wound care center     Standing Status:   Future     Standing Expiration Date:   5/23/2024   • Wound compression and edema control     Right Legz; Ace Bandage: Apply from base of toes to behind the knee  Apply in AM, may remove for sleep  Avoid prolonged standing in one place  Elevate leg(s) above the level of the heart when sitting or as much as possible  Continue use of compression pumps at home     Standing Status:   Future     Standing Expiration Date:   5/23/2024   • Wound home care     Continue [facility] for wound care     Standing Status:   Future     Standing Expiration Date:   5/23/2024   • Wound miscellaneous orders     Please visit ER today to evaluate HGB again  Standing Status:   Future     Standing Expiration Date:   5/23/2024   • Debridement     This order was created via procedure documentation   • Debridement     This order was created via procedure documentation        Diagnosis ICD-10-CM Associated Orders   1   Chronic venous hypertension (idiopathic) with ulcer of right lower extremity (HCC)  I87 [id number] lidocaine (LMX) 4 % cream    L97 [id number] Wound cleansing and dressings     Wound compression and edema control     Wound home care     Wound miscellaneous orders

## 2023-05-23 NOTE — PATIENT INSTRUCTIONS
Orders Placed This Encounter   Procedures    Wound cleansing and dressings     Wound location: Right ankle and Right lower leg medial  Change dressing 3 times a week and as needed  You may remove the dressing and shower  Do not leave wound open to air, apply new dressing immediately  Soak wound with 1/4 strength dakins moistened gauze for 10 mins  Remove gauze and rinse well with saline  Apply Zinc cream to foster wound  Apply Alginate Ag to the open wounds  Cover with [employer] absorbent pad, may use equivalent if unable to obtain mextra  Secure with roll guaze and tape    Apply Silver alginate yo skin irritation area on Left lower leg   Cover with gauze   Secure with roll gauze and tape    Apply ace wrap on Right lower leg    Above treatment performed at wound care center     Standing Status:   Future     Standing Expiration Date:   5/23/2024    Wound compression and edema control     Right Legz; Ace Bandage: Apply from base of toes to behind the knee  Apply in AM, may remove for sleep  Avoid prolonged standing in one place  Elevate leg(s) above the level of the heart when sitting or as much as possible  Continue use of compression pumps at home     Standing Status:   Future     Standing Expiration Date:   5/23/2024    Wound home care     Continue [facility] for wound care     Standing Status:   Future     Standing Expiration Date:   5/23/2024    Wound miscellaneous orders     Please visit ER today to evaluate HGB again       Standing Status:   Future     Standing Expiration Date:   5/23/2024

## 2023-05-24 ENCOUNTER — HOSPITAL ENCOUNTER (INPATIENT)
Facility: HOSPITAL | Age: 88
LOS: 5 days | Discharge: HOME | End: 2023-05-29
Attending: EMERGENCY MEDICINE | Admitting: FAMILY MEDICINE

## 2023-05-24 ENCOUNTER — OFFICE VISIT (OUTPATIENT)
Dept: CARDIOLOGY CLINIC | Facility: CLINIC | Age: 88
End: 2023-05-24

## 2023-05-24 ENCOUNTER — IN-CLINIC DEVICE VISIT (OUTPATIENT)
Dept: CARDIOLOGY CLINIC | Facility: CLINIC | Age: 88
End: 2023-05-24

## 2023-05-24 ENCOUNTER — APPOINTMENT (EMERGENCY)
Dept: RADIOLOGY | Facility: HOSPITAL | Age: 88
End: 2023-05-24

## 2023-05-24 VITALS — HEART RATE: 62 BPM | DIASTOLIC BLOOD PRESSURE: 58 MMHG | SYSTOLIC BLOOD PRESSURE: 98 MMHG

## 2023-05-24 DIAGNOSIS — E78.2 MIXED HYPERLIPIDEMIA: Chronic | ICD-10-CM

## 2023-05-24 DIAGNOSIS — R19.5 DARK STOOLS: ICD-10-CM

## 2023-05-24 DIAGNOSIS — Z95.810 PRESENCE OF IMPLANTABLE CARDIOVERTER-DEFIBRILLATOR (ICD): Chronic | ICD-10-CM

## 2023-05-24 DIAGNOSIS — I83.013 VENOUS ULCER OF ANKLE, RIGHT (HCC): ICD-10-CM

## 2023-05-24 DIAGNOSIS — I82.5Y9 CHRONIC DEEP VEIN THROMBOSIS (DVT) OF PROXIMAL VEIN OF LOWER EXTREMITY, UNSPECIFIED LATERALITY (HCC): Chronic | ICD-10-CM

## 2023-05-24 DIAGNOSIS — Z95.2 S/P AVR: ICD-10-CM

## 2023-05-24 DIAGNOSIS — L97.319 VENOUS ULCER OF ANKLE, RIGHT (HCC): ICD-10-CM

## 2023-05-24 DIAGNOSIS — E78.5 DYSLIPIDEMIA: ICD-10-CM

## 2023-05-24 DIAGNOSIS — N18.30 STAGE 3 CHRONIC KIDNEY DISEASE, UNSPECIFIED WHETHER STAGE 3A OR 3B CKD (HCC): Chronic | ICD-10-CM

## 2023-05-24 DIAGNOSIS — D64.9 ACUTE ON CHRONIC ANEMIA: ICD-10-CM

## 2023-05-24 DIAGNOSIS — K81.2 ACUTE ON CHRONIC CHOLECYSTITIS: ICD-10-CM

## 2023-05-24 DIAGNOSIS — K92.1 MELENA: ICD-10-CM

## 2023-05-24 DIAGNOSIS — I50.32 CHRONIC DIASTOLIC CHF (CONGESTIVE HEART FAILURE) (HCC): Chronic | ICD-10-CM

## 2023-05-24 DIAGNOSIS — I48.0 PAROXYSMAL ATRIAL FIBRILLATION (HCC): Chronic | ICD-10-CM

## 2023-05-24 DIAGNOSIS — I47.20 VENTRICULAR TACHYCARDIA (HCC): ICD-10-CM

## 2023-05-24 DIAGNOSIS — I48.0 PAROXYSMAL ATRIAL FIBRILLATION (HCC): Primary | ICD-10-CM

## 2023-05-24 DIAGNOSIS — I48.0 PAF (PAROXYSMAL ATRIAL FIBRILLATION) (HCC): ICD-10-CM

## 2023-05-24 DIAGNOSIS — N17.9 AKI (ACUTE KIDNEY INJURY) (HCC): ICD-10-CM

## 2023-05-24 DIAGNOSIS — Z95.810 AICD (AUTOMATIC CARDIOVERTER/DEFIBRILLATOR) PRESENT: Primary | ICD-10-CM

## 2023-05-24 DIAGNOSIS — I73.9 PVD (PERIPHERAL VASCULAR DISEASE) (HCC): ICD-10-CM

## 2023-05-24 DIAGNOSIS — I50.9 CHF (CONGESTIVE HEART FAILURE) (HCC): Primary | ICD-10-CM

## 2023-05-24 LAB
2HR DELTA HS TROPONIN: -1 NG/L
4HR DELTA HS TROPONIN: -1 NG/L
ALBUMIN SERPL BCP-MCNC: 2.7 G/DL (ref 3.5–5)
ALP SERPL-CCNC: 103 U/L (ref 46–116)
ALT SERPL W P-5'-P-CCNC: 21 U/L (ref 12–78)
ANION GAP SERPL CALCULATED.3IONS-SCNC: 4 MMOL/L (ref 4–13)
APTT PPP: 48 SECONDS (ref 23–37)
AST SERPL W P-5'-P-CCNC: 34 U/L (ref 5–45)
ATRIAL RATE: 67 BPM
BACTERIA UR QL AUTO: ABNORMAL /HPF
BASOPHILS # BLD AUTO: 0.04 THOUSANDS/ÂΜL (ref 0–0.1)
BASOPHILS NFR BLD AUTO: 1 % (ref 0–1)
BILIRUB SERPL-MCNC: 1.53 MG/DL (ref 0.2–1)
BILIRUB UR QL STRIP: NEGATIVE
BNP SERPL-MCNC: 493 PG/ML (ref 0–100)
BUN SERPL-MCNC: 45 MG/DL (ref 5–25)
CALCIUM ALBUM COR SERPL-MCNC: 9.7 MG/DL (ref 8.3–10.1)
CALCIUM SERPL-MCNC: 8.7 MG/DL (ref 8.3–10.1)
CARDIAC TROPONIN I PNL SERPL HS: 11 NG/L
CARDIAC TROPONIN I PNL SERPL HS: 11 NG/L
CARDIAC TROPONIN I PNL SERPL HS: 12 NG/L
CHLORIDE SERPL-SCNC: 107 MMOL/L (ref 96–108)
CLARITY UR: CLEAR
CO2 SERPL-SCNC: 24 MMOL/L (ref 21–32)
COLOR UR: COLORLESS
CREAT SERPL-MCNC: 2.06 MG/DL (ref 0.6–1.3)
EOSINOPHIL # BLD AUTO: 0.18 THOUSAND/ÂΜL (ref 0–0.61)
EOSINOPHIL NFR BLD AUTO: 3 % (ref 0–6)
ERYTHROCYTE [DISTWIDTH] IN BLOOD BY AUTOMATED COUNT: 19.5 % (ref 11.6–15.1)
GFR SERPL CREATININE-BSD FRML MDRD: 28 ML/MIN/1.73SQ M
GLUCOSE SERPL-MCNC: 110 MG/DL (ref 65–140)
GLUCOSE SERPL-MCNC: 127 MG/DL (ref 65–140)
GLUCOSE UR STRIP-MCNC: NEGATIVE MG/DL
GRAN CASTS #/AREA URNS LPF: ABNORMAL /[LPF]
HCT VFR BLD AUTO: 24 % (ref 36.5–49.3)
HGB BLD-MCNC: 7.5 G/DL (ref 12–17)
HGB UR QL STRIP.AUTO: ABNORMAL
IMM GRANULOCYTES # BLD AUTO: 0.03 THOUSAND/UL (ref 0–0.2)
IMM GRANULOCYTES NFR BLD AUTO: 1 % (ref 0–2)
INR PPP: 2.95 (ref 0.84–1.19)
KETONES UR STRIP-MCNC: NEGATIVE MG/DL
LEUKOCYTE ESTERASE UR QL STRIP: NEGATIVE
LYMPHOCYTES # BLD AUTO: 1 THOUSANDS/ÂΜL (ref 0.6–4.47)
LYMPHOCYTES NFR BLD AUTO: 17 % (ref 14–44)
MAGNESIUM SERPL-MCNC: 2 MG/DL (ref 1.6–2.6)
MCH RBC QN AUTO: 29.4 PG (ref 26.8–34.3)
MCHC RBC AUTO-ENTMCNC: 31.3 G/DL (ref 31.4–37.4)
MCV RBC AUTO: 94 FL (ref 82–98)
MONOCYTES # BLD AUTO: 0.62 THOUSAND/ÂΜL (ref 0.17–1.22)
MONOCYTES NFR BLD AUTO: 11 % (ref 4–12)
NEUTROPHILS # BLD AUTO: 3.88 THOUSANDS/ÂΜL (ref 1.85–7.62)
NEUTS SEG NFR BLD AUTO: 67 % (ref 43–75)
NITRITE UR QL STRIP: NEGATIVE
NON-SQ EPI CELLS URNS QL MICRO: ABNORMAL /HPF
NRBC BLD AUTO-RTO: 0 /100 WBCS
PH UR STRIP.AUTO: 5 [PH]
PHOSPHATE SERPL-MCNC: 4 MG/DL (ref 2.3–4.1)
PLATELET # BLD AUTO: 256 THOUSANDS/UL (ref 149–390)
PMV BLD AUTO: 9.4 FL (ref 8.9–12.7)
POTASSIUM SERPL-SCNC: 4.3 MMOL/L (ref 3.5–5.3)
PROT SERPL-MCNC: 7.4 G/DL (ref 6.4–8.4)
PROT UR STRIP-MCNC: NEGATIVE MG/DL
PROTHROMBIN TIME: 31 SECONDS (ref 11.6–14.5)
QRS AXIS: 135 DEGREES
QRSD INTERVAL: 160 MS
QT INTERVAL: 484 MS
QTC INTERVAL: 525 MS
RBC # BLD AUTO: 2.55 MILLION/UL (ref 3.88–5.62)
RBC #/AREA URNS AUTO: ABNORMAL /HPF
SODIUM SERPL-SCNC: 135 MMOL/L (ref 135–147)
SP GR UR STRIP.AUTO: 1.01 (ref 1–1.03)
T WAVE AXIS: -12 DEGREES
UROBILINOGEN UR STRIP-ACNC: <2 MG/DL
VENTRICULAR RATE: 71 BPM
WBC # BLD AUTO: 5.75 THOUSAND/UL (ref 4.31–10.16)
WBC #/AREA URNS AUTO: ABNORMAL /HPF

## 2023-05-24 RX ORDER — SOTALOL HYDROCHLORIDE 80 MG/1
80 TABLET ORAL DAILY
Status: DISCONTINUED | OUTPATIENT
Start: 2023-05-25 | End: 2023-05-29 | Stop reason: HOSPADM

## 2023-05-24 RX ORDER — PANTOPRAZOLE SODIUM 40 MG/1
40 TABLET, DELAYED RELEASE ORAL
Status: DISCONTINUED | OUTPATIENT
Start: 2023-05-25 | End: 2023-05-29 | Stop reason: HOSPADM

## 2023-05-24 RX ORDER — INSULIN LISPRO 100 [IU]/ML
1-6 INJECTION, SOLUTION INTRAVENOUS; SUBCUTANEOUS
Status: DISCONTINUED | OUTPATIENT
Start: 2023-05-25 | End: 2023-05-29 | Stop reason: HOSPADM

## 2023-05-24 RX ORDER — ATORVASTATIN CALCIUM 40 MG/1
40 TABLET, FILM COATED ORAL
Status: DISCONTINUED | OUTPATIENT
Start: 2023-05-24 | End: 2023-05-29 | Stop reason: HOSPADM

## 2023-05-24 RX ORDER — ALBUTEROL SULFATE 90 UG/1
2 AEROSOL, METERED RESPIRATORY (INHALATION) EVERY 6 HOURS PRN
Status: DISCONTINUED | OUTPATIENT
Start: 2023-05-24 | End: 2023-05-29 | Stop reason: HOSPADM

## 2023-05-24 RX ORDER — POTASSIUM CHLORIDE 750 MG/1
20 TABLET, EXTENDED RELEASE ORAL DAILY
Status: DISCONTINUED | OUTPATIENT
Start: 2023-05-25 | End: 2023-05-27

## 2023-05-24 RX ORDER — TORSEMIDE 10 MG/1
10 TABLET ORAL EVERY EVENING
Status: DISCONTINUED | OUTPATIENT
Start: 2023-05-24 | End: 2023-05-25

## 2023-05-24 RX ORDER — GABAPENTIN 100 MG/1
100 CAPSULE ORAL DAILY
Status: DISCONTINUED | OUTPATIENT
Start: 2023-05-25 | End: 2023-05-29 | Stop reason: HOSPADM

## 2023-05-24 RX ORDER — TORSEMIDE 20 MG/1
20 TABLET ORAL DAILY
Status: DISCONTINUED | OUTPATIENT
Start: 2023-05-25 | End: 2023-05-25

## 2023-05-24 RX ORDER — INSULIN LISPRO 100 [IU]/ML
1-5 INJECTION, SOLUTION INTRAVENOUS; SUBCUTANEOUS
Status: DISCONTINUED | OUTPATIENT
Start: 2023-05-24 | End: 2023-05-29 | Stop reason: HOSPADM

## 2023-05-24 RX ORDER — ACETAMINOPHEN 325 MG/1
650 TABLET ORAL EVERY 4 HOURS PRN
Status: DISCONTINUED | OUTPATIENT
Start: 2023-05-24 | End: 2023-05-29 | Stop reason: HOSPADM

## 2023-05-24 RX ORDER — METOLAZONE 2.5 MG/1
2.5 TABLET ORAL 2 TIMES WEEKLY
Status: DISCONTINUED | OUTPATIENT
Start: 2023-05-25 | End: 2023-05-25

## 2023-05-24 RX ORDER — ONDANSETRON 2 MG/ML
4 INJECTION INTRAMUSCULAR; INTRAVENOUS EVERY 6 HOURS PRN
Status: DISCONTINUED | OUTPATIENT
Start: 2023-05-24 | End: 2023-05-29 | Stop reason: HOSPADM

## 2023-05-24 RX ORDER — FLUTICASONE PROPIONATE 50 MCG
2 SPRAY, SUSPENSION (ML) NASAL DAILY PRN
Status: DISCONTINUED | OUTPATIENT
Start: 2023-05-24 | End: 2023-05-29 | Stop reason: HOSPADM

## 2023-05-24 NOTE — PROGRESS NOTES
Cardiology   Doron Santos 81 y o  male MRN: [id number]      Impression:  1  s/p aortic valve replacement for endocarditis - doing well  Echo 0/30 Nml LV systolic function and normal bio AVR  2  Hypertension - improved control    3  s/p ICD for ventricular tachycardia  4  DVT - on warfarin   5  Paroxysmal atrial fibrillation - Paced   on Sotalol (was decreased to daily when in hospital)  On anticoagulation    6  Chronic diastolic heart failure/PH/RV failure - worsening  7  Cholecystitis - had abscess formation around site of perc jeny drain  8  Recent COVID pneumonia  9  Lightheadedness - given dark stool, anemia, and worsening renal function, along with hypotension, concerned for further volume depletion         Recommendations:  1  Refer to ED  Concerned for anemia/renal failure  2  Follow up in one month  HPI: Doron Santos is a 81y o  year old male with morbid obesity, diastolic HF with PH/RV failure, hypertension,  aortic valve replacement for endocarditis who returns for follow up  Admitted to hospital 5/22 with dyspnea/fluid overload - had preserved LV LV systolic function, mild-mod MR, mod TR, but elevated PAP and RV dysfunction   Recently had cholecystostomy tube removed - was treated for abscess 3 weeks ago  Dark stool started on 5/19/23  Went to ED - INR elevated, anemic, and worsening renal function  Now hypotensive  Increased fatigue and shortness of breath  Review of Systems   Constitutional: Positive for fatigue  HENT: Negative  Eyes: Negative  Respiratory: Positive for shortness of breath  Negative for chest tightness  Cardiovascular: Negative for chest pain, palpitations and leg swelling  Gastrointestinal: Negative  Endocrine: Negative  Genitourinary: Negative  Musculoskeletal: Negative  Skin: Negative  Allergic/Immunologic: Negative  Neurological: Negative  Hematological: Negative  Psychiatric/Behavioral: Negative      All other systems reviewed and are negative          Past Medical History:   Diagnosis Date   • Anemia    • Arthritis    • Atrial fibrillation ([facility] )    • Basal cell carcinoma 03/22/2022    Tip of Nose   • CHF (congestive heart failure) (HCC)    • Diabetes mellitus ([facility] )     Niddm   • DVT (deep vein thrombosis) in pregnancy 1966    not in pregnancy   • DVT (deep venous thrombosis) ([facility] ) 1966   • Dyslipidemia    • Encephalopathy acute 11/4/2022   • GERD (gastroesophageal reflux disease)    • Hyperlipidemia    • Hypertension    • Hypomagnesemia 10/19/2022   • Irregular heart beat     Afib   • Morbid obesity due to excess calories ([facility] )     Resolved 9/2/2014    • Pulmonary embolism (HCC)    • Sepsis ([facility] )    • Squamous cell skin cancer 07/30/2020    Left posterior scalp   • Visual impairment      Past Surgical History:   Procedure Laterality Date   • AORTIC VALVE REPLACEMENT     • CARDIAC DEFIBRILLATOR PLACEMENT  04/2014   • CARDIAC SURGERY  02/2014    AVR   • COLONOSCOPY     • INSERT / REPLACE / REMOVE PACEMAKER     • IR ASPIRATION BAKERS CYST  3/8/2023   • IR CHOLECYSTOSTOMY TUBE CHECK/CHANGE/REPOSITION/REINSERTION/UPSIZE  10/13/2022   • IR CHOLECYSTOSTOMY TUBE CHECK/CHANGE/REPOSITION/REINSERTION/UPSIZE  10/19/2022   • IR CHOLECYSTOSTOMY TUBE CHECK/CHANGE/REPOSITION/REINSERTION/UPSIZE  10/27/2022   • IR CHOLECYSTOSTOMY TUBE CHECK/CHANGE/REPOSITION/REINSERTION/UPSIZE  11/7/2022   • IR CHOLECYSTOSTOMY TUBE CHECK/CHANGE/REPOSITION/REINSERTION/UPSIZE  11/10/2022   • IR CHOLECYSTOSTOMY TUBE CHECK/CHANGE/REPOSITION/REINSERTION/UPSIZE  12/1/2022   • IR CHOLECYSTOSTOMY TUBE CHECK/CHANGE/REPOSITION/REINSERTION/UPSIZE  1/6/2023   • IR CHOLECYSTOSTOMY TUBE CHECK/CHANGE/REPOSITION/REINSERTION/UPSIZE  1/24/2023   • IR CHOLECYSTOSTOMY TUBE CHECK/CHANGE/REPOSITION/REINSERTION/UPSIZE  3/15/2023   • IR CHOLECYSTOSTOMY TUBE PLACEMENT  9/1/2022   • IR DRAINAGE TUBE CHECK AND/OR REMOVAL  3/22/2023   • IR DRAINAGE TUBE CHECK AND/OR REMOVAL  4/10/2023   • IR DRAINAGE TUBE PLACEMENT  4/6/2023   • JOINT REPLACEMENT Left     LTKR   • MOHS SURGERY  07/30/2020    Left posterior scalp, Dr Maxwell Rangel   • [address] [facility] [address]  04/18/20[facility]    [facility] Tip of Nose- Dr Maxwell Rangel   • [place] DIVJ&STRIP LONG SAPH SAPHFEM Shanon Morgan Right 8/17/2018    Procedure: LEG PERFORATED INJECTION SCLEROTHERAPY;  Surgeon: Olivia Preston MD;  Location: [facility];  Service: Vascular   • REPLACEMENT TOTAL KNEE Right    • TOTAL KNEE ARTHROPLASTY Left    • VASCULAR SURGERY     • VENA CAVA FILTER PLACEMENT      Interruption inferior vena cava, [place] filter, placement   • WISDOM TOOTH EXTRACTION       Social History     Substance and Sexual Activity   Alcohol Use Not Currently    Comment: no alcohol in 28 yrs     Social History     Substance and Sexual Activity   Drug Use Never     Social History     Tobacco Use   Smoking Status Never   Smokeless Tobacco Never     Family History   Problem Relation Age of Onset   • Arthritis Mother    • Stroke Mother    • Arthritis Father    • Cancer Father    • Arthritis Daughter        Allergies: Allergies   Allergen Reactions   • Tramadol Other (See Comments)     intolerance       Medications:     Current Outpatient Medications:   •  Acetaminophen (TYLENOL EXTRA STRENGTH PO), Take 500 mg by mouth if needed (for pain as needed)   Indications: pain as needed, Disp: , Rfl:   •  albuterol (PROVENTIL HFA,VENTOLIN HFA) 90 mcg/act inhaler, Inhale 2 puffs every 6 (six) hours as needed for wheezing or shortness of breath, Disp: , Rfl:   •  atorvastatin (LIPITOR) 40 mg tablet, Take 40 mg by mouth daily after dinner Atorvastatin Calcium 40 MG Oral Tablet Take 1 tablet daily  Refills: 0  Active , Disp: , Rfl:   •  B Complex-C (SUPER B COMPLEX PO), Take 1 capsule by mouth daily , Disp: , Rfl:   •  benzonatate (TESSALON PERLES) 100 mg capsule, Take 1 capsule (100 mg total) by mouth 3 (three) times a day as needed for cough, Disp: 20 capsule, Rfl: 0  •  candesartan-hydrochlorothiazide (ATACAND HCT) 16-12 5 MG per tablet, Take 1 tablet by mouth daily  Indications: High Blood Pressure Disorder, Disp: , Rfl:   •  Cholecalciferol 25 MCG (1000 UT) capsule, Take 3,000 Units by mouth daily  Indications: Vitamin D Deficiency, Disp: , Rfl:   •  Diclofenac Sodium (VOLTAREN) 1 %, Apply 2 g topically 4 (four) times a day, Disp: , Rfl:   •  fluticasone (FLONASE) 50 mcg/act nasal spray, 2 sprays into each nostril daily as needed Shake liquid and spray, Disp: , Rfl:   •  gabapentin (NEURONTIN) 100 mg capsule, Take 100 mg by mouth daily, Disp: , Rfl:   •  Iron Combinations (NIFEREX) TABS, Take 1 tablet by mouth in the morning, Disp: , Rfl: 5  •  lubiprostone (AMITIZA) 24 mcg capsule, Take 24 mcg by mouth 2 (two) times a day with meals  Indications: Chronic Constipation of Unknown Cause, Disp: , Rfl:   •  metFORMIN (GLUCOPHAGE) 1000 MG tablet, Take 1,000 mg by mouth daily with dinner  Indications: Type 2 Diabetes, Disp: , Rfl:   •  metolazone (ZAROXOLYN) 2 5 mg tablet, Take 1 tablet (2 5 mg total) by mouth 2 (two) times a week Take on Saturday AM and Thursday AM 30 minutes prior to AM torsemide  , Disp: 30 tablet, Rfl: 5  •  Multiple Vitamin (MULTIVITAMINS PO), Take 1 tablet by mouth daily, Disp: , Rfl:   •  omeprazole (PriLOSEC) 20 mg delayed release capsule, Omeprazole 20 MG Oral Tablet Delayed Release Take 1 tablet daily  Refills: 0  Active, Disp: , Rfl:   •  polyethylene glycol (GLYCOLAX) 17 GM/SCOOP powder, Take 17 g by mouth 2 (two) times a day, Disp: , Rfl:   •  Potassium Chloride ER 20 MEQ TBCR, Take 20 mEq by mouth in the morning  Indications: Low Amount of Potassium in the Blood, Disp: , Rfl:   •  senna-docusate sodium (SENOKOT S) 8 6-50 mg per tablet, Take 1 tablet by mouth daily at bedtime, Disp: 30 tablet, Rfl: 0  •  sodium chloride, PF, 0 9 %, 10 mL by Intracatheter route daily Intracatheter flushing daily   May substitute prefilled syringe with normal saline 10 mL vials, 10 mL syringes, and 18 g blunt needles (Patient taking differently: 10 mL by Intracatheter route every 12 (twelve) hours Intracatheter flushing daily  May substitute prefilled syringe with normal saline 10 mL vials, 10 mL syringes, and 18 g blunt needles), Disp: 300 mL, Rfl: 2  •  sodium chloride, PF, 0 9 %, 10 mL by Intracatheter route daily for 120 doses Intracatheter flushing daily  May substitute prefilled syringe with normal saline 10 mL vials, 10 mL syringes, and 18 g blunt needles, Disp: 300 mL, Rfl: 3  •  sodium hypochlorite (DAKIN'S HALF-STRENGTH) external solution, Apply 1 application  topically daily At each dressing change, Disp: 473 mL, Rfl: 0  •  sotalol (BETAPACE) 80 mg tablet, Take 1 tablet (80 mg total) by mouth daily, Disp: , Rfl: 0  •  torsemide (DEMADEX) 20 mg tablet, Take 20 mg by mouth daily and 10 mg in PM, Disp: , Rfl:   •  warfarin (COUMADIN) 2 mg tablet, Take 2 tablets (4 mg total) by mouth daily Acknowledge (Patient taking differently: Take 4 mg by mouth daily Pt taking 6mg Tue/Thur/Sat and 4 mg M/W/F/Sun), Disp: 14 tablet, Rfl: 0  •  allopurinol (ZYLOPRIM) 100 mg tablet, Take 1 tablet (100 mg total) by mouth daily Do not start before November 15, 2022  (Patient not taking: Reported on 4/8/2023), Disp: , Rfl:   •  mirtazapine (REMERON) 7 5 MG tablet, Take 15 mg by mouth daily at bedtime ordered by Dr Severa Bright (Patient not taking: Reported on 5/24/2023), Disp: , Rfl:   No current facility-administered medications for this visit      Facility-Administered Medications Ordered in Other Visits:   •  cefTRIAXone (ROCEPHIN) 2,000 mg in dextrose 5 % 50 mL IVPB, 2,000 mg, Intravenous, Once, Joshua Cardozo MD  •  sodium chloride 0 9 % infusion, 75 mL/hr, Intravenous, Continuous, Joshua Cardozo MD, Last Rate: 75 mL/hr at 03/08/23 1000, 75 mL/hr at 03/08/23 1000      Wt Readings from Last 3 Encounters:   05/19/23 (!) 147 kg (324 lb 1 2 oz)   04/27/23 (!) 147 kg (323 lb)   04/08/23 (!) 142 kg (312 lb)     Temp Readings from Last 3 Encounters:   23 (!) 96 8 °F (36 °C)   23 (P) 97 9 °F (36 6 °C)   23 97 9 °F (36 6 °C)     BP Readings from Last 3 Encounters:   23 98/58   23 111/56   23 (P) 90/60     Pulse Readings from Last 3 Encounters:   23 62   23 69   23 (P) 66         Physical Exam  HENT:      Head: Atraumatic  Mouth/Throat:      Mouth: Mucous membranes are moist    Eyes:      Extraocular Movements: Extraocular movements intact  Cardiovascular:      Rate and Rhythm: Normal rate and regular rhythm  Heart sounds: Normal heart sounds  Pulmonary:      Effort: Pulmonary effort is normal       Breath sounds: Normal breath sounds  Abdominal:      General: Abdomen is flat  Musculoskeletal:         General: Normal range of motion  Cervical back: Normal range of motion  Skin:     General: Skin is warm  Neurological:      General: No focal deficit present  Mental Status: He is alert and oriented to person, place, and time     Psychiatric:         Mood and Affect: Mood normal          Behavior: Behavior normal            Laboratory Studies:  CMP:  Lab Results   Component Value Date    ALT 13 2023    ANIONGAP 8 10/10/2015    AST 22 2023    BILITOT 2 23 (H) 10/10/2015    BUN 45 (H) 2023     2023    CO2 25 2023    CREATININE 2 16 (H) 2023    EGFR 26 2023    GLUCOSE 149 (H) 2023    K 5 0 2023     10/10/2015         Cardiac testing:   EKG reviewed personally: Lon 62  Results for orders placed during the hospital encounter of 20    Echo complete with contrast if indicated    Narrative  [address]  [phone]    Transthoracic Echocardiogram  2D, M-mode, Doppler, and Color Doppler    Study date:  2020    Patient: Mynor Smith  MR number: [id number]  Account number: [de-identified]  : [id number]  Age: 81 years  Gender: Male  Status: Outpatient  [facility] [address] [place] [address] [facility]  Height: 73 in  Weight: 351 3 lb  BP: 152/ 73 mmHg    Indications: PAF; Acute on chronic diastolic HF;s/p AVR  Diagnoses: I35 9 - Nonrheumatic aortic valve disorder, unspecified, I48 0 - Atrial fibrillation, I50 9 - Heart failure, unspecified    Sonographer:  ABBY Rausch  Interpreting Physician:  Hanna Jernigan MD  Primary Physician:  Rosette Bermeo DO  Referring Physician:  Jenna Guillory MD  Group:  [id number]  Cardiology Fellow:  Ramu Burgess DO    SUMMARY    PROCEDURE INFORMATION:  This was a technically difficult study  Echocardiographic views were limited due to restricted patient mobility, poor patient compliance, poor acoustic window availability, decreased penetration, and lung interference  LEFT VENTRICLE:  Systolic function was normal  Ejection fraction was estimated to be 55 %  Although no diagnostic regional wall motion abnormality was identified, this possibility cannot be completely excluded on the basis of this study  Wall thickness was mildly to moderately increased  There was mild concentric hypertrophy  RIGHT VENTRICLE:  The ventricle was mildly dilated  Wall thickness was mildly increased  LEFT ATRIUM:  The atrium was mildly to moderately dilated  RIGHT ATRIUM:  The atrium was mildly to moderately dilated  MITRAL VALVE:  There was mild to moderate annular calcification  There was mild regurgitation  AORTIC VALVE:  A bioprosthesis was present  It exhibited normal function  The peak valve velocity was 197 cm/s  The mean valve velocity was 152 cm/s  Valve peak gradient was 16 mmHg  Valve mean gradient was 9 mmHg  Estimated aortic valve area (by VTI) was 2 16 cmï¾²  TRICUSPID VALVE:  There was mild regurgitation  Pulmonary artery systolic pressure was mildly increased  Estimated peak PA pressure was 40 mmHg      PULMONIC VALVE:  There was mild regurgitation  HISTORY: PRIOR HISTORY: HTN;s/p ICD;VT;SOB; Morbid obesity  PROCEDURE: The study was performed in the [address]  This was a routine study  The transthoracic approach was used  The study included complete 2D imaging, M-mode, complete spectral Doppler, and color Doppler  The  heart rate was 67 bpm, at the start of the study  Images were obtained from the parasternal, apical, subcostal, and suprasternal notch acoustic windows  Echocardiographic views were limited due to restricted patient mobility, poor patient  compliance, poor acoustic window availability, decreased penetration, and lung interference  This was a technically difficult study  LEFT VENTRICLE: Size was normal  Systolic function was normal  Ejection fraction was estimated to be 55 %  Although no diagnostic regional wall motion abnormality was identified, this possibility cannot be completely excluded on the basis  of this study  Wall thickness was mildly to moderately increased  There was mild concentric hypertrophy  RIGHT VENTRICLE: The ventricle was mildly dilated  Systolic function was low normal  Wall thickness was mildly increased  A pacing wire was present in the ventricular cavity  LEFT ATRIUM: The atrium was mildly to moderately dilated  RIGHT ATRIUM: The atrium was mildly to moderately dilated  MITRAL VALVE: There was mild to moderate annular calcification  There was normal leaflet separation  DOPPLER: The transmitral velocity was within the normal range  There was no evidence for stenosis  There was mild regurgitation  AORTIC VALVE: A bioprosthesis was present  It exhibited normal function  TRICUSPID VALVE: The valve structure was normal  There was normal leaflet separation  DOPPLER: The transtricuspid velocity was within the normal range  There was no evidence for stenosis  There was mild regurgitation  Pulmonary artery  systolic pressure was mildly increased   Estimated peak PA pressure was 40 mmHg  PULMONIC VALVE: Leaflets exhibited normal thickness, no calcification, and normal cuspal separation  DOPPLER: The transpulmonic velocity was within the normal range  There was mild regurgitation  PERICARDIUM: There was no pericardial effusion  AORTA: The root exhibited normal size  SYSTEMIC VEINS: IVC: The inferior vena cava was not well visualized  MEASUREMENT TABLES    2D MEASUREMENTS  LVOT   (Reference normals)  Diam   23 mm   (--)    DOPPLER MEASUREMENTS  LVOT   (Reference normals)  Peak annie   101 cm/s   (--)  Mean annie   70 cm/s   (--)  VTI   23 cm   (--)  Peak gradient   4 mmHg   (--)  Mean gradient   2 2 mmHg   (--)  Stroke vol   95 56 ml   (--)  Aortic valve   (Reference normals)  Peak annie   197 cm/s   (--)  Mean annie   152 cm/s   (--)  VTI   44 cm   (--)  Peak gradient   16 mmHg   (--)  Mean gradient   9 mmHg   (--)  Obstr index, VTI   0 52    (--)  Valve area, VTI   2 16 cmï¾²   (--)  Area index, VTI   0 79 cmï¾²/mï¾²   (--)  Obstr index, Vmax   0 51    (--)  Valve area, Vmax   2 12 cmï¾²   (--)  Area index, Vmax   0 77 cmï¾²/mï¾²   (--)  Obstr index, Vmean   0 46    (--)  Valve area, Vmean   1 91 cmï¾²   (--)  Area index, Vmean   0 7 cmï¾²/mï¾²   (--)    SYSTEM MEASUREMENT TABLES    2D  %FS: 22 82 %  Ao Diam: 2 49 cm  EDV(Teich): 57 38 ml  EF(Teich): 46 66 %  ESV(Teich): 30 61 ml  IVSd: 1 96 cm  LA Area: 26 79 cm2  LA Diam: 4 97 cm  LVEDV MOD A4C: 112 1 ml  LVEF MOD A4C: 50 26 %  LVESV MOD A4C: 55 76 ml  LVIDd: 3 68 cm  LVIDs: 2 84 cm  LVLd A4C: 8 11 cm  LVLs A4C: 6 99 cm  LVOT Diam: 2 25 cm  LVPWd: 1 98 cm  RA Area: 24 17 cm2  RVIDd: 4 17 cm  SV MOD A4C: 56 34 ml  SV(Teich): 26 78 ml    CW  AV Env  Ti: 296 86 ms  AV VTI: 39 2 cm  AV Vmax: 2 1 m/s  AV Vmean: 1 32 m/s  AV maxP 68 mmHg  AV meanP 71 mmHg  TR Vmax: 2 85 m/s  TR maxP 45 mmHg    MM  TAPSE: 1 69 cm    PW  KISHA (VTI): 2 34 cm2  KISHA Vmax: 1 91 cm2  AVAI (VTI): 0 cm2/m2  AVAI Vmax: 0 cm2/m2  E' Sept: 0 06 m/s  E/E' Sept: 19 55  LVOT Env  Ti: 327 31 ms  LVOT VTI: 22 97 cm  LVOT Vmax: 1 01 m/s  LVOT Vmean: 0 7 m/s  LVOT maxP 04 mmHg  LVOT meanP 24 mmHg  LVSI Dopp: 33 45 ml/m2  LVSV Dopp: 91 66 ml  MV A Juan: 1 43 m/s  MV Dec Hayes: 5 21 m/s2  MV DecT: 213 91 ms  MV E Juan: 1 11 m/s  MV E/A Ratio: 0 78  MV PHT: 62 03 ms  MVA By PHT: 3 55 cm2    [facility] Accredited [facility]chocardi[facility]    Prepared and electronically signed by    Itzel Valerio MD  Signed  10:03:59    No results found for this or any previous visit  No results found for this or any previous visit  No results found for this or any previous visit

## 2023-05-24 NOTE — ASSESSMENT & PLAN NOTE
Wt Readings from Last 3 Encounters:   05/19/23 (!) 147 kg (324 lb 1 2 oz)   04/27/23 (!) 147 kg (323 lb)   04/08/23 (!) 142 kg (312 lb)      On Torsemide 20 mg in the morning and 10 mg evening, Metolazone Saturday and Thursday  Patient remains in room air, not hypoxic  Noted his weight is uptrending  Due to REMA, hypotension, holding IV diuretics     Continue home diuretics  Consult cardiology for recommendation  Daily weight, intake/output

## 2023-05-24 NOTE — H&P
[address] [facility]  H&P  Name: Shashi Pratt 81 y o  male I MRN: [id number]  Unit/Bed#: ED 6 I Date of Admission: 5/24/2023   Date of Service: 5/24/2023 I Hospital Day: 0      Assessment/Plan   * Acute kidney injury [facility])  Assessment & Plan  Baseline creatinine appeast 1 3-1 4  Presented with Creatine 2 06  Hold Candesartan-HCTZ  Likely multifactorial due to episodes of hypotension, anemia, use of ARB  Due to concern for volume overload, hold any IVF  Trend renal fx      Dark stools  Assessment & Plan  Dark stool started on 5/19/23  Noted worsening anemia  Hold Amitiza  Hold coumadin      Chronic deep vein thrombosis (DVT) ([facility])  Assessment & Plan  On coumadin, will hold for above    OBINNA (obstructive sleep apnea)  Assessment & Plan  Reports does not use CPAP at home    Chronic diastolic CHF (congestive heart failure) ([facility])  Assessment & Plan  Wt Readings from Last 3 Encounters:   05/19/23 (!) 147 kg (324 lb 1 2 oz)   04/27/23 (!) 147 kg (323 lb)   04/08/23 (!) 142 kg (312 lb)      On Torsemide 20 mg in the morning and 10 mg evening, Metolazone Saturday and Thursday  Patient remains in room air, not hypoxic  Noted his weight is uptrending  Due to REMA, hypotension, holding IV diuretics  Continue home diuretics  Consult cardiology for recommendation  Daily weight, intake/output        Venous ulcer of ankle, right ([facility] [address] )  Assessment & Plan  Wound care consult    Presence of implantable cardioverter-defibrillator (ICD)  Assessment & Plan  s/p ICD for ventricular tachycardia    S/P AVR  Assessment & Plan  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    Paroxysmal atrial fibrillation ([facility] [address] )  Assessment & Plan  Currently on Sotalol  Anticoagulated on coumadin, on hold    Anemia  Assessment & Plan  Baseline hgb appears to be 8-10  Today hemoglobin 7 5 in the setting of dark stool    Hold coumadin  Trend h/h  Transfuse <7    Type 2 diabetes mellitus with diabetic neuropathy, without "long-term current use of insulin (HCC)  Assessment & Plan  Lab Results   Component Value Date    HGBA1C 6 3 (H) 03/01/2022       No results for input(s): \"POCGLU\" in the last 72 hours  Blood Sugar Average: Last 72 hrs:  well controlled  Hold metformin  Sliding scale         VTE Prophylaxis: Pharmacologic VTE Prophylaxis contraindicated due to INR therepeutic, hold coumadin due to concern for GI bleed  / sequential compression device   Code Status: full code     Discussion with family: daughter and wife present at bedside    Anticipated Length of Stay:  Patient will be admitted on an Inpatient basis with an anticipated length of stay of  > 2 midnights  Justification for Hospital Stay: REMA,     Total Time for Visit, including Counseling / Coordination of Care: 60 minutes  Greater than 50% of this total time spent on direct patient counseling and coordination of care  Chief Complaint:   Fatigue, dyspnea    History of Present Illness:    Soham Koo is a 81 y o  male with significant PMH of CHF, ICD in place, Afib, CKD, chronic DVT on coumadin, s/p bioprosthetic AVR, sent to ED from Cardiologist office due to worsening fatigue, SOB with exertion, dark stool  Patient seen in ED 5 days ago for same and noted to have REMA, and anemia  Patient was advised to hold coumadin for two days and was given small IVF bolus  Today he continues to have same complains  In ED, troponin negative  Creatinine remains elevated above baseline  hgb 7 5 and trending down  INR 2 95  he reports no further dark stool  Elevated proBNP noted  CXR reveals venous congestions and likely persistent effusion  Admission requested for REMA, anemia, volume overload  Fortunately patient remains in room air  No hypoxia noted  Review of Systems:    Review of Systems   Constitutional: Positive for fatigue  Negative for chills and fever  HENT: Negative for ear pain and sore throat  Eyes: Negative for pain and visual disturbance   " Respiratory: Positive for shortness of breath  Negative for cough  Cardiovascular: Positive for leg swelling  Negative for chest pain and palpitations  Gastrointestinal: Negative for abdominal pain and vomiting  Genitourinary: Negative for dysuria and hematuria  Musculoskeletal: Negative for arthralgias and back pain  Skin: Negative for color change and rash  Neurological: Negative for seizures and syncope  All other systems reviewed and are negative        Past Medical and Surgical History:     Past Medical History:   Diagnosis Date   • Anemia    • Arthritis    • Atrial fibrillation ([facility] [address] )    • Basal cell carcinoma 03/22/2022    Tip of Nose   • CHF (congestive heart failure) (HCC)    • Diabetes mellitus (HCC)     Niddm   • DVT (deep vein thrombosis) in pregnancy 1966    not in pregnancy   • DVT (deep venous thrombosis) ([facility] [address] ) 1966   • Dyslipidemia    • Encephalopathy acute 11/4/2022   • GERD (gastroesophageal reflux disease)    • Hyperlipidemia    • Hypertension    • Hypomagnesemia 10/19/2022   • Irregular heart beat     Afib   • Morbid obesity due to excess calories ([facility] [address] )     Resolved 9/2/2014    • Pulmonary embolism (HCC)    • Sepsis ([facility] )    • Squamous cell skin cancer 07/30/2020    Left posterior scalp   • Visual impairment        Past Surgical History:   Procedure Laterality Date   • AORTIC VALVE REPLACEMENT     • CARDIAC DEFIBRILLATOR PLACEMENT  04/2014   • CARDIAC SURGERY  02/2014    AVR   • COLONOSCOPY     • INSERT / REPLACE / REMOVE PACEMAKER     • IR ASPIRATION BAKERS CYST  3/8/2023   • IR CHOLECYSTOSTOMY TUBE CHECK/CHANGE/REPOSITION/REINSERTION/UPSIZE  10/13/2022   • IR CHOLECYSTOSTOMY TUBE CHECK/CHANGE/REPOSITION/REINSERTION/UPSIZE  10/19/2022   • IR CHOLECYSTOSTOMY TUBE CHECK/CHANGE/REPOSITION/REINSERTION/UPSIZE  10/27/2022   • IR CHOLECYSTOSTOMY TUBE CHECK/CHANGE/REPOSITION/REINSERTION/UPSIZE  11/7/2022   • IR CHOLECYSTOSTOMY TUBE CHECK/CHANGE/REPOSITION/REINSERTION/UPSIZE  11/10/2022 • IR CHOLECYSTOSTOMY TUBE CHECK/CHANGE/REPOSITION/REINSERTION/UPSIZE  12/1/2022   • IR CHOLECYSTOSTOMY TUBE CHECK/CHANGE/REPOSITION/REINSERTION/UPSIZE  1/6/2023   • IR CHOLECYSTOSTOMY TUBE CHECK/CHANGE/REPOSITION/REINSERTION/UPSIZE  1/24/2023   • IR CHOLECYSTOSTOMY TUBE CHECK/CHANGE/REPOSITION/REINSERTION/UPSIZE  3/15/2023   • IR CHOLECYSTOSTOMY TUBE PLACEMENT  9/1/2022   • IR DRAINAGE TUBE CHECK AND/OR REMOVAL  3/22/2023   • IR DRAINAGE TUBE CHECK AND/OR REMOVAL  4/10/2023   • IR DRAINAGE TUBE PLACEMENT  4/6/2023   • JOINT REPLACEMENT Left     LTKR   • MOHS SURGERY  07/30/2020    Left posterior scalp, Dr Carolina Paige   • MOHS SURGERY  04/18/2022    BCC Tip of Nose- Dr Carolian Paige   • Delaney Ervin DIVJ&STRIP LONG SAPH [place] Right 8/17/2018    Procedure: LEG PERFORATED INJECTION SCLEROTHERAPY;  Surgeon: Julianna Calderon MD;  Location: [facility]  MAIN OR;  Service: Vascular   • REPLACEMENT TOTAL KNEE Right    • TOTAL KNEE ARTHROPLASTY Left    • VASCULAR SURGERY     • VENA CAVA FILTER PLACEMENT      Interruption inferior vena cava, [place] filter, placement   • WISDOM TOOTH EXTRACTION         Meds/Allergies:    Prior to Admission medications    Medication Sig Start Date End Date Taking? Authorizing Provider   Acetaminophen (TYLENOL EXTRA STRENGTH PO) Take 500 mg by mouth if needed (for pain as needed)  Indications: pain as needed    Historical Provider, MD   albuterol (PROVENTIL HFA,VENTOLIN HFA) 90 mcg/act inhaler Inhale 2 puffs every 6 (six) hours as needed for wheezing or shortness of breath 3/4/21   Historical Provider, MD   allopurinol (ZYLOPRIM) 100 mg tablet Take 1 tablet (100 mg total) by mouth daily Do not start before November 15, 2022    Patient not taking: Reported on 4/8/2023 11/15/22 2/13/23  Cecilia Pollock MD   atorvastatin (LIPITOR) 40 mg tablet Take 40 mg by mouth daily after dinner Atorvastatin Calcium 40 MG Oral Tablet Take 1 tablet daily  Refills: 0  Active     Historical Provider, MD B Complex-C (SUPER B COMPLEX PO) Take 1 capsule by mouth daily     Historical Provider, MD   benzonatate (TESSALON PERLES) 100 mg capsule Take 1 capsule (100 mg total) by mouth 3 (three) times a day as needed for cough 4/7/23   Otilia Flores PA-C   candesartan-hydrochlorothiazide (ATACAND HCT) 16-12 5 MG per tablet Take 1 tablet by mouth daily  Indications: High Blood Pressure Disorder    Shobha Chambers DO   Cholecalciferol 25 MCG (1000 UT) capsule Take 3,000 Units by mouth daily  Indications: Vitamin D Deficiency    Shobha Chambers DO   Diclofenac Sodium (VOLTAREN) 1 % Apply 2 g topically 4 (four) times a day 11/14/22   Cedric Raya MD   fluticasone [facility]) 50 mcg/act nasal spray 2 sprays into each nostril daily as needed Shake liquid and spray 7/7/21   Historical Provider, MD   gabapentin (NEURONTIN) 100 mg capsule Take 100 mg by mouth daily 1/25/22   Historical Provider, MD   Iron Combinations (NIFEREX) TABS Take 1 tablet by mouth in the morning 2/13/18   Historical Provider, MD   lubiprostone (AMITIZA) 24 mcg capsule Take 24 mcg by mouth 2 (two) times a day with meals  Indications: Chronic Constipation of Unknown Cause    Shobha Chambers DO   metFORMIN (GLUCOPHAGE) 1000 MG tablet Take 1,000 mg by mouth daily with dinner  Indications: Type 2 Diabetes    Historical Provider, MD   metolazone (ZAROXOLYN) 2 5 mg tablet Take 1 tablet (2 5 mg total) by mouth 2 (two) times a week Take on Saturday AM and Thursday AM 30 minutes prior to AM torsemide   4/27/23   Paulo العراقي MD   mirtazapine (REMERON) 7 5 MG tablet Take 15 mg by mouth daily at bedtime ordered by Dr Shobha Chambers  Patient not taking: Reported on 5/24/2023 11/30/22   Historical Provider, MD   Multiple Vitamin (MULTIVITAMINS PO) Take 1 tablet by mouth daily    Historical Provider, MD   omeprazole (PriLOSEC) 20 mg delayed release capsule Omeprazole 20 MG Oral Tablet Delayed Release Take 1 tablet daily  Refills: 0  Active    Historical Provider, MD   polyethylene glycol (GLYCOLAX) 17 GM/SCOOP powder Take 17 g by mouth 2 (two) times a day    Historical Provider, MD   Potassium Chloride ER 20 MEQ TBCR Take 20 mEq by mouth in the morning  Indications: Low Amount of Potassium in the Blood 4/8/23   Monserrat Lopez DO   senna-docusate sodium (SENOKOT S) 8 6-50 mg per tablet Take 1 tablet by mouth daily at bedtime 4/7/23   Reggie Rao PA-C   sodium chloride, PF, 0 9 % 10 mL by Intracatheter route daily Intracatheter flushing daily  May substitute prefilled syringe with normal saline 10 mL vials, 10 mL syringes, and 18 g blunt needles  Patient taking differently: 10 mL by Intracatheter route every 12 (twelve) hours Intracatheter flushing daily  May substitute prefilled syringe with normal saline 10 mL vials, 10 mL syringes, and 18 g blunt needles 12/1/22 5/24/23  ODESSA Leal   sodium chloride, PF, 0 9 % 10 mL by Intracatheter route daily for 120 doses Intracatheter flushing daily  May substitute prefilled syringe with normal saline 10 mL vials, 10 mL syringes, and 18 g blunt needles 4/6/23 8/4/23  Flory Fortune MD   sodium hypochlorite (DAKIN'S HALF-STRENGTH) external solution Apply 1 application  topically daily At each dressing change 5/9/23   Jodi Bustamante DO   sotalol (BETAPACE) 80 mg tablet Take 1 tablet (80 mg total) by mouth daily 10/24/22   Kendal Rubinstein, MD   torsemide [facility]) 20 mg tablet Take 20 mg by mouth daily and 10 mg in PM    Historical Provider, MD   warfarin (COUMADIN) 2 mg tablet Take 2 tablets (4 mg total) by mouth daily Acknowledge  Patient taking differently: Take 4 mg by mouth daily Pt taking 6mg Tue/Thur/Sat and 4 mg M/W/F/Sun 10/24/22   Kendal Rubinstein, MD     I have reviewed home medications using allscripts  Allergies:    Allergies   Allergen Reactions   • Tramadol Other (See Comments)     intolerance       Social History:     Marital Status: /Civil Union Substance Use History:   Social History     Substance and Sexual Activity   Alcohol Use Not Currently    Comment: no alcohol in 28 yrs     Social History     Tobacco Use   Smoking Status Never   Smokeless Tobacco Never     Social History     Substance and Sexual Activity   Drug Use Never       Family History:    Family History   Problem Relation Age of Onset   • Arthritis Mother    • Stroke Mother    • Arthritis Father    • Cancer Father    • Arthritis Daughter        Physical Exam:     Vitals:   Blood Pressure: 106/57 (05/24/23 1745)  Pulse: 72 (05/24/23 1745)  Temperature: 97 5 °F (36 4 °C) (05/24/23 1534)  Temp Source: Oral (05/24/23 1534)  Respirations: 20 (05/24/23 1745)  SpO2: 97 % (05/24/23 1745)    Physical Exam  Vitals and nursing note reviewed  Constitutional:       General: He is not in acute distress  Appearance: He is well-developed  He is ill-appearing  HENT:      Head: Normocephalic and atraumatic  Eyes:      Conjunctiva/sclera: Conjunctivae normal    Cardiovascular:      Rate and Rhythm: Normal rate and regular rhythm  Heart sounds: No murmur heard  Pulmonary:      Effort: Pulmonary effort is normal  No respiratory distress  Breath sounds: Rales present  Comments: Remains in room air, no hypoxia  Mild rales/rhonchi on left base  Abdominal:      Palpations: Abdomen is soft  Tenderness: There is no abdominal tenderness  Comments: Reports on mild discomfort mid abdomen  No guarding, no rigidity   Musculoskeletal:         General: Swelling present  Cervical back: Neck supple  Right lower leg: Edema present  Left lower leg: Edema present  Comments: Bilateral lower extremity swelling secondary to venous stasis skin changes, varicose vein, upto thigh  left medial ankle open wound which is chronic noted  As per wife, redness is same as usual  Not worsening  Skin:     General: Skin is warm and dry        Capillary Refill: Capillary refill takes less than 2 seconds  Neurological:      Mental Status: He is alert  Psychiatric:         Mood and Affect: Mood normal               Additional Data:     Lab Results: I have personally reviewed pertinent reports  Results from last 7 days   Lab Units 05/24/23  1609   EOS PCT % 3   HEMATOCRIT % 24 0*   HEMOGLOBIN g/dL 7 5*   LYMPHS PCT % 17   MONOS PCT % 11   NEUTROS PCT % 67   PLATELETS Thousands/uL 256   WBC Thousand/uL 5 75     Results from last 7 days   Lab Units 05/24/23  1609   ANION GAP mmol/L 4   ALBUMIN g/dL 2 7*   ALK PHOS U/L 103   ALT U/L 21   AST U/L 34   BUN mg/dL 45*   CALCIUM mg/dL 8 7   CHLORIDE mmol/L 107   CO2 mmol/L 24   CREATININE mg/dL 2 06*   GLUCOSE RANDOM mg/dL 110   POTASSIUM mmol/L 4 3   SODIUM mmol/L 135   TOTAL BILIRUBIN mg/dL 1 53*     Results from last 7 days   Lab Units 05/24/23  1609   INR  2 95*                   Imaging: I have personally reviewed pertinent reports  XR chest 1 view portable    (Results Pending)       EKG, Pathology, and Other Studies Reviewed on Admission:   · EKG: reviewed    [facility] Yes     ** Please Note: This note has been constructed using a voice recognition system   **

## 2023-05-24 NOTE — ED PROVIDER NOTES
History  Chief Complaint   Patient presents with   • Shortness of Breath     Pt c/o SOB worsening with exertion  Multiple comorbidities, pt referred to ED directly from cardiologist office for readmission, pt recently discharged  79-year-old male with history of CHF, A-fib, PE on warfarin, hypertension, hyperlipidemia, obesity, presents emergency department due to progressive dyspnea  Patient had been noticing increasing difficulty ambulating throughout his apartment over the past few weeks and in the past week this has become significantly more debilitating  He is also noticed weight gain, increased lower extremity swelling, some intermittent chest discomfort that does not radiate and is not remain in place for more than a few minutes at a time  Patient family notes that he is not safe to continue caring for self at home with symptoms like this because he requires significant assistance  Patient's been compliant with meds  No other complaints at this time  Prior to Admission Medications   Prescriptions Last Dose Informant Patient Reported? Taking? Acetaminophen (TYLENOL EXTRA STRENGTH PO) Unknown Self Yes No   Sig: Take 500 mg by mouth if needed (for pain as needed)  Indications: pain as needed   B Complex-C (SUPER B COMPLEX PO) 5/24/2023 Self Yes Yes   Sig: Take 1 capsule by mouth daily    Cholecalciferol 25 MCG (1000 UT) capsule Unknown Self Yes No   Sig: Take 3,000 Units by mouth daily  Indications: Vitamin D Deficiency   Diclofenac Sodium (VOLTAREN) 1 % Past Week Self No Yes   Sig: Apply 2 g topically 4 (four) times a day   Iron Combinations (NIFEREX) TABS 5/24/2023 Self Yes Yes   Sig: Take 1 tablet by mouth in the morning   Multiple Vitamin (MULTIVITAMINS PO) 5/24/2023 Self Yes Yes   Sig: Take 1 tablet by mouth daily   Potassium Chloride ER 20 MEQ TBCR 5/24/2023 Self Yes Yes   Sig: Take 20 mEq by mouth in the morning   Indications: Low Amount of Potassium in the Blood   albuterol (PROVENTIL HFA,VENTOLIN HFA) 90 mcg/act inhaler Not Taking Self Yes No   Sig: Inhale 2 puffs every 6 (six) hours as needed for wheezing or shortness of breath   Patient not taking: Reported on 2023   allopurinol (ZYLOPRIM) 100 mg tablet  Self No No   Sig: Take 1 tablet (100 mg total) by mouth daily Do not start before November 15, 2022  Patient not taking: Reported on 2023   atorvastatin (LIPITOR) 40 mg tablet Not Taking Self Yes No   Sig: Take 40 mg by mouth daily after dinner Atorvastatin Calcium 40 MG Oral Tablet Take 1 tablet daily  Refills: 0  Active    Patient not taking: Reported on 2023   benzonatate (TESSALON PERLES) 100 mg capsule 2023 Self No Yes   Sig: Take 1 capsule (100 mg total) by mouth 3 (three) times a day as needed for cough   candesartan-hydrochlorothiazide (ATACAND HCT) 16-12 5 MG per tablet 2023 Self Yes Yes   Sig: Take 1 tablet by mouth daily  Indications: High Blood Pressure Disorder   fluticasone (FLONASE) 50 mcg/act nasal spray Not Taking Self Yes No   Si sprays into each nostril daily as needed Shake liquid and spray   Patient not taking: Reported on 2023   gabapentin (NEURONTIN) 100 mg capsule 2023 Self Yes Yes   Sig: Take 100 mg by mouth daily   lubiprostone (AMITIZA) 24 mcg capsule 2023 Self Yes Yes   Sig: Take 24 mcg by mouth 2 (two) times a day with meals  Indications: Chronic Constipation of Unknown Cause   metFORMIN (GLUCOPHAGE) 1000 MG tablet  Self Yes No   Sig: Take 1,000 mg by mouth daily with dinner  Indications: Type 2 Diabetes   metolazone (ZAROXOLYN) 2 5 mg tablet 2023  No Yes   Sig: Take 1 tablet (2 5 mg total) by mouth 2 (two) times a week Take on Saturday AM and Thursday AM 30 minutes prior to AM torsemide     mirtazapine (REMERON) 7 5 MG tablet Not Taking Self Yes No   Sig: Take 15 mg by mouth daily at bedtime ordered by Dr Lazaro Pascual   Patient not taking: Reported on 2023   omeprazole (PriLOSEC) 20 mg delayed release capsule 5/24/2023 Self Yes Yes   Sig: Omeprazole 20 MG Oral Tablet Delayed Release Take 1 tablet daily  Refills: 0  Active   polyethylene glycol (GLYCOLAX) 17 GM/SCOOP powder 5/23/2023 Self Yes Yes   Sig: Take 17 g by mouth 2 (two) times a day   senna-docusate sodium (SENOKOT S) 8 6-50 mg per tablet  Self No No   Sig: Take 1 tablet by mouth daily at bedtime   sodium chloride, PF, 0 9 % 5/24/2023 Self No Yes   Sig: 10 mL by Intracatheter route daily Intracatheter flushing daily  May substitute prefilled syringe with normal saline 10 mL vials, 10 mL syringes, and 18 g blunt needles   Patient taking differently: 10 mL by Intracatheter route every 12 (twelve) hours Intracatheter flushing daily  May substitute prefilled syringe with normal saline 10 mL vials, 10 mL syringes, and 18 g blunt needles   sodium chloride, PF, 0 9 %  Self No No   Sig: 10 mL by Intracatheter route daily for 120 doses Intracatheter flushing daily  May substitute prefilled syringe with normal saline 10 mL vials, 10 mL syringes, and 18 g blunt needles   sodium hypochlorite (DAKIN'S HALF-STRENGTH) external solution   No No   Sig: Apply 1 application   topically daily At each dressing change   sotalol (BETAPACE) 80 mg tablet  Self No No   Sig: Take 1 tablet (80 mg total) by mouth daily   torsemide (DEMADEX) 20 mg tablet 5/24/2023 Self Yes Yes   Sig: Take 20 mg by mouth daily and 10 mg in PM   warfarin (COUMADIN) 2 mg tablet 5/24/2023 Self No Yes   Sig: Take 2 tablets (4 mg total) by mouth daily Acknowledge   Patient taking differently: Take 4 mg by mouth daily Pt taking 6mg Tue/Thur/Sat and 4 mg M/W/F/Sun      Facility-Administered Medications: None       Past Medical History:   Diagnosis Date   • Anemia    • Arthritis    • Atrial fibrillation (HCC)    • Basal cell carcinoma 03/22/2022    Tip of Nose   • CHF (congestive heart failure) (HCC)    • Diabetes mellitus (HCC)     Niddm   • DVT (deep vein thrombosis) in pregnancy 1966    not in pregnancy   • DVT (deep venous thrombosis) ([facility] ) 1966   • Dyslipidemia    • Encephalopathy acute 11/4/2022   • GERD (gastroesophageal reflux disease)    • Hyperlipidemia    • Hypertension    • Hypomagnesemia 10/19/2022   • Irregular heart beat     Afib   • Morbid obesity due to excess calories ([facility] )     Resolved 9/2/2014    • Pulmonary embolism (HCC)    • Sepsis ([facility] )    • Squamous cell skin cancer 07/30/2020    Left posterior scalp   • Visual impairment        Past Surgical History:   Procedure Laterality Date   • AORTIC VALVE REPLACEMENT     • CARDIAC DEFIBRILLATOR PLACEMENT  04/2014   • CARDIAC SURGERY  02/2014    AVR   • COLONOSCOPY     • INSERT / REPLACE / REMOVE PACEMAKER     • IR ASPIRATION BAKERS CYST  3/8/2023   • IR CHOLECYSTOSTOMY TUBE CHECK/CHANGE/REPOSITION/REINSERTION/UPSIZE  10/13/2022   • IR CHOLECYSTOSTOMY TUBE CHECK/CHANGE/REPOSITION/REINSERTION/UPSIZE  10/19/2022   • IR CHOLECYSTOSTOMY TUBE CHECK/CHANGE/REPOSITION/REINSERTION/UPSIZE  10/27/2022   • IR CHOLECYSTOSTOMY TUBE CHECK/CHANGE/REPOSITION/REINSERTION/UPSIZE  11/7/2022   • IR CHOLECYSTOSTOMY TUBE CHECK/CHANGE/REPOSITION/REINSERTION/UPSIZE  11/10/2022   • IR CHOLECYSTOSTOMY TUBE CHECK/CHANGE/REPOSITION/REINSERTION/UPSIZE  12/1/2022   • IR CHOLECYSTOSTOMY TUBE CHECK/CHANGE/REPOSITION/REINSERTION/UPSIZE  1/6/2023   • IR CHOLECYSTOSTOMY TUBE CHECK/CHANGE/REPOSITION/REINSERTION/UPSIZE  1/24/2023   • IR CHOLECYSTOSTOMY TUBE CHECK/CHANGE/REPOSITION/REINSERTION/UPSIZE  3/15/2023   • IR CHOLECYSTOSTOMY TUBE PLACEMENT  9/1/2022   • IR DRAINAGE TUBE CHECK AND/OR REMOVAL  3/22/2023   • IR DRAINAGE TUBE CHECK AND/OR REMOVAL  4/10/2023   • IR DRAINAGE TUBE PLACEMENT  4/6/2023   • JOINT REPLACEMENT Left     LTKR   • MOHS SURGERY  07/30/2020    Left posterior scalp, Dr Will Banegas   • MOHS SURGERY  04/18/2022    BCC Tip of Nose- Dr Will Banegas   • Torie Chock DIVJ&STRIP LONG SAPH Larence Mom Right 8/17/2018    Procedure: LEG PERFORATED INJECTION SCLEROTHERAPY;  Surgeon: Andrade Vincent MD;  Location: [facility];  Service: Vascular   • REPLACEMENT TOTAL KNEE Right    • TOTAL KNEE ARTHROPLASTY Left    • VASCULAR SURGERY     • VENA CAVA FILTER PLACEMENT      Interruption inferior vena cava, Josue filter, placement   • WISDOM TOOTH EXTRACTION         Family History   Problem Relation Age of Onset   • Arthritis Mother    • Stroke Mother    • Arthritis Father    • Cancer Father    • Arthritis Daughter      I have reviewed and agree with the history as documented  E-Cigarette/Vaping   • E-Cigarette Use Never User      E-Cigarette/Vaping Substances   • Nicotine No    • THC No    • CBD No    • Other No    • Unknown No      Social History     Tobacco Use   • Smoking status: Never   • Smokeless tobacco: Never   Vaping Use   • Vaping Use: Never used   Substance Use Topics   • Alcohol use: Not Currently     Comment: no alcohol in 28 yrs   • Drug use: Never        Review of Systems   All other systems reviewed and are negative  Physical Exam  ED Triage Vitals [05/24/23 1534]   Temperature Pulse Respirations Blood Pressure SpO2   97 5 °F (36 4 °C) 75 (!) 24 113/63 97 %      Temp Source Heart Rate Source Patient Position - Orthostatic VS BP Location FiO2 (%)   Oral Monitor Sitting Left arm --      Pain Score       No Pain             Orthostatic Vital Signs  Vitals:    05/26/23 1430 05/26/23 1445 05/26/23 1512 05/27/23 0701   BP: (!) 81/46 103/50 110/56 103/52   Pulse: 70 70 67 82   Patient Position - Orthostatic VS:           Physical Exam  Vitals and nursing note reviewed  Constitutional:       General: He is not in acute distress  Appearance: He is well-developed  He is obese  HENT:      Head: Normocephalic and atraumatic  Mouth/Throat:      Mouth: Mucous membranes are moist    Eyes:      Conjunctiva/sclera: Conjunctivae normal    Cardiovascular:      Rate and Rhythm: Normal rate and regular rhythm  Heart sounds: No murmur heard    Pulmonary:      Effort: Pulmonary effort is normal  No respiratory distress  Breath sounds: Normal breath sounds  Abdominal:      Palpations: Abdomen is soft  Tenderness: There is no abdominal tenderness  Musculoskeletal:         General: No swelling  Cervical back: Neck supple  Right lower leg: Edema present  Left lower leg: Edema present  Skin:     General: Skin is warm and dry  Capillary Refill: Capillary refill takes less than 2 seconds  Comments: Ulcerations to bilateral lower calves with minimal erythema  Right side roughly 2 x 4 cm  Left side less than 1 cm  Neurological:      Mental Status: He is alert     Psychiatric:         Mood and Affect: Mood normal          ED Medications  Medications   albuterol (PROVENTIL HFA,VENTOLIN HFA) inhaler 2 puff (has no administration in time range)   atorvastatin (LIPITOR) tablet 40 mg (40 mg Oral Given 5/26/23 1734)   gabapentin (NEURONTIN) capsule 100 mg (100 mg Oral Given 5/27/23 0856)   sotalol (BETAPACE) tablet 80 mg (80 mg Oral Not Given 5/27/23 0857)   pantoprazole (PROTONIX) EC tablet 40 mg ( Oral Canceled Entry 5/27/23 0600)   fluticasone (FLONASE) 50 mcg/act nasal spray 2 spray (has no administration in time range)   acetaminophen (TYLENOL) tablet 650 mg (has no administration in time range)   ondansetron (ZOFRAN) injection 4 mg (has no administration in time range)   insulin lispro (HumaLOG) 100 units/mL subcutaneous injection 1-6 Units ( Subcutaneous Not Given 5/27/23 0859)   insulin lispro (HumaLOG) 100 units/mL subcutaneous injection 1-5 Units ( Subcutaneous Not Given 5/26/23 2103)   lidocaine (LMX) 4 % cream ( Topical Not Given 5/26/23 0846)   sodium hypochlorite (DAKIN'S QUARTER-STRENGTH) 0 125 percent topical solution ( Irrigation Not Given 5/27/23 0856)   warfarin (COUMADIN) tablet 4 mg (4 mg Oral Given 5/26/23 2101)   warfarin (COUMADIN) tablet 6 mg (has no administration in time range)   bumetanide (BUMEX) injection 2 mg (2 mg Intravenous Given 5/27/23 0858)   potassium chloride (K-DUR,KLOR-CON) CR tablet 40 mEq (40 mEq Oral Given 5/27/23 0856)   magnesium sulfate 2 g/50 mL IVPB (premix) 2 g (2 g Intravenous New Bag 5/27/23 1040)   furosemide (LASIX) injection 40 mg (has no administration in time range)   iron sucrose (VENOFER) 200 mg in sodium chloride 0 9 % 100 mL IVPB (0 mg Intravenous Stopped 5/27/23 1104)   simethicone (MYLICON) 40 mg in sterile water 500 mL (40 mg Irrigation Given 5/26/23 1411)       Diagnostic Studies  Results Reviewed     Procedure Component Value Units Date/Time    TSH [[id number]]  (Abnormal) Collected: 05/24/23 1609    Lab Status: Final result Specimen: Blood from Arm, Right Updated: 05/25/23 0327     TSH 3RD GENERATON 5 513 uIU/mL     HS Troponin I 4hr [[id number]]  (Normal) Collected: 05/24/23 2015    Lab Status: Final result Specimen: Blood from Arm, Right Updated: 05/24/23 2121     hs TnI 4hr 11 ng/L      Delta 4hr hsTnI -1 ng/L     Urinalysis with microscopic [[id number]]  (Abnormal) Collected: 05/24/23 1945    Lab Status: Final result Specimen: Urine, Clean Catch Updated: 05/24/23 2012     Color, UA Colorless     Clarity, UA Clear     Specific Gravity, UA 1 009     pH, UA 5 0     Leukocytes, UA Negative     Nitrite, UA Negative     Protein, UA Negative mg/dl      Glucose, UA Negative mg/dl      Ketones, UA Negative mg/dl      Urobilinogen, UA <2 0 mg/dl      Bilirubin, UA Negative     Occult Blood, UA Trace     RBC, UA 4-10 /hpf      WBC, UA 1-2 /hpf      Epithelial Cells Occasional /hpf      Bacteria, UA None Seen /hpf      Granular Casts, UA 10-25    HS Troponin I 2hr [[id number]]  (Normal) Collected: 05/24/23 1825    Lab Status: Final result Specimen: Blood from Arm, Right Updated: 05/24/23 1905     hs TnI 2hr 11 ng/L      Delta 2hr hsTnI -1 ng/L     HS Troponin 0hr (reflex protocol) [[id number]]  (Normal) Collected: 05/24/23 1609    Lab Status: Final result Specimen: Blood from Arm, Right Updated: 05/24/23 1651     hs TnI 0hr 12 ng/L     B-Type Natriuretic Peptide(BNP) [[id number]]  (Abnormal) Collected: 05/24/23 1609    Lab Status: Final result Specimen: Blood from Arm, Right Updated: 05/24/23 1650      pg/mL     Comprehensive metabolic panel [[id number]]  (Abnormal) Collected: 05/24/23 1609    Lab Status: Final result Specimen: Blood from Arm, Right Updated: 05/24/23 1646     Sodium 135 mmol/L      Potassium 4 3 mmol/L      Chloride 107 mmol/L      CO2 24 mmol/L      ANION GAP 4 mmol/L      BUN 45 mg/dL      Creatinine 2 06 mg/dL      Glucose 110 mg/dL      Calcium 8 7 mg/dL      Corrected Calcium 9 7 mg/dL      AST 34 U/L      ALT 21 U/L      Alkaline Phosphatase 103 U/L      Total Protein 7 4 g/dL      Albumin 2 7 g/dL      Total Bilirubin 1 53 mg/dL      eGFR 28 ml/min/1 73sq m     Narrative:      [place] guidelines for Chronic Kidney Disease (CKD):   •  Stage 1 with normal or high GFR (GFR > 90 mL/min/1 73 square meters)  •  Stage 2 Mild CKD (GFR = 60-89 mL/min/1 73 square meters)  •  Stage 3A Moderate CKD (GFR = 45-59 mL/min/1 73 square meters)  •  Stage 3B Moderate CKD (GFR = 30-44 mL/min/1 73 square meters)  •  Stage 4 Severe CKD (GFR = 15-29 mL/min/1 73 square meters)  •  Stage 5 End Stage CKD (GFR <15 mL/min/1 73 square meters)  Note: GFR calculation is accurate only with a steady state creatinine    Magnesium [[id number]]  (Normal) Collected: 05/24/23 1609    Lab Status: Final result Specimen: Blood from Arm, Right Updated: 05/24/23 1646     Magnesium 2 0 mg/dL     Phosphorus [[id number]]  (Normal) Collected: 05/24/23 1609    Lab Status: Final result Specimen: Blood from Arm, Right Updated: 05/24/23 1646     Phosphorus 4 0 mg/dL     Protime-INR [[id number]]  (Abnormal) Collected: 05/24/23 1609    Lab Status: Final result Specimen: Blood from Arm, Right Updated: 05/24/23 1640     Protime 31 0 seconds      INR 2 95    APTT [[id number]]  (Abnormal) Collected: 05/24/23 1609    Lab Status: Final result Specimen: Blood from Arm, Right Updated: 05/24/23 1640     PTT 48 seconds     CBC and differential [[id number]]  (Abnormal) Collected: 05/24/23 1609    Lab Status: Final result Specimen: Blood from Arm, Right Updated: 05/24/23 1625     WBC 5 75 Thousand/uL      RBC 2 55 Million/uL      Hemoglobin 7 5 g/dL      Hematocrit 24 0 %      MCV 94 fL      MCH 29 4 pg      MCHC 31 3 g/dL      RDW 19 5 %      MPV 9 4 fL      Platelets 426 Thousands/uL      nRBC 0 /100 WBCs      Neutrophils Relative 67 %      Immat GRANS % 1 %      Lymphocytes Relative 17 %      Monocytes Relative 11 %      Eosinophils Relative 3 %      Basophils Relative 1 %      Neutrophils Absolute 3 88 Thousands/µL      Immature Grans Absolute 0 03 Thousand/uL      Lymphocytes Absolute 1 00 Thousands/µL      Monocytes Absolute 0 62 Thousand/µL      Eosinophils Absolute 0 18 Thousand/µL      Basophils Absolute 0 04 Thousands/µL                  XR chest 1 view portable   Final Result by Jerson Shah MD (05/25 2639)      Mild pulmonary vascular congestion and small bilateral pleural effusions                  Workstation performed: [id number]               Procedures  Procedures      ED Course             HEART Risk Score    Flowsheet Row Most Recent Value   Heart Score Risk Calculator    History 0 Filed at: 05/27/2023 1148   ECG 1 Filed at: 05/27/2023 1148   Age 2 Filed at: 05/27/2023 1148   Risk Factors 2 Filed at: 05/27/2023 1148   Troponin 0 Filed at: 05/27/2023 1148   HEART Score 5 Filed at: 05/27/2023 1148                      SBIRT 22yo+    Flowsheet Row Most Recent Value   Initial Alcohol Screen: US AUDIT-C     1  How often do you have a drink containing alcohol? 0 Filed at: 05/24/2023 1535   2  How many drinks containing alcohol do you have on a typical day you are drinking? 0 Filed at: 05/24/2023 1535   3a  Male UNDER 65: How often do you have five or more drinks on one occasion? 0 Filed at: 05/24/2023 1535   3b   FEMALE Any Age, or MALE 65+: How often do you have 4 or more drinks on one occassion? 0 Filed at: 05/24/2023 1535   Audit-C Score 0 Filed at: 05/24/2023 1535   KRISTIAN: How many times in the past year have you    Used an illegal drug or used a prescription medication for non-medical reasons? Never Filed at: 05/24/2023 Renard Sevilla                Medical Decision Making  71-year-old male presenting to emergency department due to progressive dyspnea  Differential includes CHF exacerbation, ACS, pneumonia, among multiple other possible etiologies  Will obtain cardiac labs, chest x-ray to evaluate  Work-up consistent with CHF exacerbation  EKG shows no concerning changes for acute MI  Patient admitted for further management  Amount and/or Complexity of Data Reviewed  Labs: ordered  Radiology: ordered  Risk  Decision regarding hospitalization  Disposition  Final diagnoses:   CHF (congestive heart failure) ([facility] )   REMA (acute kidney injury) ([place] [address] )     Time reflects when diagnosis was documented in both MDM as applicable and the Disposition within this note     Time User Action Codes Description Comment    5/24/2023  5:46 PM Kenyatta Saravia Add [I50 9] CHF (congestive heart failure) ([facility] )     5/24/2023  5:46 PM Guerrero Garcia Add [N17 9] REMA (acute kidney injury) ([place] [address] )     5/25/2023  9:19 AM Jb Escobar Add [R19 5] Dark stools     5/25/2023  9:19 AM Jb Dyke Add [D64 9] Acute on chronic anemia     5/25/2023  2:20 PM Sharad Ram Add [K92 1] Melena     5/25/2023  3:17 PM Ondina Han Add [I83 013,  M03 199] Venous ulcer of right ankle       ED Disposition     ED Disposition   Admit    Condition   Stable    Date/Time   Wed May 24, 2023  5:46 PM    Comment   Case was discussed with Dr Vipul Magana and the patient's admission status was agreed to be Admission Status: inpatient status to the service of Dr Homer Navas              Follow-up Information    None         Current Discharge Medication List CONTINUE these medications which have NOT CHANGED    Details   B Complex-C (SUPER B COMPLEX PO) Take 1 capsule by mouth daily       benzonatate (TESSALON PERLES) 100 mg capsule Take 1 capsule (100 mg total) by mouth 3 (three) times a day as needed for cough  Qty: 20 capsule, Refills: 0    Associated Diagnoses: COVID-19      candesartan-hydrochlorothiazide (ATACAND HCT) 16-12 5 MG per tablet Take 1 tablet by mouth daily  Indications: High Blood Pressure Disorder      Diclofenac Sodium (VOLTAREN) 1 % Apply 2 g topically 4 (four) times a day    Associated Diagnoses: Pain in both lower extremities      gabapentin (NEURONTIN) 100 mg capsule Take 100 mg by mouth daily      Iron Combinations (NIFEREX) TABS Take 1 tablet by mouth in the morning  Refills: 5      lubiprostone (AMITIZA) 24 mcg capsule Take 24 mcg by mouth 2 (two) times a day with meals  Indications: Chronic Constipation of Unknown Cause      metolazone (ZAROXOLYN) 2 5 mg tablet Take 1 tablet (2 5 mg total) by mouth 2 (two) times a week Take on Saturday AM and Thursday AM 30 minutes prior to AM torsemide  Qty: 30 tablet, Refills: 5    Associated Diagnoses: Chronic diastolic CHF (congestive heart failure) ([facility])      Multiple Vitamin (MULTIVITAMINS PO) Take 1 tablet by mouth daily      omeprazole (PriLOSEC) 20 mg delayed release capsule Omeprazole 20 MG Oral Tablet Delayed Release Take 1 tablet daily  Refills: 0  Active      polyethylene glycol (GLYCOLAX) 17 GM/SCOOP powder Take 17 g by mouth 2 (two) times a day      Potassium Chloride ER 20 MEQ TBCR Take 20 mEq by mouth in the morning   Indications: Low Amount of Potassium in the Blood      torsemide (DEMADEX) 20 mg tablet Take 20 mg by mouth daily and 10 mg in PM      warfarin (COUMADIN) 2 mg tablet Take 2 tablets (4 mg total) by mouth daily Acknowledge  Qty: 14 tablet, Refills: 0    Associated Diagnoses: PAF (paroxysmal atrial fibrillation) (HCC)      Acetaminophen (TYLENOL EXTRA STRENGTH PO) Take 500 mg by mouth if needed (for pain as needed)  Indications: pain as needed      albuterol (PROVENTIL HFA,VENTOLIN HFA) 90 mcg/act inhaler Inhale 2 puffs every 6 (six) hours as needed for wheezing or shortness of breath    Comments: Substitution to a formulary equivalent within the same pharmaceutical class is authorized  allopurinol (ZYLOPRIM) 100 mg tablet Take 1 tablet (100 mg total) by mouth daily Do not start before November 15, 2022  Associated Diagnoses: Gout, unspecified cause, unspecified chronicity, unspecified site      atorvastatin (LIPITOR) 40 mg tablet Take 40 mg by mouth daily after dinner Atorvastatin Calcium 40 MG Oral Tablet Take 1 tablet daily  Refills: 0  Active       Cholecalciferol 25 MCG (1000 UT) capsule Take 3,000 Units by mouth daily  Indications: Vitamin D Deficiency      fluticasone (FLONASE) 50 mcg/act nasal spray 2 sprays into each nostril daily as needed Shake liquid and spray      metFORMIN (GLUCOPHAGE) 1000 MG tablet Take 1,000 mg by mouth daily with dinner  Indications: Type 2 Diabetes      mirtazapine (REMERON) 7 5 MG tablet Take 15 mg by mouth daily at bedtime ordered by Dr Ciaran Uribe      senna-docusate sodium (SENOKOT S) 8 6-50 mg per tablet Take 1 tablet by mouth daily at bedtime  Qty: 30 tablet, Refills: 0    Associated Diagnoses: COVID-19      sodium chloride, PF, 0 9 % 10 mL by Intracatheter route daily for 120 doses Intracatheter flushing daily  May substitute prefilled syringe with normal saline 10 mL vials, 10 mL syringes, and 18 g blunt needles  Qty: 300 mL, Refills: 3    Associated Diagnoses: Abdominal fluid collection      sodium hypochlorite (DAKIN'S HALF-STRENGTH) external solution Apply 1 application   topically daily At each dressing change  Qty: 473 mL, Refills: 0    Associated Diagnoses: 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

## 2023-05-24 NOTE — ED ATTENDING ATTESTATION
"Pretty Saldaña MD, saw and evaluated the patient  I have discussed the patient with the resident and agree with the resident's findings, Plan of Care, and MDM as documented in the resident's note, except where noted  All available labs and Radiology studies were reviewed  I was present for key portions of any procedure(s) performed by the resident and I was immediately available to provide assistance  At this point I agree with the current assessment done in the Emergency Department  I have conducted an independent evaluation of this patient a history and physical is as follows:    82 yo morbidly obese male with a complicated past medical history including CHF, prior PE/DVT, CKD, paroxysmal atrial fibrillation on Coumadin, hyperlipidemia, gout, acute on chronic cholecystitis s/p recent percutaneous IR drain, DM, aortic valve replacement, indwelling ICD, and anemia sent to the ED from his cardiologist's office for admission to [facility]  The patient reports progressively worsening shortness of breath with exertion and generalize weakness/fatigue x several days  He is no longer able to ambulate independently secondary to the symptoms  No chest pain or cough  (+) Baseline LE swelling  No fevers or chills  The patient has been compliant with his daily medications  (+) \"Dark\" stools x \"a while now\"  No other specific complaints  ROS: per resident physician note    Gen: chronically ill appearing, AA&Ox3  HEENT: PERRL, EOMI  Neck: supple  CV: RRR  Lungs: CTA B/L  Abdomen: soft, NT/ND  Ext: (+) symmetric bilateral LE edema  Neuro: 5/5 strength all extremities, sensation grossly intact  Skin: no rash    ED Course  The patient is chronically ill appearing but with stable vital signs on arrival  Cardiology notes reviewed --> they are concerned about anemia, worsening renal function, and volume depletion  (+) Hypotension in the office earlier today  Will check EKG, CXR, basic labs, troponin, coags, and EMBU   " Disposition per workup and reassessment  Will continue to monitor in the ED  The patient will likely require admission to B        Critical Care Time  Procedures

## 2023-05-24 NOTE — PROGRESS NOTES
Results for orders placed or performed in visit on 05/24/23   Cardiac EP device report    Narrative    MDT DUAL CHAMBER ICD/ NOT MRI CONDITIONAL  DEVICE INTERROGATED IN THE [facility] OFFICE  BATTERY VOLTAGE ADEQUATE (1 9 YRS)  AP-5%, -0 1%  ALL LEAD PARAMETERS WITHIN NORMAL LIMITS  NO NEW SIGNIFICANT HIGH RATE EPISODES  WAVELET TEMPLATE COLLECTED  PT TO SEE DR RAMESH TODAY  NORMAL DEVICE FUNCTION   GV

## 2023-05-24 NOTE — ED PROCEDURE NOTE
Procedure  POC Cardiac US    Date/Time: 5/24/2023 4:35 PM    Performed by: Sai Biggs MD  Authorized by: Sai Biggs MD    Patient location:  ED  Procedure details:     Exam Type:  Diagnostic    Assessment / Evaluation for: cardiac function, pericardial effusion and inferior vena cava for fluid responsiveness      Exam Type: initial exam      Image quality: diagnostic      Image availability:  Images available in PACS  Patient Details:     Cardiac Rhythm:  Regular  Cardiac findings:     Echo technique: limited 2D      Views obtained: parasternal long axis, parasternal short axis, subcostal and apical      Pericardial effusion: absent      Tamponade physiology: absent      Wall motion: normal      LV systolic function: normal      RV dilation: none    Pulmonary findings:     Left Lung Findings: left lung sliding      Right lung findings: right lung sliding      B-lines: no B-lines present    IVC findings:     IVC Size: dilated    Interpretation:      C/w increased right sided pressures                     Sai Biggs MD  05/24/23 8026

## 2023-05-24 NOTE — ASSESSMENT & PLAN NOTE
Baseline creatinine appeast 1 3-1 4  Presented with Creatine 2 06  Hold Candesartan-HCTZ  Likely multifactorial due to episodes of hypotension, anemia, use of ARB  Due to concern for volume overload, hold any IVF     Trend renal fx

## 2023-05-24 NOTE — ASSESSMENT & PLAN NOTE
Baseline hgb appears to be 8-10  Today hemoglobin 7 5 in the setting of dark stool    Hold coumadin  Trend h/h  Transfuse <7

## 2023-05-25 ENCOUNTER — HOME CARE VISIT (OUTPATIENT)
Dept: HOME HEALTH SERVICES | Facility: HOME HEALTHCARE | Age: 88
End: 2023-05-25

## 2023-05-25 PROBLEM — Z86.718 HISTORY OF VENOUS THROMBOEMBOLISM: Status: ACTIVE | Noted: 2020-09-10

## 2023-05-25 PROBLEM — Z86.79 HISTORY OF VENTRICULAR TACHYCARDIA: Status: ACTIVE | Noted: 2018-03-22

## 2023-05-25 PROBLEM — E78.5 HYPERLIPIDEMIA: Status: ACTIVE | Noted: 2021-03-29

## 2023-05-25 PROBLEM — K92.1 MELENA: Status: ACTIVE | Noted: 2023-05-24

## 2023-05-25 PROBLEM — N18.30 ACUTE RENAL FAILURE SUPERIMPOSED ON STAGE 3 CHRONIC KIDNEY DISEASE (HCC): Status: ACTIVE | Noted: 2021-11-18

## 2023-05-25 LAB
ANION GAP SERPL CALCULATED.3IONS-SCNC: 3 MMOL/L (ref 4–13)
BASOPHILS # BLD AUTO: 0.04 THOUSANDS/ÂΜL (ref 0–0.1)
BASOPHILS NFR BLD AUTO: 1 % (ref 0–1)
BUN SERPL-MCNC: 45 MG/DL (ref 5–25)
CALCIUM SERPL-MCNC: 8.8 MG/DL (ref 8.3–10.1)
CHLORIDE SERPL-SCNC: 109 MMOL/L (ref 96–108)
CO2 SERPL-SCNC: 25 MMOL/L (ref 21–32)
CREAT SERPL-MCNC: 1.89 MG/DL (ref 0.6–1.3)
EOSINOPHIL # BLD AUTO: 0.18 THOUSAND/ÂΜL (ref 0–0.61)
EOSINOPHIL NFR BLD AUTO: 3 % (ref 0–6)
ERYTHROCYTE [DISTWIDTH] IN BLOOD BY AUTOMATED COUNT: 19.5 % (ref 11.6–15.1)
GFR SERPL CREATININE-BSD FRML MDRD: 31 ML/MIN/1.73SQ M
GLUCOSE SERPL-MCNC: 106 MG/DL (ref 65–140)
GLUCOSE SERPL-MCNC: 114 MG/DL (ref 65–140)
GLUCOSE SERPL-MCNC: 121 MG/DL (ref 65–140)
GLUCOSE SERPL-MCNC: 132 MG/DL (ref 65–140)
GLUCOSE SERPL-MCNC: 148 MG/DL (ref 65–140)
HCT VFR BLD AUTO: 24.4 % (ref 36.5–49.3)
HGB BLD-MCNC: 7.1 G/DL (ref 12–17)
IMM GRANULOCYTES # BLD AUTO: 0.03 THOUSAND/UL (ref 0–0.2)
IMM GRANULOCYTES NFR BLD AUTO: 1 % (ref 0–2)
LYMPHOCYTES # BLD AUTO: 1.12 THOUSANDS/ÂΜL (ref 0.6–4.47)
LYMPHOCYTES NFR BLD AUTO: 21 % (ref 14–44)
MCH RBC QN AUTO: 28.3 PG (ref 26.8–34.3)
MCHC RBC AUTO-ENTMCNC: 29.1 G/DL (ref 31.4–37.4)
MCV RBC AUTO: 97 FL (ref 82–98)
MONOCYTES # BLD AUTO: 0.69 THOUSAND/ÂΜL (ref 0.17–1.22)
MONOCYTES NFR BLD AUTO: 13 % (ref 4–12)
NEUTROPHILS # BLD AUTO: 3.41 THOUSANDS/ÂΜL (ref 1.85–7.62)
NEUTS SEG NFR BLD AUTO: 61 % (ref 43–75)
NRBC BLD AUTO-RTO: 0 /100 WBCS
PLATELET # BLD AUTO: 260 THOUSANDS/UL (ref 149–390)
PMV BLD AUTO: 9.6 FL (ref 8.9–12.7)
POTASSIUM SERPL-SCNC: 4.2 MMOL/L (ref 3.5–5.3)
RBC # BLD AUTO: 2.51 MILLION/UL (ref 3.88–5.62)
SODIUM SERPL-SCNC: 137 MMOL/L (ref 135–147)
TSH SERPL DL<=0.05 MIU/L-ACNC: 5.51 UIU/ML (ref 0.45–4.5)
WBC # BLD AUTO: 5.47 THOUSAND/UL (ref 4.31–10.16)

## 2023-05-25 RX ORDER — LIDOCAINE 40 MG/G
CREAM TOPICAL ONCE
Status: DISCONTINUED | OUTPATIENT
Start: 2023-05-26 | End: 2023-05-29 | Stop reason: HOSPADM

## 2023-05-25 RX ORDER — BUMETANIDE 0.25 MG/ML
2 INJECTION INTRAMUSCULAR; INTRAVENOUS 2 TIMES DAILY
Status: DISCONTINUED | OUTPATIENT
Start: 2023-05-25 | End: 2023-05-27

## 2023-05-25 RX ADMIN — TORSEMIDE 20 MG: 20 TABLET ORAL at 09:15

## 2023-05-25 RX ADMIN — SOTALOL HYDROCHLORIDE 80 MG: 80 TABLET ORAL at 09:15

## 2023-05-25 RX ADMIN — BUMETANIDE 2 MG: 0.25 INJECTION INTRAMUSCULAR; INTRAVENOUS at 17:38

## 2023-05-25 RX ADMIN — POTASSIUM CHLORIDE 20 MEQ: 750 TABLET, EXTENDED RELEASE ORAL at 09:15

## 2023-05-25 RX ADMIN — PANTOPRAZOLE SODIUM 40 MG: 40 TABLET, DELAYED RELEASE ORAL at 09:15

## 2023-05-25 RX ADMIN — GABAPENTIN 100 MG: 100 CAPSULE ORAL at 09:15

## 2023-05-25 RX ADMIN — IRON SUCROSE 200 MG: 20 INJECTION, SOLUTION INTRAVENOUS at 10:50

## 2023-05-25 RX ADMIN — BUMETANIDE 2 MG: 0.25 INJECTION INTRAMUSCULAR; INTRAVENOUS at 10:45

## 2023-05-25 RX ADMIN — ATORVASTATIN CALCIUM 40 MG: 40 TABLET, FILM COATED ORAL at 17:37

## 2023-05-25 RX ADMIN — METOLAZONE 2.5 MG: 2.5 TABLET ORAL at 09:22

## 2023-05-25 NOTE — CONSULTS
GI CONSULT:  A/P    1  Acute on chronic anemia, likely blood loss  Melena in setting of Supratherapeutic INR    Normocytic, baseline 8-9  -130, otherwise vitals stable  Hgb 9 1 in April > 7 5 yesterday, 7 1 today  INR 6 6 on 5/19, today 2 95  Tbili 1 53  BUN/Cr ratio 23  On IV venofer, no iron studies available    Plan:  -EGD with Push enteroscopy tomorrow  -NPO midnight  -Continue to hold AC  -Monitor Hgb and INR, Transfuse for Hgb <7        HISTORY OF PRESENT ILLNESS   Reason for Admission / Principal Problem: REMA, CHF  Reason for Consult: Dark stools, acute on chronic anemia    Rosamaria Lauren 81 y o  male with PMH of CHF, PAF with ICD on sotalol and warfarin, bioprosthetic AVR for endocarditis, OBINNA, chronic DVT, anemia, T2DM, venous ulcers presents for anemia  Admitted 5/24 for volume overload per Dr Quique Brenner  Started having dark stools since 5/19 and reported fully black loose stool  Since then had improvement to brown/dark stools, without clots  Patient reports no bowel movements today, had 2 yesterday which were more brown and formed stool  No pain with defecation, no blood on toilet paper, no masses felt  Denies N/V, hematemesis, chest pain or SOB, constipation or diarrhea  Patient had history of acute cholecystis, initially had cholecystostomy done which was removed, then required a second  There were calcified stone on CT with fistula to colon  Then on 3/8/23 had IR perc cholangioscopy, lithotripsy, and basket retrieval  4/10 IR ultrasound guided 10 Nigerien perihepatic fluid collection drainage catheter, no residual collection and no output  Cholecystomy tube was recently removed and treated for abscess  Upper GI fluoro :  Small hiatal hernia, suboptimal study    CT abd 4/4/23:  4  Interval removal of percutaneous cholecystostomy tube    Gallbladder is contracted containing small stones demonstrated mild diffuse wall thickening, similar compared to the prior exam   Findings may indicate chronic cholecystitis  5   There is an irregular partially loculated fluid collection surrounding the cecum, ascending colon, and inferior right hepatic lobe measuring approximately 9 4 x 5 1 cm in axial dimension and 13 cm in craniocaudal dimension  This could be from bile   leak given interval cholecystostomy tube placement and removal   Alternatively, cannot exclude contained perforation of the right colon without pericolonic abscess  Clinical correlation recommended  6   No evidence for bowel obstruction, obstructive uropathy, or free air  7   Small umbilical hernia containing mesenteric fat and trace ascites noted  8   Subcutaneous stranding in the lower abdominal/pelvic wall and bilateral flank regions could be due to edema or cellulitis  Last Colonoscopy 2015:  Transverse and descending colon hyperplastic polyp, multiple tissue fragments      SUBJECTIVE   As stated above  REVIEW OF SYSTEMS   Review of Systems   All other systems reviewed and are negative  OBJECTIVE     Vitals:    23 0722   BP: 114/60 101/55 105/53 130/67   BP Location:       Pulse: 74 74 73 74   Resp: 20 20 17 16   Temp:   98 2 °F (36 8 °C) 98 4 °F (36 9 °C)   TempSrc:       SpO2: 97% 95% 95% 97%      Temperature:   Temp (24hrs), Av °F (36 7 °C), Min:97 5 °F (36 4 °C), Max:98 4 °F (36 9 °C)    Temperature: 98 4 °F (36 9 °C)  Intake & Output:  I/O        0701   0700  0701   0700  07 0700    P  O    120    Total Intake   120    Urine  400     Total Output  400     Net  -400 +120               Weights: There is no height or weight on file to calculate BMI  Weight (last 2 days)     None        Physical Exam  Vitals reviewed  Constitutional:       Appearance: He is obese  HENT:      Head: Normocephalic and atraumatic  Mouth/Throat:      Pharynx: Oropharynx is clear     Eyes:      Extraocular Movements: Extraocular movements intact  Cardiovascular:      Rate and Rhythm: Normal rate and regular rhythm  Abdominal:      General: Bowel sounds are normal  There is distension  Palpations: Abdomen is soft  Tenderness: There is abdominal tenderness (mild epigastric)  Musculoskeletal:      Right lower leg: Edema present  Left lower leg: Edema present  Neurological:      Mental Status: He is alert and oriented to person, place, and time         PAST MEDICAL HISTORY     Past Medical History:   Diagnosis Date   • Anemia    • Arthritis    • Atrial fibrillation ([facility] )    • Basal cell carcinoma 03/22/2022    Tip of Nose   • CHF (congestive heart failure) (HCC)    • Diabetes mellitus ([facility] [address] )     Niddm   • DVT (deep vein thrombosis) in pregnancy 1966    not in pregnancy   • DVT (deep venous thrombosis) ([facility] [address] ) 1966   • Dyslipidemia    • Encephalopathy acute 11/4/2022   • GERD (gastroesophageal reflux disease)    • Hyperlipidemia    • Hypertension    • Hypomagnesemia 10/19/2022   • Irregular heart beat     Afib   • Morbid obesity due to excess calories ([facility] [address] )     Resolved 9/2/2014    • Pulmonary embolism (HCC)    • [facility]    • Squamous cell skin cancer 07/30/2020    Left posterior scalp   • Visual impairment      PAST SURGICAL HISTORY     Past Surgical History:   Procedure Laterality Date   • AORTIC VALVE REPLACEMENT     • CARDIAC DEFIBRILLATOR PLACEMENT  04/2014   • CARDIAC SURGERY  02/2014    AVR   • COLONOSCOPY     • INSERT / REPLACE / REMOVE PACEMAKER     • IR ASPIRATION BAKERS CYST  3/8/2023   • IR CHOLECYSTOSTOMY TUBE CHECK/CHANGE/REPOSITION/REINSERTION/UPSIZE  10/13/2022   • IR CHOLECYSTOSTOMY TUBE CHECK/CHANGE/REPOSITION/REINSERTION/UPSIZE  10/19/2022   • IR CHOLECYSTOSTOMY TUBE CHECK/CHANGE/REPOSITION/REINSERTION/UPSIZE  10/27/2022   • IR CHOLECYSTOSTOMY TUBE CHECK/CHANGE/REPOSITION/REINSERTION/UPSIZE  11/7/2022   • IR CHOLECYSTOSTOMY TUBE CHECK/CHANGE/REPOSITION/REINSERTION/UPSIZE  11/10/2022   • IR CHOLECYSTOSTOMY TUBE CHECK/CHANGE/REPOSITION/REINSERTION/UPSIZE  12/1/2022   • IR CHOLECYSTOSTOMY TUBE CHECK/CHANGE/REPOSITION/REINSERTION/UPSIZE  1/6/2023   • IR CHOLECYSTOSTOMY TUBE CHECK/CHANGE/REPOSITION/REINSERTION/UPSIZE  1/24/2023   • IR CHOLECYSTOSTOMY TUBE CHECK/CHANGE/REPOSITION/REINSERTION/UPSIZE  3/15/2023   • IR CHOLECYSTOSTOMY TUBE PLACEMENT  9/1/2022   • IR DRAINAGE TUBE CHECK AND/OR REMOVAL  3/22/2023   • IR DRAINAGE TUBE CHECK AND/OR REMOVAL  4/10/2023   • IR DRAINAGE TUBE PLACEMENT  4/6/2023   • JOINT REPLACEMENT Left     LTKR   • MOHS SURGERY  07/30/2020    Left posterior scalp, Dr Boyd   • MOHS SURGERY  04/18/2022    [facility] Tip of Nose- Dr Boyd   • Julane Moat DIVJ&STRIP LONG SAPH SAPHFEM Merilee Devi Right 8/17/2018    Procedure: LEG PERFORATED INJECTION SCLEROTHERAPY;  Surgeon: Cielo Crooks MD;  Location: [facility]  MAIN [facility];  Service: Vascular   • REPLACEMENT TOTAL KNEE Right    • TOTAL KNEE ARTHROPLASTY Left    • VASCULAR SURGERY     • VENA CAVA FILTER PLACEMENT      Interruption inferior vena cava, Lusk filter, placement   • WISDOM TOOTH EXTRACTION       SOCIAL & FAMILY HISTORY     Social History     Substance and Sexual Activity   Alcohol Use Not Currently    Comment: no alcohol in 28 yrs     Social History     Substance and Sexual Activity   Drug Use Never     Social History     Tobacco Use   Smoking Status Never   Smokeless Tobacco Never     Family History   Problem Relation Age of Onset   • Arthritis Mother    • Stroke Mother    • Arthritis Father    • Cancer Father    • Arthritis Daughter      LABORATORY DATA     Labs: I have personally reviewed pertinent reports      Results from last 7 days   Lab Units 05/25/23  0535 05/24/23  1609 05/19/23  1449   EOS PCT % 3 3 4   HEMATOCRIT % 24 4* 24 0* 25 8*   HEMOGLOBIN g/dL 7 1* 7 5* 7 8*   MONOS PCT % 13* 11 12   NEUTROS PCT % 61 67 64   PLATELETS Thousands/uL 260 256 239   WBC Thousand/uL 5 47 5 75 5 05      Results from last 7 days   Lab Units 05/25/23  0535 05/24/23  1609 05/19/23  1449   ALK PHOS U/L  --  103 103   ALT U/L  --  21 13   AST U/L  --  34 22   BUN mg/dL 45* 45* 45*   CALCIUM mg/dL 8 8 8 7 8 7   CHLORIDE mmol/L 109* 107 105   CO2 mmol/L 25 24 25   CREATININE mg/dL 1 89* 2 06* 2 16*   POTASSIUM mmol/L 4 2 4 3 5 0     Results from last 7 days   Lab Units 05/24/23  1609   MAGNESIUM mg/dL 2 0     Results from last 7 days   Lab Units 05/24/23  1609   PHOSPHORUS mg/dL 4 0      Results from last 7 days   Lab Units 05/24/23  1609 05/19/23  1450   INR  2 95* 6 64*   PTT seconds 48*  --              Micro:  Lab Results   Component Value Date    BLOODCX No Growth After 5 Days  04/04/2023    BLOODCX No Growth After 5 Days  04/04/2023    BLOODCX No Growth After 5 Days  11/04/2022    BLOODCX No Growth After 5 Days  11/04/2022    SPUTUMCULTUR 4+ Growth of Candida albicans (A) 04/05/2023    SPUTUMCULTUR 4+ Growth of 04/05/2023    URINECX No Growth <1000 cfu/mL 07/09/2019    URINECX >100,000 cfu/ml Escherichia coli 05/19/2017    URINECX No Growth <1000 cfu/mL 04/20/2017    WOUNDCULT (A) 11/26/2018     1+ Growth of Methicillin Resistant Staphylococcus aureus    WOUNDCULT 2+ Growth of 11/26/2018     IMAGING & DIAGNOSTIC TESTS     Imaging: I have personally reviewed pertinent reports  XR chest 1 view portable    Result Date: 5/25/2023  Impression: Mild pulmonary vascular congestion and small bilateral pleural effusions Workstation performed: [id number]     EKG, Pathology, and Other Studies: I have personally reviewed pertinent reports  ALLERGIES     Allergies   Allergen Reactions   • Tramadol Other (See Comments)     intolerance     MEDICATIONS PRIOR TO ARRIVAL     Prior to Admission medications    Medication Sig Start Date End Date Taking?  Authorizing Provider   B Complex-C (SUPER B COMPLEX PO) Take 1 capsule by mouth daily    Yes Historical Provider, MD   benzonatate (TESSALON PERLES) 100 mg capsule Take 1 capsule (100 mg total) by mouth 3 (three) times a day as needed for cough 4/7/23  Yes Amy Brock PA-C   candesartan-hydrochlorothiazide (ATACAND HCT) 16-12 5 MG per tablet Take 1 tablet by mouth daily  Indications: High Blood Pressure Disorder   Yes [facility], DO   Diclofenac Sodium (VOLTAREN) 1 % Apply 2 g topically 4 (four) times a day 11/14/22  Yes Baylee Bean MD   gabapentin (NEURONTIN) 100 mg capsule Take 100 mg by mouth daily 1/25/22  Yes Historical Provider, MD   Iron Combinations (NIFEREX) TABS Take 1 tablet by mouth in the morning 2/13/18  Yes Historical Provider, MD   lubiprostone (AMITIZA) 24 mcg capsule Take 24 mcg by mouth 2 (two) times a day with meals  Indications: Chronic Constipation of Unknown Cause   Yes [facility], DO   metolazone (ZAROXOLYN) 2 5 mg tablet Take 1 tablet (2 5 mg total) by mouth 2 (two) times a week Take on Saturday AM and Thursday AM 30 minutes prior to AM torsemide  4/27/23  Yes Ayaz Jimenez MD   Multiple Vitamin (MULTIVITAMINS PO) Take 1 tablet by mouth daily   Yes Historical Provider, MD   omeprazole (PriLOSEC) 20 mg delayed release capsule Omeprazole 20 MG Oral Tablet Delayed Release Take 1 tablet daily  Refills: 0  Active   Yes Historical Provider, MD   polyethylene glycol (GLYCOLAX) 17 GM/SCOOP powder Take 17 g by mouth 2 (two) times a day   Yes Historical Provider, MD   Potassium Chloride ER 20 MEQ TBCR Take 20 mEq by mouth in the morning  Indications: Low Amount of Potassium in the Blood 4/8/23  Yes [facility], DO   sodium chloride, PF, 0 9 % 10 mL by Intracatheter route daily Intracatheter flushing daily  May substitute prefilled syringe with normal saline 10 mL vials, 10 mL syringes, and 18 g blunt needles  Patient taking differently: 10 mL by Intracatheter route every 12 (twelve) hours Intracatheter flushing daily   May substitute prefilled syringe with normal saline 10 mL vials, 10 mL syringes, and 18 g blunt needles 12/1/22 5/24/23 Yes ODESSA Roman   torsemide (DEMADEX) 20 mg tablet Take 20 mg by mouth daily and 10 mg in PM   Yes Historical Provider, MD   warfarin (COUMADIN) 2 mg tablet Take 2 tablets (4 mg total) by mouth daily Acknowledge  Patient taking differently: Take 4 mg by mouth daily Pt taking 6mg Tue/Thur/Sat and 4 mg M/W/F/Sun 10/24/22  Yes Verna Fernandes MD   Acetaminophen (TYLENOL EXTRA STRENGTH PO) Take 500 mg by mouth if needed (for pain as needed)  Indications: pain as needed    Historical Provider, MD   albuterol (PROVENTIL HFA,VENTOLIN HFA) 90 mcg/act inhaler Inhale 2 puffs every 6 (six) hours as needed for wheezing or shortness of breath  Patient not taking: Reported on 5/24/2023 3/4/21   Historical Provider, MD   allopurinol (ZYLOPRIM) 100 mg tablet Take 1 tablet (100 mg total) by mouth daily Do not start before November 15, 2022  Patient not taking: Reported on 4/8/2023 11/15/22 2/13/23  Jhony Degroot MD   atorvastatin (LIPITOR) 40 mg tablet Take 40 mg by mouth daily after dinner Atorvastatin Calcium 40 MG Oral Tablet Take 1 tablet daily  Refills: 0  Active   Patient not taking: Reported on 5/24/2023    Historical Provider, MD   Cholecalciferol 25 MCG (1000 UT) capsule Take 3,000 Units by mouth daily  Indications: Vitamin D Deficiency    Brent Jaramillo DO   fluticasone (FLONASE) 50 mcg/act nasal spray 2 sprays into each nostril daily as needed Shake liquid and spray  Patient not taking: Reported on 5/24/2023 7/7/21   Historical Provider, MD   metFORMIN (GLUCOPHAGE) 1000 MG tablet Take 1,000 mg by mouth daily with dinner   Indications: Type 2 Diabetes    Historical Provider, MD   mirtazapine (REMERON) 7 5 MG tablet Take 15 mg by mouth daily at bedtime ordered by Dr Brent Jaramillo  Patient not taking: Reported on 5/24/2023 11/30/22   Historical Provider, MD   senna-docusate sodium (SENOKOT S) 8 6-50 mg per tablet Take 1 tablet by mouth daily at bedtime 4/7/23   Day Goodwin, PA-C   sodium chloride, PF, 0 9 % 10 mL by Intracatheter route daily for 120 doses Intracatheter flushing daily  May substitute prefilled syringe with normal saline 10 mL vials, 10 mL syringes, and 18 g blunt needles 4/6/23 8/4/23  Alma Ugarte MD   sodium hypochlorite (DAKIN'S HALF-STRENGTH) external solution Apply 1 application   topically daily At each dressing change 5/9/23   Andree Kurtz DO   sotalol (BETAPACE) 80 mg tablet Take 1 tablet (80 mg total) by mouth daily 10/24/22   Soheila Lucas MD     MEDICATIONS ADMINISTERED IN LAST 24 HOURS     Medication Administration - last 24 hours from 05/24/2023 1040 to 05/25/2023 1040       Date/Time Order Dose Route Action Action by     05/24/2023 2235 EDT atorvastatin (LIPITOR) tablet 40 mg 40 mg Oral Not Given Juliette Leal RN     05/25/2023 0915 EDT gabapentin (NEURONTIN) capsule 100 mg 100 mg Oral Given Whatley Doing     05/25/2023 0915 EDT sotalol (BETAPACE) tablet 80 mg 80 mg Oral Given Whatley Doing     05/25/2023 0915 EDT potassium chloride (K-DUR,KLOR-CON) CR tablet 20 mEq 20 mEq Oral Given Whatley Doing     05/25/2023 0915 EDT pantoprazole (PROTONIX) EC tablet 40 mg 40 mg Oral Given Whatley Doing     05/25/2023 0915 EDT torsemide (DEMADEX) tablet 20 mg 20 mg Oral Given Whatley Doing     05/24/2023 2221 EDT torsemide (DEMADEX) tablet 10 mg 10 mg Oral Not Given Juliette Leal RN     05/25/2023 7560 EDT metolazone (ZAROXOLYN) tablet 2 5 mg 2 5 mg Oral Given Whatley Doing     05/25/2023 0913 EDT insulin lispro (HumaLOG) 100 units/mL subcutaneous injection 1-6 Units -- Subcutaneous Not Given Whatley Doing     05/24/2023 2235 EDT insulin lispro (HumaLOG) 100 units/mL subcutaneous injection 1-5 Units -- Subcutaneous Not Given Juliette Leal RN        CURRENT MEDICATIONS     Current Facility-Administered Medications   Medication Dose Route Frequency Provider Last Rate   • acetaminophen  650 mg Oral Q4H PRN Kandi Walton MD     • albuterol  2 puff "Inhalation Q6H PRN Elsy Mathews MD     • atorvastatin  40 mg Oral After Tana Lane MD     • fluticasone  2 spray Each Nare Daily PRN Elsy Mathews MD     • gabapentin  100 mg Oral Daily Elsy Mathews MD     • insulin lispro  1-5 Units Subcutaneous HS Elsy Mathews MD     • insulin lispro  1-6 Units Subcutaneous TID Gene Ma MD     • iron sucrose  200 mg Intravenous Daily Lee Leal MD     • metolazone  2 5 mg Oral Once per day on Mon Thu Elsy Mathews MD     • ondansetron  4 mg Intravenous Q6H PRN Elsy Mathews MD     • pantoprazole  40 mg Oral Early Morning Elsy Mathews MD     • potassium chloride  20 mEq Oral Daily Elsy Mathews MD     • sotalol  80 mg Oral Daily Elsy Mathews MD     • torsemide  10 mg Oral QPM Elsy Mathews MD     • torsemide  20 mg Oral Daily Elsy Mathews MD       Facility-Administered Medications Ordered in Other Encounters   Medication Dose Route Frequency Provider Last Rate   • cefTRIAXone  2,000 mg Intravenous Once Bernie Hadley MD     • sodium chloride  75 mL/hr Intravenous Continuous Bernie Hadley MD 75 mL/hr (03/08/23 1000)        acetaminophen, 650 mg, Q4H PRN  albuterol, 2 puff, Q6H PRN  fluticasone, 2 spray, Daily PRN  ondansetron, 4 mg, Q6H PRN        Portions of the record may have been created with voice recognition software  Occasional wrong word or \"sound a like\" substitutions may have occurred due to the inherent limitations of voice recognition software    Read the chart carefully and recognize, using context, where substitutions have occurred     ==  Christiane Lewis MD  [address] [facility]  Internal Medicine Residency  "

## 2023-05-25 NOTE — ASSESSMENT & PLAN NOTE
Progressive dark stools starting last week  Worsening anemia noted (see below)  Holding Amitiza/Coumadin  Gastroenterology to follow  Monitor H/H and transfuse if necessary

## 2023-05-25 NOTE — ASSESSMENT & PLAN NOTE
Baseline hemoglobin approximately 9-10 -> currently worsened at 7 1 with associated dark stools  Anticoagulation (Coumadin) remains held  Initiated IV Venofer trial  Will appreciate gastroenterology input

## 2023-05-25 NOTE — CONSULTS
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, Ventricular tachycardia s/p MDT-DC ICD, Diastolic CHF (LVEF 17% in 2022), pHTN with RVSP 70 mmHg and Moderately reduced RV function who presents from the outpatient cardiology office with complaints of exertional dyspnea  Recently had cholecystostomy tube removed after she was treated for abscess 3 weeks ago  Developed dark stool started on 5/19/23  Went to ED - INR elevated, anemic (Hgb baseline 9-10 but down to 7's) and worsening renal function  In the outpatient visit she was noted to be hypotensive (98/58 mmHg)  Creatinine also elevated from a baseline of 1 3 to 2 06  Patient sent in for signs of significant weight gain and volume overload as well as hypotension  Review of Systems  Review of system was conducted and was negative except for as stated in the HPI        Historical Information   Past Medical History:   Diagnosis Date    Anemia     Arthritis     Atrial fibrillation ([address] )     Basal cell carcinoma 03/22/2022    Tip of Nose    CHF (congestive heart failure) (HCC)     Diabetes mellitus ([address] )     Niddm    DVT (deep vein thrombosis) in pregnancy 1966    not in pregnancy    DVT (deep venous thrombosis) ([address] ) 1966    Dyslipidemia     Encephalopathy acute 11/4/2022    GERD (gastroesophageal reflux disease)     Hyperlipidemia     Hypertension     Hypomagnesemia 10/19/2022    Irregular heart beat     Afib    Morbid obesity due to excess calories ([address] )     Resolved 9/2/2014     Pulmonary embolism (HCC)     Sepsis ([facility] [address] )     Squamous cell skin cancer 07/30/2020    Left posterior scalp    Visual impairment      Past Surgical History:   Procedure Laterality Date    AORTIC VALVE REPLACEMENT      CARDIAC DEFIBRILLATOR PLACEMENT  04/2014    CARDIAC SURGERY  02/2014    AVR    COLONOSCOPY      INSERT / REPLACE / REMOVE PACEMAKER      IR ASPIRATION BAKERS CYST  3/8/2023    IR CHOLECYSTOSTOMY TUBE CHECK/CHANGE/REPOSITION/REINSERTION/UPSIZE  10/13/2022    IR CHOLECYSTOSTOMY TUBE CHECK/CHANGE/REPOSITION/REINSERTION/UPSIZE  10/19/2022    IR CHOLECYSTOSTOMY TUBE CHECK/CHANGE/REPOSITION/REINSERTION/UPSIZE  10/27/2022    IR CHOLECYSTOSTOMY TUBE CHECK/CHANGE/REPOSITION/REINSERTION/UPSIZE  11/7/2022    IR CHOLECYSTOSTOMY TUBE CHECK/CHANGE/REPOSITION/REINSERTION/UPSIZE  11/10/2022    IR CHOLECYSTOSTOMY TUBE CHECK/CHANGE/REPOSITION/REINSERTION/UPSIZE  12/1/2022    IR CHOLECYSTOSTOMY TUBE CHECK/CHANGE/REPOSITION/REINSERTION/UPSIZE  1/6/2023    IR CHOLECYSTOSTOMY TUBE CHECK/CHANGE/REPOSITION/REINSERTION/UPSIZE  1/24/2023    IR CHOLECYSTOSTOMY TUBE CHECK/CHANGE/REPOSITION/REINSERTION/UPSIZE  3/15/2023    IR CHOLECYSTOSTOMY TUBE PLACEMENT  9/1/2022    IR DRAINAGE TUBE CHECK AND/OR REMOVAL  3/22/2023    IR DRAINAGE TUBE CHECK AND/OR REMOVAL  4/10/2023    IR DRAINAGE TUBE PLACEMENT  4/6/2023    JOINT REPLACEMENT Left     LTKR    MOHS SURGERY  07/30/2020    Left posterior scalp, Dr Ping Multani  04/18/2022    [address] Tip of Nose- Dr oCoper Falling DIVJ&STRIP LONG SAPH Keller Harden Right 8/17/2018    Procedure: LEG PERFORATED INJECTION SCLEROTHERAPY;  Surgeon: Julianna Calderon MD;  Location: [facility];  Service: Vascular    REPLACEMENT TOTAL KNEE Right     TOTAL KNEE ARTHROPLASTY Left     VASCULAR SURGERY      VENA CAVA FILTER PLACEMENT      Interruption inferior vena cava, [place] filter, placement    WISDOM TOOTH EXTRACTION       Social History     Substance and Sexual Activity   Alcohol Use Not Currently    Comment: no alcohol in 28 yrs     Social History     Substance and Sexual Activity   Drug Use Never     Social History     Tobacco Use   Smoking Status Never   Smokeless Tobacco Never     Family History:   Family History   Problem Relation Age of Onset    Arthritis Mother     Stroke Mother     Arthritis Father     Cancer Father     Arthritis Daughter        Meds/Allergies   Hospital Medications:   Current Facility-Administered Medications   Medication Dose Route Frequency    acetaminophen (TYLENOL) tablet 650 mg  650 mg Oral Q4H PRN    albuterol (PROVENTIL HFA,VENTOLIN HFA) inhaler 2 puff  2 puff Inhalation Q6H PRN    atorvastatin (LIPITOR) tablet 40 mg  40 mg Oral After Dinner    fluticasone (FLONASE) 50 mcg/act nasal spray 2 spray  2 spray Each Nare Daily PRN    gabapentin (NEURONTIN) capsule 100 mg  100 mg Oral Daily    insulin lispro (HumaLOG) 100 units/mL subcutaneous injection 1-5 Units  1-5 Units Subcutaneous HS    insulin lispro (HumaLOG) 100 units/mL subcutaneous injection 1-6 Units  1-6 Units Subcutaneous TID AC    metolazone (ZAROXOLYN) tablet 2 5 mg  2 5 mg Oral Once per day on Mon Thu    ondansetron (ZOFRAN) injection 4 mg  4 mg Intravenous Q6H PRN    pantoprazole (PROTONIX) EC tablet 40 mg  40 mg Oral Early Morning    potassium chloride (K-DUR,KLOR-CON) CR tablet 20 mEq  20 mEq Oral Daily    sotalol (BETAPACE) tablet 80 mg  80 mg Oral Daily    torsemide (DEMADEX) tablet 10 mg  10 mg Oral QPM    torsemide (DEMADEX) tablet 20 mg  20 mg Oral Daily     Facility-Administered Medications Ordered in Other Encounters   Medication Dose Route Frequency    cefTRIAXone (ROCEPHIN) 2,000 mg in dextrose 5 % 50 mL IVPB  2,000 mg Intravenous Once    sodium chloride 0 9 % infusion  75 mL/hr Intravenous Continuous     Home Medications:   Medications Prior to Admission   Medication    B Complex-C (SUPER B COMPLEX PO)    benzonatate (TESSALON PERLES) 100 mg capsule    candesartan-hydrochlorothiazide (ATACAND HCT) 16-12 5 MG per tablet    Diclofenac Sodium (VOLTAREN) 1 %    gabapentin (NEURONTIN) 100 mg capsule    Iron Combinations (NIFEREX) TABS    lubiprostone (AMITIZA) 24 mcg capsule    metolazone (ZAROXOLYN) 2 5 mg tablet    Multiple Vitamin (MULTIVITAMINS PO)    omeprazole (PriLOSEC) 20 mg delayed release capsule    polyethylene glycol (GLYCOLAX) 17 GM/SCOOP powder    Potassium Chloride ER 20 MEQ TBCR    sodium chloride, PF, 0 9 %    torsemide (DEMADEX) 20 mg tablet    warfarin (COUMADIN) 2 mg tablet    Acetaminophen (TYLENOL EXTRA STRENGTH PO)    albuterol (PROVENTIL HFA,VENTOLIN HFA) 90 mcg/act inhaler    allopurinol (ZYLOPRIM) 100 mg tablet    atorvastatin (LIPITOR) 40 mg tablet    Cholecalciferol 25 MCG (1000 UT) capsule    fluticasone (FLONASE) 50 mcg/act nasal spray    metFORMIN (GLUCOPHAGE) 1000 MG tablet    mirtazapine (REMERON) 7 5 MG tablet    senna-docusate sodium (SENOKOT S) 8 6-50 mg per tablet    sodium chloride, PF, 0 9 %    sodium hypochlorite (DAKIN'S HALF-STRENGTH) external solution    sotalol (BETAPACE) 80 mg tablet       Allergies   Allergen Reactions    Tramadol Other (See Comments)     intolerance       Objective   Vitals: Blood pressure 130/67, pulse 74, temperature 98 4 °F (36 9 °C), resp  rate 16, SpO2 97 %  Orthostatic Blood Pressures      Flowsheet Row Most Recent Value   Blood Pressure 130/67 filed at 05/25/2023 [id number]   Patient Position - Orthostatic VS Lying filed at 05/24/2023 1955              Invasive Devices       Peripheral Intravenous Line  Duration             Peripheral IV 05/24/23 Distal;Right;Upper;Ventral (anterior) Arm <1 day                    Physical Exam  GEN: Jagjit Raphael appears well, alert and oriented x 3, pleasant and cooperative   HEENT:  Normocephalic, atraumatic, anicteric, moist mucous membranes  NECK: +JVD  HEART: reg rhythm, reg rate, normal S1 and S2, no murmurs, clicks, gallops or rubs   LUNGS: crackles in bilateral lungs up to mid level    ABDOMEN: Normoactive bowel sounds, soft, no tenderness, no distention  EXTREMITIES: peripheral pulses palpable; 3-4+ tense edema in b/l LE  NEURO: no gross focal findings; cranial nerves grossly intact   SKIN:  Dry, intact, warm to touch    Lab Results: I have personally reviewed pertinent lab results  Results from last 7 days   Lab Units 05/24/23 2015 05/24/23  1825 05/24/23  1609   HS TNI 0HR ng/L  --   --  12   HS TNI 2HR ng/L  --  11  --    HS TNI 4HR ng/L 11  --   --          Results from last 7 days   Lab Units 05/25/23  0535 05/24/23  1609 05/19/23  1449   BUN mg/dL 45* 45* 45*   CHLORIDE mmol/L 109* 107 105   CO2 mmol/L 25 24 25   CREATININE mg/dL 1 89* 2 06* 2 16*   POTASSIUM mmol/L 4 2 4 3 5 0     Results from last 7 days   Lab Units 05/25/23 0535 05/24/23  1609 05/19/23  1449   HEMATOCRIT % 24 4* 24 0* 25 8*   HEMOGLOBIN g/dL 7 1* 7 5* 7 8*   PLATELETS Thousands/uL 260 256 239             Imaging: I have personally reviewed pertinent reports  ECHO:  Results for orders placed during the hospital encounter of 11/17/20    LEFT VENTRICLE:  Systolic function was normal  Ejection fraction was estimated to be 55 %  Although no diagnostic regional wall motion abnormality was identified, this possibility cannot be completely excluded on the basis of this study  Wall thickness was mildly to moderately increased  There was mild concentric hypertrophy  RIGHT VENTRICLE:  The ventricle was mildly dilated  Wall thickness was mildly increased  LEFT ATRIUM:  The atrium was mildly to moderately dilated  RIGHT ATRIUM:  The atrium was mildly to moderately dilated  MITRAL VALVE:  There was mild to moderate annular calcification  There was mild regurgitation  AORTIC VALVE:  A bioprosthesis was present  It exhibited normal function  The peak valve velocity was 197 cm/s  The mean valve velocity was 152 cm/s  Valve peak gradient was 16 mmHg  Valve mean gradient was 9 mmHg    Estimated aortic valve area (by VTI) was 2 16 cmï¾²  TRICUSPID VALVE:  There was mild regurgitation  Pulmonary artery systolic pressure was mildly increased  Estimated peak PA pressure was 40 mmHg  PULMONIC VALVE:  There was mild regurgitation  Results for orders placed during the hospital encounter of 05/07/22    Echo complete    Interpretation Summary    Left Ventricle: Left ventricular cavity size is normal  Wall thickness is mildly increased  The left ventricular ejection fraction is 60%  Systolic function is normal  Wall motion is normal     Right Ventricle: Right ventricular cavity size is moderately dilated  Systolic function is moderately reduced  Left Atrium: The atrium is mildly dilated  Right Atrium: The atrium is mildly dilated  Aortic Valve: There is a bioprosthetic valve  Mitral Valve: There is moderate calcification  There is mild annular calcification  There is mild to moderate regurgitation  Tricuspid Valve: There is moderate regurgitation  Pulmonic Valve: There is mild regurgitation  Pulmonary Artery: The estimated pulmonary artery systolic pressure is 62 1 mmHg  The pulmonary artery systolic pressure is severely increased  VTE Prophylaxis: Heparin       Assessment/Plan     Assessment:    1  Hypotension and REMA Multifactorial Likely 2/2 Congestive HF with component of Acute Blood Loss Anemia in setting of Supratherapeutic INR  --> On 5/19/23 INR was 6 6 and on presentation it was 3  --> Troponin unremarkable  2  Atrial Fibrillation on Warfarin and Sotalol  3  DM2   4  HTN // HLD   5  DVT on Warfarin   6  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   7  Ventricular tachycardia s/p MDT-DC ICD   8  Diastolic CHF (LVEF 89% in 2022)   9   pHTN with RVSP 70 mmHg and Moderately reduced RV function    Plan:  - c/w Lipitor 40mg PO QHS  - c/w Sotalol 80mg PO Daily  - Stop Torsemide and switch to Bumex 2mg IV BID given significant volume overload  --> Patients BP has now normalized  --> Patient may require a drip if not diuresing adequately  Can also do PRN Metolazone for further augmentation  - Resume Coumadin once able as per primary  - Replete electrolytes PRN with Mg/Phos/K goals of 2/3/4 respectively    Case discussed and reviewed with Dr Kelly Pereira who agrees with my assessment and plan  Thank you for involving us in the care of your patient  Pancho Bone MD  Cardiology Fellow PGY-6    ==========================================================================================    Counseling / Coordination of Care  Total floor / unit time spent today 45 minutes minutes  Greater than 50% of total time was spent with the patient and / or family counseling and / or coordination of care  A description of the counseling / coordination of care:       Epic/ EXO5/Care Everywhere records reviewed:     ** Please Note: Fluency DirectDictation voice to text software may have been used in the creation of this document   **

## 2023-05-25 NOTE — PLAN OF CARE
Problem: MOBILITY - ADULT  Goal: Maintain or return to baseline ADL function  Description: INTERVENTIONS:  -  Assess patient's ability to carry out ADLs; assess patient's baseline for ADL function and identify physical deficits which impact ability to perform ADLs (bathing, care of mouth/teeth, toileting, grooming, dressing, etc )  - Assess/evaluate cause of self-care deficits   - Assess range of motion  - Assess patient's mobility; develop plan if impaired  - Assess patient's need for assistive devices and provide as appropriate  - Encourage maximum independence but intervene and supervise when necessary  - Involve family in performance of ADLs  - Assess for home care needs following discharge   - Consider OT consult to assist with ADL evaluation and planning for discharge  - Provide patient education as appropriate  Outcome: Progressing  Goal: Maintains/Returns to pre admission functional level  Description: INTERVENTIONS:  - Perform BMAT or MOVE assessment daily    - Set and communicate daily mobility goal to care team and patient/family/caregiver  - Collaborate with rehabilitation services on mobility goals if consulted  - Perform Range of Motion 3 times a day  - Reposition patient every 2 hours    - Dangle patient 3 times a day  - Stand patient 3 times a day  - Ambulate patient 3 times a day  - Out of bed to chair 3 times a day   - Out of bed for meals 3 times a day  - Out of bed for toileting  - Record patient progress and toleration of activity level   Outcome: Progressing     Problem: PAIN - ADULT  Goal: Verbalizes/displays adequate comfort level or baseline comfort level  Description: Interventions:  - Encourage patient to monitor pain and request assistance  - Assess pain using appropriate pain scale  - Administer analgesics based on type and severity of pain and evaluate response  - Implement non-pharmacological measures as appropriate and evaluate response  - Consider cultural and social influences on pain and pain management  - Notify physician/advanced practitioner if interventions unsuccessful or patient reports new pain  Outcome: Progressing     Problem: INFECTION - ADULT  Goal: Absence or prevention of progression during hospitalization  Description: INTERVENTIONS:  - Assess and monitor for signs and symptoms of infection  - Monitor lab/diagnostic results  - Monitor all insertion sites, i e  indwelling lines, tubes, and drains  - Monitor endotracheal if appropriate and nasal secretions for changes in amount and color  - [place] appropriate cooling/warming therapies per order  - Administer medications as ordered  - Instruct and encourage patient and family to use good hand hygiene technique  - Identify and instruct in appropriate isolation precautions for identified infection/condition  Outcome: Progressing  Goal: Absence of fever/infection during neutropenic period  Description: INTERVENTIONS:  - Monitor WBC    Outcome: Progressing     Problem: DISCHARGE PLANNING  Goal: Discharge to home or other facility with appropriate resources  Description: INTERVENTIONS:  - Identify barriers to discharge w/patient and caregiver  - Arrange for needed discharge resources and transportation as appropriate  - Identify discharge learning needs (meds, wound care, etc )  - Arrange for interpretive services to assist at discharge as needed  - Refer to Case Management Department for coordinating discharge planning if the patient needs post-hospital services based on physician/advanced practitioner order or complex needs related to functional status, cognitive ability, or social support system  Outcome: Progressing

## 2023-05-25 NOTE — RESTORATIVE TECHNICIAN NOTE
Restorative Technician Note      Patient Name: Gaylan Skiff     Restorative Tech Visit Date: 05/25/23  Note Type: Mobility  Patient Position Upon Consult: Supine  Activity Performed: Transferred  Assistive Device: Standard walker  Education Provided: Yes  Patient Position at End of Consult: Bedside chair;  All needs within reach    Sanjeev HERNANDEZT, Restorative Technician

## 2023-05-25 NOTE — DISCHARGE INSTR - OTHER ORDERS
Cleanse b/l sacro-buttocks and posterior thighs with soap and water, pat dry, and apply triad paste to the open aspects TID and PRN    Cleanse R lower extremity wound with dakin's quarter strength solution, pat dry, and apply maxorb silver to the wound  Cover with ABD pad  Secure the dressings with juanjo wrap  Apply ace wrap for compression from base of toes to the knee - may remove ACE wraps at night if needed  Change every other day and as needed for soilage/dislodgement  Cleanse R abdominal pannus with soap and water, pat dry, and apply maxorb rope daily and PRN for soilage/dislodgement

## 2023-05-25 NOTE — PROGRESS NOTES
[facility]  Progress Note  Name: Edwige Tobar  MRN: [id number]  Unit/Bed#: PPHP 726-91 I Date of Admission: 5/24/2023   Date of Service: 5/25/2023 I Hospital Day: 1      Assessment & Plan:    * Acute kidney injury superimposed on stage 3 chronic kidney disease   Assessment & Plan  Baseline creatinine of approximately 1 3-1 4 -> presented elevated at 2 06 -> improved to 1 89 today  Monitor renal function and urine output -> limit/avoid nephrotoxins and hypotension as possible -> holding HCTZ/ARB -> note chronic diuretic use in the setting of CHF  Monitor hemoglobin and transfuse if necessary in the setting of acute on chronic anemia  If creatinine declines/worsens, consider nephrology evaluation    Acute on chronic diastolic CHF  Assessment & Plan  Last EF of 60% in May 2022 with mild biatrial dilatation, mild-moderate MR, mild ID, moderate TR, and severe pulmonary HTN  Appreciate cardiology input -> oral diuretics transition to IV Bumex BID currently  Low-sodium diet  Monitor/replete serum potassium/magnesium deficiencies if present    Melena  Assessment & Plan  Progressive dark stools starting last week  Worsening anemia noted (see below)  Holding Amitiza/Coumadin  Gastroenterology to follow  Monitor H/H and transfuse if necessary    Acute on chronic anemia  Assessment & Plan  Baseline hemoglobin approximately 9-10 -> currently worsened at 7 1 with associated dark stools  Anticoagulation (Coumadin) remains held  Initiated IV Venofer trial  Will appreciate gastroenterology input    Paroxysmal atrial fibrillation   Assessment & Plan  Controlled on Sotalol  Anticoagulation (Coumadin) on hold pending anemia/melena work-up    Type 2 diabetes mellitus with diabetic neuropathy  Assessment & Plan  Lab Results   Component Value Date    HGBA1C 6 3 (H) 03/01/2022     Hold oral hypoglycemics while hospitalized  Continue SSI coverage per Accu-Cheks  Carbohydrate restricted diet  Hypoglycemia protocol  On Gabapentin for neuropathy    History of AVR  Assessment & Plan  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    Morbid obesity  Assessment & Plan  BMI of 42 20  Lifestyle/diet modifications    History of ventricular tachycardia  Assessment & Plan  S/p ICD     History of venous thromboembolism  Assessment & Plan  Holding anticoagulation (Coumadin) in the setting of melena    Venous ulcer of right ankle  Assessment & Plan  Local wound care    Hyperlipidemia  Assessment & Plan  Continue statin      DVT Prophylaxis: SCDs - holding Coumadin    Patient Centered Rounds:  I have performed bedside rounds and discussed plan of care with nursing today  Discussions with Specialists or Other Care Team Provider:  see above assessments if applicable    Education and Discussions with Family / Patient:  Patient, along with wife, at bedside today    Time Spent for Care:  32 minutes  More than 50% of total time spent on counseling and coordination of care as described above  Current Length of Stay: 1 day(s)  Current Patient Status: Inpatient   Certification Statement:  Patient will continue to require additional hospital stay due to assessments as noted above  Code Status: Level 1 - Full Code        Subjective:     Encountered earlier in the day  Remains weak/fatigued  Denies worsening shortness of breath at rest currently  Objective:     Vitals:   Temp (24hrs), Av °F (36 7 °C), Min:97 5 °F (36 4 °C), Max:98 4 °F (36 9 °C)    Temp:  [97 5 °F (36 4 °C)-98 4 °F (36 9 °C)] 98 4 °F (36 9 °C)  HR:  [71-75] 74  Resp:  [16-24] 16  BP: (101-130)/(53-98) 130/67  SpO2:  [93 %-97 %] 97 %  Body mass index is 42 2 kg/m²  Input and Output Summary (last 24 hours):        Intake/Output Summary (Last 24 hours) at 2023 1422  Last data filed at 2023 0900  Gross per 24 hour   Intake 120 ml   Output 400 ml   Net -280 ml       Physical Exam:     GENERAL  obese - weak/fatigued   HEAD Normocephalic - atraumatic   EYES   PERRL - EOMI    MOUTH   Mucosa moist   NECK   Supple - full range of motion   CARDIAC  rate controlled - S1/S2 positive   PULMONARY  mildly diminished at the bases - nonlabored respirations at rest   ABDOMEN   Soft - nontender/nondistended - active bowel sounds   MUSCULOSKELETAL   Motor strength/range of motion deconditioned   NEUROLOGIC   Alert/oriented at baseline   SKIN   Chronic wrinkles/blemishes - right ankle ulceration dressed/bandaged   PSYCHIATRIC   Mood/affect stable         Additional Data:     Labs & Recent Cultures:    Results from last 7 days   Lab Units 05/25/23  0535   EOS PCT % 3   HEMATOCRIT % 24 4*   HEMOGLOBIN g/dL 7 1*   LYMPHS PCT % 21   MONOS PCT % 13*   NEUTROS PCT % 61   PLATELETS Thousands/uL 260   WBC Thousand/uL 5 47     Results from last 7 days   Lab Units 05/25/23  0535 05/24/23  1609   ALK PHOS U/L  --  103   ALT U/L  --  21   AST U/L  --  34   BUN mg/dL 45* 45*   CALCIUM mg/dL 8 8 8 7   CHLORIDE mmol/L 109* 107   CO2 mmol/L 25 24   CREATININE mg/dL 1 89* 2 06*   POTASSIUM mmol/L 4 2 4 3     Results from last 7 days   Lab Units 05/24/23  1609   INR  2 95*     Results from last 7 days   Lab Units 05/25/23  1129 05/25/23  0724 05/24/23  2223   POC GLUCOSE mg/dl 148* 114 127                         Lines/Drains:  Invasive Devices     Peripheral Intravenous Line  Duration           Peripheral IV 05/24/23 Distal;Right;Upper;Ventral (anterior) Arm <1 day                  Last 24 Hours Medication List:   Current Facility-Administered Medications   Medication Dose Route Frequency Provider Last Rate   • acetaminophen  650 mg Oral Q4H PRN Fady Love MD     • albuterol  2 puff Inhalation Q6H PRN Fady Love MD     • atorvastatin  40 mg Oral After Yessica Juan MD     • bumetanide  2 mg Intravenous BID Sammi Mcdonald MD     • fluticasone  2 spray Each Nare Daily PRN Fady Love MD     • gabapentin  100 mg Oral Daily Fady Love MD     • insulin lispro  1-5 Units Subcutaneous HS Dalia Rangel MD     • insulin lispro  1-6 Units Subcutaneous TID AC Emeterio Cifuentes MD     • iron sucrose  200 mg Intravenous Daily Frank Gannon  mg (05/25/23 1050)   • ondansetron  4 mg Intravenous Q6H PRN Dalia Rangel MD     • pantoprazole  40 mg Oral Early Morning Dalia Rangel MD     • potassium chloride  20 mEq Oral Daily Dalia Rangel MD     • sotalol  80 mg Oral Daily Dalia Rangel MD       Facility-Administered Medications Ordered in Other Encounters   Medication Dose Route Frequency Provider Last Rate   • cefTRIAXone  2,000 mg Intravenous Once Brayn Deleon MD     • sodium chloride  75 mL/hr Intravenous Continuous Bryan Deleon MD 75 mL/hr (03/08/23 1000)                    ** Please Note: This note is constructed using a voice recognition dictation system  An occasional wrong word/phrase or “sound-a-like” substitution may have been picked up by dictation device due to the inherent limitations of voice recognition software  Read the chart carefully and recognize, using reasonable context, where substitutions may have occurred  **

## 2023-05-25 NOTE — CASE MANAGEMENT
Case Management Assessment & Discharge Planning Note    Patient name Janina Bernstein  Location [address] [place] [address] [address]/PPHP [id number] MRN [id number]  : 1935 Date 2023       Current Admission Date: 2023  Current Admission Diagnosis:Acute kidney injury [facility])   Patient Active Problem List    Diagnosis Date Noted   • Dark stools 2023   • Extrahepatic biloma 2023   • Pneumonia of both lungs due to infectious organism 2023   • COVID-19 2023   • Pleural effusion on right 2023   • Calculus of gallbladder 2023   • Cholecystostomy care ([facility] ) 2022   • Gout 2022   • Acute on chronic cholecystitis 2022   • Acute cholecystitis 2022   • Benign prostatic hyperplasia with lower urinary tract symptoms 2022   • OBINNA (obstructive sleep apnea)    • CKD (chronic kidney disease) stage 3, GFR 30-59 ml/min ([facility] ) 2022   • Acute kidney injury ([facility] ) 2021   • Osteoarthritis, knee 11/15/2021   • Status post right knee replacement 11/15/2021   • Mixed hyperlipidemia 2021   • Swelling of limb 2021   • Chronic deep vein thrombosis (DVT) ([facility] ) 09/10/2020   • Secondary lymphedema 2018   • Venous ulcer of ankle, right ([facility] ) 2018   • S/P AVR 2018   • Thrombophlebitis 2018   • Presence of implantable cardioverter-defibrillator (ICD) 2018   • Chronic bilateral low back pain without sciatica 2017   • BPH (benign prostatic hyperplasia) 2017   • Obesity, unspecified obesity severity, unspecified obesity type 2017   • Hydrocele 2017   • Chronic anticoagulation 2017   • Anemia 2017   • Renal cyst 2017   • 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 2015   • Ventricular tachycardia 04/15/2014   • Paroxysmal atrial fibrillation ([address] ) 2014   • Edema 2014   • Chronic diastolic CHF (congestive heart failure) ([facility] ) 2014   • Endocarditis 2014   • Type 2 diabetes mellitus with diabetic neuropathy, without long-term current use of insulin ([facility] ) 08/19/2013   • Peripheral venous insufficiency 08/19/2013   • Cardiomegaly 08/19/2013   • PVD (peripheral vascular disease) ([facility] ) 04/19/2013   • Dyslipidemia 04/19/2013      LOS (days): 1  Geometric Mean LOS (GMLOS) (days): 3 10  Days to GMLOS:2 4     OBJECTIVE:    Risk of Unplanned Readmission Score: 28 3         Current admission status: Inpatient       Preferred Pharmacy:   [place] [id number]  [address] [facility] [address]  Phone: [phone] Fax: [phone]    Primary Care Provider: Dayna Aguirre DO    Primary Insurance: MEDICARE  Secondary Insurance: [employer] HE[employer] OPTIONS PROGRAM    ASSESSMENT:  Active Health Care Proxies     [place] [address] Representative - Daughter   Primary Phone: [phone] (Mobile)               Advance Directives  Does patient have a [address]?: Yes  Does patient have Advance Directives?: Yes  Advance Directives: Living will, Power of  for health care  Primary Contact: Roberto diallo POA    Patient Information  Admitted from[de-identified] Home  Mental Status: Alert  During Assessment patient was accompanied by: Spouse  Assessment information provided by[de-identified] Patient  Primary Caregiver: Self  Support Systems: Self, Spouse/significant other  [place] of Residence: [address] [place] [address] do you live in?: [facility] entry access options   Select all that apply : No steps to enter home  Type of Current Residence: [place]  In the last 12 months, was there a time when you were not able to pay the mortgage or rent on time?: No  In the last 12 months, how many places have you lived?: 1  In the last 12 months, was there a time when you did not have a steady place to sleep or slept in a shelter (including now)?: No  Homeless/housing insecurity resource given?: N/A  Living Arrangements: Lives w/ Spouse/significant other, Other (Comment) (Daughter, NABOR, and 20 yr grandchild [facility]  Is patient a ?: No    Activities of Daily Living Prior to Admission  Functional Status: Independent  Completes ADLs independently?: Yes  Ambulates independently?: Yes  Does patient currently own DME?: Yes  What DME does the patient currently own?: Yisel Jean, Shower Chair, Other (Comment) (grab bars)  Does patient have a history of Outpatient Therapy (PT/OT)?: Yes  Does the patient have a history of Short-Term Rehab?: No  Does patient have a history of HHC?: No  Does patient currently have [address]?: No    Patient Information Continued  Income Source: Pension/FPC  Does patient have prescription coverage?: Yes  Within the past 12 months, you worried that your food would run out before you got the money to buy more : Never true  Within the past 12 months, the food you bought just didn't last and you didn't have money to get more : Never true  Food insecurity resource given?: N/A  Does patient receive dialysis treatments?: No  Does patient have a history of substance abuse?: No  Does patient have a history of Mental Health Diagnosis?: No    Means of Transportation  Means of Transport to Appts[de-identified] Drives Self  In the past 12 months, has lack of transportation kept you from medical appointments or from getting medications?: No  In the past 12 months, has lack of transportation kept you from meetings, work, or from getting things needed for daily living?: No  Was application for public transport provided?: N/A        DISCHARGE DETAILS:    Discharge planning discussed with[de-identified] Patient  Freedom of Choice: Yes  Comments - Freedom of Choice: Discussed FOC  CM contacted family/caregiver?: Yes     CM reviewed d/c planning process including the following: identifying help at home, patient preference for d/c planning needs, Discharge Lounge, [facility] Meds to Bed program, availability of treatment team to discuss questions or concerns patient and/or family may have regarding understanding medications and recognizing signs and symptoms once discharged  CM also encouraged patient to follow up with all recommended appointments after discharge  Patient advised of importance for patient and family to participate in managing patient’s medical well being  Information obtained from patient and spouse at bedside  Lives in a 1st floor apartment with wife, no RADHA   IADLS, has walker, cane, grab bars, and SC at home, does not routinely use walker or cane for ambulation    Vaccinated for covid, denies falls in past 3 months

## 2023-05-25 NOTE — ASSESSMENT & PLAN NOTE
Lab Results   Component Value Date    HGBA1C 6 3 (H) 03/01/2022     Hold oral hypoglycemics while hospitalized  Continue SSI coverage per Accu-Cheks  Carbohydrate restricted diet  Hypoglycemia protocol  On Gabapentin for neuropathy

## 2023-05-25 NOTE — ASSESSMENT & PLAN NOTE
Last EF of 60% in May 2022 with mild biatrial dilatation, mild-moderate MR, mild [place], moderate TR, and severe pulmonary HTN  Appreciate cardiology input -> oral diuretics transition to IV Bumex BID currently  Low-sodium diet  Monitor/replete serum potassium/magnesium deficiencies if present

## 2023-05-25 NOTE — CONSULTS
Consultation - [address] [place] [address] 81 y o  male MRN: [id number]  Unit/Bed#: [facility] 813-01 Encounter: [id number]    Assessment:  Pressure ulcer of the sacral region, unstageable   Pressure ulcer of the right thigh, unstageable   Pressure ulcer of the left thigh, unstageable   Chronic venous hypertension with ulcer of the right lower extremity   Non-pressure ulcer of the right lower extremity with fat layer exposed   Intertrigo   Ambulatory dysfunction   Obesity   Type 2 diabetes without long term use of insulin      Plan:  • Unstageable pressure injuries to the b/l sacro-buttocks & posterior thighs - POA  · Will recommend triad paste to the wound due to location   · Pressure relief   · Will recommend p-500 mattress  • RLE chronic venous ulcer  · Will recommend to continue with wound management of silver alginate   · Leg elevation and ACE wrap for edema management  · IV diuresis as per cardiology  · MASD/ITD to the R abdominal pannus   · No evidence of fungal component  Will recommend maxorb rope  · No s/s of infection present  · Verbal and written consent obtained for bedside debridement of the wounds  Risks, benefits and alternatives discussed: patient and his wife both verbalized understanding and are agreeable to proceed  · Plan to debride wounds tomorrow morning  Will order topical lidocaine in preparation for this  •  A1C results reviewed with the patient today  • Will recommend preventative nursing skin care measures  • Patient verbalized understanding of plan of care  • Mauro gonzalez wound care team with questions or concerns  • Routine wound care follow-up while admitted  AVS updated  • Follow-up with the [facility] wound care center as an outpatient, ambulatory referral placed  History of Present Illness:  Patient is an 81year old male who presents to the hospital with REMA and dark stools  Patient has a history of - CKD, DVT, OBINNA, CHF, venous ulcer, a-fib, anemia, DM, and HTN   Wound "care consulted for RLE and buttocks wounds  Patient goes to the [facility] wound care center for management of his RLE, current wound management is silver alginate and ACE wraps  Patient seen in bed, alert and oriented x 4, cooperative and agreeable for the assessment  Patient reports that his RLE has been present for \"quite some time\"  Patient feels that his edema may be slightly worse than his baseline  Patient reports that his buttocks wounds are relatively new and began about 2 weeks ago due to spending more time in the recliner chair  Patient reports good appetite  Denies fever, chills, or increased pain/drainage related to the wounds  Subjective:    Review of Systems   Constitutional: Positive for appetite change  Negative for chills and fever  HENT: Negative for congestion and sneezing  Respiratory: Negative for cough  Cardiovascular: Positive for leg swelling  Musculoskeletal: Positive for gait problem  Skin: Positive for wound  Psychiatric/Behavioral: Negative for agitation         Historical Information   Past Medical History:   Diagnosis Date   • Anemia    • Arthritis    • Atrial fibrillation ([address] )    • Basal cell carcinoma 03/22/2022    Tip of Nose   • CHF (congestive heart failure) ([facility])    • Diabetes mellitus (HCC)     Niddm   • DVT (deep vein thrombosis) in pregnancy 1966    not in pregnancy   • DVT (deep venous thrombosis) ([address] ) 1966   • Dyslipidemia    • Encephalopathy acute 11/4/2022   • GERD (gastroesophageal reflux disease)    • Hyperlipidemia    • Hypertension    • Hypomagnesemia 10/19/2022   • Irregular heart beat     Afib   • Morbid obesity due to excess calories ([address] )     Resolved 9/2/2014    • Pulmonary embolism (HCC)    • Sepsis ([address] )    • Squamous cell skin cancer 07/30/2020    Left posterior scalp   • Visual impairment      Past Surgical History:   Procedure Laterality Date   • AORTIC VALVE REPLACEMENT     • CARDIAC DEFIBRILLATOR PLACEMENT  04/2014   • CARDIAC SURGERY  " 02/2014    AVR   • COLONOSCOPY     • INSERT / REPLACE / REMOVE PACEMAKER     • IR ASPIRATION BAKERS CYST  3/8/2023   • IR CHOLECYSTOSTOMY TUBE CHECK/CHANGE/REPOSITION/REINSERTION/UPSIZE  10/13/2022   • IR CHOLECYSTOSTOMY TUBE CHECK/CHANGE/REPOSITION/REINSERTION/UPSIZE  10/19/2022   • IR CHOLECYSTOSTOMY TUBE CHECK/CHANGE/REPOSITION/REINSERTION/UPSIZE  10/27/2022   • IR CHOLECYSTOSTOMY TUBE CHECK/CHANGE/REPOSITION/REINSERTION/UPSIZE  11/7/2022   • IR CHOLECYSTOSTOMY TUBE CHECK/CHANGE/REPOSITION/REINSERTION/UPSIZE  11/10/2022   • IR CHOLECYSTOSTOMY TUBE CHECK/CHANGE/REPOSITION/REINSERTION/UPSIZE  12/1/2022   • IR CHOLECYSTOSTOMY TUBE CHECK/CHANGE/REPOSITION/REINSERTION/UPSIZE  1/6/2023   • IR CHOLECYSTOSTOMY TUBE CHECK/CHANGE/REPOSITION/REINSERTION/UPSIZE  1/24/2023   • IR CHOLECYSTOSTOMY TUBE CHECK/CHANGE/REPOSITION/REINSERTION/UPSIZE  3/15/2023   • IR CHOLECYSTOSTOMY TUBE PLACEMENT  9/1/2022   • IR DRAINAGE TUBE CHECK AND/OR REMOVAL  3/22/2023   • IR DRAINAGE TUBE CHECK AND/OR REMOVAL  4/10/2023   • IR DRAINAGE TUBE PLACEMENT  4/6/2023   • JOINT REPLACEMENT Left     LTKR   • MOHS SURGERY  07/30/2020    Left posterior scalp, Dr Samantha Escobar   • [address]  04/18/2022    [address] Tip of Nose- Dr Samantha Escobar   • Litzy Rubinstein DIVJ&STRIP LONG SAPH SAPHFEM Sherrye Drought Right 8/17/2018    Procedure: LEG PERFORATED INJECTION SCLEROTHERAPY;  Surgeon: Jose Raul Hodge MD;  Location: [facility]  MAIN [facility];  Service: Vascular   • REPLACEMENT TOTAL KNEE Right    • TOTAL KNEE ARTHROPLASTY Left    • VASCULAR SURGERY     • VENA CAVA FILTER PLACEMENT      Interruption inferior vena cava, Josue filter, placement   • WISDOM TOOTH EXTRACTION       Social History   Social History     Substance and Sexual Activity   Alcohol Use Not Currently    Comment: no alcohol in 28 yrs     Social History     Substance and Sexual Activity   Drug Use Never     E-Cigarette/Vaping   • E-Cigarette Use Never User      E-Cigarette/Vaping Substances   • Nicotine No    • THC No    • CBD No    • Other No    • Unknown No      Social History     Tobacco Use   Smoking Status Never   Smokeless Tobacco Never     Family History:   Family History   Problem Relation Age of Onset   • Arthritis Mother    • Stroke Mother    • Arthritis Father    • Cancer Father    • Arthritis Daughter        Meds/Allergies   current meds:   Current Facility-Administered Medications   Medication Dose Route Frequency   • acetaminophen (TYLENOL) tablet 650 mg  650 mg Oral Q4H PRN   • albuterol (PROVENTIL HFA,VENTOLIN HFA) inhaler 2 puff  2 puff Inhalation Q6H PRN   • atorvastatin (LIPITOR) tablet 40 mg  40 mg Oral After Dinner   • bumetanide (BUMEX) injection 2 mg  2 mg Intravenous BID   • fluticasone (FLONASE) 50 mcg/act nasal spray 2 spray  2 spray Each Nare Daily PRN   • gabapentin (NEURONTIN) capsule 100 mg  100 mg Oral Daily   • insulin lispro (HumaLOG) 100 units/mL subcutaneous injection 1-5 Units  1-5 Units Subcutaneous HS   • insulin lispro (HumaLOG) 100 units/mL subcutaneous injection 1-6 Units  1-6 Units Subcutaneous TID AC   • iron sucrose (VENOFER) 200 mg in sodium chloride 0 9 % 100 mL IVPB  200 mg Intravenous Daily   • [START ON 5/26/2023] lidocaine (LMX) 4 % cream   Topical Once   • ondansetron (ZOFRAN) injection 4 mg  4 mg Intravenous Q6H PRN   • pantoprazole (PROTONIX) EC tablet 40 mg  40 mg Oral Early Morning   • potassium chloride (K-DUR,KLOR-CON) CR tablet 20 mEq  20 mEq Oral Daily   • sotalol (BETAPACE) tablet 80 mg  80 mg Oral Daily     Facility-Administered Medications Ordered in Other Encounters   Medication Dose Route Frequency   • cefTRIAXone (ROCEPHIN) 2,000 mg in dextrose 5 % 50 mL IVPB  2,000 mg Intravenous Once   • sodium chloride 0 9 % infusion  75 mL/hr Intravenous Continuous     Allergies   Allergen Reactions   • Tramadol Other (See Comments)     intolerance       Objective   Vitals: Blood pressure 130/67, pulse 74, temperature 98 4 °F (36 9 °C), resp   rate 16, weight (!) 145 kg (319 lb 14 2 oz), SpO2 97 %  Wounds:     Wound 23 Ankle Right;Medial (Active)   Wound Image       23 0915   Wound Length (cm) 14 cm 23 0915   Wound Width (cm) 8 cm 23 0915   Wound Depth (cm) 0 3 cm 23 0915   Wound 23 Buttocks (Active)   Wound Image       23 0918   Wound Length (cm) 12 cm 23 0918   Wound Width (cm) 11 cm 23 0918   Wound Depth (cm) 0 2 cm 23 0918   Wound 23 Other (comment) Thigh Anterior;Proximal;Right (Active)   Wound Image       23 0917   Wound Length (cm) 1 cm 23 0917   Wound Width (cm) 3 cm 23 0917   Wound Depth (cm) 0 1 cm 23 9207         Physical Exam  Constitutional:       General: He is awake  He is not in acute distress  Appearance: He is morbidly obese  He is not diaphoretic  HENT:      Head: Normocephalic and atraumatic  Right Ear: External ear normal       Left Ear: External ear normal    Eyes:      Conjunctiva/sclera: Conjunctivae normal    Cardiovascular:      Pulses:           Dorsalis pedis pulses are 1+ on the right side and 1+ on the left side  Posterior tibial pulses are 1+ on the right side and 1+ on the left side  Pulmonary:      Effort: Pulmonary effort is normal  No respiratory distress  Genitourinary:     Comments: Continent of bowel and bladder  Musculoskeletal:      Right lower le+ Edema present  Left lower le+ Edema present  Comments: Max assist of one with turning for the assessment  Assist with care  Foam wedges are in use for repositioning  Skin:     General: Skin is warm and dry  Findings: Wound present  No erythema or rash  Comments: 1  B/l distal buttocks/thighs with unstageable pressure injury - presents as full thickness scattered wounds, measured together  Wound beds are approx: 90% adhered moist yellow slough and 10% moist pink/red tissue  Edges fragile and attached without maceration   Small amount of serous sanguineous drainage  Deedee-wounds are dry and intact with blanchable hyperpigmented and pink colored skin  2  R medial lower extremity venous ulcer - presents as irregular shaped/scattered full thickness wounds measured together  Wound beds are approx: 95% moist yellow slough and 5% moist red/pink tissue  Moderate amount of yellow and serous sanguineous drainage  Edges fragile and attached without maceration  Deedee-wound is intact with dry scaly hemosiderin staining and scar tissue  3  R abdominal pannus with scattered irregular shaped partial thickness wounds related to MASD/ITD, measured together with mixed pink/red and yellow tissue  Located in deep skin fold  Small amount of serous sangineous drainage  Edges fragile and attached without maceration  Deedee-wound is dry and intact  4  B/l heels are dry and intact without redness or wounds  No induration, fluctuance, odor, warmth/temperature differences, redness, or purulence noted to the above mentioned wounds and skin areas assessed  New dressings applied as noted above  Patient tolerated assessment well- denies pain and no s/s of non-verbal pain or discomfort observed during the encounter  Neurological:      Mental Status: He is alert and oriented to person, place, and time  Gait: Gait abnormal    Psychiatric:         Mood and Affect: Mood normal          Behavior: Behavior normal  Behavior is cooperative  Lab, Imaging and other studies: I have personally reviewed pertinent reports  Code Status: Level 1 - Full Code      Counseling / Coordination of Care  Total time spent today:    Total time (face-to-face and non-face-to-face) spent on today's visit was 25 minutes   This includes preparation for the visits (H&P on 5/24/23, A1c on 3/1/23, cardiology note on 5/25/23, internal medicine note on 5/25/23, outpatient wound center visit on 5/23/23, and MARQUIS on 3/1/23) performance of a medically appropriate history and examination, and "orders for medications/treatments or testing  Discussed assessment findings, and plan of care/recommendations with patients RN  Amanda Bean, ODESSA, CWON      Portions of the record may have been created with voice recognition software  Occasional wrong word or \"sound a like\" substitutions may have occurred due to the inherent limitations of voice recognition software    Read the chart carefully and recognize, using context, where substitutions have occurred      "

## 2023-05-26 ENCOUNTER — ANESTHESIA (INPATIENT)
Dept: GASTROENTEROLOGY | Facility: HOSPITAL | Age: 88
End: 2023-05-26

## 2023-05-26 ENCOUNTER — ANESTHESIA EVENT (INPATIENT)
Dept: GASTROENTEROLOGY | Facility: HOSPITAL | Age: 88
End: 2023-05-26

## 2023-05-26 ENCOUNTER — APPOINTMENT (INPATIENT)
Dept: GASTROENTEROLOGY | Facility: HOSPITAL | Age: 88
End: 2023-05-26

## 2023-05-26 LAB
ANION GAP SERPL CALCULATED.3IONS-SCNC: 4 MMOL/L (ref 4–13)
BASOPHILS # BLD AUTO: 0.04 THOUSANDS/ÂΜL (ref 0–0.1)
BASOPHILS NFR BLD AUTO: 1 % (ref 0–1)
BUN SERPL-MCNC: 43 MG/DL (ref 5–25)
CALCIUM SERPL-MCNC: 8.8 MG/DL (ref 8.3–10.1)
CHLORIDE SERPL-SCNC: 110 MMOL/L (ref 96–108)
CO2 SERPL-SCNC: 23 MMOL/L (ref 21–32)
CREAT SERPL-MCNC: 1.81 MG/DL (ref 0.6–1.3)
EOSINOPHIL # BLD AUTO: 0.28 THOUSAND/ÂΜL (ref 0–0.61)
EOSINOPHIL NFR BLD AUTO: 5 % (ref 0–6)
ERYTHROCYTE [DISTWIDTH] IN BLOOD BY AUTOMATED COUNT: 20 % (ref 11.6–15.1)
GFR SERPL CREATININE-BSD FRML MDRD: 33 ML/MIN/1.73SQ M
GLUCOSE SERPL-MCNC: 101 MG/DL (ref 65–140)
GLUCOSE SERPL-MCNC: 110 MG/DL (ref 65–140)
GLUCOSE SERPL-MCNC: 112 MG/DL (ref 65–140)
GLUCOSE SERPL-MCNC: 114 MG/DL (ref 65–140)
HCT VFR BLD AUTO: 23.8 % (ref 36.5–49.3)
HGB BLD-MCNC: 7.3 G/DL (ref 12–17)
IMM GRANULOCYTES # BLD AUTO: 0.05 THOUSAND/UL (ref 0–0.2)
IMM GRANULOCYTES NFR BLD AUTO: 1 % (ref 0–2)
INR PPP: 2.57 (ref 0.84–1.19)
LYMPHOCYTES # BLD AUTO: 1.14 THOUSANDS/ÂΜL (ref 0.6–4.47)
LYMPHOCYTES NFR BLD AUTO: 21 % (ref 14–44)
MCH RBC QN AUTO: 28.9 PG (ref 26.8–34.3)
MCHC RBC AUTO-ENTMCNC: 30.7 G/DL (ref 31.4–37.4)
MCV RBC AUTO: 94 FL (ref 82–98)
MONOCYTES # BLD AUTO: 0.57 THOUSAND/ÂΜL (ref 0.17–1.22)
MONOCYTES NFR BLD AUTO: 10 % (ref 4–12)
NEUTROPHILS # BLD AUTO: 3.43 THOUSANDS/ÂΜL (ref 1.85–7.62)
NEUTS SEG NFR BLD AUTO: 62 % (ref 43–75)
NRBC BLD AUTO-RTO: 0 /100 WBCS
PLATELET # BLD AUTO: 263 THOUSANDS/UL (ref 149–390)
PMV BLD AUTO: 9.3 FL (ref 8.9–12.7)
POTASSIUM SERPL-SCNC: 3.8 MMOL/L (ref 3.5–5.3)
PROTHROMBIN TIME: 27.8 SECONDS (ref 11.6–14.5)
RBC # BLD AUTO: 2.53 MILLION/UL (ref 3.88–5.62)
SODIUM SERPL-SCNC: 137 MMOL/L (ref 135–147)
WBC # BLD AUTO: 5.51 THOUSAND/UL (ref 4.31–10.16)

## 2023-05-26 PROCEDURE — 0JBN0ZZ EXCISION OF RIGHT LOWER LEG SUBCUTANEOUS TISSUE AND FASCIA, OPEN APPROACH: ICD-10-PCS | Performed by: FAMILY MEDICINE

## 2023-05-26 PROCEDURE — 0JB70ZZ EXCISION OF BACK SUBCUTANEOUS TISSUE AND FASCIA, OPEN APPROACH: ICD-10-PCS | Performed by: FAMILY MEDICINE

## 2023-05-26 PROCEDURE — 0D598ZZ DESTRUCTION OF DUODENUM, VIA NATURAL OR ARTIFICIAL OPENING ENDOSCOPIC: ICD-10-PCS | Performed by: INTERNAL MEDICINE

## 2023-05-26 RX ORDER — WARFARIN SODIUM 2 MG/1
4 TABLET ORAL
Status: DISCONTINUED | OUTPATIENT
Start: 2023-05-26 | End: 2023-05-29 | Stop reason: HOSPADM

## 2023-05-26 RX ORDER — PROPOFOL 10 MG/ML
INJECTION, EMULSION INTRAVENOUS AS NEEDED
Status: DISCONTINUED | OUTPATIENT
Start: 2023-05-26 | End: 2023-05-26

## 2023-05-26 RX ORDER — SODIUM HYPOCHLORITE 1.25 MG/ML
SOLUTION TOPICAL DAILY
Status: DISCONTINUED | OUTPATIENT
Start: 2023-05-26 | End: 2023-05-29 | Stop reason: HOSPADM

## 2023-05-26 RX ORDER — WARFARIN SODIUM 3 MG/1
6 TABLET ORAL
Status: DISCONTINUED | OUTPATIENT
Start: 2023-05-27 | End: 2023-05-29 | Stop reason: HOSPADM

## 2023-05-26 RX ORDER — LIDOCAINE HYDROCHLORIDE 10 MG/ML
INJECTION, SOLUTION EPIDURAL; INFILTRATION; INTRACAUDAL; PERINEURAL AS NEEDED
Status: DISCONTINUED | OUTPATIENT
Start: 2023-05-26 | End: 2023-05-26

## 2023-05-26 RX ORDER — EPHEDRINE SULFATE 50 MG/ML
INJECTION INTRAVENOUS AS NEEDED
Status: DISCONTINUED | OUTPATIENT
Start: 2023-05-26 | End: 2023-05-26

## 2023-05-26 RX ADMIN — IRON SUCROSE 200 MG: 20 INJECTION, SOLUTION INTRAVENOUS at 08:46

## 2023-05-26 RX ADMIN — POTASSIUM CHLORIDE 20 MEQ: 750 TABLET, EXTENDED RELEASE ORAL at 08:46

## 2023-05-26 RX ADMIN — PROPOFOL 20 MG: 10 INJECTION, EMULSION INTRAVENOUS at 14:17

## 2023-05-26 RX ADMIN — BUMETANIDE 2 MG: 0.25 INJECTION INTRAMUSCULAR; INTRAVENOUS at 10:10

## 2023-05-26 RX ADMIN — SOTALOL HYDROCHLORIDE 80 MG: 80 TABLET ORAL at 08:46

## 2023-05-26 RX ADMIN — SODIUM CHLORIDE: 0.9 INJECTION, SOLUTION INTRAVENOUS at 13:58

## 2023-05-26 RX ADMIN — PROPOFOL 40 MG: 10 INJECTION, EMULSION INTRAVENOUS at 14:04

## 2023-05-26 RX ADMIN — PANTOPRAZOLE SODIUM 40 MG: 40 TABLET, DELAYED RELEASE ORAL at 04:56

## 2023-05-26 RX ADMIN — EPHEDRINE SULFATE 5 MG: 50 INJECTION INTRAVENOUS at 14:04

## 2023-05-26 RX ADMIN — PROPOFOL 20 MG: 10 INJECTION, EMULSION INTRAVENOUS at 14:10

## 2023-05-26 RX ADMIN — EPHEDRINE SULFATE 5 MG: 50 INJECTION INTRAVENOUS at 14:17

## 2023-05-26 RX ADMIN — GABAPENTIN 100 MG: 100 CAPSULE ORAL at 08:46

## 2023-05-26 RX ADMIN — BUMETANIDE 2 MG: 0.25 INJECTION INTRAMUSCULAR; INTRAVENOUS at 17:34

## 2023-05-26 RX ADMIN — ATORVASTATIN CALCIUM 40 MG: 40 TABLET, FILM COATED ORAL at 17:34

## 2023-05-26 RX ADMIN — EPHEDRINE SULFATE 5 MG: 50 INJECTION INTRAVENOUS at 14:08

## 2023-05-26 RX ADMIN — WARFARIN SODIUM 4 MG: 2 TABLET ORAL at 21:01

## 2023-05-26 RX ADMIN — PROPOFOL 30 MG: 10 INJECTION, EMULSION INTRAVENOUS at 14:07

## 2023-05-26 RX ADMIN — PROPOFOL 20 MG: 10 INJECTION, EMULSION INTRAVENOUS at 14:13

## 2023-05-26 RX ADMIN — LIDOCAINE HYDROCHLORIDE 80 MG: 10 INJECTION, SOLUTION EPIDURAL; INFILTRATION; INTRACAUDAL; PERINEURAL at 14:04

## 2023-05-26 RX ADMIN — EPHEDRINE SULFATE 5 MG: 50 INJECTION INTRAVENOUS at 14:13

## 2023-05-26 RX ADMIN — Medication 40 MG: at 14:11

## 2023-05-26 NOTE — PROGRESS NOTES
Per cardiology, the patient needs to have strict I/O's documented  However, the patient states it is too difficult for him to use the urinal  Collection hats placed into the patients toilet for future urine output  If accurate I/O's are unable to be obtained, nursing is to insert a alfred catheter  No further orders at this time  Qbrexza Pregnancy And Lactation Text: There is no available data on Qbrexza use in pregnant women.  There is no available data on Qbrexza use in lactation.

## 2023-05-26 NOTE — PROGRESS NOTES
"Bedside Debridement -  Wound Care  Judge Tovar 81 y o  male MRN: [id number]  [facility] Encounter: [id number]      Assessment:  Pressure ulcer of the sacral region, unstageable   Pressure ulcer of the right thigh, unstageable   Pressure ulcer of the left thigh, unstageable   Chronic venous hypertension with ulcer of the right lower extremity   Non-pressure ulcer of the right lower extremity with fat layer exposed   Ambulatory dysfunction   Obesity   Type 2 diabetes without long term use of insulin      Plan:  • Wounds surgically debrided at the bedside today  • Patient tolerated procedure well  • No s/s of infection present  • Recommend to continue with current wound management plan in place, including preventative skin care measures - refer to previous progress note for details  • Patient verbalized understanding of plan of care         Subjective:  Wound care to see patient for bedside debridement  Patient seen in bed, alert and oriented x 4  Patient is agreeable to proceed with the procedure  All questions/concerns answered  Vitals: Blood pressure 114/54, pulse 70, temperature 97 7 °F (36 5 °C), temperature source Oral, resp  rate 20, height 6' 1\" (1 854 m), weight (!) 145 kg (320 lb), SpO2 99 %  ,Body mass index is 42 22 kg/m²  Universal Protocol:  Consent: Verbal and written consent obtained  Risks and Benefits: risks, benefits, and alternatives were discussed  Consent given by: patient   Time out: Immediately prior to procedure a \"time out\" was called to verify the correct patient, procedure, equipment, support staff and site/side marked as required  Time out called at: 1000  Patient understanding: patient states understanding of the procedure being performed  Patient identity confirmed: verbally with patient, via armband          Procedure 1:  Wound: RLE  Performed by: NP  Debridement type: surgical  Debridement level:subcutaneous tissue  Pain control: topical lidocaine 4%   " Adequate analgesia in the area to be debrided was verified  Pre-debridement measurements  Length (cm): 14 0  Width (cm): 5 0  Depth (cm): 0 2     Post-debridement measurements:   Length (cm): 14 0  Width (cm): 5 0  Depth (cm): 0 3  Percentage of wound debrided: 100  Tissue and other material debrided:  subcutaneous tissue  Devitalized tissue debrided: biofilm, slough, exudate  Instruments utilized: curette  Bleeding amount: moderate  Hemostasis obtained with: pressure   Procedural pain level (0-10): 0  Post-procedural pain level (0-10): 0  Response to treatment: patient tolerated procedure well without immediate complications, and adequate hemostasis was achieved at the completion of the procedure       Pre debridement image:              Post debridement image:              Dressing applied after procedure: silver alginate, ABD pad, juanjo, ace wraps             Procedure 2:  Wound: b/l sacro-buttocks/posterior thighs  Performed by: NP  Debridement type: surgical  Debridement level: subcutaneous tissue  Pain control: topical lidocaine 4%  Adequate analgesia in the area to be debrided was verified     Pre-debridement measurements  Length (cm): 12 0  Width (cm): 11 0  Depth (cm): 0 2     Post-debridement measurements:   Length (cm): 12 0  Width (cm): 11 0  Depth (cm): 0 3  Percentage of wound debrided: 50  Tissue and other material debrided:  subcutaneous tissue and dermia  Devitalized tissue debrided: slough and exudate  Instruments utilized: curette  Bleeding amount: moderate  Hemostasis obtained with: pressure   Procedural pain level (0-10): 2  Post-procedural pain level (0-10): 10  Response to treatment: patient tolerated procedure well without immediate complications, and adequate hemostasis was achieved at the completion of the procedure       Pre debridement image:           Post debridement image:              Dressing applied after procedure: triad paste      ODESSA Caballero, CWON      Portions of "the record may have been created with voice recognition software  Occasional wrong word or \"sound a like\" substitutions may have occurred due to the inherent limitations of voice recognition software    Read the chart carefully and recognize, using context, where substitutions have occurred  "

## 2023-05-26 NOTE — PROGRESS NOTES
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, Ventricular tachycardia s/p MDT-DC ICD, Diastolic CHF (LVEF 41% in 2022), pHTN with RVSP 70 mmHg and Moderately reduced RV function who presents from the outpatient cardiology office with complaints of exertional dyspnea  Recently had cholecystostomy tube removed after she was treated for abscess 3 weeks ago  Developed dark stool started on 5/19/23  Went to ED - INR elevated, anemic (Hgb baseline 9-10 but down to 7's) and worsening renal function  In the outpatient visit she was noted to be hypotensive (98/58 mmHg)  Creatinine also elevated from a baseline of 1 3 to 2 06  Patient sent in for signs of significant weight gain and volume overload as well as hypotension  Vitals: Blood pressure 113/54, pulse 62, temperature 98 3 °F (36 8 °C), resp  rate 17, height 6' 1\" (1 854 m), weight (!) 145 kg (320 lb), SpO2 100 %  , Body mass index is 42 22 kg/m² ,   Orthostatic Blood Pressures      Flowsheet Row Most Recent Value   Blood Pressure 113/54 filed at 05/26/2023 9575   Patient Position - Orthostatic VS Lying filed at 05/24/2023 1955              Intake/Output Summary (Last 24 hours) at 5/26/2023 0804  Last data filed at 5/25/2023 1832  Gross per 24 hour   Intake 140 ml   Output --   Net 140 ml       Review of System:  Review of system was conducted and was negative except for as stated in the subjective course      Physical Exam:    GEN: Collette Dong appears well, alert and oriented x 3, pleasant " and cooperative   Appears grossly volume overloaded  HEENT:  Normocephalic, atraumatic, anicteric, moist mucous membranes  NECK: No JVD or carotid bruits   HEART: reg rhythm, reg rate, normal S1 and S2, no murmurs, clicks, gallops or rubs   LUNGS: crackles in b/l lung fields up to mid-levels  ABDOMEN:  Normoactive bowel sounds, soft, no tenderness, no distention  EXTREMITIES: peripheral pulses palpable; 3+ tense edema  NEURO: no gross focal findings; cranial nerves grossly intact   SKIN:  Dry, intact, warm to touch      Current Facility-Administered Medications:     acetaminophen (TYLENOL) tablet 650 mg, 650 mg, Oral, Q4H PRN, Emeterio Cifuentes MD    albuterol (PROVENTIL HFA,VENTOLIN HFA) inhaler 2 puff, 2 puff, Inhalation, Q6H PRN, Jayla Lebron MD    atorvastatin (LIPITOR) tablet 40 mg, 40 mg, Oral, After Britney Johnston MD, 40 mg at 05/25/23 1737    bumetanide (BUMEX) injection 2 mg, 2 mg, Intravenous, BID, Alyssa Matthews MD, 2 mg at 05/25/23 1738    fluticasone (FLONASE) 50 mcg/act nasal spray 2 spray, 2 spray, Each Nare, Daily PRN, Jayla Lebron MD    gabapentin (NEURONTIN) capsule 100 mg, 100 mg, Oral, Daily, Emeterio Cifuentes MD, 100 mg at 05/25/23 0915    insulin lispro (HumaLOG) 100 units/mL subcutaneous injection 1-5 Units, 1-5 Units, Subcutaneous, HS, Emeterio Cifuentes MD    insulin lispro (HumaLOG) 100 units/mL subcutaneous injection 1-6 Units, 1-6 Units, Subcutaneous, TID AC **AND** Fingerstick Glucose (POCT), , , TID AC, Emeterio Cifuentes MD    iron sucrose (VENOFER) 200 mg in sodium chloride 0 9 % 100 mL IVPB, 200 mg, Intravenous, Daily, Nelly Garcia MD, Last Rate: 100 mL/hr at 05/25/23 1050, 200 mg at 05/25/23 1050    lidocaine (LMX) 4 % cream, , Topical, Once, [employer], CRNP    ondansetron (ZOFRAN) injection 4 mg, 4 mg, Intravenous, Q6H PRN, Emeterio Cifuentes MD    pantoprazole (PROTONIX) EC tablet 40 mg, 40 mg, Oral, Early Morning, Emeterio Cifuentes MD, 40 mg at 05/26/23 9897    potassium chloride (K-DUR,KLOR-CON) CR tablet 20 mEq, 20 mEq, Oral, Daily, Emeterio Cifuentes MD, 20 mEq at 05/25/23 0915    sotalol (BETAPACE) tablet 80 mg, 80 mg, Oral, Daily, Adolfo Lopes MD, 80 mg at 05/25/23 0915    Facility-Administered Medications Ordered in Other Encounters:     cefTRIAXone (ROCEPHIN) 2,000 mg in dextrose 5 % 50 mL IVPB, 2,000 mg, Intravenous, Once, Michael Alves MD    sodium chloride 0 9 % infusion, 75 mL/hr, Intravenous, Continuous, Michael Alves MD, Last Rate: 75 mL/hr at 03/08/23 1000, 75 mL/hr at 03/08/23 1000    Labs & Results:  Results from last 7 days   Lab Units 05/24/23 2015 05/24/23  1825 05/24/23  1609   HS TNI 0HR ng/L  --   --  12   HS TNI 2HR ng/L  --  11  --    HS TNI 4HR ng/L 11  --   --          Results from last 7 days   Lab Units 05/26/23  0527 05/25/23  0535 05/24/23  1609   BUN mg/dL 43* 45* 45*   CHLORIDE mmol/L 110* 109* 107   CO2 mmol/L 23 25 24   CREATININE mg/dL 1 81* 1 89* 2 06*   POTASSIUM mmol/L 3 8 4 2 4 3     Results from last 7 days   Lab Units 05/26/23  0527 05/25/23  0535 05/24/23  1609   HEMATOCRIT % 23 8* 24 4* 24 0*   HEMOGLOBIN g/dL 7 3* 7 1* 7 5*   PLATELETS Thousands/uL 263 260 256             VTE Prophylaxis: Heparin      Assessment:  Principal Problem:    Acute kidney injury superimposed on stage 3 chronic kidney disease   Active Problems:    Type 2 diabetes mellitus with diabetic neuropathy    Acute on chronic anemia    Paroxysmal atrial fibrillation     History of AVR    History of ventricular tachycardia    Acute on chronic diastolic CHF    Venous ulcer of right ankle    Morbid obesity    Hyperlipidemia    History of venous thromboembolism    Melena    1  Hypotension and REMA Multifactorial Likely 2/2 Congestive HF with component of Acute Blood Loss Anemia in setting of Supratherapeutic INR  --> On 5/19/23 INR was 6 6 and on presentation it was 3  --> Troponin unremarkable  2  Atrial Fibrillation on Warfarin and Sotalol  3  DM2   4  HTN // HLD   5   DVT on Warfarin 6  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   7  Ventricular tachycardia s/p MDT-DC ICD   8  Diastolic CHF (LVEF 03% in 2022)   9  pHTN with RVSP 70 mmHg and Moderately reduced RV function     Plan:  - c/w Lipitor 40mg PO QHS  - c/w Sotalol 80mg PO Daily  - c/w Bumex 2mg IV BID   --> Patient may require a drip if not diuresing adequately  Can also do PRN Metolazone for further augmentation  --> I/O's not adequately documented  Will consider Combs for accurate assessment if unable to capture output  Patient personally endorses voluminous output so it appears this dose may be adequate  - Continue holding HCTZ/ARB for now given REMA  - Resume Coumadin once able as per primary  - Replete electrolytes PRN with Mg/Phos/K goals of 2/3/4 respectively    Case discussed and reviewed with Dr Carlos Willis who agrees with my assessment and plan  Thank you for involving us in the care of your patient  Alfonzo Buitrago MD  Cardiology Fellow PGY-6      Epic/ [employer]/Care Everywhere records reviewed:     ** Please Note: Fluency DirectDictation voice to text software may have been used in the creation of this document   **

## 2023-05-26 NOTE — RESTORATIVE TECHNICIAN NOTE
Restorative Technician Note      Patient Name: Ravindra Del Real     Restorative Tech Visit Date: 05/26/23  Note Type: Mobility  Patient Position Upon Consult: Supine  Activity Performed: Ambulated  Assistive Device: Standard walker  Education Provided: Yes  Patient Position at End of Consult: Bedside chair;  All needs within reach    Anahi HERNANDEZT, Restorative Technician

## 2023-05-26 NOTE — PROGRESS NOTES
Progress Note - Wound   Helio Settler 81 y o  male MRN: [id number]  [facility] Encounter: [id number]      Assessment:  Pressure ulcer of the sacral region, unstageable   Pressure ulcer of the right thigh, unstageable   Pressure ulcer of the left thigh, unstageable   Chronic venous hypertension with ulcer of the right lower extremity   Non-pressure ulcer of the right lower extremity with fat layer exposed   Ambulatory dysfunction   Obesity   Type 2 diabetes without long term use of insulin       Plan:  · Wound are stable in appearance today  • No s/s of infection present  • Pressure injuries to the b/l sacro-buttocks/posterior thighs - POA  o Continue with TRIAD paste  o Pressure relief   o P-500 mattress  • RLE chronic venous ulcer   o Continue with silver alginate and ABD pads  o Leg elevation and ACE wraps for edema management    o IV diuresis as per cardiology   o Will add Dakin's quarter strength to the regimen for the RLE due to mild malodor  Malodor decreased with wound cleansing with NSS at time of the assessment  • Continue with maxorb rope to the R pannus wound  • Recommend to continue with preventative nursing skin care measures  • Patient verbalized understanding of plan of care   • [place] text wound care team with questions or concerns  • Routine wound care follow-up while admitted  AVS updated  • Follow-up with the [facility] wound care center as an outpatient, ambulatory referral placed  Subjective:  Wound care to see patient for follow- up visit of RLE and b/l sacro-buttocks/posterior thigh wounds  Patient seen in bed, alert and oriented x 4  Patient is continent, assist with care, foam wedges are in use for repositioning  Moderate assist of one with turning for the assessment today  Therapy reports low air loss bed has been delivered to the unit today and patient will transferred to it  Patient sates he is NPO for a GI procedure later today   Denies fever, chills, or "increased pain/drainage from the wounds  Objective:      Vitals: Blood pressure 114/54, pulse 70, temperature 97 7 °F (36 5 °C), temperature source Oral, resp  rate 20, height 6' 1\" (1 854 m), weight (!) 145 kg (320 lb), SpO2 99 %  ,Body mass index is 42 22 kg/m²  Focused Physical Exam:  1  B/l distal buttocks/thighs with unstageable pressure injury - presents as full thickness scattered wounds, measured together  Wound beds are approx: 90% adhered moist yellow slough and 10% moist pink/red tissue  Edges fragile and attached without maceration  Small amount of serous sanguineous drainage  Deedee-wounds are dry and intact with blanchable hyperpigmented and pink colored skin  2  R medial lower extremity venous ulcer - presents as irregular shaped/scattered full thickness wounds measured together  Wound beds are approx: 90% moist yellow slough and 10% moist red/pink tissue  Large amount of yellow and serous sanguineous drainage  Edges fragile and attached without maceration  Deedee-wound is intact with dry scaly hemosiderin staining and scar tissue  There no erythema  Mild malodor noted that decreased with wound cleansing  No purulence  3  B/l heels are dry and intact without redness or wounds  No induration, fluctuance, odor, warmth/temperature differences, redness, or purulence noted to the above mentioned wounds and skin areas assessed  New dressings applied as noted above  Patient tolerated assessment well- denies pain and no s/s of non-verbal pain or discomfort observed during the encounter  Lab, Imaging and other studies: I have personally reviewed pertinent reports          Wound 01/13/23 Ankle Right;Medial (Active)   Wound Image       05/26/23 0935   Wound Length (cm) 14 cm 05/26/23 0935   Wound Width (cm) 5 cm 05/26/23 0935   Wound Depth (cm) 0 3 cm 05/26/23 0935   Wound 05/24/23 Buttocks (Active)   Wound Image       05/26/23 0936   Wound Length (cm) 12 cm 05/26/23 0936   Wound Width (cm) " "11 cm 05/26/23 0936   Wound Depth (cm) 0 2 cm 05/26/23 0936           Counseling / Coordination of Care  Total time spent today:    Total time (face-to-face and non-face-to-face) spent on today's visit was 40 minutes  This includes preparation for the visits (SLIM note on 5/25/23, cardiology note on 5/25/23, and previous wound care notes) performance of a medically appropriate history and examination, debridement, and orders for medications/treatments or testing   Discussed assessment findings, and plan of care/recommendations with patients ODESSA Valdez, CWON      Portions of the record may have been created with voice recognition software  Occasional wrong word or \"sound a like\" substitutions may have occurred due to the inherent limitations of voice recognition software    Read the chart carefully and recognize, using context, where substitutions have occurred      "

## 2023-05-26 NOTE — ANESTHESIA POSTPROCEDURE EVALUATION
Post-Op Assessment Note    CV Status:  Stable  Pain Score: 0    Pain management: adequate     Mental Status:  Sleepy and arousable   Hydration Status:  Stable   PONV Controlled:  None   Airway Patency:  Patent      Post Op Vitals Reviewed: Yes            No notable events documented      BP   81/42   Temp      Pulse  78   Resp   14   SpO2   96% room air

## 2023-05-26 NOTE — PROGRESS NOTES
[facility]  Progress Note  Name: Margaret Bryan  MRN: [id number]  Unit/Bed#: [facility] 680-88 I Date of Admission: 5/24/2023   Date of Service: 5/26/2023 I Hospital Day: 2      Assessment & Plan:    * Acute kidney injury superimposed on stage 3 chronic kidney disease   Assessment & Plan  Baseline creatinine of approximately 1 3-1 4 -> presented elevated at 2 06 -> improved to 1 81 today  Monitor renal function and urine output -> limit/avoid nephrotoxins and hypotension as possible -> holding HCTZ/ARB -> note chronic diuretic use in the setting of CHF  Monitor hemoglobin and transfuse if necessary in the setting of acute on chronic anemia  If creatinine declines/worsens, consider nephrology evaluation    Acute on chronic diastolic CHF  Assessment & Plan  Last EF of 60% in May 2022 with mild biatrial dilatation, mild-moderate MR, mild CT, moderate TR, and severe pulmonary HTN  Appreciate cardiology input -> oral diuretics transitioned to IV Bumex BID currently  Low-sodium diet  Monitor/replete serum potassium/magnesium deficiencies if present    Melena  Assessment & Plan  Progressive dark stools starting last week  Worsening anemia noted (see below)  Amitiza held on admission  Appreciate gastroenterology input -> status post EGD with push enteroscopy today revealing an 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 status post APC -> okay to resume anticoagulation  Monitor H/H and transfuse if necessary    Acute on chronic anemia  Assessment & Plan  Baseline hemoglobin approximately 9-10 -> worsened at 7 1 yesterday with associated dark stools -> improved/stable at 7 3 currently  Anticoagulation (Coumadin) resumed -> see results of patient uroscopy above  Continue IV Venofer trial  Gastroenterology following    Paroxysmal atrial fibrillation   Assessment & Plan  Controlled on Sotalol  Anticoagulation (Coumadin) resumed    Type 2 diabetes mellitus with diabetic neuropathy  Assessment & Plan  Lab Results   Component Value Date    HGBA1C 6 3 (H) 2022     Hold oral hypoglycemics while hospitalized  Continue SSI coverage per Accu-Cheks  Carbohydrate restricted diet  Hypoglycemia protocol  On Gabapentin for neuropathy    History of AVR  Assessment & Plan  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    Morbid obesity  Assessment & Plan  BMI of 42 22 currently  Lifestyle/diet modifications    History of ventricular tachycardia  Assessment & Plan  S/p ICD     History of venous thromboembolism  Assessment & Plan  Resumed anticoagulation (Coumadin)    Venous ulcer of right ankle  Assessment & Plan  Local wound care    Hyperlipidemia  Assessment & Plan  Continue statin      DVT Prophylaxis:  resumed Coumadin    Patient Centered Rounds:  I have performed bedside rounds and discussed plan of care with nursing today  Discussions with Specialists or Other Care Team Provider:  see above assessments if applicable    Education and Discussions with Family / Patient:  Patient at bedside - discussed, in detail, with wife at bedside yesterday    Time Spent for Care:  32 minutes  More than 50% of total time spent on counseling and coordination of care as described above  Current Length of Stay: 2 day(s)  Current Patient Status: Inpatient   Certification Statement:  Patient will continue to require additional hospital stay due to assessments as noted above  Code Status: Level 1 - Full Code        Subjective:     Seen and examined earlier today prior to undergoing scoping  Although he remains weak/fatigued, he is in positive and pleasant spirits  Objective:     Vitals:   Temp (24hrs), Av 7 °F (36 5 °C), Min:96 9 °F (36 1 °C), Max:98 3 °F (36 8 °C)    Temp:  [96 9 °F (36 1 °C)-98 3 °F (36 8 °C)] 97 3 °F (36 3 °C)  HR:  [62-71] 67  Resp:  [14-20] 14  BP: ()/(46-56) 110/56  SpO2:  [96 %-100 %] 100 %  Body mass index is 42 22 kg/m²  Input and Output Summary (last 24 hours):        Intake/Output Summary (Last 24 hours) at 5/26/2023 2006  Last data filed at 5/26/2023 1921  Gross per 24 hour   Intake 240 ml   Output 2250 ml   Net -2010 ml       Physical Exam:     GENERAL  obese - remains weak/fatigued   HEAD   Normocephalic - atraumatic   EYES   PERRL - EOMI    MOUTH   Mucosa moist   NECK   Supple - full range of motion   CARDIAC  rate controlled - S1/S2 positive   PULMONARY  mildly diminished at the bases but fairly clear otherwise - nonlabored respirations at rest   ABDOMEN   Soft - nontender/nondistended - active bowel sounds   MUSCULOSKELETAL   Motor strength/range of motion deconditioned   NEUROLOGIC   Alert/oriented at baseline   SKIN   Chronic wrinkles/blemishes - right ankle ulceration dressed/bandaged   PSYCHIATRIC   Mood/affect pleasant         Additional Data:     Labs & Recent Cultures:    Results from last 7 days   Lab Units 05/26/23  0527   EOS PCT % 5   HEMATOCRIT % 23 8*   HEMOGLOBIN g/dL 7 3*   LYMPHS PCT % 21   MONOS PCT % 10   NEUTROS PCT % 62   PLATELETS Thousands/uL 263   WBC Thousand/uL 5 51     Results from last 7 days   Lab Units 05/26/23  0527 05/25/23  0535 05/24/23  1609   ALK PHOS U/L  --   --  103   ALT U/L  --   --  21   AST U/L  --   --  34   BUN mg/dL 43*   < > 45*   CALCIUM mg/dL 8 8   < > 8 7   CHLORIDE mmol/L 110*   < > 107   CO2 mmol/L 23   < > 24   CREATININE mg/dL 1 81*   < > 2 06*   POTASSIUM mmol/L 3 8   < > 4 3    < > = values in this interval not displayed       Results from last 7 days   Lab Units 05/26/23  0527   INR  2 57*     Results from last 7 days   Lab Units 05/26/23  1555 05/26/23  1120 05/26/23  0732 05/25/23  2138 05/25/23  1623 05/25/23  1129 05/25/23  0724 05/24/23  2223   POC GLUCOSE mg/dl 101 114 110 121 132 148* 114 127                         Lines/Drains:  Invasive Devices     Peripheral Intravenous Line  Duration           Peripheral IV 05/26/23 Left Antecubital <1 day                  Last 24 Hours Medication List:   Current Facility-Administered Medications   Medication Dose Route Frequency Provider Last Rate   • acetaminophen  650 mg Oral Q4H PRN Radha Yi MD     • albuterol  2 puff Inhalation Q6H PRN Radha Yi MD     • atorvastatin  40 mg Oral After Giancarlo Corona MD     • bumetanide  2 mg Intravenous BID Stanton Denise MD     • fluticasone  2 spray Each Nare Daily PRN Radha Yi MD     • gabapentin  100 mg Oral Daily Radha Yi MD     • insulin lispro  1-5 Units Subcutaneous HS Radha Yi MD     • insulin lispro  1-6 Units Subcutaneous TID AC Radha Yi MD     • iron sucrose  200 mg Intravenous Daily Mariusz Lindo  mg (05/25/23 1050)   • lidocaine   Topical Once ODESSA Damon     • ondansetron  4 mg Intravenous Q6H PRN Radha Yi MD     • pantoprazole  40 mg Oral Early Morning Radha Yi MD     • potassium chloride  20 mEq Oral Daily Radha Yi MD     • sodium hypochlorite   Irrigation Daily ODESSA Damon     • sotalol  80 mg Oral Daily Radha Yi MD     • warfarin  4 mg Oral Once per day on Sun Mon Wed Fri Mariusz Lindo MD     • [START ON 5/27/2023] warfarin  6 mg Oral Once per day on Tue Thu Sat Mariusz Lindo MD       Facility-Administered Medications Ordered in Other Encounters   Medication Dose Route Frequency Provider Last Rate   • cefTRIAXone  2,000 mg Intravenous Once Adamaris Larose MD     • sodium chloride  75 mL/hr Intravenous Continuous Adamaris Larose MD Stopped (05/26/23 3652)                    ** Please Note: This note is constructed using a voice recognition dictation system  An occasional wrong word/phrase or “sound-a-like” substitution may have been picked up by dictation device due to the inherent limitations of voice recognition software  Read the chart carefully and recognize, using reasonable context, where substitutions may have occurred  **

## 2023-05-26 NOTE — ANESTHESIA PREPROCEDURE EVALUATION
"Procedure:  EGD WITH PUSH ENTEROSCOPY    Relevant Problems   CARDIO   (+) CHF (congestive heart failure) (HCC)   (+) Hyperlipidemia   (+) Paroxysmal atrial fibrillation    (+) Ventricular tachycardia      ENDO   (+) Type 2 diabetes mellitus with diabetic neuropathy      /RENAL   (+) Acute kidney injury superimposed on stage 3 chronic kidney disease    (+) BPH (benign prostatic hyperplasia)   (+) Benign prostatic hyperplasia with lower urinary tract symptoms   (+) CKD (chronic kidney disease) stage 3, GFR 30-59 ml/min (HCC)   (+) Renal cyst      HEMATOLOGY   (+) Acute on chronic anemia      MUSCULOSKELETAL   (+) Chronic bilateral low back pain without sciatica   (+) Gout   (+) Osteoarthritis, knee      NEURO/PSYCH   (+) Chronic bilateral low back pain without sciatica      PULMONARY   (+) Pleural effusion on right   (+) Pneumonia of both lungs due to infectious organism      Digestive   (+) Melena      Other   (+) Chronic anticoagulation   (+) Obesity, unspecified obesity severity, unspecified obesity type        Physical Exam    Airway    Mallampati score: III  TM Distance: >3 FB  Neck ROM: limited     Dental       Cardiovascular      Pulmonary      Other Findings        Anesthesia Plan  ASA Score- 3     Anesthesia Type- IV sedation with anesthesia with ASA Monitors  Additional Monitors:   Airway Plan:     Comment: Gentle sedation  Plan Factors-Exercise tolerance (METS): <4 METS  Chart reviewed  EKG reviewed  Existing labs reviewed  Patient summary reviewed  Patient is not a current smoker  Induction- intravenous  Postoperative Plan-     Informed Consent- Anesthetic plan and risks discussed with patient  I personally reviewed this patient with the CRNA  Discussed and agreed on the Anesthesia Plan with the CRNA  Hayley Anderson           VITALS  /54 (BP Location: Right arm)   Pulse 70   Temp 97 7 °F (36 5 °C) (Oral)   Resp 20   Ht 6' 1\" (1 854 m)   Wt (!) 145 kg (320 lb)   SpO2 99%   " BMI 42 22 kg/m²   BP Readings from Last 3 Encounters:   05/26/23 114/54   05/24/23 98/58   05/23/23 111/56     LABS  Results from Last 12 Months   Lab Units 05/26/23  0527 05/25/23  0535 05/24/23  1609 05/19/23  1449 04/06/23  0441   ANION GAP mmol/L 4 3* 4 8 10   ALBUMIN g/dL  --   --  2 7* 3 3*  --    ALK PHOS U/L  --   --  103 103  --    ALT U/L  --   --  21 13  --    AST U/L  --   --  34 22  --    BUN mg/dL 43* 45* 45* 45* 28*   CALCIUM mg/dL 8 8 8 8 8 7 8 7 8 6   CHLORIDE mmol/L 110* 109* 107 105 106   CO2 mmol/L 23 25 24 25 23   CREATININE mg/dL 1 81* 1 89* 2 06* 2 16* 1 38*   GLUCOSE RANDOM mg/dL 112 106 110 107 133   POTASSIUM mmol/L 3 8 4 2 4 3 5 0 4 4   MAGNESIUM mg/dL  --   --  2 0  --  2 2   PHOSPHORUS mg/dL  --   --  4 0  --  3 8   SODIUM mmol/L 137 137 135 138 139   TOTAL BILIRUBIN mg/dL  --   --  1 53* 1 26*  --      Results from Last 12 Months   Lab Units 05/26/23  0527 05/25/23  0535   HEMATOCRIT % 23 8* 24 4*   HEMOGLOBIN g/dL 7 3* 7 1*   PLATELETS Thousands/uL 263 260   WBC Thousand/uL 5 51 5 47     Results from Last 12 Months   Lab Units 05/26/23  0527 05/24/23  1609 04/05/23  0454 04/04/23  1856   INR  2 57* 2 95*   < > 4 80*   PTT seconds  --  48*  --  53*    < > = values in this interval not displayed  ECG  Encounter Date: 05/24/23   ECG 12 lead   Result Value    Ventricular Rate 71    Atrial Rate 67    OR Interval     QRSD Interval 160    QT Interval 484    QTC Interval 525    P Axis     QRS Axis 135    T Wave Axis -12    Narrative    Normal sinus rhythm with first degree AVB  Left bundle branch block  Abnormal ECG    Confirmed by Scotty Jacobson ([id number]) on 5/24/2023 5:31:47 PM     Results for orders placed or performed in visit on 05/24/23   Cardiac EP device report    Narrative    MDT DUAL CHAMBER ICD/ NOT MRI CONDITIONAL  DEVICE INTERROGATED IN THE [facility] OFFICE  BATTERY VOLTAGE ADEQUATE (1 9 YRS)  AP-5%, -0 1%  ALL LEAD PARAMETERS WITHIN NORMAL LIMITS   NO NEW SIGNIFICANT HIGH RATE EPISODES  WAVELET TEMPLATE COLLECTED  PT TO SEE DR RAMESH TODAY  NORMAL DEVICE FUNCTION  GV         ECHOCARDIOGRAPHY, OTHER CARDIAC TESTING, AND IMAGING  n/a    ANESTHESIA RISK-BENEFIT DISCUSSION  BENEFITS INCLUDE THE FOLLOWING (Lauren Rankin 82 [id number], PMID [id number]): (1) Involvement of a dedicated anesthesia team reduces mortality and morbidity for major surgeries, (2) The team provides as much analgesia/sedation/amnesia/akinesia as is reasonably possible, and (3) The team strives to reduce pain and discomfort as much as reasonably possible  RISKS (AND PLANS TO MITIGATE RISKS) INCLUDES THE FOLLOWING:    Neurologic Risks: IntraOp awareness (Risk is ~1:1,000 - 1:14,000; PMID [id number]), Stroke (Risk ~<0 1-2% for most cases; PMID [id number]), and POCD  Airway and Pulmonary Risks: Dental or mouth injury, throat pain, critical hypoxia, pneumothorax, prolonged intubation, post-op respiratory compromise  • Airway/Intubation risks and information: No prior advanced airway notes in [facility] EMR  • Major ARISCAT risk factors include: Age >80: 1 pt, (Score 0-2= Low risk, 1 6%)  Cardiovascular Risks: Hypotension, arrhythmias, and foster-op cardiac injury (MACE)  In cases where specialized vascular access is needed, then additional risks including bleeding, infection, or injury to adjacent structures  If a bypass circuit is needed then risk of stroke, blood clot, and vasoplegia  • Signs of active, severe cardiac instability: none  • Naga's RCRI score items: CHF  • MACE risk: An RCRI score of 1= 0 6% risk  • Are foster-op or intra-op beta blockers indicated?: no, patient has already taken their beta blocker recently  FEN/GI Risks: Aspiration (Approximately 0 5% risk per the IRIS trial) and PONV (10-80% depending on Apfel criteria)  • Does the patient meet ASA NPO guidelines?: Yes   Adverse drug reaction risks:  Allergic reactions, overdoses, drug-drug interactions, injury to a fetus or  in pregnant or breastfeeding patients, increased risk of injury or accident if performing potentially hazardous tasks before anesthetic medications have been fully excreted/metabolized    • Recent medications relevant to anesthetic plan: See MAR  • Personal or family history of anesthesia complications: no  • Pregnancy Status: Not applicable   Mortality risks associated with anesthesia based on ASA-PS (PMID [id number]): ASA-PS III: Estimated risk 1:3,500

## 2023-05-27 LAB
ABO GROUP BLD: NORMAL
ANION GAP SERPL CALCULATED.3IONS-SCNC: 3 MMOL/L (ref 4–13)
BASOPHILS # BLD AUTO: 0.03 THOUSANDS/ÂΜL (ref 0–0.1)
BASOPHILS NFR BLD AUTO: 1 % (ref 0–1)
BLD GP AB SCN SERPL QL: NEGATIVE
BUN SERPL-MCNC: 38 MG/DL (ref 5–25)
CALCIUM SERPL-MCNC: 8.7 MG/DL (ref 8.3–10.1)
CHLORIDE SERPL-SCNC: 105 MMOL/L (ref 96–108)
CO2 SERPL-SCNC: 29 MMOL/L (ref 21–32)
CREAT SERPL-MCNC: 1.75 MG/DL (ref 0.6–1.3)
EOSINOPHIL # BLD AUTO: 0.22 THOUSAND/ÂΜL (ref 0–0.61)
EOSINOPHIL NFR BLD AUTO: 3 % (ref 0–6)
ERYTHROCYTE [DISTWIDTH] IN BLOOD BY AUTOMATED COUNT: 19.9 % (ref 11.6–15.1)
GFR SERPL CREATININE-BSD FRML MDRD: 34 ML/MIN/1.73SQ M
GLUCOSE SERPL-MCNC: 102 MG/DL (ref 65–140)
GLUCOSE SERPL-MCNC: 117 MG/DL (ref 65–140)
GLUCOSE SERPL-MCNC: 118 MG/DL (ref 65–140)
GLUCOSE SERPL-MCNC: 120 MG/DL (ref 65–140)
GLUCOSE SERPL-MCNC: 125 MG/DL (ref 65–140)
HCT VFR BLD AUTO: 22.3 % (ref 36.5–49.3)
HGB BLD-MCNC: 6.9 G/DL (ref 12–17)
IMM GRANULOCYTES # BLD AUTO: 0.03 THOUSAND/UL (ref 0–0.2)
IMM GRANULOCYTES NFR BLD AUTO: 1 % (ref 0–2)
INR PPP: 2.4 (ref 0.84–1.19)
LYMPHOCYTES # BLD AUTO: 1.05 THOUSANDS/ÂΜL (ref 0.6–4.47)
LYMPHOCYTES NFR BLD AUTO: 16 % (ref 14–44)
MAGNESIUM SERPL-MCNC: 1.7 MG/DL (ref 1.6–2.6)
MCH RBC QN AUTO: 29.2 PG (ref 26.8–34.3)
MCHC RBC AUTO-ENTMCNC: 30.9 G/DL (ref 31.4–37.4)
MCV RBC AUTO: 95 FL (ref 82–98)
MONOCYTES # BLD AUTO: 0.75 THOUSAND/ÂΜL (ref 0.17–1.22)
MONOCYTES NFR BLD AUTO: 12 % (ref 4–12)
NEUTROPHILS # BLD AUTO: 4.45 THOUSANDS/ÂΜL (ref 1.85–7.62)
NEUTS SEG NFR BLD AUTO: 67 % (ref 43–75)
NRBC BLD AUTO-RTO: 0 /100 WBCS
PLATELET # BLD AUTO: 252 THOUSANDS/UL (ref 149–390)
PMV BLD AUTO: 9.5 FL (ref 8.9–12.7)
POTASSIUM SERPL-SCNC: 3.3 MMOL/L (ref 3.5–5.3)
PROTHROMBIN TIME: 26.4 SECONDS (ref 11.6–14.5)
RBC # BLD AUTO: 2.36 MILLION/UL (ref 3.88–5.62)
RH BLD: NEGATIVE
SODIUM SERPL-SCNC: 137 MMOL/L (ref 135–147)
SPECIMEN EXPIRATION DATE: NORMAL
WBC # BLD AUTO: 6.53 THOUSAND/UL (ref 4.31–10.16)

## 2023-05-27 PROCEDURE — 30233N1 TRANSFUSION OF NONAUTOLOGOUS RED BLOOD CELLS INTO PERIPHERAL VEIN, PERCUTANEOUS APPROACH: ICD-10-PCS | Performed by: INTERNAL MEDICINE

## 2023-05-27 RX ORDER — FUROSEMIDE 10 MG/ML
40 INJECTION INTRAMUSCULAR; INTRAVENOUS ONCE
Status: COMPLETED | OUTPATIENT
Start: 2023-05-27 | End: 2023-05-27

## 2023-05-27 RX ORDER — BUMETANIDE 0.25 MG/ML
2 INJECTION INTRAMUSCULAR; INTRAVENOUS 2 TIMES DAILY
Status: DISCONTINUED | OUTPATIENT
Start: 2023-05-27 | End: 2023-05-28

## 2023-05-27 RX ORDER — LANOLIN ALCOHOL/MO/W.PET/CERES
3 CREAM (GRAM) TOPICAL
Status: DISCONTINUED | OUTPATIENT
Start: 2023-05-28 | End: 2023-05-29 | Stop reason: HOSPADM

## 2023-05-27 RX ORDER — MAGNESIUM SULFATE HEPTAHYDRATE 40 MG/ML
2 INJECTION, SOLUTION INTRAVENOUS ONCE
Status: COMPLETED | OUTPATIENT
Start: 2023-05-27 | End: 2023-05-27

## 2023-05-27 RX ORDER — FERROUS SULFATE 325(65) MG
325 TABLET ORAL
Status: DISCONTINUED | OUTPATIENT
Start: 2023-05-28 | End: 2023-05-29 | Stop reason: HOSPADM

## 2023-05-27 RX ORDER — POTASSIUM CHLORIDE 20 MEQ/1
40 TABLET, EXTENDED RELEASE ORAL 2 TIMES DAILY
Status: DISCONTINUED | OUTPATIENT
Start: 2023-05-27 | End: 2023-05-29

## 2023-05-27 RX ADMIN — PANTOPRAZOLE SODIUM 40 MG: 40 TABLET, DELAYED RELEASE ORAL at 04:41

## 2023-05-27 RX ADMIN — BUMETANIDE 2 MG: 0.25 INJECTION INTRAMUSCULAR; INTRAVENOUS at 20:26

## 2023-05-27 RX ADMIN — FUROSEMIDE 40 MG: 10 INJECTION, SOLUTION INTRAMUSCULAR; INTRAVENOUS at 17:19

## 2023-05-27 RX ADMIN — IRON SUCROSE 200 MG: 20 INJECTION, SOLUTION INTRAVENOUS at 09:12

## 2023-05-27 RX ADMIN — MELATONIN 3 MG: 3 TAB ORAL at 23:55

## 2023-05-27 RX ADMIN — ATORVASTATIN CALCIUM 40 MG: 40 TABLET, FILM COATED ORAL at 17:18

## 2023-05-27 RX ADMIN — POTASSIUM CHLORIDE 40 MEQ: 1500 TABLET, EXTENDED RELEASE ORAL at 17:18

## 2023-05-27 RX ADMIN — POTASSIUM CHLORIDE 40 MEQ: 1500 TABLET, EXTENDED RELEASE ORAL at 08:56

## 2023-05-27 RX ADMIN — GABAPENTIN 100 MG: 100 CAPSULE ORAL at 08:56

## 2023-05-27 RX ADMIN — WARFARIN SODIUM 6 MG: 3 TABLET ORAL at 20:25

## 2023-05-27 RX ADMIN — MAGNESIUM SULFATE HEPTAHYDRATE 2 G: 40 INJECTION, SOLUTION INTRAVENOUS at 10:40

## 2023-05-27 RX ADMIN — BUMETANIDE 2 MG: 0.25 INJECTION INTRAMUSCULAR; INTRAVENOUS at 08:58

## 2023-05-27 NOTE — ASSESSMENT & PLAN NOTE
Progressive dark stools starting last week  Worsening anemia noted (see below)  Amitiza held on admission  Appreciate gastroenterology input -> status post EGD with push enteroscopy on 5/26 revealing an 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 status post APC -> okay to resume anticoagulation per gastroenterology  Monitor H/H and transfuse if necessary

## 2023-05-27 NOTE — PROGRESS NOTES
"Cardiology Progress Note - Emre Quevedo 81 y o  male MRN: [id number]    Unit/Bed#: [facility] 813-01 Encounter: [id number]    Hospital Problems:  Principal Problem:    Acute kidney injury superimposed on stage 3 chronic kidney disease   Active Problems:    Type 2 diabetes mellitus with diabetic neuropathy    Acute on chronic anemia    Paroxysmal atrial fibrillation     History of AVR    History of ventricular tachycardia    Acute on chronic diastolic CHF    Venous ulcer of right ankle    Morbid obesity    Hyperlipidemia    History of venous thromboembolism    Melena      Assessment & Plan:    1  ADHF - still hypervolemic  Continue bumex 2 mg IV BID  Diuresing well on current regimen, net I/O -2 4 L last 24 hours  Renal function has been stable  Potassium low, increase supplementation to 40 mEq twice daily  2  Acute blood loss anemia -likely due to GI bleed in setting of supratherapeutic INR  Hemoglobin today 6 9 g/dL,  Slowly downtrending  INR in therapeutic range at 2 4  Recommend transfusion of PRBC to hemoglobin of 8 g/dL  If transfusing, give additional dose of Bumex 2 mg IV once  3  AF - continue sotalol, warfarin  Had supratherapeutic INR with GIB  GI input appreciated  4  Bioprosthetic AVR  5  pHTN  6  HTN         Subjective:   Patient seen and examined  No significant events overnight  He reports improvement of his breathing  Objective:     Vitals: Blood pressure 103/52, pulse 82, temperature 97 7 °F (36 5 °C), resp  rate 16, height 6' 1\" (1 854 m), weight (!) 145 kg (320 lb), SpO2 97 %  , Body mass index is 42 22 kg/m² ,   Orthostatic Blood Pressures    Flowsheet Row Most Recent Value   Blood Pressure 103/52 filed at 05/27/2023 0701   Patient Position - Orthostatic VS Lying filed at 05/26/2023 1124            Intake/Output Summary (Last 24 hours) at 5/27/2023 0833  Last data filed at 5/27/2023 0525  Gross per 24 hour   Intake 1200 ml   Output 3550 ml   Net -2350 ml       Physical " Exam:    General: Mando Ahumada is a morbidly obese elderly male, in no acute distress, sitting comfortably  HEENT: moist mucous membranes, EOMI  Neck: Unable to assess JVD, supple, trachea midline  Cardiovascular: unremarkable S1/S2, regular rate and rhythm, 2/6 SM  Pulmonary: normal respiratory effort,  basilar rales  Abdomen: soft and nondistended  Extremities: +3 bilateral lower extremity edema     Neuro: no focal motor deficits, AAOx3 (person, place, time)  Psych: Normal mood and affect, cooperative      Medications:      Current Facility-Administered Medications:   •  acetaminophen (TYLENOL) tablet 650 mg, 650 mg, Oral, Q4H PRN, Jayla Lebron MD  •  albuterol (PROVENTIL HFA,VENTOLIN HFA) inhaler 2 puff, 2 puff, Inhalation, Q6H PRN, Jayla Lebron MD  •  atorvastatin (LIPITOR) tablet 40 mg, 40 mg, Oral, After Britney Johnston MD, 40 mg at 05/26/23 1734  •  bumetanide (BUMEX) injection 2 mg, 2 mg, Intravenous, BID, Cassandra Jensen MD  •  fluticasone (FLONASE) 50 mcg/act nasal spray 2 spray, 2 spray, Each Nare, Daily PRN, Jayla Lebron MD  •  gabapentin (NEURONTIN) capsule 100 mg, 100 mg, Oral, Daily, Emeterio Cifuentes MD, 100 mg at 05/26/23 0846  •  insulin lispro (HumaLOG) 100 units/mL subcutaneous injection 1-5 Units, 1-5 Units, Subcutaneous, HS, Emeterio Cifuentes MD  •  insulin lispro (HumaLOG) 100 units/mL subcutaneous injection 1-6 Units, 1-6 Units, Subcutaneous, TID AC **AND** Fingerstick Glucose (POCT), , , TID AC, Emeterio Cifuentes MD  •  iron sucrose (VENOFER) 200 mg in sodium chloride 0 9 % 100 mL IVPB, 200 mg, Intravenous, Daily, Nelly Garcia MD, Last Rate: 100 mL/hr at 05/25/23 1050, 200 mg at 05/26/23 0846  •  lidocaine (LMX) 4 % cream, , Topical, Once, [employer], CRNP  •  ondansetron (ZOFRAN) injection 4 mg, 4 mg, Intravenous, Q6H PRN, Jayla Lebron MD  •  pantoprazole (PROTONIX) EC tablet 40 mg, 40 mg, Oral, Early Morning, Emeterio Cifuentes MD, 40 mg at 05/27/23 0441  •  potassium chloride "(K-DUR,KLOR-CON) CR tablet 20 mEq, 20 mEq, Oral, Daily, Emeterio Cifuentes MD, 20 mEq at 05/26/23 0846  •  sodium hypochlorite (DAKIN'S QUARTER-STRENGTH) 0 125 percent topical solution, , Irrigation, Daily, ODESSA Baez  •  sotalol (BETAPACE) tablet 80 mg, 80 mg, Oral, Daily, Cesilia Ventura MD, 80 mg at 05/26/23 6870  •  warfarin (COUMADIN) tablet 4 mg, 4 mg, Oral, Once per day on Sun Mon Wed Fri, Iris Gomez MD, 4 mg at 05/26/23 2101  •  warfarin (COUMADIN) tablet 6 mg, 6 mg, Oral, Once per day on Tue Thu Sat, Iris Gomez MD    Facility-Administered Medications Ordered in Other Encounters:   •  cefTRIAXone (ROCEPHIN) 2,000 mg in dextrose 5 % 50 mL IVPB, 2,000 mg, Intravenous, Once, Alie Mendez MD  •  sodium chloride 0 9 % infusion, 75 mL/hr, Intravenous, Continuous, Alie Mendez MD, Stopped at 05/26/23 1426     Labs & Results:        Results from last 7 days   Lab Units 05/27/23 0440 05/26/23 0527 05/25/23  0535   HEMATOCRIT % 22 3* 23 8* 24 4*   HEMOGLOBIN g/dL 6 9* 7 3* 7 1*   PLATELETS Thousands/uL 252 263 260   WBC Thousand/uL 6 53 5 51 5 47         Results from last 7 days   Lab Units 05/27/23  0440 05/26/23  0527 05/25/23  0535 05/24/23  1609   ALK PHOS U/L  --   --   --  103   ALT U/L  --   --   --  21   AST U/L  --   --   --  34   BUN mg/dL 38* 43* 45* 45*   CALCIUM mg/dL 8 7 8 8 8 8 8 7   CHLORIDE mmol/L 105 110* 109* 107   CO2 mmol/L 29 23 25 24   CREATININE mg/dL 1 75* 1 81* 1 89* 2 06*   POTASSIUM mmol/L 3 3* 3 8 4 2 4 3     Results from last 7 days   Lab Units 05/27/23  0440 05/26/23  0527 05/24/23  1609   INR  2 40* 2 57* 2 95*   PTT seconds  --   --  48*     Results from last 7 days   Lab Units 05/27/23  0440 05/24/23  1609   MAGNESIUM mg/dL 1 7 2 0          This note was completed in part utilizing [employer] direct voice recognition software   Grammatical errors, random word insertion, spelling mistakes, occasional wrong word or \"sound-alike\" substitutions and incomplete " sentences may be an occasional consequence of the system secondary to software limitations, ambient noise and hardware issues  At the time of dictation, efforts were made to edit, clarify and /or correct errors  Please read the chart carefully and recognize, using context, where substitutions have occurred  If you have any questions or concerns about the context, text or information contained within the body of this dictation, please contact myself, the provider, for further clarification

## 2023-05-27 NOTE — ASSESSMENT & PLAN NOTE
Last EF of 60% in May 2022 with mild biatrial dilatation, mild-moderate MR, mild AL, moderate TR, and severe pulmonary HTN  Appreciate cardiology input -> oral diuretics transitioned to IV Bumex currently (increased to 3 mg BID today)  Low-sodium diet  Monitor/replete serum potassium/magnesium deficiencies if present

## 2023-05-27 NOTE — PLAN OF CARE
Problem: MOBILITY - ADULT  Goal: Maintain or return to baseline ADL function  Description: INTERVENTIONS:  -  Assess patient's ability to carry out ADLs; assess patient's baseline for ADL function and identify physical deficits which impact ability to perform ADLs (bathing, care of mouth/teeth, toileting, grooming, dressing, etc )  - Assess/evaluate cause of self-care deficits   - Assess range of motion  - Assess patient's mobility; develop plan if impaired  - Assess patient's need for assistive devices and provide as appropriate  - Encourage maximum independence but intervene and supervise when necessary  - Involve family in performance of ADLs  - Assess for home care needs following discharge   - Consider OT consult to assist with ADL evaluation and planning for discharge  - Provide patient education as appropriate  Outcome: Progressing  Goal: Maintains/Returns to pre admission functional level  Description: INTERVENTIONS:  - Perform BMAT or MOVE assessment daily    - Set and communicate daily mobility goal to care team and patient/family/caregiver  - Collaborate with rehabilitation services on mobility goals if consulted  - Reposition patient every 3 hours    - Stand patient 3  times a day  - Ambulate patient 3 times a day  - Out of bed to chair 3 times a day   - Out of bed for meals 3 times a day  - Out of bed for toileting  - Record patient progress and toleration of activity level   Outcome: Progressing     Problem: PAIN - ADULT  Goal: Verbalizes/displays adequate comfort level or baseline comfort level  Description: Interventions:  - Encourage patient to monitor pain and request assistance  - Assess pain using appropriate pain scale  - Administer analgesics based on type and severity of pain and evaluate response  - Implement non-pharmacological measures as appropriate and evaluate response  - Consider cultural and social influences on pain and pain management  - Notify physician/advanced practitioner if interventions unsuccessful or patient reports new pain  Outcome: Progressing     Problem: INFECTION - ADULT  Goal: Absence or prevention of progression during hospitalization  Description: INTERVENTIONS:  - Assess and monitor for signs and symptoms of infection  - Monitor lab/diagnostic results  - Monitor all insertion sites, i e  indwelling lines, tubes, and drains  - Monitor endotracheal if appropriate and nasal secretions for changes in amount and color  - [place] appropriate cooling/warming therapies per order  - Administer medications as ordered  - Instruct and encourage patient and family to use good hand hygiene technique  - Identify and instruct in appropriate isolation precautions for identified infection/condition  Outcome: Progressing  Goal: Absence of fever/infection during neutropenic period  Description: INTERVENTIONS:  - Monitor WBC    Outcome: Progressing     Problem: DISCHARGE PLANNING  Goal: Discharge to home or other facility with appropriate resources  Description: INTERVENTIONS:  - Identify barriers to discharge w/patient and caregiver  - Arrange for needed discharge resources and transportation as appropriate  - Identify discharge learning needs (meds, wound care, etc )  - Arrange for interpretive services to assist at discharge as needed  - Refer to Case Management Department for coordinating discharge planning if the patient needs post-hospital services based on physician/advanced practitioner order or complex needs related to functional status, cognitive ability, or social support system  Outcome: Progressing     Problem: Prexisting or High Potential for Compromised Skin Integrity  Goal: Skin integrity is maintained or improved  Description: INTERVENTIONS:  - Identify patients at risk for skin breakdown  - Assess and monitor skin integrity  - Assess and monitor nutrition and hydration status  - Monitor labs   - Assess for incontinence   - Turn and reposition patient  - Assist with mobility/ambulation  - Relieve pressure over bony prominences  - Avoid friction and shearing  - Provide appropriate hygiene as needed including keeping skin clean and dry  - Evaluate need for skin moisturizer/barrier cream  - Collaborate with interdisciplinary team   - Patient/family teaching  - Consider wound care consult   Outcome: Progressing     Problem: Nutrition/Hydration-ADULT  Goal: Nutrient/Hydration intake appropriate for improving, restoring or maintaining nutritional needs  Description: Monitor and assess patient's nutrition/hydration status for malnutrition  Collaborate with interdisciplinary team and initiate plan and interventions as ordered  Monitor patient's weight and dietary intake as ordered or per policy  Utilize nutrition screening tool and intervene as necessary  Determine patient's food preferences and provide high-protein, high-caloric foods as appropriate       INTERVENTIONS:  - Monitor oral intake, urinary output, labs, and treatment plans  - Assess nutrition and hydration status and recommend course of action  - Evaluate amount of meals eaten  - Assist patient with eating if necessary   - Allow adequate time for meals  - Recommend/ encourage appropriate diets, oral nutritional supplements, and vitamin/mineral supplements  - Order, calculate, and assess calorie counts as needed  - Recommend, monitor, and adjust tube feedings and TPN/PPN based on assessed needs  - Assess need for intravenous fluids  - Provide specific nutrition/hydration education as appropriate  - Include patient/family/caregiver in decisions related to nutrition  Outcome: Progressing

## 2023-05-27 NOTE — ASSESSMENT & PLAN NOTE
Baseline creatinine of approximately 1 3-1 4 -> presented elevated at 2 06 -> improved to 1 7 today  Monitor renal function and urine output -> limit/avoid nephrotoxins and hypotension as possible -> holding HCTZ/ARB -> note chronic diuretic use in the setting of CHF  Monitor hemoglobin and transfuse if necessary in the setting of acute on chronic anemia  If creatinine declines/worsens, consider nephrology evaluation

## 2023-05-27 NOTE — PROGRESS NOTES
[facility]  Progress Note  Name: Oliver Lane  MRN: [id number]  [facility] Date of Admission: 5/24/2023   Date of Service: 5/27/2023 I Hospital Day: 3      Assessment & Plan:    * Acute kidney injury superimposed on stage 3 chronic kidney disease   Assessment & Plan  Baseline creatinine of approximately 1 3-1 4 -> presented elevated at 2 06 -> improved to 1 75 today  Monitor renal function and urine output -> limit/avoid nephrotoxins and hypotension as possible -> holding HCTZ/ARB -> note chronic diuretic use in the setting of CHF  Monitor hemoglobin and transfuse if necessary in the setting of acute on chronic anemia  If creatinine declines/worsens, consider nephrology evaluation    Acute on chronic diastolic CHF  Assessment & Plan  Last EF of 60% in May 2022 with mild biatrial dilatation, mild-moderate MR, mild PA, moderate TR, and severe pulmonary HTN  Appreciate cardiology input -> oral diuretics transitioned to IV Bumex BID currently  Low-sodium diet  Monitor/replete serum potassium/magnesium deficiencies if present    Melena  Assessment & Plan  Progressive dark stools starting last week  Worsening anemia noted (see below)  Amitiza held on admission  Appreciate gastroenterology input -> status post EGD with push enteroscopy yesterday revealing an 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 status post APC -> okay to resume anticoagulation per gastroenterology  Monitor H/H and transfuse if necessary    Acute on chronic anemia  Assessment & Plan  Baseline hemoglobin approximately 9-10 -> worsened at 6 9 today -> plan for PRBC transfusion today   Plan for melena above  Anticoagulation (Coumadin) resumed -> see results of patient uroscopy above  We will complete an IV Venofer trial today -> can start oral iron supplementation tomorrow  Gastroenterology following    Paroxysmal atrial fibrillation   Assessment & Plan  Controlled on Sotalol  Anticoagulation (Coumadin) resumed    Type 2 diabetes mellitus with diabetic neuropathy  Assessment & Plan  Lab Results   Component Value Date    HGBA1C 6 3 (H) 2022     Hold oral hypoglycemics while hospitalized  Continue SSI coverage per Accu-Cheks  Carbohydrate restricted diet  Hypoglycemia protocol  On Gabapentin for neuropathy    History of AVR  Assessment & Plan  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    Morbid obesity  Assessment & Plan  BMI of 42 22 currently  Lifestyle/diet modifications    History of ventricular tachycardia  Assessment & Plan  S/p ICD     History of venous thromboembolism  Assessment & Plan  Resumed anticoagulation (Coumadin)    Venous ulcer of right ankle  Assessment & Plan  Local wound care    Hyperlipidemia  Assessment & Plan  Continue statin      DVT Prophylaxis:  resumed Coumadin    Patient Centered Rounds:  I have performed bedside rounds and discussed plan of care with nursing today  Discussions with Specialists or Other Care Team Provider:  see above assessments if applicable    Education and Discussions with Family / Patient:  Patient at bedside - discussed, with wife, Jostin Choi, over the phone today    Time Spent for Care:  32 minutes  More than 50% of total time spent on counseling and coordination of care as described above  Current Length of Stay: 3 day(s)  Current Patient Status: Inpatient   Certification Statement:  Patient will continue to require additional hospital stay due to assessments as noted above  Code Status: Level 1 - Full Code        Subjective:     Encountered earlier today  Sitting up in a chair without active complaints  States he feels less fatigued today  Overall, he remains in pleasant and hopeful spirits          Objective:     Vitals:   Temp (24hrs), Av 7 °F (36 5 °C), Min:97 3 °F (36 3 °C), Max:97 9 °F (36 6 °C)    Temp:  [97 3 °F (36 3 °C)-97 9 °F (36 6 °C)] 97 9 °F (36 6 °C)  HR:  [67-82] 70  Resp:  [14-20] 20  BP: ()/(35-56) 98/42  SpO2:  [97 %-100 %] 98 %  Body mass index is 42 22 kg/m²  Input and Output Summary (last 24 hours): Intake/Output Summary (Last 24 hours) at 5/27/2023 1509  Last data filed at 5/27/2023 1441  Gross per 24 hour   Intake 960 ml   Output 3450 ml   Net -2490 ml       Physical Exam:     GENERAL  waxing/waning weakness/fatigue - obese   HEAD   Normocephalic - atraumatic   EYES   PERRL - EOMI    MOUTH   Mucosa moist   NECK   Supple - full range of motion   CARDIAC  rate controlled - S1/S2 positive   PULMONARY  diminished at the bases - nonlabored respirations at rest   ABDOMEN   Soft - nontender/nondistended - active bowel sounds   MUSCULOSKELETAL   Motor strength/range of motion deconditioned   NEUROLOGIC   Alert/oriented at baseline   SKIN   Chronic wrinkles/blemishes - right ankle ulceration dressed/bandaged   PSYCHIATRIC   Mood/affect stable         Additional Data:     Labs & Recent Cultures:    Results from last 7 days   Lab Units 05/27/23  0440   EOS PCT % 3   HEMATOCRIT % 22 3*   HEMOGLOBIN g/dL 6 9*   LYMPHS PCT % 16   MONOS PCT % 12   NEUTROS PCT % 67   PLATELETS Thousands/uL 252   WBC Thousand/uL 6 53     Results from last 7 days   Lab Units 05/27/23  0440 05/25/23  0535 05/24/23  1609   ALK PHOS U/L  --   --  103   ALT U/L  --   --  21   AST U/L  --   --  34   BUN mg/dL 38*   < > 45*   CALCIUM mg/dL 8 7   < > 8 7   CHLORIDE mmol/L 105   < > 107   CO2 mmol/L 29   < > 24   CREATININE mg/dL 1 75*   < > 2 06*   POTASSIUM mmol/L 3 3*   < > 4 3    < > = values in this interval not displayed       Results from last 7 days   Lab Units 05/27/23  0440   INR  2 40*     Results from last 7 days   Lab Units 05/27/23  1135 05/27/23  0700 05/26/23  1555 05/26/23  1120 05/26/23  0732 05/25/23  2138 05/25/23  1623 05/25/23  1129 05/25/23  0724 05/24/23  2223   POC GLUCOSE mg/dl 125 118 101 114 110 121 132 148* 114 127                         Lines/Drains:  Invasive Devices     Peripheral Intravenous Line  Duration           Peripheral IV 05/26/23 Left Antecubital <1 day                  Last 24 Hours Medication List:   Current Facility-Administered Medications   Medication Dose Route Frequency Provider Last Rate   • acetaminophen  650 mg Oral Q4H PRN Wayne Joy MD     • albuterol  2 puff Inhalation Q6H PRN Wayne Joy MD     • atorvastatin  40 mg Oral After Leighton Montero MD     • bumetanide  2 mg Intravenous BID Faustina Reyna MD     • [START ON 5/28/2023] ferrous sulfate  325 mg Oral Daily With Breakfast Marcell Velasco MD     • fluticasone  2 spray Each Nare Daily PRN Wayne Joy MD     • furosemide  40 mg Intravenous Once Marcell Velasco MD     • gabapentin  100 mg Oral Daily Wayne Joy MD     • insulin lispro  1-5 Units Subcutaneous HS Wayne Joy MD     • insulin lispro  1-6 Units Subcutaneous TID Kamilah Alba MD     • lidocaine   Topical Once ODESSA Reyes     • ondansetron  4 mg Intravenous Q6H PRN Wayne Joy MD     • pantoprazole  40 mg Oral Early Morning Wayne Joy MD     • potassium chloride  40 mEq Oral BID Faustina Reyna MD     • sodium hypochlorite   Irrigation Daily ODESSA Reyes     • sotalol  80 mg Oral Daily Wayne Joy MD     • warfarin  4 mg Oral Once per day on Sun Mon Wed Fri Marcell Velasco MD     • warfarin  6 mg Oral Once per day on Tue Thu Sat Marcell Velasco MD       Facility-Administered Medications Ordered in Other Encounters   Medication Dose Route Frequency Provider Last Rate   • cefTRIAXone  2,000 mg Intravenous Once Reymundo Connelly MD     • sodium chloride  75 mL/hr Intravenous Continuous Reymundo Connelly MD Stopped (05/26/23 1426)                    ** Please Note: This note is constructed using a voice recognition dictation system  An occasional wrong word/phrase or “sound-a-like” substitution may have been picked up by dictation device due to the inherent limitations of voice recognition software    Read the chart carefully and recognize, using reasonable context, where substitutions may have occurred  **

## 2023-05-27 NOTE — ASSESSMENT & PLAN NOTE
Baseline hemoglobin approximately 9-10 -> worsened at 6 9 yesterday status post PRBC transfusion with improvement of hemoglobin to 8 0 today  Plan for melena above  Anticoagulation (Coumadin) resumed -> see results of endoscopy above  Completed an IV Venofer trial -> started on oral iron supplementation  Gastroenterology following

## 2023-05-28 LAB
ABO GROUP BLD BPU: NORMAL
ANION GAP SERPL CALCULATED.3IONS-SCNC: 4 MMOL/L (ref 4–13)
BASOPHILS # BLD AUTO: 0.03 THOUSANDS/ÂΜL (ref 0–0.1)
BASOPHILS NFR BLD AUTO: 1 % (ref 0–1)
BPU ID: NORMAL
BUN SERPL-MCNC: 34 MG/DL (ref 5–25)
CALCIUM SERPL-MCNC: 9 MG/DL (ref 8.3–10.1)
CHLORIDE SERPL-SCNC: 104 MMOL/L (ref 96–108)
CO2 SERPL-SCNC: 27 MMOL/L (ref 21–32)
CREAT SERPL-MCNC: 1.7 MG/DL (ref 0.6–1.3)
CROSSMATCH: NORMAL
EOSINOPHIL # BLD AUTO: 0.16 THOUSAND/ÂΜL (ref 0–0.61)
EOSINOPHIL NFR BLD AUTO: 2 % (ref 0–6)
ERYTHROCYTE [DISTWIDTH] IN BLOOD BY AUTOMATED COUNT: 20.4 % (ref 11.6–15.1)
GFR SERPL CREATININE-BSD FRML MDRD: 35 ML/MIN/1.73SQ M
GLUCOSE SERPL-MCNC: 106 MG/DL (ref 65–140)
GLUCOSE SERPL-MCNC: 112 MG/DL (ref 65–140)
GLUCOSE SERPL-MCNC: 129 MG/DL (ref 65–140)
GLUCOSE SERPL-MCNC: 139 MG/DL (ref 65–140)
GLUCOSE SERPL-MCNC: 139 MG/DL (ref 65–140)
HCT VFR BLD AUTO: 25.4 % (ref 36.5–49.3)
HGB BLD-MCNC: 8 G/DL (ref 12–17)
IMM GRANULOCYTES # BLD AUTO: 0.05 THOUSAND/UL (ref 0–0.2)
IMM GRANULOCYTES NFR BLD AUTO: 1 % (ref 0–2)
INR PPP: 2.14 (ref 0.84–1.19)
LYMPHOCYTES # BLD AUTO: 1.2 THOUSANDS/ÂΜL (ref 0.6–4.47)
LYMPHOCYTES NFR BLD AUTO: 18 % (ref 14–44)
MAGNESIUM SERPL-MCNC: 2.2 MG/DL (ref 1.6–2.6)
MCH RBC QN AUTO: 29.1 PG (ref 26.8–34.3)
MCHC RBC AUTO-ENTMCNC: 31.5 G/DL (ref 31.4–37.4)
MCV RBC AUTO: 92 FL (ref 82–98)
MONOCYTES # BLD AUTO: 0.81 THOUSAND/ÂΜL (ref 0.17–1.22)
MONOCYTES NFR BLD AUTO: 12 % (ref 4–12)
NEUTROPHILS # BLD AUTO: 4.35 THOUSANDS/ÂΜL (ref 1.85–7.62)
NEUTS SEG NFR BLD AUTO: 66 % (ref 43–75)
NRBC BLD AUTO-RTO: 0 /100 WBCS
PLATELET # BLD AUTO: 270 THOUSANDS/UL (ref 149–390)
PMV BLD AUTO: 9.4 FL (ref 8.9–12.7)
POTASSIUM SERPL-SCNC: 3.6 MMOL/L (ref 3.5–5.3)
PROTHROMBIN TIME: 24.1 SECONDS (ref 11.6–14.5)
RBC # BLD AUTO: 2.75 MILLION/UL (ref 3.88–5.62)
SODIUM SERPL-SCNC: 135 MMOL/L (ref 135–147)
UNIT DISPENSE STATUS: NORMAL
UNIT PRODUCT CODE: NORMAL
UNIT PRODUCT VOLUME: 250 ML
UNIT RH: NORMAL
WBC # BLD AUTO: 6.6 THOUSAND/UL (ref 4.31–10.16)

## 2023-05-28 RX ORDER — BUMETANIDE 0.25 MG/ML
1 INJECTION INTRAMUSCULAR; INTRAVENOUS ONCE
Status: COMPLETED | OUTPATIENT
Start: 2023-05-28 | End: 2023-05-28

## 2023-05-28 RX ORDER — BUMETANIDE 0.25 MG/ML
3 INJECTION INTRAMUSCULAR; INTRAVENOUS 2 TIMES DAILY
Status: DISCONTINUED | OUTPATIENT
Start: 2023-05-28 | End: 2023-05-29

## 2023-05-28 RX ADMIN — PANTOPRAZOLE SODIUM 40 MG: 40 TABLET, DELAYED RELEASE ORAL at 05:14

## 2023-05-28 RX ADMIN — BUMETANIDE 3 MG: 0.25 INJECTION INTRAMUSCULAR; INTRAVENOUS at 18:08

## 2023-05-28 RX ADMIN — MELATONIN 3 MG: 3 TAB ORAL at 21:18

## 2023-05-28 RX ADMIN — SOTALOL HYDROCHLORIDE 80 MG: 80 TABLET ORAL at 08:03

## 2023-05-28 RX ADMIN — FERROUS SULFATE TAB 325 MG (65 MG ELEMENTAL FE) 325 MG: 325 (65 FE) TAB at 08:02

## 2023-05-28 RX ADMIN — POTASSIUM CHLORIDE 40 MEQ: 1500 TABLET, EXTENDED RELEASE ORAL at 18:05

## 2023-05-28 RX ADMIN — BUMETANIDE 2 MG: 0.25 INJECTION INTRAMUSCULAR; INTRAVENOUS at 09:24

## 2023-05-28 RX ADMIN — ATORVASTATIN CALCIUM 40 MG: 40 TABLET, FILM COATED ORAL at 18:05

## 2023-05-28 RX ADMIN — POTASSIUM CHLORIDE 40 MEQ: 1500 TABLET, EXTENDED RELEASE ORAL at 08:02

## 2023-05-28 RX ADMIN — WARFARIN SODIUM 4 MG: 2 TABLET ORAL at 21:16

## 2023-05-28 RX ADMIN — SODIUM HYPOCHLORITE 473 APPLICATION.: 1.25 SOLUTION TOPICAL at 16:06

## 2023-05-28 RX ADMIN — BUMETANIDE 1 MG: 0.25 INJECTION INTRAMUSCULAR; INTRAVENOUS at 09:22

## 2023-05-28 RX ADMIN — GABAPENTIN 100 MG: 100 CAPSULE ORAL at 08:02

## 2023-05-28 NOTE — PROGRESS NOTES
"Cardiology Progress Note - Helio Plummer 81 y o  male MRN: [id number]    Unit/Bed#: [facility] 813-01 Encounter: [id number]    Hospital Problems:  Principal Problem:    Acute kidney injury superimposed on stage 3 chronic kidney disease   Active Problems:    Type 2 diabetes mellitus with diabetic neuropathy    Acute on chronic anemia    Paroxysmal atrial fibrillation     History of AVR    History of ventricular tachycardia    Acute on chronic diastolic CHF    Venous ulcer of right ankle    Morbid obesity    Hyperlipidemia    History of venous thromboembolism    Melena      Assessment & Plan:    1  ADHF - still hypervolemic  Increase to bumex 3 mg IV BID (from 2 mg)  Diuresing on current regimen, net I/O -1 3 L last 24 hours, still 10-15 lbs over dry weight     Nursing has not checked daily weights since 5/26  Renal function has been stable  Potassium WNL on KCl 40 meq BID  2  Acute blood loss anemia -likely due to GI bleed in setting of supratherapeutic INR  Hemoglobin today 8 0 g/dL after 1 unit PRBC yesterday  INR in therapeutic range  3  AF - continue sotalol, warfarin  Had supratherapeutic INR with GIB  GI performed push enteroscopy now status post APC of angioectasia; GI cleared for resumption of [address]  4  Bioprosthetic AVR  5  pHTN  6  HTN         Subjective:   Patient seen and examined  No significant events overnight  Objective:     Vitals: Blood pressure 122/56, pulse 75, temperature 98 5 °F (36 9 °C), resp  rate 15, height 6' 1\" (1 854 m), weight (!) 145 kg (320 lb), SpO2 94 %  , Body mass index is 42 22 kg/m² ,   Orthostatic Blood Pressures    Flowsheet Row Most Recent Value   Blood Pressure 122/56 filed at 05/28/2023 0744   Patient Position - Orthostatic VS Lying filed at 05/27/2023 1452            Intake/Output Summary (Last 24 hours) at 5/28/2023 3869  Last data filed at 5/28/2023 0117  Gross per 24 hour   Intake 830 ml   Output 2150 ml   Net -1320 ml       Physical Exam:  General: Helio Plummer is " a morbidly obese elderly male, in no acute distress, walking to the bathroom  HEENT: moist mucous membranes, EOMI  Neck: Unable to assess JVD, supple, trachea midline  Cardiovascular: unremarkable S1/S2, regular rate and rhythm, 2/6 SM  Pulmonary: normal respiratory effort, CTAB  Abdomen: soft and nondistended  Extremities: +3 bilateral pitting lower extremity edema with stasis skin changes  Warm and well perfused extremities    Neuro: no focal motor deficits, AAOx3 (person, place, time)  Psych: Normal mood and affect, cooperative          Medications:      Current Facility-Administered Medications:   •  acetaminophen (TYLENOL) tablet 650 mg, 650 mg, Oral, Q4H PRN, Kandi Walton MD  •  albuterol (PROVENTIL HFA,VENTOLIN HFA) inhaler 2 puff, 2 puff, Inhalation, Q6H PRN, Kandi Walton MD  •  atorvastatin (LIPITOR) tablet 40 mg, 40 mg, Oral, After Jeanie Mckay MD, 40 mg at 05/27/23 1718  •  bumetanide (BUMEX) injection 2 mg, 2 mg, Intravenous, BID, Rick Schumacher MD, 2 mg at 05/28/23 0803  •  ferrous sulfate tablet 325 mg, 325 mg, Oral, Daily With Breakfast, Magy Gallegos MD, 325 mg at 05/28/23 0802  •  fluticasone (FLONASE) 50 mcg/act nasal spray 2 spray, 2 spray, Each Nare, Daily PRN, Kandi Waltno MD  •  gabapentin (NEURONTIN) capsule 100 mg, 100 mg, Oral, Daily, Emeterio Cifuentes MD, 100 mg at 05/28/23 0802  •  insulin lispro (HumaLOG) 100 units/mL subcutaneous injection 1-5 Units, 1-5 Units, Subcutaneous, HS, Emeterio Cifuentes MD  •  insulin lispro (HumaLOG) 100 units/mL subcutaneous injection 1-6 Units, 1-6 Units, Subcutaneous, TID AC **AND** Fingerstick Glucose (POCT), , , TID AC, Emeterio Cifuentes MD  •  lidocaine (LMX) 4 % cream, , Topical, Once, [employer], ODESSA  •  melatonin tablet 3 mg, 3 mg, Oral, HS, Rossana Zacarias PA-C, 3 mg at 05/27/23 1314  •  ondansetron (ZOFRAN) injection 4 mg, 4 mg, Intravenous, Q6H PRN, Kandi Walton MD  •  pantoprazole (PROTONIX) EC tablet 40 mg, 40 mg, Oral, Early Morning, Mally Stein MD, 40 mg at 05/28/23 0514  •  potassium chloride (K-DUR,KLOR-CON) CR tablet 40 mEq, 40 mEq, Oral, BID, Sweta Saul MD, 40 mEq at 05/28/23 0802  •  sodium hypochlorite (DAKIN'S QUARTER-STRENGTH) 0 125 percent topical solution, , Irrigation, Daily, ODESSA Smith  •  sotalol (BETAPACE) tablet 80 mg, 80 mg, Oral, Daily, Mally Stein MD, 80 mg at 05/28/23 0803  •  warfarin (COUMADIN) tablet 4 mg, 4 mg, Oral, Once per day on Sun Mon Wed Fri, Nahid Linares MD, 4 mg at 05/26/23 2101  •  warfarin (COUMADIN) tablet 6 mg, 6 mg, Oral, Once per day on Tue Thu Sat, Nahid Linares MD, 6 mg at 05/27/23 2025    Facility-Administered Medications Ordered in Other Encounters:   •  cefTRIAXone (ROCEPHIN) 2,000 mg in dextrose 5 % 50 mL IVPB, 2,000 mg, Intravenous, Once, Roxie Woods MD  •  sodium chloride 0 9 % infusion, 75 mL/hr, Intravenous, Continuous, Roxie Woods MD, Stopped at 05/26/23 1426     Labs & Results:        Results from last 7 days   Lab Units 05/28/23  0600 05/27/23  0440 05/26/23  0527   HEMATOCRIT % 25 4* 22 3* 23 8*   HEMOGLOBIN g/dL 8 0* 6 9* 7 3*   PLATELETS Thousands/uL 270 252 263   WBC Thousand/uL 6 60 6 53 5 51         Results from last 7 days   Lab Units 05/28/23  0600 05/27/23  0440 05/26/23  0527 05/25/23  0535 05/24/23  1609   ALK PHOS U/L  --   --   --   --  103   ALT U/L  --   --   --   --  21   AST U/L  --   --   --   --  34   BUN mg/dL 34* 38* 43*   < > 45*   CALCIUM mg/dL 9 0 8 7 8 8   < > 8 7   CHLORIDE mmol/L 104 105 110*   < > 107   CO2 mmol/L 27 29 23   < > 24   CREATININE mg/dL 1 70* 1 75* 1 81*   < > 2 06*   POTASSIUM mmol/L 3 6 3 3* 3 8   < > 4 3    < > = values in this interval not displayed       Results from last 7 days   Lab Units 05/28/23 0758 05/27/23 0440 05/26/23 0527 05/24/23  1609   INR  2 14* 2 40* 2 57* 2 95*   PTT seconds  --   --   --  48*     Results from last 7 days   Lab Units 05/28/23 0758 05/27/23 0440 05/24/23  1609 "  MAGNESIUM mg/dL 2 2 1 7 2 0          This note was completed in part utilizing [employer] fluency direct voice recognition software  Grammatical errors, random word insertion, spelling mistakes, occasional wrong word or \"sound-alike\" substitutions and incomplete sentences may be an occasional consequence of the system secondary to software limitations, ambient noise and hardware issues  At the time of dictation, efforts were made to edit, clarify and /or correct errors  Please read the chart carefully and recognize, using context, where substitutions have occurred  If you have any questions or concerns about the context, text or information contained within the body of this dictation, please contact myself, the provider, for further clarification      "

## 2023-05-28 NOTE — PROGRESS NOTES
[facility]  Progress Note  Name: Oliver Lane  MRN: [id number]  Unit/Bed#: [facility] 134-90 I Date of Admission: 5/24/2023   Date of Service: 5/28/2023 I Hospital Day: 4      Assessment & Plan:    * Acute kidney injury superimposed on stage 3 chronic kidney disease   Assessment & Plan  Baseline creatinine of approximately 1 3-1 4 -> presented elevated at 2 06 -> improved to 1 7 today  Monitor renal function and urine output -> limit/avoid nephrotoxins and hypotension as possible -> holding HCTZ/ARB -> note chronic diuretic use in the setting of CHF  Monitor hemoglobin and transfuse if necessary in the setting of acute on chronic anemia  If creatinine declines/worsens, consider nephrology evaluation    Acute on chronic diastolic CHF  Assessment & Plan  Last EF of 60% in May 2022 with mild biatrial dilatation, mild-moderate MR, mild PA, moderate TR, and severe pulmonary HTN  Appreciate cardiology input -> oral diuretics transitioned to IV Bumex currently (increased to 3 mg BID today)  Low-sodium diet  Monitor/replete serum potassium/magnesium deficiencies if present    Melena  Assessment & Plan  Progressive dark stools starting last week  Worsening anemia noted (see below)  Amitiza held on admission  Appreciate gastroenterology input -> status post EGD with push enteroscopy on 5/26 revealing an 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 status post APC -> okay to resume anticoagulation per gastroenterology  Monitor H/H and transfuse if necessary    Acute on chronic anemia  Assessment & Plan  Baseline hemoglobin approximately 9-10 -> worsened at 6 9 yesterday status post PRBC transfusion with improvement of hemoglobin to 8 0 today  Plan for melena above  Anticoagulation (Coumadin) resumed -> see results of endoscopy above  Completed an IV Venofer trial -> started on oral iron supplementation  Gastroenterology following    Paroxysmal atrial fibrillation   Assessment & Plan  Controlled on Sotalol  Anticoagulation (Coumadin) resumed    Type 2 diabetes mellitus with diabetic neuropathy  Assessment & Plan  Lab Results   Component Value Date    HGBA1C 6 3 (H) 2022     Hold oral hypoglycemics while hospitalized  Continue SSI coverage per Accu-Cheks  Carbohydrate restricted diet  Hypoglycemia protocol  On Gabapentin for neuropathy    History of AVR  Assessment & Plan  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    Morbid obesity  Assessment & Plan  BMI of 42 22 currently  Lifestyle/diet modifications    History of ventricular tachycardia  Assessment & Plan  S/p ICD     History of venous thromboembolism  Assessment & Plan  Resumed anticoagulation (Coumadin)    Venous ulcer of right ankle  Assessment & Plan  Local wound care    Hyperlipidemia  Assessment & Plan  Continue statin      DVT Prophylaxis:  resumed Coumadin    Patient Centered Rounds:  I have performed bedside rounds and discussed plan of care with nursing today  Discussions with Specialists or Other Care Team Provider:  see above assessments if applicable    Education and Discussions with Family / Patient:  Patient, along with wife, at bedside today    Time Spent for Care:  32 minutes  More than 50% of total time spent on counseling and coordination of care as described above  Current Length of Stay: 4 day(s)  Current Patient Status: Inpatient   Certification Statement:  Patient will continue to require additional hospital stay due to assessments as noted above  Code Status: Level 1 - Full Code        Subjective:     Encountered earlier in the day  Sitting upright in chair  Still acknowledges some weakness/fatigue but denies any new bleeding at this time          Objective:     Vitals:   Temp (24hrs), Av °F (36 7 °C), Min:97 5 °F (36 4 °C), Max:98 6 °F (37 °C)    Temp:  [97 5 °F (36 4 °C)-98 6 °F (37 °C)] 98 5 °F (36 9 °C)  HR:  [70-83] 75  Resp:  [15-20] 15  BP: ()/(35-59) 122/56  SpO2:  [92 %-100 %] 94 %  Body mass index is 42 22 kg/m²  Input and Output Summary (last 24 hours): Intake/Output Summary (Last 24 hours) at 5/28/2023 1346  Last data filed at 5/28/2023 1158  Gross per 24 hour   Intake 1070 ml   Output 2300 ml   Net -1230 ml       Physical Exam:     GENERAL  obese - waxing/waning weakness/fatigue   HEAD   Normocephalic - atraumatic   EYES   PERRL - EOMI    MOUTH   Mucosa moist   NECK   Supple - full range of motion   CARDIAC  rate controlled - S1/S2 positive   PULMONARY  improved bibasilar breath sounds - nonlabored respirations at rest   ABDOMEN   Soft - nontender/nondistended - active bowel sounds   MUSCULOSKELETAL   Motor strength/range of motion deconditioned   NEUROLOGIC   Alert/oriented at baseline   SKIN   Chronic wrinkles/blemishes - right ankle ulceration dressed/bandaged   PSYCHIATRIC   Mood/affect pleasant         Additional Data:     Labs & Recent Cultures:    Results from last 7 days   Lab Units 05/28/23  0600   EOS PCT % 2   HEMATOCRIT % 25 4*   HEMOGLOBIN g/dL 8 0*   LYMPHS PCT % 18   MONOS PCT % 12   NEUTROS PCT % 66   PLATELETS Thousands/uL 270   WBC Thousand/uL 6 60     Results from last 7 days   Lab Units 05/28/23  0600 05/25/23  0535 05/24/23  1609   ALK PHOS U/L  --   --  103   ALT U/L  --   --  21   AST U/L  --   --  34   BUN mg/dL 34*   < > 45*   CALCIUM mg/dL 9 0   < > 8 7   CHLORIDE mmol/L 104   < > 107   CO2 mmol/L 27   < > 24   CREATININE mg/dL 1 70*   < > 2 06*   POTASSIUM mmol/L 3 6   < > 4 3    < > = values in this interval not displayed       Results from last 7 days   Lab Units 05/28/23  0758   INR  2 14*     Results from last 7 days   Lab Units 05/28/23  1134 05/28/23  0758 05/27/23  2049 05/27/23  1608 05/27/23  1135 05/27/23  0700 05/26/23  1555 05/26/23  1120 05/26/23  0732 05/25/23  2138 05/25/23  1623 05/25/23  1129   POC GLUCOSE mg/dl 139 106 117 120 125 118 101 114 110 121 132 148*                         Lines/Drains:  Invasive Devices     Peripheral Intravenous Line Duration           Peripheral IV 05/28/23 Dorsal (posterior); Left Forearm <1 day                  Last 24 Hours Medication List:   Current Facility-Administered Medications   Medication Dose Route Frequency Provider Last Rate   • acetaminophen  650 mg Oral Q4H PRN Edmond Sebastian MD     • albuterol  2 puff Inhalation Q6H PRN Edmond Sebastian MD     • atorvastatin  40 mg Oral After Trevor Parada MD     • bumetanide  3 mg Intravenous BID Wendy Tipton MD     • ferrous sulfate  325 mg Oral Daily With Breakfast Sukumar Lindo MD     • fluticasone  2 spray Each Nare Daily PRN Edmond Sebastian MD     • gabapentin  100 mg Oral Daily Edmond Sebastian MD     • insulin lispro  1-5 Units Subcutaneous HS Edmond Sebastian MD     • insulin lispro  1-6 Units Subcutaneous TID AC Emeterio Cifuentes MD     • lidocaine   Topical Once ODESSA Robbins     • melatonin  3 mg Oral HS Rossana Zacarias PA-C     • ondansetron  4 mg Intravenous Q6H PRN Edmond Sebastian MD     • pantoprazole  40 mg Oral Early Morning Edmond Sebastian MD     • potassium chloride  40 mEq Oral BID Reinier Angel MD     • sodium hypochlorite   Irrigation Daily ODESSA Robbins     • sotalol  80 mg Oral Daily Edmond Sebastian MD     • warfarin  4 mg Oral Once per day on Sun Mon Wed Fri Sukumar Lindo MD     • warfarin  6 mg Oral Once per day on Tue Thu Sat Sukumar Lindo MD       Facility-Administered Medications Ordered in Other Encounters   Medication Dose Route Frequency Provider Last Rate   • cefTRIAXone  2,000 mg Intravenous Once Yesenia Knapp MD     • sodium chloride  75 mL/hr Intravenous Continuous Yesenia Knapp MD Stopped (05/26/23 8380)                    ** Please Note: This note is constructed using a voice recognition dictation system  An occasional wrong word/phrase or “sound-a-like” substitution may have been picked up by dictation device due to the inherent limitations of voice recognition software    Read the chart carefully and recognize, using reasonable context, where substitutions may have occurred  **

## 2023-05-29 VITALS
TEMPERATURE: 98 F | SYSTOLIC BLOOD PRESSURE: 103 MMHG | RESPIRATION RATE: 15 BRPM | BODY MASS INDEX: 38.69 KG/M2 | DIASTOLIC BLOOD PRESSURE: 51 MMHG | OXYGEN SATURATION: 95 % | HEART RATE: 64 BPM | HEIGHT: 73 IN | WEIGHT: 291.89 LBS

## 2023-05-29 LAB
ANION GAP SERPL CALCULATED.3IONS-SCNC: 1 MMOL/L (ref 4–13)
BASOPHILS # BLD AUTO: 0.05 THOUSANDS/ÂΜL (ref 0–0.1)
BASOPHILS NFR BLD AUTO: 1 % (ref 0–1)
BUN SERPL-MCNC: 32 MG/DL (ref 5–25)
CALCIUM SERPL-MCNC: 9.1 MG/DL (ref 8.3–10.1)
CHLORIDE SERPL-SCNC: 102 MMOL/L (ref 96–108)
CO2 SERPL-SCNC: 32 MMOL/L (ref 21–32)
CREAT SERPL-MCNC: 1.81 MG/DL (ref 0.6–1.3)
EOSINOPHIL # BLD AUTO: 0.3 THOUSAND/ÂΜL (ref 0–0.61)
EOSINOPHIL NFR BLD AUTO: 5 % (ref 0–6)
ERYTHROCYTE [DISTWIDTH] IN BLOOD BY AUTOMATED COUNT: 20.6 % (ref 11.6–15.1)
GFR SERPL CREATININE-BSD FRML MDRD: 33 ML/MIN/1.73SQ M
GLUCOSE SERPL-MCNC: 110 MG/DL (ref 65–140)
GLUCOSE SERPL-MCNC: 112 MG/DL (ref 65–140)
GLUCOSE SERPL-MCNC: 151 MG/DL (ref 65–140)
HCT VFR BLD AUTO: 26.9 % (ref 36.5–49.3)
HGB BLD-MCNC: 8.5 G/DL (ref 12–17)
IMM GRANULOCYTES # BLD AUTO: 0.02 THOUSAND/UL (ref 0–0.2)
IMM GRANULOCYTES NFR BLD AUTO: 0 % (ref 0–2)
INR PPP: 2.2 (ref 0.84–1.19)
LYMPHOCYTES # BLD AUTO: 1.2 THOUSANDS/ÂΜL (ref 0.6–4.47)
LYMPHOCYTES NFR BLD AUTO: 22 % (ref 14–44)
MAGNESIUM SERPL-MCNC: 2.2 MG/DL (ref 1.6–2.6)
MCH RBC QN AUTO: 29.4 PG (ref 26.8–34.3)
MCHC RBC AUTO-ENTMCNC: 31.6 G/DL (ref 31.4–37.4)
MCV RBC AUTO: 93 FL (ref 82–98)
MONOCYTES # BLD AUTO: 0.75 THOUSAND/ÂΜL (ref 0.17–1.22)
MONOCYTES NFR BLD AUTO: 13 % (ref 4–12)
NEUTROPHILS # BLD AUTO: 3.27 THOUSANDS/ÂΜL (ref 1.85–7.62)
NEUTS SEG NFR BLD AUTO: 59 % (ref 43–75)
NRBC BLD AUTO-RTO: 0 /100 WBCS
PLATELET # BLD AUTO: 264 THOUSANDS/UL (ref 149–390)
PMV BLD AUTO: 9.3 FL (ref 8.9–12.7)
POTASSIUM SERPL-SCNC: 3.8 MMOL/L (ref 3.5–5.3)
PROTHROMBIN TIME: 24.7 SECONDS (ref 11.6–14.5)
RBC # BLD AUTO: 2.89 MILLION/UL (ref 3.88–5.62)
SODIUM SERPL-SCNC: 135 MMOL/L (ref 135–147)
WBC # BLD AUTO: 5.59 THOUSAND/UL (ref 4.31–10.16)

## 2023-05-29 RX ORDER — POTASSIUM CHLORIDE 20 MEQ/1
20 TABLET, EXTENDED RELEASE ORAL 2 TIMES DAILY
Status: DISCONTINUED | OUTPATIENT
Start: 2023-05-29 | End: 2023-05-29 | Stop reason: HOSPADM

## 2023-05-29 RX ORDER — TORSEMIDE 20 MG/1
40 TABLET ORAL 2 TIMES DAILY
Status: DISCONTINUED | OUTPATIENT
Start: 2023-05-29 | End: 2023-05-29 | Stop reason: HOSPADM

## 2023-05-29 RX ORDER — TORSEMIDE 20 MG/1
40 TABLET ORAL 2 TIMES DAILY
Status: DISCONTINUED | OUTPATIENT
Start: 2023-05-29 | End: 2023-05-29

## 2023-05-29 RX ADMIN — PANTOPRAZOLE SODIUM 40 MG: 40 TABLET, DELAYED RELEASE ORAL at 05:06

## 2023-05-29 RX ADMIN — INSULIN LISPRO 1 UNITS: 100 INJECTION, SOLUTION INTRAVENOUS; SUBCUTANEOUS at 11:26

## 2023-05-29 RX ADMIN — POTASSIUM CHLORIDE 40 MEQ: 1500 TABLET, EXTENDED RELEASE ORAL at 08:00

## 2023-05-29 RX ADMIN — FERROUS SULFATE TAB 325 MG (65 MG ELEMENTAL FE) 325 MG: 325 (65 FE) TAB at 07:59

## 2023-05-29 RX ADMIN — GABAPENTIN 100 MG: 100 CAPSULE ORAL at 08:00

## 2023-05-29 RX ADMIN — BUMETANIDE 3 MG: 0.25 INJECTION INTRAMUSCULAR; INTRAVENOUS at 08:00

## 2023-05-29 NOTE — DISCHARGE SUMMARY
Discharge Summary - [facility]  Patient: Sangeetha Griffith 81 y o  male   MRN: [id number]  PCP: Radha Keith DO  [facility] Encounter: [id number]            Discharging Physician / Practitioner: Tessa Powesr MD  PCP: Radha Keith DO  Admission Date:   Admission Orders (From admission, onward)     Ordered        05/24/23 1749  INPATIENT ADMISSION  Once                      Discharge Date: 05/29/23      Reason for Admission:  Fatigue and shortness of breath      Discharge Diagnoses:     Principal Problem:    Acute kidney injury superimposed on stage 3 chronic kidney disease     Acute on chronic diastolic CHF    Active Problems:    Melena    Acute on chronic anemia    Paroxysmal atrial fibrillation     Type 2 diabetes mellitus with diabetic neuropathy    History of AVR    Morbid obesity    History of ventricular tachycardia    History of venous thromboembolism    Venous ulcer of right ankle    Hyperlipidemia      Consultations During Hospital Stay:  · Gastroenterology  · Cardiology      Hospital Course:     * Acute kidney injury superimposed on stage 3 chronic kidney disease   Assessment & Plan  Baseline creatinine of approximately 1 3-1 4 -> presented elevated at 2 06 -> generally improved to 1 81 today (per cardiology, in order to maintain euvolemia with optimized diuretics, may need to accept a higher baseline creatinine)  Monitor renal function and urine output -> limit/avoid nephrotoxins and hypotension as possible -> discontinued HCTZ/ARB on admission -> will be discharged on a modified Demadex dose (see below)  Monitored hemoglobin and transfused as necessary in the setting of acute on chronic anemia  Plan for discharge home today as per patient/wife request and cardiology clearance     Acute on chronic diastolic CHF  Assessment & Plan  Last EF of 60% in May 2022 with mild biatrial dilatation, mild-moderate MR, mild NJ, moderate TR, and severe pulmonary HTN   Appreciate cardiology input -> oral diuretics transitioned to IV Bumex and now transitioned to an optimized Demadex regimen of 40 mg BID w/ potassium supplementation for discharge  Low-sodium diet encouraged  Monitored/repleted serum potassium/magnesium deficiencies if present     Melena  Assessment & Plan  Progressive dark stools starting last week  See plan for anemia below  Amitiza held on admission -> resumed on discharge  Appreciate gastroenterology input -> status post EGD with push enteroscopy on 5/26 revealing an 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 status post APC -> cleared to resume anticoagulation per gastroenterology     Acute on chronic anemia  Assessment & Plan  Baseline hemoglobin approximately 9-10 -> worsened at 6 9  on 5/27 status post PRBC transfusion with improvement of hemoglobin to 8 0 yesterday and further improvement/stabilization at 8 5 today  Plan for melena above  Anticoagulation (Coumadin) previously resumed -> see results of endoscopy above  Completed an IV Venofer trial -> resume oral iron supplementation  Gastroenterology input appreciated     Paroxysmal atrial fibrillation   Assessment & Plan  Controlled on Sotalol  Anticoagulation (Coumadin) resumed     Type 2 diabetes mellitus with diabetic neuropathy  Assessment & Plan        Lab Results   Component Value Date     HGBA1C 6 3 (H) 03/01/2022      Initially held oral hypoglycemics while hospitalized -> can resume home regimen on discharge  Maintained on SSI coverage per Accu-Cheks during hospital course  Carbohydrate restricted diet  On Gabapentin for neuropathy     History of AVR  Assessment & Plan  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     Morbid obesity  Assessment & Plan  BMI of 38 51 currently  Lifestyle/diet modifications     History of ventricular tachycardia  Assessment & Plan  S/p ICD      History of venous thromboembolism  Assessment & Plan  Continue anticoagulation (Coumadin)     Venous ulcer of right ankle  Assessment & Plan  Local wound "care     Hyperlipidemia  Assessment & Plan  Continue statin      Condition at Discharge: fair       Discharge Day Visit / Exam:     Vitals: Blood Pressure: 103/51 (05/29/23 0721)  Pulse: 64 (05/29/23 0721)  Temperature: 98 °F (36 7 °C) (05/29/23 0721)  Temp Source: Oral (05/27/23 1823)  Respirations: 15 (05/29/23 0721)  Height: 6' 1\" (185 4 cm) (05/26/23 0537)  Weight - Scale: 132 kg (291 lb 14 2 oz) (05/29/23 0547)  SpO2: 95 % (05/29/23 0721)      Physical exam - I had a face-to-face encounter with the patient on day of discharge  Discussion with Patient and/or Family:  The patient has been advised to return to the ER immediately if any symptoms recur or worsen  Discharge instructions/Information to Patient and/or Family:   See after visit summary for information provided to patient and/or family  Provisions for Follow-Up Care:  See after visit summary for information related to follow-up care and any pertinent home health orders  Disposition:   Home      Discharge Medications:  See after visit summary for reconciled discharge medications provided to patient and/or family  Discharge Statement:  I spent 38 minutes discharging the patient  This time was spent on the day of discharge  I had direct contact with the patient on the day of discharge  Greater than 50% of the total time was spent examining patient, answering all patient questions, arranging and discussing plan of care with patient as well as directly providing post-discharge instructions  Additional time then spent on discharge activities  ** Please Note: This note is constructed using a voice recognition dictation system  An occasional wrong word/phrase or “sound-a-like” substitution may have been picked up by dictation device due to the inherent limitations of voice recognition software  Read the chart carefully and recognize, using reasonable context, where substitutions may have occurred  **      "

## 2023-05-29 NOTE — OCCUPATIONAL THERAPY NOTE
Occupational Therapy Evaluation     Patient Name: Julia Peacock  WMJAI'U Date: 5/29/2023  Problem List  Principal Problem:    Acute kidney injury superimposed on stage 3 chronic kidney disease   Active Problems:    Type 2 diabetes mellitus with diabetic neuropathy    Acute on chronic anemia    Paroxysmal atrial fibrillation     History of AVR    History of ventricular tachycardia    Acute on chronic diastolic CHF    Venous ulcer of right ankle    Morbid obesity    Hyperlipidemia    History of venous thromboembolism    Melena    Past Medical History  Past Medical History:   Diagnosis Date    Anemia     Arthritis     Atrial fibrillation ([facility] [address] )     Basal cell carcinoma 03/22/2022    Tip of Nose    CHF (congestive heart failure) (HCC)     Diabetes mellitus ([facility] [address] )     Niddm    DVT (deep vein thrombosis) in pregnancy 1966    not in pregnancy    DVT (deep venous thrombosis) ([facility] [address] ) 1966    Dyslipidemia     Encephalopathy acute 11/4/2022    GERD (gastroesophageal reflux disease)     Hyperlipidemia     Hypertension     Hypomagnesemia 10/19/2022    Irregular heart beat     Afib    Morbid obesity due to excess calories ([facility] [address] )     Resolved 9/2/2014     Pulmonary embolism (HCC)     Sepsis (HCC)     Squamous cell skin cancer 07/30/2020    Left posterior scalp    Visual impairment      Past Surgical History  Past Surgical History:   Procedure Laterality Date    AORTIC VALVE REPLACEMENT      CARDIAC DEFIBRILLATOR PLACEMENT  04/2014    CARDIAC SURGERY  02/2014    AVR    COLONOSCOPY      INSERT / REPLACE / REMOVE PACEMAKER      IR ASPIRATION BAKERS CYST  3/8/2023    IR CHOLECYSTOSTOMY TUBE CHECK/CHANGE/REPOSITION/REINSERTION/UPSIZE  10/13/2022    IR CHOLECYSTOSTOMY TUBE CHECK/CHANGE/REPOSITION/REINSERTION/UPSIZE  10/19/2022    IR CHOLECYSTOSTOMY TUBE CHECK/CHANGE/REPOSITION/REINSERTION/UPSIZE  10/27/2022    IR CHOLECYSTOSTOMY TUBE CHECK/CHANGE/REPOSITION/REINSERTION/UPSIZE  11/7/2022    IR CHOLECYSTOSTOMY TUBE CHECK/CHANGE/REPOSITION/REINSERTION/UPSIZE  11/10/2022    IR CHOLECYSTOSTOMY TUBE CHECK/CHANGE/REPOSITION/REINSERTION/UPSIZE  12/1/2022    IR CHOLECYSTOSTOMY TUBE CHECK/CHANGE/REPOSITION/REINSERTION/UPSIZE  1/6/2023    IR CHOLECYSTOSTOMY TUBE CHECK/CHANGE/REPOSITION/REINSERTION/UPSIZE  1/24/2023    IR CHOLECYSTOSTOMY TUBE CHECK/CHANGE/REPOSITION/REINSERTION/UPSIZE  3/15/2023    IR CHOLECYSTOSTOMY TUBE PLACEMENT  9/1/2022    IR DRAINAGE TUBE CHECK AND/OR REMOVAL  3/22/2023    IR DRAINAGE TUBE CHECK AND/OR REMOVAL  4/10/2023    IR DRAINAGE TUBE PLACEMENT  4/6/2023    JOINT REPLACEMENT Left     LTKR    MOHS SURGERY  07/30/2020    Left posterior scalp, Dr Shweta Wright  04/18/2022    [address]  [place] Tip of Nose- Dr Pati Ramon DIVJ&STRIP LONG SAPH SAPHFEM Mari Mailman Right 8/17/2018    Procedure: LEG PERFORATED INJECTION SCLEROTHERAPY;  Surgeon: Aleida Hendrix MD;  Location: [facility];  Service: Vascular    REPLACEMENT TOTAL KNEE Right     TOTAL KNEE ARTHROPLASTY Left     VASCULAR SURGERY      VENA CAVA FILTER PLACEMENT      Interruption inferior vena cava, Spring filter, placement    WISDOM TOOTH EXTRACTION          05/29/23 1000   OT Last Visit   OT Visit Date 05/29/23   Note Type   Note type Evaluation   Pain Assessment   Pain Assessment Tool 0-10   Pain Score No Pain   Restrictions/Precautions   Weight Bearing Precautions Per Order No   Other Precautions Multiple lines; Fall Risk   Home Living   Type of Home House  ([address] with 9STE)   Home Layout Able to live on main level with bedroom/bathroom; Two level   Bathroom Shower/Tub Walk-in shower   Bathroom Toilet Standard   Bathroom Equipment Grab bars in shower; Shower chair;Grab bars around [employer]   (rollator)   Additional Comments Resides with supportive wife and daughter  FFSU   Prior Function   Level of [place] Independent with ADLs; Needs assistance with IADLS   Lives With Family   IADLs Independent with driving;Family/Friend/Other provides meals   Falls in the last 6 months 0   Vocational Full time employment   Lifestyle   Autonomy Pt reports being I with ADLs, requires assist with IADLs, and I with functional mobility with use of rolaltor for long distances  Pt is a    Reciprocal Relationships supportive wife and daughter   Service to Others works as a    ADL   Eating Assistance 5  [address] [facility] 5  Supervision/Setup   [address] 4  Minimal Assistance   [address] 2  [address] 1  Total [address]  1  Total Assistance   Bed Mobility   Additional Comments Pt received sitting on toilet  bed mobility not assessed   Transfers   Sit to Stand 4  Minimal assistance   Additional items Assist x 1; Increased time required   Stand to Sit 4  Minimal assistance   Additional items Assist x 1; Increased time required;Verbal cues   Toilet transfer 4  Minimal assistance   Additional items Assist x 1; Increased time required;Standard toilet   Functional Mobility   Functional Mobility 4  Minimal assistance   Additional Comments CGA   Balance   Static Sitting Good   Dynamic Sitting Fair   Static Standing Fair -   Dynamic Standing Poor +   Ambulatory Poor +   Activity Tolerance   Activity Tolerance Patient tolerated treatment well   Medical Staff Made Aware PT Mala   Nurse Made Aware Pt cleared by JEET Bassett for OT evaluation and OOB mobility   RUE Assessment   RUE Assessment   (observed functionally  gen weak)   LUE Assessment   LUE Assessment   (observed functionally  gen weak)   Psychosocial   Psychosocial (WDL) WDL   Cognition   Arousal/Participation Alert; Responsive; Cooperative   Attention Within functional limits   Orientation Level Oriented X4   Memory Within functional limits   Following Commands Follows all commands and directions without difficulty   Comments pt is very pleasant and cooperative throughout evaluation  May have higher level cognitive deficits related to age, but nothing overt noted at this time   Assessment   Limitation Decreased ADL status; Decreased UE strength;Decreased endurance;Decreased high-level ADLs; Decreased self-care trans   Prognosis Fair   Assessment Pt is an 81year old male seen for initial OT evaluation s/p admission to [facility] with worsening fatigue, SOB with exertion, dark stool  Additional active problems include: acute on chronic diastolic CHF, melena, acute on acute on chronic anemia, paroxysmal atrial fibrillation, DM with neuropathy, h/o AVR, morbid obesity, h/o ventricular tachycardia, h/o venous thromboembolism, venous ulcer of R ankle, HLD  Pt admitted for REMA on CKD  Pt resides with his supportive wife and daughter in a bilevel home with FFSU and 9STE  Pt reports being I with ADLs, requires assist with IADLs, and I with functional mobility with use of rollator for longer distances  Pt is a  and works as a   Pt is currently demonstrating the following occupational deficits: UB ADLs with Javier-set-up, LB ADLs with max-totalA, functional transfers and mobility with Javier  Factors currently limiting pt's independence in these areas include: generalized weakness, endurance, balance, functional mobility  From an OT standpoint, recommend home OT and inc family support upon d/c  Pt is to continue to benefit from skilled occupational therapy services while in the hospital to maximize functioning and independence with daily activities  See below for OT goals to be addressed 2-3x/wk  Goals   Patient Goals to go home and be independent   Plan   Treatment Interventions ADL retraining;Functional transfer training;UE strengthening/ROM; Endurance training;Patient/family training;Equipment evaluation/education; Compensatory technique education;Continued evaluation; Activityengagement   Goal Expiration Date 06/09/23   OT Treatment Day 1 OT Frequency 2-3x/wk   Recommendation   OT Discharge Recommendation Home with home health rehabilitation   AM-PAC Daily Activity Inpatient   Lower Body Dressing 2   Bathing 2   Toileting 1   Upper Body Dressing 3   Grooming 3   Eating 4   Daily Activity Raw Score 15   Daily Activity Standardized Score (Calc for Raw Score >=11) 34 69   AM-PAC Applied Cognition Inpatient   Following a Speech/Presentation 3   Understanding Ordinary Conversation 4   Taking Medications 3   Remembering Where Things Are Placed or Put Away 3   Remembering List of 4-5 Errands 3   Taking Care of Complicated Tasks 3   Applied Cognition Raw Score 19   Applied Cognition Standardized Score 39 77     Goals:  Pt will complete all self care tasks with Ora with use of DME/AD as appropriate  Pt will complete functional transfers on/off all surfaces with Ora with use of DME/AD as appropriate and with good safety/balance  Pt will complete household level functional mobility with Ora with use of DME as appropriate and with good safety, balance and endurance      Nidia Short, MOT, OTR/L, CSRS

## 2023-05-29 NOTE — PROGRESS NOTES
"Cardiology Progress Note - Silvia Pop 81 y o  male MRN: [id number]    Unit/Bed#: [facility] 813-01 Encounter: [id number]    Hospital Problems:  Principal Problem:    Acute kidney injury superimposed on stage 3 chronic kidney disease   Active Problems:    Type 2 diabetes mellitus with diabetic neuropathy    Acute on chronic anemia    Paroxysmal atrial fibrillation     History of AVR    History of ventricular tachycardia    Acute on chronic diastolic CHF    Venous ulcer of right ankle    Morbid obesity    Hyperlipidemia    History of venous thromboembolism    Melena      Assessment & Plan:    1  ADHF -slightly hypervolemic but much improved  Standing scale weight 5/25/2023 was 319 pounds, today 291 pounds  He feels back at his baseline  Creatinine is overall increased from prior baseline of around 1 4-1 5, but this may be his new baseline today at 1 8     -Change diuretic to torsemide 40 mg p o  twice daily with supplemental potassium 20 mEq twice daily   - hold home metolazone on discharge, will see how he diureses on torsemide 40 mg BID as above   - check BMP 1 week post-discharge  2  Acute blood loss anemia -likely due to GI bleed in setting of supratherapeutic INR  Hemoglobin today 8 0 g/dL after 1 unit PRBC yesterday  INR in therapeutic range  3  AF - continue sotalol, warfarin  Had supratherapeutic INR with GIB  GI performed push enteroscopy now status post APC of angioectasia; GI cleared for resumption of [address]  4  Bioprosthetic AVR  5  pHTN  6  HTN     Stable for discharge from cardiology perspective  Will arrange for close out-patient follow-up  Subjective:   Patient seen and examined  He reports feeling at his baseline now  Objective:     Vitals: Blood pressure 103/51, pulse 64, temperature 98 °F (36 7 °C), resp  rate 15, height 6' 1\" (1 854 m), weight 132 kg (291 lb 14 2 oz), SpO2 95 %  , Body mass index is 38 51 kg/m² ,   Orthostatic Blood Pressures    Flowsheet Row Most Recent Value   Blood " Pressure 103/51 filed at 05/29/2023 4951   Patient Position - Orthostatic VS Lying filed at 05/27/2023 1452            Intake/Output Summary (Last 24 hours) at 5/29/2023 0900  Last data filed at 5/29/2023 0844  Gross per 24 hour   Intake 300 ml   Output 3475 ml   Net -3175 ml       Physical Exam:    General: Rio Zarco is a morbidly obese elderly male, in no acute distress, sitting comfortably  HEENT: moist mucous membranes, EOMI  Neck:  Unable to assess JVD, supple, trachea midline  Cardiovascular: unremarkable S1/S2, regular rate and rhythm, 2/6 SM  Pulmonary: normal respiratory effort, CTAB  Abdomen: soft and nondistended  Extremities: +2-3 lower extremity edema, venous stasis dermatits  Warm and well perfused extremities    Neuro: no focal motor deficits, AAOx3 (person, place, time)  Psych: Normal mood and affect, cooperative        Medications:      Current Facility-Administered Medications:   •  acetaminophen (TYLENOL) tablet 650 mg, 650 mg, Oral, Q4H PRN, Estrella Rayo MD  •  albuterol (PROVENTIL HFA,VENTOLIN HFA) inhaler 2 puff, 2 puff, Inhalation, Q6H PRN, Estrella Rayo MD  •  atorvastatin (LIPITOR) tablet 40 mg, 40 mg, Oral, After Callie Kaiser MD, 40 mg at 05/28/23 1805  •  ferrous sulfate tablet 325 mg, 325 mg, Oral, Daily With Breakfast, Master Toledo MD, 325 mg at 05/29/23 0759  •  fluticasone (FLONASE) 50 mcg/act nasal spray 2 spray, 2 spray, Each Nare, Daily PRN, Estrella Rayo MD  •  gabapentin (NEURONTIN) capsule 100 mg, 100 mg, Oral, Daily, Emeterio Cifuentes MD, 100 mg at 05/29/23 0800  •  insulin lispro (HumaLOG) 100 units/mL subcutaneous injection 1-5 Units, 1-5 Units, Subcutaneous, HS, Emeterio Cifuentes MD  •  insulin lispro (HumaLOG) 100 units/mL subcutaneous injection 1-6 Units, 1-6 Units, Subcutaneous, TID AC **AND** Fingerstick Glucose (POCT), , , TID AC, Emeterio Cifuentes MD  •  lidocaine (LMX) 4 % cream, , Topical, Once, [employer], CRNP  •  melatonin tablet 3 mg, 3 mg, Oral, HS, Rossana Cheema PA-C, 3 mg at 05/28/23 2118  •  ondansetron (ZOFRAN) injection 4 mg, 4 mg, Intravenous, Q6H PRN, Rachel Abdi MD  •  pantoprazole (PROTONIX) EC tablet 40 mg, 40 mg, Oral, Early Morning, Emeterio Cifuentes MD, 40 mg at 05/29/23 0506  •  potassium chloride (K-DUR,KLOR-CON) CR tablet 20 mEq, 20 mEq, Oral, BID, Denton Tipton MD  •  sodium hypochlorite (DAKIN'S QUARTER-STRENGTH) 0 125 percent topical solution, , Irrigation, Daily, [employer], ODESSA, 274 application   at 05/28/23 1606  •  sotalol (BETAPACE) tablet 80 mg, 80 mg, Oral, Daily, Rachel Abdi MD, 80 mg at 05/28/23 0803  •  torsemide (DEMADEX) tablet 40 mg, 40 mg, Oral, BID, Skinny De La Cruz MD  •  warfarin (COUMADIN) tablet 4 mg, 4 mg, Oral, Once per day on Sun Mon Wed Fri, Surendra Altamirano MD, 4 mg at 05/28/23 2116  •  warfarin (COUMADIN) tablet 6 mg, 6 mg, Oral, Once per day on Tue Thu Sat, Surendra Altamirano MD, 6 mg at 05/27/23 2025    Facility-Administered Medications Ordered in Other Encounters:   •  cefTRIAXone (ROCEPHIN) 2,000 mg in dextrose 5 % 50 mL IVPB, 2,000 mg, Intravenous, Once, Yue Rodriguez MD  •  sodium chloride 0 9 % infusion, 75 mL/hr, Intravenous, Continuous, Yue Rodriguez MD, Stopped at 05/26/23 1426     Labs & Results:        Results from last 7 days   Lab Units 05/29/23  0546 05/28/23  0600 05/27/23  0440   HEMATOCRIT % 26 9* 25 4* 22 3*   HEMOGLOBIN g/dL 8 5* 8 0* 6 9*   PLATELETS Thousands/uL 264 270 252   WBC Thousand/uL 5 59 6 60 6 53         Results from last 7 days   Lab Units 05/29/23  0546 05/28/23  0600 05/27/23  0440 05/25/23  0535 05/24/23  1609   ALK PHOS U/L  --   --   --   --  103   ALT U/L  --   --   --   --  21   AST U/L  --   --   --   --  34   BUN mg/dL 32* 34* 38*   < > 45*   CALCIUM mg/dL 9 1 9 0 8 7   < > 8 7   CHLORIDE mmol/L 102 104 105   < > 107   CO2 mmol/L 32 27 29   < > 24   CREATININE mg/dL 1 81* 1 70* 1 75*   < > 2 06*   POTASSIUM mmol/L 3 8 3 6 3 3*   < > 4 3    < > = values "in this interval not displayed  Results from last 7 days   Lab Units 05/29/23  0546 05/28/23  0758 05/27/23  0440 05/26/23  0527 05/24/23  1609   INR  2 20* 2 14* 2 40*   < > 2 95*   PTT seconds  --   --   --   --  48*    < > = values in this interval not displayed  Results from last 7 days   Lab Units 05/29/23  0546 05/28/23  0758 05/27/23  0440   MAGNESIUM mg/dL 2 2 2 2 1 7          This note was completed in part utilizing [employer] direct voice recognition software  Grammatical errors, random word insertion, spelling mistakes, occasional wrong word or \"sound-alike\" substitutions and incomplete sentences may be an occasional consequence of the system secondary to software limitations, ambient noise and hardware issues  At the time of dictation, efforts were made to edit, clarify and /or correct errors  Please read the chart carefully and recognize, using context, where substitutions have occurred  If you have any questions or concerns about the context, text or information contained within the body of this dictation, please contact myself, the provider, for further clarification      "

## 2023-05-29 NOTE — PLAN OF CARE
Problem: OCCUPATIONAL THERAPY ADULT  Goal: Performs self-care activities at highest level of function for planned discharge setting  See evaluation for individualized goals  Description: Treatment Interventions: ADL retraining, Functional transfer training, UE strengthening/ROM, Endurance training, Patient/family training, Equipment evaluation/education, Compensatory technique education, Continued evaluation, Activityengagement          See flowsheet documentation for full assessment, interventions and recommendations  Note: Limitation: Decreased ADL status, Decreased UE strength, Decreased endurance, Decreased high-level ADLs, Decreased self-care trans  Prognosis: Fair  Assessment: Pt is an 81year old male seen for initial OT evaluation s/p admission to [facility] with worsening fatigue, SOB with exertion, dark stool  Additional active problems include: acute on chronic diastolic CHF, melena, acute on acute on chronic anemia, paroxysmal atrial fibrillation, DM with neuropathy, h/o AVR, morbid obesity, h/o ventricular tachycardia, h/o venous thromboembolism, venous ulcer of R ankle, HLD  Pt admitted for REMA on CKD  Pt resides with his supportive wife and daughter in a bilevel home with FFSU and 9STE  Pt reports being I with ADLs, requires assist with IADLs, and I with functional mobility with use of rollator for longer distances  Pt is a  and works as a   Pt is currently demonstrating the following occupational deficits: UB ADLs with Javier-set-up, LB ADLs with max-totalA, functional transfers and mobility with Javier  Factors currently limiting pt's independence in these areas include: generalized weakness, endurance, balance, functional mobility  From an OT standpoint, recommend home OT and inc family support upon d/c  Pt is to continue to benefit from skilled occupational therapy services while in the hospital to maximize functioning and independence with daily activities    See below for OT goals to be addressed 2-3x/wk       OT Discharge Recommendation: Home with home health rehabilitation

## 2023-05-29 NOTE — PHYSICAL THERAPY NOTE
[facility]    Vaginal Delivery Note    Car Olson Patient Status:  Inpatient    1989 MRN [id number]   Location [address]  Attending Elvira Polanco MD   Hosp Day # 0 PCP Muriel Betancourt MD     [facility] Physical Therapy Evaluation     Patient's Name: Emre Quevedo    Admitting Diagnosis  Shortness of breath [R06 02]  CHF (congestive heart failure) ([facility] ) [I50 9]  REMA (acute kidney injury) ([facility] ) [N17 9]    Problem List  Patient Active Problem List   Diagnosis    Obesity, unspecified obesity severity, unspecified obesity type    Type 2 diabetes mellitus with diabetic neuropathy    Hydrocele    Chronic anticoagulation    Acute on chronic anemia    Paroxysmal atrial fibrillation     History of AVR    PVD (peripheral vascular disease) ([facility])    Thrombophlebitis    History of ventricular tachycardia    Edema    Acute on chronic diastolic CHF    Venous ulcer of right ankle    Peripheral venous insufficiency    Ventricular tachycardia    Secondary lymphedema    CKD (chronic kidney disease) stage 3, GFR 30-59 ml/min ([facility])    Morbid obesity    Benign prostatic hyperplasia with lower urinary tract symptoms    Acute cholecystitis    Acute kidney injury superimposed on stage 3 chronic kidney disease     Cardiomegaly    Chronic bilateral low back pain without sciatica    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    Dyslipidemia    Endocarditis    Hyperlipidemia    Osteoarthritis, knee    Renal cyst    Status post right knee replacement    Swelling of limb    BPH (benign prostatic hyperplasia)    History of venous thromboembolism    Acute on chronic cholecystitis    Cholecystostomy care ([facility])    Gout    Calculus of gallbladder    Extrahepatic biloma    Pneumonia of both lungs due to infectious organism    COVID-19    Pleural effusion on right    Melena    Pulmonary embolism ([facility])    CHF (congestive heart failure) ([facility] )       Past Medical History  Past Medical History:   Diagnosis Date    Anemia     Arthritis     Atrial fibrillation ([facility] )     Basal cell carcinoma 03/22/2022    Tip of Nose    CHF (congestive heart failure) ([facility])     Diabetes mellitus ([facility])     Niddm    DVT (deep vein thrombosis) in pregnancy 1966    not in pregnancy    DVT (deep venous thrombosis) ([facility] ) 1966    Dyslipidemia     Encephalopathy acute 11/4/2022    GERD (gastroesophageal reflux disease)     Hyperlipidemia     Hypertension     Hypomagnesemia 10/19/2022    Irregular heart beat     Afib    Morbid obesity due to excess calories ([facility] )     Resolved 9/2/2014     Pulmonary embolism (HCC)     Sepsis ([facility] )     Squamous cell skin cancer 07/30/2020    Left posterior scalp    Visual impairment        Past Surgical History  Past Surgical History:   Procedure Laterality Date    AORTIC VALVE REPLACEMENT      CARDIAC DEFIBRILLATOR PLACEMENT  04/2014    CARDIAC SURGERY  02/2014    AVR    COLONOSCOPY      INSERT / REPLACE / REMOVE PACEMAKER      IR ASPIRATION BAKERS CYST  3/8/2023    IR CHOLECYSTOSTOMY TUBE CHECK/CHANGE/REPOSITION/REINSERTION/UPSIZE  10/13/2022    IR CHOLECYSTOSTOMY TUBE CHECK/CHANGE/REPOSITION/REINSERTION/UPSIZE  10/19/2022    IR CHOLECYSTOSTOMY TUBE CHECK/CHANGE/REPOSITION/REINSERTION/UPSIZE  10/27/2022    IR CHOLECYSTOSTOMY TUBE CHECK/CHANGE/REPOSITION/REINSERTION/UPSIZE  11/7/2022    IR CHOLECYSTOSTOMY TUBE CHECK/CHANGE/REPOSITION/REINSERTION/UPSIZE  11/10/2022    IR CHOLECYSTOSTOMY TUBE CHECK/CHANGE/REPOSITION/REINSERTION/UPSIZE  12/1/2022    IR CHOLECYSTOSTOMY TUBE CHECK/CHANGE/REPOSITION/REINSERTION/UPSIZE  1/6/2023    IR CHOLECYSTOSTOMY TUBE CHECK/CHANGE/REPOSITION/REINSERTION/UPSIZE  1/24/2023    IR CHOLECYSTOSTOMY TUBE CHECK/CHANGE/REPOSITION/REINSERTION/UPSIZE  3/15/2023    IR CHOLECYSTOSTOMY TUBE PLACEMENT  9/1/2022    IR DRAINAGE TUBE CHECK AND/OR REMOVAL  3/22/2023    IR DRAINAGE TUBE CHECK AND/OR REMOVAL  4/10/2023    IR DRAINAGE TUBE PLACEMENT  4/6/2023    JOINT REPLACEMENT Left     LTKR    MOHS SURGERY  07/30/2020    Left posterior scalp, Dr Leticia Wong  04/18/2022    [address] Tip of Nose- Dr Maria Eugenia Suarez DIVKATHRYN&STRIP LONG [address] [facility] Right 8/17/2018    Procedure: LEG PERFORATED INJECTION SCLEROTHERAPY;  Surgeon: Olivia Preston MD;  Location: [facility]  MAIN OR;  Service: Vascular    REPLACEMENT TOTAL KNEE Right     TOTAL KNEE ARTHROPLASTY Left     VASCULAR SURGERY      VENA CAVA FILTER PLACEMENT      Interruption inferior vena cava, [place] filter, placement    WISDOM TOOTH EXTRACTION            05/29/23 1045   PT Last Visit   PT Visit Date 05/29/23   Note Type   Note type Evaluation   Pain Assessment   Pain Assessment Tool 0-10   Pain Score No Pain   Restrictions/Precautions   Weight Bearing Precautions Per Order No   Braces or Orthoses   (none)   Other Precautions Fall Risk; Chair Alarm  (masimo)   Home Living   [facility] with bedroom/bathroom; Performs ADLs on one level   Bathroom Shower/Tub Walk-in shower   Bathroom Toilet Standard   Bathroom Equipment Grab bars in shower; Shower chair;Grab bars around toilet   Home Equipment Other (Comment)  (rollator)   Additional Comments Prior to this admission patient resided with spouse and dght in a bilevel home (patient enters home through garage w/o RADHA and then functions on this level)  At his baseline he is I with mobility (RW, denies falls) and ADLs and receives assist with iADLS  +  and works as   Prior Function   Level of [place] Independent with ADLs; Needs assistance with IADLS; Independent with functional mobility   Lives With Family   IADLs Independent with driving;Family/Friend/Other provides meals   Falls in the last 6 months 0   Vocational Full time employment   General   Additional Pertinent History 81 y o  male admitted to [facility] on 5/24/2023 with symptoms of: fatigue and dyspnea  Diagnosis includes REMA     Family/Caregiver Present No   Cognition   Overall Cognitive Status WFL   Arousal/Participation Alert   Attention Within functional limits   Orientation Level Oriented X4   Memory Within functional limits   Following Commands Follows all commands and directions "without difficulty   Comments very pleasant, cooperative   Subjective   Subjective \"I have the greatest home health lady\"   RUE Assessment   RUE Assessment WFL   LUE Assessment   LUE Assessment WFL   RLE Assessment   RLE Assessment WFL  (4-/5 grossly)   LLE Assessment   LLE Assessment WFL  (4-/5 grossly)   Bed Mobility   Supine to Sit Unable to assess   Sit to Supine Unable to assess   Additional Comments patient received seated on toilet and was sitting in chair post eval with alarm active   Transfers   Toilet transfer   (contact guard; wide ROWENA; using b/l HR)   Additional Comments sit-->stand with HRs   Ambulation/Elevation   Gait pattern Excessively slow; Short stride; Wide ROWENA; Decreased foot clearance   Gait Assistance 5  Supervision   Additional items Verbal cues   Assistive Device Rolling walker   Distance 10 feet x 2 within room   Balance   Static Sitting Good   Static Standing Fair +   Ambulatory Fair   Endurance Deficit   Endurance Deficit Yes   Endurance Deficit Description LE edema, gross weakness   Activity Tolerance   Activity Tolerance Patient tolerated treatment well   Medical Staff Made Aware This high complexity evaluation was performed with an occupational therapist due to the patient's co-morbidities, clinically unstable presentation, and present impairments which are a regression from the patient's baseline  Nurse Made Aware mariangel to see per RN Oral Dusky   Assessment   Prognosis Good   Problem List Decreased strength;Decreased endurance; Impaired balance;Decreased mobility   Assessment PT completed evaluation of 81 y o  male admitted to [facility] on 5/24/2023 with symptoms of: fatigue and dyspnea  Diagnosis includes REMA  Current status instabilities include continuous O2/HR monitoring, falls risk, bed/chair alarms, and a regression in functional status from baseline  PMH is significant for CKD, CHF, obesity, ICD, afib, AVR   Prior to this admission patient resided with spouse and dght in a " bilevel home (patient enters home through garage w/o RADHA and then functions on this level)  At his baseline he is I with mobility (RW, denies falls) and ADLs and receives assist with iADLS  +  and works as   Patient presents at time of PT evaluation functioning below baseline and currently w/ overall mobility deficits 2* to: LE edema, decreased endurance, gait deviations, decreased activity tolerance and fall risk  During PT evaluation, patient currently is requiring contact guard assist for standing transfers and supervision for ambulation w/ RW  T    his patient is recommended for d/c home with HHPT  Patient will continue to benefit from continued skilled PT this admission to achieve maximal function and safety  Goals   Patient Goals to go home   LTG Expiration Date 06/12/23   Long Term Goal #1 PT completed evaluation of 81 y o  male admitted to [facility] on 5/24/2023 with symptoms of: fatigue and dyspnea  Diagnosis includes REMA  Current status instabilities include continuous O2/HR monitoring, falls risk, bed/chair alarms, and a regression in functional status from baseline  PMH is significant for CKD, CHF, obesity, ICD, afib, AVR  Prior to this admission patient resided with spouse and dght in a bilevel home (patient enters home through garage w/o RADHA and then functions on this level)  At his baseline he is I with mobility (RW, denies falls) and ADLs and receives assist with iADLS  +  and works as   Patient presents at time of PT evaluation functioning below baseline and currently w/ overall mobility deficits 2* to: LE edema, decreased endurance, gait deviations, decreased activity tolerance and fall risk  During PT evaluation, patient currently is requiring contact guard assist for standing transfers and supervision for ambulation w/ RW  This patient is recommended for d/c home with HHPT   Patient will continue to benefit from continued skilled PT this admission to achieve maximal function and safety  PT Treatment Day 0   Plan   Treatment/Interventions Functional transfer training;LE strengthening/ROM; Therapeutic exercise; Endurance training;Patient/family training;Equipment eval/education; Bed mobility;Gait training;OT;Spoke to nursing   PT Frequency 2-3x/wk   Recommendation   PT Discharge Recommendation Home with home health rehabilitation   [address] [facility] Mobility Inpatient   Turning in Flat Bed Without Bedrails 3   Lying on Back to Sitting on Edge of Flat Bed Without Bedrails 3   Moving Bed to Chair 3   Standing Up From Chair Using Arms 3   Walk in Room 4   Climb 3-5 Stairs With Railing 3   Basic Mobility Inpatient Raw Score 19   Basic Mobility Standardized Score 42 48   Highest Level Of Mobility   JH-HLM Goal 6: Walk 10 steps or more   JH-HLM Achieved 6: Walk 10 steps or more     The patient's AM-PAC Basic Mobility Inpatient Standardized Score is less than 42 9, suggesting this patient may benefit from discharge to post-acute rehabilitation services  Please also refer to the recommendation of the Physical Therapist for safe discharge planning        Erick Suarez, PT, DPT

## 2023-05-29 NOTE — PLAN OF CARE
Problem: PHYSICAL THERAPY ADULT  Goal: Performs mobility at highest level of function for planned discharge setting  See evaluation for individualized goals  Description: Treatment/Interventions: Functional transfer training, LE strengthening/ROM, Therapeutic exercise, Endurance training, Patient/family training, Equipment eval/education, Bed mobility, Gait training, OT, Spoke to nursing  Equipment Recommended: Aiden Whitfield       See flowsheet documentation for full assessment, interventions and recommendations  Note: Prognosis: Good  Problem List: Decreased strength, Decreased endurance, Impaired balance, Decreased mobility  Assessment: PT completed evaluation of 81 y o  male admitted to [facility] on 5/24/2023 with symptoms of: fatigue and dyspnea  Diagnosis includes REMA  Current status instabilities include continuous O2/HR monitoring, falls risk, bed/chair alarms, and a regression in functional status from baseline  PMH is significant for CKD, CHF, obesity, ICD, afib, AVR  Prior to this admission patient resided with spouse and dght in a bilevel home (patient enters home through garage w/o RADHA and then functions on this level)  At his baseline he is I with mobility (RW, denies falls) and ADLs and receives assist with iADLS  +  and works as   Patient presents at time of PT evaluation functioning below baseline and currently w/ overall mobility deficits 2* to: LE edema, decreased endurance, gait deviations, decreased activity tolerance and fall risk  During PT evaluation, patient currently is requiring contact guard assist for standing transfers and supervision for ambulation w/ RW  This patient is recommended for d/c home with HHPT  Patient will continue to benefit from continued skilled PT this admission to achieve maximal function and safety  PT Discharge Recommendation: Home with home health rehabilitation    See flowsheet documentation for full assessment

## 2023-05-29 NOTE — PLAN OF CARE
Problem: MOBILITY - ADULT  Goal: Maintain or return to baseline ADL function  Description: INTERVENTIONS:  -  Assess patient's ability to carry out ADLs; assess patient's baseline for ADL function and identify physical deficits which impact ability to perform ADLs (bathing, care of mouth/teeth, toileting, grooming, dressing, etc )  - Assess/evaluate cause of self-care deficits   - Assess range of motion  - Assess patient's mobility; develop plan if impaired  - Assess patient's need for assistive devices and provide as appropriate  - Encourage maximum independence but intervene and supervise when necessary  - Involve family in performance of ADLs  - Assess for home care needs following discharge   - Consider OT consult to assist with ADL evaluation and planning for discharge  - Provide patient education as appropriate  Outcome: Progressing  Goal: Maintains/Returns to pre admission functional level  Description: INTERVENTIONS:  - Perform BMAT or MOVE assessment daily    - Set and communicate daily mobility goal to care team and patient/family/caregiver     - Collaborate with rehabilitation services on mobility goals if consulted  - Out of bed for toileting  - Record patient progress and toleration of activity level   Outcome: Progressing     Problem: PAIN - ADULT  Goal: Verbalizes/displays adequate comfort level or baseline comfort level  Description: Interventions:  - Encourage patient to monitor pain and request assistance  - Assess pain using appropriate pain scale  - Administer analgesics based on type and severity of pain and evaluate response  - Implement non-pharmacological measures as appropriate and evaluate response  - Consider cultural and social influences on pain and pain management  - Notify physician/advanced practitioner if interventions unsuccessful or patient reports new pain  Outcome: Progressing     Problem: INFECTION - ADULT  Goal: Absence or prevention of progression during hospitalization  Description: INTERVENTIONS:  - Assess and monitor for signs and symptoms of infection  - Monitor lab/diagnostic results  - Monitor all insertion sites, i e  indwelling lines, tubes, and drains  - Monitor endotracheal if appropriate and nasal secretions for changes in amount and color  - [place] appropriate cooling/warming therapies per order  - Administer medications as ordered  - Instruct and encourage patient and family to use good hand hygiene technique  - Identify and instruct in appropriate isolation precautions for identified infection/condition  Outcome: Progressing  Goal: Absence of fever/infection during neutropenic period  Description: INTERVENTIONS:  - Monitor WBC    Outcome: Progressing     Problem: DISCHARGE PLANNING  Goal: Discharge to home or other facility with appropriate resources  Description: INTERVENTIONS:  - Identify barriers to discharge w/patient and caregiver  - Arrange for needed discharge resources and transportation as appropriate  - Identify discharge learning needs (meds, wound care, etc )  - Arrange for interpretive services to assist at discharge as needed  - Refer to Case Management Department for coordinating discharge planning if the patient needs post-hospital services based on physician/advanced practitioner order or complex needs related to functional status, cognitive ability, or social support system  Outcome: Progressing     Problem: Nutrition/Hydration-ADULT  Goal: Nutrient/Hydration intake appropriate for improving, restoring or maintaining nutritional needs  Description: Monitor and assess patient's nutrition/hydration status for malnutrition  Collaborate with interdisciplinary team and initiate plan and interventions as ordered  Monitor patient's weight and dietary intake as ordered or per policy  Utilize nutrition screening tool and intervene as necessary   Determine patient's food preferences and provide high-protein, high-caloric foods as appropriate       INTERVENTIONS:  - Monitor oral intake, urinary output, labs, and treatment plans  - Assess nutrition and hydration status and recommend course of action  - Evaluate amount of meals eaten  - Assist patient with eating if necessary   - Allow adequate time for meals  - Recommend/ encourage appropriate diets, oral nutritional supplements, and vitamin/mineral supplements  - Order, calculate, and assess calorie counts as needed  - Recommend, monitor, and adjust tube feedings and TPN/PPN based on assessed needs  - Assess need for intravenous fluids  - Provide specific nutrition/hydration education as appropriate  - Include patient/family/caregiver in decisions related to nutrition  Outcome: Progressing

## 2023-05-29 NOTE — PLAN OF CARE
Problem: MOBILITY - ADULT  Goal: Maintain or return to baseline ADL function  Description: INTERVENTIONS:  -  Assess patient's ability to carry out ADLs; assess patient's baseline for ADL function and identify physical deficits which impact ability to perform ADLs (bathing, care of mouth/teeth, toileting, grooming, dressing, etc )  - Assess/evaluate cause of self-care deficits   - Assess range of motion  - Assess patient's mobility; develop plan if impaired  - Assess patient's need for assistive devices and provide as appropriate  - Encourage maximum independence but intervene and supervise when necessary  - Involve family in performance of ADLs  - Assess for home care needs following discharge   - Consider OT consult to assist with ADL evaluation and planning for discharge  - Provide patient education as appropriate  Outcome: Progressing  Goal: Maintains/Returns to pre admission functional level  Description: INTERVENTIONS:  - Perform BMAT or MOVE assessment daily    - Set and communicate daily mobility goal to care team and patient/family/caregiver  - Collaborate with rehabilitation services on mobility goals if consulted  - Perform Range of Motion 3 times a day  - Reposition patient every 2 hours    - Dangle patient 3 times a day  - Stand patient 3 times a day  - Ambulate patient 3 times a day  - Out of bed to chair 3 times a day   - Out of bed for meals 3 times a day  - Out of bed for toileting  - Record patient progress and toleration of activity level   Outcome: Progressing     Problem: PAIN - ADULT  Goal: Verbalizes/displays adequate comfort level or baseline comfort level  Description: Interventions:  - Encourage patient to monitor pain and request assistance  - Assess pain using appropriate pain scale  - Administer analgesics based on type and severity of pain and evaluate response  - Implement non-pharmacological measures as appropriate and evaluate response  - Consider cultural and social influences on pain and pain management  - Notify physician/advanced practitioner if interventions unsuccessful or patient reports new pain  Outcome: Progressing     Problem: INFECTION - ADULT  Goal: Absence or prevention of progression during hospitalization  Description: INTERVENTIONS:  - Assess and monitor for signs and symptoms of infection  - Monitor lab/diagnostic results  - Monitor all insertion sites, i e  indwelling lines, tubes, and drains  - Monitor endotracheal if appropriate and nasal secretions for changes in amount and color  - [place] appropriate cooling/warming therapies per order  - Administer medications as ordered  - Instruct and encourage patient and family to use good hand hygiene technique  - Identify and instruct in appropriate isolation precautions for identified infection/condition  Outcome: Progressing  Goal: Absence of fever/infection during neutropenic period  Description: INTERVENTIONS:  - Monitor WBC    Outcome: Progressing     Problem: DISCHARGE PLANNING  Goal: Discharge to home or other facility with appropriate resources  Description: INTERVENTIONS:  - Identify barriers to discharge w/patient and caregiver  - Arrange for needed discharge resources and transportation as appropriate  - Identify discharge learning needs (meds, wound care, etc )  - Arrange for interpretive services to assist at discharge as needed  - Refer to Case Management Department for coordinating discharge planning if the patient needs post-hospital services based on physician/advanced practitioner order or complex needs related to functional status, cognitive ability, or social support system  Outcome: Progressing     Problem: Prexisting or High Potential for Compromised Skin Integrity  Goal: Skin integrity is maintained or improved  Description: INTERVENTIONS:  - Identify patients at risk for skin breakdown  - Assess and monitor skin integrity  - Assess and monitor nutrition and hydration status  - Monitor labs   - Assess for incontinence   - Turn and reposition patient  - Assist with mobility/ambulation  - Relieve pressure over bony prominences  - Avoid friction and shearing  - Provide appropriate hygiene as needed including keeping skin clean and dry  - Evaluate need for skin moisturizer/barrier cream  - Collaborate with interdisciplinary team   - Patient/family teaching  - Consider wound care consult   Outcome: Progressing     Problem: Nutrition/Hydration-ADULT  Goal: Nutrient/Hydration intake appropriate for improving, restoring or maintaining nutritional needs  Description: Monitor and assess patient's nutrition/hydration status for malnutrition  Collaborate with interdisciplinary team and initiate plan and interventions as ordered  Monitor patient's weight and dietary intake as ordered or per policy  Utilize nutrition screening tool and intervene as necessary  Determine patient's food preferences and provide high-protein, high-caloric foods as appropriate       INTERVENTIONS:  - Monitor oral intake, urinary output, labs, and treatment plans  - Assess nutrition and hydration status and recommend course of action  - Evaluate amount of meals eaten  - Assist patient with eating if necessary   - Allow adequate time for meals  - Recommend/ encourage appropriate diets, oral nutritional supplements, and vitamin/mineral supplements  - Order, calculate, and assess calorie counts as needed  - Recommend, monitor, and adjust tube feedings and TPN/PPN based on assessed needs  - Assess need for intravenous fluids  - Provide specific nutrition/hydration education as appropriate  - Include patient/family/caregiver in decisions related to nutrition  Outcome: Progressing

## 2023-05-30 ENCOUNTER — TELEPHONE (OUTPATIENT)
Dept: CARDIOLOGY CLINIC | Facility: CLINIC | Age: 88
End: 2023-05-30

## 2023-05-30 DIAGNOSIS — K81.0 ACUTE CHOLECYSTITIS: ICD-10-CM

## 2023-05-30 RX ORDER — SOTALOL HYDROCHLORIDE 80 MG/1
80 TABLET ORAL DAILY
Qty: 60 TABLET | Refills: 5 | Status: SHIPPED | OUTPATIENT
Start: 2023-05-30

## 2023-05-31 ENCOUNTER — HOME CARE VISIT (OUTPATIENT)
Dept: HOME HEALTH SERVICES | Facility: HOME HEALTHCARE | Age: 88
End: 2023-05-31

## 2023-05-31 VITALS
RESPIRATION RATE: 18 BRPM | HEART RATE: 90 BPM | TEMPERATURE: 97.6 F | OXYGEN SATURATION: 97 % | SYSTOLIC BLOOD PRESSURE: 118 MMHG | DIASTOLIC BLOOD PRESSURE: 62 MMHG

## 2023-05-31 LAB — INR PPP: 1.7 (ref 0.84–1.19)

## 2023-06-02 ENCOUNTER — OFFICE VISIT (OUTPATIENT)
Dept: CARDIOLOGY CLINIC | Facility: CLINIC | Age: 88
End: 2023-06-02

## 2023-06-02 ENCOUNTER — HOME CARE VISIT (OUTPATIENT)
Dept: HOME HEALTH SERVICES | Facility: HOME HEALTHCARE | Age: 88
End: 2023-06-02

## 2023-06-02 VITALS
TEMPERATURE: 98 F | HEART RATE: 72 BPM | OXYGEN SATURATION: 98 % | SYSTOLIC BLOOD PRESSURE: 118 MMHG | RESPIRATION RATE: 18 BRPM | DIASTOLIC BLOOD PRESSURE: 72 MMHG

## 2023-06-02 VITALS
OXYGEN SATURATION: 97 % | BODY MASS INDEX: 37.84 KG/M2 | SYSTOLIC BLOOD PRESSURE: 100 MMHG | HEIGHT: 73 IN | DIASTOLIC BLOOD PRESSURE: 58 MMHG | HEART RATE: 85 BPM | WEIGHT: 285.5 LBS

## 2023-06-02 DIAGNOSIS — Z09 HOSPITAL DISCHARGE FOLLOW-UP: ICD-10-CM

## 2023-06-02 DIAGNOSIS — I50.33 ACUTE ON CHRONIC HEART FAILURE WITH PRESERVED EJECTION FRACTION (HCC): Primary | ICD-10-CM

## 2023-06-02 NOTE — PROGRESS NOTES
Heart Failure Outpatient Visit    Doron Santos 81 y o  male   MRN: [id number]  Encounter: [id number]    Assessment:  Patient Active Problem List    Diagnosis Date Noted   • Pulmonary embolism 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   • Pneumonia of both lungs due to infectious organism 04/05/2023   • COVID-19 04/05/2023   • Pleural effusion on right 04/05/2023   • Calculus of gallbladder 03/08/2023   • Cholecystostomy care ([facility] ) 11/21/2022   • Gout 11/21/2022   • Acute on chronic cholecystitis 11/04/2022   • Acute cholecystitis 08/30/2022   • Benign prostatic hyperplasia with lower urinary tract symptoms 08/09/2022   • Morbid obesity    • CKD (chronic kidney disease) stage 3, GFR 30-59 ml/min ([facility] ) 05/07/2022   • Acute kidney injury superimposed on stage 3 chronic kidney disease  11/18/2021   • Osteoarthritis, knee 11/15/2021   • Status post right knee replacement 11/15/2021   • Hyperlipidemia 03/29/2021   • Swelling of limb 02/04/2021   • History of venous thromboembolism 09/10/2020   • Secondary lymphedema 09/24/2018   • Venous ulcer of right ankle 07/11/2018   • Acute on chronic diastolic CHF 53/77/9286   • History of AVR 03/22/2018   • Thrombophlebitis 03/22/2018   • History of ventricular tachycardia 03/22/2018   • Chronic bilateral low back pain without sciatica 04/24/2017   • BPH (benign prostatic hyperplasia) 04/20/2017   • Obesity, unspecified obesity severity, unspecified obesity type 04/04/2017   • Hydrocele 04/04/2017   • Chronic anticoagulation 04/04/2017   • Acute on chronic anemia 04/04/2017   • Renal cyst 04/04/2017   • 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 08/11/2015   • Ventricular tachycardia 04/15/2014   • Paroxysmal atrial fibrillation  03/19/2014   • Edema 03/19/2014   • Endocarditis 03/19/2014   • Type 2 diabetes mellitus with diabetic neuropathy 08/19/2013   • Peripheral venous insufficiency 08/19/2013 • Cardiomegaly 08/19/2013   • PVD (peripheral vascular disease) ([facility] ) 04/19/2013   • Dyslipidemia 04/19/2013       Today's Plan:  • Reasonable volume status on exam   Continue current dose of torsemide  Has not recently taken metolazone  • Has not yet gotten repeat BMP ordered at discharge; will get it done on Monday  • 2 g sodium restriction, 2 L fluid restriction, daily weights reinforced  • 2-week follow-up with heart failure ANUSHA  4-week follow-up with Dr Momo Aj  • Would like [facility] going [facility]  Message sent to arrange transition  Plan:  Chronic HFpEF, LVEF 60%  Diuretic: torsemide 40 mg BID  TTE 5/8/22: LVEF 60%  Normal wall motion  RV cavity size moderately dilated, systolic function moderately reduced  SID  Bioprosthetic aortic valve  Mild to moderate MR, moderate TR, estimated RVSP 70 mmHg  Weighed 291 lbs on 5/29  Today, weighs 285 lbs  Most recent BMP from 5/29/2023: Sodium 135, potassium 3 8, creatinine 1 81, BUN 32, eGFR 33  Atrial fibrillation  AC: warfarin  Rate:  Rhythm: sotalol  Had supratherapeutic INR with GI bleed while admitted, status post push enteroscopy with GI with APC of angioectasia, cleared by GI to resume warfarin  GI bleed  Required PRBC transfusion  Underwent push enteroscopy with APC of angioectasia as above  PH  HTN  HLD  DVT  On warfarin   DM2  Bioprosthetic AV replacement s/p endocarditis   History of VT   Status post MDT dual chamber ICD  Interrogation 5/24/23: BATTERY VOLTAGE ADEQUATE (1 9 YRS)  AP-5%, -0 1%  ALL LEAD PARAMETERS WITHIN NORMAL LIMITS  NO NEW SIGNIFICANT HIGH RATE EPISODES        HPI:   Judge Tovar is a 52-year-old male with a PMH as above who presents for follow up  Follows with Dr Momo Aj  Recently hospitalized at [facility] from 5/24 to 5/29 after being referred from outpatient cardiology office for worsening fatigue, shortness of breath with exertion, and dark stool    Noted to have REMA and admitted for symptomatic anemia and diastolic heart failure exacerbation  319 pounds on admission, , chest x-ray with mild pulmonary vascular congestion and small bilateral pleural effusions  Started on Bumex 2 mg IV twice daily, which was  increased to 3 mg IV twice daily on 5/28  Supratherapeutic INR noted with GI bleed  Required PRBC transfusion  Underwent push enteroscopy with GI with subsequent APC of angioectasia, cleared to resume warfarin  Transitioned to torsemide 40 mg twice daily with potassium 20 mEq twice daily  Home dose metolazone held on discharge  291 pounds on discharge  6/2/2023: Presents today for follow-up  285 lbs today  Feeling much better  Walking well, minimal TATE, no PND, orthopnea  Occasionally weighing himself, weights have been stable at home  LE swelling much improved  Plans to get labs Monday  Would like to follow with the cardiology coumadin clinic going forward  Past Medical History:   Diagnosis Date   • Anemia    • Arthritis    • Atrial fibrillation ([facility] )    • Basal cell carcinoma 03/22/2022    Tip of Nose   • CHF (congestive heart failure) (HCC)    • Diabetes mellitus (HCC)     Niddm   • DVT (deep vein thrombosis) in pregnancy 1966    not in pregnancy   • DVT (deep venous thrombosis) ([facility] ) 1966   • Dyslipidemia    • Encephalopathy acute 11/4/2022   • GERD (gastroesophageal reflux disease)    • Hyperlipidemia    • Hypertension    • Hypomagnesemia 10/19/2022   • Irregular heart beat     Afib   • Morbid obesity due to excess calories ([address] )     Resolved 9/2/2014    • Pulmonary embolism (HCC)    • Sepsis ([facility] )    • Squamous cell skin cancer 07/30/2020    Left posterior scalp   • Visual impairment      Review of Systems   Constitutional: Negative for chills, fever and unexpected weight change  HENT: Negative for ear pain and sore throat  Eyes: Negative for pain and visual disturbance     Respiratory: Positive for shortness of breath (minimal TATE, improved from prior)  Negative for cough  Cardiovascular: Positive for leg swelling (chronic)  Negative for chest pain and palpitations  Gastrointestinal: Negative for abdominal pain and vomiting  Genitourinary: Negative for dysuria and hematuria  Musculoskeletal: Negative for arthralgias and back pain  Skin: Negative for color change and rash  Neurological: Negative for seizures and syncope  All other systems reviewed and are negative  14-point ROS completed and negative except as stated above and/or in the HPI  Allergies   Allergen Reactions   • Tramadol Other (See Comments)     intolerance       Current Outpatient Medications:   •  Acetaminophen (TYLENOL EXTRA STRENGTH PO), Take 500 mg by mouth if needed (for pain as needed)  Indications: pain as needed, Disp: , Rfl:   •  atorvastatin (LIPITOR) 40 mg tablet, Take 40 mg by mouth daily after dinner Atorvastatin Calcium 40 MG Oral Tablet Take 1 tablet daily  Refills: 0  Active , Disp: , Rfl:   •  B Complex-C (SUPER B COMPLEX PO), Take 1 capsule by mouth daily , Disp: , Rfl:   •  Cholecalciferol 25 MCG (1000 UT) capsule, Take 3,000 Units by mouth daily  Indications: Vitamin D Deficiency, Disp: , Rfl:   •  Diclofenac Sodium (VOLTAREN) 1 %, Apply 2 g topically 4 (four) times a day, Disp: , Rfl:   •  fluticasone (FLONASE) 50 mcg/act nasal spray, 2 sprays into each nostril daily as needed Shake liquid and spray, Disp: , Rfl:   •  gabapentin (NEURONTIN) 100 mg capsule, Take 100 mg by mouth daily, Disp: , Rfl:   •  Iron Combinations (NIFEREX) TABS, Take 1 tablet by mouth in the morning, Disp: , Rfl: 5  •  lubiprostone (AMITIZA) 24 mcg capsule, Take 24 mcg by mouth 2 (two) times a day with meals  Indications: Chronic Constipation of Unknown Cause, Disp: , Rfl:   •  metFORMIN (GLUCOPHAGE) 1000 MG tablet, Take 1,000 mg by mouth daily with dinner   Indications: Type 2 Diabetes, Disp: , Rfl:   •  Multiple Vitamin (MULTIVITAMINS PO), Take 1 tablet by mouth daily, Disp: , Rfl:   •  omeprazole (PriLOSEC) 20 mg delayed release capsule, Omeprazole 20 MG Oral Tablet Delayed Release Take 1 tablet daily  Refills: 0  Active, Disp: , Rfl:   •  polyethylene glycol (GLYCOLAX) 17 GM/SCOOP powder, Take 17 g by mouth 2 (two) times a day, Disp: , Rfl:   •  Potassium Chloride ER 20 MEQ TBCR, Take 20 mEq by mouth in the morning  Indications: Low Amount of Potassium in the Blood, Disp: , Rfl:   •  senna-docusate sodium (SENOKOT S) 8 6-50 mg per tablet, Take 1 tablet by mouth daily at bedtime, Disp: 30 tablet, Rfl: 0  •  sodium hypochlorite (DAKIN'S HALF-STRENGTH) external solution, Apply 1 application  topically daily At each dressing change, Disp: 473 mL, Rfl: 0  •  sotalol (BETAPACE) 80 mg tablet, Take 1 tablet (80 mg total) by mouth daily, Disp: 60 tablet, Rfl: 5  •  torsemide 40 MG TABS, Take 40 mg by mouth 2 (two) times a day, Disp: 60 tablet, Rfl: 0  •  warfarin (COUMADIN) 2 mg tablet, Take 2 tablets (4 mg total) by mouth daily Acknowledge (Patient taking differently: Take 4 mg by mouth daily Pt taking 6mg Tue/Thur/Sat and 4 mg M/W/F/Sun), Disp: 14 tablet, Rfl: 0  No current facility-administered medications for this visit      Facility-Administered Medications Ordered in Other Visits:   •  cefTRIAXone (ROCEPHIN) 2,000 mg in dextrose 5 % 50 mL IVPB, 2,000 mg, Intravenous, Once, Pam Tena MD  •  sodium chloride 0 9 % infusion, 75 mL/hr, Intravenous, Continuous, Pam Tena MD, Stopped at 05/26/23 6786    Social History     Socioeconomic History   • Marital status: /Civil Union     Spouse name: Not on file   • Number of children: Not on file   • Years of education: Not on file   • Highest education level: Not on file   Occupational History   • Not on file   Tobacco Use   • Smoking status: Never   • Smokeless tobacco: Never   Vaping Use   • Vaping Use: Never used   Substance and Sexual Activity   • Alcohol use: Not Currently     Comment: no alcohol in 28 yrs   • "Drug use: Never   • Sexual activity: Yes     Partners: Female     Birth control/protection: None   Other Topics Concern   • Not on file   Social History Narrative   • Not on file     Social Determinants of Health     Financial Resource Strain: Not on file   Food Insecurity: No Food Insecurity (5/25/2023)    Hunger Vital Sign    • Worried About Running Out of Food in the Last Year: Never true    • Ran Out of Food in the Last Year: Never true   Transportation Needs: No Transportation Needs (5/25/2023)    PRAPARE - Transportation    • Lack of Transportation (Medical): No    • Lack of Transportation (Non-Medical): No   Physical Activity: Not on file   Stress: Not on file   Social Connections: Not on file   Intimate Partner Violence: Not on file   Housing Stability: Low Risk  (5/25/2023)    Housing Stability Vital Sign    • Unable to Pay for Housing in the Last Year: No    • Number of Places Lived in the Last Year: 1    • Unstable Housing in the Last Year: No     Family History   Problem Relation Age of Onset   • Arthritis Mother    • Stroke Mother    • Arthritis Father    • Cancer Father    • Arthritis Daughter        Vitals:  Blood pressure 100/58, pulse 85, height 6' 1\" (1 854 m), weight 130 kg (285 lb 8 oz), SpO2 97 %  Body mass index is 37 67 kg/m²  Wt Readings from Last 10 Encounters:   06/02/23 130 kg (285 lb 8 oz)   05/29/23 132 kg (291 lb 14 2 oz)   05/19/23 (!) 147 kg (324 lb 1 2 oz)   04/27/23 (!) 147 kg (323 lb)   04/08/23 (!) 142 kg (312 lb)   04/07/23 (!) 147 kg (323 lb 1 6 oz)   03/08/23 (!) 137 kg (301 lb)   01/23/23 (!) 139 kg (307 lb)   12/17/22 133 kg (293 lb)   12/03/22 132 kg (292 lb)     Vitals:    06/02/23 1559   BP: 100/58   BP Location: Right arm   Patient Position: Sitting   Cuff Size: Standard   Pulse: 85   SpO2: 97%   Weight: 130 kg (285 lb 8 oz)   Height: 6' 1\" (1 854 m)       Physical Exam  Constitutional:       Appearance: Normal appearance  HENT:      Head: Normocephalic        Nose: " Nose normal       Mouth/Throat:      Mouth: Mucous membranes are moist    Eyes:      Conjunctiva/sclera: Conjunctivae normal    Neck:      Vascular: No JVD  Cardiovascular:      Rate and Rhythm: Normal rate and regular rhythm  Heart sounds: Murmur heard  Pulmonary:      Effort: Pulmonary effort is normal       Breath sounds: Normal breath sounds  Musculoskeletal:      Cervical back: Neck supple  Right lower le+ Pitting Edema present  Left lower le+ Pitting Edema present  Skin:     General: Skin is warm and dry  Neurological:      General: No focal deficit present  Mental Status: He is alert and oriented to person, place, and time  Psychiatric:         Mood and Affect: Mood normal          Behavior: Behavior normal          Labs & Results:  Lab Results   Component Value Date    HCT 26 9 (L) 2023    HGB 8 5 (L) 2023    MCV 93 2023     2023    WBC 5 59 2023     Lab Results   Component Value Date    BUN 32 (H) 2023    CALCIUM 9 1 2023     2023    CO2 32 2023    CREATININE 1 81 (H) 2023    GLUC 112 2023    K 3 8 2023    SODIUM 135 2023     Lab Results   Component Value Date    INR 2 20 (H) 2023    INR 2 14 (H) 2023    INR 2 40 (H) 2023    PROTIME 24 7 (H) 2023    PROTIME 24 1 (H) 2023    PROTIME 26 4 (H) 2023     Lab Results   Component Value Date     (H) 2023      Lab Results   Component Value Date    NTBNP 2,258 (H) 2022      Thank you for the opportunity to participate in the care of this patient      Ruthy Quinones PA-C

## 2023-06-02 NOTE — LETTER
June 2, 2023     Patient: Nikolai Moses  YOB: 1935  Date of Visit: 6/2/2023      To Whom it May Concern:    Gina Rivero is under my professional care  Coleen Gonzalez was seen in my office on 6/2/2023  Coleen Gonzalez may return to work as a   If you have any questions or concerns, please don't hesitate to call           Sincerely,          Titi Perez PA-C        CC: No Recipients

## 2023-06-02 NOTE — PATIENT INSTRUCTIONS
Continue your current medications  Follow up in 2 weeks  Please weigh yourself every day (after emptying your bladder) and keep a detailed log of weights  Contact the Heart Failure program at [phone] if you gain 3 lbs overnight or 5 lbs in 5-7 days  Limit daily sodium/salt intake to 2000 mg daily to prevent fluid retention  Avoid canned foods, fast food/Chinese food, and processed meats (hot dogs, lunch meat, and sausage etc )  Caution with condiments  Limit fluid intake to 2000 mL or 2 liters (about 60-65 ounces) daily  Avoid electrolyte replacement drinks (such as Gatorade, Pedialyte, Propel, Liquid IV, etc )  Bring complete list of medications and log of daily weights to your follow-up appointment

## 2023-06-04 PROBLEM — J18.9 PNEUMONIA OF BOTH LUNGS DUE TO INFECTIOUS ORGANISM: Status: RESOLVED | Noted: 2023-04-05 | Resolved: 2023-06-04

## 2023-06-05 ENCOUNTER — LAB (OUTPATIENT)
Dept: LAB | Age: 88
End: 2023-06-05
Payer: MEDICARE

## 2023-06-05 ENCOUNTER — ANTICOAG VISIT (OUTPATIENT)
Dept: CARDIOLOGY CLINIC | Facility: CLINIC | Age: 88
End: 2023-06-05

## 2023-06-05 ENCOUNTER — HOME CARE VISIT (OUTPATIENT)
Dept: HOME HEALTH SERVICES | Facility: HOME HEALTHCARE | Age: 88
End: 2023-06-05
Payer: MEDICARE

## 2023-06-05 VITALS
OXYGEN SATURATION: 98 % | HEART RATE: 87 BPM | TEMPERATURE: 97.5 F | DIASTOLIC BLOOD PRESSURE: 70 MMHG | RESPIRATION RATE: 18 BRPM | SYSTOLIC BLOOD PRESSURE: 110 MMHG

## 2023-06-05 DIAGNOSIS — I26.93 SINGLE SUBSEGMENTAL PULMONARY EMBOLISM WITHOUT ACUTE COR PULMONALE (HCC): ICD-10-CM

## 2023-06-05 DIAGNOSIS — I50.32 CHRONIC HEART FAILURE WITH PRESERVED EJECTION FRACTION (HFPEF) (HCC): ICD-10-CM

## 2023-06-05 DIAGNOSIS — I50.32 CHRONIC DIASTOLIC CHF (CONGESTIVE HEART FAILURE) (HCC): Chronic | ICD-10-CM

## 2023-06-05 DIAGNOSIS — I48.0 PAROXYSMAL ATRIAL FIBRILLATION (HCC): Primary | Chronic | ICD-10-CM

## 2023-06-05 DIAGNOSIS — N18.30 STAGE 3 CHRONIC KIDNEY DISEASE, UNSPECIFIED WHETHER STAGE 3A OR 3B CKD (HCC): Chronic | ICD-10-CM

## 2023-06-05 LAB
ANION GAP SERPL CALCULATED.3IONS-SCNC: 5 MMOL/L (ref 4–13)
BUN SERPL-MCNC: 30 MG/DL (ref 5–25)
CALCIUM SERPL-MCNC: 9 MG/DL (ref 8.3–10.1)
CHLORIDE SERPL-SCNC: 102 MMOL/L (ref 96–108)
CO2 SERPL-SCNC: 28 MMOL/L (ref 21–32)
CREAT SERPL-MCNC: 1.64 MG/DL (ref 0.6–1.3)
GFR SERPL CREATININE-BSD FRML MDRD: 37 ML/MIN/1.73SQ M
GLUCOSE P FAST SERPL-MCNC: 104 MG/DL (ref 65–99)
POTASSIUM SERPL-SCNC: 3.8 MMOL/L (ref 3.5–5.3)
SODIUM SERPL-SCNC: 135 MMOL/L (ref 135–147)

## 2023-06-05 PROCEDURE — 80048 BASIC METABOLIC PNL TOTAL CA: CPT

## 2023-06-05 PROCEDURE — G0299 HHS/HOSPICE OF RN EA 15 MIN: HCPCS

## 2023-06-05 PROCEDURE — 36415 COLL VENOUS BLD VENIPUNCTURE: CPT

## 2023-06-06 ENCOUNTER — HOME CARE VISIT (OUTPATIENT)
Dept: HOME HEALTH SERVICES | Facility: HOME HEALTHCARE | Age: 88
End: 2023-06-06
Payer: MEDICARE

## 2023-06-06 ENCOUNTER — OFFICE VISIT (OUTPATIENT)
Dept: WOUND CARE | Facility: HOSPITAL | Age: 88
End: 2023-06-06
Payer: MEDICARE

## 2023-06-06 VITALS
TEMPERATURE: 97.6 F | DIASTOLIC BLOOD PRESSURE: 57 MMHG | HEART RATE: 74 BPM | SYSTOLIC BLOOD PRESSURE: 108 MMHG | RESPIRATION RATE: 16 BRPM

## 2023-06-06 DIAGNOSIS — I87.311 CHRONIC VENOUS HYPERTENSION (IDIOPATHIC) WITH ULCER OF RIGHT LOWER EXTREMITY (HCC): Primary | ICD-10-CM

## 2023-06-06 DIAGNOSIS — L97.919 CHRONIC VENOUS HYPERTENSION (IDIOPATHIC) WITH ULCER OF RIGHT LOWER EXTREMITY (HCC): Primary | ICD-10-CM

## 2023-06-06 DIAGNOSIS — I48.0 PAROXYSMAL ATRIAL FIBRILLATION (HCC): Primary | Chronic | ICD-10-CM

## 2023-06-06 DIAGNOSIS — L97.312 NON-PRESSURE CHRONIC ULCER OF RIGHT ANKLE WITH FAT LAYER EXPOSED (HCC): ICD-10-CM

## 2023-06-06 LAB
INR PPP: 2.2
PROTHROMBIN TIME: 22.4 SEC (ref 9–11.5)

## 2023-06-06 PROCEDURE — 99214 OFFICE O/P EST MOD 30 MIN: CPT | Performed by: FAMILY MEDICINE

## 2023-06-06 PROCEDURE — 11042 DBRDMT SUBQ TIS 1ST 20SQCM/<: CPT | Performed by: FAMILY MEDICINE

## 2023-06-06 PROCEDURE — 11045 DBRDMT SUBQ TISS EACH ADDL: CPT | Performed by: FAMILY MEDICINE

## 2023-06-06 RX ORDER — LIDOCAINE 40 MG/G
CREAM TOPICAL ONCE
Status: COMPLETED | OUTPATIENT
Start: 2023-06-06 | End: 2023-06-06

## 2023-06-06 RX ADMIN — LIDOCAINE: 40 CREAM TOPICAL at 13:45

## 2023-06-06 NOTE — PATIENT INSTRUCTIONS
Orders Placed This Encounter   Procedures    Wound cleansing and dressings     Wound locations:  Right right ankle  Change dressing:  3x per week and as needed for strike through drainage  You may remove the dressing and shower  Do not leave wound open to air, apply new dressing immediately  Cleanse the wound with mild soap and water, pat dry  Apply Dakins moist gauze to wound and leave intact 10 minutes then may remove  Cover with Camille Bottoms AG cut to fit wound bed  Cover with Kelli Deep  Secure with roll gauze then tape  Sacrum and right posterior thigh:    Cleanse with soap and water, pat dry  Apply Triad paste daily to areas and as needed for soilage, dislodgement  This was applied [facility] at the [facility]  Left posterior thigh wound is healed! Cleanse with mild soap and water and dry  Apply moisturizer to area daily       Standing Status:   Future     Standing Expiration Date:   6/6/2024

## 2023-06-06 NOTE — CASE COMMUNICATION
Ship to XX Pt  Home   Insurance [facility]     Wound 1 Right ANkle      Full XX         All items are ordered by the each unless otherwise noted    PLEASE DO NOT ORDER BY THE BOX  Request specific quantity needed  For Private Insurances order should be for a 2 week period    Emely STALEY AB        2  Mextra                8

## 2023-06-06 NOTE — PROGRESS NOTES
"Patient ID: Didier Grandchild is a 81 y o  male Date of Birth 1935     Chief Complaint  Chief Complaint   Patient presents with   • Follow Up Wound Care Visit     Right ankle, sacral and right thigh wounds  Allergies  Tramadol    Assessment:    No problem-specific Assessment & Plan notes found for this encounter  Diagnoses and all orders for this visit:    Chronic venous hypertension (idiopathic) with ulcer of right lower extremity (HCC)  -     Debridement    Non-pressure chronic ulcer of right ankle with fat layer exposed ([address] )  -     lidocaine (LMX) 4 % cream  -     Wound cleansing and dressings; Future              Debridement   Wound 01/13/23 Ankle Right;Medial    Universal Protocol:  Consent: Verbal consent obtained  Consent given by: patient  Time out: Immediately prior to procedure a \"time out\" was called to verify the correct patient, procedure, equipment, support staff and site/side marked as required  Timeout called at: 6/6/2023 1:33 PM   Patient understanding: patient states understanding of the procedure being performed  Patient identity confirmed: verbally with patient      Performed by: physician  Debridement type: surgical  Level of debridement: subcutaneous tissue  Pain control: lidocaine 4%  Post-debridement measurements  Length (cm): 12 5  Width (cm): 7  Depth (cm): 0 2  Percent debrided: 80%  Surface Area (cm^2): 87 5  Area debrided (cm^2): 70  Volume (cm^3): 17 5  Tissue and other material debrided: subcutaneous tissue  Devitalized tissue debrided: exudate and slough  Instrument(s) utilized: curette  Bleeding: small  Hemostasis obtained with: pressure  Response to treatment: procedure was tolerated well          Plan:  New wounds on the right buttock and sacral regions    Original wound on the right ankle region is essentially unchanged  Ankle wound debrided as above  Wound management with Exufiber Ag and Mextra, see wound orders below  Dakin soak with each dressing " change  Compression with Ace wrap  Keep legs elevated whenever seated and avoid prolonged standing  Pressure-relief to the buttock and sacral regions  Adequate protein intake  Follow-up in 1 week or call sooner with questions or concerns    Wound 01/13/23 Ankle Right;Medial (Active)   Wound Image Images linked 06/06/23 1340   Wound Description Beefy red;Pink;Yellow;Epithelialization 06/06/23 1326   Deedee-wound Assessment Edema 06/06/23 1326   Wound Length (cm) 12 5 cm 06/06/23 1326   Wound Width (cm) 7 cm 06/06/23 1326   Wound Depth (cm) 0 1 cm 06/06/23 1326   Wound Surface Area (cm^2) 87 5 cm^2 06/06/23 1326   Wound Volume (cm^3) 8 75 cm^3 06/06/23 1326   Calculated Wound Volume (cm^3) 8 75 cm^3 06/06/23 1326   Change in Wound Size % -301 38 06/06/23 1326   Drainage Amount Copious 06/06/23 1326   Drainage Description Foul smelling;Yellow;Green 06/06/23 1326   Non-staged Wound Description Full thickness 06/06/23 1326   Dressing Status Intact 06/06/23 1326       Wound 05/24/23 Sacrum (Active)   Wound Image Images linked 06/06/23 1328   Wound Description Slough; Yellow 06/06/23 1331   Deedee-wound Assessment Clean;Dry; Intact 06/06/23 1331   Wound Length (cm) 0 1 cm 06/06/23 1331   Wound Width (cm) 1 cm 06/06/23 1331   Wound Depth (cm) 0 1 cm 06/06/23 1331   Wound Surface Area (cm^2) 0 1 cm^2 06/06/23 1331   Wound Volume (cm^3) 0 01 cm^3 06/06/23 1331   Calculated Wound Volume (cm^3) 0 01 cm^3 06/06/23 1331   Change in Wound Size % 99 96 06/06/23 1331   Drainage Amount None 06/06/23 1331   Non-staged Wound Description Full thickness 06/06/23 1331   Dressing Status Other (Comment) (open to air) 06/06/23 1331       Wound 05/24/23 Other (comment) Thigh Left;Posterior;Proximal (Active)   Wound Image Images linked 06/06/23 1327   Wound Description Epithelialization 06/06/23 1332   Deedee-wound Assessment Clean;Dry; Intact 06/06/23 1332   Wound Length (cm) 0 cm 06/06/23 1332   Wound Width (cm) 0 cm 06/06/23 1332   Wound Depth (cm) 0 cm 06/06/23 1332   Wound Surface Area (cm^2) 0 cm^2 06/06/23 1332   Wound Volume (cm^3) 0 cm^3 06/06/23 1332   Calculated Wound Volume (cm^3) 0 cm^3 06/06/23 1332   Change in Wound Size % 100 06/06/23 1332   Drainage Amount None 06/06/23 1332   Non-staged Wound Description Not applicable 22/93/95 8376       Wound 05/24/23 Other (comment) Thigh Anterior;Proximal;Right (Active)   Wound Image Images linked 06/06/23 1328   Wound Description Epithelialization; Beefy red 06/06/23 1330   Deedee-wound Assessment Clean;Dry; Intact 06/06/23 1330   Wound Length (cm) 0 5 cm 06/06/23 1330   Wound Width (cm) 0 7 cm 06/06/23 1330   Wound Depth (cm) 0 cm 06/06/23 1330   Wound Surface Area (cm^2) 0 35 cm^2 06/06/23 1330   Wound Volume (cm^3) 0 cm^3 06/06/23 1330   Calculated Wound Volume (cm^3) 0 cm^3 06/06/23 1330   Change in Wound Size % 100 06/06/23 1330   Drainage Amount None 06/06/23 1330   Non-staged Wound Description Full thickness 06/06/23 1330       Wound 01/13/23 Ankle Right;Medial (Active)   Date First Assessed/Time First Assessed: 01/13/23 1308   Location: Ankle  Wound Location Orientation: Right;Medial       Wound 05/24/23 Sacrum (Active)   Date First Assessed/Time First Assessed: 05/24/23 2214   Location: Sacrum       Wound 05/24/23 Other (comment) Thigh Left;Posterior;Proximal (Active)   Date First Assessed/Time First Assessed: 05/24/23 2200   Present on Original Admission: Yes  Primary Wound Type: Other (comment)  Location: Thigh  Wound Location Orientation: Left;Posterior;Proximal  Dressing Status: Open to air  Wound Outcome: Healed       Wound 05/24/23 Other (comment) Thigh Anterior;Proximal;Right (Active)   Date First Assessed/Time First Assessed: 05/24/23 2200   Present on Original Admission: Yes  Primary Wound Type:  Other (comment)  Location: Thigh  Wound Location Orientation: Anterior;Proximal;Right  Dressing Status: Open to air       [REMOVED] Incision 08/17/18 Leg Right (Removed)   Resolved Date: (c) 09/05/22 Date First Assessed/Time First Assessed: 08/17/18 1127   Location: Leg  Wound Location Orientation: Right       [REMOVED] Wound 09/05/22 Surgical Abdomen Right (Removed)   Resolved Date: 10/25/22  Date First Assessed/Time First Assessed: 09/05/22 1505   Pre-Existing Wound: Yes  Primary Wound Type: Surgical  Location: Abdomen  Wound Location Orientation: Right  Wound Description (Comments): IR Drain tube       [REMOVED] Wound 10/31/22 Surgical Abdomen Right;Quadrant; Upper (Removed)   Resolved Date: 11/30/22  Date First Assessed/Time First Assessed: 10/31/22 1422   Pre-Existing Wound: Yes  Primary Wound Type: Surgical  Location: Abdomen  Wound Location Orientation: Right;Quadrant; Upper  Wound Description (Comments): IR Gail tube p  [REMOVED] Wound 11/09/22 Venous Ulcer Ankle Right;Medial (Removed)   Resolved Date: 11/30/22  Date First Assessed/Time First Assessed: 11/09/22 2200   Pre-Existing Wound: No  Primary Wound Type: Venous Ulcer  Location: Ankle  Wound Location Orientation: Right;Medial  Dressing Status: Clean;Dry; Intact  Wound Outcome: He    [REMOVED] Wound 11/11/22 Pressure Injury Buttocks Right; Inner (Removed)   Resolved Date: 11/30/22  Date First Assessed/Time First Assessed: 11/11/22 1200   Pre-Existing Wound: No  Primary Wound Type: Pressure Injury  Location: Buttocks  Wound Location Orientation: Right; Inner  Wound Description (Comments): stage 2 pressure     [REMOVED] Wound 12/16/22 Other (comment) Leg Inner;Right (Removed)   Resolved Date: 03/03/23  Date First Assessed/Time First Assessed: 12/16/22 0909   Pre-Existing Wound: No  Primary Wound Type: Other (comment)  Location: Leg  Wound Location Orientation: Inner;Right  Dressing Status: Clean;Dry; Intact  Wound Outcome: Ot    [REMOVED] Wound 02/17/23 Venous Ulcer Leg Right; Lower;Mid;Anterior (Removed)   Resolved Date: 03/24/23  Date First Assessed/Time First Assessed: 02/17/23 1433   Primary Wound Type: Venous Ulcer  Location: Leg  Wound Location Orientation: Right; Lower;Mid;Anterior  Wound Outcome: Healed       [REMOVED] Wound 03/03/23 Leg Right;Medial (Removed)   Resolved Date: 05/25/23  Date First Assessed/Time First Assessed: 03/03/23 1329   Location: Leg  Wound Location Orientation: Right;Medial  Wound Outcome: Healed       [REMOVED] Wound 05/24/23 Anterior;Right (Removed)   Resolved Date: 05/25/23  Date First Assessed/Time First Assessed: 05/24/23 2216   Wound Location Orientation: Anterior;Right       Subjective:        Patient presents for follow-up of a venous ulcer of the right ankle region  Patient was hospitalized since last visit for fatigue and shortness of breath, noted to have acute on chronic CHF exacerbation  During the hospital stay patient was also noted to have multiple pressure ulcers of the buttock and sacral regions  Triad paste was used while he was hospitalized  The following portions of the patient's history were reviewed and updated as appropriate:   He  has a past medical history of Anemia, Arthritis, Atrial fibrillation ([facility] [address] ), Basal cell carcinoma (03/22/2022), CHF (congestive heart failure) ([facility] [address] ), Diabetes mellitus ([facility] [address] ), DVT (deep vein thrombosis) in pregnancy (1966), DVT (deep venous thrombosis) ([facility] [address] ) (1966), Dyslipidemia, Encephalopathy acute (11/4/2022), GERD (gastroesophageal reflux disease), Hyperlipidemia, Hypertension, Hypomagnesemia (10/19/2022), Irregular heart beat, Morbid obesity due to excess calories ([facility] [address] ), Pulmonary embolism ([facility] [address] ), Sepsis ([facility] [address] ), Squamous cell skin cancer (07/30/2020), and Visual impairment    He   Patient Active Problem List    Diagnosis Date Noted   • Pulmonary embolism [facility])    • CHF (congestive heart failure) ([facility] [address] )    • Melena 05/24/2023   • 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   • COVID-19 04/05/2023   • Pleural effusion on right 04/05/2023   • Calculus of gallbladder 03/08/2023   • Cholecystostomy care ([facility] [address] ) 11/21/2022   • Gout 11/21/2022   • Acute on chronic cholecystitis 11/04/2022   • Acute cholecystitis 08/30/2022   • Benign prostatic hyperplasia with lower urinary tract symptoms 08/09/2022   • Morbid obesity    • CKD (chronic kidney disease) stage 3, GFR 30-59 ml/min ([address] ) 05/07/2022   • Acute kidney injury superimposed on stage 3 chronic kidney disease  11/18/2021   • Osteoarthritis, knee 11/15/2021   • Status post right knee replacement 11/15/2021   • Hyperlipidemia 03/29/2021   • Swelling of limb 02/04/2021   • History of venous thromboembolism 09/10/2020   • Secondary lymphedema 09/24/2018   • Venous ulcer of right ankle 07/11/2018   • Acute on chronic diastolic CHF 40/75/2343   • History of AVR 03/22/2018   • Thrombophlebitis 03/22/2018   • History of ventricular tachycardia 03/22/2018   • Chronic bilateral low back pain without sciatica 04/24/2017   • BPH (benign prostatic hyperplasia) 04/20/2017   • Obesity, unspecified obesity severity, unspecified obesity type 04/04/2017   • Hydrocele 04/04/2017   • Chronic anticoagulation 04/04/2017   • Acute on chronic anemia 04/04/2017   • Renal cyst 04/04/2017   • 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 08/11/2015   • Ventricular tachycardia 04/15/2014   • Paroxysmal atrial fibrillation  03/19/2014   • Edema 03/19/2014   • Endocarditis 03/19/2014   • Type 2 diabetes mellitus with diabetic neuropathy 08/19/2013   • Peripheral venous insufficiency 08/19/2013   • Cardiomegaly 08/19/2013   • PVD (peripheral vascular disease) ([address] ) 04/19/2013   • Dyslipidemia 04/19/2013     He  reports that he has never smoked  He has never used smokeless tobacco  He reports that he does not currently use alcohol  He reports that he does not use drugs  Current Outpatient Medications   Medication Sig Dispense Refill   • Acetaminophen (TYLENOL EXTRA STRENGTH PO) Take 500 mg by mouth if needed (for pain as needed)   Indications: pain as needed     • atorvastatin (LIPITOR) 40 mg tablet Take 40 mg by mouth daily after dinner Atorvastatin Calcium 40 MG Oral Tablet Take 1 tablet daily  Refills: 0  Active      • B Complex-C (SUPER B COMPLEX PO) Take 1 capsule by mouth daily      • Cholecalciferol 25 MCG (1000 UT) capsule Take 3,000 Units by mouth daily  Indications: Vitamin D Deficiency     • Diclofenac Sodium (VOLTAREN) 1 % Apply 2 g topically 4 (four) times a day     • fluticasone (FLONASE) 50 mcg/act nasal spray 2 sprays into each nostril daily as needed Shake liquid and spray     • gabapentin (NEURONTIN) 100 mg capsule Take 100 mg by mouth daily     • Iron Combinations (NIFEREX) TABS Take 1 tablet by mouth in the morning  5   • lubiprostone (AMITIZA) 24 mcg capsule Take 24 mcg by mouth 2 (two) times a day with meals  Indications: Chronic Constipation of Unknown Cause     • metFORMIN (GLUCOPHAGE) 1000 MG tablet Take 1,000 mg by mouth daily with dinner  Indications: Type 2 Diabetes     • Multiple Vitamin (MULTIVITAMINS PO) Take 1 tablet by mouth daily     • omeprazole (PriLOSEC) 20 mg delayed release capsule Omeprazole 20 MG Oral Tablet Delayed Release Take 1 tablet daily  Refills: 0  Active     • polyethylene glycol (GLYCOLAX) 17 GM/SCOOP powder Take 17 g by mouth 2 (two) times a day     • Potassium Chloride ER 20 MEQ TBCR Take 20 mEq by mouth in the morning  Indications: Low Amount of Potassium in the Blood     • senna-docusate sodium (SENOKOT S) 8 6-50 mg per tablet Take 1 tablet by mouth daily at bedtime 30 tablet 0   • sodium hypochlorite (DAKIN'S HALF-STRENGTH) external solution Apply 1 application   topically daily At each dressing change 473 mL 0   • sotalol (BETAPACE) 80 mg tablet Take 1 tablet (80 mg total) by mouth daily 60 tablet 5   • torsemide 40 MG TABS Take 40 mg by mouth 2 (two) times a day 60 tablet 0   • warfarin (COUMADIN) 2 mg tablet Take 2 tablets (4 mg total) by mouth daily Acknowledge (Patient taking differently: Take 4 mg by mouth daily Pt taking 6mg Tue/Thur/Sat and 4 mg M/W/F/Sun) 14 tablet 0     No current facility-administered medications for this visit  Facility-Administered Medications Ordered in Other Visits   Medication Dose Route Frequency Provider Last Rate Last Admin   • cefTRIAXone (ROCEPHIN) 2,000 mg in dextrose 5 % 50 mL IVPB  2,000 mg Intravenous Once Brianda Mosher MD       • sodium chloride 0 9 % infusion  75 mL/hr Intravenous Continuous Brianda Mosher MD   Stopped at 05/26/23 1426     He is allergic to tramadol       Review of Systems   Constitutional: Negative for chills and fever  HENT: Negative for congestion and sneezing  Respiratory: Negative for cough  Cardiovascular: Positive for leg swelling  Musculoskeletal: Positive for gait problem  Skin: Positive for wound  Psychiatric/Behavioral: Negative for agitation  Objective:       Wound 01/13/23 Ankle Right;Medial (Active)   Wound Image Images linked 06/06/23 1340   Wound Description Beefy red;Pink;Yellow;Epithelialization 06/06/23 1326   Deedee-wound Assessment Edema 06/06/23 1326   Wound Length (cm) 12 5 cm 06/06/23 1326   Wound Width (cm) 7 cm 06/06/23 1326   Wound Depth (cm) 0 1 cm 06/06/23 1326   Wound Surface Area (cm^2) 87 5 cm^2 06/06/23 1326   Wound Volume (cm^3) 8 75 cm^3 06/06/23 1326   Calculated Wound Volume (cm^3) 8 75 cm^3 06/06/23 1326   Change in Wound Size % -301 38 06/06/23 1326   Drainage Amount Copious 06/06/23 1326   Drainage Description Foul smelling;Yellow;Green 06/06/23 1326   Non-staged Wound Description Full thickness 06/06/23 1326   Dressing Status Intact 06/06/23 1326       Wound 05/24/23 Sacrum (Active)   Wound Image Images linked 06/06/23 1328   Wound Description Slough; Yellow 06/06/23 1331   Deedee-wound Assessment Clean;Dry; Intact 06/06/23 1331   Wound Length (cm) 0 1 cm 06/06/23 1331   Wound Width (cm) 1 cm 06/06/23 1331   Wound Depth (cm) 0 1 cm 06/06/23 1331   Wound Surface Area (cm^2) 0 1 cm^2 06/06/23 1331   Wound Volume (cm^3) 0 01 cm^3 06/06/23 1331   Calculated Wound Volume (cm^3) 0 01 cm^3 06/06/23 1331   Change in Wound Size % 99 96 06/06/23 1331   Drainage Amount None 06/06/23 1331   Non-staged Wound Description Full thickness 06/06/23 1331   Dressing Status Other (Comment) (open to air) 06/06/23 1331       Wound 05/24/23 Other (comment) Thigh Left;Posterior;Proximal (Active)   Wound Image Images linked 06/06/23 1327   Wound Description Epithelialization 06/06/23 1332   Deedee-wound Assessment Clean;Dry; Intact 06/06/23 1332   Wound Length (cm) 0 cm 06/06/23 1332   Wound Width (cm) 0 cm 06/06/23 1332   Wound Depth (cm) 0 cm 06/06/23 1332   Wound Surface Area (cm^2) 0 cm^2 06/06/23 1332   Wound Volume (cm^3) 0 cm^3 06/06/23 1332   Calculated Wound Volume (cm^3) 0 cm^3 06/06/23 1332   Change in Wound Size % 100 06/06/23 1332   Drainage Amount None 06/06/23 1332   Non-staged Wound Description Not applicable 11/14/66 1267       Wound 05/24/23 Other (comment) Thigh Anterior;Proximal;Right (Active)   Wound Image Images linked 06/06/23 1328   Wound Description Epithelialization; Beefy red 06/06/23 1330   Deedee-wound Assessment Clean;Dry; Intact 06/06/23 1330   Wound Length (cm) 0 5 cm 06/06/23 1330   Wound Width (cm) 0 7 cm 06/06/23 1330   Wound Depth (cm) 0 cm 06/06/23 1330   Wound Surface Area (cm^2) 0 35 cm^2 06/06/23 1330   Wound Volume (cm^3) 0 cm^3 06/06/23 1330   Calculated Wound Volume (cm^3) 0 cm^3 06/06/23 1330   Change in Wound Size % 100 06/06/23 1330   Drainage Amount None 06/06/23 1330   Non-staged Wound Description Full thickness 06/06/23 1330       /57   Pulse 74   Temp 97 6 °F (36 4 °C)   Resp 16     Physical Exam  Vitals reviewed  Constitutional:       General: He is not in acute distress  Appearance: Normal appearance  He is obese  He is not ill-appearing, toxic-appearing or diaphoretic  HENT:      Head: Normocephalic and atraumatic        Right Ear: External ear normal       Left Ear: External ear normal    Eyes:      Conjunctiva/sclera: Conjunctivae normal    Pulmonary: Effort: Pulmonary effort is normal  No respiratory distress  Musculoskeletal:      Cervical back: Neck supple  Right lower leg: Edema present  Left lower leg: Edema present  Skin:     Comments: See wound assessment   Neurological:      Mental Status: He is alert  Psychiatric:         Mood and Affect: Mood normal          Behavior: Behavior normal            Wound 01/13/23 Ankle Right;Medial (Active)   Wound Image   06/06/23 1340   Wound Description Beefy red;Pink;Yellow;Epithelialization 06/06/23 1326   Deedee-wound Assessment Edema 06/06/23 1326   Wound Length (cm) 12 5 cm 06/06/23 1326   Wound Width (cm) 7 cm 06/06/23 1326   Wound Depth (cm) 0 1 cm 06/06/23 1326   Wound Surface Area (cm^2) 87 5 cm^2 06/06/23 1326   Wound Volume (cm^3) 8 75 cm^3 06/06/23 1326   Calculated Wound Volume (cm^3) 8 75 cm^3 06/06/23 1326   Change in Wound Size % -301 38 06/06/23 1326   Drainage Amount Copious 06/06/23 1326   Drainage Description Foul smelling;Yellow;Green 06/06/23 1326   Non-staged Wound Description Full thickness 06/06/23 1326   Patient Tolerance Tolerated well 05/23/23 1444   Dressing Status Intact 06/06/23 1326       Wound 05/24/23 Sacrum (Active)   Wound Image   06/06/23 1328   Wound Description Slough; Yellow 06/06/23 1331   Deedee-wound Assessment Clean;Dry; Intact 06/06/23 1331   Wound Length (cm) 0 1 cm 06/06/23 1331   Wound Width (cm) 1 cm 06/06/23 1331   Wound Depth (cm) 0 1 cm 06/06/23 1331   Wound Surface Area (cm^2) 0 1 cm^2 06/06/23 1331   Wound Volume (cm^3) 0 01 cm^3 06/06/23 1331   Calculated Wound Volume (cm^3) 0 01 cm^3 06/06/23 1331   Change in Wound Size % 99 96 06/06/23 1331   Drainage Amount None 06/06/23 1331   Non-staged Wound Description Full thickness 06/06/23 1331   Dressing Status Other (Comment) 06/06/23 1331       Wound 05/24/23 Other (comment) Thigh Left;Posterior;Proximal (Active)   Wound Image   06/06/23 1327   Wound Description Epithelialization 06/06/23 1332   Deedee-wound Assessment Clean;Dry; Intact 06/06/23 1332   Wound Length (cm) 0 cm 06/06/23 1332   Wound Width (cm) 0 cm 06/06/23 1332   Wound Depth (cm) 0 cm 06/06/23 1332   Wound Surface Area (cm^2) 0 cm^2 06/06/23 1332   Wound Volume (cm^3) 0 cm^3 06/06/23 1332   Calculated Wound Volume (cm^3) 0 cm^3 06/06/23 1332   Change in Wound Size % 100 06/06/23 1332   Drainage Amount None 06/06/23 1332   Non-staged Wound Description Not applicable 54/85/30 0915       Wound 05/24/23 Other (comment) Thigh Anterior;Proximal;Right (Active)   Wound Image   06/06/23 1328   Wound Description Epithelialization; Beefy red 06/06/23 1330   Deedee-wound Assessment Clean;Dry; Intact 06/06/23 1330   Wound Length (cm) 0 5 cm 06/06/23 1330   Wound Width (cm) 0 7 cm 06/06/23 1330   Wound Depth (cm) 0 cm 06/06/23 1330   Wound Surface Area (cm^2) 0 35 cm^2 06/06/23 1330   Wound Volume (cm^3) 0 cm^3 06/06/23 1330   Calculated Wound Volume (cm^3) 0 cm^3 06/06/23 1330   Change in Wound Size % 100 06/06/23 1330   Drainage Amount None 06/06/23 1330   Non-staged Wound Description Full thickness 06/06/23 1330                         Wound Instructions:  Orders Placed This Encounter   Procedures   • Wound cleansing and dressings     Wound locations:  Right right ankle  Change dressing:  3x per week and as needed for strike through drainage  You may remove the dressing and shower  Do not leave wound open to air, apply new dressing immediately  Cleanse the wound with mild soap and water, pat dry  Apply Dakins moist gauze to wound and leave intact 10 minutes then may remove  Cover with [employer] cut to fit wound bed  Cover with [employer]  Secure with roll gauze then tape  Sacrum and right posterior thigh:    Cleanse with soap and water, pat dry  Apply Triad paste daily to areas and as needed for soilage, dislodgement  [facility]  Left posterior thigh wound is healed! Cleanse with mild soap and water and dry      Apply moisturizer to area daily  Standing Status:   Future     Standing Expiration Date:   6/6/2024   • Debridement     This order was created via procedure documentation        Diagnosis ICD-10-CM Associated Orders   1  Chronic venous hypertension (idiopathic) with ulcer of right lower extremity ([facility])  I87 [id number] Debridement    [id number]       2   Non-pressure chronic ulcer of right ankle with fat layer exposed ([facility] [address] )  L97 [id number] lidocaine (LMX) 4 % cream     Wound cleansing and dressings

## 2023-06-07 ENCOUNTER — HOME CARE VISIT (OUTPATIENT)
Dept: HOME HEALTH SERVICES | Facility: HOME HEALTHCARE | Age: 88
End: 2023-06-07
Payer: MEDICARE

## 2023-06-07 ENCOUNTER — APPOINTMENT (EMERGENCY)
Dept: RADIOLOGY | Facility: HOSPITAL | Age: 88
End: 2023-06-07
Payer: MEDICARE

## 2023-06-07 ENCOUNTER — ANTICOAG VISIT (OUTPATIENT)
Dept: CARDIOLOGY CLINIC | Facility: CLINIC | Age: 88
End: 2023-06-07

## 2023-06-07 ENCOUNTER — HOSPITAL ENCOUNTER (EMERGENCY)
Facility: HOSPITAL | Age: 88
Discharge: HOME | End: 2023-06-07
Attending: STUDENT IN AN ORGANIZED HEALTH CARE EDUCATION/TRAINING PROGRAM
Payer: MEDICARE

## 2023-06-07 VITALS
TEMPERATURE: 97 F | RESPIRATION RATE: 16 BRPM | HEART RATE: 75 BPM | WEIGHT: 299.83 LBS | DIASTOLIC BLOOD PRESSURE: 59 MMHG | SYSTOLIC BLOOD PRESSURE: 114 MMHG | OXYGEN SATURATION: 96 %
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
## 2023-06-07 DIAGNOSIS — S09.90XA CLOSED HEAD INJURY, INITIAL ENCOUNTER: ICD-10-CM

## 2023-06-07 DIAGNOSIS — I48.0 PAROXYSMAL ATRIAL FIBRILLATION (HCC): Primary | Chronic | ICD-10-CM

## 2023-06-07 DIAGNOSIS — I26.93 SINGLE SUBSEGMENTAL PULMONARY EMBOLISM WITHOUT ACUTE COR PULMONALE (HCC): ICD-10-CM

## 2023-06-07 DIAGNOSIS — S32.040A COMPRESSION FRACTURE OF L4 VERTEBRA, INITIAL ENCOUNTER (HCC): ICD-10-CM

## 2023-06-07 LAB
BASE EXCESS BLDA CALC-SCNC: 3 MMOL/L (ref -2–3)
CA-I BLD-SCNC: 1.14 MMOL/L (ref 1.12–1.32)
GLUCOSE SERPL-MCNC: 116 MG/DL (ref 65–140)
HCO3 BLDA-SCNC: 27.8 MMOL/L (ref 24–30)
HCT VFR BLD CALC: 27 % (ref 36.5–49.3)
HGB BLDA-MCNC: 9.2 G/DL (ref 12–17)
HOLD SPECIMEN: NORMAL
INR PPP: 2.83 (ref 0.84–1.19)
PCO2 BLD: 29 MMOL/L (ref 21–32)
PCO2 BLD: 42.8 MM HG (ref 42–50)
PH BLD: 7.42 [PH] (ref 7.3–7.4)
PO2 BLD: 19 MM HG (ref 35–45)
POTASSIUM BLD-SCNC: 3.7 MMOL/L (ref 3.5–5.3)
PROTHROMBIN TIME: 30 SECONDS (ref 11.6–14.5)
SAO2 % BLD FROM PO2: 29 % (ref 60–85)
SODIUM BLD-SCNC: 137 MMOL/L (ref 136–145)
SPECIMEN SOURCE: ABNORMAL

## 2023-06-07 PROCEDURE — 85610 PROTHROMBIN TIME: CPT | Performed by: STUDENT IN AN ORGANIZED HEALTH CARE EDUCATION/TRAINING PROGRAM

## 2023-06-07 PROCEDURE — 82947 ASSAY GLUCOSE BLOOD QUANT: CPT

## 2023-06-07 PROCEDURE — 85014 HEMATOCRIT: CPT

## 2023-06-07 PROCEDURE — 82330 ASSAY OF CALCIUM: CPT

## 2023-06-07 PROCEDURE — 82803 BLOOD GASES ANY COMBINATION: CPT

## 2023-06-07 PROCEDURE — 36415 COLL VENOUS BLD VENIPUNCTURE: CPT | Performed by: STUDENT IN AN ORGANIZED HEALTH CARE EDUCATION/TRAINING PROGRAM

## 2023-06-07 PROCEDURE — 72125 CT NECK SPINE W/O DYE: CPT

## 2023-06-07 PROCEDURE — 74177 CT ABD & PELVIS W/CONTRAST: CPT

## 2023-06-07 PROCEDURE — 99284 EMERGENCY DEPT VISIT MOD MDM: CPT | Performed by: STUDENT IN AN ORGANIZED HEALTH CARE EDUCATION/TRAINING PROGRAM

## 2023-06-07 PROCEDURE — 84295 ASSAY OF SERUM SODIUM: CPT

## 2023-06-07 PROCEDURE — 84132 ASSAY OF SERUM POTASSIUM: CPT

## 2023-06-07 PROCEDURE — 70450 CT HEAD/BRAIN W/O DYE: CPT

## 2023-06-07 RX ADMIN — IOHEXOL 100 ML: 350 INJECTION, SOLUTION INTRAVENOUS at 13:56

## 2023-06-07 NOTE — ASSESSMENT & PLAN NOTE
- s/p mechanical fall at home; head strike, takes Coumadin   - CT head, C-spine  - R groin pain; CT abdomen and pelvis  - INR check

## 2023-06-07 NOTE — PROGRESS NOTES
[address]  H&P  Name: Loi Pollard  MRN: [id number]  Unit/Bed#: [facility] I Date of Admission: 6/7/2023   Date of Service: 6/7/2023 I Hospital Day: 0    Assessment/Plan   Fall  Assessment & Plan  - s/p mechanical fall at home; head strike, takes Coumadin   - CT head, C-spine  - R groin pain; CT abdomen and pelvis  - INR check           Trauma Alert: Level B   Model of Arrival: Ambulance    Trauma Team: Attending Raoul Halsted and Residents Aislinn Holm  Consultants:     None     History of Present Illness     Chief Complaint: R groin pain  Mechanism:Fall     HPI:    Skip Bal is a 81 y o  male who presents via EMS as a Level B trauma alert for evaluation s/p mechanical fall with head strike in context of Coumadin usage  Patient states that he was ambulating with his walker at home just prior to arrival and he lost his balance while crossing the threshold into his living room, subsequently falling forward and hitting the R side of his head on the floor  Patient denies LOC  States that he twisted his R leg during the fall  Patient unable to get back to standing position  EMS was called  Vitals stable during transport  Now complains of R groin pain  No headache, visual changes, dizziness, neck pain, chest pain, abdominal pain, vomiting, back pain, paresthesias to the lower extremities, knee pain, ankle pain, foot pain  ED course: CT head and C spine show no emergent findings  CT abdomen and pelvis only significant for age indeterminate compression fx of L4  Patient has no midline tenderness of lumbar spine and is neurologically intact  Hemodynamically stable during evaluation  INR 2 8  Patient states that he is currently switching providers to manage his Coumadin better, as he was recently admitted to hospital with internal bleeding thought to be related to supratherapeutic INR  Patient has redraw of his INR tomorrow and will call cardiologist tomorrow to schedule a follow up appointment  On re-evaluation, has no tenderness of the abdomen  Patient able to ambulate in the ED without difficulty  He was given instructions regarding follow up and strict return precautions  Review of Systems   Constitutional: Negative  HENT: Negative  Eyes: Negative  Respiratory: Negative  Cardiovascular: Positive for leg swelling  Gastrointestinal: Negative  Endocrine: Negative  Genitourinary: Negative  Musculoskeletal: Positive for arthralgias (R groin) and gait problem (ambulates with walker)  Negative for back pain and neck pain  Neurological: Negative for dizziness, syncope, light-headedness, numbness and headaches  12-point, complete review of systems was reviewed and negative except as stated above  Historical Information     No past medical history on file  No past surgical history on file  There is no immunization history on file for this patient  Family History: Non-contributory    1  Before the illness or injury that brought you to the Emergency, did you need someone to help you on a regular basis? 1=Yes   2  Since the illness or injury that brought you to the Emergency, have you needed more help than usual to take care of yourself? 0=No   3  Have you been hospitalized for one or more nights during the past 6 months (excluding a stay in the Emergency Department)? 1=Yes   4  In general, do you see well? 0=Yes   5  In general, do you have serious problems with your memory? 0=No   6  Do you take more than three different medications everyday?  1=Yes   TOTAL   3     Did you order a geriatric consult if the score was 2 or greater?: n/a; patient discharged     Meds/Allergies   all current active meds have been reviewed   No Known Allergies    Objective   Initial Vitals:   Temperature: (!) 97 °F (36 1 °C) (06/07/23 1338)  Pulse: 72 (06/07/23 1338)  Respirations: 16 (06/07/23 1338)  Blood Pressure: 110/79 (06/07/23 1338)    Primary Survey:   Airway:        Status: patent; Pre-hospital Interventions: none        Hospital Interventions: none  Breathing:        Pre-hospital Interventions: none       Effort: normal       Right breath sounds: normal       Left breath sounds: normal  Circulation:        Rhythm: regular       Rate: regular   Right Pulses Left Pulses    R radial: 2+  R femoral: 2+  R pedal: 2+     L radial: 2+  L femoral: 2+  L pedal: 2+       Disability:        GCS: Eye: 4; Verbal: 5 Motor: 6 Total: 15       Right Pupil:       Left Pupil:     R Motor Strength L Motor Strength    R : 5/5  R dorsiflex: 5/5  R plantarflex: 5/5 L : 5/5  L dorsiflex: 5/5  L plantarflex: 5/5        Sensory:  No sensory deficit  Exposure:       Completed: Yes      Secondary Survey:  Physical Exam  Nursing note reviewed  Constitutional:       General: He is not in acute distress  Appearance: Normal appearance  He is obese  He is not ill-appearing or toxic-appearing  HENT:      Head: Normocephalic and atraumatic  Comments: Abrasion over R parietal scalp; no scalp hematomas, Battles sign, raccoons sign, facial tenderness  Right Ear: External ear normal       Left Ear: External ear normal       Nose: Nose normal  No congestion or rhinorrhea  Mouth/Throat:      Mouth: Mucous membranes are moist       Pharynx: Oropharynx is clear  Eyes:      General: No scleral icterus  Extraocular Movements: Extraocular movements intact  Pupils: Pupils are equal, round, and reactive to light  Cardiovascular:      Rate and Rhythm: Normal rate and regular rhythm  Pulses: Normal pulses  Heart sounds: Normal heart sounds  Pulmonary:      Effort: Pulmonary effort is normal  No respiratory distress  Breath sounds: Normal breath sounds  No wheezing, rhonchi or rales  Comments: No chest wall bruising  Chest:      Chest wall: No tenderness  Abdominal:      General: There is no distension  Tenderness: There is no abdominal tenderness   There is no guarding or rebound  Comments: 8 cm abrasion to the R inguinal crease; no tenderness or bruising     Musculoskeletal:      Cervical back: Normal range of motion  No rigidity or tenderness  Thoracic back: Normal  No swelling, deformity or tenderness  Lumbar back: Normal  No swelling, deformity, tenderness or bony tenderness  Comments: There is tenderness in the R inguinal region; no palpable deformities to the R hip  No pain in the lateral hip  No ecchymosis  No shortening or external rotation of the lower extremities  Bilateral lower legs are swollen with 2+ edema  DP pulse 2+ symmetric  Can lift both legs off stretcher against gravity without pain  Neurological:      Mental Status: He is alert  Invasive Devices     Peripheral Intravenous Line  Duration           Peripheral IV 06/07/23 Right Antecubital <1 day              Lab Results: Results: I have personally reviewed all pertinent laboratory/tests results    Imaging Results: I have personally reviewed pertinent reports  Chest Xray(s): see imaging   FAST exam(s): see imaging   CT Scan(s): see imaging   Additional Xray(s): see imaging         Code Status: No Order  Advance Directive and Living Will:      Power of :    POLST:    I have spent 30 minutes with Patient and family today in which greater than 50% of this time was spent in counseling/coordination of care regarding Diagnostic results

## 2023-06-07 NOTE — DISCHARGE INSTRUCTIONS
CT abdomen and pelvis findings: CT abdomen pelvis w contrast: Age-indeterminate severe central compression deformity of L4  No retropulsion  Correlate to exclude focal tenderness  , Otherwise no fracture suspected  Please follow up with your lab draw tomorrow as planned and call cardiologist to discuss INR  You may need medication adjustment  Return to the ED with any recurrence of falls, chest pain, difficulty breathing, abdominal pain, difficulty ambulating, numbness or tingling

## 2023-06-08 ENCOUNTER — ANTICOAG VISIT (OUTPATIENT)
Dept: CARDIOLOGY CLINIC | Facility: CLINIC | Age: 88
End: 2023-06-08

## 2023-06-08 ENCOUNTER — HOME CARE VISIT (OUTPATIENT)
Dept: HOME HEALTH SERVICES | Facility: HOME HEALTHCARE | Age: 88
End: 2023-06-08
Payer: MEDICARE

## 2023-06-08 VITALS
SYSTOLIC BLOOD PRESSURE: 116 MMHG | HEART RATE: 67 BPM | TEMPERATURE: 97.5 F | OXYGEN SATURATION: 98 % | RESPIRATION RATE: 18 BRPM | DIASTOLIC BLOOD PRESSURE: 62 MMHG

## 2023-06-08 PROCEDURE — 400014 VN F/U

## 2023-06-08 PROCEDURE — G0299 HHS/HOSPICE OF RN EA 15 MIN: HCPCS

## 2023-06-10 ENCOUNTER — HOME CARE VISIT (OUTPATIENT)
Dept: HOME HEALTH SERVICES | Facility: HOME HEALTHCARE | Age: 88
End: 2023-06-10
Payer: MEDICARE

## 2023-06-10 VITALS
SYSTOLIC BLOOD PRESSURE: 120 MMHG | DIASTOLIC BLOOD PRESSURE: 80 MMHG | HEART RATE: 62 BPM | OXYGEN SATURATION: 97 % | TEMPERATURE: 98.2 F | RESPIRATION RATE: 16 BRPM

## 2023-06-10 PROCEDURE — G0300 HHS/HOSPICE OF LPN EA 15 MIN: HCPCS

## 2023-06-13 ENCOUNTER — OFFICE VISIT (OUTPATIENT)
Dept: WOUND CARE | Facility: HOSPITAL | Age: 88
End: 2023-06-13
Payer: MEDICARE

## 2023-06-13 ENCOUNTER — HOME CARE VISIT (OUTPATIENT)
Dept: HOME HEALTH SERVICES | Facility: HOME HEALTHCARE | Age: 88
End: 2023-06-13
Payer: MEDICARE

## 2023-06-13 VITALS
RESPIRATION RATE: 12 BRPM | HEART RATE: 59 BPM | DIASTOLIC BLOOD PRESSURE: 64 MMHG | TEMPERATURE: 97 F | SYSTOLIC BLOOD PRESSURE: 130 MMHG

## 2023-06-13 DIAGNOSIS — I87.311 CHRONIC VENOUS HYPERTENSION (IDIOPATHIC) WITH ULCER OF RIGHT LOWER EXTREMITY (HCC): Primary | ICD-10-CM

## 2023-06-13 DIAGNOSIS — L89.152 PRESSURE ULCER OF SACRAL REGION, STAGE 2 (HCC): ICD-10-CM

## 2023-06-13 DIAGNOSIS — L97.919 CHRONIC VENOUS HYPERTENSION (IDIOPATHIC) WITH ULCER OF RIGHT LOWER EXTREMITY (HCC): Primary | ICD-10-CM

## 2023-06-13 DIAGNOSIS — L97.312 NON-PRESSURE CHRONIC ULCER OF RIGHT ANKLE WITH FAT LAYER EXPOSED (HCC): ICD-10-CM

## 2023-06-13 DIAGNOSIS — E11.40 TYPE 2 DIABETES MELLITUS WITH DIABETIC NEUROPATHY, WITHOUT LONG-TERM CURRENT USE OF INSULIN (HCC): ICD-10-CM

## 2023-06-13 PROCEDURE — 97597 DBRDMT OPN WND 1ST 20 CM/<: CPT | Performed by: NURSE PRACTITIONER

## 2023-06-13 PROCEDURE — 99213 OFFICE O/P EST LOW 20 MIN: CPT | Performed by: NURSE PRACTITIONER

## 2023-06-13 PROCEDURE — 97598 DBRDMT OPN WND ADDL 20CM/<: CPT | Performed by: NURSE PRACTITIONER

## 2023-06-13 RX ORDER — LIDOCAINE 40 MG/G
CREAM TOPICAL ONCE
Status: COMPLETED | OUTPATIENT
Start: 2023-06-13 | End: 2023-06-13

## 2023-06-13 RX ADMIN — LIDOCAINE 1 APPLICATION.: 40 CREAM TOPICAL at 15:05

## 2023-06-13 NOTE — PATIENT INSTRUCTIONS
Orders Placed This Encounter   Procedures    Wound cleansing and dressings     Wound location: Right lower leg  Change dressing daily and as needed  You may changed dressing on days [facility] does not visit  You may remove the dressing and shower  Do not leave wound open to air, apply new dressing immediately  Soak wound with 1/4 strength dakins moistened gauze for 10 mins  Remove gauze and rinse well with saline  Apply Zinc cream to foster wound  Apply Alginate Ag to the open wounds  Cover with [employer] absorbent pad, may use equivalent if unable to obtain mextra  You may use baby diaper instead of super absorbent pad  Secure with roll guaze and tape  Apply ace wrap on Right lower leg      Wound location: Sacrum and Right thigh  Apply triad cream daily and ok wounds open to air         Above treatment performed at wound care center     Standing Status:   Future     Standing Expiration Date:   6/13/2024    Wound compression and edema control     Right Legz; Ace Bandage: Apply from base of toes to behind the knee  Apply in AM, may remove for sleep  Avoid prolonged standing in one place  Elevate leg(s) above the level of the heart when sitting or as much as possible    Continue use of compression pumps at home     Standing Status:   Future     Standing Expiration Date:   6/13/2024    Wound home care     Continue [facility] for wound care     Standing Status:   Future     Standing Expiration Date:   6/13/2024

## 2023-06-13 NOTE — PROGRESS NOTES
"Patient ID: Priscilla Barry is a 81 y o  male Date of Birth 1935     Chief Complaint  Chief Complaint   Patient presents with   • Follow Up Wound Care Visit     Wounds; sacrum and RLE       Allergies  Tramadol    Assessment:     Diagnoses and all orders for this visit:    Chronic venous hypertension (idiopathic) with ulcer of right lower extremity (HCC)  -     lidocaine (LMX) 4 % cream  -     Wound cleansing and dressings; Future  -     Wound compression and edema control; Future  -     Wound home care; Future  -     Debridement    Non-pressure chronic ulcer of right ankle with fat layer exposed ([address] )  -     lidocaine (LMX) 4 % cream  -     Wound cleansing and dressings; Future  -     Wound compression and edema control; Future  -     Wound home care; Future  -     Debridement    Type 2 diabetes mellitus with diabetic neuropathy, without long-term current use of insulin (HCC)    Pressure ulcer of sacral region, stage 2 (HCC)              Debridement   Wound 01/13/23 Ankle Right;Medial    Universal Protocol:  Consent: Written consent obtained  Consent given by: patient  Time out: Immediately prior to procedure a \"time out\" was called to verify the correct patient, procedure, equipment, support staff and site/side marked as required    Timeout called at: 6/13/2023 3:12 PM   Patient identity confirmed: verbally with patient      Performed by: NP  Debridement type: selective  Pain control: lidocaine 4%  Pre-debridement measurements  Length (cm): 12  Width (cm): 7  Depth (cm): 0 1  Surface Area (cm^2): 84  Volume (cm^3): 8 4    Post-debridement measurements  Length (cm): 12  Width (cm): 7  Depth (cm): 0 1  Percent debrided: 100%  Surface Area (cm^2): 84  Area debrided (cm^2): 84  Volume (cm^3): 8 4  Devitalized tissue debrided: biofilm, fibrin and slough  Instrument(s) utilized: curette  Bleeding: small  Hemostasis obtained with: pressure  Procedural pain (0-10): 0  Post-procedural pain: 0   Response to treatment: " procedure was tolerated well          Plan:  1  F/u visit  Right ankle wound debrided  Sacral and abdominal fold wounds cleansed with NSS and gauze  Wounds measuring slightly smaller  Continue current plan of care  Recommended changing right ankle wound BID if drainage is leaking through bandage  Patient will follow up in 3 weeks  Wound 01/13/23 Ankle Right;Medial (Active)   Wound Image Images linked 06/13/23 1441   Wound Description Yellow;Pink;Slough; Beefy red;Granulation tissue 06/13/23 1449   Wound Length (cm) 12 cm 06/13/23 1449   Wound Width (cm) 7 cm 06/13/23 1449   Wound Depth (cm) 0 1 cm 06/13/23 1449   Wound Surface Area (cm^2) 84 cm^2 06/13/23 1449   Wound Volume (cm^3) 8 4 cm^3 06/13/23 1449   Calculated Wound Volume (cm^3) 8 4 cm^3 06/13/23 1449   Change in Wound Size % -285 32 06/13/23 1449   Drainage Amount Large 06/13/23 1449   Drainage Description Serosanguineous 06/13/23 1449   Non-staged Wound Description Full thickness 06/13/23 1449   Patient Tolerance Tolerated well 06/13/23 1449   Dressing Status Intact 06/13/23 1449       Wound 05/24/23 Sacrum (Active)   Wound Image Images linked 06/13/23 1446   Wound Description Pink 06/13/23 1447   Deedee-wound Assessment Intact 06/13/23 1447   Wound Length (cm) 0 8 cm 06/13/23 1447   Wound Width (cm) 0 2 cm 06/13/23 1447   Wound Depth (cm) 0 1 cm 06/13/23 1447   Wound Surface Area (cm^2) 0 16 cm^2 06/13/23 1447   Wound Volume (cm^3) 0 016 cm^3 06/13/23 1447   Calculated Wound Volume (cm^3) 0 02 cm^3 06/13/23 1447   Change in Wound Size % 99 92 06/13/23 1447   Drainage Amount Scant 06/13/23 1447   Patient Tolerance Tolerated well 06/13/23 1447   Dressing Status Other (Comment) 06/13/23 1447       Wound 05/24/23 Other (comment) Thigh Anterior;Proximal;Right (Active)   Wound Image Images linked 06/13/23 1500   Wound Description Dry 06/13/23 1448   Deedee-wound Assessment Dry 06/13/23 1448   Wound Length (cm) 0 cm 06/13/23 1448   Wound Width (cm) 0 cm 06/13/23 1448   Wound Depth (cm) 0 cm 06/13/23 1448   Wound Surface Area (cm^2) 0 cm^2 06/13/23 1448   Wound Volume (cm^3) 0 cm^3 06/13/23 1448   Calculated Wound Volume (cm^3) 0 cm^3 06/13/23 1448   Change in Wound Size % 100 06/13/23 1448   Drainage Amount None 06/13/23 1448   Non-staged Wound Description Not applicable 13/52/25 1107   Patient Tolerance Tolerated well 06/13/23 1448   Dressing Status Removed 06/13/23 1448       Wound 01/13/23 Ankle Right;Medial (Active)   Date First Assessed/Time First Assessed: 01/13/23 1308   Location: Ankle  Wound Location Orientation: Right;Medial       Wound 05/24/23 Sacrum (Active)   Date First Assessed/Time First Assessed: 05/24/23 2214   Location: Sacrum       Wound 05/24/23 Other (comment) Thigh Anterior;Proximal;Right (Active)   Date First Assessed/Time First Assessed: 05/24/23 2200   Present on Original Admission: Yes  Primary Wound Type: Other (comment)  Location: Thigh  Wound Location Orientation: Anterior;Proximal;Right  Dressing Status: Open to air       [REMOVED] Incision 08/17/18 Leg Right (Removed)   Resolved Date: (c) 09/05/22  Date First Assessed/Time First Assessed: 08/17/18 1127   Location: Leg  Wound Location Orientation: Right       [REMOVED] Wound 09/05/22 Surgical Abdomen Right (Removed)   Resolved Date: 10/25/22  Date First Assessed/Time First Assessed: 09/05/22 1505   Pre-Existing Wound: Yes  Primary Wound Type: Surgical  Location: Abdomen  Wound Location Orientation: Right  Wound Description (Comments): IR Drain tube       [REMOVED] Wound 10/31/22 Surgical Abdomen Right;Quadrant; Upper (Removed)   Resolved Date: 11/30/22  Date First Assessed/Time First Assessed: 10/31/22 1422   Pre-Existing Wound: Yes  Primary Wound Type: Surgical  Location: Abdomen  Wound Location Orientation: Right;Quadrant; Upper  Wound Description (Comments): IR Gail tube p         [REMOVED] Wound 11/09/22 Venous Ulcer Ankle Right;Medial (Removed)   Resolved Date: 11/30/22  Date First Assessed/Time First Assessed: 11/09/22 2200   Pre-Existing Wound: No  Primary Wound Type: Venous Ulcer  Location: Ankle  Wound Location Orientation: Right;Medial  Dressing Status: Clean;Dry; Intact  Wound Outcome: He    [REMOVED] Wound 11/11/22 Pressure Injury Buttocks Right; Inner (Removed)   Resolved Date: 11/30/22  Date First Assessed/Time First Assessed: 11/11/22 1200   Pre-Existing Wound: No  Primary Wound Type: Pressure Injury  Location: Buttocks  Wound Location Orientation: Right; Inner  Wound Description (Comments): stage 2 pressure     [REMOVED] Wound 12/16/22 Other (comment) Leg Inner;Right (Removed)   Resolved Date: 03/03/23  Date First Assessed/Time First Assessed: 12/16/22 0909   Pre-Existing Wound: No  Primary Wound Type: Other (comment)  Location: Leg  Wound Location Orientation: Inner;Right  Dressing Status: Clean;Dry; Intact  Wound Outcome: Ot    [REMOVED] Wound 02/17/23 Venous Ulcer Leg Right; Lower;Mid;Anterior (Removed)   Resolved Date: 03/24/23  Date First Assessed/Time First Assessed: 02/17/23 1433   Primary Wound Type: Venous Ulcer  Location: Leg  Wound Location Orientation: Right; Lower;Mid;Anterior  Wound Outcome: Healed       [REMOVED] Wound 03/03/23 Leg Right;Medial (Removed)   Resolved Date: 05/25/23  Date First Assessed/Time First Assessed: 03/03/23 1329   Location: Leg  Wound Location Orientation: Right;Medial  Wound Outcome: Healed       [REMOVED] Wound 05/24/23 Anterior;Right (Removed)   Resolved Date: 05/25/23  Date First Assessed/Time First Assessed: 05/24/23 2216   Wound Location Orientation: Anterior;Right       [REMOVED] Wound 05/24/23 Other (comment) Thigh Left;Posterior;Proximal (Removed)   Resolved Date/Resolved Time: 06/13/23 1436  Date First Assessed/Time First Assessed: 05/24/23 2200   Present on Original Admission: Yes  Primary Wound Type: Other (comment)  Location: Thigh  Wound Location Orientation: Left;Posterior;Proximal  Dressin           Subjective:      F/u visit venous ulcer of right ankle, sacral ulcer and thigh wound  No new complaints  He denies any pain, fevers, or chills  The following portions of the patient's history were reviewed and updated as appropriate:   He  has a past medical history of Anemia, Arthritis, Atrial fibrillation ([facility] ), Basal cell carcinoma (03/22/2022), CHF (congestive heart failure) ([place] [address] ), Diabetes mellitus ([place] [address] ), DVT (deep vein thrombosis) in pregnancy (1966), DVT (deep venous thrombosis) ([facility] ) (1966), Dyslipidemia, Encephalopathy acute (11/4/2022), GERD (gastroesophageal reflux disease), Hyperlipidemia, Hypertension, Hypomagnesemia (10/19/2022), Irregular heart beat, Morbid obesity due to excess calories ([place] [address] ), Pulmonary embolism ([place] [address] ), Sepsis ([place] [address] ), Squamous cell skin cancer (07/30/2020), and Visual impairment    He   Patient Active Problem List    Diagnosis Date Noted   • Fall 06/07/2023   • Pulmonary embolism ([place] [address] )    • CHF (congestive heart failure) ([place] [address] )    • Melena 05/24/2023   • 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   • COVID-19 04/05/2023   • Pleural effusion on right 04/05/2023   • Calculus of gallbladder 03/08/2023   • Cholecystostomy care ([place] [address] ) 11/21/2022   • Gout 11/21/2022   • Acute on chronic cholecystitis 11/04/2022   • Acute cholecystitis 08/30/2022   • Benign prostatic hyperplasia with lower urinary tract symptoms 08/09/2022   • Morbid obesity    • CKD (chronic kidney disease) stage 3, GFR 30-59 ml/min ([place] [address] ) 05/07/2022   • Acute kidney injury superimposed on stage 3 chronic kidney disease  11/18/2021   • Osteoarthritis, knee 11/15/2021   • Status post right knee replacement 11/15/2021   • Hyperlipidemia 03/29/2021   • Swelling of limb 02/04/2021   • History of venous thromboembolism 09/10/2020   • Secondary lymphedema 09/24/2018   • Venous ulcer of right ankle 07/11/2018   • Acute on chronic diastolic CHF 93/73/6238   • History of AVR 03/22/2018   • Thrombophlebitis 03/22/2018   • History of ventricular tachycardia 03/22/2018   • Chronic bilateral low back pain without sciatica 04/24/2017   • BPH (benign prostatic hyperplasia) 04/20/2017   • Obesity, unspecified obesity severity, unspecified obesity type 04/04/2017   • Hydrocele 04/04/2017   • Chronic anticoagulation 04/04/2017   • Acute on chronic anemia 04/04/2017   • Renal cyst 04/04/2017   • 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 08/11/2015   • Ventricular tachycardia 04/15/2014   • Paroxysmal atrial fibrillation  03/19/2014   • Edema 03/19/2014   • Endocarditis 03/19/2014   • Type 2 diabetes mellitus with diabetic neuropathy 08/19/2013   • Peripheral venous insufficiency 08/19/2013   • Cardiomegaly 08/19/2013   • PVD (peripheral vascular disease) ([facility] ) 04/19/2013   • Dyslipidemia 04/19/2013     He  has a past surgical history that includes Vascular surgery; Aortic valve replacement; Total knee arthroplasty (Left); Cardiac surgery (02/2014); Insert / replace / remove pacemaker; Cardiac defibrillator placement (04/2014); Joint replacement (Left); [place] tooth extraction; Colonoscopy; pr ligj divj&strip long saph saphfem junct kne/belw (Right, 8/17/2018); Vena cava filter placement; Replacement total knee (Right); Mohs surgery (07/30/2020); Mohs surgery (04/18/2022); IR cholecystostomy tube placement (9/1/2022); IR cholecystostomy tube check/change/reposition/reinsertion/upsize (10/13/2022); IR cholecystostomy tube check/change/reposition/reinsertion/upsize (10/19/2022); IR cholecystostomy tube check/change/reposition/reinsertion/upsize (10/27/2022); IR cholecystostomy tube check/change/reposition/reinsertion/upsize (11/7/2022); IR cholecystostomy tube check/change/reposition/reinsertion/upsize (11/10/2022); IR cholecystostomy tube check/change/reposition/reinsertion/upsize (12/1/2022); IR cholecystostomy tube check/change/reposition/reinsertion/upsize (1/6/2023);  IR cholecystostomy tube check/change/reposition/reinsertion/upsize (1/24/2023); IR aspiration bakers cyst (3/8/2023); IR cholecystostomy tube check/change/reposition/reinsertion/upsize (3/15/2023); IR drainage tube check and/or removal (3/22/2023); IR drainage tube placement (4/6/2023); and IR drainage tube check and/or removal (4/10/2023)  His family history includes Arthritis in his daughter, father, and mother; Cancer in his father; Stroke in his mother  He  reports that he has never smoked  He has never used smokeless tobacco  He reports that he does not currently use alcohol  He reports that he does not use drugs  Current Outpatient Medications   Medication Sig Dispense Refill   • Acetaminophen (TYLENOL EXTRA STRENGTH PO) Take 500 mg by mouth if needed (for pain as needed)  Indications: pain as needed     • atorvastatin (LIPITOR) 40 mg tablet Take 40 mg by mouth daily after dinner Atorvastatin Calcium 40 MG Oral Tablet Take 1 tablet daily  Refills: 0  Active      • B Complex-C (SUPER B COMPLEX PO) Take 1 capsule by mouth daily      • Cholecalciferol 25 MCG (1000 UT) capsule Take 3,000 Units by mouth daily  Indications: Vitamin D Deficiency     • Diclofenac Sodium (VOLTAREN) 1 % Apply 2 g topically 4 (four) times a day     • fluticasone (FLONASE) 50 mcg/act nasal spray 2 sprays into each nostril daily as needed Shake liquid and spray     • gabapentin (NEURONTIN) 100 mg capsule Take 100 mg by mouth daily     • Iron Combinations (NIFEREX) TABS Take 1 tablet by mouth in the morning  5   • lubiprostone (AMITIZA) 24 mcg capsule Take 24 mcg by mouth 2 (two) times a day with meals  Indications: Chronic Constipation of Unknown Cause     • metFORMIN (GLUCOPHAGE) 1000 MG tablet Take 1,000 mg by mouth daily with dinner   Indications: Type 2 Diabetes     • Multiple Vitamin (MULTIVITAMINS PO) Take 1 tablet by mouth daily     • omeprazole (PriLOSEC) 20 mg delayed release capsule Omeprazole 20 MG Oral Tablet Delayed Release Take 1 tablet daily  Refills: 0  Active     • polyethylene glycol (GLYCOLAX) 17 GM/SCOOP powder Take 17 g by mouth 2 (two) times a day     • Potassium Chloride ER 20 MEQ TBCR Take 20 mEq by mouth in the morning  Indications: Low Amount of Potassium in the Blood     • senna-docusate sodium (SENOKOT S) 8 6-50 mg per tablet Take 1 tablet by mouth daily at bedtime 30 tablet 0   • sodium hypochlorite (DAKIN'S HALF-STRENGTH) external solution Apply 1 application  topically daily At each dressing change 473 mL 0   • sotalol (BETAPACE) 80 mg tablet Take 1 tablet (80 mg total) by mouth daily 60 tablet 5   • torsemide 40 MG TABS Take 40 mg by mouth 2 (two) times a day 60 tablet 0   • warfarin (COUMADIN) 2 mg tablet Take 2 tablets (4 mg total) by mouth daily Acknowledge (Patient taking differently: Take 4 mg by mouth daily  6 7 23   Coumadin 4 mg daily  PT INR  6 13 23  Indications: Atrial Fibrillation) 14 tablet 0     No current facility-administered medications for this visit  Facility-Administered Medications Ordered in Other Visits   Medication Dose Route Frequency Provider Last Rate Last Admin   • cefTRIAXone (ROCEPHIN) 2,000 mg in dextrose 5 % 50 mL IVPB  2,000 mg Intravenous Once Alayna Fish MD       • sodium chloride 0 9 % infusion  75 mL/hr Intravenous Continuous Alayna Fish MD   Stopped at 05/26/23 0430     He is allergic to tramadol       Review of Systems   Constitutional: Negative  HENT: Negative for ear pain and hearing loss  Eyes: Negative for pain  Respiratory: Negative for chest tightness and shortness of breath  Cardiovascular: Positive for leg swelling  Negative for chest pain and palpitations  Gastrointestinal: Negative for diarrhea, nausea and vomiting  Genitourinary: Negative for dysuria  Musculoskeletal: Positive for gait problem  Skin: Positive for wound  Neurological: Negative for tremors and weakness  Psychiatric/Behavioral: Negative for behavioral problems, confusion and suicidal ideas           Objective:       Wound 01/13/23 Ankle Right;Medial (Active)   Wound Image Images linked 06/13/23 1441   Wound Description Yellow;Pink;Slough; Beefy red;Granulation tissue 06/13/23 1449   Wound Length (cm) 12 cm 06/13/23 1449   Wound Width (cm) 7 cm 06/13/23 1449   Wound Depth (cm) 0 1 cm 06/13/23 1449   Wound Surface Area (cm^2) 84 cm^2 06/13/23 1449   Wound Volume (cm^3) 8 4 cm^3 06/13/23 1449   Calculated Wound Volume (cm^3) 8 4 cm^3 06/13/23 1449   Change in Wound Size % -285 32 06/13/23 1449   Drainage Amount Large 06/13/23 1449   Drainage Description Serosanguineous 06/13/23 1449   Non-staged Wound Description Full thickness 06/13/23 1449   Patient Tolerance Tolerated well 06/13/23 1449   Dressing Status Intact 06/13/23 1449       Wound 05/24/23 Sacrum (Active)   Wound Image Images linked 06/13/23 1446   Wound Description Pink 06/13/23 1447   Deedee-wound Assessment Intact 06/13/23 1447   Wound Length (cm) 0 8 cm 06/13/23 1447   Wound Width (cm) 0 2 cm 06/13/23 1447   Wound Depth (cm) 0 1 cm 06/13/23 1447   Wound Surface Area (cm^2) 0 16 cm^2 06/13/23 1447   Wound Volume (cm^3) 0 016 cm^3 06/13/23 1447   Calculated Wound Volume (cm^3) 0 02 cm^3 06/13/23 1447   Change in Wound Size % 99 92 06/13/23 1447   Drainage Amount Scant 06/13/23 1447   Patient Tolerance Tolerated well 06/13/23 1447   Dressing Status Other (Comment) 06/13/23 1447       Wound 05/24/23 Other (comment) Thigh Anterior;Proximal;Right (Active)   Wound Image Images linked 06/13/23 1500   Wound Description Dry 06/13/23 1448   Deedee-wound Assessment Dry 06/13/23 1448   Wound Length (cm) 0 cm 06/13/23 1448   Wound Width (cm) 0 cm 06/13/23 1448   Wound Depth (cm) 0 cm 06/13/23 1448   Wound Surface Area (cm^2) 0 cm^2 06/13/23 1448   Wound Volume (cm^3) 0 cm^3 06/13/23 1448   Calculated Wound Volume (cm^3) 0 cm^3 06/13/23 1448   Change in Wound Size % 100 06/13/23 1448   Drainage Amount None 06/13/23 1448   Non-staged Wound Description Not applicable 66/34/46 3319   Patient Tolerance Tolerated well 06/13/23 1448   Dressing Status Removed 06/13/23 1448       /64   Pulse 59   Temp (!) 97 °F (36 1 °C)   Resp 12                         Wound Instructions:  Orders Placed This Encounter   Procedures   • Wound cleansing and dressings     Wound location: Right lower leg  Change dressing daily and as needed  You may changed dressing on days [facility] does not visit  You may remove the dressing and shower  Do not leave wound open to air, apply new dressing immediately  Soak wound with 1/4 strength dakins moistened gauze for 10 mins  Remove gauze and rinse well with saline  Apply Zinc cream to foster wound  Apply Alginate Ag to the open wounds  Cover with [employer] absorbent pad, may use equivalent if unable to obtain mextra  You may use baby diaper instead of super absorbent pad  Secure with roll guaze and tape  Apply ace wrap on Right lower leg      Wound location: Sacrum and Right thigh  Apply triad cream daily and ok wounds open to air         Above treatment performed at wound care center     Standing Status:   Future     Standing Expiration Date:   6/13/2024   • Wound compression and edema control     Right Legz; Ace Bandage: Apply from base of toes to behind the knee  Apply in AM, may remove for sleep  Avoid prolonged standing in one place  Elevate leg(s) above the level of the heart when sitting or as much as possible  Continue use of compression pumps at home     Standing Status:   Future     Standing Expiration Date:   6/13/2024   • Wound home care     Continue [facility] for wound care     Standing Status:   Future     Standing Expiration Date:   6/13/2024   • Debridement     This order was created via procedure documentation        Diagnosis ICD-10-CM Associated Orders   1   Chronic venous hypertension (idiopathic) with ulcer of right lower extremity (HCC)  I87 311 lidocaine (LMX) 4 % cream    L97 919 Wound cleansing and dressings     Wound compression and edema control     Wound home care Debridement      2  Non-pressure chronic ulcer of right ankle with fat layer exposed ([facility])  L97 312 lidocaine (LMX) 4 % cream     Wound cleansing and dressings     Wound compression and edema control     Wound home care     Debridement      3  Type 2 diabetes mellitus with diabetic neuropathy, without long-term current use of insulin ([facility])  E11 40       4   Pressure ulcer of sacral region, stage 2 ([facility] [address] )  B73 [id number]

## 2023-06-14 NOTE — SEPSIS NOTE
Sepsis Note   Milli Harkins 81 y.o. male MRN: [id number]  Unit/Bed#: ED 16 Encounter: [id number]       Initial Sepsis Screening     Row Name 06/14/23 1845                Is the patient's history suggestive of a new or worsening infection? Yes (Proceed)  -MB        Suspected source of infection suspect infection, source unknown  -MB        Indicate SIRS criteria --        Are two or more of the above signs & symptoms of infection both present and new to the patient? No  -MB        Assess for evidence of organ dysfunction: Are any of the below criteria present within 6 hours of suspected infection and SIRS criteria that are NOT considered to be chronic conditions? Lactate >/equal 4. 0;Creatinine > 2.0 AND > 0.5 above baseline  -MB        Date of presentation of severe sepsis 06/14/23  -MB        Time of presentation of severe sepsis 1923  -MB        Date of presentation of septic shock --        Time of presentation of septic shock --        Fluid Resuscitation: --        Sepsis Note: Click "NEXT" below (NOT "close") to generate sepsis note based on above information. YES (proceed by clicking "NEXT")  -MB              User Key  (r) = Recorded By, (t) = Taken By, (c) = Cosigned By    [address] [facility] Name Provider Type    MATTHEW Adam DO Resident                    There is no height or weight on file to calculate BMI.   Wt Readings from Last 1 Encounters:   06/07/23 136 kg (299 lb 13.2 oz)        Ideal body weight: 79.9 kg (176 lb 2.4 oz)  Adjusted ideal body weight: 102 kg (225 lb 9.9 oz)

## 2023-06-14 NOTE — ED ATTENDING ATTESTATION
6/14/2023  I, Joanna Tinoco MD, saw and evaluated the patient. I have discussed the patient with the resident/non-physician practitioner and agree with the resident's/non-physician practitioner's findings, Plan of Care, and MDM as documented in the resident's/non-physician practitioner's note, except where noted. All available labs and Radiology studies were reviewed. I was present for key portions of any procedure(s) performed by the resident/non-physician practitioner and I was immediately available to provide assistance. At this point I agree with the current assessment done in the Emergency Department. I have conducted an independent evaluation of this patient a history and physical is as follows:  Global weakness. 2 days of feeling like he can't get around and is globally weak. This morning, vomited his breakfast. Complains of diffuse abdominal pain. Not nauseated now. Denies abdominal surgery. Says his stools are darker than previously. Says he's urinating and therte's been no change in urine color. No fevers or chills. No falls. On exam, cracked mucus membranes, pale, dry appearing, a and o x 3, neck supple. Heart regular. Lungs clear. Abdomen with diffuse tenderness with some voluntary guarding. Extremities intact. Neuro nonfocal.   MEDICAL DECISION MAKING    Number and Complexity of Problems  • Differential diagnosis: sepsis, [id number], renal failure, heart failure, intraabdominal catastrophe  •     Medical Decision Making Data  • External documents reviewed: admission in may reviewed. Pt with h/o VTE, v tack, avr, on coumadin  • My EKG interpretation: ddd paced, wide  • My CT interpretation:   • My X-ray interpretation:   • My ultrasound interpretation:     CT abdomen pelvis wo contrast   Final Result      No acute abdominal or pelvic pathology within limitation of a noncontrast study. Small abdominal and pelvic ascites, new from 6/7/2023.  Essentially unchanged bilateral perinephric and periureteral fat stranding. No organized fluid collections to suggest an abscess. Small to moderate right pleural effusion, slightly enlarged from 6/7/2023. Multiple stable additional findings as above.                   Workstation performed: [id number]         XR chest 1 view portable   ED Interpretation   Wet read - pacemaker, sternotomy wires, increased perihilar markings but no acute pulm edema, effusion or consolidations      Final Result      Possible mild congestion and small effusions                  Workstation performed: [id number]         XR chest portable ICU    (Results Pending)       Labs Reviewed   CBC AND DIFFERENTIAL - Abnormal       Result Value Ref Range Status    WBC 6.08  4.31 - 10.16 Thousand/uL Final    RBC 3.21 (*) 3.88 - 5.62 Million/uL Final    Hemoglobin 9.4 (*) 12.0 - 17.0 g/dL Final    Hematocrit 31.7 (*) 36.5 - 49.3 % Final    MCV 99 (*) 82 - 98 fL Final    MCH 29.3  26.8 - 34.3 pg Final    MCHC 29.7 (*) 31.4 - 37.4 g/dL Final    RDW 19.1 (*) 11.6 - 15.1 % Final    MPV 9.2  8.9 - 12.7 fL Final    Platelets 038  007 - 390 Thousands/uL Final    nRBC 0  /100 WBCs Final    Neutrophils Relative 66  43 - 75 % Final    Immat GRANS % 2  0 - 2 % Final    Lymphocytes Relative 22  14 - 44 % Final    Monocytes Relative 10  4 - 12 % Final    Eosinophils Relative 0  0 - 6 % Final    Basophils Relative 0  0 - 1 % Final    Neutrophils Absolute 3.97  1.85 - 7.62 Thousands/µL Final    Immature Grans Absolute 0.13  0.00 - 0.20 Thousand/uL Final    Lymphocytes Absolute 1.33  0.60 - 4.47 Thousands/µL Final    Monocytes Absolute 0.61  0.17 - 1.22 Thousand/µL Final    Eosinophils Absolute 0.02  0.00 - 0.61 Thousand/µL Final    Basophils Absolute 0.02  0.00 - 0.10 Thousands/µL Final   COMPREHENSIVE METABOLIC PANEL - Abnormal    Sodium 130 (*) 135 - 147 mmol/L Final    Potassium 7.4 (*) 3.5 - 5.3 mmol/L Final    Comment: No Hemolysis present    Chloride 102  96 - 108 mmol/L Final    CO2 12 (*) 21 - 32 mmol/L Final    ANION GAP 16 (*) 4 - 13 mmol/L Final    BUN 81 (*) 5 - 25 mg/dL Final    Creatinine 8.70 (*) 0.60 - 1.30 mg/dL Final    Comment: Standardized to IDMS reference method    Glucose 43 (*) 65 - 140 mg/dL Final    Comment: If the patient is fasting, the ADA then defines impaired fasting glucose as > 100 mg/dL and diabetes as > or equal to 123 mg/dL. Specimen collection should occur prior to Sulfasalazine administration due to the potential for falsely depressed results. Specimen collection should occur prior to Sulfapyridine administration due to the potential for falsely elevated results. Calcium 9.2  8.3 - 10.1 mg/dL Final    Corrected Calcium 9.9  8.3 - 10.1 mg/dL Final    AST 28  5 - 45 U/L Final    Comment: Specimen collection should occur prior to Sulfasalazine administration due to the potential for falsely depressed results. ALT 19  12 - 78 U/L Final    Comment: Specimen collection should occur prior to Sulfasalazine and/or Sulfapyridine administration due to the potential for falsely depressed results. Alkaline Phosphatase 105  46 - 116 U/L Final    Total Protein 8.1  6.4 - 8.4 g/dL Final    Albumin 3.1 (*) 3.5 - 5.0 g/dL Final    Total Bilirubin 1.06 (*) 0.20 - 1.00 mg/dL Final    Comment: Use of this assay is not recommended for patients undergoing treatment with eltrombopag due to the potential for falsely elevated results.     eGFR 4  ml/min/1.73sq m Final    Narrative:     [place] guidelines for Chronic Kidney Disease (CKD):   •  Stage 1 with normal or high GFR (GFR > 90 mL/min/1.73 square meters)  •  Stage 2 Mild CKD (GFR = 60-89 mL/min/1.73 square meters)  •  Stage 3A Moderate CKD (GFR = 45-59 mL/min/1.73 square meters)  •  Stage 3B Moderate CKD (GFR = 30-44 mL/min/1.73 square meters)  •  Stage 4 Severe CKD (GFR = 15-29 mL/min/1.73 square meters)  •  Stage 5 End Stage CKD (GFR <15 mL/min/1.73 square meters)  Note: GFR calculation is accurate only with a steady state creatinine   LACTIC ACID, PLASMA (W/REFLEX IF RESULT > 2.0) - Abnormal    LACTIC ACID 9.7 (*) 0.5 - 2.0 mmol/L Final    Narrative:     Result may be elevated if tourniquet was used during collection. BLOOD GAS, VENOUS - Abnormal    pH, Pete 7.142 (*) 7.300 - 7.400 Final    pCO2, Pete 25.7 (*) 42.0 - 50.0 mm Hg Final    pO2, Pete 128.3 (*) 35.0 - 45.0 mm Hg Final    HCO3, Pete 8.6 (*) 24 - 30 mmol/L Final    Base Excess, Pete -18.8  mmol/L Final    O2 Content, Pete 13.4  ml/dL Final    O2 HGB, VENOUS 94.6 (*) 60.0 - 80.0 % Final   UA W REFLEX TO MICROSCOPIC WITH REFLEX TO CULTURE - Abnormal    Color, UA Yellow   Final    Clarity, UA Extra Turbid   Final    Specific Gravity, UA 1.023  1.003 - 1.030 Final    pH, UA 6.0  4.5, 5.0, 5.5, 6.0, 6.5, 7.0, 7.5, 8.0 Final    Leukocytes, UA Large (*) Negative Final    Nitrite, UA Negative  Negative Final    Protein,  (3+) (*) Negative mg/dl Final    Glucose, UA Trace (*) Negative mg/dl Final    Ketones, UA Negative  Negative mg/dl Final    Urobilinogen, UA <2.0  <2.0 mg/dl mg/dl Final    Bilirubin, UA Negative  Negative Final    Occult Blood, UA Moderate (*) Negative Final   LACTIC ACID 2 HOUR - Abnormal    LACTIC ACID 9.4 (*) 0.5 - 2.0 mmol/L Final    Narrative:     Result may be elevated if tourniquet was used during collection. PROTIME-INR - Abnormal    Protime 109.8 (*) 11.6 - 14.5 seconds Final    INR >15.00 (*) 0.84 - 1.19 Final   APTT - Abnormal    PTT 77 (*) 23 - 37 seconds Final    Comment: Therapeutic Heparin Range =  60-90 seconds   BASIC METABOLIC PANEL - Abnormal    Sodium 129 (*) 135 - 147 mmol/L Final    Potassium 8.1 (*) 3.5 - 5.3 mmol/L Final    Comment: Slightly Hemolyzed;  Results May be Affected    Chloride 101  96 - 108 mmol/L Final    CO2 10 (*) 21 - 32 mmol/L Final    ANION GAP 18 (*) 4 - 13 mmol/L Final    BUN 79 (*) 5 - 25 mg/dL Final    Creatinine 8.58 (*) 0.60 - 1.30 mg/dL Final    Comment: Standardized to IDMS reference method Glucose 21 (*) 65 - 140 mg/dL Final    Comment: Specimen collection should occur prior to Sulfasalazine administration due to the potential for falsely depressed results. Specimen collection should occur prior to Sulfapyridine administration due to the potential for falsely elevated results. If the patient is fasting, the ADA then defines impaired fasting glucose as > 100 mg/dL and diabetes as > or equal to 123 mg/dL.     Calcium 9.4  8.3 - 10.1 mg/dL Final    eGFR 5  ml/min/1.73sq m Final    Narrative:     [employer] guidelines for Chronic Kidney Disease (CKD):   •  Stage 1 with normal or high GFR (GFR > 90 mL/min/1.73 square meters)  •  Stage 2 Mild CKD (GFR = 60-89 mL/min/1.73 square meters)  •  Stage 3A Moderate CKD (GFR = 45-59 mL/min/1.73 square meters)  •  Stage 3B Moderate CKD (GFR = 30-44 mL/min/1.73 square meters)  •  Stage 4 Severe CKD (GFR = 15-29 mL/min/1.73 square meters)  •  Stage 5 End Stage CKD (GFR <15 mL/min/1.73 square meters)  Note: GFR calculation is accurate only with a steady state creatinine   URINE MICROSCOPIC - Abnormal    RBC, UA 10-20 (*) None Seen, 1-2 /hpf Final    WBC, UA Innumerable (*) None Seen, 1-2 /hpf Final    Epithelial Cells Occasional  None Seen, Occasional /hpf Final    Bacteria, UA Moderate (*) None Seen, Occasional /hpf Final    Hyaline Casts, UA 0-3 (*) None Seen /lpf Final    WBC Clumps Present   Final    Transitional Epithelial Cells Present   Final   POCT GLUCOSE - Abnormal    POC Glucose 57 (*) 65 - 140 mg/dl Final    Comment: CRITICAL VALUE NOTED REPEAT   POCT GLUCOSE - Abnormal    POC Glucose 47 (*) 65 - 140 mg/dl Final   POCT GLUCOSE - Abnormal    POC Glucose 56 (*) 65 - 140 mg/dl Final   LIPASE - Normal    Lipase 118  73 - 393 u/L Final   MAGNESIUM - Normal    Magnesium 2.1  1.6 - 2.6 mg/dL Final   HS TROPONIN I 0HR - Normal    hs TnI 0hr 14  "Refer to ACS Flowchart"- see link ng/L Final    Comment: Initial (time 0) result  If >=50 ng/L, Myocardial injury suggested ;  Type of myocardial injury and treatment strategy  to be determined. If 5-49 ng/L, a delta result at 2 hours and or 4 hours will be needed to further evaluate. If <4 ng/L, and chest pain has been >3 hours since onset, patient may qualify for discharge based on the HEART score in the ED. If <5 ng/L and <3hours since onset of chest pain, a delta result at 2 hours will be needed to further evaluate. HS Troponin 99th Percentile URL of a Health Population=12 ng/L with a 95% Confidence Interval of 8-18 ng/L. Second Troponin (time 2 hours)  If calculated delta >= 20 ng/L,  Myocardial injury suggested ; Type of myocardial injury and treatment strategy to be determined. If 5-49 ng/L and the calculated delta is 5-19 ng/L, consult medical service for evaluation. Continue evaluation for ischemia on ecg and other possible etiology and repeat hs troponin at 4 hours. If delta is <5 ng/L at 2 hours, consider discharge based on risk stratification via the HEART score (if in ED), or WILLIAN risk score in IP/Observation. HS Troponin 99th Percentile URL of a Health Population=12 ng/L with a 95% Confidence Interval of 8-18 ng/L.   HS TROPONIN I 2HR - Normal    hs TnI 2hr 16  "Refer to ACS Flowchart"- see link ng/L Final    Comment:                                              Initial (time 0) result  If >=50 ng/L, Myocardial injury suggested ;  Type of myocardial injury and treatment strategy  to be determined. If 5-49 ng/L, a delta result at 2 hours and or 4 hours will be needed to further evaluate. If <4 ng/L, and chest pain has been >3 hours since onset, patient may qualify for discharge based on the HEART score in the ED. If <5 ng/L and <3hours since onset of chest pain, a delta result at 2 hours will be needed to further evaluate.     HS Troponin 99th Percentile URL of a Health Population=12 ng/L with a 95% Confidence Interval of 8-18 ng/L.    Second Troponin (time 2 hours)  If calculated delta >= 20 ng/L,  Myocardial injury suggested ; Type of myocardial injury and treatment strategy to be determined. If 5-49 ng/L and the calculated delta is 5-19 ng/L, consult medical service for evaluation. Continue evaluation for ischemia on ecg and other possible etiology and repeat hs troponin at 4 hours. If delta is <5 ng/L at 2 hours, consider discharge based on risk stratification via the HEART score (if in ED), or WILLIAN risk score in IP/Observation. HS Troponin 99th Percentile URL of a Health Population=12 ng/L with a 95% Confidence Interval of 8-18 ng/L. Delta 2hr hsTnI 2  <20 ng/L Final   PROCALCITONIN TEST - Normal    Procalcitonin 0.25  <=0.25 ng/ml Final    Comment: Suspected Lower Respiratory Tract Infection (LRTI):  - LESS than or EQUAL to 0.25 ng/mL:   low likelihood for bacterial LRTI; antibiotics DISCOURAGED.  - GREATER than 0.25 ng/mL:   increased likelihood for bacterial LRTI; antibiotics ENCOURAGED. Suspected Sepsis:  - Strongly consider initiating antibiotics in ALL UNSTABLE patients. - LESS than or EQUAL to 0.5 ng/mL:   low likelihood for bacterial sepsis; antibiotics DISCOURAGED.  - GREATER than 0.5 ng/mL:   increased likelihood for bacterial sepsis; antibiotics ENCOURAGED.  - GREATER than 2 ng/mL:   high risk for severe sepsis / septic shock; antibiotics strongly ENCOURAGED. Decisions on antibiotic use should not be based solely on Procalcitonin (PCT) levels. If PCT is low but uncertainty exists with stopping antibiotics, repeat PCT in 6-24 hours to confirm the low level.  If antibiotics are administered (regardless if initial PCT was high or low), repeat PCT every 1-2 days to consider early antibiotic cessation (when GREATER than 80% decrease from the peak OR when PCT drops below designated cutoffs, whichever comes first), so long as the infection is NOT one that typically requires prolonged treatment durations (e.g., bone/joint infections, endocarditis, Staph. aureus bacteremia). Situations of FALSE-POSITIVE Procalcitonin values:  1) Newborns < 67 hours old  2) Massive stress from severe trauma / burns, major surgery, acute pancreatitis, cardiogenic / hemorrhagic shock, sickle cell crisis, or other organ perfusion abnormalities  3) Malaria and some Candidal infections  4) Treatment with agents that stimulate cytokines (e.g., OKT3, anti-lymphocyte globulins, alemtuzumab, IL-2, granulocyte transfusion [NOT GCSFs])  5) Chronic renal disease causes elevated baseline levels (consider GREATER than 0.75 ng/mL as an abnormal cut-off); initiating HD/CRRT may cause transient decreases  6) Paraneoplastic syndromes from medullary thyroid or SCLC, some forms of vasculitis, and acute [id number] disease    Situations of FALSE-NEGATIVE Procalcitonin values:  1) Too early in clinical course for PCT to have reached its peak (may repeat in 6-24 hours to confirm low level)  2) Localized infection WITHOUT systemic (SIRS / sepsis) response (e.g., an abscess, osteomyelitis, cystitis)  3) Mycobacteria (e.g., Tuberculosis, MAC)  4) Cystic fibrosis exacerbations     HS TROPONIN I 4HR - Normal    hs TnI 4hr 12  "Refer to ACS Flowchart"- see link ng/L Final    Comment:                                              Initial (time 0) result  If >=50 ng/L, Myocardial injury suggested ;  Type of myocardial injury and treatment strategy  to be determined. If 5-49 ng/L, a delta result at 2 hours and or 4 hours will be needed to further evaluate. If <4 ng/L, and chest pain has been >3 hours since onset, patient may qualify for discharge based on the HEART score in the ED. If <5 ng/L and <3hours since onset of chest pain, a delta result at 2 hours will be needed to further evaluate. HS Troponin 99th Percentile URL of a Health Population=12 ng/L with a 95% Confidence Interval of 8-18 ng/L.     Second Troponin (time 2 hours)  If calculated delta >= 20 ng/L, Myocardial injury suggested ; Type of myocardial injury and treatment strategy to be determined. If 5-49 ng/L and the calculated delta is 5-19 ng/L, consult medical service for evaluation. Continue evaluation for ischemia on ecg and other possible etiology and repeat hs troponin at 4 hours. If delta is <5 ng/L at 2 hours, consider discharge based on risk stratification via the HEART score (if in ED), or WILLIAN risk score in IP/Observation. HS Troponin 99th Percentile URL of a Health Population=12 ng/L with a 95% Confidence Interval of 8-18 ng/L. Delta 4hr hsTnI -2  <20 ng/L Final   POCT GLUCOSE - Normal    POC Glucose 83  65 - 140 mg/dl Final   POCT GLUCOSE - Normal    POC Glucose 97  65 - 140 mg/dl Final   URINE CULTURE       • Labs reviewed by me are significant for: hypoglycemia, hyperkalemia, renal failure    • Clinical decision rules/scores are significant for:     • Discussed case with:   • Considered admission for:     Treatment and Disposition  ED course: Numerous reassessments of glucose. Patient with stable glucose x2, then hypoglycemic again. Patient started on dextrose containing fluids and given another amp. Patient with acidemia. Started on bicarb drip. Patient received 2 L of fluids here. Given antibiotics for possibility of sepsis. Consulted ICU and nephrology, patient will require dialysis.   Admitted to ICU  Shared decision making:   Code status:     ED Course         Critical Care Time  CriticalCare Time    Date/Time: 6/14/2023 5:37 PM    Performed by: Susana Alcantara MD  Authorized by: Susana Alcantara MD    Critical care provider statement:     Critical care time (minutes):  75    Critical care time was exclusive of:  Separately billable procedures and treating other patients and teaching time    Critical care was necessary to treat or prevent imminent or life-threatening deterioration of the following conditions:  Renal failure and dehydration    Critical care was time spent personally by me on the following activities:  Blood draw for specimens, obtaining history from patient or surrogate, development of treatment plan with patient or surrogate, discussions with consultants, discussions with primary provider, evaluation of patient's response to treatment, examination of patient, review of old charts, re-evaluation of patient's condition, ordering and review of radiographic studies, ordering and review of laboratory studies and ordering and performing treatments and interventions        Diagnosis 1 acute renal failure, 2 hyperkalemia, 3 dehydration, 4 hypoglycemia

## 2023-06-14 NOTE — ED PROVIDER NOTES
History  Chief Complaint   Patient presents with   • Abdominal Pain     Pt reports generalized abdominal pain, weakness x2 days, today pt started with nausea; denies fevers     Pt is an 80 y/o M extensive pmhx presenting for evaluation of generalized weakness x2 days. Pt reports that two days ago he lost his appetite and since that time has had decreased energy, generalized weakness and feels unsteady on his feet. Pt's daughter at bedside provides additional hx states that pt is normally more energetic, she feels that his voice is weaker and that he is more forgetful the last few days. Pt endorses some generalized abd pain described as discomfort with associated nausea. No chest pain sob v/d numbness focal weakness HA or any other complaints at this time. Prior to Admission Medications   Prescriptions Last Dose Informant Patient Reported? Taking? Acetaminophen (TYLENOL EXTRA STRENGTH PO)  Self Yes No   Sig: Take 500 mg by mouth if needed (for pain as needed). Indications: pain as needed   B Complex-C (SUPER B COMPLEX PO)  Self Yes No   Sig: Take 1 capsule by mouth daily    Cholecalciferol 25 MCG (1000 UT) capsule  Self Yes No   Sig: Take 3,000 Units by mouth daily. Indications: Vitamin D Deficiency   Diclofenac Sodium (VOLTAREN) 1 %  Self No No   Sig: Apply 2 g topically 4 (four) times a day   Iron Combinations (NIFEREX) TABS  Self Yes No   Sig: Take 1 tablet by mouth in the morning   Multiple Vitamin (MULTIVITAMINS PO)  Self Yes No   Sig: Take 1 tablet by mouth daily   Potassium Chloride ER 20 MEQ TBCR  Self Yes No   Sig: Take 20 mEq by mouth in the morning.  Indications: Low Amount of Potassium in the Blood   atorvastatin (LIPITOR) 40 mg tablet  Self Yes No   Sig: Take 40 mg by mouth daily after dinner Atorvastatin Calcium 40 MG Oral Tablet Take 1 tablet daily  Refills: 0  Active    fluticasone (FLONASE) 50 mcg/act nasal spray  Self Yes No   Si sprays into each nostril daily as needed Shake liquid and spray   gabapentin (NEURONTIN) 100 mg capsule  Self Yes No   Sig: Take 100 mg by mouth daily   lubiprostone (AMITIZA) 24 mcg capsule  Self Yes No   Sig: Take 24 mcg by mouth 2 (two) times a day with meals. Indications: Chronic Constipation of Unknown Cause   metFORMIN (GLUCOPHAGE) 1000 MG tablet  Self Yes No   Sig: Take 1,000 mg by mouth daily with dinner. Indications: Type 2 Diabetes   omeprazole (PriLOSEC) 20 mg delayed release capsule  Self Yes No   Sig: Omeprazole 20 MG Oral Tablet Delayed Release Take 1 tablet daily  Refills: 0  Active   polyethylene glycol (GLYCOLAX) 17 GM/SCOOP powder  Self Yes No   Sig: Take 17 g by mouth 2 (two) times a day   senna-docusate sodium (SENOKOT S) 8.6-50 mg per tablet  Self No No   Sig: Take 1 tablet by mouth daily at bedtime   sodium hypochlorite (DAKIN'S HALF-STRENGTH) external solution   No No   Sig: Apply 1 application. topically daily At each dressing change   sotalol (BETAPACE) 80 mg tablet   No No   Sig: Take 1 tablet (80 mg total) by mouth daily   torsemide 40 MG TABS   No No   Sig: Take 40 mg by mouth 2 (two) times a day   warfarin (COUMADIN) 2 mg tablet  Self No No   Sig: Take 2 tablets (4 mg total) by mouth daily Acknowledge   Patient taking differently: Take 4 mg by mouth daily. 6.7.23   Coumadin 4 mg daily. PT.INR  6.13.23.   Indications: Atrial Fibrillation      Facility-Administered Medications Last Administration Doses Remaining   lidocaine (LMX) 4 % cream 6/13/2023  3:05 PM 0          Past Medical History:   Diagnosis Date   • Anemia    • Arthritis    • Atrial fibrillation ([address])    • Basal cell carcinoma 03/22/2022    Tip of Nose   • CHF (congestive heart failure) (HCC)    • Diabetes mellitus ([address])     Niddm   • DVT (deep vein thrombosis) in pregnancy 1966    not in pregnancy   • DVT (deep venous thrombosis) ([address]) 1966   • Dyslipidemia    • Encephalopathy acute 11/4/2022   • GERD (gastroesophageal reflux disease)    • Hyperlipidemia    • Hypertension    • Hypomagnesemia 10/19/2022   • Irregular heart beat     Afib   • Morbid obesity due to excess calories ([address])     Resolved 9/2/2014    • Pulmonary embolism (HCC)    • Sepsis ([address])    • Squamous cell skin cancer 07/30/2020    Left posterior scalp   • Visual impairment        Past Surgical History:   Procedure Laterality Date   • AORTIC VALVE REPLACEMENT     • CARDIAC DEFIBRILLATOR PLACEMENT  04/2014   • CARDIAC SURGERY  02/2014    AVR   • COLONOSCOPY     • INSERT / REPLACE / REMOVE PACEMAKER     • IR ASPIRATION BAKERS CYST  3/8/2023   • IR CHOLECYSTOSTOMY TUBE CHECK/CHANGE/REPOSITION/REINSERTION/UPSIZE  10/13/2022   • IR CHOLECYSTOSTOMY TUBE CHECK/CHANGE/REPOSITION/REINSERTION/UPSIZE  10/19/2022   • IR CHOLECYSTOSTOMY TUBE CHECK/CHANGE/REPOSITION/REINSERTION/UPSIZE  10/27/2022   • IR CHOLECYSTOSTOMY TUBE CHECK/CHANGE/REPOSITION/REINSERTION/UPSIZE  11/7/2022   • IR CHOLECYSTOSTOMY TUBE CHECK/CHANGE/REPOSITION/REINSERTION/UPSIZE  11/10/2022   • IR CHOLECYSTOSTOMY TUBE CHECK/CHANGE/REPOSITION/REINSERTION/UPSIZE  12/1/2022   • IR CHOLECYSTOSTOMY TUBE CHECK/CHANGE/REPOSITION/REINSERTION/UPSIZE  1/6/2023   • IR CHOLECYSTOSTOMY TUBE CHECK/CHANGE/REPOSITION/REINSERTION/UPSIZE  1/24/2023   • IR CHOLECYSTOSTOMY TUBE CHECK/CHANGE/REPOSITION/REINSERTION/UPSIZE  3/15/2023   • IR CHOLECYSTOSTOMY TUBE PLACEMENT  9/1/2022   • IR DRAINAGE TUBE CHECK AND/OR REMOVAL  3/22/2023   • IR DRAINAGE TUBE CHECK AND/OR REMOVAL  4/10/2023   • IR DRAINAGE TUBE PLACEMENT  4/6/2023   • JOINT REPLACEMENT Left     LTKR   • MOHS SURGERY  07/30/2020    Left posterior scalp, Dr. Rio Kilgore   • MOHS SURGERY  04/18/2022    BCC Tip of Nose- Dr. Rio Kilgore   • Thamas Pump DIVJ&STRIP LONG SAPH Sterling Payor Right 8/17/2018    Procedure: LEG PERFORATED INJECTION SCLEROTHERAPY;  Surgeon: Eliza Barnett MD;  Location: [facility];  Service: Vascular   • REPLACEMENT TOTAL KNEE Right    • TOTAL KNEE ARTHROPLASTY Left    • VASCULAR SURGERY     • VENA CAVA FILTER PLACEMENT      Interruption inferior vena cava, Josue filter, placement   • WISDOM TOOTH EXTRACTION         Family History   Problem Relation Age of Onset   • Arthritis Mother    • Stroke Mother    • Arthritis Father    • Cancer Father    • Arthritis Daughter      I have reviewed and agree with the history as documented. E-Cigarette/Vaping   • E-Cigarette Use Never User      E-Cigarette/Vaping Substances   • Nicotine No    • THC No    • CBD No    • Other No    • Unknown No      Social History     Tobacco Use   • Smoking status: Never   • Smokeless tobacco: Never   Vaping Use   • Vaping Use: Never used   Substance Use Topics   • Alcohol use: Not Currently     Comment: no alcohol in 28 yrs   • Drug use: Never        Review of Systems   Constitutional: Positive for appetite change and fatigue. Negative for chills and fever. HENT: Negative for congestion. Respiratory: Negative for cough and shortness of breath. Cardiovascular: Negative for chest pain. Gastrointestinal: Positive for abdominal pain and nausea. Negative for blood in stool, diarrhea and vomiting. Genitourinary: Positive for decreased urine volume. Negative for dysuria and frequency. Musculoskeletal: Positive for back pain (chronic). Skin: Positive for wound (chronic). Neurological: Positive for weakness. Negative for numbness and headaches. All other systems reviewed and are negative.       Physical Exam  ED Triage Vitals   Temperature Pulse Respirations Blood Pressure SpO2   06/14/23 1504 06/14/23 1504 06/14/23 1456 06/14/23 1504 06/14/23 1504   97.9 °F (36.6 °C) 68 20 106/61 98 %      Temp Source Heart Rate Source Patient Position - Orthostatic VS BP Location FiO2 (%)   06/14/23 1504 06/14/23 1456 06/14/23 1504 06/14/23 1504 --   Tympanic Monitor Sitting Left arm       Pain Score       06/14/23 1456       5             Orthostatic Vital Signs  Vitals:    06/14/23 1900 06/14/23 1915 06/14/23 1930 06/14/23 2000   BP: 108/57 105/57 103/56   Pulse: 64 62 60 (!) 54   Patient Position - Orthostatic VS: Lying Lying  Lying       Physical Exam  Vitals and nursing note reviewed. Constitutional:       General: He is awake. He is not in acute distress. Appearance: He is obese. He is ill-appearing. He is not toxic-appearing or diaphoretic. HENT:      Head: Normocephalic and atraumatic. Mouth/Throat:      Mouth: Mucous membranes are moist.   Eyes:      Extraocular Movements: Extraocular movements intact. Cardiovascular:      Rate and Rhythm: Normal rate and regular rhythm. Pulmonary:      Effort: Pulmonary effort is normal. No respiratory distress. Breath sounds: Normal breath sounds. Abdominal:      Palpations: Abdomen is soft. Tenderness: There is abdominal tenderness in the right upper quadrant, epigastric area and left upper quadrant. There is no guarding or rebound. Musculoskeletal:         General: Normal range of motion. Cervical back: Normal range of motion. Right lower le+ Edema present. Left lower le+ Edema present. Legs:    Skin:     General: Skin is warm and dry. Neurological:      General: No focal deficit present. Mental Status: He is alert and oriented to person, place, and time.          ED Medications  Medications   vancomycin (VANCOCIN) 1500 mg in sodium chloride 0.9% 250 mL IVPB (has no administration in time range)   sodium bicarbonate 150 mEq in dextrose 5 % 1,000 mL infusion (has no administration in time range)   sodium chloride 0.9 % bolus 1,000 mL (1,000 mL Intravenous New Bag 23)   calcium gluconate 1 g in sodium chloride 0.9% 50 mL (premix) (0 g Intravenous Stopped 23)   albuterol inhalation solution 5 mg (5 mg Nebulization Given 23)   dextrose 50 % IV solution 25 mL (50 mL Intravenous Given 23)   insulin regular (HumuLIN R,NovoLIN R) injection 10 Units (10 Units Intravenous Given 23 180)   cefepime (MAXIPIME) 1,000 mg in dextrose 5 % 50 mL IVPB (1,000 mg Intravenous New Bag 6/14/23 1923)   HYDROmorphone (DILAUDID) injection 0.5 mg (0.5 mg Intravenous Given 6/14/23 1854)   dextrose 50 % IV solution 25 mL (25 mL Intravenous Given 6/14/23 1857)   dextrose 50 % IV solution 25 mL (25 mL Intravenous Given 6/14/23 2016)       Diagnostic Studies  Results Reviewed     Procedure Component Value Units Date/Time    Basic metabolic panel [[id number]] Collected: 06/14/23 2024    Lab Status: In process Specimen: Blood from Arm, Right Updated: 06/14/23 2031    Protime-INR [[id number]]     Lab Status: No result Specimen: Blood     APTT [[id number]]     Lab Status: No result Specimen: Blood     HS Troponin I 4hr [[id number]] Collected: 06/14/23 2019    Lab Status: In process Specimen: Blood from Arm, Right Updated: 06/14/23 2023    Lactic acid 2 Hours [[id number]]  (Abnormal) Collected: 06/14/23 1926    Lab Status: Final result Specimen: Blood from Arm, Left Updated: 06/14/23 2018     LACTIC ACID 9.4 mmol/L     Narrative:      Result may be elevated if tourniquet was used during collection.     Basic metabolic panel [[id number]]  (Abnormal) Collected: 06/14/23 1851    Lab Status: Final result Specimen: Blood from Arm, Left Updated: 06/14/23 2016     Sodium 129 mmol/L      Potassium 8.1 mmol/L      Chloride 101 mmol/L      CO2 10 mmol/L      ANION GAP 18 mmol/L      BUN 79 mg/dL      Creatinine 8.58 mg/dL      Glucose 21 mg/dL      Calcium 9.4 mg/dL      eGFR 5 ml/min/1.73sq m     Narrative:      [facility] guidelines for Chronic Kidney Disease (CKD):   •  Stage 1 with normal or high GFR (GFR > 90 mL/min/1.73 square meters)  •  Stage 2 Mild CKD (GFR = 60-89 mL/min/1.73 square meters)  •  Stage 3A Moderate CKD (GFR = 45-59 mL/min/1.73 square meters)  •  Stage 3B Moderate CKD (GFR = 30-44 mL/min/1.73 square meters)  •  Stage 4 Severe CKD (GFR = 15-29 mL/min/1.73 square meters)  •  Stage 5 End Stage CKD (GFR <15 mL/min/1.73 square meters)  Note: GFR calculation is accurate only with a steady state creatinine    Fingerstick Glucose (POCT) [[id number]]  (Abnormal) Collected: 06/14/23 2005    Lab Status: Final result Updated: 06/14/23 2006     POC Glucose 47 mg/dl     Protime-INR [[id number]]  (Abnormal) Collected: 06/14/23 1851    Lab Status: Final result Specimen: Blood from Arm, Left Updated: 06/14/23 1959     Protime 109.8 seconds      INR >15.00    APTT [[id number]]  (Abnormal) Collected: 06/14/23 1851    Lab Status: Final result Specimen: Blood from Arm, Left Updated: 06/14/23 1959     PTT 77 seconds     HS Troponin I 2hr [[id number]]  (Normal) Collected: 06/14/23 1905    Lab Status: Final result Specimen: Blood from Arm, Left Updated: 06/14/23 1953     hs TnI 2hr 16 ng/L      Delta 2hr hsTnI 2 ng/L     Procalcitonin [[id number]]  (Normal) Collected: 06/14/23 1851    Lab Status: Final result Specimen: Blood from Arm, Left Updated: 06/14/23 1953     Procalcitonin 0.25 ng/ml     Urine Microscopic [[id number]]  (Abnormal) Collected: 06/14/23 1905    Lab Status: Final result Specimen: Urine, Straight Cath Updated: 06/14/23 1927     RBC, UA 10-20 /hpf      WBC, UA Innumerable /hpf      Epithelial Cells Occasional /hpf      Bacteria, UA Moderate /hpf      Hyaline Casts, UA 0-3 /lpf      WBC Clumps Present     Transitional Epithelial Cells Present    Urine culture [[id number]] Collected: 06/14/23 1905    Lab Status:  In process Specimen: Urine, Straight Cath Updated: 06/14/23 1927    Fingerstick Glucose (POCT) [[id number]]  (Normal) Collected: 06/14/23 1924    Lab Status: Final result Updated: 06/14/23 1926     POC Glucose 97 mg/dl     UA w Reflex to Microscopic w Reflex to Culture [[id number]]  (Abnormal) Collected: 06/14/23 1905    Lab Status: Final result Specimen: Urine, Straight Cath Updated: 06/14/23 1919     Color, UA Yellow     Clarity, UA Extra Turbid     Specific Gravity, UA 1.023     pH, UA 6.0     Leukocytes, UA Large     Nitrite, UA Negative Protein,  (3+) mg/dl      Glucose, UA Trace mg/dl      Ketones, UA Negative mg/dl      Urobilinogen, UA <2.0 mg/dl      Bilirubin, UA Negative     Occult Blood, UA Moderate    Blood culture #2 [[id number]] Collected: 06/14/23 1851    Lab Status: In process Specimen: Blood from Arm, Left Updated: 06/14/23 1905    Blood culture #1 [[id number]] Collected: 06/14/23 1900    Lab Status: In process Specimen: Blood from Arm, Left Updated: 06/14/23 1905    Fingerstick Glucose (POCT) [[id number]]  (Abnormal) Collected: 06/14/23 1843    Lab Status: Final result Updated: 06/14/23 1844     POC Glucose 57 mg/dl     Lactic acid, plasma (w/reflex if result > 2.0) [[id number]]  (Abnormal) Collected: 06/14/23 1728    Lab Status: Final result Specimen: Blood from Arm, Left Updated: 06/14/23 1833     LACTIC ACID 9.7 mmol/L     Narrative:      Result may be elevated if tourniquet was used during collection.     Blood gas, venous [[id number]]  (Abnormal) Collected: 06/14/23 1815    Lab Status: Final result Specimen: Blood from Arm, Left Updated: 06/14/23 1828     pH, Pete 7.142     pCO2, Pete 25.7 mm Hg      pO2, Pete 128.3 mm Hg      HCO3, Pete 8.6 mmol/L      Base Excess, Pete -18.8 mmol/L      O2 Content, Pete 13.4 ml/dL      O2 HGB, VENOUS 94.6 %     Fingerstick Glucose (POCT) [[id number]]  (Normal) Collected: 06/14/23 1807    Lab Status: Final result Updated: 06/14/23 1808     POC Glucose 83 mg/dl     HS Troponin 0hr (reflex protocol) [[id number]]  (Normal) Collected: 06/14/23 1728    Lab Status: Final result Specimen: Blood from Arm, Left Updated: 06/14/23 1805     hs TnI 0hr 14 ng/L     Magnesium [[id number]]  (Normal) Collected: 06/14/23 1520    Lab Status: Final result Specimen: Blood from Arm, Right Updated: 06/14/23 1722     Magnesium 2.1 mg/dL     Comprehensive metabolic panel [[id number]]  (Abnormal) Collected: 06/14/23 1520    Lab Status: Final result Specimen: Blood from Arm, Right Updated: 06/14/23 1632     Sodium 130 mmol/L Potassium 7.4 mmol/L      Chloride 102 mmol/L      CO2 12 mmol/L      ANION GAP 16 mmol/L      BUN 81 mg/dL      Creatinine 8.70 mg/dL      Glucose 43 mg/dL      Calcium 9.2 mg/dL      Corrected Calcium 9.9 mg/dL      AST 28 U/L      ALT 19 U/L      Alkaline Phosphatase 105 U/L      Total Protein 8.1 g/dL      Albumin 3.1 g/dL      Total Bilirubin 1.06 mg/dL      eGFR 4 ml/min/1.73sq m     Narrative:      [employer] guidelines for Chronic Kidney Disease (CKD):   •  Stage 1 with normal or high GFR (GFR > 90 mL/min/1.73 square meters)  •  Stage 2 Mild CKD (GFR = 60-89 mL/min/1.73 square meters)  •  Stage 3A Moderate CKD (GFR = 45-59 mL/min/1.73 square meters)  •  Stage 3B Moderate CKD (GFR = 30-44 mL/min/1.73 square meters)  •  Stage 4 Severe CKD (GFR = 15-29 mL/min/1.73 square meters)  •  Stage 5 End Stage CKD (GFR <15 mL/min/1.73 square meters)  Note: GFR calculation is accurate only with a steady state creatinine    Lipase [[id number]]  (Normal) Collected: 06/14/23 1520    Lab Status: Final result Specimen: Blood from Arm, Right Updated: 06/14/23 1559     Lipase 118 u/L     CBC and differential [[id number]]  (Abnormal) Collected: 06/14/23 1520    Lab Status: Final result Specimen: Blood from Arm, Right Updated: 06/14/23 1527     WBC 6.08 Thousand/uL      RBC 3.21 Million/uL      Hemoglobin 9.4 g/dL      Hematocrit 31.7 %      MCV 99 fL      MCH 29.3 pg      MCHC 29.7 g/dL      RDW 19.1 %      MPV 9.2 fL      Platelets 698 Thousands/uL      nRBC 0 /100 WBCs      Neutrophils Relative 66 %      Immat GRANS % 2 %      Lymphocytes Relative 22 %      Monocytes Relative 10 %      Eosinophils Relative 0 %      Basophils Relative 0 %      Neutrophils Absolute 3.97 Thousands/µL      Immature Grans Absolute 0.13 Thousand/uL      Lymphocytes Absolute 1.33 Thousands/µL      Monocytes Absolute 0.61 Thousand/µL      Eosinophils Absolute 0.02 Thousand/µL      Basophils Absolute 0.02 Thousands/µL XR chest 1 view portable   ED Interpretation by Bradley Fernandes DO (06/14 1835)   Wet read - pacemaker, sternotomy wires, increased perihilar markings but no acute pulm edema, effusion or consolidations      CT abdomen pelvis wo contrast    (Results Pending)         Procedures  ECG 12 Lead Documentation Only    Date/Time: 6/14/2023 5:31 PM    Performed by: Bradley Fernandes DO  Authorized by: Bradley Fernandes DO    Indications / Diagnosis:  Generalized weakness  Patient location:  ED  Previous ECG:     Previous ECG:  Compared to current    Similarity:  Changes noted  Interpretation:     Interpretation: abnormal    Rate:     ECG rate:  58    ECG rate assessment: bradycardic    Rhythm:     Rhythm: other rhythm    Ectopy:     Ectopy: none    QRS:     QRS axis:  Right    QRS intervals: Wide  Conduction:     Conduction: abnormal    ST segments:     ST segments:  Normal  T waves:     T waves: normal            ED Course  ED Course as of 06/14/23 2033 Wed Jun 14, 2023   1641 Potassium(!!): 7.4   1655 Blood Pressure: 106/61   1655 Pulse: 68   1655 Respirations: 20   1655 SpO2: 98 %   1655 Potassium(!!): 7.4   1655 BUN(!): 81   1655 Creatinine(!): 8.70   1655 Glucose, Random(!): 43   1655 Hemoglobin(!): 9.4   1727 Patient is hypoglycemic 43 amp of D50 given. Patient is also in acute renal failure with worsening of BUN/creatinine, potassium 7.4, IV access established calcium gluconate insulin and albuterol started. 1831 pH, Pete(!): 7.142   1831 CO2(!): 12   1831 Anion Gap(!): 16   1835 LACTIC ACID(!!): 9.7   1836 POC Glucose: 83   1836 hs TnI 0hr: 14   1845 POC Glucose(!): 57   1858 Call placed out to PACS for critical care    1923 The 30ml/kg fluid bolus was not given to the patient despite hypotension and/or significantly elevated lactate of = 4 and/or presence of septic shock due to: Renal Failure. The patient will be administered 1L of crystalloid fluid instead. Orders for this have been placed in Epic.  The patient may receive additional colloid or crystalloid fluids thereafter based on clinical condition. Jeff Sanabria DO     1926 POC Glucose: 97   1926 Leukocytes, UA(!): Large   1927 Nitrite, UA: Negative   1927 POCT URINE PROTEIN(!): 600 (3+)   1928 WBC, UA(!): Innumerable   1928 Bacteria, UA(!): Moderate   2004 Delta 2hr hsTnI: 2   2004 POCT INR(!!): >15.00   2011 POC Glucose(!): 47   2017 Glucose, Random(!!): 21   2017 Potassium(!!): 8.1   2022 LACTIC ACID(!!): 9.4                          Initial Sepsis Screening     Row Name 06/14/23 1845                Is the patient's history suggestive of a new or worsening infection? Yes (Proceed)  -MB        Suspected source of infection suspect infection, source unknown  -MB        Indicate SIRS criteria --        Are two or more of the above signs & symptoms of infection both present and new to the patient? No  -MB        Assess for evidence of organ dysfunction: Are any of the below criteria present within 6 hours of suspected infection and SIRS criteria that are NOT considered to be chronic conditions? Lactate >/equal 4. 0;Creatinine > 2.0 AND > 0.5 above baseline  -MB        Date of presentation of severe sepsis 06/14/23  -MB        Time of presentation of severe sepsis 1923  -MB        Date of presentation of septic shock --        Time of presentation of septic shock --        Fluid Resuscitation: --        Sepsis Note: Click "NEXT" below (NOT "close") to generate sepsis note based on above information. YES (proceed by clicking "NEXT")  -MB              User Key  (r) = Recorded By, (t) = Taken By, (c) = Cosigned By    [address] [place] [address] Name Provider Type    MATTHEW Sanabria DO Resident                          Medical Decision Making  Patient is an 71-year-old male extensive past medical history presenting to the ED for evaluation of generalized weakness abdominal pain nausea for 2 days    Initial presentation patient is extremely fatigued appearing.   First nurse labs resulted during initial assessment notable for hypoglycemia hyperkalemia and acute renal failure. IV access was established with 2 large-bore IVs, patient placed on the monitor. EKG did not show peaked T waves, wide QRS likely secondary to pacing. Calcium was given as a temporizing measure. Also received an amp of D50 for hypoglycemia. Patient also received insulin dextrose albuterol for hyperkalemia. Call was placed out to nephrology. Additional labs drawn. Patient required multiple interventions for hypoglycemia during his ED course receiving 3 Amps of D50. Patient received 2 L of IV fluids. 80 mg of Lasix ordered and bicarb drip ordered per nephrology recommendations. Patient's repeat BMP potassium was hemolyzed will redraw to assess if temporizing measures were effective. Will redose if necessary    Repeat INR was drawn to ensure accurate lab value given patient's normal LFTs. Given initial lactate of 9.7 septic work-up initiated. Antibiotics started. Additional labs drawn. See ED course for sepsis fluid exclusion. Urine resulted with UTI likely source of patient's sepsis. Patient's acidosis likely secondary to elevated lactic. Discussed with critical care attending who accepts patient for admission. Patient remained hemodynamically stable during his entire ED course. He is admitted to the medical ICU in stable condition with critical illness. Amount and/or Complexity of Data Reviewed  Labs: ordered. Decision-making details documented in ED Course. Radiology: ordered and independent interpretation performed. Risk  OTC drugs. Prescription drug management. Decision regarding hospitalization.             Disposition  Final diagnoses:   Generalized weakness   Abdominal pain   Hyperkalemia   REMA (acute kidney injury) ([address])   Hypoglycemia   UTI (urinary tract infection)   Metabolic acidosis     Time reflects when diagnosis was documented in both MDM as applicable and the Disposition within this note     Time User Action Codes Description Comment    6/14/2023  5:35 PM Tatiana Flock Add [R53.1] Generalized weakness     6/14/2023  5:35 PM Tatiana Flock Add [R10.9] Abdominal pain     6/14/2023  5:36 PM Tatiana Flock Add [E87.5] Hyperkalemia     6/14/2023  5:36 PM Allyn Fulton Add [N17.9] REMA (acute kidney injury) ([address])     6/14/2023  5:36 PM Tatiana Flock Add [E16.2] Hypoglycemia     6/14/2023  7:42 PM Tatiana Flock Add [N39.0] UTI (urinary tract infection)     6/14/2023  7:42 PM Tatiana Flock Add [Y22.12] Metabolic acidosis       ED Disposition     ED Disposition   Admit    Condition   Stable    Date/Time   Wed Jun 14, 2023  8:32 PM    Comment   Case was discussed with Dr. Beth Lindquist and the patient's admission status was agreed to be Admission Status: inpatient status to the service of Dr. Beth Lindquist . Follow-up Information    None         Patient's Medications   Discharge Prescriptions    No medications on file     No discharge procedures on file. PDMP Review     None           ED Provider  Attending physically available and evaluated Oliver Mcdonald. I managed the patient along with the ED Attending.     Electronically Signed by         Tatiana Long DO  06/15/23 5033

## 2023-06-14 NOTE — QUICK NOTE
We are contacted regarding acute kidney injury and hyperkalemia. Patient has history of chronic kidney disease with baseline creatinine 1.3-1.6. He has history of acute kidney injury in October 2022 with peak creatinine of 2.67 that was thought to be due to ATN due to contrast nephropathy. He is currently being admitted for evaluation of ongoing weakness. Lab work showing BUN 81, creatinine 8.7, 1.64 on 06/05/2023. Potassium 7.4, sodium 130, bicarbonate 12, anion gap 16, lactic acid 9.0. ABG with primary metabolic acidosis with superimposed respiratory acidosis.     Assessment  · Severe acute kidney injury  · Severe hyperkalemia due to acute kidney injury  · Anion gap metabolic acidosis due to lactic acidosis  · Sepsis of unclear etiology    Plan  · Agree with temporizing measures with IV dextrose/IV insulin  · Agree with IV calcium  · Agree with checking CT abdomen to rule out obstructive uropathy  · Place Combs catheter for accurate output assessment  · IV fluid resuscitation per sepsis protocol  · IV Lasix 80 mg x 1  · IV sodium bicarbonate (D5 W with 150 mEq of sodium bicarbonate)  · We will follow lab work to assess need for renal replacement therapy  · Check BMP every 2-4 hours

## 2023-06-15 ENCOUNTER — HOME CARE VISIT (OUTPATIENT)
Dept: HOME HEALTH SERVICES | Facility: HOME HEALTHCARE | Age: 88
End: 2023-06-15
Payer: MEDICARE

## 2023-06-15 PROBLEM — E87.20 ACIDOSIS: Status: ACTIVE | Noted: 2023-06-15

## 2023-06-15 PROBLEM — E87.1 HYPONATREMIA: Status: ACTIVE | Noted: 2023-06-15

## 2023-06-15 PROBLEM — E83.39 HYPERPHOSPHATEMIA: Status: ACTIVE | Noted: 2023-06-15

## 2023-06-15 PROBLEM — E87.5 HYPERKALEMIA: Status: ACTIVE | Noted: 2023-06-15

## 2023-06-15 NOTE — PROGRESS NOTES
Kulwinder Schwab is a 81 y.o. male who is currently ordered Vancomycin IV with management by the Pharmacy Consult service. Relevant clinical data and objective / subjective history reviewed. Vancomycin Assessment:  Indication and Goal AUC/Trough: Urinary tract infection (goal -600, trough >10)  Clinical Status:   Micro:   pending  Renal Function:  SCr: 6.06 mg/dL  CrCl: 12.4 mL/min  Renal replacement: CVVH D  Days of Therapy: 1  Current Dose: 1500mg load followed by 1000mg q12 (pt on cvvhd)  Vancomycin Plan:  New Dosing:    Estimated AUC: n/a    Estimated Trough: goal 10-20 mcg/mL  Next Level: 0600 06/16/23   Renal Function Monitoring: Daily BMP and UOP  Pharmacy will continue to follow closely for s/sx of nephrotoxicity, infusion reactions and appropriateness of therapy. BMP and CBC will be ordered per protocol. We will continue to follow the patient’s culture results and clinical progress daily.     Precious Piña, Pharmacist

## 2023-06-15 NOTE — PROGRESS NOTES
Procedure Note - Nephrology   Gato Quijano 81 y.o. male MRN: [id number]  [facility] En[facility]: [id number]      Assessment / Plan:  1. Oligoanuric acute kidney injury complicated by severe hyperkalemia and metabolic acidosis  -REMA likely due to septic ATN from urinary source plus or minus diuretic use plus or minus contrast associated nephropathy  - Status post IV dye with CT abdomen and pelvis on June 7  - Patient status post emergent hemodialysis therapy June 14, 2023 for hyperkalemia  - CRRT initiated, patient on 4K/3 calcium bath with even UF as patient hemodynamically unstable on pressors  - Continue to monitor serial labs  - Continue to monitor for signs of renal recovery  - Combs in place  - CK level normal  - Baseline serum creatinine previously 1.3-1.6    2. Severe hyperkalemia in the setting of severe REMA- resolved status post emergent HD treatment as well as CRRT initiation. Potassium remains in the fives. Continue 4K bath on CRRT. If potassium rising, would change over to 2K bath on CRRT. Monitor BMP. 3.  Anion gap acidosis in the setting of lactic acidosis- anion gap 16, serum bicarbonate 17, continue to correct with bicarbonate drip as well as CRRT. Would discontinue bicarbonate drip once serum bicarbonate above 18. Lactic acid remains elevated at 7.8, monitor this serially, avoid UF in the setting of lactic acidosis. 4.  Hyponatremia-likely dilutional in the setting of REMA and decreased free water excretion, continue to monitor serial labs, on bicarbonate drip-monitor volume status closely. Currently NPO.    5.  Hyperphosphatemia-Phos 7.7, improving on CRRT, monitor this    6. Hypomagnesemia- replete magnesium and monitor serial mag levels on CRRT    7. 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   Hypotension in the setting of septic shock-continue pressors per ICU team, maintain MAP greater than 65 for renal perfusion    9. Sepsis likely due to UTI-on antibiotics per ICU team, CT does not show acute abdominal or pelvic pathology, small abdominal and pelvic ascites noted with unchanged bilateral perinephric and periureteral fat stranding as well as small to moderate right pleural effusion    10. History of diastolic CHF-monitor volume status closely on bicarbonate drip, diuretics on hold in light of REMA, monitor daily weight    Subjective:   Patient seen and examined by me on CRRT at approximately 9:30 AM.  Patient remains on vasopressin and Levophed drips. He is also on insulin drip and bicarbonate drip. He denies chest pain or shortness of breath. No other complaints. Objective:     Vitals: Blood pressure (!) 119/42, pulse 60, temperature (!) 96.8 °F (36 °C), resp. rate 15, height 6' 1" (1.854 m), weight (!) 137 kg (303 lb), SpO2 100 %. ,Body mass index is 39.98 kg/m². Temp (24hrs), Av.9 °F (36.1 °C), Min:96.1 °F (35.6 °C), Max:97.9 °F (36.6 °C)      Weight (last 2 days)     Date/Time Weight    06/15/23 1034 137 (303)    06/15/23 0549 138 (303.79)            Intake/Output Summary (Last 24 hours) at 6/15/2023 1148  Last data filed at 6/15/2023 1000  Gross per 24 hour   Intake 4221.6 ml   Output 1958 ml   Net 2263.6 ml     I/O last 24 hours: In: 4221.6 [I.V.:2598.4; IV Piggyback:1623.2]  Out:  [Urine:15; PHZGK:7180]        Physical Exam:   Physical Exam  Vitals reviewed. Constitutional:       General: He is not in acute distress. Appearance: He is well-developed. He is ill-appearing. He is not diaphoretic. HENT:      Head: Normocephalic and atraumatic. Nose: Nose normal.      Mouth/Throat:      Mouth: Mucous membranes are moist.      Pharynx: No oropharyngeal exudate. Eyes:      General: No scleral icterus. Right eye: No discharge. Left eye: No discharge.       Comments: eyeglasses   Neck:      Thyroid: No thyromegaly. Cardiovascular:      Rate and Rhythm: Normal rate and regular rhythm. Heart sounds: Normal heart sounds. Pulmonary:      Effort: Pulmonary effort is normal.      Breath sounds: Normal breath sounds. No wheezing or rales. Abdominal:      General: Bowel sounds are normal. There is no distension. Palpations: Abdomen is soft. Tenderness: There is no abdominal tenderness. Genitourinary:     Comments: alfred  Musculoskeletal:         General: Swelling (b/l lymphedema) present. Normal range of motion. Cervical back: Neck supple. Lymphadenopathy:      Cervical: No cervical adenopathy. Skin:     General: Skin is warm and dry. Coloration: Skin is not pale. Findings: No rash. Neurological:      General: No focal deficit present. Mental Status: He is alert.       Comments: awake   Psychiatric:         Mood and Affect: Mood normal.         Behavior: Behavior normal.         Invasive Devices     Central Venous Catheter Line  Duration           CVC Central Lines 06/14/23  <1 day          Peripheral Intravenous Line  Duration           Peripheral IV 06/14/23 Left Antecubital <1 day    Peripheral IV 06/14/23 Right;Ventral (anterior) Forearm <1 day          Arterial Line  Duration           Arterial Line 06/14/23 Radial <1 day          Hemodialysis Catheter  Duration           HD Temporary Double Catheter 1 day          Drain  Duration           Urethral Catheter Temperature probe 16 Fr. <1 day                Medications:    Scheduled Meds:  Current Facility-Administered Medications   Medication Dose Route Frequency Provider Last Rate   • acetaminophen  488 mg Oral Q6H PRN Preston Bajwa MD     • cefepime  2,000 mg Intravenous Q12H Danya Pereyra PA-C     • chlorhexidine  15 mL Mouth/Throat Q12H [address], MD     • cholecalciferol  1,000 Units Oral Daily Preston Bajwa MD     • lidocaine  1 patch Topical Daily Alexey Carolina MD     • magnesium sulfate  4 g Intravenous Once Kerrie Nunn PA-C 4 g (06/15/23 0800)   • multi-electrolyte  500 mL Intravenous Once Grey Bautista MD     • norepinephrine  1-30 mcg/min Intravenous Titrated Sowmya Morgan MD 15 mcg/min (06/15/23 0915)   • NxStage K 4/Ca 3  20,000 mL Dialysis Continuous Heather Lane MD     • ondansetron  4 mg Intravenous Q6H PRN Brandon Manzo PA-C     • pantoprazole  20 mg Oral Early Morning Alexey Carolina MD     • sodium bicarbonate 150 mEq in dextrose 5 % 1,000 mL infusion  150 mL/hr Intravenous Continuous Vinayak Cui  mL/hr (06/15/23 0526)   • vancomycin  10 mg/kg (Adjusted) Intravenous Q12H Alexey Carolina MD 1,000 mg (06/15/23 0900)   • vasopressin  0.04 Units/min Intravenous Continuous Sowmya Morgan MD 0.04 Units/min (06/15/23 0800)     Facility-Administered Medications Ordered in Other Encounters   Medication Dose Route Frequency Provider Last Rate   • cefTRIAXone  2,000 mg Intravenous Once Matt Mittal MD     • sodium chloride  75 mL/hr Intravenous Continuous Matt Mittal MD Stopped (05/26/23 1426)       PRN Meds:.•  acetaminophen  •  ondansetron    Continuous Infusions:norepinephrine, 1-30 mcg/min, Last Rate: 15 mcg/min (06/15/23 0915)  NxStage K 4/Ca 3, 20,000 mL  sodium bicarbonate 150 mEq in dextrose 5 % 1,000 mL infusion, 150 mL/hr, Last Rate: 150 mL/hr (06/15/23 0526)  vasopressin, 0.04 Units/min, Last Rate: 0.04 Units/min (06/15/23 0800)            LAB RESULTS:      Results from last 7 days   Lab Units 06/15/23  0526 06/15/23  0503 06/15/23  0233 06/15/23  0032 06/14/23  2249 06/14/23  2235 06/14/23  2024 06/14/23  1851 06/14/23  1520   ALK PHOS U/L  --   --   --   --   --  92  --   --  105   ALT U/L  --   --   --   --   --  20  --   --  19   AST U/L  --   --   --   --   --  33  --   --  28   BUN mg/dL  --  54* 60* 63*  --  80* 51* 79* 81*   CALCIUM mg/dL  --  8.8 8.9 9.2  --  9.5 5.4* 9.4 9.2   CHLORIDE mmol/L  --  100 98 100  --  104 125* 101 102   CO2 mmol/L  --  17* 15* 15*  --  11* 6* 10* 12*   CO2, I-STAT mmol/L  --   --   --   --  12*  --   --   --   --    CREATININE mg/dL  --  5.55* 6.06* 6.49*  --  8.19* 4.97* 8.58* 8.70*   EOS PCT % 1  --  0  --   --  0  --   --  0   GLUCOSE, ISTAT mg/dl  --   --   --   --  91  --   --   --   --    HEMATOCRIT % 26.0*  --  26.3*  --   --  27.6*  --   --  31.7*   HEMATOCRIT, ISTAT %  --   --   --   --  26*  --   --   --   --    HEMOGLOBIN g/dL 7.8*  --  8.2*  --   --  8.4*  --   --  9.4*   I STAT HEMOGLOBIN g/dl  --   --   --   --  8.8*  --   --   --   --    POTASSIUM mmol/L  --  5.2 5.3 5.4*  --  6.9* 4.1 8.1* 7.4*   LYMPHS PCT % 8*  --  10*  --   --  19  --   --  22   MAGNESIUM mg/dL  --  1.5* 1.4* 1.8  --  1.9  --   --  2.1   MONOS PCT % 8  --  7  --   --  12  --   --  10   NEUTROS PCT % 82*  --  82*  --   --  67  --   --  66   PHOSPHORUS mg/dL  --  5.1* 5.8* 6.2*  --  7.7*  --   --   --    PLATELETS Thousands/uL 180  --  203  --   --  266  --   --  274   WBC Thousand/uL 11.24*  --  9.19  --   --  6.14  --   --  6.08       CUTURES:  Lab Results   Component Value Date    BLOODCX Received in Microbiology Lab. Culture in Progress. 06/14/2023    BLOODCX Received in Microbiology Lab. Culture in Progress. 06/14/2023    BLOODCX No Growth After 5 Days. 04/04/2023    BLOODCX No Growth After 5 Days. 04/04/2023    BLOODCX No Growth After 5 Days. 11/04/2022    BLOODCX No Growth After 5 Days. 11/04/2022    BLOODCX No Growth After 5 Days. 10/21/2022    BLOODCX No Growth After 5 Days. 10/21/2022    URINECX No Growth <1000 cfu/mL 07/09/2019    URINECX >100,000 cfu/ml Escherichia coli 05/19/2017    URINECX No Growth <1000 cfu/mL 04/20/2017    URINECX 8182-6148 cfu/ml Gram Negative Miquel 04/02/2017                 Portions of the record may have been created with voice recognition software.  Occasional wrong word or "sound a like" substitutions may have occurred due to the inherent limitations of voice recognition software. Read the chart carefully and recognize, using context, where substitutions have occurred. If you have any questions, please contact the dictating provider.

## 2023-06-15 NOTE — WOUND OSTOMY CARE
Consult Note - Wound   Melvina Nix 81 y.o. male MRN: [id number]  Unit/Bed#: [facility] 13 Encounter: [id number]        History and Present Illness:  Patient is 82 yo male admitted to [facility] with REMA. Patient has history of CHF, DM2, and a-fib. Patient is incontinent of bowel and bladder. Patient is dependent for all care, seen today in MICU on critical care low air loss mattress, on pressors. Patient is currently NPO. Assessment Findings:   B/L heels intact and blanching, preventative skin care orders placed. 1. POA stage II sacrum- small partial thickness wound with pink tissue, periwound skin is intact, no drainage present. 2. Venous ulcer right lower leg- wound is full thickness with mostly adhered, yellow slough tissue, and beefy red granular tissue, large amount of foul-smelling serosanguineous drainage present, Periwound skin is edematous and flaky. Recommend patient follow up at wound management center upon discharge. No induration, fluctuance, odor, warmth/temperature differences, redness, or purulence noted to the above noted wounds and skin areas assessed. New dressings applied per orders listed below. Patient tolerated well- no s/s of non-verbal pain or discomfort observed during the encounter. Bedside nurse aware of plan of care. See flow sheets for more detailed assessment findings. Wound care will continue to follow. Skin care plans:  1-Hydraguard to bilateral heels BID and PRN  2-Elevate heels to offload pressure. 3-Ehob cushion in chair when out of bed. 4-Moisturize skin daily with skin nourishing cream.  5-Turn/reposition q2h or when medically stable for pressure re-distribution on skin. 6-Calazime paste to sacrum/buttocks BID and PRN   7-Cleanse right leg with normal saline, apply silver alginate to wound bed, cover with ABD, secure with juanjo and tape. Change daily and PRN soilage/displacement.        Wounds:    Sacral wound could not be photographed at this time due to patient's pain with turning. Wound 01/13/23 Ankle Right;Medial (Active)   Wound Image   06/15/23 1056   Wound Description Beefy red;Slough 06/15/23 1056   Deedee-wound Assessment Scaly;Edema 06/15/23 1056   Wound Length (cm) 14 cm 06/15/23 1056   Wound Width (cm) 4 cm 06/15/23 1056   Wound Depth (cm) 0.2 cm 06/15/23 1056   Wound Surface Area (cm^2) 56 cm^2 06/15/23 1056   Wound Volume (cm^3) 11.2 cm^3 06/15/23 1056   Calculated Wound Volume (cm^3) 11.2 cm^3 06/15/23 1056   Change in Wound Size % -413.76 06/15/23 1056   Tunneling 0 cm 06/15/23 1056   Tunneling in depth located at 0 06/15/23 1056   Undermining 0 06/15/23 1056   Undermining is depth extending from 0 06/15/23 1056   Wound Site Closure JIM 06/15/23 1056   Drainage Amount Large 06/15/23 1056   Drainage Description Foul smelling;Serosanguineous 06/15/23 1056   Non-staged Wound Description Full thickness 06/15/23 1056   Treatments Cleansed 06/15/23 1056   Dressing ABD;Calcium Alginate with Silver;Dry dressing 06/15/23 1056   Wound packed? No 06/15/23 1056   Packing- # removed 0 06/15/23 1056   Packing- # inserted 0 06/15/23 1056   Dressing Changed New 06/15/23 1056   Patient Tolerance Tolerated well 06/15/23 1056   Dressing Status Clean;Dry; Intact 06/15/23 1056           Kaley VELASCON, RN, [employer]

## 2023-06-15 NOTE — CONSULTS
This is a nonbillable consult. Electrophysiology was asked to interrogate patient's device that showed no therapies or arrhythmias besides nonsustained ventricular tachycardia that was 3 beats along earlier this month unrelated to current issue. [employer] report was sent to [facility] fax machine.

## 2023-06-15 NOTE — PROCEDURES
Arterial Line Insertion    Date/Time: 6/14/2023 10:30 PM    Performed by: Remy Rodriguez PA-C  Authorized by: Remy Rodriguez PA-C    Patient location:  Bedside  Other Assisting Provider: Yes (comment) Jon Schneider PA-C)    Consent:     Consent obtained:  Emergent situation    Consent given by:  Patient  Indications:     Indications: hemodynamic monitoring and multiple ABGs    Pre-procedure details:     Skin preparation:  Chlorhexidine    Preparation: Patient was prepped and draped in sterile fashion    Anesthesia (see MAR for exact dosages): Anesthesia method:  Local infiltration    Local anesthetic:  Lidocaine 1% w/o epi  Procedure details:     Location / Tip of Catheter:  Radial    Laterality:  Left    Needle gauge:  20 G    Placement technique:  Percutaneous    Number of attempts:  2    Successful placement: yes      Transducer: waveform confirmed    Post-procedure details:     Post-procedure:  Sterile dressing applied and sutured    CMS:  Unchanged    Patient tolerance of procedure:   Tolerated well, no immediate complications

## 2023-06-15 NOTE — PROGRESS NOTES
Daily Progress Note - Critical Care   Mora Perez 81 y.o. male MRN: [id number]  Unit/Bed#: MICU 13 Encounter: [id number]        ----------------------------------------------------------------------------------------  HPI/24hr events:   82 yo M w/ hx of CHF, CKD, DM2 presents to ED w/ 2 days fatigue and feeling unwell. Workup significant for septic shock due to UTI, hyperkalemia w/ ECG changes, acute renal failure, AGMA, and hypoglycemia. Admitted to ICU and started on dialysis and pressors for worsening clinical status.      ---------------------------------------------------------------------------------------  SUBJECTIVE  Back pain    Review of Systems   Constitutional: Negative for chills and fever. HENT: Negative for ear pain and sore throat. Eyes: Negative for pain and visual disturbance. Respiratory: Negative for cough and shortness of breath. Cardiovascular: Negative for chest pain and palpitations. Gastrointestinal: Negative for abdominal pain and vomiting. Genitourinary: Negative for dysuria and hematuria. Musculoskeletal: Positive for back pain. Negative for arthralgias. Skin: Negative for color change and rash. Neurological: Negative for seizures and syncope. All other systems reviewed and are negative. Review of systems was reviewed and negative unless stated above in HPI/24-hour events   ---------------------------------------------------------------------------------------  Assessment and Plan:    Neuro:   • Diagnosis: encephalopathy, resolved, likely due to bradycardia, hypoglycemia  ? Plan: Q4H neuro check  • Diagnosis: analgesia  ? Plan: PRN tylenol, lidocaine patch        CV:   • Diagnosis: septic shock, UTI  ? Plan: norepinephrine gtt PRN to maintain MAP > 65  • Diagnosis: history of diastolic CHF EF 67% [id number]  ? Plan: echo  ? Hold home torsemide  • Diagnosis: history of PAF  ?  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 Plan: EP consult to interrogate ICD  • Diagnosis: HLD  ? Plan: hold home statin  • Diagnosis: hypothermia in setting of septic shock  ? Plan: caitlin hugger        Pulm:  • Diagnosis: R pleural effusion, chronic  ? Plan: supplemental O2 PRN to maintain SpO2 > 94%        GI:   • Diagnosis: nausea, resolved  ? Plan: ondansetron PRN  • Diagnosis: history of GERD  ? Plan: continue home PPI        :   • Diagnosis: REMA, history of CKD3  ? Plan: strict I/Os  ? CVVH, even  ? Nephrology consult  ? Q6H BMP, Mg, Phos  • Diagnosis: AGMA  ? Plan: bicarbonate gtt  ? Trend LA  • Diagnosis: hyperkalemia, resolved  ? Plan: repeat AM ECG  ? CVVH  • Diagnosis: BPH  ? Plan: alfred in place        F/E/N:   • Plan: bicarbonate gtt/Q6H BMP/NPO        Heme/Onc:   • Diagnosis: coagulopathy, supratherapeutic INR  ? Plan: hold home warfarin  ? S/p VitK, Kcentra  ? Trend INR  • Diagnosis: history of anemia due to chronic disease  ? Plan: daily CBC  ? Transfuse for hgb < 7        Endo:   • Diagnosis: hypoglycemia in setting of septic shock  ? Plan: POC glucose Q2H until WNL  • Diagnosis: DM2  ? Plan: hold home metformin  ? SSI        ID:   • Diagnosis: UTI  ? Plan: continue cefepime, vancomycin  ? Pharmacy consult  ? F/u blood, urine cultures        MSK/Skin:   • Diagnosis: back pain, chronic  ? Plan: analgesia as above  • Diagnosis: non-healing RLE wound  ? Plan: local wound care       Disposition: Continue Critical Care   Code Status: Level 1 - Full Code  ---------------------------------------------------------------------------------------  ICU CORE MEASURES    Prophylaxis   VTE Pharmacologic Prophylaxis: Pharmacologic VTE Prophylaxis contraindicated due to supratherapeutic INR  VTE Mechanical Prophylaxis: sequential compression device  Stress Ulcer Prophylaxis: Omeprazole PO    ABCDE Protocol (if indicated)  Plan to perform spontaneous awakening trial today?  Not applicable  Plan to perform spontaneous breathing trial today? Not applicable  Obvious barriers to extubation? Not applicable  CAM-ICU: Negative    Invasive Devices Review  Invasive Devices     Central Venous Catheter Line  Duration           CVC Central Lines 23  <1 day          Peripheral Intravenous Line  Duration           Peripheral IV 23 Left Antecubital <1 day    Peripheral IV 23 Right;Ventral (anterior) Forearm <1 day          Arterial Line  Duration           Arterial Line 23 Radial <1 day          Hemodialysis Catheter  Duration           HD Temporary Double Catheter 1 day          Drain  Duration           Urethral Catheter Temperature probe 16 Fr. <1 day              Can any invasive devices be discontinued today? No  ---------------------------------------------------------------------------------------  OBJECTIVE    Vitals   Vitals:    06/15/23 0200 06/15/23 0300 06/15/23 0400 06/15/23 0500   BP: (!) 95/47 (!) 90/46 (!) 96/45 (!) 91/48   BP Location:   Right arm    Pulse: 66 64 64 62   Resp: (!) 10 (!) 10 (!) 11 13   Temp: (!) 96.4 °F (35.8 °C) (!) 96.8 °F (36 °C) (!) 96.8 °F (36 °C) (!) 97.2 °F (36.2 °C)   TempSrc:   Bladder    SpO2: 100% 100% 100% 99%     Temp (24hrs), Av.9 °F (36.1 °C), Min:96.1 °F (35.6 °C), Max:97.9 °F (36.6 °C)  Current: Temperature: (!) 97.2 °F (36.2 °C)      Respiratory:  SpO2: SpO2: 99 %       Invasive/non-invasive ventilation settings   Respiratory    Lab Data (Last 4 hours)    None         O2/Vent Data (Last 4 hours)    None                Physical Exam  Vitals and nursing note reviewed. Constitutional:       General: He is not in acute distress. Appearance: He is well-developed. HENT:      Head: Normocephalic and atraumatic. Right Ear: External ear normal.      Left Ear: External ear normal.      Nose: Nose normal.   Eyes:      Conjunctiva/sclera: Conjunctivae normal.   Cardiovascular:      Rate and Rhythm: Normal rate and regular rhythm.    Pulmonary:      Effort: Pulmonary effort is normal. No respiratory distress. Breath sounds: Normal breath sounds. Abdominal:      Palpations: Abdomen is soft. Musculoskeletal:         General: No swelling. Right lower leg: Edema present. Left lower leg: Edema present. Skin:     General: Skin is warm and dry. Capillary Refill: Capillary refill takes less than 2 seconds. Neurological:      Mental Status: He is alert and oriented to person, place, and time.    Psychiatric:         Mood and Affect: Mood normal.         Behavior: Behavior normal.         Laboratory and Diagnostics:  Results from last 7 days   Lab Units 06/15/23  0233 06/14/23 2235 06/14/23  1520   EOS PCT % 0 0 0   HEMATOCRIT % 26.3* 27.6* 31.7*   HEMOGLOBIN g/dL 8.2* 8.4* 9.4*   MONOS PCT % 7 12 10   NEUTROS PCT % 82* 67 66   PLATELETS Thousands/uL 203 266 274   WBC Thousand/uL 9.19 6.14 6.08     Results from last 7 days   Lab Units 06/15/23  0233 06/15/23  0032 06/14/23 2235 06/14/23 2024 06/14/23  1851 06/14/23  1520   ANION GAP mmol/L 18* 18* 17* 12 18* 16*   ALBUMIN g/dL  --   --  2.8*  --   --  3.1*   ALK PHOS U/L  --   --  92  --   --  105   ALT U/L  --   --  20  --   --  19   AST U/L  --   --  33  --   --  28   BUN mg/dL 60* 63* 80* 51* 79* 81*   CALCIUM mg/dL 8.9 9.2 9.5 5.4* 9.4 9.2   CHLORIDE mmol/L 98 100 104 125* 101 102   CO2 mmol/L 15* 15* 11* 6* 10* 12*   CREATININE mg/dL 6.06* 6.49* 8.19* 4.97* 8.58* 8.70*   GLUCOSE RANDOM mg/dL 98 106 92 75 21* 43*   POTASSIUM mmol/L 5.3 5.4* 6.9* 4.1 8.1* 7.4*   SODIUM mmol/L 131* 133* 132* 143 129* 130*   TOTAL BILIRUBIN mg/dL  --   --  0.90  --   --  1.06*     Results from last 7 days   Lab Units 06/15/23  0233 06/15/23  0032 06/14/23  2235 06/14/23  1520   MAGNESIUM mg/dL 1.4* 1.8 1.9 2.1   PHOSPHORUS mg/dL 5.8* 6.2* 7.7*  --       Results from last 7 days   Lab Units 06/14/23  2240 06/14/23  2235 06/14/23  2045 06/14/23  1851   INR  2.35* 2.50* >15.00* >15.00*   PTT seconds  --   --  74* 77*          Results from last 7 days   Lab Units 06/15/23  0233 06/15/23  0032 06/14/23  2235 06/14/23  1926 06/14/23  1728   LACTIC ACID mmol/L 8.8* 8.5* 9.9* 9.4* 9.7*     ABG:  Results from last 7 days   Lab Units 06/14/23  2241   BASE EXC ART mmol/L -16.8   HCO3 ART mmol/L 9.9*   PCO2 ART mm Hg 26.2*   PH ART  7.193*   PO2 ART mm Hg 102.4   ABG SOURCE  Line, Arterial     VBG:  Results from last 7 days   Lab Units 06/14/23  2241 06/14/23  1815   BASE EXC ECTOR mmol/L  --  -18.8   HCO3 ECTOR mmol/L  --  8.6*   PCO2 ECTOR mm Hg  --  25.7*   PH ECTOR   --  7.142*   PO2 ECTOR mm Hg  --  128.3*   ABG SOURCE  Line, Arterial  --      Results from last 7 days   Lab Units 06/14/23  1851   PROCALCITONIN ng/ml 0.25       Micro  Results from last 7 days   Lab Units 06/14/23  1900 06/14/23  1851   BLOOD CULTURE  Received in Microbiology Lab. Culture in Progress. Received in Microbiology Lab. Culture in Progress. EKG: NSR  Imaging:  none new    Intake and Output  I/O       06/13 0701  06/14 0700 06/14 0701  06/15 0700    I.V.  1108.5    IV Piggyback  1400    Total Intake  2508.5    Urine  0    Other  500    Total Output  500    Net  +2008.5                  Height and Weights         There is no height or weight on file to calculate BMI. Weight (last 2 days)     None            Nutrition       Diet Orders   (From admission, onward)             Start     Ordered    06/15/23 0452  Diet Renal; Renal Restrictive; Yes; Fluid Restriction 800 ML; No  Diet effective now        References:    Adult Nutrition Support Algorithm    RD Therapeutic Diet Order Protocol   Question Answer Comment   Diet Type Renal    Renal Renal Restrictive    Should patient have a fluid restriction? Yes    Fluid Restriction Fluid Restriction 800 ML    Should patient have a protein modifier? No    RD to adjust diet per protocol?  Yes        06/15/23 0451                    Active Medications  Scheduled Meds:  Current Facility-Administered Medications   Medication Dose Route Frequency Provider Last Rate   • acetaminophen  488 mg Oral Q6H PRN Michell Villar MD     • Calcium Gluconate  2 g Intravenous Once Michell Villar MD     • cefepime  1,000 mg Intravenous Q12H Michell Villar MD     • chlorhexidine  15 mL Mouth/Throat Q12H [address] Michell Villar MD     • cholecalciferol  1,000 Units Oral Daily Michell Villar MD     • lidocaine  1 patch Topical Daily Michell Villar MD     • norepinephrine  1-30 mcg/min Intravenous Titrated Kimberley Vazquez, DO 12 mcg/min (06/15/23 0449)   • NxStage K 4/Ca 3  20,000 mL Dialysis Continuous Gianni Pringle MD     • ondansetron  4 mg Intravenous Q6H PRN Schuyler Diggs PA-C     • pantoprazole  20 mg Oral Early Morning Michell Villar MD     • sodium bicarbonate 150 mEq in dextrose 5 % 1,000 mL infusion  150 mL/hr Intravenous Continuous Luis E Bible,  mL/hr (06/15/23 0526)   • vancomycin  10 mg/kg (Adjusted) Intravenous Q12H Michell Villar MD       Facility-Administered Medications Ordered in Other Encounters   Medication Dose Route Frequency Provider Last Rate   • cefTRIAXone  2,000 mg Intravenous Once Cristiane Pearson MD     • sodium chloride  75 mL/hr Intravenous Continuous Cristiane Pearson MD Stopped (05/26/23 1426)     Continuous Infusions:  norepinephrine, 1-30 mcg/min, Last Rate: 12 mcg/min (06/15/23 0449)  NxStage K 4/Ca 3, 20,000 mL  sodium bicarbonate 150 mEq in dextrose 5 % 1,000 mL infusion, 150 mL/hr, Last Rate: 150 mL/hr (06/15/23 0526)      PRN Meds:   acetaminophen, 488 mg, Q6H PRN  ondansetron, 4 mg, Q6H PRN        Allergies   Allergies   Allergen Reactions   • Tramadol Other (See Comments)     intolerance     ---------------------------------------------------------------------------------------  Advance Directive and Living Will:      Power of :    POLST:    ---------------------------------------------------------------------------------------  Care Time Delivered:   No Critical Care time spent Ned Schuster MD, PGY-2      Portions of the record may have been created with voice recognition software. Occasional wrong word or "sound a like" substitutions may have occurred due to the inherent limitations of voice recognition software.   Read the chart carefully and recognize, using context, where substitutions have occurred

## 2023-06-15 NOTE — PROGRESS NOTES
Gato Quijano is a 81 y.o. male who is currently ordered Vancomycin IV with management by the Pharmacy Consult service. Relevant clinical data and objective / subjective history reviewed. Vancomycin Assessment:  Indication and Goal AUC/Trough: Urinary tract infection (goal -600, trough >10)  Clinical Status: critically ill   Micro:     Renal Function:  Renal replacement: CVVHD  Days of Therapy: 2  Current Dose: 1000 mg (10 mg/kg) Q12H  Vancomycin Plan:  New Dosing: Continue current dose - 1000 mg (10 mg/kg) Q12H  Estimated AUC: N/A  Estimated Trough: N/A  Next Level: trough 6/16 at 0730 prior to 4th dose to avoid PM vanco level  Renal Function Monitoring: Daily BMP and UOP  Pharmacy will continue to follow closely for s/sx of nephrotoxicity, infusion reactions and appropriateness of therapy. BMP and CBC will be ordered per protocol. We will continue to follow the patient’s culture results and clinical progress daily.     Checo Elliott, Pharmacist

## 2023-06-15 NOTE — H&P
H&P Exam - Critical Care   Melvina Nix 81 y.o. male MRN: [id number]  [facility]: [id number]      -------------------------------------------------------------------------------------------------------------  Chief Complaint: fatigue, generalized weakness    History of Present Illness   HX and PE limited by: none  Melvina Nix is a 81 y.o. male who presents to ED reporting 2 days of generalized weakness, fatigue, loss of appetite, and feeling generally unwell. Denies chest pain, dyspnea, isolated neuro deficits, or recent change in medications. Workup in ED significant for UTI, AGMA, hypoglycemia, lactic acidosis, and potassium > 7. Assessed by nephrology in ED. On arrival to ICU patient was alert and oriented but becoming increasingly dysarthric. Patient became bradycardic with widening QRS complexes on telemetry. Patient was treated w/ temporizing measures for hyperkalemia and put on HD.        History obtained from spouse, patient, chart review  -------------------------------------------------------------------------------------------------------------  Assessment and Plan:    Neuro:   • Diagnosis: encephalopathy, resolved, likely due to bradycardia, hypoglycemia  o Plan: Q4H neuro check  • Diagnosis: analgesia  o Plan: PRN tylenol, lidocaine patch      CV:   • Diagnosis: septic shock, UTI  o Plan: norepinephrine gtt PRN to maintain MAP > 65  • Diagnosis: history of diastolic CHF EF 48% 4568  o Plan: echo  o Hold home torsemide  • Diagnosis: history of PAF  o Plan: hold home sotolol  • Diagnosis: 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  o Plan: EP consult to interrogate ICD  • Diagnosis: HLD  o Plan: hold home statin  • Diagnosis: hypothermia in setting of septic shock  o Plan: caitlin hugger      Pulm:  • Diagnosis: R pleural effusion, chronic  o Plan: supplemental O2 PRN to maintain SpO2 > 94%      GI:   • Diagnosis: nausea, resolved  o Plan: ondansetron PRN  • Diagnosis: history of GERD  o Plan: continue home PPI      :   • Diagnosis: REMA, history of CKD3  o Plan: strict I/Os  o CVVH, even  o Nephrology consult  o Q6H BMP, Mg, Phos  • Diagnosis: AGMA  o Plan: bicarbonate gtt  o Trend LA  • Diagnosis: hyperkalemia  o Plan: repeat AM ECG  • Diagnosis: BPH  o Plan: alfred in place      F/E/N:   • Plan: bicarbonate gtt/Q6H BMP/NPO      Heme/Onc:   • Diagnosis: coagulopathy, supratherapeutic INR  o Plan: hold home warfarin  o S/p VitK, Kcentra  o Trend INR  • Diagnosis: history of anemia due to chronic disease  o Plan: daily CBC  o Transfuse for hgb < 7      Endo:   • Diagnosis: hypoglycemia in setting of septic shock  o Plan: POC glucose Q2H until WNL  • Diagnosis: DM2  o Plan: hold home metformin  o SSI      ID:   • Diagnosis: UTI  o Plan: continue cefepime, vancomycin  o Pharmacy consult  o F/u blood, urine cultures      MSK/Skin:   • Diagnosis: back pain, chronic  o Plan: analgesia as above  • Diagnosis: non-healing RLE wound  o Plan: local wound care      Disposition: Admit to Critical Care   Code Status: Level 1 - Full Code  --------------------------------------------------------------------------------------------------------------  Review of Systems   Constitutional: Positive for activity change and appetite change. Negative for chills and fever. HENT: Negative for sore throat and trouble swallowing. Eyes: Negative for pain and visual disturbance. Respiratory: Negative for cough and shortness of breath. Cardiovascular: Positive for leg swelling. Negative for chest pain and palpitations. Gastrointestinal: Negative for abdominal pain and vomiting. Genitourinary: Positive for difficulty urinating. Negative for dysuria and hematuria. Musculoskeletal: Positive for back pain. Negative for arthralgias. Skin: Positive for wound. Negative for color change and rash. Neurological: Positive for weakness. Negative for seizures and syncope.    All other systems reviewed and are negative. A 12-point, complete review of systems was reviewed and negative except as stated above     Physical Exam  Vitals and nursing note reviewed. Constitutional:       General: He is in acute distress. Appearance: He is well-developed. He is ill-appearing. He is not diaphoretic. HENT:      Head: Normocephalic and atraumatic. Right Ear: External ear normal.      Left Ear: External ear normal.      Nose: Nose normal.      Mouth/Throat:      Mouth: Mucous membranes are dry. Eyes:      General:         Right eye: No discharge. Left eye: No discharge. Extraocular Movements: Extraocular movements intact. Conjunctiva/sclera: Conjunctivae normal.      Pupils: Pupils are equal, round, and reactive to light. Cardiovascular:      Rate and Rhythm: Normal rate and regular rhythm. Pulses: Normal pulses. Pulmonary:      Effort: Pulmonary effort is normal. No respiratory distress. Breath sounds: Normal breath sounds. Abdominal:      General: There is no distension. Palpations: Abdomen is soft. Musculoskeletal:         General: No swelling. Right lower leg: Edema present. Left lower leg: Edema present. Skin:     General: Skin is warm and dry. Capillary Refill: Capillary refill takes less than 2 seconds. Comments: Large wound lower right leg, medial. No drainage, surrounding erythema, crepitus, or surrounding tenderness. Photo in chart   Neurological:      Mental Status: He is alert and oriented to person, place, and time. Cranial Nerves: No facial asymmetry.    Psychiatric:         Mood and Affect: Mood normal.         Behavior: Behavior normal.       --------------------------------------------------------------------------------------------------------------  Vitals:   Vitals:    06/15/23 0000 06/15/23 0030 06/15/23 0100 06/15/23 0130   BP: 122/59      BP Location:       Pulse: 60 64 64 66   Resp: 13 (!) 10 12 (!) 10   Temp: (!) 96.4 °F (35.8 °C)      TempSrc:       SpO2: 100%        Temp  Min: 96.4 °F (35.8 °C)  Max: 97.9 °F (36.6 °C)        There is no height or weight on file to calculate BMI.   N/A    Laboratory and Diagnostics:  Results from last 7 days   Lab Units 06/14/23 2235 06/14/23  1520   EOS PCT % 0 0   HEMATOCRIT % 27.6* 31.7*   HEMOGLOBIN g/dL 8.4* 9.4*   MONOS PCT % 12 10   NEUTROS PCT % 67 66   PLATELETS Thousands/uL 266 274   WBC Thousand/uL 6.14 6.08     Results from last 7 days   Lab Units 06/15/23  0032 06/14/23  2235 06/14/23  2024 06/14/23  1851 06/14/23  1520   ANION GAP mmol/L 18* 17* 12 18* 16*   ALBUMIN g/dL  --  2.8*  --   --  3.1*   ALK PHOS U/L  --  92  --   --  105   ALT U/L  --  20  --   --  19   AST U/L  --  33  --   --  28   BUN mg/dL 63* 80* 51* 79* 81*   CALCIUM mg/dL 9.2 9.5 5.4* 9.4 9.2   CHLORIDE mmol/L 100 104 125* 101 102   CO2 mmol/L 15* 11* 6* 10* 12*   CREATININE mg/dL 6.49* 8.19* 4.97* 8.58* 8.70*   GLUCOSE RANDOM mg/dL 106 92 75 21* 43*   POTASSIUM mmol/L 5.4* 6.9* 4.1 8.1* 7.4*   SODIUM mmol/L 133* 132* 143 129* 130*   TOTAL BILIRUBIN mg/dL  --  0.90  --   --  1.06*     Results from last 7 days   Lab Units 06/15/23  0032 06/14/23  2235 06/14/23  1520   MAGNESIUM mg/dL 1.8 1.9 2.1   PHOSPHORUS mg/dL 6.2* 7.7*  --       Results from last 7 days   Lab Units 06/14/23 2240 06/14/23 2235 06/14/23 2045 06/14/23  1851   INR  2.35* 2.50* >15.00* >15.00*   PTT seconds  --   --  74* 77*          Results from last 7 days   Lab Units 06/15/23  0032 06/14/23 2235 06/14/23 1926 06/14/23  1728   LACTIC ACID mmol/L 8.5* 9.9* 9.4* 9.7*     ABG:  Results from last 7 days   Lab Units 06/14/23  2241   BASE EXC ART mmol/L -16.8   HCO3 ART mmol/L 9.9*   PCO2 ART mm Hg 26.2*   PH ART  7.193*   PO2 ART mm Hg 102.4   ABG SOURCE  Line, Arterial     VBG:  Results from last 7 days   Lab Units 06/14/23 2241 06/14/23  1815   BASE EXC ECTOR mmol/L  --  -18.8   HCO3 ECTOR mmol/L  --  8.6*   PCO2 ECTOR mm Hg  -- 25.7*   PH ECTOR   --  7.142*   PO2 ECTOR mm Hg  --  128.3*   ABG SOURCE  Line, Arterial  --      Results from last 7 days   Lab Units 06/14/23  1851   PROCALCITONIN ng/ml 0.25       Micro:  Results from last 7 days   Lab Units 06/14/23  1900 06/14/23  1851   BLOOD CULTURE  Received in Microbiology Lab. Culture in Progress. Received in Microbiology Lab. Culture in Progress.        EKG: NSR  Imaging: I have personally reviewed pertinent films in PACS    Historical Information   Past Medical History:   Diagnosis Date   • Anemia    • Arthritis    • Atrial fibrillation ([address])    • Basal cell carcinoma 03/22/2022    Tip of Nose   • CHF (congestive heart failure) (HCC)    • Diabetes mellitus (HCC)     Niddm   • DVT (deep vein thrombosis) in pregnancy 1966    not in pregnancy   • DVT (deep venous thrombosis) ([address]) 1966   • Dyslipidemia    • Encephalopathy acute 11/4/2022   • GERD (gastroesophageal reflux disease)    • Hyperlipidemia    • Hypertension    • Hypomagnesemia 10/19/2022   • Irregular heart beat     Afib   • Morbid obesity due to excess calories ([address])     Resolved 9/2/2014    • Pulmonary embolism (HCC)    • Sepsis ([address])    • Squamous cell skin cancer 07/30/2020    Left posterior scalp   • Visual impairment      Past Surgical History:   Procedure Laterality Date   • AORTIC VALVE REPLACEMENT     • CARDIAC DEFIBRILLATOR PLACEMENT  04/2014   • CARDIAC SURGERY  02/2014    AVR   • COLONOSCOPY     • INSERT / REPLACE / REMOVE PACEMAKER     • IR ASPIRATION BAKERS CYST  3/8/2023   • IR CHOLECYSTOSTOMY TUBE CHECK/CHANGE/REPOSITION/REINSERTION/UPSIZE  10/13/2022   • IR CHOLECYSTOSTOMY TUBE CHECK/CHANGE/REPOSITION/REINSERTION/UPSIZE  10/19/2022   • IR CHOLECYSTOSTOMY TUBE CHECK/CHANGE/REPOSITION/REINSERTION/UPSIZE  10/27/2022   • IR CHOLECYSTOSTOMY TUBE CHECK/CHANGE/REPOSITION/REINSERTION/UPSIZE  11/7/2022   • IR CHOLECYSTOSTOMY TUBE CHECK/CHANGE/REPOSITION/REINSERTION/UPSIZE  11/10/2022   • IR CHOLECYSTOSTOMY TUBE CHECK/CHANGE/REPOSITION/REINSERTION/UPSIZE  12/1/2022   • IR CHOLECYSTOSTOMY TUBE CHECK/CHANGE/REPOSITION/REINSERTION/UPSIZE  1/6/2023   • IR CHOLECYSTOSTOMY TUBE CHECK/CHANGE/REPOSITION/REINSERTION/UPSIZE  1/24/2023   • IR CHOLECYSTOSTOMY TUBE CHECK/CHANGE/REPOSITION/REINSERTION/UPSIZE  3/15/2023   • IR CHOLECYSTOSTOMY TUBE PLACEMENT  9/1/2022   • IR DRAINAGE TUBE CHECK AND/OR REMOVAL  3/22/2023   • IR DRAINAGE TUBE CHECK AND/OR REMOVAL  4/10/2023   • IR DRAINAGE TUBE PLACEMENT  4/6/2023   • JOINT REPLACEMENT Left     LTKR   • MOHS SURGERY  07/30/2020    Left posterior scalp, Dr. Vivian Galeano   • [address]  04/18/2022    BCC Tip of Nose- Dr. Vivian Galeano   • Vanda Southward DIVJ&STRIP LONG SAPH SAPHFEM Janalyn Wendy Right 8/17/2018    Procedure: LEG PERFORATED INJECTION SCLEROTHERAPY;  Surgeon: Yuval Moe MD;  Location: [facility];  Service: Vascular   • REPLACEMENT TOTAL KNEE Right    • TOTAL KNEE ARTHROPLASTY Left    • VASCULAR SURGERY     • VENA CAVA FILTER PLACEMENT      Interruption inferior vena cava, Josue filter, placement   • WISDOM TOOTH EXTRACTION       Social History   Social History     Substance and Sexual Activity   Alcohol Use Not Currently    Comment: no alcohol in 28 yrs     Social History     Substance and Sexual Activity   Drug Use Never     Social History     Tobacco Use   Smoking Status Never   Smokeless Tobacco Never     Exercise History:   Family History:   Family History   Problem Relation Age of Onset   • Arthritis Mother    • Stroke Mother    • Arthritis Father    • Cancer Father    • Arthritis Daughter      I have reviewed this patient's family history and commented on sigificant items within the HPI      Medications:  Current Facility-Administered Medications   Medication Dose Route Frequency   • chlorhexidine (PERIDEX) 0.12 % oral rinse 15 mL  15 mL Mouth/Throat Q12H [facility]   • norepinephrine (LEVOPHED) 4 mg (STANDARD CONCENTRATION) IV in sodium chloride 0.9% 250 mL  1-30 mcg/min Intravenous Titrated   • NxStage K 4/Ca 3 dialysis solution (RFP-401) 20,000 mL  20,000 mL Dialysis Continuous   • ondansetron (ZOFRAN) injection 4 mg  4 mg Intravenous Q6H PRN   • sodium bicarbonate 150 mEq in dextrose 5 % 1,000 mL infusion  150 mL/hr Intravenous Continuous     Facility-Administered Medications Ordered in Other Encounters   Medication Dose Route Frequency   • cefTRIAXone (ROCEPHIN) 2,000 mg in dextrose 5 % 50 mL IVPB  2,000 mg Intravenous Once   • sodium chloride 0.9 % infusion  75 mL/hr Intravenous Continuous     Home medications:  Prior to Admission Medications   Prescriptions Last Dose Informant Patient Reported? Taking? Acetaminophen (TYLENOL EXTRA STRENGTH PO)  Self Yes No   Sig: Take 500 mg by mouth if needed (for pain as needed). Indications: pain as needed   B Complex-C (SUPER B COMPLEX PO)  Self Yes No   Sig: Take 1 capsule by mouth daily    Cholecalciferol 25 MCG (1000 UT) capsule  Self Yes No   Sig: Take 3,000 Units by mouth daily. Indications: Vitamin D Deficiency   Diclofenac Sodium (VOLTAREN) 1 %  Self No No   Sig: Apply 2 g topically 4 (four) times a day   Iron Combinations (NIFEREX) TABS  Self Yes No   Sig: Take 1 tablet by mouth in the morning   Multiple Vitamin (MULTIVITAMINS PO)  Self Yes No   Sig: Take 1 tablet by mouth daily   Potassium Chloride ER 20 MEQ TBCR  Self Yes No   Sig: Take 20 mEq by mouth in the morning. Indications: Low Amount of Potassium in the Blood   atorvastatin (LIPITOR) 40 mg tablet  Self Yes No   Sig: Take 40 mg by mouth daily after dinner Atorvastatin Calcium 40 MG Oral Tablet Take 1 tablet daily  Refills: 0  Active    fluticasone (FLONASE) 50 mcg/act nasal spray  Self Yes No   Si sprays into each nostril daily as needed Shake liquid and spray   gabapentin (NEURONTIN) 100 mg capsule  Self Yes No   Sig: Take 100 mg by mouth daily   lubiprostone (AMITIZA) 24 mcg capsule  Self Yes No   Sig: Take 24 mcg by mouth 2 (two) times a day with meals. Indications: Chronic Constipation of Unknown Cause   metFORMIN (GLUCOPHAGE) 1000 MG tablet  Self Yes No   Sig: Take 1,000 mg by mouth daily with dinner. Indications: Type 2 Diabetes   omeprazole (PriLOSEC) 20 mg delayed release capsule  Self Yes No   Sig: Omeprazole 20 MG Oral Tablet Delayed Release Take 1 tablet daily  Refills: 0  Active   polyethylene glycol (GLYCOLAX) 17 GM/SCOOP powder  Self Yes No   Sig: Take 17 g by mouth 2 (two) times a day   senna-docusate sodium (SENOKOT S) 8.6-50 mg per tablet  Self No No   Sig: Take 1 tablet by mouth daily at bedtime   sodium hypochlorite (DAKIN'S HALF-STRENGTH) external solution   No No   Sig: Apply 1 application. topically daily At each dressing change   sotalol (BETAPACE) 80 mg tablet   No No   Sig: Take 1 tablet (80 mg total) by mouth daily   torsemide 40 MG TABS   No No   Sig: Take 40 mg by mouth 2 (two) times a day   warfarin (COUMADIN) 2 mg tablet  Self No No   Sig: Take 2 tablets (4 mg total) by mouth daily Acknowledge   Patient taking differently: Take 4 mg by mouth daily. 6.7.23   Coumadin 4 mg daily. PT.INR  6.13.23. Indications: Atrial Fibrillation      Facility-Administered Medications Last Administration Doses Remaining   lidocaine (LMX) 4 % cream 6/13/2023  3:05 PM 0        Allergies: Allergies   Allergen Reactions   • Tramadol Other (See Comments)     intolerance     ------------------------------------------------------------------------------------------------------------  Advance Directive and Living Will:      Power of :    POLST:    ------------------------------------------------------------------------------------------------------------  Anticipated Length of Stay is > 2 midnights    Care Time Delivered:   Upon my evaluation, this patient had a high probability of imminent or life-threatening deterioration due to hyperkalemia, acute renal failure, septic shock, which required my direct attention, intervention, and personal management.   I have personally provided 45 minutes (2300 to 2345) of critical care time, exclusive of procedures, teaching, family meetings, and any prior time recorded by providers other than myself. Zaira Hernadez MD, PGY-2        Portions of the record may have been created with voice recognition software. Occasional wrong word or "sound a like" substitutions may have occurred due to the inherent limitations of voice recognition software.   Read the chart carefully and recognize, using context, where substitutions have occurred

## 2023-06-15 NOTE — PROGRESS NOTES
[address] [facility]  Progress Note: Critical Care  Name: Ines Beltran 81 y.o. male I MRN: [id number]  Unit/Bed#: MICU 15 I Date of Admission: 6/14/2023   Date of Service: 6/15/2023 I Hospital Day: 1    Assessment/Plan      Problems  · Septic shock secondary to UTI  · REMA on CKD 3  · Hyperkalemia  · Supratherapeutic INR  · Hypoglycemia  · DM 2  · Diastolic CHF  · PAF  · Chronic right pleural effusion  · Chronic right lower extremity wound    Neuro:   Diagnosis: Toxic metabolic encephalopathy, resolved, likely due to bradycardia, hypoglycemia, in the setting of sepsis, REMA and Electrolyte Imbalance evidenced by Dysarthria, treated with Hemodialysis and Frequent Neuro Checks  ? Plan: Q4H neuro check  • Diagnosis: analgesia  ? Plan: PRN tylenol, lidocaine patch        CV:   • Diagnosis: septic shock, UTI  ? Plan: Wean norepinephrine gtt to maintain MAP > 65; continue stress dose steroids, if able to wean pressors we will start UF 25 to 50 cc an hour  • Diagnosis: history of diastolic CHF EF 79%   • Last echo in 2022; home medications include torsemide 40 twice daily   • Echo this admission EF of 70% with signs of volume overload  ? Plan: echo  ? Hold home torsemide  • Diagnosis: history of PAF  ? Plan: hold home sotolol  ? 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 Patient interrogated with no findings  • Diagnosis: History of HLD on atorvastatin 40  ? Plan: hold home statin  • Diagnosis: hypothermia in setting of septic shock  ? Plan: acitlin bal    Pulm:  • Diagnosis: R pleural effusion, chronic  ? Plan: supplemental O2 PRN to maintain SpO2 > 88 %     GI:   • Diagnosis: nausea, resolved  ? Plan: ondansetron PRN  • Diagnosis: history of GERD  ?  Plan: continue home PPI       :   • Diagnosis: REMA, history of CKD3  • Suspected ATN secondary to contrast about 7 days ago, along with suspected septic shock, along with metformin toxicity  ? Plan: strict I/Os  ? CVVH, even  ? Pending metformin level  ? We will start UF once pressors are weaned  ? Q6H BMP, Mg, Phos  • Diagnosis: AGMA  • Anion gap is closed with improving bicarb  ? Plan: bicarbonate gtt and will begin to wean once bicarb is WNL  ? Trend LA and BMP and will plan to wean bicarb  • Diagnosis: hyperkalemia, resolved  ? Plan: CVVH  • Diagnosis: BPH  ? Plan: alfred in place        F/E/N:   • Plan: bicarbonate gtt/Q6H BMP/NPO        Heme/Onc:   • Diagnosis: coagulopathy, supratherapeutic INR on presentation; resolved  ? S/p VitK, Kcentra  ? Trend INR   Plan: hold home warfarin; INR on 6/16 is subtherapeutic 1.95 and will start heparin    -Daily INRs  • Diagnosis: history of anemia due to chronic disease  ? Plan: daily CBC  ? Transfuse for hgb < 7        Endo:   • Diagnosis: hypoglycemia in setting of septic shock  ? Plan: POC glucose Q2H until WNL  • Diagnosis: DM2  ? Plan: hold home metformin  ? SSI        ID:   • Diagnosis: UTI  • Urine culture showed 50,000-59,000 enteric like gram-negative rods  ? MRSA in process and blood cultures x2 from 4/16 negative  ? Plan: continue cefepime, will stop vancomycin  ? F/u blood, urine cultures         MSK/Skin:   • Diagnosis: back pain, chronic  ? Plan: analgesia as above  • Diagnosis: non-healing RLE wound  ? Plan: local wound care    ICU Core Measures     A: Assess, Prevent, and Manage Pain · Has pain been assessed? Yes  · Need for changes to pain regimen? No   B: Both SAT/SAT  · N/A   C: Choice of Sedation · RASS Goal: 0 Alert and Calm  · Need for changes to sedation or analgesia regimen? No   D: Delirium · CAM-ICU: Negative   E: Early Mobility  · Plan for early mobility? Yes   F: Family Engagement · Plan for family engagement today? Yes       Antibiotic Review: Patient on appropriate coverage based on culture data. Review of Invasive Devices:     Alfred Plan: Continue for accurate I/O monitoring for 48 hours  Central access plan: Medications requiring central line  Kaylee Plan: Keep arterial line for hemodynamic monitoring and frequent ABGs    Prophylaxis:  VTE VTE covered by:    Greer Michelle       Stress Ulcer  covered bypantoprazole (PROTONIX) EC tablet 20 mg [id number]          Subjective      The patient is an 50-year-old male with past medical history of diastolic CHF, CKD 3, type 2 diabetes, paroxysmal atrial fibrillation, H/o endocarditis and VT with AVR bioprosthetic and bivent ICD, hyperlipidemia, BPH who presented to the ED for 2-day history of generalized weakness, loss balance, fatigue, and loss of appetite. Of note he had presented to the ED on 6/7 after a fall at home on Coumadin where they performed a CT with contrast, was cleared and sent home. Work-up in the ED revealed creatinine of 8.7 baseline around 1.3, potassium 7.4, bicarb 12, anion gap of 16, and lactate of 9.0. EKG showed wide QRS. initial ABG revealed pH of 7.19, PCO2 of 26.2, PO2 of 102.4, and bicarb of 9.9. Subsequently given IV insulin with dextrose, albuterol nebulizer, 3 g of calcium chloride, 3 g of calcium gluconate, 100 mEq of sodium bicarb, and 1 L bolus of normal saline. CT abdomen ruled out obstructive uropathy, CT was negative, UA revealed 3+ protein, innumerable WBCs, bacteriuria. INR was greater than 15 and was given vitamin K and Kcentra. He is admitted to the ICU were on the way his blood pressures began to decrease and was started on Levophed. His potassium remained elevated, had a femoral dialysis catheter placed, and nephrology taken to dialysis removing 500 mL and transition to CVVH. 24-hour events- overnight, right femoral dialysis catheter had clogged and a left IJ hemodialysis catheter was placed which also was nonfunctional and clotted and removed. Cathflo was administered in the right femoral and is functioning fine afterwards.   Patient was transiently requiring BiPAP weaned to high flow nasal cannula now on room air. Patient reported feeling unwell overnight but has no acute complaints now. Review of Systems   Constitutional: Negative for chills and fever. Respiratory: Negative for cough, shortness of breath and stridor. Cardiovascular: Negative for chest pain and leg swelling. Gastrointestinal: Negative for abdominal pain, constipation, diarrhea and nausea. Genitourinary: Negative for difficulty urinating. Musculoskeletal: Positive for back pain. Neurological: Negative for dizziness and weakness. Objective                            Vitals I/O      Most Recent Min/Max in 24hrs   Temp (!) 97.2 °F (36.2 °C) Temp  Min: 96.1 °F (35.6 °C)  Max: 97.9 °F (36.6 °C)   Pulse 64 Pulse  Min: 52  Max: 68   Resp (!) 11 Resp  Min: 10  Max: 20   BP (!) 81/45 BP  Min: 81/45  Max: 122/59   O2 Sat 100 % SpO2  Min: 95 %  Max: 100 %      Intake/Output Summary (Last 24 hours) at 6/15/2023 0622  Last data filed at 6/15/2023 0600  Gross per 24 hour   Intake 3199.8 ml   Output 1002 ml   Net 2197.8 ml         Diet Renal; Renal Restrictive; Yes; Fluid Restriction 800 ML; No     Invasive Monitoring Physical exam   Arterial Line  Kaylee /56  Arterial Line BP  Min: 82/52  Max: 158/56   MAP 84 mmHg  Arterial Line MAP (mmHg)  Min: 56 mmHg  Max: 86 mmHg    Physical Exam  Constitutional:       General: He is not in acute distress. Appearance: Normal appearance. He is obese. He is ill-appearing. He is not toxic-appearing. HENT:      Head: Normocephalic and atraumatic. Cardiovascular:      Rate and Rhythm: Normal rate and regular rhythm. Heart sounds: No murmur heard. No gallop. Pulmonary:      Effort: No respiratory distress. Breath sounds: No wheezing or rales. Abdominal:      General: Abdomen is flat. There is no distension. Tenderness: There is no abdominal tenderness. There is no guarding. Musculoskeletal:      Right lower leg: Edema present.       Left lower leg: Edema present. Neurological:      Mental Status: He is alert and oriented to person, place, and time. Psychiatric:         Mood and Affect: Mood normal.         Behavior: Behavior normal.            Diagnostic Studies            Medications:  Scheduled PRN   Calcium Gluconate, 2 g, Once  cefepime, 1,000 mg, Q12H  chlorhexidine, 15 mL, Q12H AMANDA  cholecalciferol, 1,000 Units, Daily  lidocaine, 1 patch, Daily  magnesium sulfate, 1 g, Once  pantoprazole, 20 mg, Early Morning  vancomycin, 10 mg/kg (Adjusted), Q12H      acetaminophen, 488 mg, Q6H PRN  ondansetron, 4 mg, Q6H PRN       Continuous    norepinephrine, 1-30 mcg/min, Last Rate: 13 mcg/min (06/15/23 0553)  NxStage K 4/Ca 3, 20,000 mL  sodium bicarbonate 150 mEq in dextrose 5 % 1,000 mL infusion, 150 mL/hr, Last Rate: 150 mL/hr (06/15/23 0526)         Labs:    CBC    Recent Labs     06/15/23  0233 06/15/23  0526   HCT 26.3* 26.0*   HGB 8.2* 7.8*    180   WBC 9.19 11.24*     BMP    Recent Labs     06/15/23  0233 06/15/23  0503   AGAP 18* 16*   BUN 60* 54*   CALCIUM 8.9 8.8   CL 98 100   CO2 15* 17*   CREATININE 6.06* 5.55*   K 5.3 5.2   SODIUM 131* 133*       Coags    Recent Labs     06/14/23 2045 06/14/23  2235 06/14/23  2240 06/15/23  0503   INR >15.00*   < > 2.35* 2.51*   PTT 74*  --   --  43*    < > = values in this interval not displayed.         Additional Electrolytes  Recent Labs     06/15/23  0233 06/15/23  0503   CAIONIZED 1.09* 1.08*   MG 1.4* 1.5*   PHOS 5.8* 5.1*          Blood Gas    Recent Labs     06/14/23 2241   BEART -16.8   ISA2JGR 9.9*   [id number] 26.2*   PHART 7.193*   PO2ART 102.4   SOURCE Line, Arterial     Recent Labs     06/14/23  1815 06/14/23  2241   BEVEN -18.8  --    [id number] 8.6*  --    ZEN1DJF 25.7*  --    PHVEN 7.142*  --    PO2VEN 128.3*  --    SOURCE  --  Line, Arterial    LFTs  Recent Labs     06/14/23  1520 06/14/23  2235   ALB 3.1* 2.8*   ALKPHOS 105 92   ALT 19 20   AST 28 33   TBILI 1.06* 0.90       Infectious  Recent Labs 06/14/23  1851   PROCALCITONI 0.25     Glucose  Recent Labs     06/14/23  2235 06/15/23  0032 06/15/23  0233 06/15/23  0503   GLUC 92 106 98 96                Het SARWAT Herrera, DO

## 2023-06-15 NOTE — PROGRESS NOTES
I have personally seen and examined patient and reviewed all data with resident. Agree with note, assessment and plan. Critical care time 70 minutes. Please refer to attending comments below. Critical care time does not include procedures, family meeting or teaching. Patient presented to the ED after 2 days feeling weak as well as nausea and vomiting. Labs obtained in triage with elevated potassium and creatinine. Nephrology was contacted in the emergency department and plans for IHD. Likely IgG to be followed with CVVH. Patient had several episodes of hypoglycemia requiring D50 administration in the emergency department. Visit Vitals  BP (!) 91/48   Pulse 62   Temp (!) 97.2 °F (36.2 °C)   Resp 13   SpO2 99%   Smoking Status Never     GEN: Appears ill, awake and engages in conversation  HEENT: EOMI, PERRLA, dry  CV: Irregular with wide-complex on telemetry, systolic Murmur, edema bilateral lower extremity  Resp:  CTA, no R/R/W  GI: soft,NT/ND, no CVA tenderness  : urinary catheter in place  Neuro: non focal motor exam, CN symmetric, normal speech  Skin: warm, dry, right lower extremity dressing with chronic wound    All laboratory and imaging reviewed    Septic shock due to uti  REMA on CKD 3  Hyperkalemia  Severe metabolic acidosis  Hypoglycemia  Coagulopathy due to coumadin with inr >15  Bradycardia/arrhythmia secondary to metabolic etiology with hyperkalemia and metabolic acidosis  Afib/ PAF  Chronic diastolic HF EF 89% 3745  H/o endocarditis and VT with AVR bioprosthetic and bivent ICD  Right pleural effusion- chronic  HLD  DM2  Hyponatremia-secondary to intravascular volume depletion/dehydration as well as acute kidney injury  BPH-urinary catheter in place  Chronic back pain-pain medications as needed  H/o GIB  RLE wound-consult wound care  Obesity with a BMI of 39.5  Moderate protein calorie malnutrition with low albumin    Patient received ongoing resuscitation with IV fluids.   Monitor endpoints of resuscitation. Patient's lactic acid level was elevated initially at 9.7. Lactic acid level did not significantly improve and remains elevated 8.8. Continue to trend. Elevated lactic acid levels may be multifactorial related to poor liver clearance as well as acute kidney injury with ongoing sepsis while trying to achieve source control. He was subsequently initiated on vasopressors for hemodynamic support with goal mean arterial blood pressure greater than 65. Presently he is on norepinephrine 12 mcg/min. Continue with empiric antibiotics for urine source of infection. Maintain urinary catheter. Cultures pending. CT of the abdomen pelvis performed without any acute abdominal or pelvic pathology identified. On clinical exam patient does not have abdominal tenderness. Low suspicion that patient has a bowel etiology for his elevated lactic acid levels and sepsis. Nephrology consultation for acute kidney injury on chronic kidney disease with severe metabolic acidosis and hyperkalemia. Plan is for hemodialysis x2 hours and then transition to CVVH. Continue to monitor patient's electrolytes closely. Patient received multiple doses of bicarbonate as well as initiated on bicarbonate infusion and received multiple doses of calcium. He also received insulin and dextrose for his hyperkalemia. Continue with bicarbonate infusion. Goal serum bicarbonate greater than 20 with a pH greater than 7.2 and then consider discontinuing while maintaining on CVVH. CVVH volume status to be run even. Patient had wide-complex bradycardia with labile blood pressure. This was likely secondary to the hyperkalemia as well as severe metabolic acidosis. Patient's arrhythmia has improved and is now more narrow and complex with improved heart rate. Patient did have an introducer catheter placed in the right IJ for possible transvenous pacemaker placement.   Once hemodialysis was started and patient was maintained on a bicarbonate infusion his wide-complex arrhythmia improved in appearance. Continue to monitor patient's hemodynamic status. Obtain echocardiogram.  Recheck EKG in a.m. Trend troponin. Interrogate patient's ICD. INR have been elevated to greater than 15. Patient is on Coumadin for atrial fibrillation. He received Kcentra as well as vitamin K. Recheck INR 2.35. No further correction performed at this time. Continue to trend patient's INR for rebound effect. INR elevated likely secondary to acute illness. Patient did not have any acute signs of bleeding at this time. Monitor hemoglobin as well as coagulation studies. Heparin has not been initiated at this time. Patient is on Coumadin for his atrial fibrillation. He also has a history of DVT in the past.    Continue to monitor patient's glucose every 2 hours. He is on D5 bicarb infusion at this time. He did have blood glucoses of 56 earlier in the evening which required correction with dextrose solution. At home patient is on metformin, no other glycemic agents identified from his home medication list.  Check C-peptide and insulin level for completeness of work-up. Patient does not have any insulin home regimen documented. He had been nauseated and vomiting for a few days and has had a poor appetite. His hypoglycemia may be secondary to his critical illness.

## 2023-06-15 NOTE — DISCHARGE INSTR - OTHER ORDERS
Skin Care Plan:  1-Cleanse sacro-buttocks with soap and water. Apply Calazime to B/L Sacro-Buttocks TID and PRN episodes of incontinence. 2-Turn/reposition q2h or when medically stable for pressure re-distribution on skin . 3-Elevate heels to offload pressure. 4-Moisturize skin daily with skin nourishing cream  5-Ehob cushion in chair when out of bed. 6-Preventative Hydraguard to bilateral heels BID and PRN. 7-[facility] Specialty Mattress Ordered for Patient. 8-Cleanse right leg with normal saline, apply silver alginate to wound bed, cover with ABD, secure with juanjo and tape. Change daily and PRN soilage/displacement. 9-Cleanse B/L arm skin tears with soap and water. Apply Allevyn foam, angelo T for treatment, and change every 3 days and PRN soilage/displacement  10-Right Thigh/Abdomen/Pannus fold: Cleanse with and water, pat dry. Apply maxorb rope to area. Change daily or PRN soilage or displacement. Continue to follow-up with outpatient wound center for continued treatment of right lower leg wound.

## 2023-06-15 NOTE — PROCEDURES
Central Line Insertion    Date/Time: 6/15/2023 1:32 AM    Performed by: Gage Mccullough MD  Authorized by: Gage Mccullough MD    Patient location:  ICU  Other Assisting Provider: Yes (comment) Wilmar Rayo PA-C)    Consent:     Consent obtained:  Written    Consent given by:  Patient    Risks discussed:  Arterial puncture, incorrect placement, nerve damage, pneumothorax, infection and bleeding  Universal protocol:     Procedure explained and questions answered to patient or proxy's satisfaction: yes      Relevant documents present and verified: yes      Required blood products, implants, devices, and special equipment available: yes      Site/side marked: yes      Immediately prior to procedure, a time out was called: no      Patient identity confirmed:  Hospital-assigned identification number  Pre-procedure details:     Hand hygiene: Hand hygiene performed prior to insertion      Sterile barrier technique: All elements of maximal sterile technique followed      Skin preparation:  ChloraPrep    Skin preparation agent: Skin preparation agent completely dried prior to procedure    Indications:     Central line indications: dialysis      Site selection rationale:  Supratherapeutic INR  Anesthesia (see MAR for exact dosages):      Anesthesia method:  Local infiltration    Local anesthetic:  Lidocaine 1% w/o epi  Procedure details:     Location:  Right femoral    Vessel type: vein      Laterality:  Right    Approach: percutaneous technique used      Patient position:  Flat    Catheter type:  Double lumen    Catheter size:  7 Fr    Landmarks identified: yes      Ultrasound guidance: yes      Ultrasound image availability:  Images available in PACS and still images obtained    Sterile ultrasound techniques: Sterile gel and sterile probe covers were used      Manometry confirmation: no      Number of attempts:  1    Successful placement: yes    Post-procedure details:     Post-procedure:  Dressing applied and line sutured    Assessment:  Blood return through all ports and free fluid flow    Post-procedure complications: none      Patient tolerance of procedure:   Tolerated well, no immediate complications

## 2023-06-15 NOTE — CASE MANAGEMENT
Case Management Assessment & Discharge Planning Note    Patient name Oliver Mcdonald  Location MICU 13/MICU 15 MRN [id number]  : 1935 Date 6/15/2023       Current Admission Date: 2023  Current Admission Diagnosis:Acute on chronic anemia   Patient Active Problem List    Diagnosis Date Noted   • Acidosis 06/15/2023   • Hyperkalemia 06/15/2023   • Hyponatremia 06/15/2023   • Hyperphosphatemia 06/15/2023   • Fall 2023   • Pulmonary embolism ([address])    • CHF (congestive heart failure) ([address])    • Melena 2023   • Extrahepatic biloma 2023   • COVID-19 2023   • Pleural effusion on right 2023   • Calculus of gallbladder 2023   • Cholecystostomy care ([address]) 2022   • Gout 2022   • Acute on chronic cholecystitis 2022   • Hypomagnesemia 10/19/2022   • Acute cholecystitis 2022   • Benign prostatic hyperplasia with lower urinary tract symptoms 2022   • Morbid obesity    • CKD (chronic kidney disease) stage 3, GFR 30-59 ml/min ([address]) 2022   • Acute kidney injury superimposed on stage 3 chronic kidney disease  2021   • Osteoarthritis, knee 11/15/2021   • Status post right knee replacement 11/15/2021   • Hyperlipidemia 2021   • Swelling of limb 2021   • History of venous thromboembolism 09/10/2020   • Secondary lymphedema 2018   • Venous ulcer of right ankle 2018   • Acute on chronic diastolic CHF    • History of AVR 2018   • Thrombophlebitis 2018   • History of ventricular tachycardia 2018   • Chronic bilateral low back pain without sciatica 2017   • BPH (benign prostatic hyperplasia) 2017   • Obesity, unspecified obesity severity, unspecified obesity type 2017   • Hydrocele 2017   • Chronic anticoagulation 2017   • Acute on chronic anemia 2017   • Renal cyst 2017   • 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 2015   • Ventricular tachycardia 04/15/2014   • Paroxysmal atrial fibrillation  03/19/2014   • Edema 03/19/2014   • Endocarditis 03/19/2014   • Type 2 diabetes mellitus with diabetic neuropathy 08/19/2013   • Peripheral venous insufficiency 08/19/2013   • Cardiomegaly 08/19/2013   • PVD (peripheral vascular disease) ([address]) 04/19/2013   • Dyslipidemia 04/19/2013      LOS (days): 1  Geometric Mean LOS (GMLOS) (days): 5.00  Days to GMLOS:4.2     OBJECTIVE:  PATIENT READMITTED TO HOSPITAL  Risk of Unplanned Readmission Score: 43.17         Current admission status: Inpatient       Preferred Pharmacy:   [address] [address]  Phone: [phone] Fax: [phone]    UNKNOWN - FOLLOW UP PRIOR TO DISCHARGE TO E-PRESCRIBE  No address on file      Primary Care Provider: Carlos Benedict DO    Primary Insurance: MEDICARE  Secondary Insurance: [facility] HEALTH OPTIONS PROGRAM    ASSESSMENT:  [address], [address] [facility] Representative - Daughter   Primary Phone: [phone] (Mobile)                         Readmission Root Cause  30 Day Readmission: Yes  Who directed you to return to the hospital?: Self  Did you understand whom to contact if you had questions or problems?: Yes  Did you get your prescriptions before you left the hospital?: Yes  Were you able to get your prescriptions filled when you left the hospital?: Yes  Did you take your medications as prescribed?: Yes  Were you able to get to your follow-up appointments?: Yes  During previous admission, was a post-acute recommendation made?: Yes  What post-acute resources were offered?: [facility]  Patient was readmitted due to: weakness, fatigue  Action Plan: admit to MICU    Patient Information  Admitted from[de-identified] Home  Mental Status: Alert  During Assessment patient was accompanied by: Spouse  Assessment information provided by[de-identified] Spouse  Support Systems: Self, Spouse/significant other, [address] of Residence: [facility] [address] [facility] do you live in?: [place] entry access options.  Select all that apply.: No steps to enter home  Type of Current Residence: [place]  In the last 12 months, was there a time when you were not able to pay the mortgage or rent on time?: No  In the last 12 months, was there a time when you did not have a steady place to sleep or slept in a shelter (including now)?: No  Homeless/housing insecurity resource given?: N/A  Living Arrangements: Lives w/ Spouse/significant other, Other (Comment) (Daughter, NABOR, and 20 yr grandchild live in upstairs apartment)  Is patient a ?: No    Activities of Daily Living Prior to Admission  Functional Status: Independent  Completes ADLs independently?: Yes  Ambulates independently?: Yes  Does patient use assisted devices?: No  Does patient currently own DME?: Yes  What DME does the patient currently own?: [employer]ort[employer] ([employer])  Does patient have a history of Outpatient Therapy (PT/OT)?: Yes  Does the patient have a history of Short-Term Rehab?: No  Does patient have a history of HHC?: Yes  Does patient currently have [address] [facility]?: Yes    Current Home Health Care  Type of Current Home Care Services: Nurse visit  Current Home Health Agency[de-identified] [address] [place] [address] Provider[de-identified] PCP    Patient Information Continued  Income Source: Pension/prison  Does patient have prescription coverage?: Yes  Within the past 12 months, you worried that your food would run out before you got the money to buy more.: Never true  Within the past 12 months, the food you bought just didn't last and you didn't have money to get more.: Never true  Food insecurity resource given?: N/A  Does patient receive dialysis treatments?: No  Does patient have a history of substance abuse?: No  Does patient have a history of Mental Health Diagnosis?: No         Means of Transportation  In the past 12 months, has lack of transportation kept you from medical appointments or from getting medications?: No  In the past 12 months, has lack of transportation kept you from meetings, work, or from getting things needed for daily living?: No        DISCHARGE DETAILS:    Discharge planning discussed with[de-identified] patient, wife at bedside  Freedom of Choice: Yes  Comments - Freedom of Choice: return referral to [address] [place] [address] contacted family/caregiver?: Yes  Were Treatment Team discharge recommendations reviewed with patient/caregiver?: Yes  Did patient/caregiver verbalize understanding of patient care needs?: Yes  Were patient/caregiver advised of the risks associated with not following Treatment Team discharge recommendations?: Yes    Contacts  Patient Contacts: Navya Madsen, wife  Relationship to Patient[de-identified] Family  Contact Method: Phone, In Person  Phone Number: [phone]  Reason/Outcome: Continuity of Care, Emergency Contact, Discharge [address]         Is the patient interested in [address] at discharge?: Yes  [address] [facility] requested[de-identified] [facility] Name[de-identified] [place] Provider[de-identified] PCP  Home Health Services Needed[de-identified] Wound/Ostomy Care  Homebound Criteria Met[de-identified] Uses an Assist Device (i.e. cane, walker, etc), Requires the Assistance of Another Person for Safe Ambulation or to Leave the Home  Supporting Clincal Findings[de-identified] Limited Endurance, Fatigues Easliy in [employer]                   Treatment Team Recommendation: Other (TBD)  Discharge Destination Plan[de-identified] Other (TBD -- awaiting recommendations)  Transport at Discharge : Family                   Patient/caregiver received discharge checklist.  Content reviewed. Patient/caregiver encouraged to participate in discharge plan of care prior to discharge home.   CM reviewed d/c planning process including the following: identifying help at home, patient preference for d/c planning needs, Discharge Lounge, [facility] Meds to Bed program, availability of treatment team to discuss questions or concerns patient and/or family may have regarding understanding medications and recognizing signs and symptoms once discharged. CM also encouraged patient to follow up with all recommended appointments after discharge. Patient advised of importance for patient and family to participate in managing patient’s medical well being. Additional Comments: Introduced self and role to patient and wife at bedside. Patient is normally independent, is current with [facility] for nursing, return referral placed. CM will continue to follow for additional discharge needs and place referrals as appropriate.

## 2023-06-15 NOTE — CONSULTS
[address] 81 y.o. male MRN: [id number]  Unit/Bed#: ED 16 Encounter: [id number]    ASSESSMENT and PLAN:  Jose Boone is a 81 y.o. male who was admitted to [address] [place] [address] after presenting with generalized weakness. A renal consultation is requested today for assistance in the management of acute kidney injury.     # Oligoanuric acute kidney injury complicated with severe hyperkalemia and metabolic acidosis  Etiology most likely ATN in setting of sepsis from a urinary source +/- hemodynamic changes from diuretic use  Baseline creatinine appears to be around 1.3-1.6  Episode of REMA in 10/2022 with peak creatinine of 2.67 and thought to be due to contrast nephropathy  Admission creatinine 8.70, follow-up creatinine 8.58 06/14/2023  Urinalysis moderate blood, 3+ protein, 10-20 RBC, innumerable WBC, bacteriuria  Renal imaging: Pending at this time  Check CK to rule out rhabdomyolysis  Status post temporizing measures with IV insulin/IV dextrose  Currently receiving IV fluids, start IV sodium bicarbonate  Give IV Lasix 80 mg x 1  Monitor urine output  If remains oligoanuric without improvement in serum potassium, would start renal replacement therapy  Consent obtained from patient and he is agreeable for renal replacement therapy if needed    # Severe hyperkalemia  This is due to severe acute kidney injury  Status post IV calcium, temporizing measures with IV insulin/IV dextrose and albuterol inhalation  Continue IV fluid resuscitation, start IV fluids with sodium bicarbonate  Give IV Lasix 80 mg x 1 for kaliuresis  Maintain Combs catheter and monitor urine output  Low threshold to start renal replacement therapy if serum potassium does not improve    # Anion gap metabolic acidosis  This is due to lactic acidosis in setting of sepsis  Continue IV fluid resuscitation per sepsis protocol    # Hyponatremia  This is due to severe acute kidney injury  Fluid restriction 1200 cc per 24 hours    # History of diastolic heart failure  LVEF 60%, 05/2022  He has chronic appearing bilateral lower extremity edema but currently appears intravascularly volume depleted  Continue to hold diuretics and give IV fluid challenge per sepsis protocol    # Sepsis  Most likely urinary tract infection  Currently on broad-spectrum antibiotics  CT abdomen is currently being performed to rule out intra-abdominal infection/abscess    Discussed with ED team and ICU team.  After discussion, we agreed that if patient remains oligoanuric despite IV fluid resuscitation and temporizing measures for hyperkalemia, he will need initiation of renal replacement therapy. HISTORY OF PRESENT ILLNESS:  Requesting Physician: Holger Laurent DO  Reason for Consult: Acute kidney injury    Milli Harkins is a 81 y.o. male who was admitted to [facility] after presenting with generalized weakness. A renal consultation is requested today for assistance in the management of acute kidney injury. Patient was having generalized weakness for 2 days. He reports that 2 days ago he lost his appetite and since that time he has had decreased energy and felt unsteady on his feet. Patient has history of chronic kidney disease with baseline creatinine 1.3-1.6. He has history of acute kidney injury in October 2022 with peak creatinine of 2.67 that was thought to be due to ATN due to contrast nephropathy. He is currently being admitted for evaluation of ongoing weakness. Lab work showing BUN 81, creatinine 8.7, 1.64 on 06/05/2023. Potassium 7.4, sodium 130, bicarbonate 12, anion gap 16, lactic acid 9.0. ABG with primary metabolic acidosis with superimposed respiratory acidosis.     PAST MEDICAL HISTORY:  Past Medical History:   Diagnosis Date   • Anemia    • Arthritis    • Atrial fibrillation ([address])    • Basal cell carcinoma 03/22/2022    Tip of Nose   • CHF (congestive heart failure) (HCC)    • Diabetes mellitus (HCC)     Niddm   • DVT (deep vein thrombosis) in pregnancy 1966    not in pregnancy   • DVT (deep venous thrombosis) ([address]) 1966   • Dyslipidemia    • Encephalopathy acute 11/4/2022   • GERD (gastroesophageal reflux disease)    • Hyperlipidemia    • Hypertension    • Hypomagnesemia 10/19/2022   • Irregular heart beat     Afib   • Morbid obesity due to excess calories ([address])     Resolved 9/2/2014    • Pulmonary embolism ([address])    • Sepsis ([address])    • Squamous cell skin cancer 07/30/2020    Left posterior scalp   • Visual impairment        PAST SURGICAL HISTORY:  Past Surgical History:   Procedure Laterality Date   • AORTIC VALVE REPLACEMENT     • CARDIAC DEFIBRILLATOR PLACEMENT  04/2014   • CARDIAC SURGERY  02/2014    AVR   • COLONOSCOPY     • INSERT / REPLACE / REMOVE PACEMAKER     • IR ASPIRATION BAKERS CYST  3/8/2023   • IR CHOLECYSTOSTOMY TUBE CHECK/CHANGE/REPOSITION/REINSERTION/UPSIZE  10/13/2022   • IR CHOLECYSTOSTOMY TUBE CHECK/CHANGE/REPOSITION/REINSERTION/UPSIZE  10/19/2022   • IR CHOLECYSTOSTOMY TUBE CHECK/CHANGE/REPOSITION/REINSERTION/UPSIZE  10/27/2022   • IR CHOLECYSTOSTOMY TUBE CHECK/CHANGE/REPOSITION/REINSERTION/UPSIZE  11/7/2022   • IR CHOLECYSTOSTOMY TUBE CHECK/CHANGE/REPOSITION/REINSERTION/UPSIZE  11/10/2022   • IR CHOLECYSTOSTOMY TUBE CHECK/CHANGE/REPOSITION/REINSERTION/UPSIZE  12/1/2022   • IR CHOLECYSTOSTOMY TUBE CHECK/CHANGE/REPOSITION/REINSERTION/UPSIZE  1/6/2023   • IR CHOLECYSTOSTOMY TUBE CHECK/CHANGE/REPOSITION/REINSERTION/UPSIZE  1/24/2023   • IR CHOLECYSTOSTOMY TUBE CHECK/CHANGE/REPOSITION/REINSERTION/UPSIZE  3/15/2023   • IR CHOLECYSTOSTOMY TUBE PLACEMENT  9/1/2022   • IR DRAINAGE TUBE CHECK AND/OR REMOVAL  3/22/2023   • IR DRAINAGE TUBE CHECK AND/OR REMOVAL  4/10/2023   • IR DRAINAGE TUBE PLACEMENT  4/6/2023   • JOINT REPLACEMENT Left     LTKR   • MOHS SURGERY  07/30/2020    Left posterior scalp, Dr. Erna Barcenas   • MOHS SURGERY  04/18/2022    BCC Tip of Nose- Dr. Erna Barcenas   • HI LIGJ DIVJ&STRIP LONG [address] [facility] Cb Jeffries Right 8/17/2018    Procedure: LEG PERFORATED INJECTION SCLEROTHERAPY;  Surgeon: Demetrius Arredondo MD;  Location: [facility];  Service: Vascular   • REPLACEMENT TOTAL KNEE Right    • TOTAL KNEE ARTHROPLASTY Left    • VASCULAR SURGERY     • VENA CAVA FILTER PLACEMENT      Interruption inferior vena cava, Durand filter, placement   • WISDOM TOOTH EXTRACTION         ALLERGIES:  Allergies   Allergen Reactions   • Tramadol Other (See Comments)     intolerance       SOCIAL HISTORY:  Social History     Substance and Sexual Activity   Alcohol Use Not Currently    Comment: no alcohol in 28 yrs     Social History     Substance and Sexual Activity   Drug Use Never     Social History     Tobacco Use   Smoking Status Never   Smokeless Tobacco Never       FAMILY HISTORY:  Family History   Problem Relation Age of Onset   • Arthritis Mother    • Stroke Mother    • Arthritis Father    • Cancer Father    • Arthritis Daughter        MEDICATIONS:    Current Facility-Administered Medications:   •  furosemide (LASIX) injection 80 mg, 80 mg, Intravenous, Once, [facility], DO  •  sodium bicarbonate 150 mEq in dextrose 5 % 1,000 mL infusion, 150 mL/hr, Intravenous, Continuous, Stew Johnston DO  •  sodium chloride 0.9 % bolus 1,000 mL, 1,000 mL, Intravenous, Once, [facility], DO, Last Rate: 1,000 mL/hr at 06/14/23 2026, 1,000 mL at 06/14/23 2026  •  vancomycin (VANCOCIN) 1500 mg in sodium chloride 0.9% 250 mL IVPB, 15 mg/kg (Adjusted), Intravenous, Once, [facility], DO, 1,500 mg at 06/14/23 2045    Current Outpatient Medications:   •  Acetaminophen (TYLENOL EXTRA STRENGTH PO), Take 500 mg by mouth if needed (for pain as needed).  Indications: pain as needed, Disp: , Rfl:   •  atorvastatin (LIPITOR) 40 mg tablet, Take 40 mg by mouth daily after dinner Atorvastatin Calcium 40 MG Oral Tablet Take 1 tablet daily  Refills: 0  Active , Disp: , Rfl:   •  B Complex-C (SUPER B COMPLEX PO), Take 1 capsule by mouth daily , Disp: , Rfl:   •  Cholecalciferol 25 MCG (1000 UT) capsule, Take 3,000 Units by mouth daily. Indications: Vitamin D Deficiency, Disp: , Rfl:   •  Diclofenac Sodium (VOLTAREN) 1 %, Apply 2 g topically 4 (four) times a day, Disp: , Rfl:   •  fluticasone (FLONASE) 50 mcg/act nasal spray, 2 sprays into each nostril daily as needed Shake liquid and spray, Disp: , Rfl:   •  gabapentin (NEURONTIN) 100 mg capsule, Take 100 mg by mouth daily, Disp: , Rfl:   •  Iron Combinations (NIFEREX) TABS, Take 1 tablet by mouth in the morning, Disp: , Rfl: 5  •  lubiprostone (AMITIZA) 24 mcg capsule, Take 24 mcg by mouth 2 (two) times a day with meals. Indications: Chronic Constipation of Unknown Cause, Disp: , Rfl:   •  metFORMIN (GLUCOPHAGE) 1000 MG tablet, Take 1,000 mg by mouth daily with dinner. Indications: Type 2 Diabetes, Disp: , Rfl:   •  Multiple Vitamin (MULTIVITAMINS PO), Take 1 tablet by mouth daily, Disp: , Rfl:   •  omeprazole (PriLOSEC) 20 mg delayed release capsule, Omeprazole 20 MG Oral Tablet Delayed Release Take 1 tablet daily  Refills: 0  Active, Disp: , Rfl:   •  polyethylene glycol (GLYCOLAX) 17 GM/SCOOP powder, Take 17 g by mouth 2 (two) times a day, Disp: , Rfl:   •  Potassium Chloride ER 20 MEQ TBCR, Take 20 mEq by mouth in the morning. Indications: Low Amount of Potassium in the Blood, Disp: , Rfl:   •  senna-docusate sodium (SENOKOT S) 8.6-50 mg per tablet, Take 1 tablet by mouth daily at bedtime, Disp: 30 tablet, Rfl: 0  •  sodium hypochlorite (DAKIN'S HALF-STRENGTH) external solution, Apply 1 application.  topically daily At each dressing change, Disp: 473 mL, Rfl: 0  •  sotalol (BETAPACE) 80 mg tablet, Take 1 tablet (80 mg total) by mouth daily, Disp: 60 tablet, Rfl: 5  •  torsemide 40 MG TABS, Take 40 mg by mouth 2 (two) times a day, Disp: 60 tablet, Rfl: 0  •  warfarin (COUMADIN) 2 mg tablet, Take 2 tablets (4 mg total) by mouth daily Acknowledge (Patient taking differently: Take 4 mg by mouth daily. 6.7.23   Coumadin 4 mg daily. PT.INR  6.13.23. Indications: Atrial Fibrillation), Disp: 14 tablet, Rfl: 0    Facility-Administered Medications Ordered in Other Encounters:   •  cefTRIAXone (ROCEPHIN) 2,000 mg in dextrose 5 % 50 mL IVPB, 2,000 mg, Intravenous, Once, Patrick Hughes MD  •  sodium chloride 0.9 % infusion, 75 mL/hr, Intravenous, Continuous, Patrick Hughes MD, Stopped at 05/26/23 1426    REVIEW OF SYSTEMS:  Review of Systems   Constitutional: Positive for appetite change and fatigue. Negative for chills and fever. HENT: Negative for ear pain and sore throat. Eyes: Negative for pain and visual disturbance. Respiratory: Negative for cough and shortness of breath. Cardiovascular: Negative for chest pain and palpitations. Gastrointestinal: Negative for abdominal pain and vomiting. Genitourinary: Negative for dysuria and hematuria. Musculoskeletal: Negative for arthralgias and back pain. Skin: Negative for color change and rash. Neurological: Negative for seizures and syncope. All other systems reviewed and are negative. PHYSICAL EXAM:  Current Weight:    First Weight:    Vitals:    06/14/23 1915 06/14/23 1930 06/14/23 2000 06/14/23 2045   BP: 105/57  103/56 102/52   BP Location: Right arm  Right arm Right arm   Pulse: 62 60 (!) 54 (!) 52   Resp: 16 19 14 15   Temp: (!) 97.2 °F (36.2 °C) (!) 97.2 °F (36.2 °C) (!) 97.2 °F (36.2 °C) (!) 97.2 °F (36.2 °C)   TempSrc:  Bladder     SpO2: 99% 98% 98% 98%       Intake/Output Summary (Last 24 hours) at 6/14/2023 2116  Last data filed at 6/14/2023 2045  Gross per 24 hour   Intake 50 ml   Output --   Net 50 ml     Physical Exam  Constitutional:       Appearance: Normal appearance. HENT:      Head: Normocephalic and atraumatic. Mouth/Throat:      Mouth: Mucous membranes are moist.      Pharynx: Oropharynx is clear. Cardiovascular:      Rate and Rhythm: Normal rate and regular rhythm. Pulses: Normal pulses.       Heart sounds: Normal heart sounds. Pulmonary:      Effort: Pulmonary effort is normal.      Breath sounds: Normal breath sounds. Abdominal:      General: Bowel sounds are normal.      Palpations: Abdomen is soft. Musculoskeletal:         General: Normal range of motion. Right lower leg: Edema present. Left lower leg: Edema present. Skin:     General: Skin is warm and dry. Neurological:      General: No focal deficit present. Mental Status: He is alert and oriented to person, place, and time. Mental status is at baseline. Psychiatric:         Mood and Affect: Mood normal.         Behavior: Behavior normal.         Thought Content: Thought content normal.         Judgment: Judgment normal.       Invasive Devices:   Urethral Catheter Temperature probe 16 Fr. (Active)   Reasons to continue Urinary Catheter  Accurate I&O assessment in critically ill patients (48 hr. max) 06/14/23 1928     Lab Results:   Results from last 7 days   Lab Units 06/14/23  1851 06/14/23  1520   ALK PHOS U/L  --  105   ALT U/L  --  19   AST U/L  --  28   BUN mg/dL 79* 81*   CALCIUM mg/dL 9.4 9.2   CHLORIDE mmol/L 101 102   CO2 mmol/L 10* 12*   CREATININE mg/dL 8.58* 8.70*   HEMATOCRIT %  --  31.7*   HEMOGLOBIN g/dL  --  9.4*   POTASSIUM mmol/L 8.1* 7.4*   MAGNESIUM mg/dL  --  2.1   PLATELETS Thousands/uL  --  274   WBC Thousand/uL  --  6.08     Portions of the record may have been created with voice recognition software. Occasional wrong word or "sound a like" substitutions may have occurred due to the inherent limitations of voice recognition software. Read the chart carefully and recognize, using context, where substitutions have occurred. If you have any questions, please contact the dictating provider.

## 2023-06-15 NOTE — PROCEDURES
POC Cardiac US    Date/Time: 6/15/2023 3:20 AM    Performed by: Melissa Montero MD  Authorized by: Melissa Montero MD    Patient location:  ICU  Other Assisting Provider: No    Procedure details:     Exam Type:  Diagnostic    Indications: hypotension and suspected volume depletion      Assessment / Evaluation for: cardiac function and intravascular volume status      Exam Type: initial exam      Image quality: diagnostic      Image availability:  Images available in PACS and video obtained  Patient Details:     Cardiac Rhythm:  Regular    Medications: norepinephrine infusion      Mechanical ventilation: No    Cardiac findings:     Echo technique: limited 2D      Views obtained: parasternal long axis, parasternal short axis and subcostal      Pericardial effusion: absent      Tamponade physiology: absent      Wall motion: normal      LV systolic function: normal      RV dilation: none    IVC findings:     IVC Size: dilated      IVC Inspiratory Collapse: normal    Interpretation:     Fluid Status:  Euvolemic

## 2023-06-15 NOTE — PROCEDURES
HEMODIALYSIS PROCEDURE NOTE  The patient was seen and examined on hemodialysis. Time: 2 hours  Sodium: 135 Blood flow: 300   Dialyzer: F160 Potassium: 2 K, use 1 K if repeat K >6 Dialysate flow: 500   Access: Rt femoral line Bicarbonate: 35 Ultrafiltration goal: Even   Medications on HD: Levophed     After 2-hour session of hemodialysis, we will transition to CVVHD with following prescription:    Therapy Fluid (Dialysate) ml/hr: 3000 ml/hr   Blood Flow Rate (mL/min) 250ml/min   Fluid Balance (mL/hour) Even     Dialysate fluid will be decided based on the results of most recent serum potassium.

## 2023-06-15 NOTE — PLAN OF CARE
Emergent hemodialysis treatment for hyperkalemia. Using a 1 K+ bath for most recent potassium 6.9. Fluid goal 500 ml to run even. Dr. Nelly Ahuja present for initiation.           Post-Dialysis RN Treatment Note    Blood Pressure:  Pre 110/36 mm/Hg  Post 108/44 mmHg   EDW:   TBD   Weight:    none   Volume Removed:   500 ml/run even net   Treatment duration:   120 minutes    NS given:   none   Treatment shortened:   no   Medications given during Rx:   none   Estimated Kt/V:   none   Access type:    Right femoral temporary catheter   Access Issues:    Maintains 300 bfr   Report called to primary nurse:   Verbal at bedside to [facility] RN            Problem: METABOLIC, FLUID AND ELECTROLYTES - ADULT  Goal: Electrolytes maintained within normal limits  Description: INTERVENTIONS:  - Monitor labs and assess patient for signs and symptoms of electrolyte imbalances  - Administer electrolyte replacement as ordered  - Monitor response to electrolyte replacements, including repeat lab results as appropriate  - Instruct patient on fluid and nutrition as appropriate  Outcome: Progressing  Goal: Fluid balance maintained  Description: INTERVENTIONS:  - Monitor labs   - Monitor I/O and WT  - Instruct patient on fluid and nutrition as appropriate  - Assess for signs & symptoms of volume excess or deficit  Outcome: Progressing

## 2023-06-15 NOTE — PROCEDURES
Central Line Insertion    Date/Time: 6/14/2023 11:02 PM    Performed by: Juan Scott DO  Authorized by: Juan Scott DO    Patient location:  Bedside  Consent:     Consent obtained:  Verbal    Consent given by:  Patient    Risks discussed:  Arterial puncture, pneumothorax, bleeding and infection  Universal protocol:     Immediately prior to procedure, a time out was called: yes      Patient identity confirmed:  Arm band and verbally with patient  Pre-procedure details:     Hand hygiene: Hand hygiene performed prior to insertion      Sterile barrier technique: All elements of maximal sterile technique followed      Skin preparation:  2% chlorhexidine    Skin preparation agent: Skin preparation agent completely dried prior to procedure    Indications:     Central line indications comment:  Pacer  Anesthesia (see MAR for exact dosages): Anesthesia method:  Local infiltration    Local anesthetic:  Lidocaine 1% w/o epi  Procedure details:     Location:  Right internal jugular    Vessel type: vein      Laterality:  Right    Approach: percutaneous technique used      Patient position:  Flat    Procedural supplies: introducer 6 f. Landmarks identified: yes      Ultrasound guidance: yes      Ultrasound image availability:  Images available in PACS    Sterile ultrasound techniques: Sterile gel and sterile probe covers were used      Manometry confirmation: no      Number of attempts:  1    Successful placement: yes    Post-procedure details:     Post-procedure:  Dressing applied and line sutured    Assessment:  Blood return through all ports    Post-procedure complications: none      Patient tolerance of procedure:   Tolerated well, no immediate complications  Comments:      Procedure does not include cc time

## 2023-06-15 NOTE — QUICK NOTE
Patient transferred to ICU. Now with bradycardia. Remains anuric at this time. Given serum potassium of more than 8 with heart rhythm abnormalities with bradycardia, would suggest initiation of hemodialysis. We will aim for 2-hour session of hemodialysis as this will help lower serum potassium quickly followed by CVVHD. Consent obtained from patient's wife.

## 2023-06-16 NOTE — CONSULTS
Vancomycin IV Pharmacy-to-Dose Consultation    Oliver Mcdonald is a 81 y.o. male who was receiving Vancomycin IV with management by the Pharmacy Consult service for treatment of Urinary tract infection (goal -600, trough >10). The patient’s Vancomycin therapy has been discontinued. Thank you for allowing us to take part in this patient's care. Pharmacy will sign-off now; please call or re-consult if there are any questions.         Leda Thorpe, PharmD, [address] [place] [address] Clinical Pharmacist  [phone]

## 2023-06-16 NOTE — PROCEDURES
Temporary HD Catheter    Date/Time: 6/15/2023 7:06 PM    Performed by: Floridalma Goldman MD  Authorized by: Floridalma Goldman MD    Patient location:  Bedside  Consent:     Consent obtained:  Verbal    Consent given by:  Patient    Risks discussed:  Arterial puncture, incorrect placement, nerve damage, pneumothorax, infection and bleeding    Alternatives discussed:  No treatment  Universal protocol:     Procedure explained and questions answered to patient or proxy's satisfaction: yes      Relevant documents present and verified: yes      Site/side marked: yes      Patient identity confirmed:  Verbally with patient  Pre-procedure details:     Hand hygiene: Hand hygiene performed prior to insertion      Sterile barrier technique: All elements of maximal sterile technique followed      Skin preparation:  2% chlorhexidine    Skin preparation agent: Skin preparation agent completely dried prior to procedure    Indications:     Central line indications: medications requiring central line and dialysis      Site selection rationale:  Already with multiple lines in RIJ, Femoral region  Sedation:     Sedation type: Anxiolysis (versed)  Anesthesia (see MAR for exact dosages): Anesthesia method:  Local infiltration    Local anesthetic:  Lidocaine 1% w/o epi  Procedure details:     Location:  Left internal jugular    Vessel type: vein      Laterality:  Left    Catheter size: 13F. Catheter length:  20 cm    Landmarks identified: yes      Ultrasound guidance: yes      Ultrasound image availability:  Still images obtained    Sterile ultrasound techniques: Sterile gel and sterile probe covers were used      Number of attempts:  1    Vessel of catheter tip end:  SVC  Post-procedure details:     Post-procedure:  Dressing applied and line sutured    Assessment:  No pneumothorax on x-ray and placement verified by x-ray    Patient tolerance of procedure:   Tolerated with difficulty  Comments:      Line advanced easily and intially had good venous return through both ports, however large clots developed in line as wire was pulled. After intially good blood return, no ability to draw in blue port after wire removed. Then no return through red port. Did not attempt to flush. Line was sutured as patient was very anxious, but nurse was instructed to not use ports or line.

## 2023-06-16 NOTE — PROGRESS NOTES
Procedure Note - Nephrology   Cassandra Cummings 81 y.o. male MRN: [id number]  [facility]: [id number]      Assessment / Plan:  1. Oligoanuric acute kidney injury complicated by severe hyperkalemia and metabolic acidosis  -REMA likely due to septic ATN from urinary source plus or minus diuretic use plus or minus contrast associated nephropathy  - Status post IV dye with CT abdomen and pelvis on June 7  - Patient status post emergent hemodialysis therapy June 14, 2023 for hyperkalemia  - CRRT initiated, patient on 4K/3 calcium bath with even UF as patient hemodynamically unstable on pressors - will continue this  - Continue to monitor serial labs  -Patient seen and examined by me on CRRT today  - Continue to monitor for signs of renal recovery  - Combs in place  - CK level normal  - Baseline serum creatinine previously 1.3-1.6     2. Severe hyperkalemia in the setting of severe REMA- resolved status post emergent HD treatment as well as CRRT initiation. Potassium improving, continue 4K bath     3. Anion gap acidosis in the setting of lactic acidosis-improving with CRRT and bicarb gtt. May stop bicarb gtt once bicarb normal range or > 24. Lactate improving     4. Hyponatremia-likely dilutional in the setting of REMA and decreased free water excretion, continue to monitor serial labs, monitor on CRRT     5.  Hyperphosphatemia-Phos 7.7-->3.3, resolved on CRRT, monitor this     6. Hypomagnesemia-resolved s/p repletion     7. 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 GI consulted for melena. No urgent plans for EGD     8. Hypotension in the setting of septic shock-continue pressors per ICU team, maintain MAP greater than 65 for renal perfusion     9.   Sepsis likely due to UTI-on antibiotics per ICU team, CT does not show acute abdominal or pelvic pathology, small abdominal and pelvic ascites noted with unchanged bilateral perinephric and periureteral fat stranding as well as small to moderate right pleural effusion     10. History of diastolic CHF-monitor volume status closely on bicarbonate drip, diuretics on hold in light of REMA, monitor daily weight        Subjective:   Patient seen and examined by me on CRRT at approximately 11:30 AM.  Blood pressure 129/47, UF goal even as increased pressor requirement this morning. On Levophed and vasopressin drips. Also on heparin and bicarbonate drips. Objective:     Vitals: Blood pressure 123/66, pulse 70, temperature 97.9 °F (36.6 °C), resp. rate 22, height 6' 1" (1.854 m), weight (!) 137 kg (303 lb), SpO2 94 %. ,Body mass index is 39.98 kg/m². Temp (24hrs), Av.9 °F (36.6 °C), Min:97.2 °F (36.2 °C), Max:99.3 °F (37.4 °C)      Weight (last 2 days)     Date/Time Weight    06/15/23 1034 137 (303)    06/15/23 0549 138 (303.79)            Intake/Output Summary (Last 24 hours) at 2023 1338  Last data filed at 2023 1200  Gross per 24 hour   Intake 6273.7 ml   Output 2203 ml   Net 4070.7 ml     I/O last 24 hours: In: 8248.8 [P.O.:60; I.V.:7166.7; IV Piggyback:1022.1]  Out: 8506 [Urine:195; Other:3648]        Physical Exam:   Physical Exam  Vitals reviewed. Constitutional:       General: He is not in acute distress. Appearance: He is well-developed. He is ill-appearing. He is not diaphoretic. HENT:      Head: Normocephalic and atraumatic. Nose: Nose normal.      Mouth/Throat:      Mouth: Mucous membranes are moist.      Pharynx: No oropharyngeal exudate. Eyes:      General: No scleral icterus. Right eye: No discharge. Left eye: No discharge. Comments: eyeglasses   Neck:      Thyroid: No thyromegaly. Cardiovascular:      Rate and Rhythm: Normal rate and regular rhythm.       Heart sounds: Normal heart sounds. Pulmonary:      Effort: Pulmonary effort is normal.      Breath sounds: Normal breath sounds. No wheezing or rales. Abdominal:      General: Bowel sounds are normal. There is no distension. Palpations: Abdomen is soft. Tenderness: There is no abdominal tenderness. Genitourinary:     Comments: alfred  Musculoskeletal:         General: No swelling. Normal range of motion. Cervical back: Neck supple. Lymphadenopathy:      Cervical: No cervical adenopathy. Skin:     General: Skin is warm and dry. Findings: No rash. Neurological:      General: No focal deficit present. Mental Status: He is alert.       Comments: awake   Psychiatric:         Mood and Affect: Mood normal.         Behavior: Behavior normal.         Invasive Devices     Peripheral Intravenous Line  Duration           Peripheral IV 06/14/23 Left Antecubital 1 day    Peripheral IV 06/14/23 Right;Ventral (anterior) Forearm 1 day          Arterial Line  Duration           Arterial Line 06/14/23 Radial 1 day          Hemodialysis Catheter  Duration           HD Temporary Double Catheter 2 days          Drain  Duration           Urethral Catheter Temperature probe 16 Fr. 1 day                Medications:    Scheduled Meds:  Current Facility-Administered Medications   Medication Dose Route Frequency Provider Last Rate   • acetaminophen  650 mg Oral Q6H PRN Tommy March MD     • cefepime  2,000 mg Intravenous Q12H Berna Tomas PA-C 2,000 mg (06/16/23 0618)   • chlorhexidine  15 mL Mouth/Throat Q12H [address], MD     • cholecalciferol  1,000 Units Oral Daily Laura Page MD     • fludrocortisone  0.05 mg Oral Daily Berna Tomas PA-C     • heparin (porcine)  3-20 Units/kg/hr (Order-Specific) Intravenous Titrated Het D Herrera, DO 11.1 Units/kg/hr (06/16/23 3514)   • heparin (porcine)  2,000 Units Intravenous Q6H PRN Het D Herrera, DO     • heparin (porcine)  4,000 Units Intravenous Q6H PRN Het D Dulatesha Ayers, DO     • insulin lispro  2-12 Units Subcutaneous Q6H [address] Het D Herrera, DO     • lidocaine  1 patch Topical Daily Bonifacio Vital MD     • methylPREDNISolone sodium succinate  20 mg Intravenous Q12H [address] Susana Vallejo PA-C     • norepinephrine  1-30 mcg/min Intravenous Titrated Yvonne Granda MD 15 mcg/min (06/16/23 1224)   • NxStage K 4/Ca 3  20,000 mL Dialysis Continuous Wendy Torres MD     • ondansetron  4 mg Intravenous Q6H PRN Kadeem Chambers PA-C     • pantoprazole  40 mg Intravenous Q12H [address] Het D Herrera, DO     • sodium bicarbonate 150 mEq in dextrose 5 % 1,000 mL infusion  150 mL/hr Intravenous Continuous Luiz Males,  mL/hr (06/16/23 2138)   • vasopressin  0.04 Units/min Intravenous Continuous Yvonne Granda MD 0.04 Units/min (06/16/23 0746)     Facility-Administered Medications Ordered in Other Encounters   Medication Dose Route Frequency Provider Last Rate   • cefTRIAXone  2,000 mg Intravenous Once Lin Marshall MD     • sodium chloride  75 mL/hr Intravenous Continuous Lin Marshall MD Stopped (05/26/23 1426)       PRN Meds:.•  acetaminophen  •  heparin (porcine)  •  heparin (porcine)  •  ondansetron    Continuous Infusions:heparin (porcine), 3-20 Units/kg/hr (Order-Specific), Last Rate: 11.1 Units/kg/hr (06/16/23 0821)  norepinephrine, 1-30 mcg/min, Last Rate: 15 mcg/min (06/16/23 1224)  NxStage K 4/Ca 3, 20,000 mL  sodium bicarbonate 150 mEq in dextrose 5 % 1,000 mL infusion, 150 mL/hr, Last Rate: 150 mL/hr (06/16/23 3745)  vasopressin, 0.04 Units/min, Last Rate: 0.04 Units/min (06/16/23 0746)            LAB RESULTS:      Results from last 7 days   Lab Units 06/16/23  1206 06/16/23  0550 06/16/23  0103 06/15/23  2010 06/15/23  1949 06/15/23  1810 06/15/23  1201 06/15/23  0526 06/15/23  0503 06/15/23  0233 06/15/23  0032 06/14/23  2249 06/14/23  2235 06/14/23  1851 06/14/23  1520   WBC Thousand/uL  --  13.25* 14.99* 14.86*  --   --   --  11.24*  --  9.19  --   -- 6.14  --  6.08   HEMOGLOBIN g/dL  --  8.2* 7.9* 8.7*  --   --   --  7.8*  --  8.2*  --   --  8.4*  --  9.4*   I STAT HEMOGLOBIN g/dl  --   --   --   --  8.5*  --   --   --   --   --   --  8.8*  --   --   --    HEMATOCRIT %  --  25.7* 25.4* 28.3*  --   --   --  26.0*  --  26.3*  --   --  27.6*  --  31.7*   HEMATOCRIT, ISTAT %  --   --   --   --  25*  --   --   --   --   --   --  26*  --   --   --    PLATELETS Thousands/uL  --  123* 138* 171  --   --   --  180  --  203  --   --  266  --  274   NEUTROS PCT %  --   --   --  91*  --   --   --  82*  --  82*  --   --  67  --  66   LYMPHS PCT %  --   --   --  5*  --   --   --  8*  --  10*  --   --  19  --  22   LYMPHO PCT %  --   --  1*  --   --   --   --   --   --   --   --   --   --   --   --    MONOS PCT %  --   --   --  3*  --   --   --  8  --  7  --   --  12  --  10   MONO PCT %  --   --  0*  --   --   --   --   --   --   --   --   --   --   --   --    EOS PCT %  --   --  0 0  --   --   --  1  --  0  --   --  0  --  0   POTASSIUM mmol/L 4.7 5.1 5.1 5.4*  --  5.1 5.1  --  5.2 5.3   < >  --  6.9*   < > 7.4*   CHLORIDE mmol/L 103 101 101 100  --  101 101  --  100 98   < >  --  104   < > 102   CO2 mmol/L 21 20* 20* 18*  --  19* 17*  --  17* 15*   < >  --  11*   < > 12*   CO2, I-STAT mmol/L  --   --   --   --  23  --   --   --   --   --   --  12*  --   --   --    BUN mg/dL 30* 33* 33* 34*  --  33* 40*  --  54* 60*   < >  --  80*   < > 81*   CREATININE mg/dL 3.07* 3.35* 3.72* 3.89*  --  3.46* 4.33*  --  5.55* 6.06*   < >  --  8.19*   < > 8.70*   CALCIUM mg/dL 8.7 9.1 9.0 9.1  --  9.1 9.2  --  8.8 8.9   < >  --  9.5   < > 9.2   ALK PHOS U/L  --   --   --  101  --   --   --   --   --   --   --   --  92  --  105   ALT U/L  --   --   --  30  --   --   --   --   --   --   --   --  20  --  19   AST U/L  --   --   --  58*  --   --   --   --   --   --   --   --  33  --  28   GLUCOSE, ISTAT mg/dl  --   --   --   --  194*  --   --   --   --   --   --  91  --   --   --    MAGNESIUM mg/dL 2.3 2.4 2.4 2.3  --  2.1 2.2  --  1.5* 1.4*   < >  --  1.9  --  2.1   PHOSPHORUS mg/dL 3.3 3.7 3.9 4.5*  --  3.1 3.7  --  5.1* 5.8*   < >  --  7.7*  --   --     < > = values in this interval not displayed. CUTURES:  Lab Results   Component Value Date    BLOODCX No Growth at 24 hrs. 06/14/2023    BLOODCX No Growth at 24 hrs. 06/14/2023    BLOODCX No Growth After 5 Days. 04/04/2023    BLOODCX No Growth After 5 Days. 04/04/2023    BLOODCX No Growth After 5 Days. 11/04/2022    BLOODCX No Growth After 5 Days. 11/04/2022    BLOODCX No Growth After 5 Days. 10/21/2022    BLOODCX No Growth After 5 Days. 10/21/2022    URINECX (A) 06/14/2023     50,000-59,000 cfu/ml Gram Negative Miquel Enteric Like    URINECX No Growth <1000 cfu/mL 07/09/2019    URINECX >100,000 cfu/ml Escherichia coli 05/19/2017    URINECX No Growth <1000 cfu/mL 04/20/2017    URINECX 7462-4450 cfu/ml Gram Negative Miquel 04/02/2017                 Portions of the record may have been created with voice recognition software. Occasional wrong word or "sound a like" substitutions may have occurred due to the inherent limitations of voice recognition software. Read the chart carefully and recognize, using context, where substitutions have occurred. If you have any questions, please contact the dictating provider.

## 2023-06-16 NOTE — RESPIRATORY THERAPY NOTE
RT Protocol Note  Clarisse Arce 81 y.o. male MRN: [id number]  Unit/Bed#: [facility] 13 Encounter: [id number]    Assessment    Active Problems:    Acute on chronic anemia    CKD (chronic kidney disease) stage 3, GFR 30-59 ml/min (HCC)    Acute kidney injury superimposed on stage 3 chronic kidney disease     Hypomagnesemia    CHF (congestive heart failure) (HCC)    Acidosis    Hyperkalemia    Hyponatremia    Hyperphosphatemia      Home Pulmonary Medications:  NA       Past Medical History:   Diagnosis Date    Anemia     Arthritis     Atrial fibrillation ([address])     Basal cell carcinoma 03/22/2022    Tip of Nose    CHF (congestive heart failure) (HCC)     Diabetes mellitus (HCC)     Niddm    DVT (deep vein thrombosis) in pregnancy 1966    not in pregnancy    DVT (deep venous thrombosis) ([address]) 1966    Dyslipidemia     Encephalopathy acute 11/4/2022    GERD (gastroesophageal reflux disease)     Hyperlipidemia     Hypertension     Hypomagnesemia 10/19/2022    Irregular heart beat     Afib    Morbid obesity due to excess calories ([address])     Resolved 9/2/2014     Pulmonary embolism (HCC)     Sepsis (HCC)     Squamous cell skin cancer 07/30/2020    Left posterior scalp    Visual impairment      Social History     Socioeconomic History    Marital status: /Civil Union     Spouse name: None    Number of children: None    Years of education: None    Highest education level: None   Occupational History    None   Tobacco Use    Smoking status: Never    Smokeless tobacco: Never   Vaping Use    Vaping Use: Never used   Substance and Sexual Activity    Alcohol use: Not Currently     Comment: no alcohol in 28 yrs    Drug use: Never    Sexual activity: Yes     Partners: Female     Birth control/protection: None   Other Topics Concern    None   Social History Narrative    None     Social Determinants of Health     Financial Resource Strain: Not on file   Food Insecurity: No Food Insecurity (6/15/2023)    Hunger Vital Sign     Worried About Running Out of Food in the Last Year: Never true     Ran Out of Food in the Last Year: Never true   Transportation Needs: No Transportation Needs (6/15/2023)    PRAPARE - Transportation     Lack of Transportation (Medical): No     Lack of Transportation (Non-Medical): No   Physical Activity: Not on file   Stress: Not on file   Social Connections: Not on file   Intimate Partner Violence: Not on file   Housing Stability: Low Risk  (6/15/2023)    Housing Stability Vital Sign     Unable to Pay for Housing in the Last Year: No     Number of Places Lived in the Last Year: 1     Unstable Housing in the Last Year: No       Subjective         Objective    Physical Exam:   Assessment Type: Assess only  General Appearance: Sleeping  Respiratory Pattern: Shallow  Chest Assessment: Chest expansion symmetrical  Bilateral Breath Sounds: Clear    Vitals:  Blood pressure 114/58, pulse 66, temperature 97.9 °F (36.6 °C), resp. rate 16, height 6' 1" (1.854 m), weight (!) 137 kg (303 lb), SpO2 97 %. Results from last 7 days   Lab Units 06/16/23  0108   PH ART  7.334*   PCO2 ART mm Hg 36.6   PO2 ART mm Hg 71.2*   HCO3 ART mmol/L 19.0*   BASE EXC ART mmol/L -6.2   O2 CONTENT ART mL/dL 11.4*   O2 HGB, ARTERIAL % 90.6*   ABG SOURCE  Line, Arterial       Imaging and other studies: I have personally reviewed pertinent reports. Plan    Respiratory Plan: Vent/NIV/HFNC        Resp Comments: (P) Pt is a 80 y/o male admitted with hyperkalemia. Pt has in respiratory distress earlier and was placed on bipap. Pt was tranisitioned back to mid flow cannula. Breathe sounds are clear. Pt does not take any respiratory medications at home. No udns ordered at this time.

## 2023-06-16 NOTE — CONSULTS
Consult Service: Gastroenterology      PATIENT INFORMATION      Dafne Cardoza 81 y.o. male MRN: [id number]  Unit/Bed#: MICU 13 Encounter: [id number]  PCP: Wesley Juarez DO  Date of Admission:  6/14/2023  Date of Consultation: 06/16/23  Requesting Physician: Checo Costa DO       [address]   Dafne Cardoza is a 81 y.o. 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, CHF,  AVR on coumadin who presents with REMA, SIRS and started on CRRT    Gastroenterology has been consulted for assistance with management of melena    Melena  · Small bowel bleed secondary to previously seen AVM's vs right sided colonic bleed, unlikely but PUD is possible although had normal EGD of esophagus, stomach and duodenum 3 weeks ago  · Hemoglobin is at baseline   · No urgent plans for endoscopy but if hemoglobin down trends or patient becomes hypotensive with ongoing melena can plan for repeat push enteroscopy +/- colonoscopy  · INR has been reversed  · Monitor H&H  · PPI IV BID   · We will follow along         HISTORY OF PRESENT ILLNESS      Dafne Cardoza is a 81 y.o. male who is originally admitted for generalized weakness and is being consulted for melena. Patient denies abdominal pain, nausea, vomiting, heartburn, dysphagia, constipation, diarrhea. Patient with history of ongoing intermittent melena since the past month. 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 Now admitted with generalized weakness and found to have REMA requiring CVVH in the icu. REVIEW OF SYSTEMS     A thorough 12-point review of systems has been conducted. Pertinent positives and negatives are mentioned in the history of present illness.        PAST MEDICAL & SURGICAL HISTORY      Past Medical History:   Diagnosis Date   • Anemia    • Arthritis    • Atrial fibrillation [facility])    • Basal cell carcinoma 03/22/2022    Tip of Nose   • CHF (congestive heart failure) ([address])    • Diabetes mellitus ([address]) [place]   • DVT (deep vein thrombosis) in pregnancy 1966    not in pregnancy   • DVT (deep venous thrombosis) ([address]) 1966   • Dyslipidemia    • Encephalopathy acute 11/4/2022   • GERD (gastroesophageal reflux disease)    • Hyperlipidemia    • Hypertension    • Hypomagnesemia 10/19/2022   • Irregular heart beat     Afib   • Morbid obesity due to excess calories ([address])     Resolved 9/2/2014    • Pulmonary embolism (HCC)    • Sepsis ([address])    • Squamous cell skin cancer 07/30/2020    Left posterior scalp   • Visual impairment        Past Surgical History:   Procedure Laterality Date   • AORTIC VALVE REPLACEMENT     • CARDIAC DEFIBRILLATOR PLACEMENT  04/2014   • CARDIAC SURGERY  02/2014    AVR   • COLONOSCOPY     • INSERT / REPLACE / REMOVE PACEMAKER     • IR ASPIRATION BAKERS CYST  3/8/2023   • IR CHOLECYSTOSTOMY TUBE CHECK/CHANGE/REPOSITION/REINSERTION/UPSIZE  10/13/2022   • IR CHOLECYSTOSTOMY TUBE CHECK/CHANGE/REPOSITION/REINSERTION/UPSIZE  10/19/2022   • IR CHOLECYSTOSTOMY TUBE CHECK/CHANGE/REPOSITION/REINSERTION/UPSIZE  10/27/2022   • IR CHOLECYSTOSTOMY TUBE CHECK/CHANGE/REPOSITION/REINSERTION/UPSIZE  11/7/2022   • IR CHOLECYSTOSTOMY TUBE CHECK/CHANGE/REPOSITION/REINSERTION/UPSIZE  11/10/2022   • IR CHOLECYSTOSTOMY TUBE CHECK/CHANGE/REPOSITION/REINSERTION/UPSIZE  12/1/2022   • IR CHOLECYSTOSTOMY TUBE CHECK/CHANGE/REPOSITION/REINSERTION/UPSIZE  1/6/2023   • IR CHOLECYSTOSTOMY TUBE CHECK/CHANGE/REPOSITION/REINSERTION/UPSIZE  1/24/2023   • IR CHOLECYSTOSTOMY TUBE CHECK/CHANGE/REPOSITION/REINSERTION/UPSIZE  3/15/2023   • IR CHOLECYSTOSTOMY TUBE PLACEMENT  9/1/2022   • IR DRAINAGE TUBE CHECK AND/OR REMOVAL  3/22/2023   • IR DRAINAGE TUBE CHECK AND/OR REMOVAL  4/10/2023   • IR DRAINAGE TUBE PLACEMENT  4/6/2023   • JOINT REPLACEMENT Left     LTKR   • MOHS SURGERY  07/30/2020    Left posterior scalp, Dr. Sheri Romero   • MOHS SURGERY  04/18/2022    BCC Tip of Nose- Dr. Sheri Romero   • IA LIGJ DIVJ&STRIP LONG [address] [facility]. Right 8/17/2018    Procedure: LEG PERFORATED INJECTION SCLEROTHERAPY;  Surgeon: Tyree Brown MD;  Location: [facility]  MAIN OR;  Service: Vascular   • REPLACEMENT TOTAL KNEE Right    • TOTAL KNEE ARTHROPLASTY Left    • VASCULAR SURGERY     • VENA CAVA FILTER PLACEMENT      Interruption inferior vena cava, Josue filter, placement   • WISDOM TOOTH EXTRACTION           MEDICATIONS & ALLERGIES       Medications:   Prior to Admission medications    Medication Sig Start Date End Date Taking? Authorizing Provider   Acetaminophen (TYLENOL EXTRA STRENGTH PO) Take 500 mg by mouth if needed (for pain as needed). Indications: pain as needed    Historical Provider, MD   atorvastatin (LIPITOR) 40 mg tablet Take 40 mg by mouth daily after dinner Atorvastatin Calcium 40 MG Oral Tablet Take 1 tablet daily  Refills: 0  Active     Historical Provider, MD   B Complex-C (SUPER B COMPLEX PO) Take 1 capsule by mouth daily     Historical Provider, MD   Cholecalciferol 25 MCG (1000 UT) capsule Take 3,000 Units by mouth daily. Indications: Vitamin D Deficiency    Clare Eugene DO   Diclofenac Sodium (VOLTAREN) 1 % Apply 2 g topically 4 (four) times a day 11/14/22   Francia Anderson MD   fluticasone [facility]) 50 mcg/act nasal spray 2 sprays into each nostril daily as needed Shake liquid and spray 7/7/21   Historical Provider, MD   gabapentin (NEURONTIN) 100 mg capsule Take 100 mg by mouth daily 1/25/22   Historical Provider, MD   Iron Combinations (NIFEREX) TABS Take 1 tablet by mouth in the morning 2/13/18   Historical Provider, MD   lubiprostone (AMITIZA) 24 mcg capsule Take 24 mcg by mouth 2 (two) times a day with meals. Indications: Chronic Constipation of Unknown Cause    Clare Eugene DO   metFORMIN (GLUCOPHAGE) 1000 MG tablet Take 1,000 mg by mouth daily with dinner.  Indications: Type 2 Diabetes    Historical Provider, MD   Multiple Vitamin (MULTIVITAMINS PO) Take 1 tablet by mouth daily    Historical Provider, MD omeprazole (PriLOSEC) 20 mg delayed release capsule Omeprazole 20 MG Oral Tablet Delayed Release Take 1 tablet daily  Refills: 0  Active    Historical Provider, MD   polyethylene glycol (GLYCOLAX) 17 GM/SCOOP powder Take 17 g by mouth 2 (two) times a day    Historical Provider, MD   Potassium Chloride ER 20 MEQ TBCR Take 20 mEq by mouth in the morning. Indications: Low Amount of Potassium in the Blood 4/8/23   Eduardo Rutledge DO   senna-docusate sodium (SENOKOT S) 8.6-50 mg per tablet Take 1 tablet by mouth daily at bedtime 4/7/23   Мария Herrera PA-C   sodium hypochlorite (DAKIN'S HALF-STRENGTH) external solution Apply 1 application. topically daily At each dressing change 5/9/23   Jodi Bustamante DO   sotalol (BETAPACE) 80 mg tablet Take 1 tablet (80 mg total) by mouth daily 5/30/23   Ksenia Roy MD   torsemide 40 MG TABS Take 40 mg by mouth 2 (two) times a day 5/29/23   Genesis Ritchie MD   warfarin (COUMADIN) 2 mg tablet Take 2 tablets (4 mg total) by mouth daily Acknowledge  Patient taking differently: Take 4 mg by mouth daily. 6.7.23   Coumadin 4 mg daily. PT.INR  6.13.23. Indications: Atrial Fibrillation 10/24/22   Julio C King MD       Allergies:    Allergies   Allergen Reactions   • Tramadol Other (See Comments)     intolerance         SOCIAL HISTORY      Marital Status: /Civil Union    Substance Use History:   Social History     Substance and Sexual Activity   Alcohol Use Not Currently    Comment: no alcohol in 28 yrs     Social History     Tobacco Use   Smoking Status Never   Smokeless Tobacco Never     Social History     Substance and Sexual Activity   Drug Use Never         FAMILY HISTORY      Non-Contributory      PHYSICAL EXAM     Vitals:   Blood Pressure: 123/66 (06/16/23 0900)  Pulse: 70 (06/16/23 1200)  Temperature: 97.9 °F (36.6 °C) (06/16/23 1200)  Temp Source: Bladder (06/16/23 0400)  Respirations: 22 (06/16/23 1200)  Height: 6' 1" (185.4 cm) (06/15/23 1034)  Weight - Scale: (!) 137 kg (303 lb) (06/15/23 1034)  SpO2: 94 % (06/16/23 1200)    Physical Exam:   GENERAL: NAD  HEENT:  NC/AT, MMM  CARDIAC:  RRR, +S1/S2, no S3/S4 heard  PULMONARY:  CTA B/L, no wheezing/rales/rhonci, non-labored breathing  ABDOMEN:  Soft, NT/ND, +BS, no rebound/guarding/rigidity  NEUROLOGIC:  Alert/oriented x3. SKIN:  No rashes or erythema       ADDITIONAL DATA     Lab Results:     Results from last 7 days   Lab Units 06/16/23  0550 06/16/23  0103 06/15/23  2010   WBC Thousand/uL 13.25* 14.99* 14.86*   HEMOGLOBIN g/dL 8.2* 7.9* 8.7*   HEMATOCRIT % 25.7* 25.4* 28.3*   PLATELETS Thousands/uL 123* 138* 171   NEUTROS PCT %  --   --  91*   LYMPHS PCT %  --   --  5*   LYMPHO PCT %  --  1*  --    MONOS PCT %  --   --  3*   MONO PCT %  --  0*  --    EOS PCT %  --  0 0     Results from last 7 days   Lab Units 06/16/23 0550 06/16/23  0103 06/15/23  2010 06/15/23  1949   POTASSIUM mmol/L 5.1   < > 5.4*  --    CHLORIDE mmol/L 101   < > 100  --    CO2 mmol/L 20*   < > 18*  --    CO2, I-STAT mmol/L  --   --   --  23   BUN mg/dL 33*   < > 34*  --    CREATININE mg/dL 3.35*   < > 3.89*  --    CALCIUM mg/dL 9.1   < > 9.1  --    ALK PHOS U/L  --   --  101  --    ALT U/L  --   --  30  --    AST U/L  --   --  58*  --    GLUCOSE, ISTAT mg/dl  --   --   --  194*    < > = values in this interval not displayed. Results from last 7 days   Lab Units 06/16/23  0820   INR  1.94*       Imaging:    XR chest portable ICU    Result Date: 6/16/2023  Narrative: CHEST INDICATION:   line placement. COMPARISON: June 14, 2023 EXAM PERFORMED/VIEWS:  XR CHEST PORTABLE ICU FINDINGS: Right-sided line seen with tip in the SVC An additional left-sided line seen with tip at the SVC right atrial junction.  Dual-lead pacemaker with tip of lead in the right atrium right ventricle Left atrial appendage clip Cardiomegaly seen Increased lung markings seen suggest congestion Blunting of the both CP angle due to small effusion Postsurgical changes from prior median sternotomy     Impression: Left-sided line seen with tip in the SVC right atrial junction No pneumothorax Pulmonary congestion with small effusion Workstation performed: [id number]     XR chest portable ICU    Result Date: 6/15/2023  Narrative: CHEST INDICATION:   cvl. COMPARISON: 6/14/2023 at 5:57 p.m. EXAM PERFORMED/VIEWS:  XR CHEST PORTABLE ICU 11:43 p.m. FINDINGS: Infraclavicular chest wall AICD. Right jugular sheath tip in the right innominate vein. Median sternotomy. Atrial clip. aVR. Heart is top normal in size. Mild vascular and interstitial prominence suggest mild ongoing volume overload. Bibasilar atelectasis. Small bilateral effusions. No pneumothorax. Degenerative change in the spine and shoulders     Impression: Stable mild congestion and small bilateral effusions. Workstation performed: [id number]     Echo complete w/ contrast if indicated    Result Date: 6/15/2023  Narrative: •  Left Ventricle: Left ventricular cavity size is normal. Wall thickness is normal. There is moderate concentric hypertrophy. There is moderate asymmetric hypertrophy of the septal wall. The left ventricular ejection fraction is 70%. Systolic function is normal. Wall motion is normal. Diastolic function is normal. •  IVS: There is abnormal septal motion. There is diastolic flattening of the interventricular septum consistent with right ventricle volume overload. •  Right Ventricle: Right ventricular cavity size is moderately dilated. Systolic function is normal. •  Left Atrium: The atrium is moderately dilated. •  Mitral Valve: There is mild regurgitation. There is mild stenosis. The mitral valve mean gradient is 7mmHg. The mitral valve area by pressure half time is 3.01cm2. •  Tricuspid Valve: There is moderate regurgitation. The right ventricular systolic pressure is severely elevated. The estimated right ventricular systolic pressure is 02.78 mmHg. •  Aorta:  The aortic root is normal in size. The aortic root exhibited moderate fibrocalcific change. XR chest 1 view portable    Result Date: 6/15/2023  Narrative: CHEST INDICATION:   weakness. COMPARISON: Compared with 6/7/2023 EXAM PERFORMED/VIEWS:  XR CHEST PORTABLE FINDINGS: Sternotomy wires. Left chest wall pacemaker. Left atrial clip. Cardiomediastinal silhouette appears unremarkable. Poor inspiration with prominent markings. Blunted costophrenic angles bilaterally. Bibasilar prominent markings. No pneumothorax. Osseous structures appear within normal limits for patient age. Impression: Possible mild congestion and small effusions Workstation performed: [id number]     US bedside procedure    Result Date: 6/15/2023  Narrative: [id number]. [id number]    US bedside procedure    Result Date: 6/15/2023  Narrative: [id number]    US bedside procedure    Result Date: 6/15/2023  Narrative: [id number]    CT abdomen pelvis wo contrast    Result Date: 6/14/2023  Narrative: CT ABDOMEN AND PELVIS WITHOUT IV CONTRAST INDICATION:   Abdominal pain, acute, nonlocalized abd pain, renal failure, hx jeny, hx bleeding. COMPARISON: CT of the abdomen and pelvis from 6/7/2023 and CT of the chest, abdomen and pelvis from 4/4/2023 TECHNIQUE:  CT examination of the abdomen and pelvis was performed without intravenous contrast. Multiplanar 2D reformatted images were created from the source data. This examination, like all CT scans performed in the [facility], was performed utilizing techniques to minimize radiation dose exposure, including the use of iterative reconstruction and automated exposure control. Radiation dose length product (DLP) for this visit:  1310.88 mGy-cm Enteric contrast was not administered.  FINDINGS: Evaluation of visceral structures and vasculature is limited by lack of intravenous contrast. ABDOMEN LOWER CHEST: The partially imaged heart is enlarged. There is a small to moderate right pleural effusion, slightly enlarged from 6/7/2023. LIVER/BILIARY TREE:  Unremarkable unenhanced appearance of the liver. No biliary ductal dilation. GALLBLADDER:  The patient is status post cholecystectomy. SPLEEN: Unremarkable unenhanced appearance. PANCREAS:  Unremarkable unenhanced appearance. No dilation of the main pancreatic duct. ADRENAL GLANDS:  Unremarkable unenhanced appearance. KIDNEYS/URETERS: There is a 3 mm calculus in the right kidney. No ureteral calculi. No hydronephrosis. A hypodense lesion is seen in the right kidney which cannot be accurately assessed without IV contrast, however corresponds to a simple cyst on the prior CT. STOMACH AND BOWEL:  Evaluation of the gastrointestinal tract is somewhat limited by underdistention and lack of oral contrast. No bowel obstruction or convincing inflammation. <empty> APPENDIX:  The appendix is not visualized, however there are no secondary findings of appendicitis. ABDOMINOPELVIC CAVITY:  No pneumoperitoneum. Small amount of fluid in the perihepatic space and both paracolic gutters new from 6/7/2023. Nonspecific bilateral perinephric periureteral fat stranding overall similar to the prior study. No organized fluid collections to suggest an abscess. LYMPH NODES: No abdominal or pelvic lymphadenopathy. VESSELS: Normal caliber aorta. Scattered atherosclerotic calcifications in the abdominal aorta and its major branches. PELVIS REPRODUCTIVE ORGANS:  The prostate gland is not enlarged. URINARY BLADDER: The urinary bladder is collapsed around a Combs catheter, limiting its evaluation. ABDOMINAL WALL/INGUINAL REGIONS: Small umbilical hernia containing fat and some fluid. Small fat-containing bilateral inguinal hernias. Essentially unchanged anasarca and intra-abdominal wall skin thickening. MUSCULOSKELETAL:  No focal aggressive osseous lesions. Degenerative changes of the spine.  Unchanged appearance of severe compression deformity of L4 vertebral body with no retropulsion into the spinal canal. No acute fractures. Impression: No acute abdominal or pelvic pathology within limitation of a noncontrast study. Small abdominal and pelvic ascites, new from 6/7/2023. Essentially unchanged bilateral perinephric and periureteral fat stranding. No organized fluid collections to suggest an abscess. Small to moderate right pleural effusion, slightly enlarged from 6/7/2023. Multiple stable additional findings as above. Workstation performed: [id number]     XR chest 1 view    Result Date: 6/8/2023  Narrative: TRAUMA SERIES INDICATION:  TRAUMA. COMPARISON:  None VIEWS:  XR TRAUMA MULTIPLE FINDINGS: CHEST: Sternotomy wires. Left atrial clip. Pacemaker chamber. Prosthetic aortic valve. Supine frontal view of the chest is obtained. Cardiomediastinal silhouette is within normal limits accounting for technique and patient positioning. Poor inspiration. Prominent vascular interstitial markings. . Blunted costophrenic angles. . No pneumothorax is seen on this supine film. Upright images are more sensitive to detect anterior pneumothoraces if relevant. No displaced fractures. 1 view reviewed. 1 view reviewed. Impression: No acute cardiopulmonary disease within limitations of supine imaging. Workstation performed: [id number]     XR pelvis ap only 1 or 2 vw    Result Date: 6/8/2023  Narrative: TRAUMA SERIES INDICATION:  TRAUMA. COMPARISON:  None VIEWS:  XR TRAUMA MULTIPLE FINDINGS: CHEST: Sternotomy wires. Left atrial clip. Pacemaker chamber. Prosthetic aortic valve. Supine frontal view of the chest is obtained. Cardiomediastinal silhouette is within normal limits accounting for technique and patient positioning. Poor inspiration. Prominent vascular interstitial markings. . Blunted costophrenic angles. . No pneumothorax is seen on this supine film. Upright images are more sensitive to detect anterior pneumothoraces if relevant.  No displaced fractures. 1 view reviewed. 1 view reviewed. Impression: No acute cardiopulmonary disease within limitations of supine imaging. Workstation performed: Nahid Lucas bedside procedure    Result Date: 6/8/2023  Narrative: [id number]. [id number]    TRAUMA - CT abdomen pelvis w contrast    Result Date: 6/7/2023  Narrative: CT ABDOMEN AND PELVIS WITH IV CONTRAST INDICATION:   TRAUMA. COMPARISON:  None. TECHNIQUE:  CT examination of the abdomen and pelvis was performed. Multiplanar 2D reformatted images were created from the source data. This examination, like all CT scans performed in the [facility], was performed utilizing techniques to minimize radiation dose exposure, including the use of iterative reconstruction and automated exposure control. Radiation dose length product (DLP) for this visit:  1497.37 mGy-cm IV Contrast:  100 mL of iohexol (OMNIPAQUE) Enteric Contrast:  Enteric contrast was not administered. FINDINGS: ABDOMEN LOWER CHEST: Moderate cardiomegaly. Pacemaker wires in the right cardiac chambers. Small right greater than left low-density pleural effusions. No significant consolidation. Patent visible bronchial tree. No biliary dilatation. LIVER/BILIARY TREE: The liver is 19 cm craniocaudad with suspected steatosis. GALLBLADDER: Appears completely decompressed. SPLEEN:  Unremarkable. PANCREAS:  Unremarkable. ADRENAL GLANDS:  Unremarkable. KIDNEYS/URETERS: No hydronephrosis. 6 mm nonobstructive right renal calculus. 5 x 4 cm cyst in the upper pole of the right kidney. One or more sharply circumscribed subcentimeter renal hypodensities are present, too small to accurately characterize, and statistically most likely benign findings. According to recent literature (Radiology 2019) no further workup of these findings is recommended. STOMACH AND BOWEL: No evidence of bowel obstruction or gross inflammatory process.  Uncomplicated scattered colonic diverticula. APPENDIX:  No findings to suggest appendicitis. ABDOMINOPELVIC CAVITY: Trace nonorganized fluid. No pneumoperitoneum. No lymphadenopathy by size criteria. VESSELS: No aneurysm. Clipping of the right common iliac vein and infrarenal IVC. PELVIS REPRODUCTIVE ORGANS: The prostate is enlarged. URINARY BLADDER:  Unremarkable. ABDOMINAL WALL/INGUINAL REGIONS: Small fat-containing umbilical hernia. Mild anasarca. OSSEOUS STRUCTURES: Age-indeterminate severe central compression deformity of L4. No retropulsion. No other fracture identified. Multilevel degenerative changes in the visible spine. Impression: Age-indeterminate severe central compression deformity of L4. No retropulsion. Correlate to exclude focal tenderness. Otherwise no fracture suspected. Small pleural effusions, mild anasarca and trace nonorganized abdominal fluid suggestive of third spacing, fluid overload, or right cardiac failure. Correlate with clinical findings. Hepatomegaly. Nonobstructive right renal stone. Other findings, as per the body of the report. The major findings on CT head, cervical spine, CT abdomen and pelvis, were given to Dr. Amalia Choe, 6/7/2023 at 97 70 84. Workstation performed: [id number]     TRAUMA - CT spine cervical wo contrast    Result Date: 6/7/2023  Narrative: CT CERVICAL SPINE - WITHOUT CONTRAST INDICATION:   TRAUMA. COMPARISON:  None. TECHNIQUE:  CT examination of the cervical spine was performed without intravenous contrast.  Contiguous axial images were obtained. Multiplanar 2D reformatted images were created from the source data. Radiation dose length product (DLP) for this visit:  491.63 mGy-cm . This examination, like all CT scans performed in the [facility], was performed utilizing techniques to minimize radiation dose exposure, including the use of iterative  reconstruction and automated exposure control. IMAGE QUALITY:  Diagnostic. FINDINGS: ALIGNMENT:  Normal alignment of the cervical spine. No subluxation. VERTEBRAE:  No fracture. DEGENERATIVE CHANGES: Bulky bridging anterior osteophytes suggestive of diffuse idiopathic skeletal hyperostosis. Multilevel discogenic disease and facet hypertrophy. No critical central stenosis. PREVERTEBRAL AND PARASPINAL SOFT TISSUES: Unremarkable THORACIC INLET: Incompletely visualized right pleural effusion. Small left apical blebs. Impression: No cervical spine fracture or traumatic malalignment. Chronic findings, as per the body of the report. Workstation performed: [id number]     TRAUMA - CT head wo contrast    Result Date: 6/7/2023  Narrative: CT BRAIN - WITHOUT CONTRAST INDICATION:   TRAUMA. Fall, head strike COMPARISON:  None. TECHNIQUE:  CT examination of the brain was performed. Multiplanar 2D reformatted images were created from the source data. Radiation dose length product (DLP) for this visit:  943.27 mGy-cm . This examination, like all CT scans performed in the [facility], was performed utilizing techniques to minimize radiation dose exposure, including the use of iterative  reconstruction and automated exposure control. IMAGE QUALITY:  Diagnostic. FINDINGS: PARENCHYMA: Decreased attenuation is noted in periventricular and subcortical white matter demonstrating an appearance that is statistically most likely to represent moderate microangiopathic change. No CT signs of acute infarction. No intracranial mass, mass effect or midline shift. No acute parenchymal hemorrhage. VENTRICLES AND EXTRA-AXIAL SPACES: Ventricles and extra-axial CSF spaces are prominent commensurate with the degree of volume loss. No hydrocephalus. No acute extra-axial hemorrhage. VISUALIZED ORBITS: Status post bilateral cataract surgery, otherwise unremarkable. PARANASAL SINUSES: Mucoperiosteal thickening in the right sphenoid sinus. CALVARIUM AND EXTRACRANIAL SOFT TISSUES:  Normal.     Impression: No acute intracranial abnormality.  Chronic intracranial changes, as above. Acute on chronic right sphenoid sinusitis. Workstation performed: [id number]     XR Trauma multiple ([facility] trauma bay ONLY)    Result Date: 6/7/2023  Narrative: TRAUMA SERIES INDICATION:  TRAUMA. COMPARISON:  None VIEWS:  XR TRAUMA MULTIPLE FINDINGS: CHEST: Sternotomy wires. Left atrial clip. Pacemaker chamber. Prosthetic aortic valve. Supine frontal view of the chest is obtained. Cardiomediastinal silhouette is within normal limits accounting for technique and patient positioning. Poor inspiration. Prominent vascular interstitial markings. . Blunted costophrenic angles. . No pneumothorax is seen on this supine film. Upright images are more sensitive to detect anterior pneumothoraces if relevant. No displaced fractures. 1 view reviewed. 1 view reviewed. Impression: No acute cardiopulmonary disease within limitations of supine imaging. Workstation performed: [id number]     EGD with Push Enteroscopy    Result Date: 5/26/2023  Narrative: Table formatting from the original result was not included. [address] [place] [address] Endoscopy [address] [place] [address] E: 5/26/23 PHYSICIAN(S): Attending: Dora Stevenson MD Fellow: Miranda Graves MD INDICATION: Melena POST-OP DIAGNOSIS: See the impression below. PREPROCEDURE: Informed consent was obtained for the procedure, including sedation. Risks of perforation, hemorrhage, adverse drug reaction and aspiration were discussed. The patient was placed in the left lateral decubitus position. Patient was explained about the risks and benefits of the procedure. Risks including but not limited to bleeding, infection, and perforation were explained in detail. Also explained about less than 100% sensitivity with the exam and other alternatives. PROCEDURE: EGD DETAILS OF PROCEDURE: Patient was taken to the procedure room where a time out was performed to confirm correct patient and correct procedure.  The patient underwent monitored anesthesia care, which was administered by an anesthesia professional. The patient's blood pressure, ECG, heart rate, level of consciousness, oxygen, respirations and ETCO2 were monitored throughout the procedure. The scope was introduced through the mouth and advanced to the jejunum. Fluoroscopy was not used. The patient experienced no blood loss. The procedure was not difficult. The patient tolerated the procedure well. There were no apparent adverse events. 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 RECOMMENDATION: AVM bleed likely in the setting of supra-therapeutic INR Start diet Monitor H&H No further inpatient procedures unless patients re-bleeds Ok for María Gill MD     XR chest 1 view portable    Result Date: 5/25/2023  Narrative: CHEST INDICATION:   dyspnea. COMPARISON: 4/5/2023 EXAM PERFORMED/VIEWS:  XR CHEST PORTABLE FINDINGS: Median sternotomy wires are present. Stable cardiomegaly is identified. Cardiomediastinal silhouette appears unremarkable. Mild pulmonary vascular congestion is present. Small bilateral pleural effusions are present. No pneumothorax is identified. Osseous structures appear within normal limits for patient age.      Impression: Mild pulmonary vascular congestion and small bilateral pleural effusions Workstation performed: [id number]      bedside procedure    Result Date: 5/25/2023  Narrative: [id number]. [id number]    Cardiac EP device report    Result Date: 5/24/2023  Narrative: MDT DUAL CHAMBER ICD/ NOT MRI CONDITIONAL DEVICE INTERROGATED IN THE [facility] OFFICE. BATTERY VOLTAGE ADEQUATE (1.9 YRS). AP-5%, -0.1%. ALL LEAD PARAMETERS WITHIN NORMAL LIMITS. NO NEW SIGNIFICANT HIGH RATE EPISODES. WAVELET TEMPLATE COLLECTED. PT TO SEE DR. RAMESH TODAY. NORMAL DEVICE FUNCTION. GV       EKG, Pathology, and Other Studies Reviewed on Admission:   · EKG: Reviewed      Counseling / Coordination of Care Time: 30 total mins spent n consult. Greater than 50% of total time spent on patient counseling and coordination of care. Serena Khan MD   PGY-5,   Gastroenterology    . ..............................................................................................................................................  ** Please Note: This note is constructed using a voice recognition dictation system.  **

## 2023-06-16 NOTE — PROGRESS NOTES
Critical Care Attending Note    81 year with DM2, CKD III, Afib on warfarin, HFpEF, prior SBE requiring bioprosthetic AoVR 2014 presented 2 days weakness and fatigue. Found to have severe hypoglycemia, hyperkalemia, REMA, severe lactic acidosis, coagulopathy. Medically managed hyperkalemia, admitted to ICU, given PCC/Vit K, CVC placed and started on IHD then transitioned to CRRT. Hypotension and started vasopressors. Suspected UTI inciting event with medication accumulation including metformin/sotolol.       24hr events - resident placed left HD catheter but not functional with clots, right femoral HD cath improved flows with cathflow, transiently required BiPAP after versed for line placement. Weaning vasopressors.   Feels fatigued, no chest pain, no abd pain    VS AF, HR 60's, MAPS 80-90's, SpO2 97% RA, I/Os +3.7L, UOP 80cc  Exam  GEN Older man, in bed, conversant, ill appearing, oriented x 3  HEENT ATNC, MMM, no thrush  NECK right IJ introducer catheter in place, no purulence, elevated JVD  CV reg, single s1/2, II/VI AMADOU  Pulm diminished at bases, no wheeze, no rales  ABD obese +BS Soft NTND, no rebound  EXT warm, brisk cap refill, 2+ edema to thighs, CVS changes, RLE wound   alfred in place, scant urine    Laboratory and Diagnostics  Results from last 7 days   Lab Units 06/16/23  0550 06/16/23  0103 06/15/23  2010 06/15/23  1949 06/15/23  0526 06/15/23  0233 06/14/23  2249 06/14/23  2235 06/14/23  1520   WBC Thousand/uL 13.25* 14.99* 14.86*  --  11.24* 9.19  --  6.14 6.08   HEMOGLOBIN g/dL 8.2* 7.9* 8.7*  --  7.8* 8.2*  --  8.4* 9.4*   I STAT HEMOGLOBIN g/dl  --   --   --  8.5*  --   --  8.8*  --   --    HEMATOCRIT % 25.7* 25.4* 28.3*  --  26.0* 26.3*  --  27.6* 31.7*   HEMATOCRIT, ISTAT %  --   --   --  25*  --   --  26*  --   --    PLATELETS Thousands/uL 123* 138* 171  --  180 203  --  266 274   NEUTROS PCT %  --   --  91*  --  82* 82*  --  67 66   BANDS PCT %  --  15*  --   --   --   --   --   --   -- MONOS PCT %  --   --  3*  --  8 7  --  12 10   MONO PCT %  --  0*  --   --   --   --   --   --   --    EOS PCT %  --  0 0  --  1 0  --  0 0     Results from last 7 days   Lab Units 06/16/23  0550 06/16/23  0103 06/15/23  2010 06/15/23  1949 06/15/23  1810 06/15/23  1201 06/15/23  0503 06/15/23  0233 06/14/23  2249 06/14/23  2235 06/14/23  1851 06/14/23  1520   SODIUM mmol/L 133* 133* 132*  --  130* 132* 133* 131*   < > 132*   < > 130*   POTASSIUM mmol/L 5.1 5.1 5.4*  --  5.1 5.1 5.2 5.3   < > 6.9*   < > 7.4*   CHLORIDE mmol/L 101 101 100  --  101 101 100 98   < > 104   < > 102   CO2 mmol/L 20* 20* 18*  --  19* 17* 17* 15*   < > 11*   < > 12*   CO2, I-STAT mmol/L  --   --   --  23  --   --   --   --    < >  --   --   --    ANION GAP mmol/L 12 12 14*  --  10 14* 16* 18*   < > 17*   < > 16*   BUN mg/dL 33* 33* 34*  --  33* 40* 54* 60*   < > 80*   < > 81*   CREATININE mg/dL 3.35* 3.72* 3.89*  --  3.46* 4.33* 5.55* 6.06*   < > 8.19*   < > 8.70*   CALCIUM mg/dL 9.1 9.0 9.1  --  9.1 9.2 8.8 8.9   < > 9.5   < > 9.2   GLUCOSE RANDOM mg/dL 230* 204* 203*  --  155* 148* 96 98   < > 92   < > 43*   ALT U/L  --   --  30  --   --   --   --   --   --  20  --  19   AST U/L  --   --  58*  --   --   --   --   --   --  33  --  28   ALK PHOS U/L  --   --  101  --   --   --   --   --   --  92  --  105   ALBUMIN g/dL  --   --  2.8*  --   --   --   --   --   --  2.8*  --  3.1*   TOTAL BILIRUBIN mg/dL  --   --  1.86*  --   --   --   --   --   --  0.90  --  1.06*    < > = values in this interval not displayed.      Results from last 7 days   Lab Units 06/16/23  0550 06/16/23  0103 06/15/23  2010 06/15/23  1810 06/15/23  1201 06/15/23  0503 06/15/23  0233   MAGNESIUM mg/dL 2.4 2.4 2.3 2.1 2.2 1.5* 1.4*   PHOSPHORUS mg/dL 3.7 3.9 4.5* 3.1 3.7 5.1* 5.8*      Results from last 7 days   Lab Units 06/16/23  0550 06/15/23  0503 06/14/23  2240 06/14/23  2235 06/14/23  2045 06/14/23  1851   INR  1.95* 2.51* 2.35* 2.50* >15.00* >15.00*   PTT seconds --  43*  --   --  74* 77*          Results from last 7 days   Lab Units 06/16/23  0550 06/15/23  2138 06/15/23  1810 06/15/23  0503 06/15/23  0233 06/15/23  0032 06/14/23  2235   LACTIC ACID mmol/L 5.7* 8.4* 5.1* 7.8* 8.8* 8.5* 9.9*                     Results from last 7 days   Lab Units 06/14/23  1851   PROCALCITONIN ng/ml 0.25       ABG:   Results from last 7 days   Lab Units 06/16/23  0108   PH ART  7.334*   PCO2 ART mm Hg 36.6   PO2 ART mm Hg 71.2*   HCO3 ART mmol/L 19.0*   BASE EXC ART mmol/L -6.2   ABG SOURCE  Line, Arterial     MICRO  MRSA pending  Urine Cx pending - large LE, inumm WBC, mod bacteria - 50-59K GNR enteric like  Bld Cx 6/14 NGTD     RADIOGRAPHS - images personally reviewed  CXR 6/15 - AICD in place, left HD cath and RIJ introducer cath in good position, CM, blunted CP angles, cardiomegaly, no PTX    CXR 6/14 - AICD in place T/L in good position, central vascular congestion, blunted CP Angles, no PTX     CT abd/pelvis 6/14 small right effusion small ascites, no hydro, stable perinephric/ureteral fat stranding, no abscess      TTE 6/2023 - EF 70%, RV volume overload, RVSP 73mmHG,     TTE 5/2022 EF 60%, mod dilated RV, bioprosthetic  AoVR      Assessment  1. Shock - suspected mixed septic shock, acidosis, and hypovolemic (hypovolemic resolved)  2. SIRS/Sepsis - POA - temp/RR/Lactate  3. Suspected Urinary tract infection  4. REMA/CKD with severe hyperkalemia   5. Severe acidosis elevated lactate, hypoglycemia - concern for metformin toxicity   6. Bradycardia - improving  7. Coagulopathy on warfarin - improving  8. Hyponatremia - improving  9. Toxic/metabolic encepalopathy  10. Bioprosthetic AoVR  11. Anemia   12. DM  13. Diastolic CHF  14. Atrial fibrillation   15.  Chronic LE wound and lymphedema     PLAN  • NEURO - delirium precautions, overall improved  • CARDIAC - goal MAP >65, continue /NE, wean NE today as tolerated, continue stress dosed steroids, if able to wean NE, start UF 25-50cc/hr, start UFH gtt now  • PULM - IS, OOB-chair as able, chronic effusion will not sample  • GI - CLD as tolerated as long as vasopressor needs are reducing,  PPI  • RENAL - continue CRRT running even for now - start UF as above if NE needs reducing, continue normal bicarb 150/hr attempt to wean with BMP q6hrs, tend LA and Na   • ENDO - holding metformin, checking  admission metformin level, as weaning normal bicarb, check BS q4hrs may ISS alone for now  • HEME - holding warfarin, daily INR, starting UFH gtt  • ID - continue cefepime, stop vanc for now, follow up cultures, temp/WBC  • ICU Care  SCDs, UFH gtt, PPI, CHG  • Wound Care  • Full Code    Critical Care Time excluding procedures, teaching and updates is 35 min    Elizabeth Hollis DO

## 2023-06-17 PROBLEM — R94.31 PROLONGED QT INTERVAL: Status: ACTIVE | Noted: 2023-06-17

## 2023-06-17 NOTE — SPEECH THERAPY NOTE
Speech-Language Pathology Bedside Swallow Evaluation    Patient Name: Yomaira Oconnor    Today's Date: 6/17/2023     Problem List  Active Problems:    Acute on chronic anemia    CKD (chronic kidney disease) stage 3, GFR 30-59 ml/min (HCC)    Acute kidney injury superimposed on stage 3 chronic kidney disease     Hypomagnesemia    CHF (congestive heart failure) ([facility])    Acidosis    Hyperkalemia    Hyponatremia    Hyperphosphatemia    Past Medical History  Past Medical History:   Diagnosis Date   • Anemia    • Arthritis    • Atrial fibrillation ([address])    • Basal cell carcinoma 03/22/2022    Tip of Nose   • CHF (congestive heart failure) ([facility])    • Diabetes mellitus ([address])     Niddm   • DVT (deep vein thrombosis) in pregnancy 1966    not in pregnancy   • DVT (deep venous thrombosis) ([address]) 1966   • Dyslipidemia    • Encephalopathy acute 11/4/2022   • GERD (gastroesophageal reflux disease)    • Hyperlipidemia    • Hypertension    • Hypomagnesemia 10/19/2022   • Irregular heart beat     Afib   • Morbid obesity due to excess calories ([address])     Resolved 9/2/2014    • Pulmonary embolism ([facility])    • Sepsis ([address])    • Squamous cell skin cancer 07/30/2020    Left posterior scalp   • Visual impairment      Past Surgical History  Past Surgical History:   Procedure Laterality Date   • AORTIC VALVE REPLACEMENT     • CARDIAC DEFIBRILLATOR PLACEMENT  04/2014   • CARDIAC SURGERY  02/2014    AVR   • COLONOSCOPY     • INSERT / REPLACE / REMOVE PACEMAKER     • IR ASPIRATION BAKERS CYST  3/8/2023   • IR CHOLECYSTOSTOMY TUBE CHECK/CHANGE/REPOSITION/REINSERTION/UPSIZE  10/13/2022   • IR CHOLECYSTOSTOMY TUBE CHECK/CHANGE/REPOSITION/REINSERTION/UPSIZE  10/19/2022   • IR CHOLECYSTOSTOMY TUBE CHECK/CHANGE/REPOSITION/REINSERTION/UPSIZE  10/27/2022   • IR CHOLECYSTOSTOMY TUBE CHECK/CHANGE/REPOSITION/REINSERTION/UPSIZE  11/7/2022   • IR CHOLECYSTOSTOMY TUBE CHECK/CHANGE/REPOSITION/REINSERTION/UPSIZE  11/10/2022   • IR CHOLECYSTOSTOMY TUBE CHECK/CHANGE/REPOSITION/REINSERTION/UPSIZE  12/1/2022   • IR CHOLECYSTOSTOMY TUBE CHECK/CHANGE/REPOSITION/REINSERTION/UPSIZE  1/6/2023   • IR CHOLECYSTOSTOMY TUBE CHECK/CHANGE/REPOSITION/REINSERTION/UPSIZE  1/24/2023   • IR CHOLECYSTOSTOMY TUBE CHECK/CHANGE/REPOSITION/REINSERTION/UPSIZE  3/15/2023   • IR CHOLECYSTOSTOMY TUBE PLACEMENT  9/1/2022   • IR DRAINAGE TUBE CHECK AND/OR REMOVAL  3/22/2023   • IR DRAINAGE TUBE CHECK AND/OR REMOVAL  4/10/2023   • IR DRAINAGE TUBE PLACEMENT  4/6/2023   • JOINT REPLACEMENT Left     LTKR   • MOHS SURGERY  07/30/2020    Left posterior scalp, Dr. Gita Serna   • MOHS SURGERY  04/18/2022    BCC Tip of Nose- Dr. Gita Serna   • [place] DIVJ&STRIP LONG SAPH [place]ressa Yue Right 8/17/2018    Procedure: LEG PERFORATED INJECTION SCLEROTHERAPY;  Surgeon: Jeanie Boateng MD;  Location: [facility];  Service: Vascular   • REPLACEMENT TOTAL KNEE Right    • TOTAL KNEE ARTHROPLASTY Left    • VASCULAR SURGERY     • VENA CAVA FILTER PLACEMENT      Interruption inferior vena cava, [place] filter, placement   • WISDOM TOOTH EXTRACTION         Summary  Pt presented with s/s suggestive of mild-moderate oral and suspected moderate pharyngeal dysphagia. Symptoms or concerns included decreased bolus formation and manipulation, and suspected reduced oral control, as well as suspected pharyngeal swallow delay and suspected decreased hyolaryngeal elevation upon palpation. RNs expressed concerns re: pt desatting after drinking thin liquids. Per discussion with pt and spouse, pt has been experiencing dysphagia for the past few weeks. Recommend MBS once pt is off of CRRT, pt agreeable to conservative diet until MBS can be completed.      Risk/s for Aspiration: mod    Recommended Diet: puree/level 1 diet and honey thick liquids   Recommended Form of Meds: crushed with puree   Aspiration precautions and swallowing strategies: upright posture and only feed when fully alert  Other Recommendations: Continue frequent oral care      Current Medical Status per H&P 6/14/23  Kulwinder Schwab is a 81 y.o. male who presents to ED reporting 2 days of generalized weakness, fatigue, loss of appetite, and feeling generally unwell. Denies chest pain, dyspnea, isolated neuro deficits, or recent change in medications. Workup in ED significant for UTI, AGMA, hypoglycemia, lactic acidosis, and potassium > 7. Assessed by nephrology in ED. On arrival to ICU patient was alert and oriented but becoming increasingly dysarthric. Patient became bradycardic with widening QRS complexes on telemetry. Patient was treated w/ temporizing measures for hyperkalemia and put on HD. Special Studies:  CXR 6/15/23 IMPRESSION:  Left-sided line seen with tip in the SVC right atrial junction  No pneumothorax  Pulmonary congestion with small effusion      Social/Education/Vocational Hx:  Pt lives with his wife    Swallow Information   Current Risks for Dysphagia & Aspiration: pt report of dysphagia and change in resp status  Current Symptoms/Concerns: desatting with liquid intake  Current Diet: clear liquids   Baseline Diet: regular diet and thin liquids      Baseline Assessment   Behavior/Cognition: alert  Speech/Language Status: able to participate in conversation and able to follow commands  Patient Positioning: upright in bed  Pain Status/Interventions/Response to Interventions: No report of or nonverbal indications of pain.        Swallow Mechanism Exam  Facial: symmetrical  Labial: WFL  Lingual: WFL  Velum: symmetrical  Mandible: adequate ROM  Dentition: adequate  Vocal quality:clear/adequate   Volitional Cough: weak       Consistencies Assessed and Performance   Consistencies Administered: honey thick and puree    Oral Stage: mild-moderate, decreased mastication, decreased bolus formation and suspected decreased control of thin liquids     Pharyngeal Stage: moderate, suspected pharyngeal swallow delay and suspected decreased hyolaryngeal elevation upon palpation    Esophageal Concerns: none reported      Summary and Recommendations (see above)    Results Reviewed with: patient, RN and MD     Treatment Recommended: yes     Frequency of treatment: 2-3x/week    Dysphagia LTG  -Patient will demonstrate safe and effective oral intake (without overt s/s significant oral/pharyngeal dysphagia including s/s penetration or aspiration) for the highest appropriate diet level. Short Term Goals:  -Pt will tolerate Dysphagia 1/pureed diet and honey thick liquid with no significant s/s oral or pharyngeal dysphagia across 1-3 diagnostic sessions    -Patient will tolerate trials of upgraded food and/or liquid texture with no significant s/s of oral or pharyngeal dysphagia including aspiration across 1-3 diagnostic sessions     -Patient will comply with a Video/Modified Barium Swallow study for more complete assessment of swallowing anatomy/physiology/aspiration risk and to assess efficacy of treatment techniques so as to best guide treatment plan.

## 2023-06-17 NOTE — PROGRESS NOTES
Procedure Note - Nephrology   Clarisse Arce 81 y.o. male MRN: [id number]  Unit/Bed#: MICU 13 Encounter: [id number]      Assessment / Plan:  1.  Oligoanuric acute kidney injury complicated by severe hyperkalemia and metabolic acidosis  -REMA likely due to septic ATN from urinary source plus or minus diuretic use plus or minus contrast associated nephropathy  - Status post IV dye with CT abdomen and pelvis on June 7  - Patient status post emergent hemodialysis therapy June 14, 2023 for hyperkalemia  - CRRT initiated, patient on 4K/3 calcium bath with even UF as patient hemodynamically unstable on vasopressin- will continue this  - Continue to monitor serial labs  -Patient seen and examined by me on CRRT today  - Continue to monitor for signs of renal recovery  - Combs in place  - CK level normal  - Baseline serum creatinine previously 1.3-1.6     2.  Severe hyperkalemia in the setting of severe REMA- resolved status post emergent HD treatment as well as CRRT initiation.  Potassium improving, continue 4K bath     3.  Anion gap acidosis in the setting of lactic acidosis-improving with CRRT, monitor off bicarb gtt      4.  Hyponatremia-likely dilutional in the setting of REMA and decreased free water excretion, continue to monitor serial labs, monitor on CRRT     5.  Hyperphosphatemia-Phos 7.7-->2.4, resolved on CRRT, monitor this     6.  Hypomagnesemia-resolved s/p repletion     7.  Anemia in the setting of critical illness as well as history of monoclonal gammopathy and melena-hemoglobin 7.9, monitor CBC, serum immunofixation performed October 2022 shows monoclonal gammopathy as IgM lambda, consider outpatient hematology consultation. GI consulted for melena.  No urgent plans for EGD     8.  Hypotension in the setting of septic shock-continue pressors per ICU team, maintain MAP greater than 65 for renal perfusion     9.  Sepsis likely due to UTI-on antibiotics per ICU team, CT does not show acute abdominal or pelvic pathology, small abdominal and pelvic ascites noted with unchanged bilateral perinephric and periureteral fat stranding as well as small to moderate right pleural effusion     10.  History of diastolic CHF-diuretics on hold in light of REMA, monitor daily weight        Subjective:   Patient seen and examined by me on CRRT at approximately 12:55 PM.  Denies chest pain, shortness of breath or leg edema. On 1 L oxygen via nasal cannula and remains on vasopressin drip. Only on even UF. Blood pressure 142/60. Objective:     Vitals: Blood pressure 123/66, pulse 70, temperature 97.5 °F (36.4 °C), resp. rate 21, height 6' 1" (1.854 m), weight (!) 140 kg (309 lb 8.4 oz), SpO2 100 %. ,Body mass index is 40.84 kg/m². Temp (24hrs), Av.4 °F (36.3 °C), Min:96.8 °F (36 °C), Max:98.2 °F (36.8 °C)      Weight (last 2 days)     Date/Time Weight    23 0546 140 (309.53)    06/15/23 1034 137 (303)    06/15/23 0549 138 (303.79)            Intake/Output Summary (Last 24 hours) at 2023 1500  Last data filed at 2023 1301  Gross per 24 hour   Intake 3080 ml   Output 3444 ml   Net -364 ml     I/O last 24 hours: In: 4984.3 [P.O.:270; I.V.:3423.6; Blood:320; IV Piggyback:970.7]  Out: 9065 [Urine:231; QCGGC:9242]        Physical Exam:   Physical Exam  Vitals reviewed. Constitutional:       General: He is not in acute distress. Appearance: He is well-developed. He is obese. He is not ill-appearing or diaphoretic. HENT:      Head: Normocephalic and atraumatic. Nose: Nose normal.      Mouth/Throat:      Mouth: Mucous membranes are moist.      Pharynx: No oropharyngeal exudate. Eyes:      General: No scleral icterus. Right eye: No discharge. Left eye: No discharge. Neck:      Thyroid: No thyromegaly. Cardiovascular:      Rate and Rhythm: Normal rate and regular rhythm. Heart sounds: Normal heart sounds.    Pulmonary:      Effort: Pulmonary effort is normal.      Breath sounds: Normal breath sounds. No wheezing or rales. Abdominal:      General: Bowel sounds are normal. There is no distension. Palpations: Abdomen is soft. Tenderness: There is no abdominal tenderness. Genitourinary:     Comments: alfred  Musculoskeletal:         General: No swelling. Normal range of motion. Cervical back: Neck supple. Lymphadenopathy:      Cervical: No cervical adenopathy. Skin:     General: Skin is warm and dry. Findings: No rash. Neurological:      General: No focal deficit present. Mental Status: He is alert.       Comments: awake   Psychiatric:         Mood and Affect: Mood normal.         Behavior: Behavior normal.         Invasive Devices     Peripheral Intravenous Line  Duration           Peripheral IV 06/14/23 Left Antecubital 2 days    Peripheral IV 06/14/23 Right;Ventral (anterior) Forearm 2 days          Arterial Line  Duration           Arterial Line 06/14/23 Radial 2 days          Hemodialysis Catheter  Duration           HD Temporary Double Catheter 3 days          Drain  Duration           Urethral Catheter Temperature probe 16 Fr. 2 days                Medications:    Scheduled Meds:  Current Facility-Administered Medications   Medication Dose Route Frequency Provider Last Rate   • acetaminophen  650 mg Oral Q6H PRN Katey Medrano MD     • cefTRIAXone  1,000 mg Intravenous Q24H Charlotte Tomas PA-C 1,000 mg (06/17/23 1152)   • chlorhexidine  15 mL Mouth/Throat Q12H [address], MD     • cholecalciferol  1,000 Units Oral Daily Zaira Hernadez MD     • diphenhydramine, lidocaine, Al/Mg hydroxide, simethicone  10 mL Swish & Spit Q6H PRN Chelsey Granados PA-C     • insulin lispro  2-12 Units Subcutaneous Q6H [address] Het D Javier, DO     • lidocaine  1 patch Topical Daily Zaira Hernadez MD     • magnesium sulfate  1 g Intravenous Once [place] Azar, DO     • norepinephrine  1-30 mcg/min Intravenous Titrated Bridgette Horton MD Stopped (06/17/23 0306)   • NxStage K 4/Ca 3  20,000 mL Dialysis Continuous Darlin K DO Sandra     • ondansetron  4 mg Intravenous Q6H PRN Mathew Henning PA-C     • pantoprazole  40 mg Intravenous Q12H [facility] Het SARWAT Herrera DO     • sodium phosphate  6 mmol Intravenous Once Bridget Ferrer DO     • trimethobenzamide  200 mg Intramuscular Q6H PRN Reddy King MD     • vasopressin  0.04 Units/min Intravenous Continuous Yahir Covarrubias MD 0.04 Units/min (06/17/23 0909)     Facility-Administered Medications Ordered in Other Encounters   Medication Dose Route Frequency Provider Last Rate   • sodium chloride  75 mL/hr Intravenous Continuous Gema Deshpande MD Stopped (05/26/23 1426)       PRN Meds:.•  acetaminophen  •  diphenhydramine, lidocaine, Al/Mg hydroxide, simethicone  •  ondansetron  •  trimethobenzamide    Continuous Infusions:norepinephrine, 1-30 mcg/min, Last Rate: Stopped (06/17/23 0306)  NxStage K 4/Ca 3, 20,000 mL  vasopressin, 0.04 Units/min, Last Rate: 0.04 Units/min (06/17/23 0909)            LAB RESULTS:      Results from last 7 days   Lab Units 06/17/23  1217 06/17/23  0535 06/17/23  0013 06/16/23  2033 06/16/23  1811 06/16/23  1623 06/16/23  1206 06/16/23  0550 06/16/23  0103 06/15/23  2010 06/15/23  1949 06/15/23  1201 06/15/23  0526 06/15/23  0503 06/15/23  0233 06/15/23  0032 06/14/23  2249 06/14/23  2235 06/14/23  1851 06/14/23  1520   0000   WBC Thousand/uL 11.98* 10.68*  --   --   --   --   --  13.25* 14.99* 14.86*  --   --  11.24*  --  9.19  --   --  6.14  --  6.08  --    HEMOGLOBIN g/dL 7.9* 6.9*  --  7.3*  --  7.5*  --  8.2* 7.9* 8.7*  --   --  7.8*  --  8.2*  --   --  8.4*  --  9.4*  --    I STAT HEMOGLOBIN g/dl  --   --   --   --   --   --   --   --   --   --  8.5*  --   --   --   --   --  8.8*  --   --   --    < >   HEMATOCRIT % 24.9* 22.4*  --  22.6*  --  23.0*  --  25.7* 25.4* 28.3*  --   --  26.0*  --  26.3*  --   --  27.6*  --  31.7*  --    HEMATOCRIT, ISTAT %  --   --   --   --   --   --   -- --   --   --  25*  --   --   --   --   --  26*  --   --   --    < >   PLATELETS Thousands/uL 78* 88*  --   --   --   --   --  123* 138* 171  --   --  180  --  203  --   --  266  --  274  --    NEUTROS PCT %  --  85*  --   --   --   --   --   --   --  91*  --   --  82*  --  82*  --   --  67  --  66  --    LYMPHS PCT %  --  7*  --   --   --   --   --   --   --  5*  --   --  8*  --  10*  --   --  19  --  22  --    LYMPHO PCT %  --   --   --   --   --   --   --   --  1*  --   --   --   --   --   --   --   --   --   --   --   --    MONOS PCT %  --  7  --   --   --   --   --   --   --  3*  --   --  8  --  7  --   --  12  --  10  --    MONO PCT %  --   --   --   --   --   --   --   --  0*  --   --   --   --   --   --   --   --   --   --   --   --    EOS PCT %  --  0  --   --   --   --   --   --  0 0  --   --  1  --  0  --   --  0  --  0  --    POTASSIUM mmol/L 4.2 4.2 4.3  --  4.4  --  4.7 5.1 5.1 5.4*  --    < >  --    < > 5.3   < >  --  6.9*   < > 7.4*  --    CHLORIDE mmol/L 105 105 103  --  103  --  103 101 101 100  --    < >  --    < > 98   < >  --  104   < > 102  --    CO2 mmol/L 28 28 28  --  26  --  21 20* 20* 18*  --    < >  --    < > 15*   < >  --  11*   < > 12*  --    CO2, I-STAT mmol/L  --   --   --   --   --   --   --   --   --   --  23  --   --   --   --   --  12*  --   --   --    < >   BUN mg/dL 17 20 23  --  24  --  30* 33* 33* 34*  --    < >  --    < > 60*   < >  --  80*   < > 81*  --    CREATININE mg/dL 1.74* 1.99* 2.22*  --  2.68*  --  3.07* 3.35* 3.72* 3.89*  --    < >  --    < > 6.06*   < >  --  8.19*   < > 8.70*  --    CALCIUM mg/dL 9.0 8.8 8.6  --  8.8  --  8.7 9.1 9.0 9.1  --    < >  --    < > 8.9   < >  --  9.5   < > 9.2  --    ALK PHOS U/L  --   --   --   --   --   --   --   --   --  101  --   --   --   --   --   --   --  92  --  105  --    ALT U/L  --   --   --   --   --   --   --   --   --  30  --   --   --   --   --   --   --  20  --  19  --    AST U/L  --   --   --   --   --   --   --   -- --  62*  --   --   --   --   --   --   --  33  --  28  --    GLUCOSE, ISTAT mg/dl  --   --   --   --   --   --   --   --   --   --  194*  --   --   --   --   --  91  --   --   --   --    MAGNESIUM mg/dL 1.9 2.0 2.0  --  2.2  --  2.3 2.4 2.4 2.3  --    < >  --    < > 1.4*   < >  --  1.9  --  2.1  --    PHOSPHORUS mg/dL 2.4 2.3 2.3  --  2.3  --  3.3 3.7 3.9 4.5*  --    < >  --    < > 5.8*   < >  --  7.7*  --   --   --     < > = values in this interval not displayed. CUTURES:  Lab Results   Component Value Date    BLOODCX No Growth at 48 hrs. 06/14/2023    BLOODCX No Growth at 48 hrs. 06/14/2023    BLOODCX No Growth After 5 Days. 04/04/2023    BLOODCX No Growth After 5 Days. 04/04/2023    BLOODCX No Growth After 5 Days. 11/04/2022    BLOODCX No Growth After 5 Days. 11/04/2022    BLOODCX No Growth After 5 Days. 10/21/2022    BLOODCX No Growth After 5 Days. 10/21/2022    URINECX 50,000-59,000 cfu/ml Escherichia coli (A) 06/14/2023    URINECX 10,000-19,000 cfu/ml Klebsiella oxytoca (A) 06/14/2023    URINECX (A) 06/14/2023     <10,000 cfu/ml Alpha Hemolytic Streptococcus NOT Enterococcus    URINECX No Growth <1000 cfu/mL 07/09/2019    URINECX >100,000 cfu/ml Escherichia coli 05/19/2017    URINECX No Growth <1000 cfu/mL 04/20/2017    URINECX 1098-4878 cfu/ml Gram Negative Miquel 04/02/2017                 Portions of the record may have been created with voice recognition software. Occasional wrong word or "sound a like" substitutions may have occurred due to the inherent limitations of voice recognition software. Read the chart carefully and recognize, using context, where substitutions have occurred. If you have any questions, please contact the dictating provider.

## 2023-06-17 NOTE — PROGRESS NOTES
[address] [place] [address]  Progress Note: Critical Care  Name: Shelli Aleman 81 y.o. male I MRN: [id number]  Unit/Bed#: MICU 15 I Date of Admission: 6/14/2023   Date of Service: 6/16/2023 I Hospital Day: 2    Assessment/Plan   1. Shock - suspected mixed septic shock, and hypovolemic  2. SIRS Sepsis from suspected Urinary tract infection  3. REMA/CKD with severe hyperkalemia   4. Severe acidosis elevated lactate, hypoglycemia - suspected metformin toxicity   5. Upper GI Bleed  6. Hyponatremia - improving  7. Toxic/metabolic encephalopathy resolved  8. Bioprosthetic AVR  9. Anemia   10. DM  11. Diastolic CHF  12. Atrial fibrillation   13. Chronic LE wound and lymphedema    Neuro:   Diagnosis: Toxic metabolic encephalopathy, resolved, likely due to bradycardia, hypoglycemia, in the setting of sepsis, REMA and Electrolyte Imbalance evidenced by Dysarthria, treated with Hemodialysis and Frequent Neuro Checks  ? Plan: Q4H neuro check  • Diagnosis: analgesia  ? Plan: PRN tylenol, lidocaine patch     CV:   • Diagnosis: septic shock, UTI  ? Plan: Wean norepinephrine gtt to maintain MAP > 65; continue stress dose steroids, if able to wean pressors we will start UF 25 to 50 cc an hour  • Diagnosis: history of diastolic CHF EF 39%   ? Last echo in 2022; home medications include torsemide 40 twice daily   ? Echo this admission EF of 70% with signs of volume overload  ? Plan: echo  ? Hold home torsemide  • Diagnosis: history of PAF  ? Plan: hold home sotolol  ? 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 Patient interrogated with no findings  • Diagnosis: History of HLD on atorvastatin 40  ? Plan: hold home statin  • Diagnosis: hypothermia in setting of septic shock  ?  Plan: caitlin bal     Pulm:  • Diagnosis: R pleural effusion, chronic; transiently required bipap, to NC, now RA for SpO2<88%  ? Plan: supplemental O2 PRN to maintain SpO2 > 88 %     GI:   • Diagnosis: nausea, resolved  ? Plan: ondansetron PRN  • Diagnosis: history of GERD  ? 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 Suspected ATN secondary to contrast about 7 days ago, along with suspected septic shock, along with metformin toxicity  ? Plan: strict I/Os  ? CVVH, even  ? Pending metformin level  ? We will start UF once pressors are weaned  ? Q6H BMP, Mg, Phos  • Diagnosis: AGMA  ? Anion gap is closed with improving bicarb  ? Plan: bicarbonate gtt and will begin to wean once bicarb is WNL  ? Trend LA and BMP and will plan to wean bicarb  • Diagnosis: hyperkalemia, resolved  ? Plan: CVVH  • Diagnosis: BPH  ? Plan: alfred in place        F/E/N:   • Plan: bicarbonate gtt  • Q6H BMP and will begin to wean bicarb once improved  • Will place speech eval for concern for aspiration        Heme/Onc:   • Diagnosis: coagulopathy, supratherapeutic INR on presentation; resolved; S/p VitK, Kcentra              Plan: will start heparin drip, daily INRs  • Diagnosis: history of anemia due to chronic disease with suspected acute blood loss anemia  ? Plan: daily CBC  ? Transfuse for hgb < 7     Endo:   • Diagnosis: hypoglycemia in setting of septic shock  ? Plan: POC glucose Q6H  • Diagnosis: DM2  ? Plan: hold home metformin  ? SSI        ID:   • Diagnosis: UTI  ?  Urine culture showed 50,000-59,000 E. coli, 10 19,000 Klebsiella, and less than 100,000 alphahemolytic strep non-Enterococcus  ? Blood cultures x2 from 6/14 negative  ? MRSA in process and blood cultures x2 from 4/16 negative; vanc stopped 6/16  ? Plan: We will plan to de-escalate from cefepime to ceftriaxone based on susceptibilities        MSK/Skin:   • Diagnosis: back pain, chronic  ? Plan: analgesia as above  • Diagnosis: non-healing RLE wound  ? Plan: local wound care      ICU Core Measures     A: Assess, Prevent, and Manage Pain · Has pain been assessed? Yes  · Need for changes to pain regimen? No   B: Both SAT/SAT  · N/A   C: Choice of Sedation · RASS Goal: 0 Alert and Calm  · Need for changes to sedation or analgesia regimen? No   D: Delirium · CAM-ICU: Negative   E: Early Mobility  · Plan for early mobility? Yes   F: Family Engagement · Plan for family engagement today? Yes       Antibiotic Review: Patient on appropriate coverage based on culture data. Review of Invasive Devices: Combs Plan: Continue for accurate I/O monitoring for 48 hours  Central access plan: HD cath in place. Plan continue  Kaylee Plan: Keep arterial line for hemodynamic monitoring and frequent ABGs    Prophylaxis:  VTE VTE covered by:  heparin (porcine), Intravenous, Held at 06/16/23 2248  heparin (porcine), Intravenous  heparin (porcine), Intravenous       Stress Ulcer  covered bypantoprazole (PROTONIX) injection 40 mg [[id number]]          Subjective    The patient is an 56year-old male with past medical history of diastolic CHF, CKD 3, type 2 diabetes, paroxysmal atrial fibrillation, H/o endocarditis and VT with AVR bioprosthetic and bivent ICD, hyperlipidemia, BPH who presented to the ED for 2-day history of generalized weakness, loss balance, fatigue, and loss of appetite. Found to have severe REMA, electrolyte abnormalities lactic acidosis, and therapeutic INR on warfarin.   Status post hemodialysis to now CRRT, significant improvement of electrolyte abnormalities, persistent lactic acidosis, and reversal of warfarin started on anticoagulation complicated by upper GI bleed. Found to have UTI on antibiotics multifactorial shock with decreasing requirement of pressors and on steroids. Review of Systems   Constitutional: Positive for fatigue. Negative for chills, diaphoresis and fever. Respiratory: Negative for cough, shortness of breath and wheezing. Cardiovascular: Positive for leg swelling. Negative for chest pain. Gastrointestinal: Negative for abdominal distention, abdominal pain, constipation, diarrhea and nausea. Genitourinary: Negative for difficulty urinating. Neurological: Negative for dizziness. Objective                            Vitals I/O      Most Recent Min/Max in 24hrs   Temp 97.9 °F (36.6 °C) Temp  Min: 97.2 °F (36.2 °C)  Max: 98.6 °F (37 °C)   Pulse 64 Pulse  Min: 58  Max: 70   Resp 12 Resp  Min: 10  Max: 24   /66 BP  Min: 100/55  Max: 143/66   O2 Sat 97 % SpO2  Min: 91 %  Max: 100 %      Intake/Output Summary (Last 24 hours) at 6/16/2023 2325  Last data filed at 6/16/2023 2300  Gross per 24 hour   Intake 4964.1 ml   Output 3448 ml   Net 1516.1 ml         Diet Clear Liquid     Invasive Monitoring Physical exam   Arterial Line  [place] /58  Arterial Line BP  Min: 112/36  Max: 158/56   MAP 82 mmHg  Arterial Line MAP (mmHg)  Min: 56 mmHg  Max: 86 mmHg    Physical Exam  Constitutional:       General: He is not in acute distress. Appearance: Normal appearance. He is not ill-appearing or toxic-appearing. HENT:      Head: Normocephalic and atraumatic. Cardiovascular:      Rate and Rhythm: Normal rate and regular rhythm. Heart sounds: No murmur heard. No gallop. Pulmonary:      Effort: No respiratory distress. Breath sounds: No wheezing or rales. Abdominal:      General: Abdomen is flat. There is no distension. Tenderness: There is no abdominal tenderness. There is no guarding. Musculoskeletal:      Right lower leg: Edema present.       Left lower leg: Edema present. Neurological:      Mental Status: He is alert and oriented to person, place, and time.    Psychiatric:         Mood and Affect: Mood normal.         Behavior: Behavior normal.            Diagnostic Studies         Medications:  Scheduled PRN   cefepime, 2,000 mg, Q12H  chlorhexidine, 15 mL, Q12H [facility]  cholecalciferol, 1,000 Units, Daily  fludrocortisone, 0.05 mg, Daily  insulin lispro, 2-12 Units, Q6H AMANDA  lidocaine, 1 patch, Daily  methylPREDNISolone sodium succinate, 20 mg, Q12H AMANDA  pantoprazole, 40 mg, Q12H [facility]  sodium phosphate, 6 mmol, Once      acetaminophen, 650 mg, Q6H PRN  heparin (porcine), 2,000 Units, Q6H PRN  heparin (porcine), 4,000 Units, Q6H PRN  ondansetron, 4 mg, Q6H PRN       Continuous    heparin (porcine), 3-20 Units/kg/hr (Order-Specific), Last Rate: Stopped (06/16/23 2248)  norepinephrine, 1-30 mcg/min, Last Rate: 5 mcg/min (06/16/23 2308)  NxStage K 4/Ca 3, 20,000 mL  sodium bicarbonate 150 mEq in dextrose 5 % 1,000 mL infusion, 150 mL/hr, Last Rate: 150 mL/hr (06/16/23 2112)  vasopressin, 0.04 Units/min, Last Rate: 0.04 Units/min (06/16/23 1450)         Labs:    CBC    Recent Labs     06/16/23  0103 06/16/23  0550 06/16/23  1623 06/16/23  2033   WBC 14.99* 13.25*  --   --    HGB 7.9* 8.2* 7.5* 7.3*   HCT 25.4* 25.7* 23.0* 22.6*   * 123*  --   --    BANDSPCT 15*  --   --   --      BMP    Recent Labs     06/16/23  1206 06/16/23  1811   SODIUM 134* 134*   K 4.7 4.4    103   CO2 21 26   AGAP 10 5   BUN 30* 24   CREATININE 3.07* 2.68*   CALCIUM 8.7 8.8       Coags    Recent Labs     06/16/23  0550 06/16/23  0820 06/16/23  1433 06/16/23  2211   INR 1.95* 1.94*  --   --    PTT  --  45* 102* 154*        Additional Electrolytes  Recent Labs     06/16/23  1206 06/16/23  1811   MG 2.3 2.2   PHOS 3.3 2.3   CAIONIZED 1.13 1.11*          Blood Gas    Recent Labs     06/16/23  0108   PHART 7.334*   WZF1HOD 36.6   PO2ART 71.2*   [id number] 19.0*   BEART -6.2   SOURCE Line, Arterial Recent Labs     06/16/23  0108   SOURCE Line, Arterial    LFTs  Recent Labs     06/15/23  2010   ALT 30   AST 58*   ALKPHOS 101   ALB 2.8*   TBILI 1.86*       Infectious  No recent results  Glucose  Recent Labs     06/16/23  0103 06/16/23  0550 06/16/23  1206 06/16/23  1811   GLUC 204* 230* 227* 225*                   Het SARWAT Herrera DO

## 2023-06-17 NOTE — PROGRESS NOTES
Progress Note- Shauna Beatty 81 y.o. male MRN: [id number]    Unit/Bed#: MICU 13 Encounter: [id number]      Assessment and Plan:  Shauna Beatty is a 81 y.o. 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, CHF,  AVR on coumadin who presents with REMA, SIRS and started on CRRT. Gastroenterology has been consulted for assistance with management of melena     Melena  A-fib on Coumadin  Bioprosthetic aortic valve replacement    Patient continues to have small episodes of melena. Last bowel movement was yesterday. His hemoglobin dropped this morning but he is currently hemodynamically stable and off pressors. 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 His hemoglobin did drop and he is currently getting 1 unit packed RBC transfusion will assess response with hemoglobin transfusion  · Can continue IV PPI twice daily  · Patient had supratherapeutic INR on admission which was reversed  · Started on heparin yesterday which has been held today      Disposition:  We will continue to follow  ______________________________________________________________________    Subjective:     Patient complaining of right groin pain at the site of his dialysis catheter    Medication Administration - last 24 hours from 06/16/2023 1155 to 06/17/2023 1155       Date/Time Order Dose Route Action Action by     06/17/2023 0217 EDT sodium bicarbonate 150 mEq in dextrose 5 % 1,000 mL infusion 0 mL/hr Intravenous Stopped Milli Hernandez RN     06/16/2023 2112 EDT sodium bicarbonate 150 mEq in dextrose 5 % 1,000 mL infusion 150 mL/hr Intravenous [address] Surya Valverde RN     06/16/2023 1417 EDT sodium bicarbonate 150 mEq in dextrose 5 % 1,000 mL infusion 150 mL/hr Intravenous [address] Abigail Cheung RN     06/17/2023 1008 EDT chlorhexidine (PERIDEX) 0.12 % oral rinse 15 mL 15 mL Mouth/Throat Given Abigail Cheung RN     06/16/2023 2055 EDT chlorhexidine (PERIDEX) 0.12 % oral rinse 15 mL 15 mL Mouth/Throat Given Annie Gardner, RN     06/17/2023 1151 EDT ondansetron (ZOFRAN) injection 4 mg 4 mg Intravenous Given Annie Gardner, RN     06/17/2023 0858 EDT NxStage K 4/Ca 3 dialysis solution (RFP-401) 20,000 mL 20,000 mL Dialysis New [address] [place] [address] Rosalind Pop, RN     06/17/2023 0303 EDT NxStage K 4/Ca 3 dialysis solution (RFP-401) 20,000 mL 20,000 mL Dialysis New [address] [place] [address] Flormastera Nails, RN     06/16/2023 2048 EDT NxStage K 4/Ca 3 dialysis solution (RFP-401) 20,000 mL 20,000 mL Dialysis New Bag Annie Gardner, RN     06/16/2023 1430 EDT NxStage K 4/Ca 3 dialysis solution (RFP-401) 20,000 mL 20,000 mL Dialysis New Bag Annie Gardner, RN     06/17/2023 1020 EDT cholecalciferol (VITAMIN D) oral liquid 1,000 Units 0 Units Oral Hold Annie Gardner, RN     06/17/2023 1123 EDT lidocaine (LIDODERM) 5 % patch 1 patch 0 patch Topical Hold Annie Gardner, RN     06/16/2023 2103 EDT lidocaine (LIDODERM) 5 % patch 1 patch 1 patch Topical Patch Removed Annie Gardner, RN     06/16/2023 1200 EDT lidocaine (LIDODERM) 5 % patch 1 patch 0 patch Topical Hold Annie Gardner, RN     06/17/2023 7028 EDT vasopressin (PITRESSIN) 20 Units in sodium chloride 0.9 % 100 mL infusion 0.04 Units/min Intravenous [address], RN     06/17/2023 0030 EDT vasopressin (PITRESSIN) 20 Units in sodium chloride 0.9 % 100 mL infusion 0.04 Units/min Intravenous [address] [facility] Gudelia Hale, RN     06/16/2023 1450 EDT vasopressin (PITRESSIN) 20 Units in sodium chloride 0.9 % 100 mL infusion 0.04 Units/min Intravenous [address], RN     06/17/2023 3441 EDT norepinephrine (LEVOPHED) 8 mg (DOUBLE CONCENTRATION) IV in sodium chloride 0.9% 250 mL 0 mcg/min Intravenous Stopped Gudelia Hale RN     06/17/2023 0206 EDT norepinephrine (LEVOPHED) 8 mg (DOUBLE CONCENTRATION) IV in sodium chloride 0.9% 250 mL 1 mcg/min Intravenous Rate/Dose Change Gudelia Hale RN     06/17/2023 0030 EDT norepinephrine (LEVOPHED) 8 mg (DOUBLE CONCENTRATION) IV in sodium chloride 0.9% 250 mL 2 mcg/min Intravenous Rate/Dose Change Yaw Julian RN     06/16/2023 2346 EDT norepinephrine (LEVOPHED) 8 mg (DOUBLE CONCENTRATION) IV in sodium chloride 0.9% 250 mL 3 mcg/min Intravenous Rate/Dose Change Yaw Julian RN     06/16/2023 2308 EDT norepinephrine (LEVOPHED) 8 mg (DOUBLE CONCENTRATION) IV in sodium chloride 0.9% 250 mL 5 mcg/min Intravenous Rate/Dose Change Yaw Julian RN     06/16/2023 2247 EDT norepinephrine (LEVOPHED) 8 mg (DOUBLE CONCENTRATION) IV in sodium chloride 0.9% 250 mL 7 mcg/min Intravenous Rate/Dose Change Yaw Julian RN     06/16/2023 2214 EDT norepinephrine (LEVOPHED) 8 mg (DOUBLE CONCENTRATION) IV in sodium chloride 0.9% 250 mL 8 mcg/min Intravenous Rate/Dose Change Cisco Maya RN     06/16/2023 2139 EDT norepinephrine (LEVOPHED) 8 mg (DOUBLE CONCENTRATION) IV in sodium chloride 0.9% 250 mL 9 mcg/min Intravenous Rate/Dose Change Cisco Maya RN     06/16/2023 2000 EDT norepinephrine (LEVOPHED) 8 mg (DOUBLE CONCENTRATION) IV in sodium chloride 0.9% 250 mL 10 mcg/min Intravenous Rate/Dose Change Cisco Maya RN     06/16/2023 1830 EDT norepinephrine (LEVOPHED) 8 mg (DOUBLE CONCENTRATION) IV in sodium chloride 0.9% 250 mL 11 mcg/min Intravenous Rate/Dose Change Cisco Maya RN     06/16/2023 1744 EDT norepinephrine (LEVOPHED) 8 mg (DOUBLE CONCENTRATION) IV in sodium chloride 0.9% 250 mL 12 mcg/min Intravenous Rate/Dose Change Cisco Maya RN     06/16/2023 1720 EDT norepinephrine (LEVOPHED) 8 mg (DOUBLE CONCENTRATION) IV in sodium chloride 0.9% 250 mL 13 mcg/min Intravenous 860 [facility] JEET Braga     06/16/2023 1506 EDT norepinephrine (LEVOPHED) 8 mg (DOUBLE CONCENTRATION) IV in sodium chloride 0.9% 250 mL 14 mcg/min Intravenous Rate/Dose Change Cisco Maya RN     06/16/2023 1224 EDT norepinephrine (LEVOPHED) 8 mg (DOUBLE CONCENTRATION) IV in sodium chloride 0.9% 250 mL 15 mcg/min Intravenous Rate/Dose Change Arash Garcia, RN     06/17/2023 0540 EDT cefepime (MAXIPIME) 2 g/50 mL dextrose IVPB 2,000 mg Intravenous New Bag Sade Tam, RN     06/16/2023 1816 EDT cefepime (MAXIPIME) 2 g/50 mL dextrose IVPB 2,000 mg Intravenous New Bag Arash Garcia, RN     06/17/2023 1008 EDT methylPREDNISolone sodium succinate (Solu-MEDROL) injection 20 mg 20 mg Intravenous Given Arash Garcia, RN     06/16/2023 2055 EDT methylPREDNISolone sodium succinate (Solu-MEDROL) injection 20 mg 20 mg Intravenous Given Arash Garcia, RN     06/17/2023 1007 EDT fludrocortisone (FLORINEF) tablet 0.05 mg 0 mg Oral Hold Arash Garcia, RN     06/16/2023 2327 EDT calcium gluconate 1 g in sodium chloride 0.9% 50 mL (premix) 0 g Intravenous [facility]     06/16/2023 2354 EDT heparin (porcine) 25,000 units in 0.45% NaCl 250 mL infusion (premix) 6.1 Units/kg/hr Intravenous Restarted [facility]     06/16/2023 2248 EDT heparin (porcine) 25,000 units in 0.45% NaCl 250 mL infusion (premix) 0 Units/kg/hr Intravenous Hold Sade Yonatan, RN     06/16/2023 1558 EDT heparin (porcine) 25,000 units in 0.45% NaCl 250 mL infusion (premix) 9.1 Units/kg/hr Intravenous Rate/Dose Change Arash Garcia, RN     06/17/2023 0540 EDT insulin lispro (HumaLOG) 100 units/mL subcutaneous injection 2-12 Units 2 Units Subcutaneous Given [facility], RN     06/17/2023 0020 EDT insulin lispro (HumaLOG) 100 units/mL subcutaneous injection 2-12 Units 4 Units Subcutaneous Given Sade Mercers, RN     06/16/2023 1817 EDT insulin lispro (HumaLOG) 100 units/mL subcutaneous injection 2-12 Units 4 Units Subcutaneous Given [facility] Radha, RN     06/16/2023 1204 EDT insulin lispro (HumaLOG) 100 units/mL subcutaneous injection 2-12 Units 6 Units Subcutaneous Given Arash Garcia RN     06/17/2023 1006 EDT pantoprazole (PROTONIX) injection 40 mg 40 mg Intravenous Given Val Cardona, JEET     06/16/2023 2055 EDT pantoprazole (PROTONIX) injection 40 mg 40 mg Intravenous Given Val Cardona, JEET     06/16/2023 2327 EDT calcium gluconate 1 g in sodium chloride 0.9% 50 mL (premix) 0 g Intravenous Stopped Ginny Bell, JEET     06/16/2023 1412 EDT calcium gluconate 1 g in sodium chloride 0.9% 50 mL (premix) 1 g Intravenous [address]  Val Cardona, RN     06/16/2023 2300 EDT calcium gluconate 1 g in sodium chloride 0.9% 50 mL (premix) 0 g Intravenous Stopped Ginny Bell, JEET     06/16/2023 2105 EDT calcium gluconate 1 g in sodium chloride 0.9% 50 mL (premix) 1 g Intravenous New [address] [place] [address], RN     06/17/2023 0100 EDT sodium phosphate 6 mmol in sodium chloride 0.9 % 100 mL Infusion 0 mmol Intravenous Stopped Ginny Bell, JEET     06/16/2023 2159 EDT sodium phosphate 6 mmol in sodium chloride 0.9 % 100 mL Infusion 6 mmol Intravenous [facility], RN     06/16/2023 2337 EDT HYDROmorphone HCl (DILAUDID) injection 0.2 mg 0.2 mg Intravenous Given Ginny Bell, JEET     06/17/2023 0500 EDT sodium phosphate 6 mmol in sodium chloride 0.9 % 100 mL Infusion 0 mmol Intravenous Stopped Ginny Bell RN     06/17/2023 0158 EDT sodium phosphate 6 mmol in sodium chloride 0.9 % 100 mL Infusion 6 mmol Intravenous [address]  Ginny Bell, RN     06/17/2023 0200 EDT calcium gluconate 1 g in sodium chloride 0.9% 50 mL (premix) 0 g Intravenous Stopped Ginny Bell RN     06/17/2023 0104 EDT calcium gluconate 1 g in sodium chloride 0.9% 50 mL (premix) 1 g Intravenous [address]  Ginny Bell, RN     06/17/2023 0915 EDT calcium gluconate 1 g in sodium chloride 0.9% 50 mL (premix) 0 g Intravenous Stopped Val Cardona, JEET     06/17/2023 3975 EDT calcium gluconate 1 g in sodium chloride 0.9% 50 mL (premix) 1 g Intravenous [address],      06/17/2023 1309 EDT sodium phosphate 6 mmol in sodium chloride 0.9 % 100 mL Infusion 6 mmol Intravenous [address] Sabrina Craft RN     06/17/2023 1339 EDT HYDROmorphone HCl (DILAUDID) injection 0.2 mg 0.2 mg Intravenous Given Sabrina Craft RN     06/17/2023 1152 EDT cefTRIAXone (ROCEPHIN) 1,000 mg in dextrose 5 % 50 mL IVPB 1,000 mg Intravenous New Bag Tiesha Carvajal RN          Objective:     Vitals: Blood pressure 123/66, pulse 66, temperature (!) 97.2 °F (36.2 °C), resp. rate 18, height 6' 1" (1.854 m), weight (!) 140 kg (309 lb 8.4 oz), SpO2 99 %. ,Body mass index is 40.84 kg/m². Intake/Output Summary (Last 24 hours) at 6/17/2023 1155  Last data filed at 6/17/2023 1100  Gross per 24 hour   Intake 3872 ml   Output 4344 ml   Net -472 ml       Physical Exam:     General Appearance:   Alert, cooperative, no distress   HEENT:   Normocephalic, atraumatic, anicteric. Neck:  Supple, symmetrical, trachea midline   Lungs:   Clear to auscultation bilaterally; no rales, rhonchi or wheezing; respirations unlabored    Heart[de-identified]   Regular rate and rhythm; no murmur, rub, or gallop. Abdomen:   Soft, non-tender, non-distended; normal bowel sounds; no masses, no organomegaly    Genitalia:   Deferred    Rectal:   Deferred    Extremities:  No cyanosis, clubbing or edema    Pulses:  2+ and symmetric all extremities    Skin:  No jaundice, rashes, or lesions    Lymph nodes:  No palpable cervical lymphadenopathy        Invasive Devices     Peripheral Intravenous Line  Duration           Peripheral IV 06/14/23 Left Antecubital 2 days    Peripheral IV 06/14/23 Right;Ventral (anterior) Forearm 2 days          Arterial Line  Duration           Arterial Line 06/14/23 Radial 2 days          Hemodialysis Catheter  Duration           HD Temporary Double Catheter 3 days          Drain  Duration           Urethral Catheter Temperature probe 16 Fr. 2 days                Lab Results:  No results displayed because visit has over 200 results.           Imaging Studies: I have personally reviewed pertinent imaging studies.

## 2023-06-18 PROBLEM — E83.42 HYPOMAGNESEMIA: Status: RESOLVED | Noted: 2022-10-19 | Resolved: 2023-06-18

## 2023-06-18 PROBLEM — E87.5 HYPERKALEMIA: Status: RESOLVED | Noted: 2023-06-15 | Resolved: 2023-06-18

## 2023-06-18 PROBLEM — E83.39 HYPERPHOSPHATEMIA: Status: RESOLVED | Noted: 2023-06-15 | Resolved: 2023-06-18

## 2023-06-18 NOTE — PROGRESS NOTES
81 year with DM2, CKD III, Afib on warfarin, HFpEF, prior SBE requiring bioprosthetic AoVR 2014 presented 2 days weakness and fatigue.  Found to have severe hypoglycemia, hyperkalemia, REMA, severe lactic acidosis, coagulopathy.  Medically managed hyperkalemia, admitted to ICU, given PCC/Vit K, CVC placed and started on IHD then transitioned to CRRT.  Hypotension and started vasopressors. Suspected UTI inciting event with medication accumulation including metformin/sotolol.        24hr events - transfused 1unit PRBCs yesterday, remained off vasopressors, CRRT neg 25/hr, he reports feeling improved, notes continued fatigue and generalized weakness but this is improving, slight improvement in appetite    VS AF, HR 60-70's, MAPS 60-80's, SpO2 99% 1LNC, I/Os -400cc  Exam  GEN obese older man in chair, awake, interactive, nontoxic but fatigued  HEENT ATNC, mmm, no thrush  NECK no accessory muscle use  CV reg, single s1/2, II/VI AMADOU  Pulm diminished at bases, no wheeze or rales  ABD obese +BS soft NTND, nor ebound  EXT warm, brisk cap refill 2-3+ LE edema to thighs with R>L CVS changes, RLE wound with dressing in place    alfred in place with minimal to no urine    Laboratory and Diagnostics  Results from last 7 days   Lab Units 06/18/23  0617 06/18/23  0011 06/17/23  1217 06/17/23  0535 06/16/23  2033 06/16/23  1623 06/16/23  0550 06/16/23  0103 06/15/23  2010 06/15/23  1949 06/15/23  0526 06/15/23  0233 06/14/23  2249 06/14/23  2235 06/14/23  1520   WBC Thousand/uL 16.47*  --  11.98* 10.68*  --   --  13.25* 14.99* 14.86*  --  11.24* 9.19  --  6.14 6.08   HEMOGLOBIN g/dL 7.6* 7.3* 7.9* 6.9* 7.3* 7.5* 8.2* 7.9* 8.7*  --  7.8* 8.2*  --  8.4* 9.4*   I STAT HEMOGLOBIN   --   --   --   --   --   --   --   --   --    < >  --   --    < >  --   --    HEMATOCRIT % 24.3* 23.1* 24.9* 22.4* 22.6* 23.0* 25.7* 25.4* 28.3*  --  26.0* 26.3*  --  27.6* 31.7*   HEMATOCRIT, ISTAT   --   --   --   --   --   --   --   --   --    < >  --   -- < >  --   --    PLATELETS Thousands/uL 61*  --  78* 88*  --   --  123* 138* 171  --  180 203  --  266 274   NEUTROS PCT %  --   --   --  85*  --   --   --   --  91*  --  82* 82*  --  67 66   BANDS PCT %  --   --   --   --   --   --   --  15*  --   --   --   --   --   --   --    MONOS PCT %  --   --   --  7  --   --   --   --  3*  --  8 7  --  12 10   MONO PCT %  --   --   --   --   --   --   --  0*  --   --   --   --   --   --   --    EOS PCT %  --   --   --  0  --   --   --  0 0  --  1 0  --  0 0    < > = values in this interval not displayed.      Results from last 7 days   Lab Units 06/18/23  0617 06/18/23  0011 06/17/23  1822 06/17/23  1217 06/17/23  0535 06/17/23  0013 06/16/23  1811 06/16/23  0103 06/15/23  2010 06/14/23  2249 06/14/23  2235 06/14/23  1851 06/14/23  1520   SODIUM mmol/L 136 136 135 135 137 135 134*   < > 132*   < > 132*   < > 130*   POTASSIUM mmol/L 4.0 4.1 4.1 4.2 4.2 4.3 4.4   < > 5.4*   < > 6.9*   < > 7.4*   CHLORIDE mmol/L 106 107 106 105 105 103 103   < > 100   < > 104   < > 102   CO2 mmol/L 27 29 27 28 28 28 26   < > 18*   < > 11*   < > 12*   CO2, I-STAT   --   --   --   --   --   --   --   --   --    < >  --   --   --    ANION GAP mmol/L 3* 0* 2* 2* 4 4 5   < > 14*   < > 17*   < > 16*   BUN mg/dL 14 15 15 17 20 23 24   < > 34*   < > 80*   < > 81*   CREATININE mg/dL 1.36* 1.45* 1.61* 1.74* 1.99* 2.22* 2.68*   < > 3.89*   < > 8.19*   < > 8.70*   CALCIUM mg/dL 9.0 8.6 9.0 9.0 8.8 8.6 8.8   < > 9.1   < > 9.5   < > 9.2   GLUCOSE RANDOM mg/dL 149* 147* 174* 171* 168* 207* 225*   < > 203*   < > 92   < > 43*   ALT U/L  --   --   --   --   --   --   --   --  30  --  20  --  19   AST U/L  --   --   --   --   --   --   --   --  58*  --  33  --  28   ALK PHOS U/L  --   --   --   --   --   --   --   --  101  --  92  --  105   ALBUMIN g/dL  --   --   --   --   --   --   --   --  2.8*  --  2.8*  --  3.1*   TOTAL BILIRUBIN mg/dL  --   --   --   --   --   --   --   --  1.86*  --  0.90  --  1.06*    < > = values in this interval not displayed. Results from last 7 days   Lab Units 06/18/23  0617 06/18/23  0011 06/17/23  1822 06/17/23  1217 06/17/23  0535 06/17/23  0013 06/16/23  1811   MAGNESIUM mg/dL 2.2 1.9 2.1 1.9 2.0 2.0 2.2   PHOSPHORUS mg/dL 2.3 2.2* 2.3 2.4 2.3 2.3 2.3      Results from last 7 days   Lab Units 06/18/23  0617 06/17/23  0535 06/16/23  2211 06/16/23  1433 06/16/23  0820 06/16/23  0550 06/15/23  0503 06/14/23  2240 06/14/23  2235 06/14/23  2045 06/14/23  1851   INR  1.61* 1.57*  --   --  1.94* 1.95* 2.51* 2.35* 2.50* >15.00* >15.00*   PTT seconds  --  111* 154* 102* 45*  --  43*  --   --  74* 77*          Results from last 7 days   Lab Units 06/17/23  1217 06/17/23  0535 06/17/23  0013 06/16/23  2033 06/16/23  1811 06/16/23  0933 06/16/23  0550   LACTIC ACID mmol/L 1.6 1.8 2.5* 3.6* 4.6* 5.1* 5.7*             Results from last 7 days   Lab Units 06/17/23  1217   FIBRINOGEN mg/dL 234         Results from last 7 days   Lab Units 06/14/23  1851   PROCALCITONIN ng/ml 0.25       ABG:   Results from last 7 days   Lab Units 06/16/23  0108   PH ART  7.334*   PCO2 ART mm Hg 36.6   PO2 ART mm Hg 71.2*   HCO3 ART mmol/L 19.0*   BASE EXC ART mmol/L -6.2   ABG SOURCE  Line, Arterial     6/17 - 234, hemolysis smear - no schistocytes    MICRO  MRSA negative   Urine Cx pending - large LE, inumm WBC, mod bacteria - 50-59K GNR enteric like - MDRO E.coli   Bld Cx 6/14 NGTD     RADIOGRAPHS - new images personally reviewed  CXR 6/15 - AICD in place, left HD cath and RIJ introducer cath in good position, CM, blunted CP angles, cardiomegaly, no PTX     CXR 6/14 - AICD in place T/L in good position, central vascular congestion, blunted CP Angles, no PTX     CT abd/pelvis 6/14 small right effusion small ascites, no hydro, stable perinephric/ureteral fat stranding, no abscess      TTE 6/2023 - EF 70%, RV volume overload, RVSP 73mmHG,      TTE 5/2022 EF 60%, mod dilated RV, bioprosthetic  AoVR      Assessment  1.  Shock - suspected mixed septic shock, acidosis, and hypovolemic (hypovolemic resolved) - improving  2. SIRS/Sepsis - POA - temp/RR/Lactate  3. Suspected Urinary tract infection - MDRO E.coli   4. REMA/CKD with severe hyperkalemia  - on CRRT   5. Severe acidosis elevated lactate, hypoglycemia - concern for metformin toxicity -  improving  6. Bradycardia - improved  7. Coagulopathy on warfarin - resolved  8. Hyponatremia - improved  9. Toxic/metabolic encepalopathy  10. Bioprosthetic AoVR  11. Anemia - suspected acute blood loss with filter clotting - less likely GIB  12. Thrombocytopenia - likely related to CRRT and filter clotting  13. Suspected GIB  14. DM  15. Diastolic CHF  16. Atrial fibrillation   17.  Chronic LE wound and lymphedema     PLAN  • NEURO - delirium precautions, overall improved  • CARDIAC - goal MAP >65, remains off vasopressors, holding BP meds, holding systemic AC for now  • PULM - IS, OOB-chair as able, chronic effusion will not sample, wean off O2  • GI - continue BID PPI, GI consulted, holding endoscopy now given HD stability   • RENAL - continue CRRT - increase UF to -50cc/hr - continue until Monday then determine IHD needs - will need perm cath - consult IR, d/c alfred and bladder scan qshift  • ENDO - holding metformin, checking  admission metformin level, AC/qhs FSBS ISS  • HEME - holding warfarin, may consider pre-filter heparin if clotting noted and not on systemic UFH gtt, no evidence of hemolysis, trend plts  • ID - continue Abx D#5/7, transition to CTX - increase to 2gms  • ICU Care  SCDs, resume UFH TID - consider gtt in future if plts rising or filter clotting issues, PPI, CHG  • Wound Care  • Full Code  • Family updated at bedside    Critical Care time excluding procedures teaching and updates is 35 minutes    Sherice Jean DO

## 2023-06-18 NOTE — TELEMEDICINE
e-Consult (IPC)  - Interventional Radiology  Milli Harkins 81 y.o. male MRN: [id number]  [facility]: [id number]          Interventional Radiology has been consulted to evaluate Milli Harkins    We were consulted by Critical care concerning this patient with renal failure. Inpatient Consult to IR  Consult performed by: Mc Zamarripa MD  Consult ordered by: Sourav Bautista MD        06/18/23    Assessment/Recommendation:   50-year-old male with acute kidney injury, currently on CVVH. The plan is to transition him to hemodialysis on Monday, and access has been requested. Orders have been placed. An attempt will be made in the late afternoon to place a tunneled dialysis catheter. This will be dependent on the IR schedule, and may need to be delayed. 11-20 minutes, >50% of the total time devoted to medical consultative verbal/EMR discussion between providers. Written report will be generated in the EMR. Thank you for allowing Interventional Radiology to participate in the care of Milli Harkins. Please don't hesitate to call or TigerText us with any questions.      Mc Zamarripa MD

## 2023-06-18 NOTE — QUICK NOTE
Filter down due to patient being in seated position. 170cc estimated blood loss in the filter, Will check H/H and next blood draw (6pm) and plan for transfusion if hgb <7.0.

## 2023-06-18 NOTE — PROGRESS NOTES
Procedure Note - Nephrology   Shelli Covert 81 y.o. male MRN: [id number]  Unit/Bed#: MICU 13 Encounter: [id number]      Assessment / Plan:  1.  Oligoanuric acute kidney injury complicated by severe hyperkalemia and metabolic acidosis  -REMA likely due to septic ATN from urinary source plus or minus diuretic use plus or minus contrast associated nephropathy  - Status post IV dye with CT abdomen and pelvis on June 7  - Patient status post emergent hemodialysis therapy June 14, 2023 for hyperkalemia  - CRRT initiated, patient on 4K/3 calcium bath with even UF as patient hemodynamically unstable on pressors - will continue this  - Continue to monitor serial labs  -Patient seen and examined by me on CRRT today  - Continue to monitor for signs of renal recovery  - monitor bladder scans, remove alfred  - CK level normal  - Baseline serum creatinine previously 1.3-1.6  - consider transition to IHD 6/19 Monday if patient remains off pressors.      2.  Severe hyperkalemia in the setting of severe REMA- resolved status post emergent HD treatment as well as CRRT initiation.  Potassium improving, continue 4K bath     3.  Anion gap acidosis in the setting of lactic acidosis-improving with CRRT and bicarb gtt. monitor off bicarb gtt.     4.  Hyponatremia-likely dilutional in the setting of REMA and decreased free water excretion- resolved on CRRT     5.  Hyperphosphatemia- resolved on CRRT, now with low phos, replete per protocol and monitor this     6.  Hypomagnesemia-resolved s/p repletion     7.  Anemia in the setting of critical illness as well as history of monoclonal gammopathy and melena-hemoglobin 7.6, monitor CBC, serum immunofixation performed October 2022 shows monoclonal gammopathy as IgM lambda, consider outpatient hematology consultation. GI consulted for melena.  No urgent plans for EGD     8.  Hypotension in the setting of septic shock-off pressors per ICU team, maintain MAP greater than 65 for renal perfusion, resolved     9.  Sepsis likely due to UTI-on antibiotics per ICU team, CT does not show acute abdominal or pelvic pathology, small abdominal and pelvic ascites noted with unchanged bilateral perinephric and periureteral fat stranding as well as small to moderate right pleural effusion     10.  History of diastolic CHF-monitor volume status closely on bicarbonate drip, diuretics on hold in light of REMA, monitor daily weight        Subjective:   Patient seen and examined by me on CRRT at approximately 12:22 PM.  Blood pressure 134/54, UF goal -50 mL/h, denies chest pain or shortness of breath. Remains anuric. Objective:     Vitals: Blood pressure 123/66, pulse 64, temperature (!) 97.2 °F (36.2 °C), resp. rate 16, height 6' 1" (1.854 m), weight (!) 139 kg (306 lb 7 oz), SpO2 100 %. ,Body mass index is 40.43 kg/m². Temp (24hrs), Av.6 °F (36.4 °C), Min:97.2 °F (36.2 °C), Max:97.9 °F (36.6 °C)      Weight (last 2 days)     Date/Time Weight    23 0600 139 (306.44)    23 0546 140 (309.53)            Intake/Output Summary (Last 24 hours) at 2023 1404  Last data filed at 2023 0900  Gross per 24 hour   Intake 1271.33 ml   Output 1608 ml   Net -336.67 ml     I/O last 24 hours: In: 1941.7 [P.O.:270; I.V.:100.4; Blood:320; IV Piggyback:1251.3]  Out: 2325 [Urine:70; Other:2255]        Physical Exam:   Physical Exam  Vitals reviewed. Constitutional:       General: He is not in acute distress. Appearance: Normal appearance. He is well-developed. He is not diaphoretic. Comments: Sitting up    HENT:      Head: Normocephalic and atraumatic. Nose: Nose normal.      Mouth/Throat:      Mouth: Mucous membranes are moist.      Pharynx: No oropharyngeal exudate. Eyes:      General: No scleral icterus. Right eye: No discharge. Left eye: No discharge. Comments: eyeglasses   Neck:      Thyroid: No thyromegaly.    Cardiovascular:      Rate and Rhythm: Normal rate and regular rhythm. Heart sounds: Normal heart sounds. Pulmonary:      Effort: Pulmonary effort is normal.      Breath sounds: No wheezing or rales. Comments: Coarse BS b/l  Abdominal:      General: Bowel sounds are normal. There is no distension. Palpations: Abdomen is soft. Tenderness: There is no abdominal tenderness. Genitourinary:     Comments: alfred  Musculoskeletal:         General: Normal range of motion. Cervical back: Neck supple. Lymphadenopathy:      Cervical: No cervical adenopathy. Skin:     General: Skin is warm and dry. Findings: No rash. Neurological:      General: No focal deficit present. Mental Status: He is alert.       Comments: awake   Psychiatric:         Mood and Affect: Mood normal.         Behavior: Behavior normal.         Invasive Devices     Peripheral Intravenous Line  Duration           Peripheral IV 06/14/23 Left Antecubital 3 days    Peripheral IV 06/18/23 Right Antecubital <1 day          Arterial Line  Duration           Arterial Line 06/14/23 Radial 3 days          Hemodialysis Catheter  Duration           HD Temporary Double Catheter 4 days          Drain  Duration           Urethral Catheter Temperature probe 16 Fr. 3 days                Medications:    Scheduled Meds:  Current Facility-Administered Medications   Medication Dose Route Frequency Provider Last Rate   • acetaminophen  650 mg Oral Q6H PRN Tommy March MD     • cefTRIAXone  2,000 mg Intravenous Q24H Aydee Martin, DO 2,000 mg (06/18/23 1222)   • chlorhexidine  15 mL Mouth/Throat Q12H [address], MD     • cholecalciferol  1,000 Units Oral Daily Laura Page MD     • diphenhydramine, lidocaine, Al/Mg hydroxide, simethicone  10 mL [employer] Spit Q6H PRN Sandi Benitez PA-C     • heparin (porcine)  7,500 Units Subcutaneous Q8H [address], MD     • HYDROmorphone  0.2 mg Intravenous Q3H PRN Ortiz Lowery MD     • insulin lispro  2-12 Units Subcutaneous 4x Daily (with meals and at bedtime) Betsy Mcintosh MD     • lidocaine  1 patch Topical Daily Michell Villar MD     • norepinephrine  1-30 mcg/min Intravenous Titrated Jose Biggs MD Stopped (06/17/23 0306)   • NxStage K 4/Ca 3  20,000 mL Dialysis Continuous Darlin K DO Sandra     • pantoprazole  40 mg Intravenous Q12H [address] Het D Javier DO     • trimethobenzamide  200 mg Intramuscular Q6H PRN Isak Garza MD     • vasopressin  0.04 Units/min Intravenous Continuous Jose Biggs MD Stopped (06/17/23 1301)     Facility-Administered Medications Ordered in Other Encounters   Medication Dose Route Frequency Provider Last Rate   • sodium chloride  75 mL/hr Intravenous Continuous Cristiane Pearson MD Stopped (05/26/23 1426)       PRN Meds:.•  acetaminophen  •  diphenhydramine, lidocaine, Al/Mg hydroxide, simethicone  •  HYDROmorphone  •  trimethobenzamide    Continuous Infusions:norepinephrine, 1-30 mcg/min, Last Rate: Stopped (06/17/23 0306)  NxStage K 4/Ca 3, 20,000 mL  vasopressin, 0.04 Units/min, Last Rate: Stopped (06/17/23 1301)            LAB RESULTS:      Results from last 7 days   Lab Units 06/18/23  1240 06/18/23  0617 06/18/23  0011 06/17/23  1822 06/17/23  1217 06/17/23  0535 06/17/23  0013 06/16/23  2033 06/16/23  1811 06/16/23  1623 06/16/23  1206 06/16/23  0550 06/16/23  0103 06/15/23  2010 06/15/23  1949 06/15/23  1201 06/15/23  0526 06/15/23  0503 06/15/23  0233 06/15/23  0032 06/14/23  2249 06/14/23  2235 06/14/23  1851 06/14/23  1520   0000   WBC Thousand/uL  --  16.47*  --   --  11.98* 10.68*  --   --   --   --   --  13.25* 14.99* 14.86*  --   --  11.24*  --  9.19  --   --  6.14  --  6.08  --    HEMOGLOBIN g/dL  --  7.6* 7.3*  --  7.9* 6.9*  --  7.3*  --  7.5*  --  8.2* 7.9* 8.7*  --   --  7.8*  --  8.2*  --   --  8.4*  --  9.4*  --    I STAT HEMOGLOBIN g/dl  --   --   --   --   --   --   --   --   --   --   --   --   --   --  8.5*  --   --   --   --   --  8.8*  -- --   --    < >   HEMATOCRIT %  --  24.3* 23.1*  --  24.9* 22.4*  --  22.6*  --  23.0*  --  25.7* 25.4* 28.3*  --   --  26.0*  --  26.3*  --   --  27.6*  --  31.7*  --    HEMATOCRIT, ISTAT %  --   --   --   --   --   --   --   --   --   --   --   --   --   --  25*  --   --   --   --   --  26*  --   --   --    < >   PLATELETS Thousands/uL  --  61*  --   --  78* 88*  --   --   --   --   --  123* 138* 171  --   --  180  --  203  --   --  266  --  274  --    NEUTROS PCT %  --   --   --   --   --  85*  --   --   --   --   --   --   --  91*  --   --  82*  --  82*  --   --  67  --  66  --    LYMPHS PCT %  --   --   --   --   --  7*  --   --   --   --   --   --   --  5*  --   --  8*  --  10*  --   --  19  --  22  --    LYMPHO PCT %  --   --   --   --   --   --   --   --   --   --   --   --  1*  --   --   --   --   --   --   --   --   --   --   --   --    MONOS PCT %  --   --   --   --   --  7  --   --   --   --   --   --   --  3*  --   --  8  --  7  --   --  12  --  10  --    MONO PCT %  --   --   --   --   --   --   --   --   --   --   --   --  0*  --   --   --   --   --   --   --   --   --   --   --   --    EOS PCT %  --   --   --   --   --  0  --   --   --   --   --   --  0 0  --   --  1  --  0  --   --  0  --  0  --    POTASSIUM mmol/L 4.0 4.0 4.1 4.1 4.2 4.2 4.3  --    < >  --    < > 5.1 5.1 5.4*  --    < >  --    < > 5.3   < >  --  6.9*   < > 7.4*  --    CHLORIDE mmol/L 109* 106 107 106 105 105 103  --    < >  --    < > 101 101 100  --    < >  --    < > 98   < >  --  104   < > 102  --    CO2 mmol/L 28 27 29 27 28 28 28  --    < >  --    < > 20* 20* 18*  --    < >  --    < > 15*   < >  --  11*   < > 12*  --    CO2, I-STAT mmol/L  --   --   --   --   --   --   --   --   --   --   --   --   --   --  23  --   --   --   --   --  12*  --   --   --    < >   BUN mg/dL 12 14 15 15 17 20 23  --    < >  --    < > 33* 33* 34*  --    < >  --    < > 60*   < >  --  80*   < > 81*  --    CREATININE mg/dL 1.34* 1.36* 1.45* 1.61* 1.74* 1.99* 2.22*  --    < >  --    < > 3.35* 3.72* 3.89*  --    < >  --    < > 6.06*   < >  --  8.19*   < > 8.70*  --    CALCIUM mg/dL 8.7 9.0 8.6 9.0 9.0 8.8 8.6  --    < >  --    < > 9.1 9.0 9.1  --    < >  --    < > 8.9   < >  --  9.5   < > 9.2  --    ALK PHOS U/L  --   --   --   --   --   --   --   --   --   --   --   --   --  101  --   --   --   --   --   --   --  92  --  105  --    ALT U/L  --   --   --   --   --   --   --   --   --   --   --   --   --  30  --   --   --   --   --   --   --  20  --  19  --    AST U/L  --   --   --   --   --   --   --   --   --   --   --   --   --  58*  --   --   --   --   --   --   --  33  --  28  --    GLUCOSE, ISTAT mg/dl  --   --   --   --   --   --   --   --   --   --   --   --   --   --  194*  --   --   --   --   --  91  --   --   --   --    MAGNESIUM mg/dL 2.0 2.2 1.9 2.1 1.9 2.0 2.0  --    < >  --    < > 2.4 2.4 2.3  --    < >  --    < > 1.4*   < >  --  1.9  --  2.1  --    PHOSPHORUS mg/dL 1.8* 2.3 2.2* 2.3 2.4 2.3 2.3  --    < >  --    < > 3.7 3.9 4.5*  --    < >  --    < > 5.8*   < >  --  7.7*  --   --   --     < > = values in this interval not displayed. CUTURES:  Lab Results   Component Value Date    BLOODCX No Growth at 72 hrs. 06/14/2023    BLOODCX No Growth at 72 hrs. 06/14/2023    BLOODCX No Growth After 5 Days. 04/04/2023    BLOODCX No Growth After 5 Days. 04/04/2023    BLOODCX No Growth After 5 Days. 11/04/2022    BLOODCX No Growth After 5 Days. 11/04/2022    BLOODCX No Growth After 5 Days. 10/21/2022    BLOODCX No Growth After 5 Days.  10/21/2022    URINECX 50,000-59,000 cfu/ml Escherichia coli (A) 06/14/2023    URINECX 10,000-19,000 cfu/ml Klebsiella oxytoca (A) 06/14/2023    URINECX (A) 06/14/2023     <10,000 cfu/ml Alpha Hemolytic Streptococcus NOT Enterococcus    URINECX No Growth <1000 cfu/mL 07/09/2019    URINECX >100,000 cfu/ml Escherichia coli 05/19/2017    URINECX No Growth <1000 cfu/mL 04/20/2017    URINECX 5925-3718 cfu/ml Gram Negative Miquel 04/02/2017                 Portions of the record may have been created with voice recognition software. Occasional wrong word or "sound a like" substitutions may have occurred due to the inherent limitations of voice recognition software. Read the chart carefully and recognize, using context, where substitutions have occurred. If you have any questions, please contact the dictating provider.

## 2023-06-18 NOTE — PROGRESS NOTES
[address] [place] [address]  Progress Note: Critical Care  Name: Yomaira Oconnor 81 y.o. male I MRN: [id number]  Unit/Bed#: MICU 15 I Date of Admission: 6/14/2023   Date of Service: 6/18/2023 I Hospital Day: 4    Assessment/Plan   1. Shock - suspected mixed septic shock, and hypovolemic  2. SIRS Sepsis from suspected Urinary tract infection  3. REMA/CKD with severe hyperkalemia   4. Severe acidosis elevated lactate, hypoglycemia - suspected metformin toxicity   5. Upper GI Bleed  6. Hyponatremia - improving  7. Toxic/metabolic encephalopathy resolved  8. Bioprosthetic AVR  9. Anemia   10. DM  11. Diastolic CHF  12. Atrial fibrillation   13. Chronic LE wound and lymphedema    Neuro:   Diagnosis: Toxic metabolic encephalopathy, resolved, likely due to bradycardia, hypoglycemia, in the setting of sepsis, REMA and Electrolyte Imbalance evidenced by Dysarthria, treated with Hemodialysis and Frequent Neuro Checks  ? Plan: Q4H neuro check  • Diagnosis: analgesia  ? Plan: PRN tylenol, lidocaine patch     CV:   • Diagnosis: septic shock, UTI  ? Plan: Wean norepinephrine gtt to maintain MAP > 65; continue stress dose steroids, if able to wean pressors we will start UF 25 to 50 cc an hour  • Diagnosis: history of diastolic CHF EF 90%   ? Last echo in 2022; home medications include torsemide 40 twice daily   ? Echo this admission EF of 70% with signs of volume overload  ? Hold home torsemide while on CVVH  • Diagnosis: history of PAF  ? Plan: hold home sotolol  ? 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 Patient interrogated with no findings  • Diagnosis: History of HLD on atorvastatin 40  ? Plan: hold home statin  • Diagnosis: hypothermia in setting of septic shock  ?  Plan: caitlin bal     Pulm:  • Diagnosis: R pleural effusion, chronic; transiently required bipap, to NC, now RA for SpO2<88%  ? Plan: supplemental O2 PRN to maintain SpO2 > 88 %     GI:   • Diagnosis: nausea, resolved  ? Plan: ondansetron PRN  • Diagnosis: history of GERD  ? 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 Suspected ATN secondary to contrast about 7 days ago, along with suspected septic shock, along with metformin toxicity  ? Plan: strict I/Os  ? CVVH @ -50cc /hr  ? Pending metformin level  ? We will start UF once pressors are weaned  ? Q6H BMP, Mg, Phos  • Diagnosis: AGMA  ? Anion gap is closed with improving bicarb  ? Plan: bicarbonate gtt and will begin to wean once bicarb is WNL  ? Trend LA and BMP and will plan to wean bicarb  • Diagnosis: hyperkalemia, resolved  ? Plan: CVVH  • Diagnosis: BPH  ? Plan: alfred removed as patient not producing significant urine. Will bladder scan q6h to monitor for retention.         F/E/N:   • Plan:   • Q6H BMP, bicarb drip off  • Will place speech eval for concern for aspiration        Heme/Onc:   • Diagnosis: coagulopathy, supratherapeutic INR on presentation; resolved; S/p VitK, Kcentra              Plan: will transition to heparin subq TID weight based dosing  • Diagnosis: history of anemia due to chronic disease with suspected acute blood loss anemia  ? Plan: daily CBC  ? Transfuse for hgb < 7  • Diagnosis: IgM monoclonal gammopathy  • Noted on serum immunofixation performed 10/2022  ?  Plan:  ? Will need outpatient heme onc     Endo:   • Diagnosis: hypoglycemia in setting of septic shock  ? Plan: POC glucose QID  • Diagnosis: DM2  ? Plan: hold home metformin  ? SSI        ID:   • Diagnosis: UTI  ? Urine culture showed 50,000-59,000 E. coli, 10 19,000 Klebsiella, and less than 100,000 alphahemolytic strep non-Enterococcus  ? Blood cultures x2 from 6/14 negative  ? blood cultures x2 from 6/16 negative; vanc stopped 6/16  ? Plan: Currently on ceftriaxone, continue        MSK/Skin:   • Diagnosis: back pain, chronic  ? Plan: analgesia as above  • Diagnosis: non-healing RLE wound  ? Plan: local wound care      ICU Core Measures     A: Assess, Prevent, and Manage Pain · Has pain been assessed? Yes  · Need for changes to pain regimen? No   B: Both SAT/SAT  · N/A   C: Choice of Sedation · RASS Goal: 0 Alert and Calm  · Need for changes to sedation or analgesia regimen? No   D: Delirium · CAM-ICU: Negative   E: Early Mobility  · Plan for early mobility? Yes   F: Family Engagement · Plan for family engagement today? Yes       Antibiotic Review: Patient on appropriate coverage based on culture data. Review of Invasive Devices: Combs Plan: Continue for accurate I/O monitoring for 48 hours  Central access plan: HD cath in place. Plan continue  [place] Plan: Keep arterial line for hemodynamic monitoring and frequent ABGs    Prophylaxis:  VTE VTE covered by:    Lloyd Chan       Stress Ulcer  covered bypantoprazole (PROTONIX) injection 40 mg [[id number]]          Subjective    The patient is an 50-year-old male with past medical history of diastolic CHF, CKD 3, type 2 diabetes, paroxysmal atrial fibrillation, H/o endocarditis and VT with AVR bioprosthetic and bivent ICD, hyperlipidemia, BPH who presented to the ED for 2-day history of generalized weakness, loss balance, fatigue, and loss of appetite. Found to have severe REMA, electrolyte abnormalities lactic acidosis, and therapeutic INR on warfarin.   Status post hemodialysis to now CRRT, significant improvement of electrolyte abnormalities, persistent lactic acidosis, and reversal of warfarin started on anticoagulation complicated by upper GI bleed. Found to have UTI on antibiotics multifactorial shock with decreasing requirement of pressors and on steroids. Review of Systems   Constitutional: Positive for fatigue. Negative for chills, diaphoresis and fever. Respiratory: Positive for shortness of breath (when off oxygen). Negative for cough and wheezing. Cardiovascular: Positive for leg swelling. Negative for chest pain. Gastrointestinal: Negative for abdominal distention, abdominal pain, constipation, diarrhea and nausea. Genitourinary: Negative for difficulty urinating. Neurological: Negative for dizziness. Objective                            Vitals I/O      Most Recent Min/Max in 24hrs   Temp (!) 97.2 °F (36.2 °C) Temp  Min: 97.2 °F (36.2 °C)  Max: 97.9 °F (36.6 °C)   Pulse 64 Pulse  Min: 60  Max: 78   Resp (!) 7 Resp  Min: 7  Max: 26   /66 No data recorded   O2 Sat 99 % SpO2  Min: 90 %  Max: 100 %      Intake/Output Summary (Last 24 hours) at 6/18/2023 0843  Last data filed at 6/18/2023 0600  Gross per 24 hour   Intake 1424.03 ml   Output 1957 ml   Net -532.97 ml         Diet Dysphagia/Modified Consistency; Dysphagia 1-Pureed; Honey Thick Liquid     Invasive Monitoring Physical exam   Arterial Line  [place] /54  Arterial Line BP  Min: 92/82  Max: 142/60   MAP 76 mmHg  Arterial Line MAP (mmHg)  Min: 60 mmHg  Max: 88 mmHg    Physical Exam  Constitutional:       General: He is not in acute distress. Appearance: Normal appearance. He is not ill-appearing or toxic-appearing. HENT:      Head: Normocephalic and atraumatic. Right Ear: External ear normal.      Left Ear: External ear normal.   Eyes:      Extraocular Movements: Extraocular movements intact. Pupils: Pupils are equal, round, and reactive to light. Cardiovascular:      Rate and Rhythm: Normal rate and regular rhythm. Heart sounds: No murmur heard.      No gallop. Pulmonary:      Effort: No respiratory distress. Breath sounds: No wheezing or rales. Abdominal:      General: Abdomen is flat. There is no distension. Tenderness: There is no abdominal tenderness. There is no guarding. Musculoskeletal:         General: No tenderness. Right lower leg: Edema present. Left lower leg: Edema present. Skin:     General: Skin is warm. Neurological:      Mental Status: He is alert and oriented to person, place, and time.    Psychiatric:         Mood and Affect: Mood normal.         Behavior: Behavior normal.            Diagnostic Studies         Medications:  Scheduled PRN   cefTRIAXone, 1,000 mg, Q24H  chlorhexidine, 15 mL, Q12H AMANDA  cholecalciferol, 1,000 Units, Daily  insulin lispro, 2-12 Units, Q6H AMANDA  lidocaine, 1 patch, Daily  pantoprazole, 40 mg, Q12H AMANDA  sodium phosphate, 6 mmol, Once      acetaminophen, 650 mg, Q6H PRN  diphenhydramine, lidocaine, Al/Mg hydroxide, simethicone, 10 mL, Q6H PRN  HYDROmorphone, 0.2 mg, Q3H PRN  trimethobenzamide, 200 mg, Q6H PRN       Continuous    norepinephrine, 1-30 mcg/min, Last Rate: Stopped (06/17/23 0306)  NxStage K 4/Ca 3, 20,000 mL  vasopressin, 0.04 Units/min, Last Rate: Stopped (06/17/23 1301)         Labs:    CBC    Recent Labs     06/17/23  1217 06/18/23  0011 06/18/23  0617   WBC 11.98*  --  16.47*   HGB 7.9* 7.3* 7.6*   HCT 24.9* 23.1* 24.3*   PLT 78*  --  61*     BMP    Recent Labs     06/18/23  0011 06/18/23  0617   SODIUM 136 136   K 4.1 4.0    106   CO2 29 27   AGAP 0* 3*   BUN 15 14   CREATININE 1.45* 1.36*   CALCIUM 8.6 9.0       Coags    Recent Labs     06/16/23  2211 06/17/23  0535 06/18/23  0617   INR  --  1.57* 1.61*   * 111*  --         Additional Electrolytes  Recent Labs     06/18/23  0011 06/18/23  0617   MG 1.9 2.2   PHOS 2.2* 2.3   CAIONIZED 1.17 1.18          Blood Gas    No recent results  No recent results LFTs  No recent results    Infectious  No recent results Glucose  Recent Labs     06/17/23  1217 06/17/23  1822 06/18/23  0011 06/18/23  0617   GLUC 171* 174* 147* 149*                   Tl Bautista MD

## 2023-06-19 PROBLEM — D69.6 THROMBOCYTOPENIA (HCC): Status: ACTIVE | Noted: 2023-01-01

## 2023-06-19 PROBLEM — A41.9 SEPTIC SHOCK (HCC): Status: ACTIVE | Noted: 2023-01-01

## 2023-06-19 PROBLEM — R65.21 SEPTIC SHOCK (HCC): Status: ACTIVE | Noted: 2023-06-19

## 2023-06-19 NOTE — ASSESSMENT & PLAN NOTE
Assessment:   · Patient with chronic macrocytic anemia likely due to multiple hospitalizations requiring frequent phlebotomy, new severe REMA suppressing RBC production, likely some degree of iron deficiency, loss of blood in CRRT   · Baseline hemoglobin: 9.1 - 7.1  · Initial hemoglobin: 9.4 (6/14/23)  · Last hemoglobin: 7.6 (6/27/23)  · 6/17/23 1u PRBC  · 6/20/23 1u PRBC      Plan:   · Transfuse for hemoglobin <7.0  · CBC in AM

## 2023-06-19 NOTE — SPEECH THERAPY NOTE
Speech Language/Pathology  Pt is NPO for placement of HD catheter. HD scheduled for tomorrow, will follow up when able.

## 2023-06-19 NOTE — PLAN OF CARE
Problem: PHYSICAL THERAPY ADULT  Goal: Performs mobility at highest level of function for planned discharge setting. See evaluation for individualized goals. Description: Treatment/Interventions: OT, Spoke to nursing, Gait training, Bed mobility, Patient/family training, Endurance training, LE strengthening/ROM, Functional transfer training          See flowsheet documentation for full assessment, interventions and recommendations. 6/19/2023 1626 by Lam Kaur PT  Note: Prognosis: Good  Problem List: Decreased strength, Decreased range of motion, Decreased endurance, Impaired balance, Decreased mobility, Decreased coordination, Decreased cognition, Decreased safety awareness, Impaired judgement, Pain  Assessment: Pt is 81 y.o. male seen for PT evaluation s/p admit to [address] [place] [address] on 6/14/2023 w/ REMA (acute kidney injury) ([address]), shock,m acidosis, toxic metabolic encephalopathy. PT consulted to assess pt's functional mobility and d/c needs. Order placed for PT eval and tx, w/ up w/ A order. Comorbidities affecting pt's physical performance at time of assessment include:  has a past medical history of Anemia, Arthritis, Atrial fibrillation (HCC), Basal cell carcinoma, CHF (congestive heart failure) ([address]), Diabetes mellitus ([address]), DVT (deep vein thrombosis) in pregnancy, DVT (deep venous thrombosis) ([address]), Dyslipidemia, Encephalopathy acute, GERD (gastroesophageal reflux disease), Hyperlipidemia, Hypertension, Hypomagnesemia, Irregular heart beat, Morbid obesity due to excess calories ([address]), Pulmonary embolism ([address]), Sepsis ([address]), Squamous cell skin cancer, and Visual impairment. PTA, pt was ambulates unrestricted distances and all terrain.  Personal factors affecting pt at time of IE include: inaccessible home environment, ambulating w/ assistive device, stairs to enter home, inability to ambulate household distances, unable to perform physical activity, limited insight into impairments, inability to perform IADLs and inability to perform ADLs. Please find objective findings from PT assessment regarding body systems outlined above with impairments and limitations including weakness, impaired balance, decreased endurance, impaired coordination, gait deviations, pain, decreased activity tolerance, decreased functional mobility tolerance, decreased safety awareness, impaired judgement, fall risk and orthopedic restrictions. Pt required increased A for LE management and to upright trunk during bed mobility. Tolerated sitting EOB without increased A although reports mild increase in dizziness, BP stable. Required increased A to complete transfers with deficits in strength. Ambulated with slow moderately unsteady gait. The following objective measures performed on IE also reveal limitations: The patient's [facility] Basic Mobility Inpatient Short Form Raw Score is 11, Standardized Score is 30.25. A standardized score less than 42.9 suggests the patient may benefit from discharge to post-acute rehabilitation services. Please also refer to the recommendation of the Physical Therapist for safe discharge planning. PT Discharge Recommendation: Post acute rehabilitation services    See flowsheet documentation for full assessment.

## 2023-06-19 NOTE — BRIEF OP NOTE (RAD/CATH)
INTERVENTIONAL RADIOLOGY PROCEDURE NOTE    Date: 6/19/2023    Procedure: Tunneled dialysis catheter placement  Procedure Summary     Date:  Room / Location:     Anesthesia Start:  Anesthesia Stop:     Procedure:  Diagnosis:     Scheduled Providers:  Responsible Provider:     Anesthesia Type: Not recorded ASA Status: Not recorded          Preoperative diagnosis:   1. Generalized weakness    2. Abdominal pain    3. Hyperkalemia    4. REMA (acute kidney injury) ([address])    5. Hypoglycemia    6. UTI (urinary tract infection)    7. Metabolic acidosis    8. Bradycardia    9. AICD (automatic cardioverter/defibrillator) present    10. Septic shock ([address])    11. Acute renal failure superimposed on stage 3 chronic kidney disease, unspecified acute renal failure type, unspecified whether stage 3a or 3b CKD ([address])    12. Melena         Postoperative diagnosis: Same. Surgeon: Gustavo Mcbride MD     Assistant: None. No qualified resident was available. Blood loss: 5 mL    Specimens: None     Findings: Successful right IJV tunneled dialysis catheter placement. Catheter is ready for use. Complications: None immediate.     Anesthesia: conscious sedation and local

## 2023-06-19 NOTE — ASSESSMENT & PLAN NOTE
Assessment:  · Likely secondary to long-term sotalol use   · S/p Dual chamber ICD     Plan:   · Continue to monitor EKG and consider holding if QTc exceeds 450ms  · Optimize electrolytes

## 2023-06-19 NOTE — PROGRESS NOTES
[address] [place] [address]  Progress Note: Critical Care  Name: Jeyson Ortez 81 y.o. male I MRN: [id number]  Unit/Bed#: MICU 15 I Date of Admission: 6/14/2023   Date of Service: 6/19/2023 I Hospital Day: 5    Assessment/Plan   1. Shock - suspected mixed septic shock, and hypovolemic  2. SIRS Sepsis from suspected Urinary tract infection  3. REMA/CKD with severe hyperkalemia   4. Severe acidosis elevated lactate, hypoglycemia - suspected metformin toxicity   5. Upper GI Bleed  6. Hyponatremia - improving  7. Toxic/metabolic encephalopathy resolved  8. Bioprosthetic AVR  9. Anemia   10. DM  11. Diastolic CHF  12. Atrial fibrillation   13. Chronic LE wound and lymphedema    Neuro:   · Diagnosis: Toxic metabolic encephalopathy, resolved, likely due to bradycardia, hypoglycemia, in the setting of sepsis, REMA and Electrolyte Imbalance evidenced by Dysarthria, treated with Hemodialysis and Frequent Neuro Checks  ? Plan: Q4H neuro check  • Diagnosis: analgesia  ? Plan: PRN tylenol, lidocaine cream/patch     CV:   • Diagnosis: septic shock, UTI  ? Plan: Wean norepinephrine gtt to maintain MAP > 65; continue stress dose steroids, if able to wean pressors we will start UF 25 to 50 cc an hour  • Diagnosis: history of diastolic CHF EF 45%   ? Last echo in 2022; home medications include torsemide 40 twice daily   ? Echo this admission EF of 70% with signs of volume overload  ? Hold home torsemide while on CVVH  • Diagnosis: history of PAF  ? 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 Patient interrogated with no findings  • Diagnosis: History of HLD on atorvastatin 40  ? Plan: hold home statin  • Diagnosis: hypothermia in setting of septic shock  ?  Plan: caitlin bal     Pulm:  • Diagnosis: R pleural effusion, chronic; transiently required bipap, to NC, now RA for SpO2<88%  ? Plan: supplemental O2 PRN to maintain SpO2 > 88 %     GI:   • Diagnosis: nausea, resolved  ? Plan: ondansetron PRN  • Diagnosis: history of GERD  ? 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 Suspected ATN secondary to contrast about 7 days ago, along with suspected septic shock, along with metformin toxicity  ? Plan: strict I/Os  ? CVVH stopped today at roughly 5AM  ? Likely transitioning to HD  ? IR consult in for permacath  ? metformin level Pending  ? Q6H BMP, Mg, Phos while on CVVH  • Diagnosis: AGMA, resolved  ? Anion gap is closed with improving bicarb  ? Plan: bicarbonate gtt and will begin to wean once bicarb is WNL  ? Trend LA and BMP and will plan to wean bicarb  • Diagnosis: hyperkalemia, resolved  ? Plan: CVVH  • Diagnosis: BPH  ? Plan: alfred removed as patient not producing significant urine. Will bladder scan q6h to monitor for retention.         F/E/N:   • Plan:   • BMP qd, check Mg and phos  • Replete electrolytes as necessery        Heme/Onc:   • Diagnosis: coagulopathy, supratherapeutic INR on presentation; resolved; S/p VitK, Kcentra              Plan: discontinued heparin subq in the setting of thrombocytopenia  • Diagnosis: history of anemia due to chronic disease with suspected acute blood loss anemia  ? Plan: daily CBC  ? Transfuse for hgb < 7  • Diagnosis: IgM monoclonal gammopathy  • Noted on serum immunofixation performed 10/2022  ?  Plan:  ? Will need outpatient heme onc • Diagnosis: thrombocytopenia  ? Plan: daily CBC  ? Transfuse for plt <10 with signs of bleeding, hold pharmacological dvt prophylaxis when plt<50  ? S/p 1u platelets in anticipation of IR     Endo:   • Diagnosis: hypoglycemia in setting of septic shock  ? Plan: POC glucose QID  • Diagnosis: DM2  ? Plan: hold home metformin  ? SSI        ID:   • Diagnosis: UTI  ? Urine culture showed 50,000-59,000 E. coli, 10 19,000 Klebsiella, and less than 100,000 alphahemolytic strep non-Enterococcus  ? Blood cultures x2 from 6/14 negative  ? blood cultures x2 from 6/16 negative; vanc stopped 6/16  ? Plan: Currently on ceftriaxone, continue        MSK/Skin:   • Diagnosis: back pain, chronic  ? Plan: analgesia as above  • Diagnosis: non-healing RLE wound  ? Plan: local wound care      ICU Core Measures     A: Assess, Prevent, and Manage Pain · Has pain been assessed? Yes  · Need for changes to pain regimen? No   B: Both SAT/SAT  · N/A   C: Choice of Sedation · RASS Goal: 0 Alert and Calm  · Need for changes to sedation or analgesia regimen? No   D: Delirium · CAM-ICU: Negative   E: Early Mobility  · Plan for early mobility? Yes   F: Family Engagement · Plan for family engagement today? Yes       Antibiotic Review: Patient on appropriate coverage based on culture data. Review of Invasive Devices: Combs Plan: Continue for accurate I/O monitoring for 48 hours  Central access plan: HD cath in place.   Plan continue  [place] Plan: Keep arterial line for hemodynamic monitoring and frequent ABGs    Prophylaxis:  VTE VTE covered by:    Lloyd Chan       Stress Ulcer  covered bypantoprazole (PROTONIX) injection 40 mg [[id number]]          Subjective    The patient is an 81-year-old male with past medical history of diastolic CHF, CKD 3, type 2 diabetes, paroxysmal atrial fibrillation, H/o endocarditis and VT with AVR bioprosthetic and bivent ICD, hyperlipidemia, BPH who presented to the ED for 2-day history of generalized weakness, loss balance, fatigue, and loss of appetite. Found to have severe REMA, electrolyte abnormalities lactic acidosis, and therapeutic INR on warfarin. Status post hemodialysis to now CRRT, significant improvement of electrolyte abnormalities, persistent lactic acidosis, and reversal of warfarin started on anticoagulation complicated by upper GI bleed. Found to have UTI on antibiotics multifactorial shock with decreasing requirement of pressors and on steroids. Patient is anxious today regarding renal function and nephrology talk/HD. Endorses weakness and dark stools. Review of Systems   Constitutional: Positive for fatigue. Negative for chills, diaphoresis and fever. Respiratory: Positive for shortness of breath (when off oxygen). Negative for cough and wheezing. Cardiovascular: Positive for leg swelling. Negative for chest pain. Gastrointestinal: Negative for abdominal distention, abdominal pain, constipation, diarrhea and nausea. Genitourinary: Negative for difficulty urinating. Neurological: Negative for dizziness. Objective                            Vitals I/O      Most Recent Min/Max in 24hrs   Temp (!) 97.3 °F (36.3 °C) Temp  Min: 97.2 °F (36.2 °C)  Max: 97.6 °F (36.4 °C)   Pulse 70 Pulse  Min: 64  Max: 78   Resp (!) 8 Resp  Min: 7  Max: 20   /66 No data recorded   O2 Sat 100 % SpO2  Min: 96 %  Max: 100 %      Intake/Output Summary (Last 24 hours) at 6/19/2023 1789  Last data filed at 6/19/2023 0500  Gross per 24 hour   Intake 1884 ml   Output 2878 ml   Net -994 ml         Diet NPO; Sips with meds     Invasive Monitoring Physical exam   Arterial Line  [place] BP 88/50  Arterial Line BP  Min: 86/48  Max: 152/52   MAP 66 mmHg  Arterial Line MAP (mmHg)  Min: 62 mmHg  Max: 84 mmHg    Physical Exam  Constitutional:       General: He is not in acute distress. Appearance: Normal appearance. He is not ill-appearing or toxic-appearing. HENT:      Head: Normocephalic and atraumatic. Right Ear: External ear normal.      Left Ear: External ear normal.   Eyes:      Extraocular Movements: Extraocular movements intact. Pupils: Pupils are equal, round, and reactive to light. Cardiovascular:      Rate and Rhythm: Normal rate and regular rhythm. Heart sounds: Murmur heard. No gallop. Pulmonary:      Effort: No respiratory distress. Breath sounds: No wheezing or rales. Abdominal:      General: Abdomen is flat. There is no distension. Tenderness: There is no abdominal tenderness. There is no guarding. Musculoskeletal:         General: No tenderness. Right lower leg: Edema present. Left lower leg: Edema present. Skin:     General: Skin is warm. Findings: Bruising present. Neurological:      Mental Status: He is alert and oriented to person, place, and time. Motor: Weakness (generalized) present. Psychiatric:         Mood and Affect: Mood normal.         Behavior: Behavior normal.            Diagnostic Studies         Medications:  Scheduled PRN   cefTRIAXone, 2,000 mg, Q24H  chlorhexidine, 15 mL, Q12H AMANDA  cholecalciferol, 1,000 Units, Daily  heparin (porcine), 7,500 Units, Q8H [address]  insulin lispro, 2-12 Units, 4x Daily (with meals and at bedtime)  lidocaine, 1 patch, Daily  pantoprazole, 40 mg, Q12H AMANDA      acetaminophen, 650 mg, Q6H PRN  diphenhydramine, lidocaine, Al/Mg hydroxide, simethicone, 10 mL, Q6H PRN  HYDROmorphone, 0.2 mg, Q3H PRN  lidocaine, , 4x Daily PRN  trimethobenzamide, 200 mg, Q6H PRN       Continuous    norepinephrine, 1-30 mcg/min, Last Rate: Stopped (06/17/23 0306)  NxStage K 4/Ca 3, 20,000 mL  vasopressin, 0.04 Units/min, Last Rate: Stopped (06/17/23 1301)         Labs:    CBC    Recent Labs     06/17/23  1217 06/18/23  0011 06/18/23  0617 06/18/23  1806   WBC 11.98*  --  16.47*  --    HGB 7.9*   < > 7.6* 7.6*   HCT 24.9*   < > 24.3* 24.1*   PLT 78*  --  61*  --     < > = values in this interval not displayed. BMP    Recent Labs     06/18/23  1806 06/19/23  0001   SODIUM 138 139   K 3.8 4.2    109*   CO2 28 28   AGAP 3* 2*   BUN 13 12   CREATININE 1.31* 1.18   CALCIUM 8.8 8.8       Coags    Recent Labs     06/18/23  0617 06/19/23  0550   INR 1.61* 1.86*        Additional Electrolytes  Recent Labs     06/18/23  1806 06/19/23  0001 06/19/23  0550   MG 1.8 1.9  --    PHOS 1.8* 1.7*  --    CAIONIZED 1.18 1.16 1.18          Blood Gas    No recent results  No recent results LFTs  No recent results    Infectious  No recent results  Glucose  Recent Labs     06/18/23  0617 06/18/23  1240 06/18/23 1806 06/19/23  0001   GLUC 149* 149* 1100 Angela. Alia Bautista MD

## 2023-06-19 NOTE — PROGRESS NOTES
Progress Note- Shelli Covert 81 y.o. male MRN: [id number]    Unit/Bed#: [facility] 13 Encounter: [id number]      Assessment and Plan:    43-year-old male with history including but not limited to CKD, CHF, AVR on Coumadin who presents with REMA, SIRS recently started on CRRT. During the course of hospitalization noted to have melena while being on anticoagulation. 1.  Melena  Intermittent small-volume bouts of melena. Last bowel movement was day before yesterday. Hemoglobin 7.3 which is stable as compared to yesterday. INR 1.8. Not on any pressors at this time. -- Given patient's hemodynamic stability, relatively stable hemoglobin we will continue to monitor hemoglobin levels, continue PPI twice daily  -- Patient likely has some mucosal oozing from GI tract in setting of acute thrombocytopenia, will recommend correction and evaluation of thrombocytopenia  -- Okay for diet from GI standpoint    This was discussed with ICU team.    ______________________________________________________________________    Subjective:     Patient seen and examined at bedside. Accompanied by significant other at bedside. Reports feeling well overall.     Medication Administration - last 24 hours from 06/18/2023 0933 to 06/19/2023 0933       Date/Time Order Dose Route Action Action by     06/19/2023 0906 EDT chlorhexidine (PERIDEX) 0.12 % oral rinse 15 mL 15 mL Mouth/Throat Given Grace Case RN     06/18/2023 2017 EDT chlorhexidine (PERIDEX) 0.12 % oral rinse 15 mL 15 mL Mouth/Throat Given Halie Mejia RN     06/19/2023 1699 EDT cholecalciferol (VITAMIN D) oral liquid 1,000 Units 1,000 Units Oral Not Given Grace Case RN     06/19/2023 2762 EDT lidocaine (LIDODERM) 5 % patch 1 patch 1 patch Topical Medication Applied Grace Case RN     06/18/2023 2153 EDT lidocaine (LIDODERM) 5 % patch 1 patch 1 patch Topical Patch Removed Halie Mejia RN     06/18/2023 1006 EDT lidocaine (LIDODERM) 5 % patch 1 patch 1 patch Topical Medication Applied Romain Angel, JEET     06/19/2023 5880 EDT pantoprazole (PROTONIX) injection 40 mg 40 mg Intravenous Given Matilda Ruvalcaba RN     06/18/2023 2017 EDT pantoprazole (PROTONIX) injection 40 mg 40 mg Intravenous Given Airam Ackerman, JEET     06/18/2023 1951 EDT cefTRIAXone (ROCEPHIN) 1,000 mg in dextrose 5 % 50 mL IVPB 0 mg Intravenous Stopped Airam Ackerman, JEET     06/18/2023 1640 EDT trimethobenzamide (TIGAN) IM injection 200 mg 200 mg Intramuscular Given Romain Angel, JEET     06/19/2023 0530 EDT NxStage K 4/Ca 3 dialysis solution (RFP-401) 20,000 mL 0 mL Dialysis Stopped Airam Ackerman RN     06/19/2023 0012 EDT NxStage K 4/Ca 3 dialysis solution (RFP-401) 20,000 mL 20,000 mL Dialysis New [address] [facility], RN     06/18/2023 1744 EDT NxStage K 4/Ca 3 dialysis solution (RFP-401) 20,000 mL 20,000 mL Dialysis [address] [place], [place] [address]dow, RN     06/18/2023 9940 EDT NxStage K 4/Ca 3 dialysis solution (RFP-401) 20,000 mL 20,000 mL Dialysis New [address] [place] [address], RN     06/19/2023 0150 EDT HYDROmorphone HCl (DILAUDID) injection 0.2 mg 0.2 mg Intravenous Given Airam Ackerman, JEET     06/18/2023 2158 EDT HYDROmorphone HCl (DILAUDID) injection 0.2 mg 0.2 mg Intravenous Given Airam Ackerman RN     06/18/2023 1737 EDT HYDROmorphone HCl (DILAUDID) injection 0.2 mg 0.2 mg Intravenous Not Given Romain Angel, JEET     06/18/2023 1134 EDT HYDROmorphone HCl (DILAUDID) injection 0.2 mg 0.2 mg Intravenous Given Romain Angel, JEET     06/18/2023 1952 EDT sodium phosphate 6 mmol in sodium chloride 0.9 % 100 mL Infusion 0 mmol Intravenous Stopped Airam Ackerman RN     06/18/2023 1951 EDT calcium gluconate 1 g in sodium chloride 0.9% 50 mL (premix) 0 g Intravenous Stopped Airam Ackerman RN     06/18/2023 1130 EDT sodium phosphate 6 mmol in sodium chloride 0.9 % 100 mL Infusion 0 mmol Intravenous Stopped Romain Angel RN     06/18/2023 1300 EDT cefTRIAXone (ROCEPHIN) 2,000 mg in dextrose 5 % 50 mL IVPB 0 mg Intravenous Stopped Marcio Mcdonald, RN     06/18/2023 1222 EDT cefTRIAXone (ROCEPHIN) 2,000 mg in dextrose 5 % 50 mL IVPB 2,000 mg Intravenous [address]  Marcio Mcdonald, RN     06/19/2023 0744 EDT insulin lispro (HumaLOG) 100 units/mL subcutaneous injection 2-12 Units -- Subcutaneous Not Given Emerson Betts, RN     06/18/2023 2155 EDT insulin lispro (HumaLOG) 100 units/mL subcutaneous injection 2-12 Units 2 Units Subcutaneous Given Latanya Menchaca, RN     06/18/2023 1756 EDT insulin lispro (HumaLOG) 100 units/mL subcutaneous injection 2-12 Units 2 Units Subcutaneous Given Marcio Mcdonald, RN     06/18/2023 1222 EDT insulin lispro (HumaLOG) 100 units/mL subcutaneous injection 2-12 Units 4 Units Subcutaneous Given Marcio Mcdonald, RN     06/19/2023 2149 EDT heparin (porcine) subcutaneous injection 7,500 Units 7,500 Units Subcutaneous Given Lutcher Nikolai, RN     06/18/2023 2155 EDT heparin (porcine) subcutaneous injection 7,500 Units 7,500 Units Subcutaneous Given Lutcher Nikolai, RN     06/18/2023 1756 EDT heparin (porcine) subcutaneous injection 7,500 Units 7,500 Units Subcutaneous Given Marcio Mcdonald, RN     06/18/2023 1006 EDT heparin (porcine) subcutaneous injection 7,500 Units 7,500 Units Subcutaneous Given Marcio Mcdonald, RN     06/18/2023 1701 EDT calcium gluconate 1 g in sodium chloride 0.9% 50 mL (premix) 0 g Intravenous Stopped Marcio Mcdonald, RN     06/18/2023 1612 EDT calcium gluconate 1 g in sodium chloride 0.9% 50 mL (premix) 1 g Intravenous [address]  Marcio Mcdonald, RN     06/18/2023 1952 EDT sodium phosphate 9 mmol in sodium chloride 0.9 % 100 mL Infusion 0 mmol Intravenous Stopped Latanya Menchaca, RN     06/18/2023 1611 EDT sodium phosphate 9 mmol in sodium chloride 0.9 % 100 mL Infusion 9 mmol Intravenous [address] [facility], RN     06/18/2023 1640 EDT lidocaine (LMX) 4 % cream -- Topical Given Tiesha PERALTA Wei, RN     06/18/2023 2100 EDT magnesium sulfate IVPB (premix) SOLN 1 g 0 g Intravenous Stopped Cesar Rondon, RN     06/18/2023 2000 EDT magnesium sulfate IVPB (premix) SOLN 1 g 1 g Intravenous [address] [facility]     06/18/2023 2300 EDT potassium chloride 20 mEq IVPB (premix) 0 mEq Intravenous Stopped Cesar Rondon, RN     06/18/2023 2033 EDT potassium chloride 20 mEq IVPB (premix) 20 mEq Intravenous New [address] [facility], RN     06/18/2023 1934 EDT potassium chloride 20 mEq IVPB (premix) 20 mEq Intravenous [address]  Cesar Rondon, RN     06/18/2023 2300 EDT sodium phosphate 9 mmol in sodium chloride 0.9 % 100 mL Infusion 0 mmol Intravenous Stopped Cesar Rondon, RN     06/18/2023 2000 EDT sodium phosphate 9 mmol in sodium chloride 0.9 % 100 mL Infusion 9 mmol Intravenous [address] [facility]     06/18/2023 2100 EDT calcium gluconate 1 g in sodium chloride 0.9% 50 mL (premix) 0 g Intravenous Stopped Cesar Rondon, RN     06/18/2023 1934 EDT calcium gluconate 1 g in sodium chloride 0.9% 50 mL (premix) 1 g Intravenous [address] [facility] Cesar Rondon, RN     06/19/2023 0500 EDT sodium phosphate 9 mmol in sodium chloride 0.9 % 100 mL Infusion 0 mmol Intravenous Stopped Cesar Rondon, RN     06/19/2023 0155 EDT sodium phosphate 9 mmol in sodium chloride 0.9 % 100 mL Infusion 9 mmol Intravenous [address] [facility], RN     06/19/2023 0303 EDT magnesium sulfate IVPB (premix) SOLN 1 g 0 g Intravenous Stopped Cesar Rondon, RN     06/19/2023 0047 EDT magnesium sulfate IVPB (premix) SOLN 1 g 1 g Intravenous [address] [place] [address] Alcon, RN     06/19/2023 0200 EDT calcium gluconate 1 g in sodium chloride 0.9% 50 mL (premix) 0 g Intravenous Stopped Cesar RondonJEET     06/19/2023 0047 EDT calcium gluconate 1 g in sodium chloride 0.9% 50 mL (premix) 1 g Intravenous 600 Catracho Reveles RN     06/19/2023 1570 EDT sodium phosphate 21 mmol in dextrose 5 % 250 mL Infusion 21 mmol Intravenous [address] [place], [place]     06/19/2023 0840 EDT polyethylene glycol (MIRALAX) packet 17 g 17 g Oral Not Given Andria Todd RN          Objective:     Vitals: Blood pressure 123/66, pulse 76, temperature 97.5 °F (36.4 °C), temperature source Oral, resp. rate 15, height 6' 1" (1.854 m), weight (!) 140 kg (307 lb 8.7 oz), SpO2 94 %. ,Body mass index is 40.58 kg/m². Intake/Output Summary (Last 24 hours) at 6/19/2023 0933  Last data filed at 6/19/2023 0500  Gross per 24 hour   Intake 1545 ml   Output 2553 ml   Net -1008 ml       Physical Exam:   General Appearance: Awake and alert, in no acute distress  Abdomen: Soft, non-tender, non-distended; bowel sounds normal; no masses or no organomegaly    Invasive Devices     Peripheral Intravenous Line  Duration           Peripheral IV 06/14/23 Left Antecubital 4 days    Peripheral IV 06/18/23 Right Antecubital <1 day          Arterial Line  Duration           Arterial Line 06/14/23 Radial 4 days          Hemodialysis Catheter  Duration           HD Temporary Double Catheter 5 days                Lab Results:  No results displayed because visit has over 200 results. Imaging Studies: I have personally reviewed pertinent imaging studies.

## 2023-06-19 NOTE — ASSESSMENT & PLAN NOTE
Assessment:  · Pre-existing HFpEF approximately 60% with long-standing right heart dysfunction with elevated right heart pressures, now likely worsened from possible acute pneumonia causing increased resistance, volume overload, and vasoconstriction associated with episodes of hypoxia  · Current weight: 135kg   · Relevant echocardiograms:  · 6/12/23 TTE: LV cavity size normal. Wall thickness normal. Moderate concentric hypertrophy. Moderate asymmetric hypertrophy of septal wall. LVEF 70%. Systolic function is normal. Wall motion is normal. Diastolic function is normal. Abnormal septal motion. Diastolic flattening of IV septum consistent with RV volume overload. RV cavity size moderately dilated. Systolic function normal. Mild MR, mild MS. Moderate TR. RVSP severely elevated 73.00 mmHg.     Plan:   · Continue diuresis as tolerated  · Bumex 1 mg/hr  · Daily weights  · Currently NPO, but should maintain Na and fluid restriction

## 2023-06-19 NOTE — PROGRESS NOTES
[address] [facility]  ICU Transfer Note - Critical Care Medicine    Alli Hernandez 1935, 81 y.o. male. MRN: [id number]  Unit/Bed#: [facility]U 13 Encounter: [id number]  Primary Care Provider: Roberto Burger DO      Admission Date: 6/14/2023 1607  Length of Stay: 5 days  Code Status: Level 1 - Full Code  Diet: Diet NPO; Sips with meds    Assessment/Plan:    * REMA (acute kidney injury) ([address])  Assessment & Plan  Likely UTI was the initial cause of REMA, exacerbated by concurrent metformin and sotolol use. Plan:  · Currently aneuric  · Combs has been removed  · monitoring bladder with q6h bladder scans  · CVVH discontinued 6/19  · IR consult for permacath  · Nephrology following, IHD likely this admission however patient is euvolemic and electrolytes WNL  · Continue to trend Cr and electrolytes    Septic shock ([address])  Assessment & Plan  2/2 MDR E.coli urosepsis. Briefly required ICU stay and pressor support. Currently off pressors. Abx day 6, s/p 4 days of cefepime, currently day 2 of ceftriaxone. Plan:  · Continue ceftriaxone for a total abx duration of 7 days. Thrombocytopenia ([address])  Assessment & Plan  Admission platelets of 746. Steady downtrend this admission, most recent platelet count of 35. One instance of melena documented on 6/16 but at the time patient had platelete count in the 120k-130k. Unclear eitiology but likely 2/2 acute sepsis vs CVVH and filter clotting. Patient did receive heparin intermittently this admission however 4T score of 3 and platelets have not responded to discontinuation of heparin products. Possibly cephalosporin induced, but patient is s/p 4 days of cefepime and then transitioned to ceftriaxone.      Plan:  · Likely 2/2 sepsis  · No acute signs of bleeding on exam  · S/p 1 U platelets 5/62 in preparation for IR permacath placement  · Trend CBC  · Holding pharmacologic AC/DVT prophylaxis      CKD (chronic kidney disease) stage 3, GFR 30-59 ml/min [facility])  Assessment & Plan  Lab Results   Component Value Date    EGFR 63 06/19/2023    EGFR 55 06/19/2023    EGFR 48 06/18/2023    CREATININE 1.05 06/19/2023    CREATININE 1.18 06/19/2023    CREATININE 1.31 (H) 06/18/2023     Baseline Cr 1.3. Patient has been oliguric/anuric this admission however in the setting of REMA. Has been maintained on CVVH while in ICU. Plan:  · Nephrology following, appreciate recs. · Improved Cr this admission in the setting of CVVH   · CVVH discontinued 6/19 @5AM  · Permacath per IR for HD  · HD potentially sometime this week  · Holding home torsemide    Prolonged QT interval  Assessment & Plan  Noted on EKG. Avoid QTC prolonging medications. Acidosis  Assessment & Plan  Metabolic acidosis in the setting of REMA and lactic acidosis, potentially 2/2 to metformin intoxication vs acute infection. Plan:  · Holding metformin  · Metformin level sent out, pending. · Antibiotics as above    CHF (congestive heart failure) (HCC)  Assessment & Plan  Wt Readings from Last 3 Encounters:   06/19/23 (!) 140 kg (307 lb 8.7 oz)   06/07/23 136 kg (299 lb 13.2 oz)   06/02/23 130 kg (285 lb 8 oz)       Patient with a hx of HFpEF70%. Plan:  · Patient euvolemic after CVVH  · Currently holding home torsemide in the setting of anuria. · Pending permacath and HD, nephrology following. History of ventricular tachycardia  Assessment & Plan  S/p DUAL CHAMBER ICD/ NOT MRI CONDITIONAL . 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 Plan:  · Warfarin held in the setting of thrombocytopenia  · Patient currently rate controlled, continuing to hold sotalol in the setting of anuria. Acute on chronic anemia  Assessment & Plan  Patient with chronic macrocytic anemia baseline 7-8. Did have a hemoglobin of 6.9 and received 1uPRCs for this.  Of note, there was also a concern for melena on 6/16 but with stable hemoglobin and decreasing pressor requirements as well as intermittent CVVH filter loss due to clotting with associated small blood loss. Plan:  · Daily CBCs  · Goal Hgb >7, replete as necessary  · GI following of melena, no acute intervention  · Continue PPI  · Watch for s/s of GI bleed    Diabetes [facility])  Assessment & Plan  Lab Results   Component Value Date    HGBA1C 5.0 06/15/2023       Recent Labs     06/18/23  1603 06/18/23  2044 06/19/23  0742 06/19/23  1143   POCGLU 163* 159* 116 124       Blood Sugar Average: Last 72 hrs:  (P) [id number]     Plan:  · Home regimen of metformin held in the setting of REMA and metabolic acidosis. · Continue with SSI QID           Code Status: Level 1 - Full Code  POA:    POLST:      Reason for ICU admission:   Septic Shock    Active problems:   Principal Problem:    REMA (acute kidney injury) ([address])  Active Problems: Thrombocytopenia (HCC)    Septic shock (HCC)    CKD (chronic kidney disease) stage 3, GFR 30-59 ml/min (HCC)    Diabetes ([address])    Acute on chronic anemia    Atrial fibrillation (HCC)    CHF (congestive heart failure) ([address])    Acidosis    Prolonged QT interval  Resolved Problems:    Hypomagnesemia    Hyperkalemia      Consultants:   Nephrology  Electrophysiology  Gastroenterology    History of Present Illness:   6/14 H and P by Dr. Rio Corrales:    "Manju Shearer is a 81 y.o. male who presents to ED reporting 2 days of generalized weakness, fatigue, loss of appetite, and feeling generally unwell. Denies chest pain, dyspnea, isolated neuro deficits, or recent change in medications.      Workup in ED significant for UTI, AGMA, hypoglycemia, lactic acidosis, and potassium > 7. Assessed by nephrology in ED. On arrival to ICU patient was alert and oriented but becoming increasingly dysarthric. Patient became bradycardic with widening QRS complexes on telemetry.  Patient was treated w/ temporizing measures for hyperkalemia and put on HD.   "    Summary of clinical course:   Patient was also hypotensive on presentation was admitted to the ICU for pressor support. Required 3 days of norepinephrine and vasopressin after which pressors were weaned. Metabolic acidosis was treated with CVVH and sodium bicarb. He did have one instance of melena with little change to hemoglobin (6.9 from 7.3) and improving pressor requirements. GI was consulted and have no plans for EGD as patient continues to be hemodynamically stable. Patient's AICD was interrogated this admission and showed no arrhythmias besides 3 beat NSVT earlier in the month. Patient continued on CVVH until 6/19. He continues to be anuric and so will need a permacath for HD. Nephrology and IR are both consulted for this. Course has been complicated by thrombocytopenia likely 2/2 to acute infection and intermittent clotting on CVVH. He will receive 1u platelets on 7/04 in anticipation of IR permacath placement. PT/OT are consulted as well as case management. 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. He has been told that he will likely not be home for this award unfortunately due to his ongoing medical issues. Recent or scheduled procedures:   Permacath placement via IR. Outstanding/pending diagnostics:   Metformin level    Cultures:   50,000-59,000 cfu/ml Escherichia coli Abnormal     Multi-Drug Resistant Organism   Please note: This patient requires contact precautions. This organism has been edited.  The previous result was Gram Negative Miquel Enteric Like on 6/15/2023 at 2140 EDT.   10,000-19,000 cfu/ml Klebsiella oxytoca Abnormal        <10,000 cfu/ml Alpha Hemolytic Streptococcus NOT Enterococcus Abnormal              Mobilization Plan:   Out of bed daily if tolerates    Nutrition Plan:   Currently NPO pending IR permacath placement    Invasive Devices Review  Invasive Devices     Peripheral Intravenous Line  Duration Peripheral IV 06/14/23 Left Antecubital 4 days    Peripheral IV 06/18/23 Right Antecubital 1 day          Arterial Line  Duration           Arterial Line 06/14/23 Radial 4 days          Hemodialysis Catheter  Duration           HD Temporary Double Catheter 5 days                Rationale for remaining devices: n/a    VTE Pharmacologic Prophylaxis: Held in the setting of thrombocytopenia  VTE Mechanical Prophylaxis: sequential compression device    Discharge Plan:   Patient should be ready for discharge when medically stable, pending nephrology recommendations. 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 Also currently aneuric. Warfarin: held on admission due to INR>15 on presentation, continue to hold in the setting of thrombocytopenia    Spoke with Dr. Zaina Monique  regarding transfer. Please contact critical care via [employer] with any questions or concerns.

## 2023-06-19 NOTE — ASSESSMENT & PLAN NOTE
Assessment:   · Patient primarily V-paced or NSR this admission with conversion back to atrial fibrillation on 6/28/23  · Rates becoming more elevated, likely in response to hypoxia, increased WOB    Plan:  · Goal HR < 120  · Home Sotalol 80mg daily on hold while NPO  · Closely monitor if resumed given REMA  · Start metoprolol 5mg IV q6h today as tolerated by BP  · Systemic anticoagulation currently on hold  · On home warfarin   · Optimize electrolytes: K > 4.0, Mag > 2.0

## 2023-06-19 NOTE — PLAN OF CARE
Problem: OCCUPATIONAL THERAPY ADULT  Goal: Performs self-care activities at highest level of function for planned discharge setting. See evaluation for individualized goals. Description: Treatment Interventions: ADL retraining, Functional transfer training, UE strengthening/ROM, Endurance training, Cognitive reorientation, Patient/family training, Equipment evaluation/education, Compensatory technique education, Continued evaluation, Energy conservation, Activityengagement          See flowsheet documentation for full assessment, interventions and recommendations. Note: Limitation: Decreased ADL status, Decreased UE strength, Decreased endurance, Decreased self-care trans, Decreased high-level ADLs  Prognosis: Fair  Assessment: Pt is a 80 y/o male seen for OT eval s/p adm to B w/ 2 days of generalized weakness, fatigue, loss of appetite and feeling generally unwell. Workup (+) for UTI, AGMA, hypoglycemia, lactic acidosis. Arrival to ICU pt became dysarthric, bradycardic and put on HD. Pt  has a past medical history of Anemia, Arthritis, Atrial fibrillation ([address]), Basal cell carcinoma (03/22/2022), CHF (congestive heart failure) ([address]), Diabetes mellitus ([address]), DVT (deep vein thrombosis) in pregnancy (1966), DVT (deep venous thrombosis) ([address]) (1966), Dyslipidemia, Encephalopathy acute (11/4/2022), GERD (gastroesophageal reflux disease), Hyperlipidemia, Hypertension, Hypomagnesemia (10/19/2022), Irregular heart beat, Morbid obesity due to excess calories ([address]), Pulmonary embolism ([address]), Sepsis ([address]), Squamous cell skin cancer (07/30/2020), and Visual impairment. Pt with active OT orders and up with assistance  orders. Pt lives with his spouse and family in split level w/ 0 RADHA, pt remains on 1st floor. Pt was I w/  ADLS and shares IADLS w/ spouse, drove, & required no use of DME PTA. Pt is currently demonstrating the following occupational deficits:  Mod A UB ADLS, Max A LB ADLS, Mod a x2 bed mobility, Min A EOB sitting, Mod A x2 transfers and functional mobility w/ B/L HHA. These deficits that are impacting pt's baseline areas of occupation are a result of the following impairments: endurance, activity tolerance, functional mobility, forward functional reach, balance, trunk control, functional standing tolerance, decreased I w/ ADLS/IADLS, strength and decreased insight into deficits. The following Occupational Performance Areas to address include: eating, grooming, bathing/shower, toilet hygiene, dressing, health maintenance, functional mobility, community mobility, clothing management and household maintenance. Recommend inpatient rehab  upon D/C.  Pt to continue to benefit from acute immediate OT services to address the following goals 2-3x/week to  w/in 10-14 days:     OT Discharge Recommendation: Post acute rehabilitation services     Michelet Madrigal MS, OTR/L

## 2023-06-19 NOTE — OCCUPATIONAL THERAPY NOTE
Occupational Therapy Evaluation     Patient Name: Monica Fox  Today's Date: 6/19/2023  Problem List  Principal Problem:    REMA (acute kidney injury) ([address])  Active Problems:    Diabetes ([address])    Acute on chronic anemia    Atrial fibrillation (HCC)    History of ventricular tachycardia    CKD (chronic kidney disease) stage 3, GFR 30-59 ml/min (HCC)    CHF (congestive heart failure) (HCC)    Acidosis    Prolonged QT interval    Thrombocytopenia (HCC)    Septic shock ([address])    Past Medical History  Past Medical History:   Diagnosis Date    Anemia     Arthritis     Atrial fibrillation ([address])     Basal cell carcinoma 03/22/2022    Tip of Nose    CHF (congestive heart failure) (HCC)     Diabetes mellitus (HCC)     Niddm    DVT (deep vein thrombosis) in pregnancy 1966    not in pregnancy    DVT (deep venous thrombosis) ([address]) 1966    Dyslipidemia     Encephalopathy acute 11/4/2022    GERD (gastroesophageal reflux disease)     Hyperlipidemia     Hypertension     Hypomagnesemia 10/19/2022    Irregular heart beat     Afib    Morbid obesity due to excess calories ([address])     Resolved 9/2/2014     Pulmonary embolism (HCC)     Sepsis ([address])     Squamous cell skin cancer 07/30/2020    Left posterior scalp    Visual impairment      Past Surgical History  Past Surgical History:   Procedure Laterality Date    AORTIC VALVE REPLACEMENT      CARDIAC DEFIBRILLATOR PLACEMENT  04/2014    CARDIAC SURGERY  02/2014    AVR    COLONOSCOPY      INSERT / REPLACE / REMOVE PACEMAKER      IR ASPIRATION BAKERS CYST  3/8/2023    IR CHOLECYSTOSTOMY TUBE CHECK/CHANGE/REPOSITION/REINSERTION/UPSIZE  10/13/2022    IR CHOLECYSTOSTOMY TUBE CHECK/CHANGE/REPOSITION/REINSERTION/UPSIZE  10/19/2022    IR CHOLECYSTOSTOMY TUBE CHECK/CHANGE/REPOSITION/REINSERTION/UPSIZE  10/27/2022    IR CHOLECYSTOSTOMY TUBE CHECK/CHANGE/REPOSITION/REINSERTION/UPSIZE  11/7/2022    IR CHOLECYSTOSTOMY TUBE CHECK/CHANGE/REPOSITION/REINSERTION/UPSIZE  11/10/2022    IR CHOLECYSTOSTOMY TUBE CHECK/CHANGE/REPOSITION/REINSERTION/UPSIZE  12/1/2022    IR CHOLECYSTOSTOMY TUBE CHECK/CHANGE/REPOSITION/REINSERTION/UPSIZE  1/6/2023    IR CHOLECYSTOSTOMY TUBE CHECK/CHANGE/REPOSITION/REINSERTION/UPSIZE  1/24/2023    IR CHOLECYSTOSTOMY TUBE CHECK/CHANGE/REPOSITION/REINSERTION/UPSIZE  3/15/2023    IR CHOLECYSTOSTOMY TUBE PLACEMENT  9/1/2022    IR DRAINAGE TUBE CHECK AND/OR REMOVAL  3/22/2023    IR DRAINAGE TUBE CHECK AND/OR REMOVAL  4/10/2023    IR DRAINAGE TUBE PLACEMENT  4/6/2023    JOINT REPLACEMENT Left     LTKR    MOHS SURGERY  07/30/2020    Left posterior scalp, Dr. Constance Calero  04/18/2022    [facility] Tip of Nose- Dr. Jacobs Diver DIVJ&STRIP LONG SAPH SAPHFEM Franchester Freud Right 8/17/2018    Procedure: LEG PERFORATED INJECTION SCLEROTHERAPY;  Surgeon: Charmayne Skiff, MD;  Location: [facility]  MAIN [facility];  Service: Vascular    REPLACEMENT TOTAL KNEE Right     TOTAL KNEE ARTHROPLASTY Left     VASCULAR SURGERY      VENA CAVA FILTER PLACEMENT      Interruption inferior vena cava, Conley filter, placement    WISDOM TOOTH EXTRACTION             06/19/23 1413   OT Last Visit   OT Visit Date 06/19/23   Note Type   Note type Evaluation   Pain Assessment   Pain Assessment Tool 0-10   Pain Score No Pain   Restrictions/Precautions   Weight Bearing Precautions Per Order No   Other Precautions Contact/isolation;Cognitive;Telemetry;Multiple lines;O2;Fall Risk;Pain  (1.5 L O2; A-line)   Home Living   Type of [address] [place] [address] Dr One level;Performs ADLs on one level; Able to live on main level with bedroom/bathroom  (0STE)   Bathroom Shower/Tub Walk-in shower   Bathroom Toilet Standard   Bathroom Equipment Grab bars around toilet; Shower chair;Grab bars in shower   Home Equipment Walker;Cane  (unused PTA)   Additional Comments Pt resides in split level home w/ 0 RADHA; remains on 1st floor; dght, NABOR and 20 y/o grandkid lives upstairs   Prior Function   Level of [place] Independent with ADLs; Independent with functional mobility; Needs assistance with IADLS   Lives With Spouse; Family   Receives Help From Family   IADLs Independent with driving; Independent with meal prep; Independent with medication management   Falls in the last 6 months 1 to 4  (1)   Vocational Full time employment   Comments (+)    Lifestyle   Autonomy I w/ ADLS, spouse completes household IADLS, I w/ transfers and functional mobility PTA   Reciprocal Relationships pt lives w/ his spouse; family lives upstairs; has 5 dghts; 25 grandkids, 35 great grandkids and 1 great great grandkid   Service to Others works as a  at [employer] to be w/ family   General   Family/Caregiver Present Yes   ADL   Eating Assistance 5  Supervision/Setup   Grooming Assistance 5  Supervision/Setup    [address] 3  Moderate Assistance    [address] 2  Maximal [place] 3  Moderate Assistance   LB Dressing Assistance 2  Maximal [address]  2  Maximal [facility] 3  Moderate Assistance   Functional Deficit Steadying;Verbal cueing;Supervision/safety; Increased time to complete  (Ax2)   Bed Mobility   Supine to Sit 3  Moderate assistance   Additional items Assist x 2;HOB elevated; Increased time required;Verbal cues;LE management   Sit to Supine Unable to assess   Additional Comments Pt sat EOB w/ Min A x1 for sitting balance/trunk control   Transfers   Sit to Stand 3  Moderate assistance   Additional items Assist x 2; Increased time required;Verbal cues   Stand to Sit 3  Moderate assistance   Additional items Assist x 2; Increased time required;Verbal cues   Additional Comments HHA used B/L   Functional Mobility   Functional Mobility 3  Moderate assistance   Additional Comments Pt took few small steps from EOB to chair w/ Mod A x2; B/L HHA used   Additional items Hand hold assistance   Balance   Static Sitting Poor +   Dynamic Sitting Poor   Static Standing Poor   Dynamic Standing Poor   Ambulatory Poor   Activity Tolerance   Activity Tolerance Patient limited by fatigue   Medical Staff Made Aware PT   Nurse Made Aware yes, Hilda Good Assessment   RUE Assessment X  (generalized weakness)   LUE Assessment   LUE Assessment X  (generalized weakness)   Hand Function   Gross Motor Coordination Functional   Fine Motor Coordination Functional   Cognition   Overall Cognitive Status WFL   Arousal/Participation Responsive; Cooperative;Lethargic   Attention Attends with cues to redirect   Orientation Level Oriented X4   Memory Decreased recall of precautions   Following Commands Follows one step commands without difficulty   Comments Pt is pleasant and cooperative; lethargic overall   Assessment   Limitation Decreased ADL status; Decreased UE strength;Decreased endurance;Decreased self-care trans;Decreased high-level ADLs   Prognosis Fair   Assessment Pt is a 80 y/o male seen for OT eval s/p adm to [facility] w/ 2 days of generalized weakness, fatigue, loss of appetite and feeling generally unwell. Workup (+) for UTI, AGMA, hypoglycemia, lactic acidosis. Arrival to ICU pt became dysarthric, bradycardic and put on HD. Pt  has a past medical history of Anemia, Arthritis, Atrial fibrillation ([address]), Basal cell carcinoma (03/22/2022), CHF (congestive heart failure) ([address]), Diabetes mellitus ([address]), DVT (deep vein thrombosis) in pregnancy (1966), DVT (deep venous thrombosis) ([address]) (1966), Dyslipidemia, Encephalopathy acute (11/4/2022), GERD (gastroesophageal reflux disease), Hyperlipidemia, Hypertension, Hypomagnesemia (10/19/2022), Irregular heart beat, Morbid obesity due to excess calories ([address]), Pulmonary embolism ([address]), Sepsis ([address]), Squamous cell skin cancer (07/30/2020), and Visual impairment. Pt with active OT orders and up with assistance  orders. Pt lives with his spouse and family in split level w/ 0 RADHA, pt remains on 1st floor.  Pt was I w/  ADLS and shares IADLS w/ spouse, drove, & required no use of DME PTA. Pt is currently demonstrating the following occupational deficits: Mod A UB ADLS, Max A LB ADLS, Mod a x2 bed mobility, Min A EOB sitting, Mod A x2 transfers and functional mobility w/ B/L HHA. These deficits that are impacting pt's baseline areas of occupation are a result of the following impairments: endurance, activity tolerance, functional mobility, forward functional reach, balance, trunk control, functional standing tolerance, decreased I w/ ADLS/IADLS, strength and decreased insight into deficits. The following Occupational Performance Areas to address include: eating, grooming, bathing/shower, toilet hygiene, dressing, health maintenance, functional mobility, community mobility, clothing management and household maintenance. Recommend inpatient rehab  upon D/C. Pt to continue to benefit from acute immediate OT services to address the following goals 2-3x/week to  w/in 10-14 days:   Goals   Patient Goals to be more independent   LTG Time Frame 10-14   Long Term Goal #1 see below listed goals   Plan   Treatment Interventions ADL retraining;Functional transfer training;UE strengthening/ROM; Endurance training;Cognitive reorientation;Patient/family training;Equipment evaluation/education; Compensatory technique education;Continued evaluation; Energy conservation; Activityengagement   Goal Expiration Date 23   OT Frequency 2-3x/wk   Recommendation   OT Discharge Recommendation Post acute rehabilitation services   Additional Comments  The patient's raw score on the AM-PAC Daily Activity Inpatient Short Form is 14. A raw score of less than 19 suggests the patient may benefit from discharge to post-acute rehabilitation services. Please refer to the recommendation of the Occupational Therapist for safe discharge planning.    Additional Comments 2 Pt seen as a co-session due to the patient's co-morbidities, clinically unstable presentation, and present impairments which are a regression from the patient's baseline. AM-PAC Daily Activity Inpatient   Lower Body Dressing 2   Bathing 2   Toileting 2   Upper Body Dressing 2   Grooming 3   Eating 3   Daily Activity Raw Score 14   Daily Activity Standardized Score (Calc for Raw Score >=11) 33.39   AM-PAC Applied Cognition Inpatient   Following a Speech/Presentation 3   Understanding Ordinary Conversation 4   Taking Medications 4   Remembering Where Things Are Placed or Put Away 4   Remembering List of 4-5 Errands 4   Taking Care of Complicated Tasks 3   Applied Cognition Raw Score 22   Applied Cognition Standardized Score 47.83   End of Consult   Education Provided Yes;Family or social support of family present for education by provider   Patient Position at End of Consult Bedside chair;Bed/Chair alarm activated; All needs within reach   Nurse Communication Nurse aware of consult     GOALS    1) Pt will complete rolling left/right in bed with Min assist, as prerequisite for further engagement in ADLS   2) Pt will complete supine to sit transfer with Min A using B/L UEs to initiate bed mobility   3) Pt will tolerate sitting at EOB 20 minutes with S assist and stable vital signs, as prerequisite for further engagement in ADLS   4) Pt will complete grooming task with S assist and increased time to increase independence in functional tasks  5) Pt will increase B/L UE ROM 1/2 MMT to increase functional UB use during ADLS   6) Pt will complete UB ADLS with S and use of AD/DME as needed to increase independence in functional tasks  7) Pt will complete LB ADLS with Min A and use of AD/DME as needed to increase independence in functional tasks  8) Pt will complete sit to stand transfer / sit pivot transfer / stand pivot transfer with Min assist on/off all ADL surfaces   9) Pt will follow 100% simple one step verbal commands and be A/Ox4 consistently t/o use of external environmental cues to increase awareness for functional tasks  10) Pt will demonstrate 100% carryover of E.C. techniques t/o fx'l I/ADL/ leisure tasks w/o cues s/p skilled education  11) Pt will improve functional mobility to Min A w/ use of AD/DME as needed to increase engagement in meaningful tasks    Lainey Eugene MS, OTR/L

## 2023-06-19 NOTE — ASSESSMENT & PLAN NOTE
Assessment:   · Last hemoglobin A1c 5.0% on 6/15/23    Plan:   · Continue sliding scale regimen q6h  · Lantus 10 units qHS   · Adjust regimen as needed to maintain -180   · NPO for now  · Holding home oral antihyperglycemics:  · Metformin 1000mg daily

## 2023-06-19 NOTE — PROGRESS NOTES
NEPHROLOGY PROGRESS NOTE   Alli Hernandez 81 y.o. male MRN: [id number]  [facility]: [id number]      ASSESSMENT & PLAN:  1 oligoanuric acute kidney injury complicated with severe hyperkalemia and metabolic acidosis. Per previous note suspected secondary to septic ATN plus minus diuretic use plus minus contrast oxygen nephropathy. Status post emergent IHD on 6/14 for hyperkalemia. Eventually transitioned to CRRT, CRRT discontinued 3 hours ago  No urgent indication for renal replacement therapy at this moment. Awaiting for new HD line. Anticipate HD treatment tomorrow    2 shock, suspected mixed septic shock, acidosis, hypovolemic, resolved. Currently off inotropic/pressor support  Keep MAP more than 65 mmHg    3 severe hyperkalemia in the setting of severe REMA, resolved after HD treatment, currently off CRRT    4. hypophosphatemia, continue with phosphorus replacement, follow daily labs    5 anemia, monitor H&H suspect a component of acute blood loss, possible GI bleeding    6 severe acidosis, lactic acidosis, concern for metformin toxicity, resolved    7 chronic lower extremity wounds and lymphedema    #8 Access, patient with temporary HD catheter, IR was consulted for tunneled HD catheter    Plan and recommendation were discussed with critical care team, they agree with the plan, off CRRT now, plan for intermittent HD tomorrow        SUBJECTIVE:  Patient seen and examined, awake and interactive, patient wife at bedside. Patient currently denies any chest pain, no shortness of breath, no abdominal pain, no nausea or vomiting. CRRT was stopped at 5 AM this morning as per nurse.   Currently remains off inotropic/pressure support with a stable blood pressure    OBJECTIVE:  Current Weight: Weight - Scale: (!) 140 kg (307 lb 8.7 oz)  Vitals:    06/19/23 0600   BP:    Pulse: 70   Resp: (!) 8   Temp:    SpO2: 100%       Intake/Output Summary (Last 24 hours) at 6/19/2023 0889  Last data filed at 6/19/2023 0500  Gross per 24 hour   Intake 1757 ml   Output 2632 ml   Net -875 ml       General: Chronically ill, conscious, cooperative, in not acute distress  Eyes: conjunctivae pale, anicteric sclerae  ENT: lips and mucous membranes moist  Neck: supple, no JVD  Chest: Coarse breath sounds bilateral, no crackles, ronchus or wheezings  CVS: distinct S1 & S2, normal rate, regular rhythm  Abdomen: soft, non-tender, non-distended, normoactive bowel sounds  Extremities: Positive edema of both legs  Skin: no rash  Neuro: awake, alert, interactive        Medications:    Current Facility-Administered Medications:   •  acetaminophen (TYLENOL) tablet 650 mg, 650 mg, Oral, Q6H PRN, Osvaldo Leyva MD, 650 mg at 06/18/23 0846  •  cefTRIAXone (ROCEPHIN) 2,000 mg in dextrose 5 % 50 mL IVPB, 2,000 mg, Intravenous, Q24H, Daniela Martin DO, Stopped at 06/18/23 1300  •  chlorhexidine (PERIDEX) 0.12 % oral rinse 15 mL, 15 mL, Mouth/Throat, Q12H [address], Caren Mcintosh MD, 15 mL at 06/18/23 2017  •  cholecalciferol (VITAMIN D) oral liquid 1,000 Units, 1,000 Units, Oral, Daily, Caren Mcintosh MD, 1,000 Units at 06/18/23 0846  •  diphenhydramine, lidocaine, Al/Mg hydroxide, simethicone (Magic Mouthwash) oral solution 10 mL, 10 mL, Swish & Spit, Q6H PRN, Reno Tomas PA-C  •  HYDROmorphone HCl (DILAUDID) injection 0.2 mg, 0.2 mg, Intravenous, Q3H PRN, Summer Hernandez MD, 0.2 mg at 06/19/23 0150  •  insulin lispro (HumaLOG) 100 units/mL subcutaneous injection 2-12 Units, 2-12 Units, Subcutaneous, 4x Daily (with meals and at bedtime), 2 Units at 06/18/23 2155 **AND** Fingerstick Glucose (POCT), , , 4x Daily AC and at bedtime, Osvaldo Leyva MD  •  lidocaine (LIDODERM) 5 % patch 1 patch, 1 patch, Topical, Daily, Caren Mcintosh MD, 1 patch at 06/18/23 1006  •  lidocaine (LMX) 4 % cream, , Topical, 4x Daily PRN, Fozia Bautista MD, Given at 06/18/23 1640  •  norepinephrine (LEVOPHED) 8 mg (DOUBLE CONCENTRATION) IV in sodium chloride 0.9% 250 mL, 1-30 mcg/min, Intravenous, Titrated, Julianne Giron MD, Stopped at 06/17/23 0306  •  pantoprazole (PROTONIX) injection 40 mg, 40 mg, Intravenous, Q12H [facility], [facility] D Herrera, DO, 40 mg at 06/18/23 2017  •  polyethylene glycol (MIRALAX) packet 17 g, 17 g, Oral, Daily, Julianne Giron MD  •  senna (SENOKOT) tablet 17.2 mg, 2 tablet, Oral, HS, Julianne Giron MD  •  sodium phosphate 21 mmol in dextrose 5 % 250 mL Infusion, 21 mmol, Intravenous, Once, Meaghan Bautista MD  •  trimethobenzamide [place]) IM injection 200 mg, 200 mg, Intramuscular, Q6H PRN, Shani Peres MD, 200 mg at 06/18/23 1640  •  vasopressin (PITRESSIN) 20 Units in sodium chloride 0.9 % 100 mL infusion, 0.04 Units/min, Intravenous, Continuous, Julianne Giron MD, Stopped at 06/17/23 1301    Facility-Administered Medications Ordered in Other Encounters:   •  sodium chloride 0.9 % infusion, 75 mL/hr, Intravenous, Continuous, Maximus Bowles MD, Stopped at 05/26/23 1426    Invasive Devices:        Lab Results:   Results from last 7 days   Lab Units 06/19/23  0550 06/19/23  0001 06/18/23  1806 06/18/23  1240 06/18/23  0617 06/17/23  1822 06/17/23  1217 06/16/23  0103 06/15/23  2010 06/15/23  1949 06/15/23  0032 06/14/23  2249 06/14/23  2235 06/14/23  1851 06/14/23  1520   0000   WBC Thousand/uL 12.64*  --   --   --  16.47*  --  11.98*   < > 14.86*  --    < >  --  6.14  --  6.08  --    HEMOGLOBIN g/dL 7.3*  --  7.6*  --  7.6*   < > 7.9*   < > 8.7*  --    < >  --  8.4*  --  9.4*  --    I STAT HEMOGLOBIN g/dl  --   --   --   --   --   --   --   --   --  8.5*  --  8.8*  --   --   --    < >   HEMATOCRIT % 24.6*  --  24.1*  --  24.3*   < > 24.9*   < > 28.3*  --    < >  --  27.6*  --  31.7*  --    HEMATOCRIT, ISTAT %  --   --   --   --   --   --   --   --   --  25*  --  26*  --   --   --    < >   PLATELETS Thousands/uL 35*  --   --   --  61*  --  78*   < > 171  --    < >  --  266  --  274  --    SODIUM mmol/L 138 139 138   < > 136   < > 135   < > 132*  --    < >  --  132*   < > 130*  --    POTASSIUM mmol/L 4.0 4.2 3.8   < > 4.0   < > 4.2   < > 5.4*  --    < >  --  6.9*   < > 7.4*  --    CHLORIDE mmol/L 108 109* 107   < > 106   < > 105   < > 100  --    < >  --  104   < > 102  --    CO2 mmol/L 27 28 28   < > 27   < > 28   < > 18*  --    < >  --  11*   < > 12*  --    CO2, I-STAT mmol/L  --   --   --   --   --   --   --   --   --  23  --  12*  --   --   --    < >   BUN mg/dL 12 12 13   < > 14   < > 17   < > 34*  --    < >  --  80*   < > 81*  --    CREATININE mg/dL 1.05 1.18 1.31*   < > 1.36*   < > 1.74*   < > 3.89*  --    < >  --  8.19*   < > 8.70*  --    CALCIUM mg/dL 8.8 8.8 8.8   < > 9.0   < > 9.0   < > 9.1  --    < >  --  9.5   < > 9.2  --    MAGNESIUM mg/dL 2.0 1.9 1.8   < > 2.2   < > 1.9   < > 2.3  --    < >  --  1.9  --  2.1  --    PHOSPHORUS mg/dL 1.8* 1.7* 1.8*   < > 2.3   < > 2.4   < > 4.5*  --    < >  --  7.7*  --   --   --    ALK PHOS U/L  --   --   --   --   --   --   --   --  101  --   --   --  92  --  105  --    ALT U/L  --   --   --   --   --   --   --   --  30  --   --   --  20  --  19  --    AST U/L  --   --   --   --   --   --   --   --  58*  --   --   --  33  --  28  --    GLUCOSE, ISTAT mg/dl  --   --   --   --   --   --   --   --   --  194*  --  91  --   --   --   --     < > = values in this interval not displayed. Previous work up:  See previous notes      Portions of the record may have been created with voice recognition software. Occasional wrong word or "sound a like" substitutions may have occurred due to the inherent limitations of voice recognition software. Read the chart carefully and recognize, using context, where substitutions have occurred. If you have any questions, please contact the dictating provider.

## 2023-06-19 NOTE — PLAN OF CARE
Problem: METABOLIC, FLUID AND ELECTROLYTES - ADULT  Goal: Electrolytes maintained within normal limits  Description: INTERVENTIONS:  - Monitor labs and assess patient for signs and symptoms of electrolyte imbalances  - Administer electrolyte replacement as ordered  - Monitor response to electrolyte replacements, including repeat lab results as appropriate  - Instruct patient on fluid and nutrition as appropriate  Outcome: Progressing  Goal: Fluid balance maintained  Description: INTERVENTIONS:  - Monitor labs   - Monitor I/O and WT  - Instruct patient on fluid and nutrition as appropriate  - Assess for signs & symptoms of volume excess or deficit  Outcome: Progressing  Goal: Glucose maintained within target range  Description: INTERVENTIONS:  - Monitor Blood Glucose as ordered  - Assess for signs and symptoms of hyperglycemia and hypoglycemia  - Administer ordered medications to maintain glucose within target range  - Assess nutritional intake and initiate nutrition service referral as needed  Outcome: Progressing     Problem: MOBILITY - ADULT  Goal: Maintain or return to baseline ADL function  Description: INTERVENTIONS:  -  Assess patient's ability to carry out ADLs; assess patient's baseline for ADL function and identify physical deficits which impact ability to perform ADLs (bathing, care of mouth/teeth, toileting, grooming, dressing, etc.)  - Assess/evaluate cause of self-care deficits   - Assess range of motion  - Assess patient's mobility; develop plan if impaired  - Assess patient's need for assistive devices and provide as appropriate  - Encourage maximum independence but intervene and supervise when necessary  - Involve family in performance of ADLs  - Assess for home care needs following discharge   - Consider OT consult to assist with ADL evaluation and planning for discharge  - Provide patient education as appropriate  Outcome: Progressing  Goal: Maintains/Returns to pre admission functional level  Description: INTERVENTIONS:  - Perform BMAT or MOVE assessment daily.   - Set and communicate daily mobility goal to care team and patient/family/caregiver. - Collaborate with rehabilitation services on mobility goals if consulted  - Out of bed for toileting  - Record patient progress and toleration of activity level   Outcome: Progressing     Problem: Prexisting or High Potential for Compromised Skin Integrity  Goal: Skin integrity is maintained or improved  Description: INTERVENTIONS:  - Identify patients at risk for skin breakdown  - Assess and monitor skin integrity  - Assess and monitor nutrition and hydration status  - Monitor labs   - Assess for incontinence   - Turn and reposition patient  - Assist with mobility/ambulation  - Relieve pressure over bony prominences  - Avoid friction and shearing  - Provide appropriate hygiene as needed including keeping skin clean and dry  - Evaluate need for skin moisturizer/barrier cream  - Collaborate with interdisciplinary team   - Patient/family teaching  - Consider wound care consult   Outcome: Progressing     Problem: Nutrition/Hydration-ADULT  Goal: Nutrient/Hydration intake appropriate for improving, restoring or maintaining nutritional needs  Description: Monitor and assess patient's nutrition/hydration status for malnutrition. Collaborate with interdisciplinary team and initiate plan and interventions as ordered. Monitor patient's weight and dietary intake as ordered or per policy. Utilize nutrition screening tool and intervene as necessary. Determine patient's food preferences and provide high-protein, high-caloric foods as appropriate.      INTERVENTIONS:  - Monitor oral intake, urinary output, labs, and treatment plans  - Assess nutrition and hydration status and recommend course of action  - Evaluate amount of meals eaten  - Assist patient with eating if necessary   - Allow adequate time for meals  - Recommend/ encourage appropriate diets, oral nutritional supplements, and vitamin/mineral supplements  - Order, calculate, and assess calorie counts as needed  - Recommend, monitor, and adjust tube feedings and TPN/PPN based on assessed needs  - Assess need for intravenous fluids  - Provide specific nutrition/hydration education as appropriate  - Include patient/family/caregiver in decisions related to nutrition  Outcome: Progressing     Problem: PAIN - ADULT  Goal: Verbalizes/displays adequate comfort level or baseline comfort level  Description: Interventions:  - Encourage patient to monitor pain and request assistance  - Assess pain using appropriate pain scale  - Administer analgesics based on type and severity of pain and evaluate response  - Implement non-pharmacological measures as appropriate and evaluate response  - Consider cultural and social influences on pain and pain management  - Notify physician/advanced practitioner if interventions unsuccessful or patient reports new pain  Outcome: Progressing     Problem: INFECTION - ADULT  Goal: Absence or prevention of progression during hospitalization  Description: INTERVENTIONS:  - Assess and monitor for signs and symptoms of infection  - Monitor lab/diagnostic results  - Monitor all insertion sites, i.e. indwelling lines, tubes, and drains  - Monitor endotracheal if appropriate and nasal secretions for changes in amount and color  - [place] appropriate cooling/warming therapies per order  - Administer medications as ordered  - Instruct and encourage patient and family to use good hand hygiene technique  - Identify and instruct in appropriate isolation precautions for identified infection/condition  Outcome: Progressing     Problem: SAFETY ADULT  Goal: Maintain or return to baseline ADL function  Description: INTERVENTIONS:  -  Assess patient's ability to carry out ADLs; assess patient's baseline for ADL function and identify physical deficits which impact ability to perform ADLs (bathing, care of mouth/teeth, toileting, grooming, dressing, etc.)  - Assess/evaluate cause of self-care deficits   - Assess range of motion  - Assess patient's mobility; develop plan if impaired  - Assess patient's need for assistive devices and provide as appropriate  - Encourage maximum independence but intervene and supervise when necessary  - Involve family in performance of ADLs  - Assess for home care needs following discharge   - Consider OT consult to assist with ADL evaluation and planning for discharge  - Provide patient education as appropriate  Outcome: Progressing  Goal: Maintains/Returns to pre admission functional level  Description: INTERVENTIONS:  - Perform BMAT or MOVE assessment daily.   - Set and communicate daily mobility goal to care team and patient/family/caregiver.    - Collaborate with rehabilitation services on mobility goals if consulted  - Out of bed for toileting  - Record patient progress and toleration of activity level   Outcome: Progressing  Goal: Patient will remain free of falls  Description: INTERVENTIONS:  - Educate patient/family on patient safety including physical limitations  - Instruct patient to call for assistance with activity   - Consult OT/PT to assist with strengthening/mobility   - Keep Call bell within reach  - Keep bed low and locked with side rails adjusted as appropriate  - Keep care items and personal belongings within reach  - Initiate and maintain comfort rounds  - Make Fall Risk Sign visible to staff  - Apply yellow socks and bracelet for high fall risk patients  - Consider moving patient to room near nurses station  Outcome: Progressing     Problem: DISCHARGE PLANNING  Goal: Discharge to home or other facility with appropriate resources  Description: INTERVENTIONS:  - Identify barriers to discharge w/patient and caregiver  - Arrange for needed discharge resources and transportation as appropriate  - Identify discharge learning needs (meds, wound care, etc.)  - Arrange for interpretive services to assist at discharge as needed  - Refer to Case Management Department for coordinating discharge planning if the patient needs post-hospital services based on physician/advanced practitioner order or complex needs related to functional status, cognitive ability, or social support system  Outcome: Progressing     Problem: Knowledge Deficit  Goal: Patient/family/caregiver demonstrates understanding of disease process, treatment plan, medications, and discharge instructions  Description: Complete learning assessment and assess knowledge base.   Interventions:  - Provide teaching at level of understanding  - Provide teaching via preferred learning methods  Outcome: Progressing     Problem: RESPIRATORY - ADULT  Goal: Achieves optimal ventilation and oxygenation  Description: INTERVENTIONS:  - Assess for changes in respiratory status  - Assess for changes in mentation and behavior  - Position to facilitate oxygenation and minimize respiratory effort  - Oxygen administered by appropriate delivery if ordered  - Initiate smoking cessation education as indicated  - Encourage broncho-pulmonary hygiene including cough, deep breathe, Incentive Spirometry  - Assess the need for suctioning and aspirate as needed  - Assess and instruct to report SOB or any respiratory difficulty  - Respiratory Therapy support as indicated  Outcome: Progressing     Problem: GENITOURINARY - ADULT  Goal: Maintains or returns to baseline urinary function  Description: INTERVENTIONS:  - Assess urinary function  - Encourage oral fluids to ensure adequate hydration if ordered  - Administer IV fluids as ordered to ensure adequate hydration  - Administer ordered medications as needed  - Offer frequent toileting  - Follow urinary retention protocol if ordered  Outcome: Progressing     Problem: MUSCULOSKELETAL - ADULT  Goal: Maintain or return mobility to safest level of function  Description: INTERVENTIONS:  - Assess patient's ability to carry out ADLs; assess patient's baseline for ADL function and identify physical deficits which impact ability to perform ADLs (bathing, care of mouth/teeth, toileting, grooming, dressing, etc.)  - Assess/evaluate cause of self-care deficits   - Assess range of motion  - Assess patient's mobility  - Assess patient's need for assistive devices and provide as appropriate  - Encourage maximum independence but intervene and supervise when necessary  - Involve family in performance of ADLs  - Assess for home care needs following discharge   - Consider OT consult to assist with ADL evaluation and planning for discharge  - Provide patient education as appropriate  Outcome: Progressing

## 2023-06-19 NOTE — ASSESSMENT & PLAN NOTE
Admission platelets of 800. Steady downtrend this admission, most recent platelet count of 35. One instance of melena documented on 6/16 but at the time patient had platelete count in the 120k-130k. Unclear eitiology but likely 2/2 acute sepsis vs CVVH and filter clotting. Patient did receive heparin intermittently this admission however 4T score of 3 and platelets have not responded to discontinuation of heparin products. Possibly cephalosporin induced, but patient is s/p 4 days of cefepime and then transitioned to ceftriaxone.      Plan:  · Likely 2/2 sepsis  · No acute signs of bleeding on exam  · S/p 1 U platelets 8/19 in preparation for IR permacath placement  · Trend CBC  · Holding pharmacologic AC/DVT prophylaxis

## 2023-06-19 NOTE — ASSESSMENT & PLAN NOTE
2/2 MDR E.coli urosepsis. Briefly required ICU stay and pressor support. Currently off pressors. Abx day 6, s/p 4 days of cefepime, currently day 2 of ceftriaxone. Plan:  · Continue ceftriaxone for a total abx duration of 7 days.

## 2023-06-19 NOTE — SEDATION DOCUMENTATION
Tunneled dialysis catheter placement completed by Dr. Davey Nguyen without complications. Tolerated well. Report called to MICU RN. To transport to MICU by IR staff.

## 2023-06-19 NOTE — PLAN OF CARE
Problem: PHYSICAL THERAPY ADULT  Goal: Performs mobility at highest level of function for planned discharge setting. See evaluation for individualized goals. Description: Treatment/Interventions: OT, Spoke to nursing, Gait training, Bed mobility, Patient/family training, Endurance training, LE strengthening/ROM, Functional transfer training          See flowsheet documentation for full assessment, interventions and recommendations. Note: Prognosis: Good  Problem List: Decreased strength, Decreased range of motion, Decreased endurance, Impaired balance, Decreased mobility, Decreased coordination, Decreased cognition, Decreased safety awareness, Impaired judgement, Pain  Assessment: Pt is 81 y.o. male seen for PT evaluation s/p admit to [address] [place] [address] on 6/14/2023 w/ REMA (acute kidney injury) ([address]), shock,m acidosis, toxic metabolic encephalopathy. PT consulted to assess pt's functional mobility and d/c needs. Order placed for PT eval and tx, w/ up w/ A order. Comorbidities affecting pt's physical performance at time of assessment include:  has a past medical history of Anemia, Arthritis, Atrial fibrillation (HCC), Basal cell carcinoma, CHF (congestive heart failure) ([address]), Diabetes mellitus ([address]), DVT (deep vein thrombosis) in pregnancy, DVT (deep venous thrombosis) ([address]), Dyslipidemia, Encephalopathy acute, GERD (gastroesophageal reflux disease), Hyperlipidemia, Hypertension, Hypomagnesemia, Irregular heart beat, Morbid obesity due to excess calories ([address]), Pulmonary embolism ([address]), Sepsis ([address]), Squamous cell skin cancer, and Visual impairment. PTA, pt was ambulates unrestricted distances and all terrain.  Personal factors affecting pt at time of IE include: inaccessible home environment, ambulating w/ assistive device, stairs to enter home, inability to ambulate household distances, unable to perform physical activity, limited insight into impairments, inability to perform IADLs and inability to perform ADLs. Please find objective findings from PT assessment regarding body systems outlined above with impairments and limitations including weakness, impaired balance, decreased endurance, impaired coordination, gait deviations, pain, decreased activity tolerance, decreased functional mobility tolerance, decreased safety awareness, impaired judgement, fall risk and orthopedic restrictions. Pt required increased A for LE management and to upright trunk during bed mobility. Tolerated sitting EOB without increased A although reports mild increase in dizziness, BP stable. Required increased A to complete transfers with deficits in strength. Ambulated with slow moderately unsteady gait. The following objective measures performed on IE also reveal limitations: The patient's AM-PAC Basic Mobility Inpatient Short Form Raw Score is 11, Standardized Score is 30.25. A standardized score less than 42.9 suggests the patient may benefit from discharge to post-acute rehabilitation services. Please also refer to the recommendation of the Physical Therapist for safe discharge planning. PT Discharge Recommendation: Post acute rehabilitation services    See flowsheet documentation for full assessment.

## 2023-06-19 NOTE — ASSESSMENT & PLAN NOTE
Metabolic acidosis in the setting of REMA and lactic acidosis, potentially 2/2 to metformin intoxication vs acute infection. Plan:  · Holding metformin  · Metformin level sent out, pending.    · Antibiotics as above

## 2023-06-19 NOTE — PHYSICAL THERAPY NOTE
Physical Therapy Evaluation     Patient's Name: Renea Vera    Admitting Diagnosis  Hyperkalemia [E87.5]  UTI (urinary tract infection) [M26.9]  Metabolic acidosis [N58.64]  Weakness [R53.1]  Abdominal pain [R10.9]  Hypoglycemia [E16.2]  REMA (acute kidney injury) ([address]) [N17.9]  Generalized weakness [R53.1]    Problem List  Patient Active Problem List   Diagnosis    Obesity, unspecified obesity severity, unspecified obesity type    Diabetes ([address])    Hydrocele    Chronic anticoagulation    Acute on chronic anemia    Atrial fibrillation ([address])    History of AVR    PVD (peripheral vascular disease) ([address])    Thrombophlebitis    History of ventricular tachycardia    Edema    Acute on chronic diastolic CHF    Venous ulcer of right ankle    Peripheral venous insufficiency    Ventricular tachycardia    Secondary lymphedema    CKD (chronic kidney disease) stage 3, GFR 30-59 ml/min ([facility])    Morbid obesity    Benign prostatic hyperplasia with lower urinary tract symptoms    Acute cholecystitis    REMA (acute kidney injury) ([address])    Cardiomegaly    Chronic bilateral low back pain without sciatica    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    Dyslipidemia    Endocarditis    Hyperlipidemia    Osteoarthritis, knee    Renal cyst    Status post right knee replacement    Swelling of limb    BPH (benign prostatic hyperplasia)    History of venous thromboembolism    Acute on chronic cholecystitis    Cholecystostomy care (HCC)    Gout    Calculus of gallbladder    Extrahepatic biloma    COVID-19    Pleural effusion on right    Melena    Pulmonary embolism (HCC)    CHF (congestive heart failure) (HCC)    Fall    Acidosis    Hyponatremia    Hypophosphatemia    Prolonged QT interval    Thrombocytopenia (HCC)    Septic shock (HCC)       Past Medical History  Past Medical History:   Diagnosis Date    Anemia     Arthritis     Atrial fibrillation ([address])     Basal cell carcinoma 03/22/2022    Tip of Nose    CHF (congestive heart failure) (HCC)     Diabetes mellitus ([address])     Niddm    DVT (deep vein thrombosis) in pregnancy 1966    not in pregnancy    DVT (deep venous thrombosis) ([address]) 1966    Dyslipidemia     Encephalopathy acute 11/4/2022    GERD (gastroesophageal reflux disease)     Hyperlipidemia     Hypertension     Hypomagnesemia 10/19/2022    Irregular heart beat     Afib    Morbid obesity due to excess calories ([address])     Resolved 9/2/2014     Pulmonary embolism (HCC)     Sepsis ([address])     Squamous cell skin cancer 07/30/2020    Left posterior scalp    Visual impairment        Past Surgical History  Past Surgical History:   Procedure Laterality Date    AORTIC VALVE REPLACEMENT      CARDIAC DEFIBRILLATOR PLACEMENT  04/2014    CARDIAC SURGERY  02/2014    AVR    COLONOSCOPY      INSERT / REPLACE / REMOVE PACEMAKER      IR ASPIRATION BAKERS CYST  3/8/2023    IR CHOLECYSTOSTOMY TUBE CHECK/CHANGE/REPOSITION/REINSERTION/UPSIZE  10/13/2022    IR CHOLECYSTOSTOMY TUBE CHECK/CHANGE/REPOSITION/REINSERTION/UPSIZE  10/19/2022    IR CHOLECYSTOSTOMY TUBE CHECK/CHANGE/REPOSITION/REINSERTION/UPSIZE  10/27/2022    IR CHOLECYSTOSTOMY TUBE CHECK/CHANGE/REPOSITION/REINSERTION/UPSIZE  11/7/2022    IR CHOLECYSTOSTOMY TUBE CHECK/CHANGE/REPOSITION/REINSERTION/UPSIZE  11/10/2022    IR CHOLECYSTOSTOMY TUBE CHECK/CHANGE/REPOSITION/REINSERTION/UPSIZE  12/1/2022    IR CHOLECYSTOSTOMY TUBE CHECK/CHANGE/REPOSITION/REINSERTION/UPSIZE  1/6/2023    IR CHOLECYSTOSTOMY TUBE CHECK/CHANGE/REPOSITION/REINSERTION/UPSIZE  1/24/2023    IR CHOLECYSTOSTOMY TUBE CHECK/CHANGE/REPOSITION/REINSERTION/UPSIZE  3/15/2023    IR CHOLECYSTOSTOMY TUBE PLACEMENT  9/1/2022    IR DRAINAGE TUBE CHECK AND/OR REMOVAL  3/22/2023    IR DRAINAGE TUBE CHECK AND/OR REMOVAL  4/10/2023    IR DRAINAGE TUBE PLACEMENT  4/6/2023    JOINT REPLACEMENT Left     LTKR    MOHS SURGERY  07/30/2020    Left posterior scalp, Dr. Burt Base  04/18/2022    [address] [place] [address] Tip of Nose- Dr. Mitchell Meredith DIVJ&STRIP LONG SAPH SAPHFEM [place] Right 8/17/2018    Procedure: LEG PERFORATED INJECTION SCLEROTHERAPY;  Surgeon: Heather Lubin MD;  Location: [facility];  Service: Vascular    REPLACEMENT TOTAL KNEE Right     TOTAL KNEE ARTHROPLASTY Left     VASCULAR SURGERY      VENA CAVA FILTER PLACEMENT      Interruption inferior vena cava, [place] filter, placement    WISDOM TOOTH EXTRACTION          06/19/23 1414   PT Last Visit   PT Visit Date 06/19/23   Note Type   Note type Evaluation   Pain Assessment   Pain Assessment Tool 0-10   Pain Score No Pain   Patient's Stated Pain Goal No pain   Hospital Pain Intervention(s) Repositioned; Ambulation/increased activity   Restrictions/Precautions   Weight Bearing Precautions Per Order No   Other Precautions Pain; Fall Risk;Multiple lines;Telemetry;O2;Bed Alarm; Chair Alarm; Impulsive   Home Living   Type of [address] [place] [address] Dr One level;Performs ADLs on one level  (0 RADHA)   Bathroom Shower/Tub Walk-in shower   Bathroom Toilet Standard   Home Equipment Walker;Cane   Additional Comments Pt reports the house is a bilevel although pt and spouse live on the lower level and daughter and her family live upstairs   Prior Function   Level of [place] Independent with functional mobility   Lives With Spouse; Family   Receives Help From [facility] in the last 6 months 1 to 4  (1)   Vocational Full time employment   Comments + , works as  at [employer] 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  "I feel weak, I just don't want to fall"   RLE Assessment   RLE Assessment   (grossly 3-/5 with movement)   LLE Assessment   LLE Assessment   (grossly 3-/5 with movement)   Coordination   Movements are Fluid and Coordinated 0   Bed Mobility   Supine to Sit 3  Moderate assistance   Additional items Assist x 2;HOB elevated   Sit to Supine Unable to assess   Transfers   Sit to Stand 3  Moderate assistance   Additional items Assist x 2; Increased time required   Stand to Sit 3  Moderate assistance   Additional items Assist x 2; Increased time required   Ambulation/Elevation   Gait pattern Excessively slow; Short stride; Shuffling;Decreased foot clearance; Improper Weight shift   Gait Assistance 3  Moderate assist   Additional items Assist x 2   Assistive Device Other (Comment)  (HHA)   Distance 3'   Balance   Static Sitting Poor +   Dynamic Sitting Poor   Static Standing Poor   Dynamic Standing Poor   Ambulatory Zero   Endurance Deficit   Endurance Deficit Yes   Endurance Deficit Description limited by fatigue, weakness, SOB, dizziness   Activity Tolerance   Activity Tolerance Patient limited by fatigue;Patient limited by pain   Medical Staff Made Aware OT   Nurse Made Aware yes, nsg gave clearance to work with pt. Present during session to see progress   Assessment   Prognosis Good   Problem List Decreased strength;Decreased range of motion;Decreased endurance; Impaired balance;Decreased mobility; Decreased coordination;Decreased cognition;Decreased safety awareness; Impaired judgement;Pain   Assessment Pt is 81 y.o. male seen for PT evaluation s/p admit to [facility] on 6/14/2023 w/ REMA (acute kidney injury) ([address]), shock,m acidosis, toxic metabolic encephalopathy. PT consulted to assess pt's functional mobility and d/c needs. Order placed for PT eval and tx, w/ up w/ A order.  Comorbidities affecting pt's physical performance at time of assessment include:  has a past medical history of Anemia, Arthritis, Atrial fibrillation (HCC), Basal cell carcinoma, CHF (congestive heart failure) ([address]), Diabetes mellitus ([address]), DVT (deep vein thrombosis) in pregnancy, DVT (deep venous thrombosis) ([address]), Dyslipidemia, Encephalopathy acute, GERD (gastroesophageal reflux disease), Hyperlipidemia, Hypertension, Hypomagnesemia, Irregular heart beat, Morbid obesity due to excess calories ([address]), Pulmonary embolism ([address]), Sepsis ([address]), Squamous cell skin cancer, and Visual impairment. PTA, pt was ambulates unrestricted distances and all terrain. Personal factors affecting pt at time of IE include: inaccessible home environment, ambulating w/ assistive device, stairs to enter home, inability to ambulate household distances, unable to perform physical activity, limited insight into impairments, inability to perform IADLs and inability to perform ADLs. Please find objective findings from PT assessment regarding body systems outlined above with impairments and limitations including weakness, impaired balance, decreased endurance, impaired coordination, gait deviations, pain, decreased activity tolerance, decreased functional mobility tolerance, decreased safety awareness, impaired judgement, fall risk and orthopedic restrictions. Pt required increased A for LE management and to upright trunk during bed mobility. Tolerated sitting EOB without increased A although reports mild increase in dizziness, BP stable. Required increased A to complete transfers with deficits in strength. Ambulated with slow moderately unsteady gait. The following objective measures performed on IE also reveal limitations: The patient's AM-PAC Basic Mobility Inpatient Short Form Raw Score is 11, Standardized Score is 30.25. A standardized score less than 42.9 suggests the patient may benefit from discharge to post-acute rehabilitation services. Please also refer to the recommendation of the Physical Therapist for safe discharge planning. Pt's clinical presentation is currently unstable/unpredictable seen in pt's presentation of critical care monitoring. Pt to benefit from continued PT tx to address deficits as defined above and maximize level of functional independent mobility and consistency.  From PT/mobility standpoint, recommendation at time of d/c would be post acute rehabilitation services pending progress in order to facilitate return to PLOF. Goals   Patient Goals To improve independence   STG Expiration Date 07/01/23   Short Term Goal #1 1. Complete bed mobility and transfers I to decrease need for caregiver in home. 2. Ambulate 100' I to complete household without A. 3. Improve dynamic balance to good to decrease need for UE support during ambulation. 4. Be educated & demonstate 2 steps to be able to enter home without A. Plan   Treatment/Interventions OT; Spoke to nursing;Gait training;Bed mobility; Patient/family training; Endurance training;LE strengthening/ROM; Functional transfer training   PT Frequency 3-5x/wk   Recommendation   PT Discharge Recommendation Post acute rehabilitation services   AM-PAC Basic Mobility Inpatient   Turning in Flat Bed Without Bedrails 2   Lying on Back to Sitting on Edge of Flat Bed Without Bedrails 2   Moving Bed to Chair 2   Standing Up From Chair Using Arms 2   Walk in Room 2   Climb 3-5 Stairs With Railing 1   Basic Mobility Inpatient Raw Score 11   Basic Mobility Standardized Score 30.25   Highest Level Of Mobility   JH-HLM Goal 4: Move to chair/commode   JH-HLM Achieved 4: Move to chair/commode           Amos Lancaster, PT

## 2023-06-19 NOTE — ASSESSMENT & PLAN NOTE
Assessment:  · S/p Dual chamber ICD (12/3/2015)    Plan:  · Monitor telemetry   · Optimize electrolytes: K > 4.0, Mag > 2.0

## 2023-06-19 NOTE — ASSESSMENT & PLAN NOTE
Assessment:   · Superimposed on known CKD3.  Initial REMA related to septic shock with hypoperfusion which progressed to anuria and requirement of renal replacement threrapy  · Now likely cardiorenal in the setting of RV dysfunction and volume overload   · CRRT 6/14/23 - 6/19/23  · Baseline creatinine: 1.28 - 1.60  · Initial creatinine: 8.70 (6/14/23)   · Last creatinine: 1.72 (6/28/23)     Plan:   · Continue diuresis as detailed above to try and offload RV   · Strict q2h I/O monitoring  · Maintain alfred catheter for now  · Continue to follow renal function tests  · Nephrology consulted, appreciate recommendations

## 2023-06-19 NOTE — PROGRESS NOTES
-- Patient: Luis Miguel Hernadez  -- MRN: [id number]  -- Aidin Request ID: [id number]  -- Level of care reserved: [address]  -- Partner Reserved: [facility], [facility] [phone]  -- Clinical needs requested:  -- Geography searched: [phone]  -- Start of Service:  -- Request sent: 2:42pm EDT on 6/15/2023 by Diego Grant  -- Partner reserved: 9:55am EDT on 6/19/2023 by Diego Grant  -- Choice list shared: 9:55am EDT on 6/19/2023 by Diego Grant

## 2023-06-20 PROBLEM — R13.10 DYSPHAGIA: Status: ACTIVE | Noted: 2023-06-20

## 2023-06-20 NOTE — SPEECH THERAPY NOTE
Speech Language/Pathology  Attempted to f/u for dysphagia dx tx, pt is leaving on stretcher for HD at this time. Per nsg the pt was placed on puree/thin and had not had any coughing w/ the thin. Would ideally complete a VBS with the pt when he is able to go as he has had dysphagia for a long time prior to admit.

## 2023-06-20 NOTE — PROGRESS NOTES
Progress Note - Nephrology   Clarisse Arce 81 y.o. male MRN: [id number]  Unit/Bed#: [facility] 504-01 Encounter: [id number]    18-year-old male with a past history of CHF, atrial fibrillation, diabetes, who initially presented with fatigue, weakness loss of appetite and was found to have urinary tract infection, lactic acidosis, AGMA and potassium greater than 7 associated bradycardia and wide QRS complexes requiring emergent IHD 6/14/2023  and eventually transitioning to CRRT inwhich nephrology consulted and managing.      ASSESSMENT and PLAN:  Oligo anuric acute kidney injury  · Complicated by severe hyperkalemia and metabolic acidosis  Etiology: Suspect septic ATN, possible diuretic use contrast nephropathy  • Initiated on emergent IHD on 6/14/2023 for hyperkalemia  • Transition to CRRT-discontinued 6/19/2023  • Remains anuric  • Bladder scan 0 ml  • Current creatinine 1.83  • Electrolytes and acid-base balance within normal limits  • Plan for 3-hour HD treatment today  • Keep SBP greater than 120  • Continue to monitor for renal recovery  • Check BMP daily  • Baseline creatinine appears to be 1.6-2.0 back to 5/2022, previously 1.3-1.6    Access:  · Tunneled catheter placed via IR 6/19/2023-site intact    Chronic kidney disease IIIB:    Etiology:  Suspect secondary to  • Baseline Creatinine of 1.6-2.0 dating back to 2022 the setting of prior REMA/ATN/LORENA  • Previous baseline 1.3-1.6  • Previously seen with prior admission but no renal follow-up  • Recommend follow-up on discharge    Shock: Suspect mixed septic shock, acidosis, hypovolemia-resolved  · Previously required inotropic and pressor support-currently off  · Transferred out of ICU  · Management per primary team    Severe hyperkalemia-in the setting of severe REMA-resolved  · Required IHD and transition to CRRT-now off 6/19/2023  · Continue to monitor electrolytes daily    Hypophosphatemia-improved  · Required phosphorus replacement  · Current 2.7  · Continue to monitor and treat as needed    Severe acidosis/lactic acidosis-resolved  • Concern for metformin toxicity  • Continue to monitor for now    Anemia: Likely in the setting of acute blood loss possible GI bleed  · Management per primary team  · Hemoglobin 7.0  · That is post transfusion on 6/17/2023    Chronic lower extremity wounds and lymphedema  · Monitor with ongoing dialysis      Medical records through [facility] and care everywhere has been reviewed for this encounter      Review of systems  Patient seen and examined at bedside:  Review of Systems   Constitutional: Positive for fatigue. HENT: Negative. Eyes: Negative. Respiratory: Negative. Cardiovascular: Positive for leg swelling. Gastrointestinal: Negative. Endocrine: Negative. Genitourinary: Positive for decreased urine volume. Musculoskeletal: Negative. Skin: Negative. Allergic/Immunologic: Negative. Neurological: Negative. Hematological: Negative. Psychiatric/Behavioral: Negative. Physical exam:  Current Weight: Weight - Scale: (!) 140 kg (309 lb 8.4 oz)  Vitals:    06/19/23 1913 06/20/23 0546 06/20/23 0823 06/20/23 1025   BP: 130/68  124/67 124/69   BP Location:       Pulse: 88  87 83   Resp: 20      Temp:   98.2 °F (36.8 °C) 97.6 °F (36.4 °C)   TempSrc:       SpO2: 97%  96% 95%   Weight:  (!) 140 kg (309 lb 8.4 oz)     Height:           Intake/Output Summary (Last 24 hours) at 6/20/2023 1039  Last data filed at 6/20/2023 0901  Gross per 24 hour   Intake 1258 ml   Output 231 ml   Net 1027 ml       Physical Exam  Vitals and nursing note reviewed. Constitutional:       Appearance: He is obese. He is ill-appearing. Comments: NAD   HENT:      Head: Normocephalic and atraumatic. Nose: Nose normal.      Mouth/Throat:      Mouth: Mucous membranes are moist.      Pharynx: Oropharynx is clear. Eyes:      Extraocular Movements: Extraocular movements intact.       Conjunctiva/sclera: Conjunctivae normal. Neck:      Comments: Right chest tunneled catheter-site intact  Cardiovascular:      Rate and Rhythm: Normal rate and regular rhythm. Pulses: Normal pulses. Heart sounds: Normal heart sounds. Pulmonary:      Effort: Pulmonary effort is normal.      Breath sounds: Normal breath sounds. Comments: Decreased bases  Abdominal:      General: Bowel sounds are normal.      Palpations: Abdomen is soft. Musculoskeletal:      Cervical back: Normal range of motion and neck supple. Right lower leg: Edema present. Left lower leg: Edema present. Neurological:      General: No focal deficit present. Mental Status: He is alert and oriented to person, place, and time.    Psychiatric:         Mood and Affect: Mood normal.         Behavior: Behavior normal.            Medications:    Current Facility-Administered Medications:   •  acetaminophen (TYLENOL) tablet 650 mg, 650 mg, Oral, Q6H PRN, Kristian Bautista MD, 650 mg at 06/18/23 0846  •  cefTRIAXone (ROCEPHIN) 2,000 mg in dextrose 5 % 50 mL IVPB, 2,000 mg, Intravenous, Q24H, Kristian Bautista MD, Last Rate: 100 mL/hr at 06/20/23 1013, 2,000 mg at 06/20/23 1013  •  cholecalciferol (VITAMIN D) oral liquid 1,000 Units, 1,000 Units, Oral, Daily, Kristian Bautista MD, 1,000 Units at 06/20/23 0722  •  diphenhydramine, lidocaine, Al/Mg hydroxide, simethicone (Magic Mouthwash) oral solution 10 mL, 10 mL, Swish & Spit, Q6H PRN, Kristian Bautista MD  •  HYDROmorphone HCl (DILAUDID) injection 0.2 mg, 0.2 mg, Intravenous, Q3H PRN, Kristian Bautista MD, 0.2 mg at 06/20/23 1013  •  insulin lispro (HumaLOG) 100 units/mL subcutaneous injection 2-12 Units, 2-12 Units, Subcutaneous, 4x Daily (with meals and at bedtime), 2 Units at 06/18/23 2155 **AND** Fingerstick Glucose (POCT), , , 4x Daily AC and at bedtime, Kristian Bautista MD  •  lidocaine (LIDODERM) 5 % patch 1 patch, 1 patch, Topical, Daily, Kristian Bautista MD, 1 patch at 06/20/23 1007  •  lidocaine (LMX) 4 % cream, , Topical, 4x Daily PRN, Elizabeth Bautista MD, Given at 06/18/23 1640  •  pantoprazole (PROTONIX) injection 40 mg, 40 mg, Intravenous, Q12H [address], Elizabeth Bautista MD, 40 mg at 06/20/23 4069  •  polyethylene glycol (MIRALAX) packet 17 g, 17 g, Oral, Daily, Elizabeth Bautista MD  •  [facility]) tablet 17.2 mg, 2 tablet, Oral, HS, Elizabeth Bautista MD, 17.2 mg at 06/19/23 2206  •  trimethobenzamide (TIGAN) IM injection 200 mg, 200 mg, Intramuscular, Q6H PRN, Elizabeth Bautista MD, 200 mg at 06/18/23 1640    Facility-Administered Medications Ordered in Other Encounters:   •  sodium chloride 0.9 % infusion, 75 mL/hr, Intravenous, Continuous, Romualdo Galeazzi, MD, Stopped at 05/26/23 1426    Laboratory Results:  Results from last 7 days   Lab Units 06/20/23  0455 06/19/23  1446 06/19/23  0550 06/19/23  0001 06/18/23  1806 06/18/23  1240 06/18/23  0617 06/18/23  0011 06/17/23  1822 06/17/23  1217 06/17/23  0535 06/16/23  1206 06/16/23  0550 06/16/23  0103 06/15/23  2010 06/15/23  1949 06/15/23  0032 06/14/23  2249   WBC Thousand/uL 10.96*  --  12.64*  --   --   --  16.47*  --   --  11.98* 10.68*  --  13.25* 14.99*   < >  --    < >  --    HEMOGLOBIN g/dL 7.0*  --  7.3*  --  7.6*  --  7.6* 7.3*  --  7.9* 6.9*   < > 8.2* 7.9*   < >  --    < >  --    I STAT HEMOGLOBIN g/dl  --   --   --   --   --   --   --   --   --   --   --   --   --   --   --  8.5*  --  8.8*   HEMATOCRIT % 22.9*  --  24.6*  --  24.1*  --  24.3* 23.1*  --  24.9* 22.4*   < > 25.7* 25.4*   < >  --    < >  --    HEMATOCRIT, ISTAT %  --   --   --   --   --   --   --   --   --   --   --   --   --   --   --  25*  --  26*   PLATELETS Thousands/uL 67* 63* 35*  --   --   --  61*  --   --  78* 88*  --  123* 138*   < >  --    < >  --    SODIUM mmol/L 138  --  138 139 138 137 136 136   < > 135 137   < > 133* 133*   < >  --    < >  --    POTASSIUM mmol/L 3.9  --  4.0 4.2 3.8 4.0 4.0 4.1   < > 4.2 4.2   < > 5.1 5.1   < >  --    < >  --    CHLORIDE mmol/L 108  --  108 109* 107 109* 106 107   < > 105 105   < > 101 101   < >  --    < >  --    CO2 mmol/L 26  --  27 28 28 28 27 29   < > 28 28   < > 20* 20*   < >  --    < >  --    CO2, I-STAT mmol/L  --   --   --   --   --   --   --   --   --   --   --   --   --   --   --  23  --  12*   BUN mg/dL 18  --  12 12 13 12 14 15   < > 17 20   < > 33* 33*   < >  --    < >  --    CREATININE mg/dL 1.83*  --  1.05 1.18 1.31* 1.34* 1.36* 1.45*   < > 1.74* 1.99*   < > 3.35* 3.72*   < >  --    < >  --    CALCIUM mg/dL 8.7  --  8.8 8.8 8.8 8.7 9.0 8.6   < > 9.0 8.8   < > 9.1 9.0   < >  --    < >  --    MAGNESIUM mg/dL 1.9  --  2.0 1.9 1.8 2.0 2.2 1.9   < > 1.9 2.0   < > 2.4 2.4   < >  --    < >  --    PHOSPHORUS mg/dL 2.7  --  1.8* 1.7* 1.8* 1.8* 2.3 2.2*   < > 2.4 2.3   < > 3.7 3.9   < >  --    < >  --    GLUCOSE, ISTAT mg/dl  --   --   --   --   --   --   --   --   --   --   --   --   --   --   --  194*  --  91    < > = values in this interval not displayed. Portions of the record may have been created with voice recognition software. Occasional wrong word or "sound a like" substitutions may have occurred due to the inherent limitations of voice recognition software.  Read the chart carefully and recognize,

## 2023-06-20 NOTE — ASSESSMENT & PLAN NOTE
Patient with chronic macrocytic anemia baseline 7-8. · Did have a hemoglobin of 6.9 and received 1uPRCs for this   · hgb 7 yesterdya.  transfuse 1 unit PRBC with HD  · Daily CBCs  · Goal Hgb >7, replete as necessary  · GI following of melena  · EGD completed today: no ulcers, only gastritis/duodenitis  · Continue PPI  · Watch for s/s of GI bleed

## 2023-06-20 NOTE — ASSESSMENT & PLAN NOTE
Patient downgraded from critical care, admitted for septic shock secondary to UTI leading to REMA requiring dialysis. · Likely UTI was the initial cause of REMA, exacerbated by concurrent metformin and sotolol use.    · Currently aneuric  · Combs has been removed  · monitoring bladder with q6h bladder scans  · CVVH discontinued 6/19  · tunneled dialysis catheter placed on 6/19, will need intermittent hemodialysis  · Nephrology following   · Continue to trend Cr and electrolytes

## 2023-06-20 NOTE — PROGRESS NOTES
[address] [facility]  Progress Note  Name: Cornelio Acuna  MRN: [id number]  Unit/Bed#: [facility] 242-33 I Date of Admission: 6/14/2023   Date of Service: 6/20/2023 I Hospital Day: 6    Assessment/Plan   * REMA (acute kidney injury) [facility])  Assessment & Plan  Patient downgraded from critical care, admitted for septic shock secondary to UTI leading to REMA requiring dialysis. · Likely UTI was the initial cause of REMA, exacerbated by concurrent metformin and sotolol use. · Currently aneuric  · Combs has been removed  · monitoring bladder with q6h bladder scans  · CVVH discontinued 6/19  · tunneled dialysis catheter placed on 6/19, will need intermittent hemodialysis  · Nephrology following   · Continue to trend Cr and electrolytes    Dysphagia  Assessment & Plan  Patient noted to have coughing with diet this morning. Due to concern for dysphagia, downgraded to Pureed  Speech consult  VBS tomorrow    Septic shock [facility])  Assessment & Plan  2/2 MDR E.coli urosepsis. Briefly required ICU stay and pressor support. Currently off pressors. Abx day 6, s/p 4 days of cefepime, currently day 3 of ceftriaxone. · UCx shows Ecoli and klebsiella both susceptible to ceftriaxone  · Continue ceftriaxone for a total abx duration of 7 days. Thrombocytopenia ([address])  Assessment & Plan  Admission platelets of 989. Steady downtrend this admission, saranya at 35. One instance of melena documented on 6/16 but at the time patient had platelete count in the 120k-130k. Unclear eitiology but likely 2/2 acute sepsis vs CVVH and filter clotting. Patient did receive heparin intermittently this admission however platelets have not responded to discontinuation of heparin products. Possibly cephalosporin induced, but patient is s/p 4 days of cefepime and then transitioned to ceftriaxone.    · Likely 2/2 sepsis  · No acute signs of bleeding on exam  · S/p 1 U platelets 0/20 in preparation for IR permacath placement  · Trend CBC  · Holding pharmacologic AC/DVT prophylaxis  · uptrending platelets noted      Prolonged QT interval  Assessment & Plan  Noted on EKG. Avoid QTC prolonging medications. CHF (congestive heart failure) (HCC)  Assessment & Plan  Wt Readings from Last 3 Encounters:   06/20/23 (!) 140 kg (309 lb 8.4 oz)   06/07/23 136 kg (299 lb 13.2 oz)   06/02/23 130 kg (285 lb 8 oz)       Patient with a hx of HFpEF70%. · Patient euvolemic after CVVH  · Currently holding home torsemide in the setting of anuria. · permacath placed for intermittent HD      History of ventricular tachycardia  Assessment & Plan  S/p DUAL CHAMBER ICD/ NOT MRI CONDITIONAL . 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 · Warfarin held in the setting of thrombocytopenia  · Patient currently rate controlled, continuing to hold sotalol in the setting of anuria. Acute on chronic anemia  Assessment & Plan  Patient with chronic macrocytic anemia baseline 7-8. · Did have a hemoglobin of 6.9 and received 1uPRCs for this   · hgb 7 today. Will transfuse 1 unit PRBC with HD  · Daily CBCs  · Goal Hgb >7, replete as necessary  · GI following of melena, no acute intervention  · Continue PPI  · Watch for s/s of GI bleed    Diabetes [facility])  Assessment & Plan  Lab Results   Component Value Date    HGBA1C 5.0 06/15/2023       Recent Labs     06/19/23  1705 06/19/23  2206 06/20/23  0623 06/20/23  1023   POCGLU 121 144* 110 140       Blood Sugar Average: Last 72 hrs:  (P) 157.3125   Lab Results   Component Value Date    HGBA1C 5.0 06/15/2023       Recent Labs     06/19/23  1705 06/19/23  2206 06/20/23  0623 06/20/23  1023   POCGLU 121 144* 110 140       Blood Sugar Average: Last 72 hrs:  (P) 157.3125     Plan:  · Home regimen of metformin held in the setting of REMA and metabolic acidosis.    · Continue with SSI QID                 VTE Pharmacologic Prophylaxis: Pharmacologic: Pharmacologic VTE Prophylaxis contraindicated due to thrombocytopenia  Mechanical VTE Prophylaxis in Place: Yes    Patient Centered Rounds: I have performed bedside rounds with nursing staff today. Education and Discussions with Family / Patient: wife present at bedside         Current Length of Stay: 6 day(s)    Current Patient Status: Inpatient   Certification Statement: The patient will continue to require additional inpatient hospital stay due to IV abx, HD    Discharge Plan: likely another few days    Code Status: Level 1 - Full Code      Subjective:     Patient reports this morning to not feeling well. Reports of abdominal cramping and constipation. Discussed with RN who reports he had BM yesterday. No abdominal tenderness, and distention noted. patient has been coughing on current diet. Concern for dysphagia. Will downgrade to pureed. Had permacath placed u    Objective:     Vitals:   Temp (24hrs), Av.8 °F (36.6 °C), Min:97.6 °F (36.4 °C), Max:98.2 °F (36.8 °C)    Temp:  [97.6 °F (36.4 °C)-98.2 °F (36.8 °C)] 97.6 °F (36.4 °C)  HR:  [76-91] 83  Resp:  [11-20] 20  BP: ()/(43-69) 124/69  SpO2:  [94 %-99 %] 95 %  Body mass index is 40.84 kg/m². Input and Output Summary (last 24 hours): Intake/Output Summary (Last 24 hours) at 2023 1227  Last data filed at 2023 1100  Gross per 24 hour   Intake 1118 ml   Output 231 ml   Net 887 ml       Physical Exam:     Physical Exam  Vitals and nursing note reviewed. Constitutional:       General: He is not in acute distress. Appearance: Normal appearance. He is ill-appearing. HENT:      Head: Normocephalic and atraumatic. Right Ear: External ear normal.      Left Ear: External ear normal.      Nose: Nose normal.   Eyes:      Extraocular Movements: Extraocular movements intact. Cardiovascular:      Rate and Rhythm: Rhythm irregular.    Pulmonary:      Effort: Pulmonary effort is normal.   Abdominal: General: There is no distension. Palpations: Abdomen is soft. There is no mass. Musculoskeletal:      Cervical back: Normal range of motion. Neurological:      Mental Status: He is alert. Additional Data:     Labs:    Results from last 7 days   Lab Units 06/20/23  0455 06/16/23  0550 06/16/23  0103   WBC Thousand/uL 10.96*   < > 14.99*   HEMOGLOBIN g/dL 7.0*   < > 7.9*   HEMATOCRIT % 22.9*   < > 25.4*   PLATELETS Thousands/uL 67*   < > 138*   BANDS PCT %  --   --  15*   NEUTROS PCT % 74   < >  --    LYMPHS PCT % 13*   < >  --    LYMPHO PCT %  --   --  1*   MONOS PCT % 9   < >  --    MONO PCT %  --   --  0*   EOS PCT % 2   < > 0    < > = values in this interval not displayed. Results from last 7 days   Lab Units 06/20/23  0455 06/19/23  0550   SODIUM mmol/L 138 138   POTASSIUM mmol/L 3.9 4.0   CHLORIDE mmol/L 108 108   CO2 mmol/L 26 27   BUN mg/dL 18 12   CREATININE mg/dL 1.83* 1.05   ANION GAP mmol/L 4 3*   CALCIUM mg/dL 8.7 8.8   ALBUMIN g/dL  --  2.6*   TOTAL BILIRUBIN mg/dL  --  1.45*   ALK PHOS U/L  --  79   ALT U/L  --  23   AST U/L  --  39   GLUCOSE RANDOM mg/dL 117 112     Results from last 7 days   Lab Units 06/20/23  0604   INR  1.84*     Results from last 7 days   Lab Units 06/20/23  1023 06/20/23  0623 06/19/23  2206 06/19/23  1705 06/19/23  1143 06/19/23  0742 06/18/23  2044 06/18/23  1603 06/18/23  1112 06/18/23  0618 06/18/23  0008 06/17/23  1826   POC GLUCOSE mg/dl 140 110 144* 121 124 116 159* 163* 201* 152* 147* 196*     Results from last 7 days   Lab Units 06/15/23  0526   HEMOGLOBIN A1C % 5.0     Results from last 7 days   Lab Units 06/17/23  1217 06/17/23  0535 06/17/23  0013 06/16/23  2033 06/14/23  1926 06/14/23  1851   LACTIC ACID mmol/L 1.6 1.8 2.5* 3.6*   < >  --    PROCALCITONIN ng/ml  --   --   --   --   --  0.25    < > = values in this interval not displayed.               Recent Cultures (last 7 days):     Results from last 7 days   Lab Units 06/14/23  2586 06/14/23  1900 06/14/23  1851   BLOOD CULTURE   --  No Growth After 5 Days. No Growth After 5 Days. URINE CULTURE  50,000-59,000 cfu/ml Escherichia coli*  10,000-19,000 cfu/ml Klebsiella oxytoca*  <10,000 cfu/ml Alpha Hemolytic Streptococcus NOT Enterococcus*  --   --        Last 24 Hours Medication List:   Current Facility-Administered Medications   Medication Dose Route Frequency Provider Last Rate   • acetaminophen  650 mg Oral Q6H PRN Mearl Mode Day, MD     • cefTRIAXone  2,000 mg Intravenous Q24H Mearl Mode Day, MD 2,000 mg (06/20/23 1013)   • cholecalciferol  1,000 Units Oral Daily Mearl Mode Day, MD     • diphenhydramine, lidocaine, Al/Mg hydroxide, simethicone  10 mL Swish & Spit Q6H PRN Mearl Mode Day, MD     • HYDROmorphone  0.2 mg Intravenous Q3H PRN Mearl Mode Day, MD     • insulin lispro  2-12 Units Subcutaneous 4x Daily (with meals and at bedtime) Mearl Mode Day, MD     • lidocaine  1 patch Topical Daily Mearl Mode Day, MD     • lidocaine   Topical 4x Daily PRN Mearl Mode Day, MD     • pantoprazole  40 mg Intravenous Q12H [address] Mearl Mode Day, MD     • polyethylene glycol  17 g Oral Daily Mearl Mode Day, MD     • senna  2 tablet Oral HS Mearl Mode Day, MD     • trimethobenzamide  200 mg Intramuscular Q6H PRN Mearl Mode MD Lily       Facility-Administered Medications Ordered in Other Encounters   Medication Dose Route Frequency Provider Last Rate   • sodium chloride  75 mL/hr Intravenous Continuous Puma Montez MD Stopped (05/26/23 1426)        Today, Patient Was Seen By: Marybeth White MD    ** Please Note: Dictation voice to text software may have been used in the creation of this document.  **

## 2023-06-20 NOTE — ASSESSMENT & PLAN NOTE
Lab Results   Component Value Date    HGBA1C 5.0 06/15/2023       Recent Labs     06/19/23  1705 06/19/23  2206 06/20/23  0623 06/20/23  1023   POCGLU 121 144* 110 140       Blood Sugar Average: Last 72 hrs:  (P) 157.3125   Lab Results   Component Value Date    HGBA1C 5.0 06/15/2023       Recent Labs     06/19/23  1705 06/19/23  2206 06/20/23  0623 06/20/23  1023   POCGLU 121 144* 110 140       Blood Sugar Average: Last 72 hrs:  (P) 157.3125     Plan:  · Home regimen of metformin held in the setting of REMA and metabolic acidosis.    · Continue with SSI QID

## 2023-06-20 NOTE — PHYSICAL THERAPY NOTE
Physical Therapy Cancellation Note         06/20/23 1309   PT Last Visit   PT Visit Date 06/20/23   Note Type   Note Type Cancelled Session   Cancel Reasons Patient off floor/hemodialysis     DARIEN HOFFMANN PT, DPT

## 2023-06-20 NOTE — ASSESSMENT & PLAN NOTE
2/2 MDR E.coli urosepsis. Briefly required ICU stay and pressor support. Currently off pressors. Abx day 6, s/p 4 days of cefepime, currently day 3 of ceftriaxone. · UCx shows Ecoli and klebsiella both susceptible to ceftriaxone  · Continue ceftriaxone for a total abx duration of 7 days.

## 2023-06-20 NOTE — PLAN OF CARE
Goal is to Uf up to 2 L as tolerated, keeping systolic > 325, on a 4 K+ bath for a morning serum K+ of 3.9, 3 HR TX. OKAY to use HD CVC as per Dr Didier Harrington in imaging report of CVC placement viewed before initiation of dialysis. Dr Jossy Pa is aware and agreeable of goal, bath and . Goal was not increased with 350 cc ob blood administration as per Dr Jossy Pa d/t BP systolic at 860 and we are to keep BP above 120. With 1 HR and 15 mins left of TX, pt stated he has stomach cramps and his BP dropped to 116/62; min UF was turned on and Dr Jossy Pa was advised. Blood transfusion ended at that time as well. Pt given 100 cc of NSS to help improve stomach cramps. Pt c/o back pain, dilaudid 0.2 mg given. Problem: METABOLIC, FLUID AND ELECTROLYTES - ADULT  Goal: Electrolytes maintained within normal limits  Description: INTERVENTIONS:  - Monitor labs and assess patient for signs and symptoms of electrolyte imbalances  - Administer electrolyte replacement as ordered  - Monitor response to electrolyte replacements, including repeat lab results as appropriate  - Instruct patient on fluid and nutrition as appropriate  Outcome: Progressing  Goal: Fluid balance maintained  Description: INTERVENTIONS:  - Monitor labs   - Monitor I/O and WT  - Instruct patient on fluid and nutrition as appropriate  - Assess for signs & symptoms of volume excess or deficit  Outcome: Progressing     Post-Dialysis RN Treatment Note    Blood Pressure:  Pre 124/67 mm/Hg  Post 138/80 mmHg   EDW  TBD kg    Weight:  Pre 140.1 kg   Post 140.2 kg   Mode of weight measurement: Bed Scale   Volume Removed  0 ml    Treatment duration 180 minutes    NS given  Yes, 100 cc of NSS d/t stomach cramps    Treatment shortened?  No   Medications given during Rx Dilaudid 0.2 mg   Estimated Kt/V  0.64   Access type: Permacath/TDC   Access Issues: No    Report called to primary nurse   Yes, Thania Woodson, RN

## 2023-06-20 NOTE — ASSESSMENT & PLAN NOTE
Patient noted to have coughing with diet this morning.   Due to concern for dysphagia, downgraded to Pureed  Speech consult  VBS tomorrow

## 2023-06-20 NOTE — ASSESSMENT & PLAN NOTE
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 · Warfarin held in the setting of thrombocytopenia  · Patient currently rate controlled, continuing to hold sotalol in the setting of anuria.

## 2023-06-20 NOTE — ASSESSMENT & PLAN NOTE
Admission platelets of 111. Steady downtrend this admission, saranya at 35. One instance of melena documented on 6/16 but at the time patient had platelete count in the 120k-130k. Unclear eitiology but likely 2/2 acute sepsis vs CVVH and filter clotting. Patient did receive heparin intermittently this admission however platelets have not responded to discontinuation of heparin products. Possibly cephalosporin induced, but patient is s/p 4 days of cefepime and then transitioned to ceftriaxone.    · Likely 2/2 sepsis  · No acute signs of bleeding on exam  · S/p 1 U platelets 8/25 in preparation for IR permacath placement  · Trend CBC  · Holding pharmacologic AC/DVT prophylaxis  · uptrending platelets noted

## 2023-06-20 NOTE — ASSESSMENT & PLAN NOTE
Wt Readings from Last 3 Encounters:   06/20/23 (!) 140 kg (309 lb 8.4 oz)   06/07/23 136 kg (299 lb 13.2 oz)   06/02/23 130 kg (285 lb 8 oz)       Patient with a hx of HFpEF70%. · Patient euvolemic after CVVH  · Currently holding home torsemide in the setting of anuria.    · permacath placed for intermittent HD

## 2023-06-20 NOTE — ASSESSMENT & PLAN NOTE
Patient with chronic macrocytic anemia baseline 7-8.    · Did have a hemoglobin of 6.9 and received 1uPRCs for this   · Daily CBCs  · Goal Hgb >7, replete as necessary  · GI following of melena, no acute intervention  · Continue PPI  · Watch for s/s of GI bleed

## 2023-06-21 ENCOUNTER — ANESTHESIA (INPATIENT)
Dept: GASTROENTEROLOGY | Facility: HOSPITAL | Age: 88
DRG: 871 | End: 2023-06-21
Payer: MEDICARE

## 2023-06-21 ENCOUNTER — ANESTHESIA EVENT (INPATIENT)
Dept: GASTROENTEROLOGY | Facility: HOSPITAL | Age: 88
DRG: 871 | End: 2023-06-21
Payer: MEDICARE

## 2023-06-21 PROBLEM — M54.50 ACUTE LOW BACK PAIN: Status: ACTIVE | Noted: 2023-01-01

## 2023-06-21 RX ORDER — PROPOFOL 10 MG/ML
INJECTION, EMULSION INTRAVENOUS AS NEEDED
Status: DISCONTINUED | OUTPATIENT
Start: 2023-06-21 | End: 2023-06-21

## 2023-06-21 RX ORDER — LIDOCAINE HYDROCHLORIDE 20 MG/ML
INJECTION, SOLUTION EPIDURAL; INFILTRATION; INTRACAUDAL; PERINEURAL AS NEEDED
Status: DISCONTINUED | OUTPATIENT
Start: 2023-06-21 | End: 2023-06-21

## 2023-06-21 RX ORDER — EPHEDRINE SULFATE 50 MG/ML
INJECTION INTRAVENOUS AS NEEDED
Status: DISCONTINUED | OUTPATIENT
Start: 2023-06-21 | End: 2023-06-21

## 2023-06-21 RX ORDER — SODIUM CHLORIDE 9 MG/ML
INJECTION, SOLUTION INTRAVENOUS CONTINUOUS PRN
Status: DISCONTINUED | OUTPATIENT
Start: 2023-06-21 | End: 2023-06-21

## 2023-06-21 RX ADMIN — EPHEDRINE SULFATE 5 MG: 50 INJECTION INTRAVENOUS at 13:18

## 2023-06-21 RX ADMIN — EPHEDRINE SULFATE 5 MG: 50 INJECTION INTRAVENOUS at 13:14

## 2023-06-21 RX ADMIN — SODIUM CHLORIDE: 9 INJECTION, SOLUTION INTRAVENOUS at 13:05

## 2023-06-21 RX ADMIN — PROPOFOL 50 MG: 10 INJECTION, EMULSION INTRAVENOUS at 13:12

## 2023-06-21 RX ADMIN — LIDOCAINE HYDROCHLORIDE 100 MG: 20 INJECTION, SOLUTION EPIDURAL; INFILTRATION; INTRACAUDAL; PERINEURAL at 13:12

## 2023-06-21 RX ADMIN — PROPOFOL 30 MG: 10 INJECTION, EMULSION INTRAVENOUS at 13:17

## 2023-06-21 NOTE — ANESTHESIA PREPROCEDURE EVALUATION
Procedure:  EGD    Relevant Problems   CARDIO   (+) Atrial fibrillation (HCC)   (+) CHF (congestive heart failure) (HCC)   (+) Hyperlipidemia      GI/HEPATIC   (+) Dysphagia      /RENAL   (+) REMA (acute kidney injury) (HCC)   (+) BPH (benign prostatic hyperplasia)   (+) CKD (chronic kidney disease) stage 3, GFR 30-59 ml/min (HCC)      HEMATOLOGY   (+) Acute on chronic anemia   (+) Thrombocytopenia (HCC)      PULMONARY   (+) Pleural effusion on right      Endocrine   (+) Diabetes ([address] )      Other   (+) History of ventricular tachycardia   (+) Prolonged QT interval      Admitted 6/14 in septic shock secondary to UTI, course complicated by REMA requiring dialysis  Last dialyzed yesterday  Physical Exam    Airway    Mallampati score: III  TM Distance: >3 FB  Neck ROM: full     Dental   Comment: Removable upper partial  Eroded lower dentition,     Cardiovascular      Pulmonary      Other Findings        6/15/23 TTE:    •  Left Ventricle: Left ventricular cavity size is normal  Wall thickness is normal  There is moderate concentric hypertrophy  There is moderate asymmetric hypertrophy of the septal wall  The left ventricular ejection fraction is 70%  Systolic function is normal  Wall motion is normal  Diastolic function is normal   •  IVS: There is abnormal septal motion  There is diastolic flattening of the interventricular septum consistent with right ventricle volume overload  •  Right Ventricle: Right ventricular cavity size is moderately dilated  Systolic function is normal   •  Left Atrium: The atrium is moderately dilated  •  Mitral Valve: There is mild regurgitation  There is mild stenosis  The mitral valve mean gradient is 7mmHg  The mitral valve area by pressure half time is 3 01cm2  •  Tricuspid Valve: There is moderate regurgitation  The right ventricular systolic pressure is severely elevated  The estimated right ventricular systolic pressure is 85 00 mmHg  •  Aorta:  The aortic root is normal in size  The aortic root exhibited moderate fibrocalcific change        Anesthesia Plan  ASA Score- 4     Anesthesia Type- IV sedation with anesthesia with ASA Monitors  Additional Monitors:   Airway Plan:     Comment: I discussed the risks and benefits of IV sedation anesthesia including the possibility of the need to convert to general anesthesia and the potential risk of awareness  Plan Factors-Exercise tolerance (METS): <4 METS  Chart reviewed  EKG reviewed  Existing labs reviewed  Patient summary reviewed  Patient is not a current smoker  Patient did not smoke on day of surgery  Induction- intravenous  Postoperative Plan-     Informed Consent- Anesthetic plan and risks discussed with patient

## 2023-06-21 NOTE — ASSESSMENT & PLAN NOTE
Wt Readings from Last 3 Encounters:   06/21/23 (!) 141 kg (310 lb 6.4 oz)   06/07/23 136 kg (299 lb 13.2 oz)   06/02/23 130 kg (285 lb 8 oz)       Patient with a hx of HFpEF70%. · Patient euvolemic after CVVH  · Currently holding home torsemide in the setting of anuria.    · permacath placed for intermittent HD

## 2023-06-21 NOTE — RESTORATIVE TECHNICIAN NOTE
Restorative Technician Note      Patient Name: Melvina Nix     Note Type: Mobility  Patient Position Upon Consult: Supine  Activity Performed: Transferred; Dangled; Stood  Assistive Device: Other (Comment) (HHA)  Patient Position at End of Consult: Bedside chair;  All needs within reach

## 2023-06-21 NOTE — ASSESSMENT & PLAN NOTE
2/2 MDR E.coli urosepsis. Briefly required ICU stay and pressor support. Currently off pressors. Abx day 6, s/p 4 days of cefepime, currently day 3 of ceftriaxone. · UCx shows Ecoli and klebsiella both susceptible to ceftriaxone  · Completed ceftriaxone for a total abx duration of 7 days.

## 2023-06-21 NOTE — ASSESSMENT & PLAN NOTE
Patient reported acutely worsening low back pain that is very severe, 10/10, low back across, no radiation. No weakness of lower extremity. Xray obtained: Limited study without a true lateral image. Unchanged severe L4 compression deformity. Backspace Progressive loss of height of the L2 vertebral body may represent an acute fracture. Consider CT follow-up. Will obtain CT L spine scan  Pain control with IV pain meds.    Will need Neurosx consult after CT scan

## 2023-06-21 NOTE — ASSESSMENT & PLAN NOTE
Patient downgraded from critical care, admitted for septic shock secondary to UTI leading to REMA requiring dialysis. · Likely UTI was the initial cause of REMA, exacerbated by concurrent metformin and sotolol use.    · Combs has been removed  · monitoring bladder with q6h bladder scans  · CVVH discontinued 6/19  · tunneled dialysis catheter placed on 6/19, on intermittent hemodialysis  · Nephrology following   · Continue to trend Cr and electrolytes

## 2023-06-21 NOTE — PROGRESS NOTES
Progress Note - Nephrology   Gato Quijano 81 y.o. male MRN: [id number]  Unit/Bed#: [facility] 504-01 Encounter: [id number]      81-year-old male with a past history of CHF, atrial fibrillation, diabetes, who initially presented with fatigue, weakness loss of appetite and was found to have urinary tract infection, lactic acidosis, AGMA and potassium greater than 7 associated bradycardia and wide QRS complexes requiring emergent IHD 6/14/2023  and eventually transitioning to CRRT in which nephrology consulted and managing.      ASSESSMENT and PLAN:  Oligo anuric acute kidney injury  • Complicated by severe hyperkalemia and metabolic acidosis -resolved  Etiology: Suspect septic ATN, possible diuretic use contrast nephropathy  · Baseline creatinine appears to be 1.6-2.0 back to 5/2022, previously 1.3-1.6  • Initiated on emergent IHD on 6/14/2023 for hyperkalemia  • Transition to CRRT-discontinued 6/19/2023  • Remains anuric  • Bladder scan remains 0 ml  • Current creatinine 1.61  • Electrolytes and acid-base balance within normal limits  • S/P HD 6/20/2021 UF even,  • Keep SBP greater than 120  • Continue to monitor for renal recovery  • Check BMP daily  • Plan hemodialysis tomorrow  • Monitor daily for dialysis needs  • Urgent need for dialysis today     Access:  • Tunneled catheter placed via IR 6/19/2023-site intact, clean and dry     Chronic kidney disease IIIB:    Etiology:  Suspect secondary to diabetic kidney disease, pretension nephrosclerosis, prior REMA episode as well as age-related nephron loss  • Baseline Creatinine of 1.6-2.0 dating back to 2022 the setting of prior REMA/ATN/LORENA  • Previous baseline 1.3-1.6  • Previously seen with prior admission but no renal follow-up  • Recommend follow-up on discharge     Shock: Suspect mixed septic shock, acidosis, hypovolemia-resolved  • Previously required inotropic and pressor support-currently off  • Management per primary team     Severe hyperkalemia-in the setting of severe REMA-resolved  • Required IHD and transition to CRRT-now off 6/19/2023  • Continue to monitor electrolytes daily  • Current potassium 3.9     Hypophosphatemia-improved  • Previous required phosphorus replacement  • Current 2.3  • Continue to monitor and treat as needed     Severe acidosis/lactic acidosis-resolved  • Concern for metformin toxicity  • Continue to monitor for now     Anemia: Likely in the setting of acute blood loss possible GI bleed  • Management per primary team  • Hemoglobin 8.2  • s/p post transfusion on 6/17/2023     Chronic lower extremity wounds and lymphedema  • Monitor with ongoing dialysis     Medical records through [address] [place] [address],[facility] [address] and care everywhere has been reviewed for this encounter    Review of systems  Patient seen and examined at bedside:  Review of Systems   Constitutional: Negative. Negative for activity change, appetite change, chills, diaphoresis, fatigue and fever. HENT: Negative. Negative for congestion and facial swelling. Respiratory: Negative. Cardiovascular: Negative. Gastrointestinal: Negative. Endocrine: Negative. Genitourinary: Positive for decreased urine volume. Musculoskeletal: Negative. Skin: Negative. Allergic/Immunologic: Negative. Neurological: Negative. Hematological: Negative. Psychiatric/Behavioral: Negative. Physical exam:  Current Weight: Weight - Scale: (!) 141 kg (310 lb 6.4 oz)  Vitals:    06/20/23 2243 06/21/23 0228 06/21/23 0504 06/21/23 0720   BP: 134/72 145/80  147/80   BP Location:       Pulse: 87 94  90   Resp:    16   Temp: (!) 97.3 °F (36.3 °C)   97.9 °F (36.6 °C)   TempSrc:       SpO2: 97% 98%  99%   Weight:   (!) 141 kg (310 lb 6.4 oz)    Height:           Intake/Output Summary (Last 24 hours) at 6/21/2023 1003  Last data filed at 6/20/2023 2250  Gross per 24 hour   Intake 1190 ml   Output 1063 ml   Net 127 ml       Physical Exam  Vitals and nursing note reviewed.    Constitutional:       Appearance: He is obese. He is ill-appearing. Comments: nad   HENT:      Head: Normocephalic and atraumatic. Nose: Nose normal.      Mouth/Throat:      Mouth: Mucous membranes are dry. Pharynx: Oropharynx is clear. Eyes:      Extraocular Movements: Extraocular movements intact. Conjunctiva/sclera: Conjunctivae normal.   Neck:      Comments: Right chest Perm cath-site intact  Cardiovascular:      Rate and Rhythm: Normal rate and regular rhythm. Pulses: Normal pulses. Heart sounds: Normal heart sounds. Pulmonary:      Effort: Pulmonary effort is normal.      Breath sounds: Normal breath sounds. Abdominal:      General: Bowel sounds are normal.      Palpations: Abdomen is soft. Musculoskeletal:      Cervical back: Normal range of motion and neck supple. Left lower leg: Edema present. Comments: Appears chronic/lymphedema   Skin:     General: Skin is warm and dry. Neurological:      General: No focal deficit present. Mental Status: He is alert and oriented to person, place, and time.    Psychiatric:      Comments: Flat affect              Medications:    Current Facility-Administered Medications:   •  acetaminophen (TYLENOL) tablet 650 mg, 650 mg, Oral, Q6H PRN, Remy Bautista MD, 650 mg at 06/18/23 0846  •  cholecalciferol (VITAMIN D) oral liquid 1,000 Units, 1,000 Units, Oral, Daily, Remy Bautista MD, 1,000 Units at 06/20/23 2593  •  diphenhydramine, lidocaine, Al/Mg hydroxide, simethicone (Magic Mouthwash) oral solution 10 mL, 10 mL, Swish & Spit, Q6H PRN, Remy Bautista MD  •  HYDROmorphone HCl (DILAUDID) injection 0.2 mg, 0.2 mg, Intravenous, Q3H PRN, Remy Bautista MD, 0.2 mg at 06/21/23 0505  •  insulin lispro (HumaLOG) 100 units/mL subcutaneous injection 2-12 Units, 2-12 Units, Subcutaneous, 4x Daily (with meals and at bedtime), 2 Units at 06/18/23 7725 **AND** Fingerstick Glucose (POCT), , , 4x Daily AC and at bedtime, Remy Bautista MD  •  lidocaine (LIDODERM) 5 % patch 1 patch, 1 patch, Topical, Daily, Anjelica Bautista MD, 1 patch at 06/21/23 0406  •  lidocaine (LMX) 4 % cream, , Topical, 4x Daily PRN, Anjelica Bautista MD, Given at 06/18/23 1640  •  pantoprazole (PROTONIX) injection 40 mg, 40 mg, Intravenous, Q12H [facility], Anjelica Bautista MD, 40 mg at 06/21/23 8114  •  polyethylene glycol (MIRALAX) packet 17 g, 17 g, Oral, Daily, Anjelica Bautista MD  •  [facility]) tablet 17.2 mg, 2 tablet, Oral, HS, Anjelica Bautista MD, 17.2 mg at 06/19/23 2206  •  trimethobenzamide (TIGAN) IM injection 200 mg, 200 mg, Intramuscular, Q6H PRN, Anjelica Bautista MD, 200 mg at 06/18/23 1640    Facility-Administered Medications Ordered in Other Encounters:   •  sodium chloride 0.9 % infusion, 75 mL/hr, Intravenous, Continuous, Mello Fuentes MD, Stopped at 05/26/23 1426    Laboratory Results:  Results from last 7 days   Lab Units 06/21/23  0517 06/20/23  0455 06/19/23  1446 06/19/23  0550 06/19/23  0001 06/18/23  1806 06/18/23  1240 06/18/23  0617 06/18/23  0011 06/17/23  1822 06/17/23  1217 06/17/23  0535 06/16/23  1206 06/16/23  0550 06/15/23  2010 06/15/23  1949 06/15/23  0032 06/14/23  2249   WBC Thousand/uL 11.47* 10.96*  --  12.64*  --   --   --  16.47*  --   --  11.98* 10.68*  --  13.25*   < >  --    < >  --    HEMOGLOBIN g/dL 8.2* 7.0*  --  7.3*  --  7.6*  --  7.6* 7.3*  --  7.9* 6.9*   < > 8.2*   < >  --    < >  --    I STAT HEMOGLOBIN g/dl  --   --   --   --   --   --   --   --   --   --   --   --   --   --   --  8.5*  --  8.8*   HEMATOCRIT % 26.1* 22.9*  --  24.6*  --  24.1*  --  24.3* 23.1*  --  24.9* 22.4*   < > 25.7*   < >  --    < >  --    HEMATOCRIT, ISTAT %  --   --   --   --   --   --   --   --   --   --   --   --   --   --   --  25*  --  26*   PLATELETS Thousands/uL 69* 67* 63* 35*  --   --   --  61*  --   --  78* 88*  --  123*   < >  --    < >  --    SODIUM mmol/L 138 138  --  138 139 138 137 136 136   < > 135 137   < > 133*   < >  --    < >  -- POTASSIUM mmol/L 3.9 3.9  --  4.0 4.2 3.8 4.0 4.0 4.1   < > 4.2 4.2   < > 5.1   < >  --    < >  --    CHLORIDE mmol/L 106 108  --  108 109* 107 109* 106 107   < > 105 105   < > 101   < >  --    < >  --    CO2 mmol/L 27 26  --  27 28 28 28 27 29   < > 28 28   < > 20*   < >  --    < >  --    CO2, I-STAT mmol/L  --   --   --   --   --   --   --   --   --   --   --   --   --   --   --  23  --  12*   BUN mg/dL 14 18  --  12 12 13 12 14 15   < > 17 20   < > 33*   < >  --    < >  --    CREATININE mg/dL 1.61* 1.83*  --  1.05 1.18 1.31* 1.34* 1.36* 1.45*   < > 1.74* 1.99*   < > 3.35*   < >  --    < >  --    CALCIUM mg/dL 8.1* 8.7  --  8.8 8.8 8.8 8.7 9.0 8.6   < > 9.0 8.8   < > 9.1   < >  --    < >  --    MAGNESIUM mg/dL 1.8 1.9  --  2.0 1.9 1.8 2.0 2.2 1.9   < > 1.9 2.0   < > 2.4   < >  --    < >  --    PHOSPHORUS mg/dL 2.3 2.7  --  1.8* 1.7* 1.8* 1.8* 2.3 2.2*   < > 2.4 2.3   < > 3.7   < >  --    < >  --    GLUCOSE, ISTAT mg/dl  --   --   --   --   --   --   --   --   --   --   --   --   --   --   --  194*  --  91    < > = values in this interval not displayed. Portions of the record may have been created with voice recognition software. Occasional wrong word or "sound a like" substitutions may have occurred due to the inherent limitations of voice recognition software.  Read the chart carefully and recognize,

## 2023-06-21 NOTE — ANESTHESIA POSTPROCEDURE EVALUATION
Post-Op Assessment Note    CV Status:  Stable  Pain Score: 0    Pain management: adequate     Mental Status:  Arousable and sleepy   Hydration Status:  Euvolemic   PONV Controlled:  Controlled   Airway Patency:  Patent      Post Op Vitals Reviewed: Yes      Staff: Anesthesiologist, CRNA         There were no known notable events for this encounter      BP   119/59   Temp   97 4   Pulse   81   Resp   14   SpO2   100% 2LNC from the floor

## 2023-06-21 NOTE — SPEECH THERAPY NOTE
Speech Pathology - Modified Barium Swallow Study    Patient Name: Oliver Mcdonald    Today's Date: 6/21/2023     Problem List  Principal Problem:    REMA (acute kidney injury) ([address])  Active Problems:    Diabetes ([address])    Acute on chronic anemia    Atrial fibrillation (HCC)    History of ventricular tachycardia    CHF (congestive heart failure) (HCC)    Prolonged QT interval    Thrombocytopenia (HCC)    Septic shock ([address])    Dysphagia    Acute low back pain      Past Medical History  Past Medical History:   Diagnosis Date   • Anemia    • Arthritis    • Atrial fibrillation ([address])    • Basal cell carcinoma 03/22/2022    Tip of Nose   • CHF (congestive heart failure) (HCC)    • Diabetes mellitus ([address])     Niddm   • DVT (deep vein thrombosis) in pregnancy 1966    not in pregnancy   • DVT (deep venous thrombosis) ([address]) 1966   • Dyslipidemia    • Encephalopathy acute 11/4/2022   • GERD (gastroesophageal reflux disease)    • Hyperlipidemia    • Hypertension    • Hypomagnesemia 10/19/2022   • Irregular heart beat     Afib   • Morbid obesity due to excess calories ([address])     Resolved 9/2/2014    • Pulmonary embolism (HCC)    • Sepsis ([address])    • Squamous cell skin cancer 07/30/2020    Left posterior scalp   • Visual impairment        Past Surgical History  Past Surgical History:   Procedure Laterality Date   • AORTIC VALVE REPLACEMENT     • CARDIAC DEFIBRILLATOR PLACEMENT  04/2014   • CARDIAC SURGERY  02/2014    AVR   • COLONOSCOPY     • INSERT / REPLACE / REMOVE PACEMAKER     • IR ASPIRATION BAKERS CYST  3/8/2023   • IR CHOLECYSTOSTOMY TUBE CHECK/CHANGE/REPOSITION/REINSERTION/UPSIZE  10/13/2022   • IR CHOLECYSTOSTOMY TUBE CHECK/CHANGE/REPOSITION/REINSERTION/UPSIZE  10/19/2022   • IR CHOLECYSTOSTOMY TUBE CHECK/CHANGE/REPOSITION/REINSERTION/UPSIZE  10/27/2022   • IR CHOLECYSTOSTOMY TUBE CHECK/CHANGE/REPOSITION/REINSERTION/UPSIZE  11/7/2022   • IR CHOLECYSTOSTOMY TUBE CHECK/CHANGE/REPOSITION/REINSERTION/UPSIZE  11/10/2022   • IR CHOLECYSTOSTOMY TUBE CHECK/CHANGE/REPOSITION/REINSERTION/UPSIZE  12/1/2022   • IR CHOLECYSTOSTOMY TUBE CHECK/CHANGE/REPOSITION/REINSERTION/UPSIZE  1/6/2023   • IR CHOLECYSTOSTOMY TUBE CHECK/CHANGE/REPOSITION/REINSERTION/UPSIZE  1/24/2023   • IR CHOLECYSTOSTOMY TUBE CHECK/CHANGE/REPOSITION/REINSERTION/UPSIZE  3/15/2023   • IR CHOLECYSTOSTOMY TUBE PLACEMENT  9/1/2022   • IR DRAINAGE TUBE CHECK AND/OR REMOVAL  3/22/2023   • IR DRAINAGE TUBE CHECK AND/OR REMOVAL  4/10/2023   • IR DRAINAGE TUBE PLACEMENT  4/6/2023   • IR TUNNELED DIALYSIS CATHETER PLACEMENT  6/19/2023   • JOINT REPLACEMENT Left     LTKR   • MOHS SURGERY  07/30/2020    Left posterior scalp, Dr. Pranav Bryant   • MOHS SURGERY  04/18/2022    BCC Tip of Nose- Dr. Pranav Bryant   • Leretha Christian DIVJ&STRIP LONG SAPH SAPHFEM Cathi Idol Right 8/17/2018    Procedure: LEG PERFORATED INJECTION SCLEROTHERAPY;  Surgeon: Darlin Henry MD;  Location: [facility]  MAIN [facility];  Service: Vascular   • REPLACEMENT TOTAL KNEE Right    • TOTAL KNEE ARTHROPLASTY Left    • VASCULAR SURGERY     • VENA CAVA FILTER PLACEMENT      Interruption inferior vena cava, Josue filter, placement   • WISDOM TOOTH EXTRACTION         Assessment Summary:    Pt presents with a moderate oral and a severe pharyngeal dysphagia. Dysphagia characterized by decreased oral control of bolus with prolonged transfer time with all. Oral control is reduced, with spillage over BOT to the valleculae with all consistencies. Epiglottic inversion is incomplete and pharyngeal constriction is reduced. The patient presents with a mod-significant amount of valleculae retention and intermittently throat clears and regurgitates material into oral cavity. Laryngeal vestibule closure is reduced and the patient is observed with aspiration events across all consistencies. The patient does exhibit a strong cough response. BOT retraction is reduced. Brief scan of the esophagus reveals stasis and possible reflux.    Note: Images are available for review in PACS as desired. Recommendations:   Recommended Diet: NPO. May need to consider alternate means of nutrition  Recommended Form of Medications: non-oral if possible   Aspiration precautions and compensatory swallowing strategies: upright posture, slow rate of feeding and small bites/sips  Consider referral to:  GI   SLP Dysphagia therapy recommended: yes    Results Reviewed with: patient, MD and family   Pt/Family Education: initiated. Pt and caregivers would benefit from/require continued education. Reviewed results of VBS, reason for NPO status and possible need for feeding tube    Patient Stated Goal: "I'll do what I have to do"     Dysphagia Goals per SLP: pt will tolerate ice chips without s/s of aspiration     General Information;  HX and PE limited by: none  Pilar Diego is a 81 y.o. male who presents to ED reporting 2 days of generalized weakness, fatigue, loss of appetite, and feeling generally unwell. Denies chest pain, dyspnea, isolated neuro deficits, or recent change in medications.    Workup in ED significant for UTI, AGMA, hypoglycemia, lactic acidosis, and potassium > 7. Assessed by nephrology in ED. On arrival to ICU patient was alert and oriented but becoming increasingly dysarthric. Patient became bradycardic with widening QRS complexes on telemetry. Patient was treated w/ temporizing measures for hyperkalemia and put on HD.     History obtained from spouse, patient, chart review    Current concerns for dysphagia include coughing at bedside with trials and reports of dysphagia at home prior to admission. MBS was recommended to assess oropharyngeal stage swallowing skills at this time.      Prior MBS: none    Patient had EGD this morning:   FINDINGS:  • Non-obstructing Schatzki ring in the GE junction  • 2 cm hiatal hernia without Giovany lesions present - GE junction 43 cm from the incisors, diaphragmatic impression 45 cm from the incisors  • Mild erythematous mucosa with erosion in the fundus of the stomach and body of the stomach, consistent with gastritis; performed cold forceps biopsy to rule out H. pylori  • Erythematous mucosa in the duodenal bulb and 1st part of the duodenum  • Single small, superficial ulcer in the 1st part of the duodenum with clean base (Chandana III)    Oral Mechanism Exam  Facial: symmetrical  Labial: WFL  Lingual: WFL  Velum: symmetrical  Mandible: adequate ROM  Dentition: partial dentures  Vocal quality: clear/adequate  Respiratory Status: on RA      Pt was viewed sitting upright in the lateral and AP positions. Due to concerns for patient safety / patient refusal, trials provided deviated from the MBSImP Validated Protocol. Pt was given 5-mL thin liquid x2, 5-mL nectar thick, 20-mL cup sip nectar thick, 5-mL honey thick and 5-mL pudding. Initial view observations/comments: Clear view of the upper airway       8-Point Penetration-Aspiration Scale   Thin liquid 7 - Material enters the airway, passes below the vocal folds, and is not ejected from the trachea despite effort     Nectar thick liquid 7 - Material enters the airway, passes below the vocal folds, and is not ejected from the trachea despite effort     Honey thick liquid 7 - Material enters the airway, passes below the vocal folds, and is not ejected from the trachea despite effort     Puree (pudding) 1 - Material does not enter the airway   Solid N/A       MBS IMP Rating    ORAL Impairment  Compinent 1--Lip Closure  Judged at any point during the swallow. 0 - No labial escape    Component 2--Tongue Control During Bolus Hold  Judged on held liquid boluses only and prior to productive tongue movement. 1 - Escape to lateral buccal cavity/floor of mouth (FOM)    Component 3--Bolus Preparation/Mastication  Judged only during presentation of 1/2 shortbread cookie coated in pudding.    N/A    Component 4--Bolus Transport/Lingual Motion  Judged after first productive tongue movement for oral bolus transport. 0 - Brisk tongue motion    Component 5--Oral Residue  Judged after first swallow or after the last swallow of the sequential swallow task. 1 - Trace residue lining oral structures   Location   B - Palate and C - Tongue    Component 6--Initiation of Pharyngeal Swallow  Judged at first movement of the brisk superior-anterior hyoid trajectory. 1 - Bolus Head in valleculae      PHARYNGEAL Impairment  Component 7--Soft Palate Elevation  Judged during maximum displacement of soft palate. 1 - Trace column of contrast or air between the SP and PW    Component 8--Laryngeal Elevation  Judged when epiglottis is in its most horizontal position. 0 - Complete superior movement of thyroid cartilage with complete approximation of arytenoids to epiglottic petiole    Component 9--Anterior Hyoid Excursion  Judged at height of swallow/maximal anterior hyoid displacement. 0 - Complete anterior movement    Component 10--Epiglottic Movement  Judged at height of swallow/maximal anterior hyoid displacement. 1 - Partial inversion    Component 11--Laryngeal Vestibular Closure  Judged at height of swallow/maximal anterior hyoid displacement. 1 - Incomplete; narrow column air/contrast in laryngeal vestibule    Component 12--Pharyngeal Stripping Wave  Judged during the full duration of the pharyngeal swallow. 1 - Present - diminished    Component 13--Pharyngeal Contraction  Judged in AP view at rest and throughout maximum movement of structures. N/A    Component 14--Pharyngoesophageal Segment Opening  Judged during maximum distension of PES and throughout opening and closure. 1 - Partial distension/partial duration; partial obstruction to flow    Component 15--Tongue Base (TB) Retraction  Judged during maximum retraction of the tongue base. 1 - Trace column of contrast or air between TB and PW    Component 16--Pharyngeal Residue  Judged after first swallow or after the last swallow of the sequential swallow task.   1 - Trace residue within or on pharyngeal structures   Location   A - Tongue Base, B - Valleculae and C - Pharyngeal wall      ESOPHAGEAL Impairment  Component 17--Esophageal Clearance Upright Position  Judged in AP view during bolus transit through the oral cavity to the LES  N/A

## 2023-06-21 NOTE — CASE MANAGEMENT
Case Management Note    Patient name Mckinley Crigler  Location [address] [place] [address]/[facility] [id number] MRN [id number]  : 1935 Date 2023       Current Admission Date: 2023  Current Admission Diagnosis:REMA (acute kidney injury) ([address])      LOS (days): 7  Geometric Mean LOS (GMLOS) (days): 5.00  Days to GMLOS:-1.6     OBJECTIVE:  Risk of Unplanned Readmission Score: 38.59   Current admission status: Inpatient   Primary Insurance: MEDICARE  Secondary Insurance: [employer]    DISCHARGE DETAILS:    Discharge planning discussed with[de-identified] Patient  Freedom of Choice: Yes  Were Treatment Team discharge recommendations reviewed with patient/caregiver?: Yes  Did patient/caregiver verbalize understanding of patient care needs?: Yes  Were patient/caregiver advised of the risks associated with not following Treatment Team discharge recommendations?: Yes    Treatment Team Recommendation: Short Term Rehab    Additional Comments: Pt is now recommended for short-term skilled rehab placement for his aftercare plan. CM spoke to pt about this new aftercare recommendation. Pt is agreeable w/ this new recommendation. CM provided pt w/ freedom of choice for SNF referrals. Pt granted permission to CM to perform a provider search by sending referrals to local SNFs that will meet the pt's rehab needs. CM made AIDIN referrals to [facility]. CM to follow.

## 2023-06-21 NOTE — PHYSICAL THERAPY NOTE
PHYSICAL THERAPY NOTE          Patient Name: Kathi Cuevas  NMEDR'P Date: 6/21/2023 06/21/23 1356   PT Last Visit   PT Visit Date 06/21/23   Note Type   Note Type Cancelled Session   Cancel Reasons Patient off floor/test  (PT will continue to follow as able.)     Miriam De La Torre, PT, DPT

## 2023-06-21 NOTE — ASSESSMENT & PLAN NOTE
Lab Results   Component Value Date    HGBA1C 5.0 06/15/2023       Recent Labs     06/20/23  1647 06/20/23  2100 06/21/23  0604 06/21/23  1041   POCGLU 118 120 123 123       Blood Sugar Average: Last 72 hrs:  (P) 138   Lab Results   Component Value Date    HGBA1C 5.0 06/15/2023       Recent Labs     06/20/23  1647 06/20/23  2100 06/21/23  0604 06/21/23  1041   POCGLU 118 120 123 123       Blood Sugar Average: Last 72 hrs:  (P) 138     Plan:  · Home regimen of metformin held in the setting of REMA and metabolic acidosis.    · Continue with SSI QID

## 2023-06-21 NOTE — RESTORATIVE TECHNICIAN NOTE
Restorative Technician Note      Patient Name: Manju Shearer     Note Type: Mobility  Patient Position Upon Consult: Bedside chair  Activity Performed: Dangled; Stood; Transferred  Assistive Device: Other (Comment) (x2)  Patient Position at End of Consult:  Other (comment) (Myke Wolfe)

## 2023-06-21 NOTE — ASSESSMENT & PLAN NOTE
Admission platelets of 760. Steady downtrend this admission, saranya at 35. One instance of melena documented on 6/16 but at the time patient had platelete count in the 120k-130k. Unclear eitiology but likely 2/2 acute sepsis vs CVVH and filter clotting. Patient did receive heparin intermittently this admission however platelets have not responded to discontinuation of heparin products. Possibly cephalosporin induced, but patient is s/p 4 days of cefepime and then transitioned to ceftriaxone.    · Likely 2/2 sepsis  · No acute signs of bleeding on exam  · S/p 1 U platelets 1/94 in preparation for IR permacath placement  · Trend CBC  · Holding pharmacologic AC/DVT prophylaxis  · uptrending platelets noted

## 2023-06-21 NOTE — PROGRESS NOTES
[address] [facility]  Progress Note  Name: Anoop Mora  MRN: [id number]  [facility] I Date of Admission: 6/14/2023   Date of Service: 6/21/2023 I Hospital Day: 7    Assessment/Plan   * REMA (acute kidney injury) [facility])  Assessment & Plan  Patient downgraded from critical care, admitted for septic shock secondary to UTI leading to REMA requiring dialysis. · Likely UTI was the initial cause of REMA, exacerbated by concurrent metformin and sotolol use. · Combs has been removed  · monitoring bladder with q6h bladder scans  · CVVH discontinued 6/19  · tunneled dialysis catheter placed on 6/19, on intermittent hemodialysis  · Nephrology following   · Continue to trend Cr and electrolytes    Acute low back pain  Assessment & Plan  Patient reported acutely worsening low back pain that is very severe, 10/10, low back across, no radiation. No weakness of lower extremity. Xray obtained: Limited study without a true lateral image. Unchanged severe L4 compression deformity. Backspace Progressive loss of height of the L2 vertebral body may represent an acute fracture. Consider CT follow-up. Will obtain CT L spine scan  Pain control with IV pain meds. Will need Neurosx consult after CT scan    Dysphagia  Assessment & Plan  Patient had VBS with speech today. Unfortunately he is aspiration with both solid and liquid. Will change to NPO, will change PO meds to IV and other routes. Pending additional recs from speech. If not improving, will need PEG    Septic shock ([address])  Assessment & Plan  2/2 MDR E.coli urosepsis. Briefly required ICU stay and pressor support. Currently off pressors. Abx day 6, s/p 4 days of cefepime, currently day 3 of ceftriaxone. · UCx shows Ecoli and klebsiella both susceptible to ceftriaxone  · Completed ceftriaxone for a total abx duration of 7 days. Thrombocytopenia ([address])  Assessment & Plan  Admission platelets of 061.  Steady downtrend this admission, saranya at 35. One instance of melena documented on 6/16 but at the time patient had platelete count in the 120k-130k. Unclear eitiology but likely 2/2 acute sepsis vs CVVH and filter clotting. Patient did receive heparin intermittently this admission however platelets have not responded to discontinuation of heparin products. Possibly cephalosporin induced, but patient is s/p 4 days of cefepime and then transitioned to ceftriaxone. · Likely 2/2 sepsis  · No acute signs of bleeding on exam  · S/p 1 U platelets 0/36 in preparation for IR permacath placement  · Trend CBC  · Holding pharmacologic AC/DVT prophylaxis  · uptrending platelets noted      Prolonged QT interval  Assessment & Plan  Noted on EKG. Avoid QTC prolonging medications. CHF (congestive heart failure) ([facility])  Assessment & Plan  Wt Readings from Last 3 Encounters:   06/21/23 (!) 141 kg (310 lb 6.4 oz)   06/07/23 136 kg (299 lb 13.2 oz)   06/02/23 130 kg (285 lb 8 oz)       Patient with a hx of HFpEF70%. · Patient euvolemic after CVVH  · Currently holding home torsemide in the setting of anuria. · permacath placed for intermittent HD      History of ventricular tachycardia  Assessment & Plan  S/p DUAL CHAMBER ICD/ NOT MRI CONDITIONAL . 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 · Warfarin held in the setting of thrombocytopenia  · Patient currently rate controlled, continuing to hold sotalol in the setting of anuria. Acute on chronic anemia  Assessment & Plan  Patient with chronic macrocytic anemia baseline 7-8. · Did have a hemoglobin of 6.9 and received 1uPRCs for this   · hgb 7 yesterdya.  transfuse 1 unit PRBC with HD  · Daily CBCs  · Goal Hgb >7, replete as necessary  · GI following of melena  · EGD completed today: no ulcers, only gastritis/duodenitis  · Continue PPI  · Watch for s/s of GI bleed    Diabetes ([address])  Assessment & Plan  Lab Results   Component Value Date    HGBA1C 5.0 06/15/2023       Recent Labs     23  1647 23  2100 23  0604 23  1041   POCGLU 118 120 123 123       Blood Sugar Average: Last 72 hrs:  (P) 138   Lab Results   Component Value Date    HGBA1C 5.0 06/15/2023       Recent Labs     23  1647 23  2100 23  0604 23  1041   POCGLU 118 120 123 123       Blood Sugar Average: Last 72 hrs:  (P) 138     Plan:  · Home regimen of metformin held in the setting of REMA and metabolic acidosis. · Continue with SSI QID                   VTE Pharmacologic Prophylaxis:   Pharmacologic: Pharmacologic VTE Prophylaxis contraindicated due to thrombocytopenia  Mechanical VTE Prophylaxis in Place: Yes    Patient Centered Rounds: I have performed bedside rounds with nursing staff today. Education and Discussions with Family / Patient: wife and daughter present at bedside         Current Length of Stay: 7 day(s)    Current Patient Status: Inpatient   Certification Statement: The patient will continue to require additional inpatient hospital stay due to pending workup    Discharge Plan: likely another 72 hours    Code Status: Level 1 - Full Code      Subjective: This morning patient complains of significantly worsening low back pain across, 10 out of 10. Patient denies radiation to the leg, weakness of the lower extremities. Patient has history of chronic low back pain which has significantly worsened as of this morning. Objective:     Vitals:   Temp (24hrs), Av.7 °F (36.5 °C), Min:97.3 °F (36.3 °C), Max:98.4 °F (36.9 °C)    Temp:  [97.3 °F (36.3 °C)-98.4 °F (36.9 °C)] 97.4 °F (36.3 °C)  HR:  [81-94] 89  Resp:  [16-18] 16  BP: (109-147)/(55-80) 109/65  SpO2:  [97 %-100 %] 100 %  Body mass index is 40.95 kg/m². Input and Output Summary (last 24 hours):        Intake/Output Summary (Last 24 hours) at 2023 1539  Last data filed at 2023 1322  Gross per 24 hour   Intake 450 ml   Output 1188 ml   Net -738 ml       Physical Exam:     Physical Exam  Vitals and nursing note reviewed. Constitutional:       General: He is not in acute distress. Appearance: He is well-developed. He is ill-appearing. HENT:      Head: Normocephalic and atraumatic. Cardiovascular:      Rate and Rhythm: Normal rate. Rhythm irregular. Heart sounds: No murmur heard. Pulmonary:      Effort: Pulmonary effort is normal. No respiratory distress. Breath sounds: Normal breath sounds. Abdominal:      Palpations: Abdomen is soft. Tenderness: There is no abdominal tenderness. Musculoskeletal:         General: No swelling. Comments: Noted point tenderness over lumbar spine and bilateral lower hip   Skin:     General: Skin is warm and dry. Capillary Refill: Capillary refill takes less than 2 seconds. Neurological:      Mental Status: He is alert. Additional Data:     Labs:    Results from last 7 days   Lab Units 06/21/23  0517 06/20/23 0455 06/16/23  0550 06/16/23  0103   WBC Thousand/uL 11.47* 10.96*   < > 14.99*   HEMOGLOBIN g/dL 8.2* 7.0*   < > 7.9*   HEMATOCRIT % 26.1* 22.9*   < > 25.4*   PLATELETS Thousands/uL 69* 67*   < > 138*   BANDS PCT %  --   --   --  15*   NEUTROS PCT %  --  74   < >  --    LYMPHS PCT %  --  13*   < >  --    LYMPHO PCT %  --   --   --  1*   MONOS PCT %  --  9   < >  --    MONO PCT %  --   --   --  0*   EOS PCT %  --  2   < > 0    < > = values in this interval not displayed.      Results from last 7 days   Lab Units 06/21/23  0517 06/20/23  0455 06/19/23  0550   SODIUM mmol/L 138   < > 138   POTASSIUM mmol/L 3.9   < > 4.0   CHLORIDE mmol/L 106   < > 108   CO2 mmol/L 27   < > 27   BUN mg/dL 14   < > 12   CREATININE mg/dL 1.61*   < > 1.05   ANION GAP mmol/L 5   < > 3*   CALCIUM mg/dL 8.1*   < > 8.8   ALBUMIN g/dL  --   --  2.6*   TOTAL BILIRUBIN mg/dL  --   --  1.45*   ALK PHOS U/L  --   --  79   ALT U/L  --   --  23   AST U/L  --   --  39 GLUCOSE RANDOM mg/dL 115   < > 112    < > = values in this interval not displayed. Results from last 7 days   Lab Units 06/21/23  0517   INR  1.66*     Results from last 7 days   Lab Units 06/21/23  1041 06/21/23  0604 06/20/23  2100 06/20/23  1647 06/20/23  1606 06/20/23  1023 06/20/23  0623 06/19/23  2206 06/19/23  1705 06/19/23  1143 06/19/23  0742 06/18/23  2044   POC GLUCOSE mg/dl 123 123 120 118 147* 140 110 144* 121 124 116 159*     Results from last 7 days   Lab Units 06/15/23  0526   HEMOGLOBIN A1C % 5.0     Results from last 7 days   Lab Units 06/17/23  1217 06/17/23  0535 06/17/23  0013 06/16/23  2033 06/14/23  1926 06/14/23  1851   LACTIC ACID mmol/L 1.6 1.8 2.5* 3.6*   < >  --    PROCALCITONIN ng/ml  --   --   --   --   --  0.25    < > = values in this interval not displayed. Recent Cultures (last 7 days):     Results from last 7 days   Lab Units 06/14/23  1905 06/14/23  1900 06/14/23  1851   BLOOD CULTURE   --  No Growth After 5 Days. No Growth After 5 Days.    URINE CULTURE  50,000-59,000 cfu/ml Escherichia coli*  10,000-19,000 cfu/ml Klebsiella oxytoca*  <10,000 cfu/ml Alpha Hemolytic Streptococcus NOT Enterococcus*  --   --        Last 24 Hours Medication List:   Current Facility-Administered Medications   Medication Dose Route Frequency Provider Last Rate   • [START ON 6/22/2023] bisacodyl  10 mg Rectal Daily Emeterio Cifuentes MD     • diphenhydramine, lidocaine, Al/Mg hydroxide, simethicone  10 mL Swish & Spit Q6H PRN Tl Bautista MD     • HYDROmorphone  0.5 mg Intravenous Q4H PRN Shayne Driver MD     • HYDROmorphone  0.2 mg Intravenous Q4H PRN Shayne Driver MD     • insulin lispro  2-12 Units Subcutaneous 4x Daily (with meals and at bedtime) Tl Bautista MD     • lidocaine  1 patch Topical Daily Tl Bautista MD     • lidocaine   Topical 4x Daily PRN Tl Bautista MD     • [START ON 6/22/2023] pantoprazole  40 mg Intravenous Q24H Armida Cavanaugh MD     • trimethobenzamide  200 mg Intramuscular Q6H PRN Francisca Bautista MD       Facility-Administered Medications Ordered in Other Encounters   Medication Dose Route Frequency Provider Last Rate   • sodium chloride  75 mL/hr Intravenous Continuous Cristiane Pearson MD Stopped (05/26/23 0733)        Today, Patient Was Seen By: Sally Campos MD    ** Please Note: Dictation voice to text software may have been used in the creation of this document.  **

## 2023-06-21 NOTE — ASSESSMENT & PLAN NOTE
Patient had VBS with speech today. Unfortunately he is aspiration with both solid and liquid. Will change to NPO, will change PO meds to IV and other routes. Pending additional recs from speech.    If not improving, will need PEG

## 2023-06-21 NOTE — PLAN OF CARE
Problem: METABOLIC, FLUID AND ELECTROLYTES - ADULT  Goal: Electrolytes maintained within normal limits  Description: INTERVENTIONS:  - Monitor labs and assess patient for signs and symptoms of electrolyte imbalances  - Administer electrolyte replacement as ordered  - Monitor response to electrolyte replacements, including repeat lab results as appropriate  - Instruct patient on fluid and nutrition as appropriate  Outcome: Progressing  Goal: Fluid balance maintained  Description: INTERVENTIONS:  - Monitor labs   - Monitor I/O and WT  - Instruct patient on fluid and nutrition as appropriate  - Assess for signs & symptoms of volume excess or deficit  Outcome: Progressing  Goal: Glucose maintained within target range  Description: INTERVENTIONS:  - Monitor Blood Glucose as ordered  - Assess for signs and symptoms of hyperglycemia and hypoglycemia  - Administer ordered medications to maintain glucose within target range  - Assess nutritional intake and initiate nutrition service referral as needed  Outcome: Progressing     Problem: MOBILITY - ADULT  Goal: Maintain or return to baseline ADL function  Description: INTERVENTIONS:  -  Assess patient's ability to carry out ADLs; assess patient's baseline for ADL function and identify physical deficits which impact ability to perform ADLs (bathing, care of mouth/teeth, toileting, grooming, dressing, etc.)  - Assess/evaluate cause of self-care deficits   - Assess range of motion  - Assess patient's mobility; develop plan if impaired  - Assess patient's need for assistive devices and provide as appropriate  - Encourage maximum independence but intervene and supervise when necessary  - Involve family in performance of ADLs  - Assess for home care needs following discharge   - Consider OT consult to assist with ADL evaluation and planning for discharge  - Provide patient education as appropriate  Outcome: Progressing  Goal: Maintains/Returns to pre admission functional level  Description: INTERVENTIONS:  - Perform BMAT or MOVE assessment daily.   - Set and communicate daily mobility goal to care team and patient/family/caregiver. - Collaborate with rehabilitation services on mobility goals if consulted  - Perform Range of Motion  times a day. - Reposition patient every  hours. - Dangle patient  times a day  - Stand patient  times a day  - Ambulate patient  times a day  - Out of bed to chair  times a day   - Out of bed for meals  times a day  - Out of bed for toileting  - Record patient progress and toleration of activity level   Outcome: Progressing     Problem: Prexisting or High Potential for Compromised Skin Integrity  Goal: Skin integrity is maintained or improved  Description: INTERVENTIONS:  - Identify patients at risk for skin breakdown  - Assess and monitor skin integrity  - Assess and monitor nutrition and hydration status  - Monitor labs   - Assess for incontinence   - Turn and reposition patient  - Assist with mobility/ambulation  - Relieve pressure over bony prominences  - Avoid friction and shearing  - Provide appropriate hygiene as needed including keeping skin clean and dry  - Evaluate need for skin moisturizer/barrier cream  - Collaborate with interdisciplinary team   - Patient/family teaching  - Consider wound care consult   Outcome: Progressing     Problem: Nutrition/Hydration-ADULT  Goal: Nutrient/Hydration intake appropriate for improving, restoring or maintaining nutritional needs  Description: Monitor and assess patient's nutrition/hydration status for malnutrition. Collaborate with interdisciplinary team and initiate plan and interventions as ordered. Monitor patient's weight and dietary intake as ordered or per policy. Utilize nutrition screening tool and intervene as necessary. Determine patient's food preferences and provide high-protein, high-caloric foods as appropriate.      INTERVENTIONS:  - Monitor oral intake, urinary output, labs, and treatment plans  - Assess nutrition and hydration status and recommend course of action  - Evaluate amount of meals eaten  - Assist patient with eating if necessary   - Allow adequate time for meals  - Recommend/ encourage appropriate diets, oral nutritional supplements, and vitamin/mineral supplements  - Order, calculate, and assess calorie counts as needed  - Recommend, monitor, and adjust tube feedings and TPN/PPN based on assessed needs  - Assess need for intravenous fluids  - Provide specific nutrition/hydration education as appropriate  - Include patient/family/caregiver in decisions related to nutrition  Outcome: Progressing     Problem: PAIN - ADULT  Goal: Verbalizes/displays adequate comfort level or baseline comfort level  Description: Interventions:  - Encourage patient to monitor pain and request assistance  - Assess pain using appropriate pain scale  - Administer analgesics based on type and severity of pain and evaluate response  - Implement non-pharmacological measures as appropriate and evaluate response  - Consider cultural and social influences on pain and pain management  - Notify physician/advanced practitioner if interventions unsuccessful or patient reports new pain  Outcome: Progressing     Problem: INFECTION - ADULT  Goal: Absence or prevention of progression during hospitalization  Description: INTERVENTIONS:  - Assess and monitor for signs and symptoms of infection  - Monitor lab/diagnostic results  - Monitor all insertion sites, i.e. indwelling lines, tubes, and drains  - Monitor endotracheal if appropriate and nasal secretions for changes in amount and color  - [place] appropriate cooling/warming therapies per order  - Administer medications as ordered  - Instruct and encourage patient and family to use good hand hygiene technique  - Identify and instruct in appropriate isolation precautions for identified infection/condition  Outcome: Progressing     Problem: SAFETY ADULT  Goal: Maintain or return to baseline ADL function  Description: INTERVENTIONS:  -  Assess patient's ability to carry out ADLs; assess patient's baseline for ADL function and identify physical deficits which impact ability to perform ADLs (bathing, care of mouth/teeth, toileting, grooming, dressing, etc.)  - Assess/evaluate cause of self-care deficits   - Assess range of motion  - Assess patient's mobility; develop plan if impaired  - Assess patient's need for assistive devices and provide as appropriate  - Encourage maximum independence but intervene and supervise when necessary  - Involve family in performance of ADLs  - Assess for home care needs following discharge   - Consider OT consult to assist with ADL evaluation and planning for discharge  - Provide patient education as appropriate  Outcome: Progressing  Goal: Maintains/Returns to pre admission functional level  Description: INTERVENTIONS:  - Perform BMAT or MOVE assessment daily.   - Set and communicate daily mobility goal to care team and patient/family/caregiver. - Collaborate with rehabilitation services on mobility goals if consulted  - Perform Range of Motion  times a day. - Reposition patient every  hours.   - Dangle patient  times a day  - Stand patient  times a day  - Ambulate patient  times a day  - Out of bed to chair  times a day   - Out of bed for meals  times a day  - Out of bed for toileting  - Record patient progress and toleration of activity level   Outcome: Progressing  Goal: Patient will remain free of falls  Description: INTERVENTIONS:  - Educate patient/family on patient safety including physical limitations  - Instruct patient to call for assistance with activity   - Consult OT/PT to assist with strengthening/mobility   - Keep Call bell within reach  - Keep bed low and locked with side rails adjusted as appropriate  - Keep care items and personal belongings within reach  - Initiate and maintain comfort rounds  - Make Fall Risk Sign visible to staff  - Offer Toileting every  Hours, in advance of need  - Initiate/Maintain alarm  - Obtain necessary fall risk management equipment:  - Apply yellow socks and bracelet for high fall risk patients  - Consider moving patient to room near nurses station  Outcome: Progressing     Problem: DISCHARGE PLANNING  Goal: Discharge to home or other facility with appropriate resources  Description: INTERVENTIONS:  - Identify barriers to discharge w/patient and caregiver  - Arrange for needed discharge resources and transportation as appropriate  - Identify discharge learning needs (meds, wound care, etc.)  - Arrange for interpretive services to assist at discharge as needed  - Refer to Case Management Department for coordinating discharge planning if the patient needs post-hospital services based on physician/advanced practitioner order or complex needs related to functional status, cognitive ability, or social support system  Outcome: Progressing     Problem: Knowledge Deficit  Goal: Patient/family/caregiver demonstrates understanding of disease process, treatment plan, medications, and discharge instructions  Description: Complete learning assessment and assess knowledge base.   Interventions:  - Provide teaching at level of understanding  - Provide teaching via preferred learning methods  Outcome: Progressing     Problem: RESPIRATORY - ADULT  Goal: Achieves optimal ventilation and oxygenation  Description: INTERVENTIONS:  - Assess for changes in respiratory status  - Assess for changes in mentation and behavior  - Position to facilitate oxygenation and minimize respiratory effort  - Oxygen administered by appropriate delivery if ordered  - Initiate smoking cessation education as indicated  - Encourage broncho-pulmonary hygiene including cough, deep breathe, Incentive Spirometry  - Assess the need for suctioning and aspirate as needed  - Assess and instruct to report SOB or any respiratory difficulty  - Respiratory Therapy support as indicated  Outcome: Progressing     Problem: GENITOURINARY - ADULT  Goal: Maintains or returns to baseline urinary function  Description: INTERVENTIONS:  - Assess urinary function  - Encourage oral fluids to ensure adequate hydration if ordered  - Administer IV fluids as ordered to ensure adequate hydration  - Administer ordered medications as needed  - Offer frequent toileting  - Follow urinary retention protocol if ordered  Outcome: Progressing     Problem: MUSCULOSKELETAL - ADULT  Goal: Maintain or return mobility to safest level of function  Description: INTERVENTIONS:  - Assess patient's ability to carry out ADLs; assess patient's baseline for ADL function and identify physical deficits which impact ability to perform ADLs (bathing, care of mouth/teeth, toileting, grooming, dressing, etc.)  - Assess/evaluate cause of self-care deficits   - Assess range of motion  - Assess patient's mobility  - Assess patient's need for assistive devices and provide as appropriate  - Encourage maximum independence but intervene and supervise when necessary  - Involve family in performance of ADLs  - Assess for home care needs following discharge   - Consider OT consult to assist with ADL evaluation and planning for discharge  - Provide patient education as appropriate  Outcome: Progressing

## 2023-06-21 NOTE — DISCHARGE INSTRUCTIONS
Upper Endoscopy   WHAT YOU NEED TO KNOW:   An upper endoscopy is also called an upper gastrointestinal (GI) endoscopy, or an esophagogastroduodenoscopy (EGD). It is a procedure to examine the inside of your esophagus, stomach, and duodenum (first part of the small intestine) with a scope. You may feel bloated, gassy, or have some abdominal discomfort after your procedure. Your throat may be sore for 24 to 36 hours. You may burp or pass gas from air that is still inside your body. DISCHARGE INSTRUCTIONS:   Seek care immediately if:   You have sudden, severe abdominal pain. You have problems swallowing. You have a large amount of black, sticky bowel movements or blood in your bowel movements. You have sudden trouble breathing. You feel weak, lightheaded, or faint or your heart beats faster than normal for you. Contact your healthcare provider if:   You have a fever and chills. You have nausea or are vomiting. Your abdomen is bloated or feels full and hard. You have abdominal pain. You have black, sticky bowel movements or blood in your bowel movements. You have not had a bowel movement for 3 days after your procedure. You have rash or hives. You have questions or concerns about your procedure. Activity:   Do not lift, strain, or run for 24 hours after your procedure. Rest after your procedure. You have been given medicine to relax you. Do not drive or make important decisions until the day after your procedure. Return to your normal activity as directed. Relieve gas and discomfort from bloating by lying on your right side with a heating pad on your abdomen. You may need to take short walks to help the gas move out. Eat small meals until bloating is relieved. Follow up with your healthcare provider as directed: Write down your questions so you remember to ask them during your visits.      If you take a “blood thinner”, please review the specific instructions from your endoscopist about when you should resume it. These can be found in the “Recommendation” and “Your Medication list” sections of this After Visit Summary.

## 2023-06-22 NOTE — PROGRESS NOTES
PT s/p VBS, aspiration with both solid and liquids, was made NPO, will continue to monitor care of plan. Will need to consider initiating TF if no improvement. Recommend Meenu Mena@[facility], advance by 10mL every 6hrs to goal of 65mL/hr, water flushes 50mL every 4hrs provides total of 2340cal, 100g pro, 1486mL. Will continue to monitor labs, tolerance, weight and need for any further adjustments.

## 2023-06-22 NOTE — WOUND OSTOMY CARE
Progress Note - Wound   Mora Perez 81 y.o. male MRN: [id number]  Unit/Bed#: [facility] 504-01 Encounter: [id number]        Assessment:   Patient seen today for wound care follow up assessment. Patient is incontinent of bowel and bladder. Patient is dependent for all care needs, P-500 specialty bed ordered for patient. Patient is currently NPO per speech due to issues with swallowing and aspiration.     Assessment Findings:   B/L heels intact and blanching, preventative skin care orders placed.      1. POA stage II sacrum- small partial thickness wound with pink tissue, periwound skin is intact, no drainage present.      2. Venous ulcer right lower leg- wound is full thickness with mostly adhered, yellow slough tissue, and beefy red granular tissue, large amount of foul-smelling serosanguineous drainage present, Periwound skin is edematous and flaky. Wound does appear to have improved with this week's assessment. 3. Skin tear B/L arms- patient's arms are edematous and partial thickness yellow tissue wounds noted, scant serosanguineous drainage. Applied foam due to edema.     No induration, fluctuance, odor, warmth/temperature differences, redness, or purulence noted to the above noted wounds and skin areas assessed. New dressings applied per orders listed below. Patient tolerated well- no s/s of non-verbal pain or discomfort observed during the encounter. Bedside nurse aware of plan of care. See flow sheets for more detailed assessment findings. Wound care will continue to follow. Skin care plans:  1-Hydraguard to bilateral heels BID and PRN  2-Elevate heels to offload pressure. 3-Ehob cushion in chair when out of bed. 4-Moisturize skin daily with skin nourishing cream.  5-Turn/reposition q2h or when medically stable for pressure re-distribution on skin.    6-Calazime paste to sacrum/buttocks BID and PRN   7-Cleanse right leg with normal saline, apply silver alginate to wound bed, cover with ABD, secure with juanjo and tape. Change daily and PRN soilage/displacement. 8-Cleanse B/L arm skin tears with soap and water. Apply Allevyn foam, angelo T for treatment, and change every 3 days and PRN soilage/displacement  9-P-500 specialty bed     Wound 01/13/23 Ankle Right;Medial (Active)   Wound Image   06/22/23 1028   Wound Description Beefy red;Epithelialization;Fragile;Pink 06/22/23 1028   Deedee-wound Assessment Edema;Scaly 06/22/23 1028   Wound Length (cm) 13 cm 06/22/23 1028   Wound Width (cm) 3.5 cm 06/22/23 1028   Wound Depth (cm) 0.3 cm 06/22/23 1028   Wound Surface Area (cm^2) 45.5 cm^2 06/22/23 1028   Wound Volume (cm^3) 13.65 cm^3 06/22/23 1028   Calculated Wound Volume (cm^3) 13.65 cm^3 06/22/23 1028   Change in Wound Size % -526.15 06/22/23 1028   Tunneling 0 cm 06/22/23 1028   Tunneling in depth located at 0 06/22/23 1028   Undermining 0 06/22/23 1028   Undermining is depth extending from 0 06/22/23 1028   Wound Site Closure JIM 06/22/23 1028   Drainage Amount Moderate 06/22/23 1028   Drainage Description Serosanguineous 06/22/23 1028   Non-staged Wound Description Full thickness 06/22/23 1028   Treatments Cleansed 06/22/23 1028   Dressing ABD;Calcium Alginate with Silver;Dry dressing 06/22/23 1028   Wound packed? No 06/22/23 1028   Packing- # removed 0 06/22/23 1028   Packing- # inserted 0 06/22/23 1028   Dressing Changed New 06/22/23 1028   Patient Tolerance Tolerated well 06/22/23 1028   Dressing Status Clean;Dry; Intact 06/22/23 1028       Wound 06/15/23 Pressure Injury Buttocks Right (Active)   Wound Image   06/22/23 1033   Wound Description Beefy red 06/21/23 1915   Pressure Injury Stage 2 06/20/23 1700   Deedee-wound Assessment Clean;Dry; Intact;Fragile 06/21/23 1915   Wound Length (cm) 0.4 cm 06/22/23 1033   Wound Width (cm) 0.2 cm 06/22/23 1033   Wound Depth (cm) 0.1 cm 06/22/23 1033   Wound Surface Area (cm^2) 0.08 cm^2 06/22/23 1033   Wound Volume (cm^3) 0.008 cm^3 06/22/23 1033   Calculated Wound Volume (cm^3) 0.01 cm^3 06/22/23 1033   Drainage Amount None 06/20/23 0800   Treatments Elevated 06/20/23 1700   Dressing Protective barrier 06/21/23 1915   Dressing Status Clean;Dry; Intact 06/21/23 1915       Wound 06/17/23 Skin Tear Arm Left (Active)   Wound Image   06/22/23 1031   Wound Description Yellow 06/22/23 1031   Deedee-wound Assessment Clean;Dry; Intact;Fragile 06/22/23 1031   Wound Length (cm) 1.3 cm 06/22/23 1031   Wound Width (cm) 1.2 cm 06/22/23 1031   Wound Depth (cm) 0.1 cm 06/22/23 1031   Wound Surface Area (cm^2) 1.56 cm^2 06/22/23 1031   Wound Volume (cm^3) 0.156 cm^3 06/22/23 1031   Calculated Wound Volume (cm^3) 0.16 cm^3 06/22/23 1031   Tunneling 0 cm 06/22/23 1031   Tunneling in depth located at 0 06/22/23 1031   Undermining 0 06/22/23 1031   Undermining is depth extending from 0 06/22/23 1031   Wound Site Closure JIM 06/22/23 1031   Drainage Amount Scant 06/22/23 1031   Drainage Description Serosanguineous 06/22/23 1031   Non-staged Wound Description Partial thickness 06/22/23 1031   Treatments Cleansed 06/22/23 1031   Dressing Foam, Silicon (eg. Allevyn, etc) 06/22/23 1031   Wound packed? No 06/22/23 1031   Packing- # removed 0 06/22/23 1031   Packing- # inserted 0 06/22/23 1031   Dressing Changed New 06/22/23 1031   Patient Tolerance Tolerated well 06/22/23 1031   Dressing Status Clean;Dry; Intact 06/22/23 1031       Wound 06/17/23 Skin Tear Arm Anterior;Right (Active)   Wound Image   06/22/23 1319   Wound Description Yellow 06/22/23 1319   Deedee-wound Assessment Purple 06/22/23 1319   Wound Length (cm) 1 cm 06/22/23 1319   Wound Width (cm) 0.8 cm 06/22/23 1319   Wound Depth (cm) 0.1 cm 06/22/23 1319   Wound Surface Area (cm^2) 0.8 cm^2 06/22/23 1319   Wound Volume (cm^3) 0.08 cm^3 06/22/23 1319   Calculated Wound Volume (cm^3) 0.08 cm^3 06/22/23 1319   Tunneling 0 cm 06/22/23 1319   Tunneling in depth located at 0 06/22/23 1319   Undermining 0 06/22/23 1319   Undermining is depth extending from 0 06/22/23 1319   Drainage Amount Scant 06/22/23 1319   Drainage Description Serosanguineous 06/22/23 1319   Non-staged Wound Description Partial thickness 06/22/23 1319   Treatments Cleansed 06/22/23 1319   Dressing Foam, Silicon (eg. Allevyn, etc) 06/22/23 1319   Wound packed? No 06/22/23 1319   Packing- # removed 0 06/22/23 1319   Packing- # inserted 0 06/22/23 1319   Dressing Changed New 06/22/23 1319   Patient Tolerance Tolerated well 06/22/23 1319   Dressing Status Clean;Dry; Intact 06/22/23 1319       Kaley Hernandez BSN, RN, [facility] [employer]

## 2023-06-22 NOTE — QUICK NOTE
Primary team reached out to consider PEG tube placement because patient failed his speech therapy evaluation yesterday as well as earlier today. Would recommend repeat evaluation after improvement in medical conditions and if continues to have difficulties, we will consider. No contra-indications to antiplatelet or anticoag use at present, if initiated will need appropriate holding prior to PEG if it becomes indicated. Please re consult when needed.

## 2023-06-22 NOTE — CONSULTS
[address] [place] [address]  Consult  Name: Alli Hernandez 81 y.o. male I MRN: [id number]  Unit/Bed#: [facility] 422-23 I Date of Admission: 6/14/2023   Date of Service: 6/22/2023 I Hospital Day: 8    Inpatient consult to Neurosurgery  Consult performed by: Severa Cannon, PA-C  Consult ordered by: Krystyna Valenzuela MD        Assessment/Plan   Acute low back pain  Assessment & Plan  Acute L1 vertebral body fracture   - admitted to the hospital with MDR E.coli bacteremia with septic shock and renal failure requiring dialysis and vasopressor support in the ICU   - has since been stabilized and managed on the med surg floor  - reports LBP x ~3weeks since a mechanical fall with is walker on carpet   - denies any LE radiculopathy, LE weakness, numbness, saddle anesthesia, BBI, urinary retention     Imaging:   · 6/21 CT L-spine: Oblique, nondisplaced fracture involving the L1 vertebral body without significant vertebral height loss or retropulsion. Chronic L4 compression fracture with moderate to severe anterior wedging. Multilevel degenerative disc disease with mild to moderate central canal narrowing at the L3-4 level. Plan:   · Continue to closely monitor neuro exam  · Frequent neurochecks per primary team  · Maintain normotensive BP goals, MAP > 65  · No acute neurosurgical intervention indicated at this time  · Case and imaging reviewed  · Mild, acute vertebral body fracture noted at L1 with no retropulsion and minimal loss of height. Noted chronic L4 compression fracture with significant loss of height.     · Recommend conservative management at this time  · LSO brace to be worn when OOB or HOB > 45 deg   · please obtain upright lumbar x-rays   · continue pain control per primary team  · PT/OT  · DVT ppx: sparkle Pittman for chem dvt ppx from a nsgy standpoint   · Medical management per primary team   · Social work following for assistance with dispo once medically cleared     Neurosurgery will follow from the periphery and review upright imaging once completed. Please reach out with any further questions or concerns. History of Present Illness   HPI: Haylee Smith is a 81y.o. year old male with PMH significant for afib, CHF, and DMII was initially admitted to the hospital on 6/14/2023 with DMR bacteremia secondary to a UTI with severe sepsis and renal failure. Pt was admitted to the ICU requiring vasopressor support and CVVH. Fortunately, pt was adequately resuscitated and stabilized with transfer to the [facility] surg floor out of ICU. ove rth elast few days, pt ahs been reporting LBP. Upon further questioning, pt reports he had new carpet placed and about 3 weeks ago he was walking with his walker and tripped and fell. Since this fall, he has reported LBP. CT completed which revealed an acute L1 vertebral body fracture for which nsgy was consulted. Upon my arrival to the bedside, pt reports LBP but states he ahs been improving since he has received pain medications. Denies any radiation of pain to his LE, LE weakness, numbness, saddle anesthesia, BBI, urinary retention. Review of Systems   Constitutional: Negative for chills and fever. Eyes: Negative for photophobia and visual disturbance. Respiratory: Negative for cough and shortness of breath. Cardiovascular: Negative for chest pain. Musculoskeletal: Positive for back pain, gait problem and myalgias. Negative for neck pain and neck stiffness. Skin: Negative for wound. Neurological: Negative for dizziness, tremors, seizures, syncope, facial asymmetry, speech difficulty, weakness, light-headedness, numbness and headaches. Psychiatric/Behavioral: Negative for agitation, behavioral problems, confusion and decreased concentration. The patient is not nervous/anxious.       Historical Information   Past Medical History:   Diagnosis Date   • Anemia    • Arthritis    • Atrial fibrillation ([address])    • Basal cell carcinoma 03/22/2022 Tip of Nose   • CHF (congestive heart failure) (HCC)    • Diabetes mellitus ([address])     Niddm   • DVT (deep vein thrombosis) in pregnancy 1966    not in pregnancy   • DVT (deep venous thrombosis) ([address]) 1966   • Dyslipidemia    • Encephalopathy acute 11/4/2022   • GERD (gastroesophageal reflux disease)    • Hyperlipidemia    • Hypertension    • Hypomagnesemia 10/19/2022   • Irregular heart beat     Afib   • Morbid obesity due to excess calories ([address])     Resolved 9/2/2014    • Pulmonary embolism (HCC)    • Sepsis ([address])    • Squamous cell skin cancer 07/30/2020    Left posterior scalp   • Visual impairment      Past Surgical History:   Procedure Laterality Date   • AORTIC VALVE REPLACEMENT     • CARDIAC DEFIBRILLATOR PLACEMENT  04/2014   • CARDIAC SURGERY  02/2014    AVR   • COLONOSCOPY     • INSERT / REPLACE / REMOVE PACEMAKER     • IR ASPIRATION BAKERS CYST  3/8/2023   • IR CHOLECYSTOSTOMY TUBE CHECK/CHANGE/REPOSITION/REINSERTION/UPSIZE  10/13/2022   • IR CHOLECYSTOSTOMY TUBE CHECK/CHANGE/REPOSITION/REINSERTION/UPSIZE  10/19/2022   • IR CHOLECYSTOSTOMY TUBE CHECK/CHANGE/REPOSITION/REINSERTION/UPSIZE  10/27/2022   • IR CHOLECYSTOSTOMY TUBE CHECK/CHANGE/REPOSITION/REINSERTION/UPSIZE  11/7/2022   • IR CHOLECYSTOSTOMY TUBE CHECK/CHANGE/REPOSITION/REINSERTION/UPSIZE  11/10/2022   • IR CHOLECYSTOSTOMY TUBE CHECK/CHANGE/REPOSITION/REINSERTION/UPSIZE  12/1/2022   • IR CHOLECYSTOSTOMY TUBE CHECK/CHANGE/REPOSITION/REINSERTION/UPSIZE  1/6/2023   • IR CHOLECYSTOSTOMY TUBE CHECK/CHANGE/REPOSITION/REINSERTION/UPSIZE  1/24/2023   • IR CHOLECYSTOSTOMY TUBE CHECK/CHANGE/REPOSITION/REINSERTION/UPSIZE  3/15/2023   • IR CHOLECYSTOSTOMY TUBE PLACEMENT  9/1/2022   • IR DRAINAGE TUBE CHECK AND/OR REMOVAL  3/22/2023   • IR DRAINAGE TUBE CHECK AND/OR REMOVAL  4/10/2023   • IR DRAINAGE TUBE PLACEMENT  4/6/2023   • IR TUNNELED DIALYSIS CATHETER PLACEMENT  6/19/2023   • JOINT REPLACEMENT Left     LTKR   • MOHS SURGERY  07/30/2020    Left posterior scalp, Dr. Brock So   • MOHS SURGERY  04/18/2022    BCC Tip of Nose- Dr. Vinod Miller   • Sherill Boer DIVJ&STRIP LONG SAPH Dennis Topher Right 8/17/2018    Procedure: LEG PERFORATED INJECTION SCLEROTHERAPY;  Surgeon: Elian Johnson MD;  Location: [facility];  Service: Vascular   • REPLACEMENT TOTAL KNEE Right    • TOTAL KNEE ARTHROPLASTY Left    • VASCULAR SURGERY     • VENA CAVA FILTER PLACEMENT      Interruption inferior vena cava, [place] filter, placement   • WISDOM TOOTH EXTRACTION       Social History     Substance and Sexual Activity   Alcohol Use Not Currently    Comment: no alcohol in 28 yrs     Social History     Substance and Sexual Activity   Drug Use Never     Social History     Tobacco Use   Smoking Status Never   Smokeless Tobacco Never     Family History   Problem Relation Age of Onset   • Arthritis Mother    • Stroke Mother    • Arthritis Father    • Cancer Father    • Arthritis Daughter      Meds/Allergies   all current active meds have been reviewed  Allergies   Allergen Reactions   • Tramadol Other (See Comments)     intolerance     Objective   I/O       06/20 0701  06/21 0700 06/21 0701  06/22 0700 06/22 0701  06/23 0700    P. O. 598  0    I.V. (mL/kg) 500 (3.5) 842.5 (6)     Blood 350      Other 100      IV Piggyback       Total Intake(mL/kg) 1548 (11) 842.5 (6) 0 (0)    Urine (mL/kg/hr) 130 (0) 613 (0.2)     Other 933      Total Output 1063 613     Net +485 +229.5 0           Unmeasured Urine Occurrence 1 x            Physical Exam  Constitutional:       General: He is not in acute distress. Appearance: He is obese. He is ill-appearing. He is not toxic-appearing. Comments: Obese, ill appearing, tired elderly male    HENT:      Head: Normocephalic and atraumatic. Right Ear: External ear normal.      Left Ear: External ear normal.      Nose: Nose normal. No congestion or rhinorrhea.       Mouth/Throat:      Mouth: Mucous membranes are moist.   Eyes:      General: No scleral icterus. Right eye: No discharge. Left eye: No discharge. Extraocular Movements: Extraocular movements intact. Conjunctiva/sclera: Conjunctivae normal.      Pupils: Pupils are equal, round, and reactive to light. Cardiovascular:      Rate and Rhythm: Normal rate. Pulmonary:      Effort: Pulmonary effort is normal. No respiratory distress. Comments: No resp distress on NC   Abdominal:      General: Abdomen is flat. Musculoskeletal:         General: Tenderness present. No deformity or signs of injury. Normal range of motion. Cervical back: Normal range of motion and neck supple. No rigidity or tenderness. Right lower leg: Edema present. Left lower leg: Edema present. Comments: art LE edema and art UE edema     Point tenderness noted over L spine    Skin:     General: Skin is warm and dry. Capillary Refill: Capillary refill takes less than 2 seconds. Findings: Erythema present. Comments: art LE edema, venous stasis changes    Neurological:      General: No focal deficit present. Mental Status: He is alert and oriented to person, place, and time. Mental status is at baseline. Cranial Nerves: No cranial nerve deficit. Sensory: No sensory deficit. Motor: No weakness. Coordination: Coordination normal.      Comments: GCS 15   A&Ox3   Appropriately answering questions and following commands   No dysarthria or aphasia   No appreciated CN deficits   Strength 4/5 throughout to art UE and art LE   - baseline significant deconditioning, appropriate for age and current medical condition   Sensation intact to light touch to art UE and art LE   No drift or ataxia appreciated art      Psychiatric:         Mood and Affect: Mood normal.         Behavior: Behavior normal.         Thought Content: Thought content normal.         Judgment: Judgment normal.       Neurologic Exam     Mental Status   Oriented to person, place, and time. Cranial Nerves     CN III, IV, VI   Pupils are equal, round, and reactive to light. Vitals:Blood pressure 120/65, pulse 92, temperature 98.5 °F (36.9 °C), temperature source Oral, resp. rate 18, height 6' 1" (1.854 m), weight (!) 140 kg (308 lb 3.3 oz), SpO2 95 %. ,Body mass index is 40.66 kg/m². Lab Results:   Results from last 7 days   Lab Units 06/22/23  0355 06/21/23  0517 06/20/23  0455 06/19/23  1446 06/19/23  0550 06/17/23  1217 06/17/23  0535   WBC Thousand/uL 9.44 11.47* 10.96*  --  12.64*   < > 10.68*   HEMOGLOBIN g/dL 8.2* 8.2* 7.0*  --  7.3*   < > 6.9*   HEMATOCRIT % 26.5* 26.1* 22.9*  --  24.6*   < > 22.4*   PLATELETS Thousands/uL 92* 69* 67*   < > 35*   < > 88*   NEUTROS PCT %  --   --  74  --  76*  --  85*   MONOS PCT %  --   --  9  --  9  --  7   EOS PCT %  --   --  2  --  3  --  0    < > = values in this interval not displayed. Results from last 7 days   Lab Units 06/22/23  0355 06/21/23  0517 06/20/23  0455 06/19/23  0550 06/16/23  0103 06/15/23  2010 06/15/23  1949   POTASSIUM mmol/L 4.0 3.9 3.9 4.0   < > 5.4*  --    CHLORIDE mmol/L 106 106 108 108   < > 100  --    CO2 mmol/L 30 27 26 27   < > 18*  --    CO2, I-STAT mmol/L  --   --   --   --   --   --  23   BUN mg/dL 18 14 18 12   < > 34*  --    CREATININE mg/dL 1.90* 1.61* 1.83* 1.05   < > 3.89*  --    CALCIUM mg/dL 8.1* 8.1* 8.7 8.8   < > 9.1  --    ALK PHOS U/L  --   --   --  79  --  101  --    ALT U/L  --   --   --  23  --  30  --    AST U/L  --   --   --  39  --  58*  --    GLUCOSE, ISTAT mg/dl  --   --   --   --   --   --  194*    < > = values in this interval not displayed.      Results from last 7 days   Lab Units 06/21/23  0517 06/20/23  0455 06/19/23  0550   MAGNESIUM mg/dL 1.8 1.9 2.0     Results from last 7 days   Lab Units 06/21/23  0517 06/20/23  0455 06/19/23  0550   PHOSPHORUS mg/dL 2.3 2.7 1.8*     Results from last 7 days   Lab Units 06/22/23  0355 06/21/23  0517 06/20/23  0604 06/18/23  0617 06/17/23  0535 06/16/23  2211 06/16/23  1433   INR  1.74* 1.66* 1.84*   < > 1.57*  --   --    PTT seconds  --   --   --   --  111* 154* 102*    < > = values in this interval not displayed. No results found for: "TROPONINT"  ABG:  Lab Results   Component Value Date    PHART 7.334 (L) 06/16/2023    NVC8SKV 36.6 06/16/2023    PO2ART 71.2 (L) 06/16/2023    MZP7HTF 19.0 (L) 06/16/2023    BEART -6.2 06/16/2023    SOURCE Line, Arterial 06/16/2023     Imaging Studies: I have personally reviewed pertinent reports. CT spine lumbar wo contrast    Result Date: 6/22/2023  Narrative: CT LUMBAR SPINE WITHOUT CONTRAST INDICATION:   acute severe pain, XR showps possilbe L2 fracture. COMPARISON: CT abdomen and pelvis 6/14/2023. Lumbar radiographs 6/21/2023 TECHNIQUE:  Contiguous axial images through the lumbar spine were obtained. Sagittal and coronal reconstructions were performed. IV Contrast: None Radiation dose length product (DLP) for this visit:  1555.81 mGy-cm . This examination, like all CT scans performed in the [facility], was performed utilizing techniques to minimize radiation dose exposure, including the use of iterative reconstruction and automated exposure control. IMAGE QUALITY:  Diagnostic. FINDINGS: ALIGNMENT:  There are 5 lumbar type vertebral bodies. Normal alignment of the lumbar spine. No spondylolysis or spondylolisthesis. VERTEBRAE: Diffuse osteopenia is again noted. There is an acute oblique fracture involving the L1 vertebral body extending through the anterior and right lateral vertebral margins. The fracture is better delineated on the current examination compared to the prior CT (series 601: 68) with no significant vertebral height loss. Chronic L4 compression fracture with moderate to severe anterior wedging and no retropulsion. DEGENERATIVE CHANGES: Lower Thoracic spine:  Normal lower thoracic disc spaces. L1-2: No focal disc herniation, central canal stenosis, or neural foraminal narrowing. L2-3: Mild disc bulge with bilateral ligamentum flavum and facet hypertrophy. Mild central canal and bilateral subarticular/lateral recess narrowing. Mild bilateral neural foraminal stenosis. L3-4: Mild to moderate diffuse disc bulge with bilateral ligamentum flavum and facet hypertrophy. Mild to moderate central canal  narrowing. Mild bilateral neural foraminal stenosis. L4-5: Bilateral ligamentum flavum and facet hypertrophy. Mild central canal and bilateral subarticular/lateral recess narrowing. Mild bilateral neural foraminal stenosis. L5-S1: No focal disc herniation, central canal stenosis, or neural foraminal narrowing. PARASPINAL SOFT TISSUES: Atherosclerotic aortic calcifications with ectasia. Bilateral pleural effusions, increased on the left. Impression: 1. Oblique, nondisplaced fracture involving the L1 vertebral body without significant vertebral height loss or retropulsion. 2. Chronic L4 compression fracture with moderate to severe anterior wedging. 3. Multilevel degenerative disc disease with mild to moderate central canal narrowing at the L3-4 level. The study was marked in [facility] for immediate notification. Workstation performed: [id number]     FL barium swallow video w speech    Result Date: 6/21/2023  Narrative: A video barium swallow study was performed by the Department of Speech Pathology. Please refer to the report for the official interpretation. The images are stored for archival purposes only. Study images were not formally reviewed by the Radiology Department. XR spine lumbar minimum 4 views non injury    Result Date: 6/21/2023  Narrative: LUMBAR SPINE INDICATION:   low back pain. COMPARISON: Lumbar radiographs 8/29/2011. CT abdomen pelvis 6/14/2023. VIEWS:  XR SPINE LUMBAR MINIMUM 4 VIEWS NON INJURY FINDINGS: Limited study. A true lateral image is not available. There are 5 non rib bearing lumbar vertebral bodies. Unchanged severe L4 compression deformity.  Progressive loss of the height of the L2 vertebral body may represent an acute fracture. Moderate diffuse degenerative changes. The pedicles appear intact. There are atherosclerotic calcifications. Soft tissues are otherwise unremarkable. Impression: Limited study without a true lateral image. Unchanged severe L4 compression deformity. Backspace Progressive loss of height of the L2 vertebral body may represent an acute fracture. Consider CT follow-up. The study was marked in [facility] for immediate notification. Workstation performed: [id number]     EGD    Result Date: 6/21/2023  Narrative: Table formatting from the original result was not included. [address] [place] [address] Endoscopy [address] [place] [address] DATE OF SERVICE: 6/21/23 PHYSICIAN(S): Attending: Michelle Tim DO Fellow: Elio Robles MD INDICATION: Melena POST-OP DIAGNOSIS: See the impression below. PREPROCEDURE: Informed consent was obtained for the procedure, including sedation. Risks of perforation, hemorrhage, adverse drug reaction and aspiration were discussed. The patient was placed in the left lateral decubitus position. Patient was explained about the risks and benefits of the procedure. Risks including but not limited to bleeding, infection, and perforation were explained in detail. Also explained about less than 100% sensitivity with the exam and other alternatives. PROCEDURE: EGD DETAILS OF PROCEDURE: Patient was taken to the procedure room where a time out was performed to confirm correct patient and correct procedure. The patient underwent monitored anesthesia care, which was administered by an anesthesia professional. The patient's blood pressure, heart rate, level of consciousness, respirations and oxygen were monitored throughout the procedure. The scope was advanced to the second part of the duodenum. Retroflexion was performed in the fundus. The patient experienced no blood loss. The procedure was not difficult.  The patient tolerated the procedure well. There were no apparent adverse events. ANESTHESIA INFORMATION: ASA: IV Anesthesia Type: IV Sedation with Anesthesia MEDICATIONS: No administrations occurring from 1307 to 1321 on 06/21/23 FINDINGS: Non-obstructing Schatzki ring in the GE junction 2 cm hiatal hernia without Vivi Henderson lesions present - GE junction 43 cm from the incisors, diaphragmatic impression 45 cm from the incisors Mild erythematous mucosa with erosion in the fundus of the stomach and body of the stomach, consistent with gastritis; performed cold forceps biopsy to rule out H. pylori Erythematous mucosa in the duodenal bulb and 1st part of the duodenum Single small, superficial ulcer in the 1st part of the duodenum with clean base (Chandana III) SPECIMENS: ID Type Source Tests Collected by Time Destination 1 :  Tissue Stomach TISSUE EXAM Armida Kerr MD 6/21/2023  1:15 PM      Impression: Nonobstructive Schatzki's ring at GE junction. 2 cm hiatal hernia without Giovany lesions. Mild gastritis, biopsied. Mild duodenitis with single small erosion/clean-based ulcer in first part of duodenum. RECOMMENDATION:  Await pathology results Transition to pantoprazole 40 p.o. daily. Resume diet as tolerated. GI team will sign off. Guru Nick DO     IR tunneled dialysis catheter placement    Result Date: 6/19/2023  Narrative: PROCEDURE: Tunneled dialysis catheter placement Procedural Personnel Attending physician(s): Dr. Patricio Celeste Pre-procedure diagnosis: Acute kidney injury Post-procedure diagnosis: Same Indication (QCDR): Need for long-term hemodialysis. PROCEDURE SUMMARY: - Venous access with ultrasound guidance - Tunneled dialysis catheter insertion with fluoroscopic guidance PROCEDURE DETAILS: Pre-procedure Consent: Informed consent for the procedure including risks, benefits and alternatives was obtained and time-out was performed prior to the procedure.  Preparation (MIPS): The site was prepared and draped using all elements of maximal sterile barrier technique including sterile gloves, sterile gown, cap, mask, large sterile sheet, sterile ultrasound probe cover, hand hygiene and cutaneous antisepsis with 2% chlorhexidine. Anesthesia/sedation Level of anesthesia/sedation: Moderate sedation (conscious sedation) Anesthesia/sedation administered by: IR nurse under attending supervision with continuous monitoring of the patient's level of consciousness and physiologic status Total intra-service sedation time (minutes): 30 Access Local anesthesia was administered. The vessel was sonographically evaluated and determined to be patent. Real time ultrasound was used to visualize needle entry into the vessel and a permanent image was stored. Vein accessed (QCDR): Right internal jugular vein Internal jugular vein patency (QCDR): Patent Access technique: Micropuncture set with 21 gauge needle Catheter placement An incision was made near the venous access site and the catheter was tunneled subcutaneously to the venous access site. The catheter was advanced via a peel-away sheath into the vein under fluoroscopic guidance. Catheter tip location was fluoroscopically verified and a permanent image was stored. Catheter placed: 15.5 Macedonian 28 cm Medcomp Titan double-lumen dialysis catheter Catheter cuff-to-tip length (cm): 23 Catheter flush: Normal saline Closure A sterile dressing was applied. Access site closure technique: Tissue adhesive Catheter securement technique: 0 Prolene suture Radiation Dose Fluoroscopy time (minutes): 0.4 Reference air kerma (mGy): 3 Additional Details Estimated blood loss (mL): 5 Complications: No immediate complications. Impression: Insertion of right IJV tunneled dialysis catheter, with tip in the expected location of the right atrium. Plan: The catheter may be used immediately.  Workstation performed: [id number]     XR chest portable ICU    Result Date: 6/16/2023  Narrative: CHEST INDICATION:   line placement. COMPARISON: June 14, 2023 EXAM PERFORMED/VIEWS:  XR CHEST PORTABLE ICU FINDINGS: Right-sided line seen with tip in the SVC An additional left-sided line seen with tip at the SVC right atrial junction. Dual-lead pacemaker with tip of lead in the right atrium right ventricle Left atrial appendage clip Cardiomegaly seen Increased lung markings seen suggest congestion Blunting of the both CP angle due to small effusion Postsurgical changes from prior median sternotomy     Impression: Left-sided line seen with tip in the SVC right atrial junction No pneumothorax Pulmonary congestion with small effusion Workstation performed: [id number]     XR chest portable ICU    Result Date: 6/15/2023  Narrative: CHEST INDICATION:   cvl. COMPARISON: 6/14/2023 at 5:57 p.m. EXAM PERFORMED/VIEWS:  XR CHEST PORTABLE ICU 11:43 p.m. FINDINGS: Infraclavicular chest wall AICD. Right jugular sheath tip in the right innominate vein. Median sternotomy. Atrial clip. aVR. Heart is top normal in size. Mild vascular and interstitial prominence suggest mild ongoing volume overload. Bibasilar atelectasis. Small bilateral effusions. No pneumothorax. Degenerative change in the spine and shoulders     Impression: Stable mild congestion and small bilateral effusions. Workstation performed: [id number]     Echo complete w/ contrast if indicated    Result Date: 6/15/2023  Narrative: •  Left Ventricle: Left ventricular cavity size is normal. Wall thickness is normal. There is moderate concentric hypertrophy. There is moderate asymmetric hypertrophy of the septal wall. The left ventricular ejection fraction is 70%. Systolic function is normal. Wall motion is normal. Diastolic function is normal. •  IVS: There is abnormal septal motion. There is diastolic flattening of the interventricular septum consistent with right ventricle volume overload. •  Right Ventricle: Right ventricular cavity size is moderately dilated.  Systolic function is normal. • Left Atrium: The atrium is moderately dilated. •  Mitral Valve: There is mild regurgitation. There is mild stenosis. The mitral valve mean gradient is 7mmHg. The mitral valve area by pressure half time is 3.01cm2. •  Tricuspid Valve: There is moderate regurgitation. The right ventricular systolic pressure is severely elevated. The estimated right ventricular systolic pressure is 71.10 mmHg. •  Aorta: The aortic root is normal in size. The aortic root exhibited moderate fibrocalcific change. XR chest 1 view portable    Result Date: 6/15/2023  Narrative: CHEST INDICATION:   weakness. COMPARISON: Compared with 6/7/2023 EXAM PERFORMED/VIEWS:  XR CHEST PORTABLE FINDINGS: Sternotomy wires. Left chest wall pacemaker. Left atrial clip. Cardiomediastinal silhouette appears unremarkable. Poor inspiration with prominent markings. Blunted costophrenic angles bilaterally. Bibasilar prominent markings. No pneumothorax. Osseous structures appear within normal limits for patient age. Impression: Possible mild congestion and small effusions Workstation performed: [id number]     US bedside procedure    Result Date: 6/15/2023  Narrative: [id number]    US bedside procedure    Result Date: 6/15/2023  Narrative: [id number]    US bedside procedure    Result Date: 6/15/2023  Narrative: [id number]    CT abdomen pelvis wo contrast    Result Date: 6/14/2023  Narrative: CT ABDOMEN AND PELVIS WITHOUT IV CONTRAST INDICATION:   Abdominal pain, acute, nonlocalized abd pain, renal failure, hx jeny, hx bleeding. COMPARISON: CT of the abdomen and pelvis from 6/7/2023 and CT of the chest, abdomen and pelvis from 4/4/2023 TECHNIQUE:  CT examination of the abdomen and pelvis was performed without intravenous contrast. Multiplanar 2D reformatted images were created from the source data.  This examination, like all CT scans performed in the [facility], was performed utilizing techniques to minimize radiation dose exposure, including the use of iterative reconstruction and automated exposure control. Radiation dose length product (DLP) for this visit:  1310.88 mGy-cm Enteric contrast was not administered. FINDINGS: Evaluation of visceral structures and vasculature is limited by lack of intravenous contrast. ABDOMEN LOWER CHEST: The partially imaged heart is enlarged. There is a small to moderate right pleural effusion, slightly enlarged from 6/7/2023. LIVER/BILIARY TREE:  Unremarkable unenhanced appearance of the liver. No biliary ductal dilation. GALLBLADDER:  The patient is status post cholecystectomy. SPLEEN: Unremarkable unenhanced appearance. PANCREAS:  Unremarkable unenhanced appearance. No dilation of the main pancreatic duct. ADRENAL GLANDS:  Unremarkable unenhanced appearance. KIDNEYS/URETERS: There is a 3 mm calculus in the right kidney. No ureteral calculi. No hydronephrosis. A hypodense lesion is seen in the right kidney which cannot be accurately assessed without IV contrast, however corresponds to a simple cyst on the prior CT. STOMACH AND BOWEL:  Evaluation of the gastrointestinal tract is somewhat limited by underdistention and lack of oral contrast. No bowel obstruction or convincing inflammation. <empty> APPENDIX:  The appendix is not visualized, however there are no secondary findings of appendicitis. ABDOMINOPELVIC CAVITY:  No pneumoperitoneum. Small amount of fluid in the perihepatic space and both paracolic gutters new from 6/7/2023. Nonspecific bilateral perinephric periureteral fat stranding overall similar to the prior study. No organized fluid collections to suggest an abscess. LYMPH NODES: No abdominal or pelvic lymphadenopathy. VESSELS: Normal caliber aorta. Scattered atherosclerotic calcifications in the abdominal aorta and its major branches.  PELVIS REPRODUCTIVE ORGANS:  The prostate gland is not enlarged. URINARY BLADDER: The urinary bladder is collapsed around a Combs catheter, limiting its evaluation. ABDOMINAL WALL/INGUINAL REGIONS: Small umbilical hernia containing fat and some fluid. Small fat-containing bilateral inguinal hernias. Essentially unchanged anasarca and intra-abdominal wall skin thickening. MUSCULOSKELETAL:  No focal aggressive osseous lesions. Degenerative changes of the spine. Unchanged appearance of severe compression deformity of L4 vertebral body with no retropulsion into the spinal canal. No acute fractures. Impression: No acute abdominal or pelvic pathology within limitation of a noncontrast study. Small abdominal and pelvic ascites, new from 6/7/2023. Essentially unchanged bilateral perinephric and periureteral fat stranding. No organized fluid collections to suggest an abscess. Small to moderate right pleural effusion, slightly enlarged from 6/7/2023. Multiple stable additional findings as above. Workstation performed: [id number]     XR chest 1 view    Result Date: 6/8/2023  Narrative: TRAUMA SERIES INDICATION:  TRAUMA. COMPARISON:  None VIEWS:  XR TRAUMA MULTIPLE FINDINGS: CHEST: Sternotomy wires. Left atrial clip. Pacemaker chamber. Prosthetic aortic valve. Supine frontal view of the chest is obtained. Cardiomediastinal silhouette is within normal limits accounting for technique and patient positioning. Poor inspiration. Prominent vascular interstitial markings. . Blunted costophrenic angles. . No pneumothorax is seen on this supine film. Upright images are more sensitive to detect anterior pneumothoraces if relevant. No displaced fractures. 1 view reviewed. 1 view reviewed. Impression: No acute cardiopulmonary disease within limitations of supine imaging. Workstation performed: [id number]     XR pelvis ap only 1 or 2 vw    Result Date: 6/8/2023  Narrative: TRAUMA SERIES INDICATION:  TRAUMA. COMPARISON:  None VIEWS:  XR TRAUMA MULTIPLE FINDINGS: CHEST: Sternotomy wires. Left atrial clip. Pacemaker chamber. Prosthetic aortic valve. Supine frontal view of the chest is obtained. Cardiomediastinal silhouette is within normal limits accounting for technique and patient positioning. Poor inspiration. Prominent vascular interstitial markings. . Blunted costophrenic angles. . No pneumothorax is seen on this supine film. Upright images are more sensitive to detect anterior pneumothoraces if relevant. No displaced fractures. 1 view reviewed. 1 view reviewed. Impression: No acute cardiopulmonary disease within limitations of supine imaging. Workstation performed: [email or web address] López bedside procedure    Result Date: 6/8/2023  Narrative: [id number]    TRAUMA - CT abdomen pelvis w contrast    Result Date: 6/7/2023  Narrative: CT ABDOMEN AND PELVIS WITH IV CONTRAST INDICATION:   TRAUMA. COMPARISON:  None. TECHNIQUE:  CT examination of the abdomen and pelvis was performed. Multiplanar 2D reformatted images were created from the source data. This examination, like all CT scans performed in the [facility], was performed utilizing techniques to minimize radiation dose exposure, including the use of iterative reconstruction and automated exposure control. Radiation dose length product (DLP) for this visit:  1497.37 mGy-cm IV Contrast:  100 mL of iohexol (OMNIPAQUE) Enteric Contrast:  Enteric contrast was not administered. FINDINGS: ABDOMEN LOWER CHEST: Moderate cardiomegaly. Pacemaker wires in the right cardiac chambers. Small right greater than left low-density pleural effusions. No significant consolidation. Patent visible bronchial tree. No biliary dilatation. LIVER/BILIARY TREE: The liver is 19 cm craniocaudad with suspected steatosis. GALLBLADDER: Appears completely decompressed. SPLEEN:  Unremarkable. PANCREAS:  Unremarkable. ADRENAL GLANDS:  Unremarkable. KIDNEYS/URETERS: No hydronephrosis. 6 mm nonobstructive right renal calculus.  5 x 4 cm cyst in the upper pole of the right kidney. One or more sharply circumscribed subcentimeter renal hypodensities are present, too small to accurately characterize, and statistically most likely benign findings. According to recent literature (Radiology 2019) no further workup of these findings is recommended. STOMACH AND BOWEL: No evidence of bowel obstruction or gross inflammatory process. Uncomplicated scattered colonic diverticula. APPENDIX:  No findings to suggest appendicitis. ABDOMINOPELVIC CAVITY: Trace nonorganized fluid. No pneumoperitoneum. No lymphadenopathy by size criteria. VESSELS: No aneurysm. Clipping of the right common iliac vein and infrarenal IVC. PELVIS REPRODUCTIVE ORGANS: The prostate is enlarged. URINARY BLADDER:  Unremarkable. ABDOMINAL WALL/INGUINAL REGIONS: Small fat-containing umbilical hernia. Mild anasarca. OSSEOUS STRUCTURES: Age-indeterminate severe central compression deformity of L4. No retropulsion. No other fracture identified. Multilevel degenerative changes in the visible spine. Impression: Age-indeterminate severe central compression deformity of L4. No retropulsion. Correlate to exclude focal tenderness. Otherwise no fracture suspected. Small pleural effusions, mild anasarca and trace nonorganized abdominal fluid suggestive of third spacing, fluid overload, or right cardiac failure. Correlate with clinical findings. Hepatomegaly. Nonobstructive right renal stone. Other findings, as per the body of the report. The major findings on CT head, cervical spine, CT abdomen and pelvis, were given to Dr. Oran Galeazzi, 6/7/2023 at 97 70 84. Workstation performed: [id number]     TRAUMA - CT spine cervical wo contrast    Result Date: 6/7/2023  Narrative: CT CERVICAL SPINE - WITHOUT CONTRAST INDICATION:   TRAUMA. COMPARISON:  None. TECHNIQUE:  CT examination of the cervical spine was performed without intravenous contrast.  Contiguous axial images were obtained.  Multiplanar 2D reformatted images were created from the source data. Radiation dose length product (DLP) for this visit:  491.63 mGy-cm . This examination, like all CT scans performed in the [facility], was performed utilizing techniques to minimize radiation dose exposure, including the use of iterative  reconstruction and automated exposure control. IMAGE QUALITY:  Diagnostic. FINDINGS: ALIGNMENT:  Normal alignment of the cervical spine. No subluxation. VERTEBRAE:  No fracture. DEGENERATIVE CHANGES: Bulky bridging anterior osteophytes suggestive of diffuse idiopathic skeletal hyperostosis. Multilevel discogenic disease and facet hypertrophy. No critical central stenosis. PREVERTEBRAL AND PARASPINAL SOFT TISSUES: Unremarkable THORACIC INLET: Incompletely visualized right pleural effusion. Small left apical blebs. Impression: No cervical spine fracture or traumatic malalignment. Chronic findings, as per the body of the report. Workstation performed: [id number]     TRAUMA - CT head wo contrast    Result Date: 6/7/2023  Narrative: CT BRAIN - WITHOUT CONTRAST INDICATION:   TRAUMA. Fall, head strike COMPARISON:  None. TECHNIQUE:  CT examination of the brain was performed. Multiplanar 2D reformatted images were created from the source data. Radiation dose length product (DLP) for this visit:  943.27 mGy-cm . This examination, like all CT scans performed in the [facility], was performed utilizing techniques to minimize radiation dose exposure, including the use of iterative  reconstruction and automated exposure control. IMAGE QUALITY:  Diagnostic. FINDINGS: PARENCHYMA: Decreased attenuation is noted in periventricular and subcortical white matter demonstrating an appearance that is statistically most likely to represent moderate microangiopathic change. No CT signs of acute infarction. No intracranial mass, mass effect or midline shift. No acute parenchymal hemorrhage.  VENTRICLES AND EXTRA-AXIAL SPACES: Ventricles and extra-axial CSF spaces are prominent commensurate with the degree of volume loss. No hydrocephalus. No acute extra-axial hemorrhage. VISUALIZED ORBITS: Status post bilateral cataract surgery, otherwise unremarkable. PARANASAL SINUSES: Mucoperiosteal thickening in the right sphenoid sinus. CALVARIUM AND EXTRACRANIAL SOFT TISSUES:  Normal.     Impression: No acute intracranial abnormality. Chronic intracranial changes, as above. Acute on chronic right sphenoid sinusitis. Workstation performed: [id number]     XR Trauma multiple (B/RA trauma bay ONLY)    Result Date: 6/7/2023  Narrative: TRAUMA SERIES INDICATION:  TRAUMA. COMPARISON:  None VIEWS:  XR TRAUMA MULTIPLE FINDINGS: CHEST: Sternotomy wires. Left atrial clip. Pacemaker chamber. Prosthetic aortic valve. Supine frontal view of the chest is obtained. Cardiomediastinal silhouette is within normal limits accounting for technique and patient positioning. Poor inspiration. Prominent vascular interstitial markings. . Blunted costophrenic angles. . No pneumothorax is seen on this supine film. Upright images are more sensitive to detect anterior pneumothoraces if relevant. No displaced fractures. 1 view reviewed. 1 view reviewed. Impression: No acute cardiopulmonary disease within limitations of supine imaging. Workstation performed: [id number]     EGD with Push Enteroscopy    Result Date: 5/26/2023  Narrative: Table formatting from the original result was not included. [address] [place] [address] Endoscopy [address] [facility]: [facility]/23 PHYSICIAN(S): Attending: Giovani Varma MD Fellow: Maikel Merrill MD INDICATION: Melena POST-OP DIAGNOSIS: See the impression below. PREPROCEDURE: Informed consent was obtained for the procedure, including sedation. Risks of perforation, hemorrhage, adverse drug reaction and aspiration were discussed. The patient was placed in the left lateral decubitus position.  Patient was explained about the risks and benefits of the procedure. Risks including but not limited to bleeding, infection, and perforation were explained in detail. Also explained about less than 100% sensitivity with the exam and other alternatives. PROCEDURE: EGD DETAILS OF PROCEDURE: Patient was taken to the procedure room where a time out was performed to confirm correct patient and correct procedure. The patient underwent monitored anesthesia care, which was administered by an anesthesia professional. The patient's blood pressure, ECG, heart rate, level of consciousness, oxygen, respirations and ETCO2 were monitored throughout the procedure. The scope was introduced through the mouth and advanced to the jejunum. Fluoroscopy was not used. The patient experienced no blood loss. The procedure was not difficult. The patient tolerated the procedure well. There were no apparent adverse events. 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 RECOMMENDATION: AVM bleed likely in the setting of supra-therapeutic INR Start diet Monitor H&H No further inpatient procedures unless patients re-bleeds Ok for Jen Mahmood MD     XR chest 1 view portable    Result Date: 5/25/2023  Narrative: CHEST INDICATION:   dyspnea. COMPARISON: 4/5/2023 EXAM PERFORMED/VIEWS:  XR CHEST PORTABLE FINDINGS: Median sternotomy wires are present.  Stable cardiomegaly is identified. Cardiomediastinal silhouette appears unremarkable. Mild pulmonary vascular congestion is present. Small bilateral pleural effusions are present. No pneumothorax is identified. Osseous structures appear within normal limits for patient age. Impression: Mild pulmonary vascular congestion and small bilateral pleural effusions Workstation performed: [id number]     US bedside procedure    Result Date: 5/25/2023  Narrative: [id number]. [id number]    Cardiac EP device report    Result Date: 5/24/2023  Narrative: MDT DUAL CHAMBER ICD/ NOT MRI CONDITIONAL DEVICE [facility]. BATTERY VOLTAGE ADEQUATE (1.9 YRS). AP-5%, -0.1%. ALL LEAD PARAMETERS WITHIN NORMAL LIMITS. NO NEW SIGNIFICANT HIGH RATE EPISODES. WAVELET TEMPLATE COLLECTED. PT TO SEE DR. RAMESH TODAY. NORMAL DEVICE FUNCTION. GV     EKG, Pathology, and Other Studies: I have personally reviewed pertinent reports. VTE Prophylaxis: Sequential compression device (Venodyne) , cleared for chem dvt ppx from a nsgy standpoint     Code Status: Level 1 - Full Code  Advance Directive and Living Will:      Power of :    POLST:      Counseling / Coordination of Care  I spent 20 minutes with the patient.

## 2023-06-22 NOTE — ASSESSMENT & PLAN NOTE
Patient reported acutely worsening low back pain that is very severe, 10/10, low back across, no radiation. No weakness of lower extremity. Xray obtained: Limited study without a true lateral image. Unchanged severe L4 compression deformity. Backspace Progressive loss of height of the L2 vertebral body may represent an acute fracture. Consider CT follow-up. CT L spine scan shows acute L1 fracture and chronic L4 fracture  Pain control with IV pain meds.    Neurosx consult

## 2023-06-22 NOTE — PROGRESS NOTES
[address] [facility]  Progress Note  Name: Anoop Mora  MRN: [id number]  Unit/Bed#: [facility] 238-76 I Date of Admission: 6/14/2023   Date of Service: 6/22/2023 I Hospital Day: 8    Assessment/Plan   * REMA (acute kidney injury) [facility])  Assessment & Plan  Patient downgraded from critical care, admitted for septic shock secondary to UTI leading to REMA requiring dialysis. · Likely UTI was the initial cause of REMA, exacerbated by concurrent metformin and sotolol use. · Combs has been removed  · monitoring bladder with q6h bladder scans  · CVVH discontinued 6/19  · tunneled dialysis catheter placed on 6/19, on intermittent hemodialysis  · Nephrology following   · Creatinine uptrending. Placed on gentle IVF due to strict NPO status  · Likely will need HD tomorrow    Acute low back pain  Assessment & Plan  Patient reported acutely worsening low back pain that is very severe, 10/10, low back across, no radiation. No weakness of lower extremity. Xray obtained: Limited study without a true lateral image. Unchanged severe L4 compression deformity. Backspace Progressive loss of height of the L2 vertebral body may represent an acute fracture. Consider CT follow-up. CT L spine scan shows acute L1 fracture and chronic L4 fracture  Pain control with IV pain meds. Neurosx consult     Dysphagia  Assessment & Plan  Patient had VBS with speech   Unfortunately he is aspiration with both solid and liquid. change to NPO, will change PO meds to IV and other routes. Speech recommends PEG tube. Patient will need alternate means of nutrition. Discussed with GI for possible PEG placement. They recommend trial of NGT first.     Septic shock ([address])  Assessment & Plan  2/2 MDR E.coli urosepsis. Briefly required ICU stay and pressor support. Currently off pressors. Abx day 6, s/p 4 days of cefepime, currently day 3 of ceftriaxone.    · UCx shows Ecoli and klebsiella both susceptible to ceftriaxone  · Completed ceftriaxone for a total abx duration of 7 days. Thrombocytopenia ([address])  Assessment & Plan  Admission platelets of 983. Steady downtrend this admission, saranya at 35. One instance of melena documented on 6/16 but at the time patient had platelete count in the 120k-130k. Unclear eitiology but likely 2/2 acute sepsis vs CVVH and filter clotting. Patient did receive heparin intermittently this admission however platelets have not responded to discontinuation of heparin products. Possibly cephalosporin induced, but patient is s/p 4 days of cefepime and then transitioned to ceftriaxone. · Likely 2/2 sepsis  · No acute signs of bleeding on exam  · S/p 1 U platelets 2/21 in preparation for IR permacath placement  · Trend CBC  · Holding pharmacologic AC/DVT prophylaxis  · uptrending platelets noted      Prolonged QT interval  Assessment & Plan  Noted on EKG. Avoid QTC prolonging medications. CHF (congestive heart failure) ([facility])  Assessment & Plan  Wt Readings from Last 3 Encounters:   06/22/23 (!) 140 kg (308 lb 3.3 oz)   06/07/23 136 kg (299 lb 13.2 oz)   06/02/23 130 kg (285 lb 8 oz)       Patient with a hx of HFpEF70%. · Patient euvolemic after CVVH  · Currently holding home torsemide in the setting of anuria. · permacath placed for intermittent HD      History of ventricular tachycardia  Assessment & Plan  S/p DUAL CHAMBER ICD/ NOT MRI CONDITIONAL . 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 · Warfarin held in the setting of thrombocytopenia  · Patient currently rate controlled, continuing to hold sotalol in the setting of anuria. Acute on chronic anemia  Assessment & Plan  Patient with chronic macrocytic anemia baseline 7-8. · Did have a hemoglobin of 6.9 and received 1uPRCs for this   · hgb 7 yesterdya.  transfuse 1 unit PRBC with HD  · Daily CBCs  · Goal Hgb >7, replete as necessary  · GI following of melena  · EGD completed today: no ulcers, only gastritis/duodenitis. · hgb stable 8.2  · Continue PPI  · Watch for s/s of GI bleed    Diabetes [facility])  Assessment & Plan  Lab Results   Component Value Date    HGBA1C 5.0 06/15/2023       Recent Labs     23  1041 23  1623 23  2125 23  0623   POCGLU 123 122 108 97       Blood Sugar Average: Last 72 hrs:  (P) 525.0879484063603422   Lab Results   Component Value Date    HGBA1C 5.0 06/15/2023       Recent Labs     23  1041 23  1623 23  0623   POCGLU 123 122 108 97       Blood Sugar Average: Last 72 hrs:  (P) 696.0546823424146010     Plan:  · Home regimen of metformin held in the setting of REMA and metabolic acidosis. · Continue with SSI QID               VTE Pharmacologic Prophylaxis:   Pharmacologic: Pharmacologic VTE Prophylaxis contraindicated due to thrombocytopenia, concern for acute bleed  Mechanical VTE Prophylaxis in Place: Yes    Patient Centered Rounds: I have performed bedside rounds with nursing staff today. Current Length of Stay: 8 day(s)    Current Patient Status: Inpatient   Certification Statement: The patient will continue to require additional inpatient hospital stay due to not medically stable    Discharge Plan: patient likely will need several more days    Code Status: Level 1 - Full Code      Subjective:     Patient examined at bedside. NPO due to dysphagia. No abdominal pain, or worsening dyspnea. No nausea, vomiting or other complains. Continues to have low back pain from L1 fractured    Objective:     Vitals:   Temp (24hrs), Av °F (36.7 °C), Min:97.4 °F (36.3 °C), Max:98.5 °F (36.9 °C)    Temp:  [97.4 °F (36.3 °C)-98.5 °F (36.9 °C)] 98.5 °F (36.9 °C)  HR:  [81-93] 92  Resp:  [16-18] 18  BP: (109-124)/(53-66) 120/65  SpO2:  [90 %-100 %] 95 %  Body mass index is 40.66 kg/m². Input and Output Summary (last 24 hours):        Intake/Output Summary (Last 24 hours) at 2023 0915  Last data filed at 6/22/2023 0827  Gross per 24 hour   Intake 842.5 ml   Output 613 ml   Net 229.5 ml       Physical Exam:     Physical Exam  Vitals and nursing note reviewed. Constitutional:       General: He is not in acute distress. Appearance: Normal appearance. He is ill-appearing. HENT:      Head: Normocephalic and atraumatic. Right Ear: External ear normal.      Left Ear: External ear normal.      Nose: Nose normal.   Eyes:      Extraocular Movements: Extraocular movements intact. Pulmonary:      Effort: Pulmonary effort is normal.   Musculoskeletal:      Cervical back: Normal range of motion. Neurological:      Mental Status: He is alert. Mental status is at baseline. Psychiatric:         Mood and Affect: Mood normal.         Additional Data:     Labs:    Results from last 7 days   Lab Units 06/22/23  0355 06/21/23  0517 06/20/23  0455 06/16/23  0550 06/16/23  0103   WBC Thousand/uL 9.44   < > 10.96*   < > 14.99*   HEMOGLOBIN g/dL 8.2*   < > 7.0*   < > 7.9*   HEMATOCRIT % 26.5*   < > 22.9*   < > 25.4*   PLATELETS Thousands/uL 92*   < > 67*   < > 138*   BANDS PCT %  --   --   --   --  15*   NEUTROS PCT %  --   --  74   < >  --    LYMPHS PCT %  --   --  13*   < >  --    LYMPHO PCT %  --   --   --   --  1*   MONOS PCT %  --   --  9   < >  --    MONO PCT %  --   --   --   --  0*   EOS PCT %  --   --  2   < > 0    < > = values in this interval not displayed.      Results from last 7 days   Lab Units 06/22/23  0355 06/20/23  0455 06/19/23  0550   SODIUM mmol/L 139   < > 138   POTASSIUM mmol/L 4.0   < > 4.0   CHLORIDE mmol/L 106   < > 108   CO2 mmol/L 30   < > 27   BUN mg/dL 18   < > 12   CREATININE mg/dL 1.90*   < > 1.05   ANION GAP mmol/L 3   < > 3*   CALCIUM mg/dL 8.1*   < > 8.8   ALBUMIN g/dL  --   --  2.6*   TOTAL BILIRUBIN mg/dL  --   --  1.45*   ALK PHOS U/L  --   --  79   ALT U/L  --   --  23   AST U/L  --   --  39   GLUCOSE RANDOM mg/dL 98   < > 112    < > = values in this interval not displayed. Results from last 7 days   Lab Units 06/22/23  0355   INR  1.74*     Results from last 7 days   Lab Units 06/22/23  0623 06/21/23  2125 06/21/23  1623 06/21/23  1041 06/21/23  0604 06/20/23  2100 06/20/23  1647 06/20/23  1606 06/20/23  1023 06/20/23  0623 06/19/23  2206 06/19/23  1705   POC GLUCOSE mg/dl 97 108 122 123 123 120 118 147* 140 110 144* 121         Results from last 7 days   Lab Units 06/17/23  1217 06/17/23  0535 06/17/23  0013 06/16/23  2033   LACTIC ACID mmol/L 1.6 1.8 2.5* 3.6*             Last 24 Hours Medication List:   Current Facility-Administered Medications   Medication Dose Route Frequency Provider Last Rate   • bisacodyl  10 mg Rectal Daily Emeterio Cifuentes MD     • diphenhydramine, lidocaine, Al/Mg hydroxide, simethicone  10 mL Swish & Spit Q6H PRN Anjelica Bautista MD     • HYDROmorphone  0.5 mg Intravenous Q4H PRN Romy Anguiano MD     • HYDROmorphone  0.2 mg Intravenous Q4H PRN Romy Anguiano MD     • insulin lispro  2-12 Units Subcutaneous 4x Daily (with meals and at bedtime) Anjelica Bautista MD     • lidocaine  1 patch Topical Daily Anjelica Bautista MD     • lidocaine   Topical 4x Daily PRN Anjelica Bautista MD     • pantoprazole  40 mg Intravenous Q24H [facility] Romy Anguiano MD     • sodium chloride  50 mL/hr Intravenous Continuous Romy Anguiano MD 50 mL/hr (06/21/23 1637)   • trimethobenzamide  200 mg Intramuscular Q6H PRN Anjelica Bautista MD       Facility-Administered Medications Ordered in Other Encounters   Medication Dose Route Frequency Provider Last Rate   • sodium chloride  75 mL/hr Intravenous Continuous Mello Fuentes MD Stopped (05/26/23 1426)        Today, Patient Was Seen By: Romy Anguiano MD    ** Please Note: Dictation voice to text software may have been used in the creation of this document.  **

## 2023-06-22 NOTE — ASSESSMENT & PLAN NOTE
Acute L1 vertebral body fracture   - admitted to the hospital with MDR E.coli bacteremia with septic shock and renal failure requiring dialysis and vasopressor support in the ICU   - has since been stabilized and managed on the med surg floor  - reports LBP x ~3weeks since a mechanical fall with is walker on carpet   - denies any LE radiculopathy, LE weakness, numbness, saddle anesthesia, BBI, urinary retention     Imaging:   · 6/21 CT L-spine: Oblique, nondisplaced fracture involving the L1 vertebral body without significant vertebral height loss or retropulsion. Chronic L4 compression fracture with moderate to severe anterior wedging. Multilevel degenerative disc disease with mild to moderate central canal narrowing at the L3-4 level. Plan:   · Continue to closely monitor neuro exam  · Frequent neurochecks per primary team  · Maintain normotensive BP goals, MAP > 65  · No acute neurosurgical intervention indicated at this time  · Case and imaging reviewed  · Mild, acute vertebral body fracture noted at L1 with no retropulsion and minimal loss of height. Noted chronic L4 compression fracture with significant loss of height. · Recommend conservative management at this time  · LSO brace to be worn when OOB or HOB > 45 deg   · please obtain upright lumbar x-rays   · continue pain control per primary team  · PT/OT  · DVT ppx: sparkle Pittman for chem dvt ppx from a nsgy standpoint   · Medical management per primary team   · Social work following for assistance with dispo once medically cleared     Neurosurgery will follow from the periphery and review upright imaging once completed. Please reach out with any further questions or concerns.

## 2023-06-22 NOTE — ASSESSMENT & PLAN NOTE
Patient with chronic macrocytic anemia baseline 7-8. · Did have a hemoglobin of 6.9 and received 1uPRCs for this   · hgb 7 yesterdya. transfuse 1 unit PRBC with HD  · Daily CBCs  · Goal Hgb >7, replete as necessary  · GI following of melena  · EGD completed today: no ulcers, only gastritis/duodenitis.    · hgb stable 8.2  · Continue PPI  · Watch for s/s of GI bleed

## 2023-06-22 NOTE — SPEECH THERAPY NOTE
Speech Language/Pathology    Speech/Language Pathology Progress Note    Patient Name: Dafne Cardoza  HKOTG'K Date: 6/22/2023     Problem List  Principal Problem:    REMA (acute kidney injury) ([address])  Active Problems:    Diabetes ([address])    Acute on chronic anemia    Atrial fibrillation (HCC)    History of ventricular tachycardia    CHF (congestive heart failure) (HCC)    Prolonged QT interval    Thrombocytopenia (HCC)    Septic shock (HCC)    Dysphagia    Acute low back pain       Past Medical History  Past Medical History:   Diagnosis Date   • Anemia    • Arthritis    • Atrial fibrillation ([address])    • Basal cell carcinoma 03/22/2022    Tip of Nose   • CHF (congestive heart failure) (HCC)    • Diabetes mellitus (HCC)     Niddm   • DVT (deep vein thrombosis) in pregnancy 1966    not in pregnancy   • DVT (deep venous thrombosis) ([address]) 1966   • Dyslipidemia    • Encephalopathy acute 11/4/2022   • GERD (gastroesophageal reflux disease)    • Hyperlipidemia    • Hypertension    • Hypomagnesemia 10/19/2022   • Irregular heart beat     Afib   • Morbid obesity due to excess calories ([address])     Resolved 9/2/2014    • Pulmonary embolism (HCC)    • Sepsis ([address])    • Squamous cell skin cancer 07/30/2020    Left posterior scalp   • Visual impairment         Past Surgical History  Past Surgical History:   Procedure Laterality Date   • AORTIC VALVE REPLACEMENT     • CARDIAC DEFIBRILLATOR PLACEMENT  04/2014   • CARDIAC SURGERY  02/2014    AVR   • COLONOSCOPY     • INSERT / REPLACE / REMOVE PACEMAKER     • IR ASPIRATION BAKERS CYST  3/8/2023   • IR CHOLECYSTOSTOMY TUBE CHECK/CHANGE/REPOSITION/REINSERTION/UPSIZE  10/13/2022   • IR CHOLECYSTOSTOMY TUBE CHECK/CHANGE/REPOSITION/REINSERTION/UPSIZE  10/19/2022   • IR CHOLECYSTOSTOMY TUBE CHECK/CHANGE/REPOSITION/REINSERTION/UPSIZE  10/27/2022   • IR CHOLECYSTOSTOMY TUBE CHECK/CHANGE/REPOSITION/REINSERTION/UPSIZE  11/7/2022   • IR CHOLECYSTOSTOMY TUBE CHECK/CHANGE/REPOSITION/REINSERTION/UPSIZE 11/10/2022   • IR CHOLECYSTOSTOMY TUBE CHECK/CHANGE/REPOSITION/REINSERTION/UPSIZE  12/1/2022   • IR CHOLECYSTOSTOMY TUBE CHECK/CHANGE/REPOSITION/REINSERTION/UPSIZE  1/6/2023   • IR CHOLECYSTOSTOMY TUBE CHECK/CHANGE/REPOSITION/REINSERTION/UPSIZE  1/24/2023   • IR CHOLECYSTOSTOMY TUBE CHECK/CHANGE/REPOSITION/REINSERTION/UPSIZE  3/15/2023   • IR CHOLECYSTOSTOMY TUBE PLACEMENT  9/1/2022   • IR DRAINAGE TUBE CHECK AND/OR REMOVAL  3/22/2023   • IR DRAINAGE TUBE CHECK AND/OR REMOVAL  4/10/2023   • IR DRAINAGE TUBE PLACEMENT  4/6/2023   • IR TUNNELED DIALYSIS CATHETER PLACEMENT  6/19/2023   • JOINT REPLACEMENT Left     LTKR   • MOHS SURGERY  07/30/2020    Left posterior scalp, Dr. Virginia Saeed   • MOHS SURGERY  04/18/2022    BCC Tip of Nose- Dr. Virginia Saeed   • Shearon Pun DIVJ&STRIP LONG SAPH SAPHFEM Benjamen Glow Right 8/17/2018    Procedure: LEG PERFORATED INJECTION SCLEROTHERAPY;  Surgeon: Niko Ramirez MD;  Location: [facility]  MAIN OR;  Service: Vascular   • REPLACEMENT TOTAL KNEE Right    • TOTAL KNEE ARTHROPLASTY Left    • VASCULAR SURGERY     • VENA CAVA FILTER PLACEMENT      Interruption inferior vena cava, [place] filter, placement   • WISDOM TOOTH EXTRACTION           Subjective:  Pt awake and alert with wife at bedside. Pt is positioned upright in bed. Objective:  Pt seen for f/u dysphagia therapy. Pt is assessed with ice chips x3. SLP feeds pt ice chips one at a time. Oral acceptance and labial seal are adequate. Transfer and swallow initiation appear min delayed. Pt presents with cough response and throat clear with regurgitation of material into oral cavity (most likely from retention). Material is cleared via suction. Patient appears to fatigue and refuses additional ice chips. Assessment:  Pt presents with cough response following each trial of a single ice chip. Plan/Recommendations:  Continue NPO with ice chips for comfort. Consider alternate means of nutrition.  Continue ST to trial diet upgrades as able and determine if/when repeat VBS is appropriate.

## 2023-06-22 NOTE — PHYSICAL THERAPY NOTE
Physical Therapy Cancellation Note         06/22/23 1145   PT Last Visit   PT Visit Date 06/22/23   Note Type   Note Type Cancelled Session   Cancel Reasons Medical status  (patient with L1 fracture- pending nsx consult.  will defer and continue to follow on caseload)       Constance Bright PT, DPT

## 2023-06-22 NOTE — ASSESSMENT & PLAN NOTE
Wt Readings from Last 3 Encounters:   06/22/23 (!) 140 kg (308 lb 3.3 oz)   06/07/23 136 kg (299 lb 13.2 oz)   06/02/23 130 kg (285 lb 8 oz)       Patient with a hx of HFpEF70%. · Patient euvolemic after CVVH  · Currently holding home torsemide in the setting of anuria.    · permacath placed for intermittent HD

## 2023-06-22 NOTE — PLAN OF CARE
PT s/p VBS, aspiration with both solid and liquids, was made NPO, will continue to monitor care of plan. Will need to consider initiating TF if no improvement. Recommend [email or web address], advance by 10mL every 6hrs to goal of 65mL/hr, water flushes 50mL every 4hrs provides total of 2340cal, 100g pro, 1486mL. Will continue to monitor labs, tolerance, weight and need for any further adjustments. Problem: Nutrition/Hydration-ADULT  Goal: Nutrient/Hydration intake appropriate for improving, restoring or maintaining nutritional needs  Description: Monitor and assess patient's nutrition/hydration status for malnutrition. Collaborate with interdisciplinary team and initiate plan and interventions as ordered. Monitor patient's weight and dietary intake as ordered or per policy. Utilize nutrition screening tool and intervene as necessary. Determine patient's food preferences and provide high-protein, high-caloric foods as appropriate.      INTERVENTIONS:  - Monitor oral intake, urinary output, labs, and treatment plans  - Assess nutrition and hydration status and recommend course of action  - Evaluate amount of meals eaten  - Assist patient with eating if necessary   - Allow adequate time for meals  - Recommend/ encourage appropriate diets, oral nutritional supplements, and vitamin/mineral supplements  - Order, calculate, and assess calorie counts as needed  - Recommend, monitor, and adjust tube feedings and TPN/PPN based on assessed needs  - Assess need for intravenous fluids  - Provide specific nutrition/hydration education as appropriate  - Include patient/family/caregiver in decisions related to nutrition  Outcome: Not Progressing

## 2023-06-22 NOTE — ASSESSMENT & PLAN NOTE
Lab Results   Component Value Date    HGBA1C 5.0 06/15/2023       Recent Labs     06/21/23  1041 06/21/23  1623 06/21/23  2125 06/22/23  0623   POCGLU 123 122 108 97       Blood Sugar Average: Last 72 hrs:  (P) 394.3901964074392651   Lab Results   Component Value Date    HGBA1C 5.0 06/15/2023       Recent Labs     06/21/23  1041 06/21/23  1623 06/21/23 2125 06/22/23  0623   POCGLU 123 122 108 97       Blood Sugar Average: Last 72 hrs:  (P) 388.9636814018481884     Plan:  · Home regimen of metformin held in the setting of REMA and metabolic acidosis.    · Continue with SSI QID

## 2023-06-22 NOTE — ASSESSMENT & PLAN NOTE
Patient downgraded from critical care, admitted for septic shock secondary to UTI leading to REMA requiring dialysis. · Likely UTI was the initial cause of REMA, exacerbated by concurrent metformin and sotolol use. · Combs has been removed  · monitoring bladder with q6h bladder scans  · CVVH discontinued 6/19  · tunneled dialysis catheter placed on 6/19, on intermittent hemodialysis  · Nephrology following   · Creatinine uptrending.  Placed on gentle IVF due to strict NPO status  · Likely will need HD tomorrow

## 2023-06-22 NOTE — PLAN OF CARE
Problem: METABOLIC, FLUID AND ELECTROLYTES - ADULT  Goal: Electrolytes maintained within normal limits  Description: INTERVENTIONS:  - Monitor labs and assess patient for signs and symptoms of electrolyte imbalances  - Administer electrolyte replacement as ordered  - Monitor response to electrolyte replacements, including repeat lab results as appropriate  - Instruct patient on fluid and nutrition as appropriate  Outcome: Progressing  Goal: Fluid balance maintained  Description: INTERVENTIONS:  - Monitor labs   - Monitor I/O and WT  - Instruct patient on fluid and nutrition as appropriate  - Assess for signs & symptoms of volume excess or deficit  Outcome: Progressing  Goal: Glucose maintained within target range  Description: INTERVENTIONS:  - Monitor Blood Glucose as ordered  - Assess for signs and symptoms of hyperglycemia and hypoglycemia  - Administer ordered medications to maintain glucose within target range  - Assess nutritional intake and initiate nutrition service referral as needed  Outcome: Progressing     Problem: MOBILITY - ADULT  Goal: Maintain or return to baseline ADL function  Description: INTERVENTIONS:  -  Assess patient's ability to carry out ADLs; assess patient's baseline for ADL function and identify physical deficits which impact ability to perform ADLs (bathing, care of mouth/teeth, toileting, grooming, dressing, etc.)  - Assess/evaluate cause of self-care deficits   - Assess range of motion  - Assess patient's mobility; develop plan if impaired  - Assess patient's need for assistive devices and provide as appropriate  - Encourage maximum independence but intervene and supervise when necessary  - Involve family in performance of ADLs  - Assess for home care needs following discharge   - Consider OT consult to assist with ADL evaluation and planning for discharge  - Provide patient education as appropriate  Outcome: Progressing  Goal: Maintains/Returns to pre admission functional level  Description: INTERVENTIONS:  - Perform BMAT or MOVE assessment daily.   - Set and communicate daily mobility goal to care team and patient/family/caregiver. - Collaborate with rehabilitation services on mobility goals if consulted  - Perform Range of Motion  times a day. - Reposition patient every  hours. - Dangle patient  times a day  - Stand patient  times a day  - Ambulate patient  times a day  - Out of bed to chair  times a day   - Out of bed for meals  times a day  - Out of bed for toileting  - Record patient progress and toleration of activity level   Outcome: Progressing     Problem: Prexisting or High Potential for Compromised Skin Integrity  Goal: Skin integrity is maintained or improved  Description: INTERVENTIONS:  - Identify patients at risk for skin breakdown  - Assess and monitor skin integrity  - Assess and monitor nutrition and hydration status  - Monitor labs   - Assess for incontinence   - Turn and reposition patient  - Assist with mobility/ambulation  - Relieve pressure over bony prominences  - Avoid friction and shearing  - Provide appropriate hygiene as needed including keeping skin clean and dry  - Evaluate need for skin moisturizer/barrier cream  - Collaborate with interdisciplinary team   - Patient/family teaching  - Consider wound care consult   Outcome: Progressing     Problem: Nutrition/Hydration-ADULT  Goal: Nutrient/Hydration intake appropriate for improving, restoring or maintaining nutritional needs  Description: Monitor and assess patient's nutrition/hydration status for malnutrition. Collaborate with interdisciplinary team and initiate plan and interventions as ordered. Monitor patient's weight and dietary intake as ordered or per policy. Utilize nutrition screening tool and intervene as necessary. Determine patient's food preferences and provide high-protein, high-caloric foods as appropriate.      INTERVENTIONS:  - Monitor oral intake, urinary output, labs, and treatment plans  - Assess nutrition and hydration status and recommend course of action  - Evaluate amount of meals eaten  - Assist patient with eating if necessary   - Allow adequate time for meals  - Recommend/ encourage appropriate diets, oral nutritional supplements, and vitamin/mineral supplements  - Order, calculate, and assess calorie counts as needed  - Recommend, monitor, and adjust tube feedings and TPN/PPN based on assessed needs  - Assess need for intravenous fluids  - Provide specific nutrition/hydration education as appropriate  - Include patient/family/caregiver in decisions related to nutrition  Outcome: Progressing     Problem: PAIN - ADULT  Goal: Verbalizes/displays adequate comfort level or baseline comfort level  Description: Interventions:  - Encourage patient to monitor pain and request assistance  - Assess pain using appropriate pain scale  - Administer analgesics based on type and severity of pain and evaluate response  - Implement non-pharmacological measures as appropriate and evaluate response  - Consider cultural and social influences on pain and pain management  - Notify physician/advanced practitioner if interventions unsuccessful or patient reports new pain  Outcome: Progressing     Problem: INFECTION - ADULT  Goal: Absence or prevention of progression during hospitalization  Description: INTERVENTIONS:  - Assess and monitor for signs and symptoms of infection  - Monitor lab/diagnostic results  - Monitor all insertion sites, i.e. indwelling lines, tubes, and drains  - Monitor endotracheal if appropriate and nasal secretions for changes in amount and color  - [place] appropriate cooling/warming therapies per order  - Administer medications as ordered  - Instruct and encourage patient and family to use good hand hygiene technique  - Identify and instruct in appropriate isolation precautions for identified infection/condition  Outcome: Progressing     Problem: SAFETY ADULT  Goal: Maintain or return to baseline ADL function  Description: INTERVENTIONS:  -  Assess patient's ability to carry out ADLs; assess patient's baseline for ADL function and identify physical deficits which impact ability to perform ADLs (bathing, care of mouth/teeth, toileting, grooming, dressing, etc.)  - Assess/evaluate cause of self-care deficits   - Assess range of motion  - Assess patient's mobility; develop plan if impaired  - Assess patient's need for assistive devices and provide as appropriate  - Encourage maximum independence but intervene and supervise when necessary  - Involve family in performance of ADLs  - Assess for home care needs following discharge   - Consider OT consult to assist with ADL evaluation and planning for discharge  - Provide patient education as appropriate  Outcome: Progressing  Goal: Maintains/Returns to pre admission functional level  Description: INTERVENTIONS:  - Perform BMAT or MOVE assessment daily.   - Set and communicate daily mobility goal to care team and patient/family/caregiver. - Collaborate with rehabilitation services on mobility goals if consulted  - Perform Range of Motion  times a day. - Reposition patient every  hours.   - Dangle patient  times a day  - Stand patient  times a day  - Ambulate patient  times a day  - Out of bed to chair  times a day   - Out of bed for meals  times a day  - Out of bed for toileting  - Record patient progress and toleration of activity level   Outcome: Progressing  Goal: Patient will remain free of falls  Description: INTERVENTIONS:  - Educate patient/family on patient safety including physical limitations  - Instruct patient to call for assistance with activity   - Consult OT/PT to assist with strengthening/mobility   - Keep Call bell within reach  - Keep bed low and locked with side rails adjusted as appropriate  - Keep care items and personal belongings within reach  - Initiate and maintain comfort rounds  - Make Fall Risk Sign visible to staff  - Offer Toileting every  Hours, in advance of need  - Initiate/Maintain alarm  - Obtain necessary fall risk management equipment  - Apply yellow socks and bracelet for high fall risk patients  - Consider moving patient to room near nurses station  Outcome: Progressing     Problem: DISCHARGE PLANNING  Goal: Discharge to home or other facility with appropriate resources  Description: INTERVENTIONS:  - Identify barriers to discharge w/patient and caregiver  - Arrange for needed discharge resources and transportation as appropriate  - Identify discharge learning needs (meds, wound care, etc.)  - Arrange for interpretive services to assist at discharge as needed  - Refer to Case Management Department for coordinating discharge planning if the patient needs post-hospital services based on physician/advanced practitioner order or complex needs related to functional status, cognitive ability, or social support system  Outcome: Progressing     Problem: Knowledge Deficit  Goal: Patient/family/caregiver demonstrates understanding of disease process, treatment plan, medications, and discharge instructions  Description: Complete learning assessment and assess knowledge base.   Interventions:  - Provide teaching at level of understanding  - Provide teaching via preferred learning methods  Outcome: Progressing     Problem: RESPIRATORY - ADULT  Goal: Achieves optimal ventilation and oxygenation  Description: INTERVENTIONS:  - Assess for changes in respiratory status  - Assess for changes in mentation and behavior  - Position to facilitate oxygenation and minimize respiratory effort  - Oxygen administered by appropriate delivery if ordered  - Initiate smoking cessation education as indicated  - Encourage broncho-pulmonary hygiene including cough, deep breathe, Incentive Spirometry  - Assess the need for suctioning and aspirate as needed  - Assess and instruct to report SOB or any respiratory difficulty  - Respiratory Therapy support as indicated  Outcome: Progressing     Problem: GENITOURINARY - ADULT  Goal: Maintains or returns to baseline urinary function  Description: INTERVENTIONS:  - Assess urinary function  - Encourage oral fluids to ensure adequate hydration if ordered  - Administer IV fluids as ordered to ensure adequate hydration  - Administer ordered medications as needed  - Offer frequent toileting  - Follow urinary retention protocol if ordered  Outcome: Progressing     Problem: MUSCULOSKELETAL - ADULT  Goal: Maintain or return mobility to safest level of function  Description: INTERVENTIONS:  - Assess patient's ability to carry out ADLs; assess patient's baseline for ADL function and identify physical deficits which impact ability to perform ADLs (bathing, care of mouth/teeth, toileting, grooming, dressing, etc.)  - Assess/evaluate cause of self-care deficits   - Assess range of motion  - Assess patient's mobility  - Assess patient's need for assistive devices and provide as appropriate  - Encourage maximum independence but intervene and supervise when necessary  - Involve family in performance of ADLs  - Assess for home care needs following discharge   - Consider OT consult to assist with ADL evaluation and planning for discharge  - Provide patient education as appropriate  Outcome: Progressing

## 2023-06-22 NOTE — PROGRESS NOTES
NEPHROLOGY PROGRESS NOTE   Ines Beltran 81 y.o. male MRN: [id number]  Unit/Bed#: [facility] 504-01 Encounter: [id number]      ASSESSMENT & PLAN:  1 oligoanuric acute kidney injury complicated with severe hyperkalemia and metabolic acidosis. Per previous note suspected secondary to septic ATN plus minus diuretic use plus minus contrast oxygen nephropathy. Status post emergent IHD on 6/14 for hyperkalemia. He was then transitioned to CRRT, CRRT discontinued on 6/19  Last dialysis treatment on 6/20  Labs reviewed this morning, creatinine up to 1.9, urine output increasing, will hold dialysis today and reassess tomorrow for recovery  Follow labs in the morning, monitor ins and outs. Anticipate dialysis treatment tomorrow    2 shock, suspected mixed septic shock, acidosis, hypovolemic, resolved. Currently off inotropic/pressor support  Keep MAP more than 65 mmHg    3 septic shock with E. coli urosepsis, antibiotics per primary team, on ceftriaxone for a total of 7 days    4. hypophosphatemia, improved after replacement, follow labs in the morning    5 anemia, monitor H&H suspect a component of acute blood loss, possible GI bleeding status post transfusion, hemoglobin low but is stable at 8.2 today    6 severe acidosis, lactic acidosis, concern for metformin toxicity, resolved    7 chronic lower extremity wounds and lymphedema    #8  CHF, A-fib, V. tach status post ICD, diuretics currently on hold, monitor volume status closely    9 Access, right IJ PermCath placed by IR 6/19    Plan and recommendation was discussed with internal medicine attending, she agreed with the plan, will hold dialysis today, monitor ins and outs, follow labs in the morning to assess for renal recovery, suspect for HD treatment tomorrow a.m., continue gentle IV fluids while patient n.p.o., if diet restarted please stop IV fluids    SUBJECTIVE:  Patient seen and examined, patient's wife and daughter at bedside.   In general feeling about the same, denies significant chest pain or shortness of breath, no nausea, no vomiting.   Continues complaining of lower back pain    OBJECTIVE:  Current Weight: Weight - Scale: (!) 140 kg (308 lb 3.3 oz)  Vitals:    06/22/23 0722   BP: 120/65   Pulse: 92   Resp:    Temp: 98.5 °F (36.9 °C)   SpO2: 95%       Intake/Output Summary (Last 24 hours) at 6/22/2023 1059  Last data filed at 6/22/2023 0827  Gross per 24 hour   Intake 842.5 ml   Output 613 ml   Net 229.5 ml       General: Chronically ill, conscious, cooperative, in not acute distress  Eyes: conjunctivae pale, anicteric sclerae  ENT: lips and mucous membranes moist, oxygen nasal cannula  Neck: supple, no JVD  Chest: clear breath sounds bilateral, no crackles, ronchus or wheezings  CVS: distinct S1 & S2, normal rate, regular rhythm  Abdomen: Obese, non-tender, non-distended, normoactive bowel sounds  Extremities: + edema of both legs  Skin: Chronic skin changes in lower extremities  Neuro: awake, alert, oriented  Right IJ PermCath in place        Medications:    Current Facility-Administered Medications:   •  bisacodyl (DULCOLAX) rectal suppository 10 mg, 10 mg, Rectal, Daily, Emeterio Cifuentes MD  •  diphenhydramine, lidocaine, Al/Mg hydroxide, simethicone (Magic Mouthwash) oral solution 10 mL, 10 mL, Swish & Spit, Q6H PRN, Lanre Bautista MD  •  HYDROmorphone (DILAUDID) injection 0.5 mg, 0.5 mg, Intravenous, Q4H PRN, Keturah Walker MD, 0.5 mg at 06/22/23 1008  •  HYDROmorphone HCl (DILAUDID) injection 0.2 mg, 0.2 mg, Intravenous, Q4H PRN, Keturah Walker MD  •  insulin lispro (HumaLOG) 100 units/mL subcutaneous injection 2-12 Units, 2-12 Units, Subcutaneous, 4x Daily (with meals and at bedtime), 2 Units at 06/18/23 2155 **AND** Fingerstick Glucose (POCT), , , 4x Daily AC and at bedtime, Lanre Bautista MD  •  lidocaine (LIDODERM) 5 % patch 1 patch, 1 patch, Topical, Daily, Lanre Bautista MD, 1 patch at 06/22/23 1010  •  lidocaine (LMX) 4 % cream, , Topical, 4x Daily PRN, Vaughn Bautista MD, Given at 06/18/23 1640  •  pantoprazole (PROTONIX) injection 40 mg, 40 mg, Intravenous, Q24H [address], Emeterio Cifuentes MD, 40 mg at 06/22/23 1006  •  sodium chloride 0.9 % infusion, 50 mL/hr, Intravenous, Continuous, Sandra Trujillo MD, Last Rate: 50 mL/hr at 06/21/23 1637, 50 mL/hr at 06/21/23 1637  •  trimethobenzamide (TIGAN) IM injection 200 mg, 200 mg, Intramuscular, Q6H PRN, Vaughn Bautista MD, 200 mg at 06/18/23 1640    Facility-Administered Medications Ordered in Other Encounters:   •  sodium chloride 0.9 % infusion, 75 mL/hr, Intravenous, Continuous, Lalit Magallon MD, Stopped at 05/26/23 1426    Invasive Devices:        Lab Results:   Results from last 7 days   Lab Units 06/22/23  0355 06/21/23  0517 06/20/23  0455 06/19/23  1446 06/19/23  0550 06/16/23  0103 06/15/23  2010 06/15/23  1949   WBC Thousand/uL 9.44 11.47* 10.96*  --  12.64*   < > 14.86*  --    HEMOGLOBIN g/dL 8.2* 8.2* 7.0*  --  7.3*   < > 8.7*  --    I STAT HEMOGLOBIN g/dl  --   --   --   --   --   --   --  8.5*   HEMATOCRIT % 26.5* 26.1* 22.9*  --  24.6*   < > 28.3*  --    HEMATOCRIT, ISTAT %  --   --   --   --   --   --   --  25*   PLATELETS Thousands/uL 92* 69* 67*   < > 35*   < > 171  --    SODIUM mmol/L 139 138 138  --  138   < > 132*  --    POTASSIUM mmol/L 4.0 3.9 3.9  --  4.0   < > 5.4*  --    CHLORIDE mmol/L 106 106 108  --  108   < > 100  --    CO2 mmol/L 30 27 26  --  27   < > 18*  --    CO2, I-STAT mmol/L  --   --   --   --   --   --   --  23   BUN mg/dL 18 14 18  --  12   < > 34*  --    CREATININE mg/dL 1.90* 1.61* 1.83*  --  1.05   < > 3.89*  --    CALCIUM mg/dL 8.1* 8.1* 8.7  --  8.8   < > 9.1  --    MAGNESIUM mg/dL  --  1.8 1.9  --  2.0   < > 2.3  --    PHOSPHORUS mg/dL  --  2.3 2.7  --  1.8*   < > 4.5*  --    ALK PHOS U/L  --   --   --   --  79  --  101  --    ALT U/L  --   --   --   --  23  --  30  --    AST U/L  --   --   --   --  39  --  58*  --    GLUCOSE, ISTAT mg/dl  --   --   --   --   --   --   --  194* < > = values in this interval not displayed. Previous work up:  See previous notes      Portions of the record may have been created with voice recognition software. Occasional wrong word or "sound a like" substitutions may have occurred due to the inherent limitations of voice recognition software. Read the chart carefully and recognize, using context, where substitutions have occurred. If you have any questions, please contact the dictating provider.

## 2023-06-22 NOTE — OCCUPATIONAL THERAPY NOTE
Occupational Therapy Cancel Note        Patient Name: Shauna LEIJALNS'J Date: 6/22/2023 06/22/23 1415   OT Last Visit   OT Visit Date 06/22/23   Note Type   Note Type Cancelled Session   Cancel Reasons Medical status  (pt with new L1 fracture, awaiting nsx consult.  OT will continue to follow.)     Chacho Daniels, OTR/L

## 2023-06-22 NOTE — ASSESSMENT & PLAN NOTE
Patient had VBS with speech   Unfortunately he is aspiration with both solid and liquid. change to NPO, will change PO meds to IV and other routes. Speech recommends PEG tube. Patient will need alternate means of nutrition. Discussed with GI for possible PEG placement.  They recommend trial of NGT first.

## 2023-06-22 NOTE — ASSESSMENT & PLAN NOTE
Admission platelets of 744. Steady downtrend this admission, saranya at 35. One instance of melena documented on 6/16 but at the time patient had platelete count in the 120k-130k. Unclear eitiology but likely 2/2 acute sepsis vs CVVH and filter clotting. Patient did receive heparin intermittently this admission however platelets have not responded to discontinuation of heparin products. Possibly cephalosporin induced, but patient is s/p 4 days of cefepime and then transitioned to ceftriaxone.    · Likely 2/2 sepsis  · No acute signs of bleeding on exam  · S/p 1 U platelets 9/84 in preparation for IR permacath placement  · Trend CBC  · Holding pharmacologic AC/DVT prophylaxis  · uptrending platelets noted

## 2023-06-23 NOTE — ASSESSMENT & PLAN NOTE
Patient downgraded from critical care, admitted for septic shock secondary to UTI leading to REMA requiring dialysis. · Likely UTI was the initial cause of REMA, exacerbated by concurrent metformin and sotolol use. · Combs has been removed  · monitoring bladder with q6h bladder scans  · CVVH discontinued 6/19  · tunneled dialysis catheter placed on 6/19, on intermittent hemodialysis  · Nephrology following   · Patient receiving dialysis today.   · Plan to resume home diuretics today

## 2023-06-23 NOTE — PROGRESS NOTES
NEPHROLOGY PROGRESS NOTE   Cherelle Mederos 81 y.o. male MRN: [id number]  Unit/Bed#: [facility] 504-01 Encounter: [id number]      ASSESSMENT & PLAN:  1 oligoanuric acute kidney injury complicated with severe hyperkalemia and metabolic acidosis. Per previous note suspected secondary to septic ATN plus minus diuretic use plus minus contrast oxygen nephropathy. Status post emergent IHD on 6/14 for hyperkalemia. He was then transitioned to CRRT, CRRT discontinued on 6/19  Last dialysis treatment on 6/20  Labs these morning reviewed, creatinine unchanged at 1.9 but no significant urine output. Plan for short patient treatment today for volume removal, consider resume home diuretics after. Monitor ins and outs, keep systolic blood pressure over 120, will reassess during the weekend for renal recovery    2 shock, suspected mixed septic shock, acidosis, hypovolemic, resolved.   Currently off inotropic/pressor support  Keep MAP more than 65 mmHg and systolic blood pressure over 120    3 septic shock with E. coli urosepsis, resolved, completed 7 days of ceftriaxone    4. hypophosphatemia, improved after replacement, follows this morning 2.5    5 anemia, monitor H&H suspect a component of acute blood loss, possible GI bleeding status post transfusion, hemoglobin decreased to 7.8 today, monitor H&H and transfuse for hemoglobin less than 7    6 severe acidosis, lactic acidosis, concern for metformin toxicity, resolved    7 chronic lower extremity wounds and lymphedema okay to resume home diuretics today after dialysis    #8  CHF, A-fib, V. tach status post ICD, diuretics currently on hold, monitor volume status closely, okay to resume home diuretics after dialysis today monitor ins and outs    9 Access, right IJ PermCath placed by IR 6/19, accessed with good blood flow    My plan and recommendation was discussed with internal medicine attending, she agreed with the plan, for short HD treatment today for clearance and volume removal, consider resume home diuretics after dialysis as long as okay with primary team, borderline hypotension, keep systolic blood pressure over 120s, monitor ins and outs and follow daily labs    SUBJECTIVE:  Seen and examined during dialysis treatment, creatinine essentially unchanged but no significant urine output, patient feeling tired, denies any other complaints, no significant chest pain, no shortness of breath, no abdominal pain.       OBJECTIVE:  Current Weight: Weight - Scale: (!) 140 kg (308 lb 3.3 oz)  Vitals:    06/23/23 0714   BP: 125/65   Pulse: 98   Resp: 18   Temp: (!) 97.4 °F (36.3 °C)   SpO2: 98%       Intake/Output Summary (Last 24 hours) at 6/23/2023 0804  Last data filed at 6/23/2023 0549  Gross per 24 hour   Intake 0 ml   Output 358 ml   Net -358 ml       General: Chronically ill, conscious, cooperative, in not acute distress  Eyes: conjunctivae pink, anicteric sclerae  ENT: lips and mucous membranes dry, oxygen nasal cannula  Neck: supple, no JVD  Chest: clear breath sounds bilateral, no crackles, ronchus or wheezings  CVS: distinct S1 & S2, normal rate, regular rhythm  Abdomen: soft, non-tender, non-distended, normoactive bowel sounds  Extremities: 1-2+ edema of both legs  Skin: Chronic skin changes in lower extremities  Neuro: awake, alert, oriented  PermCath accessed with good blood flow        Medications:    Current Facility-Administered Medications:   •  bisacodyl (DULCOLAX) rectal suppository 10 mg, 10 mg, Rectal, Daily, Emeterio Cifuentes MD, 10 mg at 06/22/23 1616  •  diphenhydramine, lidocaine, Al/Mg hydroxide, simethicone (Magic Mouthwash) oral solution 10 mL, 10 mL, Swish & Spit, Q6H PRN, Remy Bautista MD  •  HYDROmorphone (DILAUDID) injection 0.5 mg, 0.5 mg, Intravenous, Q4H PRN, Burnett Kussmaul, MD, 0.5 mg at 06/23/23 1140  •  HYDROmorphone HCl (DILAUDID) injection 0.2 mg, 0.2 mg, Intravenous, Q4H PRN, Burnett Kussmaul, MD  •  insulin lispro (HumaLOG) 100 units/mL subcutaneous injection 2-12 Units, 2-12 Units, Subcutaneous, Q6H, 2 Units at 06/23/23 8291 **AND** Fingerstick Glucose (POCT), , , Q6H, ODESSA Mcfarland  •  lidocaine (LIDODERM) 5 % patch 1 patch, 1 patch, Topical, Daily, Juanita Bautista MD, 1 patch at 06/22/23 1010  •  lidocaine (LMX) 4 % cream, , Topical, 4x Daily PRN, Juanita Bautista MD, Given at 06/18/23 1640  •  melatonin tablet 6 mg, 6 mg, Oral, HS, ODESSA Mcfarland, 6 mg at 06/22/23 2142  •  pantoprazole (PROTONIX) injection 40 mg, 40 mg, Intravenous, Q24H [address], Janae Elliott MD, 40 mg at 06/22/23 1006  •  trimethobenzamide (TIGAN) IM injection 200 mg, 200 mg, Intramuscular, Q6H PRN, Juanita Bautista MD, 200 mg at 06/18/23 1640    Facility-Administered Medications Ordered in Other Encounters:   •  sodium chloride 0.9 % infusion, 75 mL/hr, Intravenous, Continuous, Keara Bal MD, Stopped at 05/26/23 1426    Invasive Devices:        Lab Results:   Results from last 7 days   Lab Units 06/23/23  0435 06/22/23  0355 06/21/23  0517 06/20/23  0455 06/19/23  1446 06/19/23  0550   WBC Thousand/uL 10.13 9.44 11.47* 10.96*  --  12.64*   HEMOGLOBIN g/dL 7.8* 8.2* 8.2* 7.0*  --  7.3*   HEMATOCRIT % 26.7* 26.5* 26.1* 22.9*  --  24.6*   PLATELETS Thousands/uL 110* 92* 69* 67*   < > 35*   SODIUM mmol/L 140 139 138 138  --  138   POTASSIUM mmol/L 4.0 4.0 3.9 3.9  --  4.0   CHLORIDE mmol/L 108 106 106 108  --  108   CO2 mmol/L 27 30 27 26  --  27   BUN mg/dL 21 18 14 18  --  12   CREATININE mg/dL 1.90* 1.90* 1.61* 1.83*  --  1.05   CALCIUM mg/dL 7.9* 8.1* 8.1* 8.7  --  8.8   MAGNESIUM mg/dL  --   --  1.8 1.9  --  2.0   PHOSPHORUS mg/dL 2.5  --  2.3 2.7  --  1.8*   ALK PHOS U/L  --   --   --   --   --  79   ALT U/L  --   --   --   --   --  23   AST U/L  --   --   --   --   --  39    < > = values in this interval not displayed. Previous work up:  See previous notes      Portions of the record may have been created with voice recognition software.  Occasional wrong word or "sound a like" substitutions may have occurred due to the inherent limitations of voice recognition software. Read the chart carefully and recognize, using context, where substitutions have occurred. If you have any questions, please contact the dictating provider.

## 2023-06-23 NOTE — PHYSICAL THERAPY NOTE
Physical Therapy Cancellation Note         06/23/23 0926   PT Last Visit   PT Visit Date 06/23/23   Note Type   Note Type Cancelled Session   Cancel Reasons Patient off floor/hemodialysis  (PT will attempt later today if time and continue to follow on caseload)       Clifford Caldwell PT, DPT

## 2023-06-23 NOTE — ASSESSMENT & PLAN NOTE
Admission platelets of 789. Steady downtrend this admission, saranya at 35. One instance of melena documented on 6/16 but at the time patient had platelete count in the 120k-130k. Unclear eitiology but likely 2/2 acute sepsis vs CVVH and filter clotting. Patient did receive heparin intermittently this admission however platelets have not responded to discontinuation of heparin products. Possibly cephalosporin induced, but patient is s/p 4 days of cefepime and then transitioned to ceftriaxone.    · Likely 2/2 sepsis  · No acute signs of bleeding on exam  · S/p 1 U platelets 8/08 in preparation for IR permacath placement  · Trend CBC  · Holding pharmacologic AC/DVT prophylaxis  · uptrending platelets noted

## 2023-06-23 NOTE — PROGRESS NOTES
[facility]  Progress Note  Name: Daphne Leonardo  MRN: [id number]  [facility] Date of Admission: 6/14/2023   Date of Service: 6/23/2023 I Hospital Day: 9    Assessment/Plan   * REMA (acute kidney injury) [facility])  Assessment & Plan  Patient downgraded from critical care, admitted for septic shock secondary to UTI leading to REMA requiring dialysis. · Likely UTI was the initial cause of REMA, exacerbated by concurrent metformin and sotolol use. · Combs has been removed  · monitoring bladder with q6h bladder scans  · CVVH discontinued 6/19  · tunneled dialysis catheter placed on 6/19, on intermittent hemodialysis  · Nephrology following   · Patient receiving dialysis today. · Plan to resume home diuretics today    Acute low back pain  Assessment & Plan  Patient reported acutely worsening low back pain that is very severe, 10/10, low back across, no radiation. No weakness of lower extremity. Xray obtained: Limited study without a true lateral image. Unchanged severe L4 compression deformity. Backspace Progressive loss of height of the L2 vertebral body may represent an acute fracture. Consider CT follow-up. CT L spine scan shows acute L1 fracture and chronic L4 fracture  Pain control with IV pain meds. Neurosx consult appreciated, no surgery at this point  LSO brace    Dysphagia  Assessment & Plan  Patient had VBS with speech   Unfortunately he is aspiration with both solid and liquid. change to NPO, will change PO meds to IV and other routes. Speech recommends PEG tube. Patient will need alternate means of nutrition. Discussed with GI for possible PEG placement. They recommend trial of NGT first.     Septic shock ([address])  Assessment & Plan  2/2 MDR E.coli urosepsis. Briefly required ICU stay and pressor support. Currently off pressors. Abx day 6, s/p 4 days of cefepime, currently day 3 of ceftriaxone.    · UCx shows Ecoli and klebsiella both susceptible to ceftriaxone  · Completed ceftriaxone for a total abx duration of 7 days. Thrombocytopenia ([address])  Assessment & Plan  Admission platelets of 299. Steady downtrend this admission, saranya at 35. One instance of melena documented on 6/16 but at the time patient had platelete count in the 120k-130k. Unclear eitiology but likely 2/2 acute sepsis vs CVVH and filter clotting. Patient did receive heparin intermittently this admission however platelets have not responded to discontinuation of heparin products. Possibly cephalosporin induced, but patient is s/p 4 days of cefepime and then transitioned to ceftriaxone. · Likely 2/2 sepsis  · No acute signs of bleeding on exam  · S/p 1 U platelets 6/01 in preparation for IR permacath placement  · Trend CBC  · Holding pharmacologic AC/DVT prophylaxis  · uptrending platelets noted      Prolonged QT interval  Assessment & Plan  Noted on EKG. Avoid QTC prolonging medications. CHF (congestive heart failure) (HCC)  Assessment & Plan  Wt Readings from Last 3 Encounters:   06/23/23 (!) 140 kg (308 lb 3.3 oz)   06/07/23 136 kg (299 lb 13.2 oz)   06/02/23 130 kg (285 lb 8 oz)       Patient with a hx of HFpEF70%. · Patient euvolemic after CVVH  · Currently holding home torsemide in the setting of anuria. · permacath placed for intermittent HD      History of ventricular tachycardia  Assessment & Plan  S/p DUAL CHAMBER ICD/ NOT MRI CONDITIONAL . 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 · Warfarin held in the setting of thrombocytopenia  · Patient currently rate controlled, continuing to hold sotalol in the setting of anuria. Acute on chronic anemia  Assessment & Plan  Patient with chronic macrocytic anemia baseline 7-8. · Did have a hemoglobin of 6.9 and received 1uPRCs for this   · hgb 7 yesterdya.  transfuse 1 unit PRBC with HD  · Daily CBCs  · Goal Hgb >7, replete as necessary  · GI following of melena  · EGD completed: Single small, superficial ulcer in the 1st part of the duodenum with clean base   · hgb stable 8.2  · Continue PPI  · Watch for s/s of GI bleed  · Resume DVT ppx with heparin and trend hgb. Continue to hold full AC with coumadin    Diabetes [facility])  Assessment & Plan  Lab Results   Component Value Date    HGBA1C 5.0 06/15/2023       Recent Labs     23  1105 23  1602 23  2354 23  0549   POCGLU 96 89 141* 157*       Blood Sugar Average: Last 72 hrs:  (P) 120.6096672617847888   Lab Results   Component Value Date    HGBA1C 5.0 06/15/2023       Recent Labs     23  1105 23  1602 23  2354 23  0549   POCGLU 96 89 141* 157*       Blood Sugar Average: Last 72 hrs:  (P) 081.2427991247198714     Plan:  · Home regimen of metformin held in the setting of REMA and metabolic acidosis. · Continue with SSI QID            VTE Pharmacologic Prophylaxis:   Pharmacologic: Heparin  Mechanical VTE Prophylaxis in Place: Yes    Patient Centered Rounds: I have performed bedside rounds with nursing staff today. Education and Discussions with Family / Patient: wife present at bedside       Current Length of Stay: 9 day(s)    Current Patient Status: Inpatient   Certification Statement: The patient will continue to require additional inpatient hospital stay due to not medically stable    Discharge Plan: likely need another few days of NG tube feeding and trial    Code Status: Level 1 - Full Code      Subjective:     No overnight event. Patient is tolerating  Current rate of NG tube feeding. No abdominal pain. No nausea reported.  Awaiting goal feeding for NG tube    Objective:     Vitals:   Temp (24hrs), Av.2 °F (36.8 °C), Min:97.4 °F (36.3 °C), Max:98.7 °F (37.1 °C)    Temp:  [97.4 °F (36.3 °C)-98.7 °F (37.1 °C)] 97.4 °F (36.3 °C)  HR:  [90-98] 97  Resp:  [17-20] 18  BP: (118-145)/(63-84) 125/73  SpO2:  [93 %-98 %] 98 %  Body mass index is 40.66 kg/m². Input and Output Summary (last 24 hours): Intake/Output Summary (Last 24 hours) at 6/23/2023 0948  Last data filed at 6/23/2023 0830  Gross per 24 hour   Intake 200 ml   Output 358 ml   Net -158 ml       Physical Exam:     Physical Exam  Vitals and nursing note reviewed. Constitutional:       General: He is not in acute distress. Appearance: Normal appearance. HENT:      Head: Normocephalic and atraumatic. Right Ear: External ear normal.      Left Ear: External ear normal.      Nose: Nose normal.   Eyes:      Extraocular Movements: Extraocular movements intact. Pulmonary:      Effort: Pulmonary effort is normal.   Musculoskeletal:      Cervical back: Normal range of motion. Neurological:      Mental Status: He is alert. Mental status is at baseline. Psychiatric:         Mood and Affect: Mood normal.            Labs:    Results from last 7 days   Lab Units 06/23/23  0435 06/21/23  0517 06/20/23  0455   WBC Thousand/uL 10.13   < > 10.96*   HEMOGLOBIN g/dL 7.8*   < > 7.0*   HEMATOCRIT % 26.7*   < > 22.9*   PLATELETS Thousands/uL 110*   < > 67*   NEUTROS PCT %  --   --  74   LYMPHS PCT %  --   --  13*   MONOS PCT %  --   --  9   EOS PCT %  --   --  2    < > = values in this interval not displayed. Results from last 7 days   Lab Units 06/23/23  0435 06/20/23  0455 06/19/23  0550   SODIUM mmol/L 140   < > 138   POTASSIUM mmol/L 4.0   < > 4.0   CHLORIDE mmol/L 108   < > 108   CO2 mmol/L 27   < > 27   BUN mg/dL 21   < > 12   CREATININE mg/dL 1.90*   < > 1.05   ANION GAP mmol/L 5   < > 3*   CALCIUM mg/dL 7.9*   < > 8.8   ALBUMIN g/dL 2.1*  --  2.6*   TOTAL BILIRUBIN mg/dL  --   --  1.45*   ALK PHOS U/L  --   --  79   ALT U/L  --   --  23   AST U/L  --   --  39   GLUCOSE RANDOM mg/dL 139   < > 112    < > = values in this interval not displayed.      Results from last 7 days   Lab Units 06/22/23  0355   INR  1.74*     Results from last 7 days   Lab Units 06/23/23  0549 06/22/23  2354 06/22/23  1602 06/22/23  1105 06/22/23  0623 06/21/23  2125 06/21/23  1623 06/21/23  1041 06/21/23  0604 06/20/23  2100 06/20/23  1647 06/20/23  1606   POC GLUCOSE mg/dl 157* 141* 89 96 97 108 122 123 123 120 118 147*         Results from last 7 days   Lab Units 06/17/23  1217 06/17/23  0535 06/17/23  0013 06/16/23  2033   LACTIC ACID mmol/L 1.6 1.8 2.5* 3.6*              Recent Cultures (last 7 days):           Last 24 Hours Medication List:   Current Facility-Administered Medications   Medication Dose Route Frequency Provider Last Rate   • bisacodyl  10 mg Rectal Daily Emeterio Cifuentes MD     • diphenhydramine, lidocaine, Al/Mg hydroxide, simethicone  10 mL Swish & Spit Q6H PRN Sherri Bautista MD     • heparin (porcine)  5,000 Units Subcutaneous Q8H [address] Emeterio Cifuentes MD     • HYDROmorphone  0.5 mg Intravenous Q4H PRN Nicole Santiago MD     • HYDROmorphone  0.2 mg Intravenous Q4H PRN Nicole Santiago MD     • insulin lispro  2-12 Units Subcutaneous Q6H ODESSA Rayo     • lidocaine  1 patch Topical Daily Sherri Bautista MD     • lidocaine   Topical 4x Daily PRN Sherri Bautista MD     • melatonin  6 mg Oral HS ODESSA Rayo     • pantoprazole  40 mg Intravenous Q24H Kelly Edgar MD     • trimethobenzamide  200 mg Intramuscular Q6H PRN Sherri Bautista MD       Facility-Administered Medications Ordered in Other Encounters   Medication Dose Route Frequency Provider Last Rate   • sodium chloride  75 mL/hr Intravenous Continuous Heidi Sorensen MD Stopped (05/26/23 1426)        Today, Patient Was Seen By: Nicole Santiago MD    ** Please Note: Dictation voice to text software may have been used in the creation of this document.  **

## 2023-06-23 NOTE — ASSESSMENT & PLAN NOTE
Wt Readings from Last 3 Encounters:   06/23/23 (!) 140 kg (308 lb 3.3 oz)   06/07/23 136 kg (299 lb 13.2 oz)   06/02/23 130 kg (285 lb 8 oz)       Patient with a hx of HFpEF70%. · Patient euvolemic after CVVH  · Currently holding home torsemide in the setting of anuria.    · permacath placed for intermittent HD

## 2023-06-23 NOTE — PLAN OF CARE
Patient presents for a 2.5 hour HD session on a 3K2.5Ca for a serum potassium of 4.0 mmol/L drawn on 6/23/23 with a net UF goal of 2 L as tolerated per discussion with Dr. Frandy Xavier at bedside. Post-Dialysis RN Treatment Note    Blood Pressure:  Pre 145/84 mm/Hg  Post 126/73 mmHg   EDW  TBD kg    Weight:  Pre 139.8 kg   Post 137.8 kg   Mode of weight measurement: Bed Scale   Volume Removed  2000 ml    Treatment duration 150 minutes    NS given  No    Treatment shortened?  No   Medications given during Rx 0.5 mcg Dilaudid   Estimated Kt/V  0.56   Access type: Permacath/TDC   Access Issues: No    Report called to primary nurse   Yes, Kg WELLS.     Problem: METABOLIC, FLUID AND ELECTROLYTES - ADULT  Goal: Electrolytes maintained within normal limits  Description: INTERVENTIONS:  - Monitor labs and assess patient for signs and symptoms of electrolyte imbalances  - Administer electrolyte replacement as ordered  - Monitor response to electrolyte replacements, including repeat lab results as appropriate  - Instruct patient on fluid and nutrition as appropriate  Outcome: Progressing  Goal: Fluid balance maintained  Description: INTERVENTIONS:  - Monitor labs   - Monitor I/O and WT  - Instruct patient on fluid and nutrition as appropriate  - Assess for signs & symptoms of volume excess or deficit  Outcome: Progressing

## 2023-06-23 NOTE — ASSESSMENT & PLAN NOTE
Lab Results   Component Value Date    HGBA1C 5.0 06/15/2023       Recent Labs     06/22/23  1105 06/22/23  1602 06/22/23  2354 06/23/23  0549   POCGLU 96 89 141* 157*       Blood Sugar Average: Last 72 hrs:  (P) 267.7601383444056369   Lab Results   Component Value Date    HGBA1C 5.0 06/15/2023       Recent Labs     06/22/23  1105 06/22/23  1602 06/22/23  2354 06/23/23  0549   POCGLU 96 89 141* 157*       Blood Sugar Average: Last 72 hrs:  (P) 725.8514679559867311     Plan:  · Home regimen of metformin held in the setting of REMA and metabolic acidosis.    · Continue with SSI QID

## 2023-06-23 NOTE — ASSESSMENT & PLAN NOTE
Patient reported acutely worsening low back pain that is very severe, 10/10, low back across, no radiation. No weakness of lower extremity. Xray obtained: Limited study without a true lateral image. Unchanged severe L4 compression deformity. Backspace Progressive loss of height of the L2 vertebral body may represent an acute fracture. Consider CT follow-up. CT L spine scan shows acute L1 fracture and chronic L4 fracture  Pain control with IV pain meds.    Neurosx consult appreciated, no surgery at this point  LSO brace

## 2023-06-23 NOTE — ASSESSMENT & PLAN NOTE
Patient with chronic macrocytic anemia baseline 7-8. · Did have a hemoglobin of 6.9 and received 1uPRCs for this   · hgb 7 yesterdya. transfuse 1 unit PRBC with HD  · Daily CBCs  · Goal Hgb >7, replete as necessary  · GI following of melena  · EGD completed: Single small, superficial ulcer in the 1st part of the duodenum with clean base   · hgb stable 8.2  · Continue PPI  · Watch for s/s of GI bleed  · Resume DVT ppx with heparin and trend hgb.  Continue to hold full AC with coumadin

## 2023-06-23 NOTE — CONSULTS
Consult received and appreciated. Pt assessed by RD on 6/22/23 who provided TF recommendations:    Recommend [email or web address], advance by 10mL every 6hrs to goal of 65mL/hr, water flushes 50mL every 4hrs provides total of 2340cal, 100g pro, 1486mL. Will continue to monitor labs, tolerance, weight and need for any further adjustments.

## 2023-06-24 PROBLEM — R50.9 FEVER: Status: ACTIVE | Noted: 2023-01-01

## 2023-06-24 NOTE — ASSESSMENT & PLAN NOTE
Patient downgraded from critical care, admitted for septic shock secondary to UTI leading to REMA requiring dialysis. · Likely UTI was the initial cause of REMA, exacerbated by concurrent metformin and sotolol use. · Combs has been removed  · monitoring bladder with q6h bladder scans  · CVVH discontinued 6/19  · tunneled dialysis catheter placed on 6/19, on intermittent hemodialysis  · Nephrology following   · Patient receiving dialysis today. · Plan to resume home diuretics today however due to NPO, start IV lasix.    · Nephrology will re-assess renal fx on Monday and decide whether he will need routine HD and outpatient HD chair

## 2023-06-24 NOTE — ASSESSMENT & PLAN NOTE
Wt Readings from Last 3 Encounters:   06/24/23 (!) 138 kg (304 lb 6.4 oz)   06/07/23 136 kg (299 lb 13.2 oz)   06/02/23 130 kg (285 lb 8 oz)       Patient with a hx of HFpEF70%. · Patient euvolemic after CVVH  · Currently holding home torsemide in the setting of anuria.    · permacath placed for intermittent HD  · D/w nephro, started on IV lasix

## 2023-06-24 NOTE — PROGRESS NOTES
[address] [place] [address]  Progress Note  Name: Mayela Tate  MRN: [id number]  Unit/Bed#: [facility] 506-08 I Date of Admission: 6/14/2023   Date of Service: 6/24/2023 I Hospital Day: 10    Assessment/Plan   Fever  Assessment & Plan  Patient spiked a fever of 101.4 around 1:30 PM today 6/24/23. Patient already completed Rocephin x 7 days for urosepsis earlier in the admission. Denies abdominal pain, dyspnea, chest pain, nausea, vomiting. On exam, abdomen non tender, non distended. No obvious cellulitis noted on extremities on abdomen. Obtain UA, CXR, BCx x 2, lactic acid, procalcitonin. Will await to initiate Abx until workup returns.

## 2023-06-24 NOTE — TREATMENT PLAN
Reviewed upright x-rays. Stable appearance of L1 acute fracture and chronic L4 fractures. Poor quality of x-rays. Per request of orthotic team, will place patient in LSO brace when upright and OOB as he was not tolerating TLSO brace well. Follow up as scheduled. Call with questions. Neurosurgery will sign off.

## 2023-06-24 NOTE — ASSESSMENT & PLAN NOTE
Patient with chronic macrocytic anemia baseline 7-8. · Did have a hemoglobin of 6.9 in CC and received 1uPRCs for this on 6/17  · Additional 1 unit PRBC received on 6/20  · Goal Hgb >7, replete as necessary  · EGD completed: Single small, superficial ulcer in the 1st part of the duodenum with clean base   · hgb stable 8.1  · Continue PPI  · Watch for s/s of GI bleed  · Resume DVT ppx with heparin and trend hgb. · Continue to hold full AC with coumadin for possible PEG placement next week.

## 2023-06-24 NOTE — PLAN OF CARE
Problem: METABOLIC, FLUID AND ELECTROLYTES - ADULT  Goal: Electrolytes maintained within normal limits  Description: INTERVENTIONS:  - Monitor labs and assess patient for signs and symptoms of electrolyte imbalances  - Administer electrolyte replacement as ordered  - Monitor response to electrolyte replacements, including repeat lab results as appropriate  - Instruct patient on fluid and nutrition as appropriate  Outcome: Progressing  Goal: Fluid balance maintained  Description: INTERVENTIONS:  - Monitor labs   - Monitor I/O and WT  - Instruct patient on fluid and nutrition as appropriate  - Assess for signs & symptoms of volume excess or deficit  Outcome: Progressing  Goal: Glucose maintained within target range  Description: INTERVENTIONS:  - Monitor Blood Glucose as ordered  - Assess for signs and symptoms of hyperglycemia and hypoglycemia  - Administer ordered medications to maintain glucose within target range  - Assess nutritional intake and initiate nutrition service referral as needed  Outcome: Progressing     Problem: MOBILITY - ADULT  Goal: Maintain or return to baseline ADL function  Description: INTERVENTIONS:  -  Assess patient's ability to carry out ADLs; assess patient's baseline for ADL function and identify physical deficits which impact ability to perform ADLs (bathing, care of mouth/teeth, toileting, grooming, dressing, etc.)  - Assess/evaluate cause of self-care deficits   - Assess range of motion  - Assess patient's mobility; develop plan if impaired  - Assess patient's need for assistive devices and provide as appropriate  - Encourage maximum independence but intervene and supervise when necessary  - Involve family in performance of ADLs  - Assess for home care needs following discharge   - Consider OT consult to assist with ADL evaluation and planning for discharge  - Provide patient education as appropriate  Outcome: Progressing  Goal: Maintains/Returns to pre admission functional level  Description: INTERVENTIONS:  - Perform BMAT or MOVE assessment daily.   - Set and communicate daily mobility goal to care team and patient/family/caregiver. - Collaborate with rehabilitation services on mobility goals if consulted  - Perform Range of Motion 2 times a day. - Reposition patient every 2 hours. - Dangle patient 2 times a day  - Stand patient 2 times a day  - Ambulate patient 2 times a day  - Out of bed to chair 2 times a day   - Out of bed for meals 2 times a day  - Out of bed for toileting  - Record patient progress and toleration of activity level   Outcome: Progressing     Problem: Prexisting or High Potential for Compromised Skin Integrity  Goal: Skin integrity is maintained or improved  Description: INTERVENTIONS:  - Identify patients at risk for skin breakdown  - Assess and monitor skin integrity  - Assess and monitor nutrition and hydration status  - Monitor labs   - Assess for incontinence   - Turn and reposition patient  - Assist with mobility/ambulation  - Relieve pressure over bony prominences  - Avoid friction and shearing  - Provide appropriate hygiene as needed including keeping skin clean and dry  - Evaluate need for skin moisturizer/barrier cream  - Collaborate with interdisciplinary team   - Patient/family teaching  - Consider wound care consult   Outcome: Progressing     Problem: Nutrition/Hydration-ADULT  Goal: Nutrient/Hydration intake appropriate for improving, restoring or maintaining nutritional needs  Description: Monitor and assess patient's nutrition/hydration status for malnutrition. Collaborate with interdisciplinary team and initiate plan and interventions as ordered. Monitor patient's weight and dietary intake as ordered or per policy. Utilize nutrition screening tool and intervene as necessary. Determine patient's food preferences and provide high-protein, high-caloric foods as appropriate.      INTERVENTIONS:  - Monitor oral intake, urinary output, labs, and treatment plans  - Assess nutrition and hydration status and recommend course of action  - Evaluate amount of meals eaten  - Assist patient with eating if necessary   - Allow adequate time for meals  - Recommend/ encourage appropriate diets, oral nutritional supplements, and vitamin/mineral supplements  - Order, calculate, and assess calorie counts as needed  - Recommend, monitor, and adjust tube feedings and TPN/PPN based on assessed needs  - Assess need for intravenous fluids  - Provide specific nutrition/hydration education as appropriate  - Include patient/family/caregiver in decisions related to nutrition  Outcome: Progressing     Problem: PAIN - ADULT  Goal: Verbalizes/displays adequate comfort level or baseline comfort level  Description: Interventions:  - Encourage patient to monitor pain and request assistance  - Assess pain using appropriate pain scale  - Administer analgesics based on type and severity of pain and evaluate response  - Implement non-pharmacological measures as appropriate and evaluate response  - Consider cultural and social influences on pain and pain management  - Notify physician/advanced practitioner if interventions unsuccessful or patient reports new pain  Outcome: Progressing     Problem: INFECTION - ADULT  Goal: Absence or prevention of progression during hospitalization  Description: INTERVENTIONS:  - Assess and monitor for signs and symptoms of infection  - Monitor lab/diagnostic results  - Monitor all insertion sites, i.e. indwelling lines, tubes, and drains  - Monitor endotracheal if appropriate and nasal secretions for changes in amount and color  - [place] appropriate cooling/warming therapies per order  - Administer medications as ordered  - Instruct and encourage patient and family to use good hand hygiene technique  - Identify and instruct in appropriate isolation precautions for identified infection/condition  Outcome: Progressing     Problem: SAFETY ADULT  Goal: Maintain or return to baseline ADL function  Description: INTERVENTIONS:  -  Assess patient's ability to carry out ADLs; assess patient's baseline for ADL function and identify physical deficits which impact ability to perform ADLs (bathing, care of mouth/teeth, toileting, grooming, dressing, etc.)  - Assess/evaluate cause of self-care deficits   - Assess range of motion  - Assess patient's mobility; develop plan if impaired  - Assess patient's need for assistive devices and provide as appropriate  - Encourage maximum independence but intervene and supervise when necessary  - Involve family in performance of ADLs  - Assess for home care needs following discharge   - Consider OT consult to assist with ADL evaluation and planning for discharge  - Provide patient education as appropriate  Outcome: Progressing  Goal: Maintains/Returns to pre admission functional level  Description: INTERVENTIONS:  - Perform BMAT or MOVE assessment daily.   - Set and communicate daily mobility goal to care team and patient/family/caregiver. - Collaborate with rehabilitation services on mobility goals if consulted  - Perform Range of Motion 2 times a day. - Reposition patient every 2 hours.   - Dangle patient 2 times a day  - Stand patient 2 times a day  - Ambulate patient 2 times a day  - Out of bed to chair 2 times a day   - Out of bed for meals 2 times a day  - Out of bed for toileting  - Record patient progress and toleration of activity level   Outcome: Progressing  Goal: Patient will remain free of falls  Description: INTERVENTIONS:  - Educate patient/family on patient safety including physical limitations  - Instruct patient to call for assistance with activity   - Consult OT/PT to assist with strengthening/mobility   - Keep Call bell within reach  - Keep bed low and locked with side rails adjusted as appropriate  - Keep care items and personal belongings within reach  - Initiate and maintain comfort rounds  - Make Fall Risk Sign visible to staff  - Apply yellow socks and bracelet for high fall risk patients  - Consider moving patient to room near nurses station  Outcome: Progressing     Problem: DISCHARGE PLANNING  Goal: Discharge to home or other facility with appropriate resources  Description: INTERVENTIONS:  - Identify barriers to discharge w/patient and caregiver  - Arrange for needed discharge resources and transportation as appropriate  - Identify discharge learning needs (meds, wound care, etc.)  - Arrange for interpretive services to assist at discharge as needed  - Refer to Case Management Department for coordinating discharge planning if the patient needs post-hospital services based on physician/advanced practitioner order or complex needs related to functional status, cognitive ability, or social support system  Outcome: Progressing     Problem: Knowledge Deficit  Goal: Patient/family/caregiver demonstrates understanding of disease process, treatment plan, medications, and discharge instructions  Description: Complete learning assessment and assess knowledge base.   Interventions:  - Provide teaching at level of understanding  - Provide teaching via preferred learning methods  Outcome: Progressing     Problem: RESPIRATORY - ADULT  Goal: Achieves optimal ventilation and oxygenation  Description: INTERVENTIONS:  - Assess for changes in respiratory status  - Assess for changes in mentation and behavior  - Position to facilitate oxygenation and minimize respiratory effort  - Oxygen administered by appropriate delivery if ordered  - Initiate smoking cessation education as indicated  - Encourage broncho-pulmonary hygiene including cough, deep breathe, Incentive Spirometry  - Assess the need for suctioning and aspirate as needed  - Assess and instruct to report SOB or any respiratory difficulty  - Respiratory Therapy support as indicated  Outcome: Progressing     Problem: GENITOURINARY - ADULT  Goal: Maintains or returns to baseline urinary function  Description: INTERVENTIONS:  - Assess urinary function  - Encourage oral fluids to ensure adequate hydration if ordered  - Administer IV fluids as ordered to ensure adequate hydration  - Administer ordered medications as needed  - Offer frequent toileting  - Follow urinary retention protocol if ordered  Outcome: Progressing     Problem: MUSCULOSKELETAL - ADULT  Goal: Maintain or return mobility to safest level of function  Description: INTERVENTIONS:  - Assess patient's ability to carry out ADLs; assess patient's baseline for ADL function and identify physical deficits which impact ability to perform ADLs (bathing, care of mouth/teeth, toileting, grooming, dressing, etc.)  - Assess/evaluate cause of self-care deficits   - Assess range of motion  - Assess patient's mobility  - Assess patient's need for assistive devices and provide as appropriate  - Encourage maximum independence but intervene and supervise when necessary  - Involve family in performance of ADLs  - Assess for home care needs following discharge   - Consider OT consult to assist with ADL evaluation and planning for discharge  - Provide patient education as appropriate  Outcome: Progressing

## 2023-06-24 NOTE — ASSESSMENT & PLAN NOTE
Patient spiked a fever of 101.4 around 1:30 PM today 6/24/23. Patient already completed Rocephin x 7 days for urosepsis earlier in the admission. Obtain UA, CXR, BCx x 2, lactic acid, procalcitonin. Will await to initiate Abx until workup returns.

## 2023-06-24 NOTE — DISCHARGE INSTR - AVS FIRST PAGE
Neurosurgery discharge instructions following spine fracture:     LSO brace to be worn when out of bed OR head of bed greater than 45 degrees. May place brace on while sitting on edge of bed. May be removed for showering. Do not wear while sleeping or laying flat/reclined. No bending, twisting or heavy lifting. No strenuous activities. No Driving. Follow-up as scheduled in two weeks with repeat spine x-rays to be completed 2-3 days prior to visit. Prescription has been entered electronically. **Please notify MD immediately if you have increased back or leg pain.  New numbness, tingling, weakness in your legs and/or bowel/bladder incontinence** Spiral Flap Text: The defect edges were debeveled with a #15 scalpel blade.  Given the location of the defect, shape of the defect and the proximity to free margins a spiral flap was deemed most appropriate.  Using a sterile surgical marker, an appropriate rotation flap was drawn incorporating the defect and placing the expected incisions within the relaxed skin tension lines where possible. The area thus outlined was incised deep to adipose tissue with a #15 scalpel blade.  The skin margins were undermined to an appropriate distance in all directions utilizing iris scissors.

## 2023-06-24 NOTE — ASSESSMENT & PLAN NOTE
Patient spiked a fever of 101.4  6/24/23. S/p  Rocephin x 7 days for urosepsis earlier in the admission. Currently afebrile  Denies abdominal pain, dyspnea, chest pain, nausea, vomiting. On exam, abdomen non tender, non distended. No obvious cellulitis noted on extremities on abdomen. CXR with mild pulmonary edema, procal and lactic acid negative  UA and repeat blood cultures pending  Continue to monitor off abx  Had positive COVID test two months ago.  So low utility of repeating as it may be false positive

## 2023-06-24 NOTE — ASSESSMENT & PLAN NOTE
Lab Results   Component Value Date    HGBA1C 5.0 06/15/2023       Recent Labs     06/23/23  1118 06/23/23  1747 06/23/23  2332 06/24/23  0618   POCGLU 145* 152* 172* 194*       Blood Sugar Average: Last 72 hrs:  (P) [id number]   Lab Results   Component Value Date    HGBA1C 5.0 06/15/2023       Recent Labs     06/23/23  1118 06/23/23  1747 06/23/23  2332 06/24/23  0618   POCGLU 145* 152* 172* 194*       Blood Sugar Average: Last 72 hrs:  (P) [id number]     Plan:  · Home regimen of metformin held in the setting of REMA and metabolic acidosis.    · Continue with SSI QID

## 2023-06-24 NOTE — RESTORATIVE TECHNICIAN NOTE
Restorative Technician Note      Patient Name: Dafne Cardoza     Patient Active Problem List   Diagnosis   • Obesity, unspecified obesity severity, unspecified obesity type   • Diabetes ([address])   • Hydrocele   • Chronic anticoagulation   • Acute on chronic anemia   • Atrial fibrillation (HCC)   • History of AVR   • PVD (peripheral vascular disease) ([address])   • Thrombophlebitis   • History of ventricular tachycardia   • Edema   • Acute on chronic diastolic CHF   • Venous ulcer of right ankle   • Peripheral venous insufficiency   • Ventricular tachycardia   • Secondary lymphedema   • CKD (chronic kidney disease) stage 3, GFR 30-59 ml/min (HCC)   • Morbid obesity   • Benign prostatic hyperplasia with lower urinary tract symptoms   • Acute cholecystitis   • REMA (acute kidney injury) ([address])   • Cardiomegaly   • Chronic bilateral low back pain without sciatica   • 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   • Dyslipidemia   • Endocarditis   • Hyperlipidemia   • Osteoarthritis, knee   • Renal cyst   • Status post right knee replacement   • Swelling of limb   • BPH (benign prostatic hyperplasia)   • History of venous thromboembolism   • Acute on chronic cholecystitis   • Cholecystostomy care [facility])   • Gout   • Calculus of gallbladder   • Extrahepatic biloma   • COVID-19   • Pleural effusion on right   • Melena   • Pulmonary embolism (HCC)   • CHF (congestive heart failure) ([address])   • Fall   • Acidosis   • Hyponatremia   • Hypophosphatemia   • Prolonged QT interval   • Thrombocytopenia ([address])   • Septic shock (HCC)   • Dysphagia   • Acute low back pain     Note Type: Bracing, Initial consult  Patient Position Upon Consult: Supine  Brace Applied: [employer] (Fit with [employer] extension panel.)  Education Provided: Yes ([employer] instructions sheet given/reviewed with pt.)  Patient Position at End of Consult: Supine;  All needs within reach; Bed/Chair alarm activated  Nurse Communication: Nurse aware of consult, application of deo Maya BS, Restorative Technician, [employer]

## 2023-06-24 NOTE — ASSESSMENT & PLAN NOTE
Patient had VBS with speech   Unfortunately he is aspiration with both solid and liquid. change to NPO, will change PO meds to IV and other routes. Speech recommends PEG tube. GI recommends trial of NG tube first prior to committing to PEG tube. Started NG tube feeding 6/22, currently at goal, tolerating. Will need repeat VBS at this admission to assess improvement.  If fails VBS again, will need PEG

## 2023-06-24 NOTE — ASSESSMENT & PLAN NOTE
Admission platelets of 829. Steady downtrend this admission, saranya at 35. One instance of melena documented on 6/16 but at the time patient had platelete count in the 120k-130k. Unclear eitiology but likely 2/2 acute sepsis vs CVVH and filter clotting. Patient did receive heparin intermittently this admission however platelets have not responded to discontinuation of heparin products. Possibly cephalosporin induced, but patient is s/p 4 days of cefepime and then transitioned to ceftriaxone. · Likely 2/2 sepsis  · No acute signs of bleeding on exam  · S/p 1 U platelets 5/81 in preparation for IR permacath placement  · Trend CBC  · Resumed Heparin SC for PPx yesterday.  Continue to monitor for platelet  · uptrending platelets noted

## 2023-06-24 NOTE — PROGRESS NOTES
[address]  Progress Note  Name: Michelle Jimenes  MRN: [id number]  Unit/Bed#: [facility] 612-00 I Date of Admission: 6/14/2023   Date of Service: 6/24/2023 I Hospital Day: 10    Assessment/Plan   * REMA (acute kidney injury) [facility])  Assessment & Plan  Patient downgraded from critical care, admitted for septic shock secondary to UTI leading to REMA requiring dialysis. · Likely UTI was the initial cause of REMA, exacerbated by concurrent metformin and sotolol use. · Combs has been removed  · monitoring bladder with q6h bladder scans  · CVVH discontinued 6/19  · tunneled dialysis catheter placed on 6/19, on intermittent hemodialysis  · Nephrology following   · Patient receiving dialysis today. · Plan to resume home diuretics today however due to NPO, start IV lasix. · Nephrology will re-assess renal fx on Monday and decide whether he will need routine HD and outpatient HD chair    Acute low back pain  Assessment & Plan  Patient reported acutely worsening low back pain that is very severe, 10/10, low back across, no radiation. No weakness of lower extremity. Xray obtained: Limited study without a true lateral image. Unchanged severe L4 compression deformity. Backspace Progressive loss of height of the L2 vertebral body may represent an acute fracture. Consider CT follow-up. CT L spine scan shows acute L1 fracture and chronic L4 fracture  Pain control with IV pain meds. Neurosx consult appreciated, no surgery at this point  LSO brace    Dysphagia  Assessment & Plan  Patient had VBS with speech   Unfortunately he is aspiration with both solid and liquid. change to NPO, will change PO meds to IV and other routes. Speech recommends PEG tube. GI recommends trial of NG tube first prior to committing to PEG tube. Started NG tube feeding 6/22, currently at goal, tolerating. Will need repeat VBS at this admission to assess improvement.  If fails VBS again, will need PEG    Septic shock [facility])  Assessment & Plan  2/2 MDR E.coli urosepsis. Briefly required ICU stay and pressor support. Currently off pressors. Abx day 6, s/p 4 days of cefepime, currently day 3 of ceftriaxone. · UCx shows Ecoli and klebsiella both susceptible to ceftriaxone  · Completed ceftriaxone for a total abx duration of 7 days. Thrombocytopenia ([address])  Assessment & Plan  Admission platelets of 379. Steady downtrend this admission, saranya at 35. One instance of melena documented on 6/16 but at the time patient had platelete count in the 120k-130k. Unclear eitiology but likely 2/2 acute sepsis vs CVVH and filter clotting. Patient did receive heparin intermittently this admission however platelets have not responded to discontinuation of heparin products. Possibly cephalosporin induced, but patient is s/p 4 days of cefepime and then transitioned to ceftriaxone. · Likely 2/2 sepsis  · No acute signs of bleeding on exam  · S/p 1 U platelets 8/59 in preparation for IR permacath placement  · Trend CBC  · Resumed Heparin SC for PPx yesterday. Continue to monitor for platelet  · uptrending platelets noted      Prolonged QT interval  Assessment & Plan  Noted on EKG. Avoid QTC prolonging medications. CHF (congestive heart failure) (HCC)  Assessment & Plan  Wt Readings from Last 3 Encounters:   06/24/23 (!) 138 kg (304 lb 6.4 oz)   06/07/23 136 kg (299 lb 13.2 oz)   06/02/23 130 kg (285 lb 8 oz)       Patient with a hx of HFpEF70%. · Patient euvolemic after CVVH  · Currently holding home torsemide in the setting of anuria. · permacath placed for intermittent HD  · D/w nephro, started on IV lasix      History of ventricular tachycardia  Assessment & Plan  S/p DUAL CHAMBER ICD/ NOT MRI CONDITIONAL . 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    · Warfarin held in the setting of thrombocytopenia  · Patient currently rate controlled, continuing to hold sotalol in the setting of anuria. Acute on chronic anemia  Assessment & Plan  Patient with chronic macrocytic anemia baseline 7-8. · Did have a hemoglobin of 6.9 in CC and received 1uPRCs for this on   · Additional 1 unit PRBC received on   · Goal Hgb >7, replete as necessary  · EGD completed: Single small, superficial ulcer in the 1st part of the duodenum with clean base   · hgb stable 8.1  · Continue PPI  · Watch for s/s of GI bleed  · Resume DVT ppx with heparin and trend hgb. · Continue to hold full AC with coumadin for possible PEG placement next week. Diabetes [facility])  Assessment & Plan  Lab Results   Component Value Date    HGBA1C 5.0 06/15/2023       Recent Labs     23  1118 23  1747 23  2332 23  0618   POCGLU 145* 152* 172* 194*       Blood Sugar Average: Last 72 hrs:  (P) 132.3250775281010998   Lab Results   Component Value Date    HGBA1C 5.0 06/15/2023       Recent Labs     23  1118 23  1747 23  2332 23  0618   POCGLU 145* 152* 172* 194*       Blood Sugar Average: Last 72 hrs:  (P) 132.0134902922810229     Plan:  · Home regimen of metformin held in the setting of REMA and metabolic acidosis. · Continue with SSI QID               VTE Pharmacologic Prophylaxis:   Pharmacologic: Heparin  Mechanical VTE Prophylaxis in Place: Yes    Patient Centered Rounds: I have performed bedside rounds with nursing staff today. Current Length of Stay: 10 day(s)    Current Patient Status: Inpatient   Certification Statement: The patient will continue to require additional inpatient hospital stay due to not medically stable    Discharge Plan: atleast 72 hours    Code Status: Level 1 - Full Code      Subjective:     No overnight event  Tolerating NG tube diet. Noted both hgb and platelets uptrending.    Received HD yesterday, creatinine better today 1.63    Objective:     Vitals:   Temp (24hrs), Av.6 °F (37 °C), Min:97.2 °F (36.2 °C), Max:100 °F (37.8 °C)    Temp:  [97.2 °F (36.2 °C)-100 °F (37.8 °C)] 99 °F (37.2 °C)  HR:  [] 103  Resp:  [17-20] 17  BP: (119-144)/(53-73) 144/73  SpO2:  [96 %-100 %] 100 %  Body mass index is 40.16 kg/m². Input and Output Summary (last 24 hours): Intake/Output Summary (Last 24 hours) at 6/24/2023 1053  Last data filed at 6/24/2023 8367  Gross per 24 hour   Intake 2316 ml   Output 2925 ml   Net -609 ml       Physical Exam:     Physical Exam  Vitals and nursing note reviewed. Constitutional:       General: He is not in acute distress. Appearance: Normal appearance. He is ill-appearing. HENT:      Head: Normocephalic and atraumatic. Right Ear: External ear normal.      Left Ear: External ear normal.      Nose: Nose normal.   Eyes:      Extraocular Movements: Extraocular movements intact. Pulmonary:      Effort: Pulmonary effort is normal.   Musculoskeletal:      Cervical back: Normal range of motion. Neurological:      Mental Status: He is alert. Mental status is at baseline. Psychiatric:         Mood and Affect: Mood normal.          Additional Data:     Labs:    Results from last 7 days   Lab Units 06/24/23  0525 06/21/23  0517 06/20/23  0455   WBC Thousand/uL 10.36*   < > 10.96*   HEMOGLOBIN g/dL 8.1*   < > 7.0*   HEMATOCRIT % 26.4*   < > 22.9*   PLATELETS Thousands/uL 136*   < > 67*   NEUTROS PCT %  --   --  74   LYMPHS PCT %  --   --  13*   MONOS PCT %  --   --  9   EOS PCT %  --   --  2    < > = values in this interval not displayed.      Results from last 7 days   Lab Units 06/24/23  0525 06/23/23  0435 06/20/23  0455 06/19/23  0550   SODIUM mmol/L 139 140   < > 138   POTASSIUM mmol/L 3.9 4.0   < > 4.0   CHLORIDE mmol/L 108 108   < > 108   CO2 mmol/L 28 27   < > 27   BUN mg/dL 19 21   < > 12   CREATININE mg/dL 1.63* 1.90*   < > 1.05   ANION GAP mmol/L 3 5   < > 3*   CALCIUM mg/dL 8.3 7.9*   < > 8.8   ALBUMIN g/dL  --  2.1*  --  2.6*   TOTAL BILIRUBIN mg/dL  --   --   -- 1.45*   ALK PHOS U/L  --   --   --  79   ALT U/L  --   --   --  23   AST U/L  --   --   --  39   GLUCOSE RANDOM mg/dL 191* 139   < > 112    < > = values in this interval not displayed.      Results from last 7 days   Lab Units 06/22/23  0355   INR  1.74*     Results from last 7 days   Lab Units 06/24/23  0618 06/23/23  2332 06/23/23  1747 06/23/23  1118 06/23/23  0549 06/22/23  2354 06/22/23  1602 06/22/23  1105 06/22/23  0623 06/21/23  2125 06/21/23  1623 06/21/23  1041   POC GLUCOSE mg/dl 194* 172* 152* 145* 157* 141* 89 96 97 108 122 123         Results from last 7 days   Lab Units 06/17/23  1217   LACTIC ACID mmol/L 1.6              Recent Cultures (last 7 days):           Last 24 Hours Medication List:   Current Facility-Administered Medications   Medication Dose Route Frequency Provider Last Rate   • bisacodyl  10 mg Rectal Daily Emeterio Cifuentes MD     • diphenhydramine, lidocaine, Al/Mg hydroxide, simethicone  10 mL Swish & Spit Q6H PRN Abril Bautista MD     • furosemide  40 mg Intravenous Daily Deyanira Gongora MD     • heparin (porcine)  5,000 Units Subcutaneous Q8H [address] Mikey Alejo MD     • HYDROmorphone  0.5 mg Intravenous Q4H PRN Mikey Alejo MD     • HYDROmorphone  0.2 mg Intravenous Q4H PRN Mikey Alejo MD     • insulin lispro  2-12 Units Subcutaneous Q6H ODESSA Virgen     • lidocaine  1 patch Topical Daily Abril Bautista MD     • lidocaine   Topical 4x Daily PRN Abril Bautista MD     • melatonin  6 mg Oral HS ODESSA Virgen     • pantoprazole  40 mg Intravenous Q24H Inocencio Kerr MD     • trimethobenzamide  200 mg Intramuscular Q6H PRN Abril Bautista MD       Facility-Administered Medications Ordered in Other Encounters   Medication Dose Route Frequency Provider Last Rate   • sodium chloride  75 mL/hr Intravenous Continuous Mekhi Willoughby MD Stopped (05/26/23 4354)        Today, Patient Was Seen By: Mikey Alejo MD    ** Please Note: Dictation voice to text software may have been used in the creation of this document.  **

## 2023-06-24 NOTE — PROGRESS NOTES
NEPHROLOGY PROGRESS NOTE   Manju Shearer 81 y.o. male MRN: [id number]  Unit/Bed#: [facility] 504-01 Encounter: [id number]      ASSESSMENT & PLAN:  1 oligoanuric acute kidney injury complicated with severe hyperkalemia and metabolic acidosis. Per previous note suspected secondary to septic ATN plus minus diuretic use plus minus contrast oxygen nephropathy. Status post emergent IHD on 6/14 for hyperkalemia. He was then transitioned to CRRT, CRRT discontinued on 6/19  Last HD treatment on 6/23. start Lasix 40 mg IV daily. Monitor ins and outs. Follow labs on Monday. Anticipate next dialysis treatment on Monday unless recovering. If not recovery then  has arranged for outpatient dialysis    2 shock, suspected mixed septic shock, acidosis, hypovolemic, resolved. Currently off inotropic/pressor support  Keep MAP more than 65 mmHg and systolic blood pressure over 120    3 septic shock with E. coli urosepsis, resolved, completed 7 days of ceftriaxone    4. hypophosphatemia, improved after replacement, last phosphorus 2.5 today, follow renal function panel on Monday    5 anemia, monitor H&H suspect a component of acute blood loss, possible GI bleeding status post transfusion, hemoglobin 8.1 this morning, follow CBC    6 severe acidosis, lactic acidosis, concern for metformin toxicity, resolved    7 chronic lower extremity wounds and lymphedema, start Lasix 40 mg IV daily    #8  CHF, A-fib, V. tach status post ICD, diuretics currently on hold, monitor volume status closely, start Lasix 40 mg IV daily monitor ins and outs    9 Access, right IJ PermCath placed by IR 6/19. And recommendation were discussed with internal medicine attending, she agreed with the plan, start Lasix 40 mg IV daily, monitor ins and outs, follow labs Monday for renal recovery.   Anticipate next dialysis treatment on Monday unless recover, if not recovery  has to arranged for outpatient dialysis      SUBJECTIVE:  Seen and examined, feeling tired, continues with on and off chronic lower back pain. Denies any chest pain, no shortness of breath, no nausea, no vomiting.   NG tube was placed and started on tube feeds    OBJECTIVE:  Current Weight: Weight - Scale: (!) 138 kg (304 lb 6.4 oz)  Vitals:    06/24/23 0734   BP: 144/73   Pulse: 103   Resp: 17   Temp: 99 °F (37.2 °C)   SpO2: 100%       Intake/Output Summary (Last 24 hours) at 6/24/2023 0940  Last data filed at 6/24/2023 2096  Gross per 24 hour   Intake 2316 ml   Output 2925 ml   Net -609 ml       General: Chronically ill, cooperative, in not acute distress  Eyes: conjunctivae pale, anicteric sclerae  ENT: lips and mucous membranes moist, tube feeds via NG tube  Neck: supple, no JVD  Chest: Slightly decreased breath sounds bases, no crackles, ronchus or wheezings  CVS: Mildly tachycardic  Abdomen: soft, non-tender, non-distended, normoactive bowel sounds  Extremities: 2+ edema of both legs  Skin: Chronic skin changes in lower extremities  Neuro: awake, alert, oriented  PermCath in place        Medications:    Current Facility-Administered Medications:   •  bisacodyl (DULCOLAX) rectal suppository 10 mg, 10 mg, Rectal, Daily, Emeterio Cifuentes MD, 10 mg at 06/24/23 0900  •  diphenhydramine, lidocaine, Al/Mg hydroxide, simethicone (Magic Mouthwash) oral solution 10 mL, 10 mL, Swish & Spit, Q6H PRN, Elizabeth Bautista MD  •  furosemide (LASIX) injection 40 mg, 40 mg, Intravenous, Daily, Tyler Huertas MD  •  heparin (porcine) subcutaneous injection 5,000 Units, 5,000 Units, Subcutaneous, Q8H [address], Shiv Navarro MD, 5,000 Units at 06/24/23 0608  •  HYDROmorphone (DILAUDID) injection 0.5 mg, 0.5 mg, Intravenous, Q4H PRN, Shiv Navarro MD, 0.5 mg at 06/24/23 0257  •  HYDROmorphone HCl (DILAUDID) injection 0.2 mg, 0.2 mg, Intravenous, Q4H PRN, Shiv Navarro MD  •  insulin lispro (HumaLOG) 100 units/mL subcutaneous injection 2-12 Units, 2-12 Units, Subcutaneous, Q6H, 2 Units at 06/24/23 7802 **AND** Fingerstick Glucose (POCT), , , Q6H, ODESSA Lindsay  •  lidocaine (LIDODERM) 5 % patch 1 patch, 1 patch, Topical, Daily, Adeola Bautista MD, 1 patch at 06/24/23 0901  •  lidocaine (LMX) 4 % cream, , Topical, 4x Daily PRN, Adeola Bautista MD, Given at 06/18/23 1640  •  melatonin tablet 6 mg, 6 mg, Oral, HS, ODESSA Lindsay, 6 mg at 06/23/23 2111  •  pantoprazole (PROTONIX) injection 40 mg, 40 mg, Intravenous, Q24H [address], Kaleb Lainez MD, 40 mg at 06/24/23 0900  •  trimethobenzamide (TIGAN) IM injection 200 mg, 200 mg, Intramuscular, Q6H PRN, Adeola Bautista MD, 200 mg at 06/18/23 1640    Facility-Administered Medications Ordered in Other Encounters:   •  sodium chloride 0.9 % infusion, 75 mL/hr, Intravenous, Continuous, Janessa Jones MD, Stopped at 05/26/23 1426    Invasive Devices:        Lab Results:   Results from last 7 days   Lab Units 06/24/23  0525 06/23/23  0435 06/22/23  0355 06/21/23  0517 06/20/23  0455 06/19/23  1446 06/19/23  0550   WBC Thousand/uL 10.36* 10.13 9.44 11.47* 10.96*  --  12.64*   HEMOGLOBIN g/dL 8.1* 7.8* 8.2* 8.2* 7.0*  --  7.3*   HEMATOCRIT % 26.4* 26.7* 26.5* 26.1* 22.9*  --  24.6*   PLATELETS Thousands/uL 136* 110* 92* 69* 67*   < > 35*   SODIUM mmol/L 139 140 139 138 138  --  138   POTASSIUM mmol/L 3.9 4.0 4.0 3.9 3.9  --  4.0   CHLORIDE mmol/L 108 108 106 106 108  --  108   CO2 mmol/L 28 27 30 27 26  --  27   BUN mg/dL 19 21 18 14 18  --  12   CREATININE mg/dL 1.63* 1.90* 1.90* 1.61* 1.83*  --  1.05   CALCIUM mg/dL 8.3 7.9* 8.1* 8.1* 8.7  --  8.8   MAGNESIUM mg/dL  --   --   --  1.8 1.9  --  2.0   PHOSPHORUS mg/dL  --  2.5  --  2.3 2.7  --  1.8*   ALK PHOS U/L  --   --   --   --   --   --  79   ALT U/L  --   --   --   --   --   --  23   AST U/L  --   --   --   --   --   --  39    < > = values in this interval not displayed. Previous work up:  See previous notes      Portions of the record may have been created with voice recognition software.  Occasional wrong word or "sound a like" substitutions may have occurred due to the inherent limitations of voice recognition software. Read the chart carefully and recognize, using context, where substitutions have occurred. If you have any questions, please contact the dictating provider.

## 2023-06-25 NOTE — PROGRESS NOTES
[address] [facility] (formerly [facility])  Progress Note  Name: Mary Solis  MRN: [id number]  [facility] Date of Admission: 6/14/2023   Date of Service: 6/25/2023 I Hospital Day: 11    Assessment/Plan   Fever  Assessment & Plan  Patient spiked a fever of 101.4  6/24/23. S/p  Rocephin x 7 days for urosepsis earlier in the admission. Currently afebrile  Denies abdominal pain, dyspnea, chest pain, nausea, vomiting. On exam, abdomen non tender, non distended. No obvious cellulitis noted on extremities on abdomen. CXR with mild pulmonary edema, procal and lactic acid negative  UA and repeat blood cultures pending  Continue to monitor off abx  Had positive COVID test two months ago. So low utility of repeating as it may be false positive      Acute low back pain  Assessment & Plan  Patient reported acutely worsening low back pain that is very severe, 10/10, low back across, no radiation. No weakness of lower extremity. Xray obtained: Limited study without a true lateral image. Unchanged severe L4 compression deformity. Backspace Progressive loss of height of the L2 vertebral body may represent an acute fracture. Consider CT follow-up. CT L spine scan shows acute L1 fracture and chronic L4 fracture  Pain control with IV pain meds. Neurosx consult appreciated, no surgery at this point  LSO brace    Dysphagia  Assessment & Plan  Patient had VBS with speech   · Unfortunately he is aspiration with both solid and liquid. · change to NPO, will change PO meds to IV and other routes. · Speech recommends PEG tube. · GI recommends trial of NG tube first prior to committing to PEG tube. · Started NG tube feeding 6/22, currently at goal, tolerating. · Will need repeat VBS at this admission to assess improvement. If fails VBS again, will need PEG    Septic shock ([address])  Assessment & Plan  2/2 MDR E.coli urosepsis. Briefly required ICU stay and pressor support. Currently off pressors.  Abx day 6, s/p 4 days of cefepime  · UCx shows Ecoli and klebsiella both susceptible to ceftriaxone  · Completed ceftriaxone for a total abx duration of 7 days. Thrombocytopenia ([address])  Assessment & Plan  Admission platelets of 241. Steady downtrend this admission, saranya at 35. One instance of melena documented on 6/16 but at the time patient had platelete count in the 120k-130k. Unclear eitiology but likely 2/2 acute sepsis vs CVVH and filter clotting. Patient did receive heparin intermittently this admission however platelets have not responded to discontinuation of heparin products. Possibly cephalosporin induced, but patient is s/p 4 days of cefepime and then transitioned to ceftriaxone. · Likely 2/2 sepsis  · No acute signs of bleeding on exam  · S/p 1 U platelets 5/70 in preparation for IR permacath placement  · Trend CBC  · Resumed Heparin SC. Continue to monitor for platelet  · uptrending platelets noted      Prolonged QT interval  Assessment & Plan  Noted on EKG. Avoid QTC prolonging medications. CHF (congestive heart failure) (HCC)  Assessment & Plan  Wt Readings from Last 3 Encounters:   06/25/23 (!) 136 kg (300 lb 7.8 oz)   06/07/23 136 kg (299 lb 13.2 oz)   06/02/23 130 kg (285 lb 8 oz)       Patient with a hx of HFpEF70%. · Patient euvolemic after CVVH  · Currently holding home torsemide in the setting of anuria. · permacath placed for intermittent HD  · D/w nephro, on IV lasix      History of ventricular tachycardia  Assessment & Plan  S/p DUAL CHAMBER ICD/ NOT MRI CONDITIONAL . 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 · Warfarin held in the setting of thrombocytopenia  · Patient currently rate controlled, continuing to hold sotalol in the setting of anuria. Acute on chronic anemia  Assessment & Plan  Patient with chronic macrocytic anemia baseline 7-8.    · Required 2U PRBC this admission  · Goal Hgb >7, replete as necessary  · EGD completed: Single small, superficial ulcer in the 1st part of the duodenum with clean base   · hgb stable 8.1, repeat pending  · Continue PPI  · Watch for s/s of GI bleed  · Resume DVT ppx with heparin and trend hgb. · Continue to hold full AC with coumadin for possible PEG placement next week. Diabetes [facility])  Assessment & Plan  Lab Results   Component Value Date    HGBA1C 5.0 06/15/2023       Recent Labs     06/24/23  1750 06/24/23  2355 06/25/23  0505 06/25/23  0646   POCGLU 197* 175* 162* 187*       Blood Sugar Average: Last 72 hrs:  (P) 153.5   Lab Results   Component Value Date    HGBA1C 5.0 06/15/2023       Recent Labs     06/24/23  1750 06/24/23  2355 06/25/23  0505 06/25/23  0646   POCGLU 197* 175* 162* 187*       Blood Sugar Average: Last 72 hrs:  (P) 153.5     Plan:  · Home regimen of metformin held in the setting of REMA and metabolic acidosis. · Continue with SSI QID       * REMA (acute kidney injury) [facility])  Assessment & Plan  Patient downgraded from critical care, admitted for septic shock secondary to UTI leading to REMA requiring dialysis. · Likely UTI was the initial cause of REMA, exacerbated by concurrent metformin and sotolol use. · Combs has been removed  · monitoring bladder with q6h bladder scans  · CVVH discontinued 6/19  · tunneled dialysis catheter placed on 6/19, on intermittent hemodialysis  · Nephrology following   · Patient receiving dialysis today. · Plan to resume home diuretics today however due to NPO, start IV lasix 40 mg daily x 3 doses. · Nephrology will re-assess renal fx on Monday and decide whether he will need routine HD and outpatient HD chair               VTE Pharmacologic Prophylaxis:   Moderate Risk (Score 3-4) - Pharmacological DVT Prophylaxis Ordered: heparin. Patient Centered Rounds: I performed bedside rounds with nursing staff today.   Discussions with Specialists or Other Care Team Provider: YOSEF    Education and Discussions with Family / Patient: Updated  (wife) at bedside. Total Time Spent on Date of Encounter in care of patient: 45 minutes This time was spent on one or more of the following: performing physical exam; counseling and coordination of care; obtaining or reviewing history; documenting in the medical record; reviewing/ordering tests, medications or procedures; communicating with other healthcare professionals and discussing with patient's family/caregivers. Current Length of Stay: 11 day(s)  Current Patient Status: Inpatient   Certification Statement: The patient will continue to require additional inpatient hospital stay due to IV abx and IV diuresis  Discharge Plan: Anticipate discharge in 48-72 hrs to discharge location to be determined pending rehab evaluations. Code Status: Level 1 - Full Code    Subjective:   Pt reports some back pain. Denies chest pain, sob, abdominal pain. Objective:     Vitals:   Temp (24hrs), Av.3 °F (37.4 °C), Min:98 °F (36.7 °C), Max:101.4 °F (38.6 °C)    Temp:  [98 °F (36.7 °C)-101.4 °F (38.6 °C)] 98.8 °F (37.1 °C)  HR:  [] 108  Resp:  [14-19] 18  BP: (122-136)/(42-65) 123/65  SpO2:  [95 %-98 %] 97 %  Body mass index is 39.64 kg/m². Input and Output Summary (last 24 hours): Intake/Output Summary (Last 24 hours) at 2023 1057  Last data filed at 2023 1021  Gross per 24 hour   Intake 1387 ml   Output 1849 ml   Net -462 ml       Physical Exam:   Physical Exam  Vitals reviewed. Constitutional:       General: He is not in acute distress. Appearance: He is well-developed. He is obese. He is not diaphoretic. HENT:      Head: Normocephalic and atraumatic. Mouth/Throat:      Pharynx: No oropharyngeal exudate. Eyes:      General: No scleral icterus. Extraocular Movements: Extraocular movements intact. Conjunctiva/sclera: Conjunctivae normal.   Neck:      Vascular: No JVD. Trachea: No tracheal deviation.    Cardiovascular: Rate and Rhythm: Normal rate and regular rhythm. Heart sounds: No murmur heard. No friction rub. No gallop. Pulmonary:      Effort: Pulmonary effort is normal. No respiratory distress. Breath sounds: No stridor. No wheezing. Abdominal:      General: There is no distension. Palpations: Abdomen is soft. There is no mass. Tenderness: There is no abdominal tenderness. There is no right CVA tenderness or left CVA tenderness. Musculoskeletal:         General: No tenderness. Right lower leg: No edema. Left lower leg: No edema. Skin:     General: Skin is warm. Coloration: Skin is pale. Findings: No erythema. Neurological:      Mental Status: Mental status is at baseline. Psychiatric:         Behavior: Behavior normal.         Thought Content: Thought content normal.         Additional Data:     Labs:  Results from last 7 days   Lab Units 06/24/23  0525 06/21/23  0517 06/20/23  0455   WBC Thousand/uL 10.36*   < > 10.96*   HEMOGLOBIN g/dL 8.1*   < > 7.0*   HEMATOCRIT % 26.4*   < > 22.9*   PLATELETS Thousands/uL 136*   < > 67*   NEUTROS PCT %  --   --  74   LYMPHS PCT %  --   --  13*   MONOS PCT %  --   --  9   EOS PCT %  --   --  2    < > = values in this interval not displayed.      Results from last 7 days   Lab Units 06/25/23  0952 06/24/23  0525 06/23/23  0435 06/20/23  0455 06/19/23  0550   SODIUM mmol/L 137   < > 140   < > 138   POTASSIUM mmol/L 5.8*   < > 4.0   < > 4.0   CHLORIDE mmol/L 111*   < > 108   < > 108   CO2 mmol/L 26   < > 27   < > 27   BUN mg/dL 25   < > 21   < > 12   CREATININE mg/dL 1.62*   < > 1.90*   < > 1.05   ANION GAP mmol/L 0   < > 5   < > 3*   CALCIUM mg/dL 8.2*   < > 7.9*   < > 8.8   ALBUMIN g/dL  --   --  2.1*  --  2.6*   TOTAL BILIRUBIN mg/dL  --   --   --   --  1.45*   ALK PHOS U/L  --   --   --   --  79   ALT U/L  --   --   --   --  23   AST U/L  --   --   --   --  39   GLUCOSE RANDOM mg/dL 148*   < > 139   < > 112    < > = values in this interval not displayed. Results from last 7 days   Lab Units 06/22/23  0355   INR  1.74*     Results from last 7 days   Lab Units 06/25/23  0646 06/25/23  0505 06/24/23  2355 06/24/23  1750 06/24/23  1136 06/24/23  0618 06/23/23  2332 06/23/23  1747 06/23/23  1118 06/23/23  0549 06/22/23  2354 06/22/23  1602   POC GLUCOSE mg/dl 187* 162* 175* 197* 185* 194* 172* 152* 145* 157* 141* 89         Results from last 7 days   Lab Units 06/25/23  0555 06/24/23  1404   LACTIC ACID mmol/L  --  1.9   PROCALCITONIN ng/ml 0.20 0.19       Lines/Drains:  Invasive Devices     Peripheral Intravenous Line  Duration           Peripheral IV 06/22/23 Dorsal (posterior); Right Hand 3 days          Hemodialysis Catheter  Duration           HD Permanent Double Catheter 5 days          Drain  Duration           NG/OG Tube Nasogastric Left nare 2 days                      Imaging: No pertinent imaging reviewed. Recent Cultures (last 7 days):   Results from last 7 days   Lab Units 06/24/23  1405   BLOOD CULTURE  Received in Microbiology Lab. Culture in Progress. Received in Microbiology Lab. Culture in Progress.        Last 24 Hours Medication List:   Current Facility-Administered Medications   Medication Dose Route Frequency Provider Last Rate   • acetaminophen  650 mg Per NG Tube Q4H PRN Marybeth White MD     • bisacodyl  10 mg Rectal Daily Marybeth White MD     • diphenhydramine, lidocaine, Al/Mg hydroxide, simethicone  10 mL Swish & Spit Q6H PRN Fozia Bautista MD     • furosemide  40 mg Intravenous Daily Ozzy Bowles MD     • heparin (porcine)  5,000 Units Subcutaneous Q8H [address] Marybeth White MD     • HYDROmorphone  0.5 mg Intravenous Q4H PRN Marybeth White MD     • HYDROmorphone  0.2 mg Intravenous Q4H PRN Marybeth White MD     • insulin lispro  2-12 Units Subcutaneous Q6H Treyeimy Em, CRNP     • lidocaine  1 patch Topical Daily Leonorrl Mode MD Lily     • lidocaine   Topical 4x Daily PRN Leonorrl Mode MD Lily     • melatonin 6 mg Oral HS ODESSA Knapp     • nystatin   Topical BID Lashae Paredes DO     • pantoprazole  40 mg Intravenous Q24H [address] Emeterio Cifuentes MD     • trimethobenzamide  200 mg Intramuscular Q6H PRN Grey Bautista MD       Facility-Administered Medications Ordered in Other Encounters   Medication Dose Route Frequency Provider Last Rate   • sodium chloride  75 mL/hr Intravenous Continuous Matt Mittal MD Stopped (05/26/23 2177)        Today, Patient Was Seen By: Les Masters DO    **Please Note: This note may have been constructed using a voice recognition system. **

## 2023-06-25 NOTE — ASSESSMENT & PLAN NOTE
Lab Results   Component Value Date    HGBA1C 5.0 06/15/2023       Recent Labs     06/24/23  1750 06/24/23  2355 06/25/23  0505 06/25/23  0646   POCGLU 197* 175* 162* 187*       Blood Sugar Average: Last 72 hrs:  (P) 153.5   Lab Results   Component Value Date    HGBA1C 5.0 06/15/2023       Recent Labs     06/24/23  1750 06/24/23  2355 06/25/23  0505 06/25/23  0646   POCGLU 197* 175* 162* 187*       Blood Sugar Average: Last 72 hrs:  (P) 153.5     Plan:  · Home regimen of metformin held in the setting of REMA and metabolic acidosis.    · Continue with SSI QID

## 2023-06-25 NOTE — ASSESSMENT & PLAN NOTE
2/2 MDR E.coli urosepsis. Briefly required ICU stay and pressor support. Currently off pressors. Abx day 6, s/p 4 days of cefepime  · UCx shows Ecoli and klebsiella both susceptible to ceftriaxone  · Completed ceftriaxone for a total abx duration of 7 days.

## 2023-06-25 NOTE — ASSESSMENT & PLAN NOTE
Patient had VBS with speech   · Unfortunately he is aspiration with both solid and liquid. · change to NPO, will change PO meds to IV and other routes. · Speech recommends PEG tube. · GI recommends trial of NG tube first prior to committing to PEG tube. · Started NG tube feeding 6/22, currently at goal, tolerating. · Will need repeat VBS at this admission to assess improvement.  If fails VBS again, will need PEG

## 2023-06-25 NOTE — ASSESSMENT & PLAN NOTE
Admission platelets of 451. Steady downtrend this admission, saranya at 35. One instance of melena documented on 6/16 but at the time patient had platelete count in the 120k-130k. Unclear eitiology but likely 2/2 acute sepsis vs CVVH and filter clotting. Patient did receive heparin intermittently this admission however platelets have not responded to discontinuation of heparin products. Possibly cephalosporin induced, but patient is s/p 4 days of cefepime and then transitioned to ceftriaxone. · Likely 2/2 sepsis  · No acute signs of bleeding on exam  · S/p 1 U platelets 0/84 in preparation for IR permacath placement  · Trend CBC  · Resumed Heparin SC.  Continue to monitor for platelet  · uptrending platelets noted

## 2023-06-25 NOTE — CONSULTS
Consultation - Palliative and Supportive Care   Oliver Mcdonald 81 y.o. male [id number]    Patient Active Problem List   Diagnosis   • Obesity, unspecified obesity severity, unspecified obesity type   • Diabetes ([address])   • Hydrocele   • Chronic anticoagulation   • Acute on chronic anemia   • Atrial fibrillation (HCC)   • History of AVR   • PVD (peripheral vascular disease) ([facility])   • Thrombophlebitis   • History of ventricular tachycardia   • Edema   • Acute on chronic diastolic CHF   • Venous ulcer of right ankle   • Peripheral venous insufficiency   • Ventricular tachycardia   • Secondary lymphedema   • CKD (chronic kidney disease) stage 3, GFR 30-59 ml/min ([facility])   • Morbid obesity   • Benign prostatic hyperplasia with lower urinary tract symptoms   • Acute cholecystitis   • REMA (acute kidney injury) ([address])   • Cardiomegaly   • Chronic bilateral low back pain without sciatica   • 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   • Dyslipidemia   • Endocarditis   • Hyperlipidemia   • Osteoarthritis, knee   • Renal cyst   • Status post right knee replacement   • Swelling of limb   • BPH (benign prostatic hyperplasia)   • History of venous thromboembolism   • Acute on chronic cholecystitis   • Cholecystostomy care [facility])   • Gout   • Calculus of gallbladder   • Extrahepatic biloma   • COVID-19   • Pleural effusion on right   • Melena   • Pulmonary embolism (HCC)   • CHF (congestive heart failure) (HCC)   • Fall   • Acidosis   • Hyponatremia   • Hypophosphatemia   • Prolonged QT interval   • Thrombocytopenia (HCC)   • Septic shock (HCC)   • Dysphagia   • Acute low back pain   • Fever     Active issues specifically addressed today include:  Dysphagia, NG tube in place, VVS on Monday  Severe sepsis secondary to UTI  Acute on chronic low back pain  Heart failure preserved EF, EF 70%  REMA, currently requiring dialysis  Palliative care patient  Frailty in an elderly adult  Goals of care    Plan:  1.  Symptom management -   -Agree with low-dose opioids and Lidoderm patch   -Will schedule Tylenol via NG tube every 6 hours   -Patient at risk for delirium given age and co-morbidities, recommend global delirium precautions as follows:  Establishment of day/night cycle via lights during the day and blinds open. Please limit interruptions at night as medically appropriate. Patient should be out of bed during the day as tolerated or medically indicated. Provide glasses/hearing aids as appropriate. Minimize deliriogenic meds as able. Provide reorientation including date on board and visible clock. Avoid restraints as able, frequent verbal reorientations or patient care sitter as appropriate. Consider use of melatonin qHS for circadian rhythm maintenance. 2. Goals -introduced role of palliative medicine to patient and spouse at bedside. They do note that they have some confusion about everything that is going on as he has been here for several days now. Discussed having a multidisciplinary team meeting and they are in agreement to this. Spouse is here every day after 9:00 and is amenable to whenever the team can meet. We will plan to arrange team meeting during the workweek       3. Psychosocial support -support provided       4. PSC [id number] will follow       Code Status: Full- Level 1   Decisional apparatus:  Patient is competent on my exam today. If competence is lost, patient's substitute decision maker would default to spouse by [place] Act 169. Advance Directive / Living Will / POLST: None on file     I have reviewed the patient's controlled substance dispensing history in the Prescription Drug Monitoring Program in compliance with the [facility] regulations before prescribing any controlled substances. We appreciate the invitation to be involved in this patient's care. We will follow.   Please do not hesitate to reach our on call provider through our clinic answering service at [phone] should you have acute symptom control concerns. Sudhir Baird DO  Palliative and Supportive Care  Clinic/Answering Service: [phone]  You can find me on [employer]! IDENTIFICATION:  Inpatient consult to Palliative Care  Consult performed by: Sudhir Baird DO  Consult ordered by: El Duarte MD        Physician Requesting Consult: Aurelio Thomas DO  Reason for Consult / Principal Problem: Goals  Hx and PE limited by: N/A    HISTORY OF PRESENT ILLNESS:       Monica Fox is a 81 y.o. male who presented on June 14 for generalized weakness. Patient has known history of diastolic heart failure, atrial fibrillation, ICD placement, and other medical history as below. Patient was found to be in septic shock with significant electrolyte derangements including a potassium greater than 7 and widening QRS complex on telemetry. Patient was urgently put on dialysis and admitted to the ICU. Patient has had ongoing hospital stay current active issues being a new fever overnight, worked up further at this time. Also with dysphagia and failed VBS currently with NG tube in place with tube feeds at goal.  Also with ongoing dialysis needs, unsure if he will need long-term HD or if you allow for renal recovery    Patient seen today with his spouse at bedside. He seems to have a general idea of everything that is going on but does tire easily. He does tell me that he just wants to improve and go home. Did discuss the current NG tube in place and he notes that it is uncomfortable and he is unsure if he would consent to long-term feeding tube placement. Patient and spouse though have many questions about everything that is going on and I suggested a multidisciplinary team meeting and they agreed to this. Patient is well supported by his spouse. Next month they will have been  for 76 years. They have 5 daughters and 15 grandchildren.     Review of Systems   Constitutional: Positive for decreased appetite. Hematologic/Lymphatic: Bruises/bleeds easily. Musculoskeletal: Positive for muscle weakness. Neurological: Positive for weakness. Psychiatric/Behavioral: The patient is not nervous/anxious. All other systems reviewed and are negative.       Past Medical History:   Diagnosis Date   • Anemia    • Arthritis    • Atrial fibrillation ([address])    • Basal cell carcinoma 03/22/2022    Tip of Nose   • CHF (congestive heart failure) (HCC)    • Diabetes mellitus ([address])     Niddm   • DVT (deep vein thrombosis) in pregnancy 1966    not in pregnancy   • DVT (deep venous thrombosis) ([address]) 1966   • Dyslipidemia    • Encephalopathy acute 11/4/2022   • GERD (gastroesophageal reflux disease)    • Hyperlipidemia    • Hypertension    • Hypomagnesemia 10/19/2022   • Irregular heart beat     Afib   • Morbid obesity due to excess calories ([address])     Resolved 9/2/2014    • Pulmonary embolism (HCC)    • Sepsis ([address])    • Squamous cell skin cancer 07/30/2020    Left posterior scalp   • Visual impairment      Past Surgical History:   Procedure Laterality Date   • AORTIC VALVE REPLACEMENT     • CARDIAC DEFIBRILLATOR PLACEMENT  04/2014   • CARDIAC SURGERY  02/2014    AVR   • COLONOSCOPY     • INSERT / REPLACE / REMOVE PACEMAKER     • IR ASPIRATION BAKERS CYST  3/8/2023   • IR CHOLECYSTOSTOMY TUBE CHECK/CHANGE/REPOSITION/REINSERTION/UPSIZE  10/13/2022   • IR CHOLECYSTOSTOMY TUBE CHECK/CHANGE/REPOSITION/REINSERTION/UPSIZE  10/19/2022   • IR CHOLECYSTOSTOMY TUBE CHECK/CHANGE/REPOSITION/REINSERTION/UPSIZE  10/27/2022   • IR CHOLECYSTOSTOMY TUBE CHECK/CHANGE/REPOSITION/REINSERTION/UPSIZE  11/7/2022   • IR CHOLECYSTOSTOMY TUBE CHECK/CHANGE/REPOSITION/REINSERTION/UPSIZE  11/10/2022   • IR CHOLECYSTOSTOMY TUBE CHECK/CHANGE/REPOSITION/REINSERTION/UPSIZE  12/1/2022   • IR CHOLECYSTOSTOMY TUBE CHECK/CHANGE/REPOSITION/REINSERTION/UPSIZE  1/6/2023   • IR CHOLECYSTOSTOMY TUBE CHECK/CHANGE/REPOSITION/REINSERTION/UPSIZE  1/24/2023   • IR CHOLECYSTOSTOMY TUBE CHECK/CHANGE/REPOSITION/REINSERTION/UPSIZE  3/15/2023   • IR CHOLECYSTOSTOMY TUBE PLACEMENT  9/1/2022   • IR DRAINAGE TUBE CHECK AND/OR REMOVAL  3/22/2023   • IR DRAINAGE TUBE CHECK AND/OR REMOVAL  4/10/2023   • IR DRAINAGE TUBE PLACEMENT  4/6/2023   • IR TUNNELED DIALYSIS CATHETER PLACEMENT  6/19/2023   • JOINT REPLACEMENT Left     LTKR   • MOHS SURGERY  07/30/2020    Left posterior scalp, Dr. Musa Thomas   • [address]  04/18/2022    BCC Tip of Nose- Dr. Musa Thomas   • [place] DIVJ&STRIP LONG SAPH SAPHFEM [place] Right 8/17/2018    Procedure: LEG PERFORATED INJECTION SCLEROTHERAPY;  Surgeon: Cisco Blevins MD;  Location: [facility];  Service: Vascular   • REPLACEMENT TOTAL KNEE Right    • TOTAL KNEE ARTHROPLASTY Left    • VASCULAR SURGERY     • VENA CAVA FILTER PLACEMENT      Interruption inferior vena cava, Decker filter, placement   • WISDOM TOOTH EXTRACTION       Social History     Socioeconomic History   • Marital status: /Civil Union     Spouse name: Not on file   • Number of children: Not on file   • Years of education: Not on file   • Highest education level: Not on file   Occupational History   • Not on file   Tobacco Use   • Smoking status: Never   • Smokeless tobacco: Never   Vaping Use   • Vaping Use: Never used   Substance and Sexual Activity   • Alcohol use: Not Currently     Comment: no alcohol in 28 yrs   • Drug use: Never   • Sexual activity: Yes     Partners: Female     Birth control/protection: None   Other Topics Concern   • Not on file   Social History Narrative   • Not on file     Social Determinants of Health     Financial Resource Strain: Not on file   Food Insecurity: No Food Insecurity (6/15/2023)    Hunger Vital Sign    • Worried About Running Out of Food in the Last Year: Never true    • Ran Out of Food in the Last Year: Never true   Transportation Needs: No Transportation Needs (6/15/2023)    PRAPARE - Transportation    • Lack of Transportation (Medical): No    • Lack of Transportation (Non-Medical):  No   Physical Activity: Not on file   Stress: Not on file   Social Connections: Not on file   Intimate Partner Violence: Not on file   Housing Stability: Low Risk  (6/15/2023)    Housing Stability Vital Sign    • Unable to Pay for Housing in the Last Year: No    • Number of Places Lived in the Last Year: 1    • Unstable Housing in the Last Year: No     Family History   Problem Relation Age of Onset   • Arthritis Mother    • Stroke Mother    • Arthritis Father    • Cancer Father    • Arthritis Daughter        MEDICATIONS / ALLERGIES:    all current active meds have been reviewed and current meds:   Current Facility-Administered Medications   Medication Dose Route Frequency   • acetaminophen (TYLENOL) oral suspension 650 mg  650 mg Per NG Tube Q6H   • bisacodyl (DULCOLAX) rectal suppository 10 mg  10 mg Rectal Daily   • diphenhydramine, lidocaine, Al/Mg hydroxide, simethicone (Magic Mouthwash) oral solution 10 mL  10 mL Swish & Spit Q6H PRN   • furosemide (LASIX) injection 40 mg  40 mg Intravenous Daily   • heparin (porcine) subcutaneous injection 5,000 Units  5,000 Units Subcutaneous Q8H [address]   • HYDROmorphone (DILAUDID) injection 0.5 mg  0.5 mg Intravenous Q4H PRN   • HYDROmorphone HCl (DILAUDID) injection 0.2 mg  0.2 mg Intravenous Q4H PRN   • insulin lispro (HumaLOG) 100 units/mL subcutaneous injection 2-12 Units  2-12 Units Subcutaneous Q6H   • lidocaine (LIDODERM) 5 % patch 1 patch  1 patch Topical Daily   • lidocaine (LMX) 4 % cream   Topical 4x Daily PRN   • melatonin tablet 6 mg  6 mg Oral HS   • nystatin (MYCOSTATIN) powder   Topical BID   • pantoprazole (PROTONIX) injection 40 mg  40 mg Intravenous Q24H AMANDA   • trimethobenzamide (TIGAN) IM injection 200 mg  200 mg Intramuscular Q6H PRN     Facility-Administered Medications Ordered in Other Encounters   Medication Dose Route Frequency   • sodium chloride 0.9 % infusion  75 mL/hr Intravenous Continuous Allergies   Allergen Reactions   • Tramadol Other (See Comments)     intolerance       OBJECTIVE:    Physical Exam  Physical Exam  Vitals and nursing note reviewed. Constitutional:       General: He is not in acute distress. Appearance: He is obese. He is ill-appearing. HENT:      Head: Normocephalic and atraumatic. Right Ear: External ear normal.      Left Ear: External ear normal.      Nose: Nose normal.   Eyes:      General: No scleral icterus. Right eye: No discharge. Left eye: No discharge. Cardiovascular:      Rate and Rhythm: Normal rate and regular rhythm. Pulmonary:      Effort: Pulmonary effort is normal. No respiratory distress. Breath sounds: Normal breath sounds. Abdominal:      General: Bowel sounds are normal. There is no distension. Palpations: Abdomen is soft. Skin:     General: Skin is warm and dry. Coloration: Skin is pale. Neurological:      Mental Status: He is alert and oriented to person, place, and time. Lab Results:   I have personally reviewed pertinent labs. , CBC: No results found for: "WBC", "HGB", "HCT", "MCV", "PLT", "ADJUSTEDWBC", "RBC", "MCH", "MCHC", "RDW", "MPV", "NRBC", CMP:   Lab Results   Component Value Date    SODIUM 137 06/25/2023    K 5.8 (H) 06/25/2023     (H) 06/25/2023    CO2 26 06/25/2023    BUN 25 06/25/2023    CREATININE 1.62 (H) 06/25/2023    CALCIUM 8.2 (L) 06/25/2023    EGFR 37 06/25/2023   , PT/PTT:No results found for: "PT", "PTT"  Imaging Studies: Reviewed imaging  EKG, Pathology, and Other Studies: Reviewed    Counseling / Coordination of Care    Total floor / unit time spent today 55+ minutes. Greater than 50% of total time was spent with the patient and / or family counseling and / or coordination of care. A description of the counseling / coordination of care: Chart review, medication review, review of palliative care services, goals of care, supportive listening. .    Portions of this document may have been created using dictation software and as such some "sound alike" terms may have been generated by the system. Do not hesitate to contact me with any questions or clarifications.

## 2023-06-25 NOTE — ASSESSMENT & PLAN NOTE
Patient downgraded from critical care, admitted for septic shock secondary to UTI leading to REMA requiring dialysis. · Likely UTI was the initial cause of REMA, exacerbated by concurrent metformin and sotolol use. · Combs has been removed  · monitoring bladder with q6h bladder scans  · CVVH discontinued 6/19  · tunneled dialysis catheter placed on 6/19, on intermittent hemodialysis  · Nephrology following   · Patient receiving dialysis today. · Plan to resume home diuretics today however due to NPO, start IV lasix 40 mg daily x 3 doses.    · Nephrology will re-assess renal fx on Monday and decide whether he will need routine HD and outpatient HD chair

## 2023-06-25 NOTE — ASSESSMENT & PLAN NOTE
Patient with chronic macrocytic anemia baseline 7-8. · Required 2U PRBC this admission  · Goal Hgb >7, replete as necessary  · EGD completed: Single small, superficial ulcer in the 1st part of the duodenum with clean base   · hgb stable 8.1, repeat pending  · Continue PPI  · Watch for s/s of GI bleed  · Resume DVT ppx with heparin and trend hgb. · Continue to hold full AC with coumadin for possible PEG placement next week.

## 2023-06-25 NOTE — PROGRESS NOTES
NEPHROLOGY PROGRESS NOTE   Jessica Montes 81 y.o. male MRN: [id number]  Unit/Bed#: [facility] 504-01 Encounter: [id number]      ASSESSMENT & PLAN:  1 oligoanuric acute kidney injury complicated with severe hyperkalemia and metabolic acidosis. Per previous note suspected secondary to septic ATN plus minus diuretic use plus minus contrast oxygen nephropathy. Status post emergent IHD on 6/14 for hyperkalemia. He was then transitioned to CRRT, CRRT discontinued on 6/19  Last HD treatment on 6/23. Continue Lasix 40 mg IV daily. Avoid relative hypotension  Follow labs in the morning and monitor ins and outs. Will reassess for HD tomorrow morning   IF not recovery then  will have to arrange for outpatient dialysis    2 shock, suspected mixed septic shock, acidosis, hypovolemic, resolved. Currently off inotropic/pressor support  Keep MAP more than 65 mmHg and systolic blood pressure over 120    3 septic shock with E. coli urosepsis, resolved, completed 7 days of ceftriaxone noted to have episode of fever yesterday, procalcitonin checked and was normal    4. hypophosphatemia, improved after replacement, last phosphorus 2.5 today, follow renal function panel tomorrow    5 anemia, monitor H&H suspect a component of acute blood loss, possible GI bleeding status post transfusion, hemoglobin 8.1 on 6/24, follow CBC    6 severe acidosis, lactic acidosis, concern for metformin toxicity, resolved    7 chronic lower extremity wounds and lymphedema, start Lasix 40 mg IV daily    #8  CHF, A-fib, V. tach status post ICD, diuretics currently on hold, monitor volume status closely, continue Lasix 40 mg IV daily monitor ins and outs    9 Access, right IJ PermCath placed by IR 6/19.           SUBJECTIVE:  Seen and examined, complaining of chronic lower back pain, denies significant chest pain, no shortness of breath, feeling tired, no abdominal pain, no nausea, no vomiting  Febrile yesterday    OBJECTIVE:  Current Weight: Weight - Scale: (!) 136 kg (300 lb 7.8 oz)  Vitals:    06/25/23 0701   BP: 123/65   Pulse: (!) 108   Resp:    Temp: 98.8 °F (37.1 °C)   SpO2: 97%       Intake/Output Summary (Last 24 hours) at 6/25/2023 2306  Last data filed at 6/25/2023 2418  Gross per 24 hour   Intake 1387 ml   Output 993 ml   Net 394 ml       General: conscious, cooperative, in not acute distress  Eyes: conjunctivae pale, anicteric sclerae  ENT: lips and mucous membranes moist, NG tube in place  Neck: supple, no JVD  Chest: Slightly decreased breath sounds at bases, no crackles, ronchus or wheezings  CVS: Mildly tachycardic  Abdomen: Obese, non-tender, non-distended, normoactive bowel sounds  Extremities: Chronic lymphedema  Skin: Chronic skin changes secondary to lymphedema  Neuro: awake, alert, oriented          Medications:    Current Facility-Administered Medications:   •  acetaminophen (TYLENOL) oral suspension 650 mg, 650 mg, Per NG Tube, Q4H PRN, Dax Leal MD, 650 mg at 06/24/23 1347  •  bisacodyl (DULCOLAX) rectal suppository 10 mg, 10 mg, Rectal, Daily, Emeterio Cifuentes MD, 10 mg at 06/24/23 0900  •  diphenhydramine, lidocaine, Al/Mg hydroxide, simethicone (Magic Mouthwash) oral solution 10 mL, 10 mL, Swish & Spit, Q6H PRN, Cherelle Bautista MD  •  furosemide (LASIX) injection 40 mg, 40 mg, Intravenous, Daily, Nicolas Forman MD, 40 mg at 06/25/23 2897  •  heparin (porcine) subcutaneous injection 5,000 Units, 5,000 Units, Subcutaneous, Q8H [address], Emeterio Cifuentes MD, 5,000 Units at 06/25/23 0506  •  HYDROmorphone (DILAUDID) injection 0.5 mg, 0.5 mg, Intravenous, Q4H PRN, Dax Leal MD, 0.5 mg at 06/24/23 1332  •  HYDROmorphone HCl (DILAUDID) injection 0.2 mg, 0.2 mg, Intravenous, Q4H PRN, Dax Leal MD, 0.2 mg at 06/24/23 2109  •  insulin lispro (HumaLOG) 100 units/mL subcutaneous injection 2-12 Units, 2-12 Units, Subcutaneous, Q6H, 2 Units at 06/25/23 0506 **AND** Fingerstick Glucose (POCT), , , Q6H, ODESSA Malik  •  lidocaine (LIDODERM) 5 % patch 1 patch, 1 patch, Topical, Daily, James Bautista MD, 1 patch at 06/25/23 0815  •  lidocaine (LMX) 4 % cream, , Topical, 4x Daily PRN, James Bautista MD, Given at 06/18/23 1640  •  melatonin tablet 6 mg, 6 mg, Oral, HS, Carolynn Medeirose, CRNP, 6 mg at 06/24/23 2100  •  pantoprazole (PROTONIX) injection 40 mg, 40 mg, Intravenous, Q24H [address], Sania Melgar MD, 40 mg at 06/25/23 0532  •  trimethobenzamide (TIGAN) IM injection 200 mg, 200 mg, Intramuscular, Q6H PRN, James Bautista MD, 200 mg at 06/18/23 1640    Facility-Administered Medications Ordered in Other Encounters:   •  sodium chloride 0.9 % infusion, 75 mL/hr, Intravenous, Continuous, Dexter Mcallister MD, Stopped at 05/26/23 1426    Invasive Devices:        Lab Results:   Results from last 7 days   Lab Units 06/24/23  0525 06/23/23  0435 06/22/23  0355 06/21/23  0517 06/20/23  0455 06/19/23  1446 06/19/23  0550   WBC Thousand/uL 10.36* 10.13 9.44 11.47* 10.96*  --  12.64*   HEMOGLOBIN g/dL 8.1* 7.8* 8.2* 8.2* 7.0*  --  7.3*   HEMATOCRIT % 26.4* 26.7* 26.5* 26.1* 22.9*  --  24.6*   PLATELETS Thousands/uL 136* 110* 92* 69* 67*   < > 35*   SODIUM mmol/L 139 140 139 138 138  --  138   POTASSIUM mmol/L 3.9 4.0 4.0 3.9 3.9  --  4.0   CHLORIDE mmol/L 108 108 106 106 108  --  108   CO2 mmol/L 28 27 30 27 26  --  27   BUN mg/dL 19 21 18 14 18  --  12   CREATININE mg/dL 1.63* 1.90* 1.90* 1.61* 1.83*  --  1.05   CALCIUM mg/dL 8.3 7.9* 8.1* 8.1* 8.7  --  8.8   MAGNESIUM mg/dL  --   --   --  1.8 1.9  --  2.0   PHOSPHORUS mg/dL  --  2.5  --  2.3 2.7  --  1.8*   ALK PHOS U/L  --   --   --   --   --   --  79   ALT U/L  --   --   --   --   --   --  23   AST U/L  --   --   --   --   --   --  39    < > = values in this interval not displayed. Previous work up:  See previous notes      Portions of the record may have been created with voice recognition software.  Occasional wrong word or "sound a like" substitutions may have occurred due to the inherent limitations of voice recognition software. Read the chart carefully and recognize, using context, where substitutions have occurred. If you have any questions, please contact the dictating provider.

## 2023-06-25 NOTE — ASSESSMENT & PLAN NOTE
Wt Readings from Last 3 Encounters:   06/25/23 (!) 136 kg (300 lb 7.8 oz)   06/07/23 136 kg (299 lb 13.2 oz)   06/02/23 130 kg (285 lb 8 oz)       Patient with a hx of [id number]%. · Patient euvolemic after CVVH  · Currently holding home torsemide in the setting of anuria.    · permacath placed for intermittent HD  · D/w nephro, on IV lasix

## 2023-06-26 NOTE — ASSESSMENT & PLAN NOTE
Patient spiked a fever of 101.4  6/24/23. S/p  Rocephin x 7 days for urosepsis earlier in the admission. Currently afebrile  Denies abdominal pain, dyspnea, chest pain, nausea, vomiting. On exam, abdomen non tender, non distended. No obvious cellulitis noted on extremities on abdomen. CXR with mild pulmonary edema, procal and lactic acid negative  Blood culture negative 24 hours   UA negative for WBC or nitrites  Continue to monitor off abx  Had positive COVID test two months ago.  So low utility of repeating as it may be false positive

## 2023-06-26 NOTE — PROGRESS NOTES
NEPHROLOGY PROGRESS NOTE   Josafat Serrato 81 y.o. male MRN: [id number]  Unit/Bed#: [facility] 504-01 Encounter: [id number]      ASSESSMENT/PLAN:  1. Acute kidney injury: Due to septic ATN +/- contrast associated nephropathy and diuretics. · Admitted with creatinine 8.7 and associated hyperkalemia and acidosis  · Initiated on IHD 6/14 due to hyperkalemia. Was then on CRRT until 6/19. Last dialysis treatment 6/23  · UA yesterday: +1 protein, 10-25 granular casts  · Appears to have renal recovery good urine output and creatinine today down to 1.45  · Continue Lasix 40 mg IV daily (started 6/24) as he does examine volume overload and recent chest x-ray showed pulmonary edema  · Combs catheter placed yesterday due to incontinence/concern for skin breakdown per discussion with nurse  · Access: Right IJ PermCath placed 6/19  · Check a.m. BMP and if creatinine improving/stable plan to remove dialysis catheter  2. CKD stage III: Baseline creatinine around 1.3-1.6  3. Septic shock with MDR E coli urosepsis: Improved. Status posttreatment with ceftriaxone and was on pressors. 4. Hypophosphatemia: Improved. Last phosphorus 2.3  5. Anemia: Hemoglobin 8.4. Previous concern for possible GI bleed and status post transfusion  6. Lactic acidosis: Concern for metformin toxicity. Now resolved  7. Diastolic CHF plus lymphedema: On Lasix 40 mg IV daily  · Chest x-ray 6/24: Mild pulmonary edema with small effusions, pneumonia not excluded  8. Dysphagia: Currently n.p.o. and getting tube feeds    Plan Summary:   • Continue Lasix 40 mg IV daily  • Check a.m. BMP  • If creatinine stable/improving we will remove PermCath tomorrow    SUBJECTIVE:  Complains of some shortness of breath earlier this morning but now feeling better. Good urine output.     OBJECTIVE:  Current Weight: Weight - Scale: (!) 142 kg (312 lb 13.3 oz)  Vitals:    06/26/23 0817   BP: 125/63   Pulse: (!) 116   Resp:    Temp:    SpO2: 97%       Intake/Output Summary (Last 24 hours) at 6/26/2023 1124  Last data filed at 6/26/2023 2493  Gross per 24 hour   Intake 620 ml   Output 1250 ml   Net -630 ml     General: Ill-appearing  Skin:  No rash  Eyes:  Sclerae anicteric, no periorbital edema   ENT: NG tube in place  Neck:  Trachea midline, symmetric   Chest: Coarse breath sounds  CVS: Tachycardic  Abdomen:  Soft, nontender, nondistended  Neuro:  Awake and alert  Psych:  Appropriate affect  Extremities: bilateral LE edema   : alfred in place with clear urine output       Medications:  Scheduled Meds:  Current Facility-Administered Medications   Medication Dose Route Frequency Provider Last Rate   • acetaminophen  650 mg Per NG Tube Q6H Madelyn Sera Payne DO     • bisacodyl  10 mg Rectal Daily Emeterio Cifuentes MD     • diphenhydramine, lidocaine, Al/Mg hydroxide, simethicone  10 mL Swish & Spit Q6H PRN Vaughn Bautista MD     • heparin (porcine)  5,000 Units Subcutaneous Q8H [facility] Emeterio Cifuentes MD     • HYDROmorphone  0.5 mg Intravenous Q4H PRN Emeterio Cifuentes MD     • HYDROmorphone  0.2 mg Intravenous Q4H PRN Sandra Trujillo MD     • insulin lispro  2-12 Units Subcutaneous Q6H ODESSA Mercado     • lidocaine  1 patch Topical Daily Vaughn Bautista MD     • lidocaine   Topical 4x Daily PRN Vaughn Bautista MD     • melatonin  6 mg Oral HS ODESSA Mercado     • nystatin   Topical BID Lashae Paredes DO     • pantoprazole  40 mg Intravenous Q24H [facility] Emeterio Cifuentes MD     • trimethobenzamide  200 mg Intramuscular Q6H PRN Vaughn Bautista MD       Facility-Administered Medications Ordered in Other Encounters   Medication Dose Route Frequency Provider Last Rate   • sodium chloride  75 mL/hr Intravenous Continuous Lalit MD Sherita Stopped (05/26/23 5826)       PRN Meds:.•  diphenhydramine, lidocaine, Al/Mg hydroxide, simethicone  •  HYDROmorphone  •  HYDROmorphone  •  lidocaine  •  trimethobenzamide    Laboratory Results:  Results from last 7 days   Lab Units 06/26/23  1680 06/25/23  1220 06/25/23  6221 06/24/23  0525 06/23/23  0435 06/22/23  0355 06/21/23  0517 06/20/23  0455 06/20/23  0455 06/19/23  1446   WBC Thousand/uL 11.19*  --   --  10.36* 10.13 9.44 11.47*  --  10.96*  --    HEMOGLOBIN g/dL 8.4*  --   --  8.1* 7.8* 8.2* 8.2*  --  7.0*  --    HEMATOCRIT % 28.9*  --   --  26.4* 26.7* 26.5* 26.1*  --  22.9*  --    PLATELETS Thousands/uL 184  --   --  136* 110* 92* 69*  --  67* 63*   SODIUM mmol/L 142  --  137 139 140 139 138  --  138  --    POTASSIUM mmol/L 4.3 4.7 5.8* 3.9 4.0 4.0 3.9   < > 3.9  --    CHLORIDE mmol/L 113*  --  111* 108 108 106 106  --  108  --    CO2 mmol/L 28  --  26 28 27 30 27  --  26  --    BUN mg/dL 27*  --  25 19 21 18 14  --  18  --    CREATININE mg/dL 1.45*  --  1.62* 1.63* 1.90* 1.90* 1.61*  --  1.83*  --    CALCIUM mg/dL 7.6*  --  8.2* 8.3 7.9* 8.1* 8.1*  --  8.7  --    MAGNESIUM mg/dL  --   --   --   --   --   --  1.8  --  1.9  --    PHOSPHORUS mg/dL 2.3  --   --   --  2.5  --  2.3  --  2.7  --     < > = values in this interval not displayed.

## 2023-06-26 NOTE — ASSESSMENT & PLAN NOTE
Lab Results   Component Value Date    HGBA1C 5.0 06/15/2023       Recent Labs     06/25/23  1730 06/26/23  0002 06/26/23  0602 06/26/23  1143   POCGLU 162* 156* 164* 161*       Blood Sugar Average: Last 72 hrs:  (P) [id number]   Lab Results   Component Value Date    HGBA1C 5.0 06/15/2023       Recent Labs     06/25/23  1730 06/26/23  0002 06/26/23  0602 06/26/23  1143   POCGLU 162* 156* 164* 161*       Blood Sugar Average: Last 72 hrs:  (P) [id number]     Plan:  · Home regimen of metformin held in the setting of REMA and metabolic acidosis.    · Continue with SSI QID

## 2023-06-26 NOTE — ASSESSMENT & PLAN NOTE
Patient reported acutely worsening low back pain that is very severe, 10/10, low back across, no radiation. No weakness of lower extremity. Xray obtained: Limited study without a true lateral image. Unchanged severe L4 compression deformity. Backspace Progressive loss of height of the L2 vertebral body may represent an acute fracture. Consider CT follow-up. CT L spine scan shows acute L1 fracture and chronic L4 fracture  Pain control with IV pain meds.    Palliative care following for pain control, appreciate recommendations  Neurosx consult appreciated, no surgery at this point  LSO brace

## 2023-06-26 NOTE — ASSESSMENT & PLAN NOTE
Patient with chronic macrocytic anemia baseline 7-8. · Required 2U PRBC this admission  · Goal Hgb >7, replete as necessary  · EGD completed: Single small, superficial ulcer in the 1st part of the duodenum with clean base   · hgb stable 8.1, repeat pending  · Continue PPI  · Watch for s/s of GI bleed  · Resume DVT ppx with heparin and trend hgb.    · Continue to hold full AC with coumadin for possible PEG placement this week

## 2023-06-26 NOTE — ASSESSMENT & PLAN NOTE
Admission platelets of 878. Steady downtrend this admission, saranya at 35. One instance of melena documented on 6/16 but at the time patient had platelete count in the 120k-130k. Unclear eitiology but likely 2/2 acute sepsis vs CVVH and filter clotting. Patient did receive heparin intermittently this admission however platelets have not responded to discontinuation of heparin products. Possibly cephalosporin induced, but patient is s/p 4 days of cefepime and then transitioned to ceftriaxone. · Likely 2/2 sepsis  · No acute signs of bleeding on exam  · S/p 1 U platelets 2/04 in preparation for IR permacath placement  · Trend CBC  · Resumed Heparin SC.  Continue to monitor for platelet  · uptrending platelets noted

## 2023-06-26 NOTE — ASSESSMENT & PLAN NOTE
Patient had VBS with speech   · Unfortunately he is aspiration with both solid and liquid. · change to NPO, will change PO meds to IV and other routes. · Speech recommends PEG tube. · GI recommends trial of NG tube first prior to committing to PEG tube. · Started NG tube feeding 6/22, currently at goal, tolerating. · Repeat VBS 6/26 with worsening aspiration, GI updated.  Will plan to evaluate tomorrow with likely PEG tube placement this week

## 2023-06-26 NOTE — TELEPHONE ENCOUNTER
[facility]20[facility] 06/29/2023-[facility]    06/28/2023-PT STILL IN HOSPITAL    06/27/2023-PT STILL IN HOSPITAL    [facility]  07/10/2023 APT W/LUMBAR SPINE XRAY      06/24/2023-Sneha Haile, ODESSA Mckenna can you please schedule 2 week follow up for this gentleman with AP? I have ordered lumbar x-rays

## 2023-06-26 NOTE — SPEECH THERAPY NOTE
Speech Pathology - Modified Barium Swallow Study    Patient Name: Renea Vera    Today's Date: 6/26/2023     Problem List  Principal Problem:    REMA (acute kidney injury) ([address])  Active Problems:    Diabetes ([address])    Acute on chronic anemia    Atrial fibrillation (HCC)    History of ventricular tachycardia    CHF (congestive heart failure) (HCC)    Prolonged QT interval    Thrombocytopenia (HCC)    Septic shock (HCC)    Dysphagia    Acute low back pain    Fever      Past Medical History  Past Medical History:   Diagnosis Date   • Anemia    • Arthritis    • Atrial fibrillation ([address])    • Basal cell carcinoma 03/22/2022    Tip of Nose   • CHF (congestive heart failure) (HCC)    • Diabetes mellitus (HCC)     Niddm   • DVT (deep vein thrombosis) in pregnancy 1966    not in pregnancy   • DVT (deep venous thrombosis) ([address]) 1966   • Dyslipidemia    • Encephalopathy acute 11/4/2022   • GERD (gastroesophageal reflux disease)    • Hyperlipidemia    • Hypertension    • Hypomagnesemia 10/19/2022   • Irregular heart beat     Afib   • Morbid obesity due to excess calories ([address])     Resolved 9/2/2014    • Pulmonary embolism (HCC)    • Sepsis ([address])    • Squamous cell skin cancer 07/30/2020    Left posterior scalp   • Visual impairment        Past Surgical History  Past Surgical History:   Procedure Laterality Date   • AORTIC VALVE REPLACEMENT     • CARDIAC DEFIBRILLATOR PLACEMENT  04/2014   • CARDIAC SURGERY  02/2014    AVR   • COLONOSCOPY     • INSERT / REPLACE / REMOVE PACEMAKER     • IR ASPIRATION BAKERS CYST  3/8/2023   • IR CHOLECYSTOSTOMY TUBE CHECK/CHANGE/REPOSITION/REINSERTION/UPSIZE  10/13/2022   • IR CHOLECYSTOSTOMY TUBE CHECK/CHANGE/REPOSITION/REINSERTION/UPSIZE  10/19/2022   • IR CHOLECYSTOSTOMY TUBE CHECK/CHANGE/REPOSITION/REINSERTION/UPSIZE  10/27/2022   • IR CHOLECYSTOSTOMY TUBE CHECK/CHANGE/REPOSITION/REINSERTION/UPSIZE  11/7/2022   • IR CHOLECYSTOSTOMY TUBE CHECK/CHANGE/REPOSITION/REINSERTION/UPSIZE  11/10/2022 • IR CHOLECYSTOSTOMY TUBE CHECK/CHANGE/REPOSITION/REINSERTION/UPSIZE  12/1/2022   • IR CHOLECYSTOSTOMY TUBE CHECK/CHANGE/REPOSITION/REINSERTION/UPSIZE  1/6/2023   • IR CHOLECYSTOSTOMY TUBE CHECK/CHANGE/REPOSITION/REINSERTION/UPSIZE  1/24/2023   • IR CHOLECYSTOSTOMY TUBE CHECK/CHANGE/REPOSITION/REINSERTION/UPSIZE  3/15/2023   • IR CHOLECYSTOSTOMY TUBE PLACEMENT  9/1/2022   • IR DRAINAGE TUBE CHECK AND/OR REMOVAL  3/22/2023   • IR DRAINAGE TUBE CHECK AND/OR REMOVAL  4/10/2023   • IR DRAINAGE TUBE PLACEMENT  4/6/2023   • IR TUNNELED DIALYSIS CATHETER PLACEMENT  6/19/2023   • JOINT REPLACEMENT Left     LTKR   • MOHS SURGERY  07/30/2020    Left posterior scalp, Dr. Brittani Cervantes   • MOHS SURGERY  04/18/2022    BCC Tip of Nose- Dr. Brittani Cervantes   • Mary Capes DIVJ&STRIP LONG SAPH SAPHFEM Brian Yeison Right 8/17/2018    Procedure: LEG PERFORATED INJECTION SCLEROTHERAPY;  Surgeon: Azalea Franks MD;  Location: [facility];  Service: Vascular   • REPLACEMENT TOTAL KNEE Right    • TOTAL KNEE ARTHROPLASTY Left    • VASCULAR SURGERY     • VENA CAVA FILTER PLACEMENT      Interruption inferior vena cava, Josue filter, placement   • WISDOM TOOTH EXTRACTION         Assessment Summary:    Pt presents with severe oropharyngeal dysphagia characterized by decreased labial seal, repetitive and disorganized tongue movement needed to propel bolus with minimal to no clearance of material and majority of bolus remaining on the tongue. Swallow initiation is delayed to the valleculae. Pharyngeal residue observed in the valleculae and pyriforms and on the pharyngeal wall and BOT. Epiglottic inversion is absent. Anterior displacement and laryngeal elevation are reduced. Laryngeal vestibule closure is absent. PES opening is reduced. Pt presents with multiple attempts to swallow, but only minimal effective swallows. Pt is unable to clear puree. Pt is provided NTL via tsp x2 to help clear material. Oli Million aspiration event is then observed.  Pt presents with minimal throat clearing but no cough following event. Suction is then used to clear residual material following silent aspiration. Scan of the esophagus not able to be completed today. Note: Images are available for review in PACS as desired. Recommendations:   Recommended Diet: NPO with tube feeds   Recommended Form of Medications: non-oral if possible   SLP Dysphagia therapy recommended: TBD pending goals of care    Results Reviewed with: patient, RN and MD     Patient Stated Goal: none    Dysphagia Goals per SLP: pt will participate in repeat swallow eval to determine safety of po intake and/or further instrumental testing. General Information;  6/14/23: Manju Shearer is a 81 y.o. male who presents to ED reporting 2 days of generalized weakness, fatigue, loss of appetite, and feeling generally unwell. Denies chest pain, dyspnea, isolated neuro deficits, or recent change in medications. Workup in ED significant for UTI, AGMA, hypoglycemia, lactic acidosis, and potassium > 7. Assessed by nephrology in ED. On arrival to ICU patient was alert and oriented but becoming increasingly dysarthric. Patient became bradycardic with widening QRS complexes on telemetry. Patient was treated w/ temporizing measures for hyperkalemia and put on HD. Current concerns for dysphagia include coughing with PO and recent aspiration events. MBS was recommended to assess oropharyngeal stage swallowing skills at this time. Prior MBS: 6/21/23:  Pt presents with a moderate oral and a severe pharyngeal dysphagia. Dysphagia characterized by decreased oral control of bolus with prolonged transfer time with all. Oral control is reduced, with spillage over BOT to the valleculae with all consistencies. Epiglottic inversion is incomplete and pharyngeal constriction is reduced.  The patient presents with a mod-significant amount of valleculae retention and intermittently throat clears and regurgitates material into oral cavity. Laryngeal vestibule closure is reduced and the patient is observed with aspiration events across all consistencies. The patient does exhibit a strong cough response. BOT retraction is reduced. Brief scan of the esophagus reveals stasis and possible reflux. Oral Mechanism Exam  Not formally assessed  Facial: symmetrical  Labial: WFL  Lingual: WFL  Mandible: adequate ROM  Dentition: adequate  Vocal quality: weak   Respiratory Status: on 2L O2     Pt was viewed sitting upright in the lateral and AP positions. Due to concerns for patient safety / patient refusal, trials provided deviated from the MBSImP Validated Protocol. Pt was given 5-mL nectar thick and 5-mL pudding. Initial view observations/comments: NGT in place      8-Point Penetration-Aspiration Scale   Thin liquid N/A   Nectar thick liquid 8 - Material enters the airway, passes below the vocal folds, and no effort is made to eject     Honey thick liquid N/A   Puree (pudding) 8 - Material enters the airway, passes below the vocal folds, and no effort is made to eject     Solid N/A     MBS IMP Rating    ORAL Impairment  Compinent 1--Lip Closure  Judged at any point during the swallow. 2 - Escape from interlabial space or lateral juncture; no extension beyond vermilion border    Component 2--Tongue Control During Bolus Hold  Judged on held liquid boluses only and prior to productive tongue movement. N/A    Component 3--Bolus Preparation/Mastication  Judged only during presentation of 1/2 shortbread cookie coated in pudding. N/A    Component 4--Bolus Transport/Lingual Motion  Judged after first productive tongue movement for oral bolus transport. 3 - Repetitive/disorganized tongue motion    Component 5--Oral Residue  Judged after first swallow or after the last swallow of the sequential swallow task.   4 - Minimal to no clearance   Location   C - Tongue    Component 6--Initiation of Pharyngeal Swallow  Judged at first movement of the brisk superior-anterior hyoid trajectory. 1 - Bolus Head in valleculae      PHARYNGEAL Impairment  Component 7--Soft Palate Elevation  Judged during maximum displacement of soft palate. 1 - Trace column of contrast or air between the SP and PW    Component 8--Laryngeal Elevation  Judged when epiglottis is in its most horizontal position. 2 - Minimal superior movement of thyroid cartilage with minimal approximation of arytenoids to epiglottic petiole    Component 9--Anterior Hyoid Excursion  Judged at height of swallow/maximal anterior hyoid displacement. 1 - Partial anterior movement    Component 10--Epiglottic Movement  Judged at height of swallow/maximal anterior hyoid displacement. 2 - No inversion    Component 11--Laryngeal Vestibular Closure  Judged at height of swallow/maximal anterior hyoid displacement. 2 - None; wide column air/contrast in laryngeal vestibule    Component 12--Pharyngeal Stripping Wave  Judged during the full duration of the pharyngeal swallow. 1 - Present - diminished    Component 13--Pharyngeal Contraction  Judged in AP view at rest and throughout maximum movement of structures. N/A    Component 14--Pharyngoesophageal Segment Opening  Judged during maximum distension of PES and throughout opening and closure. 2 - Minimal distension/minimal duration; marked obstruction of flow    Component 15--Tongue Base (TB) Retraction  Judged during maximum retraction of the tongue base. 2 - Narrow column of contrast or air between TB and PW    Component 16--Pharyngeal Residue  Judged after first swallow or after the last swallow of the sequential swallow task.   N/A      ESOPHAGEAL Impairment  Component 17--Esophageal Clearance Upright Position  Judged in AP view during bolus transit through the oral cavity to the LES  N/A

## 2023-06-26 NOTE — ASSESSMENT & PLAN NOTE
Wt Readings from Last 3 Encounters:   06/26/23 (!) 142 kg (312 lb 13.3 oz)   06/07/23 136 kg (299 lb 13.2 oz)   06/02/23 130 kg (285 lb 8 oz)       Patient with a hx of [id number]%. · Patient euvolemic after CVVH  · Currently holding home torsemide in the setting of anuria.    · permacath placed for intermittent HD  · D/w nephro, on IV lasix

## 2023-06-26 NOTE — ASSESSMENT & PLAN NOTE
Patient downgraded from critical care, admitted for septic shock secondary to UTI leading to REMA requiring dialysis. Cr improving  · Likely UTI was the initial cause of REMA, exacerbated by concurrent metformin and sotolol use.    · Combs currently in place as pt noted to have urinary incontinence w/ skin breakdown, consider d/c tomorrow   · monitoring bladder with q6h bladder scans  · CVVH discontinued 6/19  · tunneled dialysis catheter placed on 6/19, on intermittent hemodialysis  · Nephrology following, continue with IV lasix 40 mg daily   · Nephrology to re-evaluate tomorrow, if cr continues to improve likely remove dialysis cath

## 2023-06-26 NOTE — PROGRESS NOTES
Progress note - Palliative and Supportive Care   Aba Elder 81 y.o. male [id number]    Patient Active Problem List   Diagnosis   • Obesity, unspecified obesity severity, unspecified obesity type   • Diabetes ([address])   • Hydrocele   • Chronic anticoagulation   • Acute on chronic anemia   • Atrial fibrillation (HCC)   • History of AVR   • PVD (peripheral vascular disease) ([facility])   • Thrombophlebitis   • History of ventricular tachycardia   • Edema   • Acute on chronic diastolic CHF   • Venous ulcer of right ankle   • Peripheral venous insufficiency   • Ventricular tachycardia   • Secondary lymphedema   • CKD (chronic kidney disease) stage 3, GFR 30-59 ml/min ([facility])   • Morbid obesity   • Benign prostatic hyperplasia with lower urinary tract symptoms   • Acute cholecystitis   • REMA (acute kidney injury) ([address])   • Cardiomegaly   • Chronic bilateral low back pain without sciatica   • 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   • Dyslipidemia   • Endocarditis   • Hyperlipidemia   • Osteoarthritis, knee   • Renal cyst   • Status post right knee replacement   • Swelling of limb   • BPH (benign prostatic hyperplasia)   • History of venous thromboembolism   • Acute on chronic cholecystitis   • Cholecystostomy care [facility])   • Gout   • Calculus of gallbladder   • Extrahepatic biloma   • COVID-19   • Pleural effusion on right   • Melena   • Pulmonary embolism (HCC)   • CHF (congestive heart failure) (HCC)   • Fall   • Acidosis   • Hyponatremia   • Hypophosphatemia   • Prolonged QT interval   • Thrombocytopenia (HCC)   • Septic shock (HCC)   • Dysphagia   • Acute low back pain   • Fever     Active issues specifically addressed today include:   Dysphagia, NG tube in place, VVS on Monday  Severe sepsis secondary to UTI  Acute on chronic low back pain  Heart failure preserved EF, EF 70%  REMA, currently requiring dialysis  Palliative care patient  Frailty in an elderly adult  Goals of care    Plan:  1.  Symptom management -   #Pain   -due to patient's ongoing pain from his acute fracture and difficulty with achieving any rest due to his ongoing pain,   Pain regimen adjusted with Hold parameters per orders:   - IV dilaudid 0.2mg q4 hours PRN for Moderate pain   - IV dilaudid 0.5mg q4 hours PRN for Severe Pain   - IV Dilaudid 1mg q4 hours PRN for breakthrough pain   Hold Parameters:   1. Pt may refuse   2. Hold for sedation/lethargy   3. Hold RR <10    4. Hold for SBP< 90   -Will recommend close monitoring for his vitals and especially in the setting of his infection and soft blood pressure reading this afternoon.   -Due to patient's ongoing pain, however, being vigilant and careful with not oversedating patient, IV Narcan has been ordered as a precautions. Multi-modal analgesia:   -Acetaminophen 650 mg q6 hours ATC   -Transdermal: Lidocaine 5% TD q12 hours ATC    # Gastrointestinal    -Bowel regimen to avoid OIC    - Dulcolax 10mg daily    # Psychosocial    -Support provided    2) Goals of Care / Advanced Care Planning     Code Status: Full Code - Level 1     Decisional apparatus:  Patient competency questionable on my exam today due to his level of fatigue. If competence is lost, patient's substitute decision maker would default to wife [place]. Advance Directive / Living Will / POLST:  None on file    [id number] [facility] DO Matteo  Palliative and Supportive Care  Clinic/Answering Service: [phone]  You can find me on [employer]. Chief Complaint  Chief Complaint   Patient presents with   • Abdominal Pain     Pt reports generalized abdominal pain, weakness x2 days, today pt started with nausea; denies fevers       Subjective:  Patient seen and examined this afternoon. Wife and daughter at bedside with concerns of patient's continued ongoing pain. Patient was sleepy on exam as he did not sleep well last night and was not able to get any restful sleep throughout the day.   Family states he has been dealing with this pain and are open to trying to better control this as he is awakened by his pain. Denies any issues with bowel movements. Review of Systems   Constitutional:        Review of systems was limited as patient was trying to sleep. Musculoskeletal: Positive for back pain. MEDICATIONS / ALLERGIES:     all current active meds have been reviewed, current meds:   Current Facility-Administered Medications   Medication Dose Route Frequency   • acetaminophen (TYLENOL) oral suspension 650 mg  650 mg Per NG Tube Q6H   • bisacodyl (DULCOLAX) rectal suppository 10 mg  10 mg Rectal Daily   • diphenhydramine, lidocaine, Al/Mg hydroxide, simethicone (Magic Mouthwash) oral solution 10 mL  10 mL Swish & Spit Q6H PRN   • [START ON 6/27/2023] furosemide (LASIX) injection 40 mg  40 mg Intravenous Daily   • heparin (porcine) subcutaneous injection 5,000 Units  5,000 Units Subcutaneous Q8H [facility]   • HYDROmorphone HCl (DILAUDID) injection 0.2 mg  0.2 mg Intravenous Q4H PRN    Or   • HYDROmorphone (DILAUDID) injection 0.5 mg  0.5 mg Intravenous Q4H PRN   • HYDROmorphone (DILAUDID) injection 1 mg  1 mg Intravenous Q4H PRN   • insulin lispro (HumaLOG) 100 units/mL subcutaneous injection 2-12 Units  2-12 Units Subcutaneous Q6H   • lidocaine (LIDODERM) 5 % patch 1 patch  1 patch Topical Daily   • lidocaine (LMX) 4 % cream   Topical 4x Daily PRN   • melatonin tablet 6 mg  6 mg Oral HS   • naloxone (NARCAN) 0.04 mg/mL syringe 0.04 mg  0.04 mg Intravenous Q1MIN PRN   • nystatin (MYCOSTATIN) powder   Topical BID   • pantoprazole (PROTONIX) injection 40 mg  40 mg Intravenous Q24H AMANDA   • trimethobenzamide (TIGAN) IM injection 200 mg  200 mg Intramuscular Q6H PRN     Facility-Administered Medications Ordered in Other Encounters   Medication Dose Route Frequency   • sodium chloride 0.9 % infusion  75 mL/hr Intravenous Continuous    and PTA meds:   Prior to Admission Medications   Prescriptions Last Dose Informant Patient Reported? Taking? Acetaminophen (TYLENOL EXTRA STRENGTH PO)  Self Yes No   Sig: Take 500 mg by mouth if needed (for pain as needed). Indications: pain as needed   B Complex-C (SUPER B COMPLEX PO)  Self Yes No   Sig: Take 1 capsule by mouth daily    Cholecalciferol 25 MCG (1000 UT) capsule  Self Yes No   Sig: Take 3,000 Units by mouth daily. Indications: Vitamin D Deficiency   Diclofenac Sodium (VOLTAREN) 1 %  Self No No   Sig: Apply 2 g topically 4 (four) times a day   Iron Combinations (NIFEREX) TABS  Self Yes No   Sig: Take 1 tablet by mouth in the morning   Multiple Vitamin (MULTIVITAMINS PO)  Self Yes No   Sig: Take 1 tablet by mouth daily   Potassium Chloride ER 20 MEQ TBCR  Self Yes No   Sig: Take 20 mEq by mouth in the morning. Indications: Low Amount of Potassium in the Blood   atorvastatin (LIPITOR) 40 mg tablet  Self Yes No   Sig: Take 40 mg by mouth daily after dinner Atorvastatin Calcium 40 MG Oral Tablet Take 1 tablet daily  Refills: 0  Active    fluticasone (FLONASE) 50 mcg/act nasal spray  Self Yes No   Si sprays into each nostril daily as needed Shake liquid and spray   gabapentin (NEURONTIN) 100 mg capsule  Self Yes No   Sig: Take 100 mg by mouth daily   lubiprostone (AMITIZA) 24 mcg capsule  Self Yes No   Sig: Take 24 mcg by mouth 2 (two) times a day with meals. Indications: Chronic Constipation of Unknown Cause   metFORMIN (GLUCOPHAGE) 1000 MG tablet  Self Yes No   Sig: Take 1,000 mg by mouth daily with dinner.  Indications: Type 2 Diabetes   omeprazole (PriLOSEC) 20 mg delayed release capsule  Self Yes No   Sig: Omeprazole 20 MG Oral Tablet Delayed Release Take 1 tablet daily  Refills: 0  Active   polyethylene glycol (GLYCOLAX) 17 GM/SCOOP powder  Self Yes No   Sig: Take 17 g by mouth 2 (two) times a day   senna-docusate sodium (SENOKOT S) 8.6-50 mg per tablet  Self No No   Sig: Take 1 tablet by mouth daily at bedtime   sodium hypochlorite (DAKIN'S HALF-STRENGTH) external solution   No No   Sig: Apply 1 application. topically daily At each dressing change   sotalol (BETAPACE) 80 mg tablet   No No   Sig: Take 1 tablet (80 mg total) by mouth daily   torsemide 40 MG TABS   No No   Sig: Take 40 mg by mouth 2 (two) times a day   warfarin (COUMADIN) 2 mg tablet  Self No No   Sig: Take 2 tablets (4 mg total) by mouth daily Acknowledge   Patient taking differently: Take 4 mg by mouth daily. 6.7.23   Coumadin 4 mg daily. PT.INR  6.13.23. Indications: Atrial Fibrillation      Facility-Administered Medications Last Administration Doses Remaining   lidocaine (LMX) 4 % cream 6/13/2023  3:05 PM 0          Allergies   Allergen Reactions   • Tramadol Other (See Comments)     intolerance       OBJECTIVE:    Physical Exam  Physical Exam  Constitutional:       Appearance: He is ill-appearing. He is not toxic-appearing or diaphoretic. Comments: Elderly gentleman who looks chronically ill. Patient was with some brow for rowing as well as some discomfort especially with any shifting of his body. He did look fatigued and during my conversation with him and family he did doze off. Patient with occasionally open his eyes due to his discomfort. Otherwise, ROS was limited. HENT:      Head: Normocephalic. Cardiovascular:      Rate and Rhythm: Tachycardia present. Pulmonary:      Effort: Pulmonary effort is normal. No respiratory distress. Abdominal:      General: There is no distension. Comments: Round abdomen. Skin:     General: Skin is warm and dry. Coloration: Skin is pale. Skin is not cyanotic or jaundiced. Neurological:      Comments: Very fatigued during exam.  Patient did show signs of discomfort with his bowel furring and facial grimace. Lab Results:   I have personally reviewed pertinent labs. , CBC:   Lab Results   Component Value Date    WBC 11.19 (H) 06/26/2023    HGB 8.4 (L) 06/26/2023    HCT 28.9 (L) 06/26/2023     (H) 06/26/2023     06/26/2023    RBC 2.74 (L) 06/26/2023 MCH 30.7 06/26/2023    MCHC 29.1 (L) 06/26/2023    RDW 21.1 (H) 06/26/2023    MPV 11.3 06/26/2023   , CMP:   Lab Results   Component Value Date    SODIUM 142 06/26/2023    K 4.3 06/26/2023     (H) 06/26/2023    CO2 28 06/26/2023    BUN 27 (H) 06/26/2023    CREATININE 1.45 (H) 06/26/2023    CALCIUM 7.6 (L) 06/26/2023    EGFR 42 06/26/2023   , BMP:  Lab Results   Component Value Date    SODIUM 142 06/26/2023    K 4.3 06/26/2023     (H) 06/26/2023    CO2 28 06/26/2023    BUN 27 (H) 06/26/2023    CREATININE 1.45 (H) 06/26/2023    GLUC 137 06/26/2023    CALCIUM 7.6 (L) 06/26/2023    AGAP 1 06/26/2023    EGFR 42 06/26/2023   , PT/PTT:No results found for: "PT", "PTT"  Imaging Studies: Reviewed  EKG, Pathology, and Other Studies: Reviewed    Counseling / Coordination of Care  Total floor / unit time spent today 15 minutes. Greater than 50% of total time was spent with the patient and / or family counseling and / or coordination of care. A description of the counseling / coordination of care: symptom management    This note was shared with the patient. Alexandr Tirado DO  Hospice and Palliative Care Fellow  Palliative & Supportive Care  Clinic/Answering Service: [phone]  You can find me on [employer]. Portions of this document may have been created using dictation software and as such some "sound alike" terms may have been generated by the system. Do not hesitate to contact me with any questions or clarifications.

## 2023-06-27 NOTE — OCCUPATIONAL THERAPY NOTE
Occupational Therapy Progress Note     Patient Name: Cherelle CORONELGUB'B Date: 6/27/2023  Problem List  Principal Problem:    REMA (acute kidney injury) ([address])  Active Problems:    Diabetes ([address])    Acute on chronic anemia    Atrial fibrillation (HCC)    History of ventricular tachycardia    CHF (congestive heart failure) (HCC)    Prolonged QT interval    Thrombocytopenia (HCC)    Septic shock (HCC)    Dysphagia    Acute low back pain    Fever          06/27/23 1201   OT Last Visit   OT Visit Date 06/27/23   Note Type   Note Type Treatment   Pain Assessment   Pain Assessment Tool 0-10   Pain Score No Pain   Restrictions/Precautions   Weight Bearing Precautions Per Order No   Braces or Orthoses (S)  LSO  (when OOB or HOB >45 degrees)   Other Precautions Fall Risk;Pain;Multiple lines;Telemetry;O2; Chair Alarm; Bed Alarm   ADL   Where Assessed Edge of bed   LB Dressing Assistance 1  Total Assistance   LB Dressing Deficit Don/doff L sock; Don/doff R sock   Bed Mobility   Supine to Sit 2  Maximal assistance   Additional items Assist x 2; Increased time required;LE management;Verbal cues   Additional Comments max Ax2, 3rd nearby. sat EOB w min A/S   Transfers   Sit to Stand 2  Maximal assistance   Additional items Assist x 2; Increased time required;Verbal cues   Stand to Sit 2  Maximal assistance   Additional items Assist x 2; Increased time required;Verbal cues   Sit pivot 2  Maximal assistance   Additional items Assist x 2; Increased time required;Verbal cues   Additional Comments b/l HHA And knee blocking, vc for hand placement and inc overall safety awareness. Functional Mobility   Additional Comments attempted FM, however pt unable 2' fatigue and weak LE's   Subjective   Subjective "I am doing ok"   Cognition   Overall Cognitive Status Impaired   Arousal/Participation Alert; Responsive; Cooperative   Attention Within functional limits   Orientation Level Oriented to person;Oriented to place;Oriented to time;Disoriented to situation   Memory Decreased recall of precautions   Following Commands Follows one step commands with increased time or repetition   Comments w improvements in cognition, however cont to present with overall dec processing speeds, safety awareness and insight to condition t/o session. Activity Tolerance   Activity Tolerance Patient limited by fatigue   Medical Staff Made [facility]  2' pts med complexity, comorbidities and regression from baseline. Assessment   Assessment Pt seen on this date for OT session focusing on ADL retraining, cognitive reorientation, body mechanics, transfer retraining, increasing activity tolerance/endurance and EOB sitting to increase ability to participate in ADL/functional tasks. Pt was found in bed and was left in chair w/ all needs within reach. Pt completed bed mob w max Ax2, STS trial 2x w max Ax2, stand pivot with max Ax2- 3rd assist close-by t/o all transfers and fm 2' inc fatigue. LB w total A. Pt w/ improvements in activity tolerance, adl task completion, however is still limited 2* decreased ADL/High-level ADL status, decreased activity tolerance/endurance, decreased cognition, decreased self-care trans, decreased safety awareness and insight to condition. The patient's raw score on the AM-PAC Daily Activity Inpatient Short Form is 14. A raw score of less than 19 suggests the patient may benefit from discharge to post-acute rehabilitation services. Please refer to the recommendation of the Occupational Therapist for safe discharge planning. Recommending pt D/C to STR when medically stable. Pt will continue to benefit from acute OT services to meet goals. Plan   Treatment Interventions ADL retraining;Functional transfer training; Endurance training;Patient/family training;Equipment evaluation/education; Compensatory technique education; Energy conservation; Activityengagement   Goal Expiration Date 07/03/23   OT Treatment Day 1   OT Frequency 2-3x/wk   Recommendation OT Discharge Recommendation Post acute rehabilitation services   AM-PAC Daily Activity Inpatient   Lower Body Dressing 2   Bathing 2   Toileting 2   Upper Body Dressing 2   Grooming 3   Eating 3   Daily Activity Raw Score 14   Daily Activity Standardized Score (Calc for Raw Score >=11) 33.39   AM-PAC Applied Cognition Inpatient   Following a Speech/Presentation 3   Understanding Ordinary Conversation 4   Taking Medications 4   Remembering Where Things Are Placed or Put Away 4   Remembering List of 4-5 Errands 4   Taking Care of Complicated Tasks 3   Applied Cognition Raw Score 22   Applied Cognition Standardized Score 47.83   Modified Karan Scale   Modified [place] Scale 4   End of Consult   Education Provided Yes   Patient Position at End of Consult Bedside chair; All needs within reach   Nurse Communication Nurse aware of consult         BILLY Holt, OTR/L

## 2023-06-27 NOTE — PLAN OF CARE
Problem: PHYSICAL THERAPY ADULT  Goal: Performs mobility at highest level of function for planned discharge setting. See evaluation for individualized goals. Description: Treatment/Interventions: OT, Spoke to nursing, Gait training, Bed mobility, Patient/family training, Endurance training, LE strengthening/ROM, Functional transfer training          See flowsheet documentation for full assessment, interventions and recommendations. Outcome: Progressing  Note: Prognosis: Good  Problem List: Decreased strength, Decreased range of motion, Decreased endurance, Impaired balance, Decreased mobility, Decreased coordination, Decreased cognition, Decreased safety awareness, Impaired judgement, Pain  Assessment: Patient received in bed. Patient agreeable to therapy. Patient performs bed mobility with Max Ax2. Patient sits EOB with fair balance, supervision. Patient performs functional transfers with Max Ax2, STS x2. Patient performs stand pivot transfer with Max Ax2. Sit pivot utilized for positioning in chair with Max Ax2. Patient with difficulty lifting BLE for stepping, and thus ambulation not appropriate at this time. Patient left in bedside chair with all needs met and call bell/personal items within reach. The patient's AM-PAC Basic Mobility Inpatient Short Form Raw Score is 6 showing further need for skilled PT services in order to improve functional mobility, decrease need for assistance, and return to PLOF. A Raw score of less than 16 suggests the patient may benefit from discharge to post-acute rehabilitation services. PT continues to recommend rehab on d/c. Will continue to follow as able. PT Discharge Recommendation: Post acute rehabilitation services    See flowsheet documentation for full assessment.

## 2023-06-27 NOTE — ASSESSMENT & PLAN NOTE
Patient with chronic macrocytic anemia baseline 7-8. · Required 2U PRBC this admission  · EGD completed: Single small, superficial ulcer in the 1st part of the duodenum with clean base   · Continue PPI  · Watch for s/s of GI bleed  · Resume DVT ppx with heparin and trend hgb.    · Continue to hold full AC with coumadin for possible PEG placement this week  · Check anemia panel

## 2023-06-27 NOTE — PROGRESS NOTES
[address] [place] [address]  Progress Note  Name: Brendan Salgado  MRN: [id number]  Unit/Bed#: [facility] 382-92 I Date of Admission: 6/14/2023   Date of Service: 6/27/2023 I Hospital Day: 13    Assessment/Plan   Septic shock [facility])  Assessment & Plan  2/2 MDR E.coli urosepsis. Briefly required ICU stay and pressor support. Currently off pressors. · UCx shows Ecoli and klebsiella both susceptible to ceftriaxone  · Completed ceftriaxone for a total abx duration of 7 days. * REMA (acute kidney injury) [facility])  Assessment & Plan  Patient downgraded from critical care, admitted for septic shock secondary to UTI leading to REMA requiring dialysis. Cr improving  · Likely UTI was the initial cause of REMA, exacerbated by concurrent metformin a  · Combs currently in place as pt noted to have urinary incontinence w/ skin breakdown,  · CVVH discontinued 6/19  · tunneled dialysis catheter placed on 6/19, on intermittent hemodialysis with a plan to remove catheter tomorrow by IR  · Nephrology following, continue with IV lasix 40 mg daily   · Renal function improving    Acute on chronic anemia  Assessment & Plan  Patient with chronic macrocytic anemia baseline 7-8. · Required 2U PRBC this admission  · EGD completed: Single small, superficial ulcer in the 1st part of the duodenum with clean base   · Continue PPI  · Watch for s/s of GI bleed  · Resume DVT ppx with heparin and trend hgb. · Continue to hold full AC with coumadin for possible PEG placement this week  · Check anemia panel    Fever  Assessment & Plan  Patient spiked a fever of 101.4  6/24/23. S/p  Rocephin x 7 days for urosepsis earlier in the admission. Currently afebrile  Denies abdominal pain, dyspnea, chest pain, nausea, vomiting.    CXR with mild pulmonary edema, procal and lactic acid negative  Negative blood cultures  UA negative for WBC or nitrites  Patient was started on ceftriaxone on 6/26 for possible aspiration  Continue to monitor      Acute low back pain  Assessment & Plan  Patient reported acutely worsening low back pain that is very severe, 10/10, low back across, no radiation. No weakness of lower extremity. Xray obtained: Limited study without a true lateral image. Unchanged severe L4 compression deformity. Backspace Progressive loss of height of the L2 vertebral body may represent an acute fracture. Consider CT follow-up. CT L spine scan shows acute L1 fracture and chronic L4 fracture  Pain control with IV pain meds. Palliative care following for pain control, appreciate recommendations  Neurosx consult appreciated, no surgery at this point  LSO brace    Dysphagia  Assessment & Plan  Patient had VBS with speech   · Unfortunately he is aspiration with both solid and liquid. · Speech recommends PEG tube. · GI recommends trial of NG tube first prior to committing to PEG tube. · Started NG tube feeding 6/22, currently at goal, tolerating. · Repeat VBS 6/26 with worsening aspiration, GI updated. Will plan to evaluate today  with likely PEG tube placement this week    CHF (congestive heart failure) [facility])  Assessment & Plan  Wt Readings from Last 3 Encounters:   06/27/23 (!) 141 kg (311 lb 15.2 oz)   06/07/23 136 kg (299 lb 13.2 oz)   06/02/23 130 kg (285 lb 8 oz)       Patient with a hx of HFpEF70%. Currently on IV Lasix per nephrology  Monitor volume status    History of ventricular tachycardia  Assessment & Plan  S/p DUAL CHAMBER ICD/ NOT MRI CONDITIONAL .     Atrial fibrillation [facility])  Assessment & Plan  Home regimen of Sotalol and warfarin, both held on admission  · Continue to hold Coumadin  · Restart sotalol today        Diabetes [facility])  Assessment & Plan  Lab Results   Component Value Date    HGBA1C 5.0 06/15/2023       Recent Labs     06/26/23  1800 06/27/23  0011 06/27/23  0612 06/27/23  1105   POCGLU 202* 142* 201* 161*       Blood Sugar Average: Last 72 hrs:  (P) 175.2   Lab Results   Component Value Date HGBA1C 5.0 06/15/2023       Recent Labs     23  1800 23  0011 23  0612 23  1105   POCGLU 202* 142* 201* 161*       Blood Sugar Average: Last 72 hrs:  (P) 175.2     Plan:  · Home regimen of metformin held in the setting of REMA and metabolic acidosis. · Continue with SSI QID   · Add Lantus nightly             VTE Pharmacologic Prophylaxis:   High Risk (Score >/= 5) - Pharmacological DVT Prophylaxis Ordered: heparin. Sequential Compression Devices Ordered. Patient Centered Rounds: I performed bedside rounds with nursing staff today. Discussions with Specialists or Other Care Team Provider: Palliative care, GI    Education and Discussions with Family / Patient: Updated  (wife) at bedside. Total Time Spent on Date of Encounter in care of patient: 55 minutes This time was spent on one or more of the following: performing physical exam; counseling and coordination of care; obtaining or reviewing history; documenting in the medical record; reviewing/ordering tests, medications or procedures; communicating with other healthcare professionals and discussing with patient's family/caregivers. Current Length of Stay: 13 day(s)  Current Patient Status: Inpatient   Certification Statement: The patient will continue to require additional inpatient hospital stay due to Management of renal failure and dysphagia  Discharge Plan: Anticipate discharge in >72 hrs to rehab facility. Code Status: Level 1 - Full Code    Subjective:   Patient seen and examined  Comfortable in bed  Intermittent shortness of breath  Wife at bedside    Objective:     Vitals:   Temp (24hrs), Av.7 °F (37.1 °C), Min:98 °F (36.7 °C), Max:100.2 °F (37.9 °C)    Temp:  [98 °F (36.7 °C)-100.2 °F (37.9 °C)] 98 °F (36.7 °C)  HR:  [105-117] 111  Resp:  [16-20] 20  BP: ()/(46-65) 115/65  SpO2:  [93 %-97 %] 93 %  Body mass index is 41.16 kg/m². Input and Output Summary (last 24 hours):      Intake/Output Summary (Last 24 hours) at 6/27/2023 1341  Last data filed at 6/27/2023 1155  Gross per 24 hour   Intake 881 ml   Output 1300 ml   Net -419 ml       Physical Exam:   Physical Exam   Patient is awake alert, no acute distress  Chronically ill-appearing, with generalized weakness and deconditioning  Obese  NG tube in place  Lungs with decreased breath sound bilateral  Positive S1-S2 no murmur  Abdomen soft obese nontender  Combs catheter in place  Bilateral lower extremity venous stasis edema    Additional Data:     Labs:  Results from last 7 days   Lab Units 06/27/23  0522   WBC Thousand/uL 10.08   HEMOGLOBIN g/dL 7.6*   HEMATOCRIT % 26.0*   PLATELETS Thousands/uL 205     Results from last 7 days   Lab Units 06/27/23  0522 06/26/23  0445   SODIUM mmol/L 143 142   POTASSIUM mmol/L 4.5 4.3   CHLORIDE mmol/L 110* 113*   CO2 mmol/L 30 28   BUN mg/dL 34* 27*   CREATININE mg/dL 1.56* 1.45*   ANION GAP mmol/L 3 1   CALCIUM mg/dL 8.4 7.6*   ALBUMIN g/dL  --  1.8*   GLUCOSE RANDOM mg/dL 151* 137     Results from last 7 days   Lab Units 06/22/23  0355   INR  1.74*     Results from last 7 days   Lab Units 06/27/23  1105 06/27/23  0612 06/27/23  0011 06/26/23  1800 06/26/23  1143 06/26/23  0602 06/26/23  0002 06/25/23  1730 06/25/23  1151 06/25/23  0646 06/25/23  0505 06/24/23  2355   POC GLUCOSE mg/dl 161* 201* 142* 202* 161* 164* 156* 162* 179* 187* 162* 175*         Results from last 7 days   Lab Units 06/26/23  2016 06/25/23  0555 06/24/23  1404   LACTIC ACID mmol/L 1.5  --  1.9   PROCALCITONIN ng/ml  --  0.20 0.19       Lines/Drains:  Invasive Devices     Peripheral Intravenous Line  Duration           Peripheral IV 06/26/23 Dorsal (posterior); Left Forearm 1 day          Hemodialysis Catheter  Duration           HD Permanent Double Catheter 7 days          Drain  Duration           NG/OG Tube Nasogastric Left nare 4 days    Urethral Catheter Non-latex 16 Fr. 2 days              Urinary Catheter:  Goal for removal: Voiding trial when ambulation improves               Imaging: No pertinent imaging reviewed. Recent Cultures (last 7 days):   Results from last 7 days   Lab Units 06/24/23  1405   BLOOD CULTURE  No Growth at 48 hrs. No Growth at 48 hrs.        Last 24 Hours Medication List:   Current Facility-Administered Medications   Medication Dose Route Frequency Provider Last Rate   • acetaminophen  650 mg Per NG Tube Q6H Madelyn Sera Payne DO     • bisacodyl  10 mg Rectal Daily Kaleb Lainez MD     • cefTRIAXone  1,000 mg Intravenous Q24H Lashaemark Paredes DO 1,000 mg (06/26/23 2056)   • diphenhydramine, lidocaine, Al/Mg hydroxide, simethicone  10 mL Swish & Spit Q6H PRN Adeola Bautista MD     • furosemide  40 mg Intravenous Daily Luis Felipe Siddiqi MD     • gabapentin  300 mg Oral HS ODESSA Sanchez     • heparin (porcine)  5,000 Units Subcutaneous Q8H [facility] Kaleb Lainez MD     • HYDROmorphone  0.5 mg Intravenous Q4H PRN ODESSA Sanchez     • insulin glargine  10 Units Subcutaneous HS Keerthi Esteves DO     • insulin lispro  2-12 Units Subcutaneous Q6H ODESSA Lindsay     • lidocaine  1 patch Topical Daily Adeola Bautista MD     • lidocaine   Topical 4x Daily PRN Adeola Bautista MD     • melatonin  6 mg Oral HS ODESSA Lindsay     • naloxone  0.04 mg Intravenous Q1MIN PRN Kaden Felipe DO     • nystatin   Topical BID Lashae Paredes DO     • oxyCODONE  5 mg Per NG Tube Q4H PRN ODESSA Sanchez      Or   • oxyCODONE  7.5 mg Oral Q4H PRN ODESSA Sanchez     • pantoprazole  40 mg Intravenous Q24H Jany Rodriguez MD     • sotalol  80 mg Oral Daily Keertih Esteves DO     • trimethobenzamide  200 mg Intramuscular Q6H PRN Adeola Bautista MD       Facility-Administered Medications Ordered in Other Encounters   Medication Dose Route Frequency Provider Last Rate   • sodium chloride  75 mL/hr Intravenous Continuous Janessa Jones MD Stopped (05/26/23 0979)        Today, Patient Was Seen By: Yeni Hunt Arian, DO    **Please Note: This note may have been constructed using a voice recognition system. **

## 2023-06-27 NOTE — PHYSICAL THERAPY NOTE
PHYSICAL THERAPY NOTE          Patient Name: Jessica Montes  [id number] Date: 6/27/2023 06/27/23 1200   Note Type   Note Type Treatment   Pain Assessment   Pain Assessment Tool 0-10   Pain Score No Pain   Restrictions/Precautions   Weight Bearing Precautions Per Order No   Braces or Orthoses (S)  LSO  (when OOB or HOB >45 degrees)   Other Precautions Fall Risk;Pain;Multiple lines;Telemetry;O2; Chair Alarm; Bed Alarm   General   Chart Reviewed Yes   Family/Caregiver Present Yes   Cognition   Overall Cognitive Status Impaired   Arousal/Participation Cooperative   Attention Within functional limits   Orientation Level Oriented to person;Oriented to place;Oriented to time;Disoriented to situation   Memory Decreased recall of precautions   Following Commands Follows one step commands without difficulty   Comments Patient pleasant and cooperative, but requires increased time for processing and safety awareness   Subjective   Subjective Patient agreable to therapy   Bed Mobility   Supine to Sit 2  Maximal assistance   Additional items Assist x 2; Increased time required;LE management;Verbal cues;HOB elevated   Additional Comments max Ax2, 3rd nearby. sat EOB w min A/S   Transfers   Sit to Stand 2  Maximal assistance   Additional items Assist x 2; Increased time required;Verbal cues   Stand to Sit 2  Maximal assistance   Additional items Assist x 2; Increased time required;Verbal cues   Stand pivot 2  Maximal assistance   Additional items Assist x 2; Increased time required;Verbal cues   Sit pivot 2  Maximal assistance   Additional items Assist x 2; Increased time required;Verbal cues   Additional Comments b/l HHA And knee blocking, vc for hand placement and inc overall safety awareness.    Ambulation/Elevation   Gait pattern Not tested  (stand pivot, difficulty with stepping)   Balance   Static Sitting Poor +   Dynamic Sitting Poor   Static Standing Poor   Dynamic Standing Poor   Ambulatory Zero   Endurance Deficit   Endurance Deficit Yes   Activity Tolerance   Activity Tolerance Patient limited by fatigue;Patient limited by pain   Medical Staff Made Aware JANET Bull   Nurse Made Aware RN cleared   Exercises   Balance training  seated balance EOB able to maintain supervision   Assessment   Prognosis Good   Problem List Decreased strength;Decreased range of motion;Decreased endurance; Impaired balance;Decreased mobility; Decreased coordination;Decreased cognition;Decreased safety awareness; Impaired judgement;Pain   Assessment Patient received in bed. Patient agreeable to therapy. Patient performs bed mobility with Max Ax2. Patient sits EOB with fair balance, supervision. Patient performs functional transfers with Max Ax2, STS x2. Patient performs stand pivot transfer with Max Ax2. Sit pivot utilized for positioning in chair with Max Ax2. Patient with difficulty lifting BLE for stepping, and thus ambulation not appropriate at this time. Patient left in bedside chair with all needs met and call bell/personal items within reach. The patient's AM-PAC Basic Mobility Inpatient Short Form Raw Score is 6 showing further need for skilled PT services in order to improve functional mobility, decrease need for assistance, and return to PLOF. A Raw score of less than 16 suggests the patient may benefit from discharge to post-acute rehabilitation services. PT continues to recommend rehab on d/c. Will continue to follow as able. Goals   Patient Goals to improve mobility   Plan   Treatment/Interventions ADL retraining;Functional transfer training;LE strengthening/ROM; Therapeutic exercise; Endurance training;Patient/family training;Equipment eval/education;Cognitive reorientation; Bed mobility;Gait training;Spoke to nursing;Spoke to case management;OT   Progress Slow progress, decreased activity tolerance   PT Frequency 3-5x/wk   Recommendation   PT Discharge Recommendation Post acute rehabilitation services   AM-PAC Basic Mobility Inpatient   Turning in Flat Bed Without Bedrails 1   Lying on Back to Sitting on Edge of Flat Bed Without Bedrails 1   Moving Bed to Chair 1   Standing Up From Chair Using Arms 1   Walk in Room 1   Climb 3-5 Stairs With Railing 1   Basic Mobility Inpatient Raw Score 6   Highest Level Of Mobility   JH-HLM Goal 2: Bed activities/Dependent transfer   JH-HLM Achieved 4: Move to chair/commode   End of Consult   Patient Position at End of Consult Bedside chair; All needs within reach       Alvaro Andrew, PT, DPT

## 2023-06-27 NOTE — ASSESSMENT & PLAN NOTE
2/2 MDR E.coli urosepsis. Briefly required ICU stay and pressor support. Currently off pressors. · UCx shows Ecoli and klebsiella both susceptible to ceftriaxone  · Completed ceftriaxone for a total abx duration of 7 days.

## 2023-06-27 NOTE — CONSULTS
Consultation - Geriatrics   Criss Villagomez 81 y.o. male MRN: [id number]  Unit/Bed#: [facility] 504-01 Encounter: [id number]      Assessment/Plan  Acute metabolic Encephalopathy  Alert and oriented x 3  Per wife improved today with decreased confusion  Multifactorial, sepsis, IV cefepime, pain, pain medications  Maintain delirium precautions:  Provide frequent redirection, reorientation, distraction techniques  Avoid deliriogenic medications such as tramadol, benzodiazepines, anticholinergics,  Benadryl  Treat pain, See geriatric pain protocol  Monitor for constipation- last BM 6/25/23  Monitor for  urinary retention  Encourage early and frequent moblization, OOB  Encourage Hydration/ Nutrition  Implement sleep hygiene, limit night time interuptions, group activities    Recommend addition of gabapentin 300 mg via NGT QHS to assist with pain and insomnia  Pt reports taking gabapentin BID at home    Acute on chronic low back pain  CT L spine, acute L1 fracture, chronic L4 fracture  Neurosurgery and palliative on consult  LSO brace  IV dilaudid per palliative   Lidocaine transdermal   ATC acetaminophen  PT/OT    Recommend start of HS gabapentin via NGT     Insomnia  Chronic exacerbated by hospitalization  Takes melatonin at home  Recommend restart of gabapentin QHS as above    Frailty  Clinical Frail Scale: 6- Moderately Frail  Has supportive family   PT/OT  NGT with tube feeding  Rehab post hospitalization      Cognitive screening  Denies prior memory loss  TSH 3.903 (6/15/23)  Recommend check Vitamin B 12  CT head (6/7/23) moderate microangiopathic changes    REMA  Nephrology on consult  Cr on admission 8.7, with hyperkalemia started on HD 6/14 then CRRT until 6/23  Continue lasix     Septic shock with MDR E coli urosepsis  Completed IV ceftriaxone  Recurrent fevers restarted on ceftriaxone    Dysphagia  On tube feeding via NGT  Speech on consult    Advanced Care Planning  Full code  Palliative on consult for goal of care  PEG tube? Home medication review  Christopher   Sotalol 80 mg po daily  Torsemide 40 mg po BID  KCL 20 meq po daily  Gabapentin 300 mg po daily, last refill 3/20/23 # 90 days  atorvastatin 40 mg po daily with dinner 4/27/23 # 90 days  Norco 5/325 prn  Coumadin as directed   Metformin   01/15/2023  01/12/2023   1  Hydrocodone-Acetamin 5-325 Mg 90.00  30  Mi Nim  [id number]   Wal ([id number])   15.00 MME  Comm Ins  [place]    12/12/2022 12/08/2022   1  Hydrocodone-Acetamin 5-325 Mg 90.00  30  Mi Nim  [id number]   Wal ([id number])   15.00 MME  Comm Ins  [place]    10/28/2022  10/27/2022   1  Hydrocodone-Acetamin 5-325 Mg 90.00  30  Mi Nim  [id number]   Wal ([id number])              History of Present Illness   Physician Requesting Consult: Ramírez Ramesh DO  Reason for Consult / Principal Problem: delirium  Hx and PE limited by: NA  HPI: Pilar Diego is a 81y.o. year old male who presents with generalized weakness, fatigue, loss of appetite x 2 days. Work up in ED showed UTI, hypoglycemia, lactic acidosis, potassium greater than 7. He was seen by nephrology, admitted to critical care. On arrival to critical care he was oriented but becoming dysarthric. Additionally he became bradycardic with widening QRS waves. He was started on HD. He has afib, DM2, anemia, HF, CKD3,     Prior to arrival he lives with his wife and daughter. He needs assistance with IADLs. He is indpendent with ADLs. He ambulates with a roller walker. He denies prior falls. He wears glasses. Upon exam pt is lying in bed. He is alert and oriented x 3, able to answer questions appropriately  Wife at bedside who reports minimal confusion today, improved since yesterday    Rounded with nursing and palliative in collaboration of care      Inpatient consult to Gerontology  Consult performed by: ODESSA Eid  Consult ordered by: Isaura Conley PA-C          Review of Systems   Constitutional: Negative for unexpected weight change. HENT: Negative for hearing loss. Eyes: Negative for visual disturbance. Respiratory: Positive for cough. Cardiovascular: Negative for chest pain. Gastrointestinal: Negative for constipation. Genitourinary: Negative for urgency. Musculoskeletal: Positive for gait problem. Neurological: Positive for weakness. Psychiatric/Behavioral: Positive for sleep disturbance.        Historical Information   Past Medical History:   Diagnosis Date   • Anemia    • Arthritis    • Atrial fibrillation ([address])    • Basal cell carcinoma 03/22/2022    Tip of Nose   • CHF (congestive heart failure) (HCC)    • Diabetes mellitus (HCC)     Niddm   • DVT (deep vein thrombosis) in pregnancy 1966    not in pregnancy   • DVT (deep venous thrombosis) ([address]) 1966   • Dyslipidemia    • Encephalopathy acute 11/4/2022   • GERD (gastroesophageal reflux disease)    • Hyperlipidemia    • Hypertension    • Hypomagnesemia 10/19/2022   • Irregular heart beat     Afib   • Morbid obesity due to excess calories ([address])     Resolved 9/2/2014    • Pulmonary embolism (HCC)    • Sepsis ([address])    • Squamous cell skin cancer 07/30/2020    Left posterior scalp   • Visual impairment      Past Surgical History:   Procedure Laterality Date   • AORTIC VALVE REPLACEMENT     • CARDIAC DEFIBRILLATOR PLACEMENT  04/2014   • CARDIAC SURGERY  02/2014    AVR   • COLONOSCOPY     • INSERT / REPLACE / REMOVE PACEMAKER     • IR ASPIRATION BAKERS CYST  3/8/2023   • IR CHOLECYSTOSTOMY TUBE CHECK/CHANGE/REPOSITION/REINSERTION/UPSIZE  10/13/2022   • IR CHOLECYSTOSTOMY TUBE CHECK/CHANGE/REPOSITION/REINSERTION/UPSIZE  10/19/2022   • IR CHOLECYSTOSTOMY TUBE CHECK/CHANGE/REPOSITION/REINSERTION/UPSIZE  10/27/2022   • IR CHOLECYSTOSTOMY TUBE CHECK/CHANGE/REPOSITION/REINSERTION/UPSIZE  11/7/2022   • IR CHOLECYSTOSTOMY TUBE CHECK/CHANGE/REPOSITION/REINSERTION/UPSIZE  11/10/2022   • IR CHOLECYSTOSTOMY TUBE CHECK/CHANGE/REPOSITION/REINSERTION/UPSIZE  12/1/2022   • IR CHOLECYSTOSTOMY TUBE CHECK/CHANGE/REPOSITION/REINSERTION/UPSIZE  1/6/2023   • IR CHOLECYSTOSTOMY TUBE CHECK/CHANGE/REPOSITION/REINSERTION/UPSIZE  1/24/2023   • IR CHOLECYSTOSTOMY TUBE CHECK/CHANGE/REPOSITION/REINSERTION/UPSIZE  3/15/2023   • IR CHOLECYSTOSTOMY TUBE PLACEMENT  9/1/2022   • IR DRAINAGE TUBE CHECK AND/OR REMOVAL  3/22/2023   • IR DRAINAGE TUBE CHECK AND/OR REMOVAL  4/10/2023   • IR DRAINAGE TUBE PLACEMENT  4/6/2023   • IR TUNNELED DIALYSIS CATHETER PLACEMENT  6/19/2023   • JOINT REPLACEMENT Left     LTKR   • MOHS SURGERY  07/30/2020    Left posterior scalp, Dr. Mendy Leal   • [address]  04/18/2022    BCC Tip of Nose- Dr. Mendy Leal   • Zenovia Grass DIVJ&STRIP LONG SAPH SAPHFEM Melany Sharp Right 8/17/2018    Procedure: LEG PERFORATED INJECTION SCLEROTHERAPY;  Surgeon: Jovani Coronel MD;  Location: [facility]  MAIN [facility];  Service: Vascular   • REPLACEMENT TOTAL KNEE Right    • TOTAL KNEE ARTHROPLASTY Left    • VASCULAR SURGERY     • VENA CAVA FILTER PLACEMENT      Interruption inferior vena cava, Josue filter, placement   • WISDOM TOOTH EXTRACTION       Social History   Social History     Substance and Sexual Activity   Alcohol Use Not Currently    Comment: no alcohol in 28 yrs     Social History     Substance and Sexual Activity   Drug Use Never     Social History     Tobacco Use   Smoking Status Never   Smokeless Tobacco Never         Family History:   Family History   Problem Relation Age of Onset   • Arthritis Mother    • Stroke Mother    • Arthritis Father    • Cancer Father    • Arthritis Daughter        Meds/Allergies   Current meds:   Current Facility-Administered Medications   Medication Dose Route Frequency   • acetaminophen (TYLENOL) oral suspension 650 mg  650 mg Per NG Tube Q6H   • bisacodyl (DULCOLAX) rectal suppository 10 mg  10 mg Rectal Daily   • cefTRIAXone (ROCEPHIN) 1,000 mg in dextrose 5 % 50 mL IVPB  1,000 mg Intravenous Q24H   • diphenhydramine, lidocaine, Al/Mg hydroxide, simethicone (Magic Mouthwash) oral solution 10 mL  10 mL Swish & Spit Q6H PRN   • furosemide (LASIX) injection 40 mg  40 mg Intravenous Daily   • heparin (porcine) subcutaneous injection 5,000 Units  5,000 Units Subcutaneous Q8H [facility] & assisted   • HYDROmorphone HCl (DILAUDID) injection 0.2 mg  0.2 mg Intravenous Q4H PRN    Or   • HYDROmorphone (DILAUDID) injection 0.5 mg  0.5 mg Intravenous Q4H PRN   • HYDROmorphone (DILAUDID) injection 1 mg  1 mg Intravenous Q4H PRN   • insulin lispro (HumaLOG) 100 units/mL subcutaneous injection 2-12 Units  2-12 Units Subcutaneous Q6H   • lidocaine (LIDODERM) 5 % patch 1 patch  1 patch Topical Daily   • lidocaine (LMX) 4 % cream   Topical 4x Daily PRN   • melatonin tablet 6 mg  6 mg Oral HS   • naloxone (NARCAN) 0.04 mg/mL syringe 0.04 mg  0.04 mg Intravenous Q1MIN PRN   • nystatin (MYCOSTATIN) powder   Topical BID   • pantoprazole (PROTONIX) injection 40 mg  40 mg Intravenous Q24H AMANDA   • trimethobenzamide (TIGAN) IM injection 200 mg  200 mg Intramuscular Q6H PRN     Facility-Administered Medications Ordered in Other Encounters   Medication Dose Route Frequency   • sodium chloride 0.9 % infusion  75 mL/hr Intravenous Continuous          Allergies   Allergen Reactions   • Tramadol Other (See Comments)     intolerance       Objective   Vitals: Blood pressure 115/65, pulse (!) 111, temperature 98 °F (36.7 °C), temperature source Oral, resp. rate 20, height 6' 1" (1.854 m), weight (!) 141 kg (311 lb 15.2 oz), SpO2 93 %. ,Body mass index is 41.16 kg/m². Physical Exam  Vitals and nursing note reviewed. HENT:      Head: Normocephalic. Nose: No congestion. Mouth/Throat:      Mouth: Mucous membranes are moist.   Eyes:      General:         Right eye: No discharge. Left eye: No discharge. Cardiovascular:      Rate and Rhythm: Normal rate. Rhythm irregular. Pulses: Normal pulses. Pulmonary:      Breath sounds: Rhonchi present.       Comments: Loose moist non productive cough  Abdominal:      General: Bowel sounds are normal. There is distension. Palpations: Abdomen is soft. Musculoskeletal:         General: Normal range of motion. Cervical back: Normal range of motion. Skin:     General: Skin is warm and dry. Neurological:      Mental Status: He is alert and oriented to person, place, and time. Mental status is at baseline. Psychiatric:         Mood and Affect: Mood normal.         Lab Results:   Results from last 7 days   Lab Units 06/27/23  0522   WBC Thousand/uL 10.08   HEMOGLOBIN g/dL 7.6*   HEMATOCRIT % 26.0*   PLATELETS Thousands/uL 205        Results from last 7 days   Lab Units 06/27/23  0522   POTASSIUM mmol/L 4.5   CHLORIDE mmol/L 110*   CO2 mmol/L 30   BUN mg/dL 34*   CREATININE mg/dL 1.56*   CALCIUM mg/dL 8.4       Imaging Studies: I have personally reviewed pertinent reports. EKG, Pathology, and Other Studies: I have personally reviewed pertinent reports.     VTE Prophylaxis: Sequential compression device (Venodyne)     Code Status: Level 1 - Full Code

## 2023-06-27 NOTE — TELEMEDICINE
e-Consult (IPC)  - Interventional Radiology  Gomez Eric 81 y.o. male MRN: [id number]  Unit/Bed#: [facility] 504-01 Encounter: [id number]          Interventional Radiology has been consulted to evaluate Gomez Eric    We were consulted by Nephrology concerning this patient with tunneled dialysis catheter. Inpatient Consult to IR  Consult performed by: [facility], JORI  Consult ordered by: Luisito Dowell PA-C        06/27/23    Assessment/Recommendation:   Gomez Eric, 88y male with a recent history of REMA. Patient was on CVVH and was transitioned to hemodialysis. Patient is s/p tunneled dialysis catheter placement on 6/19/23. Patient with renal recovery not currently receiving dialysis. Nephrology is requesting removal of tunneled HD catheter tomorrow, pending BMP results. Will plan for right tunneled HD catheter removal tomorrow - pending BMP - timing to be determined by team availability. Patient does not need to be NPO for this procedure. Procedure performed with local anesthetic. 5-10 minutes, >50% of the total time devoted to medical consultative verbal/EMR discussion between providers. Written report will be generated in the EMR. Thank you for allowing Interventional Radiology to participate in the care of Gomez Eric. Please don't hesitate to call or TigerText us with any questions.      [facility]

## 2023-06-27 NOTE — PROGRESS NOTES
Progress Note- Krista Poole 81 y.o. male MRN: [id number]    Unit/Bed#: [facility] 504-01 Encounter: [id number]      Assessment and Plan:    81years old male with past medical history of A-fib on sotalol, Coumadin, history of V. tach s/p ICD placement, diastolic CHF, diabetes, chronic L4 fracture with recent acute L1 fracture managed conservatively who was admitted to the hospital with urosepsis from MDR E. coli and septic shock requiring ICU care and pressor support briefly treated with 7 days of ceftriaxone. Stay was complicated by developing REMA from sepsis requiring CVVHD initially followed by intermittent HD by nephrology. GI was consulted for melena and PEG tube placement. 1.  Evaluation for PEG tube placement  - GI was consulted for further evaluation dysphagia    2. Acute on chronic anemia and melena  - Was noted to have intermittent small-volume melena   - S/p EGD showing single small superficial ulcer in first part of duodenum. Chandana III. - Baseline hemoglobin appears to be around 7-8  - S/p 2 unit PRBC this admission  - No further episodes of hematemesis or melena or hematochezia. - Hemoglobin appears to be stable at baseline.  - Currently anticoagulation is on hold for possible PEG tube evaluation    3. REMA requiring CVVHD followed by intermittent HD  - IR planning to remove catheter tomorrow    4. Dysphagia  - Patient with significant oropharyngeal dysphagia also complaining of worsening solid food dysphagia gradually worsening since months. Was able to tolerate liquid diet. - States appetite has been gradually decreasing over months  - Around 50 pound weight loss in 1 year which was unintentional  - Prior history of dysphagia which required dilation however does not remember the reason it was many years ago  - Recent EGD showing nonobstructive Schatzki's ring, no interventions were done  - No esophageal biopsies were taken to rule out EOE    5.   Aspiration  - Patient had NG tube placement for feeding on 6/22/2023 after that was noted to have 1 fever Tmax of 101 on 6/24/2023 with chest x-ray showing signs of aspiration pneumonia likely currently on antibiotic again. 6.  Mild gastritis and duodenitis    7. Nonobstructive Schatzki's ring at the GE junction was noted no interventions were done    Plan:-  - Tentative n.p.o. from midnight  - Patient and wife explained regarding the risk versus benefit of the procedure  - Also explained regarding still chances of aspiration  After PEG tube placement  - No interventions planned for now  - Patient and family members wanted to discuss further and will let us know  - Continue PPI IV twice daily can change to oral via NG tube  - Continue to hold Coumadin  - Will check INR level tomorrow morning    Thank you for the consultation. We will continue to follow. ______________________________________________________________________    Subjective:     Patient seen and examined at bedside. Accompanied by significant other at bedside. Reports feeling well overall. NG tube in place. Has been receiving tube feeding well. No abdominal pain, diarrhea, nausea vomiting, chest pain, fever overnight.     Medication Administration - last 24 hours from 06/26/2023 1414 to 06/27/2023 1414       Date/Time Order Dose Route Action Action by     06/27/2023 0843 EDT lidocaine (LIDODERM) 5 % patch 1 patch 1 patch Topical Medication Applied Carlos Gutierrez RN     06/26/2023 2048 EDT lidocaine (LIDODERM) 5 % patch 1 patch 1 patch Topical Patch Removed John Sanches RN     06/27/2023 0595 EDT pantoprazole (PROTONIX) injection 40 mg 40 mg Intravenous Given Carlos Gutierrez RN     06/27/2023 0894 EDT bisacodyl (DULCOLAX) rectal suppository 10 mg 10 mg Rectal Given Carlos Gutierrez RN     06/27/2023 1216 EDT insulin lispro (HumaLOG) 100 units/mL subcutaneous injection 2-12 Units 2 Units Subcutaneous Given Carlos Gutierrez RN     06/27/2023 3461 EDT insulin lispro (HumaLOG) 100 units/mL subcutaneous injection 2-12 Units 4 Units Subcutaneous Given Boni Meier, RN     06/27/2023 0014 EDT insulin lispro (HumaLOG) 100 units/mL subcutaneous injection 2-12 Units -- Subcutaneous Not Given Melanie Mahmood, RN     06/26/2023 1838 EDT insulin lispro (HumaLOG) 100 units/mL subcutaneous injection 2-12 Units 4 Units Subcutaneous Given Trinway Alireza, RN     06/26/2023 2047 EDT melatonin tablet 6 mg 6 mg Oral Given Trinway Alireza, RN     06/27/2023 8090 EDT heparin (porcine) subcutaneous injection 5,000 Units 5,000 Units Subcutaneous Given Boni Meier, RN     06/26/2023 2252 EDT heparin (porcine) subcutaneous injection 5,000 Units -- Subcutaneous Canceled Entry Trinway Alireza, RN     06/26/2023 2047 EDT heparin (porcine) subcutaneous injection 5,000 Units 5,000 Units Subcutaneous Given Trinway Alireza, RN     06/27/2023 7132 EDT nystatin (MYCOSTATIN) powder -- Topical Given Boni Meier, RN     06/26/2023 1841 EDT nystatin (MYCOSTATIN) powder 0 Application Topical Hold Trinway Alireza, RN     06/27/2023 1215 EDT acetaminophen (TYLENOL) oral suspension 650 mg 650 mg Per NG Tube Given Boni Meier, RN     06/27/2023 2103 EDT acetaminophen (TYLENOL) oral suspension 650 mg 650 mg Per NG Tube Given Boni Meier, RN     06/27/2023 0021 EDT acetaminophen (TYLENOL) oral suspension 650 mg 650 mg Per NG Tube Given Melanie Mahmood, RN     06/26/2023 1742 EDT acetaminophen (TYLENOL) oral suspension 650 mg 650 mg Per NG Tube Given Trinway Alireza, RN     06/27/2023 7150 EDT furosemide (LASIX) injection 40 mg 40 mg Intravenous Given Boni Meier, RN     06/26/2023 1532 EDT oxyCODONE (ROXICODONE) oral solution 5 mg 5 mg Oral Given Trinway Alireza, RN     06/26/2023 1742 EDT HYDROmorphone (DILAUDID) injection 1 mg 1 mg Intravenous Given [facility], RN     06/27/2023 0128 EDT HYDROmorphone HCl (DILAUDID) injection 0.2 mg -- Intravenous See Alternative Melanie Mahmood RN     06/27/2023 0128 EDT HYDROmorphone (DILAUDID) injection 0.5 mg 0.5 mg Intravenous Given Mariusz Long RN     06/26/2023 2056 EDT cefTRIAXone (ROCEPHIN) 1,000 mg in dextrose 5 % 50 mL IVPB 1,000 mg Intravenous [address] [facility] Sheree, JEET     06/27/2023 1215 EDT oxyCODONE (ROXICODONE) oral solution 5 mg -- Per NG Tube See Alternative Mary Jacinto, JEET     06/27/2023 1215 EDT oxyCODONE (ROXICODONE) oral solution 7.5 mg 7.5 mg Oral Given Mary Jacinto, JEET          Objective:     Vitals: Blood pressure 144/83, pulse (!) 124, temperature 98 °F (36.7 °C), temperature source Oral, resp. rate 14, height 6' 1" (1.854 m), weight (!) 141 kg (311 lb 15.2 oz), SpO2 93 %. ,Body mass index is 41.16 kg/m². Intake/Output Summary (Last 24 hours) at 6/27/2023 1414  Last data filed at 6/27/2023 1155  Gross per 24 hour   Intake 881 ml   Output 1300 ml   Net -419 ml       Physical Exam:   General Appearance: Awake and alert, in no acute distress, NG tube in place  Abdomen: Soft, non-tender, non-distended; bowel sounds normal; no masses or no organomegaly    Invasive Devices     Peripheral Intravenous Line  Duration           Peripheral IV 06/26/23 Dorsal (posterior); Left Forearm 1 day          Hemodialysis Catheter  Duration           HD Permanent Double Catheter 7 days          Drain  Duration           NG/OG Tube Nasogastric Left nare 5 days    Urethral Catheter Non-latex 16 Fr. 2 days                Lab Results:  No results displayed because visit has over 200 results. Imaging Studies: I have personally reviewed pertinent imaging studies.

## 2023-06-27 NOTE — ASSESSMENT & PLAN NOTE
Home regimen of Sotalol and warfarin, both held on admission  · Continue to hold Coumadin  · Restart sotalol today

## 2023-06-27 NOTE — PROGRESS NOTES
Pt w/diaphoresis, hot to touch. Rectal temp done= 100.2. -120's since this morning. S/W  and Lactic acid and IV ABX ordered. Pt also appears more confused than prior, although was recently given PO Roxicodone. Cold washcloth applied to forehead. Will f/u with [facility]IM PA once Lactic acid results.

## 2023-06-27 NOTE — ASSESSMENT & PLAN NOTE
Patient had VBS with speech   · Unfortunately he is aspiration with both solid and liquid. · Speech recommends PEG tube. · GI recommends trial of NG tube first prior to committing to PEG tube. · Started NG tube feeding 6/22, currently at goal, tolerating. · Repeat VBS 6/26 with worsening aspiration, GI updated.  Will plan to evaluate today  with likely PEG tube placement this week

## 2023-06-27 NOTE — ASSESSMENT & PLAN NOTE
Patient spiked a fever of 101.4  6/24/23. S/p  Rocephin x 7 days for urosepsis earlier in the admission. Currently afebrile  Denies abdominal pain, dyspnea, chest pain, nausea, vomiting.    CXR with mild pulmonary edema, procal and lactic acid negative  Negative blood cultures  UA negative for WBC or nitrites  Patient was started on ceftriaxone on 6/26 for possible aspiration  Continue to monitor RTC 4 wks

## 2023-06-27 NOTE — ASSESSMENT & PLAN NOTE
Wt Readings from Last 3 Encounters:   06/27/23 (!) 141 kg (311 lb 15.2 oz)   06/07/23 136 kg (299 lb 13.2 oz)   06/02/23 130 kg (285 lb 8 oz)       Patient with a hx of [id number]%.   Currently on IV Lasix per nephrology  Monitor volume status

## 2023-06-27 NOTE — ASSESSMENT & PLAN NOTE
Patient downgraded from critical care, admitted for septic shock secondary to UTI leading to RMEA requiring dialysis.  Cr improving  · Likely UTI was the initial cause of REMA, exacerbated by concurrent metformin a  · Combs currently in place as pt noted to have urinary incontinence w/ skin breakdown,  · CVVH discontinued 6/19  · tunneled dialysis catheter placed on 6/19, on intermittent hemodialysis with a plan to remove catheter tomorrow by IR  · Nephrology following, continue with IV lasix 40 mg daily   · Renal function improving

## 2023-06-27 NOTE — ASSESSMENT & PLAN NOTE
Lab Results   Component Value Date    HGBA1C 5.0 06/15/2023       Recent Labs     06/26/23  1800 06/27/23  0011 06/27/23  0612 06/27/23  1105   POCGLU 202* 142* 201* 161*       Blood Sugar Average: Last 72 hrs:  (P) 175.2   Lab Results   Component Value Date    HGBA1C 5.0 06/15/2023       Recent Labs     06/26/23  1800 06/27/23  0011 06/27/23  0612 06/27/23  1105   POCGLU 202* 142* 201* 161*       Blood Sugar Average: Last 72 hrs:  (P) 175.2     Plan:  · Home regimen of metformin held in the setting of REMA and metabolic acidosis.    · Continue with SSI QID   · Add Lantus nightly

## 2023-06-27 NOTE — PROGRESS NOTES
Progress note - Palliative and Supportive Care   Manju Shearer 81 y.o. male [id number]    Patient Active Problem List   Diagnosis   • Obesity, unspecified obesity severity, unspecified obesity type   • Diabetes ([address])   • Hydrocele   • Chronic anticoagulation   • Acute on chronic anemia   • Atrial fibrillation (HCC)   • History of AVR   • PVD (peripheral vascular disease) (HCC)   • Thrombophlebitis   • History of ventricular tachycardia   • Edema   • Acute on chronic diastolic CHF   • Venous ulcer of right ankle   • Peripheral venous insufficiency   • Ventricular tachycardia   • Secondary lymphedema   • CKD (chronic kidney disease) stage 3, GFR 30-59 ml/min (HCC)   • Morbid obesity   • Benign prostatic hyperplasia with lower urinary tract symptoms   • Acute cholecystitis   • REMA (acute kidney injury) ([address])   • Cardiomegaly   • Chronic bilateral low back pain without sciatica   • 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   • Dyslipidemia   • Endocarditis   • Hyperlipidemia   • Osteoarthritis, knee   • Renal cyst   • Status post right knee replacement   • Swelling of limb   • BPH (benign prostatic hyperplasia)   • History of venous thromboembolism   • Acute on chronic cholecystitis   • Cholecystostomy care [facility])   • Gout   • Calculus of gallbladder   • Extrahepatic biloma   • COVID-19   • Pleural effusion on right   • Melena   • Pulmonary embolism (HCC)   • CHF (congestive heart failure) (HCC)   • Fall   • Acidosis   • Hyponatremia   • Hypophosphatemia   • Prolonged QT interval   • Thrombocytopenia (HCC)   • Septic shock (HCC)   • Dysphagia   • Acute low back pain   • Fever     Active issues specifically addressed today include:   Dysphagia, NG tube in place, VVS on Monday  Severe sepsis secondary to UTI  Acute on chronic low back pain  Heart failure preserved EF, EF 70%  REMA, currently requiring dialysis  Palliative care patient  Frailty in an elderly adult  Goals of care     Plan:  1.  Symptom management -   Pain   -due to patient's ongoing pain from his acute L1 vertebral fracture and difficulty with achieving any rest due to his ongoing pain. -  Addition of PO/NG pain medication  Oxycodone 5mg - 7.5 Q 4 hours prn pain  Hydromorphone 0.5 Q 4 hours breakthrough pain  Restart gabapentin 300 mg Q hs              -  IV Narcan has been ordered as a precautions. 2. Goals -   -  No limits on cares. -  Would like to purse PEG tube at this time for nutritional support. Code Status: Full Code - Level 1   Decisional apparatus:  Patient is marginally competent on my exam today. If competence is lost, patient's substitute decision maker would default to spouse [place] 169. Advance Directive / Living Will / POLST:  None on file     Interval history:       The patient is seen sitting out of bed with his wife Davidson Limon present. The patient is having lumbar pain. Regimen switched to enteral route with Iv for breakthrough pain. Reviewed need for PEG tube. Both the patient and his wife express wanting to continue with all disease focused life prolonging care available. They are encouraged as the patient no longer requires HD. HD catheter to come out. They understand the patient will likely need PT OT and inpatient rehab upon discharge.       MEDICATIONS / ALLERGIES:     current meds:   Current Facility-Administered Medications   Medication Dose Route Frequency   • acetaminophen (TYLENOL) oral suspension 650 mg  650 mg Per NG Tube Q6H   • bisacodyl (DULCOLAX) rectal suppository 10 mg  10 mg Rectal Daily   • cefTRIAXone (ROCEPHIN) 1,000 mg in dextrose 5 % 50 mL IVPB  1,000 mg Intravenous Q24H   • diphenhydramine, lidocaine, Al/Mg hydroxide, simethicone (Magic Mouthwash) oral solution 10 mL  10 mL Swish & Spit Q6H PRN   • furosemide (LASIX) injection 40 mg  40 mg Intravenous Daily   • gabapentin (NEURONTIN) capsule 300 mg  300 mg Oral HS   • heparin (porcine) subcutaneous injection 5,000 Units  5,000 Units Subcutaneous Q8H [address]   • HYDROmorphone (DILAUDID) injection 0.5 mg  0.5 mg Intravenous Q4H PRN   • insulin glargine (LANTUS) subcutaneous injection 10 Units 0.1 mL  10 Units Subcutaneous HS   • insulin lispro (HumaLOG) 100 units/mL subcutaneous injection 2-12 Units  2-12 Units Subcutaneous Q6H   • lidocaine (LIDODERM) 5 % patch 1 patch  1 patch Topical Daily   • lidocaine (LMX) 4 % cream   Topical 4x Daily PRN   • melatonin tablet 6 mg  6 mg Oral HS   • naloxone (NARCAN) 0.04 mg/mL syringe 0.04 mg  0.04 mg Intravenous Q1MIN PRN   • nystatin (MYCOSTATIN) powder   Topical BID   • oxyCODONE (ROXICODONE) oral solution 5 mg  5 mg Per NG Tube Q4H PRN    Or   • oxyCODONE (ROXICODONE) oral solution 7.5 mg  7.5 mg Oral Q4H PRN   • pantoprazole (PROTONIX) injection 40 mg  40 mg Intravenous Q24H AMANDA   • sotalol (BETAPACE) tablet 80 mg  80 mg Oral Daily   • trimethobenzamide (TIGAN) IM injection 200 mg  200 mg Intramuscular Q6H PRN     Facility-Administered Medications Ordered in Other Encounters   Medication Dose Route Frequency   • sodium chloride 0.9 % infusion  75 mL/hr Intravenous Continuous       Allergies   Allergen Reactions   • Tramadol Other (See Comments)     intolerance       OBJECTIVE:    Physical Exam  Physical Exam  Vitals and nursing note reviewed. Constitutional:       General: He is not in acute distress. Appearance: He is well-developed. He is ill-appearing. Interventions: Nasal cannula in place. HENT:      Head: Normocephalic and atraumatic. Eyes:      Conjunctiva/sclera: Conjunctivae normal.   Cardiovascular:      Rate and Rhythm: Normal rate and regular rhythm. Heart sounds: No murmur heard. Pulmonary:      Effort: Pulmonary effort is normal. No respiratory distress. Breath sounds: Normal breath sounds. Abdominal:      Palpations: Abdomen is soft. Tenderness: There is no abdominal tenderness. Musculoskeletal:         General: No swelling.       Cervical back: Neck supple. Skin:     General: Skin is cool and dry. Capillary Refill: Capillary refill takes less than 2 seconds. Comments: Right CW HD cath   Neurological:      General: No focal deficit present. Mental Status: He is alert. Comments: Forgetful    Psychiatric:         Mood and Affect: Affect is flat. Lab Results:   CBC:   Lab Results   Component Value Date    WBC 10.08 06/27/2023    HGB 7.6 (L) 06/27/2023    HCT 26.0 (L) 06/27/2023     (H) 06/27/2023     06/27/2023    RBC 2.48 (L) 06/27/2023    MCH 30.6 06/27/2023    MCHC 29.2 (L) 06/27/2023    RDW 20.5 (H) 06/27/2023    MPV 11.1 06/27/2023   , CMP:   Lab Results   Component Value Date    SODIUM 143 06/27/2023    K 4.5 06/27/2023     (H) 06/27/2023    CO2 30 06/27/2023    BUN 34 (H) 06/27/2023    CREATININE 1.56 (H) 06/27/2023    CALCIUM 8.4 06/27/2023    EGFR 39 06/27/2023   , PT/PTT:No results found for: "PT", "PTT"    Counseling / Coordination of Care    Total floor / unit time spent today 30+  minutes. Greater than 50% of total time was spent with the patient and / or family counseling and / or coordination of care.  A description of the counseling / coordination of care: review of chart, goals of care, supportive listening, offerd information

## 2023-06-27 NOTE — PROGRESS NOTES
NEPHROLOGY PROGRESS NOTE   Jose Boone 81 y.o. male MRN: [id number]  Unit/Bed#: [facility] 504-01 Encounter: [id number]      ASSESSMENT/PLAN:  1. Acute kidney injury: Due to septic ATN +/- contrast associated nephropathy and diuretics. · Admitted with creatinine 8.7 and associated hyperkalemia and acidosis  · Initiated on IHD 6/14 due to hyperkalemia. Was then on CRRT until 6/19. Last dialysis treatment 6/23  · UA yesterday: +1 protein, 10-25 granular casts  · Appears to have renal recovery with good urine output and creatinine today relatively stable at 1.56  · Continue Lasix 40 mg IV daily (started 6/24) as he does examine volume overload and prior chest x-ray showed pulmonary edema  · Combs catheter placed 6/25 due to incontinence/concern for skin breakdown per discussion with nurse  · Access: Right IJ PermCath placed 6/19--we will consult IR for PermCath removal tomorrow  · Check a.m. BMP  2. CKD stage III: Baseline creatinine around 1.3-1.6  3. Septic shock with MDR E coli urosepsis:  Status posttreatment with ceftriaxone and was on pressors. · Now with recurrent fevers--restarted on ceftriaxone yesterday  4. Anemia: Hemoglobin 7.6. Previous concern for possible GI bleed and status post transfusion  5. Lactic acidosis: Concern for metformin toxicity. Now resolved. Repeat lactic acid yesterday 1.5  6. Diastolic CHF plus lymphedema: On Lasix 40 mg IV daily  · Chest x-ray 6/24: Mild pulmonary edema with small effusions, pneumonia not excluded  7. Dysphagia: Currently n.p.o. and getting tube feeds    Plan Summary:   • Continue Lasix 40 mg IV daily--consider transitioning to p.o. diuretics  • Check a.m. BMP  • IR consult to remove permcath tomorrow     SUBJECTIVE:  Intermittent shortness of breath. Bringing up a lot of mucus today according to wife.     OBJECTIVE:  Current Weight: Weight - Scale: (!) 141 kg (311 lb 15.2 oz)  Vitals:    06/27/23 0843   BP: 115/65   Pulse: (!) 111   Resp:    Temp:    SpO2: 93% Intake/Output Summary (Last 24 hours) at 6/27/2023 1022  Last data filed at 6/27/2023 0901  Gross per 24 hour   Intake 1354 ml   Output 1150 ml   Net 204 ml     General:  Ill appearing   Skin:  No rash  Eyes:  Sclerae anicteric, no periorbital edema   ENT: NGT in place   Neck:  Trachea midline, symmetric   Chest:  Decreased breath sounds   CVS:  Tachycardic   Abdomen:  Soft, nontender, nondistended  Neuro:  Awake and alert  Psych:  Appropriate affect  Extremities: + lower extremity edema   : alfred in place with clear urine output      Medications:  Scheduled Meds:  Current Facility-Administered Medications   Medication Dose Route Frequency Provider Last Rate   • acetaminophen  650 mg Per NG Tube Q6H Madelyn Sera Payne DO     • bisacodyl  10 mg Rectal Daily Rupa Vera MD     • cefTRIAXone  1,000 mg Intravenous Q24H Lashae Paredes DO 1,000 mg (06/26/23 2056)   • diphenhydramine, lidocaine, Al/Mg hydroxide, simethicone  10 mL Swish & Spit Q6H PRN Sourav Bautista MD     • furosemide  40 mg Intravenous Daily Luis Felipe Toribio MD     • heparin (porcine)  5,000 Units Subcutaneous Q8H [facility] Rupa Vera MD     • HYDROmorphone  0.2 mg Intravenous Q4H PRN Kaden Shiu, DO      Or   • HYDROmorphone  0.5 mg Intravenous Q4H PRN Kaden Gaitanu, DO     • HYDROmorphone  1 mg Intravenous Q4H PRN Kaden Felipe DO     • insulin lispro  2-12 Units Subcutaneous Q6H ODESSA Leone     • lidocaine  1 patch Topical Daily Sourav Bautista MD     • lidocaine   Topical 4x Daily PRN Sourav Bautista MD     • melatonin  6 mg Oral HS ODESSA Leone     • naloxone  0.04 mg Intravenous Q1MIN PRN Kaden Felipe DO     • nystatin   Topical BID Lashae Paredes DO     • pantoprazole  40 mg Intravenous Q24H [facility] Emeterio Cifuentes MD     • trimethobenzamide  200 mg Intramuscular Q6H PRN Sourav Bautista MD       Facility-Administered Medications Ordered in Other Encounters   Medication Dose Route Frequency Provider Last Rate   • sodium chloride  75 mL/hr Intravenous Continuous Larry Lam MD Stopped (05/26/23 3222)       PRN Meds:.•  diphenhydramine, lidocaine, Al/Mg hydroxide, simethicone  •  HYDROmorphone **OR** HYDROmorphone  •  HYDROmorphone  •  lidocaine  •  naloxone  •  trimethobenzamide    Laboratory Results:  Results from last 7 days   Lab Units 06/27/23  0522 06/26/23  0445 06/25/23  1220 06/25/23  6910 06/24/23  0525 06/23/23  0435 06/22/23  0355 06/21/23  0517   WBC Thousand/uL 10.08 11.19*  --   --  10.36* 10.13 9.44 11.47*   HEMOGLOBIN g/dL 7.6* 8.4*  --   --  8.1* 7.8* 8.2* 8.2*   HEMATOCRIT % 26.0* 28.9*  --   --  26.4* 26.7* 26.5* 26.1*   PLATELETS Thousands/uL 205 184  --   --  136* 110* 92* 69*   SODIUM mmol/L 143 142  --  137 139 140 139 138   POTASSIUM mmol/L 4.5 4.3 4.7 5.8* 3.9 4.0 4.0 3.9   CHLORIDE mmol/L 110* 113*  --  111* 108 108 106 106   CO2 mmol/L 30 28  --  26 28 27 30 27   BUN mg/dL 34* 27*  --  25 19 21 18 14   CREATININE mg/dL 1.56* 1.45*  --  1.62* 1.63* 1.90* 1.90* 1.61*   CALCIUM mg/dL 8.4 7.6*  --  8.2* 8.3 7.9* 8.1* 8.1*   MAGNESIUM mg/dL  --   --   --   --   --   --   --  1.8   PHOSPHORUS mg/dL  --  2.3  --   --   --  2.5  --  2.3

## 2023-06-27 NOTE — PLAN OF CARE
Problem: OCCUPATIONAL THERAPY ADULT  Goal: Performs self-care activities at highest level of function for planned discharge setting. See evaluation for individualized goals. Description: Treatment Interventions: ADL retraining, Functional transfer training, UE strengthening/ROM, Endurance training, Cognitive reorientation, Patient/family training, Equipment evaluation/education, Compensatory technique education, Continued evaluation, Energy conservation, Activityengagement          See flowsheet documentation for full assessment, interventions and recommendations. Outcome: Progressing  Note: Limitation: Decreased ADL status, Decreased UE strength, Decreased endurance, Decreased self-care trans, Decreased high-level ADLs  Prognosis: Fair  Assessment: Pt seen on this date for OT session focusing on ADL retraining, cognitive reorientation, body mechanics, transfer retraining, increasing activity tolerance/endurance and EOB sitting to increase ability to participate in ADL/functional tasks. Pt was found in bed and was left in chair w/ all needs within reach. Pt completed bed mob w max Ax2, STS trial 2x w max Ax2, stand pivot with max Ax2- 3rd assist close-by t/o all transfers and fm 2' inc fatigue. LB w total A. Pt w/ improvements in activity tolerance, adl task completion, however is still limited 2* decreased ADL/High-level ADL status, decreased activity tolerance/endurance, decreased cognition, decreased self-care trans, decreased safety awareness and insight to condition. The patient's raw score on the AM-PAC Daily Activity Inpatient Short Form is 14. A raw score of less than 19 suggests the patient may benefit from discharge to post-acute rehabilitation services. Please refer to the recommendation of the Occupational Therapist for safe discharge planning. Recommending pt D/C to STR when medically stable. Pt will continue to benefit from acute OT services to meet goals.      OT Discharge Recommendation: Post acute rehabilitation services

## 2023-06-27 NOTE — WOUND OSTOMY CARE
Consult Note - Wound   Franchester Nab 81 y.o. male MRN: [id number]  Unit/Bed#: [facility] 504-01 Encounter: [id number]        History and Present Illness:  Wound Care following patient for sacrum, b/l arm, and right lower leg wounds. Patient is mod/max assist for turning and repositioning. NG tube in place with continuous tube feedings running. Incontinent of bowel and urine managed via internal urinary catheter. Patient lying on regular mattress with green foam offloading wedges in place- especially Kreg mattress ordered for patient. BMI of 41.16 with significant skin folds. Wife present at bedside for assessment of area. Wound Care was re-consulted for new wounds on abdomen. Assessment Findings:   B/L heels are dry intact and katy with no skin loss or wounds present. Recommend preventative Hydraguard Cream and proper offloading/ repositioning. 1. POA Stage 2 Sacrum: RESOLVED. Area is now intact pink epithelial, blanchable tissue. No skin loss or drainage present. continue with calazime due to incontinence. 2. Mid Sacro-Buttocks MASD: likely moisture in etiology. Small area of pink blanchable partial thickness skin loss. Deedee-wound is hyperpigmented and fragile. No drainage noted. Recommend calazime due to incontinence. 3. Right Lower Leg/ Ankle Venous Ulcer: patient goes to outpatient wound center for management of wound. Likely venous in etiology. 2 areas of full thickness skin loss measured together. Wound measures smaller in size than it was on previous assessment. Wound beds are mix of 70% beefy red granulation tissue and 30% yellow slough tissue- wound bed appears moist. Deedee-wound is hyperpigmented, fragile, and scaly. Small serosanguineous drainage noted. Recommend continuing with adaptic, maxorb ag, abd and juanjo wrap to area. Continue to follow up with outpatient wound center for continued wound care to area.      4. Right Arm Skin Tear: irregular in shape area of partial thickness skin loss. Area measures smaller on this assessment. Wound bed is beefy red and bleeding. Foster-wound is intact. Scant sanguinous drainage noted. Recommend continuing with allevyn foam dressing to area for treatment. 5. Left Arm Skin Tear: irregular in shape area of partial thickness skin loss. Wound bed measures slightly smaller on assessment. Wound bed is beefy red in color and bleeding. Foster-wound is intact and fragile. Scant sanguinous drainage noted. Recommend continuing with allevyn foam dressing to area for treatment. 6. Right Proximal Thigh/ Pannus Fold Wounds: 2 areas of partial thickness skin loss measured and pictured together. More medial aspect is likely ITD from deep skin fold- area is irregular in shape with partial thickness skin loss. Wound bed is beefy red in color with a fragile foster-wound. Scant to small serosanguineous drainage noted- maxorb rope applied to area. More Lateral Aspect is likely old femoral line site per primary RN- area is circular in shape with partial thickness skin loss. Scant serosanguineous drainage noted from this area. Wound bed is yellow in color with a fragile foster-wound. Recommend maxorb to area. No induration, fluctuance, odor, warmth/temperature differences, redness, or purulence noted to the above noted wounds and skin areas assessed. New dressings applied per orders listed below. Patient tolerated well- no s/s of non-verbal pain or discomfort observed during the encounter. Bedside nurse aware of plan of care. See flow sheets for more detailed assessment findings. Orders listed below and wound care will continue to follow, call or tiger text with questions. Skin Care Plan:  1-Cleanse sacro-buttocks with soap and water. Apply Calazime to B/L Sacro-Buttocks TID and PRN episodes of incontinence. 2-Turn/reposition q2h or when medically stable for pressure re-distribution on skin . 3-Elevate heels to offload pressure.   4-Moisturize skin daily with skin nourishing cream  5-Ehob cushion in chair when out of bed. 6-Preventative Hydraguard to bilateral heels BID and PRN. 7-[facility] Specialty Mattress Ordered for Patient. 8-Cleanse right leg with normal saline, apply silver alginate to wound bed, cover with ABD, secure with juanjo and tape. Change daily and PRN soilage/displacement. 9-Cleanse B/L arm skin tears with soap and water. Apply Allevyn foam, angelo T for treatment, and change every 3 days and PRN soilage/displacement  10-Right Thigh/Abdomen/Pannus fold: Cleanse with and water, pat dry. Apply maxorb rope to area. Change daily or PRN soilage or displacement. Continue to follow-up with outpatient wound center for continued treatment of right lower leg wound. Wounds:  Wound 01/13/23 Ankle Right;Medial (Active)   Wound Image   06/27/23 1050   Wound Description Yellow;Slough; Beefy red;Granulation tissue 06/27/23 1058   Deedee-wound Assessment Fragile; Hyperpigmented;Scaly 06/27/23 1058   Wound Length (cm) 10 cm 06/27/23 1050   Wound Width (cm) 2 cm 06/27/23 1050   Wound Depth (cm) 0.3 cm 06/27/23 1050   Wound Surface Area (cm^2) 20 cm^2 06/27/23 1050   Wound Volume (cm^3) 6 cm^3 06/27/23 1050   Calculated Wound Volume (cm^3) 6 cm^3 06/27/23 1050   Change in Wound Size % -175.23 06/27/23 1050   Tunneling 0 cm 06/27/23 1058   Tunneling in depth located at 0 06/27/23 1058   Undermining 0 06/27/23 1058   Undermining is depth extending from 0 06/27/23 1058   Wound Site Closure JIM 06/27/23 1058   Drainage Amount Small 06/27/23 1058   Drainage Description Serosanguineous 06/27/23 1058   Non-staged Wound Description Full thickness 06/27/23 1058   Treatments Cleansed;Irrigation with NSS;Site care 06/27/23 1058   Dressing Non adherent;Calcium Alginate with Silver;ABD;Dry dressing 06/27/23 1058   Wound packed?  No 06/27/23 1058   Packing- # removed 0 06/27/23 1058   Packing- # inserted 0 06/27/23 1058   Dressing Changed Changed 06/27/23 1058   Patient Tolerance Tolerated well 06/27/23 1058   Dressing Status Clean;Dry; Intact 06/27/23 1058       Wound 06/15/23 Pressure Injury Buttocks Right (Active)   Wound Image    06/27/23 1058   Wound Description Pink 06/27/23 1058   Pressure Injury Stage O 06/27/23 1058   Deedee-wound Assessment Intact 06/27/23 1058   Wound Length (cm) 0.4 cm 06/22/23 1033   Wound Width (cm) 0.2 cm 06/22/23 1033   Wound Depth (cm) 0.1 cm 06/22/23 1033   Wound Surface Area (cm^2) 0.08 cm^2 06/22/23 1033   Wound Volume (cm^3) 0.008 cm^3 06/22/23 1033   Calculated Wound Volume (cm^3) 0.01 cm^3 06/22/23 1033   Drainage Amount None 06/27/23 1058   Non-staged Wound Description Partial thickness 06/27/23 1058   Treatments Cleansed;Site care 06/27/23 1058   Dressing Protective barrier 06/27/23 1058   Dressing Changed New 06/27/23 1058   Patient Tolerance Tolerated well 06/27/23 1058   Dressing Status Intact 06/27/23 1058       Wound 06/17/23 Skin Tear Arm Left (Active)   Wound Image   06/27/23 1053   Wound Description Bleeding; Beefy red 06/27/23 1058   Deedee-wound Assessment Fragile 06/27/23 1058   Wound Length (cm) 1.3 cm 06/27/23 1053   Wound Width (cm) 1 cm 06/27/23 1053   Wound Depth (cm) 0.1 cm 06/27/23 1053   Wound Surface Area (cm^2) 1.3 cm^2 06/27/23 1053   Wound Volume (cm^3) 0.13 cm^3 06/27/23 1053   Calculated Wound Volume (cm^3) 0.13 cm^3 06/27/23 1053   Change in Wound Size % 18.75 06/27/23 1053   Tunneling 0 cm 06/27/23 1058   Tunneling in depth located at 0 06/27/23 1058   Undermining 0 06/27/23 1058   Undermining is depth extending from 0 06/27/23 1058   Wound Site Closure JIM 06/27/23 1058   Drainage Amount Scant 06/27/23 1058   Drainage Description Sanguineous 06/27/23 1058   Non-staged Wound Description Partial thickness 06/27/23 1058   Treatments Cleansed;Irrigation with NSS;Site care 06/27/23 1058   Dressing Foam, Silicon (eg. Allevyn, etc) 06/27/23 1058   Wound packed?  No 06/27/23 1058   Packing- # removed 0 06/27/23 1058   Packing- # inserted 0 06/27/23 1058   Dressing Changed Changed 06/27/23 1058   Patient Tolerance Tolerated well 06/27/23 1058   Dressing Status Intact;Dry;Clean 06/27/23 1058       Wound 06/17/23 Skin Tear Arm Anterior;Right (Active)   Wound Image   06/27/23 1052   Wound Description Beefy red;Bleeding 06/27/23 1058   Deedee-wound Assessment Intact 06/27/23 1058   Wound Length (cm) 0.8 cm 06/27/23 1052   Wound Width (cm) 0.3 cm 06/27/23 1052   Wound Depth (cm) 0.1 cm 06/27/23 1052   Wound Surface Area (cm^2) 0.24 cm^2 06/27/23 1052   Wound Volume (cm^3) 0.024 cm^3 06/27/23 1052   Calculated Wound Volume (cm^3) 0.02 cm^3 06/27/23 1052   Change in Wound Size % 75 06/27/23 1052   Tunneling 0 cm 06/27/23 1058   Tunneling in depth located at 0 06/27/23 1058   Undermining 0 06/27/23 1058   Undermining is depth extending from 0 06/27/23 1058   Drainage Amount Scant 06/27/23 1058   Drainage Description Sanguineous 06/27/23 1058   Non-staged Wound Description Partial thickness 06/27/23 1058   Treatments Cleansed;Irrigation with NSS;Site care 06/27/23 1058   Dressing Foam, Silicon (eg. Allevyn, etc) 06/27/23 1058   Wound packed? No 06/27/23 1058   Packing- # removed 0 06/27/23 1058   Packing- # inserted 0 06/27/23 1058   Dressing Changed Changed 06/27/23 1058   Patient Tolerance Tolerated well 06/27/23 1058   Dressing Status Intact;Dry;Clean 06/27/23 1058       Wound 06/27/23 Thigh Anterior;Proximal;Right (Active)   Wound Image   06/27/23 1057   Wound Description Bleeding; Beefy red;Yellow 06/27/23 1058   Deedee-wound Assessment Fragile 06/27/23 1058   Wound Length (cm) 6 cm 06/27/23 1057   Wound Width (cm) 11 cm 06/27/23 1057   Wound Depth (cm) 0.1 cm 06/27/23 1057   Wound Surface Area (cm^2) 66 cm^2 06/27/23 1057   Wound Volume (cm^3) 6.6 cm^3 06/27/23 1057   Calculated Wound Volume (cm^3) 6.6 cm^3 06/27/23 1057   Drainage Amount Scant 06/27/23 1058   Drainage Description Sanguineous 06/27/23 1058   Non-staged Wound Description Partial thickness 06/27/23 1058   Treatments Cleansed;Irrigation with NSS;Site care 06/27/23 1058   Dressing Calcium Alginate 06/27/23 1058   Dressing Changed New 06/27/23 1058   Patient Tolerance Tolerated well 06/27/23 1058   Dressing Status Intact 06/27/23 1400 \[facility] [place] [facility]\"", [employer], [employer]

## 2023-06-28 PROBLEM — J96.00 ACUTE RESPIRATORY FAILURE (HCC): Status: ACTIVE | Noted: 2023-01-01

## 2023-06-28 NOTE — ASSESSMENT & PLAN NOTE
Home regimen of Sotalol and warfarin, both held on admission  · Continue to hold Coumadin  · Restarted on sotalol

## 2023-06-28 NOTE — ASSESSMENT & PLAN NOTE
Assessment:   · Multifactorial from volume overload, hypoxic vasoconstriction in the setting of pulmonary hypertension, aspiration due to dysphagia, and less likely pneumonia, although very high risk    Plan:   · Wean supplemental oxygen as able to maintain SpO2 > 90%   · Continue high flow nasal cannula   · Xopenex TID   · Chest PT  · Follow up CXR in AM  · Continue pulmonary hygiene. Incentive spirometer q1h while awake, encourage coughing and deep breathing. Upright positioning  · Suction as needed and closely monitor secretions. Maintain HOB >30 degrees.  Q4h oral care with chlorhexidine BID

## 2023-06-28 NOTE — RESPIRATORY THERAPY NOTE
RT Protocol Note  Shamir Candelaria 81 y.o. male MRN: [id number]  Unit/Bed#: [facility] 504-01 Encounter: [id number]    Assessment    Principal Problem:    REMA (acute kidney injury) ([address])  Active Problems:    Diabetes ([address])    Acute on chronic anemia    Atrial fibrillation (HCC)    History of ventricular tachycardia    CHF (congestive heart failure) (HCC)    Prolonged QT interval    Thrombocytopenia (HCC)    Septic shock (HCC)    Dysphagia    Acute low back pain    Fever      Home Pulmonary Medications:     NA    Past Medical History:   Diagnosis Date    Anemia     Arthritis     Atrial fibrillation ([address])     Basal cell carcinoma 03/22/2022    Tip of Nose    CHF (congestive heart failure) (HCC)     Diabetes mellitus ([address])     Niddm    DVT (deep vein thrombosis) in pregnancy 1966    not in pregnancy    DVT (deep venous thrombosis) ([address]) 1966    Dyslipidemia     Encephalopathy acute 11/4/2022    GERD (gastroesophageal reflux disease)     Hyperlipidemia     Hypertension     Hypomagnesemia 10/19/2022    Irregular heart beat     Afib    Morbid obesity due to excess calories ([address])     Resolved 9/2/2014     Pulmonary embolism (HCC)     Sepsis (HCC)     Squamous cell skin cancer 07/30/2020    Left posterior scalp    Visual impairment      Social History     Socioeconomic History    Marital status: /Civil Union     Spouse name: None    Number of children: None    Years of education: None    Highest education level: None   Occupational History    None   Tobacco Use    Smoking status: Never    Smokeless tobacco: Never   Vaping Use    Vaping Use: Never used   Substance and Sexual Activity    Alcohol use: Not Currently     Comment: no alcohol in 28 yrs    Drug use: Never    Sexual activity: Yes     Partners: Female     Birth control/protection: None   Other Topics Concern    None   Social History Narrative    None     Social Determinants of Health     Financial Resource Strain: Not on file   Food Insecurity: No Food Insecurity (6/15/2023)    Hunger Vital Sign     Worried About Running Out of Food in the Last Year: Never true     [address] in the Last Year: Never true   Transportation Needs: No Transportation Needs (6/15/2023)    PRAPARE - Transportation     Lack of Transportation (Medical): No     Lack of Transportation (Non-Medical): No   Physical Activity: Not on file   Stress: Not on file   Social Connections: Not on file   Intimate Partner Violence: Not on file   Housing Stability: Low Risk  (6/15/2023)    Housing Stability Vital Sign     Unable to Pay for Housing in the Last Year: No     Number of Places Lived in the Last Year: 1     Unstable Housing in the Last Year: No       Subjective         Objective    Physical Exam:   Assessment Type: Assess only  General Appearance: Sleeping  Respiratory Pattern: Dyspnea at rest  Chest Assessment: Chest expansion symmetrical  Bilateral Breath Sounds: Rhonchi    Vitals:  Blood pressure 121/66, pulse 98, temperature 98.3 °F (36.8 °C), temperature source Oral, resp. rate 19, height 6' 1" (1.854 m), weight 135 kg (297 lb 3.2 oz), SpO2 (!) 89 %. Imaging and other studies:           Plan    Respiratory Plan: Discontinue Protocol  Airway Clearance Plan: Percussive Vest     Resp Comments: (P) After further investigation into pts current status, pt was found to have an acute L1 spinal fx. Will discontinue VEST therapy at this time. Dr. Jennifer Alves aware.

## 2023-06-28 NOTE — ASSESSMENT & PLAN NOTE
Patient with chronic macrocytic anemia baseline 7-8. · Required 2U PRBC this admission  · EGD completed: Single small, superficial ulcer in the 1st part of the duodenum with clean base   · Continue PPI  · Watch for s/s of GI bleed  · Resume DVT ppx with heparin and trend hgb.    · [facility] with [facility] for  PEG placement today  · Anemia panel reviewed

## 2023-06-28 NOTE — ASSESSMENT & PLAN NOTE
Patient had VBS with speech   · Unfortunately he is aspiration with both solid and liquid. · Speech recommends PEG tube. · GI recommends trial of NG tube first prior to committing to PEG tube. · Started NG tube feeding 6/22, currently at goal, tolerating.    · Repeat VBS 6/26 with worsening aspiration,  · Plan for PEG tube placement today by GI

## 2023-06-28 NOTE — CONSULTS
[address] [place] [address]  Consult: Critical Care  Name: Kemar Summers 81 y.o. male I MRN: [id number]  Unit/Bed#: [facility] 515-01 I Date of Admission: 6/14/2023   Date of Service: 6/28/2023 I Hospital Day: 15      Consults  Assessment/Plan   This is a 88M  Hx of Anemia, Arthritis, AF, Basal cell carcinoma, HFpEF, History of VT, CKD (b/l 1.2-1.5 range), Thrombocytopenia, multiple lumbar compression fractures, T2DM, GERD, HTN, HLD, Obesity, dysphagia (scheduled for PEG tube on 6/28/2023), Sepsis, who initially presented to [facility] for MDR E coli urosepsis on 6/14/2023 requiring brief ICU admission due to septic shock, acute on chronic renal failure. He now presents as a rapid response for increased WOB and inability to manage oral secretions. Critical care interventions I performed:  - Stabilization of the airway by removal of copious oral secretions  - Transition to HFNC  - Initiation of high-dose diuretic regimen to manage acute respiratory decompensation  - Broadening and adjustment of Abx given clinical decompensation  - Transfer to the ICU for further management      Neuro:  # Encephalopathy: Likely Toxic/Metabolic 2/2 Cardiopulmonary disease, Infectious process, illness-related delirium  - Delirium precautions  - Otherwise mgmt as per below     CV:  # HFpEF  - Notable peripheral edema in b/l lower extremities and CXR c/f volume overload  - Aggressive diuresis w/ bumex infusion for goal 2L negative to support respiratory status  # HTN: Sotolol  # AF: Sotolol. No therapeutic AC      Pulm:  # Acute Respiratory Failure with copious secretions and increased WOB: requiring non-invasive ventilation.  with HFNC  - Aggressive pulm toilet: albuterol, 3% nebs  - Chest PT with HOB <45 degrees given lumbar compression fractures  - Diuresis as above  # Suspected PNA w/ history of MDRO  - See plan below under SIRS    GI/Nutrition:  # Dysphagia: NPO  - Plan for PEG tube today; will reengage service as needed    Renal/:  # Acute on Chronic Kidney Injury (b/l Cr 1.2-1.5): Likely Cardiorenal and ATN -related disease  - Acute need for diuresis to preserve respiratory function  - Will initiate aggressive diuresis for goal negative 2 liters. Endo/Metabolic:  # S9HD: lantus + SSI    Heme/ID:  # Sepsis/SIRS physiology: PNA suspected in RLL  - Note slowly increasing temperatures from 6/24/2023 with possible slight increase in WBC.  - Does seem to responding CTX but will broaden to Cefepime given high MDRO risks  - w/u: Cultures (Blood: NGTD, Urine: E coli and Klebsiella, Sputum: mixed radha), CXR: RLL PNA suspected, MRSA: neg, Procalcitonin: pending.  - Rx: Antibiotics (CTX started on 6/24; will complete 7 day course with cefepime)    MSK/Skin/Prophylaxis:  # Lumbar compression fractures:  - Per NSG plan for TLSO if OOB or HOB >45 degrees  # PPX: Statin, SQH, Pantoprazole, Lines/Devices in place (Combs). # Devices to remove?: no  # Dispo/GOC: Ongoing GOC discussionw [facility] family      Disposition: Critical care     History of Present Illness     HPI: Mora Perez is a 81 y.o. x of Anemia, Arthritis, AF, Basal cell carcinoma, HFpEF, History of VT, CKD (b/l 1.2-1.5 range), Thrombocytopenia, multiple lumbar compression fractures, T2DM, GERD, HTN, HLD, Obesity, dysphagia (scheduled for PEG tube on 6/28/2023), Sepsis, who initially presented to [facility] for MDR E coli urosepsis on 6/14/2023 requiring brief ICU admission due to septic shock, acute on chronic renal failure. He now presents as a rapid response for increased WOB and inability to manage oral secretions. Per staff, patient has had progressively increased difficulty speaking and managing his secretions over the last several days. However, at this time he has had an acute increase in WOB. He is unable to have chest PT due to lumbar spine compression fractures and intolerance of TLSO bracing.     History obtained from chart review, nursing staff, and unobtainable from patient due to difficulty speaking. Review of Systems   Constitutional: Positive for activity change and fatigue. HENT: Positive for congestion, drooling, trouble swallowing and voice change. Respiratory: Positive for shortness of breath and stridor. Cardiovascular: Positive for leg swelling. Endocrine: Negative. Musculoskeletal: Positive for back pain. Neurological: Positive for speech difficulty. Psychiatric/Behavioral: Negative. Historical Information   Past Medical History:  No date: Anemia  No date: Arthritis  No date: Atrial fibrillation ([address])  03/22/2022: Basal cell carcinoma      Comment:  Tip of Nose  No date: CHF (congestive heart failure) (HCC)  No date: Diabetes mellitus ([address])      Comment:  Niddm  1966: DVT (deep vein thrombosis) in pregnancy      Comment:  not in pregnancy  1966: DVT (deep venous thrombosis) ([address])  No date: Dyslipidemia  11/4/2022: Encephalopathy acute  No date: GERD (gastroesophageal reflux disease)  No date: Hyperlipidemia  No date: Hypertension  10/19/2022: Hypomagnesemia  No date: Irregular heart beat      Comment:  Afib  No date:  Morbid obesity due to excess calories ([address])      Comment:  Resolved 9/2/2014   No date: Pulmonary embolism ([address])  No date: Sepsis ([address])  07/30/2020: Squamous cell skin cancer      Comment:  Left posterior scalp  No date: Visual impairment Past Surgical History:  No date: AORTIC VALVE REPLACEMENT  04/2014: CARDIAC DEFIBRILLATOR PLACEMENT  02/2014: CARDIAC SURGERY      Comment:  AVR  No date: COLONOSCOPY  No date: INSERT / REPLACE / REMOVE PACEMAKER  3/8/2023: IR ASPIRATION BAKERS CYST  10/13/2022: IR CHOLECYSTOSTOMY TUBE CHECK/CHANGE/REPOSITION/  REINSERTION/UPSIZE  10/19/2022: IR CHOLECYSTOSTOMY TUBE CHECK/CHANGE/REPOSITION/  REINSERTION/UPSIZE  10/27/2022: IR CHOLECYSTOSTOMY TUBE CHECK/CHANGE/REPOSITION/  REINSERTION/UPSIZE  11/7/2022: IR CHOLECYSTOSTOMY TUBE CHECK/CHANGE/REPOSITION/  REINSERTION/UPSIZE  11/10/2022: IR CHOLECYSTOSTOMY TUBE CHECK/CHANGE/REPOSITION/  REINSERTION/UPSIZE  12/1/2022: IR CHOLECYSTOSTOMY TUBE CHECK/CHANGE/REPOSITION/  REINSERTION/UPSIZE  1/6/2023: IR CHOLECYSTOSTOMY TUBE CHECK/CHANGE/REPOSITION/REINSERTION/  UPSIZE  1/24/2023: IR CHOLECYSTOSTOMY TUBE CHECK/CHANGE/REPOSITION/  REINSERTION/UPSIZE  3/15/2023: IR CHOLECYSTOSTOMY TUBE CHECK/CHANGE/REPOSITION/  REINSERTION/UPSIZE  9/1/2022: IR CHOLECYSTOSTOMY TUBE PLACEMENT  3/22/2023: IR DRAINAGE TUBE CHECK AND/OR REMOVAL  4/10/2023: IR DRAINAGE TUBE CHECK AND/OR REMOVAL  4/6/2023: IR DRAINAGE TUBE PLACEMENT  6/19/2023: IR TUNNELED DIALYSIS CATHETER PLACEMENT  No date: JOINT REPLACEMENT; Left      Comment:  LTKR  07/30/2020: MOHS SURGERY      Comment:  Left posterior scalp, Dr. Erna Barcenas  04/18/2022: MOHS SURGERY      Comment:  BCC Tip of Nose- Dr. Erna Barcenas  8/17/2018: Erinn MULLER&STRIP LONG SAPH SAPHFEM Eliseo Cranker; Right      Comment:  Procedure: LEG PERFORATED INJECTION SCLEROTHERAPY;                 Surgeon: Catrachito Garcia MD;  Location: [facility];                 Service: Vascular  No date: REPLACEMENT TOTAL KNEE; Right  No date: TOTAL KNEE ARTHROPLASTY;  Left  No date: VASCULAR SURGERY  No date: VENA CAVA FILTER PLACEMENT      Comment:  Interruption inferior vena cava, Josue filter,                placement  No date: WISDOM TOOTH EXTRACTION   Current Outpatient Medications   Medication Instructions   • Acetaminophen (TYLENOL EXTRA STRENGTH PO) 500 mg, Oral, As needed   • atorvastatin (LIPITOR) 40 mg, Oral, Daily after dinner, Atorvastatin Calcium 40 MG Oral Tablet Take 1 tablet daily  Refills: 0  Active   • B Complex-C (SUPER B COMPLEX PO) 1 capsule, Oral, Daily   • Cholecalciferol 3,000 Units, Oral, Daily   • Diclofenac Sodium (VOLTAREN) 2 g, Topical, 4 times daily   • fluticasone (FLONASE) 50 mcg/act nasal spray 2 sprays, Nasal, Daily PRN, Shake liquid and spray    • gabapentin (NEURONTIN) 100 mg, Oral, Daily   • Iron Combinations (NIFEREX) TABS 1 tablet, Oral, Daily   • lubiprostone (AMITIZA) 24 mcg, Oral, 2 times daily with meals   • metFORMIN (GLUCOPHAGE) 1,000 mg, Oral, Daily with dinner   • Multiple Vitamin (MULTIVITAMINS PO) 1 tablet, Oral, Daily   • omeprazole (PriLOSEC) 20 mg delayed release capsule Omeprazole 20 MG Oral Tablet Delayed Release Take 1 tablet daily  Refills: 0  Active   • polyethylene glycol (GLYCOLAX) 17 g, Oral, 2 times daily   • Potassium Chloride ER 20 MEQ TBCR 20 mEq, Oral, Daily   • senna-docusate sodium (SENOKOT S) 8.6-50 mg per tablet 1 tablet, Oral, Daily at bedtime   • sodium hypochlorite (DAKIN'S HALF-STRENGTH) external solution 1 application. , Topical, Daily, At each dressing change   • sotalol (BETAPACE) 80 mg, Oral, Daily   • Torsemide 40 mg, Oral, 2 times daily   • warfarin (COUMADIN) 4 mg, Oral, Daily (warfarin), Acknowledge    Allergies   Allergen Reactions   • Tramadol Other (See Comments)     intolerance      Social History     Tobacco Use   • Smoking status: Never   • Smokeless tobacco: Never   Vaping Use   • Vaping Use: Never used   Substance Use Topics   • Alcohol use: Not Currently     Comment: no alcohol in 28 yrs   • Drug use: Never    Family History   Problem Relation Age of Onset   • Arthritis Mother    • Stroke Mother    • Arthritis Father    • Cancer Father    • Arthritis Daughter           Objective                            Vitals I/O      Most Recent Min/Max in 24hrs   Temp 98.4 °F (36.9 °C) Temp  Min: 97.5 °F (36.4 °C)  Max: 98.7 °F (37.1 °C)   Pulse 91 Pulse  Min: 86  Max: 115   Resp (!) 26 Resp  Min: 17  Max: 26   /75 BP  Min: 112/75  Max: 121/66   O2 Sat 98 % SpO2  Min: 89 %  Max: 98 %      Intake/Output Summary (Last 24 hours) at 6/28/2023 1755  Last data filed at 6/28/2023 1501  Gross per 24 hour   Intake 670 ml   Output 1285 ml   Net -615 ml         Diet Enteral/Parenteral; Tube Feeding No Oral Diet; Jevity 1.5; Continuous; 65; 50; Water; Every 4 hours;  Ice chips     Invasive Monitoring Physical exam   none Physical Exam  General: Elderly man sitting in bed, ill appearing and speaking in very short phrases. Audible gurgling during respiratory care. Neurologic: GCS: Eyes 4- spontaneous, Voice 2- incomprehensible, Motor 6- to commands  HEENT: Normocephalic and atraumatic. Sclera are anicteric. Mucous membranes are moist.  Lungs & Thorax: Work of breathing shows labored breathing on nasal canula. Saturating in the 95% range but with increased accessory muscle use . Cardiovascular: The JVP is grossly unable to be assessed. Lower extremities show 2+ edema. Cap refill is <2 sec. Abdomen: Obese but not tender. Extremities / MSK: Musculature is grossly diminished. Skin: The skin is warm and dry.            Diagnostic Studies    EKG: no new acute changes; no STEMI  Imaging: CXR c/f RLL consolidation and pulmonary congestion I have personally reviewed pertinent films in PACS     Medications:  Scheduled PRN   acetaminophen, 650 mg, Q6H  bisacodyl, 10 mg, Daily  cefepime, 1,000 mg, Q12H  furosemide, 40 mg, Daily  gabapentin, 300 mg, HS  heparin (porcine), 5,000 Units, Q8H AMANDA  insulin glargine, 10 Units, HS  insulin lispro, 2-12 Units, Q6H  levalbuterol, ,   levalbuterol, 1.25 mg, TID  lidocaine, 1 patch, Daily  melatonin, 6 mg, HS  nystatin, , BID  pantoprazole, 40 mg, Q24H AMANDA  sodium chloride, 4 mL, TID  sotalol, 80 mg, Daily      albuterol, 2.5 mg, Q4H PRN  diphenhydramine, lidocaine, Al/Mg hydroxide, simethicone, 10 mL, Q6H PRN  HYDROmorphone, 0.5 mg, Q4H PRN  HYDROmorphone, 0.2 mg, Q2H PRN   Or  HYDROmorphone, 0.5 mg, Q2H PRN  levalbuterol, ,   lidocaine, , 4x Daily PRN  naloxone, 0.04 mg, Q1MIN PRN  oxyCODONE, 5 mg, Q4H PRN   Or  oxyCODONE, 7.5 mg, Q4H PRN  trimethobenzamide, 200 mg, Q6H PRN       Continuous    bumetanide (BUMEX) 12.5 mg infusion 50 mL, 1 mg/hr         Labs:  CBC    Recent Labs     06/27/23  0522   WBC 10.08   HGB 7.6*   HCT 26.0*        BMP    Recent Labs 06/27/23  0522 06/28/23  0512   SODIUM 143 141   K 4.5 4.8   * 111*   CO2 30 30   AGAP 3 0   BUN 34* 36*   CREATININE 1.56* 1.53*   CALCIUM 8.4 8.5       Coags    Recent Labs     06/28/23  0512   INR 1.17        Additional Electrolytes  Recent Labs     06/28/23  0512   MG 1.7          Blood Gas    No recent results  No recent results LFTs  No recent results    Infectious  No recent results  Glucose  Recent Labs     06/27/23 0522 06/28/23  0512   GLUC 151* 131              Critical Care Time Delivered: Upon my evaluation, this patient had a high probability of imminent or life-threatening deterioration due to acute respiratory distress requring non-invasive ventilation with HFNC, which required my direct attention, intervention, and personal management. I have personally provided 40 minutes of critical care time, exclusive of procedures, teaching, family meetings, and any prior time recorded by providers other than myself.      Ori Peacock MD PhD

## 2023-06-28 NOTE — RAPID RESPONSE
Rapid Response Note  Manju Steele 81 y.o. male MRN: [id number]  Unit/Bed#: [facility] 515-01 Encounter: [id number]    Rapid Response Notification(s):   Response called date/time:  6/28/2023 5:03 PM  Response team arrival date/time:  6/28/2023 5:05 PM  Response end date/time:  6/28/2023 5:10 PM  Level of care:  [facility]  Rapid response location:  [facility] unit  Primary reason for rapid response call:  Acute change in RR    Rapid Response Intervention(s):   Airway:  Positioning and suction  Breathing:  Oxygen  Circulation:  None  Fluids administered:  None  Medications administered:  None       Assessment:   · Respiratory distress  · Acute hypoxic respiratory failure       Plan:   · NT suctioning   · Aggressive pulmonary toileting   · Vest therapy   · Upgrade to CC   · Start HFNC   · Would not use BIPAP if respiratory status worsens given secretions, would require intubation   · CXR done 2 hrs prior reviewed     Rapid Response Outcome:   Transfer:  Transfer to ICU  Primary service notified of transfer: Yes      Family notified of transfer: yes  Family member contacted: Wife and daughters discussed bedside      Background/Situation:   Manju Steele is a 81 y.o. male admitted with septic shock with MDR E. Coli urosepsis with brief ICU admission for vasopressor requirements, REMA requiring CRRT and iHD now off, acute L1/L2 fracture, dCHF, aspiration pending PEG tube placement, AF with RR called for increased WOB, secretions unable to clear and O2 requirements unrelieved by frequent suctioning throughout day today. Patient NT suctioned again without improvement. CXR completed 2 hrs prior reviewed. With significant WOB and secretions restricting BIPAP use upgrade to ICU for aggressive airway clearance and possible need for intubation. Review of Systems   Constitutional: Positive for diaphoresis and fatigue. Respiratory: Positive for cough and shortness of breath.         Objective:   Vitals:    06/28/23 1702 06/28/23 1706 06/28/23 1708 06/28/23 1709   BP:  115/66  112/75   BP Location:       Pulse: (!) 115  91    Resp:   (!) 26    Temp:       TempSrc:       SpO2: 94%  98%    Weight:       Height:         Physical Exam  Vitals reviewed. Constitutional:       General: He is in acute distress (respiratory distress). Appearance: He is obese. He is ill-appearing and diaphoretic. Interventions: Nasal cannula in place. HENT:      Head: Normocephalic and atraumatic. Eyes:      Pupils: Pupils are equal, round, and reactive to light. Cardiovascular:      Rate and Rhythm: Normal rate and regular rhythm. Pulmonary:      Effort: Tachypnea, accessory muscle usage, respiratory distress and retractions (supraclavicular retractions ) present. Comments: Gurgling upper airway secretions, thick and yellow with suctioning   Coarse breath sounds throughout   Abdominal:      General: Abdomen is protuberant. Skin:     General: Skin is warm and moist.      Comments: BLLE edema +4 with overlying chronic skin changes    Neurological:      Mental Status: He is alert. Portions of the record may have been created with voice recognition software. Occasional wrong word or "sound a like" substitutions may have occurred due to the inherent limitations of voice recognition software. Read the chart carefully and recognize, using context, where substitutions have occurred.     Melany Waggoner PA-C

## 2023-06-28 NOTE — PROGRESS NOTES
Progress Note - Nephrology   Shauna Beatty 81 y.o. male MRN: [id number]  Unit/Bed#: [facility] 504-01 Encounter: [id number]  ASSESSMENT and PLAN:  Acute kidney injury  Etiology: Suspect septic ATN, possible diuretic use and contrast nephropathy  • Baseline creatinine appears to be 1.6-2.0 back to 5/2022, previously 1.3-1.6  • Initiated on emergent IHD on 6/14/2023 for hyperkalemia  • Transition to CRRT and discontinued 6/19/2023  • Last dialysis HD was on 6/23/2023  • With renal recovery good urinary output urinary output -1.7 L/24 hours  • Creatinine 1.53 remained stable  • IR consulted-plan to remove PermCath this morning  • Continue to monitor BMP at this time     Access:  • Tunneled catheter placed via IR 6/19/2023-site intact, clean and dry-plan for removal today     Chronic kidney disease IIIB:    Etiology:  Suspect secondary to diabetic kidney disease, pretension nephrosclerosis, prior REMA episode as well as age-related nephron loss  • Baseline Creatinine of 1.6-2.0 dating back to 2022 the setting of prior REMA/ATN/LORENA  • Previous baseline 1.3-1.6  • Previously seen with prior admission but no renal follow-up  • Recommend follow-up on discharge     Septic shock with MDR E coli urosepsis  • Status post antibiotics with ceftriaxone and pressor  • With recurrent fevers-and ceftriaxone resumed  • His disease following     Anemia: Concern for acute blood loss possible GI bleed  • Management per primary team  • Hemoglobin 7.6  • s/p post transfusion on 6/17/2023     Lactic acidosis-resolved  · Concern for metformin toxicity  · Repeat lactic acid 1.5    Diastolic CHF/lymphedema/third spacing in the setting of hypoalbuminemia  · Chest x-ray from 6/24/2023 revealed mild pulmonary edema with small effusions cannot exclude  · Has moist nonproductive cough-difficulty bringing up sputum per RN  · Weight has decreased 135 kg (141)  · On daily Lasix IV  · Consider transitioning to oral diuretics      Medical records through [facility] and care everywhere has been reviewed for this encounter    Review of systems  Patient seen and examined at bedside: Patient reports intermittent shortness of breath cough unable to bring up sputum  Review of Systems   Constitutional: Positive for activity change and fatigue. HENT: Negative. Eyes: Negative. Respiratory: Positive for cough and shortness of breath. Cardiovascular: Positive for leg swelling. Gastrointestinal: Negative. Endocrine: Negative. Genitourinary: Negative. Musculoskeletal: Negative. Skin: Negative. Neurological: Positive for weakness. Hematological: Negative. Psychiatric/Behavioral: Negative. Physical exam:  Current Weight: Weight - Scale: 135 kg (297 lb 3.2 oz)  Vitals:    06/27/23 1600 06/27/23 2322 06/28/23 0546 06/28/23 0726   BP:  119/55  121/62   BP Location:       Pulse:  102  104   Resp:  17     Temp:  97.5 °F (36.4 °C)  98.7 °F (37.1 °C)   TempSrc:       SpO2: 95% 96%  92%   Weight:   135 kg (297 lb 3.2 oz)    Height:           Intake/Output Summary (Last 24 hours) at 6/28/2023 0928  Last data filed at 6/28/2023 0541  Gross per 24 hour   Intake 1599 ml   Output 1300 ml   Net 299 ml       Physical Exam  Vitals reviewed. Constitutional:       Appearance: He is ill-appearing. Comments: nad   HENT:      Head: Normocephalic and atraumatic. Nose: Nose normal.      Mouth/Throat:      Mouth: Mucous membranes are moist.      Pharynx: Oropharynx is clear. Eyes:      Extraocular Movements: Extraocular movements intact. Conjunctiva/sclera: Conjunctivae normal.   Neck:      Comments: Right PermCath/site intact  Cardiovascular:      Rate and Rhythm: Regular rhythm. Tachycardia present. Pulses: Normal pulses. Heart sounds: Normal heart sounds. Pulmonary:      Effort: Pulmonary effort is normal.      Breath sounds: Rhonchi present. Abdominal:      General: Bowel sounds are normal.      Palpations: Abdomen is soft. Musculoskeletal:      Cervical back: Normal range of motion and neck supple. Right lower leg: Edema present. Left lower leg: Edema present. Skin:     General: Skin is warm and dry. Neurological:      General: No focal deficit present. Mental Status: He is alert and oriented to person, place, and time.    Psychiatric:         Mood and Affect: Mood normal.         Behavior: Behavior normal.            Medications:    Current Facility-Administered Medications:   •  acetaminophen (TYLENOL) oral suspension 650 mg, 650 mg, Per NG Tube, Q6H, Madelyn Payne DO, 650 mg at 06/28/23 0555  •  bisacodyl (DULCOLAX) rectal suppository 10 mg, 10 mg, Rectal, Daily, Emeterio Cifuentes MD, 10 mg at 06/27/23 0843  •  cefTRIAXone (ROCEPHIN) 1,000 mg in dextrose 5 % 50 mL IVPB, 1,000 mg, Intravenous, Q24H, Lashae Paredes DO, Stopped at 06/27/23 2130  •  diphenhydramine, lidocaine, Al/Mg hydroxide, simethicone (Magic Mouthwash) oral solution 10 mL, 10 mL, Swish & Spit, Q6H PRN, Lanre Bautista MD  •  furosemide (LASIX) injection 40 mg, 40 mg, Intravenous, Daily, Luis Felipe Hernández MD, 40 mg at 06/28/23 0818  •  gabapentin (NEURONTIN) capsule 300 mg, 300 mg, Oral, HS, April ODESSA Hinton, 300 mg at 06/27/23 2101  •  heparin (porcine) subcutaneous injection 5,000 Units, 5,000 Units, Subcutaneous, Q8H [facility], Emeterio Cifuentes MD, 5,000 Units at 06/28/23 0560  •  HYDROmorphone (DILAUDID) injection 0.5 mg, 0.5 mg, Intravenous, Q4H PRN, April ODESSA iHnton, 0.5 mg at 06/27/23 2102  •  insulin glargine (LANTUS) subcutaneous injection 10 Units 0.1 mL, 10 Units, Subcutaneous, HS, Hans Schroeder DO, 10 Units at 06/27/23 2101  •  insulin lispro (HumaLOG) 100 units/mL subcutaneous injection 2-12 Units, 2-12 Units, Subcutaneous, Q6H, 2 Units at 06/27/23 1806 **AND** Fingerstick Glucose (POCT), , , Q6H, Mendel Dellen, CRNP  •  lidocaine (LIDODERM) 5 % patch 1 patch, 1 patch, Topical, Daily, Lanre Bautista MD, 1 patch at 06/28/23 0818  •  lidocaine (LMX) 4 % cream, , Topical, 4x Daily PRN, Bernice Bautista MD, Given at 06/18/23 1640  •  melatonin tablet 6 mg, 6 mg, Oral, HS, ODESSA Rush, 6 mg at 06/27/23 2101  •  naloxone (NARCAN) 0.04 mg/mL syringe 0.04 mg, 0.04 mg, Intravenous, Q1MIN PRN, Kaden Felipe DO  •  nystatin (MYCOSTATIN) powder, , Topical, BID, Lashae Paredes DO, Given at 06/28/23 5618  •  oxyCODONE (ROXICODONE) oral solution 5 mg, 5 mg, Per NG Tube, Q4H PRN **OR** oxyCODONE (ROXICODONE) oral solution 7.5 mg, 7.5 mg, Oral, Q4H PRN, ODESSA Sanchez, 7.5 mg at 06/27/23 1806  •  pantoprazole (PROTONIX) injection 40 mg, 40 mg, Intravenous, Q24H [facility], El Duarte MD, 40 mg at 06/28/23 0818  •  sotalol (BETAPACE) tablet 80 mg, 80 mg, Oral, Daily, Nhi Mcmullen DO, 80 mg at 06/28/23 0818  •  trimethobenzamide (TIGAN) IM injection 200 mg, 200 mg, Intramuscular, Q6H PRN, Bernice Bautista MD, 200 mg at 06/18/23 1640    Facility-Administered Medications Ordered in Other Encounters:   •  sodium chloride 0.9 % infusion, 75 mL/hr, Intravenous, Continuous, Nita Weber MD, Stopped at 05/26/23 1426    Laboratory Results:  Results from last 7 days   Lab Units 06/28/23  0512 06/27/23  0522 06/26/23  0445 06/25/23  1220 06/25/23  0952 06/24/23  0525 06/23/23  0435 06/22/23  0355   WBC Thousand/uL  --  10.08 11.19*  --   --  10.36* 10.13 9.44   HEMOGLOBIN g/dL  --  7.6* 8.4*  --   --  8.1* 7.8* 8.2*   HEMATOCRIT %  --  26.0* 28.9*  --   --  26.4* 26.7* 26.5*   PLATELETS Thousands/uL  --  205 184  --   --  136* 110* 92*   SODIUM mmol/L 141 143 142  --  137 139 140 139   POTASSIUM mmol/L 4.8 4.5 4.3 4.7 5.8* 3.9 4.0 4.0   CHLORIDE mmol/L 111* 110* 113*  --  111* 108 108 106   CO2 mmol/L 30 30 28  --  26 28 27 30   BUN mg/dL 36* 34* 27*  --  25 19 21 18   CREATININE mg/dL 1.53* 1.56* 1.45*  --  1.62* 1.63* 1.90* 1.90*   CALCIUM mg/dL 8.5 8.4 7.6*  --  8.2* 8.3 7.9* 8.1*   MAGNESIUM mg/dL 1.7  --   --   --   --   --   -- --    PHOSPHORUS mg/dL  --   --  2.3  --   --   --  2.5  --            Portions of the record may have been created with voice recognition software. Occasional wrong word or "sound a like" substitutions may have occurred due to the inherent limitations of voice recognition software.  Read the chart carefully and recognize,

## 2023-06-28 NOTE — QUICK NOTE
Called by nursing staff to evaluate patient prior to EGD with PEG tube placement. Patient appeared to be in respiratory distress on 6 L nasal cannula. He is also having increased secretions and coughing. Based on current evaluation patient is not optimized for procedure. We will postpone this elective procedure to later this week once he is better optimized from a respiratory standpoint to tolerate anesthesia for the procedure. This includes stable O2 requirement, respiratory and hemodynamic stability. Informed patient and wife who are agreeable. Informed primary team.  Please contact GI with any questions.

## 2023-06-28 NOTE — PROGRESS NOTES
Progress note - Palliative and Supportive Care   Manju Steele 81 y.o. male [id number]    Patient Active Problem List   Diagnosis   • Obesity, unspecified obesity severity, unspecified obesity type   • Diabetes ([address])   • Hydrocele   • Chronic anticoagulation   • Acute on chronic anemia   • Atrial fibrillation (HCC)   • History of AVR   • PVD (peripheral vascular disease) ([facility])   • Thrombophlebitis   • History of ventricular tachycardia   • Edema   • Acute on chronic diastolic CHF   • Venous ulcer of right ankle   • Peripheral venous insufficiency   • Ventricular tachycardia   • Secondary lymphedema   • CKD (chronic kidney disease) stage 3, GFR 30-59 ml/min ([facility])   • Morbid obesity   • Benign prostatic hyperplasia with lower urinary tract symptoms   • Acute cholecystitis   • REMA (acute kidney injury) ([address])   • Cardiomegaly   • Chronic bilateral low back pain without sciatica   • 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   • Dyslipidemia   • Endocarditis   • Hyperlipidemia   • Osteoarthritis, knee   • Renal cyst   • Status post right knee replacement   • Swelling of limb   • BPH (benign prostatic hyperplasia)   • History of venous thromboembolism   • Acute on chronic cholecystitis   • Cholecystostomy care [facility])   • Gout   • Calculus of gallbladder   • Extrahepatic biloma   • COVID-19   • Pleural effusion on right   • Melena   • Pulmonary embolism (HCC)   • CHF (congestive heart failure) (HCC)   • Fall   • Acidosis   • Hyponatremia   • Hypophosphatemia   • Prolonged QT interval   • Thrombocytopenia (HCC)   • Septic shock (HCC)   • Dysphagia   • Acute low back pain   • Fever     Active issues specifically addressed today include:   Dysphagia, NG tube in place, VVS on Monday  Severe sepsis secondary to UTI  Acute on chronic low back pain  Heart failure preserved EF, EF 70%  REMA, currently requiring dialysis  Palliative care patient  Frailty in an elderly adult  Goals of care     Plan:  1.  Symptom management -    - Oxycodone 5mg - 7.5 Q 4 hours prn pain   -  Hydromorphone 0.5 Q 4 hours breakthrough pain   -   gabapentin 300 mg Q hs  Delirium precautions: please minimize interruptions and prioritize sleep at night. No TV nor screen time at night. Shades drawn at night. During day, shades up, minimize napping, and encourage meals in chair. 2. Goals -    -  No limits on care. Code Status: Full Code  - Level 1   Decisional apparatus:  Patient is not competent on my exam today. If competence is lost, patient's substitute decision maker would default to spouse by [place] Act 169. Advance Directive / Living Will / POLST:  None on file     Interval history:       Patient reports that he slept well last night. According to nursing, the patient was wide awake at 3 am and was confused, calling out. Likely has element of HS confusion. Will continue gabapentin in hopes for assistance with sleep. Patient noted to have increased phlegm and moist cough. Internal medicine aware. Encouraged deep breathing and coughing. Patient is scheduled for a PEG tube this afternoon.       MEDICATIONS / ALLERGIES:     current meds:   Current Facility-Administered Medications   Medication Dose Route Frequency   • acetaminophen (TYLENOL) oral suspension 650 mg  650 mg Per NG Tube Q6H   • bisacodyl (DULCOLAX) rectal suppository 10 mg  10 mg Rectal Daily   • diphenhydramine, lidocaine, Al/Mg hydroxide, simethicone (Magic Mouthwash) oral solution 10 mL  10 mL Swish & Spit Q6H PRN   • furosemide (LASIX) injection 40 mg  40 mg Intravenous Daily   • gabapentin (NEURONTIN) capsule 300 mg  300 mg Oral HS   • heparin (porcine) subcutaneous injection 5,000 Units  5,000 Units Subcutaneous Q8H [address]   • HYDROmorphone (DILAUDID) injection 0.5 mg  0.5 mg Intravenous Q4H PRN   • HYDROmorphone HCl (DILAUDID) injection 0.2 mg  0.2 mg Intravenous Q2H PRN    Or   • HYDROmorphone (DILAUDID) injection 0.5 mg  0.5 mg Intravenous Q2H PRN   • insulin glargine (LANTUS) subcutaneous injection 10 Units 0.1 mL  10 Units Subcutaneous HS   • insulin lispro (HumaLOG) 100 units/mL subcutaneous injection 2-12 Units  2-12 Units Subcutaneous Q6H   • lidocaine (LIDODERM) 5 % patch 1 patch  1 patch Topical Daily   • lidocaine (LMX) 4 % cream   Topical 4x Daily PRN   • melatonin tablet 6 mg  6 mg Oral HS   • naloxone (NARCAN) 0.04 mg/mL syringe 0.04 mg  0.04 mg Intravenous Q1MIN PRN   • nystatin (MYCOSTATIN) powder   Topical BID   • oxyCODONE (ROXICODONE) oral solution 5 mg  5 mg Per NG Tube Q4H PRN    Or   • oxyCODONE (ROXICODONE) oral solution 7.5 mg  7.5 mg Oral Q4H PRN   • pantoprazole (PROTONIX) injection 40 mg  40 mg Intravenous Q24H AMANDA   • sotalol (BETAPACE) tablet 80 mg  80 mg Oral Daily   • trimethobenzamide (TIGAN) IM injection 200 mg  200 mg Intramuscular Q6H PRN     Facility-Administered Medications Ordered in Other Encounters   Medication Dose Route Frequency   • sodium chloride 0.9 % infusion  75 mL/hr Intravenous Continuous       Allergies   Allergen Reactions   • Tramadol Other (See Comments)     intolerance       OBJECTIVE:    Physical Exam  Physical Exam  Vitals and nursing note reviewed. Constitutional:       General: He is not in acute distress. Appearance: He is well-developed. HENT:      Head: Normocephalic and atraumatic. Comments: l nares NG  Eyes:      Conjunctiva/sclera: Conjunctivae normal.   Cardiovascular:      Rate and Rhythm: Normal rate and regular rhythm. Heart sounds: No murmur heard. Pulmonary:      Effort: No respiratory distress. Breath sounds: Decreased air movement present. Rhonchi present. Comments: Audible mucus with respiration   Abdominal:      Palpations: Abdomen is soft. Tenderness: There is no abdominal tenderness. Musculoskeletal:         General: No swelling. Cervical back: Neck supple. Skin:     General: Skin is warm and dry.       Capillary Refill: Capillary refill takes less than 2 seconds. Neurological:      Mental Status: He is alert. He is disoriented. Psychiatric:         Attention and Perception: He is inattentive. Mood and Affect: Mood normal.         Speech: Speech normal.         Cognition and Memory: Memory is impaired. Lab Results:   CBC: No results found for: "WBC", "HGB", "HCT", "MCV", "PLT", "ADJUSTEDWBC", "RBC", "MCH", "MCHC", "RDW", "MPV", "NRBC", CMP:   Lab Results   Component Value Date    SODIUM 141 06/28/2023    K 4.8 06/28/2023     (H) 06/28/2023    CO2 30 06/28/2023    BUN 36 (H) 06/28/2023    CREATININE 1.53 (H) 06/28/2023    CALCIUM 8.5 06/28/2023    EGFR 40 06/28/2023   , PT/PTT:No results found for: "PT", "PTT"      Counseling / Coordination of Care    Total floor / unit time spent today 25+ minutes. Greater than 50% of total time was spent with the patient and / or family counseling and / or coordination of care. A description of the counseling / coordination of care: review of chart, review of medications, collaboration with primary team, supportive listening.

## 2023-06-28 NOTE — ASSESSMENT & PLAN NOTE
Wt Readings from Last 3 Encounters:   06/28/23 135 kg (297 lb 3.2 oz)   06/07/23 136 kg (299 lb 13.2 oz)   06/02/23 130 kg (285 lb 8 oz)       Patient with a hx of HFpEF70%.   Currently on IV Lasix per nephrology  Monitor volume status

## 2023-06-28 NOTE — ASSESSMENT & PLAN NOTE
Patient spiked a fever of 101.4  6/24/23. S/p  Rocephin x 7 days for urosepsis earlier in the admission. Currently afebrile  Denies abdominal pain, dyspnea, chest pain, nausea, vomiting.    CXR with mild pulmonary edema, procal and lactic acid negative  Negative blood cultures  UA negative for WBC or nitrites  Patient was started on ceftriaxone on 6/26 for possible aspiration  Fever has resolved  Plan for PEG tube placement today  Discontinue IV antibiotic and observe

## 2023-06-28 NOTE — ASSESSMENT & PLAN NOTE
Lab Results   Component Value Date    HGBA1C 5.0 06/15/2023       Recent Labs     06/27/23  1733 06/27/23  2322 06/28/23  0545 06/28/23  1146   POCGLU 172* 149* 125 143*       Blood Sugar Average: Last 72 hrs:  (P) 164.4   Lab Results   Component Value Date    HGBA1C 5.0 06/15/2023       Recent Labs     06/27/23  1733 06/27/23  2322 06/28/23  0545 06/28/23  1146   POCGLU 172* 149* 125 143*       Blood Sugar Average: Last 72 hrs:  (P) 164.4     Plan:  · Home regimen of metformin held in the setting of REMA and metabolic acidosis.    · Continue with SSI QID   · Continue Lantus nightly

## 2023-06-28 NOTE — ACP (ADVANCE CARE PLANNING)
Goals of care discussion held with entire family. I explained that Julissa Maldonado is having difficulty managing his secretion. His breathing is very compromised and he is at risk for requiring intubation. I discussed that intubation and suctioning could help with the immediate problem but it does not solve the underlying dysphagia and inability to effectively manage secretions. I explained that intubation typically requires sedation and that Julissa Maldonado would not be able to talk while intubated. Paloma, patients wife, would then have to make all of Dex's medical decisions. I also expressed my concern that if Julissa Maldonado were to be intubated and successfully extubated, he would be extremely high risk for requiring re-intubation at some point given his known dysphagia and overall generalized weakness. I then discussed the alternatives to intubation which would be continued non-invasive medical therapy and if that were to fail, we would transition to comfort directed therapy. [place] Braxton  was able to ask questions regarding the options. She then decided to change Dex's code status to DNR/DNI. Julissa Maldonado was present for this conversation and shook his head yes in agreement that he would not want to be intubated. I made them aware that we would try medical treatment but could call them as soon as tonight to make them aware that Dex's respiratory status was not improving. All questions answered to their satisfaction. Pastoral care at the bedside.

## 2023-06-28 NOTE — RESPIRATORY THERAPY NOTE
RT Protocol Note  Gato Quijano 81 y.o. male MRN: [id number]  Unit/Bed#: [facility] 515-01 Encounter: [id number]    Assessment    Principal Problem:    REMA (acute kidney injury) ([address])  Active Problems:    Diabetes ([address])    Acute on chronic anemia    Atrial fibrillation (HCC)    History of ventricular tachycardia    CHF (congestive heart failure) (HCC)    Prolonged QT interval    Thrombocytopenia (HCC)    Septic shock (HCC)    Dysphagia    Acute low back pain    Fever    Acute respiratory failure (HCC)      Home Pulmonary Medications:  NA       Past Medical History:   Diagnosis Date    Anemia     Arthritis     Atrial fibrillation ([address])     Basal cell carcinoma 03/22/2022    Tip of Nose    CHF (congestive heart failure) (HCC)     Diabetes mellitus (HCC)     Niddm    DVT (deep vein thrombosis) in pregnancy 1966    not in pregnancy    DVT (deep venous thrombosis) ([address]) 1966    Dyslipidemia     Encephalopathy acute 11/4/2022    GERD (gastroesophageal reflux disease)     Hyperlipidemia     Hypertension     Hypomagnesemia 10/19/2022    Irregular heart beat     Afib    Morbid obesity due to excess calories ([address])     Resolved 9/2/2014     Pulmonary embolism (HCC)     Sepsis (HCC)     Squamous cell skin cancer 07/30/2020    Left posterior scalp    Visual impairment      Social History     Socioeconomic History    Marital status: /Civil Union     Spouse name: None    Number of children: None    Years of education: None    Highest education level: None   Occupational History    None   Tobacco Use    Smoking status: Never    Smokeless tobacco: Never   Vaping Use    Vaping Use: Never used   Substance and Sexual Activity    Alcohol use: Not Currently     Comment: no alcohol in 28 yrs    Drug use: Never    Sexual activity: Yes     Partners: Female     Birth control/protection: None   Other Topics Concern    None   Social History Narrative    None     Social Determinants of Health     Financial Resource Strain: Not on file   Food Insecurity: No Food Insecurity (6/15/2023)    Hunger Vital Sign     Worried About Running Out of Food in the Last Year: Never true     Ran Out of Food in the Last Year: Never true   Transportation Needs: No Transportation Needs (6/15/2023)    PRAPARE - Transportation     Lack of Transportation (Medical): No     Lack of Transportation (Non-Medical): No   Physical Activity: Not on file   Stress: Not on file   Social Connections: Not on file   Intimate Partner Violence: Not on file   Housing Stability: Low Risk  (6/15/2023)    Housing Stability Vital Sign     Unable to Pay for Housing in the Last Year: No     Number of Places Lived in the Last Year: 1     Unstable Housing in the Last Year: No       Subjective         Objective    Physical Exam:   Assessment Type: Assess only  General Appearance: Alert, Awake  Respiratory Pattern: Dyspnea at rest  Chest Assessment: Chest expansion symmetrical  Bilateral Breath Sounds: Rhonchi, Coarse  Cough: Congested, Moist  Suction: Nasal    Vitals:  Blood pressure 112/75, pulse 91, temperature 98.4 °F (36.9 °C), temperature source Oral, resp. rate (!) 26, height 6' 1" (1.854 m), weight 135 kg (297 lb 3.2 oz), SpO2 98 %. Imaging and other studies:           Plan    Respiratory Plan: Mild Distress pathway  Airway Clearance Plan: Percussive Vest     Resp Comments: Rapid called due to pts increased wob and inability to mobilize secretions. Moved pt to icu 515 and placed on hfnc 60L 45%. CPT via vest ordered by Blaine MOTT w/ OK to perform w/o back brace at less than 45 degrees in bed.

## 2023-06-28 NOTE — PROGRESS NOTES
[address] [facility]  Progress Note  Name: Deisi Juarez  MRN: [id number]  Unit/Bed#: [facility] 128-56 I Date of Admission: 6/14/2023   Date of Service: 6/28/2023 I Hospital Day: 14    Assessment/Plan   Septic shock [facility])  Assessment & Plan  2/2 MDR E.coli urosepsis. Briefly required ICU stay and pressor support. Currently off pressors. · UCx shows Ecoli and klebsiella both susceptible to ceftriaxone  · Completed ceftriaxone for a total abx duration of 7 days. * REMA (acute kidney injury) [facility])  Assessment & Plan  Patient downgraded from critical care, admitted for septic shock secondary to UTI leading to REMA requiring dialysis. Cr improving  · Likely UTI was the initial cause of REMA, exacerbated by concurrent metformin a  · Combs currently in place as pt noted to have urinary incontinence w/ skin breakdown,  · CVVH discontinued 6/19  · tunneled dialysis catheter placed on 6/19, on intermittent hemodialysis , status post removal on 6/28  · Nephrology following, continue with IV lasix 40 mg daily   · Renal function improving    Acute on chronic anemia  Assessment & Plan  Patient with chronic macrocytic anemia baseline 7-8. · Required 2U PRBC this admission  · EGD completed: Single small, superficial ulcer in the 1st part of the duodenum with clean base   · Continue PPI  · Watch for s/s of GI bleed  · Resume DVT ppx with heparin and trend hgb. · Continue to hold  [facility] with coumadin for  PEG placement today  · Anemia panel reviewed    Fever  Assessment & Plan  Patient spiked a fever of 101.4  6/24/23. S/p  Rocephin x 7 days for urosepsis earlier in the admission. Currently afebrile  Denies abdominal pain, dyspnea, chest pain, nausea, vomiting.    CXR with mild pulmonary edema, procal and lactic acid negative  Negative blood cultures  UA negative for WBC or nitrites  Patient was started on ceftriaxone on 6/26 for possible aspiration  Fever has resolved  Plan for PEG tube placement today  Discontinue IV antibiotic and observe      Acute low back pain  Assessment & Plan  Patient reported acutely worsening low back pain that is very severe, 10/10, low back across, no radiation. No weakness of lower extremity. Xray obtained: Limited study without a true lateral image. Unchanged severe L4 compression deformity. Backspace Progressive loss of height of the L2 vertebral body may represent an acute fracture. Consider CT follow-up. CT L spine scan shows acute L1 fracture and chronic L4 fracture  Pain control with IV pain meds. Palliative care following for pain control, appreciate recommendations  Neurosx consult appreciated, no surgery at this point  LSO brace    Dysphagia  Assessment & Plan  Patient had VBS with speech   · Unfortunately he is aspiration with both solid and liquid. · Speech recommends PEG tube. · GI recommends trial of NG tube first prior to committing to PEG tube. · Started NG tube feeding 6/22, currently at goal, tolerating. · Repeat VBS 6/26 with worsening aspiration,  · Plan for PEG tube placement today by GI    CHF (congestive heart failure) ([facility])  Assessment & Plan  Wt Readings from Last 3 Encounters:   06/28/23 135 kg (297 lb 3.2 oz)   06/07/23 136 kg (299 lb 13.2 oz)   06/02/23 130 kg (285 lb 8 oz)       Patient with a hx of [id number]%. Currently on IV Lasix per nephrology  Monitor volume status    History of ventricular tachycardia  Assessment & Plan  S/p DUAL CHAMBER ICD/ NOT MRI CONDITIONAL .     Atrial fibrillation [facility])  Assessment & Plan  Home regimen of Sotalol and warfarin, both held on admission  · Continue to hold Coumadin  · Restarted on sotalol        Diabetes [facility])  Assessment & Plan  Lab Results   Component Value Date    HGBA1C 5.0 06/15/2023       Recent Labs     06/27/23  1733 06/27/23  2322 06/28/23  0545 06/28/23  1146   POCGLU 172* 149* 125 143*       Blood Sugar Average: Last 72 hrs:  (P) 164.4   Lab Results   Component Value Date    HGBA1C 5.0 06/15/2023       Recent Labs     23  1733 23  2322 23  0545 23  1146   POCGLU 172* 149* 125 143*       Blood Sugar Average: Last 72 hrs:  (P) 164.4     Plan:  · Home regimen of metformin held in the setting of REMA and metabolic acidosis. · Continue with SSI QID   · Continue Lantus nightly             VTE Pharmacologic Prophylaxis:   High Risk (Score >/= 5) - Pharmacological DVT Prophylaxis Ordered: heparin. Sequential Compression Devices Ordered. Patient Centered Rounds: I performed bedside rounds with nursing staff today. Discussions with Specialists or Other Care Team Provider:     Education and Discussions with Family / Patient: Updated  (wife) at bedside. Total Time Spent on Date of Encounter in care of patient: 55 minutes This time was spent on one or more of the following: performing physical exam; counseling and coordination of care; obtaining or reviewing history; documenting in the medical record; reviewing/ordering tests, medications or procedures; communicating with other healthcare professionals and discussing with patient's family/caregivers. Current Length of Stay: 14 day(s)  Current Patient Status: Inpatient   Certification Statement: The patient will continue to require additional inpatient hospital stay due to Management dysphagia and volume overload  Discharge Plan: Anticipate discharge in >72 hrs to rehab facility.     Code Status: Level 1 - Full Code    Subjective:   Patient is comfortable in bed  Intermittent shortness of breath  Wife at bedside  Plan for PEG tube placement today, discontinue NG tube now due to patient discomfort and shortness of breath    Objective:     Vitals:   Temp (24hrs), Av.3 °F (36.8 °C), Min:97.5 °F (36.4 °C), Max:98.7 °F (37.1 °C)    Temp:  [97.5 °F (36.4 °C)-98.7 °F (37.1 °C)] 98.7 °F (37.1 °C)  HR:  [102-124] 104  Resp:  [14-19] 17  BP: (119-148)/(55-83) 121/62  SpO2:  [92 %-96 %] 92 %  Body mass index is 39.21 kg/m². Input and Output Summary (last 24 hours): Intake/Output Summary (Last 24 hours) at 6/28/2023 1231  Last data filed at 6/28/2023 1201  Gross per 24 hour   Intake 1599 ml   Output 1150 ml   Net 449 ml       Physical Exam:   Physical Exam   Patient is lethargic, with weakness and severe general deconditioning  Chronically ill-appearing  Obese  NG tube in place  Lungs with decreased breath sound bilateral with intermittent rhonchi  Positive S1-S2 no murmur  Abdomen soft obese nontender  Combs catheter in place  Bilateral lower extremity venous stasis edema    Additional Data:     Labs:  Results from last 7 days   Lab Units 06/27/23  0522   WBC Thousand/uL 10.08   HEMOGLOBIN g/dL 7.6*   HEMATOCRIT % 26.0*   PLATELETS Thousands/uL 205     Results from last 7 days   Lab Units 06/28/23  0512 06/27/23  0522 06/26/23  0445   SODIUM mmol/L 141   < > 142   POTASSIUM mmol/L 4.8   < > 4.3   CHLORIDE mmol/L 111*   < > 113*   CO2 mmol/L 30   < > 28   BUN mg/dL 36*   < > 27*   CREATININE mg/dL 1.53*   < > 1.45*   ANION GAP mmol/L 0   < > 1   CALCIUM mg/dL 8.5   < > 7.6*   ALBUMIN g/dL  --   --  1.8*   GLUCOSE RANDOM mg/dL 131   < > 137    < > = values in this interval not displayed. Results from last 7 days   Lab Units 06/28/23  0512   INR  1.17     Results from last 7 days   Lab Units 06/28/23  1146 06/28/23  0545 06/27/23  2322 06/27/23  1733 06/27/23  1105 06/27/23  0612 06/27/23  0011 06/26/23  1800 06/26/23  1143 06/26/23  0602 06/26/23  0002 06/25/23  1730   POC GLUCOSE mg/dl 143* 125 149* 172* 161* 201* 142* 202* 161* 164* 156* 162*         Results from last 7 days   Lab Units 06/26/23  2016 06/25/23  0555 06/24/23  1404   LACTIC ACID mmol/L 1.5  --  1.9   PROCALCITONIN ng/ml  --  0.20 0.19       Lines/Drains:  Invasive Devices     Peripheral Intravenous Line  Duration           Peripheral IV 06/26/23 Dorsal (posterior); Left Forearm 2 days          Drain  Duration           Urethral Catheter Non-latex 16 Fr. 2 days              Urinary Catheter:  Goal for removal: Voiding trial when ambulation improves               Imaging: No pertinent imaging reviewed. Recent Cultures (last 7 days):   Results from last 7 days   Lab Units 06/24/23  1405   BLOOD CULTURE  No Growth at 72 hrs. No Growth at 72 hrs.        Last 24 Hours Medication List:   Current Facility-Administered Medications   Medication Dose Route Frequency Provider Last Rate   • acetaminophen  650 mg Per NG Tube Q6H Madelyn Payne DO     • bisacodyl  10 mg Rectal Daily Emeterio Cifuentes MD     • diphenhydramine, lidocaine, Al/Mg hydroxide, simethicone  10 mL Swish & Spit Q6H PRN Bernice Bautista MD     • furosemide  40 mg Intravenous Daily Luis Felipe Austin MD     • gabapentin  300 mg Oral HS ODESSA Sanchez     • heparin (porcine)  5,000 Units Subcutaneous Q8H [facility] El Duarte MD     • HYDROmorphone  0.5 mg Intravenous Q4H PRN ODESSA Sanchez     • insulin glargine  10 Units Subcutaneous HS Nhi Mcmullen DO     • insulin lispro  2-12 Units Subcutaneous Q6H ODESSA Rush     • lidocaine  1 patch Topical Daily Bernice Bautista MD     • lidocaine   Topical 4x Daily PRN Bernice Bautista MD     • melatonin  6 mg Oral HS ODESSA Rush     • naloxone  0.04 mg Intravenous Q1MIN PRN Kaden Felipe DO     • nystatin   Topical BID Lashae Paredes DO     • oxyCODONE  5 mg Per NG Tube Q4H PRN ODESSA Sanchez      Or   • oxyCODONE  7.5 mg Oral Q4H PRN ODESSA Sanchez     • pantoprazole  40 mg Intravenous Q24H Oscar Dee MD     • sotalol  80 mg Oral Daily Nhi Mcmullen DO     • trimethobenzamide  200 mg Intramuscular Q6H PRN Bernice Bautista MD       Facility-Administered Medications Ordered in Other Encounters   Medication Dose Route Frequency Provider Last Rate   • sodium chloride  75 mL/hr Intravenous Continuous Nita Weber MD Stopped (05/26/23 8161)        Today, Patient Was Seen By: Nhi Mcmullen DO    **Please Note: This note may have been constructed using a voice recognition system. **

## 2023-06-29 PROBLEM — J96.01 ACUTE RESPIRATORY FAILURE WITH HYPOXIA (HCC): Status: ACTIVE | Noted: 2023-01-01

## 2023-06-29 PROBLEM — D69.6 THROMBOCYTOPENIA (HCC): Status: RESOLVED | Noted: 2023-01-01 | Resolved: 2023-01-01

## 2023-06-29 PROBLEM — R65.21 SEPTIC SHOCK (HCC): Status: RESOLVED | Noted: 2023-01-01 | Resolved: 2023-01-01

## 2023-06-29 PROBLEM — G93.41 ACUTE METABOLIC ENCEPHALOPATHY: Status: ACTIVE | Noted: 2023-01-01

## 2023-06-29 PROBLEM — R13.12 OROPHARYNGEAL DYSPHAGIA: Status: ACTIVE | Noted: 2023-01-01

## 2023-06-29 PROBLEM — N17.9 AKI (ACUTE KIDNEY INJURY) (HCC): Chronic | Status: ACTIVE | Noted: 2021-11-18

## 2023-06-29 PROBLEM — J69.0 ASPIRATION PNEUMONIA (HCC): Status: ACTIVE | Noted: 2023-01-01

## 2023-06-29 PROBLEM — A41.9 SEPTIC SHOCK (HCC): Status: RESOLVED | Noted: 2023-01-01 | Resolved: 2023-01-01

## 2023-06-29 PROBLEM — R50.9 FEVER: Status: RESOLVED | Noted: 2023-01-01 | Resolved: 2023-01-01

## 2023-06-29 PROBLEM — J96.00 ACUTE RESPIRATORY FAILURE (HCC): Status: RESOLVED | Noted: 2023-01-01 | Resolved: 2023-01-01

## 2023-06-29 NOTE — ASSESSMENT & PLAN NOTE
Assessment:   · Related to delirium, hypoxia, overall large clinical decline  · Patient still appropriately answers questions, but more lethargic     Plan:   · Continue frequent neuro checks     · Sleep/wake cycle regulation as able   · Melatonin 6mg qHS  · On hold while NPO  · CAM-ICU BID

## 2023-06-29 NOTE — ACP (ADVANCE CARE PLANNING)
Goals of care once again discussed with the patient, his wife and his daughters. Patient continues to be in respiratory distress related to significant oral secretions that he is unable to clear. Options were discussed including comfort care. Patient was able to participate in discussion and asked to "end things now". Family acknowledges that he has been suffering the past several days. We talked about what comfort care would entail. Patient and family elected to proceed with comfort care. Comfort cart at the bedside.

## 2023-06-29 NOTE — PLAN OF CARE
Problem: METABOLIC, FLUID AND ELECTROLYTES - ADULT  Goal: Electrolytes maintained within normal limits  Description: INTERVENTIONS:  - Monitor labs and assess patient for signs and symptoms of electrolyte imbalances  - Administer electrolyte replacement as ordered  - Monitor response to electrolyte replacements, including repeat lab results as appropriate  - Instruct patient on fluid and nutrition as appropriate  Outcome: Not Progressing  Goal: Fluid balance maintained  Description: INTERVENTIONS:  - Monitor labs   - Monitor I/O and WT  - Instruct patient on fluid and nutrition as appropriate  - Assess for signs & symptoms of volume excess or deficit  Outcome: Not Progressing  Goal: Glucose maintained within target range  Description: INTERVENTIONS:  - Monitor Blood Glucose as ordered  - Assess for signs and symptoms of hyperglycemia and hypoglycemia  - Administer ordered medications to maintain glucose within target range  - Assess nutritional intake and initiate nutrition service referral as needed  Outcome: Not Progressing     Problem: RESPIRATORY - ADULT  Goal: Achieves optimal ventilation and oxygenation  Description: INTERVENTIONS:  - Assess for changes in respiratory status  - Assess for changes in mentation and behavior  - Position to facilitate oxygenation and minimize respiratory effort  - Oxygen administered by appropriate delivery if ordered  - Initiate smoking cessation education as indicated  - Encourage broncho-pulmonary hygiene including cough, deep breathe, Incentive Spirometry  - Assess the need for suctioning and aspirate as needed  - Assess and instruct to report SOB or any respiratory difficulty  - Respiratory Therapy support as indicated  Outcome: Not Progressing     Problem: GENITOURINARY - ADULT  Goal: Maintains or returns to baseline urinary function  Description: INTERVENTIONS:  - Assess urinary function  - Encourage oral fluids to ensure adequate hydration if ordered  - Administer IV fluids as ordered to ensure adequate hydration  - Administer ordered medications as needed  - Offer frequent toileting  - Follow urinary retention protocol if ordered  Outcome: Not Progressing     Problem: MUSCULOSKELETAL - ADULT  Goal: Maintain or return mobility to safest level of function  Description: INTERVENTIONS:  - Assess patient's ability to carry out ADLs; assess patient's baseline for ADL function and identify physical deficits which impact ability to perform ADLs (bathing, care of mouth/teeth, toileting, grooming, dressing, etc.)  - Assess/evaluate cause of self-care deficits   - Assess range of motion  - Assess patient's mobility  - Assess patient's need for assistive devices and provide as appropriate  - Encourage maximum independence but intervene and supervise when necessary  - Involve family in performance of ADLs  - Assess for home care needs following discharge   - Consider OT consult to assist with ADL evaluation and planning for discharge  - Provide patient education as appropriate  Outcome: Not Progressing     Problem: MOBILITY - ADULT  Goal: Maintain or return to baseline ADL function  Description: INTERVENTIONS:  -  Assess patient's ability to carry out ADLs; assess patient's baseline for ADL function and identify physical deficits which impact ability to perform ADLs (bathing, care of mouth/teeth, toileting, grooming, dressing, etc.)  - Assess/evaluate cause of self-care deficits   - Assess range of motion  - Assess patient's mobility; develop plan if impaired  - Assess patient's need for assistive devices and provide as appropriate  - Encourage maximum independence but intervene and supervise when necessary  - Involve family in performance of ADLs  - Assess for home care needs following discharge   - Consider OT consult to assist with ADL evaluation and planning for discharge  - Provide patient education as appropriate  Outcome: Not Progressing  Goal: Maintains/Returns to pre admission functional level  Description: INTERVENTIONS:  - Perform BMAT or MOVE assessment daily.   - Set and communicate daily mobility goal to care team and patient/family/caregiver. - Collaborate with rehabilitation services on mobility goals if consulted  - Perform Range of Motion  times a day. - Reposition patient every  hours. - Dangle patient  times a day  - Stand patient  times a day  - Ambulate patient  times a day  - Out of bed to chair  times a day   - Out of bed for meals  times a day  - Out of bed for toileting  - Record patient progress and toleration of activity level   Outcome: Not Progressing     Problem: Prexisting or High Potential for Compromised Skin Integrity  Goal: Skin integrity is maintained or improved  Description: INTERVENTIONS:  - Identify patients at risk for skin breakdown  - Assess and monitor skin integrity  - Assess and monitor nutrition and hydration status  - Monitor labs   - Assess for incontinence   - Turn and reposition patient  - Assist with mobility/ambulation  - Relieve pressure over bony prominences  - Avoid friction and shearing  - Provide appropriate hygiene as needed including keeping skin clean and dry  - Evaluate need for skin moisturizer/barrier cream  - Collaborate with interdisciplinary team   - Patient/family teaching  - Consider wound care consult   Outcome: Not Progressing     Problem: Nutrition/Hydration-ADULT  Goal: Nutrient/Hydration intake appropriate for improving, restoring or maintaining nutritional needs  Description: Monitor and assess patient's nutrition/hydration status for malnutrition. Collaborate with interdisciplinary team and initiate plan and interventions as ordered. Monitor patient's weight and dietary intake as ordered or per policy. Utilize nutrition screening tool and intervene as necessary. Determine patient's food preferences and provide high-protein, high-caloric foods as appropriate.      INTERVENTIONS:  - Monitor oral intake, urinary output, labs, and treatment plans  - Assess nutrition and hydration status and recommend course of action  - Evaluate amount of meals eaten  - Assist patient with eating if necessary   - Allow adequate time for meals  - Recommend/ encourage appropriate diets, oral nutritional supplements, and vitamin/mineral supplements  - Order, calculate, and assess calorie counts as needed  - Recommend, monitor, and adjust tube feedings and TPN/PPN based on assessed needs  - Assess need for intravenous fluids  - Provide specific nutrition/hydration education as appropriate  - Include patient/family/caregiver in decisions related to nutrition  Outcome: Not Progressing     Problem: PAIN - ADULT  Goal: Verbalizes/displays adequate comfort level or baseline comfort level  Description: Interventions:  - Encourage patient to monitor pain and request assistance  - Assess pain using appropriate pain scale  - Administer analgesics based on type and severity of pain and evaluate response  - Implement non-pharmacological measures as appropriate and evaluate response  - Consider cultural and social influences on pain and pain management  - Notify physician/advanced practitioner if interventions unsuccessful or patient reports new pain  Outcome: Not Progressing     Problem: INFECTION - ADULT  Goal: Absence or prevention of progression during hospitalization  Description: INTERVENTIONS:  - Assess and monitor for signs and symptoms of infection  - Monitor lab/diagnostic results  - Monitor all insertion sites, i.e. indwelling lines, tubes, and drains  - Monitor endotracheal if appropriate and nasal secretions for changes in amount and color  - [place] appropriate cooling/warming therapies per order  - Administer medications as ordered  - Instruct and encourage patient and family to use good hand hygiene technique  - Identify and instruct in appropriate isolation precautions for identified infection/condition  Outcome: Not Progressing     Problem: SAFETY ADULT  Goal: Maintain or return to baseline ADL function  Description: INTERVENTIONS:  -  Assess patient's ability to carry out ADLs; assess patient's baseline for ADL function and identify physical deficits which impact ability to perform ADLs (bathing, care of mouth/teeth, toileting, grooming, dressing, etc.)  - Assess/evaluate cause of self-care deficits   - Assess range of motion  - Assess patient's mobility; develop plan if impaired  - Assess patient's need for assistive devices and provide as appropriate  - Encourage maximum independence but intervene and supervise when necessary  - Involve family in performance of ADLs  - Assess for home care needs following discharge   - Consider OT consult to assist with ADL evaluation and planning for discharge  - Provide patient education as appropriate  Outcome: Not Progressing  Goal: Maintains/Returns to pre admission functional level  Description: INTERVENTIONS:  - Perform BMAT or MOVE assessment daily.   - Set and communicate daily mobility goal to care team and patient/family/caregiver. - Collaborate with rehabilitation services on mobility goals if consulted  - Perform Range of Motion  times a day. - Reposition patient every  hours.   - Dangle patient  times a day  - Stand patient  times a day  - Ambulate patient  times a day  - Out of bed to chair  times a day   - Out of bed for meals  times a day  - Out of bed for toileting  - Record patient progress and toleration of activity level   Outcome: Not Progressing  Goal: Patient will remain free of falls  Description: INTERVENTIONS:  - Educate patient/family on patient safety including physical limitations  - Instruct patient to call for assistance with activity   - Consult OT/PT to assist with strengthening/mobility   - Keep Call bell within reach  - Keep bed low and locked with side rails adjusted as appropriate  - Keep care items and personal belongings within reach  - Initiate and maintain comfort rounds  - Make Fall Risk Sign visible to staff  - Offer Toileting every  Hours, in advance of need  - Initiate/Maintain alarm  - Obtain necessary fall risk management equipment  - Apply yellow socks and bracelet for high fall risk patients  - Consider moving patient to room near nurses station  Outcome: Not Progressing     Problem: Knowledge Deficit  Goal: Patient/family/caregiver demonstrates understanding of disease process, treatment plan, medications, and discharge instructions  Description: Complete learning assessment and assess knowledge base.   Interventions:  - Provide teaching at level of understanding  - Provide teaching via preferred learning methods  Outcome: Not Progressing     Problem: DISCHARGE PLANNING  Goal: Discharge to home or other facility with appropriate resources  Description: INTERVENTIONS:  - Identify barriers to discharge w/patient and caregiver  - Arrange for needed discharge resources and transportation as appropriate  - Identify discharge learning needs (meds, wound care, etc.)  - Arrange for interpretive services to assist at discharge as needed  - Refer to Case Management Department for coordinating discharge planning if the patient needs post-hospital services based on physician/advanced practitioner order or complex needs related to functional status, cognitive ability, or social support system  Outcome: Not Progressing

## 2023-06-29 NOTE — ASSESSMENT & PLAN NOTE
Assessment:   · 6/22/23 Lipid panel:   · Total cholesterol: 111  · Triglyceride: 125  · HDL: 34  · LDL: 56     Plan:   · Holding home atorvastatin 40mg qHS

## 2023-06-29 NOTE — PROGRESS NOTES
NEPHROLOGY PROGRESS NOTE   Criss Villagomez 81 y.o. male MRN: [id number]  Unit/Bed#: [facility] 491-75 Encounter: [id number]  Reason for Consult: REMA    ASSESSMENT AND PLAN:  REMA on CKD stage III, baseline creatinine 1.3-1.5  -Initial REMA suspect secondary to septic ATN, contrast exposure  -Status post emergent HD on 6/14/2023 for hyperkalemia.  Then needed CRRT which was discontinued on 6/19/2023.  Last HD treatment on 6/23/2023. Since then creatinine had improved and plateaued around 1.5.  -Secondary rise in creatinine up to 1.9 today suspect secondary to lower normal BP, volume overload  -Good urine output.    -Currently has Combs catheter due to incontinence/concern for skin breakdown  -Permacath has been discontinued.  -No urgent indication for dialysis, I had detailed discussion with patient and his family at bedside regarding slowly worsening creatinine again. Ongoing discussion regarding goals of care.     E. coli urosepsis, status post antibiotic, initially required vasopressors now remains off. Currently restarted on empiric antibiotic. Hypoxic respiratory failure, diastolic CHF  -echo shows EF 24%, normal diastolic function, right ventricular volume overload, moderate TR, dilated IVC. -Patient had worsening respiratory distress yesterday now remains on hyponasal cannula at the time of my encounter. Currently remains on Bumex drip.   -Chest x-ray shows moderate right pleural effusion     Discussed above plan in detail with ICU team and they agree with above recommendations. Overall poor prognosis. SUBJECTIVE:  Patient seen and examined at bedside. He remains on high flow nasal cannula.     OBJECTIVE:  Current Weight: Weight - Scale: 135 kg (297 lb 9.9 oz)  Vitals:    06/29/23 1400   BP: 115/71   Pulse: (!) 110   Resp: (!) 39   Temp: 99.7 °F (37.6 °C)   SpO2: 97%       Intake/Output Summary (Last 24 hours) at 6/29/2023 1450  Last data filed at 6/29/2023 1400  Gross per 24 hour   Intake 329.71 ml   Output 1740 ml   Net -1410.29 ml     Wt Readings from Last 3 Encounters:   06/29/23 135 kg (297 lb 9.9 oz)   06/07/23 136 kg (299 lb 13.2 oz)   06/02/23 130 kg (285 lb 8 oz)     Temp Readings from Last 3 Encounters:   06/29/23 99.7 °F (37.6 °C)   06/13/23 (!) 97 °F (36.1 °C)   06/10/23 98.2 °F (36.8 °C) (Temporal)     BP Readings from Last 3 Encounters:   06/29/23 115/71   06/13/23 130/64   06/10/23 120/80     Pulse Readings from Last 3 Encounters:   06/29/23 (!) 110   06/13/23 59   06/10/23 62      Physical Examination:  Eyes: Mild conjunctival pallor present  Neck: No obvious lymphadenopathy appreciated  Respiratory: Decreased breath sound at base  CVS: 1-2+ edema in legs  GI: Soft, nondistended  CNS: Overall sleepy but opens eyes, follows commands  Skin: No new rash  Musculoskeletal: No obvious new gross deformity noted    Medications:    Current Facility-Administered Medications:   •  acetaminophen (TYLENOL) oral suspension 650 mg, 650 mg, Per NG Tube, Q6H, [employer] & ODESSA Kim, 650 mg at 06/28/23 0716  •  albuterol inhalation solution 2.5 mg, 2.5 mg, Nebulization, Q4H PRN, Da Wilson MD PhD  •  bisacodyl (DULCOLAX) rectal suppository 10 mg, 10 mg, Rectal, Daily, ODESSA Monroy, 10 mg at 06/27/23 8253  •  bumetanide (BUMEX) 12.5 mg infusion 50 mL, 0.5 mg/hr, Intravenous, Continuous, Amara Rockwell PA-C, Last Rate: 2 mL/hr at 06/29/23 1226, 0.5 mg/hr at 06/29/23 1226  •  cefepime (MAXIPIME) 1,000 mg in dextrose 5 % 50 mL IVPB, 1,000 mg, Intravenous, Q12H, Yuval Peoples PA-C, Stopped at 06/29/23 0800  •  diphenhydramine, lidocaine, Al/Mg hydroxide, simethicone (Magic Mouthwash) oral solution 10 mL, 10 mL, Swish & Spit, Q6H PRN, ODESSA Monroy  •  gabapentin (NEURONTIN) capsule 300 mg, 300 mg, Oral, HS, ODESSA Monroy, 300 mg at 06/27/23 2101  •  heparin (porcine) subcutaneous injection 5,000 Units, 5,000 Units, Subcutaneous, Q8H [address], Tamir Kimball [facility], 5,000 Units at 06/29/23 1414  •  HYDROmorphone HCl (DILAUDID) injection 0.2 mg, 0.2 mg, Intravenous, Q2H PRN, 0.2 mg at 06/28/23 1325 **OR** [DISCONTINUED] HYDROmorphone (DILAUDID) injection 0.5 mg, 0.5 mg, Intravenous, Q2H PRN, ODESSA Kirkpatrick, 0.5 mg at 06/29/23 0736  •  insulin glargine (LANTUS) subcutaneous injection 10 Units 0.1 mL, 10 Units, Subcutaneous, HS, ODESSA Jacobs, 10 Units at 06/28/23 2130  •  insulin lispro (HumaLOG) 100 units/mL subcutaneous injection 2-12 Units, 2-12 Units, Subcutaneous, Q6H, 2 Units at 06/28/23 1741 **AND** Fingerstick Glucose (POCT), , , Q6H, ODESSA Kirkpatrick  •  ipratropium (ATROVENT) 0.02 % inhalation solution 0.5 mg, 0.5 mg, Nebulization, TID, ODESSA Jacobs, 0.5 mg at 06/29/23 1428  •  levalbuterol (XOPENEX) inhalation solution 1.25 mg, 1.25 mg, Nebulization, TID, ODESSA Jacobs, 1.25 mg at 06/29/23 1440  •  lidocaine (LIDODERM) 5 % patch 1 patch, 1 patch, Topical, Daily, ODESSA Jacobs, 1 patch at 06/29/23 3576  •  lidocaine (LMX) 4 % cream, , Topical, 4x Daily PRN, ODESSA Jacobs, Given at 06/18/23 1640  •  melatonin tablet 6 mg, 6 mg, Oral, HS, ODESSA Jacobs, 6 mg at 06/27/23 2101  •  metoprolol (LOPRESSOR) injection 2.5 mg, 2.5 mg, Intravenous, Q6H, Amara Rockwell PA-C, 2.5 mg at 06/29/23 1415  •  naloxone (NARCAN) 0.04 mg/mL syringe 0.04 mg, 0.04 mg, Intravenous, Q1MIN PRN, ODESSA Kirkpatrick  •  nystatin (MYCOSTATIN) powder, , Topical, BID, ODESSA Jacobs, Given at 06/29/23 0935  •  sodium chloride 3 % inhalation solution 4 mL, 4 mL, Nebulization, TID, ODESSA Jacobs, 4 mL at 06/29/23 1428  •  trimethobenzamide (TIGAN) IM injection 200 mg, 200 mg, Intramuscular, Q6H PRN, ODESSA Vera, 200 mg at 06/18/23 1640    Facility-Administered Medications Ordered in Other Encounters:   •  sodium chloride 0.9 % infusion, 75 mL/hr, Intravenous, Continuous, Puma Montez MD, Stopped at 05/26/23 1426    Laboratory Results:  Results from last 7 days   Lab Units 06/29/23  0948 06/29/23  0428 06/28/23 2122 06/28/23  1736 06/28/23  0512 06/27/23  0522 06/26/23  0445 06/25/23  0952 06/24/23  0525 06/23/23  0435   WBC Thousand/uL  --  11.64*  --   --   --  10.08 11.19*  --  10.36* 10.13   HEMOGLOBIN g/dL  --  8.1*  --   --   --  7.6* 8.4*  --  8.1* 7.8*   HEMATOCRIT %  --  27.9*  --   --   --  26.0* 28.9*  --  26.4* 26.7*   PLATELETS Thousands/uL  --  250  --   --   --  205 184  --  136* 110*   SODIUM mmol/L 146 147 146 145 141 143 142   < > 139 140   POTASSIUM mmol/L 5.3 5.5* 5.2 5.6* 4.8 4.5 4.3   < > 3.9 4.0   CHLORIDE mmol/L 112* 112* 113* 112* 111* 110* 113*   < > 108 108   CO2 mmol/L 30 32 31 30 30 30 28   < > 28 27   BUN mg/dL 48* 44* 44* 40* 36* 34* 27*   < > 19 21   CREATININE mg/dL 1.98* 1.87* 1.72* 1.83* 1.53* 1.56* 1.45*   < > 1.63* 1.90*   CALCIUM mg/dL 9.6 8.8 8.3 9.1 8.5 8.4 7.6*   < > 8.3 7.9*   MAGNESIUM mg/dL  --  1.9  --   --  1.7  --   --   --   --   --    PHOSPHORUS mg/dL  --  5.1*  --   --   --   --  2.3  --   --  2.5    < > = values in this interval not displayed. XR chest portable   Final Result by Claudio Tejada MD (06/29 0810)      Moderate right effusion and small left effusion with bibasilar atelectasis. Pneumonia not excluded in the appropriate clinical setting. Workstation performed: [id number]         FL barium swallow video w speech   Final Result by DOROTHY JESUS (06/26 1024)      XR chest portable   Final Result by Claudio Tejada MD (06/24 2685)      Mild pulmonary edema with small effusions and bibasilar atelectasis. Pneumonia not excluded in the appropriate clinical setting.                Workstation performed: [id number]         XR spine lumbar 2 or 3 views injury   Final Result by aDvid Wright, MD (06/25 1432)      The known nondisplaced fracture line at L1 is not well seen radiographically. Otherwise stable alignment. Chronic compression deformity at L4. Workstation performed: [id number]         XR chest portable   Final Result by Janeen Galan DO (06/22 1637)      Enteric tube tip projects over the stomach. Limitations above. Mild central vascular congestion with possible small pleural effusions. Cannot exclude left retrocardiac opacity. Workstation performed: [id number]         CT spine lumbar wo contrast   Final Result by Mona De Dios MD (06/22 0825)      1. Oblique, nondisplaced fracture involving the L1 vertebral body without significant vertebral height loss or retropulsion. 2. Chronic L4 compression fracture with moderate to severe anterior wedging. 3. Multilevel degenerative disc disease with mild to moderate central canal narrowing at the L3-4 level. The study was marked in [facility] for immediate notification. Workstation performed: [id number]         FL barium swallow video w speech   Final Result by DOROTHY JESUS (06/21 1501)      XR spine lumbar minimum 4 views non injury   Final Result by Fern Villarreal MD (06/21 1425)      Limited study without a true lateral image. Unchanged severe L4 compression deformity. Backspace      Progressive loss of height of the L2 vertebral body may represent an acute fracture. Consider CT follow-up. The study was marked in [facility] for immediate notification. Workstation performed: [id number]         IR tunneled dialysis catheter placement   Final Result by Katherine Ventura MD (06/19 1929)      Insertion of right IJV tunneled dialysis catheter, with tip in the expected location of the right atrium. Plan:      The catheter may be used immediately.       Workstation performed: [id number]         XR chest portable ICU   Final Result by Cory Barron MD (06/16 1222)      Left-sided line seen with tip in the SVC right atrial junction   No pneumothorax   Pulmonary congestion with small effusion                     Workstation performed: [id number]         XR chest portable ICU   Final Result by Chucky Og MD (06/15 1605)      Stable mild congestion and small bilateral effusions. Workstation performed: [id number]         CT abdomen pelvis wo contrast   Final Result by Ria Escobedo MD (06/14 4736)      No acute abdominal or pelvic pathology within limitation of a noncontrast study. Small abdominal and pelvic ascites, new from 6/7/2023. Essentially unchanged bilateral perinephric and periureteral fat stranding. No organized fluid collections to suggest an abscess. Small to moderate right pleural effusion, slightly enlarged from 6/7/2023. Multiple stable additional findings as above. Workstation performed: [id number]         XR chest 1 view portable   ED Interpretation by Luiz Garcia DO (06/14 9982)   Wet read - pacemaker, sternotomy wires, increased perihilar markings but no acute pulm edema, effusion or consolidations      Final Result by Della Aburto MD (06/15 1150)      Possible mild congestion and small effusions                  Workstation performed: [id number]         XR spine lumbar 2 or 3 views injury    (Results Pending)   XR chest portable ICU    (Results Pending)       Portions of the record may have been created with voice recognition software. Occasional wrong word or "sound a like" substitutions may have occurred due to the inherent limitations of voice recognition software. Read the chart carefully and recognize, using context, where substitutions have occurred.

## 2023-06-29 NOTE — ASSESSMENT & PLAN NOTE
Assessment:   · 6/26/23 VBS: Decreased labial seal, repetitive and disorganized tongue movement needed to propel bolus with minimal to no clearance of material and majority of bolus remaining on the tongue. Swallow initiation is delayed to the valleculae. Pharyngeal residue observed in the valleculae and pyriforms and on the pharyngeal wall and BOT. Epiglottic inversion is absent. Anterior displacement and laryngeal elevation are reduced. Laryngeal vestibule closure is absent. PES opening is reduced.  Pt presents with multiple attempts to swallow, but only minimal effective swallows    Plan:   · Strict NPO  · Re-address goals of care with family regarding future plan for PEG vs comfort measures only

## 2023-06-29 NOTE — PLAN OF CARE
Problem: METABOLIC, FLUID AND ELECTROLYTES - ADULT  Goal: Electrolytes maintained within normal limits  Description: INTERVENTIONS:  - Monitor labs and assess patient for signs and symptoms of electrolyte imbalances  - Administer electrolyte replacement as ordered  - Monitor response to electrolyte replacements, including repeat lab results as appropriate  - Instruct patient on fluid and nutrition as appropriate  Outcome: Progressing  Goal: Fluid balance maintained  Description: INTERVENTIONS:  - Monitor labs   - Monitor I/O and WT  - Instruct patient on fluid and nutrition as appropriate  - Assess for signs & symptoms of volume excess or deficit  Outcome: Progressing  Goal: Glucose maintained within target range  Description: INTERVENTIONS:  - Monitor Blood Glucose as ordered  - Assess for signs and symptoms of hyperglycemia and hypoglycemia  - Administer ordered medications to maintain glucose within target range  - Assess nutritional intake and initiate nutrition service referral as needed  Outcome: Progressing     Problem: MOBILITY - ADULT  Goal: Maintain or return to baseline ADL function  Description: INTERVENTIONS:  -  Assess patient's ability to carry out ADLs; assess patient's baseline for ADL function and identify physical deficits which impact ability to perform ADLs (bathing, care of mouth/teeth, toileting, grooming, dressing, etc.)  - Assess/evaluate cause of self-care deficits   - Assess range of motion  - Assess patient's mobility; develop plan if impaired  - Assess patient's need for assistive devices and provide as appropriate  - Encourage maximum independence but intervene and supervise when necessary  - Involve family in performance of ADLs  - Assess for home care needs following discharge   - Consider OT consult to assist with ADL evaluation and planning for discharge  - Provide patient education as appropriate  Outcome: Progressing  Goal: Maintains/Returns to pre admission functional level  Description: INTERVENTIONS:  - Perform BMAT or MOVE assessment daily.   - Set and communicate daily mobility goal to care team and patient/family/caregiver. - Collaborate with rehabilitation services on mobility goals if consulted  - Perform Range of Motion 3 times a day. - Reposition patient every 2 hours. - Record patient progress and toleration of activity level   Outcome: Progressing     Problem: Prexisting or High Potential for Compromised Skin Integrity  Goal: Skin integrity is maintained or improved  Description: INTERVENTIONS:  - Identify patients at risk for skin breakdown  - Assess and monitor skin integrity  - Assess and monitor nutrition and hydration status  - Monitor labs   - Assess for incontinence   - Turn and reposition patient  - Assist with mobility/ambulation  - Relieve pressure over bony prominences  - Avoid friction and shearing  - Provide appropriate hygiene as needed including keeping skin clean and dry  - Evaluate need for skin moisturizer/barrier cream  - Collaborate with interdisciplinary team   - Patient/family teaching  - Consider wound care consult   Outcome: Progressing     Problem: Nutrition/Hydration-ADULT  Goal: Nutrient/Hydration intake appropriate for improving, restoring or maintaining nutritional needs  Description: Monitor and assess patient's nutrition/hydration status for malnutrition. Collaborate with interdisciplinary team and initiate plan and interventions as ordered. Monitor patient's weight and dietary intake as ordered or per policy. Utilize nutrition screening tool and intervene as necessary. Determine patient's food preferences and provide high-protein, high-caloric foods as appropriate.      INTERVENTIONS:  - Monitor oral intake, urinary output, labs, and treatment plans  - Assess nutrition and hydration status and recommend course of action  - Evaluate amount of meals eaten  - Assist patient with eating if necessary   - Allow adequate time for meals  - Recommend/ encourage appropriate diets, oral nutritional supplements, and vitamin/mineral supplements  - Order, calculate, and assess calorie counts as needed  - Recommend, monitor, and adjust tube feedings and TPN/PPN based on assessed needs  - Assess need for intravenous fluids  - Provide specific nutrition/hydration education as appropriate  - Include patient/family/caregiver in decisions related to nutrition  Outcome: Progressing     Problem: PAIN - ADULT  Goal: Verbalizes/displays adequate comfort level or baseline comfort level  Description: Interventions:  - Encourage patient to monitor pain and request assistance  - Assess pain using appropriate pain scale  - Administer analgesics based on type and severity of pain and evaluate response  - Implement non-pharmacological measures as appropriate and evaluate response  - Consider cultural and social influences on pain and pain management  - Notify physician/advanced practitioner if interventions unsuccessful or patient reports new pain  Outcome: Progressing     Problem: INFECTION - ADULT  Goal: Absence or prevention of progression during hospitalization  Description: INTERVENTIONS:  - Assess and monitor for signs and symptoms of infection  - Monitor lab/diagnostic results  - Monitor all insertion sites, i.e. indwelling lines, tubes, and drains  - Monitor endotracheal if appropriate and nasal secretions for changes in amount and color  - [place] appropriate cooling/warming therapies per order  - Administer medications as ordered  - Instruct and encourage patient and family to use good hand hygiene technique  - Identify and instruct in appropriate isolation precautions for identified infection/condition  Outcome: Progressing     Problem: SAFETY ADULT  Goal: Maintain or return to baseline ADL function  Description: INTERVENTIONS:  -  Assess patient's ability to carry out ADLs; assess patient's baseline for ADL function and identify physical deficits which impact ability to perform ADLs (bathing, care of mouth/teeth, toileting, grooming, dressing, etc.)  - Assess/evaluate cause of self-care deficits   - Assess range of motion  - Assess patient's mobility; develop plan if impaired  - Assess patient's need for assistive devices and provide as appropriate  - Encourage maximum independence but intervene and supervise when necessary  - Involve family in performance of ADLs  - Assess for home care needs following discharge   - Consider OT consult to assist with ADL evaluation and planning for discharge  - Provide patient education as appropriate  Outcome: Progressing  Goal: Maintains/Returns to pre admission functional level  Description: INTERVENTIONS:  - Perform BMAT or MOVE assessment daily.   - Set and communicate daily mobility goal to care team and patient/family/caregiver. - Collaborate with rehabilitation services on mobility goals if consulted  - Perform Range of Motion 3 times a day. - Reposition patient every 2 hours.   - Record patient progress and toleration of activity level   Outcome: Progressing  Goal: Patient will remain free of falls  Description: INTERVENTIONS:  - Educate patient/family on patient safety including physical limitations  - Instruct patient to call for assistance with activity   - Consult OT/PT to assist with strengthening/mobility   - Keep Call bell within reach  - Keep bed low and locked with side rails adjusted as appropriate  - Keep care items and personal belongings within reach  - Initiate and maintain comfort rounds  - Make Fall Risk Sign visible to staff  - Offer Toileting every 2 Hours, in advance of need  - Initiate/Maintain bed alarm  - Obtain necessary fall risk management equipment  - Apply yellow socks and bracelet for high fall risk patients  - Consider moving patient to room near nurses station  Outcome: Progressing     Problem: DISCHARGE PLANNING  Goal: Discharge to home or other facility with appropriate resources  Description: INTERVENTIONS:  - Identify barriers to discharge w/patient and caregiver  - Arrange for needed discharge resources and transportation as appropriate  - Identify discharge learning needs (meds, wound care, etc.)  - Arrange for interpretive services to assist at discharge as needed  - Refer to Case Management Department for coordinating discharge planning if the patient needs post-hospital services based on physician/advanced practitioner order or complex needs related to functional status, cognitive ability, or social support system  Outcome: Progressing     Problem: Knowledge Deficit  Goal: Patient/family/caregiver demonstrates understanding of disease process, treatment plan, medications, and discharge instructions  Description: Complete learning assessment and assess knowledge base.   Interventions:  - Provide teaching at level of understanding  - Provide teaching via preferred learning methods  Outcome: Progressing     Problem: RESPIRATORY - ADULT  Goal: Achieves optimal ventilation and oxygenation  Description: INTERVENTIONS:  - Assess for changes in respiratory status  - Assess for changes in mentation and behavior  - Position to facilitate oxygenation and minimize respiratory effort  - Oxygen administered by appropriate delivery if ordered  - Initiate smoking cessation education as indicated  - Encourage broncho-pulmonary hygiene including cough, deep breathe, Incentive Spirometry  - Assess the need for suctioning and aspirate as needed  - Assess and instruct to report SOB or any respiratory difficulty  - Respiratory Therapy support as indicated  Outcome: Progressing     Problem: GENITOURINARY - ADULT  Goal: Maintains or returns to baseline urinary function  Description: INTERVENTIONS:  - Assess urinary function  - Encourage oral fluids to ensure adequate hydration if ordered  - Administer IV fluids as ordered to ensure adequate hydration  - Administer ordered medications as needed  - Offer frequent toileting  - Follow urinary retention protocol if ordered  Outcome: Progressing     Problem: MUSCULOSKELETAL - ADULT  Goal: Maintain or return mobility to safest level of function  Description: INTERVENTIONS:  - Assess patient's ability to carry out ADLs; assess patient's baseline for ADL function and identify physical deficits which impact ability to perform ADLs (bathing, care of mouth/teeth, toileting, grooming, dressing, etc.)  - Assess/evaluate cause of self-care deficits   - Assess range of motion  - Assess patient's mobility  - Assess patient's need for assistive devices and provide as appropriate  - Encourage maximum independence but intervene and supervise when necessary  - Involve family in performance of ADLs  - Assess for home care needs following discharge   - Consider OT consult to assist with ADL evaluation and planning for discharge  - Provide patient education as appropriate  Outcome: Progressing

## 2023-06-29 NOTE — ACP (ADVANCE CARE PLANNING)
Critical Care Advanced Care Planning Note  Luz Gavin 81 y.o. male MRN: [id number]  Unit/Bed#: [facility] 280-47 Encounter: [id number]    Luz Gavin is a 81 y.o. male requiring critical care evaluation and advanced care planning. The patient has chronic comorbidities, including but not limited to Hx of Anemia, Arthritis, AF, Basal cell carcinoma, HFpEF, History of VT, CKD (b/l 1.2-1.5 range), Thrombocytopenia, multiple lumbar compression fractures, T2DM, GERD, HTN, HLD, Obesity, which is now further complicated by the following acute conditions: respiratory failure secondary to dysphagia and inability to manage copious oral secretions. Due to the severity of the patient's acute condition and/or the extent of chronic conditions, additional conversations pertaining to advanced care planning were required. Today's discussion, which was held in a face-to-face meeting, included Nahed Clarke (Wife), Luz Gavin (patient), and it was established that all stake holders understood the rationale for the advanced care planning. The patient was able to participate in the discussion although unable to verbally express himself 2/2 vocal cord dysfunction. Summary of Discussion:  - I discussed the challenging situation the patient finds himself in with severe respiratory depression, dysphagia, and repeated aspiration.  - We discussed at length what are the things that the ICU is able to do to try to improve the patient's health. The major items are use of antibiotics (for possible pneumonia) and aggressive diuresis (for possible pulmonary fluid congestion). I expressed that if the 48 hours after his admission to no result in any improvement in his condition that this was suggestive that ongoing efforts may have very limitted success and that transitioning to a focus on comfort management may be appropriate. - The family and patient expressed a desire to mobilize the patient to the chair.  I indicated that this was limited by the requirement to place a LSO brace for his lumbar fractures. They indicated that they would not be willing to wear the brace and that they were willing to accept the possible neurological damage that could occur secondary to not using the brace during mobilization, sitting up, or chest PT.  - I indicated that this is a reasonable risk if they felt it would make the patient feel better. I will place orders to allow the patient to be mobilized and receive chest PT per their wishes. Total time spent, (25) minutes (1305 to 1330).       CODE STATUS: DNR/DNI - Level 3  POA:    POLST:        SIGNATURE: Domenica Leon MD PhD  DATE: June 29, 2023  TIME: 1:31 PM

## 2023-06-29 NOTE — ASSESSMENT & PLAN NOTE
Assessment:   · Patient had a fall on carpet prior to admission and has been having low back pain for approximately 3-4 weeks   · Relevant imaging:   · 6/7/23 CT A/P: Age-indeterminate severe central compression deformity of L4. No retropulsion  · 6/14/23 CT A/P: Unchanged appearance of severe compression deformity of L4 vertebral body with no retropulsion into the spinal canal. No acute fractures  · 6/21/23 XR L-spine: Unchanged severe L4 compression deformity. Progressive loss of height of the L2 vertebral body may represent an acute fracture  · 6/21/23 CT L-spine: Oblique, nondisplaced fracture involving the L1 vertebral body without significant vertebral height loss or retropulsion. Chronic L4 compression fracture with moderate to severe anterior wedging. Multilevel degenerative disc disease with mild to moderate central canal narrowing at the L3-4 level  · 6/23/23 XR L-spine:known nondisplaced fracture line at L1 is not well seen radiographically. Otherwise stable alignment.  Chronic compression deformity at L4    Plan:   · Neurosurgery consulted  · TLSO as tolerated by respiratory status   · HOB < 45 degrees as able to tolerate given tenuous respiratory status   · Analgesic options limited as patient is NPO and any decrease in respiratory drive could lead to failure

## 2023-06-29 NOTE — PROGRESS NOTES
[address] [place] [address]  Progress Note  Name: Jad Andrew  MRN: [id number]  Unit/Bed#: [facility] 133-03 I Date of Admission: 6/14/2023   Date of Service: 6/29/2023 I Hospital Day: 15    Assessment/Plan   * Acute respiratory failure with hypoxia [facility])  Assessment & Plan  Assessment:   · Multifactorial from volume overload, hypoxic vasoconstriction in the setting of pulmonary hypertension, aspiration due to dysphagia, and less likely pneumonia, although very high risk    Plan:   · Wean supplemental oxygen as able to maintain SpO2 > 90%   · Continue high flow nasal cannula   · Xopenex TID   · Chest PT  · Follow up CXR in AM  · Continue pulmonary hygiene. Incentive spirometer q1h while awake, encourage coughing and deep breathing. Upright positioning  · Suction as needed and closely monitor secretions. Maintain HOB >30 degrees. Q4h oral care with chlorhexidine BID        Acute on chronic right-sided congestive heart failure [facility])  Assessment & Plan  Assessment:  · Pre-existing HFpEF approximately 60% with long-standing right heart dysfunction with elevated right heart pressures, now likely worsened from possible acute pneumonia causing increased resistance, volume overload, and vasoconstriction associated with episodes of hypoxia  · Current weight: 135kg   · Relevant echocardiograms:  · 6/12/23 TTE: LV cavity size normal. Wall thickness normal. Moderate concentric hypertrophy. Moderate asymmetric hypertrophy of septal wall. LVEF 70%. Systolic function is normal. Wall motion is normal. Diastolic function is normal. Abnormal septal motion. Diastolic flattening of IV septum consistent with RV volume overload. RV cavity size moderately dilated. Systolic function normal. Mild MR, mild MS. Moderate TR. RVSP severely elevated 73.00 mmHg.     Plan:   · Continue diuresis as tolerated  · Bumex 1 mg/hr  · Daily weights  · Currently NPO, but should maintain Na and fluid restriction     REMA (acute kidney injury) [facility])  Assessment & Plan  Assessment:   · Superimposed on known CKD3. Initial REMA related to septic shock with hypoperfusion which progressed to anuria and requirement of renal replacement threrapy  · Now likely cardiorenal in the setting of RV dysfunction and volume overload   · CRRT 6/14/23 - 6/19/23  · Baseline creatinine: 1.28 - 1.60  · Initial creatinine: 8.70 (6/14/23)   · Last creatinine: 1.72 (6/28/23)     Plan:   · Continue diuresis as detailed above to try and offload RV   · Strict q2h I/O monitoring  · Maintain alfred catheter for now  · Continue to follow renal function tests  · Nephrology consulted, appreciate recommendations    Risk for aspiration pneumonia [facility])  Assessment & Plan  Assessment:  · Patient with near-constant aspiration of oral secretions placing him at high risk for bacterial pneumonia  · Initial presenting sepsis from UTI resolved   · Procalcitonin: 0.42 (6/28/23)  · Cultures:  · 6/14/23 BCXx2: Negative   · 6/14/23 Urine Cx: E. Coli (MRD))  · 6/15/23 MRSA Cx: Negative   · 6/24/23 BCXx2: Negative       Plan:   · Current Antibiotics: Cefepime  · Antibiotic day # 2  · Plan for at least 5-day empiric course given severity of respiratory failure   · Continue to monitor fever and WBC curve         Severe oropharyngeal dysphagia  Assessment & Plan  Assessment:   · 6/26/23 VBS: Decreased labial seal, repetitive and disorganized tongue movement needed to propel bolus with minimal to no clearance of material and majority of bolus remaining on the tongue. Swallow initiation is delayed to the valleculae. Pharyngeal residue observed in the valleculae and pyriforms and on the pharyngeal wall and BOT. Epiglottic inversion is absent. Anterior displacement and laryngeal elevation are reduced. Laryngeal vestibule closure is absent. PES opening is reduced.  Pt presents with multiple attempts to swallow, but only minimal effective swallows    Plan:   · Strict NPO  · Re-address goals of care with family regarding future plan for PEG vs comfort measures only     Paroxysmal atrial fibrillation [facility])  Assessment & Plan  Assessment:   · Patient primarily V-paced or NSR this admission with conversion back to atrial fibrillation on 6/28/23  · Rates becoming more elevated, likely in response to hypoxia, increased WOB    Plan:  · Goal HR < 120  · Home Sotalol 80mg daily on hold while NPO  · Closely monitor if resumed given REMA  · Start metoprolol 5mg IV q6h today as tolerated by BP  · Systemic anticoagulation currently on hold  · On home warfarin   · Optimize electrolytes: K > 4.0, Mag > 2.0      Acute on chronic anemia  Assessment & Plan  Assessment:   · Patient with chronic macrocytic anemia likely due to multiple hospitalizations requiring frequent phlebotomy, new severe REMA suppressing RBC production, likely some degree of iron deficiency, loss of blood in CRRT   · Baseline hemoglobin: 9.1 - 7.1  · Initial hemoglobin: 9.4 (6/14/23)  · Last hemoglobin: 7.6 (6/27/23)  · 6/17/23 1u PRBC  · 6/20/23 1u PRBC      Plan:   · Transfuse for hemoglobin <7.0  · CBC in AM    Type 2 diabetes mellitus, without long-term current use of insulin ([facility])  Assessment & Plan  Assessment:   · Last hemoglobin A1c 5.0% on 6/15/23    Plan:   · Continue sliding scale regimen q6h  · Lantus 10 units qHS   · Adjust regimen as needed to maintain -180   · NPO for now  · Holding home oral antihyperglycemics:  · Metformin 1000mg daily     Acute metabolic encephalopathy  Assessment & Plan  Assessment:   · Related to delirium, hypoxia, overall large clinical decline  · Patient still appropriately answers questions, but more lethargic     Plan:   · Continue frequent neuro checks     · Sleep/wake cycle regulation as able   · Melatonin 6mg qHS  · On hold while NPO  · CAM-ICU BID    Acute low back pain  Assessment & Plan  Assessment:   · Patient had a fall on carpet prior to admission and has been having low back pain for approximately 3-4 weeks · Relevant imaging:   · 6/7/23 CT A/P: Age-indeterminate severe central compression deformity of L4. No retropulsion  · 6/14/23 CT A/P: Unchanged appearance of severe compression deformity of L4 vertebral body with no retropulsion into the spinal canal. No acute fractures  · 6/21/23 XR L-spine: Unchanged severe L4 compression deformity. Progressive loss of height of the L2 vertebral body may represent an acute fracture  · 6/21/23 CT L-spine: Oblique, nondisplaced fracture involving the L1 vertebral body without significant vertebral height loss or retropulsion. Chronic L4 compression fracture with moderate to severe anterior wedging. Multilevel degenerative disc disease with mild to moderate central canal narrowing at the L3-4 level  · 6/23/23 XR L-spine:known nondisplaced fracture line at L1 is not well seen radiographically. Otherwise stable alignment.  Chronic compression deformity at L4    Plan:   · Neurosurgery consulted  · TLSO as tolerated by respiratory status   · HOB < 45 degrees as able to tolerate given tenuous respiratory status   · Analgesic options limited as patient is NPO and any decrease in respiratory drive could lead to failure     CKD (chronic kidney disease) stage 3, GFR 30-59 ml/min ([facility])  Assessment & Plan  Assessment:     Plan:   See plan above for REMA    Hyperlipidemia  Assessment & Plan  Assessment:   · 6/22/23 Lipid panel:   · Total cholesterol: 111  · Triglyceride: 125  · HDL: 34  · LDL: 56     Plan:   · Holding home atorvastatin 40mg qHS        Prolonged QT interval  Assessment & Plan  Assessment:  · Likely secondary to long-term sotalol use   · S/p Dual chamber ICD     Plan:   · Continue to monitor EKG and consider holding if QTc exceeds 450ms  · Optimize electrolytes     History of ventricular tachycardia  Assessment & Plan  Assessment:  · S/p Dual chamber ICD (12/3/2015)    Plan:  · Monitor telemetry   · Optimize electrolytes: K > 4.0, Mag > 2.0    Acidosis  Assessment & Plan  Metabolic acidosis in the setting of REMA and lactic acidosis, potentially 2/2 to metformin intoxication vs acute infection. Plan:  · Holding metformin  · Metformin level sent out, pending. · Antibiotics as above    Septic shock (HCC)-resolved as of 6/29/2023  Assessment & Plan  2/2 MDR E.coli urosepsis. Briefly required ICU stay and pressor support. Currently off pressors. Abx day 6, s/p 4 days of cefepime, currently day 2 of ceftriaxone. Plan:  · Continue ceftriaxone for a total abx duration of 7 days. Thrombocytopenia (HCC)-resolved as of 6/29/2023  Assessment & Plan  Admission platelets of 099. Steady downtrend this admission, most recent platelet count of 35. One instance of melena documented on 6/16 but at the time patient had platelete count in the 120k-130k. Unclear eitiology but likely 2/2 acute sepsis vs CVVH and filter clotting. Patient did receive heparin intermittently this admission however 4T score of 3 and platelets have not responded to discontinuation of heparin products. Possibly cephalosporin induced, but patient is s/p 4 days of cefepime and then transitioned to ceftriaxone. Plan:  · Likely 2/2 sepsis  · No acute signs of bleeding on exam  · S/p 1 U platelets 9/33 in preparation for IR permacath placement  · Trend CBC  · Holding pharmacologic AC/DVT prophylaxis             ICU Core Measures     A: Assess, Prevent, and Manage Pain · Has pain been assessed? Yes  · Need for changes to pain regimen? Yes   B: Both SAT/SAT  · N/A   C: Choice of Sedation · RASS Goal: +1 Restless or N/A patient not on sedation  · Need for changes to sedation or analgesia regimen? Yes   D: Delirium · CAM-ICU: Negative   E: Early Mobility  · Plan for early mobility? No   F: Family Engagement · Plan for family engagement today? Yes       Antibiotic Review: Continue broad spectrum secondary to severity of illness. Review of Invasive Devices:     Garret Plan: Continue for accurate I/O monitoring for 48 hours        Prophylaxis:  VTE VTE covered by:  heparin (porcine), Subcutaneous, 5,000 Units at 06/28/23 2136       Stress Ulcer  not ordered       Subjective   HPI/24hr events: See consult note from 6/28/23. Patient with response to bumex drip and ongoing negative fluid balance. No change in oxygenation. Normal BP with worsening tachycardia/atrial fib. Ongoing respiratory distress and gurgling respirations despite suctioning, glycopyrrolate, and pulm toilet. Review of Systems   Constitutional: Positive for fatigue. HENT: Positive for trouble swallowing and voice change. Respiratory: Positive for cough and shortness of breath. Cardiovascular: Negative for chest pain. Musculoskeletal: Positive for back pain. Objective                            Vitals I/O      Most Recent Min/Max in 24hrs   Temp (!) 96.7 °F (35.9 °C) Temp  Min: 96.7 °F (35.9 °C)  Max: 98.7 °F (37.1 °C)   Pulse 104 Pulse  Min: 86  Max: 115   Resp (!) 48 Resp  Min: 19  Max: 48   /71 BP  Min: 92/64  Max: 151/71   O2 Sat 94 % SpO2  Min: 89 %  Max: 100 %      Intake/Output Summary (Last 24 hours) at 6/29/2023 0442  Last data filed at 6/29/2023 0401  Gross per 24 hour   Intake 149.27 ml   Output 1740 ml   Net -1590.73 ml         Diet NPO     Invasive Monitoring Physical exam    Physical Exam  Vitals reviewed. Constitutional:       Appearance: He is toxic-appearing and diaphoretic. Interventions: Nasal cannula in place. HENT:      Head: Normocephalic and atraumatic. Mouth/Throat:      Mouth: Mucous membranes are dry. Eyes:      Conjunctiva/sclera: Conjunctivae normal.      Pupils: Pupils are equal, round, and reactive to light. Cardiovascular:      Rate and Rhythm: Tachycardia present. Rhythm irregularly irregular. Pulses:           Radial pulses are 1+ on the right side and 1+ on the left side. Dorsalis pedis pulses are 1+ on the right side and 1+ on the left side.       Heart sounds: Normal heart sounds. Comments: 3+ BUE edema   Pulmonary:      Effort: Tachypnea and accessory muscle usage present. Breath sounds: Examination of the right-lower field reveals decreased breath sounds. Examination of the left-lower field reveals decreased breath sounds. Decreased breath sounds, wheezing and rhonchi present. Comments: Course upper airway crackles   Abdominal:      General: Abdomen is protuberant. There is no distension. Palpations: Abdomen is soft. Tenderness: There is no abdominal tenderness. Genitourinary:     Penis: Swelling present. Comments: Combs: + clear urine   Musculoskeletal:      Right lower le+ Edema present. Left lower le+ Edema present. Skin:     General: Skin is warm. Capillary Refill: Capillary refill takes less than 2 seconds. Neurological:      General: No focal deficit present. Mental Status: He is lethargic. GCS: GCS eye subscore is 4. GCS verbal subscore is 5. GCS motor subscore is 6. Motor: Weakness present. Comments: BUE 4/5  BLE 2/5              Diagnostic Studies      EKG: Atrial fib  Imaging: No new imaging  I have personally reviewed pertinent reports.    and I have personally reviewed pertinent films in PACS     Medications:  Scheduled PRN   acetaminophen, 650 mg, Q6H  bisacodyl, 10 mg, Daily  cefepime, 1,000 mg, Q12H  gabapentin, 300 mg, HS  heparin (porcine), 5,000 Units, Q8H [address]  insulin glargine, 10 Units, HS  insulin lispro, 2-12 Units, Q6H  levalbuterol, 1.25 mg, TID  lidocaine, 1 patch, Daily  melatonin, 6 mg, HS  nystatin, , BID  sodium chloride, 4 mL, TID  sotalol, 80 mg, Daily      albuterol, 2.5 mg, Q4H PRN  diphenhydramine, lidocaine, Al/Mg hydroxide, simethicone, 10 mL, Q6H PRN  HYDROmorphone, 0.5 mg, Q4H PRN  HYDROmorphone, 0.2 mg, Q2H PRN   Or  HYDROmorphone, 0.5 mg, Q2H PRN  lidocaine, , 4x Daily PRN  naloxone, 0.04 mg, Q1MIN PRN  oxyCODONE, 5 mg, Q4H PRN   Or  oxyCODONE, 7.5 mg, Q4H PRN  trimethobenzamide, 200 mg, Q6H PRN       Continuous    bumetanide (BUMEX) 12.5 mg infusion 50 mL, 1 mg/hr, Last Rate: 1 mg/hr (06/28/23 0465)         Labs:    CBC    Recent Labs     06/27/23  0522   WBC 10.08   HGB 7.6*   HCT 26.0*        BMP    Recent Labs     06/28/23 1736 06/28/23 2122   SODIUM 145 146   K 5.6* 5.2   * 113*   CO2 30 31   AGAP 3 2   BUN 40* 44*   CREATININE 1.83* 1.72*   CALCIUM 9.1 8.3       Coags    Recent Labs     06/28/23  0512   INR 1.17        Additional Electrolytes  Recent Labs     06/28/23  0512   MG 1.7          Blood Gas    No recent results  No recent results LFTs  No recent results    Infectious  Recent Labs     06/28/23 2122   PROCALCITONI 0.42*     Glucose  Recent Labs     06/27/23  0522 06/28/23  0512 06/28/23 1736 06/28/23 2122   GLUC 151* 131 125 [facility]

## 2023-06-29 NOTE — ASSESSMENT & PLAN NOTE
Assessment:  · Patient with near-constant aspiration of oral secretions placing him at high risk for bacterial pneumonia  · Initial presenting sepsis from UTI resolved   · Procalcitonin: 0.42 (6/28/23)  · Cultures:  · 6/14/23 BCXx2: Negative   · 6/14/23 Urine Cx: E. Coli (MRD))  · 6/15/23 MRSA Cx: Negative   · 6/24/23 BCXx2: Negative       Plan:   · Current Antibiotics: Cefepime  · Antibiotic day # 2  · Plan for at least 5-day empiric course given severity of respiratory failure   · Continue to monitor fever and WBC curve

## 2023-06-30 PROBLEM — Z51.5 COMFORT MEASURES ONLY STATUS: Status: ACTIVE | Noted: 2023-01-01

## 2023-06-30 NOTE — ASSESSMENT & PLAN NOTE
Assessment:   · Patient had a fall on carpet prior to admission and has been having low back pain for approximately 3-4 weeks   · Relevant imaging:   · 6/7/23 CT A/P: Age-indeterminate severe central compression deformity of L4. No retropulsion  · 6/14/23 CT A/P: Unchanged appearance of severe compression deformity of L4 vertebral body with no retropulsion into the spinal canal. No acute fractures  · 6/21/23 XR L-spine: Unchanged severe L4 compression deformity. Progressive loss of height of the L2 vertebral body may represent an acute fracture  · 6/21/23 CT L-spine: Oblique, nondisplaced fracture involving the L1 vertebral body without significant vertebral height loss or retropulsion. Chronic L4 compression fracture with moderate to severe anterior wedging. Multilevel degenerative disc disease with mild to moderate central canal narrowing at the L3-4 level  · 6/23/23 XR L-spine:known nondisplaced fracture line at L1 is not well seen radiographically. Otherwise stable alignment.  Chronic compression deformity at L4    Plan:   · Comfort measures only

## 2023-06-30 NOTE — QUICK NOTE
6/30/2023 10:01 AM -  Autumn Mj Villarreal's chart and case were reviewed by Tawny Lafleur, [address] [place] [address]. Mode of review included electronic chart check, and brief patient check in. Per review, symptoms remain controlled on current regimen and no changes are made at this time. Please continue the regimen in place, and review our last note for details. For dispo plan, please review Case Management notes. Patient is not appropriate for outpatient follow-up. We will make arrangements through our office. For urgent issues or any questions/concerns, please notify on-call provider via [phone]. You may also call our answering service 24/7 at [phone]. ODESSA Sanchez  Palliative and Supportive Care  Clinic/Answering Service: [phone]  You can find me on Renae!

## 2023-06-30 NOTE — PROGRESS NOTES
Critical Care Interval Transfer Note:    Please refer to progress note from earlier today for full details. · Patient is Comfort measures only. Hospice consulted. Consults: IP CONSULT TO CASE MANAGEMENT  IP CONSULT TO ELECTROPHYSIOLOGY  IP CONSULT TO PHARMACY  IP CONSULT TO NEPHROLOGY  IP CONSULT TO GASTROENTEROLOGY  INPATIENT CONSULT TO IR  IP CONSULT TO CASE MANAGEMENT  IP CONSULT TO NEUROSURGERY  IP CONSULT TO NUTRITION SERVICES  IP CONSULT TO PALLIATIVE CARE  IP CONSULT TO GERONTOLOGY  INPATIENT CONSULT TO IR  IP CONSULT  [facility]       Discharge Plan: Comfort care. Combs Plan: end of life care    PT Recommendations: Post acute rehabilitation services  OT Recommendations: Post acute rehabilitation services      Patient seen and evaluated by Critical Care today and deemed to be appropriate for transfer to Med Surg. Spoke to Dr Pratima Dickinson from [address] [place] [address] Drive to accept transfer. Critical care can be contacted via [phone] with any questions or concerns.

## 2023-06-30 NOTE — ASSESSMENT & PLAN NOTE
Assessment:   · Multifactorial from volume overload, hypoxic vasoconstriction in the setting of pulmonary hypertension, aspiration due to dysphagia, and less likely pneumonia, although very high risk    Plan:   · Transitioned to comfort measures only

## 2023-06-30 NOTE — ASSESSMENT & PLAN NOTE
Assessment:  · Pre-existing HFpEF approximately 60% with long-standing right heart dysfunction with elevated right heart pressures, now likely worsened from possible acute pneumonia causing increased resistance, volume overload, and vasoconstriction associated with episodes of hypoxia  · Current weight: 135kg   · Relevant echocardiograms:  · 6/12/23 TTE: LV cavity size normal. Wall thickness normal. Moderate concentric hypertrophy. Moderate asymmetric hypertrophy of septal wall. LVEF 70%. Systolic function is normal. Wall motion is normal. Diastolic function is normal. Abnormal septal motion. Diastolic flattening of IV septum consistent with RV volume overload. RV cavity size moderately dilated. Systolic function normal. Mild MR, mild MS. Moderate TR. RVSP severely elevated 73.00 mmHg.     Plan:   · Transitioned to comfort measures only

## 2023-06-30 NOTE — PROGRESS NOTES
[address] [facility]  Progress Note  Name: Bruce Courtney  MRN: [id number]  Unit/Bed#: [facility] 933-31 I Date of Admission: 6/14/2023   Date of Service: 6/30/2023 I Hospital Day: 16    Assessment/Plan   Comfort measures only status  Assessment & Plan  · Transitioned to comfort care 6/29  · Continue dilaudid gtt  · Prn dilaudid, haldol, ativan   · Hospice consult today    * Acute respiratory failure with hypoxia (HCC)  Assessment & Plan  Assessment:   · Multifactorial from volume overload, hypoxic vasoconstriction in the setting of pulmonary hypertension, aspiration due to dysphagia, and less likely pneumonia, although very high risk    Plan:   · Transitioned to comfort measures only         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 Wall thickness normal. Moderate concentric hypertrophy. Moderate asymmetric hypertrophy of septal wall. LVEF 70%. Systolic function is normal. Wall motion is normal. Diastolic function is normal. Abnormal septal motion. Diastolic flattening of IV septum consistent with RV volume overload. RV cavity size moderately dilated. Systolic function normal. Mild MR, mild MS. Moderate TR. RVSP severely elevated 73.00 mmHg.     Plan:   · Transitioned to comfort measures only     Acute metabolic encephalopathy  Assessment & Plan  Assessment:   · Related to delirium, hypoxia, overall large clinical decline  · Patient still appropriately answers questions, but more lethargic     Plan:   · Continue frequent neuro checks     · Sleep/wake cycle regulation as able · Melatonin 6mg qHS  · On hold while NPO  · CAM-ICU BID    Acute low back pain  Assessment & Plan  Assessment:   · Patient had a fall on carpet prior to admission and has been having low back pain for approximately 3-4 weeks   · Relevant imaging:   · 6/7/23 CT A/P: Age-indeterminate severe central compression deformity of L4. No retropulsion  · 6/14/23 CT A/P: Unchanged appearance of severe compression deformity of L4 vertebral body with no retropulsion into the spinal canal. No acute fractures  · 6/21/23 XR L-spine: Unchanged severe L4 compression deformity. Progressive loss of height of the L2 vertebral body may represent an acute fracture  · 6/21/23 CT L-spine: Oblique, nondisplaced fracture involving the L1 vertebral body without significant vertebral height loss or retropulsion. Chronic L4 compression fracture with moderate to severe anterior wedging. Multilevel degenerative disc disease with mild to moderate central canal narrowing at the L3-4 level  · 6/23/23 XR L-spine:known nondisplaced fracture line at L1 is not well seen radiographically. Otherwise stable alignment. Chronic compression deformity at L4    Plan:   · Comfort measures only             ICU Core Measures     A: Assess, Prevent, and Manage Pain · Has pain been assessed? Yes  · Need for changes to pain regimen? No   B: Both SAT/SAT  · N/A   C: Choice of Sedation · RASS Goal: N/A patient not on sedation  · Need for changes to sedation or analgesia regimen? NA   D: Delirium · CAM-ICU: Unable to perform secondary to Acute cognitive dysfunction   E: Early Mobility  · Plan for early mobility? No   F: Family Engagement · Plan for family engagement today? Yes       Review of Invasive Devices: Garret Plan: end of life care        Prophylaxis:  VTE    Stress Ulcer  not ordered       Subjective   HPI/24hr events: Transitioned to comfort measures only. No acute overnight events.            Objective                            Vitals I/O      Most Recent Min/Max in 24hrs   Temp (!) 101 °F (38.3 °C) Temp  Min: 98.6 °F (37 °C)  Max: 101 °F (38.3 °C)   Pulse 100 Pulse  Min: 100  Max: 113   Resp (!) 40 Resp  Min: 26  Max: 50   /59 BP  Min: 91/61  Max: 130/63   O2 Sat (!) 87 % SpO2  Min: 87 %  Max: 99 %      Intake/Output Summary (Last 24 hours) at 6/30/2023 0507  Last data filed at 6/29/2023 2100  Gross per 24 hour   Intake 182.22 ml   Output 950 ml   Net -767.78 ml         Diet Regular; Pleasure Feed     Invasive Monitoring Physical exam    Physical Exam  Vitals and nursing note reviewed. Constitutional:       Comments: Resting comfortably   Pulmonary:      Comments: Audible rhonchi           Diagnostic Studies        Imaging:  I have personally reviewed pertinent reports.        Medications:  Scheduled PRN      glycopyrrolate, 0.1 mg, Q4H PRN  haloperidol lactate, 0.5 mg, Q2H PRN  HYDROmorphone, 0.5 mg, Q2H PRN  lidocaine, , 4x Daily PRN  LORazepam, 1 mg, Q10 Min PRN  trimethobenzamide, 200 mg, Q6H PRN       Continuous    HYDROmorphone, 0.5 mg/hr, Last Rate: 0.5 mg/hr (06/29/23 1726)         Labs:    CBC    Recent Labs     06/29/23 0428   WBC 11.64*   HGB 8.1*   HCT 27.9*        BMP    Recent Labs     06/29/23 0428 06/29/23  0948   SODIUM 147 146   K 5.5* 5.3   * 112*   CO2 32 30   AGAP 3 4   BUN 44* 48*   CREATININE 1.87* 1.98*   CALCIUM 8.8 9.6       Coags    Recent Labs     06/28/23  0512   INR 1.17        Additional Electrolytes  Recent Labs     06/28/23 0512 06/29/23  0428   MG 1.7 1.9   PHOS  --  5.1*   CAIONIZED  --  0.93*          Blood Gas    No recent results  No recent results LFTs  No recent results    Infectious  Recent Labs     06/28/23 2122 06/29/23 0428   PROCALCITONI 0.42* 0.46*     Glucose  Recent Labs     06/28/23  1736 06/28/23 2122 06/29/23  0428 06/29/23  0948   GLUC 139 126 116 119                   [address]

## 2023-06-30 NOTE — DEATH NOTE
INPATIENT DEATH NOTE  Monica Fox 81 y.o. male MRN: [id number]  [facility] Encounter: [id number]    Date, Time and Cause of Death    Date of Death: 23  Time of Death: 12:30 PM  Preliminary Cause of Death: Acute respiratory failure with hypercapnia ([address])  Entered by: Andrea pickering[RB1.1]     Attribution     RB1.1 Raulito Mendoza DO 23 13:50           Patient's Information  Pronounced by: Dr Pratima Dickinson  Did the patient's death occur in the ED?: No  Did the patient's death occur in the OR?: No  Did the patient's death occur within 24 hours of admission?: No  Was code status DNR at the time of death?: Yes    PHYSICAL EXAM:  Unresponsive to noxious stimuli, Spontaneous respirations absent, Breath sounds absent, Carotid pulse absent, Heart sounds absent and Pupillary light reflex absent    Medical Examiner notification criteria:  NONE APPLICABLE   Medical Examiner's office notified?:  No, does not meet ME notification criteria   Medical Examiner accepted case?:  No  Name of Medical Examiner:     Family Notification  Was the family notified?: Yes  Date Notified: 23  Time Notified: 9342  Notified by: Raulito Mendoza   Relationship to Patient: Spouse, Daughter  Family Notification Route:  At bedside  Was the family told to contact a  home?: Yes    Autopsy Options:  Post-mortem examination declined by next of kin    Primary Service Attending Physician notified?:  yes - Attending:  No att. providers found Raulito Mendoza D.O    Physician/Resident responsible for completing Discharge Summary:  Raulito Mendoza

## 2023-07-02 NOTE — ASSESSMENT & PLAN NOTE
Patient has been transitioned to comfort care after discussion with the intensive critical care providers and his family

## 2023-07-02 NOTE — DISCHARGE SUMMARY
[address] [facility]  Discharge- Arnaud Coronel 1935, 81 y.o. male MRN: [id number]  Unit/Bed#: [facility] 612-01 Encounter: [id number]  Primary Care Provider: Daya Jalloh DO   Date and time admitted to hospital: 6/14/2023  4:07 PM    Comfort measures only status  Assessment & Plan  Patient has been transitioned to comfort care after discussion with the intensive critical care providers and his family      CKD (chronic kidney disease) stage 3, GFR 30-59 ml/min ([facility])  Assessment & Plan  Lab Results   Component Value Date    EGFR 29 06/29/2023    EGFR 31 06/29/2023    EGFR 34 06/28/2023    CREATININE 1.98 (H) 06/29/2023    CREATININE 1.87 (H) 06/29/2023    CREATININE 1.72 (H) 06/28/2023           Medical Problems     Resolved Problems  Date Reviewed: 7/2/2023          Resolved    Hypomagnesemia 6/18/2023     Resolved by  Saintclair Pare, CRNP    Hyperkalemia 6/18/2023     Resolved by  Saintclair Pare, CRNP    Thrombocytopenia ([address]) 6/29/2023     Resolved by  Saintclair Pare, CRNP    Septic shock ([address]) 6/29/2023     Resolved by  Saintclair Pare, CRNP    Fever 6/29/2023     Resolved by  Saintclair Pare, CRNP        Discharging Physician / Practitioner: Ignacio Coronel DO  PCP: Daya Jalloh DO  Admission Date:   Admission Orders (From admission, onward)     Ordered        06/14/23 2032  INPATIENT ADMISSION  Once                      Discharge Date: 6/30/23  Consultations During Hospital Stay:  · Geriatrics  · Palliative care  · Gastroenterology  · Neurosurgery  · Electrophysiology  · Nephrology    Procedures Performed:   ·     Significant Findings / Test Results:   XR chest portable ICU   Final Result by Natalia Ann MD (06/29 5179)      Progression of the right pleural effusion and airspace disease, with near white out of the right hemithorax. No other significant changes. The study was marked in [facility] for immediate notification.                   Workstation performed: [id number]         XR chest portable   Final Result by Jane Trujillo MD (06/29 0810)      Moderate right effusion and small left effusion with bibasilar atelectasis. Pneumonia not excluded in the appropriate clinical setting. Workstation performed: [id number]         FL barium swallow video w speech   Final Result by SYSTEMGENERAELDON, DOCUMENTATION (06/26 1024)      XR chest portable   Final Result by Jane Trujillo MD (06/24 1909)      Mild pulmonary edema with small effusions and bibasilar atelectasis. Pneumonia not excluded in the appropriate clinical setting. Workstation performed: [id number]         XR spine lumbar 2 or 3 views injury   Final Result by Sherice Hairston MD (06/25 1432)      The known nondisplaced fracture line at L1 is not well seen radiographically. Otherwise stable alignment. Chronic compression deformity at L4. Workstation performed: [id number]         XR chest portable   Final Result by Ángel Rankin DO (06/22 1637)      Enteric tube tip projects over the stomach. Limitations above. Mild central vascular congestion with possible small pleural effusions. Cannot exclude left retrocardiac opacity. Workstation performed: [id number]         CT spine lumbar wo contrast   Final Result by Sara Patrick MD (06/22 1285)      1. Oblique, nondisplaced fracture involving the L1 vertebral body without significant vertebral height loss or retropulsion. 2. Chronic L4 compression fracture with moderate to severe anterior wedging. 3. Multilevel degenerative disc disease with mild to moderate central canal narrowing at the L3-4 level. The study was marked in [facility] for immediate notification.          Workstation performed: [id number]         FL barium swallow video w speech   Final Result by SYSTEMGENERATED, DOCUMENTATION (06/21 1501)      XR spine lumbar minimum 4 views non injury   Final Result by Daryle Sleek, MD (06/21 1445)      Limited study without a true lateral image. Unchanged severe L4 compression deformity. Backspace      Progressive loss of height of the L2 vertebral body may represent an acute fracture. Consider CT follow-up. The study was marked in [facility] for immediate notification. Workstation performed: [id number]         IR tunneled dialysis catheter placement   Final Result by Franki Castañeda MD (06/19 1929)      Insertion of right IJV tunneled dialysis catheter, with tip in the expected location of the right atrium. Plan:      The catheter may be used immediately. Workstation performed: [id number]         XR chest portable ICU   Final Result by Ravindra Macias MD (06/16 1222)      Left-sided line seen with tip in the SVC right atrial junction   No pneumothorax   Pulmonary congestion with small effusion                     Workstation performed: [id number]         XR chest portable ICU   Final Result by Adam Condon MD (06/15 1605)      Stable mild congestion and small bilateral effusions. Workstation performed: [id number]         CT abdomen pelvis wo contrast   Final Result by Bonny Glover MD (06/14 2149)      No acute abdominal or pelvic pathology within limitation of a noncontrast study. Small abdominal and pelvic ascites, new from 6/7/2023. Essentially unchanged bilateral perinephric and periureteral fat stranding. No organized fluid collections to suggest an abscess. Small to moderate right pleural effusion, slightly enlarged from 6/7/2023. Multiple stable additional findings as above.                   Workstation performed: [id number]         XR chest 1 view portable   ED Interpretation by Marii Cobos DO (06/14 1954)   Wet read - pacemaker, sternotomy wires, increased perihilar markings but no acute pulm edema, effusion or consolidations      Final Result by Sourav Baires MD (06/15 6320) Possible mild congestion and small effusions                  Workstation performed: [id number]         ·     Incidental Findings:   ·    · I reviewed the above mentioned incidental findings with the patient and/or family and they expressed understanding. Test Results Pending at Discharge (will require follow up):   ·      Outpatient Tests Requested:  ·     Complications:      Reason for Admission: urosepsis    Hospital Course: Fabian Bingahm is a 81 y.o. male patient who originally presented to the hospital on 2023 due to urosepsis requiring admission to the ICU secondary to septic shock. His course was complicated by septic shock, acute on chronic renal failure, acute respiratory failure. Patient initially improved and was transferred to the medical surgical floor, he was pending PEG tube placement on  when it was noted that he was in respiratory distress, rapid response was called and patient was transferred to the ICU for acute respiratory failure. For goals of care discussion patient was transitioned to comfort care and  on     Please see above list of diagnoses and related plan for additional information. Condition at Discharge: stable    Discharge Day Visit / Exam:   Subjective:    Vitals: Blood Pressure: 109/59 (23 1900)  Pulse: 97 (23 0600)  Temperature: (!) 103.1 °F (39.5 °C) (provider aware, pt comfortable) (23 06)  Temp Source: Rectal (23 1200)  Respirations: 19 (23 06)  Height: 6' 1" (185.4 cm) (06/15/23 1034)  Weight - Scale: 135 kg (297 lb 9.9 oz) (23 06)  SpO2: 94 % (23 06)  Exam:   Physical Exam  Constitutional:       Comments: No pupillary reflex   Cardiovascular:      Comments: No pulses  Pulmonary:      Comments: No breath sounds         Discussion with Family: Updated  (significant other) at bedside.     Discharge instructions/Information to patient and family:   See after visit summary for information provided to patient and family. Provisions for Follow-Up Care:  See after visit summary for information related to follow-up care and any pertinent home health orders. Disposition:   Other:     Planned Readmission: no     Discharge Statement:  I spent 35 minutes discharging the patient. This time was spent on the day of discharge. I had direct contact with the patient on the day of discharge. Greater than 50% of the total time was spent examining patient, answering all patient questions, arranging and discussing plan of care with patient as well as directly providing post-discharge instructions. Additional time then spent on discharge activities. Discharge Medications:  See after visit summary for reconciled discharge medications provided to patient and/or family.       **Please Note: This note may have been constructed using a voice recognition system**

## 2023-07-02 NOTE — ASSESSMENT & PLAN NOTE
Lab Results   Component Value Date    EGFR 29 06/29/2023    EGFR 31 06/29/2023    EGFR 34 06/28/2023    CREATININE 1.98 (H) 06/29/2023    CREATININE 1.87 (H) 06/29/2023    CREATININE 1.72 (H) 06/28/2023

## 2023-07-18 ENCOUNTER — ANTICOAG VISIT (OUTPATIENT)
Dept: CARDIOLOGY CLINIC | Facility: CLINIC | Age: 88
End: 2023-07-18

## 2024-03-20 NOTE — ASSESSMENT & PLAN NOTE
Problem: Discharge Planning  Goal: Discharge to home or other facility with appropriate resources  Outcome: Progressing  Flowsheets (Taken 3/20/2024 0210)  Discharge to home or other facility with appropriate resources: Identify barriers to discharge with patient and caregiver      Lab Results   Component Value Date    HGBA1C 6 3 (H) 03/01/2022       · Hold Metformin,   · SSI q 6 while NPO  · Fingersticks q 6  · Hypoglycemia protocol in place

## 2024-04-02 NOTE — ASSESSMENT & PLAN NOTE
Hi,   Tentatively scheduling patient for Monday 4/22 at [place]. DO NOT CALL PATIENT - will be calling patient next week Wednesday to confirm if he indeed wants to proceed.   Thanks raphael Patient downgraded from critical care, admitted for septic shock secondary to UTI leading to REMA requiring dialysis.  Cr improving  · Likely UTI was the initial cause of REMA, exacerbated by concurrent metformin a  · Combs currently in place as pt noted to have urinary incontinence w/ skin breakdown,  · CVVH discontinued 6/19  · tunneled dialysis catheter placed on 6/19, on intermittent hemodialysis , status post removal on 6/28  · Nephrology following, continue with IV lasix 40 mg daily   · Renal function improving

## 2024-07-21 NOTE — SEDATION DOCUMENTATION
Cholecystostomy tube check completed  Converted to drainage bag with 5ml flush daily  Education provided with additional supplies  Transported to [facility] via wheelchair  No care due was identified.  [facility] Embedded Care Due Messages. Reference number: [id number].   7/21/2024 1:29:53 AM CDT

## 2024-07-25 NOTE — PLAN OF CARE
AAOx3, afebrile, c/o mild incisional pain. Scheduled tylenol given. Telemetry monitor in place (NSR). PRN melatonin given for sleep. Bg monitored q4h and tx per orders. Endocrine is following. Becerra in place with light pink urine. Becerra care done. Urine output monitored q1h. I=O. NS and D51/2 NS are infusing per orders. See flowsheets for urine outpt. KAVIN Lu stated to notify her if urine drops below 100cc/hr. Cr stable. Pt having bladder spasms-ditropan ordered PRN. Old PD cath sites with gauze CDI. RLQ incision had surgical dressing with minimal scant drainage at the beginning of the shift. Pt started to bleed through island dressing. PA at bedside and removed surgical dressing. Pressure dressing with abd pad applied. CBC stable. VSS. Vitals monitored q2h. Pulse ox continuous. Pt is on 2L while awake and home CPAP while sleeping. K was 5.0 @2232. X1 dose of 100 mg IV lasix given. Po sodium bicarb started. PA stated next lab check will be with routine am labs. ID consulted for salivary gland infection that pt was abx on at home. IV Unasyn ordered q8h. Plan for Thymo #2 today. Taught pt how to use IS. Encouraging pt to drink. Pt has hypoactive bm. Scheduled colace and dulcolax given. Self meds set and ready to be taught. GAIL/SCD on. Daughter at the bedside. Pt in lowest position, side rails up x2, non-skid foot wear in place, call light within reach, pt verbalized understanding to call RN when needed. Hand hygiene practiced per protocol. Will continue to monitor.         Problem: Potential for Falls  Goal: Patient will remain free of falls  Description: INTERVENTIONS:  - Educate patient/family on patient safety including physical limitations  - Instruct patient to call for assistance with activity   - Consult OT/PT to assist with strengthening/mobility   - Keep Call bell within reach  - Keep bed low and locked with side rails adjusted as appropriate  - Keep care items and personal belongings within reach  - Initiate and maintain comfort rounds  - Make Fall Risk Sign visible to staff  - Offer Toileting every  Hours, in advance of need  - Initiate/Maintain alarm  - Obtain necessary fall risk management equipment:   - Apply yellow socks and bracelet for high fall risk patients  - Consider moving patient to room near nurses station  Outcome: Progressing     Problem: Prexisting or High Potential for Compromised Skin Integrity  Goal: Skin integrity is maintained or improved  Description: INTERVENTIONS:  - Identify patients at risk for skin breakdown  - Assess and monitor skin integrity  - Assess and monitor nutrition and hydration status  - Monitor labs   - Assess for incontinence   - Turn and reposition patient  - Assist with mobility/ambulation  - Relieve pressure over bony prominences  - Avoid friction and shearing  - Provide appropriate hygiene as needed including keeping skin clean and dry  - Evaluate need for skin moisturizer/barrier cream  - Collaborate with interdisciplinary team   - Patient/family teaching  - Consider wound care consult   Outcome: Progressing     Problem: MOBILITY - ADULT  Goal: Maintain or return to baseline ADL function  Description: INTERVENTIONS:  -  Assess patient's ability to carry out ADLs; assess patient's baseline for ADL function and identify physical deficits which impact ability to perform ADLs (bathing, care of mouth/teeth, toileting, grooming, dressing, etc )  - Assess/evaluate cause of self-care deficits   - Assess range of motion  - Assess patient's mobility; develop plan if impaired  - Assess patient's need for assistive devices and provide as appropriate  - Encourage maximum independence but intervene and supervise when necessary  - Involve family in performance of ADLs  - Assess for home care needs following discharge   - Consider OT consult to assist with ADL evaluation and planning for discharge  - Provide patient education as appropriate  Outcome: Progressing  Goal: Maintains/Returns to pre admission functional level  Description: INTERVENTIONS:  - Perform BMAT or MOVE assessment daily    - Set and communicate daily mobility goal to care team and patient/family/caregiver  - Collaborate with rehabilitation services on mobility goals if consulted  - Perform Range of Motion  times a day  - Reposition patient every  hours  - Dangle patient  times a day  - Stand patient  times a day  - Ambulate patient  times a day  - Out of bed to chair  times a day   - Out of bed for meals  times a day  - Out of bed for toileting  - Record patient progress and toleration of activity level   Outcome: Progressing     Problem: HEMATOLOGIC - ADULT  Goal: Maintains hematologic stability  Description: INTERVENTIONS  - Assess for signs and symptoms of bleeding or hemorrhage  - Monitor labs  - Administer supportive blood products/factors as ordered and appropriate  Outcome: Progressing     Problem: Nutrition/Hydration-ADULT  Goal: Nutrient/Hydration intake appropriate for improving, restoring or maintaining nutritional needs  Description: Monitor and assess patient's nutrition/hydration status for malnutrition  Collaborate with interdisciplinary team and initiate plan and interventions as ordered  Monitor patient's weight and dietary intake as ordered or per policy  Utilize nutrition screening tool and intervene as necessary  Determine patient's food preferences and provide high-protein, high-caloric foods as appropriate       INTERVENTIONS:  - Monitor oral intake, urinary output, labs, and treatment plans  - Assess nutrition and hydration status and recommend course of action  - Evaluate amount of meals eaten  - Assist patient with eating if necessary   - Allow adequate time for meals  - Recommend/ encourage appropriate diets, oral nutritional supplements, and vitamin/mineral supplements  - Order, calculate, and assess calorie counts as needed  - Recommend, monitor, and adjust tube feedings and TPN/PPN based on assessed needs  - Assess need for intravenous fluids  - Provide specific nutrition/hydration education as appropriate  - Include patient/family/caregiver in decisions related to nutrition  Outcome: Progressing

## (undated) DEVICE — INTENDED FOR TISSUE SEPARATION, AND OTHER PROCEDURES THAT REQUIRE A SHARP SURGICAL BLADE TO PUNCTURE OR CUT.: Brand: BARD-PARKER SAFETY BLADES SIZE 15, STERILE

## (undated) DEVICE — PADDING,UNDERCAST,COTTON, 4"X4YD STERILE: Brand: MEDLINE

## (undated) DEVICE — GAUZE SPONGES,USP TYPE VII GAUZE, 12 PLY: Brand: CURITY

## (undated) DEVICE — DRAPE SHEET THREE QUARTER

## (undated) DEVICE — ACE WRAP 4 IN STERILE

## (undated) DEVICE — PADDING CAST 4 IN  COTTON STRL

## (undated) DEVICE — LIGHT HANDLE COVER SLEEVE DISP BLUE STELLAR

## (undated) DEVICE — SYRINGE 10ML LL

## (undated) DEVICE — TONGUE DEPRESSOR STERILE

## (undated) DEVICE — ACE WRAP 4 IN UNSTERILE

## (undated) DEVICE — DRESSING ADAPTIC STRIPS

## (undated) DEVICE — FIBER PROC KIT GOLD TIP 21G 45CM NEVERTOUCH

## (undated) DEVICE — STOPCOCK 4-WAY

## (undated) DEVICE — COVER PROBE INTRAOPERATIVE 6 X 96 IN

## (undated) DEVICE — ACE WRAP 6 IN XL STERILE

## (undated) DEVICE — SPONGE STICK WITH PVP-I: Brand: KENDALL

## (undated) DEVICE — 3M™ TEGADERM™ TRANSPARENT FILM DRESSING FRAME STYLE, 1626W, 4 IN X 4-3/4 IN (10 CM X 12 CM), 50/CT 4CT/CASE: Brand: 3M™ TEGADERM™

## (undated) DEVICE — ADHESIVE SKN CLSR HISTOACRYL FLEX 0.5ML LF

## (undated) DEVICE — REM POLYHESIVE ADULT PATIENT RETURN ELECTRODE: Brand: VALLEYLAB

## (undated) DEVICE — GEL ULTRASOUND 1L HIGH VISCOUS

## (undated) DEVICE — TIBURON SPLIT SHEET: Brand: CONVERTORS

## (undated) DEVICE — INSULATED BLADE ELECTRODE: Brand: EDGE

## (undated) DEVICE — UTILITY MARKER,BLACK WITH LABELS: Brand: DEVON

## (undated) DEVICE — BETHLEHEM UNIVERSAL MINOR GEN: Brand: CARDINAL HEALTH

## (undated) DEVICE — DRAPE SHEET X-LG

## (undated) DEVICE — ACE WRAP 6 IN UNSTERILE

## (undated) DEVICE — GLOVE SRG BIOGEL 7.5

## (undated) DEVICE — INTENDED FOR TISSUE SEPARATION, AND OTHER PROCEDURES THAT REQUIRE A SHARP SURGICAL BLADE TO PUNCTURE OR CUT.: Brand: BARD-PARKER ® CARBON RIB-BACK BLADES

## (undated) DEVICE — VIAL DECANTER